# Patient Record
Sex: FEMALE | Race: WHITE | NOT HISPANIC OR LATINO | Employment: OTHER | URBAN - METROPOLITAN AREA
[De-identification: names, ages, dates, MRNs, and addresses within clinical notes are randomized per-mention and may not be internally consistent; named-entity substitution may affect disease eponyms.]

---

## 2017-01-05 ENCOUNTER — COMMUNICATION - HEALTHEAST (OUTPATIENT)
Dept: FAMILY MEDICINE | Facility: CLINIC | Age: 72
End: 2017-01-05

## 2017-01-05 ENCOUNTER — OFFICE VISIT - HEALTHEAST (OUTPATIENT)
Dept: FAMILY MEDICINE | Facility: CLINIC | Age: 72
End: 2017-01-05

## 2017-01-05 ENCOUNTER — RECORDS - HEALTHEAST (OUTPATIENT)
Dept: GENERAL RADIOLOGY | Facility: CLINIC | Age: 72
End: 2017-01-05

## 2017-01-05 DIAGNOSIS — R06.02 SHORTNESS OF BREATH: ICD-10-CM

## 2017-01-05 DIAGNOSIS — R05.9 COUGH: ICD-10-CM

## 2017-01-05 DIAGNOSIS — J02.0 STREP PHARYNGITIS: ICD-10-CM

## 2017-01-05 DIAGNOSIS — R53.1 WEAKNESS: ICD-10-CM

## 2017-01-05 DIAGNOSIS — E87.6 HYPOKALEMIA: ICD-10-CM

## 2017-01-05 DIAGNOSIS — K13.79 MOUTH SORES: ICD-10-CM

## 2017-01-10 ENCOUNTER — OFFICE VISIT - HEALTHEAST (OUTPATIENT)
Dept: FAMILY MEDICINE | Facility: CLINIC | Age: 72
End: 2017-01-10

## 2017-01-10 DIAGNOSIS — R05.9 COUGH: ICD-10-CM

## 2017-01-10 DIAGNOSIS — D64.9 ANEMIA: ICD-10-CM

## 2017-01-10 DIAGNOSIS — E87.6 HYPOKALEMIA: ICD-10-CM

## 2017-01-10 DIAGNOSIS — J18.9 POSTOBSTRUCTIVE PNEUMONIA: ICD-10-CM

## 2017-01-10 DIAGNOSIS — K12.0 ORAL APHTHOUS ULCER: ICD-10-CM

## 2017-01-10 DIAGNOSIS — R21 RASH: ICD-10-CM

## 2017-01-11 LAB
ERYTHROCYTE [DISTWIDTH] IN BLOOD BY AUTOMATED COUNT: 16.7 % (ref 11–14.5)
HCT VFR BLD AUTO: 35 % (ref 35–47)
HGB BLD-MCNC: 10 G/DL (ref 12–16)
LAB AP CHARGES (HE HISTORICAL CONVERSION): NORMAL
MCH RBC QN AUTO: 23.5 PG (ref 27–34)
MCHC RBC AUTO-ENTMCNC: 28.6 G/DL (ref 32–36)
MCV RBC AUTO: 82 FL (ref 80–100)
PATH REPORT.COMMENTS IMP SPEC: NORMAL
PATH REPORT.COMMENTS IMP SPEC: NORMAL
PATH REPORT.FINAL DX SPEC: NORMAL
PATH REPORT.MICROSCOPIC SPEC OTHER STN: ABNORMAL
PATH REPORT.MICROSCOPIC SPEC OTHER STN: NORMAL
PATH REPORT.RELEVANT HX SPEC: NORMAL
PLATELET # BLD AUTO: 684 THOU/UL (ref 140–440)
PMV BLD AUTO: 9.5 FL (ref 8.5–12.5)
RBC # BLD AUTO: 4.25 MILL/UL (ref 3.8–5.4)
WBC: 9.2 THOU/UL (ref 4–11)

## 2017-01-18 ENCOUNTER — COMMUNICATION - HEALTHEAST (OUTPATIENT)
Dept: PULMONOLOGY | Facility: OTHER | Age: 72
End: 2017-01-18

## 2017-01-24 ENCOUNTER — COMMUNICATION - HEALTHEAST (OUTPATIENT)
Dept: NURSING | Facility: CLINIC | Age: 72
End: 2017-01-24

## 2017-01-24 ENCOUNTER — COMMUNICATION - HEALTHEAST (OUTPATIENT)
Dept: FAMILY MEDICINE | Facility: CLINIC | Age: 72
End: 2017-01-24

## 2017-01-24 DIAGNOSIS — G50.9 TRIGEMINAL NERVE DISORDER: ICD-10-CM

## 2017-01-25 ENCOUNTER — HOSPITAL ENCOUNTER (OUTPATIENT)
Dept: CT IMAGING | Facility: HOSPITAL | Age: 72
Discharge: HOME | End: 2017-01-25
Attending: FAMILY MEDICINE

## 2017-01-25 ENCOUNTER — COMMUNICATION - HEALTHEAST (OUTPATIENT)
Dept: FAMILY MEDICINE | Facility: CLINIC | Age: 72
End: 2017-01-25

## 2017-01-25 DIAGNOSIS — F41.9 ANXIETY: ICD-10-CM

## 2017-01-25 DIAGNOSIS — R05.9 COUGH: ICD-10-CM

## 2017-02-01 ENCOUNTER — OFFICE VISIT - HEALTHEAST (OUTPATIENT)
Dept: PULMONOLOGY | Facility: OTHER | Age: 72
End: 2017-02-01

## 2017-02-01 DIAGNOSIS — J18.9 PNEUMONIA: ICD-10-CM

## 2017-02-22 ENCOUNTER — COMMUNICATION - HEALTHEAST (OUTPATIENT)
Dept: FAMILY MEDICINE | Facility: CLINIC | Age: 72
End: 2017-02-22

## 2017-02-22 DIAGNOSIS — G50.9 TRIGEMINAL NERVE DISORDER: ICD-10-CM

## 2017-03-01 ENCOUNTER — COMMUNICATION - HEALTHEAST (OUTPATIENT)
Dept: SCHEDULING | Facility: CLINIC | Age: 72
End: 2017-03-01

## 2017-03-02 ENCOUNTER — OFFICE VISIT - HEALTHEAST (OUTPATIENT)
Dept: FAMILY MEDICINE | Facility: CLINIC | Age: 72
End: 2017-03-02

## 2017-03-02 DIAGNOSIS — G50.0 TRIGEMINAL NEURALGIA: ICD-10-CM

## 2017-03-02 DIAGNOSIS — R51.9 FACIAL PAIN: ICD-10-CM

## 2017-03-06 ENCOUNTER — COMMUNICATION - HEALTHEAST (OUTPATIENT)
Dept: FAMILY MEDICINE | Facility: CLINIC | Age: 72
End: 2017-03-06

## 2017-03-06 ENCOUNTER — OFFICE VISIT - HEALTHEAST (OUTPATIENT)
Dept: FAMILY MEDICINE | Facility: CLINIC | Age: 72
End: 2017-03-06

## 2017-03-06 DIAGNOSIS — G50.0 TRIGEMINAL NEURALGIA: ICD-10-CM

## 2017-03-06 DIAGNOSIS — G43.909 MIGRAINE: ICD-10-CM

## 2017-03-06 DIAGNOSIS — R51.9 FACIAL PAIN: ICD-10-CM

## 2017-03-08 ENCOUNTER — COMMUNICATION - HEALTHEAST (OUTPATIENT)
Dept: FAMILY MEDICINE | Facility: CLINIC | Age: 72
End: 2017-03-08

## 2017-03-08 DIAGNOSIS — F41.9 ANXIETY: ICD-10-CM

## 2017-03-09 ENCOUNTER — AMBULATORY - HEALTHEAST (OUTPATIENT)
Dept: FAMILY MEDICINE | Facility: CLINIC | Age: 72
End: 2017-03-09

## 2017-03-09 DIAGNOSIS — G50.0 TRIGEMINAL NEURALGIA: ICD-10-CM

## 2017-03-09 DIAGNOSIS — R51.9 FACIAL PAIN: ICD-10-CM

## 2017-03-13 ENCOUNTER — AMBULATORY - HEALTHEAST (OUTPATIENT)
Dept: CARE COORDINATION | Facility: CLINIC | Age: 72
End: 2017-03-13

## 2017-03-14 ENCOUNTER — AMBULATORY - HEALTHEAST (OUTPATIENT)
Dept: CARE COORDINATION | Facility: CLINIC | Age: 72
End: 2017-03-14

## 2017-03-15 ENCOUNTER — AMBULATORY - HEALTHEAST (OUTPATIENT)
Dept: CARE COORDINATION | Facility: CLINIC | Age: 72
End: 2017-03-15

## 2017-03-16 ENCOUNTER — COMMUNICATION - HEALTHEAST (OUTPATIENT)
Dept: NURSING | Facility: CLINIC | Age: 72
End: 2017-03-16

## 2017-03-21 ENCOUNTER — COMMUNICATION - HEALTHEAST (OUTPATIENT)
Dept: FAMILY MEDICINE | Facility: CLINIC | Age: 72
End: 2017-03-21

## 2017-03-21 DIAGNOSIS — R51.9 FACIAL PAIN: ICD-10-CM

## 2017-03-28 ENCOUNTER — RECORDS - HEALTHEAST (OUTPATIENT)
Dept: ADMINISTRATIVE | Facility: OTHER | Age: 72
End: 2017-03-28

## 2017-03-31 ENCOUNTER — COMMUNICATION - HEALTHEAST (OUTPATIENT)
Dept: FAMILY MEDICINE | Facility: CLINIC | Age: 72
End: 2017-03-31

## 2017-03-31 DIAGNOSIS — G50.9 TRIGEMINAL NERVE DISORDER: ICD-10-CM

## 2017-03-31 DIAGNOSIS — F41.9 ANXIETY: ICD-10-CM

## 2017-04-18 ENCOUNTER — COMMUNICATION - HEALTHEAST (OUTPATIENT)
Dept: FAMILY MEDICINE | Facility: CLINIC | Age: 72
End: 2017-04-18

## 2017-04-18 DIAGNOSIS — F41.9 ANXIETY: ICD-10-CM

## 2017-04-18 DIAGNOSIS — G50.9 TRIGEMINAL NERVE DISORDER: ICD-10-CM

## 2017-05-04 ENCOUNTER — COMMUNICATION - HEALTHEAST (OUTPATIENT)
Dept: NURSING | Facility: CLINIC | Age: 72
End: 2017-05-04

## 2017-05-09 ENCOUNTER — COMMUNICATION - HEALTHEAST (OUTPATIENT)
Dept: FAMILY MEDICINE | Facility: CLINIC | Age: 72
End: 2017-05-09

## 2017-05-09 DIAGNOSIS — G43.909 MIGRAINE: ICD-10-CM

## 2017-05-10 ENCOUNTER — COMMUNICATION - HEALTHEAST (OUTPATIENT)
Dept: FAMILY MEDICINE | Facility: CLINIC | Age: 72
End: 2017-05-10

## 2017-05-10 DIAGNOSIS — G51.8 FACIAL NEURALGIA: ICD-10-CM

## 2017-05-15 ENCOUNTER — COMMUNICATION - HEALTHEAST (OUTPATIENT)
Dept: FAMILY MEDICINE | Facility: CLINIC | Age: 72
End: 2017-05-15

## 2017-05-15 DIAGNOSIS — G50.9 TRIGEMINAL NERVE DISORDER: ICD-10-CM

## 2017-05-23 ENCOUNTER — COMMUNICATION - HEALTHEAST (OUTPATIENT)
Dept: FAMILY MEDICINE | Facility: CLINIC | Age: 72
End: 2017-05-23

## 2017-05-23 DIAGNOSIS — G50.9 TRIGEMINAL NERVE DISORDER: ICD-10-CM

## 2017-05-25 ENCOUNTER — COMMUNICATION - HEALTHEAST (OUTPATIENT)
Dept: FAMILY MEDICINE | Facility: CLINIC | Age: 72
End: 2017-05-25

## 2017-05-25 DIAGNOSIS — F41.9 ANXIETY: ICD-10-CM

## 2017-06-01 ENCOUNTER — COMMUNICATION - HEALTHEAST (OUTPATIENT)
Dept: NURSING | Facility: CLINIC | Age: 72
End: 2017-06-01

## 2017-06-13 ENCOUNTER — COMMUNICATION - HEALTHEAST (OUTPATIENT)
Dept: NURSING | Facility: CLINIC | Age: 72
End: 2017-06-13

## 2017-06-20 ENCOUNTER — COMMUNICATION - HEALTHEAST (OUTPATIENT)
Dept: FAMILY MEDICINE | Facility: CLINIC | Age: 72
End: 2017-06-20

## 2017-06-20 DIAGNOSIS — G50.9 TRIGEMINAL NERVE DISORDER: ICD-10-CM

## 2017-06-23 ENCOUNTER — COMMUNICATION - HEALTHEAST (OUTPATIENT)
Dept: NURSING | Facility: CLINIC | Age: 72
End: 2017-06-23

## 2017-06-28 ENCOUNTER — COMMUNICATION - HEALTHEAST (OUTPATIENT)
Dept: FAMILY MEDICINE | Facility: CLINIC | Age: 72
End: 2017-06-28

## 2017-06-28 DIAGNOSIS — F41.9 ANXIETY: ICD-10-CM

## 2017-07-03 ENCOUNTER — COMMUNICATION - HEALTHEAST (OUTPATIENT)
Dept: FAMILY MEDICINE | Facility: CLINIC | Age: 72
End: 2017-07-03

## 2017-07-10 ENCOUNTER — AMBULATORY - HEALTHEAST (OUTPATIENT)
Dept: FAMILY MEDICINE | Facility: CLINIC | Age: 72
End: 2017-07-10

## 2017-07-10 ENCOUNTER — OFFICE VISIT - HEALTHEAST (OUTPATIENT)
Dept: FAMILY MEDICINE | Facility: CLINIC | Age: 72
End: 2017-07-10

## 2017-07-10 DIAGNOSIS — R60.9 EDEMA: ICD-10-CM

## 2017-07-10 DIAGNOSIS — B35.1 ONYCHOMYCOSIS: ICD-10-CM

## 2017-07-10 DIAGNOSIS — F41.9 ANXIETY: ICD-10-CM

## 2017-07-10 DIAGNOSIS — G50.0 TRIGEMINAL NEURALGIA: ICD-10-CM

## 2017-07-10 DIAGNOSIS — G50.9 TRIGEMINAL NERVE DISORDER: ICD-10-CM

## 2017-07-11 ENCOUNTER — COMMUNICATION - HEALTHEAST (OUTPATIENT)
Dept: FAMILY MEDICINE | Facility: CLINIC | Age: 72
End: 2017-07-11

## 2017-08-02 ENCOUNTER — COMMUNICATION - HEALTHEAST (OUTPATIENT)
Dept: FAMILY MEDICINE | Facility: CLINIC | Age: 72
End: 2017-08-02

## 2017-08-02 DIAGNOSIS — G43.909 MIGRAINE: ICD-10-CM

## 2017-08-10 ENCOUNTER — COMMUNICATION - HEALTHEAST (OUTPATIENT)
Dept: NURSING | Facility: CLINIC | Age: 72
End: 2017-08-10

## 2017-08-14 ENCOUNTER — COMMUNICATION - HEALTHEAST (OUTPATIENT)
Dept: FAMILY MEDICINE | Facility: CLINIC | Age: 72
End: 2017-08-14

## 2017-08-14 DIAGNOSIS — G50.9 TRIGEMINAL NERVE DISORDER: ICD-10-CM

## 2017-09-11 ENCOUNTER — COMMUNICATION - HEALTHEAST (OUTPATIENT)
Dept: FAMILY MEDICINE | Facility: CLINIC | Age: 72
End: 2017-09-11

## 2017-09-11 DIAGNOSIS — G50.9 TRIGEMINAL NERVE DISORDER: ICD-10-CM

## 2017-09-26 ENCOUNTER — COMMUNICATION - HEALTHEAST (OUTPATIENT)
Dept: NURSING | Facility: CLINIC | Age: 72
End: 2017-09-26

## 2017-10-03 ENCOUNTER — COMMUNICATION - HEALTHEAST (OUTPATIENT)
Dept: FAMILY MEDICINE | Facility: CLINIC | Age: 72
End: 2017-10-03

## 2017-10-03 DIAGNOSIS — G50.0 TRIGEMINAL NEURALGIA: ICD-10-CM

## 2017-10-09 ENCOUNTER — COMMUNICATION - HEALTHEAST (OUTPATIENT)
Dept: FAMILY MEDICINE | Facility: CLINIC | Age: 72
End: 2017-10-09

## 2017-10-09 DIAGNOSIS — G50.9 TRIGEMINAL NERVE DISORDER: ICD-10-CM

## 2017-11-03 ENCOUNTER — COMMUNICATION - HEALTHEAST (OUTPATIENT)
Dept: FAMILY MEDICINE | Facility: CLINIC | Age: 72
End: 2017-11-03

## 2017-11-03 DIAGNOSIS — G50.9 TRIGEMINAL NERVE DISORDER: ICD-10-CM

## 2017-12-01 ENCOUNTER — COMMUNICATION - HEALTHEAST (OUTPATIENT)
Dept: FAMILY MEDICINE | Facility: CLINIC | Age: 72
End: 2017-12-01

## 2017-12-01 DIAGNOSIS — G50.9 TRIGEMINAL NERVE DISORDER: ICD-10-CM

## 2017-12-26 ENCOUNTER — COMMUNICATION - HEALTHEAST (OUTPATIENT)
Dept: NURSING | Facility: CLINIC | Age: 72
End: 2017-12-26

## 2017-12-29 ENCOUNTER — COMMUNICATION - HEALTHEAST (OUTPATIENT)
Dept: NURSING | Facility: CLINIC | Age: 72
End: 2017-12-29

## 2018-01-02 ENCOUNTER — COMMUNICATION - HEALTHEAST (OUTPATIENT)
Dept: FAMILY MEDICINE | Facility: CLINIC | Age: 73
End: 2018-01-02

## 2018-01-02 DIAGNOSIS — G50.9 TRIGEMINAL NERVE DISORDER: ICD-10-CM

## 2018-01-03 ENCOUNTER — COMMUNICATION - HEALTHEAST (OUTPATIENT)
Dept: NURSING | Facility: CLINIC | Age: 73
End: 2018-01-03

## 2018-01-30 ENCOUNTER — COMMUNICATION - HEALTHEAST (OUTPATIENT)
Dept: FAMILY MEDICINE | Facility: CLINIC | Age: 73
End: 2018-01-30

## 2018-01-30 DIAGNOSIS — G50.9 TRIGEMINAL NERVE DISORDER: ICD-10-CM

## 2018-02-09 ENCOUNTER — COMMUNICATION - HEALTHEAST (OUTPATIENT)
Dept: NURSING | Facility: CLINIC | Age: 73
End: 2018-02-09

## 2018-02-15 ENCOUNTER — COMMUNICATION - HEALTHEAST (OUTPATIENT)
Dept: NURSING | Facility: CLINIC | Age: 73
End: 2018-02-15

## 2018-02-23 ENCOUNTER — COMMUNICATION - HEALTHEAST (OUTPATIENT)
Dept: NURSING | Facility: CLINIC | Age: 73
End: 2018-02-23

## 2018-02-26 ENCOUNTER — COMMUNICATION - HEALTHEAST (OUTPATIENT)
Dept: FAMILY MEDICINE | Facility: CLINIC | Age: 73
End: 2018-02-26

## 2018-02-26 DIAGNOSIS — G50.9 TRIGEMINAL NERVE DISORDER: ICD-10-CM

## 2018-03-02 ENCOUNTER — AMBULATORY - HEALTHEAST (OUTPATIENT)
Dept: NURSING | Facility: CLINIC | Age: 73
End: 2018-03-02

## 2018-03-19 ENCOUNTER — COMMUNICATION - HEALTHEAST (OUTPATIENT)
Dept: FAMILY MEDICINE | Facility: CLINIC | Age: 73
End: 2018-03-19

## 2018-03-19 DIAGNOSIS — G51.8 FACIAL NEURALGIA: ICD-10-CM

## 2018-03-26 ENCOUNTER — COMMUNICATION - HEALTHEAST (OUTPATIENT)
Dept: FAMILY MEDICINE | Facility: CLINIC | Age: 73
End: 2018-03-26

## 2018-03-26 DIAGNOSIS — G50.9 TRIGEMINAL NERVE DISORDER: ICD-10-CM

## 2018-04-23 ENCOUNTER — COMMUNICATION - HEALTHEAST (OUTPATIENT)
Dept: FAMILY MEDICINE | Facility: CLINIC | Age: 73
End: 2018-04-23

## 2018-04-23 DIAGNOSIS — G50.9 TRIGEMINAL NERVE DISORDER: ICD-10-CM

## 2018-04-25 ENCOUNTER — COMMUNICATION - HEALTHEAST (OUTPATIENT)
Dept: NURSING | Facility: CLINIC | Age: 73
End: 2018-04-25

## 2018-04-30 ENCOUNTER — COMMUNICATION - HEALTHEAST (OUTPATIENT)
Dept: NURSING | Facility: CLINIC | Age: 73
End: 2018-04-30

## 2018-05-08 ENCOUNTER — COMMUNICATION - HEALTHEAST (OUTPATIENT)
Dept: NURSING | Facility: CLINIC | Age: 73
End: 2018-05-08

## 2018-05-10 ENCOUNTER — COMMUNICATION - HEALTHEAST (OUTPATIENT)
Dept: NURSING | Facility: CLINIC | Age: 73
End: 2018-05-10

## 2018-05-10 ENCOUNTER — OFFICE VISIT - HEALTHEAST (OUTPATIENT)
Dept: FAMILY MEDICINE | Facility: CLINIC | Age: 73
End: 2018-05-10

## 2018-05-10 DIAGNOSIS — H61.23 HEARING LOSS DUE TO CERUMEN IMPACTION, BILATERAL: ICD-10-CM

## 2018-05-10 DIAGNOSIS — R60.9 EDEMA: ICD-10-CM

## 2018-05-10 DIAGNOSIS — Z12.11 SCREEN FOR COLON CANCER: ICD-10-CM

## 2018-05-10 DIAGNOSIS — Z12.31 VISIT FOR SCREENING MAMMOGRAM: ICD-10-CM

## 2018-05-10 DIAGNOSIS — Z00.00 ROUTINE GENERAL MEDICAL EXAMINATION AT A HEALTH CARE FACILITY: ICD-10-CM

## 2018-05-10 DIAGNOSIS — G50.0 TRIGEMINAL NEURALGIA: ICD-10-CM

## 2018-05-10 DIAGNOSIS — Z71.89 COUNSELING AND COORDINATION OF CARE: ICD-10-CM

## 2018-05-10 LAB
AMPHETAMINES UR QL SCN: ABNORMAL
ANION GAP SERPL CALCULATED.3IONS-SCNC: 7 MMOL/L (ref 5–18)
BARBITURATES UR QL: ABNORMAL
BENZODIAZ UR QL: ABNORMAL
BUN SERPL-MCNC: 12 MG/DL (ref 8–28)
CALCIUM SERPL-MCNC: 8.9 MG/DL (ref 8.5–10.5)
CANNABINOIDS UR QL SCN: ABNORMAL
CHLORIDE BLD-SCNC: 109 MMOL/L (ref 98–107)
CHOLEST SERPL-MCNC: 221 MG/DL
CO2 SERPL-SCNC: 27 MMOL/L (ref 22–31)
COCAINE UR QL: ABNORMAL
CREAT SERPL-MCNC: 0.72 MG/DL (ref 0.6–1.1)
CREAT UR-MCNC: 41.6 MG/DL
ERYTHROCYTE [DISTWIDTH] IN BLOOD BY AUTOMATED COUNT: 15.1 % (ref 11–14.5)
FASTING STATUS PATIENT QL REPORTED: YES
GFR SERPL CREATININE-BSD FRML MDRD: >60 ML/MIN/1.73M2
GLUCOSE BLD-MCNC: 91 MG/DL (ref 70–125)
HCT VFR BLD AUTO: 30.9 % (ref 35–47)
HDLC SERPL-MCNC: 75 MG/DL
HGB BLD-MCNC: 9.3 G/DL (ref 12–16)
LDLC SERPL CALC-MCNC: 125 MG/DL
MCH RBC QN AUTO: 21.6 PG (ref 27–34)
MCHC RBC AUTO-ENTMCNC: 30.2 G/DL (ref 32–36)
MCV RBC AUTO: 72 FL (ref 80–100)
METHADONE UR QL SCN: ABNORMAL
OPIATES UR QL SCN: ABNORMAL
OXYCODONE UR QL: ABNORMAL
PCP UR QL SCN: ABNORMAL
PLATELET # BLD AUTO: 434 THOU/UL (ref 140–440)
PMV BLD AUTO: 7.4 FL (ref 7–10)
POTASSIUM BLD-SCNC: 4 MMOL/L (ref 3.5–5)
RBC # BLD AUTO: 4.31 MILL/UL (ref 3.8–5.4)
SODIUM SERPL-SCNC: 143 MMOL/L (ref 136–145)
TRIGL SERPL-MCNC: 104 MG/DL
WBC: 7.8 THOU/UL (ref 4–11)

## 2018-05-10 ASSESSMENT — MIFFLIN-ST. JEOR: SCORE: 989.6

## 2018-05-11 ENCOUNTER — COMMUNICATION - HEALTHEAST (OUTPATIENT)
Dept: FAMILY MEDICINE | Facility: CLINIC | Age: 73
End: 2018-05-11

## 2018-05-22 ENCOUNTER — COMMUNICATION - HEALTHEAST (OUTPATIENT)
Dept: FAMILY MEDICINE | Facility: CLINIC | Age: 73
End: 2018-05-22

## 2018-05-22 DIAGNOSIS — G50.9 TRIGEMINAL NERVE DISORDER: ICD-10-CM

## 2018-06-11 ENCOUNTER — HOSPITAL ENCOUNTER (OUTPATIENT)
Dept: CARDIOLOGY | Facility: HOSPITAL | Age: 73
Discharge: HOME | End: 2018-06-11
Attending: FAMILY MEDICINE

## 2018-06-11 DIAGNOSIS — R60.9 EDEMA: ICD-10-CM

## 2018-06-11 LAB
AORTIC ROOT: 3 CM
AR DECEL SLOPE: 1990 MM/S2
AR PEAK VELOCITY: 428 CM/S
AV REGURGITANT PEAK GRADIENT: 73.3 MMHG
AV REGURGITATION PRESSURE HALF TIME: 598 MS
BSA FOR ECHO PROCEDURE: 1.5 M2
CV BLOOD PRESSURE: NORMAL MMHG
CV ECHO HEIGHT: 65.5 IN
CV ECHO WEIGHT: 108 LBS
DOP CALC LVOT AREA: 2.54 CM2
DOP CALC LVOT DIAMETER: 1.8 CM
DOP CALC LVOT PEAK VEL: 124 CM/S
DOP CALC LVOT STROKE VOLUME: 72.7 CM3
DOP CALCLVOT PEAK VEL VTI: 28.6 CM
ECHO EJECTION FRACTION ESTIMATED: 70 %
EJECTION FRACTION: 80 % (ref 55–75)
FRACTIONAL SHORTENING: 37.9 % (ref 28–44)
INTERVENTRICULAR SEPTUM IN END DIASTOLE: 0.87 CM (ref 0.6–0.9)
IVS/PW RATIO: 1
LEFT ATRIUM SIZE: 2.7 CM
LEFT ATRIUM TO AORTIC ROOT RATIO: 0.9 NO UNITS
LEFT VENTRICLE CARDIAC INDEX: 2.9 L/MIN/M2
LEFT VENTRICLE CARDIAC OUTPUT: 4.4 L/MIN
LEFT VENTRICLE DIASTOLIC VOLUME INDEX: 40.6 CM3/M2 (ref 34–74)
LEFT VENTRICLE DIASTOLIC VOLUME: 60.9 CM3 (ref 46–106)
LEFT VENTRICLE HEART RATE: 60 BPM
LEFT VENTRICLE MASS INDEX: 65.9 G/M2
LEFT VENTRICLE SYSTOLIC VOLUME INDEX: 7.9 CM3/M2 (ref 11–31)
LEFT VENTRICLE SYSTOLIC VOLUME: 11.9 CM3 (ref 14–42)
LEFT VENTRICULAR INTERNAL DIMENSION IN DIASTOLE: 3.83 CM (ref 3.8–5.2)
LEFT VENTRICULAR INTERNAL DIMENSION IN SYSTOLE: 2.38 CM (ref 2.2–3.5)
LEFT VENTRICULAR MASS: 98.8 G
LEFT VENTRICULAR OUTFLOW TRACT MEAN GRADIENT: 3 MMHG
LEFT VENTRICULAR OUTFLOW TRACT MEAN VELOCITY: 77.7 CM/S
LEFT VENTRICULAR OUTFLOW TRACT PEAK GRADIENT: 6 MMHG
LEFT VENTRICULAR POSTERIOR WALL IN END DIASTOLE: 0.88 CM (ref 0.6–0.9)
LV STROKE VOLUME INDEX: 48.5 ML/M2
MITRAL VALVE E/A RATIO: 0.9
MV AVERAGE E/E' RATIO: 15.1 CM/S
MV DECELERATION TIME: 197 MS
MV E'TISSUE VEL-LAT: 7.54 CM/S
MV E'TISSUE VEL-MED: 8.99 CM/S
MV LATERAL E/E' RATIO: 16.6
MV MEDIAL E/E' RATIO: 13.9
MV PEAK A VELOCITY: 144 CM/S
MV PEAK E VELOCITY: 125 CM/S
NUC REST DIASTOLIC VOLUME INDEX: 1728 LBS
NUC REST SYSTOLIC VOLUME INDEX: 65.5 IN
TRICUSPID REGURGITATION PEAK PRESSURE GRADIENT: 33.6 MMHG
TRICUSPID VALVE ANULAR PLANE SYSTOLIC EXCURSION: 2.3 CM
TRICUSPID VALVE PEAK REGURGITANT VELOCITY: 290 CM/S

## 2018-06-11 ASSESSMENT — MIFFLIN-ST. JEOR: SCORE: 988.69

## 2018-06-12 ENCOUNTER — HOSPITAL ENCOUNTER (OUTPATIENT)
Dept: MAMMOGRAPHY | Facility: CLINIC | Age: 73
Discharge: HOME | End: 2018-06-12
Attending: FAMILY MEDICINE

## 2018-06-12 ENCOUNTER — COMMUNICATION - HEALTHEAST (OUTPATIENT)
Dept: NURSING | Facility: CLINIC | Age: 73
End: 2018-06-12

## 2018-06-12 DIAGNOSIS — Z12.31 VISIT FOR SCREENING MAMMOGRAM: ICD-10-CM

## 2018-06-18 ENCOUNTER — COMMUNICATION - HEALTHEAST (OUTPATIENT)
Dept: FAMILY MEDICINE | Facility: CLINIC | Age: 73
End: 2018-06-18

## 2018-06-18 DIAGNOSIS — G50.9 TRIGEMINAL NERVE DISORDER: ICD-10-CM

## 2018-07-09 ENCOUNTER — OFFICE VISIT - HEALTHEAST (OUTPATIENT)
Dept: FAMILY MEDICINE | Facility: CLINIC | Age: 73
End: 2018-07-09

## 2018-07-09 DIAGNOSIS — M94.9 DISORDER OF BONE AND CARTILAGE: ICD-10-CM

## 2018-07-09 DIAGNOSIS — Z79.899 CONTROLLED SUBSTANCE AGREEMENT SIGNED: ICD-10-CM

## 2018-07-09 DIAGNOSIS — G50.9 TRIGEMINAL NERVE DISORDER: ICD-10-CM

## 2018-07-09 DIAGNOSIS — Z76.89 ENCOUNTER TO ESTABLISH CARE: ICD-10-CM

## 2018-07-09 DIAGNOSIS — G43.909 MIGRAINE: ICD-10-CM

## 2018-07-09 DIAGNOSIS — R63.6 UNDERWEIGHT: ICD-10-CM

## 2018-07-09 DIAGNOSIS — Z12.11 SCREEN FOR COLON CANCER: ICD-10-CM

## 2018-07-09 DIAGNOSIS — G50.0 TRIGEMINAL NEURALGIA: ICD-10-CM

## 2018-07-09 DIAGNOSIS — M89.9 DISORDER OF BONE AND CARTILAGE: ICD-10-CM

## 2018-07-09 DIAGNOSIS — G51.8 FACIAL NEURALGIA: ICD-10-CM

## 2018-07-09 DIAGNOSIS — G43.719 INTRACTABLE CHRONIC MIGRAINE WITHOUT AURA AND WITHOUT STATUS MIGRAINOSUS: ICD-10-CM

## 2018-07-09 ASSESSMENT — MIFFLIN-ST. JEOR: SCORE: 989.84

## 2018-07-17 ENCOUNTER — COMMUNICATION - HEALTHEAST (OUTPATIENT)
Dept: FAMILY MEDICINE | Facility: CLINIC | Age: 73
End: 2018-07-17

## 2018-07-17 DIAGNOSIS — G50.9 TRIGEMINAL NERVE DISORDER: ICD-10-CM

## 2018-07-23 ENCOUNTER — COMMUNICATION - HEALTHEAST (OUTPATIENT)
Dept: NURSING | Facility: CLINIC | Age: 73
End: 2018-07-23

## 2018-07-31 ENCOUNTER — COMMUNICATION - HEALTHEAST (OUTPATIENT)
Dept: NURSING | Facility: CLINIC | Age: 73
End: 2018-07-31

## 2018-08-08 ENCOUNTER — COMMUNICATION - HEALTHEAST (OUTPATIENT)
Dept: NURSING | Facility: CLINIC | Age: 73
End: 2018-08-08

## 2018-09-06 ENCOUNTER — COMMUNICATION - HEALTHEAST (OUTPATIENT)
Dept: FAMILY MEDICINE | Facility: CLINIC | Age: 73
End: 2018-09-06

## 2018-09-06 DIAGNOSIS — G43.909 MIGRAINE: ICD-10-CM

## 2018-09-06 DIAGNOSIS — G50.9 TRIGEMINAL NERVE DISORDER: ICD-10-CM

## 2018-09-10 ENCOUNTER — COMMUNICATION - HEALTHEAST (OUTPATIENT)
Dept: NURSING | Facility: CLINIC | Age: 73
End: 2018-09-10

## 2018-10-02 ENCOUNTER — COMMUNICATION - HEALTHEAST (OUTPATIENT)
Dept: FAMILY MEDICINE | Facility: CLINIC | Age: 73
End: 2018-10-02

## 2018-10-02 DIAGNOSIS — G50.9 TRIGEMINAL NERVE DISORDER: ICD-10-CM

## 2018-10-12 ENCOUNTER — COMMUNICATION - HEALTHEAST (OUTPATIENT)
Dept: FAMILY MEDICINE | Facility: CLINIC | Age: 73
End: 2018-10-12

## 2018-10-15 ENCOUNTER — COMMUNICATION - HEALTHEAST (OUTPATIENT)
Dept: NURSING | Facility: CLINIC | Age: 73
End: 2018-10-15

## 2018-10-15 ENCOUNTER — COMMUNICATION - HEALTHEAST (OUTPATIENT)
Dept: FAMILY MEDICINE | Facility: CLINIC | Age: 73
End: 2018-10-15

## 2018-10-15 ENCOUNTER — OFFICE VISIT - HEALTHEAST (OUTPATIENT)
Dept: FAMILY MEDICINE | Facility: CLINIC | Age: 73
End: 2018-10-15

## 2018-10-15 DIAGNOSIS — J44.9 CHRONIC OBSTRUCTIVE PULMONARY DISEASE, UNSPECIFIED COPD TYPE (H): ICD-10-CM

## 2018-10-15 DIAGNOSIS — F32.1 MODERATE MAJOR DEPRESSION (H): ICD-10-CM

## 2018-10-15 DIAGNOSIS — G43.909 MIGRAINE: ICD-10-CM

## 2018-10-15 DIAGNOSIS — M94.9 DISORDER OF BONE AND CARTILAGE: ICD-10-CM

## 2018-10-15 DIAGNOSIS — G50.0 TRIGEMINAL NEURALGIA: ICD-10-CM

## 2018-10-15 DIAGNOSIS — Z12.11 SCREEN FOR COLON CANCER: ICD-10-CM

## 2018-10-15 DIAGNOSIS — G50.9 TRIGEMINAL NERVE DISORDER: ICD-10-CM

## 2018-10-15 DIAGNOSIS — G43.719 INTRACTABLE CHRONIC MIGRAINE WITHOUT AURA AND WITHOUT STATUS MIGRAINOSUS: ICD-10-CM

## 2018-10-15 DIAGNOSIS — M89.9 DISORDER OF BONE AND CARTILAGE: ICD-10-CM

## 2018-10-15 DIAGNOSIS — D64.9 ANEMIA: ICD-10-CM

## 2018-10-15 DIAGNOSIS — Z79.899 CONTROLLED SUBSTANCE AGREEMENT SIGNED: ICD-10-CM

## 2018-10-15 DIAGNOSIS — E43 PROTEIN-CALORIE MALNUTRITION, SEVERE (H): ICD-10-CM

## 2018-10-18 ENCOUNTER — AMBULATORY - HEALTHEAST (OUTPATIENT)
Dept: CARE COORDINATION | Facility: CLINIC | Age: 73
End: 2018-10-18

## 2018-10-19 ENCOUNTER — COMMUNICATION - HEALTHEAST (OUTPATIENT)
Dept: FAMILY MEDICINE | Facility: CLINIC | Age: 73
End: 2018-10-19

## 2018-10-19 DIAGNOSIS — G51.8 FACIAL NEURALGIA: ICD-10-CM

## 2018-10-26 ENCOUNTER — COMMUNICATION - HEALTHEAST (OUTPATIENT)
Dept: NURSING | Facility: CLINIC | Age: 73
End: 2018-10-26

## 2018-10-29 ENCOUNTER — COMMUNICATION - HEALTHEAST (OUTPATIENT)
Dept: FAMILY MEDICINE | Facility: CLINIC | Age: 73
End: 2018-10-29

## 2018-10-29 DIAGNOSIS — G50.9 TRIGEMINAL NERVE DISORDER: ICD-10-CM

## 2018-11-01 ENCOUNTER — COMMUNICATION - HEALTHEAST (OUTPATIENT)
Dept: NURSING | Facility: CLINIC | Age: 73
End: 2018-11-01

## 2018-11-08 ENCOUNTER — COMMUNICATION - HEALTHEAST (OUTPATIENT)
Dept: CARE COORDINATION | Facility: CLINIC | Age: 73
End: 2018-11-08

## 2018-11-08 ENCOUNTER — AMBULATORY - HEALTHEAST (OUTPATIENT)
Dept: NURSING | Facility: CLINIC | Age: 73
End: 2018-11-08

## 2018-11-09 ENCOUNTER — COMMUNICATION - HEALTHEAST (OUTPATIENT)
Dept: FAMILY MEDICINE | Facility: CLINIC | Age: 73
End: 2018-11-09

## 2018-11-09 DIAGNOSIS — F32.A DEPRESSION: ICD-10-CM

## 2018-11-26 ENCOUNTER — COMMUNICATION - HEALTHEAST (OUTPATIENT)
Dept: FAMILY MEDICINE | Facility: CLINIC | Age: 73
End: 2018-11-26

## 2018-11-26 ENCOUNTER — AMBULATORY - HEALTHEAST (OUTPATIENT)
Dept: NURSING | Facility: CLINIC | Age: 73
End: 2018-11-26

## 2018-11-26 ENCOUNTER — COMMUNICATION - HEALTHEAST (OUTPATIENT)
Dept: NURSING | Facility: CLINIC | Age: 73
End: 2018-11-26

## 2018-11-26 DIAGNOSIS — G50.9 TRIGEMINAL NERVE DISORDER: ICD-10-CM

## 2018-11-28 ENCOUNTER — COMMUNICATION - HEALTHEAST (OUTPATIENT)
Dept: FAMILY MEDICINE | Facility: CLINIC | Age: 73
End: 2018-11-28

## 2018-11-28 DIAGNOSIS — G50.9 TRIGEMINAL NERVE DISORDER: ICD-10-CM

## 2018-12-03 ENCOUNTER — COMMUNICATION - HEALTHEAST (OUTPATIENT)
Dept: NURSING | Facility: CLINIC | Age: 73
End: 2018-12-03

## 2018-12-04 ENCOUNTER — COMMUNICATION - HEALTHEAST (OUTPATIENT)
Dept: NURSING | Facility: CLINIC | Age: 73
End: 2018-12-04

## 2018-12-04 ENCOUNTER — COMMUNICATION - HEALTHEAST (OUTPATIENT)
Dept: FAMILY MEDICINE | Facility: CLINIC | Age: 73
End: 2018-12-04

## 2018-12-04 ENCOUNTER — OFFICE VISIT - HEALTHEAST (OUTPATIENT)
Dept: FAMILY MEDICINE | Facility: CLINIC | Age: 73
End: 2018-12-04

## 2018-12-04 DIAGNOSIS — Z71.89 COUNSELING AND COORDINATION OF CARE: ICD-10-CM

## 2018-12-04 DIAGNOSIS — F33.0 MILD EPISODE OF RECURRENT MAJOR DEPRESSIVE DISORDER (H): ICD-10-CM

## 2018-12-04 DIAGNOSIS — Z79.899 CONTROLLED SUBSTANCE AGREEMENT SIGNED: ICD-10-CM

## 2018-12-04 DIAGNOSIS — G50.0 TRIGEMINAL NEURALGIA: ICD-10-CM

## 2018-12-04 DIAGNOSIS — G43.019 INTRACTABLE MIGRAINE WITHOUT AURA AND WITHOUT STATUS MIGRAINOSUS: ICD-10-CM

## 2018-12-21 ENCOUNTER — COMMUNICATION - HEALTHEAST (OUTPATIENT)
Dept: FAMILY MEDICINE | Facility: CLINIC | Age: 73
End: 2018-12-21

## 2018-12-21 DIAGNOSIS — G43.719 INTRACTABLE CHRONIC MIGRAINE WITHOUT AURA AND WITHOUT STATUS MIGRAINOSUS: ICD-10-CM

## 2018-12-21 DIAGNOSIS — G50.9 TRIGEMINAL NERVE DISORDER: ICD-10-CM

## 2018-12-21 DIAGNOSIS — G43.909 MIGRAINE: ICD-10-CM

## 2018-12-26 ENCOUNTER — COMMUNICATION - HEALTHEAST (OUTPATIENT)
Dept: FAMILY MEDICINE | Facility: CLINIC | Age: 73
End: 2018-12-26

## 2019-01-09 ENCOUNTER — COMMUNICATION - HEALTHEAST (OUTPATIENT)
Dept: NURSING | Facility: CLINIC | Age: 74
End: 2019-01-09

## 2019-01-29 ENCOUNTER — COMMUNICATION - HEALTHEAST (OUTPATIENT)
Dept: FAMILY MEDICINE | Facility: CLINIC | Age: 74
End: 2019-01-29

## 2019-01-29 DIAGNOSIS — G50.9 TRIGEMINAL NERVE DISORDER: ICD-10-CM

## 2019-02-06 ENCOUNTER — COMMUNICATION - HEALTHEAST (OUTPATIENT)
Dept: SCHEDULING | Facility: CLINIC | Age: 74
End: 2019-02-06

## 2019-02-11 ENCOUNTER — RECORDS - HEALTHEAST (OUTPATIENT)
Dept: LAB | Facility: CLINIC | Age: 74
End: 2019-02-11

## 2019-02-11 ENCOUNTER — COMMUNICATION - HEALTHEAST (OUTPATIENT)
Dept: NURSING | Facility: CLINIC | Age: 74
End: 2019-02-11

## 2019-02-11 ENCOUNTER — COMMUNICATION - HEALTHEAST (OUTPATIENT)
Dept: GERIATRICS | Facility: CLINIC | Age: 74
End: 2019-02-11

## 2019-02-11 LAB
ANION GAP SERPL CALCULATED.3IONS-SCNC: 7 MMOL/L (ref 5–18)
BUN SERPL-MCNC: 9 MG/DL (ref 8–28)
CALCIUM SERPL-MCNC: 9.9 MG/DL (ref 8.5–10.5)
CHLORIDE BLD-SCNC: 105 MMOL/L (ref 98–107)
CO2 SERPL-SCNC: 28 MMOL/L (ref 22–31)
CREAT SERPL-MCNC: 0.67 MG/DL (ref 0.6–1.1)
GFR SERPL CREATININE-BSD FRML MDRD: >60 ML/MIN/1.73M2
GLUCOSE BLD-MCNC: 98 MG/DL (ref 70–125)
MAGNESIUM SERPL-MCNC: 2.2 MG/DL (ref 1.8–2.6)
POTASSIUM BLD-SCNC: 3.8 MMOL/L (ref 3.5–5)
SODIUM SERPL-SCNC: 140 MMOL/L (ref 136–145)

## 2019-02-12 ENCOUNTER — OFFICE VISIT - HEALTHEAST (OUTPATIENT)
Dept: GERIATRICS | Facility: CLINIC | Age: 74
End: 2019-02-12

## 2019-02-12 DIAGNOSIS — G43.019 INTRACTABLE MIGRAINE WITHOUT AURA AND WITHOUT STATUS MIGRAINOSUS: ICD-10-CM

## 2019-02-12 DIAGNOSIS — D64.9 ANEMIA, UNSPECIFIED TYPE: ICD-10-CM

## 2019-02-12 DIAGNOSIS — E83.52 HYPERCALCEMIA: ICD-10-CM

## 2019-02-12 DIAGNOSIS — R63.4 WEIGHT LOSS: ICD-10-CM

## 2019-02-12 DIAGNOSIS — G93.40 ENCEPHALOPATHY: ICD-10-CM

## 2019-02-12 DIAGNOSIS — K59.09 OTHER CONSTIPATION: ICD-10-CM

## 2019-02-12 DIAGNOSIS — G50.0 TRIGEMINAL NEURALGIA: ICD-10-CM

## 2019-02-13 ENCOUNTER — RECORDS - HEALTHEAST (OUTPATIENT)
Dept: LAB | Facility: CLINIC | Age: 74
End: 2019-02-13

## 2019-02-13 LAB
BASOPHILS # BLD AUTO: 0.1 THOU/UL (ref 0–0.2)
BASOPHILS NFR BLD AUTO: 1 % (ref 0–2)
CALCIUM SERPL-MCNC: 9.4 MG/DL (ref 8.5–10.5)
EOSINOPHIL # BLD AUTO: 0.2 THOU/UL (ref 0–0.4)
EOSINOPHIL NFR BLD AUTO: 3 % (ref 0–6)
ERYTHROCYTE [DISTWIDTH] IN BLOOD BY AUTOMATED COUNT: 19 % (ref 11–14.5)
HCT VFR BLD AUTO: 36 % (ref 35–47)
HGB BLD-MCNC: 9.9 G/DL (ref 12–16)
LYMPHOCYTES # BLD AUTO: 2.8 THOU/UL (ref 0.8–4.4)
LYMPHOCYTES NFR BLD AUTO: 42 % (ref 20–40)
MCH RBC QN AUTO: 21.5 PG (ref 27–34)
MCHC RBC AUTO-ENTMCNC: 27.5 G/DL (ref 32–36)
MCV RBC AUTO: 78 FL (ref 80–100)
MONOCYTES # BLD AUTO: 0.5 THOU/UL (ref 0–0.9)
MONOCYTES NFR BLD AUTO: 7 % (ref 2–10)
NEUTROPHILS # BLD AUTO: 3.1 THOU/UL (ref 2–7.7)
NEUTROPHILS NFR BLD AUTO: 47 % (ref 50–70)
PLATELET # BLD AUTO: 595 THOU/UL (ref 140–440)
PMV BLD AUTO: 11 FL (ref 8.5–12.5)
RBC # BLD AUTO: 4.61 MILL/UL (ref 3.8–5.4)
WBC: 6.6 THOU/UL (ref 4–11)

## 2019-02-15 ENCOUNTER — OFFICE VISIT - HEALTHEAST (OUTPATIENT)
Dept: GERIATRICS | Facility: CLINIC | Age: 74
End: 2019-02-15

## 2019-02-15 DIAGNOSIS — G50.0 TRIGEMINAL NEURALGIA: ICD-10-CM

## 2019-02-15 DIAGNOSIS — E83.52 HYPERCALCEMIA: ICD-10-CM

## 2019-02-15 DIAGNOSIS — K29.60 REFLUX GASTRITIS: ICD-10-CM

## 2019-02-15 DIAGNOSIS — R10.13 EPIGASTRIC PAIN: ICD-10-CM

## 2019-02-17 ENCOUNTER — OFFICE VISIT - HEALTHEAST (OUTPATIENT)
Dept: GERIATRICS | Facility: CLINIC | Age: 74
End: 2019-02-17

## 2019-02-17 DIAGNOSIS — R63.4 WEIGHT LOSS: ICD-10-CM

## 2019-02-17 DIAGNOSIS — R10.13 EPIGASTRIC PAIN: ICD-10-CM

## 2019-02-17 DIAGNOSIS — E44.0 MODERATE PROTEIN-CALORIE MALNUTRITION (H): ICD-10-CM

## 2019-02-17 DIAGNOSIS — K29.60 REFLUX GASTRITIS: ICD-10-CM

## 2019-02-17 DIAGNOSIS — K59.09 OTHER CONSTIPATION: ICD-10-CM

## 2019-02-17 DIAGNOSIS — E83.52 HYPERCALCEMIA: ICD-10-CM

## 2019-02-17 DIAGNOSIS — G50.0 TRIGEMINAL NEURALGIA: ICD-10-CM

## 2019-02-17 DIAGNOSIS — D64.9 ANEMIA, UNSPECIFIED TYPE: ICD-10-CM

## 2019-02-17 DIAGNOSIS — G93.40 ENCEPHALOPATHY: ICD-10-CM

## 2019-02-17 ASSESSMENT — MIFFLIN-ST. JEOR: SCORE: 924.06

## 2019-02-18 ENCOUNTER — RECORDS - HEALTHEAST (OUTPATIENT)
Dept: LAB | Facility: CLINIC | Age: 74
End: 2019-02-18

## 2019-02-18 ENCOUNTER — COMMUNICATION - HEALTHEAST (OUTPATIENT)
Dept: GERIATRICS | Facility: CLINIC | Age: 74
End: 2019-02-18

## 2019-02-18 LAB
ALBUMIN SERPL-MCNC: 2.7 G/DL (ref 3.5–5)
FERRITIN SERPL-MCNC: 6 NG/ML (ref 10–130)
PREALB SERPL-MCNC: 17.7 MG/DL (ref 19–38)

## 2019-02-19 ENCOUNTER — RECORDS - HEALTHEAST (OUTPATIENT)
Dept: LAB | Facility: CLINIC | Age: 74
End: 2019-02-19

## 2019-02-19 ENCOUNTER — OFFICE VISIT - HEALTHEAST (OUTPATIENT)
Dept: GERIATRICS | Facility: CLINIC | Age: 74
End: 2019-02-19

## 2019-02-19 DIAGNOSIS — K59.09 OTHER CONSTIPATION: ICD-10-CM

## 2019-02-19 DIAGNOSIS — D64.9 ANEMIA, UNSPECIFIED TYPE: ICD-10-CM

## 2019-02-19 DIAGNOSIS — E83.52 HYPERCALCEMIA: ICD-10-CM

## 2019-02-19 DIAGNOSIS — E44.0 MODERATE PROTEIN-CALORIE MALNUTRITION (H): ICD-10-CM

## 2019-02-19 DIAGNOSIS — H10.33 ACUTE CONJUNCTIVITIS OF BOTH EYES, UNSPECIFIED ACUTE CONJUNCTIVITIS TYPE: ICD-10-CM

## 2019-02-19 DIAGNOSIS — K29.60 REFLUX GASTRITIS: ICD-10-CM

## 2019-02-19 DIAGNOSIS — G50.0 TRIGEMINAL NEURALGIA: ICD-10-CM

## 2019-02-19 LAB
BASOPHILS # BLD AUTO: 0.1 THOU/UL (ref 0–0.2)
BASOPHILS NFR BLD AUTO: 1 % (ref 0–2)
EOSINOPHIL # BLD AUTO: 0.2 THOU/UL (ref 0–0.4)
EOSINOPHIL NFR BLD AUTO: 3 % (ref 0–6)
ERYTHROCYTE [DISTWIDTH] IN BLOOD BY AUTOMATED COUNT: 19.2 % (ref 11–14.5)
HCT VFR BLD AUTO: 28.3 % (ref 35–47)
HGB BLD-MCNC: 7.9 G/DL (ref 12–16)
LYMPHOCYTES # BLD AUTO: 3.2 THOU/UL (ref 0.8–4.4)
LYMPHOCYTES NFR BLD AUTO: 50 % (ref 20–40)
MCH RBC QN AUTO: 21.5 PG (ref 27–34)
MCHC RBC AUTO-ENTMCNC: 27.9 G/DL (ref 32–36)
MCV RBC AUTO: 77 FL (ref 80–100)
MONOCYTES # BLD AUTO: 0.5 THOU/UL (ref 0–0.9)
MONOCYTES NFR BLD AUTO: 8 % (ref 2–10)
NEUTROPHILS # BLD AUTO: 2.5 THOU/UL (ref 2–7.7)
NEUTROPHILS NFR BLD AUTO: 39 % (ref 50–70)
PLATELET # BLD AUTO: 585 THOU/UL (ref 140–440)
PMV BLD AUTO: 9.4 FL (ref 8.5–12.5)
RBC # BLD AUTO: 3.67 MILL/UL (ref 3.8–5.4)
WBC: 6.4 THOU/UL (ref 4–11)

## 2019-02-20 ENCOUNTER — RECORDS - HEALTHEAST (OUTPATIENT)
Dept: LAB | Facility: CLINIC | Age: 74
End: 2019-02-20

## 2019-02-20 LAB — HGB BLD-MCNC: 7.9 G/DL (ref 12–16)

## 2019-02-21 ENCOUNTER — RECORDS - HEALTHEAST (OUTPATIENT)
Dept: LAB | Facility: CLINIC | Age: 74
End: 2019-02-21

## 2019-02-22 LAB
H PYLORI AG STL QL IA: NORMAL
REPORT STATUS: NORMAL
SPECIMEN DESCRIPTION: NORMAL

## 2019-02-25 ENCOUNTER — OFFICE VISIT - HEALTHEAST (OUTPATIENT)
Dept: GERIATRICS | Facility: CLINIC | Age: 74
End: 2019-02-25

## 2019-02-25 DIAGNOSIS — R10.13 EPIGASTRIC PAIN: ICD-10-CM

## 2019-02-25 DIAGNOSIS — D64.9 ANEMIA, UNSPECIFIED TYPE: ICD-10-CM

## 2019-02-25 DIAGNOSIS — K29.60 REFLUX GASTRITIS: ICD-10-CM

## 2019-02-25 DIAGNOSIS — R63.4 WEIGHT LOSS: ICD-10-CM

## 2019-02-25 DIAGNOSIS — G50.0 TRIGEMINAL NEURALGIA: ICD-10-CM

## 2019-02-25 DIAGNOSIS — H10.33 ACUTE CONJUNCTIVITIS OF BOTH EYES, UNSPECIFIED ACUTE CONJUNCTIVITIS TYPE: ICD-10-CM

## 2019-02-25 DIAGNOSIS — E44.0 MODERATE PROTEIN-CALORIE MALNUTRITION (H): ICD-10-CM

## 2019-02-27 ENCOUNTER — RECORDS - HEALTHEAST (OUTPATIENT)
Dept: LAB | Facility: CLINIC | Age: 74
End: 2019-02-27

## 2019-02-27 ENCOUNTER — OFFICE VISIT - HEALTHEAST (OUTPATIENT)
Dept: GERIATRICS | Facility: CLINIC | Age: 74
End: 2019-02-27

## 2019-02-27 DIAGNOSIS — R10.13 EPIGASTRIC PAIN: ICD-10-CM

## 2019-02-27 DIAGNOSIS — G50.0 TRIGEMINAL NEURALGIA: ICD-10-CM

## 2019-02-27 DIAGNOSIS — G43.019 INTRACTABLE MIGRAINE WITHOUT AURA AND WITHOUT STATUS MIGRAINOSUS: ICD-10-CM

## 2019-02-27 DIAGNOSIS — K29.60 REFLUX GASTRITIS: ICD-10-CM

## 2019-02-27 DIAGNOSIS — E83.52 HYPERCALCEMIA: ICD-10-CM

## 2019-02-27 DIAGNOSIS — H04.123 DRY EYES: ICD-10-CM

## 2019-02-27 DIAGNOSIS — E44.0 MODERATE PROTEIN-CALORIE MALNUTRITION (H): ICD-10-CM

## 2019-02-27 LAB
BASOPHILS # BLD AUTO: 0.1 THOU/UL (ref 0–0.2)
BASOPHILS NFR BLD AUTO: 1 % (ref 0–2)
EOSINOPHIL # BLD AUTO: 0.2 THOU/UL (ref 0–0.4)
EOSINOPHIL NFR BLD AUTO: 4 % (ref 0–6)
ERYTHROCYTE [DISTWIDTH] IN BLOOD BY AUTOMATED COUNT: 22.8 % (ref 11–14.5)
HCT VFR BLD AUTO: 30.6 % (ref 35–47)
HGB BLD-MCNC: 8.7 G/DL (ref 12–16)
LYMPHOCYTES # BLD AUTO: 3 THOU/UL (ref 0.8–4.4)
LYMPHOCYTES NFR BLD AUTO: 50 % (ref 20–40)
MCH RBC QN AUTO: 22.8 PG (ref 27–34)
MCHC RBC AUTO-ENTMCNC: 28.4 G/DL (ref 32–36)
MCV RBC AUTO: 80 FL (ref 80–100)
MONOCYTES # BLD AUTO: 0.5 THOU/UL (ref 0–0.9)
MONOCYTES NFR BLD AUTO: 8 % (ref 2–10)
NEUTROPHILS # BLD AUTO: 2.3 THOU/UL (ref 2–7.7)
NEUTROPHILS NFR BLD AUTO: 38 % (ref 50–70)
OVALOCYTES: ABNORMAL
PLAT MORPH BLD: NORMAL
PLATELET # BLD AUTO: 412 THOU/UL (ref 140–440)
PMV BLD AUTO: 9.4 FL (ref 8.5–12.5)
POLYCHROMASIA BLD QL SMEAR: ABNORMAL
RBC # BLD AUTO: 3.81 MILL/UL (ref 3.8–5.4)
WBC: 6 THOU/UL (ref 4–11)

## 2019-03-01 ENCOUNTER — COMMUNICATION - HEALTHEAST (OUTPATIENT)
Dept: GERIATRICS | Facility: CLINIC | Age: 74
End: 2019-03-01

## 2019-03-01 ENCOUNTER — AMBULATORY - HEALTHEAST (OUTPATIENT)
Dept: GERIATRICS | Facility: CLINIC | Age: 74
End: 2019-03-01

## 2019-03-05 ENCOUNTER — COMMUNICATION - HEALTHEAST (OUTPATIENT)
Dept: NURSING | Facility: CLINIC | Age: 74
End: 2019-03-05

## 2019-03-05 ENCOUNTER — OFFICE VISIT - HEALTHEAST (OUTPATIENT)
Dept: FAMILY MEDICINE | Facility: CLINIC | Age: 74
End: 2019-03-05

## 2019-03-05 ENCOUNTER — COMMUNICATION - HEALTHEAST (OUTPATIENT)
Dept: FAMILY MEDICINE | Facility: CLINIC | Age: 74
End: 2019-03-05

## 2019-03-05 DIAGNOSIS — E43 PROTEIN-CALORIE MALNUTRITION, SEVERE (H): ICD-10-CM

## 2019-03-05 DIAGNOSIS — Z09 HOSPITAL DISCHARGE FOLLOW-UP: ICD-10-CM

## 2019-03-05 DIAGNOSIS — Z79.899 CONTROLLED SUBSTANCE AGREEMENT SIGNED: ICD-10-CM

## 2019-03-05 DIAGNOSIS — K29.50 OTHER CHRONIC GASTRITIS WITHOUT HEMORRHAGE: ICD-10-CM

## 2019-03-05 DIAGNOSIS — E83.52 HYPERCALCEMIA: ICD-10-CM

## 2019-03-05 DIAGNOSIS — F33.1 MODERATE EPISODE OF RECURRENT MAJOR DEPRESSIVE DISORDER (H): ICD-10-CM

## 2019-03-05 DIAGNOSIS — F33.0 MILD EPISODE OF RECURRENT MAJOR DEPRESSIVE DISORDER (H): ICD-10-CM

## 2019-03-05 DIAGNOSIS — K08.89 LOOSE, TEETH: ICD-10-CM

## 2019-03-05 DIAGNOSIS — G43.019 INTRACTABLE MIGRAINE WITHOUT AURA AND WITHOUT STATUS MIGRAINOSUS: ICD-10-CM

## 2019-03-05 DIAGNOSIS — G50.0 TRIGEMINAL NEURALGIA: ICD-10-CM

## 2019-03-05 DIAGNOSIS — D50.8 IRON DEFICIENCY ANEMIA SECONDARY TO INADEQUATE DIETARY IRON INTAKE: ICD-10-CM

## 2019-03-05 LAB
CALCIUM SERPL-MCNC: 9.8 MG/DL (ref 8.5–10.5)
HGB BLD-MCNC: 9.5 G/DL (ref 12–16)
PREALB SERPL-MCNC: 29.5 MG/DL (ref 19–38)

## 2019-03-14 ENCOUNTER — COMMUNICATION - HEALTHEAST (OUTPATIENT)
Dept: NURSING | Facility: CLINIC | Age: 74
End: 2019-03-14

## 2019-03-21 ENCOUNTER — COMMUNICATION - HEALTHEAST (OUTPATIENT)
Dept: FAMILY MEDICINE | Facility: CLINIC | Age: 74
End: 2019-03-21

## 2019-03-21 DIAGNOSIS — G50.9 TRIGEMINAL NERVE DISORDER: ICD-10-CM

## 2019-04-22 ENCOUNTER — COMMUNICATION - HEALTHEAST (OUTPATIENT)
Dept: FAMILY MEDICINE | Facility: CLINIC | Age: 74
End: 2019-04-22

## 2019-04-22 DIAGNOSIS — G50.9 TRIGEMINAL NERVE DISORDER: ICD-10-CM

## 2019-04-25 ENCOUNTER — COMMUNICATION - HEALTHEAST (OUTPATIENT)
Dept: FAMILY MEDICINE | Facility: CLINIC | Age: 74
End: 2019-04-25

## 2019-05-03 ENCOUNTER — COMMUNICATION - HEALTHEAST (OUTPATIENT)
Dept: NURSING | Facility: CLINIC | Age: 74
End: 2019-05-03

## 2019-05-09 ENCOUNTER — OFFICE VISIT - HEALTHEAST (OUTPATIENT)
Dept: FAMILY MEDICINE | Facility: CLINIC | Age: 74
End: 2019-05-09

## 2019-05-09 DIAGNOSIS — F33.1 MODERATE EPISODE OF RECURRENT MAJOR DEPRESSIVE DISORDER (H): ICD-10-CM

## 2019-05-09 DIAGNOSIS — E43 PROTEIN-CALORIE MALNUTRITION, SEVERE (H): ICD-10-CM

## 2019-05-09 DIAGNOSIS — Q84.5 ENLARGED AND HYPERTROPHIC NAILS: ICD-10-CM

## 2019-05-09 DIAGNOSIS — G50.9 TRIGEMINAL NERVE DISORDER: ICD-10-CM

## 2019-06-13 ENCOUNTER — COMMUNICATION - HEALTHEAST (OUTPATIENT)
Dept: FAMILY MEDICINE | Facility: CLINIC | Age: 74
End: 2019-06-13

## 2019-06-13 DIAGNOSIS — G50.9 TRIGEMINAL NERVE DISORDER: ICD-10-CM

## 2019-07-01 ENCOUNTER — COMMUNICATION - HEALTHEAST (OUTPATIENT)
Dept: NURSING | Facility: CLINIC | Age: 74
End: 2019-07-01

## 2019-07-10 ENCOUNTER — COMMUNICATION - HEALTHEAST (OUTPATIENT)
Dept: NURSING | Facility: CLINIC | Age: 74
End: 2019-07-10

## 2019-07-11 ENCOUNTER — COMMUNICATION - HEALTHEAST (OUTPATIENT)
Dept: NURSING | Facility: CLINIC | Age: 74
End: 2019-07-11

## 2019-07-15 ENCOUNTER — COMMUNICATION - HEALTHEAST (OUTPATIENT)
Dept: FAMILY MEDICINE | Facility: CLINIC | Age: 74
End: 2019-07-15

## 2019-07-15 DIAGNOSIS — G50.9 TRIGEMINAL NERVE DISORDER: ICD-10-CM

## 2019-07-18 ENCOUNTER — COMMUNICATION - HEALTHEAST (OUTPATIENT)
Dept: NURSING | Facility: CLINIC | Age: 74
End: 2019-07-18

## 2019-08-12 ENCOUNTER — COMMUNICATION - HEALTHEAST (OUTPATIENT)
Dept: FAMILY MEDICINE | Facility: CLINIC | Age: 74
End: 2019-08-12

## 2019-08-12 DIAGNOSIS — G50.9 TRIGEMINAL NERVE DISORDER: ICD-10-CM

## 2019-08-20 ENCOUNTER — COMMUNICATION - HEALTHEAST (OUTPATIENT)
Dept: NURSING | Facility: CLINIC | Age: 74
End: 2019-08-20

## 2019-08-21 ENCOUNTER — OFFICE VISIT - HEALTHEAST (OUTPATIENT)
Dept: FAMILY MEDICINE | Facility: CLINIC | Age: 74
End: 2019-08-21

## 2019-08-21 DIAGNOSIS — H04.123 DRY EYES: ICD-10-CM

## 2019-08-21 DIAGNOSIS — R05.9 COUGH: ICD-10-CM

## 2019-09-09 ENCOUNTER — COMMUNICATION - HEALTHEAST (OUTPATIENT)
Dept: SCHEDULING | Facility: CLINIC | Age: 74
End: 2019-09-09

## 2019-09-11 ENCOUNTER — COMMUNICATION - HEALTHEAST (OUTPATIENT)
Dept: FAMILY MEDICINE | Facility: CLINIC | Age: 74
End: 2019-09-11

## 2019-09-11 DIAGNOSIS — G50.9 TRIGEMINAL NERVE DISORDER: ICD-10-CM

## 2019-09-13 ENCOUNTER — COMMUNICATION - HEALTHEAST (OUTPATIENT)
Dept: FAMILY MEDICINE | Facility: CLINIC | Age: 74
End: 2019-09-13

## 2019-09-13 DIAGNOSIS — E55.9 VITAMIN D DEFICIENCY: ICD-10-CM

## 2019-09-24 ENCOUNTER — OFFICE VISIT - HEALTHEAST (OUTPATIENT)
Dept: FAMILY MEDICINE | Facility: CLINIC | Age: 74
End: 2019-09-24

## 2019-09-24 ENCOUNTER — COMMUNICATION - HEALTHEAST (OUTPATIENT)
Dept: NURSING | Facility: CLINIC | Age: 74
End: 2019-09-24

## 2019-09-24 DIAGNOSIS — Z23 NEED FOR INFLUENZA VACCINATION: ICD-10-CM

## 2019-09-24 DIAGNOSIS — J20.9 ACUTE BRONCHITIS, UNSPECIFIED ORGANISM: ICD-10-CM

## 2019-09-25 ENCOUNTER — COMMUNICATION - HEALTHEAST (OUTPATIENT)
Dept: CARE COORDINATION | Facility: CLINIC | Age: 74
End: 2019-09-25

## 2019-09-26 ENCOUNTER — COMMUNICATION - HEALTHEAST (OUTPATIENT)
Dept: NURSING | Facility: CLINIC | Age: 74
End: 2019-09-26

## 2019-10-07 ENCOUNTER — COMMUNICATION - HEALTHEAST (OUTPATIENT)
Dept: FAMILY MEDICINE | Facility: CLINIC | Age: 74
End: 2019-10-07

## 2019-10-07 DIAGNOSIS — G50.9 TRIGEMINAL NERVE DISORDER: ICD-10-CM

## 2019-11-04 ENCOUNTER — COMMUNICATION - HEALTHEAST (OUTPATIENT)
Dept: FAMILY MEDICINE | Facility: CLINIC | Age: 74
End: 2019-11-04

## 2019-11-04 DIAGNOSIS — G50.9 TRIGEMINAL NERVE DISORDER: ICD-10-CM

## 2019-11-26 ENCOUNTER — COMMUNICATION - HEALTHEAST (OUTPATIENT)
Dept: NURSING | Facility: CLINIC | Age: 74
End: 2019-11-26

## 2019-11-27 ENCOUNTER — COMMUNICATION - HEALTHEAST (OUTPATIENT)
Dept: CARE COORDINATION | Facility: CLINIC | Age: 74
End: 2019-11-27

## 2019-11-29 ENCOUNTER — COMMUNICATION - HEALTHEAST (OUTPATIENT)
Dept: FAMILY MEDICINE | Facility: CLINIC | Age: 74
End: 2019-11-29

## 2019-11-29 DIAGNOSIS — G50.9 TRIGEMINAL NERVE DISORDER: ICD-10-CM

## 2019-12-04 ENCOUNTER — COMMUNICATION - HEALTHEAST (OUTPATIENT)
Dept: NURSING | Facility: CLINIC | Age: 74
End: 2019-12-04

## 2019-12-30 ENCOUNTER — COMMUNICATION - HEALTHEAST (OUTPATIENT)
Dept: FAMILY MEDICINE | Facility: CLINIC | Age: 74
End: 2019-12-30

## 2019-12-30 DIAGNOSIS — G50.9 TRIGEMINAL NERVE DISORDER: ICD-10-CM

## 2019-12-30 DIAGNOSIS — G51.8 FACIAL NEURALGIA: ICD-10-CM

## 2020-01-06 ENCOUNTER — OFFICE VISIT - HEALTHEAST (OUTPATIENT)
Dept: FAMILY MEDICINE | Facility: CLINIC | Age: 75
End: 2020-01-06

## 2020-01-06 DIAGNOSIS — G43.019 INTRACTABLE MIGRAINE WITHOUT AURA AND WITHOUT STATUS MIGRAINOSUS: ICD-10-CM

## 2020-01-06 DIAGNOSIS — Z12.11 SCREEN FOR COLON CANCER: ICD-10-CM

## 2020-01-06 DIAGNOSIS — M81.0 SENILE OSTEOPOROSIS: ICD-10-CM

## 2020-01-06 DIAGNOSIS — G50.0 TRIGEMINAL NEURALGIA: ICD-10-CM

## 2020-01-06 DIAGNOSIS — F41.9 ANXIETY: ICD-10-CM

## 2020-01-06 ASSESSMENT — ANXIETY QUESTIONNAIRES
7. FEELING AFRAID AS IF SOMETHING AWFUL MIGHT HAPPEN: NOT AT ALL
3. WORRYING TOO MUCH ABOUT DIFFERENT THINGS: SEVERAL DAYS
6. BECOMING EASILY ANNOYED OR IRRITABLE: NOT AT ALL
1. FEELING NERVOUS, ANXIOUS, OR ON EDGE: SEVERAL DAYS
4. TROUBLE RELAXING: MORE THAN HALF THE DAYS
5. BEING SO RESTLESS THAT IT IS HARD TO SIT STILL: MORE THAN HALF THE DAYS
GAD7 TOTAL SCORE: 7
2. NOT BEING ABLE TO STOP OR CONTROL WORRYING: SEVERAL DAYS
IF YOU CHECKED OFF ANY PROBLEMS ON THIS QUESTIONNAIRE, HOW DIFFICULT HAVE THESE PROBLEMS MADE IT FOR YOU TO DO YOUR WORK, TAKE CARE OF THINGS AT HOME, OR GET ALONG WITH OTHER PEOPLE: SOMEWHAT DIFFICULT

## 2020-01-06 ASSESSMENT — PATIENT HEALTH QUESTIONNAIRE - PHQ9: SUM OF ALL RESPONSES TO PHQ QUESTIONS 1-9: 12

## 2020-01-06 ASSESSMENT — MIFFLIN-ST. JEOR: SCORE: 1105.5

## 2020-01-27 ENCOUNTER — COMMUNICATION - HEALTHEAST (OUTPATIENT)
Dept: FAMILY MEDICINE | Facility: CLINIC | Age: 75
End: 2020-01-27

## 2020-01-27 DIAGNOSIS — G50.9 TRIGEMINAL NERVE DISORDER: ICD-10-CM

## 2020-01-27 DIAGNOSIS — G43.719 INTRACTABLE CHRONIC MIGRAINE WITHOUT AURA AND WITHOUT STATUS MIGRAINOSUS: ICD-10-CM

## 2020-02-21 ENCOUNTER — COMMUNICATION - HEALTHEAST (OUTPATIENT)
Dept: FAMILY MEDICINE | Facility: CLINIC | Age: 75
End: 2020-02-21

## 2020-02-21 DIAGNOSIS — G50.9 TRIGEMINAL NERVE DISORDER: ICD-10-CM

## 2020-03-20 ENCOUNTER — COMMUNICATION - HEALTHEAST (OUTPATIENT)
Dept: FAMILY MEDICINE | Facility: CLINIC | Age: 75
End: 2020-03-20

## 2020-03-20 DIAGNOSIS — G50.9 TRIGEMINAL NERVE DISORDER: ICD-10-CM

## 2020-04-17 ENCOUNTER — COMMUNICATION - HEALTHEAST (OUTPATIENT)
Dept: FAMILY MEDICINE | Facility: CLINIC | Age: 75
End: 2020-04-17

## 2020-04-17 DIAGNOSIS — G50.9 TRIGEMINAL NERVE DISORDER: ICD-10-CM

## 2020-05-07 ENCOUNTER — COMMUNICATION - HEALTHEAST (OUTPATIENT)
Dept: FAMILY MEDICINE | Facility: CLINIC | Age: 75
End: 2020-05-07

## 2020-05-07 DIAGNOSIS — G43.719 INTRACTABLE CHRONIC MIGRAINE WITHOUT AURA AND WITHOUT STATUS MIGRAINOSUS: ICD-10-CM

## 2020-05-14 ENCOUNTER — COMMUNICATION - HEALTHEAST (OUTPATIENT)
Dept: FAMILY MEDICINE | Facility: CLINIC | Age: 75
End: 2020-05-14

## 2020-05-14 DIAGNOSIS — G50.9 TRIGEMINAL NERVE DISORDER: ICD-10-CM

## 2020-06-10 ENCOUNTER — COMMUNICATION - HEALTHEAST (OUTPATIENT)
Dept: FAMILY MEDICINE | Facility: CLINIC | Age: 75
End: 2020-06-10

## 2020-06-10 DIAGNOSIS — G50.9 TRIGEMINAL NERVE DISORDER: ICD-10-CM

## 2020-06-15 ENCOUNTER — OFFICE VISIT - HEALTHEAST (OUTPATIENT)
Dept: FAMILY MEDICINE | Facility: CLINIC | Age: 75
End: 2020-06-15

## 2020-06-15 DIAGNOSIS — G50.9 TRIGEMINAL NERVE DISORDER: ICD-10-CM

## 2020-06-15 DIAGNOSIS — H04.123 DRY EYES: ICD-10-CM

## 2020-06-15 DIAGNOSIS — F41.9 ANXIETY: ICD-10-CM

## 2020-06-15 ASSESSMENT — ANXIETY QUESTIONNAIRES
6. BECOMING EASILY ANNOYED OR IRRITABLE: NOT AT ALL
4. TROUBLE RELAXING: SEVERAL DAYS
1. FEELING NERVOUS, ANXIOUS, OR ON EDGE: SEVERAL DAYS
7. FEELING AFRAID AS IF SOMETHING AWFUL MIGHT HAPPEN: NOT AT ALL
3. WORRYING TOO MUCH ABOUT DIFFERENT THINGS: SEVERAL DAYS
5. BEING SO RESTLESS THAT IT IS HARD TO SIT STILL: MORE THAN HALF THE DAYS
2. NOT BEING ABLE TO STOP OR CONTROL WORRYING: MORE THAN HALF THE DAYS
IF YOU CHECKED OFF ANY PROBLEMS ON THIS QUESTIONNAIRE, HOW DIFFICULT HAVE THESE PROBLEMS MADE IT FOR YOU TO DO YOUR WORK, TAKE CARE OF THINGS AT HOME, OR GET ALONG WITH OTHER PEOPLE: EXTREMELY DIFFICULT
GAD7 TOTAL SCORE: 7

## 2020-06-15 ASSESSMENT — PATIENT HEALTH QUESTIONNAIRE - PHQ9: SUM OF ALL RESPONSES TO PHQ QUESTIONS 1-9: 12

## 2020-06-18 ENCOUNTER — COMMUNICATION - HEALTHEAST (OUTPATIENT)
Dept: FAMILY MEDICINE | Facility: CLINIC | Age: 75
End: 2020-06-18

## 2020-06-18 DIAGNOSIS — G50.9 TRIGEMINAL NERVE DISORDER: ICD-10-CM

## 2020-06-30 ENCOUNTER — COMMUNICATION - HEALTHEAST (OUTPATIENT)
Dept: ADMINISTRATIVE | Facility: CLINIC | Age: 75
End: 2020-06-30

## 2020-07-14 ENCOUNTER — COMMUNICATION - HEALTHEAST (OUTPATIENT)
Dept: FAMILY MEDICINE | Facility: CLINIC | Age: 75
End: 2020-07-14

## 2020-07-14 DIAGNOSIS — G50.9 TRIGEMINAL NERVE DISORDER: ICD-10-CM

## 2020-07-15 ENCOUNTER — COMMUNICATION - HEALTHEAST (OUTPATIENT)
Dept: SCHEDULING | Facility: CLINIC | Age: 75
End: 2020-07-15

## 2020-07-28 ENCOUNTER — COMMUNICATION - HEALTHEAST (OUTPATIENT)
Dept: FAMILY MEDICINE | Facility: CLINIC | Age: 75
End: 2020-07-28

## 2020-07-28 DIAGNOSIS — G50.9 TRIGEMINAL NERVE DISORDER: ICD-10-CM

## 2020-08-05 ENCOUNTER — COMMUNICATION - HEALTHEAST (OUTPATIENT)
Dept: SCHEDULING | Facility: CLINIC | Age: 75
End: 2020-08-05

## 2020-08-06 ENCOUNTER — COMMUNICATION - HEALTHEAST (OUTPATIENT)
Dept: FAMILY MEDICINE | Facility: CLINIC | Age: 75
End: 2020-08-06

## 2020-08-08 ENCOUNTER — TRANSFERRED RECORDS (OUTPATIENT)
Dept: HEALTH INFORMATION MANAGEMENT | Facility: CLINIC | Age: 75
End: 2020-08-08

## 2020-08-08 ASSESSMENT — MIFFLIN-ST. JEOR
SCORE: 1051.07
SCORE: 1051.07
SCORE: 1010.24
SCORE: 1010.24
SCORE: 1051.07
SCORE: 1010.24

## 2020-08-09 ENCOUNTER — COMMUNICATION - HEALTHEAST (OUTPATIENT)
Dept: SCHEDULING | Facility: CLINIC | Age: 75
End: 2020-08-09

## 2020-08-12 ASSESSMENT — MIFFLIN-ST. JEOR
SCORE: 1062.41

## 2020-08-13 ENCOUNTER — SURGERY - HEALTHEAST (OUTPATIENT)
Dept: SURGERY | Facility: HOSPITAL | Age: 75
End: 2020-08-13

## 2020-08-13 ENCOUNTER — SURGERY - HEALTHEAST (OUTPATIENT)
Dept: GASTROENTEROLOGY | Facility: HOSPITAL | Age: 75
End: 2020-08-13

## 2020-08-13 ENCOUNTER — ANESTHESIA - HEALTHEAST (OUTPATIENT)
Dept: SURGERY | Facility: HOSPITAL | Age: 75
End: 2020-08-13

## 2020-08-13 ENCOUNTER — TRANSFERRED RECORDS (OUTPATIENT)
Dept: HEALTH INFORMATION MANAGEMENT | Facility: CLINIC | Age: 75
End: 2020-08-13
Payer: COMMERCIAL

## 2020-08-14 ENCOUNTER — ANESTHESIA - HEALTHEAST (OUTPATIENT)
Dept: SURGERY | Facility: HOSPITAL | Age: 75
End: 2020-08-14

## 2020-08-14 ENCOUNTER — TRANSFERRED RECORDS (OUTPATIENT)
Dept: HEALTH INFORMATION MANAGEMENT | Facility: CLINIC | Age: 75
End: 2020-08-14

## 2020-08-14 ENCOUNTER — SURGERY - HEALTHEAST (OUTPATIENT)
Dept: SURGERY | Facility: HOSPITAL | Age: 75
End: 2020-08-14

## 2020-08-14 ASSESSMENT — MIFFLIN-ST. JEOR
SCORE: 1022.94

## 2020-08-15 ASSESSMENT — MIFFLIN-ST. JEOR
SCORE: 1024.76

## 2020-08-17 ENCOUNTER — COMMUNICATION - HEALTHEAST (OUTPATIENT)
Dept: FAMILY MEDICINE | Facility: CLINIC | Age: 75
End: 2020-08-17

## 2020-08-17 ENCOUNTER — COMMUNICATION - HEALTHEAST (OUTPATIENT)
Dept: NURSING | Facility: CLINIC | Age: 75
End: 2020-08-17

## 2020-08-17 DIAGNOSIS — G43.719 INTRACTABLE CHRONIC MIGRAINE WITHOUT AURA AND WITHOUT STATUS MIGRAINOSUS: ICD-10-CM

## 2020-08-21 ENCOUNTER — OFFICE VISIT - HEALTHEAST (OUTPATIENT)
Dept: FAMILY MEDICINE | Facility: CLINIC | Age: 75
End: 2020-08-21

## 2020-08-21 DIAGNOSIS — E87.8 ELECTROLYTE ABNORMALITY: ICD-10-CM

## 2020-08-21 DIAGNOSIS — K25.9 PYLORUS ULCER, UNSPECIFIED CHRONICITY: ICD-10-CM

## 2020-08-21 DIAGNOSIS — D50.0 IRON DEFICIENCY ANEMIA DUE TO CHRONIC BLOOD LOSS: ICD-10-CM

## 2020-08-21 DIAGNOSIS — G50.0 TRIGEMINAL NEURALGIA: ICD-10-CM

## 2020-08-21 LAB
ALBUMIN SERPL-MCNC: 3.5 G/DL (ref 3.5–5)
ALP SERPL-CCNC: 146 U/L (ref 45–120)
ALT SERPL W P-5'-P-CCNC: 12 U/L (ref 0–45)
ANION GAP SERPL CALCULATED.3IONS-SCNC: 13 MMOL/L (ref 5–18)
AST SERPL W P-5'-P-CCNC: 15 U/L (ref 0–40)
BASOPHILS # BLD AUTO: 0.1 THOU/UL (ref 0–0.2)
BASOPHILS NFR BLD AUTO: 1 % (ref 0–2)
BILIRUB SERPL-MCNC: 0.2 MG/DL (ref 0–1)
BUN SERPL-MCNC: 8 MG/DL (ref 8–28)
CALCIUM SERPL-MCNC: 9 MG/DL (ref 8.5–10.5)
CHLORIDE BLD-SCNC: 104 MMOL/L (ref 98–107)
CO2 SERPL-SCNC: 22 MMOL/L (ref 22–31)
CREAT SERPL-MCNC: 0.75 MG/DL (ref 0.6–1.1)
EOSINOPHIL # BLD AUTO: 0.2 THOU/UL (ref 0–0.4)
EOSINOPHIL NFR BLD AUTO: 2 % (ref 0–6)
ERYTHROCYTE [DISTWIDTH] IN BLOOD BY AUTOMATED COUNT: 17.6 % (ref 11–14.5)
GFR SERPL CREATININE-BSD FRML MDRD: >60 ML/MIN/1.73M2
GLUCOSE BLD-MCNC: 103 MG/DL (ref 70–125)
HCT VFR BLD AUTO: 38.4 % (ref 35–47)
HGB BLD-MCNC: 11.9 G/DL (ref 12–16)
LYMPHOCYTES # BLD AUTO: 2.5 THOU/UL (ref 0.8–4.4)
LYMPHOCYTES NFR BLD AUTO: 27 % (ref 20–40)
MAGNESIUM SERPL-MCNC: 1.9 MG/DL (ref 1.8–2.6)
MCH RBC QN AUTO: 23.7 PG (ref 27–34)
MCHC RBC AUTO-ENTMCNC: 30.9 G/DL (ref 32–36)
MCV RBC AUTO: 77 FL (ref 80–100)
MONOCYTES # BLD AUTO: 0.5 THOU/UL (ref 0–0.9)
MONOCYTES NFR BLD AUTO: 5 % (ref 2–10)
NEUTROPHILS # BLD AUTO: 6.2 THOU/UL (ref 2–7.7)
NEUTROPHILS NFR BLD AUTO: 66 % (ref 50–70)
PLATELET # BLD AUTO: 680 THOU/UL (ref 140–440)
PMV BLD AUTO: 7.7 FL (ref 7–10)
POTASSIUM BLD-SCNC: 4.9 MMOL/L (ref 3.5–5)
PROT SERPL-MCNC: 6.4 G/DL (ref 6–8)
RBC # BLD AUTO: 5 MILL/UL (ref 3.8–5.4)
SODIUM SERPL-SCNC: 139 MMOL/L (ref 136–145)
WBC: 9.4 THOU/UL (ref 4–11)

## 2020-09-14 ENCOUNTER — COMMUNICATION - HEALTHEAST (OUTPATIENT)
Dept: FAMILY MEDICINE | Facility: CLINIC | Age: 75
End: 2020-09-14

## 2020-09-14 DIAGNOSIS — G50.9 TRIGEMINAL NERVE DISORDER: ICD-10-CM

## 2020-09-18 ENCOUNTER — RECORDS - HEALTHEAST (OUTPATIENT)
Dept: ADMINISTRATIVE | Facility: OTHER | Age: 75
End: 2020-09-18

## 2020-09-18 ENCOUNTER — TRANSFERRED RECORDS (OUTPATIENT)
Dept: HEALTH INFORMATION MANAGEMENT | Facility: CLINIC | Age: 75
End: 2020-09-18

## 2020-09-21 ENCOUNTER — TRANSFERRED RECORDS (OUTPATIENT)
Dept: HEALTH INFORMATION MANAGEMENT | Facility: CLINIC | Age: 75
End: 2020-09-21

## 2020-09-29 ENCOUNTER — TRANSFERRED RECORDS (OUTPATIENT)
Dept: HEALTH INFORMATION MANAGEMENT | Facility: CLINIC | Age: 75
End: 2020-09-29

## 2020-09-29 ENCOUNTER — HOSPITAL ENCOUNTER (OUTPATIENT)
Dept: RADIOLOGY | Facility: HOSPITAL | Age: 75
Discharge: HOME | End: 2020-09-29
Attending: INTERNAL MEDICINE

## 2020-09-29 DIAGNOSIS — K29.90 GASTRODUODENITIS: ICD-10-CM

## 2020-10-06 ENCOUNTER — TRANSFERRED RECORDS (OUTPATIENT)
Dept: HEALTH INFORMATION MANAGEMENT | Facility: CLINIC | Age: 75
End: 2020-10-06

## 2020-10-09 ENCOUNTER — RECORDS - HEALTHEAST (OUTPATIENT)
Dept: ADMINISTRATIVE | Facility: OTHER | Age: 75
End: 2020-10-09

## 2020-10-12 ENCOUNTER — COMMUNICATION - HEALTHEAST (OUTPATIENT)
Dept: FAMILY MEDICINE | Facility: CLINIC | Age: 75
End: 2020-10-12

## 2020-10-12 DIAGNOSIS — G50.9 TRIGEMINAL NERVE DISORDER: ICD-10-CM

## 2020-10-16 ENCOUNTER — COMMUNICATION - HEALTHEAST (OUTPATIENT)
Dept: FAMILY MEDICINE | Facility: CLINIC | Age: 75
End: 2020-10-16

## 2020-10-16 DIAGNOSIS — K21.9 GASTROESOPHAGEAL REFLUX DISEASE WITHOUT ESOPHAGITIS: ICD-10-CM

## 2020-10-17 ENCOUNTER — COMMUNICATION - HEALTHEAST (OUTPATIENT)
Dept: FAMILY MEDICINE | Facility: CLINIC | Age: 75
End: 2020-10-17

## 2020-10-17 DIAGNOSIS — K21.9 GASTROESOPHAGEAL REFLUX DISEASE WITHOUT ESOPHAGITIS: ICD-10-CM

## 2020-10-23 ENCOUNTER — COMMUNICATION - HEALTHEAST (OUTPATIENT)
Dept: FAMILY MEDICINE | Facility: CLINIC | Age: 75
End: 2020-10-23

## 2020-10-23 DIAGNOSIS — F33.1 MODERATE EPISODE OF RECURRENT MAJOR DEPRESSIVE DISORDER (H): ICD-10-CM

## 2020-10-23 RX ORDER — NORTRIPTYLINE HCL 25 MG
CAPSULE ORAL
Qty: 360 CAPSULE | Refills: 3 | Status: SHIPPED | OUTPATIENT
Start: 2020-10-23 | End: 2021-12-05

## 2020-11-10 ENCOUNTER — COMMUNICATION - HEALTHEAST (OUTPATIENT)
Dept: FAMILY MEDICINE | Facility: CLINIC | Age: 75
End: 2020-11-10

## 2020-11-10 DIAGNOSIS — G50.9 TRIGEMINAL NERVE DISORDER: ICD-10-CM

## 2020-11-15 ENCOUNTER — COMMUNICATION - HEALTHEAST (OUTPATIENT)
Dept: FAMILY MEDICINE | Facility: CLINIC | Age: 75
End: 2020-11-15

## 2020-11-15 DIAGNOSIS — G43.719 INTRACTABLE CHRONIC MIGRAINE WITHOUT AURA AND WITHOUT STATUS MIGRAINOSUS: ICD-10-CM

## 2020-11-17 RX ORDER — TOPIRAMATE 50 MG/1
TABLET, FILM COATED ORAL
Qty: 90 TABLET | Refills: 2 | Status: SHIPPED | OUTPATIENT
Start: 2020-11-17 | End: 2021-08-04

## 2020-12-04 ENCOUNTER — COMMUNICATION - HEALTHEAST (OUTPATIENT)
Dept: FAMILY MEDICINE | Facility: CLINIC | Age: 75
End: 2020-12-04

## 2020-12-04 DIAGNOSIS — G50.9 TRIGEMINAL NERVE DISORDER: ICD-10-CM

## 2021-01-01 ENCOUNTER — COMMUNICATION - HEALTHEAST (OUTPATIENT)
Dept: FAMILY MEDICINE | Facility: CLINIC | Age: 76
End: 2021-01-01

## 2021-01-01 DIAGNOSIS — G50.9 TRIGEMINAL NERVE DISORDER: ICD-10-CM

## 2021-01-01 DIAGNOSIS — G51.8 FACIAL NEURALGIA: ICD-10-CM

## 2021-01-01 DIAGNOSIS — K21.9 GASTROESOPHAGEAL REFLUX DISEASE WITHOUT ESOPHAGITIS: ICD-10-CM

## 2021-01-04 RX ORDER — OMEPRAZOLE 40 MG/1
CAPSULE, DELAYED RELEASE ORAL
Qty: 90 CAPSULE | Refills: 1 | Status: SHIPPED | OUTPATIENT
Start: 2021-01-04 | End: 2022-02-07

## 2021-01-04 RX ORDER — OXCARBAZEPINE 150 MG/1
TABLET, FILM COATED ORAL
Qty: 270 TABLET | Refills: 2 | Status: SHIPPED | OUTPATIENT
Start: 2021-01-04 | End: 2021-10-06

## 2021-02-01 ENCOUNTER — COMMUNICATION - HEALTHEAST (OUTPATIENT)
Dept: FAMILY MEDICINE | Facility: CLINIC | Age: 76
End: 2021-02-01

## 2021-02-01 DIAGNOSIS — G50.9 TRIGEMINAL NERVE DISORDER: ICD-10-CM

## 2021-03-01 ENCOUNTER — COMMUNICATION - HEALTHEAST (OUTPATIENT)
Dept: FAMILY MEDICINE | Facility: CLINIC | Age: 76
End: 2021-03-01

## 2021-03-01 DIAGNOSIS — G50.9 TRIGEMINAL NERVE DISORDER: ICD-10-CM

## 2021-03-23 ENCOUNTER — OFFICE VISIT - HEALTHEAST (OUTPATIENT)
Dept: FAMILY MEDICINE | Facility: CLINIC | Age: 76
End: 2021-03-23

## 2021-03-23 DIAGNOSIS — R05.9 COUGH: ICD-10-CM

## 2021-03-29 ENCOUNTER — COMMUNICATION - HEALTHEAST (OUTPATIENT)
Dept: FAMILY MEDICINE | Facility: CLINIC | Age: 76
End: 2021-03-29

## 2021-03-29 DIAGNOSIS — G50.9 TRIGEMINAL NERVE DISORDER: ICD-10-CM

## 2021-04-26 ENCOUNTER — COMMUNICATION - HEALTHEAST (OUTPATIENT)
Dept: FAMILY MEDICINE | Facility: CLINIC | Age: 76
End: 2021-04-26

## 2021-04-26 DIAGNOSIS — G50.9 TRIGEMINAL NERVE DISORDER: ICD-10-CM

## 2021-05-04 ENCOUNTER — OFFICE VISIT - HEALTHEAST (OUTPATIENT)
Dept: FAMILY MEDICINE | Facility: CLINIC | Age: 76
End: 2021-05-04

## 2021-05-04 DIAGNOSIS — D50.0 IRON DEFICIENCY ANEMIA DUE TO CHRONIC BLOOD LOSS: ICD-10-CM

## 2021-05-04 DIAGNOSIS — F41.9 INSOMNIA SECONDARY TO ANXIETY: ICD-10-CM

## 2021-05-04 DIAGNOSIS — Z78.0 POSTMENOPAUSAL: ICD-10-CM

## 2021-05-04 DIAGNOSIS — M94.9 DISORDER OF BONE AND CARTILAGE: ICD-10-CM

## 2021-05-04 DIAGNOSIS — Z00.01 ENCOUNTER FOR GENERAL ADULT MEDICAL EXAMINATION WITH ABNORMAL FINDINGS: ICD-10-CM

## 2021-05-04 DIAGNOSIS — H04.123 DRY EYES: ICD-10-CM

## 2021-05-04 DIAGNOSIS — Z13.1 ENCOUNTER FOR SCREENING FOR DIABETES MELLITUS: ICD-10-CM

## 2021-05-04 DIAGNOSIS — R63.4 WEIGHT LOSS, ABNORMAL: ICD-10-CM

## 2021-05-04 DIAGNOSIS — L20.9 ATOPIC DERMATITIS, MILD: ICD-10-CM

## 2021-05-04 DIAGNOSIS — K08.89 TOOTH PAIN: ICD-10-CM

## 2021-05-04 DIAGNOSIS — Z13.220 ENCOUNTER FOR SCREENING FOR LIPOID DISORDERS: ICD-10-CM

## 2021-05-04 DIAGNOSIS — F32.0 MILD MAJOR DEPRESSION (H): ICD-10-CM

## 2021-05-04 DIAGNOSIS — K25.7 PYLORIC ULCER, CHRONIC: ICD-10-CM

## 2021-05-04 DIAGNOSIS — R93.89 ABNORMAL CHEST CT: ICD-10-CM

## 2021-05-04 DIAGNOSIS — F51.05 INSOMNIA SECONDARY TO ANXIETY: ICD-10-CM

## 2021-05-04 DIAGNOSIS — M89.9 DISORDER OF BONE AND CARTILAGE: ICD-10-CM

## 2021-05-04 DIAGNOSIS — E55.9 VITAMIN D DEFICIENCY: ICD-10-CM

## 2021-05-04 DIAGNOSIS — G50.9 TRIGEMINAL NERVE DISORDER: ICD-10-CM

## 2021-05-04 DIAGNOSIS — G50.0 TRIGEMINAL NEURALGIA: ICD-10-CM

## 2021-05-04 LAB
ALBUMIN SERPL-MCNC: 3.4 G/DL (ref 3.5–5)
ALP SERPL-CCNC: 147 U/L (ref 45–120)
ALT SERPL W P-5'-P-CCNC: 18 U/L (ref 0–45)
ANION GAP SERPL CALCULATED.3IONS-SCNC: 11 MMOL/L (ref 5–18)
AST SERPL W P-5'-P-CCNC: 16 U/L (ref 0–40)
BILIRUB SERPL-MCNC: 0.1 MG/DL (ref 0–1)
BUN SERPL-MCNC: 21 MG/DL (ref 8–28)
CALCIUM SERPL-MCNC: 8.8 MG/DL (ref 8.5–10.5)
CHLORIDE BLD-SCNC: 106 MMOL/L (ref 98–107)
CHOLEST SERPL-MCNC: 207 MG/DL
CO2 SERPL-SCNC: 26 MMOL/L (ref 22–31)
CREAT SERPL-MCNC: 0.68 MG/DL (ref 0.6–1.1)
ERYTHROCYTE [DISTWIDTH] IN BLOOD BY AUTOMATED COUNT: 16.6 % (ref 11–14.5)
FASTING STATUS PATIENT QL REPORTED: YES
GFR SERPL CREATININE-BSD FRML MDRD: >60 ML/MIN/1.73M2
GLUCOSE BLD-MCNC: 89 MG/DL (ref 70–125)
HCT VFR BLD AUTO: 37.7 % (ref 35–47)
HDLC SERPL-MCNC: 74 MG/DL
HGB BLD-MCNC: 11.1 G/DL (ref 12–16)
LDLC SERPL CALC-MCNC: 114 MG/DL
MCH RBC QN AUTO: 23.8 PG (ref 27–34)
MCHC RBC AUTO-ENTMCNC: 29.4 G/DL (ref 32–36)
MCV RBC AUTO: 81 FL (ref 80–100)
PLATELET # BLD AUTO: 437 THOU/UL (ref 140–440)
PMV BLD AUTO: 9.5 FL (ref 7–10)
POTASSIUM BLD-SCNC: 5.2 MMOL/L (ref 3.5–5)
PROT SERPL-MCNC: 6.4 G/DL (ref 6–8)
RBC # BLD AUTO: 4.67 MILL/UL (ref 3.8–5.4)
SODIUM SERPL-SCNC: 143 MMOL/L (ref 136–145)
TRIGL SERPL-MCNC: 93 MG/DL
WBC: 6.8 THOU/UL (ref 4–11)

## 2021-05-04 RX ORDER — FERROUS SULFATE 325(65) MG
1 TABLET ORAL
Qty: 30 TABLET | Refills: 0 | Status: SHIPPED | OUTPATIENT
Start: 2021-05-04 | End: 2022-11-29

## 2021-05-04 RX ORDER — QUETIAPINE FUMARATE 25 MG/1
25 TABLET, FILM COATED ORAL AT BEDTIME
Qty: 30 TABLET | Refills: 0 | Status: SHIPPED | OUTPATIENT
Start: 2021-05-04 | End: 2022-02-07

## 2021-05-04 RX ORDER — DESONIDE 0.5 MG/G
CREAM TOPICAL
Qty: 60 G | Refills: 1 | Status: SHIPPED | OUTPATIENT
Start: 2021-05-04 | End: 2023-07-21

## 2021-05-04 RX ORDER — POLYVINYL ALCOHOL 14 MG/ML
2 SOLUTION/ DROPS OPHTHALMIC PRN
Qty: 15 ML | Refills: 0 | Status: SHIPPED | OUTPATIENT
Start: 2021-05-04 | End: 2021-08-17

## 2021-05-04 ASSESSMENT — MIFFLIN-ST. JEOR: SCORE: 991.68

## 2021-05-04 ASSESSMENT — PATIENT HEALTH QUESTIONNAIRE - PHQ9: SUM OF ALL RESPONSES TO PHQ QUESTIONS 1-9: 10

## 2021-05-10 ENCOUNTER — COMMUNICATION - HEALTHEAST (OUTPATIENT)
Dept: FAMILY MEDICINE | Facility: CLINIC | Age: 76
End: 2021-05-10

## 2021-05-20 ENCOUNTER — COMMUNICATION - HEALTHEAST (OUTPATIENT)
Dept: FAMILY MEDICINE | Facility: CLINIC | Age: 76
End: 2021-05-20

## 2021-05-20 DIAGNOSIS — G50.9 TRIGEMINAL NERVE DISORDER: ICD-10-CM

## 2021-05-24 RX ORDER — ACETAMINOPHEN AND CODEINE PHOSPHATE 300; 30 MG/1; MG/1
1 TABLET ORAL EVERY 6 HOURS PRN
Qty: 120 TABLET | Refills: 0 | Status: SHIPPED | OUTPATIENT
Start: 2021-05-26 | End: 2022-03-03

## 2021-05-25 ENCOUNTER — RECORDS - HEALTHEAST (OUTPATIENT)
Dept: ADMINISTRATIVE | Facility: CLINIC | Age: 76
End: 2021-05-25

## 2021-05-26 ENCOUNTER — RECORDS - HEALTHEAST (OUTPATIENT)
Dept: ADMINISTRATIVE | Facility: CLINIC | Age: 76
End: 2021-05-26

## 2021-05-26 ASSESSMENT — PATIENT HEALTH QUESTIONNAIRE - PHQ9: SUM OF ALL RESPONSES TO PHQ QUESTIONS 1-9: 12

## 2021-05-26 NOTE — TELEPHONE ENCOUNTER
Controlled Substance Refill Request  Medication:   Requested Prescriptions     Pending Prescriptions Disp Refills     acetaminophen-codeine (TYLENOL #3) 300-30 mg per tablet [Pharmacy Med Name: ACETAMINOPHEN/COD #3 (300/30MG) TAB] 120 tablet 0     Sig: TAKE 1 TABLET BY MOUTH THREE TIMES DAILY AS NEEDED FOR PAIN     Date Last Fill: 1/30/19  Pharmacy: [id number]   Submit electronically to pharmacy  Controlled Substance Agreement on File:   Encounter-Level CSA Scan Date - 09/08/2016:    Scan on 9/9/2016 11:19 AM (below)         Last office visit: Last office visit pertaining to requested medication was 3/5/19.

## 2021-05-27 ENCOUNTER — RECORDS - HEALTHEAST (OUTPATIENT)
Dept: ADMINISTRATIVE | Facility: CLINIC | Age: 76
End: 2021-05-27

## 2021-05-27 VITALS
HEART RATE: 80 BPM | BODY MASS INDEX: 19.34 KG/M2 | SYSTOLIC BLOOD PRESSURE: 116 MMHG | HEIGHT: 64 IN | DIASTOLIC BLOOD PRESSURE: 62 MMHG | WEIGHT: 113.3 LBS

## 2021-05-27 ASSESSMENT — PATIENT HEALTH QUESTIONNAIRE - PHQ9
SUM OF ALL RESPONSES TO PHQ QUESTIONS 1-9: 10
SUM OF ALL RESPONSES TO PHQ QUESTIONS 1-9: 12

## 2021-05-28 ASSESSMENT — ANXIETY QUESTIONNAIRES
GAD7 TOTAL SCORE: 7
GAD7 TOTAL SCORE: 7

## 2021-05-28 NOTE — TELEPHONE ENCOUNTER
Who is calling:  Patient  Reason for Call:  Patient is calling back to check status of below note, writer advised it is waiting for Dr. Murillo to address.  Date of last appointment with primary care: none  Okay to leave a detailed message: Yes

## 2021-05-28 NOTE — TELEPHONE ENCOUNTER
Medication Question or Clarification  Who is calling: Patient  What medication are you calling about? (include dose and sig) Pharmacy is not refilling the Tylenol #3 as it is refill too soon.  Patient is asking if Dr Murillo could help. Patient stated she was taking medication more then what was prescribed.  Who prescribed the medication?: Dr Murillo  What is your question/concern?: see above  Pharmacy: [facility]  Okay to leave a detailed message?: Yes  Site CMT - Please call the pharmacy to obtain any additional needed information.

## 2021-05-28 NOTE — PROGRESS NOTES
Assessment   Julisa Chaney is a 74 y.o. female who is establish patient to my practice here with   Chief Complaint   Patient presents with     Follow-up     mental health and meds; Pt states she had a change of insurance and her OVs are no longer covered and she has been getting billed 600$-900$ with each visit        Julisa was seen today for follow-up.    Diagnoses and all orders for this visit:    Moderate episode of recurrent major depressive disorder (H)  Comments:  will incease the dose to 50 mg as feeling quite sad all the time   Orders:  -     nortriptyline (PAMELOR) 25 MG capsule; 2 cap twice daily  -     sertraline (ZOLOFT) 100 MG tablet; Take 1 tablet (100 mg total) by mouth daily.    Protein-calorie malnutrition, severe (H)  Comments:  gained 23 IB since last visit 3 month ago     Trigeminal nerve disorder  Comments:  chronic pain left side of the face , which  affecting her appetite , continue topamax one tab daily   Orders:  -     acetaminophen-codeine (TYLENOL #3) 300-30 mg per tablet; Take 1 tablet by mouth every 6 (six) hours as needed for pain.    Enlarged and hypertrophic nails  Comments:  some of them are digging in the skin   Orders:  -     Ambulatory referral to Podiatry          Plan:     Patient Instructions   Dear Julisa,    I am glad you are feeling better and gained 23 pound since last visit , most likely depression is getting better with the help of Zoloft I will increase the dose to 100 mg today just to better control the depression symptoms  Goal is to get enough nutrition,   We will refill your Tylenol No. 3 as needed  Try to avoid Tums or any calcium supplement for now  Restart your vitamin D and Prilosec which you already have at home  Follow-up in 6 months    Mara Murillo MD 5/9/2019 12:00 PM            Subjective:      HPI: Julisa Chaney is a 74 y.o. female    with known history of depression anxiety, trigeminal neuralgia for which she takes chronic pain medication and  antiepileptic, and also nortriptyline, and protein calorie malnutrition, new concern discussed today is the hypertrophic nails for which she was seen last time almost 5 to 6 years ago but 1 of the nails on the big toe is kind of digging in and going inside the skin and and little painful     Recent past history is significant for  hospitalization at [facility] 2/6 to 2/9/19.   She had been having ongoing increased fatigue, low motivation, depression and low appetite for the past couple of months.  Prior to her  hospitalization she was more sedated and encephalopathic , only thing came back + was elevated calcium level with normal PTH and thyroid hormone , she was also malnourished with very low hemoglobin       Social History     Social History Narrative    Lives alone in the house, relay in TV dinner    [employer] help with house maintenance    Daughter lives close by.    Mara Murillo MD 7/9/2018 1:49 PM         Past Medical History:   Diagnosis Date     High cholesterol      Migraines      Past Surgical History:   Procedure Laterality Date     MD TOTAL ABDOM HYSTERECTOMY      Description: Total Abdominal Hysterectomy;  Recorded: 08/17/2010;     Chocolate; Mirtazapine; and Penicillins  Current Outpatient Medications   Medication Sig Dispense Refill     acetaminophen-codeine (TYLENOL #3) 300-30 mg per tablet Take 1 tablet by mouth every 6 (six) hours as needed for pain. 120 tablet 0     aspirin-acetaminophen-caffeine (EXCEDRIN MIGRAINE) 250-250-65 mg per tablet Take 1 tablet by mouth every 6 (six) hours as needed for pain.       cholecalciferol, vitamin D3, (VITAMIN D3) 2,000 unit Tab One tab daily (Patient taking differently: Take 2,000 Units by mouth daily. One tab daily      ) 90 tablet 3     ferrous sulfate 325 (65 FE) MG tablet Take 1 tablet by mouth 2 (two) times a day.       multivitamin (ONE A DAY) per tablet Take 1 tablet by mouth daily. 120 tablet 2     nortriptyline (PAMELOR) 25 MG capsule 2 cap  twice daily 360 capsule 4     omeprazole (PRILOSEC) 20 MG capsule Take 20 mg by mouth daily before breakfast.       OXcarbazepine (TRILEPTAL) 150 MG tablet Take 3 tablets (450 mg total) by mouth at bedtime. 270 tablet 3     polyvinyl alcohol (LIQUIFILM TEARS) 1.4 % ophthalmic solution Administer 2 drops to both eyes as needed for dry eyes.       sertraline (ZOLOFT) 100 MG tablet Take 1 tablet (100 mg total) by mouth daily. 90 tablet 3     topiramate (TOPAMAX) 50 MG tablet Take 1 tablet (50 mg total) by mouth daily. 90 tablet 3     No current facility-administered medications for this visit.      Family History   Problem Relation Age of Onset     Cancer Father      Testicular cancer Grandchild 17     Breast cancer Mother      Breast cancer Sister      Breast cancer Maternal Aunt      Breast cancer Sister        Patient Active Problem List   Diagnosis     Osteopenia     Hyperlipidemia     Migraine Headache     Trigeminal neuralgia     Underweight     Protein-calorie malnutrition, severe (H)     Counseling regarding advanced directives and goals of care     Controlled substance agreement signed     Hypercalcemia       Review of Systems    Pertinent ROS noted in HPI        Objective:     Vitals:    05/09/19 1131   BP: 114/58   Pulse: 92   Weight: 124 lb (56.2 kg)       Physical Exam:   Physical Exam:  General Appearance:  Appears comfortable, Alert, cooperative, no distress,   Head: Normocephalic, without obvious abnormality, atraumatic  Eyes: PERRL, conjunctiva/corneas clear, EOM's intact, both eyes             Nose: Nares normal, no drainage   Throat: Lips, mucosa, and tongue normal; teeth and gums normal  Neck: Supple, symmetrical, trachea midline, no adenopathy;                      Lungs: Clear to auscultation bilaterally, respirations unlabored  Heart: Regular rate and rhythm, S1 and S2 normal, no murmur, rubs or gallop  Abdomen: Soft, non-tender, bowel sounds active all four quadrants,   no masses, no  organomegaly  Extremities: Extremities normal, atraumatic, no cyanosis or edema  Pulses: DP pulses are 1-2+ bilat.    Skin: no rashes or lesions  Neurologic: normal and equal strength bilat in upper and lower extremities     Mara Murillo MD

## 2021-05-28 NOTE — TELEPHONE ENCOUNTER
Who is calling:  Patient  Reason for Call: Pharmacy says patient cannot have refill until 5/2/19 because of directions on RX.  Patient currently taking 4 tablets daily, RX says take 1 tablet three times daily) for many years, Please call patient ASAP an d advise, Patient is out of medication.   Date of last appointment with primary care: 3/5/19   Has the patient been recently seen:  Yes  Okay to leave a detailed message: Yes

## 2021-05-28 NOTE — TELEPHONE ENCOUNTER
As per discussion with Haven jamison CMT  we will call the pharmacy to refill the prescription right now but patient was advised to take time #3 only 2 times a day and no other early prescriptions were reviewed failed from now onward    Mara Murillo MD 4/25/2019 3:34 PM

## 2021-05-28 NOTE — TELEPHONE ENCOUNTER
Patient notified per  to not take this medication more than she has prescribed it for. Will contact the pharmacy and authorize 1 time early refill per .  Haven Ho, [facility] CMT

## 2021-05-28 NOTE — PATIENT INSTRUCTIONS - HE
Dear Julisa,    I am glad you are feeling better and gained 23 pound since last visit , most likely depression is getting better with the help of Zoloft I will increase the dose to 100 mg today just to better control the depression symptoms  Goal is to get enough nutrition,   We will refill your Tylenol No. 3 as needed  Try to avoid Tums or any calcium supplement for now  Restart your vitamin D and Prilosec which you already have at home  Follow-up in 6 months    Mara Murillo MD 5/9/2019 12:00 PM

## 2021-05-28 NOTE — TELEPHONE ENCOUNTER
Patient Returning Call  Reason for call:  Caller returning call to check on the status of this request.  Information relayed to patient:  Pending providers review and approval.  Patient has additional questions: yes If YES, what are your questions/concerns:  Patient stated this was filled on 3/18/19 and overdue for a refill.    Okay to leave a detailed message?: Yes

## 2021-05-28 NOTE — TELEPHONE ENCOUNTER
Controlled Substance Refill Request  Medication:   Requested Prescriptions     Pending Prescriptions Disp Refills     acetaminophen-codeine (TYLENOL #3) 300-30 mg per tablet [Pharmacy Med Name: ACETAMINOPHEN/COD #3 (300/30MG) TAB] 120 tablet 0     Sig: TAKE 1 TABLET BY MOUTH THREE TIMES DAILY AS NEEDED FOR PAIN     Date Last Fill: 3/25/19  Pharmacy: [facility]   Submit electronically to pharmacy    Controlled Substance Agreement on File:   Encounter-Level CSA Scan Date - 09/08/2016:    Scan on 9/9/2016 11:19 AM (below)         Last office visit with primary: 3/5/2019

## 2021-05-28 NOTE — PROGRESS NOTES
Care Guide called the patient for Clinic Care Coordination outreach follow up on goals and action steps.    Discussed the following goals...  Experience Less Depression and Increased Motivation    Patient states that she's been feeling better and less depressed, especially since the snow has melted.    MD has increased the dosage on her medications, which has helped as well.  Follow Up with the Dentist    Patient hasn't been thinking about this much, because she has been experiencing some pain in her face.    She does state that she does still want to keep this goal, as she still plans to do this.    Patient reports...  She has a follow up appointment on 5/9/19 with Dr Murillo.  I had suggested to maybe discuss that facial pain with her at that time.  Patient states that is what she plans to do.  She stated that everything else is going well, and there's nothing else to report at this time.    ________________________  Next Outreach Date: 6/14/19  Planned Outreach Frequency: every 6 weeks  Preferred Phone Number: [phone]    RN Assessment Date: 11/8/18  Goal Setting Date: 1/23/15  Updated Care Plan Date: 11/26/18    Chronic Medical Diagnosis/Physical Health Needs:  Osteopenia  Hyperlipidemia  Migraine Headache  Trigeminal Neuralgia  Underweight  Protein-Calorie Malnutrition, Severe    Medications:  Medication Management: Alissa (Daughter)  Pharmacy: [facility] off [email or web address] in [place]    Preventative Measures:  Medical Insurance: Blue Cross Blue Shield Medicare High Value  ID: [id number]  Medicare Part A and B  ID: [id number]    Annual Physical Exam: 5/10/18  Mammogram: 6/12/18  Dexa Scan: DUE  Colonoscopy: 5/3/06 DUE  (cologuard ordered 10/15/18)  Depression Follow Up: 12/4/18  Fall Risk Assessment: 5/10/18  Advance Directives: On File  Td Shot: 5/10/18  Zostavax Shot: 11/7/07  PCV-13 Shot: 10/29/15  Pneumovax Shot: 10/10/14    Mental Health Diagnosis/Needs:   Anxiety  Moderate, Major  Depression    Mental Health Provider:   None    Hygiene:  Independent    Rest/Sleep:  Unknown    Nutrition:  Meals on Wheels once a day  Boost once a day  Has had at least 6 teeth extracted, may have had more  No desire to eat or cook    Substance/CD:  Use of Cigarettes: Quit in 2014  Second Hand Smoke Exposure: None  Alcohol Use: None  Street Drugs: None    Family Planning:  NA    Employment/Education:  Retired    Housing:  Single Family Home  Bedroom and Bathroom on main level    Interpersonal:      Household Members:  Self    Safety:  Hoarding tendencies  Laundry in basement    Daily Activities/Exercise:  Scrapbooking    Social Network:  Daughter lives close by (recently started a full time job; off work Tue/Thur)  2 sisters live in the area  Grandchildren    Episcopal/Spiritual:  Unknown    Transportation:  Does not drive  Daughter and Grandson drive her to appointments, shopping, Rx pickup     Financial/Expenses:  Has a 401K    Understanding of Health and Wellbeing/Barriers:  Health Literacy: Reasonable to good understanding but do not feel able to engage with advice at this time    Current Services/Support:  Meals on Wheels once a day

## 2021-05-29 NOTE — TELEPHONE ENCOUNTER
Controlled Substance Refill Request  Medication:   Requested Prescriptions     Pending Prescriptions Disp Refills     acetaminophen-codeine (TYLENOL #3) 300-30 mg per tablet [Pharmacy Med Name: ACETAMINOPHEN/COD #3 (300/30MG) TAB] 120 tablet 0     Sig: TAKE 1 TABLET BY MOUTH EVERY 6 HOURS AS NEEDED FOR PAIN     Date Last Fill: 5/9/19  Pharmacy: [facility]   Submit electronically to pharmacy    Controlled Substance Agreement on File:   Encounter-Level CSA Scan Date - 09/08/2016:    Scan on 9/9/2016 11:19 AM (below)         Last office visit with primary: 5/9/2019

## 2021-05-29 NOTE — TELEPHONE ENCOUNTER
Who is calling:  Patient  Reason for Call:  Patient stated she is out and would like her refill sent in today.  Date of last appointment with primary care: 5/9/19  Okay to leave a detailed message: No

## 2021-05-29 NOTE — TELEPHONE ENCOUNTER
FYI - Status Update  Who is Calling: Patient  Update: Patient is calling requesting the medication below to be filled today.  Patient reports she is out of the medication.  Please contact patient when this has been sent to her pharmacy.  Okay to leave a detailed message?:  Yes  [phone]

## 2021-05-30 VITALS — BODY MASS INDEX: 16.41 KG/M2 | WEIGHT: 98.6 LBS

## 2021-05-30 VITALS — WEIGHT: 95.8 LBS | BODY MASS INDEX: 15.94 KG/M2

## 2021-05-30 VITALS — BODY MASS INDEX: 16.87 KG/M2 | WEIGHT: 101.38 LBS

## 2021-05-30 VITALS — BODY MASS INDEX: 16.19 KG/M2 | WEIGHT: 97.3 LBS

## 2021-05-30 NOTE — PROGRESS NOTES
Scheduled Follow Up Call: Attempt 1  Community Health Worker called and left a message for the patient.    If the patient is returning my call, please transfer her to me, Elmer, at [phone].    Plan for outreach  1.  Complete goals regarding depression and motivation per patients response.    ________________________  Next Outreach Date: 7/9/19  Planned Outreach Frequency: every 6 weeks  Preferred Phone Number: [phone]    RN Assessment Date: 11/8/18  Goal Setting Date: 1/23/15  Updated Care Plan Date: 11/26/18    Chronic Medical Diagnosis/Physical Health Needs:  Osteopenia  Hyperlipidemia  Migraine Headache  Trigeminal Neuralgia  Underweight  Protein-Calorie Malnutrition, Severe    Medications:  Medication Management: Alissa (Daughter)  Pharmacy: [facility] off of [address] in [place]    Preventative Measures:  Medical Insurance: Blue Cross Blue Shield Medicare High Value  ID: [id number]  Medicare Part A and B  ID: [id number]    Annual Physical Exam: 5/10/18  Mammogram: 6/12/18  Dexa Scan: DUE  Colonoscopy: 5/3/06 DUE  (cologuard ordered 10/15/18)  Depression Follow Up: 12/4/18  Fall Risk Assessment: 5/10/18  Advance Directives: On File  Td Shot: 5/10/18  Zostavax Shot: 11/7/07  PCV-13 Shot: 10/29/15  Pneumovax Shot: 10/10/14    Mental Health Diagnosis/Needs:   Anxiety  Moderate, Major Depression    Mental Health Provider:   None    Hygiene:  Independent    Rest/Sleep:  Unknown    Nutrition:  Meals on Wheels once a day  Boost once a day  Has had at least 6 teeth extracted, may have had more  No desire to eat or cook    Substance/CD:  Use of Cigarettes: Quit in 2014  Second Hand Smoke Exposure: None  Alcohol Use: None  Street Drugs: None    Family Planning:  NA    Employment/Education:  Retired    Housing:  Single Family Home  Bedroom and Bathroom on main level    Interpersonal:      Household Members:  Self    Safety:  Hoarding tendencies  Laundry in basement    Daily  Activities/Exercise:  Scrapbooking    Social Network:  Daughter lives close by (recently started a full time job; off work Tue/Thur)  2 sisters live in the area  Grandchildren    Pentecostalism/Spiritual:  Unknown    Transportation:  Does not drive  Daughter and Grandson drive her to appointments, shopping, Rx pickup     Financial/Expenses:  Has a 401K    Understanding of Health and Wellbeing/Barriers:  Health Literacy: Reasonable to good understanding but do not feel able to engage with advice at this time    Current Services/Support:  Meals on Wheels once a day

## 2021-05-30 NOTE — PROGRESS NOTES
Scheduled Follow Up Call: Attempt 2   Community Health Worker called and left a message for the patient. If the patient is returning my call, please transfer the patient to [place] at ext. [phone].     I have called the patient 2 times over the past two weeks and have been unsuccessful in reaching him/her.  The patient has not returned any of my messages.             I have mailed a letter to the patient requesting a return call and will continue attempting to reach out to the patient in one month. I will also check the patient's chart for upcoming appointments, ER reports that may contain a new phone number, or any other recent activity.    Due to new standard work as of 7/11/19. This will be my first attempt for outreach.    Plan for outreach  1.  Complete goals regarding depression and motivation per patients response.    ________________________  Next Outreach Date: 8/19/19  Planned Outreach Frequency: every 6 weeks  Preferred Phone Number: [phone]    RN Assessment Date: 11/8/18  Goal Setting Date: 1/23/15  Updated Care Plan Date: 11/26/18

## 2021-05-30 NOTE — PROGRESS NOTES
Due to new standard work as of 7/11/19. This will be my first attempt for outreach.    Scheduled Follow Up Call: Attempt 1  Community Health Worker called and left a message for the patient.    If the patient is returning my call, please transfer her to me, Elmer, at [phone].    Plan for outreach  1.  Complete goals regarding depression and motivation per patients response.    ________________________  Next Outreach Date: 7/17/19  Planned Outreach Frequency: every 6 weeks  Preferred Phone Number: [phone]    RN Assessment Date: 11/8/18  Goal Setting Date: 1/23/15  Updated Care Plan Date: 11/26/18

## 2021-05-30 NOTE — TELEPHONE ENCOUNTER
Who is calling:  Patient   Reason for Call:  Patient would like a call back with the status of her refill request for the Tylenol #3.  Patient reported she is out of her medication.  Date of last appointment with primary care: 5/9/2019  Okay to leave a detailed message: No

## 2021-05-30 NOTE — TELEPHONE ENCOUNTER
FYI - Status Update  Who is Calling: Patient  Update: Patient called requesting this to be done as soon as possible.  Patient reports she is out of her medication.  Okay to leave a detailed message?:  No

## 2021-05-30 NOTE — TELEPHONE ENCOUNTER
Refill request for medication: Tylenol #3  Last visit addressing this medication: 5/9/19  Follow up plan 6  months  Last refill on 6/18/19, quantity #120   CSA completed 7/9/18   checked  07/17/19, last dispensed refill 6/18/19    Appointment: Not due     Miracle Samayoa LPN    Patient updated on medication status.

## 2021-05-30 NOTE — TELEPHONE ENCOUNTER
Controlled Substance Refill Request  Medication:   Requested Prescriptions     Pending Prescriptions Disp Refills     acetaminophen-codeine (TYLENOL #3) 300-30 mg per tablet [Pharmacy Med Name: ACETAMINOPHEN/COD #3 (300/30MG) TAB] 120 tablet 0     Sig: TAKE 1 TABLET BY MOUTH EVERY 6 HOURS AS NEEDED FOR PAIN     Date Last Fill: 6/18/19  Pharmacy: [facility]   Submit electronically to pharmacy    Controlled Substance Agreement on File:   Encounter-Level CSA Scan Date - 09/08/2016:    Scan on 9/9/2016 11:19 AM (below)         Last office visit with primary: 5/9/2019

## 2021-05-31 VITALS — WEIGHT: 100.4 LBS | BODY MASS INDEX: 16.2 KG/M2

## 2021-05-31 NOTE — TELEPHONE ENCOUNTER
FYI - Status Update  Who is Calling: Patient  Update: Questioning the status of her refill request for Tylenol #3.  Okay to leave a detailed message?:  No return call needed

## 2021-05-31 NOTE — TELEPHONE ENCOUNTER
Controlled Substance Refill Request  Medication:   Requested Prescriptions     Pending Prescriptions Disp Refills     acetaminophen-codeine (TYLENOL #3) 300-30 mg per tablet [Pharmacy Med Name: ACETAMINOPHEN/COD #3 (300/30MG) TAB] 120 tablet 0     Sig: TAKE 1 TABLET BY MOUTH EVERY 6 HOURS AS NEEDED FOR PAIN     Date Last Fill: 7.17.19  [facility]   Submit electronically to pharmacy  Controlled Substance Agreement on File:   Encounter-Level CSA Scan Date - 09/08/2016:    Scan on 9/9/2016 11:19 AM (below)         Last office visit: Last office visit pertaining to requested medication was 5.9.19.

## 2021-05-31 NOTE — TELEPHONE ENCOUNTER
Who is calling:  Patient  Reason for Call:  Patient is out of medication.  The request is 72 business hours old.  Please advise.   Date of last appointment with primary care: NA  Okay to leave a detailed message: Yes

## 2021-05-31 NOTE — PROGRESS NOTES
Next Outreach Date: 9/20/19  Planned Outreach Frequency: Monthly  Preferred Phone Number: [phone]     RN Assessment Date: 11/8/18  Goal Setting Date: 1/23/15  Updated Care Plan Date: 11/26/18

## 2021-05-31 NOTE — TELEPHONE ENCOUNTER
Refill request for medication: 8/12/19  Last visit addressing this medication: 5/9/19  Follow up plan 6  months  Last refill on 7/17/19, quantity 120   CSA completed needs updated- 7/8/18   checked  08/16/19, last dispensed refill 7/17/9    Appointment: Not due     Jossy Ellison, ROVERTOA

## 2021-05-31 NOTE — PROGRESS NOTES
Assessment/Plan:        Diagnoses and all orders for this visit:    Cough  -     ipratropium-albuterol 0.5-2.5 mg/3 mL nebulizer solution 3 mL (DUO-NEB)  -     azithromycin (ZITHROMAX) 250 MG tablet; Take 500 mg (2 x 250 mg tablets) on day 1 followed by 250 mg (1 tablet) on days 2-5.  Dispense: 6 tablet; Refill: 0  -     XR Chest 2 Views  -     benzonatate (TESSALON PERLES) 100 MG capsule; Take 1 capsule (100 mg total) by mouth 3 (three) times a day as needed for cough.  Dispense: 30 capsule; Refill: 0    Dry eyes  -     polyvinyl alcohol (LIQUIFILM TEARS) 1.4 % ophthalmic solution; Administer 2 drops to both eyes as needed for dry eyes.  Dispense: 15 mL; Refill: 0  There is mild improvement with her respiration following the DuoNeb.  Examination at that point did show that she had wheezing that is diffuse and some coarse breath sounds.  I did do an x-ray that showed minimal new and subtle peripheral opacities that may be early infectious process or inflammatory process.  She was given some antibiotics as noted above and she can follow-up if not improved.        Subjective:    Patient ID: Julisa Chaney is a 74 y.o. female.    Cough   This is a new (started suddenly about 1 week ago.,has no sinus congestion or runny nose. Had a history of smoking for long quit 2014.) problem. The current episode started in the past 7 days. The problem occurs constantly. The problem has been gradually worsening. Associated symptoms include chest pain, congestion and coughing. Pertinent negatives include no abdominal pain, anorexia, chills, fatigue, fever, headaches, neck pain, sore throat or visual change. The symptoms are aggravated by coughing and exertion. She has tried nothing for the symptoms.       The following portions of the patient's history were reviewed and updated as appropriate: allergies, current medications, past family history, past medical history, past social history, past surgical history and problem  list.    Review of Systems   Constitutional: Negative for chills, fatigue and fever.   HENT: Positive for congestion. Negative for sinus pressure, sinus pain and sore throat.    Respiratory: Positive for cough, chest tightness and shortness of breath. Negative for wheezing.    Cardiovascular: Positive for chest pain.   Gastrointestinal: Negative for abdominal pain and anorexia.   Musculoskeletal: Negative for neck pain.   Neurological: Negative for headaches.     Vitals:    08/21/19 1144   BP: 110/72   Pulse: 72   Resp: 20   Temp: 98.1  F (36.7  C)   TempSrc: Oral   SpO2: 95%   Weight: 135 lb 6 oz (61.4 kg)             Objective:    Physical Exam   Constitutional: She appears well-developed and well-nourished. No distress.   Afebrile and not pale,in no respiratory distress.   HENT:   Right Ear: Tympanic membrane normal.   Left Ear: Tympanic membrane normal.   Nose: Right sinus exhibits no maxillary sinus tenderness and no frontal sinus tenderness. Left sinus exhibits no maxillary sinus tenderness and no frontal sinus tenderness.   Mouth/Throat: No oropharyngeal exudate, posterior oropharyngeal edema or posterior oropharyngeal erythema.   Neck: Normal range of motion. No thyromegaly present.   Cardiovascular: Normal rate and regular rhythm.   Pulmonary/Chest: Effort normal.   Reduced breath sounds but improved with some Duoneb and she has minimal wheezing diffusely with coarse breath sounds.   Abdominal: Soft.   Lymphadenopathy:     She has no cervical adenopathy.

## 2021-06-01 VITALS — HEIGHT: 66 IN | BODY MASS INDEX: 17.36 KG/M2 | WEIGHT: 108 LBS

## 2021-06-01 VITALS — BODY MASS INDEX: 18.33 KG/M2 | WEIGHT: 110 LBS | HEIGHT: 65 IN

## 2021-06-01 VITALS — WEIGHT: 108.2 LBS | HEIGHT: 66 IN | BODY MASS INDEX: 17.39 KG/M2

## 2021-06-01 NOTE — PROGRESS NOTES
Clinic Care Coordination Contact    Situation-Received a request from [facility] for patient to transition to [facility] Maintenance.    Background- Patient enrolled 11/8/2018.  Seeking assistance with Medicare part D, dental work, sleep and appetite assistance.  Patient able to meet with SW & RN and obtain assistance.     Action-Chart review. All goals completed.  No new goals identified.  Will notify PCP.    Response: Patient to transition to [facility] Maintenance.        Self-Care for Headaches   Most headaches aren't serious and can be relieved with self-care. But some headaches may be a sign of another health problem like eye trouble or high blood pressure. To find the best treatment, learn what kind of headaches you get. For tension headaches, self-care will usually help. To treat migraines, ask your healthcare provider for advice. It is also possible to get both tension and migraine headaches. Self-care involves relieving the pain and avoiding headache  triggers  if you can.    Ways to reduce pain and tension  Try these steps:    Apply a cold compress or ice pack to the pain site.    Drink fluids. If nausea makes it hard to drink, try sucking on ice.    Rest. Protect yourself from bright light and loud noises.    Calm your emotions by imagining a peaceful scene.    Massage tight neck, shoulder, and head muscles.    To relax muscles, soak in a hot bath or use a hot shower.  Use medicines  Aspirin or aspirin substitutes, such as ibuprofen and acetaminophen, can relieve headache. Remember: Never give aspirin to anyone 18 years old or younger because of the risk of developing Reye syndrome. Use pain medicines only when necessary.  Track your headaches  Keeping a headache diary can help you and your healthcare provider identify what's causing your headaches:    Note when each headache happens.    Identify your activities and the foods you've eaten 6 to 8 hours before the headache began.    Look for any trends or  "\"triggers.\"  Signs of tension headache  Any of the following can be signs:    Dull pain or feeling of pressure in a tight band around your head    Pain in your neck or shoulders    Headache without a definite beginning or end    Headache after an activity such as driving or working on a computer  Signs of migraine  Any of the following can be signs:    Throbbing pain on one or both sides of your head    Nausea or vomiting    Extreme sensitivity to light, sound, and smells    Bright spots, flashes, or other visual changes    Pain or nausea so severe that you can't continue your daily activities  Call your healthcare provider    If you have any of the following symptoms, contact your healthcare provider:    A headache that lingers after a recent injury or bump to the head.    A fever with a stiff neck or pain when you bend your head toward your chest.    A headache along with slurred speech, changes in your vision, or numbness or weakness in your arms or legs.    A headache for longer than 3 days.    Frequent headaches, especially in the morning.    Headaches with seizures     Seek immediate medical attention if you have a headache that you would call \"the worst headache you have ever had.\"       High Cholesterol: Assessing Your Risk    Have you been told that your cholesterol is too high? If so, you could be heading for a heart attack, also known as acute myocardial infarction (AMI), or stroke. This is especially true if you have other risk factors for heart disease. Get smart about cholesterol and your heart disease risk. This sheet can help you understand your heart disease risk and how your cholesterol level affects it. Talk to your healthcare provider about how to get started controlling your cholesterol.  Why is high cholesterol a problem?  Blood cholesterol is a fatty substance. The body uses it to make membranes in cells and for hormone production. It travels through the bloodstream and is used by the tissues " for normal function. When blood cholesterol is high, it forms plaque and causes inflammation. The plaque builds up in the walls of arteries (blood vessels that carry blood from the heart to the body). This narrows the opening for blood flow. Over time, the heart may not get enough oxygen. This can lead to coronary artery disease, heart attack, or stroke.  3 steps to assessing your risk  Step 1. Find your risk factors for heart disease and stroke  How your cholesterol numbers affect your heart health depends on other risk factors for heart attack and stroke. Check off each risk factor below that applies to you:  Are you a man 45 years old or older or a woman 55 years old or older?  Does your family have a history of heart problems before the age of 55 in male relatives or age 65 in female relatives? This includes heart attack, coronary heart disease, or atherosclerosis.  Do you have high blood pressure? Do you take medicine to treat high blood pressure?  Do you smoke?  Do you have diabetes?  Do you exercise very little or not very often? Recommendations are for 30 minutes of exercise at least 5 days a week. If you are not doing cardiovascular exercise as often as these recommendations, it may not be enough and you may be at higher risk for elevated cholesterol and heart disease.  Do you eat a diet that is high in saturated or trans fats, cholesterol, sugar, or alcohol? You may be at increased risk for heart disease if you do not eat enough fruits, vegetables, lean meats and eat sugars or drink alcohol sparingly.  Have you been told you have high cholesterol? Do you take medicine to control your cholesterol?   Step 2. Test your cholesterol  Have your cholesterol tested every 5 years after the age of 20 and more often if you have risk factors. Cholesterol testing most often needs no preparation. Sometimes you may be asked to fast (not eat) before your test. A blood sample is taken and sent to a lab. There, the amount  of cholesterol and triglyceride in your blood is measured. There are 2 types of cholesterol in the sample. The first is HDL ( good cholesterol ). The second is LDL ( bad cholesterol ). Cholesterol test results are most often shown as the total of HDL and LDL cholesterol numbers. You may also be told the separate HDL and LDL cholesterol results.     Step 3. Discuss the results with your healthcare provider   If your cholesterol levels are higher than normal, your healthcare provider will help you with steps to take to lower your levels. Steps may include lifestyle changes like diet, physical activity, and quitting smoking, and medicine to lower bad cholesterol levels.  If you have high cholesterol, you may need your cholesterol level tested more often to make sure your medicine and lifestyle changes are working to reduce your risks of having a heart attack or stroke.  Lifestyle Changes to Control Cholesterol  You can control your cholesterol through diet, exercise, weight management, quitting smoking, stress management, and taking your medicines right. These things can also lower your risk for cardiovascular disease.  Eating healthy  Your healthcare provider will give you information on diet changes you may need to make. Your provider may recommend that you see a registered dietitian for help with diet changes. Changes may include:  Cutting back on the amount of fat and cholesterol in your meals  Eating less salt (sodium). This is especially important if you have high blood pressure.  Eating more fresh vegetables and fruits  Eating lean proteins such as fish, poultry, beans, and peas  Eating less red meat and processed meats  Using low-fat dairy products  Using vegetable and nut oils in limited amounts  Limiting how many sweets and processed foods like chips, cookies, and baked goods that you eat   Limiting how many sugar-sweetened beverages you drink  Limiting how often you eat out  Getting exercise  Regular  exercise is a good way to help your body control cholesterol. Regular exercise can help in many ways. It can:  Raise your good cholesterol  Help lower your bad cholesterol  Let blood flow better through your body  Give more oxygen to your muscles and tissues  Help you manage your weight  Help your heart pump better  Lower your blood pressure  Your healthcare provider may recommend that you get more physical activity if you haven't been active. Your provider may recommend that you get moderate to vigorous physical activity for at least 40 minutes each day. You should do this for at least 3 to 4 days each week. A few examples of moderate to vigorous activity are:  Walking at a brisk pace. This is about 3 to 4 miles per hour.  Jogging or running  Swimming or water aerobics  Hiking  Dancing  Martial arts  Tennis  Riding a bicycle or stationary bike  Dancing  Managing your weight  If you are overweight or obese, your healthcare provider will work with you to help you lose weight and lower your BMI (body mass index). Making diet changes and getting more physical activity can help. Changing your diet will help you lose weight more easily than adding exercise.  Quitting smoking  Smoking and other tobacco use can raise cholesterol and make it harder to control. Quitting is tough. But millions of people have given up tobacco for good. You can quit, too! Think about some of the reasons below to quit smoking. Do any of them make you think twice about your smoking habit?  Stop smoking because it:  Keeps your cholesterol high, even if you make all the other changes you re supposed to  Damages your body. It especially harms your heart, lungs, skin, and blood vessels.  Makes you more likely to have a heart attack (acute myocardial infarction), stroke, or cancer  Stains your teeth  Makes your skin, clothes, and breath smell bad  Costs a lot of money  Controlling stress   Learn ways to control stress. This will help you deal with  stress in your home and work life. Controlling stress can greatly lower your risk of getting cardiovascular disease.  Making the most of medicines  Healthy eating and exercise are a good start to keeping your cholesterol down. But you may need some extra help from medicine. If your doctor prescribes medicine, be sure to take it exactly as directed. Remember:  Tell your healthcare provider about all other medicines you take. This includes vitamins and herbs.  Tell your healthcare provider if you have any side effects after starting to take a medicine. Examples of side effects to watch for include muscle aches, weakness, blurred vision, rust-colored urine, yellowing of eyes or skin (jaundice), and headache.  Don t skip a dose or stop taking your medicine because you feel better or because your cholesterol numbers go down. Never stop taking your medicine unless your healthcare provider has told you it s OK.  Ask your healthcare provider if you have any questions about your medicines.  High risk groups  Some people may need to take medicines called statins to control their cholesterol. This is in addition to eating a healthy diet and getting regular exercise.  Statins can help you stay healthy. They can also help prevent a heart attack or stroke. You may need to take a statin if you are in one of these groups:  Adults who have had a heart attack or stroke. Or adults who have had peripheral vascular disease, a ministroke (transient ischemic attack), or stable or unstable angina. This group also includes people who have had a procedure to restore blood flow through a blocked artery. These procedures include percutaneous coronary intervention, angioplasty, stent, and open-heart bypass surgery.  Adults who have diabetes. Or adults who are at higher risk of having a heart attack or stroke and have an LDL cholesterol level of 70 to 189 mg/dL  Adults who are 21 years old or older and have an LDL cholesterol level of 190 mg/dL  or higher.  If you are in a high-risk group, talk with your healthcare provider about your treatment goals. Make sure you understand why these goals are important, based on your own health history and your family history of heart disease or high cholesterol.  Make a plan to have regular cholesterol checks. You may need to fast before getting this test. Also ask your provider about any side effects your medicines may cause. Let your provider know about any side effects you have. You may need to take more than one medicine to reach the cholesterol goals that you and your provider decide on.    1044-2233 [employer]. [address] [place] [address]. All rights reserved. This information is not intended as a substitute for professional medical care. Always follow your healthcare professional's instructions.     Depression  Everyone feels down at times. The blues are a natural part of life. But an unhappy period that s intense or lasts for more than a couple of weeks can be a sign of depression. Depression is a serious illness. It can disrupt the lives of family and friends. If you know someone you think may be depressed, find out what you can do to help.    Know the serious signals  Warning signals for suicide include:    Threats or talk of suicide    Statements such as  I won t be a problem much longer  or  Nothing matters     Giving away possessions or making a will or  arrangements    Buying a gun or other weapon    Sudden, unexplained cheerfulness or calm after a period of depression  If you notice any of these signs, get help right away. Call a health care professional, mental health clinic, or suicide hotline and ask what action to take. In an emergency, don t hesitate to call the police.    [facility] Mental Health Crisis Lines:  [facility] [phone]  [facility] [phone]  [facility] [phone]  [facility] [phone]  [facility], [facility]  [phone]  [facility], Children [phone]  [facility], Adults [phone]  [facility], [facility] [phone]

## 2021-06-01 NOTE — TELEPHONE ENCOUNTER
"  Appt for tomorrow       CC:  Status update from most recent visit     \"No better since initial visit\"     No wheezing / no shortness of breath      > Yes \"rattling\" cough sometimes in chest    > No fevers - some occ chills though     A/P:   > Fluids - ajay warm liquids between now and the appt               "

## 2021-06-01 NOTE — PROGRESS NOTES
ASSESSMENT:  1. Acute bronchitis, unspecified organism    As this is been going on now for more than a month and she really did not get any better from the azithromycin that she was given, I am going to go ahead and treat her with Levaquin 500 mg a day for 10 days.  Symptomatic treatment encouraged along with this.  Good hydration encouraged.  Follow-up if not improving after that.    - levoFLOXacin (LEVAQUIN) 500 MG tablet; Take 1 tablet (500 mg total) by mouth daily for 10 days.  Dispense: 10 tablet; Refill: 0    2. Need for influenza vaccination            PLAN:  There are no Patient Instructions on file for this visit.    No orders of the defined types were placed in this encounter.    Medications Discontinued During This Encounter   Medication Reason     azithromycin (ZITHROMAX) 250 MG tablet Therapy completed     benzonatate (TESSALON PERLES) 100 MG capsule Therapy completed       No follow-ups on file.    CHIEF COMPLAINT:  Chief Complaint   Patient presents with     Cough     Saw Anabelle on 8/21 with cough and dry eyes, still has cough, SOB, feels is deep into chest now - tessalon perles did not help much       HISTORY OF PRESENT ILLNESS:  Julisa is a 74 y.o. female presenting to the clinic today for persisting symptoms of cold.  She states that she has been sick now for about 6 weeks.  She was sick 2 weeks when she saw Dr. Ahn on August 21.  She had a cough at that time which was productive she was also feeling some dryness in her eyes and nasal drainage.  She was given azithromycin and she thought that she got better for a couple of days but then quickly after that started getting worse again.  Now she is worse than she has been during his whole course of illness.  She has a quite very productive cough and sometimes feels that she is short of breath from it.  She feels that it is deep down into her chest now.  She is producing a yellow to green sputum on most occasions.  She was given some Tessalon Perles  which did not seem to be helping her at this point.  She does not have current fever but she has had some chills in the past.  She is not sleeping all that well because of the cough.  Her appetite is been pretty good and she has had no nausea or vomiting    REVIEW OF SYSTEMS:     All other systems are negative.    PFSH:    Reviewed      TOBACCO USE:  Social History     Tobacco Use   Smoking Status Former Smoker     Packs/day: 1.00     Years: 50.00     Pack years: 50.00     Last attempt to quit: 2014     Years since quittin.8   Smokeless Tobacco Never Used   Tobacco Comment    stop smoking packet given       VITALS:  Vitals:    19 1312   BP: 104/62   Patient Site: Left Arm   Patient Position: Sitting   Cuff Size: Adult Regular   Pulse: 86   Temp: 98  F (36.7  C)   SpO2: 95%   Weight: 138 lb 9.6 oz (62.9 kg)     Wt Readings from Last 3 Encounters:   19 138 lb 9.6 oz (62.9 kg)   19 135 lb 6 oz (61.4 kg)   19 124 lb (56.2 kg)     Body mass index is 22.37 kg/m .    PHYSICAL EXAM:  Constitutional:  Reveals mildly ill-appearing female but in no acute distress.  Vitals:  Per nursing notes.    Ears are clear bilaterally.  Eyes and nose are normal.  Oropharynx is a bit red in the back of the throat.  Neck is without lymphadenopathy.    Cardiac: Regular rate and rhythm without murmurs, rubs, or gallops.     Legs normal without edema  Palpation of the radial pulse normal.  Lungs: Clear.  Respiratory effort normal.    Neurologic:  Cranial nerves II-XII intact.     Psychiatric:  Mood appropriate, memory intact.       MEDICATIONS:  Current Outpatient Medications   Medication Sig Dispense Refill     acetaminophen-codeine (TYLENOL #3) 300-30 mg per tablet TAKE 1 TABLET BY MOUTH EVERY 6 HOURS AS NEEDED FOR PAIN 120 tablet 0     aspirin-acetaminophen-caffeine (EXCEDRIN MIGRAINE) 250-250-65 mg per tablet Take 1 tablet by mouth every 6 (six) hours as needed for pain.       cholecalciferol, vitamin D3,  2,000 unit Tab Take 1 tablet (2,000 Units total) by mouth daily. One tab daily 90 tablet 4     DAILY-DAVID tablet TAKE 1 TABLET BY MOUTH DAILY 100 tablet 3     ferrous sulfate 325 (65 FE) MG tablet Take 1 tablet by mouth 2 (two) times a day.       nortriptyline (PAMELOR) 25 MG capsule 2 cap twice daily 360 capsule 4     omeprazole (PRILOSEC) 20 MG capsule Take 20 mg by mouth daily before breakfast.       OXcarbazepine (TRILEPTAL) 150 MG tablet Take 3 tablets (450 mg total) by mouth at bedtime. 270 tablet 3     polyvinyl alcohol (LIQUIFILM TEARS) 1.4 % ophthalmic solution Administer 2 drops to both eyes as needed for dry eyes. 15 mL 0     sertraline (ZOLOFT) 100 MG tablet Take 1 tablet (100 mg total) by mouth daily. 90 tablet 3     topiramate (TOPAMAX) 50 MG tablet Take 1 tablet (50 mg total) by mouth daily. 90 tablet 3     levoFLOXacin (LEVAQUIN) 500 MG tablet Take 1 tablet (500 mg total) by mouth daily for 10 days. 10 tablet 0     No current facility-administered medications for this visit.

## 2021-06-01 NOTE — TELEPHONE ENCOUNTER
Controlled Substance Refill Request  Medication:   Requested Prescriptions     Pending Prescriptions Disp Refills     DAILY-DAVID tablet [Pharmacy Med Name: DAILY-DAVID TABLETS] 120 tablet 0     Sig: TAKE 1 TABLET BY MOUTH DAILY     acetaminophen-codeine (TYLENOL #3) 300-30 mg per tablet [Pharmacy Med Name: ACETAMINOPHEN/COD #3 (300/30MG) TAB] 120 tablet 0     Sig: TAKE 1 TABLET BY MOUTH EVERY 6 HOURS AS NEEDED FOR PAIN     Date Last Fill: 8/16/19  Pharmacy: [id number]   Submit electronically to pharmacy  Controlled Substance Agreement on File:   Encounter-Level CSA Scan Date - 09/08/2016:    Scan on 9/9/2016 11:19 AM (below)         Last office visit: Last office visit pertaining to requested medication was 8/21/19.    Provider Refill Request  Medication:  MTV  RN can NOT refill this medication per RN refill protocol because med is not covered by policy/route to provider     Alyce Baker RN [facility] Triage/Medication Refill

## 2021-06-01 NOTE — PROGRESS NOTES
My Clinic Care Coordination Wellness Plan  This Maintenance Wellness Plan provides private information in regard to the work I have done with my Care Team at my Primary Care Clinic.  This document provides insight on the goals I have worked hard to achieve.  My Care Team congratulates me on my journey to become well.  With the assistance of the Clinic Care Coordination Team and my Primary Care Provider, I have succeeded in improving areas of my health that I identified as barriers to becoming well.  I will continue to seek wellness and use the skills I have obtained to further my journey.  My Community Health Worker will follow up with me in 2 months.  In the meantime, if I should have any questions or concerns I will contact my Community Health Worker.    [address]  [address]  [phone]    My Preferred Method of Contact:  Phone: [phone]    My Primary/Preferred Language:  English    Preferred Learning Style:  Reading information, Face to face discussion, Pictures/Diagrams and Hands on teaching    Emergency Contact: Extended Emergency Contact Information  Primary Emergency Contact: Alissa Del Real   [facility]  Home Phone: [phone]  Mobile Phone: [phone]  Relation: Child  Secondary Emergency Contact: Cesia Lu   [facility]  Home Phone: [phone]  Mobile Phone: [phone]  Relation: Sibling     PCP:  Mara Murillo MD  Specialists:    Care Team            Mara Murillo MD   [phone] PCP - General, Family Medicine    Raj King MD   [phone] Physician, Neurology    [facility]; Follow Up PRN    [employer]   [phone]     Alissa Del Real, Daughter   [phone]     Emergency Contact; Consent to Communicate signed 5/10/18    Deirdre Reyes, CNP   [phone] Assigned PCP    Meghan Mcfarlane CHW   [phone] Community Health Worker, Primary Care - Aleshia Askew, RN  Lead Care Coordinator, Primary Care -           Accomplishments:  Goals        Patient Stated      COMPLETED: I have accomplished experiencing less depression and increased motivation. (pt-stated)      Personal Plan  I have less depression, more motivation and I am feeling a lot better. If things start to get worse I will talk with Dr. Murillo.           COMPLETED: I have accomplished getting needed money to get my dental work completed. (pt-stated)      Personal Plan  I have taken the money out of my 401K and will make an appointment to see my surgeon soon. I am not in need of assistance any longer with this goal. If I have more questions about completing dental work I will call my dentists office.        COMPLETED: I was able to learn what Medicare Part D options were available to me and chose one. (pt-stated)      Personal Plan:  I will make sure I know the coverage details for my new Medicare Part D plan (such as deductibles, co-pays and services covered).  I will provide my new insurance card to my providers so my chart is updated with my most current insurance information.  I will make sure to renew or choose a new plan during the annual renewal time in 2019.          COMPLETED: My care has been well coordinated. (pt-stated)      Personal Plan:  I will continue to stay in contact with my Care Team and follow up in the recommended time frames.              COMPLETED: My energy has increased and is stable. (pt-stated)      Personal Plan:  I will continue to drink 1 Boost every day.  I will continue to eat something from the kitchen each time I go in there.  I will continue to receive meals from [employer].  I will continue to make sure I get sleep each night.              Advanced Directive/Living Will: On file with the clinic    Clinical Emergency Plan    Self-Care for Headaches     Most headaches aren't serious and can be relieved with self-care. But some headaches may be a sign of another health problem  "like eye trouble or high blood pressure. To find the best treatment, learn what kind of headaches you get. For tension headaches, self-care will usually help. To treat migraines, ask your healthcare provider for advice. It is also possible to get both tension and migraine headaches. Self-care involves relieving the pain and avoiding headache  triggers  if you can.     Ways to reduce pain and tension  Try these steps:    Apply a cold compress or ice pack to the pain site.    Drink fluids. If nausea makes it hard to drink, try sucking on ice.    Rest. Protect yourself from bright light and loud noises.    Calm your emotions by imagining a peaceful scene.    Massage tight neck, shoulder, and head muscles.    To relax muscles, soak in a hot bath or use a hot shower.  Use medicines  Aspirin or aspirin substitutes, such as ibuprofen and acetaminophen, can relieve headache. Remember: Never give aspirin to anyone 18 years old or younger because of the risk of developing Reye syndrome. Use pain medicines only when necessary.  Track your headaches  Keeping a headache diary can help you and your healthcare provider identify what's causing your headaches:    Note when each headache happens.    Identify your activities and the foods you've eaten 6 to 8 hours before the headache began.    Look for any trends or \"triggers.\"  Signs of tension headache  Any of the following can be signs:    Dull pain or feeling of pressure in a tight band around your head    Pain in your neck or shoulders    Headache without a definite beginning or end    Headache after an activity such as driving or working on a computer  Signs of migraine  Any of the following can be signs:    Throbbing pain on one or both sides of your head    Nausea or vomiting    Extreme sensitivity to light, sound, and smells    Bright spots, flashes, or other visual changes    Pain or nausea so severe that you can't continue your daily activities  Call your healthcare " "provider    If you have any of the following symptoms, contact your healthcare provider:    A headache that lingers after a recent injury or bump to the head.    A fever with a stiff neck or pain when you bend your head toward your chest.    A headache along with slurred speech, changes in your vision, or numbness or weakness in your arms or legs.    A headache for longer than 3 days.    Frequent headaches, especially in the morning.    Headaches with seizures     Seek immediate medical attention if you have a headache that you would call \"the worst headache you have ever had.\"         High Cholesterol: Assessing Your Risk     Have you been told that your cholesterol is too high? If so, you could be heading for a heart attack, also known as acute myocardial infarction (AMI), or stroke. This is especially true if you have other risk factors for heart disease. Get smart about cholesterol and your heart disease risk. This sheet can help you understand your heart disease risk and how your cholesterol level affects it. Talk to your healthcare provider about how to get started controlling your cholesterol.  Why is high cholesterol a problem?  Blood cholesterol is a fatty substance. The body uses it to make membranes in cells and for hormone production. It travels through the bloodstream and is used by the tissues for normal function. When blood cholesterol is high, it forms plaque and causes inflammation. The plaque builds up in the walls of arteries (blood vessels that carry blood from the heart to the body). This narrows the opening for blood flow. Over time, the heart may not get enough oxygen. This can lead to coronary artery disease, heart attack, or stroke.  3 steps to assessing your risk  Step 1. Find your risk factors for heart disease and stroke  How your cholesterol numbers affect your heart health depends on other risk factors for heart attack and stroke. Check off each risk factor below that applies to " you:    Are you a man 45 years old or older or a woman 55 years old or older?    Does your family have a history of heart problems before the age of 55 in male relatives or age 65 in female relatives? This includes heart attack, coronary heart disease, or atherosclerosis.    Do you have high blood pressure? Do you take medicine to treat high blood pressure?    Do you smoke?    Do you have diabetes?    Do you exercise very little or not very often? Recommendations are for 30 minutes of exercise at least 5 days a week. If you are not doing cardiovascular exercise as often as these recommendations, it may not be enough and you may be at higher risk for elevated cholesterol and heart disease.    Do you eat a diet that is high in saturated or trans fats, cholesterol, sugar, or alcohol? You may be at increased risk for heart disease if you do not eat enough fruits, vegetables, lean meats and eat sugars or drink alcohol sparingly.    Have you been told you have high cholesterol? Do you take medicine to control your cholesterol?   Step 2. Test your cholesterol  Have your cholesterol tested every 5 years after the age of 20 and more often if you have risk factors. Cholesterol testing most often needs no preparation. Sometimes you may be asked to fast (not eat) before your test. A blood sample is taken and sent to a lab. There, the amount of cholesterol and triglyceride in your blood is measured. There are 2 types of cholesterol in the sample. The first is HDL ( good cholesterol ). The second is LDL ( bad cholesterol ). Cholesterol test results are most often shown as the total of HDL and LDL cholesterol numbers. You may also be told the separate HDL and LDL cholesterol results.      Step 3. Discuss the results with your healthcare provider   If your cholesterol levels are higher than normal, your healthcare provider will help you with steps to take to lower your levels. Steps may include lifestyle changes like diet, physical  activity, and quitting smoking, and medicine to lower bad cholesterol levels.  If you have high cholesterol, you may need your cholesterol level tested more often to make sure your medicine and lifestyle changes are working to reduce your risks of having a heart attack or stroke.  Lifestyle Changes to Control Cholesterol  You can control your cholesterol through diet, exercise, weight management, quitting smoking, stress management, and taking your medicines right. These things can also lower your risk for cardiovascular disease.  Eating healthy  Your healthcare provider will give you information on diet changes you may need to make. Your provider may recommend that you see a registered dietitian for help with diet changes. Changes may include:    Cutting back on the amount of fat and cholesterol in your meals    Eating less salt (sodium). This is especially important if you have high blood pressure.    Eating more fresh vegetables and fruits    Eating lean proteins such as fish, poultry, beans, and peas    Eating less red meat and processed meats    Using low-fat dairy products    Using vegetable and nut oils in limited amounts    Limiting how many sweets and processed foods like chips, cookies, and baked goods that you eat     Limiting how many sugar-sweetened beverages you drink    Limiting how often you eat out  Getting exercise  Regular exercise is a good way to help your body control cholesterol. Regular exercise can help in many ways. It can:    Raise your good cholesterol    Help lower your bad cholesterol    Let blood flow better through your body    Give more oxygen to your muscles and tissues    Help you manage your weight    Help your heart pump better    Lower your blood pressure  Your healthcare provider may recommend that you get more physical activity if you haven't been active. Your provider may recommend that you get moderate to vigorous physical activity for at least 40 minutes each day. You  should do this for at least 3 to 4 days each week. A few examples of moderate to vigorous activity are:    Walking at a brisk pace. This is about 3 to 4 miles per hour.    Jogging or running    Swimming or water aerobics    Hiking    Dancing    Martial arts    Tennis    Riding a bicycle or stationary bike    Dancing  Managing your weight  If you are overweight or obese, your healthcare provider will work with you to help you lose weight and lower your BMI (body mass index). Making diet changes and getting more physical activity can help. Changing your diet will help you lose weight more easily than adding exercise.  Quitting smoking  Smoking and other tobacco use can raise cholesterol and make it harder to control. Quitting is tough. But millions of people have given up tobacco for good. You can quit, too! Think about some of the reasons below to quit smoking. Do any of them make you think twice about your smoking habit?  Stop smoking because it:    Keeps your cholesterol high, even if you make all the other changes you re supposed to    Damages your body. It especially harms your heart, lungs, skin, and blood vessels.    Makes you more likely to have a heart attack (acute myocardial infarction), stroke, or cancer    Stains your teeth    Makes your skin, clothes, and breath smell bad    Costs a lot of money  Controlling stress   Learn ways to control stress. This will help you deal with stress in your home and work life. Controlling stress can greatly lower your risk of getting cardiovascular disease.  Making the most of medicines  Healthy eating and exercise are a good start to keeping your cholesterol down. But you may need some extra help from medicine. If your doctor prescribes medicine, be sure to take it exactly as directed. Remember:    Tell your healthcare provider about all other medicines you take. This includes vitamins and herbs.    Tell your healthcare provider if you have any side effects after  starting to take a medicine. Examples of side effects to watch for include muscle aches, weakness, blurred vision, rust-colored urine, yellowing of eyes or skin (jaundice), and headache.    Don t skip a dose or stop taking your medicine because you feel better or because your cholesterol numbers go down. Never stop taking your medicine unless your healthcare provider has told you it s OK.    Ask your healthcare provider if you have any questions about your medicines.  High risk groups  Some people may need to take medicines called statins to control their cholesterol. This is in addition to eating a healthy diet and getting regular exercise.  Statins can help you stay healthy. They can also help prevent a heart attack or stroke. You may need to take a statin if you are in one of these groups:    Adults who have had a heart attack or stroke. Or adults who have had peripheral vascular disease, a ministroke (transient ischemic attack), or stable or unstable angina. This group also includes people who have had a procedure to restore blood flow through a blocked artery. These procedures include percutaneous coronary intervention, angioplasty, stent, and open-heart bypass surgery.    Adults who have diabetes. Or adults who are at higher risk of having a heart attack or stroke and have an LDL cholesterol level of 70 to 189 mg/dL    Adults who are 21 years old or older and have an LDL cholesterol level of 190 mg/dL or higher.  If you are in a high-risk group, talk with your healthcare provider about your treatment goals. Make sure you understand why these goals are important, based on your own health history and your family history of heart disease or high cholesterol.  Make a plan to have regular cholesterol checks. You may need to fast before getting this test. Also ask your provider about any side effects your medicines may cause. Let your provider know about any side effects you have. You may need to take more than one  medicine to reach the cholesterol goals that you and your provider decide on.    2214-8210 [employer]. [address] [place] [address], [place] [address]. All rights reserved. This information is not intended as a substitute for professional medical care. Always follow your healthcare professional's instructions.      Depression  Everyone feels down at times. The blues are a natural part of life. But an unhappy period that s intense or lasts for more than a couple of weeks can be a sign of depression. Depression is a serious illness. It can disrupt the lives of family and friends. If you know someone you think may be depressed, find out what you can do to help.     Know the serious signals  Warning signals for suicide include:    Threats or talk of suicide    Statements such as  I won t be a problem much longer  or  Nothing matters     Giving away possessions or making a will or  arrangements    Buying a gun or other weapon    Sudden, unexplained cheerfulness or calm after a period of depression  If you notice any of these signs, get help right away. Call a health care professional, mental health clinic, or suicide hotline and ask what action to take. In an emergency, don t hesitate to call the police.     [facility] Mental Health Crisis Lines:  [facility] [phone]  [facility] [phone]  [facility] [phone]  [facility] [phone]  [facility], Adults [phone]  [facility], Children [phone]  [facility], Adults [phone]  [facility], Children [phone]      All [facility] clinic patients have access to a Nurse 24 hours a day, 7 days a week.  If you have questions or want advice from a Nurse, please know [facility] is here for you.  You can call your clinic and they will connect you or you can call Care Connection at [phone].  [facility] also has Walk In Care clinics in multiple locations.  Call the number listed above for more  information about our Walk In Care clinics or visit the [employer] website at [email or web address].

## 2021-06-01 NOTE — PROGRESS NOTES
Patient has accomplished goals and has no other goals that this patient would like to work with Clinic Care Coordination/Health Care Home. Care Guide sent request to [facility] RN pool to review for Maintenance and update emergency plan.

## 2021-06-01 NOTE — TELEPHONE ENCOUNTER
RN cannot approve Refill Request    RN can NOT refill this medication med is not covered by policy/route to provider. Last office visit: 5/9/2019 Mara Murillo MD Last Physical: Visit date not found Last MTM visit: Visit date not found Last visit same specialty: 8/21/2019 Phuc Ahn MD.  Next visit within 3 mo: Visit date not found  Next physical within 3 mo: Visit date not found      Snow Hernández, [facility] Connection Triage/Med Refill 9/13/2019    Requested Prescriptions   Pending Prescriptions Disp Refills     cholecalciferol, vitamin D3, 2,000 unit Tab [Pharmacy Med Name: VITAMIN D3 2,000IU TABLETS] 90 tablet 0     Sig: TAKE 1 TABLET BY MOUTH DAILY       There is no refill protocol information for this order

## 2021-06-02 VITALS — WEIGHT: 101.3 LBS | BODY MASS INDEX: 16.35 KG/M2

## 2021-06-02 VITALS — WEIGHT: 101 LBS | BODY MASS INDEX: 16.81 KG/M2

## 2021-06-02 VITALS — WEIGHT: 100.7 LBS | BODY MASS INDEX: 16.76 KG/M2

## 2021-06-02 VITALS — HEIGHT: 66 IN | BODY MASS INDEX: 14.79 KG/M2 | WEIGHT: 92 LBS

## 2021-06-02 NOTE — TELEPHONE ENCOUNTER
Controlled Substance Refill Request  Medication:   Requested Prescriptions     Pending Prescriptions Disp Refills     acetaminophen-codeine (TYLENOL #3) 300-30 mg per tablet [Pharmacy Med Name: ACETAMINOPHEN/COD #3 (300/30MG) TAB] 120 tablet 0     Sig: TAKE 1 TABLET BY MOUTH EVERY 6 HOURS AS NEEDED FOR PAIN     Date Last Fill: 9.12.19  Pharmacy: [facility]   Submit electronically to pharmacy  Controlled Substance Agreement on File:   Encounter-Level CSA Scan Date - 09/08/2016:    Scan on 9/9/2016 11:19 AM       Last office visit: Last office visit pertaining to requested medication was 5.9.19.

## 2021-06-02 NOTE — TELEPHONE ENCOUNTER
Refill request for medication: 9/12/2019  Last visit addressing this medication: 5/9/2019  Follow up plan 6  months  Last refill on 9/12/2019, quantity #90   CSA completed 7/9/2018   checked  10/08/19, last dispensed refill 9/12/2019    Appointment: Not due     Radha Topete LPN

## 2021-06-03 VITALS
WEIGHT: 138.6 LBS | OXYGEN SATURATION: 95 % | BODY MASS INDEX: 22.37 KG/M2 | HEART RATE: 86 BPM | SYSTOLIC BLOOD PRESSURE: 104 MMHG | DIASTOLIC BLOOD PRESSURE: 62 MMHG | TEMPERATURE: 98 F

## 2021-06-03 VITALS — BODY MASS INDEX: 21.85 KG/M2 | WEIGHT: 135.38 LBS

## 2021-06-03 VITALS — WEIGHT: 124 LBS | BODY MASS INDEX: 20.01 KG/M2

## 2021-06-03 NOTE — TELEPHONE ENCOUNTER
Who is calling:  Patient   Reason for Call:  She is calling again to check the status of this refill request. She has made an appointment for 1/06/2019 and asks if this refill could be sent in to get her to that appointment.  Date of last appointment with primary care: 5/09/2019  Okay to leave a detailed message: No

## 2021-06-03 NOTE — TELEPHONE ENCOUNTER
Controlled Substance Refill Request  Medication:   Requested Prescriptions     Pending Prescriptions Disp Refills     acetaminophen-codeine (TYLENOL #3) 300-30 mg per tablet [Pharmacy Med Name: ACETAMINOPHEN/COD #3 (300/30MG) TAB] 120 tablet 0     Sig: TAKE 1 TABLET BY MOUTH EVERY 6 HOURS AS NEEDED FOR PAIN     Date Last Fill: 10/8/19  Pharmacy: [id number]   Submit electronically to pharmacy  Controlled Substance Agreement on File:   Encounter-Level CSA Scan Date - 09/08/2016:    Scan on 9/9/2016 11:19 AM       Last office visit: Last office visit pertaining to requested medication was 9/24/19.

## 2021-06-03 NOTE — TELEPHONE ENCOUNTER
Refill request for medication: acetaminophen-codeine (TYLENOL #3) 300-30 mg per tablet  Last visit addressing this medication: 05/09/2019  Follow up plan 6  months  Last refill on 10/8/2019, quantity 120   CSA completed 07/09/2018   checked  11/04/19, last dispensed refill 10/09/2019    Appointment: No appointments scheduled at this time.     Nisa Rangel, CMA

## 2021-06-03 NOTE — TELEPHONE ENCOUNTER
Refill request for medication: tylenol # 3  Last visit addressing this medication: 05/19/19  Follow up plan 6  months  Last refill on 11/04/19, quantity 120   CSA completed none on file   checked  12/02/19, last dispensed refill unable to check    Appointment: Left message for patient     Onelia Neri, CMA

## 2021-06-03 NOTE — PROGRESS NOTES
My Clinic Care Coordination Wellness Plan  This Graduation Wellness Plan provides private information in regard to the work I have done with my Care Team at my Primary Care Clinic.  This document provides insight on the goals I have accomplished.  My Care Team congratulates me on my journey to maintain wellness.  This document will help guide me on my journey to maintain a healthy lifestyle.  I will use this to help me overcome any barriers I may encounter.  If I should have any questions or concerns, I will contact the members of my Care Team or contact my Primary Care Clinic for assistance.     [address]  [address]  [phone]    My Preferred Method of Contact:  Phone: [phone]    My Primary/Preferred Language:  English    Preferred Learning Style:  Reading information, Face to face discussion, Pictures/Diagrams and Hands on teaching    Emergency Contact: Extended Emergency Contact Information  Primary Emergency Contact: Alissa Del Real   [facility]  Home Phone: [phone]  Mobile Phone: [phone]  Relation: Child  Secondary Emergency Contact: Cesia Lu   [facility]  Home Phone: [phone]  Mobile Phone: [phone]  Relation: Sibling     PCP:  Mara Murillo MD  Specialists:    Care Team            Mara Murillo MD   [phone] PCP - General, Family Medicine    Raj King MD   [phone] Physician, Neurology    [facility]; Follow Up PRN    [employer]   [phone]     Alissa Del Real, Daughter   [phone]     Emergency Contact; Consent to Communicate signed 5/10/18    Deirdre Reyes, CNP   [phone] Assigned PCP        Accomplishments:  Goals        Patient Stated      COMPLETED: I have accomplished experiencing less depression and increased motivation. (pt-stated)      Personal Plan  I have less depression, more motivation and I am feeling a lot better. If things start to get  worse I will talk with Dr. Murillo.           COMPLETED: I have accomplished getting needed money to get my dental work completed. (pt-stated)      Personal Plan  I have taken the money out of my 401K and will make an appointment to see my surgeon soon. I am not in need of assistance any longer with this goal. If I have more questions about completing dental work I will call my dentists office.        COMPLETED: I was able to learn what Medicare Part D options were available to me and chose one. (pt-stated)      Personal Plan:  I will make sure I know the coverage details for my new Medicare Part D plan (such as deductibles, co-pays and services covered).  I will provide my new insurance card to my providers so my chart is updated with my most current insurance information.  I will make sure to renew or choose a new plan during the annual renewal time in 2019.          COMPLETED: My care has been well coordinated. (pt-stated)      Personal Plan:  I will continue to stay in contact with my Care Team and follow up in the recommended time frames.              COMPLETED: My energy has increased and is stable. (pt-stated)      Personal Plan:  I will continue to drink 1 Boost every day.  I will continue to eat something from the kitchen each time I go in there.  I will continue to receive meals from [employer].  I will continue to make sure I get sleep each night.              Advanced Directive/Living Will: On file with the clinic    Clinical Emergency Plan    Self-Care for Headaches (.HCHEPheadaches)    Most headaches aren't serious and can be relieved with self-care. But some headaches may be a sign of another health problem like eye trouble or high blood pressure. To find the best treatment, learn what kind of headaches you get. For tension headaches, self-care will usually help. To treat migraines, ask your healthcare provider for advice. It is also possible to get both tension and migraine headaches. Self-care  "involves relieving the pain and avoiding headache  triggers  if you can.     Ways to reduce pain and tension  Try these steps:    Apply a cold compress or ice pack to the pain site.    Drink fluids. If nausea makes it hard to drink, try sucking on ice.    Rest. Protect yourself from bright light and loud noises.    Calm your emotions by imagining a peaceful scene.    Massage tight neck, shoulder, and head muscles.    To relax muscles, soak in a hot bath or use a hot shower.  Use medicines  Aspirin or aspirin substitutes, such as ibuprofen and acetaminophen, can relieve headache. Remember: Never give aspirin to anyone 18 years old or younger because of the risk of developing Reye syndrome. Use pain medicines only when necessary.  Track your headaches  Keeping a headache diary can help you and your healthcare provider identify what's causing your headaches:    Note when each headache happens.    Identify your activities and the foods you've eaten 6 to 8 hours before the headache began.    Look for any trends or \"triggers.\"  Signs of tension headache  Any of the following can be signs:    Dull pain or feeling of pressure in a tight band around your head    Pain in your neck or shoulders    Headache without a definite beginning or end    Headache after an activity such as driving or working on a computer  Signs of migraine  Any of the following can be signs:    Throbbing pain on one or both sides of your head    Nausea or vomiting    Extreme sensitivity to light, sound, and smells    Bright spots, flashes, or other visual changes    Pain or nausea so severe that you can't continue your daily activities  Call your healthcare provider    If you have any of the following symptoms, contact your healthcare provider:    A headache that lingers after a recent injury or bump to the head.    A fever with a stiff neck or pain when you bend your head toward your chest.    A headache along with slurred speech, changes in your " "vision, or numbness or weakness in your arms or legs.    A headache for longer than 3 days.    Frequent headaches, especially in the morning.    Headaches with seizures     Seek immediate medical attention if you have a headache that you would call \"the worst headache you have ever had.\"              Depression  Everyone feels down at times. The blues are a natural part of life. But an unhappy period that s intense or lasts for more than a couple of weeks can be a sign of depression. Depression is a serious illness. It can disrupt the lives of family and friends. If you know someone you think may be depressed, find out what you can do to help.     Know the serious signals  Warning signals for suicide include:    Threats or talk of suicide    Statements such as  I won t be a problem much longer  or  Nothing matters     Giving away possessions or making a will or  arrangements    Buying a gun or other weapon    Sudden, unexplained cheerfulness or calm after a period of depression  If you notice any of these signs, get help right away. Call a health care professional, mental health clinic, or suicide hotline and ask what action to take. In an emergency, don t hesitate to call the police.     [facility] Mental Health Crisis Lines:  [facility] [phone]  [facility] [phone]  [facility] [phone]  [facility] [phone]  [facility], Adults [phone]  [facility], Children [phone]  [facility], Adults [phone]  [facility], Children [phone]     Handwashing: Tips for Patients, Family, and Friends  Germs are everywhere around us. Normally, we live with germs without getting sick. In certain cases, harmful germs cause us to get sick with an infection. Or we can spread harmful germs to others and cause them to get sick. Keeping your hands clean is the best way to prevent getting or spreading germs that cause infection. Wash your hands with soap and water or " use an alcohol-based hand .  When to clean your hands: For patients  In the hospital or in your home, you can come in contact with many harmful germs. To help prevent infection, wash your hands often, especially:    After using the bathroom    Before and after eating    After coughing or sneezing    After using a tissue    After touching or changing a dressing or bandage    After touching any object or surface that may be contaminated    After touching an animal during a pet therapy session (hospital)    After touching an animal, cleaning up after a pet, or preparing food for pets (home)  If you don t have access to soap and water, use an alcohol-based hand gel containing at least 60% alcohol. These products kill most germs and are easy to use. But use soap and water (not alcohol-based hand gel) if your hands are visibly dirty.  When to clean your hands: For family and friends  When visiting or caring for a loved one, washing your hands or using an alcohol-based hand  can help stop germs from spreading. Wash your hands:    Before entering and after leaving the patient s room    As soon as you remove gloves or other protective clothing    After changing a dressing or bandage    After any contact with blood or other body fluids    After touching or changing the patient s bed linen or towels    After touching an animal during a pet therapy session (hospital)    After touching an animal, cleaning up after a pet, or preparing food for pets (home)  Many hospitals have sinks or gel dispensers right outside patient rooms. If not, carry a bottle of alcohol-based hand gel with you, and use it every time you visit. Use soap and water (not alcohol-based hand gel) if your hands are visibly dirty.  Tips for good handwashing  Here are some suggestions to follow:    Use warm water and plenty of soap. Work up a good lather.    Clean the whole hand, including under your nails, between your fingers, and up the  wrists.    Wash for at least 15 to 20 seconds. Don t just wipe. Scrub well.    Rinse, letting the water run down your fingers, not up your wrists.    Dry your hands well. Use a paper towel to turn off the faucet and open the door.  Time matters  The longer you wash your hands, the more germs you ll remove. Most people wash their hands for 6 to 7 seconds. But at least 15 seconds are needed to remove germs. Singing Happy Birthday or the Alphabet Song are examples of how long 15 seconds would be. To protect yourself and others from infection, washing for 30 seconds is best.  How to use an alcohol-based hand   Alcohol-based hand  may kill more germs than soap and water. Use them when your hands aren t visibly dirty. For best results, follow these steps:    Choose a gel or spray that contains at least 60 percent alcohol. Products with less alcohol may not kill germs.    Spread about a tablespoon of  in the palm of one hand.    Rub your hands together briskly, cleaning the backs of your hands, the palms, between your fingers, and up the wrists.    Rub until the  is gone, and your hands are completely dry.      All [facility] clinic patients have access to a Nurse 24 hours a day, 7 days a week.  If you have questions or want advice from a Nurse, please know [facility] is here for you.  You can call your clinic and they will connect you or you can call Care Connection at [phone].  [facility] also has Walk In Care clinics in multiple locations.  Call the number listed above for more information about our Walk In Care clinics or visit the [facility] website at [email or web address].

## 2021-06-03 NOTE — TELEPHONE ENCOUNTER
Reached out to patient to patient and informed her the requested refill has been processed. Thank you, Nisa

## 2021-06-03 NOTE — TELEPHONE ENCOUNTER
Controlled Substance Refill Request  Medication:   Requested Prescriptions     Pending Prescriptions Disp Refills     acetaminophen-codeine (TYLENOL #3) 300-30 mg per tablet [Pharmacy Med Name: ACETAMINOPHEN/COD #3 (300/30MG) TAB] 120 tablet 0     Sig: TAKE 1 TABLET BY MOUTH EVERY 6 HOURS AS NEEDED FOR PAIN     Date Last Fill: 11/4/19 #120 tablets  Pharmacy: [place] [id number]   Submit electronically to pharmacy  Controlled Substance Agreement on File:   Encounter-Level CSA Scan Date - 09/08/2016:    Scan on 9/9/2016 11:19 AM       Last office visit: 5/9/2019 Mara Murillo MD Jill Thielen, RN  Triage Nurse Advisor

## 2021-06-03 NOTE — PROGRESS NOTES
Patient has been on [facility] Maintenance since 9/26/19 and has no other goals that this patient would like to work with Clinic Care Coordination/Health Care Home. Care Guide sent request to [facility] RN pool to review for Graduation and update emergency plan.

## 2021-06-03 NOTE — PROGRESS NOTES
"Clinic Care Coordination Contact    Assessment: Care Coordinator CHW contacted patient for 2 month follow up.  Patient has continued to follow the plan of care and assessment is negative for any new needs or concerns.    Enrollment status: Graduated.      Plan: No further outreaches at this time.  Patient will continue to follow the plan of care.  If new needs arise a new Care Coordination referral may be placed.  FYI to PCP      Self-Care for Headaches (.HCHEPheadaches)  Most headaches aren't serious and can be relieved with self-care. But some headaches may be a sign of another health problem like eye trouble or high blood pressure. To find the best treatment, learn what kind of headaches you get. For tension headaches, self-care will usually help. To treat migraines, ask your healthcare provider for advice. It is also possible to get both tension and migraine headaches. Self-care involves relieving the pain and avoiding headache  triggers  if you can.    Ways to reduce pain and tension  Try these steps:    Apply a cold compress or ice pack to the pain site.    Drink fluids. If nausea makes it hard to drink, try sucking on ice.    Rest. Protect yourself from bright light and loud noises.    Calm your emotions by imagining a peaceful scene.    Massage tight neck, shoulder, and head muscles.    To relax muscles, soak in a hot bath or use a hot shower.  Use medicines  Aspirin or aspirin substitutes, such as ibuprofen and acetaminophen, can relieve headache. Remember: Never give aspirin to anyone 18 years old or younger because of the risk of developing Reye syndrome. Use pain medicines only when necessary.  Track your headaches  Keeping a headache diary can help you and your healthcare provider identify what's causing your headaches:    Note when each headache happens.    Identify your activities and the foods you've eaten 6 to 8 hours before the headache began.    Look for any trends or \"triggers.\"  Signs of tension " "headache  Any of the following can be signs:    Dull pain or feeling of pressure in a tight band around your head    Pain in your neck or shoulders    Headache without a definite beginning or end    Headache after an activity such as driving or working on a computer  Signs of migraine  Any of the following can be signs:    Throbbing pain on one or both sides of your head    Nausea or vomiting    Extreme sensitivity to light, sound, and smells    Bright spots, flashes, or other visual changes    Pain or nausea so severe that you can't continue your daily activities  Call your healthcare provider    If you have any of the following symptoms, contact your healthcare provider:    A headache that lingers after a recent injury or bump to the head.    A fever with a stiff neck or pain when you bend your head toward your chest.    A headache along with slurred speech, changes in your vision, or numbness or weakness in your arms or legs.    A headache for longer than 3 days.    Frequent headaches, especially in the morning.    Headaches with seizures     Seek immediate medical attention if you have a headache that you would call \"the worst headache you have ever had.\"           Depression  Everyone feels down at times. The blues are a natural part of life. But an unhappy period that s intense or lasts for more than a couple of weeks can be a sign of depression. Depression is a serious illness. It can disrupt the lives of family and friends. If you know someone you think may be depressed, find out what you can do to help.    Know the serious signals  Warning signals for suicide include:    Threats or talk of suicide    Statements such as  I won t be a problem much longer  or  Nothing matters     Giving away possessions or making a will or  arrangements    Buying a gun or other weapon    Sudden, unexplained cheerfulness or calm after a period of depression  If you notice any of these signs, get help right away. Call a " health care professional, mental health clinic, or suicide hotline and ask what action to take. In an emergency, don t hesitate to call the police.    [facility] Mental Health Crisis Lines:  [facility] [phone]  [facility] [phone]  [facility] [phone]  [facility] [phone]  [facility], Adults [phone]  [facility], Children [phone]  [facility], Adults [phone]  [facility], Children [phone]    Handwashing: Tips for Patients, Family, and Friends  Germs are everywhere around us. Normally, we live with germs without getting sick. In certain cases, harmful germs cause us to get sick with an infection. Or we can spread harmful germs to others and cause them to get sick. Keeping your hands clean is the best way to prevent getting or spreading germs that cause infection. Wash your hands with soap and water or use an alcohol-based hand .  When to clean your hands: For patients  In the hospital or in your home, you can come in contact with many harmful germs. To help prevent infection, wash your hands often, especially:  After using the bathroom  Before and after eating  After coughing or sneezing  After using a tissue  After touching or changing a dressing or bandage  After touching any object or surface that may be contaminated  After touching an animal during a pet therapy session (hospital)  After touching an animal, cleaning up after a pet, or preparing food for pets (home)  If you don t have access to soap and water, use an alcohol-based hand gel containing at least 60% alcohol. These products kill most germs and are easy to use. But use soap and water (not alcohol-based hand gel) if your hands are visibly dirty.  When to clean your hands: For family and friends  When visiting or caring for a loved one, washing your hands or using an alcohol-based hand  can help stop germs from spreading. Wash your hands:  Before entering and after  leaving the patient s room  As soon as you remove gloves or other protective clothing  After changing a dressing or bandage  After any contact with blood or other body fluids  After touching or changing the patient s bed linen or towels  After touching an animal during a pet therapy session (hospital)  After touching an animal, cleaning up after a pet, or preparing food for pets (home)  Many hospitals have sinks or gel dispensers right outside patient rooms. If not, carry a bottle of alcohol-based hand gel with you, and use it every time you visit. Use soap and water (not alcohol-based hand gel) if your hands are visibly dirty.  Tips for good handwashing  Here are some suggestions to follow:  Use warm water and plenty of soap. Work up a good lather.  Clean the whole hand, including under your nails, between your fingers, and up the wrists.  Wash for at least 15 to 20 seconds. Don t just wipe. Scrub well.  Rinse, letting the water run down your fingers, not up your wrists.  Dry your hands well. Use a paper towel to turn off the faucet and open the door.  Time matters  The longer you wash your hands, the more germs you ll remove. Most people wash their hands for 6 to 7 seconds. But at least 15 seconds are needed to remove germs. Singing Happy Birthday or the Alphabet Song are examples of how long 15 seconds would be. To protect yourself and others from infection, washing for 30 seconds is best.  How to use an alcohol-based hand   Alcohol-based hand  may kill more germs than soap and water. Use them when your hands aren t visibly dirty. For best results, follow these steps:  Choose a gel or spray that contains at least 60 percent alcohol. Products with less alcohol may not kill germs.  Spread about a tablespoon of  in the palm of one hand.  Rub your hands together briskly, cleaning the backs of your hands, the palms, between your fingers, and up the wrists.  Rub until the  is gone, and your  hands are completely dry.

## 2021-06-03 NOTE — TELEPHONE ENCOUNTER
Patient Returning Call  Reason for call:  Return call.  Information relayed to patient:  The message below from [place] is not clear what is needed. Patient was informed per last office visit on 5/9/19 with Mara Murillo MD, to make a 6 month follow up.  Patient has additional questions:  No  If YES, what are your questions/concerns:  n/a  Okay to leave a detailed message?: No     Patient was transferred to scheduling to make an appointment.

## 2021-06-04 VITALS
WEIGHT: 114.2 LBS | BODY MASS INDEX: 18.35 KG/M2 | HEIGHT: 66 IN | HEIGHT: 66 IN | WEIGHT: 114.2 LBS | BODY MASS INDEX: 18.35 KG/M2 | BODY MASS INDEX: 18.35 KG/M2 | HEIGHT: 66 IN | WEIGHT: 114.2 LBS

## 2021-06-04 VITALS — WEIGHT: 130 LBS | BODY MASS INDEX: 20.98 KG/M2

## 2021-06-04 VITALS
WEIGHT: 132 LBS | HEART RATE: 80 BPM | DIASTOLIC BLOOD PRESSURE: 72 MMHG | BODY MASS INDEX: 21.21 KG/M2 | HEIGHT: 66 IN | SYSTOLIC BLOOD PRESSURE: 110 MMHG

## 2021-06-04 VITALS
HEART RATE: 117 BPM | DIASTOLIC BLOOD PRESSURE: 54 MMHG | BODY MASS INDEX: 17.75 KG/M2 | OXYGEN SATURATION: 96 % | SYSTOLIC BLOOD PRESSURE: 84 MMHG | WEIGHT: 110 LBS

## 2021-06-04 NOTE — TELEPHONE ENCOUNTER
1. Refill request for medication: acetaminophen-codeine (TYLENOL #3) 300-30 mg per tablet  Last visit addressing this medication: 05/09/2019  Follow up plan 6  months  Last refill on 12/2/2019, quantity 120   CSA completed 07/09/2018   checked  12/31/19, last dispensed refill 12/02/2019    Appointment: Appointment scheduled for 01/06/2020     Nisa Rangel CMA       Refill request for medication: OXcarbazepine (TRILEPTAL) 150 MG tablet  Last visit addressing this medication: Unknown  Follow up plan Unknown  months  Last refill on 10/19/2018, quantity 270       Appointment: Appointment scheduled for 01/06/2020     Nisa Rangel CMA

## 2021-06-04 NOTE — TELEPHONE ENCOUNTER
Patient states she is out medication and her pharmacy closes today at 5 pm requesting to send the refill prescription before 5 pm as soon as possible.  Refill Request  Did you contact pharmacy: No  Medication name:   Requested Prescriptions     Pending Prescriptions Disp Refills     acetaminophen-codeine (TYLENOL #3) 300-30 mg per tablet [Pharmacy Med Name: ACETAMINOPHEN/COD #3 (300/30MG) TAB] 120 tablet 0     Sig: TAKE 1 TABLET BY MOUTH EVERY 6 HOURS AS NEEDED FOR PAIN     OXcarbazepine (TRILEPTAL) 150 MG tablet [Pharmacy Med Name: OXCARBAZEPINE 150MG TABLETS] 270 tablet 3     Sig: TAKE 3 TABLETS(450 MG) BY MOUTH AT BEDTIME   Who prescribed the medication: Mara Murillo MD  Pharmacy Name and Location: [facility] # [id number]  Is patient out of medication: Yes  Patient notified refills processed in 72 hours:  yes  Okay to leave a detailed message: no

## 2021-06-05 ENCOUNTER — RECORDS - HEALTHEAST (OUTPATIENT)
Dept: PULMONOLOGY | Facility: OTHER | Age: 76
End: 2021-06-05

## 2021-06-05 DIAGNOSIS — R22.2 SWELLING, MASS, OR LUMP IN CHEST: ICD-10-CM

## 2021-06-05 NOTE — PROGRESS NOTES
Assessment   Julisa Chaney is a 74 y.o. female who is establish patient to my practice here with   Chief Complaint   Patient presents with     Follow-up     facial pain, flu shot and meds - stopped Zoloft as it was making her feel bloated and was not helping; has been off for 1 week and feels better.        Julisa was seen today for follow-up.    Diagnoses and all orders for this visit:    Trigeminal neuralgia  Comments:  last flare during thanksgiving time , patient aware that as soon as the pain starts she should take her pain medication.  If needed recommend follow-up with the neurologist if flareups are more frequent    Intractable migraine without aura and without status migrainosus    Currently stable no change in medication needed  Screen for colon cancer  -     Cologuard    Anxiety  Discontinue Zoloft for now add Wellbutrin on daily basis to help overall anxiety  -     buPROPion (WELLBUTRIN XL) 150 MG 24 hr tablet; Take 1 tablet (150 mg total) by mouth every morning.    Senile osteoporosis  -     DXA Bone Density Scan; Future    Other orders  -     Influenza High Dose, Seasonal 65+ yrs      Vies to make appointment for Medicare physical in next 1 to 2 months.  Then we can go over her generalized anxiety and how the new medication working  Her weight is stable trying to eat more healthy and more frequent meals on daily basis  Subjective:      HPI: Julisa Chaney is a 74 y.o. female    with known history of depression anxiety, trigeminal neuralgia for which she takes chronic pain medication and antiepileptic,nortriptyline, new concern today that she doesn't like the side effects of zoloft which she is taking it for last 4-5 month , mostly complaining of bloating gas abdominal pain and also lot of body aches and pains and mental fog she stopped her medication for last 1 week and feel much better continue to have anxiety, but trying to reflect little bit on more positive than negative and trying to reach out  to people to connect with them on daily basis mostly friends and family and [employer]    Patient with known history of trigeminal neuralgia last flareup was in November but she is aware of how to take care of things so not to worry it we did already refill her Tylenol 3 which she need almost on quite frequent basis    Social History     Social History Narrative    Lives alone in the house, relay in TV dinner    grandkids help with house maintenance    Daughter lives close by.    Mara Murillo MD 7/9/2018 1:49 PM         Past Medical History:   Diagnosis Date     High cholesterol      Migraines      Past Surgical History:   Procedure Laterality Date     AR TOTAL ABDOM HYSTERECTOMY      Description: Total Abdominal Hysterectomy;  Recorded: 08/17/2010;     Chocolate; Mirtazapine; and Penicillins  Current Outpatient Medications   Medication Sig Dispense Refill     acetaminophen-codeine (TYLENOL #3) 300-30 mg per tablet TAKE 1 TABLET BY MOUTH EVERY 6 HOURS AS NEEDED FOR PAIN 120 tablet 0     aspirin-acetaminophen-caffeine (EXCEDRIN MIGRAINE) 250-250-65 mg per tablet Take 1 tablet by mouth every 6 (six) hours as needed for pain.       cholecalciferol, vitamin D3, 2,000 unit Tab Take 1 tablet (2,000 Units total) by mouth daily. One tab daily 90 tablet 4     DAILY-DAVID tablet TAKE 1 TABLET BY MOUTH DAILY 100 tablet 3     ferrous sulfate 325 (65 FE) MG tablet Take 1 tablet by mouth 2 (two) times a day.       nortriptyline (PAMELOR) 25 MG capsule 2 cap twice daily 360 capsule 4     omeprazole (PRILOSEC) 20 MG capsule Take 20 mg by mouth daily before breakfast.       OXcarbazepine (TRILEPTAL) 150 MG tablet TAKE 3 TABLETS(450 MG) BY MOUTH AT BEDTIME 270 tablet 3     polyvinyl alcohol (LIQUIFILM TEARS) 1.4 % ophthalmic solution Administer 2 drops to both eyes as needed for dry eyes. 15 mL 0     topiramate (TOPAMAX) 50 MG tablet Take 1 tablet (50 mg total) by mouth daily. 90 tablet 3     buPROPion (WELLBUTRIN XL) 150 MG 24 hr tablet  "Take 1 tablet (150 mg total) by mouth every morning. 30 tablet 2     No current facility-administered medications for this visit.      Family History   Problem Relation Age of Onset     Cancer Father      Testicular cancer Grandchild 17     Breast cancer Mother      Breast cancer Sister      Breast cancer Maternal Aunt      Breast cancer Sister        Patient Active Problem List   Diagnosis     Osteopenia     Hyperlipidemia     Migraine Headache     Trigeminal neuralgia     Counseling regarding advanced directives and goals of care     Controlled substance agreement signed     Hypercalcemia     Anxiety       Review of Systems    Pertinent ROS noted in HPI        Objective:     Vitals:    01/06/20 1143   BP: 110/72   Pulse: 80   Weight: 132 lb (59.9 kg)   Height: 5' 6\" (1.676 m)       Physical Exam:   Physical Exam:  General Appearance:  Appears comfortable, Alert, cooperative, no distress,   Head: Normocephalic, without obvious abnormality, atraumatic  Eyes: PERRL, conjunctiva/corneas clear, EOM's intact, both eyes             Nose: Nares normal, no drainage   Throat: Lips, mucosa, and tongue normal; teeth and gums normal  Neck: Supple, symmetrical, trachea midline, no adenopathy;                      Lungs: Clear to auscultation bilaterally, respirations unlabored  Heart: Regular rate and rhythm, S1 and S2 normal, no murmur, rubs or gallop  Abdomen: Soft, non-tender, bowel sounds active all four quadrants,   no masses, no organomegaly  Extremities: Extremities normal, atraumatic, no cyanosis or edema  Pulses: DP pulses are 1-2+ bilat.    Skin: no rashes or lesions  Neurologic: normal and equal strength bilat in upper and lower extremities     Mara Murillo MD    "

## 2021-06-05 NOTE — TELEPHONE ENCOUNTER
Last Refill: 12/31/19 for #120, 0 refills  Last OV: 1/6/20 val/Chidi for Trigeminal Neuralgia, Intractable migraine without aura and without status migrainosus, Anxiety, Senile osteoporosis.

## 2021-06-05 NOTE — TELEPHONE ENCOUNTER
Refill Approved    Rx renewed per Medication Renewal Policy. Medication was last renewed on 12/23/18.    Alyce Baker, [facility] Triage/Med Refill 1/27/2020     Requested Prescriptions   Pending Prescriptions Disp Refills     topiramate (TOPAMAX) 50 MG tablet [Pharmacy Med Name: TOPIRAMATE 50MG TABLETS] 90 tablet 3     Sig: TAKE 1 TABLET(50 MG) BY MOUTH DAILY       Topiramate Refill Protocol  Passed - 1/27/2020  9:41 AM        Passed - Bicarbonate/Electrolyte panel in last 12 months     Sodium   Date Value Ref Range Status   02/11/2019 140 136 - 145 mmol/L Final     Potassium   Date Value Ref Range Status   02/11/2019 3.8 3.5 - 5.0 mmol/L Final     Chloride   Date Value Ref Range Status   02/11/2019 105 98 - 107 mmol/L Final     CO2   Date Value Ref Range Status   02/11/2019 28 22 - 31 mmol/L Final                Passed - PCP or prescribing provider visit in last 12 months or next 3 months     Last office visit with prescriber/PCP: 1/6/2020 Mara Murillo MD OR same dept: 1/6/2020 Mara Murillo MD OR same specialty: 1/6/2020 Mara Murillo MD  Last physical: Visit date not found Last MTM visit: Visit date not found   Next visit within 3 mo: Visit date not found  Next physical within 3 mo: Visit date not found  Prescriber OR PCP: Mara Murillo MD  Last diagnosis associated with med order: 1. Intractable chronic migraine without aura and without status migrainosus  - topiramate (TOPAMAX) 50 MG tablet [Pharmacy Med Name: TOPIRAMATE 50MG TABLETS]; TAKE 1 TABLET(50 MG) BY MOUTH DAILY  Dispense: 90 tablet; Refill: 3    If protocol passes may refill for 12 months if within 3 months of last provider visit (or a total of 15 months).             Passed - Serum creatinine in last 12 months     Creatinine   Date Value Ref Range Status   02/11/2019 0.67 0.60 - 1.10 mg/dL Final

## 2021-06-06 NOTE — TELEPHONE ENCOUNTER
Patient Returning Call  Reason for call:  Patient called back.  Information relayed to patient:  n/a  Patient has additional questions:  Yes  If YES, what are your questions/concerns:  Calling on the status of this medication.  Please address today and call patient when med is sent to the pharmacy.  Okay to leave a detailed message?: Yes

## 2021-06-06 NOTE — TELEPHONE ENCOUNTER
Refill request for medication: acetaminophen-codeine (TYLENOL #3) 300-30 mg per tablet  Last visit addressing this medication: 01/06/2020  Follow up plan 1  months  Last refill on 01/27/2020, quantity 120   CSA completed 07/09/18   checked  02/24/20, last dispensed refill 01/27/2020    Appointment: No appointments scheduled at this time.     Nisa Rangel, CMA

## 2021-06-07 NOTE — TELEPHONE ENCOUNTER
Reached out to patient and informed her prescription has been submitted to the pharmacy. Nisa Rangel

## 2021-06-07 NOTE — TELEPHONE ENCOUNTER
Refill request for medication: Tylenol #3  Last visit addressing this medication: 01/06/20  Follow up plan 1  months  Last refill on 02/24/20, quantity #30   CSA completed last CSA 7-9-18   checked  03/20/20, last dispensed refill 02/24/20    Appointment: do you want to scheduled for a telephone visit for medicaiton check?     Onelia Neri, CMA

## 2021-06-07 NOTE — TELEPHONE ENCOUNTER
Patient Returning Call  Reason for call:  Patient called back.  Information relayed to patient:  n/a  Patient has additional questions:  Yes  If YES, what are your questions/concerns:  Calling on the status of this medication.  States needs it sent to the pharmacy today.  Please address.  Okay to leave a detailed message?: Yes

## 2021-06-07 NOTE — TELEPHONE ENCOUNTER
Refill request for medication: Tylenol #3  Last visit addressing this medication: 1/27/20  Follow up plan 1-2  Months for AWV- to discuss anxiety and new medication  Last refill on 3/20/20, quantity #120   CSA completed Not on file   checked  04/17/20, last dispensed refill 3/22/20    Appointment: Would you like to have patient follow up for this medication? Patient was advised to schedule in 1-2 months from January appointment  for a physical.      Miracle Samayoa LPN

## 2021-06-08 NOTE — PROGRESS NOTES
71-year-old female is a very poor historian comes in today for not feeling well.  She was found to have strep pharyngitis for which azithromycin will be sent to her pharmacy.  She also was found to have hypokalemia and I sent potassium replacement to her pharmacy.  More importantly, her anemia, weakness, hypoxia is concerning for malignancy.  Chest x-ray confirmed this suspicion.  I will have her see pulmonology.  Patient will follow up with her primary care provider in one week.  She verbalized understanding and agreed with the plan    ASSESSMENT/PLAN:  1. Weakness  - Comprehensive Metabolic Panel  - HM1(CBC and Differential)  - HM1 (CBC with Diff)    2. Mouth sores  - Rapid Strep A Screen-Throat    3. Cough  - XR Chest PA and Lateral; Future    4. Strep pharyngitis  - azithromycin (ZITHROMAX Z-ENEDELIA) 250 MG tablet; Take 2 tablets (500 mg) on  Day 1,  followed by 1 tablet (250 mg) once daily on Days 2 through 5.  Dispense: 6 tablet; Refill: 0    5. Hypokalemia  - potassium chloride (KLOR-CON) 10 MEQ CR tablet; Take 1 tablet (10 mEq total) by mouth daily.  Dispense: 30 tablet; Refill: 0    6.  SOB, weakness, abnormal CXR  CXR  Referral to pulmonology    Orders Placed This Encounter   Procedures     Rapid Strep A Screen-Throat     XR Chest PA and Lateral     Standing Status:   Future     Number of Occurrences:   1     Standing Expiration Date:   1/5/2018     Order Specific Question:   Can the procedure be changed per Radiologist protocol?     Answer:   Yes     Comprehensive Metabolic Panel     HM1 (CBC with Diff)           CHIEF COMPLAINT:  Chief Complaint   Patient presents with     Cough     1.5 week     Fatigue     bodyache     1.5 week       HISTORY OF PRESENT ILLNESS:  Julisa is a 71 y.o. female presenting to the clinic today.  She is a poor historian and multiple attempts were made on questioning her symptoms and her reason for being here.  She has nonspecific symptoms such as cough, weakness, headache, ear  pain, sore throat. The cough started about 1.5 weeks ago for her. The cough has been getting a little better. The cough has lessened in the past two days. She says that she has had some shortness of breath, but not currently. She has not had a fevers or chills. She has had some body aches, and she had a headache. She has had some intermittent ear pain in the past week. She describes this as a little pressure building up. She does have a sore throat today. She lives a lone, and lives close to her daughter. The symptoms that are bothering her most are feeling weak and generalized malaise. Her sore throat and mouth pain have been going on for a while. She saw Dr. Roth for this.    Looking through the chart patient has irregular follow-ups.  The patient has had 3 PET scan done from the time span of 2014 - 2015 with mention of hypermetabolic state throughout right lung base disease including pleural disease compatible with malignant etiology.  Patient has had referrals to pulmonology but it is unclear whether she followed through on these appointments.  Patient was not very specific in direct when questions were asked regarding this.  Her primary care provider has retired and she has had one established care visit with Dr. Roth.  States she has had a chronic cough which is slightly better.  States she does not have any shortness of breath.    Patient lives alone in a house in [place].  She states her daughter is close by.  Patient is independent with her activities of daily living except for driving.  States she takes all her medications as indicated however she is unable to identify the medications or recognize the name of her medications.    REVIEW OF SYSTEMS:   No nausea or vomiting, chest pain, stomach pain, diarrhea. She does have some mild constipation.   All other systems are negative.    PFSH:  No new history.     TOBACCO USE:  History   Smoking Status     Former Smoker     Packs/day: 1.00     Years: 50.00      Quit date: 11/16/2014   Smokeless Tobacco     Never Used     Comment: stop smoking packet given       VITALS:  Vitals:    01/05/17 1103 01/05/17 1138   BP: 116/56    Patient Site: Left Arm    Patient Position: Sitting    Cuff Size: Adult Regular    Pulse: (!) 107 (!) 102   Temp: 97.8  F (36.6  C)    SpO2: (!) 89% 91%   Weight: 101 lb 6 oz (46 kg)      Wt Readings from Last 3 Encounters:   01/05/17 101 lb 6 oz (46 kg)   09/08/16 (!) 97 lb 11.2 oz (44.3 kg)   04/12/16 (!) 96 lb 11.2 oz (43.9 kg)       PHYSICAL EXAM:  Constitutional: Patient is awake and alert.  No distress.   Head: Normocephalic and atraumatic.   Right Ear: External ear normal.   Left Ear: External ear normal.   Nose: Nose normal.   Mouth/Throat: Aphthous ulcer at distal tip of her tongue. Peritonsillar erythema.   Eyes: Conjunctivae and EOM are normal. Right eye exhibits clear discharge. Left eye exhibits clear discharge. No scleral icterus.   Cardiovascular: Normal rate, regular rhythm   Pulmonary/Chest: Effort normal and breath sounds normal. No stridor. No respiratory distress. Patient has no wheezes, no rales, exhibits no tenderness.   Skin: Pallor skin noted.     COGNITIVE EXAM    Cognitive Function: Oriented to person, place, time, and situation.    Recent/Remote Memory: She is able to identify what she had for breakfast this morning and what she had for supper last night.     Intelligence: not assessed    Attention Span: not  assessed    Concentration: not assessed    Computation: not assessed         ADDITIONAL HISTORY SUMMARIZED (2): Reviewed note from 9/8/2016 regarding last office visit with Dr. Roth.; reviewed PET scans  DECISION TO OBTAIN EXTRA INFORMATION (1): None.   RADIOLOGY TESTS (1): Ordered chest XR.  LABS (1): Reviewed and ordered labs today.  MEDICINE TESTS (1): referral to pulm.  INDEPENDENT REVIEW (2 each): None.     Mei RAMOS, am scribing for and in the presence of, Dr. Leigh.    I, Dr. Leigh, personally  performed the services described in this documentation, as scribed by Mei Stevenson in my presence, and it is both accurate and complete.    MEDICATIONS:  Current Outpatient Prescriptions   Medication Sig Dispense Refill     acetaminophen-codeine (TYLENOL #3) 300-30 mg per tablet TAKE 1 TABLET BY MOUTH EVERY 6 HOURS AS NEEDED FOR PAIN 90 tablet 0     alum/mag hydrox-simethicone-diphenhydramine-lidocaine (MAGIC MOUTHWASH) suspension Swish and spit 15 mL 4 (four) times a day. 240 mL 0     fluticasone-salmeterol (ADVAIR DISKUS) 250-50 mcg/dose DISKUS Inhale 1 puff 2 (two) times a day. 1 each 0     LORazepam (ATIVAN) 0.5 MG tablet TAKE 1 TABLET BY MOUTH TWICE DAILY 60 tablet 0     nortriptyline (PAMELOR) 25 MG capsule TAKE 4 CAPSULES (100 MG    TOTAL) AT BEDTIME 360 capsule 3     OXcarbazepine (TRILEPTAL) 150 MG tablet 2 in am,2 in pm, 2 hs total 6 tab each 24 hr . This replaces 300mg tabs 540 tablet 1     tiotropium (SPIRIVA) 18 mcg inhalation capsule Place 2 puffs into inhaler and inhale daily. 30 capsule 11     topiramate (TOPAMAX) 50 MG tablet TAKE 1 TABLET BY MOUTH TWICE DAILY 180 tablet 1     azithromycin (ZITHROMAX Z-ENEDELIA) 250 MG tablet Take 2 tablets (500 mg) on  Day 1,  followed by 1 tablet (250 mg) once daily on Days 2 through 5. 6 tablet 0     potassium chloride (KLOR-CON) 10 MEQ CR tablet Take 1 tablet (10 mEq total) by mouth daily. 30 tablet 0     No current facility-administered medications for this visit.        Total data points: 5

## 2021-06-08 NOTE — TELEPHONE ENCOUNTER
Reached out to patient and left a message to return phone call. When patient calls back, please relay the below message. Thank you,   Nisa Rangel

## 2021-06-08 NOTE — PROGRESS NOTES
CCx:Follow-up of COPD    HPI:Ms. Chaney is a 69-year-old lady with a past medical history significant for trigeminal neuralgia, hyperlipidemia, migraine headaches, significant history of smoking who presents to clinic today for the aforementioned chief complaint.  She was treated for PET positive right upper lobe mass which was initially thought to be lung cancer but resolved with administration of antibiotics.  She subsequently underwent lung function test which demonstrated very severe COPD, severe diffusion limitation and air trapping.  She states that she developed a Strep throat a few weeks ago and was very uncomfortable with this. Due to ongoing symptoms, her PCP ordered a CXR which was concerning for a RML mass, a subsequent Chest CT demonstrated improvement in her infiltrates. She also describes that she has had multiple teeth extracted in December and currently cannot afford to have new ones put in. She has been prescribed Advair which she is only using on an as needed basis. She denies chest pain, SOB, chest tightness or wheezing. She continues to engage in meals on wheels.     PMH:  Reviewed in epic.    PSH:  Reviewed in epic.    Allergies:  Reviewed in epic.    Family HX:  Reviewed in epic.     Social Hx:  Unchanged from previous visit.    Current Meds:  Current Outpatient Prescriptions   Medication Sig Dispense Refill     acetaminophen-codeine (TYLENOL #3) 300-30 mg per tablet TAKE 1 TABLET BY MOUTH EVERY 6 HOURS AS NEEDED FOR PAIN 90 tablet 0     fluticasone-salmeterol (ADVAIR DISKUS) 250-50 mcg/dose DISKUS Inhale 1 puff 2 (two) times a day. 1 each 0     LORazepam (ATIVAN) 0.5 MG tablet TAKE 1 TABLET BY MOUTH TWICE DAILY 60 tablet 0     mupirocin (BACTROBAN) 2 % ointment Apply to affected area right nasal region 3 times daily for 7 days 22 g 0     nortriptyline (PAMELOR) 25 MG capsule TAKE 4 CAPSULES (100 MG    TOTAL) AT BEDTIME 360 capsule 3     OXcarbazepine (TRILEPTAL) 150 MG tablet 2 in am,2 in pm,  2 hs total 6 tab each 24 hr . This replaces 300mg tabs 540 tablet 1     topiramate (TOPAMAX) 50 MG tablet TAKE 1 TABLET BY MOUTH TWICE DAILY 180 tablet 1     No current facility-administered medications for this visit.      Imaging:  CT Chest 1/25/17:  1. Improved prior middle lobe masslike opacity. Central airways are clear.  2. Current findings of infectious/inflammatory airways disease with regions of airway thickening, bronchiectasis and scattered endobronchial contents (differentials of mucous plugging or aspirated material).  3. Patchy bilateral opacities, nodular consolidation, subpleural nodular thickening and tree-in-bud nodules. Findings are likely infectious/inflammatory. Differentials include atypical infection (such as nontuberculous mycobacterium) or organizing pneumonia given some peripheral and bronchovascular predominance (differential for organizing pneumonia includes chronic eosinophilic pneumonia). Recommend 3 month follow-up chest CT.  4. Mild emphysema.    Physical Exam:  Visit Vitals     /64     Pulse 64     Resp 18     Wt (!) 97 lb 4.8 oz (44.1 kg)     SpO2 98%  Comment: RA     BMI 16.19 kg/m2     GEN: AO*3  HEENT: PERRL, EOMI  CHEST: CTA BL, ?  CVS: S1 & S2 audible  ABDOMEN: No HSM, Bowel sounds audible  EXT: No edema, no rashes  NEURO: CN2-12 intact, no gross motor or sensory deficits  PSYCH: Grossly intact    Assessment and Plan:Julisa Chaney is a 71 y.o. with a past medical history significant for trigeminal neuralgia, hyperlipidemia, migraine headaches, significant history of smoking who presents to clinic today for a follow-up visit for COPD and recent mass like infiltrate in the RML which appears to be resolving (on repeat imaging), leading me to believe that this is most likely infectious.  For the present we would recommend;    1. RML infiltrate: Etiology as above. Symptoms have improved on empiric antibiotics.    Repeat CXR in 6 weeks to confirm resolution. If normal, I have  explained to her that I will send her a [employer] message.  2. COPD: Severe obstruction noted on lung function tests and severe reduction of diffusion capacity.  Has normal oxygen saturations on room air.    I explained to her again that she needs to be compliant with Advair and not use this on an as needed basis. I explained once again that she needs to gargle after using this.    When necessary albuterol.  3. HCM: Up-to-date with influenza and pneumococcal vaccination.  4. Follow-up: 9 months.      Marycarmen Hansen  Pulmonary and Critical Care  [id number]
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
## 2021-06-08 NOTE — TELEPHONE ENCOUNTER
Please help her make medicare video visit physical today and we will discuss med then. If not able to help schedule for July. If no video visit capability Will do telephone for med refill today    aMra Murillo MD 6/11/2020 7:01 AM

## 2021-06-08 NOTE — TELEPHONE ENCOUNTER
Patient Returning Call  Reason for call:  Patient returning call to the clinic.  Information relayed to patient:  Yes as instructed and patient agrees with plan to schedule a phone visit.  Patient has additional questions:  Yes  If YES, what are your questions/concerns:  Can I get this medication filled today? I am in a lot of pain.   Okay to leave a detailed message?: Yes

## 2021-06-08 NOTE — PROGRESS NOTES
ASSESSMENT & PLAN:  1. Cough  - CT Chest With Contrast; Future    2. Anemia  - Morphology,Smear Review (MORP)    3. Hypokalemia  - Basic Metabolic Panel    4. Postobstructive pneumonia  - alum/mag hydrox-simethicone-diphenhydramine-lidocaine (MAGIC MOUTHWASH) suspension; Swish and spit 15 mL 4 (four) times a day.  Dispense: 240 mL; Refill: 0    5. Oral aphthous ulcer    6. Rash    We will do a chest CT.  We'll also assess her with scheduling with pulmonologist.  Discussed her results from last visit.  We will repeat her labs.  Await results prior to further recommendations.  Immunocompromised.  Increase her supplements to twice a day.  Likely related to hypoalbuminemia.  For aphthous ulcer, use Magic mouthwash. She has a rash in her nasal region, component of impetigo.  We'll start with mupirocin ointment.  Recheck in a week if persistent or worse.  Limit Tylenol with codeine use.  She still has prescriptions remaining.  We'll hold off on refill.  She was agreeable with the plans.     Orders Placed This Encounter   Procedures     CT Chest With Contrast     ? H/o lung mass     Standing Status:   Future     Standing Expiration Date:   1/10/2018     Order Specific Question:   Can the procedure be changed per Radiologist protocol?     Answer:   Yes     Order Specific Question:   If this is a diagnostic procedure, have the patient's age and recent imaging history been considered?     Answer:   Yes     Basic Metabolic Panel     Morphology,Smear Review (MORP)     Medications Discontinued During This Encounter   Medication Reason     alum/mag hydrox-simethicone-diphenhydramine-lidocaine (MAGIC MOUTHWASH) suspension Reorder     azithromycin (ZITHROMAX Z-ENEDELIA) 250 MG tablet Therapy completed          CHIEF COMPLAINT:  Chief Complaint   Patient presents with     Follow-up     Strep throat- now cold sores/ulcers- pt took last antibiotic today.         HISTORY OF PRESENT ILLNESS:  Julisa is a 71 y.o. female presenting to the  clinic today to follow up on her recent Strep, mouth lesions, and lab work results. She lives on her own.     Recent Strep Infection: She felt like she was coming down with a bad cold; she was seen in clinic on 1/5/2017 by Dr. Leigh and she learned she had Strep throat. She took her last tablet of the Z-thea she was prescribed today; she does not think she is getting much better. Her cough started December 26, 2016 and it has been a dry cough; she thinks it is getting better. She notes everyone at the house party she was at seemed healthy and well. The party was in [place], [place] at her sister's house. Her WBC was 16.6 on 1/5/2017 and 7.4 on 4/12/2016. Her hemoglobin was 9.2 on 1/5/2017 and 9.9 on 4/12/2016.     Trigeminal Neuralgia: She is wondering if she could have more Tylenol #3 for her pain. She wakes up at night and she takes 2 Tylenol #3 at that time. She has been taking approximately 5 tablets Tylenol #3 per day. She still has some tablets left.    Ulcerative Lesions in the Mouth: The sores in her mouth started maybe a couple days after she found out she had Strep throat. She has Magic Mouthwash but she has not started using it yet; she is wondering if it would help with the sores in her mouth. Her mouth is in pain. She does not think she has been biting her tongue accidentally. The spot on her tongue is new.    REVIEW OF SYSTEMS:   Hypokalemia: She has not picked up potassium supplements for her hypokalemia treatment yet; she notes she will pick it up today.     SOB: She uses her Advair inhaler for occasionally dyspnea; she has been using it about once per week. She rinses her mouth after each use. She did not schedule an appointment with the pulmonologist yet.     Malnutrition: Eating is difficult for her because she has to cook for herself; she mostly makes herself frozen dinners. She has been trying to keep her weight up; she tries to eat three meals each day. She tries to hydrate herself well. She takes  Bounce liquid supplements, one per day. She notes it does not taste that bad but it is difficult to drink the whole thing.     She denies any fever, chills, nausea, vomiting, hemoptysis, hematuria, epistaxis, diarrhea, hematochezia, or sudden weight fluctuations. All other systems are negative.     PFSH:     TOBACCO USE:  History   Smoking Status     Former Smoker     Packs/day: 1.00     Years: 50.00     Quit date: 11/16/2014   Smokeless Tobacco     Never Used     Comment: stop smoking packet given        VITALS:  Vitals:    01/10/17 1046   BP: 122/62   Patient Site: Left Arm   Patient Position: Sitting   Cuff Size: Adult Regular   Temp: 97.3  F (36.3  C)   TempSrc: Oral   SpO2: 95%   Weight: (!) 95 lb 12.8 oz (43.5 kg)     Wt Readings from Last 3 Encounters:   01/10/17 (!) 95 lb 12.8 oz (43.5 kg)   01/05/17 101 lb 6 oz (46 kg)   09/08/16 (!) 97 lb 11.2 oz (44.3 kg)     Body mass index is 15.94 kg/(m^2).     PHYSICAL EXAM:  Visit Vitals     /62 (Patient Site: Left Arm, Patient Position: Sitting, Cuff Size: Adult Regular)     Temp 97.3  F (36.3  C) (Oral)     Wt (!) 95 lb 12.8 oz (43.5 kg)     SpO2 95%     Breastfeeding No     BMI 15.94 kg/m2       Vital signs noted above. AAO ×3.  HEENT no nasal discharge, slightly dry oral mucosa.  Aphthous ulcer noted in her left upper and lower lip, tip of her tongue.  No blister lesions.  Neck: Supple neck, nonpalpable cervical lymph nodes. Lungs: Clear to auscultation bilateral.  Heart: S1-S2 regular rate and rhythm, no murmurs were noted.  Abdomen: Lab, soft with bowel sounds and nontender.  Extremities: Minimal bipedal edema, pulses were full and equal.  Skin: Erythematous patch on her right inferior nasal region with dry yellowish scab.  No bleeding, blister.    QUALITY MEASURES:  The following low BMI interventions were performed this visit: weight gain advised, dietary education for weight gain and lifestyle education regarding diet    DATA REVIEWED:  ADDITIONAL  HISTORY SUMMARIZED (2): Reviewed Dr. Leigh's note 1/5/2017.  DECISION TO OBTAIN EXTRA INFORMATION (1): None.   RADIOLOGY TESTS (1): Reviewed x-ray report 1/5/2017. Ordered chest CT scan.   LABS (1): Reviewed and ordered labs.  MEDICINE TESTS (1): None.  INDEPENDENT REVIEW (2 each): None.     XR CHEST PA AND LATERAL 1/5/2017 11:58 AM INDICATION: Cough. COMPARISON: PET/CT 02/09/2015.FINDINGS: Patchy right mid to lower lung opacities suggestive appear mildly increased compared to PET/CT 02/09/2015, possibly related to pneumonia, though should be followed to resolution to exclude other etiologies (history of malignancy). Recommend 6-8 week follow-up. No pleural effusion. Normal heart size. NOTE: ABNORMAL REPORT THE DICTATION ABOVE DESCRIBES AN ABNORMALITY FOR WHICH FOLLOW-UP IS NEEDED. This report was electronically interpreted by: Dr. Elton Mcelroy DO ON 01/05/2017 at 13:17.    The visit lasted a total of 20 minutes face to face with the patient. Over 50% of the time was spent counseling and educating the patient about the results of her lab work done on 1/5/2017, her ulcerative mouth lesions, need for pulmonology follow up, and pain.     IMargaret, am scribing for and in the presence of Dr. Roth.  I, Dr. Roth, personally performed the services described in this documentation, as scribed by Margaret Multani in my presence, and it is both accurate and complete.     MEDICATIONS:  Current Outpatient Prescriptions   Medication Sig Dispense Refill     acetaminophen-codeine (TYLENOL #3) 300-30 mg per tablet TAKE 1 TABLET BY MOUTH EVERY 6 HOURS AS NEEDED FOR PAIN 90 tablet 0     alum/mag hydrox-simethicone-diphenhydramine-lidocaine (MAGIC MOUTHWASH) suspension Swish and spit 15 mL 4 (four) times a day. 240 mL 0     fluticasone-salmeterol (ADVAIR DISKUS) 250-50 mcg/dose DISKUS Inhale 1 puff 2 (two) times a day. 1 each 0     LORazepam (ATIVAN) 0.5 MG tablet TAKE 1 TABLET BY MOUTH TWICE DAILY 60 tablet 0     nortriptyline  (PAMELOR) 25 MG capsule TAKE 4 CAPSULES (100 MG    TOTAL) AT BEDTIME 360 capsule 3     OXcarbazepine (TRILEPTAL) 150 MG tablet 2 in am,2 in pm, 2 hs total 6 tab each 24 hr . This replaces 300mg tabs 540 tablet 1     potassium chloride (KLOR-CON) 10 MEQ CR tablet TAKE 1 TABLET(10 MEQ) BY MOUTH DAILY 90 tablet 0     tiotropium (SPIRIVA) 18 mcg inhalation capsule Place 2 puffs into inhaler and inhale daily. 30 capsule 11     topiramate (TOPAMAX) 50 MG tablet TAKE 1 TABLET BY MOUTH TWICE DAILY 180 tablet 1     mupirocin (BACTROBAN) 2 % ointment Apply to affected area right nasal region 3 times daily for 7 days 22 g 0     No current facility-administered medications for this visit.         Total data points: 4

## 2021-06-09 NOTE — PROGRESS NOTES
Assessment/Plan:        Diagnoses and all orders for this visit:    Facial pain  -     ketorolac injection 15 mg (TORADOL); Inject 1 mL (15 mg total) into the shoulder, thigh, or buttocks once as needed for moderate pain (4-6).    Trigeminal neuralgia    Other orders  -     topiramate (TOPAMAX) 50 MG tablet; TAKE 1.5 TABLET BY MOUTH TWICE DAILY  Dispense: 180 tablet; Refill: 1  -     HYDROcodone-acetaminophen 5-325 mg per tablet; Take 1 tablet by mouth every 6 (six) hours as needed for pain. Avoid driving or operating heavy machineries with medication  Dispense: 20 tablet; Refill: 0        Continue with the prednisone.  We'll provide her with Toradol 15 mg IM ×1 today.  She is to increase her Topamax to 75 mg twice a day and if still noted but better after a week, then increase to 75 mg in the morning and 100 mg in the evening.  We'll provide her with Vicodin.  To hold off on the Tylenol 3 while on the Vicodin.  If persistent and worse after a week, consider referral to neurologist.  She was agreeable with the plans.  Subjective:    Patient ID: Julisa Chaney is a 71 y.o. female.    BESS Egan is here for follow-up from her visit last week for facial pain.  History of trigeminal neuralgia, migraine headache.  She does not feel that this is her typical migraine.  She is currently on Topamax 50 mg twice a day, Trileptal 150 mg 2 tablets 3 times a day.  She takes Nortriptyline in 25 mg 4 capsules at bedtime.  She also has Tylenol 3.  She has been taking it 1 tablet every 6 hours.  We gave her Toradol shot which helped for 2 days.  We also started her on prednisone and currently on day 3 of treatment.  She feels that it is back and she has a hard time with eating.  Worse with eating or talking.  Denies any syncopal episodes, nausea, vomiting, fever, chills.  No vision changes.  Feels deep, nerve pain.  She has several teeth that need to be removed.  Hasn't been able to visit with her dentist yet.  She had 6 teeth  removed in December 2016 from her lower region.    Review of Systems  As above otherwise negative.         Objective:    Physical Exam  Visit Vitals     /80 (Patient Site: Left Arm, Patient Position: Sitting, Cuff Size: Adult Regular)     Temp 97.7  F (36.5  C) (Oral)     Resp 14     Breastfeeding No       Vital signs noted above. AAO ×3.  HEENT no nasal discharge, moist oral mucosa.  No swelling in her left temporal region.  Feels that the pain is deep, nerve pain.  Pupils 2-3 mm equally reactive to light.  Neck: Supple neck, nonpalpable cervical lymph nodes.  Lungs: Clear to auscultation bilateral.  Heart: S1-S2 regular rate and rhythm, no murmurs were noted.  Abdomen: Flat, soft with bowel sounds.  Extremities: pulses were full and equal.

## 2021-06-09 NOTE — PROGRESS NOTES
I have called Julisa 3 times over the past two weeks and have been unsuccessfull in reaching this patient for 1 month now.  This patient has not returned any of my messages.  I will continue attempting to reach out to this patient in one month.  I will also check this patient's chart for upcoming appointments, ER reports that may contain a new phone number, or any other recent activity.  I have informed the primary care provider by tasking regarding the lack of successful follow up.

## 2021-06-09 NOTE — PROGRESS NOTES
Documentation only encounter to record results of depression screening.  Depression screening completed via [employer] (formerly, [employer]).  See flow sheet for screening.

## 2021-06-09 NOTE — TELEPHONE ENCOUNTER
Controlled Substance Refill Request  Medication Name:   Requested Prescriptions     Pending Prescriptions Disp Refills     acetaminophen-codeine (TYLENOL #3) 300-30 mg per tablet [Pharmacy Med Name: ACETAMINOPHEN/COD #3 (300/30MG) TAB] 90 tablet 0     Sig: TAKE 1 TABLET BY MOUTH EVERY 6 HOURS AS NEEDED FOR PAIN     Date Last Fill: 6/18/20  Requested Pharmacy: [facility]  Print RX for patient to  at   Controlled Substance Agreement on file:   Encounter-Level CSA Scan Date - 07/09/2018:    Scan on 7/11/2018 12:40 PM           Encounter-Level CSA Scan Date - 09/08/2016:    Scan on 9/9/2016 11:19 AM        Last office visit:  6/15/20

## 2021-06-09 NOTE — TELEPHONE ENCOUNTER
Medication Question or Clarification  Who is calling: Julisa  What medication are you calling about (include dose and sig)?: Tylenol #3, one tablet three times a day   Who prescribed the medication?:Dr. Murillo  What is your question/concern?:  She normally gets a quantity of 120 tablets.  Why did she only get 30 tablets this last fill?  Requested Pharmacy: [facility]  Okay to leave a detailed message?: Yes

## 2021-06-09 NOTE — TELEPHONE ENCOUNTER
RN Triage:     Patient asking for her prescription to be refilled.   Face pain an not able to eat.     Tylenol 3     Please see refill request from 7/14/20    Reshma Velez RN, BSN [facility] Triage Nurse        Reason for Disposition    Request for URGENT new prescription or refill of 'essential' medication (i.e., likelihood of harm to patient if not taken) and triager unable to fill per department policy    Additional Information    Negative: MORE THAN A DOUBLE DOSE of a prescription or over-the-counter (OTC) drug    Negative: DOUBLE DOSE (an extra dose or lesser amount) of over-the-counter (OTC) drug and any symptoms (e.g., dizziness, nausea, pain, sleepiness)    Negative: DOUBLE DOSE (an extra dose or lesser amount) of prescription drug and any symptoms (e.g., dizziness, nausea, pain, sleepiness)    Negative: Took another person's prescription drug    Negative: Drug overdose and triager unable to answer question    Negative: Caller requesting information unrelated to medicine    Negative: Caller requesting a prescription for Strep throat and has a positive culture result    Negative: Rash while taking a medication or within 3 days of stopping it    Negative: Immunization reaction suspected    Negative: Asthma and having symptoms of asthma (cough, wheezing, etc.)    Negative: Breastfeeding questions about mother's medicines and diet    Negative: DOUBLE DOSE (an extra dose or lesser amount) of prescription drug and NO symptoms (Exception: a double dose of antibiotics)    Negative: Diabetes drug error or overdose (e.g., took wrong type of insulin or took extra dose)    Negative: Caller has medication question about med not prescribed by PCP and triager unable to answer question (e.g., compatibility with other med, storage)    Protocols used: MEDICATION QUESTION CALL-A-OH

## 2021-06-09 NOTE — PROGRESS NOTES
ASSESSMENT & PLAN:  1. Facial pain  - ketorolac injection 15 mg (TORADOL); Inject 1 mL (15 mg total) into the shoulder, thigh, or buttocks once.    2. Trigeminal neuralgia      No other concerning findings noted at this time.  We'll provide her with Toradol 15 mg ×1.  Continue with Tylenol 3.  Add prednisone.  Discussed symptoms to note for that would require ER visit.  She was agreeable with the plans.    Administrations This Visit     ketorolac injection 15 mg (TORADOL)     Admin Date Action Dose Route Administered By             03/02/2017 Given 15 mg Intramuscular Keri Negron CMA                         CHIEF COMPLAINT:  Chief Complaint   Patient presents with     Facial Pain     Started this weekend.         HISTORY OF PRESENT ILLNESS:  Julisa is a 71 y.o. female presenting to the clinic today for evaluation of a trigeminal neuralgia exacerbation. Her face pain has been intermittent since last weekend, so for about 6 days now. The most painful area is on her left frontal forehead; she notes she did not fall or injure herself. She has seen a specialist for her facial pain before and she notes her condition is called trigeminal neuralgia; she is confident this pain is her trigeminal neuralgia pain. It has been about 4 years since she has had a flare of her trigeminal neuralgia; she has not been exceeding 4 Tylenol #3 per day until about 6 days ago when this pain started. She has already taken 2 Tylenol 3 today but it was not helpful for her pain. Her pain is exacerbated by eating, so she has not been eating much; she states she is eating 1-2 times per day, but only soft foods and water because chewing is painful. She has been feeling weak because she has not been eating much. Her pain is 8/10 when she is having a flare, and the flares have been intermittent but daily for the past 6 days or so; she describes the pain as burning, throbbing, and like lightning. She notes this is not like her typical migraine  pain. Laying down is helpful for the pain, so she has been laying down a lot at home; her pain is not too bad right now because she was just laying down. Talking also exacerbates her pain, so the more she talks right now the worse her pain is getting. The pain has not woken her up from sleep yet. Her daughter is in the area and drove her here today; she has never driven. She last took 2 Tylenol #3 at 8 am along with her Topamax, and her pain is coming back now. She has been hydrating herself well. She has been trying to drink the liquid nutritional supplements but they are painful to consume. She notes she is also taking Trileptal, and she is wondering if that is a steroid. She is wondering if she could have a few stronger pain pills so that she could start eating again. She would be interested in an injection for her pain today.     REVIEW OF SYSTEMS:   She saw the pulmonologist on 2/1/2017 and she was told to follow up in 3-4 months. She denies any fever, chills, shaking, sudden loss of vision, emesis, dizziness, lightheadedness, fainting, LOC, localized numbness or weakness, or recent cough or colds. She has recovered from her Strep infection which was diagnosed by Dr. Leigh on 1/5/2017. All other systems are negative.     PFSH:     TOBACCO USE:  History   Smoking Status     Former Smoker     Packs/day: 1.00     Years: 50.00     Quit date: 11/16/2014   Smokeless Tobacco     Never Used     Comment: stop smoking packet given        VITALS:  Vitals:    03/02/17 1101 03/02/17 1123   BP: 130/80    Patient Site: Left Arm    Patient Position: Sitting    Cuff Size: Adult Small    Temp: 97.4  F (36.3  C)    TempSrc: Oral    SpO2:  97%   Weight: (!) 98 lb 9.6 oz (44.7 kg)      Wt Readings from Last 3 Encounters:   03/02/17 (!) 98 lb 9.6 oz (44.7 kg)   02/01/17 (!) 97 lb 4.8 oz (44.1 kg)   01/10/17 (!) 95 lb 12.8 oz (43.5 kg)     Body mass index is 16.41 kg/(m^2).     PHYSICAL EXAM:  Visit Vitals     /80 (Patient Site:  Left Arm, Patient Position: Sitting, Cuff Size: Adult Small)     Temp 97.4  F (36.3  C) (Oral)     Wt (!) 98 lb 9.6 oz (44.7 kg)     SpO2 97%     Breastfeeding No     BMI 16.41 kg/m2       Vital signs noted above. AAO ×3.  HEENT no nasal discharge, moist oral mucosa.  Pupils 2-3 mm equally reactive to light.  No nystagmus.  Ears: TMs intact bilateral, no fluid collection.  Neck: Supple neck, nonpalpable cervical lymph nodes. Lungs: Clear to auscultation bilateral.  Heart: S1-S2 regular rate and rhythm, no murmurs were noted.  Abdomen: with bowel sounds.  Extremities: pulses were full and equal.    QUALITY MEASURES:  The following low BMI interventions were performed this visit: weight gain advised and lifestyle education regarding diet    DATA REVIEWED:  ADDITIONAL HISTORY SUMMARIZED (2): Reviewed nurse triage note 3/1/2017.   DECISION TO OBTAIN EXTRA INFORMATION (1): None.   RADIOLOGY TESTS (1): None.  LABS (1): None.  MEDICINE TESTS (1): None.  INDEPENDENT REVIEW (2 each): None.     The visit lasted a total of 16 minutes face to face with the patient. Over 50% of the time was spent counseling and educating the patient about pain, nutrition, and trigeminal neuralgia.     Margaret RAMOS, am scribing for and in the presence of Dr. Roth.  IDr. Roth, personally performed the services described in this documentation, as scribed by Margaret Multani in my presence, and it is both accurate and complete.     MEDICATIONS:  Current Outpatient Prescriptions   Medication Sig Dispense Refill     acetaminophen-codeine (TYLENOL #3) 300-30 mg per tablet TAKE 1 TABLET BY MOUTH EVERY 6 HOURS AS NEEDED FOR PAIN 90 tablet 0     acetaminophen-codeine (TYLENOL #3) 300-30 mg per tablet TAKE 1 TABLET BY MOUTH EVERY 6 HOURS AS NEEDED FOR PAIN 120 tablet 0     fluticasone-salmeterol (ADVAIR DISKUS) 250-50 mcg/dose DISKUS Inhale 1 puff 2 (two) times a day. 1 each 0     LORazepam (ATIVAN) 0.5 MG tablet TAKE 1 TABLET BY MOUTH TWICE DAILY 60  tablet 0     mupirocin (BACTROBAN) 2 % ointment Apply to affected area right nasal region 3 times daily for 7 days 22 g 0     nortriptyline (PAMELOR) 25 MG capsule TAKE 4 CAPSULES (100 MG    TOTAL) AT BEDTIME 360 capsule 3     OXcarbazepine (TRILEPTAL) 150 MG tablet 2 in am,2 in pm, 2 hs total 6 tab each 24 hr . This replaces 300mg tabs 540 tablet 1     topiramate (TOPAMAX) 50 MG tablet TAKE 1 TABLET BY MOUTH TWICE DAILY 180 tablet 1     predniSONE (DELTASONE) 20 MG tablet Take 2 tablets orally daily for 5 days then 1 tablet orally daily for another 5 days 15 tablet 0     No current facility-administered medications for this visit.         Total data points: 2

## 2021-06-10 NOTE — TELEPHONE ENCOUNTER
RN Triage  Julisa is calling today reporting possible tooth infection. Has not been able to eat anything for past few days due to pain. Has had tooth infections in the past requiring antibiotics. The pain is so severe and she feels weak and dizzy- she is able to stand up but has to move slowly. Unsure if she has had any fevers.    Julisa sounds unwell to me, she requests antibiotics but I recommended due to weakness and inability to eat she should be seen in ER today. She will have her daughter drive her.    Sharron Weathers RN  [facility] Nurse Advisor      Additional Information    Negative: Pale cold skin and very weak (can't stand)    Negative: Similar pain previously and it was from 'heart attack'    Negative: Similar pain previously and it was from 'angina' and not relieved by nitroglycerin    Negative: Sounds like a life-threatening emergency to the triager    Negative: Face is swollen    Negative: Fever    Patient sounds very sick or weak to the triager    Protocols used: TOOTHACHE-A-[place]    COVID 19 Nurse Triage Plan/Patient Instructions    Please be aware that novel coronavirus (COVID-19) may be circulating in the community. If you develop symptoms such as fever, cough, or SOB or if you have concerns about the presence of another infection including coronavirus (COVID-19), please contact your health care provider or visit [email or web address].     Disposition/Instructions    ED Visit recommended. Follow protocol based instructions.      Bring Your Own Device:  Please also bring your smart device(s) (smart phones, tablets, laptops) and their charging cables for your personal use and to communicate with your care team during your visit.      Thank you for taking steps to prevent the spread of this virus.  o Limit your contact with others.  o Wear a simple mask to cover your cough.  o Wash your hands well and often.    Resources    [facility]: About COVID-19: [email or web address]    CDC: What  to Do If You're Sick: www.cdc.gov/coronavirus/2019-ncov/about/steps-when-sick.html    CDC: Ending Home Isolation: www.cdc.gov/coronavirus/2019-ncov/hcp/disposition-in-home-patients.html     CDC: Caring for Someone: www.cdc.gov/coronavirus/2019-ncov/if-you-are-sick/care-for-someone.html     [facility]: Interim Guidance for Hospital Discharge to Home: www.[facility].[place]./diseases/coronavirus/hcp/hospdischarge.pdf    [facility] clinical trials (COVID-19 research studies): clinicalaffairs.[facility]-clinical-trials     Below are the COVID-19 hotlines at the [place] [facility] ([facility]). Interpreters are available.   o For health questions: Call 799-628-8271 or 1-784.363.9693 (7 a.m. to 7 p.m.)  o For questions about schools and childcare: Call 273-977-8320 or 1-161.893.9912 (7 a.m. to 7 p.m.)

## 2021-06-10 NOTE — TELEPHONE ENCOUNTER
Patient Returning Call  Reason for call:  Patient called back.  Information relayed to patient:  Informed of Dr Murillo's message listed below.  Patient states she would like to talk to providers nurse.  Please call.  Patient has additional questions:  Yes  If YES, what are your questions/concerns:  As above.  Okay to leave a detailed message?: Yes

## 2021-06-10 NOTE — ANESTHESIA PREPROCEDURE EVALUATION
Anesthesia Evaluation      Patient summary reviewed   No history of anesthetic complications     Airway   Mallampati: III  Neck ROM: full   Pulmonary - negative ROS and normal exam                          Cardiovascular - negative ROS and normal exam  Exercise tolerance: > or = 4 METS   Neuro/Psych    (+) anxiety/panic attacks, chronic pain  (-) no neuromuscular disease    Endo/Other - negative ROS      GI/Hepatic/Renal - negative ROS      Other findings: Long-standing facial pain, possible trigeminal neuralgia.  Migraines.  Diffuse rash, improving.  Fe def. Anemia.    No evidence of overt GI bleeding. Here today for further evaluation with EGD and colonoscopy.  Covid negative 8/8.  Hg 8.9, K 3.5, GFR>60.    Echo 8/10/20: Normal left ventricular size and systolic performance with a visually estimated ejection fraction of 60-65%.  There is mild aortic insufficiency.  Normal right ventricular size and systolic performance.  No obvious valvular vegetations identified on this study.  Right ventricular systolic pressure relative to right atrial pressure is moderately increased.  The pulmonary artery pressure is estimated to be 50 mmHg plus right atrial pressure (the IVC is of fairly normal caliber).  When compared to the prior real-time echocardiogram dated 11 June 2018, there has been a mild increase in the pulmonary artery pressure estimate.  Otherwise, the findings are fairly similar in both examinations.  Chest CT: Tree-in-bud opacities involving the right upper and right middle lobes of the lung are suspicious for an infectious/inflammatory process. There is a more nodular appearing opacity in the right upper lobe on axial image 138 of series 4 for which a follow-up CT examination in three months is recommended.   Upper incisors capped, marked upper overbite.  Many lower teeth missing, and some are decayed or eroded to gum line.      Dental                         Anesthesia Plan  Planned anesthetic: MAC    ASA 2      Anesthetic plan and risks discussed with: patient  Anesthesia plan special considerations: antiemetics,   Post-op plan: routine recovery

## 2021-06-10 NOTE — TELEPHONE ENCOUNTER
Please advised the patient to go to the dentist because they will be the one who either pull her teeth out and start the antibiotic if needed    Mara Murillo MD 8/6/2020 3:29 PM

## 2021-06-10 NOTE — ANESTHESIA POSTPROCEDURE EVALUATION
Patient: Julisa Chaney  Procedure(s):  COLONOSCOPY WITH POLYPECTOMY  ESOPHAGOGASTRODUODENOSCOPY (EGD)  Anesthesia type: MAC    Patient location: floor  Last vitals:   Vitals Value Taken Time   /58 8/13/2020 12:48 PM   Temp 36.8  C (98.2  F) 8/13/2020 12:48 PM   Pulse 85 8/13/2020 12:48 PM   Resp 16 8/13/2020 12:48 PM   SpO2 95 % 8/13/2020 12:48 PM   Pt transferred from OR to floor, case done MAC.  Post vital signs: stable  Level of consciousness: awake  Post-anesthesia pain: pain controlled  Post-anesthesia nausea and vomiting: no  Pulmonary: supplemental oxygen for transport  Cardiovascular: stable and blood pressure at baseline  Hydration: adequate  Anesthetic events: no    QCDR Measures:  ASA# 11 - Gwendolyn-op Cardiac Arrest: ASA11B - Patient did NOT experience unanticipated cardiac arrest  ASA# 12 - Gwendolyn-op Mortality Rate: ASA12B - Patient did NOT die  ASA# 13 - PACU Re-Intubation Rate: ASA13B - Patient did NOT require a new airway mgmt  ASA# 10 - Composite Anes Safety: ASA10A - No serious adverse event    Additional Notes:

## 2021-06-10 NOTE — ANESTHESIA CARE TRANSFER NOTE
Last vitals:   Vitals:    08/14/20 1433   BP: 138/65   Pulse: 87   Resp: 18   Temp: (!) 35  C (95  F)   SpO2: 100%     Patient's level of consciousness is drowsy  Spontaneous respirations: yes  Maintains airway independently: yes  Dentition unchanged: yes  Oropharynx: oropharynx clear of all foreign objects    QCDR Measures:  ASA# 20 - Surgical Safety Checklist: WHO surgical safety checklist completed prior to induction    PQRS# 430 - Adult PONV Prevention: 4558F - Pt received => 2 anti-emetic agents (different classes) preop & intraop  ASA# 8 - Peds PONV Prevention: NA - Not pediatric patient, not GA or 2 or more risk factors NOT present  PQRS# 424 - Gwendolyn-op Temp Management: 4559F - At least one body temp DOCUMENTED => 35.5C or 95.9F within required timeframe  PQRS# 426 - PACU Transfer Protocol: - Transfer of care checklist used  ASA# 14 - Acute Post-op Pain: ASA14B - Patient did NOT experience pain >= 7 out of 10

## 2021-06-10 NOTE — PROGRESS NOTES
"  TCM DISCHARGE FOLLOW UP CALL      \"Hi, my name is Karishma, and I am calling from  [facility].  I am calling to follow up and see how things are going for you after your recent hospitalization.      Tell me how you are doing now that you are home?\"   Getting better. I've been doing more exercise. I'm not relying on my walker as much.  My grandson is helping me with food. I feel stronger. I'm taking things slow and resting.  I'm continuing on my clear diet.     Discharge Instructions     Do you understand your discharge instructions?  Pt. Response: Yes      \"Has an appointment with your primary care provider been scheduled?\" Yes  \"If yes, when is that appointment?   8/21/2020 @ 10:20 am      Medications    \"Did you have any medication changes?  yes    Do you have any concerns with obtaining the medications or questions about your medications that you would like a RN to review with you?  No  **If yes, escalate to CC RN responsible for patient's clinic or [facility] RN  Send an inbasket Epic message if RN is unavailable after call.     \"When you see the provider, I would recommend that you bring your medications with you.\"    Call Summary    \"What questions or concerns do you have about your recent visit and your follow-up care? No, they did everything they could. Answered all questions I had.            Can be addressed if scheduled with RN or SW either Care Coordination or if declining to triage for further questions.         \"If you have questions or things don't continue to improve, we encourage you contact us through the main clinic number.  Even if the clinic is not open, triage nurses are available 24/7 to help you.        Declined CCC today  "

## 2021-06-10 NOTE — TELEPHONE ENCOUNTER
Medication Question or Clarification  Who is calling: Patient   What medication are you calling about (include dose and sig)?:  acetaminophen-codeine (TYLENOL #3) 300-30 mg per tablet  90 tablet  0  7/15/2020      Sig: TAKE 1 TABLET BY MOUTH EVERY 6 HOURS AS NEEDED FOR PAIN     Sent to pharmacy as: acetaminophen 300 mg-codeine 30 mg tablet (TYLENOL #3)         Who prescribed the medication?: Dr Murillo  What is your question/concern?: Patient states she usually gets a quantity of 120 tablets.   Would like 30 tablets sent to her pharmacy.  [facility] to leave a detailed message?: Yes

## 2021-06-10 NOTE — PROGRESS NOTES
Hospital Follow-up Visit:    Assessment/Plan:     1. Pylorus ulcer, unspecified chronicity    She is on twice a day PPI from the gastroenterologist.  She also has a follow-up with them in a week or 2 which is already scheduled.  She is on a puréed diet until she sees them.  She is also very thin and at risk of getting worse, but she thinks that her hydration status has been good and she is hopeful to get this problem resolved and so that she can start eating and getting her strength back.  As mentioned then, she will follow-up with GI as planned.    2. Electrolyte abnormality    We did recheck her electrolytes today including a comprehensive panel and a magnesium as well as a blood count for her anemia.  We can follow-up with her in the results of become available.  - Comprehensive Metabolic Panel  - Magnesium  - HM1(CBC and Differential)  - HM1 (CBC with Diff)    3. Iron deficiency anemia due to chronic blood loss      4. Trigeminal neuralgia    She had some questions about her chronic pain management and she takes Tylenol 3 and gets those from Dr. Murillo I would defer questions in regards to her pain management to her.  I recommended to follow-up with Dr. Murillo in a couple of weeks just to keep her in the loop.            Subjective:     Julisa Chaney is a 75 y.o. female who presents for a hospital discharge follow up.      Hospital/Nursing Home/IP Rehab Facility: [facility]  Date of Admission: 8/8/2020  Date of Discharge:8/16/2020  Reason(s) for Admission:abdominal pain            Do you have any problems taking your medication regularly?  None       Have you had any changes in your medication since discharge? None       Have you had any difficulty following your discharge or treatment plan?  No    Summary of hospitalization:  Hospital discharge summary reviewed  Diagnostic Tests/Treatments reviewed.  Follow up needed: None  Other Healthcare Providers Involved in Patient's Care: Patient Care Team:  Chidi  MD Mara as PCP - General (Family Medicine)  Raj King MD as Physician (Neurology)  [facility]  Alissa Del Real, Daughter  Mara Murillo MD as Assigned PCP      Update since discharge: {improved   Information from family, SNF, care coordination:      Post Discharge Medication Reconciliation: Post Discharge Medication Reconciliation Status: discharge medications reconciled, continue medications without change  Plan of care communicated with: patient     Objective:     Vitals:    08/21/20 1031   BP: (!) 84/54   Pulse: (!) 117   SpO2: 96%   Weight: 110 lb (49.9 kg)         Physical Exam:    General: Awake, Alert and Cooperative   Head: Normocephalic and Atraumatic   Eyes: PERRL, EOMI.   ENT: Normal pearly TMs bilaterally and Oropharynx clear   Neck: Supple and Thyroid without enlargement or nodules   Chest: Chest wall normal   Lungs: Clear to auscultation bilaterally   Heart:: Regular rate and rhythm and no murmurs.  No significant LE edema.   Abdomen: Soft, nontender, nondistended and no hepatosplenomegaly   Musculoskeletal: Moving all extremities and No pain in the extremities   Neuro: Alert and oriented times 3 and Grossly normal   Skin: No rashes or lesions noted          Coding guidelines for this visit:  Type of Medical   Decision Making Face-to-Face Visit       within 7 Days of discharge Face-to-Face Visit        within 14 days of discharge   Moderate Complexity [id number]   High Complexity [id number]       Electronically signed by Ivan Driver MD 08/21/20 4:38 PM

## 2021-06-10 NOTE — ANESTHESIA POSTPROCEDURE EVALUATION
Patient: Julisa Chaney  Procedure(s):  ESOPHAGOGASTRODUODENOSCOPY (EGD), PYLORIC DILATION  Anesthesia type: MAC    Patient location: [facility] Surgical Floor  Last vitals:   Vitals Value Taken Time   /52 8/15/2020  3:36 PM   Temp 36.6  C (97.9  F) 8/15/2020  3:36 PM   Pulse 96 8/15/2020  3:36 PM   Resp 18 8/15/2020  3:36 PM   SpO2 94 % 8/15/2020  3:36 PM     Post vital signs: stable  Level of consciousness: awake and responds to simple questions  Post-anesthesia pain: pain controlled  Post-anesthesia nausea and vomiting: no  Pulmonary: unassisted, return to baseline  Cardiovascular: stable and blood pressure at baseline  Hydration: adequate  Anesthetic events: no    QCDR Measures:  ASA# 11 - Gwendolyn-op Cardiac Arrest: ASA11B - Patient did NOT experience unanticipated cardiac arrest  ASA# 12 - Gwendolyn-op Mortality Rate: ASA12B - Patient did NOT die  ASA# 13 - PACU Re-Intubation Rate: ASA13B - Patient did NOT require a new airway mgmt  ASA# 10 - Composite Anes Safety: ASA10A - No serious adverse event    Additional Notes:

## 2021-06-10 NOTE — TELEPHONE ENCOUNTER
Who is calling:  Patient   Reason for Call:  Caller stated that she was seen for dental pain and was told to follow up with Mara Murillo MD but caller is needing to know if that will be soon or should she just go to the actual dentist instead of waiting around. Caller also was wondering if she would get started on antibiotics as well.   Date of last appointment with primary care:   Okay to leave a detailed message: Yes

## 2021-06-10 NOTE — PROGRESS NOTES
TCM DISCHARGE FOLLOW UP CALL  [facility]/Voicemail    Clinical Data: Care Coordinator Outreach  Outreach attempted x 1.  Left message on patient's voicemail with call back information and requested return call.

## 2021-06-10 NOTE — TELEPHONE ENCOUNTER
Ok we will refill it for 30 more tab and also she has 120 pills refill starting 8/15    Mara Murillo MD 7/29/2020 11:41 AM

## 2021-06-10 NOTE — TELEPHONE ENCOUNTER
Patient stated she is out of medication due to only receiving 90 tablets, and is questioning why the additional 30 tablets can't be submitted to the pharmacy. Please advise.  Thank you, Nisa Rangel

## 2021-06-11 NOTE — TELEPHONE ENCOUNTER
Controlled Substance Refill Request  Medication Name:   Requested Prescriptions     Pending Prescriptions Disp Refills     acetaminophen-codeine (TYLENOL #3) 300-30 mg per tablet [Pharmacy Med Name: ACETAMINOPHEN/COD #3 (300/30MG) TAB] 120 tablet 0     Sig: TAKE 1 TABLET BY MOUTH EVERY 6 HOURS AS NEEDED FOR PAIN     Date Last Fill: 8/15/20  [facility]  Submit electronically to pharmacy  Controlled Substance Agreement on file:   Encounter-Level CSA Scan Date - 07/09/2018:    Scan on 7/11/2018 12:40 PM           Encounter-Level CSA Scan Date - 09/08/2016:    Scan on 9/9/2016 11:19 AM        Last office visit:  8/21/20

## 2021-06-11 NOTE — TELEPHONE ENCOUNTER
Last seen by Chidi 6/15/20.  Last filled 8/15/20 #120.   checked last filled 8/16/20 for #120.  Gena Lake LPN

## 2021-06-11 NOTE — PROGRESS NOTES
Care Guide called patient.  If this patient is returning our call please transfer to Alexa at ext. [phone].   I have called (patient) 3 times over the past two weeks and have been unsuccessful in reaching them. This patient has also not returned any of my messages.     I will continue attempting to reach out to this patient in one month. I will also check this patient s chart for upcoming appointments, ER reports that may contain a new phone number, or any other recent activity.

## 2021-06-11 NOTE — PROGRESS NOTES
Care Guide called patient.  If this patient is returning our call please transfer to Alexa at ext. [phone].

## 2021-06-11 NOTE — PROGRESS NOTES
Attempt 2-Care Guide called patient.  If this patient is returning our call please transfer to Alexa at ext. [phone].

## 2021-06-11 NOTE — PROGRESS NOTES
Care Guide called patient.  If this patient is returning our call please transfer to Alexa at ext. [phone].

## 2021-06-11 NOTE — PROGRESS NOTES
Assessment/Plan:        Diagnoses and all orders for this visit:    Trigeminal neuralgia  -     Drugs of Abuse 1,Urine    Anxiety    Onychomycosis     Edema  -     Comprehensive Metabolic Panel    Trigeminal nerve disorder  -     acetaminophen-codeine (TYLENOL #3) 300-30 mg per tablet; TAKE 1 TABLET BY MOUTH EVERY 6 HOURS AS NEEDED FOR PAIN  Dispense: 120 tablet; Refill: 0        Discussed concerns with long-term use of her medication considering the amount of medication that she is on and her age.  She was advised to be off the Lorazepam completely.  Discussed options that she can try to improve organizing her day or routines.  She can start by making a list and following through with it.  We also gave her a paper prescription for Tylenol 3.  We will do a urine drug screen.  We will also call her pharmacy to cancel the Tylenol 3 that was sent in advance.  For onychomycosis, discussed limitations with treatment.  Concerns with using oral for her especially with her age.  She was agreeable with just monitoring at this time.  For her edema, limit processed foods, low-salt diet.  Consider preparing her own meal.  Frequent leg elevation.  Recheck if worse.  She was agreeable with the plans.  Subjective:    Patient ID: Julisa Chaney is a 72 y.o. female.    BESS Egan is here for follow-up regarding her medication.  She wonders about refill for Lorazepam.  She had her last prescription sent on May 26.  She mentions initially that she has been off it for maybe 2 months and tries to use around 1-2 per day.  For the most part 1 a day.  Towards the end of the visit, she mentions that she may have been off the medication for a month.  Has been on it since around 2014.  Helps with her anxiety, organizing her thoughts.  She has a hard time finishing throughout task, gets distracted easily.  She likes to do scrapbooking.  She has her daughter and 2 sisters here in [place].  They visited her once in a while to help  organize.  She did some amount hoarding before.  She said that this was mild and better at this time.  She also finds herself having difficulty with sleep on some nights and this helps.  She also has nortriptyline on board, Tylenol 3 4 tablets per day.  Last intake of Tylenol 3 was this morning.  Her trigeminal neuralgia has been better with increasing the Topamax 50 mg 1.5 tablets twice a day.  She also takes Trileptal.  Denies any dizziness, facial pain, tremors, shaking.  No shortness of breath at rest, chest pains.  She mentions that without the medication, she would mostly sit down, watch TV.    Noted to have leg swelling during examination.  Has been consuming more salty foods, processed foods, TV dinners.  No cardiac history that she is aware of.  No PND, two-pillow orthopnea.  She has days where she tries to eat more but overall has decreased appetite.  She is now up to 100 pounds.  Has difficulty gaining weight.  She notes that having electrolyte drinks would improve her energy.    Hopes to have her teeth done and plans to visit with her dentist soon.  Wonders if she needs a clearance for procedures.  She was advised to visit with them and confirm if she needs any procedure.  If she needs a clearance, advised to have another evaluation.    Review of Systems  As above otherwise negative.          Objective:    Physical Exam  /70 (Patient Site: Left Arm, Patient Position: Sitting, Cuff Size: Adult Small)  Pulse 86  Wt 100 lb 6.4 oz (45.5 kg)  SpO2 99%  Breastfeeding? No  BMI 16.2 kg/m2    Vital signs noted above. AAO ×3.  HEENT no nasal discharge, moist oral mucosa. Neck: Supple neck, nonpalpable cervical lymph nodes.  Lungs: Clear to auscultation bilateral.  Heart: S1-S2 regular rate and rhythm, no murmurs were noted.  Abdomen: Flat, soft with bowel sounds and nontender.  Extremities: Bipedal edema with the right more than the left, pulses were full and equal.  Dystrophic nails covering all her  toenails except for the left third and fourth.  Yellowish discoloration.

## 2021-06-12 NOTE — TELEPHONE ENCOUNTER
Refill Request  Did you contact pharmacy: Yes  Medication name:   Requested Prescriptions     Pending Prescriptions Disp Refills     nortriptyline (PAMELOR) 25 MG capsule 360 capsule 4     Si cap twice daily     Who prescribed the medication: Mara Murillo MD  Requested Pharmacy: [facility] [id number]  Is patient out of medication: Yes  Patient notified refills processed in 3 business days:  no  Okay to leave a detailed message: yes

## 2021-06-12 NOTE — TELEPHONE ENCOUNTER
Controlled Substance Refill Request  Medication Name:   Requested Prescriptions     Pending Prescriptions Disp Refills     acetaminophen-codeine (TYLENOL #3) 300-30 mg per tablet [Pharmacy Med Name: ACETAMINOPHEN/COD #3 (300/30MG) TAB] 120 tablet 0     Sig: TAKE 1 TABLET BY MOUTH EVERY 6 HOURS AS NEEDED FOR PAIN     Date Last Fill: 9/16/20  Requested [facility]: [facility]  Submit electronically to pharmacy  Controlled Substance Agreement on file:   Encounter-Level CSA Scan Date - 07/09/2018:    Scan on 7/11/2018 12:40 PM           Encounter-Level CSA Scan Date - 09/08/2016:    Scan on 9/9/2016 11:19 AM        Last office visit:  8/21/20

## 2021-06-12 NOTE — PROGRESS NOTES
Care Guide called patient.  If this patient is returning our call please transfer to Alexa at ext. [phone].  I have called this patient and have been unsuccessful in reaching this patient for 2 months now.  This patient has also not returned any of my messages.    I will continue attempting to reach out to this patient in one month. I will also check this patient s chart for upcoming appointments, ER reports that may contain a new phone number, or any other recent activity

## 2021-06-12 NOTE — TELEPHONE ENCOUNTER
Refill Approved    Rx renewed per Medication Renewal Policy. Medication was last renewed on 19.    Alyce Baker, [facility] Triage/Med Refill 10/23/2020     Requested Prescriptions   Pending Prescriptions Disp Refills     nortriptyline (PAMELOR) 25 MG capsule 360 capsule 4     Si cap twice daily       Tricyclics/Misc Antidepressant/Antianxiety Meds Refill Protocol Passed - 10/23/2020 10:58 AM        Passed - PCP or prescribing provider visit in last year     Last office visit with prescriber/PCP: 2020 Mara Murillo MD OR same dept: 2020 Ivan Driver MD OR same specialty: 2020 Ivan Driver MD  Last physical: Visit date not found Last MTM visit: Visit date not found   Next visit within 3 mo: Visit date not found  Next physical within 3 mo: Visit date not found  Prescriber OR PCP: Mara Murillo MD  Last diagnosis associated with med order: 1. Moderate episode of recurrent major depressive disorder (H)  - nortriptyline (PAMELOR) 25 MG capsule; 2 cap twice daily  Dispense: 360 capsule; Refill: 4    If protocol passes may refill for 12 months if within 3 months of last provider visit (or a total of 15 months).

## 2021-06-13 NOTE — TELEPHONE ENCOUNTER
Refill Approved    Rx renewed per Medication Renewal Policy. Medication was last renewed on 8/18/20.    Alyce Baker, [facility] Triage/Med Refill 11/17/2020     Requested Prescriptions   Pending Prescriptions Disp Refills     topiramate (TOPAMAX) 50 MG tablet [Pharmacy Med Name: TOPIRAMATE 50MG TABLETS] 90 tablet 0     Sig: TAKE 1 TABLET BY MOUTH EVERY DAY       Topiramate Refill Protocol  Passed - 11/15/2020  6:03 AM        Passed - Bicarbonate/Electrolyte panel in last 12 months     Sodium   Date Value Ref Range Status   08/21/2020 139 136 - 145 mmol/L Final     Potassium   Date Value Ref Range Status   08/21/2020 4.9 3.5 - 5.0 mmol/L Final     Chloride   Date Value Ref Range Status   08/21/2020 104 98 - 107 mmol/L Final     CO2   Date Value Ref Range Status   08/21/2020 22 22 - 31 mmol/L Final                Passed - PCP or prescribing provider visit in last 12 months or next 3 months     Last office visit with prescriber/PCP: 1/6/2020 Mara Murillo MD OR same dept: 8/21/2020 Ivan Driver MD OR same specialty: 8/21/2020 Ivan Driver MD  Last physical: Visit date not found Last MTM visit: Visit date not found   Next visit within 3 mo: Visit date not found  Next physical within 3 mo: Visit date not found  Prescriber OR PCP: Mara Murillo MD  Last diagnosis associated with med order: 1. Intractable chronic migraine without aura and without status migrainosus  - topiramate (TOPAMAX) 50 MG tablet [Pharmacy Med Name: TOPIRAMATE 50MG TABLETS]; TAKE 1 TABLET BY MOUTH EVERY DAY  Dispense: 90 tablet; Refill: 0    If protocol passes may refill for 12 months if within 3 months of last provider visit (or a total of 15 months).             Passed - Serum creatinine in last 12 months     Creatinine   Date Value Ref Range Status   08/21/2020 0.75 0.60 - 1.10 mg/dL Final

## 2021-06-13 NOTE — PROGRESS NOTES
I have called Julisa several times over the past 3 months now and sent them a letter.  This patient has not returned any of my messages.  I will continue attempting to reach out to this patient in 3 months.  I will also check this patient's chart for upcoming appointments, ER reports that may contain a new phone number, or any other recent activity.  I have informed the primary care provider by tasking regarding the lack of successful follow up.

## 2021-06-14 NOTE — TELEPHONE ENCOUNTER
Controlled Substance Refill Request  Medication Name:   Requested Prescriptions     Pending Prescriptions Disp Refills     omeprazole (PRILOSEC) 40 MG capsule [Pharmacy Med Name: OMEPRAZOLE 40MG CAPSULES] 90 capsule 0     Sig: TAKE 1 CAPSULE(40 MG) BY MOUTH DAILY BEFORE BREAKFAST     acetaminophen-codeine (TYLENOL #3) 300-30 mg per tablet [Pharmacy Med Name: ACETAMINOPHEN/COD #3 (300/30MG) TAB] 120 tablet 0     Sig: TAKE 1 TABLET BY MOUTH EVERY 6 HOURS AS NEEDED FOR PAIN     Date Last Fill: 12/7/20  Requested [facility]acy[facility]  Submit electronically to pharmacy  Controlled Substance Agreement on file:   Encounter-Level CSA Scan Date - 07/09/2018:    Scan on 7/11/2018 12:40 PM           Encounter-Level CSA Scan Date - 09/08/2016:    Scan on 9/9/2016 11:19 AM        Last office visit:  8/21/20      Rx renewed per Medication Renewal policy  omeprazole

## 2021-06-14 NOTE — TELEPHONE ENCOUNTER
Controlled Substance Refill Request  Medication Name:   Requested Prescriptions     Pending Prescriptions Disp Refills     acetaminophen-codeine (TYLENOL #3) 300-30 mg per tablet [Pharmacy Med Name: ACETAMINOPHEN/COD #3 (300/30MG) TAB] 120 tablet 0     Sig: TAKE 1 TABLET BY MOUTH EVERY 6 HOURS AS NEEDED FOR PAIN     Date Last Fill: 1/5/21  Requested [facility]  Submit electronically to pharmacy  Controlled Substance Agreement on file:   Encounter-Level CSA Scan Date - 07/09/2018:    Scan on 7/11/2018 12:40 PM           Encounter-Level CSA Scan Date - 09/08/2016:    Scan on 9/9/2016 11:19 AM        Last office visit:  8/21/20

## 2021-06-14 NOTE — TELEPHONE ENCOUNTER
Refill Approved    Rx renewed per Medication Renewal Policy. Medication was last renewed on 12/31/19.    Alyce Baker, [facility] Triage/Med Refill 1/4/2021     Requested Prescriptions   Pending Prescriptions Disp Refills     OXcarbazepine (TRILEPTAL) 150 MG tablet [Pharmacy Med Name: OXCARBAZEPINE 150MG TABLETS] 270 tablet 3     Sig: TAKE 3 TABLETS(450 MG) BY MOUTH AT BEDTIME       Oxcarbazepine Refill Protocol  Passed - 1/1/2021  1:13 PM        Passed - PCP or prescribing provider visit in last 12 months or next 3 months     Last office visit with prescriber/PCP: 8/21/2020 Ivan Driver MD OR same dept: 8/21/2020 Ivan Driver MD OR same specialty: 8/21/2020 Ivan Driver MD  Last physical: Visit date not found Last MTM visit: Visit date not found   Next visit within 3 mo: Visit date not found  Next physical within 3 mo: Visit date not found  Prescriber OR PCP: Ivan Driver MD  Last diagnosis associated with med order: 1. Facial neuralgia  - OXcarbazepine (TRILEPTAL) 150 MG tablet [Pharmacy Med Name: OXCARBAZEPINE 150MG TABLETS]; TAKE 3 TABLETS(450 MG) BY MOUTH AT BEDTIME  Dispense: 270 tablet; Refill: 3    If protocol passes may refill for 12 months if within 3 months of last provider visit (or a total of 15 months).             Passed - Sodium in last 12 months     Lab Results   Component Value Date    Sodium 139 08/21/2020             Passed - Serum Creatinine in last 12 months     Creatinine   Date Value Ref Range Status   08/21/2020 0.75 0.60 - 1.10 mg/dL Final

## 2021-06-14 NOTE — TELEPHONE ENCOUNTER
Refill request for medication: Tylenol #3  Last visit addressing this medication: 8/21/20  Follow up plan none listed     Last refill on 1/5/21, quantity #120   CSA completed none on file    checked  02/01/21, last dispensed refill 1/5/21    Appointment: none scheduled      ALVARO Aranda

## 2021-06-15 NOTE — TELEPHONE ENCOUNTER
"Refill request for medication: Tylenol #3   Last visit addressing this medication: 08/21/2020  Follow up plan was recommended to follow up with PCP at last visit in \"a few weeks\".    Last refill on 02/02/21, quantity 120   CSA completed current one not on file   checked  Do not have access     Appointment: Please specify if you would wish for pt to follow up     Kyleigh Smallwood LPN      "

## 2021-06-15 NOTE — TELEPHONE ENCOUNTER
RN cannot approve Refill Request    RN can NOT refill this medication med is not covered by policy/route to provider. Last office visit: 1/6/2020 Mara Murillo MD Last Physical: Visit date not found Last MTM visit: Visit date not found Last visit same specialty: 8/21/2020 Ivan Driver MD.  Next visit within 3 mo: Visit date not found  Next physical within 3 mo: Visit date not found      Rea Yeh, [facility] Triage/Med Refill 3/1/2021    Requested Prescriptions   Pending Prescriptions Disp Refills     acetaminophen-codeine (TYLENOL #3) 300-30 mg per tablet [Pharmacy Med Name: ACETAMINOPHEN/COD #3 (300/30MG) TAB] 120 tablet 0     Sig: TAKE 1 TABLET BY MOUTH EVERY 6 HOURS AS NEEDED FOR PAIN       Controlled Substances Refill Protocol Failed - 3/1/2021  1:16 PM        Failed - Route all Controlled Substance Requests to Provider        Failed - Patient has controlled substance agreement in past 12 months     Encounter-Level CSA Scan Date - 07/09/2018:    Scan on 7/11/2018 12:40 PM           Encounter-Level CSA Scan Date - 09/08/2016:    Scan on 9/9/2016 11:19 AM               Passed - Visit with PCP or prescribing provider visit in past 12 months      Last office visit with prescriber/PCP: 1/6/2020 Mara Murillo MD OR same dept: 8/21/2020 Ivan Driver MD OR same specialty: 8/21/2020 Ivan Driver MD Last physical: Visit date not found Last MTM visit: Visit date not found    Next visit within 3 mo: Visit date not found  Next physical within 3 mo: Visit date not found  Prescriber OR PCP: Mara Murillo MD  Last diagnosis associated with med order: 1. Trigeminal nerve disorder  - acetaminophen-codeine (TYLENOL #3) 300-30 mg per tablet [Pharmacy Med Name: ACETAMINOPHEN/COD #3 (300/30MG) TAB]; TAKE 1 TABLET BY MOUTH EVERY 6 HOURS AS NEEDED FOR PAIN  Dispense: 120 tablet; Refill: 0

## 2021-06-15 NOTE — PROGRESS NOTES
Scheduled Follow Up Call: Attempt 1  Care Guide called and left a message for the patient.  If the patient is returning my call, please transfer her to me, Dora Soto, at [phone].    Reviewed the patient's chart and noticed the patient has not scheduled a follow up with Dr. Hansen, her Pulmonologist yet    Plan for Next Outreach:  1. Patient was enrolled with [facility] on 1/23/15---discuss scheduling an RN Assessment if she is not going to be moving to maintenance  2. Follow up on her dental needs (Pt discussed dental needs with Dr. Roth on 7/10/17)--Has she followed up with the dentist?    Pending Appointments:  None  ___________________  Planned Outreach Frequency: monthly  Preferred Phone Number: [phone]    RN Assessment Date: DUE (May be moving to Maintenance in February)  Goal Setting Date: 1/23/15    Chronic Medical Diagnosis/Physical Health Needs:  Osteopenia  Hyperlipidemia  Migraine Headache  Trigeminal Neuralgia  Acute Respiratory Failure  Underweight  Protein-Calorie Malnutrition, Severe  Shortness of Breath  COPD    Medications:  Medication Management: Unknown  Pharmacy: [facility]    Preventative Measures:  Medical Insurance: Blue Cross Blue Shield Platinum Blue  ID: [id number]    Medicare Part A and B  ID: [id number]    Annual Physical Exam: DUE  Mammogram: 1/18/06 DUE  Dexa Scan: DUE  Colonoscopy: 5/3/06 DUE  Fall Risk Assessment: 9/8/16 DUE  Advance Directives: On File  Flu Shot: DUE  Td Shot: 11/7/07 DUE  Zostavax Shot: 11/7/07  PCV-13 Shot: 10/29/15  Pneumovax Shot: 10/10/14    Mental Health Diagnosis/Needs:   Anxiety    Mental Health Provider:   Unknown    Hygiene:  Independent    Rest/Sleep:  Unknown    Nutrition:  Meals on Wheels  Boost once a day  Has had at least 6 teeth extracted, may have had more    Substance/CD:  Use of Cigarettes: Quit in 2014  Second Hand Smoke Exposure: None  Alcohol Use: None  Street Drugs: None    Family  Planning:  NA    Employment/Education:  Unknown    Housing:  House    Interpersonal:      Household Members:  Self    Safety:  Hoarding tendencies    Daily Activities/Exercise:  Scrapbooking    Social Network:  Daughter lives close by  2 sisters live in the area    Restoration/Spiritual:  Unknown    Transportation:  Does not drive    Financial/Expenses:  Unknown    Understanding of Health and Wellbeing/Barriers:  Unknown    Current Services/Support:  Meals on Wheels

## 2021-06-15 NOTE — PROGRESS NOTES
Introduced myself to the patient and explained I am her new Care Guide    Energy:  Patient states her energy is good  No concerns at this time  Eating anytime she enters the kitchen  Receives Meals on Wheels  Works on getting more sleep each night  Drinks Boost once a day  Patient agreed this goal is complete    Lung Condition:  Last office visit with Pulmonologist, Dr. Hansen, was 2/1/17  At that appointment she was told to follow up in 9 months  Follow up is overdue  Discussed getting the follow up scheduled and seeing if there are any new recommendations Dr. Hansen provides  Patient states she has the phone number for Dr. Hansen's office and will call to schedule the appointment    New Goals:  Patient states she doesn't have any other goals she would like to work on at this time    Maintenance:  Began to discuss possibly moving her to maintenance once she completes the follow up with Dr. Hansen, if there are no recommendations made beyond following up again  Meals on Wheels arrived at the door so the patient had to end the call    Plan for Next Outreach:  1. Patient was enrolled with [facility] on 1/23/15---discuss scheduling an RN Assessment if she is not going to be moving to maintenance  2. Follow up on her dental needs--has she had more than 6 teeth extracted?  Has she followed up with the dentist?    I reviewed the patient's chart and updated the Care Team    Pending Appointments:  None  ___________________  Planned Outreach Frequency: monthly  Preferred Phone Number: [phone]    RN Assessment Date: DUE (May be moving to Maintenance in February)  Goal Setting Date: 1/23/15    Chronic Medical Diagnosis/Physical Health Needs:  Osteopenia  Hyperlipidemia  Migraine Headache  Trigeminal Neuralgia  Acute Respiratory Failure  Underweight  Protein-Calorie Malnutrition, Severe  Shortness of Breath  COPD    Medications:  Medication Management: Unknown  Pharmacy: [facility]    Preventative Measures:  Medical  Insurance: Blue Cross Blue Shield Platinum Blue  ID: [id number]    Medicare Part A and B  ID: [id number]    Annual Physical Exam: DUE  Mammogram: 1/18/06 DUE  Dexa Scan: DUE  Colonoscopy: 5/3/06 DUE  Fall Risk Assessment: 9/8/16 DUE  Advance Directives: On File  Flu Shot: DUE  Td Shot: 11/7/07 DUE  Zostavax Shot: 11/7/07  PCV-13 Shot: 10/29/15  Pneumovax Shot: 10/10/14    Mental Health Diagnosis/Needs:   Anxiety    Mental Health Provider:   Unknown    Hygiene:  Independent    Rest/Sleep:  Unknown    Nutrition:  Meals on Wheels  Boost once a day  Has had at least 6 teeth extracted, may have had more    Substance/CD:  Use of Cigarettes: Quit in 2014  Second Hand Smoke Exposure: None  Alcohol Use: None  Street Drugs: None    Family Planning:  NA    Employment/Education:  Unknown    Housing:  House    Interpersonal:      Household Members:  Self    Safety:  Hoarding tendencies    Daily Activities/Exercise:  Scrapbooking    Social Network:  Daughter lives close by  2 sisters live in the area    Hinduism/Spiritual:  Unknown    Transportation:  Does not drive    Financial/Expenses:  Unknown    Understanding of Health and Wellbeing/Barriers:  Unknown    Current Services/Support:  Meals on Wheels

## 2021-06-15 NOTE — PROGRESS NOTES
Scheduled Follow Up Call: Attempt 2  Care Guide called and left a message for the patient.  If the patient is returning my call, please transfer her to me, Dora Soto, at [phone].    Plan for Next Outreach:  Patient was enrolled with [facility] on 1/23/15---discuss scheduling an RN Assessment    Pending Appointments:  None  ___________________  Planned Outreach Frequency: TBD  Preferred Phone Number: [phone]

## 2021-06-15 NOTE — PROGRESS NOTES
Scheduled Follow Up Call: Attempt 1  Care Guide called and left a message for the patient.  If the patient is returning my call, please transfer her to me, Dora Soto, at [phone].    Pending Appointments:  None  ___________________  Planned Outreach Frequency: TBD

## 2021-06-16 PROBLEM — E83.52 HYPERCALCEMIA: Status: ACTIVE | Noted: 2019-02-06

## 2021-06-16 PROBLEM — F32.0 MILD MAJOR DEPRESSION (H): Status: ACTIVE | Noted: 2021-05-04

## 2021-06-16 PROBLEM — F41.9 ANXIETY: Status: ACTIVE | Noted: 2020-01-06

## 2021-06-16 NOTE — PROGRESS NOTES
Scheduled Follow Up Call: Attempt 2  Care Guide called and left a message for the patient.  If the patient is returning my call, please transfer her to me, Dora Soto, at [phone].    Reviewed the patient's chart and noticed the patient has not scheduled a follow up with Dr. Hansen, her Pulmonologist yet    Plan for Next Outreach:  1. Patient was enrolled with [facility] on 1/23/15---discuss scheduling an RN Assessment if she is not going to be moving to maintenance  2. Follow up on her dental needs (Pt discussed dental needs with Dr. Roth on 7/10/17)--Has she followed up with the dentist?    Pending Appointments:  None  ___________________  Planned Outreach Frequency: monthly  Preferred Phone Number: [phone]    RN Assessment Date: DUE   Goal Setting Date: 1/23/15    Chronic Medical Diagnosis/Physical Health Needs:  Osteopenia  Hyperlipidemia  Migraine Headache  Trigeminal Neuralgia  Acute Respiratory Failure  Underweight  Protein-Calorie Malnutrition, Severe  Shortness of Breath  COPD    Medications:  Medication Management: Unknown  Pharmacy: [facility]    Preventative Measures:  Medical Insurance: Blue Cross Blue Shield Platinum Blue  ID: [id number]    Medicare Part A and B  ID: [id number]    Annual Physical Exam: DUE  Mammogram: 1/18/06 DUE  Dexa Scan: DUE  Colonoscopy: 5/3/06 DUE  Fall Risk Assessment: 9/8/16 DUE  Advance Directives: On File  Flu Shot: DUE  Td Shot: 11/7/07 DUE  Zostavax Shot: 11/7/07  PCV-13 Shot: 10/29/15  Pneumovax Shot: 10/10/14    Mental Health Diagnosis/Needs:   Anxiety    Mental Health Provider:   Unknown    Hygiene:  Independent    Rest/Sleep:  Unknown    Nutrition:  Meals on Wheels  Boost once a day  Has had at least 6 teeth extracted, may have had more    Substance/CD:  Use of Cigarettes: Quit in 2014  Second Hand Smoke Exposure: None  Alcohol Use: None  Street Drugs: None    Family  Planning:  NA    Employment/Education:  Unknown    Housing:  House    Interpersonal:      Household Members:  Self    Safety:  Hoarding tendencies    Daily Activities/Exercise:  Scrapbooking    Social Network:  Daughter lives close by  2 sisters live in the area    Church/Spiritual:  Unknown    Transportation:  Does not drive    Financial/Expenses:  Unknown    Understanding of Health and Wellbeing/Barriers:  Unknown    Current Services/Support:  Meals on Wheels

## 2021-06-16 NOTE — PROGRESS NOTES
Julisa Chaney is a 75 y.o. female who is being evaluated via a billable telephone visit.      What phone number would you like to be contacted at? [phone]  How would you like to obtain your AVS? AVS Preference: Mail a copy.    Assessment & Plan     Julisa was seen today for cough and chest congestion.    Diagnoses and all orders for this visit:    Cough  -     azithromycin (ZITHROMAX) 250 MG tablet; Take 500 mg (2 x 250 mg tablets) on day 1 followed by 250 mg (1 tablet) on days 2-5.  She has been having cough for about 2 weeks now.  And has had postnasal drainage.  I think that it is appropriate at this point to treat her with antibiotics.  I did encourage her to make sure to drink a lot of fluid and keep hydrated.  Also to take some cough medicine which she can get over-the-counter as that will be helpful as well.  If there is no improvement or symptoms get worse I encouraged her to come in for follow-up.   :[id number]}     No follow-ups on file.    Phuc Ahn MD  [facility]   Julisa Chaney is 75 y.o. and presents today for the following health issues   HPI   Chest cold for 2 weeks with coughing, fatigued and runny nose.  As noted waking up and being very congested.  She has no fevers and no chills.  There have been ear pain as well as sore throat at the onset of her symptoms but not anymore.  She is also complaining of having intermittent but questionable wheezing. Cough is said to be non-productive.  But difficult to cough out, has no chest pain or shortness of breath.  Has some mild headache.  Changes in appetite also noted.    Past Medical History:   Diagnosis Date     High cholesterol      Migraines      Sepsis (H) 8/10/2020     Past Surgical History:   Procedure Laterality Date     HYSTERECTOMY       NV COLONOSCOPY FLX DX W/COLLJ SPEC WHEN PFRMD N/A 8/13/2020    Procedure: COLONOSCOPY WITH POLYPECTOMY;  Surgeon: Nathan Smalls MD;   Location: [facility] OR;  Service: Gastroenterology     SD ESOPHAGOGASTRODUODENOSCOPY TRANSORAL DIAGNOSTIC N/A 2020    Procedure: ESOPHAGOGASTRODUODENOSCOPY (EGD);  Surgeon: Nathan Smalls MD;  Location: [facility] OR;  Service: Gastroenterology     SD ESOPHAGOGASTRODUODENOSCOPY TRANSORAL DIAGNOSTIC N/A 2020    Procedure: ESOPHAGOGASTRODUODENOSCOPY (EGD), PYLORIC DILATION;  Surgeon: Nathan Smalls MD;  Location: [facility] OR;  Service: Gastroenterology     Social History     Socioeconomic History     Marital status:      Spouse name: Not on file     Number of children: Not on file     Years of education: Not on file     Highest education level: Not on file   Occupational History     Not on file   Social Needs     Financial resource strain: Not on file     Food insecurity     Worry: Not on file     Inability: Not on file     Transportation needs     Medical: Not on file     Non-medical: Not on file   Tobacco Use     Smoking status: Former Smoker     Packs/day: 1.00     Years: 50.00     Pack years: 50.00     Quit date: 2014     Years since quittin.3     Smokeless tobacco: Never Used   Substance and Sexual Activity     Alcohol use: No     Drug use: No     Sexual activity: Not on file   Lifestyle     Physical activity     Days per week: Not on file     Minutes per session: Not on file     Stress: Not on file   Relationships     Social connections     Talks on phone: Not on file     Gets together: Not on file     Attends Rastafari service: Not on file     Active member of club or organization: Not on file     Attends meetings of clubs or organizations: Not on file     Relationship status: Not on file     Intimate partner violence     Fear of current or ex partner: Not on file     Emotionally abused: Not on file     Physically abused: Not on file     Forced sexual activity: Not on file   Other Topics Concern     Not on file   Social History Narrative    Lives alone in the  "house, relay in TV dinner    grandkids help with house maintenance    Daughter lives close by.    Mara Murillo MD 7/9/2018 1:49 PM       Family History   Problem Relation Age of Onset     Cancer Father      Testicular cancer Grandchild 17     Breast cancer Mother      Breast cancer Sister      Breast cancer Maternal Aunt      Breast cancer Sister      Allergies   Allergen Reactions     Contrast [Iohexol] Rash     Noticed during 08/2020 admission. Received IV contrast on 8/5     Chocolate Headache     Mirtazapine Other (See Comments)     \"Needles to the face\"     Penicillins Rash     Current Outpatient Medications   Medication Sig Dispense Refill     acetaminophen-codeine (TYLENOL #3) 300-30 mg per tablet TAKE 1 TABLET BY MOUTH EVERY 6 HOURS AS NEEDED FOR PAIN 120 tablet 0     cholecalciferol, vitamin D3, 2,000 unit Tab Take 1 tablet (2,000 Units total) by mouth daily. One tab daily 90 tablet 4     DAILY-DAVID tablet TAKE 1 TABLET BY MOUTH DAILY 100 tablet 3     ferrous sulfate 325 (65 FE) MG tablet Take 1 tablet (325 mg total) by mouth daily with breakfast. 30 tablet 0     nortriptyline (PAMELOR) 25 MG capsule 2 cap twice daily 360 capsule 3     omeprazole (PRILOSEC) 40 MG capsule TAKE 1 CAPSULE(40 MG) BY MOUTH DAILY BEFORE BREAKFAST 90 capsule 1     OXcarbazepine (TRILEPTAL) 150 MG tablet TAKE 3 TABLETS(450 MG) BY MOUTH AT BEDTIME 270 tablet 2     polyvinyl alcohol (LIQUIFILM TEARS) 1.4 % ophthalmic solution Administer 2 drops to both eyes as needed for dry eyes. 15 mL 0     topiramate (TOPAMAX) 50 MG tablet TAKE 1 TABLET BY MOUTH EVERY DAY 90 tablet 2     azithromycin (ZITHROMAX) 250 MG tablet Take 500 mg (2 x 250 mg tablets) on day 1 followed by 250 mg (1 tablet) on days 2-5. 6 tablet 0     No current facility-administered medications for this visit.        Review of Systems  Review of system is as above, she has no skin rash.  No pain currently and no lightheadedness or vertigo.  She still has a fair appetite.  " Otherwise negative.      Objective    Vitals - Patient Reported  Weight (Patient Reported): 110 lb (49.9 kg)    Physical Exam  Visit was with a phone as such there was no examination.  About I could hear intermittent coughing and she sounded congested.          Phone call duration: 10 minutes

## 2021-06-16 NOTE — TELEPHONE ENCOUNTER
Last filled 03/02/21, no refills.     No up to date CSA and  not working for me at this time.    Ambulatory

## 2021-06-16 NOTE — TELEPHONE ENCOUNTER
Controlled Substance Refill Request  Medication Name:   Requested Prescriptions     Pending Prescriptions Disp Refills     acetaminophen-codeine (TYLENOL #3) 300-30 mg per tablet [Pharmacy Med Name: ACETAMINOPHEN/COD #3 (300/30MG) TAB] 120 tablet 0     Sig: TAKE 1 TABLET BY MOUTH EVERY 6 HOURS AS NEEDED FOR PAIN     Date Last Fill: 3/2/21  Requested Pharmacy: [facility]  Submit electronically to pharmacy  Controlled Substance Agreement on file:   Encounter-Level CSA Scan Date - 07/09/2018:    Scan on 7/11/2018 12:40 PM           Encounter-Level CSA Scan Date - 09/08/2016:    Scan on 9/9/2016 11:19 AM        Last office visit:  3/23/21

## 2021-06-16 NOTE — PROGRESS NOTES
Pulmonology Follow Up:  Patient has not scheduled a follow up with Dr. Hansen yet  Questioned if there was anything preventing her from getting it scheduled and she said no  She stated she has the phone number to the office  Offered to call over there together to get the appointment scheduled; however, she declined    Dental Work:  Patient stated she urgently needs to get dental work done  She already has a dentist and has the phone number to their office  Began a new goal around this  She will call to schedule    RN Assessment:  Explained she enrolled with [facility] in 2015 and she is due for an intake with our nurse, Lakeshia  Coordinated an appointment for 3/2/18 at 11:00  Informed her she will receive an automated reminder call to assist in reminding her of the appointment    Transportation:  Patient mentioned her daughter has to drop children off at school and pick them up  Appointments need to be scheduled in between this time period  I am guessing the patient's daughter is the one who drives the patient to and from appointments  It may be beneficial to discuss additional transportation options if transportation is a barrier to appointments  I will need to discuss this further with the patient in the future    ROIs:  Informed her she is due for some updated releases of information  I will fill them out and give them to ABRIL Asher, to have the patient sign on 3/2/18  I have filled out the [place] Dept of Human Services [place] and the Patient/Family Contact Form for the patient's emergency contacts listed in her demographic page    Plan for Next Outreach:  1.  Verify she has scheduled an appointment to see Dr. Hansen.  2.  Discuss if transportation to appointments is a barrier for her    Pending Appointments:  3/2/18 at 11:00 with Lakeshia Robison RN  ___________________  Planned Outreach Frequency: monthly  Preferred Phone Number: [phone]    RN Assessment Date: DUE Scheduled for 3/2/18  Goal Setting Date: 1/23/15    Chronic Medical  Diagnosis/Physical Health Needs:  Osteopenia  Hyperlipidemia  Migraine Headache  Trigeminal Neuralgia  Acute Respiratory Failure  Underweight  Protein-Calorie Malnutrition, Severe  Shortness of Breath  COPD    Medications:  Medication Management: Unknown  Pharmacy: [facility]    Preventative Measures:  Medical Insurance: Blue Cross Blue Shield Platinum Blue  ID: [id number]    Medicare Part A and B  ID: [id number]    Annual Physical Exam: DUE  Mammogram: 1/18/06 DUE  Dexa Scan: DUE  Colonoscopy: 5/3/06 DUE  Fall Risk Assessment: 9/8/16 DUE  Advance Directives: On File  Flu Shot: DUE  Td Shot: 11/7/07 DUE  Zostavax Shot: 11/7/07  PCV-13 Shot: 10/29/15  Pneumovax Shot: 10/10/14    Mental Health Diagnosis/Needs:   Anxiety    Mental Health Provider:   Unknown    Hygiene:  Independent    Rest/Sleep:  Unknown    Nutrition:  Meals on Wheels  Boost once a day  Has had at least 6 teeth extracted, may have had more    Substance/CD:  Use of Cigarettes: Quit in 2014  Second Hand Smoke Exposure: None  Alcohol Use: None  Street Drugs: None    Family Planning:  NA    Employment/Education:  Unknown    Housing:  House    Interpersonal:      Household Members:  Self    Safety:  Hoarding tendencies    Daily Activities/Exercise:  Scrapbooking    Social Network:  Daughter lives close by  2 sisters live in the area    Hindu/Spiritual:  Unknown    Transportation:  Does not drive  May depend on her daughter to provide transportation to appointments--this may be a barrier     Financial/Expenses:  Unknown    Understanding of Health and Wellbeing/Barriers:  Unknown    Current Services/Support:  Meals on Wheels

## 2021-06-16 NOTE — PROGRESS NOTES
Pt scheduled for RN assessment on 3/2/18 at 11:00 am. Pt on my schedule this morning. At 11:00 am, pt no longer on my schedule. Checked with Herson John. Pt had called [facility] and cancelled the appointment.   I had reviewed pt's chart, and preped for appointment. Discussed pt's possible needs with Herson John. Requested that Dora reschedule appointment.

## 2021-06-17 ENCOUNTER — COMMUNICATION - HEALTHEAST (OUTPATIENT)
Dept: FAMILY MEDICINE | Facility: CLINIC | Age: 76
End: 2021-06-17

## 2021-06-17 DIAGNOSIS — F51.05 INSOMNIA SECONDARY TO ANXIETY: ICD-10-CM

## 2021-06-17 DIAGNOSIS — F32.0 MILD MAJOR DEPRESSION (H): ICD-10-CM

## 2021-06-17 DIAGNOSIS — F41.9 INSOMNIA SECONDARY TO ANXIETY: ICD-10-CM

## 2021-06-17 RX ORDER — QUETIAPINE FUMARATE 25 MG/1
TABLET, FILM COATED ORAL
Qty: 30 TABLET | Refills: 0 | Status: SHIPPED | OUTPATIENT
Start: 2021-06-17 | End: 2022-02-07

## 2021-06-17 NOTE — TELEPHONE ENCOUNTER
Please start  prior authorization  As she has allergy to remeron    Mara Murillo MD 5/11/2021 7:29 AM

## 2021-06-17 NOTE — PROGRESS NOTES
Care Guide called the patient for Clinic Care Coordination outreach follow up on goals and action steps.    Dentist:   Has not scheduled yet  States she is scared about getting her teeth pulled    Follow Up with Dr. Hansen, Pulmonologist:  Has not scheduled yet    Phone call was brief due to patient stating her lunch was just delivered from meals on wheels and she wanted to eat it while it was still warm  Discussed I see she has an appointment with Dr. Roth on 5/10/18 at 11:20  I informed her I can place her on my schedule as well and we can meet in the clinic  Patient agreed with this plan    RN Assessment History:  3/2/18--cancelled by patient    Plan for Next Outreach:  1.  Discuss scheduling an appointment to see Dr. Hansen and her dentist  2.  Discuss if transportation to appointments is a barrier for her  3.  Discuss rescheduling the RN Assessment    Pending Appointments:  5/10/18 at 11:20 with Dr. Roth for Physical and Dora, Clinic Care Guide  ___________________  Next Outreach Date: 5/10/18  Planned Outreach Frequency: monthly  Preferred Phone Number: [phone]    RN Assessment Date: DUE   Goal Setting Date: 1/23/15    Chronic Medical Diagnosis/Physical Health Needs:  Osteopenia  Hyperlipidemia  Migraine Headache  Trigeminal Neuralgia  Acute Respiratory Failure  Underweight  Protein-Calorie Malnutrition, Severe  Shortness of Breath  COPD    Medications:  Medication Management: Unknown  Pharmacy: [facility]    Preventative Measures:  Medical Insurance: Blue Cross Blue Shield Platinum Blue  ID: [id number]    Medicare Part A and B  ID: [id number]    Annual Physical Exam: DUE  Mammogram: 1/18/06 DUE  Dexa Scan: DUE  Colonoscopy: 5/3/06 DUE  Fall Risk Assessment: 9/8/16 DUE  Advance Directives: On File  Flu Shot: DUE  Td Shot: 11/7/07 DUE  Zostavax Shot: 11/7/07  PCV-13 Shot: 10/29/15  Pneumovax Shot: 10/10/14    Mental Health Diagnosis/Needs:   Anxiety    Mental Health Provider:    Unknown    Hygiene:  Independent    Rest/Sleep:  Unknown    Nutrition:  Meals on Wheels  Boost once a day  Has had at least 6 teeth extracted, may have had more    Substance/CD:  Use of Cigarettes: Quit in 2014  Second Hand Smoke Exposure: None  Alcohol Use: None  Street Drugs: None    Family Planning:  NA    Employment/Education:  Unknown    Housing:  House    Interpersonal:      Household Members:  Self    Safety:  Hoarding tendencies    Daily Activities/Exercise:  Scrapbooking    Social Network:  Daughter lives close by  2 sisters live in the area    Episcopalian/Spiritual:  Unknown    Transportation:  Does not drive  May depend on her daughter to provide transportation to appointments--this may be a barrier     Financial/Expenses:  Unknown    Understanding of Health and Wellbeing/Barriers:  Unknown    Current Services/Support:  Meals on Wheels

## 2021-06-17 NOTE — TELEPHONE ENCOUNTER
Patient calling and is wondering if prescription can be refilled now since it is closer to 30 days.      Clementine Lang

## 2021-06-17 NOTE — TELEPHONE ENCOUNTER
Central PA team  [phone]  Pool: HE PA MED ([id number])          PA has been initiated.       PA form completed and faxed insurance via Cover My Meds     Key:  [id number]     Medication:  QUETIAPINE 25MG     Insurance:  [employer]        Response will be received via fax and may take up to 5-10 business days depending on plan

## 2021-06-17 NOTE — PROGRESS NOTES
Assessment/Plan      Annual Wellness Visit      Julisa was seen today for annual wellness visit.    Diagnoses and all orders for this visit:    Encounter for general adult medical examination with abnormal findings    Encounter for screening for diabetes mellitus  -     Glucose    Encounter for screening for lipoid disorders  -     Lipid [place], FASTING; Future    Postmenopausal  -     DXA Bone Density Scan; Future    Osteopenia    Mild major depression (H)  -     QUEtiapine (SEROQUEL) 25 MG tablet; Take 1 tablet (25 mg total) by mouth at bedtime.    Trigeminal neuralgia    Iron deficiency anemia due to chronic blood loss  -     HM2(CBC w/o Differential)  -     ferrous sulfate 325 (65 FE) MG tablet; Take 1 tablet (325 mg total) by mouth daily with breakfast.    Abnormal chest CT  -     CT Chest Without Contrast; Future    Vitamin D deficiency  -     cholecalciferol, vitamin D3, 50 mcg (2,000 unit) Tab; Take 1 tablet (2,000 Units total) by mouth daily. One tab daily    Trigeminal nerve disorder  Comments:  chronic pain left side of the face , which  affecting her appetite , continue topamax one tab daily   Orders:  -     multivitamin (DAILY-DAVID) per tablet; Take 1 tablet by mouth daily.    Dry eyes  -     polyvinyl alcohol (LIQUIFILM TEARS) 1.4 % ophthalmic solution; Administer 2 drops to both eyes as needed for dry eyes.    Pyloric ulcer, chronic  -     Comprehensive Metabolic Panel    Insomnia secondary to anxiety  -     QUEtiapine (SEROQUEL) 25 MG tablet; Take 1 tablet (25 mg total) by mouth at bedtime.    Tooth pain  -     Ambulatory referral to Dentistry    Weight loss, abnormal  -     Comprehensive Metabolic Panel    Atopic dermatitis, mild  -     desonide (DESOWEN) 0.05 % cream; Apply to affected area 2 times daily        I have had an Advance Directives discussion with the patient.  The following are part of a depression follow up plan for the patient:  implementation of measures to provide psychological  support, mental health promotion assessment and management of mental health treatment  The following low BMI interventions were performed this visit: dietary management education, guidance, and counseling  The following health maintenance schedule was reviewed with the patient and provided in printed form in the after visit summary:   Health Maintenance   Topic Date Due     DEXA SCAN  Never done     COVID-19 Vaccine (1) Never done     ZOSTER VACCINES (2 of 3) 01/02/2008     INFLUENZA VACCINE RULE BASED (Season Ended) 08/01/2021     MEDICARE ANNUAL WELLNESS VISIT  05/04/2022     FALL RISK ASSESSMENT  05/04/2022     LIPID  05/10/2023     ADVANCE CARE PLANNING  05/04/2026     TD 18+ HE  05/10/2028     DEPRESSION ACTION PLAN  Completed     Pneumococcal Vaccine: 65+ Years  Completed     Pneumococcal Vaccine: Pediatrics (0 to 5 Years) and At-Risk Patients (6 to 64 Years)  Aged Out     HEPATITIS B VACCINES  Aged Out     HEPATITIS C SCREENING  Discontinued      Subjective:      Julisa Chaney is a 76 y.o. female who presents for a Subsequent Annual Wellness Visit.  Patient here to follow-up on her chronic medical problems and also new concern of very poor sleep continue to lose weight.  Patient started losing weight last year after she had a peptic ulcer disease with chronic blood loss did follow other gastroenterologist  Currently on PPI which seems to be helping the symptoms able to eat most of her time cereal and milk which seems to be most tolerated food item try to eat eggs and some chicken protein.  Not much appetite most of the time.  Also struggling with lot of tooth pain.  Patient with known history of trigeminal neuralgia for which she takes multiple medication including current #3 feels whenever she tried to brush her teeth on the left side the nerve get triggers and causes shooting pain.  Her main goal is to get all the teeth out so she can just remove the dentures and brush them.  As her original teeth are not  "doing much for chewing.  Patient already had teeth removed from the lower half but most of the upper half is her original.  Patient feel her current dentist is not listening to her or doing anything regarding her problem.  Also like to breast exam done as she is not a candidate for mammogram anymore complaining of some crusting of the left nipple but no other changes in the breast or any abnormal discharge patient does have some atopic dermatitis on the hand and chest area which she using Vaseline right now  Like to get refill done on all her medications  Currently lives with her grandson who is helping with cooking and cleaning.  Also willing to do schedule for Covid vaccine today  YUE 7 Total Score: 7 (6/15/2020 12:00 PM)  PHQ-9 Total Score: 10 (5/4/2021 11:26 AM)      Activities of Daily Living    In your present state of health, do you have any difficulty performing the following activities?:   Preparing food and eating?: No  Bathing yourself: No  Getting dressed: No  Using the toilet:No  Moving around from place to place: No  In the past year have you fallen or had a near fall?:No    Current exercise habits: some walking      Dietary issues discussed: yes    Current Diet:  trying to eat healthy , recommend increase protein by more protein smoothie    Depression Screen  (Note: if answer to either of the following is \"Yes\", then a more complete depression screening is indicated)   Q1: Over the past two weeks, have you felt down, depressed or hopeless?yes  Q2: Over the past two weeks, have you felt little interest or pleasure in doing things? no     Review of Systems  A 12 point comprehensive review of systems was negative except as noted.     Advanced Directive:  I have had an Advanced Directive discussion with the patient.    Co-Managers and Medical Equipment/Suppliers:  no    The following portions of the patient's history were reviewed and updated as appropriate: allergies, current medications, past family " history, past medical history, past social history, past surgical history, and problem list.      Immunization History   Administered Date(s) Administered     DT (pediatric) 08/30/2000     Hep A, historic 04/07/2010     INFLUENZA,SEASONAL QUAD, PF, =/> 6months 10/10/2014     Influenza [id number], 01/15/2010     Influenza high dose,seasonal,PF, 65+ yrs 10/29/2015, 09/08/2016, 10/15/2018, 01/06/2020     Influenza, Seasonal, Inj PF IIV3 10/09/2010, 09/30/2011, 11/13/2012, 11/21/2013     Influenza, inj, historic,unspecified 11/07/2007, 10/22/2008, 10/02/2009     Pneumo Conj 13-V (2010&after) 10/29/2015     Pneumo Polysac 23-V 10/10/2014     Td, adult adsorbed, PF 05/10/2018     Td,adult,historic,unspecified 11/07/2007     Tdap 11/07/2007     ZOSTER, LIVE 11/07/2007       Immunization status: up to date and documented.    Current Outpatient Medications   Medication Sig Dispense Refill     acetaminophen-codeine (TYLENOL #3) 300-30 mg per tablet TAKE 1 TABLET BY MOUTH EVERY 6 HOURS AS NEEDED FOR PAIN 120 tablet 0     cholecalciferol, vitamin D3, 50 mcg (2,000 unit) Tab Take 1 tablet (2,000 Units total) by mouth daily. One tab daily 90 tablet 4     ferrous sulfate 325 (65 FE) MG tablet Take 1 tablet (325 mg total) by mouth daily with breakfast. 30 tablet 0     multivitamin (DAILY-DVAID) per tablet Take 1 tablet by mouth daily. 100 tablet 3     nortriptyline (PAMELOR) 25 MG capsule 2 cap twice daily 360 capsule 3     omeprazole (PRILOSEC) 40 MG capsule TAKE 1 CAPSULE(40 MG) BY MOUTH DAILY BEFORE BREAKFAST 90 capsule 1     OXcarbazepine (TRILEPTAL) 150 MG tablet TAKE 3 TABLETS(450 MG) BY MOUTH AT BEDTIME 270 tablet 2     topiramate (TOPAMAX) 50 MG tablet TAKE 1 TABLET BY MOUTH EVERY DAY 90 tablet 2     desonide (DESOWEN) 0.05 % cream Apply to affected area 2 times daily 60 g 1     polyvinyl alcohol (LIQUIFILM TEARS) 1.4 % ophthalmic solution Administer 2 drops to both eyes as needed for dry eyes. 15 mL 0     QUEtiapine (SEROQUEL)  25 MG tablet Take 1 tablet (25 mg total) by mouth at bedtime. 30 tablet 0     No current facility-administered medications for this visit.      Past Medical History:   Diagnosis Date     High cholesterol      Migraines      Sepsis (H) 8/10/2020     Past Surgical History:   Procedure Laterality Date     HYSTERECTOMY       [place] COLONOSCOPY FLX DX W/COLLJ SPEC WHEN PFRMD N/A 2020    Procedure: COLONOSCOPY WITH POLYPECTOMY;  Surgeon: Nathan Smalls MD;  [place] [facility] [place]     [place] ESOPHAGOGASTRODUODENOSCOPY TRANSORAL DIAGNOSTIC N/A 2020    Procedure: ESOPHAGOGASTRODUODENOSCOPY (EGD);  Surgeon: Nathan Smalls MD;  Location: [facility] OR;  Service: Gastroenterology     NJ ESOPHAGOGASTRODUODENOSCOPY TRANSORAL DIAGNOSTIC N/A 2020    Procedure: ESOPHAGOGASTRODUODENOSCOPY (EGD), PYLORIC DILATION;  Surgeon: Nathan Smalls MD;  [place] [facility] [place]  Service: [place]       Contrast [iohexol], Chocolate, Mirtazapine, and Penicillins    Family History   Problem Relation Age of Onset     Cancer Father      Testicular cancer Grandchild 17     Breast cancer Mother      Breast cancer Sister      Breast cancer Maternal Aunt      Breast cancer Sister        Social History     Socioeconomic History     Marital status:      Spouse name: Not on file     Number of children: Not on file     Years of education: Not on file     Highest education level: Not on file   Occupational History     Not on file   Social Needs     Financial resource strain: Not on file     Food insecurity     Worry: Not on file     Inability: Not on file     Transportation needs     Medical: Not on file     Non-medical: Not on file   Tobacco Use     Smoking status: Former Smoker     Packs/day: 1.00     Years: 50.00     Pack years: 50.00     Quit date: 2014     Years since quittin.4     Smokeless tobacco: Never Used   Substance and Sexual Activity     Alcohol use: No     Drug  "use: No     Sexual activity: Not on file   Lifestyle     Physical activity     Days per week: Not on file     Minutes per session: Not on file     Stress: Not on file   Relationships     Social connections     Talks on phone: Not on file     Gets together: Not on file     Attends Zoroastrian service: Not on file     Active member of club or organization: Not on file     Attends meetings of clubs or organizations: Not on file     Relationship status: Not on file     Intimate partner violence     Fear of current or ex partner: Not on file     Emotionally abused: Not on file     Physically abused: Not on file     Forced sexual activity: Not on file   Other Topics Concern     Not on file   Social History Narrative    Lives alone in the house, relay in TV dinner    grandkids help with house maintenance    Daughter lives close by.    Mara Murillo MD 7/9/2018 1:49 PM         Social History     Social History Narrative    Lives alone in the house, relay in TV dinner    grandkids help with house maintenance    Daughter lives close by.    Mara Murillo MD 7/9/2018 1:49 PM         Review of Systems  A 12 point comprehensive review of systems was negative except as noted.    Vitals:    05/04/21 1128   BP: 116/62   Patient Site: Left Arm   Patient Position: Sitting   Cuff Size: Adult Small   Pulse: 80   Weight: 113 lb 4.8 oz (51.4 kg)   Height: 5' 4.17\" (1.63 m)         General: Alert, no acute distress.   HEENT: normocephalic conjunctivae are clear, Normal pearly TMs bilaterally without erythema, pus or fluid.     Nose is clear.  Oropharynx is moist and clear,   Neck: supple without adenopathy or thyromegaly.  Lungs: Good aeration bilaterally. Clear to auscultation without wheezes, rales or rhonci.   Breasts: breasts appear normal, no suspicious masses, no skin or nipple changes or axillary nodes.   Heart: regular rate and rhythm, normal S1 and S2, no murmurs  Abdomen: soft and nontender, bowel sounds are present, no " hepatosplenomegaly or mass palpable.  Skin: clear without rash or lesions  Neuro: alert, interactive moving all extremities equally, normal muscle tone in all 4 extremities, deep tendon reflexes 2+ symmetrically at the patella      EKG : not done     Assessment Results 5/4/2021   Activities of Daily Living No help needed   Instrumental Activities of Daily Living 1 - Full function   Mini Cog Total Score 5   Some recent data might be hidden     A Mini Cog score of 0-2 suggests the possibility of dementia, score of 3-5 suggests no dementia    Identified Health Risks:           Mara Murillo MD 5/4/2021 11:31 AM

## 2021-06-17 NOTE — TELEPHONE ENCOUNTER
5-10-21  Reason for Call: prescription     Detailed comments: pt went to fill her:   QUEtiapine (SEROQUEL) 25 MG tablet  And her insurance doesn't cover this.  Pt wants to know if there is a alternate   [facility] [address]    Phone Number Patient can be reached at: Cell number on file:    Telephone Information:   Mobile [phone]       Best Time: anytime    Can we leave a detailed message on this number?: Yes    Call taken on 5/10/2021 at 3:12 PM by Bety Goldman

## 2021-06-17 NOTE — PROGRESS NOTES
Care Guide met with the patient in clinic for Clinic Care Coordination outreach follow up on goals and action steps.    Dentist:   Patient stated she needs to take money out of her 401K in order to pay for the appointment  She knows what she needs to do in order to complete this    Follow Up with Dr. Hansen, Pulmonologist:  Has not scheduled yet  Patient states she is willing to get the appointment scheduled  Discussed meeting with Claudia, Specialty , prior to leaving the clinic today  Patient agreed with this plan    Transportation:  Questioned if transportation is a barrier for her  Patient states her daughter is currently driving her to appointments  Daughter does not currently work  Daughter does have children to  and drop off to school etc  If her daughter gets a job transportation would be a struggle    Discussed applying for [employer]  Patient's  used to utilize [employer]  She would accompany him for the rides as his primary care giver  Patient is familiar with [employer] and was happy with the services provided  I provided her the application and informed her to let me know if she decides she would like to apply    RN Assessment History:  3/2/18--cancelled by patient    Plan for Next Outreach:  1.  Discuss rescheduling the RN Assessment    Pending Appointments:  5/24/18 at 12:45 for Echo @ [facility]  6/12/18 at 1:45 for Mammogram  ___________________  Next Outreach Date: 6/12/18  Planned Outreach Frequency: monthly  Preferred Phone Number: [phone]    RN Assessment Date: DUE   Goal Setting Date: 1/23/15    Chronic Medical Diagnosis/Physical Health Needs:  Osteopenia  Hyperlipidemia  Migraine Headache  Trigeminal Neuralgia  Acute Respiratory Failure  Underweight  Protein-Calorie Malnutrition, Severe  Shortness of Breath  COPD    Medications:  Medication Management: Unknown  Pharmacy: [facility]    Preventative Measures:  Medical Insurance: Blue Cross Blue  Janeen Brunson  ID: [id number]    Medicare Part A and B  ID: [id number]    Annual Physical Exam: 5/10/18  Mammogram: 1/18/06 (Scheduled for 6/12/18)  Dexa Scan: DUE  Colonoscopy: 5/3/06 DUE  Fall Risk Assessment: 9/8/16 DUE  Advance Directives: On File  Flu Shot: DUE  Td Shot: 11/7/07 DUE  Zostavax Shot: 11/7/07  PCV-13 Shot: 10/29/15  Pneumovax Shot: 10/10/14    Mental Health Diagnosis/Needs:   Anxiety    Mental Health Provider:   Unknown    Hygiene:  Independent    Rest/Sleep:  Unknown    Nutrition:  Meals on Wheels  Boost once a day  Has had at least 6 teeth extracted, may have had more    Substance/CD:  Use of Cigarettes: Quit in 2014  Second Hand Smoke Exposure: None  Alcohol Use: None  Street Drugs: None    Family Planning:  NA    Employment/Education:  Unknown    Housing:  House    Interpersonal:      Household Members:  Self    Safety:  Hoarding tendencies    Daily Activities/Exercise:  Scrapbooking    Social Network:  Daughter lives close by  2 sisters live in the area    Church/Spiritual:  Unknown    Transportation:  Does not drive  Daughter drives her to appointments     Financial/Expenses:  Has a 401K    Understanding of Health and Wellbeing/Barriers:  Unknown    Current Services/Support:  Meals on Wheels

## 2021-06-17 NOTE — TELEPHONE ENCOUNTER
Controlled Substance Refill Request  Medication Name:   Requested Prescriptions     Pending Prescriptions Disp Refills     acetaminophen-codeine (TYLENOL #3) 300-30 mg per tablet [Pharmacy Med Name: ACETAMINOPHEN/COD #3 (300/30MG) TAB] 120 tablet 0     Sig: TAKE 1 TABLET BY MOUTH EVERY 6 HOURS AS NEEDED FOR PAIN     Date Last Fill: 4/26/21  Requested [facility]acy: [facility]  Submit electronically to pharmacy  Controlled Substance Agreement on file:   Encounter-Level CSA Scan Date - 07/09/2018:    Scan on 7/11/2018 12:40 PM           Encounter-Level CSA Scan Date - 09/08/2016:    Scan on 9/9/2016 11:19 AM        Last office visit:  5/4/21         Claudia Mcdermott RN, BSN Nurse Triage Advisor 3:54 PM 5/20/2021

## 2021-06-17 NOTE — TELEPHONE ENCOUNTER
Controlled Substance Refill Request  Medication Name:   Requested Prescriptions     Pending Prescriptions Disp Refills     acetaminophen-codeine (TYLENOL #3) 300-30 mg per tablet [Pharmacy Med Name: ACETAMINOPHEN/COD #3 (300/30MG) TAB] 120 tablet 0     Sig: TAKE 1 TABLET BY MOUTH EVERY 6 HOURS AS NEEDED FOR PAIN     Date Last Fill: 3/29/21  Requested Pharmacy: [facility]  Submit electronically to pharmacy  Controlled Substance Agreement on file:   Encounter-Level CSA Scan Date - 07/09/2018:    Scan on 7/11/2018 12:40 PM           Encounter-Level CSA Scan Date - 09/08/2016:    Scan on 9/9/2016 11:19 AM        Last office visit:  3/23/21

## 2021-06-17 NOTE — PROGRESS NOTES
Scheduled Follow Up Call: Attempt 1  Care Guide called and left a message for the patient.  If the patient is returning my call, please transfer her to me, Dora Brittany, at [phone].    RN Assessment History:  3/2/18--cancelled by patient    Plan for Next Outreach:  1.  Verify she has scheduled an appointment to see Dr. Hansen.  2.  Discuss if transportation to appointments is a barrier for her  3.  Discuss rescheduling the RN Assessment    Pending Appointments:  None  ___________________  Next Outreach Date: 4/30/18  Planned Outreach Frequency: monthly  Preferred Phone Number: [phone]    RN Assessment Date: DUE   Goal Setting Date: 1/23/15    Chronic Medical Diagnosis/Physical Health Needs:  Osteopenia  Hyperlipidemia  Migraine Headache  Trigeminal Neuralgia  Acute Respiratory Failure  Underweight  Protein-Calorie Malnutrition, Severe  Shortness of Breath  COPD    Medications:  Medication Management: Unknown  Pharmacy: [facility]    Preventative Measures:  Medical Insurance: Blue Cross Blue Shield Platinum Blue  ID: [id number]    Medicare Part A and B  ID: [id number]    Annual Physical Exam: DUE  Mammogram: 1/18/06 DUE  Dexa Scan: DUE  Colonoscopy: 5/3/06 DUE  Fall Risk Assessment: 9/8/16 DUE  Advance Directives: On File  Flu Shot: DUE  Td Shot: 11/7/07 DUE  Zostavax Shot: 11/7/07  PCV-13 Shot: 10/29/15  Pneumovax Shot: 10/10/14    Mental Health Diagnosis/Needs:   Anxiety    Mental Health Provider:   Unknown    Hygiene:  Independent    Rest/Sleep:  Unknown    Nutrition:  Meals on Wheels  Boost once a day  Has had at least 6 teeth extracted, may have had more    Substance/CD:  Use of Cigarettes: Quit in 2014  Second Hand Smoke Exposure: None  Alcohol Use: None  Street Drugs: None    Family Planning:  NA    Employment/Education:  Unknown    Housing:  House    Interpersonal:      Household Members:  Self    Safety:  Hoarding tendencies    Daily Activities/Exercise:  Scrapbooking    Social  Network:  Daughter lives close by  2 sisters live in the area    Voodoo/Spiritual:  Unknown    Transportation:  Does not drive  May depend on her daughter to provide transportation to appointments--this may be a barrier     Financial/Expenses:  Unknown    Understanding of Health and Wellbeing/Barriers:  Unknown    Current Services/Support:  Meals on Wheels

## 2021-06-17 NOTE — TELEPHONE ENCOUNTER
Telephone Encounter by Vesta Nieto at 5/14/2021 11:12 AM     Author: Vesta Nieto Service: -- Author Type: --    Filed: 5/14/2021 11:12 AM Encounter Date: 5/10/2021 Status: Signed    : Vesta Nieto APPROVED:    Approval start date: 2/12/2021  Approval end date:  5/13/2022    Pharmacy has been notified of approval and will contact patient when medication is ready for pickup.

## 2021-06-17 NOTE — PROGRESS NOTES
Scheduled Follow Up Call: Attempt 2  Care Guide called and left a message for the patient.  If the patient is returning my call, please transfer her to me, Dora Soto, at [phone].    RN Assessment History:  3/2/18--cancelled by patient    Plan for Next Outreach:  1.  Verify she has scheduled an appointment to see Dr. Hansen.  2.  Discuss if transportation to appointments is a barrier for her  3.  Discuss rescheduling the RN Assessment    Pending Appointments:  5/10/18 at 11:20 with Dr. Roth for Physical  ___________________  Next Outreach Date: 5/8/18  Planned Outreach Frequency: monthly  Preferred Phone Number: [phone]    RN Assessment Date: DUE   Goal Setting Date: 1/23/15    Chronic Medical Diagnosis/Physical Health Needs:  Osteopenia  Hyperlipidemia  Migraine Headache  Trigeminal Neuralgia  Acute Respiratory Failure  Underweight  Protein-Calorie Malnutrition, Severe  Shortness of Breath  COPD    Medications:  Medication Management: Unknown  Pharmacy: [facility]    Preventative Measures:  Medical Insurance: Blue Cross Blue Shield Platinum Blue  ID: [id number]    Medicare Part A and B  ID: [id number]    Annual Physical Exam: DUE  Mammogram: 1/18/06 DUE  Dexa Scan: DUE  Colonoscopy: 5/3/06 DUE  Fall Risk Assessment: 9/8/16 DUE  Advance Directives: On File  Flu Shot: DUE  Td Shot: 11/7/07 DUE  Zostavax Shot: 11/7/07  PCV-13 Shot: 10/29/15  Pneumovax Shot: 10/10/14    Mental Health Diagnosis/Needs:   Anxiety    Mental Health Provider:   Unknown    Hygiene:  Independent    Rest/Sleep:  Unknown    Nutrition:  Meals on Wheels  Boost once a day  Has had at least 6 teeth extracted, may have had more    Substance/CD:  Use of Cigarettes: Quit in 2014  Second Hand Smoke Exposure: None  Alcohol Use: None  Street Drugs: None    Family Planning:  NA    Employment/Education:  Unknown    Housing:  House    Interpersonal:      Household Members:  Self    Safety:  Hoarding tendencies    Daily  Activities/Exercise:  Scrapbooking    Social Network:  Daughter lives close by  2 sisters live in the area    Sabianism/Spiritual:  Unknown    Transportation:  Does not drive  May depend on her daughter to provide transportation to appointments--this may be a barrier     Financial/Expenses:  Unknown    Understanding of Health and Wellbeing/Barriers:  Unknown    Current Services/Support:  Meals on Wheels

## 2021-06-17 NOTE — TELEPHONE ENCOUNTER
Refill request for medication: Tylenol #3   Last visit addressing this medication: 03/23/2021  Follow up plan 3  months  Last refill on 03/29/2021, quantity 120   CSA completed Does not have an updated one on file   Date PDMP was last reviewed Never Reviewed     Appointment: Appointment scheduled for 05/02/2021   Appointment recommended at least every 3 months for opioid prescriptions. Appointment recommended at least every 6 months for ADHD medications.    Kyleigh Smallwood LPN

## 2021-06-17 NOTE — PROGRESS NOTES
Assessment and Plan:       1. Routine general medical examination at a health care facility  - [facility](CBC w/o Differential)  - Basic Metabolic Panel  - Lipid Cascade  - Occult Blood(ICT); Future    2. Screen for colon cancer    3. Visit for screening mammogram  - Mammo Screening Bilateral; Future    4. Trigeminal neuralgia  - Drug Abuse 1+, Urine    5. Edema  - Basic Metabolic Panel  - Echo Complete; Future    6. Hearing loss due to cerumen impaction, bilateral    The patient's current medical problems were reviewed.      She is fasting.  We will do labs.  To improve food choices, less of salty foods, processed foods and frozen meals.  Increase physical activity as tolerated.  Slowly pace herself as she can tolerate.  We also discussed about physical therapy but she would like to hold off at this time.  Recheck if her balance issues or edema are worse in the next 4-6 weeks.  Will also add a urine drug screen.  If results are good, she will call us for refill of her Tylenol 3 by the end of this month.  We also removed the cerumen using an ear curette and was able to successfully do so in both ears.  Consider establishing with a new provider in the next month or 2.  Encourage annual physical.  Consider mammogram, DEXA scan.  She would like to do the stool occult blood for now.  We will also provide her with Td.  She was agreeable with the plans.    The following health maintenance schedule was reviewed with the patient and provided in printed form in the after visit summary:   Health Maintenance   Topic Date Due     MAMMOGRAM  01/18/2008     DXA SCAN  04/22/2010     COLONOSCOPY  05/03/2016     INFLUENZA VACCINE RULE BASED (Season Ended) 08/01/2018     FALL RISK ASSESSMENT  05/10/2019     ADVANCE DIRECTIVES DISCUSSED WITH PATIENT  05/10/2023     TD 18+ HE  05/10/2028     PNEUMOCOCCAL POLYSACCHARIDE VACCINE AGE 65 AND OVER  Completed     PNEUMOCOCCAL CONJUGATE VACCINE FOR ADULTS (PCV13 OR PREVNAR)  Completed     ZOSTER  VACCINE  Completed        Subjective:   Chief Complaint: Julisa Chaney is an 73 y.o. female here for an Annual Wellness visit.   HPI: Julisa is here for her physical.  She was last seen by pulmonology in February 2017 and due for recheck in November. Follows them for her COPD.  Denies any recent hemoptysis, cough, fever, shortness of breath.  No recent unexplained weight loss.  Has been having more appetite and weight has been steady.  She has not used her inhaler since December 2017 as she has been feeling good.  She plans on considering a follow-up with them.    Was supposed to see her dentist for dental procedure but she canceled it as she was afraid and she had financial issues.  She will try to reconsider.    Has trigeminal neuralgia and takes her Tylenol 3 2 tablets twice a day.  Denies any ill effects from the medication.  She has stable symptoms and uncontrolled pain in her cheek and jaw.    Has not been able to exercise much during the winter.  She purchase and exercise bike recently but has not started using it.  Mostly for her arms or extremities.  She feels that her balance is off.  She would sometimes moves side to side or leaning forward.  Has not had any falls or fractures.  She would like to increase her exercise before having her balance issues addressed.  Has been trying to do some spring cleaning, going up and down stairs and organizing herself.     She has been having leg edema since the start of this year.  Seems to be associated with processed foods, frozen meals.  Is trying to work on this and doing more home cooking.  She has cut back on chips.  She is trying to decrease her frozen dinners.  Denies any chest pains, shortness of breath.  No urinary symptoms.  She likes her sweets and not much of carbs.    Last mammogram and colonoscopy in 2006.  Recheck colonoscopy in 2016.  DEXA scan in 2004.    She lives on her own.  Her daughter lives around 7 miles and visits her frequently.  They like  to do scrapbooking.  3 grandkids.  One was recently diagnosed with testicular cancer at 17 years of age.    Review of Systems:  Please see above.  The rest of the review of systems are negative for all systems.    Patient Care Team:  Zahra Roth MD as PCP - General (Family Medicine)  Keri Soto CMA as Clinic Care Coordination Care Guide (Clinic Care Coordination)  Marycarmen Hansen MD as Physician (Pulmonary Disease)  Raj King MD as Physician (Neurology)  Meals on Wheels     Patient Active Problem List   Diagnosis     Osteopenia     Hyperlipidemia     Migraine Headache     Trigeminal neuralgia     Acute respiratory failure     Underweight     Protein-calorie malnutrition, severe     Counseling regarding advanced directives and goals of care     Shortness of breath     Past Medical History:   Diagnosis Date     High cholesterol      Migraines      Trigeminal neuralgia       Past Surgical History:   Procedure Laterality Date     OR TOTAL ABDOM HYSTERECTOMY      Description: Total Abdominal Hysterectomy;  Recorded: 08/17/2010;      Family History   Problem Relation Age of Onset     Cancer Father       Social History     Social History     Marital status:      Spouse name: N/A     Number of children: N/A     Years of education: N/A     Occupational History     Not on file.     Social History Main Topics     Smoking status: Former Smoker     Packs/day: 1.00     Years: 50.00     Quit date: 11/16/2014     Smokeless tobacco: Never Used      Comment: stop smoking packet given     Alcohol use No     Drug use: No     Sexual activity: Not on file     Other Topics Concern     Not on file     Social History Narrative    Lives alone      Current Outpatient Prescriptions   Medication Sig Dispense Refill     acetaminophen-codeine (TYLENOL #3) 300-30 mg per tablet Take 1 tablet by mouth every 6 (six) hours as needed for pain. 120 tablet 0     nortriptyline (PAMELOR) 25 MG capsule TAKE 4 CAPSULES (100  "MG    TOTAL) AT BEDTIME 360 capsule 2     OXcarbazepine (TRILEPTAL) 150 MG tablet FOR DIRECTIONS ON HOW TO   TAKE THIS MEDICINE, READ   THE ENCLOSED MEDICATION    INFORMATION FORM 540 tablet 0     topiramate (TOPAMAX) 50 MG tablet TAKE 1 AND 1/2 TABLETS BY MOUTH TWICE DAILY 180 tablet 1     fluticasone-salmeterol (ADVAIR DISKUS) 250-50 mcg/dose DISKUS Inhale 1 puff 2 (two) times a day. 1 each 0     No current facility-administered medications for this visit.       Objective:   Vital Signs:   Visit Vitals     /60 (Patient Site: Left Arm, Patient Position: Sitting, Cuff Size: Adult Small)     Pulse 81     Ht 5' 5.5\" (1.664 m)     Wt 108 lb 3.2 oz (49.1 kg)     SpO2 97%     Breastfeeding No     BMI 17.73 kg/m2        VisionScreening:  No exam data present     PHYSICAL EXAM  /60 (Patient Site: Left Arm, Patient Position: Sitting, Cuff Size: Adult Small)  Pulse 81  Ht 5' 5.5\" (1.664 m)  Wt 108 lb 3.2 oz (49.1 kg)  SpO2 97%  Breastfeeding? No  BMI 17.73 kg/m2    Vital signs noted above. AAO ×3.  HEENT negative except for impacted cerumen bilateral.  TM was clear once cerumen removed.  Neck: Supple neck, nonpalpable cervical lymph nodes. No thyromegaly. Lungs: Clear to auscultation bilateral.  Heart: S1-S2 regular rate and rhythm, systolic murmur noted.  Abdomen: Flat, soft with bowel sounds and nontender.  Extremities: No edema, pulses were full and equal. Breast exam: No nipple bleeding or discharge, no mass or tenderness, no axillary lymphadenopathy.     Assessment Results 5/10/2018   Activities of Daily Living No help needed   Instrumental Activities of Daily Living No help needed   Get Up and Go Score Less than 12 seconds   Mini Cog Total Score 5   Some recent data might be hidden     A Mini-Cog score of 0-2 suggests the possibility of dementia, score of 3-5 suggests no dementia    Identified Health Risks:     The patient was provided with suggestions to help her develop a healthy lifestyle.   She is " at risk for lack of exercise and discuss options to increase physical activity for the benefit of her well-being.  The patient was counseled and encouraged to consider modifying their diet and eating habits.  She has advanced directives.

## 2021-06-18 NOTE — LETTER
Letter by Deirdre Reyes CNP at      Author: Deirdre Reyes CNP Service: -- Author Type: --    Filed:  Encounter Date: 2/15/2019 Status: (Other)         Patient: Julisa Chaney   MR Number: [id number]   YOB: 1945   Date of Visit: 2/15/2019     Code Status:  FULL CODE  Visit Type: Problem Visit     Facility:  [facility] [[id number]]        Facility Type: SNF (Skilled Nursing Facility, TCU)    History of Present Illness: Julisa Chaney is a 73 y.o. female seen for problem visit per nursing request.  She had a recent hospitalization at [facility] 2/6 to 2/9/19.  She has a past medical history for trigeminal neuralgia and depression.  She had been having ongoing increased fatigue, low motivation, depression and low appetite for the past couple of months.  Prior to his hospitalization she was more sedated and encephalopathic and so was taken to the emergency room and found to be hypercalcemic.  PTH was normal, TSH was normal, vitamin D was low, B12 was normal, and a head CT showed no acute abnormalities.  It was presumed that her hypercalcemia was directly related to excessive intake of calcium Via Tums.  She did report that she does take a lot for indigestion.  She does have issues with chronic anemia and at discharge her hemoglobin was 9 and there was no evidence of bleeding.  During hospitalization she did present with bacterial conjunctivitis and was started on Polytrim drops to her left eye.    Nursing reports patient has not been sleeping well the last couple of days due to per patient ongoing pain.  Upon exam patient states that her pain is low upper back to epigastric region.  She states this is why I took Tums.  She is requesting Flexeril in order to have her relax at night.  She was taken off nortriptyline which was used for her trigeminal neuralgia, in the hospital due to encephalopathy.  She denies any other pain other than epigastric and trigeminal neuralgia.   Calcium drawn this week was 9.4 within normal limits, hemoglobin also drawn this week was 9.9 and stable.    Review of Systems   Patient denies fever, chills, headache, lightheadedness, dizziness, rhinorrhea, cough, congestion, shortness of breath, chest pain, palpitations, abdominal pain, n/v, diarrhea,  change in appetite, dysuria, frequency, burning or pain with urination.  Other than stated in HPI all other review of systems is negative.         Physical Exam   Vital signs: /67, heart rate 108, respiratory 18, temp 97.0.  GENERAL APPEARANCE: Thin, cachectic frail elderly female in no acute distress.  HEENT: normocephalic, atraumatic  PERRL, sclerae anicteric, conjunctivae clear, inner eyelids is pale EOM intact  Wears dentures.  NECK: Supple and symmetric. Trachea is midline, no thyromegaly, no adenopathy, and no tenderness  LUNGS: Lung sounds CTA, no adventitious sounds, respiratory effort normal.  CARD: RRR, S1, S2, without murmurs, gallops, rubs, no JVD,  ABD: Soft and nontender with normal bowel sounds.   MSK: Muscle strength and tone were normal.  EXTREMITIES: No cyanosis, clubbing or edema.  NEURO: Alert and oriented x 3. Normal affect.Face is symmetric.  SKIN: Inspection of the skin reveals no rashes, ulcerations or petechiae.  PSYCH: euthymic          Labs:    Recent Results (from the past 240 hour(s))   Basic Metabolic Panel   Result Value Ref Range    Sodium 144 136 - 145 mmol/L    Potassium 3.4 (L) 3.5 - 5.0 mmol/L    Chloride 99 98 - 107 mmol/L    CO2 32 (H) 22 - 31 mmol/L    Anion Gap, Calculation 13 5 - 18 mmol/L    Glucose 119 70 - 125 mg/dL    Calcium 14.1 (HH) 8.5 - 10.5 mg/dL    BUN 40 (H) 8 - 28 mg/dL    Creatinine 0.95 0.60 - 1.10 mg/dL    GFR MDRD Af Amer >60 >60 mL/min/1.73m2    GFR MDRD Non Af Amer 58 (L) >60 mL/min/1.73m2   Troponin I   Result Value Ref Range    Troponin I 0.05 0.00 - 0.29 ng/mL   HEM 2 Without Diff   Result Value Ref Range    WBC 11.6 (H) 4.0 - 11.0 thou/uL    RBC  5.41 (H) 3.80 - 5.40 mill/uL    Hemoglobin 11.7 (L) 12.0 - 16.0 g/dL    Hematocrit 41.8 35.0 - 47.0 %    MCV 77 (L) 80 - 100 fL    MCH 21.6 (L) 27.0 - 34.0 pg    MCHC 28.0 (L) 32.0 - 36.0 g/dL    RDW 17.8 (H) 11.0 - 14.5 %    Platelets 463 (H) 140 - 440 thou/uL    MPV 10.9 8.5 - 12.5 fL   Protime-INR   Result Value Ref Range    INR 0.97 0.90 - 1.10   APTT   Result Value Ref Range    PTT 24 24 - 37 seconds   Ammonia   Result Value Ref Range    Ammonia 22 11 - 35 umol/L   Hepatic Profile   Result Value Ref Range    Bilirubin, Total 0.2 0.0 - 1.0 mg/dL    Bilirubin, Direct 0.1 <=0.5 mg/dL    Protein, Total 7.3 6.0 - 8.0 g/dL    Albumin 4.0 3.5 - 5.0 g/dL    Alkaline Phosphatase 83 45 - 120 U/L    AST 16 0 - 40 U/L    ALT 11 0 - 45 U/L   Magnesium   Result Value Ref Range    Magnesium 2.0 1.8 - 2.6 mg/dL   Thyroid Stimulating Hormone (TSH)   Result Value Ref Range    TSH 0.80 0.30 - 5.00 uIU/mL   POCT Glucose   Result Value Ref Range    Glucose 112 70 - 139 mg/dL   ECG 12 lead   Result Value Ref Range    SYSTOLIC BLOOD PRESSURE  mmHg    DIASTOLIC BLOOD PRESSURE  mmHg    VENTRICULAR RATE 106 BPM    ATRIAL RATE 106 BPM    P-R INTERVAL 150 ms    QRS DURATION 84 ms    Q-T INTERVAL 316 ms    QTC CALCULATION (BEZET) 419 ms    P Axis 72 degrees    R AXIS 50 degrees    T AXIS -21 degrees    MUSE DIAGNOSIS       Sinus tachycardia  Minimal voltage criteria for LVH, may be normal variant  Septal infarct , age undetermined  Abnormal ECG  When compared with ECG of 03-OCT-2014 11:51,  Nonspecific T wave abnormality now evident in Inferior leads    Confirmed by CHAIM SCOTT MD LOC: ([id number]) on 2/7/2019 3:56:45 PM     Urinalysis-UC if Indicated   Result Value Ref Range    Color, UA Yellow Colorless, Yellow, Straw, Light Yellow    Clarity, UA Clear Clear    Glucose, UA Negative Negative    Bilirubin, UA Negative Negative    Ketones, UA Trace (!) Negative, 60 mg/dL    Specific Gravity, UA 1.014 1.001 - 1.030    Blood, UA Negative  Negative    pH, UA 5.5 4.5 - 8.0    Protein, UA Negative Negative mg/dL    Urobilinogen, UA <2.0 E.U./dL <2.0 E.U./dL, 2.0 E.U./dL    Nitrite, UA Negative Negative    Leukocytes, UA Large (!) Negative    Bacteria, UA Few (!) None Seen hpf    RBC, UA 0-2 None Seen, 0-2 hpf    WBC, UA 10-25 (!) None Seen, 0-5 hpf    Squam Epithel, UA 10-25 (!) None Seen, 0-5 lpf    Mucus, UA Few (!) None Seen lpf    Hyaline Casts, UA 10-25 (!) 0-5, None Seen lpf   Culture, Urine   Result Value Ref Range    Culture Mixture of urogenital organisms    Calcium, Ionized, Measured   Result Value Ref Range    Calcium, Ionized Measured 1.54 (H) 1.11 - 1.30 mmol/L    Calcium, Ionized pH 7.4 1.55 (H) 1.11 - 1.30 mmol/L    pH 7.41 7.35 - 7.45   BMP   Result Value Ref Range    Sodium 146 (H) 136 - 145 mmol/L    Potassium 3.1 (L) 3.5 - 5.0 mmol/L    Chloride 110 (H) 98 - 107 mmol/L    CO2 27 22 - 31 mmol/L    Anion Gap, Calculation 9 5 - 18 mmol/L    Glucose 71 70 - 125 mg/dL    Calcium 10.7 (H) 8.5 - 10.5 mg/dL    BUN 29 (H) 8 - 28 mg/dL    Creatinine 0.72 0.60 - 1.10 mg/dL    GFR MDRD Af Amer >60 >60 mL/min/1.73m2    GFR MDRD Non Af Amer >60 >60 mL/min/1.73m2   PTH   Result Value Ref Range    PTH 18 10 - 86 pg/mL   Albumin - Moderate Hypercalcemia (Corrected Calcium 12.1-14 mg/dL)   Result Value Ref Range    Albumin 2.8 (L) 3.5 - 5.0 g/dL   Magnesium   Result Value Ref Range    Magnesium 1.6 (L) 1.8 - 2.6 mg/dL   Vitamin D, Total (25-Hydroxy)   Result Value Ref Range    Vitamin D, Total (25-Hydroxy) 14.1 (L) 30.0 - 80.0 ng/mL   Calcium - Moderate Hypercalcemia (Corrected Calcium 12.1-14 mg/dL)   Result Value Ref Range    Calcium 9.8 8.5 - 10.5 mg/dL   Albumin - Moderate Hypercalcemia (Corrected Calcium 12.1-14 mg/dL)   Result Value Ref Range    Albumin 2.7 (L) 3.5 - 5.0 g/dL   Potassium   Result Value Ref Range    Potassium 3.1 (L) 3.5 - 5.0 mmol/L   Calcium, Ionized, Measured   Result Value Ref Range    Calcium, Ionized Measured 1.28 1.11 -  1.30 mmol/L    Calcium, Ionized pH 7.4 1.26 1.11 - 1.30 mmol/L    pH 7.37 7.35 - 7.45   BMP   Result Value Ref Range    Sodium 144 136 - 145 mmol/L    Potassium 2.9 (L) 3.5 - 5.0 mmol/L    Chloride 110 (H) 98 - 107 mmol/L    CO2 28 22 - 31 mmol/L    Anion Gap, Calculation 6 5 - 18 mmol/L    Glucose 75 70 - 125 mg/dL    Calcium 9.1 8.5 - 10.5 mg/dL    BUN 11 8 - 28 mg/dL    Creatinine 0.65 0.60 - 1.10 mg/dL    GFR MDRD Af Amer >60 >60 mL/min/1.73m2    GFR MDRD Non Af Amer >60 >60 mL/min/1.73m2   Magnesium   Result Value Ref Range    Magnesium 1.7 (L) 1.8 - 2.6 mg/dL   Calcium - Moderate Hypercalcemia (Corrected Calcium 12.1-14 mg/dL)   Result Value Ref Range    Calcium 9.1 8.5 - 10.5 mg/dL   Albumin - Moderate Hypercalcemia (Corrected Calcium 12.1-14 mg/dL)   Result Value Ref Range    Albumin 2.7 (L) 3.5 - 5.0 g/dL   HM1 (CBC with Diff)   Result Value Ref Range    WBC 10.4 4.0 - 11.0 thou/uL    RBC 3.93 3.80 - 5.40 mill/uL    Hemoglobin 8.6 (L) 12.0 - 16.0 g/dL    Hematocrit 30.6 (L) 35.0 - 47.0 %    MCV 78 (L) 80 - 100 fL    MCH 21.9 (L) 27.0 - 34.0 pg    MCHC 28.1 (L) 32.0 - 36.0 g/dL    RDW 17.9 (H) 11.0 - 14.5 %    Platelets 334 140 - 440 thou/uL    MPV 11.0 8.5 - 12.5 fL    Neutrophils % 70 50 - 70 %    Lymphocytes % 23 20 - 40 %    Monocytes % 6 2 - 10 %    Eosinophils % 1 0 - 6 %    Basophils % 0 0 - 2 %    Neutrophils Absolute 7.2 2.0 - 7.7 thou/uL    Lymphocytes Absolute 2.4 0.8 - 4.4 thou/uL    Monocytes Absolute 0.6 0.0 - 0.9 thou/uL    Eosinophils Absolute 0.1 0.0 - 0.4 thou/uL    Basophils Absolute 0.0 0.0 - 0.2 thou/uL   Vitamin B12   Result Value Ref Range    Vitamin B-12 450 213 - 816 pg/mL   Calcium - Moderate Hypercalcemia (Corrected Calcium 12.1-14 mg/dL)   Result Value Ref Range    Calcium 9.4 8.5 - 10.5 mg/dL   Albumin - Moderate Hypercalcemia (Corrected Calcium 12.1-14 mg/dL)   Result Value Ref Range    Albumin 2.7 (L) 3.5 - 5.0 g/dL   Potassium   Result Value Ref Range    Potassium 5.2 (H) 3.5  - 5.0 mmol/L   Calcium, Ionized, Measured   Result Value Ref Range    Calcium, Ionized Measured 1.34 (H) 1.11 - 1.30 mmol/L    Calcium, Ionized pH 7.4 1.33 (H) 1.11 - 1.30 mmol/L    pH 7.39 7.35 - 7.45   BMP   Result Value Ref Range    Sodium 140 136 - 145 mmol/L    Potassium 4.6 3.5 - 5.0 mmol/L    Chloride 109 (H) 98 - 107 mmol/L    CO2 27 22 - 31 mmol/L    Anion Gap, Calculation 4 (L) 5 - 18 mmol/L    Glucose 86 70 - 125 mg/dL    Calcium 9.4 8.5 - 10.5 mg/dL    BUN 8 8 - 28 mg/dL    Creatinine 0.65 0.60 - 1.10 mg/dL    GFR MDRD Af Amer >60 >60 mL/min/1.73m2    GFR MDRD Non Af Amer >60 >60 mL/min/1.73m2   Albumin - Moderate Hypercalcemia (Corrected Calcium 12.1-14 mg/dL)   Result Value Ref Range    Albumin 2.7 (L) 3.5 - 5.0 g/dL   HM1 (CBC with Diff)   Result Value Ref Range    WBC 10.8 4.0 - 11.0 thou/uL    RBC 4.05 3.80 - 5.40 mill/uL    Hemoglobin 9.0 (L) 12.0 - 16.0 g/dL    Hematocrit 31.5 (L) 35.0 - 47.0 %    MCV 78 (L) 80 - 100 fL    MCH 22.2 (L) 27.0 - 34.0 pg    MCHC 28.6 (L) 32.0 - 36.0 g/dL    RDW 18.4 (H) 11.0 - 14.5 %    Platelets 360 140 - 440 thou/uL    MPV 10.5 8.5 - 12.5 fL    Neutrophils % 73 (H) 50 - 70 %    Lymphocytes % 19 (L) 20 - 40 %    Monocytes % 6 2 - 10 %    Eosinophils % 1 0 - 6 %    Basophils % 1 0 - 2 %    Neutrophils Absolute 7.9 (H) 2.0 - 7.7 thou/uL    Lymphocytes Absolute 2.1 0.8 - 4.4 thou/uL    Monocytes Absolute 0.6 0.0 - 0.9 thou/uL    Eosinophils Absolute 0.1 0.0 - 0.4 thou/uL    Basophils Absolute 0.1 0.0 - 0.2 thou/uL   Basic Metabolic Panel   Result Value Ref Range    Sodium 140 136 - 145 mmol/L    Potassium 3.8 3.5 - 5.0 mmol/L    Chloride 105 98 - 107 mmol/L    CO2 28 22 - 31 mmol/L    Anion Gap, Calculation 7 5 - 18 mmol/L    Glucose 98 70 - 125 mg/dL    Calcium 9.9 8.5 - 10.5 mg/dL    BUN 9 8 - 28 mg/dL    Creatinine 0.67 0.60 - 1.10 mg/dL    GFR MDRD Af Amer >60 >60 mL/min/1.73m2    GFR MDRD Non Af Amer >60 >60 mL/min/1.73m2   Magnesium   Result Value Ref Range     Magnesium 2.2 1.8 - 2.6 mg/dL   Calcium   Result Value Ref Range    Calcium 9.4 8.5 - 10.5 mg/dL   HM1 (CBC with Diff)   Result Value Ref Range    WBC 6.6 4.0 - 11.0 thou/uL    RBC 4.61 3.80 - 5.40 mill/uL    Hemoglobin 9.9 (L) 12.0 - 16.0 g/dL    Hematocrit 36.0 35.0 - 47.0 %    MCV 78 (L) 80 - 100 fL    MCH 21.5 (L) 27.0 - 34.0 pg    MCHC 27.5 (L) 32.0 - 36.0 g/dL    RDW 19.0 (H) 11.0 - 14.5 %    Platelets 595 (H) 140 - 440 thou/uL    MPV 11.0 8.5 - 12.5 fL    Neutrophils % 47 (L) 50 - 70 %    Lymphocytes % 42 (H) 20 - 40 %    Monocytes % 7 2 - 10 %    Eosinophils % 3 0 - 6 %    Basophils % 1 0 - 2 %    Neutrophils Absolute 3.1 2.0 - 7.7 thou/uL    Lymphocytes Absolute 2.8 0.8 - 4.4 thou/uL    Monocytes Absolute 0.5 0.0 - 0.9 thou/uL    Eosinophils Absolute 0.2 0.0 - 0.4 thou/uL    Basophils Absolute 0.1 0.0 - 0.2 thou/uL         Assessment:  1. Reflux gastritis     2. Epigastric pain     3. Hypercalcemia     4. Trigeminal neuralgia         Plan:   Reflux: Start on omeprazole 20 mg could increase to 20 twice daily if she does not have relief in the next few days.  Counseled on because of epigastric pain and that Flexeril is not an appropriate medication for this type of discomfort.  Hypercalcemia: Is resolved continue to monitor.  Trigeminal neuralgia: Restart nortriptyline 50 mg at at bedtime, will change her Tylenol 3 to as needed every 6 hours, will also add hydroxyzine in order to help restlessness and itching at night.  We will need to monitor for somnolence.  Discussion in regards to home medications and what has worked for her in the past.      35 minutes was spent face-to-face with patient discussing current clinical status, medication review with side effects and response, lab review and discussion of plan of care.    Electronically signed by: Deirdre Reyes, SADE

## 2021-06-18 NOTE — LETTER
Letter by Ed Jones MD at      Author: Ed Jones MD Service: -- Author Type: --    Filed:  Encounter Date: 2/17/2019 Status: (Other)         Patient: Julisa Chaney   MR Number: [id number]   YOB: 1945   Date of Visit: 2/17/2019     Code Status:  FULL CODE  Visit Type: H & P     Facility:  [facility] [[id number]]      Facility Type: SNF (Skilled Nursing Facility, TCU)    History of Present Illness:   Hospital Admission Date: February 6, 2019 [facility] Discharge Date: February 9, 2019  Facility Admission Date: February 9, 2019 [facility] transitional care unit    Julisa Chaney is a 73 y.o. female with chronic left-sided facial pain secondary to trigeminal neuralgia who is brought in by her daughter for worsening encephalopathy.  Her mental status had been declining for about a week.  She does take multiple over-the-counter Tums.    Hospital course the patient was found to have a severe hyperkalemia initially to 14.1 and workup revealed a normal PTH TSH normal and a vitamin D level low.  The Tums were discontinued and vitamin D was begun.  Her sedation improved every day but she was still weak.  Her nortriptyline was held.  The CT of the head showed no acute abnormalities.  Her vitamin B12 was normal.  They did replace the low K and the low magnesium.  They continued her on Zoloft but at a reduced dose.  She did have a moderate protein calorie malnutrition and anemia of chronic disease at the level of 9.    Our facility course to date dietary has added supplements ice cream and med Pass.  Deirdre started the nortriptyline back and she is felt much better with her trigeminal pain.  She is taking Tylenol 3 now every 6 hours as needed.  This is been very helpful.  In addition she was started on omeprazole and her gastric pain has significantly improved    Past Medical History:   Diagnosis Date   ? High cholesterol    ? Migraines    ? Trigeminal  neuralgia      Past Surgical History:   Procedure Laterality Date   ? NC TOTAL ABDOM HYSTERECTOMY      Description: Total Abdominal Hysterectomy;  Recorded: 2010;     Family History   Problem Relation Age of Onset   ? Cancer Father    ? Testicular cancer Grandchild 17   ? Breast cancer Mother    ? Breast cancer Sister    ? Breast cancer Maternal Aunt    ? Breast cancer Sister      Social History     Socioeconomic History   ? Marital status:      Spouse name: Not on file   ? Number of children: Not on file   ? Years of education: Not on file   ? Highest education level: Not on file   Social Needs   ? Financial resource strain: Not on file   ? Food insecurity - worry: Not on file   ? Food insecurity - inability: Not on file   ? Transportation needs - medical: Not on file   ? Transportation needs - non-medical: Not on file   Occupational History   ? Not on file   Tobacco Use   ? Smoking status: Former Smoker     Packs/day: 1.00     Years: 50.00     Pack years: 50.00     Last attempt to quit: 2014     Years since quittin.2   ? Smokeless tobacco: Never Used   ? Tobacco comment: stop smoking packet given   Substance and Sexual Activity   ? Alcohol use: No   ? Drug use: No   ? Sexual activity: Not on file   Other Topics Concern   ? Not on file   Social History Narrative    Lives alone in the house, relay in [place]    [employer] help with house maintenance    Daughter lives close by.    Mara Murillo MD 2018 1:49 PM       Additional Geriatric Review patient lives alone has a daughter that lives nearby her  passed away in .  Current Outpatient Medications   Medication Sig Dispense Refill   ? acetaminophen-codeine (TYLENOL #3) 300-30 mg per tablet TAKE 1 TABLET BY MOUTH THREE TIMES DAILY AS NEEDED FOR PAIN (Patient taking differently: TAKE 1 TABLET BY MOUTH EVERY 6 HOURS AS NEEDED FOR PAIN) 120 tablet 0   ? aspirin-acetaminophen-caffeine (EXCEDRIN MIGRAINE) 250-250-65 mg per tablet Take  "1 tablet by mouth every 6 (six) hours as needed for pain.     ? cholecalciferol, vitamin D3, (VITAMIN D3) 2,000 unit Tab One tab daily (Patient taking differently: Take 2,000 Units by mouth daily. One tab daily      ) 90 tablet 3   ? hydrOXYzine HCl (ATARAX) 25 MG tablet Take 25 mg by mouth at bedtime.     ? multivitamin (ONE A DAY) per tablet Take 1 tablet by mouth daily. 120 tablet 2   ? nortriptyline (PAMELOR) 10 MG capsule Take 50 mg by mouth at bedtime.     ? omeprazole (PRILOSEC) 20 MG capsule Take 20 mg by mouth daily before breakfast.     ? OXcarbazepine (TRILEPTAL) 150 MG tablet Take 3 tablets (450 mg total) by mouth at bedtime. 270 tablet 3   ? polymyxin B-trimethoprim (FOR POLYTRIM) 10,000 unit- 1 mg/mL Drop ophthalmic drops 1 drop to left eye 3 times daily for 5 days (Patient taking differently: 1 drop. 1 drop to both 3 times daily for 5 days      ) 1 Bottle 0   ? sertraline (ZOLOFT) 25 MG tablet Take 1 tablet (25 mg total) by mouth daily. 90 tablet 0   ? topiramate (TOPAMAX) 50 MG tablet Take 1 tablet (50 mg total) by mouth daily. 90 tablet 3     No current facility-administered medications for this visit.      Allergies   Allergen Reactions   ? Mirtazapine Other (See Comments)     \"Needles to the face\"   ? Penicillins Rash     Immunization History   Administered Date(s) Administered   ? DT (pediatric) 08/30/2000   ? Hep A, historic 04/07/2010   ? Influenza B3m7-72, 01/15/2010   ? Influenza high dose, seasonal 10/29/2015, 09/08/2016, 10/15/2018   ? Influenza, Seasonal, Inj PF IIV3 10/09/2010, 09/30/2011, 11/13/2012, 11/21/2013   ? Influenza, inj, historic,unspecified 11/07/2007, 10/22/2008, 10/02/2009   ? Influenza,seasonal quad, PF, 36+MOS 10/10/2014   ? Pneumo Conj 13-V (2010&after) 10/29/2015   ? Pneumo Polysac 23-V 10/10/2014   ? Td, adult adsorbed, PF 05/10/2018   ? Td,adult,historic,unspecified 11/07/2007   ? Tdap 11/07/2007   ? ZOSTER, LIVE 11/07/2007         Review of Systems   Constitutional: " Negative.  Negative for appetite change, chills, diaphoresis, fatigue, fever and unexpected weight change.   HENT: Negative for congestion, dental problem, ear pain, hearing loss, nosebleeds, sinus pressure, sore throat, tinnitus and trouble swallowing.    Eyes: Negative for photophobia, pain, discharge, itching and visual disturbance.   Respiratory: Negative for apnea, cough, chest tightness, shortness of breath and wheezing.    Cardiovascular: Negative for chest pain and palpitations.   Gastrointestinal: Negative for abdominal distention, abdominal pain, constipation and diarrhea.   Endocrine: Negative for cold intolerance, heat intolerance and polydipsia.   Genitourinary: Negative.  Negative for decreased urine volume, difficulty urinating, dysuria, enuresis, frequency, hematuria, menstrual problem, urgency and vaginal discharge.   Musculoskeletal: Negative for arthralgias, back pain and gait problem.   Allergic/Immunologic: Negative.    Neurological: Negative for dizziness, tremors, syncope, facial asymmetry, speech difficulty, weakness, light-headedness, numbness and headaches.   Hematological: Negative.    Psychiatric/Behavioral: Negative for agitation, behavioral problems, confusion, decreased concentration, dysphoric mood and hallucinations. The patient is not hyperactive.         Physical Exam   Constitutional: She is oriented to person, place, and time. She appears well-developed and well-nourished.   Cachectic in appearance with a BMI of only 14.85   HENT:   Head: Normocephalic and atraumatic.   Right Ear: External ear normal.   Left Ear: External ear normal.   Nose: Nose normal.   Mouth/Throat: Oropharynx is clear and moist.   Eyes: Conjunctivae and EOM are normal. Pupils are equal, round, and reactive to light. Left eye exhibits no discharge. No scleral icterus.   Bilateral yellowish discharge on the lids.  More prominent on the left than on the right.   Neck: Neck supple. No JVD present. No tracheal  deviation present. No thyromegaly present.   Cardiovascular: Normal rate, regular rhythm and normal heart sounds. Exam reveals no friction rub.   No murmur heard.  Pulmonary/Chest: Effort normal and breath sounds normal. No respiratory distress. She has no wheezes. She has no rales. She exhibits no tenderness.   Abdominal: She exhibits no distension and no mass. There is no tenderness. There is no guarding.   Musculoskeletal: Normal range of motion. She exhibits no edema or tenderness.   Lymphadenopathy:     She has no cervical adenopathy.   Neurological: She is alert and oriented to person, place, and time. She has normal reflexes. No cranial nerve deficit. She exhibits normal muscle tone. Coordination normal.   Skin: Skin is warm and dry. No rash noted. No erythema.   Psychiatric: She has a normal mood and affect. Her behavior is normal. Thought content normal.   Nursing note and vitals reviewed.        Labs:  All labs reviewed in the nursing home record.  Recent Results (from the past 168 hour(s))   Basic Metabolic Panel   Result Value Ref Range    Sodium 140 136 - 145 mmol/L    Potassium 3.8 3.5 - 5.0 mmol/L    Chloride 105 98 - 107 mmol/L    CO2 28 22 - 31 mmol/L    Anion Gap, Calculation 7 5 - 18 mmol/L    Glucose 98 70 - 125 mg/dL    Calcium 9.9 8.5 - 10.5 mg/dL    BUN 9 8 - 28 mg/dL    Creatinine 0.67 0.60 - 1.10 mg/dL    GFR MDRD Af Amer >60 >60 mL/min/1.73m2    GFR MDRD Non Af Amer >60 >60 mL/min/1.73m2   Magnesium   Result Value Ref Range    Magnesium 2.2 1.8 - 2.6 mg/dL   Calcium   Result Value Ref Range    Calcium 9.4 8.5 - 10.5 mg/dL   HM1 (CBC with Diff)   Result Value Ref Range    WBC 6.6 4.0 - 11.0 thou/uL    RBC 4.61 3.80 - 5.40 mill/uL    Hemoglobin 9.9 (L) 12.0 - 16.0 g/dL    Hematocrit 36.0 35.0 - 47.0 %    MCV 78 (L) 80 - 100 fL    MCH 21.5 (L) 27.0 - 34.0 pg    MCHC 27.5 (L) 32.0 - 36.0 g/dL    RDW 19.0 (H) 11.0 - 14.5 %    Platelets 595 (H) 140 - 440 thou/uL    MPV 11.0 8.5 - 12.5 fL     Neutrophils % 47 (L) 50 - 70 %    Lymphocytes % 42 (H) 20 - 40 %    Monocytes % 7 2 - 10 %    Eosinophils % 3 0 - 6 %    Basophils % 1 0 - 2 %    Neutrophils Absolute 3.1 2.0 - 7.7 thou/uL    Lymphocytes Absolute 2.8 0.8 - 4.4 thou/uL    Monocytes Absolute 0.5 0.0 - 0.9 thou/uL    Eosinophils Absolute 0.2 0.0 - 0.4 thou/uL    Basophils Absolute 0.1 0.0 - 0.2 thou/uL     Assessment:  1. Hypercalcemia     2. Trigeminal neuralgia     3. Encephalopathy     4. Weight loss     5. Reflux gastritis     6. Epigastric pain     7. Other constipation     8. Anemia, unspecified type     9. Moderate protein-calorie malnutrition (H)         Plan: At this juncture she is begun now to have some crusty discharge in her eyes after completion of her antibiotic therapy for her eyes.  She apparently has a chronic problems with tear ducts.  Because of this we will renew the polymyxin B trimethoprim.  1 drop OU 3 times daily till 1 Day Pass clear.  After that would give consideration of placing just a refresher LiquiTears to keep those ducts clean.  In regards to her low weight we will do 2 times a week weights.  On Monday we will get a serum iron ferritin pre-albumin and albumin for the anemia which is microcytic at this juncture.    Total 45 minutes of which 65% was spent in counseling and coordination of care of the above plan.    Electronically signed by: Ed Jones MD

## 2021-06-18 NOTE — LETTER
Letter by Deirdre Reyes CNP at      Author: Deirdre Reyes CNP Service: -- Author Type: --    Filed:  Encounter Date: 2/25/2019 Status: (Other)         Patient: Julisa Chaney   MR Number: [id number]   YOB: 1945   Date of Visit: 2/25/2019     Code Status:  FULL CODE  Visit Type: Follow Up     Facility:  [facility] [[id number]]        Facility Type: SNF (Skilled Nursing Facility, TCU)    History of Present Illness: Julisa Chaney is a 73 y.o. female seen for problem visit per nursing request.  She had a recent hospitalization at [facility] 2/6 to 2/9/19.  She has a past medical history for trigeminal neuralgia and depression.  She had been having ongoing increased fatigue, low motivation, depression and low appetite for the past couple of months.  Prior to his hospitalization she was more sedated and encephalopathic and so was taken to the emergency room and found to be hypercalcemic.  PTH was normal, TSH was normal, vitamin D was low, B12 was normal, and a head CT showed no acute abnormalities.  It was presumed that her hypercalcemia was directly related to excessive intake of calcium Via Tums.  She did report that she does take a lot for indigestion.  She does have issues with chronic anemia and at discharge her hemoglobin was 9 and there was no evidence of bleeding.  During hospitalization she did present with bacterial conjunctivitis and was started on Polytrim drops to her left eye.    Today, she reports improvement in her gastric discomfort omeprazole.  Guaiacs of stools were negative and H. pylori was negative.  Her conjunctivitis has also resolved and she has stopped the Polytrim drops.  Her appetite is improving and she is drinking all the supplement drinks.  She has gained 5 pounds since her admission.  And is advancing well in therapy.     Review of Systems   Patient denies fever, chills, headache, lightheadedness, dizziness, rhinorrhea, cough, congestion,  shortness of breath, chest pain, palpitations, abdominal pain, n/v, diarrhea,  change in appetite, dysuria, frequency, burning or pain with urination.  Other than stated in HPI all other review of systems is negative.         Physical Exam    Vital signs: 117/51, heart rate 71, respiratory 18, temp 96.8, weight 95 pounds.  GENERAL APPEARANCE: Thin, cachectic frail elderly female in no acute distress.  HEENT: normocephalic, atraumatic  PERRL, sclerae anicteric, conjunctivae clear, EOM intact  Wears dentures.  LUNGS: Lung sounds CTA, no adventitious sounds, respiratory effort normal.  CARD: RRR, S1, S2, without murmurs, gallops, rubs, no JVD,  ABD: Soft and nontender with normal bowel sounds.  She does have audible heart sounds and her abdomen however she does not have aortic pulsation noted.  MSK: Muscle strength and tone were normal.  EXTREMITIES: No cyanosis, clubbing or edema.  NEURO: Alert and oriented x 3. Normal affect.Face is symmetric.  SKIN: Inspection of the skin reveals no rashes, ulcerations or petechiae.  PSYCH: euthymic          Labs:    Recent Results (from the past 240 hour(s))   Albumin   Result Value Ref Range    Albumin 2.7 (L) 3.5 - 5.0 g/dL   Ferritin   Result Value Ref Range    Ferritin 6 (L) 10 - 130 ng/mL   Prealbumin   Result Value Ref Range    Prealbumin 17.7 (L) 19.0 - 38.0 mg/dL   HM1 (CBC with Diff)   Result Value Ref Range    WBC 6.4 4.0 - 11.0 thou/uL    RBC 3.67 (L) 3.80 - 5.40 mill/uL    Hemoglobin 7.9 (L) 12.0 - 16.0 g/dL    Hematocrit 28.3 (L) 35.0 - 47.0 %    MCV 77 (L) 80 - 100 fL    MCH 21.5 (L) 27.0 - 34.0 pg    MCHC 27.9 (L) 32.0 - 36.0 g/dL    RDW 19.2 (H) 11.0 - 14.5 %    Platelets 585 (H) 140 - 440 thou/uL    MPV 9.4 8.5 - 12.5 fL    Neutrophils % 39 (L) 50 - 70 %    Lymphocytes % 50 (H) 20 - 40 %    Monocytes % 8 2 - 10 %    Eosinophils % 3 0 - 6 %    Basophils % 1 0 - 2 %    Neutrophils Absolute 2.5 2.0 - 7.7 thou/uL    Lymphocytes Absolute 3.2 0.8 - 4.4 thou/uL     Monocytes Absolute 0.5 0.0 - 0.9 thou/uL    Eosinophils Absolute 0.2 0.0 - 0.4 thou/uL    Basophils Absolute 0.1 0.0 - 0.2 thou/uL   Hemoglobin   Result Value Ref Range    Hemoglobin 7.9 (L) 12.0 - 16.0 g/dL   H. pylori Antigen, Stool   Result Value Ref Range    Specimen Description Feces     H pylori Antigen SEE NOTES     Report Status FINAL 02/22/2019          Assessment:  1. Reflux gastritis     2. Epigastric pain     3. Weight loss     4. Moderate protein-calorie malnutrition (H)     5. Trigeminal neuralgia     6. Acute conjunctivitis of both eyes, unspecified acute conjunctivitis type     7. Anemia, unspecified type         Plan:   Reflux gastritis and epigastric pain: Continue omeprazole.  Weight loss: Improving continue with supplements and encouraging oral intake.  Trigeminal neuralgia: Continue nortriptyline, Trileptal, Topamax, and Tylenol 3.  Conjunctivitis: Resolved will start on liquid tears for dry eyes.  Anemia: Will draw CBC this week.      Electronically signed by: Deirdre Reyes CNP

## 2021-06-18 NOTE — PROGRESS NOTES
Care Guide met with the patient in clinic for Clinic Care Coordination outreach follow up on goals and action steps.    Dentist:   Saw the dentist in May  Was able to get her questions answered  Was worried about the teeth that have broken off and have roots left  Patient stated her dentist informed her the surgeon will know exactly how to handle them  Patient feels much better since the appointment (less worried)  Will need molds completed prior to being able to have surgery    Follow Up with Dr. Hansen, Pulmonologist:  Has not scheduled yet  Questioned what her plans are  Patient stated she has the number and will call to schedule    Establishing Care with New PCP:  Questioned what her plans are now that Dr. Roth is gone  Patient stated she was planning on calling today to schedule with one of the other providers  Patient stated she didn't have a specific provider in mind  Reviewed her options: Dr. Murillo, Dr. Ahn, Lori Strauss, CNP  Patient chose Dr. Murillo    I assisted in coordinating an appointment to establish care on 6/21/18 at 1:30  Patient stated she may need refills prior to the appointment  I informed her to contact her pharmacy  The pharmacy will contact the clinic  There is a doctor here that is scheduled to cover Dr. Roth's patients each day so that things are followed through on  Patient acknowledged understanding    Mammogram:  Reviewed pending Mammogram for today  Arrival time is 1:30    RN Assessment History:  3/2/18--cancelled by patient    Plan for Next Outreach:  1.  Discuss rescheduling the RN Assessment    Pending Appointments:  6/12/18 at 1:45 for Mammogram  6/21/18 at 1:30 with Dr. Murillo (Establish Care, transfer from Dr. Roth)  ___________________  Next Outreach Date: 7/12/18  Planned Outreach Frequency: monthly  Preferred Phone Number: [phone]    RN Assessment Date: DUE   Goal Setting Date: 1/23/15    Chronic Medical Diagnosis/Physical Health Needs:  Osteopenia  Hyperlipidemia  Migraine  Headache  Trigeminal Neuralgia  Acute Respiratory Failure  Underweight  Protein-Calorie Malnutrition, Severe  Shortness of Breath  COPD    Medications:  Medication Management: Independent  Pharmacy: [facility]    Preventative Measures:  Medical Insurance: Blue Cross Blue Shield Platinum Blue  ID: [id number]    Medicare Part A and B  ID: [id number]    Annual Physical Exam: 5/10/18  Mammogram: 1/18/06 (Scheduled for 6/12/18)  Dexa Scan: DUE  Colonoscopy: 5/3/06 DUE  Fall Risk Assessment: 5/10/18  Advance Directives: On File  Flu Shot: 9/8/16  Td Shot: 5/10/18  Zostavax Shot: 11/7/07  PCV-13 Shot: 10/29/15  Pneumovax Shot: 10/10/14    Mental Health Diagnosis/Needs:   Anxiety    Mental Health Provider:   Unknown    Hygiene:  Independent    Rest/Sleep:  Unknown    Nutrition:  Meals on Wheels  Boost once a day  Has had at least 6 teeth extracted, may have had more    Substance/CD:  Use of Cigarettes: Quit in 2014  Second Hand Smoke Exposure: None  Alcohol Use: None  Street Drugs: None    Family Planning:  NA    Employment/Education:  Unknown    Housing:  House    Interpersonal:      Household Members:  Self    Safety:  Hoarding tendencies    Daily Activities/Exercise:  Scrapbooking    Social Network:  Daughter lives close by  2 sisters live in the area    Latter day/Spiritual:  Unknown    Transportation:  Does not drive  Daughter drives her to appointments     Financial/Expenses:  Has a 401K    Understanding of Health and Wellbeing/Barriers:  Unknown    Current Services/Support:  Meals on Wheels

## 2021-06-18 NOTE — LETTER
Letter by Deirdre Reyes CNP at      Author: Deirdre Reyes CNP Service: -- Author Type: --    Filed:  Encounter Date: 2/12/2019 Status: (Other)         Patient: Julisa Chaney   MR Number: [id number]   YOB: 1945   Date of Visit: 2/12/2019     Code Status:  FULL CODE  Visit Type: Follow Up     Facility:  [facility] [[id number]]        Facility Type: SNF (Skilled Nursing Facility, TCU)    History of Present Illness: Julisa Chaney is a 73 y.o. female seen today for initial follow-up visit from hospitalization at [facility] 2/6 to 2/9/19.  She has a past medical history for trigeminal neuralgia and depression.  She had been having ongoing increased fatigue, low motivation, depression and low appetite for the past couple of months.  Prior to his hospitalization she was more sedated and encephalopathic and so was taken to the emergency room and found to be hypercalcemic.  PTH was normal, TSH was normal, vitamin D was low, B12 was normal, and a head CT showed no acute abnormalities.  It was presumed that her hypercalcemia was directly related to excessive intake of calcium Via Tums.  She did report that she does take a lot for indigestion.  She does have issues with chronic anemia and at discharge her hemoglobin was 9 and there was no evidence of bleeding.  During hospitalization she did present with bacterial conjunctivitis and was started on Polytrim drops to her left eye.    Today, she reports ongoing trigeminal neuralgia pain in which she typically takes Tylenol 3-4 times a day.  She also did not, with an order for her migraines and she typically takes Excedrin migraine.  She reports that she has lost a little over 10 pounds in the past couple of months related to her clinical status.  Her most recent labs drawn on 2/11 showed a sodium of 140, potassium 3.8, chloride of 105.  However the calcium was not checked.  She also started to have itching, thick matting and redness  to her right eye and so yesterday she started on Polytrim drops in both eyes.  Her eyes look as if they are improving as the sclera is white with clear drainage.  She does complain of constipation and states prunes do work at home.    Review of Systems   Patient denies fever, chills, headache, lightheadedness, dizziness, rhinorrhea, cough, congestion, shortness of breath, chest pain, palpitations, abdominal pain, n/v, diarrhea,  change in appetite, dysuria, frequency, burning or pain with urination.  Other than stated in HPI all other review of systems is negative.         Physical Exam   Vital signs: /60, heart rate 106, respiratory 18, temp 97.0, 94% on room air, weight at 92 pounds.  GENERAL APPEARANCE: Thin, cachectic frail elderly female in no acute distress.  HEENT: normocephalic, atraumatic  PERRL, sclerae anicteric, conjunctivae clear drainage with matting at the inner canthal folds, inner eyelids is pale EOM intact  Wears dentures.  NECK: Supple and symmetric. Trachea is midline, no thyromegaly, no adenopathy, and no tenderness  LUNGS: Lung sounds CTA, no adventitious sounds, respiratory effort normal.  CARD: RRR, S1, S2, without murmurs, gallops, rubs, no JVD,  ABD: Soft and nontender with normal bowel sounds.   MSK: Muscle strength and tone were normal.  EXTREMITIES: No cyanosis, clubbing or edema.  NEURO: Alert and oriented x 3. Normal affect.Face is symmetric.  SKIN: Inspection of the skin reveals no rashes, ulcerations or petechiae.  PSYCH: euthymic          Labs:    Recent Results (from the past 240 hour(s))   Basic Metabolic Panel   Result Value Ref Range    Sodium 144 136 - 145 mmol/L    Potassium 3.4 (L) 3.5 - 5.0 mmol/L    Chloride 99 98 - 107 mmol/L    CO2 32 (H) 22 - 31 mmol/L    Anion Gap, Calculation 13 5 - 18 mmol/L    Glucose 119 70 - 125 mg/dL    Calcium 14.1 (HH) 8.5 - 10.5 mg/dL    BUN 40 (H) 8 - 28 mg/dL    Creatinine 0.95 0.60 - 1.10 mg/dL    GFR MDRD Af Amer >60 >60 mL/min/1.73m2     GFR MDRD Non Af Amer 58 (L) >60 mL/min/1.73m2   Troponin I   Result Value Ref Range    Troponin I 0.05 0.00 - 0.29 ng/mL   HEM 2 Without Diff   Result Value Ref Range    WBC 11.6 (H) 4.0 - 11.0 thou/uL    RBC 5.41 (H) 3.80 - 5.40 mill/uL    Hemoglobin 11.7 (L) 12.0 - 16.0 g/dL    Hematocrit 41.8 35.0 - 47.0 %    MCV 77 (L) 80 - 100 fL    MCH 21.6 (L) 27.0 - 34.0 pg    MCHC 28.0 (L) 32.0 - 36.0 g/dL    RDW 17.8 (H) 11.0 - 14.5 %    Platelets 463 (H) 140 - 440 thou/uL    MPV 10.9 8.5 - 12.5 fL   Protime-INR   Result Value Ref Range    INR 0.97 0.90 - 1.10   APTT   Result Value Ref Range    PTT 24 24 - 37 seconds   Ammonia   Result Value Ref Range    Ammonia 22 11 - 35 umol/L   Hepatic Profile   Result Value Ref Range    Bilirubin, Total 0.2 0.0 - 1.0 mg/dL    Bilirubin, Direct 0.1 <=0.5 mg/dL    Protein, Total 7.3 6.0 - 8.0 g/dL    Albumin 4.0 3.5 - 5.0 g/dL    Alkaline Phosphatase 83 45 - 120 U/L    AST 16 0 - 40 U/L    ALT 11 0 - 45 U/L   Magnesium   Result Value Ref Range    Magnesium 2.0 1.8 - 2.6 mg/dL   Thyroid Stimulating Hormone (TSH)   Result Value Ref Range    TSH 0.80 0.30 - 5.00 uIU/mL   POCT Glucose   Result Value Ref Range    Glucose 112 70 - 139 mg/dL   ECG 12 lead   Result Value Ref Range    SYSTOLIC BLOOD PRESSURE  mmHg    DIASTOLIC BLOOD PRESSURE  mmHg    VENTRICULAR RATE 106 BPM    ATRIAL RATE 106 BPM    P-R INTERVAL 150 ms    QRS DURATION 84 ms    Q-T INTERVAL 316 ms    QTC CALCULATION (BEZET) 419 ms    P Axis 72 degrees    R AXIS 50 degrees    T AXIS -21 degrees    MUSE DIAGNOSIS       Sinus tachycardia  Minimal voltage criteria for LVH, may be normal variant  Septal infarct , age undetermined  Abnormal ECG  When compared with ECG of 03-OCT-2014 11:51,  Nonspecific T wave abnormality now evident in Inferior leads    Confirmed by CHAIM SCOTT MD LOC:[facility] ([id number]) on 2/7/2019 3:56:45 PM     Urinalysis-UC if Indicated   Result Value Ref Range    Color, UA Yellow Colorless, Yellow, Straw, Light  Yellow    Clarity, UA Clear Clear    Glucose, UA Negative Negative    Bilirubin, UA Negative Negative    Ketones, UA Trace (!) Negative, 60 mg/dL    Specific Gravity, UA 1.014 1.001 - 1.030    Blood, UA Negative Negative    pH, UA 5.5 4.5 - 8.0    Protein, UA Negative Negative mg/dL    Urobilinogen, UA <2.0 E.U./dL <2.0 E.U./dL, 2.0 E.U./dL    Nitrite, UA Negative Negative    Leukocytes, UA Large (!) Negative    Bacteria, UA Few (!) None Seen hpf    RBC, UA 0-2 None Seen, 0-2 hpf    WBC, UA 10-25 (!) None Seen, 0-5 hpf    Squam Epithel, UA 10-25 (!) None Seen, 0-5 lpf    Mucus, UA Few (!) None Seen lpf    Hyaline Casts, UA 10-25 (!) 0-5, None Seen lpf   Culture, Urine   Result Value Ref Range    Culture Mixture of urogenital organisms    Calcium, Ionized, Measured   Result Value Ref Range    Calcium, Ionized Measured 1.54 (H) 1.11 - 1.30 mmol/L    Calcium, Ionized pH 7.4 1.55 (H) 1.11 - 1.30 mmol/L    pH 7.41 7.35 - 7.45   BMP   Result Value Ref Range    Sodium 146 (H) 136 - 145 mmol/L    Potassium 3.1 (L) 3.5 - 5.0 mmol/L    Chloride 110 (H) 98 - 107 mmol/L    CO2 27 22 - 31 mmol/L    Anion Gap, Calculation 9 5 - 18 mmol/L    Glucose 71 70 - 125 mg/dL    Calcium 10.7 (H) 8.5 - 10.5 mg/dL    BUN 29 (H) 8 - 28 mg/dL    Creatinine 0.72 0.60 - 1.10 mg/dL    GFR MDRD Af Amer >60 >60 mL/min/1.73m2    GFR MDRD Non Af Amer >60 >60 mL/min/1.73m2   PTH   Result Value Ref Range    PTH 18 10 - 86 pg/mL   Albumin - Moderate Hypercalcemia (Corrected Calcium 12.1-14 mg/dL)   Result Value Ref Range    Albumin 2.8 (L) 3.5 - 5.0 g/dL   Magnesium   Result Value Ref Range    Magnesium 1.6 (L) 1.8 - 2.6 mg/dL   Vitamin D, Total (25-Hydroxy)   Result Value Ref Range    Vitamin D, Total (25-Hydroxy) 14.1 (L) 30.0 - 80.0 ng/mL   Calcium - Moderate Hypercalcemia (Corrected Calcium 12.1-14 mg/dL)   Result Value Ref Range    Calcium 9.8 8.5 - 10.5 mg/dL   Albumin - Moderate Hypercalcemia (Corrected Calcium 12.1-14 mg/dL)   Result Value Ref  Range    Albumin 2.7 (L) 3.5 - 5.0 g/dL   Potassium   Result Value Ref Range    Potassium 3.1 (L) 3.5 - 5.0 mmol/L   Calcium, Ionized, Measured   Result Value Ref Range    Calcium, Ionized Measured 1.28 1.11 - 1.30 mmol/L    Calcium, Ionized pH 7.4 1.26 1.11 - 1.30 mmol/L    pH 7.37 7.35 - 7.45   BMP   Result Value Ref Range    Sodium 144 136 - 145 mmol/L    Potassium 2.9 (L) 3.5 - 5.0 mmol/L    Chloride 110 (H) 98 - 107 mmol/L    CO2 28 22 - 31 mmol/L    Anion Gap, Calculation 6 5 - 18 mmol/L    Glucose 75 70 - 125 mg/dL    Calcium 9.1 8.5 - 10.5 mg/dL    BUN 11 8 - 28 mg/dL    Creatinine 0.65 0.60 - 1.10 mg/dL    GFR MDRD Af Amer >60 >60 mL/min/1.73m2    GFR MDRD Non Af Amer >60 >60 mL/min/1.73m2   Magnesium   Result Value Ref Range    Magnesium 1.7 (L) 1.8 - 2.6 mg/dL   Calcium - Moderate Hypercalcemia (Corrected Calcium 12.1-14 mg/dL)   Result Value Ref Range    Calcium 9.1 8.5 - 10.5 mg/dL   Albumin - Moderate Hypercalcemia (Corrected Calcium 12.1-14 mg/dL)   Result Value Ref Range    Albumin 2.7 (L) 3.5 - 5.0 g/dL   HM1 (CBC with Diff)   Result Value Ref Range    WBC 10.4 4.0 - 11.0 thou/uL    RBC 3.93 3.80 - 5.40 mill/uL    Hemoglobin 8.6 (L) 12.0 - 16.0 g/dL    Hematocrit 30.6 (L) 35.0 - 47.0 %    MCV 78 (L) 80 - 100 fL    MCH 21.9 (L) 27.0 - 34.0 pg    MCHC 28.1 (L) 32.0 - 36.0 g/dL    RDW 17.9 (H) 11.0 - 14.5 %    Platelets 334 140 - 440 thou/uL    MPV 11.0 8.5 - 12.5 fL    Neutrophils % 70 50 - 70 %    Lymphocytes % 23 20 - 40 %    Monocytes % 6 2 - 10 %    Eosinophils % 1 0 - 6 %    Basophils % 0 0 - 2 %    Neutrophils Absolute 7.2 2.0 - 7.7 thou/uL    Lymphocytes Absolute 2.4 0.8 - 4.4 thou/uL    Monocytes Absolute 0.6 0.0 - 0.9 thou/uL    Eosinophils Absolute 0.1 0.0 - 0.4 thou/uL    Basophils Absolute 0.0 0.0 - 0.2 thou/uL   Vitamin B12   Result Value Ref Range    Vitamin B-12 450 213 - 816 pg/mL   Calcium - Moderate Hypercalcemia (Corrected Calcium 12.1-14 mg/dL)   Result Value Ref Range    Calcium  9.4 8.5 - 10.5 mg/dL   Albumin - Moderate Hypercalcemia (Corrected Calcium 12.1-14 mg/dL)   Result Value Ref Range    Albumin 2.7 (L) 3.5 - 5.0 g/dL   Potassium   Result Value Ref Range    Potassium 5.2 (H) 3.5 - 5.0 mmol/L   Calcium, Ionized, Measured   Result Value Ref Range    Calcium, Ionized Measured 1.34 (H) 1.11 - 1.30 mmol/L    Calcium, Ionized pH 7.4 1.33 (H) 1.11 - 1.30 mmol/L    pH 7.39 7.35 - 7.45   BMP   Result Value Ref Range    Sodium 140 136 - 145 mmol/L    Potassium 4.6 3.5 - 5.0 mmol/L    Chloride 109 (H) 98 - 107 mmol/L    CO2 27 22 - 31 mmol/L    Anion Gap, Calculation 4 (L) 5 - 18 mmol/L    Glucose 86 70 - 125 mg/dL    Calcium 9.4 8.5 - 10.5 mg/dL    BUN 8 8 - 28 mg/dL    Creatinine 0.65 0.60 - 1.10 mg/dL    GFR MDRD Af Amer >60 >60 mL/min/1.73m2    GFR MDRD Non Af Amer >60 >60 mL/min/1.73m2   Albumin - Moderate Hypercalcemia (Corrected Calcium 12.1-14 mg/dL)   Result Value Ref Range    Albumin 2.7 (L) 3.5 - 5.0 g/dL   HM1 (CBC with Diff)   Result Value Ref Range    WBC 10.8 4.0 - 11.0 thou/uL    RBC 4.05 3.80 - 5.40 mill/uL    Hemoglobin 9.0 (L) 12.0 - 16.0 g/dL    Hematocrit 31.5 (L) 35.0 - 47.0 %    MCV 78 (L) 80 - 100 fL    MCH 22.2 (L) 27.0 - 34.0 pg    MCHC 28.6 (L) 32.0 - 36.0 g/dL    RDW 18.4 (H) 11.0 - 14.5 %    Platelets 360 140 - 440 thou/uL    MPV 10.5 8.5 - 12.5 fL    Neutrophils % 73 (H) 50 - 70 %    Lymphocytes % 19 (L) 20 - 40 %    Monocytes % 6 2 - 10 %    Eosinophils % 1 0 - 6 %    Basophils % 1 0 - 2 %    Neutrophils Absolute 7.9 (H) 2.0 - 7.7 thou/uL    Lymphocytes Absolute 2.1 0.8 - 4.4 thou/uL    Monocytes Absolute 0.6 0.0 - 0.9 thou/uL    Eosinophils Absolute 0.1 0.0 - 0.4 thou/uL    Basophils Absolute 0.1 0.0 - 0.2 thou/uL   Basic Metabolic Panel   Result Value Ref Range    Sodium 140 136 - 145 mmol/L    Potassium 3.8 3.5 - 5.0 mmol/L    Chloride 105 98 - 107 mmol/L    CO2 28 22 - 31 mmol/L    Anion Gap, Calculation 7 5 - 18 mmol/L    Glucose 98 70 - 125 mg/dL    Calcium  9.9 8.5 - 10.5 mg/dL    BUN 9 8 - 28 mg/dL    Creatinine 0.67 0.60 - 1.10 mg/dL    GFR MDRD Af Amer >60 >60 mL/min/1.73m2    GFR MDRD Non Af Amer >60 >60 mL/min/1.73m2   Magnesium   Result Value Ref Range    Magnesium 2.2 1.8 - 2.6 mg/dL         Assessment:  1. Hypercalcemia     2. Encephalopathy     3. Intractable migraine without aura and without status migrainosus     4. Weight loss     5. Other constipation     6. Trigeminal neuralgia     7. Anemia, unspecified type         Plan:   Hypercalcemia: We will draw a calcium level and monitor.  Encephalopathy: Resolved  Migraine: We will add Excedrin Migraine  Weight loss: Add house supplement daily and monitor appetite  Constipation: Offer prunes and add MiraLAX as needed  Trigeminal neuralgia: Increase Tylenol 3 to 4 times a day  Anemia: Draw CBC          Electronically signed by: Deirdre Reyes, CNP

## 2021-06-18 NOTE — PATIENT INSTRUCTIONS - HE
Patient Instructions by Mara Murillo MD at 6/15/2020 12:30 PM     Author: Mara Murillo MD Service: -- Author Type: Physician    Filed: 6/15/2020  1:03 PM Encounter Date: 6/15/2020 Status: Signed    : Mara Murillo MD (Physician)         Patient Education     Duloxetine delayed-release capsules  Brand Names: [place], Olena  What is this medicine?  DULOXETINE (doo LOX e teen) is used to treat depression, anxiety, and different types of chronic pain.  How should I use this medicine?  Take this medicine by mouth with a glass of water. Follow the directions on the prescription label. Do not cut, crush or chew this medicine. You can take this medicine with or without food. Take your medicine at regular intervals. Do not take your medicine more often than directed. Do not stop taking this medicine suddenly except upon the advice of your doctor. Stopping this medicine too quickly may cause serious side effects or your condition may worsen.  A special MedGuide will be given to you by the pharmacist with each prescription and refill. Be sure to read this information carefully each time.  Talk to your pediatrician regarding the use of this medicine in children. While this drug may be prescribed for children as young as 7 years of age for selected conditions, precautions do apply.  What side effects may I notice from receiving this medicine?  Side effects that you should report to your doctor or health care professional as soon as possible:    allergic reactions like skin rash, itching or hives, swelling of the face, lips, or tongue    anxious    breathing problems    confusion    changes in vision    chest pain    confusion    elevated mood, decreased need for sleep, racing thoughts, impulsive behavior    eye pain    fast, irregular heartbeat    feeling faint or lightheaded, falls    feeling agitated, angry, or irritable    hallucination, loss of contact with reality    high blood pressure    loss of balance or  coordination    palpitations    redness, blistering, peeling or loosening of the skin, including inside the mouth    restlessness, pacing, inability to keep still    seizures    stiff muscles    suicidal thoughts or other mood changes    trouble passing urine or change in the amount of urine    trouble sleeping    unusual bleeding or bruising    unusually weak or tired    vomiting    yellowing of the eyes or skin  Side effects that usually do not require medical attention (report to your doctor or health care professional if they continue or are bothersome):    change in sex drive or performance    change in appetite or weight    constipation    dizziness    dry mouth    headache    increased sweating    nausea    tired  What may interact with this medicine?  Do not take this medicine with any of the following medications:    desvenlafaxine    levomilnacipran    linezolid    MAOIs like Carbex, Eldepryl, Marplan, Nardil, and Parnate    methylene blue (injected into a vein)    milnacipran    thioridazine    venlafaxine  This medicine may also interact with the following medications:    alcohol    amphetamines    aspirin and aspirin-like medicines    certain antibiotics like ciprofloxacin and enoxacin    certain medicines for blood pressure, heart disease, irregular heart beat    certain medicines for depression, anxiety, or psychotic disturbances    certain medicines for migraine headache like almotriptan, eletriptan, frovatriptan, naratriptan, rizatriptan, sumatriptan, zolmitriptan    certain medicines that treat or prevent blood clots like warfarin, enoxaparin, and dalteparin    cimetidine    fentanyl    lithium    NSAIDS, medicines for pain and inflammation, like ibuprofen or naproxen    phentermine    procarbazine    rasagiline    sibutramine    [place]'s wort    theophylline    tramadol    tryptophan  What if I miss a dose?  If you miss a dose, take it as soon as you can. If it is almost time for your next dose,  take only that dose. Do not take double or extra doses.  Where should I keep my medicine?  Keep out of the reach of children.  Store at room temperature between 20 and 25 degrees C (68 to 77 degrees F). Throw away any unused medicine after the expiration date.  What should I tell my health care provider before I take this medicine?  They need to know if you have any of these conditions:    bipolar disorder or a family history of bipolar disorder    glaucoma    kidney disease    liver disease    suicidal thoughts or a previous suicide attempt    taken medicines called MAOIs like Carbex, Eldepryl, Marplan, Nardil, and Parnate within 14 days    an unusual reaction to duloxetine, other medicines, foods, dyes, or preservatives    pregnant or trying to get pregnant    breast-feeding  What should I watch for while using this medicine?  Tell your doctor if your symptoms do not get better or if they get worse. Visit your doctor or health care professional for regular checks on your progress. Because it may take several weeks to see the full effects of this medicine, it is important to continue your treatment as prescribed by your doctor.  Patients and their families should watch out for new or worsening thoughts of suicide or depression. Also watch out for sudden changes in feelings such as feeling anxious, agitated, panicky, irritable, hostile, aggressive, impulsive, severely restless, overly excited and hyperactive, or not being able to sleep. If this happens, especially at the beginning of treatment or after a change in dose, call your health care professional.  You may get drowsy or dizzy. Do not drive, use machinery, or do anything that needs mental alertness until you know how this medicine affects you. Do not stand or sit up quickly, especially if you are an older patient. This reduces the risk of dizzy or fainting spells. Alcohol may interfere with the effect of this medicine. Avoid alcoholic drinks.  This medicine  can cause an increase in blood pressure. This medicine can also cause a sudden drop in your blood pressure, which may make you feel faint and increase the chance of a fall. These effects are most common when you first start the medicine or when the dose is increased, or during use of other medicines that can cause a sudden drop in blood pressure. Check with your doctor for instructions on monitoring your blood pressure while taking this medicine.  Your mouth may get dry. Chewing sugarless gum or sucking hard candy, and drinking plenty of water may help. Contact your doctor if the problem does not go away or is severe.  NOTE:This sheet is a summary. It may not cover all possible information. If you have questions about this medicine, talk to your doctor, pharmacist, or health care provider. Copyright  2018 [employer]

## 2021-06-18 NOTE — LETTER
Letter by Deirdre Reyes CNP at      Author: Deirdre Reyes CNP Service: -- Author Type: --    Filed:  Encounter Date: 2/27/2019 Status: (Other)         Patient: Julisa Chaney   MR Number: [id number]   YOB: 1945   Date of Visit: 2/27/2019     Code Status:  FULL CODE  Visit Type: Discharge Summary     Facility:  [facility] [[id number]]          PCP:  Mara Murillo MD  [phone]       Admission Date to our Facility: 2/9/2019 Discharge Date from our Facility: 2/28/2019    Discharge Diagnosis:    1. Hypercalcemia     2. Trigeminal neuralgia     3. Reflux gastritis     4. Epigastric pain     5. Moderate protein-calorie malnutrition (H)     6. Intractable migraine without aura and without status migrainosus     7. Dry eyes          History of Present Illness: Julisa Chaney is a 73 y.o. female who had a recent hospitalization at [facility] 2/6 to 2/9/19.  She has a past medical history for trigeminal neuralgia and depression.  She had been having ongoing increased fatigue, low motivation, depression and low appetite for the past couple of months.  Prior to his hospitalization she was more sedated and encephalopathic and so was taken to the emergency room and found to be hypercalcemic.  PTH was normal, TSH was normal, vitamin D was low, B12 was normal, and a head CT showed no acute abnormalities.  It was presumed that her hypercalcemia was directly related to excessive intake of calcium Via Tums.  She did report that she does take a lot for indigestion.  She does have issues with chronic anemia and at discharge her hemoglobin was 9 and there was no evidence of bleeding.  During hospitalization she did present with bacterial conjunctivitis and was started on Polytrim drops to her left eye.        Skilled Nursing Facility Course: At the TCU she was able to progress her therapies to be independent with ambulation and all ADLs.  Her hypercalcemia has remained resolved and stable  with her last calcium being 9.4.  Her malnutrition she was treated with oral supplements twice daily and high calorie foods.  She has had some weight gain of approximately 4 pounds while at the TCU.  For her trigeminal neuralgia we have added back her nortriptyline at a lower dose of 50 mg at bedtime.  She reports today her trigeminal neuralgia is back to baseline.  She has also had some issues with sleeping which she states has improved with adding back in the nortriptyline and taking Vistaril x2 nights.  During her stay she had ongoing epigastric pain.  Her hemoglobin on 2/19 went down to 7.9 down from 9 in the hospital.  I started her on omeprazole at 20 mg daily she reports her epigastric pain has resolved.  Guaiacs of stools were negative for blood and she was negative for H. pylori.  Her recheck of hemoglobin today was better at 8.7.  She will need follow-up with her primary provider in regards to her anemia and gastritis.    Discharge Medications:    Current Outpatient Medications   Medication Sig Dispense Refill   ? acetaminophen-codeine (TYLENOL #3) 300-30 mg per tablet TAKE 1 TABLET BY MOUTH THREE TIMES DAILY AS NEEDED FOR PAIN (Patient taking differently: TAKE 1 TABLET BY MOUTH EVERY 6 HOURS AS NEEDED FOR PAIN) 120 tablet 0   ? aspirin-acetaminophen-caffeine (EXCEDRIN MIGRAINE) 250-250-65 mg per tablet Take 1 tablet by mouth every 6 (six) hours as needed for pain.     ? cholecalciferol, vitamin D3, (VITAMIN D3) 2,000 unit Tab One tab daily (Patient taking differently: Take 2,000 Units by mouth daily. One tab daily      ) 90 tablet 3   ? ferrous sulfate 325 (65 FE) MG tablet Take 1 tablet by mouth 2 (two) times a day.     ? multivitamin (ONE A DAY) per tablet Take 1 tablet by mouth daily. 120 tablet 2   ? nortriptyline (PAMELOR) 10 MG capsule Take 50 mg by mouth at bedtime.     ? omeprazole (PRILOSEC) 20 MG capsule Take 20 mg by mouth daily before breakfast.     ? OXcarbazepine (TRILEPTAL) 150 MG tablet  Take 3 tablets (450 mg total) by mouth at bedtime. 270 tablet 3   ? polyvinyl alcohol (LIQUIFILM TEARS) 1.4 % ophthalmic solution Administer 2 drops to both eyes as needed for dry eyes.     ? sertraline (ZOLOFT) 25 MG tablet Take 1 tablet (25 mg total) by mouth daily. 90 tablet 0   ? topiramate (TOPAMAX) 50 MG tablet Take 1 tablet (50 mg total) by mouth daily. 90 tablet 3     No current facility-administered medications for this visit.        For most current and accurate medication list, please contact the skilled nursing facility that this patient visit took place at.      Discharge Plan: Discharge to home with family checks.  She will need to follow-up with PCP within 7-10 days of being discharged from TCU.  She will need follow-up hemoglobin and gastritis check after starting omeprazole.    Review of Systems   Patient denies fever, chills, headache, lightheadedness, dizziness, rhinorrhea, cough, congestion, shortness of breath, chest pain, palpitations, abdominal pain, n/v, diarrhea, constipation, change in appetite, dysuria, frequency, burning or pain with urination.  Other than stated in HPI all other review of systems is negative.         Physical Exam   Vital signs: /50, heart rate 66, respiratory 18, temp 97.5, weight 95.1 pounds  GENERAL APPEARANCE: Thin, cachectic frail elderly female in no acute distress.  HEENT: normocephalic, atraumatic  PERRL, sclerae anicteric, conjunctivae clear, EOM intact  Wears dentures.  LUNGS: Lung sounds CTA, no adventitious sounds, respiratory effort normal.  CARD: RRR, S1, S2, without murmurs, gallops, rubs, no JVD,  ABD: Soft and nontender with normal bowel sounds.  She does have audible heart sounds and her abdomen however she does not have aortic pulsation noted.  MSK: Muscle strength and tone were normal.  EXTREMITIES: No cyanosis, clubbing or edema.  NEURO: Alert and oriented x 3. Normal affect.Face is symmetric.  SKIN: Inspection of the skin reveals no rashes,  ulcerations or petechiae.  PSYCH: euthymic              Labs:    Recent Results (from the past 240 hour(s))   Albumin   Result Value Ref Range    Albumin 2.7 (L) 3.5 - 5.0 g/dL   Ferritin   Result Value Ref Range    Ferritin 6 (L) 10 - 130 ng/mL   Prealbumin   Result Value Ref Range    Prealbumin 17.7 (L) 19.0 - 38.0 mg/dL   HM1 (CBC with Diff)   Result Value Ref Range    WBC 6.4 4.0 - 11.0 thou/uL    RBC 3.67 (L) 3.80 - 5.40 mill/uL    Hemoglobin 7.9 (L) 12.0 - 16.0 g/dL    Hematocrit 28.3 (L) 35.0 - 47.0 %    MCV 77 (L) 80 - 100 fL    MCH 21.5 (L) 27.0 - 34.0 pg    MCHC 27.9 (L) 32.0 - 36.0 g/dL    RDW 19.2 (H) 11.0 - 14.5 %    Platelets 585 (H) 140 - 440 thou/uL    MPV 9.4 8.5 - 12.5 fL    Neutrophils % 39 (L) 50 - 70 %    Lymphocytes % 50 (H) 20 - 40 %    Monocytes % 8 2 - 10 %    Eosinophils % 3 0 - 6 %    Basophils % 1 0 - 2 %    Neutrophils Absolute 2.5 2.0 - 7.7 thou/uL    Lymphocytes Absolute 3.2 0.8 - 4.4 thou/uL    Monocytes Absolute 0.5 0.0 - 0.9 thou/uL    Eosinophils Absolute 0.2 0.0 - 0.4 thou/uL    Basophils Absolute 0.1 0.0 - 0.2 thou/uL   Hemoglobin   Result Value Ref Range    Hemoglobin 7.9 (L) 12.0 - 16.0 g/dL   H. pylori Antigen, Stool   Result Value Ref Range    Specimen Description Feces     H pylori Antigen SEE NOTES     Report Status FINAL 02/22/2019    HM1 (CBC with Diff)   Result Value Ref Range    WBC 6.0 4.0 - 11.0 thou/uL    RBC 3.81 3.80 - 5.40 mill/uL    Hemoglobin 8.7 (L) 12.0 - 16.0 g/dL    Hematocrit 30.6 (L) 35.0 - 47.0 %    MCV 80 80 - 100 fL    MCH 22.8 (L) 27.0 - 34.0 pg    MCHC 28.4 (L) 32.0 - 36.0 g/dL    RDW 22.8 (H) 11.0 - 14.5 %    Platelets 412 140 - 440 thou/uL    MPV 9.4 8.5 - 12.5 fL    Neutrophils % 38 (L) 50 - 70 %    Lymphocytes % 50 (H) 20 - 40 %    Monocytes % 8 2 - 10 %    Eosinophils % 4 0 - 6 %    Basophils % 1 0 - 2 %    Neutrophils Absolute 2.3 2.0 - 7.7 thou/uL    Lymphocytes Absolute 3.0 0.8 - 4.4 thou/uL    Monocytes Absolute 0.5 0.0 - 0.9 thou/uL     Eosinophils Absolute 0.2 0.0 - 0.4 thou/uL    Basophils Absolute 0.1 0.0 - 0.2 thou/uL   Manual Differential   Result Value Ref Range    Platelet Estimate Normal Normal    Ovalocytes 1+ (!) Negative    Polychromasia 1+ (!) Negative         Assessment:  1. Hypercalcemia     2. Trigeminal neuralgia     3. Reflux gastritis     4. Epigastric pain     5. Moderate protein-calorie malnutrition (H)     6. Intractable migraine without aura and without status migrainosus     7. Dry eyes         MEDICAL EQUIPMENT NEEDS:  none      DISCHARGE PLAN/FACE TO FACE:  N/A    Electronically signed by: Deirdre Reyes, CNP

## 2021-06-18 NOTE — LETTER
Letter by Deirdre Reyes CNP at      Author: Deirdre Reyes CNP Service: -- Author Type: --    Filed:  Encounter Date: 2/19/2019 Status: (Other)         Patient: Julisa Chaney   MR Number: [id number]   YOB: 1945   Date of Visit: 2/19/2019     Code Status:  FULL CODE  Visit Type: Follow Up     Facility:  [facility] [[id number]]        Facility Type: SNF (Skilled Nursing Facility, TCU)    History of Present Illness: Julisa Chaney is a 73 y.o. female seen for problem visit per nursing request.  She had a recent hospitalization at [facility] 2/6 to 2/9/19.  She has a past medical history for trigeminal neuralgia and depression.  She had been having ongoing increased fatigue, low motivation, depression and low appetite for the past couple of months.  Prior to his hospitalization she was more sedated and encephalopathic and so was taken to the emergency room and found to be hypercalcemic.  PTH was normal, TSH was normal, vitamin D was low, B12 was normal, and a head CT showed no acute abnormalities.  It was presumed that her hypercalcemia was directly related to excessive intake of calcium Via Tums.  She did report that she does take a lot for indigestion.  She does have issues with chronic anemia and at discharge her hemoglobin was 9 and there was no evidence of bleeding.  During hospitalization she did present with bacterial conjunctivitis and was started on Polytrim drops to her left eye.    Today, she reports that her sleeping at night has improved along with her GERD.  She continues to have mattery in her eyes with itching.  The sclera is white.  Her labs showed severe malnutrition with a prealbumin 17.7 and albumin 2.7.  She reports she is taking the supplements.  She does show a 2 pound weight loss from her admission here.  However she states her hospital weight was 88 pounds and her admission weight here was 92 pounds.  So likely she has improved her weight from her  hospitalization.  Her ferritin level was low and her iron supplement was increased to twice daily.  She reports tolerating this well and does not complain of an upset stomach.  However, she does have ongoing issues with constipation and reports she finally had a bowel movement today using the as needed MiraLAX.  She reports when she is constipated at home she gets good relief with enemas.  Hemoglobin today was 7.9 which is down from 9.9 last week.  She denies any symptoms of dizziness, headaches or lightheadedness.    Review of Systems   Patient denies fever, chills, headache, lightheadedness, dizziness, rhinorrhea, cough, congestion, shortness of breath, chest pain, palpitations, abdominal pain, n/v, diarrhea,  change in appetite, dysuria, frequency, burning or pain with urination.  Other than stated in HPI all other review of systems is negative.         Physical Exam    Vital signs: /46, heart rate 66, respiratory 18, temp 97.9.  GENERAL APPEARANCE: Thin, cachectic frail elderly female in no acute distress.  HEENT: normocephalic, atraumatic  PERRL, sclerae anicteric, conjunctivae clear, some mattery on the eyelashes of bilateral eyes inner eyelids is pale EOM intact  Wears dentures.  NECK: Supple and symmetric. Trachea is midline, no thyromegaly, no adenopathy, and no tenderness  LUNGS: Lung sounds CTA, no adventitious sounds, respiratory effort normal.  CARD: RRR, S1, S2, without murmurs, gallops, rubs, no JVD,  ABD: Soft and nontender with normal bowel sounds.  She does have audible heart sounds and her abdomen however she does not have aortic pulsation noted.  MSK: Muscle strength and tone were normal.  EXTREMITIES: No cyanosis, clubbing or edema.  NEURO: Alert and oriented x 3. Normal affect.Face is symmetric.  SKIN: Inspection of the skin reveals no rashes, ulcerations or petechiae.  PSYCH: euthymic          Labs:    Recent Results (from the past 240 hour(s))   Basic Metabolic Panel   Result Value Ref  Range    Sodium 140 136 - 145 mmol/L    Potassium 3.8 3.5 - 5.0 mmol/L    Chloride 105 98 - 107 mmol/L    CO2 28 22 - 31 mmol/L    Anion Gap, Calculation 7 5 - 18 mmol/L    Glucose 98 70 - 125 mg/dL    Calcium 9.9 8.5 - 10.5 mg/dL    BUN 9 8 - 28 mg/dL    Creatinine 0.67 0.60 - 1.10 mg/dL    GFR MDRD Af Amer >60 >60 mL/min/1.73m2    GFR MDRD Non Af Amer >60 >60 mL/min/1.73m2   Magnesium   Result Value Ref Range    Magnesium 2.2 1.8 - 2.6 mg/dL   Calcium   Result Value Ref Range    Calcium 9.4 8.5 - 10.5 mg/dL   HM1 (CBC with Diff)   Result Value Ref Range    WBC 6.6 4.0 - 11.0 thou/uL    RBC 4.61 3.80 - 5.40 mill/uL    Hemoglobin 9.9 (L) 12.0 - 16.0 g/dL    Hematocrit 36.0 35.0 - 47.0 %    MCV 78 (L) 80 - 100 fL    MCH 21.5 (L) 27.0 - 34.0 pg    MCHC 27.5 (L) 32.0 - 36.0 g/dL    RDW 19.0 (H) 11.0 - 14.5 %    Platelets 595 (H) 140 - 440 thou/uL    MPV 11.0 8.5 - 12.5 fL    Neutrophils % 47 (L) 50 - 70 %    Lymphocytes % 42 (H) 20 - 40 %    Monocytes % 7 2 - 10 %    Eosinophils % 3 0 - 6 %    Basophils % 1 0 - 2 %    Neutrophils Absolute 3.1 2.0 - 7.7 thou/uL    Lymphocytes Absolute 2.8 0.8 - 4.4 thou/uL    Monocytes Absolute 0.5 0.0 - 0.9 thou/uL    Eosinophils Absolute 0.2 0.0 - 0.4 thou/uL    Basophils Absolute 0.1 0.0 - 0.2 thou/uL   Albumin   Result Value Ref Range    Albumin 2.7 (L) 3.5 - 5.0 g/dL   Ferritin   Result Value Ref Range    Ferritin 6 (L) 10 - 130 ng/mL   Prealbumin   Result Value Ref Range    Prealbumin 17.7 (L) 19.0 - 38.0 mg/dL   HM1 (CBC with Diff)   Result Value Ref Range    WBC 6.4 4.0 - 11.0 thou/uL    RBC 3.67 (L) 3.80 - 5.40 mill/uL    Hemoglobin 7.9 (L) 12.0 - 16.0 g/dL    Hematocrit 28.3 (L) 35.0 - 47.0 %    MCV 77 (L) 80 - 100 fL    MCH 21.5 (L) 27.0 - 34.0 pg    MCHC 27.9 (L) 32.0 - 36.0 g/dL    RDW 19.2 (H) 11.0 - 14.5 %    Platelets 585 (H) 140 - 440 thou/uL    MPV 9.4 8.5 - 12.5 fL    Neutrophils % 39 (L) 50 - 70 %    Lymphocytes % 50 (H) 20 - 40 %    Monocytes % 8 2 - 10 %     Eosinophils % 3 0 - 6 %    Basophils % 1 0 - 2 %    Neutrophils Absolute 2.5 2.0 - 7.7 thou/uL    Lymphocytes Absolute 3.2 0.8 - 4.4 thou/uL    Monocytes Absolute 0.5 0.0 - 0.9 thou/uL    Eosinophils Absolute 0.2 0.0 - 0.4 thou/uL    Basophils Absolute 0.1 0.0 - 0.2 thou/uL         Assessment:  1. Moderate protein-calorie malnutrition (H)     2. Trigeminal neuralgia     3. Reflux gastritis     4. Anemia, unspecified type     5. Hypercalcemia     6. Other constipation     7. Acute conjunctivitis of both eyes, unspecified acute conjunctivitis type         Plan:   Malnutrition: Counseled patient on importance of adequate nutrition and ingesting supplements daily.  Trigeminal neuralgia: Patient reports this is improved with the restarting of nortriptyline.  Gastritis: Patient reports this is improved with omeprazole.  However her hemoglobin has dropped 2 g in the past week so we will test her her for H. pylori stool antigen for possible ulcer.  Anemia: Recheck hemoglobin on Thursday continue ferrous sulfate twice daily.  Also guaiac stools x3  Hypercalcemia: Resolved  Constipation: Add bisacodyl enema as needed daily for constipation.  Conjunctivitis: This is improved with the polymyxin drops however I am guessing this is allergic conjunctivitis and will eventually need an antihistamine drop in order to have long-term improvement.  Will continue to monitor at this time.        35 minutes was spent face-to-face with patient discussing current clinical status, medication review with side effects and response, lab review and discussion of plan of care regarding malnutrition, anemia and conjunctivitis    Electronically signed by: Deirdre Reyes CNP

## 2021-06-19 NOTE — PROGRESS NOTES
Scheduled Follow Up Call: Attempt 3  Care Guide called and left a message for the patient.  If the patient is returning my call, please transfer her to me, Dora Brittany, at [phone].  I have called the patient 3 times over the past few weeks and have been unsuccessful in reaching her.  The patient has not returned any of my messages.  I will continue attempting to reach out to the patient in one month.  I will also check the patient's chart for upcoming appointments, ER reports, or any other recent activity that may contain a new phone number.    RN Assessment History:  3/2/18--cancelled by patient    Plan for Next Outreach:  1.  Discuss rescheduling the RN Assessment  2.  Dr. Murillo would like her to follow up around 1/9/19 for a recheck  3.  On 7/9/18, Dr. Murillo ordered Cologuard, verify this is completed    Pending Appointments:  8/15/18 at 1:00 for Dexa Scan @ [facility]  ___________________  Next Outreach Date: 9/10/18  Planned Outreach Frequency: monthly  Preferred Phone Number: [phone]    RN Assessment Date: DUE   Goal Setting Date: 1/23/15    Chronic Medical Diagnosis/Physical Health Needs:  Osteopenia  Hyperlipidemia  Migraine Headache  Trigeminal Neuralgia  Acute Respiratory Failure  Underweight  Protein-Calorie Malnutrition, Severe  Shortness of Breath  COPD    Medications:  Medication Management: Independent  Pharmacy: [facility] off [address] in [place]    Preventative Measures:  Medical Insurance: Blue Cross Blue Shield Platinum Blue  ID: [id number]    Medicare Part A and B  ID: [id number]    Annual Physical Exam: 5/10/18  Mammogram: 6/12/18  Dexa Scan: DUE (pending 8/15/18)  Colonoscopy: 5/3/06 DUE  (cologuard ordered 7/9/18)  Fall Risk Assessment: 5/10/18  Advance Directives: On File  Flu Shot: 9/8/16  Td Shot: 5/10/18  Zostavax Shot: 11/7/07  PCV-13 Shot: 10/29/15  Pneumovax Shot: 10/10/14    Mental Health Diagnosis/Needs:   Anxiety    Mental Health Provider:    Unknown    Hygiene:  Independent    Rest/Sleep:  Unknown    Nutrition:  Meals on Wheels  Boost once a day  Has had at least 6 teeth extracted, may have had more    Substance/CD:  Use of Cigarettes: Quit in 2014  Second Hand Smoke Exposure: None  Alcohol Use: None  Street Drugs: None    Family Planning:  NA    Employment/Education:  Unknown    Housing:  House    Interpersonal:      Household Members:  Self    Safety:  Hoarding tendencies    Daily Activities/Exercise:  Scrapbooking    Social Network:  Daughter lives close by  2 sisters live in the area    [employer]ist/Spiritual:  Unknown    Transportation:  Does not drive  Daughter drives her to appointments     Financial/Expenses:  Has a 401K    Understanding of Health and Wellbeing/Barriers:  Unknown    Current Services/Support:  Meals on Wheels

## 2021-06-19 NOTE — PROGRESS NOTES
Assessment/plan   Julisa Chaney is a 73 y.o. female who is New  patient to my practice here with   Chief Complaint   Patient presents with     Establish Care     Med Check     refills        Julisa was seen today for establish care and med check.    Diagnoses and all orders for this visit:    Encounter to establish care    Osteopenia  -     DXA Bone Density Scan; Future    Migraine    Trigeminal neuralgia  -     nortriptyline (PAMELOR) 25 MG capsule; TAKE 4 CAPSULES (100 MG    TOTAL) AT BEDTIME    Screen for colon cancer  -     Ambulatory referral for Colonoscopy    Underweight  Comments:  eating mostly TV dinners , also on topamax which also causing wt loss    Intractable chronic migraine without aura and without status migrainosus  -     topiramate (TOPAMAX) 50 MG tablet; Take 1 tablet (50 mg total) by mouth 2 (two) times a day.    Facial neuralgia  -     OXcarbazepine (TRILEPTAL) 150 MG tablet; 3 tab at bedtime    Controlled substance agreement signed    Trigeminal nerve disorder  -     acetaminophen-codeine (TYLENOL #3) 300-30 mg per tablet; Take 1 tablet by mouth every 6 (six) hours as needed for pain.    Other orders  -     cholecalciferol, vitamin D3, (VITAMIN D3) 2,000 unit Tab; One tab daily  -     multivitamin (ONE A DAY) per tablet; Take 1 tablet by mouth daily.    Does not need inhaler any more   Agree to do dexa and cologuard   Refill done   meds updated   Follow up 6 month       Subjective:      HPI: Julisa Chaney is a 73 y.o. female is here to establish care and refill med    Trigeminal Neuralgia : stable on current treatment plan , taking topamax on once 50 mg daily . Also adv on cut down the dose of tylenol #3    Underweight : added vit D and MVI she had recent ECHO which was normal and chest CT which showed mild emphysema and lung mass which resolve on Follow up most likely lobar pneumonia   Does not need inhaler any more   Agree to do dexa and cologuard             Past Medical History:  "  Diagnosis Date     High cholesterol      Migraines      Trigeminal neuralgia      Past Surgical History:   Procedure Laterality Date     TN TOTAL ABDOM HYSTERECTOMY      Description: Total Abdominal Hysterectomy;  Recorded: 08/17/2010;     Penicillins  Current Outpatient Prescriptions   Medication Sig Dispense Refill     acetaminophen-codeine (TYLENOL #3) 300-30 mg per tablet Take 1 tablet by mouth every 6 (six) hours as needed for pain. 120 tablet 0     nortriptyline (PAMELOR) 25 MG capsule TAKE 4 CAPSULES (100 MG    TOTAL) AT BEDTIME 360 capsule 2     OXcarbazepine (TRILEPTAL) 150 MG tablet 3 tab at bedtime 270 tablet 0     topiramate (TOPAMAX) 50 MG tablet Take 1 tablet (50 mg total) by mouth 2 (two) times a day. 180 tablet 4     cholecalciferol, vitamin D3, (VITAMIN D3) 2,000 unit Tab One tab daily 90 tablet 3     multivitamin (ONE A DAY) per tablet Take 1 tablet by mouth daily. 120 tablet 2     No current facility-administered medications for this visit.      Family History   Problem Relation Age of Onset     Cancer Father      Testicular cancer Grandchild 17     Breast cancer Mother      Breast cancer Sister      Breast cancer Maternal Aunt      Breast cancer Sister        Patient Active Problem List   Diagnosis     Osteopenia     Hyperlipidemia     Migraine Headache     Trigeminal neuralgia     Underweight     Protein-calorie malnutrition, severe (H)     Counseling regarding advanced directives and goals of care     Controlled substance agreement signed       Review of Systems   12 point comprehensive review of systems was negative except as noted and HPI     Social History     Social History Narrative    Lives alone in the house, relay in TV dinner    grandkids help with house maintenance    Daughter lives close by.    Mara Murillo MD 7/9/2018 1:49 PM         Objective:     Vitals:    07/09/18 1316   BP: 98/54   Pulse: 80   Weight: 110 lb (49.9 kg)   Height: 5' 5\" (1.651 m)       Physical Exam: "     General: Alert, no acute distress.   HEENT: normocephalic conjunctivae are clear, Normal pearly TMs bilaterally without erythema, pus or fluid. Position and landmarks are normal.  Nose is clear.  Oropharynx is moist and clear, without tonsillar hypertrophy, asymmetry, exudate or lesions.  Neck: supple without adenopathy or thyromegaly.  Lungs: Good aeration bilaterally. No prolongation of expiratory phase.   No tachypnea, retractions, or increased work of breathing. Clear to auscultation without wheezes, rales or rhonci.    Heart: regular rate and rhythm, normal S1 and S2, no murmurs  Abdomen: soft and nontender, bowel sounds are present, no hepatosplenomegaly or mass palpable.  Skin: clear without rash or lesions  Neuro: alert, interactive moving all extremities equally, normal muscle tone in all 4 extremities, deep tendon reflexes 2+ symmetrically at the patella      Mara Murillo MD

## 2021-06-19 NOTE — LETTER
Letter by Elmer Reece CHW at      Author: Elmer Reece CHW Service: -- Author Type: --    Filed:  Encounter Date: 7/18/2019 Status: (Other)       Dear Julisa,                                                                        On 11/08/18 you enrolled with the [facility]'s Clinic Care Coordinatination Services.  In order for the Clinic Care Coordination team to provide guidance in completing the goals and actions steps you have established, you and I agreed we would be in contact every 6 weeks.                                  We last spoke on 05/03/19 in order to follow up on goals and action steps.  Since then, I have tried calling you 2 times in the last two weeks and have been unsuccesful in reaching you.              Please call me at [phone] at your earliest convenience.  If you reach my voicemail, please leave a message with your daytime telephone number and a date and time that I can return your call.                                                                            Sincerely,                                                                         ANALISA Hartmann                                                                     Clinic Care Coordination                                          [facility]                                                Phone: [phone]

## 2021-06-19 NOTE — PROGRESS NOTES
Scheduled Follow Up Call: Attempt 2  Care Guide called the patient.  She answered; however, stated she wasn't available to talk.    If the patient is returning my call, please transfer her to me, Dora Soto, at [phone].    RN Assessment History:  3/2/18--cancelled by patient    Plan for Next Outreach:  1.  Discuss rescheduling the RN Assessment  2.  Dr. Murillo would like her to follow up around 1/9/19 for a recheck  3.  On 7/9/18, Dr. Murillo ordered Cologuard, verify this is completed    Pending Appointments:  8/15/18 at 1:00 for Dexa Scan @ [facility]  ___________________  Next Outreach Date: 8/8/18  Planned Outreach Frequency: monthly  Preferred Phone Number: [phone]    RN Assessment Date: DUE   Goal Setting Date: 1/23/15    Chronic Medical Diagnosis/Physical Health Needs:  Osteopenia  Hyperlipidemia  Migraine Headache  Trigeminal Neuralgia  Acute Respiratory Failure  Underweight  Protein-Calorie Malnutrition, Severe  Shortness of Breath  COPD    Medications:  Medication Management: Independent  Pharmacy: [facility]    Preventative Measures:  Medical Insurance: Blue Cross Blue Shield Platinum Blue  ID: [id number]    Medicare Part A and B  ID: [id number]    Annual Physical Exam: 5/10/18  Mammogram: 6/12/18  Dexa Scan: DUE (pending 7/24/18)  Colonoscopy: 5/3/06 DUE  (cologuard ordered 7/9/18)  Fall Risk Assessment: 5/10/18  Advance Directives: On File  Flu Shot: 9/8/16  Td Shot: 5/10/18  Zostavax Shot: 11/7/07  PCV-13 Shot: 10/29/15  Pneumovax Shot: 10/10/14    Mental Health Diagnosis/Needs:   Anxiety    Mental Health Provider:   Unknown    Hygiene:  Independent    Rest/Sleep:  Unknown    Nutrition:  Meals on Wheels  Boost once a day  Has had at least 6 teeth extracted, may have had more    Substance/CD:  Use of Cigarettes: Quit in 2014  Second Hand Smoke Exposure: None  Alcohol Use: None  Street Drugs: None    Family  Planning:  NA    Employment/Education:  Unknown    Housing:  House    Interpersonal:      Household Members:  Self    Safety:  Hoarding tendencies    Daily Activities/Exercise:  Scrapbooking    Social Network:  Daughter lives close by  2 sisters live in the area    Holiness/Spiritual:  Unknown    Transportation:  Does not drive  Daughter drives her to appointments     Financial/Expenses:  Has a 401K    Understanding of Health and Wellbeing/Barriers:  Unknown    Current Services/Support:  Meals on Wheels

## 2021-06-19 NOTE — PROGRESS NOTES
Scheduled Follow Up Call: Attempt 1  Care Guide called and left a message for the patient.  If the patient is returning my call, please transfer her to me, Dora Soto, at [phone].    RN Assessment History:  3/2/18--cancelled by patient    Plan for Next Outreach:  1.  Discuss rescheduling the RN Assessment  2.  Dr. Murillo would like her to follow up around 1/9/19 for a recheck  3.  On 7/9/18, Dr. Murillo ordered Cologuard, verify this is completed    Pending Appointments:  7/24/18 at 1:00 for Dexa Scan @ [facility]  ___________________  Next Outreach Date: 7/31/18  Planned Outreach Frequency: monthly  Preferred Phone Number: [phone]    RN Assessment Date: DUE   Goal Setting Date: 1/23/15    Chronic Medical Diagnosis/Physical Health Needs:  Osteopenia  Hyperlipidemia  Migraine Headache  Trigeminal Neuralgia  Acute Respiratory Failure  Underweight  Protein-Calorie Malnutrition, Severe  Shortness of Breath  COPD    Medications:  Medication Management: Independent  Pharmacy: [facility]    Preventative Measures:  Medical Insurance: Blue Cross Blue Shield Platinum Blue  ID: [id number]    Medicare Part A and B  ID: [id number]    Annual Physical Exam: 5/10/18  Mammogram: 6/12/18  Dexa Scan: DUE (pending 7/24/18)  Colonoscopy: 5/3/06 DUE  (cologuard ordered 7/9/18)  Fall Risk Assessment: 5/10/18  Advance Directives: On File  Flu Shot: 9/8/16  Td Shot: 5/10/18  Zostavax Shot: 11/7/07  PCV-13 Shot: 10/29/15  Pneumovax Shot: 10/10/14    Mental Health Diagnosis/Needs:   Anxiety    Mental Health Provider:   Unknown    Hygiene:  Independent    Rest/Sleep:  Unknown    Nutrition:  Meals on Wheels  Boost once a day  Has had at least 6 teeth extracted, may have had more    Substance/CD:  Use of Cigarettes: Quit in 2014  Second Hand Smoke Exposure: None  Alcohol Use: None  Street Drugs: None    Family Planning:  NA    Employment/Education:  Unknown    Housing:  House    Interpersonal:      Household  Members:  Self    Safety:  Hoarding tendencies    Daily Activities/Exercise:  Scrapbooking    Social Network:  Daughter lives close by  2 sisters live in the area    Druze/Spiritual:  Unknown    Transportation:  Does not drive  Daughter drives her to appointments     Financial/Expenses:  Has a 401K    Understanding of Health and Wellbeing/Barriers:  Unknown    Current Services/Support:  Meals on Wheels

## 2021-06-20 NOTE — PROGRESS NOTES
"Second Monthly Follow Up Call:  Care Guide attempted to call the patient.  The voicemail beeped several times and then stated \"memory full.\"  I was unable to leave a message.  If the patient is returning my call, please transfer her to me, Dora Damonnikita, at [phone].    I have called the patient and have been unsuccessful in reaching her for 2 months now.  The patient has not returned any of my messages.    I will continue attempting to reach out to the patient in one month.  I will also check the patient's chart for upcoming appointments, ER reports, or any other recent activity that may contain a new phone number.    RN Assessment History:  3/2/18--cancelled by patient    Plan for Next Outreach:  1.  Discuss rescheduling the RN Assessment  2.  Dr. Murillo would like her to follow up around 1/9/19 for a recheck  3.  On 7/9/18, Dr. Murillo ordered Cologuard, verify this has been completed    Pending Appointments:  9/13/18 at 1:30 for Dexa Scan @ [facility]  ___________________  Next Outreach Date: 10/12/18  Planned Outreach Frequency: monthly  Preferred Phone Number: [phone]    RN Assessment Date: DUE   Goal Setting Date: 1/23/15    Chronic Medical Diagnosis/Physical Health Needs:  Osteopenia  Hyperlipidemia  Migraine Headache  Trigeminal Neuralgia  Acute Respiratory Failure  Underweight  Protein-Calorie Malnutrition, Severe  Shortness of Breath  COPD    Medications:  Medication Management: Independent  Pharmacy: [facility] off of [address] in [place]    Preventative Measures:  Medical Insurance: Blue Cross Blue Shield Platinum Blue  ID: [id number]    Medicare Part A and B  ID: [id number]    Annual Physical Exam: 5/10/18  Mammogram: 6/12/18  Dexa Scan: DUE (pending 8/15/18)  Colonoscopy: 5/3/06 DUE  (cologuard ordered 7/9/18)  Fall Risk Assessment: 5/10/18  Advance Directives: On File  Flu Shot: 9/8/16  Td Shot: 5/10/18  Zostavax Shot: 11/7/07  PCV-13 Shot: 10/29/15  Pneumovax Shot: " 10/10/14    Mental Health Diagnosis/Needs:   Anxiety    Mental Health Provider:   Unknown    Hygiene:  Independent    Rest/Sleep:  Unknown    Nutrition:  Meals on Wheels  Boost once a day  Has had at least 6 teeth extracted, may have had more    Substance/CD:  Use of Cigarettes: Quit in 2014  Second Hand Smoke Exposure: None  Alcohol Use: None  Street Drugs: None    Family Planning:  NA    Employment/Education:  Unknown    Housing:  House    Interpersonal:      Household Members:  Self    Safety:  Hoarding tendencies    Daily Activities/Exercise:  Scrapbooking    Social Network:  Daughter lives close by  2 sisters live in the area    Tenriism/Spiritual:  Unknown    Transportation:  Does not drive  Daughter drives her to appointments     Financial/Expenses:  Has a 401K    Understanding of Health and Wellbeing/Barriers:  Unknown    Current Services/Support:  Meals on Wheels

## 2021-06-20 NOTE — LETTER
Letter by Claudia Valerio at      Author: Claudia Valerio Service: -- Author Type: --    Filed:  Encounter Date: 6/30/2020 Status: (Other)         Julisa Chaney  [address]  [place] [address]           06/30/20       Dear Julisa:      At [facility], we care about your health and well-being.  Your primary care provider is committed to ensuring you receive high quality care and has chosen a network of specialists to assist in providing that care.  Recently Dr. Murillo referred you to [facility] for a bone density scan.    It is important to your overall health to follow through with the referral from your care provider.  Please call [facility] scheduling [phone] at your earliest convenience for assistance in scheduling an appointment with the recommended specialist.  If you have already scheduled an appointment, please disregard this notice.  Thank you for choosing the [facility] for your healthcare needs.        Sincerely,        Electronically signed by Claudia Valerio      Referral Coordinator   [facility]

## 2021-06-21 ENCOUNTER — COMMUNICATION - HEALTHEAST (OUTPATIENT)
Dept: FAMILY MEDICINE | Facility: CLINIC | Age: 76
End: 2021-06-21

## 2021-06-21 DIAGNOSIS — K21.9 GASTROESOPHAGEAL REFLUX DISEASE WITHOUT ESOPHAGITIS: ICD-10-CM

## 2021-06-21 DIAGNOSIS — G50.9 TRIGEMINAL NERVE DISORDER: ICD-10-CM

## 2021-06-21 NOTE — PROGRESS NOTES
Patient completed the RN Assessment on 11/8/18 with Aleshia Moraes CCC RN  No Care Guide delegations provided    I have completed a preliminary updated Care Plan  At next Patient Outreach, I will review it with the patient and finalize it    Plan for Next Outreach:  1.  Discuss getting rescheduled for the Dexa Scan.  No showed x2 and cancelled once.  2.  Review and complete the updated Care Plan, mail a copy to the patient.    Pending Appointments:  11/26/18 at 2:00 with Mell Mejia CCC RN, via Telephone  11/29/18 at 12:00 with Dr. Murillo  ___________________  Next Outreach Date: 12/3/18  Planned Outreach Frequency: monthly  Preferred Phone Number: [phone]    RN Assessment Date: 11/8/18  Goal Setting Date: 1/23/15  Updated Care Plan Date: TBD    Chronic Medical Diagnosis/Physical Health Needs:  Osteopenia  Hyperlipidemia  Migraine Headache  Trigeminal Neuralgia  Underweight  Protein-Calorie Malnutrition, Severe    Medications:  Medication Management: Independent  Pharmacy: [facility] off of [address] in [place]    Preventative Measures:  Medical Insurance: Blue Cross Blue Shield Platinum Blue  ID: [id number]    Medicare Part A and B  ID: [id number]    Annual Physical Exam: 5/10/18  Mammogram: 6/12/18  Dexa Scan: DUE  Colonoscopy: 5/3/06 DUE  (cologuard ordered 10/15/18)  Depression Follow Up: DUE  Fall Risk Assessment: 5/10/18  Advance Directives: On File  Td Shot: 5/10/18  Zostavax Shot: 11/7/07  PCV-13 Shot: 10/29/15  Pneumovax Shot: 10/10/14    Mental Health Diagnosis/Needs:   Anxiety  Moderate, Major Depression    Mental Health Provider:   None    Hygiene:  Independent    Rest/Sleep:  Unknown    Nutrition:  Meals on Wheels once a day  Boost once a day  Has had at least 6 teeth extracted, may have had more  No desire to eat or cook    Substance/CD:  Use of Cigarettes: Quit in 2014  Second Hand Smoke Exposure: None  Alcohol Use: None  Street Drugs: None    Family  Planning:  NA    Employment/Education:  Retired    Housing:  Single Family Home  Bedroom and Bathroom on main level    Interpersonal:      Household Members:  Self    Safety:  Hoarding tendencies  Laundry in basement    Daily Activities/Exercise:  Scrapbooking    Social Network:  Daughter lives close by (recently started a full time job)  2 sisters live in the area  Grandchildren    Mormonism/Spiritual:  Unknown    Transportation:  Does not drive  Daughter and Grandson drive her to appointments, shopping, Rx pickup     Financial/Expenses:  Has a 401K    Understanding of Health and Wellbeing/Barriers:  Health Literacy: Reasonable to good understanding but do not feel able to engage with advice at this time    Current Services/Support:  Meals on Wheels once a day

## 2021-06-21 NOTE — PROGRESS NOTES
Patient was scheduled for a Telephone RN Assessment on 10/18/18 at 2:00 with Aleshia Moraes CCC RN  Alehsia made 2 attempts to reach the patient without success    Scheduled Follow Up Call: Attempt 1  Care Guide attempted to meet with the patient in clinic today; however, she no showed the appointments for Dr. Murillo and me.    If the patient calls, please transfer her to me, Dora Soto, at [phone].    RN Assessment History:  3/2/18--cancelled by patient  10/18/18--NSH1    Plan for Next Outreach:  1.  Dr. Murillo would like the patient to follow up around 11/18/18.  Assist in scheduling an appointment if one isn't already scheduled/completed.  2.  Discuss getting rescheduled for the Dexa Scan.  No showed x2 and cancelled once.    Pending Appointments:  None  ___________________    Care Guide Delegation from Aleshia Moraes CCC RN, on 10/18/18:  Due Date: Within 2 weeks from today's date 10/18/18  Delegation: No Show for RN Appointment. Please Follow unsuccessful outreach, no show standard work.  10/26/18: Patient had a pending appointment to see Dr. Murillo today at 11:40.  I had placed the patient on my schedule as well in order to discuss rescheduling the RN Assessment.  The patient no showed both appointments.  Attempt #1  ___________________  Next Outreach Date: 11/1/18  Planned Outreach Frequency: monthly  Preferred Phone Number: [phone]    RN Assessment Date: DUE  (scheduled for 10/18/18-NS)  Goal Setting Date: 1/23/15    Chronic Medical Diagnosis/Physical Health Needs:  Osteopenia  Hyperlipidemia  Migraine Headache  Trigeminal Neuralgia  Acute Respiratory Failure  Underweight  Protein-Calorie Malnutrition, Severe  Shortness of Breath  COPD    Medications:  Medication Management: Independent  Pharmacy: [facility] off of [address] in [place]    Preventative Measures:  Medical Insurance: Blue Cross Blue Shield Platinum Blue  ID: [id number]    Medicare Part A and B  ID:  [id number]    Annual Physical Exam: 5/10/18  Mammogram: 6/12/18  Dexa Scan: DUE  Colonoscopy: 5/3/06 DUE  (cologuard ordered 10/15/18)  Fall Risk Assessment: 5/10/18  Advance Directives: On File  Flu Shot: 10/15/18  Td Shot: 5/10/18  Zostavax Shot: 11/7/07  PCV-13 Shot: 10/29/15  Pneumovax Shot: 10/10/14    Mental Health Diagnosis/Needs:   Anxiety  Moderate, Major Depression    Mental Health Provider:   None    Hygiene:  Independent    Rest/Sleep:  Unknown    Nutrition:  Meals on Wheels once a day  Boost once a day  Has had at least 6 teeth extracted, may have had more  No desire to eat or cook    Substance/CD:  Use of Cigarettes: Quit in 2014  Second Hand Smoke Exposure: None  Alcohol Use: None  Street Drugs: None    Family Planning:  NA    Employment/Education:  Unknown    Housing:  House    Interpersonal:      Household Members:  Self    Safety:  Hoarding tendencies    Daily Activities/Exercise:  Scrapbooking    Social Network:  Daughter lives close by (recently started a full time job)  2 sisters live in the area    Methodist/Spiritual:  Unknown    Transportation:  Does not drive  Daughter drives her to appointments     Financial/Expenses:  Has a 401K    Understanding of Health and Wellbeing/Barriers:  Unknown    Current Services/Support:  Meals on Wheels once a day

## 2021-06-21 NOTE — PROGRESS NOTES
Care Guide met with the patient for Clinic Care Coordination outreach follow up on goals and action steps while she was in the clinic seeing Dr. Murillo.    Following Up with the Dentist and Completing the Needed Dental Work:  Patient states she has not followed up since her appointment in May  Still needs to get the molds done and then see the dental surgeon    Patient reports having no energy or motivation to do things  She could not remember the name of her dentist  He has his own practice somewhere near the [facility]    Encouraged her to get the appointment for the molds scheduled and to contact me directly if she needs assistance    Follow Up with Dr. Hansen, Pulmonologist, and Follow His Recommendations:  Per Dr. Murillo, since the patient is no longer needing the inhalers and oxygen, she doesn't need to see the Pulmonologist anymore  Deleted this goal    Being More Optimistic so She is Motivated to Do Things:  Patient states she has been feeling down for several months  Hasn't had the motivation to do anything  Hasn't gone to appointments, not providing self care etc.    Dr. Murillo states the patient eats primarily TV dinners and gets meals on wheels  Appetite has been decreased    Dr. Murillo prescribed 2 new medications:  Remeron 15 mg 1 tablet daily  Iron Supplement 1 tablet twice daily    Patient was informed by Dr. Murillo to call if she doesn't notice a difference in her sleep an appetite with the Remeron  Dr. Murillo can then increase the dose    Dr. Murillo would like the patient to follow up with her in about 1 month    Completing the Cologuard Kit:  Patient has the kit at home  Encouraged her to complete it and send it in    RN Assessment:  Discussed that we had her scheduled for the RN Assessment back in March; however, she had called and cancelled it  Explained we need to get the appointment rescheduled  Patient would prefer a telephone appointment, getting to the clinic is difficult because she had to find rides  from others    Assisted in coordinating an RN Assessment with Aleshia Moraes CCC RN, this Thursday, 10/18/18, at 2:00  I informed her Aleshia will call her at 2:00 and to be ready for the call  Patient acknowledged understanding    Patient stated she doesn't usually answer her phone due to telemarketers  I encouraged her to answer it at 2:00 this Thursday since she has the scheduled telephone appointment time    RN Assessment History:  3/2/18--cancelled by patient    Plan for Next Outreach:  1.  Dr. Murillo would like the patient to follow up around 11/18/18.  Assist in scheduling an appointment if one isn't already scheduled/completed.  2.  Discuss getting rescheduled for the Dexa Scan.  No showed x2 and cancelled once.    Pending Appointments:  10/18/18 at 2:00 with Aleshia Moraes CCC RN via Telephone  ___________________  Next Outreach Date: 11/15/18  Planned Outreach Frequency: monthly  Preferred Phone Number: [phone]    RN Assessment Date: DUE  (scheduled for 10/18/18)  Goal Setting Date: 1/23/15    Chronic Medical Diagnosis/Physical Health Needs:  Osteopenia  Hyperlipidemia  Migraine Headache  Trigeminal Neuralgia  Acute Respiratory Failure  Underweight  Protein-Calorie Malnutrition, Severe  Shortness of Breath  COPD    Medications:  Medication Management: Independent  Pharmacy: [facility] off of [address] in [place]    Preventative Measures:  Medical Insurance: Blue Cross Blue Shield Platinum Blue  ID: [id number]    Medicare Part A and B  ID: [id number]    Annual Physical Exam: 5/10/18  Mammogram: 6/12/18  Dexa Scan: DUE  Colonoscopy: 5/3/06 DUE  (cologuard ordered 10/15/18)  Fall Risk Assessment: 5/10/18  Advance Directives: On File  Flu Shot: 10/15/18  Td Shot: 5/10/18  Zostavax Shot: 11/7/07  PCV-13 Shot: 10/29/15  Pneumovax Shot: 10/10/14    Mental Health Diagnosis/Needs:   Anxiety  Moderate, Major Depression    Mental Health Provider:    None    Hygiene:  Independent    Rest/Sleep:  Unknown    Nutrition:  Meals on Wheels once a day  Boost once a day  Has had at least 6 teeth extracted, may have had more  No desire to eat or cook    Substance/CD:  Use of Cigarettes: Quit in 2014  Second Hand Smoke Exposure: None  Alcohol Use: None  Street Drugs: None    Family Planning:  NA    Employment/Education:  Unknown    Housing:  House    Interpersonal:      Household Members:  Self    Safety:  Hoarding tendencies    Daily Activities/Exercise:  Scrapbooking    Social Network:  Daughter lives close by (recently started a full time job)  2 sisters live in the area    Presybeterian/Spiritual:  Unknown    Transportation:  Does not drive  Daughter drives her to appointments     Financial/Expenses:  Has a 401K    Understanding of Health and Wellbeing/Barriers:  Unknown    Current Services/Support:  Meals on Wheels once a day

## 2021-06-21 NOTE — PROGRESS NOTES
"[facility] CHETNA called the pt at scheduled phone visit time of 2:00 PM to follow up from previous phone visit and was able to connect.    The pt had reported she did not look at the plans that the [employer]  had mailed her and was not sure where they were. The pt asked SW if SW had \"picked one\" for her. CHETNA explained to the pt that SW cannot pick a plan for her and was calling to review with the pt today to possibly assist with any questions she had about her options.     The pt reported she has not been feeling well lately, has not been looking at the mail but that she has gotten a lot of mail about Medicare. CHETNA offered to mail SW the list of options from the Medicare.gov website in pt's area--total of 29 plans in pt's area. Pt agreeable to this but did not want the info for all of the plans, just the first few on the list--list organized by lowest estimated cost to highest.    CHETNA printed off the list and the specific information for the first 6 plans on the list---info detailing the cost of meds, cost of inpatient, etc. For the pt to compare these first 6 plans. CHETNA noted on the larger list that only the first 6 plans have additional information included in the packet CHETNA mailed.     CHETNA informed pt to call the CG if she has any questions regarding how to sign up for a plan, CHETNA can assist with this need. The pt reported she understood.  "

## 2021-06-21 NOTE — PROGRESS NOTES
Care Guide Delegation:  Due Date: Within 2 weeks from today's date 10/18/19  Delegation: No Show for RN Appointment. Please Follow unsuccessful outreach, no show standard work.    Please call to reschedule.  Called patient twice and left 2 voice mails.  Once at 2:00 and a 2nd time @ 2:20.    Thank you,

## 2021-06-21 NOTE — PROGRESS NOTES
Appointment with Mell Mejia [facility] SW, scheduled for 11/8/18 at 2:00    1) Visit type: Medicare Part D Plan  a. Patient had a Medicare Cost Plan for 2018 which is no longer available for 2019.  b. Patient would like assistance with choosing a new plan for 2019.  She isn't sure what her options are.   c. Patient would like to make sure her providers are in network and her medications are on the formulary.    d.   FYI-patient struggles with follow through on things due to poor motivation/depression symptoms.        Are you working with any other Social workers or providers on this issue? No

## 2021-06-21 NOTE — PROGRESS NOTES
Care Guide called the patient for Clinic Care Coordination outreach follow up on goals and action steps.    Completing the Cologuard Test:  Patient states she has not done this yet  She states she has the kit  Keeps forgetting to do it  Encouraged her to place the kit in her bathroom once we get off the phone  This way she is reminded to do it  Patient agreed with this plan    Feeling More Optimistic so that She is Motivated to Do Things During the Day:  Patient states she really appreciated the phone call Dr. Murillo personally made to her the other day  The call made her feel like she was important and mattered    Began the Remeron on Tuesday, 10/30/18  She has been making more food than in the past which she feels good about  Food seems to be tasting better  Reminded her to call the clinic if she notices the Remeron is not helping improve her sleep and appetite  Dr. Murillo may be able to increase the dose    Did not receive the iron supplement, Ferrous Gluconate, from the pharmacy  Reviewed her medication list  Ferrous Gluconate 324 mg 1 tablet twice daily was prescribed; however, is listed as an OTC medication  Discussed that the pharmacy may not have filled it because she can purchase it over the counter  Patient stated she will talk with her pharmacy in order to get the right thing  I spelt the name for her and she wrote it down  Encouraged her to contact me if she has any difficulties getting the medication  She acknowledged understanding    Informed her Dr. Murillo told me she would like her to follow up in 1 month  Discussed that since she just started the Remeron on 10/30/18, we could schedule an appointment for the end of November  Patient stated her daughter drives her to appointments and is off work on Thursdays  Assisted in coordinating an appointment for 11/29/18 at 12:00    Following Up with the Dentist to Complete Needed Dental Work:  She has not scheduled the appointment to complete the molds  Encouraged  her to get this on the calendar prior to our next outreach phone call  Patient acknowledged she really needs to get her teeth taken care of  They are beginning to hurt more  Discussed that eating should improve once her dental work have been completed    If she doesn't have it scheduled by our next outreach call in a month, I will plan on encouraging her to do a conference call with me in order to get it scheduled    Learning What Medicare Part D Plan Options are Available to Her:  Patient states she has a Medicare Cost Plan  She needs to pick a new Medicare Part D Plan for 2019  She isn't sure what to choose in order to ensure her providers are in network  Discussed talking with JENNY Ledezma, in regard to this  Patient agreed to schedule an appointment  Scheduled a telephone appointment for 11/8/18 at 2:00 with JENNY Ldeezma  Reminded the patient to be ready for the phone call with her phone on and beside her    RN Assessment:  Patient states she forgot about the RN Assessment telephone appointment that was scheduled for 10/18/18  Assisted in re-scheduling the appointment for 11/8/18 at 10:00  I stressed the importance of being ready for the call with her phone on and by her side at 10:00  Discussed the policy of 3 no shows in a row she will no longer be able to participate with [facility]  She acknowledged understanding    RN Assessment History:  3/2/18--cancelled by patient  10/18/18--NSH1    Plan for Next Outreach:  1.  Discuss getting rescheduled for the Dexa Scan.  No showed x2 and cancelled once.    Pending Appointments:  11/8/18 at 10:00 with Aleshia Moraes CCC RN, via Telephone                    2:00 with JENNY Ledezma, via Telephone  11/29/18 at 12:00 with Dr. Murillo  ___________________    Completed Care Guide Delegation from Aleshia Moraes CCC RN, on 10/18/18:  Due Date: Within 2 weeks from today's date 10/18/18  Delegation: No Show for RN Appointment. Please Follow unsuccessful  outreach, no show standard work.  10/26/18: Patient had a pending appointment to see Dr. Murillo today at 11:40.  I had placed the patient on my schedule as well in order to discuss rescheduling the RN Assessment.  The patient no showed both appointments.  Attempt #1  11/1/18: Rescheduled RN Assessment for 11/8/18 at 10:00 via telephone.  Delegation Completed  ___________________  Next Outreach Date: 12/3/18  Planned Outreach Frequency: monthly  Preferred Phone Number: [phone]    RN Assessment Date: DUE  (scheduled for 11/8/18)  Goal Setting Date: 1/23/15    Chronic Medical Diagnosis/Physical Health Needs:  Osteopenia  Hyperlipidemia  Migraine Headache  Trigeminal Neuralgia  Underweight  Protein-Calorie Malnutrition, Severe    Medications:  Medication Management: Independent  Pharmacy: [facility] [address] in [place]    Preventative Measures:  Medical Insurance: Blue Cross Blue Shield Platinum Blue  ID: [id number]    Medicare Part A and B  ID: [id number]    Annual Physical Exam: 5/10/18  Mammogram: 6/12/18  Dexa Scan: DUE  Colonoscopy: 5/3/06 DUE  (cologuard ordered 10/15/18)  Depression Follow Up: DUE  Fall Risk Assessment: 5/10/18  Advance Directives: On File  Td Shot: 5/10/18  Zostavax Shot: 11/7/07  PCV-13 Shot: 10/29/15  Pneumovax Shot: 10/10/14    Mental Health Diagnosis/Needs:   Anxiety  Moderate, Major Depression    Mental Health Provider:   None    Hygiene:  Independent    Rest/Sleep:  Unknown    Nutrition:  Meals on Wheels once a day  Boost once a day  Has had at least 6 teeth extracted, may have had more  No desire to eat or cook    Substance/CD:  Use of Cigarettes: Quit in 2014  Second Hand Smoke Exposure: None  Alcohol Use: None  Street Drugs: None    Family Planning:  NA    Employment/Education:  Unknown    Housing:  House    Interpersonal:      Household Members:  Self    Safety:  Hoarding tendencies    Daily Activities/Exercise:  Scrapbooking    Social  Network:  Daughter lives close by (recently started a full time job)  2 sisters live in the area    Anglican/Spiritual:  Unknown    Transportation:  Does not drive  Daughter drives her to appointments (only available Thursdays)     Financial/Expenses:  Has a 401K    Understanding of Health and Wellbeing/Barriers:  Unknown    Current Services/Support:  Meals on Wheels once a day

## 2021-06-21 NOTE — PROGRESS NOTES
My Clinic Care Coordination Care Plan    [facility]  [address] [place]  [address]  [phone]    My Preferred Method of Contact:  Phone: [phone]    My Primary/Preferred Language:  English    Preferred Learning Style:  Reading information, Face to face discussion, Pictures/Diagrams and Hands on teaching    Emergency Contact: Extended Emergency Contact Information  Primary Emergency Contact: Alissa Del Real   [facility]  Home Phone: [phone]  Mobile Phone: [phone]  Relation: Child  Secondary Emergency Contact: Cesia Lu   [facility]  Home Phone: [phone]  Mobile Phone: [phone]  Relation: Sibling     PCP:  Mara Murillo MD  Specialists:    Care Team            Mara Murillo MD   [phone] PCP - General, Family Medicine    Keri Soto, [facility] Care Coordination Care Guide, Clinic Care Coordination    [facility]; Phone: [phone]; Fax: [phone]    Raj King MD   [phone] Physician, Neurology    [facility]; Follow Up PRN    [employer] Whe[employer]   [phone]     Alissa Del Real, Daughter   [phone]     Emergency Contact; Consent to Communicate signed 5/10/18          Hospitalizations and/or ED Visits  Most Recent: 3/8/17 at [facility] ED     Reason:  Facial Pain    Concerns regarding my health: Managing the facial pain    Advanced Directive/Living Will: On file with the clinic      Julisa elected to enroll in the [facility] Care Coordination.  Julisa was given a copy of the Clinic Care Coordination brochure and full description of how to access their care team both during clinic hours and after hours.   My Care Guide's Name and Phone Number:  Dora Soto   Phone: [phone]  The Care Guide and myself agreed to be in contact monthly.      Medication Update  The patient's medication list is up to date per Dr. Murillo during the office visit on 10/15/18.    Health Maintenance  "and Immunization Update  The patient's preventive health screenings and immunizations are not up to date and the following actions are recommended and will be followed up by my Care Guide : Dexa Scan, Shingles Vaccine and Colon Cancer Screening.    My Emergency Plan  Problem: Self-Care for Headaches  Patient Emergency Plan:    Most headaches aren't serious and can be relieved with self-care. But some headaches may be a sign of another health problem like eye trouble or high blood pressure. To find the best treatment, learn what kind of headaches you get. For tension headaches, self-care will usually help. To treat migraines, ask your healthcare provider for advice. It is also possible to get both tension and migraine headaches. Self-care involves relieving the pain and avoiding headache  triggers  if you can.     Ways to reduce pain and tension  Try these steps:    Apply a cold compress or ice pack to the pain site.    Drink fluids. If nausea makes it hard to drink, try sucking on ice.    Rest. Protect yourself from bright light and loud noises.    Calm your emotions by imagining a peaceful scene.    Massage tight neck, shoulder, and head muscles.    To relax muscles, soak in a hot bath or use a hot shower.  Use medicines  Aspirin or aspirin substitutes, such as ibuprofen and acetaminophen, can relieve headache. Remember: Never give aspirin to anyone 18 years old or younger because of the risk of developing Reye syndrome. Use pain medicines only when necessary.  Track your headaches  Keeping a headache diary can help you and your healthcare provider identify what's causing your headaches:    Note when each headache happens.    Identify your activities and the foods you've eaten 6 to 8 hours before the headache began.    Look for any trends or \"triggers.\"  Signs of tension headache  Any of the following can be signs:    Dull pain or feeling of pressure in a tight band around your head    Pain in your neck or " "shoulders    Headache without a definite beginning or end    Headache after an activity such as driving or working on a computer  Signs of migraine  Any of the following can be signs:    Throbbing pain on one or both sides of your head    Nausea or vomiting    Extreme sensitivity to light, sound, and smells    Bright spots, flashes, or other visual changes    Pain or nausea so severe that you can't continue your daily activities  Call your healthcare provider    If you have any of the following symptoms, contact your healthcare provider:    A headache that lingers after a recent injury or bump to the head.    A fever with a stiff neck or pain when you bend your head toward your chest.    A headache along with slurred speech, changes in your vision, or numbness or weakness in your arms or legs.    A headache for longer than 3 days.    Frequent headaches, especially in the morning.    Headaches with seizures     Seek immediate medical attention if you have a headache that you would call \"the worst headache you have ever had.\"     Problem: When to Use the Emergency Department (ED)  Patient Emergency Plan:   An emergency means you could die if you don t get care quickly. Or you could be hurt permanently (disabled). Read below to know when to use -- and when not to use -- an emergency department (also called ED).     Dangers to your life  Here are examples of emergencies. These need immediate care:  A hard time breathing  Severe chest pain  Choking  Severe bleeding  Suddenly not able to move or speak  Blacking out (fainting)`  Poisoning     Dangers of permanent injuries  Here are other emergencies. These also need immediate care:  Deep cuts or severe burns  Broken bones, or sudden severe pain and swelling in a joint     When it s an emergency  If you have an emergency, follow these steps:     1. Go to the nearest emergency department  If you can, go to the hospital ED closest to you right away.  If you cannot get there " right away, or if it is not safe to take yourself, call 911 or your police emergency number.  2. Call your primary care doctor  Tell your doctor about the emergency. Call within 24 hours of going to the ED.  If you cannot call, have someone call for you.  Go to your doctor (not the ED) for any follow-up care.     When it s not an emergency  If a problem is not an emergency, follow these steps:     1. Call your primary care doctor  If you don t know the name of your doctor, call your health plan.  If you cannot call, have someone call for you.  2. Follow instructions  Your doctor will tell you what you should do.  You may be told to see your doctor right away. You may be told to go to the ED. Or you may be told to go to an urgent care center.  Follow your doctor s advice.    All [facility] clinic patients have access to a Nurse 24 hours a day, 7 days a week.  If you have questions or want advice from a Nurse, please know [facility] is here for you.  You can call your clinic, [phone], and they will connect you or you can call Care Connection at [phone].  [facility] also has Walk In Care clinics in multiple locations.  Call the number listed above for more information about our Walk In Care clinics or visit the [facility] website at [email or web address].    Patient Support  I will ask my emergency contact for help in supporting me in these goals.  Relationship to patient: Daughter  Cell # : [phone] , best time to call is anytime.

## 2021-06-21 NOTE — PROGRESS NOTES
[facility] SW called pt at scheduled phone visit of 2:00 PM and was able to connect.    SW assisted with calling the Senior Linkage Line (SLL) to connect with a Medicare specialist to discuss plans. The worker will be mailing the SW a copy of the plans as well as the pt for SW to call the pt again to check each plan to see if provider's are in network. The pt will make a decision based on this for which plan she will choose. SW scheduled f/u visit for 11/26.    SW will also discuss depression, pt may benefit from socialization resources including [facility] [employer] [facility] and/or [facility] block nurse program.

## 2021-06-21 NOTE — PROGRESS NOTES
"73F PMH: Trigeminal Neuralgia, migraines, high cholesterol, Lives in a single family home.  Bedroom and bathroom on the main level.  Laundry is in the basement.  Patient does not use a DME item and is able to go down the stairs to complete her laundry.  Patient does not drive.  Her Dtr & grandson assist with taking her grocery shopping, Rx pick-up and to Dr's appointments.  C/O pain everyday from Trigeminal Neuralgia.  States she takes Tylenol #3 for pain.  Still does not have the Ferrous Sulfate in the home.  Had forgotten about conversation from [facility] CG on 11/1 that patient needed to pick this medication up OTC.  Pt stated an understanding. Patient stated she is having depression.  Stated she did not tell her PCP at appt on 10/15 because she was \"embarrassed\".  States 'she does not ever want to do anything, lacks motivation,& loss of appetite.  Denies any weight loss.  Reviewed goals with patient.  Updated and completed goal patient no longer wanted.  Will send note to PCP regarding patients depression per her permission.  Will notify patient of PCP's response.      RN Recommendations and Referrals  Follow up with pcp:  antidepressant medication    Action Plan    RN Will  Will add the patient to [facility] RN tracking list  Be available to the patient as nursing needs arise   Will notify PCP of patients depression.      Care Guide Will  Goals reviewed and updated.  No delegations at this time.    Goals  Goals        Patient Stated      I would like to experience less depression and increased motivation in the next 30-60 days. (pt-stated)            Action steps, integrated from Dr. Murillo on 10/15/18, to achieve this goal:  2.  I will begin the iron supplement, Ferrous Gluconate 324 mg, 1 tablet twice daily.  I did not receive this from the pharmacy.  I will contact my pharmacy and find out if I need to purchase it over the counter.  3.  I will contact Dr. Murillo's office at [phone] and notify her if the 15 mg Remeron is " not helping with the sleep and appetite.  I was reminded of this today.  3.  I will follow up with Dr. Murillo on 11/29/18 at 12:00.    Date goal set:  11/8/18        I would like to follow up with my dentist to complete the needed dental work. (pt-stated)            Action steps to achieve this goal  1.  I saw my dentist in May.  I will schedule a follow up in order to complete the molds needed prior to surgery.  I have not done this yet (11/1/18).  2.  I will get scheduled to see the dental surgeon once my molds have been completed.  This isn't due yet (11/1/18).  3.  I will let my Care Guide know if I need assistance getting the dental appointment for the molds scheduled.  If I don't have the appointment scheduled by the next time my Care Guide calls, we will complete a conference call to get the appointment scheduled.      Date goal set:  2/23/18        I would like to learn what Medicare Part D plan options are available to me. (pt-stated)            Action steps to achieve this goal  1.  I will complete my telephone appointment with Mell Mejia, [facility] SW, on 11/8/18 at 2:00.  2.  I will make sure to inform her I would like to choose a plan that keeps all my providers in network.      Date goal set:  11/1/18            Clinic Care Coordination RN Assessed Needs  Patient Centered Assessment Method-PCAM TOTAL SCORE: 31 (11/8/2018  2:16 PM)  Level 2:  A score of 25-36 indicates that the patient has a moderate initial need for RN or SW intervention at the discretion of the .  The RN will add this patient to her panel and follow closely in partnership with the care guide until stable.  She will reach out to the care guide for support in care coordination needs and graduate the patient to standard care guide outreach when appropriate.      PCAM (Patient Centered Assessment Method)   HEALTH AND WELL-BEING  Other Physical Health Concerns:: trigeminal neuralgia, chronic migraines, ostopenia  RN Assessment:  Physical Health Needs: Mod to severe symptoms or problems that impact on daily life  RN Assessment: Physical Health Problems: Severe imact upon mental well-being and preventing engagment with usual activities  Mental Health Concerns: Depression  RN Assessment:Other Mental Well-Being Concern: Mod to severe problems that interfere with function  RN Assessment: Lifestyle Behaviors: Mod to severe impact on client's well-being, preventing enjoyment of usual activities  SOCIAL ENVIRONMENT  RN Assessment: Home Environment: Safe, stable, but with some inconsistency  RN Assessment: Daily Activites: Adequate participation with social networks  RN Assessment: Social Network: Adequate participation with social networks  RN Assessment: Financial Resources: Financially insecure, some resource challenges  HEALTH LITERACY AND COMMUNICATION  RN Assessment: Health Literacy: Reasonable to good understanding but do not feel able to engage with advice at this time  RN Assessment: Engagement: Adequate communication, with or without minor barriers  SERVICE COORDINATION  Other Services: Other care/services in place with some coordination barriers  Coordination of Services: Required care/services in place and adequately coordinated  PCAM TOTAL SCORE: 31      Emergency Plan  Self-Care for Headaches   Most headaches aren't serious and can be relieved with self-care. But some headaches may be a sign of another health problem like eye trouble or high blood pressure. To find the best treatment, learn what kind of headaches you get. For tension headaches, self-care will usually help. To treat migraines, ask your healthcare provider for advice. It is also possible to get both tension and migraine headaches. Self-care involves relieving the pain and avoiding headache  triggers  if you can.    Ways to reduce pain and tension  Try these steps:    Apply a cold compress or ice pack to the pain site.    Drink fluids. If nausea makes it hard to drink,  "try sucking on ice.    Rest. Protect yourself from bright light and loud noises.    Calm your emotions by imagining a peaceful scene.    Massage tight neck, shoulder, and head muscles.    To relax muscles, soak in a hot bath or use a hot shower.  Use medicines  Aspirin or aspirin substitutes, such as ibuprofen and acetaminophen, can relieve headache. Remember: Never give aspirin to anyone 18 years old or younger because of the risk of developing Reye syndrome. Use pain medicines only when necessary.  Track your headaches  Keeping a headache diary can help you and your healthcare provider identify what's causing your headaches:    Note when each headache happens.    Identify your activities and the foods you've eaten 6 to 8 hours before the headache began.    Look for any trends or \"triggers.\"  Signs of tension headache  Any of the following can be signs:    Dull pain or feeling of pressure in a tight band around your head    Pain in your neck or shoulders    Headache without a definite beginning or end    Headache after an activity such as driving or working on a computer  Signs of migraine  Any of the following can be signs:    Throbbing pain on one or both sides of your head    Nausea or vomiting    Extreme sensitivity to light, sound, and smells    Bright spots, flashes, or other visual changes    Pain or nausea so severe that you can't continue your daily activities  Call your healthcare provider    If you have any of the following symptoms, contact your healthcare provider:    A headache that lingers after a recent injury or bump to the head.    A fever with a stiff neck or pain when you bend your head toward your chest.    A headache along with slurred speech, changes in your vision, or numbness or weakness in your arms or legs.    A headache for longer than 3 days.    Frequent headaches, especially in the morning.    Headaches with seizures     Seek immediate medical attention if you have a headache that you " "would call \"the worst headache you have ever had.\"         Emergency Plan Recommendation:    When to Use the Emergency Department (ED)  An emergency means you could die if you don t get care quickly. Or you could be hurt permanently (disabled). Read below to know when to use -- and when not to use -- an emergency department (also called ED).    Dangers to your life  Here are examples of emergencies. These need immediate care:  A hard time breathing  Severe chest pain  Choking  Severe bleeding  Suddenly not able to move or speak  Blacking out (fainting)`  Poisoning    Dangers of permanent injuries  Here are other emergencies. These also need immediate care:  Deep cuts or severe burns  Broken bones, or sudden severe pain and swelling in a joint    When it s an emergency  If you have an emergency, follow these steps:    1. Go to the nearest emergency department  If you can, go to the hospital ED closest to you right away.  If you cannot get there right away, or if it is not safe to take yourself, call 911 or your police emergency number.  2. Call your primary care doctor  Tell your doctor about the emergency. Call within 24 hours of going to the ED.  If you cannot call, have someone call for you.  Go to your doctor (not the ED) for any follow-up care.    When it s not an emergency  If a problem is not an emergency, follow these steps:    1. Call your primary care doctor  If you don t know the name of your doctor, call your health plan.  If you cannot call, have someone call for you.  2. Follow instructions  Your doctor will tell you what you should do.  You may be told to see your doctor right away. You may be told to go to the ED. Or you may be told to go to an urgent care center.  Follow your doctor s advice.        "

## 2021-06-21 NOTE — PROGRESS NOTES
Assessment/plan   Julisa Chaney is a 73 y.o. female who is  establish patient to my practice here with   Chief Complaint   Patient presents with     Med Management     refills         Julisa was seen today for med management.    Diagnoses and all orders for this visit:    Trigeminal neuralgia    Protein-calorie malnutrition, severe (H)  Comments:  patient does have meal on wheal once per day , but no desire to eat or cook.    Chronic obstructive pulmonary disease, unspecified COPD type (H)    Osteopenia  -     DXA Bone Density Scan; Future    Migraine    Controlled substance agreement signed    Trigeminal nerve disorder  Comments:  chronic pain left side of the face , which  affecting her appetite , continue topamax one tab daily   Orders:  -     acetaminophen-codeine (TYLENOL #3) 300-30 mg per tablet; Take 1 tablet by mouth every 6 (six) hours as needed for pain.    Moderate major depression (H)  Comments:  lives alone and recently her daughter started full time Job so no seeing her anymore , does have 3 grandkids , no desire to do any things     Screen for colon cancer  -     Ambulatory referral for Colonoscopy    Anemia  -     ferrous gluconate (FERGON) 324 MG tablet; Take 1 tablet (324 mg total) by mouth 2 (two) times a day.    Other orders  -     mirtazapine (REMERON) 15 MG tablet; Take 1 tablet (15 mg total) by mouth at bedtime.  -     Influenza High Dose, Seasonal 65+ yrs      Talked with our care coordinator also to help reach some  goals please see seperated notes from them  Plan also  have her see long care planning nurse also   Subjective:      HPI: Julisa Chaney is a 73 y.o. female is here for.    Trigeminal Neuralgia : stable on current treatment plan , taking topamax on once 50 mg daily . Also adv on cut down the dose of tylenol #3    Underweight : added vit D and MVI she had recent ECHO which was normal and chest CT which showed mild emphysema and lung mass which resolve on Follow up most likely  lobar pneumonia   Does not need inhaler any more     Agree to do dexa and cologuard   Depression: Patient complains of depression. She complains as in PHQ. Onset was approximately several months ago, gradually worsening since that time.  She denies current suicidal and homicidal plan or intent.   Family history significant for no psychiatric illness.Possible organic causes contributing are: endocrine/metabolic, nutritional.  Risk factors: previous episode of depression Previous treatment includes none      Little interest or pleasure in doing things: Several days  Feeling down, depressed, or hopeless: Several days  Trouble falling or staying asleep, or sleeping too much: Several days  Feeling tired or having little energy: Several days  Poor appetite or overeating: More than half the days  Feeling bad about yourself - or that you are a failure or have let yourself or your family down: Several days  Trouble concentrating on things, such as reading the newspaper or watching television: Several days  Moving or speaking so slowly that other people could have noticed. Or the opposite - being so fidgety or restless that you have been moving around a lot more than usual: Several days  Thoughts that you would be better off dead, or of hurting yourself in some way: Not at all  PHQ-9 Total Score: 9  If you checked off any problems, how difficult have these problems made it for you to do your work, take care of things at home, or get along with other people?: Somewhat difficult     YUE-7 Screening Results:  How difficult did these problems make it for you to do your work, take care of things at home or get along with other people? : Somewhat difficult (10/15/2018 12:00 PM)  Feeling nervous, anxious, or on edge: 1 (10/15/2018 12:00 PM)  Not being able to stop or control worryin (10/15/2018 12:00 PM)  Worrying too much about different things: 1 (10/15/2018 12:00 PM)  Trouble relaxin (10/15/2018 12:00 PM)  Being so  restless that it's hard to sit still: 0 (10/15/2018 12:00 PM)  Becoming easily annoyed or irritable: 0 (10/15/2018 12:00 PM)  Feeling afraid as if something awful might happen: 1 (10/15/2018 12:00 PM)  YUE 7 Total Score: 4 (10/15/2018 12:00 PM)  How difficult did these problems make it for you to do your work, take care of things at home or get along with other people? : Somewhat difficult (10/15/2018 12:00 PM)    Past Medical History:   Diagnosis Date     High cholesterol      Migraines      Trigeminal neuralgia      Past Surgical History:   Procedure Laterality Date     AL TOTAL ABDOM HYSTERECTOMY      Description: Total Abdominal Hysterectomy;  Recorded: 08/17/2010;     Penicillins  Current Outpatient Prescriptions   Medication Sig Dispense Refill     acetaminophen-codeine (TYLENOL #3) 300-30 mg per tablet Take 1 tablet by mouth every 6 (six) hours as needed for pain. 60 tablet 0     cholecalciferol, vitamin D3, (VITAMIN D3) 2,000 unit Tab One tab daily 90 tablet 3     multivitamin (ONE A DAY) per tablet Take 1 tablet by mouth daily. 120 tablet 2     nortriptyline (PAMELOR) 25 MG capsule TAKE 4 CAPSULES (100 MG    TOTAL) AT BEDTIME 360 capsule 2     OXcarbazepine (TRILEPTAL) 150 MG tablet 3 tab at bedtime 270 tablet 0     topiramate (TOPAMAX) 50 MG tablet TAKE 1 AND 1/2 TABLETS BY MOUTH TWICE DAILY 270 tablet 0     ferrous gluconate (FERGON) 324 MG tablet Take 1 tablet (324 mg total) by mouth 2 (two) times a day. 180 tablet 3     mirtazapine (REMERON) 15 MG tablet Take 1 tablet (15 mg total) by mouth at bedtime. 30 tablet 2     topiramate (TOPAMAX) 50 MG tablet Take 1 tablet (50 mg total) by mouth 2 (two) times a day. 180 tablet 4     No current facility-administered medications for this visit.      Family History   Problem Relation Age of Onset     Cancer Father      Testicular cancer Grandchild 17     Breast cancer Mother      Breast cancer Sister      Breast cancer Maternal Aunt      Breast cancer Sister         Patient Active Problem List   Diagnosis     Osteopenia     Hyperlipidemia     Migraine Headache     Trigeminal neuralgia     Underweight     Protein-calorie malnutrition, severe (H)     Counseling regarding advanced directives and goals of care     Controlled substance agreement signed       Review of Systems   12 point comprehensive review of systems was negative except as noted and HPI     Social History     Social History Narrative    Lives alone in the house, relay in TV dinner    grandkids help with house maintenance    Daughter lives close by.    Mara Murillo MD 7/9/2018 1:49 PM         Objective:     Vitals:    10/15/18 1123 10/15/18 1155   BP: 143/74 118/60   Pulse: 72    Weight: 101 lb (45.8 kg)        Physical Exam:     General: Alert, no acute distress.   HEENT: normocephalic conjunctivae are clear, Normal pearly TMs bilaterally without erythema, pus or fluid. Position and landmarks are normal.  Nose is clear.  Oropharynx is moist and clear, without tonsillar hypertrophy, asymmetry, exudate or lesions.  Neck: supple without adenopathy or thyromegaly.  Lungs: Good aeration bilaterally. No prolongation of expiratory phase.   No tachypnea, retractions, or increased work of breathing. Clear to auscultation without wheezes, rales or rhonci.    Heart: regular rate and rhythm, normal S1 and S2, no murmurs  Abdomen: soft and nontender, bowel sounds are present, no hepatosplenomegaly or mass palpable.  Skin: clear without rash or lesions  Neuro: alert, interactive moving all extremities equally, normal muscle tone in all 4 extremities, deep tendon reflexes 2+ symmetrically at the patella    I spent 40 minutes with the patient, >50% of which was in counseling regarding the patient's medical issues as noted above.    Mara Murillo MD    Patient Instructions   Dear Julisa    It was a pleasure to see you in clinic today. Should you have any questions or concerns, my assistant is Eloise / and care coordinator  [place] and they  can be reached directly at [phone]    Plan discussed at this visit : as you feeling no desire to eat or do anything I will start you on remeron 15 mg at bedtime help you sleep and appetite and might cut down the need for pain med , if feel better let me know as I might have to increase the dose   Refill done on pain med   Please start taking iron supplement twice daily    Flu shot done today   Please send back the stool kit ( cologuard) which ment to screen for colon cancer  Make luzma for dental visit   As no need for inhaler or oxygen then no need to see lung doctor     For lab work, our office will contact you with those results in your preferred method of communication either Mychart , letter or call.    Feel free to call for any concerns or questions or send us My chart message     Mara Murillo MD

## 2021-06-22 RX ORDER — ACETAMINOPHEN AND CODEINE PHOSPHATE 300; 30 MG/1; MG/1
TABLET ORAL
Qty: 120 TABLET | Refills: 0 | Status: SHIPPED | OUTPATIENT
Start: 2021-06-22 | End: 2022-02-07

## 2021-06-22 RX ORDER — OMEPRAZOLE 40 MG/1
CAPSULE, DELAYED RELEASE ORAL
Qty: 90 CAPSULE | Refills: 1 | Status: SHIPPED | OUTPATIENT
Start: 2021-06-22 | End: 2022-02-07

## 2021-06-22 NOTE — PROGRESS NOTES
Scheduled Follow Up Call: Attempt 1  Care Guide called and left a message for the patient.  If the patient is returning my call, please transfer her to me, Dora Damonnikita, at [phone].  The patient has a pending appointment with Dr. Murillo tomorrow, 12/4/18, at 11:00.  I informed her in my message that I will plan on trying to meet with her while she is here in clinic.    Plan for Next Outreach:  1.  Discuss getting rescheduled for the Dexa Scan.  No showed x2 and cancelled once.  2.  Review and complete the updated Care Plan, mail a copy to the patient.    Pending Appointments:  12/4/18 at 11:00 with Dr. Murillo  ___________________  Next Outreach Date: 12/4/18  Planned Outreach Frequency: monthly  Preferred Phone Number: [phone]    RN Assessment Date: 11/8/18  Goal Setting Date: 1/23/15  Updated Care Plan Date: TBD    Chronic Medical Diagnosis/Physical Health Needs:  Osteopenia  Hyperlipidemia  Migraine Headache  Trigeminal Neuralgia  Underweight  Protein-Calorie Malnutrition, Severe    Medications:  Medication Management: Independent  Pharmacy: [facility] [address] [facility] [address] in [place]    Preventative Measures:  Medical Insurance: Blue Cross Blue Shield Platinum Blue  ID: [id number]    Medicare Part A and B  ID: [id number]    Annual Physical Exam: 5/10/18  Mammogram: 6/12/18  Dexa Scan: DUE  Colonoscopy: 5/3/06 DUE  (cologuard ordered 10/15/18)  Depression Follow Up: DUE  Fall Risk Assessment: 5/10/18  Advance Directives: On File  Td Shot: 5/10/18  Zostavax Shot: 11/7/07  PCV-13 Shot: 10/29/15  Pneumovax Shot: 10/10/14    Mental Health Diagnosis/Needs:   Anxiety  Moderate, Major Depression    Mental Health Provider:   None    Hygiene:  Independent    Rest/Sleep:  Unknown    Nutrition:  Meals on Wheels once a day  Boost once a day  Has had at least 6 teeth extracted, may have had more  No desire to eat or cook    Substance/CD:  Use of Cigarettes: Quit in 2014  Second Hand Smoke Exposure:  None  Alcohol Use: None  Street Drugs: None    Family Planning:  NA    Employment/Education:  Retired    Housing:  Single Family Home  Bedroom and Bathroom on main level    Interpersonal:      Household Members:  Self    Safety:  Hoarding tendencies  Laundry in basement    Daily Activities/Exercise:  Scrapbooking    Social Network:  Daughter lives close by (recently started a full time job)  2 sisters live in the area  Grandchildren    Mormon/Spiritual:  Unknown    Transportation:  Does not drive  Daughter and Grandson drive her to appointments, shopping, Rx pickup     Financial/Expenses:  Has a 401K    Understanding of Health and Wellbeing/Barriers:  Health Literacy: Reasonable to good understanding but do not feel able to engage with advice at this time    Current Services/Support:  Meals on Wheels once a day

## 2021-06-23 NOTE — TELEPHONE ENCOUNTER
"  Daughter calling re safety of her mom at home  She is \"super weak and dehydrated\"      She was reportedly contacted by [employer] Whe[employer] today who went to see her and found her on the floor.  The patient did not know how she found herself on the floor (fall, faint or other).       Caller went to see mother earlier as well aftehr this and found her \"incoherant\" - oriented to person and time but is worried about her safety.   Does not believe that Rx drugs involved.  She did not see any obvious injuries.       Daughter believes that she is potentially malnourshed or dehydrated which may be contributing to this        Triaged to ED for eval - Should be seen in ED for eval  - they will take her           Reason for Disposition    Very strange or paranoid behavior    Protocols used: CONFUSION - DELIRIUM-A-AH      "

## 2021-06-23 NOTE — TELEPHONE ENCOUNTER
[facility] Nurse Triage Telephone Note      Provider: DOMINIC Manriquez  Facility: [facility]    Facility Type: TCU    Caller: Nivia  Call Back Number:  [phone]    Allergies: Mirtazapine and Penicillins    Reason for call: Has conjunctivitis in L eye & Polymyxin-B drops three times a day, but think its spreading to R eye as is red & scratchy feeling & crusty drainage.     Verbal Order/Direction given by Provider: Use the Polymyxin-B both eyes & I will check them over tomorrow.    Provider giving order: DOMINIC Manriquez    Verbal order given to: Nivia Michaels RN

## 2021-06-23 NOTE — TELEPHONE ENCOUNTER
Controlled Substance Refill Request  Medication:   Requested Prescriptions     Pending Prescriptions Disp Refills     acetaminophen-codeine (TYLENOL #3) 300-30 mg per tablet [Pharmacy Med Name: ACETAMINOPHEN/COD #3 (300/30MG) TAB] 120 tablet 0     Sig: TAKE 1 TABLET BY MOUTH THREE TIMES DAILY AS NEEDED FOR PAIN     Date Last Fill: 12/24/18  Pharmacy: [id number]   Submit electronically to pharmacy  Controlled Substance Agreement on File:   Encounter-Level CSA Scan Date - 09/08/2016:    Scan on 9/9/2016 11:19 AM (below)         Last office visit: Last office visit pertaining to requested medication was 12/4/18.

## 2021-06-23 NOTE — PROGRESS NOTES
Care Guide called the patient in clinic for Clinic Care Coordination outreach follow up on goals and action steps.    Experiencing Less Depression and Increased Motivation in the Next 30-60 Days:  Reviewed Dr. Murillo's recommendations from 12/4/18:  1.  Stop Remeron.    Patient reports she did stop the Remeron.  2.  Start Sertraline (start with 1/2 tab for the first week, then 1 tab daily).  Patient reports she began the Sertraline and is taking 25 mg, 1 tablet daily.  3.  Follow up in 3 months, around 3/4/19.  Informed the patient she has not scheduled this yet.  The patient stated her daughter is only available to bring her to appointments on Tuesdays and Thursdays.  I assisted in coordinating a depression follow up appointment with Dr. Murillo on 3/5/19 at 11:00.    The patient reports:    She is still sleeping too much.    The depression is a little bit better.    Follow Up with the Dentist to Complete the Needed Dental Work:  Has not followed up on this yet.    New Medicare Part D Plan (Supplemental Insurance):  Has her new plan  It is through [employer]   $10.00 co-pays for OV  Has a $350.00 deductible for prescriptions    Outreach Follow Up:  Informed the patient I will follow up with her in 6 weeks  The patient was in agreement with this.    Plan for Next Outreach:  1.  Discuss getting rescheduled for the Dexa Scan.  No showed x2 and cancelled once.    Pending Appointments:  3/5/19 at 11:40 with Dr. Murillo  ___________________  Next Outreach Date: 2/25/19  Planned Outreach Frequency: every 6 weeks  Preferred Phone Number: [phone]    RN Assessment Date: 11/8/18  Goal Setting Date: 1/23/15  Updated Care Plan Date: 11/26/18    Chronic Medical Diagnosis/Physical Health Needs:  Osteopenia  Hyperlipidemia  Migraine Headache  Trigeminal Neuralgia  Underweight  Protein-Calorie Malnutrition, Severe    Medications:  Medication Management: Independent  Pharmacy: [facility] off of [address] in [facility]  [place]    Preventative Measures:  Medical Insurance: Blue Cross Blue Shield Platinum Blue  ID: [id number]  (will be ending 12/31/18)  Medicare Part A and B  ID: [id number]    Annual Physical Exam: 5/10/18  Mammogram: 6/12/18  Dexa Scan: DUE  Colonoscopy: 5/3/06 DUE  (cologuard ordered 10/15/18)  Depression Follow Up: 12/4/18  Fall Risk Assessment: 5/10/18  Advance Directives: On File  Td Shot: 5/10/18  Zostavax Shot: 11/7/07  PCV-13 Shot: 10/29/15  Pneumovax Shot: 10/10/14    Mental Health Diagnosis/Needs:   Anxiety  Moderate, Major Depression    Mental Health Provider:   None    Hygiene:  Independent    Rest/Sleep:  Unknown    Nutrition:  Meals on Wheels once a day  Boost once a day  Has had at least 6 teeth extracted, may have had more  No desire to eat or cook    Substance/CD:  Use of Cigarettes: Quit in 2014  Second Hand Smoke Exposure: None  Alcohol Use: None  Street Drugs: None    Family Planning:  NA    Employment/Education:  Retired    Housing:  Single Family Home  Bedroom and Bathroom on main level    Interpersonal:      Household Members:  Self    Safety:  Hoarding tendencies  Laundry in basement    Daily Activities/Exercise:  Scrapbooking    Social Network:  Daughter lives close by (recently started a full time job)  2 sisters live in the area  Grandchildren    Restorationism/Spiritual:  Unknown    Transportation:  Does not drive  Daughter and Grandson drive her to appointments, shopping, Rx pickup     Financial/Expenses:  Has a 401K    Understanding of Health and Wellbeing/Barriers:  Health Literacy: Reasonable to good understanding but do not feel able to engage with advice at this time    Current Services/Support:  Meals on Wheels once a day

## 2021-06-23 NOTE — PROGRESS NOTES
Care Guide received notification that the patient was discharged from [facility].  I have reviewed the patient's chart    Hospital Admit Date: 2/6/19  Hospital Discharge Date: 2/9/19    Reason for Hospitalization: Not eating or drinking  Discharge Diagnoses: Hypercalcemia    Patient was transferred to [facility] TCU    Care Guide will check the patient's chart in a few weeks to see if she has been discharged from TCU.    Plan for Next Outreach:  1.  Discuss getting rescheduled for the Dexa Scan.  No showed x2 and cancelled once.    Pending Appointments:  3/5/19 at 11:40 with Dr. Murillo  ___________________  Next Outreach Date: 2/25/19  Planned Outreach Frequency: every 6 weeks  Preferred Phone Number: [phone]    RN Assessment Date: 11/8/18  Goal Setting Date: 1/23/15  Updated Care Plan Date: 11/26/18    Chronic Medical Diagnosis/Physical Health Needs:  Osteopenia  Hyperlipidemia  Migraine Headache  Trigeminal Neuralgia  Underweight  Protein-Calorie Malnutrition, Severe    Medications:  Medication Management: Independent  Pharmacy: [facility] off of [address] in [place]    Preventative Measures:  Medical Insurance: Blue Cross Blue Shield Platinum Blue  ID: [id number]  (will be ending 12/31/18)  Medicare Part A and B  ID: [id number]    Annual Physical Exam: 5/10/18  Mammogram: 6/12/18  Dexa Scan: DUE  Colonoscopy: 5/3/06 DUE  (cologuard ordered 10/15/18)  Depression Follow Up: 12/4/18  Fall Risk Assessment: 5/10/18  Advance Directives: On File  Td Shot: 5/10/18  Zostavax Shot: 11/7/07  PCV-13 Shot: 10/29/15  Pneumovax Shot: 10/10/14    Mental Health Diagnosis/Needs:   Anxiety  Moderate, Major Depression    Mental Health Provider:   None    Hygiene:  Independent    Rest/Sleep:  Unknown    Nutrition:  Meals on Wheels once a day  Boost once a day  Has had at least 6 teeth extracted, may have had more  No desire to eat or cook    Substance/CD:  Use of Cigarettes: Quit in  2014  Second Hand Smoke Exposure: None  Alcohol Use: None  Street Drugs: None    Family Planning:  NA    Employment/Education:  Retired    Housing:  Single Family Home  Bedroom and Bathroom on main level    Interpersonal:      Household Members:  Self    Safety:  Hoarding tendencies  Laundry in basement    Daily Activities/Exercise:  Scrapbooking    Social Network:  Daughter lives close by (recently started a full time job)  2 sisters live in the area  Grandchildren    Islam/Spiritual:  Unknown    Transportation:  Does not drive  Daughter and Grandson drive her to appointments, shopping, Rx pickup     Financial/Expenses:  Has a 401K    Understanding of Health and Wellbeing/Barriers:  Health Literacy: Reasonable to good understanding but do not feel able to engage with advice at this time    Current Services/Support:  Meals on Wheels once a day

## 2021-06-24 NOTE — PROGRESS NOTES
Scheduled Follow Up Call: Attempt 1  Care Guide attempted to meet with the patient while she was in-clinic today.  I had placed her on my schedule and she was arrived.  The patient left after seeing Dr. Murillo and I did not get to meet with her.      If the patient is returning my call, please transfer her to me, Dora Brittany, at [phone].    Patient was in TCU at [facility] from 2/9/19-2/28/19.    Plan for Next Outreach:  1.  Discuss getting rescheduled for the Dexa Scan.  No showed x2 and cancelled once.    Pending Appointments:  None  ________________________  Next Outreach Date: 3/14/19  Planned Outreach Frequency: every 6 weeks  Preferred Phone Number: [phone]    RN Assessment Date: 11/8/18  Goal Setting Date: 1/23/15  Updated Care Plan Date: 11/26/18    Chronic Medical Diagnosis/Physical Health Needs:  Osteopenia  Hyperlipidemia  Migraine Headache  Trigeminal Neuralgia  Underweight  Protein-Calorie Malnutrition, Severe    Medications:  Medication Management: Independent  Pharmacy: [facility] off of [address] in [place]    Preventative Measures:  Medical Insurance: Blue Cross Blue Shield Platinum Blue  ID: [id number]  (will be ending 12/31/18)  Medicare Part A and B  ID: [id number]    Annual Physical Exam: 5/10/18  Mammogram: 6/12/18  Dexa Scan: DUE  Colonoscopy: 5/3/06 DUE  (cologuard ordered 10/15/18)  Depression Follow Up: 12/4/18  Fall Risk Assessment: 5/10/18  Advance Directives: On File  Td Shot: 5/10/18  Zostavax Shot: 11/7/07  PCV-13 Shot: 10/29/15  Pneumovax Shot: 10/10/14    Mental Health Diagnosis/Needs:   Anxiety  Moderate, Major Depression    Mental Health Provider:   None    Hygiene:  Independent    Rest/Sleep:  Unknown    Nutrition:  Meals on Wheels once a day  Boost once a day  Has had at least 6 teeth extracted, may have had more  No desire to eat or cook    Substance/CD:  Use of Cigarettes: Quit in 2014  Second Hand Smoke Exposure: None  Alcohol Use:  None  Street Drugs: None    Family Planning:  NA    Employment/Education:  Retired    Housing:  Single Family Home  Bedroom and Bathroom on main level    Interpersonal:      Household Members:  Self    Safety:  Hoarding tendencies  Laundry in basement    Daily Activities/Exercise:  Scrapbooking    Social Network:  Daughter lives close by (recently started a full time job)  2 sisters live in the area  Grandchildren    Zoroastrian/Spiritual:  Unknown    Transportation:  Does not drive  Daughter and Grandson drive her to appointments, shopping, Rx pickup     Financial/Expenses:  Has a 401K    Understanding of Health and Wellbeing/Barriers:  Health Literacy: Reasonable to good understanding but do not feel able to engage with advice at this time    Current Services/Support:  Meals on Wheels once a day

## 2021-06-24 NOTE — TELEPHONE ENCOUNTER
Pt seen in clinic today and forgot her records. Attempted to call pt but telephone # would not go through on several attempts. Placed her records in the mail in case pt calls looking for them.

## 2021-06-24 NOTE — TELEPHONE ENCOUNTER
[facility] Nurse Triage Telephone Note      Provider: Ed Jones MD  Facility: [facility]    Facility Type: TCU    Caller: Yue  Call Back Number:  [phone]    Allergies: Mirtazapine and Penicillins    Reason for call: Nurse calling to report Albumin, Pre-Albumin, and ferritin levels.  Patient is currently on ferrous sulfate 325mg daily and a med pass supplement.       Verbal Order/Direction given by Provider: Increase ferrous sulfate to 325mg two times a day.      Provider giving order: Ed Jones MD    Verbal order given to: Yue Pablo RN

## 2021-06-24 NOTE — PROGRESS NOTES
Code Status:  FULL CODE  Visit Type: Problem Visit     Facility:  [facility] [[id number]]        Facility Type: SNF (Skilled Nursing Facility, TCU)    History of Present Illness: Julisa Chaney is a 73 y.o. female seen for problem visit per nursing request.  She had a recent hospitalization at [facility] 2/6 to 2/9/19.  She has a past medical history for trigeminal neuralgia and depression.  She had been having ongoing increased fatigue, low motivation, depression and low appetite for the past couple of months.  Prior to his hospitalization she was more sedated and encephalopathic and so was taken to the emergency room and found to be hypercalcemic.  PTH was normal, TSH was normal, vitamin D was low, B12 was normal, and a head CT showed no acute abnormalities.  It was presumed that her hypercalcemia was directly related to excessive intake of calcium Via Tums.  She did report that she does take a lot for indigestion.  She does have issues with chronic anemia and at discharge her hemoglobin was 9 and there was no evidence of bleeding.  During hospitalization she did present with bacterial conjunctivitis and was started on Polytrim drops to her left eye.    Nursing reports patient has not been sleeping well the last couple of days due to per patient ongoing pain.  Upon exam patient states that her pain is low upper back to epigastric region.  She states this is why I took Tums.  She is requesting Flexeril in order to have her relax at night.  She was taken off nortriptyline which was used for her trigeminal neuralgia, in the hospital due to encephalopathy.  She denies any other pain other than epigastric and trigeminal neuralgia.  Calcium drawn this week was 9.4 within normal limits, hemoglobin also drawn this week was 9.9 and stable.    Review of Systems   Patient denies fever, chills, headache, lightheadedness, dizziness, rhinorrhea, cough, congestion, shortness of breath, chest pain,  palpitations, abdominal pain, n/v, diarrhea,  change in appetite, dysuria, frequency, burning or pain with urination.  Other than stated in HPI all other review of systems is negative.         Physical Exam   Vital signs: /67, heart rate 108, respiratory 18, temp 97.0.  GENERAL APPEARANCE: Thin, cachectic frail elderly female in no acute distress.  HEENT: normocephalic, atraumatic  PERRL, sclerae anicteric, conjunctivae clear, inner eyelids is pale EOM intact  Wears dentures.  NECK: Supple and symmetric. Trachea is midline, no thyromegaly, no adenopathy, and no tenderness  LUNGS: Lung sounds CTA, no adventitious sounds, respiratory effort normal.  CARD: RRR, S1, S2, without murmurs, gallops, rubs, no JVD,  ABD: Soft and nontender with normal bowel sounds.   MSK: Muscle strength and tone were normal.  EXTREMITIES: No cyanosis, clubbing or edema.  NEURO: Alert and oriented x 3. Normal affect.Face is symmetric.  SKIN: Inspection of the skin reveals no rashes, ulcerations or petechiae.  PSYCH: euthymic          Labs:    Recent Results (from the past 240 hour(s))   Basic Metabolic Panel   Result Value Ref Range    Sodium 144 136 - 145 mmol/L    Potassium 3.4 (L) 3.5 - 5.0 mmol/L    Chloride 99 98 - 107 mmol/L    CO2 32 (H) 22 - 31 mmol/L    Anion Gap, Calculation 13 5 - 18 mmol/L    Glucose 119 70 - 125 mg/dL    Calcium 14.1 (HH) 8.5 - 10.5 mg/dL    BUN 40 (H) 8 - 28 mg/dL    Creatinine 0.95 0.60 - 1.10 mg/dL    GFR MDRD Af Amer >60 >60 mL/min/1.73m2    GFR MDRD Non Af Amer 58 (L) >60 mL/min/1.73m2   Troponin I   Result Value Ref Range    Troponin I 0.05 0.00 - 0.29 ng/mL   HEM 2 Without Diff   Result Value Ref Range    WBC 11.6 (H) 4.0 - 11.0 thou/uL    RBC 5.41 (H) 3.80 - 5.40 mill/uL    Hemoglobin 11.7 (L) 12.0 - 16.0 g/dL    Hematocrit 41.8 35.0 - 47.0 %    MCV 77 (L) 80 - 100 fL    MCH 21.6 (L) 27.0 - 34.0 pg    MCHC 28.0 (L) 32.0 - 36.0 g/dL    RDW 17.8 (H) 11.0 - 14.5 %    Platelets 463 (H) 140 - 440 thou/uL     MPV 10.9 8.5 - 12.5 fL   Protime-INR   Result Value Ref Range    INR 0.97 0.90 - 1.10   APTT   Result Value Ref Range    PTT 24 24 - 37 seconds   Ammonia   Result Value Ref Range    Ammonia 22 11 - 35 umol/L   Hepatic Profile   Result Value Ref Range    Bilirubin, Total 0.2 0.0 - 1.0 mg/dL    Bilirubin, Direct 0.1 <=0.5 mg/dL    Protein, Total 7.3 6.0 - 8.0 g/dL    Albumin 4.0 3.5 - 5.0 g/dL    Alkaline Phosphatase 83 45 - 120 U/L    AST 16 0 - 40 U/L    ALT 11 0 - 45 U/L   Magnesium   Result Value Ref Range    Magnesium 2.0 1.8 - 2.6 mg/dL   Thyroid Stimulating Hormone (TSH)   Result Value Ref Range    TSH 0.80 0.30 - 5.00 uIU/mL   POCT Glucose   Result Value Ref Range    Glucose 112 70 - 139 mg/dL   ECG 12 lead   Result Value Ref Range    SYSTOLIC BLOOD PRESSURE  mmHg    DIASTOLIC BLOOD PRESSURE  mmHg    VENTRICULAR RATE 106 BPM    ATRIAL RATE 106 BPM    P-R INTERVAL 150 ms    QRS DURATION 84 ms    Q-T INTERVAL 316 ms    QTC CALCULATION (BEZET) 419 ms    P Axis 72 degrees    R AXIS 50 degrees    T AXIS -21 degrees    MUSE DIAGNOSIS       Sinus tachycardia  Minimal voltage criteria for LVH, may be normal variant  Septal infarct , age undetermined  Abnormal ECG  When compared with ECG of 03-OCT-2014 11:51,  Nonspecific T wave abnormality now evident in Inferior leads    Confirmed by CHAIM SCOTT MD LOC:[facility] ([id number]) on 2/7/2019 3:56:45 PM     Urinalysis-UC if Indicated   Result Value Ref Range    Color, UA Yellow Colorless, Yellow, Straw, Light Yellow    Clarity, UA Clear Clear    Glucose, UA Negative Negative    Bilirubin, UA Negative Negative    Ketones, UA Trace (!) Negative, 60 mg/dL    Specific Gravity, UA 1.014 1.001 - 1.030    Blood, UA Negative Negative    pH, UA 5.5 4.5 - 8.0    Protein, UA Negative Negative mg/dL    Urobilinogen, UA <2.0 E.U./dL <2.0 E.U./dL, 2.0 E.U./dL    Nitrite, UA Negative Negative    Leukocytes, UA Large (!) Negative    Bacteria, UA Few (!) None Seen hpf    RBC, UA 0-2 None  Seen, 0-2 hpf    WBC, UA 10-25 (!) None Seen, 0-5 hpf    Squam Epithel, UA 10-25 (!) None Seen, 0-5 lpf    Mucus, UA Few (!) None Seen lpf    Hyaline Casts, UA 10-25 (!) 0-5, None Seen lpf   Culture, Urine   Result Value Ref Range    Culture Mixture of urogenital organisms    Calcium, Ionized, Measured   Result Value Ref Range    Calcium, Ionized Measured 1.54 (H) 1.11 - 1.30 mmol/L    Calcium, Ionized pH 7.4 1.55 (H) 1.11 - 1.30 mmol/L    pH 7.41 7.35 - 7.45   BMP   Result Value Ref Range    Sodium 146 (H) 136 - 145 mmol/L    Potassium 3.1 (L) 3.5 - 5.0 mmol/L    Chloride 110 (H) 98 - 107 mmol/L    CO2 27 22 - 31 mmol/L    Anion Gap, Calculation 9 5 - 18 mmol/L    Glucose 71 70 - 125 mg/dL    Calcium 10.7 (H) 8.5 - 10.5 mg/dL    BUN 29 (H) 8 - 28 mg/dL    Creatinine 0.72 0.60 - 1.10 mg/dL    GFR MDRD Af Amer >60 >60 mL/min/1.73m2    GFR MDRD Non Af Amer >60 >60 mL/min/1.73m2   PTH   Result Value Ref Range    PTH 18 10 - 86 pg/mL   Albumin - Moderate Hypercalcemia (Corrected Calcium 12.1-14 mg/dL)   Result Value Ref Range    Albumin 2.8 (L) 3.5 - 5.0 g/dL   Magnesium   Result Value Ref Range    Magnesium 1.6 (L) 1.8 - 2.6 mg/dL   Vitamin D, Total (25-Hydroxy)   Result Value Ref Range    Vitamin D, Total (25-Hydroxy) 14.1 (L) 30.0 - 80.0 ng/mL   Calcium - Moderate Hypercalcemia (Corrected Calcium 12.1-14 mg/dL)   Result Value Ref Range    Calcium 9.8 8.5 - 10.5 mg/dL   Albumin - Moderate Hypercalcemia (Corrected Calcium 12.1-14 mg/dL)   Result Value Ref Range    Albumin 2.7 (L) 3.5 - 5.0 g/dL   Potassium   Result Value Ref Range    Potassium 3.1 (L) 3.5 - 5.0 mmol/L   Calcium, Ionized, Measured   Result Value Ref Range    Calcium, Ionized Measured 1.28 1.11 - 1.30 mmol/L    Calcium, Ionized pH 7.4 1.26 1.11 - 1.30 mmol/L    pH 7.37 7.35 - 7.45   BMP   Result Value Ref Range    Sodium 144 136 - 145 mmol/L    Potassium 2.9 (L) 3.5 - 5.0 mmol/L    Chloride 110 (H) 98 - 107 mmol/L    CO2 28 22 - 31 mmol/L    Anion Gap,  Calculation 6 5 - 18 mmol/L    Glucose 75 70 - 125 mg/dL    Calcium 9.1 8.5 - 10.5 mg/dL    BUN 11 8 - 28 mg/dL    Creatinine 0.65 0.60 - 1.10 mg/dL    GFR MDRD Af Amer >60 >60 mL/min/1.73m2    GFR MDRD Non Af Amer >60 >60 mL/min/1.73m2   Magnesium   Result Value Ref Range    Magnesium 1.7 (L) 1.8 - 2.6 mg/dL   Calcium - Moderate Hypercalcemia (Corrected Calcium 12.1-14 mg/dL)   Result Value Ref Range    Calcium 9.1 8.5 - 10.5 mg/dL   Albumin - Moderate Hypercalcemia (Corrected Calcium 12.1-14 mg/dL)   Result Value Ref Range    Albumin 2.7 (L) 3.5 - 5.0 g/dL   HM1 (CBC with Diff)   Result Value Ref Range    WBC 10.4 4.0 - 11.0 thou/uL    RBC 3.93 3.80 - 5.40 mill/uL    Hemoglobin 8.6 (L) 12.0 - 16.0 g/dL    Hematocrit 30.6 (L) 35.0 - 47.0 %    MCV 78 (L) 80 - 100 fL    MCH 21.9 (L) 27.0 - 34.0 pg    MCHC 28.1 (L) 32.0 - 36.0 g/dL    RDW 17.9 (H) 11.0 - 14.5 %    Platelets 334 140 - 440 thou/uL    MPV 11.0 8.5 - 12.5 fL    Neutrophils % 70 50 - 70 %    Lymphocytes % 23 20 - 40 %    Monocytes % 6 2 - 10 %    Eosinophils % 1 0 - 6 %    Basophils % 0 0 - 2 %    Neutrophils Absolute 7.2 2.0 - 7.7 thou/uL    Lymphocytes Absolute 2.4 0.8 - 4.4 thou/uL    Monocytes Absolute 0.6 0.0 - 0.9 thou/uL    Eosinophils Absolute 0.1 0.0 - 0.4 thou/uL    Basophils Absolute 0.0 0.0 - 0.2 thou/uL   Vitamin B12   Result Value Ref Range    Vitamin B-12 450 213 - 816 pg/mL   Calcium - Moderate Hypercalcemia (Corrected Calcium 12.1-14 mg/dL)   Result Value Ref Range    Calcium 9.4 8.5 - 10.5 mg/dL   Albumin - Moderate Hypercalcemia (Corrected Calcium 12.1-14 mg/dL)   Result Value Ref Range    Albumin 2.7 (L) 3.5 - 5.0 g/dL   Potassium   Result Value Ref Range    Potassium 5.2 (H) 3.5 - 5.0 mmol/L   Calcium, Ionized, Measured   Result Value Ref Range    Calcium, Ionized Measured 1.34 (H) 1.11 - 1.30 mmol/L    Calcium, Ionized pH 7.4 1.33 (H) 1.11 - 1.30 mmol/L    pH 7.39 7.35 - 7.45   BMP   Result Value Ref Range    Sodium 140 136 - 145  mmol/L    Potassium 4.6 3.5 - 5.0 mmol/L    Chloride 109 (H) 98 - 107 mmol/L    CO2 27 22 - 31 mmol/L    Anion Gap, Calculation 4 (L) 5 - 18 mmol/L    Glucose 86 70 - 125 mg/dL    Calcium 9.4 8.5 - 10.5 mg/dL    BUN 8 8 - 28 mg/dL    Creatinine 0.65 0.60 - 1.10 mg/dL    GFR MDRD Af Amer >60 >60 mL/min/1.73m2    GFR MDRD Non Af Amer >60 >60 mL/min/1.73m2   Albumin - Moderate Hypercalcemia (Corrected Calcium 12.1-14 mg/dL)   Result Value Ref Range    Albumin 2.7 (L) 3.5 - 5.0 g/dL   HM1 (CBC with Diff)   Result Value Ref Range    WBC 10.8 4.0 - 11.0 thou/uL    RBC 4.05 3.80 - 5.40 mill/uL    Hemoglobin 9.0 (L) 12.0 - 16.0 g/dL    Hematocrit 31.5 (L) 35.0 - 47.0 %    MCV 78 (L) 80 - 100 fL    MCH 22.2 (L) 27.0 - 34.0 pg    MCHC 28.6 (L) 32.0 - 36.0 g/dL    RDW 18.4 (H) 11.0 - 14.5 %    Platelets 360 140 - 440 thou/uL    MPV 10.5 8.5 - 12.5 fL    Neutrophils % 73 (H) 50 - 70 %    Lymphocytes % 19 (L) 20 - 40 %    Monocytes % 6 2 - 10 %    Eosinophils % 1 0 - 6 %    Basophils % 1 0 - 2 %    Neutrophils Absolute 7.9 (H) 2.0 - 7.7 thou/uL    Lymphocytes Absolute 2.1 0.8 - 4.4 thou/uL    Monocytes Absolute 0.6 0.0 - 0.9 thou/uL    Eosinophils Absolute 0.1 0.0 - 0.4 thou/uL    Basophils Absolute 0.1 0.0 - 0.2 thou/uL   Basic Metabolic Panel   Result Value Ref Range    Sodium 140 136 - 145 mmol/L    Potassium 3.8 3.5 - 5.0 mmol/L    Chloride 105 98 - 107 mmol/L    CO2 28 22 - 31 mmol/L    Anion Gap, Calculation 7 5 - 18 mmol/L    Glucose 98 70 - 125 mg/dL    Calcium 9.9 8.5 - 10.5 mg/dL    BUN 9 8 - 28 mg/dL    Creatinine 0.67 0.60 - 1.10 mg/dL    GFR MDRD Af Amer >60 >60 mL/min/1.73m2    GFR MDRD Non Af Amer >60 >60 mL/min/1.73m2   Magnesium   Result Value Ref Range    Magnesium 2.2 1.8 - 2.6 mg/dL   Calcium   Result Value Ref Range    Calcium 9.4 8.5 - 10.5 mg/dL   HM1 (CBC with Diff)   Result Value Ref Range    WBC 6.6 4.0 - 11.0 thou/uL    RBC 4.61 3.80 - 5.40 mill/uL    Hemoglobin 9.9 (L) 12.0 - 16.0 g/dL    Hematocrit  36.0 35.0 - 47.0 %    MCV 78 (L) 80 - 100 fL    MCH 21.5 (L) 27.0 - 34.0 pg    MCHC 27.5 (L) 32.0 - 36.0 g/dL    RDW 19.0 (H) 11.0 - 14.5 %    Platelets 595 (H) 140 - 440 thou/uL    MPV 11.0 8.5 - 12.5 fL    Neutrophils % 47 (L) 50 - 70 %    Lymphocytes % 42 (H) 20 - 40 %    Monocytes % 7 2 - 10 %    Eosinophils % 3 0 - 6 %    Basophils % 1 0 - 2 %    Neutrophils Absolute 3.1 2.0 - 7.7 thou/uL    Lymphocytes Absolute 2.8 0.8 - 4.4 thou/uL    Monocytes Absolute 0.5 0.0 - 0.9 thou/uL    Eosinophils Absolute 0.2 0.0 - 0.4 thou/uL    Basophils Absolute 0.1 0.0 - 0.2 thou/uL         Assessment:  1. Reflux gastritis     2. Epigastric pain     3. Hypercalcemia     4. Trigeminal neuralgia         Plan:   Reflux: Start on omeprazole 20 mg could increase to 20 twice daily if she does not have relief in the next few days.  Counseled on because of epigastric pain and that Flexeril is not an appropriate medication for this type of discomfort.  Hypercalcemia: Is resolved continue to monitor.  Trigeminal neuralgia: Restart nortriptyline 50 mg at at bedtime, will change her Tylenol 3 to as needed every 6 hours, will also add hydroxyzine in order to help restlessness and itching at night.  We will need to monitor for somnolence.  Discussion in regards to home medications and what has worked for her in the past.      35 minutes was spent face-to-face with patient discussing current clinical status, medication review with side effects and response, lab review and discussion of plan of care.    Electronically signed by: Deirdre Reyes CNP      No

## 2021-06-24 NOTE — PROGRESS NOTES
Code Status:  FULL CODE  Visit Type: Follow Up     Facility:  [facility] [[id number]]        Facility Type: SNF (Skilled Nursing Facility, TCU)    History of Present Illness: Julisa Chaney is a 73 y.o. female seen today for initial follow-up visit from hospitalization at [facility] 2/6 to 2/9/19.  She has a past medical history for trigeminal neuralgia and depression.  She had been having ongoing increased fatigue, low motivation, depression and low appetite for the past couple of months.  Prior to his hospitalization she was more sedated and encephalopathic and so was taken to the emergency room and found to be hypercalcemic.  PTH was normal, TSH was normal, vitamin D was low, B12 was normal, and a head CT showed no acute abnormalities.  It was presumed that her hypercalcemia was directly related to excessive intake of calcium Via Tums.  She did report that she does take a lot for indigestion.  She does have issues with chronic anemia and at discharge her hemoglobin was 9 and there was no evidence of bleeding.  During hospitalization she did present with bacterial conjunctivitis and was started on Polytrim drops to her left eye.    Today, she reports ongoing trigeminal neuralgia pain in which she typically takes Tylenol 3-4 times a day.  She also did not, with an order for her migraines and she typically takes Excedrin migraine.  She reports that she has lost a little over 10 pounds in the past couple of months related to her clinical status.  Her most recent labs drawn on 2/11 showed a sodium of 140, potassium 3.8, chloride of 105.  However the calcium was not checked.  She also started to have itching, thick matting and redness to her right eye and so yesterday she started on Polytrim drops in both eyes.  Her eyes look as if they are improving as the sclera is white with clear drainage.  She does complain of constipation and states prunes do work at home.    Review of Systems   Patient  denies fever, chills, headache, lightheadedness, dizziness, rhinorrhea, cough, congestion, shortness of breath, chest pain, palpitations, abdominal pain, n/v, diarrhea,  change in appetite, dysuria, frequency, burning or pain with urination.  Other than stated in HPI all other review of systems is negative.         Physical Exam   Vital signs: /60, heart rate 106, respiratory 18, temp 97.0, 94% on room air, weight at 92 pounds.  GENERAL APPEARANCE: Thin, cachectic frail elderly female in no acute distress.  HEENT: normocephalic, atraumatic  PERRL, sclerae anicteric, conjunctivae clear drainage with matting at the inner canthal folds, inner eyelids is pale EOM intact  Wears dentures.  NECK: Supple and symmetric. Trachea is midline, no thyromegaly, no adenopathy, and no tenderness  LUNGS: Lung sounds CTA, no adventitious sounds, respiratory effort normal.  CARD: RRR, S1, S2, without murmurs, gallops, rubs, no JVD,  ABD: Soft and nontender with normal bowel sounds.   MSK: Muscle strength and tone were normal.  EXTREMITIES: No cyanosis, clubbing or edema.  NEURO: Alert and oriented x 3. Normal affect.Face is symmetric.  SKIN: Inspection of the skin reveals no rashes, ulcerations or petechiae.  PSYCH: euthymic          Labs:    Recent Results (from the past 240 hour(s))   Basic Metabolic Panel   Result Value Ref Range    Sodium 144 136 - 145 mmol/L    Potassium 3.4 (L) 3.5 - 5.0 mmol/L    Chloride 99 98 - 107 mmol/L    CO2 32 (H) 22 - 31 mmol/L    Anion Gap, Calculation 13 5 - 18 mmol/L    Glucose 119 70 - 125 mg/dL    Calcium 14.1 (HH) 8.5 - 10.5 mg/dL    BUN 40 (H) 8 - 28 mg/dL    Creatinine 0.95 0.60 - 1.10 mg/dL    GFR MDRD Af Amer >60 >60 mL/min/1.73m2    GFR MDRD Non Af Amer 58 (L) >60 mL/min/1.73m2   Troponin I   Result Value Ref Range    Troponin I 0.05 0.00 - 0.29 ng/mL   HEM 2 Without Diff   Result Value Ref Range    WBC 11.6 (H) 4.0 - 11.0 thou/uL    RBC 5.41 (H) 3.80 - 5.40 mill/uL    Hemoglobin 11.7  (L) 12.0 - 16.0 g/dL    Hematocrit 41.8 35.0 - 47.0 %    MCV 77 (L) 80 - 100 fL    MCH 21.6 (L) 27.0 - 34.0 pg    MCHC 28.0 (L) 32.0 - 36.0 g/dL    RDW 17.8 (H) 11.0 - 14.5 %    Platelets 463 (H) 140 - 440 thou/uL    MPV 10.9 8.5 - 12.5 fL   Protime-INR   Result Value Ref Range    INR 0.97 0.90 - 1.10   APTT   Result Value Ref Range    PTT 24 24 - 37 seconds   Ammonia   Result Value Ref Range    Ammonia 22 11 - 35 umol/L   Hepatic Profile   Result Value Ref Range    Bilirubin, Total 0.2 0.0 - 1.0 mg/dL    Bilirubin, Direct 0.1 <=0.5 mg/dL    Protein, Total 7.3 6.0 - 8.0 g/dL    Albumin 4.0 3.5 - 5.0 g/dL    Alkaline Phosphatase 83 45 - 120 U/L    AST 16 0 - 40 U/L    ALT 11 0 - 45 U/L   Magnesium   Result Value Ref Range    Magnesium 2.0 1.8 - 2.6 mg/dL   Thyroid Stimulating Hormone (TSH)   Result Value Ref Range    TSH 0.80 0.30 - 5.00 uIU/mL   POCT Glucose   Result Value Ref Range    Glucose 112 70 - 139 mg/dL   ECG 12 lead   Result Value Ref Range    SYSTOLIC BLOOD PRESSURE  mmHg    DIASTOLIC BLOOD PRESSURE  mmHg    VENTRICULAR RATE 106 BPM    ATRIAL RATE 106 BPM    P-R INTERVAL 150 ms    QRS DURATION 84 ms    Q-T INTERVAL 316 ms    QTC CALCULATION (BEZET) 419 ms    P Axis 72 degrees    R AXIS 50 degrees    T AXIS -21 degrees    MUSE DIAGNOSIS       Sinus tachycardia  Minimal voltage criteria for LVH, may be normal variant  Septal infarct , age undetermined  Abnormal ECG  When compared with ECG of 03-OCT-2014 11:51,  Nonspecific T wave abnormality now evident in Inferior leads    Confirmed by CHAIM SCOTT MD LOC: ([id number]) on 2/7/2019 3:56:45 PM     Urinalysis-UC if Indicated   Result Value Ref Range    Color, UA Yellow Colorless, Yellow, Straw, Light Yellow    Clarity, UA Clear Clear    Glucose, UA Negative Negative    Bilirubin, UA Negative Negative    Ketones, UA Trace (!) Negative, 60 mg/dL    Specific Gravity, UA 1.014 1.001 - 1.030    Blood, UA Negative Negative    pH, UA 5.5 4.5 - 8.0    Protein, UA  Negative Negative mg/dL    Urobilinogen, UA <2.0 E.U./dL <2.0 E.U./dL, 2.0 E.U./dL    Nitrite, UA Negative Negative    Leukocytes, UA Large (!) Negative    Bacteria, UA Few (!) None Seen hpf    RBC, UA 0-2 None Seen, 0-2 hpf    WBC, UA 10-25 (!) None Seen, 0-5 hpf    Squam Epithel, UA 10-25 (!) None Seen, 0-5 lpf    Mucus, UA Few (!) None Seen lpf    Hyaline Casts, UA 10-25 (!) 0-5, None Seen lpf   Culture, Urine   Result Value Ref Range    Culture Mixture of urogenital organisms    Calcium, Ionized, Measured   Result Value Ref Range    Calcium, Ionized Measured 1.54 (H) 1.11 - 1.30 mmol/L    Calcium, Ionized pH 7.4 1.55 (H) 1.11 - 1.30 mmol/L    pH 7.41 7.35 - 7.45   BMP   Result Value Ref Range    Sodium 146 (H) 136 - 145 mmol/L    Potassium 3.1 (L) 3.5 - 5.0 mmol/L    Chloride 110 (H) 98 - 107 mmol/L    CO2 27 22 - 31 mmol/L    Anion Gap, Calculation 9 5 - 18 mmol/L    Glucose 71 70 - 125 mg/dL    Calcium 10.7 (H) 8.5 - 10.5 mg/dL    BUN 29 (H) 8 - 28 mg/dL    Creatinine 0.72 0.60 - 1.10 mg/dL    GFR MDRD Af Amer >60 >60 mL/min/1.73m2    GFR MDRD Non Af Amer >60 >60 mL/min/1.73m2   PTH   Result Value Ref Range    PTH 18 10 - 86 pg/mL   Albumin - Moderate Hypercalcemia (Corrected Calcium 12.1-14 mg/dL)   Result Value Ref Range    Albumin 2.8 (L) 3.5 - 5.0 g/dL   Magnesium   Result Value Ref Range    Magnesium 1.6 (L) 1.8 - 2.6 mg/dL   Vitamin D, Total (25-Hydroxy)   Result Value Ref Range    Vitamin D, Total (25-Hydroxy) 14.1 (L) 30.0 - 80.0 ng/mL   Calcium - Moderate Hypercalcemia (Corrected Calcium 12.1-14 mg/dL)   Result Value Ref Range    Calcium 9.8 8.5 - 10.5 mg/dL   Albumin - Moderate Hypercalcemia (Corrected Calcium 12.1-14 mg/dL)   Result Value Ref Range    Albumin 2.7 (L) 3.5 - 5.0 g/dL   Potassium   Result Value Ref Range    Potassium 3.1 (L) 3.5 - 5.0 mmol/L   Calcium, Ionized, Measured   Result Value Ref Range    Calcium, Ionized Measured 1.28 1.11 - 1.30 mmol/L    Calcium, Ionized pH 7.4 1.26 1.11 -  1.30 mmol/L    pH 7.37 7.35 - 7.45   BMP   Result Value Ref Range    Sodium 144 136 - 145 mmol/L    Potassium 2.9 (L) 3.5 - 5.0 mmol/L    Chloride 110 (H) 98 - 107 mmol/L    CO2 28 22 - 31 mmol/L    Anion Gap, Calculation 6 5 - 18 mmol/L    Glucose 75 70 - 125 mg/dL    Calcium 9.1 8.5 - 10.5 mg/dL    BUN 11 8 - 28 mg/dL    Creatinine 0.65 0.60 - 1.10 mg/dL    GFR MDRD Af Amer >60 >60 mL/min/1.73m2    GFR MDRD Non Af Amer >60 >60 mL/min/1.73m2   Magnesium   Result Value Ref Range    Magnesium 1.7 (L) 1.8 - 2.6 mg/dL   Calcium - Moderate Hypercalcemia (Corrected Calcium 12.1-14 mg/dL)   Result Value Ref Range    Calcium 9.1 8.5 - 10.5 mg/dL   Albumin - Moderate Hypercalcemia (Corrected Calcium 12.1-14 mg/dL)   Result Value Ref Range    Albumin 2.7 (L) 3.5 - 5.0 g/dL   HM1 (CBC with Diff)   Result Value Ref Range    WBC 10.4 4.0 - 11.0 thou/uL    RBC 3.93 3.80 - 5.40 mill/uL    Hemoglobin 8.6 (L) 12.0 - 16.0 g/dL    Hematocrit 30.6 (L) 35.0 - 47.0 %    MCV 78 (L) 80 - 100 fL    MCH 21.9 (L) 27.0 - 34.0 pg    MCHC 28.1 (L) 32.0 - 36.0 g/dL    RDW 17.9 (H) 11.0 - 14.5 %    Platelets 334 140 - 440 thou/uL    MPV 11.0 8.5 - 12.5 fL    Neutrophils % 70 50 - 70 %    Lymphocytes % 23 20 - 40 %    Monocytes % 6 2 - 10 %    Eosinophils % 1 0 - 6 %    Basophils % 0 0 - 2 %    Neutrophils Absolute 7.2 2.0 - 7.7 thou/uL    Lymphocytes Absolute 2.4 0.8 - 4.4 thou/uL    Monocytes Absolute 0.6 0.0 - 0.9 thou/uL    Eosinophils Absolute 0.1 0.0 - 0.4 thou/uL    Basophils Absolute 0.0 0.0 - 0.2 thou/uL   Vitamin B12   Result Value Ref Range    Vitamin B-12 450 213 - 816 pg/mL   Calcium - Moderate Hypercalcemia (Corrected Calcium 12.1-14 mg/dL)   Result Value Ref Range    Calcium 9.4 8.5 - 10.5 mg/dL   Albumin - Moderate Hypercalcemia (Corrected Calcium 12.1-14 mg/dL)   Result Value Ref Range    Albumin 2.7 (L) 3.5 - 5.0 g/dL   Potassium   Result Value Ref Range    Potassium 5.2 (H) 3.5 - 5.0 mmol/L   Calcium, Ionized, Measured   Result  Value Ref Range    Calcium, Ionized Measured 1.34 (H) 1.11 - 1.30 mmol/L    Calcium, Ionized pH 7.4 1.33 (H) 1.11 - 1.30 mmol/L    pH 7.39 7.35 - 7.45   BMP   Result Value Ref Range    Sodium 140 136 - 145 mmol/L    Potassium 4.6 3.5 - 5.0 mmol/L    Chloride 109 (H) 98 - 107 mmol/L    CO2 27 22 - 31 mmol/L    Anion Gap, Calculation 4 (L) 5 - 18 mmol/L    Glucose 86 70 - 125 mg/dL    Calcium 9.4 8.5 - 10.5 mg/dL    BUN 8 8 - 28 mg/dL    Creatinine 0.65 0.60 - 1.10 mg/dL    GFR MDRD Af Amer >60 >60 mL/min/1.73m2    GFR MDRD Non Af Amer >60 >60 mL/min/1.73m2   Albumin - Moderate Hypercalcemia (Corrected Calcium 12.1-14 mg/dL)   Result Value Ref Range    Albumin 2.7 (L) 3.5 - 5.0 g/dL   HM1 (CBC with Diff)   Result Value Ref Range    WBC 10.8 4.0 - 11.0 thou/uL    RBC 4.05 3.80 - 5.40 mill/uL    Hemoglobin 9.0 (L) 12.0 - 16.0 g/dL    Hematocrit 31.5 (L) 35.0 - 47.0 %    MCV 78 (L) 80 - 100 fL    MCH 22.2 (L) 27.0 - 34.0 pg    MCHC 28.6 (L) 32.0 - 36.0 g/dL    RDW 18.4 (H) 11.0 - 14.5 %    Platelets 360 140 - 440 thou/uL    MPV 10.5 8.5 - 12.5 fL    Neutrophils % 73 (H) 50 - 70 %    Lymphocytes % 19 (L) 20 - 40 %    Monocytes % 6 2 - 10 %    Eosinophils % 1 0 - 6 %    Basophils % 1 0 - 2 %    Neutrophils Absolute 7.9 (H) 2.0 - 7.7 thou/uL    Lymphocytes Absolute 2.1 0.8 - 4.4 thou/uL    Monocytes Absolute 0.6 0.0 - 0.9 thou/uL    Eosinophils Absolute 0.1 0.0 - 0.4 thou/uL    Basophils Absolute 0.1 0.0 - 0.2 thou/uL   Basic Metabolic Panel   Result Value Ref Range    Sodium 140 136 - 145 mmol/L    Potassium 3.8 3.5 - 5.0 mmol/L    Chloride 105 98 - 107 mmol/L    CO2 28 22 - 31 mmol/L    Anion Gap, Calculation 7 5 - 18 mmol/L    Glucose 98 70 - 125 mg/dL    Calcium 9.9 8.5 - 10.5 mg/dL    BUN 9 8 - 28 mg/dL    Creatinine 0.67 0.60 - 1.10 mg/dL    GFR MDRD Af Amer >60 >60 mL/min/1.73m2    GFR MDRD Non Af Amer >60 >60 mL/min/1.73m2   Magnesium   Result Value Ref Range    Magnesium 2.2 1.8 - 2.6 mg/dL         Assessment:  1.  Hypercalcemia     2. Encephalopathy     3. Intractable migraine without aura and without status migrainosus     4. Weight loss     5. Other constipation     6. Trigeminal neuralgia     7. Anemia, unspecified type         Plan:   Hypercalcemia: We will draw a calcium level and monitor.  Encephalopathy: Resolved  Migraine: We will add Excedrin Migraine  Weight loss: Add house supplement daily and monitor appetite  Constipation: Offer prunes and add MiraLAX as needed  Trigeminal neuralgia: Increase Tylenol 3 to 4 times a day  Anemia: Draw CBC          Electronically signed by: Deirdre Reyes CNP

## 2021-06-24 NOTE — PROGRESS NOTES
[facility] Patient Outreach:     Discharge Date::  2/28/19      Reason for TCU stay (discharge diagnosis)::  Hypercalcemia, encephalopathy, trigeminal neuralgia, depression      Are you feeling better, the same or worse since your discharge?:  Patient is feeling better          As part of your discharge plan, did they discuss home care with you?: No            Did you receive any new medications, or was there a change to your medications?: Yes        Are you taking those medications, or do you have any established regiment?:  Some new medications----patient can't recall off hand.  She will bring her TCU discharge paperwork with her to PCP appt on 3/5/19.        Do you have any follow up visits scheduled with your PCP or Specialist?:  Yes, with PCP      (RN) Is it scheduled soon enough (3-5 days)?: Yes        (RN) Is the patient okay with moving appointment up (if RN feels appropriate)?: No

## 2021-06-24 NOTE — PROGRESS NOTES
Assessment   Julisa Chaney is a 73 y.o. female who is establish patient to my practice here with   Chief Complaint   Patient presents with     Hospital Visit Follow Up     [place]'s and pink eye - believes she is still having sx     Medication Management     acid reflux meds and depession        Julisa was seen today for hospital visit follow up and medication management.    Diagnoses and all orders for this visit:    Hospital discharge follow-up    Moderate episode of recurrent major depressive disorder (H)  Comments:  will incease the dose to 50 mg as feeling quite sad all the time   Orders:  -     sertraline (ZOLOFT) 50 MG tablet; Take 1 tablet (50 mg total) by mouth daily.    Other chronic gastritis without hemorrhage  Comments:  patient adv to start pilosec first thing in the morning     Iron deficiency anemia secondary to inadequate dietary iron intake  -     Hemoglobin    Hypercalcemia  -     Calcium    Protein-calorie malnutrition, severe (H)  -     Prealbumin    Trigeminal neuralgia    Intractable migraine without aura and without status migrainosus    Controlled substance agreement signed    Mild episode of recurrent major depressive disorder (H)  Comments:  feels nothing make her happy any more and no motivation to do anything for renea   Orders:  -     sertraline (ZOLOFT) 50 MG tablet; Take 1 tablet (50 mg total) by mouth daily.    Loose, teeth  Comments:  not able to eat much b/c of it     Other orders  -     nortriptyline (PAMELOR) 10 MG capsule; 2 cap twice daily          Plan:     Patient Instructions   Dear Julisa,    I am glad you are feeling better    Goal is to get enough nutrition, please talk to the pharmacist if they can suggest a better brand for you which you would like I am unable to find the name of the protein drink they were giving you at the hospital  We are checking your calcium, hemoglobin, also protein level just to see how you recovering, will let you know about your result  as soon as I can  Increase the dose of his Zoloft to  50 mg so you can finish taking current prescription of 25 mg by  taking 2 tablets every day for you fill the new prescription for 50 mg  He came who is her care coordinator also will talk to you before you leave from the clinic today  The medication no change  We will refill your Tylenol No. 3 as needed  To new walking at home rebuild the stamina.  Eat more frequently small meals throughout the day drink lots of liquids  Try to avoid Tums or any calcium supplement for now  Restart your vitamin D and Prilosec which you already have at home  Follow-up in 3 months    Mara Murillo MD 3/5/2019 12:00 PM            Subjective:      HPI: Julisa Chaney is a 73 y.o. female   who had a recent hospitalization at [facility] 2/6 to 2/9/19.   She had been having ongoing increased fatigue, low motivation, depression and low appetite for the past couple of months.  Prior to her  hospitalization she was more sedated and encephalopathic , only thing came back + was elevated calcium level with normal PTH and thyroid hormone , she was also malnourished with very low hemoglobin     Hospital notes, lab results, Procedures performed  And  imagings were reviewed with patient and also Follow up recommendations for primary care providers     Nursing home  discharge michaela:.  At the TCU she was able to progress her therapies to be independent with ambulation and all ADLs.  Her hypercalcemia has remained resolved and stable with her last calcium being 9.4.  Her malnutrition she was treated with oral supplements twice daily and high calorie foods.  She has had some weight gain of approximately 4 pounds while at the TCU.  For her trigeminal neuralgia we have added back her nortriptyline at a lower dose of 50 mg at bedtime.  She reports today her trigeminal neuralgia is back to baseline.  She has also had some issues with sleeping which she states has improved with adding back in  the nortriptyline and taking Vistaril x2 nights.  During her stay she had ongoing epigastric pain.  Her hemoglobin on 2/19 went down to 7.9 down from 9 in the hospital.  I started her on omeprazole at 20 mg daily she reports her epigastric pain has resolved.  Guaiacs of stools were negative for blood and she was negative for H. pylori.  Her recheck of hemoglobin today was better at 8.7.  She will need follow-up with her primary provider in regards to her anemia and gastritis.     Since discharge from the hospital, the patient has been feeling tired  Able to eat Ok , not much weight gain since discharge from the nursing home as she lost 2 pounds, patient feel her biggest problem is her teeth as she unable to chew anything and wondering if she can help her teeth pulled out so she can have her denture and able to eat better I believe that will be the best option.  And then able to find a good protein shake which she can drink easily every day at least 2 times a day to help regain some muscle strength and help with the weight gain.  Denies any new symptoms of chest pain shortness of breath nausea vomiting diarrhea or constipation, no new symptoms of joint pain chronic headaches sleep is not the best but able to sleep okay, she feels her depression is getting worse     PHQ=10  YUE=5     Medication reconciliation was completed    Based on what occurred in the visit today:    Previous medication(s) were discontinued or altered? dosage change zoloft to 50 mg    Previous medication(s) were suspended pending consultation? no change    New medication(s) started? not currently prescribed medications      Social History     Social History Narrative    Lives alone in the house, relay in TV dinner    [employer] help with house maintenance    Daughter lives close by.    Mara Murillo MD 7/9/2018 1:49 PM         Past Medical History:   Diagnosis Date     High cholesterol      Migraines      Past Surgical History:   Procedure  Laterality Date     [place] TOTAL ABDOM HYSTERECTOMY      Description: Total Abdominal Hysterectomy;  Recorded: 08/17/2010;     Mirtazapine and Penicillins  Current Outpatient Medications   Medication Sig Dispense Refill     acetaminophen-codeine (TYLENOL #3) 300-30 mg per tablet TAKE 1 TABLET BY MOUTH THREE TIMES DAILY AS NEEDED FOR PAIN (Patient taking differently: TAKE 1 TABLET BY MOUTH EVERY 6 HOURS AS NEEDED FOR PAIN) 120 tablet 0     ferrous sulfate 325 (65 FE) MG tablet Take 1 tablet by mouth 2 (two) times a day.       multivitamin (ONE A DAY) per tablet Take 1 tablet by mouth daily. 120 tablet 2     nortriptyline (PAMELOR) 10 MG capsule 2 cap twice daily 360 capsule 3     omeprazole (PRILOSEC) 20 MG capsule Take 20 mg by mouth daily before breakfast.       OXcarbazepine (TRILEPTAL) 150 MG tablet Take 3 tablets (450 mg total) by mouth at bedtime. 270 tablet 3     polyvinyl alcohol (LIQUIFILM TEARS) 1.4 % ophthalmic solution Administer 2 drops to both eyes as needed for dry eyes.       sertraline (ZOLOFT) 50 MG tablet Take 1 tablet (50 mg total) by mouth daily. 90 tablet 3     topiramate (TOPAMAX) 50 MG tablet Take 1 tablet (50 mg total) by mouth daily. 90 tablet 3     aspirin-acetaminophen-caffeine (EXCEDRIN MIGRAINE) 250-250-65 mg per tablet Take 1 tablet by mouth every 6 (six) hours as needed for pain.       cholecalciferol, vitamin D3, (VITAMIN D3) 2,000 unit Tab One tab daily (Patient taking differently: Take 2,000 Units by mouth daily. One tab daily      ) 90 tablet 3     No current facility-administered medications for this visit.      Family History   Problem Relation Age of Onset     Cancer Father      Testicular cancer Grandchild 17     Breast cancer Mother      Breast cancer Sister      Breast cancer Maternal Aunt      Breast cancer Sister        Patient Active Problem List   Diagnosis     Osteopenia     Hyperlipidemia     Migraine Headache     Trigeminal neuralgia     Underweight     Protein-calorie  malnutrition, severe (H)     Counseling regarding advanced directives and goals of care     Controlled substance agreement signed     Hypercalcemia       Review of Systems    Pertinent ROS noted in HPI        Objective:     Vitals:    03/05/19 1135   BP: 104/56   Pulse: 72   Weight: 101 lb 4.8 oz (45.9 kg)       Physical Exam:   Physical Exam:  General Appearance:  Appears comfortable, Alert, cooperative, no distress,   Head: Normocephalic, without obvious abnormality, atraumatic  Eyes: PERRL, conjunctiva/corneas clear, EOM's intact, both eyes             Nose: Nares normal, no drainage   Throat: Lips, mucosa, and tongue normal; teeth and gums normal  Neck: Supple, symmetrical, trachea midline, no adenopathy;                      Lungs: Clear to auscultation bilaterally, respirations unlabored  Chest Wall: No tenderness or deformity  Heart: Regular rate and rhythm, S1 and S2 normal, no murmur, rubs or gallop  Abdomen: Soft, non-tender, bowel sounds active all four quadrants,   no masses, no organomegaly  Extremities: Extremities normal, atraumatic, no cyanosis or edema  Pulses: DP pulses are 1-2+ bilat.    Skin: no rashes or lesions  Neurologic: normal and equal strength bilat in upper and lower extremities     Mara Murillo MD    Patient Instructions   Dear Julisa,    I am glad you are feeling better    Goal is to get enough nutrition, please talk to the pharmacist if they can suggest a better brand for you which you would like I am unable to find the name of the protein drink they were giving you at the hospital  We are checking your calcium, hemoglobin, also protein level just to see how you recovering, will let you know about your result as soon as I can  Increase the dose of his Zoloft to  50 mg so you can finish taking current prescription of 25 mg by  taking 2 tablets every day for you fill the new prescription for 50 mg  He came who is her care coordinator also will talk to you before you leave from the  clinic today  The medication no change  We will refill your Tylenol No. 3 as needed  To new walking at home rebuild the stamina.  Eat more frequently small meals throughout the day drink lots of liquids  Try to avoid Tums or any calcium supplement for now  Restart your vitamin D and Prilosec which you already have at home  Follow-up in 3 months    Mara Murillo MD 3/5/2019 12:00 PM

## 2021-06-24 NOTE — PROGRESS NOTES
Code Status:  FULL CODE  Visit Type: Follow Up     Facility:  [facility] [[id number]]        Facility Type: SNF (Skilled Nursing Facility, TCU)    History of Present Illness: Julisa Chaney is a 73 y.o. female seen for problem visit per nursing request.  She had a recent hospitalization at [facility] 2/6 to 2/9/19.  She has a past medical history for trigeminal neuralgia and depression.  She had been having ongoing increased fatigue, low motivation, depression and low appetite for the past couple of months.  Prior to his hospitalization she was more sedated and encephalopathic and so was taken to the emergency room and found to be hypercalcemic.  PTH was normal, TSH was normal, vitamin D was low, B12 was normal, and a head CT showed no acute abnormalities.  It was presumed that her hypercalcemia was directly related to excessive intake of calcium Via Tums.  She did report that she does take a lot for indigestion.  She does have issues with chronic anemia and at discharge her hemoglobin was 9 and there was no evidence of bleeding.  During hospitalization she did present with bacterial conjunctivitis and was started on Polytrim drops to her left eye.    Today, she reports that her sleeping at night has improved along with her GERD.  She continues to have mattery in her eyes with itching.  The sclera is white.  Her labs showed severe malnutrition with a prealbumin 17.7 and albumin 2.7.  She reports she is taking the supplements.  She does show a 2 pound weight loss from her admission here.  However she states her hospital weight was 88 pounds and her admission weight here was 92 pounds.  So likely she has improved her weight from her hospitalization.  Her ferritin level was low and her iron supplement was increased to twice daily.  She reports tolerating this well and does not complain of an upset stomach.  However, she does have ongoing issues with constipation and reports she finally had a  bowel movement today using the as needed MiraLAX.  She reports when she is constipated at home she gets good relief with enemas.  Hemoglobin today was 7.9 which is down from 9.9 last week.  She denies any symptoms of dizziness, headaches or lightheadedness.    Review of Systems   Patient denies fever, chills, headache, lightheadedness, dizziness, rhinorrhea, cough, congestion, shortness of breath, chest pain, palpitations, abdominal pain, n/v, diarrhea,  change in appetite, dysuria, frequency, burning or pain with urination.  Other than stated in HPI all other review of systems is negative.         Physical Exam    Vital signs: /46, heart rate 66, respiratory 18, temp 97.9.  GENERAL APPEARANCE: Thin, cachectic frail elderly female in no acute distress.  HEENT: normocephalic, atraumatic  PERRL, sclerae anicteric, conjunctivae clear, some mattery on the eyelashes of bilateral eyes inner eyelids is pale EOM intact  Wears dentures.  NECK: Supple and symmetric. Trachea is midline, no thyromegaly, no adenopathy, and no tenderness  LUNGS: Lung sounds CTA, no adventitious sounds, respiratory effort normal.  CARD: RRR, S1, S2, without murmurs, gallops, rubs, no JVD,  ABD: Soft and nontender with normal bowel sounds.  She does have audible heart sounds and her abdomen however she does not have aortic pulsation noted.  MSK: Muscle strength and tone were normal.  EXTREMITIES: No cyanosis, clubbing or edema.  NEURO: Alert and oriented x 3. Normal affect.Face is symmetric.  SKIN: Inspection of the skin reveals no rashes, ulcerations or petechiae.  PSYCH: euthymic          Labs:    Recent Results (from the past 240 hour(s))   Basic Metabolic Panel   Result Value Ref Range    Sodium 140 136 - 145 mmol/L    Potassium 3.8 3.5 - 5.0 mmol/L    Chloride 105 98 - 107 mmol/L    CO2 28 22 - 31 mmol/L    Anion Gap, Calculation 7 5 - 18 mmol/L    Glucose 98 70 - 125 mg/dL    Calcium 9.9 8.5 - 10.5 mg/dL    BUN 9 8 - 28 mg/dL     Creatinine 0.67 0.60 - 1.10 mg/dL    GFR MDRD Af Amer >60 >60 mL/min/1.73m2    GFR MDRD Non Af Amer >60 >60 mL/min/1.73m2   Magnesium   Result Value Ref Range    Magnesium 2.2 1.8 - 2.6 mg/dL   Calcium   Result Value Ref Range    Calcium 9.4 8.5 - 10.5 mg/dL   HM1 (CBC with Diff)   Result Value Ref Range    WBC 6.6 4.0 - 11.0 thou/uL    RBC 4.61 3.80 - 5.40 mill/uL    Hemoglobin 9.9 (L) 12.0 - 16.0 g/dL    Hematocrit 36.0 35.0 - 47.0 %    MCV 78 (L) 80 - 100 fL    MCH 21.5 (L) 27.0 - 34.0 pg    MCHC 27.5 (L) 32.0 - 36.0 g/dL    RDW 19.0 (H) 11.0 - 14.5 %    Platelets 595 (H) 140 - 440 thou/uL    MPV 11.0 8.5 - 12.5 fL    Neutrophils % 47 (L) 50 - 70 %    Lymphocytes % 42 (H) 20 - 40 %    Monocytes % 7 2 - 10 %    Eosinophils % 3 0 - 6 %    Basophils % 1 0 - 2 %    Neutrophils Absolute 3.1 2.0 - 7.7 thou/uL    Lymphocytes Absolute 2.8 0.8 - 4.4 thou/uL    Monocytes Absolute 0.5 0.0 - 0.9 thou/uL    Eosinophils Absolute 0.2 0.0 - 0.4 thou/uL    Basophils Absolute 0.1 0.0 - 0.2 thou/uL   Albumin   Result Value Ref Range    Albumin 2.7 (L) 3.5 - 5.0 g/dL   Ferritin   Result Value Ref Range    Ferritin 6 (L) 10 - 130 ng/mL   Prealbumin   Result Value Ref Range    Prealbumin 17.7 (L) 19.0 - 38.0 mg/dL   HM1 (CBC with Diff)   Result Value Ref Range    WBC 6.4 4.0 - 11.0 thou/uL    RBC 3.67 (L) 3.80 - 5.40 mill/uL    Hemoglobin 7.9 (L) 12.0 - 16.0 g/dL    Hematocrit 28.3 (L) 35.0 - 47.0 %    MCV 77 (L) 80 - 100 fL    MCH 21.5 (L) 27.0 - 34.0 pg    MCHC 27.9 (L) 32.0 - 36.0 g/dL    RDW 19.2 (H) 11.0 - 14.5 %    Platelets 585 (H) 140 - 440 thou/uL    MPV 9.4 8.5 - 12.5 fL    Neutrophils % 39 (L) 50 - 70 %    Lymphocytes % 50 (H) 20 - 40 %    Monocytes % 8 2 - 10 %    Eosinophils % 3 0 - 6 %    Basophils % 1 0 - 2 %    Neutrophils Absolute 2.5 2.0 - 7.7 thou/uL    Lymphocytes Absolute 3.2 0.8 - 4.4 thou/uL    Monocytes Absolute 0.5 0.0 - 0.9 thou/uL    Eosinophils Absolute 0.2 0.0 - 0.4 thou/uL    Basophils Absolute 0.1 0.0 -  0.2 thou/uL         Assessment:  1. Moderate protein-calorie malnutrition (H)     2. Trigeminal neuralgia     3. Reflux gastritis     4. Anemia, unspecified type     5. Hypercalcemia     6. Other constipation     7. Acute conjunctivitis of both eyes, unspecified acute conjunctivitis type         Plan:   Malnutrition: Counseled patient on importance of adequate nutrition and ingesting supplements daily.  Trigeminal neuralgia: Patient reports this is improved with the restarting of nortriptyline.  Gastritis: Patient reports this is improved with omeprazole.  However her hemoglobin has dropped 2 g in the past week so we will test her her for H. pylori stool antigen for possible ulcer.  Anemia: Recheck hemoglobin on Thursday continue ferrous sulfate twice daily.  Also guaiac stools x3  Hypercalcemia: Resolved  Constipation: Add bisacodyl enema as needed daily for constipation.  Conjunctivitis: This is improved with the polymyxin drops however I am guessing this is allergic conjunctivitis and will eventually need an antihistamine drop in order to have long-term improvement.  Will continue to monitor at this time.        35 minutes was spent face-to-face with patient discussing current clinical status, medication review with side effects and response, lab review and discussion of plan of care regarding malnutrition, anemia and conjunctivitis    Electronically signed by: Deirdre Reyes, CNP

## 2021-06-24 NOTE — PROGRESS NOTES
Code Status:  FULL CODE  Visit Type: Follow Up     Facility:  [facility] [[id number]]        Facility Type: SNF (Skilled Nursing Facility, TCU)    History of Present Illness: Julisa Chaney is a 73 y.o. female seen for problem visit per nursing request.  She had a recent hospitalization at [facility] 2/6 to 2/9/19.  She has a past medical history for trigeminal neuralgia and depression.  She had been having ongoing increased fatigue, low motivation, depression and low appetite for the past couple of months.  Prior to his hospitalization she was more sedated and encephalopathic and so was taken to the emergency room and found to be hypercalcemic.  PTH was normal, TSH was normal, vitamin D was low, B12 was normal, and a head CT showed no acute abnormalities.  It was presumed that her hypercalcemia was directly related to excessive intake of calcium Via Tums.  She did report that she does take a lot for indigestion.  She does have issues with chronic anemia and at discharge her hemoglobin was 9 and there was no evidence of bleeding.  During hospitalization she did present with bacterial conjunctivitis and was started on Polytrim drops to her left eye.    Today, she reports improvement in her gastric discomfort omeprazole.  Guaiacs of stools were negative and H. pylori was negative.  Her conjunctivitis has also resolved and she has stopped the Polytrim drops.  Her appetite is improving and she is drinking all the supplement drinks.  She has gained 5 pounds since her admission.  And is advancing well in therapy.     Review of Systems   Patient denies fever, chills, headache, lightheadedness, dizziness, rhinorrhea, cough, congestion, shortness of breath, chest pain, palpitations, abdominal pain, n/v, diarrhea,  change in appetite, dysuria, frequency, burning or pain with urination.  Other than stated in HPI all other review of systems is negative.         Physical Exam    Vital signs: 117/51, heart  rate 71, respiratory 18, temp 96.8, weight 95 pounds.  GENERAL APPEARANCE: Thin, cachectic frail elderly female in no acute distress.  HEENT: normocephalic, atraumatic  PERRL, sclerae anicteric, conjunctivae clear, EOM intact  Wears dentures.  LUNGS: Lung sounds CTA, no adventitious sounds, respiratory effort normal.  CARD: RRR, S1, S2, without murmurs, gallops, rubs, no JVD,  ABD: Soft and nontender with normal bowel sounds.  She does have audible heart sounds and her abdomen however she does not have aortic pulsation noted.  MSK: Muscle strength and tone were normal.  EXTREMITIES: No cyanosis, clubbing or edema.  NEURO: Alert and oriented x 3. Normal affect.Face is symmetric.  SKIN: Inspection of the skin reveals no rashes, ulcerations or petechiae.  PSYCH: euthymic          Labs:    Recent Results (from the past 240 hour(s))   Albumin   Result Value Ref Range    Albumin 2.7 (L) 3.5 - 5.0 g/dL   Ferritin   Result Value Ref Range    Ferritin 6 (L) 10 - 130 ng/mL   Prealbumin   Result Value Ref Range    Prealbumin 17.7 (L) 19.0 - 38.0 mg/dL   HM1 (CBC with Diff)   Result Value Ref Range    WBC 6.4 4.0 - 11.0 thou/uL    RBC 3.67 (L) 3.80 - 5.40 mill/uL    Hemoglobin 7.9 (L) 12.0 - 16.0 g/dL    Hematocrit 28.3 (L) 35.0 - 47.0 %    MCV 77 (L) 80 - 100 fL    MCH 21.5 (L) 27.0 - 34.0 pg    MCHC 27.9 (L) 32.0 - 36.0 g/dL    RDW 19.2 (H) 11.0 - 14.5 %    Platelets 585 (H) 140 - 440 thou/uL    MPV 9.4 8.5 - 12.5 fL    Neutrophils % 39 (L) 50 - 70 %    Lymphocytes % 50 (H) 20 - 40 %    Monocytes % 8 2 - 10 %    Eosinophils % 3 0 - 6 %    Basophils % 1 0 - 2 %    Neutrophils Absolute 2.5 2.0 - 7.7 thou/uL    Lymphocytes Absolute 3.2 0.8 - 4.4 thou/uL    Monocytes Absolute 0.5 0.0 - 0.9 thou/uL    Eosinophils Absolute 0.2 0.0 - 0.4 thou/uL    Basophils Absolute 0.1 0.0 - 0.2 thou/uL   Hemoglobin   Result Value Ref Range    Hemoglobin 7.9 (L) 12.0 - 16.0 g/dL   H. pylori Antigen, Stool   Result Value Ref Range    Specimen  Description Feces     H pylori Antigen SEE NOTES     Report Status FINAL 02/22/2019          Assessment:  1. Reflux gastritis     2. Epigastric pain     3. Weight loss     4. Moderate protein-calorie malnutrition (H)     5. Trigeminal neuralgia     6. Acute conjunctivitis of both eyes, unspecified acute conjunctivitis type     7. Anemia, unspecified type         Plan:   Reflux gastritis and epigastric pain: Continue omeprazole.  Weight loss: Improving continue with supplements and encouraging oral intake.  Trigeminal neuralgia: Continue nortriptyline, Trileptal, Topamax, and Tylenol 3.  Conjunctivitis: Resolved will start on liquid tears for dry eyes.  Anemia: Will draw CBC this week.      Electronically signed by: Deirdre Reyes CNP

## 2021-06-24 NOTE — PROGRESS NOTES
Code Status:  FULL CODE  Visit Type: Discharge Summary     Facility:  [facility] [[id number]]          PCP:  Mara Murillo MD  [phone]       Admission Date to our Facility: 2/9/2019 Discharge Date from our Facility: 2/28/2019    Discharge Diagnosis:    1. Hypercalcemia     2. Trigeminal neuralgia     3. Reflux gastritis     4. Epigastric pain     5. Moderate protein-calorie malnutrition (H)     6. Intractable migraine without aura and without status migrainosus     7. Dry eyes          History of Present Illness: Julisa Chaney is a 73 y.o. female who had a recent hospitalization at [facility] 2/6 to 2/9/19.  She has a past medical history for trigeminal neuralgia and depression.  She had been having ongoing increased fatigue, low motivation, depression and low appetite for the past couple of months.  Prior to his hospitalization she was more sedated and encephalopathic and so was taken to the emergency room and found to be hypercalcemic.  PTH was normal, TSH was normal, vitamin D was low, B12 was normal, and a head CT showed no acute abnormalities.  It was presumed that her hypercalcemia was directly related to excessive intake of calcium Via Tums.  She did report that she does take a lot for indigestion.  She does have issues with chronic anemia and at discharge her hemoglobin was 9 and there was no evidence of bleeding.  During hospitalization she did present with bacterial conjunctivitis and was started on Polytrim drops to her left eye.        Skilled Nursing Facility Course: At the TCU she was able to progress her therapies to be independent with ambulation and all ADLs.  Her hypercalcemia has remained resolved and stable with her last calcium being 9.4.  Her malnutrition she was treated with oral supplements twice daily and high calorie foods.  She has had some weight gain of approximately 4 pounds while at the [facility].  For her trigeminal neuralgia we have added back her  nortriptyline at a lower dose of 50 mg at bedtime.  She reports today her trigeminal neuralgia is back to baseline.  She has also had some issues with sleeping which she states has improved with adding back in the nortriptyline and taking Vistaril x2 nights.  During her stay she had ongoing epigastric pain.  Her hemoglobin on 2/19 went down to 7.9 down from 9 in the hospital.  I started her on omeprazole at 20 mg daily she reports her epigastric pain has resolved.  Guaiacs of stools were negative for blood and she was negative for H. pylori.  Her recheck of hemoglobin today was better at 8.7.  She will need follow-up with her primary provider in regards to her anemia and gastritis.    Discharge Medications:    Current Outpatient Medications   Medication Sig Dispense Refill     acetaminophen-codeine (TYLENOL #3) 300-30 mg per tablet TAKE 1 TABLET BY MOUTH THREE TIMES DAILY AS NEEDED FOR PAIN (Patient taking differently: TAKE 1 TABLET BY MOUTH EVERY 6 HOURS AS NEEDED FOR PAIN) 120 tablet 0     aspirin-acetaminophen-caffeine (EXCEDRIN MIGRAINE) 250-250-65 mg per tablet Take 1 tablet by mouth every 6 (six) hours as needed for pain.       cholecalciferol, vitamin D3, (VITAMIN D3) 2,000 unit Tab One tab daily (Patient taking differently: Take 2,000 Units by mouth daily. One tab daily      ) 90 tablet 3     ferrous sulfate 325 (65 FE) MG tablet Take 1 tablet by mouth 2 (two) times a day.       multivitamin (ONE A DAY) per tablet Take 1 tablet by mouth daily. 120 tablet 2     nortriptyline (PAMELOR) 10 MG capsule Take 50 mg by mouth at bedtime.       omeprazole (PRILOSEC) 20 MG capsule Take 20 mg by mouth daily before breakfast.       OXcarbazepine (TRILEPTAL) 150 MG tablet Take 3 tablets (450 mg total) by mouth at bedtime. 270 tablet 3     polyvinyl alcohol (LIQUIFILM TEARS) 1.4 % ophthalmic solution Administer 2 drops to both eyes as needed for dry eyes.       sertraline (ZOLOFT) 25 MG tablet Take 1 tablet (25 mg total)  by mouth daily. 90 tablet 0     topiramate (TOPAMAX) 50 MG tablet Take 1 tablet (50 mg total) by mouth daily. 90 tablet 3     No current facility-administered medications for this visit.        For most current and accurate medication list, please contact the skilled nursing facility that this patient visit took place at.      Discharge Plan: Discharge to home with family checks.  She will need to follow-up with PCP within 7-10 days of being discharged from TCU.  She will need follow-up hemoglobin and gastritis check after starting omeprazole.    Review of Systems   Patient denies fever, chills, headache, lightheadedness, dizziness, rhinorrhea, cough, congestion, shortness of breath, chest pain, palpitations, abdominal pain, n/v, diarrhea, constipation, change in appetite, dysuria, frequency, burning or pain with urination.  Other than stated in HPI all other review of systems is negative.         Physical Exam   Vital signs: /50, heart rate 66, respiratory 18, temp 97.5, weight 95.1 pounds  GENERAL APPEARANCE: Thin, cachectic frail elderly female in no acute distress.  HEENT: normocephalic, atraumatic  PERRL, sclerae anicteric, conjunctivae clear, EOM intact  Wears dentures.  LUNGS: Lung sounds CTA, no adventitious sounds, respiratory effort normal.  CARD: RRR, S1, S2, without murmurs, gallops, rubs, no JVD,  ABD: Soft and nontender with normal bowel sounds.  She does have audible heart sounds and her abdomen however she does not have aortic pulsation noted.  MSK: Muscle strength and tone were normal.  EXTREMITIES: No cyanosis, clubbing or edema.  NEURO: Alert and oriented x 3. Normal affect.Face is symmetric.  SKIN: Inspection of the skin reveals no rashes, ulcerations or petechiae.  PSYCH: euthymic              Labs:    Recent Results (from the past 240 hour(s))   Albumin   Result Value Ref Range    Albumin 2.7 (L) 3.5 - 5.0 g/dL   Ferritin   Result Value Ref Range    Ferritin 6 (L) 10 - 130 ng/mL    Prealbumin   Result Value Ref Range    Prealbumin 17.7 (L) 19.0 - 38.0 mg/dL   HM1 (CBC with Diff)   Result Value Ref Range    WBC 6.4 4.0 - 11.0 thou/uL    RBC 3.67 (L) 3.80 - 5.40 mill/uL    Hemoglobin 7.9 (L) 12.0 - 16.0 g/dL    Hematocrit 28.3 (L) 35.0 - 47.0 %    MCV 77 (L) 80 - 100 fL    MCH 21.5 (L) 27.0 - 34.0 pg    MCHC 27.9 (L) 32.0 - 36.0 g/dL    RDW 19.2 (H) 11.0 - 14.5 %    Platelets 585 (H) 140 - 440 thou/uL    MPV 9.4 8.5 - 12.5 fL    Neutrophils % 39 (L) 50 - 70 %    Lymphocytes % 50 (H) 20 - 40 %    Monocytes % 8 2 - 10 %    Eosinophils % 3 0 - 6 %    Basophils % 1 0 - 2 %    Neutrophils Absolute 2.5 2.0 - 7.7 thou/uL    Lymphocytes Absolute 3.2 0.8 - 4.4 thou/uL    Monocytes Absolute 0.5 0.0 - 0.9 thou/uL    Eosinophils Absolute 0.2 0.0 - 0.4 thou/uL    Basophils Absolute 0.1 0.0 - 0.2 thou/uL   Hemoglobin   Result Value Ref Range    Hemoglobin 7.9 (L) 12.0 - 16.0 g/dL   H. pylori Antigen, Stool   Result Value Ref Range    Specimen Description Feces     H pylori Antigen SEE NOTES     Report Status FINAL 02/22/2019    HM1 (CBC with Diff)   Result Value Ref Range    WBC 6.0 4.0 - 11.0 thou/uL    RBC 3.81 3.80 - 5.40 mill/uL    Hemoglobin 8.7 (L) 12.0 - 16.0 g/dL    Hematocrit 30.6 (L) 35.0 - 47.0 %    MCV 80 80 - 100 fL    MCH 22.8 (L) 27.0 - 34.0 pg    MCHC 28.4 (L) 32.0 - 36.0 g/dL    RDW 22.8 (H) 11.0 - 14.5 %    Platelets 412 140 - 440 thou/uL    MPV 9.4 8.5 - 12.5 fL    Neutrophils % 38 (L) 50 - 70 %    Lymphocytes % 50 (H) 20 - 40 %    Monocytes % 8 2 - 10 %    Eosinophils % 4 0 - 6 %    Basophils % 1 0 - 2 %    Neutrophils Absolute 2.3 2.0 - 7.7 thou/uL    Lymphocytes Absolute 3.0 0.8 - 4.4 thou/uL    Monocytes Absolute 0.5 0.0 - 0.9 thou/uL    Eosinophils Absolute 0.2 0.0 - 0.4 thou/uL    Basophils Absolute 0.1 0.0 - 0.2 thou/uL   Manual Differential   Result Value Ref Range    Platelet Estimate Normal Normal    Ovalocytes 1+ (!) Negative    Polychromasia 1+ (!) Negative          Assessment:  1. Hypercalcemia     2. Trigeminal neuralgia     3. Reflux gastritis     4. Epigastric pain     5. Moderate protein-calorie malnutrition (H)     6. Intractable migraine without aura and without status migrainosus     7. Dry eyes         MEDICAL EQUIPMENT NEEDS:  none      DISCHARGE PLAN/FACE TO FACE:  N/A    Electronically signed by: Deirdre Reyes CNP

## 2021-06-24 NOTE — PROGRESS NOTES
Code Status:  FULL CODE  Visit Type: H & P     Facility:  [facility] [[id number]]      Facility Type: SNF (Skilled Nursing Facility, TCU)    History of Present Illness:   Hospital Admission Date: February 6, 2019 [facility] Discharge Date: February 9, 2019  Facility Admission Date: February 9, 2019 [facility] transitional care unit    Julisa Chaney is a 73 y.o. female with chronic left-sided facial pain secondary to trigeminal neuralgia who is brought in by her daughter for worsening encephalopathy.  Her mental status had been declining for about a week.  She does take multiple over-the-counter Tums.    Hospital course the patient was found to have a severe hyperkalemia initially to 14.1 and workup revealed a normal PTH TSH normal and a vitamin D level low.  The Tums were discontinued and vitamin D was begun.  Her sedation improved every day but she was still weak.  Her nortriptyline was held.  The CT of the head showed no acute abnormalities.  Her vitamin B12 was normal.  They did replace the low K and the low magnesium.  They continued her on Zoloft but at a reduced dose.  She did have a moderate protein calorie malnutrition and anemia of chronic disease at the level of 9.    Our facility course to date dietary has added supplements ice cream and med Pass.  Deirdre started the nortriptyline back and she is felt much better with her trigeminal pain.  She is taking Tylenol 3 now every 6 hours as needed.  This is been very helpful.  In addition she was started on omeprazole and her gastric pain has significantly improved    Past Medical History:   Diagnosis Date     High cholesterol      Migraines      Trigeminal neuralgia      Past Surgical History:   Procedure Laterality Date     MO TOTAL ABDOM HYSTERECTOMY      Description: Total Abdominal Hysterectomy;  Recorded: 08/17/2010;     Family History   Problem Relation Age of Onset     Cancer Father      Testicular cancer  Grandchild 17     Breast cancer Mother      Breast cancer Sister      Breast cancer Maternal Aunt      Breast cancer Sister      Social History     Socioeconomic History     Marital status:      Spouse name: Not on file     Number of children: Not on file     Years of education: Not on file     Highest education level: Not on file   Social Needs     Financial resource strain: Not on file     Food insecurity - worry: Not on file     Food insecurity - inability: Not on file     Transportation needs - medical: Not on file     Transportation needs - non-medical: Not on file   Occupational History     Not on file   Tobacco Use     Smoking status: Former Smoker     Packs/day: 1.00     Years: 50.00     Pack years: 50.00     Last attempt to quit: 2014     Years since quittin.2     Smokeless tobacco: Never Used     Tobacco comment: stop smoking packet given   Substance and Sexual Activity     Alcohol use: No     Drug use: No     Sexual activity: Not on file   Other Topics Concern     Not on file   Social History Narrative    Lives alone in the house, 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    Mara Murillo MD 2018 1:49 PM       Additional Geriatric Review patient lives alone has a daughter that lives nearby her  passed away in .  Current Outpatient Medications   Medication Sig Dispense Refill     acetaminophen-codeine (TYLENOL #3) 300-30 mg per tablet TAKE 1 TABLET BY MOUTH THREE TIMES DAILY AS NEEDED FOR PAIN (Patient taking differently: TAKE 1 TABLET BY MOUTH EVERY 6 HOURS AS NEEDED FOR PAIN) 120 tablet 0     aspirin-acetaminophen-caffeine (EXCEDRIN MIGRAINE) 250-250-65 mg per tablet Take 1 tablet by mouth every 6 (six) hours as needed for pain.       cholecalciferol, vitamin D3, (VITAMIN D3) 2,000 unit Tab One tab daily (Patient taking differently: Take 2,000 Units by mouth daily. One tab daily      ) 90 tablet 3     hydrOXYzine HCl (ATARAX) 25  "MG tablet Take 25 mg by mouth at bedtime.       multivitamin (ONE A DAY) per tablet Take 1 tablet by mouth daily. 120 tablet 2     nortriptyline (PAMELOR) 10 MG capsule Take 50 mg by mouth at bedtime.       omeprazole (PRILOSEC) 20 MG capsule Take 20 mg by mouth daily before breakfast.       OXcarbazepine (TRILEPTAL) 150 MG tablet Take 3 tablets (450 mg total) by mouth at bedtime. 270 tablet 3     polymyxin B-trimethoprim (FOR POLYTRIM) 10,000 unit- 1 mg/mL Drop ophthalmic drops 1 drop to left eye 3 times daily for 5 days (Patient taking differently: 1 drop. 1 drop to both 3 times daily for 5 days      ) 1 Bottle 0     sertraline (ZOLOFT) 25 MG tablet Take 1 tablet (25 mg total) by mouth daily. 90 tablet 0     topiramate (TOPAMAX) 50 MG tablet Take 1 tablet (50 mg total) by mouth daily. 90 tablet 3     No current facility-administered medications for this visit.      Allergies   Allergen Reactions     Mirtazapine Other (See Comments)     \"Needles to the face\"     Penicillins Rash     Immunization History   Administered Date(s) Administered     DT (pediatric) 08/30/2000     Hep A, historic 04/07/2010     Influenza [id number], 01/15/2010     Influenza high dose, seasonal 10/29/2015, 09/08/2016, 10/15/2018     Influenza, Seasonal, Inj PF IIV3 10/09/2010, 09/30/2011, 11/13/2012, 11/21/2013     Influenza, inj, historic,unspecified 11/07/2007, 10/22/2008, 10/02/2009     Influenza,seasonal quad, PF, 36+MOS 10/10/2014     Pneumo Conj 13-V (2010&after) 10/29/2015     Pneumo Polysac 23-V 10/10/2014     Td, adult adsorbed, PF 05/10/2018     Td,adult,historic,unspecified 11/07/2007     Tdap 11/07/2007     ZOSTER, LIVE 11/07/2007         Review of Systems   Constitutional: Negative.  Negative for appetite change, chills, diaphoresis, fatigue, fever and unexpected weight change.   HENT: Negative for congestion, dental problem, ear pain, hearing loss, nosebleeds, sinus pressure, sore throat, tinnitus and trouble swallowing.    Eyes: " Negative for photophobia, pain, discharge, itching and visual disturbance.   Respiratory: Negative for apnea, cough, chest tightness, shortness of breath and wheezing.    Cardiovascular: Negative for chest pain and palpitations.   Gastrointestinal: Negative for abdominal distention, abdominal pain, constipation and diarrhea.   Endocrine: Negative for cold intolerance, heat intolerance and polydipsia.   Genitourinary: Negative.  Negative for decreased urine volume, difficulty urinating, dysuria, enuresis, frequency, hematuria, menstrual problem, urgency and vaginal discharge.   Musculoskeletal: Negative for arthralgias, back pain and gait problem.   Allergic/Immunologic: Negative.    Neurological: Negative for dizziness, tremors, syncope, facial asymmetry, speech difficulty, weakness, light-headedness, numbness and headaches.   Hematological: Negative.    Psychiatric/Behavioral: Negative for agitation, behavioral problems, confusion, decreased concentration, dysphoric mood and hallucinations. The patient is not hyperactive.         Physical Exam   Constitutional: She is oriented to person, place, and time. She appears well-developed and well-nourished.   Cachectic in appearance with a BMI of only 14.85   HENT:   Head: Normocephalic and atraumatic.   Right Ear: External ear normal.   Left Ear: External ear normal.   Nose: Nose normal.   Mouth/Throat: Oropharynx is clear and moist.   Eyes: Conjunctivae and EOM are normal. Pupils are equal, round, and reactive to light. Left eye exhibits no discharge. No scleral icterus.   Bilateral yellowish discharge on the lids.  More prominent on the left than on the right.   Neck: Neck supple. No JVD present. No tracheal deviation present. No thyromegaly present.   Cardiovascular: Normal rate, regular rhythm and normal heart sounds. Exam reveals no friction rub.   No murmur heard.  Pulmonary/Chest: Effort normal and breath sounds normal. No respiratory distress. She has no wheezes.  She has no rales. She exhibits no tenderness.   Abdominal: She exhibits no distension and no mass. There is no tenderness. There is no guarding.   Musculoskeletal: Normal range of motion. She exhibits no edema or tenderness.   Lymphadenopathy:     She has no cervical adenopathy.   Neurological: She is alert and oriented to person, place, and time. She has normal reflexes. No cranial nerve deficit. She exhibits normal muscle tone. Coordination normal.   Skin: Skin is warm and dry. No rash noted. No erythema.   Psychiatric: She has a normal mood and affect. Her behavior is normal. Thought content normal.   Nursing note and vitals reviewed.        Labs:  All labs reviewed in the nursing home record.  Recent Results (from the past 168 hour(s))   Basic Metabolic Panel   Result Value Ref Range    Sodium 140 136 - 145 mmol/L    Potassium 3.8 3.5 - 5.0 mmol/L    Chloride 105 98 - 107 mmol/L    CO2 28 22 - 31 mmol/L    Anion Gap, Calculation 7 5 - 18 mmol/L    Glucose 98 70 - 125 mg/dL    Calcium 9.9 8.5 - 10.5 mg/dL    BUN 9 8 - 28 mg/dL    Creatinine 0.67 0.60 - 1.10 mg/dL    GFR MDRD Af Amer >60 >60 mL/min/1.73m2    GFR MDRD Non Af Amer >60 >60 mL/min/1.73m2   Magnesium   Result Value Ref Range    Magnesium 2.2 1.8 - 2.6 mg/dL   Calcium   Result Value Ref Range    Calcium 9.4 8.5 - 10.5 mg/dL   HM1 (CBC with Diff)   Result Value Ref Range    WBC 6.6 4.0 - 11.0 thou/uL    RBC 4.61 3.80 - 5.40 mill/uL    Hemoglobin 9.9 (L) 12.0 - 16.0 g/dL    Hematocrit 36.0 35.0 - 47.0 %    MCV 78 (L) 80 - 100 fL    MCH 21.5 (L) 27.0 - 34.0 pg    MCHC 27.5 (L) 32.0 - 36.0 g/dL    RDW 19.0 (H) 11.0 - 14.5 %    Platelets 595 (H) 140 - 440 thou/uL    MPV 11.0 8.5 - 12.5 fL    Neutrophils % 47 (L) 50 - 70 %    Lymphocytes % 42 (H) 20 - 40 %    Monocytes % 7 2 - 10 %    Eosinophils % 3 0 - 6 %    Basophils % 1 0 - 2 %    Neutrophils Absolute 3.1 2.0 - 7.7 thou/uL    Lymphocytes Absolute 2.8 0.8 - 4.4 thou/uL    Monocytes Absolute 0.5 0.0 - 0.9  thou/uL    Eosinophils Absolute 0.2 0.0 - 0.4 thou/uL    Basophils Absolute 0.1 0.0 - 0.2 thou/uL     Assessment:  1. Hypercalcemia     2. Trigeminal neuralgia     3. Encephalopathy     4. Weight loss     5. Reflux gastritis     6. Epigastric pain     7. Other constipation     8. Anemia, unspecified type     9. Moderate protein-calorie malnutrition (H)         Plan: At this juncture she is begun now to have some crusty discharge in her eyes after completion of her antibiotic therapy for her eyes.  She apparently has a chronic problems with tear ducts.  Because of this we will renew the polymyxin B trimethoprim.  1 drop OU 3 times daily till 1 Day Pass clear.  After that would give consideration of placing just a refresher LiquiTears to keep those ducts clean.  In regards to her low weight we will do 2 times a week weights.  On Monday we will get a serum iron ferritin pre-albumin and albumin for the anemia which is microcytic at this juncture.    Total 45 minutes of which 65% was spent in counseling and coordination of care of the above plan.    Electronically signed by: Ed Jones MD

## 2021-06-24 NOTE — PATIENT INSTRUCTIONS - HE
Dear Julisa,    I am glad you are feeling better    Goal is to get enough nutrition, please talk to the pharmacist if they can suggest a better brand for you which you would like I am unable to find the name of the protein drink they were giving you at the hospital  We are checking your calcium, hemoglobin, also protein level just to see how you recovering, will let you know about your result as soon as I can  Increase the dose of his Zoloft to  50 mg so you can finish taking current prescription of 25 mg by  taking 2 tablets every day for you fill the new prescription for 50 mg  He came who is her care coordinator also will talk to you before you leave from the clinic today  The medication no change  We will refill your Tylenol No. 3 as needed  To new walking at home rebuild the stamina.  Eat more frequently small meals throughout the day drink lots of liquids  Try to avoid Tums or any calcium supplement for now  Restart your vitamin D and Prilosec which you already have at home  Follow-up in 3 months    Mara Murillo MD 3/5/2019 12:00 PM

## 2021-06-25 NOTE — TELEPHONE ENCOUNTER
Care Guide was speaking with the patient for routine outreach on goals and action steps with Clinic Care Coordination.    On 3/5/19, Dr. Murillo informed the patient to increase her Sertraline from 25 mg daily to 50 mg daily.  The patient states that since she has made the increase, she has been noticing muscle jerking.  Today, she only took 25 mg and states she has not noticed the symptom.    Dr. Butler, please advise    Thank You,  Dora

## 2021-06-25 NOTE — TELEPHONE ENCOUNTER
Care Guide called the patient back to inform her of what Dr. Butler wrote.    The patient stated she had made a mistake.  Her daughter visited and reviewed her medications.  They realized she was taking the new 50 mg Sertraline along with a 25 mg Sertraline.    Now she is just taking the 50 mg Sertraline and is not having any of the muscle jerking symptoms.  She has no further questions or concerns at this time.    ELIZABETH

## 2021-06-25 NOTE — TELEPHONE ENCOUNTER
During Care Guide outreach today, the patient informed me her daughter, Alissa, is managing her medications.  The patient gave permission for me to contact her daughter to verify she doesn't have any questions in regard to the medications and inform her the CCC RN is available if she ever has a question.    Called: Alissa  Phone: [phone]  TCB #1

## 2021-06-25 NOTE — TELEPHONE ENCOUNTER
Alissa returned my call.    I introduced myself and explained the patient's participation with Clinic Care Coordination.  Informed her the patient told me she is now managing the medications.    Alissa stated:    They have purchased pill boxes and marked them AM, Afternoon and PM.    She is filling the pill boxes and providing them to the patient.    The patient has been happy with this and it has been going well.    Explained I was reviewing Dr. Murillo's recommendations from 3/5/19 and she had stated to restart Vitamin D and Prilosec.  The patient couldn't tell me if she was taking the Prilosec.    Alissa stated the patient is taking the Vitamin D and Prilosec.  The name, Prilosec, is not what is listed on the bottle.    I informed her that she can contact me with any questions or concerns in regard to medications and I can assist in coordinating a time to meet/talk with our RN.  She acknowledged understanding and stated she does not have any questions at this time.    She mentioned the patient has been getting $900.00 bills from her medical appointments lately.  She isn't sure why this is.  We discussed that her Medicare Supplement changed for 2019; however, I cannot see the specific details of the new supplement.    Alissa confirmed there is a deductible; however, she doesn't know what the amount is.  She will do some further research, contact the billing departments and insure all the medical insurance information is on file, talk with the patient etc.  She or the patient will contact me if they would like to coordinate an appointment with the  to discuss further.

## 2021-06-26 ENCOUNTER — HEALTH MAINTENANCE LETTER (OUTPATIENT)
Age: 76
End: 2021-06-26

## 2021-06-26 NOTE — TELEPHONE ENCOUNTER
RN cannot approve Refill Request    RN can NOT refill this medication med is not covered by policy/route to provider. Last office visit: 1/6/2020 Mara Murillo MD Last Physical: 5/4/2021 Last MTM visit: Visit date not found Last visit same specialty: 8/21/2020 Ivan Driver MD.  Next visit within 3 mo: Visit date not found  Next physical within 3 mo: Visit date not found      Marcelina Anthony, [facility] Triage/Med Refill 6/17/2021    Requested Prescriptions   Pending Prescriptions Disp Refills     QUEtiapine (SEROQUEL) 25 MG tablet [Pharmacy Med Name: QUETIAPINE 25MG TABLETS] 30 tablet 0     Sig: TAKE 1 TABLET(25 MG) BY MOUTH AT BEDTIME       There is no refill protocol information for this order

## 2021-06-26 NOTE — PROGRESS NOTES
Progress Notes by Mara Murillo MD at 12/4/2018 11:00 AM     Author: Mara Murillo MD Service: -- Author Type: Physician    Filed: 12/4/2018  1:18 PM Encounter Date: 12/4/2018 Status: Signed    : Mara Murillo MD (Physician)       Assessment/plan   Julisa Chaney is a 73 y.o. female who is  establish patient to my practice here with   Chief Complaint   Patient presents with   ? Med Check     discuss Tylenol #3 with other meds         Julisa was seen today for med check.    Diagnoses and all orders for this visit:    Mild episode of recurrent major depressive disorder (H)  Comments:  feels nothing make her happy any more and no motivation to do anything for [place]   Orders:  -     sertraline (ZOLOFT) 25 MG tablet; Take 1 tablet (25 mg total) by mouth daily.    Controlled substance agreement signed    Trigeminal neuralgia    Intractable migraine without aura and without status migrainosus    Patient unable to tolerate Remeron stopped taking couple weeks ago willing to start Zoloft today started 12.5 mg dose for 1 week then increase to 25 mg after that if no side effect  Agreed to refill her Tylenol # 3 as needed    Patient talked with our care coordinator Dora  also today reach some  goals please see seperated notes from them  Plan also  have her see llong-term care planning nurse   Follow-up 3 months  Subjective:      HPI: Julisa Chaney is a 73 y.o. female is here for.    Trigeminal Neuralgia : stable on current treatment plan , taking topamax on once 50 mg daily . Also adv on cut down the dose of tylenol #3    Underweight : added vit D and MVI she had recent ECHO which was normal and chest CT which showed mild emphysema and lung mass which resolve on Follow up most likely lobar pneumonia   Does not need inhaler any more     Depression: Patient complains of depression. She complains as in PHQ. Onset was approximately several months ago, gradually worsening since that time.  She denies current suicidal and  homicidal plan or intent.   Family history significant for no psychiatric illness.Possible organic causes contributing are: endocrine/metabolic, nutritional.  Risk factors: previous episode of depression Previous treatment includes none      PHQ score = 9    YUE-7 Screening Results:  No Data Recorded    Past Medical History:   Diagnosis Date   ? High cholesterol    ? Migraines    ? Trigeminal neuralgia      Past Surgical History:   Procedure Laterality Date   ? AL TOTAL ABDOM HYSTERECTOMY      Description: Total Abdominal Hysterectomy;  Recorded: 08/17/2010;     Mirtazapine and Penicillins  Current Outpatient Medications   Medication Sig Dispense Refill   ? acetaminophen-codeine (TYLENOL #3) 300-30 mg per tablet Take 1 tablet by mouth 3 (three) times a day as needed for pain. 120 tablet 0   ? aspirin-acetaminophen-caffeine (EXCEDRIN MIGRAINE) 250-250-65 mg per tablet Take 1 tablet by mouth every 6 (six) hours as needed for pain.     ? cholecalciferol, vitamin D3, (VITAMIN D3) 2,000 unit Tab One tab daily 90 tablet 3   ? multivitamin (ONE A DAY) per tablet Take 1 tablet by mouth daily. 120 tablet 2   ? nortriptyline (PAMELOR) 25 MG capsule TAKE 4 CAPSULES (100 MG    TOTAL) AT BEDTIME 360 capsule 2   ? OXcarbazepine (TRILEPTAL) 150 MG tablet Take 3 tablets (450 mg total) by mouth at bedtime. 270 tablet 3   ? topiramate (TOPAMAX) 50 MG tablet Take 1 tablet (50 mg total) by mouth daily. 180 tablet 4   ? ferrous gluconate (FERGON) 324 MG tablet Take 1 tablet (324 mg total) by mouth 2 (two) times a day. 180 tablet 3   ? sertraline (ZOLOFT) 25 MG tablet Take 1 tablet (25 mg total) by mouth daily. 30 tablet 2     No current facility-administered medications for this visit.      Family History   Problem Relation Age of Onset   ? Cancer Father    ? Testicular cancer Grandchild 17   ? Breast cancer Mother    ? Breast cancer Sister    ? Breast cancer Maternal Aunt    ? Breast cancer Sister        Patient Active Problem List    Diagnosis   ? Osteopenia   ? Hyperlipidemia   ? Migraine Headache   ? Trigeminal neuralgia   ? Underweight   ? Protein-calorie malnutrition, severe (H)   ? Counseling regarding advanced directives and goals of care   ? Controlled substance agreement signed       Review of Systems   12 point comprehensive review of systems was negative except as noted and HPI     Social History     Social History Narrative    Lives alone in the house, relay in TV dinner    grandkids help with house maintenance    Daughter lives close by.    Mara Murillo MD 7/9/2018 1:49 PM         Objective:     Vitals:    12/04/18 1109   BP: 110/58   Pulse: 88   Weight: 100 lb 11.2 oz (45.7 kg)       Physical Exam:     General: Alert, no acute distress.   HEENT: normocephalic conjunctivae are clear, Normal pearly TMs bilaterally without erythema, pus or fluid. Position and landmarks are normal.  Nose is clear.  Oropharynx is moist and clear, without tonsillar hypertrophy, asymmetry, exudate or lesions.  Neck: supple without adenopathy or thyromegaly.  Lungs: Good aeration bilaterally. No prolongation of expiratory phase.   No tachypnea, retractions, or increased work of breathing. Clear to auscultation without wheezes, rales or rhonci.    Heart: regular rate and rhythm, normal S1 and S2, no murmurs  Abdomen: soft and nontender, bowel sounds are present, no hepatosplenomegaly or mass palpable.  Skin: clear without rash or lesions  Neuro: alert, interactive moving all extremities equally, normal muscle tone in all 4 extremities, deep tendon reflexes 2+ symmetrically at the patella    Mara Murillo MD    Patient Instructions     As depression is getting worse we agree today to try zoloft 25 mg daily ( start 1/2 pill for one wk)   And think about counseling   Follow up 3 month.    Mara Murillo MD 12/4/2018 11:24 AM    Patient Education     Depression Affects Your Mind and Body  Everyone feels sad or blue from time to time for a few days or  weeks. Depression is when these feelings don't go away and they interfere with daily life.  Depression is a real illness that can develop at any age. It is one of the most common mental health problems in the [place] Depression makes you feel sad, helpless, and hopeless. It gets in the way of your life and relationships. It inhibits your ability to think and act. But, with help, you can feel better again.     When I was depressed, I felt awful. I was so tired all the time I could hardly think, but at night I couldnt fall asleep. My head hurt. My stomach hurt. I didnt know what was wrong with me.   Depression affects your whole body  Brain chemicals affect your body as well as your mood. So depression may do more than just make you feel low. You may also feel bad physically. Depression can:    Cause trouble with mental tasks such as remembering, concentrating, or making decisions    Make you feel nervous and jumpy    Cause trouble sleeping. Or you may sleep too much    Change your appetite    Cause headaches, stomachaches, or other aches and pains    Drain your body of energy  Depression and other illness  It is common for people who have chronic health problems to also have depression. It can often be hard to tell which one caused the other. A person might become depressed after finding out they have a health problem. But some studies suggest being depressed may make certain health problems more likely. And some depressed people stop taking care of themselves. This may make them more likely to get sick.  Date Last Reviewed: 1/1/2017 2000-2017 [employer]. [address] [place] [address], [place] [address]. All rights reserved. This information is not intended as a substitute for professional medical care. Always follow your healthcare professional's instructions.           Patient Education     Counseling for Depression  For some people, counseling, also called talk therapy, has been found to be as effective as  medicine for mild to moderate depression. When done by a trained professional, this treatment is a powerful way to better understand your thoughts and feelings. Like medicine, it may take time before you notice how much counseling is helping.    Kinds of talk therapy  Different counselors use different methods for talk therapy. But all therapy aims to help change how you think about your problem. Most therapy for depression is often done one-on-one. But it may also be done in a group setting. You and your healthcare provider can discuss the type of therapy you think would work best for you. You can also discuss who the best person is to provide the therapy.  How therapy helps  Talking about your problems can help them seem less overwhelming. It can help work through problems you have with your life and your relationships. It can also help you understand how depression is clouding your thinking, not letting you see the world the way it really is. Therapy can give you:    Insight about your emotions    New tools for dealing with your problems    Emotional support for making progress  Getting better takes time  Talk therapy can help you feel better. But change doesnt happen right away. Depression takes away your energy and motivation. So it can be hard to feel like going to therapy and sticking with it. But therapy has been proven to be very valuable in the treatment of depression. Therapy for depression is often done for a set number of sessions. In other cases, you and your therapist decide together at what point you no longer need therapy.  Additional sources of help  In addition to a professional counselor, it may help to talk to other people in your life. You may find support and insight from:    A close friend or family member    A  trained in counseling    A local support group or community group    A 12-step program (such as Alcoholics Anonymous) for dealing with problems that can contribute to  depression, such as alcohol or drug addiction  Date Last Reviewed: 1/1/2017 2000-2017 [employer]. [address] [place] [address], [place] [address]. All rights reserved. This information is not intended as a substitute for professional medical care. Always follow your healthcare professional's instructions.

## 2021-06-26 NOTE — TELEPHONE ENCOUNTER
Refill request for medication: omeprazole (PRILOSEC) 40 MG capsule  Last refill on 1/4/21, quantity #90     Refill request for medication: acetaminophen-codeine (TYLENOL #3) 300-30 mg per tablet  Last visit addressing this medication: 5/4/21  Follow up plan 12  months  Last refill on 5/26/21, quantity #120   CSA completed not on file  Date PDMP was last reviewed never reviewed    Appointment: Not due   Appointment recommended at least every 3 months for opioid prescriptions. Appointment recommended at least every 6 months for ADHD medications.    Yue Shin MA

## 2021-07-03 NOTE — ANESTHESIA PREPROCEDURE EVALUATION
Anesthesia Preprocedure Evaluation by Josh Elizabeth MD at 8/14/2020  1:35 PM     Author: Josh Elizabeth MD Service: -- Author Type: Physician    Filed: 8/14/2020  1:47 PM Date of Service: 8/14/2020  1:35 PM Status: Addendum    : Josh Elizabeth MD (Physician)    Related Notes: Original Note by Josh Elizabeth MD (Physician) filed at 8/14/2020  1:36 PM       Anesthesia Evaluation      Patient summary reviewed   No history of anesthetic complications     Airway   Mallampati: II  Neck ROM: full   Pulmonary - normal exam   (+) a smoker (Former)                         Cardiovascular - normal exam  Exercise tolerance: > or = 4 METS  (+) , hypercholesterolemia,     ECG reviewed        Neuro/Psych    (+) neuromuscular disease,  anxiety/panic attacks, chronic pain    Endo/Other       Comments: Iron deficiency anemia due to chronic blood loss     GI/Hepatic/Renal    (+) GERD well controlled and intermittent,        Other findings: Echo 8/10/20   Summary     1. Normal left ventricular size and systolic performance with a visually estimated ejection fraction of 60-65%.   2. There is mild aortic insufficiency.   3. Normal right ventricular size and systolic performance.  4. No obvious valvular vegetations identified on this study.  5. Right ventricular systolic pressure relative to right atrial pressure is moderately increased. The pulmonary artery pressure is estimated to be 50 mmHg plus right atrial pressure (the IVC is of fairly normal caliber).           Dental    (+) caps                           Anesthesia Plan  Planned anesthetic: MAC    ASA 2     Anesthetic plan and risks discussed with: patient  Anesthesia plan special considerations: antiemetics,   Post-op plan: routine recovery

## 2021-07-03 NOTE — ADDENDUM NOTE
Addendum Note by Zahra Roth MD at 1/10/2017  3:34 PM     Author: Zahra Roth MD Service: -- Author Type: Physician    Filed: 1/10/2017  3:34 PM Encounter Date: 1/10/2017 Status: Signed    : Zahra Roth MD (Physician)    Addended by: ZAHRA ROTH on: 1/10/2017 03:34 PM        Modules accepted: Orders

## 2021-07-03 NOTE — ADDENDUM NOTE
Addendum Note by Cade Ibarra CMA at 1/10/2017  4:08 PM     Author: Cade Ibarra CMA Service: -- Author Type: Certified Medical Assistant    Filed: 1/10/2017  4:08 PM Encounter Date: 1/10/2017 Status: Signed    : Cade Ibarra CMA (Certified Medical Assistant)    Addended by: CADE IBARRA on: 1/10/2017 04:08 PM        Modules accepted: Orders

## 2021-07-03 NOTE — ADDENDUM NOTE
Addendum Note by Cade Ibarra CMA at 1/10/2017  4:06 PM     Author: Cade Ibarra CMA Service: -- Author Type: Certified Medical Assistant    Filed: 1/10/2017  4:06 PM Encounter Date: 1/10/2017 Status: Signed    : Cade Ibarra CMA (Certified Medical Assistant)    Addended by: CADE IBARRA on: 1/10/2017 04:06 PM        Modules accepted: Orders

## 2021-07-03 NOTE — ADDENDUM NOTE
Addendum Note by Mara Murillo MD at 7/29/2020 11:41 AM     Author: Mara Murillo MD Service: -- Author Type: Physician    Filed: 7/29/2020 11:41 AM Encounter Date: 7/28/2020 Status: Signed    : Mara Murillo MD (Physician)    Addended by: MARA MURILLO on: 7/29/2020 11:41 AM        Modules accepted: Orders

## 2021-07-13 ENCOUNTER — RECORDS - HEALTHEAST (OUTPATIENT)
Dept: ADMINISTRATIVE | Facility: CLINIC | Age: 76
End: 2021-07-13

## 2021-07-19 DIAGNOSIS — G50.0 TRIGEMINAL NEURALGIA: Primary | ICD-10-CM

## 2021-07-20 NOTE — TELEPHONE ENCOUNTER
Refill request for medication: acetaminophen-codeine (TYLENOL #3) 300-30 mg per tablet  Last visit addressing this medication: 5/04/2021  Follow up plan 12  months  Last refill on 6/22/2021, quantity 120   CSA completed not on file  Date PDMP was last reviewed never reviewed    Appointment: Not due   Appointment recommended at least every 3 months for opioid prescriptions. Appointment recommended at least every 6 months for ADHD medications.    @[facility]@

## 2021-07-31 DIAGNOSIS — G43.719 INTRACTABLE CHRONIC MIGRAINE WITHOUT AURA AND WITHOUT STATUS MIGRAINOSUS: ICD-10-CM

## 2021-08-03 PROBLEM — A41.9 SEPSIS (H): Status: RESOLVED | Noted: 2020-08-10 | Resolved: 2020-08-21

## 2021-08-04 RX ORDER — TOPIRAMATE 50 MG/1
TABLET, FILM COATED ORAL
Qty: 90 TABLET | Refills: 2 | Status: SHIPPED | OUTPATIENT
Start: 2021-08-04 | End: 2022-04-28

## 2021-08-04 NOTE — TELEPHONE ENCOUNTER
"Routing refill request to provider for review/approval because:  Labs out of range:  HGB    Last Written Prescription Date:  11/17/2020  Last Fill Quantity: 90,  # refills: 2   Last office visit provider:  5/4/21    Requested Prescriptions   Pending Prescriptions Disp Refills     topiramate (TOPAMAX) 50 MG tablet [Pharmacy Med Name: TOPIRAMATE 50MG TABLETS] 90 tablet 2     Sig: TAKE 1 TABLET BY MOUTH EVERY DAY       Anti-Seizure Meds Protocol  Failed - 7/31/2021  6:00 AM        Failed - Review Authorizing provider's last note.      Refer to last progress notes: confirm request is for original authorizing provider (cannot be through other providers).          Failed - Normal CBC on file in past 26 months     Recent Labs   Lab Test 05/04/21  1205   WBC 6.8   RBC 4.67   HGB 11.1*   HCT 37.7                    Passed - Recent (12 mo) or future (30 days) visit within the authorizing provider's specialty     Patient has had an office visit with the authorizing provider or a provider within the authorizing providers department within the previous 12 mos or has a future within next 30 days. See \"Patient Info\" tab in inbasket, or \"Choose Columns\" in Meds & Orders section of the refill encounter.              Passed - Normal serum creatinine on file in past 26 months     Recent Labs   Lab Test 05/04/21  1205   CR 0.68       Ok to refill medication if creatinine is low          Passed - Normal ALT or AST on file in past 26 months     Recent Labs   Lab Test 05/04/21  1205   ALT 18     Recent Labs   Lab Test 05/04/21  1205   AST 16             Passed - Normal platelet count on file in past 26 months     Recent Labs   Lab Test 05/04/21  1205                  Passed - Medication is active on med list        Passed - No active pregnancy on record        Passed - No positive pregnancy test in last 12 months             Claudia Mcdermott RN 08/04/21 10:54 AM  "

## 2021-08-16 DIAGNOSIS — H04.123 DRY EYES: ICD-10-CM

## 2021-08-16 DIAGNOSIS — G50.0 TRIGEMINAL NEURALGIA: ICD-10-CM

## 2021-08-17 RX ORDER — QUETIAPINE FUMARATE 25 MG/1
TABLET, FILM COATED ORAL
Qty: 90 TABLET | Refills: 4 | Status: SHIPPED | OUTPATIENT
Start: 2021-08-17 | End: 2022-02-07

## 2021-08-17 RX ORDER — POLYVINYL ALCOHOL 14 MG/ML
SOLUTION/ DROPS OPHTHALMIC
Qty: 15 ML | Refills: 0 | Status: SHIPPED | OUTPATIENT
Start: 2021-08-17 | End: 2022-05-26

## 2021-08-17 NOTE — TELEPHONE ENCOUNTER
Refill request for medication: tylenol#3, liquifilm ophthalmic solution   Last visit addressing this medication: 5/4/21  Follow up plan 3  months  Last refill on 7/20/21, quantity 120   CSA completed 7/9/2018  Date PDMP was last reviewed never reviewed     Appointment: Patient will call back to schedule appointment   Appointment recommended at least every 3 months for opioid prescriptions. Appointment recommended at least every 6 months for ADHD medications.    @[facility]@

## 2021-08-17 NOTE — TELEPHONE ENCOUNTER
Patient called to check on the status of her refills. I advised patient that we received the refill requests yesterday 08/16/2021. I advised patient that the turn around time is 24-72 hours. Patient stated she really needs the medications. I advised patient that I would send a message to her PCP.     Yani Agarwal

## 2021-09-13 DIAGNOSIS — G50.0 TRIGEMINAL NEURALGIA: ICD-10-CM

## 2021-09-13 NOTE — TELEPHONE ENCOUNTER
Routing refill request to provider for review/approval because:  Controlled substance request    Last Written Prescription Date:  8/17/21  Last Fill Quantity: 120,  # refills: 0   Last office visit provider:  5/4/21     Requested Prescriptions   Pending Prescriptions Disp Refills     acetaminophen-codeine (TYLENOL #3) 300-30 MG tablet [Pharmacy Med Name: ACETAMINOPHEN/COD #3 (300/30MG) TAB] 120 tablet      Sig: TAKE 1 TABLET BY MOUTH EVERY 6 HOURS AS NEEDED FOR PAIN       There is no refill protocol information for this order          Leonardo Colbert RN 09/13/21 2:18 PM

## 2021-09-14 NOTE — TELEPHONE ENCOUNTER
.Reason for Call:  Medication or medication refill:    Do you use a [facility]?  Name of the pharmacy and phone number for the current request:  [id number] - [place], [place] - [address] [facility] [address]  [phone]    Name of the medication requested: acetaminophen-codeine (TYLENOL #3) 300-30 MG tablet    Other request: NONE    Can we leave a detailed message on this number? YES    Phone number patient can be reached at: Home number on file [phone] (home)    Best Time: ANY    Call taken on 9/14/2021 at 12:20 PM by MARLY Polanco

## 2021-10-05 DIAGNOSIS — G51.8 FACIAL NEURALGIA: ICD-10-CM

## 2021-10-05 NOTE — TELEPHONE ENCOUNTER
Routing refill request to provider for review/approval because:  Drug not on the [facility] refill protocol     Last Written Prescription Date:  1/4/21  Last Fill Quantity: 270,  # refills: 2   Last office visit provider:  5/4/21     Requested Prescriptions   Pending Prescriptions Disp Refills     OXcarbazepine (TRILEPTAL) 150 MG tablet [Pharmacy Med Name: OXCARBAZEPINE 150MG TABLETS] 270 tablet 2     Sig: TAKE 3 TABLETS(450 MG) BY MOUTH AT BEDTIME       There is no refill protocol information for this order          Adolfo Duffy RN 10/05/21 4:03 PM

## 2021-10-06 RX ORDER — OXCARBAZEPINE 150 MG/1
TABLET, FILM COATED ORAL
Qty: 270 TABLET | Refills: 2 | Status: SHIPPED | OUTPATIENT
Start: 2021-10-06 | End: 2022-07-19

## 2021-10-11 DIAGNOSIS — G50.0 TRIGEMINAL NEURALGIA: ICD-10-CM

## 2021-10-12 NOTE — TELEPHONE ENCOUNTER
Routing refill request to provider for review/approval because:  Drug not on the [facility] refill protocol - controlled medication    Last Written Prescription Date:  9/14/2021  Last Fill Quantity: 120,  # refills: 0   Last office visit provider:  5/4/2021     Requested Prescriptions   Pending Prescriptions Disp Refills     acetaminophen-codeine (TYLENOL #3) 300-30 MG tablet [Pharmacy Med Name: ACETAMINOPHEN/COD #3 (300/30MG) TAB] 120 tablet      Sig: TAKE 1 TABLET BY MOUTH EVERY 6 HOURS AS NEEDED FOR PAIN       There is no refill protocol information for this order          Cassie Biggs RN 10/11/21 11:20 PM

## 2021-10-16 ENCOUNTER — HEALTH MAINTENANCE LETTER (OUTPATIENT)
Age: 76
End: 2021-10-16

## 2021-11-08 DIAGNOSIS — G50.0 TRIGEMINAL NEURALGIA: ICD-10-CM

## 2021-11-09 NOTE — TELEPHONE ENCOUNTER
Refill request for medication: acetaminophen-codeine (TYLENOL #3) 300-30 MG tablet  Last visit addressing this medication: 5/04/2021  Follow up plan not noted  Last refill on 10/13/2021, quantity 120   CSA completed not on file  Date PDMP was last reviewed never reviewed    Appointment: Not due   Appointment recommended at least every 3 months for opioid prescriptions. Appointment recommended at least every 6 months for ADHD medications.    Clementine Lang

## 2021-12-02 DIAGNOSIS — K21.9 GASTROESOPHAGEAL REFLUX DISEASE WITHOUT ESOPHAGITIS: Primary | ICD-10-CM

## 2021-12-02 DIAGNOSIS — F33.1 MODERATE EPISODE OF RECURRENT MAJOR DEPRESSIVE DISORDER (H): ICD-10-CM

## 2021-12-05 RX ORDER — NORTRIPTYLINE HCL 25 MG
CAPSULE ORAL
Qty: 360 CAPSULE | Refills: 1 | Status: SHIPPED | OUTPATIENT
Start: 2021-12-05 | End: 2022-02-17

## 2021-12-05 RX ORDER — OMEPRAZOLE 40 MG/1
CAPSULE, DELAYED RELEASE ORAL
Qty: 90 CAPSULE | Refills: 1 | Status: SHIPPED | OUTPATIENT
Start: 2021-12-05 | End: 2022-05-25

## 2021-12-05 NOTE — TELEPHONE ENCOUNTER
"Last Written Prescription Date:  10/23/20  Last Fill Quantity: 360,  # refills: 3   Last office visit provider:  5/4/21    Last Written Prescription Date:  6/22/21  Last Fill Quantity: 90,  # refills: 1   Last office visit provider:  5/4/21     Requested Prescriptions   Pending Prescriptions Disp Refills     nortriptyline (PAMELOR) 25 MG capsule [Pharmacy Med Name: NORTRIPTYLINE 25MG CAPSULES] 360 capsule 3     Sig: TAKE 2 CAPSULES BY MOUTH TWICE DAILY       Tricyclic Agents ( Annual appt and no PHQ9) Passed - 12/2/2021 10:36 AM        Passed - Blood Pressure under 140/90 in past 12 mos     BP Readings from Last 3 Encounters:   05/04/21 116/62   08/21/20 (!) 84/54   01/06/20 110/72                 Passed - Recent (12 mo) or future (30 days) visit within authorizing provider's specialty     Patient has had an office visit with the authorizing provider or a provider within the authorizing providers department within the previous 12 mos or has a future within next 30 days. See \"Patient Info\" tab in inbasket, or \"Choose Columns\" in Meds & Orders section of the refill encounter.              Passed - Medication is active on med list        Passed - Patient is age 18 or older        Passed - Patient is not pregnant        Passed - No positive pregnancy test on record in past 12 mos           omeprazole (PRILOSEC) 40 MG DR capsule [Pharmacy Med Name: OMEPRAZOLE 40MG CAPSULES] 90 capsule      Sig: TAKE 1 CAPSULE(40 MG) BY MOUTH DAILY BEFORE BREAKFAST       PPI Protocol Passed - 12/2/2021 10:36 AM        Passed - Not on Clopidogrel (unless Pantoprazole ordered)        Passed - No diagnosis of osteoporosis on record        Passed - Recent (12 mo) or future (30 days) visit within the authorizing provider's specialty     Patient has had an office visit with the authorizing provider or a provider within the authorizing providers department within the previous 12 mos or has a future within next 30 days. See \"Patient Info\" tab in " "inbasket, or \"Choose Columns\" in Meds & Orders section of the refill encounter.              Passed - Medication is active on med list        Passed - Patient is age 18 or older        Passed - No active pregnacy on record        Passed - No positive pregnancy test in past 12 months             Leonardo Colbert RN 12/05/21 8:12 AM  "

## 2021-12-06 DIAGNOSIS — G50.0 TRIGEMINAL NEURALGIA: ICD-10-CM

## 2021-12-07 NOTE — TELEPHONE ENCOUNTER
Refill request for medication: acetaminophen-codeine (TYLENOL #3) 300-30 MG tablet  Last visit addressing this medication: 5/04/2021  Follow up plan not noted    Last refill on 11/09/2021, quantity 120   CSA completed not on file  Date PDMP was last reviewed never reviewed  Appointment: Not due       Clementine Lang       
show

## 2022-01-02 DIAGNOSIS — G50.0 TRIGEMINAL NEURALGIA: ICD-10-CM

## 2022-01-31 DIAGNOSIS — G50.0 TRIGEMINAL NEURALGIA: ICD-10-CM

## 2022-02-01 DIAGNOSIS — G50.0 TRIGEMINAL NEURALGIA: ICD-10-CM

## 2022-02-01 DIAGNOSIS — G50.9 TRIGEMINAL NERVE DISORDER: ICD-10-CM

## 2022-02-01 RX ORDER — ACETAMINOPHEN AND CODEINE PHOSPHATE 300; 30 MG/1; MG/1
1 TABLET ORAL EVERY 6 HOURS PRN
Qty: 120 TABLET | Refills: 0 | Status: CANCELLED | OUTPATIENT
Start: 2022-02-01

## 2022-02-01 NOTE — TELEPHONE ENCOUNTER
Refill request for medication: acetaminophen-codeine (TYLENOL #3) 300-30 MG tablet  Last visit addressing this medication: 5/04/2021  Follow up plan not noted    Last refill on 1/04/2022, quantity 120   CSA completed not on file  Date PDMP was last reviewed never reviewed    Appointment: Not due   Appointment recommended at least every 3 months for opioid prescriptions. Appointment recommended at least every 6 months for ADHD medications.    Clementine Lang

## 2022-02-02 NOTE — TELEPHONE ENCOUNTER
Routing refill request to provider for review/approval because:  Controlled substance request    Last Written Prescription Date:  1/4/22  Last Fill Quantity: 120,  # refills: 0   Last office visit provider:  5/4/21     Requested Prescriptions   Pending Prescriptions Disp Refills     acetaminophen-codeine (TYLENOL #3) 300-30 MG tablet [Pharmacy Med Name: ACETAMINOPHEN/COD #3 (300/30MG) TAB] 120 tablet      Sig: TAKE 1 TABLET BY MOUTH EVERY 6 HOURS AS NEEDED FOR PAIN       There is no refill protocol information for this order          Leonardo Colbert RN 02/02/22 7:44 AM

## 2022-02-06 ENCOUNTER — HOSPITAL ENCOUNTER (INPATIENT)
Facility: HOSPITAL | Age: 77
LOS: 5 days | Discharge: HOME | DRG: 178 | End: 2022-02-12
Attending: EMERGENCY MEDICINE | Admitting: STUDENT IN AN ORGANIZED HEALTH CARE EDUCATION/TRAINING PROGRAM
Payer: COMMERCIAL

## 2022-02-06 ENCOUNTER — NURSE TRIAGE (OUTPATIENT)
Dept: NURSING | Facility: CLINIC | Age: 77
End: 2022-02-06
Payer: COMMERCIAL

## 2022-02-06 DIAGNOSIS — J18.9 PNEUMONIA OF RIGHT UPPER LOBE DUE TO INFECTIOUS ORGANISM: ICD-10-CM

## 2022-02-06 DIAGNOSIS — R53.1 LEFT-SIDED WEAKNESS: ICD-10-CM

## 2022-02-06 DIAGNOSIS — K59.00 CONSTIPATION, UNSPECIFIED CONSTIPATION TYPE: ICD-10-CM

## 2022-02-06 DIAGNOSIS — R27.8 ASTERIXIS: Primary | ICD-10-CM

## 2022-02-06 LAB — GLUCOSE BLDC GLUCOMTR-MCNC: 134 MG/DL (ref 70–99)

## 2022-02-06 PROCEDURE — C9803 HOPD COVID-19 SPEC COLLECT: HCPCS

## 2022-02-06 PROCEDURE — 96366 THER/PROPH/DIAG IV INF ADDON: CPT

## 2022-02-06 PROCEDURE — 96367 TX/PROPH/DG ADDL SEQ IV INF: CPT

## 2022-02-06 PROCEDURE — 96375 TX/PRO/DX INJ NEW DRUG ADDON: CPT

## 2022-02-06 PROCEDURE — 96365 THER/PROPH/DIAG IV INF INIT: CPT

## 2022-02-06 PROCEDURE — 99285 EMERGENCY DEPT VISIT HI MDM: CPT | Mod: 25

## 2022-02-06 RX ORDER — DIPHENHYDRAMINE HYDROCHLORIDE 50 MG/ML
25 INJECTION INTRAMUSCULAR; INTRAVENOUS ONCE
Status: COMPLETED | OUTPATIENT
Start: 2022-02-07 | End: 2022-02-07

## 2022-02-06 RX ORDER — METHYLPREDNISOLONE SODIUM SUCCINATE 125 MG/2ML
125 INJECTION, POWDER, LYOPHILIZED, FOR SOLUTION INTRAMUSCULAR; INTRAVENOUS ONCE
Status: COMPLETED | OUTPATIENT
Start: 2022-02-07 | End: 2022-02-07

## 2022-02-06 ASSESSMENT — MIFFLIN-ST. JEOR: SCORE: 1035.19

## 2022-02-07 ENCOUNTER — APPOINTMENT (OUTPATIENT)
Dept: SPEECH THERAPY | Facility: HOSPITAL | Age: 77
DRG: 178 | End: 2022-02-07
Payer: COMMERCIAL

## 2022-02-07 ENCOUNTER — APPOINTMENT (OUTPATIENT)
Dept: CT IMAGING | Facility: HOSPITAL | Age: 77
DRG: 178 | End: 2022-02-07
Attending: EMERGENCY MEDICINE
Payer: COMMERCIAL

## 2022-02-07 ENCOUNTER — APPOINTMENT (OUTPATIENT)
Dept: OCCUPATIONAL THERAPY | Facility: HOSPITAL | Age: 77
DRG: 178 | End: 2022-02-07
Attending: STUDENT IN AN ORGANIZED HEALTH CARE EDUCATION/TRAINING PROGRAM
Payer: COMMERCIAL

## 2022-02-07 ENCOUNTER — APPOINTMENT (OUTPATIENT)
Dept: RADIOLOGY | Facility: HOSPITAL | Age: 77
DRG: 178 | End: 2022-02-07
Attending: EMERGENCY MEDICINE
Payer: COMMERCIAL

## 2022-02-07 ENCOUNTER — APPOINTMENT (OUTPATIENT)
Dept: SPEECH THERAPY | Facility: HOSPITAL | Age: 77
DRG: 178 | End: 2022-02-07
Attending: STUDENT IN AN ORGANIZED HEALTH CARE EDUCATION/TRAINING PROGRAM
Payer: COMMERCIAL

## 2022-02-07 ENCOUNTER — APPOINTMENT (OUTPATIENT)
Dept: RADIOLOGY | Facility: HOSPITAL | Age: 77
DRG: 178 | End: 2022-02-07
Attending: INTERNAL MEDICINE
Payer: COMMERCIAL

## 2022-02-07 PROBLEM — J18.9 PNEUMONIA OF RIGHT UPPER LOBE DUE TO INFECTIOUS ORGANISM: Status: ACTIVE | Noted: 2022-02-07

## 2022-02-07 PROBLEM — R53.1 LEFT-SIDED WEAKNESS: Status: ACTIVE | Noted: 2022-02-07

## 2022-02-07 LAB
ALBUMIN SERPL-MCNC: 3.3 G/DL (ref 3.5–5)
ALBUMIN UR-MCNC: NEGATIVE MG/DL
ALP SERPL-CCNC: 138 U/L (ref 45–120)
ALT SERPL W P-5'-P-CCNC: 17 U/L (ref 0–45)
ANION GAP SERPL CALCULATED.3IONS-SCNC: 8 MMOL/L (ref 5–18)
APPEARANCE UR: CLEAR
APTT PPP: 26 SECONDS (ref 22–38)
AST SERPL W P-5'-P-CCNC: 14 U/L (ref 0–40)
BILIRUB DIRECT SERPL-MCNC: <0.1 MG/DL
BILIRUB SERPL-MCNC: 0.2 MG/DL (ref 0–1)
BILIRUB UR QL STRIP: NEGATIVE
BUN SERPL-MCNC: 12 MG/DL (ref 8–28)
CALCIUM SERPL-MCNC: 8.7 MG/DL (ref 8.5–10.5)
CHLORIDE BLD-SCNC: 106 MMOL/L (ref 98–107)
CO2 SERPL-SCNC: 25 MMOL/L (ref 22–31)
COLOR UR AUTO: ABNORMAL
CREAT BLD-MCNC: 0.6 MG/DL (ref 0.6–1.1)
CREAT SERPL-MCNC: 0.73 MG/DL (ref 0.6–1.1)
ERYTHROCYTE [DISTWIDTH] IN BLOOD BY AUTOMATED COUNT: 18.1 % (ref 10–15)
FLUAV RNA SPEC QL NAA+PROBE: NEGATIVE
FLUBV RNA RESP QL NAA+PROBE: NEGATIVE
GFR SERPL CREATININE-BSD FRML MDRD: 85 ML/MIN/1.73M2
GFR SERPL CREATININE-BSD FRML MDRD: >60 ML/MIN/1.73M2
GLUCOSE BLD-MCNC: 138 MG/DL (ref 70–125)
GLUCOSE UR STRIP-MCNC: NEGATIVE MG/DL
HCT VFR BLD AUTO: 35.6 % (ref 35–47)
HGB BLD-MCNC: 10.3 G/DL (ref 11.7–15.7)
HGB UR QL STRIP: NEGATIVE
HOLD SPECIMEN: NORMAL
INR PPP: 1.05 (ref 0.85–1.15)
KETONES UR STRIP-MCNC: NEGATIVE MG/DL
LACTATE SERPL-SCNC: 0.9 MMOL/L (ref 0.7–2)
LEUKOCYTE ESTERASE UR QL STRIP: NEGATIVE
MCH RBC QN AUTO: 23.4 PG (ref 26.5–33)
MCHC RBC AUTO-ENTMCNC: 28.9 G/DL (ref 31.5–36.5)
MCV RBC AUTO: 81 FL (ref 78–100)
NITRATE UR QL: NEGATIVE
PH UR STRIP: 6.5 [PH] (ref 5–7)
PLATELET # BLD AUTO: 382 10E3/UL (ref 150–450)
POTASSIUM BLD-SCNC: 3.7 MMOL/L (ref 3.5–5)
PROT SERPL-MCNC: 6.8 G/DL (ref 6–8)
RBC # BLD AUTO: 4.41 10E6/UL (ref 3.8–5.2)
RBC URINE: 0 /HPF
SARS-COV-2 RNA RESP QL NAA+PROBE: NEGATIVE
SODIUM SERPL-SCNC: 139 MMOL/L (ref 136–145)
SP GR UR STRIP: 1.03 (ref 1–1.03)
TROPONIN I SERPL-MCNC: <0.01 NG/ML (ref 0–0.29)
TSH SERPL DL<=0.005 MIU/L-ACNC: 0.25 UIU/ML (ref 0.3–5)
UROBILINOGEN UR STRIP-MCNC: <2 MG/DL
WBC # BLD AUTO: 16.3 10E3/UL (ref 4–11)
WBC URINE: <1 /HPF

## 2022-02-07 PROCEDURE — 258N000003 HC RX IP 258 OP 636: Performed by: STUDENT IN AN ORGANIZED HEALTH CARE EDUCATION/TRAINING PROGRAM

## 2022-02-07 PROCEDURE — 84443 ASSAY THYROID STIM HORMONE: CPT | Performed by: PSYCHIATRY & NEUROLOGY

## 2022-02-07 PROCEDURE — 36415 COLL VENOUS BLD VENIPUNCTURE: CPT | Performed by: EMERGENCY MEDICINE

## 2022-02-07 PROCEDURE — 92526 ORAL FUNCTION THERAPY: CPT | Mod: GN

## 2022-02-07 PROCEDURE — 250N000013 HC RX MED GY IP 250 OP 250 PS 637: Performed by: EMERGENCY MEDICINE

## 2022-02-07 PROCEDURE — 82248 BILIRUBIN DIRECT: CPT | Performed by: STUDENT IN AN ORGANIZED HEALTH CARE EDUCATION/TRAINING PROGRAM

## 2022-02-07 PROCEDURE — 258N000003 HC RX IP 258 OP 636: Performed by: EMERGENCY MEDICINE

## 2022-02-07 PROCEDURE — 82565 ASSAY OF CREATININE: CPT

## 2022-02-07 PROCEDURE — 85027 COMPLETE CBC AUTOMATED: CPT | Performed by: EMERGENCY MEDICINE

## 2022-02-07 PROCEDURE — 87149 DNA/RNA DIRECT PROBE: CPT | Performed by: EMERGENCY MEDICINE

## 2022-02-07 PROCEDURE — 87636 SARSCOV2 & INF A&B AMP PRB: CPT | Performed by: EMERGENCY MEDICINE

## 2022-02-07 PROCEDURE — 92526 ORAL FUNCTION THERAPY: CPT | Mod: GN | Performed by: SPEECH-LANGUAGE PATHOLOGIST

## 2022-02-07 PROCEDURE — 93005 ELECTROCARDIOGRAM TRACING: CPT | Performed by: EMERGENCY MEDICINE

## 2022-02-07 PROCEDURE — 92611 MOTION FLUOROSCOPY/SWALLOW: CPT | Mod: GN

## 2022-02-07 PROCEDURE — 92610 EVALUATE SWALLOWING FUNCTION: CPT | Mod: GN | Performed by: SPEECH-LANGUAGE PATHOLOGIST

## 2022-02-07 PROCEDURE — 36415 COLL VENOUS BLD VENIPUNCTURE: CPT | Performed by: PSYCHIATRY & NEUROLOGY

## 2022-02-07 PROCEDURE — 99223 1ST HOSP IP/OBS HIGH 75: CPT | Performed by: PSYCHIATRY & NEUROLOGY

## 2022-02-07 PROCEDURE — 97535 SELF CARE MNGMENT TRAINING: CPT | Mod: GO

## 2022-02-07 PROCEDURE — 250N000013 HC RX MED GY IP 250 OP 250 PS 637: Performed by: STUDENT IN AN ORGANIZED HEALTH CARE EDUCATION/TRAINING PROGRAM

## 2022-02-07 PROCEDURE — 84439 ASSAY OF FREE THYROXINE: CPT | Performed by: PSYCHIATRY & NEUROLOGY

## 2022-02-07 PROCEDURE — 250N000011 HC RX IP 250 OP 636: Performed by: STUDENT IN AN ORGANIZED HEALTH CARE EDUCATION/TRAINING PROGRAM

## 2022-02-07 PROCEDURE — 250N000011 HC RX IP 250 OP 636: Performed by: EMERGENCY MEDICINE

## 2022-02-07 PROCEDURE — 71045 X-RAY EXAM CHEST 1 VIEW: CPT

## 2022-02-07 PROCEDURE — 99222 1ST HOSP IP/OBS MODERATE 55: CPT | Performed by: STUDENT IN AN ORGANIZED HEALTH CARE EDUCATION/TRAINING PROGRAM

## 2022-02-07 PROCEDURE — 74230 X-RAY XM SWLNG FUNCJ C+: CPT

## 2022-02-07 PROCEDURE — 84484 ASSAY OF TROPONIN QUANT: CPT | Performed by: EMERGENCY MEDICINE

## 2022-02-07 PROCEDURE — 81001 URINALYSIS AUTO W/SCOPE: CPT | Performed by: EMERGENCY MEDICINE

## 2022-02-07 PROCEDURE — 250N000013 HC RX MED GY IP 250 OP 250 PS 637: Performed by: INTERNAL MEDICINE

## 2022-02-07 PROCEDURE — 83605 ASSAY OF LACTIC ACID: CPT | Performed by: EMERGENCY MEDICINE

## 2022-02-07 PROCEDURE — 85730 THROMBOPLASTIN TIME PARTIAL: CPT | Performed by: EMERGENCY MEDICINE

## 2022-02-07 PROCEDURE — 120N000001 HC R&B MED SURG/OB

## 2022-02-07 PROCEDURE — 87077 CULTURE AEROBIC IDENTIFY: CPT | Performed by: EMERGENCY MEDICINE

## 2022-02-07 PROCEDURE — 99448 NTRPROF PH1/NTRNET/EHR 21-30: CPT | Performed by: PSYCHIATRY & NEUROLOGY

## 2022-02-07 PROCEDURE — 70496 CT ANGIOGRAPHY HEAD: CPT

## 2022-02-07 PROCEDURE — 85610 PROTHROMBIN TIME: CPT | Performed by: EMERGENCY MEDICINE

## 2022-02-07 PROCEDURE — 80053 COMPREHEN METABOLIC PANEL: CPT | Performed by: EMERGENCY MEDICINE

## 2022-02-07 PROCEDURE — 97166 OT EVAL MOD COMPLEX 45 MIN: CPT | Mod: GO

## 2022-02-07 RX ORDER — POLYVINYL ALCOHOL 14 MG/ML
2 SOLUTION/ DROPS OPHTHALMIC
Status: DISCONTINUED | OUTPATIENT
Start: 2022-02-07 | End: 2022-02-12 | Stop reason: HOSPADM

## 2022-02-07 RX ORDER — BARIUM SULFATE 400 MG/ML
SUSPENSION ORAL ONCE
Status: COMPLETED | OUTPATIENT
Start: 2022-02-07 | End: 2022-02-07

## 2022-02-07 RX ORDER — IOPAMIDOL 755 MG/ML
75 INJECTION, SOLUTION INTRAVASCULAR ONCE
Status: COMPLETED | OUTPATIENT
Start: 2022-02-07 | End: 2022-02-07

## 2022-02-07 RX ORDER — NORTRIPTYLINE HYDROCHLORIDE 50 MG/1
50 CAPSULE ORAL 2 TIMES DAILY
Status: DISCONTINUED | OUTPATIENT
Start: 2022-02-07 | End: 2022-02-12 | Stop reason: HOSPADM

## 2022-02-07 RX ORDER — CEFTRIAXONE 1 G/1
1 INJECTION, POWDER, FOR SOLUTION INTRAMUSCULAR; INTRAVENOUS ONCE
Status: COMPLETED | OUTPATIENT
Start: 2022-02-07 | End: 2022-02-07

## 2022-02-07 RX ORDER — ONDANSETRON 2 MG/ML
4 INJECTION INTRAMUSCULAR; INTRAVENOUS EVERY 6 HOURS PRN
Status: DISCONTINUED | OUTPATIENT
Start: 2022-02-07 | End: 2022-02-12 | Stop reason: HOSPADM

## 2022-02-07 RX ORDER — ACETAMINOPHEN 325 MG/1
650 TABLET ORAL EVERY 6 HOURS PRN
Status: DISCONTINUED | OUTPATIENT
Start: 2022-02-07 | End: 2022-02-12 | Stop reason: HOSPADM

## 2022-02-07 RX ORDER — ACETAMINOPHEN 650 MG/1
650 SUPPOSITORY RECTAL EVERY 6 HOURS PRN
Status: DISCONTINUED | OUTPATIENT
Start: 2022-02-07 | End: 2022-02-12 | Stop reason: HOSPADM

## 2022-02-07 RX ORDER — LIDOCAINE 40 MG/G
CREAM TOPICAL
Status: DISCONTINUED | OUTPATIENT
Start: 2022-02-07 | End: 2022-02-12 | Stop reason: HOSPADM

## 2022-02-07 RX ORDER — VANCOMYCIN HYDROCHLORIDE 1 G/200ML
20 INJECTION, SOLUTION INTRAVENOUS
Status: DISCONTINUED | OUTPATIENT
Start: 2022-02-08 | End: 2022-02-10

## 2022-02-07 RX ORDER — TOPIRAMATE 25 MG/1
50 TABLET, FILM COATED ORAL AT BEDTIME
Status: DISCONTINUED | OUTPATIENT
Start: 2022-02-07 | End: 2022-02-12 | Stop reason: HOSPADM

## 2022-02-07 RX ORDER — SODIUM CHLORIDE 9 MG/ML
INJECTION, SOLUTION INTRAVENOUS CONTINUOUS
Status: DISCONTINUED | OUTPATIENT
Start: 2022-02-07 | End: 2022-02-08

## 2022-02-07 RX ORDER — LORAZEPAM 2 MG/ML
1 INJECTION INTRAMUSCULAR ONCE
Status: DISCONTINUED | OUTPATIENT
Start: 2022-02-07 | End: 2022-02-12 | Stop reason: HOSPADM

## 2022-02-07 RX ORDER — ASPIRIN 81 MG/1
324 TABLET, CHEWABLE ORAL ONCE
Status: COMPLETED | OUTPATIENT
Start: 2022-02-07 | End: 2022-02-07

## 2022-02-07 RX ORDER — PANTOPRAZOLE SODIUM 40 MG/1
40 TABLET, DELAYED RELEASE ORAL
Status: DISCONTINUED | OUTPATIENT
Start: 2022-02-07 | End: 2022-02-12 | Stop reason: HOSPADM

## 2022-02-07 RX ORDER — ONDANSETRON 4 MG/1
4 TABLET, ORALLY DISINTEGRATING ORAL EVERY 6 HOURS PRN
Status: DISCONTINUED | OUTPATIENT
Start: 2022-02-07 | End: 2022-02-12 | Stop reason: HOSPADM

## 2022-02-07 RX ORDER — CEFTRIAXONE 1 G/1
1 INJECTION, POWDER, FOR SOLUTION INTRAMUSCULAR; INTRAVENOUS EVERY 24 HOURS
Status: DISCONTINUED | OUTPATIENT
Start: 2022-02-07 | End: 2022-02-11

## 2022-02-07 RX ORDER — FERROUS SULFATE 325(65) MG
325 TABLET ORAL
Status: DISCONTINUED | OUTPATIENT
Start: 2022-02-07 | End: 2022-02-12 | Stop reason: HOSPADM

## 2022-02-07 RX ADMIN — BARIUM SULFATE 60 ML: 400 SUSPENSION ORAL at 12:32

## 2022-02-07 RX ADMIN — DIPHENHYDRAMINE HYDROCHLORIDE 25 MG: 50 INJECTION, SOLUTION INTRAMUSCULAR; INTRAVENOUS at 00:13

## 2022-02-07 RX ADMIN — ACETAMINOPHEN AND CODEINE PHOSPHATE 1 TABLET: 300; 30 TABLET ORAL at 21:42

## 2022-02-07 RX ADMIN — SODIUM CHLORIDE, PRESERVATIVE FREE: 5 INJECTION INTRAVENOUS at 18:02

## 2022-02-07 RX ADMIN — IOPAMIDOL 75 ML: 755 INJECTION, SOLUTION INTRAVENOUS at 00:40

## 2022-02-07 RX ADMIN — ACETAMINOPHEN AND CODEINE PHOSPHATE 1 TABLET: 300; 30 TABLET ORAL at 02:39

## 2022-02-07 RX ADMIN — CEFTRIAXONE SODIUM 1 G: 1 INJECTION, POWDER, FOR SOLUTION INTRAMUSCULAR; INTRAVENOUS at 01:40

## 2022-02-07 RX ADMIN — OXCARBAZEPINE 450 MG: 300 TABLET, FILM COATED ORAL at 21:42

## 2022-02-07 RX ADMIN — FERROUS SULFATE TAB 325 MG (65 MG ELEMENTAL FE) 325 MG: 325 (65 FE) TAB at 10:07

## 2022-02-07 RX ADMIN — SODIUM CHLORIDE, PRESERVATIVE FREE: 5 INJECTION INTRAVENOUS at 04:42

## 2022-02-07 RX ADMIN — NORTRIPTYLINE HYDROCHLORIDE 50 MG: 50 CAPSULE ORAL at 21:35

## 2022-02-07 RX ADMIN — CEFTRIAXONE SODIUM 1 G: 1 INJECTION, POWDER, FOR SOLUTION INTRAMUSCULAR; INTRAVENOUS at 21:26

## 2022-02-07 RX ADMIN — PANTOPRAZOLE SODIUM 40 MG: 20 TABLET, DELAYED RELEASE ORAL at 10:07

## 2022-02-07 RX ADMIN — METHYLPREDNISOLONE SODIUM SUCCINATE 125 MG: 125 INJECTION, POWDER, FOR SOLUTION INTRAMUSCULAR; INTRAVENOUS at 00:12

## 2022-02-07 RX ADMIN — BARIUM SULFATE 20 ML: 400 SUSPENSION ORAL at 12:32

## 2022-02-07 RX ADMIN — ACETAMINOPHEN AND CODEINE PHOSPHATE 1 TABLET: 300; 30 TABLET ORAL at 13:34

## 2022-02-07 RX ADMIN — TOPIRAMATE 50 MG: 25 TABLET, FILM COATED ORAL at 21:41

## 2022-02-07 RX ADMIN — AZITHROMYCIN MONOHYDRATE 500 MG: 500 INJECTION, POWDER, LYOPHILIZED, FOR SOLUTION INTRAVENOUS at 23:53

## 2022-02-07 RX ADMIN — AZITHROMYCIN MONOHYDRATE 500 MG: 500 INJECTION, POWDER, LYOPHILIZED, FOR SOLUTION INTRAVENOUS at 02:25

## 2022-02-07 RX ADMIN — ASPIRIN 324 MG: 81 TABLET, CHEWABLE ORAL at 01:22

## 2022-02-07 ASSESSMENT — ACTIVITIES OF DAILY LIVING (ADL)
ADLS_ACUITY_SCORE: 12
PREVIOUS_RESPONSIBILITIES: MEAL PREP;HOUSEKEEPING;LAUNDRY;MEDICATION MANAGEMENT;FINANCES
ADLS_ACUITY_SCORE: 9
ADLS_ACUITY_SCORE: 12
ADLS_ACUITY_SCORE: 12
DEPENDENT_IADLS:: TRANSPORTATION
ADLS_ACUITY_SCORE: 12
ADLS_ACUITY_SCORE: 9
ADLS_ACUITY_SCORE: 9
ADLS_ACUITY_SCORE: 12
ADLS_ACUITY_SCORE: 9
ADLS_ACUITY_SCORE: 12
ADLS_ACUITY_SCORE: 12
ADLS_ACUITY_SCORE: 9
ADLS_ACUITY_SCORE: 12
ADLS_ACUITY_SCORE: 15
ADLS_ACUITY_SCORE: 12
ADLS_ACUITY_SCORE: 12
ADLS_ACUITY_SCORE: 9

## 2022-02-07 ASSESSMENT — MIFFLIN-ST. JEOR: SCORE: 1028.39

## 2022-02-07 NOTE — PROGRESS NOTES
"CLINICAL SWALLOW EVALUATION     02/07/22 1000   General Information   Onset of Illness/Injury or Date of Surgery 02/06/22   Referring Physician Scottie Ledesma MD   Patient/Family Therapy Goal Statement (SLP) Patient would like to eat/drink.   Pertinent History of Current Problem Per MD Note: \"Julisa Chaney is a 76-year-old female with past medical history of hyperlipidemia, migraine who presents with generalized weakness.  Reports she has been having progressive generalized weakness since about 3 days ago with associated poor appetite and diarrhea which has now resolved.  Since the morning of 2/6 noticed that she still feels weak but more so on the left upper extremity and presented to the ED.  Also reported to have intermittent episodes of slurred speech and forgetfulness over the last few days.  Noted to be febrile at the ED but patient did not notice that.  She denies any recent vision changes, chest pain, cough, shortness of breath or palpitations.  Initially stroke code was called at ED with work-up negative for any acute findings, and based on clinical presentation MRI was planned and stroke call was escalated.\" Neurology consult pending. CXR\" mild hazy right perihilar infiltrate.   General Observations VFSS completed 2/18/2015 with recommendation for regular diet and thin liquids with use of chin tuck. At that time patient preferred \"nectar thick\" liquids but cleared for thin liquids. Patient lives in house with 20 year old grandson as caregiver; she reported each fixing their own meals. Patient reported no swallowing concerns and possibly has not been eating due to UE weakness and \"jerking\" movements. Patient reported no new word-finding difficulty, but difficulty forming words at times due to facial pain and tongue weakness. Patient reported intermittent facial pain (associated with certain movements; e.g. puckering lips). Patient reported her father had Ansley Gehrig's disease.   Past History of Dysphagia " "See above   Type of Evaluation   Type of Evaluation Swallow Evaluation   Oral Motor   Oral Musculature anomalies present   Structural Abnormalities none present   Mucosal Quality dry   Dentition (Oral Motor)   Dentition (Oral Motor) significant number of missing teeth  (no dentures/partials)   Facial Symmetry (Oral Motor)   Facial Symmetry (Oral Motor) right side impairment   Right Side Facial Asymmetry moderate impairment   Comment, Facial Symmetry (Oral Motor) R cheek appeared \"sunken\" in   Lip Function (Oral Motor)   Lip Range of Motion (Oral Motor) protrusion impairment;retraction impairment   Protrusion, Lip Range of Motion moderate impairment   Retraction, Lip Range of Motion right side;minimal impairment   Lip Strength (Oral Motor) WFL   Tongue Function (Oral Motor)   Tongue ROM (Oral Motor) protrusion is impaired;lateralization is impaired   Protrusion, Tongue ROM Impairment (Oral Motor) minimal impairment   Lateralization, Tongue ROM Impairment (Oral Motor) minimal impairment   Tongue Strength (Oral Motor) strength decreased;left side;right side  (right weaker than left side)   Tongue Coordination/Speed (Oral Motor) reduced rate   Jaw Function (Oral Motor)   Jaw Function (Oral Motor) WNL   Cough/Swallow/Gag Reflex (Oral Motor)   Soft Palate/Velum (Oral Motor) elevation decreased on right   Volitional Throat Clear/Cough (Oral Motor) reduced strength   Vocal Quality/Secretion Management (Oral Motor)   Vocal Quality (Oral Motor) breathy;hypophonic;impaired duration of phonation   Comment, Vocal Quality/Secretion Management (Oral Motor) Note mild hypernasality; suspect velopharyngeal insufficiency   General Swallowing Observations   Current Diet/Method of Nutritional Intake (General Swallowing Observations, NIS) clear liquid diet  (ADAT pending swallow evaluation per RN)   Respiratory Support (General Swallowing Observations) none   Swallowing Evaluation Clinical swallow evaluation   Clinical Swallow Evaluation "   Feeding Assistance dependent   Clinical Swallow Evaluation Textures Trialed thin liquids;pureed;minced & moist;soft & bite-sized   Clinical Swallow Eval: Thin Liquid Texture Trial   Mode of Presentation, Thin Liquids spoon;cup;fed by clinician   Volume of Liquid or Food Presented 2-3 oz thin water   Oral Phase of Swallow WFL  (mild difficulty with oral acceptance from spoon)   Pharyngeal Phase of Swallow   (no overt aspiration signs)   Clinical Swallow Evaluation: Puree Solid Texture Trial   Mode of Presentation, Puree spoon;fed by clinician   Volume of Puree Presented 5 bites applesauce   Oral Phase, Puree WFL   Pharyngeal Phase, Puree   (no overt aspiration signs)   Clinical Swallow Eval: Minced & Moist   Mode of Presentation spoon;fed by clinician   Volume Presented 3 bites   Oral Phase WFL   Pharyngeal Phase   (no overt aspiration signs)   Clinical Swallow Eval: Soft & Bite Sized   Mode of Presentation spoon;fed by clinician   Volume Presented 3 bites canned peaches (diced)   Oral Phase impaired mastication  (mild)   Pharyngeal Phase   (no overt aspiration signs)   Esophageal Phase of Swallow   Patient reports or presents with symptoms of esophageal dysphagia No   Swallowing Recommendations   Diet Consistency Recommendations minced & moist (level 5);thin liquids (level 0)   Supervision Level for Intake 1:1 supervision needed   Mode of Delivery Recommendations bolus size, small;no straws;slow rate of intake   Postural Recommendations chin tuck   Monitoring/Assistance Required (Eating/Swallowing) stop eating activities when fatigue is present;monitor for cough or change in vocal quality with intake   Recommended Feeding/Eating Techniques (Swallow Eval) maintain upright sitting position for eating;provide assist with feeding   Medication Administration Recommendations, Swallowing (SLP) One at a time   Instrumental Assessment Recommendations VFSS (videofluroscopic swallowing study)   General Therapy Interventions    Planned Therapy Interventions Dysphagia Treatment   Dysphagia treatment Oropharyngeal exercise training;Modified diet education;Instruction of safe swallow strategies;Compensatory strategies for swallowing   SLP Therapy Assessment/Plan   Criteria for Skilled Therapeutic Interventions Met (SLP Eval) yes;treatment indicated   SLP Diagnosis Mild oropharyngeal dysphagia based on clinical exam   Rehab Potential (SLP Eval) good, to achieve stated therapy goals   Therapy Frequency (SLP Eval) 5 times/wk   Predicted Duration of Therapy Intervention (SLP Eval) LOS   Comment, Therapy Assessment/Plan (SLP) Mild oropharyngeal dysphagia based on clinical exam characterized by mild-moderate oral motor impairment, mild-moderate oral bolus incoordination and mildly extended oral phase. No overt aspiration signs occurred across consistencies today (no coughing, no throat clearing, vocal quality and breath sounds unchanged). Patient does have remote hx of dysphagia (2015) with laryngeal penetration of thin and mildly thick liquids when not using chin tuck posture. CXR indicated mild right sided pneumonia. Recommend further instrumental swallow assessment given CXR finding, remote hx of dysphagia and concern for silent aspiration. Recommend advance diet to minced/moist textures and continue thin liquids (IDDSI 5, 0) for now given 1:1 assist and swallow strategies (fully upright position, small bites/sips, slow rate, no straws). Please serve pills one at a time. Monitor for signs/symptoms of aspiration and hold PO if occur. SLP to follow for dysphagia and dysarthria assessment.   Therapy Plan Review/Discharge Plan (SLP)   Therapy Plan Review (SLP) evaluation/treatment results reviewed;care plan/treatment goals reviewed;risks/benefits reviewed;current/potential barriers reviewed;participants voiced agreement with care plan;participants included;patient   Demonstrates Need for Referral to Another Service (SLP) occupational  therapist;physical therapist   SLP Discharge Planning    SLP Discharge Recommendation (DC Rec) Transitional Care Facility   SLP Rationale for DC Rec Below baseline for swallowing.   SLP Brief overview of current status  Recommend advance diet to minced/moist textures and continue thin liquids (IDDSI 5, 0) for now given 1:1 assist and swallow strategies (fully upright position, small bites/sips, slow rate, no straws). Please serve pills one at a time. Recommend further instrumental swallow assessment to further assess oropharyngeal swallow function.    Total Evaluation Time   Total Evaluation Time (Minutes) 15

## 2022-02-07 NOTE — H&P
"[facility]    History and Physical - Hospitalist Service       Date of Admission:  2/6/2022    Assessment & Plan      Julisa Chaney is a 76 year old female admitted on 2/6/2022. She     76-year-old female with past medical history of hyperlipidemia, migraine who presents with fever, generalized weakness, and subjective left-sided weakness.  Stroke work-up so far negative at the ED based on CT and CTA, MRI still pending.  On work-up at ED she was found to to have pneumonia.      Generalized weakness  Tremors  -Generalized weakness intermittent episodes of slurred speech, reports left side more weak than the right.  -Initially stroke code was called at ED, then the deescalated  -CT and CT angiogram no acute changes or major vessel occlusions, also with no objective focal deficits on exam  -Patient declined MRI, she wants to be \" totally out\" just like when she had her colonoscopy, declined to try even with Ativan.  Can be tried in the morning if this is strongly needed after neurology eval.    -Neurochecks Q4 hrs, PT/OT, Monitor on tele, neurology consult    Community acquired pneumonia  -WBC 16.3, chest x-ray with right perihilar infiltrate  -Ceftriaxone and azithromycin, incentive spirometry      Iron deficiency anemia  -Resume oral iron, monitor Hgb serially           Diet: NPO for Medical/Clinical Reasons Except for: No Exceptions    DVT Prophylaxis: Pneumatic Compression Devices  Abrams Catheter: Not present  Central Lines: None  Cardiac Monitoring: None  Code Status:  Full     Clinically Significant Risk Factors Present on Admission        Disposition Plan   Expected Discharge: 1-2 days     The patient's care was discussed with the Patient.    Scottie Ledesma MD  Hospitalist Service  [facility]  Securely message with the Vocera Web Console (learn more here)  Text page via [employer] Paging/Directory "         ______________________________________________________________________    Chief Complaint   Generalized weakness    History is obtained from the patient    History of Present Illness   Julisa Chaney is a 76-year-old female with past medical history of hyperlipidemia, migraine who presents with generalized weakness.  Reports she has been having progressive generalized weakness since about 3 days ago with associated poor appetite and diarrhea which has now resolved.  Since the morning of 2/6 noticed that she still feels weak but more so on the left upper extremity and presented to the ED.  Also reported to have intermittent episodes of slurred speech and forgetfulness over the last few days.  Noted to be febrile at the ED but patient did not notice that.  She denies any recent vision changes, chest pain, cough, shortness of breath or palpitations.  Initially stroke code was called at ED with work-up negative for any acute findings, and based on clinical presentation MRI was planned and stroke call was escalated.      Review of Systems    The 10 point Review of Systems is negative other than noted in the HPI or here.     Past Medical History    I have reviewed this patient's medical history and updated it with pertinent information if needed.   Past Medical History:   Diagnosis Date     High cholesterol      Migraines      Sepsis (H) 8/10/2020       Past Surgical History   I have reviewed this patient's surgical history and updated it with pertinent information if needed.  Past Surgical History:   Procedure Laterality Date     HYSTERECTOMY       MD ESOPHAGOGASTRODUODENOSCOPY TRANSORAL DIAGNOSTIC N/A 8/13/2020    Procedure: ESOPHAGOGASTRODUODENOSCOPY (EGD);  Surgeon: Nathan Smalls MD;  [place] [facility] [place]     MD ESOPHAGOGASTRODUODENOSCOPY TRANSORAL DIAGNOSTIC N/A 8/14/2020    Procedure: ESOPHAGOGASTRODUODENOSCOPY (EGD), PYLORIC DILATION;  Surgeon: Nathan Smalls MD;   [place] [facility] [place]enterology     RUST COLONOSCOPY W/WO BRUSH/WASH N/A 2020    Procedure: COLONOSCOPY WITH POLYPECTOMY;  Surgeon: Nathan Smalls MD;  [place] [facility] [place]  Service: Gastroenterology       Social History   I have reviewed this patient's social history and updated it with pertinent information if needed.  Social History     Tobacco Use     Smoking status: Former Smoker     Packs/day: 1.00     Years: 50.00     Pack years: 50.00     Quit date: 2014     Years since quittin.2     Smokeless tobacco: Never Used   Substance Use Topics     Alcohol use: No     Drug use: No       Family History   I have reviewed this patient's family history and updated it with pertinent information if needed.  Family History   Problem Relation Age of Onset     Cancer Father      Testicular cancer Grandchild 17.00     Breast Cancer Mother      Breast Cancer Sister      Breast Cancer Maternal Aunt      Breast Cancer Sister        Prior to Admission Medications   Prior to Admission Medications   Prescriptions Last Dose Informant Patient Reported? Taking?   ARTIFICIAL TEARS 1.4 % ophthalmic solution   No No   Sig: INSTILL 2 DROPS IN BOTH EYES AS NEEDED   OXcarbazepine (TRILEPTAL) 150 MG tablet   No No   Sig: TAKE 3 TABLETS(450 MG) BY MOUTH AT BEDTIME   QUEtiapine (SEROQUEL) 25 MG tablet   No No   Sig: [QUETIAPINE (SEROQUEL) 25 MG TABLET] Take 1 tablet (25 mg total) by mouth at bedtime.   QUEtiapine (SEROQUEL) 25 MG tablet   No No   Sig: [QUETIAPINE (SEROQUEL) 25 MG TABLET] TAKE 1 TABLET(25 MG) BY MOUTH AT BEDTIME   QUEtiapine (SEROQUEL) 25 MG tablet   No No   Sig: TAKE 1 TABLET(25 MG) BY MOUTH AT BEDTIME   acetaminophen-codeine (TYLENOL #3) 300-30 MG tablet   No No   Sig: TAKE 1 TABLET BY MOUTH EVERY 6 HOURS AS NEEDED FOR PAIN   acetaminophen-codeine (TYLENOL #3) 300-30 mg per tablet   No No   Sig: [ACETAMINOPHEN-CODEINE (TYLENOL #3) 300-30 MG PER TABLET] Take 1 tablet by mouth  "every 6 (six) hours as needed for pain.   acetaminophen-codeine (TYLENOL #3) 300-30 mg per tablet   No No   Sig: [ACETAMINOPHEN-CODEINE (TYLENOL #3) 300-30 MG PER TABLET] TAKE 1 TABLET BY MOUTH EVERY 6 HOURS AS NEEDED FOR PAIN   cholecalciferol, vitamin D3, 50 mcg (2,000 unit) Tab   No No   Sig: [CHOLECALCIFEROL, VITAMIN D3, 50 MCG (2,000 UNIT) TAB] Take 1 tablet (2,000 Units total) by mouth daily. One tab daily   desonide (DESOWEN) 0.05 % cream   No No   Sig: [DESONIDE (DESOWEN) 0.05 % CREAM] Apply to affected area 2 times daily   ferrous sulfate 325 (65 FE) MG tablet   No No   Sig: [FERROUS SULFATE 325 (65 FE) MG TABLET] Take 1 tablet (325 mg total) by mouth daily with breakfast.   multivitamin (DAILY-DAVID) per tablet   No No   Sig: [MULTIVITAMIN (DAILY-DAVID) PER TABLET] Take 1 tablet by mouth daily.   nortriptyline (PAMELOR) 25 MG capsule   No No   Sig: TAKE 2 CAPSULES BY MOUTH TWICE DAILY   omeprazole (PRILOSEC) 40 MG DR capsule   No No   Sig: TAKE 1 CAPSULE(40 MG) BY MOUTH DAILY BEFORE BREAKFAST   omeprazole (PRILOSEC) 40 MG capsule   No No   Sig: [OMEPRAZOLE (PRILOSEC) 40 MG CAPSULE] TAKE 1 CAPSULE(40 MG) BY MOUTH DAILY BEFORE BREAKFAST   omeprazole (PRILOSEC) 40 MG capsule   No No   Sig: [OMEPRAZOLE (PRILOSEC) 40 MG CAPSULE] TAKE 1 CAPSULE(40 MG) BY MOUTH DAILY BEFORE BREAKFAST   topiramate (TOPAMAX) 50 MG tablet   No No   Sig: TAKE 1 TABLET BY MOUTH EVERY DAY      Facility-Administered Medications: None     Allergies   Allergies   Allergen Reactions     Contrast [Iohexol] Rash     Noticed during 08/2020 admission. Received IV contrast on 8/5     Chocolate Headache     Mirtazapine Other (See Comments)     \"Needles to the face\"     Penicillins Rash       Physical Exam   Vital Signs: Temp: 100.4  F (38  C) Temp src: Oral BP: 116/59 Pulse: 85   Resp: 24 SpO2: 97 % O2 Device: None (Room air)    Weight: 113 lbs 0 oz    General Appearance: Alert oriented  Eyes: Pink conjunctiva, denies  HEENT: PERRLA, " EOMI  Respiratory: Bilaterally clear chest  Cardiovascular: S1, S2 regular, no murmur or gallop  GI: Soft nontender abdomen  Genitourinary: No SP tenderness  Skin: No rashes  Musculoskeletal: No peripheral edema  Neurologic: AOx3, no neck stiffness, upper and lower extremity bilaterally symmetric 5/5, comparable tone bilaterally.      Data   Data reviewed today: I reviewed all medications, new labs and imaging results over the last 24 hours. I personally reviewed   Recent Labs   Lab 02/07/22  0019 02/07/22  0012 02/07/22  0011 02/06/22  2353   WBC  --   --  16.3*  --    HGB  --   --  10.3*  --    MCV  --   --  81  --    PLT  --   --  382  --    INR  --  1.05  --   --    NA  --   --  139  --    POTASSIUM  --   --  3.7  --    CHLORIDE  --   --  106  --    CO2  --   --  25  --    BUN  --   --  12  --    CR 0.6  --  0.73  --    ANIONGAP  --   --  8  --    ADY  --   --  8.7  --    GLC  --   --  138* 134*   ALBUMIN  --   --  3.3*  --    PROTTOTAL  --   --  6.8  --    BILITOTAL  --   --  0.2  --    ALKPHOS  --   --  138*  --    ALT  --   --  17  --    AST  --   --  14  --      Recent Results (from the past 24 hour(s))   CTA Head Neck with Contrast    Narrative    EXAM: CTA  HEAD NECK WITH CONTRAST  LOCATION: [facility]  DATE/TIME: 2/7/2022 12:02 AM    INDICATION: Code Stroke to evaluate for potential thrombolysis and thrombectomy.  PLEASE READ IMMEDIATELY. Left-sided weakness and slurred speech.  COMPARISON: Head CT 02/06/2019.  CONTRAST: 75 ml Isovue 370.  TECHNIQUE: Head and neck CT angiogram with IV contrast. Noncontrast head CT followed by axial helical CT images of the head and neck vessels obtained during the arterial phase of intravenous contrast administration. Axial 2D reconstructed images and   multiplanar 3D MIP reconstructed images of the head and neck vessels were performed by the technologist. Dose reduction techniques were used. All stenosis measurements made according to NASCET  criteria unless otherwise specified.    FINDINGS:   NONCONTRAST HEAD CT:   INTRACRANIAL CONTENTS: No intracranial hemorrhage, extraaxial collection, or mass effect. No CT evidence of acute infarct. Mild presumed chronic small vessel ischemic changes. Mild generalized volume loss. No hydrocephalus. There are small chronic   infarcts in the bilateral cerebellar hemispheres.     VISUALIZED ORBITS/SINUSES/MASTOIDS: No intraorbital abnormality. No paranasal sinus mucosal disease. No middle ear or mastoid effusion.    BONES/SOFT TISSUES: No acute abnormality.    HEAD CTA:  ANTERIOR CIRCULATION: No major vessel intraluminal filling defect, flow-limiting stenosis or occlusion. Mild to moderate atheromatous changes in the carotid siphons. Within the limits of CTA, no convincing aneurysm or high flow vascular lesion. Standard   [place] anatomy.    POSTERIOR CIRCULATION: No major vessel intraluminal filling defect, flow-limiting stenosis or occlusion. There are mild scattered atheromatous changes. Dominant right and smaller left vertebral artery contribute to a normal basilar artery.     DURAL VENOUS SINUSES: Expected enhancement of the major dural venous sinuses.    NECK CTA:  RIGHT CAROTID: No measurable stenosis or dissection.    LEFT CAROTID: No measurable stenosis or dissection.    VERTEBRAL ARTERIES: No focal stenosis or dissection. Dominant right and smaller left vertebral arteries.    AORTIC ARCH: Vascular variant aortic arch origin left vertebral artery.  No significant stenosis at the origin of the great vessels.    NONVASCULAR STRUCTURES: Right upper lobe airspace infiltrate. Underlying mild centrilobular emphysema.      Impression    IMPRESSION:   HEAD CT:  1.  No CT evidence of acute intracranial hemorrhage, mass or recent infarct.  2.  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  3.  Mild volume loss and presumed chronic small vessel ischemic changes.    HEAD CTA:   1.  No major vessel acute  thromboembolism, flow-limiting stenosis or occlusion.  2.  Evidence of mild intracranial atherosclerosis.    NECK CTA:  1.  Normal neck CTA.  2.  Evidence of a right upper lobe infiltrate. Correlate for pneumonia.  3.  Underlying mild centrilobular emphysema.    Results were called to Dr. Park at 12:39 AM on 02/07/2022.   XR Chest Port 1 View    Narrative    EXAM: XR CHEST PORT 1 VIEW  LOCATION: [facility]  DATE/TIME: 2/7/2022 12:51 AM    INDICATION: Fever.  COMPARISON: 8/8/2020.      Impression    IMPRESSION: Question some mild hazy right perihilar infiltrate with a mild or low-grade pneumonitis not excluded. Left lung is clear. Normal heart size and pulmonary vascularity. Osteopenia. Remainder unremarkable.

## 2022-02-07 NOTE — ED TRIAGE NOTES
Pt brought in by her primary caregiver--her grandson. Pt complains of generalized weakness with tremors in legs, decreased appetite, slurred speech on/off. Pt usually walks and is too weak to walk.

## 2022-02-07 NOTE — PROGRESS NOTES
Occupational Therapy      02/07/22 1300   Quick Adds   Type of Visit Initial Occupational Therapy Evaluation   Living Environment   People in home grandchild(geetha)   Current Living Arrangements house   Home Accessibility stairs to enter home;stairs within home   Number of Stairs, Main Entrance 1   Stair Railings, Main Entrance railings safe and in good condition   Number of Stairs, Within Home, Primary 9  (9 Stairs upstairs/9 stairs downstairs- laundry downstairs )   Stair Railings, Within Home, Primary railings safe and in good condition   Transportation Anticipated family or friend will provide   Living Environment Comments Tub/shower has SC /GB, standard toilet no GB    Self-Care   Usual Activity Tolerance moderate   Current Activity Tolerance moderate   Regular Exercise No   Equipment Currently Used at Home none   Activity/Exercise/Self-Care Comment Ind. w/ dressing/toileting, pt stated she does not get in tub but sponge  bathes.    Instrumental Activities of Daily Living (IADL)   Previous Responsibilities meal prep;housekeeping;laundry;medication management;finances   Disability/Function   Hearing Difficulty or Deaf no   Wear Glasses or Blind yes   Vision Management glasses    Difficulty Communicating no   Walking or Climbing Stairs Difficulty yes   Walking or Climbing Stairs ambulation difficulty, assistance 1 person   Dressing/Bathing Difficulty yes   Dressing/Bathing bathing difficulty, requires equipment   Toileting issues no   Doing Errands Independently Difficulty (such as shopping) yes   Errands Management Portia assists w/ all grocery shopping    Fall history within last six months no   General Information   Onset of Illness/Injury or Date of Surgery 02/06/22   Referring Physician Elana Warren MD    Patient/Family Therapy Goal Statement (OT) to get back home    Additional Occupational Profile Info/Pertinent History of Current Problem PMHx hyperlipidemia, migraine w/ fever, presenting w/ generalized  weakness and L. sided weakness Stroke workup so far negative @ ED based on CT- no acute changes.    Existing Precautions/Restrictions fall   Cognitive Status Examination   Orientation Status person;place   Range of Motion Comprehensive   General Range of Motion no range of motion deficits identified   Strength Comprehensive (MMT)   General Manual Muscle Testing (MMT) Assessment upper extremity strength deficits identified   Upper Extremity (Manual Muscle Testing)   Upper Extremity: Manual Muscle Testing (MMT) left shoulder strength deficit;right shoulder strength deficit   Bed Mobility   Comment (Bed Mobility) Pt declined OOB act. or sitting EOB w/ OT d/t reporting she didnt feel well after her swallow study earlier.    Activities of Daily Living   BADL Assessment upper body dressing;lower body dressing;grooming   Upper Body Dressing Assessment   [place] Level (Upper Body Dressing) minimum assist (75% patient effort);verbal cues   Position (Upper Body Dressing) sitting up in bed   Lower Body Dressing Assessment   [place] Level (Lower Body Dressing) moderate assist (50% patient effort);verbal cues   Position (Lower Body Dressing) sitting up in bed   Grooming Assessment   [place] Level (Grooming) set up   Position (Grooming) sitting up in bed   Clinical Impression   Criteria for Skilled Therapeutic Interventions Met (OT) yes;skilled treatment is necessary   OT Diagnosis Pt presenting w/ increased weakness/decreased strength   OT Problem List-Impairments impacting ADL problems related to;activity tolerance impaired;balance;cognition;mobility;strength   Assessment of Occupational Performance 3-5 Performance Deficits   Identified Performance Deficits LB dressing, trnf/mobility, safety awareness, toileting    Planned Therapy Interventions (OT) ADL retraining;IADL retraining;balance training;cognition;strengthening   Clinical Decision Making Complexity (OT) moderate complexity   Therapy Frequency (OT) Daily    Predicted Duration of Therapy 5   Risk & Benefits of therapy have been explained evaluation/treatment results reviewed;patient   OT Discharge Planning    OT Discharge Recommendation (DC Rec) Transitional Care Facility;Home with assist   OT Rationale for DC Rec Unable to assess mobility/trnf assist level at this time pt adamently declining OOB act./EOB activity reporting she didnt feel well. Pt reported having increased weakness at home she lives w/ grandson in house.    Total Evaluation Time (Minutes)   Total Evaluation Time (Minutes) 5

## 2022-02-07 NOTE — PROGRESS NOTES
Occupational Therapy      02/07/22 1305   Quick Adds   Type of Visit Initial Occupational Therapy Evaluation   Living Environment   People in home grandchild(geetha)   Current Living Arrangements house   Home Accessibility stairs to enter home;stairs within home   Number of Stairs, Main Entrance 1   Stair Railings, Main Entrance railings safe and in good condition   Number of Stairs, Within Home, Primary 9  (9 Stairs upstairs/9 stairs downstairs- laundry downstairs )   Stair Railings, Within Home, Primary railings safe and in good condition   Transportation Anticipated family or friend will provide   Living Environment Comments Tub/shower has SC /GB, standard toilet no GB    Self-Care   Usual Activity Tolerance moderate   Current Activity Tolerance moderate   Regular Exercise No   Equipment Currently Used at Home none   Activity/Exercise/Self-Care Comment Ind. w/ dressing/toileting, pt stated she does not get in tub but sponge  bathes.    Instrumental Activities of Daily Living (IADL)   Previous Responsibilities meal prep;housekeeping;laundry;medication management;finances   Disability/Function   Hearing Difficulty or Deaf no   Wear Glasses or Blind yes   Vision Management glasses    Concentrating, Remembering or Making Decisions Difficulty no   Difficulty Communicating no   Walking or Climbing Stairs Difficulty yes   Walking or Climbing Stairs ambulation difficulty, assistance 1 person   Dressing/Bathing Difficulty yes   Dressing/Bathing bathing difficulty, requires equipment   Toileting issues no   Doing Errands Independently Difficulty (such as shopping) yes   Errands Management Portia assists w/ all grocery shopping    Fall history within last six months no   General Information   Onset of Illness/Injury or Date of Surgery 02/06/22   Referring Physician Elana Warren MD    Patient/Family Therapy Goal Statement (OT) to get back home    Additional Occupational Profile Info/Pertinent History of Current Problem  PMHx hyperlipidemia, migraine w/ fever, presenting w/ generalized weakness and L. sided weakness Stroke workup so far negative @ ED based on CT- no acute changes.    Existing Precautions/Restrictions fall   Cognitive Status Examination   Orientation Status person;place   Range of Motion Comprehensive   General Range of Motion no range of motion deficits identified   Strength Comprehensive (MMT)   General Manual Muscle Testing (MMT) Assessment upper extremity strength deficits identified   Upper Extremity (Manual Muscle Testing)   Upper Extremity: Manual Muscle Testing (MMT) left shoulder strength deficit;right shoulder strength deficit   Bed Mobility   Comment (Bed Mobility) Pt declined OOB act. or sitting EOB w/ OT d/t reporting she didnt feel well after her swallow study earlier.    Activities of Daily Living   BADL Assessment upper body dressing;lower body dressing;grooming   Upper Body Dressing Assessment   [place] Level (Upper Body Dressing) minimum assist (75% patient effort);verbal cues   Position (Upper Body Dressing) sitting up in bed   Lower Body Dressing Assessment   [place] Level (Lower Body Dressing) moderate assist (50% patient effort);verbal cues   Position (Lower Body Dressing) sitting up in bed   Grooming Assessment   [place] Level (Grooming) set up   Position (Grooming) sitting up in bed   Clinical Impression   Criteria for Skilled Therapeutic Interventions Met (OT) yes;skilled treatment is necessary   OT Diagnosis Pt presenting w/ increased weakness/decreased strength   OT Problem List-Impairments impacting ADL problems related to;activity tolerance impaired;balance;cognition;mobility;strength   Assessment of Occupational Performance 3-5 Performance Deficits   Identified Performance Deficits LB dressing, trnf/mobility, safety awareness, toileting    Planned Therapy Interventions (OT) ADL retraining;IADL retraining;balance training;cognition;strengthening   Clinical Decision Making  Complexity (OT) moderate complexity   Therapy Frequency (OT) Daily   Predicted Duration of Therapy 5   Risk & Benefits of therapy have been explained evaluation/treatment results reviewed;patient   OT Discharge Planning    OT Discharge Recommendation (DC Rec) Transitional Care Facility;Home with assist   OT Rationale for DC Rec Unable to assess mobility/trnf assist level at this time pt adamently declining OOB act./EOB activity reporting she didnt feel well. Pt reported having increased weakness at home she lives w/ grandson in house.    Total Evaluation Time (Minutes)   Total Evaluation Time (Minutes) 5

## 2022-02-07 NOTE — PHARMACY-ADMISSION MEDICATION HISTORY
Pharmacy Note - Admission Medication History    Pertinent Provider Information:      ______________________________________________________________________    Prior To Admission (PTA) med list completed and updated in EMR.       PTA Med List   Medication Sig Last Dose     acetaminophen-codeine (TYLENOL #3) 300-30 mg per tablet [ACETAMINOPHEN-CODEINE (TYLENOL #3) 300-30 MG PER TABLET] Take 1 tablet by mouth every 6 (six) hours as needed for pain. Past Week at Unknown time     ARTIFICIAL TEARS 1.4 % ophthalmic solution INSTILL 2 DROPS IN BOTH EYES AS NEEDED (Patient taking differently: Place 2 drops into both eyes every hour as needed ) Past Week at Unknown time     Aspirin-Caffeine 400-32 MG TABS Take 1 tablet by mouth daily as needed Past Week at Unknown time     cholecalciferol, vitamin D3, 50 mcg (2,000 unit) Tab [CHOLECALCIFEROL, VITAMIN D3, 50 MCG (2,000 UNIT) TAB] Take 1 tablet (2,000 Units total) by mouth daily. One tab daily 2/6/2022 at Unknown time     desonide (DESOWEN) 0.05 % cream [DESONIDE (DESOWEN) 0.05 % CREAM] Apply to affected area 2 times daily (Patient taking differently: Apply topically 2 times daily as needed ) Past Month at Unknown time     ferrous sulfate 325 (65 FE) MG tablet [FERROUS SULFATE 325 (65 FE) MG TABLET] Take 1 tablet (325 mg total) by mouth daily with breakfast. Past Month at Unknown time     multivitamin (DAILY-DAVID) per tablet [MULTIVITAMIN (DAILY-DAVID) PER TABLET] Take 1 tablet by mouth daily. Past Week at Unknown time     nortriptyline (PAMELOR) 25 MG capsule TAKE 2 CAPSULES BY MOUTH TWICE DAILY (Patient taking differently: Take 50 mg by mouth 2 times daily ) 2/6/2022 at AM     omeprazole (PRILOSEC) 40 MG DR capsule TAKE 1 CAPSULE(40 MG) BY MOUTH DAILY BEFORE BREAKFAST (Patient taking differently: Take 40 mg by mouth daily ) 2/6/2022 at AM     OXcarbazepine (TRILEPTAL) 150 MG tablet TAKE 3 TABLETS(450 MG) BY MOUTH AT BEDTIME (Patient taking differently: Take 450 mg by mouth At  Bedtime ) 2/5/2022 at PM     topiramate (TOPAMAX) 50 MG tablet TAKE 1 TABLET BY MOUTH EVERY DAY (Patient taking differently: Take 50 mg by mouth At Bedtime ) 2/5/2022 at PM       Information source(s): Patient and CareEverywhere/SureScripts  Method of interview communication: in-person    Summary of Changes to PTA Med List  New: aspirin-caffeine  Discontinued: quetiapine  Changed: none    Patient was asked about OTC/herbal products specifically.  PTA med list reflects this.    In the past week, patient estimated taking medication this percent of the time:  greater than 90%.    Allergies were reviewed, assessed, and updated with the patient.      Patient does not use any multi-dose medications prior to admission.    The information provided in this note is only as accurate as the sources available at the time of the update(s).    Thank you for the opportunity to participate in the care of this patient.    Rosalind Marino [facility]  2/7/2022 10:04 AM

## 2022-02-07 NOTE — PROGRESS NOTES
"Patient was admitted by my colleague earlier this morning.  Admission H&P reviewed. Agree with the assessments and plan.    76-year-old female with past medical history of hyperlipidemia, migraine who presents with fever, generalized weakness, and subjective left-sided weakness.  Stroke work-up so far negative at the ED based on CT and CTA, MRI still pending.  On work-up at ED she was found to to have pneumonia.        Generalized weakness  Tremors  -Generalized weakness intermittent episodes of slurred speech, reports left side more weak than the right.  -Initially stroke code was called at ED, then the deescalated  -CT and CT angiogram no acute changes or major vessel occlusions, also with no objective focal deficits on exam  -Patient declined MRI, she wants to be \" totally out\" just like when she had her colonoscopy, declined to try even with Ativan.  Can be tried in the morning if this is strongly needed after neurology eval.    -Neurochecks Q4 hrs, PT/OT, Monitor on tele, neurology consult     Community acquired pneumonia  -WBC 16.3, chest x-ray with right perihilar infiltrate  -Ceftriaxone and azithromycin, incentive spirometry        Iron deficiency anemia  -Resume oral iron, monitor Hgb serially         Diet: liquid; swallow eval    DVT Prophylaxis: Pneumatic Compression Devices  Abrams Catheter: Not present  Central Lines: None  Cardiac Monitoring: None  Code Status:  Full          Disposition Plan     Expected Discharge: 1-2 days  "

## 2022-02-07 NOTE — ED NOTES
Dr. Ledesma notified patient refused MRI and Ativan.    QUICK REFERENCE INFORMATION:  The ABCs of Providing the Annual Wellness Visit   CMS.gov Learning Network    Medicare Annual Wellness Visit  Chief Complaint   Patient presents with   • Medicare Wellness-subsequent   • Hypertension   • Hyperlipidemia   • Osteoporosis   • Heartburn       Subjective     Yancy Mcdonnell is a 75 y.o. female who presents for an Annual Wellness Visit. In addition, we addressed the following health issues:    Hypertension   This is a chronic problem. The current episode started more than 1 year ago. The problem is unchanged. The problem is controlled. Pertinent negatives include no blurred vision, chest pain, neck pain, palpitations or shortness of breath. Agents associated with hypertension include NSAIDs. Current antihypertension treatment includes angiotensin blockers. The current treatment provides moderate improvement. There are no compliance problems.    Hyperlipidemia   This is a chronic problem. The current episode started more than 1 year ago. Recent lipid tests were reviewed and are variable. Associated symptoms include myalgias. Pertinent negatives include no chest pain or shortness of breath. Current antihyperlipidemic treatment includes diet change. The current treatment provides moderate improvement of lipids. There are no compliance problems.    Osteoporosis   This is a chronic problem. The current episode started more than 1 year ago. Associated symptoms include arthralgias and myalgias. Pertinent negatives include no abdominal pain, chest pain, chills, congestion, coughing, diaphoresis, fatigue, fever, joint swelling, nausea, neck pain, numbness, rash, sore throat, swollen glands, vomiting or weakness.   Heartburn   She reports no abdominal pain, no chest pain, no choking, no coughing, no nausea, no sore throat or no wheezing. This is a chronic problem. The current episode started more than 1 year ago. The problem occurs rarely. The problem has been gradually improving. The  symptoms are aggravated by certain foods. Pertinent negatives include no fatigue or weight loss. She has tried a PPI for the symptoms. The treatment provided significant relief.   Leg Pain & Swelling  Tripped a month ago. Landed on left knee. Left leg is swollen. Pain radiates down into lower leg. She has a history of DVT in the right leg when she had fractured that leg several years ago. No fever or chills. No warmth in the leg. She has a venous ultrasound on 7/22/19 that showed acute left lower extremity superficial thrombophlebitis in the greater saphenous (below knee), all other left sided veins appeared normal.      PMH, PSH, SocHx, FamHx, Allergies, and Medications: Reviewed and updated in the Visit Navigator.     Outpatient Medications Prior to Visit   Medication Sig Dispense Refill   • albuterol sulfate HFA (VENTOLIN HFA) 108 (90 Base) MCG/ACT inhaler Two inhalations every 4-6 hours as needed for SOA.     • alendronate (FOSAMAX) 70 MG tablet TAKE 1 TABLET BY MOUTH ONCE A WEEK. TAKE IN THE MORNING ON AN EMPTY STOMACH WITH 8 OUNCES OF WATER. STAY UPRIGHT 12 tablet 0   • Calcium Carbonate-Vitamin D (CALCIUM 500 + D PO) Take 2 tablets by mouth Daily.     • CHELATED MAGNESIUM PO Take 1 tablet by mouth Daily.     • famciclovir (FAMVIR) 500 MG tablet TAKE 3 TABLETS BY MOUTH AS A SINGLE DOSE AT FIRST SIGN OF COLD SORE 3 tablet 0   • fluticasone (FLONASE) 50 MCG/ACT nasal spray 2 sprays into the nostril(s) as directed by provider Daily. INTO EACH NOSTRIL     • Glucosamine-Chondroitin (CIDAFLEX PO) Take 2 tablets by mouth Daily.     • LORATADINE PO Take 1 tablet by mouth Daily.     • losartan (COZAAR) 50 MG tablet Take 50 mg by mouth Daily.     • lysine ([facility] LYSINE) 500 MG tablet Take 1 tablet by mouth Daily As Needed.     • Multiple Vitamin (MULTI-VITAMIN DAILY) tablet Take 1 tablet by mouth Daily.     • Naproxen Sodium (ALEVE) 220 MG capsule Take 2 capsules by mouth Daily.     • omeprazole (priLOSEC) 20 MG capsule  Take 20 mg by mouth Daily. TAKE ONE CAPSULE BY MOUTH EVERY AM ON EMPTY STOMACH, 30 MINUTES BEFORE EATING OR  TAKING OTHER MEDICAITON     • Vitamin D, Cholecalciferol, 1000 units capsule Take 1 capsule by mouth Daily.     • cefdinir (OMNICEF) 300 MG capsule Take 300 mg by mouth 2 (Two) Times a Day. one capsule twice for 10 days     • Cranberry (AZO CRANBERRY GUMMIES) 500 MG chewable tablet Chew 1 tablet Daily.       No facility-administered medications prior to visit.        Patient Active Problem List   Diagnosis   • Allergic rhinitis   • Lung nodule   • Dyslipidemia   • History of thrombosis   • Hypertension   • Migraine headache   • Mitral valve insufficiency   • Osteoarthritis   • Osteopenia of multiple sites   • Rheumatoid arthritis (CMS/HCC)   • Spinal stenosis of cervical region   • Venous insufficiency   • Gastroesophageal reflux disease without esophagitis         Social:  See review in SnapShot activity and in SocHx section of Visit Navigator.    Health Risk Assessment:  The patient has completed a Health Risk Assessment and it has been reviewed.    Current Medical Providers:  Patient Care Team:  Munira So APRN as PCP - General (Nurse Practitioner)  Dr. AMITA Ross) (Ophthalmology)  Banet, Duane Edward, MD as Consulting Physician (Dermatology)  Edwin Banks MD as Consulting Physician (Otolaryngology)    Age-appropriate Screening Schedule:  Refer to the list below for future screening recommendations based on patient's age. Orders for these recommended tests are listed in the plan section. The patient has been provided with a written plan.    Health Maintenance   Topic Date Due   • ZOSTER VACCINE (1 of 2) 06/10/1994   • PNEUMOCOCCAL VACCINES (65+ LOW/MEDIUM RISK) (2 of 2 - PCV13) 06/02/2016   • MEDICARE ANNUAL WELLNESS  04/22/2019   • LIPID PANEL  04/23/2019   • INFLUENZA VACCINE  08/01/2019   • DXA SCAN  06/15/2020   • TDAP/TD VACCINES (2 - Td) 01/05/2027   • COLONOSCOPY  07/20/2027        Depression Screen:   PHQ-2/PHQ-9 Depression Screening 8/15/2019   Little interest or pleasure in doing things 0   Feeling down, depressed, or hopeless 0   Total Score 0       Functional and Cognitive Screening:  Functional & Cognitive Status 8/15/2019   Do you have difficulty preparing food and eating? No   Do you have difficulty bathing yourself, getting dressed or grooming yourself? No   Do you have difficulty using the toilet? No   Do you have difficulty moving around from place to place? No   Do you have trouble with steps or getting out of a bed or a chair? No   Current Diet Well Balanced Diet   Dental Exam Up to date   Eye Exam Up to date   Exercise (times per week) 0 times per week   Current Exercise Activities Include None   Do you need help using the phone?  No   Are you deaf or do you have serious difficulty hearing?  No   Do you need help with transportation? No   Do you need help shopping? No   Do you need help preparing meals?  No   Do you need help with housework?  No   Do you need help with laundry? No   Do you need help taking your medications? No   Do you need help managing money? No   Do you ever drive or ride in a car without wearing a seat belt? No   Have you felt unusual stress, anger or loneliness in the last month? No   Who do you live with? Spouse   If you need help, do you have trouble finding someone available to you? No   Have you been bothered in the last four weeks by sexual problems? No   Do you have difficulty concentrating, remembering or making decisions? No       Does the patient have evidence of cognitive impairment? No    ATTENTION  What is the year: correct (08/15/19 0900)  What is the month of the year: correct (08/15/19 0900)  What is the day of the week?: correct (08/15/19 0900)  What is the date?: correct (08/15/19 0900)  MEMORY  Repeat address three times, only score third attempt: Bebeto Vincent [address] [place] [address]: 7 (08/15/19 0900)  HOW MANY ANIMALS  DID THE PATIENT NAME  Verbal Fluency -- Animal Names (0-25): 22+ (08/15/19 0900)  CLOCK DRAWING  Clock Drawing: All Correct (08/15/19 0900)  MEMORY RECALL  Tell me what you remember about that name and address we were repeating at the beginnin (08/15/19 0900)  ACE TOTAL SCORE  Total ACE Score - <25/30 strongly suggests cognitive impairment; <21/30 almost certainly shows dementia: 30 (08/15/19 0900)    Advanced Care Planning:  Patient has an advance directive - a copy has been provided and is visible in patient header    Identification of Risk Factors:  Risk factors include: Breast Cancer/Mammogram Screening  Fall Risk  Immunizations Discussed/Encouraged (specific immunizations; Hepatitis A Vaccine/Series ).    Review of Systems   Constitutional: Negative for appetite change, chills, diaphoresis, fatigue, fever, unexpected weight gain and unexpected weight loss.   HENT: Negative for congestion, ear discharge, ear pain, hearing loss, mouth sores, nosebleeds, postnasal drip, rhinorrhea, sinus pressure, sneezing, sore throat, swollen glands and trouble swallowing.    Eyes: Negative for blurred vision, double vision, pain, discharge, redness and itching.   Respiratory: Negative for apnea, cough, choking, shortness of breath, wheezing and stridor.    Cardiovascular: Positive for leg swelling. Negative for chest pain and palpitations.   Gastrointestinal: Negative for abdominal distention, abdominal pain, anal bleeding, blood in stool, constipation, diarrhea, nausea, rectal pain, vomiting, GERD and indigestion.   Endocrine: Negative for cold intolerance, heat intolerance, polydipsia, polyphagia and polyuria.   Genitourinary: Negative for breast discharge, breast lump, breast pain, difficulty urinating, dysuria, flank pain, frequency, genital sores, hematuria, pelvic pain, urgency, urinary incontinence, vaginal bleeding, vaginal discharge and vaginal pain.   Musculoskeletal: Positive for arthralgias, back pain and  "myalgias. Negative for gait problem, joint swelling, neck pain and neck stiffness.   Skin: Negative for color change, rash and skin lesions.   Neurological: Negative for dizziness, tremors, seizures, syncope, speech difficulty, weakness, light-headedness, numbness, headache, memory problem and confusion.   Hematological: Negative for adenopathy. Does not bruise/bleed easily.   Psychiatric/Behavioral: Negative for self-injury, sleep disturbance, suicidal ideas, depressed mood and stress. The patient is not nervous/anxious.      Objective     Vitals:    08/15/19 0857   BP: 155/77   BP Location: Left arm   Patient Position: Sitting   Cuff Size: Adult   Pulse: 69   Resp: 16   Temp: 98 °F (36.7 °C)   TempSrc: Oral   SpO2: 99%   Weight: 75.3 kg (166 lb)   Height: 160 cm (63\")         08/15/19  0857   Weight: 75.3 kg (166 lb)     Body mass index is 29.41 kg/m².    Physical Exam   Constitutional: She is oriented to person, place, and time. She appears well-developed and well-nourished. No distress.   HENT:   Head: Normocephalic and atraumatic.   Right Ear: Tympanic membrane, external ear and ear canal normal. Tympanic membrane is not injected, not erythematous and not retracted.   Left Ear: Tympanic membrane, external ear and ear canal normal. Tympanic membrane is not injected, not erythematous and not retracted.   Nose: Nose normal. No mucosal edema or rhinorrhea. Right sinus exhibits no maxillary sinus tenderness and no frontal sinus tenderness. Left sinus exhibits no maxillary sinus tenderness and no frontal sinus tenderness.   Mouth/Throat: Uvula is midline, oropharynx is clear and moist and mucous membranes are normal. No oral lesions. No oropharyngeal exudate.   Eyes: Conjunctivae, EOM and lids are normal. Pupils are equal, round, and reactive to light. Right eye exhibits no discharge. Left eye exhibits no discharge.   Neck: Trachea normal and normal range of motion. Neck supple. No JVD present. Carotid bruit is not " present. No tracheal deviation present. No thyroid mass and no thyromegaly present.   Cardiovascular: Normal rate, regular rhythm, normal heart sounds and intact distal pulses. Exam reveals no gallop and no friction rub.   No murmur heard.  Pulses:       Dorsalis pedis pulses are 2+ on the right side, and 2+ on the left side.        Posterior tibial pulses are 2+ on the right side, and 2+ on the left side.   Pulmonary/Chest: Effort normal and breath sounds normal. No respiratory distress. She has no wheezes. She has no rales. Right breast exhibits no inverted nipple, no mass, no nipple discharge, no skin change and no tenderness. Left breast exhibits no inverted nipple, no mass, no nipple discharge, no skin change and no tenderness. Breasts are symmetrical.   Abdominal: Soft. Bowel sounds are normal. She exhibits no distension and no mass. There is no hepatosplenomegaly. There is no tenderness. No hernia.   Genitourinary: Rectum normal and vagina normal. Pelvic exam was performed with patient supine. There is no rash, tenderness or lesion on the right labia. There is no rash, tenderness or lesion on the left labia. Right adnexum displays no mass, no tenderness and no fullness. Left adnexum displays no mass, no tenderness and no fullness. No vaginal discharge found.   Genitourinary Comments: Cervix & uterus are surgically absent.   Musculoskeletal: Normal range of motion. She exhibits no edema or deformity.        Left knee: She exhibits swelling. She exhibits no ecchymosis. Tenderness found.        Legs:  Lymphadenopathy:     She has no cervical adenopathy.     She has no axillary adenopathy. No inguinal adenopathy noted on the right or left side.   Neurological: She is alert and oriented to person, place, and time. She has normal strength and normal reflexes. No cranial nerve deficit or sensory deficit. Gait normal.   Skin: Skin is warm and dry. No lesion and no rash noted. She is not diaphoretic.   Psychiatric:  She has a normal mood and affect. Her speech is normal and behavior is normal. Judgment and thought content normal. Cognition and memory are normal.   Vitals reviewed.      Office Visit on 08/15/2019   Component Date Value Ref Range Status   • Color 08/15/2019 Yellow  Yellow, Straw, Dark Yellow, Pallavi Final   • Clarity, UA 08/15/2019 Clear  Clear Final   • Specific Gravity  08/15/2019 1.015  1.005 - 1.030 Final   • pH, Urine 08/15/2019 7.0  5.0 - 8.0 Final   • Leukocytes 08/15/2019 Trace* Negative Final   • Nitrite, UA 08/15/2019 Negative  Negative Final   • Protein, POC 08/15/2019 Negative  Negative mg/dL Final   • Glucose, UA 08/15/2019 Negative  Negative, 1000 mg/dL (3+) mg/dL Final   • Ketones, UA 08/15/2019 Negative  Negative Final   • Urobilinogen, UA 08/15/2019 Normal  Normal Final   • Bilirubin 08/15/2019 Negative  Negative Final   • Blood, UA 08/15/2019 Negative  Negative Final   • WBC 08/15/2019 5.0  3.4 - 10.8 x10E3/uL Final   • RBC 08/15/2019 3.99  3.77 - 5.28 x10E6/uL Final   • Hemoglobin 08/15/2019 11.0* 11.1 - 15.9 g/dL Final   • Hematocrit 08/15/2019 35.5  34.0 - 46.6 % Final   • MCV 08/15/2019 89  79 - 97 fL Final   • MCH 08/15/2019 27.6  26.6 - 33.0 pg Final   • MCHC 08/15/2019 31.0* 31.5 - 35.7 g/dL Final   • RDW 08/15/2019 15.1  12.3 - 15.4 % Final   • Platelets 08/15/2019 402  150 - 450 x10E3/uL Final   • Neutrophil Rel % 08/15/2019 59  Not Estab. % Final   • Lymphocyte Rel % 08/15/2019 29  Not Estab. % Final   • Monocyte Rel % 08/15/2019 10  Not Estab. % Final   • Eosinophil Rel % 08/15/2019 2  Not Estab. % Final   • Basophil Rel % 08/15/2019 0  Not Estab. % Final   • Neutrophils Absolute 08/15/2019 2.9  1.4 - 7.0 x10E3/uL Final   • Lymphocytes Absolute 08/15/2019 1.5  0.7 - 3.1 x10E3/uL Final   • Monocytes Absolute 08/15/2019 0.5  0.1 - 0.9 x10E3/uL Final   • Eosinophils Absolute 08/15/2019 0.1  0.0 - 0.4 x10E3/uL Final   • Basophils Absolute 08/15/2019 0.0  0.0 - 0.2 x10E3/uL Final   •  Immature Granulocyte Rel % 08/15/2019 0  Not Estab. % Final   • Immature Grans Absolute 08/15/2019 0.0  0.0 - 0.1 x10E3/uL Final   • Glucose 08/15/2019 86  65 - 99 mg/dL Final   • BUN 08/15/2019 15  8 - 27 mg/dL Final   • Creatinine 08/15/2019 0.79  0.57 - 1.00 mg/dL Final   • eGFR Non  Am 08/15/2019 73  >59 mL/min/1.73 Final   • eGFR African Am 08/15/2019 85  >59 mL/min/1.73 Final   • BUN/Creatinine Ratio 08/15/2019 19  12 - 28 Final   • Sodium 08/15/2019 143  134 - 144 mmol/L Final   • Potassium 08/15/2019 5.0  3.5 - 5.2 mmol/L Final   • Chloride 08/15/2019 107* 96 - 106 mmol/L Final   • Total CO2 08/15/2019 23  20 - 29 mmol/L Final   • Calcium 08/15/2019 9.2  8.7 - 10.3 mg/dL Final   • Total Protein 08/15/2019 6.6  6.0 - 8.5 g/dL Final   • Albumin 08/15/2019 4.0  3.5 - 4.8 g/dL Final   • Globulin 08/15/2019 2.6  1.5 - 4.5 g/dL Final   • A/G Ratio 08/15/2019 1.5  1.2 - 2.2 Final   • Total Bilirubin 08/15/2019 0.6  0.0 - 1.2 mg/dL Final   • Alkaline Phosphatase 08/15/2019 110  39 - 117 IU/L Final   • AST (SGOT) 08/15/2019 17  0 - 40 IU/L Final   • ALT (SGPT) 08/15/2019 16  0 - 32 IU/L Final         Assessment/Plan   Patient Self-Management and Personalized Health Advice  The patient has been provided with information about: the relationship between weight and GERD and fall prevention and preventive services including:   · Annual Wellness Visit (AWV)  · Influenza Vaccine and Administration  · Screening Mammography .    Discussed the patient's BMI with her. The BMI is in the acceptable range.    Diagnoses and all orders for this visit:    1. Encounter for general adult medical examination with abnormal findings (Primary)    2. Hypertension, unspecified type  Comments:  Elevated. Return in 2 weeks for nurse visit/recheck.   Orders:  -     POC Urinalysis Dipstick, Automated  -     CBC & Differential  -     Comprehensive Metabolic Panel    3. Thrombophlebitis leg  Comments:  Continue edema - will have her see  Venous/Vascular Specialist.   Encouraged use of compression stockings.  Orders:  -     Ambulatory Referral to Vascular Surgery    4. Venous insufficiency  -     Ambulatory Referral to Vascular Surgery    5. History of thrombosis  -     Ambulatory Referral to Vascular Surgery    6. Acute pain of left knee  -     XR Knee 1 or 2 View Left; Future    7. Seasonal allergic rhinitis due to pollen  Comments:  Stable with current treatment plan.     8. Osteopenia of multiple sites  Comments:  Repeat DEXA in 4/2021.   Encouraged weight bearnig exercise and increased calcium intake.     9. Dyslipidemia  Comments:  Continue healthy eating and regular physica activity.     10. Gastroesophageal reflux disease without esophagitis  Comments:  Discussed risks/benefits of PPI use. She is only using it PRN (rarely).    11. Lung nodule  Comments:  Stable since Dec 2017. Will repeat Chest CT again in Jan 2020 - this will likely be the last CT she will need.    12. Non-rheumatic mitral regurgitation  Comments:  Mild on 2017 echo. Asymptomatic.     13. Rheumatoid arthritis, involving unspecified site, unspecified rheumatoid factor presence (CMS/HCC)  Comments:  She reports past evaluation by Rheumatologist - no specific treatment recommendations. She declines further evaluation or referral.     14. Screening for breast cancer  -     Mammo Screening Digital Tomosynthesis Bilateral With CAD; Future    15. Screening for vaginal cancer        Orders:  Orders Placed This Encounter   Procedures   • Mammo Screening Digital Tomosynthesis Bilateral With CAD   • XR Knee 1 or 2 View Left   • Comprehensive Metabolic Panel   • Ambulatory Referral to Vascular Surgery   • POC Urinalysis Dipstick, Automated   • CBC & Differential       Follow Up:  No Follow-up on file.     An After Visit Summary and PPPS with all of these plans were given to the patient.

## 2022-02-07 NOTE — ED NOTES
Pt has not voided, purewick placed on night shift. Pt states she has the urge to go, but is not able to void. Bladder distended, scan >850cc. Dr. Warren paged.

## 2022-02-07 NOTE — PROGRESS NOTES
"Speech-Language Pathology: Video Swallow Study     02/07/22 1100   General Information   Onset of Illness/Injury or Date of Surgery 02/06/22   Referring Physician Scottie Ledesma MD   Patient/Family Therapy Goal Statement (SLP) Patient would like to eat/drink.   Pertinent History of Current Problem Per MD Note: \"Julisa Chaney is a 76-year-old female with past medical history of hyperlipidemia, migraine who presents with generalized weakness.  Reports she has been having progressive generalized weakness since about 3 days ago with associated poor appetite and diarrhea which has now resolved.  Since the morning of 2/6 noticed that she still feels weak but more so on the left upper extremity and presented to the ED.  Also reported to have intermittent episodes of slurred speech and forgetfulness over the last few days.  Noted to be febrile at the ED but patient did not notice that.  She denies any recent vision changes, chest pain, cough, shortness of breath or palpitations.  Initially stroke code was called at ED with work-up negative for any acute findings, and based on clinical presentation MRI was planned and stroke call was escalated.\" Neurology consult pending. CXR\" mild hazy right perihilar infiltrate.   General Observations VFSS completed 2/18/2015 with recommendation for regular diet and thin liquids with use of chin tuck. At that time patient preferred \"nectar thick\" liquids but cleared for thin liquids. Patient lives in house with 20 year old grandson as caregiver; she reported each fixing their own meals. Patient reported no swallowing concerns and possibly has not been eating due to UE weakness and \"jerking\" movements. Patient reported no new word-finding difficulty, but difficulty forming words at times due to facial pain and tongue weakness. Patient reported intermittent facial pain (associated with certain movements; e.g. puckering lips). Patient reported her father had Ansley Gehrig's disease. "   General Swallowing Observations   Swallowing Evaluation Videofluoroscopic swallow study (VFSS)   VFSS Evaluation   VFSS Textures Trialed thin liquids;mildly thick liquids;solid foods   VFSS Eval: Thin Liquid Texture Trial   Mode of Presentation, Thin Liquid cup   Order of Presentation 1,2,4   Preparatory Phase WFL   Oral Phase, Thin Liquid Premature pharyngeal entry   Pharyngeal Phase, Thin Liquid Delayed swallow reflex   Rosenbek's Penetration Aspiration Scale: Thin Liquid Trial Results 8 - contrast passes glottis, visible subglottic residue remains, absent patient response (aspiration)   Response to Aspiration absent response, silent aspiration   Successful Strategies Trialed During Procedure, Thin Liquid chin tuck   VFSS Eval: Mildly Thick Liquids   Mode of Presentation cup;self-fed   Order of Presentation 5   Preparatory Phase WFL   Oral Phase Premature pharyngeal entry   Pharyngeal Phase Delayed swallow reflex   Rosenbek's Penetration Aspiration Scale 1 - no aspiration, contrast does not enter airway   VFSS Evaluation: Solid Food Texture Trial   Mode of Presentation, Solid self-fed   Order of Presentation barium coated cracker   Preparatory Phase   (slow but functional d/t lack of dentitoin)   Oral Phase, Solid WFL   Pharyngeal Phase, Solid WFL   Rosenbek's Penetration Aspiration Scale: Solid Food Trial Results 1 - no aspiration, contrast does not enter airway   Swallowing Recommendations   Diet Consistency Recommendations minced & moist (level 5);thin liquids (level 0)   Postural Recommendations chin tuck   Comment, Swallowing Recommendations Videofluoroscopic Swallow Study completed. Patient had silent aspiration with trace amount of thin and deep penetration with thin. Oral motor function functional and oral phase functional. Swallow response was delayed and epiglottic inversion was complete.  Mild stasis occurred with all textures that cleared to trace amount with subsequent swallows . Hyolaryngeal  elevation was reduced and hyolaryngeal excursion was reduced. Recommend diet of minced and moist(lack of dentition) and thin liquids with small sip/chin tuck strategy.   General Therapy Interventions   Planned Therapy Interventions Dysphagia Treatment   Dysphagia treatment Instruction of safe swallow strategies   SLP Therapy Assessment/Plan   Criteria for Skilled Therapeutic Interventions Met (SLP Eval) yes;treatment indicated   SLP Diagnosis dysphagia   Rehab Potential (SLP Eval) good, to achieve stated therapy goals   Therapy Frequency (SLP Eval) daily   Predicted Duration of Therapy Intervention (SLP Eval) LOS   Comment, Therapy Assessment/Plan (SLP) Teach/reinforce small sip with chin tuck when drinking thin.    Total Evaluation Time   Total Evaluation Time (Minutes) 10

## 2022-02-07 NOTE — ED NOTES
I assisted pt. To eat her food. She ate very little eggs and about half her mashed potatoes with butter and a few sips of water.

## 2022-02-07 NOTE — ED NOTES
Patient refused MRI, refused Ativan as well to assist with MRI.  Writer paged Phalen, charge nurse notified.

## 2022-02-07 NOTE — TELEPHONE ENCOUNTER
"Grandson Johnathon called. Asked to get pt's verbal consent to talk w/ him. Johnathon gave phone to his father. Explained we need pt to give a verbal consent for us to talk w/ them. He was very hesitant to do this. Said \"she doesn't want to come in\". Told him we do not know if she needs to come in yet we just need pt's \"ok\" to talk to a family member about her care. Rather than get consent he handed phone to pt then left. Pt confused, unable to complete triage. Said her only concern was low appetite. Advised pt to make appt w/ her PCP.   "

## 2022-02-07 NOTE — ED NOTES
Pt ate 1/2 portion mashed potatoes and gravy, full portion of cream of wheat, full portion of vanilla pudding, and good fluid intake for lunch.

## 2022-02-07 NOTE — ED PROVIDER NOTES
EMERGENCY DEPARTMENT ENCOUNTER      NAME: Julisa Chaney  AGE: 76 year old female  YOB: 1945  MRN: [id number]  EVALUATION DATE & TIME: 2022 11:53 PM    PCP: Mara Murillo    ED PROVIDER: Perez Park D.O.      Chief Complaint   Patient presents with     Generalized Weakness     Decreased Appetite     Slurred Speech       FINAL IMPRESSION:  1. Left-sided weakness        ED COURSE & MEDICAL DECISION MAKIN:56 PM I met with the patient to gather history and to perform my initial exam. I discussed the plan for care while in the Emergency Department.  12:13 AM I spoke with the neurologist, Dr. Lees, regarding the patient. Will plan on admission at this time.  12:39 AM spoke with Radiology Dr. Vazquez about CT results  12:46 AM spoke with Stroke Neurology.  Recommends de-escalation of stroke code, and MR.  1:00 AM patient care turned over to Dr. Ledesma for admission      Pertinent Labs & Imaging studies reviewed. (See chart for details)  76 year old female presents to the Emergency Department for evaluation of a stroke-leg symptoms.  She did have some weakness on her left side, left facial droop, and left sided difficulty with finger-nose.  CT and CTA are both negative and stroke neurology was reconsulted.  We decided to de-escalate the stroke code at this time and MRI was ordered.  Additional concern for this patient due to fever, and with this current medical status, I am concerned that is potential there could be infectious process actually causing her symptoms.  The CT imaging the head and neck did reveal an area that is concerning for possible pneumonia, therefore blood cultures were ordered, and Rocephin and azithromycin were both given.  At MRI is still pending, but will admit patient due to suspected PNA with concern the infection could be causing her symptoms.      At the conclusion of the encounter I discussed the results of all of the tests and the disposition. The questions were  "answered. The patient or family acknowledged understanding and was agreeable with the care plan.      HPI    Patient information was obtained from: Patient and Grandson    Use of : N/A       Julisa Chaney is a 76 year old female who presents via private car with Grandson with a history of hyperlipidemia, iron deficiency anemia, migraines, and trigeminal neuralgia for evaluation of stroke symptoms.     Per Grandson, the patient currently lives at home with grandson as her caretaker. She has been experiencing generalized weakness, intermittent speech difficulty, left sided facial droop, tremors , decreased appetite, pale skin since this morning. He states her symptoms have \"been going on all this past days\" but it has gotten worse today. Patient's tremors are worse when she tries to lift her extremities that is worse on the left side. She has not eaten for the past day and half despite her grandsons efforts. Patient can walk normally at baseline but now she is too weak to get up. She seems to understand commands and statement for the most part but not as well as baseline. Patient resulted to have a low grade fever when checked here in the ED upon arrival. She is noted to have a \"mild\" contrast allergy.     Per Patient, she denies chest pain, nausea, vomiting, diarrhea, recent falls, or any other symptoms at this time      REVIEW OF SYSTEMS  Constitutional:  Denies chills, weight loss. Pos for generalized weakness, fever(low grade), decreased appetite  Eyes:  No pain, discharge, redness  HENT:  Denies sore throat, ear pain, congestion  Respiratory: No SOB, wheeze or cough  Cardiovascular:  No CP, palpitations  GI:  Denies abdominal pain, nausea, vomiting, diarrhea  : Denies dysuria, hematuria  Musculoskeletal:  Denies any new muscle/joint pain, swelling or loss of function.  Skin:  Denies rash. Pos for pallor  Neurologic:  Denies headache, focal weakness or sensory changes. Pos for left sided facial " droop, tremors to extremities, intermittent speech difficulty  Lymph: Denies swollen nodes    All other systems negative unless noted in HPI.    PAST MEDICAL HISTORY:  Past Medical History:   Diagnosis Date     High cholesterol      Migraines      Sepsis (H) 8/10/2020       PAST SURGICAL HISTORY:  Past Surgical History:   Procedure Laterality Date     HYSTERECTOMY       [place] ESOPHAGOGASTRODUODENOSCOPY TRANSORAL DIAGNOSTIC N/A 8/13/2020    Procedure: ESOPHAGOGASTRODUODENOSCOPY (EGD);  Surgeon: Nathan Smalls MD;  Location: [facility];  Service: Gastroenterology     [place] ESOPHAGOGASTRODUODENOSCOPY TRANSORAL DIAGNOSTIC N/A 8/14/2020    Procedure: ESOPHAGOGASTRODUODENOSCOPY (EGD), PYLORIC DILATION;  Surgeon: Nathan Smalls MD;  Location: [facility];  Service: Gastroenterology     [facility] COLONOSCOPY W/WO BRUSH/WASH N/A 8/13/2020    Procedure: COLONOSCOPY WITH POLYPECTOMY;  Surgeon: Nathan Smalls MD;  Location: [facility];  Service: Gastroenterology         CURRENT MEDICATIONS:    Current Facility-Administered Medications   Medication     aspirin (ASA) chewable tablet 324 mg     azithromycin (ZITHROMAX) 500 mg in sodium chloride 0.9 % 250 mL intermittent infusion     cefTRIAXone (ROCEPHIN) 1 g vial to attach to  mL bag for ADULTS or NS 50 mL bag for PEDS     Current Outpatient Medications   Medication     acetaminophen-codeine (TYLENOL #3) 300-30 mg per tablet     acetaminophen-codeine (TYLENOL #3) 300-30 mg per tablet     acetaminophen-codeine (TYLENOL #3) 300-30 MG tablet     ARTIFICIAL TEARS 1.4 % ophthalmic solution     cholecalciferol, vitamin D3, 50 mcg (2,000 unit) Tab     desonide (DESOWEN) 0.05 % cream     ferrous sulfate 325 (65 FE) MG tablet     multivitamin (DAILY-DAVID) per tablet     nortriptyline (PAMELOR) 25 MG capsule     omeprazole (PRILOSEC) 40 MG capsule     omeprazole (PRILOSEC) 40 MG capsule     omeprazole (PRILOSEC) 40 MG DR capsule     OXcarbazepine (TRILEPTAL) 150 MG  "tablet     QUEtiapine (SEROQUEL) 25 MG tablet     QUEtiapine (SEROQUEL) 25 MG tablet     QUEtiapine (SEROQUEL) 25 MG tablet     topiramate (TOPAMAX) 50 MG tablet         ALLERGIES:  Allergies   Allergen Reactions     Contrast [Iohexol] Rash     Noticed during 2020 admission. Received IV contrast on      Chocolate Headache     Mirtazapine Other (See Comments)     \"Needles to the face\"     Penicillins Rash       FAMILY HISTORY:  Family History   Problem Relation Age of Onset     Cancer Father      Testicular cancer Grandchild 17.00     Breast Cancer Mother      Breast Cancer Sister      Breast Cancer Maternal Aunt      Breast Cancer Sister        SOCIAL HISTORY:  Social History     Socioeconomic History     Marital status:      Spouse name: Not on file     Number of children: Not on file     Years of education: Not on file     Highest education level: Not on file   Occupational History     Not on file   Tobacco Use     Smoking status: Former Smoker     Packs/day: 1.00     Years: 50.00     Pack years: 50.00     Quit date: 2014     Years since quittin.2     Smokeless tobacco: Never Used   Substance and Sexual Activity     Alcohol use: No     Drug use: No     Sexual activity: Not on file   Other Topics Concern     Not on file   Social History Narrative    Lives alone in the house, relay in TV dinner  grandkids help with house maintenance  Daughter lives close by.  Mara Murillo MD 2018 1:49 PM       Social Determinants of Health     Financial Resource Strain: Not on file   Food Insecurity: Not on file   Transportation Needs: Not on file   Physical Activity: Not on file   Stress: Not on file   Social Connections: Not on file   Intimate Partner Violence: Not on file   Housing Stability: Not on file       VITALS:  Patient Vitals for the past 24 hrs:   BP Temp Temp src Pulse Resp SpO2 Height Weight   22 0115 129/56 -- -- 81 -- 97 % -- --   22 0114 -- -- -- -- 21 97 % -- --   22 " "0102 -- -- -- -- 21 -- -- --   02/07/22 0100 125/62 -- -- 83 -- -- -- --   02/07/22 0050 -- -- -- -- 22 -- -- --   02/07/22 0045 117/57 -- -- 82 -- 97 % -- --   02/06/22 2339 119/54 100.4  F (38  C) Oral 84 16 96 % 1.702 m (5' 7\") 51.3 kg (113 lb)       PHYSICAL EXAM    VITAL SIGNS: /56   Pulse 81   Temp 100.4  F (38  C) (Oral)   Resp 21   Ht 1.702 m (5' 7\")   Wt 51.3 kg (113 lb)   SpO2 97%   BMI 17.70 kg/m      General Appearance: Well-appearing, well-nourished, no acute distress   Head:  Normocephalic, without obvious abnormality, atraumatic  Eyes:  PERRL, conjunctiva/corneas clear, EOM's intact,  ENT:  Lips, mucosa, and tongue normal, membranes are moist without pallor  Neck:  Normal ROM, symmetrical, trachea midline    Cardio:  Regular rate and rhythm, no murmur, rub or gallop, 2+ pulses symmetric in all extremities  Pulm:  Clear to auscultation bilaterally, respirations unlabored,  Abdomen:  Soft, non-tender, no rebound or guarding.  Musculoskeletal: Full ROM, no edema, no cyanosis, good ROM of major joints  Integument:  Warm, Dry, No erythema, No rash.    Neurologic: Patient is alert and oriented ×3.  Facial droop on left.  Speech is normal.  Visual fields are full.  Cranial nerves II through XII are grossly intact. Motor tone is 5/5 and equal in both upper and lower extremities.  Bilateral symmetric sensation grossly intact upper and lower.  Neg finger-nose positive on left. Neg heel-shin.    Psychiatric:  Affect normal, Judgment normal, Mood normal.        National Institutes of Health Stroke Scale  Exam Interval: Baseline   Score    Level of consciousness: (0)   Alert, keenly responsive    LOC questions: (0)   Answers both questions correctly    LOC commands: (0)   Performs both tasks correctly    Best gaze: (0)   Normal    Visual: (0)   No visual loss    Facial palsy: (1)   Minor paralysis (flat nasolabial fold, smile asymmetry)    Motor arm (left): (1)   Drift    Motor arm (right): (0)   No " drift    Motor leg (left): (1)   Drift    Motor leg (right): (1)   Drift    Limb ataxia: (1)   Present in one limb    Sensory: (0)   Normal- no sensory loss    Best language: (0)   Normal- no aphasia    Dysarthria: (0)   Normal    Extinction and inattention: (0)   No abnormality        Total Score:  5         LABS  Results for orders placed or performed during the hospital encounter of 02/06/22 (from the past 24 hour(s))   Glucose by meter   Result Value Ref Range    GLUCOSE BY METER POCT 134 (H) 70 - 99 mg/dL   CBC with platelets   Result Value Ref Range    WBC Count 16.3 (H) 4.0 - 11.0 10e3/uL    RBC Count 4.41 3.80 - 5.20 10e6/uL    Hemoglobin 10.3 (L) 11.7 - 15.7 g/dL    Hematocrit 35.6 35.0 - 47.0 %    MCV 81 78 - 100 fL    MCH 23.4 (L) 26.5 - 33.0 pg    MCHC 28.9 (L) 31.5 - 36.5 g/dL    RDW 18.1 (H) 10.0 - 15.0 %    Platelet Count 382 150 - 450 10e3/uL   Basic metabolic panel   Result Value Ref Range    Sodium 139 136 - 145 mmol/L    Potassium 3.7 3.5 - 5.0 mmol/L    Chloride 106 98 - 107 mmol/L    Carbon Dioxide (CO2) 25 22 - 31 mmol/L    Anion Gap 8 5 - 18 mmol/L    Urea Nitrogen 12 8 - 28 mg/dL    Creatinine 0.73 0.60 - 1.10 mg/dL    Calcium 8.7 8.5 - 10.5 mg/dL    Glucose 138 (H) 70 - 125 mg/dL    GFR Estimate 85 >60 mL/min/1.73m2   Troponin I   Result Value Ref Range    Troponin I <0.01 0.00 - 0.29 ng/mL   [place] Draw    Narrative    The following orders were created for panel order [place] Draw.  Procedure                               Abnormality         Status                     ---------                               -----------         ------                     Extra Red Top Tube[[id number]]                               In process                   Please view results for these tests on the individual orders.   Partial thromboplastin time   Result Value Ref Range    aPTT 26 22 - 38 Seconds   INR   Result Value Ref Range    INR 1.05 0.85 - 1.15   Lactic acid whole blood   Result Value Ref Range     Lactic Acid 0.9 0.7 - 2.0 mmol/L   Creatinine POCT   Result Value Ref Range    Creatinine POCT 0.6 0.6 - 1.1 mg/dL    GFR, ESTIMATED POCT >60 >60 mL/min/1.73m2   CTA Head Neck with Contrast    Narrative    EXAM: CTA  HEAD NECK WITH CONTRAST  LOCATION: [facility]  DATE/TIME: 2/7/2022 12:02 AM    INDICATION: Code Stroke to evaluate for potential thrombolysis and thrombectomy.  PLEASE READ IMMEDIATELY. Left-sided weakness and slurred speech.  COMPARISON: Head CT 02/06/2019.  CONTRAST: 75 ml Isovue 370.  TECHNIQUE: Head and neck CT angiogram with IV contrast. Noncontrast head CT followed by axial helical CT images of the head and neck vessels obtained during the arterial phase of intravenous contrast administration. Axial 2D reconstructed images and   multiplanar 3D MIP reconstructed images of the head and neck vessels were performed by the technologist. Dose reduction techniques were used. All stenosis measurements made according to NASCET criteria unless otherwise specified.    FINDINGS:   NONCONTRAST HEAD CT:   INTRACRANIAL CONTENTS: No intracranial hemorrhage, extraaxial collection, or mass effect. No CT evidence of acute infarct. Mild presumed chronic small vessel ischemic changes. Mild generalized volume loss. No hydrocephalus. There are small chronic   infarcts in the bilateral cerebellar hemispheres.     VISUALIZED ORBITS/SINUSES/MASTOIDS: No intraorbital abnormality. No paranasal sinus mucosal disease. No middle ear or mastoid effusion.    BONES/SOFT TISSUES: No acute abnormality.    HEAD CTA:  ANTERIOR CIRCULATION: No major vessel intraluminal filling defect, flow-limiting stenosis or occlusion. Mild to moderate atheromatous changes in the carotid siphons. Within the limits of CTA, no convincing aneurysm or high flow vascular lesion. Standard   [place] anatomy.    POSTERIOR CIRCULATION: No major vessel intraluminal filling defect, flow-limiting stenosis or occlusion. There are  mild scattered atheromatous changes. Dominant right and smaller left vertebral artery contribute to a normal basilar artery.     DURAL VENOUS SINUSES: Expected enhancement of the major dural venous sinuses.    NECK CTA:  RIGHT CAROTID: No measurable stenosis or dissection.    LEFT CAROTID: No measurable stenosis or dissection.    VERTEBRAL ARTERIES: No focal stenosis or dissection. Dominant right and smaller left vertebral arteries.    AORTIC ARCH: Vascular variant aortic arch origin left vertebral artery.  No significant stenosis at the origin of the great vessels.    NONVASCULAR STRUCTURES: Right upper lobe airspace infiltrate. Underlying mild centrilobular emphysema.      Impression    IMPRESSION:   HEAD CT:  1.  No CT evidence of acute intracranial hemorrhage, mass or recent infarct.  2.  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  3.  Mild volume loss and presumed chronic small vessel ischemic changes.    HEAD CTA:   1.  No major vessel acute thromboembolism, flow-limiting stenosis or occlusion.  2.  Evidence of mild intracranial atherosclerosis.    NECK CTA:  1.  Normal neck CTA.  2.  Evidence of a right upper lobe infiltrate. Correlate for pneumonia.  3.  Underlying mild centrilobular emphysema.    Results were called to Dr. Park at 12:39 AM on 02/07/2022.   XR Chest Port 1 View    Narrative    EXAM: XR CHEST PORT 1 VIEW  LOCATION: [facility]  DATE/TIME: 2/7/2022 12:51 AM    INDICATION: Fever.  COMPARISON: 8/8/2020.      Impression    IMPRESSION: Question some mild hazy right perihilar infiltrate with a mild or low-grade pneumonitis not excluded. Left lung is clear. Normal heart size and pulmonary vascularity. Osteopenia. Remainder unremarkable.         RADIOLOGY  XR Chest Port 1 View   Preliminary Result   IMPRESSION: Question some mild hazy right perihilar infiltrate with a mild or low-grade pneumonitis not excluded. Left lung is clear. Normal heart size and pulmonary  vascularity. Osteopenia. Remainder unremarkable.      CTA Head Neck with Contrast   Final Result   IMPRESSION:    HEAD CT:   1.  No CT evidence of acute intracranial hemorrhage, mass or recent infarct.   2.  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   3.  Mild volume loss and presumed chronic small vessel ischemic changes.      HEAD CTA:    1.  No major vessel acute thromboembolism, flow-limiting stenosis or occlusion.   2.  Evidence of mild intracranial atherosclerosis.      NECK CTA:   1.  Normal neck CTA.   2.  Evidence of a right upper lobe infiltrate. Correlate for pneumonia.   3.  Underlying mild centrilobular emphysema.      Results were called to Dr. Park at 12:39 AM on 02/07/2022.      MR Brain w/o Contrast    (Results Pending)          EKG:    Rate: 82 bpm  Rhythm: Normal Sinus Rhythm  Axis: Normal  Interval: Normal  Conduction: Normal  QRS: Normal  ST: Normal  T-wave: Normal  QT: Not prolonged  Comparison EKG: No significant change compared to 5 August 2020  Impression:  No acute ischemic change   I have independently reviewed and interpreted today's EKG, pending Cardiologist read        MEDICATIONS GIVEN IN THE EMERGENCY:  Medications   aspirin (ASA) chewable tablet 324 mg (has no administration in time range)   cefTRIAXone (ROCEPHIN) 1 g vial to attach to  mL bag for ADULTS or NS 50 mL bag for PEDS (has no administration in time range)   azithromycin (ZITHROMAX) 500 mg in sodium chloride 0.9 % 250 mL intermittent infusion (has no administration in time range)   diphenhydrAMINE (BENADRYL) injection 25 mg (25 mg Intravenous Given 2/7/22 0013)   methylPREDNISolone sodium succinate (solu-MEDROL) injection 125 mg (125 mg Intravenous Given 2/7/22 0012)   iopamidol (ISOVUE-370) solution 75 mL (75 mLs Intravenous Given 2/7/22 0040)       NEW PRESCRIPTIONS STARTED AT TODAY'S ER VISIT  New Prescriptions    No medications on file        Perez Park D.O.  Emergency Medicine  [facility]  [facility] EMERGENCY DEPARTMENT  [address] [place] [id number]  [phone]  Dept: [phone]       Perez Park DO  02/07/22 0122

## 2022-02-07 NOTE — CONSULTS
"    [facility]    Stroke Telephone Note    I was called by Perez Park on 02/07/22 regarding patient Julisa Chaney. The patient is a 76 year old female with HL, migraines, who is presenting with mild left hemiparesis.  She has had a general decline over the past few days    CT/CTA unrevealing and stroke code de-escalated    Stroke Code Data (for stroke code without tele)  Stroke code activated 02/06/22   2350   Stroke provider first response  02/06/22   2350            Last known normal 02/06/22   0900        Time of discovery   (or onset of symptoms) 02/06/22   0900   Head CT read by Stroke Neuro Dr/Provider 02/07/22   0029   Was stroke code de-escalated? Yes 02/07/22 0030  patient is outside emergent treatment time parameters       Imaging Findings   As above.  Scattered athro, microvascular disease    Thrombolytic Treatment   Not given due to unclear or unfavorable risk-benefit profile for extended window thrombolysis beyond the conventional 4.5 hour time window.    Endovascular Treatment  Not initiated due to absence of proximal vessel occlusion    Impression:.   Probable lacunar stroke, however, patient is normotensive with Temp 100.4.  Would assess for alternative etiologies    Recommendations   1. MRI Brain  2. Toxic/metabolic/infectious work-up    My recommendations are based on the information provided over the phone by Julisa Chaney's in-person providers. They are not intended to replace the clinical judgment of her in-person providers. I was not requested to personally see or examine the patient at this time.    Perry Ambrosio MD, MS  Vascular Neurology  To page me or covering stroke neurology team member, click here: [facility]COM   Choose \"On Call\" tab at top, then search dropdown box for \"Neurology Adult\", select location, press Enter, then look for stroke/neuro ICU/telestroke.      I personally spent a total of 25 minutes on February 7, 2022 consulting with her  " medical providers and coordinating care.  This includes personally reviewing the patient's medical record including diagnostic testing, neuroimaging, and laboratory studies.

## 2022-02-07 NOTE — ED PROVIDER NOTES
"EMERGENCY DEPARTMENT SIGN OUT NOTE        ED COURSE AND MEDICAL DECISION MAKING  In brief, Julisa Chaney is a 76 year old female who initially presented generalized weakness, speech difficulty and left facial droop concerning for CVA.  She was incidentally noted to have a community acquired pneumonia.      12:59 AM Patient was signed out to me by Dr Christiansen.  At time of sign out, disposition was pending MR imaging, COVID-19 testing.  0230-tylenol with codeine 1 tablet administered for pain.  Declines MRI at this time.    FINAL IMPRESSION    1. Left-sided weakness    2. Pneumonia of right upper lobe due to infectious organism      BRIEF HPI  Julisa Chaney is a 76 year old female who presents to this ED for the evaluation of stroke symptoms. Per grandson, patient has been experiencing generalized weakness, intermittent speech difficulty, left sided facial droop, tremors decreased appetite, pale skin since this morning. He states her symptoms have \"been going on all this past days\" but have gotten worse today. Patient can normally walk at baseline, but is now to weak to get up. She seems to understand commands and statements for the most part, but not as well at baseline.    ED MEDS  Medications   azithromycin (ZITHROMAX) 500 mg in sodium chloride 0.9 % 250 mL intermittent infusion (500 mg Intravenous New Bag 2/7/22 0225)   LORazepam (ATIVAN) injection 1 mg (1 mg Intravenous Not Given 2/7/22 0238)   diphenhydrAMINE (BENADRYL) injection 25 mg (25 mg Intravenous Given 2/7/22 0013)   methylPREDNISolone sodium succinate (solu-MEDROL) injection 125 mg (125 mg Intravenous Given 2/7/22 0012)   iopamidol (ISOVUE-370) solution 75 mL (75 mLs Intravenous Given 2/7/22 0040)   aspirin (ASA) chewable tablet 324 mg (324 mg Oral Given 2/7/22 0122)   cefTRIAXone (ROCEPHIN) 1 g vial to attach to  mL bag for ADULTS or NS 50 mL bag for PEDS (0 g Intravenous Stopped 2/7/22 0223)   acetaminophen-codeine (TYLENOL #3) 300-30 MG per " tablet 1 tablet (1 tablet Oral Given 2/7/22 0239)       LAB  Labs Ordered and Resulted from Time of ED Arrival to Time of ED Departure   CBC WITH PLATELETS - Abnormal       Result Value    WBC Count 16.3 (*)     RBC Count 4.41      Hemoglobin 10.3 (*)     Hematocrit 35.6      MCV 81      MCH 23.4 (*)     MCHC 28.9 (*)     RDW 18.1 (*)     Platelet Count 382     BASIC METABOLIC PANEL - Abnormal    Sodium 139      Potassium 3.7      Chloride 106      Carbon Dioxide (CO2) 25      Anion Gap 8      Urea Nitrogen 12      Creatinine 0.73      Calcium 8.7      Glucose 138 (*)     GFR Estimate 85     GLUCOSE BY METER - Abnormal    GLUCOSE BY METER POCT 134 (*)    HEPATIC FUNCTION PANEL - Abnormal    Bilirubin Total 0.2      Bilirubin Direct <0.1      Protein Total 6.8      Albumin 3.3 (*)     Alkaline Phosphatase 138 (*)     AST 14      ALT 17     PARTIAL THROMBOPLASTIN TIME - Normal    aPTT 26     INR - Normal    INR 1.05     TROPONIN I - Normal    Troponin I <0.01     LACTIC ACID WHOLE BLOOD - Normal    Lactic Acid 0.9     ISTAT CREATININE POCT - Normal    Creatinine POCT 0.6      GFR, ESTIMATED POCT >60     INFLUENZA A/B & SARS-COV2 PCR MULTIPLEX - Normal    Influenza A PCR Negative      Influenza B PCR Negative      SARS CoV2 PCR Negative     GLUCOSE MONITOR NURSING POCT   ROUTINE UA WITH MICROSCOPIC REFLEX TO CULTURE   BLOOD CULTURE   BLOOD CULTURE         RADIOLOGY    XR Chest Port 1 View   Final Result   IMPRESSION: Question some mild hazy right perihilar infiltrate with a mild or low-grade pneumonitis not excluded. Left lung is clear. Normal heart size and pulmonary vascularity. Osteopenia. Remainder unremarkable.      CTA Head Neck with Contrast   Final Result   IMPRESSION:    HEAD CT:   1.  No CT evidence of acute intracranial hemorrhage, mass or recent infarct.   2.  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   3.  Mild volume loss and presumed chronic small vessel ischemic changes.      HEAD CTA:    1.   No major vessel acute thromboembolism, flow-limiting stenosis or occlusion.   2.  Evidence of mild intracranial atherosclerosis.      NECK CTA:   1.  Normal neck CTA.   2.  Evidence of a right upper lobe infiltrate. Correlate for pneumonia.   3.  Underlying mild centrilobular emphysema.      Results were called to Dr. Park at 12:39 AM on 02/07/2022.      MR Brain w/o Contrast    (Results Pending)       I, Monty Taveras, am serving as a scribe to document services personally performed by Sherley Wei MD, based on my observations and the provider's statements to me.  I, Sherley Wei MD, attest that Monty Taveras is acting in a scribe capacity, has observed my performance of the services and has documented them in accordance with my direction.      Sherley Wei MD  Emergency Medicine  [facility] EMERGENCY DEPARTMENT  [address] [place] [id number] [phone]       Sherley Wei MD  02/07/22 0317

## 2022-02-08 ENCOUNTER — APPOINTMENT (OUTPATIENT)
Dept: RADIOLOGY | Facility: HOSPITAL | Age: 77
DRG: 178 | End: 2022-02-08
Payer: COMMERCIAL

## 2022-02-08 ENCOUNTER — APPOINTMENT (OUTPATIENT)
Dept: OCCUPATIONAL THERAPY | Facility: HOSPITAL | Age: 77
DRG: 178 | End: 2022-02-08
Payer: COMMERCIAL

## 2022-02-08 ENCOUNTER — APPOINTMENT (OUTPATIENT)
Dept: PHYSICAL THERAPY | Facility: HOSPITAL | Age: 77
DRG: 178 | End: 2022-02-08
Attending: STUDENT IN AN ORGANIZED HEALTH CARE EDUCATION/TRAINING PROGRAM
Payer: COMMERCIAL

## 2022-02-08 ENCOUNTER — APPOINTMENT (OUTPATIENT)
Dept: NEUROLOGY | Facility: HOSPITAL | Age: 77
DRG: 178 | End: 2022-02-08
Attending: PSYCHIATRY & NEUROLOGY
Payer: COMMERCIAL

## 2022-02-08 ENCOUNTER — APPOINTMENT (OUTPATIENT)
Dept: CT IMAGING | Facility: HOSPITAL | Age: 77
DRG: 178 | End: 2022-02-08
Attending: PSYCHIATRY & NEUROLOGY
Payer: COMMERCIAL

## 2022-02-08 LAB
AMMONIA PLAS-SCNC: 21 UMOL/L (ref 11–35)
ANION GAP SERPL CALCULATED.3IONS-SCNC: 9 MMOL/L (ref 5–18)
ATRIAL RATE - MUSE: 82 BPM
BASOPHILS # BLD AUTO: 0 10E3/UL (ref 0–0.2)
BASOPHILS NFR BLD AUTO: 0 %
BUN SERPL-MCNC: 8 MG/DL (ref 8–28)
C REACTIVE PROTEIN LHE: 11 MG/DL (ref 0–0.8)
CALCIUM SERPL-MCNC: 7.8 MG/DL (ref 8.5–10.5)
CHLORIDE BLD-SCNC: 112 MMOL/L (ref 98–107)
CO2 SERPL-SCNC: 22 MMOL/L (ref 22–31)
CREAT SERPL-MCNC: 0.58 MG/DL (ref 0.6–1.1)
DIASTOLIC BLOOD PRESSURE - MUSE: 57 MMHG
ENTEROCOCCUS FAECALIS: NOT DETECTED
ENTEROCOCCUS FAECIUM: NOT DETECTED
EOSINOPHIL # BLD AUTO: 0.3 10E3/UL (ref 0–0.7)
EOSINOPHIL NFR BLD AUTO: 4 %
ERYTHROCYTE [DISTWIDTH] IN BLOOD BY AUTOMATED COUNT: 18 % (ref 10–15)
GFR SERPL CREATININE-BSD FRML MDRD: >90 ML/MIN/1.73M2
GLUCOSE BLD-MCNC: 94 MG/DL (ref 70–125)
HCT VFR BLD AUTO: 30.7 % (ref 35–47)
HGB BLD-MCNC: 9.1 G/DL (ref 11.7–15.7)
IMM GRANULOCYTES # BLD: 0 10E3/UL
IMM GRANULOCYTES NFR BLD: 0 %
INTERPRETATION ECG - MUSE: NORMAL
LISTERIA SPECIES (DETECTED/NOT DETECTED): NOT DETECTED
LYMPHOCYTES # BLD AUTO: 1.5 10E3/UL (ref 0.8–5.3)
LYMPHOCYTES NFR BLD AUTO: 17 %
MAGNESIUM SERPL-MCNC: 1.8 MG/DL (ref 1.8–2.6)
MCH RBC QN AUTO: 23.3 PG (ref 26.5–33)
MCHC RBC AUTO-ENTMCNC: 29.6 G/DL (ref 31.5–36.5)
MCV RBC AUTO: 79 FL (ref 78–100)
MONOCYTES # BLD AUTO: 0.4 10E3/UL (ref 0–1.3)
MONOCYTES NFR BLD AUTO: 5 %
NEUTROPHILS # BLD AUTO: 6.9 10E3/UL (ref 1.6–8.3)
NEUTROPHILS NFR BLD AUTO: 74 %
NRBC # BLD AUTO: 0 10E3/UL
NRBC BLD AUTO-RTO: 0 /100
P AXIS - MUSE: 63 DEGREES
PLATELET # BLD AUTO: 391 10E3/UL (ref 150–450)
POTASSIUM BLD-SCNC: 3.1 MMOL/L (ref 3.5–5)
POTASSIUM BLD-SCNC: 3.4 MMOL/L (ref 3.5–5)
POTASSIUM BLD-SCNC: 3.6 MMOL/L (ref 3.5–5)
PR INTERVAL - MUSE: 166 MS
PROCALCITONIN SERPL-MCNC: 0.03 NG/ML (ref 0–0.49)
QRS DURATION - MUSE: 72 MS
QT - MUSE: 358 MS
QTC - MUSE: 418 MS
R AXIS - MUSE: 59 DEGREES
RBC # BLD AUTO: 3.9 10E6/UL (ref 3.8–5.2)
SODIUM SERPL-SCNC: 143 MMOL/L (ref 136–145)
STAPHYLOCOCCUS AUREUS: NOT DETECTED
STAPHYLOCOCCUS EPIDERMIDIS: NOT DETECTED
STAPHYLOCOCCUS LUGDUNENSIS: NOT DETECTED
STAPHYLOCOCCUS SPECIES: NOT DETECTED
STREPTOCOCCUS AGALACTIAE: NOT DETECTED
STREPTOCOCCUS ANGINOSUS GROUP: NOT DETECTED
STREPTOCOCCUS PNEUMONIAE: NOT DETECTED
STREPTOCOCCUS PYOGENES: NOT DETECTED
STREPTOCOCCUS SPECIES: NOT DETECTED
SYSTOLIC BLOOD PRESSURE - MUSE: 117 MMHG
T AXIS - MUSE: 58 DEGREES
T4 FREE SERPL-MCNC: 0.77 NG/DL (ref 0.7–1.8)
VENTRICULAR RATE- MUSE: 82 BPM
VIT B12 SERPL-MCNC: 162 PG/ML (ref 213–816)
WBC # BLD AUTO: 9.2 10E3/UL (ref 4–11)

## 2022-02-08 PROCEDURE — 99233 SBSQ HOSP IP/OBS HIGH 50: CPT | Performed by: PSYCHIATRY & NEUROLOGY

## 2022-02-08 PROCEDURE — 95816 EEG AWAKE AND DROWSY: CPT | Mod: 26 | Performed by: PSYCHIATRY & NEUROLOGY

## 2022-02-08 PROCEDURE — 84132 ASSAY OF SERUM POTASSIUM: CPT | Performed by: INTERNAL MEDICINE

## 2022-02-08 PROCEDURE — 82607 VITAMIN B-12: CPT | Performed by: PSYCHIATRY & NEUROLOGY

## 2022-02-08 PROCEDURE — 120N000001 HC R&B MED SURG/OB

## 2022-02-08 PROCEDURE — 86140 C-REACTIVE PROTEIN: CPT

## 2022-02-08 PROCEDURE — 82140 ASSAY OF AMMONIA: CPT | Performed by: PSYCHIATRY & NEUROLOGY

## 2022-02-08 PROCEDURE — 250N000011 HC RX IP 250 OP 636: Performed by: INTERNAL MEDICINE

## 2022-02-08 PROCEDURE — 250N000011 HC RX IP 250 OP 636: Performed by: STUDENT IN AN ORGANIZED HEALTH CARE EDUCATION/TRAINING PROGRAM

## 2022-02-08 PROCEDURE — 84145 PROCALCITONIN (PCT): CPT | Performed by: INTERNAL MEDICINE

## 2022-02-08 PROCEDURE — 70450 CT HEAD/BRAIN W/O DYE: CPT

## 2022-02-08 PROCEDURE — 97110 THERAPEUTIC EXERCISES: CPT | Mod: GO

## 2022-02-08 PROCEDURE — 258N000003 HC RX IP 258 OP 636: Performed by: STUDENT IN AN ORGANIZED HEALTH CARE EDUCATION/TRAINING PROGRAM

## 2022-02-08 PROCEDURE — 99222 1ST HOSP IP/OBS MODERATE 55: CPT

## 2022-02-08 PROCEDURE — 36415 COLL VENOUS BLD VENIPUNCTURE: CPT | Performed by: STUDENT IN AN ORGANIZED HEALTH CARE EDUCATION/TRAINING PROGRAM

## 2022-02-08 PROCEDURE — 85004 AUTOMATED DIFF WBC COUNT: CPT | Performed by: STUDENT IN AN ORGANIZED HEALTH CARE EDUCATION/TRAINING PROGRAM

## 2022-02-08 PROCEDURE — 97162 PT EVAL MOD COMPLEX 30 MIN: CPT | Mod: GP

## 2022-02-08 PROCEDURE — 36415 COLL VENOUS BLD VENIPUNCTURE: CPT | Performed by: INTERNAL MEDICINE

## 2022-02-08 PROCEDURE — 97530 THERAPEUTIC ACTIVITIES: CPT | Mod: GP

## 2022-02-08 PROCEDURE — 82310 ASSAY OF CALCIUM: CPT | Performed by: STUDENT IN AN ORGANIZED HEALTH CARE EDUCATION/TRAINING PROGRAM

## 2022-02-08 PROCEDURE — 250N000013 HC RX MED GY IP 250 OP 250 PS 637: Performed by: PSYCHIATRY & NEUROLOGY

## 2022-02-08 PROCEDURE — 95816 EEG AWAKE AND DROWSY: CPT

## 2022-02-08 PROCEDURE — 97535 SELF CARE MNGMENT TRAINING: CPT | Mod: GO

## 2022-02-08 PROCEDURE — 83735 ASSAY OF MAGNESIUM: CPT | Performed by: INTERNAL MEDICINE

## 2022-02-08 PROCEDURE — 250N000013 HC RX MED GY IP 250 OP 250 PS 637: Performed by: INTERNAL MEDICINE

## 2022-02-08 PROCEDURE — 99233 SBSQ HOSP IP/OBS HIGH 50: CPT | Performed by: INTERNAL MEDICINE

## 2022-02-08 PROCEDURE — 71045 X-RAY EXAM CHEST 1 VIEW: CPT

## 2022-02-08 PROCEDURE — 250N000013 HC RX MED GY IP 250 OP 250 PS 637: Performed by: STUDENT IN AN ORGANIZED HEALTH CARE EDUCATION/TRAINING PROGRAM

## 2022-02-08 RX ORDER — DIPHENHYDRAMINE HCL 25 MG
25 TABLET ORAL EVERY 6 HOURS PRN
Status: DISCONTINUED | OUTPATIENT
Start: 2022-02-08 | End: 2022-02-12 | Stop reason: HOSPADM

## 2022-02-08 RX ORDER — ASPIRIN 81 MG/1
81 TABLET, CHEWABLE ORAL DAILY
Status: DISCONTINUED | OUTPATIENT
Start: 2022-02-09 | End: 2022-02-12 | Stop reason: HOSPADM

## 2022-02-08 RX ORDER — POTASSIUM CHLORIDE 1500 MG/1
20 TABLET, EXTENDED RELEASE ORAL ONCE
Status: COMPLETED | OUTPATIENT
Start: 2022-02-08 | End: 2022-02-08

## 2022-02-08 RX ORDER — MAGNESIUM 200 MG
1000 TABLET ORAL DAILY
Status: DISCONTINUED | OUTPATIENT
Start: 2022-02-08 | End: 2022-02-12 | Stop reason: HOSPADM

## 2022-02-08 RX ADMIN — AZITHROMYCIN MONOHYDRATE 500 MG: 500 INJECTION, POWDER, LYOPHILIZED, FOR SOLUTION INTRAVENOUS at 22:12

## 2022-02-08 RX ADMIN — POTASSIUM CHLORIDE 20 MEQ: 1500 TABLET, EXTENDED RELEASE ORAL at 17:00

## 2022-02-08 RX ADMIN — PANTOPRAZOLE SODIUM 40 MG: 20 TABLET, DELAYED RELEASE ORAL at 06:31

## 2022-02-08 RX ADMIN — ACETAMINOPHEN AND CODEINE PHOSPHATE 1 TABLET: 300; 30 TABLET ORAL at 05:32

## 2022-02-08 RX ADMIN — FERROUS SULFATE TAB 325 MG (65 MG ELEMENTAL FE) 325 MG: 325 (65 FE) TAB at 08:41

## 2022-02-08 RX ADMIN — ACETAMINOPHEN AND CODEINE PHOSPHATE 1 TABLET: 300; 30 TABLET ORAL at 11:34

## 2022-02-08 RX ADMIN — TOPIRAMATE 50 MG: 25 TABLET, FILM COATED ORAL at 22:11

## 2022-02-08 RX ADMIN — Medication 1000 MCG: at 22:11

## 2022-02-08 RX ADMIN — POTASSIUM CHLORIDE 20 MEQ: 1500 TABLET, EXTENDED RELEASE ORAL at 11:30

## 2022-02-08 RX ADMIN — NORTRIPTYLINE HYDROCHLORIDE 50 MG: 50 CAPSULE ORAL at 08:41

## 2022-02-08 RX ADMIN — VANCOMYCIN HYDROCHLORIDE 1000 MG: 1 INJECTION, SOLUTION INTRAVENOUS at 18:50

## 2022-02-08 RX ADMIN — OXCARBAZEPINE 450 MG: 300 TABLET, FILM COATED ORAL at 22:11

## 2022-02-08 RX ADMIN — SODIUM CHLORIDE, PRESERVATIVE FREE: 5 INJECTION INTRAVENOUS at 07:40

## 2022-02-08 RX ADMIN — NORTRIPTYLINE HYDROCHLORIDE 50 MG: 50 CAPSULE ORAL at 20:17

## 2022-02-08 RX ADMIN — ACETAMINOPHEN AND CODEINE PHOSPHATE 1 TABLET: 300; 30 TABLET ORAL at 20:30

## 2022-02-08 RX ADMIN — VANCOMYCIN HYDROCHLORIDE 1000 MG: 1 INJECTION, SOLUTION INTRAVENOUS at 00:40

## 2022-02-08 ASSESSMENT — ACTIVITIES OF DAILY LIVING (ADL)
ADLS_ACUITY_SCORE: 15

## 2022-02-08 NOTE — ED NOTES
"[facility] ED Handoff Report    ED Chief Complaint: intermittent slurred speech and weakness  ED Diagnosis:  (R53.1) Left-sided weakness      (J18.9) Pneumonia of right upper lobe due to infectious organism      PMH:    Past Medical History:   Diagnosis Date     High cholesterol      Migraines      Sepsis (H) 8/10/2020        Code Status:  Full Code     Falls Risk: Yes Band: Applied    Current Living Situation/Residence: lives with their son or daughter      Elimination Status: Catheter at this time     Activity Level: AT home patient does not use a cane or walker she ambulates without assistive devices.  Per pt she has not been up during ER stay, unable how she would do ambulating.     Patients Preferred Language:  English     Needed: No    Vital Signs:  /57   Pulse 75   Temp 98.2  F (36.8  C) (Oral)   Resp 28   Ht 1.702 m (5' 7\")   Wt 51.3 kg (113 lb)   SpO2 100%   BMI 17.70 kg/m       Cardiac Rhythm: NSR     Pain Score: 0/10    Is the Patient Confused:  No    Last Food or Drink: 02/07/22 at 1830    Focused Assessment:  Pt currently is not having any issues with her speech.   Last stroke exam at 1900 pt not having previous speech or tremor.  Pt alert and oriented.    Tests Performed: Done: Labs and Imaging    Treatments Provided: Antibiotics     Family Dynamics/Concerns: No    Family Updated On Visitor Policy: Yes    Plan of Care Communicated to Family: Yes    Who Was Updated about Plan of Care: Zack son-in-law: she will update him when to room           Covid: symptomatic, negative    Additional Information: Minced soft diet. Does okay with thin liquids for drinking.     RN: Ashwini Tran   2/7/2022 7:40 PM       "

## 2022-02-08 NOTE — PROGRESS NOTES
02/08/22 0958   Quick Adds   Type of Visit Initial PT Evaluation   Living Environment   People in home grandchild(geetha)   Self-Care   Equipment Currently Used at Home none   General Information   Onset of Illness/Injury or Date of Surgery 02/06/22   Referring Physician Dr. Elana Warren   Patient/Family Therapy Goals Statement (PT) none stated    Pertinent History of Current Problem (include personal factors and/or comorbidities that impact the POC) admit L side weakness/ migraine/ tremors/ slurred speech    General Observations pt in bed sleeping awoke easily    Cognition   Orientation Status (Cognition) oriented x 4   Pain Assessment   Patient Currently in Pain Yes, see Vital Sign flowsheet  (L face pain , and low back pain )   Range of Motion (ROM)   ROM Comment BLEs WFL    Strength   Strength Comments LLE 4-/5, RLE 4/5   Bed Mobility   Impairments Contributing to Impaired Bed Mobility pain;decreased strength   Assistive Device (Bed Mobility) bed rails;other (see comments)  (HOB elevated )   Comment (Bed Mobility) CGA, pt reports increased facial pain and dizziness after sitting up, pt wanted to return to supine, rolling side to side SBA, maxAx2 for scooting up in bed    Transfers   Transfer Safety Comments unable due to dizziness while sitting EOB    Balance   Balance Comments sitting balance SBA    Clinical Impression   Criteria for Skilled Therapeutic Intervention yes, treatment indicated   PT Diagnosis (PT) decreased mobilty    Influenced by the following impairments pain, dizziness, weakness    Functional limitations due to impairments decreased endurence for activity    Clinical Presentation Evolving/Changing   Clinical Presentation Rationale per medival diagnosis    Clinical Decision Making (Complexity) moderate complexity   Therapy Frequency (PT) Daily   Predicted Duration of Therapy Intervention (days/wks) 7   Planned Therapy Interventions (PT) bed mobility training;gait training;transfer  training;strengthening;progressive activity/exercise   Anticipated Equipment Needs at Discharge (PT) other (see comments)  (will continue to assess)   Risk & Benefits of therapy have been explained evaluation/treatment results reviewed   PT Discharge Planning    PT Discharge Recommendation (DC Rec) Transitional Care Facility;home with assist;home with home care physical therapy   PT Rationale for DC Rec pt limited mobility    Total Evaluation Time   Total Evaluation Time (Minutes) 10

## 2022-02-08 NOTE — PLAN OF CARE
Problem: Adult Inpatient Plan of Care  Goal: Plan of Care Review  Outcome: Improving     Problem: Adult Inpatient Plan of Care  Goal: Absence of Hospital-Acquired Illness or Injury  Outcome: Improving  Intervention: Identify and Manage Fall Risk  Recent Flowsheet Documentation  Taken 2/7/2022 2142 by Satish Anne RN  Safety Promotion/Fall Prevention:   bed alarm on   clutter free environment maintained   activity supervised   room organization consistent   patient and family education   nonskid shoes/slippers when out of bed  Intervention: Prevent Skin Injury  Recent Flowsheet Documentation  Taken 2/7/2022 2142 by Satish Anne RN  Body Position: position changed independently     Problem: Risk for Delirium  Goal: Improved Sleep  Outcome: Improving   Pt came up to the unit around 2000 from ER, noted body to be pinkish and now with obvious rash, House medicine updated, and discussed with writer . MD will be coming to see pt. Pt Is a/o x 4. Cooperative with care and medications. PRN Tylenol 3 given for left side of the eye pain with effcet. Will continued to monitor.

## 2022-02-08 NOTE — CONSULTS
Consultation - [facility]  Julisa Chaney,  1945, MRN [id number]    [facility]  Left-sided weakness [R53.1]  Pneumonia of right upper lobe due to infectious organism [J18.9]    PCP: Mara Murillo, [phone]   Code status:  Full Code       Extended Emergency Contact Information  Primary Emergency Contact: Alissa Del Real   [facility]  Mobile Phone: [phone]  Relation: Daughter  Secondary Emergency Contact: Cesia Lu   [facility]  Home Phone: [phone]  Mobile Phone: [phone]  Relation: Sister       Assessment and Plan   Active Problems:    Left-sided weakness    Pneumonia of right upper lobe due to infectious organism    Impression: Weakness and balance problems, likely secondary to new stroke.    Fever leukocytosis and possible pulmonary infiltrate noted on admission, concerning for pneumonia.  However procalcitonin normal and white count normal today.  Additionally, patient not requiring supplemental oxygen so unclear if this really is pneumonitis.  CT scan of head and neck also showed right upper lobe infiltrate    Patient also found to aspirate, so aspiration pneumonia is a possibility.    Single positive blood culture with gram-positive cocci in clusters suspicious for contaminant is 3 other bottles are negative    Recommendations: I ordered a repeat chest x-ray.  If there is no infiltrate, would discontinue antimicrobial chemotherapy for pneumonitis.    Reasonably keep vancomycin on board until we give a little bit more time for the other blood cultures to incubate.    Further recommendations to follow clinical course.    Thank you for consulting [facility]    Roddy Morales MD  [phone]     Chief Complaint <principal problem not specified>       HPI   We have been requested by Dr. Warren to evaluate Julisa Chaney for concern for infection who is a 76 year old year old  female.    Patient is a 76-year-old woman with past medical history as below.    Briefly, she presented to the emergency department with weakness and balance problems.    In the ED, she was noted to have low-grade fever and white count of 16,000.  Chest x-ray was performed which shows possible right perihilar infiltrate although patient was quite rotated.    Patient was initially started on ceftriaxone and azithromycin developed diffuse erythroderma without itching or breathing problems.  Those antibiotics have been discontinued.    2 blood cultures were obtained on admission, and 1 bottle from 1 set is growing gram-positive cocci in clusters.    Today, patient states she feels better.  She is aware of the rash because other people tell her she has one but she is not pruritic and is not having any breathing difficulties.    She denies other symptoms.    She lives with her 21-year-old grandson.  She says they take care of each other.  She does have a pet kitten.     Medical History  Patient Active Problem List   Diagnosis     Osteopenia     Hyperlipidemia     Migraine Headache     Trigeminal neuralgia     Counseling regarding advanced directives and goals of care     Controlled substance agreement signed     Hypercalcemia     Anxiety     Chronic pain syndrome     Iron deficiency anemia due to chronic blood loss     Abnormal chest CT     Mild major depression (H)     Pyloric ulcer, chronic     Left-sided weakness     Pneumonia of right upper lobe due to infectious organism     Past Medical History:   Diagnosis Date     High cholesterol      Migraines      Sepsis (H) 8/10/2020    Surgical History  She  has a past surgical history that includes Colonoscopy w/wo Brush **Performed** (N/A, 8/13/2020); Pr Esophagogastroduodenoscopy Transoral Diagnostic (N/A, 8/13/2020); Hysterectomy; and Pr Esophagogastroduodenoscopy Transoral Diagnostic (N/A, 8/14/2020).   Social History  Reviewed, and she  reports that she quit smoking  "about 7 years ago. She has a 50.00 pack-year smoking history. She has never used smokeless tobacco. She reports that she does not drink alcohol and does not use drugs.   Allergies  Allergies   Allergen Reactions     Contrast [Iohexol] Rash     Noticed during 08/2020 admission. Received IV contrast on 8/5     Chocolate Headache     Mirtazapine Other (See Comments)     \"Needles to the face\"     Penicillins Rash    Family History  Noncontributory to current problem except as mentioned above    Psychosocial Needs  Social History     Social History Narrative    Lives alone in the house, relay in TV dinner  [employer] help with house maintenance  Daughter lives close by.  Mara Murillo MD 7/9/2018 1:49 PM       Additional psychosocial needs reviewed per nursing assessment.     Prior to Admission Medications   Medications Prior to Admission   Medication Sig Dispense Refill Last Dose     acetaminophen-codeine (TYLENOL #3) 300-30 mg per tablet [ACETAMINOPHEN-CODEINE (TYLENOL #3) 300-30 MG PER TABLET] Take 1 tablet by mouth every 6 (six) hours as needed for pain. 120 tablet 0 Past Week at Unknown time     ARTIFICIAL TEARS 1.4 % ophthalmic solution INSTILL 2 DROPS IN BOTH EYES AS NEEDED (Patient taking differently: Place 2 drops into both eyes every hour as needed ) 15 mL 0 Past Week at Unknown time     Aspirin-Caffeine 400-32 MG TABS Take 1 tablet by mouth daily as needed   Past Week at Unknown time     cholecalciferol, vitamin D3, 50 mcg (2,000 unit) Tab [CHOLECALCIFEROL, VITAMIN D3, 50 MCG (2,000 UNIT) TAB] Take 1 tablet (2,000 Units total) by mouth daily. One tab daily 90 tablet 4 2/6/2022 at Unknown time     desonide (DESOWEN) 0.05 % cream [DESONIDE (DESOWEN) 0.05 % CREAM] Apply to affected area 2 times daily (Patient taking differently: Apply topically 2 times daily as needed ) 60 g 1 Past Month at Unknown time     ferrous sulfate 325 (65 FE) MG tablet [FERROUS SULFATE 325 (65 FE) MG TABLET] Take 1 tablet (325 mg total) by " "mouth daily with breakfast. 30 tablet 0 Past Month at Unknown time     multivitamin (DAILY-DAVID) per tablet [MULTIVITAMIN (DAILY-DAVID) PER TABLET] Take 1 tablet by mouth daily. 100 tablet 3 Past Week at Unknown time     nortriptyline (PAMELOR) 25 MG capsule TAKE 2 CAPSULES BY MOUTH TWICE DAILY (Patient taking differently: Take 50 mg by mouth 2 times daily ) 360 capsule 1 2/6/2022 at AM     omeprazole (PRILOSEC) 40 MG DR capsule TAKE 1 CAPSULE(40 MG) BY MOUTH DAILY BEFORE BREAKFAST (Patient taking differently: Take 40 mg by mouth daily ) 90 capsule 1 2/6/2022 at AM     OXcarbazepine (TRILEPTAL) 150 MG tablet TAKE 3 TABLETS(450 MG) BY MOUTH AT BEDTIME (Patient taking differently: Take 450 mg by mouth At Bedtime ) 270 tablet 2 2/5/2022 at PM     topiramate (TOPAMAX) 50 MG tablet TAKE 1 TABLET BY MOUTH EVERY DAY (Patient taking differently: Take 50 mg by mouth At Bedtime ) 90 tablet 2 2/5/2022 at PM          Review of Systems:  Pertinent items are noted in HPI., otherwise all others negative. Physical Exam:  Temp:  [97.8  F (36.6  C)-98.4  F (36.9  C)] 97.8  F (36.6  C)  Pulse:  [61-78] 78  Resp:  [16-37] 18  BP: (102-132)/(52-70) 132/61  SpO2:  [95 %-100 %] 100 %    /61 (BP Location: Right arm)   Pulse 78   Temp 97.8  F (36.6  C) (Oral)   Resp 18   Ht 1.676 m (5' 6\")   Wt 52.2 kg (115 lb)   SpO2 100%   BMI 18.56 kg/m    General appearance: alert, appears stated age, cooperative and thin  Head: Normocephalic, without obvious abnormality, atraumatic  Eyes: EOMI, no icterus  Neck: no adenopathy and supple, symmetrical, trachea midline  Lungs: clear to auscultation bilaterally  Heart: RRR, no murmur  Abdomen: soft, non-tender; bowel sounds normal; no masses,  no organomegaly  Extremities: warm and dry  Skin: She has some patches of psoriasis which she says are longstanding.  She has diffuse erythroderma on torso and proximal extremities.  No actual welts or hives  Neurologic: Grossly normal  Psychiatric: " Normal       Pertinent Labs  Lab Results: personally reviewed.   WBC   Date/Time Value Ref Range Status   05/04/2021 12:05 PM 6.8 4.0 - 11.0 thou/uL Final   08/21/2020 10:53 AM 9.4 4.0 - 11.0 thou/uL Final   08/12/2020 07:16 AM 9.0 4.0 - 11.0 thou/uL Final     WBC Count   Date/Time Value Ref Range Status   02/08/2022 07:43 AM 9.2 4.0 - 11.0 10e3/uL Final   02/07/2022 12:11 AM 16.3 (H) 4.0 - 11.0 10e3/uL Final     Hemoglobin   Date/Time Value Ref Range Status   02/08/2022 07:43 AM 9.1 (L) 11.7 - 15.7 g/dL Final   02/07/2022 12:11 AM 10.3 (L) 11.7 - 15.7 g/dL Final   05/04/2021 12:05 PM 11.1 (L) 12.0 - 16.0 g/dL Final     Hematocrit   Date/Time Value Ref Range Status   02/08/2022 07:43 AM 30.7 (L) 35.0 - 47.0 % Final   02/07/2022 12:11 AM 35.6 35.0 - 47.0 % Final   05/04/2021 12:05 PM 37.7 35.0 - 47.0 % Final     Platelet Count   Date/Time Value Ref Range Status   02/08/2022 07:43  150 - 450 10e3/uL Final   02/07/2022 12:11  150 - 450 10e3/uL Final   05/04/2021 12:05  140 - 440 thou/uL Final        Sodium   Date/Time Value Ref Range Status   02/08/2022 07:43  136 - 145 mmol/L Final   02/07/2022 12:11  136 - 145 mmol/L Final   05/04/2021 12:05  136 - 145 mmol/L Final     Carbon Dioxide (CO2)   Date/Time Value Ref Range Status   02/08/2022 07:43 AM 22 22 - 31 mmol/L Final   02/07/2022 12:11 AM 25 22 - 31 mmol/L Final   05/04/2021 12:05 PM 26 22 - 31 mmol/L Final     Urea Nitrogen   Date/Time Value Ref Range Status   02/08/2022 07:43 AM 8 8 - 28 mg/dL Final   02/07/2022 12:11 AM 12 8 - 28 mg/dL Final   05/04/2021 12:05 PM 21 8 - 28 mg/dL Final     Results for SAVANACHELITA (MRN [id number]) as of 2/8/2022 10:25   Ref. Range 2/8/2022 07:43   Procalcitonin Latest Ref Range: 0.00 - 0.49 ng/mL 0.03        Pertinent Radiology  Radiology Results:     XR Chest Port 1 View    Result Date: 2/7/2022  EXAM: XR CHEST PORT 1 VIEW LOCATION: [facility] DATE/TIME: 2/7/2022  12:51 AM INDICATION: Fever. COMPARISON: 8/8/2020.     IMPRESSION: Question some mild hazy right perihilar infiltrate with a mild or low-grade pneumonitis not excluded. Left lung is clear. Normal heart size and pulmonary vascularity. Osteopenia. Remainder unremarkable.    CTA Head Neck with Contrast    Result Date: 2/7/2022  EXAM: CTA  HEAD NECK WITH CONTRAST LOCATION: [facility] DATE/TIME: 2/7/2022 12:02 AM INDICATION: Code Stroke to evaluate for potential thrombolysis and thrombectomy.  PLEASE READ IMMEDIATELY. Left-sided weakness and slurred speech. COMPARISON: Head CT 02/06/2019. CONTRAST: 75 ml Isovue 370. TECHNIQUE: Head and neck CT angiogram with IV contrast. Noncontrast head CT followed by axial helical CT images of the head and neck vessels obtained during the arterial phase of intravenous contrast administration. Axial 2D reconstructed images and multiplanar 3D MIP reconstructed images of the head and neck vessels were performed by the technologist. Dose reduction techniques were used. All stenosis measurements made according to NASCET criteria unless otherwise specified. FINDINGS: NONCONTRAST HEAD CT: INTRACRANIAL CONTENTS: No intracranial hemorrhage, extraaxial collection, or mass effect. No CT evidence of acute infarct. Mild presumed chronic small vessel ischemic changes. Mild generalized volume loss. No hydrocephalus. There are small chronic infarcts in the bilateral cerebellar hemispheres. VISUALIZED ORBITS/SINUSES/MASTOIDS: No intraorbital abnormality. No paranasal sinus mucosal disease. No middle ear or mastoid effusion. BONES/SOFT TISSUES: No acute abnormality. HEAD CTA: ANTERIOR CIRCULATION: No major vessel intraluminal filling defect, flow-limiting stenosis or occlusion. Mild to moderate atheromatous changes in the carotid siphons. Within the limits of CTA, no convincing aneurysm or high flow vascular lesion. Standard [place] anatomy. POSTERIOR CIRCULATION: No  major vessel intraluminal filling defect, flow-limiting stenosis or occlusion. There are mild scattered atheromatous changes. Dominant right and smaller left vertebral artery contribute to a normal basilar artery. DURAL VENOUS SINUSES: Expected enhancement of the major dural venous sinuses. NECK CTA: RIGHT CAROTID: No measurable stenosis or dissection. LEFT CAROTID: No measurable stenosis or dissection. VERTEBRAL ARTERIES: No focal stenosis or dissection. Dominant right and smaller left vertebral arteries. AORTIC ARCH: Vascular variant aortic arch origin left vertebral artery.  No significant stenosis at the origin of the great vessels. NONVASCULAR STRUCTURES: Right upper lobe airspace infiltrate. Underlying mild centrilobular emphysema.     IMPRESSION: HEAD CT: 1.  No CT evidence of acute intracranial hemorrhage, mass or recent infarct. 2.  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 3.  Mild volume loss and presumed chronic small vessel ischemic changes. HEAD CTA: 1.  No major vessel acute thromboembolism, flow-limiting stenosis or occlusion. 2.  Evidence of mild intracranial atherosclerosis. NECK CTA: 1.  Normal neck CTA. 2.  Evidence of a right upper lobe infiltrate. Correlate for pneumonia. 3.  Underlying mild centrilobular emphysema. Results were called to Dr. Park at 12:39 AM on 02/07/2022.    XR Video Swallow with SLP or OT    Result Date: 2/7/2022  EXAM: XR VIDEO SWALLOW WITH SLP OR OT LOCATION: [facility] DATE/TIME: 2/7/2022 11:35 AM INDICATION: Difficulty swallowing. COMPARISON: None. TECHNIQUE: Routine swallow study with speech pathology using multiple barium thicknesses. FINDINGS: FLUOROSCOPIC TIME: 1 minutes. NUMBER OF IMAGES: 0 Swallow study with Speech Pathology using multiple barium thicknesses. Trace amount of aspiration is seen with thin barium consistency. No other evidence of laryngeal penetration or aspiration is seen with other barium consistencies. Please  see dedicated Speech Pathology report for further details of examination.

## 2022-02-08 NOTE — PLAN OF CARE
Problem: Adult Inpatient Plan of Care  Goal: Plan of Care Review  2/7/2022 2221 by Satish Anne RN  Outcome: Improving     Problem: Adult Inpatient Plan of Care  Goal: Absence of Hospital-Acquired Illness or Injury  Intervention: Identify and Manage Fall Risk  Recent Flowsheet Documentation  Taken 2/8/2022 0437 by Satish Anne RN  Safety Promotion/Fall Prevention:   bed alarm on   clutter free environment maintained   activity supervised   room organization consistent   patient and family education   nonskid shoes/slippers when out of bed  Taken 2/7/2022 2347 by Satish Anne RN  Safety Promotion/Fall Prevention:   bed alarm on   clutter free environment maintained   activity supervised   room organization consistent   patient and family education   nonskid shoes/slippers when out of bed  Taken 2/7/2022 2142 by Satish Anne RN  Safety Promotion/Fall Prevention:   bed alarm on   clutter free environment maintained   activity supervised   room organization consistent   patient and family education   nonskid shoes/slippers when out of bed     Problem: Adult Inpatient Plan of Care  Goal: Optimal Comfort and Wellbeing  2/8/2022 0454 by Satish Anne RN  Outcome: Improving  2/7/2022 2221 by Satish Anne RN  Outcome: Improving     Problem: Risk for Delirium  Goal: Optimal Coping  2/8/2022 0454 by Satish Anne RN  Outcome: Improving  2/7/2022 2221 by Satish Anne RN  Outcome: Improving   VSS. Pt pain managed with PRN Tylenol 3. Continued on antibiotics. BC result was called in for Gram positive cocci and cluster, Family resident updated with new order. Will continued to monitor.

## 2022-02-08 NOTE — CONSULTS
Care Management Initial Consult    General Information  Assessment completed with: Julisa Patel  Type of CM/SW Visit: Initial Assessment    Primary Care Provider verified and updated as needed: Yes   Readmission within the last 30 days:        Reason for Consult: discharge planning  Advance Care Planning: Advance Care Planning Reviewed: other (comment) (has paperwork to be filled out)          Communication Assessment  Patient's communication style: spoken language (English or Bilingual)    Hearing Difficulty or Deaf: no   Wear Glasses or Blind: yes    Cognitive  Cognitive/Neuro/Behavioral: WDL  Level of Consciousness: alert  Arousal Level: opens eyes spontaneously  Orientation: oriented x 4  Mood/Behavior: calm,cooperative  Best Language: 0 - No aphasia  Speech: clear,spontaneous,logical    Living Environment:   People in home: grandchild(geetha)  Johnathon Egan  Current living Arrangements: house      Able to return to prior arrangements: yes       Family/Social Support:  Care provided by: self  Provides care for: no one  Marital Status:   Children,Other (specify) (grandson Johnathon)          Description of Support System: Supportive,Involved    Support Assessment: Adequate family and caregiver support,Adequate social supports,Patient communicates needs well met    Current Resources:   Patient receiving home care services: No     Community Resources: None  Equipment currently used at home: grab bar, tub/shower,shower chair  Supplies currently used at home: None    Employment/Financial:  Employment Status: retired        Financial Concerns:             Lifestyle & Psychosocial Needs:  Social Determinants of Health     Tobacco Use: Medium Risk     Smoking Tobacco Use: Former Smoker     Smokeless Tobacco Use: Never Used   Alcohol Use: Not on file   Financial Resource Strain: Not on file   Food Insecurity: Not on file   Transportation Needs: Not on file   Physical Activity: Not on file   Stress: Not on file    Social Connections: Not on file   Intimate Partner Violence: Not on file   Depression: Not at risk     PHQ-2 Score: 2   Housing Stability: Not on file       Functional Status:  Prior to admission patient needed assistance:   Dependent ADLs:: Ambulation-no assistive device,Independent  Dependent IADLs:: Transportation  Assesssment of Functional Status: At functional baseline    Mental Health Status:  Mental Health Status: No Current Concerns       Chemical Dependency Status:                Values/Beliefs:  Spiritual, Cultural Beliefs, Confucianist Practices, Values that affect care:                 Additional Information:    JOSEF assessed. Pt lives in home with adult grandson.  Pt is independent with ADLs, no resources.  Pt has HCD paperwork at home to fill out.  Pt will have transport home at time of discharge.      Shey Toledo RN

## 2022-02-08 NOTE — CONSULTS
NEUROLOGY CONSULTATION NOTE     Julisa Chaney,  1945, MRN [id number] Date: 2022     [facility]   Code status:  Full Code   PCP: Mara Murillo, [phone]      ASSESSMENT & PLAN   Diagnosis code: Left arm weakness/asterixis    Left arm weakness/asterixis  Spells of slurred speech  Complains of generalized weakness with fever      Patient has unilateral asterixis in the left upper extremity which could be secondary to a structural lesion or could be more metabolic.  Epileptic lesions are also a possibility    Head CT negative for acute stroke    CT angiogram negative for any significant large vessel occlusion stenosis    MRI brain    EEG    Electrolytes noncontributory.  We will check ammonia    Hepatic panel UA noncontributory    Also check B12/TSH    PT/OT    Consider Keppra depending on EEG results/blood work results    Seizure precautions       Chief Complaint   Patient presents with     Generalized Weakness     Decreased Appetite     Slurred Speech        HISTORY OF PRESENT ILLNESS     We have been requested by Dr. Ledesma to evaluate Julisa Chaney who is a 76 year old  female for left-sided weakness.  Patient is unable to provide limited history.  She reports she woke up and her arms were shaking she could not grab of anything.  She has not had similar symptoms in the past.  Reports no chest pains palpitations or headaches.  Does not take aspirin at baseline.  No other blood thinners.  Per patient she was having some difficulty finding words as well.  Currently she reports symptoms have improved though continues to have issues with her left arm.  Presentation to the ER and stroke code was called the patient was done in the window for IV thrombolytics.  CT angiogram not show any large vessel occlusion/stenosis.  MRI brain is currently pending even though the patient reports that she had this done this morning.    Per the admission H&P:-Patient does have a history of migraines who presented  with generalized weakness.  Has been having progressive weakness for the last 3 days associated with poor appetite and diarrhea which is now resolved.  Patient reports more left upper extremity weakness on yesterday.  Also had episodes of slurred speech and forgetfulness over the last few days.  Noted to be febrile in the ED the patient did not notice that.  Denies any vision changes chest pain shortness of breath palpitations.     PAST MEDICAL & SURGICAL HISTORY     Medical History  Past Medical History:   Diagnosis Date     High cholesterol      Migraines      Sepsis (H) 8/10/2020     Surgical History  Past Surgical History:   Procedure Laterality Date     HYSTERECTOMY       AK ESOPHAGOGASTRODUODENOSCOPY TRANSORAL DIAGNOSTIC N/A 2020    Procedure: ESOPHAGOGASTRODUODENOSCOPY (EGD);  Surgeon: Nathan Smalls MD;  Location: [facility];  Service: Gastroenterology     AK ESOPHAGOGASTRODUODENOSCOPY TRANSORAL DIAGNOSTIC N/A 2020    Procedure: ESOPHAGOGASTRODUODENOSCOPY (EGD), PYLORIC DILATION;  Surgeon: Nathan Smalls MD;  Location: [facility] [place];  Service: Gastroenterology     [facility] COLONOSCOPY W/WO BRUSH/WASH N/A 2020    Procedure: COLONOSCOPY WITH POLYPECTOMY;  Surgeon: Nathan Smalls MD;  Location: [facility];  Service: Gastroenterology        SOCIAL HISTORY     Social History     Tobacco Use     Smoking status: Former Smoker     Packs/day: 1.00     Years: 50.00     Pack years: 50.00     Quit date: 2014     Years since quittin.2     Smokeless tobacco: Never Used   Substance Use Topics     Alcohol use: No     Drug use: No        FAMILY HISTORY     Reviewed, and family history includes Breast Cancer in her maternal aunt, mother, sister, and sister; Cancer in her father; Testicular cancer (age of onset: 17.00) in her grandchild.     ALLERGIES     Allergies   Allergen Reactions     Contrast [Iohexol] Rash     Noticed during 2020 admission. Received IV contrast on   "    Chocolate Headache     Mirtazapine Other (See Comments)     \"Needles to the face\"     Penicillins Rash        REVIEW OF SYSTEMS     12 system ROS was noted in the HPI this was negative.  Pertinent positives noted in the HPI.     HOME & HOSPITAL MEDICATIONS     Prior to Admission Medications  (Not in a hospital admission)      Hospital Medications    azithromycin  500 mg Intravenous Q24H     cefTRIAXone  1 g Intravenous Q24H     ferrous sulfate  325 mg Oral Daily with breakfast     LORazepam  1 mg Intravenous Once     nortriptyline  50 mg Oral BID     OXcarbazepine  450 mg Oral At Bedtime     pantoprazole  40 mg Oral QAM AC     sodium chloride (PF)  3 mL Intracatheter Q8H     topiramate  50 mg Oral At Bedtime        PHYSICAL EXAM     Vital signs  Temp:  [98.2  F (36.8  C)-100.4  F (38  C)] 98.2  F (36.8  C)  Pulse:  [61-89] 72  Resp:  [16-37] 37  BP: ()/(51-70) 113/57  SpO2:  [95 %-100 %] 100 %    PHYSICAL EXAMINATION  VITALS: /57   Pulse 72   Temp 98.2  F (36.8  C) (Oral)   Resp (!) 37   Ht 1.702 m (5' 7\")   Wt 51.3 kg (113 lb)   SpO2 100%   BMI 17.70 kg/m    GENERAL -Health appearing, No apparent distress  EYES- No scleral icterus, no eyelid droop, Pupils - see Neuro section  HEENT - Normocephalic, atraumatic, Hearing grossly intact; Oral mucosa moist and pink in color. External Ears and nose intact.   Neck - supple with no obvious lymphadenopathy or thyromegaly  PULM - Good spontaneous respiratory effort; Normal palpation of chest.   CV- Pedal pulses present with no peripheral edema/ No significant varicosities.  MSK- Gait - see Neuro section; Strength and tone- see Neuro section; Range of motion grossly intact.  PSYCH -cooperative    Neurological  Mental status - Patient is awake and oriented to self, place and time. Attention span is normal. Language is fluent and follows commands appropriately.   Cranial nerves - CN II-XII intact. Pupils are reactive and symmetric; EOMI, VFIT, NLF " symmetric  Motor - There is no pronator drift. Motor exam shows 5/5 strength in all extremities.  Tone - Tone is symmetric bilaterally in upper and lower extremities.  Asterixis in the left upper extremity.  Reflexes - Reflexes are symmetric symmetric.  Sensation - Sensory exam is grossly intact to light touch, pain.  Coordination - Finger to nose and heel to shin is without dysmetria except in the left arm due to the asterixis  Gait and station --unable to safely ambulate.  Formal gait testing cannot be done due to safety concerns from ongoing medical issues.       DIAGNOSTIC STUDIES     Pertinent Radiology   CTA  HEAD CT:  1.  No CT evidence of acute intracranial hemorrhage, mass or recent infarct.  2.  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  3.  Mild volume loss and presumed chronic small vessel ischemic changes.     HEAD CTA:   1.  No major vessel acute thromboembolism, flow-limiting stenosis or occlusion.  2.  Evidence of mild intracranial atherosclerosis.     NECK CTA:  1.  Normal neck CTA.  2.  Evidence of a right upper lobe infiltrate. Correlate for pneumonia.  3.  Underlying mild centrilobular emphysema.    ECHO 2020  . Normal left ventricular size and systolic performance with a visually estimated ejection fraction of 60-65%.   2. There is mild aortic insufficiency.   3. Normal right ventricular size and systolic performance.  4. No obvious valvular vegetations identified on this study.  5. Right ventricular systolic pressure relative to right atrial pressure is moderately increased.  The pulmonary artery pressure is estimated to be 50 mmHg plus right atrial pressure (the IVC is of fairly normal caliber).     Recent Results (from the past 24 hour(s))   Glucose by meter    Collection Time: 02/06/22 11:53 PM   Result Value Ref Range    GLUCOSE BY METER POCT 134 (H) 70 - 99 mg/dL   CBC with platelets    Collection Time: 02/07/22 12:11 AM   Result Value Ref Range    WBC Count 16.3 (H) 4.0 - 11.0  10e3/uL    RBC Count 4.41 3.80 - 5.20 10e6/uL    Hemoglobin 10.3 (L) 11.7 - 15.7 g/dL    Hematocrit 35.6 35.0 - 47.0 %    MCV 81 78 - 100 fL    MCH 23.4 (L) 26.5 - 33.0 pg    MCHC 28.9 (L) 31.5 - 36.5 g/dL    RDW 18.1 (H) 10.0 - 15.0 %    Platelet Count 382 150 - 450 10e3/uL   Basic metabolic panel    Collection Time: 02/07/22 12:11 AM   Result Value Ref Range    Sodium 139 136 - 145 mmol/L    Potassium 3.7 3.5 - 5.0 mmol/L    Chloride 106 98 - 107 mmol/L    Carbon Dioxide (CO2) 25 22 - 31 mmol/L    Anion Gap 8 5 - 18 mmol/L    Urea Nitrogen 12 8 - 28 mg/dL    Creatinine 0.73 0.60 - 1.10 mg/dL    Calcium 8.7 8.5 - 10.5 mg/dL    Glucose 138 (H) 70 - 125 mg/dL    GFR Estimate 85 >60 mL/min/1.73m2   Troponin I    Collection Time: 02/07/22 12:11 AM   Result Value Ref Range    Troponin I <0.01 0.00 - 0.29 ng/mL   Extra Red Top Tube    Collection Time: 02/07/22 12:11 AM   Result Value Ref Range    Hold Specimen [facility]    Hepatic panel    Collection Time: 02/07/22 12:11 AM   Result Value Ref Range    Bilirubin Total 0.2 0.0 - 1.0 mg/dL    Bilirubin Direct <0.1 <=0.5 mg/dL    Protein Total 6.8 6.0 - 8.0 g/dL    Albumin 3.3 (L) 3.5 - 5.0 g/dL    Alkaline Phosphatase 138 (H) 45 - 120 U/L    AST 14 0 - 40 U/L    ALT 17 0 - 45 U/L   Partial thromboplastin time    Collection Time: 02/07/22 12:12 AM   Result Value Ref Range    aPTT 26 22 - 38 Seconds   INR    Collection Time: 02/07/22 12:12 AM   Result Value Ref Range    INR 1.05 0.85 - 1.15   Lactic acid whole blood    Collection Time: 02/07/22 12:17 AM   Result Value Ref Range    Lactic Acid 0.9 0.7 - 2.0 mmol/L   Creatinine POCT    Collection Time: 02/07/22 12:19 AM   Result Value Ref Range    Creatinine POCT 0.6 0.6 - 1.1 mg/dL    GFR, ESTIMATED POCT >60 >60 mL/min/1.73m2   Symptomatic; Unknown Influenza A/B & SARS-CoV2 (COVID-19) Virus PCR Multiplex Nose    Collection Time: 02/07/22  1:39 AM    Specimen: Nose; Swab   Result Value Ref Range    Influenza A PCR Negative Negative     Influenza B PCR Negative Negative    SARS CoV2 PCR Negative Negative   UA with Microscopic reflex to Culture    Collection Time: 02/07/22 11:02 AM    Specimen: Urine, Abrams Catheter   Result Value Ref Range    Color Urine Light Yellow Colorless, Straw, Light Yellow, Yellow    Appearance Urine Clear Clear    Glucose Urine Negative Negative mg/dL    Bilirubin Urine Negative Negative    Ketones Urine Negative Negative mg/dL    Specific Gravity Urine 1.034 (H) 1.001 - 1.030    Blood Urine Negative Negative    pH Urine 6.5 5.0 - 7.0    Protein Albumin Urine Negative Negative mg/dL    Urobilinogen Urine <2.0 <2.0 mg/dL    Nitrite Urine Negative Negative    Leukocyte Esterase Urine Negative Negative    RBC Urine 0 <=2 /HPF    WBC Urine <1 <=5 /HPF       Total time spent for face to face visit, reviewing labs/imaging studies, counseling and coordination of care was: Over 70 min More than 50% of this time was spent on counseling and coordination of care.    Counseling patient coordination of care with the ER physician.  Coordination of care with the primary team.  Reviewing imaging studies.    Jamie Clarke MD  Neurologist  [facility]  Tel:- [phone]

## 2022-02-08 NOTE — SIGNIFICANT EVENT
Significant Event Note    Time of event: 10:32 PM February 7, 2022    Description of event:  RN notified house officer of rash over chest, back, and abdomen.   Pt is a 76 year old female admitted for generalized weakness and CAP.     She has been getting ceftriazone and azithromycin for CAP. She has a rash allergy to penicillins. Last dose of rocephin was about 9:30pm. She denies itching, dizziness, cough, sore throat, throat swelling, or shortness of breath.     EXAM:  Gen: alert, no acute distress  CV: RRR nomumrur  RESP: CTAB  SKIN: macular confluent erythematous rash over chest, abdomen, and back, sparing arms and legs, and face.   EXT: No peripheral edema.    Plan:  Rash  Possibly related to ceftriaxone. She is allergic to penicillins, gets rash. At this time pt is asymptomatic, no itching, no shortness of breath, no throat swelling, no pain.   -benadryl  -Monitor for progression of symptoms.     ADDENDUM:  Notified of gram positive clusters growing on blood cultures.   -Pt currently on ceftriaxone and azithromycin.   - At this time will add vancomycin to cover for MRSA. Will await further characterization of blood cultures.       Discussed with: bedside nurse    Sean Green,

## 2022-02-08 NOTE — PHARMACY-VANCOMYCIN DOSING SERVICE
Pharmacy Vancomycin Initial Note  Date of Service 2022  Patient's  1945  76 year old, female    Indication: Bacteremia  Current estimated CrCl = Estimated Creatinine Clearance: 65.7 mL/min (based on SCr of 0.6 mg/dL).  Creatinine for last 3 days  2022: 12:11 AM Creatinine 0.73 mg/dL; 12:19 AM Creatinine POCT 0.6 mg/dL  Recent Vancomycin Level(s) for last 3 days  No results found for requested labs within last 72 hours.      Vancomycin IV Administrations (past 72 hours)      No vancomycin orders with administrations in past 72 hours.                Nephrotoxins and other renal medications (From now, onward)    Start     Dose/Rate Route Frequency Ordered Stop    22 0100  vancomycin (VANCOCIN) 1000 mg in dextrose 5% 200 mL PREMIX         20 mg/kg × 52.2 kg  200 mL/hr over 1 Hours Intravenous EVERY 18 HOURS 22 2343            Contrast Orders - past 72 hours (72h ago, onward)            Start     Dose/Rate Route Frequency Ordered Stop    22 1300  barium sulfate (VARIBAR THIN Liquid) 40 % oral suspension 24 g         60 mL Oral ONCE 22 1230 22 1232    22 1300  barium sulfate 40% (VARIBAR NECTAR) oral suspension          Oral ONCE 22 1230 22 1232    22 1300  barium sulfate (VARIBAR) 40 % pudding/paste 20 mL         20 mL Oral ONCE 22 1230 22 1232    22 1300  barium sulfate 40% (VARIBAR THIN HONEY) oral suspension          Oral ONCE 22 1230 22 1232    22 0100  iopamidol (ISOVUE-370) solution 75 mL         75 mL Intravenous ONCE 22 0039 22 0040          [employer] Prediction of Planned Initial Vancomycin Regimen     Loading dose: 1000 mg at 00:00 2022.  Regimen: 1000 mg IV every 18 hours.  Start time: 23:47 on 2022  Exposure target: AUC24 (range)400-600 mg/L.hr   AUC24,ss: 548 mg/L.hr  Probability of AUC24 > 400: 82 %  Ctrough,ss: 16.5 mg/L  Probability of Ctrough,ss > 20: 33 %  Probability  of nephrotoxicity (Lodise ESTIVEN 2009): 12 %  nd paste [employer] summary here (delete text if not performing AUC-guided monitoring)}        Plan:  1. Start vancomycin  1000 mg IV q18h.   2. Vancomycin monitoring method: AUC  3. Vancomycin therapeutic monitoring goal: 400-600 mg*h/L  4. Pharmacy will check vancomycin levels as appropriate in 1-3 Days.    5. Serum creatinine levels will be ordered daily for the first week of therapy and at least twice weekly for subsequent weeks.      Park Cope, RPH

## 2022-02-08 NOTE — PROGRESS NOTES
"Hospitalist Progress Note  ADMIT DATE: 2/6/2022     FACILITY: [facility]    PCP: Mara Murillo, [phone]    Assessment/Plan    Julisa Chaney is a 76 year old female with past medical history of hyperlipidemia, migraine who presents with fever, generalized weakness, and subjective left-sided weakness.  Stroke work-up so far negative at the ED based on CT and CTA, MRI still pending.  On work-up at ED she was found to to have pneumonia.      Generalized weakness  Tremors  -Generalized weakness intermittent episodes of slurred speech, reports left side more weak than the right.  -Initially stroke code was called at ED, then the deescalated  -CT and CT angiogram no acute changes or major vessel occlusions, also with no objective focal deficits on exam  -Patient declined MRI, she wants to be \" totally out\" just like when she had her colonoscopy, declined to try even with Ativan.  Can be tried in the morning if this is strongly needed after neurology eval.    -Neurochecks Q4 hrs, PT/OT, Monitor on tele, neurology consult     Abnormal CXR  ? aspiration pneumonia  -WBC 16.3, chest x-ray with right perihilar infiltrate- no productive cough, no fever; procalcitonin wnl  -Ceftriaxone and azithromycin, incentive spirometry started on admission -> developed rash allover; hold rocephin (h/o PCN allergy)  -blood cx positive for GPC in cluster; bacteriemia or contamination-> oncall doc started on Vanco-> ID consultation for need of antibiotics     Mild aspiration  -Speech therapy fo swallow eval and order diet accordingly    Hypokalemia  -replace as per protocol   -check mg, replace if low    Rash  -allergic to iv contrast for ct vs to Rocephin (h/o pcn allergy with rash)  -Benadryl prn     Iron deficiency anemia  -Resume oral iron, monitor Hgb serially         Diet: advance diet as per speech therapy  DVT Prophylaxis: Pneumatic Compression Devices  Abrams Catheter: Not present  Central " Lines: None  Cardiac Monitoring: None  Code Status:  Full          Disposition Plan     Expected Discharge: 1-2 days      Barriers to Discharge:  ID and neurology consultation; disposition plan    Subjective  Feels btter, no productive cough, no f/c, no cp/sob; ok with MRI only if under general anesthesia; rash allover but no itchiness; no new focal neurological deficits    Objective    Vital signs in last 24 hours  Temp:  [97.9  F (36.6  C)-98.4  F (36.9  C)] 98.3  F (36.8  C)  Pulse:  [61-75] 73  Resp:  [16-37] 18  BP: (102-129)/(52-70) 103/52  SpO2:  [95 %-100 %] 95 % [unfilled] O2 Device: None (Room air)    Weight:   [unfilled] Weight change: 0.907 kg (2 lb)    Intake/Output last 3 shifts  I/O last 3 completed shifts:  In: -   Out: 500 [Urine:500]  Body mass index is 18.56 kg/m .    Physical Exam    Physical Exam  General appearance: not in acute distress  HEENT: PERRL, EOMI  Lungs: Clear breath sounds in bilateral lung fields  Cardiovascular: Regular rate and rhythm, normal S1-S2  Abdomen: Soft, non tender, no distension, normal bowel sound  Musculoskeletal: No joint swelling  Skin: No rash and no edema  Neurology: AAO ×3.  Cranial nerves II - XII normal.  General weakness      Pertinent Labs   Lab Results: personally reviewed.   Results for CHELITA SAWANT (MRN [id number]) as of 2/8/2022 09:50   Ref. Range 2/8/2022 07:43   Sodium Latest Ref Range: 136 - 145 mmol/L 143   Potassium Latest Ref Range: 3.5 - 5.0 mmol/L 3.1 (L)   Chloride Latest Ref Range: 98 - 107 mmol/L 112 (H)   Carbon Dioxide Latest Ref Range: 22 - 31 mmol/L 22   Urea Nitrogen Latest Ref Range: 8 - 28 mg/dL 8   Creatinine Latest Ref Range: 0.60 - 1.10 mg/dL 0.58 (L)   GFR Estimate Latest Ref Range: >60 mL/min/1.73m2 >90   Calcium Latest Ref Range: 8.5 - 10.5 mg/dL 7.8 (L)   Anion Gap Latest Ref Range: 5 - 18 mmol/L 9   Ammonia Latest Ref Range: 11 - 35 umol/L 21   Procalcitonin Latest Ref Range: 0.00 - 0.49 ng/mL 0.03   Glucose Latest Ref  Range: 70 - 125 mg/dL 94   WBC Latest Ref Range: 4.0 - 11.0 10e3/uL 9.2   Hemoglobin Latest Ref Range: 11.7 - 15.7 g/dL 9.1 (L)   Hematocrit Latest Ref Range: 35.0 - 47.0 % 30.7 (L)   Platelet Count Latest Ref Range: 150 - 450 10e3/uL 391   RBC Count Latest Ref Range: 3.80 - 5.20 10e6/uL 3.90   MCV Latest Ref Range: 78 - 100 fL 79   MCH Latest Ref Range: 26.5 - 33.0 pg 23.3 (L)   MCHC Latest Ref Range: 31.5 - 36.5 g/dL 29.6 (L)   RDW Latest Ref Range: 10.0 - 15.0 % 18.0 (H)   % Neutrophils Latest Units: % 74   % Lymphocytes Latest Units: % 17       Medications  Scheduled Meds:    azithromycin  500 mg Intravenous Q24H     [Held by provider] cefTRIAXone  1 g Intravenous Q24H     ferrous sulfate  325 mg Oral Daily with breakfast     LORazepam  1 mg Intravenous Once     nortriptyline  50 mg Oral BID     OXcarbazepine  450 mg Oral At Bedtime     pantoprazole  40 mg Oral QAM AC     sodium chloride (PF)  3 mL Intracatheter Q8H     topiramate  50 mg Oral At Bedtime     vancomycin  20 mg/kg Intravenous Q18H     Continuous Infusions:    sodium chloride 100 mL/hr at 02/07/22 1802     PRN Meds:.acetaminophen **OR** acetaminophen, acetaminophen-codeine, lidocaine 4%, lidocaine (buffered or not buffered), melatonin, ondansetron **OR** ondansetron, polyvinyl alcohol, sodium chloride (PF)    Pertinent Radiology   Radiology Results personally reviewed  EXAM: XR VIDEO SWALLOW WITH SLP OR OT  LOCATION: [facility]  DATE/TIME: 2/7/2022 11:35 AM     INDICATION: Difficulty swallowing.  COMPARISON: None.     TECHNIQUE: Routine swallow study with speech pathology using multiple barium thicknesses.     FINDINGS:   FLUOROSCOPIC TIME: 1 minutes.  NUMBER OF IMAGES: 0     Swallow study with Speech Pathology using multiple barium thicknesses.      Trace amount of aspiration is seen with thin barium consistency. No other evidence of laryngeal penetration or aspiration is seen with other barium consistencies. Please see  dedicated Speech Pathology report for further details of examination.       Advanced Care Planning:  Discharge planning discussed with patient         [facility] Medicine Service  Elana Warren

## 2022-02-08 NOTE — PROGRESS NOTES
Care Management Follow Up    Length of Stay (days): 1    Expected Discharge Date: 02/09/2022     Concerns to be Addressed:     Medical progression. Neurology following-EEG pending, MRI pending. ID following-positive cultures, IV antibiotics.   Patient plan of care discussed at interdisciplinary rounds: Yes    Anticipated Discharge Disposition:  To be determined     Anticipated Discharge Services:  Therapy recommending TCU  Anticipated Discharge DME:  To be determined    Patient/family educated on Medicare website which has current facility and service quality ratings:  Yes  Education Provided on the Discharge Plan:  Yes  Patient/Family in Agreement with the Plan:  Yes    Referrals Placed by CM/SW:  None at this time   Private pay costs discussed: Not applicable    Additional Information:  Therapy worked with patient, recommending TCU at discharge. Met with patient to discuss therapy recommendation. She is familiar [facility] TCU facilities, but does not want to go at this time. Discussed potential home care services and patient declines home care as well. Asked patient if there was anyone to help her in the home, patient stated no. Writer discuss that therapy recommending TCU as she will likely need additional assistance at discharge. Patient would like to see how she progresses tomorrow. Care Manager to follow up again on 2/9.   Per previous note from home with adult grandson and is independent at baseline. Has Healthcare Directive paperwork at home to complete. Family to likely transport.       Ashwini Worley RN

## 2022-02-09 ENCOUNTER — APPOINTMENT (OUTPATIENT)
Dept: SPEECH THERAPY | Facility: HOSPITAL | Age: 77
DRG: 178 | End: 2022-02-09
Payer: COMMERCIAL

## 2022-02-09 ENCOUNTER — APPOINTMENT (OUTPATIENT)
Dept: CARDIOLOGY | Facility: HOSPITAL | Age: 77
DRG: 178 | End: 2022-02-09
Attending: PSYCHIATRY & NEUROLOGY
Payer: COMMERCIAL

## 2022-02-09 ENCOUNTER — APPOINTMENT (OUTPATIENT)
Dept: PHYSICAL THERAPY | Facility: HOSPITAL | Age: 77
DRG: 178 | End: 2022-02-09
Payer: COMMERCIAL

## 2022-02-09 LAB
MAGNESIUM SERPL-MCNC: 1.7 MG/DL (ref 1.8–2.6)
POTASSIUM BLD-SCNC: 3.7 MMOL/L (ref 3.5–5)

## 2022-02-09 PROCEDURE — 250N000011 HC RX IP 250 OP 636: Performed by: INTERNAL MEDICINE

## 2022-02-09 PROCEDURE — 99232 SBSQ HOSP IP/OBS MODERATE 35: CPT | Performed by: PSYCHIATRY & NEUROLOGY

## 2022-02-09 PROCEDURE — 36415 COLL VENOUS BLD VENIPUNCTURE: CPT | Performed by: INTERNAL MEDICINE

## 2022-02-09 PROCEDURE — 93306 TTE W/DOPPLER COMPLETE: CPT

## 2022-02-09 PROCEDURE — 84132 ASSAY OF SERUM POTASSIUM: CPT | Performed by: INTERNAL MEDICINE

## 2022-02-09 PROCEDURE — 97110 THERAPEUTIC EXERCISES: CPT | Mod: GP

## 2022-02-09 PROCEDURE — 97116 GAIT TRAINING THERAPY: CPT | Mod: GP

## 2022-02-09 PROCEDURE — 250N000011 HC RX IP 250 OP 636: Performed by: STUDENT IN AN ORGANIZED HEALTH CARE EDUCATION/TRAINING PROGRAM

## 2022-02-09 PROCEDURE — 250N000013 HC RX MED GY IP 250 OP 250 PS 637: Performed by: STUDENT IN AN ORGANIZED HEALTH CARE EDUCATION/TRAINING PROGRAM

## 2022-02-09 PROCEDURE — 258N000003 HC RX IP 258 OP 636: Performed by: STUDENT IN AN ORGANIZED HEALTH CARE EDUCATION/TRAINING PROGRAM

## 2022-02-09 PROCEDURE — 93306 TTE W/DOPPLER COMPLETE: CPT | Mod: 26 | Performed by: INTERNAL MEDICINE

## 2022-02-09 PROCEDURE — 83735 ASSAY OF MAGNESIUM: CPT | Performed by: INTERNAL MEDICINE

## 2022-02-09 PROCEDURE — 120N000001 HC R&B MED SURG/OB

## 2022-02-09 PROCEDURE — 250N000013 HC RX MED GY IP 250 OP 250 PS 637: Performed by: INTERNAL MEDICINE

## 2022-02-09 PROCEDURE — 99232 SBSQ HOSP IP/OBS MODERATE 35: CPT

## 2022-02-09 PROCEDURE — 92526 ORAL FUNCTION THERAPY: CPT | Mod: GN | Performed by: SPEECH-LANGUAGE PATHOLOGIST

## 2022-02-09 PROCEDURE — 250N000013 HC RX MED GY IP 250 OP 250 PS 637: Performed by: PSYCHIATRY & NEUROLOGY

## 2022-02-09 PROCEDURE — 99233 SBSQ HOSP IP/OBS HIGH 50: CPT | Performed by: INTERNAL MEDICINE

## 2022-02-09 RX ADMIN — ACETAMINOPHEN AND CODEINE PHOSPHATE 1 TABLET: 300; 30 TABLET ORAL at 23:02

## 2022-02-09 RX ADMIN — ACETAMINOPHEN AND CODEINE PHOSPHATE 1 TABLET: 300; 30 TABLET ORAL at 10:34

## 2022-02-09 RX ADMIN — PANTOPRAZOLE SODIUM 40 MG: 20 TABLET, DELAYED RELEASE ORAL at 07:26

## 2022-02-09 RX ADMIN — OXCARBAZEPINE 450 MG: 300 TABLET, FILM COATED ORAL at 21:01

## 2022-02-09 RX ADMIN — NORTRIPTYLINE HYDROCHLORIDE 50 MG: 50 CAPSULE ORAL at 20:45

## 2022-02-09 RX ADMIN — VANCOMYCIN HYDROCHLORIDE 1000 MG: 1 INJECTION, SOLUTION INTRAVENOUS at 13:27

## 2022-02-09 RX ADMIN — ACETAMINOPHEN AND CODEINE PHOSPHATE 1 TABLET: 300; 30 TABLET ORAL at 04:23

## 2022-02-09 RX ADMIN — Medication 1000 MCG: at 08:22

## 2022-02-09 RX ADMIN — FERROUS SULFATE TAB 325 MG (65 MG ELEMENTAL FE) 325 MG: 325 (65 FE) TAB at 08:23

## 2022-02-09 RX ADMIN — TOPIRAMATE 50 MG: 25 TABLET, FILM COATED ORAL at 21:00

## 2022-02-09 RX ADMIN — AZITHROMYCIN MONOHYDRATE 500 MG: 500 INJECTION, POWDER, LYOPHILIZED, FOR SOLUTION INTRAVENOUS at 22:57

## 2022-02-09 RX ADMIN — ENOXAPARIN SODIUM 40 MG: 40 INJECTION SUBCUTANEOUS at 17:49

## 2022-02-09 RX ADMIN — ASPIRIN 81 MG: 81 TABLET, CHEWABLE ORAL at 08:21

## 2022-02-09 RX ADMIN — NORTRIPTYLINE HYDROCHLORIDE 50 MG: 50 CAPSULE ORAL at 08:21

## 2022-02-09 RX ADMIN — ACETAMINOPHEN AND CODEINE PHOSPHATE 1 TABLET: 300; 30 TABLET ORAL at 17:49

## 2022-02-09 ASSESSMENT — ACTIVITIES OF DAILY LIVING (ADL)
ADLS_ACUITY_SCORE: 15

## 2022-02-09 ASSESSMENT — MIFFLIN-ST. JEOR: SCORE: 1032.47

## 2022-02-09 NOTE — PLAN OF CARE
Problem: Adult Inpatient Plan of Care  Goal: Absence of Hospital-Acquired Illness or Injury  Intervention: Identify and Manage Fall Risk  Recent Flowsheet Documentation  Taken 2/9/2022 0825 by Nolan Cordon, ABRIL  Safety Promotion/Fall Prevention:   bed alarm on   assistive device/personal items within reach   activity supervised   fall prevention program maintained     Problem: Adult Inpatient Plan of Care  Goal: Absence of Hospital-Acquired Illness or Injury  Intervention: Prevent Skin Injury  Recent Flowsheet Documentation  Taken 2/9/2022 0825 by Nolan Cordon RN  Body Position: position changed independently     Problem: Pain Acute  Goal: Acceptable Pain Control and Functional Ability  Outcome: No Change   C/o left head pain like electric shock. Receive Tylenol # 3 x 1 with some relieves.

## 2022-02-09 NOTE — PROGRESS NOTES
Infectious Disease Progress Note    Assessment/Plan  Impression: Weakness and balance problems, likely secondary to new stroke.     RUL pnuemonia on CXR and CT     Patient also found to aspirate, so aspiration pneumonia is a possibility.     Single positive blood culture with gram-positive cocci in clusters suspicious for contaminant is 3 other bottles are negative--still waiting on identificantion.     Recommendations: I ordered a repeat chest x-ray.  If there is no infiltrate, would discontinue antimicrobial chemotherapy for pneumonitis.     Reasonably keep vancomycin on board until we give a little bit more time for the other blood cultures to incubate.     Further recommendations to follow clinical course.  Active Problems:    Left-sided weakness    Pneumonia of right upper lobe due to infectious organism      WOO DAVIS MD  [phone]      Subjective  Feel better.  Denies pruritis     Objective    Vital signs in last 24 hours  Temp:  [97.8  F (36.6  C)-98.3  F (36.8  C)] 98.3  F (36.8  C)  Pulse:  [79-82] 79  Resp:  [18] 18  BP: (101-117)/(54-55) 101/54  SpO2:  [93 %-95 %] 93 %  Wt Readings from Last 3 Encounters:   02/07/22 52.2 kg (115 lb)   05/04/21 51.4 kg (113 lb 4.8 oz)   08/21/20 49.9 kg (110 lb)       Intake/Output last 3 shifts  I/O last 3 completed shifts:  In: 243 [P.O.:240; I.V.:3]  Out: 2025 [Urine:2025]  Intake/Output this shift:  No intake/output data recorded.    Review of Systems   Pertinent items are noted in HPI., otherwise negative.    Physical Exam  General appearance: alert, appears stated age, cooperative and thin  Head: Normocephalic, without obvious abnormality, atraumatic  Eyes: EOMI, no icterus  Neck: no adenopathy and supple, symmetrical, trachea midline  Lungs: clear to auscultation bilaterally  Heart: RRR, no murmur  Abdomen: soft, non-tender; bowel sounds normal; no masses,  no organomegaly  Extremities: warm and dry  Skin: erythroderma is unchanged  Neurologic: Grossly  normal  Psychiatric: Normal    Pertinent Labs   Lab Results: personally reviewed.     Recent Labs   Lab 02/08/22  0743 02/07/22  0011   WBC 9.2 16.3*   HGB 9.1* 10.3*   HCT 30.7* 35.6    382        Recent Labs   Lab 02/08/22  0743 02/07/22  0011    139   CO2 22 25   BUN 8 12     No results found for: CULT    Collected Updated Procedure Result Status    02/07/2022 0139 02/07/2022 0310 Symptomatic; Unknown Influenza A/B & SARS-CoV2 (COVID-19) Virus PCR Multiplex Nose [[id number]]    Swab from Nose    Final result Component Value   Influenza A PCR Negative   Influenza B PCR Negative   SARS CoV2 PCR Negative   NEGATIVE: SARS-CoV-2 (COVID-19) RNA not detected, presumed negative.           02/07/2022 0136 02/07/2022 2251 Blood Culture Peripheral Blood [[id number]]   (Abnormal)   Peripheral Blood    Preliminary result Component Value   Culture Positive on the 1st day of incubation Abnormal  P    Gram positive cocci in clusters Panic  P    1 of 2 bottles           02/07/2022 0136 02/09/2022 0931 Blood Culture Peripheral Blood [[id number]]   Peripheral Blood    Preliminary result Component Value   Culture No growth after 2 days P           02/07/2022 0136 02/08/2022 0129 Verigene GP Panel [[id number]]    Peripheral Blood    Final result Component Value   Staphylococcus species Not Detected   Staphylococcus aureus Not Detected   Staphylococcus epidermidis Not Detected   Staphylococcus lugdunensis Not Detected   Enterococcus faecalis Not Detected   Enterococcus faecium Not Detected   Streptococcus species Not Detected   Streptococcus agalactiae Not Detected   Streptococcus anginosus group Not Detected   Streptococcus pneumoniae Not Detected   Streptococcus pyogenes Not Detected   Listeria species Not Detected            Pertinent Radiology   XR Chest Port 1 View    Result Date: 2/8/2022  EXAM: XR CHEST PORT 1 VIEW LOCATION: [facility] DATE/TIME: 2/8/2022 10:07 AM INDICATION: follow  up possible infiltrates COMPARISON: 02/07/2022.     IMPRESSION: There is increased airspace opacity in the right mid to upper lung consistent with right-sided pneumonia. Left lung is clear. No pneumothorax or pleural effusion. The heart size and pulmonary vascularity are normal. NOTE: ABNORMAL REPORT THE DICTATION ABOVE DESCRIBES AN ABNORMALITY FOR WHICH FOLLOW-UP IS NEEDED.     XR Chest Port 1 View    Result Date: 2/7/2022  EXAM: XR CHEST PORT 1 VIEW LOCATION: [facility] DATE/TIME: 2/7/2022 12:51 AM INDICATION: Fever. COMPARISON: 8/8/2020.     IMPRESSION: Question some mild hazy right perihilar infiltrate with a mild or low-grade pneumonitis not excluded. Left lung is clear. Normal heart size and pulmonary vascularity. Osteopenia. Remainder unremarkable.    CTA Head Neck with Contrast    Result Date: 2/7/2022  EXAM: CTA  HEAD NECK WITH CONTRAST LOCATION: [facility] DATE/TIME: 2/7/2022 12:02 AM INDICATION: Code Stroke to evaluate for potential thrombolysis and thrombectomy.  PLEASE READ IMMEDIATELY. Left-sided weakness and slurred speech. COMPARISON: Head CT 02/06/2019. CONTRAST: 75 ml Isovue 370. TECHNIQUE: Head and neck CT angiogram with IV contrast. Noncontrast head CT followed by axial helical CT images of the head and neck vessels obtained during the arterial phase of intravenous contrast administration. Axial 2D reconstructed images and multiplanar 3D MIP reconstructed images of the head and neck vessels were performed by the technologist. Dose reduction techniques were used. All stenosis measurements made according to NASCET criteria unless otherwise specified. FINDINGS: NONCONTRAST HEAD CT: INTRACRANIAL CONTENTS: No intracranial hemorrhage, extraaxial collection, or mass effect. No CT evidence of acute infarct. Mild presumed chronic small vessel ischemic changes. Mild generalized volume loss. No hydrocephalus. There are small chronic infarcts in the bilateral  cerebellar hemispheres. VISUALIZED ORBITS/SINUSES/MASTOIDS: No intraorbital abnormality. No paranasal sinus mucosal disease. No middle ear or mastoid effusion. BONES/SOFT TISSUES: No acute abnormality. HEAD CTA: ANTERIOR CIRCULATION: No major vessel intraluminal filling defect, flow-limiting stenosis or occlusion. Mild to moderate atheromatous changes in the carotid siphons. Within the limits of CTA, no convincing aneurysm or high flow vascular lesion. Standard [place] anatomy. POSTERIOR CIRCULATION: No major vessel intraluminal filling defect, flow-limiting stenosis or occlusion. There are mild scattered atheromatous changes. Dominant right and smaller left vertebral artery contribute to a normal basilar artery. DURAL VENOUS SINUSES: Expected enhancement of the major dural venous sinuses. NECK CTA: RIGHT CAROTID: No measurable stenosis or dissection. LEFT CAROTID: No measurable stenosis or dissection. VERTEBRAL ARTERIES: No focal stenosis or dissection. Dominant right and smaller left vertebral arteries. AORTIC ARCH: Vascular variant aortic arch origin left vertebral artery.  No significant stenosis at the origin of the great vessels. NONVASCULAR STRUCTURES: Right upper lobe airspace infiltrate. Underlying mild centrilobular emphysema.     IMPRESSION: HEAD CT: 1.  No CT evidence of acute intracranial hemorrhage, mass or recent infarct. 2.  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 3.  Mild volume loss and presumed chronic small vessel ischemic changes. HEAD CTA: 1.  No major vessel acute thromboembolism, flow-limiting stenosis or occlusion. 2.  Evidence of mild intracranial atherosclerosis. NECK CTA: 1.  Normal neck CTA. 2.  Evidence of a right upper lobe infiltrate. Correlate for pneumonia. 3.  Underlying mild centrilobular emphysema. Results were called to Dr. Park at 12:39 AM on 02/07/2022.    CT Head w/o Contrast    Result Date: 2/8/2022  EXAM: CT HEAD W/O CONTRAST LOCATION: [facility]  [facility] DATE/TIME: 2/8/2022 1:22 PM INDICATION: Left-sided weakness. COMPARISON: Head and neck CTA 02/07/2022. TECHNIQUE: Routine CT Head without IV contrast. Multiplanar reformats. Dose reduction techniques were used. FINDINGS: INTRACRANIAL CONTENTS: There is no evidence of acute large territorial infarction, acute intracranial hemorrhage, or mass lesion. Chronic infarcts in the bilateral cerebellar hemispheres appear unchanged from the prior study. Mild scattered nonspecific hypodensity in the cerebral white matter appears stable from the prior study. Mild prominence of the ventricles, sulci, and cisterns consistent with mild diffuse brain parenchymal volume loss appears stable from the prior study. There is no evidence of hydrocephalus. VISUALIZED ORBITS/SINUSES/MASTOIDS: No intraorbital abnormality. There is a tiny mucous retention cyst in the right sphenoid sinus. The remainder of the visualized paranasal sinuses are clear. There is trace opacification of the inferior left mastoid air  cells. The right mastoid air cells are clear. BONES/SOFT TISSUES: No acute abnormality.     IMPRESSION: 1.  No evidence of acute large territorial infarction, acute intracranial hemorrhage, or mass lesion. 2.  Stable chronic bilateral cerebellar infarcts. 3.  Stable mild chronic small vessel ischemic change and mild diffuse brain parenchymal volume loss.    XR Video Swallow with SLP or OT    Result Date: 2/7/2022  EXAM: XR VIDEO SWALLOW WITH SLP OR OT LOCATION: [facility] DATE/TIME: 2/7/2022 11:35 AM INDICATION: Difficulty swallowing. COMPARISON: None. TECHNIQUE: Routine swallow study with speech pathology using multiple barium thicknesses. FINDINGS: FLUOROSCOPIC TIME: 1 minutes. NUMBER OF IMAGES: 0 Swallow study with Speech Pathology using multiple barium thicknesses. Trace amount of aspiration is seen with thin barium consistency. No other evidence of laryngeal penetration or  aspiration is seen with other barium consistencies. Please see dedicated Speech Pathology report for further details of examination.     EEG Awake or Drowsy Routine    Result Date: 2/8/2022  EEG report DATE Feb 8, 2022 CLINICAL HISTORY This is a 76 year old female with asterixis. The EEG is done to look for underlying epilepsy. INTRODUCTION This is a routine EEG performed utilizing standard 10- 20 system of electrode placement with 20 channels of recording including one channel of EKG monitor. The patient was studied in awake and drowsy state. Photic stimulation was done. EEG TIME: 29 min DESCRIPTION OF THE RECORD The EEG background consists of a 9 to 10 Hz posterior dominant rhythm that is symmetric. No convincing epileptiform abnormalities were noted. Normal responses noted to photic stimulation. Mild generalized background dysrhythmia is present. IMPRESSION/REPORT/PLAN This is a normal EEG during wakefulness and drowsiness except for mild generalized background dysrhythmia. EEG is not suggestive of ongoing seizures or encephalopathy. Further clinical correlation is needed. Please note that the absence of epileptiform abnormalities does not exclude the possibility of epilepsy in any patient.

## 2022-02-09 NOTE — PROGRESS NOTES
NEUROLOGY PROGRESS NOTE     Julisa Chaney,  1945, MRN [id number] Date: 2022     [facility]   Code status:  Full Code   PCP: Mara Murillo, [phone]      ASSESSMENT & PLAN   Diagnosis code: Left arm weakness/asterixis    Left arm weakness/asterixis  Spells of slurred speech  Complains of generalized weakness with fever  Low B12      Patient has unilateral asterixis in the left upper extremity which could be secondary to a structural lesion or could be more metabolic.  Epileptic lesions are also a possibility.  This is now resolved.    Head CT negative for acute stroke    Repeat Head CT negative for acute stroke-do not suspect cerebrovascular disease    Could start on aspirin 81 mg if tolerated    Will hold off on MRI for right now.    CT angiogram negative for any significant large vessel occlusion stenosis    EEG negative for epilepsy    Electrolytes noncontributory.  Ammonia normal.    Hepatic panel, UA noncontributory    Also check B12/TSH--B12 significantly low; recommend replacement    PT/OT    Hold off on antiepileptic medication since asterixis not resolved    Seizure precautions    Possibly asterixis related to B12 deficiency versus ongoing infection    Discharge:-Possibly tomorrow.  Per primary team.       Chief Complaint   Patient presents with     Generalized Weakness     Decreased Appetite     Slurred Speech        HISTORY OF PRESENT ILLNESS     We have been requested by Dr. Ledesma to evaluate Julisa Chaney who is a 76 year old  female for left-sided weakness.  Patient is unable to provide limited history.  She reports she woke up and her arms were shaking she could not grab of anything.  She has not had similar symptoms in the past.  Reports no chest pains palpitations or headaches.  Does not take aspirin at baseline.  No other blood thinners.  Per patient she was having some difficulty finding words as well.  Currently she reports symptoms have improved though continues to have  issues with her left arm.  Presentation to the ER and stroke code was called the patient was done in the window for IV thrombolytics.  CT angiogram not show any large vessel occlusion/stenosis.  MRI brain is currently pending even though the patient reports that she had this done this morning.    Per the admission H&P:-Patient does have a history of migraines who presented with generalized weakness.  Has been having progressive weakness for the last 3 days associated with poor appetite and diarrhea which is now resolved.  Patient reports more left upper extremity weakness on yesterday.  Also had episodes of slurred speech and forgetfulness over the last few days.  Noted to be febrile in the ED the patient did not notice that.  Denies any vision changes chest pain shortness of breath palpitations.    2/8  Patient adamant that she will not do the MRI without sedation.  She needs to be completely knocked out.  She is willing to do a head CT today.  EEG also done this morning.  Reports that she is feeling much more alert.  Reports no other new issues.     PAST MEDICAL & SURGICAL HISTORY     Medical History  Past Medical History:   Diagnosis Date     High cholesterol      Migraines      Sepsis (H) 8/10/2020     Surgical History  Past Surgical History:   Procedure Laterality Date     HYSTERECTOMY       [facility] ESOPHAGOGASTRODUODENOSCOPY TRANSORAL DIAGNOSTIC N/A 8/13/2020    Procedure: ESOPHAGOGASTRODUODENOSCOPY (EGD);  Surgeon: Nathan Smalls MD;  Location: [facility] [place];  Service: Gastroenterology     VA ESOPHAGOGASTRODUODENOSCOPY TRANSORAL DIAGNOSTIC N/A 8/14/2020    Procedure: ESOPHAGOGASTRODUODENOSCOPY (EGD), PYLORIC DILATION;  Surgeon: Nathan Smalls MD;  Location: [facility] OR;  Service: Gastroenterology     [facility] COLONOSCOPY W/WO BRUSH/WASH N/A 8/13/2020    Procedure: COLONOSCOPY WITH POLYPECTOMY;  Surgeon: Nathan Smalls MD;  Location: [facility] OR;  Service: Gastroenterology        SOCIAL HISTORY  "    Social History     Tobacco Use     Smoking status: Former Smoker     Packs/day: 1.00     Years: 50.00     Pack years: 50.00     Quit date: 2014     Years since quittin.2     Smokeless tobacco: Never Used   Substance Use Topics     Alcohol use: No     Drug use: No        FAMILY HISTORY     Reviewed, and family history includes Breast Cancer in her maternal aunt, mother, sister, and sister; Cancer in her father; Testicular cancer (age of onset: 17.00) in her grandchild.     ALLERGIES     Allergies   Allergen Reactions     Contrast [Iohexol] Rash     Noticed during 2020 admission. Received IV contrast on      Chocolate Headache     Mirtazapine Other (See Comments)     \"Needles to the face\"     Penicillins Rash        REVIEW OF SYSTEMS     12 system ROS was noted in the HPI this was negative.  Pertinent positives noted in the HPI.     HOME & HOSPITAL MEDICATIONS     Prior to Admission Medications  Medications Prior to Admission   Medication Sig Dispense Refill Last Dose     acetaminophen-codeine (TYLENOL #3) 300-30 mg per tablet [ACETAMINOPHEN-CODEINE (TYLENOL #3) 300-30 MG PER TABLET] Take 1 tablet by mouth every 6 (six) hours as needed for pain. 120 tablet 0 Past Week at Unknown time     ARTIFICIAL TEARS 1.4 % ophthalmic solution INSTILL 2 DROPS IN BOTH EYES AS NEEDED (Patient taking differently: Place 2 drops into both eyes every hour as needed ) 15 mL 0 Past Week at Unknown time     Aspirin-Caffeine 400-32 MG TABS Take 1 tablet by mouth daily as needed   Past Week at Unknown time     cholecalciferol, vitamin D3, 50 mcg (2,000 unit) Tab [CHOLECALCIFEROL, VITAMIN D3, 50 MCG (2,000 UNIT) TAB] Take 1 tablet (2,000 Units total) by mouth daily. One tab daily 90 tablet 4 2022 at Unknown time     desonide (DESOWEN) 0.05 % cream [DESONIDE (DESOWEN) 0.05 % CREAM] Apply to affected area 2 times daily (Patient taking differently: Apply topically 2 times daily as needed ) 60 g 1 Past Month at Unknown time " "    ferrous sulfate 325 (65 FE) MG tablet [FERROUS SULFATE 325 (65 FE) MG TABLET] Take 1 tablet (325 mg total) by mouth daily with breakfast. 30 tablet 0 Past Month at Unknown time     multivitamin (DAILY-DAVID) per tablet [MULTIVITAMIN (DAILY-DAVID) PER TABLET] Take 1 tablet by mouth daily. 100 tablet 3 Past Week at Unknown time     nortriptyline (PAMELOR) 25 MG capsule TAKE 2 CAPSULES BY MOUTH TWICE DAILY (Patient taking differently: Take 50 mg by mouth 2 times daily ) 360 capsule 1 2/6/2022 at AM     omeprazole (PRILOSEC) 40 MG DR capsule TAKE 1 CAPSULE(40 MG) BY MOUTH DAILY BEFORE BREAKFAST (Patient taking differently: Take 40 mg by mouth daily ) 90 capsule 1 2/6/2022 at AM     OXcarbazepine (TRILEPTAL) 150 MG tablet TAKE 3 TABLETS(450 MG) BY MOUTH AT BEDTIME (Patient taking differently: Take 450 mg by mouth At Bedtime ) 270 tablet 2 2/5/2022 at PM     topiramate (TOPAMAX) 50 MG tablet TAKE 1 TABLET BY MOUTH EVERY DAY (Patient taking differently: Take 50 mg by mouth At Bedtime ) 90 tablet 2 2/5/2022 at PM       Hospital Medications    azithromycin  500 mg Intravenous Q24H     [Held by provider] cefTRIAXone  1 g Intravenous Q24H     ferrous sulfate  325 mg Oral Daily with breakfast     LORazepam  1 mg Intravenous Once     nortriptyline  50 mg Oral BID     OXcarbazepine  450 mg Oral At Bedtime     pantoprazole  40 mg Oral QAM AC     sodium chloride (PF)  3 mL Intracatheter Q8H     topiramate  50 mg Oral At Bedtime     vancomycin  20 mg/kg Intravenous Q18H        PHYSICAL EXAM     Vital signs  Temp:  [97.8  F (36.6  C)-98.4  F (36.9  C)] 97.8  F (36.6  C)  Pulse:  [66-80] 80  Resp:  [16-28] 18  BP: (103-132)/(52-61) 117/54  SpO2:  [95 %-100 %] 95 %    PHYSICAL EXAMINATION  VITALS: /54 (BP Location: Right arm)   Pulse 80   Temp 97.8  F (36.6  C) (Oral)   Resp 18   Ht 1.676 m (5' 6\")   Wt 52.2 kg (115 lb)   SpO2 95%   BMI 18.56 kg/m    GENERAL -Health appearing, No apparent distress  EYES- No scleral " icterus, no eyelid droop, Pupils - see Neuro section  HEENT - Normocephalic, atraumatic, Hearing grossly intact; Oral mucosa moist and pink in color. External Ears and nose intact.   Neck - supple with no obvious lymphadenopathy or thyromegaly  PULM - Good spontaneous respiratory effort; Normal palpation of chest.   CV- Pedal pulses present with no peripheral edema/ No significant varicosities.  MSK- Gait - see Neuro section; Strength and tone- see Neuro section; Range of motion grossly intact.  PSYCH -cooperative    Neurological  Mental status - Patient is awake and oriented to self, place and time. Attention span is normal. Language is fluent and follows commands appropriately.   Cranial nerves - CN II-XII intact. Pupils are reactive and symmetric; EOMI, VFIT, NLF symmetric  Motor - There is no pronator drift. Motor exam shows 5/5 strength in all extremities.  Tone - Tone is symmetric bilaterally in upper and lower extremities.  Asterixis resolved this morning.  Coordination - Finger to nose and heel to shin is without dysmetria except in the left arm due to the asterixis  Gait and station --unable to safely ambulate.  Formal gait testing cannot be done due to safety concerns from ongoing medical issues.  Asterixis is now resolved.  No other significant change in exam today.       DIAGNOSTIC STUDIES     Pertinent Radiology   CTA  HEAD CT:  1.  No CT evidence of acute intracranial hemorrhage, mass or recent infarct.  2.  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  3.  Mild volume loss and presumed chronic small vessel ischemic changes.     HEAD CTA:   1.  No major vessel acute thromboembolism, flow-limiting stenosis or occlusion.  2.  Evidence of mild intracranial atherosclerosis.     NECK CTA:  1.  Normal neck CTA.  2.  Evidence of a right upper lobe infiltrate. Correlate for pneumonia.  3.  Underlying mild centrilobular emphysema.    ECHO 2020  . Normal left ventricular size and systolic performance  with a visually estimated ejection fraction of 60-65%.   2. There is mild aortic insufficiency.   3. Normal right ventricular size and systolic performance.  4. No obvious valvular vegetations identified on this study.  5. Right ventricular systolic pressure relative to right atrial pressure is moderately increased.  The pulmonary artery pressure is estimated to be 50 mmHg plus right atrial pressure (the IVC is of fairly normal caliber).     Head CT  IMPRESSION:  1.  No evidence of acute large territorial infarction, acute intracranial hemorrhage, or mass lesion.  2.  Stable chronic bilateral cerebellar infarcts.  3.  Stable mild chronic small vessel ischemic change and mild diffuse brain parenchymal volume loss.    EEG  This is a normal EEG during wakefulness and drowsiness except for mild generalized background dysrhythmia. EEG is not suggestive of ongoing seizures or encephalopathy. Further clinical correlation is needed.     Please note that the absence of epileptiform abnormalities does not exclude the possibility of epilepsy in any patient.     Recent Results (from the past 24 hour(s))   Vitamin B12    Collection Time: 02/08/22  7:43 AM   Result Value Ref Range    Vitamin B12 162 (L) 213 - 816 pg/mL   Basic metabolic panel    Collection Time: 02/08/22  7:43 AM   Result Value Ref Range    Sodium 143 136 - 145 mmol/L    Potassium 3.1 (L) 3.5 - 5.0 mmol/L    Chloride 112 (H) 98 - 107 mmol/L    Carbon Dioxide (CO2) 22 22 - 31 mmol/L    Anion Gap 9 5 - 18 mmol/L    Urea Nitrogen 8 8 - 28 mg/dL    Creatinine 0.58 (L) 0.60 - 1.10 mg/dL    Calcium 7.8 (L) 8.5 - 10.5 mg/dL    Glucose 94 70 - 125 mg/dL    GFR Estimate >90 >60 mL/min/1.73m2   Ammonia    Collection Time: 02/08/22  7:43 AM   Result Value Ref Range    Ammonia 21 11 - 35 umol/L   Procalcitonin    Collection Time: 02/08/22  7:43 AM   Result Value Ref Range    Procalcitonin 0.03 0.00 - 0.49 ng/mL   CBC with platelets and differential    Collection Time:  02/08/22  7:43 AM   Result Value Ref Range    WBC Count 9.2 4.0 - 11.0 10e3/uL    RBC Count 3.90 3.80 - 5.20 10e6/uL    Hemoglobin 9.1 (L) 11.7 - 15.7 g/dL    Hematocrit 30.7 (L) 35.0 - 47.0 %    MCV 79 78 - 100 fL    MCH 23.3 (L) 26.5 - 33.0 pg    MCHC 29.6 (L) 31.5 - 36.5 g/dL    RDW 18.0 (H) 10.0 - 15.0 %    Platelet Count 391 150 - 450 10e3/uL    % Neutrophils 74 %    % Lymphocytes 17 %    % Monocytes 5 %    % Eosinophils 4 %    % Basophils 0 %    % Immature Granulocytes 0 %    NRBCs per 100 WBC 0 <1 /100    Absolute Neutrophils 6.9 1.6 - 8.3 10e3/uL    Absolute Lymphocytes 1.5 0.8 - 5.3 10e3/uL    Absolute Monocytes 0.4 0.0 - 1.3 10e3/uL    Absolute Eosinophils 0.3 0.0 - 0.7 10e3/uL    Absolute Basophils 0.0 0.0 - 0.2 10e3/uL    Absolute Immature Granulocytes 0.0 <=0.4 10e3/uL    Absolute NRBCs 0.0 10e3/uL   CRP inflammation    Collection Time: 02/08/22  7:43 AM   Result Value Ref Range    CRP 11.0 (H) 0.0-<0.8 mg/dL   Magnesium    Collection Time: 02/08/22  7:43 AM   Result Value Ref Range    Magnesium 1.8 1.8 - 2.6 mg/dL   Potassium    Collection Time: 02/08/22  3:32 PM   Result Value Ref Range    Potassium 3.4 (L) 3.5 - 5.0 mmol/L       Total time spent for face to face visit, reviewing labs/imaging studies, counseling and coordination of care was: Over 40 min More than 50% of this time was spent on counseling and coordination of care.    Counseling patient.  Discussing doing MRI with sedation versus doing a CT scan with the patient.  Reviewing CT scan and EEG results.  Getting EEG done today.    Jamie Clarke MD  Neurologist  [facility]  Tel:- [phone]

## 2022-02-09 NOTE — PROGRESS NOTES
NEUROLOGY PROGRESS NOTE     Julisa Chaney,  1945, MRN [id number] Date: 2022     [facility] [place]   Code status:  Full Code   PCP: Mara Murillo, [phone]      ASSESSMENT & PLAN   Diagnosis code: Left arm weakness/asterixis    Left arm weakness  Spells of slurred speech  Complains of generalized weakness with fever  Low B12  Bilateral asterixis      Patient has unilateral asterixis in the left upper extremity which could be secondary to a structural lesion or could be more metabolic.  Epileptic lesions are also a possibility.     Patient today on exam had bilateral asterixis though still worse on the left side compared to the right    Head CT negative for acute stroke    Repeat Head CT negative for acute stroke-do not suspect cerebrovascular disease    Could start on aspirin 81 mg if tolerated    Will hold off on MRI for right now.    CT angiogram negative for any significant large vessel occlusion stenosis    EEG negative for epilepsy    Electrolytes noncontributory.  Ammonia normal.    Hepatic panel, UA noncontributory    Check echocardiogram    Lipid panel and statin    Also check B12/TSH--B12 significantly low; recommend replacement    PT/OT    Hold off on antiepileptic medication since asterixis not resolved    Seizure precautions    Possibly asterixis related to B12 deficiency versus ongoing infection    Getting IV antibiotics.  Diagnosed with right upper lobe pneumonia    Discharge:-Per primary team.  Discharge set up for tomorrow.       Chief Complaint   Patient presents with     Generalized Weakness     Decreased Appetite     Slurred Speech        HISTORY OF PRESENT ILLNESS     We have been requested by Dr. Ledesma to evaluate Julisa Chaney who is a 76 year old  female for left-sided weakness.  Patient is unable to provide limited history.  She reports she woke up and her arms were shaking she could not grab of anything.  She has not had similar symptoms in the past.  Reports no chest  pains palpitations or headaches.  Does not take aspirin at baseline.  No other blood thinners.  Per patient she was having some difficulty finding words as well.  Currently she reports symptoms have improved though continues to have issues with her left arm.  Presentation to the ER and stroke code was called the patient was done in the window for IV thrombolytics.  CT angiogram not show any large vessel occlusion/stenosis.  MRI brain is currently pending even though the patient reports that she had this done this morning.    Per the admission H&P:-Patient does have a history of migraines who presented with generalized weakness.  Has been having progressive weakness for the last 3 days associated with poor appetite and diarrhea which is now resolved.  Patient reports more left upper extremity weakness on yesterday.  Also had episodes of slurred speech and forgetfulness over the last few days.  Noted to be febrile in the ED the patient did not notice that.  Denies any vision changes chest pain shortness of breath palpitations.    2/8  Patient adamant that she will not do the MRI without sedation.  She needs to be completely knocked out.  She is willing to do a head CT today.  EEG also done this morning.  Reports that she is feeling much more alert.  Reports no other new issues.    2/9  Patient reports that she feels better.  Is to have asterixis in both upper extremities.  Denies any other weakness numbness.  Head CT was done yesterday.  Is willing to do an echocardiogram today.     PAST MEDICAL & SURGICAL HISTORY     Medical History  Past Medical History:   Diagnosis Date     High cholesterol      Migraines      Sepsis (H) 8/10/2020     Surgical History  Past Surgical History:   Procedure Laterality Date     HYSTERECTOMY       SD ESOPHAGOGASTRODUODENOSCOPY TRANSORAL DIAGNOSTIC N/A 8/13/2020    Procedure: ESOPHAGOGASTRODUODENOSCOPY (EGD);  Surgeon: Nathan Smalls MD;  Location: [facility] OR;  Service:  "Gastroenterology     [place] ESOPHAGOGASTRODUODENOSCOPY TRANSORAL DIAGNOSTIC N/A 2020    Procedure: ESOPHAGOGASTRODUODENOSCOPY (EGD), PYLORIC DILATION;  Surgeon: Nathan Smalls MD;  Location: [facility];  Service: Gastroenterology     [facility] COLONOSCOPY W/WO BRUSH/WASH N/A 2020    Procedure: COLONOSCOPY WITH POLYPECTOMY;  Surgeon: Nathan Smalls MD;  Location: [facility];  Service: Gastroenterology        SOCIAL HISTORY     Social History     Tobacco Use     Smoking status: Former Smoker     Packs/day: 1.00     Years: 50.00     Pack years: 50.00     Quit date: 2014     Years since quittin.2     Smokeless tobacco: Never Used   Substance Use Topics     Alcohol use: No     Drug use: No        FAMILY HISTORY     Reviewed, and family history includes Breast Cancer in her maternal aunt, mother, sister, and sister; Cancer in her father; Testicular cancer (age of onset: 17.00) in her grandchild.     ALLERGIES     Allergies   Allergen Reactions     Contrast [Iohexol] Rash     Noticed during 2020 admission. Received IV contrast on      Chocolate Headache     Mirtazapine Other (See Comments)     \"Needles to the face\"     Penicillins Rash        REVIEW OF SYSTEMS     12 system ROS was noted in the HPI this was negative.  Pertinent positives noted in the HPI.     HOME & HOSPITAL MEDICATIONS     Prior to Admission Medications  Medications Prior to Admission   Medication Sig Dispense Refill Last Dose     acetaminophen-codeine (TYLENOL #3) 300-30 mg per tablet [ACETAMINOPHEN-CODEINE (TYLENOL #3) 300-30 MG PER TABLET] Take 1 tablet by mouth every 6 (six) hours as needed for pain. 120 tablet 0 Past Week at Unknown time     ARTIFICIAL TEARS 1.4 % ophthalmic solution INSTILL 2 DROPS IN BOTH EYES AS NEEDED (Patient taking differently: Place 2 drops into both eyes every hour as needed ) 15 mL 0 Past Week at Unknown time     Aspirin-Caffeine 400-32 MG TABS Take 1 tablet by mouth daily as needed   " Past Week at Unknown time     cholecalciferol, vitamin D3, 50 mcg (2,000 unit) Tab [CHOLECALCIFEROL, VITAMIN D3, 50 MCG (2,000 UNIT) TAB] Take 1 tablet (2,000 Units total) by mouth daily. One tab daily 90 tablet 4 2/6/2022 at Unknown time     desonide (DESOWEN) 0.05 % cream [DESONIDE (DESOWEN) 0.05 % CREAM] Apply to affected area 2 times daily (Patient taking differently: Apply topically 2 times daily as needed ) 60 g 1 Past Month at Unknown time     ferrous sulfate 325 (65 FE) MG tablet [FERROUS SULFATE 325 (65 FE) MG TABLET] Take 1 tablet (325 mg total) by mouth daily with breakfast. 30 tablet 0 Past Month at Unknown time     multivitamin (DAILY-DAVID) per tablet [MULTIVITAMIN (DAILY-DAVID) PER TABLET] Take 1 tablet by mouth daily. 100 tablet 3 Past Week at Unknown time     nortriptyline (PAMELOR) 25 MG capsule TAKE 2 CAPSULES BY MOUTH TWICE DAILY (Patient taking differently: Take 50 mg by mouth 2 times daily ) 360 capsule 1 2/6/2022 at AM     omeprazole (PRILOSEC) 40 MG DR capsule TAKE 1 CAPSULE(40 MG) BY MOUTH DAILY BEFORE BREAKFAST (Patient taking differently: Take 40 mg by mouth daily ) 90 capsule 1 2/6/2022 at AM     OXcarbazepine (TRILEPTAL) 150 MG tablet TAKE 3 TABLETS(450 MG) BY MOUTH AT BEDTIME (Patient taking differently: Take 450 mg by mouth At Bedtime ) 270 tablet 2 2/5/2022 at PM     topiramate (TOPAMAX) 50 MG tablet TAKE 1 TABLET BY MOUTH EVERY DAY (Patient taking differently: Take 50 mg by mouth At Bedtime ) 90 tablet 2 2/5/2022 at PM       Hospital Medications    aspirin  81 mg Oral Daily     azithromycin  500 mg Intravenous Q24H     [Held by provider] cefTRIAXone  1 g Intravenous Q24H     cyanocobalamin  1,000 mcg Sublingual Daily     ferrous sulfate  325 mg Oral Daily with breakfast     [START ON 2/10/2022] influenza vac high-dose quad  0.7 mL Intramuscular Prior to discharge     LORazepam  1 mg Intravenous Once     nortriptyline  50 mg Oral BID     OXcarbazepine  450 mg Oral At Bedtime      "pantoprazole  40 mg Oral QAM AC     sodium chloride (PF)  3 mL Intracatheter Q8H     topiramate  50 mg Oral At Bedtime     vancomycin  20 mg/kg Intravenous Q18H        PHYSICAL EXAM     Vital signs  Temp:  [97.8  F (36.6  C)-98.3  F (36.8  C)] 97.8  F (36.6  C)  Pulse:  [70-82] 70  Resp:  [18] 18  BP: (101-117)/(54-59) 104/59  SpO2:  [93 %-97 %] 97 %    PHYSICAL EXAMINATION  VITALS: /59 (BP Location: Right arm)   Pulse 70   Temp 97.8  F (36.6  C) (Oral)   Resp 18   Ht 1.676 m (5' 6\")   Wt 52.2 kg (115 lb)   SpO2 97%   BMI 18.56 kg/m    GENERAL -Health appearing, No apparent distress  EYES- No scleral icterus, no eyelid droop, Pupils - see Neuro section  HEENT - Normocephalic, atraumatic, Hearing grossly intact; Oral mucosa moist and pink in color. External Ears and nose intact.   Neck - supple with no obvious lymphadenopathy or thyromegaly  PULM - Good spontaneous respiratory effort; Normal palpation of chest.   CV- Pedal pulses present with no peripheral edema/ No significant varicosities.  MSK- Gait - see Neuro section; Strength and tone- see Neuro section; Range of motion grossly intact.  PSYCH -cooperative    Neurological  Mental status - Patient is awake and oriented to self, place and time. Attention span is normal. Language is fluent and follows commands appropriately.   Cranial nerves - CN II-XII intact. Pupils are reactive and symmetric; EOMI, VFIT, NLF symmetric  Motor - There is no pronator drift. Motor exam shows 5/5 strength in all extremities.  Tone - Tone is symmetric bilaterally in upper and lower extremities.  Mild asterixis in both extremities.  Left worse than right  Coordination - Finger to nose and heel to shin is without dysmetria except in the left arm due to the asterixis  Gait and station --unable to safely ambulate.  Formal gait testing cannot be done due to safety concerns from ongoing medical issues.  Asterixis is present today.  No change in exam otherwise.       DIAGNOSTIC " STUDIES     Pertinent Radiology   CTA  HEAD CT:  1.  No CT evidence of acute intracranial hemorrhage, mass or recent infarct.  2.  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  3.  Mild volume loss and presumed chronic small vessel ischemic changes.     HEAD CTA:   1.  No major vessel acute thromboembolism, flow-limiting stenosis or occlusion.  2.  Evidence of mild intracranial atherosclerosis.     NECK CTA:  1.  Normal neck CTA.  2.  Evidence of a right upper lobe infiltrate. Correlate for pneumonia.  3.  Underlying mild centrilobular emphysema.    ECHO 2020  . Normal left ventricular size and systolic performance with a visually estimated ejection fraction of 60-65%.   2. There is mild aortic insufficiency.   3. Normal right ventricular size and systolic performance.  4. No obvious valvular vegetations identified on this study.  5. Right ventricular systolic pressure relative to right atrial pressure is moderately increased.  The pulmonary artery pressure is estimated to be 50 mmHg plus right atrial pressure (the IVC is of fairly normal caliber).     Head CT  IMPRESSION:  1.  No evidence of acute large territorial infarction, acute intracranial hemorrhage, or mass lesion.  2.  Stable chronic bilateral cerebellar infarcts.  3.  Stable mild chronic small vessel ischemic change and mild diffuse brain parenchymal volume loss.    EEG  This is a normal EEG during wakefulness and drowsiness except for mild generalized background dysrhythmia. EEG is not suggestive of ongoing seizures or encephalopathy. Further clinical correlation is needed.     Please note that the absence of epileptiform abnormalities does not exclude the possibility of epilepsy in any patient.     Recent Results (from the past 24 hour(s))   Potassium    Collection Time: 02/08/22  3:32 PM   Result Value Ref Range    Potassium 3.4 (L) 3.5 - 5.0 mmol/L   Potassium    Collection Time: 02/08/22  9:04 PM   Result Value Ref Range    Potassium 3.6 3.5  - 5.0 mmol/L   Magnesium    Collection Time: 02/09/22  7:47 AM   Result Value Ref Range    Magnesium 1.7 (L) 1.8 - 2.6 mg/dL   Potassium    Collection Time: 02/09/22  7:47 AM   Result Value Ref Range    Potassium 3.7 3.5 - 5.0 mmol/L       Total time spent for face to face visit, reviewing labs/imaging studies, counseling and coordination of care was: Over 30 min More than 50% of this time was spent on counseling and coordination of care.    Counseling patient.  Discussing head CT results and discussing of getting an echocardiogram.    Jamie Clarke MD  Neurologist  [facility] Neurology [facility]  Tel:- [phone]

## 2022-02-09 NOTE — PROGRESS NOTES
"[facility]    PROGRESS NOTE - Hospitalist Service    Assessment and Plan    Patient is new to me, Julisa Chaney is a 76 year old female with past medical history of hyperlipidemia, migraine who presents with fever, generalized weakness, she was found to to have pneumonia.      Generalized weakness/tremors and asterixis  - Generalized weakness intermittent episodes of slurred speech, reports left side more weak than the right.  -Initially stroke code was called at ED, then the deescalated  -CT and CT angiogram no acute changes or major vessel occlusions, also with no objective focal deficits on exam  - Patient declined MRI, she wants to be \" totally out\" just like when she had her colonoscopy, declined to try even with Ativan.   -Repeated CT head is negative for significant acute changes   - Neurology consult, appreciate input  -EEG is negative for epilepsy  - Pending echo     Right upper lobe aspiration pneumonia  -Present on chest x-ray and CT chest  - Patient also has aspiration on speech evaluation  -WBC 16.3, chest x-ray with right perihilar infiltrate- no productive cough, no fever; procalcitonin wnl  -Started initially on ceftriaxone and azithromycin, incentive spirometry started on admission -> developed rash allover; hold rocephin (h/o PCN allergy) and continue Zithromax  - blood cx positive for GPC in cluster; bacteriemia or contamination-> oncall doc started on Vanco-  -repeat chest x-ray shows increased opacity in the right mid/upper lung consistent with right-sided pneumonia  - ID consultation, appreciate input  -Continue vancomycin pending culture results     Mild aspiration  - Speech therapy fo swallow eval and order diet accordingly     Hypokalemia and hypomagnesemia  -replace as per protocol   -Continue to monitor level     Acute skin rash  -Patient claims that she is allergic to iv contrast for ct vs to Rocephin (h/o pcn allergy with rash)  - Improving, continue Benadryl " prn     Iron deficiency anemia  -Resume oral iron, monitor Hgb serially    Weakness and deconditioning  - PT/OT evaluation  - Patient would need TCU on discharge    Active Problems:    Left-sided weakness    Pneumonia of right upper lobe due to infectious organism      VTE prophylaxis:  Enoxaparin (Lovenox) SQ  DIET: Orders Placed This Encounter      Combination Diet Easy to Chew (level 7); Thin Liquids (level 0); No Straws      Disposition/Barriers to discharge: IV antibiotic, pending culture  Code Status: Full Code    Subjective:  Julisa is feeling better today, denies any chest pain, still has mild shortness of breath.  No fever or chills.    PHYSICAL EXAM  Vitals:    02/06/22 2339 02/07/22 2049   Weight: 51.3 kg (113 lb) 52.2 kg (115 lb)     B/P:104/53 T:97.9 P:70 R:16     Intake/Output Summary (Last 24 hours) at 2/9/2022 1639  Last data filed at 2/9/2022 1300  Gross per 24 hour   Intake 483 ml   Output 1875 ml   Net -1392 ml      Body mass index is 18.56 kg/m .    Constitutional: awake, alert, cooperative, no apparent distress, and appears stated age  Eyes: Lids and lashes normal, pupils equal, round and reactive to light, extra ocular muscles intact, sclera clear, conjunctiva normal  ENT: Normocephalic, without obvious abnormality, atraumatic, sinuses nontender on palpation, external ears without lesions, oral pharynx with moist mucous membranes, tonsils without erythema or exudates, gums normal and good dentition.  Respiratory: No increased work of breathing, good air exchange, clear to auscultation bilaterally, no crackles or wheezing  Cardiovascular: Normal apical impulse, regular rate and rhythm, normal S1 and S2, no S3 or S4, and no murmur noted  GI: No scars, normal bowel sounds, soft, non-distended, non-tender, no masses palpated, no hepatosplenomegally  Skin: Diffuse erythema with allergic rash all over her body.  Musculoskeletal: There is no redness, warmth, or swelling of the joints.  Full range of  motion noted.  no lower extremity pitting edema present  Neurologic: Awake, alert, oriented to name, place and time.  Cranial nerves II-XII are grossly intact.  Motor is 5 out of 5 bilaterally.   Sensory is intact.    Neuropsychiatric: Appropriate with examiner      PERTINENT LABS/IMAGING:  Recent Labs   Lab 22  0747 22  2104 22  1532 22  0743 22  0019 22  0012 22  0011 22  0011 22  6983   WBC  --   --   --  9.2  --   --   --  16.3*  --    HGB  --   --   --  9.1*  --   --   --  10.3*  --    MCV  --   --   --  79  --   --   --  81  --    PLT  --   --   --  391  --   --   --  382  --    INR  --   --   --   --   --  1.05  --   --   --    NA  --   --   --  143  --   --   --  139  --    POTASSIUM 3.7 3.6 3.4* 3.1*  --   --    < > 3.7  --    CHLORIDE  --   --   --  112*  --   --   --  106  --    CO2  --   --   --  22  --   --   --  25  --    BUN  --   --   --  8  --   --   --  12  --    CR  --   --   --  0.58* 0.6  --   --  0.73  --    ANIONGAP  --   --   --  9  --   --   --  8  --    ADY  --   --   --  7.8*  --   --   --  8.7  --    GLC  --   --   --  94  --   --   --  138* 134*   ALBUMIN  --   --   --   --   --   --   --  3.3*  --    PROTTOTAL  --   --   --   --   --   --   --  6.8  --    BILITOTAL  --   --   --   --   --   --   --  0.2  --    ALKPHOS  --   --   --   --   --   --   --  138*  --    ALT  --   --   --   --   --   --   --  17  --    AST  --   --   --   --   --   --   --  14  --     < > = values in this interval not displayed.     Recent Results (from the past 24 hour(s))   Echocardiogram Complete    Narrative    [id number]  [id number]  [id number]  [id number]^LETICIA^[place] [address]     Name: CHELITA SAWANT  MRN: [id number]  : 1945  Study Date: 2022 11:22 AM  Age: 76 yrs  Gender: Female  Patient Location: [facility]  Reason For Study: Arterial Embolism and Thrombosis  Ordering Physician: LETICIA  HARSH  Performed By: DANNY     BSA: 1.6 m2  Height: 66 in  Weight: 115 lb  HR: 77  ______________________________________________________________________________  Procedure  Complete Portable Echo Adult. Complete Portable Bubble Echo Adult.  ______________________________________________________________________________  Interpretation Summary     1. Normal left ventricular size and systolic performance with a visually  estimated ejection fraction of 70-75%.  2. There is mild to moderate aortic insufficiency.  3. There is mild, to perhaps mild-moderate, tricuspid insufficiency.  4. Normal right ventricular size and systolic performance.  5. There is mild left atrial enlargement.  6. Right ventricular systolic pressure relative to right atrial pressure is  mildly increased. The pulmonary artery pressure is estimated to be 40 mmHg  plus right atrial pressure (the IVC is of normal caliber).  7. Echo contrast examination was performed using agitated NS as contrast  agent. The right heart opacity was good and the left heart was well  visualized. There was no evidence of right to left shunting during spontaneous  respiration or following release of Valsalva.     When compared to the prior real-time echocardiogram dated 10 August 2020,  there has been a slight increase in the degree of aortic insufficiency. There  has been a mild decrease in the pulmonary artery pressure estimate. Otherwise,  the findings are felt to be fairly similar on both examinations.  ______________________________________________________________________________  Left ventricle:  Normal left ventricular size and systolic performance with a visually  estimated ejection fraction of 70-75%. There is normal regional wall motion.  Left ventricular wall thickness is normal.     Assessment of LV Diastolic Function: The cumulative findings suggest impaired  diastolic filling [The septal e' velocity is > 7 cm/s & lateral e' velocity  is  < 10 cm/s. The average  E/e' is < 14. TR velocity is > 2.8 m/s. Left atrial  volume index is greater than 34 mL/mÂ ].     Assessment of left atrial pressure (LAP): The cumulative findings suggest  moderately elevated left atrial pressure (the E/A is > 0.8 and <2.0 plus 2 or  3 of 3 of the following present: Average E/e' > 14, TRvel > 2.8 m/s, and/or LA  vol. index > 34 ml/mÂ  ).     Right ventricle:  Normal right ventricular size and systolic performance.     Left atrium:  There is mild left atrial enlargement.     Right atrium:  The right atrium is of normal size.     IVC:  The IVC is of normal caliber.     Aortic valve:  The aortic valve is comprised of three cusps. There is no significant aortic  stenosis. There is mild to moderate aortic insufficiency.     Mitral valve:  The mitral valve appears morphologically normal. There is trace mitral  insufficiency.     Tricuspid valve:  The tricuspid valve is grossly morphologically normal. There is mild, to  perhaps mild-moderate, tricuspid insufficiency.     Pulmonic valve:  The pulmonic valve is grossly morphologically normal.     Thoracic aorta:  The aortic root and proximal ascending aorta are of normal dimension.     Pericardium:  There is no significant pericardial effusion.  ______________________________________________________________________________  ______________________________________________________________________________  MMode/2D Measurements & Calculations  IVSd: 1.2 cm  LVIDd: 3.8 cm  LVIDs: 2.4 cm  LVPWd: 0.77 cm  FS: 36.5 %  LV mass(C)d: 115.8 grams  LV mass(C)dI: 73.3 grams/m2  Ao root diam: 2.9 cm  LA dimension: 3.1 cm  asc Aorta Diam: 2.8 cm  LA/Ao: 1.1  LVOT diam: 1.8 cm  LVOT area: 2.6 cm2     LA Volume Indexed (AL/bp): 42.0 ml/m2  RWT: 0.41     Doppler Measurements & Calculations  MV E max mikal: 118.0 cm/sec  MV A max mikal: 136.5 cm/sec  MV E/A: 0.86  MV dec slope: 516.2 cm/sec2  MV dec time: 0.23 sec  Ao V2 max: 188.9 cm/sec  Ao max P.0 mmHg  Ao V2 mean: 119.7  cm/sec  Ao mean P.9 mmHg  Ao V2 VTI: 39.2 cm  MARIE(I,D): 1.9 cm2  MARIE(V,D): 2.0 cm2  AI P1/2t: 671.3 msec  LV V1 max P.8 mmHg  LV V1 max: 148.1 cm/sec  LV V1 VTI: 28.7 cm  SV(LVOT): 74.6 ml  SI(LVOT): 47.2 ml/m2  PA V2 max: 0.48 cm/sec  PA max P.00 mmHg  PA acc time: 0.10 sec  TR max dave: 315.8 cm/sec  TR max P.9 mmHg  AV Dave Ratio (DI): 0.78  MARIE Index (cm2/m2): 1.2  E/E' av.5  Lateral E/e': 13.9     Medial E/e': 11.2     ______________________________________________________________________________  Report approved by: Denny Howe 2022 12:57 PM             Discussed with patient, family, ID, nursing staff and discharge planner    Michael Hernandez MD  [facility] Medicine Service  [phone]

## 2022-02-09 NOTE — PROGRESS NOTES
CLINICAL NUTRITION SERVICES - ASSESSMENT NOTE     Nutrition Prescription    RECOMMENDATIONS FOR MDs/PROVIDERS TO ORDER:    Malnutrition :  % Intake: < 75% > 7 d  % Weight Loss: None noted  Subcutaneous Fat Loss: Facial region:  Moderate occipital  Muscle Loss: Temporal:  severe and Thoracic region (clavicle):  moderate  Fluid Accumulation/Edema: None noted  Malnutrition Diagnosis: Moderate malnutrition in the context of acute illnes      Malnutrition Diagnosis: Moderate malnutrition  In Context of:  Acute illness or injury      Recommendations already ordered by Registered Dietitian (RD):  Pt declines supplementation at this time    Future/Additional Recommendations:       REASON FOR ASSESSMENT  Julisa Chaney is a/an 76 year old female assessed by the dietitian for Admission Nutrition Risk Screen for positive for weight loss and poor intake, low BMI  History of migraine HA, HLD  Admitted for CVA s/s, L side weakness, RUL pneumonia per CXT    NUTRITION HISTORY    Food allergies/intolerances: chocolate 'allergy'    Cultural needs or preferences none noted    Typical food/fluid intake:decreased PTA per risk screen- poor appetite  Time frame unspecified, D,and weakness PTA per H&P- ' past few days- now resolved '      CURRENT NUTRITION ORDERS  Diet: level 7 easy to chew, thin liquids    Intake/Tolerance:     Per flow sheet review, 75% intake for documented meals x 1    Pt feels her appetite is improving and also w/ more food choices w/ consistency level    Pt reports she does not like liquid supplements and declined trial of magic cup or other alternatives    LABS: reviewed including:  Potassium (mmol/L)   Date Value   02/09/2022 3.7   02/08/2022 3.6   02/08/2022 3.4 (L)    Blood Glucose  Glucose (mg/dL)   Date Value   02/08/2022 94   02/07/2022 138 (H)   05/04/2021 89   08/21/2020 103   08/16/2020 77     GLUCOSE BY METER POCT (mg/dL)   Date Value   02/06/2022 134 (H)    Inflammatory Markers  CRP (mg/dL)   Date Value  "  02/08/2022 11.0 (H)   08/08/2020 9.0 (H)   08/05/2020 0.4     WBC (thou/uL)   Date Value   05/04/2021 6.8   08/21/2020 9.4   08/12/2020 9.0     WBC Count (10e3/uL)   Date Value   02/08/2022 9.2   02/07/2022 16.3 (H)     Albumin (g/dL)   Date Value   02/07/2022 3.3 (L)   05/04/2021 3.4 (L)   08/21/2020 3.5     Magnesium (mg/dL)   Date Value   02/09/2022 1.7 (L)   02/08/2022 1.8   08/21/2020 1.9     Sodium (mmol/L)   Date Value   02/08/2022 143   02/07/2022 139   05/04/2021 143    Renal  Urea Nitrogen (mg/dL)   Date Value   02/08/2022 8   02/07/2022 12   05/04/2021 21     Creatinine (mg/dL)   Date Value   02/08/2022 0.58 (L)   02/07/2022 0.73   05/04/2021 0.68     Creatinine POCT (mg/dL)   Date Value   02/07/2022 0.6     Additional  Triglycerides (mg/dL)   Date Value   05/04/2021 93   05/10/2018 104   02/02/2012 63     Ketones Urine (mg/dL)   Date Value   02/07/2022 Negative       MEDICATIONS    Medications reviewed    aspirin  81 mg Oral Daily     azithromycin  500 mg Intravenous Q24H     [Held by provider] cefTRIAXone  1 g Intravenous Q24H     cyanocobalamin  1,000 mcg Sublingual Daily     ferrous sulfate  325 mg Oral Daily with breakfast     [START ON 2/10/2022] influenza vac high-dose quad  0.7 mL Intramuscular Prior to discharge     LORazepam  1 mg Intravenous Once     nortriptyline  50 mg Oral BID     OXcarbazepine  450 mg Oral At Bedtime     pantoprazole  40 mg Oral QAM AC     sodium chloride (PF)  3 mL Intracatheter Q8H     topiramate  50 mg Oral At Bedtime     vancomycin  20 mg/kg Intravenous Q18H             ANTHROPOMETRICS  Height: 5' 6\"  Weight: 52 kg   Body mass index is 18.56 kg/m .  Weight Status:  Underweight BMI <18.5    Weight History:   Wt Readings from Last 10 Encounters:   02/07/22 52.2 kg (115 lb)   05/04/21 51.4 kg (113 lb 4.8 oz)   08/21/20 49.9 kg (110 lb)   08/15/20 51.8 kg (114 lb 3.2 oz)   08/15/20 51.8 kg (114 lb 3.2 oz)   08/15/20 51.8 kg (114 lb 3.2 oz)   06/15/20 59 kg (130 lb) "   01/06/20 59.9 kg (132 lb)   09/24/19 62.9 kg (138 lb 9.6 oz)   08/21/19 61.4 kg (135 lb 6 oz)     Weight has increased in last 9 months, as noted above  Unable to assess weight changes in the last year d/t limited data    Dosing Weight: 52 kg current     ASSESSED NUTRITION NEEDS  Estimated Energy Needs: 2162-6373 kcals/day (30 - 35 kcals/kg )  Justification: Repletion  Estimated Protein Needs: 65 + grams protein/day (1.2 - 1.5 grams of pro/kg)  Justification: Repletion  Estimated Fluid Needs: 1 mL/kcal)   Justification: Per provider pending fluid status    PHYSICAL FINDINGS  See malnutrition section above.     Alli score :Nutrition 3 Total Score 20    GI Status/Output:  Last BM:WDL  per nursing No issues noted  I/O last 3 completed shifts:  In: 243 [P.O.:240; I.V.:3]  Out: 2025 [Urine:2025]    NUTRITION DIAGNOSIS  Malnutrition related to illness  In the setting of inflammationas evidenced by poor intake PTA, labs,s/s      INTERVENTIONS  Implementation   Continue Current Nutrition Rx    Goals  Maintain Weight  Maintain Intake > 75% of meals  Tolerate consistency w/o choking or aspiration    Monitoring/Evaluation  Progress toward goals will be monitored and evaluated per protocol.

## 2022-02-09 NOTE — PLAN OF CARE
Problem: Adult Inpatient Plan of Care  Goal: Optimal Comfort and Wellbeing  Outcome: Improving     Problem: Pain Acute  Goal: Acceptable Pain Control and Functional Ability  Outcome: Improving     C/O chronic back and L-sided facial pain. Tylenol #3 given for pain; effective. NSR on tele. VSS. Slept intermittently overnight. Abrams catheter intact and patent.

## 2022-02-09 NOTE — PROGRESS NOTES
Care Management Follow Up    Length of Stay (days): 2    Expected Discharge Date: 02/10/2022     Concerns to be Addressed:     Medical progression. ID- Cultures pending, Antibiotic plan, currently getting IV antibiotics. ECHO pending.   Patient plan of care discussed at interdisciplinary rounds: Yes    Anticipated Discharge Disposition:  Home with home care     Anticipated Discharge Services:  Therapy recommending TCU  Anticipated Discharge DME:  To be determined    Patient/family educated on Medicare website which has current facility and service quality ratings:  Yes  Education Provided on the Discharge Plan:  Yes  Patient/Family in Agreement with the Plan:  Yes    Referrals Placed by CM/SW:  Home care referrals   Private pay costs discussed: Not applicable    Additional Information:  Therapy recommending TCU at discharge. Met with patient in room again today to discuss therapy recommendations. Declines TCU at this time,wants to return home at discharge. Discussed Home care services at discharge and patient is agreeable at this time. Has not previously worked with home care and does not have an agency preference. Will work on arranging home care with availability in patients area. Referrals faxed. At baseline from home with adult grandson and is independent. Family to likely transport. Care manager to follow.       Ashwini Worley RN

## 2022-02-09 NOTE — PLAN OF CARE
"Problem: Pain Acute  Goal: Acceptable Pain Control and Functional Ability  Outcome: Improving: Pt continues to have facial pain on left side from eye down to chin. Pt describes pain as \"little shocks\". Pt has PRN Tylenol #3 which has been effective to control pain.     Pt was put on Mag and K+ protocol today. K+ was 3.1. 20 mEQ given and was rechecked at 1530. K+ was 3.4. Another 20 mEq given and will be re checked at 2100. Mag WNL. Pt had X-ray and CT today, see results. Awaiting more blood cultures. Currently on Vancomycin.   Pt has a rash all over back, abdomen and arms. Daughter called today and stated it is most likely for the contrast dye from yesterday.     "

## 2022-02-10 ENCOUNTER — APPOINTMENT (OUTPATIENT)
Dept: OCCUPATIONAL THERAPY | Facility: HOSPITAL | Age: 77
DRG: 178 | End: 2022-02-10
Payer: COMMERCIAL

## 2022-02-10 ENCOUNTER — TELEPHONE (OUTPATIENT)
Dept: FAMILY MEDICINE | Facility: CLINIC | Age: 77
End: 2022-02-10
Payer: COMMERCIAL

## 2022-02-10 ENCOUNTER — APPOINTMENT (OUTPATIENT)
Dept: SPEECH THERAPY | Facility: HOSPITAL | Age: 77
DRG: 178 | End: 2022-02-10
Payer: COMMERCIAL

## 2022-02-10 LAB
ALBUMIN SERPL-MCNC: 2.3 G/DL (ref 3.5–5)
ALP SERPL-CCNC: 96 U/L (ref 45–120)
ALT SERPL W P-5'-P-CCNC: 11 U/L (ref 0–45)
ANION GAP SERPL CALCULATED.3IONS-SCNC: 8 MMOL/L (ref 5–18)
AST SERPL W P-5'-P-CCNC: 14 U/L (ref 0–40)
BILIRUB SERPL-MCNC: 0.1 MG/DL (ref 0–1)
BUN SERPL-MCNC: 7 MG/DL (ref 8–28)
CALCIUM SERPL-MCNC: 8.1 MG/DL (ref 8.5–10.5)
CHLORIDE BLD-SCNC: 111 MMOL/L (ref 98–107)
CO2 SERPL-SCNC: 25 MMOL/L (ref 22–31)
CREAT SERPL-MCNC: 0.63 MG/DL (ref 0.6–1.1)
ERYTHROCYTE [DISTWIDTH] IN BLOOD BY AUTOMATED COUNT: 18 % (ref 10–15)
GFR SERPL CREATININE-BSD FRML MDRD: >90 ML/MIN/1.73M2
GLUCOSE BLD-MCNC: 84 MG/DL (ref 70–125)
HCT VFR BLD AUTO: 30.6 % (ref 35–47)
HGB BLD-MCNC: 8.9 G/DL (ref 11.7–15.7)
MAGNESIUM SERPL-MCNC: 1.7 MG/DL (ref 1.8–2.6)
MCH RBC QN AUTO: 23.2 PG (ref 26.5–33)
MCHC RBC AUTO-ENTMCNC: 29.1 G/DL (ref 31.5–36.5)
MCV RBC AUTO: 80 FL (ref 78–100)
PLATELET # BLD AUTO: 387 10E3/UL (ref 150–450)
POTASSIUM BLD-SCNC: 3.5 MMOL/L (ref 3.5–5)
PROCALCITONIN SERPL-MCNC: 0.03 NG/ML (ref 0–0.49)
PROT SERPL-MCNC: 5.2 G/DL (ref 6–8)
RBC # BLD AUTO: 3.83 10E6/UL (ref 3.8–5.2)
SODIUM SERPL-SCNC: 144 MMOL/L (ref 136–145)
VANCOMYCIN SERPL-MCNC: 42.9 MG/L
WBC # BLD AUTO: 4.5 10E3/UL (ref 4–11)

## 2022-02-10 PROCEDURE — 99232 SBSQ HOSP IP/OBS MODERATE 35: CPT | Performed by: PSYCHIATRY & NEUROLOGY

## 2022-02-10 PROCEDURE — 250N000011 HC RX IP 250 OP 636: Performed by: INTERNAL MEDICINE

## 2022-02-10 PROCEDURE — 250N000013 HC RX MED GY IP 250 OP 250 PS 637

## 2022-02-10 PROCEDURE — G0008 ADMIN INFLUENZA VIRUS VAC: HCPCS | Performed by: INTERNAL MEDICINE

## 2022-02-10 PROCEDURE — 97530 THERAPEUTIC ACTIVITIES: CPT | Mod: GO

## 2022-02-10 PROCEDURE — 84145 PROCALCITONIN (PCT): CPT | Performed by: INTERNAL MEDICINE

## 2022-02-10 PROCEDURE — 80053 COMPREHEN METABOLIC PANEL: CPT | Performed by: INTERNAL MEDICINE

## 2022-02-10 PROCEDURE — 99232 SBSQ HOSP IP/OBS MODERATE 35: CPT | Performed by: INTERNAL MEDICINE

## 2022-02-10 PROCEDURE — 85041 AUTOMATED RBC COUNT: CPT | Performed by: INTERNAL MEDICINE

## 2022-02-10 PROCEDURE — 250N000013 HC RX MED GY IP 250 OP 250 PS 637: Performed by: PSYCHIATRY & NEUROLOGY

## 2022-02-10 PROCEDURE — 999N000127 HC STATISTIC PERIPHERAL IV START W US GUIDANCE

## 2022-02-10 PROCEDURE — 99232 SBSQ HOSP IP/OBS MODERATE 35: CPT

## 2022-02-10 PROCEDURE — 85014 HEMATOCRIT: CPT | Performed by: INTERNAL MEDICINE

## 2022-02-10 PROCEDURE — 83735 ASSAY OF MAGNESIUM: CPT | Performed by: INTERNAL MEDICINE

## 2022-02-10 PROCEDURE — 36415 COLL VENOUS BLD VENIPUNCTURE: CPT | Performed by: INTERNAL MEDICINE

## 2022-02-10 PROCEDURE — 90662 IIV NO PRSV INCREASED AG IM: CPT | Performed by: INTERNAL MEDICINE

## 2022-02-10 PROCEDURE — 250N000013 HC RX MED GY IP 250 OP 250 PS 637: Performed by: STUDENT IN AN ORGANIZED HEALTH CARE EDUCATION/TRAINING PROGRAM

## 2022-02-10 PROCEDURE — 120N000001 HC R&B MED SURG/OB

## 2022-02-10 PROCEDURE — 999N000202 HC STATISTICAL VASC ACCESS NURSE TIME, 1-15 MINUTES

## 2022-02-10 PROCEDURE — 97535 SELF CARE MNGMENT TRAINING: CPT | Mod: GO

## 2022-02-10 PROCEDURE — 250N000013 HC RX MED GY IP 250 OP 250 PS 637: Performed by: INTERNAL MEDICINE

## 2022-02-10 PROCEDURE — 80202 ASSAY OF VANCOMYCIN: CPT | Performed by: INTERNAL MEDICINE

## 2022-02-10 PROCEDURE — 92526 ORAL FUNCTION THERAPY: CPT | Mod: GN | Performed by: SPEECH-LANGUAGE PATHOLOGIST

## 2022-02-10 PROCEDURE — 82040 ASSAY OF SERUM ALBUMIN: CPT | Performed by: INTERNAL MEDICINE

## 2022-02-10 RX ORDER — LEVETIRACETAM 250 MG/1
250 TABLET ORAL 2 TIMES DAILY
Status: DISCONTINUED | OUTPATIENT
Start: 2022-02-10 | End: 2022-02-11

## 2022-02-10 RX ORDER — CLINDAMYCIN HCL 150 MG
300 CAPSULE ORAL EVERY 6 HOURS SCHEDULED
Status: DISCONTINUED | OUTPATIENT
Start: 2022-02-10 | End: 2022-02-12 | Stop reason: HOSPADM

## 2022-02-10 RX ADMIN — NORTRIPTYLINE HYDROCHLORIDE 50 MG: 50 CAPSULE ORAL at 20:24

## 2022-02-10 RX ADMIN — ACETAMINOPHEN AND CODEINE PHOSPHATE 1 TABLET: 300; 30 TABLET ORAL at 06:30

## 2022-02-10 RX ADMIN — VANCOMYCIN HYDROCHLORIDE 1000 MG: 1 INJECTION, SOLUTION INTRAVENOUS at 06:17

## 2022-02-10 RX ADMIN — INFLUENZA A VIRUS A/VICTORIA/2570/2019 IVR-215 (H1N1) ANTIGEN (FORMALDEHYDE INACTIVATED), INFLUENZA A VIRUS A/TASMANIA/503/2020 IVR-221 (H3N2) ANTIGEN (FORMALDEHYDE INACTIVATED), INFLUENZA B VIRUS B/PHUKET/3073/2013 ANTIGEN (FORMALDEHYDE INACTIVATED), AND INFLUENZA B VIRUS B/WASHINGTON/02/2019 ANTIGEN (FORMALDEHYDE INACTIVATED) 0.7 ML: 60; 60; 60; 60 INJECTION, SUSPENSION INTRAMUSCULAR at 11:17

## 2022-02-10 RX ADMIN — Medication 1000 MCG: at 08:21

## 2022-02-10 RX ADMIN — ACETAMINOPHEN AND CODEINE PHOSPHATE 1 TABLET: 300; 30 TABLET ORAL at 22:16

## 2022-02-10 RX ADMIN — Medication 1 MG: at 00:19

## 2022-02-10 RX ADMIN — NORTRIPTYLINE HYDROCHLORIDE 50 MG: 50 CAPSULE ORAL at 08:21

## 2022-02-10 RX ADMIN — ENOXAPARIN SODIUM 40 MG: 40 INJECTION SUBCUTANEOUS at 18:11

## 2022-02-10 RX ADMIN — OXCARBAZEPINE 450 MG: 300 TABLET, FILM COATED ORAL at 20:24

## 2022-02-10 RX ADMIN — PANTOPRAZOLE SODIUM 40 MG: 20 TABLET, DELAYED RELEASE ORAL at 06:30

## 2022-02-10 RX ADMIN — LEVETIRACETAM 250 MG: 250 TABLET, FILM COATED ORAL at 20:24

## 2022-02-10 RX ADMIN — CLINDAMYCIN HYDROCHLORIDE 300 MG: 150 CAPSULE ORAL at 18:11

## 2022-02-10 RX ADMIN — LEVETIRACETAM 250 MG: 250 TABLET, FILM COATED ORAL at 15:39

## 2022-02-10 RX ADMIN — FERROUS SULFATE TAB 325 MG (65 MG ELEMENTAL FE) 325 MG: 325 (65 FE) TAB at 08:21

## 2022-02-10 RX ADMIN — DIPHENHYDRAMINE HCL 25 MG: 25 TABLET ORAL at 20:52

## 2022-02-10 RX ADMIN — ASPIRIN 81 MG: 81 TABLET, CHEWABLE ORAL at 08:21

## 2022-02-10 RX ADMIN — TOPIRAMATE 50 MG: 25 TABLET, FILM COATED ORAL at 20:24

## 2022-02-10 RX ADMIN — ACETAMINOPHEN AND CODEINE PHOSPHATE 1 TABLET: 300; 30 TABLET ORAL at 13:13

## 2022-02-10 ASSESSMENT — ACTIVITIES OF DAILY LIVING (ADL)
ADLS_ACUITY_SCORE: 15
ADLS_ACUITY_SCORE: 13
ADLS_ACUITY_SCORE: 15

## 2022-02-10 ASSESSMENT — MIFFLIN-ST. JEOR: SCORE: 1042.45

## 2022-02-10 NOTE — PROGRESS NOTES
Infectious Disease Progress Note    Assessment/Plan  Impression: Weakness and balance problems, likely secondary to new stroke.     RUL pnuemonia on CXR and CT     Patient also found to aspirate, so aspiration pneumonia is a possibility.     Single positive blood culture with Gemella and S mitis.  Likely contaminant, but those organisms can be associated with pneumonia.      Recommendations: change antibiotics to clindamycin orally for 10 days.   Active Problems:    Left-sided weakness    Pneumonia of right upper lobe due to infectious organism      WOO DAVIS MD  [phone]      Subjective  Feel better. Eating some.     Objective    Vital signs in last 24 hours  Temp:  [97.6  F (36.4  C)-98  F (36.7  C)] 97.6  F (36.4  C)  Pulse:  [65-75] 65  Resp:  [14-18] 18  BP: (104-112)/(48-57) 112/54  SpO2:  [94 %-98 %] 97 %  Wt Readings from Last 3 Encounters:   02/09/22 52.6 kg (115 lb 14.4 oz)   05/04/21 51.4 kg (113 lb 4.8 oz)   08/21/20 49.9 kg (110 lb)       Intake/Output last 3 shifts  I/O last 3 completed shifts:  In: 480 [P.O.:480]  Out: 1300 [Urine:1300]  Intake/Output this shift:  I/O this shift:  In: 240 [P.O.:240]  Out: -     Review of Systems   Pertinent items are noted in HPI., otherwise negative.    Physical Exam  General appearance: alert, appears stated age, cooperative and thin  Head: Normocephalic, without obvious abnormality, atraumatic  Eyes: EOMI, no icterus  Neck: no adenopathy and supple, symmetrical, trachea midline  Lungs: clear to auscultation bilaterally  Heart: RRR, no murmur  Abdomen: soft, non-tender; bowel sounds normal; no masses,  no organomegaly  Extremities: warm and dry  Skin: erythroderma is unchanged  Neurologic: Grossly normal  Psychiatric: Normal    Pertinent Labs   Lab Results: personally reviewed.     Recent Labs   Lab 02/10/22  0752 02/08/22  0743 02/07/22  0011   WBC 4.5 9.2 16.3*   HGB 8.9* 9.1* 10.3*   HCT 30.6* 30.7* 35.6    391 382        Recent Labs   Lab  02/10/22  0752 22  0743 22  0011    143 139   CO2 25 22 25   BUN 7* 8 12     No results found for: CULT    0136 02/10/2022 1009 Blood Culture Peripheral Blood [[id number]]    (Abnormal)   Peripheral Blood    Preliminary result Component Value   Culture Positive on the 1st day of incubation Abnormal  P    Gemella species Panic  P    1 of 2 bottles   Referred to research section for identification.    Streptococcus mitis group Panic  P    1 of 2 bottles                 Collected Updated Procedure Result Status    2022 0139 2022 0310 Symptomatic; Unknown Influenza A/B & SARS-CoV2 (COVID-19) Virus PCR Multiplex Nose [[id number]]    Swab from Nose    Final result Component Value   Influenza A PCR Negative   Influenza B PCR Negative   SARS CoV2 PCR Negative   NEGATIVE: SARS-CoV-2 (COVID-19) RNA not detected, presumed negative.                       2022 0136 2022 0931 Blood Culture Peripheral Blood [[id number]]   Peripheral Blood    Preliminary result Component Value   Culture No growth after 2 days P           2022 0136 2022 0129 Verigene GP Panel [[id number]]    Peripheral Blood    Final result Component Value   Staphylococcus species Not Detected   Staphylococcus aureus Not Detected   Staphylococcus epidermidis Not Detected   Staphylococcus lugdunensis Not Detected   Enterococcus faecalis Not Detected   Enterococcus faecium Not Detected   Streptococcus species Not Detected   Streptococcus agalactiae Not Detected   Streptococcus anginosus group Not Detected   Streptococcus pneumoniae Not Detected   Streptococcus pyogenes Not Detected   Listeria species Not Detected            Pertinent Radiology   Echocardiogram Complete    Result Date: 2022  [id number]^[address]IA^[place] [address]  Name: CHELITA SAWANT MRN: [id number] : 1945 Study Date: 2022 11:22 AM Age: 76 yrs Gender:  Female Patient Location: [facility] Reason For Study: Arterial Embolism and Thrombosis Ordering Physician: AFRICA KELLY Performed By: DANNY  BSA: 1.6 m2 Height: 66 in Weight: 115 lb HR: 77 ______________________________________________________________________________ Procedure Complete Portable Echo Adult. Complete Portable Bubble Echo Adult. ______________________________________________________________________________ Interpretation Summary  1. Normal left ventricular size and systolic performance with a visually estimated ejection fraction of 70-75%. 2. There is mild to moderate aortic insufficiency. 3. There is mild, to perhaps mild-moderate, tricuspid insufficiency. 4. Normal right ventricular size and systolic performance. 5. There is mild left atrial enlargement. 6. Right ventricular systolic pressure relative to right atrial pressure is mildly increased. The pulmonary artery pressure is estimated to be 40 mmHg plus right atrial pressure (the IVC is of normal caliber). 7. Echo contrast examination was performed using agitated NS as contrast agent. The right heart opacity was good and the left heart was well visualized. There was no evidence of right to left shunting during spontaneous respiration or following release of Valsalva.  When compared to the prior real-time echocardiogram dated 10 August 2020, there has been a slight increase in the degree of aortic insufficiency. There has been a mild decrease in the pulmonary artery pressure estimate. Otherwise, the findings are felt to be fairly similar on both examinations. ______________________________________________________________________________ Left ventricle: Normal left ventricular size and systolic performance with a visually estimated ejection fraction of 70-75%. There is normal regional wall motion. Left ventricular wall thickness is normal.  Assessment of LV Diastolic Function: The cumulative findings suggest impaired diastolic filling [The septal e'  velocity is > 7 cm/s & lateral e' velocity is < 10 cm/s. The average E/e' is < 14. TR velocity is > 2.8 m/s. Left atrial volume index is greater than 34 mL/mÂ ].  Assessment of left atrial pressure (LAP): The cumulative findings suggest moderately elevated left atrial pressure (the E/A is > 0.8 and <2.0 plus 2 or 3 of 3 of the following present: Average E/e' > 14, TRvel > 2.8 m/s, and/or LA vol. index > 34 ml/mÂ  ).  Right ventricle: Normal right ventricular size and systolic performance.  Left atrium: There is mild left atrial enlargement.  Right atrium: The right atrium is of normal size.  IVC: The IVC is of normal caliber.  Aortic valve: The aortic valve is comprised of three cusps. There is no significant aortic stenosis. There is mild to moderate aortic insufficiency.  Mitral valve: The mitral valve appears morphologically normal. There is trace mitral insufficiency.  Tricuspid valve: The tricuspid valve is grossly morphologically normal. There is mild, to perhaps mild-moderate, tricuspid insufficiency.  Pulmonic valve: The pulmonic valve is grossly morphologically normal.  Thoracic aorta: The aortic root and proximal ascending aorta are of normal dimension.  Pericardium: There is no significant pericardial effusion. ______________________________________________________________________________ ______________________________________________________________________________ MMode/2D Measurements & Calculations IVSd: 1.2 cm LVIDd: 3.8 cm LVIDs: 2.4 cm LVPWd: 0.77 cm FS: 36.5 % LV mass(C)d: 115.8 grams LV mass(C)dI: 73.3 grams/m2 Ao root diam: 2.9 cm LA dimension: 3.1 cm asc Aorta Diam: 2.8 cm LA/Ao: 1.1 LVOT diam: 1.8 cm LVOT area: 2.6 cm2  LA Volume Indexed (AL/bp): 42.0 ml/m2 RWT: 0.41  Doppler Measurements & Calculations MV E max mikal: 118.0 cm/sec MV A max mikal: 136.5 cm/sec MV E/A: 0.86 MV dec slope: 516.2 cm/sec2 MV dec time: 0.23 sec Ao V2 max: 188.9 cm/sec Ao max P.0 mmHg Ao V2 mean: 119.7 cm/sec Ao  mean P.9 mmHg Ao V2 VTI: 39.2 cm MARIE(I,D): 1.9 cm2 MARIE(V,D): 2.0 cm2 AI P1/2t: 671.3 msec LV V1 max P.8 mmHg LV V1 max: 148.1 cm/sec LV V1 VTI: 28.7 cm SV(LVOT): 74.6 ml SI(LVOT): 47.2 ml/m2 PA V2 max: 0.48 cm/sec PA max P.00 mmHg PA acc time: 0.10 sec TR max dave: 315.8 cm/sec TR max P.9 mmHg AV Dave Ratio (DI): 0.78 MARIE Index (cm2/m2): 1.2 E/E' av.5 Lateral E/e': 13.9  Medial E/e': 11.2  ______________________________________________________________________________ Report approved by: Denny Howe 2022 12:57 PM       XR Chest Port 1 View    Result Date: 2022  EXAM: XR CHEST PORT 1 VIEW LOCATION: [facility] DATE/TIME: 2022 10:07 AM INDICATION: follow up possible infiltrates COMPARISON: 2022.     IMPRESSION: There is increased airspace opacity in the right mid to upper lung consistent with right-sided pneumonia. Left lung is clear. No pneumothorax or pleural effusion. The heart size and pulmonary vascularity are normal. NOTE: ABNORMAL REPORT THE DICTATION ABOVE DESCRIBES AN ABNORMALITY FOR WHICH FOLLOW-UP IS NEEDED.     XR Chest Port 1 View    Result Date: 2022  EXAM: XR CHEST PORT 1 VIEW LOCATION: [facility] DATE/TIME: 2022 12:51 AM INDICATION: Fever. COMPARISON: 2020.     IMPRESSION: Question some mild hazy right perihilar infiltrate with a mild or low-grade pneumonitis not excluded. Left lung is clear. Normal heart size and pulmonary vascularity. Osteopenia. Remainder unremarkable.    CTA Head Neck with Contrast    Result Date: 2022  EXAM: CTA  HEAD NECK WITH CONTRAST LOCATION: [facility] DATE/TIME: 2022 12:02 AM INDICATION: Code Stroke to evaluate for potential thrombolysis and thrombectomy.  PLEASE READ IMMEDIATELY. Left-sided weakness and slurred speech. COMPARISON: Head CT 2019. CONTRAST: 75 ml Isovue 370. TECHNIQUE: Head and neck CT angiogram with IV contrast.  Noncontrast head CT followed by axial helical CT images of the head and neck vessels obtained during the arterial phase of intravenous contrast administration. Axial 2D reconstructed images and multiplanar 3D MIP reconstructed images of the head and neck vessels were performed by the technologist. Dose reduction techniques were used. All stenosis measurements made according to NASCET criteria unless otherwise specified. FINDINGS: NONCONTRAST HEAD CT: INTRACRANIAL CONTENTS: No intracranial hemorrhage, extraaxial collection, or mass effect. No CT evidence of acute infarct. Mild presumed chronic small vessel ischemic changes. Mild generalized volume loss. No hydrocephalus. There are small chronic infarcts in the bilateral cerebellar hemispheres. VISUALIZED ORBITS/SINUSES/MASTOIDS: No intraorbital abnormality. No paranasal sinus mucosal disease. No middle ear or mastoid effusion. BONES/SOFT TISSUES: No acute abnormality. HEAD CTA: ANTERIOR CIRCULATION: No major vessel intraluminal filling defect, flow-limiting stenosis or occlusion. Mild to moderate atheromatous changes in the carotid siphons. Within the limits of CTA, no convincing aneurysm or high flow vascular lesion. Standard [place] anatomy. POSTERIOR CIRCULATION: No major vessel intraluminal filling defect, flow-limiting stenosis or occlusion. There are mild scattered atheromatous changes. Dominant right and smaller left vertebral artery contribute to a normal basilar artery. DURAL VENOUS SINUSES: Expected enhancement of the major dural venous sinuses. NECK CTA: RIGHT CAROTID: No measurable stenosis or dissection. LEFT CAROTID: No measurable stenosis or dissection. VERTEBRAL ARTERIES: No focal stenosis or dissection. Dominant right and smaller left vertebral arteries. AORTIC ARCH: Vascular variant aortic arch origin left vertebral artery.  No significant stenosis at the origin of the great vessels. NONVASCULAR STRUCTURES: Right upper lobe airspace  infiltrate. Underlying mild centrilobular emphysema.     IMPRESSION: HEAD CT: 1.  No CT evidence of acute intracranial hemorrhage, mass or recent infarct. 2.  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 3.  Mild volume loss and presumed chronic small vessel ischemic changes. HEAD CTA: 1.  No major vessel acute thromboembolism, flow-limiting stenosis or occlusion. 2.  Evidence of mild intracranial atherosclerosis. NECK CTA: 1.  Normal neck CTA. 2.  Evidence of a right upper lobe infiltrate. Correlate for pneumonia. 3.  Underlying mild centrilobular emphysema. Results were called to Dr. Park at 12:39 AM on 02/07/2022.    CT Head w/o Contrast    Result Date: 2/8/2022  EXAM: CT HEAD W/O CONTRAST LOCATION: [facility] DATE/TIME: 2/8/2022 1:22 PM INDICATION: Left-sided weakness. COMPARISON: Head and neck CTA 02/07/2022. TECHNIQUE: Routine CT Head without IV contrast. Multiplanar reformats. Dose reduction techniques were used. FINDINGS: INTRACRANIAL CONTENTS: There is no evidence of acute large territorial infarction, acute intracranial hemorrhage, or mass lesion. Chronic infarcts in the bilateral cerebellar hemispheres appear unchanged from the prior study. Mild scattered nonspecific hypodensity in the cerebral white matter appears stable from the prior study. Mild prominence of the ventricles, sulci, and cisterns consistent with mild diffuse brain parenchymal volume loss appears stable from the prior study. There is no evidence of hydrocephalus. VISUALIZED ORBITS/SINUSES/MASTOIDS: No intraorbital abnormality. There is a tiny mucous retention cyst in the right sphenoid sinus. The remainder of the visualized paranasal sinuses are clear. There is trace opacification of the inferior left mastoid air  cells. The right mastoid air cells are clear. BONES/SOFT TISSUES: No acute abnormality.     IMPRESSION: 1.  No evidence of acute large territorial infarction, acute intracranial hemorrhage, or  mass lesion. 2.  Stable chronic bilateral cerebellar infarcts. 3.  Stable mild chronic small vessel ischemic change and mild diffuse brain parenchymal volume loss.    XR Video Swallow with SLP or OT    Result Date: 2/7/2022  EXAM: XR VIDEO SWALLOW WITH SLP OR OT LOCATION: [facility] DATE/TIME: 2/7/2022 11:35 AM INDICATION: Difficulty swallowing. COMPARISON: None. TECHNIQUE: Routine swallow study with speech pathology using multiple barium thicknesses. FINDINGS: FLUOROSCOPIC TIME: 1 minutes. NUMBER OF IMAGES: 0 Swallow study with Speech Pathology using multiple barium thicknesses. Trace amount of aspiration is seen with thin barium consistency. No other evidence of laryngeal penetration or aspiration is seen with other barium consistencies. Please see dedicated Speech Pathology report for further details of examination.     EEG Awake or Drowsy Routine    Result Date: 2/8/2022  EEG report DATE Feb 8, 2022 CLINICAL HISTORY This is a 76 year old female with asterixis. The EEG is done to look for underlying epilepsy. INTRODUCTION This is a routine EEG performed utilizing standard 10- 20 system of electrode placement with 20 channels of recording including one channel of EKG monitor. The patient was studied in awake and drowsy state. Photic stimulation was done. EEG TIME: 29 min DESCRIPTION OF THE RECORD The EEG background consists of a 9 to 10 Hz posterior dominant rhythm that is symmetric. No convincing epileptiform abnormalities were noted. Normal responses noted to photic stimulation. Mild generalized background dysrhythmia is present. IMPRESSION/REPORT/PLAN This is a normal EEG during wakefulness and drowsiness except for mild generalized background dysrhythmia. EEG is not suggestive of ongoing seizures or encephalopathy. Further clinical correlation is needed. Please note that the absence of epileptiform abnormalities does not exclude the possibility of epilepsy in any patient.

## 2022-02-10 NOTE — PROGRESS NOTES
"[facility]    PROGRESS NOTE - Hospitalist Service    Assessment and Plan    76 year old female with past medical history of hyperlipidemia, migraine who presents with fever, generalized weakness, she was found to to have pneumonia.      Generalized weakness/tremors and asterixis  - Generalized weakness intermittent episodes of slurred speech, reports left side more weak than the right.  -Initially stroke code was called at ED, then the deescalated  -CT and CT angiogram no acute changes or major vessel occlusions, also with no objective focal deficits on exam  - Patient declined MRI, she wants to be \" totally out\" just like when she had her colonoscopy, declined to try even with Ativan.   -Repeated CT head is negative for significant acute changes   - Neurology consult, appreciate input  -EEG is negative for epilepsy  - Echo shows EF of 70 to 75% with worsening aortic insufficiency     Right upper lobe aspiration pneumonia  -Present on chest x-ray and CT chest  - Patient also has aspiration on speech evaluation  -WBC 16.3, chest x-ray with right perihilar infiltrate- no productive cough, no fever; procalcitonin wnl  -Started initially on ceftriaxone and azithromycin, incentive spirometry started on admission -> developed rash allover; hold rocephin (h/o PCN allergy) and continue Zithromax    Gram-positive bacteremia  - blood cx positive for GPC in cluster; bacteriemia or contamination-> oncall doc started on Vanco-  - repeat chest x-ray shows increased opacity in the right mid/upper lung consistent with right-sided pneumonia  - ID consultation, appreciate input  -Continue vancomycin pending culture results     Mild aspiration  - Speech therapy fo swallow eval and order diet accordingly     Hypokalemia and hypomagnesemia  -replace as per protocol   -Continue to monitor level     Acute skin rash  -Patient claims that she is allergic to iv contrast for ct vs to Rocephin (h/o pcn allergy with " rash)  - Improving,   - continue Benadryl prn     Iron deficiency anemia  -Resume oral iron, monitor Hgb serially     Weakness and deconditioning  - PT/OT evaluation  - Patient would need TCU on discharge       Active Problems:    Left-sided weakness    Pneumonia of right upper lobe due to infectious organism      VTE prophylaxis:  Enoxaparin (Lovenox) SQ  DIET: Orders Placed This Encounter      Combination Diet Easy to Chew (level 7); Thin Liquids (level 0); No Straws      Disposition/Barriers to discharge: IV antibiotics  Code Status: Full Code    Subjective:  Julisa is feeling better today, denies any chest pain or shortness of breath. No fever chills overnight.    PHYSICAL EXAM  Vitals:    02/06/22 2339 02/07/22 2049 02/09/22 1928   Weight: 51.3 kg (113 lb) 52.2 kg (115 lb) 52.6 kg (115 lb 14.4 oz)     B/P:112/54 T:97.6 P:65 R:18     Intake/Output Summary (Last 24 hours) at 2/10/2022 1456  Last data filed at 2/10/2022 1314  Gross per 24 hour   Intake 240 ml   Output 800 ml   Net -560 ml      Body mass index is 18.71 kg/m .    Constitutional: awake, alert, cooperative, no apparent distress, and appears stated age  Eyes: Lids and lashes normal, pupils equal, round and reactive to light, extra ocular muscles intact, sclera clear, conjunctiva normal  ENT: Normocephalic, without obvious abnormality, atraumatic, sinuses nontender on palpation, external ears without lesions, oral pharynx with moist mucous membranes, tonsils without erythema or exudates, gums normal and good dentition.  Respiratory: No increased work of breathing, good air exchange, clear to auscultation bilaterally, no crackles or wheezing  Cardiovascular: Normal apical impulse, regular rate and rhythm, normal S1 and S2, no S3 or S4, and no murmur noted  GI: No scars, normal bowel sounds, soft, non-distended, non-tender, no masses palpated, no hepatosplenomegally  Skin: no bruising or bleeding and normal skin color, texture, turgor  Musculoskeletal:  There is no redness, warmth, or swelling of the joints.  Full range of motion noted.  no lower extremity pitting edema present  Neurologic: Awake, alert, oriented to name, place and time.  Cranial nerves II-XII are grossly intact.  Motor is 5 out of 5 bilaterally.   Sensory is intact.    Neuropsychiatric: Appropriate with examiner      PERTINENT LABS/IMAGING:  Recent Labs   Lab 02/10/22  0752 02/09/22  0747 02/08/22  2104 02/08/22  1532 02/08/22  0743 02/07/22  0019 02/07/22  0012 02/07/22  0011   WBC 4.5  --   --   --  9.2  --   --  16.3*   HGB 8.9*  --   --   --  9.1*  --   --  10.3*   MCV 80  --   --   --  79  --   --  81     --   --   --  391  --   --  382   INR  --   --   --   --   --   --  1.05  --      --   --   --  143  --   --  139   POTASSIUM 3.5 3.7 3.6   < > 3.1*  --   --  3.7   CHLORIDE 111*  --   --   --  112*  --   --  106   CO2 25  --   --   --  22  --   --  25   BUN 7*  --   --   --  8  --   --  12   CR 0.63  --   --   --  0.58* 0.6  --  0.73   ANIONGAP 8  --   --   --  9  --   --  8   ADY 8.1*  --   --   --  7.8*  --   --  8.7   GLC 84  --   --   --  94  --   --  138*   ALBUMIN 2.3*  --   --   --   --   --   --  3.3*   PROTTOTAL 5.2*  --   --   --   --   --   --  6.8   BILITOTAL 0.1  --   --   --   --   --   --  0.2   ALKPHOS 96  --   --   --   --   --   --  138*   ALT 11  --   --   --   --   --   --  17   AST 14  --   --   --   --   --   --  14    < > = values in this interval not displayed.     No results found for this or any previous visit (from the past 24 hour(s)).    Discussed with patient, family, ID, nursing staff and discharge planner    Michael Hernandez MD  [facility] Medicine Service  [phone]

## 2022-02-10 NOTE — PHARMACY-VANCOMYCIN DOSING SERVICE
Pharmacy Vancomycin Note  Date of Service February 10, 2022  Patient's  1945   76 year old, female    Indication: Bacteremia  Day of Therapy: 4  Current vancomycin regimen:  1000 mg IV q18h  Current vancomycin monitoring method: AUC  Current vancomycin therapeutic monitoring goal: 400-600 mg*h/L    [employer] Prediction of Current Vancomycin Regimen  Regimen: 1000 mg IV every 18 hours.  Start time: 00:17 on 2022  Exposure target: AUC24 (range)400-600 mg/L.hr   AUC24,ss: 557 mg/L.hr  Probability of AUC24 > 400: 91 %  Ctrough,ss: 15.2 mg/L  Probability of Ctrough,ss > 20: 22 %  Probability of nephrotoxicity (Lodise ESTIVEN ): 10 %    Current estimated CrCl = Estimated Creatinine Clearance: 63.1 mL/min (based on SCr of 0.63 mg/dL).    Creatinine for last 3 days  2022:  7:43 AM Creatinine 0.58 mg/dL  2/10/2022:  7:52 AM Creatinine 0.63 mg/dL    Recent Vancomycin Levels (past 3 days)  2/10/2022:  7:52 AM Vancomycin 42.9 mg/L    Vancomycin IV Administrations (past 72 hours)                   vancomycin (VANCOCIN) 1000 mg in dextrose 5% 200 mL PREMIX (mg) 1,000 mg New Bag 02/10/22 0617     1,000 mg New Bag 22 1327     1,000 mg New Bag 22 1850     1,000 mg New Bag  0040                Nephrotoxins and other renal medications (From now, onward)    Start     Dose/Rate Route Frequency Ordered Stop    22 0100  vancomycin (VANCOCIN) 1000 mg in dextrose 5% 200 mL PREMIX         20 mg/kg × 52.2 kg  200 mL/hr over 1 Hours Intravenous EVERY 18 HOURS 22 2343               Contrast Orders - past 72 hours (72h ago, onward)            Start     Dose/Rate Route Frequency Ordered Stop    22 1300  barium sulfate (VARIBAR THIN Liquid) 40 % oral suspension 24 g         60 mL Oral ONCE 22 1230 22 1232    22 1300  barium sulfate 40% (VARIBAR NECTAR) oral suspension          Oral ONCE 22 1230 22 1232    22 1300  barium sulfate (VARIBAR) 40 % pudding/paste 20 mL          20 mL Oral ONCE 02/07/22 1230 02/07/22 1232    02/07/22 1300  barium sulfate 40% (VARIBAR THIN HONEY) oral suspension          Oral ONCE 02/07/22 1230 02/07/22 1232          Interpretation of levels and current regimen:  Vancomycin level is reflective of -600, level drawn was ~30 after infusion was complete, however AUC remains 400-600  Has serum creatinine changed greater than 50% in last 72 hours: No  Urine output:  good urine output  Renal Function: Stable    Plan:  1. Continue Current Dose  2. Vancomycin monitoring method: AUC  3. Vancomycin therapeutic monitoring goal: 400-600 mg*h/L  4. Pharmacy will recheck vancomycin level tomorrow 2/11 @1800  5. Serum creatinine levels will be ordered daily for the first week of therapy and at least twice weekly for subsequent weeks.    Park Schrader, [facility]     [id number]

## 2022-02-10 NOTE — PROGRESS NOTES
Care Management Follow Up    Length of Stay (days): 3    Expected Discharge Date: 02/10/2022     Concerns to be Addressed:     Medical Progression. ID following-cultures pending, IV antibiotics   Patient plan of care discussed at interdisciplinary rounds: Yes    Anticipated Discharge Disposition:  Home with home care services     Anticipated Discharge Services:  Therapy recommending TCU  Anticipated Discharge DME:  To be determined     Patient/family educated on Medicare website which has current facility and service quality ratings:  Yes  Education Provided on the Discharge Plan:  Yes  Patient/Family in Agreement with the Plan:  Yes    Referrals Placed by CM/SW:  Home care referrasl   Private pay costs discussed: Not applicable    Additional Information:  Chart reviewed. Awaiting final antibiotic plan. Therapy recommending TCU at discharge; discussed with patient who declines. Would be open to home care services coming out. Home care referrals faxed; patient did not have preference. Received message from intake from [facility] inquiring if patient needing IV antibiotics at discharge; if so would not be able to accept, return number [phone] provided. Called and left message updating that final antibiotic plan still pending; will update once final plan is known. At baseline from home with adult grandson and is independent. Family to likely transport. Care manager to follow.   2:49 PM Antibiotics changed to oral.       Ashwini Worley RN

## 2022-02-10 NOTE — PLAN OF CARE
Speech Language Therapy Discharge Summary    Reason for therapy discharge:    All goals and outcomes met, no further needs identified.    Progress towards therapy goal(s). See goals on Care Plan in [facility] electronic health record for goal details.  Goals met    Therapy recommendation(s):    No further therapy is recommended. Patient is discharged from speech therapy on diet of regular textures/easy to chew d/t missing dentition, and thin liquids with use of chin tuck to decrease instances of silent aspiration as noted on video swallow study on 2/7/22 d/t delayed swallow reflex.

## 2022-02-10 NOTE — TELEPHONE ENCOUNTER
Margie from [facility] wanting to verify that Dr. Murillo will be following pt through home care.     Please call with verbal. OK to leave a detailed message.

## 2022-02-10 NOTE — PLAN OF CARE
Problem: Adult Inpatient Plan of Care  Goal: Optimal Comfort and Wellbeing  Outcome: Improving     Problem: Risk for Delirium  Goal: Improved Sleep  Outcome: Improving     Problem: Pain Acute  Goal: Acceptable Pain Control and Functional Ability  Outcome: Improving   Patient alert and oriented, VSS, Tylenol #3 given one time for migraine.   Skin rash intact denies discomfort.

## 2022-02-11 ENCOUNTER — APPOINTMENT (OUTPATIENT)
Dept: OCCUPATIONAL THERAPY | Facility: HOSPITAL | Age: 77
DRG: 178 | End: 2022-02-11
Payer: COMMERCIAL

## 2022-02-11 ENCOUNTER — APPOINTMENT (OUTPATIENT)
Dept: PHYSICAL THERAPY | Facility: HOSPITAL | Age: 77
DRG: 178 | End: 2022-02-11
Payer: COMMERCIAL

## 2022-02-11 LAB
HOLD SPECIMEN: NORMAL
MAGNESIUM SERPL-MCNC: 1.7 MG/DL (ref 1.8–2.6)
POTASSIUM BLD-SCNC: 3.5 MMOL/L (ref 3.5–5)

## 2022-02-11 PROCEDURE — 250N000013 HC RX MED GY IP 250 OP 250 PS 637

## 2022-02-11 PROCEDURE — 97116 GAIT TRAINING THERAPY: CPT | Mod: GP

## 2022-02-11 PROCEDURE — 250N000013 HC RX MED GY IP 250 OP 250 PS 637: Performed by: PSYCHIATRY & NEUROLOGY

## 2022-02-11 PROCEDURE — 84132 ASSAY OF SERUM POTASSIUM: CPT | Performed by: INTERNAL MEDICINE

## 2022-02-11 PROCEDURE — 83735 ASSAY OF MAGNESIUM: CPT | Performed by: INTERNAL MEDICINE

## 2022-02-11 PROCEDURE — 36415 COLL VENOUS BLD VENIPUNCTURE: CPT | Performed by: INTERNAL MEDICINE

## 2022-02-11 PROCEDURE — 250N000013 HC RX MED GY IP 250 OP 250 PS 637: Performed by: INTERNAL MEDICINE

## 2022-02-11 PROCEDURE — 250N000013 HC RX MED GY IP 250 OP 250 PS 637: Performed by: STUDENT IN AN ORGANIZED HEALTH CARE EDUCATION/TRAINING PROGRAM

## 2022-02-11 PROCEDURE — 120N000001 HC R&B MED SURG/OB

## 2022-02-11 PROCEDURE — 99232 SBSQ HOSP IP/OBS MODERATE 35: CPT | Performed by: PSYCHIATRY & NEUROLOGY

## 2022-02-11 PROCEDURE — 99232 SBSQ HOSP IP/OBS MODERATE 35: CPT | Performed by: INTERNAL MEDICINE

## 2022-02-11 PROCEDURE — 97535 SELF CARE MNGMENT TRAINING: CPT | Mod: GO

## 2022-02-11 PROCEDURE — 250N000011 HC RX IP 250 OP 636: Performed by: INTERNAL MEDICINE

## 2022-02-11 PROCEDURE — 99232 SBSQ HOSP IP/OBS MODERATE 35: CPT

## 2022-02-11 PROCEDURE — 97110 THERAPEUTIC EXERCISES: CPT | Mod: GP

## 2022-02-11 RX ORDER — POLYETHYLENE GLYCOL 3350 17 G/17G
17 POWDER, FOR SOLUTION ORAL DAILY
Status: DISCONTINUED | OUTPATIENT
Start: 2022-02-11 | End: 2022-02-12 | Stop reason: HOSPADM

## 2022-02-11 RX ORDER — LEVETIRACETAM 500 MG/1
500 TABLET ORAL 2 TIMES DAILY
Status: DISCONTINUED | OUTPATIENT
Start: 2022-02-11 | End: 2022-02-12 | Stop reason: HOSPADM

## 2022-02-11 RX ORDER — AMOXICILLIN 250 MG
1 CAPSULE ORAL AT BEDTIME
Status: DISCONTINUED | OUTPATIENT
Start: 2022-02-11 | End: 2022-02-12 | Stop reason: HOSPADM

## 2022-02-11 RX ADMIN — NORTRIPTYLINE HYDROCHLORIDE 50 MG: 50 CAPSULE ORAL at 08:37

## 2022-02-11 RX ADMIN — DOCUSATE SODIUM 50 MG AND SENNOSIDES 8.6 MG 1 TABLET: 8.6; 5 TABLET, FILM COATED ORAL at 20:51

## 2022-02-11 RX ADMIN — ASPIRIN 81 MG: 81 TABLET, CHEWABLE ORAL at 08:37

## 2022-02-11 RX ADMIN — Medication 1 MG: at 23:17

## 2022-02-11 RX ADMIN — Medication 1000 MCG: at 08:37

## 2022-02-11 RX ADMIN — FERROUS SULFATE TAB 325 MG (65 MG ELEMENTAL FE) 325 MG: 325 (65 FE) TAB at 08:37

## 2022-02-11 RX ADMIN — CLINDAMYCIN HYDROCHLORIDE 300 MG: 150 CAPSULE ORAL at 00:26

## 2022-02-11 RX ADMIN — CLINDAMYCIN HYDROCHLORIDE 300 MG: 150 CAPSULE ORAL at 06:35

## 2022-02-11 RX ADMIN — DIPHENHYDRAMINE HCL 25 MG: 25 TABLET ORAL at 16:06

## 2022-02-11 RX ADMIN — LEVETIRACETAM 250 MG: 250 TABLET, FILM COATED ORAL at 08:37

## 2022-02-11 RX ADMIN — PANTOPRAZOLE SODIUM 40 MG: 20 TABLET, DELAYED RELEASE ORAL at 06:35

## 2022-02-11 RX ADMIN — OXCARBAZEPINE 450 MG: 300 TABLET, FILM COATED ORAL at 20:52

## 2022-02-11 RX ADMIN — TOPIRAMATE 50 MG: 25 TABLET, FILM COATED ORAL at 20:51

## 2022-02-11 RX ADMIN — NORTRIPTYLINE HYDROCHLORIDE 50 MG: 50 CAPSULE ORAL at 20:51

## 2022-02-11 RX ADMIN — ACETAMINOPHEN AND CODEINE PHOSPHATE 1 TABLET: 300; 30 TABLET ORAL at 20:50

## 2022-02-11 RX ADMIN — Medication 1 MG: at 00:35

## 2022-02-11 RX ADMIN — CLINDAMYCIN HYDROCHLORIDE 300 MG: 150 CAPSULE ORAL at 11:59

## 2022-02-11 RX ADMIN — LEVETIRACETAM 500 MG: 500 TABLET, FILM COATED ORAL at 20:51

## 2022-02-11 RX ADMIN — ENOXAPARIN SODIUM 40 MG: 40 INJECTION SUBCUTANEOUS at 18:23

## 2022-02-11 RX ADMIN — POLYETHYLENE GLYCOL 3350 17 G: 17 POWDER, FOR SOLUTION ORAL at 08:37

## 2022-02-11 RX ADMIN — CLINDAMYCIN HYDROCHLORIDE 300 MG: 150 CAPSULE ORAL at 18:23

## 2022-02-11 RX ADMIN — ACETAMINOPHEN AND CODEINE PHOSPHATE 1 TABLET: 300; 30 TABLET ORAL at 08:37

## 2022-02-11 ASSESSMENT — ACTIVITIES OF DAILY LIVING (ADL)
ADLS_ACUITY_SCORE: 15
ADLS_ACUITY_SCORE: 16
ADLS_ACUITY_SCORE: 16
ADLS_ACUITY_SCORE: 13
ADLS_ACUITY_SCORE: 16
ADLS_ACUITY_SCORE: 13
ADLS_ACUITY_SCORE: 16
ADLS_ACUITY_SCORE: 13
ADLS_ACUITY_SCORE: 15
ADLS_ACUITY_SCORE: 16
ADLS_ACUITY_SCORE: 16

## 2022-02-11 NOTE — PLAN OF CARE
Problem: Adult Inpatient Plan of Care  Goal: Absence of Hospital-Acquired Illness or Injury  Intervention: Identify and Manage Fall Risk  Recent Flowsheet Documentation  Taken 2/11/2022 1130 by Nolan Cordon RN  Safety Promotion/Fall Prevention:   activity supervised   assistive device/personal items within reach   bed alarm on   chair alarm on   safety round/check completed   mobility aid in reach   nonskid shoes/slippers when out of bed   fall prevention program maintained     Problem: Risk for Delirium  Goal: Optimal Coping  Outcome: No Change     Problem: Risk for Delirium  Goal: Improved Behavioral Control  Outcome: No Change     Problem: Pain Acute  Goal: Acceptable Pain Control and Functional Ability  Outcome: No Change     Problem: Fall Injury Risk  Goal: Absence of Fall and Fall-Related Injury  Intervention: Promote Injury-Free Environment  Recent Flowsheet Documentation  Taken 2/11/2022 1130 by Nolan Cordon RN  Safety Promotion/Fall Prevention:   activity supervised   assistive device/personal items within reach   bed alarm on   chair alarm on   safety round/check completed   mobility aid in reach   nonskid shoes/slippers when out of bed   fall prevention program maintained

## 2022-02-11 NOTE — PROVIDER NOTIFICATION
Increased redness in arms, pt reports being more itchy. Gave benadryl. Paged resident.    @2966 - spoke to resident, will discuss with pharmacy.

## 2022-02-11 NOTE — PLAN OF CARE
Problem: Adult Inpatient Plan of Care  Goal: Plan of Care Review  Outcome: Improving   Continue with red rash R arm, minimal on L arm,  neck, back, legs.  Minimal Itchy, improving. No other symptoms. VSS.

## 2022-02-11 NOTE — PROGRESS NOTES
Infectious Disease Progress Note    Assessment/Plan  Impression: Weakness and balance problems, likely secondary to new stroke.     RUL pnuemonia on CXR and CT     Patient also found to aspirate, so aspiration pneumonia is a possibility.     Single positive blood culture with Gemella and S mitis.  Likely contaminant, but those organisms can be associated with pneumonia.     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 Not clear this is from clindamycin.    Other than antibiotics, only other new drug is pantoprazole (was on omeprazole at home).     Recommendations: change antibiotics to clindamycin orally for 10 days.     If not tolerated, could try metronidazole and ceftin (but just had ceftriaxone dc'd, which may also have been trigger)      Active Problems:    Left-sided weakness    Pneumonia of right upper lobe due to infectious organism      WOO DAVIS MD  [phone]      Subjective  Feel better. Eating some. Rash better on back, more hivelike on RUE.    Objective    Vital signs in last 24 hours  Temp:  [97.6  F (36.4  C)-98.5  F (36.9  C)] 97.6  F (36.4  C)  Pulse:  [69-82] 69  Resp:  [16-18] 16  BP: (111-136)/(54-63) 111/54  SpO2:  [92 %-97 %] 93 %  Wt Readings from Last 3 Encounters:   02/10/22 53.6 kg (118 lb 1.6 oz)   05/04/21 51.4 kg (113 lb 4.8 oz)   08/21/20 49.9 kg (110 lb)       Intake/Output last 3 shifts  I/O last 3 completed shifts:  In: 360 [P.O.:360]  Out: 1625 [Urine:1625]  Intake/Output this shift:  I/O this shift:  In: 600 [P.O.:600]  Out: 650 [Urine:650]    Review of Systems   Pertinent items are noted in HPI., otherwise negative.    Physical Exam  General appearance: alert, appears stated age, cooperative and thin  Head: Normocephalic, without obvious abnormality, atraumatic  Eyes: EOMI, no icterus  Neck: no adenopathy and supple, symmetrical, trachea midline  Lungs: few crackles heard.  Heart: RRR, no murmur  Abdomen: soft, non-tender; bowel sounds  normal; no masses,  no organomegaly  Extremities: warm and dry  Skin: erythroderma is improved on back, more noticeable on RUE  Neurologic: Grossly normal  Psychiatric: Normal    Pertinent Labs   Lab Results: personally reviewed.     Recent Labs   Lab 02/10/22  0752 02/08/22  0743 02/07/22  0011   WBC 4.5 9.2 16.3*   HGB 8.9* 9.1* 10.3*   HCT 30.6* 30.7* 35.6    391 382        Recent Labs   Lab 02/10/22  0752 02/08/22  0743 02/07/22  0011    143 139   CO2 25 22 25   BUN 7* 8 12     No results found for: CULT    0136 02/10/2022 1009 Blood Culture Peripheral Blood [[id number]]    (Abnormal)   Peripheral Blood    Preliminary result Component Value   Culture Positive on the 1st day of incubation Abnormal  P    Gemella species Panic  P    1 of 2 bottles   Referred to research section for identification.    Streptococcus mitis group Panic  P    1 of 2 bottles                 Collected Updated Procedure Result Status    02/07/2022 0139 02/07/2022 0310 Symptomatic; Unknown Influenza A/B & SARS-CoV2 (COVID-19) Virus PCR Multiplex Nose [[id number]]    Swab from Nose    Final result Component Value   Influenza A PCR Negative   Influenza B PCR Negative   SARS CoV2 PCR Negative   NEGATIVE: SARS-CoV-2 (COVID-19) RNA not detected, presumed negative.                       02/07/2022 0136 02/09/2022 0931 Blood Culture Peripheral Blood [[id number]]   Peripheral Blood    Preliminary result Component Value   Culture No growth after 2 days P           02/07/2022 0136 02/08/2022 0129 Verigene GP Panel [[id number]]    Peripheral Blood    Final result Component Value   Staphylococcus species Not Detected   Staphylococcus aureus Not Detected   Staphylococcus epidermidis Not Detected   Staphylococcus lugdunensis Not Detected   Enterococcus faecalis Not Detected   Enterococcus faecium Not Detected   Streptococcus species Not Detected   Streptococcus agalactiae Not Detected   Streptococcus anginosus group Not Detected    Streptococcus pneumoniae Not Detected   Streptococcus pyogenes Not Detected   Listeria species Not Detected            Pertinent Radiology   Echocardiogram Complete    Result Date: 2022  [id number]^[place] [address]  Name: CHELITA SAWANT MRN: [id number] : 1945 Study Date: 2022 11:22 AM Age: 76 yrs Gender: Female Patient Location: [facility] [address] Reason For Study: Arterial Embolism and Thrombosis Ordering Physician: AFRICA KELLY Performed By:   BSA: 1.6 m2 Height: 66 in Weight: 115 lb HR: 77 ______________________________________________________________________________ Procedure Complete Portable Echo Adult. Complete Portable Bubble Echo Adult. ______________________________________________________________________________ Interpretation Summary  1. Normal left ventricular size and systolic performance with a visually estimated ejection fraction of 70-75%. 2. There is mild to moderate aortic insufficiency. 3. There is mild, to perhaps mild-moderate, tricuspid insufficiency. 4. Normal right ventricular size and systolic performance. 5. There is mild left atrial enlargement. 6. Right ventricular systolic pressure relative to right atrial pressure is mildly increased. The pulmonary artery pressure is estimated to be 40 mmHg plus right atrial pressure (the IVC is of normal caliber). 7. Echo contrast examination was performed using agitated NS as contrast agent. The right heart opacity was good and the left heart was well visualized. There was no evidence of right to left shunting during spontaneous respiration or following release of Valsalva.  When compared to the prior real-time echocardiogram dated 10 August 2020, there has been a slight increase in the degree of aortic insufficiency. There has been a mild decrease in the pulmonary artery pressure estimate. Otherwise, the findings are felt to be fairly similar on both  examinations. ______________________________________________________________________________ Left ventricle: Normal left ventricular size and systolic performance with a visually estimated ejection fraction of 70-75%. There is normal regional wall motion. Left ventricular wall thickness is normal.  Assessment of LV Diastolic Function: The cumulative findings suggest impaired diastolic filling [The septal e' velocity is > 7 cm/s & lateral e' velocity is < 10 cm/s. The average E/e' is < 14. TR velocity is > 2.8 m/s. Left atrial volume index is greater than 34 mL/mÂ ].  Assessment of left atrial pressure (LAP): The cumulative findings suggest moderately elevated left atrial pressure (the E/A is > 0.8 and <2.0 plus 2 or 3 of 3 of the following present: Average E/e' > 14, TRvel > 2.8 m/s, and/or LA vol. index > 34 ml/mÂ  ).  Right ventricle: Normal right ventricular size and systolic performance.  Left atrium: There is mild left atrial enlargement.  Right atrium: The right atrium is of normal size.  IVC: The IVC is of normal caliber.  Aortic valve: The aortic valve is comprised of three cusps. There is no significant aortic stenosis. There is mild to moderate aortic insufficiency.  Mitral valve: The mitral valve appears morphologically normal. There is trace mitral insufficiency.  Tricuspid valve: The tricuspid valve is grossly morphologically normal. There is mild, to perhaps mild-moderate, tricuspid insufficiency.  Pulmonic valve: The pulmonic valve is grossly morphologically normal.  Thoracic aorta: The aortic root and proximal ascending aorta are of normal dimension.  Pericardium: There is no significant pericardial effusion. ______________________________________________________________________________ ______________________________________________________________________________ MMode/2D Measurements & Calculations IVSd: 1.2 cm LVIDd: 3.8 cm LVIDs: 2.4 cm LVPWd: 0.77 cm FS: 36.5 % LV mass(C)d: 115.8 grams LV  mass(C)dI: 73.3 grams/m2 Ao root diam: 2.9 cm LA dimension: 3.1 cm asc Aorta Diam: 2.8 cm LA/Ao: 1.1 LVOT diam: 1.8 cm LVOT area: 2.6 cm2  LA Volume Indexed (AL/bp): 42.0 ml/m2 RWT: 0.41  Doppler Measurements & Calculations MV E max dave: 118.0 cm/sec MV A max dave: 136.5 cm/sec MV E/A: 0.86 MV dec slope: 516.2 cm/sec2 MV dec time: 0.23 sec Ao V2 max: 188.9 cm/sec Ao max P.0 mmHg Ao V2 mean: 119.7 cm/sec Ao mean P.9 mmHg Ao V2 VTI: 39.2 cm MARIE(I,D): 1.9 cm2 MARIE(V,D): 2.0 cm2 AI P1/2t: 671.3 msec LV V1 max P.8 mmHg LV V1 max: 148.1 cm/sec LV V1 VTI: 28.7 cm SV(LVOT): 74.6 ml SI(LVOT): 47.2 ml/m2 PA V2 max: 0.48 cm/sec PA max P.00 mmHg PA acc time: 0.10 sec TR max dave: 315.8 cm/sec TR max P.9 mmHg AV Dave Ratio (DI): 0.78 MARIE Index (cm2/m2): 1.2 E/E' av.5 Lateral E/e': 13.9  Medial E/e': 11.2  ______________________________________________________________________________ Report approved by: Denny Howe 2022 12:57 PM       XR Chest Port 1 View    Result Date: 2022  EXAM: XR CHEST PORT 1 VIEW LOCATION: [facility] DATE/TIME: 2022 10:07 AM INDICATION: follow up possible infiltrates COMPARISON: 2022.     IMPRESSION: There is increased airspace opacity in the right mid to upper lung consistent with right-sided pneumonia. Left lung is clear. No pneumothorax or pleural effusion. The heart size and pulmonary vascularity are normal. NOTE: ABNORMAL REPORT THE DICTATION ABOVE DESCRIBES AN ABNORMALITY FOR WHICH FOLLOW-UP IS NEEDED.     XR Chest Port 1 View    Result Date: 2022  EXAM: XR CHEST PORT 1 VIEW LOCATION: [facility] DATE/TIME: 2022 12:51 AM INDICATION: Fever. COMPARISON: 2020.     IMPRESSION: Question some mild hazy right perihilar infiltrate with a mild or low-grade pneumonitis not excluded. Left lung is clear. Normal heart size and pulmonary vascularity. Osteopenia. Remainder unremarkable.    CTA Head  Neck with Contrast    Result Date: 2/7/2022  EXAM: CTA  HEAD NECK WITH CONTRAST LOCATION: [facility] DATE/TIME: 2/7/2022 12:02 AM INDICATION: Code Stroke to evaluate for potential thrombolysis and thrombectomy.  PLEASE READ IMMEDIATELY. Left-sided weakness and slurred speech. COMPARISON: Head CT 02/06/2019. CONTRAST: 75 ml Isovue 370. TECHNIQUE: Head and neck CT angiogram with IV contrast. Noncontrast head CT followed by axial helical CT images of the head and neck vessels obtained during the arterial phase of intravenous contrast administration. Axial 2D reconstructed images and multiplanar 3D MIP reconstructed images of the head and neck vessels were performed by the technologist. Dose reduction techniques were used. All stenosis measurements made according to NASCET criteria unless otherwise specified. FINDINGS: NONCONTRAST HEAD CT: INTRACRANIAL CONTENTS: No intracranial hemorrhage, extraaxial collection, or mass effect. No CT evidence of acute infarct. Mild presumed chronic small vessel ischemic changes. Mild generalized volume loss. No hydrocephalus. There are small chronic infarcts in the bilateral cerebellar hemispheres. VISUALIZED ORBITS/SINUSES/MASTOIDS: No intraorbital abnormality. No paranasal sinus mucosal disease. No middle ear or mastoid effusion. BONES/SOFT TISSUES: No acute abnormality. HEAD CTA: ANTERIOR CIRCULATION: No major vessel intraluminal filling defect, flow-limiting stenosis or occlusion. Mild to moderate atheromatous changes in the carotid siphons. Within the limits of CTA, no convincing aneurysm or high flow vascular lesion. Standard [place] anatomy. POSTERIOR CIRCULATION: No major vessel intraluminal filling defect, flow-limiting stenosis or occlusion. There are mild scattered atheromatous changes. Dominant right and smaller left vertebral artery contribute to a normal basilar artery. DURAL VENOUS SINUSES: Expected enhancement of the major dural venous  sinuses. NECK CTA: RIGHT CAROTID: No measurable stenosis or dissection. LEFT CAROTID: No measurable stenosis or dissection. VERTEBRAL ARTERIES: No focal stenosis or dissection. Dominant right and smaller left vertebral arteries. AORTIC ARCH: Vascular variant aortic arch origin left vertebral artery.  No significant stenosis at the origin of the great vessels. NONVASCULAR STRUCTURES: Right upper lobe airspace infiltrate. Underlying mild centrilobular emphysema.     IMPRESSION: HEAD CT: 1.  No CT evidence of acute intracranial hemorrhage, mass or recent infarct. 2.  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 3.  Mild volume loss and presumed chronic small vessel ischemic changes. HEAD CTA: 1.  No major vessel acute thromboembolism, flow-limiting stenosis or occlusion. 2.  Evidence of mild intracranial atherosclerosis. NECK CTA: 1.  Normal neck CTA. 2.  Evidence of a right upper lobe infiltrate. Correlate for pneumonia. 3.  Underlying mild centrilobular emphysema. Results were called to Dr. Park at 12:39 AM on 02/07/2022.    CT Head w/o Contrast    Result Date: 2/8/2022  EXAM: CT HEAD W/O CONTRAST LOCATION: [facility] DATE/TIME: 2/8/2022 1:22 PM INDICATION: Left-sided weakness. COMPARISON: Head and neck CTA 02/07/2022. TECHNIQUE: Routine CT Head without IV contrast. Multiplanar reformats. Dose reduction techniques were used. FINDINGS: INTRACRANIAL CONTENTS: There is no evidence of acute large territorial infarction, acute intracranial hemorrhage, or mass lesion. Chronic infarcts in the bilateral cerebellar hemispheres appear unchanged from the prior study. Mild scattered nonspecific hypodensity in the cerebral white matter appears stable from the prior study. Mild prominence of the ventricles, sulci, and cisterns consistent with mild diffuse brain parenchymal volume loss appears stable from the prior study. There is no evidence of hydrocephalus. VISUALIZED ORBITS/SINUSES/MASTOIDS: No  intraorbital abnormality. There is a tiny mucous retention cyst in the right sphenoid sinus. The remainder of the visualized paranasal sinuses are clear. There is trace opacification of the inferior left mastoid air  cells. The right mastoid air cells are clear. BONES/SOFT TISSUES: No acute abnormality.     IMPRESSION: 1.  No evidence of acute large territorial infarction, acute intracranial hemorrhage, or mass lesion. 2.  Stable chronic bilateral cerebellar infarcts. 3.  Stable mild chronic small vessel ischemic change and mild diffuse brain parenchymal volume loss.    XR Video Swallow with SLP or OT    Result Date: 2/7/2022  EXAM: XR VIDEO SWALLOW WITH SLP OR OT LOCATION: [facility] DATE/TIME: 2/7/2022 11:35 AM INDICATION: Difficulty swallowing. COMPARISON: None. TECHNIQUE: Routine swallow study with speech pathology using multiple barium thicknesses. FINDINGS: FLUOROSCOPIC TIME: 1 minutes. NUMBER OF IMAGES: 0 Swallow study with Speech Pathology using multiple barium thicknesses. Trace amount of aspiration is seen with thin barium consistency. No other evidence of laryngeal penetration or aspiration is seen with other barium consistencies. Please see dedicated Speech Pathology report for further details of examination.     EEG Awake or Drowsy Routine    Result Date: 2/8/2022  EEG report DATE Feb 8, 2022 CLINICAL HISTORY This is a 76 year old female with asterixis. The EEG is done to look for underlying epilepsy. INTRODUCTION This is a routine EEG performed utilizing standard 10- 20 system of electrode placement with 20 channels of recording including one channel of EKG monitor. The patient was studied in awake and drowsy state. Photic stimulation was done. EEG TIME: 29 min DESCRIPTION OF THE RECORD The EEG background consists of a 9 to 10 Hz posterior dominant rhythm that is symmetric. No convincing epileptiform abnormalities were noted. Normal responses noted to photic stimulation. Mild  generalized background dysrhythmia is present. IMPRESSION/REPORT/PLAN This is a normal EEG during wakefulness and drowsiness except for mild generalized background dysrhythmia. EEG is not suggestive of ongoing seizures or encephalopathy. Further clinical correlation is needed. Please note that the absence of epileptiform abnormalities does not exclude the possibility of epilepsy in any patient.

## 2022-02-11 NOTE — PLAN OF CARE
Problem: Adult Inpatient Plan of Care  Goal: Absence of Hospital-Acquired Illness or Injury  Intervention: Identify and Manage Fall Risk  Recent Flowsheet Documentation  Taken 2/10/2022 1600 by Misha Henry RN  Safety Promotion/Fall Prevention: activity supervised    Bed alarm, call light in reach, calls appropriately. Walker and gaitbelt to bathroom, 1 assist.     NSR, afebrile. Started on cleocin this evening.     Benadryl for itchy rash, reports more red today.

## 2022-02-11 NOTE — PLAN OF CARE
Problem: Adult Inpatient Plan of Care  Goal: Plan of Care Review  2/11/2022 0708 by Maxine Hernandez, RN  Outcome: Improving  2/11/2022 0030 by Maxine Hernandez, RN  Outcome: Improving     Problem: Adult Inpatient Plan of Care  Goal: Optimal Comfort and Wellbeing  Outcome: Improving   Patient VSS, denies pain or discomfort, denies itchy, rash with no changes.

## 2022-02-11 NOTE — SIGNIFICANT EVENT
Significant Event Note    Time of event: 10:38 PM February 10, 2022    Description of event:  Possible medication reaction to clindamycin    Plan:  Patient complaining of increased rash on her arms.  Has been present since IV contrast.  Had received oral clindamycin shortly before this was noted.  Patient does have a PCN allergy, but likely would have reacted to the ceftriaxone more than the clindamycin.  Received benadryl to no relief of symptoms.  At this time will monitor for signs of anaphylaxis.  Spoke with pharmacy who agreed it did not necessarily sound like an allergic reaction.  Will continue to monitor vital signs overnight.      Tim Connor MD

## 2022-02-11 NOTE — PROGRESS NOTES
Care Management Follow Up    Length of Stay (days): 4        Patient plan of care discussed at interdisciplinary rounds: Yes     Expected Discharge Date:   2/12/22       Concerns to be Addressed / Barriers to Discharge: medical management     Anticipated Discharge Disposition: home    Anticipated Discharge Services:  Home PT    Anticipated Discharge DME:      Patient/family educated on Medicare website which has current facility and service quality ratings:  Previously done  Education Provided on the Discharge Plan:     Patient/Family in Agreement with the Plan:       Referrals Placed by CM/SW: previously done  Private pay costs discussed: Not applicable     Additional Information:  2/11/2022  10:18 AM Called and spoke with [facility] intake @ [facility].  Re: CM note from 2/10 about possible home care vs antibiotic decision (see CM note 2/10 for further details). Per [facility], if patient does not need IV antibiotics then they are able to accept patient. While on phone, received update from Hospitalist - no discharge today, plan for discharge tomorrow 2/12/22. Called and updated [facility] on plan for discharge tomorrow.

## 2022-02-11 NOTE — PROGRESS NOTES
"[facility]    PROGRESS NOTE - Hospitalist Service    Assessment and Plan    76 year old female with past medical history of hyperlipidemia, migraine who presents with fever, generalized weakness, she was found to to have pneumonia.      Generalized weakness/tremors and asterixis  - Generalized weakness intermittent episodes of slurred speech, reports left side more weak than the right.  -Initially stroke code was called at ED, then the deescalated  -CT and CT angiogram no acute changes or major vessel occlusions, also with no objective focal deficits on exam  - Patient declined MRI, she wants to be \" totally out\" just like when she had her colonoscopy, declined to try even with Ativan.   -Repeated CT head is negative for significant acute changes   - Neurology consult, appreciate input  - EEG is negative for epilepsy  - Echo shows EF of 70 to 75% with worsening aortic insufficiency  - Continue Keppra for asterixis as recommended by neurology     Right upper lobe aspiration pneumonia  -Present on chest x-ray and CT chest  - Patient also has aspiration on speech evaluation  -WBC 16.3, chest x-ray with right perihilar infiltrate- no productive cough, no fever; procalcitonin wnl  -Started initially on ceftriaxone and azithromycin, incentive spirometry started on admission -> developed rash allover; hold rocephin (h/o PCN allergy) and continue Zithromax  -Started on oral clindamycin yesterday as per ID     Gram-positive bacteremia  - blood cx positive for GPC in cluster; bacteriemia or contamination-> oncall doc started on Vanco-  - repeat chest x-ray shows increased opacity in the right mid/upper lung consistent with right-sided pneumonia  - ID consultation, appreciate input  -Change vancomycin to clindamycin as recommended by ID     Mild aspiration  - Speech therapy fo swallow eval and order diet accordingly     Hypokalemia and hypomagnesemia  -replace as per protocol   -Continue to monitor " level     Acute skin rash  -Patient claims that she is allergic to iv contrast for ct vs to Rocephin (h/o pcn allergy with rash)  - Improving initially but got worse on 2/10/2022 evening after clindamycin,   - I do not see any significant rash changes today  - We will continue clindamycin and continue to monitor  - continue Benadryl prn     Iron deficiency anemia  -Resume oral iron, monitor Hgb serially     Weakness and deconditioning  - PT/OT evaluation  - Patient would need TCU on discharge but she declined  -Plan for home care on discharge    Acute on retention  - Abrams cath was placed on admission  - Discontinue Abrams and voiding trial today    Constipation  - Secondary to poor ambulation  - Start bowel regimen    Active Problems:    Left-sided weakness    Pneumonia of right upper lobe due to infectious organism      VTE prophylaxis:  Enoxaparin (Lovenox) SQ  DIET: Orders Placed This Encounter      Combination Diet Easy to Chew (level 7); Thin Liquids (level 0); No Straws      Disposition/Barriers to discharge: Monitor skin rash  Code Status: Full Code    Subjective:  Julisa is feeling about the same today, denies any chest pain or shortness of breath.  Has worsening rash overnight.    PHYSICAL EXAM  Vitals:    02/07/22 2049 02/09/22 1928 02/10/22 2000   Weight: 52.2 kg (115 lb) 52.6 kg (115 lb 14.4 oz) 53.6 kg (118 lb 1.6 oz)     B/P:111/54 T:97.6 P:69 R:16     Intake/Output Summary (Last 24 hours) at 2/11/2022 1325  Last data filed at 2/11/2022 1227  Gross per 24 hour   Intake 720 ml   Output 2275 ml   Net -1555 ml      Body mass index is 19.06 kg/m .    Constitutional: awake, alert, cooperative, no apparent distress, and appears stated age  Eyes: Lids and lashes normal, pupils equal, round and reactive to light, extra ocular muscles intact, sclera clear, conjunctiva normal  ENT: Normocephalic, without obvious abnormality, atraumatic, sinuses nontender on palpation, external ears without lesions, oral pharynx  with moist mucous membranes, tonsils without erythema or exudates, gums normal and good dentition.  Respiratory: No increased work of breathing, good air exchange, clear to auscultation bilaterally, no crackles or wheezing  Cardiovascular: Normal apical impulse, regular rate and rhythm, normal S1 and S2, no S3 or S4, and no murmur noted  GI: No scars, normal bowel sounds, soft, non-distended, non-tender, no masses palpated, no hepatosplenomegally  Skin: Improving erythematous hives on lower extremities  Musculoskeletal: There is no redness, warmth, or swelling of the joints.  Full range of motion noted.  no lower extremity pitting edema present  Neurologic: Awake, alert, oriented to name, place and time.  Cranial nerves II-XII are grossly intact.  Motor is 5 out of 5 bilaterally.   Sensory is intact.    Neuropsychiatric: Appropriate with examiner      PERTINENT LABS/IMAGING:  Recent Labs   Lab 02/11/22  0745 02/10/22  0752 02/09/22  0747 02/08/22  1532 02/08/22  0743 02/07/22  0019 02/07/22  0012 02/07/22  0011   WBC  --  4.5  --   --  9.2  --   --  16.3*   HGB  --  8.9*  --   --  9.1*  --   --  10.3*   MCV  --  80  --   --  79  --   --  81   PLT  --  387  --   --  391  --   --  382   INR  --   --   --   --   --   --  1.05  --    NA  --  144  --   --  143  --   --  139   POTASSIUM 3.5 3.5 3.7   < > 3.1*  --   --  3.7   CHLORIDE  --  111*  --   --  112*  --   --  106   CO2  --  25  --   --  22  --   --  25   BUN  --  7*  --   --  8  --   --  12   CR  --  0.63  --   --  0.58* 0.6  --  0.73   ANIONGAP  --  8  --   --  9  --   --  8   ADY  --  8.1*  --   --  7.8*  --   --  8.7   GLC  --  84  --   --  94  --   --  138*   ALBUMIN  --  2.3*  --   --   --   --   --  3.3*   PROTTOTAL  --  5.2*  --   --   --   --   --  6.8   BILITOTAL  --  0.1  --   --   --   --   --  0.2   ALKPHOS  --  96  --   --   --   --   --  138*   ALT  --  11  --   --   --   --   --  17   AST  --  14  --   --   --   --   --  14    < > = values in this  interval not displayed.     No results found for this or any previous visit (from the past 24 hour(s)).    Discussed with patient, family, ID, nursing staff and discharge planner    Michael Hernandez MD  [facility] Medicine [facility]  [phone]

## 2022-02-11 NOTE — PROGRESS NOTES
NEUROLOGY PROGRESS NOTE     Julisa Chaney,  1945, MRN [id number] Date: 2/10/2022     [facility]   Code status:  Full Code   PCP: Mara Murillo, [phone]      ASSESSMENT & PLAN   Diagnosis code: Left arm weakness/asterixis    Left arm weakness  Spells of slurred speech  Complains of generalized weakness with fever  Low B12  Bilateral asterixis      Patient has unilateral asterixis in the left upper extremity which could be secondary to a structural lesion or could be more metabolic.  Epileptic lesions are also a possibility.     Patient today on exam had bilateral asterixis though still worse on the left side compared to the right    Head CT negative for acute stroke    Repeat Head CT negative for acute stroke-do not suspect cerebrovascular disease    Started on aspirin today.    Will hold off on MRI for right now.    CT angiogram negative for any significant large vessel occlusion stenosis    EEG negative for epilepsy    Electrolytes noncontributory.  Ammonia normal.    Hepatic panel, UA noncontributory    Echocardiogram shows 70-75% ejection fraction.  No PFO.    Lipid panel and statin    Also check B12/TSH--B12 significantly low; recommend replacement    PT/OT    Hold off on antiepileptic medication since asterixis not resolved    Seizure precautions    Possibly asterixis related to B12 deficiency versus ongoing infection    Started on Keppra as a trial.  Can be stopped down the road.    Getting IV antibiotics.  Diagnosed with right upper lobe pneumonia    Discharge:-Per primary team.  Possible discharge tomorrow.  Could follow-up as an outpatient for further management of her Keppra.       Chief Complaint   Patient presents with     Generalized Weakness     Decreased Appetite     Slurred Speech        HISTORY OF PRESENT ILLNESS     We have been requested by Dr. Ledesma to evaluate Julisa Chaney who is a 76 year old  female for left-sided weakness.  Patient is unable to provide limited  history.  She reports she woke up and her arms were shaking she could not grab of anything.  She has not had similar symptoms in the past.  Reports no chest pains palpitations or headaches.  Does not take aspirin at baseline.  No other blood thinners.  Per patient she was having some difficulty finding words as well.  Currently she reports symptoms have improved though continues to have issues with her left arm.  Presentation to the ER and stroke code was called the patient was done in the window for IV thrombolytics.  CT angiogram not show any large vessel occlusion/stenosis.  MRI brain is currently pending even though the patient reports that she had this done this morning.    Per the admission H&P:-Patient does have a history of migraines who presented with generalized weakness.  Has been having progressive weakness for the last 3 days associated with poor appetite and diarrhea which is now resolved.  Patient reports more left upper extremity weakness on yesterday.  Also had episodes of slurred speech and forgetfulness over the last few days.  Noted to be febrile in the ED the patient did not notice that.  Denies any vision changes chest pain shortness of breath palpitations.    2/8  Patient adamant that she will not do the MRI without sedation.  She needs to be completely knocked out.  She is willing to do a head CT today.  EEG also done this morning.  Reports that she is feeling much more alert.  Reports no other new issues.    2/9  Patient reports that she feels better.  Is to have asterixis in both upper extremities.  Denies any other weakness numbness.  Head CT was done yesterday.  Is willing to do an echocardiogram today.    2/10  Continues to have asterixis.  Would like to try other medications.  In the B12 supplementation.  Is willing to try aspirin as well.     PAST MEDICAL & SURGICAL HISTORY     Medical History  Past Medical History:   Diagnosis Date     High cholesterol      Migraines      Sepsis (H)  "8/10/2020     Surgical History  Past Surgical History:   Procedure Laterality Date     HYSTERECTOMY       DE ESOPHAGOGASTRODUODENOSCOPY TRANSORAL DIAGNOSTIC N/A 2020    Procedure: ESOPHAGOGASTRODUODENOSCOPY (EGD);  Surgeon: Nathan Smalls MD;  Location: [facility];  Service: Gastroenterology     DE ESOPHAGOGASTRODUODENOSCOPY TRANSORAL DIAGNOSTIC N/A 2020    Procedure: ESOPHAGOGASTRODUODENOSCOPY (EGD), PYLORIC DILATION;  Surgeon: Nathan Smalls MD;  Location: [facility];  Service: Gastroenterology     [facility] COLONOSCOPY W/WO BRUSH/WASH N/A 2020    Procedure: COLONOSCOPY WITH POLYPECTOMY;  Surgeon: Nathan Smalls MD;  Location: [facility];  Service: Gastroenterology        SOCIAL HISTORY     Social History     Tobacco Use     Smoking status: Former Smoker     Packs/day: 1.00     Years: 50.00     Pack years: 50.00     Quit date: 2014     Years since quittin.2     Smokeless tobacco: Never Used   Substance Use Topics     Alcohol use: No     Drug use: No        FAMILY HISTORY     Reviewed, and family history includes Breast Cancer in her maternal aunt, mother, sister, and sister; Cancer in her father; Testicular cancer (age of onset: 17.00) in her grandchild.     ALLERGIES     Allergies   Allergen Reactions     Contrast [Iohexol] Rash     Noticed during 2020 admission. Received IV contrast on      Chocolate Headache     Mirtazapine Other (See Comments)     \"Needles to the face\"     Penicillins Rash        REVIEW OF SYSTEMS     12 system ROS was noted in the HPI this was negative.  Pertinent positives noted in the HPI.     HOME & HOSPITAL MEDICATIONS     Prior to Admission Medications  Medications Prior to Admission   Medication Sig Dispense Refill Last Dose     acetaminophen-codeine (TYLENOL #3) 300-30 mg per tablet [ACETAMINOPHEN-CODEINE (TYLENOL #3) 300-30 MG PER TABLET] Take 1 tablet by mouth every 6 (six) hours as needed for pain. 120 tablet 0 Past Week at " Unknown time     ARTIFICIAL TEARS 1.4 % ophthalmic solution INSTILL 2 DROPS IN BOTH EYES AS NEEDED (Patient taking differently: Place 2 drops into both eyes every hour as needed ) 15 mL 0 Past Week at Unknown time     Aspirin-Caffeine 400-32 MG TABS Take 1 tablet by mouth daily as needed   Past Week at Unknown time     cholecalciferol, vitamin D3, 50 mcg (2,000 unit) Tab [CHOLECALCIFEROL, VITAMIN D3, 50 MCG (2,000 UNIT) TAB] Take 1 tablet (2,000 Units total) by mouth daily. One tab daily 90 tablet 4 2/6/2022 at Unknown time     desonide (DESOWEN) 0.05 % cream [DESONIDE (DESOWEN) 0.05 % CREAM] Apply to affected area 2 times daily (Patient taking differently: Apply topically 2 times daily as needed ) 60 g 1 Past Month at Unknown time     ferrous sulfate 325 (65 FE) MG tablet [FERROUS SULFATE 325 (65 FE) MG TABLET] Take 1 tablet (325 mg total) by mouth daily with breakfast. 30 tablet 0 Past Month at Unknown time     multivitamin (DAILY-DAVID) per tablet [MULTIVITAMIN (DAILY-DAVID) PER TABLET] Take 1 tablet by mouth daily. 100 tablet 3 Past Week at Unknown time     nortriptyline (PAMELOR) 25 MG capsule TAKE 2 CAPSULES BY MOUTH TWICE DAILY (Patient taking differently: Take 50 mg by mouth 2 times daily ) 360 capsule 1 2/6/2022 at AM     omeprazole (PRILOSEC) 40 MG DR capsule TAKE 1 CAPSULE(40 MG) BY MOUTH DAILY BEFORE BREAKFAST (Patient taking differently: Take 40 mg by mouth daily ) 90 capsule 1 2/6/2022 at AM     OXcarbazepine (TRILEPTAL) 150 MG tablet TAKE 3 TABLETS(450 MG) BY MOUTH AT BEDTIME (Patient taking differently: Take 450 mg by mouth At Bedtime ) 270 tablet 2 2/5/2022 at PM     topiramate (TOPAMAX) 50 MG tablet TAKE 1 TABLET BY MOUTH EVERY DAY (Patient taking differently: Take 50 mg by mouth At Bedtime ) 90 tablet 2 2/5/2022 at PM       Hospital Medications    aspirin  81 mg Oral Daily     [Held by provider] cefTRIAXone  1 g Intravenous Q24H     clindamycin  300 mg Oral Q6H MARCIA     cyanocobalamin  1,000 mcg  "Sublingual Daily     enoxaparin ANTICOAGULANT  40 mg Subcutaneous Q24H     ferrous sulfate  325 mg Oral Daily with breakfast     levETIRAcetam  250 mg Oral BID     LORazepam  1 mg Intravenous Once     nortriptyline  50 mg Oral BID     OXcarbazepine  450 mg Oral At Bedtime     pantoprazole  40 mg Oral QAM AC     sodium chloride (PF)  3 mL Intracatheter Q8H     topiramate  50 mg Oral At Bedtime        PHYSICAL EXAM     Vital signs  Temp:  [97.6  F (36.4  C)-98.5  F (36.9  C)] 98.5  F (36.9  C)  Pulse:  [65-82] 75  Resp:  [16-18] 16  BP: (108-128)/(53-58) 123/58  SpO2:  [92 %-97 %] 92 %    PHYSICAL EXAMINATION  VITALS: /58 (BP Location: Right arm)   Pulse 75   Temp 98.5  F (36.9  C) (Oral)   Resp 16   Ht 1.676 m (5' 6\")   Wt 53.6 kg (118 lb 1.6 oz)   SpO2 92%   BMI 19.06 kg/m    GENERAL -Health appearing, No apparent distress  EYES- No scleral icterus, no eyelid droop, Pupils - see Neuro section  HEENT - Normocephalic, atraumatic, Hearing grossly intact; Oral mucosa moist and pink in color. External Ears and nose intact.   Neck - supple with no obvious lymphadenopathy or thyromegaly  PULM - Good spontaneous respiratory effort; Normal palpation of chest.   CV- Pedal pulses present with no peripheral edema/ No significant varicosities.  MSK- Gait - see Neuro section; Strength and tone- see Neuro section; Range of motion grossly intact.  PSYCH -cooperative    Neurological  Mental status - Patient is awake and oriented to self, place and time. Attention span is normal. Language is fluent and follows commands appropriately.   Cranial nerves - CN II-XII intact. Pupils are reactive and symmetric; EOMI, VFIT, NLF symmetric  Motor - There is no pronator drift. Motor exam shows 5/5 strength in all extremities.  Tone - Tone is symmetric bilaterally in upper and lower extremities.  Mild asterixis in both extremities.  Left worse than right  Coordination - Finger to nose and heel to shin is without dysmetria except in " the left arm due to the asterixis  Gait and station --unable to safely ambulate.  Formal gait testing cannot be done due to safety concerns from ongoing medical issues.  Asterixis is present today.  No change today.       DIAGNOSTIC STUDIES     Pertinent Radiology   CTA  HEAD CT:  1.  No CT evidence of acute intracranial hemorrhage, mass or recent infarct.  2.  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  3.  Mild volume loss and presumed chronic small vessel ischemic changes.     HEAD CTA:   1.  No major vessel acute thromboembolism, flow-limiting stenosis or occlusion.  2.  Evidence of mild intracranial atherosclerosis.     NECK CTA:  1.  Normal neck CTA.  2.  Evidence of a right upper lobe infiltrate. Correlate for pneumonia.  3.  Underlying mild centrilobular emphysema.    ECHO 2020  . Normal left ventricular size and systolic performance with a visually estimated ejection fraction of 60-65%.   2. There is mild aortic insufficiency.   3. Normal right ventricular size and systolic performance.  4. No obvious valvular vegetations identified on this study.  5. Right ventricular systolic pressure relative to right atrial pressure is moderately increased.  The pulmonary artery pressure is estimated to be 50 mmHg plus right atrial pressure (the IVC is of fairly normal caliber).     Head CT  IMPRESSION:  1.  No evidence of acute large territorial infarction, acute intracranial hemorrhage, or mass lesion.  2.  Stable chronic bilateral cerebellar infarcts.  3.  Stable mild chronic small vessel ischemic change and mild diffuse brain parenchymal volume loss.    EEG  This is a normal EEG during wakefulness and drowsiness except for mild generalized background dysrhythmia. EEG is not suggestive of ongoing seizures or encephalopathy. Further clinical correlation is needed.     Please note that the absence of epileptiform abnormalities does not exclude the possibility of epilepsy in any patient.     ECHO  1. Normal  left ventricular size and systolic performance with a visually  estimated ejection fraction of 70-75%.  2. There is mild to moderate aortic insufficiency.  3. There is mild, to perhaps mild-moderate, tricuspid insufficiency.  4. Normal right ventricular size and systolic performance.  5. There is mild left atrial enlargement.  6. Right ventricular systolic pressure relative to right atrial pressure is  mildly increased. The pulmonary artery pressure is estimated to be 40 mmHg  plus right atrial pressure (the IVC is of normal caliber).  7. Echo contrast examination was performed using agitated NS as contrast  agent. The right heart opacity was good and the left heart was well  visualized. There was no evidence of right to left shunting during spontaneous  respiration or following release of Valsalva.     When compared to the prior real-time echocardiogram dated 10 August 2020,  there has been a slight increase in the degree of aortic insufficiency. There  has been a mild decrease in the pulmonary artery pressure estimate. Otherwise,  the findings are felt to be fairly similar on both examinations.    Recent Results (from the past 24 hour(s))   Magnesium    Collection Time: 02/10/22  7:52 AM   Result Value Ref Range    Magnesium 1.7 (L) 1.8 - 2.6 mg/dL   CBC with platelets    Collection Time: 02/10/22  7:52 AM   Result Value Ref Range    WBC Count 4.5 4.0 - 11.0 10e3/uL    RBC Count 3.83 3.80 - 5.20 10e6/uL    Hemoglobin 8.9 (L) 11.7 - 15.7 g/dL    Hematocrit 30.6 (L) 35.0 - 47.0 %    MCV 80 78 - 100 fL    MCH 23.2 (L) 26.5 - 33.0 pg    MCHC 29.1 (L) 31.5 - 36.5 g/dL    RDW 18.0 (H) 10.0 - 15.0 %    Platelet Count 387 150 - 450 10e3/uL   Comprehensive metabolic panel    Collection Time: 02/10/22  7:52 AM   Result Value Ref Range    Sodium 144 136 - 145 mmol/L    Potassium 3.5 3.5 - 5.0 mmol/L    Chloride 111 (H) 98 - 107 mmol/L    Carbon Dioxide (CO2) 25 22 - 31 mmol/L    Anion Gap 8 5 - 18 mmol/L    Urea Nitrogen 7  (L) 8 - 28 mg/dL    Creatinine 0.63 0.60 - 1.10 mg/dL    Calcium 8.1 (L) 8.5 - 10.5 mg/dL    Glucose 84 70 - 125 mg/dL    Alkaline Phosphatase 96 45 - 120 U/L    AST 14 0 - 40 U/L    ALT 11 0 - 45 U/L    Protein Total 5.2 (L) 6.0 - 8.0 g/dL    Albumin 2.3 (L) 3.5 - 5.0 g/dL    Bilirubin Total 0.1 0.0 - 1.0 mg/dL    GFR Estimate >90 >60 mL/min/1.73m2   Procalcitonin    Collection Time: 02/10/22  7:52 AM   Result Value Ref Range    Procalcitonin 0.03 0.00 - 0.49 ng/mL   Vancomycin level    Collection Time: 02/10/22  7:52 AM   Result Value Ref Range    Vancomycin 42.9 (HH)   mg/L       Total time spent for face to face visit, reviewing labs/imaging studies, counseling and coordination of care was: Over 30 min More than 50% of this time was spent on counseling and coordination of care.    Counseling patient.  Discussion about aspirin use and starting other medications for her asterixis.    Jamie Clarke MD  Neurologist  [facility]  Tel:- [phone]

## 2022-02-12 VITALS
BODY MASS INDEX: 18.98 KG/M2 | HEIGHT: 66 IN | DIASTOLIC BLOOD PRESSURE: 57 MMHG | HEART RATE: 60 BPM | WEIGHT: 118.1 LBS | TEMPERATURE: 98 F | SYSTOLIC BLOOD PRESSURE: 119 MMHG | RESPIRATION RATE: 18 BRPM | OXYGEN SATURATION: 94 %

## 2022-02-12 LAB
BACTERIA BLD CULT: ABNORMAL
BACTERIA BLD CULT: NO GROWTH
HOLD SPECIMEN: NORMAL
HOLD SPECIMEN: NORMAL
MAGNESIUM SERPL-MCNC: 1.8 MG/DL (ref 1.8–2.6)
POTASSIUM BLD-SCNC: 3.8 MMOL/L (ref 3.5–5)

## 2022-02-12 PROCEDURE — 99239 HOSP IP/OBS DSCHRG MGMT >30: CPT | Performed by: INTERNAL MEDICINE

## 2022-02-12 PROCEDURE — 99233 SBSQ HOSP IP/OBS HIGH 50: CPT | Performed by: INTERNAL MEDICINE

## 2022-02-12 PROCEDURE — 36415 COLL VENOUS BLD VENIPUNCTURE: CPT | Performed by: INTERNAL MEDICINE

## 2022-02-12 PROCEDURE — 84132 ASSAY OF SERUM POTASSIUM: CPT | Performed by: INTERNAL MEDICINE

## 2022-02-12 PROCEDURE — 250N000013 HC RX MED GY IP 250 OP 250 PS 637: Performed by: STUDENT IN AN ORGANIZED HEALTH CARE EDUCATION/TRAINING PROGRAM

## 2022-02-12 PROCEDURE — 87040 BLOOD CULTURE FOR BACTERIA: CPT | Performed by: INTERNAL MEDICINE

## 2022-02-12 PROCEDURE — 250N000013 HC RX MED GY IP 250 OP 250 PS 637: Performed by: INTERNAL MEDICINE

## 2022-02-12 PROCEDURE — 250N000013 HC RX MED GY IP 250 OP 250 PS 637

## 2022-02-12 PROCEDURE — 250N000013 HC RX MED GY IP 250 OP 250 PS 637: Performed by: PSYCHIATRY & NEUROLOGY

## 2022-02-12 PROCEDURE — 83735 ASSAY OF MAGNESIUM: CPT | Performed by: INTERNAL MEDICINE

## 2022-02-12 RX ORDER — CLINDAMYCIN HCL 300 MG
300 CAPSULE ORAL EVERY 6 HOURS
Qty: 36 CAPSULE | Refills: 0 | Status: SHIPPED | OUTPATIENT
Start: 2022-02-12 | End: 2022-02-21

## 2022-02-12 RX ORDER — LANOLIN ALCOHOL/MO/W.PET/CERES
1000 CREAM (GRAM) TOPICAL DAILY
Qty: 90 TABLET | Refills: 0 | Status: SHIPPED | OUTPATIENT
Start: 2022-02-12 | End: 2022-05-13

## 2022-02-12 RX ORDER — AMOXICILLIN 250 MG
1 CAPSULE ORAL AT BEDTIME
COMMUNITY
Start: 2022-02-12 | End: 2022-12-01

## 2022-02-12 RX ORDER — ASPIRIN 81 MG/1
81 TABLET, CHEWABLE ORAL DAILY
Qty: 90 TABLET | Refills: 0 | Status: SHIPPED | OUTPATIENT
Start: 2022-02-13 | End: 2022-05-14

## 2022-02-12 RX ORDER — LEVETIRACETAM 500 MG/1
500 TABLET ORAL 2 TIMES DAILY
Qty: 180 TABLET | Refills: 0 | Status: SHIPPED | OUTPATIENT
Start: 2022-02-12 | End: 2022-07-01

## 2022-02-12 RX ADMIN — POLYETHYLENE GLYCOL 3350 17 G: 17 POWDER, FOR SOLUTION ORAL at 08:47

## 2022-02-12 RX ADMIN — FERROUS SULFATE TAB 325 MG (65 MG ELEMENTAL FE) 325 MG: 325 (65 FE) TAB at 08:46

## 2022-02-12 RX ADMIN — CLINDAMYCIN HYDROCHLORIDE 300 MG: 150 CAPSULE ORAL at 05:14

## 2022-02-12 RX ADMIN — ASPIRIN 81 MG: 81 TABLET, CHEWABLE ORAL at 08:46

## 2022-02-12 RX ADMIN — LEVETIRACETAM 500 MG: 500 TABLET, FILM COATED ORAL at 08:46

## 2022-02-12 RX ADMIN — ACETAMINOPHEN AND CODEINE PHOSPHATE 1 TABLET: 300; 30 TABLET ORAL at 08:46

## 2022-02-12 RX ADMIN — CLINDAMYCIN HYDROCHLORIDE 300 MG: 150 CAPSULE ORAL at 00:37

## 2022-02-12 RX ADMIN — Medication 1000 MCG: at 08:46

## 2022-02-12 RX ADMIN — NORTRIPTYLINE HYDROCHLORIDE 50 MG: 50 CAPSULE ORAL at 08:45

## 2022-02-12 RX ADMIN — PANTOPRAZOLE SODIUM 40 MG: 20 TABLET, DELAYED RELEASE ORAL at 08:46

## 2022-02-12 RX ADMIN — CLINDAMYCIN HYDROCHLORIDE 300 MG: 150 CAPSULE ORAL at 11:53

## 2022-02-12 ASSESSMENT — ACTIVITIES OF DAILY LIVING (ADL)
ADLS_ACUITY_SCORE: 16

## 2022-02-12 NOTE — DISCHARGE SUMMARY
"[facility]  Hospitalist Discharge Summary      Date of Admission:  2/6/2022  Date of Discharge:  2/12/2022  Discharging Provider: Michael Hernandez MD  Discharge Service: Hospitalist Service    Discharge Diagnoses   Weakness with tremors/asterixis, improving  Right upper lobe aspiration pneumonia with gram-positive bacteremia, improving  Mild aspiration.  Hypokalemia hypomagnesemia, replaced  Acute allergic reaction and skin rash.  Acute urine retention, resolved  Constipation, improving    Follow-ups Needed After Discharge   Follow-up Appointments     Follow-up and recommended labs and tests       Follow up with primary care provider, Mara Murillo, within 7 days for   hospital follow- up.  No follow up labs or test are needed.    Follow up with neurology as scheduled             Unresulted Labs Ordered in the Past 30 Days of this Admission     Date and Time Order Name Status Description    2/12/2022  8:44 AM Blood Culture Hand, Right In process     2/12/2022  8:44 AM Blood Culture Arm, Right In process     2/7/2022 12:50 AM Blood Culture Peripheral Blood Preliminary       These results will be followed up by PCP    Discharge Disposition   Discharged to home with home care  Condition at discharge: Stable    Hospital Course   76 year old female with past medical history of hyperlipidemia, migraine who presents with fever, generalized weakness, she was found to to have pneumonia.  Please refer to H&P for details      Generalized weakness/tremors and asterixis  - Generalized weakness intermittent episodes of slurred speech, reports left side more weak than the right.  -Initially stroke code was called at ED, then the deescalated  -CT and CT angiogram no acute changes or major vessel occlusions, also with no objective focal deficits on exam  - Patient declined MRI, she wants to be \" totally out\" just like when she had her colonoscopy, declined to try even with Ativan.   -Repeated CT head is negative " for significant acute changes   - Neurology consult, appreciate input  - EEG is negative for epilepsy  - Echo shows EF of 70 to 75% with worsening aortic insufficiency  - Continue Keppra for asterixis as recommended by neurology     Right upper lobe aspiration pneumonia  -Present on chest x-ray and CT chest  - Patient also has aspiration on speech evaluation  -WBC 16.3, chest x-ray with right perihilar infiltrate- no productive cough, no fever; procalcitonin wnl  -Started initially on ceftriaxone and azithromycin, incentive spirometry started on admission -> developed rash allover; hold rocephin (h/o PCN allergy) and continue Zithromax  -Started on oral clindamycin on 2/10/2022 as per ID and patient has been tolerating it     Gram-positive bacteremia  - blood cx positive for GPC in cluster; bacteriemia or contamination-> oncall doc started on Vanco-  - repeat chest x-ray shows increased opacity in the right mid/upper lung consistent with right-sided pneumonia  - ID consultation, appreciate input  -Change vancomycin to clindamycin as recommended by ID  - Continue clindamycin on discharge for 10 days course as recommended by ID     Concern for mild aspiration  - Speech therapy fo swallow eval  - Restart regular diet      Hypokalemia and hypomagnesemia  -replace as per protocol   -Continue to monitor level     Acute skin rash  -Patient claims that she is allergic to iv contrast for ct vs to Rocephin (h/o pcn allergy with rash)  - Improving initially but got worse on 2/10/2022 evening after clindamycin,   - I do not see any significant rash changes today  - Patient continued on clindamycin and rash is improving  - continue Benadryl prn     Iron deficiency anemia  -Resume oral iron, monitor Hgb serially     Weakness and deconditioning  - PT/OT evaluation  - Patient would need TCU on discharge but she declined  - Plan for home care on discharge     Acute urine retention  - Abrams cath was placed on admission  - Discontinue  Abrams and patient had successful voiding trial before discharge     Constipation  - Secondary to poor ambulation  - Resolved with laxatives  - Continue bowel regimen       Consultations This Hospital Stay   NEUROLOGY IP CONSULT  PHYSICAL THERAPY ADULT IP CONSULT  OCCUPATIONAL THERAPY ADULT IP CONSULT  SPEECH LANGUAGE PATH ADULT IP CONSULT  SOCIAL WORK IP CONSULT  PHARMACY TO DOSE VANCO  INFECTIOUS DISEASES IP CONSULT  SOCIAL WORK IP CONSULT    Code Status   Full Code    Time Spent on this Encounter   IMichael MD, personally saw the patient today and spent greater than 30 minutes discharging this patient.       Michael Hernandez MD  [address] [address]  Phone: [phone]  Fax: [phone]  ______________________________________________________________________    Physical Exam   Vital Signs: Temp: 98  F (36.7  C) Temp src: Oral BP: 119/57 Pulse: 60   Resp: 18 SpO2: 94 % O2 Device: None (Room air)    Weight: 118 lbs 1.6 oz  Constitutional: awake, alert, cooperative, no apparent distress, and appears stated age  Eyes: Lids and lashes normal, pupils equal, round and reactive to light, extra ocular muscles intact, sclera clear, conjunctiva normal  ENT: Normocephalic, without obvious abnormality, atraumatic, sinuses nontender on palpation, external ears without lesions, oral pharynx with moist mucous membranes, tonsils without erythema or exudates, gums normal and good dentition.  Respiratory: No increased work of breathing, good air exchange, clear to auscultation bilaterally, no crackles or wheezing  Cardiovascular: Normal apical impulse, regular rate and rhythm, normal S1 and S2, no S3 or S4, and no murmur noted  GI: No scars, normal bowel sounds, soft, non-distended, non-tender, no masses palpated, no hepatosplenomegally  Skin: Improving erythematous hives on lower extremities  Musculoskeletal: There is no redness, warmth, or swelling of the  joints.  Full range of motion noted.  no lower extremity pitting edema present  Neurologic: Awake, alert, oriented to name, place and time.  Cranial nerves II-XII are grossly intact.  Motor is 5 out of 5 bilaterally.   Sensory is intact.    Neuropsychiatric: Appropriate with examiner       Primary Care Physician   Mara Murillo    Discharge Orders      Home Care PT Referral for Hospital Discharge      Home Care OT Referral for Hospital Discharge      Reason for your hospital stay    Bacteremia, pneumonia weakness     Follow-up and recommended labs and tests     Follow up with primary care provider, Mara Murillo, within 7 days for hospital follow- up.  No follow up labs or test are needed.    Follow up with neurology as scheduled     Activity    Your activity upon discharge: activity as tolerated     MD face to face encounter    Documentation of Face to Face and Certification for Home Health Services    I certify that patient: Julisa Chaney is under my care and that I, or a nurse practitioner or physician's assistant working with me, had a face-to-face encounter that meets the physician face-to-face encounter requirements with this patient on: 2/12/22.    This encounter with the patient was in whole, or in part, for the following medical condition, which is the primary reason for home health care: 76 year old female with past medical history of hyperlipidemia, migraine who presents with fever, generalized weakness, she was found to to have pneumonia, Generalized weakness/tremors and asterixis.    I certify that, based on my findings, the following services are medically necessary home health services: Occupational Therapy, Physical Therapy, Social Work, and nurse aid .    My clinical findings support the need for the above services because: Occupational Therapy Services are needed to assess and treat cognitive ability and address ADL safety due to impairment in ambulation ., Physical Therapy Services are needed to  assess and treat the following functional impairments: ambulation .    Further, I certify that my clinical findings support that this patient is homebound (i.e. absences from home require considerable and taxing effort and are for medical reasons or [employer] services or infrequently or of short duration when for other reasons) because: Requires assistance of another person or specialized equipment to access medical services because patient: Is unable to operate assistive equipment on their own...    Based on the above findings. I certify that this patient is confined to the home and needs intermittent skilled nursing care, physical therapy and/or speech therapy.  The patient is under my care, and I have initiated the establishment of the plan of care.  This patient will be followed by a physician who will periodically review the plan of care.  Physician/Provider to provide follow up care: Mara Murillo    Attending hospital physician (the Medicare certified [place] provider): Michael Hernandez MD  Physician Signature: See electronic signature associated with these discharge orders.  Date: 2/12/2022     Diet    Follow this diet upon discharge: Orders Placed This Encounter      Combination Diet Easy to Chew (level 7); Thin Liquids (level 0); No Straws       Significant Results and Procedures   Most Recent 3 CBC's:Recent Labs   Lab Test 02/10/22  0752 02/08/22  0743 02/07/22  0011   WBC 4.5 9.2 16.3*   HGB 8.9* 9.1* 10.3*   MCV 80 79 81    391 382     Most Recent 3 BMP's:Recent Labs   Lab Test 02/12/22  0825 02/11/22  0745 02/10/22  0752 02/08/22  1532 02/08/22  0743 02/07/22  0019 02/07/22  0011   NA  --   --  144  --  143  --  139   POTASSIUM 3.8 3.5 3.5   < > 3.1*  --  3.7   CHLORIDE  --   --  111*  --  112*  --  106   CO2  --   --  25  --  22  --  25   BUN  --   --  7*  --  8  --  12   CR  --   --  0.63  --  0.58* 0.6 0.73   ANIONGAP  --   --  8  --  9  --  8   ADY  --   --  8.1*  --  7.8*  --  8.7   GLC  --   --   84  --  94  --  138*    < > = values in this interval not displayed.   ,   Results for orders placed or performed during the hospital encounter of 02/06/22   CTA Head Neck with Contrast    Narrative    EXAM: CTA  HEAD NECK WITH CONTRAST  LOCATION: [facility]  DATE/TIME: 2/7/2022 12:02 AM    INDICATION: Code Stroke to evaluate for potential thrombolysis and thrombectomy.  PLEASE READ IMMEDIATELY. Left-sided weakness and slurred speech.  COMPARISON: Head CT 02/06/2019.  CONTRAST: 75 ml Isovue 370.  TECHNIQUE: Head and neck CT angiogram with IV contrast. Noncontrast head CT followed by axial helical CT images of the head and neck vessels obtained during the arterial phase of intravenous contrast administration. Axial 2D reconstructed images and   multiplanar 3D MIP reconstructed images of the head and neck vessels were performed by the technologist. Dose reduction techniques were used. All stenosis measurements made according to NASCET criteria unless otherwise specified.    FINDINGS:   NONCONTRAST HEAD CT:   INTRACRANIAL CONTENTS: No intracranial hemorrhage, extraaxial collection, or mass effect. No CT evidence of acute infarct. Mild presumed chronic small vessel ischemic changes. Mild generalized volume loss. No hydrocephalus. There are small chronic   infarcts in the bilateral cerebellar hemispheres.     VISUALIZED ORBITS/SINUSES/MASTOIDS: No intraorbital abnormality. No paranasal sinus mucosal disease. No middle ear or mastoid effusion.    BONES/SOFT TISSUES: No acute abnormality.    HEAD CTA:  ANTERIOR CIRCULATION: No major vessel intraluminal filling defect, flow-limiting stenosis or occlusion. Mild to moderate atheromatous changes in the carotid siphons. Within the limits of CTA, no convincing aneurysm or high flow vascular lesion. Standard   [place] anatomy.    POSTERIOR CIRCULATION: No major vessel intraluminal filling defect, flow-limiting stenosis or occlusion. There are  mild scattered atheromatous changes. Dominant right and smaller left vertebral artery contribute to a normal basilar artery.     DURAL VENOUS SINUSES: Expected enhancement of the major dural venous sinuses.    NECK CTA:  RIGHT CAROTID: No measurable stenosis or dissection.    LEFT CAROTID: No measurable stenosis or dissection.    VERTEBRAL ARTERIES: No focal stenosis or dissection. Dominant right and smaller left vertebral arteries.    AORTIC ARCH: Vascular variant aortic arch origin left vertebral artery.  No significant stenosis at the origin of the great vessels.    NONVASCULAR STRUCTURES: Right upper lobe airspace infiltrate. Underlying mild centrilobular emphysema.      Impression    IMPRESSION:   HEAD CT:  1.  No CT evidence of acute intracranial hemorrhage, mass or recent infarct.  2.  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  3.  Mild volume loss and presumed chronic small vessel ischemic changes.    HEAD CTA:   1.  No major vessel acute thromboembolism, flow-limiting stenosis or occlusion.  2.  Evidence of mild intracranial atherosclerosis.    NECK CTA:  1.  Normal neck CTA.  2.  Evidence of a right upper lobe infiltrate. Correlate for pneumonia.  3.  Underlying mild centrilobular emphysema.    Results were called to Dr. Park at 12:39 AM on 02/07/2022.   XR Chest Port 1 View    Narrative    EXAM: XR CHEST PORT 1 VIEW  LOCATION: [facility]  DATE/TIME: 2/7/2022 12:51 AM    INDICATION: Fever.  COMPARISON: 8/8/2020.      Impression    IMPRESSION: Question some mild hazy right perihilar infiltrate with a mild or low-grade pneumonitis not excluded. Left lung is clear. Normal heart size and pulmonary vascularity. Osteopenia. Remainder unremarkable.   XR Video Swallow with SLP or OT    Narrative    EXAM: XR VIDEO SWALLOW WITH SLP OR OT  LOCATION: [facility]  DATE/TIME: 2/7/2022 11:35 AM    INDICATION: Difficulty swallowing.  COMPARISON:  None.    TECHNIQUE: Routine swallow study with speech pathology using multiple barium thicknesses.    FINDINGS:   FLUOROSCOPIC TIME: 1 minutes.  NUMBER OF IMAGES: 0    Swallow study with Speech Pathology using multiple barium thicknesses.     Trace amount of aspiration is seen with thin barium consistency. No other evidence of laryngeal penetration or aspiration is seen with other barium consistencies. Please see dedicated Speech Pathology report for further details of examination.    CT Head w/o Contrast    Narrative    EXAM: CT HEAD W/O CONTRAST  LOCATION: [facility]  DATE/TIME: 2/8/2022 1:22 PM    INDICATION: Left-sided weakness.  COMPARISON: Head and neck CTA 02/07/2022.  TECHNIQUE: Routine CT Head without IV contrast. Multiplanar reformats. Dose reduction techniques were used.    FINDINGS:  INTRACRANIAL CONTENTS: There is no evidence of acute large territorial infarction, acute intracranial hemorrhage, or mass lesion. Chronic infarcts in the bilateral cerebellar hemispheres appear unchanged from the prior study. Mild scattered nonspecific   hypodensity in the cerebral white matter appears stable from the prior study.    Mild prominence of the ventricles, sulci, and cisterns consistent with mild diffuse brain parenchymal volume loss appears stable from the prior study. There is no evidence of hydrocephalus.    VISUALIZED ORBITS/SINUSES/MASTOIDS: No intraorbital abnormality. There is a tiny mucous retention cyst in the right sphenoid sinus. The remainder of the visualized paranasal sinuses are clear. There is trace opacification of the inferior left mastoid air   cells. The right mastoid air cells are clear.     BONES/SOFT TISSUES: No acute abnormality.      Impression    IMPRESSION:  1.  No evidence of acute large territorial infarction, acute intracranial hemorrhage, or mass lesion.  2.  Stable chronic bilateral cerebellar infarcts.  3.  Stable mild chronic small vessel ischemic change  and mild diffuse brain parenchymal volume loss.   XR Chest Port 1 View    Narrative    EXAM: XR CHEST PORT 1 VIEW  LOCATION: [facility]  DATE/TIME: 2022 10:07 AM    INDICATION: follow up possible infiltrates  COMPARISON: 2022.      Impression    IMPRESSION: There is increased airspace opacity in the right mid to upper lung consistent with right-sided pneumonia. Left lung is clear. No pneumothorax or pleural effusion. The heart size and pulmonary vascularity are normal.    NOTE: ABNORMAL REPORT    THE DICTATION ABOVE DESCRIBES AN ABNORMALITY FOR WHICH FOLLOW-UP IS NEEDED.    Echocardiogram Complete    Narrative    [id number]  [id number]  [id number]  [id number]^[address] [address]     Name: CHELITA SAWANT  MRN: [id number]  : 1945  Study Date: 2022 11:22 AM  Age: 76 yrs  Gender: Female  Patient Location: [facility]  Reason For Study: Arterial Embolism and Thrombosis  Ordering Physician: AFRICA KELLY  Performed By:      BSA: 1.6 m2  Height: 66 in  Weight: 115 lb  HR: 77  ______________________________________________________________________________  Procedure  Complete Portable Echo Adult. Complete Portable Bubble Echo Adult.  ______________________________________________________________________________  Interpretation Summary     1. Normal left ventricular size and systolic performance with a visually  estimated ejection fraction of 70-75%.  2. There is mild to moderate aortic insufficiency.  3. There is mild, to perhaps mild-moderate, tricuspid insufficiency.  4. Normal right ventricular size and systolic performance.  5. There is mild left atrial enlargement.  6. Right ventricular systolic pressure relative to right atrial pressure is  mildly increased. The pulmonary artery pressure is estimated to be 40 mmHg  plus right atrial pressure (the IVC is of normal caliber).  7. Echo contrast examination was performed using  agitated NS as contrast  agent. The right heart opacity was good and the left heart was well  visualized. There was no evidence of right to left shunting during spontaneous  respiration or following release of Valsalva.     When compared to the prior real-time echocardiogram dated 10 August 2020,  there has been a slight increase in the degree of aortic insufficiency. There  has been a mild decrease in the pulmonary artery pressure estimate. Otherwise,  the findings are felt to be fairly similar on both examinations.  ______________________________________________________________________________  Left ventricle:  Normal left ventricular size and systolic performance with a visually  estimated ejection fraction of 70-75%. There is normal regional wall motion.  Left ventricular wall thickness is normal.     Assessment of LV Diastolic Function: The cumulative findings suggest impaired  diastolic filling [The septal e' velocity is > 7 cm/s & lateral e' velocity  is  < 10 cm/s. The average E/e' is < 14. TR velocity is > 2.8 m/s. Left atrial  volume index is greater than 34 mL/mÂ ].     Assessment of left atrial pressure (LAP): The cumulative findings suggest  moderately elevated left atrial pressure (the E/A is > 0.8 and <2.0 plus 2 or  3 of 3 of the following present: Average E/e' > 14, TRvel > 2.8 m/s, and/or LA  vol. index > 34 ml/mÂ  ).     Right ventricle:  Normal right ventricular size and systolic performance.     Left atrium:  There is mild left atrial enlargement.     Right atrium:  The right atrium is of normal size.     IVC:  The IVC is of normal caliber.     Aortic valve:  The aortic valve is comprised of three cusps. There is no significant aortic  stenosis. There is mild to moderate aortic insufficiency.     Mitral valve:  The mitral valve appears morphologically normal. There is trace mitral  insufficiency.     Tricuspid valve:  The tricuspid valve is grossly morphologically normal. There is mild,  to  perhaps mild-moderate, tricuspid insufficiency.     Pulmonic valve:  The pulmonic valve is grossly morphologically normal.     Thoracic aorta:  The aortic root and proximal ascending aorta are of normal dimension.     Pericardium:  There is no significant pericardial effusion.  ______________________________________________________________________________  ______________________________________________________________________________  MMode/2D Measurements & Calculations  IVSd: 1.2 cm  LVIDd: 3.8 cm  LVIDs: 2.4 cm  LVPWd: 0.77 cm  FS: 36.5 %  LV mass(C)d: 115.8 grams  LV mass(C)dI: 73.3 grams/m2  Ao root diam: 2.9 cm  LA dimension: 3.1 cm  asc Aorta Diam: 2.8 cm  LA/Ao: 1.1  LVOT diam: 1.8 cm  LVOT area: 2.6 cm2     LA Volume Indexed (AL/bp): 42.0 ml/m2  RWT: 0.41     Doppler Measurements & Calculations  MV E max dave: 118.0 cm/sec  MV A max dave: 136.5 cm/sec  MV E/A: 0.86  MV dec slope: 516.2 cm/sec2  MV dec time: 0.23 sec  Ao V2 max: 188.9 cm/sec  Ao max P.0 mmHg  Ao V2 mean: 119.7 cm/sec  Ao mean P.9 mmHg  Ao V2 VTI: 39.2 cm  MARIE(I,D): 1.9 cm2  MARIE(V,D): 2.0 cm2  AI P1/2t: 671.3 msec  LV V1 max P.8 mmHg  LV V1 max: 148.1 cm/sec  LV V1 VTI: 28.7 cm  SV(LVOT): 74.6 ml  SI(LVOT): 47.2 ml/m2  PA V2 max: 0.48 cm/sec  PA max P.00 mmHg  PA acc time: 0.10 sec  TR max dave: 315.8 cm/sec  TR max P.9 mmHg  AV Dave Ratio (DI): 0.78  MARIE Index (cm2/m2): 1.2  E/E' av.5  Lateral E/e': 13.9     Medial E/e': 11.2     ______________________________________________________________________________  Report approved by: Denny Howe 2022 12:57 PM               Discharge Medications   Discharge Medication List as of 2022 10:41 AM      START taking these medications    Details   aspirin (ASA) 81 MG chewable tablet Take 1 tablet (81 mg) by mouth daily, Disp-90 tablet, R-0, E-Prescribe      clindamycin (CLEOCIN) 300 MG capsule Take 1 capsule (300 mg) by mouth every 6 hours for 9 days,  Disp-36 capsule, R-0, E-Prescribe      cyanocobalamin (VITAMIN B-12) 1000 MCG tablet Take 1 tablet (1,000 mcg) by mouth daily, Disp-90 tablet, R-0, E-Prescribe      levETIRAcetam (KEPPRA) 500 MG tablet Take 1 tablet (500 mg) by mouth 2 times daily, Disp-180 tablet, R-0, E-Prescribe      senna-docusate (SENOKOT-S/PERICOLACE) 8.6-50 MG tablet Take 1 tablet by mouth At Bedtime, OTC         CONTINUE these medications which have NOT CHANGED    Details   acetaminophen-codeine (TYLENOL #3) 300-30 mg per tablet [ACETAMINOPHEN-CODEINE (TYLENOL #3) 300-30 MG PER TABLET] Take 1 tablet by mouth every 6 (six) hours as needed for pain., Disp-120 tablet, R-0, Normal      ARTIFICIAL TEARS 1.4 % ophthalmic solution INSTILL 2 DROPS IN BOTH EYES AS NEEDED, Disp-15 mL, R-0, E-Prescribe      cholecalciferol, vitamin D3, 50 mcg (2,000 unit) Tab [CHOLECALCIFEROL, VITAMIN D3, 50 MCG (2,000 UNIT) TAB] Take 1 tablet (2,000 Units total) by mouth daily. One tab daily, Disp-90 tablet, R-4, Normal      desonide (DESOWEN) 0.05 % cream [DESONIDE (DESOWEN) 0.05 % CREAM] Apply to affected area 2 times dailyDisp-60 g, R-1Normal      ferrous sulfate 325 (65 FE) MG tablet [FERROUS SULFATE 325 (65 FE) MG TABLET] Take 1 tablet (325 mg total) by mouth daily with breakfast., Disp-30 tablet, R-0, Normal      multivitamin (DAILY-DAVID) per tablet [MULTIVITAMIN (DAILY-DAVID) PER TABLET] Take 1 tablet by mouth daily., Disp-100 tablet, R-3, Normal      nortriptyline (PAMELOR) 25 MG capsule TAKE 2 CAPSULES BY MOUTH TWICE DAILY, Disp-360 capsule, R-1, E-Prescribe      omeprazole (PRILOSEC) 40 MG DR capsule TAKE 1 CAPSULE(40 MG) BY MOUTH DAILY BEFORE BREAKFAST, Disp-90 capsule, R-1, E-Prescribe      OXcarbazepine (TRILEPTAL) 150 MG tablet TAKE 3 TABLETS(450 MG) BY MOUTH AT BEDTIME, Disp-270 tablet, R-2, E-Prescribe      topiramate (TOPAMAX) 50 MG tablet TAKE 1 TABLET BY MOUTH EVERY DAY, Disp-90 tablet, R-2, E-Prescribe         STOP taking these medications        "Aspirin-Caffeine 400-32 MG TABS Comments:   Reason for Stopping:             Allergies   Allergies   Allergen Reactions     Contrast [Iohexol] Rash     Noticed during 08/2020 admission. Received IV contrast on 8/5     Chocolate Headache     Mirtazapine Other (See Comments)     \"Needles to the face\"     Penicillins Rash     "

## 2022-02-12 NOTE — PLAN OF CARE
"Problem: Infection (Pneumonia)  Goal: Resolution of Infection Signs and Symptoms  Outcome: Improving   Patient afebrile this shift. LS clear but diminished, O2 sat 94 % on RA.  Continues to be on antibiotics.     -Patient c/o HA rated 9/10. PRN Tylenol PM given and somewhat effective. Patient currently rates pain 7/10 and stated, \"my headache is always at a 6 which is okay\".   -Mag and K WNL.   -Blood culture pending       "

## 2022-02-12 NOTE — PROGRESS NOTES
NEUROLOGY PROGRESS NOTE     Julisa SILVEIRA Ritu,  1945, MRN [id number] Date: 2022     [facility]   Code status:  Full Code   PCP: Mara Murillo, [phone]      ASSESSMENT & PLAN   Diagnosis code: Left arm weakness/asterixis    Left arm weakness  Spells of slurred speech  Complains of generalized weakness with fever  Low B12  Bilateral asterixis      Patient has unilateral asterixis in the left upper extremity which could be secondary to a structural lesion or could be more metabolic.  Epileptic lesions are also a possibility.     Patient today on exam had bilateral asterixis though still worse on the left side compared to the right    Head CT negative for acute stroke    Repeat Head CT negative for acute stroke-do not suspect cerebrovascular disease    On aspirin 81 mg tolerating well.    Will hold off on MRI for right now.    CT angiogram negative for any significant large vessel occlusion stenosis    EEG negative for epilepsy    Electrolytes noncontributory.  Ammonia normal.    Hepatic panel, UA noncontributory    Echocardiogram shows 70-75% ejection fraction.  No PFO.    Lipid panel and statin    Also check B12/TSH--B12 significantly low; recommend replacement    PT/OT    Hold off on antiepileptic medication since asterixis not resolved    Seizure precautions    Possibly asterixis related to B12 deficiency versus ongoing infection    Started on Keppra as a trial.  Will increase dose as tolerated.  Can be stopped down the road.    Getting IV antibiotics-switch to oral now.  Diagnosed with right upper lobe pneumonia    Discharge:-Per primary team.  We will sign off.  Could follow-up as an outpatient with me for further management of her Keppra.  Recommendations given to primary team.       Chief Complaint   Patient presents with     Generalized Weakness     Decreased Appetite     Slurred Speech        HISTORY OF PRESENT ILLNESS     We have been requested by Dr. Ledesma to evaluate Julisa RAOUL Chaney  who is a 76 year old  female for left-sided weakness.  Patient is unable to provide limited history.  She reports she woke up and her arms were shaking she could not grab of anything.  She has not had similar symptoms in the past.  Reports no chest pains palpitations or headaches.  Does not take aspirin at baseline.  No other blood thinners.  Per patient she was having some difficulty finding words as well.  Currently she reports symptoms have improved though continues to have issues with her left arm.  Presentation to the ER and stroke code was called the patient was done in the window for IV thrombolytics.  CT angiogram not show any large vessel occlusion/stenosis.  MRI brain is currently pending even though the patient reports that she had this done this morning.    Per the admission H&P:-Patient does have a history of migraines who presented with generalized weakness.  Has been having progressive weakness for the last 3 days associated with poor appetite and diarrhea which is now resolved.  Patient reports more left upper extremity weakness on yesterday.  Also had episodes of slurred speech and forgetfulness over the last few days.  Noted to be febrile in the ED the patient did not notice that.  Denies any vision changes chest pain shortness of breath palpitations.    2/8  Patient adamant that she will not do the MRI without sedation.  She needs to be completely knocked out.  She is willing to do a head CT today.  EEG also done this morning.  Reports that she is feeling much more alert.  Reports no other new issues.    2/9  Patient reports that she feels better.  Is to have asterixis in both upper extremities.  Denies any other weakness numbness.  Head CT was done yesterday.  Is willing to do an echocardiogram today.    2/10  Continues to have asterixis.  Would like to try other medications.  In the B12 supplementation.  Is willing to try aspirin as well.    2/11  Patient continues to have mild asterixis.  No  "side effects with the Keppra.  Is tolerating the aspirin well.  Remains on B12 supplementation.  Infectious disease following patient.     PAST MEDICAL & SURGICAL HISTORY     Medical History  Past Medical History:   Diagnosis Date     High cholesterol      Migraines      Sepsis (H) 8/10/2020     Surgical History  Past Surgical History:   Procedure Laterality Date     HYSTERECTOMY       [place] ESOPHAGOGASTRODUODENOSCOPY TRANSORAL DIAGNOSTIC N/A 2020    Procedure: ESOPHAGOGASTRODUODENOSCOPY (EGD);  Surgeon: Nathan Smalls MD;  Location: [facility];  Service: Gastroenterology     [place] ESOPHAGOGASTRODUODENOSCOPY TRANSORAL DIAGNOSTIC N/A 2020    Procedure: ESOPHAGOGASTRODUODENOSCOPY (EGD), PYLORIC DILATION;  Surgeon: Nathan Smalls MD;  Location: [facility];  Service: Gastroenterology     [facility] COLONOSCOPY W/WO BRUSH/WASH N/A 2020    Procedure: COLONOSCOPY WITH POLYPECTOMY;  Surgeon: Nathan Smalls MD;  Location: [facility];  Service: Gastroenterology        SOCIAL HISTORY     Social History     Tobacco Use     Smoking status: Former Smoker     Packs/day: 1.00     Years: 50.00     Pack years: 50.00     Quit date: 2014     Years since quittin.2     Smokeless tobacco: Never Used   Substance Use Topics     Alcohol use: No     Drug use: No        FAMILY HISTORY     Reviewed, and family history includes Breast Cancer in her maternal aunt, mother, sister, and sister; Cancer in her father; Testicular cancer (age of onset: 17.00) in her grandchild.     ALLERGIES     Allergies   Allergen Reactions     Contrast [Iohexol] Rash     Noticed during 2020 admission. Received IV contrast on      Chocolate Headache     Mirtazapine Other (See Comments)     \"Needles to the face\"     Penicillins Rash        REVIEW OF SYSTEMS     12 system ROS was noted in the HPI this was negative.  Pertinent positives noted in the HPI.     HOME & HOSPITAL MEDICATIONS     Prior to Admission " Medications  Medications Prior to Admission   Medication Sig Dispense Refill Last Dose     acetaminophen-codeine (TYLENOL #3) 300-30 mg per tablet [ACETAMINOPHEN-CODEINE (TYLENOL #3) 300-30 MG PER TABLET] Take 1 tablet by mouth every 6 (six) hours as needed for pain. 120 tablet 0 Past Week at Unknown time     ARTIFICIAL TEARS 1.4 % ophthalmic solution INSTILL 2 DROPS IN BOTH EYES AS NEEDED (Patient taking differently: Place 2 drops into both eyes every hour as needed ) 15 mL 0 Past Week at Unknown time     Aspirin-Caffeine 400-32 MG TABS Take 1 tablet by mouth daily as needed   Past Week at Unknown time     cholecalciferol, vitamin D3, 50 mcg (2,000 unit) Tab [CHOLECALCIFEROL, VITAMIN D3, 50 MCG (2,000 UNIT) TAB] Take 1 tablet (2,000 Units total) by mouth daily. One tab daily 90 tablet 4 2/6/2022 at Unknown time     desonide (DESOWEN) 0.05 % cream [DESONIDE (DESOWEN) 0.05 % CREAM] Apply to affected area 2 times daily (Patient taking differently: Apply topically 2 times daily as needed ) 60 g 1 Past Month at Unknown time     ferrous sulfate 325 (65 FE) MG tablet [FERROUS SULFATE 325 (65 FE) MG TABLET] Take 1 tablet (325 mg total) by mouth daily with breakfast. 30 tablet 0 Past Month at Unknown time     multivitamin (DAILY-DAVID) per tablet [MULTIVITAMIN (DAILY-DAVID) PER TABLET] Take 1 tablet by mouth daily. 100 tablet 3 Past Week at Unknown time     nortriptyline (PAMELOR) 25 MG capsule TAKE 2 CAPSULES BY MOUTH TWICE DAILY (Patient taking differently: Take 50 mg by mouth 2 times daily ) 360 capsule 1 2/6/2022 at AM     omeprazole (PRILOSEC) 40 MG DR capsule TAKE 1 CAPSULE(40 MG) BY MOUTH DAILY BEFORE BREAKFAST (Patient taking differently: Take 40 mg by mouth daily ) 90 capsule 1 2/6/2022 at AM     OXcarbazepine (TRILEPTAL) 150 MG tablet TAKE 3 TABLETS(450 MG) BY MOUTH AT BEDTIME (Patient taking differently: Take 450 mg by mouth At Bedtime ) 270 tablet 2 2/5/2022 at PM     topiramate (TOPAMAX) 50 MG tablet TAKE 1  "TABLET BY MOUTH EVERY DAY (Patient taking differently: Take 50 mg by mouth At Bedtime ) 90 tablet 2 2/5/2022 at PM       Hospital Medications    aspirin  81 mg Oral Daily     clindamycin  300 mg Oral Q6H MARCIA     cyanocobalamin  1,000 mcg Sublingual Daily     enoxaparin ANTICOAGULANT  40 mg Subcutaneous Q24H     ferrous sulfate  325 mg Oral Daily with breakfast     levETIRAcetam  500 mg Oral BID     LORazepam  1 mg Intravenous Once     nortriptyline  50 mg Oral BID     OXcarbazepine  450 mg Oral At Bedtime     pantoprazole  40 mg Oral QAM AC     polyethylene glycol  17 g Oral Daily     senna-docusate  1 tablet Oral At Bedtime     sodium chloride (PF)  3 mL Intracatheter Q8H     topiramate  50 mg Oral At Bedtime        PHYSICAL EXAM     Vital signs  Temp:  [97.6  F (36.4  C)-98.2  F (36.8  C)] 97.8  F (36.6  C)  Pulse:  [65-76] 65  Resp:  [16-18] 16  BP: (111-136)/(54-63) 118/56  SpO2:  [93 %-97 %] 95 %    PHYSICAL EXAMINATION  VITALS: /56 (BP Location: Right arm)   Pulse 65   Temp 97.8  F (36.6  C) (Oral)   Resp 16   Ht 1.676 m (5' 6\")   Wt 53.6 kg (118 lb 1.6 oz)   SpO2 95%   BMI 19.06 kg/m    GENERAL -Health appearing, No apparent distress  EYES- No scleral icterus, no eyelid droop, Pupils - see Neuro section  HEENT - Normocephalic, atraumatic, Hearing grossly intact; Oral mucosa moist and pink in color. External Ears and nose intact.   Neck - supple with no obvious lymphadenopathy or thyromegaly  PULM - Good spontaneous respiratory effort; Normal palpation of chest.   CV- Pedal pulses present with no peripheral edema/ No significant varicosities.  MSK- Gait - see Neuro section; Strength and tone- see Neuro section; Range of motion grossly intact.  PSYCH -cooperative    Neurological  Mental status - Patient is awake and oriented to self, place and time. Attention span is normal. Language is fluent and follows commands appropriately.   Cranial nerves - CN II-XII intact. Pupils are reactive and " symmetric; EOMI, VFIT, NLF symmetric  Motor - There is no pronator drift. Motor exam shows 5/5 strength in all extremities.  Tone - Tone is symmetric bilaterally in upper and lower extremities.  Mild asterixis in both extremities.  Left worse than right  Coordination - Finger to nose and heel to shin is without dysmetria except in the left arm due to the asterixis  Gait and station --unable to safely ambulate.  Formal gait testing cannot be done due to safety concerns from ongoing medical issues.  Asterixis is present today.  No change today.       DIAGNOSTIC STUDIES     Pertinent Radiology   CTA  HEAD CT:  1.  No CT evidence of acute intracranial hemorrhage, mass or recent infarct.  2.  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  3.  Mild volume loss and presumed chronic small vessel ischemic changes.     HEAD CTA:   1.  No major vessel acute thromboembolism, flow-limiting stenosis or occlusion.  2.  Evidence of mild intracranial atherosclerosis.     NECK CTA:  1.  Normal neck CTA.  2.  Evidence of a right upper lobe infiltrate. Correlate for pneumonia.  3.  Underlying mild centrilobular emphysema.    ECHO 2020  . Normal left ventricular size and systolic performance with a visually estimated ejection fraction of 60-65%.   2. There is mild aortic insufficiency.   3. Normal right ventricular size and systolic performance.  4. No obvious valvular vegetations identified on this study.  5. Right ventricular systolic pressure relative to right atrial pressure is moderately increased.  The pulmonary artery pressure is estimated to be 50 mmHg plus right atrial pressure (the IVC is of fairly normal caliber).     Head CT  IMPRESSION:  1.  No evidence of acute large territorial infarction, acute intracranial hemorrhage, or mass lesion.  2.  Stable chronic bilateral cerebellar infarcts.  3.  Stable mild chronic small vessel ischemic change and mild diffuse brain parenchymal volume loss.    EEG  This is a normal EEG  during wakefulness and drowsiness except for mild generalized background dysrhythmia. EEG is not suggestive of ongoing seizures or encephalopathy. Further clinical correlation is needed.     Please note that the absence of epileptiform abnormalities does not exclude the possibility of epilepsy in any patient.     ECHO  1. Normal left ventricular size and systolic performance with a visually  estimated ejection fraction of 70-75%.  2. There is mild to moderate aortic insufficiency.  3. There is mild, to perhaps mild-moderate, tricuspid insufficiency.  4. Normal right ventricular size and systolic performance.  5. There is mild left atrial enlargement.  6. Right ventricular systolic pressure relative to right atrial pressure is  mildly increased. The pulmonary artery pressure is estimated to be 40 mmHg  plus right atrial pressure (the IVC is of normal caliber).  7. Echo contrast examination was performed using agitated NS as contrast  agent. The right heart opacity was good and the left heart was well  visualized. There was no evidence of right to left shunting during spontaneous  respiration or following release of Valsalva.     When compared to the prior real-time echocardiogram dated 10 August 2020,  there has been a slight increase in the degree of aortic insufficiency. There  has been a mild decrease in the pulmonary artery pressure estimate. Otherwise,  the findings are felt to be fairly similar on both examinations.    Recent Results (from the past 24 hour(s))   Potassium    Collection Time: 02/11/22  7:45 AM   Result Value Ref Range    Potassium 3.5 3.5 - 5.0 mmol/L   Magnesium    Collection Time: 02/11/22  7:45 AM   Result Value Ref Range    Magnesium 1.7 (L) 1.8 - 2.6 mg/dL   Extra Purple Top Tube    Collection Time: 02/11/22  7:45 AM   Result Value Ref Range    Hold Specimen JIC        Total time spent for face to face visit, reviewing labs/imaging studies, counseling and coordination of care was: Over 30  min More than 50% of this time was spent on counseling and coordination of care.    Counseling patient.  Discussion about medications and increasing the dose of Keppra.  Coordination with the nursing staff.  Discussion of plan with the hospitalist.    Jamie Clarke MD  Neurologist  [facility]  Tel:- [phone]

## 2022-02-12 NOTE — PROGRESS NOTES
Infectious Disease Progress Note    Assessment/Plan  Impression: Weakness and balance problems, likely secondary to new stroke.     RUL pnuemonia on CXR and CT     Patient also found to aspirate, so aspiration pneumonia is a possibility.     Single positive blood culture with Gemella and S mitis.  Likely contaminant, but those organisms can be associated with pneumonia.   Rash resolving     Recommendations: changed   antibiotics to clindamycin orally for 10 days.  followup BC    ID recheck Monday    Faiza Chandler M.D.          Subjective  Feel better. Eating some.   Rash better  Less itching    Objective    Vital signs in last 24 hours  Temp:  [97.6  F (36.4  C)-98.7  F (37.1  C)] 98  F (36.7  C)  Pulse:  [60-73] 60  Resp:  [16-18] 18  BP: (111-136)/(54-63) 119/57  SpO2:  [91 %-97 %] 94 %  Wt Readings from Last 3 Encounters:   02/10/22 53.6 kg (118 lb 1.6 oz)   05/04/21 51.4 kg (113 lb 4.8 oz)   08/21/20 49.9 kg (110 lb)       Intake/Output last 3 shifts  I/O last 3 completed shifts:  In: 960 [P.O.:960]  Out: 950 [Urine:950]  Intake/Output this shift:  No intake/output data recorded.    Review of Systems   Pertinent items are noted in HPI., otherwise negative.    Physical Exam  General appearance: alert, appears stated age, cooperative and thin  Head: Normocephalic, without obvious abnormality, atraumatic  Eyes: EOMI, no icterus  Neck: no adenopathy and supple, symmetrical, trachea midline  Heart  No edema  Lungs breathing comfortably  Abdomen: soft, non-tender  Extremities: warm and dry  Skin: minor rash on ext  Neurologic: Grossly normal  Psychiatric: Normal    Pertinent Labs   Lab Results: personally reviewed.     Recent Labs   Lab 02/10/22  0752 02/08/22  0743 02/07/22  0011   WBC 4.5 9.2 16.3*   HGB 8.9* 9.1* 10.3*   HCT 30.6* 30.7* 35.6    391 382        Recent Labs   Lab 02/10/22  0752 02/08/22  0743 02/07/22  0011    143 139   CO2 25 22 25   BUN 7* 8 12     No results found for: CULT    0136  02/10/2022 1009 Blood Culture Peripheral Blood [[id number]]    (Abnormal)   Peripheral Blood    Preliminary result Component Value   Culture Positive on the 1st day of incubation Abnormal  P    Gemella species Panic  P    1 of 2 bottles   Referred to research section for identification.    Streptococcus mitis group Panic  P    1 of 2 bottles                 Collected Updated Procedure Result Status    2022 0139 2022 0310 Symptomatic; Unknown Influenza A/B & SARS-CoV2 (COVID-19) Virus PCR Multiplex Nose [[id number]]    Swab from Nose    Final result Component Value   Influenza A PCR Negative   Influenza B PCR Negative   SARS CoV2 PCR Negative   NEGATIVE: SARS-CoV-2 (COVID-19) RNA not detected, presumed negative.                       2022 0136 2022 0931 Blood Culture Peripheral Blood [[id number]]   Peripheral Blood    Preliminary result Component Value   Culture No growth after 2 days P           2022 0136 2022 0129 Verigene GP Panel [[id number]]    Peripheral Blood    Final result Component Value   Staphylococcus species Not Detected   Staphylococcus aureus Not Detected   Staphylococcus epidermidis Not Detected   Staphylococcus lugdunensis Not Detected   Enterococcus faecalis Not Detected   Enterococcus faecium Not Detected   Streptococcus species Not Detected   Streptococcus agalactiae Not Detected   Streptococcus anginosus group Not Detected   Streptococcus pneumoniae Not Detected   Streptococcus pyogenes Not Detected   Listeria species Not Detected            Pertinent Radiology   Echocardiogram Complete    Result Date: 2022  [id number]^LETICIA^[place] [address]  Name: CHELITA SAWANT MRN: [id number] : 1945 Study Date: 2022 11:22 AM Age: 76 yrs Gender: Female Patient Location: [facility] Reason For Study: Arterial Embolism and Thrombosis Ordering Physician: [place] KELLY Performed By:  AH  BSA: 1.6 m2 Height: 66 in Weight: 115 lb HR: 77 ______________________________________________________________________________ Procedure Complete Portable Echo Adult. Complete Portable Bubble Echo Adult. ______________________________________________________________________________ Interpretation Summary  1. Normal left ventricular size and systolic performance with a visually estimated ejection fraction of 70-75%. 2. There is mild to moderate aortic insufficiency. 3. There is mild, to perhaps mild-moderate, tricuspid insufficiency. 4. Normal right ventricular size and systolic performance. 5. There is mild left atrial enlargement. 6. Right ventricular systolic pressure relative to right atrial pressure is mildly increased. The pulmonary artery pressure is estimated to be 40 mmHg plus right atrial pressure (the IVC is of normal caliber). 7. Echo contrast examination was performed using agitated NS as contrast agent. The right heart opacity was good and the left heart was well visualized. There was no evidence of right to left shunting during spontaneous respiration or following release of Valsalva.  When compared to the prior real-time echocardiogram dated 10 August 2020, there has been a slight increase in the degree of aortic insufficiency. There has been a mild decrease in the pulmonary artery pressure estimate. Otherwise, the findings are felt to be fairly similar on both examinations. ______________________________________________________________________________ Left ventricle: Normal left ventricular size and systolic performance with a visually estimated ejection fraction of 70-75%. There is normal regional wall motion. Left ventricular wall thickness is normal.  Assessment of LV Diastolic Function: The cumulative findings suggest impaired diastolic filling [The septal e' velocity is > 7 cm/s & lateral e' velocity is < 10 cm/s. The average E/e' is < 14. TR velocity is > 2.8 m/s. Left atrial volume index  is greater than 34 mL/mÂ ].  Assessment of left atrial pressure (LAP): The cumulative findings suggest moderately elevated left atrial pressure (the E/A is > 0.8 and <2.0 plus 2 or 3 of 3 of the following present: Average E/e' > 14, TRvel > 2.8 m/s, and/or LA vol. index > 34 ml/mÂ  ).  Right ventricle: Normal right ventricular size and systolic performance.  Left atrium: There is mild left atrial enlargement.  Right atrium: The right atrium is of normal size.  IVC: The IVC is of normal caliber.  Aortic valve: The aortic valve is comprised of three cusps. There is no significant aortic stenosis. There is mild to moderate aortic insufficiency.  Mitral valve: The mitral valve appears morphologically normal. There is trace mitral insufficiency.  Tricuspid valve: The tricuspid valve is grossly morphologically normal. There is mild, to perhaps mild-moderate, tricuspid insufficiency.  Pulmonic valve: The pulmonic valve is grossly morphologically normal.  Thoracic aorta: The aortic root and proximal ascending aorta are of normal dimension.  Pericardium: There is no significant pericardial effusion. ______________________________________________________________________________ ______________________________________________________________________________ MMode/2D Measurements & Calculations IVSd: 1.2 cm LVIDd: 3.8 cm LVIDs: 2.4 cm LVPWd: 0.77 cm FS: 36.5 % LV mass(C)d: 115.8 grams LV mass(C)dI: 73.3 grams/m2 Ao root diam: 2.9 cm LA dimension: 3.1 cm asc Aorta Diam: 2.8 cm LA/Ao: 1.1 LVOT diam: 1.8 cm LVOT area: 2.6 cm2  LA Volume Indexed (AL/bp): 42.0 ml/m2 RWT: 0.41  Doppler Measurements & Calculations MV E max mikal: 118.0 cm/sec MV A max mikal: 136.5 cm/sec MV E/A: 0.86 MV dec slope: 516.2 cm/sec2 MV dec time: 0.23 sec Ao V2 max: 188.9 cm/sec Ao max P.0 mmHg Ao V2 mean: 119.7 cm/sec Ao mean P.9 mmHg Ao V2 VTI: 39.2 cm MARIE(I,D): 1.9 cm2 MARIE(V,D): 2.0 cm2 AI P1/2t: 671.3 msec LV V1 max P.8 mmHg LV V1 max: 148.1  cm/sec LV V1 VTI: 28.7 cm SV(LVOT): 74.6 ml SI(LVOT): 47.2 ml/m2 PA V2 max: 0.48 cm/sec PA max P.00 mmHg PA acc time: 0.10 sec TR max dave: 315.8 cm/sec TR max P.9 mmHg AV Dave Ratio (DI): 0.78 MARIE Index (cm2/m2): 1.2 E/E' av.5 Lateral E/e': 13.9  Medial E/e': 11.2  ______________________________________________________________________________ Report approved by: Denny Howe 2022 12:57 PM       XR Chest Port 1 View    Result Date: 2022  EXAM: XR CHEST PORT 1 VIEW LOCATION: [facility] DATE/TIME: 2022 10:07 AM INDICATION: follow up possible infiltrates COMPARISON: 2022.     IMPRESSION: There is increased airspace opacity in the right mid to upper lung consistent with right-sided pneumonia. Left lung is clear. No pneumothorax or pleural effusion. The heart size and pulmonary vascularity are normal. NOTE: ABNORMAL REPORT THE DICTATION ABOVE DESCRIBES AN ABNORMALITY FOR WHICH FOLLOW-UP IS NEEDED.     XR Chest Port 1 View    Result Date: 2022  EXAM: XR CHEST PORT 1 VIEW LOCATION: [facility] DATE/TIME: 2022 12:51 AM INDICATION: Fever. COMPARISON: 2020.     IMPRESSION: Question some mild hazy right perihilar infiltrate with a mild or low-grade pneumonitis not excluded. Left lung is clear. Normal heart size and pulmonary vascularity. Osteopenia. Remainder unremarkable.    CTA Head Neck with Contrast    Result Date: 2022  EXAM: CTA  HEAD NECK WITH CONTRAST LOCATION: [facility] DATE/TIME: 2022 12:02 AM INDICATION: Code Stroke to evaluate for potential thrombolysis and thrombectomy.  PLEASE READ IMMEDIATELY. Left-sided weakness and slurred speech. COMPARISON: Head CT 2019. CONTRAST: 75 ml Isovue 370. TECHNIQUE: Head and neck CT angiogram with IV contrast. Noncontrast head CT followed by axial helical CT images of the head and neck vessels obtained during the arterial phase of  intravenous contrast administration. Axial 2D reconstructed images and multiplanar 3D MIP reconstructed images of the head and neck vessels were performed by the technologist. Dose reduction techniques were used. All stenosis measurements made according to NASCET criteria unless otherwise specified. FINDINGS: NONCONTRAST HEAD CT: INTRACRANIAL CONTENTS: No intracranial hemorrhage, extraaxial collection, or mass effect. No CT evidence of acute infarct. Mild presumed chronic small vessel ischemic changes. Mild generalized volume loss. No hydrocephalus. There are small chronic infarcts in the bilateral cerebellar hemispheres. VISUALIZED ORBITS/SINUSES/MASTOIDS: No intraorbital abnormality. No paranasal sinus mucosal disease. No middle ear or mastoid effusion. BONES/SOFT TISSUES: No acute abnormality. HEAD CTA: ANTERIOR CIRCULATION: No major vessel intraluminal filling defect, flow-limiting stenosis or occlusion. Mild to moderate atheromatous changes in the carotid siphons. Within the limits of CTA, no convincing aneurysm or high flow vascular lesion. Standard [place] anatomy. POSTERIOR CIRCULATION: No major vessel intraluminal filling defect, flow-limiting stenosis or occlusion. There are mild scattered atheromatous changes. Dominant right and smaller left vertebral artery contribute to a normal basilar artery. DURAL VENOUS SINUSES: Expected enhancement of the major dural venous sinuses. NECK CTA: RIGHT CAROTID: No measurable stenosis or dissection. LEFT CAROTID: No measurable stenosis or dissection. VERTEBRAL ARTERIES: No focal stenosis or dissection. Dominant right and smaller left vertebral arteries. AORTIC ARCH: Vascular variant aortic arch origin left vertebral artery.  No significant stenosis at the origin of the great vessels. NONVASCULAR STRUCTURES: Right upper lobe airspace infiltrate. Underlying mild centrilobular emphysema.     IMPRESSION: HEAD CT: 1.  No CT evidence of acute intracranial hemorrhage,  mass or recent infarct. 2.  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 3.  Mild volume loss and presumed chronic small vessel ischemic changes. HEAD CTA: 1.  No major vessel acute thromboembolism, flow-limiting stenosis or occlusion. 2.  Evidence of mild intracranial atherosclerosis. NECK CTA: 1.  Normal neck CTA. 2.  Evidence of a right upper lobe infiltrate. Correlate for pneumonia. 3.  Underlying mild centrilobular emphysema. Results were called to Dr. Park at 12:39 AM on 02/07/2022.    CT Head w/o Contrast    Result Date: 2/8/2022  EXAM: CT HEAD W/O CONTRAST LOCATION: [facility] DATE/TIME: 2/8/2022 1:22 PM INDICATION: Left-sided weakness. COMPARISON: Head and neck CTA 02/07/2022. TECHNIQUE: Routine CT Head without IV contrast. Multiplanar reformats. Dose reduction techniques were used. FINDINGS: INTRACRANIAL CONTENTS: There is no evidence of acute large territorial infarction, acute intracranial hemorrhage, or mass lesion. Chronic infarcts in the bilateral cerebellar hemispheres appear unchanged from the prior study. Mild scattered nonspecific hypodensity in the cerebral white matter appears stable from the prior study. Mild prominence of the ventricles, sulci, and cisterns consistent with mild diffuse brain parenchymal volume loss appears stable from the prior study. There is no evidence of hydrocephalus. VISUALIZED ORBITS/SINUSES/MASTOIDS: No intraorbital abnormality. There is a tiny mucous retention cyst in the right sphenoid sinus. The remainder of the visualized paranasal sinuses are clear. There is trace opacification of the inferior left mastoid air  cells. The right mastoid air cells are clear. BONES/SOFT TISSUES: No acute abnormality.     IMPRESSION: 1.  No evidence of acute large territorial infarction, acute intracranial hemorrhage, or mass lesion. 2.  Stable chronic bilateral cerebellar infarcts. 3.  Stable mild chronic small vessel ischemic change and mild diffuse  brain parenchymal volume loss.    XR Video Swallow with SLP or OT    Result Date: 2/7/2022  EXAM: XR VIDEO SWALLOW WITH SLP OR OT LOCATION: [facility] DATE/TIME: 2/7/2022 11:35 AM INDICATION: Difficulty swallowing. COMPARISON: None. TECHNIQUE: Routine swallow study with speech pathology using multiple barium thicknesses. FINDINGS: FLUOROSCOPIC TIME: 1 minutes. NUMBER OF IMAGES: 0 Swallow study with Speech Pathology using multiple barium thicknesses. Trace amount of aspiration is seen with thin barium consistency. No other evidence of laryngeal penetration or aspiration is seen with other barium consistencies. Please see dedicated Speech Pathology report for further details of examination.     EEG Awake or Drowsy Routine    Result Date: 2/8/2022  EEG report DATE Feb 8, 2022 CLINICAL HISTORY This is a 76 year old female with asterixis. The EEG is done to look for underlying epilepsy. INTRODUCTION This is a routine EEG performed utilizing standard 10- 20 system of electrode placement with 20 channels of recording including one channel of EKG monitor. The patient was studied in awake and drowsy state. Photic stimulation was done. EEG TIME: 29 min DESCRIPTION OF THE RECORD The EEG background consists of a 9 to 10 Hz posterior dominant rhythm that is symmetric. No convincing epileptiform abnormalities were noted. Normal responses noted to photic stimulation. Mild generalized background dysrhythmia is present. IMPRESSION/REPORT/PLAN This is a normal EEG during wakefulness and drowsiness except for mild generalized background dysrhythmia. EEG is not suggestive of ongoing seizures or encephalopathy. Further clinical correlation is needed. Please note that the absence of epileptiform abnormalities does not exclude the possibility of epilepsy in any patient.

## 2022-02-12 NOTE — PLAN OF CARE
Problem: OT General Care Plan  Goal: Toilet Transfer/Toileting (OT)  Description: Toilet Transfer/Toileting (OT)  Outcome: Improving    Voiding.    Problem: Pain Acute  Goal: Acceptable Pain Control and Functional Ability  Outcome: Improving   Gave tylenol #3 at HS for Headache. Sleeping on reassessment.

## 2022-02-12 NOTE — PLAN OF CARE
Problem: Risk for Delirium  Goal: Improved Sleep  Outcome: Improving   Pt is calm and sleeping comfortably, easy to arouse by voice. Denies pain.     Problem: Infection (Pneumonia)  Goal: Resolution of Infection Signs and Symptoms  Outcome: Improving   Remains afebrile. Given scheduled Cleocin.     Problem: Respiratory Compromise (Pneumonia)  Goal: Effective Oxygenation and Ventilation  Outcome: Improving   Lung sounds clear, diminished. O2 sats 91% room air. Provided with incentive spirometer but refused.

## 2022-02-12 NOTE — PROGRESS NOTES
Care Management Discharge Note    Discharge Date: 02/12/2022  Expected Time of Departure: 1200    Discharge Disposition: Home Care    Discharge Services: None    Discharge DME: None    Discharge Transportation: family or friend will provide    Private pay costs discussed: Not applicable    Education Provided on the Discharge Plan:  Yes  Persons Notified of Discharge Plans: Patient  Patient/Family in Agreement with the Plan: yes    Handoff Referral Completed: Yes    Additional Information:  SW reviewed chart and spoke with interdisciplinary team. Per team, pt is medically cleared for discharge to home with [email or web address] for home PT/OT/HHA/SW.     Pt and SW met to discuss discharge plan. Pt agreeable with discharge to home where she lives with her grandson and new [email or web address] for home care services. SW faxed discharge orders to [email or web address] via [employer]ok. Pt declines any other questions and will contact family to provide transportation home.     Discharge questions and needs addressed. Please notify SW if any needs arise.     Kenisha MCKINLEY LGSW

## 2022-02-13 NOTE — PLAN OF CARE
Physical Therapy Discharge Summary    Reason for therapy discharge:    Discharged to home with home therapy.    Progress towards therapy goal(s). See goals on Care Plan in [facility] electronic health record for goal details.  Goals partially met.  Barriers to achieving goals:   discharge from facility.    Therapy recommendation(s):    Continued therapy is recommended.  Rationale/Recommendations:  to improve mobility and strength. PT does recommend the pt use the FWW and have assist with mobility and steps. .

## 2022-02-13 NOTE — PLAN OF CARE
Occupational Therapy Discharge Summary    Reason for therapy discharge:    Discharged to home with home therapy.    Progress towards therapy goal(s). See goals on Care Plan in [facility] electronic health record for goal details.  Goals partially met.  Barriers to achieving goals:   discharge from facility.    Therapy recommendation(s):    Continued therapy is recommended.  Rationale/Recommendations:  OT recommending further skilled therapy to improve overall strength/endurance.

## 2022-02-14 ENCOUNTER — PATIENT OUTREACH (OUTPATIENT)
Dept: CARE COORDINATION | Facility: CLINIC | Age: 77
End: 2022-02-14
Payer: COMMERCIAL

## 2022-02-14 DIAGNOSIS — Z71.89 OTHER SPECIFIED COUNSELING: ICD-10-CM

## 2022-02-14 NOTE — PROGRESS NOTES
Clinic Care Coordination Contact  [facility]/Voicemail       Clinical Data: Care Coordinator Outreach  Reason for referral: TCM outreach  Outreach attempted x 1.  Left message on patient's voicemail with call back information and requested return call.  Plan:  Care Coordinator will try to reach patient again in 1-2 business days.       Jackie Nunn  Community Health Worker  [facility]  Ph: [phone]

## 2022-02-15 NOTE — PROGRESS NOTES
Clinic Care Coordination Contact  [facility]/Voicemail       Clinical Data: Care Coordinator Outreach  Reason for referral: TCM outreach  Outreach attempted x 2.  Left message on patient's voicemail with call back information and requested return call.  Plan:  Care Coordinator will do no further outreaches at this time.     Jackie Nunn  Community Health Worker  [facility] [place] [facility]  Ph: [phone]

## 2022-02-17 ENCOUNTER — OFFICE VISIT (OUTPATIENT)
Dept: FAMILY MEDICINE | Facility: CLINIC | Age: 77
End: 2022-02-17
Payer: COMMERCIAL

## 2022-02-17 VITALS
SYSTOLIC BLOOD PRESSURE: 112 MMHG | WEIGHT: 119.8 LBS | HEART RATE: 72 BPM | DIASTOLIC BLOOD PRESSURE: 64 MMHG | OXYGEN SATURATION: 97 % | BODY MASS INDEX: 19.34 KG/M2

## 2022-02-17 DIAGNOSIS — F51.04 INSOMNIA, PSYCHOPHYSIOLOGICAL: ICD-10-CM

## 2022-02-17 DIAGNOSIS — Z79.899 ENCOUNTER FOR LONG-TERM (CURRENT) USE OF MEDICATIONS: ICD-10-CM

## 2022-02-17 DIAGNOSIS — G50.0 TRIGEMINAL NEURALGIA: ICD-10-CM

## 2022-02-17 DIAGNOSIS — Z09 HOSPITAL DISCHARGE FOLLOW-UP: Primary | ICD-10-CM

## 2022-02-17 DIAGNOSIS — Z79.899 CONTROLLED SUBSTANCE AGREEMENT SIGNED: ICD-10-CM

## 2022-02-17 DIAGNOSIS — Z12.11 SCREEN FOR COLON CANCER: ICD-10-CM

## 2022-02-17 DIAGNOSIS — F33.1 MODERATE EPISODE OF RECURRENT MAJOR DEPRESSIVE DISORDER (H): ICD-10-CM

## 2022-02-17 DIAGNOSIS — D50.8 IRON DEFICIENCY ANEMIA SECONDARY TO INADEQUATE DIETARY IRON INTAKE: ICD-10-CM

## 2022-02-17 DIAGNOSIS — Z12.31 VISIT FOR SCREENING MAMMOGRAM: ICD-10-CM

## 2022-02-17 DIAGNOSIS — J18.9 PNEUMONIA OF RIGHT UPPER LOBE DUE TO INFECTIOUS ORGANISM: ICD-10-CM

## 2022-02-17 PROBLEM — D50.0 IRON DEFICIENCY ANEMIA DUE TO CHRONIC BLOOD LOSS: Status: ACTIVE | Noted: 2020-08-08

## 2022-02-17 PROBLEM — K25.7: Status: ACTIVE | Noted: 2021-05-04

## 2022-02-17 PROBLEM — R93.89 ABNORMAL CHEST CT: Status: ACTIVE | Noted: 2020-08-16

## 2022-02-17 LAB
BACTERIA BLD CULT: NO GROWTH
BACTERIA BLD CULT: NO GROWTH
CREAT UR-MCNC: 114 MG/DL
ERYTHROCYTE [DISTWIDTH] IN BLOOD BY AUTOMATED COUNT: 17.3 % (ref 10–15)
FOLATE SERPL-MCNC: 10.2 NG/ML
HCT VFR BLD AUTO: 33.3 % (ref 35–47)
HGB BLD-MCNC: 9.3 G/DL (ref 11.7–15.7)
MCH RBC QN AUTO: 23 PG (ref 26.5–33)
MCHC RBC AUTO-ENTMCNC: 27.9 G/DL (ref 31.5–36.5)
MCV RBC AUTO: 82 FL (ref 78–100)
PLATELET # BLD AUTO: 443 10E3/UL (ref 150–450)
RBC # BLD AUTO: 4.05 10E6/UL (ref 3.8–5.2)
WBC # BLD AUTO: 9.6 10E3/UL (ref 4–11)

## 2022-02-17 PROCEDURE — 91301 COVID-19,PF,MODERNA (18+ YRS PRIMARY SERIES .5ML): CPT | Performed by: FAMILY MEDICINE

## 2022-02-17 PROCEDURE — 91305 COVID-19,PF,PFIZER (12+ YRS): CPT | Performed by: FAMILY MEDICINE

## 2022-02-17 PROCEDURE — 99495 TRANSJ CARE MGMT MOD F2F 14D: CPT | Performed by: FAMILY MEDICINE

## 2022-02-17 PROCEDURE — 85027 COMPLETE CBC AUTOMATED: CPT | Performed by: FAMILY MEDICINE

## 2022-02-17 PROCEDURE — 0051A COVID-19,PF,PFIZER (12+ YRS): CPT | Performed by: FAMILY MEDICINE

## 2022-02-17 PROCEDURE — 36415 COLL VENOUS BLD VENIPUNCTURE: CPT | Performed by: FAMILY MEDICINE

## 2022-02-17 PROCEDURE — 80307 DRUG TEST PRSMV CHEM ANLYZR: CPT | Performed by: FAMILY MEDICINE

## 2022-02-17 PROCEDURE — 82746 ASSAY OF FOLIC ACID SERUM: CPT | Performed by: FAMILY MEDICINE

## 2022-02-17 RX ORDER — QUETIAPINE FUMARATE 25 MG/1
25 TABLET, FILM COATED ORAL 2 TIMES DAILY
Qty: 1 TABLET | Refills: 3
Start: 2022-02-17 | End: 2022-02-17 | Stop reason: SINTOL

## 2022-02-17 RX ORDER — MIRTAZAPINE 7.5 MG/1
7.5 TABLET, FILM COATED ORAL AT BEDTIME
Qty: 30 TABLET | Refills: 1 | Status: CANCELLED | OUTPATIENT
Start: 2022-02-17

## 2022-02-17 RX ORDER — QUETIAPINE FUMARATE 25 MG/1
TABLET, FILM COATED ORAL
COMMUNITY
Start: 2021-06-17 | End: 2022-02-17

## 2022-02-17 RX ORDER — NORTRIPTYLINE HYDROCHLORIDE 50 MG/1
50 CAPSULE ORAL 2 TIMES DAILY
Qty: 180 CAPSULE | Refills: 1 | Status: ON HOLD | OUTPATIENT
Start: 2022-02-17 | End: 2022-10-04

## 2022-02-17 RX ORDER — QUETIAPINE FUMARATE 25 MG/1
25 TABLET, FILM COATED ORAL 2 TIMES DAILY
Qty: 90 TABLET | Refills: 3 | Status: SHIPPED | OUTPATIENT
Start: 2022-02-17 | End: 2022-02-17

## 2022-02-17 RX ORDER — VENLAFAXINE HYDROCHLORIDE 37.5 MG/1
37.5 TABLET, EXTENDED RELEASE ORAL DAILY
Qty: 30 TABLET | Refills: 1 | Status: SHIPPED | OUTPATIENT
Start: 2022-02-17 | End: 2022-05-19 | Stop reason: SINTOL

## 2022-02-17 ASSESSMENT — ANXIETY QUESTIONNAIRES
3. WORRYING TOO MUCH ABOUT DIFFERENT THINGS: SEVERAL DAYS
5. BEING SO RESTLESS THAT IT IS HARD TO SIT STILL: SEVERAL DAYS
GAD7 TOTAL SCORE: 5
1. FEELING NERVOUS, ANXIOUS, OR ON EDGE: SEVERAL DAYS
6. BECOMING EASILY ANNOYED OR IRRITABLE: NOT AT ALL
IF YOU CHECKED OFF ANY PROBLEMS ON THIS QUESTIONNAIRE, HOW DIFFICULT HAVE THESE PROBLEMS MADE IT FOR YOU TO DO YOUR WORK, TAKE CARE OF THINGS AT HOME, OR GET ALONG WITH OTHER PEOPLE: SOMEWHAT DIFFICULT
4. TROUBLE RELAXING: SEVERAL DAYS
2. NOT BEING ABLE TO STOP OR CONTROL WORRYING: SEVERAL DAYS
7. FEELING AFRAID AS IF SOMETHING AWFUL MIGHT HAPPEN: NOT AT ALL

## 2022-02-17 ASSESSMENT — PATIENT HEALTH QUESTIONNAIRE - PHQ9: SUM OF ALL RESPONSES TO PHQ QUESTIONS 1-9: 15

## 2022-02-17 NOTE — LETTER
Opioid / Opioid Plus Controlled Substance Agreement    This is an agreement between you and your provider about the safe and appropriate use of controlled substance/opioids prescribed by your care team. Controlled substances are medicines that can cause physical and mental dependence (abuse).    There are strict laws about having and using these medicines. We here at [facility] are committing to working with you in your efforts to get better. To support you in this work, we ll help you schedule regular office appointments for medicine refills. If we must cancel or change your appointment for any reason, we ll make sure you have enough medicine to last until your next appointment.     As a Provider, I will:    Listen carefully to your concerns and treat you with respect.     Recommend a treatment plan that I believe is in your best interest. This plan may involve therapies other than opioid pain medication.     Talk with you often about the possible benefits, and the risk of harm of any medicine that we prescribe for you.     Provide a plan on how to taper (discontinue or go off) using this medicine if the decision is made to stop its use.    As a Patient, I understand that opioid(s):     Are a controlled substance prescribed by my care team to help me function or work and manage my condition(s).     Are strong medicines and can cause serious side effects such as:    Drowsiness, which can seriously affect my driving ability    A lower breathing rate, enough to cause death    Harm to my thinking ability     Depression     Abuse of and addiction to this medicine    Need to be taken exactly as prescribed. Combining opioids with certain medicines or chemicals (such as illegal drugs, sedatives, sleeping pills, and benzodiazepines) can be dangerous or even fatal. If I stop opioids suddenly, I may have severe withdrawal symptoms.    Do not work for all types of pain nor for all patients. If they re not helpful, I may  be asked to stop them.        The risks, benefits and side effects of these medicine(s) were explained to me. I agree that:  1. I will take part in other treatments as advised by my care team. This may be psychiatry or counseling, physical therapy, behavioral therapy, group treatment or a referral to a specialist.     2. I will keep all my appointments. I understand that this is part of the monitoring of opioids. My care team may require an office visit for EVERY opioid/controlled substance refill. If I miss appointments or don t follow instructions, my care team may stop my medicine.    3. I will take my medicines as prescribed. I will not change the dose or schedule unless my care team tells me to. There will be no refills if I run out early.     4. I may be asked to come to the clinic and complete a urine drug test or complete a pill count at any time. If I don t give a urine sample or participate in a pill count, the care team may stop my medicine.    5. I will only receive prescriptions from this clinic for chronic pain. If I am treated by another provider for acute pain issues, I will tell them that I am taking opioid pain medication for chronic pain and that I have a treatment agreement with this provider. I will inform my [facility] care team within one business day if I am given a prescription for any pain medication by another healthcare provider. My [facility] care team can contact other providers and pharmacists about my use of any medicines.    6. It is up to me to make sure that I don t run out of my medicines on weekends or holidays. If my care team is willing to refill my opioid prescription without a visit, I must request refills only during office hours. Refills may take up to 3 business days to process. I will use one pharmacy to fill all my opioid and other controlled substance prescriptions. I will notify the clinic about any changes to my insurance or medication  availability.    7. I am responsible for my prescriptions. If the medicine/prescription is lost, stolen or destroyed, it will not be replaced. I also agree not to share controlled substance medicines with anyone.    8. I am aware I should not use any illegal or recreational drugs. I agree not to drink alcohol unless my care team says I can.       9. If I enroll in the [place] [facility] program, I will tell my care team prior to my next refill.     10. I will tell my care team right away if I become pregnant, have a new medical problem treated outside of my regular clinic, or have a change in my medications.    11. I understand that this medicine can affect my thinking, judgment and reaction time. Alcohol and drugs affect the brain and body, which can affect the safety of my driving. Being under the influence of alcohol or drugs can affect my decision-making, behaviors, personal safety, and the safety of others. Driving while impaired (DWI) can occur if a person is driving, operating, or in physical control of a car, motorcycle, boat, snowmobile, ATV, motorbike, off-road vehicle, or any other motor vehicle ([place] Statute 169A.20). I understand the risk if I choose to drive or operate any vehicle or machinery.    I understand that if I do not follow any of the conditions above, my prescriptions or treatment may be stopped or changed.          Opioids  What You Need to Know    What are opioids?   Opioids are pain medicines that must be prescribed by a doctor. They are also known as narcotics.     Examples are:   1. morphine (MS Contin, Betina)  2. oxycodone (Oxycontin)  3. oxycodone and acetaminophen (Percocet)  4. hydrocodone and acetaminophen (Vicodin, Norco)   5. fentanyl patch (Duragesic)   6. hydromorphone (Dilaudid)   7. methadone  8. codeine (Tylenol #3)     What do opioids do well?   Opioids are best for severe short-term pain such as after a surgery or injury. They may work well for cancer pain. They may  help some people with long-lasting (chronic) pain.     What do opioids NOT do well?   Opioids never get rid of pain entirely, and they don t work well for most patients with chronic pain. Opioids don t reduce swelling, one of the causes of pain.                                    Other ways to manage chronic pain and improve function include:       Treat the health problem that may be causing pain    Anti-inflammation medicines, which reduce swelling and tenderness, such as ibuprofen (Advil, Motrin) or naproxen (Aleve)    Acetaminophen (Tylenol)    Antidepressants and anti-seizure medicines, especially for nerve pain    Topical treatments such as patches or creams    Injections or nerve blocks    Chiropractic or osteopathic treatment    Acupuncture, massage, deep breathing, meditation, visual imagery, aromatherapy    Use heat or ice at the pain site    Physical therapy     Exercise    Stop smoking    Take part in therapy       Risks and side effects     Talk to your doctor before you start or decide to keep taking opioids. Possible side effects include:      Lowering your breathing rate enough to cause death    Overdose, including death, especially if taking higher than prescribed doses    Worse depression symptoms; less pleasure in things you usually enjoy    Feeling tired or sluggish    Slower thoughts or cloudy thinking    Being more sensitive to pain over time; pain is harder to control    Trouble sleeping or restless sleep    Changes in hormone levels (for example, less testosterone)    Changes in sex drive or ability to have sex    Constipation    Unsafe driving    Itching and sweating    Dizziness    Nausea, throwing up and dry mouth    What else should I know about opioids?    Opioids may lead to dependence, tolerance, or addiction.      Dependence means that if you stop or reduce the medicine too quickly, you will have withdrawal symptoms. These include loose poop (diarrhea), jitters, flu-like symptoms,  nervousness and tremors. Dependence is not the same as addiction.                       Tolerance means needing higher doses over time to get the same effect. This may increase the chance of serious side effects.      Addiction is when people improperly use a substance that harms their body, their mind or their relations with others. Use of opiates can cause a relapse of addiction if you have a history of drug or alcohol abuse.      People who have used opioids for a long time may have a lower quality of life, worse depression, higher levels of pain and more visits to doctors.    You can overdose on opioids. Take these steps to lower your risk of overdose:    1. Recognize the signs:  Signs of overdose include decrease or loss of consciousness (blackout), slowed breathing, trouble waking up and blue lips. If someone is worried about overdose, they should call 911.    2. Talk to your doctor about Narcan (naloxone).   If you are at risk for overdose, you may be given a prescription for Narcan. This medicine very quickly reverses the effects of opioids.   If you overdose, a friend or family member can give you Narcan while waiting for the ambulance. They need to know the signs of overdose and how to give Narcan.     3. Don't use alcohol or street drugs.   Taking them with opioids can cause death.    4. Do not take any of these medicines unless your doctor says it s OK. Taking these with opioids can cause death:    Benzodiazepines, such as lorazepam (Ativan), alprazolam (Xanax) or diazepam (Valium)    Muscle relaxers, such as cyclobenzaprine (Flexeril)    Sleeping pills like zolpidem (Ambien)     Other opioids      How to keep you and other people safe while taking opioids:    1. Never share your opioids with others.  Opioid medicines are regulated by the Drug Enforcement Agency ([employer]). Selling or sharing medications is a criminal act.    2. Be sure to store opioids in a secure place, locked up if possible. Young children  can easily swallow them and overdose.    3. When you are traveling with your medicines, keep them in the original bottles. If you use a pill box, be sure you also carry a copy of your medicine list from your clinic or pharmacy.    4. Safe disposal of opioids    Most pharmacies have places to get rid of medicine, called disposal kiosks. Medicine disposal options are also available in every [facility]. Search your county and  medication disposal  to find more options. You can find more details at:  [email or web address]     I agree that my provider, clinic care team, and pharmacy may work with any city, state or federal law enforcement agency that investigates the misuse, sale, or other diversion of my controlled medicine. I will allow my provider to discuss my care with, or share a copy of, this agreement with any other treating provider, pharmacy or emergency room where I receive care.    I have read this agreement and have asked questions about anything I did not understand.    _______________________________________________________  Patient Signature - Julisa Chaney _____________________                   Date     _______________________________________________________  Provider Signature - Mara Murillo MD   _____________________                   Date     _______________________________________________________  Witness Signature (required if provider not present while patient signing)   _____________________                   Date

## 2022-02-17 NOTE — PROGRESS NOTES
Assessment/plan   Julisa Chaney is a 76 year old female who is  establish patient to my practice here with chief complaint of hospital follow up.     Patient presents with:  Hospital F/U: 2/6 - 2/12, [facility], Left sided weakness       Julisa was seen today for hospital f/u.    Diagnoses and all orders for this visit:    Hospital discharge follow-up  Comments:  Complete physical exam and review of hospital course and diagnostics reviewed. Medications were also reviewed and changes made as indicated.  rule out for stroke as at the time of her hospital presentations were arm leg weakness     Encounter for long-term (current) use of medications  Comments:  Long term medications reviewed. Discontinued seraquil due to pt's report that it has not been effective and associated with unpleasent side efects (excessive fatigue).    Screen for colon cancer  Comments:  Discussed and ordered cologuard screening.    Trigeminal neuralgia  No change to current plan. Currently taking Keppra.     Controlled substance agreement signed 2/17/22  Comments:  Discussed and signed at this visit.    Pneumonia of right upper lobe due to infectious organism  Comments:  Improving overall. Bilateral diffuse crackles noted, but in the absence of any other symptoms including and overall improvement clinically, no immediate need for further imaging or antibiotic treatment. Likely residual from her recent pneumonia.    Moderate episode of recurrent major depressive disorder (H)  Comments:  Discussed treatment options. Like to change seroquil to Remeron but patient had pins and needle on the face last time . Will try  effexor  37.5  mg daily, increase risk for serotinin syndrome discuss with patient as patient take Pamlor   Depression action plan discussed and copy was given to patient.    Visit for screening mammogram  Comment: Ordered at this visit due to strong family history of breast CA.    Plan: MA Screening Digital Bilateral             Wt  Readings from Last 4 Encounters:   02/17/22 54.3 kg (119 lb 12.8 oz)   02/10/22 53.6 kg (118 lb 1.6 oz)   05/04/21 51.4 kg (113 lb 4.8 oz)   08/21/20 49.9 kg (110 lb)       Subjective:      HPI: Tino Chaney is a 76 year old female is here for an acute hospitalization follow up. She initally presented to [place] emergency department on 2/7/2022 with complaints of generalized weakness, especially on the left side. Stroke, TIA, seizure and other acute neurological pathologies were ruled out at that time. Tino was found to have aspiration pneumonia for which she was treated with IV antibiotics. She was subsequently discharged on a course of oral clindamycin. While she was hospitalized, Tino was seen by speech therapy, at which time she had a video swallow study. Speech therapy subsequently worked with tino to accomplish techniques to prevent aspiration in the future. Today, Tino reports that besides being tired, she is feeling much better.           Hospital Follow-up Visit:    Hospital/Nursing Home/IP Rehab Facility: [facility]  Date of Admission: 2/7/2022  Date of Discharge: 2/12/2022  Reason(s) for Admission: Left sided weakness and aspiration pneumonia.       Was your hospitalization related to COVID-19? No   Problems taking medications regularly:  None  Medication changes since discharge: None  Problems adhering to non-medication therapy:  None    Summary of hospitalization:  [facility] discharge summary reviewed  Diagnostic Tests/Treatments reviewed.  Follow up needed: Multiple diagnostics reviewed.  Other Healthcare Providers Involved in Patient s Care:         None  Update since discharge: improved. Post Discharge Medication Reconciliation: discharge medications reconciled and changed, per note/orders.  Plan of care communicated with patient              I have personally reviewed the patient's allergies, medications, past medical history, family history,  social history, rooming notes and problem list in detail and updated the patient record as necessary.      Past Medical History:   Diagnosis Date     High cholesterol      Migraines      Sepsis (H) 8/10/2020     Past Surgical History:   Procedure Laterality Date     HYSTERECTOMY       ID ESOPHAGOGASTRODUODENOSCOPY TRANSORAL DIAGNOSTIC N/A 8/13/2020    Procedure: ESOPHAGOGASTRODUODENOSCOPY (EGD);  Surgeon: Nathan Smalls MD;  Location: [facility];  Service: Gastroenterology     ID ESOPHAGOGASTRODUODENOSCOPY TRANSORAL DIAGNOSTIC N/A 8/14/2020    Procedure: ESOPHAGOGASTRODUODENOSCOPY (EGD), PYLORIC DILATION;  Surgeon: Nathan Smalls MD;  Location: [facility];  Service: Gastroenterology     [facility] COLONOSCOPY W/WO BRUSH/WASH N/A 8/13/2020    Procedure: COLONOSCOPY WITH POLYPECTOMY;  Surgeon: Nathan Smalls MD;  Location: [facility];  Service: Gastroenterology     Contrast [iohexol], Chocolate, Mirtazapine, and Penicillins  Current Outpatient Medications   Medication Sig Dispense Refill     acetaminophen-codeine (TYLENOL #3) 300-30 mg per tablet [ACETAMINOPHEN-CODEINE (TYLENOL #3) 300-30 MG PER TABLET] Take 1 tablet by mouth every 6 (six) hours as needed for pain. 120 tablet 0     ARTIFICIAL TEARS 1.4 % ophthalmic solution INSTILL 2 DROPS IN BOTH EYES AS NEEDED (Patient taking differently: Place 2 drops into both eyes every hour as needed ) 15 mL 0     aspirin (ASA) 81 MG chewable tablet Take 1 tablet (81 mg) by mouth daily 90 tablet 0     cholecalciferol, vitamin D3, 50 mcg (2,000 unit) Tab [CHOLECALCIFEROL, VITAMIN D3, 50 MCG (2,000 UNIT) TAB] Take 1 tablet (2,000 Units total) by mouth daily. One tab daily 90 tablet 4     clindamycin (CLEOCIN) 300 MG capsule Take 1 capsule (300 mg) by mouth every 6 hours for 9 days 36 capsule 0     cyanocobalamin (VITAMIN B-12) 1000 MCG tablet Take 1 tablet (1,000 mcg) by mouth daily 90 tablet 0     desonide (DESOWEN) 0.05 % cream [DESONIDE (DESOWEN) 0.05 %  CREAM] Apply to affected area 2 times daily (Patient taking differently: Apply topically 2 times daily as needed ) 60 g 1     ferrous sulfate 325 (65 FE) MG tablet [FERROUS SULFATE 325 (65 FE) MG TABLET] Take 1 tablet (325 mg total) by mouth daily with breakfast. 30 tablet 0     levETIRAcetam (KEPPRA) 500 MG tablet Take 1 tablet (500 mg) by mouth 2 times daily 180 tablet 0     multivitamin (DAILY-DAVID) per tablet [MULTIVITAMIN (DAILY-DAVID) PER TABLET] Take 1 tablet by mouth daily. 100 tablet 3     nortriptyline (PAMELOR) 25 MG capsule TAKE 2 CAPSULES BY MOUTH TWICE DAILY (Patient taking differently: Take 50 mg by mouth 2 times daily ) 360 capsule 1     omeprazole (PRILOSEC) 40 MG DR capsule TAKE 1 CAPSULE(40 MG) BY MOUTH DAILY BEFORE BREAKFAST (Patient taking differently: Take 40 mg by mouth daily ) 90 capsule 1     OXcarbazepine (TRILEPTAL) 150 MG tablet TAKE 3 TABLETS(450 MG) BY MOUTH AT BEDTIME (Patient taking differently: Take 450 mg by mouth At Bedtime ) 270 tablet 2     senna-docusate (SENOKOT-S/PERICOLACE) 8.6-50 MG tablet Take 1 tablet by mouth At Bedtime       topiramate (TOPAMAX) 50 MG tablet TAKE 1 TABLET BY MOUTH EVERY DAY (Patient taking differently: Take 50 mg by mouth At Bedtime ) 90 tablet 2     QUEtiapine (SEROQUEL) 25 MG tablet TAKE 1 TABLET(25 MG) BY MOUTH AT BEDTIME (Patient not taking: Reported on 2/17/2022)       Family History   Problem Relation Age of Onset     Cancer Father      Testicular cancer Grandchild 17.00     Breast Cancer Mother      Breast Cancer Sister      Breast Cancer Maternal Aunt      Breast Cancer Sister        Patient Active Problem List   Diagnosis     Osteopenia     Hyperlipidemia     Migraine headache     Trigeminal neuralgia     Counseling regarding advanced directives and goals of care     Controlled substance agreement signed     Hypercalcemia     Anxiety     Chronic pain syndrome     Iron deficiency anemia due to chronic blood loss     Abnormal chest CT     Mild  major depression (H)     Pyloric ulcer, chronic     Left-sided weakness     Pneumonia of right upper lobe due to infectious organism       Review of Systems   12 point comprehensive review of systems was negative except as noted and HPI     Social History     Social History Narrative    Lives alone in the house, relay in TV dinner  grandkids help with house maintenance  Daughter lives close by.  Mara Murillo MD 7/9/2018 1:49 PM         Objective:     Vitals:    02/17/22 1342   BP: 112/64   Pulse: 72   SpO2: 97%   Weight: 54.3 kg (119 lb 12.8 oz)       Physical Exam:   Physical Exam:  General Appearance:  Appears comfortable, Alert, cooperative, no distress,   Head: Normocephalic, without obvious abnormality, atraumatic  Eyes: PERRL, conjunctiva/corneas clear, EOM's intact, both eyes             Nose: Nares normal, no drainage   Throat: Lips, mucosa, and tongue normal; teeth and gums normal  Neck: Supple, symmetrical, trachea midline, no adenopathy;                      Lungs: Diffuse crackles throughout, respirations unlabored, chest expansion is symmetric, No retractions or accessory muscle use. No tachypnea.  Heart: Regular rate and rhythm, S1 and S2 normal, murmur present, no rubs or gallop  Extremities: Extremities normal, atraumatic, no cyanosis or edema  Pulses: DP pulses are 1-2+ bilat.    Skin: no rashes or lesions       35 minutes spent on the day of encounter doing chart review, history and exam, documentation, and further activities as noted.     This note has been dictated using voice recognition software. Any grammatical or context distortions are unintentional and inherent to the software    Mara Murillo MD

## 2022-02-17 NOTE — LETTER
My Depression Action Plan  Name: Julisa Chaney   Date of Birth 1945  Date: 2/17/2022    My doctor: Mara Murillo   My clinic: [address] [address] [phone]          GREEN    ZONE   Good Control    What it looks like:     Things are going generally well. You have normal ups and downs. You may even feel depressed from time to time, but bad moods usually last less than a day.   What you need to do:  1. Continue to care for yourself (see self care plan)  2. Check your depression survival kit and update it as needed  3. Follow your physician s recommendations including any medication.  4. Do not stop taking medication unless you consult with your physician first.           YELLOW         ZONE Getting Worse    What it looks like:     Depression is starting to interfere with your life.     It may be hard to get out of bed; you may be starting to isolate yourself from others.    Symptoms of depression are starting to last most all day and this has happened for several days.     You may have suicidal thoughts but they are not constant.   What you need to do:     1. Call your care team. Your response to treatment will improve if you keep your care team informed of your progress. Yellow periods are signs an adjustment may need to be made.     2. Continue your self-care.  Just get dressed and ready for the day.  Don't give yourself time to talk yourself out of it.    3. Talk to someone in your support network.    4. Open up your Depression Self-Care Plan/Wellness Kit.           RED    ZONE Medical Alert - Get Help    What it looks like:     Depression is seriously interfering with your life.     You may experience these or other symptoms: You can t get out of bed most days, can t work or engage in other necessary activities, you have trouble taking care of basic hygiene, or basic responsibilities, thoughts of suicide or death that will not  go away, self-injurious behavior.     What you need to do:  1. Call your care team and request a same-day appointment. If they are not available (weekends or after hours) call your local crisis line, emergency room or 911.          Depression Self-Care Plan / Wellness Kit    Many people find that medication and therapy are helpful treatments for managing depression. In addition, making small changes to your everyday life can help to boost your mood and improve your wellbeing. Below are some tips for you to consider. Be sure to talk with your medical provider and/or behavioral health consultant if your symptoms are worsening or not improving.     Sleep   Sleep hygiene  means all of the habits that support good, restful sleep. It includes maintaining a consistent bedtime and wake time, using your bedroom only for sleeping or sex, and keeping the bedroom dark and free of distractions like a computer, smartphone, or television.     Develop a Healthy Routine  Maintain good hygiene. Get out of bed in the morning, make your bed, brush your teeth, take a shower, and get dressed. Don t spend too much time viewing media that makes you feel stressed. Find time to relax each day.    Exercise  Get some form of exercise every day. This will help reduce pain and release endorphins, the  feel good  chemicals in your brain. It can be as simple as just going for a walk or doing some gardening, anything that will get you moving.      Diet  Strive to eat healthy foods, including fruits and vegetables. Drink plenty of water. Avoid excessive sugar, caffeine, alcohol, and other mood-altering substances.     Stay Connected with Others  Stay in touch with friends and family members.    Manage Your Mood  Try deep breathing, massage therapy, biofeedback, or meditation. Take part in fun activities when you can. Try to find something to smile about each day.     Psychotherapy  Be open to working with a therapist if your provider recommends it.      Medication  Be sure to take your medication as prescribed. Most anti-depressants need to be taken every day. It usually takes several weeks for medications to work. Not all medicines work for all people. It is important to follow-up with your provider to make sure you have a treatment plan that is working for you. Do not stop your medication abruptly without first discussing it with your provider.    Crisis Resources   These hotlines are for both adults and children. They and are open 24 hours a day, 7 days a week unless noted otherwise.      National Suicide Prevention Lifeline   4-869-693-TALK (9340)      Crisis Text Line    www.crisistextline.org  Text HOME to 734378 from anywhere in the [place], anytime, about any type of crisis. A live, trained crisis counselor will receive the text and respond quickly.      [place] Lifeline for LGBTQ Youth  A national crisis intervention and suicide lifeline for LGBTQ youth under 25. Provides a safe place to talk without judgement. Call 1-883.790.8576; text START to 248182 or visit www.thetrevorproject.org to talk to a trained counselor.      For [facility] crisis numbers, visit the [facility] website at:  https://[place].gov/dhs/people-we-serve/adults/health-care/mental-health/resources/crisis-contacts.jsp

## 2022-02-17 NOTE — PATIENT INSTRUCTIONS
Stop taking the Seroquel and begin taking the Remeron at bed time. This can help with sleep and depression. It can also help you gain weight. Continue taking the other prescribed medications as ordered.

## 2022-02-17 NOTE — LETTER
Opioid / Opioid Plus Controlled Substance Agreement    This is an agreement between you and your provider about the safe and appropriate use of controlled substance/opioids prescribed by your care team. Controlled substances are medicines that can cause physical and mental dependence (abuse).    There are strict laws about having and using these medicines. We here at [facility] are committing to working with you in your efforts to get better. To support you in this work, we ll help you schedule regular office appointments for medicine refills. If we must cancel or change your appointment for any reason, we ll make sure you have enough medicine to last until your next appointment.     As a Provider, I will:    Listen carefully to your concerns and treat you with respect.     Recommend a treatment plan that I believe is in your best interest. This plan may involve therapies other than opioid pain medication.     Talk with you often about the possible benefits, and the risk of harm of any medicine that we prescribe for you.     Provide a plan on how to taper (discontinue or go off) using this medicine if the decision is made to stop its use.    As a Patient, I understand that opioid(s):     Are a controlled substance prescribed by my care team to help me function or work and manage my condition(s).     Are strong medicines and can cause serious side effects such as:    Drowsiness, which can seriously affect my driving ability    A lower breathing rate, enough to cause death    Harm to my thinking ability     Depression     Abuse of and addiction to this medicine    Need to be taken exactly as prescribed. Combining opioids with certain medicines or chemicals (such as illegal drugs, sedatives, sleeping pills, and benzodiazepines) can be dangerous or even fatal. If I stop opioids suddenly, I may have severe withdrawal symptoms.    Do not work for all types of pain nor for all patients. If they re not helpful, I may  be asked to stop them.        The risks, benefits and side effects of these medicine(s) were explained to me. I agree that:  1. I will take part in other treatments as advised by my care team. This may be psychiatry or counseling, physical therapy, behavioral therapy, group treatment or a referral to a specialist.     2. I will keep all my appointments. I understand that this is part of the monitoring of opioids. My care team may require an office visit for EVERY opioid/controlled substance refill. If I miss appointments or don t follow instructions, my care team may stop my medicine.    3. I will take my medicines as prescribed. I will not change the dose or schedule unless my care team tells me to. There will be no refills if I run out early.     4. I may be asked to come to the clinic and complete a urine drug test or complete a pill count at any time. If I don t give a urine sample or participate in a pill count, the care team may stop my medicine.    5. I will only receive prescriptions from this clinic for chronic pain. If I am treated by another provider for acute pain issues, I will tell them that I am taking opioid pain medication for chronic pain and that I have a treatment agreement with this provider. I will inform my [facility] care team within one business day if I am given a prescription for any pain medication by another healthcare provider. My [facility] care team can contact other providers and pharmacists about my use of any medicines.    6. It is up to me to make sure that I don t run out of my medicines on weekends or holidays. If my care team is willing to refill my opioid prescription without a visit, I must request refills only during office hours. Refills may take up to 3 business days to process. I will use one pharmacy to fill all my opioid and other controlled substance prescriptions. I will notify the clinic about any changes to my insurance or medication  availability.    7. I am responsible for my prescriptions. If the medicine/prescription is lost, stolen or destroyed, it will not be replaced. I also agree not to share controlled substance medicines with anyone.    8. I am aware I should not use any illegal or recreational drugs. I agree not to drink alcohol unless my care team says I can.       9. If I enroll in the [place] [facility] Cannabis program, I will tell my care team prior to my next refill.     10. I will tell my care team right away if I become pregnant, have a new medical problem treated outside of my regular clinic, or have a change in my medications.    11. I understand that this medicine can affect my thinking, judgment and reaction time. Alcohol and drugs affect the brain and body, which can affect the safety of my driving. Being under the influence of alcohol or drugs can affect my decision-making, behaviors, personal safety, and the safety of others. Driving while impaired (DWI) can occur if a person is driving, operating, or in physical control of a car, motorcycle, boat, snowmobile, ATV, motorbike, off-road vehicle, or any other motor vehicle ([place] Statute 169A.20). I understand the risk if I choose to drive or operate any vehicle or machinery.    I understand that if I do not follow any of the conditions above, my prescriptions or treatment may be stopped or changed.          Opioids  What You Need to Know    What are opioids?   Opioids are pain medicines that must be prescribed by a doctor. They are also known as narcotics.     Examples are:   1. morphine (MS Contin, Betnia)  2. oxycodone (Oxycontin)  3. oxycodone and acetaminophen (Percocet)  4. hydrocodone and acetaminophen (Vicodin, Norco)   5. fentanyl patch (Duragesic)   6. hydromorphone (Dilaudid)   7. methadone  8. codeine (Tylenol #3)     What do opioids do well?   Opioids are best for severe short-term pain such as after a surgery or injury. They may work well for cancer pain. They may  help some people with long-lasting (chronic) pain.     What do opioids NOT do well?   Opioids never get rid of pain entirely, and they don t work well for most patients with chronic pain. Opioids don t reduce swelling, one of the causes of pain.                                    Other ways to manage chronic pain and improve function include:       Treat the health problem that may be causing pain    Anti-inflammation medicines, which reduce swelling and tenderness, such as ibuprofen (Advil, Motrin) or naproxen (Aleve)    Acetaminophen (Tylenol)    Antidepressants and anti-seizure medicines, especially for nerve pain    Topical treatments such as patches or creams    Injections or nerve blocks    Chiropractic or osteopathic treatment    Acupuncture, massage, deep breathing, meditation, visual imagery, aromatherapy    Use heat or ice at the pain site    Physical therapy     Exercise    Stop smoking    Take part in therapy       Risks and side effects     Talk to your doctor before you start or decide to keep taking opioids. Possible side effects include:      Lowering your breathing rate enough to cause death    Overdose, including death, especially if taking higher than prescribed doses    Worse depression symptoms; less pleasure in things you usually enjoy    Feeling tired or sluggish    Slower thoughts or cloudy thinking    Being more sensitive to pain over time; pain is harder to control    Trouble sleeping or restless sleep    Changes in hormone levels (for example, less testosterone)    Changes in sex drive or ability to have sex    Constipation    Unsafe driving    Itching and sweating    Dizziness    Nausea, throwing up and dry mouth    What else should I know about opioids?    Opioids may lead to dependence, tolerance, or addiction.      Dependence means that if you stop or reduce the medicine too quickly, you will have withdrawal symptoms. These include loose poop (diarrhea), jitters, flu-like symptoms,  nervousness and tremors. Dependence is not the same as addiction.                       Tolerance means needing higher doses over time to get the same effect. This may increase the chance of serious side effects.      Addiction is when people improperly use a substance that harms their body, their mind or their relations with others. Use of opiates can cause a relapse of addiction if you have a history of drug or alcohol abuse.      People who have used opioids for a long time may have a lower quality of life, worse depression, higher levels of pain and more visits to doctors.    You can overdose on opioids. Take these steps to lower your risk of overdose:    1. Recognize the signs:  Signs of overdose include decrease or loss of consciousness (blackout), slowed breathing, trouble waking up and blue lips. If someone is worried about overdose, they should call 911.    2. Talk to your doctor about Narcan (naloxone).   If you are at risk for overdose, you may be given a prescription for Narcan. This medicine very quickly reverses the effects of opioids.   If you overdose, a friend or family member can give you Narcan while waiting for the ambulance. They need to know the signs of overdose and how to give Narcan.     3. Don't use alcohol or street drugs.   Taking them with opioids can cause death.    4. Do not take any of these medicines unless your doctor says it s OK. Taking these with opioids can cause death:    Benzodiazepines, such as lorazepam (Ativan), alprazolam (Xanax) or diazepam (Valium)    Muscle relaxers, such as cyclobenzaprine (Flexeril)    Sleeping pills like zolpidem (Ambien)     Other opioids      How to keep you and other people safe while taking opioids:    1. Never share your opioids with others.  Opioid medicines are regulated by the Drug Enforcement Agency ([employer]). Selling or sharing medications is a criminal act.    2. Be sure to store opioids in a secure place, locked up if possible. Young children  can easily swallow them and overdose.    3. When you are traveling with your medicines, keep them in the original bottles. If you use a pill box, be sure you also carry a copy of your medicine list from your clinic or pharmacy.    4. Safe disposal of opioids    Most pharmacies have places to get rid of medicine, called disposal kiosks. Medicine disposal options are also available in every [facility]. Search your county and  medication disposal  to find more options. You can find more details at:  [email or web address]-[email or web address]-medications     I agree that my provider, clinic care team, and pharmacy may work with any city, state or federal law enforcement agency that investigates the misuse, sale, or other diversion of my controlled medicine. I will allow my provider to discuss my care with, or share a copy of, this agreement with any other treating provider, pharmacy or emergency room where I receive care.    I have read this agreement and have asked questions about anything I did not understand.    _______________________________________________________  Patient Signature - Julisa Chaney _____________________                   Date     _______________________________________________________  Provider Signature - Mara Murillo MD   _____________________                   Date     _______________________________________________________  Witness Signature (required if provider not present while patient signing)   _____________________                   Date

## 2022-02-21 ENCOUNTER — MYC MEDICAL ADVICE (OUTPATIENT)
Dept: FAMILY MEDICINE | Facility: CLINIC | Age: 77
End: 2022-02-21
Payer: COMMERCIAL

## 2022-02-21 LAB
CODEINE UR CFM-MCNC: >9000 NG/ML
CODEINE/CREAT UR: ABNORMAL
MORPHINE UR CFM-MCNC: >7000 NG/ML
MORPHINE/CREAT UR: ABNORMAL
NORCODEINE UR-MCNC: >7500 NG/ML
NORCODEINE/CREAT UR CFM: ABNORMAL NG/MG{CREAT}

## 2022-03-01 DIAGNOSIS — G50.9 TRIGEMINAL NERVE DISORDER: ICD-10-CM

## 2022-03-03 NOTE — TELEPHONE ENCOUNTER
Routing refill request to provider for review/approval because:  Controlled substance request    Last Written Prescription Date:  5/24/21  Last Fill Quantity: 120,  # refills: 0   Last office visit provider:  2/17/22     Requested Prescriptions   Pending Prescriptions Disp Refills     acetaminophen-codeine (TYLENOL #3) 300-30 MG tablet [Pharmacy Med Name: ACETAMINOPHEN/COD #3 (300/30MG) TAB] 120 tablet      Sig: TAKE 1 TABLET BY MOUTH EVERY 6 HOURS AS NEEDED FOR PAIN       There is no refill protocol information for this order          Leonardo Colbert RN 03/03/22 11:57 AM

## 2022-03-28 DIAGNOSIS — G50.9 TRIGEMINAL NERVE DISORDER: ICD-10-CM

## 2022-03-30 NOTE — TELEPHONE ENCOUNTER
Routing refill request to provider for review/approval because:  Controlled substance    Last Written Prescription Date:  3/3/2022  Last Fill Quantity: 120,  # refills: 0   Last office visit provider:  2/17/2022     Requested Prescriptions   Pending Prescriptions Disp Refills     acetaminophen-codeine (TYLENOL #3) 300-30 MG tablet [Pharmacy Med Name: ACETAMINOPHEN/COD #3 (300/30MG) TAB] 120 tablet      Sig: TAKE 1 TABLET BY MOUTH EVERY 6 HOURS AS NEEDED FOR PAIN       There is no refill protocol information for this order          Justine Sy LPN 03/30/22 3:04 PM

## 2022-04-25 DIAGNOSIS — G50.9 TRIGEMINAL NERVE DISORDER: ICD-10-CM

## 2022-04-25 DIAGNOSIS — G43.719 INTRACTABLE CHRONIC MIGRAINE WITHOUT AURA AND WITHOUT STATUS MIGRAINOSUS: ICD-10-CM

## 2022-04-28 RX ORDER — TOPIRAMATE 50 MG/1
50 TABLET, FILM COATED ORAL AT BEDTIME
Qty: 90 TABLET | Refills: 4 | Status: SHIPPED | OUTPATIENT
Start: 2022-04-28 | End: 2023-04-28

## 2022-04-28 NOTE — TELEPHONE ENCOUNTER
"Routing refill request to provider for review/approval because:  Drug not on the [facility] refill protocol - controlled substance  Labs out of range:  CBC    Acetaminophen-codeine Last Written Prescription Date:  3/31/2022  Last Fill Quantity: 120,  # refills: 0   Last office visit provider:  2/17/2022 Dr. Murillo     Topiramate Last Written Prescription Date:  8/4/2021  Last Fill Quantity: 90,  # refills: 2   Last office visit provider:  2/17/2022 Dr. Murillo       Requested Prescriptions   Pending Prescriptions Disp Refills     topiramate (TOPAMAX) 50 MG tablet [Pharmacy Med Name: TOPIRAMATE 50MG TABLETS] 90 tablet 2     Sig: TAKE 1 TABLET BY MOUTH EVERY DAY       Anti-Seizure Meds Protocol  Failed - 4/27/2022  1:13 PM        Failed - Review Authorizing provider's last note.      Refer to last progress notes: confirm request is for original authorizing provider (cannot be through other providers).          Failed - Normal CBC on file in past 26 months     Recent Labs   Lab Test 02/17/22  1500   WBC 9.6   RBC 4.05   HGB 9.3*   HCT 33.3*                    Passed - Recent (12 mo) or future (30 days) visit within the authorizing provider's specialty     Patient has had an office visit with the authorizing provider or a provider within the authorizing providers department within the previous 12 mos or has a future within next 30 days. See \"Patient Info\" tab in inbasket, or \"Choose Columns\" in Meds & Orders section of the refill encounter.              Passed - Normal serum creatinine on file in past 26 months     Recent Labs   Lab Test 02/10/22  0752   CR 0.63       Ok to refill medication if creatinine is low          Passed - Normal ALT or AST on file in past 26 months     Recent Labs   Lab Test 02/10/22  0752   ALT 11     Recent Labs   Lab Test 02/10/22  0752   AST 14             Passed - Normal platelet count on file in past 26 months     Recent Labs   Lab Test 02/17/22  1500                  Passed - Medication " is active on med list        Passed - No active pregnancy on record        Passed - No positive pregnancy test in last 12 months           acetaminophen-codeine (TYLENOL #3) 300-30 MG tablet [Pharmacy Med Name: ACETAMINOPHEN/COD #3 (300/30MG) TAB] 120 tablet      Sig: TAKE 1 TABLET BY MOUTH EVERY 6 HOURS AS NEEDED FOR PAIN       There is no refill protocol information for this order          Lori Tierney RN 04/28/22 8:52 AM

## 2022-05-19 ENCOUNTER — OFFICE VISIT (OUTPATIENT)
Dept: FAMILY MEDICINE | Facility: CLINIC | Age: 77
End: 2022-05-19
Payer: COMMERCIAL

## 2022-05-19 VITALS
OXYGEN SATURATION: 95 % | HEIGHT: 64 IN | DIASTOLIC BLOOD PRESSURE: 58 MMHG | HEART RATE: 94 BPM | BODY MASS INDEX: 20.66 KG/M2 | SYSTOLIC BLOOD PRESSURE: 104 MMHG | WEIGHT: 121 LBS

## 2022-05-19 DIAGNOSIS — G50.0 TRIGEMINAL NEURALGIA: ICD-10-CM

## 2022-05-19 DIAGNOSIS — D50.8 IRON DEFICIENCY ANEMIA SECONDARY TO INADEQUATE DIETARY IRON INTAKE: ICD-10-CM

## 2022-05-19 DIAGNOSIS — G43.009 MIGRAINE WITHOUT AURA AND WITHOUT STATUS MIGRAINOSUS, NOT INTRACTABLE: ICD-10-CM

## 2022-05-19 DIAGNOSIS — M94.9 DISORDER OF BONE AND CARTILAGE: ICD-10-CM

## 2022-05-19 DIAGNOSIS — Z00.00 ENCOUNTER FOR MEDICARE ANNUAL WELLNESS EXAM: Primary | ICD-10-CM

## 2022-05-19 DIAGNOSIS — E78.00 PURE HYPERCHOLESTEROLEMIA: ICD-10-CM

## 2022-05-19 DIAGNOSIS — K25.7 PYLORIC ULCER, CHRONIC: ICD-10-CM

## 2022-05-19 DIAGNOSIS — R93.89 ABNORMAL CHEST CT: ICD-10-CM

## 2022-05-19 DIAGNOSIS — F32.0 CURRENT MILD EPISODE OF MAJOR DEPRESSIVE DISORDER WITHOUT PRIOR EPISODE (H): ICD-10-CM

## 2022-05-19 DIAGNOSIS — G47.00 PERSISTENT INSOMNIA: ICD-10-CM

## 2022-05-19 DIAGNOSIS — Z78.0 MENOPAUSE PRESENT: ICD-10-CM

## 2022-05-19 DIAGNOSIS — Z13.1 SCREENING FOR DIABETES MELLITUS: ICD-10-CM

## 2022-05-19 DIAGNOSIS — R63.4 WEIGHT LOSS, ABNORMAL: ICD-10-CM

## 2022-05-19 DIAGNOSIS — M89.9 DISORDER OF BONE AND CARTILAGE: ICD-10-CM

## 2022-05-19 DIAGNOSIS — K62.3 RECTAL PROLAPSE: ICD-10-CM

## 2022-05-19 PROBLEM — F41.9 ANXIETY: Status: RESOLVED | Noted: 2020-01-06 | Resolved: 2022-05-19

## 2022-05-19 PROBLEM — R53.1 LEFT-SIDED WEAKNESS: Status: RESOLVED | Noted: 2022-02-07 | Resolved: 2022-05-19

## 2022-05-19 PROBLEM — J18.9 PNEUMONIA OF RIGHT UPPER LOBE DUE TO INFECTIOUS ORGANISM: Status: RESOLVED | Noted: 2022-02-07 | Resolved: 2022-05-19

## 2022-05-19 LAB
ALBUMIN SERPL-MCNC: 3.3 G/DL (ref 3.5–5)
ALP SERPL-CCNC: 133 U/L (ref 45–120)
ALT SERPL W P-5'-P-CCNC: 19 U/L (ref 0–45)
ANION GAP SERPL CALCULATED.3IONS-SCNC: 11 MMOL/L (ref 5–18)
AST SERPL W P-5'-P-CCNC: 21 U/L (ref 0–40)
BILIRUB SERPL-MCNC: 0.2 MG/DL (ref 0–1)
BUN SERPL-MCNC: 21 MG/DL (ref 8–28)
CALCIUM SERPL-MCNC: 9.4 MG/DL (ref 8.5–10.5)
CHLORIDE BLD-SCNC: 100 MMOL/L (ref 98–107)
CHOLEST SERPL-MCNC: 247 MG/DL
CO2 SERPL-SCNC: 26 MMOL/L (ref 22–31)
CREAT SERPL-MCNC: 0.96 MG/DL (ref 0.6–1.1)
FASTING STATUS PATIENT QL REPORTED: ABNORMAL
FASTING STATUS PATIENT QL REPORTED: ABNORMAL
GFR SERPL CREATININE-BSD FRML MDRD: 61 ML/MIN/1.73M2
GLUCOSE BLD-MCNC: 141 MG/DL (ref 70–125)
GLUCOSE BLD-MCNC: 141 MG/DL (ref 70–125)
HDLC SERPL-MCNC: 77 MG/DL
LDLC SERPL CALC-MCNC: 149 MG/DL
POTASSIUM BLD-SCNC: 4.1 MMOL/L (ref 3.5–5)
PROT SERPL-MCNC: 7.1 G/DL (ref 6–8)
SODIUM SERPL-SCNC: 137 MMOL/L (ref 136–145)
TRIGL SERPL-MCNC: 105 MG/DL

## 2022-05-19 PROCEDURE — 99397 PER PM REEVAL EST PAT 65+ YR: CPT | Mod: 25 | Performed by: FAMILY MEDICINE

## 2022-05-19 PROCEDURE — 99214 OFFICE O/P EST MOD 30 MIN: CPT | Mod: 25 | Performed by: FAMILY MEDICINE

## 2022-05-19 PROCEDURE — 36415 COLL VENOUS BLD VENIPUNCTURE: CPT | Performed by: FAMILY MEDICINE

## 2022-05-19 PROCEDURE — 0054A COVID-19,PF,PFIZER (12+ YRS): CPT | Performed by: FAMILY MEDICINE

## 2022-05-19 PROCEDURE — 91305 COVID-19,PF,PFIZER (12+ YRS): CPT | Performed by: FAMILY MEDICINE

## 2022-05-19 PROCEDURE — 80053 COMPREHEN METABOLIC PANEL: CPT | Performed by: FAMILY MEDICINE

## 2022-05-19 PROCEDURE — 80061 LIPID PANEL: CPT | Performed by: FAMILY MEDICINE

## 2022-05-19 RX ORDER — MIRTAZAPINE 15 MG/1
15 TABLET, FILM COATED ORAL AT BEDTIME
Qty: 30 TABLET | Refills: 1 | Status: SHIPPED | OUTPATIENT
Start: 2022-05-19 | End: 2022-12-01

## 2022-05-19 ASSESSMENT — PATIENT HEALTH QUESTIONNAIRE - PHQ9
SUM OF ALL RESPONSES TO PHQ QUESTIONS 1-9: 15
10. IF YOU CHECKED OFF ANY PROBLEMS, HOW DIFFICULT HAVE THESE PROBLEMS MADE IT FOR YOU TO DO YOUR WORK, TAKE CARE OF THINGS AT HOME, OR GET ALONG WITH OTHER PEOPLE: SOMEWHAT DIFFICULT
SUM OF ALL RESPONSES TO PHQ QUESTIONS 1-9: 15

## 2022-05-19 ASSESSMENT — ACTIVITIES OF DAILY LIVING (ADL): CURRENT_FUNCTION: SHOPPING REQUIRES ASSISTANCE

## 2022-05-19 NOTE — PATIENT INSTRUCTIONS
Patient Education   Personalized Prevention Plan  You are due for the preventive services outlined below.  Your care team is available to assist you in scheduling these services.  If you have already completed any of these items, please share that information with your care team to update in your medical record.  Health Maintenance Due   Topic Date Due     Osteoporosis Screening  Never done     ANNUAL REVIEW OF HM ORDERS  Never done     Colorectal Cancer Screening  05/03/2016     LUNG CANCER SCREENING  01/25/2018     COVID-19 Vaccine (2 - Pfizer series) 03/10/2022     FALL RISK ASSESSMENT  05/04/2022     Annual Wellness Visit  05/04/2022        Patient Education   Personalized Prevention Plan  You are due for the preventive services outlined below.  Your care team is available to assist you in scheduling these services.  If you have already completed any of these items, please share that information with your care team to update in your medical record.  Health Maintenance Due   Topic Date Due     Osteoporosis Screening  Never done     Colorectal Cancer Screening  05/03/2016     LUNG CANCER SCREENING  01/25/2018     Your Health Risk Assessment indicates you feel you are not in good health    A healthy lifestyle helps keep the body fit and the mind alert. It helps protect you from disease, helps you fight disease, and helps prevent chronic disease (disease that doesn't go away) from getting worse. This is important as you get older and begin to notice twinges in muscles and joints and a decline in the strength and stamina you once took for granted. A healthy lifestyle includes good healthcare, good nutrition, weight control, recreation, and regular exercise. Avoid harmful substances and do what you can to keep safe. Another part of a healthy lifestyle is stay mentally active and socially involved.    Good healthcare     Have a wellness visit every year.     If you have new symptoms, let us know right away. Don't  wait until the next checkup.     Take medicines exactly as prescribed and keep your medicines in a safe place. Tell us if your medicine causes problems.   Healthy diet and weight control     Eat 3 or 4 small, nutritious, low-fat, high-fiber meals a day. Include a variety of fruits, vegetables, and whole-grain foods.     Make sure you get enough calcium in your diet. Calcium, vitamin D, and exercise help prevent osteoporosis (bone thinning).     If you live alone, try eating with others when you can. That way you get a good meal and have company while you eat it.     Try to keep a healthy weight. If you eat more calories than your body uses for energy, it will be stored as fat and you will gain weight.     Recreation   Recreation is not limited to sports and team events. It includes any activity that provides relaxation, interest, enjoyment, and exercise. Recreation provides an outlet for physical, mental, and social energy. It can give a sense of worth and achievement. It can help you stay healthy.    Mental Exercise and Social Involvement  Mental and emotional health is as important as physical health. Keep in touch with friends and family. Stay as active as possible. Continue to learn and challenge yourself.   Things you can do to stay mentally active are:    Learn something new, like a foreign language or musical instrument.     Play SCRABBLE or do crossword puzzles. If you cannot find people to play these games with you at home, you can play them with others on your computer through the Internet.     Join a games club--anything from card games to chess or checkers or lawn bowling.     Start a new hobby.     Go back to school.     Volunteer.     Read.   Keep up with world events.    Exercise for a Healthier Heart  You may wonder how you can improve the health of your heart. If you re thinking about exercise, you re on the right track. You don t need to become an athlete. But you do need a certain amount of brisk  exercise to help strengthen your heart. If you have been diagnosed with a heart condition, your healthcare provider may advise exercise to help stabilize your condition. To help make exercise a habit, choose safe, fun activities.      Exercise with a friend. When activity is fun, you're more likely to stick with it.   Before you start  Check with your healthcare provider before starting an exercise program. This is especially important if you have not been active for a while. It's also important if you have a long-term (chronic) health problem such as heart disease, diabetes, or obesity. Or if you are at high risk for having these problems.   Why exercise?  Exercising regularly offers many healthy rewards. It can help you do all of the following:     Improve your blood cholesterol level to help prevent further heart trouble    Lower your blood pressure to help prevent a stroke or heart attack    Control diabetes, or reduce your risk of getting this disease    Improve your heart and lung function    Reach and stay at a healthy weight    Make your muscles stronger so you can stay active    Prevent falls and fractures by slowing the loss of bone mass (osteoporosis)    Manage stress better    Reduce your blood pressure    Improve your sense of self and your body image  Exercise tips      Ease into your routine. Set small goals. Then build on them. If you are not sure what your activity level should be, talk with your healthcare provider first before starting an exercise routine.    Exercise on most days. Aim for a total of 150 minutes (2 hours and 30 minutes) or more of moderate-intensity aerobic activity each week. Or 75 minutes (1 hour and 15 minutes) or more of vigorous-intensity aerobic activity each week. Or try for a combination of both. Moderate activity means that you breathe heavier and your heart rate increases but you can still talk. Think about doing 40 minutes of moderate exercise, 3 to 4 times a week. For  best results, activity should last for about 40 minutes to lower blood pressure and cholesterol. It's OK to work up to the 40-minute period over time. Examples of moderate-intensity activity are walking 1 mile in 15 minutes. Or doing 30 to 45 minutes of yard work.    Step up your daily activity level.  Along with your exercise program, try being more active the whole day. Walk instead of drive. Or park further away so that you take more steps each day. Do more household tasks or yard work. You may not be able to meet the advised mount of physical activity. But doing some moderate- or vigorous-intensity aerobic activity can help reduce your risk for heart disease. Your healthcare provider can help you figure out what is best for you.    Choose 1 or more activities you enjoy.  Walking is one of the easiest things you can do. You can also try swimming, riding a bike, dancing, or taking an exercise class.    When to call your healthcare provider  Call your healthcare provider if you have any of these:     Chest pain or feel dizzy or lightheaded    Burning, tightness, pressure, or heaviness in your chest, neck, shoulders, back, or arms    Abnormal shortness of breath    More joint or muscle pain    A very fast or irregular heartbeat (palpitations)  [employer] last reviewed this educational content on 7/1/2019 2000-2021 [employer]. All rights reserved. This information is not intended as a substitute for professional medical care. Always follow your healthcare professional's instructions.          Understanding USDA MyPlate  The USDA has guidelines to help you make healthy food choices. These are called MyPlate. MyPlate shows the food groups that make up healthy meals using the image of a place setting. Before you eat, think about the healthiest choices for what to put on your plate or in your cup or bowl. To learn more about building a healthy plate, visit www.choosemyplate.gov.    The food  groups    Fruits. Any fruit or 100% fruit juice counts as part of the Fruit Group. Fruits may be fresh, canned, frozen, or dried, and may be whole, cut-up, or pureed. Make 1/2 of your plate fruits and vegetables.    Vegetables. Any vegetable or 100% vegetable juice counts as a member of the Vegetable Group. Vegetables may be fresh, frozen, canned, or dried. They can be served raw or cooked and may be whole, cut-up, or mashed. Make 1/2 of your plate fruits and vegetables.    Grains. All foods made from grains are part of the Grains Group. These include wheat, rice, oats, cornmeal, and barley. Grains are often used to make foods such as bread, pasta, oatmeal, cereal, tortillas, and grits. Grains should be no more than 1/4 of your plate. At least half of your grains should be whole grains.    Protein. This group includes meat, poultry, seafood, beans and peas, eggs, processed soy products (such as tofu), nuts (including nut butters), and seeds. Make protein choices no more than 1/4 of your plate. Meat and poultry choices should be lean or low fat.    Dairy. The Dairy Group includes all fluid milk products and foods made from milk that contain calcium, such as yogurt and cheese. (Foods that have little calcium, such as cream, butter, and cream cheese, are not part of this group.) Most dairy choices should be low-fat or fat-free.    Oils. Oils aren't a food group, but they do contain essential nutrients. However it's important to watch your intake of oils. These are fats that are liquid at room temperature. They include canola, corn, olive, soybean, vegetable, and sunflower oil. Foods that are mainly oil include mayonnaise, certain salad dressings, and soft margarines. You likely already get your daily oil allowance from the foods you eat.  Things to limit  Eating healthy also means limiting these things in your diet:       Salt (sodium). Many processed foods have a lot of sodium. To keep sodium intake down, eat fresh  vegetables, meats, poultry, and seafood when possible. Purchase low-sodium, reduced-sodium, or no-salt-added food products at the store. And don't add salt to your meals at home. Instead, season them with herbs and spices such as dill, oregano, cumin, and paprika. Or try adding flavor with lemon or lime zest and juice.    Saturated fat. Saturated fats are most often found in animal products such as beef, pork, and chicken. They are often solid at room temperature, such as butter. To reduce your saturated fat intake, choose leaner cuts of meat and poultry. And try healthier cooking methods such as grilling, broiling, roasting, or baking. For a simple lower-fat swap, use plain nonfat yogurt instead of mayonnaise when making potato salad or macaroni salad.    Added sugars. These are sugars added to foods. They are in foods such as ice cream, candy, soda, fruit drinks, sports drinks, energy drinks, cookies, pastries, jams, and syrups. Cut down on added sugars by sharing sweet treats with a family member or friend. You can also choose fruit for dessert, and drink water or other unsweetened beverages.     [employer] last reviewed this educational content on 6/1/2020 2000-2021 [employer]. All rights reserved. This information is not intended as a substitute for professional medical care. Always follow your healthcare professional's instructions.        Activities of Daily Living    Your Health Risk Assessment indicates you have difficulties with activities of daily living such as housework, bathing, preparing meals, taking medication, etc. Please make a follow up appointment for us to address this issue in more detail.  Your Health Risk Assessment indicates you feel you are not in good emotional health.    Recreation   Recreation is not limited to sports and team events. It includes any activity that provides relaxation, interest, enjoyment, and exercise. Recreation provides an outlet for physical, mental,  "and social energy. It can give a sense of worth and achievement. It can help you stay healthy.    Mental Exercise and Social Involvement  Mental and emotional health is as important as physical health. Keep in touch with friends and family. Stay as active as possible. Continue to learn and challenge yourself.   Things you can do to stay mentally active are:    Learn something new, like a foreign language or musical instrument.     Play SCRABBLE or do crossword puzzles. If you cannot find people to play these games with you at home, you can play them with others on your computer through the Internet.     Join a games club--anything from card games to chess or checkers or lawn bowling.     Start a new hobby.     Go back to school.     Volunteer.     Read.   Keep up with world events.    Depression and Suicide in Older Adults    Nearly 2 million older Americans have some type of depression. Some of them even take their own lives. Yet depression among older adults is often ignored. Learn the warning signs. You may help spare a loved one needless pain. You may also save a life.   What is depression?  Depression is a common and serious illness that affects the way you think and feel. It is not a normal part of aging, nor is it a sign of weakness, a character flaw, or something you can snap out of. Most people with depression need treatment to get better. The most common symptom is a feeling of deep sadness. People who are depressed also may seem tired and listless. And nothing seems to give them pleasure. It s normal to grieve or be sad sometimes. But sadness lessens or passes with time. Depression rarely goes away or improves on its own. A person with clinical depression can't \"snap out of it.\" Other symptoms of depression are:     Sleeping more or less than normal    Eating more or less than normal    Having headaches, stomachaches, or other pains that don t go away    Feeling nervous,  empty,  or worthless    Crying a " great deal    Thinking or talking about suicide or death    Loss of interest in activities previously enjoyed    Social isolation    Feeling confused or forgetful  What causes it?  The causes of depression aren t fully known. But it is thought to result from a complex blend of these factors:     Biochemistry. Certain chemicals in the brain play a role.    Genes. Depression does run in families.    Life stress. Life stresses can also trigger depression in some people. Older adults often face many stressors, such as death of friends or a spouse, health problems, and financial concerns.    Chronic conditions. This includes conditions such as diabetes, heart disease, or cancer. These can cause symptoms of depression. Medicine side effects can cause changes in thoughts and behaviors.  How you can help  Often, depressed people may not want to ask for help. When they do, they may be ignored. Or, they may receive the wrong treatment. You can help by showing parents and older friends love and support. If they seem depressed, don t lecture the person, ignore the symptoms, or discount the symptoms as a  normal  part of aging -which they are not. Get involved, listen, and show interest and support.   Help them understand that depression is a treatable illness. Tell them you can help them find the right treatment. Offer to go to their healthcare provider's appointment with them for support when the symptoms are discussed. With their approval, contact a local mental health center, social service agency, or hospital about services.   You can be an advocate for him or her at healthcare appointments. Many older adults have chronic illnesses that can cause symptoms of depression. Medicine side effects can change thoughts and behaviors. You can help make sure that the healthcare provider looks at all of these factors. He or she should refer your family member or friend to a mental healthcare provider when needed. in some cases,  untreated depression can lead to a misdiagnosis. A person may be diagnosed with a brain disorder such as dementia. If the healthcare provider does not take the issue of depression seriously, help your family member or friend to find another provider.   Don't be afraid to ask  If you think an older person you care about could be suicidal, ask,  Have you thought about suicide?  Most people will tell you the truth. If they say  yes,  they may already have a plan for how and when they will attempt it. Find out as much as you can. The more detailed the plan, and the easier it is to carry out, the more danger the person is in right now. Tell the person you are there for them and do not want them to harm him or herself. Don't wait to get help for the person. Call the person's healthcare provider, local hospital, or emergency services.   To learn more    National Suicide Prevention Lifeline (crisis hotline) 245-031-EQIS (711-034-8686)    National [place] of Mental Vgqwfw070-313-8602tqk.[facility].nih.gov    National [place] on Mental Vgjtztc089-334-4969kum.zeferino.org    Mental Health Baksjeb877-245-0288wct.[facility].org    National Suicide Axhozls141-PARNVZT (806-082-3718)    Call 911  Never leave the person alone. A person who is actively suicidal needs psychiatric care right away. They will need constant supervision. Never leave the person out of sight. Call 911 or the national 24-hour suicide crisis hotline at 563-332-IDHR (752-945-5945). You can also take the person to the closest emergency room.   [employer] last reviewed this educational content on 5/1/2020 2000-2021 [employer]. All rights reserved. This information is not intended as a substitute for professional medical care. Always follow your healthcare professional's instructions.

## 2022-05-19 NOTE — ASSESSMENT & PLAN NOTE
Had MRI of head done feb 2022, for new left side weakness  Following neurologist   IMPRESSION:  1.  No evidence of acute large territorial infarction, acute intracranial hemorrhage, or mass lesion.  2.  Stable chronic bilateral cerebellar infarcts.  3.  Stable mild chronic small vessel ischemic change and mild diffuse brain parenchymal volume loss.

## 2022-05-19 NOTE — PROGRESS NOTES
"SUBJECTIVE:   Julisa Chaney 77 year old  who presents for Preventive Visit.      Patient has been advised of split billing requirements and indicates understanding: Yes   Are you in the first 12 months of your Medicare coverage?  No    Healthy Habits:     In general, how would you rate your overall health?  Fair    Frequency of exercise:  1 day/week    Duration of exercise:  N/A    Do you usually eat at least 4 servings of fruit and vegetables a day, include whole grains    & fiber and avoid regularly eating high fat or \"junk\" foods?  No    Taking medications regularly:  Yes    Medication side effects:  None    Ability to successfully perform activities of daily living:  Shopping requires assistance    Home Safety:  No safety concerns identified    Hearing Impairment:  No hearing concerns    In the past 6 months, have you been bothered by leaking of urine?  No    In general, how would you rate your overall mental or emotional health?  Fair      PHQ-2 Total Score: 5    Additional concerns today:  Yes        PROBLEMS TO ADD ON... See assessment plan but new concern of feeling of rectal pain and pressure all the time also clear discharge feels like something coming out off the rectal area all the time sometimes she has to push it back denies any diarrhea or constipation currently    Mental health: Patient did not like the side effect of Effexor so she stopped it and she feel it was not working well for her patient is on multiple medication from her neurologist for her trigeminal neuralgia/tremors which seems to be working very well    Do you feel safe in your environment? Yes    Have you ever done Advance Care Planning? (For example, a Health Directive, POLST, or a discussion with a medical provider or your loved ones about your wishes): No, advance care planning information given to patient to review.  Advanced care planning was discussed at today's visit.       Fall risk  Fallen 2 or more times in the past year?: " No  Any fall with injury in the past year?: No    Cognitive Screening   1) Repeat 3 items (Leader, Season, Table)    2) Clock draw: NORMAL  3) 3 item recall: Answers for HPI/ROS submitted by the patient on 2022  If you checked off any problems, how difficult have these problems made it for you to do your work, take care of things at home, or get along with other people?: Somewhat difficult  PHQ9 TOTAL SCORE: 15     Recalls 3 objects  Results: NORMAL clock, 1-2 items recalled: COGNITIVE IMPAIRMENT LESS LIKELY    Mini-CogTM Copyright MARIO Hill. Licensed by the author for use in [facility]; reprinted with permission (marla@[facility]). All rights reserved.      Do you have sleep apnea, excessive snoring or daytime drowsiness?: no    Reviewed and updated as needed this visit by clinical staff   Tobacco  Allergies  Meds  Problems  Med Hx  Surg Hx  Fam Hx            Reviewed and updated as needed this visit by Provider   Tobacco  Allergies  Meds  Problems  Med Hx  Surg Hx  Fam Hx           Social History     Tobacco Use     Smoking status: Former Smoker     Packs/day: 1.00     Years: 50.00     Pack years: 50.00     Quit date: 2014     Years since quittin.5     Smokeless tobacco: Never Used   Substance Use Topics     Alcohol use: No         Alcohol Use 2022   Prescreen: >3 drinks/day or >7 drinks/week? No           Current providers sharing in care for this patient include:   Patient Care Team:  Mara Murillo MD as PCP - General  Mara Murillo MD as Assigned PCP    The following health maintenance items are reviewed in Epic and correct as of today:  Health Maintenance Due   Topic Date Due     DEXA  Never done     COLORECTAL CANCER SCREENING  2016     LUNG CANCER SCREENING  2018     MEDICARE ANNUAL WELLNESS VISIT  2022     Lab work is in process  Labs reviewed in EPIC  BP Readings from Last 3 Encounters:   22 104/58   22 112/64   22 119/57    Wt  Readings from Last 3 Encounters:   22 54.9 kg (121 lb)   22 54.3 kg (119 lb 12.8 oz)   02/10/22 53.6 kg (118 lb 1.6 oz)                    Patient Active Problem List   Diagnosis     Osteopenia     Hyperlipidemia     Migraine headache     Trigeminal neuralgia     Counseling regarding advanced directives and goals of care     Controlled substance agreement signed     Hypercalcemia     Chronic pain syndrome     Iron deficiency anemia due to chronic blood loss     Abnormal chest CT     Mild major depression (H)     Pyloric ulcer, chronic     Past Surgical History:   Procedure Laterality Date     HYSTERECTOMY       [place] ESOPHAGOGASTRODUODENOSCOPY TRANSORAL DIAGNOSTIC N/A 2020    Procedure: ESOPHAGOGASTRODUODENOSCOPY (EGD);  Surgeon: Nathan Smalls MD;  Location: [facility];  Service: Gastroenterology     [place] ESOPHAGOGASTRODUODENOSCOPY TRANSORAL DIAGNOSTIC N/A 2020    Procedure: ESOPHAGOGASTRODUODENOSCOPY (EGD), PYLORIC DILATION;  Surgeon: Nathan Smalls MD;  Location: [facility];  Service: Gastroenterology     [facility] COLONOSCOPY W/WO BRUSH/WASH N/A 2020    Procedure: COLONOSCOPY WITH POLYPECTOMY;  Surgeon: Nathan Smalls MD;  Location: [facility];  Service: Gastroenterology       Social History     Tobacco Use     Smoking status: Former Smoker     Packs/day: 1.00     Years: 50.00     Pack years: 50.00     Quit date: 2014     Years since quittin.5     Smokeless tobacco: Never Used   Substance Use Topics     Alcohol use: No     Family History   Problem Relation Age of Onset     Cancer Father      Testicular cancer Grandchild 17.00     Breast Cancer Mother      Breast Cancer Sister      Breast Cancer Maternal Aunt      Breast Cancer Sister            Current Outpatient Medications   Medication Sig Dispense Refill     acetaminophen-codeine (TYLENOL #3) 300-30 MG tablet TAKE 1 TABLET BY MOUTH EVERY 6 HOURS AS NEEDED FOR PAIN 120 tablet 0     ARTIFICIAL TEARS 1.4 %  ophthalmic solution INSTILL 2 DROPS IN BOTH EYES AS NEEDED (Patient taking differently: Place 2 drops into both eyes every hour as needed) 15 mL 0     cholecalciferol, vitamin D3, 50 mcg (2,000 unit) Tab [CHOLECALCIFEROL, VITAMIN D3, 50 MCG (2,000 UNIT) TAB] Take 1 tablet (2,000 Units total) by mouth daily. One tab daily 90 tablet 4     desonide (DESOWEN) 0.05 % cream [DESONIDE (DESOWEN) 0.05 % CREAM] Apply to affected area 2 times daily (Patient taking differently: Apply topically 2 times daily as needed) 60 g 1     ferrous sulfate 325 (65 FE) MG tablet [FERROUS SULFATE 325 (65 FE) MG TABLET] Take 1 tablet (325 mg total) by mouth daily with breakfast. 30 tablet 0     mirtazapine (REMERON) 15 MG tablet Take 1 tablet (15 mg) by mouth At Bedtime 30 tablet 1     multivitamin (DAILY-DAVID) per tablet [MULTIVITAMIN (DAILY-DAVID) PER TABLET] Take 1 tablet by mouth daily. 100 tablet 3     omeprazole (PRILOSEC) 40 MG DR capsule TAKE 1 CAPSULE(40 MG) BY MOUTH DAILY BEFORE BREAKFAST (Patient taking differently: Take 40 mg by mouth daily) 90 capsule 1     OXcarbazepine (TRILEPTAL) 150 MG tablet TAKE 3 TABLETS(450 MG) BY MOUTH AT BEDTIME (Patient taking differently: Take 450 mg by mouth At Bedtime) 270 tablet 2     senna-docusate (SENOKOT-S/PERICOLACE) 8.6-50 MG tablet Take 1 tablet by mouth At Bedtime       topiramate (TOPAMAX) 50 MG tablet Take 1 tablet (50 mg) by mouth At Bedtime 90 tablet 4     levETIRAcetam (KEPPRA) 500 MG tablet Take 1 tablet (500 mg) by mouth 2 times daily 180 tablet 0     nortriptyline (PAMELOR) 50 MG capsule Take 1 capsule (50 mg) by mouth 2 times daily 180 capsule 1     Allergies   Allergen Reactions     Contrast [Iohexol] Rash     Noticed during 08/2020 admission. Received IV contrast on 8/5     Chocolate Headache     Penicillins Rash     Pneumonia Vaccine:For adults 65 years or older who do not have an immunocompromising condition, cerebrospinal fluid leak, or cochlear implant and want to receive  "PPSV23 ONLY: Administer 1 dose of PPSV23. Anyone who received any doses of PPSV23 before age 65 should receive 1 final dose of the vaccine at age 65 or older. Administer this last dose at least 5 years after the prior PPSV23 dose.  Mammogram Screening: Mammogram Screening - Patient over age 75, has elected to discontinue screenings.        Review of Systems  Constitutional, HEENT, cardiovascular, pulmonary, gi and gu systems are negative, except as otherwise noted.    OBJECTIVE:   /58   Pulse 94   Ht 1.626 m (5' 4\")   Wt 54.9 kg (121 lb)   SpO2 95%   BMI 20.77 kg/m     Estimated body mass index is 20.77 kg/m  as calculated from the following:    Height as of this encounter: 1.626 m (5' 4\").    Weight as of this encounter: 54.9 kg (121 lb).  Physical Exam  GENERAL: healthy, alert and no distress  EYES: Eyes grossly normal to inspection, PERRL and conjunctivae and sclerae normal  HENT: ear canals and TM's normal, nose and mouth without ulcers or lesions  NECK: no adenopathy, no asymmetry, masses, or scars and thyroid normal to palpation  RESP: lungs clear to auscultation - no rales, rhonchi or wheezes  CV: regular rate and rhythm, normal S1 S2, no S3 or S4, no murmur, click or rub, no peripheral edema and peripheral pulses strong  MS: no gross musculoskeletal defects noted, no edema  PSYCH: mentation appears normal, affect normal    Diagnostic Test Results:  Labs reviewed in Epic    ASSESSMENT / PLAN:   Julisa was seen today for wellness visit, fatigue, rectal problem and anxiety.    Diagnoses and all orders for this visit:    Encounter for Medicare annual wellness exam    Persistent insomnia  Comments:  trial of remeron for both insomnia and depression, as patient didn't like the side effect of effexor   Orders:  -     mirtazapine (REMERON) 15 MG tablet; Take 1 tablet (15 mg) by mouth At Bedtime    Rectal prolapse  Comments:  hemorroid with rectal prolapse referral to colorectal surgeon for eval and " treatment , patient feel she need more pain med b/c of this problem  Orders:  -     Colorectal Surgery Referral; Future    Trigeminal neuralgia  Comments:  following neurologist stable , currently also on keppra which is per patient report  helping with tremors   Orders:  -     COVID-19,PF,PFIZER (12+ YRS)    Pyloric ulcer, chronic    Osteopenia    Current mild episode of major depressive disorder without prior episode (H)  Comments:  tried effexor , did like the side effect as she feel it made deperession worse   Orders:  -     mirtazapine (REMERON) 15 MG tablet; Take 1 tablet (15 mg) by mouth At Bedtime    Migraine without aura and without status migrainosus, not intractable  Comments:  stable on topamax    Pure hypercholesterolemia  -     Lipid Profile; Future  -     Lipid Profile    Iron deficiency anemia secondary to inadequate dietary iron intake  Comments:  recheck today . continue iron supplement     Abnormal chest CT  Comments:  will order follow up CT  Orders:  -     CT Chest w/o Contrast; Future    Weight loss, abnormal  -     Comprehensive metabolic panel (BMP + Alb, Alk Phos, ALT, AST, Total. Bili, TP); Future  -     Comprehensive metabolic panel (BMP + Alb, Alk Phos, ALT, AST, Total. Bili, TP)    Menopause present  -     DEXA HIP/PELVIS/SPINE - Future; Future    Screening for diabetes mellitus  -     Glucose; Future  -     Glucose    Other orders  -     REVIEW OF HEALTH MAINTENANCE PROTOCOL ORDERS  -     Cancel: CARINE([employer])        Patient has been advised of split billing requirements and indicates understanding: Yes  COUNSELING:  Reviewed preventive health counseling, as reflected in patient instructions       Regular exercise       Healthy diet/nutrition       Vision screening       Hearing screening       Dental care       Bladder control       Fall risk prevention       Osteoporosis prevention/bone health       Colon cancer screening       Advanced Planning     Estimated body mass  "index is 20.77 kg/m  as calculated from the following:    Height as of this encounter: 1.626 m (5' 4\").    Weight as of this encounter: 54.9 kg (121 lb).        She reports that she quit smoking about 7 years ago. She has a 50.00 pack-year smoking history. She has never used smokeless tobacco.      Appropriate preventive services were discussed with this patient, including applicable screening as appropriate for cardiovascular disease, diabetes, osteopenia/osteoporosis, and glaucoma.  As appropriate for age/gender, discussed screening for colorectal cancer, prostate cancer, breast cancer, and cervical cancer. Checklist reviewing preventive services available has been given to the patient.    Reviewed patients plan of care and provided an AVS. The Basic Care Plan (routine screening as documented in Health Maintenance) for Elisabeth meets the Care Plan requirement. This Care Plan has been established and reviewed with the Patient.    Counseling Resources:  ATP IV Guidelines  Pooled Cohorts Equation Calculator  Breast Cancer Risk Calculator  Breast Cancer: Medication to Reduce Risk  FRAX Risk Assessment  ICSI Preventive Guidelines  Dietary Guidelines for Americans, 2010  [employer]'s MyPlate  ASA Prophylaxis  Lung CA Screening    Mara Murillo MD  [facility]    Identified Health Risks:    The patient was provided with suggestions to help her develop a healthy physical lifestyle.  She is at risk for lack of exercise and has been provided with information to increase physical activity for the benefit of her well-being.  The patient was counseled and encouraged to consider modifying their diet and eating habits. She was provided with information on recommended healthy diet options.  The patient reports that she has difficulty with activities of daily living. I have asked that the patient make a follow up appointment in 8 weeks where this issue will be further evaluated and addressed.  The patient " was provided with suggestions to help her develop a healthy emotional lifestyle.  The patient s PHQ-9 score is consistent with moderate depression. She was provided with information regarding depression and was advised to schedule a follow up appointment in 8 weeks to further address this issue.

## 2022-05-24 DIAGNOSIS — H04.123 DRY EYES: ICD-10-CM

## 2022-05-24 DIAGNOSIS — K21.9 GASTROESOPHAGEAL REFLUX DISEASE WITHOUT ESOPHAGITIS: ICD-10-CM

## 2022-05-24 DIAGNOSIS — G50.9 TRIGEMINAL NERVE DISORDER: ICD-10-CM

## 2022-05-25 RX ORDER — OMEPRAZOLE 40 MG/1
40 CAPSULE, DELAYED RELEASE ORAL DAILY
Qty: 90 CAPSULE | Refills: 3 | Status: SHIPPED | OUTPATIENT
Start: 2022-05-25 | End: 2023-09-06

## 2022-05-25 NOTE — TELEPHONE ENCOUNTER
"Routing refill request to provider for review/approval because:  Drug not on the [facility] refill protocol : Acetaminophen with Codeine, and artificial tears.   Tylenol #3:  Last Written Prescription Date: 4/28/2022   Last Fill Quantity: 120,  # refills: 0   Last office visit provider: 5/19/2022 with PCP Dr SARA Murillo   Artificial tears: 8/17/2021 15ml,  0 RF  Requested Prescriptions   Pending Prescriptions Disp Refills     acetaminophen-codeine (TYLENOL #3) 300-30 MG tablet [Pharmacy Med Name: ACETAMINOPHEN/COD #3 (300/30MG) TAB] 120 tablet      Sig: TAKE 1 TABLET BY MOUTH EVERY 6 HOURS AS NEEDED FOR PAIN       There is no refill protocol information for this order        ARTIFICIAL TEARS 1.4 % ophthalmic solution [Pharmacy Med Name: AKWA TEARS OPHTH SOL 15ML] 15 mL 0     Sig: INSTILL 2 DROPS IN BOTH EYES AS NEEDED       There is no refill protocol information for this order      Signed Prescriptions Disp Refills    omeprazole (PRILOSEC) 40 MG DR capsule 90 capsule 3     Sig: Take 1 capsule (40 mg) by mouth daily       PPI Protocol Passed - 5/24/2022  9:10 AM        Passed - Not on Clopidogrel (unless Pantoprazole ordered)        Passed - No diagnosis of osteoporosis on record        Passed - Recent (12 mo) or future (30 days) visit within the authorizing provider's specialty     Patient has had an office visit with the authorizing provider or a provider within the authorizing providers department within the previous 12 mos or has a future within next 30 days. See \"Patient Info\" tab in inbasket, or \"Choose Columns\" in Meds & Orders section of the refill encounter.              Passed - Medication is active on med list        Passed - Patient is age 18 or older        Passed - No active pregnacy on record        Passed - No positive pregnancy test in past 12 months             Livier Brandon RN 05/25/22 6:11 PM    Omeprazole: Last Written Prescription Date:  12/5/2021  Last Fill Quantity: 90,  # refills: 1   Last office visit " provider:  5/19/2022 with PCP Dr SARA Murillo

## 2022-05-26 RX ORDER — POLYVINYL ALCOHOL 14 MG/ML
SOLUTION/ DROPS OPHTHALMIC
Qty: 15 ML | Refills: 0 | Status: ON HOLD | OUTPATIENT
Start: 2022-05-26 | End: 2022-10-04

## 2022-05-26 NOTE — TELEPHONE ENCOUNTER
Last seen for a annual wellness by Chidi on 5/24/22.      Pending Prescriptions:                       Disp   Refills    acetaminophen-codeine (TYLENOL #3) 300-30*120 ta*             Sig: TAKE 1 TABLET BY MOUTH EVERY 6 HOURS AS NEEDED           FOR PAIN    ARTIFICIAL TEARS 1.4 % ophthalmic solutio*15 mL  0            Sig: INSTILL 2 DROPS IN BOTH EYES AS NEEDED    Signed Prescriptions:                        Disp   Refills    omeprazole (PRILOSEC) 40 MG DR capsule     90 cap*3        Sig: Take 1 capsule (40 mg) by mouth daily  Authorizing Provider: BRENDA DHILLON  Ordering User: RAYMOND BROWN    Eye gtts last filled 8/17/21  Tylenol #3 last filled 4/28/22 #120    MIGUEL KING on 5/26/2022 at 1:16 PM

## 2022-05-26 NOTE — TELEPHONE ENCOUNTER
Pt calling regarding this. Would like to get the following medication fill.    ARTIFICIAL TEARS 1.4 % ophthalmic solution    acetaminophen-codeine (TYLENOL #3) 300-30 MG tablet    Please send prescription to [facility] on [address]

## 2022-06-20 DIAGNOSIS — G50.9 TRIGEMINAL NERVE DISORDER: ICD-10-CM

## 2022-06-20 NOTE — TELEPHONE ENCOUNTER
Routing refill request to provider for review/approval because:  Controlled Substance     Last Written Prescription Date:  05/26/2022  Last Fill Quantity: 120,  # refills: 0   Last office visit provider:  5/19/2022     Requested Prescriptions   Pending Prescriptions Disp Refills     acetaminophen-codeine (TYLENOL #3) 300-30 MG tablet [Pharmacy Med Name: ACETAMINOPHEN/COD #3 (300/30MG) TAB] 120 tablet      Sig: TAKE 1 TABLET BY MOUTH EVERY 6 HOURS AS NEEDED FOR PAIN       There is no refill protocol information for this order          Helen Ferro RN 06/20/22 4:01 PM

## 2022-07-01 DIAGNOSIS — R27.8 ASTERIXIS: ICD-10-CM

## 2022-07-01 RX ORDER — LEVETIRACETAM 500 MG/1
500 TABLET ORAL 2 TIMES DAILY
Qty: 180 TABLET | Refills: 0 | Status: ON HOLD | OUTPATIENT
Start: 2022-07-01 | End: 2022-10-04

## 2022-07-01 NOTE — TELEPHONE ENCOUNTER
Pending Prescriptions:                       Disp   Refills    levETIRAcetam (KEPPRA) 500 MG tablet      180 ta*0            Sig: Take 1 tablet (500 mg) by mouth 2 times daily    Last fill 5/13/22  Last visit 5/19/22    Patient only has one dose left of medication

## 2022-07-18 DIAGNOSIS — G50.9 TRIGEMINAL NERVE DISORDER: ICD-10-CM

## 2022-07-18 DIAGNOSIS — G51.8 FACIAL NEURALGIA: ICD-10-CM

## 2022-07-18 NOTE — TELEPHONE ENCOUNTER
Routing refill request to provider for review/approval because:  Drug not on the [facility] refill protocol   Patient reports taking medication differently.    Last Written Prescription Date:  10/6/21  Last Fill Quantity: 270,  # refills: 2   Last office visit provider:  5/19/22     Requested Prescriptions   Pending Prescriptions Disp Refills     OXcarbazepine (TRILEPTAL) 150 MG tablet [Pharmacy Med Name: OXCARBAZEPINE 150MG TABLETS] 270 tablet 2     Sig: TAKE 3 TABLETS(450 MG) BY MOUTH AT BEDTIME       There is no refill protocol information for this order        acetaminophen-codeine (TYLENOL #3) 300-30 MG tablet [Pharmacy Med Name: ACETAMINOPHEN/COD #3 (300/30MG) TAB] 120 tablet      Sig: TAKE 1 TABLET BY MOUTH EVERY 6 HOURS AS NEEDED FOR PAIN       There is no refill protocol information for this order          Leonardo Colbert RN 07/18/22 2:47 PM

## 2022-07-19 ENCOUNTER — HOSPITAL ENCOUNTER (INPATIENT)
Facility: HOSPITAL | Age: 77
LOS: 8 days | Discharge: OTHER INSTITUTION | DRG: 871 | End: 2022-07-27
Attending: EMERGENCY MEDICINE | Admitting: HOSPITALIST
Payer: COMMERCIAL

## 2022-07-19 ENCOUNTER — APPOINTMENT (OUTPATIENT)
Dept: RADIOLOGY | Facility: HOSPITAL | Age: 77
DRG: 871 | End: 2022-07-19
Attending: EMERGENCY MEDICINE
Payer: COMMERCIAL

## 2022-07-19 ENCOUNTER — APPOINTMENT (OUTPATIENT)
Dept: CT IMAGING | Facility: HOSPITAL | Age: 77
DRG: 871 | End: 2022-07-19
Attending: EMERGENCY MEDICINE
Payer: COMMERCIAL

## 2022-07-19 DIAGNOSIS — Z79.899 CONTROLLED SUBSTANCE AGREEMENT SIGNED: ICD-10-CM

## 2022-07-19 DIAGNOSIS — G06.0 PYOGENIC BRAIN ABSCESS: Primary | ICD-10-CM

## 2022-07-19 DIAGNOSIS — G89.4 CHRONIC PAIN SYNDROME: ICD-10-CM

## 2022-07-19 DIAGNOSIS — U07.1 INFECTION DUE TO 2019 NOVEL CORONAVIRUS: ICD-10-CM

## 2022-07-19 DIAGNOSIS — G43.809 OTHER MIGRAINE WITHOUT STATUS MIGRAINOSUS, NOT INTRACTABLE: ICD-10-CM

## 2022-07-19 DIAGNOSIS — K25.7 PYLORIC ULCER, CHRONIC: ICD-10-CM

## 2022-07-19 DIAGNOSIS — E78.00 PURE HYPERCHOLESTEROLEMIA: ICD-10-CM

## 2022-07-19 DIAGNOSIS — E83.52 HYPERCALCEMIA: ICD-10-CM

## 2022-07-19 DIAGNOSIS — G50.0 TRIGEMINAL NEURALGIA: ICD-10-CM

## 2022-07-19 DIAGNOSIS — M89.9 DISORDER OF BONE AND CARTILAGE: ICD-10-CM

## 2022-07-19 DIAGNOSIS — F32.0 CURRENT MILD EPISODE OF MAJOR DEPRESSIVE DISORDER WITHOUT PRIOR EPISODE (H): ICD-10-CM

## 2022-07-19 DIAGNOSIS — R91.8 LUNG INFILTRATE: ICD-10-CM

## 2022-07-19 DIAGNOSIS — A41.9 SEPSIS, DUE TO UNSPECIFIED ORGANISM, UNSPECIFIED WHETHER ACUTE ORGAN DYSFUNCTION PRESENT (H): ICD-10-CM

## 2022-07-19 DIAGNOSIS — N17.9 AKI (ACUTE KIDNEY INJURY) (H): ICD-10-CM

## 2022-07-19 DIAGNOSIS — R93.89 ABNORMAL CHEST CT: ICD-10-CM

## 2022-07-19 DIAGNOSIS — G04.90 ENCEPHALITIS: ICD-10-CM

## 2022-07-19 DIAGNOSIS — Z71.89 COUNSELING REGARDING ADVANCED DIRECTIVES AND GOALS OF CARE: ICD-10-CM

## 2022-07-19 DIAGNOSIS — M94.9 DISORDER OF BONE AND CARTILAGE: ICD-10-CM

## 2022-07-19 LAB
ALBUMIN SERPL-MCNC: 3.6 G/DL (ref 3.5–5)
ALBUMIN UR-MCNC: 20 MG/DL
ALP SERPL-CCNC: 134 U/L (ref 45–120)
ALT SERPL W P-5'-P-CCNC: 31 U/L (ref 0–45)
ANION GAP SERPL CALCULATED.3IONS-SCNC: 14 MMOL/L (ref 5–18)
APPEARANCE UR: CLEAR
AST SERPL W P-5'-P-CCNC: 25 U/L (ref 0–40)
BASE EXCESS BLDV CALC-SCNC: -2 MMOL/L
BILIRUB DIRECT SERPL-MCNC: 0.2 MG/DL
BILIRUB SERPL-MCNC: 0.5 MG/DL (ref 0–1)
BILIRUB UR QL STRIP: NEGATIVE
BUN SERPL-MCNC: 28 MG/DL (ref 8–28)
C REACTIVE PROTEIN LHE: 22.9 MG/DL (ref 0–?)
CALCIUM SERPL-MCNC: 10.1 MG/DL (ref 8.5–10.5)
CHLORIDE BLD-SCNC: 99 MMOL/L (ref 98–107)
CO2 SERPL-SCNC: 23 MMOL/L (ref 22–31)
COLOR UR AUTO: YELLOW
CREAT SERPL-MCNC: 1.11 MG/DL (ref 0.6–1.1)
D DIMER PPP FEU-MCNC: 1.05 UG/ML FEU (ref 0–0.5)
ERYTHROCYTE [DISTWIDTH] IN BLOOD BY AUTOMATED COUNT: 18.9 % (ref 10–15)
ETHANOL SERPL-MCNC: <10 MG/DL
FLUAV RNA SPEC QL NAA+PROBE: NEGATIVE
FLUBV RNA RESP QL NAA+PROBE: NEGATIVE
GFR SERPL CREATININE-BSD FRML MDRD: 51 ML/MIN/1.73M2
GLUCOSE BLD-MCNC: 252 MG/DL (ref 70–125)
GLUCOSE UR STRIP-MCNC: NEGATIVE MG/DL
HBA1C MFR BLD: 5.6 %
HCO3 BLDV-SCNC: 22 MMOL/L (ref 24–30)
HCT VFR BLD AUTO: 40.5 % (ref 35–47)
HGB BLD-MCNC: 11.7 G/DL (ref 11.7–15.7)
HGB UR QL STRIP: ABNORMAL
INR PPP: 1.36 (ref 0.85–1.15)
KETONES UR STRIP-MCNC: NEGATIVE MG/DL
LACTATE SERPL-SCNC: 4 MMOL/L (ref 0.7–2)
LEUKOCYTE ESTERASE UR QL STRIP: NEGATIVE
LIPASE SERPL-CCNC: <9 U/L (ref 0–52)
MAGNESIUM SERPL-MCNC: 2 MG/DL (ref 1.8–2.6)
MCH RBC QN AUTO: 20.7 PG (ref 26.5–33)
MCHC RBC AUTO-ENTMCNC: 28.9 G/DL (ref 31.5–36.5)
MCV RBC AUTO: 72 FL (ref 78–100)
MUCOUS THREADS #/AREA URNS LPF: PRESENT /LPF
NITRATE UR QL: NEGATIVE
OXYHGB MFR BLDV: 49.1 % (ref 70–75)
PCO2 BLDV: 34 MM HG (ref 35–50)
PH BLDV: 7.43 [PH] (ref 7.35–7.45)
PH UR STRIP: 5.5 [PH] (ref 5–7)
PHOSPHATE SERPL-MCNC: 2.8 MG/DL (ref 2.5–4.5)
PLATELET # BLD AUTO: 647 10E3/UL (ref 150–450)
PO2 BLDV: 28 MM HG (ref 25–47)
POTASSIUM BLD-SCNC: 3.4 MMOL/L (ref 3.5–5)
PROT SERPL-MCNC: 8.9 G/DL (ref 6–8)
RBC # BLD AUTO: 5.65 10E6/UL (ref 3.8–5.2)
RBC URINE: 2 /HPF
SAO2 % BLDV: 49.8 % (ref 70–75)
SARS-COV-2 RNA RESP QL NAA+PROBE: POSITIVE
SODIUM SERPL-SCNC: 136 MMOL/L (ref 136–145)
SP GR UR STRIP: 1.02 (ref 1–1.03)
TROPONIN I SERPL-MCNC: 0.03 NG/ML (ref 0–0.29)
TSH SERPL DL<=0.005 MIU/L-ACNC: 2.44 UIU/ML (ref 0.3–5)
UROBILINOGEN UR STRIP-MCNC: <2 MG/DL
WBC # BLD AUTO: 32.5 10E3/UL (ref 4–11)
WBC URINE: <1 /HPF

## 2022-07-19 PROCEDURE — 96361 HYDRATE IV INFUSION ADD-ON: CPT

## 2022-07-19 PROCEDURE — 83036 HEMOGLOBIN GLYCOSYLATED A1C: CPT | Performed by: HOSPITALIST

## 2022-07-19 PROCEDURE — 36415 COLL VENOUS BLD VENIPUNCTURE: CPT | Performed by: HOSPITALIST

## 2022-07-19 PROCEDURE — 83735 ASSAY OF MAGNESIUM: CPT | Performed by: EMERGENCY MEDICINE

## 2022-07-19 PROCEDURE — 99223 1ST HOSP IP/OBS HIGH 75: CPT | Performed by: HOSPITALIST

## 2022-07-19 PROCEDURE — 86140 C-REACTIVE PROTEIN: CPT | Performed by: EMERGENCY MEDICINE

## 2022-07-19 PROCEDURE — 250N000011 HC RX IP 250 OP 636: Performed by: EMERGENCY MEDICINE

## 2022-07-19 PROCEDURE — 120N000001 HC R&B MED SURG/OB

## 2022-07-19 PROCEDURE — 36415 COLL VENOUS BLD VENIPUNCTURE: CPT | Performed by: EMERGENCY MEDICINE

## 2022-07-19 PROCEDURE — 81001 URINALYSIS AUTO W/SCOPE: CPT | Performed by: EMERGENCY MEDICINE

## 2022-07-19 PROCEDURE — 83690 ASSAY OF LIPASE: CPT | Performed by: EMERGENCY MEDICINE

## 2022-07-19 PROCEDURE — 71046 X-RAY EXAM CHEST 2 VIEWS: CPT

## 2022-07-19 PROCEDURE — 84443 ASSAY THYROID STIM HORMONE: CPT | Performed by: EMERGENCY MEDICINE

## 2022-07-19 PROCEDURE — 83605 ASSAY OF LACTIC ACID: CPT | Performed by: EMERGENCY MEDICINE

## 2022-07-19 PROCEDURE — 84100 ASSAY OF PHOSPHORUS: CPT | Performed by: HOSPITALIST

## 2022-07-19 PROCEDURE — 85379 FIBRIN DEGRADATION QUANT: CPT | Performed by: HOSPITALIST

## 2022-07-19 PROCEDURE — 84484 ASSAY OF TROPONIN QUANT: CPT | Performed by: EMERGENCY MEDICINE

## 2022-07-19 PROCEDURE — 96375 TX/PRO/DX INJ NEW DRUG ADDON: CPT

## 2022-07-19 PROCEDURE — 82805 BLOOD GASES W/O2 SATURATION: CPT | Performed by: EMERGENCY MEDICINE

## 2022-07-19 PROCEDURE — C9803 HOPD COVID-19 SPEC COLLECT: HCPCS

## 2022-07-19 PROCEDURE — 87636 SARSCOV2 & INF A&B AMP PRB: CPT | Performed by: EMERGENCY MEDICINE

## 2022-07-19 PROCEDURE — 85610 PROTHROMBIN TIME: CPT | Performed by: HOSPITALIST

## 2022-07-19 PROCEDURE — 99285 EMERGENCY DEPT VISIT HI MDM: CPT | Mod: CS,25

## 2022-07-19 PROCEDURE — 74176 CT ABD & PELVIS W/O CONTRAST: CPT

## 2022-07-19 PROCEDURE — 85027 COMPLETE CBC AUTOMATED: CPT | Performed by: EMERGENCY MEDICINE

## 2022-07-19 PROCEDURE — 84145 PROCALCITONIN (PCT): CPT | Performed by: EMERGENCY MEDICINE

## 2022-07-19 PROCEDURE — 250N000013 HC RX MED GY IP 250 OP 250 PS 637: Performed by: EMERGENCY MEDICINE

## 2022-07-19 PROCEDURE — 82248 BILIRUBIN DIRECT: CPT | Performed by: EMERGENCY MEDICINE

## 2022-07-19 PROCEDURE — 258N000003 HC RX IP 258 OP 636: Performed by: EMERGENCY MEDICINE

## 2022-07-19 PROCEDURE — 87040 BLOOD CULTURE FOR BACTERIA: CPT | Performed by: HOSPITALIST

## 2022-07-19 PROCEDURE — 82077 ASSAY SPEC XCP UR&BREATH IA: CPT | Performed by: EMERGENCY MEDICINE

## 2022-07-19 PROCEDURE — 96365 THER/PROPH/DIAG IV INF INIT: CPT

## 2022-07-19 PROCEDURE — 80053 COMPREHEN METABOLIC PANEL: CPT | Performed by: EMERGENCY MEDICINE

## 2022-07-19 RX ORDER — FAMOTIDINE 20 MG/1
20 TABLET, FILM COATED ORAL DAILY
Status: DISCONTINUED | OUTPATIENT
Start: 2022-07-20 | End: 2022-07-20

## 2022-07-19 RX ORDER — MEROPENEM 1 G/1
1 INJECTION, POWDER, FOR SOLUTION INTRAVENOUS EVERY 12 HOURS
Status: DISCONTINUED | OUTPATIENT
Start: 2022-07-20 | End: 2022-07-22

## 2022-07-19 RX ORDER — ONDANSETRON 2 MG/ML
4 INJECTION INTRAMUSCULAR; INTRAVENOUS ONCE
Status: COMPLETED | OUTPATIENT
Start: 2022-07-19 | End: 2022-07-19

## 2022-07-19 RX ORDER — ACETAMINOPHEN 650 MG/1
650 SUPPOSITORY RECTAL EVERY 6 HOURS PRN
Status: DISCONTINUED | OUTPATIENT
Start: 2022-07-19 | End: 2022-07-27

## 2022-07-19 RX ORDER — ENOXAPARIN SODIUM 100 MG/ML
40 INJECTION SUBCUTANEOUS EVERY 24 HOURS
Status: DISCONTINUED | OUTPATIENT
Start: 2022-07-20 | End: 2022-07-27

## 2022-07-19 RX ORDER — ACETAMINOPHEN 325 MG/1
650 TABLET ORAL EVERY 6 HOURS PRN
Status: DISCONTINUED | OUTPATIENT
Start: 2022-07-19 | End: 2022-07-27

## 2022-07-19 RX ORDER — VANCOMYCIN HYDROCHLORIDE 1 G/200ML
1000 INJECTION, SOLUTION INTRAVENOUS EVERY 24 HOURS
Status: DISCONTINUED | OUTPATIENT
Start: 2022-07-20 | End: 2022-07-20

## 2022-07-19 RX ORDER — SODIUM CHLORIDE, SODIUM LACTATE, POTASSIUM CHLORIDE, CALCIUM CHLORIDE 600; 310; 30; 20 MG/100ML; MG/100ML; MG/100ML; MG/100ML
INJECTION, SOLUTION INTRAVENOUS CONTINUOUS
Status: ACTIVE | OUTPATIENT
Start: 2022-07-19 | End: 2022-07-20

## 2022-07-19 RX ORDER — ONDANSETRON 2 MG/ML
4 INJECTION INTRAMUSCULAR; INTRAVENOUS EVERY 6 HOURS PRN
Status: DISCONTINUED | OUTPATIENT
Start: 2022-07-19 | End: 2022-07-27 | Stop reason: HOSPADM

## 2022-07-19 RX ORDER — ONDANSETRON 4 MG/1
4 TABLET, ORALLY DISINTEGRATING ORAL EVERY 6 HOURS PRN
Status: DISCONTINUED | OUTPATIENT
Start: 2022-07-19 | End: 2022-07-27 | Stop reason: HOSPADM

## 2022-07-19 RX ORDER — LIDOCAINE 40 MG/G
CREAM TOPICAL
Status: DISCONTINUED | OUTPATIENT
Start: 2022-07-19 | End: 2022-07-27 | Stop reason: HOSPADM

## 2022-07-19 RX ORDER — ACETAMINOPHEN 325 MG/1
650 TABLET ORAL ONCE
Status: COMPLETED | OUTPATIENT
Start: 2022-07-19 | End: 2022-07-19

## 2022-07-19 RX ORDER — CEFTRIAXONE 1 G/1
1 INJECTION, POWDER, FOR SOLUTION INTRAMUSCULAR; INTRAVENOUS ONCE
Status: COMPLETED | OUTPATIENT
Start: 2022-07-19 | End: 2022-07-19

## 2022-07-19 RX ORDER — OXCARBAZEPINE 150 MG/1
TABLET, FILM COATED ORAL
Qty: 270 TABLET | Refills: 2 | Status: SHIPPED | OUTPATIENT
Start: 2022-07-19 | End: 2023-07-21

## 2022-07-19 RX ORDER — MEROPENEM 1 G/1
1 INJECTION, POWDER, FOR SOLUTION INTRAVENOUS EVERY 8 HOURS
Status: DISCONTINUED | OUTPATIENT
Start: 2022-07-19 | End: 2022-07-19

## 2022-07-19 RX ADMIN — ONDANSETRON 4 MG: 2 INJECTION INTRAMUSCULAR; INTRAVENOUS at 19:16

## 2022-07-19 RX ADMIN — CEFTRIAXONE SODIUM 1 G: 1 INJECTION, POWDER, FOR SOLUTION INTRAMUSCULAR; INTRAVENOUS at 19:32

## 2022-07-19 RX ADMIN — SODIUM CHLORIDE 1000 ML: 9 INJECTION, SOLUTION INTRAVENOUS at 19:32

## 2022-07-19 RX ADMIN — ACETAMINOPHEN 650 MG: 325 TABLET ORAL at 19:20

## 2022-07-19 ASSESSMENT — ACTIVITIES OF DAILY LIVING (ADL): ADLS_ACUITY_SCORE: 37

## 2022-07-19 ASSESSMENT — ENCOUNTER SYMPTOMS
SORE THROAT: 0
ABDOMINAL PAIN: 1
VOMITING: 1
RHINORRHEA: 1
DIARRHEA: 1
DIZZINESS: 1
NAUSEA: 1
FATIGUE: 1

## 2022-07-19 NOTE — ED TRIAGE NOTES
Pt presents with nausea, diarrhea for 10+ days.  Pt states she fell landing on pillow  And she states this happened as she becomes very dizzy when standing up     Triage Assessment     Row Name 07/19/22 1826       Triage Assessment (Adult)    Airway WDL WDL       Respiratory WDL    Respiratory WDL WDL       Skin Circulation/Temperature WDL    Skin Circulation/Temperature WDL WDL       Cardiac WDL    Cardiac WDL WDL       Peripheral/Neurovascular WDL    Peripheral Neurovascular WDL WDL       Cognitive/Neuro/Behavioral WDL    Cognitive/Neuro/Behavioral WDL WDL       Mauro Coma Scale    Best Eye Response 4-->(E4) spontaneous    Best Motor Response 6-->(M6) obeys commands    Best Verbal Response 5-->(V5) oriented    Mauro Coma Scale Score 15

## 2022-07-19 NOTE — ED PROVIDER NOTES
EMERGENCY DEPARTMENT ENCOUNTER       ED Course & Medical Decision Making     6:44 PM I met with the patient, obtained their history, performed my exam, and discussed ED plan.    9:35 PM I rechecked on the patient.    I saw and examined the patient.  She does have SIRS criteria.  I suspect that her weakness and fall were secondary to some infectious etiology.  IV is established she is given IV fluids, she is allergic to penicillin and cannot get Zosyn.  She is given Rocephin as my leading suspicion would be urinary or pulmonary source.    Work-up returns showing an infiltrate in her lungs and she returns COVID-positive, she does have significant leukocytosis and her lactic acid is over 4, otherwise her work-up is fairly unremarkable    She will be treated for sepsis with pulmonary source and treated for COVID -19.  She will require admission.  I spoke to the hospitalist who accepted the patient.  He requested that I add on remdesivir and this has been ordered.  She is stable, updated and in agreement          Prior to making a final disposition on this patient the results of patient's tests and other diagnostic studies were discussed with the patient. All questions were answered. Patient expressed understanding of the plan and was amenable to it.    Medications   azithromycin (ZITHROMAX) 500 mg in sodium chloride 0.9 % 250 mL intermittent infusion (has no administration in time range)   remdesivir 200 mg in sodium chloride 0.9 % 250 mL intermittent infusion (has no administration in time range)     Followed by   0.9% sodium chloride BOLUS (has no administration in time range)   remdesivir 100 mg in sodium chloride 0.9 % 250 mL intermittent infusion (has no administration in time range)     And   0.9% sodium chloride BOLUS (has no administration in time range)   ondansetron (ZOFRAN) injection 4 mg (4 mg Intravenous Given 7/19/22 1916)   0.9% sodium chloride BOLUS (0 mLs Intravenous Stopped 7/19/22 2100)    acetaminophen (TYLENOL) tablet 650 mg (650 mg Oral Given 7/19/22 1920)   cefTRIAXone (ROCEPHIN) 1 g vial to attach to  mL bag for ADULTS or NS 50 mL bag for PEDS (0 g Intravenous Stopped 7/19/22 2002)       Final Impression     1. Lung infiltrate    2. Infection due to 2019 novel coronavirus    3. Sepsis, due to unspecified organism, unspecified whether acute organ dysfunction present (H)            Chief Complaint     Chief Complaint   Patient presents with     Nausea, Vomiting, & Diarrhea       Pt presents with nausea, diarrhea for 10+ days.  Pt states she fell landing on pillow  And she states this happened as she becomes very dizzy when standing up     Triage Assessment     Row Name 07/19/22 1826       Triage Assessment (Adult)    Airway WDL WDL       Respiratory WDL    Respiratory WDL WDL       Skin Circulation/Temperature WDL    Skin Circulation/Temperature WDL WDL       Cardiac WDL    Cardiac WDL WDL       Peripheral/Neurovascular WDL    Peripheral Neurovascular WDL WDL       Cognitive/Neuro/Behavioral WDL    Cognitive/Neuro/Behavioral WDL WDL       [place] Coma Scale    Best Eye Response 4-->(E4) spontaneous    Best Motor Response 6-->(M6) obeys commands    Best Verbal Response 5-->(V5) oriented    [place] Coma Scale Score 15                  HPI       Julisa Chaney is a 77 year old female with a pertinent medical history of anemia and chronic pyloric ulcer who presents accompanied by her grandson by wheelchair for evaluation of dizziness and diarrhea.    About 1 week ago, the patient woke up feeling dizzy. She reports being so dizzy that she is having difficulty ambulating and needs to sit down when she tries to walk. The has also had associated nausea, vomiting, diarrhea, and fatigue. She notes occasional LLQ abdominal pain associated with her episodes of diarrhea.  Her symptoms have worsened over the last week, prompting her presentation to the ED today. Upon presentation at the ED, she is not  experiencing nausea or vomiting but was earlier today. At baseline, the patient has no difficulty amulating and does not use a walker or cane. The patient denies any sore throat or chest pain. The patient has rhinorrhea at baseline. Patient denies additional medical concerns or complaints at this time.      Per the patient's grandson who lives with the patient, he found the patient on the ground when he got home from work today. She says she fell and was unable to get up. She denies hitting her head, obtaining any injury, or losing consciousness.      I, Erica Jesusita am serving as a scribe to document services personally performed by Zain Weaver M.D. based on my observation and the provider's statements to me. I, Zain Weaver M.D attest that Erica Mc is acting in a scribe capacity, has observed my performance of the services and has documented them in accordance with my direction.    Past Medical History     Past Medical History:   Diagnosis Date     High cholesterol      Migraines      Sepsis (H) 8/10/2020     Past Surgical History:   Procedure Laterality Date     HYSTERECTOMY       ND ESOPHAGOGASTRODUODENOSCOPY TRANSORAL DIAGNOSTIC N/A 8/13/2020    Procedure: ESOPHAGOGASTRODUODENOSCOPY (EGD);  Surgeon: Nathan Smalls MD;  Location: [facility];  Service: Gastroenterology     ND ESOPHAGOGASTRODUODENOSCOPY TRANSORAL DIAGNOSTIC N/A 8/14/2020    Procedure: ESOPHAGOGASTRODUODENOSCOPY (EGD), PYLORIC DILATION;  Surgeon: Nathan Smalls MD;  Location: [facility] OR;  Service: Gastroenterology     [facility] COLONOSCOPY W/WO BRUSH/WASH N/A 8/13/2020    Procedure: COLONOSCOPY WITH POLYPECTOMY;  Surgeon: Nathan Smalls MD;  Location: [facility] OR;  Service: Gastroenterology     Family History   Problem Relation Age of Onset     Cancer Father      Testicular cancer Grandchild 17.00     Breast Cancer Mother      Breast Cancer Sister      Breast Cancer Maternal Aunt      Breast Cancer Sister   "     Social History     Tobacco Use     Smoking status: Former Smoker     Packs/day: 1.00     Years: 50.00     Pack years: 50.00     Quit date: 2014     Years since quittin.6     Smokeless tobacco: Never Used   Substance Use Topics     Alcohol use: No     Drug use: No       Relevant past medical, surgical, family and social history as documented above, has been reviewed and discussed with patient. No changes or additions, unless otherwise noted in the HPI.    Current Medications     acetaminophen-codeine (TYLENOL #3) 300-30 MG tablet  ARTIFICIAL TEARS 1.4 % ophthalmic solution  cholecalciferol, vitamin D3, 50 mcg (2,000 unit) Tab  desonide (DESOWEN) 0.05 % cream  ferrous sulfate 325 (65 FE) MG tablet  levETIRAcetam (KEPPRA) 500 MG tablet  mirtazapine (REMERON) 15 MG tablet  multivitamin (DAILY-DAVID) per tablet  nortriptyline (PAMELOR) 50 MG capsule  omeprazole (PRILOSEC) 40 MG DR capsule  OXcarbazepine (TRILEPTAL) 150 MG tablet  senna-docusate (SENOKOT-S/PERICOLACE) 8.6-50 MG tablet  topiramate (TOPAMAX) 50 MG tablet        Allergies     Allergies   Allergen Reactions     Contrast [Iohexol] Rash     Noticed during 2020 admission. Received IV contrast on      Chocolate Headache     Penicillins Rash       Review of Systems     Review of Systems   Constitutional: Positive for fatigue.   HENT: Positive for rhinorrhea (at baseline). Negative for sore throat.    Gastrointestinal: Positive for abdominal pain (LLQ), diarrhea, nausea and vomiting.   Neurological: Positive for dizziness. Negative for syncope.   All other systems reviewed and are negative.       Remainder of systems reviewed, unless noted in HPI all others negative.    Physical Exam     /63   Pulse 98   Temp 98.3  F (36.8  C) (Oral)   Resp 24   Ht 1.651 m (5' 5\")   Wt 54.4 kg (120 lb)   SpO2 96%   BMI 19.97 kg/m      Physical Exam  Vitals and nursing note reviewed.   Constitutional:       Appearance: She is ill-appearing.   HENT: "      Head: Normocephalic.      Nose: Nose normal.      Comments: Dry mucous membranes  Cardiovascular:      Rate and Rhythm: Tachycardia present.   Pulmonary:      Effort: Pulmonary effort is normal. No respiratory distress.      Breath sounds: No wheezing.   Neurological:      Mental Status: She is alert. Mental status is at baseline.   Psychiatric:         Mood and Affect: Mood normal.             Labs & Imaging         Labs Ordered and Resulted from Time of ED Arrival to Time of ED Departure   CBC WITH PLATELETS - Abnormal       Result Value    WBC Count 32.5 (*)     RBC Count 5.65 (*)     Hemoglobin 11.7      Hematocrit 40.5      MCV 72 (*)     MCH 20.7 (*)     MCHC 28.9 (*)     RDW 18.9 (*)     Platelet Count 647 (*)    BASIC METABOLIC PANEL - Abnormal    Sodium 136      Potassium 3.4 (*)     Chloride 99      Carbon Dioxide (CO2) 23      Anion Gap 14      Urea Nitrogen 28      Creatinine 1.11 (*)     Calcium 10.1      Glucose 252 (*)     GFR Estimate 51 (*)    HEPATIC FUNCTION PANEL - Abnormal    Bilirubin Total 0.5      Bilirubin Direct 0.2      Protein Total 8.9 (*)     Albumin 3.6      Alkaline Phosphatase 134 (*)     AST 25      ALT 31     LACTIC ACID WHOLE BLOOD - Abnormal    Lactic Acid 4.0 (*)    BLOOD GAS VENOUS - Abnormal    pH Venous 7.43      pCO2 Venous 34 (*)     pO2 Venous 28      Bicarbonate Venous 22 (*)     Base Excess/Deficit (+/-) -2.0      Oxyhemoglobin Venous 49.1 (*)     O2 Sat, Venous 49.8 (*)    ROUTINE UA WITH MICROSCOPIC REFLEX TO CULTURE - Abnormal    Color Urine Yellow      Appearance Urine Clear      Glucose Urine Negative      Bilirubin Urine Negative      Ketones Urine Negative      Specific Gravity Urine 1.021      Blood Urine 0.06 mg/dL (*)     pH Urine 5.5      Protein Albumin Urine 20  (*)     Urobilinogen Urine <2.0      Nitrite Urine Negative      Leukocyte Esterase Urine Negative      Mucus Urine Present (*)     RBC Urine 2      WBC Urine <1     INFLUENZA A/B & SARS-COV2  PCR MULTIPLEX - Abnormal    Influenza A PCR Negative      Influenza B PCR Negative      SARS CoV2 PCR Positive (*)    LIPASE - Normal    Lipase <9     TROPONIN I - Normal    Troponin I 0.03     MAGNESIUM - Normal    Magnesium 2.0     TSH WITH FREE T4 REFLEX - Normal    TSH 2.44     ETHYL ALCOHOL LEVEL - Normal    Alcohol, Blood <10     PROCALCITONIN   CRP INFLAMMATION   D DIMER QUANTITATIVE   BLOOD CULTURE   BLOOD CULTURE         Results for orders placed or performed during the hospital encounter of 07/19/22   CT Abdomen Pelvis w/o Contrast    Impression    IMPRESSION:   1.  Patchy consolidation and infiltrate at both lung bases are suspicious for pneumonia.  2.  No acute abnormality in the abdomen or pelvis.     Chest XR,  PA & LAT    Impression    IMPRESSION: Lungs are clear without consolidation or effusion. Cardiac and mediastinal silhouettes and osseous structures appear unchanged.   CBC with platelets   Result Value Ref Range    WBC Count 32.5 (H) 4.0 - 11.0 10e3/uL    RBC Count 5.65 (H) 3.80 - 5.20 10e6/uL    Hemoglobin 11.7 11.7 - 15.7 g/dL    Hematocrit 40.5 35.0 - 47.0 %    MCV 72 (L) 78 - 100 fL    MCH 20.7 (L) 26.5 - 33.0 pg    MCHC 28.9 (L) 31.5 - 36.5 g/dL    RDW 18.9 (H) 10.0 - 15.0 %    Platelet Count 647 (H) 150 - 450 10e3/uL   Basic metabolic panel   Result Value Ref Range    Sodium 136 136 - 145 mmol/L    Potassium 3.4 (L) 3.5 - 5.0 mmol/L    Chloride 99 98 - 107 mmol/L    Carbon Dioxide (CO2) 23 22 - 31 mmol/L    Anion Gap 14 5 - 18 mmol/L    Urea Nitrogen 28 8 - 28 mg/dL    Creatinine 1.11 (H) 0.60 - 1.10 mg/dL    Calcium 10.1 8.5 - 10.5 mg/dL    Glucose 252 (H) 70 - 125 mg/dL    GFR Estimate 51 (L) >60 mL/min/1.73m2   Hepatic function panel   Result Value Ref Range    Bilirubin Total 0.5 0.0 - 1.0 mg/dL    Bilirubin Direct 0.2 <=0.5 mg/dL    Protein Total 8.9 (H) 6.0 - 8.0 g/dL    Albumin 3.6 3.5 - 5.0 g/dL    Alkaline Phosphatase 134 (H) 45 - 120 U/L    AST 25 0 - 40 U/L    ALT 31 0 - 45 U/L    Result Value Ref Range    Lipase <9 0 - 52 U/L   Result Value Ref Range    Troponin I 0.03 0.00 - 0.29 ng/mL   Lactic acid whole blood   Result Value Ref Range    Lactic Acid 4.0 (HH) 0.7 - 2.0 mmol/L   Blood gas venous   Result Value Ref Range    pH Venous 7.43 7.35 - 7.45    pCO2 Venous 34 (L) 35 - 50 mm Hg    pO2 Venous 28 25 - 47 mm Hg    Bicarbonate Venous 22 (L) 24 - 30 mmol/L    Base Excess/Deficit (+/-) -2.0   mmol/L    Oxyhemoglobin Venous 49.1 (L) 70.0 - 75.0 %    O2 Sat, Venous 49.8 (L) 70.0 - 75.0 %   UA with Microscopic reflex to Culture    Specimen: Urine, Clean Catch   Result Value Ref Range    Color Urine Yellow Colorless, Straw, Light Yellow, Yellow    Appearance Urine Clear Clear    Glucose Urine Negative Negative mg/dL    Bilirubin Urine Negative Negative    Ketones Urine Negative Negative mg/dL    Specific Gravity Urine 1.021 1.001 - 1.030    Blood Urine 0.06 mg/dL (A) Negative    pH Urine 5.5 5.0 - 7.0    Protein Albumin Urine 20  (A) Negative mg/dL    Urobilinogen Urine <2.0 <2.0 mg/dL    Nitrite Urine Negative Negative    Leukocyte Esterase Urine Negative Negative    Mucus Urine Present (A) None Seen /LPF    RBC Urine 2 <=2 /HPF    WBC Urine <1 <=5 /HPF   Result Value Ref Range    Magnesium 2.0 1.8 - 2.6 mg/dL   TSH with free T4 reflex   Result Value Ref Range    TSH 2.44 0.30 - 5.00 uIU/mL   Ethyl Alcohol Level   Result Value Ref Range    Alcohol, Blood <10 None detected mg/dL   Symptomatic; Unknown Influenza A/B & SARS-CoV2 (COVID-19) Virus PCR Multiplex Nasopharyngeal    Specimen: Nasopharyngeal; Swab   Result Value Ref Range    Influenza A PCR Negative Negative    Influenza B PCR Negative Negative    SARS CoV2 PCR Positive (A) Negative       Zain Weaver MD  Emergency Medicine  [facility] EMERGENCY DEPARTMENT  [facility] [address]  [phone]  7/19/2022       Zain Weaver MD  07/19/22 3813

## 2022-07-19 NOTE — ED NOTES
"[facility] EMERGENCY DEPARTMENT  PHYSICIAN-IN-TRIAGE NOTE    MRN: [id number]    Julisa Chaney was seen in triage at 6:22 PM to expedite care while awaiting a room in the emergency department.    BRIEF HPI  The patient is here with nausea and vomiting. She has not been able to keep any food down since yesterday but. She has also had diarrhea and a fever. She's felt dizzy for several days and today it caused her to fall. No head injury or LOC, but she had difficulty getting up because of feeling generally weak.      BRIEF EXAM  /61   Pulse (!) 125   Temp (!) 100.8  F (38.2  C) (Temporal)   Resp 16   Ht 1.651 m (5' 5\")   Wt 54.4 kg (120 lb)   SpO2 98%   BMI 19.97 kg/m        Constitutional: Non-toxic appearing.    HENT: Atraumatic.    Pulm: No respiratory distress.    Abdomen: Non-peritonitic.    Neuro: Alert, oriented, answers questions appropriately.    Psych: Behavior appropriate.      PLAN  Orders placed to expedite care while awaiting full evaluation by an ED provider.      ORDERS  Orders Placed This Encounter   Procedures     CBC with platelets     Basic metabolic panel     Hepatic function panel     Lipase     Troponin I     Lactic acid whole blood     Blood gas venous     UA with Microscopic reflex to Culture     Magnesium     TSH with free T4 reflex     Ethyl Alcohol Level     Peripheral IV catheter     Medications   ondansetron (ZOFRAN) injection 4 mg (has no administration in time range)   0.9% sodium chloride BOLUS (has no administration in time range)   acetaminophen (TYLENOL) tablet 650 mg (has no administration in time range)         TRIAGE DISPO  Place patient in the next available ED bed      Patient was evaluated by the Physician in Triage due to a limitation of available rooms in the Emergency Department. A plan of care was discussed based on the information obtained on the initial evaluation and patient was consuled to return back to the Emergency Department " lobby after this initial evalutaiton until results were obtained or a room became available in the Emergency Department. Patient was counseled not to leave prior to receiving the results of their workup.        Maykel Sorto MD  07/19/22 7701

## 2022-07-20 ENCOUNTER — APPOINTMENT (OUTPATIENT)
Dept: SPEECH THERAPY | Facility: HOSPITAL | Age: 77
DRG: 871 | End: 2022-07-20
Attending: HOSPITALIST
Payer: COMMERCIAL

## 2022-07-20 ENCOUNTER — APPOINTMENT (OUTPATIENT)
Dept: RADIOLOGY | Facility: HOSPITAL | Age: 77
DRG: 871 | End: 2022-07-20
Attending: INTERNAL MEDICINE
Payer: COMMERCIAL

## 2022-07-20 ENCOUNTER — APPOINTMENT (OUTPATIENT)
Dept: SPEECH THERAPY | Facility: HOSPITAL | Age: 77
DRG: 871 | End: 2022-07-20
Payer: COMMERCIAL

## 2022-07-20 LAB
ANION GAP SERPL CALCULATED.3IONS-SCNC: 10 MMOL/L (ref 5–18)
BUN SERPL-MCNC: 20 MG/DL (ref 8–28)
C REACTIVE PROTEIN LHE: 26.1 MG/DL (ref 0–?)
CALCIUM SERPL-MCNC: 8.8 MG/DL (ref 8.5–10.5)
CHLORIDE BLD-SCNC: 108 MMOL/L (ref 98–107)
CO2 SERPL-SCNC: 21 MMOL/L (ref 22–31)
CREAT SERPL-MCNC: 0.66 MG/DL (ref 0.6–1.1)
D DIMER PPP FEU-MCNC: 1.36 UG/ML FEU (ref 0–0.5)
ERYTHROCYTE [DISTWIDTH] IN BLOOD BY AUTOMATED COUNT: 18 % (ref 10–15)
GFR SERPL CREATININE-BSD FRML MDRD: 90 ML/MIN/1.73M2
GLUCOSE BLD-MCNC: 116 MG/DL (ref 70–125)
HCT VFR BLD AUTO: 32.4 % (ref 35–47)
HGB BLD-MCNC: 9.3 G/DL (ref 11.7–15.7)
HOLD SPECIMEN: NORMAL
LACTATE SERPL-SCNC: 1.4 MMOL/L (ref 0.7–2)
MCH RBC QN AUTO: 20.6 PG (ref 26.5–33)
MCHC RBC AUTO-ENTMCNC: 28.7 G/DL (ref 31.5–36.5)
MCV RBC AUTO: 72 FL (ref 78–100)
MRSA DNA SPEC QL NAA+PROBE: POSITIVE
PLATELET # BLD AUTO: 472 10E3/UL (ref 150–450)
POTASSIUM BLD-SCNC: 3.4 MMOL/L (ref 3.5–5)
POTASSIUM BLD-SCNC: 4 MMOL/L (ref 3.5–5)
PROCALCITONIN SERPL-MCNC: 2.11 NG/ML (ref 0–0.49)
RBC # BLD AUTO: 4.52 10E6/UL (ref 3.8–5.2)
SA TARGET DNA: POSITIVE
SODIUM SERPL-SCNC: 139 MMOL/L (ref 136–145)
WBC # BLD AUTO: 26.2 10E3/UL (ref 4–11)

## 2022-07-20 PROCEDURE — 250N000013 HC RX MED GY IP 250 OP 250 PS 637: Performed by: INTERNAL MEDICINE

## 2022-07-20 PROCEDURE — 258N000003 HC RX IP 258 OP 636: Performed by: EMERGENCY MEDICINE

## 2022-07-20 PROCEDURE — 250N000011 HC RX IP 250 OP 636: Performed by: HOSPITALIST

## 2022-07-20 PROCEDURE — 258N000003 HC RX IP 258 OP 636: Performed by: HOSPITALIST

## 2022-07-20 PROCEDURE — 85027 COMPLETE CBC AUTOMATED: CPT | Performed by: HOSPITALIST

## 2022-07-20 PROCEDURE — 80048 BASIC METABOLIC PNL TOTAL CA: CPT | Performed by: HOSPITALIST

## 2022-07-20 PROCEDURE — 99233 SBSQ HOSP IP/OBS HIGH 50: CPT | Performed by: INTERNAL MEDICINE

## 2022-07-20 PROCEDURE — 36415 COLL VENOUS BLD VENIPUNCTURE: CPT | Performed by: HOSPITALIST

## 2022-07-20 PROCEDURE — 250N000013 HC RX MED GY IP 250 OP 250 PS 637: Performed by: HOSPITALIST

## 2022-07-20 PROCEDURE — 87040 BLOOD CULTURE FOR BACTERIA: CPT | Performed by: HOSPITALIST

## 2022-07-20 PROCEDURE — 36415 COLL VENOUS BLD VENIPUNCTURE: CPT | Performed by: INTERNAL MEDICINE

## 2022-07-20 PROCEDURE — 96375 TX/PRO/DX INJ NEW DRUG ADDON: CPT

## 2022-07-20 PROCEDURE — 86140 C-REACTIVE PROTEIN: CPT | Performed by: HOSPITALIST

## 2022-07-20 PROCEDURE — 250N000009 HC RX 250: Performed by: HOSPITALIST

## 2022-07-20 PROCEDURE — 87077 CULTURE AEROBIC IDENTIFY: CPT | Performed by: INTERNAL MEDICINE

## 2022-07-20 PROCEDURE — 85379 FIBRIN DEGRADATION QUANT: CPT | Performed by: HOSPITALIST

## 2022-07-20 PROCEDURE — 92610 EVALUATE SWALLOWING FUNCTION: CPT | Mod: GN

## 2022-07-20 PROCEDURE — 83605 ASSAY OF LACTIC ACID: CPT | Performed by: HOSPITALIST

## 2022-07-20 PROCEDURE — 84132 ASSAY OF SERUM POTASSIUM: CPT | Performed by: INTERNAL MEDICINE

## 2022-07-20 PROCEDURE — 87641 MR-STAPH DNA AMP PROBE: CPT | Performed by: INTERNAL MEDICINE

## 2022-07-20 PROCEDURE — 250N000011 HC RX IP 250 OP 636: Performed by: EMERGENCY MEDICINE

## 2022-07-20 PROCEDURE — 120N000001 HC R&B MED SURG/OB

## 2022-07-20 PROCEDURE — XW043E5 INTRODUCTION OF REMDESIVIR ANTI-INFECTIVE INTO CENTRAL VEIN, PERCUTANEOUS APPROACH, NEW TECHNOLOGY GROUP 5: ICD-10-PCS | Performed by: HOSPITALIST

## 2022-07-20 PROCEDURE — 92611 MOTION FLUOROSCOPY/SWALLOW: CPT | Mod: GN

## 2022-07-20 PROCEDURE — 96376 TX/PRO/DX INJ SAME DRUG ADON: CPT

## 2022-07-20 PROCEDURE — 74230 X-RAY XM SWLNG FUNCJ C+: CPT

## 2022-07-20 RX ORDER — OXCARBAZEPINE 150 MG/1
450 TABLET, FILM COATED ORAL AT BEDTIME
Status: DISCONTINUED | OUTPATIENT
Start: 2022-07-20 | End: 2022-07-27 | Stop reason: HOSPADM

## 2022-07-20 RX ORDER — POLYVINYL ALCOHOL 14 MG/ML
1 SOLUTION/ DROPS OPHTHALMIC
Status: DISCONTINUED | OUTPATIENT
Start: 2022-07-20 | End: 2022-07-27 | Stop reason: HOSPADM

## 2022-07-20 RX ORDER — DESONIDE 0.5 MG/G
CREAM TOPICAL 2 TIMES DAILY PRN
Status: DISCONTINUED | OUTPATIENT
Start: 2022-07-20 | End: 2022-07-27

## 2022-07-20 RX ORDER — PANTOPRAZOLE SODIUM 20 MG/1
40 TABLET, DELAYED RELEASE ORAL
Status: DISCONTINUED | OUTPATIENT
Start: 2022-07-20 | End: 2022-07-27 | Stop reason: HOSPADM

## 2022-07-20 RX ORDER — POTASSIUM CHLORIDE 20MEQ/15ML
40 LIQUID (ML) ORAL ONCE
Status: COMPLETED | OUTPATIENT
Start: 2022-07-20 | End: 2022-07-20

## 2022-07-20 RX ORDER — TOPIRAMATE 25 MG/1
50 TABLET, FILM COATED ORAL AT BEDTIME
Status: DISCONTINUED | OUTPATIENT
Start: 2022-07-20 | End: 2022-07-27 | Stop reason: HOSPADM

## 2022-07-20 RX ORDER — NORTRIPTYLINE HYDROCHLORIDE 50 MG/1
50 CAPSULE ORAL 2 TIMES DAILY
Status: DISCONTINUED | OUTPATIENT
Start: 2022-07-20 | End: 2022-07-27 | Stop reason: HOSPADM

## 2022-07-20 RX ORDER — HYDRALAZINE HYDROCHLORIDE 20 MG/ML
10 INJECTION INTRAMUSCULAR; INTRAVENOUS EVERY 6 HOURS PRN
Status: DISCONTINUED | OUTPATIENT
Start: 2022-07-20 | End: 2022-07-27

## 2022-07-20 RX ORDER — BENZTROPINE MESYLATE 0.5 MG/1
1 TABLET ORAL 3 TIMES DAILY PRN
Status: DISCONTINUED | OUTPATIENT
Start: 2022-07-20 | End: 2022-07-27

## 2022-07-20 RX ORDER — BARIUM SULFATE 400 MG/ML
SUSPENSION ORAL ONCE
Status: COMPLETED | OUTPATIENT
Start: 2022-07-20 | End: 2022-07-20

## 2022-07-20 RX ORDER — LEVETIRACETAM 500 MG/1
500 TABLET ORAL 2 TIMES DAILY
Status: DISCONTINUED | OUTPATIENT
Start: 2022-07-20 | End: 2022-07-26

## 2022-07-20 RX ORDER — NORTRIPTYLINE HCL 25 MG
50 CAPSULE ORAL 2 TIMES DAILY
Status: ON HOLD | COMMUNITY
Start: 2022-04-25 | End: 2022-10-04

## 2022-07-20 RX ADMIN — SODIUM CHLORIDE 50 ML: 9 INJECTION, SOLUTION INTRAVENOUS at 01:43

## 2022-07-20 RX ADMIN — LEVETIRACETAM 500 MG: 500 TABLET ORAL at 12:11

## 2022-07-20 RX ADMIN — SODIUM CHLORIDE, POTASSIUM CHLORIDE, SODIUM LACTATE AND CALCIUM CHLORIDE: 600; 310; 30; 20 INJECTION, SOLUTION INTRAVENOUS at 01:43

## 2022-07-20 RX ADMIN — POTASSIUM CHLORIDE 40 MEQ: 20 SOLUTION ORAL at 13:17

## 2022-07-20 RX ADMIN — REMDESIVIR 100 MG: 100 INJECTION, POWDER, LYOPHILIZED, FOR SOLUTION INTRAVENOUS at 21:40

## 2022-07-20 RX ADMIN — FAMOTIDINE 20 MG: 20 TABLET ORAL at 08:15

## 2022-07-20 RX ADMIN — POTASSIUM CHLORIDE 40 MEQ: 20 SOLUTION ORAL at 01:44

## 2022-07-20 RX ADMIN — ACETAMINOPHEN AND CODEINE PHOSPHATE 1 TABLET: 300; 30 TABLET ORAL at 18:42

## 2022-07-20 RX ADMIN — VANCOMYCIN HYDROCHLORIDE 1000 MG: 1 INJECTION, SOLUTION INTRAVENOUS at 01:48

## 2022-07-20 RX ADMIN — ACETAMINOPHEN 650 MG: 325 TABLET ORAL at 08:23

## 2022-07-20 RX ADMIN — OXCARBAZEPINE 450 MG: 150 TABLET, FILM COATED ORAL at 21:39

## 2022-07-20 RX ADMIN — ENOXAPARIN SODIUM 40 MG: 40 INJECTION SUBCUTANEOUS at 00:34

## 2022-07-20 RX ADMIN — BARIUM SULFATE 60 ML: 400 SUSPENSION ORAL at 11:13

## 2022-07-20 RX ADMIN — ACETAMINOPHEN 650 MG: 325 TABLET ORAL at 00:34

## 2022-07-20 RX ADMIN — LEVETIRACETAM 500 MG: 500 TABLET ORAL at 21:38

## 2022-07-20 RX ADMIN — REMDESIVIR 200 MG: 100 INJECTION, POWDER, LYOPHILIZED, FOR SOLUTION INTRAVENOUS at 00:28

## 2022-07-20 RX ADMIN — MEROPENEM 1 G: 1 INJECTION, POWDER, FOR SOLUTION INTRAVENOUS at 13:16

## 2022-07-20 RX ADMIN — NORTRIPTYLINE HYDROCHLORIDE 50 MG: 50 CAPSULE ORAL at 12:11

## 2022-07-20 RX ADMIN — MEROPENEM 1 G: 1 INJECTION, POWDER, FOR SOLUTION INTRAVENOUS at 02:46

## 2022-07-20 RX ADMIN — PANTOPRAZOLE SODIUM 40 MG: 20 TABLET, DELAYED RELEASE ORAL at 12:11

## 2022-07-20 RX ADMIN — PANTOPRAZOLE SODIUM 40 MG: 20 TABLET, DELAYED RELEASE ORAL at 18:41

## 2022-07-20 RX ADMIN — IPRATROPIUM BROMIDE AND ALBUTEROL 2 PUFF: 20; 100 SPRAY, METERED RESPIRATORY (INHALATION) at 13:14

## 2022-07-20 RX ADMIN — ONDANSETRON 4 MG: 2 INJECTION INTRAMUSCULAR; INTRAVENOUS at 00:33

## 2022-07-20 RX ADMIN — IPRATROPIUM BROMIDE AND ALBUTEROL 2 PUFF: 20; 100 SPRAY, METERED RESPIRATORY (INHALATION) at 16:11

## 2022-07-20 RX ADMIN — ACETAMINOPHEN AND CODEINE PHOSPHATE 1 TABLET: 300; 30 TABLET ORAL at 12:10

## 2022-07-20 RX ADMIN — NORTRIPTYLINE HYDROCHLORIDE 50 MG: 50 CAPSULE ORAL at 21:40

## 2022-07-20 RX ADMIN — BARIUM SULFATE 20 ML: 400 SUSPENSION ORAL at 11:13

## 2022-07-20 RX ADMIN — VANCOMYCIN HYDROCHLORIDE 750 MG: 1 INJECTION, POWDER, LYOPHILIZED, FOR SOLUTION INTRAVENOUS at 18:47

## 2022-07-20 RX ADMIN — SODIUM CHLORIDE 50 ML: 9 INJECTION, SOLUTION INTRAVENOUS at 21:43

## 2022-07-20 RX ADMIN — TOPIRAMATE 50 MG: 25 TABLET, FILM COATED ORAL at 21:38

## 2022-07-20 ASSESSMENT — ACTIVITIES OF DAILY LIVING (ADL)
ADLS_ACUITY_SCORE: 37
NUMBER_OF_TIMES_PATIENT_HAS_FALLEN_WITHIN_LAST_SIX_MONTHS: 1
EQUIPMENT_CURRENTLY_USED_AT_HOME: GRAB BAR, TUB/SHOWER
CONCENTRATING,_REMEMBERING_OR_MAKING_DECISIONS_DIFFICULTY: YES
WALKING_OR_CLIMBING_STAIRS_DIFFICULTY: NO
ADLS_ACUITY_SCORE: 37
DRESSING/BATHING_DIFFICULTY: NO
ADLS_ACUITY_SCORE: 37
TOILETING_ISSUES: NO
CHANGE_IN_FUNCTIONAL_STATUS_SINCE_ONSET_OF_CURRENT_ILLNESS/INJURY: YES
ADLS_ACUITY_SCORE: 37
ADLS_ACUITY_SCORE: 37
ADLS_ACUITY_SCORE: 23
ADLS_ACUITY_SCORE: 37
WEAR_GLASSES_OR_BLIND: NO
FALL_HISTORY_WITHIN_LAST_SIX_MONTHS: YES
ADLS_ACUITY_SCORE: 23
ADLS_ACUITY_SCORE: 37
DIFFICULTY_EATING/SWALLOWING: NO
ADLS_ACUITY_SCORE: 33
DEPENDENT_IADLS:: CLEANING;TRANSPORTATION
DOING_ERRANDS_INDEPENDENTLY_DIFFICULTY: YES

## 2022-07-20 NOTE — PHARMACY-VANCOMYCIN DOSING SERVICE
Pharmacy Vancomycin Initial Note  Date of Service 2022  Patient's  1945  77 year old, female    Indication: [facility] Pneumonia    Current estimated CrCl = Estimated Creatinine Clearance: 36.5 mL/min (A) (based on SCr of 1.11 mg/dL (H)).    Creatinine for last 3 days  2022:  7:01 PM Creatinine 1.11 mg/dL    Recent Vancomycin Level(s) for last 3 days  No results found for requested labs within last 72 hours.      Vancomycin IV Administrations (past 72 hours)      No vancomycin orders with administrations in past 72 hours.                Nephrotoxins and other renal medications (From now, onward)    Start     Dose/Rate Route Frequency Ordered Stop    22 0000  vancomycin (VANCOCIN) 1000 mg in dextrose 5% 200 mL PREMIX         1,000 mg  200 mL/hr over 1 Hours Intravenous EVERY 24 HOURS 22 2354            Contrast Orders - past 72 hours (72h ago, onward)    None          [employer] Prediction of Planned Initial Vancomycin Regimen  Loading dose: N/A  Regimen: 1000 mg IV every 24 hours.  Start time: 23:55 on 2022  Exposure target: AUC24 (range)400-600 mg/L.hr   AUC24,ss: 526 mg/L.hr  Probability of AUC24 > 400: 80 %  Ctrough,ss: 16.2 mg/L  Probability of Ctrough,ss > 20: 28 %  Probability of nephrotoxicity (Lodise ESTIVEN ): 12 %          Plan:  1. Start vancomycin  1000 mg IV q24h.   2. Vancomycin monitoring method: AUC  3. Vancomycin therapeutic monitoring goal: 400-600 mg*h/L  4. Pharmacy will check vancomycin levels as appropriate in 1-3 Days.    5. Serum creatinine levels will be ordered daily for the first week of therapy and at least twice weekly for subsequent weeks.      Rosalind Marino, [facility]

## 2022-07-20 NOTE — PLAN OF CARE
Problem: Fluid Imbalance (Pneumonia)  Goal: Fluid Balance  Outcome: Ongoing, Progressing     Problem: Infection (Pneumonia)  Goal: Resolution of Infection Signs and Symptoms  Outcome: Ongoing, Progressing  Intervention: Prevent Infection Progression     Problem: Respiratory Compromise (Pneumonia)  Goal: Effective Oxygenation and Ventilation  Outcome: Ongoing, Progressing  Intervention: Promote Airway Secretion Clearance      Pt alert and oriented, calls appropriately for assistance.  Has infrequent dry cough, O2 sat 92-95% room air.  Received tylenol for c/o headache otherwise denies pain. Generalized weakness.  IV abx given.  Pt did not void overnight - bladder scanned around 6am for 1000 - pt reported no urge to void.  Straight cathed for 1300ccs.  Pt NPO until speech evals.

## 2022-07-20 NOTE — PROGRESS NOTES
[facility]    PROGRESS NOTE - Hospitalist Service    ASSESSMENT AND PLAN     Principal Problem:    Infection due to 2019 novel coronavirus  Active Problems:    Sepsis (H)    Sepsis, due to unspecified organism, unspecified whether acute organ dysfunction present (H)    ORTIZ (acute kidney injury) (H)    Julisa Chaney is a 77 year old female admitted on 7/19/2022. She came to the ED for evaluation of diarrhea, fevers, generalized weakness, lightheadedness, fall.     Confirmed COVID-19 infection     Viral Pneumonia secondary to COVID-19 infection   Bacterial Superinfection   Severe sepsis due to Pneumonia- concern for aspiration PNA    Fully vaccinated with booster   Inhalers as needed   Gentle fluid hydration 50 mL/h-monitor for fluid overload   Meropenem and vancomycin initiated 7/19   Remdesivir initiated 7/19   No dexamethasone-patient is not hypoxic   Lovenox 40mg daily    New onset delirium and encephalopathy secondary to above   Encephalopathy order set   Should hopefully improve with resolution of sepsis     Diarrhea   CT scan showed no acute abnormality in the abdomen or pelvis   Likely related to COVID infection   Check stool work-up and C. difficile     Dysphagia   Reports occasional coughing with meals   Suspected aspiration in February 2022, video swallow at that time showed trace amount of aspiration with thin barium consistency   NPO   Video swallow today with some aspiration with thin liquids.  Awaiting final speech recommendations regarding diet     Acute urinary retention requiring straight cath   Possibly related to acute illness   Frequently check bladder scans.  May need temporary Abrams catheter this admission     Acute kidney injury   Secondary to volume depletion   Status post 1 L IV bolus in the ED   Continue gentle IV hydration    Monitor for volume overload    Avoid nephrotoxins including NSAIDs     Hypokalemia   Secondary to diarrhea   Replace per  protocol     Hyperglycemia- improved without insulin    Continue to monitor   A1c of 5.6      Trigeminal neuralgia, chronic pain   Home meds resumed      Depression   Did not resume mirtazapine as patient reportedly stopped taking this medication due to somnolence    Resume home nortriptyline      #Iron deficiency anemia  Stable hemoglobin, likely hemoconcentrated  No signs of acute blood loss    Discussed CODE STATUS with patient-full code    Barriers to discharge: Improvement in respiratory status, IV medications, speech recommendations    Anticipated length of stay: Several days    Called and updated daughter Ana.  Spent greater than 10 minutes on the phone    Present on Admission:    Sepsis (H)    Sepsis, due to unspecified organism, unspecified whether acute organ dysfunction present (H)    Infection due to 2019 novel coronavirus    ORTIZ (acute kidney injury) (H)      Subjective:  Patient resting comfortably in bed.  Notes that her breathing is doing okay.  Denies any chest pain.  States she has a terrible headache and overall body aches.  Also complained of urinary retention which was resolved with straight cath overnight.  No other complaints.    PHYSICAL EXAM  Temp:  [98.3  F (36.8  C)-100.8  F (38.2  C)] 99.1  F (37.3  C)  Pulse:  [] 110  Resp:  [16-41] 18  BP: (137-180)/() 169/78  SpO2:  [94 %-98 %] 94 %  Wt Readings from Last 1 Encounters:   07/19/22 54.4 kg (120 lb)       Intake/Output Summary (Last 24 hours) at 7/20/2022 1203  Last data filed at 7/20/2022 0622  Gross per 24 hour   Intake 550 ml   Output 1300 ml   Net -750 ml      Body mass index is 19.97 kg/m .    GENRL: Alert and answering questions appropriately. Not in acute distress. Lying in bed   HEENT: no JVP elevation, no lymphadenopathy or thyromegaly  CHEST: Diminished throughout.  No wheezes noted.    HEART: Tachycardic.  No murmur auscultated.    ABDMN: Soft. Non-tender, non-distended. No organomegaly. No guarding or rigidity.  Bowel sounds present   EXTRM: No pedal edema, DP pulses 2+.   NEURO: Cranial nerves II-XII grossly intact. No focal neurological deficit. No involuntary movements.   PSYCH: flat affect and mood.   INTGM: No skin rash, no cyanosis or clubbing    PERTINENT LABS/IMAGING:  Results for orders placed or performed during the hospital encounter of 07/19/22   CT Abdomen Pelvis w/o Contrast    Impression    IMPRESSION:   1.  Patchy consolidation and infiltrate at both lung bases are suspicious for pneumonia.  2.  No acute abnormality in the abdomen or pelvis.     Chest XR,  PA & LAT    Impression    IMPRESSION: Lungs are clear without consolidation or effusion. Cardiac and mediastinal silhouettes and osseous structures appear unchanged.     Most Recent 3 CBC's:Recent Labs   Lab Test 07/20/22  0923 07/19/22  1901 02/17/22  1500   WBC 26.2* 32.5* 9.6   HGB 9.3* 11.7 9.3*   MCV 72* 72* 82   * 647* 443       Recent Labs   Lab Test 05/19/22  1430   CHOL 247*   HDL 77   *   TRIG 105     Recent Labs   Lab Test 05/19/22  1430 05/04/21  1205 05/10/18  1225   * 114 125     Recent Labs   Lab Test 07/20/22  0923      POTASSIUM 3.4*   CHLORIDE 108*   CO2 21*      BUN 20   CR 0.66   GFRESTIMATED 90   ADY 8.8     Recent Labs   Lab Test 07/19/22 1901   A1C 5.6     Recent Labs   Lab Test 07/20/22  0923 07/19/22  1901 02/17/22  1500   HGB 9.3* 11.7 9.3*     Recent Labs   Lab Test 07/19/22  1901 02/07/22  0011 02/06/19  2125   TROPONINI 0.03 <0.01 0.05     No results for input(s): BNP, NTBNPI, NTBNP in the last 39188 hours.  Recent Labs   Lab Test 07/19/22  1901   TSH 2.44     Recent Labs   Lab Test 07/19/22  2255 02/07/22  0012 02/06/19  2125   INR 1.36* 1.05 0.97       Ashwini Dukes DO  Hospitalist Service  [facility]  Text page via [facility] Paging/Directory

## 2022-07-20 NOTE — PHARMACY-ADMISSION MEDICATION HISTORY
Pharmacy Note - Admission Medication History    Pertinent Provider Information:     -Per patient's daughter, pt's son sets up and helps with/administers meds. Son unreachable at this time    -Pt alerted RN that she does not know her medications and her family helps her with them. Attempted to speak with pt but she was unable to answer phone.    -Pt's daughter confirmed meds and stated pt is likely not taking otc vit D, iron, mvi, or stool softener, but could not confirm. Also stated pt likely did not take any meds yesterday.    -Mirtazapine has not been filled since 5/19/22 for 30 day supply. Daughter believes pt stopped this d/t side effects (sleeping all day, no energy, felt worse). Please assess if it should be removed from med list.     -Daughter is unsure if pt ran out of keppra as she does not know if anyone picked it up for her. Does look like a fill was placed 7/4/22. Pt has stopped taking this in the past when she runs out.  ______________________________________________________________________    Prior To Admission (PTA) med list completed and updated in EMR.       PTA Med List   Medication Sig Last Dose     acetaminophen-codeine (TYLENOL #3) 300-30 MG tablet TAKE 1 TABLET BY MOUTH EVERY 6 HOURS AS NEEDED FOR PAIN Past Week     ARTIFICIAL TEARS 1.4 % ophthalmic solution INSTILL 2 DROPS IN BOTH EYES AS NEEDED      cholecalciferol, vitamin D3, 50 mcg (2,000 unit) Tab [CHOLECALCIFEROL, VITAMIN D3, 50 MCG (2,000 UNIT) TAB] Take 1 tablet (2,000 Units total) by mouth daily. One tab daily Unknown     desonide (DESOWEN) 0.05 % cream [DESONIDE (DESOWEN) 0.05 % CREAM] Apply to affected area 2 times daily (Patient taking differently: Apply topically 2 times daily as needed)      ferrous sulfate 325 (65 FE) MG tablet [FERROUS SULFATE 325 (65 FE) MG TABLET] Take 1 tablet (325 mg total) by mouth daily with breakfast. Unknown at Unknown time     levETIRAcetam (KEPPRA) 500 MG tablet Take 1 tablet (500 mg) by mouth 2 times  daily Unknown     multivitamin (DAILY-DAVID) per tablet [MULTIVITAMIN (DAILY-DAVID) PER TABLET] Take 1 tablet by mouth daily. Unknown     nortriptyline (PAMELOR) 25 MG capsule Take 50 mg by mouth 2 times daily Past Week     omeprazole (PRILOSEC) 40 MG DR capsule Take 1 capsule (40 mg) by mouth daily Past Week     OXcarbazepine (TRILEPTAL) 150 MG tablet TAKE 3 TABLETS(450 MG) BY MOUTH AT BEDTIME Past Week at Unknown time     senna-docusate (SENOKOT-S/PERICOLACE) 8.6-50 MG tablet Take 1 tablet by mouth At Bedtime Unknown     topiramate (TOPAMAX) 50 MG tablet Take 1 tablet (50 mg) by mouth At Bedtime Past Week       Information source(s): Family member, Clinic records, Hospital records and [facility]  Method of interview communication: phone    Summary of Changes to PTA Med List  New: -  Stopped by Patient: remeron (unable to remove from list, please assess if it should be d/c'd), likely not taking vitamins.   Changed: -    Patient was asked about OTC/herbal products specifically.  PTA med list reflects this.    In the past week, patient estimated taking medication this percent of the time:  50-90% due to other.    Allergies were reviewed, assessed, and updated with the patient.      Patient did not bring any medications to the hospital and can't retrieve from home. No multi-dose medications are available for use during hospital stay.     The information provided in this note is only as accurate as the sources available at the time of the update(s).    Thank you for the opportunity to participate in the care of this patient.    Rosalind Martínez, PharmD, BCPS 07/20/22 9:02 AM

## 2022-07-20 NOTE — PROGRESS NOTES
Speech-Language Pathology: Clinical Swallow Evaluation     07/20/22 1200   General Information   Onset of Illness/Injury or Date of Surgery 07/19/22   Pertinent History of Current Problem Julisa Chaney is a 77 year old female who was brought to the ED for evaluation of above chief complaint.  Past medical history of trigeminal neuralgia, chronic pain, migraines, hyperlipidemia, hypercalcemia, iron deficiency anemia, depression, pyloric ulcer.  She has felt unwell over the last 5 days with watery nonbloody diarrhea.  She has felt lightheaded and today fell on a pillow but denied head trauma or loss of consciousness.  She also complained of generalized body aches but denied chest pain or palpitations.  She has had an occasional cough especially when she eats.  She has not taken her medications consistently over the last 5 days.  She also reported some fevers and upon ED arrival temperature was 100.8  F and heart rate 125.  Work-up was consistent with COVID-pneumonia and severe sepsis.  Patient has been vaccinated and recently boosted about a month ago.  She lives at home with her son, denies tobacco, alcohol or drugs.   General Observations Alert and talkative   Past History of Dysphagia VFSS 2/7/22 showed silent aspiration wtih thin liquids and recommendation for small sips of thin with chin tuck and minced and moist food textures to compensate for lack of dentition/overbite. I asked her today if she remembered the VFSS and she independently stated that she needs to bring her chin down to swallow when drinking. She stated that she has not been doing that lately because she hurt her neck - she thinks she slept on it wrong.   Type of Evaluation   Type of Evaluation Swallow Evaluation   Oral Motor   Oral Musculature generally intact   Structural Abnormalities other (see comments)  (large overbite)   Mucosal Quality adequate   Dentition (Oral Motor)   Dentition (Oral Motor) significant number of missing teeth;dentition  impacts chewing ability;poor condition;other (see comments)  (Pt has upper dental implants that are quite large as pt has a hard time closing lips around her teeth. She has limited teeth on the bottom. She states that she does not brush her teeth because it is very sensitive on upper (L).)   Facial Symmetry (Oral Motor)   Facial Symmetry (Oral Motor) WNL   Lip Function (Oral Motor)   Lip Range of Motion (Oral Motor) other (see comments)  (difficulty pursing lips because of dentition)   Tongue Function (Oral Motor)   Tongue ROM (Oral Motor) WNL   Jaw Function (Oral Motor)   Jaw Function (Oral Motor) WNL   Vocal Quality/Secretion Management (Oral Motor)   Vocal Quality (Oral Motor) WNL   General Swallowing Observations   Current Diet/Method of Nutritional Intake (General Swallowing Observations, NIS) NPO   Swallowing Evaluation Clinical swallow evaluation   Clinical Swallow Evaluation   Feeding Assistance no assistance needed   Additional evaluation(s) completed today Yes   Rationale for completing additional evaluation VFSS recommended given hx silent aspiration and she is in with probable aspiration pneumonia.   Clinical Swallow Evaluation Textures Trialed thin liquids;soft & bite-sized   Clinical Swallow Eval: Thin Liquid Texture Trial   Mode of Presentation, Thin Liquids straw;self-fed   Volume of Liquid or Food Presented about 1 oz   Oral Phase of Swallow WFL   Diagnostic Statement No coughing or throat clearing noted with straw drinking thin. She did have multiple swallow noted at times that could indicate pharyngeal stasis.   Clinical Swallow Eval: Soft & Bite Sized   Mode of Presentation fed by clinician   Volume Presented softened brea cracker   Oral Phase WFL   Diagnostic Statement No oral dysphagia or overt sx's aspiration noted with soft textures. (baseline diet is soft foods)   Swallowing Recommendations   Diet Consistency Recommendations other (see comments)  (will complete VFSS and make diet  recommendations based on results.)   Instrumental Assessment Recommendations VFSS (videofluoroscopic swallowing study)   Comment, Swallowing Recommendations No overt sx's aspiration noted with thin liquids and soft foods, however pt has hx silent aspiration wtih thin liquids. VFSS is warranted to assess aspiration risk and make safe nutrition recommenation.   General Therapy Interventions   Planned Therapy Interventions Dysphagia Treatment   Clinical Impression   Criteria for Skilled Therapeutic Interventions Met (SLP Eval) Yes, treatment indicated   SLP Diagnosis dysphagia   Risks & Benefits of therapy have been explained risks/benefits reviewed;evaluation/treatment results reviewed   Clinical Impression Comments No overt sx's aspiration noted with thin liquids and soft foods, however pt has hx silent aspiration wtih thin liquids. VFSS is warranted to assess aspiration risk and make safe nutrition recommenation.   SLP Discharge Planning   SLP Discharge Recommendation home with assist   SLP Rationale for DC Rec below baseline for swallow    Total Evaluation Time   Total Evaluation Time (Minutes) 20

## 2022-07-20 NOTE — CARE PLAN
"Pt hallucinating twice this shift (that I know of). Her television was not on all morning.    She said that the tv show, pointing to the ceiling directly over her head, was showing skeletons and that she couldn't turn it off so it replayed.    Just now she asked me: \"is that your baby?\" and indicated the corner of the ceiling behind the door.  "

## 2022-07-20 NOTE — PHARMACY-VANCOMYCIN DOSING SERVICE
Pharmacy Vancomycin Note  Date of Service 2022  Patient's  1945   77 year old, female    Indication: Healthcare-Associated Pneumonia  Day of Therapy: 1  Current vancomycin regimen:  1000 mg IV q24h  Current vancomycin monitoring method: AUC  Current vancomycin therapeutic monitoring goal: 400-600 mg*h/L    [employer] Prediction of Current Vancomycin Regimen  Regimen: 1000 mg IV every 24 hours.  Start time: 15:41 on 2022  Exposure target: AUC24 (range)400-600 mg/L.hr   AUC24,ss: 375 mg/L.hr  Probability of AUC24 > 400: 43 %  Ctrough,ss: 10 mg/L  Probability of Ctrough,ss > 20: 6 %  Probability of nephrotoxicity (Lodise ESTIVEN ): 6 %    Current estimated CrCl = Estimated Creatinine Clearance: 61.3 mL/min (based on SCr of 0.66 mg/dL).    Creatinine for last 3 days  2022:  7:01 PM Creatinine 1.11 mg/dL  2022:  9:23 AM Creatinine 0.66 mg/dL    Recent Vancomycin Levels (past 3 days)  No results found for requested labs within last 72 hours.    Vancomycin IV Administrations (past 72 hours)                   vancomycin (VANCOCIN) 1000 mg in dextrose 5% 200 mL PREMIX (mg) 1,000 mg New Bag 22 0148                Nephrotoxins and other renal medications (From now, onward)    Start     Dose/Rate Route Frequency Ordered Stop    22 0000  vancomycin (VANCOCIN) 1000 mg in dextrose 5% 200 mL PREMIX         1,000 mg  200 mL/hr over 1 Hours Intravenous EVERY 24 HOURS 22 2354               Contrast Orders - past 72 hours (72h ago, onward)    Start     Dose/Rate Route Frequency Stop    22 1130  barium sulfate (VARIBAR THIN Liquid) 40 % oral suspension 24 g         60 mL Oral ONCE 22 1114    22 1130  barium sulfate 40% (VARIBAR NECTAR) oral suspension          Oral ONCE 22 1113    22 1130  barium sulfate (VARIBAR) 40 % pudding/paste 20 mL         20 mL Oral ONCE 22 1113    22 1130  barium sulfate 40% (VARIBAR THIN HONEY) oral suspension           Oral ONCE 07/20/22 1113          [employer] Prediction of Planned New Vancomycin Regimen  Regimen: 750 mg IV every 12 hours.  Start time: 15:41 on 07/20/2022  Exposure target: AUC24 (range)400-600 mg/L.hr   AUC24,ss: 552 mg/L.hr  Probability of AUC24 > 400: 82 %  Ctrough,ss: 17.9 mg/L  Probability of Ctrough,ss > 20: 40 %  Probability of nephrotoxicity (Lodise ESTIVEN 2009): 14 %    Plan:  1. Empirically change dosing regimen to 750 mg q12h due to change in renal function.  2. Vancomycin monitoring method: AUC  3. Vancomycin therapeutic monitoring goal: 400-600 mg*h/L  4. Pharmacy will check vancomycin levels as appropriate in 1-3 Days.  5. Serum creatinine levels will be ordered daily for the first week of therapy and at least twice weekly for subsequent weeks.    Ashwini Braga, [facility]

## 2022-07-20 NOTE — H&P
[facility]    History and Physical - Hospitalist Service       Date of Admission:  7/19/2022    Assessment & Plan      Julisa Chaney is a 77 year old female admitted on 7/19/2022. She came to the ED for evaluation of diarrhea, fevers, generalized weakness, lightheadedness, fall.    # Confirmed COVID-19 infection    # Viral Pneumonia secondary to COVID-19 infection  # Bacterial Superinfection  #Severe sepsis due to Pneumonia  # Healthcare associated pneumonia versus aspiration pneumonitis     Symptom Onset 7/15/2022   Date of 1st Positive Test 7/19/2022   Vaccination Status Fully Vaccinated       - COVID-19 special precautions, continuous pulse-ox  - Oxygen: continue current support with O2 Device: None (Room air) at  ; titrate to keep SpO2 between 90-96%  - Labs: Standard COVID admission labs ordered (CBC with diff, CMP, INR, D-dimer, CRP).  Repeat lactic acid and trend.  Follow-up blood cultures.  - Imaging: no additional imaging needed at this time  - Breathing treatments: Combivent inhaler four times a day and PRN; avoid nebulizers in favor of MDIs   - IV fluids: continuous at a rate of 50 mL/hr; hydrate cautiously to avoid worsening respiratory status with volume overload.  - Antibiotics: indicated due to concern of bacterial superinfection; Meropenem and vancomycin ordered   - COVID-Focused Medications: Remdesivir x 5 days or until hospital discharge, started on 7/19/2022  - DVT Prophylaxis:         - At high risk of thrombotic complications due to COVID-19 (DDimer = N/A )         - PROPHYLACTIC dosing: lovenox 40mg daily    #Diarrhea  CT scan showed no acute abnormality in the abdomen or pelvis  Likely related to COVID infection  Given recent antibiotics we will check stool work-up and C. difficile    #Dysphagia  Reports occasional coughing with meals  Suspected aspiration in February 2022, video swallow at that time showed trace amount of aspiration with thin barium  consistency  Nothing by mouth  SLP evaluation    #Acute kidney injury  Secondary to volume depletion  Status post 1 L IV bolus in the ED  Continue gentle IV hydration while nothing by mouth pending SLP evaluation  Monitor volume status to prevent volume overload secondary to COVID  Avoid nephrotoxins including NSAIDs    #Hypokalemia  Secondary to diarrhea  Replace per protocol    #Hyperglycemia  Patient states that she has been eating poorly especially sugary meals  Check hemoglobin A1c    #Trigeminal neuralgia, chronic pain  Reconcile PTA medications    #Depression  Reconcile PTA medication    #Iron deficiency anemia  Stable hemoglobin, likely hemoconcentrated  No signs of acute blood loss     Diet: NPO for Medical/Clinical Reasons Except for: Meds    DVT Prophylaxis: Enoxaparin (Lovenox) SQ  Abrams Catheter: Not present  Central Lines: None  Cardiac Monitoring: ACTIVE order. Indication: sepsis  Code Status: Full Code      Clinically Significant Risk Factors Present on Admission                          Disposition Plan         The patient's care was discussed with the Patient.    Nirmal Hagen MD  Hospitalist Service  [facility]  Securely message with the Vocera Web Console (learn more here)  Text page via [employer] Paging/Directory         ______________________________________________________________________    Chief Complaint   Diarrhea, weakness, fever, fall    History is obtained from the patient, electronic health record and emergency department physician    History of Present Illness   Julisa Chaney is a 77 year old female who was brought to the ED for evaluation of above chief complaint.  Past medical history of trigeminal neuralgia, chronic pain, migraines, hyperlipidemia, hypercalcemia, iron deficiency anemia, depression, pyloric ulcer.  She has felt unwell over the last 5 days with watery nonbloody diarrhea.  She has felt lightheaded and today fell on a pillow but denied head  trauma or loss of consciousness.  She also complained of generalized body aches but denied chest pain or palpitations.  She has had an occasional cough especially when she eats.  She has not taken her medications consistently over the last 5 days.  She also reported some fevers and upon ED arrival temperature was 100.8  F and heart rate 125.  Work-up was consistent with COVID-pneumonia and severe sepsis.  Patient has been vaccinated and recently boosted about a month ago.  She lives at home with her son, denies tobacco, alcohol or drugs.    Review of Systems    The 10 point Review of Systems is negative other than noted in the HPI or here.     Past Medical History    I have reviewed this patient's medical history and updated it with pertinent information if needed.   Past Medical History:   Diagnosis Date     High cholesterol      Migraines      Sepsis (H) 8/10/2020       Past Surgical History   I have reviewed this patient's surgical history and updated it with pertinent information if needed.  Past Surgical History:   Procedure Laterality Date     HYSTERECTOMY       CO ESOPHAGOGASTRODUODENOSCOPY TRANSORAL DIAGNOSTIC N/A 8/13/2020    Procedure: ESOPHAGOGASTRODUODENOSCOPY (EGD);  Surgeon: Nathan Smalls MD;  Location: [facility];  Service: Gastroenterology     CO ESOPHAGOGASTRODUODENOSCOPY TRANSORAL DIAGNOSTIC N/A 8/14/2020    Procedure: ESOPHAGOGASTRODUODENOSCOPY (EGD), PYLORIC DILATION;  Surgeon: Nathan Smalls MD;  Location: [facility];  Service: Gastroenterology     [facility] COLONOSCOPY W/WO BRUSH/WASH N/A 8/13/2020    Procedure: COLONOSCOPY WITH POLYPECTOMY;  Surgeon: Nathan Smalls MD;  Location: [facility];  Service: Gastroenterology       Social History   I have reviewed this patient's social history and updated it with pertinent information if needed.  Social History     Tobacco Use     Smoking status: Former Smoker     Packs/day: 1.00     Years: 50.00     Pack years: 50.00      Quit date: 2014     Years since quittin.6     Smokeless tobacco: Never Used   Substance Use Topics     Alcohol use: No     Drug use: No       Family History   I have reviewed this patient's family history and updated it with pertinent information if needed.  Family History   Problem Relation Age of Onset     Cancer Father      Testicular cancer Grandchild 17.00     Breast Cancer Mother      Breast Cancer Sister      Breast Cancer Maternal Aunt      Breast Cancer Sister        Prior to Admission Medications   Prior to Admission Medications   Prescriptions Last Dose Informant Patient Reported? Taking?   ARTIFICIAL TEARS 1.4 % ophthalmic solution   No No   Sig: INSTILL 2 DROPS IN BOTH EYES AS NEEDED   OXcarbazepine (TRILEPTAL) 150 MG tablet   No No   Sig: TAKE 3 TABLETS(450 MG) BY MOUTH AT BEDTIME   acetaminophen-codeine (TYLENOL #3) 300-30 MG tablet   No No   Sig: TAKE 1 TABLET BY MOUTH EVERY 6 HOURS AS NEEDED FOR PAIN   cholecalciferol, vitamin D3, 50 mcg (2,000 unit) Tab   No No   Sig: [CHOLECALCIFEROL, VITAMIN D3, 50 MCG (2,000 UNIT) TAB] Take 1 tablet (2,000 Units total) by mouth daily. One tab daily   desonide (DESOWEN) 0.05 % cream   No No   Sig: [DESONIDE (DESOWEN) 0.05 % CREAM] Apply to affected area 2 times daily   Patient taking differently: Apply topically 2 times daily as needed   ferrous sulfate 325 (65 FE) MG tablet   No No   Sig: [FERROUS SULFATE 325 (65 FE) MG TABLET] Take 1 tablet (325 mg total) by mouth daily with breakfast.   levETIRAcetam (KEPPRA) 500 MG tablet   No No   Sig: Take 1 tablet (500 mg) by mouth 2 times daily   mirtazapine (REMERON) 15 MG tablet   No No   Sig: Take 1 tablet (15 mg) by mouth At Bedtime   multivitamin (DAILY-DAVID) per tablet   No No   Sig: [MULTIVITAMIN (DAILY-DAVID) PER TABLET] Take 1 tablet by mouth daily.   nortriptyline (PAMELOR) 50 MG capsule   No No   Sig: Take 1 capsule (50 mg) by mouth 2 times daily   omeprazole (PRILOSEC) 40 MG DR capsule   No No   Sig:  Take 1 capsule (40 mg) by mouth daily   senna-docusate (SENOKOT-S/PERICOLACE) 8.6-50 MG tablet   No No   Sig: Take 1 tablet by mouth At Bedtime   topiramate (TOPAMAX) 50 MG tablet   No No   Sig: Take 1 tablet (50 mg) by mouth At Bedtime      Facility-Administered Medications: None     Allergies   Allergies   Allergen Reactions     Contrast [Iohexol] Rash     Noticed during 08/2020 admission. Received IV contrast on 8/5     Chocolate Headache     Penicillins Rash       Physical Exam   Vital Signs: Temp: 98.3  F (36.8  C) Temp src: Oral BP: 138/66 Pulse: 98   Resp: 24 SpO2: 97 % O2 Device: None (Room air)    Weight: 120 lbs 0 oz    Constitutional: fatigued, alert and appears chronically ill  Respiratory: no increased work of breathing, good air exchange and rhonchi diffuse  Cardiovascular: regular rate and rhythm and normal S1 and S2  GI: normal bowel sounds, soft and non-tender  Skin: no bruising or bleeding and no redness, warmth, or swelling  Musculoskeletal: no lower extremity pitting edema present  full range of motion noted  Neurologic: Mental Status Exam:  Level of Alertness:   awake  Motor Exam:  moves all extremities well and symmetrically  Neuropsychiatric: General: normal and calm    Data   Data reviewed today: I reviewed all medications, new labs and imaging results over the last 24 hours. I personally reviewed no images or EKG's today.    Recent Labs   Lab 07/19/22  1901   WBC 32.5*   HGB 11.7   MCV 72*   *      POTASSIUM 3.4*   CHLORIDE 99   CO2 23   BUN 28   CR 1.11*   ANIONGAP 14   ADY 10.1   *   ALBUMIN 3.6   PROTTOTAL 8.9*   BILITOTAL 0.5   ALKPHOS 134*   ALT 31   AST 25   LIPASE <9     Recent Results (from the past 24 hour(s))   CT Abdomen Pelvis w/o Contrast    Narrative    EXAM: CT ABDOMEN PELVIS W/O CONTRAST  LOCATION: [facility]  DATE/TIME: 7/19/2022 8:24 PM    INDICATION: Left lower quadrant pain. Fever. Diarrhea.  COMPARISON: CT of the abdomen and  pelvis performed 08/08/2020.  TECHNIQUE: CT scan of the abdomen and pelvis was performed without IV contrast. Multiplanar reformats were obtained. Dose reduction techniques were used.  CONTRAST: None.    FINDINGS:   LOWER CHEST: Patchy consolidation and infiltrate at both lung bases posteriorly, greater on the left, is new since the previous exam. Coronary artery calcification.    HEPATOBILIARY: Normal.    PANCREAS: Normal.    SPLEEN: Normal.    ADRENAL GLANDS: Normal.    KIDNEYS/BLADDER: No significant mass, stone, or hydronephrosis.    BOWEL: No obstruction or inflammatory change.    LYMPH NODES: No lymphadenopathy.    VASCULATURE: Moderate atherosclerotic aortoiliac calcification.    PELVIC ORGANS: Hysterectomy.    MUSCULOSKELETAL: Degenerative changes in the lower lumbar spine.      Impression    IMPRESSION:   1.  Patchy consolidation and infiltrate at both lung bases are suspicious for pneumonia.  2.  No acute abnormality in the abdomen or pelvis.     Chest XR,  PA & LAT    Narrative    EXAM: XR CHEST 2 VW  LOCATION: [facility]  DATE/TIME: 7/19/2022 8:30 PM    INDICATION: fever  COMPARISON: 02/08/2022      Impression    IMPRESSION: Lungs are clear without consolidation or effusion. Cardiac and mediastinal silhouettes and osseous structures appear unchanged.

## 2022-07-20 NOTE — ED NOTES
"[facility] ED Handoff Report    ED Chief Complaint: Nausea, Vomiting and Diarrhea.    ED Diagnosis:  (R91.8) Lung infiltrate  Comment:   Plan:     (U07.1) Infection due to 2019 novel coronavirus  Comment:   Plan:     (A41.9) Sepsis, due to unspecified organism, unspecified whether acute organ dysfunction present (H)  Comment:   Plan:        PMH:    Past Medical History:   Diagnosis Date     High cholesterol      Migraines      Sepsis (H) 8/10/2020        Code Status:  Full Code     Falls Risk: Yes Band: Applied    Current Living Situation/Residence: lives in a house with grandson.    Elimination Status: Continent: Yes; patient reports she is continent, but patient is pleasantly confused so writer unaware of her baseline.     Activity Level: SBA; patient denies using adaptive equipment at home, but could benefit from PT evaluation.     Patients Preferred Language:  English     Needed: No    Vital Signs:  BP (!) 148/74   Pulse 97   Temp 98  F (36.7  C) (Oral)   Resp 24   Ht 1.651 m (5' 5\")   Wt 54.4 kg (120 lb)   SpO2 98%   BMI 19.97 kg/m       Cardiac Rhythm: Sinus Tachycardia.    Pain Score: 5/10    Is the Patient Confused:  Yes    Last Food or Drink: 07/20/22 at Swallow study.     Focused Assessment:  Patient arrives to ED with chief complaint of nausea, vomiting and diarrhea for ten plus days.  Patient reported having a fall as well.  Patient having difficulty urinating and was placed on a bladder scan protocol.  Last bladder scan was at 1526.  Stool and sputum sample still needed.  Was evaluated by speech today due to aspiration pneumonia; see note and diet order.  ABRIL Owens informed writer patient has been having hallucinations and has chronic generalized pain.     Tests Performed: Done: Labs and Imaging    Treatments Provided:  See MAR.    Family Dynamics/Concerns: No    Family Updated On Visitor Policy: Yes    Plan of Care Communicated to Family: Yes    Who Was Updated about Plan of " Care: Patient's daughter, Alissa.    Belongings Checklist Done and Signed by Patient: Yes    Medications sent with patient:     Covid: symptomatic, positive    Additional Information:     RN: Silvia Pandya RN 7/20/2022 4:19 PM

## 2022-07-20 NOTE — PROGRESS NOTES
Speech-Language Pathology: Video Swallow Study     07/20/22 1226   General Information   Onset of Illness/Injury or Date of Surgery 07/19/22   Pertinent History of Current Problem Julisa Chaney is a 77 year old female who was brought to the ED for evaluation of above chief complaint.  Past medical history of trigeminal neuralgia, chronic pain, migraines, hyperlipidemia, hypercalcemia, iron deficiency anemia, depression, pyloric ulcer.  She has felt unwell over the last 5 days with watery nonbloody diarrhea.  She has felt lightheaded and today fell on a pillow but denied head trauma or loss of consciousness.  She also complained of generalized body aches but denied chest pain or palpitations.  She has had an occasional cough especially when she eats.  She has not taken her medications consistently over the last 5 days.  She also reported some fevers and upon ED arrival temperature was 100.8  F and heart rate 125.  Work-up was consistent with COVID-pneumonia and severe sepsis.  Patient has been vaccinated and recently boosted about a month ago.  She lives at home with her son, denies tobacco, alcohol or drugs.   General Observations Alert and talkative   General Swallowing Observations   Swallowing Evaluation Videofluoroscopic swallow study (VFSS)   VFSS Evaluation   Radiologist Dr. Baltazar   Views Taken left lateral   Physical Location of Procedure [facility]   VFSS Textures Trialed thin liquids;mildly thick liquids   VFSS Eval: Thin Liquid Texture Trial   Mode of Presentation, Thin Liquid self-fed;cup   Order of Presentation 1,2   Preparatory Phase Poor bolus control   Oral Phase, Thin Liquid Premature pharyngeal entry   Pharyngeal Phase, Thin Liquid Delayed swallow reflex   Rosenbek's Penetration Aspiration Scale: Thin Liquid Trial Results 8 - contrast passes glottis, visible subglottic residue remains, absent patient response (aspiration)   Response to Aspiration absent response, silent aspiration   VFSS  Eval: Mildly Thick Liquids   Mode of Presentation cup;self-fed   Order of Presentation 3   Preparatory Phase WFL   Oral Phase WFL   Pharyngeal Phase WFL   Rosenbek's Penetration Aspiration Scale 1 - no aspiration, contrast does not enter airway   Swallowing Recommendations   Diet Consistency Recommendations mildly thick liquids (level 2);minced & moist (level 5)   Supervision Level for Intake patient independent   Medication Administration Recommendations, Swallowing (SLP) whole in applesauce   Comment, Swallowing Recommendations Videofluoroscopic Swallow Study completed. Patient had silent aspiration with thin liquids with continuous drinking. It was caused by pre-spill into pharyngeal space and mildly delayed swallow. Only trace amount was penetrated and then with continuous swallows, there was silent aspiration. In the past pt was able to use chin tuck strategy to prevent aspiration, currently pt has neck pain and is not able to complete this strategy. No penetration or aspiration noted with mildly thick liquids.  Oral motor function/oral phase was mildly impaired(baseline). Tongue base retraction was intact. Swallow response was mildly delayed with thin and epiglottic inversion was complete.  Trace stasis occurred with thin and mildly thick that did not accumulate as the assessment progressed . Hyolaryngeal elevation was functional and hyolaryngeal excursion was functional. Recommend diet of minced and moist with mildly thick liquids.   General Therapy Interventions   Planned Therapy Interventions Dysphagia Treatment   Dysphagia treatment Compensatory strategies for swallowing   Intervention Comments chin tuck   Clinical Impression   Criteria for Skilled Therapeutic Interventions Met (SLP Eval) Yes, treatment indicated   SLP Diagnosis dysphagia   Clinical Impression Comments silent aspiration wtih thin, recommend minced and moist diet wtih mildly thick liquids   SLP Discharge Planning   SLP Discharge  Recommendation home with assist   SLP Rationale for DC Rec below baseline for swallow   SLP Brief overview of current status  VFSS 7/20/22 showed continued silent asp with thin    Total Evaluation Time   Total Evaluation Time (Minutes) 20

## 2022-07-20 NOTE — CONSULTS
"Care Management Initial Consult    General Information  Assessment completed with: Alissa Thorpe daughter via phone  Type of CM/SW Visit: Initial Assessment    Primary Care Provider verified and updated as needed: Yes   Readmission within the last 30 days: no previous admission in last 30 days      Reason for Consult: discharge planning  Advance Care Planning: Advance Care Planning Reviewed: no concerns identified          Communication Assessment  Patient's communication style: spoken language (English or Bilingual)                       Living Environment:   People in home: grandchild(geetha)  Julisa, adult grandson Johnathon  Current living Arrangements: house      Able to return to prior arrangements: yes       Family/Social Support:  Care provided by: self  Provides care for: no one, unable/limited ability to care for self     Children, Other (specify) (\"grandson\")          Description of Support System:      Support Assessment: Adequate family and caregiver support, Adequate social supports, Patient communicates needs well met    Current Resources:   Patient receiving home care services: No     Community Resources: None  Equipment currently used at home: grab bar, tub/shower, shower chair  Supplies currently used at home: None    Employment/Financial:  Employment Status: retired        Financial Concerns:     Referral to Financial Worker: No       Lifestyle & Psychosocial Needs:  Social Determinants of Health     Tobacco Use: Medium Risk     Smoking Tobacco Use: Former Smoker     Smokeless Tobacco Use: Never Used   Alcohol Use: Not on file   Financial Resource Strain: Not on file   Food Insecurity: Not on file   Transportation Needs: Not on file   Physical Activity: Not on file   Stress: Not on file   Social Connections: Not on file   Intimate Partner Violence: Not on file   Depression: At risk     PHQ-2 Score: 5   Housing Stability: Not on file       Functional Status:  Prior to admission patient needed " assistance:   Dependent ADLs:: Ambulation-no assistive device, Independent  Dependent IADLs:: Cleaning, Transportation  Assesssment of Functional Status: Not at baseline with ADL Functioning, Not at baseline with mobility, Not at  functional baseline    Mental Health Status:  Mental Health Status: Past Concern  Mental Health Management: Medication    Chemical Dependency Status:                Values/Beliefs:  Spiritual, Cultural Beliefs, Sabianist Practices, Values that affect care:                 Additional Information:  Julisa was found to be COVID +. She lives in a house with her adult grandson.     She is independent with ADLs at baseline. Family helps with housekeeping and transportation.    Unknown discharge needs at this time. In the past she has had [facility] services.    Family to transport at discharge.    Alissa daughter [phone].    Ning Dougherty RN

## 2022-07-20 NOTE — PROVIDER NOTIFICATION
Texted Dr Dukes:     Would you want her to have a BP med ordered as scheduled or as needed? Pretty consistant HTN

## 2022-07-21 ENCOUNTER — APPOINTMENT (OUTPATIENT)
Dept: CARDIOLOGY | Facility: HOSPITAL | Age: 77
DRG: 871 | End: 2022-07-21
Attending: INTERNAL MEDICINE
Payer: COMMERCIAL

## 2022-07-21 ENCOUNTER — APPOINTMENT (OUTPATIENT)
Dept: SPEECH THERAPY | Facility: HOSPITAL | Age: 77
DRG: 871 | End: 2022-07-21
Payer: COMMERCIAL

## 2022-07-21 LAB
ALBUMIN SERPL-MCNC: 2.7 G/DL (ref 3.5–5)
ALP SERPL-CCNC: 90 U/L (ref 45–120)
ALT SERPL W P-5'-P-CCNC: 22 U/L (ref 0–45)
ANION GAP SERPL CALCULATED.3IONS-SCNC: 10 MMOL/L (ref 5–18)
AST SERPL W P-5'-P-CCNC: 19 U/L (ref 0–40)
BASE EXCESS BLDA CALC-SCNC: 0.9 MMOL/L
BILIRUB DIRECT SERPL-MCNC: 0.1 MG/DL
BILIRUB SERPL-MCNC: 0.3 MG/DL (ref 0–1)
BUN SERPL-MCNC: 17 MG/DL (ref 8–28)
C REACTIVE PROTEIN LHE: 28 MG/DL (ref 0–?)
CALCIUM SERPL-MCNC: 9 MG/DL (ref 8.5–10.5)
CHLORIDE BLD-SCNC: 108 MMOL/L (ref 98–107)
CO2 SERPL-SCNC: 24 MMOL/L (ref 22–31)
COHGB MFR BLD: 96.1 % (ref 95–96)
CREAT SERPL-MCNC: 0.69 MG/DL (ref 0.6–1.1)
D DIMER PPP FEU-MCNC: 1.91 UG/ML FEU (ref 0–0.5)
ERYTHROCYTE [DISTWIDTH] IN BLOOD BY AUTOMATED COUNT: 18.6 % (ref 10–15)
GFR SERPL CREATININE-BSD FRML MDRD: 89 ML/MIN/1.73M2
GLUCOSE BLD-MCNC: 100 MG/DL (ref 70–125)
HCO3 BLD-SCNC: 25 MMOL/L (ref 23–29)
HCT VFR BLD AUTO: 35.4 % (ref 35–47)
HGB BLD-MCNC: 10.2 G/DL (ref 11.7–15.7)
LVEF ECHO: NORMAL
MCH RBC QN AUTO: 20.8 PG (ref 26.5–33)
MCHC RBC AUTO-ENTMCNC: 28.8 G/DL (ref 31.5–36.5)
MCV RBC AUTO: 72 FL (ref 78–100)
O2/TOTAL GAS SETTING VFR VENT: 21 %
OXYHGB MFR BLD: 94.8 % (ref 95–96)
PCO2 BLD: 33 MM HG (ref 35–45)
PH BLD: 7.47 [PH] (ref 7.37–7.44)
PLATELET # BLD AUTO: 475 10E3/UL (ref 150–450)
PO2 BLD: 73 MM HG (ref 75–85)
POTASSIUM BLD-SCNC: 3.3 MMOL/L (ref 3.5–5)
POTASSIUM BLD-SCNC: 3.4 MMOL/L (ref 3.5–5)
POTASSIUM BLD-SCNC: 4.3 MMOL/L (ref 3.5–5)
PROT SERPL-MCNC: 7 G/DL (ref 6–8)
RBC # BLD AUTO: 4.9 10E6/UL (ref 3.8–5.2)
SODIUM SERPL-SCNC: 142 MMOL/L (ref 136–145)
TEMPERATURE: 37 DEGREES C
WBC # BLD AUTO: 19.7 10E3/UL (ref 4–11)

## 2022-07-21 PROCEDURE — 86140 C-REACTIVE PROTEIN: CPT | Performed by: HOSPITALIST

## 2022-07-21 PROCEDURE — 258N000003 HC RX IP 258 OP 636: Performed by: HOSPITALIST

## 2022-07-21 PROCEDURE — 258N000003 HC RX IP 258 OP 636: Performed by: INTERNAL MEDICINE

## 2022-07-21 PROCEDURE — 250N000011 HC RX IP 250 OP 636: Performed by: EMERGENCY MEDICINE

## 2022-07-21 PROCEDURE — 93306 TTE W/DOPPLER COMPLETE: CPT | Mod: 26 | Performed by: INTERNAL MEDICINE

## 2022-07-21 PROCEDURE — 250N000013 HC RX MED GY IP 250 OP 250 PS 637: Performed by: HOSPITALIST

## 2022-07-21 PROCEDURE — 258N000003 HC RX IP 258 OP 636: Performed by: EMERGENCY MEDICINE

## 2022-07-21 PROCEDURE — 99233 SBSQ HOSP IP/OBS HIGH 50: CPT | Performed by: INTERNAL MEDICINE

## 2022-07-21 PROCEDURE — 255N000002 HC RX 255 OP 636: Performed by: INTERNAL MEDICINE

## 2022-07-21 PROCEDURE — 36415 COLL VENOUS BLD VENIPUNCTURE: CPT | Performed by: HOSPITALIST

## 2022-07-21 PROCEDURE — 999N000157 HC STATISTIC RCP TIME EA 10 MIN

## 2022-07-21 PROCEDURE — 82248 BILIRUBIN DIRECT: CPT | Performed by: INTERNAL MEDICINE

## 2022-07-21 PROCEDURE — 250N000011 HC RX IP 250 OP 636: Performed by: HOSPITALIST

## 2022-07-21 PROCEDURE — 999N000208 ECHOCARDIOGRAM COMPLETE

## 2022-07-21 PROCEDURE — 250N000013 HC RX MED GY IP 250 OP 250 PS 637: Performed by: INTERNAL MEDICINE

## 2022-07-21 PROCEDURE — 82805 BLOOD GASES W/O2 SATURATION: CPT | Performed by: INTERNAL MEDICINE

## 2022-07-21 PROCEDURE — 85379 FIBRIN DEGRADATION QUANT: CPT | Performed by: HOSPITALIST

## 2022-07-21 PROCEDURE — 120N000001 HC R&B MED SURG/OB

## 2022-07-21 PROCEDURE — 84132 ASSAY OF SERUM POTASSIUM: CPT | Performed by: INTERNAL MEDICINE

## 2022-07-21 PROCEDURE — 36600 WITHDRAWAL OF ARTERIAL BLOOD: CPT

## 2022-07-21 PROCEDURE — 36415 COLL VENOUS BLD VENIPUNCTURE: CPT | Performed by: INTERNAL MEDICINE

## 2022-07-21 PROCEDURE — 92526 ORAL FUNCTION THERAPY: CPT | Mod: GN | Performed by: REHABILITATION PRACTITIONER

## 2022-07-21 PROCEDURE — 85018 HEMOGLOBIN: CPT | Performed by: HOSPITALIST

## 2022-07-21 PROCEDURE — 80053 COMPREHEN METABOLIC PANEL: CPT | Performed by: HOSPITALIST

## 2022-07-21 RX ORDER — NALOXONE HYDROCHLORIDE 0.4 MG/ML
0.4 INJECTION, SOLUTION INTRAMUSCULAR; INTRAVENOUS; SUBCUTANEOUS
Status: DISCONTINUED | OUTPATIENT
Start: 2022-07-21 | End: 2022-07-27 | Stop reason: HOSPADM

## 2022-07-21 RX ORDER — POTASSIUM CHLORIDE 1.5 G/1.58G
40 POWDER, FOR SOLUTION ORAL ONCE
Status: COMPLETED | OUTPATIENT
Start: 2022-07-21 | End: 2022-07-21

## 2022-07-21 RX ORDER — POTASSIUM CHLORIDE 1500 MG/1
40 TABLET, EXTENDED RELEASE ORAL ONCE
Status: COMPLETED | OUTPATIENT
Start: 2022-07-21 | End: 2022-07-21

## 2022-07-21 RX ORDER — SODIUM CHLORIDE, SODIUM LACTATE, POTASSIUM CHLORIDE, CALCIUM CHLORIDE 600; 310; 30; 20 MG/100ML; MG/100ML; MG/100ML; MG/100ML
INJECTION, SOLUTION INTRAVENOUS CONTINUOUS
Status: ACTIVE | OUTPATIENT
Start: 2022-07-21 | End: 2022-07-23

## 2022-07-21 RX ORDER — NALOXONE HYDROCHLORIDE 0.4 MG/ML
0.2 INJECTION, SOLUTION INTRAMUSCULAR; INTRAVENOUS; SUBCUTANEOUS
Status: DISCONTINUED | OUTPATIENT
Start: 2022-07-21 | End: 2022-07-27 | Stop reason: HOSPADM

## 2022-07-21 RX ADMIN — NORTRIPTYLINE HYDROCHLORIDE 50 MG: 50 CAPSULE ORAL at 20:34

## 2022-07-21 RX ADMIN — POTASSIUM CHLORIDE 40 MEQ: 1.5 POWDER, FOR SOLUTION ORAL at 21:12

## 2022-07-21 RX ADMIN — VANCOMYCIN HYDROCHLORIDE 750 MG: 1 INJECTION, POWDER, LYOPHILIZED, FOR SOLUTION INTRAVENOUS at 06:28

## 2022-07-21 RX ADMIN — IPRATROPIUM BROMIDE AND ALBUTEROL 2 PUFF: 20; 100 SPRAY, METERED RESPIRATORY (INHALATION) at 11:00

## 2022-07-21 RX ADMIN — VANCOMYCIN HYDROCHLORIDE 750 MG: 1 INJECTION, POWDER, LYOPHILIZED, FOR SOLUTION INTRAVENOUS at 17:19

## 2022-07-21 RX ADMIN — REMDESIVIR 100 MG: 100 INJECTION, POWDER, LYOPHILIZED, FOR SOLUTION INTRAVENOUS at 20:34

## 2022-07-21 RX ADMIN — TOPIRAMATE 50 MG: 25 TABLET, FILM COATED ORAL at 21:01

## 2022-07-21 RX ADMIN — MEROPENEM 1 G: 1 INJECTION, POWDER, FOR SOLUTION INTRAVENOUS at 02:22

## 2022-07-21 RX ADMIN — ACETAMINOPHEN 650 MG: 325 TABLET ORAL at 13:24

## 2022-07-21 RX ADMIN — IPRATROPIUM BROMIDE AND ALBUTEROL 2 PUFF: 20; 100 SPRAY, METERED RESPIRATORY (INHALATION) at 20:33

## 2022-07-21 RX ADMIN — ENOXAPARIN SODIUM 40 MG: 40 INJECTION SUBCUTANEOUS at 06:28

## 2022-07-21 RX ADMIN — MEROPENEM 1 G: 1 INJECTION, POWDER, FOR SOLUTION INTRAVENOUS at 13:08

## 2022-07-21 RX ADMIN — POTASSIUM CHLORIDE 40 MEQ: 1500 TABLET, EXTENDED RELEASE ORAL at 10:58

## 2022-07-21 RX ADMIN — NORTRIPTYLINE HYDROCHLORIDE 50 MG: 50 CAPSULE ORAL at 08:59

## 2022-07-21 RX ADMIN — IPRATROPIUM BROMIDE AND ALBUTEROL 2 PUFF: 20; 100 SPRAY, METERED RESPIRATORY (INHALATION) at 17:15

## 2022-07-21 RX ADMIN — IPRATROPIUM BROMIDE AND ALBUTEROL 2 PUFF: 20; 100 SPRAY, METERED RESPIRATORY (INHALATION) at 04:50

## 2022-07-21 RX ADMIN — PANTOPRAZOLE SODIUM 40 MG: 20 TABLET, DELAYED RELEASE ORAL at 17:17

## 2022-07-21 RX ADMIN — PANTOPRAZOLE SODIUM 40 MG: 20 TABLET, DELAYED RELEASE ORAL at 06:35

## 2022-07-21 RX ADMIN — OXCARBAZEPINE 450 MG: 150 TABLET, FILM COATED ORAL at 21:02

## 2022-07-21 RX ADMIN — IPRATROPIUM BROMIDE AND ALBUTEROL 2 PUFF: 20; 100 SPRAY, METERED RESPIRATORY (INHALATION) at 08:59

## 2022-07-21 RX ADMIN — LEVETIRACETAM 500 MG: 500 TABLET ORAL at 20:34

## 2022-07-21 RX ADMIN — LEVETIRACETAM 500 MG: 500 TABLET ORAL at 08:59

## 2022-07-21 RX ADMIN — SODIUM CHLORIDE, POTASSIUM CHLORIDE, SODIUM LACTATE AND CALCIUM CHLORIDE: 600; 310; 30; 20 INJECTION, SOLUTION INTRAVENOUS at 08:59

## 2022-07-21 RX ADMIN — PERFLUTREN 3 ML: 6.52 INJECTION, SUSPENSION INTRAVENOUS at 15:52

## 2022-07-21 ASSESSMENT — ACTIVITIES OF DAILY LIVING (ADL)
ADLS_ACUITY_SCORE: 29
ADLS_ACUITY_SCORE: 23
ADLS_ACUITY_SCORE: 29
ADLS_ACUITY_SCORE: 23
ADLS_ACUITY_SCORE: 29

## 2022-07-21 NOTE — PLAN OF CARE
Problem: Plan of Care - These are the overarching goals to be used throughout the patient stay.    Goal: Plan of Care Review/Shift Note  Description: The Plan of Care Review/Shift note should be completed every shift.  The Outcome Evaluation is a brief statement about your assessment that the patient is improving, declining, or no change.  This information will be displayed automatically on your shift note.  7/21/2022 0817 by Maxine Hernandez RN  Outcome: Ongoing, Progressing     Problem: Risk for Delirium  Goal: Optimal Coping  Outcome: Ongoing, Progressing     Problem: Risk for Delirium  Goal: Improved Behavioral Control  Outcome: Ongoing, Progressing     Problem: Respiratory Compromise (Pneumonia)  Goal: Effective Oxygenation and Ventilation  Outcome: Ongoing, Progressing  Intervention: Promote Airway Secretion Clearance  Recent Flowsheet Documentation  Taken 7/21/2022 0005 by Maxine Hernandez RN  Cough And Deep Breathing: done with encouragement  Taken 7/20/2022 1945 by Maxine Hernandez RN  Cough And Deep Breathing: done with encouragement  Intervention: Optimize Oxygenation and Ventilation  Recent Flowsheet Documentation  Taken 7/21/2022 0005 by Maxine Hernandez, RN  Head of Bed (HOB) Positioning: HOB at 30-45 degrees  Taken 7/20/2022 1945 by Maxine Hernandez RN  Head of Bed (HOB) Positioning: HOB at 30-45 degrees   Goal Outcome Evaluation:      Patient alert, orientation fluctuates, room air, able to take her pills PO.   Bladder scan 483 ml, 2 previous intermittent straight caths,  narayan in place per orders. Tele monitor Sinus Tachy.

## 2022-07-21 NOTE — SIGNIFICANT EVENT
Patient arrived on P1 at 1800. Patient correctly answered orientation questions correctly, but was somewhat confused on situation. Patient noted to be fidgeting a lot, trying to get her bracelet off, pulling at her oximeter probe. Sats % when still, but with movement the probe did not seem to be working properly and would register desats to 80's. Next RN aware and will continue to monitor.

## 2022-07-21 NOTE — PLAN OF CARE
Patient is weak and lethargic, sleepy but arousable. Had fever, received tylenol. She is on antibiotics ate IVF and potasium monitor. Patient. Lungs sound diminished. Patient is on room air. Continuous pulse oximetry. Patient is on telemetry. Sinus tachy  to NSR. Monitor. Patient has very poor appetite. Encourage oral intake.   Problem: Fluid Imbalance (Pneumonia)  Goal: Fluid Balance  Outcome: Ongoing, Progressing   Problem: Respiratory Compromise (Pneumonia)  Goal: Effective Oxygenation and Ventilation  Outcome: Ongoing, Progressing  Intervention: Promote Airway Secretion Clearance    Problem: Pain Chronic (Persistent) (Comorbidity Management)  Goal: Acceptable Pain Control and Functional Ability  Outcome: Ongoing, Progressing

## 2022-07-21 NOTE — PROGRESS NOTES
[facility]    PROGRESS NOTE - Hospitalist Service    ASSESSMENT AND PLAN     Principal Problem:    Infection due to 2019 novel coronavirus  Active Problems:    Sepsis (H)    Sepsis, due to unspecified organism, unspecified whether acute organ dysfunction present (H)    ORTIZ (acute kidney injury) (H)    Julisa Chaney is a 77 year old female admitted on 7/19/2022. She came to the ED for evaluation of diarrhea, fevers, generalized weakness, lightheadedness, fall.     Confirmed COVID-19 infection                Viral Pneumonia secondary to COVID-19 infection              Bacterial Superinfection              Severe sepsis due to Pneumonia- concern for aspiration PNA               Fully vaccinated with booster              Inhalers as needed              Fluid hydration 125 mL/h-monitor for fluid overload              Meropenem and vancomycin initiated 7/19.  MRSA nasal screen positive              Remdesivir initiated 7/19              No dexamethasone-patient is not hypoxic              Lovenox 40mg daily   Check ECHO. Previous EF of 70-75%     New onset delirium and encephalopathy secondary to above              Encephalopathy order set              Should hopefully improve with resolution of sepsis     Diarrhea              CT scan showed no acute abnormality in the abdomen or pelvis              Likely related to COVID infection                 Dysphagia              Reports occasional coughing with meals              Suspected aspiration in February 2022, video swallow at that time showed trace amount of aspiration with thin barium consistency              NPO              Video swallow 7/20 with some aspiration with thin liquids   Speech recommending minced and moist diet level 5 and mildly thick liquid     Acute urinary retention    Likely related to acute illness and delirium               Abrams catheter placed 7/21      Acute kidney injury- resolved with IVF               Secondary to  volume depletion              Continue IV hydration               Monitor for volume overload               Avoid nephrotoxins including NSAIDs     Hypokalemia              Secondary to diarrhea              Replace per protocol     Hyperglycemia- improved without insulin               Continue to monitor              A1c of 5.6      Trigeminal neuralgia, chronic pain              Home meds resumed      Depression              Did not resume mirtazapine as patient reportedly stopped taking this medication due to somnolence               Resumed home nortriptyline      Iron deficiency anemia   Stable hemoglobin      Barriers to discharge: Clinical improvement, IV medications    Anticipated length of stay: Several days    Patient does appear quite unwell on my physical exam.  Checked ABGs which revealed pH of 7.47, CO2 of 33, O2 of 73 and bicarb of 25.  She does not require higher acuity of care at this time and can remain on P1.  Blood pressure has remained stable.  She is spiking fevers but is on vancomycin and meropenem.  Hopefully patient's mentation and clinical appearance will improve as she is treated with antibiotics.     Concern for clinical decompensation despite stability of vitals and close to normal appearing ABG.    Had previously discussed case with patient's daughter Alissa on 7/20.  Daughter had mentioned that patient had pneumonia several years ago and had delirium and was very sick at that time as well.    Present on Admission:    Sepsis (H)    Sepsis, due to unspecified organism, unspecified whether acute organ dysfunction present (H)    Infection due to 2019 novel coronavirus    ORTIZ (acute kidney injury) (H)      Subjective:  Patient appears sick on exam.  Responding to verbal stimuli but is not as alert as she was on 7/19.  Denies any current pain or shortness of breath.  Appears weak and tired.    PHYSICAL EXAM  Temp:  [98.5  F (36.9  C)-100.5  F (38.1  C)] 100.5  F (38.1  C)  Pulse:  []  110  Resp:  [18-24] 24  BP: (117-157)/(63-88) 142/68  SpO2:  [96 %-99 %] 96 %  Wt Readings from Last 1 Encounters:   07/19/22 54.4 kg (120 lb)       Intake/Output Summary (Last 24 hours) at 7/21/2022 1500  Last data filed at 7/21/2022 1321  Gross per 24 hour   Intake 1346.75 ml   Output 1650 ml   Net -303.25 ml      Body mass index is 19.97 kg/m .    GENRL: Alert and answering questions appropriately. Not in acute distress. Lying in bed   HEENT: no JVP elevation, no lymphadenopathy or thyromegaly  CHEST: Diminished throughout.  No wheezes noted.    HEART: Tachycardic.  No murmur auscultated.    ABDMN: Soft. Non-tender, non-distended. No organomegaly. No guarding or rigidity. Bowel sounds present   EXTRM: No pedal edema, DP pulses 2+.   NEURO: Cranial nerves II-XII grossly intact. No focal neurological deficit. No involuntary movements.   PSYCH: flat affect and mood.   INTGM: No skin rash, no cyanosis or clubbing    PERTINENT LABS/IMAGING:  Results for orders placed or performed during the hospital encounter of 07/19/22   CT Abdomen Pelvis w/o Contrast    Impression    IMPRESSION:   1.  Patchy consolidation and infiltrate at both lung bases are suspicious for pneumonia.  2.  No acute abnormality in the abdomen or pelvis.     Chest XR,  PA & LAT    Impression    IMPRESSION: Lungs are clear without consolidation or effusion. Cardiac and mediastinal silhouettes and osseous structures appear unchanged.     Most Recent 3 CBC's:Recent Labs   Lab Test 07/21/22  0931 07/20/22  0923 07/19/22  1901   WBC 19.7* 26.2* 32.5*   HGB 10.2* 9.3* 11.7   MCV 72* 72* 72*   * 472* 647*     Most Recent ABG:Recent Labs   Lab Test 07/21/22  1409   PH 7.47*   PO2 73*   PCO2 33*   HCO3 25   ALEX 0.9       Recent Labs   Lab Test 05/19/22  1430   CHOL 247*   HDL 77   *   TRIG 105     Recent Labs   Lab Test 05/19/22  1430 05/04/21  1205 05/10/18  1225   * 114 125     Recent Labs   Lab Test 07/21/22  0931      POTASSIUM  3.3*   CHLORIDE 108*   CO2 24      BUN 17   CR 0.69   GFRESTIMATED 89   ADY 9.0     Recent Labs   Lab Test 07/19/22  1901   A1C 5.6     Recent Labs   Lab Test 07/21/22  0931 07/20/22  0923 07/19/22 1901   HGB 10.2* 9.3* 11.7     Recent Labs   Lab Test 07/19/22  1901 02/07/22  0011 02/06/19 2125   TROPONINI 0.03 <0.01 0.05     No results for input(s): BNP, NTBNPI, NTBNP in the last 92607 hours.  Recent Labs   Lab Test 07/19/22  1901   TSH 2.44     Recent Labs   Lab Test 07/19/22  2255 02/07/22  0012 02/06/19 2125   INR 1.36* 1.05 0.97       Ashwini Dukes DO  Hospitalist Service  [facility]  Text page via [employer] Paging/Directory

## 2022-07-21 NOTE — PROGRESS NOTES
Patient is alert and oriented to person. Denies pain. Lungs are diminished. Patient is on room air, O2Sat 95%  continuous pulse oximetry. Patient is very weak. Her oral intake is poor and patient had just a few bites of her breakfast and decline to order lunch. Advised to encourage oral intake. Patient is on antibiotics and IVF-tolerating well. Has narayan catheter. Adequate out put. Discussed observation with MD. ABGs ordered and increase IVF to 125 mi/hr .  Monitor.

## 2022-07-21 NOTE — PROVIDER NOTIFICATION
ID lab called that patient nasal swap is growing staphylococci aureus-MRSA. Patient is on contact,enteric and special precaution.  Dr. Dukes notify.

## 2022-07-21 NOTE — PROGRESS NOTES
Care Management Follow Up    Length of Stay (days): 2    Expected Discharge Date: 07/24/2022     Concerns to be Addressed: covid care        Patient plan of care discussed at interdisciplinary rounds: Yes    Anticipated Discharge Disposition:  TBD     Anticipated Discharge Services:  TBD  Anticipated Discharge DME:  TBD       Additional Information:  Covid positive 7/19. Covid care, narayan catheter placed. IV antibiotics x2.       Social history per initial consult:  Patient lives in a house with her adult grandson.   She is independent with ADLs at baseline. Family helps with housekeeping and transportation.  Unknown discharge needs at this time. In the past she has had [facility] services.  Family to transport at discharge.    Final discharge plan pending progression and recommendations.     Sara Navarrete RN

## 2022-07-22 ENCOUNTER — APPOINTMENT (OUTPATIENT)
Dept: SPEECH THERAPY | Facility: HOSPITAL | Age: 77
DRG: 871 | End: 2022-07-22
Payer: COMMERCIAL

## 2022-07-22 LAB
ALBUMIN SERPL-MCNC: 2.4 G/DL (ref 3.5–5)
ALP SERPL-CCNC: 86 U/L (ref 45–120)
ALT SERPL W P-5'-P-CCNC: 31 U/L (ref 0–45)
ANION GAP SERPL CALCULATED.3IONS-SCNC: 9 MMOL/L (ref 5–18)
AST SERPL W P-5'-P-CCNC: 30 U/L (ref 0–40)
BACTERIA SPEC CULT: ABNORMAL
BACTERIA SPEC CULT: ABNORMAL
BILIRUB DIRECT SERPL-MCNC: 0.2 MG/DL
BILIRUB SERPL-MCNC: 0.3 MG/DL (ref 0–1)
BUN SERPL-MCNC: 19 MG/DL (ref 8–28)
C REACTIVE PROTEIN LHE: 20.9 MG/DL (ref 0–?)
CALCIUM SERPL-MCNC: 8.8 MG/DL (ref 8.5–10.5)
CHLORIDE BLD-SCNC: 110 MMOL/L (ref 98–107)
CO2 SERPL-SCNC: 23 MMOL/L (ref 22–31)
CREAT SERPL-MCNC: 0.64 MG/DL (ref 0.6–1.1)
D DIMER PPP FEU-MCNC: 1.22 UG/ML FEU (ref 0–0.5)
ERYTHROCYTE [DISTWIDTH] IN BLOOD BY AUTOMATED COUNT: 18.6 % (ref 10–15)
GFR SERPL CREATININE-BSD FRML MDRD: >90 ML/MIN/1.73M2
GLUCOSE BLD-MCNC: 117 MG/DL (ref 70–125)
HCT VFR BLD AUTO: 32.8 % (ref 35–47)
HGB BLD-MCNC: 9.3 G/DL (ref 11.7–15.7)
LEVETIRACETAM SERPL-MCNC: 8.7 ΜG/ML (ref 10–40)
MCH RBC QN AUTO: 20.7 PG (ref 26.5–33)
MCHC RBC AUTO-ENTMCNC: 28.4 G/DL (ref 31.5–36.5)
MCV RBC AUTO: 73 FL (ref 78–100)
PLATELET # BLD AUTO: 387 10E3/UL (ref 150–450)
POTASSIUM BLD-SCNC: 3.4 MMOL/L (ref 3.5–5)
POTASSIUM BLD-SCNC: 3.5 MMOL/L (ref 3.5–5)
PROT SERPL-MCNC: 6.5 G/DL (ref 6–8)
RBC # BLD AUTO: 4.5 10E6/UL (ref 3.8–5.2)
SODIUM SERPL-SCNC: 142 MMOL/L (ref 136–145)
VANCOMYCIN SERPL-MCNC: 7.8 MG/L
WBC # BLD AUTO: 17.3 10E3/UL (ref 4–11)

## 2022-07-22 PROCEDURE — 84132 ASSAY OF SERUM POTASSIUM: CPT | Performed by: INTERNAL MEDICINE

## 2022-07-22 PROCEDURE — 86140 C-REACTIVE PROTEIN: CPT | Performed by: HOSPITALIST

## 2022-07-22 PROCEDURE — 250N000009 HC RX 250: Performed by: HOSPITALIST

## 2022-07-22 PROCEDURE — 92526 ORAL FUNCTION THERAPY: CPT | Mod: GN

## 2022-07-22 PROCEDURE — 80201 ASSAY OF TOPIRAMATE: CPT | Performed by: INTERNAL MEDICINE

## 2022-07-22 PROCEDURE — 250N000013 HC RX MED GY IP 250 OP 250 PS 637: Performed by: INTERNAL MEDICINE

## 2022-07-22 PROCEDURE — 36415 COLL VENOUS BLD VENIPUNCTURE: CPT | Performed by: HOSPITALIST

## 2022-07-22 PROCEDURE — 80335 ANTIDEPRESSANT TRICYCLIC 1/2: CPT | Performed by: INTERNAL MEDICINE

## 2022-07-22 PROCEDURE — 82248 BILIRUBIN DIRECT: CPT | Performed by: INTERNAL MEDICINE

## 2022-07-22 PROCEDURE — 36569 INSJ PICC 5 YR+ W/O IMAGING: CPT

## 2022-07-22 PROCEDURE — 99223 1ST HOSP IP/OBS HIGH 75: CPT | Performed by: INTERNAL MEDICINE

## 2022-07-22 PROCEDURE — 99233 SBSQ HOSP IP/OBS HIGH 50: CPT | Performed by: INTERNAL MEDICINE

## 2022-07-22 PROCEDURE — 80183 DRUG SCRN QUANT OXCARBAZEPIN: CPT | Performed by: INTERNAL MEDICINE

## 2022-07-22 PROCEDURE — 85379 FIBRIN DEGRADATION QUANT: CPT | Performed by: HOSPITALIST

## 2022-07-22 PROCEDURE — 258N000003 HC RX IP 258 OP 636: Performed by: INTERNAL MEDICINE

## 2022-07-22 PROCEDURE — 80202 ASSAY OF VANCOMYCIN: CPT | Performed by: INTERNAL MEDICINE

## 2022-07-22 PROCEDURE — 250N000011 HC RX IP 250 OP 636: Performed by: INTERNAL MEDICINE

## 2022-07-22 PROCEDURE — 250N000013 HC RX MED GY IP 250 OP 250 PS 637: Performed by: HOSPITALIST

## 2022-07-22 PROCEDURE — 250N000011 HC RX IP 250 OP 636: Performed by: HOSPITALIST

## 2022-07-22 PROCEDURE — 80177 DRUG SCRN QUAN LEVETIRACETAM: CPT | Performed by: INTERNAL MEDICINE

## 2022-07-22 PROCEDURE — 272N000452 HC KIT SHRLOCK 5FR POWER PICC TRIPLE LUMEN

## 2022-07-22 PROCEDURE — 258N000003 HC RX IP 258 OP 636: Performed by: HOSPITALIST

## 2022-07-22 PROCEDURE — 85014 HEMATOCRIT: CPT | Performed by: HOSPITALIST

## 2022-07-22 PROCEDURE — 120N000001 HC R&B MED SURG/OB

## 2022-07-22 RX ORDER — MEROPENEM 1 G/1
1 INJECTION, POWDER, FOR SOLUTION INTRAVENOUS EVERY 12 HOURS
Status: DISCONTINUED | OUTPATIENT
Start: 2022-07-22 | End: 2022-07-22

## 2022-07-22 RX ORDER — MEROPENEM 1 G/1
1 INJECTION, POWDER, FOR SOLUTION INTRAVENOUS EVERY 8 HOURS
Status: DISCONTINUED | OUTPATIENT
Start: 2022-07-22 | End: 2022-07-26

## 2022-07-22 RX ORDER — LIDOCAINE 40 MG/G
CREAM TOPICAL
Status: ACTIVE | OUTPATIENT
Start: 2022-07-22 | End: 2022-07-25

## 2022-07-22 RX ORDER — POTASSIUM CHLORIDE 1.5 G/1.58G
20 POWDER, FOR SOLUTION ORAL ONCE
Status: COMPLETED | OUTPATIENT
Start: 2022-07-22 | End: 2022-07-22

## 2022-07-22 RX ORDER — POTASSIUM CHLORIDE 1.5 G/1.58G
40 POWDER, FOR SOLUTION ORAL ONCE
Status: COMPLETED | OUTPATIENT
Start: 2022-07-22 | End: 2022-07-22

## 2022-07-22 RX ADMIN — IPRATROPIUM BROMIDE AND ALBUTEROL 2 PUFF: 20; 100 SPRAY, METERED RESPIRATORY (INHALATION) at 19:44

## 2022-07-22 RX ADMIN — ACETAMINOPHEN 650 MG: 325 TABLET ORAL at 15:38

## 2022-07-22 RX ADMIN — POTASSIUM CHLORIDE 40 MEQ: 1.5 POWDER, FOR SOLUTION ORAL at 15:47

## 2022-07-22 RX ADMIN — PANTOPRAZOLE SODIUM 40 MG: 20 TABLET, DELAYED RELEASE ORAL at 06:32

## 2022-07-22 RX ADMIN — ACETAMINOPHEN 650 MG: 325 TABLET ORAL at 06:19

## 2022-07-22 RX ADMIN — MEROPENEM 1 G: 1 INJECTION, POWDER, FOR SOLUTION INTRAVENOUS at 16:00

## 2022-07-22 RX ADMIN — VANCOMYCIN HYDROCHLORIDE 750 MG: 1 INJECTION, POWDER, LYOPHILIZED, FOR SOLUTION INTRAVENOUS at 10:22

## 2022-07-22 RX ADMIN — VANCOMYCIN HYDROCHLORIDE 750 MG: 1 INJECTION, POWDER, LYOPHILIZED, FOR SOLUTION INTRAVENOUS at 17:11

## 2022-07-22 RX ADMIN — PANTOPRAZOLE SODIUM 40 MG: 20 TABLET, DELAYED RELEASE ORAL at 15:38

## 2022-07-22 RX ADMIN — IPRATROPIUM BROMIDE AND ALBUTEROL 2 PUFF: 20; 100 SPRAY, METERED RESPIRATORY (INHALATION) at 12:10

## 2022-07-22 RX ADMIN — POTASSIUM CHLORIDE 20 MEQ: 1.5 POWDER, FOR SOLUTION ORAL at 22:06

## 2022-07-22 RX ADMIN — IPRATROPIUM BROMIDE AND ALBUTEROL 2 PUFF: 20; 100 SPRAY, METERED RESPIRATORY (INHALATION) at 08:50

## 2022-07-22 RX ADMIN — SODIUM CHLORIDE, POTASSIUM CHLORIDE, SODIUM LACTATE AND CALCIUM CHLORIDE: 600; 310; 30; 20 INJECTION, SOLUTION INTRAVENOUS at 10:11

## 2022-07-22 RX ADMIN — LIDOCAINE HYDROCHLORIDE 1 ML: 10 INJECTION, SOLUTION EPIDURAL; INFILTRATION; INTRACAUDAL; PERINEURAL at 10:10

## 2022-07-22 RX ADMIN — ENOXAPARIN SODIUM 40 MG: 40 INJECTION SUBCUTANEOUS at 05:30

## 2022-07-22 RX ADMIN — IPRATROPIUM BROMIDE AND ALBUTEROL 2 PUFF: 20; 100 SPRAY, METERED RESPIRATORY (INHALATION) at 15:39

## 2022-07-22 ASSESSMENT — ACTIVITIES OF DAILY LIVING (ADL)
ADLS_ACUITY_SCORE: 29
ADLS_ACUITY_SCORE: 33
ADLS_ACUITY_SCORE: 29
ADLS_ACUITY_SCORE: 33
ADLS_ACUITY_SCORE: 29
ADLS_ACUITY_SCORE: 29
ADLS_ACUITY_SCORE: 33
ADLS_ACUITY_SCORE: 29
ADLS_ACUITY_SCORE: 33
ADLS_ACUITY_SCORE: 29

## 2022-07-22 NOTE — PROGRESS NOTES
[facility]    PROGRESS NOTE - Hospitalist Service    ASSESSMENT AND PLAN     Principal Problem:    Infection due to 2019 novel coronavirus  Active Problems:    Sepsis (H)    Sepsis, due to unspecified organism, unspecified whether acute organ dysfunction present (H)    ORTIZ (acute kidney injury) (H)    Julisa Chaney is a 77 year old female admitted on 7/19/2022. She came to the ED for evaluation of diarrhea, fevers, generalized weakness, lightheadedness, fall.     Confirmed COVID-19 infection                Viral Pneumonia secondary to COVID-19 infection              Bacterial Superinfection              Severe sepsis due to Pneumonia- concern for aspiration PNA               Fully vaccinated with booster              Inhalers as needed              Continue fluid hydration 100 mL/h-monitor for fluid overload              Meropenem and vancomycin initiated 7/19.  MRSA nasal screen positive              Remdesivir initiated 7/19              No dexamethasone-patient is not hypoxic              Lovenox 40mg daily              ECHO 7/21 with intact EF. Previous EF of 70-75%     New onset delirium and encephalopathy secondary to above              Encephalopathy order set              Should hopefully improve with resolution of sepsis   Currently holding home topiramate, nortriptyline, Keppra and Trileptal secondary to somnolence. Checking levels    Appreciate ID recommendations regarding possible work-up for meningitis?     Diarrhea- improved               CT scan showed no acute abnormality in the abdomen or pelvis              Likely related to COVID infection                 Dysphagia              Reports occasional coughing with meals              Suspected aspiration in February 2022, video swallow at that time showed trace amount of aspiration with thin barium consistency              Video swallow 7/20 with some aspiration with thin liquids              Speech recommending minced and  moist diet level 5 and mildly thick liquid     Acute urinary retention               Likely related to acute illness and delirium               Abrams catheter placed 7/21- PEDRO prior to discharge      Acute kidney injury- resolved with IVF               Secondary to volume depletion              Continue IV hydration               Monitor for volume overload               Avoid nephrotoxins including NSAIDs     Hypokalemia              Secondary to diarrhea              Replace per protocol     Hyperglycemia- improved without insulin               Continue to monitor              A1c of 5.6      Trigeminal neuralgia, chronic pain     Depression              Did not resume mirtazapine as patient reportedly stopped taking this medication due to somnolence               Resumed home nortriptyline- now on hold 2/2 somnolence       Iron deficiency anemia              Stable hemoglobin    Barriers to discharge: Improvement in mentation, ID recs, IV medication    Anticipated length of stay: Several days      Present on Admission:    Sepsis (H)    Sepsis, due to unspecified organism, unspecified whether acute organ dysfunction present (H)    Infection due to 2019 novel coronavirus    ORTIZ (acute kidney injury) (H)      Subjective:  Patient is very sleepy on exam today.  She does awaken with aggressive verbal stimuli and with physical touch.  She notes that she has a headache and neck discomfort.  She does not participate much with physical exam so it is difficult to tell if she has neck stiffness.  No complaints of shortness of breath.  No other issues today.    PHYSICAL EXAM  Temp:  [97  F (36.1  C)-101.9  F (38.8  C)] 97  F (36.1  C)  Pulse:  [] 95  Resp:  [20-24] 20  BP: (114-149)/(57-76) 129/76  SpO2:  [94 %-98 %] 98 %  Wt Readings from Last 1 Encounters:   07/19/22 54.4 kg (120 lb)       Intake/Output Summary (Last 24 hours) at 7/22/2022 1220  Last data filed at 7/22/2022 1216  Gross per 24 hour   Intake 898.75 ml    Output 1300 ml   Net -401.25 ml      Body mass index is 19.97 kg/m .    GENRL: Sleepy on exam.  Answering some questions with a soft whisper.  Not in acute distress. Lying in bed   HEENT: no JVP elevation, no lymphadenopathy or thyromegaly  CHEST: Diminished throughout.  No wheezes noted.    HEART: Tachycardic.  No murmur auscultated.    ABDMN: Soft. Non-tender, non-distended. No organomegaly. No guarding or rigidity. Bowel sounds present   EXTRM: No pedal edema, DP pulses 2+.   NEURO: Not participating in exam.  Unable to assess neurologic status.    PSYCH: flat affect and mood.   INTGM: No skin rash, no cyanosis or clubbing    PERTINENT LABS/IMAGING:  Results for orders placed or performed during the hospital encounter of 07/19/22   CT Abdomen Pelvis w/o Contrast    Impression    IMPRESSION:   1.  Patchy consolidation and infiltrate at both lung bases are suspicious for pneumonia.  2.  No acute abnormality in the abdomen or pelvis.     Chest XR,  PA & LAT    Impression    IMPRESSION: Lungs are clear without consolidation or effusion. Cardiac and mediastinal silhouettes and osseous structures appear unchanged.   Echocardiogram Complete   Result Value Ref Range    LVEF  65-70%      Most Recent 3 CBC's:Recent Labs   Lab Test 07/22/22  0849 07/21/22  0931 07/20/22  0923   WBC 17.3* 19.7* 26.2*   HGB 9.3* 10.2* 9.3*   MCV 73* 72* 72*    475* 472*       Recent Labs   Lab Test 05/19/22  1430   CHOL 247*   HDL 77   *   TRIG 105     Recent Labs   Lab Test 05/19/22  1430 05/04/21  1205 05/10/18  1225   * 114 125     Recent Labs   Lab Test 07/22/22  0849      POTASSIUM 3.4*   CHLORIDE 110*   CO2 23      BUN 19   CR 0.64   GFRESTIMATED >90   ADY 8.8     Recent Labs   Lab Test 07/19/22  1901   A1C 5.6     Recent Labs   Lab Test 07/22/22  0849 07/21/22  0931 07/20/22  0923   HGB 9.3* 10.2* 9.3*     Recent Labs   Lab Test 07/19/22  1901 02/07/22  0011 02/06/19  2125   TROPONINI 0.03 <0.01 0.05      No results for input(s): BNP, NTBNPI, NTBNP in the last 06537 hours.  Recent Labs   Lab Test 07/19/22  1901   TSH 2.44     Recent Labs   Lab Test 07/19/22  2255 02/07/22  0012 02/06/19  2125   INR 1.36* 1.05 0.97       Ashwini Dukes DO  Hospitalist Service  [facility]  Text page via [facility] Paging/Directory

## 2022-07-22 NOTE — CONSULTS
[facility]  General ID Service Consult      Patient: Julisa Chaney  YOB: 1945, MRN: [id number]  Date of Admission:  7/19/2022  Date of Consult: 07/22/2022  Consult Requested by: Ashwini Dukes*  Admission Diagnosis: Lung infiltrate [R91.8]  Sepsis, due to unspecified organism, unspecified whether acute organ dysfunction present (H) [A41.9]  Infection due to 2019 novel coronavirus [U07.1]  Consult Question: encephalopathy    ID Assessment & Plan   COVID19 pneumonia, vaccinated, syx 7/15  Bacterial pneumonia as PCT is high  Trace amount of aspiration with thin barium consistency  MRSA screen positive  PCN allergy, rash  encephalopathy seems improved  Leukocytosis down    PLAN  Agree with management  On vanco meropenem  On RDV x5 days  Not on dexa since not hypoxic  Trend fevers and inflammatory markers    Faiza Chandler M.D.          Faiza Chandler MD  [facility]  ______________________________________________________________________      History of Present Illness   Per dr Hagen  77 year old female who was brought to the ED for evaluation of above chief complaint.  Past medical history of trigeminal neuralgia, chronic pain, migraines, hyperlipidemia, hypercalcemia, iron deficiency anemia, depression, pyloric ulcer.  She has felt unwell over the last 5 days with watery nonbloody diarrhea.  She has felt lightheaded and today fell on a pillow but denied head trauma or loss of consciousness.  She also complained of generalized body aches but denied chest pain or palpitations.  She has had an occasional cough especially when she eats.  She has not taken her medications consistently over the last 5 days.  She also reported some fevers and upon ED arrival temperature was 100.8  F and heart rate 125.  Work-up was consistent with COVID-pneumonia and severe sepsis.  Patient has been vaccinated and recently boosted about a month ago.  She lives  at home with her son, denies tobacco, alcohol or drugs.      Radiology personally reviewed  Ct                                                                   IMPRESSION:   1.  Patchy consolidation and infiltrate at both lung bases are suspicious for pneumonia.  2.  No acute abnormality in the abdomen or pelvis.    cxr  Lungs are clear without consolidation or effusion. Cardiac and mediastinal silhouettes and osseous structures appear unchanged      Review of Systems   The 10 point Review of Systems is negative other than noted in the HPI or here.     Past Medical History    Past Medical History:   Diagnosis Date     High cholesterol      Migraines      Sepsis (H) 8/10/2020       Past Surgical History   Past Surgical History:   Procedure Laterality Date     HYSTERECTOMY       PICC TRIPLE LUMEN PLACEMENT  2022          DE ESOPHAGOGASTRODUODENOSCOPY TRANSORAL DIAGNOSTIC N/A 2020    Procedure: ESOPHAGOGASTRODUODENOSCOPY (EGD);  Surgeon: Nathan Smalls MD;  Location: [facility];  Service: Gastroenterology     DE ESOPHAGOGASTRODUODENOSCOPY TRANSORAL DIAGNOSTIC N/A 2020    Procedure: ESOPHAGOGASTRODUODENOSCOPY (EGD), PYLORIC DILATION;  Surgeon: Nathan Smalls MD;  Location: [facility];  Service: Gastroenterology     [facility] COLONOSCOPY W/WO BRUSH/WASH N/A 2020    Procedure: COLONOSCOPY WITH POLYPECTOMY;  Surgeon: Nathan Smalls MD;  Location: [facility];  Service: Gastroenterology       Social History   Social History     Tobacco Use     Smoking status: Former Smoker     Packs/day: 1.00     Years: 50.00     Pack years: 50.00     Quit date: 2014     Years since quittin.6     Smokeless tobacco: Never Used   Substance Use Topics     Alcohol use: No     Drug use: No       Family History   I have reviewed this patient's family history and updated it with pertinent information if needed.  Family History   Problem Relation Age of Onset     Cancer Father      Testicular  cancer Grandchild 17.00     Breast Cancer Mother      Breast Cancer Sister      Breast Cancer Maternal Aunt      Breast Cancer Sister        Medications   I have reviewed this patient's current medications    Allergies   Allergies   Allergen Reactions     Contrast [Iohexol] Rash     Noticed during 08/2020 admission. Received IV contrast on 8/5     Chocolate Headache     Penicillins Rash       Physical Exam   Vital Signs: Temp: 97  F (36.1  C) Temp src: Axillary BP: 129/76 Pulse: 95   Resp: 20 SpO2: 98 % O2 Device: None (Room air)    Weight: 120 lbs 0 oz  Gen. appearance nontoxic  Eyes no conjunctivitis or icterus  Neck no stiffness or neck vein distention, no LN  Heart  No edema  Lungs breathing comfortably  Abdomen soft not tender  Extremities no synovitis, trace edema  Skin  no rash or emboli  Neurologic alert oriented no focal deficits oriented to two no tremors        Data   Inflammatory Markers   Recent Labs   Lab Test 07/22/22  0849 07/21/22  0931 07/20/22  0923 07/19/22  1901 02/08/22  0743 08/08/20  0212 08/08/20  0212 08/05/20  1820   SED  --   --   --   --   --  13  --  15   CRP 20.9* 28.0* 26.1* 22.9* 11.0*  --  9.0* 0.4        Hematology Studies   Recent Labs   Lab Test 07/22/22  0849 07/21/22  0931 07/20/22  0923 07/19/22  1901 02/17/22  1500 02/10/22  0752   WBC 17.3* 19.7* 26.2* 32.5* 9.6 4.5   HGB 9.3* 10.2* 9.3* 11.7 9.3* 8.9*   MCV 73* 72* 72* 72* 82 80    475* 472* 647* 443 387       Metabolic Studies   Recent Labs   Lab Test 07/22/22  0849 07/21/22  2332 07/21/22  1608 07/21/22  0931 07/20/22  1800 07/20/22  0923 07/19/22  1901 05/19/22  1430     --   --  142  --  139 136 137   POTASSIUM 3.4* 4.3 3.4* 3.3* 4.0 3.4* 3.4* 4.1   CHLORIDE 110*  --   --  108*  --  108* 99 100   CO2 23  --   --  24  --  21* 23 26   BUN 19  --   --  17  --  20 28 21   CR 0.64  --   --  0.69  --  0.66 1.11* 0.96   GFRESTIMATED >90  --   --  89  --  90 51* 61       Hepatic Studies    Recent Labs   Lab Test  07/22/22  0849 07/21/22  0931 07/19/22  1901 05/19/22  1430 02/10/22  0752 02/07/22  0011   BILITOTAL 0.3 0.3 0.5 0.2 0.1 0.2   ALKPHOS 86 90 134* 133* 96 138*   ALBUMIN 2.4* 2.7* 3.6 3.3* 2.3* 3.3*   AST 30 19 25 21 14 14   ALT 31 22 31 19 11 17       Most Recent 6 Bacteria Isolates From Any Culture (See EPIC Reports for Culture Details):No lab results found.    Urine Studies    Recent Labs   Lab Test 07/19/22 2017 02/07/22  1102 08/08/20  0253 08/05/20 2024 02/07/19  0545   LEUKEST Negative Negative Trace* Negative Large*   WBCU <1 <1 0-5  --  10-25*       Vancomycin Levels    Recent Labs   Lab Test 07/22/22  0849 02/10/22  0752   VANCOMYCIN 7.8 42.9*       Hepatitis B Testing No lab results found.  Hepatitis C Testing   No results found for: HCVAB, HQTG, HCGENO, HCPCR, HQTRNA, HEPRNA  HIVTesting No lab results found.    Respiratory Virus Testing    No results found for: RS, FLUAG  COVID-19 Antibody Results, Testing for Immunity    COVID-19 Antibody Results, Testing for Immunity   No data to display.         COVID-19 PCR Results    COVID-19 PCR Results 8/8/20 2/7/22 7/19/22   SARS-CoV-2 Virus Specimen Source Nasopharyngeal     SARS-CoV-2 PCR Result NEGATIVE     SARS CoV2 PCR  Negative Positive (A)   (A) Abnormal value       Comments are available for some flowsheets but are not being displayed.

## 2022-07-22 NOTE — PROCEDURES
"PICC Line Insertion Procedure Note  Pt. Name: Julisa Chaney  MRN:        [id number]    Procedure: Insertion of a  Triple Lumen  5 fr  Bard SOLO (valved) Power PICC, Lot number [id number]    Indications: Vascular Access    Contraindications : none    Procedure Details   Patient identified with 2 identifiers and \"Time Out\" conducted.  .     Central line insertion bundle followed: hand hygeine performed prior to procedure, site cleansed with cholraprep, hat, mask, sterile gloves,sterile gown worn, patient draped with maximum barrier head to toe drape, sterile field maintained.    The vein was assessed and found to be compressible and of adequate size. 1 ml 1% Lidocaine administered sq to the insertion site. A 5 Fr PICC was inserted into the bas vein of the right arm with ultrasound guidance. 1 attempt(s) required to access vein.   Catheter threaded without difficulty. Good blood return noted.    Modified Seldinger Technique used for insertion.    The 8 sharps that are included in the PICC insertion kit were accounted for and disposed of in the sharps container prior to breakdown of the sterile field.    Catheter secured with Statlock, biopatch and Tegaderm dressing applied.    Findings:  Total catheter length  40 cm, with 2 cm exposed. Mid upper arm circumference is 28 cm. Catheter was flushed with 30 cc NS. Patient  tolerated procedure well.    Tip placement verified by 3CG technology. Tip placement in the SVC.    CLABSI prevention brochure left at bedside.    Patient's primary RN notified PICC is ready for use.    Comments:          Don ALFARON,RN,[facility] Vascular Access        "

## 2022-07-22 NOTE — PLAN OF CARE
Patient is alert and oriented to person. Reported pain on shoulders-tylenol given. Patient lungs sound clear/diminished. She is on room air; tolerating well.  Patient is on telemetry cardiac monitoring. Patient has narayan catheter-adequate urine output. Her skin is fragile but intact-repositioning in bed. Patient oral intake poor she is weak thus need total assist with cares. PICC line place today-IVF, antibiotics. Patient is on IVF. Encourage oral intake. Monitor.   Problem: Infection (Pneumonia)  Goal: Resolution of Infection Signs and Symptoms  Outcome: Ongoing, Progressing  Intervention: Prevent Infection Progression    Problem: Fluid Imbalance (Pneumonia)  Goal: Fluid Balance  Outcome: Ongoing, Progressing     Problem: Pain Chronic (Persistent) (Comorbidity Management)  Goal: Acceptable Pain Control and Functional Ability  Outcome: Ongoing, Progressing

## 2022-07-22 NOTE — PLAN OF CARE
Problem: Respiratory Compromise (Pneumonia)  Goal: Effective Oxygenation and Ventilation  Outcome: Ongoing, Progressing  Intervention: Promote Airway Secretion Clearance  Recent Flowsheet Documentation  Taken 7/21/2022 2015 by Maxine Hernandez RN  Cough And Deep Breathing: done with encouragement  Intervention: Optimize Oxygenation and Ventilation  Recent Flowsheet Documentation  Taken 7/21/2022 2215 by Maxine Hernandez, RN  Head of Bed (HOB) Positioning: HOB at 30-45 degrees  Taken 7/21/2022 2015 by Maxine Hernandez RN  Head of Bed (HOB) Positioning: HOB at 20-30 degrees     Problem: Plan of Care - These are the overarching goals to be used throughout the patient stay.    Goal: Plan of Care Review/Shift Note  Description: The Plan of Care Review/Shift note should be completed every shift.  The Outcome Evaluation is a brief statement about your assessment that the patient is improving, declining, or no change.  This information will be displayed automatically on your shift note.  Outcome: Ongoing, Progressing   Goal Outcome Evaluation:      Patient somnolent and weak  this shift, arousable to voice, orientation fluctuates, she have 3 infiltrated IV lines this shift. Paged  Hospitalist for new order for PICC line.   Patient tempeture elevated this am, PRN tylenol was given, report given to the day shift nurse.   Aliza in place. Room air. Tele Sinus Tachy.                  Doxepin Pregnancy And Lactation Text: This medication is Pregnancy Category C and it isn't known if it is safe during pregnancy. It is also excreted in breast milk and breast feeding isn't recommended.

## 2022-07-22 NOTE — PHARMACY-VANCOMYCIN DOSING SERVICE
Pharmacy Vancomycin Note  Date of Service 2022  Patient's  1945   77 year old, female    Indication: Healthcare-Associated Pneumonia  Day of Therapy: 3  Current vancomycin regimen:  750 mg IV q12h  Current vancomycin monitoring method: AUC  Current vancomycin therapeutic monitoring goal: 400-600 mg*h/L    [employer] Prediction of Current Vancomycin Regimen  Loading dose: N/A  Regimen: 750 mg IV every 12 hours.  Start time: 10:43 on 2022  Exposure target: AUC24 (range)400-600 mg/L.hr   AUC24,ss: 422 mg/L.hr  Probability of AUC24 > 400: 61 %  Ctrough,ss: 12.1 mg/L  Probability of Ctrough,ss > 20: 3 %  Probability of nephrotoxicity (Lodise ESTIVEN ): 7 %    Current estimated CrCl = Estimated Creatinine Clearance: 63.2 mL/min (based on SCr of 0.64 mg/dL).    Creatinine for last 3 days  2022:  7:01 PM Creatinine 1.11 mg/dL  2022:  9:23 AM Creatinine 0.66 mg/dL  2022:  9:31 AM Creatinine 0.69 mg/dL  2022:  8:49 AM Creatinine 0.64 mg/dL    Recent Vancomycin Levels (past 3 days)  2022:  8:49 AM Vancomycin 7.8 mg/L    Vancomycin IV Administrations (past 72 hours)                   vancomycin (VANCOCIN) 750 mg in sodium chloride 0.9 % 250 mL intermittent infusion (mg) 750 mg New Bag 22 1719     750 mg New Bag  0628     750 mg New Bag 22 1847    vancomycin (VANCOCIN) 1000 mg in dextrose 5% 200 mL PREMIX (mg) 1,000 mg New Bag 22 0148                Nephrotoxins and other renal medications (From now, onward)    Start     Dose/Rate Route Frequency Ordered Stop    22 1800  vancomycin (VANCOCIN) 750 mg in sodium chloride 0.9 % 250 mL intermittent infusion         750 mg  over 60-90 Minutes Intravenous EVERY 12 HOURS 22 1448               Contrast Orders - past 72 hours (72h ago, onward)    Start     Dose/Rate Route Frequency Stop    22 1600  perflutren lipid microsphere (DEFINITY) injection SUSP 3 mL         3 mL Intravenous ONCE 22 8142     07/20/22 1130  barium sulfate (VARIBAR THIN Liquid) 40 % oral suspension 24 g         60 mL Oral ONCE 07/20/22 1114    07/20/22 1130  barium sulfate 40% (VARIBAR NECTAR) oral suspension          Oral ONCE 07/20/22 1113    07/20/22 1130  barium sulfate (VARIBAR) 40 % pudding/paste 20 mL         20 mL Oral ONCE 07/20/22 1113    07/20/22 1130  barium sulfate 40% (VARIBAR THIN HONEY) oral suspension          Oral ONCE 07/20/22 1113          Interpretation of levels and current regimen:  Vancomycin level is reflective of -600    Has serum creatinine changed greater than 50% in last 72 hours: No    Plan:  1. Continue Current Dose  2. Vancomycin monitoring method: AUC  3. Vancomycin therapeutic monitoring goal: 400-600 mg*h/L  4. Pharmacy will check vancomycin levels as appropriate in 3-5 Days.  5. Serum creatinine levels will be ordered a minimum of twice weekly.    Lisset Dougherty RPH

## 2022-07-23 LAB
ALBUMIN SERPL-MCNC: 2.3 G/DL (ref 3.5–5)
ALP SERPL-CCNC: 71 U/L (ref 45–120)
ALT SERPL W P-5'-P-CCNC: 34 U/L (ref 0–45)
ANION GAP SERPL CALCULATED.3IONS-SCNC: 4 MMOL/L (ref 5–18)
AST SERPL W P-5'-P-CCNC: 30 U/L (ref 0–40)
BILIRUB DIRECT SERPL-MCNC: 0.2 MG/DL
BILIRUB SERPL-MCNC: 0.4 MG/DL (ref 0–1)
BUN SERPL-MCNC: 13 MG/DL (ref 8–28)
C REACTIVE PROTEIN LHE: 14.2 MG/DL (ref 0–?)
CALCIUM SERPL-MCNC: 8.4 MG/DL (ref 8.5–10.5)
CHLORIDE BLD-SCNC: 104 MMOL/L (ref 98–107)
CO2 SERPL-SCNC: 27 MMOL/L (ref 22–31)
CREAT SERPL-MCNC: 0.57 MG/DL (ref 0.6–1.1)
D DIMER PPP FEU-MCNC: 1.03 UG/ML FEU (ref 0–0.5)
ERYTHROCYTE [DISTWIDTH] IN BLOOD BY AUTOMATED COUNT: 18.2 % (ref 10–15)
GFR SERPL CREATININE-BSD FRML MDRD: >90 ML/MIN/1.73M2
GLUCOSE BLD-MCNC: 88 MG/DL (ref 70–125)
HCT VFR BLD AUTO: 30.6 % (ref 35–47)
HGB BLD-MCNC: 8.7 G/DL (ref 11.7–15.7)
MCH RBC QN AUTO: 20.5 PG (ref 26.5–33)
MCHC RBC AUTO-ENTMCNC: 28.4 G/DL (ref 31.5–36.5)
MCV RBC AUTO: 72 FL (ref 78–100)
PLATELET # BLD AUTO: 391 10E3/UL (ref 150–450)
POTASSIUM BLD-SCNC: 3.7 MMOL/L (ref 3.5–5)
PROT SERPL-MCNC: 6 G/DL (ref 6–8)
RBC # BLD AUTO: 4.25 10E6/UL (ref 3.8–5.2)
SODIUM SERPL-SCNC: 135 MMOL/L (ref 136–145)
WBC # BLD AUTO: 12.2 10E3/UL (ref 4–11)

## 2022-07-23 PROCEDURE — 85027 COMPLETE CBC AUTOMATED: CPT | Performed by: HOSPITALIST

## 2022-07-23 PROCEDURE — 250N000013 HC RX MED GY IP 250 OP 250 PS 637: Performed by: HOSPITALIST

## 2022-07-23 PROCEDURE — 250N000013 HC RX MED GY IP 250 OP 250 PS 637: Performed by: INTERNAL MEDICINE

## 2022-07-23 PROCEDURE — 120N000001 HC R&B MED SURG/OB

## 2022-07-23 PROCEDURE — 80053 COMPREHEN METABOLIC PANEL: CPT | Performed by: HOSPITALIST

## 2022-07-23 PROCEDURE — 86140 C-REACTIVE PROTEIN: CPT | Performed by: HOSPITALIST

## 2022-07-23 PROCEDURE — 82248 BILIRUBIN DIRECT: CPT | Performed by: INTERNAL MEDICINE

## 2022-07-23 PROCEDURE — 258N000003 HC RX IP 258 OP 636: Performed by: INTERNAL MEDICINE

## 2022-07-23 PROCEDURE — 250N000011 HC RX IP 250 OP 636: Performed by: HOSPITALIST

## 2022-07-23 PROCEDURE — 250N000011 HC RX IP 250 OP 636: Performed by: INTERNAL MEDICINE

## 2022-07-23 PROCEDURE — 99233 SBSQ HOSP IP/OBS HIGH 50: CPT | Performed by: INTERNAL MEDICINE

## 2022-07-23 PROCEDURE — 85379 FIBRIN DEGRADATION QUANT: CPT | Performed by: HOSPITALIST

## 2022-07-23 PROCEDURE — 258N000003 HC RX IP 258 OP 636: Performed by: HOSPITALIST

## 2022-07-23 PROCEDURE — 99207 PR NO CHARGE LOS: CPT

## 2022-07-23 PROCEDURE — 99207 PR CDG-CUT & PASTE-POTENTIAL IMPACT ON LEVEL: CPT | Performed by: INTERNAL MEDICINE

## 2022-07-23 RX ORDER — POTASSIUM CHLORIDE 1500 MG/1
20 TABLET, EXTENDED RELEASE ORAL ONCE
Status: COMPLETED | OUTPATIENT
Start: 2022-07-23 | End: 2022-07-23

## 2022-07-23 RX ADMIN — ACETAMINOPHEN AND CODEINE PHOSPHATE 1 TABLET: 300; 30 TABLET ORAL at 08:05

## 2022-07-23 RX ADMIN — ACETAMINOPHEN AND CODEINE PHOSPHATE 1 TABLET: 300; 30 TABLET ORAL at 16:28

## 2022-07-23 RX ADMIN — POTASSIUM CHLORIDE 20 MEQ: 1500 TABLET, EXTENDED RELEASE ORAL at 12:02

## 2022-07-23 RX ADMIN — ACETAMINOPHEN AND CODEINE PHOSPHATE 1 TABLET: 300; 30 TABLET ORAL at 22:41

## 2022-07-23 RX ADMIN — IPRATROPIUM BROMIDE AND ALBUTEROL 2 PUFF: 20; 100 SPRAY, METERED RESPIRATORY (INHALATION) at 08:06

## 2022-07-23 RX ADMIN — PANTOPRAZOLE SODIUM 40 MG: 20 TABLET, DELAYED RELEASE ORAL at 07:24

## 2022-07-23 RX ADMIN — SODIUM CHLORIDE, POTASSIUM CHLORIDE, SODIUM LACTATE AND CALCIUM CHLORIDE: 600; 310; 30; 20 INJECTION, SOLUTION INTRAVENOUS at 01:14

## 2022-07-23 RX ADMIN — VANCOMYCIN HYDROCHLORIDE 750 MG: 1 INJECTION, POWDER, LYOPHILIZED, FOR SOLUTION INTRAVENOUS at 17:27

## 2022-07-23 RX ADMIN — MEROPENEM 1 G: 1 INJECTION, POWDER, FOR SOLUTION INTRAVENOUS at 08:05

## 2022-07-23 RX ADMIN — IPRATROPIUM BROMIDE AND ALBUTEROL 2 PUFF: 20; 100 SPRAY, METERED RESPIRATORY (INHALATION) at 19:18

## 2022-07-23 RX ADMIN — VANCOMYCIN HYDROCHLORIDE 750 MG: 1 INJECTION, POWDER, LYOPHILIZED, FOR SOLUTION INTRAVENOUS at 07:18

## 2022-07-23 RX ADMIN — MEROPENEM 1 G: 1 INJECTION, POWDER, FOR SOLUTION INTRAVENOUS at 01:13

## 2022-07-23 RX ADMIN — IPRATROPIUM BROMIDE AND ALBUTEROL 2 PUFF: 20; 100 SPRAY, METERED RESPIRATORY (INHALATION) at 12:03

## 2022-07-23 RX ADMIN — IPRATROPIUM BROMIDE AND ALBUTEROL 2 PUFF: 20; 100 SPRAY, METERED RESPIRATORY (INHALATION) at 16:31

## 2022-07-23 RX ADMIN — PANTOPRAZOLE SODIUM 40 MG: 20 TABLET, DELAYED RELEASE ORAL at 16:29

## 2022-07-23 RX ADMIN — ACETAMINOPHEN 650 MG: 325 TABLET ORAL at 16:28

## 2022-07-23 RX ADMIN — ENOXAPARIN SODIUM 40 MG: 40 INJECTION SUBCUTANEOUS at 07:22

## 2022-07-23 RX ADMIN — ACETAMINOPHEN 650 MG: 325 TABLET ORAL at 08:04

## 2022-07-23 RX ADMIN — ACETAMINOPHEN 650 MG: 325 TABLET ORAL at 01:15

## 2022-07-23 RX ADMIN — MEROPENEM 1 G: 1 INJECTION, POWDER, FOR SOLUTION INTRAVENOUS at 16:31

## 2022-07-23 ASSESSMENT — ACTIVITIES OF DAILY LIVING (ADL)
ADLS_ACUITY_SCORE: 33

## 2022-07-23 NOTE — PROGRESS NOTES
Infectious Disease Chart Check    Still with fever, but WBC and CRP trending down, still on room air, and BC remain  negative to date    Will see again on 7/25.    If further questions or concerns,  On call Infectious Disease can be reached at [phone]    Or page on [facility]/ [email or web address]    WOO DAVIS MD

## 2022-07-23 NOTE — PLAN OF CARE
Patient on IVF, tolerating. No edema. Lungs Clear/diminished. Patient afebrile, weak, on IV antibiotics. Patient reposition for comfort. Monitor.   Problem: Fluid Imbalance (Pneumonia)  Goal: Fluid Balance  Outcome: Ongoing, Progressing   Problem: Infection (Pneumonia)  Goal: Resolution of Infection Signs and Symptoms  7/22/2022 2257 by Jl West RN  Outcome: Ongoing, Progressing

## 2022-07-23 NOTE — PROGRESS NOTES
[facility]    Medicine Progress Note - Hospitalist Service    Date of Admission:  7/19/2022    Assessment & Plan          Julisa Chaney is a 77 year old female admitted on 7/19/2022. She came to the ED for evaluation of diarrhea, fevers, generalized weakness, lightheadedness, fall.     Confirmed COVID-19 infection                Viral Pneumonia secondary to COVID-19 infection              Bacterial Superinfection              Severe sepsis due to Pneumonia- concern for aspiration PNA               Fully vaccinated with booster              Inhalers as needed              Continue fluid hydration 100 mL/h-monitor for fluid overload              Meropenem and vancomycin initiated 7/19.  MRSA nasal screen positive              Remdesivir initiated 7/19              No dexamethasone-patient is not hypoxic              Lovenox 40mg daily              ECHO 7/21 with intact EF. Previous EF of 70-75%     New onset delirium and encephalopathy secondary to above              Encephalopathy order set              Should hopefully improve with resolution of sepsis              Currently holding home topiramate, nortriptyline, Keppra and Trileptal secondary to somnolence. Checking levels               Appreciate ID recommendations regarding possible work-up for meningitis?     Diarrhea- improved               CT scan showed no acute abnormality in the abdomen or pelvis              Likely related to COVID infection                 Dysphagia              Reports occasional coughing with meals              Suspected aspiration in February 2022, video swallow at that time showed trace amount of aspiration with thin barium consistency              Video swallow 7/20 with some aspiration with thin liquids              Speech recommending minced and moist diet level 5 and mildly thick liquid     Acute urinary retention               Likely related to acute illness and delirium               Abrams catheter  placed 7/21- PEDRO prior to discharge      Acute kidney injury- resolved with IVF               Secondary to volume depletion              Continue IV hydration               Monitor for volume overload               Avoid nephrotoxins including NSAIDs     Hypokalemia              Secondary to diarrhea              Replace per protocol     Hyperglycemia- improved without insulin               Continue to monitor              A1c of 5.6      Trigeminal neuralgia, chronic pain     Depression              Did not resume mirtazapine as patient reportedly stopped taking this medication due to somnolence               Resumed home nortriptyline- now on hold 2/2 somnolence       Iron deficiency anemia              Stable hemoglobin     Barriers to discharge: Improvement in mentation, ID recs, IV medication     Anticipated length of stay: Several days             Diet: Minced & Moist Diet (level 5) Mildly Thick (level 2)    DVT Prophylaxis: Enoxaparin (Lovenox) SQ  Abrams Catheter: PRESENT, indication: Retention  Central Lines: PRESENT  PICC Triple Lumen 07/22/22 Right Basilic Vascular access-Site Assessment: WDL  Cardiac Monitoring: ACTIVE order. Indication: Tachyarrhythmias, acute (48 hours)  Code Status: Full Code      Disposition Plan      Expected Discharge Date: 07/25/2022    Discharge Delays: Other (Add Comment)  IV Medication - consider oral or Home Infusion  Isolation - find facility that will accept    Discharge Comments: PICC line placed 7/22- IV abx        The patient's care was discussed with the Patient.    Elana Warren MD  Hospitalist Service  [facility]  Securely message with the Vocera Web Console (learn more here)  Text page via [employer] Paging/Directory         Clinically Significant Risk Factors Present on Admission                     ______________________________________________________________________    Interval History   Same pain as before, chest and back; no fever or  productive cough, no n/v, no f/c    Data reviewed today: I reviewed all medications, new labs and imaging results over the last 24 hours.    Physical Exam   Vital Signs: Temp: 99.3  F (37.4  C) Temp src: Oral BP: (!) 148/67 Pulse: 95   Resp: 18 SpO2: 97 % O2 Device: None (Room air)    Weight: 120 lbs 0 oz  General.  Awake alert not in acute distress. Frail elderly  HEENT.  Pupils equal round react to light, anicteric, EOM intact.  Neck supple no JVD.  CVS regular rhythm no murmur gallops. Tachy  Lungs.  coarse to auscultation bilateral no wheezing or rales.  Abdomen.  Soft nontender bowel sounds present.  Extremities.  No edema no calf tenderness.  Neurological. General weakness No focal deficit.  Skin no rash. No pallor.  Psych. flat      Data   Recent Labs   Lab 07/23/22  0734 07/22/22  1941 07/22/22  0849 07/21/22  1608 07/21/22  0931 07/20/22  0923 07/19/22  2255 07/19/22  1901   WBC 12.2*  --  17.3*  --  19.7*   < >  --  32.5*   HGB 8.7*  --  9.3*  --  10.2*   < >  --  11.7   MCV 72*  --  73*  --  72*   < >  --  72*     --  387  --  475*   < >  --  647*   INR  --   --   --   --   --   --  1.36*  --    *  --  142  --  142   < >  --  136   POTASSIUM 3.7 3.5 3.4*   < > 3.3*   < >  --  3.4*   CHLORIDE 104  --  110*  --  108*   < >  --  99   CO2 27  --  23  --  24   < >  --  23   BUN 13  --  19  --  17   < >  --  28   CR 0.57*  --  0.64  --  0.69   < >  --  1.11*   ANIONGAP 4*  --  9  --  10   < >  --  14   ADY 8.4*  --  8.8  --  9.0   < >  --  10.1   GLC 88  --  117  --  100   < >  --  252*   ALBUMIN 2.3*  --  2.4*  --  2.7*  --   --  3.6   PROTTOTAL 6.0  --  6.5  --  7.0  --   --  8.9*   BILITOTAL 0.4  --  0.3  --  0.3  --   --  0.5   ALKPHOS 71  --  86  --  90  --   --  134*   ALT 34  --  31  --  22  --   --  31   AST 30  --  30  --  19  --   --  25   LIPASE  --   --   --   --   --   --   --  <9    < > = values in this interval not displayed.     No results found for this or any previous visit (from  the past 24 hour(s)).  Medications     - MEDICATION INSTRUCTIONS -         enoxaparin ANTICOAGULANT  40 mg Subcutaneous Q24H     ipratropium-albuterol  2 puff Inhalation 4x Daily     [Held by provider] levETIRAcetam  500 mg Oral BID     meropenem  1 g Intravenous Q8H     [Held by provider] nortriptyline  50 mg Oral BID     [Held by provider] OXcarbazepine  450 mg Oral At Bedtime     pantoprazole  40 mg Oral BID AC     sodium chloride (PF)  3 mL Intracatheter Q8H     [Held by provider] topiramate  50 mg Oral At Bedtime     vancomycin  750 mg Intravenous Q12H

## 2022-07-23 NOTE — PLAN OF CARE
Problem: Plan of Care - These are the overarching goals to be used throughout the patient stay.    Goal: Optimal Comfort and Wellbeing  Outcome: Ongoing, Progressing  Intervention: Monitor Pain and Promote Comfort  Recent Flowsheet Documentation  Taken 7/23/2022 0115 by Adriana oHu RN  Pain Management Interventions:   medication (see MAR)   emotional support   repositioned   rest     Problem: Risk for Delirium  Goal: Improved Sleep  Outcome: Ongoing, Progressing     Problem: Respiratory Compromise (Pneumonia)  Goal: Effective Oxygenation and Ventilation  Outcome: Ongoing, Progressing  Intervention: Promote Airway Secretion Clearance  Recent Flowsheet Documentation  Taken 7/23/2022 0430 by Adriana Hou RN  Cough And Deep Breathing: done with encouragement  Taken 7/23/2022 0115 by Adriana Hou RN  Cough And Deep Breathing: done with encouragement  Intervention: Optimize Oxygenation and Ventilation  Recent Flowsheet Documentation  Taken 7/23/2022 0430 by Adriana Hou RN  Head of Bed (HOB) Positioning: HOB at 30 degrees  Taken 7/23/2022 0115 by Adriana Hou RN  Head of Bed (HOB) Positioning: HOB at 30 degrees   Goal Outcome Evaluation:      Uneventful shift. Pt slept between cares. Had shoulder pain she rated 2/10 and tylenol was given, mainly for temp 102.5, which came down to 99.2 after 1 hour.Turned and repositioned every 2 hours, narayan catheter in place.

## 2022-07-23 NOTE — PLAN OF CARE
Problem: Plan of Care - These are the overarching goals to be used throughout the patient stay.    Goal: Readiness for Transition of Care  Outcome: Ongoing, Progressing   Goal Outcome Evaluation:    O2 sat 98% on room air.  Heart rate 103, temp 100.3 this morning, Came down with Tylenol.

## 2022-07-24 ENCOUNTER — APPOINTMENT (OUTPATIENT)
Dept: SPEECH THERAPY | Facility: HOSPITAL | Age: 77
DRG: 871 | End: 2022-07-24
Payer: COMMERCIAL

## 2022-07-24 LAB
CREAT SERPL-MCNC: 0.56 MG/DL (ref 0.6–1.1)
ERYTHROCYTE [DISTWIDTH] IN BLOOD BY AUTOMATED COUNT: 17.9 % (ref 10–15)
GFR SERPL CREATININE-BSD FRML MDRD: >90 ML/MIN/1.73M2
HCT VFR BLD AUTO: 33.5 % (ref 35–47)
HGB BLD-MCNC: 9.7 G/DL (ref 11.7–15.7)
MCH RBC QN AUTO: 20.4 PG (ref 26.5–33)
MCHC RBC AUTO-ENTMCNC: 29 G/DL (ref 31.5–36.5)
MCV RBC AUTO: 71 FL (ref 78–100)
PLATELET # BLD AUTO: 416 10E3/UL (ref 150–450)
POTASSIUM BLD-SCNC: 3.9 MMOL/L (ref 3.5–5)
RBC # BLD AUTO: 4.75 10E6/UL (ref 3.8–5.2)
TOPIRAMATE SERPL-MCNC: 2 UG/ML
WBC # BLD AUTO: 10.5 10E3/UL (ref 4–11)

## 2022-07-24 PROCEDURE — 250N000013 HC RX MED GY IP 250 OP 250 PS 637: Performed by: INTERNAL MEDICINE

## 2022-07-24 PROCEDURE — 258N000003 HC RX IP 258 OP 636: Performed by: HOSPITALIST

## 2022-07-24 PROCEDURE — 82565 ASSAY OF CREATININE: CPT | Performed by: HOSPITALIST

## 2022-07-24 PROCEDURE — 92526 ORAL FUNCTION THERAPY: CPT | Mod: GN

## 2022-07-24 PROCEDURE — 120N000001 HC R&B MED SURG/OB

## 2022-07-24 PROCEDURE — 84132 ASSAY OF SERUM POTASSIUM: CPT | Performed by: INTERNAL MEDICINE

## 2022-07-24 PROCEDURE — 250N000011 HC RX IP 250 OP 636: Performed by: INTERNAL MEDICINE

## 2022-07-24 PROCEDURE — 85014 HEMATOCRIT: CPT | Performed by: HOSPITALIST

## 2022-07-24 PROCEDURE — 250N000013 HC RX MED GY IP 250 OP 250 PS 637: Performed by: HOSPITALIST

## 2022-07-24 PROCEDURE — 250N000011 HC RX IP 250 OP 636: Performed by: HOSPITALIST

## 2022-07-24 PROCEDURE — 99232 SBSQ HOSP IP/OBS MODERATE 35: CPT | Performed by: INTERNAL MEDICINE

## 2022-07-24 RX ORDER — LIDOCAINE 4 G/G
1 PATCH TOPICAL
Status: DISCONTINUED | OUTPATIENT
Start: 2022-07-24 | End: 2022-07-24

## 2022-07-24 RX ORDER — LIDOCAINE 4 G/G
1 PATCH TOPICAL EVERY 24 HOURS
Status: DISCONTINUED | OUTPATIENT
Start: 2022-07-24 | End: 2022-07-27 | Stop reason: HOSPADM

## 2022-07-24 RX ADMIN — ACETAMINOPHEN AND CODEINE PHOSPHATE 1 TABLET: 300; 30 TABLET ORAL at 14:45

## 2022-07-24 RX ADMIN — IPRATROPIUM BROMIDE AND ALBUTEROL 2 PUFF: 20; 100 SPRAY, METERED RESPIRATORY (INHALATION) at 16:25

## 2022-07-24 RX ADMIN — ACETAMINOPHEN 650 MG: 325 TABLET ORAL at 09:31

## 2022-07-24 RX ADMIN — ACETAMINOPHEN AND CODEINE PHOSPHATE 1 TABLET: 300; 30 TABLET ORAL at 09:32

## 2022-07-24 RX ADMIN — IPRATROPIUM BROMIDE AND ALBUTEROL 2 PUFF: 20; 100 SPRAY, METERED RESPIRATORY (INHALATION) at 21:45

## 2022-07-24 RX ADMIN — VANCOMYCIN HYDROCHLORIDE 750 MG: 1 INJECTION, POWDER, LYOPHILIZED, FOR SOLUTION INTRAVENOUS at 17:37

## 2022-07-24 RX ADMIN — MEROPENEM 1 G: 1 INJECTION, POWDER, FOR SOLUTION INTRAVENOUS at 09:44

## 2022-07-24 RX ADMIN — ENOXAPARIN SODIUM 40 MG: 40 INJECTION SUBCUTANEOUS at 06:33

## 2022-07-24 RX ADMIN — OXCARBAZEPINE 450 MG: 150 TABLET, FILM COATED ORAL at 21:46

## 2022-07-24 RX ADMIN — ACETAMINOPHEN 650 MG: 325 TABLET ORAL at 21:46

## 2022-07-24 RX ADMIN — IPRATROPIUM BROMIDE AND ALBUTEROL 2 PUFF: 20; 100 SPRAY, METERED RESPIRATORY (INHALATION) at 13:48

## 2022-07-24 RX ADMIN — LEVETIRACETAM 500 MG: 500 TABLET ORAL at 21:45

## 2022-07-24 RX ADMIN — LIDOCAINE 1 PATCH: 246 PATCH TOPICAL at 16:23

## 2022-07-24 RX ADMIN — PANTOPRAZOLE SODIUM 40 MG: 20 TABLET, DELAYED RELEASE ORAL at 16:23

## 2022-07-24 RX ADMIN — NORTRIPTYLINE HYDROCHLORIDE 50 MG: 50 CAPSULE ORAL at 21:46

## 2022-07-24 RX ADMIN — TOPIRAMATE 50 MG: 25 TABLET, FILM COATED ORAL at 21:46

## 2022-07-24 RX ADMIN — ACETAMINOPHEN AND CODEINE PHOSPHATE 1 TABLET: 300; 30 TABLET ORAL at 04:49

## 2022-07-24 RX ADMIN — MEROPENEM 1 G: 1 INJECTION, POWDER, FOR SOLUTION INTRAVENOUS at 00:58

## 2022-07-24 RX ADMIN — IPRATROPIUM BROMIDE AND ALBUTEROL 2 PUFF: 20; 100 SPRAY, METERED RESPIRATORY (INHALATION) at 09:35

## 2022-07-24 RX ADMIN — MEROPENEM 1 G: 1 INJECTION, POWDER, FOR SOLUTION INTRAVENOUS at 16:25

## 2022-07-24 RX ADMIN — PANTOPRAZOLE SODIUM 40 MG: 20 TABLET, DELAYED RELEASE ORAL at 06:46

## 2022-07-24 RX ADMIN — VANCOMYCIN HYDROCHLORIDE 750 MG: 1 INJECTION, POWDER, LYOPHILIZED, FOR SOLUTION INTRAVENOUS at 06:43

## 2022-07-24 ASSESSMENT — ACTIVITIES OF DAILY LIVING (ADL)
ADLS_ACUITY_SCORE: 33
ADLS_ACUITY_SCORE: 31
ADLS_ACUITY_SCORE: 33

## 2022-07-24 NOTE — PLAN OF CARE
Problem: Plan of Care - These are the overarching goals to be used throughout the patient stay.    Goal: Optimal Comfort and Wellbeing  Outcome: Ongoing, Progressing  Intervention: Monitor Pain and Promote Comfort  Recent Flowsheet Documentation  Taken 7/24/2022 0449 by Adriana Hou RN  Pain Management Interventions:    medication (see MAR)    emotional support    repositioned    rest     Problem: Risk for Delirium  Goal: Improved Sleep  Outcome: Ongoing, Progressing     Problem: Infection (Pneumonia)  Goal: Resolution of Infection Signs and Symptoms  Outcome: Ongoing, Progressing  Intervention: Prevent Infection Progression  Recent Flowsheet Documentation  Taken 7/24/2022 0430 by Adriana Hou RN  Isolation Precautions:    airborne precautions maintained    contact precautions maintained    droplet precautions maintained  Taken 7/24/2022 0100 by Adriana Hou RN  Isolation Precautions:    airborne precautions maintained    contact precautions maintained    droplet precautions maintained     Problem: Respiratory Compromise (Pneumonia)  Goal: Effective Oxygenation and Ventilation  Outcome: Ongoing, Progressing  Intervention: Optimize Oxygenation and Ventilation  Recent Flowsheet Documentation  Taken 7/24/2022 0449 by Adriana Hou RN  Head of Bed (HOB) Positioning: HOB at 30 degrees  Taken 7/24/2022 0300 by Adriana Hou RN  Head of Bed (HOB) Positioning: HOB at 30-45 degrees  Taken 7/24/2022 0100 by Adriana Hou RN  Head of Bed (HOB) Positioning: HOB at 30-45 degrees   Goal Outcome Evaluation:      Pt has been receiving PRN Tylenol #3 every 6 hours for generalized and chronic lower back pain she rates 8-10/10. She has been sleeping between cares, sometimes refusing repositioning but did take in some PO fluids with encouragement each time staff was in the room. Abrams output was 700ml, triple lumen PICC line intact/patent. Lung sounds were clear, somewhat diminished, infrequent non-productive cough, and pt remains  on room air, with sats in the mid-90s.

## 2022-07-24 NOTE — PROGRESS NOTES
Care Management Follow Up    Length of Stay (days): 5    Expected Discharge Date: 07/25/2022     Concerns to be Addressed:    Fever, IV ABX   Patient plan of care discussed at interdisciplinary rounds: Yes    Anticipated Discharge Disposition:  TBD     Anticipated Discharge Services:  TBD  Anticipated Discharge DME:  TBD    Patient/family educated on Medicare website which has current facility and service quality ratings:    Education Provided on the Discharge Plan:    Patient/Family in Agreement with the Plan:      Referrals Placed by CM/SW:    Private pay costs discussed: Not applicable    Additional Information:  Pt lives in a house with her adult grandson. She is independent with ADLs at baseline. Family supportive helps with transport and housekeeping. CM to follow for medical progression, recommendations and final discharge plan.      Leigh Yin RN

## 2022-07-24 NOTE — PROGRESS NOTES
[facility]    Medicine Progress Note - Hospitalist Service    Date of Admission:  7/19/2022    Assessment & Plan            Julisa Chaney is a 77 year old female admitted on 7/19/2022. She came to the ED for evaluation of diarrhea, fevers, generalized weakness, lightheadedness, fall.     Confirmed COVID-19 infection                Viral Pneumonia secondary to COVID-19 infection              Bacterial Superinfection              Severe sepsis due to Pneumonia- concern for aspiration PNA               Fully vaccinated with booster              Inhalers as needed              Continue fluid hydration 100 mL/h-monitor for fluid overload              Meropenem and vancomycin initiated 7/19.  MRSA nasal screen positive              Remdesivir initiated 7/19              No dexamethasone-patient is not hypoxic              Lovenox 40mg daily              ECHO 7/21 with intact EF. Previous EF of 70-75%     New onset delirium and encephalopathy secondary to above              Encephalopathy order set              Initially holding home topiramate, nortriptyline, Keppra and Trileptal secondary to somnolence. Checking levels - now mental status improve, restart these home medications on 7/24              Appreciate ID recommendations     Diarrhea- improved               CT scan showed no acute abnormality in the abdomen or pelvis              Likely related to COVID infection                 Dysphagia              Reports occasional coughing with meals              Suspected aspiration in February 2022, video swallow at that time showed trace amount of aspiration with thin barium consistency              Video swallow 7/20 with some aspiration with thin liquids              Speech recommending minced and moist diet level 5 and mildly thick liquid     Acute urinary retention               Likely related to acute illness and delirium               Abrams catheter placed 7/21- PEDRO prior to discharge       Acute kidney injury- resolved with IVF               Secondary to volume depletion              Continue IV hydration               Monitor for volume overload               Avoid nephrotoxins including NSAIDs     Hypokalemia              Secondary to diarrhea              Replace per protocol-resolved, discontinue protocol , recheck bmp in am     Hyperglycemia- improved without insulin               Continue to monitor              A1c of 5.6      Trigeminal neuralgia, chronic pain   -resume home medications    Depression              Did not resume mirtazapine as patient reportedly stopped taking this medication due to somnolence               Resumed home nortriptyline     Iron deficiency anemia              Stable hemoglobin    General weakness  -poor appetite and oral intake  -encourage oral nutrition  -add supplement  -pt/ot eval  -as per daughter, patient can walk without assistance at baseline     Barriers to discharge: Improvement in mentation, ID recs, IV medication     Anticipated length of stay: Several days           Diet: Minced & Moist Diet (level 5) Mildly Thick (level 2)    DVT Prophylaxis: Enoxaparin (Lovenox) SQ  Abrams Catheter: PRESENT, indication: Retention  Central Lines: PRESENT  PICC Triple Lumen 07/22/22 Right Basilic Vascular access-Site Assessment: WDL  Cardiac Monitoring: ACTIVE order. Indication: Tachyarrhythmias, acute (48 hours)  Code Status: Full Code      Disposition Plan      Expected Discharge Date: 07/26/2022    Discharge Delays: IV Medication - consider oral or Home Infusion    Discharge Comments: PICC line placed 7/22- IV abx        The patient's care was discussed with the Patient and Patient's Family.    Elana Warren MD  Hospitalist Service  [facility]  Securely message with the Vocera Web Console (learn more here)  Text page via [employer] Paging/Directory         Clinically Significant Risk Factors Present on Admission                       ______________________________________________________________________    Interval History   Feeling a little better, drinks more; c/o neck and upper back soreness/pain; no cp/sob, no fever/chills, fatigue and no energy. Weakness.     Data reviewed today: I reviewed all medications, new labs and imaging results over the last 24 hours.     Physical Exam   Vital Signs: Temp: (!) 101.9  F (38.8  C) Temp src: Oral BP: (!) 149/69 Pulse: 110   Resp: 20 SpO2: 94 % O2 Device: None (Room air)    Weight: 120 lbs 5.94 oz  General.  Awake alert not in acute distress. Frail elderly, fatigue  HEENT.  Pupils equal round react to light, anicteric, EOM intact.  Neck supple no JVD.  CVS regular rhythm no murmur gallops.  Lungs.  Clear to auscultation bilateral no wheezing or rales.  Abdomen.  Soft nontender bowel sounds present.  Extremities.  No edema no calf tenderness.  Neurological.  Awake and alert. No focal deficit. General weakness  Skin no rash. No pallor.  Psych. Impaired memory      Data   Recent Labs   Lab 07/24/22  0643 07/23/22  0734 07/22/22  1941 07/22/22  0849 07/21/22  1608 07/21/22  0931 07/20/22  0923 07/19/22  2255 07/19/22  1901   WBC 10.5 12.2*  --  17.3*  --  19.7*   < >  --  32.5*   HGB 9.7* 8.7*  --  9.3*  --  10.2*   < >  --  11.7   MCV 71* 72*  --  73*  --  72*   < >  --  72*    391  --  387  --  475*   < >  --  647*   INR  --   --   --   --   --   --   --  1.36*  --    NA  --  135*  --  142  --  142   < >  --  136   POTASSIUM 3.9 3.7 3.5 3.4*   < > 3.3*   < >  --  3.4*   CHLORIDE  --  104  --  110*  --  108*   < >  --  99   CO2  --  27  --  23  --  24   < >  --  23   BUN  --  13  --  19  --  17   < >  --  28   CR 0.56* 0.57*  --  0.64  --  0.69   < >  --  1.11*   ANIONGAP  --  4*  --  9  --  10   < >  --  14   ADY  --  8.4*  --  8.8  --  9.0   < >  --  10.1   GLC  --  88  --  117  --  100   < >  --  252*   ALBUMIN  --  2.3*  --  2.4*  --  2.7*  --   --  3.6   PROTTOTAL  --  6.0  --  6.5  --  7.0   --   --  8.9*   BILITOTAL  --  0.4  --  0.3  --  0.3  --   --  0.5   ALKPHOS  --  71  --  86  --  90  --   --  134*   ALT  --  34  --  31  --  22  --   --  31   AST  --  30  --  30  --  19  --   --  25   LIPASE  --   --   --   --   --   --   --   --  <9    < > = values in this interval not displayed.     No results found for this or any previous visit (from the past 24 hour(s)).  Medications     - MEDICATION INSTRUCTIONS -         enoxaparin ANTICOAGULANT  40 mg Subcutaneous Q24H     ipratropium-albuterol  2 puff Inhalation 4x Daily     levETIRAcetam  500 mg Oral BID     lidocaine  1 patch Transdermal Q24h    And     lidocaine   Transdermal Q8H MARCIA     meropenem  1 g Intravenous Q8H     nortriptyline  50 mg Oral BID     OXcarbazepine  450 mg Oral At Bedtime     pantoprazole  40 mg Oral BID AC     sodium chloride (PF)  3 mL Intracatheter Q8H     topiramate  50 mg Oral At Bedtime     vancomycin  750 mg Intravenous Q12H

## 2022-07-25 ENCOUNTER — APPOINTMENT (OUTPATIENT)
Dept: CT IMAGING | Facility: HOSPITAL | Age: 77
DRG: 871 | End: 2022-07-25
Attending: FAMILY MEDICINE
Payer: COMMERCIAL

## 2022-07-25 ENCOUNTER — APPOINTMENT (OUTPATIENT)
Dept: RADIOLOGY | Facility: HOSPITAL | Age: 77
DRG: 871 | End: 2022-07-25
Attending: INTERNAL MEDICINE
Payer: COMMERCIAL

## 2022-07-25 ENCOUNTER — APPOINTMENT (OUTPATIENT)
Dept: PHYSICAL THERAPY | Facility: HOSPITAL | Age: 77
DRG: 871 | End: 2022-07-25
Attending: INTERNAL MEDICINE
Payer: COMMERCIAL

## 2022-07-25 ENCOUNTER — APPOINTMENT (OUTPATIENT)
Dept: SPEECH THERAPY | Facility: HOSPITAL | Age: 77
DRG: 871 | End: 2022-07-25
Payer: COMMERCIAL

## 2022-07-25 LAB
10OH-CARBAZEPINE SERPL-MCNC: 13 UG/ML
AMMONIA PLAS-SCNC: 30 UMOL/L (ref 11–35)
ANION GAP SERPL CALCULATED.3IONS-SCNC: 5 MMOL/L (ref 5–18)
BACTERIA BLD CULT: NO GROWTH
BACTERIA BLD CULT: NO GROWTH
BUN SERPL-MCNC: 9 MG/DL (ref 8–28)
C REACTIVE PROTEIN LHE: 3.7 MG/DL (ref 0–?)
CALCIUM SERPL-MCNC: 6.7 MG/DL (ref 8.5–10.5)
CHLORIDE BLD-SCNC: 105 MMOL/L (ref 98–107)
CK SERPL-CCNC: 14 U/L (ref 30–190)
CO2 SERPL-SCNC: 22 MMOL/L (ref 22–31)
CREAT SERPL-MCNC: 0.44 MG/DL (ref 0.6–1.1)
ERYTHROCYTE [DISTWIDTH] IN BLOOD BY AUTOMATED COUNT: 18 % (ref 10–15)
FOLATE SERPL-MCNC: 19.5 NG/ML (ref 4.6–34.8)
GFR SERPL CREATININE-BSD FRML MDRD: >90 ML/MIN/1.73M2
GLUCOSE BLD-MCNC: 92 MG/DL (ref 70–125)
HCT VFR BLD AUTO: 28.6 % (ref 35–47)
HGB BLD-MCNC: 8.1 G/DL (ref 11.7–15.7)
MAGNESIUM SERPL-MCNC: 1.6 MG/DL (ref 1.8–2.6)
MCH RBC QN AUTO: 20.6 PG (ref 26.5–33)
MCHC RBC AUTO-ENTMCNC: 28.3 G/DL (ref 31.5–36.5)
MCV RBC AUTO: 73 FL (ref 78–100)
PLATELET # BLD AUTO: 386 10E3/UL (ref 150–450)
POTASSIUM BLD-SCNC: 2.8 MMOL/L (ref 3.5–5)
POTASSIUM BLD-SCNC: 4.6 MMOL/L (ref 3.5–5)
RBC # BLD AUTO: 3.93 10E6/UL (ref 3.8–5.2)
SODIUM SERPL-SCNC: 132 MMOL/L (ref 136–145)
TSH SERPL DL<=0.005 MIU/L-ACNC: 0.56 UIU/ML (ref 0.3–5)
VANCOMYCIN SERPL-MCNC: 14.8 MG/L
VIT B12 SERPL-MCNC: 1536 PG/ML (ref 232–1245)
WBC # BLD AUTO: 7.2 10E3/UL (ref 4–11)

## 2022-07-25 PROCEDURE — 250N000011 HC RX IP 250 OP 636: Performed by: FAMILY MEDICINE

## 2022-07-25 PROCEDURE — 250N000013 HC RX MED GY IP 250 OP 250 PS 637: Performed by: FAMILY MEDICINE

## 2022-07-25 PROCEDURE — 250N000013 HC RX MED GY IP 250 OP 250 PS 637: Performed by: INTERNAL MEDICINE

## 2022-07-25 PROCEDURE — 258N000003 HC RX IP 258 OP 636: Performed by: HOSPITALIST

## 2022-07-25 PROCEDURE — 84145 PROCALCITONIN (PCT): CPT | Performed by: INTERNAL MEDICINE

## 2022-07-25 PROCEDURE — 999N000157 HC STATISTIC RCP TIME EA 10 MIN

## 2022-07-25 PROCEDURE — 97530 THERAPEUTIC ACTIVITIES: CPT | Mod: GP

## 2022-07-25 PROCEDURE — 250N000011 HC RX IP 250 OP 636: Performed by: INTERNAL MEDICINE

## 2022-07-25 PROCEDURE — 70450 CT HEAD/BRAIN W/O DYE: CPT

## 2022-07-25 PROCEDURE — 71045 X-RAY EXAM CHEST 1 VIEW: CPT

## 2022-07-25 PROCEDURE — 99233 SBSQ HOSP IP/OBS HIGH 50: CPT | Performed by: INTERNAL MEDICINE

## 2022-07-25 PROCEDURE — 80048 BASIC METABOLIC PNL TOTAL CA: CPT | Performed by: INTERNAL MEDICINE

## 2022-07-25 PROCEDURE — 120N000001 HC R&B MED SURG/OB

## 2022-07-25 PROCEDURE — 97162 PT EVAL MOD COMPLEX 30 MIN: CPT | Mod: GP

## 2022-07-25 PROCEDURE — 86140 C-REACTIVE PROTEIN: CPT | Performed by: FAMILY MEDICINE

## 2022-07-25 PROCEDURE — 250N000013 HC RX MED GY IP 250 OP 250 PS 637: Performed by: HOSPITALIST

## 2022-07-25 PROCEDURE — 258N000003 HC RX IP 258 OP 636: Performed by: INTERNAL MEDICINE

## 2022-07-25 PROCEDURE — 85027 COMPLETE CBC AUTOMATED: CPT | Performed by: HOSPITALIST

## 2022-07-25 PROCEDURE — 99233 SBSQ HOSP IP/OBS HIGH 50: CPT | Performed by: FAMILY MEDICINE

## 2022-07-25 PROCEDURE — 83735 ASSAY OF MAGNESIUM: CPT | Performed by: INTERNAL MEDICINE

## 2022-07-25 PROCEDURE — 84132 ASSAY OF SERUM POTASSIUM: CPT | Performed by: FAMILY MEDICINE

## 2022-07-25 PROCEDURE — 84443 ASSAY THYROID STIM HORMONE: CPT | Performed by: FAMILY MEDICINE

## 2022-07-25 PROCEDURE — 250N000011 HC RX IP 250 OP 636: Performed by: HOSPITALIST

## 2022-07-25 PROCEDURE — 82550 ASSAY OF CK (CPK): CPT | Performed by: FAMILY MEDICINE

## 2022-07-25 PROCEDURE — 80202 ASSAY OF VANCOMYCIN: CPT | Performed by: FAMILY MEDICINE

## 2022-07-25 PROCEDURE — 82746 ASSAY OF FOLIC ACID SERUM: CPT | Performed by: FAMILY MEDICINE

## 2022-07-25 PROCEDURE — 82607 VITAMIN B-12: CPT | Performed by: FAMILY MEDICINE

## 2022-07-25 PROCEDURE — 82140 ASSAY OF AMMONIA: CPT | Performed by: FAMILY MEDICINE

## 2022-07-25 PROCEDURE — 94799 UNLISTED PULMONARY SVC/PX: CPT

## 2022-07-25 PROCEDURE — 99223 1ST HOSP IP/OBS HIGH 75: CPT | Performed by: PSYCHIATRY & NEUROLOGY

## 2022-07-25 RX ORDER — POTASSIUM CHLORIDE 7.45 MG/ML
10 INJECTION INTRAVENOUS
Status: COMPLETED | OUTPATIENT
Start: 2022-07-25 | End: 2022-07-25

## 2022-07-25 RX ORDER — LORAZEPAM 2 MG/ML
1 CONCENTRATE ORAL DAILY PRN
Status: DISCONTINUED | OUTPATIENT
Start: 2022-07-25 | End: 2022-07-26

## 2022-07-25 RX ORDER — VANCOMYCIN HYDROCHLORIDE 1 G/200ML
1000 INJECTION, SOLUTION INTRAVENOUS EVERY 12 HOURS
Status: DISCONTINUED | OUTPATIENT
Start: 2022-07-25 | End: 2022-07-27 | Stop reason: HOSPADM

## 2022-07-25 RX ORDER — MAGNESIUM SULFATE 4 G/50ML
4 INJECTION INTRAVENOUS ONCE
Status: COMPLETED | OUTPATIENT
Start: 2022-07-25 | End: 2022-07-25

## 2022-07-25 RX ORDER — SENNOSIDES 8.6 MG
8.6 TABLET ORAL 2 TIMES DAILY
Status: DISCONTINUED | OUTPATIENT
Start: 2022-07-25 | End: 2022-07-27 | Stop reason: HOSPADM

## 2022-07-25 RX ADMIN — VANCOMYCIN HYDROCHLORIDE 750 MG: 1 INJECTION, POWDER, LYOPHILIZED, FOR SOLUTION INTRAVENOUS at 05:28

## 2022-07-25 RX ADMIN — TOPIRAMATE 50 MG: 25 TABLET, FILM COATED ORAL at 21:48

## 2022-07-25 RX ADMIN — POTASSIUM CHLORIDE 10 MEQ: 7.46 INJECTION, SOLUTION INTRAVENOUS at 10:23

## 2022-07-25 RX ADMIN — VANCOMYCIN HYDROCHLORIDE 1000 MG: 1 INJECTION, SOLUTION INTRAVENOUS at 18:11

## 2022-07-25 RX ADMIN — PANTOPRAZOLE SODIUM 40 MG: 20 TABLET, DELAYED RELEASE ORAL at 16:42

## 2022-07-25 RX ADMIN — MEROPENEM 1 G: 1 INJECTION, POWDER, FOR SOLUTION INTRAVENOUS at 08:30

## 2022-07-25 RX ADMIN — REMDESIVIR 100 MG: 100 INJECTION, POWDER, LYOPHILIZED, FOR SOLUTION INTRAVENOUS at 21:49

## 2022-07-25 RX ADMIN — LIDOCAINE 1 PATCH: 246 PATCH TOPICAL at 16:41

## 2022-07-25 RX ADMIN — POTASSIUM CHLORIDE 10 MEQ: 7.46 INJECTION, SOLUTION INTRAVENOUS at 13:36

## 2022-07-25 RX ADMIN — IPRATROPIUM BROMIDE AND ALBUTEROL 2 PUFF: 20; 100 SPRAY, METERED RESPIRATORY (INHALATION) at 22:08

## 2022-07-25 RX ADMIN — SENNOSIDES 8.6 MG: 8.6 TABLET, FILM COATED ORAL at 21:47

## 2022-07-25 RX ADMIN — IPRATROPIUM BROMIDE AND ALBUTEROL 2 PUFF: 20; 100 SPRAY, METERED RESPIRATORY (INHALATION) at 15:05

## 2022-07-25 RX ADMIN — POTASSIUM CHLORIDE 10 MEQ: 7.46 INJECTION, SOLUTION INTRAVENOUS at 09:13

## 2022-07-25 RX ADMIN — MEROPENEM 1 G: 1 INJECTION, POWDER, FOR SOLUTION INTRAVENOUS at 17:58

## 2022-07-25 RX ADMIN — POTASSIUM CHLORIDE 10 MEQ: 7.46 INJECTION, SOLUTION INTRAVENOUS at 15:04

## 2022-07-25 RX ADMIN — MAGNESIUM SULFATE HEPTAHYDRATE 4 G: 80 INJECTION, SOLUTION INTRAVENOUS at 08:20

## 2022-07-25 RX ADMIN — ENOXAPARIN SODIUM 40 MG: 40 INJECTION SUBCUTANEOUS at 05:32

## 2022-07-25 RX ADMIN — LEVETIRACETAM 500 MG: 500 TABLET ORAL at 21:48

## 2022-07-25 RX ADMIN — SENNOSIDES 8.6 MG: 8.6 TABLET, FILM COATED ORAL at 17:20

## 2022-07-25 RX ADMIN — IPRATROPIUM BROMIDE AND ALBUTEROL 2 PUFF: 20; 100 SPRAY, METERED RESPIRATORY (INHALATION) at 08:18

## 2022-07-25 RX ADMIN — PANTOPRAZOLE SODIUM 40 MG: 20 TABLET, DELAYED RELEASE ORAL at 06:43

## 2022-07-25 RX ADMIN — MEROPENEM 1 G: 1 INJECTION, POWDER, FOR SOLUTION INTRAVENOUS at 00:37

## 2022-07-25 RX ADMIN — ACETAMINOPHEN 650 MG: 325 TABLET ORAL at 13:51

## 2022-07-25 RX ADMIN — POTASSIUM CHLORIDE 10 MEQ: 7.46 INJECTION, SOLUTION INTRAVENOUS at 12:35

## 2022-07-25 RX ADMIN — NORTRIPTYLINE HYDROCHLORIDE 50 MG: 50 CAPSULE ORAL at 08:20

## 2022-07-25 RX ADMIN — POTASSIUM CHLORIDE 10 MEQ: 7.46 INJECTION, SOLUTION INTRAVENOUS at 11:33

## 2022-07-25 RX ADMIN — LEVETIRACETAM 500 MG: 500 TABLET ORAL at 08:19

## 2022-07-25 RX ADMIN — IPRATROPIUM BROMIDE AND ALBUTEROL 2 PUFF: 20; 100 SPRAY, METERED RESPIRATORY (INHALATION) at 13:36

## 2022-07-25 RX ADMIN — OXCARBAZEPINE 450 MG: 150 TABLET, FILM COATED ORAL at 21:48

## 2022-07-25 ASSESSMENT — ACTIVITIES OF DAILY LIVING (ADL)
ADLS_ACUITY_SCORE: 31
ADLS_ACUITY_SCORE: 29
ADLS_ACUITY_SCORE: 31
ADLS_ACUITY_SCORE: 29
ADLS_ACUITY_SCORE: 31
ADLS_ACUITY_SCORE: 31
ADLS_ACUITY_SCORE: 29
ADLS_ACUITY_SCORE: 31
ADLS_ACUITY_SCORE: 31

## 2022-07-25 NOTE — PROGRESS NOTES
[facility]    Medicine Progress Note - Hospitalist Service       Date of Admission:  7/19/2022  Principal Problem:    Infection due to 2019 novel coronavirus  Active Problems:    Sepsis (H)    Sepsis, due to unspecified organism, unspecified whether acute organ dysfunction present (H)    ORTIZ (acute kidney injury) (H)     Assessment & Plan          77-year-old female with a Past medical history of trigeminal neuralgia, chronic pain, migraines, hyperlipidemia, hypercalcemia, iron deficiency anemia, depression, pyloric ulcer, intermittent dysphagia admitted for weakness, diarrhea, body aches, cough, fever found to have COVID-19, sepsis, dysphagia, bacterial pneumonia suspected to be aspiration, encephalopathy/weakness.     Confirmed COVID-19 infection                Viral Pneumonia secondary to COVID-19 infection              Bacterial Superinfection              Severe sepsis due to Pneumonia- concern for aspiration PNA/bacterial pneumonia              Fully vaccinated with booster              Bilateral lower lobe infiltrates seen but no hypoxia     Remdesivir initiated 7/19, not a candidate for dexamethasone due to lack of hypoxia              IV fluids discontinued              Meropenem and vancomycin initiated 7/19.  MRSA nasal screen positive, procalcitonin elevated              ECHO 7/21 with intact EF. Previous EF of 70-75%   Appreciate ID consultation, continuing meropenem and vancomycin.   Has mild cough but otherwise no pulmonary symptoms, encourage I-S, start flutter valve.  Unable to self prone  CRP improving, follow CRP and D-dimer.  Had fever temperature 100.9 last night.     New onset delirium and encephalopathy/weakness secondary to above              mental status has improved since admission but still very weak    initially holding home topiramate, nortriptyline, Keppra and Trileptal secondary to somnolence.,  Now restarted since mental status improved but still very weak.   Also on  Tylenol 3   Suspect polypharmacy contributing to fatigue/mental status.  Nonfocal neurologic exam.    Check TSH, B12, folate, CK, ammonia, head CT   Patient on nortriptyline, topiramate and Trileptal and Tylenol 3 likely for trigeminal neuralgia, difficult to tell however in previous notes    Trial of holding nortriptyline, minimize Tylenol 3    On Keppra for history of asterixis on 2/22 started by neurology but no follow-up, question if can discontinue or taper.    Consult neurology and pain team, regarding polypharmacy and discontinuation of Keppra and decreasing T3 likely due to- now mental status improve, restart these home  medications on 7/24     Diarrhea- improved, now constipated              CT scan showed no acute abnormality in the abdomen or pelvis              Likely related to COVID infection  Start senna                 Dysphagia              Reports occasional coughing with meals              Suspected aspiration in February 2022, video swallow at that time showed trace amount of aspiration with thin barium consistency              Video swallow 7/20 with some aspiration with thin liquids              Speech recommending minced and moist diet level 5 and mildly thick liquid.  Tolerating.     Acute urinary retention               Likely related to acute illness and delirium               Abrams catheter placed 7/21- PEDRO prior to discharge   UA shows no signs of infection, CT shows no abnormalities     Acute kidney injury- resolved with IVF               Secondary to volume depletion              Discontinue IV fluid              Monitor for volume overload               Avoid nephrotoxins including NSAIDs     Hypokalemia              Secondary to diarrhea              Replace per protocol-     Hyperglycemia- improved without insulin               Continue to monitor              A1c of 5.6      Trigeminal neuralgia, chronic pain  Patient on Tylenol 3, Keppra, topiramate, Trileptal,  nortriptyline.  Suspect all of these may be contributing to her encephalopathy.  We will ask pain team and neurology to see, hold Keppra and nortriptyline.       Depression              Did not resume mirtazapine as patient reportedly stopped taking this medication due to somnolence               Resumed home nortriptyline-holding for now given her encephalopathy     Iron deficiency anemia              Decreased a little today.  No signs of GI loss.  Recheck.     General weakness  -poor appetite and oral intake  -encourage oral nutrition  -add supplement  -pt/ot eval  -as per daughter, patient can walk without assistance at baseline     Barriers to discharge: Improvement in mentation, ID recs, IV medication     Anticipated length of stay: Several days        Diet: Minced & Moist Diet (level 5) Mildly Thick (level 2)  Snacks/Supplements Adult: Ensure Enlive; Between Meals    DVT Prophylaxis: Enoxaparin (Lovenox) SQ  Abrams Catheter: PRESENT, indication: Retention  Central Lines: PRESENT  PICC Triple Lumen 07/22/22 Right Basilic Vascular access-Site Assessment: WDL  Code Status: Full Code      Disposition Plan      Expected Discharge Date: 07/27/2022    Discharge Delays: IV Medication - consider oral or Home Infusion    Discharge Comments: PICC line placed 7/22- IV abx        The patient's care was discussed with the Bedside Nurse, Patient and Tried to contact patient's daughter. for total time 40 minutes with greater than 50% of total time spent in counseling and coordination of care.    MIRIAN MENDEZ MD  Hospitalist Service  [facility]  Securely message with the Vocera Web Console (learn more here)  Text page via [employer] Paging/Directory        Clinically Significant Risk Factors Present on Admission                      ______________________________________________________________________    Interval History   Remainder of 12 point review of systems negative except as noted  "below    Subjective:  Patient according to staff much less confused but still quite fatigued.  Will open eyes and answer questions appropriately.  Feels tired all over, denies headache or specific body aches or pains.  No hallucinations.  Eating a little.  Voiding.  No bowel movement for several days.  No chest pain or shortness of breath.  No abdominal pain.    Data reviewed today: I reviewed all medications, new labs and imaging results over the last 24 hours.     Physical Exam   Vital Signs: Temp: 98.9  F (37.2  C) Temp src: Oral BP: 123/76 Pulse: 102   Resp: 20 SpO2: 97 % O2 Device: None (Room air)    Weight: 120 lbs 5.94 oz  Physical Exam:  Temp:  [98.9  F (37.2  C)-100.9  F (38.3  C)] 98.9  F (37.2  C)  Pulse:  [102-107] 102  Resp:  [20-22] 20  BP: (109-151)/(59-76) 123/76  SpO2:  [95 %-97 %] 97 %    /76 (BP Location: Left arm)   Pulse 102   Temp 98.9  F (37.2  C) (Oral)   Resp 20   Ht 1.651 m (5' 5\")   Wt 54.6 kg (120 lb 5.9 oz)   SpO2 97%   BMI 20.03 kg/m    General appearance: alert, appears stated age, cachectic, cooperative and slowed mentation  Head: Normocephalic, without obvious abnormality, atraumatic  Eyes: Clear conjuctiva  Neck: no JVD and supple, symmetrical, trachea midline  Lungs: clear to auscultation bilaterally  Heart: regular rate and rhythm, S1, S2 normal, no murmur, click, rub or gallop  Abdomen: soft, non-tender; bowel sounds normal; no masses,  no organomegaly  Extremities: Negative homans,   Skin: Skin color, texture, turgor normal. No rashes or lesions  Neurologic: Mental status: alertness: Sleepy but arousable  Cranial nerves: normal  Sensory: normal  Motor:grossly normal          Data   Recent Labs   Lab 07/25/22  0619 07/24/22  0643 07/23/22  0734 07/22/22  1941 07/22/22  0849 07/20/22  0923 07/19/22  2255 07/19/22  1901   WBC 7.2 10.5 12.2*  --  17.3*   < >  --  32.5*   HGB 8.1* 9.7* 8.7*  --  9.3*   < >  --  11.7   MCV 73* 71* 72*  --  73*   < >  --  72*    416 " 391  --  387   < >  --  647*   INR  --   --   --   --   --   --  1.36*  --    *  --  135*  --  142   < >  --  136   POTASSIUM 2.8* 3.9 3.7   < > 3.4*   < >  --  3.4*   CHLORIDE 105  --  104  --  110*   < >  --  99   CO2 22  --  27  --  23   < >  --  23   BUN 9  --  13  --  19   < >  --  28   CR 0.44* 0.56* 0.57*  --  0.64   < >  --  1.11*   ANIONGAP 5  --  4*  --  9   < >  --  14   ADY 6.7*  --  8.4*  --  8.8   < >  --  10.1   GLC 92  --  88  --  117   < >  --  252*   ALBUMIN  --   --  2.3*  --  2.4*   < >  --  3.6   PROTTOTAL  --   --  6.0  --  6.5   < >  --  8.9*   BILITOTAL  --   --  0.4  --  0.3   < >  --  0.5   ALKPHOS  --   --  71  --  86   < >  --  134*   ALT  --   --  34  --  31   < >  --  31   AST  --   --  30  --  30   < >  --  25   LIPASE  --   --   --   --   --   --   --  <9    < > = values in this interval not displayed.     Recent Results (from the past 24 hour(s))   XR Chest Port 1 View    Narrative    EXAM: XR CHEST PORT 1 VIEW  LOCATION: [facility]  DATE/TIME: 7/25/2022 2:19 PM    INDICATION: followup pneumonia  COMPARISON: 07/19/2022      Impression    IMPRESSION: A right-sided PICC line is identified with the tip at the SVC/right atrial junction. The heart is normal in size. No pleural effusion or pneumothorax is identified. No pneumothorax is identified.

## 2022-07-25 NOTE — SIGNIFICANT EVENT
Received call from radiologist about frontal lesion with edema.  Discussed with neurology who will see patient, MRI of head with and without contrast ordered.  Continue Keppra.  EEG pending.

## 2022-07-25 NOTE — PROGRESS NOTES
Will not see today:  PAIN MANAGEMENT SERVICE CHART CHECK    Pain consult noted this afternoon. Discussed with Hospital Medicine Service and will see in AM.       ANGELA Taylor-C  Acute Care Pain Management Program  [facility] ([facility], [place], [place])  Monday-Friday 8a-4p   Page via online paging system  or call [phone]

## 2022-07-25 NOTE — PHARMACY-VANCOMYCIN DOSING SERVICE
Pharmacy Vancomycin Note  Date of Service 2022  Patient's  1945   77 year old, female    Indication: Healthcare-Associated Pneumonia  Day of Therapy: 6  Current vancomycin regimen:  750 mg IV q12h  Current vancomycin monitoring method: AUC  Current vancomycin therapeutic monitoring goal: 400-600 mg*h/L    InsightRX Prediction of Current Vancomycin Regimen  Loading dose: N/A  Regimen: 750 mg IV every 12 hours.  Start time: 16:10 on 2022  Exposure target: AUC24 (range)400-600 mg/L.hr   AUC24,ss: 348 mg/L.hr  Probability of AUC24 > 400: 18 %  Ctrough,ss: 9.5 mg/L  Probability of Ctrough,ss > 20: 0 %  Probability of nephrotoxicity (Lodise ESTIVEN ): 5 %    Current estimated CrCl = Estimated Creatinine Clearance: 92.3 mL/min (A) (based on SCr of 0.44 mg/dL (L)).    Creatinine for last 3 days  2022:  7:34 AM Creatinine 0.57 mg/dL  2022:  6:43 AM Creatinine 0.56 mg/dL  2022:  6:19 AM Creatinine 0.44 mg/dL    Recent Vancomycin Levels (past 3 days)  2022:  1:58 PM Vancomycin 14.8 mg/L    Vancomycin IV Administrations (past 72 hours)                   vancomycin (VANCOCIN) 750 mg in sodium chloride 0.9 % 250 mL intermittent infusion (mg) 750 mg New Bag 22 0528     750 mg New Bag 22 1737     750 mg New Bag  0643     750 mg New Bag 22 1727     750 mg New Bag  0718     750 mg New Bag 22 1711                Nephrotoxins and other renal medications (From now, onward)    Start     Dose/Rate Route Frequency Ordered Stop    22 1800  vancomycin (VANCOCIN) 750 mg in sodium chloride 0.9 % 250 mL intermittent infusion         750 mg  over 60-90 Minutes Intravenous EVERY 12 HOURS 22 1448               Contrast Orders - past 72 hours (72h ago, onward)    None          Interpretation of levels and current regimen:  Vancomycin level is reflective of AUC less than 400    Has serum creatinine changed greater than 50% in last 72 hours: No    Urine output:  good urine  output    Renal Function: Stable    InsightRX Prediction of Planned New Vancomycin Regimen  Loading dose: N/A  Regimen: 1000 mg IV every 12 hours.  Start time: 16:10 on 07/25/2022  Exposure target: AUC24 (range)400-600 mg/L.hr   AUC24,ss: 462 mg/L.hr  Probability of AUC24 > 400: 83 %  Ctrough,ss: 12.8 mg/L  Probability of Ctrough,ss > 20: 1 %  Probability of nephrotoxicity (Lodise ESTIVEN 2009): 8 %      Plan:  1. Increase Dose to 1000 mg IV every 12 hours  2. Vancomycin monitoring method: AUC  3. Vancomycin therapeutic monitoring goal: 400-600 mg*h/L  4. Pharmacy will check vancomycin levels as appropriate in 1-3 Days.    DAJA DAVILA [facility]

## 2022-07-25 NOTE — PROGRESS NOTES
[facility]  Infectious Disease  Progress Note     Date of Admission:  7/19/2022    Assessment & Plan   COVID19 pneumonia, vaccinated, syx 7/15  Bacterial pneumonia as PCT is high at 2  Trace amount of aspiration with thin barium consistency  MRSA screen positive  PCN allergy, rash  encephalopathy seems improved, but still looks weak, little appetite  Leukocytosis down, CRP down, not on oxygen sat 98%! This is sec to control of bacterial infection. So fever likely from covid.       PLAN  CXR  On vanco meropenem  Treat x 8 days-till 7/27  RDV got 3, complete 5 days  Not on dexa since not hypoxic  Trend fevers and inflammatory markers, PCT    Faiza Chandler M.D.    ______________________________________________________________________    Interval History   Headache  Alert  Looks weak    All 12 systems reviewed, negative except for the above.  Reliability?    Physical Exam   Vital Signs: Temp: 98.9  F (37.2  C) Temp src: Oral BP: 123/76 Pulse: 102   Resp: 20 SpO2: 97 % O2 Device: None (Room air)    Weight: 120 lbs 5.94 oz  Gen. appearance nontoxic  Eyes no conjunctivitis or icterus  Neck no stiffness or neck vein distention, no LN  Heart  No edema  Lungs breathing comfortably  Abdomen soft not tender  Extremities no synovitis, trace edema  Skin  no rash or emboli  Neurologic alert oriented no focal deficits oriented to two no tremors    Data   Results for orders placed or performed during the hospital encounter of 07/19/22 (from the past 24 hour(s))   CBC with platelets   Result Value Ref Range    WBC Count 7.2 4.0 - 11.0 10e3/uL    RBC Count 3.93 3.80 - 5.20 10e6/uL    Hemoglobin 8.1 (L) 11.7 - 15.7 g/dL    Hematocrit 28.6 (L) 35.0 - 47.0 %    MCV 73 (L) 78 - 100 fL    MCH 20.6 (L) 26.5 - 33.0 pg    MCHC 28.3 (L) 31.5 - 36.5 g/dL    RDW 18.0 (H) 10.0 - 15.0 %    Platelet Count 386 150 - 450 10e3/uL   Basic metabolic panel   Result Value Ref Range    Sodium 132 (L) 136 - 145 mmol/L     Potassium 2.8 (LL) 3.5 - 5.0 mmol/L    Chloride 105 98 - 107 mmol/L    Carbon Dioxide (CO2) 22 22 - 31 mmol/L    Anion Gap 5 5 - 18 mmol/L    Urea Nitrogen 9 8 - 28 mg/dL    Creatinine 0.44 (L) 0.60 - 1.10 mg/dL    Calcium 6.7 (L) 8.5 - 10.5 mg/dL    Glucose 92 70 - 125 mg/dL    GFR Estimate >90 >60 mL/min/1.73m2   Magnesium   Result Value Ref Range    Magnesium 1.6 (L) 1.8 - 2.6 mg/dL   Vitamin B12   Result Value Ref Range    Vitamin B12 1,536 (H) 232 - 1,245 pg/mL   Folate   Result Value Ref Range    Folic Acid 19.5 4.6 - 34.8 ng/mL   TSH with free T4 reflex   Result Value Ref Range    TSH 0.56 0.30 - 5.00 uIU/mL   CRP inflammation   Result Value Ref Range    CRP 3.7 (H) 0.0 - <0.8 mg/dL

## 2022-07-25 NOTE — PLAN OF CARE
Problem: Infection (Pneumonia)  Goal: Resolution of Infection Signs and Symptoms  Outcome: Ongoing, Progressing  Intervention: Prevent Infection Progression  Recent Flowsheet Documentation  Taken 7/25/2022 0031 by Bobby Guerrero RN  Isolation Precautions: airborne precautions maintained     Problem: Pain Chronic (Persistent) (Comorbidity Management)  Goal: Acceptable Pain Control and Functional Ability  Outcome: Ongoing, Progressing   Goal Outcome Evaluation:      Pt is alert and oriented, lidocaine patch in place for chronic back pain. Abrams cath draining properly. She has been sleeping between cares. PICC line intact. Call light within reach.

## 2022-07-25 NOTE — PROGRESS NOTES
Occupational Therapy    Discussed w/PT pt's current condition and they felt pt is not alert enough to participate in OT at this time.  Please re-order OT when pt is able to participate.

## 2022-07-25 NOTE — PROGRESS NOTES
07/25/22 1100   Quick Adds   Type of Visit Initial PT Evaluation   Living Environment   People in Home grandchild(geetha)  (Grandson)   Current Living Arrangements house   Living Environment Comments Per chart, pt unable to answer hx questions during eval.   General Information   Onset of Illness/Injury or Date of Surgery 07/19/22   Referring Physician Elana Warren MD   Patient/Family Therapy Goals Statement (PT) none   Pertinent History of Current Problem (include personal factors and/or comorbidities that impact the POC) 77 year old female admitted on 7/19/2022. She came to the ED for evaluation of diarrhea, fevers, generalized weakness, lightheadedness, fall. Covid +, pneumonia.   Existing Precautions/Restrictions fall   Cognition   Affect/Mental Status (Cognition) confused;low arousal/lethargic   Follows Commands (Cognition) does not follow one-step commands   Strength (Manual Muscle Testing)   Strength (Manual Muscle Testing) Deficits observed during functional mobility   Bed Mobility   Bed Mobility scooting/bridging;supine-sit;sit-supine   Scooting/Bridging [place] (Bed Mobility) dependent (less than 25% patient effort)   Supine-Sit [place] (Bed Mobility) maximum assist (25% patient effort)   Sit-Supine [place] (Bed Mobility) maximum assist (25% patient effort)   Bed Mobility Limitations cognitive deficits;decreased ability to use arms for pushing/pulling;decreased ability to use legs for bridging/pushing;impaired ability to control trunk for mobility   Impairments Contributing to Impaired Bed Mobility impaired balance;decreased strength;impaired postural control   Assistive Device (Bed Mobility) bed rails;draw sheet   Transfers   Comment, (Transfers) Unable to attempt transfers, poor sitting balance/tolerance.   Gait/Stairs (Locomotion)   Comment, (Gait/Stairs) n/a   Clinical Impression   Criteria for Skilled Therapeutic Intervention Yes, treatment indicated   PT Diagnosis (PT) Impaired  functional mobility   Influenced by the following impairments Weakness, cognition   Functional limitations due to impairments Impaired strength, impaired transfers, impaired gait   Clinical Presentation (PT Evaluation Complexity) Evolving/Changing   Clinical Presentation Rationale Presents as diagnosed   Clinical Decision Making (Complexity) moderate complexity   Planned Therapy Interventions (PT) balance training;bed mobility training;gait training;strengthening;transfer training   Anticipated Equipment Needs at Discharge (PT) lift device   Risk & Benefits of therapy have been explained patient   PT Discharge Planning   PT Discharge Recommendation (DC Rec) Transitional Care Facility   PT Rationale for DC Rec Low activity tolerance. Unable to transfer yet.   Total Evaluation Time   Total Evaluation Time (Minutes) 8   Physical Therapy Goals   PT Frequency 3x/week   PT Predicted Duration/Target Date for Goal Attainment 08/05/22   PT Goals Bed Mobility;Transfers   PT: Bed Mobility Minimal assist;Supine to/from sit   PT: Transfers Minimal assist;Sit to/from stand;Bed to/from chair;Assistive device       Jackie Lucero, PT, DPT  7/25/2022

## 2022-07-25 NOTE — PLAN OF CARE
C/o generalized pain, mainly in neck. Lidocaine patch and tylenol given. Encouraged activity, pt participated in repositioning but refused to get OOB. Refused HS cares. LS clear, RLL crackles. Inhaler in use. Encouraged appetite, pt ate bites of dinner. Drinking some fluids.     Family dropped off pt's glasses.    Bed alarm on, call light within reach.

## 2022-07-26 ENCOUNTER — APPOINTMENT (OUTPATIENT)
Dept: NEUROLOGY | Facility: HOSPITAL | Age: 77
DRG: 871 | End: 2022-07-26
Attending: PSYCHIATRY & NEUROLOGY
Payer: COMMERCIAL

## 2022-07-26 LAB
ANION GAP SERPL CALCULATED.3IONS-SCNC: 9 MMOL/L (ref 5–18)
BUN SERPL-MCNC: 10 MG/DL (ref 8–28)
C REACTIVE PROTEIN LHE: 4.6 MG/DL (ref 0–?)
CALCIUM SERPL-MCNC: 8.1 MG/DL (ref 8.5–10.5)
CALCIUM, IONIZED MEASURED: 1.04 MMOL/L (ref 1.11–1.3)
CHLORIDE BLD-SCNC: 96 MMOL/L (ref 98–107)
CO2 SERPL-SCNC: 24 MMOL/L (ref 22–31)
CREAT SERPL-MCNC: 0.51 MG/DL (ref 0.6–1.1)
D DIMER PPP FEU-MCNC: 0.82 UG/ML FEU (ref 0–0.5)
ERYTHROCYTE [DISTWIDTH] IN BLOOD BY AUTOMATED COUNT: 18 % (ref 10–15)
GFR SERPL CREATININE-BSD FRML MDRD: >90 ML/MIN/1.73M2
GLUCOSE BLD-MCNC: 103 MG/DL (ref 70–125)
HCT VFR BLD AUTO: 32.6 % (ref 35–47)
HGB BLD-MCNC: 9.5 G/DL (ref 11.7–15.7)
ION CA PH 7.4: 1.07 MMOL/L (ref 1.11–1.3)
LEVETIRACETAM SERPL-MCNC: 6 ΜG/ML (ref 10–40)
MAGNESIUM SERPL-MCNC: 2.3 MG/DL (ref 1.8–2.6)
MCH RBC QN AUTO: 20.5 PG (ref 26.5–33)
MCHC RBC AUTO-ENTMCNC: 29.1 G/DL (ref 31.5–36.5)
MCV RBC AUTO: 70 FL (ref 78–100)
NORTRIP SERPL-MCNC: 144 NG/ML
PH: 7.44 (ref 7.35–7.45)
PLATELET # BLD AUTO: 433 10E3/UL (ref 150–450)
POTASSIUM BLD-SCNC: 3.7 MMOL/L (ref 3.5–5)
PROCALCITONIN SERPL IA-MCNC: 0.21 NG/ML
RBC # BLD AUTO: 4.63 10E6/UL (ref 3.8–5.2)
SODIUM SERPL-SCNC: 129 MMOL/L (ref 136–145)
SODIUM UR-SCNC: 124 MMOL/L
WBC # BLD AUTO: 13.6 10E3/UL (ref 4–11)

## 2022-07-26 PROCEDURE — 250N000011 HC RX IP 250 OP 636: Performed by: FAMILY MEDICINE

## 2022-07-26 PROCEDURE — 258N000003 HC RX IP 258 OP 636: Performed by: FAMILY MEDICINE

## 2022-07-26 PROCEDURE — 99233 SBSQ HOSP IP/OBS HIGH 50: CPT | Mod: FS | Performed by: FAMILY MEDICINE

## 2022-07-26 PROCEDURE — 250N000013 HC RX MED GY IP 250 OP 250 PS 637: Performed by: INTERNAL MEDICINE

## 2022-07-26 PROCEDURE — 83935 ASSAY OF URINE OSMOLALITY: CPT | Performed by: INTERNAL MEDICINE

## 2022-07-26 PROCEDURE — 250N000011 HC RX IP 250 OP 636: Performed by: INTERNAL MEDICINE

## 2022-07-26 PROCEDURE — 82330 ASSAY OF CALCIUM: CPT | Performed by: FAMILY MEDICINE

## 2022-07-26 PROCEDURE — 80048 BASIC METABOLIC PNL TOTAL CA: CPT | Performed by: FAMILY MEDICINE

## 2022-07-26 PROCEDURE — 83735 ASSAY OF MAGNESIUM: CPT | Performed by: FAMILY MEDICINE

## 2022-07-26 PROCEDURE — 95816 EEG AWAKE AND DROWSY: CPT | Mod: 26 | Performed by: PSYCHIATRY & NEUROLOGY

## 2022-07-26 PROCEDURE — 84300 ASSAY OF URINE SODIUM: CPT | Performed by: FAMILY MEDICINE

## 2022-07-26 PROCEDURE — 250N000013 HC RX MED GY IP 250 OP 250 PS 637: Performed by: PSYCHIATRY & NEUROLOGY

## 2022-07-26 PROCEDURE — 120N000001 HC R&B MED SURG/OB

## 2022-07-26 PROCEDURE — 85027 COMPLETE CBC AUTOMATED: CPT | Performed by: HOSPITALIST

## 2022-07-26 PROCEDURE — 85379 FIBRIN DEGRADATION QUANT: CPT | Performed by: FAMILY MEDICINE

## 2022-07-26 PROCEDURE — 250N000011 HC RX IP 250 OP 636: Performed by: HOSPITALIST

## 2022-07-26 PROCEDURE — 99222 1ST HOSP IP/OBS MODERATE 55: CPT | Performed by: NURSE PRACTITIONER

## 2022-07-26 PROCEDURE — 99233 SBSQ HOSP IP/OBS HIGH 50: CPT | Performed by: PSYCHIATRY & NEUROLOGY

## 2022-07-26 PROCEDURE — 99207 PR APP CREDIT; MD BILLING SHARED VISIT: CPT | Mod: FS | Performed by: INTERNAL MEDICINE

## 2022-07-26 PROCEDURE — 80177 DRUG SCRN QUAN LEVETIRACETAM: CPT | Performed by: PSYCHIATRY & NEUROLOGY

## 2022-07-26 PROCEDURE — 258N000003 HC RX IP 258 OP 636: Performed by: INTERNAL MEDICINE

## 2022-07-26 PROCEDURE — 99207 PR CDG-CUT & PASTE-POTENTIAL IMPACT ON LEVEL: CPT | Performed by: INTERNAL MEDICINE

## 2022-07-26 PROCEDURE — 99233 SBSQ HOSP IP/OBS HIGH 50: CPT | Performed by: INTERNAL MEDICINE

## 2022-07-26 PROCEDURE — 95816 EEG AWAKE AND DROWSY: CPT

## 2022-07-26 PROCEDURE — 250N000013 HC RX MED GY IP 250 OP 250 PS 637: Performed by: HOSPITALIST

## 2022-07-26 PROCEDURE — 86140 C-REACTIVE PROTEIN: CPT | Performed by: FAMILY MEDICINE

## 2022-07-26 PROCEDURE — 250N000013 HC RX MED GY IP 250 OP 250 PS 637: Performed by: FAMILY MEDICINE

## 2022-07-26 RX ORDER — BISACODYL 10 MG
10 SUPPOSITORY, RECTAL RECTAL ONCE
Status: COMPLETED | OUTPATIENT
Start: 2022-07-26 | End: 2022-07-26

## 2022-07-26 RX ORDER — LEVETIRACETAM 500 MG/1
1000 TABLET ORAL ONCE
Status: COMPLETED | OUTPATIENT
Start: 2022-07-26 | End: 2022-07-26

## 2022-07-26 RX ORDER — CALCIUM GLUCONATE 20 MG/ML
1 INJECTION, SOLUTION INTRAVENOUS ONCE
Status: COMPLETED | OUTPATIENT
Start: 2022-07-26 | End: 2022-07-26

## 2022-07-26 RX ORDER — LEVETIRACETAM 500 MG/1
1000 TABLET ORAL 2 TIMES DAILY
Status: DISCONTINUED | OUTPATIENT
Start: 2022-07-26 | End: 2022-07-27 | Stop reason: HOSPADM

## 2022-07-26 RX ORDER — SODIUM CHLORIDE 9 MG/ML
INJECTION, SOLUTION INTRAVENOUS CONTINUOUS
Status: DISCONTINUED | OUTPATIENT
Start: 2022-07-26 | End: 2022-07-26

## 2022-07-26 RX ORDER — METRONIDAZOLE 500 MG/1
500 TABLET ORAL 2 TIMES DAILY
Status: DISCONTINUED | OUTPATIENT
Start: 2022-07-26 | End: 2022-07-27 | Stop reason: HOSPADM

## 2022-07-26 RX ORDER — POTASSIUM CHLORIDE 1.5 G/1.58G
20 POWDER, FOR SOLUTION ORAL ONCE
Status: COMPLETED | OUTPATIENT
Start: 2022-07-26 | End: 2022-07-26

## 2022-07-26 RX ADMIN — METRONIDAZOLE 500 MG: 500 TABLET ORAL at 20:42

## 2022-07-26 RX ADMIN — IPRATROPIUM BROMIDE AND ALBUTEROL 2 PUFF: 20; 100 SPRAY, METERED RESPIRATORY (INHALATION) at 09:07

## 2022-07-26 RX ADMIN — LEVETIRACETAM 500 MG: 500 TABLET ORAL at 11:41

## 2022-07-26 RX ADMIN — ACETAMINOPHEN 650 MG: 325 TABLET ORAL at 17:16

## 2022-07-26 RX ADMIN — IPRATROPIUM BROMIDE AND ALBUTEROL 2 PUFF: 20; 100 SPRAY, METERED RESPIRATORY (INHALATION) at 15:14

## 2022-07-26 RX ADMIN — MEROPENEM 1 G: 1 INJECTION, POWDER, FOR SOLUTION INTRAVENOUS at 01:22

## 2022-07-26 RX ADMIN — SODIUM CHLORIDE: 9 INJECTION, SOLUTION INTRAVENOUS at 11:42

## 2022-07-26 RX ADMIN — SENNOSIDES 8.6 MG: 8.6 TABLET, FILM COATED ORAL at 20:39

## 2022-07-26 RX ADMIN — LEVETIRACETAM 1000 MG: 500 TABLET, FILM COATED ORAL at 20:39

## 2022-07-26 RX ADMIN — PANTOPRAZOLE SODIUM 40 MG: 20 TABLET, DELAYED RELEASE ORAL at 16:20

## 2022-07-26 RX ADMIN — SENNOSIDES 8.6 MG: 8.6 TABLET, FILM COATED ORAL at 09:08

## 2022-07-26 RX ADMIN — ENOXAPARIN SODIUM 40 MG: 40 INJECTION SUBCUTANEOUS at 09:07

## 2022-07-26 RX ADMIN — VANCOMYCIN HYDROCHLORIDE 1000 MG: 1 INJECTION, SOLUTION INTRAVENOUS at 03:41

## 2022-07-26 RX ADMIN — ACETAMINOPHEN 650 MG: 325 TABLET ORAL at 09:07

## 2022-07-26 RX ADMIN — MEROPENEM 1 G: 1 INJECTION, POWDER, FOR SOLUTION INTRAVENOUS at 09:08

## 2022-07-26 RX ADMIN — CEFEPIME HYDROCHLORIDE 2 G: 2 INJECTION, POWDER, FOR SOLUTION INTRAVENOUS at 15:12

## 2022-07-26 RX ADMIN — POTASSIUM CHLORIDE 20 MEQ: 1.5 POWDER, FOR SOLUTION ORAL at 11:41

## 2022-07-26 RX ADMIN — SODIUM CHLORIDE, PRESERVATIVE FREE 50 ML: 5 INJECTION INTRAVENOUS at 15:15

## 2022-07-26 RX ADMIN — IPRATROPIUM BROMIDE AND ALBUTEROL 2 PUFF: 20; 100 SPRAY, METERED RESPIRATORY (INHALATION) at 11:43

## 2022-07-26 RX ADMIN — TOPIRAMATE 50 MG: 25 TABLET, FILM COATED ORAL at 21:39

## 2022-07-26 RX ADMIN — METRONIDAZOLE 500 MG: 500 TABLET ORAL at 15:12

## 2022-07-26 RX ADMIN — OXCARBAZEPINE 450 MG: 150 TABLET, FILM COATED ORAL at 21:34

## 2022-07-26 RX ADMIN — BISACODYL 10 MG: 10 SUPPOSITORY RECTAL at 17:16

## 2022-07-26 RX ADMIN — REMDESIVIR 100 MG: 100 INJECTION, POWDER, LYOPHILIZED, FOR SOLUTION INTRAVENOUS at 20:36

## 2022-07-26 RX ADMIN — PANTOPRAZOLE SODIUM 40 MG: 20 TABLET, DELAYED RELEASE ORAL at 09:06

## 2022-07-26 RX ADMIN — VANCOMYCIN HYDROCHLORIDE 1000 MG: 1 INJECTION, SOLUTION INTRAVENOUS at 16:25

## 2022-07-26 RX ADMIN — LIDOCAINE 1 PATCH: 246 PATCH TOPICAL at 15:13

## 2022-07-26 RX ADMIN — CALCIUM GLUCONATE 1 G: 20 INJECTION, SOLUTION INTRAVENOUS at 11:44

## 2022-07-26 RX ADMIN — LEVETIRACETAM 1000 MG: 500 TABLET, FILM COATED ORAL at 16:20

## 2022-07-26 ASSESSMENT — ACTIVITIES OF DAILY LIVING (ADL)
ADLS_ACUITY_SCORE: 29

## 2022-07-26 NOTE — PLAN OF CARE
Patient arouses with voice but she is sleepy and weak. Patient is total cares. Encourage oral intake. Vital signs are wnl. Patient reports headache-received tylenol; MD aware.  Potassium replaced per protocol. Antibiotics treatment is ongoing. Monitor.   Problem: Plan of Care - These are the overarching goals to be used throughout the patient stay.    Goal: Optimal Comfort and Wellbeing  Problem: Fluid Imbalance (Pneumonia)  Goal: Fluid Balance  Outcome: Ongoing, Progressing   Problem: Infection (Pneumonia)  Goal: Resolution of Infection Signs and Symptoms  Intervention: Prevent Infection Progression

## 2022-07-26 NOTE — CONSULTS
[facility] ACUTE PAIN SERVICE CONSULTATION     Date of Admission:  7/19/2022  Date of Consult (When I saw the patient): 07/26/22  Physician requesting consult: Dr Ed Gagnon  Reason for consult: Encephalopathy, polypharmacy for trigeminal neuralgia, question taper T3, nortriptyline     Assessment/Plan:     Julisa Chaney is a 77 year old female who was admitted on 7/19/2022.  Pain team was asked to see the patient for Encephalopathy, polypharmacy for trigeminal neuralgia, question taper T3, nortriptyline. Admitted for diarrhea, fevers, generalized weakness, lightheadedness, fall, confirmed COVID-19, viral pneumonia s/t to COVID-19, severe sepsis due to pneumonia.   Is being treated with antibiotics.  She continues to have altered mental status/encephalopathy throughout her hospital stay. History of trigeminal neuralgia, chronic pain, migraines, hyperlipidemia, hypercalcemia, iron deficiency anemia, depression, pyloric ulcer. Describes pain as 0/10. The patient does not smoke and denies chemical dependency history.     Patient on Topamax, Trileptal, Nortriptyline, Keppra.  The first 3 medications are for trigeminal neuralgia.  Keppra was added in February 2022 for asterixis per Neurology (in February 2022 she was admitted with fever, generalized weakness, she was found to to have pneumonia. She was noted to have weakness, tremors, asterixis). Also prescribed APAP #3 per PCP for  trigeminal neuralgia.     At time of visit past keeps her eyes closed. She nods yes or no to some questions. She does not answer orientation questions. History obtained through chart review. Discussed with RN as well.      Opioid Induced Respiratory Depression Risk Assessment:?high  (Low 0-1; Moderate 2-4; or High >4 or >/= 3 if two of the risk factors are age > 60 and opioid naive) due to the following risk factors: JENARO, COPD/Asthma/pulmonary disease, CHF, renal dysfunction, hepatic dysfunction, Obesity, Smoker, Age>60, >2 opioid  therapies, concomitant CNS depressants, opioid naive status, or post surgical ?     PLAN:   1) Pain is consistent with history of trigeminal neuralgia. Labs and imaging indicated: I have personally reviewed pertinent labs, tests, and radiologic imaging in patient's chart. Head CT shows new vasogenic edema in the left frontal lobe with possible small mass lesion. MRI brain with and without contrast to better clarify this lesion recommended per Neurology. EEG to evaluate for epilepsy per Neurology. Treatment plan includes multimodal pain approach, Hospital Medicine Service for medical management, Neurology consult to eval altered mental status. Will stop APAP# 3 and Nortriptyline on hold per primary service. Encephalopathy likely in the setting of COVID, sepsis, pneumonia, prolonged hospitalization. Medications could contribute but there are some other factors here. Also small lesion noted on CT. Appreciate Neurology consult.   2)Multimodal Medication Therapy  Topical: lidocaine patch daily   NSAID'S: none, COVID, sepsis, ORTIZ  Muscle Relaxants: none  Adjuvants: APAP 650 mg q6h prn, APAP #3 300-30 mg 1 tab q6h prn  Antidepressants/anxiolytics: Seroquel 12.5 mg q6h prn, lorazepam prior to MRI, Cogentin 1 mg tid prn, topamax 50 mg at bedtime, nortriptyline on hold  Opioids: APAP #3 - stop for now, has not used in two days.   IV Pain medication: none   3)Non-medication interventions: rest   4)Constipation Prophylaxis: Scheduled senna  5) Care Teams: [facility] [place], neurology     -Opioid prescriber has been Mara Murillo - PCP  -[place]  pulled from system on 7/26/22. This indicates ongoing fills for APAP#3  6/23/22 APAP#3 #120 for 30 days   Discharge Recommendations - We recommend prescribing the following at the time of discharge: TBD     History of Present Illness (HPI):       Julisa Chaney is a 77 year old old female who presented for diarrhea, fevers, generalized weakness, lightheadedness, fall.  Past medical history as above. The  pain is reported to be chronic, located in the face, neck. Current pain is rated at 0/10.    Per MN  review, the patient does have a slight opioid tolerance. Opioid induced side effects noted and include: sedation.       Reviewed medical record, labs, imaging, ED note, and care everywhere.     Medical History   PAST MEDICAL HISTORY:   Past Medical History:   Diagnosis Date     High cholesterol      Migraines      Sepsis (H) 8/10/2020       PAST SURGICAL HISTORY:   Past Surgical History:   Procedure Laterality Date     HYSTERECTOMY       PICC TRIPLE LUMEN PLACEMENT  2022          [facility] ESOPHAGOGASTRODUODENOSCOPY TRANSORAL DIAGNOSTIC N/A 2020    Procedure: ESOPHAGOGASTRODUODENOSCOPY (EGD);  Surgeon: Nathan Smalls MD;  Location: [facility] [place];  Service: Gastroenterology     [facility] ESOPHAGOGASTRODUODENOSCOPY TRANSORAL DIAGNOSTIC N/A 2020    Procedure: ESOPHAGOGASTRODUODENOSCOPY (EGD), PYLORIC DILATION;  Surgeon: Nathan Smalls MD;  Location: [facility] [place];  Service: Gastroenterology     [facility] COLONOSCOPY W/WO BRUSH/WASH N/A 2020    Procedure: COLONOSCOPY WITH POLYPECTOMY;  Surgeon: Nathan Smalls MD;  Location: [facility] [place];  Service: Gastroenterology       FAMILY HISTORY:   Family History   Problem Relation Age of Onset     Cancer Father      Testicular cancer Grandchild 17.00     Breast Cancer Mother      Breast Cancer Sister      Breast Cancer Maternal Aunt      Breast Cancer Sister        SOCIAL HISTORY:   Social History     Tobacco Use     Smoking status: Former Smoker     Packs/day: 1.00     Years: 50.00     Pack years: 50.00     Quit date: 2014     Years since quittin.6     Smokeless tobacco: Never Used   Substance Use Topics     Alcohol use: No        HEALTH & LIFESTYLE PRACTICES  Tobacco:  reports that she quit smoking about 7 years ago. She has a 50.00 pack-year smoking history. She has never used smokeless tobacco.  Alcohol:  reports no history of alcohol  use.  Illicit drugs:  reports no history of drug use.    Allergies  Allergies   Allergen Reactions     Contrast [Iohexol] Rash     Noticed during 08/2020 admission. Received IV contrast on 8/5     Chocolate Headache     Penicillins Rash       Problem List  Patient Active Problem List    Diagnosis Date Noted     Sepsis (H) 07/19/2022     Priority: Medium     Sepsis, due to unspecified organism, unspecified whether acute organ dysfunction present (H) 07/19/2022     Priority: Medium     Infection due to 2019 novel coronavirus 07/19/2022     Priority: Medium     ORTIZ (acute kidney injury) (H) 07/19/2022     Priority: Medium     Mild major depression (H) 05/04/2021     Priority: Medium     Pyloric ulcer, chronic 05/04/2021     Priority: Medium     Loosing wt, as appetite is not much       Formatting of this note might be different from the original.  Loosing wt, as appetite is not much       Abnormal chest CT 08/16/2020     Priority: Medium     CT 8/8/20-Tree-in-bud opacities involving the right upper and right middle   lobes of the lung are suspicious for an infectious/inflammatory process.   There is a more nodular appearing opacity in the right upper lobe on axial   image 138 of series 4 for which a   follow-up CT examination in three months is recommended.             Chronic pain syndrome      Priority: Medium     Iron deficiency anemia due to chronic blood loss 08/08/2020     Priority: Medium     Added automatically from request for surgery [id number]      Formatting of this note might be different from the original.  Added automatically from request for surgery [id number]       Hypercalcemia 02/06/2019     Priority: Medium     Controlled substance agreement signed 07/09/2018     Priority: Medium     Osteopenia      Priority: Medium     Created by Conversion  Replacement Utility updated for latest IMO load      Formatting of this note might be different from the original.  Created by Conversion    Replacement Utility  updated for latest IMO load       Migraine headache      Priority: Medium     Had MRI of head done feb 2022, for new left side weakness  Following neurologist   IMPRESSION:  1.  No evidence of acute large territorial infarction, acute intracranial hemorrhage, or mass lesion.  2.  Stable chronic bilateral cerebellar infarcts.  3.  Stable mild chronic small vessel ischemic change and mild diffuse brain parenchymal volume loss.       Counseling regarding advanced directives and goals of care 10/14/2014     Priority: Medium     Trigeminal neuralgia      Priority: Medium     Currently following neurologist table on trileptal , nortriptyline,kepra, topamax, and does take tylenol#3 daily       Hyperlipidemia      Priority: Medium     Created by Conversion      Formatting of this note might be different from the original.  Created by Conversion         Prior to Admission Medications   Medications Prior to Admission   Medication Sig Dispense Refill Last Dose     acetaminophen-codeine (TYLENOL #3) 300-30 MG tablet TAKE 1 TABLET BY MOUTH EVERY 6 HOURS AS NEEDED FOR PAIN 120 tablet 0 Past Week     ARTIFICIAL TEARS 1.4 % ophthalmic solution INSTILL 2 DROPS IN BOTH EYES AS NEEDED 15 mL 0      cholecalciferol, vitamin D3, 50 mcg (2,000 unit) Tab [CHOLECALCIFEROL, VITAMIN D3, 50 MCG (2,000 UNIT) TAB] Take 1 tablet (2,000 Units total) by mouth daily. One tab daily 90 tablet 4 Unknown     desonide (DESOWEN) 0.05 % cream [DESONIDE (DESOWEN) 0.05 % CREAM] Apply to affected area 2 times daily (Patient taking differently: Apply topically 2 times daily as needed) 60 g 1      ferrous sulfate 325 (65 FE) MG tablet [FERROUS SULFATE 325 (65 FE) MG TABLET] Take 1 tablet (325 mg total) by mouth daily with breakfast. 30 tablet 0 Unknown at Unknown time     levETIRAcetam (KEPPRA) 500 MG tablet Take 1 tablet (500 mg) by mouth 2 times daily 180 tablet 0 Unknown     multivitamin (DAILY-DAVID) per tablet [MULTIVITAMIN (DAILY-DAVID) PER TABLET] Take 1  "tablet by mouth daily. 100 tablet 3 Unknown     nortriptyline (PAMELOR) 25 MG capsule Take 50 mg by mouth 2 times daily   Past Week     omeprazole (PRILOSEC) 40 MG DR capsule Take 1 capsule (40 mg) by mouth daily 90 capsule 3 Past Week     OXcarbazepine (TRILEPTAL) 150 MG tablet TAKE 3 TABLETS(450 MG) BY MOUTH AT BEDTIME 270 tablet 2 Past Week at Unknown time     senna-docusate (SENOKOT-S/PERICOLACE) 8.6-50 MG tablet Take 1 tablet by mouth At Bedtime   Unknown     topiramate (TOPAMAX) 50 MG tablet Take 1 tablet (50 mg) by mouth At Bedtime 90 tablet 4 Past Week     mirtazapine (REMERON) 15 MG tablet Take 1 tablet (15 mg) by mouth At Bedtime 30 tablet 1      nortriptyline (PAMELOR) 50 MG capsule Take 1 capsule (50 mg) by mouth 2 times daily (Patient not taking: Reported on 7/20/2022) 180 capsule 1 Not Taking at Unknown time       Review of Systems  Complete ROS reviewed, unless noted in HPI, all other systems reviewed (with patient) and all others found to be negative.      Objective:     Physical Exam:  BP (!) 164/72 (BP Location: Left arm)   Pulse 96   Temp 97.6  F (36.4  C) (Axillary)   Resp 20   Ht 1.651 m (5' 5\")   Wt 54.6 kg (120 lb 5.9 oz)   SpO2 96%   BMI 20.03 kg/m    Weight:   Vitals:    07/19/22 1823 07/23/22 1807   Weight: 54.4 kg (120 lb) 54.6 kg (120 lb 5.9 oz)      Body mass index is 20.03 kg/m .    General Appearance:  RASS -3   Head:  Normocephalic, without obvious abnormality   Eyes:  Eyes closed    ENT/Throat: Lips, mucosa, and tongue normal; teeth and gums normal   Lymph/Neck: Supple, symmetrical, trachea midline   Lungs:   Diminished to auscultation bilaterally, respirations unlabored   Cardiovascular/Heart:  Regular rate and rhythm, S1, S2 normal\   Abdomen:   Soft, non-tender, bowel sounds active all four quadrants, narayan    Musculoskeletal: Extremities normal, atraumatic   Skin: Skin pale    Neurologic: RASS -3       Imaging: Reviewed I have personally reviewed pertinent labs, tests, and " radiologic imaging in patient's chart.  Chest XR,  PA & LAT    Result Date: 2022  EXAM: XR CHEST 2 VW LOCATION: [facility] DATE/TIME: 2022 8:30 PM INDICATION: fever COMPARISON: 2022     IMPRESSION: Lungs are clear without consolidation or effusion. Cardiac and mediastinal silhouettes and osseous structures appear unchanged.    Echocardiogram Complete    Result Date: 2022  [id number]^ERIC^ROSALIND^[place] [address]  Name: CHELITA SAWANT MRN: [id number] : 1945 Study Date: 2022 03:23 PM Age: 77 yrs Gender: Female Patient Location: [facility] Reason For Study: Dyspnea Ordering Physician: ROSALIND REYES Performed By: MARYANN  BSA: 1.6 m2 Height: 65 in Weight: 129 lb HR: 99 BP: 142/68 mmHg ______________________________________________________________________________ Procedure Complete Portable Echo Adult. Definity (NDC #70470-405) given intravenously. ______________________________________________________________________________ Interpretation Summary  There is mild concentric left ventricular hypertrophy. The visual ejection fraction is 65-70%. No regional wall motion abnormalities noted. The aortic valve is trileaflet with aortic valve sclerosis. There is mild (1+) aortic regurgitation.  Compared to the prior study dated 2022, there are changes as noted. Pulmonary HTN is not identified on current study. ______________________________________________________________________________ Left Ventricle The left ventricle is normal in size. There is mild concentric left ventricular hypertrophy. The visual ejection fraction is 65-70%. No regional wall motion abnormalities noted.  Right Ventricle The right ventricle is normal size. Right ventricular function cannot be assessed due to poor image quality.  Atria The left atrium is not well visualized. Right atrium not well visualized.   Mitral Valve Mitral valve leaflets appear normal. There is no evidence of mitral stenosis or clinically significant mitral regurgitation.  Tricuspid Valve The tricuspid valve is not well visualized, but is grossly normal. There is trace tricuspid regurgitation. Right ventricle systolic pressure estimate normal.  Aortic Valve The aortic valve is trileaflet with aortic valve sclerosis. There is mild (1+) aortic regurgitation. No aortic stenosis is present.  Pulmonic Valve The pulmonic valve is not well visualized. There is no pulmonic valvular regurgitation.  Vessels The aorta root is normal. Normal size ascending aorta.  Pericardium There is no pericardial effusion.  ______________________________________________________________________________ MMode/2D Measurements & Calculations RVDd: 3.0 cm IVSd: 1.1 cm LVIDd: 3.0 cm LVIDs: 1.6 cm LVPWd: 1.1 cm FS: 45.4 %  LV mass(C)d: 96.3 grams LV mass(C)dI: 58.7 grams/m2 Ao root diam: 3.3 cm LA dimension: 2.9 cm asc Aorta Diam: 3.0 cm LA/Ao: 0.89 LVOT diam: 2.3 cm LVOT area: 4.2 cm2 LA Volume Indexed (AL/bp): 23.9 ml/m2 RWT: 0.73  Time Measurements MM HR: 98.0 BPM  Doppler Measurements & Calculations MV E max mikal: 74.7 cm/sec MV A max mikal: 112.0 cm/sec MV E/A: 0.67 MV dec time: 0.18 sec AI P1/2t: 483.6 msec LV V1 max P.9 mmHg LV V1 max: 157.3 cm/sec LV V1 VTI: 23.6 cm SV(LVOT): 99.4 ml SI(LVOT): 60.6 ml/m2 PA acc time: 0.14 sec TR max mikal: 259.4 cm/sec TR max P.9 mmHg E/E' av.7 Lateral E/e': 9.1 Medial E/e': 10.4  ______________________________________________________________________________ Report approved by: Denny Carolina 2022 04:43 PM       XR Chest Port 1 View    Result Date: 2022  EXAM: XR CHEST PORT 1 VIEW LOCATION: [facility] DATE/TIME: 2022 2:19 PM INDICATION: followup pneumonia COMPARISON: 2022     IMPRESSION: A right-sided PICC line is identified with the tip at the SVC/right atrial junction. The heart  is normal in size. No pleural effusion or pneumothorax is identified. No pneumothorax is identified.    CT Abdomen Pelvis w/o Contrast    Result Date: 7/19/2022  EXAM: CT ABDOMEN PELVIS W/O CONTRAST LOCATION: [facility] DATE/TIME: 7/19/2022 8:24 PM INDICATION: Left lower quadrant pain. Fever. Diarrhea. COMPARISON: CT of the abdomen and pelvis performed 08/08/2020. TECHNIQUE: CT scan of the abdomen and pelvis was performed without IV contrast. Multiplanar reformats were obtained. Dose reduction techniques were used. CONTRAST: None. FINDINGS: LOWER CHEST: Patchy consolidation and infiltrate at both lung bases posteriorly, greater on the left, is new since the previous exam. Coronary artery calcification. HEPATOBILIARY: Normal. PANCREAS: Normal. SPLEEN: Normal. ADRENAL GLANDS: Normal. KIDNEYS/BLADDER: No significant mass, stone, or hydronephrosis. BOWEL: No obstruction or inflammatory change. LYMPH NODES: No lymphadenopathy. VASCULATURE: Moderate atherosclerotic aortoiliac calcification. PELVIC ORGANS: Hysterectomy. MUSCULOSKELETAL: Degenerative changes in the lower lumbar spine.     IMPRESSION: 1.  Patchy consolidation and infiltrate at both lung bases are suspicious for pneumonia. 2.  No acute abnormality in the abdomen or pelvis.     CT Head w/o Contrast    Result Date: 7/25/2022  EXAM: CT HEAD W/O CONTRAST LOCATION: [facility] DATE/TIME: 7/25/2022 5:33 PM INDICATION: enceophalopathy COMPARISON: 02/08/2022. TECHNIQUE: Routine CT Head without IV contrast. Multiplanar reformats. Dose reduction techniques were used. FINDINGS: INTRACRANIAL CONTENTS: No intracranial hemorrhage, extraaxial collection, or mass effect.  No CT evidence of acute infarct. There is scattered low-attenuation within the periventricular and subcortical white matter consistent with diffuse small vessel ischemic disease. There are focal areas of encephalomalacia again noted involving both cerebellar  hemispheres. Of note there is new edema  involving the white matter of the anterior left frontal lobe especially inferiorly. This does not appear to be involving the cortex. There is a possible slightly high density mass in the anterior left frontal lobe that measures approximately 1.0 cm anteriorly posteriorly by 1.9 cm transversely by 0.8 cm craniocaudally as seen on axial image #23 and sagittal image  #40. The ventricular system, basal cisterns and the cortical sulci are consistent with mild diffuse volume loss. VISUALIZED ORBITS/SINUSES/MASTOIDS: No intraorbital abnormality. No paranasal sinus mucosal disease. No middle ear or mastoid effusion. BONES/SOFT TISSUES: No acute abnormality.     IMPRESSION: 1.  New vasogenic edema anterior left frontal lobe with possible small mass lesion. 2.  Recommend MRI brain without and with contrast. 3.  Diffuse age related changes along with encephalomalacia cerebellar hemispheres bilaterally. 4.  No evidence of a midline shift, hemorrhage or focal area suggestive of infarct by CT. These findings were communicated by phone to Dr. Gagnon at 5:55 PM on 07/25/2025.    XR Video Swallow with SLP or OT    Result Date: 7/20/2022  EXAM: XR VIDEO SWALLOW WITH SLP OR OT LOCATION: [facility] DATE/TIME: 7/20/2022 11:02 AM INDICATION: Difficulty swallowing. COMPARISON: 2/7/2022. TECHNIQUE: Routine swallow study with speech pathology using multiple barium thicknesses. FINDINGS: FLUOROSCOPIC TIME: 1.3 minutes. NUMBER OF IMAGES: 0 Swallow study with Speech Pathology using multiple barium thicknesses. Again seen is a trace amount of aspiration with thin barium consistency. No other evidence of laryngeal penetration or aspiration with mildly thickened liquid barium. Please see dedicated Speech Pathology report for further details of examination.       Labs: Reviewed I have personally reviewed pertinent labs, tests, and radiologic imaging in patient's chart.  Recent  Results (from the past 24 hour(s))   Vancomycin level    Collection Time: 07/25/22  1:58 PM   Result Value Ref Range    Vancomycin 14.8   mg/L   Potassium    Collection Time: 07/25/22  5:16 PM   Result Value Ref Range    Potassium 4.6 3.5 - 5.0 mmol/L   Ammonia    Collection Time: 07/25/22  5:54 PM   Result Value Ref Range    Ammonia 30 11 - 35 umol/L       Total time spent 65 minutes with greater than 50% in consultation, education and coordination of care.     Thank you for this consultation.    JERONIMO TaylorP-C  Acute Care Pain Management Program  [facility] ([place], [place], [place])  Monday-Friday 8a-4p   Page via online paging system or call [phone]

## 2022-07-26 NOTE — PLAN OF CARE
Problem: Risk for Delirium  Goal: Optimal Coping  Outcome: Ongoing, Progressing   Goal Outcome Evaluation:    Alert to self. Infrequent non productive cough. Abrams catheter in place. Turned and repositioned. No complain of pain. On potassium protocol, recheck this morning. Patient has been sleepy.Very minimal oral intake.

## 2022-07-26 NOTE — PROGRESS NOTES
[facility]  Infectious Disease  Progress Note     Date of Admission:  7/19/2022    Assessment & Plan   COVID19 pneumonia, vaccinated, syx 7/15  Bacterial pneumonia as PCT is high at 2  Trace amount of aspiration with thin barium consistency  MRSA screen positive  PCN allergy, rash  encephalopathy seems improved, but still looks weak, little appetite  Leukocytosis down, CRP down, not on oxygen sat 98%! This is sec to control of bacterial infection. So fever likely from covid.  CT with brain mass  BC neg       PLAN    On vanco meropenem>>switch to cefepime metro with MS issues  Treat x 8 days-till 7/27, this is supported by normalization of the PCT  RDV got 3, complete 5 days  Not on dexa since not hypoxic  Trend fevers and inflammatory markers    Faiza Chandler M.D.    ______________________________________________________________________    Interval History   Headache, see CT  Alert  Looks weak    All 12 systems reviewed, negative except for the above.  Reliability?    Physical Exam   Vital Signs: Temp: 98.6  F (37  C) Temp src: Oral BP: 128/59 Pulse: 102   Resp: 22 SpO2: 97 % O2 Device: None (Room air)    Weight: 120 lbs 5.94 oz  Gen. appearance nontoxic  Eyes no conjunctivitis or icterus  Neck no stiffness or neck vein distention, no LN  Heart  No edema  Lungs breathing comfortably  Abdomen soft not tender  Extremities no synovitis, trace edema  Skin  no rash or emboli  Neurologic alert oriented no focal deficits oriented to two no tremors    Data   Results for orders placed or performed during the hospital encounter of 07/19/22 (from the past 24 hour(s))   Vancomycin level   Result Value Ref Range    Vancomycin 14.8   mg/L   Procalcitonin   Result Value Ref Range    Procalcitonin 0.21 (H) <0.05 ng/mL   XR Chest Port 1 View    Narrative    EXAM: XR CHEST PORT 1 VIEW  LOCATION: [facility]  DATE/TIME: 7/25/2022 2:19 PM    INDICATION: followup  pneumonia  COMPARISON: 07/19/2022      Impression    IMPRESSION: A right-sided PICC line is identified with the tip at the SVC/right atrial junction. The heart is normal in size. No pleural effusion or pneumothorax is identified. No pneumothorax is identified.   Potassium   Result Value Ref Range    Potassium 4.6 3.5 - 5.0 mmol/L   CT Head w/o Contrast    Narrative    EXAM: CT HEAD W/O CONTRAST  LOCATION: [facility]  DATE/TIME: 7/25/2022 5:33 PM    INDICATION: enceophalopathy  COMPARISON: 02/08/2022.  TECHNIQUE: Routine CT Head without IV contrast. Multiplanar reformats. Dose reduction techniques were used.    FINDINGS:  INTRACRANIAL CONTENTS: No intracranial hemorrhage, extraaxial collection, or mass effect.  No CT evidence of acute infarct. There is scattered low-attenuation within the periventricular and subcortical white matter consistent with diffuse small vessel   ischemic disease. There are focal areas of encephalomalacia again noted involving both cerebellar hemispheres. Of note there is new edema  involving the white matter of the anterior left frontal lobe especially inferiorly. This does not appear to be   involving the cortex. There is a possible slightly high density mass in the anterior left frontal lobe that measures approximately 1.0 cm anteriorly posteriorly by 1.9 cm transversely by 0.8 cm craniocaudally as seen on axial image #23 and sagittal image   #40. The ventricular system, basal cisterns and the cortical sulci are consistent with mild diffuse volume loss.     VISUALIZED ORBITS/SINUSES/MASTOIDS: No intraorbital abnormality. No paranasal sinus mucosal disease. No middle ear or mastoid effusion.    BONES/SOFT TISSUES: No acute abnormality.      Impression    IMPRESSION:  1.  New vasogenic edema anterior left frontal lobe with possible small mass lesion.     2.  Recommend MRI brain without and with contrast.    3.  Diffuse age related changes along with encephalomalacia  cerebellar hemispheres bilaterally.    4.  No evidence of a midline shift, hemorrhage or focal area suggestive of infarct by CT.     These findings were communicated by phone to Dr. Gagnon at 5:55 PM on 07/25/2025.   Ammonia   Result Value Ref Range    Ammonia 30 11 - 35 umol/L   CBC with platelets   Result Value Ref Range    WBC Count 13.6 (H) 4.0 - 11.0 10e3/uL    RBC Count 4.63 3.80 - 5.20 10e6/uL    Hemoglobin 9.5 (L) 11.7 - 15.7 g/dL    Hematocrit 32.6 (L) 35.0 - 47.0 %    MCV 70 (L) 78 - 100 fL    MCH 20.5 (L) 26.5 - 33.0 pg    MCHC 29.1 (L) 31.5 - 36.5 g/dL    RDW 18.0 (H) 10.0 - 15.0 %    Platelet Count 433 150 - 450 10e3/uL   CRP inflammation   Result Value Ref Range    CRP 4.6 (H) 0.0 - <0.8 mg/dL   D dimer quantitative   Result Value Ref Range    D-Dimer Quantitative 0.82 (H) 0.00 - 0.50 ug/mL FEU    Narrative    This D-dimer assay is intended for use in conjunction with a clinical pretest probability assessment model to exclude pulmonary embolism (PE) and deep venous thrombosis (DVT) in outpatients suspected of PE or DVT. The cut-off value is 0.50 ug/mL FEU.   Ionized Calcium   Result Value Ref Range    Calcium, Ionized pH 7.4 1.07 (L) 1.11 - 1.30 mmol/L    pH 7.44 7.35 - 7.45    Calcium, Ionized Measured 1.04 (L) 1.11 - 1.30 mmol/L   Basic metabolic panel   Result Value Ref Range    Sodium 129 (L) 136 - 145 mmol/L    Potassium 3.7 3.5 - 5.0 mmol/L    Chloride 96 (L) 98 - 107 mmol/L    Carbon Dioxide (CO2) 24 22 - 31 mmol/L    Anion Gap 9 5 - 18 mmol/L    Urea Nitrogen 10 8 - 28 mg/dL    Creatinine 0.51 (L) 0.60 - 1.10 mg/dL    Calcium 8.1 (L) 8.5 - 10.5 mg/dL    Glucose 103 70 - 125 mg/dL    GFR Estimate >90 >60 mL/min/1.73m2   Magnesium   Result Value Ref Range    Magnesium 2.3 1.8 - 2.6 mg/dL   Keppra (Levetiracetam) Level   Result Value Ref Range    Keppra (Levetiracetam) Level 6.0 (L) 10.0 - 40.0  g/mL   Sodium random urine   Result Value Ref Range    Sodium Urine mmol/L 124 mmol/L

## 2022-07-26 NOTE — PROGRESS NOTES
Brief Renal Note:  Consult received and chart reviewed. Multiple medical conditions at this time including COVID and bacterial superinfection as well as cerebral edema from left frontal brain lesion and onset of delirium.    Urine sodium is quite high. Intake noted to be low. BP soft, and mildly tachycardic. Adding on urine osmolarity from this morning. 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 Possibility of cerebal salt wasting also present. Serum sodium only 129, not dramatically low. No interventions until all labs back. BMP in AM.     Full consult in AM.     Please call with questions or changes in status.    Wallace Azul MD  [facility]  [phone]

## 2022-07-26 NOTE — PROGRESS NOTES
NEUROLOGY PROGRESS NOTE     Julisa Chaney,  1945, MRN [id number] Date: 2022     [facility]   Code status:  Full Code   PCP: Mara Murillo, [phone]      ASSESSMENT & PLAN   Diagnosis code: Encephalopathy    Encephalopathy  Recent COVID-19 viral pneumonia  Recent severe sepsis and acute kidney injury  History of trigeminal neuralgia  Brain mass/abnormal head CT      Head CT shows new vasogenic edema in the left frontal lobe with possible small mass lesion    MRI brain with and without contrast to better clarify this lesion.  Pending.  Patient refusing MRI.    EEG negative for ongoing seizures though does suggest decreased seizure threshold    Keppra level subtherapeutic.  Increase Keppra dose 1000 mg twice daily with load.    Trileptal, Topamax, nortriptyline could be held or continued depending on patient's pain control-would defer to primary team    UA negative on admission.  B12/TSH noncontributory    Ammonia normal.    PT/OT if needed    Patient's ongoing encephalopathy is most likely related to metabolic toxic disturbance/infectious disturbance.  This is less likely related to the brain mass.    Would consider lumbar puncture depending on the results of the MRI.    Sundowning precautions/supportive care    Will follow back once the MRI is done.  Nothing else to add for right now.  Please call if there are any changes on her exam.       Chief Complaint   Patient presents with     Nausea, Vomiting, & Diarrhea        HISTORY OF PRESENT ILLNESS     We have been requested by Dr. Gagnon to evaluate Julisa Chaney who is a 77 year old  female for altered mental status.  Is a 77-year-old female admitted about a week ago for evaluation of diarrhea fevers generalized weakness lightheadedness and fall.  She was diagnosed to have community-acquired pneumonia secondary to COVID-19 infection.  She was also diagnosed with severe sepsis.  Is being treated with antibiotics.  She continues to have  altered mental status/encephalopathy throughout her hospital stay.  She has not significantly improved until today for which neurology was consulted.  She also has acute kidney injury which resolved with intraventricular fluid.  She did have poor oral intake prior to admission.  In February 2022 when she was admitted she did have some asterixis but no clear cause could be identified.    Patient underwent a head CT today which showed a left frontal lesion with vasogenic edema. MRI was recommended though the patient is refusing the MRI possibly because of claustrophobia.  Would consider steroids for the vasogenic edema depending on MRI results.    Patient is also on Topamax, Trileptal, nortriptyline, Keppra.  The first 3 medications are for her trigeminal neuralgia.  Keppra was added in February for asterixis.  Levels have been subtherapeutic.  No seizure-like activity noted.    7/26  Patient unable to do MRI.  Potentially will need anesthesia involved.  EEG completed today.  No seizure-like activity noted.  Patient's encephalopathy slightly better.     PAST MEDICAL & SURGICAL HISTORY     Medical History  Past Medical History:   Diagnosis Date     High cholesterol      Migraines      Sepsis (H) 8/10/2020     Surgical History  Past Surgical History:   Procedure Laterality Date     HYSTERECTOMY       PICC TRIPLE LUMEN PLACEMENT  7/22/2022          [facility] ESOPHAGOGASTRODUODENOSCOPY TRANSORAL DIAGNOSTIC N/A 8/13/2020    Procedure: ESOPHAGOGASTRODUODENOSCOPY (EGD);  Surgeon: Nathan Smalls MD;  Location: [facility];  Service: Gastroenterology     [facility] ESOPHAGOGASTRODUODENOSCOPY TRANSORAL DIAGNOSTIC N/A 8/14/2020    Procedure: ESOPHAGOGASTRODUODENOSCOPY (EGD), PYLORIC DILATION;  Surgeon: Nathan Smalls MD;  Location: [facility] [place];  Service: Gastroenterology     [facility] COLONOSCOPY W/WO BRUSH/WASH N/A 8/13/2020    Procedure: COLONOSCOPY WITH POLYPECTOMY;  Surgeon: Nathan Smalls MD;  Location: [facility];   Service: Gastroenterology        SOCIAL HISTORY     Social History     Tobacco Use     Smoking status: Former Smoker     Packs/day: 1.00     Years: 50.00     Pack years: 50.00     Quit date: 2014     Years since quittin.6     Smokeless tobacco: Never Used   Substance Use Topics     Alcohol use: No     Drug use: No        FAMILY HISTORY     Reviewed, and family history includes Breast Cancer in her maternal aunt, mother, sister, and sister; Cancer in her father; Testicular cancer (age of onset: 17.00) in her grandchild.     ALLERGIES     Allergies   Allergen Reactions     Contrast [Iohexol] Rash     Noticed during 2020 admission. Received IV contrast on      Chocolate Headache     Penicillins Rash        REVIEW OF SYSTEMS     12 system ROS was done other than the HPI this was negative.  Pertinent positives noted in the HPI.     HOME & HOSPITAL MEDICATIONS     Prior to Admission Medications  Medications Prior to Admission   Medication Sig Dispense Refill Last Dose     acetaminophen-codeine (TYLENOL #3) 300-30 MG tablet TAKE 1 TABLET BY MOUTH EVERY 6 HOURS AS NEEDED FOR PAIN 120 tablet 0 Past Week     ARTIFICIAL TEARS 1.4 % ophthalmic solution INSTILL 2 DROPS IN BOTH EYES AS NEEDED 15 mL 0      cholecalciferol, vitamin D3, 50 mcg (2,000 unit) Tab [CHOLECALCIFEROL, VITAMIN D3, 50 MCG (2,000 UNIT) TAB] Take 1 tablet (2,000 Units total) by mouth daily. One tab daily 90 tablet 4 Unknown     desonide (DESOWEN) 0.05 % cream [DESONIDE (DESOWEN) 0.05 % CREAM] Apply to affected area 2 times daily (Patient taking differently: Apply topically 2 times daily as needed) 60 g 1      ferrous sulfate 325 (65 FE) MG tablet [FERROUS SULFATE 325 (65 FE) MG TABLET] Take 1 tablet (325 mg total) by mouth daily with breakfast. 30 tablet 0 Unknown at Unknown time     levETIRAcetam (KEPPRA) 500 MG tablet Take 1 tablet (500 mg) by mouth 2 times daily 180 tablet 0 Unknown     multivitamin (DAILY-DAVID) per tablet [MULTIVITAMIN  "(DAILY-DAVID) PER TABLET] Take 1 tablet by mouth daily. 100 tablet 3 Unknown     nortriptyline (PAMELOR) 25 MG capsule Take 50 mg by mouth 2 times daily   Past Week     omeprazole (PRILOSEC) 40 MG DR capsule Take 1 capsule (40 mg) by mouth daily 90 capsule 3 Past Week     OXcarbazepine (TRILEPTAL) 150 MG tablet TAKE 3 TABLETS(450 MG) BY MOUTH AT BEDTIME 270 tablet 2 Past Week at Unknown time     senna-docusate (SENOKOT-S/PERICOLACE) 8.6-50 MG tablet Take 1 tablet by mouth At Bedtime   Unknown     topiramate (TOPAMAX) 50 MG tablet Take 1 tablet (50 mg) by mouth At Bedtime 90 tablet 4 Past Week     mirtazapine (REMERON) 15 MG tablet Take 1 tablet (15 mg) by mouth At Bedtime 30 tablet 1      nortriptyline (PAMELOR) 50 MG capsule Take 1 capsule (50 mg) by mouth 2 times daily (Patient not taking: Reported on 7/20/2022) 180 capsule 1 Not Taking at Unknown time       Hospital Medications    remdesivir  100 mg Intravenous Q24H    And     sodium chloride 0.9%  50 mL Intravenous Q24H     ceFEPIme (MAXIPIME) IV  2 g Intravenous Q12H     enoxaparin ANTICOAGULANT  40 mg Subcutaneous Q24H     ipratropium-albuterol  2 puff Inhalation 4x Daily     levETIRAcetam  1,000 mg Oral BID     lidocaine  1 patch Transdermal Q24H    And     lidocaine   Transdermal Q8H MARCIA     metroNIDAZOLE  500 mg Oral BID     [Held by provider] nortriptyline  50 mg Oral BID     OXcarbazepine  450 mg Oral At Bedtime     pantoprazole  40 mg Oral BID AC     sennosides  8.6 mg Oral BID     sodium chloride (PF)  3 mL Intracatheter Q8H     topiramate  50 mg Oral At Bedtime     vancomycin  1,000 mg Intravenous Q12H        PHYSICAL EXAM     Vital signs  Temp:  [97.5  F (36.4  C)-98.9  F (37.2  C)] 97.5  F (36.4  C)  Pulse:  [] 101  Resp:  [20-24] 20  BP: (117-164)/(59-74) 117/74  SpO2:  [92 %-98 %] 98 %    PHYSICAL EXAMINATION  VITALS: /74 (BP Location: Left arm)   Pulse 101   Temp 97.5  F (36.4  C) (Oral)   Resp 20   Ht 1.651 m (5' 5\")   Wt 52.3 kg " (115 lb 3.2 oz)   SpO2 98%   BMI 19.17 kg/m    GENERAL -Health appearing, No apparent distress  EYES- No scleral icterus, no eyelid droop, Pupils - see Neuro section  HEENT - Normocephalic, atraumatic, Hearing grossly intact; Oral mucosa moist and pink in color. External Ears and nose intact.   Neck - supple with no obvious lymphadenopathy or thyromegaly  PULM - Good spontaneous respiratory effort; Normal palpation of chest.   CV- Pedal pulses present with no peripheral edema/ No significant varicosities.  MSK- Gait - see Neuro section; Strength and tone- see Neuro section; Range of motion grossly intact.  PSYCH -cooperative    Neurological  Mental status - Patient is awake and partially oriented to self, place and time. Attention span is slightly decreased. Language is fluent though limited to few phrases.  Follows some very simple commands.  Cranial nerves - CN II-XII intact. Pupils are reactive and symmetric; EOMI, VFIT, NLF symmetric  Motor - There is no pronator drift. Motor exam shows 5/5 strength grossly in all extremities.  Tone - Tone is symmetric bilaterally in upper and lower extremities.  Sensation - Sensory exam is grossly intact to light touch, pain.  Coordination - Finger to nose is without dysmetria.  Unable to follow commands for heel-to-shin.  Gait and station --unable to safely ambulate due to altered mental status.  Formal gait testing cannot be done due to safety concerns from ongoing medical issues.  Exam slightly better compared to yesterday though no significant difference.     DIAGNOSTIC STUDIES     Pertinent Radiology   CT head  IMPRESSION:  1.  New vasogenic edema anterior left frontal lobe with possible small mass lesion.      2.  Recommend MRI brain without and with contrast.     3.  Diffuse age related changes along with encephalomalacia cerebellar hemispheres bilaterally.     4.  No evidence of a midline shift, hemorrhage or focal area suggestive of infarct by CT.      These findings  were communicated by phone to Dr. Gagnon at 5:55 PM on 07/25/2025.    EEG  IMPRESSION/REPORT/PLAN  This is a normal EEG during wakefulness and drowsiness except for mild generalized background dysrhythmia. EEG is not suggestive of ongoing seizures or encephalopathy. Further clinical correlation is needed.     Please note that the absence of epileptiform abnormalities does not exclude the possibility of epilepsy in any patient.     2/8/22  IMPRESSION:  1.  No evidence of acute large territorial infarction, acute intracranial hemorrhage, or mass lesion.  2.  Stable chronic bilateral cerebellar infarcts.  3.  Stable mild chronic small vessel ischemic change and mild diffuse brain parenchymal volume loss.    Component      Latest Ref Rng & Units 7/22/2022 7/24/2022 7/25/2022   Bilirubin Total      0.0 - 1.0 mg/dL 0.3     Bilirubin Direct      <=0.5 mg/dL 0.2     Protein Total      6.0 - 8.0 g/dL 6.5     Albumin      3.5 - 5.0 g/dL 2.4 (L)     Alkaline Phosphatase      45 - 120 U/L 86     AST      0 - 40 U/L 30     ALT      0 - 45 U/L 31     Creatinine      0.60 - 1.10 mg/dL  0.56 (L)    GFR Estimate      >60 mL/min/1.73m2  >90    Keppra (Levetiracetam) Level      10.0 - 40.0  g/mL 8.7 (L)     Topiramate Level      5.0 - 20.0 ug/mL 2.0 (L)     Oxcarb or Eslicarb Metabolite (MHD)      3 - 35 ug/mL 13     Magnesium      1.8 - 2.6 mg/dL   1.6 (L)   Vitamin B12      232 - 1,245 pg/mL   1,536 (H)   TSH      0.30 - 5.00 uIU/mL   0.56   CK Total      30 - 190 U/L   14 (L)   Ammonia      11 - 35 umol/L   30     Component      Latest Ref Rng & Units 7/26/2022   Keppra (Levetiracetam) Level      10.0 - 40.0  g/mL 6.0 (L)     EEG    IMPRESSION/REPORT/PLAN  This is an abnormal EEG during wakefulness and drowsiness due to generalized slowing and more severe left frontotemporal region slowing with intermittent sharp wave activity.  EEG is suggestive of left frontotemporal cerebral dysfunction and epilepsy.  EEG is also suggestive of  an ongoing encephalopathy.  Further clinical correlation is needed.     Please note that the absence of epileptiform abnormalities does not exclude the possibility of epilepsy in any patient.      Recent Results (from the past 24 hour(s))   CBC with platelets    Collection Time: 07/26/22  8:56 AM   Result Value Ref Range    WBC Count 13.6 (H) 4.0 - 11.0 10e3/uL    RBC Count 4.63 3.80 - 5.20 10e6/uL    Hemoglobin 9.5 (L) 11.7 - 15.7 g/dL    Hematocrit 32.6 (L) 35.0 - 47.0 %    MCV 70 (L) 78 - 100 fL    MCH 20.5 (L) 26.5 - 33.0 pg    MCHC 29.1 (L) 31.5 - 36.5 g/dL    RDW 18.0 (H) 10.0 - 15.0 %    Platelet Count 433 150 - 450 10e3/uL   CRP inflammation    Collection Time: 07/26/22  8:56 AM   Result Value Ref Range    CRP 4.6 (H) 0.0 - <0.8 mg/dL   D dimer quantitative    Collection Time: 07/26/22  8:56 AM   Result Value Ref Range    D-Dimer Quantitative 0.82 (H) 0.00 - 0.50 ug/mL FEU   Ionized Calcium    Collection Time: 07/26/22  8:56 AM   Result Value Ref Range    Calcium, Ionized pH 7.4 1.07 (L) 1.11 - 1.30 mmol/L    pH 7.44 7.35 - 7.45    Calcium, Ionized Measured 1.04 (L) 1.11 - 1.30 mmol/L   Basic metabolic panel    Collection Time: 07/26/22  8:56 AM   Result Value Ref Range    Sodium 129 (L) 136 - 145 mmol/L    Potassium 3.7 3.5 - 5.0 mmol/L    Chloride 96 (L) 98 - 107 mmol/L    Carbon Dioxide (CO2) 24 22 - 31 mmol/L    Anion Gap 9 5 - 18 mmol/L    Urea Nitrogen 10 8 - 28 mg/dL    Creatinine 0.51 (L) 0.60 - 1.10 mg/dL    Calcium 8.1 (L) 8.5 - 10.5 mg/dL    Glucose 103 70 - 125 mg/dL    GFR Estimate >90 >60 mL/min/1.73m2   Magnesium    Collection Time: 07/26/22  8:56 AM   Result Value Ref Range    Magnesium 2.3 1.8 - 2.6 mg/dL   Keppra (Levetiracetam) Level    Collection Time: 07/26/22  8:56 AM   Result Value Ref Range    Keppra (Levetiracetam) Level 6.0 (L) 10.0 - 40.0  g/mL   Sodium random urine    Collection Time: 07/26/22 11:49 AM   Result Value Ref Range    Sodium Urine mmol/L 124 mmol/L       Total time  spent for face to face visit, reviewing labs/imaging studies, counseling and coordination of care was: Over 35 min More than 50% of this time was spent on counseling and coordination of care.    Patient is COVID-positive which requires extra time.  Reviewing imaging studies.  Discussion of MRI with the hospitalist.  Getting EEG and reviewing results.  Adjustment of Keppra dosing.    Jamie Clarke MD  Neurologist  [facility]  Tel:- [phone]

## 2022-07-26 NOTE — PROGRESS NOTES
Bedside EEG was performed that included photic stimulation. The patient was both awake and drowsey during the recording.  EEG #   EEG system was used.[id number]  Skins-Intact

## 2022-07-26 NOTE — CONSULTS
NEUROLOGY CONSULTATION NOTE     Julisa Chaney,  1945, MRN [id number] Date: 2022     [facility]   Code status:  Full Code   PCP: Mara Murillo, [phone]      ASSESSMENT & PLAN   Diagnosis code: Encephalopathy    Encephalopathy  Recent COVID-19 viral pneumonia  Recent severe sepsis and acute kidney injury  History of trigeminal neuralgia  Brain mass      Head CT shows new vasogenic edema in the left frontal lobe with possible small mass lesion    MRI brain with and without contrast to better clarify this lesion    EEG to evaluate for epilepsy    Continue Keppra    Previous Keppra level was subtherapeutic; recheck Keppra level in the morning    Trileptal, Topamax, nortriptyline could be held or continued depending on patient's pain control-would defer to primary team    UA negative on admission.  B12/TSH noncontributory    Ammonia normal.    PT/OT if needed       Chief Complaint   Patient presents with     Nausea, Vomiting, & Diarrhea        HISTORY OF PRESENT ILLNESS     We have been requested by Dr. Gagnon to evaluate Julisa Chaney who is a 77 year old  female for altered mental status.  Is a 77-year-old female admitted about a week ago for evaluation of diarrhea fevers generalized weakness lightheadedness and fall.  She was diagnosed to have community-acquired pneumonia secondary to COVID-19 infection.  She was also diagnosed with severe sepsis.  Is being treated with antibiotics.  She continues to have altered mental status/encephalopathy throughout her hospital stay.  She has not significantly improved until today for which neurology was consulted.  She also has acute kidney injury which resolved with intraventricular fluid.  She did have poor oral intake prior to admission.  In 2022 when she was admitted she did have some asterixis but no clear cause could be identified.    Patient underwent a head CT today which showed a left frontal lesion with vasogenic edema. MRI was  recommended though the patient is refusing the MRI possibly because of claustrophobia.  Would consider steroids for the vasogenic edema depending on MRI results.    Patient is also on Topamax, Trileptal, nortriptyline, Keppra.  The first 3 medications are for her trigeminal neuralgia.  Keppra was added in February for asterixis.  Levels have been subtherapeutic.  No seizure-like activity noted.       PAST MEDICAL & SURGICAL HISTORY     Medical History  Past Medical History:   Diagnosis Date     High cholesterol      Migraines      Sepsis (H) 8/10/2020     Surgical History  Past Surgical History:   Procedure Laterality Date     HYSTERECTOMY       PICC TRIPLE LUMEN PLACEMENT  2022          [place] ESOPHAGOGASTRODUODENOSCOPY TRANSORAL DIAGNOSTIC N/A 2020    Procedure: ESOPHAGOGASTRODUODENOSCOPY (EGD);  Surgeon: Nathan Smalls MD;  Location: [facility];  Service: Gastroenterology     [place] ESOPHAGOGASTRODUODENOSCOPY TRANSORAL DIAGNOSTIC N/A 2020    Procedure: ESOPHAGOGASTRODUODENOSCOPY (EGD), PYLORIC DILATION;  Surgeon: Nathan Smalls MD;  Location: [facility];  Service: Gastroenterology     [facility] COLONOSCOPY W/WO BRUSH/WASH N/A 2020    Procedure: COLONOSCOPY WITH POLYPECTOMY;  Surgeon: Nathan Smalls MD;  Location: [facility];  Service: Gastroenterology        SOCIAL HISTORY     Social History     Tobacco Use     Smoking status: Former Smoker     Packs/day: 1.00     Years: 50.00     Pack years: 50.00     Quit date: 2014     Years since quittin.6     Smokeless tobacco: Never Used   Substance Use Topics     Alcohol use: No     Drug use: No        FAMILY HISTORY     Reviewed, and family history includes Breast Cancer in her maternal aunt, mother, sister, and sister; Cancer in her father; Testicular cancer (age of onset: 17.00) in her grandchild.     ALLERGIES     Allergies   Allergen Reactions     Contrast [Iohexol] Rash     Noticed during 2020 admission. Received IV  contrast on 8/5     Chocolate Headache     Penicillins Rash        REVIEW OF SYSTEMS     12 system ROS was done other than the HPI this was negative.  Pertinent positives noted in the HPI.     HOME & HOSPITAL MEDICATIONS     Prior to Admission Medications  Medications Prior to Admission   Medication Sig Dispense Refill Last Dose     acetaminophen-codeine (TYLENOL #3) 300-30 MG tablet TAKE 1 TABLET BY MOUTH EVERY 6 HOURS AS NEEDED FOR PAIN 120 tablet 0 Past Week     ARTIFICIAL TEARS 1.4 % ophthalmic solution INSTILL 2 DROPS IN BOTH EYES AS NEEDED 15 mL 0      cholecalciferol, vitamin D3, 50 mcg (2,000 unit) Tab [CHOLECALCIFEROL, VITAMIN D3, 50 MCG (2,000 UNIT) TAB] Take 1 tablet (2,000 Units total) by mouth daily. One tab daily 90 tablet 4 Unknown     desonide (DESOWEN) 0.05 % cream [DESONIDE (DESOWEN) 0.05 % CREAM] Apply to affected area 2 times daily (Patient taking differently: Apply topically 2 times daily as needed) 60 g 1      ferrous sulfate 325 (65 FE) MG tablet [FERROUS SULFATE 325 (65 FE) MG TABLET] Take 1 tablet (325 mg total) by mouth daily with breakfast. 30 tablet 0 Unknown at Unknown time     levETIRAcetam (KEPPRA) 500 MG tablet Take 1 tablet (500 mg) by mouth 2 times daily 180 tablet 0 Unknown     multivitamin (DAILY-DAVID) per tablet [MULTIVITAMIN (DAILY-DAVID) PER TABLET] Take 1 tablet by mouth daily. 100 tablet 3 Unknown     nortriptyline (PAMELOR) 25 MG capsule Take 50 mg by mouth 2 times daily   Past Week     omeprazole (PRILOSEC) 40 MG DR capsule Take 1 capsule (40 mg) by mouth daily 90 capsule 3 Past Week     OXcarbazepine (TRILEPTAL) 150 MG tablet TAKE 3 TABLETS(450 MG) BY MOUTH AT BEDTIME 270 tablet 2 Past Week at Unknown time     senna-docusate (SENOKOT-S/PERICOLACE) 8.6-50 MG tablet Take 1 tablet by mouth At Bedtime   Unknown     topiramate (TOPAMAX) 50 MG tablet Take 1 tablet (50 mg) by mouth At Bedtime 90 tablet 4 Past Week     mirtazapine (REMERON) 15 MG tablet Take 1 tablet (15 mg) by  "mouth At Bedtime 30 tablet 1      nortriptyline (PAMELOR) 50 MG capsule Take 1 capsule (50 mg) by mouth 2 times daily (Patient not taking: Reported on 7/20/2022) 180 capsule 1 Not Taking at Unknown time       Hospital Medications    remdesivir  100 mg Intravenous Q24H    And     [START ON 7/26/2022] sodium chloride 0.9%  50 mL Intravenous Q24H     enoxaparin ANTICOAGULANT  40 mg Subcutaneous Q24H     ipratropium-albuterol  2 puff Inhalation 4x Daily     levETIRAcetam  500 mg Oral BID     lidocaine  1 patch Transdermal Q24H    And     lidocaine   Transdermal Q8H MARCIA     meropenem  1 g Intravenous Q8H     [Held by provider] nortriptyline  50 mg Oral BID     OXcarbazepine  450 mg Oral At Bedtime     pantoprazole  40 mg Oral BID AC     sennosides  8.6 mg Oral BID     sodium chloride (PF)  3 mL Intracatheter Q8H     topiramate  50 mg Oral At Bedtime     vancomycin  1,000 mg Intravenous Q12H        PHYSICAL EXAM     Vital signs  Temp:  [97.6  F (36.4  C)-98.9  F (37.2  C)] 98.4  F (36.9  C)  Pulse:  [] 91  Resp:  [20-28] 24  BP: (109-142)/(59-76) 142/64  SpO2:  [94 %-97 %] 94 %    PHYSICAL EXAMINATION  VITALS: BP (!) 142/64 (BP Location: Left arm)   Pulse 91   Temp 98.4  F (36.9  C) (Oral)   Resp 24   Ht 1.651 m (5' 5\")   Wt 54.6 kg (120 lb 5.9 oz)   SpO2 94%   BMI 20.03 kg/m    GENERAL -Health appearing, No apparent distress  EYES- No scleral icterus, no eyelid droop, Pupils - see Neuro section  HEENT - Normocephalic, atraumatic, Hearing grossly intact; Oral mucosa moist and pink in color. External Ears and nose intact.   Neck - supple with no obvious lymphadenopathy or thyromegaly  PULM - Good spontaneous respiratory effort; Normal palpation of chest.   CV- Pedal pulses present with no peripheral edema/ No significant varicosities.  MSK- Gait - see Neuro section; Strength and tone- see Neuro section; Range of motion grossly intact.  PSYCH -cooperative    Neurological  Mental status - Patient is awake and " partially oriented to self, place and time. Attention span is slightly decreased. Language is fluent though limited to few phrases.  Follows some very simple commands.  Cranial nerves - CN II-XII intact. Pupils are reactive and symmetric; EOMI, VFIT, NLF symmetric  Motor - There is no pronator drift. Motor exam shows 5/5 strength grossly in all extremities.  Tone - Tone is symmetric bilaterally in upper and lower extremities.  Reflexes - Reflexes are diminished/absent.  Sensation - Sensory exam is grossly intact to light touch, pain.  Coordination - Finger to nose is without dysmetria.  Unable to follow commands for heel-to-shin.  Gait and station --unable to safely ambulate due to altered mental status.  Formal gait testing cannot be done due to safety concerns from ongoing medical issues.       DIAGNOSTIC STUDIES     Pertinent Radiology   CT head  IMPRESSION:  1.  New vasogenic edema anterior left frontal lobe with possible small mass lesion.      2.  Recommend MRI brain without and with contrast.     3.  Diffuse age related changes along with encephalomalacia cerebellar hemispheres bilaterally.     4.  No evidence of a midline shift, hemorrhage or focal area suggestive of infarct by CT.      These findings were communicated by phone to Dr. Gagnon at 5:55 PM on 07/25/2025.    EEG  IMPRESSION/REPORT/PLAN  This is a normal EEG during wakefulness and drowsiness except for mild generalized background dysrhythmia. EEG is not suggestive of ongoing seizures or encephalopathy. Further clinical correlation is needed.     Please note that the absence of epileptiform abnormalities does not exclude the possibility of epilepsy in any patient.     2/8/22  IMPRESSION:  1.  No evidence of acute large territorial infarction, acute intracranial hemorrhage, or mass lesion.  2.  Stable chronic bilateral cerebellar infarcts.  3.  Stable mild chronic small vessel ischemic change and mild diffuse brain parenchymal volume  loss.    Component      Latest Ref Rng & Units 7/22/2022 7/24/2022 7/25/2022   Bilirubin Total      0.0 - 1.0 mg/dL 0.3     Bilirubin Direct      <=0.5 mg/dL 0.2     Protein Total      6.0 - 8.0 g/dL 6.5     Albumin      3.5 - 5.0 g/dL 2.4 (L)     Alkaline Phosphatase      45 - 120 U/L 86     AST      0 - 40 U/L 30     ALT      0 - 45 U/L 31     Creatinine      0.60 - 1.10 mg/dL  0.56 (L)    GFR Estimate      >60 mL/min/1.73m2  >90    Keppra (Levetiracetam) Level      10.0 - 40.0  g/mL 8.7 (L)     Topiramate Level      5.0 - 20.0 ug/mL 2.0 (L)     Oxcarb or Eslicarb Metabolite (MHD)      3 - 35 ug/mL 13     Magnesium      1.8 - 2.6 mg/dL   1.6 (L)   Vitamin B12      232 - 1,245 pg/mL   1,536 (H)   TSH      0.30 - 5.00 uIU/mL   0.56   CK Total      30 - 190 U/L   14 (L)   Ammonia      11 - 35 umol/L   30       Recent Results (from the past 24 hour(s))   CBC with platelets    Collection Time: 07/25/22  6:19 AM   Result Value Ref Range    WBC Count 7.2 4.0 - 11.0 10e3/uL    RBC Count 3.93 3.80 - 5.20 10e6/uL    Hemoglobin 8.1 (L) 11.7 - 15.7 g/dL    Hematocrit 28.6 (L) 35.0 - 47.0 %    MCV 73 (L) 78 - 100 fL    MCH 20.6 (L) 26.5 - 33.0 pg    MCHC 28.3 (L) 31.5 - 36.5 g/dL    RDW 18.0 (H) 10.0 - 15.0 %    Platelet Count 386 150 - 450 10e3/uL   Basic metabolic panel    Collection Time: 07/25/22  6:19 AM   Result Value Ref Range    Sodium 132 (L) 136 - 145 mmol/L    Potassium 2.8 (LL) 3.5 - 5.0 mmol/L    Chloride 105 98 - 107 mmol/L    Carbon Dioxide (CO2) 22 22 - 31 mmol/L    Anion Gap 5 5 - 18 mmol/L    Urea Nitrogen 9 8 - 28 mg/dL    Creatinine 0.44 (L) 0.60 - 1.10 mg/dL    Calcium 6.7 (L) 8.5 - 10.5 mg/dL    Glucose 92 70 - 125 mg/dL    GFR Estimate >90 >60 mL/min/1.73m2   Magnesium    Collection Time: 07/25/22  6:19 AM   Result Value Ref Range    Magnesium 1.6 (L) 1.8 - 2.6 mg/dL   Vitamin B12    Collection Time: 07/25/22  6:19 AM   Result Value Ref Range    Vitamin B12 1,536 (H) 232 - 1,245 pg/mL   Folate     Collection Time: 07/25/22  6:19 AM   Result Value Ref Range    Folic Acid 19.5 4.6 - 34.8 ng/mL   TSH with free T4 reflex    Collection Time: 07/25/22  6:19 AM   Result Value Ref Range    TSH 0.56 0.30 - 5.00 uIU/mL   CRP inflammation    Collection Time: 07/25/22  6:19 AM   Result Value Ref Range    CRP 3.7 (H) 0.0 - <0.8 mg/dL   CK total    Collection Time: 07/25/22  6:19 AM   Result Value Ref Range    CK 14 (L) 30 - 190 U/L   Vancomycin level    Collection Time: 07/25/22  1:58 PM   Result Value Ref Range    Vancomycin 14.8   mg/L   Potassium    Collection Time: 07/25/22  5:16 PM   Result Value Ref Range    Potassium 4.6 3.5 - 5.0 mmol/L   Ammonia    Collection Time: 07/25/22  5:54 PM   Result Value Ref Range    Ammonia 30 11 - 35 umol/L       Total time spent for face to face visit, reviewing labs/imaging studies, counseling and coordination of care was: Over 70 min More than 50% of this time was spent on counseling and coordination of care.    Patient is COVID-positive and requires extra time.  Reviewing imaging studies.  Multiple conversations with the hospitalist.  Patient will continue.  Reviewing previous testing done in chart.    Jamie Clarke MD  Neurologist  [facility] Neurology [facility]  Tel:- [phone]

## 2022-07-26 NOTE — PROGRESS NOTES
[facility]    Medicine Progress Note - Hospitalist Service       Date of Admission:  7/19/2022  Principal Problem:    Infection due to 2019 novel coronavirus  Active Problems:    Sepsis (H)    Sepsis, due to unspecified organism, unspecified whether acute organ dysfunction present (H)    ORTIZ (acute kidney injury) (H)     Assessment & Plan          77-year-old female with a Past medical history of trigeminal neuralgia, chronic pain, migraines, hyperlipidemia, hypercalcemia, iron deficiency anemia, depression, pyloric ulcer, intermittent dysphagia admitted for weakness, diarrhea, body aches, cough, fever found to have COVID-19, with sepsis, dysphagia, bacterial pneumonia suspected to be aspiration, ongoing encephalopathy/weakness.  Patient on 7/25 found on imaging to have left frontal brain lesion with vasogenic edema, neurology consulted, need MRI with conscious sedation to rule out infection versus tumor.  Pneumonia relatively asymptomatic, has ongoing encephalopathy likely from brain tumor, polypharmacy from trigeminal neuralgia, COVID/pneumonia, mild worsening hyponatremia.  Neurology, infectious disease following.  Nephrology to see tomorrow for worsening hyponatremia.  MRI planned for tonight.     Confirmed COVID-19 infection                Viral Pneumonia secondary to COVID-19 infection              Bacterial Superinfection              Severe sepsis due to Pneumonia- concern for aspiration PNA/bacterial pneumonia              Fully vaccinated with booster              Bilateral lower lobe infiltrates seen but no hypoxia     Remdesivir initiated 7/19 for 3 days, ID restarted on 7/25 for 2 more days, total 5-day treatment, not a candidate for dexamethasone due to lack of hypoxia              IV fluids discontinued              Meropenem and vancomycin initiated 7/19.  MRSA nasal screen positive, procalcitonin elevated              ECHO 7/21 with intact EF. Previous EF of  70-75%   Appreciate ID consultation, continuing meropenem and vancomycin.   Has mild cough but otherwise no pulmonary symptoms, encourage I-S, start flutter valve.  Unable to self prone   CRP, D-dimer improving, follow CRP and D-dimer.  No fever for 2 days, WBC still elevated.     New onset delirium and encephalopathy/weakness secondary to above              mental status has improved since admission but still very weak    initially holding home topiramate, nortriptyline, Keppra and Trileptal secondary to somnolence.,  Then restarted since mental status improved but still very  weak.  Also on Tylenol 3   Likely secondary to new brain lesion with vasogenic edema, also suspect polypharmacy contributing to fatigue/mental status and COVID-19/pneumonia and  worsening hyponatremia.   Nonfocal neurologic exam.    Unremarkable TSH, B12, folate, CK, ammonia   Patient on nortriptyline, topiramate and Trileptal and Tylenol 3 likely for trigeminal neuralgia, difficult to tell however in clinic notes    Trial of holding nortriptyline, Tylenol 3.    Appreciate pain team and neurology consultation.  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  Question infectious versus cancer   Likely contributing to lethargy and possibly hyponatremia   MRI of the head with and without contrast ordered, patient very claustrophobic, discussed with radiology and anesthesiology, radiology will try  conscious  sedation with Versed and fentanyl, if unsuccessful then anesthesiology will try propofol   Likely will need neurosurgery consultation once confirmed by MRI   EEG shows severe left frontotemporal region slowing with intermittent sharp wave activity, suggestive of left frontotemporal cerebral dysfunction and  epilepsy., Keppra dose increased   Appreciate neurology consultation    Hyponatremia   Gradually worsening,  normal TSH, elevated urine sodium, not taking much oral intake.  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  We will asked  nephrology for assistance since I do not think fluid restriction will work since she is not taking much fluid into begin with.    Diarrhea- improved, now constipated              CT scan showed no acute abnormality in the abdomen or pelvis              Likely related to COVID infection  Start senna, give suppository                 Dysphagia              Reports occasional coughing with meals              Suspected aspiration in February 2022, video swallow at that time showed trace amount of aspiration with thin barium consistency              Video swallow 7/20 with some aspiration with thin liquids              Speech recommending minced and moist diet level 5 and mildly thick liquid.  Tolerating.  Likely secondary to brain lesion     Acute urinary retention               Likely related to acute illness and delirium               Abrams catheter placed 7/21- TOCR prior to discharge   UA shows no signs of infection, CT shows no abnormalities     Acute kidney injury- resolved with IVF               Secondary to volume depletion              Discontinue IV fluid              Monitor for volume overload               Avoid nephrotoxins including NSAIDs     Hypokalemia              Secondary to diarrhea              Replace per protocol-     Hyperglycemia- improved without insulin               Continue to monitor              A1c of 5.6      Trigeminal neuralgia, chronic pain  Patient on Tylenol 3,, topiramate, Trileptal, nortriptyline for her chronic pain/trigeminal neuralgia..  Suspect all of these may be contributing to her encephalopathy.   pain team and neurology following, hold nortriptyline and T3 (has not been using T3 in the past few days)   Currently asymptomatic     Depression              Did not resume mirtazapine as patient reportedly stopped taking this medication due to  somnolence               Resumed home nortriptyline-holding for now given her encephalopathy     Iron deficiency anemia              Decreased a little today.  No signs of GI loss.  Recheck.     General weakness  -poor appetite and oral intake  -encourage oral nutrition  -add supplement  -pt/ot eval  -as per daughter, patient can walk without assistance at baseline     Barriers to discharge: Improvement in mentation, ID recs, IV medication     Anticipated length of stay: Several days        Diet: Minced & Moist Diet (level 5) Mildly Thick (level 2)  Snacks/Supplements Adult: Ensure Enlive; Between Meals  Snacks/Supplements Adult: Magic Cup; With Meals    DVT Prophylaxis: Enoxaparin (Lovenox) SQ  Abrams Catheter: PRESENT, indication: Retention  Central Lines: PRESENT  PICC Triple Lumen 07/22/22 Right Basilic Vascular access-Site Assessment: WDL  Code Status: Full Code      Disposition Plan      Expected Discharge Date: 07/27/2022    Discharge Delays: IV Medication - consider oral or Home Infusion  Placement - TCU  Imaging Result Pending (enter specific test & time in comments)    Discharge Comments: PICC line placed 7/22- IV abx  needs MRI        The patient's care was discussed with the Bedside Nurse, Patient and Patient's daughter by telephone. for total time 40 minutes with greater than 50% of total time spent in counseling and coordination of care.    MIRIAN MENDEZ MD  Hospitalist Service  [facility]  Securely message with the Vocera Web Console (learn more here)  Text page via [email or web address] Paging/Directory        Clinically Significant Risk Factors Present on Admission                      ______________________________________________________________________    Interval History   Remainder of 12 point review of systems negative except as noted below    Subjective:  Patient a little more awake and alert but still very weak.  Tolerating diet but not taking much.  Denies any pain including chest  "pain, facial pain, shortness of breath.  Very claustrophobic, says that oral medication do not work for MRIs.  No bowel movement for several days.  Voiding..  No chest pain or shortness of breath.  No abdominal pain.    Data reviewed today: I reviewed all medications, new labs and imaging results over the last 24 hours.     Physical Exam   Vital Signs: Temp: 97.5  F (36.4  C) Temp src: Oral BP: 117/74 Pulse: 101   Resp: 20 SpO2: 98 % O2 Device: Nasal cannula    Weight: 115 lbs 3.2 oz  Physical Exam:  Temp:  [97.5  F (36.4  C)-98.9  F (37.2  C)] 97.5  F (36.4  C)  Pulse:  [] 101  Resp:  [20-28] 20  BP: (117-164)/(59-74) 117/74  SpO2:  [92 %-98 %] 98 %    /74 (BP Location: Left arm)   Pulse 101   Temp 97.5  F (36.4  C) (Oral)   Resp 20   Ht 1.651 m (5' 5\")   Wt 52.3 kg (115 lb 3.2 oz)   SpO2 98%   BMI 19.17 kg/m    General appearance: alert, appears stated age, cachectic, cooperative and slowed mentation  Head: Normocephalic, without obvious abnormality, atraumatic  Eyes: Clear conjuctiva  Neck: no JVD and supple, symmetrical, trachea midline  Lungs: clear to auscultation bilaterally  Heart: regular rate and rhythm, S1, S2 normal, no murmur, click, rub or gallop  Abdomen: soft, non-tender; bowel sounds normal; no masses,  no organomegaly  Extremities: Negative homans,   Skin: Skin color, texture, turgor normal. No rashes or lesions  Neurologic: Mental status: alertness: Sleepy but arousable  Cranial nerves: normal  Sensory: normal  Motor:grossly normal          Data   Recent Labs   Lab 07/26/22  0856 07/25/22  1716 07/25/22  0619 07/24/22  0643 07/23/22  0734 07/22/22  1941 07/22/22  0849 07/20/22  0923 07/19/22  2255 07/19/22  1901   WBC 13.6*  --  7.2 10.5 12.2*  --  17.3*   < >  --  32.5*   HGB 9.5*  --  8.1* 9.7* 8.7*  --  9.3*   < >  --  11.7   MCV 70*  --  73* 71* 72*  --  73*   < >  --  72*     --  386 416 391  --  387   < >  --  647*   INR  --   --   --   --   --   --   --   --  1.36* "  --    *  --  132*  --  135*  --  142   < >  --  136   POTASSIUM 3.7 4.6 2.8* 3.9 3.7   < > 3.4*   < >  --  3.4*   CHLORIDE 96*  --  105  --  104  --  110*   < >  --  99   CO2 24  --  22  --  27  --  23   < >  --  23   BUN 10  --  9  --  13  --  19   < >  --  28   CR 0.51*  --  0.44* 0.56* 0.57*  --  0.64   < >  --  1.11*   ANIONGAP 9  --  5  --  4*  --  9   < >  --  14   ADY 8.1*  --  6.7*  --  8.4*  --  8.8   < >  --  10.1     --  92  --  88  --  117   < >  --  252*   ALBUMIN  --   --   --   --  2.3*  --  2.4*   < >  --  3.6   PROTTOTAL  --   --   --   --  6.0  --  6.5   < >  --  8.9*   BILITOTAL  --   --   --   --  0.4  --  0.3   < >  --  0.5   ALKPHOS  --   --   --   --  71  --  86   < >  --  134*   ALT  --   --   --   --  34  --  31   < >  --  31   AST  --   --   --   --  30  --  30   < >  --  25   LIPASE  --   --   --   --   --   --   --   --   --  <9    < > = values in this interval not displayed.     Recent Results (from the past 24 hour(s))   CT Head w/o Contrast    Narrative    EXAM: CT HEAD W/O CONTRAST  LOCATION: [facility]  DATE/TIME: 7/25/2022 5:33 PM    INDICATION: enceophalopathy  COMPARISON: 02/08/2022.  TECHNIQUE: Routine CT Head without IV contrast. Multiplanar reformats. Dose reduction techniques were used.    FINDINGS:  INTRACRANIAL CONTENTS: No intracranial hemorrhage, extraaxial collection, or mass effect.  No CT evidence of acute infarct. There is scattered low-attenuation within the periventricular and subcortical white matter consistent with diffuse small vessel   ischemic disease. There are focal areas of encephalomalacia again noted involving both cerebellar hemispheres. Of note there is new edema  involving the white matter of the anterior left frontal lobe especially inferiorly. This does not appear to be   involving the cortex. There is a possible slightly high density mass in the anterior left frontal lobe that measures approximately 1.0 cm  anteriorly posteriorly by 1.9 cm transversely by 0.8 cm craniocaudally as seen on axial image #23 and sagittal image   #40. The ventricular system, basal cisterns and the cortical sulci are consistent with mild diffuse volume loss.     VISUALIZED ORBITS/SINUSES/MASTOIDS: No intraorbital abnormality. No paranasal sinus mucosal disease. No middle ear or mastoid effusion.    BONES/SOFT TISSUES: No acute abnormality.      Impression    IMPRESSION:  1.  New vasogenic edema anterior left frontal lobe with possible small mass lesion.     2.  Recommend MRI brain without and with contrast.    3.  Diffuse age related changes along with encephalomalacia cerebellar hemispheres bilaterally.    4.  No evidence of a midline shift, hemorrhage or focal area suggestive of infarct by CT.     These findings were communicated by phone to Dr. Gagnon at 5:55 PM on 07/25/2025.

## 2022-07-26 NOTE — PROGRESS NOTES
"CLINICAL NUTRITION SERVICES - ASSESSMENT NOTE     Nutrition Prescription    RECOMMENDATIONS FOR MDs/PROVIDERS TO ORDER:  Recommend supplemental enteral nutrition support d/t intake <25% x 8 days   Recommend mvi with minerals  Consider appetite stimulant/antidepressant pending results of MRI    Malnutrition Status:    Unable to assess d/t covid isolation    Recommendations already ordered by Registered Dietitian (RD):  Magic cup tid with meals    Future/Additional Recommendations:  Adjust supplements pending intake, weight, acceptance     REASON FOR ASSESSMENT  Julisa Chaney is a/an 77 year old female assessed by the dietitian for LOS    Pt presents with + covid, encephalopathy, severe sepsis, dysphagia. ORTIZ, diarrhea on admit resolved  Hx depression, trigeminal neuralgia, chronic pain, HLD, iron deficiency anemia, pyloric ulcer    NUTRITION HISTORY  Pt lives with her son, grandson  Ill x 5 days PTA with nausea, diarrhea. Not able to keep anything down x 1 day PTA    CURRENT NUTRITION ORDERS  Diet: Level 5: Minced & Moist Dysphagia Diet, mildly thick -per VFSS 7/20  Supplement:  Ensure enlive tid with meals-added 7/24   Intake/Tolerance: Poor since admit, mostly 0-25%.   No meals were ordered yesterday. 3 meals ordered day prior    Took sips of ensure 7/24  Took 1/2 of a cup of ensure yesterday  Minimal intake of ensure today so far per nsg- pt very sleepy    Received 1 L NS IVF yesterday    LABS  Labs reviewed  On K+ replacement protocol  K+ 3.7, improved from 2.8 yesterday  Na 129 (L), decreased  Ca 8.1 (L), improved from 6.7  Mag 2.3 improved from 1.6  WBC 13.6 (H), increased    MEDICATIONS  Medications reviewed  Iv abx, protonix  K+ and mag replacement yesterday    ANTHROPOMETRICS  Height: 165.1 cm (5' 5\")  Most Recent Weight: 54.6 kg (120 lb 5.9 oz)  7/23, stable with admit  IBW: 56.8 kg  BMI: Normal BMI  Weight History:   Wt Readings from Last 10 Encounters:   07/23/22 54.6 kg (120 lb 5.9 oz)   05/19/22 54.9 " kg (121 lb)   02/17/22 54.3 kg (119 lb 12.8 oz)   02/10/22 53.6 kg (118 lb 1.6 oz)   05/04/21 51.4 kg (113 lb 4.8 oz)   08/21/20 49.9 kg (110 lb)   08/15/20 51.8 kg (114 lb 3.2 oz)   08/15/20 51.8 kg (114 lb 3.2 oz)   08/15/20 51.8 kg (114 lb 3.2 oz)   06/15/20 59 kg (130 lb)   No weight loss noted    Dosing Weight: 54.6 kg    ASSESSED NUTRITION NEEDS  Estimated Energy Needs: 4377-1765 kcals/day (25 - 30 kcals/kg)  Justification: Maintenance  Estimated Protein Needs: 65-82 grams protein/day (1.2 - 1.5 grams of pro/kg)  Justification: Hypercatabolism with acute illness and Repletion  Estimated Fluid Needs: 3631-1307 mL/day (25 - 30 mL/kg)   Justification: Maintenance    PHYSICAL FINDINGS  See malnutrition section below.  Unable to assess d/t covid isolation    Gi  Last BM 7/21 x 1 in setting of minimal intake    MALNUTRITION:  % Weight Loss: None noted  % Intake:  </= 50% for >/= 5 days (severe malnutrition)  Subcutaneous Fat Loss:  Unable to assess  Muscle Loss:  Unable to assess  Fluid Retention: None noted per nsg    Malnutrition Diagnosis: Unable to determine due to unable to perform physical assessement  In Context of:  Acute illness or injury    NUTRITION DIAGNOSIS  Inadequate oral intake related to covid, severe sepsis as evidenced by intake <25% x 8 days, fatigue, lethargy and weakness      INTERVENTIONS  Implementation  -Recommend supplemental enteral nutrition support d/t intake <25% x 8 days   -Recommend mvi with minerals  -Consider appetite stimulant/antidepressant pending findings of MRI  - Add magic cup tid.     Goals  Intake > 75% of estimated needs  Maintain weight       Monitoring/Evaluation  Progress toward goals will be monitored and evaluated per protocol.    Mell Holloway, GONZÁLEZ, LD  #[phone]

## 2022-07-26 NOTE — PROGRESS NOTES
Patient has an order for MRI with and without contrast. Patient daughter called and stated that patient is allergic to contrast.

## 2022-07-27 ENCOUNTER — HOSPITAL ENCOUNTER (INPATIENT)
Facility: CLINIC | Age: 77
LOS: 69 days | Discharge: NURSING, REHAB OR LONG-TERM CARE | DRG: 023 | End: 2022-10-04
Attending: INTERNAL MEDICINE | Admitting: HOSPITALIST
Payer: COMMERCIAL

## 2022-07-27 ENCOUNTER — APPOINTMENT (OUTPATIENT)
Dept: PHYSICAL THERAPY | Facility: HOSPITAL | Age: 77
DRG: 871 | End: 2022-07-27
Payer: COMMERCIAL

## 2022-07-27 ENCOUNTER — APPOINTMENT (OUTPATIENT)
Dept: MRI IMAGING | Facility: HOSPITAL | Age: 77
DRG: 871 | End: 2022-07-27
Attending: FAMILY MEDICINE
Payer: COMMERCIAL

## 2022-07-27 VITALS
WEIGHT: 115.2 LBS | HEART RATE: 100 BPM | SYSTOLIC BLOOD PRESSURE: 132 MMHG | RESPIRATION RATE: 16 BRPM | BODY MASS INDEX: 19.19 KG/M2 | DIASTOLIC BLOOD PRESSURE: 63 MMHG | HEIGHT: 65 IN | TEMPERATURE: 97.8 F | OXYGEN SATURATION: 100 %

## 2022-07-27 DIAGNOSIS — G04.90 ENCEPHALITIS: ICD-10-CM

## 2022-07-27 DIAGNOSIS — G50.0 TRIGEMINAL NEURALGIA: ICD-10-CM

## 2022-07-27 DIAGNOSIS — G04.90 CEREBRAL VENTRICULITIS: Primary | ICD-10-CM

## 2022-07-27 DIAGNOSIS — G06.0 CEREBRAL ABSCESS: ICD-10-CM

## 2022-07-27 DIAGNOSIS — R27.8 ASTERIXIS: ICD-10-CM

## 2022-07-27 DIAGNOSIS — E78.5 HYPERLIPIDEMIA: ICD-10-CM

## 2022-07-27 DIAGNOSIS — G89.4 CHRONIC PAIN SYNDROME: ICD-10-CM

## 2022-07-27 DIAGNOSIS — A41.9 SEPSIS, DUE TO UNSPECIFIED ORGANISM, UNSPECIFIED WHETHER ACUTE ORGAN DYSFUNCTION PRESENT (H): ICD-10-CM

## 2022-07-27 DIAGNOSIS — F41.9 ANXIETY: ICD-10-CM

## 2022-07-27 DIAGNOSIS — E83.52 HYPERCALCEMIA: ICD-10-CM

## 2022-07-27 DIAGNOSIS — U07.1 INFECTION DUE TO 2019 NOVEL CORONAVIRUS: ICD-10-CM

## 2022-07-27 DIAGNOSIS — G43.009 MIGRAINE WITHOUT AURA AND WITHOUT STATUS MIGRAINOSUS, NOT INTRACTABLE: ICD-10-CM

## 2022-07-27 DIAGNOSIS — F32.9 MAJOR DEPRESSIVE DISORDER WITH CURRENT ACTIVE EPISODE, UNSPECIFIED DEPRESSION EPISODE SEVERITY, UNSPECIFIED WHETHER RECURRENT: ICD-10-CM

## 2022-07-27 DIAGNOSIS — N17.9 AKI (ACUTE KIDNEY INJURY) (H): ICD-10-CM

## 2022-07-27 DIAGNOSIS — G06.0 PYOGENIC BRAIN ABSCESS: ICD-10-CM

## 2022-07-27 LAB
ALBUMIN SERPL-MCNC: 2.6 G/DL (ref 3.4–5)
ALBUMIN SERPL-MCNC: 2.6 G/DL (ref 3.5–5)
ALP SERPL-CCNC: 85 U/L (ref 40–150)
ALP SERPL-CCNC: 89 U/L (ref 45–120)
ALT SERPL W P-5'-P-CCNC: 16 U/L (ref 0–50)
ALT SERPL W P-5'-P-CCNC: 18 U/L (ref 0–45)
ANION GAP SERPL CALCULATED.3IONS-SCNC: 7 MMOL/L (ref 3–14)
ANION GAP SERPL CALCULATED.3IONS-SCNC: 9 MMOL/L (ref 5–18)
AST SERPL W P-5'-P-CCNC: 11 U/L (ref 0–45)
AST SERPL W P-5'-P-CCNC: 12 U/L (ref 0–40)
BASOPHILS # BLD AUTO: 0 10E3/UL (ref 0–0.2)
BASOPHILS NFR BLD AUTO: 0 %
BILIRUB DIRECT SERPL-MCNC: 0.1 MG/DL
BILIRUB SERPL-MCNC: 0.3 MG/DL (ref 0–1)
BILIRUB SERPL-MCNC: 0.4 MG/DL (ref 0.2–1.3)
BUN SERPL-MCNC: 11 MG/DL (ref 8–28)
BUN SERPL-MCNC: 14 MG/DL (ref 7–30)
C REACTIVE PROTEIN LHE: 5.7 MG/DL (ref 0–?)
CALCIUM SERPL-MCNC: 8.3 MG/DL (ref 8.5–10.1)
CALCIUM SERPL-MCNC: 8.3 MG/DL (ref 8.5–10.5)
CALCIUM, IONIZED MEASURED: 1.05 MMOL/L (ref 1.11–1.3)
CHLORIDE BLD-SCNC: 101 MMOL/L (ref 94–109)
CHLORIDE BLD-SCNC: 99 MMOL/L (ref 98–107)
CO2 SERPL-SCNC: 23 MMOL/L (ref 22–31)
CO2 SERPL-SCNC: 26 MMOL/L (ref 20–32)
CREAT SERPL-MCNC: 0.36 MG/DL (ref 0.52–1.04)
CREAT SERPL-MCNC: 0.54 MG/DL (ref 0.6–1.1)
EOSINOPHIL # BLD AUTO: 0.1 10E3/UL (ref 0–0.7)
EOSINOPHIL NFR BLD AUTO: 1 %
ERYTHROCYTE [DISTWIDTH] IN BLOOD BY AUTOMATED COUNT: 18.6 % (ref 10–15)
ERYTHROCYTE [DISTWIDTH] IN BLOOD BY AUTOMATED COUNT: 18.6 % (ref 10–15)
GFR SERPL CREATININE-BSD FRML MDRD: >90 ML/MIN/1.73M2
GFR SERPL CREATININE-BSD FRML MDRD: >90 ML/MIN/1.73M2
GLUCOSE BLD-MCNC: 105 MG/DL (ref 70–99)
GLUCOSE BLD-MCNC: 110 MG/DL (ref 70–125)
GLUCOSE BLDC GLUCOMTR-MCNC: 89 MG/DL (ref 70–99)
GLUCOSE BLDC GLUCOMTR-MCNC: 94 MG/DL (ref 70–99)
HCT VFR BLD AUTO: 33.7 % (ref 35–47)
HCT VFR BLD AUTO: 33.9 % (ref 35–47)
HGB BLD-MCNC: 9.6 G/DL (ref 11.7–15.7)
HGB BLD-MCNC: 9.8 G/DL (ref 11.7–15.7)
IMM GRANULOCYTES # BLD: 0 10E3/UL
IMM GRANULOCYTES NFR BLD: 1 %
INR PPP: 1.13 (ref 0.85–1.15)
ION CA PH 7.4: 1.08 MMOL/L (ref 1.11–1.3)
LACTATE SERPL-SCNC: 0.6 MMOL/L (ref 0.7–2)
LYMPHOCYTES # BLD AUTO: 1.3 10E3/UL (ref 0.8–5.3)
LYMPHOCYTES NFR BLD AUTO: 19 %
MAGNESIUM SERPL-MCNC: 2 MG/DL (ref 1.8–2.6)
MCH RBC QN AUTO: 20.6 PG (ref 26.5–33)
MCH RBC QN AUTO: 20.8 PG (ref 26.5–33)
MCHC RBC AUTO-ENTMCNC: 28.5 G/DL (ref 31.5–36.5)
MCHC RBC AUTO-ENTMCNC: 28.9 G/DL (ref 31.5–36.5)
MCV RBC AUTO: 72 FL (ref 78–100)
MCV RBC AUTO: 73 FL (ref 78–100)
MONOCYTES # BLD AUTO: 0.8 10E3/UL (ref 0–1.3)
MONOCYTES NFR BLD AUTO: 11 %
NEUTROPHILS # BLD AUTO: 4.6 10E3/UL (ref 1.6–8.3)
NEUTROPHILS NFR BLD AUTO: 68 %
NRBC # BLD AUTO: 0 10E3/UL
NRBC BLD AUTO-RTO: 0 /100
OSMOLALITY UR: 468 MMOL/KG (ref 100–1200)
PH: 7.45 (ref 7.35–7.45)
PLATELET # BLD AUTO: 547 10E3/UL (ref 150–450)
PLATELET # BLD AUTO: 549 10E3/UL (ref 150–450)
POTASSIUM BLD-SCNC: 3.5 MMOL/L (ref 3.5–5)
POTASSIUM BLD-SCNC: 3.6 MMOL/L (ref 3.4–5.3)
PROT SERPL-MCNC: 6.4 G/DL (ref 6.8–8.8)
PROT SERPL-MCNC: 6.5 G/DL (ref 6–8)
RBC # BLD AUTO: 4.65 10E6/UL (ref 3.8–5.2)
RBC # BLD AUTO: 4.71 10E6/UL (ref 3.8–5.2)
SODIUM SERPL-SCNC: 131 MMOL/L (ref 136–145)
SODIUM SERPL-SCNC: 134 MMOL/L (ref 133–144)
WBC # BLD AUTO: 6.8 10E3/UL (ref 4–11)
WBC # BLD AUTO: 9.9 10E3/UL (ref 4–11)

## 2022-07-27 PROCEDURE — 36415 COLL VENOUS BLD VENIPUNCTURE: CPT | Performed by: HOSPITALIST

## 2022-07-27 PROCEDURE — 85027 COMPLETE CBC AUTOMATED: CPT | Performed by: HOSPITALIST

## 2022-07-27 PROCEDURE — 99233 SBSQ HOSP IP/OBS HIGH 50: CPT | Performed by: PSYCHIATRY & NEUROLOGY

## 2022-07-27 PROCEDURE — 250N000013 HC RX MED GY IP 250 OP 250 PS 637: Performed by: PSYCHIATRY & NEUROLOGY

## 2022-07-27 PROCEDURE — 250N000013 HC RX MED GY IP 250 OP 250 PS 637: Performed by: INTERNAL MEDICINE

## 2022-07-27 PROCEDURE — 99223 1ST HOSP IP/OBS HIGH 75: CPT | Performed by: PHYSICIAN ASSISTANT

## 2022-07-27 PROCEDURE — 200N000001 HC R&B ICU

## 2022-07-27 PROCEDURE — 86140 C-REACTIVE PROTEIN: CPT | Performed by: FAMILY MEDICINE

## 2022-07-27 PROCEDURE — 250N000011 HC RX IP 250 OP 636: Performed by: HOSPITALIST

## 2022-07-27 PROCEDURE — 258N000003 HC RX IP 258 OP 636: Performed by: INTERNAL MEDICINE

## 2022-07-27 PROCEDURE — 99207 PR NO CHARGE LOS: CPT | Performed by: INTERNAL MEDICINE

## 2022-07-27 PROCEDURE — 84450 TRANSFERASE (AST) (SGOT): CPT | Performed by: HOSPITALIST

## 2022-07-27 PROCEDURE — 250N000013 HC RX MED GY IP 250 OP 250 PS 637: Performed by: FAMILY MEDICINE

## 2022-07-27 PROCEDURE — 99239 HOSP IP/OBS DSCHRG MGMT >30: CPT | Performed by: INTERNAL MEDICINE

## 2022-07-27 PROCEDURE — 250N000013 HC RX MED GY IP 250 OP 250 PS 637: Performed by: HOSPITALIST

## 2022-07-27 PROCEDURE — 83735 ASSAY OF MAGNESIUM: CPT | Performed by: FAMILY MEDICINE

## 2022-07-27 PROCEDURE — 85610 PROTHROMBIN TIME: CPT | Performed by: HOSPITALIST

## 2022-07-27 PROCEDURE — 83605 ASSAY OF LACTIC ACID: CPT | Performed by: HOSPITALIST

## 2022-07-27 PROCEDURE — 82248 BILIRUBIN DIRECT: CPT | Performed by: FAMILY MEDICINE

## 2022-07-27 PROCEDURE — 99223 1ST HOSP IP/OBS HIGH 75: CPT | Mod: AI | Performed by: HOSPITALIST

## 2022-07-27 PROCEDURE — 250N000011 HC RX IP 250 OP 636: Performed by: FAMILY MEDICINE

## 2022-07-27 PROCEDURE — 250N000011 HC RX IP 250 OP 636: Performed by: INTERNAL MEDICINE

## 2022-07-27 PROCEDURE — 99207 PR NO BILLABLE SERVICE THIS VISIT: CPT | Performed by: INTERNAL MEDICINE

## 2022-07-27 PROCEDURE — 70551 MRI BRAIN STEM W/O DYE: CPT

## 2022-07-27 PROCEDURE — 87040 BLOOD CULTURE FOR BACTERIA: CPT | Performed by: HOSPITALIST

## 2022-07-27 PROCEDURE — 82330 ASSAY OF CALCIUM: CPT | Performed by: FAMILY MEDICINE

## 2022-07-27 RX ORDER — ONDANSETRON 2 MG/ML
4 INJECTION INTRAMUSCULAR; INTRAVENOUS EVERY 6 HOURS PRN
Status: DISCONTINUED | OUTPATIENT
Start: 2022-07-27 | End: 2022-10-04 | Stop reason: HOSPADM

## 2022-07-27 RX ORDER — PROCHLORPERAZINE 25 MG
12.5 SUPPOSITORY, RECTAL RECTAL EVERY 12 HOURS PRN
Status: DISCONTINUED | OUTPATIENT
Start: 2022-07-27 | End: 2022-10-04 | Stop reason: HOSPADM

## 2022-07-27 RX ORDER — LEVETIRACETAM 5 MG/ML
500 INJECTION INTRAVASCULAR EVERY 12 HOURS
Status: DISCONTINUED | OUTPATIENT
Start: 2022-07-27 | End: 2022-07-28

## 2022-07-27 RX ORDER — ACETAMINOPHEN 650 MG/1
650 SUPPOSITORY RECTAL EVERY 4 HOURS PRN
Status: DISCONTINUED | OUTPATIENT
Start: 2022-07-27 | End: 2022-07-30

## 2022-07-27 RX ORDER — VANCOMYCIN HYDROCHLORIDE 1 G/200ML
1000 INJECTION, SOLUTION INTRAVENOUS EVERY 12 HOURS
Qty: 30 ML | Refills: 0 | Status: ON HOLD | OUTPATIENT
Start: 2022-07-28 | End: 2022-07-27

## 2022-07-27 RX ORDER — OXCARBAZEPINE 150 MG/1
450 TABLET, FILM COATED ORAL AT BEDTIME
Qty: 60 TABLET | Refills: 0 | Status: ON HOLD | OUTPATIENT
Start: 2022-07-27 | End: 2022-10-04

## 2022-07-27 RX ORDER — METRONIDAZOLE 500 MG/1
500 TABLET ORAL 2 TIMES DAILY
Qty: 20 TABLET | Refills: 0 | Status: ON HOLD | OUTPATIENT
Start: 2022-07-27 | End: 2022-07-27

## 2022-07-27 RX ORDER — NICOTINE POLACRILEX 4 MG
15-30 LOZENGE BUCCAL
Status: DISCONTINUED | OUTPATIENT
Start: 2022-07-27 | End: 2022-10-04 | Stop reason: HOSPADM

## 2022-07-27 RX ORDER — TOPIRAMATE 50 MG/1
50 TABLET, FILM COATED ORAL AT BEDTIME
Status: DISCONTINUED | OUTPATIENT
Start: 2022-07-27 | End: 2022-07-30

## 2022-07-27 RX ORDER — ONDANSETRON 2 MG/ML
4 INJECTION INTRAMUSCULAR; INTRAVENOUS EVERY 6 HOURS PRN
Qty: 16 ML | Refills: 0 | Status: ON HOLD | OUTPATIENT
Start: 2022-07-27 | End: 2022-07-27

## 2022-07-27 RX ORDER — IPRATROPIUM BROMIDE AND ALBUTEROL SULFATE 2.5; .5 MG/3ML; MG/3ML
3 SOLUTION RESPIRATORY (INHALATION) EVERY 4 HOURS PRN
Status: DISCONTINUED | OUTPATIENT
Start: 2022-07-27 | End: 2022-10-04 | Stop reason: HOSPADM

## 2022-07-27 RX ORDER — VANCOMYCIN HYDROCHLORIDE 1 G/200ML
1000 INJECTION, SOLUTION INTRAVENOUS EVERY 12 HOURS
Status: DISCONTINUED | OUTPATIENT
Start: 2022-07-28 | End: 2022-08-15

## 2022-07-27 RX ORDER — LEVETIRACETAM 1000 MG/1
1000 TABLET ORAL 2 TIMES DAILY
Qty: 60 TABLET | Refills: 0 | Status: ON HOLD | OUTPATIENT
Start: 2022-07-27 | End: 2022-07-27

## 2022-07-27 RX ORDER — POTASSIUM CHLORIDE 1.5 G/1.58G
20 POWDER, FOR SOLUTION ORAL ONCE
Status: COMPLETED | OUTPATIENT
Start: 2022-07-27 | End: 2022-07-27

## 2022-07-27 RX ORDER — POLYVINYL ALCOHOL 14 MG/ML
1 SOLUTION/ DROPS OPHTHALMIC
Qty: 60 ML | Refills: 0 | Status: SHIPPED | OUTPATIENT
Start: 2022-07-27 | End: 2024-04-15

## 2022-07-27 RX ORDER — QUETIAPINE FUMARATE 25 MG/1
12.5 TABLET, FILM COATED ORAL EVERY 6 HOURS PRN
Qty: 30 TABLET | Refills: 0 | Status: ON HOLD | OUTPATIENT
Start: 2022-07-27 | End: 2022-07-27

## 2022-07-27 RX ORDER — ACETAMINOPHEN 325 MG/1
650 TABLET ORAL EVERY 4 HOURS PRN
Status: DISCONTINUED | OUTPATIENT
Start: 2022-07-27 | End: 2022-07-30

## 2022-07-27 RX ORDER — PROCHLORPERAZINE MALEATE 5 MG
5 TABLET ORAL EVERY 6 HOURS PRN
Status: DISCONTINUED | OUTPATIENT
Start: 2022-07-27 | End: 2022-10-04 | Stop reason: HOSPADM

## 2022-07-27 RX ORDER — ONDANSETRON 4 MG/1
4 TABLET, ORALLY DISINTEGRATING ORAL EVERY 6 HOURS PRN
Status: DISCONTINUED | OUTPATIENT
Start: 2022-07-27 | End: 2022-10-04 | Stop reason: HOSPADM

## 2022-07-27 RX ORDER — METRONIDAZOLE 500 MG/100ML
500 INJECTION, SOLUTION INTRAVENOUS EVERY 8 HOURS
Status: DISCONTINUED | OUTPATIENT
Start: 2022-07-28 | End: 2022-08-12

## 2022-07-27 RX ORDER — DEXTROSE MONOHYDRATE 25 G/50ML
25-50 INJECTION, SOLUTION INTRAVENOUS
Status: DISCONTINUED | OUTPATIENT
Start: 2022-07-27 | End: 2022-10-04 | Stop reason: HOSPADM

## 2022-07-27 RX ADMIN — LEVETIRACETAM 500 MG: 5 INJECTION INTRAVENOUS at 22:57

## 2022-07-27 RX ADMIN — VANCOMYCIN HYDROCHLORIDE 1000 MG: 1 INJECTION, SOLUTION INTRAVENOUS at 16:23

## 2022-07-27 RX ADMIN — PANTOPRAZOLE SODIUM 40 MG: 20 TABLET, DELAYED RELEASE ORAL at 16:26

## 2022-07-27 RX ADMIN — ACETAMINOPHEN 650 MG: 325 TABLET ORAL at 16:26

## 2022-07-27 RX ADMIN — VANCOMYCIN HYDROCHLORIDE 1000 MG: 1 INJECTION, SOLUTION INTRAVENOUS at 03:19

## 2022-07-27 RX ADMIN — PANTOPRAZOLE SODIUM 40 MG: 20 TABLET, DELAYED RELEASE ORAL at 06:45

## 2022-07-27 RX ADMIN — POTASSIUM CHLORIDE 20 MEQ: 1.5 POWDER, FOR SOLUTION ORAL at 16:26

## 2022-07-27 RX ADMIN — ACETAMINOPHEN 650 MG: 325 TABLET ORAL at 06:45

## 2022-07-27 RX ADMIN — ACETAMINOPHEN 650 MG: 325 TABLET ORAL at 23:37

## 2022-07-27 RX ADMIN — LEVETIRACETAM 1000 MG: 500 TABLET, FILM COATED ORAL at 08:41

## 2022-07-27 RX ADMIN — CEFEPIME HYDROCHLORIDE 2 G: 2 INJECTION, POWDER, FOR SOLUTION INTRAVENOUS at 14:39

## 2022-07-27 RX ADMIN — IPRATROPIUM BROMIDE AND ALBUTEROL 2 PUFF: 20; 100 SPRAY, METERED RESPIRATORY (INHALATION) at 13:08

## 2022-07-27 RX ADMIN — TOPIRAMATE 50 MG: 50 TABLET, FILM COATED ORAL at 23:37

## 2022-07-27 RX ADMIN — LIDOCAINE 1 PATCH: 246 PATCH TOPICAL at 14:40

## 2022-07-27 RX ADMIN — CEFEPIME HYDROCHLORIDE 2 G: 2 INJECTION, POWDER, FOR SOLUTION INTRAVENOUS at 22:21

## 2022-07-27 RX ADMIN — CEFEPIME HYDROCHLORIDE 2 G: 2 INJECTION, POWDER, FOR SOLUTION INTRAVENOUS at 01:21

## 2022-07-27 RX ADMIN — IPRATROPIUM BROMIDE AND ALBUTEROL 2 PUFF: 20; 100 SPRAY, METERED RESPIRATORY (INHALATION) at 08:38

## 2022-07-27 RX ADMIN — OXCARBAZEPINE 450 MG: 300 TABLET, FILM COATED ORAL at 23:38

## 2022-07-27 RX ADMIN — ENOXAPARIN SODIUM 40 MG: 40 INJECTION SUBCUTANEOUS at 07:00

## 2022-07-27 RX ADMIN — SODIUM CHLORIDE, PRESERVATIVE FREE 50 ML: 5 INJECTION INTRAVENOUS at 14:42

## 2022-07-27 RX ADMIN — ACETAMINOPHEN 650 MG: 325 TABLET ORAL at 08:46

## 2022-07-27 RX ADMIN — METRONIDAZOLE 500 MG: 500 TABLET ORAL at 08:42

## 2022-07-27 RX ADMIN — SENNOSIDES 8.6 MG: 8.6 TABLET, FILM COATED ORAL at 08:42

## 2022-07-27 RX ADMIN — METRONIDAZOLE 500 MG: 500 INJECTION, SOLUTION INTRAVENOUS at 23:41

## 2022-07-27 RX ADMIN — IPRATROPIUM BROMIDE AND ALBUTEROL 2 PUFF: 20; 100 SPRAY, METERED RESPIRATORY (INHALATION) at 16:26

## 2022-07-27 ASSESSMENT — ACTIVITIES OF DAILY LIVING (ADL)
ADLS_ACUITY_SCORE: 41
ADLS_ACUITY_SCORE: 37
ADLS_ACUITY_SCORE: 33
ADLS_ACUITY_SCORE: 33
ADLS_ACUITY_SCORE: 37
ADLS_ACUITY_SCORE: 33
ADLS_ACUITY_SCORE: 37
ADLS_ACUITY_SCORE: 29
ADLS_ACUITY_SCORE: 33

## 2022-07-27 NOTE — PROGRESS NOTES
[facility]    Medicine Progress Note - Hospitalist Service    Date of Admission:  7/19/2022    Assessment & Plan                    Diet: Minced & Moist Diet (level 5) Mildly Thick (level 2)  Snacks/Supplements Adult: Ensure Enlive; Between Meals  Snacks/Supplements Adult: Magic Cup; With Meals    DVT Prophylaxis: Heparin SQ  Abrams Catheter: PRESENT, indication: Retention  Central Lines: PRESENT  PICC Triple Lumen 07/22/22 Right Basilic Vascular access-Site Assessment: WDL  Cardiac Monitoring: None  Code Status: Full Code      Disposition Plan      Expected Discharge Date: 07/28/2022    Discharge Delays: IV Medication - consider oral or Home Infusion  Placement - TCU  Imaging Result Pending (enter specific test & time in comments)    Discharge Comments: PICC line placed 7/22- IV abx  needs MRI        The patient's care was discussed with the Attending Physician, Dr. Montenegro.    Gemini Montenegro MD  Hospitalist Service  [facility]  Securely message with the Vocera Web Console (learn more here)  Text page via [employer] Paging/Directory         Clinically Significant Risk Factors Present on Admission                      ______________________________________________________________________    Interval History   {Include any events overnight / new information. 4 point ROS is needed for billing a level III (233):    Data reviewed today: I reviewed all medications, new labs and imaging results over the last 24 hours. I personally reviewed no images or EKG's today.    Physical Exam   Vital Signs: Temp: 97.2  F (36.2  C) Temp src: Oral BP: 138/63 Pulse: 102   Resp: 20 SpO2: 97 % O2 Device: None (Room air)    Weight: 115 lbs 3.2 oz  General Appearance: Alert ,confused   Respiratory: poor effort ,rales at bases  Cardiovascular: RRR  GI: soft abdomen   Skin: no rash ,dry     Data     Confirmed COVID-19 infection                Viral Pneumonia secondary to COVID-19  infection              Bacterial Superinfection              Severe sepsis due to Pneumonia- concern for aspiration PNA/bacterial pneumonia              Fully vaccinated with booster              Bilateral lower lobe infiltrates seen but no hypoxia              Remdesivir initiated 7/19 for 3 days, ID restarted on 7/25 for 2 more days, total 5-day treatment, not a candidate for dexamethasone due to lack of hypoxia              IV fluids discontinued              Meropenem and vancomycin initiated 7/19.  MRSA nasal screen positive, procalcitonin elevated              ECHO 7/21 with intact EF. Previous EF of 70-75%              Appreciate ID consultation, continuing meropenem and vancomycin.              Has mild cough but otherwise no pulmonary symptoms, encourage I-S, start flutter valve.  Unable to self prone              CRP, D-dimer improving, follow CRP and D-dimer.  No fever for 2 days, WBC still elevated.     New onset delirium and encephalopathy/weakness secondary to above              mental status has improved since admission but still very weak               initially holding home topiramate, nortriptyline, Keppra and Trileptal secondary to somnolence.,  Then restarted since mental status improved but still very   weak.  Also on Tylenol 3              Likely secondary to new brain lesion with vasogenic edema, also suspect polypharmacy contributing to fatigue/mental status and COVID-19/pneumonia and          worsening hyponatremia.            Nonfocal neurologic exam.               Unremarkable TSH, B12, folate, CK, ammonia              Patient on nortriptyline, topiramate and Trileptal and Tylenol 3 likely for trigeminal neuralgia, difficult to tell however in clinic notes               Trial of holding nortriptyline, Tylenol 3.               Appreciate pain team and neurology consultation.  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  Question infectious versus cancer              Likely contributing to lethargy and possibly hyponatremia              MRI of the head with and without contrast ordered, patient very claustrophobic, discussed with radiology and anesthesiology, radiology will try       conscious        sedation with Versed and fentanyl, if unsuccessful then anesthesiology will try propofol              Likely will need neurosurgery consultation once confirmed by MRI              EEG shows severe left frontotemporal region slowing with intermittent sharp wave activity, suggestive of left frontotemporal cerebral dysfunction and   epilepsy., Keppra dose increased              Appreciate neurology consultation     Hyponatremia              Gradually worsening, normal TSH, elevated urine sodium, not taking much oral intake.  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  We will asked  nephrology for assistance since I do not think fluid restriction will work since she is not taking much fluid into begin with.     Diarrhea- improved, now constipated              CT scan showed no acute abnormality in the abdomen or pelvis              Likely related to COVID infection  Start senna, give suppository                 Dysphagia              Reports occasional coughing with meals              Suspected aspiration in February 2022, video swallow at that time showed trace amount of aspiration with thin barium consistency              Video swallow 7/20 with some aspiration with thin liquids              Speech recommending minced and moist diet level 5 and mildly thick liquid.  Tolerating.  Likely secondary to brain lesion     Acute urinary retention               Likely related to acute illness and delirium               Abrams catheter placed 7/21- TOCR prior to discharge   UA shows no signs of infection, CT shows no abnormalities     Acute kidney injury- resolved  with IVF               Secondary to volume depletion              Discontinue IV fluid              Monitor for volume overload               Avoid nephrotoxins including NSAIDs     Hypokalemia              Secondary to diarrhea              Replace per protocol-     Hyperglycemia- improved without insulin               Continue to monitor              A1c of 5.6      Trigeminal neuralgia, chronic pain  Patient on Tylenol 3,, topiramate, Trileptal, nortriptyline for her chronic pain/trigeminal neuralgia..  Suspect all of these may be contributing to her encephalopathy.   pain team and neurology following, hold nortriptyline and T3 (has not been using T3 in the past few days)   Currently asymptomatic      Depression              Did not resume mirtazapine as patient reportedly stopped taking this medication due to somnolence               Resumed home nortriptyline-holding for now given her encephalopathy     Iron deficiency anemia              Decreased a little today.  No signs of GI loss.  Recheck.     General weakness  -poor appetite and oral intake  -encourage oral nutrition  -add supplement  -pt/ot eval  -as per daughter, patient can walk without assistance at baseline    [facility]  Infectious Disease  Progress Note     Date of Admission:  7/19/2022    Assessment & Plan   COVID19 pneumonia, vaccinated, syx 7/15  Bacterial pneumonia as PCT is high at 2  Trace amount of aspiration with thin barium consistency  MRSA screen positive  PCN allergy, rash  encephalopathy seems improved, but still looks weak, little appetite  Leukocytosis down, CRP down, not on oxygen sat 98%! This is sec to control of bacterial infection. So fever likely from covid.       PLAN  CXR  On vanco meropenem  Treat x 8 days-till 7/27  RDV got 3, complete 5 days  Not on dexa since not hypoxic  Trend fevers and inflammatory markers, PCT    Faiza Chandler,  CRISTIANO.D.    ______________________________________________________________________    Interval History   Headache  Alert  Looks weak    All 12 systems reviewed, negative except for the above.  Reliability?    Physical Exam   Vital Signs: Temp: 97.2  F (36.2  C) Temp src: Oral BP: 138/63 Pulse: 102   Resp: 20 SpO2: 97 % O2 Device: None (Room air)    Weight: 115 lbs 3.2 oz  Gen. appearance nontoxic  Eyes no conjunctivitis or icterus  Neck no stiffness or neck vein distention, no LN  Heart  No edema  Lungs breathing comfortably  Abdomen soft not tender  Extremities no synovitis, trace edema  Skin  no rash or emboli  Neurologic alert oriented no focal deficits oriented to two no tremors    Data   Results for orders placed or performed during the hospital encounter of 07/19/22 (from the past 24 hour(s))   CBC with platelets   Result Value Ref Range    WBC Count 13.6 (H) 4.0 - 11.0 10e3/uL    RBC Count 4.63 3.80 - 5.20 10e6/uL    Hemoglobin 9.5 (L) 11.7 - 15.7 g/dL    Hematocrit 32.6 (L) 35.0 - 47.0 %    MCV 70 (L) 78 - 100 fL    MCH 20.5 (L) 26.5 - 33.0 pg    MCHC 29.1 (L) 31.5 - 36.5 g/dL    RDW 18.0 (H) 10.0 - 15.0 %    Platelet Count 433 150 - 450 10e3/uL   CRP inflammation   Result Value Ref Range    CRP 4.6 (H) 0.0 - <0.8 mg/dL   D dimer quantitative   Result Value Ref Range    D-Dimer Quantitative 0.82 (H) 0.00 - 0.50 ug/mL FEU    Narrative    This D-dimer assay is intended for use in conjunction with a clinical pretest probability assessment model to exclude pulmonary embolism (PE) and deep venous thrombosis (DVT) in outpatients suspected of PE or DVT. The cut-off value is 0.50 ug/mL FEU.   Ionized Calcium   Result Value Ref Range    Calcium, Ionized pH 7.4 1.07 (L) 1.11 - 1.30 mmol/L    pH 7.44 7.35 - 7.45    Calcium, Ionized Measured 1.04 (L) 1.11 - 1.30 mmol/L   Basic metabolic panel   Result Value Ref Range    Sodium 129 (L) 136 - 145 mmol/L    Potassium 3.7 3.5 - 5.0 mmol/L    Chloride 96 (L) 98 - 107 mmol/L     Carbon Dioxide (CO2) 24 22 - 31 mmol/L    Anion Gap 9 5 - 18 mmol/L    Urea Nitrogen 10 8 - 28 mg/dL    Creatinine 0.51 (L) 0.60 - 1.10 mg/dL    Calcium 8.1 (L) 8.5 - 10.5 mg/dL    Glucose 103 70 - 125 mg/dL    GFR Estimate >90 >60 mL/min/1.73m2   Magnesium   Result Value Ref Range    Magnesium 2.3 1.8 - 2.6 mg/dL   Keppra (Levetiracetam) Level   Result Value Ref Range    Keppra (Levetiracetam) Level 6.0 (L) 10.0 - 40.0  g/mL   Sodium random urine   Result Value Ref Range    Sodium Urine mmol/L 124 mmol/L   Osmolality urine   Result Value Ref Range    Osmolality Urine 468 100 - 1,200 mmol/kg    Narrative    Reference Ranges depend on patient's hydration status and renal function.   Neonates:  mmol/kg   2 years and older, random specimens: 100-1200 mmol/kg; Greater than 850 mmol/kg after 12 hour fluid restriction  Urine/serum osmolality ratio: 2 years and older: 1.0-3.0; 3.0-4.7 after 12 hour fluid restriction   CBC with platelets   Result Value Ref Range    WBC Count 9.9 4.0 - 11.0 10e3/uL    RBC Count 4.71 3.80 - 5.20 10e6/uL    Hemoglobin 9.8 (L) 11.7 - 15.7 g/dL    Hematocrit 33.9 (L) 35.0 - 47.0 %    MCV 72 (L) 78 - 100 fL    MCH 20.8 (L) 26.5 - 33.0 pg    MCHC 28.9 (L) 31.5 - 36.5 g/dL    RDW 18.6 (H) 10.0 - 15.0 %    Platelet Count 549 (H) 150 - 450 10e3/uL   CRP inflammation   Result Value Ref Range    CRP 5.7 (H) 0.0 - <0.8 mg/dL   Basic metabolic panel   Result Value Ref Range    Sodium 131 (L) 136 - 145 mmol/L    Potassium 3.5 3.5 - 5.0 mmol/L    Chloride 99 98 - 107 mmol/L    Carbon Dioxide (CO2) 23 22 - 31 mmol/L    Anion Gap 9 5 - 18 mmol/L    Urea Nitrogen 11 8 - 28 mg/dL    Creatinine 0.54 (L) 0.60 - 1.10 mg/dL    Calcium 8.3 (L) 8.5 - 10.5 mg/dL    Glucose 110 70 - 125 mg/dL    GFR Estimate >90 >60 mL/min/1.73m2   Ionized Calcium   Result Value Ref Range    Calcium, Ionized pH 7.4 1.08 (L) 1.11 - 1.30 mmol/L    pH 7.45 7.35 - 7.45    Calcium, Ionized Measured 1.05 (L) 1.11 - 1.30 mmol/L  "  Magnesium   Result Value Ref Range    Magnesium 2.0 1.8 - 2.6 mg/dL   Hepatic function panel   Result Value Ref Range    Bilirubin Total 0.3 0.0 - 1.0 mg/dL    Bilirubin Direct 0.1 <=0.5 mg/dL    Protein Total 6.5 6.0 - 8.0 g/dL    Albumin 2.6 (L) 3.5 - 5.0 g/dL    Alkaline Phosphatase 89 45 - 120 U/L    AST 12 0 - 40 U/L    ALT 18 0 - 45 U/L        Hospital Medications    remdesivir  100 mg Intravenous Q24H     And     sodium chloride 0.9%  50 mL Intravenous Q24H     ceFEPIme (MAXIPIME) IV  2 g Intravenous Q12H     enoxaparin ANTICOAGULANT  40 mg Subcutaneous Q24H     ipratropium-albuterol  2 puff Inhalation 4x Daily     levETIRAcetam  1,000 mg Oral BID     lidocaine  1 patch Transdermal Q24H     And     lidocaine   Transdermal Q8H MARCIA     metroNIDAZOLE  500 mg Oral BID     [Held by provider] nortriptyline  50 mg Oral BID     OXcarbazepine  450 mg Oral At Bedtime     pantoprazole  40 mg Oral BID AC     sennosides  8.6 mg Oral BID     sodium chloride (PF)  3 mL Intracatheter Q8H     topiramate  50 mg Oral At Bedtime     vancomycin  1,000 mg Intravenous Q12H          PHYSICAL EXAM      Vital signs  Temp:  [97.5  F (36.4  C)-98.9  F (37.2  C)] 97.5  F (36.4  C)  Pulse:  [] 101  Resp:  [20-24] 20  BP: (117-164)/(59-74) 117/74  SpO2:  [92 %-98 %] 98 %     PHYSICAL EXAMINATION  VITALS: /74 (BP Location: Left arm)   Pulse 101   Temp 97.5  F (36.4  C) (Oral)   Resp 20   Ht 1.651 m (5' 5\")   Wt 52.3 kg (115 lb 3.2 oz)   SpO2 98%   BMI 19.17 kg/m    GENERAL -Health appearing, No apparent distress  EYES- No scleral icterus, no eyelid droop, Pupils - see Neuro section  HEENT - Normocephalic, atraumatic, Hearing grossly intact; Oral mucosa moist and pink in color. External Ears and nose intact.   Neck - supple with no obvious lymphadenopathy or thyromegaly  PULM - Good spontaneous respiratory effort; Normal palpation of chest.   CV- Pedal pulses present with no peripheral edema/ No significant " varicosities.  MSK- Gait - see Neuro section; Strength and tone- see Neuro section; Range of motion grossly intact.  PSYCH -cooperative    Neurological  Mental status - Patient is awake and partially oriented to self, place and time. Attention span is slightly decreased. Language is fluent though limited to few phrases.  Follows some very simple commands.  Cranial nerves - CN II-XII intact. Pupils are reactive and symmetric; EOMI, VFIT, NLF symmetric  Motor - There is no pronator drift. Motor exam shows 5/5 strength grossly in all extremities.  Tone - Tone is symmetric bilaterally in upper and lower extremities.  Sensation - Sensory exam is grossly intact to light touch, pain.  Coordination - Finger to nose is without dysmetria.  Unable to follow commands for heel-to-shin.  Gait and station --unable to safely ambulate due to altered mental status.  Formal gait testing cannot be done due to safety concerns from ongoing medical issues.  Exam slightly better compared to yesterday though no significant difference.      DIAGNOSTIC STUDIES      Pertinent Radiology   CT head  IMPRESSION:  1.  New vasogenic edema anterior left frontal lobe with possible small mass lesion.      2.  Recommend MRI brain without and with contrast.     3.  Diffuse age related changes along with encephalomalacia cerebellar hemispheres bilaterally.     4.  No evidence of a midline shift, hemorrhage or focal area suggestive of infarct by CT.      These findings were communicated by phone to Dr. Gagnon at 5:55 PM on 07/25/2025.     EEG  IMPRESSION/REPORT/PLAN  This is a normal EEG during wakefulness and drowsiness except for mild generalized background dysrhythmia. EEG is not suggestive of ongoing seizures or encephalopathy. Further clinical correlation is needed.     Please note that the absence of epileptiform abnormalities does not exclude the possibility of epilepsy in any patient.      2/8/22  IMPRESSION:  1.  No evidence of acute large  territorial infarction, acute intracranial hemorrhage, or mass lesion.  2.  Stable chronic bilateral cerebellar infarcts.  3.  Stable mild chronic small vessel ischemic change and mild diffuse brain parenchymal volume loss.     Component      Latest Ref Rng & Units 7/22/2022 7/24/2022 7/25/2022   Bilirubin Total      0.0 - 1.0 mg/dL 0.3       Bilirubin Direct      <=0.5 mg/dL 0.2       Protein Total      6.0 - 8.0 g/dL 6.5       Albumin      3.5 - 5.0 g/dL 2.4 (L)       Alkaline Phosphatase      45 - 120 U/L 86       AST      0 - 40 U/L 30       ALT      0 - 45 U/L 31       Creatinine      0.60 - 1.10 mg/dL   0.56 (L)     GFR Estimate      >60 mL/min/1.73m2   >90     Keppra (Levetiracetam) Level      10.0 - 40.0  g/mL 8.7 (L)       Topiramate Level      5.0 - 20.0 ug/mL 2.0 (L)       Oxcarb or Eslicarb Metabolite (MHD)      3 - 35 ug/mL 13       Magnesium      1.8 - 2.6 mg/dL     1.6 (L)   Vitamin B12      232 - 1,245 pg/mL     1,536 (H)   TSH      0.30 - 5.00 uIU/mL     0.56   CK Total      30 - 190 U/L     14 (L)   Ammonia      11 - 35 umol/L     30      Component      Latest Ref Rng & Units 7/26/2022   Keppra (Levetiracetam) Level      10.0 - 40.0  g/mL 6.0 (L)      EEG     IMPRESSION/REPORT/PLAN  This is an abnormal EEG during wakefulness and drowsiness due to generalized slowing and more severe left frontotemporal region slowing with intermittent sharp wave activity.  EEG is suggestive of left frontotemporal cerebral dysfunction and epilepsy.  EEG is also suggestive of an ongoing encephalopathy.  Further clinical correlation is needed.     Please note that the absence of epileptiform abnormalities does not exclude the possibility of epilepsy in any patient.      Recent Results         Recent Results (from the past 24 hour(s))   CBC with platelets     Collection Time: 07/26/22  8:56 AM   Result Value Ref Range     WBC Count 13.6 (H) 4.0 - 11.0 10e3/uL     RBC Count 4.63 3.80 - 5.20 10e6/uL     Hemoglobin 9.5 (L)  11.7 - 15.7 g/dL     Hematocrit 32.6 (L) 35.0 - 47.0 %     MCV 70 (L) 78 - 100 fL     MCH 20.5 (L) 26.5 - 33.0 pg     MCHC 29.1 (L) 31.5 - 36.5 g/dL     RDW 18.0 (H) 10.0 - 15.0 %     Platelet Count 433 150 - 450 10e3/uL   CRP inflammation     Collection Time: 07/26/22  8:56 AM   Result Value Ref Range     CRP 4.6 (H) 0.0 - <0.8 mg/dL   D dimer quantitative     Collection Time: 07/26/22  8:56 AM   Result Value Ref Range     D-Dimer Quantitative 0.82 (H) 0.00 - 0.50 ug/mL FEU   Ionized Calcium     Collection Time: 07/26/22  8:56 AM   Result Value Ref Range     Calcium, Ionized pH 7.4 1.07 (L) 1.11 - 1.30 mmol/L     pH 7.44 7.35 - 7.45     Calcium, Ionized Measured 1.04 (L) 1.11 - 1.30 mmol/L   Basic metabolic panel     Collection Time: 07/26/22  8:56 AM   Result Value Ref Range     Sodium 129 (L) 136 - 145 mmol/L     Potassium 3.7 3.5 - 5.0 mmol/L     Chloride 96 (L) 98 - 107 mmol/L     Carbon Dioxide (CO2) 24 22 - 31 mmol/L     Anion Gap 9 5 - 18 mmol/L     Urea Nitrogen 10 8 - 28 mg/dL     Creatinine 0.51 (L) 0.60 - 1.10 mg/dL     Calcium 8.1 (L) 8.5 - 10.5 mg/dL     Glucose 103 70 - 125 mg/dL     GFR Estimate >90 >60 mL/min/1.73m2   Magnesium     Collection Time: 07/26/22  8:56 AM   Result Value Ref Range     Magnesium 2.3 1.8 - 2.6 mg/dL   Keppra (Levetiracetam) Level     Collection Time: 07/26/22  8:56 AM   Result Value Ref Range     Keppra (Levetiracetam) Level 6.0 (L) 10.0 - 40.0  g/mL   Sodium random urine     Collection Time: 07/26/22 11:49 AM   Result Value Ref Range     Sodium Urine mmol/L 124 mmol/L            Total time spent for face to face visit, reviewing labs/imaging studies, counseling and coordination of care was: Over 35 min More than 50% of this time was spent on counseling and coordination of care.     Patient is COVID-positive which requires extra time.  Reviewing imaging studies.  Discussion of MRI with the hospitalist.  Getting EEG and reviewing results.  Adjustment of Keppra  dosing.     Jamie Clarke MD  Neurologist  [facility]  Tel:- [phone]

## 2022-07-27 NOTE — PLAN OF CARE
Patient arouses with voice. Very weak and sleepy. Patient appetite is poor thus very low oral intake-encourage food and supplements. She is afebrile. Lungs sound diminished- she is on room air. Patient is tolerating IV antibiotics. Monitor.   Problem: Fluid Imbalance (Pneumonia)  Goal: Fluid Balance  Outcome: Ongoing, Progressing     Problem: Pain Chronic (Persistent) (Comorbidity Management)  Goal: Acceptable Pain Control and Functional Ability  Outcome: Ongoing, Progressing

## 2022-07-27 NOTE — PROGRESS NOTES
Care Management Follow Up    Length of Stay (days): 8    Expected Discharge Date: 08/01/2022     Concerns to be Addressed:     Medical progression  Patient plan of care discussed at interdisciplinary rounds: No    Anticipated Discharge Disposition: TCU      Anticipated Discharge Services: TCU   Anticipated Discharge DME:  TBD    Patient/family educated on Medicare website which has current facility and service quality ratings:    Education Provided on the Discharge Plan:    Patient/Family in Agreement with the Plan:      Referrals Placed by CM/SW: TCU   Private pay costs discussed: Not applicable    Additional Information:  CM reached out to pt and daughter about the recommendation for TCU at discharge. Daughter said she would be interested in [facility] but are accepting to close to the [facility]. CM sent referrals.      Leigh Yin RN

## 2022-07-27 NOTE — PROGRESS NOTES
Neurosurgery Note:    Consult received for 77 year old female with COVID 19 pneumonia and severe sepsis associated with ongoing encephalopathy, ID Infolved - on cefepime. CT obtained 7/25 showed possible mass density in the left frontal lobe with surrounding vasogenic edema. MRI delayed as patient initially refused but partially completed on 7/27 where there was restricted diffusion in the left frontal horn of the lateral ventricle, some dependent material in the bilateral occipital horns. In the setting of infection this raises concern for intracranial abscess with ventriculitis - there is early hydrocephalus as well on imaging. Patient needs transfer to facility with cranial services, [facility] would be appropriate. If she were to clinically deteriorate, we do not have cranial surgical capabilities at [facility]. I discussed this with hospitalist, neurology, and ID. Imaging and plan was reviewed with Dr. Sears, Dr. Sears would recommend holding off on LP until transfer. Please call if we can be of further assistance.     Antonia Chaney CNP  [facility]  O: [phone]  P: [phone]

## 2022-07-27 NOTE — PLAN OF CARE
Patient is very weak and lethargic. She arouses with voice.  Vital signs are WNL. Patient reports headache.  Lungs sounds diminished. Patient is on room air with oxygen saturations in the 95%. Appetite is poor. Bowel sounds present-had stools today.  Patient has narayan cathter-patent and draining. Skin is intact. Patient reposition. Patient treated with IV Vancomycin and rocephin.  MRI of brain done today and MD updated with results. Monitor.   Problem: Plan of Care - These are the overarching goals to be used throughout the patient stay.    Goal: Optimal Comfort and Wellbeing  Outcome: Ongoing, Not Progressing  Problem: Fluid Imbalance (Pneumonia)  Goal: Fluid Balance  Outcome: Ongoing, Progressing   Problem: Behavioral Health Comorbidity  Goal: Maintenance of Behavioral Health Symptom Control  Outcome: Ongoing, Progressing   Problem: Behavioral Health Comorbidity  Goal: Maintenance of Behavioral Health Symptom Control  Intervention: Maintain Behavioral Health Symptom Control

## 2022-07-27 NOTE — PROGRESS NOTES
[facility] ACUTE PAIN SERVICE    ([facility], [facility], [facility])  Daily PAIN Progress Note    Assessment/Plan:  Julisa Chaney is a 77 year old female who was admitted on 7/19/2022.  Pain team was asked to see the patient for Encephalopathy, polypharmacy for trigeminal neuralgia, question taper T3, nortriptyline. Admitted for diarrhea, fevers, generalized weakness, lightheadedness, fall, confirmed COVID-19, viral pneumonia s/t to COVID-19, severe sepsis due to pneumonia.   Is being treated with antibiotics.  She continues to have altered mental status/encephalopathy throughout her hospital stay. History of trigeminal neuralgia, chronic pain, migraines, hyperlipidemia, hypercalcemia, iron deficiency anemia, depression, pyloric ulcer. Describes pain as 0/10. The patient does not smoke and denies chemical dependency history.      Patient on Topamax, Trileptal, Nortriptyline, Keppra.  The first 3 medications are for trigeminal neuralgia.  Keppra was added in February 2022 for asterixis per Neurology (in February 2022 she was admitted with fever, generalized weakness, she was found to to have pneumonia. She was noted to have weakness, tremors, asterixis). Also prescribed APAP #3 per PCP for  trigeminal neuralgia. Velfaxine also started in 2/2022 - otherwise no new changes to home meds per rx history.      Pt non responsive to questions, sitting up, food tray in front of her, aide in room, asked squeeze hand, nodd, or blink if has pain, unable to do any command. No changes for now. Awaiting MRI. OF note home oxcarbamazepine (trileptal) is an [id number] inducer, therefore metabolism of drugs via this pathway will get metabolized faster.     Not receiving sedating mediations to cause this presentation.        PLAN:   1) Pain is consistent with history of trigeminal neuralgia.  Head CT shows new vasogenic edema in the left frontal lobe with possible small mass lesion. MRI brain with and without contrast  to better clarify this lesion recommended per Neurology. EEG to evaluate for epilepsy per Neurology. Treatment plan includes multimodal pain approach, Hospital Medicine Service for medical management, Neurology consult to eval altered mental status. APAP# 3 stopped and Nortriptyline on hold per primary service. Encephalopathy likely in the setting of COVID, sepsis, pneumonia, prolonged hospitalization. Medications could contribute but there are some other factors here. Also small lesion noted on CT. Appreciate Neurology consult.   2)Multimodal Medication Therapy  Topical: lidocaine patch daily   NSAID'S: none, COVID, sepsis, ORTIZ  Muscle Relaxants: none  Adjuvants: APAP 650 mg q6h prn - not scheduled to not mask worsening infection, keppra 1g po BID - increased from home keppra 500 mg po BID, oxcarbamazepine (trileptal) 450 mg po qhs  Antidepressants/anxiolytics: Seroquel 12.5 mg q6h prn, lorazepam prior to MRI, Cogentin 1 mg tid prn, topamax 50 mg at bedtime, nortriptyline on hold  Opioids: none  IV Pain medication: none   3)Non-medication interventions: rest   4)Constipation Prophylaxis: Scheduled senna  5) Care Teams: [facility], neurology      -Opioid prescriber has been Mara Murillo - PCP  -[place]  pulled from system on 7/26/22. This indicates ongoing fills for APAP#3  6/23/22 APAP#3 #120 for 30 days   Discharge Recommendations - We recommend prescribing the following at the time of discharge: RUDY Segovia, PharmD, BCPS, CPE  Acute Care Pain Management Program   [facility] ([facility], Joes, JNs)   Page via [facility]- Click HERE to page Gena or call [phone]

## 2022-07-27 NOTE — PROGRESS NOTES
Patient's Daughter Alissa Del Real updated and informed that patient will be transferred to [facility].

## 2022-07-27 NOTE — PROGRESS NOTES
NEUROLOGY PROGRESS NOTE     Julisa Chaney,  1945, MRN [id number] Date: 2022     [facility]   Code status:  Full Code   PCP: Mara Murillo, [phone]      ASSESSMENT & PLAN   Diagnosis code: Encephalopathy    Encephalopathy  Recent COVID-19 viral pneumonia  Recent severe sepsis and acute kidney injury  History of trigeminal neuralgia  Brain mass/abnormal head CT  Question of cerebral abscess versus ventriculitis      Head CT shows new vasogenic edema in the left frontal lobe with possible small mass lesion    MRI brain suggestive of ventriculitis/cerebral abscess.  MRI brain done without contrast.    EEG negative for ongoing seizures though does suggest decreased seizure threshold    Keppra level subtherapeutic.  Continue Keppra.     Trileptal, Topamax, nortriptyline could be held or continued depending on patient's pain control-would defer to primary team    UA negative on admission.  B12/TSH noncontributory    Ammonia normal.    PT/OT if needed    Neurosurgery consulted for ventriculitis/cerebral abscess.  They are recommending transfer to a tertiary hospital with more neurosurgical coverage.    Sundowning precautions/supportive care    Patient to be transferred urgently to a tertiary hospital.  We will sign off.        Chief Complaint   Patient presents with     Nausea, Vomiting, & Diarrhea        HISTORY OF PRESENT ILLNESS     We have been requested by Dr. Gagnon to evaluate Julisa Chaney who is a 77 year old  female for altered mental status.  Is a 77-year-old female admitted about a week ago for evaluation of diarrhea fevers generalized weakness lightheadedness and fall.  She was diagnosed to have community-acquired pneumonia secondary to COVID-19 infection.  She was also diagnosed with severe sepsis.  Is being treated with antibiotics.  She continues to have altered mental status/encephalopathy throughout her hospital stay.  She has not significantly improved until today for which  neurology was consulted.  She also has acute kidney injury which resolved with intraventricular fluid.  She did have poor oral intake prior to admission.  In February 2022 when she was admitted she did have some asterixis but no clear cause could be identified.    Patient underwent a head CT today which showed a left frontal lesion with vasogenic edema. MRI was recommended though the patient is refusing the MRI possibly because of claustrophobia.  Would consider steroids for the vasogenic edema depending on MRI results.    Patient is also on Topamax, Trileptal, nortriptyline, Keppra.  The first 3 medications are for her trigeminal neuralgia.  Keppra was added in February for asterixis.  Levels have been subtherapeutic.  No seizure-like activity noted.    7/26  Patient unable to do MRI.  Potentially will need anesthesia involved.  EEG completed today.  No seizure-like activity noted.  Patient's encephalopathy slightly better.    7/27  MRI planned for later today.  MRI done.  Discussed with radiologist.  She did not do the MRI with contrast and only did the noncontrast portion.  There is question for ventriculitis/abscess.  Discussed with neurosurgery and they recommend transferring the patient to a tertiary level hospital.  An LP was also recommended by radiology and discussed with ID about doing the LP.  Discussed with nursing staff and why the MRI was not done completely.     PAST MEDICAL & SURGICAL HISTORY     Medical History  Past Medical History:   Diagnosis Date     High cholesterol      Migraines      Sepsis (H) 8/10/2020     Surgical History  Past Surgical History:   Procedure Laterality Date     HYSTERECTOMY       PICC TRIPLE LUMEN PLACEMENT  7/22/2022          NM ESOPHAGOGASTRODUODENOSCOPY TRANSORAL DIAGNOSTIC N/A 8/13/2020    Procedure: ESOPHAGOGASTRODUODENOSCOPY (EGD);  Surgeon: Nathan Smalls MD;  [place] [facility]  Service: [place]astroenterology     NM ESOPHAGOGASTRODUODENOSCOPY TRANSORAL  DIAGNOSTIC N/A 2020    Procedure: ESOPHAGOGASTRODUODENOSCOPY (EGD), PYLORIC DILATION;  Surgeon: Nathan Smalls MD;  Location: [facility];  Service: Gastroenterology     [facility] COLONOSCOPY W/WO BRUSH/WASH N/A 2020    Procedure: COLONOSCOPY WITH POLYPECTOMY;  Surgeon: Nathan Smalls MD;  Location: [facility];  Service: Gastroenterology        SOCIAL HISTORY     Social History     Tobacco Use     Smoking status: Former Smoker     Packs/day: 1.00     Years: 50.00     Pack years: 50.00     Quit date: 2014     Years since quittin.6     Smokeless tobacco: Never Used   Substance Use Topics     Alcohol use: No     Drug use: No        FAMILY HISTORY     Reviewed, and family history includes Breast Cancer in her maternal aunt, mother, sister, and sister; Cancer in her father; Testicular cancer (age of onset: 17.00) in her grandchild.     ALLERGIES     Allergies   Allergen Reactions     Contrast [Iohexol] Rash     Noticed during 2020 admission. Received IV contrast on      Chocolate Headache     Penicillins Rash        REVIEW OF SYSTEMS     12 system ROS was done other than the HPI this was negative.  Pertinent positives noted in the HPI.     HOME & HOSPITAL MEDICATIONS     Prior to Admission Medications  Medications Prior to Admission   Medication Sig Dispense Refill Last Dose     acetaminophen-codeine (TYLENOL #3) 300-30 MG tablet TAKE 1 TABLET BY MOUTH EVERY 6 HOURS AS NEEDED FOR PAIN 120 tablet 0 Past Week     ARTIFICIAL TEARS 1.4 % ophthalmic solution INSTILL 2 DROPS IN BOTH EYES AS NEEDED 15 mL 0      cholecalciferol, vitamin D3, 50 mcg (2,000 unit) Tab [CHOLECALCIFEROL, VITAMIN D3, 50 MCG (2,000 UNIT) TAB] Take 1 tablet (2,000 Units total) by mouth daily. One tab daily 90 tablet 4 Unknown     desonide (DESOWEN) 0.05 % cream [DESONIDE (DESOWEN) 0.05 % CREAM] Apply to affected area 2 times daily (Patient taking differently: Apply topically 2 times daily as needed) 60 g 1       ferrous sulfate 325 (65 FE) MG tablet [FERROUS SULFATE 325 (65 FE) MG TABLET] Take 1 tablet (325 mg total) by mouth daily with breakfast. 30 tablet 0 Unknown at Unknown time     levETIRAcetam (KEPPRA) 500 MG tablet Take 1 tablet (500 mg) by mouth 2 times daily 180 tablet 0 Unknown     multivitamin (DAILY-DAVID) per tablet [MULTIVITAMIN (DAILY-DAVID) PER TABLET] Take 1 tablet by mouth daily. 100 tablet 3 Unknown     nortriptyline (PAMELOR) 25 MG capsule Take 50 mg by mouth 2 times daily   Past Week     omeprazole (PRILOSEC) 40 MG DR capsule Take 1 capsule (40 mg) by mouth daily 90 capsule 3 Past Week     OXcarbazepine (TRILEPTAL) 150 MG tablet TAKE 3 TABLETS(450 MG) BY MOUTH AT BEDTIME 270 tablet 2 Past Week at Unknown time     senna-docusate (SENOKOT-S/PERICOLACE) 8.6-50 MG tablet Take 1 tablet by mouth At Bedtime   Unknown     topiramate (TOPAMAX) 50 MG tablet Take 1 tablet (50 mg) by mouth At Bedtime 90 tablet 4 Past Week     mirtazapine (REMERON) 15 MG tablet Take 1 tablet (15 mg) by mouth At Bedtime 30 tablet 1      nortriptyline (PAMELOR) 50 MG capsule Take 1 capsule (50 mg) by mouth 2 times daily (Patient not taking: Reported on 7/20/2022) 180 capsule 1 Not Taking at Unknown time       Hospital Medications    ceFEPIme (MAXIPIME) IV  2 g Intravenous Q8H     enoxaparin ANTICOAGULANT  40 mg Subcutaneous Q24H     ipratropium-albuterol  2 puff Inhalation 4x Daily     levETIRAcetam  1,000 mg Oral BID     lidocaine  1 patch Transdermal Q24H    And     lidocaine   Transdermal Q8H MARCIA     metroNIDAZOLE  500 mg Oral BID     [Held by provider] nortriptyline  50 mg Oral BID     OXcarbazepine  450 mg Oral At Bedtime     pantoprazole  40 mg Oral BID AC     sennosides  8.6 mg Oral BID     sodium chloride (PF)  3 mL Intracatheter Q8H     topiramate  50 mg Oral At Bedtime     vancomycin  1,000 mg Intravenous Q12H        PHYSICAL EXAM     Vital signs  Temp:  [97.2  F (36.2  C)-100.3  F (37.9  C)] 97.8  F (36.6  C)  Pulse:   "[] 100  Resp:  [16-20] 16  BP: (106-138)/(53-63) 132/63  SpO2:  [97 %-100 %] 100 %    PHYSICAL EXAMINATION  VITALS: /63 (BP Location: Left arm)   Pulse 100   Temp 97.8  F (36.6  C) (Axillary)   Resp 16   Ht 1.651 m (5' 5\")   Wt 52.3 kg (115 lb 3.2 oz)   SpO2 100%   BMI 19.17 kg/m    GENERAL -Health appearing, No apparent distress  EYES- No scleral icterus, no eyelid droop, Pupils - see Neuro section  HEENT - Normocephalic, atraumatic, Hearing grossly intact; Oral mucosa moist and pink in color. External Ears and nose intact.   Neck - supple with no obvious lymphadenopathy or thyromegaly  PULM - Good spontaneous respiratory effort; Normal palpation of chest.   CV- Pedal pulses present with no peripheral edema/ No significant varicosities.  MSK- Gait - see Neuro section; Strength and tone- see Neuro section; Range of motion grossly intact.  PSYCH -cooperative    Neurological  Mental status - Patient is somnolent and does not answer questions. Attention span is slightly decreased. Language is fluent though limited to few phrases.  Follows some very simple commands.  Cranial nerves - CN II-XII intact. Pupils are reactive and symmetric; EOMI, VFIT, NLF symmetric  Motor - There is no pronator drift. Motor exam shows 5/5 strength grossly in all extremities.  Tone - Tone is symmetric bilaterally in upper and lower extremities.  Sensation - Sensory exam is grossly intact to light touch, pain.  Coordination - Finger to nose is without dysmetria.  Unable to follow commands for heel-to-shin.  Gait and station --unable to safely ambulate due to altered mental status.  Formal gait testing cannot be done due to safety concerns from ongoing medical issues.  Patient slightly more somnolent today.  Was sleeping when I examined her.  Less interactive.     DIAGNOSTIC STUDIES     Pertinent Radiology   CT head  IMPRESSION:  1.  New vasogenic edema anterior left frontal lobe with possible small mass lesion.      2.  " Recommend MRI brain without and with contrast.     3.  Diffuse age related changes along with encephalomalacia cerebellar hemispheres bilaterally.     4.  No evidence of a midline shift, hemorrhage or focal area suggestive of infarct by CT.      These findings were communicated by phone to Dr. Gagnon at 5:55 PM on 07/25/2025.    EEG  IMPRESSION/REPORT/PLAN  This is a normal EEG during wakefulness and drowsiness except for mild generalized background dysrhythmia. EEG is not suggestive of ongoing seizures or encephalopathy. Further clinical correlation is needed.     Please note that the absence of epileptiform abnormalities does not exclude the possibility of epilepsy in any patient.     2/8/22  IMPRESSION:  1.  No evidence of acute large territorial infarction, acute intracranial hemorrhage, or mass lesion.  2.  Stable chronic bilateral cerebellar infarcts.  3.  Stable mild chronic small vessel ischemic change and mild diffuse brain parenchymal volume loss.    Component      Latest Ref Rng & Units 7/22/2022 7/24/2022 7/25/2022   Bilirubin Total      0.0 - 1.0 mg/dL 0.3     Bilirubin Direct      <=0.5 mg/dL 0.2     Protein Total      6.0 - 8.0 g/dL 6.5     Albumin      3.5 - 5.0 g/dL 2.4 (L)     Alkaline Phosphatase      45 - 120 U/L 86     AST      0 - 40 U/L 30     ALT      0 - 45 U/L 31     Creatinine      0.60 - 1.10 mg/dL  0.56 (L)    GFR Estimate      >60 mL/min/1.73m2  >90    Keppra (Levetiracetam) Level      10.0 - 40.0  g/mL 8.7 (L)     Topiramate Level      5.0 - 20.0 ug/mL 2.0 (L)     Oxcarb or Eslicarb Metabolite (MHD)      3 - 35 ug/mL 13     Magnesium      1.8 - 2.6 mg/dL   1.6 (L)   Vitamin B12      232 - 1,245 pg/mL   1,536 (H)   TSH      0.30 - 5.00 uIU/mL   0.56   CK Total      30 - 190 U/L   14 (L)   Ammonia      11 - 35 umol/L   30     Component      Latest Ref Rng & Units 7/26/2022   Keppra (Levetiracetam) Level      10.0 - 40.0  g/mL 6.0 (L)     EEG    IMPRESSION/REPORT/PLAN  This is an  abnormal EEG during wakefulness and drowsiness due to generalized slowing and more severe left frontotemporal region slowing with intermittent sharp wave activity.  EEG is suggestive of left frontotemporal cerebral dysfunction and epilepsy.  EEG is also suggestive of an ongoing encephalopathy.  Further clinical correlation is needed.     Please note that the absence of epileptiform abnormalities does not exclude the possibility of epilepsy in any patient.     MRI brain  IMPRESSION:  1.  Focal diffusion restriction centered at the left frontal horn with surrounding vasogenic edema. In the setting of sepsis, this raises concern for cerebral abscess. Alternatively, neoplastic process would be a consideration.  2.  Focal areas of diffusion restriction within the occipital horns, concerning for pus/ventriculitis. Alternatively this could represent hemorrhage.  3.  Mild asymmetric enlargement of the left lateral ventricle with material at the foramen Monro, consistent with mild hydrocephalus.  4.  Consider spinal fluid sampling. Postcontrast imaging including susceptibility weighted imaging would be helpful for further evaluation.     Recent Results (from the past 24 hour(s))   CBC with platelets    Collection Time: 07/27/22  6:44 AM   Result Value Ref Range    WBC Count 9.9 4.0 - 11.0 10e3/uL    RBC Count 4.71 3.80 - 5.20 10e6/uL    Hemoglobin 9.8 (L) 11.7 - 15.7 g/dL    Hematocrit 33.9 (L) 35.0 - 47.0 %    MCV 72 (L) 78 - 100 fL    MCH 20.8 (L) 26.5 - 33.0 pg    MCHC 28.9 (L) 31.5 - 36.5 g/dL    RDW 18.6 (H) 10.0 - 15.0 %    Platelet Count 549 (H) 150 - 450 10e3/uL   CRP inflammation    Collection Time: 07/27/22  6:44 AM   Result Value Ref Range    CRP 5.7 (H) 0.0 - <0.8 mg/dL   Basic metabolic panel    Collection Time: 07/27/22  6:44 AM   Result Value Ref Range    Sodium 131 (L) 136 - 145 mmol/L    Potassium 3.5 3.5 - 5.0 mmol/L    Chloride 99 98 - 107 mmol/L    Carbon Dioxide (CO2) 23 22 - 31 mmol/L    Anion Gap 9 5 -  18 mmol/L    Urea Nitrogen 11 8 - 28 mg/dL    Creatinine 0.54 (L) 0.60 - 1.10 mg/dL    Calcium 8.3 (L) 8.5 - 10.5 mg/dL    Glucose 110 70 - 125 mg/dL    GFR Estimate >90 >60 mL/min/1.73m2   Ionized Calcium    Collection Time: 07/27/22  6:44 AM   Result Value Ref Range    Calcium, Ionized pH 7.4 1.08 (L) 1.11 - 1.30 mmol/L    pH 7.45 7.35 - 7.45    Calcium, Ionized Measured 1.05 (L) 1.11 - 1.30 mmol/L   Magnesium    Collection Time: 07/27/22  6:44 AM   Result Value Ref Range    Magnesium 2.0 1.8 - 2.6 mg/dL   Hepatic function panel    Collection Time: 07/27/22  6:44 AM   Result Value Ref Range    Bilirubin Total 0.3 0.0 - 1.0 mg/dL    Bilirubin Direct 0.1 <=0.5 mg/dL    Protein Total 6.5 6.0 - 8.0 g/dL    Albumin 2.6 (L) 3.5 - 5.0 g/dL    Alkaline Phosphatase 89 45 - 120 U/L    AST 12 0 - 40 U/L    ALT 18 0 - 45 U/L       Total time spent for face to face visit, reviewing labs/imaging studies, counseling and coordination of care was: Over 35 min More than 50% of this time was spent on counseling and coordination of care.    Discussing MRI results with the radiologist.  Discussing results with the neurosurgical.  Coordination of care with the nursing staff.  Discussing plan with the nursing staff.  Discussing lumbar puncture with ID.    Jamie Clarke MD  Neurologist  [facility] Neurology [facility]  Tel:- [phone]

## 2022-07-27 NOTE — DISCHARGE SUMMARY
"[facility]  Hospitalist Discharge Summary      Date of Admission:  7/19/2022  Date of Discharge:  7/27/2022  Discharging Provider: Gemini Montenegro MD  Discharge Service: Hospitalist Service    Discharge Diagnoses    Meningitis  Encephalitis  ? Brain abscss      Follow-ups Needed After Discharge   Follow-up Appointments     Follow Up and recommended labs and tests      With neurosurgery team at [facility]         {Additional follow-up instructions/to-do's for PCP    :with neurosurgery at [facility]     Unresulted Labs Ordered in the Past 30 Days of this Admission     No orders found from 6/19/2022 to 7/20/2022.      These results will be followed up by [facility] team     Discharge Disposition   { :[id number]::\"Discharged to [facility] for higher care   Condition at discharge: {CONDITION:[id number]::guarded       Hospital Course          mri without contrast ,showed gin vasogenic edema and increased enhancement ,will d/w neurology once report is available                       Julisa Chaney is a 77 year old female admitted on 7/19/2022. She came to the ED for evaluation of diarrhea, fevers, generalized weakness, lightheadedness, fall.     # Confirmed COVID-19 infection    # Viral Pneumonia secondary to COVID-19 infection  # Bacterial Superinfection  #Severe sepsis due to Pneumonia  # Healthcare associated pneumonia versus aspiration pneumonitis     Symptom Onset 7/15/2022   Date of 1st Positive Test 7/19/2022   Vaccination Status Fully Vaccinated         - COVID-19 special precautions, continuous pulse-ox  - Oxygen: continue current support with O2 Device: None (Room air) at  ; titrate to keep SpO2 between 90-96%  - Labs: Standard COVID admission labs ordered (CBC with diff, CMP, INR, D-dimer, CRP).  Repeat lactic acid and trend.  Follow-up blood cultures.  - Imaging: no additional imaging needed at this time  - Breathing treatments: Combivent inhaler four times " a day and PRN; avoid nebulizers in favor of MDIs   - IV fluids: continuous at a rate of 50 mL/hr; hydrate cautiously to avoid worsening respiratory status with volume overload.  - Antibiotics: indicated due to concern of bacterial superinfection; Meropenem and vancomycin ordered   - COVID-Focused Medications: Remdesivir x 5 days or until hospital discharge, started on 7/19/2022  - DVT Prophylaxis:         - At high risk of thrombotic complications due to COVID-19 (DDimer = N/A )         - PROPHYLACTIC dosing: lovenox 40mg daily     #Diarrhea  CT scan showed no acute abnormality in the abdomen or pelvis  Likely related to COVID infection  Given recent antibiotics we will check stool work-up and C. difficile    Julisa Chaney is a 77 year old female admitted on 7/19/2022. She came to the ED for evaluation of diarrhea, fevers, generalized weakness, lightheadedness, fall.     # Confirmed COVID-19 infection    # Viral Pneumonia secondary to COVID-19 infection  # Bacterial Superinfection  #Severe sepsis due to Pneumonia  # Healthcare associated pneumonia versus aspiration pneumonitis     Symptom Onset 7/15/2022   Date of 1st Positive Test 7/19/2022   Vaccination Status Fully Vaccinated         - COVID-19 special precautions, continuous pulse-ox  - Oxygen: continue current support with O2 Device: None (Room air) at  ; titrate to keep SpO2 between 90-96%  - Labs: Standard COVID admission labs ordered (CBC with diff, CMP, INR, D-dimer, CRP).  Repeat lactic acid and trend.  Follow-up blood cultures.  - Imaging: no additional imaging needed at this time  - Breathing treatments: Combivent inhaler four times a day and PRN; avoid nebulizers in favor of MDIs   - IV fluids: continuous at a rate of 50 mL/hr; hydrate cautiously to avoid worsening respiratory status with volume overload.  - Antibiotics: indicated due to concern of bacterial superinfection; Meropenem and vancomycin ordered   - COVID-Focused Medications: Remdesivir x 5  days or until hospital discharge, started on 7/19/2022  - DVT Prophylaxis:         - At high risk of thrombotic complications due to COVID-19 (DDimer = N/A )         - PROPHYLACTIC dosing: lovenox 40mg daily     #Diarrhea  CT scan showed no acute abnormality in the abdomen or pelvis  Likely related to COVID infection  Given recent antibiotics we will check stool work-up and C. difficile    Pt was admitted with encephalitis ,pneyuomina due to aspiration ,covid ,her underling disease is trigeminal neuralgia     She deteriorated despite seizure meds and antibiotics    Mri today suggested brain abscess ,increase enhancement     neurosurgery consulted and they recommended transfer to [facility] neurosurgery center at [facility] and was acepted        Consultations This Hospital Stay   PHARMACY TO DOSE VANCO  SPEECH LANGUAGE PATH ADULT IP CONSULT  PHARMACY IP CONSULT  CARE MANAGEMENT / SOCIAL WORK IP CONSULT  VASCULAR ACCESS ADULT IP CONSULT  INFECTIOUS DISEASES IP CONSULT  PHYSICAL THERAPY ADULT IP CONSULT  OCCUPATIONAL THERAPY ADULT IP CONSULT  CARE MANAGEMENT / SOCIAL WORK IP CONSULT  PHYSICAL THERAPY ADULT IP CONSULT  OCCUPATIONAL THERAPY ADULT IP CONSULT  PHYSICAL THERAPY ADULT IP CONSULT  OCCUPATIONAL THERAPY ADULT IP CONSULT  NEUROLOGY IP CONSULT  PAIN MANAGEMENT ADULT IP CONSULT  NEPHROLOGY IP CONSULT  NEUROSURGERY IP CONSULT  INFECTIOUS DISEASES IP CONSULT    Code Status   Full Code    Time Spent on this Encounter   I, Gemini Montenegro MD, personally saw the patient today and spent greater than 30 minutes discharging this patient.       Gemini Montenegro MD  [address] [address]  Phone: [phone]  Fax: [phone]  ______________________________________________________________________    Physical Exam   Vital Signs: Temp: 97.8  F (36.6  C) Temp src: Axillary BP: 132/63 Pulse: 100   Resp: 16 SpO2: 100 % O2 Device: None (Room air)    Weight: 115 lbs 3.2  oz  General Appearance:alert, confused , disoriented   Respiratory: crackles at bases   Cardiovascular: RRR  GI: soft, no masses  ,   Skin: worm ,dry no rashes   Other: *n cv angle or back tenderness       Primary Care Physician   Mara Murillo    Discharge Orders      General info for SNF    Length of Stay Estimate: Short Term Care: Estimated # of Days <30  Condition at Discharge: Declining  Level of care:board and care  Rehabilitation Potential: Poor  Admission H&P remains valid and up-to-date: Yes  Recent Chemotherapy: N/A  Use Nursing Home Standing Orders: Yes     Follow Up and recommended labs and tests    With neurosurgery team at [facility]     Reason for your hospital stay    Admitted with encephalitis ,mri suggest abscess/?worsening infecion need lp and ? Biopsy and drain depending o results     Activity - Up with nursing assistance     Droplet Isolation     Fall precautions     Seizure precautions     Diet    Follow this diet upon discharge: Orders Placed This Encounter      Snacks/Supplements Adult: Ensure Enlive; Between Meals      Snacks/Supplements Adult: Magic Cup; With Meals      Minced & Moist Diet (level 5) Mildly Thick (level 2)       Significant Results and Procedures     Discharge Medications   Current Discharge Medication List      START taking these medications    Details   ceFEPIme (MAXIPIME) 2 G vial Inject 2 g into the vein every 8 hours  Qty: 20 mL, Refills: 0    Associated Diagnoses: Pyogenic brain abscess      metroNIDAZOLE (FLAGYL) 500 MG tablet Take 1 tablet (500 mg) by mouth 2 times daily  Qty: 20 tablet, Refills: 0    Associated Diagnoses: Pyogenic brain abscess      ondansetron (ZOFRAN) 2 MG/ML SOLN injection Inject 2 mLs (4 mg) into the vein every 6 hours as needed for nausea or vomiting  Qty: 16 mL, Refills: 0    Associated Diagnoses: Pyogenic brain abscess      QUEtiapine (SEROQUEL) 25 MG tablet Take 0.5 tablets (12.5 mg) by mouth every 6 hours as needed (Agitation)  Qty:  30 tablet, Refills: 0    Associated Diagnoses: Pyogenic brain abscess      vancomycin (VANCOCIN) 1000 mg in dextrose 5% 200 mL PREMIX Inject 200 mLs (1,000 mg) into the vein every 12 hours  Qty: 30 mL, Refills: 0    Associated Diagnoses: Pyogenic brain abscess         CONTINUE these medications which have CHANGED    Details   !! levETIRAcetam (KEPPRA) 1000 MG tablet Take 1 tablet (1,000 mg) by mouth 2 times daily  Qty: 60 tablet, Refills: 0    Associated Diagnoses: Pyogenic brain abscess; Encephalitis      !! OXcarbazepine (TRILEPTAL) 150 MG tablet Take 3 tablets (450 mg) by mouth At Bedtime  Qty: 60 tablet, Refills: 0    Associated Diagnoses: Pyogenic brain abscess      !! polyvinyl alcohol (ARTIFICIAL TEARS) 1.4 % ophthalmic solution Place 1 drop into both eyes every hour as needed for dry eyes  Qty: 60 mL, Refills: 0    Associated Diagnoses: Lung infiltrate; Infection due to 2019 novel coronavirus; Sepsis, due to unspecified organism, unspecified whether acute organ dysfunction present (H); Pyogenic brain abscess; Encephalitis; ORTIZ (acute kidney injury) (H); Pyloric ulcer, chronic; Current mild episode of major depressive disorder without prior episode (H); Abnormal chest CT; Chronic pain syndrome; Hypercalcemia; Disorder of bone and cartilage; Pure hypercholesterolemia; Other migraine without status migrainosus, not intractable; Trigeminal neuralgia; Counseling regarding advanced directives and goals of care; Controlled substance agreement signed       !! - Potential duplicate medications found. Please discuss with provider.      CONTINUE these medications which have NOT CHANGED    Details   acetaminophen-codeine (TYLENOL #3) 300-30 MG tablet TAKE 1 TABLET BY MOUTH EVERY 6 HOURS AS NEEDED FOR PAIN  Qty: 120 tablet, Refills: 0    Associated Diagnoses: Trigeminal nerve disorder      !! ARTIFICIAL TEARS 1.4 % ophthalmic solution INSTILL 2 DROPS IN BOTH EYES AS NEEDED  Qty: 15 mL, Refills: 0    Associated Diagnoses:  Dry eyes      cholecalciferol, vitamin D3, 50 mcg (2,000 unit) Tab [CHOLECALCIFEROL, VITAMIN D3, 50 MCG (2,000 UNIT) TAB] Take 1 tablet (2,000 Units total) by mouth daily. One tab daily  Qty: 90 tablet, Refills: 4    Associated Diagnoses: Vitamin D deficiency      desonide (DESOWEN) 0.05 % cream [DESONIDE (DESOWEN) 0.05 % CREAM] Apply to affected area 2 times daily  Qty: 60 g, Refills: 1    Associated Diagnoses: Atopic dermatitis, mild      ferrous sulfate 325 (65 FE) MG tablet [FERROUS SULFATE 325 (65 FE) MG TABLET] Take 1 tablet (325 mg total) by mouth daily with breakfast.  Qty: 30 tablet, Refills: 0    Associated Diagnoses: Iron deficiency anemia due to chronic blood loss      !! levETIRAcetam (KEPPRA) 500 MG tablet Take 1 tablet (500 mg) by mouth 2 times daily  Qty: 180 tablet, Refills: 0    Associated Diagnoses: Asterixis      multivitamin (DAILY-DAVID) per tablet [MULTIVITAMIN (DAILY-DAVID) PER TABLET] Take 1 tablet by mouth daily.  Qty: 100 tablet, Refills: 3    Associated Diagnoses: Trigeminal nerve disorder      nortriptyline (PAMELOR) 25 MG capsule Take 50 mg by mouth 2 times daily      omeprazole (PRILOSEC) 40 MG DR capsule Take 1 capsule (40 mg) by mouth daily  Qty: 90 capsule, Refills: 3    Associated Diagnoses: Gastroesophageal reflux disease without esophagitis      !! OXcarbazepine (TRILEPTAL) 150 MG tablet TAKE 3 TABLETS(450 MG) BY MOUTH AT BEDTIME  Qty: 270 tablet, Refills: 2    Associated Diagnoses: Facial neuralgia      senna-docusate (SENOKOT-S/PERICOLACE) 8.6-50 MG tablet Take 1 tablet by mouth At Bedtime    Associated Diagnoses: Constipation, unspecified constipation type      topiramate (TOPAMAX) 50 MG tablet Take 1 tablet (50 mg) by mouth At Bedtime  Qty: 90 tablet, Refills: 4    Associated Diagnoses: Intractable chronic migraine without aura and without status migrainosus      mirtazapine (REMERON) 15 MG tablet Take 1 tablet (15 mg) by mouth At Bedtime  Qty: 30 tablet, Refills: 1     Associated Diagnoses: Current mild episode of major depressive disorder without prior episode (H); Persistent insomnia       !! - Potential duplicate medications found. Please discuss with provider.        Allergies   Allergies   Allergen Reactions     Contrast [Iohexol] Rash     Noticed during 08/2020 admission. Received IV contrast on 8/5     Chocolate Headache     Penicillins Rash

## 2022-07-27 NOTE — PROGRESS NOTES
[facility] Infectious Disease  MRI noted  ? Abscess  Neurosurgery advises against LP  Recommend transfer they discussed with hospitalist    Faiza Chandler M.D.

## 2022-07-27 NOTE — CONSULTS
RENAL CONSULTATION:     Date of Consultation:  7/27/2022    Requesting Physician: Dr. Gagnon  Reason for Consult:  Hyponatremia    Assessment/ Recommendations:  Hyponatremia: (IMPROVING)  Presented 7/19 with encephalopathy but normal Na 136 mmol/L. She was given LR IV 7/21-7/23 (Na 130 mEq/L for LR) and subsequently had urine studies 7/26 given Na+ 129 showing high [place] and UOsm 468. Concern for left frontal lobe mass so certainly SAIDH possible but difficult to interpret urine studies following previous IVF   - Limit free water intake to 1.5 L/kim/   - If Na+ drops again, consider adding NaCl 1 g po BID + lasix 10 mg daily.    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    -If neurosurgery consulted,   they may pursue hypertonic saline treatment to minimize cerebral edema around this mass which certainly will affect her sodium levels.    Microcytic anemia: Hgb stable. On ferrous sulfate PTA - consider assessing adequacy of iron stores with this replacement.      This document is created with the help of Dragon dictation system. All grammatical errors are unintentional.     Prince Masterson MD  [facility]  [phone] (off)       History of present illness: Julisa is a 77-year-old woman with history of trigeminal neuralgia with chronic pain, migraine headaches, hyperlipidemia who was admitted to the hospital 7/19/2022 for a 4-day history of diarrhea, fevers, generalized weakness, lightheadedness and a fall at home.  Upon initial presentation she tested positive for COVID-19 and was subsequently admitted.  Started on remdesivir x5 days however did not require dexamethasone with normal oxygen levels.  Sodium 136 at time of admission on 7/19/2022.  Her sodium level subsequently increased and she was subsequently started on lactated Ringer's 100 mL/h 7/21/2022-7/23/2022.  Her  serum sodium levels eventually dropped to 129 on 7/26/2022 at which time urine osmolality was found to be 468 with urine sodium 124 mmol/L.  We were asked to assist with evaluation of this.  Overnight the patient remains encephalopathic and is unable to answer any questions for me this afternoon.  Her sodium has improved to 131 mmol/L today without further IV fluids.  CT scan during this admission showed possible mass in the left frontal lobe which is new he has MRI pending today.  Neurology evaluating.  No review of systems obtainable at this time focal history obtained from the written record.    Past Medical History:   Diagnosis Date     High cholesterol      Migraines      Sepsis (H) 8/10/2020         Current Facility-Administered Medications:      [COMPLETED] remdesivir 100 mg in sodium chloride 0.9 % 250 mL intermittent infusion, 100 mg, Intravenous, Q24H, Last Rate: 500 mL/hr at 07/26/22 2036, 100 mg at 07/26/22 2036 **AND** 0.9% sodium chloride BOLUS, 50 mL, Intravenous, Q24H, Faiza Chandler MD, Last Rate: 100 mL/hr at 07/26/22 1515, 50 mL at 07/26/22 1515     acetaminophen (TYLENOL) tablet 650 mg, 650 mg, Oral, Q6H PRN, 650 mg at 07/27/22 0846 **OR** acetaminophen (TYLENOL) Suppository 650 mg, 650 mg, Rectal, Q6H PRN, Nirmal Hagen MD     benztropine (COGENTIN) tablet 1 mg, 1 mg, Oral, TID PRN, Ashwini Dukes DO     ceFEPIme (MAXIPIME) 2 g vial to attach to  ml bag for ADULTS or 50 ml bag for PEDS, 2 g, Intravenous, Q12H, Faiza Chandler MD, 2 g at 07/27/22 0121     desonide (DESOWEN) 0.05 % cream, , Topical, BID PRN, Ashwini Dukes DO     enoxaparin ANTICOAGULANT (LOVENOX) injection 40 mg, 40 mg, Subcutaneous, Q24H, Nirmal Hagen MD, 40 mg at 07/27/22 0700     guaiFENesin (ROBITUSSIN) 20 mg/mL solution 10 mL, 10 mL, Oral, Q4H PRN, Nirmal Hagen MD     hydrALAZINE (APRESOLINE) injection 10 mg, 10 mg, Intravenous, Q6H PRN, Ashwini Dukse DO      ipratropium-albuterol (COMBIVENT RESPIMAT) inhaler 2 puff, 2 puff, Inhalation, 4x Daily, Nirmal Hagen MD, 2 puff at 07/27/22 1308     levETIRAcetam (KEPPRA) tablet 1,000 mg, 1,000 mg, Oral, BID, Jamie Clarke MD, 1,000 mg at 07/27/22 0841     Lidocaine (LIDOCARE) 4 % Patch 1 patch, 1 patch, Transdermal, Q24H, 1 patch at 07/26/22 1513 **AND** lidocaine patch in PLACE, , Transdermal, Q8H MARCIA, Elana Warren MD     lidocaine (LMX4) cream, , Topical, Q1H PRN, Nirmal Hagen MD     lidocaine 1 % 0.1-1 mL, 0.1-1 mL, Other, Q1H PRN, Nirmal Hagen MD     Medication instructions: Do NOT use nebulized medications, , Does not apply, Continuous PRN, Nirmal Hagen MD     metroNIDAZOLE (FLAGYL) tablet 500 mg, 500 mg, Oral, BID, Faiza Chandler MD, 500 mg at 07/27/22 0842     naloxone (NARCAN) injection 0.2 mg, 0.2 mg, Intravenous, Q2 Min PRN **OR** naloxone (NARCAN) injection 0.4 mg, 0.4 mg, Intravenous, Q2 Min PRN **OR** naloxone (NARCAN) injection 0.2 mg, 0.2 mg, Intramuscular, Q2 Min PRN **OR** naloxone (NARCAN) injection 0.4 mg, 0.4 mg, Intramuscular, Q2 Min PRN, Ashwini Dukes DO     [Held by provider] nortriptyline (PAMELOR) capsule 50 mg, 50 mg, Oral, BID, Ashwini Dukes DO, 50 mg at 07/25/22 0820     ondansetron (ZOFRAN ODT) ODT tab 4 mg, 4 mg, Oral, Q6H PRN **OR** ondansetron (ZOFRAN) injection 4 mg, 4 mg, Intravenous, Q6H PRN, Nirmal Hagen MD, 4 mg at 07/20/22 0033     OXcarbazepine (TRILEPTAL) tablet 450 mg, 450 mg, Oral, At Bedtime, Ashwini Dukes DO, 450 mg at 07/26/22 2134     pantoprazole (PROTONIX) EC tablet 40 mg, 40 mg, Oral, BID AC, Ashwini Dukes DO, 40 mg at 07/27/22 0645     polyvinyl alcohol (LIQUIFILM TEARS) 1.4 % ophthalmic solution 1 drop, 1 drop, Both Eyes, Q1H PRN, Ashwini Dukes DO     QUEtiapine (SEROquel) half-tab 12.5 mg, 12.5 mg, Oral, Q6H PRN, Ashwini Dukes DO     sennosides (SENOKOT)  "tablet 8.6 mg, 8.6 mg, Oral, BID, Ed Gagnon MD, 8.6 mg at 22 0842     sodium chloride (PF) 0.9% PF flush 3 mL, 3 mL, Intracatheter, Q8H, Nirmal Hagen MD, 3 mL at 22 0843     sodium chloride (PF) 0.9% PF flush 3 mL, 3 mL, Intracatheter, q1 min prn, Nirmal Hagen MD     topiramate (TOPAMAX) tablet 50 mg, 50 mg, Oral, At Bedtime, Ashwini Dukes DO, 50 mg at 22     vancomycin (VANCOCIN) 1000 mg in dextrose 5% 200 mL PREMIX, 1,000 mg, Intravenous, Q12H, Ed Gagnon MD, Last Rate: 200 mL/hr at 22 0319, 1,000 mg at 22 0319    Allergies   Allergen Reactions     Contrast [Iohexol] Rash     Noticed during 2020 admission. Received IV contrast on      Chocolate Headache     Penicillins Rash       Social History     Socioeconomic History     Marital status:      Spouse name: None     Number of children: None     Years of education: None     Highest education level: None   Tobacco Use     Smoking status: Former Smoker     Packs/day: 1.00     Years: 50.00     Pack years: 50.00     Quit date: 2014     Years since quittin.6     Smokeless tobacco: Never Used   Substance and Sexual Activity     Alcohol use: No     Drug use: No   Social History Narrative    Lives alone in the house, relay in TV dinner  grandkids help with house maintenance  Daughter lives close by.  Mara Murillo MD 2018 1:49 PM         Family History   Problem Relation Age of Onset     Cancer Father      Testicular cancer Grandchild 17.00     Breast Cancer Mother      Breast Cancer Sister      Breast Cancer Maternal Aunt      Breast Cancer Sister        Review of Systems: Unobtainable given encephalopathy.     /53 (BP Location: Left arm)   Pulse 94   Temp 97.9  F (36.6  C) (Oral)   Resp 20   Ht 1.651 m (5' 5\")   Wt 52.3 kg (115 lb 3.2 oz)   SpO2 98%   BMI 19.17 kg/m      Intake/Output Summary (Last 24 hours) at 2022 1310  Last data filed at 2022 " 0900  Gross per 24 hour   Intake 7 ml   Output 2550 ml   Net -2543 ml     Physical Exam:   GENERAL: Opens eyes briefly to voice but does not follow commands or verbalize.    EYES: No scleral icterus, conjunctiva clear  ENT: Hearing seems intact , Oral mucosa moist  RESP: Clear to auscultation bilaterally with no respiratory distress, normal effort.  CV: RRR, no murmurs.  No leg edema.    GI: Active BS, Soft, NT/ND, no masses or HSM  Musculoskeletal: Normal muscle bulk/ tone; No gross joint abnormalities  SKIN: No rash, warm/ dry  PSYCH: Does not respond to questions.  Lymph: No cervical adenopathy    LABS:  Recent Labs   Lab 07/27/22  0644 07/26/22  0856 07/25/22  1716 07/25/22  0619 07/24/22  0643 07/23/22  0734 07/22/22  1941 07/22/22  0849 07/21/22  1608 07/21/22  0931   * 129*  --  132*  --  135*  --  142  --  142   POTASSIUM 3.5 3.7 4.6 2.8* 3.9 3.7 3.5 3.4*   < > 3.3*   CHLORIDE 99 96*  --  105  --  104  --  110*  --  108*   CO2 23 24  --  22  --  27  --  23  --  24   BUN 11 10  --  9  --  13  --  19  --  17   CR 0.54* 0.51*  --  0.44* 0.56* 0.57*  --  0.64  --  0.69   GFRESTIMATED >90 >90  --  >90 >90 >90  --  >90  --  89   ADY 8.3* 8.1*  --  6.7*  --  8.4*  --  8.8  --  9.0   MAG 2.0 2.3  --  1.6*  --   --   --   --   --   --    ALBUMIN 2.6*  --   --   --   --  2.3*  --  2.4*  --  2.7*    < > = values in this interval not displayed.         Recent Labs   Lab 07/27/22  0644 07/26/22  0856 07/25/22  0619 07/24/22  0643 07/23/22  0734 07/22/22  0849 07/21/22  0931   WBC 9.9 13.6* 7.2 10.5 12.2* 17.3* 19.7*   HGB 9.8* 9.5* 8.1* 9.7* 8.7* 9.3* 10.2*   HCT 33.9* 32.6* 28.6* 33.5* 30.6* 32.8* 35.4   MCV 72* 70* 73* 71* 72* 73* 72*   * [phone]*           All lab data was reviewed at 1:10 PM

## 2022-07-27 NOTE — PROGRESS NOTES
[facility]    Medicine Progress Note - Hospitalist Service    Date of Admission:  7/19/2022    Assessment & Plan        mri without contrast ,showed gin vasogenic edema and increased enhancement ,will d/w neurology once report is available                          Diet: Snacks/Supplements Adult: Ensure Enlive; Between Meals  Snacks/Supplements Adult: Magic Cup; With Meals  Minced & Moist Diet (level 5) Mildly Thick (level 2)    DVT Prophylaxis: Pneumatic Compression Devices  Abrams Catheter: PRESENT, indication: Retention  Central Lines: PRESENT  PICC Triple Lumen 07/22/22 Right Basilic Vascular access-Site Assessment: WDL  Cardiac Monitoring: None  Code Status: Full Code      Disposition Plan      Expected Discharge Date: 08/01/2022    Discharge Delays: IV Medication - consider oral or Home Infusion  Isolation - find facility that will accept  Placement - TCU  Specialist Consult (enter specialist & decision needed in comments)    Discharge Comments: PICC line placed 7/22- IV abx        The patient's care was discussed with the Attending Physician, Dr. montenegro.    Gemini Montenegro MD  Hospitalist Service  [facility]  Securely message with the Vocera Web Console (learn more here)  Text page via [employer] Paging/Directory         Clinically Significant Risk Factors Present on Admission                      ______________________________________________________________________    Interval History   {Include any events overnight / new information. 4 point ROS is needed for billing a level III (233):    Data reviewed today: I reviewed all medications, new labs and imaging results over the last 24 hours. I personally reviewed no images or EKG's today.    Physical Exam   Vital Signs: Temp: 97.9  F (36.6  C) Temp src: Oral BP: 106/53 Pulse: 94   Resp: 20 SpO2: 98 % O2 Device: None (Room air)    Weight: 115 lbs 3.2 oz  General Appearance: Awake ,  Respiratory: clear to  auscultation  Cardiovascular: RRR  GI: soft  Skin: dry ,no rashes   Other: No cv tenderness    Data    Recent Labs   Lab 07/27/22  0644 07/26/22  0856 07/25/22  1716 07/25/22  0619 07/24/22  0643 07/23/22  0734   WBC 9.9 13.6*  --  7.2   < > 12.2*   HGB 9.8* 9.5*  --  8.1*   < > 8.7*   MCV 72* 70*  --  73*   < > 72*   * 433  --  386   < > 391   * 129*  --  132*  --  135*   POTASSIUM 3.5 3.7 4.6 2.8*   < > 3.7   CHLORIDE 99 96*  --  105  --  104   CO2 23 24  --  22  --  27   BUN 11 10  --  9  --  13   CR 0.54* 0.51*  --  0.44*   < > 0.57*   ANIONGAP 9 9  --  5  --  4*   ADY 8.3* 8.1*  --  6.7*  --  8.4*    103  --  92  --  88   ALBUMIN 2.6*  --   --   --   --  2.3*   PROTTOTAL 6.5  --   --   --   --  6.0   BILITOTAL 0.3  --   --   --   --  0.4   ALKPHOS 89  --   --   --   --  71   ALT 18  --   --   --   --  34   AST 12  --   --   --   --  30    < > = values in this interval not displayed.

## 2022-07-28 LAB
ANION GAP SERPL CALCULATED.3IONS-SCNC: 7 MMOL/L (ref 3–14)
BUN SERPL-MCNC: 13 MG/DL (ref 7–30)
CALCIUM SERPL-MCNC: 8.4 MG/DL (ref 8.5–10.1)
CHLORIDE BLD-SCNC: 102 MMOL/L (ref 94–109)
CO2 SERPL-SCNC: 24 MMOL/L (ref 20–32)
CREAT SERPL-MCNC: 0.44 MG/DL (ref 0.52–1.04)
CRP SERPL-MCNC: 49.1 MG/L (ref 0–8)
D DIMER PPP FEU-MCNC: 0.43 UG/ML FEU (ref 0–0.5)
ERYTHROCYTE [DISTWIDTH] IN BLOOD BY AUTOMATED COUNT: 18.3 % (ref 10–15)
GFR SERPL CREATININE-BSD FRML MDRD: >90 ML/MIN/1.73M2
GLUCOSE BLD-MCNC: 95 MG/DL (ref 70–99)
GLUCOSE BLDC GLUCOMTR-MCNC: 86 MG/DL (ref 70–99)
HCT VFR BLD AUTO: 31.7 % (ref 35–47)
HGB BLD-MCNC: 9.1 G/DL (ref 11.7–15.7)
MCH RBC QN AUTO: 20.6 PG (ref 26.5–33)
MCHC RBC AUTO-ENTMCNC: 28.7 G/DL (ref 31.5–36.5)
MCV RBC AUTO: 72 FL (ref 78–100)
PLATELET # BLD AUTO: 461 10E3/UL (ref 150–450)
POTASSIUM BLD-SCNC: 3.4 MMOL/L (ref 3.4–5.3)
RBC # BLD AUTO: 4.41 10E6/UL (ref 3.8–5.2)
SODIUM SERPL-SCNC: 133 MMOL/L (ref 133–144)
VANCOMYCIN SERPL-MCNC: 18.8 MG/L
WBC # BLD AUTO: 6.4 10E3/UL (ref 4–11)

## 2022-07-28 PROCEDURE — 99291 CRITICAL CARE FIRST HOUR: CPT | Mod: 25 | Performed by: PSYCHIATRY & NEUROLOGY

## 2022-07-28 PROCEDURE — 87040 BLOOD CULTURE FOR BACTERIA: CPT | Performed by: HOSPITALIST

## 2022-07-28 PROCEDURE — 85379 FIBRIN DEGRADATION QUANT: CPT | Performed by: HOSPITALIST

## 2022-07-28 PROCEDURE — 80202 ASSAY OF VANCOMYCIN: CPT

## 2022-07-28 PROCEDURE — 85027 COMPLETE CBC AUTOMATED: CPT | Performed by: HOSPITALIST

## 2022-07-28 PROCEDURE — 80048 BASIC METABOLIC PNL TOTAL CA: CPT | Performed by: HOSPITALIST

## 2022-07-28 PROCEDURE — 250N000013 HC RX MED GY IP 250 OP 250 PS 637: Performed by: HOSPITALIST

## 2022-07-28 PROCEDURE — 36415 COLL VENOUS BLD VENIPUNCTURE: CPT | Performed by: HOSPITALIST

## 2022-07-28 PROCEDURE — 250N000011 HC RX IP 250 OP 636: Performed by: INTERNAL MEDICINE

## 2022-07-28 PROCEDURE — 250N000011 HC RX IP 250 OP 636

## 2022-07-28 PROCEDURE — 258N000003 HC RX IP 258 OP 636: Performed by: INTERNAL MEDICINE

## 2022-07-28 PROCEDURE — C9113 INJ PANTOPRAZOLE SODIUM, VIA: HCPCS | Performed by: HOSPITALIST

## 2022-07-28 PROCEDURE — 99223 1ST HOSP IP/OBS HIGH 75: CPT | Performed by: INTERNAL MEDICINE

## 2022-07-28 PROCEDURE — 250N000011 HC RX IP 250 OP 636: Performed by: HOSPITALIST

## 2022-07-28 PROCEDURE — 99233 SBSQ HOSP IP/OBS HIGH 50: CPT | Performed by: INTERNAL MEDICINE

## 2022-07-28 PROCEDURE — 120N000001 HC R&B MED SURG/OB

## 2022-07-28 PROCEDURE — 86140 C-REACTIVE PROTEIN: CPT | Performed by: HOSPITALIST

## 2022-07-28 RX ORDER — LEVETIRACETAM 10 MG/ML
1000 INJECTION INTRAVASCULAR EVERY 12 HOURS
Status: DISCONTINUED | OUTPATIENT
Start: 2022-07-28 | End: 2022-08-22

## 2022-07-28 RX ORDER — LEVETIRACETAM 5 MG/ML
500 INJECTION INTRAVASCULAR ONCE
Status: COMPLETED | OUTPATIENT
Start: 2022-07-28 | End: 2022-07-28

## 2022-07-28 RX ORDER — SODIUM CHLORIDE 9 MG/ML
INJECTION, SOLUTION INTRAVENOUS CONTINUOUS
Status: DISCONTINUED | OUTPATIENT
Start: 2022-07-28 | End: 2022-07-29

## 2022-07-28 RX ORDER — CEFTRIAXONE 2 G/1
2 INJECTION, POWDER, FOR SOLUTION INTRAMUSCULAR; INTRAVENOUS EVERY 12 HOURS
Status: DISCONTINUED | OUTPATIENT
Start: 2022-07-28 | End: 2022-08-06

## 2022-07-28 RX ADMIN — LEVETIRACETAM 500 MG: 5 INJECTION INTRAVENOUS at 00:48

## 2022-07-28 RX ADMIN — SODIUM CHLORIDE: 9 INJECTION, SOLUTION INTRAVENOUS at 08:17

## 2022-07-28 RX ADMIN — CEFTRIAXONE SODIUM 2 G: 2 INJECTION, POWDER, FOR SOLUTION INTRAMUSCULAR; INTRAVENOUS at 16:34

## 2022-07-28 RX ADMIN — VANCOMYCIN HYDROCHLORIDE 1000 MG: 1 INJECTION, SOLUTION INTRAVENOUS at 05:44

## 2022-07-28 RX ADMIN — METRONIDAZOLE 500 MG: 500 INJECTION, SOLUTION INTRAVENOUS at 16:40

## 2022-07-28 RX ADMIN — PANTOPRAZOLE SODIUM 40 MG: 40 INJECTION, POWDER, FOR SOLUTION INTRAVENOUS at 07:36

## 2022-07-28 RX ADMIN — LEVETIRACETAM 1000 MG: 10 INJECTION INTRAVENOUS at 10:24

## 2022-07-28 RX ADMIN — LEVETIRACETAM 1000 MG: 10 INJECTION INTRAVENOUS at 22:14

## 2022-07-28 RX ADMIN — CEFEPIME HYDROCHLORIDE 2 G: 2 INJECTION, POWDER, FOR SOLUTION INTRAVENOUS at 13:41

## 2022-07-28 RX ADMIN — VANCOMYCIN HYDROCHLORIDE 1000 MG: 1 INJECTION, SOLUTION INTRAVENOUS at 16:41

## 2022-07-28 RX ADMIN — ACETAMINOPHEN 650 MG: 650 SUPPOSITORY RECTAL at 13:41

## 2022-07-28 RX ADMIN — CEFEPIME HYDROCHLORIDE 2 G: 2 INJECTION, POWDER, FOR SOLUTION INTRAVENOUS at 05:44

## 2022-07-28 RX ADMIN — METRONIDAZOLE 500 MG: 500 INJECTION, SOLUTION INTRAVENOUS at 07:36

## 2022-07-28 ASSESSMENT — ACTIVITIES OF DAILY LIVING (ADL)
ADLS_ACUITY_SCORE: 35
CONCENTRATING,_REMEMBERING_OR_MAKING_DECISIONS_DIFFICULTY: YES
ADLS_ACUITY_SCORE: 35
EATING: 0-->INDEPENDENT
ADLS_ACUITY_SCORE: 34
SWALLOWING: 2-->DIFFICULTY SWALLOWING LIQUIDS
TOILETING_ISSUES: NO
ADLS_ACUITY_SCORE: 30
ADLS_ACUITY_SCORE: 35
WEAR_GLASSES_OR_BLIND: NO
DRESSING/BATHING_DIFFICULTY: NO
EATING/SWALLOWING: OTHER (SEE COMMENTS)
SWALLOWING: 2-->DIFFICULTY SWALLOWING LIQUIDS
EQUIPMENT_CURRENTLY_USED_AT_HOME: GRAB BAR, TUB/SHOWER
ADLS_ACUITY_SCORE: 35
DIFFICULTY_EATING/SWALLOWING: YES
DOING_ERRANDS_INDEPENDENTLY_DIFFICULTY: YES
WALKING_OR_CLIMBING_STAIRS_DIFFICULTY: NO
FALL_HISTORY_WITHIN_LAST_SIX_MONTHS: YES
ADLS_ACUITY_SCORE: 34
ADLS_ACUITY_SCORE: 35
ADLS_ACUITY_SCORE: 41
ADLS_ACUITY_SCORE: 35
NUMBER_OF_TIMES_PATIENT_HAS_FALLEN_WITHIN_LAST_SIX_MONTHS: 1
CHANGE_IN_FUNCTIONAL_STATUS_SINCE_ONSET_OF_CURRENT_ILLNESS/INJURY: YES
ADLS_ACUITY_SCORE: 35
EATING: 0-->INDEPENDENT
ADLS_ACUITY_SCORE: 35

## 2022-07-28 NOTE — PROGRESS NOTES
Preliminary EEG report:    EEG was reviewed till 3 PM.  No posterior dominant rhythm was appreciated.  Diffuse polymorphic delta slowing present.  These at times were sharply contoured, especially over the posterior head regions.  Periodic  sharp waves were seen over the posterior head regions, maximal 01/02 1.5 to 2 Hz.  Findings are consistent with severe diffuse nonspecific encephalopathy with an additional cortical irritability over the posterior head regions.  No electrographic seizures were recorded.    Full report to come.    Luisa Nowak MD  Epilepsy Staff  [phone]

## 2022-07-28 NOTE — PROGRESS NOTES
Called RN  To RN report to nurse Hafsa. Was informed that patient is going to room 360. Called patient's daughter Alissa to update regarding transfer but no one answer. Left message to call back.

## 2022-07-28 NOTE — CONSULTS
[facility]    Infectious Disease Consultation     Date of Admission:  7/27/2022  Date of Consult (When I saw the patient): 07/28/22    Assessment & Plan   Julisa Chaney is a 77 year old female who was admitted on 7/27/2022.     Impression:  1. 77 y.o female transferred from OSH  2. Admitted first with on 7/ 19   3. Diagnosed with COVID( vaccinated)  though not much hypoxia, initial CXR clear   4. On treatment with remdesivir also on Vanco and meropenem which was switched to Vanco, cefepime and fagyl on 7/26  5. Hospitalization complicated with continued encephalopathy   6. MRI: .  Focal diffusion restriction centered at the left frontal horn with surrounding vasogenic edema. In the setting of sepsis, this raises concern for cerebral abscess. Alternatively, neoplastic process would be a consideration.   Focal areas of diffusion restriction within the occipital horns, concerning for pus/ventriculitis. Alternatively this could represent hemorrhage.  7. Transferred to [facility] for NS consultation.     Recommendations:  Needs tissue diagnosis LP vs abscess cultures discussed with NCC  Cell count with diff, glucose, protein, cultures, meningitis/ encephalitis panel.   Also consult with oncology for if CSF samplings needs to be sent for oncological analysis   Continue on vanco + ceftriaxone BID + flagyl   Will follow     Cayetano Schmitt MD    Reason for Consult   Reason for consult: I was asked to evaluate this patient for possible brain abscesses/hemmhage/neoplasm.    Primary Care Physician   Mara Murillo    Chief Complaint   Shortness of breath     History is obtained from the patient and medical records    History of Present Illness   Julisa Chaney is a 77 year old female who presents with Severe sepsis and acute metabolic encephalopathy suspected due to intracerebral abscess causing acute ventriculitis: Ms. Chaney initially presented for evaluation of diarrhea and cough. She had leukocytosis,  elevated lactate and pro-calcitonin at admission. Blood cultures from 7/20 were negative and CXR was negative for acute pathology. She was treated as below for COVID and also for suspected bacterial infection, such as pneumonia, with vancomycin and Meropenem initially. We note however that she has never been hypoxic. Her course has been complicated by severe encephalopathy. Head CT on 7/25 showed a possible left frontal mass causing vasogenic edema, and MRI done on day of transfer here showed a possible left sided intracerebral abscess (vs neoplasm), causing suspected purulent ventriculitis. Thus, this could in fact be the source of her presenting symptoms. Her antibiotics were switched prior to transfer to Vancomycin, Cefepime, and (oral) Flagyl. MRSA nasal swab and nasal culture from 7/20 were positive and she could be at risk of MRSA. She is transferred here for neurosurgical assessment.    Past Medical History   I have reviewed this patient's medical history and updated it with pertinent information if needed.   Past Medical History:   Diagnosis Date     Aspiration pneumonia (H) 02/2022     Depression      High cholesterol      Migraines      Pyloric ulcer, chronic      Sepsis (H) 08/10/2020     Trigeminal neuralgia        Past Surgical History   I have reviewed this patient's surgical history and updated it with pertinent information if needed.  Past Surgical History:   Procedure Laterality Date     HYSTERECTOMY       PICC TRIPLE LUMEN PLACEMENT  7/22/2022          IA ESOPHAGOGASTRODUODENOSCOPY TRANSORAL DIAGNOSTIC N/A 8/13/2020    Procedure: ESOPHAGOGASTRODUODENOSCOPY (EGD);  Surgeon: Nathan Smalls MD;  Location: [facility] [place];  Service: Gastroenterology     IA ESOPHAGOGASTRODUODENOSCOPY TRANSORAL DIAGNOSTIC N/A 8/14/2020    Procedure: ESOPHAGOGASTRODUODENOSCOPY (EGD), PYLORIC DILATION;  Surgeon: Nathan Smalls MD;  Location: [facility] [place];  Service: Gastroenterology     [facility] COLONOSCOPY W/WO  BRUSH/WASH N/A 8/13/2020    Procedure: COLONOSCOPY WITH POLYPECTOMY;  Surgeon: Nathan Smalls MD;  Location: [facility] [place];  Service: Gastroenterology       Prior to Admission Medications   Prior to Admission Medications   Prescriptions Last Dose Informant Patient Reported? Taking?   ARTIFICIAL TEARS 1.4 % ophthalmic solution prn  No Yes   Sig: INSTILL 2 DROPS IN BOTH EYES AS NEEDED   OXcarbazepine (TRILEPTAL) 150 MG tablet 7/26/2022 at 2130  No Yes   Sig: TAKE 3 TABLETS(450 MG) BY MOUTH AT BEDTIME   OXcarbazepine (TRILEPTAL) 150 MG tablet   No No   Sig: Take 3 tablets (450 mg) by mouth At Bedtime   acetaminophen-codeine (TYLENOL #3) 300-30 MG tablet 7/24/2022  No Yes   Sig: TAKE 1 TABLET BY MOUTH EVERY 6 HOURS AS NEEDED FOR PAIN   cholecalciferol, vitamin D3, 50 mcg (2,000 unit) Tab   No Yes   Sig: [CHOLECALCIFEROL, VITAMIN D3, 50 MCG (2,000 UNIT) TAB] Take 1 tablet (2,000 Units total) by mouth daily. One tab daily   desonide (DESOWEN) 0.05 % cream   No Yes   Sig: [DESONIDE (DESOWEN) 0.05 % CREAM] Apply to affected area 2 times daily   Patient taking differently: Apply topically 2 times daily as needed   ferrous sulfate 325 (65 FE) MG tablet   No Yes   Sig: [FERROUS SULFATE 325 (65 FE) MG TABLET] Take 1 tablet (325 mg total) by mouth daily with breakfast.   levETIRAcetam (KEPPRA) 500 MG tablet 7/27/2022 at 0900-1g  No Yes   Sig: Take 1 tablet (500 mg) by mouth 2 times daily   mirtazapine (REMERON) 15 MG tablet Unknown at pt may have stopped PTA  No Yes   Sig: Take 1 tablet (15 mg) by mouth At Bedtime   multivitamin (DAILY-DAVID) per tablet   No Yes   Sig: [MULTIVITAMIN (DAILY-DAVID) PER TABLET] Take 1 tablet by mouth daily.   nortriptyline (PAMELOR) 25 MG capsule 7/25/2022 at 0900  Yes Yes   Sig: Take 50 mg by mouth 2 times daily   nortriptyline (PAMELOR) 50 MG capsule   No No   Sig: Take 1 capsule (50 mg) by mouth 2 times daily   Patient not taking: No sig reported   omeprazole (PRILOSEC) 40 MG DR capsule  7/27/2022 at 1600-Protonix at outside hospital  No Yes   Sig: Take 1 capsule (40 mg) by mouth daily   polyvinyl alcohol (ARTIFICIAL TEARS) 1.4 % ophthalmic solution   No No   Sig: Place 1 drop into both eyes every hour as needed for dry eyes   senna-docusate (SENOKOT-S/PERICOLACE) 8.6-50 MG tablet   No Yes   Sig: Take 1 tablet by mouth At Bedtime   topiramate (TOPAMAX) 50 MG tablet 7/26/2022 at 2130  No Yes   Sig: Take 1 tablet (50 mg) by mouth At Bedtime      Facility-Administered Medications: None     Allergies   Allergies   Allergen Reactions     Contrast [Iohexol] Rash     Noticed during 08/2020 admission. Received IV contrast on 8/5     Chocolate Headache     Penicillins Rash       Immunization History   Immunization History   Administered Date(s) Administered     COVID-19,PF,Pfizer 12+ Yrs (2022 and After) 02/17/2022, 05/19/2022     DT (PEDS <7y) 08/30/2000     FLU 6-35 months 10/09/2010, 09/30/2011, 11/13/2012, 11/21/2013     Flu, Unspecified 11/07/2007, 10/22/2008, 10/02/2009     HepA, Unspecified 04/07/2010     HepA-Adult 04/07/2010     Influenza (H1N1) 01/15/2010     Influenza (High Dose) 3 valent vaccine 10/29/2015, 09/08/2016, 10/15/2018, 01/06/2020     Influenza (IIV3) PF 11/02/2005, 11/15/2006, 11/07/2007, 10/22/2008, 10/02/2009     Influenza Vaccine IM > 6 months Valent IIV4 (Alfuria,Fluzone) 10/10/2014     Influenza, Quad, High Dose, Pf, 65yr+ (Fluzone HD) 02/10/2022     Pneumo Conj 13-V (2010&after) 10/29/2015     Pneumococcal 23 valent 10/10/2014     TD (ADULT, 7+) 05/10/2018     Td,adult,historic,unspecified 11/07/2007     Tdap (Adacel,Boostrix) 11/07/2007     Zoster vaccine, live 11/07/2007       Social History   I have reviewed this patient's social history and updated it with pertinent information if needed. Julisa RAOUL Chaney  reports that she quit smoking about 7 years ago. She has a 50.00 pack-year smoking history. She has never used smokeless tobacco. She reports that she does not drink  alcohol and does not use drugs.    Family History   I have reviewed this patient's family history and updated it with pertinent information if needed.   Family History   Problem Relation Age of Onset     Cancer Father      Testicular cancer Grandchild 17.00     Breast Cancer Mother      Breast Cancer Sister      Breast Cancer Maternal Aunt      Breast Cancer Sister        Review of Systems   The 10 point Review of Systems is negative other than noted in the HPI or here.     Physical Exam   Temp: (!) 96.7  F (35.9  C) Temp src: Axillary BP: (!) 155/70 Pulse: 107   Resp: 22 SpO2: 95 % O2 Device: None (Room air)    Vital Signs with Ranges  Temp:  [96.7  F (35.9  C)-98.4  F (36.9  C)] 96.7  F (35.9  C)  Pulse:  [] 107  Resp:  [8-25] 22  BP: (106-155)/(53-78) 155/70  SpO2:  [82 %-100 %] 95 %  109 lbs 2.04 oz  Body mass index is 18.16 kg/m .    GENERAL APPEARANCE:  awake  EYES: Eyes grossly normal to inspection  NECK: no adenopathy  RESP: lungs clear   CV: regular rates and rhythm  LYMPHATICS: normal ant/post cervical and supraclavicular nodes  ABDOMEN: soft, nontender  MS: extremities normal  SKIN: no suspicious lesions or rashes        Data   Lab Results   Component Value Date    WBC 6.4 07/28/2022    HGB 9.1 (L) 07/28/2022    HCT 31.7 (L) 07/28/2022     (H) 07/28/2022     07/28/2022    POTASSIUM 3.4 07/28/2022    CHLORIDE 102 07/28/2022    CO2 24 07/28/2022    BUN 13 07/28/2022    CR 0.44 (L) 07/28/2022    GLC 95 07/28/2022    SED 13 08/08/2020    DD 0.43 07/28/2022    AST 11 07/27/2022    ALT 16 07/27/2022    ALKPHOS 85 07/27/2022    BILITOTAL 0.4 07/27/2022    DANELLE 30 07/25/2022    INR 1.13 07/27/2022     No results for input(s): CULT in the last 168 hours.  No lab results found.    Invalid input(s): UC

## 2022-07-28 NOTE — PROGRESS NOTES
Patient drowsy, but arouses to voice ,and oriented to self and at times place overnight, neuro intact. At times complains of some neck pain, given tylenol PRN with relief, given meds with apple sauce, was on dysphagia diet at [facility], tolerated well. On RA, has no breathing difficulties, clear, diminished lung sounds. Getting antiobiotics and keppra overnight. Seen by neuro, no changes to care made overnight. Cool extremities, warm blankets placed. At 0600, patient refusing to follow some commands and refusing to answer orientation questions, denies any pain or complaints.

## 2022-07-28 NOTE — CONSULTS
"CLINICAL NUTRITION SERVICES  -  ASSESSMENT NOTE      Future/Additional Recommendations: If unable to advance diet in the next 24-48 hours, consider FT in patient with severe malnutrition    Malnutrition: % Weight Loss:  > 10% in 6 months (severe malnutrition)  % Intake:  </= 50% for >/= 5 days (severe malnutrition)  Subcutaneous Fat Loss:  Unable to determine d/t COVID isolation   Muscle Loss:  Unable to determine d/t COVID isolation   Fluid Retention:  None noted    Malnutrition Diagnosis: Severe malnutrition  In Context of:  Acute illness or injury        REASON FOR ASSESSMENT  Julisa Chaney is a 77 year old female seen by Registered Dietitian for Admission Nutrition Risk Screen for Have you recently lost weight without trying? - Unsure   Have you eaten poorly because of a decreased appetite? - Yes       NUTRITION HISTORY  - Information obtained from [facility] as patient in COVID isolation -- unable to visit.  - Patient was admitted to [facility] earlier this month (7/19) and transferred to [facility] yesterday.  My colleague saw patient two days ago (7/26) and the following history was obtained at that time:  Pt lives with her son, grandson  Ill x 5 days PTA with nausea, diarrhea. Not able to keep anything down x 1 day PTA    - Intake was poor during her stay at [facility] (0-25% of meals).  She was receiving a level 5 minced and moist dysphagia diet with mildly thick liquids at that time.  Patient was also receiving Ensure during that say and taking sips.         CURRENT NUTRITION ORDERS  Diet Order:     NPO     Current Intake/Tolerance:  N/A      NUTRITION FOCUSED PHYSICAL ASSESSMENT FOR DIAGNOSING MALNUTRITION)  No:  COVID isolation                 Observed:    Unable     Obtained from Chart/Interdisciplinary Team:  None     ANTHROPOMETRICS  Height: 5'5\"  Weight: 48.3 kg (107#)(7/27)  Body mass index is 17.8 kg/m   Weight Status:  Underweight BMI <18.5  IBW: 56.8 kg  % IBW: 85%  Weight History:   Wt Readings from " Last 10 Encounters:   07/28/22 49.5 kg (109 lb 2 oz)   07/26/22 52.3 kg (115 lb 3.2 oz)   05/19/22 54.9 kg (121 lb)   02/17/22 54.3 kg (119 lb 12.8 oz)   02/10/22 53.6 kg (118 lb 1.6 oz)   05/04/21 51.4 kg (113 lb 4.8 oz)   08/21/20 49.9 kg (110 lb)   08/15/20 51.8 kg (114 lb 3.2 oz)   08/15/20 51.8 kg (114 lb 3.2 oz)   08/15/20 51.8 kg (114 lb 3.2 oz)   Patient is down 14# or 12% over the last 5 months --> appears that the majority of this weight loss has been more recently as she weighed in at 120# on 7/23 at [facility] (recent weight loss likely d/t poor intake over the last 7-10 days)      LABS  Labs reviewed    MEDICATIONS  Medications reviewed      ASSESSED NUTRITION NEEDS PER APPROVED PRACTICE GUIDELINES:    Dosing Weight 48.3 kg   Estimated Energy Needs: 4392-9039 kcals (35-40 Kcal/Kg)  Justification: repletion and underweight  Estimated Protein Needs: 70-95 grams protein (1.5-2 g pro/Kg)  Justification: repletion and hypercatabolism with critical illness  Estimated Fluid Needs: 4728-0445 mL (1 mL/Kcal)  Justification: maintenance    MALNUTRITION:  % Weight Loss:  > 10% in 6 months (severe malnutrition)  % Intake:  </= 50% for >/= 5 days (severe malnutrition)  Subcutaneous Fat Loss:  Unable to determine d/t COVID isolation   Muscle Loss:  Unable to determine d/t COVID isolation   Fluid Retention:  None noted    Malnutrition Diagnosis: Severe malnutrition  In Context of:  Acute illness or injury    NUTRITION DIAGNOSIS:  Inadequate protein-energy intake related to NPO as evidenced by meeting 0% needs, limited intake over the past 7-10 days, significant weight loss       NUTRITION INTERVENTIONS  Recommendations / Nutrition Prescription  ADAT per MD discretion  If unable to advance diet in the next 24-48 hours, consider FT in patient with severe malnutrition     Implementation  Nutrition education: Not appropriate at this time due to patient condition    Nutrition Goals  Diet to advance within the next 24-48  hours and intake will be at least 50-75% meals     MONITORING AND EVALUATION:  Progress towards goals will be monitored and evaluated per protocol and Practice Guidelines    Nubia Ruiz RD, LD, CNSC   Clinical Dietitian - [facility]

## 2022-07-28 NOTE — PHARMACY-VANCOMYCIN DOSING SERVICE
Pharmacy Vancomycin Note  Date of Service 2022  Patient's  1945   77 year old, female    Indication: Meningitis  Day of Therapy: 9   Current vancomycin regimen:  1000 mg IV q12h  Current vancomycin monitoring method: Trough (Method 1 = dosing nomogram)  Current vancomycin therapeutic monitoring goal: 15-20 mg/L    [employer] Prediction of Current Vancomycin Regimen      Current estimated CrCl = Estimated Creatinine Clearance: 83.7 mL/min (A) (based on SCr of 0.44 mg/dL (L)).    Creatinine for last 3 days  2022:  8:56 AM Creatinine 0.51 mg/dL  2022:  6:44 AM Creatinine 0.54 mg/dL; 10:31 PM Creatinine 0.36 mg/dL  2022:  4:08 AM Creatinine 0.44 mg/dL    Recent Vancomycin Levels (past 3 days)  2022:  1:58 PM Vancomycin 14.8 mg/L  2022:  4:08 AM Vancomycin 18.8 mg/L    Vancomycin IV Administrations (past 72 hours)                   vancomycin (VANCOCIN) 1000 mg in dextrose 5% 200 mL PREMIX (mg) 1,000 mg New Bag 22 0544    vancomycin (VANCOCIN) 1000 mg in dextrose 5% 200 mL PREMIX (mg) 1,000 mg New Bag 22 1623     1,000 mg New Bag  0319     1,000 mg New Bag 22 1625     1,000 mg New Bag  0341     1,000 mg New Bag 22 1811                Nephrotoxins and other renal medications (From now, onward)    Start     Dose/Rate Route Frequency Ordered Stop    22 0500  vancomycin (VANCOCIN) 1000 mg in dextrose 5% 200 mL PREMIX         1,000 mg  200 mL/hr over 1 Hours Intravenous EVERY 12 HOURS 22 2242               Contrast Orders - past 72 hours (72h ago, onward)    None          Interpretation of levels and current regimen:  Vancomycin level is reflective of therapeutic level    Has serum creatinine changed greater than 50% in last 72 hours:     Urine output:      Renal Function: Stable    [employer] Prediction of Planned New Vancomycin Regimen      Plan:  1. Continue Current Dose  2. Vancomycin monitoring method: Trough (Method 1 = dosing  nomogram)  3. Vancomycin therapeutic monitoring goal: 15-20 mg/L  4. Pharmacy will check vancomycin levels as appropriate in 1-3 Days.  5. Serum creatinine levels will be ordered daily for the first week of therapy and at least twice weekly for subsequent weekss.    Ed Singh [facility]

## 2022-07-28 NOTE — H&P
[facility]    History and Physical  Hospitalist       Date of Admission:  7/27/2022  Date of Service (when I saw the patient): 07/27/22    ASSESSMENT  Julisa Chaney is a pleasant 77 year old woman with past medical history that is most notable for trigeminal neuralgia, among others; who was [facility]2022 for severe sepsis suspected due to COVID infection and suspected bacterial pneumonia. 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 She is transferred here for Neurosurgical evaluation.    PLAN    Severe sepsis and acute metabolic encephalopathy suspected due to intracerebral abscess causing acute ventriculitis: Ms. Chaney initially presented for evaluation of diarrhea and cough. She had leukocytosis, elevated lactate and pro-calcitonin at admission. Blood cultures from 7/20 were negative and CXR was negative for acute pathology. She was treated as below for COVID and also for suspected bacterial infection, such as pneumonia, with vancomycin and Meropenem initially. We note however that she has never been hypoxic. Her course has been complicated by severe encephalopathy. Head CT on 7/25 showed a possible left frontal mass causing vasogenic edema, and MRI done on day of transfer here showed a possible left sided intracerebral abscess (vs neoplasm), causing suspected purulent ventriculitis. Thus, this could in fact be the source of her presenting symptoms. Her antibiotics were switched prior to transfer to Vancomycin, Cefepime, and (oral) Flagyl. MRSA nasal swab and nasal culture from 7/20 were positive and she could be at risk of MRSA. She is transferred here for neurosurgical assessment.    -- ICU. NPO. Q 1 hour neuro checks for now. Neurosurgery consulted. I have discussed her case with Shazia Caban of Neurosurgery and guidance  appreciated.    -- STAT CBC, CMP, Lactate, INR, blood cultures ordered.    -- Empiric antibiotics ordered: Vancomycin, Cefepime and Flagyl, at meningitis dosing    -- ID consulted     -- I have called her daughter Alissa Sutton and updated her on her mother's care.    Acute COVID-19 infection: Diagnosed 7/19/22. She received 5 days of Remdesivir. Dexamethasone was not administered as she was never hypoxic there it seems. CRP lanie as high as 28 on 7/21/2022 and has now trended down to 5.7. D-Dimer peaked at 1.91 on 7/21/2022 and decreased to 0.82 on 7/26. She has no acute respiratory symptoms at this time.    -- Maintain precautions for now    -- On Lovenox at [facility] for VTE chemoprophylaxis; holding tonight pending Neurosurgery assessment    Possible seizures:  Ms. Chaney takes Keppra as an outpatient. It seems that this medication is not for prior seizures, rather it was prescribed for ataxia and tremors noted 2/2022 when she was hospitalized for aspiration pneumonia (though the overall documentation from OSH is unclear on this point). On this admission, an EEG was obtained and per the read, possible seizure activity could be sen in the left frontal lobe.    -- Seizure precautions. Keppra continued at 1000 mg IV BID (Keppra level on 7/26 was 6 so her home dose was doubled to this amount earlier today it seems)    -- Neurocritical care consulted for further evaluation    -- Tylenol as needed for headache    Hyponatremia, acute: Na as low as 129 during hospitalization, for which Nephrology was consulted; SIADH was suspected and she reportedly has been on fluid restriction there. Na was improved today to 131.     -- Repeat labs ordered as above    Chronic anemia of iron deficiency: HGB 9.2 in 2/2022 and has run in aimilar range since admission. Monitor while hospitalized. Resume home iron when verified.  Recent Labs   Lab Test 07/27/22  2231 07/27/22  0644 07/26/22  0856   HGB 9.6* 9.8* 9.5*     History of trigeminal  neuralgia: Resume home Oxcarbazipine, Topamax when verified.    History of chronic pyloric ulcer: Reportedly. An EGD done in 2020 reportedly shoed gastroduodenitis with a duodenal stricture.    -- IV PPI ordered while she is here    Chief Complaint   Diarrhea    History is obtained from the patient, her daughter by phone, and the [facility] electronic medical record    History of Present Illness   Julisa Chaney is a pleasant 77 year old woman who initially presented to the [facility] ED on 7/19/2022 with acute onset of 5 days of watery, non-bloody diarrhea, associated with fatigue, light headedness and at least one fall at home, as well as myalgias and cough. It was noted on admission that she had been reportedly vaccinated for COVID and had received a booster shot approximately one month prior. In the ED, she had a fever with tachycardia. She was not noted to be hypoxic at admission. Initial WBC showed 32.5, HGB 11.7, . Lactate was 4.0. CT of the abdomen and pelvis showed bi-basilar pneumonia, without evidence of acute intra-abdominal process, and COVID PCR was positive. She was therefore admitted and treated for COVID with 5 days of Remdesivir. Pro-calcitonin was 2.11. ID was consulted and super-imposed bacterial pneumonia suspected, and she was started on vancomycin and meropenem. It seems that over the course of her hospitalization she was noted to have ongoing severe associated confusion. Neurology was consulted and an EEG was done which showed slowing and possible epileptiform activity in the left frontal lobe. Head CT was done on 7/25, and this showed a left frontal lesion with some associated vasogenic edema. Thus, today she underwent an MRI of the brain, and this showed a left frontal mass consistent with possible abscess for neoplasm, as well as possible purulent ventriculitis. Her antibiotics were changed to include vancomycin and cefepime as well as oral flagyl. Her case has been discussed  "with Neurosurgery, and she is now transferred here. On my assessment in our ICU this evening, she is sleeping, easily arousable, and conversant when awake but needs substantial re-orientation to keep her focus on the conversation. She denies pain though grimaces and says \"ouch\" when her head is moved. She denies dyspnea, cough, or chest pain, or other acute complaints. She falls back asleep several times during the interview.    EEG from 7/26/2022: IMPRESSION/REPORT/PLAN  This is an abnormal EEG during wakefulness and drowsiness due to generalized slowing and more severe left frontotemporal region slowing with intermittent sharp wave activity.  EEG is suggestive of left frontotemporal cerebral dysfunction and epilepsy.  EEG is also suggestive of an ongoing encephalopathy.  Further clinical correlation is needed.    Recent Results (from the past 24 hour(s))   MR Brain w/o Contrast    Narrative    EXAM: MR BRAIN W/O CONTRAST  LOCATION: [facility]  DATE/TIME: 7/27/2022 12:08 PM    INDICATION: Brain mass  COMPARISON: Head CT 7/25/2022 and 2/8/2022.  TECHNIQUE: Routine multiplanar multisequence head MRI without intravenous contrast.    FINDINGS:  INTRACRANIAL CONTENTS: On the left frontal horn of the lateral ventricle, there is focal area of diffusion restriction measuring 1.8 x 1.0 x 1.0 cm, with larger area of surrounding vasogenic edema throughout the left frontal white matter. There are   additional areas of diffusion restriction within the occipital horns bilaterally. Intermediate signal material at the left foramen of Monro measuring 5 mm, with asymmetric mild enlargement of the left lateral ventricle compared to the right.    No extra-axial collection. No significant mass effect or shift in midline structures. Mild scattered nonspecific T2 hyperintensity within the cerebral white matter and danyel, most likely representing mild microvascular ischemic change.    SELLA: No abnormality " accounting for technique.    OSSEOUS STRUCTURES/SOFT TISSUES: Normal marrow signal. The major intracranial vascular flow voids are maintained.     ORBITS: No abnormality accounting for technique.     SINUSES/MASTOIDS: No paranasal sinus mucosal disease. No middle ear or mastoid effusion.       Impression    IMPRESSION:  1.  Focal diffusion restriction centered at the left frontal horn with surrounding vasogenic edema. In the setting of sepsis, this raises concern for cerebral abscess. Alternatively, neoplastic process would be a consideration.  2.  Focal areas of diffusion restriction within the occipital horns, concerning for pus/ventriculitis. Alternatively this could represent hemorrhage.  3.  Mild asymmetric enlargement of the left lateral ventricle with material at the foramen Monro, consistent with mild hydrocephalus.  4.  Consider spinal fluid sampling. Postcontrast imaging including susceptibility weighted imaging would be helpful for further evaluation.    The results were communicated to Dr. Montenegro at 2:05 PM on 07/27/2022.       PHYSICAL EXAM  Blood pressure 134/65, pulse 104, temperature 98.4  F (36.9  C), temperature source Axillary, resp. rate 25, weight 48.3 kg (106 lb 7.7 oz), SpO2 96 %, not currently breastfeeding.  Constitutional: Somnolent but arousable to person only; no apparent distress  HEENT: prominent upper teeth are possibly dentures; dry mucus membranes  Respiratory: lungs clear to auscultation bilaterally  Cardiovascular: regular S1 S2  GI: abdomen soft non tender non distended bowel sounds positive  Lymph/Hematologic: pale, no cervical lymphadenopathy  Skin: no rash, good turgor  Musculoskeletal: no clubbing, cyanosis or edema  Neurologic: extra-ocular muscles intact; PERRL; moves all four extremities  Psychiatric: GCS 12, limited insight and judgment     DVT Prophylaxis: Pneumatic Compression Devices  Code Status: Full Code    Disposition: Expected discharge in several days    Zain  Perez Moon MD, MD    Past Medical History    I have reviewed this patient's medical history and updated it with pertinent information if needed.   Past Medical History:   Diagnosis Date     Aspiration pneumonia (H) 02/2022     Depression      High cholesterol      Migraines      Pyloric ulcer, chronic      Sepsis (H) 08/10/2020     Trigeminal neuralgia        Past Surgical History   I have reviewed this patient's surgical history and updated it with pertinent information if needed.  Past Surgical History:   Procedure Laterality Date     HYSTERECTOMY       PICC TRIPLE LUMEN PLACEMENT  7/22/2022          CO ESOPHAGOGASTRODUODENOSCOPY TRANSORAL DIAGNOSTIC N/A 8/13/2020    Procedure: ESOPHAGOGASTRODUODENOSCOPY (EGD);  Surgeon: Nathan Smalls MD;  [place] [facility] [place]     [place] ESOPHAGOGASTRODUODENOSCOPY TRANSORAL DIAGNOSTIC N/A 8/14/2020    Procedure: ESOPHAGOGASTRODUODENOSCOPY (EGD), PYLORIC DILATION;  Surgeon: Nathan Smalls MD;  [place] [facility] [place]  Service: [place]ology     [facility] COLONOSCOPY W/WO BRUSH/WASH N/A 8/13/2020    Procedure: COLONOSCOPY WITH POLYPECTOMY;  Surgeon: Nathan Smalls MD;  [place] [facility] [place]  Service: G[place]enterology       Prior to Admission Medications   Prior to Admission Medications   Prescriptions Last Dose Informant Patient Reported? Taking?   ARTIFICIAL TEARS 1.4 % ophthalmic solution prn  No Yes   Sig: INSTILL 2 DROPS IN BOTH EYES AS NEEDED   OXcarbazepine (TRILEPTAL) 150 MG tablet 7/26/2022 at 2130  No Yes   Sig: TAKE 3 TABLETS(450 MG) BY MOUTH AT BEDTIME   OXcarbazepine (TRILEPTAL) 150 MG tablet   No No   Sig: Take 3 tablets (450 mg) by mouth At Bedtime   acetaminophen-codeine (TYLENOL #3) 300-30 MG tablet 7/24/2022  No Yes   Sig: TAKE 1 TABLET BY MOUTH EVERY 6 HOURS AS NEEDED FOR PAIN   cholecalciferol, vitamin D3, 50 mcg (2,000 unit) Tab   No Yes   Sig: [CHOLECALCIFEROL, VITAMIN D3, 50 MCG (2,000 UNIT) TAB] Take 1  tablet (2,000 Units total) by mouth daily. One tab daily   desonide (DESOWEN) 0.05 % cream   No Yes   Sig: [DESONIDE (DESOWEN) 0.05 % CREAM] Apply to affected area 2 times daily   Patient taking differently: Apply topically 2 times daily as needed   ferrous sulfate 325 (65 FE) MG tablet   No Yes   Sig: [FERROUS SULFATE 325 (65 FE) MG TABLET] Take 1 tablet (325 mg total) by mouth daily with breakfast.   levETIRAcetam (KEPPRA) 500 MG tablet 7/27/2022 at 0900-1g  No Yes   Sig: Take 1 tablet (500 mg) by mouth 2 times daily   mirtazapine (REMERON) 15 MG tablet Unknown at pt may have stopped PTA  No Yes   Sig: Take 1 tablet (15 mg) by mouth At Bedtime   multivitamin (DAILY-DAVID) per tablet   No Yes   Sig: [MULTIVITAMIN (DAILY-DAVID) PER TABLET] Take 1 tablet by mouth daily.   nortriptyline (PAMELOR) 25 MG capsule 7/25/2022 at 0900  Yes Yes   Sig: Take 50 mg by mouth 2 times daily   nortriptyline (PAMELOR) 50 MG capsule   No No   Sig: Take 1 capsule (50 mg) by mouth 2 times daily   Patient not taking: No sig reported   omeprazole (PRILOSEC) 40 MG DR capsule 7/27/2022 at 1600-Protonix at outside hospital  No Yes   Sig: Take 1 capsule (40 mg) by mouth daily   polyvinyl alcohol (ARTIFICIAL TEARS) 1.4 % ophthalmic solution   No No   Sig: Place 1 drop into both eyes every hour as needed for dry eyes   senna-docusate (SENOKOT-S/PERICOLACE) 8.6-50 MG tablet   No Yes   Sig: Take 1 tablet by mouth At Bedtime   topiramate (TOPAMAX) 50 MG tablet 7/26/2022 at 2130  No Yes   Sig: Take 1 tablet (50 mg) by mouth At Bedtime      Facility-Administered Medications: None     Allergies   Allergies   Allergen Reactions     Contrast [Iohexol] Rash     Noticed during 08/2020 admission. Received IV contrast on 8/5     Chocolate Headache     Penicillins Rash       Social History   I have reviewed this patient's social history and updated it with pertinent information if needed. Julisa Chaney  reports that she quit smoking about 7 years ago. She  has a 50.00 pack-year smoking history. She has never used smokeless tobacco. She reports that she does not drink alcohol and does not use drugs.    Family History   Family history assessed and, except as above, is non-contributory.    Family History   Problem Relation Age of Onset     Cancer Father      Testicular cancer Grandchild 17.00     Breast Cancer Mother      Breast Cancer Sister      Breast Cancer Maternal Aunt      Breast Cancer Sister        Review of Systems   The 10 point Review of Systems is negative other than noted in the HPI or here.     Primary Care Physician   Mara Murillo    Data   Labs Ordered and Resulted from Time of ED Arrival to Time of ED Departure - No data to display    Data reviewed today:  I personally reviewed the brain MRI image(s) showing possible intracerebral abscess.

## 2022-07-28 NOTE — CONSULTS
"      [facility]    Neurocritical Care/Stroke Consult Note    Reason for Consult:  \"Cerebral abscess and possible seizures\"    Chief Complaint: No chief complaint on file.       BESS Chaney is a 77 year old female with history of trigeminal neuralgia, chronic pain, migraines, hyperlipidemia, hypercalcemia, iron deficiency anemia, depression, pyloric ulcer. Patient presented to [facility] on 7/19/22 with complainants of generalized body aches, chest pain, fevers, and five days of watery diarrhea. She was admitted for with COVID, severe sepsis and encephalopathy. After further work up for her encephalopathy/delerium patient was found to have cerebral abscess/small mass lesion on CT and MRI brain. Also had routine EEG placed, which was suggestive of left frontotemporal cerebral dysfunction and suggestive of an ongoing encephalopathy. Patient then transferred to [facility] for possible neurosurgical intervention.      On exam today, patient is not following commands or tracking provider. Increased tone in neck and all four extremities.      Evaluation Summarized    MRI/Head CT MRI brain:   1.  Focal diffusion restriction centered at the left frontal horn with surrounding vasogenic edema. In the setting of sepsis, this raises concern for cerebral abscess. Alternatively, neoplastic process would be a consideration.  2.  Focal areas of diffusion restriction within the occipital horns, concerning for pus/ventriculitis. Alternatively this could represent hemorrhage.  3.  Mild asymmetric enlargement of the left lateral ventricle with material at the foramen Monro, consistent with mild hydrocephalus.  4.  Consider spinal fluid sampling. Postcontrast imaging including susceptibility weighted imaging would be helpful for further evaluation.     Echocardiogram TTE: EF 65-70%, mild concentric LVH, RV normal, LA is not well visualized, RA is not well visualized    EKG/Telemetry Pending    Other " "Testing Routine EEG 7/26/22:   This is an abnormal EEG during wakefulness and drowsiness due to generalized slowing and more severe left frontotemporal region slowing with intermittent sharp wave activity.  EEG is suggestive of left frontotemporal cerebral dysfunction and epilepsy.  EEG is also suggestive of an ongoing encephalopathy.     LDL  No lab value available in past 30 days   A1C  7/19/2022: 5.6 %   Troponin No lab value available in past 48 hrs       Impression  Cerebral abscess     Recommendations  - Neurochecks and Vital Signs every 4 hours   - MRI Brain with and without contrast  - Continuous vEEG  - Telemetry, EKG  - A1c, Lipid Panel, Troponin x 3  - Euthermia, Euglycemia    Patient Follow-up    - final recommendation pending work-up    Thank you for this consult. We will continue to follow.     DANIELLE Serna, CNP  Vascular Neurology  To page me or covering stroke neurology team member, click here: AMCOM   Choose \"On Call\" tab at top, then search dropdown box for \"Neurology Adult\", select location, press Enter, then look for stroke/neuro ICU/telestroke.    _____________________________________________________    Clinically Significant Risk Factors Present on Admission             # Hypoalbuminemia: Albumin = 2.6 g/dL (Ref range: 3.4 - 5.0 g/dL) on admission, will monitor as appropriate         Past Medical History   Past Medical History:   Diagnosis Date     Aspiration pneumonia (H) 02/2022     Depression      High cholesterol      Migraines      Pyloric ulcer, chronic      Sepsis (H) 08/10/2020     Trigeminal neuralgia      Past Surgical History   Past Surgical History:   Procedure Laterality Date     HYSTERECTOMY       PICC TRIPLE LUMEN PLACEMENT  7/22/2022          UT ESOPHAGOGASTRODUODENOSCOPY TRANSORAL DIAGNOSTIC N/A 8/13/2020    Procedure: ESOPHAGOGASTRODUODENOSCOPY (EGD);  Surgeon: Nathan Smalls MD;  Location: [facility];  Service: Gastroenterology     UT " ESOPHAGOGASTRODUODENOSCOPY TRANSORAL DIAGNOSTIC N/A 8/14/2020    Procedure: ESOPHAGOGASTRODUODENOSCOPY (EGD), PYLORIC DILATION;  Surgeon: Nathan Smalls MD;  Location: [facility];  Service: Gastroenterology     [facility] COLONOSCOPY W/WO BRUSH/WASH N/A 8/13/2020    Procedure: COLONOSCOPY WITH POLYPECTOMY;  Surgeon: Nathan Smalls MD;  Location: [facility];  Service: Gastroenterology     Medications   Home Meds  Prior to Admission medications    Medication Sig Start Date End Date Taking? Authorizing Provider   acetaminophen-codeine (TYLENOL #3) 300-30 MG tablet TAKE 1 TABLET BY MOUTH EVERY 6 HOURS AS NEEDED FOR PAIN 7/19/22  Yes Mara Murillo MD   ARTIFICIAL TEARS 1.4 % ophthalmic solution INSTILL 2 DROPS IN BOTH EYES AS NEEDED 5/26/22  Yes Ashwini Butler MD   cholecalciferol, vitamin D3, 50 mcg (2,000 unit) Tab [CHOLECALCIFEROL, VITAMIN D3, 50 MCG (2,000 UNIT) TAB] Take 1 tablet (2,000 Units total) by mouth daily. One tab daily 5/4/21  Yes Mara Murillo MD   desonide (DESOWEN) 0.05 % cream [DESONIDE (DESOWEN) 0.05 % CREAM] Apply to affected area 2 times daily  Patient taking differently: Apply topically 2 times daily as needed 5/4/21  Yes Mara Murillo MD   ferrous sulfate 325 (65 FE) MG tablet [FERROUS SULFATE 325 (65 FE) MG TABLET] Take 1 tablet (325 mg total) by mouth daily with breakfast. 5/4/21  Yes Mara Murillo MD   levETIRAcetam (KEPPRA) 500 MG tablet Take 1 tablet (500 mg) by mouth 2 times daily 7/1/22  Yes Mara Murillo MD   mirtazapine (REMERON) 15 MG tablet Take 1 tablet (15 mg) by mouth At Bedtime 5/19/22  Yes Mara Murillo MD   multivitamin (DAILY-DAVID) per tablet [MULTIVITAMIN (DAILY-DAVID) PER TABLET] Take 1 tablet by mouth daily. 5/4/21  Yes Mara Murillo MD   nortriptyline (PAMELOR) 25 MG capsule Take 50 mg by mouth 2 times daily 4/25/22  Yes Unknown, Entered By History   omeprazole (PRILOSEC) 40 MG DR capsule Take 1 capsule (40 mg) by mouth daily 5/25/22  Yes Mara Murillo,  MD   OXcarbazepine (TRILEPTAL) 150 MG tablet TAKE 3 TABLETS(450 MG) BY MOUTH AT BEDTIME 7/19/22  Yes Mara Murillo MD   senna-docusate (SENOKOT-S/PERICOLACE) 8.6-50 MG tablet Take 1 tablet by mouth At Bedtime 2/12/22  Yes Michael Hernandez MD   topiramate (TOPAMAX) 50 MG tablet Take 1 tablet (50 mg) by mouth At Bedtime 4/28/22  Yes Mara Murillo MD   nortriptyline (PAMELOR) 50 MG capsule Take 1 capsule (50 mg) by mouth 2 times daily  Patient not taking: No sig reported 2/17/22 5/18/22  Mara Murillo MD   OXcarbazepine (TRILEPTAL) 150 MG tablet Take 3 tablets (450 mg) by mouth At Bedtime 7/27/22   Gemini Montenegro MD   polyvinyl alcohol (ARTIFICIAL TEARS) 1.4 % ophthalmic solution Place 1 drop into both eyes every hour as needed for dry eyes 7/27/22   Gemini Montenegro MD       Scheduled Meds    ceFEPIme (MAXIPIME) IV  2 g Intravenous Q8H     levETIRAcetam  1,000 mg Intravenous Q12H     metroNIDAZOLE  500 mg Intravenous Q8H     OXcarbazepine  450 mg Oral At Bedtime     pantoprazole (PROTONIX) IV  40 mg Intravenous Daily with breakfast     topiramate  50 mg Oral At Bedtime     vancomycin  1,000 mg Intravenous Q12H       Infusion Meds    - MEDICATION INSTRUCTIONS -       sodium chloride 10 mL/hr at 07/28/22 0817       PRN Meds  acetaminophen **OR** acetaminophen, glucose **OR** dextrose **OR** glucagon, guaiFENesin, ipratropium - albuterol 0.5 mg/2.5 mg/3 mL, - MEDICATION INSTRUCTIONS -, ondansetron **OR** ondansetron, prochlorperazine **OR** prochlorperazine **OR** prochlorperazine    Allergies   Allergies   Allergen Reactions     Contrast [Iohexol] Rash     Noticed during 08/2020 admission. Received IV contrast on 8/5     Chocolate Headache     Penicillins Rash     Family History   Family History   Problem Relation Age of Onset     Cancer Father      Testicular cancer Grandchild 17.00     Breast Cancer Mother      Breast Cancer Sister      Breast Cancer Maternal Aunt      Breast Cancer Sister      Social History  "  Social History     Tobacco Use     Smoking status: Former Smoker     Packs/day: 1.00     Years: 50.00     Pack years: 50.00     Quit date: 2014     Years since quittin.7     Smokeless tobacco: Never Used   Substance Use Topics     Alcohol use: No     Drug use: No       Review of Systems   The 10 point Review of Systems is negative other than noted in the HPI or here.        PHYSICAL EXAMINATION   Temp:  [96.7  F (35.9  C)-100.3  F (37.9  C)] 96.7  F (35.9  C)  Pulse:  [] 101  Resp:  [8-25] 14  BP: (106-145)/(53-75) 142/65  SpO2:  [82 %-100 %] 82 %    General Exam  General:  patient lying in bed without any acute distress    HEENT:  normocephalic/atraumatic  Pulmonary:  no respiratory distress    Neuro Exam  Mental Status: patient alert, eyes open, did not follow commands, only said \"ouch\" to noxious stimuli, did not track provider   Cranial Nerves:  PERRL, face grossly, symmetric  Motor:  no abnormal movements, neck stiffness present, grimaces and says \"ouch\" to noxious stimuli in all 4 extremities   Reflexes:  toes down-going,  increased tone in neck and all four extremities.    Sensory: see motor  Coordination: unable to assess, patient does not follow commands  Station/Gait:  deferred    Dysphagia Screen  Per Nursing        Imaging  I personally reviewed all imaging; relevant findings per HPI.    Labs Data   CBC  Recent Labs   Lab 22  04022  0644   WBC 6.4 6.8 9.9   RBC 4.41 4.65 4.71   HGB 9.1* 9.6* 9.8*   HCT 31.7* 33.7* 33.9*   * 547* 549*     Basic Metabolic Panel   Recent Labs   Lab 22  0408 22  0351 22  2349 22  2103 22  0644     --   --  134  --  131*   POTASSIUM 3.4  --   --  3.6  --  3.5   CHLORIDE 102  --   --  101  --  99   CO2 24  --   --  26  --  23   BUN 13  --   --  14  --  11   CR 0.44*  --   --  0.36*  --  0.54*   GLC 95 86 89 105*   < > 110   ADY 8.4*  --   --  8.3*  --  8.3*    < > = values " in this interval not displayed.     Liver Panel  Recent Labs   Lab 07/27/22  2231 07/27/22  0644 07/23/22  0734   PROTTOTAL 6.4* 6.5 6.0   ALBUMIN 2.6* 2.6* 2.3*   BILITOTAL 0.4 0.3 0.4   ALKPHOS 85 89 71   AST 11 12 30   ALT 16 18 34     INR    Recent Labs   Lab Test 07/27/22  2231 07/19/22  2255 02/07/22  0012   INR 1.13 1.36* 1.05      Lipid Profile    Recent Labs   Lab Test 05/19/22  1430 05/04/21  1205 05/10/18  1225   CHOL 247* 207* 221*   HDL 77 74 75   * 114 125   TRIG 105 93 104     A1C    Recent Labs   Lab Test 07/19/22  1901   A1C 5.6     Troponin I  No results for input(s): TROPONINIS, TROPONINI, GHTROP in the last 168 hours.       Stroke Consult Data Data   This was a non-emergent, non-telestroke consult.  Billing: I personally examined and evaluated the patient today. At the time of my evaluation and management the patient was critical condition today due to cerebral abscess/crittically ill. I spent a total of 65 minutes providing critical care services, evaluating the patient, directing care and reviewing laboratory values and radiologic reports.

## 2022-07-28 NOTE — PLAN OF CARE
Goal Outcome Evaluation:          Overall Patient Progress: declining    Outcome Evaluation: NPO, inadequate intake x 7-10 days prior to admit.  May benefit from FT if unable to advance diet and consume at least 50-75% meals in the next 24-48 hours.    Nubia Ruiz RD, LD, [facility]   Clinical Dietitian - [facility]

## 2022-07-28 NOTE — PHARMACY-ADMISSION MEDICATION HISTORY
Admission medication history completed at [facility]. Please see Pharmacy - Admission Medication History note from 07/20/2022.

## 2022-07-28 NOTE — PROGRESS NOTES
Patient Name: Julisa Chaney   MRN: [id number]   Admit Date: 7/27/2022    Current Infection Status: COVID-19, MRSA   Current Isolation Status: Special Precautions, Contact     Review of COVID-19 status and required isolation precautions    Patient tested positive on 7/19/22.  The patient will have a 10 day infectious window, due to it not being a severe case, and not being immunocompromised. Day 10 is 7/30/22.      Refer to Special Precautions Duration and Period of Transmissibility Guideline, in Policy Tech.        Involved parties: Seema Vázquez, Infection Prevention and None.    Criteria used to make decision: Lab Dates: 7/19/22.    The involved parties have reviewed this patient's chart per the Special Precautions Duration and Period of Transmissibility guideline and have taken the following action:     DECISION - OPTION 2: The patient does not qualify for Special Precautions isolation discontinuation due to: Immunocompetent Asymptomatic: 10 days from positive test AND no COVID-19 symptom development. The patient's infection and isolation status can be reviewed by date of: 7/30/22.       Seema Vázquez, Infection Prevention

## 2022-07-28 NOTE — PLAN OF CARE
Physical Therapy Discharge Summary    Reason for therapy discharge:    Discharged to [facility].     Progress towards therapy goal(s). See goals on Care Plan in [facility] electronic health record for goal details.  Goals not met.  Barriers to achieving goals:   discharge from facility.    Therapy recommendation(s):    Continued therapy is recommended.  Rationale/Recommendations:  to improve mobility and strength .

## 2022-07-28 NOTE — PROGRESS NOTES
[facility]    Medicine Progress Note - Hospitalist Service    Date of Admission:  7/27/2022    Assessment & Plan          Julisa Chaney is a pleasant 77-year-old female with trigeminal neuralgia who presented to [facility] on 7/19 due to severe sepsis (leukocytosis, lactic acidosis, elevated procalcitonin).  She was found to have COVID-19 with superimposed bacterial pneumonia.  She was treated with IV vancomycin and IV meropenem.  Her hospitalization was complicated by severe metabolic encephalopathy.  Head CT on 7/25 showed possible left frontal mass causing vasogenic edema.  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  She was switched to IV vancomycin, cefepime and metronidazole and transferred to [facility] for neurosurgery assessment.  Her surveillance MRSA nasal swabs were positive.  She was assessed by neurosurgery on admission, no surgical intervention was advised.    #.  Left frontal lobe mass with vasogenic edema-due to abscess versus neoplasm  #.  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  No surgical interventions advised at this time  -Neuro critical care consulted.  MRI with and without contrast video EEG    #.  Acute metabolic encephalopathy  EEG 7/28 showed findings consistent with severe diffuse nonspecific encephalopathy with additional cortical irritability over posterior head region.  No seizures.  -Continue supportive cares    #.  Severe sepsis-due to acute infection-intracerebral abscess with purulent ventriculitis and bacterial pneumonia  -Had presented with lactate of 4, procalcitonin of 2.11, CRP 26, leukocytosis 26.2  -Chest x-ray was  clear but CT abdomen/pelvis showed patchy infiltrate in bibasilar area.  -Treated with IV vancomycin and IV meropenem  -Subsequently CT head and MRI brain showed probable brain abscess with purulent ventriculitis    #.  Acute COVID-19 illness  -Tested positive on 7/19.  Chest x-ray clear  -Is vaccinated  -Treated with 5 days of remdesivir  -Did not receive dexamethasone as she was not hypoxic  -CRP was 28 on 7/21 and subsequently trended down to 5.7  -D-dimer peaked at 1.91 on 7/21 and then trended down to 0.82 on 7/26  -No respiratory symptoms  -Continue COVID-19 precautions  -Was on Lovenox for DVT prophylaxis at [facility].  This is currently held as patient may need LP or biopsy for brain abscess/mass    #.  Probable seizures  -On Keppra PTA-seems this was prescribed for ataxia and tremors in 2/2022 when she was hospitalized for aspiration pneumonia.  -EEG obtained at [facility] indicated possible seizures and left frontal lobe  -Now on Keppra 1000 mg IV twice daily (Keppra level on 7/26 was 6 so her home dose was doubled)  -Video EEG  -Neuro critical care consultation    #.  Hyponatremia, sodium 129  -Nephrology was consulted at [facility].  SIADH was suspected and she was placed on fluid restrictions.  Sodium improved to 133    #.  Chronic iron deficiency anemia  -Hemoglobin stable and at baseline, 9.5  -Monitor    #.  Trigeminal neuralgia  PTA oxcarbazepine, Topamax, continue     #.  Peptic ulcer disease  EGD 2020 showed gastroduodenitis with duodenal stricture  -IV PPI    #.  Hypoalbuminemia, albumin 2.6       Diet: NPO for Medical/Clinical Reasons Except for: Meds, Ice Chips    DVT Prophylaxis: Pneumatic Compression Devices  Abrams Catheter: PRESENT, indication: Strict 1-2 Hour I&O;Retention  Central Lines: PRESENT  PICC Triple Lumen 07/22/22 Right Basilic Vascular access-Site Assessment: WDL  Cardiac Monitoring: ACTIVE order. Indication: ICU  Code Status: Full Code      Disposition Plan         The  patient's care was discussed with the Bedside Nurse.    Samanta Rhodes MD  Hospitalist Service  [facility]  Securely message with the [employer] Web Console (learn more here)  Text page via [employer] Paging/Directory         Clinically Significant Risk Factors Present on Admission             # Hypoalbuminemia: Albumin = 2.6 g/dL (Ref range: 3.4 - 5.0 g/dL) on admission, will monitor as appropriate          # Severe Malnutrition: based on nutrition assessment     ______________________________________________________________________    Interval History   Patient remains unresponsive.  Discussed with nursing, did take some applesauce this morning  Low-grade temp of 100 degrees, mild tachycardia.  Blood pressure stable  Labs overall improved from before      Data reviewed today: I reviewed all medications, new labs and imaging results over the last 24 hours. I personally reviewed no images or EKG's today.    Physical Exam   Vital Signs: Temp: 100  F (37.8  C) Temp src: Axillary BP: 123/59 Pulse: 115   Resp: 8 SpO2: 96 % O2 Device: None (Room air)    Weight: 109 lbs 2.04 oz    Constitutional-somnolent, arouses briefly, appears comfortable   Cardiovascular-regular rhythm, mild tachycardia, no edema  Pulmonary-lungs are clear to auscultation bilaterally, no wheezing or rhonchi  GI-abdomen is soft, nontender, nondistended, no hepatosplenomegaly or masses  Integumentary-skin is warm and dry, no rashes or ulcers  Neurological-not awake or alert, moves all 4 extremities       Data   Recent Labs   Lab 07/28/22  0408 07/28/22  0351 07/27/22  2349 07/27/22  2231 07/27/22  2103 07/27/22  0644   WBC 6.4  --   --  6.8  --  9.9   HGB 9.1*  --   --  9.6*  --  9.8*   MCV 72*  --   --  73*  --  72*   *  --   --  547*  --  549*   INR  --   --   --  1.13  --   --      --   --  134  --  131*   POTASSIUM 3.4  --   --  3.6  --  3.5   CHLORIDE 102  --   --  101  --  99   CO2 24  --   --  26  --  23   BUN 13   --   --  14  --  11   CR 0.44*  --   --  0.36*  --  0.54*   ANIONGAP 7  --   --  7  --  9   ADY 8.4*  --   --  8.3*  --  8.3*   GLC 95 86 89 105*   < > 110   ALBUMIN  --   --   --  2.6*  --  2.6*   PROTTOTAL  --   --   --  6.4*  --  6.5   BILITOTAL  --   --   --  0.4  --  0.3   ALKPHOS  --   --   --  85  --  89   ALT  --   --   --  16  --  18   AST  --   --   --  11  --  12    < > = values in this interval not displayed.     No results found for this or any previous visit (from the past 24 hour(s)).  Medications     - MEDICATION INSTRUCTIONS -       sodium chloride 10 mL/hr at 07/28/22 0817       cefTRIAXone  2 g Intravenous Q12H     levETIRAcetam  1,000 mg Intravenous Q12H     metroNIDAZOLE  500 mg Intravenous Q8H     OXcarbazepine  450 mg Oral At Bedtime     pantoprazole (PROTONIX) IV  40 mg Intravenous Daily with breakfast     topiramate  50 mg Oral At Bedtime     vancomycin  1,000 mg Intravenous Q12H

## 2022-07-28 NOTE — PLAN OF CARE
Room Air  No critical continuous drips  Q4H Neuro assessment    Events from past 12H:  Seen by Neuro Critical Care and Hospitalist  Continuous EEG established  MRI ordered, awaiting MRI scanner availability  Transfer orders placed, awaiting bed            Problem: Risk for Delirium  Goal: Optimal Coping  Outcome: Ongoing, Not Progressing  Goal: Improved Behavioral Control  Outcome: Ongoing, Not Progressing  Goal: Improved Attention and Thought Clarity  Outcome: Ongoing, Not Progressing     Problem: Plan of Care - These are the overarching goals to be used throughout the patient stay.    Goal: Optimal Comfort and Wellbeing  Outcome: Ongoing, Progressing  Intervention: Provide Person-Centered Care  Recent Flowsheet Documentation  Taken 7/28/2022 1600 by Jackie Souza RN  Trust Relationship/Rapport:   care explained   reassurance provided   thoughts/feelings acknowledged  Taken 7/28/2022 1200 by Jackie Souza RN  Trust Relationship/Rapport:   care explained   reassurance provided   thoughts/feelings acknowledged  Taken 7/28/2022 0705 by Jackie Souza RN  Trust Relationship/Rapport:   care explained   reassurance provided   thoughts/feelings acknowledged  Goal: Readiness for Transition of Care  Outcome: Ongoing, Progressing     Problem: Risk for Delirium  Goal: Improved Sleep  Outcome: Ongoing, Progressing     Problem: Plan of Care - These are the overarching goals to be used throughout the patient stay.    Goal: Absence of Hospital-Acquired Illness or Injury  Intervention: Identify and Manage Fall Risk  Recent Flowsheet Documentation  Taken 7/28/2022 1600 by Jackie Souza RN  Safety Promotion/Fall Prevention:   activity supervised   assistive device/personal items within reach   bed alarm on   fall prevention program maintained   clutter free environment maintained   increased rounding and observation   increase visualization of patient   lighting adjusted   room near nurse's station   room organization  consistent   safety round/check completed   supervised activity  Taken 7/28/2022 1200 by Jackie Souza RN  Safety Promotion/Fall Prevention:   activity supervised   assistive device/personal items within reach   bed alarm on   fall prevention program maintained   clutter free environment maintained   increased rounding and observation   increase visualization of patient   lighting adjusted   room near nurse's station   room organization consistent   safety round/check completed   supervised activity  Taken 7/28/2022 0705 by Jackie Souza RN  Safety Promotion/Fall Prevention:   activity supervised   assistive device/personal items within reach   bed alarm on   fall prevention program maintained   clutter free environment maintained   increased rounding and observation   increase visualization of patient   lighting adjusted   room near nurse's station   room organization consistent   safety round/check completed   supervised activity  Intervention: Prevent Skin Injury  Recent Flowsheet Documentation  Taken 7/28/2022 1800 by Jackie Souza RN  Body Position:   turned   left  Taken 7/28/2022 1600 by Jackie Souza, RN  Body Position:   turned   right  Taken 7/28/2022 1400 by Jackie Souza, RN  Body Position:   turned   left  Taken 7/28/2022 1200 by Jackie Souza, RN  Body Position:   turned   right  Taken 7/28/2022 1000 by Jackie Souza RN  Body Position: position changed independently  Taken 7/28/2022 0705 by Jackie Souza RN  Body Position: position changed independently  Intervention: Prevent and Manage VTE (Venous Thromboembolism) Risk  Recent Flowsheet Documentation  Taken 7/28/2022 1600 by Jackie Souza RN  VTE Prevention/Management: SCDs (sequential compression devices) on  Activity Management:   bedrest   activity adjusted per tolerance  Taken 7/28/2022 1200 by Jackie Souza RN  VTE Prevention/Management: SCDs (sequential compression devices) on  Activity Management:    bedrest   activity adjusted per tolerance  Taken 7/28/2022 0705 by Jackie Souza RN  VTE Prevention/Management: SCDs (sequential compression devices) on  Activity Management:   bedrest   activity adjusted per tolerance     Problem: Plan of Care - These are the overarching goals to be used throughout the patient stay.    Goal: Absence of Hospital-Acquired Illness or Injury  Intervention: Prevent Skin Injury  Recent Flowsheet Documentation  Taken 7/28/2022 1800 by Jackie Souza RN  Body Position:   turned   left  Taken 7/28/2022 1600 by Jackie Souza RN  Body Position:   turned   right  Taken 7/28/2022 1400 by Jackie Souza RN  Body Position:   turned   left  Taken 7/28/2022 1200 by Jackie Souza RN  Body Position:   turned   right  Taken 7/28/2022 1000 by Jackie Souza RN  Body Position: position changed independently  Taken 7/28/2022 0705 by Jackie Souza RN  Body Position: position changed independently   Goal Outcome Evaluation:

## 2022-07-29 ENCOUNTER — HOSPITAL ENCOUNTER (INPATIENT)
Dept: NEUROLOGY | Facility: CLINIC | Age: 77
Discharge: HOME | DRG: 023 | End: 2022-07-29
Attending: INTERNAL MEDICINE | Admitting: HOSPITALIST
Payer: COMMERCIAL

## 2022-07-29 LAB
ANION GAP SERPL CALCULATED.3IONS-SCNC: 9 MMOL/L (ref 3–14)
APTT PPP: 33 SECONDS (ref 22–38)
BUN SERPL-MCNC: 11 MG/DL (ref 7–30)
CALCIUM SERPL-MCNC: 8.4 MG/DL (ref 8.5–10.1)
CHLORIDE BLD-SCNC: 98 MMOL/L (ref 94–109)
CO2 SERPL-SCNC: 25 MMOL/L (ref 20–32)
CREAT SERPL-MCNC: 0.42 MG/DL (ref 0.52–1.04)
CRP SERPL-MCNC: 48.7 MG/L (ref 0–8)
D DIMER PPP FEU-MCNC: 0.47 UG/ML FEU (ref 0–0.5)
ERYTHROCYTE [DISTWIDTH] IN BLOOD BY AUTOMATED COUNT: 18.4 % (ref 10–15)
GFR SERPL CREATININE-BSD FRML MDRD: >90 ML/MIN/1.73M2
GLUCOSE BLD-MCNC: 100 MG/DL (ref 70–99)
HCT VFR BLD AUTO: 32.8 % (ref 35–47)
HGB BLD-MCNC: 9.3 G/DL (ref 11.7–15.7)
INR PPP: 1.34 (ref 0.85–1.15)
MCH RBC QN AUTO: 20.6 PG (ref 26.5–33)
MCHC RBC AUTO-ENTMCNC: 28.4 G/DL (ref 31.5–36.5)
MCV RBC AUTO: 73 FL (ref 78–100)
PLATELET # BLD AUTO: 578 10E3/UL (ref 150–450)
POTASSIUM BLD-SCNC: 2.8 MMOL/L (ref 3.4–5.3)
POTASSIUM BLD-SCNC: 3.5 MMOL/L (ref 3.4–5.3)
RBC # BLD AUTO: 4.52 10E6/UL (ref 3.8–5.2)
SODIUM SERPL-SCNC: 132 MMOL/L (ref 133–144)
WBC # BLD AUTO: 8.3 10E3/UL (ref 4–11)

## 2022-07-29 PROCEDURE — 999N000190 HC STATISTIC VAT ROUNDS

## 2022-07-29 PROCEDURE — 95714 VEEG EA 12-26 HR UNMNTR: CPT

## 2022-07-29 PROCEDURE — 85730 THROMBOPLASTIN TIME PARTIAL: CPT | Performed by: INTERNAL MEDICINE

## 2022-07-29 PROCEDURE — 99232 SBSQ HOSP IP/OBS MODERATE 35: CPT | Performed by: INTERNAL MEDICINE

## 2022-07-29 PROCEDURE — 36415 COLL VENOUS BLD VENIPUNCTURE: CPT | Performed by: INTERNAL MEDICINE

## 2022-07-29 PROCEDURE — 84132 ASSAY OF SERUM POTASSIUM: CPT | Performed by: HOSPITALIST

## 2022-07-29 PROCEDURE — 80048 BASIC METABOLIC PNL TOTAL CA: CPT | Performed by: INTERNAL MEDICINE

## 2022-07-29 PROCEDURE — 250N000011 HC RX IP 250 OP 636: Performed by: INTERNAL MEDICINE

## 2022-07-29 PROCEDURE — 258N000003 HC RX IP 258 OP 636: Performed by: INTERNAL MEDICINE

## 2022-07-29 PROCEDURE — 250N000011 HC RX IP 250 OP 636: Performed by: HOSPITALIST

## 2022-07-29 PROCEDURE — 99233 SBSQ HOSP IP/OBS HIGH 50: CPT | Performed by: INTERNAL MEDICINE

## 2022-07-29 PROCEDURE — 85610 PROTHROMBIN TIME: CPT | Performed by: INTERNAL MEDICINE

## 2022-07-29 PROCEDURE — 86140 C-REACTIVE PROTEIN: CPT | Performed by: INTERNAL MEDICINE

## 2022-07-29 PROCEDURE — 95720 EEG PHY/QHP EA INCR W/VEEG: CPT | Performed by: PSYCHIATRY & NEUROLOGY

## 2022-07-29 PROCEDURE — 999N000040 HC STATISTIC CONSULT NO CHARGE VASC ACCESS

## 2022-07-29 PROCEDURE — 120N000001 HC R&B MED SURG/OB

## 2022-07-29 PROCEDURE — 250N000013 HC RX MED GY IP 250 OP 250 PS 637: Performed by: INTERNAL MEDICINE

## 2022-07-29 PROCEDURE — 85027 COMPLETE CBC AUTOMATED: CPT | Performed by: INTERNAL MEDICINE

## 2022-07-29 PROCEDURE — C9113 INJ PANTOPRAZOLE SODIUM, VIA: HCPCS | Performed by: INTERNAL MEDICINE

## 2022-07-29 PROCEDURE — 85379 FIBRIN DEGRADATION QUANT: CPT | Performed by: INTERNAL MEDICINE

## 2022-07-29 PROCEDURE — 99232 SBSQ HOSP IP/OBS MODERATE 35: CPT | Mod: 25 | Performed by: PSYCHIATRY & NEUROLOGY

## 2022-07-29 RX ORDER — NALOXONE HYDROCHLORIDE 0.4 MG/ML
0.2 INJECTION, SOLUTION INTRAMUSCULAR; INTRAVENOUS; SUBCUTANEOUS
Status: DISCONTINUED | OUTPATIENT
Start: 2022-07-29 | End: 2022-10-04 | Stop reason: HOSPADM

## 2022-07-29 RX ORDER — NALOXONE HYDROCHLORIDE 0.4 MG/ML
0.4 INJECTION, SOLUTION INTRAMUSCULAR; INTRAVENOUS; SUBCUTANEOUS
Status: DISCONTINUED | OUTPATIENT
Start: 2022-07-29 | End: 2022-10-04 | Stop reason: HOSPADM

## 2022-07-29 RX ORDER — SODIUM CHLORIDE AND POTASSIUM CHLORIDE 150; 900 MG/100ML; MG/100ML
INJECTION, SOLUTION INTRAVENOUS CONTINUOUS
Status: DISCONTINUED | OUTPATIENT
Start: 2022-07-29 | End: 2022-08-01

## 2022-07-29 RX ORDER — POTASSIUM CHLORIDE 7.45 MG/ML
10 INJECTION INTRAVENOUS
Status: COMPLETED | OUTPATIENT
Start: 2022-07-29 | End: 2022-07-29

## 2022-07-29 RX ORDER — MORPHINE SULFATE 4 MG/ML
2 INJECTION, SOLUTION INTRAMUSCULAR; INTRAVENOUS
Status: DISCONTINUED | OUTPATIENT
Start: 2022-07-29 | End: 2022-08-23

## 2022-07-29 RX ADMIN — MORPHINE SULFATE 2 MG: 4 INJECTION, SOLUTION INTRAMUSCULAR; INTRAVENOUS at 21:52

## 2022-07-29 RX ADMIN — SODIUM CHLORIDE: 9 INJECTION, SOLUTION INTRAVENOUS at 12:23

## 2022-07-29 RX ADMIN — LEVETIRACETAM 1000 MG: 10 INJECTION INTRAVENOUS at 10:54

## 2022-07-29 RX ADMIN — CEFTRIAXONE SODIUM 2 G: 2 INJECTION, POWDER, FOR SOLUTION INTRAMUSCULAR; INTRAVENOUS at 05:41

## 2022-07-29 RX ADMIN — POTASSIUM CHLORIDE 10 MEQ: 7.46 INJECTION, SOLUTION INTRAVENOUS at 06:55

## 2022-07-29 RX ADMIN — POTASSIUM CHLORIDE 10 MEQ: 7.46 INJECTION, SOLUTION INTRAVENOUS at 10:53

## 2022-07-29 RX ADMIN — ACETAMINOPHEN 650 MG: 650 SUPPOSITORY RECTAL at 00:00

## 2022-07-29 RX ADMIN — METRONIDAZOLE 500 MG: 500 INJECTION, SOLUTION INTRAVENOUS at 08:42

## 2022-07-29 RX ADMIN — POTASSIUM CHLORIDE 10 MEQ: 7.46 INJECTION, SOLUTION INTRAVENOUS at 08:40

## 2022-07-29 RX ADMIN — CEFTRIAXONE SODIUM 2 G: 2 INJECTION, POWDER, FOR SOLUTION INTRAMUSCULAR; INTRAVENOUS at 16:26

## 2022-07-29 RX ADMIN — METRONIDAZOLE 500 MG: 500 INJECTION, SOLUTION INTRAVENOUS at 17:03

## 2022-07-29 RX ADMIN — POTASSIUM CHLORIDE 10 MEQ: 7.46 INJECTION, SOLUTION INTRAVENOUS at 09:50

## 2022-07-29 RX ADMIN — METRONIDAZOLE 500 MG: 500 INJECTION, SOLUTION INTRAVENOUS at 00:00

## 2022-07-29 RX ADMIN — VANCOMYCIN HYDROCHLORIDE 1000 MG: 1 INJECTION, SOLUTION INTRAVENOUS at 05:41

## 2022-07-29 RX ADMIN — PANTOPRAZOLE SODIUM 40 MG: 40 INJECTION, POWDER, FOR SOLUTION INTRAVENOUS at 08:39

## 2022-07-29 RX ADMIN — POTASSIUM CHLORIDE AND SODIUM CHLORIDE: 900; 150 INJECTION, SOLUTION INTRAVENOUS at 18:10

## 2022-07-29 RX ADMIN — ACETAMINOPHEN 650 MG: 650 SUPPOSITORY RECTAL at 19:09

## 2022-07-29 RX ADMIN — VANCOMYCIN HYDROCHLORIDE 1000 MG: 1 INJECTION, SOLUTION INTRAVENOUS at 18:10

## 2022-07-29 RX ADMIN — LEVETIRACETAM 1000 MG: 10 INJECTION INTRAVENOUS at 21:41

## 2022-07-29 ASSESSMENT — VISUAL ACUITY
OD: NOT TESTED
OS: NOT TESTED
OS: NOT TESTED
OD: NOT TESTED

## 2022-07-29 ASSESSMENT — ACTIVITIES OF DAILY LIVING (ADL)
ADLS_ACUITY_SCORE: 35
ADLS_ACUITY_SCORE: 39
ADLS_ACUITY_SCORE: 35
ADLS_ACUITY_SCORE: 35
ADLS_ACUITY_SCORE: 39
ADLS_ACUITY_SCORE: 35
ADLS_ACUITY_SCORE: 39
ADLS_ACUITY_SCORE: 35
ADLS_ACUITY_SCORE: 39

## 2022-07-29 NOTE — PROGRESS NOTES
[facility]    Medicine Progress Note - Hospitalist Service    Date of Admission:  7/27/2022    Assessment & Plan          Julisa Chaney is a pleasant 77-year-old female with trigeminal neuralgia who presented to [facility] on 7/19 due to severe sepsis (leukocytosis, lactic acidosis, elevated procalcitonin).  She was found to have COVID-19 with superimposed bacterial pneumonia.  She was treated with IV vancomycin and IV meropenem.  Her hospitalization was complicated by severe metabolic encephalopathy.  Head CT on 7/25 showed possible left frontal mass causing vasogenic edema.  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  She was switched to IV vancomycin, cefepime and metronidazole and transferred to [facility] for neurosurgery assessment.  Her surveillance MRSA nasal swabs were positive.  She was assessed by neurosurgery on admission, no surgical intervention was advised.    #.  Left frontal lobe mass with vasogenic edema-due to abscess versus neoplasm  #.  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  Awaiting neuro critical care/neurology's recommendations about proceeding with LP or to see if this is contraindicated.  - Continue antibiotics-IV vancomycin + ceftriaxone BID + IV metronidazole  - Every 4 hours neurochecks  - Neurosurgery consulted.  No surgical interventions advised at this time  - Neuro critical care and neurology consulted.  MRI with and without contrast, video EEG    #.  Acute metabolic encephalopathy  EEG 7/28 showed findings consistent with severe diffuse nonspecific encephalopathy with additional cortical irritability over posterior head region.  No seizures.  - Continue supportive cares.  Patient remains obtunded    #.  Severe sepsis - due to acute infection - intracerebral  abscess with purulent ventriculitis and bacterial pneumonia  - Had presented with lactate of 4, procalcitonin of 2.11, CRP 26, leukocytosis 26.2  - Chest x-ray was clear but CT abdomen/pelvis showed patchy infiltrate in bibasilar area.  - Treated with IV vancomycin and IV meropenem at [facility]  - Subsequently had CT head and MRI brain that showed probable brain abscess with purulent ventriculitis  -Currently on -IV vancomycin + ceftriaxone BID + IV metronidazole  -Leukocytosis has resolved.  Elevated CRP of 49    #.  Acute COVID-19 illness  -Tested positive on 7/19.  Chest x-ray clear  -Is vaccinated  -Treated with 5 days of remdesivir  -Did not receive dexamethasone as she was not hypoxic  -CRP was 28 on 7/21 and subsequently trended down to 5.7  -D-dimer peaked at 1.91 on 7/21 and then trended down to 0.82 on 7/26  -No respiratory symptoms  -Continue COVID-19 precautions  -Was on Lovenox for DVT prophylaxis at [facility].  This is currently held as patient may need LP or biopsy for brain abscess/mass    #.  Probable seizures  -On Keppra PTA-seems this was prescribed for ataxia and tremors in 2/2022 when she was hospitalized for aspiration pneumonia.  -EEG obtained at [facility] indicated possible seizures and left frontal lobe  -Now on Keppra 1000 mg IV twice daily (Keppra level on 7/26 was 6 so her home dose was doubled)  -Video EEG 7/28 showed findings consistent with severe diffuse nonspecific encephalopathy with additional cortical irritability over posterior head region.  No seizures.  -Consult neurology     #.  Hyponatremia, sodium 129  -Nephrology was consulted at [facility].  SIADH was suspected and she was placed on fluid restrictions.  Sodium improved to 132    #.  Chronic iron deficiency anemia  -Hemoglobin stable and at baseline, 9.5  -Monitor    #.  Trigeminal neuralgia  PTA oxcarbazepine, Topamax currently on hold as patient is obtunded    #.  Peptic ulcer disease  EGD 2020 showed  gastroduodenitis with duodenal stricture  -IV PPI    #.  Hypoalbuminemia, albumin 2.6    #.  Hypokalemia, potassium 2.8  -On replacement    #. FEN -   -Obtunded and unable to take p.o.  -Receiving IV fluids with antibiotics and medicines  -We will start on NS with 20 mEq KCl at 50 mL/h  -May need to put in feeding tube and start tube feeds    # Severe Malnutrition: based on nutrition assessment       Diet:      DVT Prophylaxis: Pneumatic Compression Devices  Abrams Catheter: PRESENT, indication: Retention  Central Lines: PRESENT  PICC Triple Lumen 07/22/22 Right Basilic Vascular access-Site Assessment: WDL  Cardiac Monitoring: ACTIVE order. Indication: ICU  Code Status: Full Code      Disposition Plan      Expected Discharge Date: 08/02/2022              The patient's care was discussed with the Bedside Nurse.    Samanta Rhodes MD  Hospitalist Service  [facility]  Securely message with the Vocera Web Console (learn more here)  Text page via [employer] Paging/Directory         Clinically Significant Risk Factors Present on Admission                      ______________________________________________________________________    Interval History   Patient remains unresponsive.    Afebrile, vital signs stable   No improvement in mental status       Data reviewed today: I reviewed all medications, new labs and imaging results over the last 24 hours. I personally reviewed no images or EKG's today.    Physical Exam   Vital Signs: Temp: 98.5  F (36.9  C) Temp src: Oral BP: 118/69 Pulse: 99   Resp: 16 SpO2: 96 % O2 Device: None (Room air)    Weight: 109 lbs 2.04 oz    Constitutional-obtunded, does not respond, appears comfortable   Cardiovascular-regular rate and rhythm, no edema  Pulmonary-lungs are clear to auscultation bilaterally, no wheezing or rhonchi  GI-abdomen is soft, nontender, nondistended, no hepatosplenomegaly or masses  Integumentary-skin is warm and dry, no rashes or  ulcers  Neurological-not awake or alert       Data   Recent Labs   Lab 07/29/22  1414 07/29/22  0541 07/28/22  0408 07/28/22  0351 07/27/22  2349 07/27/22  2231 07/27/22  2103 07/27/22  0644   WBC  --  8.3 6.4  --   --  6.8  --  9.9   HGB  --  9.3* 9.1*  --   --  9.6*  --  9.8*   MCV  --  73* 72*  --   --  73*  --  72*   PLT  --  578* 461*  --   --  547*  --  549*   INR 1.34*  --   --   --   --  1.13  --   --    NA  --  132* 133  --   --  134  --  131*   POTASSIUM 3.5 2.8* 3.4  --   --  3.6  --  3.5   CHLORIDE  --  98 102  --   --  101  --  99   CO2  --  25 24  --   --  26  --  23   BUN  --  11 13  --   --  14  --  11   CR  --  0.42* 0.44*  --   --  0.36*  --  0.54*   ANIONGAP  --  9 7  --   --  7  --  9   ADY  --  8.4* 8.4*  --   --  8.3*  --  8.3*   GLC  --  100* 95 86   < > 105*   < > 110   ALBUMIN  --   --   --   --   --  2.6*  --  2.6*   PROTTOTAL  --   --   --   --   --  6.4*  --  6.5   BILITOTAL  --   --   --   --   --  0.4  --  0.3   ALKPHOS  --   --   --   --   --  85  --  89   ALT  --   --   --   --   --  16  --  18   AST  --   --   --   --   --  11  --  12    < > = values in this interval not displayed.     No results found for this or any previous visit (from the past 24 hour(s)).  Medications     - MEDICATION INSTRUCTIONS -       sodium chloride 10 mL/hr at 07/29/22 1241       cefTRIAXone  2 g Intravenous Q12H     levETIRAcetam  1,000 mg Intravenous Q12H     metroNIDAZOLE  500 mg Intravenous Q8H     OXcarbazepine  450 mg Oral At Bedtime     pantoprazole (PROTONIX) IV  40 mg Intravenous Daily with breakfast     topiramate  50 mg Oral At Bedtime     vancomycin  1,000 mg Intravenous Q12H

## 2022-07-29 NOTE — CONSULTS
[facility]    Neurocritical care/Stroke Progress Note    Interval EventsTransferred to the floor from ICU. General neurology was consulted. Neurocritical care will sign off. MRI brain w/wo contrast and LP pending.     HPI Summary  Julisa Chaney is a 77 year old female with pertinent past medical history of trigeminal neuralgia, chronic pain, migraines, HLD, hypercalcemia, iron deficiency anemia, depression, pyloric ulcer.    She presented to [facility] ED 7/19/22 with general myalgias, chest pain, fever, diarrhea, encephalopathy. Found to have sepsis, COVID-19, and cerebral abscess/small mass lesion. EEG with L frontotemporal dysfunction. Neurosurgery recommended transfer to [facility] for formal consult and plan for LP.    Evaluation Summarized     MRI/Head CT MRI brain w/wo contrast: pending  MRI brain:   1.  Focal diffusion restriction centered at the left frontal horn with surrounding vasogenic edema. In the setting of sepsis, this raises concern for cerebral abscess. Alternatively, neoplastic process would be a consideration.  2.  Focal areas of diffusion restriction within the occipital horns, concerning for pus/ventriculitis. Alternatively this could represent hemorrhage.  3.  Mild asymmetric enlargement of the left lateral ventricle with material at the foramen Monro, consistent with mild hydrocephalus.  4.  Consider spinal fluid sampling. Postcontrast imaging including susceptibility weighted imaging would be helpful for further evaluation.      Echocardiogram TTE: EF 65-70%, mild concentric LVH, RV normal, LA is not well visualized, RA is not well visualized    EKG/Telemetry Pending    Other Testing Routine EEG 7/26/22:   This is an abnormal EEG during wakefulness and drowsiness due to generalized slowing and more severe left frontotemporal region slowing with intermittent sharp wave activity.  EEG is suggestive of left frontotemporal cerebral dysfunction and epilepsy.  EEG is  "also suggestive of an ongoing encephalopathy.      LDL  No lab value available in past 30 days   A1C  7/19/2022: 5.6 %   Troponin No lab value available in past 48 hrs         Impression  Ongoing severe encephalopathy in setting of sepsis, COVID-19, Cerebral abscess/small mass lesion    Plan  -transferred to the floor from ICU  -continues on vEEG, general neurology was contacted  -repeat MRI brain w/wo contrast pending, LP pending    Patient Follow-up   Neurocritical care will sign off, defer further work-up to general neurology.    Radhika Moreno PA-C  Vascular Neurology  To page me or covering stroke neurology team member, click here: [email or web address]   Choose \"On Call\" tab at top, then search dropdown box for \"Neurology Adult\", select location, press Enter, then look for stroke/neuro ICU/telestroke.    ______________________________________________________    Clinically Significant Risk Factors Present on Admission   # Severe Malnutrition: based on nutrition assessment      Medications   Scheduled Meds    cefTRIAXone  2 g Intravenous Q12H     levETIRAcetam  1,000 mg Intravenous Q12H     metroNIDAZOLE  500 mg Intravenous Q8H     OXcarbazepine  450 mg Oral At Bedtime     pantoprazole (PROTONIX) IV  40 mg Intravenous Daily with breakfast     potassium chloride  10 mEq Intravenous Q1H     topiramate  50 mg Oral At Bedtime     vancomycin  1,000 mg Intravenous Q12H       Infusion Meds    - MEDICATION INSTRUCTIONS -       sodium chloride 10 mL/hr at 07/28/22 0817       PRN Meds  acetaminophen **OR** acetaminophen, glucose **OR** dextrose **OR** glucagon, guaiFENesin, ipratropium - albuterol 0.5 mg/2.5 mg/3 mL, - MEDICATION INSTRUCTIONS -, ondansetron **OR** ondansetron, prochlorperazine **OR** prochlorperazine **OR** prochlorperazine       PHYSICAL EXAMINATION  Temp:  [100  F (37.8  C)] 100  F (37.8  C)  Pulse:  [101-115] 102  Resp:  [8-28] 20  BP: (111-155)/(54-89) 135/65  SpO2:  [82 %-97 %] 96 %      Peripheral, sign off note " (general neurology was consulted)    Imaging  I personally reviewed all imaging; relevant findings per HPI.     Lab Results Data   CBC  Recent Labs   Lab 07/29/22  0541 07/28/22  0408 07/27/22 2231   WBC 8.3 6.4 6.8   RBC 4.52 4.41 4.65   HGB 9.3* 9.1* 9.6*   HCT 32.8* 31.7* 33.7*   * 461* 547*     Basic Metabolic Panel    Recent Labs   Lab 07/29/22  0541 07/28/22  0408 07/28/22  0351 07/27/22  2349 07/27/22 2231   * 133  --   --  134   POTASSIUM 2.8* 3.4  --   --  3.6   CHLORIDE 98 102  --   --  101   CO2 25 24  --   --  26   BUN 11 13  --   --  14   CR 0.42* 0.44*  --   --  0.36*   * 95 86   < > 105*   ADY 8.4* 8.4*  --   --  8.3*    < > = values in this interval not displayed.     Liver Panel  Recent Labs   Lab 07/27/22  2231 07/27/22  0644 07/23/22  0734   PROTTOTAL 6.4* 6.5 6.0   ALBUMIN 2.6* 2.6* 2.3*   BILITOTAL 0.4 0.3 0.4   ALKPHOS 85 89 71   AST 11 12 30   ALT 16 18 34     INR    Recent Labs   Lab Test 07/27/22  2231 07/19/22  2255 02/07/22  0012   INR 1.13 1.36* 1.05      Lipid Profile    Recent Labs   Lab Test 05/19/22  1430 05/04/21  1205 05/10/18  1225   CHOL 247* 207* 221*   HDL 77 74 75   * 114 125   TRIG 105 93 104     A1C    Recent Labs   Lab Test 07/19/22  1901   A1C 5.6     Troponin I  No results for input(s): TROPONINIS, TROPONINI, GHTROP in the last 168 hours.    No charge

## 2022-07-29 NOTE — CONSULTS
DATE OF SERVICE : 7/29/2022    DATE OF ADMISSION: 7/27/2022    NEUROLOGICAL CONSULTATION    REQUESTED BY Samanta Hayes MD    SOURCE OF INFORMATION:Patient and  EHR    REASON FOR CONSULTATION:     Seizures   HISTORY OF PRESENT ILLNESS:     She is a 77 years old female she presented to [facility] emergency department on 7/19/2022 with acute onset of 5 days of diarrhea associated with fatigue lightheadedness with 1 episode of fall at home generalized myalgias and cough in the ED she was febrile initial WBC was elevated.  CT chest abdomen pelvis with bibasilar pneumonia she tested positive for COVID PCR.  She was initiated on remdesivir upon admission.  ID was consulted with superimposed bacterial pneumonia started on Vanco and meropenem, thereafter switched to cefepime Vanco and Flagyl 7/26 over the course of hospitalization she continues to have ongoing severe confusion neurology was consulted EEG was performed showed possible epileptic activity in the left frontal lobe head CT 7/25 showed left frontal lesion with some associated vasogenic edema MR brain with left frontal mass consistent with possible abscess versus neoplasm, purulent ventriculitis she was further transferred here for neurosurgical eval.  Currently followed by ID 7/28, off cefepime currently on Vanco ceftriaxone and Flagyl.       Past Medical History:   Diagnosis Date     Aspiration pneumonia (H) 02/2022     Depression      High cholesterol      Migraines      Pyloric ulcer, chronic      Sepsis (H) 08/10/2020     Trigeminal neuralgia          PSHx:   Past Surgical History:   Procedure Laterality Date     HYSTERECTOMY       PICC TRIPLE LUMEN PLACEMENT  7/22/2022          MO ESOPHAGOGASTRODUODENOSCOPY TRANSORAL DIAGNOSTIC N/A 8/13/2020    Procedure: ESOPHAGOGASTRODUODENOSCOPY (EGD);  Surgeon: Nathan Smalls MD;  Location: [facility] OR;  Service: Gastroenterology     MO ESOPHAGOGASTRODUODENOSCOPY TRANSORAL DIAGNOSTIC N/A 8/14/2020     Procedure: ESOPHAGOGASTRODUODENOSCOPY (EGD), PYLORIC DILATION;  Surgeon: Nathan Smalls MD;  Location: [facility];  Service: Gastroenterology     [facility] COLONOSCOPY W/WO BRUSH/WASH N/A 8/13/2020    Procedure: COLONOSCOPY WITH POLYPECTOMY;  Surgeon: Nathan Smalls MD;  Location: [facility];  Service: Gastroenterology     Medications Prior to Admission   Medication Sig Dispense Refill Last Dose     acetaminophen-codeine (TYLENOL #3) 300-30 MG tablet TAKE 1 TABLET BY MOUTH EVERY 6 HOURS AS NEEDED FOR PAIN 120 tablet 0 7/24/2022     ARTIFICIAL TEARS 1.4 % ophthalmic solution INSTILL 2 DROPS IN BOTH EYES AS NEEDED 15 mL 0 prn     cholecalciferol, vitamin D3, 50 mcg (2,000 unit) Tab [CHOLECALCIFEROL, VITAMIN D3, 50 MCG (2,000 UNIT) TAB] Take 1 tablet (2,000 Units total) by mouth daily. One tab daily 90 tablet 4      desonide (DESOWEN) 0.05 % cream [DESONIDE (DESOWEN) 0.05 % CREAM] Apply to affected area 2 times daily (Patient taking differently: Apply topically 2 times daily as needed) 60 g 1      ferrous sulfate 325 (65 FE) MG tablet [FERROUS SULFATE 325 (65 FE) MG TABLET] Take 1 tablet (325 mg total) by mouth daily with breakfast. 30 tablet 0      levETIRAcetam (KEPPRA) 500 MG tablet Take 1 tablet (500 mg) by mouth 2 times daily 180 tablet 0 7/27/2022 at 0900-1g     mirtazapine (REMERON) 15 MG tablet Take 1 tablet (15 mg) by mouth At Bedtime 30 tablet 1 Unknown at pt may have stopped PTA     multivitamin (DAILY-DAVID) per tablet [MULTIVITAMIN (DAILY-DAVID) PER TABLET] Take 1 tablet by mouth daily. 100 tablet 3      nortriptyline (PAMELOR) 25 MG capsule Take 50 mg by mouth 2 times daily   7/25/2022 at 0900     omeprazole (PRILOSEC) 40 MG DR capsule Take 1 capsule (40 mg) by mouth daily 90 capsule 3 7/27/2022 at 1600-Protonix at outside hospital     OXcarbazepine (TRILEPTAL) 150 MG tablet TAKE 3 TABLETS(450 MG) BY MOUTH AT BEDTIME 270 tablet 2 7/26/2022 at 2130     senna-docusate (SENOKOT-S/PERICOLACE)  8.6-50 MG tablet Take 1 tablet by mouth At Bedtime        topiramate (TOPAMAX) 50 MG tablet Take 1 tablet (50 mg) by mouth At Bedtime 90 tablet 4 7/26/2022 at 2130     nortriptyline (PAMELOR) 50 MG capsule Take 1 capsule (50 mg) by mouth 2 times daily (Patient not taking: No sig reported) 180 capsule 1      OXcarbazepine (TRILEPTAL) 150 MG tablet Take 3 tablets (450 mg) by mouth At Bedtime 60 tablet 0      polyvinyl alcohol (ARTIFICIAL TEARS) 1.4 % ophthalmic solution Place 1 drop into both eyes every hour as needed for dry eyes 60 mL 0      Current Facility-Administered Medications   Medication Dose Route Frequency     cefTRIAXone  2 g Intravenous Q12H     levETIRAcetam  1,000 mg Intravenous Q12H     metroNIDAZOLE  500 mg Intravenous Q8H     OXcarbazepine  450 mg Oral At Bedtime     pantoprazole (PROTONIX) IV  40 mg Intravenous Daily with breakfast     topiramate  50 mg Oral At Bedtime     vancomycin  1,000 mg Intravenous Q12H     Current Facility-Administered Medications   Medication Dose Route Frequency     acetaminophen  650 mg Oral Q4H PRN    Or     acetaminophen  650 mg Rectal Q4H PRN     glucose  15-30 g Oral Q15 Min PRN    Or     dextrose  25-50 mL Intravenous Q15 Min PRN    Or     glucagon  1 mg Subcutaneous Q15 Min PRN     guaiFENesin  10 mL Oral Q4H PRN     ipratropium - albuterol 0.5 mg/2.5 mg/3 mL  3 mL Nebulization Q4H PRN     - MEDICATION INSTRUCTIONS -   Does not apply Continuous PRN     ondansetron  4 mg Oral Q6H PRN    Or     ondansetron  4 mg Intravenous Q6H PRN     prochlorperazine  5 mg Intravenous Q6H PRN    Or     prochlorperazine  5 mg Oral Q6H PRN    Or     prochlorperazine  12.5 mg Rectal Q12H PRN          Allergies   Allergen Reactions     Contrast [Iohexol] Rash     Noticed during 08/2020 admission. Received IV contrast on 8/5     Chocolate Headache     Penicillins Rash         SocHx:  reports that she quit smoking about 7 years ago. She has a 50.00 pack-year smoking history. She has  never used smokeless tobacco. She reports that she does not drink alcohol and does not use drugs.      Family History   Problem Relation Age of Onset     Cancer Father      Testicular cancer Grandchild 17.00     Breast Cancer Mother      Breast Cancer Sister      Breast Cancer Maternal Aunt      Breast Cancer Sister        ROS: refer to H&P    PHYSICAL EXAM    Patient is in mild distress spontaneous eye opening does not follow commands incomprehensible noises  Pupils equal and round, no fixed gaze preference face symmetric  Spontaneous movement of bilateral lower extremities and left upper extremity noted  No rhythmic jerking or involuntary movements.  Reflexes 2 upper and lower extremities plantars are equivocal no clonus    /69 (BP Location: Right arm)   Pulse 99   Temp 98.5  F (36.9  C) (Oral)   Resp 16   Wt 49.5 kg (109 lb 2 oz)   SpO2 96%   BMI 18.16 kg/m          Lab and X-ray:   Recent Labs   Lab Test 07/29/22  1414 07/29/22  0541 07/19/22  1901 05/19/22  1430   WBC  --  8.3   < >  --    HGB  --  9.3*   < >  --    PLT  --  578*   < >  --    INR 1.34*  --    < >  --    POTASSIUM 3.5 2.8*   < > 4.1   LDL  --   --   --  149*    < > = values in this interval not displayed.     Recent Labs   Lab Test 07/29/22  1414 07/29/22  0541 07/28/22  0408   POTASSIUM 3.5 2.8* 3.4   CHLORIDE  --  98 102   BUN  --  11 13     Recent Labs   Lab Test 07/29/22  1414 07/29/22  0541 07/28/22  0408 07/27/22  2231   WBC  --  8.3 6.4 6.8   HGB  --  9.3* 9.1* 9.6*   MCV  --  73* 72* 73*   PLT  --  578* 461* 547*   INR 1.34*  --   --  1.13     Recent Labs   Lab Test 07/27/22  2231 07/27/22  0644   AST 11 12   ALT 16 18   ALKPHOS 85 89     Recent Labs   Lab Test 05/19/22  1430 05/04/21  1205   HDL 77 74   * 114     No lab results found.    Laboratory results were personally interpreted and reviewed in detail.  Imaging studies reviewed and interpreted in detail      Summary: List Problems:   Patient Active Problem List    Diagnosis     Osteopenia     Hyperlipidemia     Migraine headache     Trigeminal neuralgia     Counseling regarding advanced directives and goals of care     Controlled substance agreement signed     Hypercalcemia     Chronic pain syndrome     Iron deficiency anemia due to chronic blood loss     Abnormal chest CT     Mild major depression (H)     Pyloric ulcer, chronic     Sepsis (H)     Sepsis, due to unspecified organism, unspecified whether acute organ dysfunction present (H)     Infection due to 2019 novel coronavirus     ORTIZ (acute kidney injury) (H)     Encephalitis     Pyogenic brain abscess     Cerebral abscess       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    Dx -Meningitis vs malignancy -continues to be encephalopathic    EEG  7/26 left frontotemporal region slowing with intermittent sharp wave activity       PLAN    Repeat MR brain w/wo contrast  CSF analysis pending repeat MR brain  Keppra 1000 mg BID  Oncology eval  Neuro checks per protocol    Thank you for the opportunity to provide consultation on Julisa Chaney

## 2022-07-29 NOTE — PROGRESS NOTES
"[facility]  Infectious Disease Progress Note          Assessment and Plan:   Impression:  1. 77 y.o female transferred from OSH  2. Admitted first with on 7/ 19   3. Diagnosed with COVID( vaccinated)  though not much hypoxia, initial CXR clear   4. On treatment with remdesivir also on Vanco and meropenem which was switched to Vanco, cefepime and fagyl on 7/26  5. Hospitalization complicated with continued encephalopathy   6. MRI: .  Focal diffusion restriction centered at the left frontal horn with surrounding vasogenic edema. In the setting of sepsis, this raises concern for cerebral abscess. Alternatively, neoplastic process would be a consideration.   Focal areas of diffusion restriction within the occipital horns, concerning for pus/ventriculitis. Alternatively this could represent hemorrhage.  7. Transferred to [facility] for NS consultation.      Recommendations:  Needs tissue diagnosis LP vs abscess cultures discussed with NCC  Cell count with diff, glucose, protein, cultures, meningitis/ encephalitis panel.   Also consult with oncology for if CSF samplings needs to be sent for oncological analysis   Continue on vanco + ceftriaxone BID + flagyl, treating as brain abscess, easy to do and small enough that likely able to treat wo drainage , BUT I HIGHLY doubt that is the actual dx,looks like radiology interp influenced by \"sepsis\" but of course unrelated COVID likely is cause of that ? Biopsy, at least CSF        Interval History:   no new complaints looks chronically ill  Lethargic, T down no + micro except COVID              Medications:       cefTRIAXone  2 g Intravenous Q12H     levETIRAcetam  1,000 mg Intravenous Q12H     metroNIDAZOLE  500 mg Intravenous Q8H     OXcarbazepine  450 mg Oral At Bedtime     pantoprazole (PROTONIX) IV  40 mg Intravenous Daily with breakfast     potassium chloride  10 mEq Intravenous Q1H     topiramate  50 mg Oral At Bedtime     vancomycin  1,000 mg Intravenous " Q12H                  Physical Exam:   Blood pressure 118/69, pulse 99, temperature 98.5  F (36.9  C), temperature source Oral, resp. rate 16, weight 49.5 kg (109 lb 2 oz), SpO2 96 %, not currently breastfeeding.  Wt Readings from Last 2 Encounters:   07/28/22 49.5 kg (109 lb 2 oz)   07/26/22 52.3 kg (115 lb 3.2 oz)     Vital Signs with Ranges  Temp:  [98.5  F (36.9  C)-100  F (37.8  C)] 98.5  F (36.9  C)  Pulse:  [] 99  Resp:  [8-28] 16  BP: (111-155)/(54-89) 118/69  SpO2:  [92 %-97 %] 96 %    Constitutional: Awake, alert, cooperative, no apparent distress lethargic   Lungs: Clear to auscultation bilaterally, no crackles or wheezing   Cardiovascular: Regular rate and rhythm, normal S1 and S2, and no murmur noted   Abdomen: Normal bowel sounds, soft, non-distended, non-tender   Skin: No rashes, no cyanosis, no edema   Other: Chronic ill and cachexia appearance          Data:   All microbiology laboratory data reviewed.  Recent Labs   Lab Test 07/29/22  0541 07/28/22  0408 07/27/22  2231   WBC 8.3 6.4 6.8   HGB 9.3* 9.1* 9.6*   HCT 32.8* 31.7* 33.7*   MCV 73* 72* 73*   * 461* 547*     Recent Labs   Lab Test 07/29/22  0541 07/28/22  0408 07/27/22  2231   CR 0.42* 0.44* 0.36*     Recent Labs   Lab Test 08/08/20  0212   SED 13     No lab results found.    Invalid input(s): NAINA

## 2022-07-29 NOTE — PLAN OF CARE
/69 (BP Location: Right arm)   Pulse 99   Temp 98.5  F (36.9  C) (Oral)   Resp 16   Wt 49.5 kg (109 lb 2 oz)   SpO2 96%   BMI 18.16 kg/m      Patient is currently here with a suspected cerebral abscess. The patient's level of orientation as well as her neurological status are both extremely difficult to assess as the patient is primarily aphasic and noncompliant. VSS on RA. Telemetry readings reveal a normal sinus rhythm. Patient is currently NPO. Patient transfers and ambulates with an assist of two plus the utilization of a lift device. Patient is incontinent of both bowel and bladder, currently has an indwelling narayan catheter that is displaying adequate urinary output. Patient's skin is intact and in good condition aside from scattered bruising as well as a rash on the patient's sacrum/coccyx. Patient currently has Normal Saline infusing at a rate of 10 mL/hr infusing through the patient's peripherally inserted central catheter on her right upper arm. Denies pain. Patient is currently scoring green on the Aggression Stop Light Tool. Plan to continue to assess for any potential neurological changes. Discharge plans pending care coordination huddle.

## 2022-07-30 ENCOUNTER — APPOINTMENT (OUTPATIENT)
Dept: MRI IMAGING | Facility: CLINIC | Age: 77
DRG: 023 | End: 2022-07-30
Attending: NURSE PRACTITIONER
Payer: COMMERCIAL

## 2022-07-30 ENCOUNTER — APPOINTMENT (OUTPATIENT)
Dept: MRI IMAGING | Facility: CLINIC | Age: 77
DRG: 023 | End: 2022-07-30
Attending: PHYSICIAN ASSISTANT
Payer: COMMERCIAL

## 2022-07-30 ENCOUNTER — APPOINTMENT (OUTPATIENT)
Dept: GENERAL RADIOLOGY | Facility: CLINIC | Age: 77
DRG: 023 | End: 2022-07-30
Attending: RADIOLOGY
Payer: COMMERCIAL

## 2022-07-30 LAB
ANION GAP SERPL CALCULATED.3IONS-SCNC: 8 MMOL/L (ref 3–14)
BUN SERPL-MCNC: 6 MG/DL (ref 7–30)
CALCIUM SERPL-MCNC: 8.1 MG/DL (ref 8.5–10.1)
CHLORIDE BLD-SCNC: 102 MMOL/L (ref 94–109)
CO2 SERPL-SCNC: 23 MMOL/L (ref 20–32)
CREAT SERPL-MCNC: 0.37 MG/DL (ref 0.52–1.04)
CRP SERPL-MCNC: 43.7 MG/L (ref 0–8)
D DIMER PPP FEU-MCNC: 0.38 UG/ML FEU (ref 0–0.5)
ERYTHROCYTE [DISTWIDTH] IN BLOOD BY AUTOMATED COUNT: 18.6 % (ref 10–15)
FERRITIN SERPL-MCNC: 35 NG/ML (ref 8–252)
GFR SERPL CREATININE-BSD FRML MDRD: >90 ML/MIN/1.73M2
GLUCOSE BLD-MCNC: 78 MG/DL (ref 70–99)
GLUCOSE BLDC GLUCOMTR-MCNC: 136 MG/DL (ref 70–99)
GLUCOSE BLDC GLUCOMTR-MCNC: 99 MG/DL (ref 70–99)
HCT VFR BLD AUTO: 29.4 % (ref 35–47)
HGB BLD-MCNC: 8.5 G/DL (ref 11.7–15.7)
IRON SATN MFR SERPL: 6 % (ref 15–46)
IRON SERPL-MCNC: 14 UG/DL (ref 35–180)
MAGNESIUM SERPL-MCNC: 2.1 MG/DL (ref 1.6–2.3)
MCH RBC QN AUTO: 20.5 PG (ref 26.5–33)
MCHC RBC AUTO-ENTMCNC: 28.9 G/DL (ref 31.5–36.5)
MCV RBC AUTO: 71 FL (ref 78–100)
PHOSPHATE SERPL-MCNC: 1.9 MG/DL (ref 2.5–4.5)
PHOSPHATE SERPL-MCNC: 16 MG/DL (ref 2.5–4.5)
PLATELET # BLD AUTO: 602 10E3/UL (ref 150–450)
POTASSIUM BLD-SCNC: 2.9 MMOL/L (ref 3.4–5.3)
POTASSIUM BLD-SCNC: 3.2 MMOL/L (ref 3.4–5.3)
RBC # BLD AUTO: 4.15 10E6/UL (ref 3.8–5.2)
SODIUM SERPL-SCNC: 133 MMOL/L (ref 133–144)
TIBC SERPL-MCNC: 218 UG/DL (ref 240–430)
VIT B12 SERPL-MCNC: 2228 PG/ML (ref 232–1245)
WBC # BLD AUTO: 8.2 10E3/UL (ref 4–11)

## 2022-07-30 PROCEDURE — 85379 FIBRIN DEGRADATION QUANT: CPT | Performed by: INTERNAL MEDICINE

## 2022-07-30 PROCEDURE — A9585 GADOBUTROL INJECTION: HCPCS | Performed by: INTERNAL MEDICINE

## 2022-07-30 PROCEDURE — 250N000013 HC RX MED GY IP 250 OP 250 PS 637: Performed by: INTERNAL MEDICINE

## 2022-07-30 PROCEDURE — 70552 MRI BRAIN STEM W/DYE: CPT

## 2022-07-30 PROCEDURE — 82728 ASSAY OF FERRITIN: CPT | Performed by: INTERNAL MEDICINE

## 2022-07-30 PROCEDURE — 120N000001 HC R&B MED SURG/OB

## 2022-07-30 PROCEDURE — 255N000002 HC RX 255 OP 636: Performed by: INTERNAL MEDICINE

## 2022-07-30 PROCEDURE — 99232 SBSQ HOSP IP/OBS MODERATE 35: CPT | Performed by: INTERNAL MEDICINE

## 2022-07-30 PROCEDURE — 250N000011 HC RX IP 250 OP 636: Performed by: INTERNAL MEDICINE

## 2022-07-30 PROCEDURE — 83550 IRON BINDING TEST: CPT | Performed by: INTERNAL MEDICINE

## 2022-07-30 PROCEDURE — 86140 C-REACTIVE PROTEIN: CPT | Performed by: INTERNAL MEDICINE

## 2022-07-30 PROCEDURE — 83735 ASSAY OF MAGNESIUM: CPT | Performed by: INTERNAL MEDICINE

## 2022-07-30 PROCEDURE — 84132 ASSAY OF SERUM POTASSIUM: CPT | Performed by: INTERNAL MEDICINE

## 2022-07-30 PROCEDURE — C9113 INJ PANTOPRAZOLE SODIUM, VIA: HCPCS | Performed by: INTERNAL MEDICINE

## 2022-07-30 PROCEDURE — 82607 VITAMIN B-12: CPT | Performed by: INTERNAL MEDICINE

## 2022-07-30 PROCEDURE — 85027 COMPLETE CBC AUTOMATED: CPT | Performed by: INTERNAL MEDICINE

## 2022-07-30 PROCEDURE — 250N000009 HC RX 250: Performed by: INTERNAL MEDICINE

## 2022-07-30 PROCEDURE — 250N000009 HC RX 250: Performed by: RADIOLOGY

## 2022-07-30 PROCEDURE — 70553 MRI BRAIN STEM W/O & W/DYE: CPT

## 2022-07-30 PROCEDURE — 258N000003 HC RX IP 258 OP 636: Performed by: INTERNAL MEDICINE

## 2022-07-30 PROCEDURE — 82310 ASSAY OF CALCIUM: CPT | Performed by: INTERNAL MEDICINE

## 2022-07-30 PROCEDURE — 84100 ASSAY OF PHOSPHORUS: CPT | Performed by: INTERNAL MEDICINE

## 2022-07-30 PROCEDURE — 74340 X-RAY GUIDE FOR GI TUBE: CPT

## 2022-07-30 PROCEDURE — 999N000190 HC STATISTIC VAT ROUNDS

## 2022-07-30 RX ORDER — LORAZEPAM 2 MG/ML
2 INJECTION INTRAMUSCULAR
Status: ACTIVE | OUTPATIENT
Start: 2022-07-30 | End: 2022-07-30

## 2022-07-30 RX ORDER — GADOBUTROL 604.72 MG/ML
15 INJECTION INTRAVENOUS ONCE
Status: COMPLETED | OUTPATIENT
Start: 2022-07-30 | End: 2022-07-30

## 2022-07-30 RX ORDER — POTASSIUM CHLORIDE 20MEQ/15ML
20 LIQUID (ML) ORAL ONCE
Status: COMPLETED | OUTPATIENT
Start: 2022-07-30 | End: 2022-07-30

## 2022-07-30 RX ORDER — TOPIRAMATE 50 MG/1
50 TABLET, FILM COATED ORAL AT BEDTIME
Status: DISCONTINUED | OUTPATIENT
Start: 2022-07-30 | End: 2022-10-04 | Stop reason: HOSPADM

## 2022-07-30 RX ORDER — GUAIFENESIN 600 MG/1
15 TABLET, EXTENDED RELEASE ORAL DAILY
Status: DISCONTINUED | OUTPATIENT
Start: 2022-07-30 | End: 2022-10-04 | Stop reason: HOSPADM

## 2022-07-30 RX ORDER — POTASSIUM CHLORIDE 20MEQ/15ML
40 LIQUID (ML) ORAL ONCE
Status: COMPLETED | OUTPATIENT
Start: 2022-07-30 | End: 2022-07-30

## 2022-07-30 RX ORDER — ACETAMINOPHEN 650 MG/1
650 SUPPOSITORY RECTAL EVERY 4 HOURS PRN
Status: DISCONTINUED | OUTPATIENT
Start: 2022-07-30 | End: 2022-10-04 | Stop reason: HOSPADM

## 2022-07-30 RX ORDER — LIDOCAINE HYDROCHLORIDE 20 MG/ML
10 JELLY TOPICAL ONCE
Status: COMPLETED | OUTPATIENT
Start: 2022-07-30 | End: 2022-07-30

## 2022-07-30 RX ORDER — ACETAMINOPHEN 325 MG/10.15ML
650 LIQUID ORAL EVERY 4 HOURS PRN
Status: DISCONTINUED | OUTPATIENT
Start: 2022-07-30 | End: 2022-09-18 | Stop reason: ALTCHOICE

## 2022-07-30 RX ORDER — POTASSIUM CHLORIDE 29.8 MG/ML
20 INJECTION INTRAVENOUS
Status: COMPLETED | OUTPATIENT
Start: 2022-07-30 | End: 2022-07-30

## 2022-07-30 RX ADMIN — LEVETIRACETAM 1000 MG: 10 INJECTION INTRAVENOUS at 10:48

## 2022-07-30 RX ADMIN — POTASSIUM CHLORIDE 20 MEQ: 20 SOLUTION ORAL at 20:25

## 2022-07-30 RX ADMIN — METRONIDAZOLE 500 MG: 500 INJECTION, SOLUTION INTRAVENOUS at 09:11

## 2022-07-30 RX ADMIN — LEVETIRACETAM 1000 MG: 10 INJECTION INTRAVENOUS at 22:08

## 2022-07-30 RX ADMIN — VANCOMYCIN HYDROCHLORIDE 1000 MG: 1 INJECTION, SOLUTION INTRAVENOUS at 17:26

## 2022-07-30 RX ADMIN — GADOBUTROL 15 ML: 604.72 INJECTION INTRAVENOUS at 21:46

## 2022-07-30 RX ADMIN — VANCOMYCIN HYDROCHLORIDE 1000 MG: 1 INJECTION, SOLUTION INTRAVENOUS at 08:47

## 2022-07-30 RX ADMIN — METRONIDAZOLE 500 MG: 500 INJECTION, SOLUTION INTRAVENOUS at 01:43

## 2022-07-30 RX ADMIN — POTASSIUM CHLORIDE 40 MEQ: 20 SOLUTION ORAL at 18:54

## 2022-07-30 RX ADMIN — CEFTRIAXONE SODIUM 2 G: 2 INJECTION, POWDER, FOR SOLUTION INTRAMUSCULAR; INTRAVENOUS at 05:46

## 2022-07-30 RX ADMIN — METRONIDAZOLE 500 MG: 500 INJECTION, SOLUTION INTRAVENOUS at 17:25

## 2022-07-30 RX ADMIN — POTASSIUM CHLORIDE AND SODIUM CHLORIDE: 900; 150 INJECTION, SOLUTION INTRAVENOUS at 08:39

## 2022-07-30 RX ADMIN — Medication 15 ML: at 17:21

## 2022-07-30 RX ADMIN — OXCARBAZEPINE 450 MG: 300 TABLET, FILM COATED ORAL at 22:08

## 2022-07-30 RX ADMIN — POTASSIUM CHLORIDE 20 MEQ: 29.8 INJECTION, SOLUTION INTRAVENOUS at 10:53

## 2022-07-30 RX ADMIN — LIDOCAINE HYDROCHLORIDE 6 ML: 20 JELLY TOPICAL at 14:56

## 2022-07-30 RX ADMIN — POTASSIUM CHLORIDE 20 MEQ: 29.8 INJECTION, SOLUTION INTRAVENOUS at 09:16

## 2022-07-30 RX ADMIN — SODIUM PHOSPHATE, MONOBASIC, MONOHYDRATE 15 MMOL: 276; 142 INJECTION, SOLUTION INTRAVENOUS at 13:54

## 2022-07-30 RX ADMIN — TOPIRAMATE 50 MG: 50 TABLET ORAL at 22:08

## 2022-07-30 RX ADMIN — PANTOPRAZOLE SODIUM 40 MG: 40 INJECTION, POWDER, FOR SOLUTION INTRAVENOUS at 08:31

## 2022-07-30 RX ADMIN — CEFTRIAXONE SODIUM 2 G: 2 INJECTION, POWDER, FOR SOLUTION INTRAMUSCULAR; INTRAVENOUS at 16:52

## 2022-07-30 ASSESSMENT — ACTIVITIES OF DAILY LIVING (ADL)
ADLS_ACUITY_SCORE: 39
ADLS_ACUITY_SCORE: 41
ADLS_ACUITY_SCORE: 39
ADLS_ACUITY_SCORE: 41
ADLS_ACUITY_SCORE: 43
ADLS_ACUITY_SCORE: 39
ADLS_ACUITY_SCORE: 41
ADLS_ACUITY_SCORE: 39

## 2022-07-30 ASSESSMENT — VISUAL ACUITY
OD: NOT TESTED
OD: NOT TESTED
OS: NOT TESTED
OS: NOT TESTED

## 2022-07-30 NOTE — CONSULTS
Consult Date: 07/30/2022    HEMATOLOGY/ONCOLOGY CONSULTATION    This consult has been requested by Dr. Rhodes for brain lesion.    Ms. Chaney is a 77-year-old female who was recently admitted to [facility].  The patient was found to be COVID positive on 07/19/2022.  The patient had CT head done on 07/25/2022.  There was new vasogenic edema of the left frontal lobe with possible small mass.  Brain MRI done on 07/27/2022 reveals a focal diffusion restriction centered at the left frontal horn with surrounding vasogenic edema.  This could be abscess.  Alternatively, it could be a neoplastic process.  There is also concern for pus/ventriculitis within the occipital horn.    The patient was transferred to [facility] for neurosurgical care.    The patient is being followed by multiple different consultants including Neurology, Neurosurgery and Infectious Disease.  The patient is on IV antibiotics.  The patient will be having a lumbar puncture done at some point.    I reviewed her chart. There is no known history of any malignancy.  Recent CT abdomen and pelvis on 07/19/2022 did not reveal any malignancy.  There is patchy consolidation and infiltrate in both the lung bases from recent COVID-19 pneumonia.  Chest x-ray on 07/19/2022 did not reveal any mass.    The patient is in isolation because of COVID-19.  I saw her. Patient is weak.  She has NG tube.  She is not in pain.  She has limited verbal communication.  I spoke to the nurse.  As per her, the patient's overall condition is stable.  She does not seem to be in pain.  No bleeding.  No fever.    ALLERGIES:  REVIEWED.    MEDICATIONS:  Reviewed.    PAST MEDICAL HISTORY:     1.  Depression.  2.  Hypercholesterolemia.  3.  Migraine.  4.  Sepsis.  5.  Trigeminal neuralgia.  6.  Aspiration pneumonia.  7.  Hysterectomy.    SOCIAL HISTORY:    -She is a former smoker.  -No alcohol use.    PHYSICAL EXAMINATION:    GENERAL:  The patient looked very weak.   Not in any respiratory distress. Not in pain. She is emaciated.  VITALS:  Reviewed.   Rest of the systems not examined.    LABORATORY DATA:  Reviewed.    IMPRESSION:     1.  A 77-year-old female with abnormal brain MRI suspicious for either abscess or neoplastic process in the left frontal horn.  2.  Microcytic anemia.  3.  Thrombocytosis, reactive.  4.  Recent COVID-19 infection.    RECOMMENDATIONS:     1.  Brain MRI is abnormal.  There is likely abscess/infection.  It could also be malignancy.    The patient will be undergoing a lumbar puncture.  Samples will be sent for flow cytometry.    Neurosurgery is following.  They will decide regarding drainage/biopsy.    2.  Patient recently had a CT abdomen and pelvis. No evidence of malignancy.  No CT chest was done recently. Chest x-ray does not reveal a mass. No other imaging needed from oncology at this time. If malignancy diagnosed, will get CT chest later.    3.  The patient has microcytic anemia.  We will check some labs including iron studies.    4.  Oncology will continue to follow.    Thanks for the consult.    TOTAL TIME SPENT:  60 minutes.  Time spent in today's visit, review of chart/investigations today and documentation today.    Lexi Fuller MD        D: 2022   T: 2022   MT: HILLARY    Name:     CHELITA SAWANTLucy  MRN:      [id number]        Account:      [id number]   :      1945           Consult Date: 2022     Document: [id number]

## 2022-07-30 NOTE — PLAN OF CARE
Goal Outcome Evaluation:    Pt here with sepsis and metabolic encephalopathy r/t to Covid + / Pneumonia Bacteria. Alert, non verbal. Neuros difficult to assess due to pt not following commands. Moving all extremities, withdrawing to stimulus, opens eyes spontaneously or to voice. Abrams in place for retention, with adequate ouput. Tele sinus tach. NPO.  Up with A2 lift. Pt scoring green on the Aggression Stop Light Tool. Plan MRI with medication to maintain stillness during MRI, and NJ tube placement d/t poor appetite. Discharge plan pending.

## 2022-07-30 NOTE — PROGRESS NOTES
Neurosurgery following for probable brain abscess.  Awaiting brain MRI with and without contrast and stealth protocol MRI.    Discussed with nursing to please obtain MRI's today.  Per nursing stable on exam.    Shzaia Caban, MPAS  [facility] Neurosurgery  [address] [address]    Tel [phone]  Pager [phone]

## 2022-07-30 NOTE — PROVIDER NOTIFICATION
MD Notification    Notified Person: MD    Notified Person Name: Dr. ENZO Rhodes    Notification Date/Time:  Today; 0758    Notification Interaction: Text page    Purpose of Notification:    Pt needs an NJ placed. No trained house Resource RN today.  Will need this line placed in radiology so please order as required.  Thank you.     Orders Received:    Comments:

## 2022-07-30 NOTE — PLAN OF CARE
"Goal Outcome Evaluation:    Plan of Care Reviewed With: patient and daughter via phone    Overall Patient Progress: declining    Pt here with sepsis and metabolic encephalopathy. Alert, non verbal. Neuros difficult to assess due to pt not following commands. Moving all extremities, withdrawing to stimulus, opens eyes spontaneously or to voice. Abrams in place for retention. Tele sinus tach. NPO.  Up with A2 lift. Pt scoring green on the Aggression Stop Light Tool. Plan MRI, LP, feeding tube placement. Discharge plan pending.    MD placed orders for feeding tube. RN called float resource RN who was not trained for feeding tube placement. CN recommended feeding tube could be placed tomorrow or in radiology if MD ordered. Checked with MD who said ok to wait until tomorrow.    Patient showing signs of pain- moaning and intermittently saying \"ow\" at rest, grimacing with repositioning. MD updated who added PRNs. Patient repositioned, oral cares provided, heat pack provided. No change. PRN tylenol given. No change. PRN morphine given.   "

## 2022-07-30 NOTE — PROGRESS NOTES
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    Dx -Meningitis vs malignancy     EEG  7/26 left frontotemporal region slowing with intermittent sharp wave activity     48 hrs EEG recording -severe diffuse nonspecific encephalopathy with irritability over the posterior head regions     No definite electrographic/clinical seizures    PLAN    MR brain w/wo contrast- pending   CSF analysis pending repeat MR brain  Keppra 1000 mg BID  Neuro checks per protocol    Thank you for the opportunity to provide consultation on Julisa Chaney    HPI    She is a 77 years old female she presented to [facility] emergency department on 7/19/2022 with acute onset of 5 days of diarrhea associated with fatigue lightheadedness with 1 episode of fall at home generalized myalgias and cough in the ED she was febrile initial WBC was elevated.  CT chest abdomen pelvis with bibasilar pneumonia she tested positive for COVID PCR.  She was initiated on remdesivir upon admission.  ID was consulted with superimposed bacterial pneumonia started on Vanco and meropenem, thereafter switched to cefepime Vanco and Flagyl 7/26 over the course of hospitalization she continues to have ongoing severe confusion neurology was consulted EEG was performed showed possible epileptic activity in the left frontal lobe head CT 7/25 showed left frontal lesion with some associated vasogenic edema MR brain with left frontal mass consistent with possible abscess versus neoplasm, purulent ventriculitis she was further transferred here for  neurosurgical eval.  Currently followed by ID 7/28, off cefepime currently on Vanco ceftriaxone and Flagyl.     Clinical events -   No spells of seizure like activity. EEG - no electrographic seizures    EXAMINATION:   Past medical history, family history, social history and review of systems are unchanged except as noted below.    VITAL SIGNS:   BP (!) 147/87 (BP Location: Left arm)   Pulse 105   Temp 97.7  F (36.5  C) (Axillary)   Resp 20   Wt 53.2 kg (117 lb 4.6 oz)   SpO2 95%   BMI 19.52 kg/m        PHYSICAL EXAM  Pt is resting , no acute distress   Able to open eyes and do head nod to verbal commands   Pupils equal and round, no fixed gaze preference face symmetric  Able to squeeze right hand to commands , withdrawal response in both lower limbs   No rhythmic jerking or involuntary movements.  Reflexes 2 upper and lower extremities plantars are equivocal no clonus    PERTINENT DATA:  Lab and X-ray: Recent Labs   Lab Test 07/30/22  0559 07/29/22  1414 07/19/22  1901 05/19/22  1430   WBC 8.2  --    < >  --    HGB 8.5*  --    < >  --    *  --    < >  --    INR  --  1.34*   < >  --    POTASSIUM 3.2* 3.5   < > 4.1   LDL  --   --   --  149*    < > = values in this interval not displayed.     [unfilled]  Recent Labs   Lab Test 07/30/22  0559 07/29/22  1414 07/29/22  0541   POTASSIUM 3.2* 3.5 2.8*   CHLORIDE 102  --  98   BUN 6*  --  11     Recent Labs   Lab Test 07/30/22  0559 07/29/22  1414 07/29/22  0541 07/28/22  0408 07/27/22 2231   WBC 8.2  --  8.3   < > 6.8   HGB 8.5*  --  9.3*   < > 9.6*   MCV 71*  --  73*   < > 73*   *  --  578*   < > 547*   INR  --  1.34*  --   --  1.13    < > = values in this interval not displayed.     Recent Labs   Lab Test 07/27/22 2231 07/27/22  0644   AST 11 12   ALT 16 18   ALKPHOS 85 89     Recent Labs   Lab Test 05/19/22  1430 05/04/21  1205   HDL 77 74   * 114     No lab results found.    Laboratory results were personally interpreted and reviewed  in detail.    Imaging studies reviewed and interpreted in detail    Summary: List Problems:   Patient Active Problem List   Diagnosis     Osteopenia     Hyperlipidemia     Migraine headache     Trigeminal neuralgia     Counseling regarding advanced directives and goals of care     Controlled substance agreement signed     Hypercalcemia     Chronic pain syndrome     Iron deficiency anemia due to chronic blood loss     Abnormal chest CT     Mild major depression (H)     Pyloric ulcer, chronic     Sepsis (H)     Sepsis, due to unspecified organism, unspecified whether acute organ dysfunction present (H)     Infection due to 2019 novel coronavirus     ORTIZ (acute kidney injury) (H)     Encephalitis     Pyogenic brain abscess     Cerebral abscess

## 2022-07-30 NOTE — CONSULTS
BRIEF NUTRITION NOTE:    Planning FT placement in IR today.  A full Nutrition Assessment was completed 7/28. See note for details.    NEW FINDINGS:  Today is potentially 9-12 days without adequate nutrition and patient is at risk for refeeding syndrome (recent weight loss and low BMI).  Patient had BM x1 today.  Na 133 (NL) - previous concern for SIADH noted.  K 3.2 (L) --> IV replacement underway  Mg 2.1 (NL)  Phos 1.9 (L) - No replacement yet  BUN 6 (L) - indicates poor protein intake.  CRP 43.7 (H).  IVF NaCl + 20 KCl at 75 mL/hr.    INTERVENTIONS:  Enteral Nutrition (May begin and advance TF rate if K+ >3, Mg >1.5, and Phos >1.9):  Step 1 - Initiate TF Osmolite 1.5 at 10 mL/hr; after 12 hrs increase to 20 mL/hr = 720 kcals (15 kcal/kg), 30 gm pro (0.6 gm/kg), 366 mL H20, 98 gm CHO, no fiber  Free H20 30 mL every 4 hrs (180 mL) - for FT patency.    Step 2:  After 24 hrs, if labs acceptable, increase TF Osmolite 1.5 to 35 mL/hr = 1260 kcals (26 kcal/kg), 53 gm pro (1.1 gm/kg), 640 mL H20, 171 gm CHO, no fiber.    Step 3:  After 24 hrs, if labs acceptable, increase TF Osmolite 1.5 to goal 50 mL/hr = 1800 kcals (37 kcal/kg), 76 gm pro (1.6 gm/kg), 914 mL H20, 245 gm CHO, no fiber.    RECOMMENDATIONS:  Recommend provider address modification of free H20 flushes when IVF is discontinued (pending fluid status, Na, etc).    Lakeshia Osman, RD, LD, CNSC

## 2022-07-30 NOTE — PROGRESS NOTES
[facility]    Medicine Progress Note - Hospitalist Service    Date of Admission:  7/27/2022    Assessment & Plan          Julisa Chaney is a pleasant 77-year-old female with trigeminal neuralgia who presented to [facility] on 7/19 due to severe sepsis (leukocytosis, lactic acidosis, elevated procalcitonin).  She was found to have COVID-19 with superimposed bacterial pneumonia.  She was treated with IV vancomycin and IV meropenem.  Her hospitalization was complicated by severe metabolic encephalopathy.  Head CT on 7/25 showed possible left frontal mass causing vasogenic edema.  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  She was switched to IV vancomycin, cefepime and metronidazole and transferred to [facility] for neurosurgery assessment.  Her surveillance MRSA nasal swabs were positive.  She was assessed by neurosurgery on admission, no surgical intervention was advised.    #.  Left frontal lobe mass with vasogenic edema-due to abscess versus neoplasm  #.  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  Awaiting neuro critical care/neurology's recommendations about proceeding with LP or to see if this is contraindicated.  - Continue antibiotics-IV vancomycin + ceftriaxone BID + IV metronidazole  - Every 4 hours neurochecks  - Neurosurgery consulted.  No surgical interventions advised at this time  - Neurology consulted.  MRI with and without contrast, video EEG  - decision to do LP will be make after MRI results   - consult oncology     #.  Acute metabolic encephalopathy  EEG 7/28 showed findings consistent with severe diffuse nonspecific encephalopathy with additional cortical irritability over posterior head region.  No seizures.  - Continue supportive cares.  Patient remains obtunded  - NG placed,  start tube feeds     #.  Severe sepsis - due to acute infection - intracerebral abscess with purulent ventriculitis and bacterial pneumonia  - Had presented with lactate of 4, procalcitonin of 2.11, CRP 26, leukocytosis 26.2  - Chest x-ray was clear but CT abdomen/pelvis showed patchy infiltrate in bibasilar area.  - Treated with IV vancomycin and IV meropenem at [facility]  - Subsequently had CT head and MRI brain that showed probable brain abscess with purulent ventriculitis  -Currently on -IV vancomycin + ceftriaxone BID + IV metronidazole  -Leukocytosis has resolved.  Elevated CRP of 49    #.  Acute COVID-19 illness  -Tested positive on 7/19.  Chest x-ray clear  -Is vaccinated  -Treated with 5 days of remdesivir  -Did not receive dexamethasone as she was not hypoxic  -CRP was 28 on 7/21 and subsequently trended down to 5.7  -D-dimer peaked at 1.91 on 7/21 and then trended down to 0.82 on 7/26  -No respiratory symptoms  -Continue COVID-19 precautions  -Was on Lovenox for DVT prophylaxis at [facility].  This is currently held as patient may need LP or biopsy for brain abscess/mass    #.  Probable seizures  -On Keppra PTA-seems this was prescribed for ataxia and tremors in 2/2022 when she was hospitalized for aspiration pneumonia.  -EEG obtained at [facility] indicated possible seizures and left frontal lobe  -Now on Keppra 1000 mg IV twice daily (Keppra level on 7/26 was 6 so her home dose was doubled)  -Video EEG 7/28 showed findings consistent with severe diffuse nonspecific encephalopathy with additional cortical irritability over posterior head region.  No seizures.  -neurology following      #.  Hyponatremia, sodium 129  -Nephrology was consulted at [facility].  SIADH was suspected and she was placed on fluid restrictions.  Sodium improved to 132    #.  Chronic iron deficiency anemia  -Hemoglobin stable and at baseline, 9.5  -Monitor    #.  Trigeminal neuralgia  PTA oxcarbazepine, Topamax, resume      #.  Peptic ulcer disease  EGD 2020 showed gastroduodenitis with duodenal stricture  -IV PPI    #.  Hypoalbuminemia, albumin 2.6    #.  Hypokalemia, potassium 2.8  -On replacement    #. FEN -   -Obtunded and unable to take p.o.  -Receiving IV fluids, NS with 20 mEq KCl at 50 mL/h  - start tube feeds     # Severe Malnutrition: based on nutrition assessment  - nutrition consulted   - start tube feeds         Diet: Adult Formula Drip Feeding: Continuous Osmolite 1.5; Other - Specify in Comment; Goal Rate: 50; mL/hr; Medication - Feeding Tube Flush Frequency: At least 15-30 mL water before and after medication administration and with tube clogging; Amount to ...    DVT Prophylaxis: Pneumatic Compression Devices  Abrams Catheter: PRESENT, indication: Retention  Central Lines: PRESENT  PICC Triple Lumen 07/22/22 Right Basilic Vascular access-Site Assessment: WDL  Cardiac Monitoring: ACTIVE order. Indication: ICU  Code Status: Full Code      Disposition Plan      Expected Discharge Date: 08/02/2022        Discharge Comments: Needs NG feeding tube placed      The patient's care was discussed with the Bedside Nurse.    Samanta Rhodes MD  Hospitalist Service  [facility]  Securely message with the Vocera Web Console (learn more here)  Text page via Snap[employer]ense Paging/Directory         Clinically Significant Risk Factors Present on Admission                      ______________________________________________________________________    Interval History   Patient remains unresponsive.    Afebrile, vital signs stable   No improvement in mental status   NG placed. Discussed with daughter on 7/29  MRI today       Data reviewed today: I reviewed all medications, new labs and imaging results over the last 24 hours. I personally reviewed no images or EKG's today.    Physical Exam   Vital Signs: Temp: 97.7  F (36.5  C) Temp src: Axillary BP: (!) 147/87 Pulse: 105   Resp: 20 SpO2: 95 % O2 Device: None (Room air)     Weight: 117 lbs 4.56 oz    Constitutional-obtunded, does not respond, appears comfortable   Cardiovascular-regular rate and rhythm, no edema  Pulmonary-lungs are clear to auscultation bilaterally, no wheezing or rhonchi  GI-abdomen is soft, nontender, nondistended, no hepatosplenomegaly or masses  Integumentary-skin is warm and dry, no rashes or ulcers  Neurological-not awake or alert       Data   Recent Labs   Lab 07/30/22  1212 07/30/22  0559 07/29/22  1414 07/29/22  0541 07/28/22  0408 07/27/22  2349 07/27/22  2231 07/27/22  2103 07/27/22  0644   WBC  --  8.2  --  8.3 6.4  --  6.8  --  9.9   HGB  --  8.5*  --  9.3* 9.1*  --  9.6*  --  9.8*   MCV  --  71*  --  73* 72*  --  73*  --  72*   PLT  --  602*  --  578* 461*  --  547*  --  549*   INR  --   --  1.34*  --   --   --  1.13  --   --    NA  --  133  --  132* 133  --  134  --  131*   POTASSIUM  --  3.2* 3.5 2.8* 3.4  --  3.6  --  3.5   CHLORIDE  --  102  --  98 102  --  101  --  99   CO2  --  23  --  25 24  --  26  --  23   BUN  --  6*  --  11 13  --  14  --  11   CR  --  0.37*  --  0.42* 0.44*  --  0.36*  --  0.54*   ANIONGAP  --  8  --  9 7  --  7  --  9   ADY  --  8.1*  --  8.4* 8.4*  --  8.3*  --  8.3*   GLC 99 78  --  100* 95   < > 105*   < > 110   ALBUMIN  --   --   --   --   --   --  2.6*  --  2.6*   PROTTOTAL  --   --   --   --   --   --  6.4*  --  6.5   BILITOTAL  --   --   --   --   --   --  0.4  --  0.3   ALKPHOS  --   --   --   --   --   --  85  --  89   ALT  --   --   --   --   --   --  16  --  18   AST  --   --   --   --   --   --  11  --  12    < > = values in this interval not displayed.     Recent Results (from the past 24 hour(s))   XR Feeding Tube Placement    Narrative    [facility]  NASOJEJUNAL TUBE PLACEMENT WITH FLUOROSCOPIC GUIDANCE  7/30/2022 3:00 PM    INDICATION: Enteral nutrition.  FLUOROSCOPIC TIME: 5.2 minutes.  CONTRAST: 5  mL.    PROCEDURE: Lidocaine jelly was introduced through the right naris.  Using fluoroscopic  guidance, a Danish feeding tube was introduced  through the naris and advanced into the stomach. Attempts were made at  manipulating the feeding tube through the gastric antrum without  success.    Patient tolerated the procedure well without immediate complications.       CONCLUSION:  1.  Fluoroscopic placement of nasogastric feeding tube. Unsuccessful  in advancing of the catheter beyond the pylorus into the duodenum,  despite lengthy attempts.    RADHA VILLA MD         SYSTEM ID:  [id number]     Medications     0.9% sodium chloride + KCl 20 mEq/L 75 mL/hr at 07/30/22 0839     - MEDICATION INSTRUCTIONS -         cefTRIAXone  2 g Intravenous Q12H     levETIRAcetam  1,000 mg Intravenous Q12H     metroNIDAZOLE  500 mg Intravenous Q8H     multivitamins w/minerals  15 mL Per Feeding Tube Daily     OXcarbazepine  450 mg Oral or Feeding Tube At Bedtime     pantoprazole (PROTONIX) IV  40 mg Intravenous Daily with breakfast     sodium phosphate  15 mmol Intravenous Once     topiramate  50 mg Oral or Feeding Tube At Bedtime     vancomycin  1,000 mg Intravenous Q12H

## 2022-07-31 ENCOUNTER — APPOINTMENT (OUTPATIENT)
Dept: CT IMAGING | Facility: CLINIC | Age: 77
DRG: 023 | End: 2022-07-31
Attending: PHYSICIAN ASSISTANT
Payer: COMMERCIAL

## 2022-07-31 ENCOUNTER — ANESTHESIA (OUTPATIENT)
Dept: SURGERY | Facility: CLINIC | Age: 77
DRG: 023 | End: 2022-07-31
Payer: COMMERCIAL

## 2022-07-31 ENCOUNTER — ANESTHESIA EVENT (OUTPATIENT)
Dept: SURGERY | Facility: CLINIC | Age: 77
DRG: 023 | End: 2022-07-31
Payer: COMMERCIAL

## 2022-07-31 LAB
ANION GAP SERPL CALCULATED.3IONS-SCNC: 4 MMOL/L (ref 3–14)
BUN SERPL-MCNC: 4 MG/DL (ref 7–30)
CALCIUM SERPL-MCNC: 8.8 MG/DL (ref 8.5–10.1)
CHLORIDE BLD-SCNC: 100 MMOL/L (ref 94–109)
CO2 SERPL-SCNC: 27 MMOL/L (ref 20–32)
CREAT SERPL-MCNC: 0.37 MG/DL (ref 0.52–1.04)
CRP SERPL-MCNC: 44.4 MG/L (ref 0–8)
D DIMER PPP FEU-MCNC: 0.42 UG/ML FEU (ref 0–0.5)
ERYTHROCYTE [DISTWIDTH] IN BLOOD BY AUTOMATED COUNT: 18.8 % (ref 10–15)
FOLATE SERPL-MCNC: 10.1 NG/ML (ref 4.6–34.8)
GFR SERPL CREATININE-BSD FRML MDRD: >90 ML/MIN/1.73M2
GLUCOSE BLD-MCNC: 119 MG/DL (ref 70–99)
GLUCOSE BLDC GLUCOMTR-MCNC: 132 MG/DL (ref 70–99)
HCT VFR BLD AUTO: 25.7 % (ref 35–47)
HGB BLD-MCNC: 7.4 G/DL (ref 11.7–15.7)
MAGNESIUM SERPL-MCNC: 2.1 MG/DL (ref 1.6–2.3)
MCH RBC QN AUTO: 20.7 PG (ref 26.5–33)
MCHC RBC AUTO-ENTMCNC: 28.8 G/DL (ref 31.5–36.5)
MCV RBC AUTO: 72 FL (ref 78–100)
PHOSPHATE SERPL-MCNC: 1.9 MG/DL (ref 2.5–4.5)
PHOSPHATE SERPL-MCNC: 2.5 MG/DL (ref 2.5–4.5)
PLATELET # BLD AUTO: 523 10E3/UL (ref 150–450)
POTASSIUM BLD-SCNC: 4.2 MMOL/L (ref 3.4–5.3)
POTASSIUM BLD-SCNC: 4.2 MMOL/L (ref 3.4–5.3)
RBC # BLD AUTO: 3.57 10E6/UL (ref 3.8–5.2)
RETICS # AUTO: 0.07 10E6/UL (ref 0.03–0.1)
RETICS/RBC NFR AUTO: 1.8 % (ref 0.5–2)
SODIUM SERPL-SCNC: 131 MMOL/L (ref 133–144)
VANCOMYCIN SERPL-MCNC: 18.7 MG/L
WBC # BLD AUTO: 8.2 10E3/UL (ref 4–11)

## 2022-07-31 PROCEDURE — 710N000009 HC RECOVERY PHASE 1, LEVEL 1, PER MIN: Performed by: STUDENT IN AN ORGANIZED HEALTH CARE EDUCATION/TRAINING PROGRAM

## 2022-07-31 PROCEDURE — 85027 COMPLETE CBC AUTOMATED: CPT | Performed by: INTERNAL MEDICINE

## 2022-07-31 PROCEDURE — C9113 INJ PANTOPRAZOLE SODIUM, VIA: HCPCS | Performed by: INTERNAL MEDICINE

## 2022-07-31 PROCEDURE — 250N000025 HC SEVOFLURANE, PER MIN: Performed by: STUDENT IN AN ORGANIZED HEALTH CARE EDUCATION/TRAINING PROGRAM

## 2022-07-31 PROCEDURE — 250N000011 HC RX IP 250 OP 636: Performed by: INTERNAL MEDICINE

## 2022-07-31 PROCEDURE — 250N000009 HC RX 250: Performed by: INTERNAL MEDICINE

## 2022-07-31 PROCEDURE — 61210 BURR HOLE IMPLT VENTR CATH: CPT | Performed by: STUDENT IN AN ORGANIZED HEALTH CARE EDUCATION/TRAINING PROGRAM

## 2022-07-31 PROCEDURE — 84132 ASSAY OF SERUM POTASSIUM: CPT | Performed by: INTERNAL MEDICINE

## 2022-07-31 PROCEDURE — 258N000003 HC RX IP 258 OP 636: Performed by: NURSE ANESTHETIST, CERTIFIED REGISTERED

## 2022-07-31 PROCEDURE — P9045 ALBUMIN (HUMAN), 5%, 250 ML: HCPCS | Performed by: NURSE ANESTHETIST, CERTIFIED REGISTERED

## 2022-07-31 PROCEDURE — 272N000002 HC OR SUPPLY OTHER OPNP: Performed by: STUDENT IN AN ORGANIZED HEALTH CARE EDUCATION/TRAINING PROGRAM

## 2022-07-31 PROCEDURE — 999N000141 HC STATISTIC PRE-PROCEDURE NURSING ASSESSMENT: Performed by: STUDENT IN AN ORGANIZED HEALTH CARE EDUCATION/TRAINING PROGRAM

## 2022-07-31 PROCEDURE — 360N000078 HC SURGERY LEVEL 5, PER MIN: Performed by: STUDENT IN AN ORGANIZED HEALTH CARE EDUCATION/TRAINING PROGRAM

## 2022-07-31 PROCEDURE — 84100 ASSAY OF PHOSPHORUS: CPT | Performed by: INTERNAL MEDICINE

## 2022-07-31 PROCEDURE — 86140 C-REACTIVE PROTEIN: CPT | Performed by: INTERNAL MEDICINE

## 2022-07-31 PROCEDURE — 80048 BASIC METABOLIC PNL TOTAL CA: CPT | Performed by: INTERNAL MEDICINE

## 2022-07-31 PROCEDURE — 200N000001 HC R&B ICU

## 2022-07-31 PROCEDURE — 85045 AUTOMATED RETICULOCYTE COUNT: CPT | Performed by: INTERNAL MEDICINE

## 2022-07-31 PROCEDURE — 80202 ASSAY OF VANCOMYCIN: CPT | Performed by: INTERNAL MEDICINE

## 2022-07-31 PROCEDURE — 82746 ASSAY OF FOLIC ACID SERUM: CPT | Performed by: INTERNAL MEDICINE

## 2022-07-31 PROCEDURE — 250N000009 HC RX 250: Performed by: STUDENT IN AN ORGANIZED HEALTH CARE EDUCATION/TRAINING PROGRAM

## 2022-07-31 PROCEDURE — 83735 ASSAY OF MAGNESIUM: CPT | Performed by: INTERNAL MEDICINE

## 2022-07-31 PROCEDURE — 250N000009 HC RX 250: Performed by: NURSE ANESTHETIST, CERTIFIED REGISTERED

## 2022-07-31 PROCEDURE — 99233 SBSQ HOSP IP/OBS HIGH 50: CPT | Performed by: INTERNAL MEDICINE

## 2022-07-31 PROCEDURE — 272N000001 HC OR GENERAL SUPPLY STERILE: Performed by: STUDENT IN AN ORGANIZED HEALTH CARE EDUCATION/TRAINING PROGRAM

## 2022-07-31 PROCEDURE — 250N000011 HC RX IP 250 OP 636: Performed by: NURSE ANESTHETIST, CERTIFIED REGISTERED

## 2022-07-31 PROCEDURE — 009630Z DRAINAGE OF CEREBRAL VENTRICLE WITH DRAINAGE DEVICE, PERCUTANEOUS APPROACH: ICD-10-PCS | Performed by: STUDENT IN AN ORGANIZED HEALTH CARE EDUCATION/TRAINING PROGRAM

## 2022-07-31 PROCEDURE — 370N000017 HC ANESTHESIA TECHNICAL FEE, PER MIN: Performed by: STUDENT IN AN ORGANIZED HEALTH CARE EDUCATION/TRAINING PROGRAM

## 2022-07-31 PROCEDURE — 258N000003 HC RX IP 258 OP 636: Performed by: INTERNAL MEDICINE

## 2022-07-31 PROCEDURE — 250N000013 HC RX MED GY IP 250 OP 250 PS 637: Performed by: INTERNAL MEDICINE

## 2022-07-31 PROCEDURE — 70450 CT HEAD/BRAIN W/O DYE: CPT

## 2022-07-31 PROCEDURE — 85379 FIBRIN DEGRADATION QUANT: CPT | Performed by: INTERNAL MEDICINE

## 2022-07-31 PROCEDURE — 999N000190 HC STATISTIC VAT ROUNDS

## 2022-07-31 DEVICE — VENTRICLEAR II VENTRICULAR DRAINAGE CATHETER
Type: IMPLANTABLE DEVICE | Site: CRANIAL | Status: FUNCTIONAL
Brand: VENTRICLEAR

## 2022-07-31 RX ORDER — NALOXONE HYDROCHLORIDE 0.4 MG/ML
0.2 INJECTION, SOLUTION INTRAMUSCULAR; INTRAVENOUS; SUBCUTANEOUS
Status: ACTIVE | OUTPATIENT
Start: 2022-07-31 | End: 2022-08-02

## 2022-07-31 RX ORDER — ONDANSETRON 2 MG/ML
INJECTION INTRAMUSCULAR; INTRAVENOUS PRN
Status: DISCONTINUED | OUTPATIENT
Start: 2022-07-31 | End: 2022-07-31

## 2022-07-31 RX ORDER — CLINDAMYCIN PHOSPHATE 900 MG/50ML
900 INJECTION, SOLUTION INTRAVENOUS SEE ADMIN INSTRUCTIONS
Status: CANCELLED | OUTPATIENT
Start: 2022-07-31

## 2022-07-31 RX ORDER — ONDANSETRON 2 MG/ML
4 INJECTION INTRAMUSCULAR; INTRAVENOUS EVERY 30 MIN PRN
Status: CANCELLED | OUTPATIENT
Start: 2022-07-31

## 2022-07-31 RX ORDER — FENTANYL CITRATE 50 UG/ML
INJECTION, SOLUTION INTRAMUSCULAR; INTRAVENOUS PRN
Status: DISCONTINUED | OUTPATIENT
Start: 2022-07-31 | End: 2022-07-31

## 2022-07-31 RX ORDER — HYDROMORPHONE HCL IN WATER/PF 6 MG/30 ML
0.2 PATIENT CONTROLLED ANALGESIA SYRINGE INTRAVENOUS EVERY 5 MIN PRN
Status: CANCELLED | OUTPATIENT
Start: 2022-07-31

## 2022-07-31 RX ORDER — SODIUM CHLORIDE, SODIUM LACTATE, POTASSIUM CHLORIDE, CALCIUM CHLORIDE 600; 310; 30; 20 MG/100ML; MG/100ML; MG/100ML; MG/100ML
INJECTION, SOLUTION INTRAVENOUS CONTINUOUS
Status: CANCELLED | OUTPATIENT
Start: 2022-07-31

## 2022-07-31 RX ORDER — LIDOCAINE HYDROCHLORIDE 20 MG/ML
INJECTION, SOLUTION INFILTRATION; PERINEURAL PRN
Status: DISCONTINUED | OUTPATIENT
Start: 2022-07-31 | End: 2022-07-31

## 2022-07-31 RX ORDER — MAGNESIUM HYDROXIDE 1200 MG/15ML
LIQUID ORAL PRN
Status: DISCONTINUED | OUTPATIENT
Start: 2022-07-31 | End: 2022-07-31 | Stop reason: HOSPADM

## 2022-07-31 RX ORDER — FENTANYL CITRATE 0.05 MG/ML
25 INJECTION, SOLUTION INTRAMUSCULAR; INTRAVENOUS EVERY 5 MIN PRN
Status: CANCELLED | OUTPATIENT
Start: 2022-07-31

## 2022-07-31 RX ORDER — SODIUM CHLORIDE, SODIUM LACTATE, POTASSIUM CHLORIDE, CALCIUM CHLORIDE 600; 310; 30; 20 MG/100ML; MG/100ML; MG/100ML; MG/100ML
INJECTION, SOLUTION INTRAVENOUS CONTINUOUS PRN
Status: DISCONTINUED | OUTPATIENT
Start: 2022-07-31 | End: 2022-07-31

## 2022-07-31 RX ORDER — CLINDAMYCIN PHOSPHATE 900 MG/50ML
900 INJECTION, SOLUTION INTRAVENOUS
Status: CANCELLED | OUTPATIENT
Start: 2022-07-31

## 2022-07-31 RX ORDER — ALBUMIN, HUMAN INJ 5% 5 %
SOLUTION INTRAVENOUS CONTINUOUS PRN
Status: DISCONTINUED | OUTPATIENT
Start: 2022-07-31 | End: 2022-07-31

## 2022-07-31 RX ORDER — OXYCODONE HYDROCHLORIDE 5 MG/1
5 TABLET ORAL EVERY 4 HOURS PRN
Status: CANCELLED | OUTPATIENT
Start: 2022-07-31

## 2022-07-31 RX ORDER — PROPOFOL 10 MG/ML
INJECTION, EMULSION INTRAVENOUS PRN
Status: DISCONTINUED | OUTPATIENT
Start: 2022-07-31 | End: 2022-07-31

## 2022-07-31 RX ORDER — CEFAZOLIN SODIUM/WATER 2 G/20 ML
2 SYRINGE (ML) INTRAVENOUS
Status: DISCONTINUED | OUTPATIENT
Start: 2022-07-31 | End: 2022-08-01 | Stop reason: CLARIF

## 2022-07-31 RX ORDER — FLUMAZENIL 0.1 MG/ML
0.2 INJECTION, SOLUTION INTRAVENOUS
Status: ACTIVE | OUTPATIENT
Start: 2022-07-31 | End: 2022-08-02

## 2022-07-31 RX ORDER — NALOXONE HYDROCHLORIDE 0.4 MG/ML
0.4 INJECTION, SOLUTION INTRAMUSCULAR; INTRAVENOUS; SUBCUTANEOUS
Status: ACTIVE | OUTPATIENT
Start: 2022-07-31 | End: 2022-08-02

## 2022-07-31 RX ORDER — DEXAMETHASONE SODIUM PHOSPHATE 4 MG/ML
INJECTION, SOLUTION INTRA-ARTICULAR; INTRALESIONAL; INTRAMUSCULAR; INTRAVENOUS; SOFT TISSUE PRN
Status: DISCONTINUED | OUTPATIENT
Start: 2022-07-31 | End: 2022-07-31

## 2022-07-31 RX ORDER — ONDANSETRON 4 MG/1
4 TABLET, ORALLY DISINTEGRATING ORAL EVERY 30 MIN PRN
Status: CANCELLED | OUTPATIENT
Start: 2022-07-31

## 2022-07-31 RX ORDER — BUPIVACAINE HYDROCHLORIDE AND EPINEPHRINE 5; 5 MG/ML; UG/ML
INJECTION, SOLUTION PERINEURAL PRN
Status: DISCONTINUED | OUTPATIENT
Start: 2022-07-31 | End: 2022-07-31 | Stop reason: HOSPADM

## 2022-07-31 RX ADMIN — SODIUM PHOSPHATE, MONOBASIC, MONOHYDRATE 15 MMOL: 276; 142 INJECTION, SOLUTION INTRAVENOUS at 04:41

## 2022-07-31 RX ADMIN — FENTANYL CITRATE 25 MCG: 50 INJECTION, SOLUTION INTRAMUSCULAR; INTRAVENOUS at 18:56

## 2022-07-31 RX ADMIN — VANCOMYCIN HYDROCHLORIDE 1000 MG: 1 INJECTION, SOLUTION INTRAVENOUS at 18:05

## 2022-07-31 RX ADMIN — LEVETIRACETAM 1000 MG: 10 INJECTION INTRAVENOUS at 11:04

## 2022-07-31 RX ADMIN — METRONIDAZOLE 500 MG: 500 INJECTION, SOLUTION INTRAVENOUS at 10:01

## 2022-07-31 RX ADMIN — CEFTRIAXONE SODIUM 2 G: 2 INJECTION, POWDER, FOR SOLUTION INTRAMUSCULAR; INTRAVENOUS at 16:24

## 2022-07-31 RX ADMIN — ACETAMINOPHEN 650 MG: 325 SUSPENSION ORAL at 21:39

## 2022-07-31 RX ADMIN — DEXAMETHASONE SODIUM PHOSPHATE 8 MG: 4 INJECTION, SOLUTION INTRA-ARTICULAR; INTRALESIONAL; INTRAMUSCULAR; INTRAVENOUS; SOFT TISSUE at 19:25

## 2022-07-31 RX ADMIN — METRONIDAZOLE 500 MG: 500 INJECTION, SOLUTION INTRAVENOUS at 16:03

## 2022-07-31 RX ADMIN — SODIUM CHLORIDE, POTASSIUM CHLORIDE, SODIUM LACTATE AND CALCIUM CHLORIDE: 600; 310; 30; 20 INJECTION, SOLUTION INTRAVENOUS at 18:48

## 2022-07-31 RX ADMIN — VANCOMYCIN HYDROCHLORIDE 1000 MG: 1 INJECTION, SOLUTION INTRAVENOUS at 04:24

## 2022-07-31 RX ADMIN — ROCURONIUM BROMIDE 30 MG: 50 INJECTION, SOLUTION INTRAVENOUS at 18:57

## 2022-07-31 RX ADMIN — METRONIDAZOLE 500 MG: 500 INJECTION, SOLUTION INTRAVENOUS at 23:08

## 2022-07-31 RX ADMIN — POTASSIUM CHLORIDE AND SODIUM CHLORIDE: 900; 150 INJECTION, SOLUTION INTRAVENOUS at 01:25

## 2022-07-31 RX ADMIN — LEVETIRACETAM 1000 MG: 10 INJECTION INTRAVENOUS at 21:49

## 2022-07-31 RX ADMIN — ALBUMIN HUMAN: 0.05 INJECTION, SOLUTION INTRAVENOUS at 19:38

## 2022-07-31 RX ADMIN — LIDOCAINE HYDROCHLORIDE 60 MG: 20 INJECTION, SOLUTION INFILTRATION; PERINEURAL at 18:57

## 2022-07-31 RX ADMIN — OXCARBAZEPINE 450 MG: 300 TABLET, FILM COATED ORAL at 23:07

## 2022-07-31 RX ADMIN — PHENYLEPHRINE HYDROCHLORIDE 200 MCG: 10 INJECTION INTRAVENOUS at 19:01

## 2022-07-31 RX ADMIN — PROPOFOL 120 MG: 10 INJECTION, EMULSION INTRAVENOUS at 18:56

## 2022-07-31 RX ADMIN — ONDANSETRON 4 MG: 2 INJECTION INTRAMUSCULAR; INTRAVENOUS at 19:26

## 2022-07-31 RX ADMIN — PANTOPRAZOLE SODIUM 40 MG: 40 INJECTION, POWDER, FOR SOLUTION INTRAVENOUS at 09:59

## 2022-07-31 RX ADMIN — TOPIRAMATE 50 MG: 50 TABLET ORAL at 21:49

## 2022-07-31 RX ADMIN — MORPHINE SULFATE 2 MG: 4 INJECTION, SOLUTION INTRAMUSCULAR; INTRAVENOUS at 22:07

## 2022-07-31 RX ADMIN — PHENYLEPHRINE HYDROCHLORIDE 200 MCG: 10 INJECTION INTRAVENOUS at 19:15

## 2022-07-31 RX ADMIN — METRONIDAZOLE 500 MG: 500 INJECTION, SOLUTION INTRAVENOUS at 00:40

## 2022-07-31 RX ADMIN — SUGAMMADEX 200 MG: 100 INJECTION, SOLUTION INTRAVENOUS at 19:54

## 2022-07-31 RX ADMIN — PHENYLEPHRINE HYDROCHLORIDE 200 MCG: 10 INJECTION INTRAVENOUS at 19:27

## 2022-07-31 RX ADMIN — ACETAMINOPHEN 650 MG: 325 SUSPENSION ORAL at 16:23

## 2022-07-31 RX ADMIN — CEFTRIAXONE SODIUM 2 G: 2 INJECTION, POWDER, FOR SOLUTION INTRAMUSCULAR; INTRAVENOUS at 04:23

## 2022-07-31 RX ADMIN — POTASSIUM CHLORIDE AND SODIUM CHLORIDE: 900; 150 INJECTION, SOLUTION INTRAVENOUS at 21:39

## 2022-07-31 RX ADMIN — PHENYLEPHRINE HYDROCHLORIDE 200 MCG: 10 INJECTION INTRAVENOUS at 19:31

## 2022-07-31 ASSESSMENT — VISUAL ACUITY
OD: NOT TESTED
OS: NOT TESTED
OD: NOT TESTED
OD: NOT TESTED
OS: NOT TESTED
OD: NOT TESTED
OS: NOT TESTED
OD: NOT TESTED
OS: NOT TESTED
OD: NOT TESTED

## 2022-07-31 ASSESSMENT — ACTIVITIES OF DAILY LIVING (ADL)
ADLS_ACUITY_SCORE: 40
ADLS_ACUITY_SCORE: 39
ADLS_ACUITY_SCORE: 40
ADLS_ACUITY_SCORE: 39
ADLS_ACUITY_SCORE: 40

## 2022-07-31 ASSESSMENT — ENCOUNTER SYMPTOMS: SEIZURES: 1

## 2022-07-31 ASSESSMENT — LIFESTYLE VARIABLES: TOBACCO_USE: 1

## 2022-07-31 NOTE — PROGRESS NOTES
Patient tested positive for COVID on 7/19/22.  Dr. Rhodes is in agreement that patient meets criteria for removal of special precautions today.    .MILD TO MODERATE ILLNESS WHO ARE NOT SEVERELY IMMUNOCOMPROMISED    Following criteria for patients who are not  immunocompromised who have mild to moderate     COVID-19 illness, patient meets criteria for discontinuation of Special Precautions:    FOR SYMPTOMATIC - 10 days following symptom onset (day 0 is date of symptom onset),     >24 hr fever free without fever-reducing meds, AND substantial improvement in COVID-19 symptoms.    FOR ASYMPTOMATIC - 10 days from positive test (day 0 is the positive test date) AND no COVID-19 symptom development in 10-day timeframe.    Special Precautions discontinued July 31, 2022. Patient is considered recovered for 90 days from symptom onset.  .7/31/2022  .Toshia Herron, Infection Prevention

## 2022-07-31 NOTE — PLAN OF CARE
Goal Outcome Evaluation:    Plan of Care Reviewed With: patient     Pt here with sepsis and metabolic encephalopathy. Alert, non verbal. Neuros difficult to assess due to pt not following commands. Moving all extremities, withdrawing to stimulus, opens eyes spontaneously. Abrams in place for retention. Tele sinus tach. NGT in place, initiated tube feed Osmolite @10mL/hr, due for rate increase at 0500 increase rate to 20 mL/hr. Free water flushes 30mL q4hrs. Up with A2 lift. Patient denies pain, shakes head no when asked. Pt scoring green on the Aggression Stop Light Tool. Plan pending MRI results. Discharge plan pending.    Patient to MRI this evening.   Potassium replaced, lab recheck 0115.

## 2022-07-31 NOTE — PROGRESS NOTES
[facility]    Medicine Progress Note - Hospitalist Service    Date of Admission:  7/27/2022    Assessment & Plan          Julisa Chaney is a pleasant 77-year-old female with trigeminal neuralgia who presented to [facility] on 7/19 due to severe sepsis (leukocytosis, lactic acidosis, elevated procalcitonin).  She was found to have COVID-19 with superimposed bacterial pneumonia.  She was treated with IV vancomycin and IV meropenem.  Her hospitalization was complicated by severe metabolic encephalopathy.  Head CT on 7/25 showed possible left frontal mass causing vasogenic edema.  MRI obtained 7/27 showed possible left intracerebral abscess with ventriculitis versus neoplasm.  She was switched to IV vancomycin, cefepime and metronidazole and transferred to [facility] for neurosurgery assessment.  Her surveillance MRSA nasal swabs were positive.  MRI brain w/wo contrast 7/30 showed ventriculitis with probable abscess.  Neurosurgery/neurology/infectious disease/oncology consulted.  Patient is being taken to the OR on 7/31 for left frontal ventriculostomy.    #.  Ventriculitis of lateral ventricles L>R, fourth ventricle with abscess of frontal horn of left lateral ventricle  #.  Early leptomeningeal enhancement  - MRI wo CT on 7/27 showed mass (1.8 x 1 x 1 cm)  left frontal horn with surrounding vasogenic edema, probable pus/ventriculitis of occipital horns  - MRI w/wo CT on 7/30 showed Ventriculitis of lateral ventricles (L>R), fourth ventricle along with probable abscess in frontal horn of left lateral ventricle, early leptomeningeal enhancement.  -Initial concern was brain abscess with ventriculitis versus neoplasm no favoring ventriculitis with abscess based on MRI w/wo CT findings.  - Infectious disease consulted- recommended tissue diagnosis with LP vs biopsy.    - Neurosurgery consulted.  Will undergo left frontal ventriculostomy placement today, on 7/31    - if Fluid obtained,  send for Cell count with diff, glucose, protein, aerobic/anaerobic and fungal cultures, meningitis/ encephalitis panel and flow cytometry  - Continue antibiotics-IV vancomycin + ceftriaxone BID + IV metronidazole  - Every 4 hours neurochecks  - Neurology consulted.  video EEG  -Oncology consulted.  Recommended sending CSF for flow cytometry.  Recent CT A/P and chest x-ray did not show any evidence of malignancy.  If malignancy is diagnosed, CT chest will be obtained later.      #.  Acute metabolic encephalopathy  EEG  7/26 -  left frontotemporal region slowing with intermittent sharp wave activity    48 hrs EEG recording -severe diffuse nonspecific encephalopathy with irritability over the posterior head regions. No definite electrographic/clinical seizures  - Continue supportive cares.  Patient now able to open up her eyes and respond somewhat.  Mental status overall improved from before      #.  Severe sepsis due to ventriculitis with abscess  - Had presented with lactate of 4, procalcitonin of 2.11, CRP 26, leukocytosis 26.2  - Chest x-ray was clear but CT abdomen/pelvis showed patchy infiltrate in bibasilar area.  - Treated with IV vancomycin and IV meropenem at [facility]  - Subsequently had CT head and MRI brain that showed probable brain abscess with purulent ventriculitis  -Currently on -IV vancomycin + ceftriaxone BID + IV metronidazole  -Leukocytosis has resolved.  Elevated CRP of 49    #.  Acute COVID-19 illness  -Tested positive on 7/19.  Chest x-ray clear  -Is vaccinated  -Treated with 5 days of remdesivir  -Did not receive dexamethasone as she was not hypoxic  -CRP was 28 on 7/21 and subsequently trended down to 5.7  -D-dimer peaked at 1.91 on 7/21 and then trended down to 0.82 on 7/26  -No respiratory symptoms  -Has completed 10 days of isolation.  Discontinue precautions     #.  Probable seizures  -On Keppra PTA-seems this was prescribed for ataxia and tremors in 2/2022 when she was hospitalized for  aspiration pneumonia.  -EEG obtained at [facility] indicated possible seizures and left frontal lobe  -Now on Keppra 1000 mg IV twice daily (Keppra level on 7/26 was 6 so her home dose was doubled)  -Video EEG so far has not shown any epileptiform spike   -neurology following.  Continue Keppra     #.  Hyponatremia, sodium 129  -Nephrology was consulted at [facility].  SIADH was suspected and she was placed on fluid restrictions.  Sodium improved to 131    #.  Acute worsening of chronic anemia  #.  Chronic iron deficiency and anemia of chronic disease   -Baseline hemoglobin 9.5-10.  Has slowly been decreasing and now down to 7.4.  No obvious bleeding noted.  Decline in hemoglobin could be secondary to hemodilution.   -Folate normal, B12 elevated, ferritin normal at 35  -IBC low and iron saturation low at 6%  -Consider IV iron during hospital stay.  If hemoglobin declines further, transfusion  -Monitor    #.  Trigeminal neuralgia  PTA oxcarbazepine, Topamax     #.  Peptic ulcer disease  EGD 2020 showed gastroduodenitis with duodenal stricture  -IV PPI    #.  Hypoalbuminemia, albumin 2.6    #.  Hypokalemia, potassium 2.8  #.  Hypophosphatemia, phosphorus 1.9  -Continue replacement protocol     # Severe Malnutrition: based on nutrition assessment  - nutrition consulted   -NG placed on 7/30-tube feeds were started start tube feeds.  Tube feeds on hold for neurosurgery procedure  -on NS +20 mg KCl at 75 mill per hour    #.  Lacunar CVA  -MRI showed Diffuse age related changes along with old lacunar infarcts in right thalamus, right side of danyel and focal areas of encephalomalacia in cerebellar hemispheres bilaterally         Diet: Adult Formula Drip Feeding: Continuous Osmolite 1.5; Other - Specify in Comment; Goal Rate: 50; mL/hr; Medication - Feeding Tube Flush Frequency: At least 15-30 mL water before and after medication administration and with tube clogging; Amount to ...  NPO per Anesthesia Guidelines for  Procedure/Surgery Except for: Meds    DVT Prophylaxis: Pneumatic Compression Devices.  Lovenox on hold due to neurosurgery procedure  Abrams Catheter: PRESENT, indication: Retention  Central Lines: PRESENT  PICC Triple Lumen 07/22/22 Right Basilic Vascular access-Site Assessment: WDL  Cardiac Monitoring: ACTIVE order. Indication: ICU  Code Status: Full Code      Disposition Plan      Expected Discharge Date: 08/05/2022        Discharge Comments: Needs NG feeding tube placed      The patient's care was discussed with the Bedside Nurse.    Samanta Rhodes MD  Hospitalist Service  [facility]  Securely message with the Vocera Web Console (learn more here)  Text page via [employer] Paging/Directory         Clinically Significant Risk Factors Present on Admission                      ______________________________________________________________________    Interval History   Patient more responsive today.  Is able to open her eyes when name is called, is able to nod yes/no to some questions.    Tolerating tube feeds.  Tube feeds now placed on hold for neurosurgery procedure   Lovenox continues to be on hold for neurosurgery procedure   Will be transferring to ICU after ventriculostomy placement         Data reviewed today: I reviewed all medications, new labs and imaging results over the last 24 hours. I personally reviewed no images or EKG's today.    Physical Exam   Vital Signs: Temp: 97.2  F (36.2  C) Temp src: Oral BP: 121/64 Pulse: 97   Resp: 16 SpO2: 97 % O2 Device: None (Room air)    Weight: 121 lbs 11.1 oz    Constitutional-opens eyes when name is called, more responsive today.,  Appears  comfortable   Cardiovascular-regular rate and rhythm, no edema  Pulmonary-lungs are clear to auscultation bilaterally, no wheezing or rhonchi  GI-abdomen is soft, nontender, nondistended, no hepatosplenomegaly or masses  Integumentary-skin is warm and dry, no rashes or ulcers  Neurological-no focal deficit         Data   Recent Labs   Lab 07/31/22  0622 07/31/22  0039 07/30/22  2153 07/30/22  1741 07/30/22  1212 07/30/22  0559 07/29/22  1414 07/29/22  0541 07/27/22  2349 07/27/22  2231 07/27/22  2103 07/27/22  0644   WBC 8.2  --   --   --   --  8.2  --  8.3   < > 6.8  --  9.9   HGB 7.4*  --   --   --   --  8.5*  --  9.3*   < > 9.6*  --  9.8*   MCV 72*  --   --   --   --  71*  --  73*   < > 73*  --  72*   *  --   --   --   --  602*  --  578*   < > 547*  --  549*   INR  --   --   --   --   --   --  1.34*  --   --  1.13  --   --    NA  --  131*  --   --   --  133  --  132*   < > 134  --  131*   POTASSIUM  --  4.2  4.2  --  2.9*  --  3.2* 3.5 2.8*   < > 3.6  --  3.5   CHLORIDE  --  100  --   --   --  102  --  98   < > 101  --  99   CO2  --  27  --   --   --  23  --  25   < > 26  --  23   BUN  --  4*  --   --   --  6*  --  11   < > 14  --  11   CR  --  0.37*  --   --   --  0.37*  --  0.42*   < > 0.36*  --  0.54*   ANIONGAP  --  4  --   --   --  8  --  9   < > 7  --  9   ADY  --  8.8  --   --   --  8.1*  --  8.4*   < > 8.3*  --  8.3*   GLC  --  119* 136*  --  99 78  --  100*   < > 105*   < > 110   ALBUMIN  --   --   --   --   --   --   --   --   --  2.6*  --  2.6*   PROTTOTAL  --   --   --   --   --   --   --   --   --  6.4*  --  6.5   BILITOTAL  --   --   --   --   --   --   --   --   --  0.4  --  0.3   ALKPHOS  --   --   --   --   --   --   --   --   --  85  --  89   ALT  --   --   --   --   --   --   --   --   --  16  --  18   AST  --   --   --   --   --   --   --   --   --  11  --  12    < > = values in this interval not displayed.     Recent Results (from the past 24 hour(s))   XR Feeding Tube Placement    Narrative    [facility]  NASOJEJUNAL TUBE PLACEMENT WITH FLUOROSCOPIC GUIDANCE  7/30/2022 3:00 PM    INDICATION: Enteral nutrition.  FLUOROSCOPIC TIME: 5.2 minutes.  CONTRAST: 5  mL.    PROCEDURE: Lidocaine jelly was introduced through the right naris.  Using fluoroscopic guidance, a Kinyarwanda feeding tube  was introduced  through the naris and advanced into the stomach. Attempts were made at  manipulating the feeding tube through the gastric antrum without  success.    Patient tolerated the procedure well without immediate complications.       CONCLUSION:  1.  Fluoroscopic placement of nasogastric feeding tube. Unsuccessful  in advancing of the catheter beyond the pylorus into the duodenum,  despite lengthy attempts.    RADHA VILLA MD         SYSTEM ID:  [id number]   MR Brain w/o & w Contrast    Narrative    EXAM: MR BRAIN W/O and W CONTRAST  LOCATION: [facility]  DATE/TIME: 7/30/2022 8:51 PM    INDICATION: Follow up cerebral abscesses.  COMPARISON: 07/27/2022.  CONTRAST: 15mL Gadavist  TECHNIQUE: MRI brain without and with contrast.    FINDINGS: On the diffusion-weighted images there is restricted diffusion seen within the dependent portions of the lateral ventricles and a focal area involving the frontal horn of the left lateral ventricle.  This is stable in the interval. There is now   restricted diffusion within the dependent portion of the fourth ventricle. There is no other focus of acute ischemia or restricted diffusion. There is no discrete midline shift. There is no acute extra-axial fluid collection or acute intraparenchymal   hemorrhage. There are appropriate flow voids within the cavernous portions of the internal carotid arteries and the basilar artery. The areas of restricted diffusion to show enhancement and there is ependymal enhancement of the lateral ventricles left   greater than right along with the fourth ventricle and possibly within the interpeduncular cistern. This is consistent with ventriculitis. There is motion on axial images leading to suboptimal visualization of fine detail. There may be a mild increase in   cerebral vascularity in the sulci suggestive of possible leptomeningeal enhancement. Recommend follow-up imaging. There is diffuse vasogenic edema  surrounding the frontal horn of the left lateral ventricle where there is enhancement and there is also   mild vasogenic edema along the ventricular system. There are scattered areas of high signal within the periventricular and subcortical white matter consistent with mild diffuse small vessel ischemic disease. There are old lacunar infarcts involving the   right thalamus and the right side of the danyel. There are focal areas of encephalomalacia involving the cerebellar hemispheres bilaterally. The ventricular system, basal cisterns and the cortical sulci are consistent with diffuse volume loss.    There is no evidence of cerebellar tonsillar ectopia. The corpus callosum and the sella region have appropriate configuration and signal intensity for the patient's age. The orbit regions are unremarkable. There is no significant paranasal sinus disease.   The mastoid air cells and the middle ear regions are clear.      Impression    IMPRESSION:   1.  Ventriculitis lateral ventricles left greater than right, fourth fourth ventricle along with probable abscess in the region of the frontal horn of left lateral ventricle.  2. Possible enhancement interpeduncular cistern and increase cerebral vascularity suggestive of early leptomeningeal enhancement.  3.  Diffuse age related changes along with old lacunar infarcts right thalamus, right side of danyel and focal areas of encephalomalacia cerebellar hemispheres bilaterally.   MR Brain w Contrast Stereotactic    Narrative    EXAM: MR BRAIN W CONTRAST STEREOTACTIC  LOCATION: [facility]  DATE/TIME: 7/30/2022 8:54 PM    INDICATION: Ventricular size and cerebral abscess.  COMPARISON: 07/27/2022.  CONTRAST: 15 mL Gadavist  TECHNIQUE: MRI brain was performed with contrast via stereotactic protocol.    FINDINGS: On the enhanced images there is enhancement lining the ependymal surface of the ventricular system primarily the frontal horn of the left lateral  ventricle and the fourth ventricle. There is also enhancement involving the dependent portions of   the lateral ventricles. There is motion on these images the patient had a hard time holding still. There is vasogenic edema surrounding the ventricular system and primarily surrounding the frontal horn of the left lateral ventricle. There is no evidence   of a midline shift. There is stable diffuse age related changes along with old lacunar infarcts right thalamus and right danyel. Multiple areas of encephalomalacia are noted within the cerebellar hemispheres.      Impression    IMPRESSION:  1. Ventriculitis along with focal abscess formation frontal horn left lateral ventricle.  2.  Stable diffuse age related changes.  3.  MRI performed without contrast via stereotactic protocol.     Medications     0.9% sodium chloride + KCl 20 mEq/L 75 mL/hr at 07/31/22 0524     - MEDICATION INSTRUCTIONS -         cefTRIAXone  2 g Intravenous Q12H     levETIRAcetam  1,000 mg Intravenous Q12H     metroNIDAZOLE  500 mg Intravenous Q8H     multivitamins w/minerals  15 mL Per Feeding Tube Daily     OXcarbazepine  450 mg Oral or Feeding Tube At Bedtime     pantoprazole (PROTONIX) IV  40 mg Intravenous Daily with breakfast     sodium chloride (PF)  10-40 mL Intracatheter Q7 Days     topiramate  50 mg Oral or Feeding Tube At Bedtime     vancomycin  1,000 mg Intravenous Q12H

## 2022-07-31 NOTE — PROGRESS NOTES
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    Dx -Meningitis vs malignancy     EEG  7/26 left frontotemporal region slowing with intermittent sharp wave activity     48 hrs EEG recording -severe diffuse nonspecific encephalopathy with irritability over the posterior head regions     No definite electrographic/clinical seizures    PLAN     Patient is plan for ventriculostomy per neurosurgery  CSF analysis pending   Keppra 1000 mg BID    Thank you for the opportunity to provide consultation on Julisa Chaney    HPI    She is a 77 years old female she presented to [facility] emergency department on 7/19/2022 with acute onset of 5 days of diarrhea associated with fatigue lightheadedness with 1 episode of fall at home generalized myalgias and cough in the ED she was febrile initial WBC was elevated.  CT chest abdomen pelvis with bibasilar pneumonia she tested positive for COVID PCR.  She was initiated on remdesivir upon admission.  ID was consulted with superimposed bacterial pneumonia started on Vanco and meropenem, thereafter switched to cefepime Vanco and Flagyl 7/26 over the course of hospitalization she continues to have ongoing severe confusion neurology was consulted EEG was performed showed possible epileptic activity in the left frontal lobe head CT 7/25 showed left frontal lesion with some associated vasogenic edema MR brain with left frontal mass consistent with possible abscess versus neoplasm, purulent ventriculitis she was further transferred here for neurosurgical eval.   Currently followed by ID 7/28, off cefepime currently on Vanco ceftriaxone and Flagyl.     Clinical events - 730  No spells of seizure like activity. EEG - no electrographic seizures    7/31  More alert today and able to follow commands   No recurrent spells   MR brain findings concerning for ventriculitis along with probable abscess in the frontal horn of the left lateral ventricle,Possible enhancement interpeduncular cistern and increase cerebral vascularity suggestive of early leptomeningeal enhancement.    EXAMINATION:   Past medical history, family history, social history and review of systems are unchanged except as noted below.    VITAL SIGNS:   /64 (BP Location: Left arm)   Pulse 97   Temp 97.2  F (36.2  C) (Oral)   Resp 16   Wt 55.2 kg (121 lb 11.1 oz)   SpO2 97%   BMI 20.25 kg/m        PHYSICAL EXAM  No acute distress  Alert and oriented to self, able to follow simple commands  Pupils equal and round, no fixed gaze preference face symmetric  Spontaneous movements noted in the right upper extremity, withdrawal response in both lower limbs   No rhythmic jerking or involuntary movements.  Reflexes 2 upper and lower extremities plantars are equivocal no clonus    PERTINENT DATA:  Lab and X-ray: Recent Labs   Lab Test 07/30/22  0559 07/29/22  1414 07/19/22  1901 05/19/22  1430   WBC 8.2  --    < >  --    HGB 8.5*  --    < >  --    *  --    < >  --    INR  --  1.34*   < >  --    POTASSIUM 3.2* 3.5   < > 4.1   LDL  --   --   --  149*    < > = values in this interval not displayed.     [unfilled]  Recent Labs   Lab Test 07/31/22  0039 07/30/22  1741 07/30/22  0559   POTASSIUM 4.2  4.2 2.9* 3.2*   CHLORIDE 100  --  102   BUN 4*  --  6*     Recent Labs   Lab Test 07/31/22  0622 07/30/22  0559 07/29/22  1414 07/28/22  0408 07/27/22  2231   WBC 8.2 8.2  --    < > 6.8   HGB 7.4* 8.5*  --    < > 9.6*   MCV 72* 71*  --    < > 73*   * 602*  --    < > 547*   INR  --   --  1.34*  --  1.13     < > = values in this interval not displayed.     Recent Labs   Lab Test 07/27/22  2231 07/27/22  0644   AST 11 12   ALT 16 18   ALKPHOS 85 89     Recent Labs   Lab Test 05/19/22  1430 05/04/21  1205   HDL 77 74   * 114     No lab results found.    Laboratory results were personally interpreted and reviewed in detail.    Imaging studies reviewed and interpreted in detail    Summary: List Problems:   Patient Active Problem List   Diagnosis     Osteopenia     Hyperlipidemia     Migraine headache     Trigeminal neuralgia     Counseling regarding advanced directives and goals of care     Controlled substance agreement signed     Hypercalcemia     Chronic pain syndrome     Iron deficiency anemia due to chronic blood loss     Abnormal chest CT     Mild major depression (H)     Pyloric ulcer, chronic     Sepsis (H)     Sepsis, due to unspecified organism, unspecified whether acute organ dysfunction present (H)     Infection due to 2019 novel coronavirus     ORTIZ (acute kidney injury) (H)     Encephalitis     Pyogenic brain abscess     Cerebral abscess

## 2022-07-31 NOTE — PLAN OF CARE
Goal Outcome Evaluation:    Plan of Care Reviewed With: patient     Overall Patient Progress: no change    Outcome Evaluation: Nonverbal, vitals stable, received antibiotics.  Tube feeding rate increased.    DATE & TIME: 7/30/22 2300 - 7/31/22 0700    COGNITION/BEHAVIOR: Alert.  Nonverbal and does not participate in assessment.  Vital signs:  Temp: 97.6  F (36.4  C) Temp src: Axillary BP: 134/74 Pulse: 100   Resp: 16 SpO2: 92 % via RA  TELEMETRY RHYTHM: SR  MOBILITY: Total, turn/repo  PAIN: No indicators of pain present  DIET: Tube feeding; rate increased to 20mL/hr at 0620, due for increase of 15mL at 0620 Monday morning.  RESP: Diminished  CV: Regular, tachycardic  GI/: Flat abdomen, incontinent of stool.  Abrams patent with good output.  PV/NV: Grossly intact, though unable to participate in neuro assessment.  Tolerates PCD's.  On seizure precautions.  SKIN: Red sacrum and heels; heels offloaded with pillows  LINES: Enteral NG tube, RUE PICC, Abrams  LAB/BG: Phos low at 1.9, replacement in progress, due for recheck around noon  OTHER: Continues on Special Precautions

## 2022-07-31 NOTE — PROGRESS NOTES
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 On metronidaole/ceftriaxone/vancomycin, plan for ventriculostomy today - please send aerobic/anaerobic bacterial and fungal cultures.     ID will continue to follow this patient.   Erica Burgos MD  [phone]    ------------------------------------------------------------------------------------------------------------------------------------------------------------------  Subjective/Interval Events:  -Afeb  -MRI suggestive of ventriculitis with focal abscess formation frontal horn left lateral ventricle  -BC ngtd  -Going for ventriculostomy today  Chart checked    Laboratory Data:  Creatinine   Date Value Ref Range Status   07/31/2022 0.37 (L) 0.52 - 1.04 mg/dL Final   07/30/2022 0.37 (L) 0.52 - 1.04 mg/dL Final   07/29/2022 0.42 (L) 0.52 - 1.04 mg/dL Final   07/28/2022 0.44 (L) 0.52 - 1.04 mg/dL Final   07/27/2022 0.36 (L) 0.52 - 1.04 mg/dL Final     WBC Count   Date Value Ref Range Status   07/31/2022 8.2 4.0 - 11.0 10e3/uL Final   07/30/2022 8.2 4.0 - 11.0 10e3/uL Final   07/29/2022 8.3 4.0 - 11.0 10e3/uL Final   07/28/2022 6.4 4.0 - 11.0 10e3/uL Final   07/27/2022 6.8 4.0 - 11.0 10e3/uL Final     Hemoglobin   Date Value Ref Range Status   07/31/2022 7.4 (L) 11.7 - 15.7 g/dL Final     Platelet Count   Date Value Ref Range Status   07/31/2022 523 (H) 150 - 450 10e3/uL Final     Lab Results   Component Value Date     (L) 07/31/2022    BUN 4 (L) 07/31/2022    CO2 27 07/31/2022     CRP Inflammation   Date Value Ref Range Status   07/31/2022 44.4 (H) 0.0 - 8.0 mg/L Final   07/30/2022 43.7 (H) 0.0 - 8.0 mg/L Final   07/29/2022 48.7 (H) 0.0 - 8.0 mg/L Final   07/28/2022 49.1 (H) 0.0 - 8.0 mg/L Final     CRP   Date Value Ref Range Status   07/27/2022 5.7 (H)  0.0 - <0.8 mg/dL Final   07/26/2022 4.6 (H) 0.0 - <0.8 mg/dL Final   07/25/2022 3.7 (H) 0.0 - <0.8 mg/dL Final   07/23/2022 14.2 (H) 0.0 - <0.8 mg/dL Final   07/22/2022 20.9 (H) 0.0 - <0.8 mg/dL Final        Micro:  No results for input(s): CULT, SDES in the last 168 hours.    Imaging:  Recent Results (from the past 48 hour(s))   XR Feeding Tube Placement    [facility]  NASOJEJUNAL TUBE PLACEMENT WITH FLUOROSCOPIC GUIDANCE  7/30/2022 3:00 PM    INDICATION: Enteral nutrition.  FLUOROSCOPIC TIME: 5.2 minutes.  CONTRAST: 5  mL.    PROCEDURE: Lidocaine jelly was introduced through the right naris.  Using fluoroscopic guidance, a Welsh feeding tube was introduced  through the naris and advanced into the stomach. Attempts were made at  manipulating the feeding tube through the gastric antrum without  success.    Patient tolerated the procedure well without immediate complications.       CONCLUSION:  1.  Fluoroscopic placement of nasogastric feeding tube. Unsuccessful  in advancing of the catheter beyond the pylorus into the duodenum,  despite lengthy attempts.    RADHA VILLA MD         SYSTEM ID:  [id number]   MR Brain w/o & w Contrast    Narrative    EXAM: MR BRAIN W/O and W CONTRAST  LOCATION: [facility]  DATE/TIME: 7/30/2022 8:51 PM    INDICATION: Follow up cerebral abscesses.  COMPARISON: 07/27/2022.  CONTRAST: 15mL Gadavist  TECHNIQUE: MRI brain without and with contrast.    FINDINGS: On the diffusion-weighted images there is restricted diffusion seen within the dependent portions of the lateral ventricles and a focal area involving the frontal horn of the left lateral ventricle.  This is stable in the interval. There is now   restricted diffusion within the dependent portion of the fourth ventricle. There is no other focus of acute ischemia or restricted diffusion. There is no discrete midline shift. There is no acute extra-axial fluid collection or acute  intraparenchymal   hemorrhage. There are appropriate flow voids within the cavernous portions of the internal carotid arteries and the basilar artery. The areas of restricted diffusion to show enhancement and there is ependymal enhancement of the lateral ventricles left   greater than right along with the fourth ventricle and possibly within the interpeduncular cistern. This is consistent with ventriculitis. There is motion on axial images leading to suboptimal visualization of fine detail. There may be a mild increase in   cerebral vascularity in the sulci suggestive of possible leptomeningeal enhancement. Recommend follow-up imaging. There is diffuse vasogenic edema surrounding the frontal horn of the left lateral ventricle where there is enhancement and there is also   mild vasogenic edema along the ventricular system. There are scattered areas of high signal within the periventricular and subcortical white matter consistent with mild diffuse small vessel ischemic disease. There are old lacunar infarcts involving the   right thalamus and the right side of the danyel. There are focal areas of encephalomalacia involving the cerebellar hemispheres bilaterally. The ventricular system, basal cisterns and the cortical sulci are consistent with diffuse volume loss.    There is no evidence of cerebellar tonsillar ectopia. The corpus callosum and the sella region have appropriate configuration and signal intensity for the patient's age. The orbit regions are unremarkable. There is no significant paranasal sinus disease.   The mastoid air cells and the middle ear regions are clear.      Impression    IMPRESSION:   1.  Ventriculitis lateral ventricles left greater than right, fourth fourth ventricle along with probable abscess in the region of the frontal horn of left lateral ventricle.  2. Possible enhancement interpeduncular cistern and increase cerebral vascularity suggestive of early leptomeningeal enhancement.  3.   Diffuse age related changes along with old lacunar infarcts right thalamus, right side of danyel and focal areas of encephalomalacia cerebellar hemispheres bilaterally.   MR Brain w Contrast Stereotactic    Narrative    EXAM: MR BRAIN W CONTRAST STEREOTACTIC  LOCATION: [facility]  DATE/TIME: 7/30/2022 8:54 PM    INDICATION: Ventricular size and cerebral abscess.  COMPARISON: 07/27/2022.  CONTRAST: 15 mL Gadavist  TECHNIQUE: MRI brain was performed with contrast via stereotactic protocol.    FINDINGS: On the enhanced images there is enhancement lining the ependymal surface of the ventricular system primarily the frontal horn of the left lateral ventricle and the fourth ventricle. There is also enhancement involving the dependent portions of   the lateral ventricles. There is motion on these images the patient had a hard time holding still. There is vasogenic edema surrounding the ventricular system and primarily surrounding the frontal horn of the left lateral ventricle. There is no evidence   of a midline shift. There is stable diffuse age related changes along with old lacunar infarcts right thalamus and right danyel. Multiple areas of encephalomalacia are noted within the cerebellar hemispheres.      Impression    IMPRESSION:  1. Ventriculitis along with focal abscess formation frontal horn left lateral ventricle.  2.  Stable diffuse age related changes.  3.  MRI performed without contrast via stereotactic protocol.

## 2022-07-31 NOTE — ANESTHESIA PREPROCEDURE EVALUATION
Anesthesia Pre-Procedure Evaluation    Patient: Julisa Chaney   MRN: [id number] : 1945        Procedure : Procedure(s):  VENTRICULOSTOMY, OPEN          Past Medical History:   Diagnosis Date     Aspiration pneumonia (H) 2022     Depression      High cholesterol      Migraines      Pyloric ulcer, chronic      Sepsis (H) 08/10/2020     Trigeminal neuralgia       Past Surgical History:   Procedure Laterality Date     HYSTERECTOMY       PICC TRIPLE LUMEN PLACEMENT  2022          CT ESOPHAGOGASTRODUODENOSCOPY TRANSORAL DIAGNOSTIC N/A 2020    Procedure: ESOPHAGOGASTRODUODENOSCOPY (EGD);  Surgeon: Nathan Smalls MD;  Location: [facility];  Service: Gastroenterology     CT ESOPHAGOGASTRODUODENOSCOPY TRANSORAL DIAGNOSTIC N/A 2020    Procedure: ESOPHAGOGASTRODUODENOSCOPY (EGD), PYLORIC DILATION;  Surgeon: Nathan Smalls MD;  Location: [facility];  Service: Gastroenterology     [facility] COLONOSCOPY W/WO BRUSH/WASH N/A 2020    Procedure: COLONOSCOPY WITH POLYPECTOMY;  Surgeon: Nathan Smalls MD;  Location: [facility];  Service: Gastroenterology      Allergies   Allergen Reactions     Contrast [Iohexol] Rash     Noticed during 2020 admission. Received IV contrast on      Chocolate Headache     Penicillins Rash      Social History     Tobacco Use     Smoking status: Former Smoker     Packs/day: 1.00     Years: 50.00     Pack years: 50.00     Quit date: 2014     Years since quittin.7     Smokeless tobacco: Never Used   Substance Use Topics     Alcohol use: No      Wt Readings from Last 1 Encounters:   22 55.2 kg (121 lb 11.1 oz)        Anesthesia Evaluation            ROS/MED HX  ENT/Pulmonary:     (+) tobacco use, Past use, recent URI, Recent COVID w/ bacterial infection:     Neurologic: Comment: Acute encephalopathy  Cerebral mass vs abscess  Ventriulitis  Trigeminal neuralgia    (+) migraines, seizures, last seizure: recent, CVA,      Cardiovascular:     (+) Dyslipidemia -----    METS/Exercise Tolerance:     Hematologic:     (+) anemia,     Musculoskeletal:       GI/Hepatic:       Renal/Genitourinary:     (+) renal disease, type: ARF,     Endo:       Psychiatric/Substance Use:     (+) psychiatric history depression     Infectious Disease: Comment: sepsis      Malignancy:       Other:            Physical Exam    Airway        Mallampati: III   TM distance: > 3 FB   Neck ROM: limited   Mouth opening: > 3 cm    Respiratory Devices and Support         Dental       (+) upper dentures      Cardiovascular          Rhythm and rate: regular     Pulmonary           breath sounds clear to auscultation       Other findings: Awake but somewhat obtunded.     OUTSIDE LABS:  CBC:   Lab Results   Component Value Date    WBC 8.2 07/31/2022    WBC 8.2 07/30/2022    HGB 7.4 (L) 07/31/2022    HGB 8.5 (L) 07/30/2022    HCT 25.7 (L) 07/31/2022    HCT 29.4 (L) 07/30/2022     (H) 07/31/2022     (H) 07/30/2022     BMP:   Lab Results   Component Value Date     (L) 07/31/2022     07/30/2022    POTASSIUM 4.2 07/31/2022    POTASSIUM 4.2 07/31/2022    CHLORIDE 100 07/31/2022    CHLORIDE 102 07/30/2022    CO2 27 07/31/2022    CO2 23 07/30/2022    BUN 4 (L) 07/31/2022    BUN 6 (L) 07/30/2022    CR 0.37 (L) 07/31/2022    CR 0.37 (L) 07/30/2022     (H) 07/31/2022     (H) 07/30/2022     COAGS:   Lab Results   Component Value Date    PTT 33 07/29/2022    INR 1.34 (H) 07/29/2022     POC: No results found for: BGM, HCG, HCGS  HEPATIC:   Lab Results   Component Value Date    ALBUMIN 2.6 (L) 07/27/2022    PROTTOTAL 6.4 (L) 07/27/2022    ALT 16 07/27/2022    AST 11 07/27/2022    ALKPHOS 85 07/27/2022    BILITOTAL 0.4 07/27/2022    DANELLE 30 07/25/2022     OTHER:   Lab Results   Component Value Date    PH 7.45 07/27/2022    LACT 0.6 (L) 07/27/2022    A1C 5.6 07/19/2022    ADY 8.8 07/31/2022    PHOS 1.9 (L) 07/31/2022    MAG 2.1 07/31/2022    LIPASE  <9 07/19/2022    TSH 0.56 07/25/2022    T4 0.77 02/07/2022    CRP 44.4 (H) 07/31/2022    SED 13 08/08/2020       Anesthesia Plan    ASA Status:  4   NPO Status:  Will be NPO Appropriate at ... (6 pm. Gastric tube feeds stopped at 10am)    Anesthesia Type: General.     - Airway: ETT   Induction: Propofol.   Maintenance: Balanced.        Consents    Anesthesia Plan(s) and associated risks, benefits, and realistic alternatives discussed. Questions answered and patient/representative(s) expressed understanding.    - Discussed:     - Discussed with:  Patient         Postoperative Care    Pain management: Multi-modal analgesia.   PONV prophylaxis: Ondansetron (or other 5HT-3), Dexamethasone or Solumedrol, Background Propofol Infusion     Comments:                Nazia Canela MD

## 2022-07-31 NOTE — PROGRESS NOTES
Neurosurgery progress note:    Brain MRI completed last night and reveals ventriculitis.  On exam, patient awake, aphasic, able to lift both arms and legs up but does not consistently follow commands.  Is able to give thumbs up.  CRP 44.1    Brain MRI 7/30/22  INDICATION: Follow up cerebral abscesses.  COMPARISON: 07/27/2022.  CONTRAST: 15mL Gadavist  TECHNIQUE: MRI brain without and with contrast.     FINDINGS: On the diffusion-weighted images there is restricted diffusion seen within the dependent portions of the lateral ventricles and a focal area involving the frontal horn of the left lateral ventricle.  This is stable in the interval. There is now   restricted diffusion within the dependent portion of the fourth ventricle. There is no other focus of acute ischemia or restricted diffusion. There is no discrete midline shift. There is no acute extra-axial fluid collection or acute intraparenchymal   hemorrhage. There are appropriate flow voids within the cavernous portions of the internal carotid arteries and the basilar artery. The areas of restricted diffusion to show enhancement and there is ependymal enhancement of the lateral ventricles left   greater than right along with the fourth ventricle and possibly within the interpeduncular cistern. This is consistent with ventriculitis. There is motion on axial images leading to suboptimal visualization of fine detail. There may be a mild increase in   cerebral vascularity in the sulci suggestive of possible leptomeningeal enhancement. Recommend follow-up imaging. There is diffuse vasogenic edema surrounding the frontal horn of the left lateral ventricle where there is enhancement and there is also   mild vasogenic edema along the ventricular system. There are scattered areas of high signal within the periventricular and subcortical white matter consistent with mild diffuse small vessel ischemic disease. There are old lacunar infarcts involving the   right thalamus  and the right side of the danyel. There are focal areas of encephalomalacia involving the cerebellar hemispheres bilaterally. The ventricular system, basal cisterns and the cortical sulci are consistent with diffuse volume loss.     There is no evidence of cerebellar tonsillar ectopia. The corpus callosum and the sella region have appropriate configuration and signal intensity for the patient's age. The orbit regions are unremarkable. There is no significant paranasal sinus disease.   The mastoid air cells and the middle ear regions are clear.                                                                      IMPRESSION:   1.  Ventriculitis lateral ventricles left greater than right, fourth fourth ventricle along with probable abscess in the region of the frontal horn of left lateral ventricle.  2. Possible enhancement interpeduncular cistern and increase cerebral vascularity suggestive of early leptomeningeal enhancement.  3.  Diffuse age related changes along with old lacunar infarcts right thalamus, right side of danyel and focal areas of encephalomalacia cerebellar hemispheres bilaterally    PLAN:  Recommend left frontal ventriculostomy to be placed today with Dr. Heredia.  Surgery including risks, goals and possible outcomes discussed and consent obtained with daughter Alissa.  NPO-tube feedings stopped.    Discussed with Dr. Heredia.    Shazia Caban, ANNIE  [facility]  [address]  [address] [place] [id number]    Tel [phone]  Pager [phone]

## 2022-07-31 NOTE — PHARMACY-VANCOMYCIN DOSING SERVICE
Pharmacy Vancomycin Note  Date of Service 2022  Patient's  1945   77 year old, female    Indication: Meningitis  Day of Therapy: 11  Current vancomycin regimen:  1000 mg IV q12h  Current vancomycin monitoring method: Trough (Method 1 = dosing nomogram)  Current vancomycin therapeutic monitoring goal: 15-20 mg/L    Current estimated CrCl = Estimated Creatinine Clearance: 111 mL/min (A) (based on SCr of 0.37 mg/dL (L)).    Creatinine for last 3 days  2022:  5:41 AM Creatinine 0.42 mg/dL  2022:  5:59 AM Creatinine 0.37 mg/dL  2022: 12:39 AM Creatinine 0.37 mg/dL    Recent Vancomycin Levels (past 3 days)  2022:  5:21 PM Vancomycin 18.7 mg/L    Vancomycin IV Administrations (past 72 hours)                   vancomycin (VANCOCIN) 1000 mg in dextrose 5% 200 mL PREMIX (mg) 1,000 mg New Bag 22 0424     1,000 mg New Bag 22 1726     1,000 mg New Bag  0847     1,000 mg New Bag 22 1810     1,000 mg New Bag  0541                Nephrotoxins and other renal medications (From now, onward)    Start     Dose/Rate Route Frequency Ordered Stop    22 0500  vancomycin (VANCOCIN) 1000 mg in dextrose 5% 200 mL PREMIX         1,000 mg  200 mL/hr over 1 Hours Intravenous EVERY 12 HOURS 22 2242               Contrast Orders - past 72 hours (72h ago, onward)    Start     Dose/Rate Route Frequency Stop    22 2100  gadobutrol (GADAVIST) injection 15 mL         15 mL Intravenous ONCE 22 2146    22 1430  iohexol (OMNIPAQUE) 240 mg/mL solution 50 mL         50 mL Oral or GJ tube ONCE 22 1456          Interpretation of levels and current regimen:  Vancomycin level is reflective of therapeutic level  Has serum creatinine changed greater than 50% in last 72 hours: No  Urine output:  Appropriate urine output per rn, not documented yet  Renal Function: Stable    Plan:  1. Continue Current Dose  2. Vancomycin monitoring method: Trough (Method 1 = dosing  nomogram)  3. Vancomycin therapeutic monitoring goal: 15-20 mg/L  4. Pharmacy will check vancomycin levels as appropriate in 3-5 Days.  5. Serum creatinine levels will be ordered daily for the first week of therapy and at least twice weekly for subsequent weeks.    Flakita Barillas RPH

## 2022-08-01 LAB
ANION GAP SERPL CALCULATED.3IONS-SCNC: 5 MMOL/L (ref 3–14)
APPEARANCE CSF: CLEAR
BUN SERPL-MCNC: 3 MG/DL (ref 7–30)
C GATTII+NEOFOR DNA CSF QL NAA+NON-PROBE: NEGATIVE
CALCIUM SERPL-MCNC: 8.8 MG/DL (ref 8.5–10.1)
CHLORIDE BLD-SCNC: 103 MMOL/L (ref 94–109)
CMV DNA CSF QL NAA+NON-PROBE: NEGATIVE
CO2 SERPL-SCNC: 26 MMOL/L (ref 20–32)
COLOR CSF: COLORLESS
CREAT SERPL-MCNC: 0.35 MG/DL (ref 0.52–1.04)
E COLI K1 AG CSF QL: NEGATIVE
ERYTHROCYTE [DISTWIDTH] IN BLOOD BY AUTOMATED COUNT: 19 % (ref 10–15)
EV RNA SPEC QL NAA+PROBE: NEGATIVE
GFR SERPL CREATININE-BSD FRML MDRD: >90 ML/MIN/1.73M2
GLUCOSE BLD-MCNC: 159 MG/DL (ref 70–99)
GLUCOSE BLDC GLUCOMTR-MCNC: 155 MG/DL (ref 70–99)
GLUCOSE CSF-MCNC: 105 MG/DL (ref 40–70)
GP B STREP DNA CSF QL NAA+NON-PROBE: NEGATIVE
HAEM INFLU DNA CSF QL NAA+NON-PROBE: NEGATIVE
HCT VFR BLD AUTO: 29 % (ref 35–47)
HGB BLD-MCNC: 8.3 G/DL (ref 11.7–15.7)
HHV6 DNA CSF QL NAA+NON-PROBE: NEGATIVE
HSV1 DNA CSF QL NAA+NON-PROBE: NEGATIVE
HSV2 DNA CSF QL NAA+NON-PROBE: NEGATIVE
L MONOCYTOG DNA CSF QL NAA+NON-PROBE: NEGATIVE
LYMPH ABN NFR CSF MANUAL: 33 %
MAGNESIUM SERPL-MCNC: 2.1 MG/DL (ref 1.6–2.3)
MCH RBC QN AUTO: 20.9 PG (ref 26.5–33)
MCHC RBC AUTO-ENTMCNC: 28.6 G/DL (ref 31.5–36.5)
MCV RBC AUTO: 73 FL (ref 78–100)
MONOS+MACROS NFR CSF MANUAL: 6 %
N MEN DNA CSF QL NAA+NON-PROBE: NEGATIVE
NEUTROPHILS NFR CSF MANUAL: 61 %
PARECHOVIRUS A RNA CSF QL NAA+NON-PROBE: NEGATIVE
PERFORMING LABORATORY: NORMAL
PHOSPHATE SERPL-MCNC: 2.7 MG/DL (ref 2.5–4.5)
PLATELET # BLD AUTO: 581 10E3/UL (ref 150–450)
POTASSIUM BLD-SCNC: 3.8 MMOL/L (ref 3.4–5.3)
PROT CSF-MCNC: 47 MG/DL (ref 15–60)
RBC # BLD AUTO: 3.97 10E6/UL (ref 3.8–5.2)
RBC # CSF MANUAL: 108 /UL (ref 0–2)
S PNEUM DNA CSF QL NAA+NON-PROBE: NEGATIVE
SODIUM SERPL-SCNC: 134 MMOL/L (ref 133–144)
TEST NAME: NORMAL
TUBE # CSF: 1
VZV DNA CSF QL NAA+NON-PROBE: NEGATIVE
WBC # BLD AUTO: 5.3 10E3/UL (ref 4–11)
WBC # CSF MANUAL: 13 /UL (ref 0–5)

## 2022-08-01 PROCEDURE — 88185 FLOWCYTOMETRY/TC ADD-ON: CPT | Performed by: INTERNAL MEDICINE

## 2022-08-01 PROCEDURE — 87483 CNS DNA AMP PROBE TYPE 12-25: CPT | Performed by: PSYCHIATRY & NEUROLOGY

## 2022-08-01 PROCEDURE — 87075 CULTR BACTERIA EXCEPT BLOOD: CPT | Performed by: PSYCHIATRY & NEUROLOGY

## 2022-08-01 PROCEDURE — 250N000011 HC RX IP 250 OP 636: Performed by: INTERNAL MEDICINE

## 2022-08-01 PROCEDURE — 87070 CULTURE OTHR SPECIMN AEROBIC: CPT | Performed by: PSYCHIATRY & NEUROLOGY

## 2022-08-01 PROCEDURE — 84157 ASSAY OF PROTEIN OTHER: CPT | Performed by: PSYCHIATRY & NEUROLOGY

## 2022-08-01 PROCEDURE — 89051 BODY FLUID CELL COUNT: CPT | Performed by: PSYCHIATRY & NEUROLOGY

## 2022-08-01 PROCEDURE — 83735 ASSAY OF MAGNESIUM: CPT | Performed by: INTERNAL MEDICINE

## 2022-08-01 PROCEDURE — 87493 C DIFF AMPLIFIED PROBE: CPT | Performed by: INTERNAL MEDICINE

## 2022-08-01 PROCEDURE — C9113 INJ PANTOPRAZOLE SODIUM, VIA: HCPCS | Performed by: INTERNAL MEDICINE

## 2022-08-01 PROCEDURE — 85027 COMPLETE CBC AUTOMATED: CPT | Performed by: INTERNAL MEDICINE

## 2022-08-01 PROCEDURE — 999N000190 HC STATISTIC VAT ROUNDS

## 2022-08-01 PROCEDURE — 80048 BASIC METABOLIC PNL TOTAL CA: CPT | Performed by: INTERNAL MEDICINE

## 2022-08-01 PROCEDURE — 88108 CYTOPATH CONCENTRATE TECH: CPT | Mod: TC | Performed by: PSYCHIATRY & NEUROLOGY

## 2022-08-01 PROCEDURE — 88188 FLOWCYTOMETRY/READ 9-15: CPT | Performed by: PATHOLOGY

## 2022-08-01 PROCEDURE — 200N000001 HC R&B ICU

## 2022-08-01 PROCEDURE — 87205 SMEAR GRAM STAIN: CPT | Performed by: PSYCHIATRY & NEUROLOGY

## 2022-08-01 PROCEDURE — 250N000013 HC RX MED GY IP 250 OP 250 PS 637: Performed by: INTERNAL MEDICINE

## 2022-08-01 PROCEDURE — 99232 SBSQ HOSP IP/OBS MODERATE 35: CPT | Performed by: INTERNAL MEDICINE

## 2022-08-01 PROCEDURE — 84100 ASSAY OF PHOSPHORUS: CPT | Performed by: INTERNAL MEDICINE

## 2022-08-01 PROCEDURE — 99231 SBSQ HOSP IP/OBS SF/LOW 25: CPT | Performed by: PHYSICIAN ASSISTANT

## 2022-08-01 PROCEDURE — 82945 GLUCOSE OTHER FLUID: CPT | Performed by: PSYCHIATRY & NEUROLOGY

## 2022-08-01 RX ADMIN — VANCOMYCIN HYDROCHLORIDE 1000 MG: 1 INJECTION, SOLUTION INTRAVENOUS at 07:44

## 2022-08-01 RX ADMIN — MORPHINE SULFATE 2 MG: 4 INJECTION, SOLUTION INTRAMUSCULAR; INTRAVENOUS at 02:05

## 2022-08-01 RX ADMIN — MORPHINE SULFATE 2 MG: 4 INJECTION, SOLUTION INTRAMUSCULAR; INTRAVENOUS at 14:49

## 2022-08-01 RX ADMIN — ACETAMINOPHEN 650 MG: 325 SUSPENSION ORAL at 10:43

## 2022-08-01 RX ADMIN — OXCARBAZEPINE 450 MG: 300 TABLET, FILM COATED ORAL at 21:24

## 2022-08-01 RX ADMIN — CEFTRIAXONE SODIUM 2 G: 2 INJECTION, POWDER, FOR SOLUTION INTRAMUSCULAR; INTRAVENOUS at 16:49

## 2022-08-01 RX ADMIN — LEVETIRACETAM 1000 MG: 10 INJECTION INTRAVENOUS at 09:26

## 2022-08-01 RX ADMIN — POTASSIUM CHLORIDE AND SODIUM CHLORIDE: 900; 150 INJECTION, SOLUTION INTRAVENOUS at 12:10

## 2022-08-01 RX ADMIN — PANTOPRAZOLE SODIUM 40 MG: 40 INJECTION, POWDER, FOR SOLUTION INTRAVENOUS at 08:05

## 2022-08-01 RX ADMIN — ACETAMINOPHEN 650 MG: 325 SUSPENSION ORAL at 06:33

## 2022-08-01 RX ADMIN — METRONIDAZOLE 500 MG: 500 INJECTION, SOLUTION INTRAVENOUS at 23:37

## 2022-08-01 RX ADMIN — METRONIDAZOLE 500 MG: 500 INJECTION, SOLUTION INTRAVENOUS at 15:29

## 2022-08-01 RX ADMIN — MORPHINE SULFATE 2 MG: 4 INJECTION, SOLUTION INTRAMUSCULAR; INTRAVENOUS at 00:18

## 2022-08-01 RX ADMIN — ACETAMINOPHEN 650 MG: 325 SUSPENSION ORAL at 02:05

## 2022-08-01 RX ADMIN — LEVETIRACETAM 1000 MG: 10 INJECTION INTRAVENOUS at 21:24

## 2022-08-01 RX ADMIN — TOPIRAMATE 50 MG: 50 TABLET ORAL at 21:24

## 2022-08-01 RX ADMIN — MORPHINE SULFATE 2 MG: 4 INJECTION, SOLUTION INTRAMUSCULAR; INTRAVENOUS at 04:07

## 2022-08-01 RX ADMIN — CEFTRIAXONE SODIUM 2 G: 2 INJECTION, POWDER, FOR SOLUTION INTRAMUSCULAR; INTRAVENOUS at 04:07

## 2022-08-01 RX ADMIN — VANCOMYCIN HYDROCHLORIDE 1000 MG: 1 INJECTION, SOLUTION INTRAVENOUS at 17:08

## 2022-08-01 RX ADMIN — MORPHINE SULFATE 2 MG: 4 INJECTION, SOLUTION INTRAMUSCULAR; INTRAVENOUS at 09:26

## 2022-08-01 RX ADMIN — METRONIDAZOLE 500 MG: 500 INJECTION, SOLUTION INTRAVENOUS at 08:05

## 2022-08-01 RX ADMIN — Medication 15 ML: at 08:05

## 2022-08-01 ASSESSMENT — ACTIVITIES OF DAILY LIVING (ADL)
ADLS_ACUITY_SCORE: 44
ADLS_ACUITY_SCORE: 44
ADLS_ACUITY_SCORE: 40
ADLS_ACUITY_SCORE: 44
ADLS_ACUITY_SCORE: 40
ADLS_ACUITY_SCORE: 44
ADLS_ACUITY_SCORE: 40
ADLS_ACUITY_SCORE: 40

## 2022-08-01 NOTE — PROVIDER NOTIFICATION
Dr. Moon notified of low urine output - 30 ml urine over last 2 hours. No new orders at this time.

## 2022-08-01 NOTE — OP NOTE
Location: Main or  Attending Surgeon: Brady Heredia MD  Preprocedure diagnosis: Ventriculitis  Postprocedure diagnosis: Same  Procedure:  1.  Left frontal external ventricular drain placement  Indications: The patient is a 77-year-old female with altered mental status.  On imaging she has signs of ventriculitis with a cerebral abscess continuous with the left lateral ventricle.  Given the enlargement of the left lateral ventricle and the continuous nature with the abscess she is indicated for ventricular catheter placement.  Procedure details:  The patient was brought to the main operating, intubated and placed under general anesthesia.  She is placed in the operating table in the supine position with her head on a gel donut.  Kocher's point was identified marked out on the left side.  The hair was clipped around this area.  She is cleaned and prepped in sterile fashion.  Local anesthetic was injected following a timeout.  Using a 10 blade incision was made through the skin.  We then used a high-speed drill to create a bur hole at the site.  The dura was then coagulated using bipolar cautery and a cruciate incision was made using an 11 blade.  The dural leaflets were then coagulated using bipolar cautery and a small corticectomy was made using bipolar cautery once again.  We then passed the catheter down to the foramen of Ramirez and at 6 cm at the brain surface did not identify any CSF coming out of the drain.  This was then removed and repassed once again we once again without any CSF coming.  I then made my trajectory more lateral and on this past was able to get spontaneous flow of CSF which was clear in nature.  No sign of bleeding was identified.  We then passed the catheter through the scalp and secured using a 3-0 Vicryl suture.  The galea was then closed using 3-0 Vicryl suture and the skin was closing staples.  All counts were correct at the end the procedure.  The patient tolerated the procedure well.   She was extubated and transferred the PACU in stable condition.  I was present for the entire procedure.    Brady Heredia MD

## 2022-08-01 NOTE — PLAN OF CARE
Neuro: oriented to self, PERRL, opens eyes to voice, inconsistently follows commands - slow to respond, moves all extremities, ICP ranged 5-11, output ~ 5-10 ml/hr     CV: tele SR/ST with frequent PACs     Resp: LS clear on room air     GI: BS active, no BM this shift, TF restarted at 0200 - able to increase rate to 20 ml/hr at 1400 if tolerating     : narayan with adequate urine output overall throughout shift, however low output between 4-6 AM - see prior note     Skin: EVD dressing with dried drainage, bilateral heels with blanchable erythema, bruising to bilateral arms, red spot on upper mid back - blanchable     Additional: PRN tylenol and morphine for pain, afebrile - bearhugger on, electrolytes WDL - next recheck tmrw AM, no calls from family this shift

## 2022-08-01 NOTE — PLAN OF CARE
Room Air to 2LNC  No critical drips  EVD Output 1-2 mL/Hour  ICP 1-4    Events from this shift:  Seen by and CSF labs drawn by Neurosurgery  C. difficile rule out      Problem: Plan of Care - These are the overarching goals to be used throughout the patient stay.    Goal: Optimal Comfort and Wellbeing  Outcome: Ongoing, Not Progressing  Intervention: Provide Person-Centered Care  Recent Flowsheet Documentation  Taken 8/1/2022 1600 by Jackie Souza RN  Trust Relationship/Rapport:   care explained   reassurance provided  Taken 8/1/2022 1200 by Jackie Souza RN  Trust Relationship/Rapport:   care explained   reassurance provided  Taken 8/1/2022 0800 by Jackie Souza RN  Trust Relationship/Rapport:   care explained   reassurance provided     Problem: Risk for Delirium  Goal: Optimal Coping  Outcome: Ongoing, Not Progressing  Goal: Improved Attention and Thought Clarity  Outcome: Ongoing, Not Progressing  Goal: Improved Sleep  Outcome: Ongoing, Not Progressing     Problem: Infection  Goal: Absence of Infection Signs and Symptoms  Outcome: Ongoing, Not Progressing  Intervention: Prevent or Manage Infection  Recent Flowsheet Documentation  Taken 8/1/2022 1600 by Jackie Souza RN  Isolation Precautions: contact precautions maintained  Taken 8/1/2022 1200 by Jackie Souza RN  Isolation Precautions: contact precautions maintained  Taken 8/1/2022 0800 by Jackie Souza RN  Isolation Precautions: contact precautions maintained     Problem: Plan of Care - These are the overarching goals to be used throughout the patient stay.    Goal: Absence of Hospital-Acquired Illness or Injury  Intervention: Identify and Manage Fall Risk  Recent Flowsheet Documentation  Taken 8/1/2022 1600 by Jackie Souza RN  Safety Promotion/Fall Prevention:   bed alarm on   activity supervised   clutter free environment maintained   fall prevention program maintained   increased rounding and observation   increase visualization  of patient   lighting adjusted   room door open   room organization consistent   safety round/check completed   supervised activity  Taken 8/1/2022 1200 by Jackie Souza RN  Safety Promotion/Fall Prevention:   bed alarm on   activity supervised   clutter free environment maintained   fall prevention program maintained   increased rounding and observation   increase visualization of patient   lighting adjusted   room door open   room organization consistent   safety round/check completed   supervised activity  Taken 8/1/2022 0800 by Jackie Souza RN  Safety Promotion/Fall Prevention:   bed alarm on   activity supervised   clutter free environment maintained   fall prevention program maintained   increased rounding and observation   increase visualization of patient   lighting adjusted   room door open   room organization consistent   safety round/check completed   supervised activity  Intervention: Prevent Skin Injury  Recent Flowsheet Documentation  Taken 8/1/2022 1800 by Jackie Souza RN  Body Position:   turned   left  Taken 8/1/2022 1600 by Jackie Souza, RN  Body Position:   turned   right  Taken 8/1/2022 1400 by Jackie Souza, RN  Body Position:   turned   left  Taken 8/1/2022 1200 by Jackie Souza, RN  Body Position:   turned   right  Taken 8/1/2022 1000 by Jackie Souza, RN  Body Position:   left   turned  Taken 8/1/2022 0800 by Jackie Souza, RN  Body Position:   turned   right  Intervention: Prevent and Manage VTE (Venous Thromboembolism) Risk  Recent Flowsheet Documentation  Taken 8/1/2022 1600 by Jackie Souza RN  VTE Prevention/Management: SCDs (sequential compression devices) on  Activity Management: bedrest  Taken 8/1/2022 1200 by Jackie Souza RN  VTE Prevention/Management: SCDs (sequential compression devices) on  Activity Management: bedrest  Taken 8/1/2022 0800 by Jackie Souza RN  VTE Prevention/Management: SCDs (sequential compression devices) on  Activity  Management: bedrest   Goal Outcome Evaluation:

## 2022-08-01 NOTE — ANESTHESIA PROCEDURE NOTES
Airway       Patient location during procedure: OR       Procedure Start/Stop Times: 7/31/2022 7:00 PM  Staff -        CRNA: Karen Suresh APRN CRNA       Performed By: CRNA  Consent for Airway        Urgency: elective  Indications and Patient Condition       Indications for airway management: khushbu-procedural         Mask difficulty assessment: 1 - vent by mask    Final Airway Details       Final airway type: endotracheal airway       Successful airway: ETT - single, Single subglottic suction and Oral  Endotracheal Airway Details        ETT size (mm): 7.0       Cuffed: yes       Successful intubation technique: direct laryngoscopy       DL Blade Type: Frances 2       Grade View of Cords: 1       Adjucts: stylet       Position: Right       Measured from: lips       Secured at (cm): 22       Bite block used: None    Post intubation assessment        Placement verified by: capnometry, equal breath sounds and chest rise        Number of attempts at approach: 1       Number of other approaches attempted: 0       Secured with: silk tape       Ease of procedure: easy       Dentition: Intact and Unchanged    Medication(s) Administered   Medication Administration Time: 7/31/2022 7:00 PM

## 2022-08-01 NOTE — PROGRESS NOTES
[facility]    Medicine Progress Note - Hospitalist Service       Date of Admission:  7/27/2022  Assessment & Plan   Julisa Chaney is a 77 year-old female with trigeminal neuralgia who presented to [facility] on 7/19 due to severe sepsis (leukocytosis, lactic acidosis, elevated procalcitonin).  She was found to have COVID-19 with superimposed bacterial pneumonia, treated with IV vancomycin and IV meropenem.  Her hospitalization was complicated by severe metabolic encephalopathy.  Head CT on 7/25 showed possible left frontal mass causing vasogenic edema.  MRI obtained 7/27 showed possible left intracerebral abscess with ventriculitis versus neoplasm.  She was switched to IV vancomycin, cefepime and metronidazole and transferred to [facility] for neurosurgery assessment.  MRI brain w/wo contrast 7/30 showed ventriculitis with probable abscess.  Neurosurgery/neurology/infectious disease/oncology consulted.  Patient taken to the OR on 7/31 for left frontal ventriculostomy.    Severe sepsis secondary to ventriculitis with abscess  S/p left frontal ventriculostomy 7/31/22   *Presented with lactate of 4, procalcitonin of 2.11, CRP 26, leukocytosis 26.2.  *CT abdomen/pelvis showed patchy infiltrate in bibasilar area. 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  *Transferred for neurosurgery evaluation, underwent above procedure  - Routine post-operative and drain cares per neurosurgery  - ID following, continues on vancomycin, metronidazole, ceftriaxone IV  - CSF cultures in process  - Oncology consulted, recommended sending CSF for flow cytometry (ordered)     Acute metabolic encephalopathy  Infection, brain mass, acute/critical illness likely contributing  - Re-orient as needed  - Maintain normal day/night, sleep wake cycles  - Minimize sedating/altering medications as able  - Treat separate  conditions as detailed above/below     COVID-19 infection  *Tested positive on 7/19.  Chest x-ray clear.  *Vaccinated  *Completed 5 days of remdesivir  *Did not receive dexamethasone as she was not hypoxic  *CRP was 28 on 7/21 and subsequently trended down to 5.7  *No respiratory symptoms  *Has completed 10 days of isolation. Off precautions.     Probable seizures  *On levetiracetam PTA-seems this was prescribed for ataxia and tremors in 2/2022 when she was hospitalized for aspiration pneumonia.  *EEG obtained at [facility] indicated possible seizures in left frontal lobe  *Levetiracetam level on 7/26 was 6 so her home dose was doubled)  - Continues on levetiracetam     Hyponatremia   Nephrology was consulted at [facility].  SIADH was suspected and she was placed on fluid restrictions.  - Sodium normal 134     Acute on chronic anemia  Chronic iron deficiency and anemia of chronic disease   *Baseline hemoglobin 9.5-10 g/dl.  Has slowly been decreasing and now down to 7.4 g/dl. No obvious bleeding noted. Decline in hemoglobin could be secondary to hemodilution.   *Folate normal, B12 elevated, ferritin normal at 35. TIBC low and iron saturation low at 6%  - Monitor hemoglobin      Trigeminal neuralgia  - Continue PTA oxcarbazepine, topiramate      Peptic ulcer disease  EGD 2020 showed gastroduodenitis with duodenal stricture  - IV PPI     Hypoalbuminemia  Albumin 2.6     Hypokalemia   Hypophosphatemia   - Monitor and replace per protocol      Severe malnutrition  Based on nutrition assessment  - Nutrition consulted   - Continue tube feeds   - Discontinue IVF with tube feeds resumed post-operatively      Chronic lacunar CVA  MRI showed age related changes along with old lacunar infarcts in right thalamus, right side of danyel and focal areas of encephalomalacia in cerebellar hemispheres bilaterally       Clinically Significant Risk Factors Present on Admission                         Diet: Adult Formula Drip Feeding:  Continuous Osmolite 1.5; Other - Specify in Comment; Goal Rate: 50; mL/hr; Medication - Feeding Tube Flush Frequency: At least 15-30 mL water before and after medication administration and with tube clogging; Amount to ...    DVT Prophylaxis: Pneumatic Compression Devices  Abrams Catheter: PRESENT, indication: Strict 1-2 Hour I&O;Retention  Code Status: Full Code      Disposition Plan     Expected Discharge Date: 08/05/2022        Discharge Comments: Needs NG feeding tube placed     Entered: Lorne Hurst MD 08/01/2022, 2:15 PM       The patient's care was discussed with the patient    Lorne Hurst MD  Hospitalist Service  [facility]    ______________________________________________________________________    Interval History   No acute events overnight. Unable to obtain meaningful history from patient due to her encephalopathy.     Data reviewed today: I reviewed all medications, new labs and imaging results over the last 24 hours. I personally reviewed no images or EKG's today.    Physical Exam   Vital Signs: Temp: 98.1  F (36.7  C) Temp src: Axillary BP: 106/56 Pulse: 87   Resp: 18 SpO2: 98 % O2 Device: None (Room air)    Weight: 113 lbs 8.59 oz    Constitutional: NAD  Respiratory: Normal respiratory effort at rest, non-labored breathing   Cardiovascular: RRR   GI: Soft, non-tender, non-distended.    Skin/Integumen: Warm, dry  Neuro: Opens eyes to touch and voice.     Data   Recent Labs   Lab 08/01/22  0011 08/01/22  0006 07/31/22  2123 07/31/22  0622 07/31/22  0039 07/30/22  2153 07/30/22  1741 07/30/22  1212 07/30/22  0559 07/29/22  1414 07/27/22  2349 07/27/22  2231 07/27/22  2103 07/27/22  0644   WBC  --  5.3  --  8.2  --   --   --   --  8.2  --    < > 6.8  --  9.9   HGB  --  8.3*  --  7.4*  --   --   --   --  8.5*  --    < > 9.6*  --  9.8*   MCV  --  73*  --  72*  --   --   --   --  71*  --    < > 73*  --  72*   PLT  --  581*  --  523*  --   --   --   --  602*  --    < > 547*   --  549*   INR  --   --   --   --   --   --   --   --   --  1.34*  --  1.13  --   --    NA  --  134  --   --  131*  --   --   --  133  --    < > 134  --  131*   POTASSIUM  --  3.8  --   --  4.2  4.2  --  2.9*  --  3.2* 3.5   < > 3.6  --  3.5   CHLORIDE  --  103  --   --  100  --   --   --  102  --    < > 101  --  99   CO2  --  26  --   --  27  --   --   --  23  --    < > 26  --  23   BUN  --  3*  --   --  4*  --   --   --  6*  --    < > 14  --  11   CR  --  0.35*  --   --  0.37*  --   --   --  0.37*  --    < > 0.36*  --  0.54*   ANIONGAP  --  5  --   --  4  --   --   --  8  --    < > 7  --  9   ADY  --  8.8  --   --  8.8  --   --   --  8.1*  --    < > 8.3*  --  8.3*   * 159* 132*  --  119*   < >  --    < > 78  --    < > 105*   < > 110   ALBUMIN  --   --   --   --   --   --   --   --   --   --   --  2.6*  --  2.6*   PROTTOTAL  --   --   --   --   --   --   --   --   --   --   --  6.4*  --  6.5   BILITOTAL  --   --   --   --   --   --   --   --   --   --   --  0.4  --  0.3   ALKPHOS  --   --   --   --   --   --   --   --   --   --   --  85  --  89   ALT  --   --   --   --   --   --   --   --   --   --   --  16  --  18   AST  --   --   --   --   --   --   --   --   --   --   --  11  --  12    < > = values in this interval not displayed.       Recent Results (from the past 24 hour(s))   CT Head w/o Contrast    Narrative    EXAM: CT HEAD W/O CONTRAST  LOCATION: [facility]  DATE/TIME: 7/31/2022 10:45 PM    INDICATION: EVD  COMPARISON: MRI 07/30/2022, CT 7/25/2022.  TECHNIQUE: Routine CT Head without IV contrast. Multiplanar reformats. Dose reduction techniques were used.    FINDINGS:  INTRACRANIAL CONTENTS: Left posterior frontal ventriculostomy catheter with tip in the body of left lateral ventricle. Ventricular size unchanged from prior. No intracranial hemorrhage, extraaxial collection, or mass effect.  No CT evidence of acute   infarct. Moderate edema around the left ventricle frontal  horn is unchanged. Mild presumed chronic small vessel ischemic changes. Mild generalized volume loss. No hydrocephalus.     VISUALIZED ORBITS/SINUSES/MASTOIDS: Prior bilateral cataract surgery. Visualized portions of the orbits are otherwise unremarkable. No paranasal sinus mucosal disease. No middle ear or mastoid effusion.    BONES/SOFT TISSUES: No acute abnormality.      Impression    IMPRESSION:  1.  Left frontal ventriculostomy catheter with tip in the body of the left lateral ventricle. Ventricular size is unchanged from prior.  2.  Otherwise unchanged when compared to CT of 7/25/2022.     Medications     0.9% sodium chloride + KCl 20 mEq/L 75 mL/hr at 08/01/22 1210     - MEDICATION INSTRUCTIONS -         cefTRIAXone  2 g Intravenous Q12H     levETIRAcetam  1,000 mg Intravenous Q12H     metroNIDAZOLE  500 mg Intravenous Q8H     multivitamins w/minerals  15 mL Per Feeding Tube Daily     OXcarbazepine  450 mg Oral or Feeding Tube At Bedtime     pantoprazole (PROTONIX) IV  40 mg Intravenous Daily with breakfast     sodium chloride (PF)  10-40 mL Intracatheter Q7 Days     topiramate  50 mg Oral or Feeding Tube At Bedtime     vancomycin  1,000 mg Intravenous Q12H

## 2022-08-01 NOTE — ANESTHESIA CARE TRANSFER NOTE
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    Anesthesia Type:   General     Note:    Oropharynx: oropharynx clear of all foreign objects and spontaneously breathing  Level of Consciousness: awake  Oxygen Supplementation: face mask  Level of Supplemental Oxygen (L/min / FiO2): 8  Independent Airway: airway patency satisfactory and stable  Dentition: dentition unchanged  Vital Signs Stable: post-procedure vital signs reviewed and stable  Report to RN Given: handoff report given  Patient transferred to: PACU  Comments: Transferred to PACU, spontaneous respirations, 10L oxygen via facemask.  All monitors and alarms on and functioning, VSS.  Patient awake, comfortable.  Report to PACU RN.  Handoff Report: Identifed the Patient, Identified the Reponsible Provider, Reviewed the pertinent medical history, Discussed the surgical course, Reviewed Intra-OP anesthesia mangement and issues during anesthesia, Set expectations for post-procedure period and Allowed opportunity for questions and acknowledgement of understanding      Vitals:  Vitals Value Taken Time   BP     Temp     Pulse 86 07/31/22 2011   Resp 16 07/31/22 2011   SpO2 100 % 07/31/22 2011   Vitals shown include unvalidated device data.    Electronically Signed By: DANIELLE Bass CRNA  July 31, 2022  8:12 PM

## 2022-08-01 NOTE — PROGRESS NOTES
CLINICAL NUTRITION SERVICES - REASSESSMENT NOTE      Malnutrition: (7/28)  % Weight Loss:  > 10% in 6 months (severe malnutrition)  % Intake:  </= 50% for >/= 5 days (severe malnutrition)  Subcutaneous Fat Loss:  Unable to determine d/t COVID isolation   Muscle Loss:  Unable to determine d/t COVID isolation   Fluid Retention:  None noted     Malnutrition Diagnosis: Severe malnutrition  In Context of:  Acute illness or injury       EVALUATION OF PROGRESS TOWARD GOALS   Diet:  NPO     Nutrition Support- Enteral:    Type of Feeding Tube: NGT (7/30)  Enteral Frequency:  Continuous  Enteral Regimen: Osmolite 1.5 @ goal 50 mL/hr   Total Enteral Provisions: 1800 kcals (37 kcal/kg), 76 gm pro (1.6 gm/kg), 914 mL H20, 245 gm CHO, no fiber   Free Water Flush: 30 ml q 4 hours (180 mL) - for FT patency     Intake/Tolerance:    Patient started trophic TF on 7/30 & advanced to highest rate of 20 mL/hr thus far. TF held yesterday d/t neurosurgery. Was restarted at 0200 overnight at 10 mL/hr - plan to increase to goal per orders.       ASSESSED NUTRITION NEEDS:  Dosing Weight 48.3 kg   Estimated Energy Needs: 4739-8679 kcals (35-40 Kcal/Kg)  Justification: repletion and underweight  Estimated Protein Needs: 70-95 grams protein (1.5-2 g pro/Kg)  Justification: repletion and hypercatabolism with critical illness  Estimated Fluid Needs: 8574-2972 mL (1 mL/Kcal)  Justification: maintenance      NEW FINDINGS:   Chart reviewed -   S/p L frontal ventriculostomy on 7/31  Room air     Labs reviewed- Lytes WNL, BUN 3 (L)  Recent Labs   Lab 08/01/22  0011 08/01/22  0006 07/31/22  2123 07/31/22  0039 07/30/22  2153 07/30/22  1212   * 159* 132* 119* 136* 99     Meds- certavite for micronutrients, vancomycin, NaCl + KCl 20 mEq/L IVF @ 75 mL/hr   Stool patterns- Last BM x 1 yesterday, x 3 on 7/30, x 1 on 7/29    Weight - 51.5 kg, overall up from admission weight likely 2/2 fluid status vs bed scale fluctuations     Date/Time Weight Weight  Method   08/01/22 0000 51.5 kg (113 lb 8.6 oz) Bed scale   07/31/22 0648 55.2 kg (121 lb 11.1 oz) Bed scale   07/30/22 0134 53.2 kg (117 lb 4.6 oz) Bed scale   07/28/22 0600 49.5 kg (109 lb 2 oz) Bed scale   07/28/22 0000 -- Bed scale   07/27/22 2100 48.3 kg (106 lb 7.7 oz) Bed scale       Previous Goals (7/28):   Diet to advance within the next 24-48 hours and intake will be at least 50-75% meals   Evaluation: Not met - nutriton POC revised & TF started     Previous Nutrition Diagnosis (7/28):   Inadequate protein-energy intake related to NPO as evidenced by meeting 0% needs, limited intake over the past 7-10 days, significant weight loss   Evaluation: Improving, but ongoing - updated below       CURRENT NUTRITION DIAGNOSIS  Inadequate protein-energy intake related to NPO with slow TF advancement d/t refeeding risk as evidenced by TF running at 0-20 mL/hr over the past 48 hours meeting < 50% nutrition needs, limited nutritional intake x 2 weeks, significant weight loss     INTERVENTIONS  Recommendations / Nutrition Prescription  Continue to advance TF towards goal per orders     Implementation  Collaboration and Referral of Nutrition care - patient discussed this morning during interdisciplinary rounds     Goals  TF will reach goal and continue to meet % nutrition needs in the next 72 hours       MONITORING AND EVALUATION:  Progress towards goals will be monitored and evaluated per protocol and Practice Guidelines      Aleena Stoddard RD, LD  ICU RD Pager: [phone]

## 2022-08-01 NOTE — PLAN OF CARE
Goal Outcome Evaluation:    Plan of Care Reviewed With: other (see comments) (chart reviewed, patient discussed in interdisciplinary rounds)     Overall Patient Progress: improving    Outcome Evaluation: TF started on 7/30 and off yesterday d/t surgery. Was restarted at 10 mL/hr overnight with plans to continue advancing as tolerated.    Aleena Stoddard RD, LD

## 2022-08-01 NOTE — PROGRESS NOTES
Neurosurgery progress note    postoperative day 1 status postplacement of left frontal dural external ventricular drain.    RN reports drain is functioning properly, clear CSF seen in the collecting bag, said to 10 cm H2O at the EAM.  Draining about 5-10 mL an hour over the last day.    Exam    Opens eyes to voice, inconsistently follows commands,  Moves all extremities  EVD drain intact, with no drainage at the drain exit site  Pupils equal and reactive to light  Dressing with dried blood, no fresh bleeding  Wiggles toes to command    Assessment    Ventriculitis  Left lateral ventricle abscess  Acute metabolic encephalopathy  Sepsis  Recovered COVID-19  Probable seizure    Plan    Continue EVD at 10 cm H2O at the EAM  CSF collected from proximal port for Laboratory analysis

## 2022-08-01 NOTE — ANESTHESIA POSTPROCEDURE EVALUATION
Patient: Julisa Chaney    Procedure: Procedure(s):  LEFT FRONTAL VENTRICULOSTOMY       Anesthesia Type:  General    Note:  Disposition: Inpatient   Postop Pain Control: Uneventful            Sign Out: Well controlled pain   PONV:    Neuro/Psych: Uneventful            Sign Out: Acceptable/Baseline neuro status   Airway/Respiratory: Uneventful            Sign Out: Acceptable/Baseline resp. status   CV/Hemodynamics: Uneventful            Sign Out: Acceptable CV status   Other NRE: NONE   DID A NON-ROUTINE EVENT OCCUR? No           Last vitals:  Vitals Value Taken Time   /60 07/31/22 2040   Temp     Pulse 89 07/31/22 2047   Resp 25 07/31/22 2047   SpO2 95 % 07/31/22 2047   Vitals shown include unvalidated device data.    Electronically Signed By: Sundar Arreola MD  July 31, 2022  8:58 PM

## 2022-08-01 NOTE — PROGRESS NOTES
Pt admitted post ventric form OR. VSS, BP goal <170. Q2 neuro checks. CT head done, pending results can be q4 neuro check. Able to verbalize some. Can answer yes or no, but inconsistent. AOx1. FC intermittently, slow to respond.  Ventric @ 10,  No drainage. ICP 5-6. Tylenol and morphine given for pain, pt crying and moaning.     Neurosurg updated since arrival to ICU.

## 2022-08-01 NOTE — BRIEF OP NOTE
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  Complications: None.  Implants:   Implant Name Type Inv. Item Serial No.  Lot No. LRB No. Used Action   VentriClear II Ventricular Drainage Catheter    [employer] [id number] 1 Implanted     Brady Heredia MD

## 2022-08-02 LAB
BACTERIA BLD CULT: NO GROWTH
BACTERIA BLD CULT: NO GROWTH
C DIFF TOX B STL QL: POSITIVE
PATH REPORT.COMMENTS IMP SPEC: NORMAL
PATH REPORT.FINAL DX SPEC: NORMAL
PATH REPORT.MICROSCOPIC SPEC OTHER STN: NORMAL
PATH REPORT.RELEVANT HX SPEC: NORMAL
PHOSPHATE SERPL-MCNC: 1.9 MG/DL (ref 2.5–4.5)
PHOSPHATE SERPL-MCNC: 2.4 MG/DL (ref 2.5–4.5)
POTASSIUM BLD-SCNC: 3.6 MMOL/L (ref 3.4–5.3)

## 2022-08-02 PROCEDURE — 250N000009 HC RX 250: Performed by: INTERNAL MEDICINE

## 2022-08-02 PROCEDURE — 250N000011 HC RX IP 250 OP 636: Performed by: INTERNAL MEDICINE

## 2022-08-02 PROCEDURE — 200N000001 HC R&B ICU

## 2022-08-02 PROCEDURE — C9113 INJ PANTOPRAZOLE SODIUM, VIA: HCPCS | Performed by: INTERNAL MEDICINE

## 2022-08-02 PROCEDURE — 99233 SBSQ HOSP IP/OBS HIGH 50: CPT | Performed by: SPECIALIST

## 2022-08-02 PROCEDURE — 999N000190 HC STATISTIC VAT ROUNDS

## 2022-08-02 PROCEDURE — 250N000013 HC RX MED GY IP 250 OP 250 PS 637: Performed by: INTERNAL MEDICINE

## 2022-08-02 PROCEDURE — 84100 ASSAY OF PHOSPHORUS: CPT | Performed by: INTERNAL MEDICINE

## 2022-08-02 PROCEDURE — 258N000003 HC RX IP 258 OP 636: Performed by: INTERNAL MEDICINE

## 2022-08-02 PROCEDURE — 84132 ASSAY OF SERUM POTASSIUM: CPT | Performed by: INTERNAL MEDICINE

## 2022-08-02 PROCEDURE — 36415 COLL VENOUS BLD VENIPUNCTURE: CPT | Performed by: INTERNAL MEDICINE

## 2022-08-02 PROCEDURE — 99232 SBSQ HOSP IP/OBS MODERATE 35: CPT | Performed by: INTERNAL MEDICINE

## 2022-08-02 RX ORDER — OXCARBAZEPINE 60 MG/ML
450 SUSPENSION ORAL AT BEDTIME
Status: DISCONTINUED | OUTPATIENT
Start: 2022-08-02 | End: 2022-10-03

## 2022-08-02 RX ORDER — VANCOMYCIN HYDROCHLORIDE 125 MG/1
125 CAPSULE ORAL 4 TIMES DAILY
Status: DISCONTINUED | OUTPATIENT
Start: 2022-08-02 | End: 2022-08-02

## 2022-08-02 RX ORDER — VANCOMYCIN HYDROCHLORIDE 50 MG/ML
125 KIT ORAL 4 TIMES DAILY
Status: DISPENSED | OUTPATIENT
Start: 2022-08-02 | End: 2022-09-22

## 2022-08-02 RX ADMIN — VANCOMYCIN HYDROCHLORIDE 125 MG: KIT at 13:09

## 2022-08-02 RX ADMIN — VANCOMYCIN HYDROCHLORIDE 1000 MG: 1 INJECTION, SOLUTION INTRAVENOUS at 06:23

## 2022-08-02 RX ADMIN — Medication 15 ML: at 08:07

## 2022-08-02 RX ADMIN — VANCOMYCIN HYDROCHLORIDE 125 MG: KIT at 21:18

## 2022-08-02 RX ADMIN — OXCARBAZEPINE 450 MG: 300 SUSPENSION ORAL at 21:18

## 2022-08-02 RX ADMIN — VANCOMYCIN HYDROCHLORIDE 125 MG: KIT at 17:18

## 2022-08-02 RX ADMIN — LEVETIRACETAM 1000 MG: 10 INJECTION INTRAVENOUS at 09:42

## 2022-08-02 RX ADMIN — MORPHINE SULFATE 2 MG: 4 INJECTION, SOLUTION INTRAMUSCULAR; INTRAVENOUS at 02:38

## 2022-08-02 RX ADMIN — ACETAMINOPHEN 650 MG: 325 SUSPENSION ORAL at 23:40

## 2022-08-02 RX ADMIN — LEVETIRACETAM 1000 MG: 10 INJECTION INTRAVENOUS at 21:18

## 2022-08-02 RX ADMIN — METRONIDAZOLE 500 MG: 500 INJECTION, SOLUTION INTRAVENOUS at 08:07

## 2022-08-02 RX ADMIN — ACETAMINOPHEN 650 MG: 325 SUSPENSION ORAL at 16:19

## 2022-08-02 RX ADMIN — MORPHINE SULFATE 2 MG: 4 INJECTION, SOLUTION INTRAMUSCULAR; INTRAVENOUS at 08:46

## 2022-08-02 RX ADMIN — CEFTRIAXONE SODIUM 2 G: 2 INJECTION, POWDER, FOR SOLUTION INTRAMUSCULAR; INTRAVENOUS at 17:16

## 2022-08-02 RX ADMIN — MORPHINE SULFATE 2 MG: 4 INJECTION, SOLUTION INTRAMUSCULAR; INTRAVENOUS at 21:16

## 2022-08-02 RX ADMIN — MORPHINE SULFATE 2 MG: 4 INJECTION, SOLUTION INTRAMUSCULAR; INTRAVENOUS at 14:27

## 2022-08-02 RX ADMIN — METRONIDAZOLE 500 MG: 500 INJECTION, SOLUTION INTRAVENOUS at 16:03

## 2022-08-02 RX ADMIN — ACETAMINOPHEN 650 MG: 325 SUSPENSION ORAL at 12:14

## 2022-08-02 RX ADMIN — METRONIDAZOLE 500 MG: 500 INJECTION, SOLUTION INTRAVENOUS at 23:42

## 2022-08-02 RX ADMIN — ACETAMINOPHEN 650 MG: 325 SUSPENSION ORAL at 07:00

## 2022-08-02 RX ADMIN — VANCOMYCIN HYDROCHLORIDE 1000 MG: 1 INJECTION, SOLUTION INTRAVENOUS at 18:10

## 2022-08-02 RX ADMIN — SODIUM PHOSPHATE, MONOBASIC, MONOHYDRATE 9 MMOL: 276; 142 INJECTION, SOLUTION INTRAVENOUS at 20:10

## 2022-08-02 RX ADMIN — CEFTRIAXONE SODIUM 2 G: 2 INJECTION, POWDER, FOR SOLUTION INTRAMUSCULAR; INTRAVENOUS at 05:14

## 2022-08-02 RX ADMIN — ACETAMINOPHEN 650 MG: 325 SUSPENSION ORAL at 01:19

## 2022-08-02 RX ADMIN — MORPHINE SULFATE 2 MG: 4 INJECTION, SOLUTION INTRAMUSCULAR; INTRAVENOUS at 18:39

## 2022-08-02 RX ADMIN — VANCOMYCIN HYDROCHLORIDE 125 MG: KIT at 09:41

## 2022-08-02 RX ADMIN — PANTOPRAZOLE SODIUM 40 MG: 40 INJECTION, POWDER, FOR SOLUTION INTRAVENOUS at 08:08

## 2022-08-02 RX ADMIN — SODIUM PHOSPHATE, MONOBASIC, MONOHYDRATE 15 MMOL: 276; 142 INJECTION, SOLUTION INTRAVENOUS at 08:07

## 2022-08-02 RX ADMIN — TOPIRAMATE 50 MG: 50 TABLET ORAL at 21:18

## 2022-08-02 ASSESSMENT — ACTIVITIES OF DAILY LIVING (ADL)
ADLS_ACUITY_SCORE: 44
ADLS_ACUITY_SCORE: 40
ADLS_ACUITY_SCORE: 44
ADLS_ACUITY_SCORE: 40
ADLS_ACUITY_SCORE: 44

## 2022-08-02 NOTE — PLAN OF CARE
Neuro: elda orientation Not answering question, inconsisen with following commands. Purposeful in all extremities. PERRL  CV: SR/ST. BP stable  Resp: 2L NC  GI/: narayan removed due to void. Rectal tube in place. TF changed to 35ml/hr @ 1400.  Skin: blanchable redness, mitts on for grabbing at tubes  Pain/Gtts: morphine and tylenol given for pain  POC: EVD in place 10 at EAM. CVPs 3-5

## 2022-08-02 NOTE — PROGRESS NOTES
[facility]    Neurosurgery  Daily Post-Op Note    Assessment & Plan   Procedure(s):  LEFT FRONTAL VENTRICULOSTOMY   2 Days Post-Op  Alerts to voice and light stim. Not following commands but does move all ext spontaneously.   EVD at 10 cm, continues to drain 5-10 per hour.     Plan:  -Continue supportive and symptomatic treatment  -continue EVD.     Chucho Balderas PA-C    Interval History   Stable. .     Physical Exam   Temp: 97.9  F (36.6  C) Temp src: Axillary BP: 104/49 Pulse: 83   Resp: 20 SpO2: 94 % O2 Device: Nasal cannula Oxygen Delivery: 2 LPM  Vitals:    07/31/22 0648 08/01/22 0000 08/02/22 0000   Weight: 55.2 kg (121 lb 11.1 oz) 51.5 kg (113 lb 8.6 oz) 53.5 kg (117 lb 15.1 oz)     Vital Signs with Ranges  Temp:  [97.5  F (36.4  C)-100  F (37.8  C)] 97.9  F (36.6  C)  Pulse:  [] 83  Resp:  [13-30] 20  BP: ()/(45-98) 104/49  SpO2:  [91 %-98 %] 94 %  I/O last 3 completed shifts:  In: 1544 [I.V.:900; NG/GT:195]  Out: 2160 [Urine:2075; Drains:23; Other:12; Stool:50]    Alerts to voice. Not following commands.   LAINEZ    Drain site: CDI      Medications     - MEDICATION INSTRUCTIONS -          cefTRIAXone  2 g Intravenous Q12H     levETIRAcetam  1,000 mg Intravenous Q12H     metroNIDAZOLE  500 mg Intravenous Q8H     multivitamins w/minerals  15 mL Per Feeding Tube Daily     OXcarbazepine  450 mg Oral or Feeding Tube At Bedtime     pantoprazole (PROTONIX) IV  40 mg Intravenous Daily with breakfast     sodium chloride (PF)  10-40 mL Intracatheter Q7 Days     topiramate  50 mg Oral or Feeding Tube At Bedtime     vancomycin  125 mg Oral or Feeding Tube 4x Daily     vancomycin  1,000 mg Intravenous Q12H           Chucho Balderas PA-C  [facility] Neurosurgery  [address]  [address]  [place] [address]    Tel [phone]  Pager [phone]

## 2022-08-02 NOTE — PROGRESS NOTES
[facility]    Medicine Progress Note - Hospitalist Service       Date of Admission:  7/27/2022  Assessment & Plan   Julisa Chaney is a 77 year-old female with trigeminal neuralgia who presented to [facility] on 7/19 due to severe sepsis (leukocytosis, lactic acidosis, elevated procalcitonin).  She was found to have COVID-19 with superimposed bacterial pneumonia, treated with IV vancomycin and IV meropenem.  Her hospitalization was complicated by severe metabolic encephalopathy.  Head CT on 7/25 showed possible left frontal mass causing vasogenic edema.  MRI obtained 7/27 showed possible left intracerebral abscess with ventriculitis versus neoplasm.  She was switched to IV vancomycin, cefepime and metronidazole and transferred to [facility] for neurosurgery assessment.  MRI brain w/wo contrast 7/30 showed ventriculitis with probable abscess. Neurosurgery/neurology/infectious disease/oncology consulted. Patient taken to the OR on 7/31 for left frontal ventriculostomy.     Severe sepsis secondary to ventriculitis with abscess  S/p left frontal ventriculostomy 7/31/22  *Presented with lactate of 4, procalcitonin of 2.11, CRP 26, leukocytosis 26.2.  *CT abdomen/pelvis showed patchy infiltrate in bibasilar area. 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  *Transferred for neurosurgery evaluation, underwent above procedure  - Routine post-operative and drain cares per neurosurgery  - ID following, continues on vancomycin, metronidazole, ceftriaxone IV  - CSF cultures in process  - Flow cytometry without evidence for malignancy     - Oncology consulted, flow cytometry sent as above     C difficile colitis  *Noted to have copious diarrhea 8/1/22  *C difficile PCR returned positive  - Continue vancomycin PO/FT  - ID following as above   - Continue rectal tube as needed    Acute metabolic  encephalopathy  Infection, brain mass, acute/critical illness likely contributing  - Re-orient as needed  - Maintain normal day/night, sleep wake cycles  - Minimize sedating/altering medications as able  - Treat separate conditions as detailed above/below     COVID-19 infection  *Tested positive on 7/19.  Chest x-ray clear.  *Vaccinated  *Completed 5 days of remdesivir  *Did not receive dexamethasone as she was not hypoxic  *CRP was 28 on 7/21 and subsequently trended down to 5.7  *No respiratory symptoms  *Has completed 10 days of isolation. Off precautions.     Probable seizures  *On levetiracetam PTA-seems this was prescribed for ataxia and tremors in 2/2022 when she was hospitalized for aspiration pneumonia.  *EEG obtained at [facility] indicated possible seizures in left frontal lobe  *Levetiracetam level on 7/26 was 6 so her home dose was doubled)  - Continues on levetiracetam     Hyponatremia   Nephrology was consulted at [facility].  SIADH was suspected and she was placed on fluid restrictions. Sodium subsequently normalized      Acute on chronic anemia  Chronic iron deficiency and anemia of chronic disease   *Baseline hemoglobin 9.5-10 g/dl.  Has slowly been decreasing and now down to 7.4 g/dl. No obvious bleeding noted. Decline in hemoglobin could be secondary to hemodilution.   *Folate normal, B12 elevated, ferritin normal at 35. TIBC low and iron saturation low at 6%  - Monitor hemoglobin      Trigeminal neuralgia  - Continue PTA oxcarbazepine, topiramate      Peptic ulcer disease  EGD 2020 showed gastroduodenitis with duodenal stricture  - IV PPI     Hypoalbuminemia  Albumin 2.6     Hypokalemia   Hypophosphatemia   - Monitor and replace per protocol      Severe malnutrition  Based on nutrition assessment  - Nutrition consulted   - Continue tube feeds      Chronic lacunar CVA  MRI showed age related changes along with old lacunar infarcts in right thalamus, right side of danyel and focal areas of  encephalomalacia in cerebellar hemispheres bilaterally       Clinically Significant Risk Factors Present on Admission                         Diet: Adult Formula Drip Feeding: Continuous Osmolite 1.5; Other - Specify in Comment; Goal Rate: 50; mL/hr; Medication - Feeding Tube Flush Frequency: At least 15-30 mL water before and after medication administration and with tube clogging; Amount to ...    DVT Prophylaxis: Pneumatic Compression Devices  Abrams Catheter: PRESENT, indication: Strict 1-2 Hour I&O  Code Status: Full Code      Disposition Plan     Expected Discharge Date: 08/05/2022        Discharge Comments: Needs NG feeding tube placed     Entered: Lorne Hurst MD 08/02/2022, 3:07 PM       The patient's care was discussed with the patient, daughter, bedside RN    Lorne Hurst MD  Hospitalist Service  [facility]    ______________________________________________________________________    Interval History   No acute events overnight. Unable to obtain meaningful history from patient due to her encephalopathy. Discussed with bedside RN, has had purposeful movements and able to nod yes/no for pain, just received some morphine for pain.     Data reviewed today: I reviewed all medications, new labs and imaging results over the last 24 hours. I personally reviewed no images or EKG's today.    Physical Exam   Vital Signs: Temp: 97.9  F (36.6  C) Temp src: Axillary BP: 104/49 Pulse: 83   Resp: 20 SpO2: 94 % O2 Device: Nasal cannula Oxygen Delivery: 2 LPM  Weight: 117 lbs 15.14 oz    Constitutional: NAD  Respiratory: Normal respiratory effort at rest, non-labored breathing   Cardiovascular: RRR   GI: Soft, non-tender, non-distended.    Skin/Integumen: Warm, dry  Neuro: Opens eyes to touch and voice.     Data   Recent Labs   Lab 08/02/22  0622 08/01/22  0011 08/01/22  0006 07/31/22  2123 07/31/22  0622 07/31/22  0039 07/30/22  1212 07/30/22  0559 07/29/22  1414 07/27/22  6540  07/27/22  2231 07/27/22  2103 07/27/22  0644   WBC  --   --  5.3  --  8.2  --   --  8.2  --    < > 6.8  --  9.9   HGB  --   --  8.3*  --  7.4*  --   --  8.5*  --    < > 9.6*  --  9.8*   MCV  --   --  73*  --  72*  --   --  71*  --    < > 73*  --  72*   PLT  --   --  581*  --  523*  --   --  602*  --    < > 547*  --  549*   INR  --   --   --   --   --   --   --   --  1.34*  --  1.13  --   --    NA  --   --  134  --   --  131*  --  133  --    < > 134  --  131*   POTASSIUM 3.6  --  3.8  --   --  4.2  4.2   < > 3.2* 3.5   < > 3.6  --  3.5   CHLORIDE  --   --  103  --   --  100  --  102  --    < > 101  --  99   CO2  --   --  26  --   --  27  --  23  --    < > 26  --  23   BUN  --   --  3*  --   --  4*  --  6*  --    < > 14  --  11   CR  --   --  0.35*  --   --  0.37*  --  0.37*  --    < > 0.36*  --  0.54*   ANIONGAP  --   --  5  --   --  4  --  8  --    < > 7  --  9   ADY  --   --  8.8  --   --  8.8  --  8.1*  --    < > 8.3*  --  8.3*   GLC  --  155* 159* 132*  --  119*   < > 78  --    < > 105*   < > 110   ALBUMIN  --   --   --   --   --   --   --   --   --   --  2.6*  --  2.6*   PROTTOTAL  --   --   --   --   --   --   --   --   --   --  6.4*  --  6.5   BILITOTAL  --   --   --   --   --   --   --   --   --   --  0.4  --  0.3   ALKPHOS  --   --   --   --   --   --   --   --   --   --  85  --  89   ALT  --   --   --   --   --   --   --   --   --   --  16  --  18   AST  --   --   --   --   --   --   --   --   --   --  11  --  12    < > = values in this interval not displayed.       No results found for this or any previous visit (from the past 24 hour(s)).  Medications     - MEDICATION INSTRUCTIONS -         cefTRIAXone  2 g Intravenous Q12H     levETIRAcetam  1,000 mg Intravenous Q12H     metroNIDAZOLE  500 mg Intravenous Q8H     multivitamins w/minerals  15 mL Per Feeding Tube Daily     OXcarbazepine  450 mg Oral or Feeding Tube At Bedtime     pantoprazole (PROTONIX) IV  40 mg Intravenous Daily with breakfast     sodium  chloride (PF)  10-40 mL Intracatheter Q7 Days     topiramate  50 mg Oral or Feeding Tube At Bedtime     vancomycin  125 mg Oral or Feeding Tube 4x Daily     vancomycin  1,000 mg Intravenous Q12H

## 2022-08-02 NOTE — PROGRESS NOTES
"inconsistently follows commands, LAINEZ spontaneously and localizes to pain. Grabs at ng and ventric tubing and requiring use of mitts to keep from pulling out tubing. Only says \"yes\" to name and pain. No other answers verbally. Patient stares when asked where are you. Pupils PERRL.  ICP 1-4 with minimal output from ventric. Bandage has old, dried blood on it and has no new blood. BP WDL, tachycardic, RR regular and on 2 lpm nc. Tolerating tube feed. C. Diff positive results-notified Dr. Gabriel, rectal tube in place with minimal liquid drainage. Skin integrity maintained.   "

## 2022-08-02 NOTE — PROGRESS NOTES
[facility]    Infectious Disease Progress Note    Date of Service : 08/02/2022      Assessment:  Patient transferred 7/27 from [facility] for concern for brain abscess (also has COVID, is not hypoxic) - MRI suggestive of ventriculitis with left lateral ventricle abscess. On metronidaole/ceftriaxone/vancomycin, s/p left frontal ventriculostomy  cxs with no growth so far. Now has developed C.diff colitis.    Recommendations  1. Follow cx data  2. Continue Vancomycin, Ceftriaxone and Metronidazole  3. Oral vancomycin for C.diff    Nuzhat Hudson MD    Interval History   Patient was seen, chart reviewed  Remains obtunded, has rectal tube now and has developed c.diff    sPhysical Exam   Temp: 97.5  F (36.4  C) Temp src: Axillary BP: 104/50 Pulse: 85   Resp: 19 SpO2: 95 % O2 Device: Nasal cannula Oxygen Delivery: 2 LPM  Vitals:    07/31/22 0648 08/01/22 0000 08/02/22 0000   Weight: 55.2 kg (121 lb 11.1 oz) 51.5 kg (113 lb 8.6 oz) 53.5 kg (117 lb 15.1 oz)     Vital Signs with Ranges  Temp:  [97.5  F (36.4  C)-100  F (37.8  C)] 97.5  F (36.4  C)  Pulse:  [] 85  Resp:  [13-30] 19  BP: ()/(45-98) 104/50  SpO2:  [91 %-98 %] 95 %    Constitutional: obtunded  Lungs: non labored  Cardiovascular: S1S2  Skin: No rash    Other:    Medications     - MEDICATION INSTRUCTIONS -         cefTRIAXone  2 g Intravenous Q12H     levETIRAcetam  1,000 mg Intravenous Q12H     metroNIDAZOLE  500 mg Intravenous Q8H     multivitamins w/minerals  15 mL Per Feeding Tube Daily     OXcarbazepine  450 mg Oral or Feeding Tube At Bedtime     pantoprazole (PROTONIX) IV  40 mg Intravenous Daily with breakfast     sodium chloride (PF)  10-40 mL Intracatheter Q7 Days     sodium phosphate  15 mmol Intravenous Once     topiramate  50 mg Oral or Feeding Tube At Bedtime     vancomycin  125 mg Oral or Feeding Tube 4x Daily     vancomycin  1,000 mg Intravenous Q12H       Data   All microbiology laboratory data reviewed.  Recent Labs    Lab Test 08/01/22  0006 07/31/22  0622 07/30/22  0559   WBC 5.3 8.2 8.2   HGB 8.3* 7.4* 8.5*   HCT 29.0* 25.7* 29.4*   MCV 73* 72* 71*   * 523* 602*     Recent Labs   Lab Test 08/01/22  0006 07/31/22  0039 07/30/22  0559   CR 0.35* 0.37* 0.37*     Recent Labs   Lab Test 08/08/20  0212   SED 13     Component      Latest Ref Rng & Units 8/1/2022   C Difficile Toxin B by PCR      Negative Positive (A)

## 2022-08-03 ENCOUNTER — APPOINTMENT (OUTPATIENT)
Dept: CT IMAGING | Facility: CLINIC | Age: 77
DRG: 023 | End: 2022-08-03
Attending: NURSE PRACTITIONER
Payer: COMMERCIAL

## 2022-08-03 LAB
GLUCOSE BLDC GLUCOMTR-MCNC: 149 MG/DL (ref 70–99)
PATH REPORT.COMMENTS IMP SPEC: NORMAL
PATH REPORT.FINAL DX SPEC: NORMAL
PATH REPORT.GROSS SPEC: NORMAL
PATH REPORT.MICROSCOPIC SPEC OTHER STN: NORMAL
PATH REPORT.RELEVANT HX SPEC: NORMAL
PHOSPHATE SERPL-MCNC: 2.6 MG/DL (ref 2.5–4.5)
POTASSIUM BLD-SCNC: 3.4 MMOL/L (ref 3.4–5.3)
POTASSIUM BLD-SCNC: 3.8 MMOL/L (ref 3.4–5.3)
VANCOMYCIN SERPL-MCNC: 16 MG/L

## 2022-08-03 PROCEDURE — 250N000013 HC RX MED GY IP 250 OP 250 PS 637: Performed by: INTERNAL MEDICINE

## 2022-08-03 PROCEDURE — 250N000011 HC RX IP 250 OP 636: Performed by: INTERNAL MEDICINE

## 2022-08-03 PROCEDURE — 84132 ASSAY OF SERUM POTASSIUM: CPT | Performed by: INTERNAL MEDICINE

## 2022-08-03 PROCEDURE — 80202 ASSAY OF VANCOMYCIN: CPT | Performed by: INTERNAL MEDICINE

## 2022-08-03 PROCEDURE — 36415 COLL VENOUS BLD VENIPUNCTURE: CPT | Performed by: INTERNAL MEDICINE

## 2022-08-03 PROCEDURE — 99232 SBSQ HOSP IP/OBS MODERATE 35: CPT | Performed by: INTERNAL MEDICINE

## 2022-08-03 PROCEDURE — 70450 CT HEAD/BRAIN W/O DYE: CPT

## 2022-08-03 PROCEDURE — 200N000001 HC R&B ICU

## 2022-08-03 PROCEDURE — 84100 ASSAY OF PHOSPHORUS: CPT | Performed by: INTERNAL MEDICINE

## 2022-08-03 PROCEDURE — 999N000190 HC STATISTIC VAT ROUNDS

## 2022-08-03 PROCEDURE — 88108 CYTOPATH CONCENTRATE TECH: CPT | Mod: 26 | Performed by: PATHOLOGY

## 2022-08-03 PROCEDURE — C9113 INJ PANTOPRAZOLE SODIUM, VIA: HCPCS | Performed by: INTERNAL MEDICINE

## 2022-08-03 RX ORDER — POTASSIUM CHLORIDE 20MEQ/15ML
40 LIQUID (ML) ORAL ONCE
Status: COMPLETED | OUTPATIENT
Start: 2022-08-03 | End: 2022-08-03

## 2022-08-03 RX ORDER — OXYCODONE HYDROCHLORIDE 5 MG/1
5 TABLET ORAL EVERY 4 HOURS PRN
Status: DISCONTINUED | OUTPATIENT
Start: 2022-08-03 | End: 2022-08-04

## 2022-08-03 RX ADMIN — Medication 15 ML: at 09:18

## 2022-08-03 RX ADMIN — VANCOMYCIN HYDROCHLORIDE 125 MG: KIT at 13:25

## 2022-08-03 RX ADMIN — ACETAMINOPHEN 650 MG: 325 SUSPENSION ORAL at 21:47

## 2022-08-03 RX ADMIN — METRONIDAZOLE 500 MG: 500 INJECTION, SOLUTION INTRAVENOUS at 23:52

## 2022-08-03 RX ADMIN — VANCOMYCIN HYDROCHLORIDE 125 MG: KIT at 18:51

## 2022-08-03 RX ADMIN — TOPIRAMATE 50 MG: 50 TABLET ORAL at 21:47

## 2022-08-03 RX ADMIN — MORPHINE SULFATE 2 MG: 4 INJECTION, SOLUTION INTRAMUSCULAR; INTRAVENOUS at 11:01

## 2022-08-03 RX ADMIN — CEFTRIAXONE SODIUM 2 G: 2 INJECTION, POWDER, FOR SOLUTION INTRAMUSCULAR; INTRAVENOUS at 16:26

## 2022-08-03 RX ADMIN — METRONIDAZOLE 500 MG: 500 INJECTION, SOLUTION INTRAVENOUS at 15:59

## 2022-08-03 RX ADMIN — ACETAMINOPHEN 650 MG: 325 SUSPENSION ORAL at 09:18

## 2022-08-03 RX ADMIN — MORPHINE SULFATE 2 MG: 4 INJECTION, SOLUTION INTRAMUSCULAR; INTRAVENOUS at 00:47

## 2022-08-03 RX ADMIN — CEFTRIAXONE SODIUM 2 G: 2 INJECTION, POWDER, FOR SOLUTION INTRAMUSCULAR; INTRAVENOUS at 05:25

## 2022-08-03 RX ADMIN — OXCARBAZEPINE 450 MG: 300 SUSPENSION ORAL at 21:47

## 2022-08-03 RX ADMIN — VANCOMYCIN HYDROCHLORIDE 1000 MG: 1 INJECTION, SOLUTION INTRAVENOUS at 18:51

## 2022-08-03 RX ADMIN — PANTOPRAZOLE SODIUM 40 MG: 40 INJECTION, POWDER, FOR SOLUTION INTRAVENOUS at 07:57

## 2022-08-03 RX ADMIN — VANCOMYCIN HYDROCHLORIDE 1000 MG: 1 INJECTION, SOLUTION INTRAVENOUS at 06:12

## 2022-08-03 RX ADMIN — METRONIDAZOLE 500 MG: 500 INJECTION, SOLUTION INTRAVENOUS at 07:57

## 2022-08-03 RX ADMIN — VANCOMYCIN HYDROCHLORIDE 125 MG: KIT at 21:47

## 2022-08-03 RX ADMIN — LEVETIRACETAM 1000 MG: 10 INJECTION INTRAVENOUS at 21:47

## 2022-08-03 RX ADMIN — ACETAMINOPHEN 650 MG: 325 SUSPENSION ORAL at 13:57

## 2022-08-03 RX ADMIN — VANCOMYCIN HYDROCHLORIDE 125 MG: KIT at 09:18

## 2022-08-03 RX ADMIN — OXYCODONE HYDROCHLORIDE 5 MG: 5 TABLET ORAL at 16:26

## 2022-08-03 RX ADMIN — LEVETIRACETAM 1000 MG: 10 INJECTION INTRAVENOUS at 09:18

## 2022-08-03 RX ADMIN — POTASSIUM CHLORIDE 40 MEQ: 20 SOLUTION ORAL at 06:30

## 2022-08-03 ASSESSMENT — VISUAL ACUITY
OD: NOT TESTED
OD: NOT TESTED
OU: NOT TESTED
OS: NOT TESTED
OS: NOT TESTED
OD: NOT TESTED
OS: NOT TESTED
OS: NOT TESTED
OU: NOT TESTED
OS: NOT TESTED
OD: NOT TESTED
OU: NOT TESTED
OD: NOT TESTED
OS: NOT TESTED
OU: NOT TESTED
OD: NOT TESTED

## 2022-08-03 ASSESSMENT — ACTIVITIES OF DAILY LIVING (ADL)
ADLS_ACUITY_SCORE: 42
ADLS_ACUITY_SCORE: 42
ADLS_ACUITY_SCORE: 44
ADLS_ACUITY_SCORE: 42
ADLS_ACUITY_SCORE: 44
ADLS_ACUITY_SCORE: 42
ADLS_ACUITY_SCORE: 44

## 2022-08-03 NOTE — PROGRESS NOTES
"Neuro: More alert and restless today. Many reminders to not pick at ventric site. Patient endorses itching at site. Intermittently in pain and states \"all over.\" sometimes head. Morphine 2 mg IV given twice and tylenol once. Patient follows commands but inconsistent and does not perform more complex commands. Often needs many prompts for simple commands. At times will answer only yes and no questions and intermittently will say more.   CV: SR/ST. One short and self limiting run of SVT to 140 and back to SR.   GI/: rectal tube in place for large amounts of liquid stool-patient has c.diff infections. Enteric precautions maintained. Incontinent of urine with purewick in place.   Skin: blanchable redness to feet, toes. Heels floated off bed on pillows with mepilex in place.   EVD in place with ICP 4-8. Clear drainage from ventric with 0-10 ml drained per hour.   "

## 2022-08-03 NOTE — PHARMACY-VANCOMYCIN DOSING SERVICE
Pharmacy Vancomycin Note  Date of Service August 3, 2022  Patient's  1945   77 year old, female    Indication: Meningitis  Day of Therapy: 14  Current vancomycin regimen:  1000 mg IV q12h  Current vancomycin monitoring method: Trough (Method 1 = dosing nomogram)  Current vancomycin therapeutic monitoring goal: 15-20 mg/L      Current estimated CrCl = Estimated Creatinine Clearance: 113.5 mL/min (A) (based on SCr of 0.35 mg/dL (L)).    Creatinine for last 3 days  2022: 12:06 AM Creatinine 0.35 mg/dL    Recent Vancomycin Levels (past 3 days)  8/3/2022:  5:35 PM Vancomycin 16.0 mg/L    Vancomycin IV Administrations (past 72 hours)                   vancomycin (VANCOCIN) 1000 mg in dextrose 5% 200 mL PREMIX (mg) 1,000 mg New Bag 22 0612     1,000 mg New Bag 22 1810     1,000 mg New Bag  0623     1,000 mg New Bag 22 1708     1,000 mg New Bag  0744                Nephrotoxins and other renal medications (From now, onward)    Start     Dose/Rate Route Frequency Ordered Stop    22 0900  vancomycin (FIRVANQ) oral solution 125 mg         125 mg Oral or Feeding Tube 4 TIMES DAILY 22 0819      22 0500  vancomycin (VANCOCIN) 1000 mg in dextrose 5% 200 mL PREMIX         1,000 mg  200 mL/hr over 1 Hours Intravenous EVERY 12 HOURS 22 2242           Interpretation of levels and current regimen:  Vancomycin level is reflective of therapeutic level  Has serum creatinine changed greater than 50% in last 72 hours: No  Urine output:  good urine output  Renal Function: Stable      Plan:  1. Continue Current Dose  2. Vancomycin monitoring method: Trough (Method 1 = dosing nomogram)  3. Vancomycin therapeutic monitoring goal: 15-20 mg/L  4. Pharmacy will check vancomycin levels as appropriate in 5-7 Days.  5. Serum creatinine levels will be ordered daily for the first week of therapy and at least twice weekly for subsequent weeks.    Flakita Barillas [facility]     DISPLAY PLAN FREE TEXT DISPLAY PLAN FREE TEXT

## 2022-08-03 NOTE — PROGRESS NOTES
[facility]    Medicine Progress Note - Hospitalist Service       Date of Admission:  7/27/2022  Assessment & Plan   Julisa Chaney is a 77 year-old female with trigeminal neuralgia who presented to [facility] on 7/19 due to severe sepsis (leukocytosis, lactic acidosis, elevated procalcitonin).  She was found to have COVID-19 with superimposed bacterial pneumonia, treated with IV vancomycin and IV meropenem.  Her hospitalization was complicated by severe metabolic encephalopathy.  Head CT on 7/25 showed possible left frontal mass causing vasogenic edema.  MRI obtained 7/27 showed possible left intracerebral abscess with ventriculitis versus neoplasm.  She was switched to IV vancomycin, cefepime and metronidazole and transferred to [facility] for neurosurgery assessment.  MRI brain w/wo contrast 7/30 showed ventriculitis with probable abscess. Neurosurgery/neurology/infectious disease/oncology consulted. Patient taken to the OR on 7/31 for left frontal ventriculostomy.     Severe sepsis secondary to ventriculitis with abscess  S/p left frontal ventriculostomy 7/31/22  *Presented with lactate of 4, procalcitonin of 2.11, CRP 26, leukocytosis 26.2.  *CT abdomen/pelvis showed patchy infiltrate in bibasilar area. 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  *Transferred for neurosurgery evaluation, underwent above procedure  *Oncology consulted, flow cytometry without evidence for malignancy     - Routine post-operative and drain cares per neurosurgery  - ID following, continues on vancomycin, metronidazole, ceftriaxone IV  - Repeat head CT ordered by neurosurgery   - CSF cultures in process     C difficile colitis  *Noted to have copious diarrhea 8/1/22  *C difficile PCR returned positive  - Continue vancomycin PO/FT  - ID following as above   - Continue rectal tube as needed    Acute metabolic  encephalopathy  Infection, brain mass, acute/critical illness likely contributing  - Re-orient as needed  - Maintain normal day/night, sleep wake cycles  - Minimize sedating/altering medications as able  - Treat separate conditions as detailed above/below     COVID-19 infection  *Tested positive on 7/19.  Chest x-ray clear.  *Vaccinated  *Completed 5 days of remdesivir  *Did not receive dexamethasone as she was not hypoxic  *CRP was 28 on 7/21 and subsequently trended down to 5.7  *No respiratory symptoms  *Has completed 10 days of isolation. Off precautions.     Probable seizures  *On levetiracetam PTA-seems this was prescribed for ataxia and tremors in 2/2022 when she was hospitalized for aspiration pneumonia.  *EEG obtained at [facility] indicated possible seizures in left frontal lobe  *Levetiracetam level on 7/26 was 6 so her home dose was doubled  - Continues on levetiracetam     Hyponatremia   Nephrology was consulted at [facility].  SIADH was suspected and she was placed on fluid restrictions. Sodium subsequently normalized      Acute on chronic anemia  Chronic iron deficiency and anemia of chronic disease   *Baseline hemoglobin 9.5-10 g/dl.  Has slowly been decreasing and now down to 7.4 g/dl. No obvious bleeding noted. Decline in hemoglobin could be secondary to hemodilution.   *Folate normal, B12 elevated, ferritin normal at 35. TIBC low and iron saturation low at 6%  - Monitor hemoglobin      Trigeminal neuralgia  - Continue PTA oxcarbazepine, topiramate      Peptic ulcer disease  EGD 2020 showed gastroduodenitis with duodenal stricture  - IV PPI     Hypoalbuminemia  Albumin 2.6     Hypokalemia   Hypophosphatemia   - Monitor and replace per protocol      Severe malnutrition  Based on nutrition assessment  - Nutrition consulted   - Continue tube feeds      Chronic lacunar CVA  MRI showed age related changes along with old lacunar infarcts in right thalamus, right side of danyel and focal areas of  encephalomalacia in cerebellar hemispheres bilaterally       Clinically Significant Risk Factors Present on Admission                         Diet: Adult Formula Drip Feeding: Continuous Osmolite 1.5; Other - Specify in Comment; Goal Rate: 50; mL/hr; Medication - Feeding Tube Flush Frequency: At least 15-30 mL water before and after medication administration and with tube clogging; Amount to ...    DVT Prophylaxis: Pneumatic Compression Devices  Abrams Catheter: Not present  Code Status: Full Code      Disposition Plan     Expected Discharge Date: 08/05/2022        Discharge Comments: Needs NG feeding tube placed     Entered: Lorne Hurst MD 08/03/2022, 1:32 PM       The patient's care was discussed with the patient, bedside RN    Lorne Hurst MD  Hospitalist [facility]    ______________________________________________________________________    Interval History   No acute events overnight. Unable to obtain meaningful history from patient due to her encephalopathy, though her level of alertness is improving per RN and she has been able to communicate better especially with yes/no or indicated where she has pain (typically head). Diarrhea seems to be improving per RN.     Data reviewed today: I reviewed all medications, new labs and imaging results over the last 24 hours. I personally reviewed no images or EKG's today.    Physical Exam   Vital Signs: Temp: 98  F (36.7  C) Temp src: Axillary BP: 107/52 Pulse: 87   Resp: 19 SpO2: 97 % O2 Device: Nasal cannula Oxygen Delivery: 1 LPM  Weight: 117 lbs 11.61 oz    Constitutional: NAD  Respiratory: Normal respiratory effort at rest, non-labored breathing   Cardiovascular: RRR   GI: Soft, non-tender, non-distended.    Skin/Integumen: Warm, dry  Neuro: Alert.     Data   Recent Labs   Lab 08/03/22  1228 08/03/22  0552 08/02/22  0622 08/01/22  0011 08/01/22  0006 07/31/22  2123 07/31/22  0622 07/31/22  0039 07/30/22  1212 07/30/22  0559  07/29/22  1414 07/27/22  2349 07/27/22  2231   WBC  --   --   --   --  5.3  --  8.2  --   --  8.2  --    < > 6.8   HGB  --   --   --   --  8.3*  --  7.4*  --   --  8.5*  --    < > 9.6*   MCV  --   --   --   --  73*  --  72*  --   --  71*  --    < > 73*   PLT  --   --   --   --  581*  --  523*  --   --  602*  --    < > 547*   INR  --   --   --   --   --   --   --   --   --   --  1.34*  --  1.13   NA  --   --   --   --  134  --   --  131*  --  133  --    < > 134   POTASSIUM 3.8 3.4 3.6  --  3.8  --   --  4.2  4.2   < > 3.2* 3.5   < > 3.6   CHLORIDE  --   --   --   --  103  --   --  100  --  102  --    < > 101   CO2  --   --   --   --  26  --   --  27  --  23  --    < > 26   BUN  --   --   --   --  3*  --   --  4*  --  6*  --    < > 14   CR  --   --   --   --  0.35*  --   --  0.37*  --  0.37*  --    < > 0.36*   ANIONGAP  --   --   --   --  5  --   --  4  --  8  --    < > 7   ADY  --   --   --   --  8.8  --   --  8.8  --  8.1*  --    < > 8.3*   GLC  --   --   --  155* 159* 132*  --  119*   < > 78  --    < > 105*   ALBUMIN  --   --   --   --   --   --   --   --   --   --   --   --  2.6*   PROTTOTAL  --   --   --   --   --   --   --   --   --   --   --   --  6.4*   BILITOTAL  --   --   --   --   --   --   --   --   --   --   --   --  0.4   ALKPHOS  --   --   --   --   --   --   --   --   --   --   --   --  85   ALT  --   --   --   --   --   --   --   --   --   --   --   --  16   AST  --   --   --   --   --   --   --   --   --   --   --   --  11    < > = values in this interval not displayed.       No results found for this or any previous visit (from the past 24 hour(s)).  Medications     - MEDICATION INSTRUCTIONS -         cefTRIAXone  2 g Intravenous Q12H     levETIRAcetam  1,000 mg Intravenous Q12H     metroNIDAZOLE  500 mg Intravenous Q8H     multivitamins w/minerals  15 mL Per Feeding Tube Daily     OXcarbazepine  450 mg Oral or Feeding Tube At Bedtime     pantoprazole (PROTONIX) IV  40 mg Intravenous Daily with  breakfast     sodium chloride (PF)  10-40 mL Intracatheter Q7 Days     topiramate  50 mg Oral or Feeding Tube At Bedtime     vancomycin  125 mg Oral or Feeding Tube 4x Daily     vancomycin  1,000 mg Intravenous Q12H

## 2022-08-03 NOTE — PROGRESS NOTES
[facility]  Neurosurgery Daily Progress Note    Assessment & Plan   Procedure(s):  LEFT FRONTAL VENTRICULOSTOMY   -3 Days Post-Op  Patient seen in ICU. She opens eyes to voice, PERRL, moves all extremities, following simple commands. EVD intact at 10cm, 0-10 output her hour, ICP 4-8.     Plan:  - Head CT today, then will change EVD to 15cm. Likely clamp EVD on Friday.   - Continue supportive and symptomatic treatment  - Routine wound care   - Abx per ID   - Appreciate assistance from specialties   - NSG will continue to follow     Discussed with Dr. Giovanna Chowdhury, CNP  [facility]  [facility]  [address]  [place] [address]  Tel [phone]  Pager [phone]    Interval History   Stable     Physical Exam   Temp: 98  F (36.7  C) Temp src: Axillary BP: 107/52 Pulse: 87   Resp: 19 SpO2: 97 % O2 Device: Nasal cannula Oxygen Delivery: 1 LPM  Vitals:    08/01/22 0000 08/02/22 0000 08/03/22 0500   Weight: 51.5 kg (113 lb 8.6 oz) 53.5 kg (117 lb 15.1 oz) 53.4 kg (117 lb 11.6 oz)     Vital Signs with Ranges  Temp:  [96.9  F (36.1  C)-98.6  F (37  C)] 98  F (36.7  C)  Pulse:  [] 87  Resp:  [13-29] 19  BP: (103-134)/(48-68) 107/52  SpO2:  [92 %-98 %] 97 %  I/O last 3 completed shifts:  In: 2445 [I.V.:1250; NG/GT:435]  Out: 2355 [Urine:1850; Drains:16; Other:39; Stool:450]    Opens eyes to voice  PERRL  Moves all extremities  Follows simple commands   EVD intact     Medications     - MEDICATION INSTRUCTIONS -          cefTRIAXone  2 g Intravenous Q12H     levETIRAcetam  1,000 mg Intravenous Q12H     metroNIDAZOLE  500 mg Intravenous Q8H     multivitamins w/minerals  15 mL Per Feeding Tube Daily     OXcarbazepine  450 mg Oral or Feeding Tube At Bedtime     pantoprazole (PROTONIX) IV  40 mg Intravenous Daily with breakfast     sodium chloride (PF)  10-40 mL Intracatheter Q7 Days     topiramate  50 mg Oral or Feeding Tube At  Bedtime     vancomycin  125 mg Oral or Feeding Tube 4x Daily     vancomycin  1,000 mg Intravenous Q12H       Claudia Chowdhury CNP  [facility]   [address]  [place] [address]  Tel [phone]  Pager [phone]

## 2022-08-04 LAB
ANION GAP SERPL CALCULATED.3IONS-SCNC: 9 MMOL/L (ref 3–14)
BUN SERPL-MCNC: 7 MG/DL (ref 7–30)
CALCIUM SERPL-MCNC: 8.4 MG/DL (ref 8.5–10.1)
CHLORIDE BLD-SCNC: 100 MMOL/L (ref 94–109)
CO2 SERPL-SCNC: 25 MMOL/L (ref 20–32)
CREAT SERPL-MCNC: 0.38 MG/DL (ref 0.52–1.04)
GFR SERPL CREATININE-BSD FRML MDRD: >90 ML/MIN/1.73M2
GLUCOSE BLD-MCNC: 225 MG/DL (ref 70–99)
GLUCOSE BLDC GLUCOMTR-MCNC: 221 MG/DL (ref 70–99)
PHOSPHATE SERPL-MCNC: 1.8 MG/DL (ref 2.5–4.5)
PHOSPHATE SERPL-MCNC: 2 MG/DL (ref 2.5–4.5)
POTASSIUM BLD-SCNC: 3.9 MMOL/L (ref 3.4–5.3)
POTASSIUM BLD-SCNC: 4 MMOL/L (ref 3.4–5.3)
SODIUM SERPL-SCNC: 134 MMOL/L (ref 133–144)

## 2022-08-04 PROCEDURE — 250N000009 HC RX 250: Performed by: INTERNAL MEDICINE

## 2022-08-04 PROCEDURE — 250N000013 HC RX MED GY IP 250 OP 250 PS 637: Performed by: INTERNAL MEDICINE

## 2022-08-04 PROCEDURE — 99233 SBSQ HOSP IP/OBS HIGH 50: CPT | Performed by: INTERNAL MEDICINE

## 2022-08-04 PROCEDURE — 84132 ASSAY OF SERUM POTASSIUM: CPT | Performed by: INTERNAL MEDICINE

## 2022-08-04 PROCEDURE — 250N000011 HC RX IP 250 OP 636: Performed by: INTERNAL MEDICINE

## 2022-08-04 PROCEDURE — 200N000001 HC R&B ICU

## 2022-08-04 PROCEDURE — 258N000003 HC RX IP 258 OP 636: Performed by: INTERNAL MEDICINE

## 2022-08-04 PROCEDURE — 84100 ASSAY OF PHOSPHORUS: CPT | Performed by: INTERNAL MEDICINE

## 2022-08-04 PROCEDURE — C9113 INJ PANTOPRAZOLE SODIUM, VIA: HCPCS | Performed by: INTERNAL MEDICINE

## 2022-08-04 RX ORDER — PANTOPRAZOLE SODIUM 40 MG/1
40 TABLET, DELAYED RELEASE ORAL
Status: DISCONTINUED | OUTPATIENT
Start: 2022-08-05 | End: 2022-08-04

## 2022-08-04 RX ADMIN — CEFTRIAXONE SODIUM 2 G: 2 INJECTION, POWDER, FOR SOLUTION INTRAMUSCULAR; INTRAVENOUS at 17:09

## 2022-08-04 RX ADMIN — VANCOMYCIN HYDROCHLORIDE 1000 MG: 1 INJECTION, SOLUTION INTRAVENOUS at 05:51

## 2022-08-04 RX ADMIN — VANCOMYCIN HYDROCHLORIDE 125 MG: KIT at 08:35

## 2022-08-04 RX ADMIN — VANCOMYCIN HYDROCHLORIDE 125 MG: KIT at 22:21

## 2022-08-04 RX ADMIN — MORPHINE SULFATE 2 MG: 4 INJECTION, SOLUTION INTRAMUSCULAR; INTRAVENOUS at 22:21

## 2022-08-04 RX ADMIN — OXYCODONE HYDROCHLORIDE 2.5 MG: 5 TABLET ORAL at 23:06

## 2022-08-04 RX ADMIN — TOPIRAMATE 50 MG: 50 TABLET ORAL at 21:09

## 2022-08-04 RX ADMIN — ACETAMINOPHEN 650 MG: 325 SUSPENSION ORAL at 12:04

## 2022-08-04 RX ADMIN — ACETAMINOPHEN 650 MG: 325 SUSPENSION ORAL at 20:47

## 2022-08-04 RX ADMIN — SODIUM PHOSPHATE, MONOBASIC, MONOHYDRATE 15 MMOL: 276; 142 INJECTION, SOLUTION INTRAVENOUS at 06:29

## 2022-08-04 RX ADMIN — METRONIDAZOLE 500 MG: 500 INJECTION, SOLUTION INTRAVENOUS at 23:57

## 2022-08-04 RX ADMIN — LEVETIRACETAM 1000 MG: 10 INJECTION INTRAVENOUS at 21:09

## 2022-08-04 RX ADMIN — LEVETIRACETAM 1000 MG: 10 INJECTION INTRAVENOUS at 09:39

## 2022-08-04 RX ADMIN — VANCOMYCIN HYDROCHLORIDE 125 MG: KIT at 18:17

## 2022-08-04 RX ADMIN — POTASSIUM & SODIUM PHOSPHATES POWDER PACK 280-160-250 MG 1 PACKET: 280-160-250 PACK at 17:09

## 2022-08-04 RX ADMIN — Medication 15 ML: at 08:05

## 2022-08-04 RX ADMIN — OXYCODONE HYDROCHLORIDE 5 MG: 5 TABLET ORAL at 00:07

## 2022-08-04 RX ADMIN — VANCOMYCIN HYDROCHLORIDE 1000 MG: 1 INJECTION, SOLUTION INTRAVENOUS at 18:17

## 2022-08-04 RX ADMIN — OXYCODONE HYDROCHLORIDE 5 MG: 5 TABLET ORAL at 15:54

## 2022-08-04 RX ADMIN — CEFTRIAXONE SODIUM 2 G: 2 INJECTION, POWDER, FOR SOLUTION INTRAMUSCULAR; INTRAVENOUS at 04:11

## 2022-08-04 RX ADMIN — POTASSIUM & SODIUM PHOSPHATES POWDER PACK 280-160-250 MG 1 PACKET: 280-160-250 PACK at 20:48

## 2022-08-04 RX ADMIN — METRONIDAZOLE 500 MG: 500 INJECTION, SOLUTION INTRAVENOUS at 08:30

## 2022-08-04 RX ADMIN — PANTOPRAZOLE SODIUM 40 MG: 40 INJECTION, POWDER, FOR SOLUTION INTRAVENOUS at 08:05

## 2022-08-04 RX ADMIN — OXCARBAZEPINE 450 MG: 300 SUSPENSION ORAL at 21:10

## 2022-08-04 RX ADMIN — VANCOMYCIN HYDROCHLORIDE 125 MG: KIT at 14:45

## 2022-08-04 RX ADMIN — METRONIDAZOLE 500 MG: 500 INJECTION, SOLUTION INTRAVENOUS at 15:54

## 2022-08-04 ASSESSMENT — ACTIVITIES OF DAILY LIVING (ADL)
ADLS_ACUITY_SCORE: 44
ADLS_ACUITY_SCORE: 44
ADLS_ACUITY_SCORE: 42
ADLS_ACUITY_SCORE: 44
ADLS_ACUITY_SCORE: 42
ADLS_ACUITY_SCORE: 44
ADLS_ACUITY_SCORE: 44
ADLS_ACUITY_SCORE: 46
ADLS_ACUITY_SCORE: 44
ADLS_ACUITY_SCORE: 42

## 2022-08-04 NOTE — PROGRESS NOTES
[facility]    Medicine Progress Note - Hospitalist Service       Date of Admission:  7/27/2022  Assessment & Plan   Julisa Chaney is a 77 year-old female with trigeminal neuralgia who presented to [facility] on 7/19 due to severe sepsis (leukocytosis, lactic acidosis, elevated procalcitonin).  She was found to have COVID-19 with superimposed bacterial pneumonia, treated with IV vancomycin and IV meropenem.  Her hospitalization was complicated by severe metabolic encephalopathy.  Head CT on 7/25 showed possible left frontal mass causing vasogenic edema.  MRI obtained 7/27 showed possible left intracerebral abscess with ventriculitis versus neoplasm.  She was switched to IV vancomycin, cefepime and metronidazole and transferred to [facility] for neurosurgery assessment.  MRI brain w/wo contrast 7/30 showed ventriculitis with probable abscess. Neurosurgery/neurology/infectious disease/oncology consulted. Patient taken to the OR on 7/31 for left frontal ventriculostomy.     Severe sepsis secondary to ventriculitis with abscess  Subdural fluid collection   S/p left frontal ventriculostomy 7/31/22  *Presented with lactate of 4, procalcitonin of 2.11, CRP 26, leukocytosis 26.2.  *CT abdomen/pelvis showed patchy infiltrate in bibasilar area. Initially treated with broad spectrum antibiotics.  *Subsequent head imaging concerning for brain abscess with purulent ventriculitis.  *Transferred for neurosurgery evaluation, underwent above procedure  *Oncology consulted, flow cytometry without evidence for malignancy     - Routine post-operative and drain cares per neurosurgery. Plan to clamp EVD 8/5/22 per neurosurgery.   - ID following, continues on vancomycin, metronidazole, ceftriaxone IV  - Repeat head CT 8/3/22 with markedly decompressed left lateral ventricle, new hypodense subdural fluid collections representing subdural hygromas or hematomas. Follow-up per neurosurgery.   - CSF cultures  in process, NGTD      C difficile colitis  *Noted to have copious diarrhea 8/1/22  *C difficile PCR returned positive  - Continue vancomycin PO/FT  - ID following as above   - Rectal tube as needed    Acute metabolic encephalopathy  Infection, brain mass, acute/critical illness likely contributing  - Re-orient as needed  - Maintain normal day/night, sleep wake cycles  - Minimize sedating/altering medications as able  - Treat separate conditions as detailed above/below     COVID-19 infection  *Tested positive on 7/19.  Chest x-ray clear.  *Vaccinated  *Completed 5 days of remdesivir  *Did not receive dexamethasone as she was not hypoxic  *CRP was 28 on 7/21 and subsequently trended down to 5.7  *No respiratory symptoms  *Has completed 10 days of isolation. Off precautions.     Probable seizures  *On levetiracetam PTA-seems this was prescribed for ataxia and tremors in 2/2022 when she was hospitalized for aspiration pneumonia.  *EEG obtained at [facility] indicated possible seizures in left frontal lobe  *Levetiracetam level on 7/26 was 6 so her home dose was doubled  - Continues on levetiracetam     Hyponatremia   Nephrology was consulted at [facility].  SIADH was suspected and she was placed on fluid restrictions. Sodium subsequently normalized      Acute on chronic anemia  Chronic iron deficiency and anemia of chronic disease   *Baseline hemoglobin 9.5-10 g/dl.  Has slowly been decreasing and now down to 7.4 g/dl. No obvious bleeding noted. Decline in hemoglobin could be secondary to hemodilution.   *Folate normal, B12 elevated, ferritin normal at 35. TIBC low and iron saturation low at 6%  - Monitor hemoglobin      Trigeminal neuralgia  - Continue PTA oxcarbazepine, topiramate      Peptic ulcer disease  EGD 2020 showed gastroduodenitis with duodenal stricture  - Change PPI to PO/FT     Hypoalbuminemia  Albumin 2.6.     Hypokalemia   Hypophosphatemia   - Monitor and replace per protocol      Severe  malnutrition  Based on nutrition assessment  - Nutrition consulted   - Continue tube feeds      Chronic lacunar CVA  MRI showed age related changes along with old lacunar infarcts in right thalamus, right side of danyel and focal areas of encephalomalacia in cerebellar hemispheres bilaterally       Clinically Significant Risk Factors Present on Admission                         Diet: Adult Formula Drip Feeding: Continuous Osmolite 1.5; Other - Specify in Comment; Goal Rate: 50; mL/hr; Medication - Feeding Tube Flush Frequency: At least 15-30 mL water before and after medication administration and with tube clogging; Amount to ...    DVT Prophylaxis: Pneumatic Compression Devices  Abrams Catheter: Not present  Code Status: Full Code      Disposition Plan      Expected Discharge Date: 08/08/2022             Entered: Lorne Hurst MD 08/04/2022, 3:18 PM       The patient's care was discussed with the patient, bedside RN    Lorne Hurst MD  Hospitalist Service  [facility]    ______________________________________________________________________    Interval History   No acute events overnight.  Level of alertness continues to wax and wane.  Patient nods her head yes to having headache.  Amount of diarrhea improving per RN.    Data reviewed today: I reviewed all medications, new labs and imaging results over the last 24 hours. I personally reviewed no images or EKG's today.    Physical Exam   Vital Signs: Temp: 98.9  F (37.2  C) Temp src: Axillary BP: 138/77 Pulse: 112   Resp: 25 SpO2: 100 %      Weight: 117 lbs 1.03 oz    Constitutional: NAD  Respiratory: Normal respiratory effort at rest, non-labored breathing   Cardiovascular: RRR   GI: Soft, non-tender, non-distended.    Skin/Integumen: Warm, dry  Neuro: Alert. Nods heads yes/no to questions, moving extremities on command.     Data   Recent Labs   Lab 08/04/22  0544 08/03/22  1228 08/03/22  1227 08/03/22  0552 08/02/22  0622  08/01/22  0011 08/01/22  0006 07/31/22  2123 07/31/22  0622 07/31/22  0039 07/30/22  1212 07/30/22  0559 07/29/22  1414   WBC  --   --   --   --   --   --  5.3  --  8.2  --   --  8.2  --    HGB  --   --   --   --   --   --  8.3*  --  7.4*  --   --  8.5*  --    MCV  --   --   --   --   --   --  73*  --  72*  --   --  71*  --    PLT  --   --   --   --   --   --  581*  --  523*  --   --  602*  --    INR  --   --   --   --   --   --   --   --   --   --   --   --  1.34*   NA  --   --   --   --   --   --  134  --   --  131*  --  133  --    POTASSIUM 4.0 3.8  --  3.4   < >  --  3.8  --   --  4.2  4.2   < > 3.2* 3.5   CHLORIDE  --   --   --   --   --   --  103  --   --  100  --  102  --    CO2  --   --   --   --   --   --  26  --   --  27  --  23  --    BUN  --   --   --   --   --   --  3*  --   --  4*  --  6*  --    CR  --   --   --   --   --   --  0.35*  --   --  0.37*  --  0.37*  --    ANIONGAP  --   --   --   --   --   --  5  --   --  4  --  8  --    ADY  --   --   --   --   --   --  8.8  --   --  8.8  --  8.1*  --    GLC  --   --  149*  --   --  155* 159*   < >  --  119*   < > 78  --     < > = values in this interval not displayed.       No results found for this or any previous visit (from the past 24 hour(s)).  Medications     - MEDICATION INSTRUCTIONS -         alteplase  2 mg Intravenous Once     cefTRIAXone  2 g Intravenous Q12H     levETIRAcetam  1,000 mg Intravenous Q12H     metroNIDAZOLE  500 mg Intravenous Q8H     multivitamins w/minerals  15 mL Per Feeding Tube Daily     OXcarbazepine  450 mg Oral or Feeding Tube At Bedtime     pantoprazole (PROTONIX) IV  40 mg Intravenous Daily with breakfast     sodium chloride (PF)  10-40 mL Intracatheter Q7 Days     topiramate  50 mg Oral or Feeding Tube At Bedtime     vancomycin  125 mg Oral or Feeding Tube 4x Daily     vancomycin  1,000 mg Intravenous Q12H

## 2022-08-04 NOTE — PROGRESS NOTES
MD Notification    Notified Person: Neurosurgery provider called writer.      Notification Date/Time:  8/4/2022  4:20 PM    Notification Interaction:   Telephone    Purpose of Notification:  Informed writer to raise the EVD to 20cm. PA entered order.

## 2022-08-04 NOTE — PROGRESS NOTES
[facility]    Neurosurgery  Daily Post-Op Note    Assessment & Plan   Procedure(s):  LEFT FRONTAL VENTRICULOSTOMY   4 Days Post-Op  She does open eyes to voice.  She did squeeze to command on the right hand.  She does move all extremities spontaneously.  EVD was raised to 15 cm yesterday.  ICPs have been running less than 5.  EVD with minimal output.    Plan:  -Plan to clamp EVD tomorrow.  Antibiotic management per HELENA Balderas PA-C    Interval History   Stable.      Physical Exam   Temp: 98.6  F (37  C) Temp src: Axillary BP: (!) 142/69 Pulse: 113   Resp: 26 SpO2: 100 % O2 Device: Nasal cannula Oxygen Delivery: 1 LPM  Vitals:    08/02/22 0000 08/03/22 0500 08/04/22 0700   Weight: 53.5 kg (117 lb 15.1 oz) 53.4 kg (117 lb 11.6 oz) 53.1 kg (117 lb 1 oz)     Vital Signs with Ranges  Temp:  [97.1  F (36.2  C)-99.2  F (37.3  C)] 98.6  F (37  C)  Pulse:  [] 113  Resp:  [15-29] 26  BP: ()/(47-87) 142/69  SpO2:  [92 %-100 %] 100 %  I/O last 3 completed shifts:  In: 2708 [I.V.:1000; NG/GT:858]  Out: 2951.5 [Urine:2525; Other:76.5; Stool:350]    Alerts to voice and does squeeze on the right.  She moves her left side and right leg spontaneously.   Moves all extremities equally.    EVD site clean and dry      Medications     - MEDICATION INSTRUCTIONS -          alteplase  2 mg Intravenous Once     cefTRIAXone  2 g Intravenous Q12H     levETIRAcetam  1,000 mg Intravenous Q12H     metroNIDAZOLE  500 mg Intravenous Q8H     multivitamins w/minerals  15 mL Per Feeding Tube Daily     OXcarbazepine  450 mg Oral or Feeding Tube At Bedtime     pantoprazole (PROTONIX) IV  40 mg Intravenous Daily with breakfast     sodium chloride (PF)  10-40 mL Intracatheter Q7 Days     topiramate  50 mg Oral or Feeding Tube At Bedtime     vancomycin  125 mg Oral or Feeding Tube 4x Daily     vancomycin  1,000 mg Intravenous Q12H           Chucho Balderas PA-C  [facility]  [place]  [address]  [address]  [place] [address]    Tel [phone]  Pager [phone]

## 2022-08-04 NOTE — PLAN OF CARE
Goal Outcome Evaluation:    Plan of Care Reviewed With: other (see comments) (chart review, interdisciplinary rounds)     Overall Patient Progress: improving    Outcome Evaluation: Patient started trophic TF on 7/30. Slow TF advancement d/t risk of refeeding syndrome. Reached goal rate yesterday afternoon (8/3). Tolerating well thus far.

## 2022-08-04 NOTE — PLAN OF CARE
Goal Outcome Evaluation:  ICP reading have been between 5-15, Drain has drained 1-10 ml/hr throughout the day. Pt is less lethargic this afternoon, Oriented to person, Does follow nurse with eyes and does not turn her head fully to the right. Pure wick draining and it is hard to keep in place. Stool draining into rectal bag.         Overall Patient Progress: improving from this AM

## 2022-08-04 NOTE — PLAN OF CARE
Neuro: A&Ox1. Slight R weakness. LAINEZ. Follows most commands. Speech clear, slow. EVD at 15 cm H2O. ICPs 0-5 mmHg.  CV: Tachy at times. SR+PAC's, PVC's. Occasional Bigeminal PAC's.  Respiratory: LS clear, dim in bases.  GI/: Rectal Tube, purewick. Both working well. UOP good.  Skin: Intact ex ventriculostomy, scattered bruising.  Activity: BR.  Diet: NPO, TF. TF at goal. 50 ml/hr.   Drips: NS TKO  Other: Phos 1.8 this AM. Replacing per protocol recheck at 1400.  Plan: Monitor. Possible clamping of EVD Friday.     None

## 2022-08-04 NOTE — PROGRESS NOTES
CLINICAL NUTRITION SERVICES - REASSESSMENT NOTE      Malnutrition: (8/4)  % Weight Loss:  > 10% in 6 months (severe malnutrition)  % Intake:  Decreased intake does not meet criteria - EN meeting needs   Subcutaneous Fat Loss:  Orbital region moderate depletion  Muscle Loss:  Temporal region moderate depletion, Clavicle bone region moderate depletion, Acromion bone region moderate-severe depletion and Dorsal hand region severe depletion  Fluid Retention:  None noted     Malnutrition Diagnosis: Severe malnutrition  In Context of:  Acute illness or injury        EVALUATION OF PROGRESS TOWARD GOALS   Diet:  NPO     Nutrition Support- Enteral:    Type of Feeding Tube: NGT (7/30)  Enteral Frequency:  Continuous  Enteral Regimen: Osmolite 1.5 @ goal 50 mL/hr   Total Enteral Provisions: 1800 kcals (37 kcal/kg), 76 gm pro (1.6 gm/kg), 914 mL H20, 245 gm CHO, no fiber   Free Water Flush: 30 ml q 4 hours (180 mL) - for FT patency     Intake/Tolerance:    Patient started trophic TF on 7/30. Slow TF advancement d/t risk of refeeding syndrome. Reached goal rate yesterday afternoon (8/3). Tolerating well thus far.       ASSESSED NUTRITION NEEDS:  Dosing Weight 48.3 kg   Estimated Energy Needs: 4257-5643 kcals (35-40 Kcal/Kg)  Justification: repletion and underweight  Estimated Protein Needs: 70-95 grams protein (1.5-2 g pro/Kg)  Justification: repletion and hypercatabolism with critical illness  Estimated Fluid Needs: 4330-2284 mL (1 mL/Kcal)  Justification: maintenance      NEW FINDINGS:   Chart reviewed -   S/p L frontal ventriculostomy on 7/31  Room air     Labs reviewed- K+ 4 (WNL), Phos 1.8 (L) - replacing  Recent Labs   Lab 08/03/22  1227 08/01/22  0011 08/01/22  0006 07/31/22  2123 07/31/22  0039 07/30/22  2153   * 155* 159* 132* 119* 136*     Meds- certavite for micronutrients   Stool patterns- C diff+ on 8/1. 350 mL stool yesterday. Receiving vancomycin for treatment.     Weight - 53.1 kg, overall up from admission  weight likely 2/2 fluid status vs bed scale fluctuations     Date/Time Weight Weight Method   08/04/22 0700 53.1 kg (117 lb 1 oz) Bed scale   08/03/22 0500 53.4 kg (117 lb 11.6 oz) Bed scale   08/02/22 0000 53.5 kg (117 lb 15.1 oz) Bed scale   08/01/22 0000 51.5 kg (113 lb 8.6 oz) Bed scale   07/31/22 0648 55.2 kg (121 lb 11.1 oz) Bed scale   07/30/22 0134 53.2 kg (117 lb 4.6 oz) Bed scale   07/28/22 0600 49.5 kg (109 lb 2 oz) Bed scale   07/28/22 0000 -- Bed scale   07/27/22 2100 48.3 kg (106 lb 7.7 oz) Bed scale       Previous Goals (8/1):   TF will reach goal and continue to meet % nutrition needs in the next 72 hours   Evaluation: Met    Previous Nutrition Diagnosis (7/28):   Inadequate protein-energy intake related to NPO with slow TF advancement d/t refeeding risk as evidenced by TF running at 0-20 mL/hr over the past 48 hours meeting < 50% nutrition needs, limited nutritional intake x 2 weeks, significant weight loss   Evaluation: Improved       CURRENT NUTRITION DIAGNOSIS  No nutrition diagnosis at this time     INTERVENTIONS  Recommendations / Nutrition Prescription  Continue TF @ goal as ordered       Implementation  Collaboration and Referral of Nutrition care - patient discussed this morning during interdisciplinary rounds     Goals  TF will continue to meet % assessed nutrition needs       MONITORING AND EVALUATION:  Progress towards goals will be monitored and evaluated per protocol and Practice Guidelines      Aleena Stoddard RD, LD  ICU RD Pager: [phone]

## 2022-08-05 LAB
APPEARANCE CSF: ABNORMAL
COLOR CSF: ABNORMAL
CRP SERPL-MCNC: 31.2 MG/L (ref 0–8)
ERYTHROCYTE [DISTWIDTH] IN BLOOD BY AUTOMATED COUNT: 19.1 % (ref 10–15)
GLUCOSE BLDC GLUCOMTR-MCNC: 200 MG/DL (ref 70–99)
GLUCOSE BLDC GLUCOMTR-MCNC: 217 MG/DL (ref 70–99)
GLUCOSE BLDC GLUCOMTR-MCNC: 219 MG/DL (ref 70–99)
GLUCOSE BLDC GLUCOMTR-MCNC: 224 MG/DL (ref 70–99)
GLUCOSE BLDC GLUCOMTR-MCNC: 261 MG/DL (ref 70–99)
GLUCOSE CSF-MCNC: 98 MG/DL (ref 40–70)
HCT VFR BLD AUTO: 29.8 % (ref 35–47)
HGB BLD-MCNC: 8.6 G/DL (ref 11.7–15.7)
LYMPH ABN NFR CSF MANUAL: 16 %
MCH RBC QN AUTO: 20.7 PG (ref 26.5–33)
MCHC RBC AUTO-ENTMCNC: 28.9 G/DL (ref 31.5–36.5)
MCV RBC AUTO: 72 FL (ref 78–100)
MONOS+MACROS NFR CSF MANUAL: 30 %
NEUTROPHILS NFR CSF MANUAL: 54 %
PHOSPHATE SERPL-MCNC: 2.7 MG/DL (ref 2.5–4.5)
PLATELET # BLD AUTO: 352 10E3/UL (ref 150–450)
POTASSIUM BLD-SCNC: 3.6 MMOL/L (ref 3.4–5.3)
PROT CSF-MCNC: 84 MG/DL (ref 15–60)
RBC # BLD AUTO: 4.16 10E6/UL (ref 3.8–5.2)
RBC # CSF MANUAL: 109 /UL (ref 0–2)
TUBE # CSF: ABNORMAL
WBC # BLD AUTO: 12.1 10E3/UL (ref 4–11)
WBC # CSF MANUAL: 469 /UL (ref 0–5)

## 2022-08-05 PROCEDURE — 36415 COLL VENOUS BLD VENIPUNCTURE: CPT | Performed by: INTERNAL MEDICINE

## 2022-08-05 PROCEDURE — 87205 SMEAR GRAM STAIN: CPT | Performed by: PHYSICIAN ASSISTANT

## 2022-08-05 PROCEDURE — 250N000013 HC RX MED GY IP 250 OP 250 PS 637

## 2022-08-05 PROCEDURE — 85027 COMPLETE CBC AUTOMATED: CPT | Performed by: INTERNAL MEDICINE

## 2022-08-05 PROCEDURE — 84157 ASSAY OF PROTEIN OTHER: CPT | Performed by: PHYSICIAN ASSISTANT

## 2022-08-05 PROCEDURE — G0463 HOSPITAL OUTPT CLINIC VISIT: HCPCS

## 2022-08-05 PROCEDURE — 250N000013 HC RX MED GY IP 250 OP 250 PS 637: Performed by: INTERNAL MEDICINE

## 2022-08-05 PROCEDURE — 250N000012 HC RX MED GY IP 250 OP 636 PS 637: Performed by: INTERNAL MEDICINE

## 2022-08-05 PROCEDURE — 82945 GLUCOSE OTHER FLUID: CPT | Performed by: PHYSICIAN ASSISTANT

## 2022-08-05 PROCEDURE — 200N000001 HC R&B ICU

## 2022-08-05 PROCEDURE — 89051 BODY FLUID CELL COUNT: CPT | Performed by: PHYSICIAN ASSISTANT

## 2022-08-05 PROCEDURE — 84100 ASSAY OF PHOSPHORUS: CPT | Performed by: INTERNAL MEDICINE

## 2022-08-05 PROCEDURE — 250N000011 HC RX IP 250 OP 636: Performed by: INTERNAL MEDICINE

## 2022-08-05 PROCEDURE — 99233 SBSQ HOSP IP/OBS HIGH 50: CPT | Performed by: SPECIALIST

## 2022-08-05 PROCEDURE — 84132 ASSAY OF SERUM POTASSIUM: CPT | Performed by: INTERNAL MEDICINE

## 2022-08-05 PROCEDURE — 999N000190 HC STATISTIC VAT ROUNDS

## 2022-08-05 PROCEDURE — 87040 BLOOD CULTURE FOR BACTERIA: CPT | Performed by: INTERNAL MEDICINE

## 2022-08-05 PROCEDURE — 87070 CULTURE OTHR SPECIMN AEROBIC: CPT | Performed by: PHYSICIAN ASSISTANT

## 2022-08-05 PROCEDURE — 86140 C-REACTIVE PROTEIN: CPT | Performed by: PHYSICIAN ASSISTANT

## 2022-08-05 PROCEDURE — 99233 SBSQ HOSP IP/OBS HIGH 50: CPT | Performed by: INTERNAL MEDICINE

## 2022-08-05 RX ORDER — METOPROLOL TARTRATE 1 MG/ML
5 INJECTION, SOLUTION INTRAVENOUS EVERY 5 MIN PRN
Status: DISCONTINUED | OUTPATIENT
Start: 2022-08-05 | End: 2022-10-04 | Stop reason: HOSPADM

## 2022-08-05 RX ADMIN — VANCOMYCIN HYDROCHLORIDE 125 MG: KIT at 09:42

## 2022-08-05 RX ADMIN — INSULIN ASPART 2 UNITS: 100 INJECTION, SOLUTION INTRAVENOUS; SUBCUTANEOUS at 09:41

## 2022-08-05 RX ADMIN — ACETAMINOPHEN 650 MG: 325 SUSPENSION ORAL at 02:28

## 2022-08-05 RX ADMIN — CEFTRIAXONE SODIUM 2 G: 2 INJECTION, POWDER, FOR SOLUTION INTRAMUSCULAR; INTRAVENOUS at 05:17

## 2022-08-05 RX ADMIN — VANCOMYCIN HYDROCHLORIDE 125 MG: KIT at 22:04

## 2022-08-05 RX ADMIN — CEFTRIAXONE SODIUM 2 G: 2 INJECTION, POWDER, FOR SOLUTION INTRAMUSCULAR; INTRAVENOUS at 16:47

## 2022-08-05 RX ADMIN — POTASSIUM & SODIUM PHOSPHATES POWDER PACK 280-160-250 MG 1 PACKET: 280-160-250 PACK at 00:06

## 2022-08-05 RX ADMIN — VANCOMYCIN HYDROCHLORIDE 125 MG: KIT at 12:58

## 2022-08-05 RX ADMIN — METRONIDAZOLE 500 MG: 500 INJECTION, SOLUTION INTRAVENOUS at 15:41

## 2022-08-05 RX ADMIN — LEVETIRACETAM 1000 MG: 10 INJECTION INTRAVENOUS at 10:43

## 2022-08-05 RX ADMIN — OXYCODONE HYDROCHLORIDE 2.5 MG: 5 TABLET ORAL at 05:17

## 2022-08-05 RX ADMIN — VANCOMYCIN HYDROCHLORIDE 1000 MG: 1 INJECTION, SOLUTION INTRAVENOUS at 07:04

## 2022-08-05 RX ADMIN — LEVETIRACETAM 1000 MG: 10 INJECTION INTRAVENOUS at 22:05

## 2022-08-05 RX ADMIN — ACETAMINOPHEN 650 MG: 325 SUSPENSION ORAL at 12:58

## 2022-08-05 RX ADMIN — INSULIN ASPART 2 UNITS: 100 INJECTION, SOLUTION INTRAVENOUS; SUBCUTANEOUS at 13:02

## 2022-08-05 RX ADMIN — INSULIN ASPART 2 UNITS: 100 INJECTION, SOLUTION INTRAVENOUS; SUBCUTANEOUS at 20:12

## 2022-08-05 RX ADMIN — OXCARBAZEPINE 450 MG: 300 SUSPENSION ORAL at 22:04

## 2022-08-05 RX ADMIN — INSULIN ASPART 3 UNITS: 100 INJECTION, SOLUTION INTRAVENOUS; SUBCUTANEOUS at 16:37

## 2022-08-05 RX ADMIN — METRONIDAZOLE 500 MG: 500 INJECTION, SOLUTION INTRAVENOUS at 08:32

## 2022-08-05 RX ADMIN — VANCOMYCIN HYDROCHLORIDE 125 MG: KIT at 18:07

## 2022-08-05 RX ADMIN — Medication 15 ML: at 09:43

## 2022-08-05 RX ADMIN — VANCOMYCIN HYDROCHLORIDE 1000 MG: 1 INJECTION, SOLUTION INTRAVENOUS at 18:07

## 2022-08-05 RX ADMIN — ACETAMINOPHEN 650 MG: 325 SUSPENSION ORAL at 22:04

## 2022-08-05 RX ADMIN — TOPIRAMATE 50 MG: 50 TABLET ORAL at 22:04

## 2022-08-05 RX ADMIN — Medication 40 MG: at 08:42

## 2022-08-05 ASSESSMENT — ACTIVITIES OF DAILY LIVING (ADL)
ADLS_ACUITY_SCORE: 40
ADLS_ACUITY_SCORE: 40
ADLS_ACUITY_SCORE: 46
ADLS_ACUITY_SCORE: 46
ADLS_ACUITY_SCORE: 44
ADLS_ACUITY_SCORE: 40
ADLS_ACUITY_SCORE: 40
ADLS_ACUITY_SCORE: 46
ADLS_ACUITY_SCORE: 46
ADLS_ACUITY_SCORE: 40
ADLS_ACUITY_SCORE: 44
ADLS_ACUITY_SCORE: 46

## 2022-08-05 NOTE — PROGRESS NOTES
[facility]    Infectious Disease Progress Note    Date of Service : 08/05/2022      Assessment:  Patient transferred 7/27 from [facility] for concern for brain abscess (recovered COVID) - MRI suggestive of ventriculitis with left lateral ventricle abscess. On metronidaole/ceftriaxone/vancomycin, s/p left frontal ventriculostomy  cxs with no growth so far. Now has developed C.diff colitis with persistent leukocytosis.     Recommendations  1. Continue Vancomycin, Ceftriaxone, Metronidazole  2. Oral vancomycin for C.diff  3. Continue to follow WBC and clinical status  4. Suspect leukocytosis may be related to C.diff, CSF bloody sample . Follow CSF cx  5. If further rise in WBC, can consider broadening gram negative coverage to Cefepime    Nuzhat Hudson MD    Interval History   Does not respond to verbal commands, remained afebrile, EVD in place    Physical Exam   Temp: 98.1  F (36.7  C) Temp src: Axillary BP: 127/84 Pulse: (!) 129   Resp: 29 SpO2: 96 %      Vitals:    08/03/22 0500 08/04/22 0700 08/05/22 0500   Weight: 53.4 kg (117 lb 11.6 oz) 53.1 kg (117 lb 1 oz) 53.4 kg (117 lb 11.6 oz)     Vital Signs with Ranges  Temp:  [98  F (36.7  C)-100.4  F (38  C)] 98.1  F (36.7  C)  Pulse:  [] 129  Resp:  [20-30] 29  BP: (109-160)/(59-92) 127/84  SpO2:  [91 %-98 %] 96 %    Constitutional: does not respond  Lungs: Clear to auscultation bilaterally, no crackles or wheezing  Cardiovascular:tachycardic  Abdomen: soft  Skin: No rash  Neuro : L frontal ventriculostomy in place    Other:    Medications     - MEDICATION INSTRUCTIONS -         alteplase  2 mg Intravenous Once     cefTRIAXone  2 g Intravenous Q12H     insulin aspart  1-6 Units Subcutaneous Q4H     levETIRAcetam  1,000 mg Intravenous Q12H     metroNIDAZOLE  500 mg Intravenous Q8H     multivitamins w/minerals  15 mL Per Feeding Tube Daily     OXcarbazepine  450 mg Oral or Feeding Tube At Bedtime     pantoprazole  40 mg Oral or Feeding Tube  QAM AC     sodium chloride (PF)  10-40 mL Intracatheter Q7 Days     topiramate  50 mg Oral or Feeding Tube At Bedtime     vancomycin  125 mg Oral or Feeding Tube 4x Daily     vancomycin  1,000 mg Intravenous Q12H       Data   All microbiology laboratory data reviewed.  Recent Labs   Lab Test 08/05/22  0558 08/01/22  0006 07/31/22  0622   WBC 12.1* 5.3 8.2   HGB 8.6* 8.3* 7.4*   HCT 29.8* 29.0* 25.7*   MCV 72* 73* 72*    581* 523*     Recent Labs   Lab Test 08/04/22  1427 08/01/22  0006 07/31/22  0039   CR 0.38* 0.35* 0.37*     Recent Labs   Lab Test 08/08/20  0212   SED 13     Component      Latest Ref Rng & Units 8/1/2022   C Difficile Toxin B by PCR      Negative Positive (A)     Component      Latest Ref Rng & Units 8/5/2022   Tube Number       syringe, only 1 collected   Color CSF      Colorless Slightly Xanthochromic (A)   Appearance CSF      Clear Hazy (A)   Total Nucleated Cells      0 - 5 /uL 469 (H)   RBC CSF      0 - 2 /uL 109 (H)   % Neutrophils CSF      % 54   % Lymphocytes CSF      % 16   % Mono/Macros CSF      % 30   Glucose CSF      40 - 70 mg/dL 98 (H)   Protein Total CSF      15 - 60 mg/dL 84 (H)     Component      Latest Ref Rng & Units 8/5/2022   CRP Inflammation      0.0 - 8.0 mg/L 31.2 (H)       Imaging  8/3 CT head  CT SCAN OF THE HEAD WITHOUT CONTRAST August 3, 2022 2:29 PM      HISTORY: Extraventricular drain.     TECHNIQUE: Axial images of the head and coronal reformations without  IV contrast material. Radiation dose for this scan was reduced using  automated exposure control, adjustment of the mA and/or kV according  to patient size, or iterative reconstruction technique.     COMPARISON: Several prior comparisons, most recent head CT 7/31/2022.     FINDINGS: Left frontal approach ventriculostomy catheter tip  terminates in the region of the body of the left lateral ventricle.  Left lateral ventricle is now completely decompressed and small in  size. New thin hypodense bilateral  subdural fluid collections  measuring up to 0.4 cm on the left and 0.3 cm on the right.  Hypodensity around the frontal horn of the left lateral ventricle  involving the periventricular white matter may be slightly improved  since prior although this could be due to differences in technique. No  definite new intraparenchymal hemorrhage. Basal cisterns are patent.  Chronic appearing area of encephalomalacia in the right cerebellum.     The visualized portions of the sinuses and mastoids appear normal. The  bony calvarium and bones of the skull base appear intact.                                                                       IMPRESSION:     1. Left lateral ventricle has markedly decompressed since prior head  CT 7/31/2022 and now is smaller in size than the right.  2. New hypodense subdural fluid collections over the cerebral  convexity superiorly. These could represent subdural hygromas or  hematomas.  3. Hypodense edema around the frontal horn of the left lateral  ventricle may be improved since 7/31/2022 although this could be due  to differences in technique. This may be better assessed with brain  MRI if clinically necessary.

## 2022-08-05 NOTE — PROGRESS NOTES
[facility]    Neurosurgery  Daily Note    Assessment & Plan   Procedure(s):  LEFT FRONTAL VENTRICULOSTOMY   5 Days Post-Op  History of severe sepsis 2/2 ventriculitis with abscess who is now POD5 s/p left frontal EVD. EVD 20cm since yesterday, no output since. CSF clear in tubing, pulsatile and drains appropriately when lowered. +C. Diff. WBC increased from 5.3 to 12.1. CRP trending down 31.2 from 44.4 5 days ago. Opens eyes on exam and per nursing LAINEZ spontaneously, does not follow commands. Incision c/d/i.     Plan:  -Continue supportive and symptomatic treatment  -Pain control measures  -Routine wound care  -Will draw CSF labs and send today   -Plan for EVD to be clamped  -Head CT tomorrow  -If stable, will likely remove EVD. Dr. Heredia to determine   -Continue to monitor neurologic status   -Dr. Heredia in agreement with plans    Chelsy Esposito PA-C  [facility]  [facility]  [address] [place] [address]  [address] [place] [id number]    Tel [phone]  Pager [phone]    Active Problems:    Cerebral abscess    Interval History   Stable     Physical Exam   Temp: 99.9  F (37.7  C) Temp src: Axillary BP: (!) 146/72 Pulse: (!) 126   Resp: 27 SpO2: 98 %      Vitals:    08/03/22 0500 08/04/22 0700 08/05/22 0500   Weight: 117 lb 11.6 oz (53.4 kg) 117 lb 1 oz (53.1 kg) 117 lb 11.6 oz (53.4 kg)     Vital Signs with Ranges  Temp:  [98  F (36.7  C)-100.4  F (38  C)] 99.9  F (37.7  C)  Pulse:  [] 126  Resp:  [20-30] 27  BP: (119-160)/(59-92) 146/72  SpO2:  [91 %-100 %] 98 %  I/O last 3 completed shifts:  In: 2725 [I.V.:800; NG/GT:725]  Out: 2550 [Urine:2400; Stool:150]    Opens eyes to stimuli   Does not follow commands  Per nursing, has been moving extremities spontaneously but not following commands   Left frontal site secure, staples in place   EVD open at 20. CSF in tubing is clear and pulsatile, drains appropriately when lowered       Medications     -  MEDICATION INSTRUCTIONS -          alteplase  2 mg Intravenous Once     cefTRIAXone  2 g Intravenous Q12H     insulin aspart  1-6 Units Subcutaneous Q4H     levETIRAcetam  1,000 mg Intravenous Q12H     metroNIDAZOLE  500 mg Intravenous Q8H     multivitamins w/minerals  15 mL Per Feeding Tube Daily     OXcarbazepine  450 mg Oral or Feeding Tube At Bedtime     pantoprazole  40 mg Oral or Feeding Tube QAM AC     sodium chloride (PF)  10-40 mL Intracatheter Q7 Days     topiramate  50 mg Oral or Feeding Tube At Bedtime     vancomycin  125 mg Oral or Feeding Tube 4x Daily     vancomycin  1,000 mg Intravenous Q12H       Plans discussed with Dr. Heredia who was in agreement with plans    Chelsy QUINONEZ [facility]  [address] [address]    Tel [phone]  Pager [phone]

## 2022-08-05 NOTE — PLAN OF CARE
Shift Summary  [id number]    Neuro: PERRL, opens eyes to voice. inconsistently following commands. Lethargic. Tmax 100.1  CV: Tele - SR/ST with PVCs and PACs. Htn.  Resp: LS clear. RA.   GI/Diet: enteral feed via ng at goal. Rectal tube in place for loose watery stool incontinece. Enteric precations for  CDIFF.  : . Adequate urinary output. Voiding to purwick - incontinent  Skin: grossly intact ex for surgical incisions and drains. Scattered bruising.  Pain: pain controlled with tylenol and oxycodone. Morphine givenx1 for breakthrough  Mobility: assist of 2 with bed mobility. Bed rest, HOB 30  Lines/Drains: EVD @ 20cm EAM. R PICC   IVF: abx, keppra, tko  Other: contact and enteric precations. mits in place for pulling at lines/tubes      Plan of Care: full code. Continue supportive cares and neuro monitoring. Likely clamping EVD today per neurosurg note

## 2022-08-05 NOTE — PLAN OF CARE
Goal Outcome Evaluation:  Pt remains lethargic but arouses easily to voice, can follow simple commands. Pt able to whisper a few words but otherwise nonverbal. EVD clamped at 1500. Prior to clamping no EVD output.  fluid sent for culture. ICP's 6-11. Temp max 99.9. Sliding scale insulin started. Good urine output. Family updated at bedside. Cont to monitor.

## 2022-08-05 NOTE — CONSULTS
[facility]  WOC Nurse Inpatient Assessment     Consulted for: left ear         Areas Assessed:      Areas visualized during today's visit: left ear    Wound location: left ear           Last photo: 8/5  Wound due to: good catch, primary RN identiftied nonblanchable erythema, upon woc assessment erythema blanchable   Wound history/plan of care: found open to air without devices in use, primary RN reports pulse ox probe was previously in use over area   Wound base: 100 % blanchable , erythema and epidermis      Palpation of the wound bed: normal      Drainage: none     Description of drainage: none     Measurements (length x width x depth, in cm): without open area      Tunneling: N/A     Undermining: N/A  Periwound skin: Intact      Color: normal and consistent with surrounding tissue      Temperature: normal   Odor: none  Pain: no grimacing or signs of discomfort, none  Pain interventions prior to dressing change: patient tolerated well  Treatment goal: Maintain (prevention of deterioration)  STATUS: initial assessment  Supplies ordered: discussed with RN       Treatment Plan:     No treatment needed     Orders: none needed    RECOMMEND PRIMARY TEAM ORDER: None, at this time  Education provided: plan of care  Discussed plan of care with: Patient and Nurse  WOC nurse follow-up plan: signing off  Notify WOC if wound(s) deteriorate.  Nursing to notify the Provider(s) and re-consult the WOC Nurse if new skin concern.    DATA:     Current support surface: Standard  Low air loss mattress  Containment of urine/stool: Incontinence Protocol  BMI: Body mass index is 19.59 kg/m .   Active diet order: None     Output: I/O last 3 completed shifts:  In: 2580 [I.V.:900; NG/GT:680]  Out: 2300 [Urine:2250; Stool:50]     Labs: Recent Labs   Lab 08/05/22  1045 08/05/22  0558   HGB  --  8.6*   WBC  --  12.1*   CRP 31.2*  --      Pressure injury risk assessment:   Sensory Perception: 2-->very limited  Moisture:  3-->occasionally moist  Activity: 1-->bedfast  Mobility: 2-->very limited  Nutrition: 3-->adequate  Friction and Shear: 2-->potential problem  Alli Score: 13    Helena COON   Dept. Pager: [phone]  Dept. Office Number: [phone]

## 2022-08-05 NOTE — PROGRESS NOTES
[facility]    Medicine Progress Note - Hospitalist Service    Date of Admission:  7/27/2022    Assessment & Plan        Julisa Chaney is a 77 year-old female with trigeminal neuralgia who presented to [facility] on 7/19 due to severe sepsis (leukocytosis, lactic acidosis, elevated procalcitonin).  She was found to have COVID-19 with superimposed bacterial pneumonia, treated with IV vancomycin and IV meropenem.  Her hospitalization was complicated by severe metabolic encephalopathy.  Head CT on 7/25 showed possible left frontal mass causing vasogenic edema.  MRI obtained 7/27 showed possible left intracerebral abscess with ventriculitis versus neoplasm.  She was switched to IV vancomycin, cefepime and metronidazole and transferred to [facility] for neurosurgery assessment.  MRI brain w/wo contrast 7/30 showed ventriculitis with probable abscess. Neurosurgery/neurology/infectious disease/oncology consulted. Patient taken to the OR on 7/31 for left frontal ventriculostomy.      Severe sepsis secondary to ventriculitis with abscess  Subdural fluid collection   S/p left frontal ventriculostomy 7/31/22  *Presented with lactate of 4, procalcitonin of 2.11, CRP 26, leukocytosis 26.2.  *CT abdomen/pelvis showed patchy infiltrate in bibasilar area. Initially treated with broad spectrum antibiotics.  *Subsequent head imaging concerning for brain abscess with purulent ventriculitis.  *Transferred for neurosurgery evaluation, underwent above procedure  *Oncology consulted, flow cytometry without evidence for malignancy     - Routine post-operative and drain cares per neurosurgery. Plan to clamp EVD 8/5/22 per neurosurgery.   - Repeat head CT 8/3/22 with markedly decompressed left lateral ventricle, new hypodense subdural fluid collections representing subdural hygromas or hematomas. Follow-up per neurosurgery.   - CSF cultures in process, NGTD      08/05: Worsening leukocytosis.  Her last  steroid dose was on 07/31.  Neurosurgery is sending for repeat labs today.- ID following, continues on vancomycin, metronidazole, ceftriaxone IV  If any further clinical change may consider broadening antibiotic.    Sinus tachycardia: We will increase free water flushes and add as needed metoprolol.  -Monitor and treat infection as above    C difficile colitis  *Noted to have copious diarrhea 8/1/22  *C difficile PCR returned positive  - Continue vancomycin PO/FT  - ID following as above   - Rectal tube as needed     Acute metabolic encephalopathy  Infection, brain mass, acute/critical illness likely contributing  - Re-orient as needed  - Maintain normal day/night, sleep wake cycles  - Minimize sedating/altering medications as able  - Treat separate conditions as detailed above/below      COVID-19 infection  *Tested positive on 7/19.  Chest x-ray clear.  *Vaccinated  *Completed 5 days of remdesivir  *Did not receive dexamethasone as she was not hypoxic  *CRP was 28 on 7/21 and subsequently trended down to 5.7  *No respiratory symptoms  *Has completed 10 days of isolation. Off precautions.     Probable seizures  *On levetiracetam PTA-seems this was prescribed for ataxia and tremors in 2/2022 when she was hospitalized for aspiration pneumonia.  *EEG obtained at [facility] indicated possible seizures in left frontal lobe  *Levetiracetam level on 7/26 was 6 so her home dose was doubled  - Continues on levetiracetam     Hyponatremia   Nephrology was consulted at [facility].  SIADH was suspected and she was placed on fluid restrictions. Sodium subsequently normalized      Acute on chronic anemia  Chronic iron deficiency and anemia of chronic disease   *Baseline hemoglobin 9.5-10 g/dl.  Has slowly been decreasing and now down to 7.4 g/dl. No obvious bleeding noted. Decline in hemoglobin could be secondary to hemodilution.   *Folate normal, B12 elevated, ferritin normal at 35. TIBC low and iron saturation low at 6%  -  "Monitor hemoglobin      Trigeminal neuralgia  - Continue PTA oxcarbazepine, topiramate      Peptic ulcer disease  EGD 2020 showed gastroduodenitis with duodenal stricture  - Change PPI to PO/FT     Hypoalbuminemia  Albumin 2.6.     Hypokalemia   Hypophosphatemia   - Monitor and replace per protocol      Severe malnutrition  Based on nutrition assessment  - Nutrition consulted   - Continue tube feeds      Hyperglycemia (HbA1c of 5.6 on 7/19/2022)    Recent Labs   Lab 08/05/22  1301 08/05/22  0817 08/05/22  0557 08/04/22  1901 08/04/22  1427 08/03/22  1227   * 217* 200* 221* 225* 149*     - started sliding scale    Chronic lacunar CVA  MRI showed age related changes along with old lacunar infarcts in right thalamus, right side of danyel and focal areas of encephalomalacia in cerebellar hemispheres bilaterally     Diet: Adult Formula Drip Feeding: Continuous Osmolite 1.5; Other - Specify in Comment; Goal Rate: 50; mL/hr; Medication - Feeding Tube Flush Frequency: At least 15-30 mL water before and after medication administration and with tube clogging; Amount to ...    DVT Prophylaxis: Pneumatic Compression Devices  Abrams Catheter: Not present  Central Lines: PRESENT  PICC Triple Lumen 07/22/22 Right Basilic Vascular access-Site Assessment: WDL  Cardiac Monitoring: ACTIVE order. Indication: ICU  Code Status: Full Code      Disposition Plan     Expected Discharge Date: 08/08/2022        Discharge Comments: Needs NG feeding tube placed        The patient's care was discussed with the Bedside Nurse and Patient.    Timmy Grover MD, MD  Hospitalist Service  [facility]  Securely message with the Vocera Web Console (learn more here)  Text page via [employer] Paging/Directory     \"This dictation was performed with voice recognition software and may contain errors,  omissions and inadvertent word substitution.\"       Clinically Significant Risk Factors Present on Admission                  "     ______________________________________________________________________    Interval History   Patient remains nonvocal.  Discussed with bedside RN and no specific new concerns  4 point ROS done and negative unless mentioned     Data reviewed today: I reviewed all medications, new labs and imaging results over the last 24 hours. I personally reviewed no images or EKG's today.    Physical Exam   /89   Pulse (!) 133   Temp 99.9  F (37.7  C) (Axillary)   Resp 25   Wt 53.4 kg (117 lb 11.6 oz)   SpO2 98%   BMI 19.59 kg/m    HEENT-surgical scar and drain in place.  Further as per neurosurgery  Neck- supple  CVS- I+II+ no m/r/g  RS- CTAB  Abdo- soft, no tenderness . No g/r/r   Ext- no edema   CNS-nods yes/no.  Does not follow instructions.      Data    BMPRecent Labs   Lab 08/05/22  1301 08/05/22  0817 08/05/22  0558 08/05/22  0557 08/04/22  1901 08/04/22  1427 08/04/22  0544 08/03/22  1228 08/01/22  0011 08/01/22  0006 07/31/22  2123 07/31/22  0039 07/30/22  1212 07/30/22  0559   NA  --   --   --   --   --  134  --   --   --  134  --  131*  --  133   POTASSIUM  --   --  3.6  --   --  3.9 4.0 3.8   < > 3.8  --  4.2  4.2   < > 3.2*   CHLORIDE  --   --   --   --   --  100  --   --   --  103  --  100  --  102   ADY  --   --   --   --   --  8.4*  --   --   --  8.8  --  8.8  --  8.1*   CO2  --   --   --   --   --  25  --   --   --  26  --  27  --  23   BUN  --   --   --   --   --  7  --   --   --  3*  --  4*  --  6*   CR  --   --   --   --   --  0.38*  --   --   --  0.35*  --  0.37*  --  0.37*   * 217*  --  200* 221* 225*  --   --    < > 159*   < > 119*   < > 78    < > = values in this interval not displayed.     CBC  Recent Labs   Lab 08/05/22  0558 08/01/22  0006 07/31/22  0622 07/30/22  0559   WBC 12.1* 5.3 8.2 8.2   RBC 4.16 3.97 3.57* 4.15   HGB 8.6* 8.3* 7.4* 8.5*   HCT 29.8* 29.0* 25.7* 29.4*   MCV 72* 73* 72* 71*   MCH 20.7* 20.9* 20.7* 20.5*   MCHC 28.9* 28.6* 28.8* 28.9*   RDW 19.1* 19.0* 18.8*  18.6*    581* 523* 602*       No results found for this or any previous visit (from the past 24 hour(s)).

## 2022-08-06 ENCOUNTER — APPOINTMENT (OUTPATIENT)
Dept: CT IMAGING | Facility: CLINIC | Age: 77
DRG: 023 | End: 2022-08-06
Attending: PHYSICIAN ASSISTANT
Payer: COMMERCIAL

## 2022-08-06 LAB
ACANTHOCYTES BLD QL SMEAR: SLIGHT
ANION GAP SERPL CALCULATED.3IONS-SCNC: 6 MMOL/L (ref 3–14)
APPEARANCE CSF: ABNORMAL
BACTERIA CSF CULT: NO GROWTH
BASOPHILS # BLD MANUAL: 0 10E3/UL (ref 0–0.2)
BASOPHILS NFR BLD MANUAL: 0 %
BUN SERPL-MCNC: 14 MG/DL (ref 7–30)
CALCIUM SERPL-MCNC: 9.3 MG/DL (ref 8.5–10.1)
CHLORIDE BLD-SCNC: 101 MMOL/L (ref 94–109)
CO2 SERPL-SCNC: 27 MMOL/L (ref 20–32)
COLOR CSF: ABNORMAL
CREAT SERPL-MCNC: 0.42 MG/DL (ref 0.52–1.04)
CRP SERPL-MCNC: 187 MG/L (ref 0–8)
EOSINOPHIL # BLD MANUAL: 0 10E3/UL (ref 0–0.7)
EOSINOPHIL NFR BLD MANUAL: 0 %
ERYTHROCYTE [DISTWIDTH] IN BLOOD BY AUTOMATED COUNT: 18.7 % (ref 10–15)
GFR SERPL CREATININE-BSD FRML MDRD: >90 ML/MIN/1.73M2
GLUCOSE BLD-MCNC: 167 MG/DL (ref 70–99)
GLUCOSE BLDC GLUCOMTR-MCNC: 176 MG/DL (ref 70–99)
GLUCOSE BLDC GLUCOMTR-MCNC: 206 MG/DL (ref 70–99)
GLUCOSE BLDC GLUCOMTR-MCNC: 212 MG/DL (ref 70–99)
GLUCOSE BLDC GLUCOMTR-MCNC: 236 MG/DL (ref 70–99)
GLUCOSE BLDC GLUCOMTR-MCNC: 268 MG/DL (ref 70–99)
GLUCOSE BLDC GLUCOMTR-MCNC: 316 MG/DL (ref 70–99)
GLUCOSE CSF-MCNC: 107 MG/DL (ref 40–70)
GRAM STAIN RESULT: NORMAL
GRAM STAIN RESULT: NORMAL
HCT VFR BLD AUTO: 31.5 % (ref 35–47)
HGB BLD-MCNC: 9.2 G/DL (ref 11.7–15.7)
LYMPH ABN NFR CSF MANUAL: 22 %
LYMPHOCYTES # BLD MANUAL: 1.3 10E3/UL (ref 0.8–5.3)
LYMPHOCYTES NFR BLD MANUAL: 5 %
MCH RBC QN AUTO: 20.7 PG (ref 26.5–33)
MCHC RBC AUTO-ENTMCNC: 29.2 G/DL (ref 31.5–36.5)
MCV RBC AUTO: 71 FL (ref 78–100)
MONOCYTES # BLD MANUAL: 2 10E3/UL (ref 0–1.3)
MONOCYTES NFR BLD MANUAL: 8 %
MONOS+MACROS NFR CSF MANUAL: 31 %
NEUTROPHILS # BLD MANUAL: 21.8 10E3/UL (ref 1.6–8.3)
NEUTROPHILS NFR BLD MANUAL: 87 %
NEUTROPHILS NFR CSF MANUAL: 47 %
PHOSPHATE SERPL-MCNC: 2.4 MG/DL (ref 2.5–4.5)
PLAT MORPH BLD: ABNORMAL
PLATELET # BLD AUTO: ABNORMAL 10*3/UL
POTASSIUM BLD-SCNC: 3.8 MMOL/L (ref 3.4–5.3)
PROT CSF-MCNC: 67 MG/DL (ref 15–60)
RBC # BLD AUTO: 4.44 10E6/UL (ref 3.8–5.2)
RBC # CSF MANUAL: 24 /UL (ref 0–2)
RBC MORPH BLD: ABNORMAL
SODIUM SERPL-SCNC: 134 MMOL/L (ref 133–144)
TARGETS BLD QL SMEAR: SLIGHT
TUBE # CSF: 1
WBC # BLD AUTO: 25 10E3/UL (ref 4–11)
WBC # CSF MANUAL: 233 /UL (ref 0–5)

## 2022-08-06 PROCEDURE — 84100 ASSAY OF PHOSPHORUS: CPT | Performed by: INTERNAL MEDICINE

## 2022-08-06 PROCEDURE — 85014 HEMATOCRIT: CPT | Performed by: INTERNAL MEDICINE

## 2022-08-06 PROCEDURE — 85007 BL SMEAR W/DIFF WBC COUNT: CPT | Performed by: INTERNAL MEDICINE

## 2022-08-06 PROCEDURE — 99232 SBSQ HOSP IP/OBS MODERATE 35: CPT | Performed by: HOSPITALIST

## 2022-08-06 PROCEDURE — 250N000011 HC RX IP 250 OP 636: Performed by: INTERNAL MEDICINE

## 2022-08-06 PROCEDURE — 36415 COLL VENOUS BLD VENIPUNCTURE: CPT | Performed by: INTERNAL MEDICINE

## 2022-08-06 PROCEDURE — 85048 AUTOMATED LEUKOCYTE COUNT: CPT | Performed by: INTERNAL MEDICINE

## 2022-08-06 PROCEDURE — 250N000013 HC RX MED GY IP 250 OP 250 PS 637

## 2022-08-06 PROCEDURE — 250N000013 HC RX MED GY IP 250 OP 250 PS 637: Performed by: INTERNAL MEDICINE

## 2022-08-06 PROCEDURE — 70450 CT HEAD/BRAIN W/O DYE: CPT

## 2022-08-06 PROCEDURE — 200N000001 HC R&B ICU

## 2022-08-06 PROCEDURE — 84157 ASSAY OF PROTEIN OTHER: CPT | Performed by: NURSE PRACTITIONER

## 2022-08-06 PROCEDURE — 80048 BASIC METABOLIC PNL TOTAL CA: CPT | Performed by: INTERNAL MEDICINE

## 2022-08-06 PROCEDURE — 87205 SMEAR GRAM STAIN: CPT | Performed by: NURSE PRACTITIONER

## 2022-08-06 PROCEDURE — 999N000190 HC STATISTIC VAT ROUNDS

## 2022-08-06 PROCEDURE — 250N000011 HC RX IP 250 OP 636: Performed by: SPECIALIST

## 2022-08-06 PROCEDURE — 89050 BODY FLUID CELL COUNT: CPT | Performed by: NURSE PRACTITIONER

## 2022-08-06 PROCEDURE — 86140 C-REACTIVE PROTEIN: CPT | Performed by: INTERNAL MEDICINE

## 2022-08-06 PROCEDURE — 87070 CULTURE OTHR SPECIMN AEROBIC: CPT | Performed by: NURSE PRACTITIONER

## 2022-08-06 PROCEDURE — 250N000009 HC RX 250

## 2022-08-06 PROCEDURE — 99233 SBSQ HOSP IP/OBS HIGH 50: CPT | Performed by: SPECIALIST

## 2022-08-06 PROCEDURE — 250N000011 HC RX IP 250 OP 636: Performed by: HOSPITALIST

## 2022-08-06 PROCEDURE — 82945 GLUCOSE OTHER FLUID: CPT | Performed by: NURSE PRACTITIONER

## 2022-08-06 RX ORDER — WATER 10 ML/10ML
INJECTION INTRAMUSCULAR; INTRAVENOUS; SUBCUTANEOUS
Status: COMPLETED
Start: 2022-08-06 | End: 2022-08-06

## 2022-08-06 RX ADMIN — Medication 15 ML: at 08:18

## 2022-08-06 RX ADMIN — ALTEPLASE 2 MG: 2.2 INJECTION, POWDER, LYOPHILIZED, FOR SOLUTION INTRAVENOUS at 08:18

## 2022-08-06 RX ADMIN — ALTEPLASE 2 MG: 2.2 INJECTION, POWDER, LYOPHILIZED, FOR SOLUTION INTRAVENOUS at 14:46

## 2022-08-06 RX ADMIN — OXYCODONE HYDROCHLORIDE 2.5 MG: 5 TABLET ORAL at 15:59

## 2022-08-06 RX ADMIN — WATER 10 ML: 1 INJECTION INTRAMUSCULAR; INTRAVENOUS; SUBCUTANEOUS at 08:19

## 2022-08-06 RX ADMIN — METRONIDAZOLE 500 MG: 500 INJECTION, SOLUTION INTRAVENOUS at 09:03

## 2022-08-06 RX ADMIN — ACETAMINOPHEN 650 MG: 325 SUSPENSION ORAL at 14:26

## 2022-08-06 RX ADMIN — POTASSIUM & SODIUM PHOSPHATES POWDER PACK 280-160-250 MG 1 PACKET: 280-160-250 PACK at 15:59

## 2022-08-06 RX ADMIN — VANCOMYCIN HYDROCHLORIDE 1000 MG: 1 INJECTION, SOLUTION INTRAVENOUS at 19:57

## 2022-08-06 RX ADMIN — Medication 40 MG: at 07:45

## 2022-08-06 RX ADMIN — METRONIDAZOLE 500 MG: 500 INJECTION, SOLUTION INTRAVENOUS at 00:20

## 2022-08-06 RX ADMIN — VANCOMYCIN HYDROCHLORIDE 1000 MG: 1 INJECTION, SOLUTION INTRAVENOUS at 07:40

## 2022-08-06 RX ADMIN — INSULIN ASPART 3 UNITS: 100 INJECTION, SOLUTION INTRAVENOUS; SUBCUTANEOUS at 16:08

## 2022-08-06 RX ADMIN — INSULIN ASPART 2 UNITS: 100 INJECTION, SOLUTION INTRAVENOUS; SUBCUTANEOUS at 04:14

## 2022-08-06 RX ADMIN — CEFEPIME HYDROCHLORIDE 2 G: 2 INJECTION, POWDER, FOR SOLUTION INTRAVENOUS at 21:33

## 2022-08-06 RX ADMIN — LEVETIRACETAM 1000 MG: 10 INJECTION INTRAVENOUS at 21:33

## 2022-08-06 RX ADMIN — VANCOMYCIN HYDROCHLORIDE 125 MG: KIT at 08:29

## 2022-08-06 RX ADMIN — OXCARBAZEPINE 450 MG: 300 SUSPENSION ORAL at 21:33

## 2022-08-06 RX ADMIN — CEFTRIAXONE SODIUM 2 G: 2 INJECTION, POWDER, FOR SOLUTION INTRAMUSCULAR; INTRAVENOUS at 04:07

## 2022-08-06 RX ADMIN — VANCOMYCIN HYDROCHLORIDE 125 MG: KIT at 12:23

## 2022-08-06 RX ADMIN — CEFEPIME HYDROCHLORIDE 2 G: 2 INJECTION, POWDER, FOR SOLUTION INTRAVENOUS at 14:14

## 2022-08-06 RX ADMIN — INSULIN ASPART 2 UNITS: 100 INJECTION, SOLUTION INTRAVENOUS; SUBCUTANEOUS at 12:21

## 2022-08-06 RX ADMIN — METRONIDAZOLE 500 MG: 500 INJECTION, SOLUTION INTRAVENOUS at 15:59

## 2022-08-06 RX ADMIN — TOPIRAMATE 50 MG: 50 TABLET ORAL at 21:33

## 2022-08-06 RX ADMIN — INSULIN ASPART 1 UNITS: 100 INJECTION, SOLUTION INTRAVENOUS; SUBCUTANEOUS at 07:52

## 2022-08-06 RX ADMIN — VANCOMYCIN HYDROCHLORIDE 125 MG: KIT at 17:50

## 2022-08-06 RX ADMIN — INSULIN ASPART 4 UNITS: 100 INJECTION, SOLUTION INTRAVENOUS; SUBCUTANEOUS at 00:14

## 2022-08-06 RX ADMIN — VANCOMYCIN HYDROCHLORIDE 125 MG: KIT at 21:33

## 2022-08-06 RX ADMIN — POTASSIUM & SODIUM PHOSPHATES POWDER PACK 280-160-250 MG 1 PACKET: 280-160-250 PACK at 12:23

## 2022-08-06 RX ADMIN — WATER 10 ML: 1 INJECTION INTRAMUSCULAR; INTRAVENOUS; SUBCUTANEOUS at 14:52

## 2022-08-06 RX ADMIN — LEVETIRACETAM 1000 MG: 10 INJECTION INTRAVENOUS at 10:31

## 2022-08-06 RX ADMIN — INSULIN ASPART 2 UNITS: 100 INJECTION, SOLUTION INTRAVENOUS; SUBCUTANEOUS at 20:02

## 2022-08-06 RX ADMIN — ACETAMINOPHEN 650 MG: 325 SUSPENSION ORAL at 21:32

## 2022-08-06 RX ADMIN — POTASSIUM & SODIUM PHOSPHATES POWDER PACK 280-160-250 MG 1 PACKET: 280-160-250 PACK at 08:29

## 2022-08-06 ASSESSMENT — ACTIVITIES OF DAILY LIVING (ADL)
ADLS_ACUITY_SCORE: 40
ADLS_ACUITY_SCORE: 40
ADLS_ACUITY_SCORE: 44
ADLS_ACUITY_SCORE: 40
ADLS_ACUITY_SCORE: 44
ADLS_ACUITY_SCORE: 40
ADLS_ACUITY_SCORE: 44
ADLS_ACUITY_SCORE: 40
ADLS_ACUITY_SCORE: 44
ADLS_ACUITY_SCORE: 40

## 2022-08-06 NOTE — PROGRESS NOTES
[facility]    Infectious Disease Progress Note    Date of Service : 08/06/2022     Assessment:  Patient transferred 7/27 from [facility] for concern for brain abscess (recovered COVID) - MRI suggestive of ventriculitis with left lateral ventricle abscess. Has been on metronidaole/ceftriaxone/vancomycin, s/p left frontal ventriculostomy  cxs with no growth so far. Now has developed C.diff colitis and has worsening leukocytosis. CSF studies improving and CSF cx negative, but CT head shows left sided hydronephrosis.     Recommendations  1. Blood cxs x 2 sets  2. Follow CSF cxs, add fungal cxs to CSF as well  3. Ceftriaxone to Cefepime empirically  4. Neurosurgery to address hydeocephalus  5. Continue IV Vancomycin and Metronidazole as well as oral vancomycin for C.diff    Goals of care discussion    Nuzhat Hudson MD    Interval History   Obtunded, does not respond to verbal stimuli, does not open eyes. Left sided EVD in place, clear CSF     Physical Exam   Temp: 97.9  F (36.6  C) Temp src: Axillary BP: 112/55 Pulse: 115   Resp: 30 SpO2: 97 % O2 Device: None (Room air)    Vitals:    08/04/22 0700 08/05/22 0500 08/06/22 0600   Weight: 53.1 kg (117 lb 1 oz) 53.4 kg (117 lb 11.6 oz) 53 kg (116 lb 13.5 oz)     Vital Signs with Ranges  Temp:  [97.9  F (36.6  C)-100  F (37.8  C)] 97.9  F (36.6  C)  Pulse:  [104-134] 115  Resp:  [14-34] 30  BP: ()/(47-89) 112/55  SpO2:  [96 %-100 %] 97 %    Constitutional: does not respond  Lungs: Clear to auscultation bilaterally, no crackles or wheezing  Cardiovascular:tachycardic  Abdomen: soft  Skin: No rash  Neuro : L frontal ventriculostomy in place    Other:    Medications     - MEDICATION INSTRUCTIONS -         cefTRIAXone  2 g Intravenous Q12H     insulin aspart  1-6 Units Subcutaneous Q4H     levETIRAcetam  1,000 mg Intravenous Q12H     metroNIDAZOLE  500 mg Intravenous Q8H     multivitamins w/minerals  15 mL Per Feeding Tube Daily     OXcarbazepine  450  mg Oral or Feeding Tube At Bedtime     pantoprazole  40 mg Oral or Feeding Tube QAM AC     potassium & sodium phosphates  1 packet Oral or Feeding Tube Q4H     sodium chloride (PF)  10-40 mL Intracatheter Q7 Days     topiramate  50 mg Oral or Feeding Tube At Bedtime     vancomycin  125 mg Oral or Feeding Tube 4x Daily     vancomycin  1,000 mg Intravenous Q12H       Data   All microbiology laboratory data reviewed.  Recent Labs   Lab Test 08/06/22  0705 08/05/22  0558 08/01/22  0006 07/31/22  0622   WBC 25.0* 12.1* 5.3 8.2   HGB 9.2* 8.6* 8.3* 7.4*   HCT 31.5* 29.8* 29.0* 25.7*   MCV 71* 72* 73* 72*   PLT  --  352 581* 523*     Recent Labs   Lab Test 08/06/22  0705 08/04/22  1427 08/01/22  0006   CR 0.42* 0.38* 0.35*     Recent Labs   Lab Test 08/08/20  0212   SED 13     Component      Latest Ref Rng & Units 8/6/2022   Tube Number       1   Color CSF      Colorless Slightly Xanthochromic (A)   Appearance CSF      Clear Hazy (A)   Total Nucleated Cells      0 - 5 /uL 233 (H)   RBC CSF      0 - 2 /uL 24 (H)   % Neutrophils CSF      % 47   % Lymphocytes CSF      % 22   % Mono/Macros CSF      % 31   Glucose CSF      40 - 70 mg/dL 107 (H)   Protein Total CSF      15 - 60 mg/dL 67 (H)   GLUCOSE BY METER POCT      70 - 99 mg/dL 212 (H)       Imaging  8/6 CT head  EXAM: CT HEAD W/O CONTRAST  LOCATION: [facility]  DATE/TIME: 8/6/2022 5:00 AM     INDICATION: assess ventricular system  COMPARISON: 8/3/2022  TECHNIQUE: Routine CT Head without IV contrast. Multiplanar reformats. Dose reduction techniques were used.     FINDINGS:  INTRACRANIAL CONTENTS: Left frontal approach ventriculostomy catheter crossing the body of the left lateral ventricle. Left lateral ventricle is increased in size compared to prior consistent with interval mild hydrocephalus. Stable third ventricle,   right lateral ventricle and fourth ventricle. Edema in the left orbital frontal region is similar. Mild small vessel ischemic  disease. There is mild to moderate atrophy.      VISUALIZED ORBITS/SINUSES/MASTOIDS: No intraorbital abnormality. No paranasal sinus mucosal disease. No middle ear or mastoid effusion.     BONES/SOFT TISSUES: No acute abnormality.                                                                      IMPRESSION:  1.  Interval development of hydrocephalus involving the left lateral ventricle and temporal horn when compared to prior. The ventricular is otherwise stable in size.  2.  Stable positioning of the left frontal approach ventriculostomy catheter.

## 2022-08-06 NOTE — PROGRESS NOTES
[facility]    Medicine Progress Note - Hospitalist Service    Date of Admission:  7/27/2022    Assessment & Plan          This is a 77-year-old woman with medical history which includes trigeminal neuralgia who was recently admitted to [facility] on 7/19 for severe sepsis(had leukocytosis, lactic acidosis and elevated procalcitonin)   due to suspected COVID-19 infection and suspected bacterial pneumonia. recent COVID-19 infection which was diagnosed on 7/19/2022 and was treated with 5 days of remdesivir and did not require any steroids and she was treated with IV vancomycin and IV meropenem.    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  She was switched to IV vancomycin, cefepime and metronidazole and transferred to [facility] for neurosurgery assessment. MRI brain w/wo contrast 7/30 showed ventriculitis with probable abscess. Neurosurgery/neurology/infectious disease/oncology consulted. Patient taken to the OR on 7/31 for left frontal ventriculostomy.         1) severe sepsis secondary due to ventriculitis with abscess  Subdural fluid collection  Status left frontal ventriculostomy 7/31/22  -He had imaging done as above which showed ventriculitis with probable abscess  -Neurosurgery and oncology were consulted and flow cytometry was done which did not show any evidence of malignancy  -Patient underwent left frontal ventriculostomy on 7/31/2022 and has been followed by the neurosurgery team when she had repeat CT scan of the head done on 8/3 which showed markedly decompressed left lateral ventricle with new hypodense subdural fluid collection representing subdural hygromas or hematomas  -ID has been consulted during this  hospital stay and her cultures are being monitored and her blood cultures are negative  - CSF sent yesterday 8/4  which revealed , , glucose 98 and protein 84. WBC this AM 25, . CSF 2cc for repeat labs today. CSF with sediment.   -EVD was clamped yesterday and repeat labs were reviewed by neurosurgery team and continue with clamped evd and continue with antibiotics per ID and repeat CT scan of the head in the morning  -Her white count did increase today and repeat blood cultures were ordered and fungal cultures were added to CSF also and they have switched ceftriaxone to cefepime and continue with other antibiotics including vancomycin, Flagyl  -Appreciate input from ID and neurosurgery team      2) acute infectious encephalopathy due to above   - Management with antibiotics     3)  sinus tachycardia  -We will continue to monitor and she is on as needed metoprolol and will continue to treat her infection    4) C. difficile colitis  -Patient was noted to have copious diarrhea on 8/1 and sample came back positive for C. difficile and she was started on oral vancomycin and Bactrim tube is in place and ID is following the patient    5) history of trigeminal neuralgia  -We will continue the patient with Trileptal and Topamax    6) history of chronic pyloric ulcer  -She had EGD done in 2020 which showed gastroduodenitis with a duodenal stricture and continue the patient with IV PPI    7) hyperglycemia  -Her recent HbA1c was 5.6 and she is on multiple medications and will continue with sliding scale    8) hypokalemia and hyponatremia-resolved  Hypophosphatemia  -She does have normal sodium at 134, potassium of 3.8 and has phosphorus of 2.4 and we will continue to monitor and continue with replacement protocol    9) severe malnutrition  -Patient had nutrition assessment done and continue with tube feeds    10) acute on chronic anemia  -I did review the prior labs and her hemoglobin is between 9-10 at  baseline  -Hemoglobin on admission was 11.7 and lowest has been 7.4 and is 9.2 today  -She had iron panel done which showed that her total iron was low at 14, binding capacity was low at 28, iron saturation was 6 and B12 was high and folate was normal  -Her hemoglobin today stable at 9.2 and we will continue to monitor           Diet: Adult Formula Drip Feeding: Continuous Osmolite 1.5; Other - Specify in Comment; Goal Rate: 50; mL/hr; Medication - Feeding Tube Flush Frequency: At least 15-30 mL water before and after medication administration and with tube clogging; Amount to ...    DVT Prophylaxis: Pneumatic Compression Devices  Abrams Catheter: Not present  Central Lines: PRESENT  PICC Triple Lumen 07/22/22 Right Basilic Vascular access-Site Assessment: WDL  Cardiac Monitoring: ACTIVE order. Indication: ICU  Code Status: Full Code      Disposition Plan     Expected Discharge Date: 08/08/2022        Discharge Comments: Needs NG feeding tube placed        The patient's care was discussed with the Bedside Nurse, Patient and Patient's Family. Spoke with daughter venu at 509 pm she was updated and continues to be full code     Stephon Madsen MD  Hospitalist Service  [facility]  Securely message with the Vocera Web Console (learn more here)  Text page via [employer] Paging/Directory         Clinically Significant Risk Factors Present on Admission                    She is critically ill and her prognosis is guarded  ______________________________________________________________________    Interval History     I saw the patient today and spoke with patient's nurse and she is minimally responsive and open her eyes.  I was not able to do full review of system    I did look at right knee at 445 pm and it not red or swollen but was warm and we will monitor     Data reviewed today: I reviewed all medications, new labs and imaging results over the last 24 hours. I personally reviewed the head CT image(s)  showing 1.  Interval development of hydrocephalus involving the left lateral ventricle and temporal horn when compared to prior. The ventricular is otherwise stable in size..    Physical Exam   Vital Signs: Temp: 98.2  F (36.8  C) Temp src: Oral BP: 110/50 Pulse: (!) 124   Resp: (!) 31 SpO2: 98 % O2 Device: None (Room air)    Weight: 116 lbs 13.5 oz        General: She is laying in the bed and is minimally responsive and opens her eyes to voice  HEENT: JANN, surgical scar and drain is in place  Neck: Neck is supple   Respiratory: Clear to auscultation with no wheeze  Cardiovascular: Regular rate , S1 and S2 normal with no murmer or rubs or gallops  Abdomen:   soft , non tender , non distended and bowel sound present   Skin: No skin rashes or lesions to inspection or palpation.  Neurologic: Limited because of her clinical condition  Musculoskeletal: Cannot assess because of her clinical condition  Psychiatric: Cannot assess because of her clinical condition    Data   Recent Labs   Lab 08/06/22  1217 08/06/22  0750 08/06/22  0705 08/05/22  0817 08/05/22  0558 08/04/22  1901 08/04/22  1427 08/01/22  0011 08/01/22  0006 07/31/22  2123 07/31/22  0622   WBC  --   --  25.0*  --  12.1*  --   --   --  5.3  --  8.2   HGB  --   --  9.2*  --  8.6*  --   --   --  8.3*  --  7.4*   MCV  --   --  71*  --  72*  --   --   --  73*  --  72*   PLT  --   --   --   --  352  --   --   --  581*  --  523*   NA  --   --  134  --   --   --  134  --  134  --   --    POTASSIUM  --   --  3.8  --  3.6  --  3.9   < > 3.8  --   --    CHLORIDE  --   --  101  --   --   --  100  --  103  --   --    CO2  --   --  27  --   --   --  25  --  26  --   --    BUN  --   --  14  --   --   --  7  --  3*  --   --    CR  --   --  0.42*  --   --   --  0.38*  --  0.35*  --   --    ANIONGAP  --   --  6  --   --   --  9  --  5  --   --    ADY  --   --  9.3  --   --   --  8.4*  --  8.8  --   --    * 176* 167*   < >  --    < > 225*   < > 159*   < >  --     < > =  values in this interval not displayed.     Recent Results (from the past 24 hour(s))   CT Head w/o Contrast    Narrative    EXAM: CT HEAD W/O CONTRAST  LOCATION: [facility]  DATE/TIME: 8/6/2022 5:00 AM    INDICATION: assess ventricular system  COMPARISON: 8/3/2022  TECHNIQUE: Routine CT Head without IV contrast. Multiplanar reformats. Dose reduction techniques were used.    FINDINGS:  INTRACRANIAL CONTENTS: Left frontal approach ventriculostomy catheter crossing the body of the left lateral ventricle. Left lateral ventricle is increased in size compared to prior consistent with interval mild hydrocephalus. Stable third ventricle,   right lateral ventricle and fourth ventricle. Edema in the left orbital frontal region is similar. Mild small vessel ischemic disease. There is mild to moderate atrophy.     VISUALIZED ORBITS/SINUSES/MASTOIDS: No intraorbital abnormality. No paranasal sinus mucosal disease. No middle ear or mastoid effusion.    BONES/SOFT TISSUES: No acute abnormality.      Impression    IMPRESSION:  1.  Interval development of hydrocephalus involving the left lateral ventricle and temporal horn when compared to prior. The ventricular is otherwise stable in size.  2.  Stable positioning of the left frontal approach ventriculostomy catheter.    Exam discussed with Dr. Layton at 6:17 AM     Medications     - MEDICATION INSTRUCTIONS -         ceFEPIme (MAXIPIME) IV  2 g Intravenous Q8H     insulin aspart  1-6 Units Subcutaneous Q4H     levETIRAcetam  1,000 mg Intravenous Q12H     metroNIDAZOLE  500 mg Intravenous Q8H     multivitamins w/minerals  15 mL Per Feeding Tube Daily     OXcarbazepine  450 mg Oral or Feeding Tube At Bedtime     pantoprazole  40 mg Oral or Feeding Tube QAM AC     potassium & sodium phosphates  1 packet Oral or Feeding Tube Q4H     sodium chloride (PF)  10-40 mL Intracatheter Q7 Days     topiramate  50 mg Oral or Feeding Tube At Bedtime     vancomycin  125  mg Oral or Feeding Tube 4x Daily     vancomycin  1,000 mg Intravenous Q12H

## 2022-08-06 NOTE — PLAN OF CARE
Goal Outcome Evaluation:  Pt more verbal this afternoon. R arm strength/ROM improved.  Pt given tylenol and oxycodone for back and head pain prn. EVD remains clamped. ICP's 2-6. Plan for CT in am. CSF fluid sent for repeat cultures today. Cont to monitor.

## 2022-08-06 NOTE — PLAN OF CARE
Goal Outcome Evaluation:      Pt remains lethargic but arouses easily to voice, can follow simple commands. Pt unable to speak any words for this nurse. EVD clamped during shift per orders. ICPS 6-12. Temp max 100.0. Blood sugars low 200s to high 300s. Continue to monitor.

## 2022-08-06 NOTE — PROGRESS NOTES
[facility]    Neurosurgery Progress Note    Date of Service (when I saw the patient): 08/06/2022     Assessment & Plan     Procedure(s):  LEFT FRONTAL VENTRICULOSTOMY   -6 Days Post-Op  History of severe sepsis 2/2 ventriculitis with abscess. EVD clamped yesterday. Head CT with development of left lateral ventricle hydrocephalus. On exam today she opens eyes and moves extremities spontaneously. Was able to open eyes to command this AM. Incision CDI. CSF sent yesterday which revealed , , glucose 98 and protein 84. WBC this AM 25, . CSF 2cc for repeat labs today. CSF with sediment.     Plan:  -Continue clamped EVD for now, continue to monitor neurologic status and ICP   -Repeat CSF labs sent  -Pain control as needed  -Routine wound care    ADDENDUM: Repeat CSF cultures reviewed. Continue EVD clamped. Abx per ID. Repeat head CT in AM.     I have discussed the following assessment and plan Dr. Heredia and Dr. Santiago who is in agreement with initial plan and will follow up with further consultation recommendations.    Hafsa Layton CNP  [facility] Neurosurgery  [facility]  [address]  [address] [place] [address]    Tel [phone]  Pager [phone]      Interval History   Stable.    Physical Exam   Temp: 97.9  F (36.6  C) Temp src: Axillary BP: 112/55 Pulse: 115   Resp: 30 SpO2: 97 % O2 Device: None (Room air)    Vitals:    08/04/22 0700 08/05/22 0500 08/06/22 0600   Weight: 53.1 kg (117 lb 1 oz) 53.4 kg (117 lb 11.6 oz) 53 kg (116 lb 13.5 oz)     Vital Signs with Ranges  Temp:  [97.9  F (36.6  C)-100  F (37.8  C)] 97.9  F (36.6  C)  Pulse:  [104-134] 115  Resp:  [14-34] 30  BP: ()/(47-84) 112/55  SpO2:  [96 %-100 %] 97 %  I/O last 3 completed shifts:  In: 2650 [I.V.:800; NG/GT:750]  Out: 2003.6 [Urine:1500; Other:3.6; Stool:500]     , Blood pressure 112/55, pulse 115, temperature 97.9  F (36.6  C), temperature source  Axillary, resp. rate 30, weight 53 kg (116 lb 13.5 oz), SpO2 97 %, not currently breastfeeding.  116 lbs 13.5 oz  HEENT:  Normocephalic.  PERRLA.    Heart:  No peripheral edema  Lungs:  No SOB  Skin:  Warm and dry, good capillary refill. Incision CDI.  Extremities:  Good radial and dorsalis pedis pulses bilaterally, no edema, cyanosis or clubbing.    NEUROLOGICAL EXAMINATION:   Mental status:  Opens eyes to stimuli, intermittently to command  Motor:  Moving extremities spontaneously    EVD clamped    Medications     - MEDICATION INSTRUCTIONS -         ceFEPIme (MAXIPIME) IV  2 g Intravenous Q8H     insulin aspart  1-6 Units Subcutaneous Q4H     levETIRAcetam  1,000 mg Intravenous Q12H     metroNIDAZOLE  500 mg Intravenous Q8H     multivitamins w/minerals  15 mL Per Feeding Tube Daily     OXcarbazepine  450 mg Oral or Feeding Tube At Bedtime     pantoprazole  40 mg Oral or Feeding Tube QAM AC     potassium & sodium phosphates  1 packet Oral or Feeding Tube Q4H     sodium chloride (PF)  10-40 mL Intracatheter Q7 Days     topiramate  50 mg Oral or Feeding Tube At Bedtime     vancomycin  125 mg Oral or Feeding Tube 4x Daily     vancomycin  1,000 mg Intravenous Q12H       Data     CBC RESULTS:   Recent Labs   Lab Test 08/06/22  0705 08/05/22  0558   WBC 25.0* 12.1*   RBC 4.44 4.16   HGB 9.2* 8.6*   HCT 31.5* 29.8*   MCV 71* 72*   MCH 20.7* 20.7*   MCHC 29.2* 28.9*   RDW 18.7* 19.1*   PLT  --  352     Basic Metabolic Panel:  Lab Results   Component Value Date     08/06/2022      Lab Results   Component Value Date    POTASSIUM 3.8 08/06/2022     Lab Results   Component Value Date    CHLORIDE 101 08/06/2022     Lab Results   Component Value Date    ADY 9.3 08/06/2022     Lab Results   Component Value Date    CO2 27 08/06/2022     Lab Results   Component Value Date    BUN 14 08/06/2022     Lab Results   Component Value Date    CR 0.42 08/06/2022     Lab Results   Component Value Date     08/06/2022      08/06/2022     INR:  Lab Results   Component Value Date    INR 1.34 07/29/2022    INR 1.13 07/27/2022    INR 1.36 07/19/2022    INR 1.05 02/07/2022    INR 0.97 02/06/2019

## 2022-08-07 ENCOUNTER — APPOINTMENT (OUTPATIENT)
Dept: CT IMAGING | Facility: CLINIC | Age: 77
DRG: 023 | End: 2022-08-07
Attending: NURSE PRACTITIONER
Payer: COMMERCIAL

## 2022-08-07 LAB
ANION GAP SERPL CALCULATED.3IONS-SCNC: 4 MMOL/L (ref 3–14)
BASOPHILS # BLD AUTO: 0.1 10E3/UL (ref 0–0.2)
BASOPHILS NFR BLD AUTO: 0 %
BUN SERPL-MCNC: 16 MG/DL (ref 7–30)
CALCIUM SERPL-MCNC: 8 MG/DL (ref 8.5–10.1)
CHLORIDE BLD-SCNC: 104 MMOL/L (ref 94–109)
CO2 SERPL-SCNC: 28 MMOL/L (ref 20–32)
CREAT SERPL-MCNC: 0.37 MG/DL (ref 0.52–1.04)
CRP SERPL-MCNC: 235 MG/L (ref 0–8)
EOSINOPHIL # BLD AUTO: 0 10E3/UL (ref 0–0.7)
EOSINOPHIL NFR BLD AUTO: 0 %
ERYTHROCYTE [DISTWIDTH] IN BLOOD BY AUTOMATED COUNT: 18.8 % (ref 10–15)
ERYTHROCYTE [DISTWIDTH] IN BLOOD BY AUTOMATED COUNT: 18.8 % (ref 10–15)
GFR SERPL CREATININE-BSD FRML MDRD: >90 ML/MIN/1.73M2
GLUCOSE BLD-MCNC: 154 MG/DL (ref 70–99)
GLUCOSE BLDC GLUCOMTR-MCNC: 157 MG/DL (ref 70–99)
GLUCOSE BLDC GLUCOMTR-MCNC: 164 MG/DL (ref 70–99)
GLUCOSE BLDC GLUCOMTR-MCNC: 195 MG/DL (ref 70–99)
GLUCOSE BLDC GLUCOMTR-MCNC: 203 MG/DL (ref 70–99)
GLUCOSE BLDC GLUCOMTR-MCNC: 225 MG/DL (ref 70–99)
GLUCOSE BLDC GLUCOMTR-MCNC: 237 MG/DL (ref 70–99)
HCT VFR BLD AUTO: 22.2 % (ref 35–47)
HCT VFR BLD AUTO: 25.5 % (ref 35–47)
HGB BLD-MCNC: 6.6 G/DL (ref 11.7–15.7)
HGB BLD-MCNC: 7.3 G/DL (ref 11.7–15.7)
HOLD SPECIMEN: NORMAL
IMM GRANULOCYTES # BLD: 0.1 10E3/UL
IMM GRANULOCYTES NFR BLD: 1 %
LYMPHOCYTES # BLD AUTO: 1.2 10E3/UL (ref 0.8–5.3)
LYMPHOCYTES NFR BLD AUTO: 9 %
MAGNESIUM SERPL-MCNC: 2.3 MG/DL (ref 1.6–2.3)
MCH RBC QN AUTO: 20.6 PG (ref 26.5–33)
MCH RBC QN AUTO: 21 PG (ref 26.5–33)
MCHC RBC AUTO-ENTMCNC: 28.6 G/DL (ref 31.5–36.5)
MCHC RBC AUTO-ENTMCNC: 29.7 G/DL (ref 31.5–36.5)
MCV RBC AUTO: 71 FL (ref 78–100)
MCV RBC AUTO: 72 FL (ref 78–100)
MONOCYTES # BLD AUTO: 1.4 10E3/UL (ref 0–1.3)
MONOCYTES NFR BLD AUTO: 10 %
NEUTROPHILS # BLD AUTO: 11.1 10E3/UL (ref 1.6–8.3)
NEUTROPHILS NFR BLD AUTO: 80 %
NRBC # BLD AUTO: 0 10E3/UL
NRBC BLD AUTO-RTO: 0 /100
PHOSPHATE SERPL-MCNC: 2.3 MG/DL (ref 2.5–4.5)
PLATELET # BLD AUTO: 380 10E3/UL (ref 150–450)
PLATELET # BLD AUTO: 416 10E3/UL (ref 150–450)
POTASSIUM BLD-SCNC: 3.8 MMOL/L (ref 3.4–5.3)
RBC # BLD AUTO: 3.15 10E6/UL (ref 3.8–5.2)
RBC # BLD AUTO: 3.54 10E6/UL (ref 3.8–5.2)
SODIUM SERPL-SCNC: 136 MMOL/L (ref 133–144)
VANCOMYCIN SERPL-MCNC: 15.4 MG/L
WBC # BLD AUTO: 13.9 10E3/UL (ref 4–11)
WBC # BLD AUTO: 15 10E3/UL (ref 4–11)

## 2022-08-07 PROCEDURE — 250N000009 HC RX 250: Performed by: HOSPITALIST

## 2022-08-07 PROCEDURE — 85027 COMPLETE CBC AUTOMATED: CPT | Performed by: HOSPITALIST

## 2022-08-07 PROCEDURE — 258N000003 HC RX IP 258 OP 636: Performed by: HOSPITALIST

## 2022-08-07 PROCEDURE — 250N000011 HC RX IP 250 OP 636: Performed by: INTERNAL MEDICINE

## 2022-08-07 PROCEDURE — 84100 ASSAY OF PHOSPHORUS: CPT | Performed by: INTERNAL MEDICINE

## 2022-08-07 PROCEDURE — 250N000013 HC RX MED GY IP 250 OP 250 PS 637: Performed by: INTERNAL MEDICINE

## 2022-08-07 PROCEDURE — 200N000001 HC R&B ICU

## 2022-08-07 PROCEDURE — 99232 SBSQ HOSP IP/OBS MODERATE 35: CPT | Performed by: SPECIALIST

## 2022-08-07 PROCEDURE — 83735 ASSAY OF MAGNESIUM: CPT | Performed by: HOSPITALIST

## 2022-08-07 PROCEDURE — 999N000190 HC STATISTIC VAT ROUNDS

## 2022-08-07 PROCEDURE — 99232 SBSQ HOSP IP/OBS MODERATE 35: CPT | Performed by: HOSPITALIST

## 2022-08-07 PROCEDURE — 36415 COLL VENOUS BLD VENIPUNCTURE: CPT | Performed by: HOSPITALIST

## 2022-08-07 PROCEDURE — 86140 C-REACTIVE PROTEIN: CPT | Performed by: INTERNAL MEDICINE

## 2022-08-07 PROCEDURE — 80202 ASSAY OF VANCOMYCIN: CPT | Performed by: INTERNAL MEDICINE

## 2022-08-07 PROCEDURE — 70450 CT HEAD/BRAIN W/O DYE: CPT

## 2022-08-07 PROCEDURE — 85004 AUTOMATED DIFF WBC COUNT: CPT | Performed by: INTERNAL MEDICINE

## 2022-08-07 PROCEDURE — 250N000013 HC RX MED GY IP 250 OP 250 PS 637

## 2022-08-07 PROCEDURE — 250N000011 HC RX IP 250 OP 636: Performed by: SPECIALIST

## 2022-08-07 PROCEDURE — 80048 BASIC METABOLIC PNL TOTAL CA: CPT | Performed by: INTERNAL MEDICINE

## 2022-08-07 RX ADMIN — Medication 15 ML: at 08:15

## 2022-08-07 RX ADMIN — OXYCODONE HYDROCHLORIDE 5 MG: 5 TABLET ORAL at 18:01

## 2022-08-07 RX ADMIN — ACETAMINOPHEN 650 MG: 325 SUSPENSION ORAL at 20:22

## 2022-08-07 RX ADMIN — ACETAMINOPHEN 650 MG: 325 SUSPENSION ORAL at 04:12

## 2022-08-07 RX ADMIN — ACETAMINOPHEN 650 MG: 325 SUSPENSION ORAL at 11:46

## 2022-08-07 RX ADMIN — OXYCODONE HYDROCHLORIDE 5 MG: 5 TABLET ORAL at 13:15

## 2022-08-07 RX ADMIN — LEVETIRACETAM 1000 MG: 10 INJECTION INTRAVENOUS at 22:16

## 2022-08-07 RX ADMIN — VANCOMYCIN HYDROCHLORIDE 1000 MG: 1 INJECTION, SOLUTION INTRAVENOUS at 20:15

## 2022-08-07 RX ADMIN — CEFEPIME HYDROCHLORIDE 2 G: 2 INJECTION, POWDER, FOR SOLUTION INTRAVENOUS at 13:19

## 2022-08-07 RX ADMIN — INSULIN ASPART 2 UNITS: 100 INJECTION, SOLUTION INTRAVENOUS; SUBCUTANEOUS at 20:39

## 2022-08-07 RX ADMIN — CEFEPIME HYDROCHLORIDE 2 G: 2 INJECTION, POWDER, FOR SOLUTION INTRAVENOUS at 05:15

## 2022-08-07 RX ADMIN — METRONIDAZOLE 500 MG: 500 INJECTION, SOLUTION INTRAVENOUS at 07:51

## 2022-08-07 RX ADMIN — VANCOMYCIN HYDROCHLORIDE 125 MG: KIT at 13:19

## 2022-08-07 RX ADMIN — LEVETIRACETAM 1000 MG: 10 INJECTION INTRAVENOUS at 09:58

## 2022-08-07 RX ADMIN — OXCARBAZEPINE 450 MG: 300 SUSPENSION ORAL at 22:16

## 2022-08-07 RX ADMIN — METRONIDAZOLE 500 MG: 500 INJECTION, SOLUTION INTRAVENOUS at 00:05

## 2022-08-07 RX ADMIN — TOPIRAMATE 50 MG: 50 TABLET ORAL at 22:16

## 2022-08-07 RX ADMIN — INSULIN ASPART 2 UNITS: 100 INJECTION, SOLUTION INTRAVENOUS; SUBCUTANEOUS at 00:09

## 2022-08-07 RX ADMIN — ACETAMINOPHEN 650 MG: 325 SUSPENSION ORAL at 16:10

## 2022-08-07 RX ADMIN — INSULIN ASPART 2 UNITS: 100 INJECTION, SOLUTION INTRAVENOUS; SUBCUTANEOUS at 07:39

## 2022-08-07 RX ADMIN — VANCOMYCIN HYDROCHLORIDE 125 MG: KIT at 08:15

## 2022-08-07 RX ADMIN — VANCOMYCIN HYDROCHLORIDE 1000 MG: 1 INJECTION, SOLUTION INTRAVENOUS at 07:51

## 2022-08-07 RX ADMIN — METRONIDAZOLE 500 MG: 500 INJECTION, SOLUTION INTRAVENOUS at 16:11

## 2022-08-07 RX ADMIN — INSULIN ASPART 2 UNITS: 100 INJECTION, SOLUTION INTRAVENOUS; SUBCUTANEOUS at 11:46

## 2022-08-07 RX ADMIN — OXYCODONE HYDROCHLORIDE 5 MG: 5 TABLET ORAL at 01:15

## 2022-08-07 RX ADMIN — MORPHINE SULFATE 2 MG: 4 INJECTION, SOLUTION INTRAMUSCULAR; INTRAVENOUS at 20:36

## 2022-08-07 RX ADMIN — CEFEPIME HYDROCHLORIDE 2 G: 2 INJECTION, POWDER, FOR SOLUTION INTRAVENOUS at 22:16

## 2022-08-07 RX ADMIN — ACETAMINOPHEN 650 MG: 325 SUSPENSION ORAL at 08:15

## 2022-08-07 RX ADMIN — Medication 40 MG: at 08:00

## 2022-08-07 RX ADMIN — INSULIN ASPART 1 UNITS: 100 INJECTION, SOLUTION INTRAVENOUS; SUBCUTANEOUS at 16:09

## 2022-08-07 RX ADMIN — SODIUM PHOSPHATE, MONOBASIC, MONOHYDRATE 9 MMOL: 276; 142 INJECTION, SOLUTION INTRAVENOUS at 08:00

## 2022-08-07 RX ADMIN — INSULIN ASPART 1 UNITS: 100 INJECTION, SOLUTION INTRAVENOUS; SUBCUTANEOUS at 04:20

## 2022-08-07 RX ADMIN — OXYCODONE HYDROCHLORIDE 5 MG: 5 TABLET ORAL at 22:16

## 2022-08-07 RX ADMIN — VANCOMYCIN HYDROCHLORIDE 125 MG: KIT at 18:01

## 2022-08-07 RX ADMIN — VANCOMYCIN HYDROCHLORIDE 125 MG: KIT at 22:16

## 2022-08-07 ASSESSMENT — ACTIVITIES OF DAILY LIVING (ADL)
ADLS_ACUITY_SCORE: 44

## 2022-08-07 NOTE — PLAN OF CARE
Goal Outcome Evaluation:    Pt more awake today, following command, deconditioned, disoriented X3. C/o headache. Prn tylenol and oxy given with some relief, turned every 2 hours, rectal tube and purewick in place, TF at goal, free water flush to 100ml/4hr.EVD clamped since 8/5, ICP 4-6. Continue to monitor

## 2022-08-07 NOTE — PROGRESS NOTES
[facility]    Neurosurgery Progress Note    Date of Service (when I saw the patient): 08/07/2022     Assessment & Plan     Procedure(s):  LEFT FRONTAL VENTRICULOSTOMY   -7 Days Post-Op  History of severe sepsis 2/2 ventriculitis with abscess. EVD clamped yesterday. Head CT with development of left lateral ventricle hydrocephalus. On exam today she opens eyes to voice, tells me her name and moves extremities to commands equally. She denies pain. Incision CDI.     Plan:  -Continue clamped EVD for now, continue to monitor neurologic status and ICP for now   -Continue supportive and symptomatic cares  -Pain control as needed  -Routine wound care    I have discussed the following assessment and plan Dr. Santiago who is in agreement with initial plan and will follow up with further consultation recommendations.    Hafsa Layton CNP  [facility] Neurosurgery  [facility]  [address]  [address]  [place] [address]    Tel [phone]  Pager [phone]      Interval History   Stable. Improving slowly.    Physical Exam   Temp: 97.8  F (36.6  C) Temp src: Oral BP: 93/48 Pulse: 93   Resp: 21 SpO2: 97 % O2 Device: None (Room air)    Vitals:    08/04/22 0700 08/05/22 0500 08/06/22 0600   Weight: 53.1 kg (117 lb 1 oz) 53.4 kg (117 lb 11.6 oz) 53 kg (116 lb 13.5 oz)     Vital Signs with Ranges  Temp:  [97.8  F (36.6  C)-98.3  F (36.8  C)] 97.8  F (36.6  C)  Pulse:  [] 93  Resp:  [14-31] 21  BP: ()/(46-61) 93/48  SpO2:  [96 %-100 %] 97 %  I/O last 3 completed shifts:  In: 3670 [I.V.:1490; NG/GT:980]  Out: 1600 [Urine:800; Stool:800]     , Blood pressure 93/48, pulse 93, temperature 97.8  F (36.6  C), temperature source Oral, resp. rate 21, weight 53 kg (116 lb 13.5 oz), SpO2 97 %, not currently breastfeeding.  116 lbs 13.5 oz  HEENT:  Normocephalic.  PERRLA.    Heart:  No peripheral edema  Lungs:  No SOB  Skin:  Warm and dry, good capillary refill.  Incision CDI.  Extremities:  Good radial and dorsalis pedis pulses bilaterally, no edema, cyanosis or clubbing.    NEUROLOGICAL EXAMINATION:   Mental status:  Opens eyes to voice, oriented to person, follows some commands  Motor:  Moving all extremities equally to command    EVD clamped    Medications     - MEDICATION INSTRUCTIONS -         ceFEPIme (MAXIPIME) IV  2 g Intravenous Q8H     insulin aspart  1-6 Units Subcutaneous Q4H     levETIRAcetam  1,000 mg Intravenous Q12H     metroNIDAZOLE  500 mg Intravenous Q8H     multivitamins w/minerals  15 mL Per Feeding Tube Daily     OXcarbazepine  450 mg Oral or Feeding Tube At Bedtime     pantoprazole  40 mg Oral or Feeding Tube QAM AC     sodium chloride (PF)  10-40 mL Intracatheter Q7 Days     topiramate  50 mg Oral or Feeding Tube At Bedtime     vancomycin  125 mg Oral or Feeding Tube 4x Daily     vancomycin  1,000 mg Intravenous Q12H       Data     CBC RESULTS:   Recent Labs   Lab Test 08/06/22  0705 08/05/22  0558   WBC 25.0* 12.1*   RBC 4.44 4.16   HGB 9.2* 8.6*   HCT 31.5* 29.8*   MCV 71* 72*   MCH 20.7* 20.7*   MCHC 29.2* 28.9*   RDW 18.7* 19.1*   PLT  --  352     Basic Metabolic Panel:  Lab Results   Component Value Date     08/06/2022      Lab Results   Component Value Date    POTASSIUM 3.8 08/06/2022     Lab Results   Component Value Date    CHLORIDE 101 08/06/2022     Lab Results   Component Value Date    ADY 9.3 08/06/2022     Lab Results   Component Value Date    CO2 27 08/06/2022     Lab Results   Component Value Date    BUN 14 08/06/2022     Lab Results   Component Value Date    CR 0.42 08/06/2022     Lab Results   Component Value Date     08/06/2022     08/06/2022     INR:  Lab Results   Component Value Date    INR 1.34 07/29/2022    INR 1.13 07/27/2022    INR 1.36 07/19/2022    INR 1.05 02/07/2022    INR 0.97 02/06/2019

## 2022-08-07 NOTE — PHARMACY-VANCOMYCIN DOSING SERVICE
Pharmacy Vancomycin Note  Date of Service 2022  Patient's  1945   77 year old, female    Indication: Meningitis  Day of Therapy: 18  Current vancomycin regimen:  1000 mg IV q12h  Current vancomycin monitoring method: Trough (Method 1 = dosing nomogram)  Current vancomycin therapeutic monitoring goal: 15-20 mg/L       Current estimated CrCl = Estimated Creatinine Clearance: 106.5 mL/min (A) (based on SCr of 0.37 mg/dL (L)).    Creatinine for last 3 days  2022:  2:27 PM Creatinine 0.38 mg/dL  2022:  7:05 AM Creatinine 0.42 mg/dL  2022:  6:12 AM Creatinine 0.37 mg/dL    Recent Vancomycin Levels (past 3 days)  2022:  6:12 AM Vancomycin 15.4 mg/L    Vancomycin IV Administrations (past 72 hours)                   vancomycin (VANCOCIN) 1000 mg in dextrose 5% 200 mL PREMIX (mg) 1,000 mg New Bag 22 1957     1,000 mg New Bag  0740     1,000 mg New Bag 22 1807     1,000 mg New Bag  0704     1,000 mg New Bag 22 1817                Nephrotoxins and other renal medications (From now, onward)    Start     Dose/Rate Route Frequency Ordered Stop    22 0900  vancomycin (FIRVANQ) oral solution 125 mg         125 mg Oral or Feeding Tube 4 TIMES DAILY 22 0819      22 0500  vancomycin (VANCOCIN) 1000 mg in dextrose 5% 200 mL PREMIX         1,000 mg  200 mL/hr over 1 Hours Intravenous EVERY 12 HOURS 22 2242               Contrast Orders - past 72 hours (72h ago, onward)    None          Interpretation of levels and current regimen:  Vancomycin level is reflective of therapeutic level    Has serum creatinine changed greater than 50% in last 72 hours: No    Urine output:  unable to determine    Renal Function: Stable      Plan:  1. Continue Current Dose  2. Vancomycin monitoring method: Trough (Method 1 = dosing nomogram)  3. Vancomycin therapeutic monitoring goal: 15-20 mg/L  4. Pharmacy will check vancomycin levels as appropriate in 3-5 Days.  5. Serum creatinine  levels will be ordered daily for the first week of therapy and at least twice weekly for subsequent weeks.    Toñito Trevizo [facility]

## 2022-08-07 NOTE — PROGRESS NOTES
[facility]    Medicine Progress Note - Hospitalist Service    Date of Admission:  7/27/2022    Assessment & Plan            This is a 77-year-old woman with medical history which includes trigeminal neuralgia who was recently admitted to [facility] on 7/19 for severe sepsis(had leukocytosis, lactic acidosis and elevated procalcitonin)   due to suspected COVID-19 infection and suspected bacterial pneumonia. recent COVID-19 infection which was diagnosed on 7/19/2022 and was treated with 5 days of remdesivir and did not require any steroids and she was treated with IV vancomycin and IV meropenem.    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  She was switched to IV vancomycin, cefepime and metronidazole and transferred to [facility] for neurosurgery assessment. MRI brain w/wo contrast 7/30 showed ventriculitis with probable abscess. Neurosurgery/neurology/infectious disease/oncology consulted. Patient taken to the OR on 7/31 for left frontal ventriculostomy.         1) severe sepsis secondary due to ventriculitis with abscess-improving  Subdural fluid collection  Status left frontal ventriculostomy 7/31/22  -He had imaging done as above which showed ventriculitis with probable abscess  -Neurosurgery and oncology were consulted and flow cytometry was done which did not show any evidence of malignancy  -Patient underwent left frontal ventriculostomy on 7/31/2022 and has been followed by the neurosurgery team when she had repeat CT scan of the head done on 8/3 which showed markedly decompressed left lateral ventricle with new hypodense subdural fluid collection representing subdural hygromas or hematomas  -ID has been consulted  during this hospital stay and her cultures are being monitored and her blood cultures are negative  - CSF sent on 8/4  which revealed , , glucose 98 and protein 84. WBC this AM 25, . CSF 2cc for repeat labs today. CSF with sediment.   -EVD was clamped on 8.5    - Patient has been on vancomycin , ceftriaxone and flagyl and oral vancomycin and as wbc count did go up the ceftriaxone was switched to cefepime and patient was seen by ID and NSG  - 8/7-WBC count today has trended down to 15 but her CRP continues to increase and on exam patient is more alert and is following commands and is speaking few words.  Repeat CT scan of the head shows stable hydrocephalus in the left lateral ventricle  -We will continue the patient with IV vancomycin, cefepime, Flagyl and oral vancomycin for C. difficile and appreciate input from ID and neurosurgery team and definitive management of the tube per the neurosurgery team and we will continue to monitor labs every day      2) acute infectious encephalopathy due to above -improving  -On exam patient is more alert and oriented and was able to wiggle her toes and speak few sentences  - Management with antibiotics     3)  sinus tachycardia  -We will continue to monitor and she is on as needed metoprolol and will continue to treat her infection    4) C. difficile colitis  -Patient was noted to have copious diarrhea on 8/1 and sample came back positive for C. difficile and she was started on oral vancomycin and Bactrim tube is in place and ID is following the patient    5) history of trigeminal neuralgia  -We will continue the patient with Trileptal and Topamax    6) history of chronic pyloric ulcer  -She had EGD done in 2020 which showed gastroduodenitis with a duodenal stricture and continue the patient with IV PPI    7) hyperglycemia  -Her recent HbA1c was 5.6 and she is on multiple medications and will continue with sliding scale    8) hypokalemia and  hyponatremia-resolved  Hypophosphatemia  -She does have normal sodium at 136, potassium of 3.8 and has phosphorus of 2.3 and we will continue to monitor and continue with replacement protocol    9) severe malnutrition  -Patient had nutrition assessment done and continue with tube feeds and we did decrease the rate of free water flushes from 150 every 4 hours 200 every 4 hours    10) acute on chronic anemia  -I did review the prior labs and her hemoglobin is between 9-10 at baseline  -Hemoglobin on admission was 11.7 and lowest has been 7.4 and is 9.2 today  -She had iron panel done which showed that her total iron was low at 14, binding capacity was low at 28, iron saturation was 6 and B12 was high and folate was normal  -Her hemoglobin today today is 7.3 and she is hemodynamically stable and we will continue to monitor her hemoglobin every day and continue with Protonix as she is critically ill    11) physical deconditioning  -Patient seems severely deconditioned and wants to his output from the brain then we can have her work with physical therapy and Occupational Therapy           Diet: Adult Formula Drip Feeding: Continuous Osmolite 1.5; Other - Specify in Comment; Goal Rate: 50; mL/hr; Medication - Feeding Tube Flush Frequency: At least 15-30 mL water before and after medication administration and with tube clogging; Amount to ...    DVT Prophylaxis: Pneumatic Compression Devices  Abrams Catheter: Not present  Central Lines: PRESENT  PICC Triple Lumen 07/22/22 Right Basilic Vascular access-Site Assessment: WDL  Cardiac Monitoring: ACTIVE order. Indication: ICU  Code Status: Full Code      Disposition Plan     Expected Discharge Date: 08/08/2022        Discharge Comments: Needs NG feeding tube placed        The patient's care was discussed with the Bedside Nurse, Patient and Patient's Family.  I did update the patient daughter Ana today at 106 pm and her questions were answered     Stephon Madsen  MD  Hospitalist Service  [facility]  Securely message with the [employer] Web Console (learn more here)  Text page via [employer] Paging/Directory         Clinically Significant Risk Factors Present on Admission                    She is critically ill and her prognosis is guarded    I am off service from tomorrow morning and her care will be taken over by hospital medicine team  ______________________________________________________________________    Interval History     I saw the patient today and nurse was present in the room and she is more alert and oriented and was speaking full sentences and follows commands and wiggle her toes.  No fever or chills.  She continues to have output from the rectal tube.  She did underwent CT scan of the head this morning.    I did discuss with the patient's nurse about plan to decrease free water flushes from 1  every 4 hours    Data reviewed today: I reviewed all medications, new labs and imaging results over the last 24 hours. I personally reviewed the head CT image(s) showing Stable hydrocephalus.    Physical Exam   Vital Signs: Temp: 98.3  F (36.8  C) Temp src: Oral BP: 118/53 Pulse: 101   Resp: 20 SpO2: 99 % O2 Device: None (Room air)    Weight: 116 lbs 13.5 oz        General: She is more alert and oriented today and did say some words and has been following commands   HEENT: JANN, surgical scar and drain is in place  Neck: Neck is supple   Respiratory: Clear to auscultation with no wheeze  Cardiovascular: Regular rate , S1 and S2 normal with no murmer or rubs or gallops  Abdomen:   soft , non tender , non distended and bowel sound present   Skin: No skin rashes or lesions to inspection or palpation.  Neurologic: She is more alert and was able to wiggle her toes  Musculoskeletal: Moving lower extremities and she seems to be weak to move upper extremity  Psychiatric: Cooperative  Lines: IV, rectal tube, feeding tube  And she has a drain in the  brain    Data   Recent Labs   Lab 08/07/22  0740 08/07/22  0738 08/07/22  0612 08/07/22  0417 08/06/22  0750 08/06/22  0705 08/05/22  0817 08/05/22  0558 08/04/22  1901 08/04/22  1427   WBC 15.0*  --  13.9*  --   --  25.0*  --  12.1*  --   --    HGB 7.3*  --  6.6*  --   --  9.2*  --  8.6*  --   --    MCV 72*  --  71*  --   --  71*  --  72*  --   --      --  380  --   --   --   --  352  --   --    NA  --   --  136  --   --  134  --   --   --  134   POTASSIUM  --   --  3.8  --   --  3.8  --  3.6  --  3.9   CHLORIDE  --   --  104  --   --  101  --   --   --  100   CO2  --   --  28  --   --  27  --   --   --  25   BUN  --   --  16  --   --  14  --   --   --  7   CR  --   --  0.37*  --   --  0.42*  --   --   --  0.38*   ANIONGAP  --   --  4  --   --  6  --   --   --  9   ADY  --   --  8.0*  --   --  9.3  --   --   --  8.4*   GLC  --  195* 154* 164*   < > 167*   < >  --    < > 225*    < > = values in this interval not displayed.     Recent Results (from the past 24 hour(s))   CT Head w/o Contrast    Narrative    EXAM: CT HEAD W/O CONTRAST  LOCATION: [facility]  DATE/TIME: 8/7/2022 4:58 AM    INDICATION: EVD clamped, evaluate hydrocephalus  COMPARISON: 8/6/2022  TECHNIQUE: Routine CT Head without IV contrast. Multiplanar reformats. Dose reduction techniques were used.    FINDINGS:  INTRACRANIAL CONTENTS: Left frontal approach ventriculostomy catheter terminates along the body of the left lateral ventricle. Essentially stable hydrocephalus of the left lateral ventricle. Decompressed right lateral ventricle. No CT evidence of acute   infarct. Stable low-density changes in the left frontal lobe. Chronic lacunar infarcts bilateral cerebellar hemispheres    VISUALIZED ORBITS/SINUSES/MASTOIDS: No intraorbital abnormality. No paranasal sinus mucosal disease. No middle ear or mastoid effusion.    BONES/SOFT TISSUES: No acute abnormality.      Impression    IMPRESSION:  1.  Stable hydrocephalus in  the left lateral ventricle.  2.  No significant change from prior.     Medications     - MEDICATION INSTRUCTIONS -         ceFEPIme (MAXIPIME) IV  2 g Intravenous Q8H     insulin aspart  1-6 Units Subcutaneous Q4H     levETIRAcetam  1,000 mg Intravenous Q12H     metroNIDAZOLE  500 mg Intravenous Q8H     multivitamins w/minerals  15 mL Per Feeding Tube Daily     OXcarbazepine  450 mg Oral or Feeding Tube At Bedtime     pantoprazole  40 mg Oral or Feeding Tube QAM AC     sodium chloride (PF)  10-40 mL Intracatheter Q7 Days     sodium phosphate  9 mmol Intravenous Once     topiramate  50 mg Oral or Feeding Tube At Bedtime     vancomycin  125 mg Oral or Feeding Tube 4x Daily     vancomycin  1,000 mg Intravenous Q12H

## 2022-08-07 NOTE — PROGRESS NOTES
[facility]    Infectious Disease Progress Note    Date of Service : 08/07/2022     Assessment:  Patient transferred 7/27 from [facility] for concern for brain abscess (recovered COVID) - MRI suggestive of ventriculitis with left lateral ventricle abscess. Has been on metronidaole/ceftriaxone/vancomycin, s/p left frontal ventriculostomy  CSF cxs with no growth. Now has developed C.diff colitis..     Recommendations  1.Continue IV Vancomycin, Cefepimen and Metronidazole as well as oral vancomycin for C.diff  2. Neurosurgical team managing ventriculostomy  Follow clinical status and labs    Nuzhat Hudson MD    Interval History   Alert and awake today, answers questions appropriately. Remained afebrile, leukocytosis is improving    Physical Exam   Temp: 97.8  F (36.6  C) Temp src: Oral BP: 93/48 Pulse: 93   Resp: 21 SpO2: 97 % O2 Device: None (Room air)    Vitals:    08/04/22 0700 08/05/22 0500 08/06/22 0600   Weight: 53.1 kg (117 lb 1 oz) 53.4 kg (117 lb 11.6 oz) 53 kg (116 lb 13.5 oz)     Vital Signs with Ranges  Temp:  [97.8  F (36.6  C)-98.3  F (36.8  C)] 97.8  F (36.6  C)  Pulse:  [] 93  Resp:  [14-31] 21  BP: ()/(46-61) 93/48  SpO2:  [96 %-100 %] 97 %    Constitutional: Awake, alert, cooperative,   Neuro : L frontal ventriculostomy in place  Lungs: Clear to auscultation bilaterally  Cardiovascular: Regular rate and rhythm, normal S1 and S2, and no murmur noted  Abdomen: Normal bowel sounds, soft, non-distended, non-tender  Skin: No rashes, no cyanosis, no edema    Other:    Medications     - MEDICATION INSTRUCTIONS -         ceFEPIme (MAXIPIME) IV  2 g Intravenous Q8H     insulin aspart  1-6 Units Subcutaneous Q4H     levETIRAcetam  1,000 mg Intravenous Q12H     metroNIDAZOLE  500 mg Intravenous Q8H     multivitamins w/minerals  15 mL Per Feeding Tube Daily     OXcarbazepine  450 mg Oral or Feeding Tube At Bedtime     pantoprazole  40 mg Oral or Feeding Tube QAM AC     sodium  chloride (PF)  10-40 mL Intracatheter Q7 Days     topiramate  50 mg Oral or Feeding Tube At Bedtime     vancomycin  125 mg Oral or Feeding Tube 4x Daily     vancomycin  1,000 mg Intravenous Q12H       Data   All microbiology laboratory data reviewed.  Recent Labs   Lab Test 08/07/22  0740 08/07/22 0612 08/06/22 0705 08/05/22  0558   WBC 15.0* 13.9* 25.0* 12.1*   HGB 7.3* 6.6* 9.2* 8.6*   HCT 25.5* 22.2* 31.5* 29.8*   MCV 72* 71* 71* 72*    380  --  352     Recent Labs   Lab Test 08/07/22  0612 08/06/22  0705 08/04/22  1427   CR 0.37* 0.42* 0.38*     Recent Labs   Lab Test 08/08/20  0212   SED 13

## 2022-08-07 NOTE — PLAN OF CARE
Goal Outcome Evaluation:  Patient Neuro remains the same. Given PRN pain medications. EVD remains clapmed. ICPS 4-8. CT done this AM.

## 2022-08-08 ENCOUNTER — APPOINTMENT (OUTPATIENT)
Dept: CT IMAGING | Facility: CLINIC | Age: 77
DRG: 023 | End: 2022-08-08
Attending: PHYSICIAN ASSISTANT
Payer: COMMERCIAL

## 2022-08-08 LAB
ABO/RH(D): NORMAL
ANION GAP SERPL CALCULATED.3IONS-SCNC: 5 MMOL/L (ref 3–14)
ANTIBODY SCREEN: NEGATIVE
BLD PROD TYP BPU: NORMAL
BLOOD COMPONENT TYPE: NORMAL
BUN SERPL-MCNC: 12 MG/DL (ref 7–30)
CALCIUM SERPL-MCNC: 8.2 MG/DL (ref 8.5–10.1)
CHLORIDE BLD-SCNC: 102 MMOL/L (ref 94–109)
CO2 SERPL-SCNC: 27 MMOL/L (ref 20–32)
CODING SYSTEM: NORMAL
CREAT SERPL-MCNC: 0.36 MG/DL (ref 0.52–1.04)
CROSSMATCH: NORMAL
ERYTHROCYTE [DISTWIDTH] IN BLOOD BY AUTOMATED COUNT: 18.8 % (ref 10–15)
GFR SERPL CREATININE-BSD FRML MDRD: >90 ML/MIN/1.73M2
GLUCOSE BLD-MCNC: 139 MG/DL (ref 70–99)
GLUCOSE BLDC GLUCOMTR-MCNC: 131 MG/DL (ref 70–99)
GLUCOSE BLDC GLUCOMTR-MCNC: 160 MG/DL (ref 70–99)
GLUCOSE BLDC GLUCOMTR-MCNC: 179 MG/DL (ref 70–99)
GLUCOSE BLDC GLUCOMTR-MCNC: 180 MG/DL (ref 70–99)
GLUCOSE BLDC GLUCOMTR-MCNC: 199 MG/DL (ref 70–99)
GLUCOSE BLDC GLUCOMTR-MCNC: 201 MG/DL (ref 70–99)
GLUCOSE BLDC GLUCOMTR-MCNC: 218 MG/DL (ref 70–99)
HCT VFR BLD AUTO: 22.8 % (ref 35–47)
HGB BLD-MCNC: 6.4 G/DL (ref 11.7–15.7)
HGB BLD-MCNC: 6.9 G/DL (ref 11.7–15.7)
ISSUE DATE AND TIME: NORMAL
MAGNESIUM SERPL-MCNC: 2.2 MG/DL (ref 1.6–2.3)
MCH RBC QN AUTO: 20.4 PG (ref 26.5–33)
MCHC RBC AUTO-ENTMCNC: 28.1 G/DL (ref 31.5–36.5)
MCV RBC AUTO: 73 FL (ref 78–100)
PHOSPHATE SERPL-MCNC: 2.3 MG/DL (ref 2.5–4.5)
PLATELET # BLD AUTO: 378 10E3/UL (ref 150–450)
POTASSIUM BLD-SCNC: 4 MMOL/L (ref 3.4–5.3)
RBC # BLD AUTO: 3.14 10E6/UL (ref 3.8–5.2)
SODIUM SERPL-SCNC: 134 MMOL/L (ref 133–144)
SPECIMEN EXPIRATION DATE: NORMAL
UNIT ABO/RH: NORMAL
UNIT NUMBER: NORMAL
UNIT STATUS: NORMAL
UNIT TYPE ISBT: 6200
WBC # BLD AUTO: 9.9 10E3/UL (ref 4–11)

## 2022-08-08 PROCEDURE — 250N000013 HC RX MED GY IP 250 OP 250 PS 637: Performed by: INTERNAL MEDICINE

## 2022-08-08 PROCEDURE — 258N000003 HC RX IP 258 OP 636: Performed by: INTERNAL MEDICINE

## 2022-08-08 PROCEDURE — 999N000190 HC STATISTIC VAT ROUNDS

## 2022-08-08 PROCEDURE — 83735 ASSAY OF MAGNESIUM: CPT | Performed by: INTERNAL MEDICINE

## 2022-08-08 PROCEDURE — P9016 RBC LEUKOCYTES REDUCED: HCPCS | Performed by: NURSE PRACTITIONER

## 2022-08-08 PROCEDURE — 85018 HEMOGLOBIN: CPT | Performed by: NURSE PRACTITIONER

## 2022-08-08 PROCEDURE — 99233 SBSQ HOSP IP/OBS HIGH 50: CPT | Performed by: SPECIALIST

## 2022-08-08 PROCEDURE — 250N000011 HC RX IP 250 OP 636: Performed by: INTERNAL MEDICINE

## 2022-08-08 PROCEDURE — 250N000009 HC RX 250: Performed by: INTERNAL MEDICINE

## 2022-08-08 PROCEDURE — 85027 COMPLETE CBC AUTOMATED: CPT | Performed by: HOSPITALIST

## 2022-08-08 PROCEDURE — 200N000001 HC R&B ICU

## 2022-08-08 PROCEDURE — 80048 BASIC METABOLIC PNL TOTAL CA: CPT | Performed by: INTERNAL MEDICINE

## 2022-08-08 PROCEDURE — 250N000011 HC RX IP 250 OP 636: Performed by: HOSPITALIST

## 2022-08-08 PROCEDURE — 250N000013 HC RX MED GY IP 250 OP 250 PS 637

## 2022-08-08 PROCEDURE — 70450 CT HEAD/BRAIN W/O DYE: CPT

## 2022-08-08 PROCEDURE — 86850 RBC ANTIBODY SCREEN: CPT | Performed by: NURSE PRACTITIONER

## 2022-08-08 PROCEDURE — 99232 SBSQ HOSP IP/OBS MODERATE 35: CPT | Performed by: HOSPITALIST

## 2022-08-08 PROCEDURE — 86923 COMPATIBILITY TEST ELECTRIC: CPT | Performed by: NURSE PRACTITIONER

## 2022-08-08 PROCEDURE — 84100 ASSAY OF PHOSPHORUS: CPT | Performed by: INTERNAL MEDICINE

## 2022-08-08 PROCEDURE — 250N000011 HC RX IP 250 OP 636: Performed by: SPECIALIST

## 2022-08-08 RX ADMIN — VANCOMYCIN HYDROCHLORIDE 1000 MG: 1 INJECTION, SOLUTION INTRAVENOUS at 20:04

## 2022-08-08 RX ADMIN — ALTEPLASE 2 MG: 2.2 INJECTION, POWDER, LYOPHILIZED, FOR SOLUTION INTRAVENOUS at 16:20

## 2022-08-08 RX ADMIN — MORPHINE SULFATE 2 MG: 4 INJECTION, SOLUTION INTRAMUSCULAR; INTRAVENOUS at 02:11

## 2022-08-08 RX ADMIN — INSULIN ASPART 1 UNITS: 100 INJECTION, SOLUTION INTRAVENOUS; SUBCUTANEOUS at 16:30

## 2022-08-08 RX ADMIN — CEFEPIME HYDROCHLORIDE 2 G: 2 INJECTION, POWDER, FOR SOLUTION INTRAVENOUS at 21:05

## 2022-08-08 RX ADMIN — CEFEPIME HYDROCHLORIDE 2 G: 2 INJECTION, POWDER, FOR SOLUTION INTRAVENOUS at 12:18

## 2022-08-08 RX ADMIN — ALTEPLASE 2 MG: 2.2 INJECTION, POWDER, LYOPHILIZED, FOR SOLUTION INTRAVENOUS at 13:48

## 2022-08-08 RX ADMIN — ACETAMINOPHEN 650 MG: 325 SUSPENSION ORAL at 21:15

## 2022-08-08 RX ADMIN — METRONIDAZOLE 500 MG: 500 INJECTION, SOLUTION INTRAVENOUS at 07:31

## 2022-08-08 RX ADMIN — ONDANSETRON 4 MG: 4 TABLET, ORALLY DISINTEGRATING ORAL at 13:51

## 2022-08-08 RX ADMIN — ALTEPLASE 2 MG: 2.2 INJECTION, POWDER, LYOPHILIZED, FOR SOLUTION INTRAVENOUS at 13:47

## 2022-08-08 RX ADMIN — LEVETIRACETAM 1000 MG: 10 INJECTION INTRAVENOUS at 21:15

## 2022-08-08 RX ADMIN — VANCOMYCIN HYDROCHLORIDE 1000 MG: 1 INJECTION, SOLUTION INTRAVENOUS at 07:31

## 2022-08-08 RX ADMIN — CEFEPIME HYDROCHLORIDE 2 G: 2 INJECTION, POWDER, FOR SOLUTION INTRAVENOUS at 04:22

## 2022-08-08 RX ADMIN — LEVETIRACETAM 1000 MG: 10 INJECTION INTRAVENOUS at 09:29

## 2022-08-08 RX ADMIN — Medication 40 MG: at 07:31

## 2022-08-08 RX ADMIN — INSULIN ASPART 2 UNITS: 100 INJECTION, SOLUTION INTRAVENOUS; SUBCUTANEOUS at 08:28

## 2022-08-08 RX ADMIN — METRONIDAZOLE 500 MG: 500 INJECTION, SOLUTION INTRAVENOUS at 23:34

## 2022-08-08 RX ADMIN — INSULIN ASPART 2 UNITS: 100 INJECTION, SOLUTION INTRAVENOUS; SUBCUTANEOUS at 23:44

## 2022-08-08 RX ADMIN — OXYCODONE HYDROCHLORIDE 5 MG: 5 TABLET ORAL at 02:10

## 2022-08-08 RX ADMIN — Medication 15 ML: at 08:29

## 2022-08-08 RX ADMIN — INSULIN ASPART 1 UNITS: 100 INJECTION, SOLUTION INTRAVENOUS; SUBCUTANEOUS at 04:30

## 2022-08-08 RX ADMIN — INSULIN ASPART 1 UNITS: 100 INJECTION, SOLUTION INTRAVENOUS; SUBCUTANEOUS at 00:38

## 2022-08-08 RX ADMIN — OXYCODONE HYDROCHLORIDE 2.5 MG: 5 TABLET ORAL at 18:23

## 2022-08-08 RX ADMIN — METRONIDAZOLE 500 MG: 500 INJECTION, SOLUTION INTRAVENOUS at 16:20

## 2022-08-08 RX ADMIN — MORPHINE SULFATE 2 MG: 4 INJECTION, SOLUTION INTRAMUSCULAR; INTRAVENOUS at 21:15

## 2022-08-08 RX ADMIN — TOPIRAMATE 50 MG: 50 TABLET ORAL at 21:15

## 2022-08-08 RX ADMIN — INSULIN ASPART 2 UNITS: 100 INJECTION, SOLUTION INTRAVENOUS; SUBCUTANEOUS at 20:03

## 2022-08-08 RX ADMIN — ACETAMINOPHEN 650 MG: 325 SUSPENSION ORAL at 16:31

## 2022-08-08 RX ADMIN — VANCOMYCIN HYDROCHLORIDE 125 MG: KIT at 12:18

## 2022-08-08 RX ADMIN — SODIUM PHOSPHATE, MONOBASIC, MONOHYDRATE 9 MMOL: 276; 142 INJECTION, SOLUTION INTRAVENOUS at 11:01

## 2022-08-08 RX ADMIN — ACETAMINOPHEN 650 MG: 325 SUSPENSION ORAL at 06:11

## 2022-08-08 RX ADMIN — METRONIDAZOLE 500 MG: 500 INJECTION, SOLUTION INTRAVENOUS at 00:30

## 2022-08-08 RX ADMIN — OXCARBAZEPINE 450 MG: 300 SUSPENSION ORAL at 21:15

## 2022-08-08 RX ADMIN — VANCOMYCIN HYDROCHLORIDE 125 MG: KIT at 08:29

## 2022-08-08 RX ADMIN — ACETAMINOPHEN 650 MG: 325 SUSPENSION ORAL at 00:30

## 2022-08-08 RX ADMIN — VANCOMYCIN HYDROCHLORIDE 125 MG: KIT at 17:58

## 2022-08-08 RX ADMIN — VANCOMYCIN HYDROCHLORIDE 125 MG: KIT at 21:15

## 2022-08-08 RX ADMIN — ACETAMINOPHEN 650 MG: 325 SUSPENSION ORAL at 11:01

## 2022-08-08 ASSESSMENT — ACTIVITIES OF DAILY LIVING (ADL)
ADLS_ACUITY_SCORE: 44
ADLS_ACUITY_SCORE: 40
ADLS_ACUITY_SCORE: 44
ADLS_ACUITY_SCORE: 44
ADLS_ACUITY_SCORE: 40
ADLS_ACUITY_SCORE: 44

## 2022-08-08 NOTE — PLAN OF CARE
Goal Outcome Evaluation:    Plan of Care Reviewed With:  (Interdisciplinary bedside rounds)     Overall Patient Progress: no change    Outcome Evaluation: TF meeting needs while NPO    Nubia Ruiz, RD, LD, CNSC   Clinical Dietitian - [facility]

## 2022-08-08 NOTE — PROGRESS NOTES
Pt more awake tonight, mildly restless, following command, deconditioned, disoriented X3. C/o headache. Prn tylenol and oxy given with some relief, turned every 2 hours, rectal tube and purewick in place, TF at goal, free water flush to 100ml/4hr.EVD clamped since 8/5, ICP 4-6. Continue to monitor

## 2022-08-08 NOTE — PROGRESS NOTES
[facility]    Medicine Progress Note - Hospitalist Service    Date of Admission:  7/27/2022    Assessment & Plan        Julisa Chaney is a 77-year-old woman with medical history which includes trigeminal neuralgia who was recently admitted to [facility] on 7/19 for severe sepsis(had leukocytosis, lactic acidosis and elevated procalcitonin)   due to suspected COVID-19 infection and suspected bacterial pneumonia. recent COVID-19 infection which was diagnosed on 7/19/2022 and was treated with 5 days of remdesivir and did not require any steroids and she was treated with IV vancomycin and IV meropenem.     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  She was switched to IV vancomycin, cefepime and metronidazole and transferred to [facility] for neurosurgery assessment. MRI brain w/wo contrast 7/30 showed ventriculitis with probable abscess. Neurosurgery/neurology/infectious disease/oncology consulted. Patient taken to the OR on 7/31 for left frontal ventriculostomy.      Severe sepsis secondary to ventriculitis with abscess-improving  Status left frontal ventriculostomy 7/31/22  -Imaging as noted above.  -Neurosurgery and oncology were consulted and flow cytometry was done which did not show any evidence of malignancy.  -Patient underwent left frontal ventriculostomy on 7/31/2022. Post-op management per neurosurgery team.  -ID has been consulted during this hospital stay and her cultures are being monitored and her blood cultures are negative  -CSF sent on 8/5 which revealed , , glucose 98 and protein 84. WBC this AM 25, . CSF 2cc for repeat labs today. CSF with sediment.   -EVD was clamped on 8/6 and  removed on 8/8.  -ID following.  -Currently on IV vancomycin, cefepime, and metronidazole. ID planning 6 weeks of treatment with current regimen - unable to de-escalate as cultures have been negative.  -Repeat labs in AM.      Acute infectious encephalopathy due to above  Appears to be improving over the past few days.  -Continue to treat underlying issues as noted above.  -Reorient as needed.  -Maintain normal sleep/wake cycle as able.  -Avoid opioid pain medications as able.     Sinus tachycardia  -Treat underlying issues as noted.  -Continue to monitor.     C. difficile colitis  Patient was noted to have copious diarrhea on 8/1 and sample came back positive for C. Difficile.  -Continue oral vancomycin.  -Rectal tube still in place, continues to have significantly liquid stool output.  -ID following.    Acute on chronic anemia  Prior labs show baseline hemoglobin of about 9-10. Hemoglobin on admission was 11.7. Has slowly trended down, now low enough to recommend PRBC transfusion on 8/8/22. No obvious ongoing bleeding noted. WBC and platelets - normal-elevated. Iron panel on 7/30/22 showed that her total iron was low at 14, binding capacity was low at 28, iron saturation was 6 and B12 was high and folate was normal.  -Repeat hemoglobin today lower at 6.4, then 6.9 on recheck.  -Transfusing one unit of PRBCs today.  -She is iron deficient, consider IV iron transfusion.    History of trigeminal neuralgia  -Continue PTA Trileptal and Topamax.     History of chronic pyloric ulcer  She had EGD done in 2020 which showed gastroduodenitis with a duodenal stricture.  -Continue IV PPI.    Hyperglycemia  Recent HbA1c was 5.6 on 7/19/22. Was not on any medication prior to admission.  -Blood sugars fairly well controlled.  -Continue medium dose sliding scale insulin every 4 hours.     Hypokalemia  Hyponatremia  Hypophosphatemia  -Recheck and replace per protocol.     Severe protein calorie malnutrition  -Dietitian  consulted.  -Currently getting tube feedings and free water flushes via NJ feeding tube.  -Will need SLP consult when more alert and able to participate.     Physical deconditioning  -Patient seems severely deconditioned.  -PT/OT consulted.       Diet: Adult Formula Drip Feeding: Continuous Osmolite 1.5; Other - Specify in Comment; Goal Rate: 50; mL/hr; Medication - Feeding Tube Flush Frequency: At least 15-30 mL water before and after medication administration and with tube clogging; Amount to ...    DVT Prophylaxis: Pneumatic Compression Devices  Abrams Catheter: Not present  Central Lines: PRESENT  PICC Triple Lumen 07/22/22 Right Basilic Vascular access-Site Assessment: WDL  Cardiac Monitoring: ACTIVE order. Indication: ICU  Code Status: Full Code      Disposition Plan      Expected Discharge Date: 08/12/2022        Discharge Comments: Needs NG feeding tube placed        The patient's care was discussed with the Bedside Nurse and Patient.    Elton Nguyen MD  Hospitalist Service  [facility]  Securely message with the [employer] Web Console (learn more here)  Text page via [employer] Paging/Directory         Clinically Significant Risk Factors Present on Admission                      ______________________________________________________________________    Interval History   Julisa Chaney was seen today. She followed a few simple commands, but did not respond to my questions. Unable to get any history from her. Discussed with nursing.    Data reviewed today: I reviewed all medications, new labs and imaging results over the last 24 hours. I personally reviewed no images or EKG's today.    Physical Exam   Vital Signs: Temp: 98.8  F (37.1  C) Temp src: Oral BP: 121/49 Pulse: 108   Resp: 26 SpO2: 98 % O2 Device: None (Room air)    Weight: 120 lbs 5.94 oz  Constitutional: awake, alert, cooperative, no apparent distress, followed a few simple commands (squeeze my fingers), she did not speak  while I was in the room  HEENT: ventriculostomy drain in place  Respiratory: clear to auscultation bilaterally, no crackles or wheezing  Cardiovascular: regular rate and rhythm, normal S1 and S2, no murmur noted  GI: normal bowel sounds, soft, non-distended, non-tender, NJ tube in place, rectal tube in place  Skin: warm, dry  Musculoskeletal: no lower extremity pitting edema present  Neurologic: awake, alert, moves all extremities, follows some commands, did not speak while I was in the room    Data   Recent Labs   Lab 08/08/22 2002 08/08/22 2000 08/08/22  1629 08/08/22  1615 08/08/22  1124 08/08/22  0825 08/08/22  0610 08/07/22  1145 08/07/22  0740 08/07/22  0738 08/07/22  0612 08/06/22  0750 08/06/22  0705   WBC  --   --   --  9.9  --   --   --   --  15.0*  --  13.9*  --  25.0*   HGB  --  6.9*  --  6.4*  --   --   --   --  7.3*  --  6.6*  --  9.2*   MCV  --   --   --  73*  --   --   --   --  72*  --  71*  --  71*   PLT  --   --   --  378  --   --   --   --  416  --  380  --   --    NA  --   --   --   --   --   --  134  --   --   --  136  --  134   POTASSIUM  --   --   --   --   --   --  4.0  --   --   --  3.8  --  3.8   CHLORIDE  --   --   --   --   --   --  102  --   --   --  104  --  101   CO2  --   --   --   --   --   --  27  --   --   --  28  --  27   BUN  --   --   --   --   --   --  12  --   --   --  16  --  14   CR  --   --   --   --   --   --  0.36*  --   --   --  0.37*  --  0.42*   ANIONGAP  --   --   --   --   --   --  5  --   --   --  4  --  6   ADY  --   --   --   --   --   --  8.2*  --   --   --  8.0*  --  9.3   *  --  180*  --  131*   < > 139*   < >  --    < > 154*   < > 167*    < > = values in this interval not displayed.     Medications     - MEDICATION INSTRUCTIONS -         ceFEPIme (MAXIPIME) IV  2 g Intravenous Q8H     insulin aspart  1-6 Units Subcutaneous Q4H     levETIRAcetam  1,000 mg Intravenous Q12H     metroNIDAZOLE  500 mg Intravenous Q8H     multivitamins w/minerals  15 mL Per  Feeding Tube Daily     OXcarbazepine  450 mg Oral or Feeding Tube At Bedtime     pantoprazole  40 mg Oral or Feeding Tube QAM AC     sodium chloride (PF)  10-40 mL Intracatheter Q7 Days     topiramate  50 mg Oral or Feeding Tube At Bedtime     vancomycin  125 mg Oral or Feeding Tube 4x Daily     vancomycin  1,000 mg Intravenous Q12H

## 2022-08-08 NOTE — PROGRESS NOTES
CLINICAL NUTRITION SERVICES - REASSESSMENT NOTE      Malnutrition: (8/4)  % Weight Loss:  > 10% in 6 months (severe malnutrition)  % Intake:  Decreased intake does not meet criteria - EN meeting needs   Subcutaneous Fat Loss:  Orbital region moderate depletion  Muscle Loss:  Temporal region moderate depletion, Clavicle bone region moderate depletion, Acromion bone region moderate-severe depletion and Dorsal hand region severe depletion  Fluid Retention:  None noted     Malnutrition Diagnosis: Severe malnutrition  In Context of:  Acute illness or injury        EVALUATION OF PROGRESS TOWARD GOALS   Diet:  NPO     Nutrition Support:  Patient continues on goal TF regimen as follows ~    Nutrition Support Enteral:  Type of Feeding Tube: NG  Enteral Frequency:  Continuous  Enteral Regimen: Osmolite 1.5 at 50 mL/hr  Total Enteral Provisions: 1800 kcal (37 kcal/kg), 76 g protein (1.6 g/kg), 914 mL H2O, 245 g CHO, no fiber   Free Water Flush: 100 mL every 4 hours     Intake/Tolerance:    K and Mg normal   Phos 2.3 (L)   -203 with Medium sliding scale insulin   I/O 3790/2400, wt 54.6 kg   Stool 1000 mL yest, 300 mL on 8/6, and 500 mL on 8/5      ASSESSED NUTRITION NEEDS:  Dosing Weight 48.3 kg   Estimated Energy Needs: 5411-0200 kcals (35-40 Kcal/Kg)  Justification: repletion and underweight  Estimated Protein Needs: 70-95 grams protein (1.5-2 g pro/Kg)  Justification: repletion and hypercatabolism with critical illness  Estimated Fluid Needs: 9800-7630 mL (1 mL/Kcal)  Justification: maintenance      NEW FINDINGS:   8/5:  EVD clamped   8/7:  Head CT= 1.  Stable hydrocephalus in the left lateral ventricle.   2.  No significant change from prior.     Previous Goals (8/4):   TF will continue to meet % assessed nutrition needs  Evaluation: Met    Previous Nutrition Diagnosis (8/4):   No nutrition diagnosis at this time   Evaluation: No change      CURRENT NUTRITION DIAGNOSIS  No nutrition diagnosis identified at this  time     INTERVENTIONS  Recommendations / Nutrition Prescription  Continue goal TF regimen as above     Implementation  Collaboration and Referral of Nutrition care:  Patient discussed today during interdisciplinary bedside rounds     Goals  TF regimen will continue to meet % needs     MONITORING AND EVALUATION:  Progress towards goals will be monitored and evaluated per protocol and Practice Guidelines    Nubia Ruiz RD, LD, CNSC   Clinical Dietitian - [facility]

## 2022-08-08 NOTE — PLAN OF CARE
Goal Outcome Evaluation:     Patient disoriented x3. Follows simple commands. Head CT done and EVD removed this afternoon. Acetaminophen and oxycodone given for c/o head pain. Rectal tube and triple lumen PICC remains in place. Tube feeding is at goal of 65ml/hr.

## 2022-08-08 NOTE — PROGRESS NOTES
[facility]     Neurosurgery Progress Note     Date of Service (when I saw the patient): 08/07/2022        Assessment & Plan        Procedure(s):  LEFT FRONTAL VENTRICULOSTOMY   -8 Days Post-Op  History of severe sepsis 2/2 ventriculitis with abscess. EVD clamped 8/6/22.  On exam today she's awake with her eyes open.  Does not follow commands but able to hold both arms up in air when assisted.  Ventriculostomy site C/D/I.   Hemoglobin 6.6 8/7/22, 9.2 8/6/22, 8.6 8/5/22     Plan:  -Head CT this morning, if stable will plan to pull ventriculostomy.  -Continue supportive and symptomatic cares  -Pain control as needed  -Routine wound care  -Okay to get out of bed.  -PT/OT  -Discussed hgb with Hospitalist     ADDENDUM 3:30 PM  Head CT stable, ventriculostomy removed in usual sterile fashion with drain site stitched.    Discussed with Dr. Heredia.    Shazia Caban MPAS  [facility] Neurosurgery  [address]  [address] [place] [id number]    Tel [phone]  Pager [phone]

## 2022-08-08 NOTE — PROGRESS NOTES
[facility]    Infectious Disease Progress Note    Date of Service: 08/08/2022     Assessment:  Patient transferred 7/27 from [facility] for concern for brain abscess (recovered COVID) - MRI suggestive of ventriculitis with left lateral ventricle abscess. Has been on metronidaole/ceftriaxone/vancomycin, s/p left frontal ventriculostomy  CSF cxs have remained negative. Course complicated by development of C.diff colitis..     Recommendations  1.Continue IV Vancomycin, Cefepimen and Metronidazole as well as oral vancomycin for C.diff  2. Neurosurgical team managing ventriculostomy  3. Given negative CSF cxs, unable to de-escalate antibiotic therapy. Will need to treat for presumed brain abscess for 6 weeks with cuttent regimen      Nuzhat Hudson MD    Interval History   Opens eye, answers some questions but tearful and complains of pain, unable to elaborate further. Has remained afebrile, leukocytosis is improving. All cxs remain negative, CSF cell counts improving    Physical Exam   Temp: 98.8  F (37.1  C) Temp src: Oral BP: 121/49 Pulse: 110   Resp: 25 SpO2: 96 % O2 Device: None (Room air)    Vitals:    08/05/22 0500 08/06/22 0600 08/08/22 0500   Weight: 53.4 kg (117 lb 11.6 oz) 53 kg (116 lb 13.5 oz) 54.6 kg (120 lb 5.9 oz)     Vital Signs with Ranges  Temp:  [97.6  F (36.4  C)-98.8  F (37.1  C)] 98.8  F (37.1  C)  Pulse:  [] 110  Resp:  [16-32] 25  BP: ()/(48-97) 121/49  SpO2:  [94 %-100 %] 96 %    Constitutional: opens eyes  Neuro : L frontal ventriculostomy in place  Lungs: Clear to auscultation bilaterally  Cardiovascular: Regular rate and rhythm, normal S1 and S2, and no murmur noted  Abdomen: Normal bowel sounds, soft, non-distended, non-tender  Skin: No rashes, no cyanosis, no edema    Other:    Medications     - MEDICATION INSTRUCTIONS -         alteplase  2 mg Intravenous Q2H     alteplase  2 mg Intravenous Q2H     ceFEPIme (MAXIPIME) IV  2 g Intravenous Q8H     insulin  aspart  1-6 Units Subcutaneous Q4H     levETIRAcetam  1,000 mg Intravenous Q12H     metroNIDAZOLE  500 mg Intravenous Q8H     multivitamins w/minerals  15 mL Per Feeding Tube Daily     OXcarbazepine  450 mg Oral or Feeding Tube At Bedtime     pantoprazole  40 mg Oral or Feeding Tube QAM AC     sodium chloride (PF)  10-40 mL Intracatheter Q7 Days     sodium phosphate  9 mmol Intravenous Once     topiramate  50 mg Oral or Feeding Tube At Bedtime     vancomycin  125 mg Oral or Feeding Tube 4x Daily     vancomycin  1,000 mg Intravenous Q12H       Data   All microbiology laboratory data reviewed.  Recent Labs   Lab Test 08/07/22  0740 08/07/22  0612 08/06/22  0705 08/05/22  0558   WBC 15.0* 13.9* 25.0* 12.1*   HGB 7.3* 6.6* 9.2* 8.6*   HCT 25.5* 22.2* 31.5* 29.8*   MCV 72* 71* 71* 72*    380  --  352     Recent Labs   Lab Test 08/08/22  0610 08/07/22  0612 08/06/22  0705   CR 0.36* 0.37* 0.42*     Recent Labs   Lab Test 08/08/20  0212   SED 13       Imaging  8/8 Ct head  CT OF THE HEAD WITHOUT CONTRAST   8/8/2022 10:58 AM      COMPARISON: 8/7/2022.     HISTORY:  External ventricular drain.      TECHNIQUE: Axial CT images of the head from the skull base to the  vertex were acquired without IV contrast. Dose reduction techniques  were used.      FINDINGS: Image quality is degraded by motion.     INTRACRANIAL CONTENTS: Redemonstrated left frontal approach external  ventricular drain terminating within the body of the left lateral  ventricle. Asymmetric dilation of the left lateral ventricle similar  to the previous study. The ventricular system is otherwise  unremarkable. No acute hemorrhage or extra-axial collection. Vasogenic  edema within the left frontal white matter again noted. No mass  effect. Chronic infarctions within cerebellar hemispheres.     VISUALIZED ORBITS/SINUSES/MASTOIDS: No significant orbital  abnormality.  No significant paranasal sinus mucosal disease. No  significant middle ear or mastoid  effusion.     OSSEOUS STRUCTURES/SOFT TISSUES: No significant abnormality.                                                                      IMPRESSION:  1.  Unchanged left frontal approach ventriculostomy with stable  dilation of the left lateral ventricle.  2.  Unchanged vasogenic edema left frontal white matter.

## 2022-08-09 ENCOUNTER — APPOINTMENT (OUTPATIENT)
Dept: OCCUPATIONAL THERAPY | Facility: CLINIC | Age: 77
DRG: 023 | End: 2022-08-09
Attending: PHYSICIAN ASSISTANT
Payer: COMMERCIAL

## 2022-08-09 LAB
ALBUMIN SERPL-MCNC: 1.9 G/DL (ref 3.4–5)
ALP SERPL-CCNC: 72 U/L (ref 40–150)
ALT SERPL W P-5'-P-CCNC: 9 U/L (ref 0–50)
ANION GAP SERPL CALCULATED.3IONS-SCNC: 4 MMOL/L (ref 3–14)
AST SERPL W P-5'-P-CCNC: 6 U/L (ref 0–45)
BILIRUB SERPL-MCNC: 0.4 MG/DL (ref 0.2–1.3)
BUN SERPL-MCNC: 10 MG/DL (ref 7–30)
CALCIUM SERPL-MCNC: 8.6 MG/DL (ref 8.5–10.1)
CHLORIDE BLD-SCNC: 103 MMOL/L (ref 94–109)
CO2 SERPL-SCNC: 28 MMOL/L (ref 20–32)
CREAT SERPL-MCNC: 0.32 MG/DL (ref 0.52–1.04)
ERYTHROCYTE [DISTWIDTH] IN BLOOD BY AUTOMATED COUNT: 19.2 % (ref 10–15)
GFR SERPL CREATININE-BSD FRML MDRD: >90 ML/MIN/1.73M2
GLUCOSE BLD-MCNC: 181 MG/DL (ref 70–99)
GLUCOSE BLDC GLUCOMTR-MCNC: 147 MG/DL (ref 70–99)
GLUCOSE BLDC GLUCOMTR-MCNC: 156 MG/DL (ref 70–99)
GLUCOSE BLDC GLUCOMTR-MCNC: 160 MG/DL (ref 70–99)
GLUCOSE BLDC GLUCOMTR-MCNC: 183 MG/DL (ref 70–99)
GLUCOSE BLDC GLUCOMTR-MCNC: 227 MG/DL (ref 70–99)
GLUCOSE BLDC GLUCOMTR-MCNC: 228 MG/DL (ref 70–99)
HCT VFR BLD AUTO: 28.8 % (ref 35–47)
HGB BLD-MCNC: 8.4 G/DL (ref 11.7–15.7)
HGB BLD-MCNC: 8.5 G/DL (ref 11.7–15.7)
MCH RBC QN AUTO: 21.9 PG (ref 26.5–33)
MCHC RBC AUTO-ENTMCNC: 29.5 G/DL (ref 31.5–36.5)
MCV RBC AUTO: 74 FL (ref 78–100)
PHOSPHATE SERPL-MCNC: 2.2 MG/DL (ref 2.5–4.5)
PLATELET # BLD AUTO: 497 10E3/UL (ref 150–450)
POTASSIUM BLD-SCNC: 3.8 MMOL/L (ref 3.4–5.3)
PROT SERPL-MCNC: 5.8 G/DL (ref 6.8–8.8)
RBC # BLD AUTO: 3.88 10E6/UL (ref 3.8–5.2)
SODIUM SERPL-SCNC: 135 MMOL/L (ref 133–144)
WBC # BLD AUTO: 10.1 10E3/UL (ref 4–11)

## 2022-08-09 PROCEDURE — 99233 SBSQ HOSP IP/OBS HIGH 50: CPT | Performed by: SPECIALIST

## 2022-08-09 PROCEDURE — 85018 HEMOGLOBIN: CPT | Performed by: NURSE PRACTITIONER

## 2022-08-09 PROCEDURE — 999N000190 HC STATISTIC VAT ROUNDS

## 2022-08-09 PROCEDURE — 250N000011 HC RX IP 250 OP 636: Performed by: INTERNAL MEDICINE

## 2022-08-09 PROCEDURE — 250N000011 HC RX IP 250 OP 636: Performed by: SPECIALIST

## 2022-08-09 PROCEDURE — 250N000013 HC RX MED GY IP 250 OP 250 PS 637: Performed by: INTERNAL MEDICINE

## 2022-08-09 PROCEDURE — 85018 HEMOGLOBIN: CPT | Performed by: INTERNAL MEDICINE

## 2022-08-09 PROCEDURE — 97530 THERAPEUTIC ACTIVITIES: CPT | Mod: GO

## 2022-08-09 PROCEDURE — 250N000013 HC RX MED GY IP 250 OP 250 PS 637

## 2022-08-09 PROCEDURE — 84100 ASSAY OF PHOSPHORUS: CPT | Performed by: INTERNAL MEDICINE

## 2022-08-09 PROCEDURE — 36415 COLL VENOUS BLD VENIPUNCTURE: CPT | Performed by: SPECIALIST

## 2022-08-09 PROCEDURE — 99232 SBSQ HOSP IP/OBS MODERATE 35: CPT | Performed by: HOSPITALIST

## 2022-08-09 PROCEDURE — 87040 BLOOD CULTURE FOR BACTERIA: CPT | Performed by: SPECIALIST

## 2022-08-09 PROCEDURE — 200N000001 HC R&B ICU

## 2022-08-09 PROCEDURE — 97166 OT EVAL MOD COMPLEX 45 MIN: CPT | Mod: GO

## 2022-08-09 PROCEDURE — 80053 COMPREHEN METABOLIC PANEL: CPT | Performed by: HOSPITALIST

## 2022-08-09 RX ADMIN — POTASSIUM & SODIUM PHOSPHATES POWDER PACK 280-160-250 MG 1 PACKET: 280-160-250 PACK at 13:37

## 2022-08-09 RX ADMIN — MORPHINE SULFATE 2 MG: 4 INJECTION, SOLUTION INTRAMUSCULAR; INTRAVENOUS at 23:26

## 2022-08-09 RX ADMIN — VANCOMYCIN HYDROCHLORIDE 125 MG: KIT at 08:00

## 2022-08-09 RX ADMIN — INSULIN ASPART 1 UNITS: 100 INJECTION, SOLUTION INTRAVENOUS; SUBCUTANEOUS at 16:02

## 2022-08-09 RX ADMIN — ACETAMINOPHEN 650 MG: 325 SUSPENSION ORAL at 15:45

## 2022-08-09 RX ADMIN — POTASSIUM & SODIUM PHOSPHATES POWDER PACK 280-160-250 MG 1 PACKET: 280-160-250 PACK at 10:13

## 2022-08-09 RX ADMIN — VANCOMYCIN HYDROCHLORIDE 125 MG: KIT at 17:57

## 2022-08-09 RX ADMIN — LEVETIRACETAM 1000 MG: 10 INJECTION INTRAVENOUS at 22:53

## 2022-08-09 RX ADMIN — VANCOMYCIN HYDROCHLORIDE 1000 MG: 1 INJECTION, SOLUTION INTRAVENOUS at 08:10

## 2022-08-09 RX ADMIN — INSULIN ASPART 2 UNITS: 100 INJECTION, SOLUTION INTRAVENOUS; SUBCUTANEOUS at 21:37

## 2022-08-09 RX ADMIN — Medication 15 ML: at 08:00

## 2022-08-09 RX ADMIN — CEFEPIME HYDROCHLORIDE 2 G: 2 INJECTION, POWDER, FOR SOLUTION INTRAVENOUS at 23:15

## 2022-08-09 RX ADMIN — Medication 40 MG: at 08:00

## 2022-08-09 RX ADMIN — VANCOMYCIN HYDROCHLORIDE 125 MG: KIT at 12:33

## 2022-08-09 RX ADMIN — OXYCODONE HYDROCHLORIDE 5 MG: 5 TABLET ORAL at 12:33

## 2022-08-09 RX ADMIN — POTASSIUM & SODIUM PHOSPHATES POWDER PACK 280-160-250 MG 1 PACKET: 280-160-250 PACK at 05:39

## 2022-08-09 RX ADMIN — INSULIN ASPART 1 UNITS: 100 INJECTION, SOLUTION INTRAVENOUS; SUBCUTANEOUS at 04:20

## 2022-08-09 RX ADMIN — CEFEPIME HYDROCHLORIDE 2 G: 2 INJECTION, POWDER, FOR SOLUTION INTRAVENOUS at 12:31

## 2022-08-09 RX ADMIN — INSULIN ASPART 1 UNITS: 100 INJECTION, SOLUTION INTRAVENOUS; SUBCUTANEOUS at 08:00

## 2022-08-09 RX ADMIN — MORPHINE SULFATE 2 MG: 4 INJECTION, SOLUTION INTRAMUSCULAR; INTRAVENOUS at 04:41

## 2022-08-09 RX ADMIN — METRONIDAZOLE 500 MG: 500 INJECTION, SOLUTION INTRAVENOUS at 08:00

## 2022-08-09 RX ADMIN — VANCOMYCIN HYDROCHLORIDE 125 MG: KIT at 23:16

## 2022-08-09 RX ADMIN — VANCOMYCIN HYDROCHLORIDE 1000 MG: 1 INJECTION, SOLUTION INTRAVENOUS at 21:39

## 2022-08-09 RX ADMIN — LEVETIRACETAM 1000 MG: 10 INJECTION INTRAVENOUS at 10:13

## 2022-08-09 RX ADMIN — INSULIN ASPART 2 UNITS: 100 INJECTION, SOLUTION INTRAVENOUS; SUBCUTANEOUS at 12:15

## 2022-08-09 RX ADMIN — CEFEPIME HYDROCHLORIDE 2 G: 2 INJECTION, POWDER, FOR SOLUTION INTRAVENOUS at 04:30

## 2022-08-09 RX ADMIN — ACETAMINOPHEN 650 MG: 325 SUSPENSION ORAL at 22:56

## 2022-08-09 RX ADMIN — OXCARBAZEPINE 450 MG: 300 SUSPENSION ORAL at 22:53

## 2022-08-09 RX ADMIN — METRONIDAZOLE 500 MG: 500 INJECTION, SOLUTION INTRAVENOUS at 15:46

## 2022-08-09 RX ADMIN — TOPIRAMATE 50 MG: 50 TABLET ORAL at 22:56

## 2022-08-09 ASSESSMENT — ACTIVITIES OF DAILY LIVING (ADL)
ADLS_ACUITY_SCORE: 40
ADLS_ACUITY_SCORE: 44
ADLS_ACUITY_SCORE: 40
ADLS_ACUITY_SCORE: 44
ADLS_ACUITY_SCORE: 40
ADLS_ACUITY_SCORE: 40
ADLS_ACUITY_SCORE: 44
ADLS_ACUITY_SCORE: 44

## 2022-08-09 NOTE — PROGRESS NOTES
[facility]    Medicine Progress Note - Hospitalist Service    Date of Admission:  7/27/2022    Assessment & Plan        Julisa Chaney is a 77-year-old woman with medical history which includes trigeminal neuralgia who was recently admitted to [facility] on 7/19 for severe sepsis(had leukocytosis, lactic acidosis and elevated procalcitonin)   due to suspected COVID-19 infection and suspected bacterial pneumonia. recent COVID-19 infection which was diagnosed on 7/19/2022 and was treated with 5 days of remdesivir and did not require any steroids and she was treated with IV vancomycin and IV meropenem.     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  She was switched to IV vancomycin, cefepime and metronidazole and transferred to [facility] for neurosurgery assessment. MRI brain w/wo contrast 7/30 showed ventriculitis with probable abscess. Neurosurgery/neurology/infectious disease/oncology consulted. Patient taken to the OR on 7/31 for left frontal ventriculostomy.      Severe sepsis secondary to ventriculitis with abscess-improving  Status left frontal ventriculostomy 7/31/22  -Imaging as noted above.  -Neurosurgery and oncology were consulted and flow cytometry was done which did not show any evidence of malignancy.  -Patient underwent left frontal ventriculostomy on 7/31/2022. Post-op management per neurosurgery team.  -CSF sent on 8/5 which revealed , , glucose 98 and protein 84. WBC this AM 25, . CSF 2cc for repeat labs. CSF with sediment.   -EVD was clamped on 8/6 and removed on 8/8.  -ID following.  -Currently on IV vancomycin, cefepime, and metronidazole. ID planning 6 weeks of treatment with  current regimen - unable to de-escalate as cultures have been negative.  -Transfer out of ICU to neuro unit now the EVD has been removed.  -Had a fever on 8/9/22. Received PRBC transfusion the previous evening, but this was completed about 10 hours prior to the fever. Blood cultures ordered by ID. Consider repeat imaging if recurrent fevers.  -Repeat labs in AM.      Acute infectious encephalopathy due to above  Appeared to be improving for a few days, was starting to talk a little, but has not been verbal while I have been in the room the past two days.  -Continue to treat underlying issues as noted above.  -Reorient as needed.  -Maintain normal sleep/wake cycle as able.  -Avoid opioid pain medications as able.     Sinus tachycardia  -Treat underlying issues as noted.  -Continue to monitor.     C. difficile colitis  Patient was noted to have copious diarrhea on 8/1 and sample came back positive for C. Difficile.  -Continue oral vancomycin.  -Rectal tube still in place, liquid stool output appears to be improving.  -ID following.    Acute on chronic anemia  Prior labs show baseline hemoglobin of about 9-10. Hemoglobin on admission was 11.7. Has slowly trended down, now low enough to recommend PRBC transfusion on 8/8/22. No obvious ongoing bleeding noted. WBC and platelets - normal-elevated. Iron panel on 7/30/22 showed that her total iron was low at 14, binding capacity was low at 28, iron saturation was 6 and B12 was high and folate was normal.  -Repeat hemoglobin 8/8 lower at 6.4, then 6.9 on recheck. Transfused one unit PRBCs on 8/8/22.  -Hemoglobin improved to 8.5 on 8/9.  -She is iron deficient, consider IV iron transfusion, held off today due to fever and plan for blood cultures.  -If hemoglobin trends down again, will ask hematology to evaluate.    History of trigeminal neuralgia  -Continue PTA Trileptal and Topamax.     History of chronic pyloric ulcer  She had EGD done in 2020 which showed gastroduodenitis  with a duodenal stricture.  -Continue IV PPI.    Hyperglycemia  Recent HbA1c was 5.6 on 7/19/22. Was not on any medication prior to admission.  -Blood sugars fairly well controlled.  -Continue medium dose sliding scale insulin every 4 hours.     Hypokalemia  Hyponatremia  Hypophosphatemia  -Recheck and replace per protocol.     Severe protein calorie malnutrition  -Dietitian consulted.  -Currently getting tube feedings and free water flushes via NG feeding tube.  -Will need SLP consult when more alert and able to participate.     Physical deconditioning  -Patient seems severely deconditioned.  -PT/OT consulted, although not really able to participate at this point due to her mental status.       Diet: Adult Formula Drip Feeding: Continuous Osmolite 1.5; Other - Specify in Comment; Goal Rate: 50; mL/hr; Medication - Feeding Tube Flush Frequency: At least 15-30 mL water before and after medication administration and with tube clogging; Amount to ...    DVT Prophylaxis: Pneumatic Compression Devices  Abrams Catheter: Not present  Central Lines: PRESENT  PICC Triple Lumen 07/22/22 Right Basilic Vascular access-Site Assessment: WDL  Cardiac Monitoring: ACTIVE order. Indication: ICU  Code Status: Full Code      Disposition Plan     Expected Discharge Date: 08/12/2022        Discharge Comments: Needs NG feeding tube placed        The patient's care was discussed with the Bedside Nurse, Patient, Patient's Family and ID Consultant.    Elton Nguyen MD  Hospitalist Service  [facility]  Securely message with the Vocera Web Console (learn more here)  Text page via [employer] Paging/Directory         Clinically Significant Risk Factors Present on Admission                      ______________________________________________________________________    Interval History   Julisa SILVEIRA Ritu was seen today. She was laying in the ICU bed. Awake, but does not respond to questions or reliably follow commands.  Discussed with nursing. Discussed with ID. Updated her daughter by phone this afternoon.    Data reviewed today: I reviewed all medications, new labs and imaging results over the last 24 hours. I personally reviewed no images or EKG's today.    Physical Exam   Vital Signs: Temp: (!) 101.4  F (38.6  C) Temp src: Axillary BP: 122/73 Pulse: 109   Resp: 13 SpO2: 96 % O2 Device: None (Room air)    Weight: 112 lbs 10.48 oz  Constitutional: awake, alert, no apparent distress, does not reliably follow commands, no verbal response to my questions  Respiratory: clear to auscultation bilaterally, no crackles or wheezing  Cardiovascular: regular rate and rhythm, normal S1 and S2, no murmur noted  GI: normal bowel sounds, soft, non-distended, non-tender, NG feeding tube in place, rectal tube in place  Skin: warm, dry  Musculoskeletal: no lower extremity pitting edema present  Neurologic: awake, alert, she did not speak while I was in the room today, moves her extremities but does not reliably follow commands    Data   Recent Labs   Lab 08/09/22  1214 08/09/22  0757 08/09/22  0420 08/09/22  0418 08/09/22  0251 08/09/22  0247 08/08/22  2002 08/08/22  2000 08/08/22  1629 08/08/22  1615 08/08/22  0825 08/08/22  0610 08/07/22  1145 08/07/22  0740 08/07/22  0738 08/07/22  0612   WBC  --   --   --  10.1  --   --   --   --   --  9.9  --   --   --  15.0*  --  13.9*   HGB  --   --   --  8.5*  --  8.4*  --  6.9*  --  6.4*  --   --   --  7.3*  --  6.6*   MCV  --   --   --  74*  --   --   --   --   --  73*  --   --   --  72*  --  71*   PLT  --   --   --  497*  --   --   --   --   --  378  --   --   --  416  --  380   NA  --   --   --  135  --   --   --   --   --   --   --  134  --   --   --  136   POTASSIUM  --   --   --  3.8  --   --   --   --   --   --   --  4.0  --   --   --  3.8   CHLORIDE  --   --   --  103  --   --   --   --   --   --   --  102  --   --   --  104   CO2  --   --   --  28  --   --   --   --   --   --   --  27  --   --    --  28   BUN  --   --   --  10  --   --   --   --   --   --   --  12  --   --   --  16   CR  --   --   --  0.32*  --   --   --   --   --   --   --  0.36*  --   --   --  0.37*   ANIONGAP  --   --   --  4  --   --   --   --   --   --   --  5  --   --   --  4   ADY  --   --   --  8.6  --   --   --   --   --   --   --  8.2*  --   --   --  8.0*   * 156* 160* 181*   < >  --    < >  --    < >  --    < > 139*   < >  --    < > 154*   ALBUMIN  --   --   --  1.9*  --   --   --   --   --   --   --   --   --   --   --   --    PROTTOTAL  --   --   --  5.8*  --   --   --   --   --   --   --   --   --   --   --   --    BILITOTAL  --   --   --  0.4  --   --   --   --   --   --   --   --   --   --   --   --    ALKPHOS  --   --   --  72  --   --   --   --   --   --   --   --   --   --   --   --    ALT  --   --   --  9  --   --   --   --   --   --   --   --   --   --   --   --    AST  --   --   --  6  --   --   --   --   --   --   --   --   --   --   --   --     < > = values in this interval not displayed.     Medications     - MEDICATION INSTRUCTIONS -         ceFEPIme (MAXIPIME) IV  2 g Intravenous Q8H     insulin aspart  1-6 Units Subcutaneous Q4H     levETIRAcetam  1,000 mg Intravenous Q12H     metroNIDAZOLE  500 mg Intravenous Q8H     multivitamins w/minerals  15 mL Per Feeding Tube Daily     OXcarbazepine  450 mg Oral or Feeding Tube At Bedtime     pantoprazole  40 mg Oral or Feeding Tube QAM AC     sodium chloride (PF)  10-40 mL Intracatheter Q7 Days     topiramate  50 mg Oral or Feeding Tube At Bedtime     vancomycin  125 mg Oral or Feeding Tube 4x Daily     vancomycin  1,000 mg Intravenous Q12H

## 2022-08-09 NOTE — PROGRESS NOTES
[facility]    Medicine Progress Note - Hospitalist Service       Date of Admission:  7/27/2022    Summary:   Assessment & Plan:   #Acute on chronic anemia, likely multifactorial  #Iron deficiency anemia and anemia of chronic disease  # Acute infectious encephalopathy   # Severe sepsis secondary to ventriculitis with abscess s/p Left frontal ventriculostomy 7/31    Has baseline Hgb of 9's but was new low of 6.6 on 8/7 and today 6.4 this afternoon.  Repeat Hgb 6.9.  A few days ago workup did show REBEL.  Per RN, no overt s/s of bleeding.  I did speak with Neurology this evening, they have pulled her EVD this morning, they report no excessive blood in drain; repeat CT unchanged noting No acute hemorrhage or extra-axial collection.  Neuro reports no concern for brain bleed.  Thus etiology though to be REBEL and chronic disease and likely just not rebuilding recent losses but unclear.     Per Neuro, no contraindications for transfusion.   -I called POA/daughter Alissa Del Real and discussed lab's, current status, and recommendation for transfusion, she concurred.   -We also reviewed transfusion consent form and discussed risks/benefits and filled out consent form with RN assisting.    -Will transfuse 1 unit of RBC's this evening.   -Repeat CBC in AM.   --Will leave further iron supplementation and further workup to the day teams discretion.     Signed:     DANIELLE Gasca [facility]  Hospitalist Service  [facility]  Securely message with the Vocera Web Console (learn more here)  Text page via "[email or web address] Paging/Directory       Interval History   RN called to report ongoing declining Hgb, VSS, only AOx1 to self, but unchanged.      Review of Systems:  Unobtainable secondary to cognitive impairment.     Objective Data:   Vital Signs: Temp: 98.2  F (36.8  C) Temp src: Oral BP: 112/56 Pulse: 107   Resp: 26 SpO2: 96 % O2 Device: None (Room air)    Weight: 120 lbs 5.94 oz    Physical  Exam:  Vital signs reviewed:    Constitutional:     General: NAD, moderately somnolent      Appearance: Fatigued and fragile/elderly  Non-toxic.   Pulmonary:      Effort: Regular relaxed effort.  No cough.   Extremities:     Findings: Warm and well perfused, no calf tenderness.      Bilateral leg's: No lower leg edema.   Skin:      General: Skin is warm/dry.      Findings: No rash or wounds seen on exposed skin.   Neurological:     General: Mostly somnolent, would wake up with gentle voice/touch but would go back to sleep.  Would not answer questions.     Data reviewed today: I reviewed all medications, new labs and imaging results over the last 24 hours.    End:

## 2022-08-09 NOTE — PLAN OF CARE
Goal Outcome Evaluation:  Pt only verbalizing a word or two otherwise remains nonverbal. Pt follows commands intermittently. Up to the chair for an hour. Good urine output. Transfer orders to 73 - awaiting a bed. Cont to monitor.

## 2022-08-09 NOTE — PROGRESS NOTES
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 Has been on metronidaole/ceftriaxone/vancomycin, s/p left frontal ventriculostomy  CSF cxs have remained negative. Course complicated by development of C.diff colitis..     Recommendations  1. Repeat blood cxs X 2 sets  2. Consider repeating MRI of the brain W/WO contrast to reassess  3. Maintain on Vancomycin, Cefepime, Metronidazole IV and oral vancomycin for C.diff  Goals of care discussion with family  Discussed with the Hospitalist service    Nuzhat Hudson MD    Interval History   Less responsive today, ventriculostomy was removed, had a fever of 101.4 this morning despite broad antibiotic coverage    Physical Exam   Temp: (!) 101.4  F (38.6  C) Temp src: Axillary BP: 136/64 Pulse: 115   Resp: (!) 31 SpO2: 98 % O2 Device: None (Room air)    Vitals:    08/06/22 0600 08/08/22 0500 08/09/22 0400   Weight: 53 kg (116 lb 13.5 oz) 54.6 kg (120 lb 5.9 oz) 51.1 kg (112 lb 10.5 oz)     Vital Signs with Ranges  Temp:  [98.2  F (36.8  C)-101.4  F (38.6  C)] 101.4  F (38.6  C)  Pulse:  [] 115  Resp:  [12-33] 31  BP: (100-136)/(53-79) 136/64  SpO2:  [95 %-100 %] 98 %    Constitutional: does open eyes but micheline not follow other verbal commands  Lungs: Clear to auscultation bilaterally, no crackles or wheezing  Cardiovascular: S1S2  Abdomen: soft  Skin: No rash    Other:    Medications     - MEDICATION INSTRUCTIONS -         ceFEPIme (MAXIPIME) IV  2 g Intravenous Q8H     insulin aspart  1-6 Units Subcutaneous Q4H     levETIRAcetam  1,000 mg Intravenous Q12H     metroNIDAZOLE  500 mg Intravenous Q8H     multivitamins w/minerals  15 mL Per Feeding Tube Daily     OXcarbazepine  450 mg Oral or Feeding Tube At Bedtime     pantoprazole  40 mg Oral or  Feeding Tube QAM AC     potassium & sodium phosphates  1 packet Oral or Feeding Tube Q4H     sodium chloride (PF)  10-40 mL Intracatheter Q7 Days     topiramate  50 mg Oral or Feeding Tube At Bedtime     vancomycin  125 mg Oral or Feeding Tube 4x Daily     vancomycin  1,000 mg Intravenous Q12H       Data   All microbiology laboratory data reviewed.  Recent Labs   Lab Test 08/09/22 0418 08/09/22  0247 08/08/22  2000 08/08/22  1615 08/07/22  0740   WBC 10.1  --   --  9.9 15.0*   HGB 8.5* 8.4* 6.9* 6.4* 7.3*   HCT 28.8*  --   --  22.8* 25.5*   MCV 74*  --   --  73* 72*   *  --   --  378 416     Recent Labs   Lab Test 08/09/22 0418 08/08/22  0610 08/07/22  0612   CR 0.32* 0.36* 0.37*     Recent Labs   Lab Test 08/08/20  0212   SED 13     Imaging  8/8 Ct head  CT OF THE HEAD WITHOUT CONTRAST   8/8/2022 10:58 AM      COMPARISON: 8/7/2022.     HISTORY:  External ventricular drain.      TECHNIQUE: Axial CT images of the head from the skull base to the  vertex were acquired without IV contrast. Dose reduction techniques  were used.      FINDINGS: Image quality is degraded by motion.     INTRACRANIAL CONTENTS: Redemonstrated left frontal approach external  ventricular drain terminating within the body of the left lateral  ventricle. Asymmetric dilation of the left lateral ventricle similar  to the previous study. The ventricular system is otherwise  unremarkable. No acute hemorrhage or extra-axial collection. Vasogenic  edema within the left frontal white matter again noted. No mass  effect. Chronic infarctions within cerebellar hemispheres.     VISUALIZED ORBITS/SINUSES/MASTOIDS: No significant orbital  abnormality.  No significant paranasal sinus mucosal disease. No  significant middle ear or mastoid effusion.     OSSEOUS STRUCTURES/SOFT TISSUES: No significant abnormality.                                                                      IMPRESSION:  1.  Unchanged left frontal approach ventriculostomy with  stable  dilation of the left lateral ventricle.  2.  Unchanged vasogenic edema left frontal white matter

## 2022-08-09 NOTE — PROGRESS NOTES
[facility]    Neurosurgery  Daily Post-Op Note    Assessment & Plan   Procedure(s):  LEFT FRONTAL VENTRICULOSTOMY   9 Days Post-Op  EVD out yesterday.   Sitting up automatic chair  Weak grasp to command    Plan:  -Advance activity as tolerated  -Continue supportive and symptomatic treatment  -defer to ID and Medicine for now    Chucho Balderas PA-C    Interval History   Stable.  Doing well.  Improving slowly.  Pain is reasonably controlled.  No fevers.     Physical Exam   Temp: (!) 101.4  F (38.6  C) Temp src: Axillary BP: 122/73 Pulse: 109   Resp: 13 SpO2: 96 % O2 Device: None (Room air)    Vitals:    08/06/22 0600 08/08/22 0500 08/09/22 0400   Weight: 53 kg (116 lb 13.5 oz) 54.6 kg (120 lb 5.9 oz) 51.1 kg (112 lb 10.5 oz)     Vital Signs with Ranges  Temp:  [98.2  F (36.8  C)-101.4  F (38.6  C)] 101.4  F (38.6  C)  Pulse:  [] 109  Resp:  [12-33] 13  BP: (100-136)/(53-79) 122/73  SpO2:  [95 %-100 %] 96 %  I/O last 3 completed shifts:  In: 3517.5 [I.V.:1250; NG/GT:690]  Out: 4500 [Urine:3800; Stool:700]    Alerts to voice, intermittently follows simple commands    Incision: CDI      Medications     - MEDICATION INSTRUCTIONS -          ceFEPIme (MAXIPIME) IV  2 g Intravenous Q8H     insulin aspart  1-6 Units Subcutaneous Q4H     levETIRAcetam  1,000 mg Intravenous Q12H     metroNIDAZOLE  500 mg Intravenous Q8H     multivitamins w/minerals  15 mL Per Feeding Tube Daily     OXcarbazepine  450 mg Oral or Feeding Tube At Bedtime     pantoprazole  40 mg Oral or Feeding Tube QAM AC     potassium & sodium phosphates  1 packet Oral or Feeding Tube Q4H     sodium chloride (PF)  10-40 mL Intracatheter Q7 Days     topiramate  50 mg Oral or Feeding Tube At Bedtime     vancomycin  125 mg Oral or Feeding Tube 4x Daily     vancomycin  1,000 mg Intravenous Q12H           Chucho Balderas PA-C  [facility] Neurosurgery  [facility]  [address]  [address] [place] [address]  [id number]    Tel [phone]  Pager [phone]

## 2022-08-09 NOTE — PLAN OF CARE
Pt alert to herself, follows simple commands.JANN, Moves all extremities.Remains on room air )2 sats %.Rectal Tube in place, Barrier cream applied around rectal tube for skin protection.TF at 50 ml/hr at the goal.Adequate UOP via Purewick.Morphine given x2 for generalized pain, patient was moaning with any touch, with some relief.Phosphorus replaced per protocol, 1 URBC given for Hgb 6.9 with the recheck 8.5.Blood transfusion consent obtained via phone with Daughter.

## 2022-08-09 NOTE — PROGRESS NOTES
"   08/09/22 0900   Quick Adds   Type of Visit Initial Occupational Therapy Evaluation   Living Environment   People in Home grandchild(geetha)  (grandson)   Current Living Arrangements house   Living Environment Comments Per PT eval 7/25, pt unable to answer hx questions during eval.   Self-Care   Activity/Exercise/Self-Care Comment Unable to provide history or PLOF information. Patient non-verbal   General Information   Onset of Illness/Injury or Date of Surgery 07/31/22   Referring Physician Shazia Caban PA-C   Additional Occupational Profile Info/Pertinent History of Current Problem 77-year-old female with trigeminal neuralgia who presented to [facility] on 7/19 due to severe sepsis.  She was found to have COVID-19 with superimposed bacterial pneumonia. Her hospitalization was complicated by severe metabolic encephalopathy.  Head CT on 7/25 showed possible left frontal mass causing vasogenic edema.  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  She was transferred to [facility] for neurosurgery assessment. s/p Left frontal external ventricular drain placement 7/31. Ventriculostomy removed 8/8.   Existing Precautions/Restrictions fall  (fexiseal)   Limitations/Impairments safety/cognitive   Cognitive Status Examination   Orientation Status not oriented to person, place or time   Follows Commands follows one-step commands;0-24% accuracy   Cognitive Status Comments Only verbalization made was \"ouch.\" Patient unable to respond to yes/no questions, unable to shake head. Able to squeeze hands when prompted.   Visual Perception   Visual Impairment/Limitations unable/difficult to assess   Range of Motion Comprehensive   General Range of Motion no range of motion deficits identified   Strength Comprehensive (MMT)   General Manual Muscle Testing (MMT) Assessment upper extremity strength deficits identified   Comment, General Manual " Muscle Testing (MMT) Assessment B UE weakness 3/5   Bed Mobility   Bed Mobility rolling left;rolling right   Rolling Left Marshall (Bed Mobility) dependent (less than 25% patient effort)   Rolling Right Marshall (Bed Mobility) dependent (less than 25% patient effort)   Transfers   Transfers bed-chair transfer   Transfer Skill: Bed to Chair/Chair to Bed   Bed-Chair Marshall (Transfers) dependent (less than 25% patient effort)   Transfer Comments total body lift from bed to combolizer   Grooming Assessment/Training   Marshall Level (Grooming) dependent (less than 25% patient effort)   Clinical Impression   Criteria for Skilled Therapeutic Interventions Met (OT) Yes, treatment indicated   OT Diagnosis below baseline with ADL and mobility   OT Problem List-Impairments impacting ADL activity tolerance impaired;cognition;communication;mobility;strength   Assessment of Occupational Performance 5 or more Performance Deficits   Identified Performance Deficits bathing. dressing, toileting, feeding, grooming, mobility   Planned Therapy Interventions (OT) ADL retraining;IADL retraining;cognition;strengthening;transfer training   Clinical Decision Making Complexity (OT) moderate complexity   Risk & Benefits of therapy have been explained evaluation/treatment results reviewed;care plan/treatment goals reviewed;risks/benefits reviewed;current/potential barriers reviewed;participants voiced agreement with care plan;participants included;patient   OT Discharge Planning   OT Discharge Recommendation (DC Rec) Transitional Care Facility   OT Rationale for DC Rec Patient requiring assist x 2 for all mobility and self cares due to impaired cognition, stength, and activity tolerance. She will continue to benefit from skilled OT to improve indpendence with ADL tasks.   Total Evaluation Time (Minutes)   Total Evaluation Time (Minutes) 8   OT Goals   Therapy Frequency (OT) 3 times/wk   OT Goals Hygiene/Grooming;Upper Body  Dressing;Transfers   OT: Hygiene/Grooming supervision/stand-by assist   OT: Upper Body Dressing Supervision/stand-by assist   OT: Transfer Minimal assist;with assistive device

## 2022-08-10 LAB
ANION GAP SERPL CALCULATED.3IONS-SCNC: 6 MMOL/L (ref 3–14)
BACTERIA BLD CULT: NO GROWTH
BACTERIA BLD CULT: NO GROWTH
BACTERIA CSF CULT: NO GROWTH
BUN SERPL-MCNC: 12 MG/DL (ref 7–30)
CALCIUM SERPL-MCNC: 8.7 MG/DL (ref 8.5–10.1)
CHLORIDE BLD-SCNC: 101 MMOL/L (ref 94–109)
CO2 SERPL-SCNC: 28 MMOL/L (ref 20–32)
CREAT SERPL-MCNC: 0.32 MG/DL (ref 0.52–1.04)
ERYTHROCYTE [DISTWIDTH] IN BLOOD BY AUTOMATED COUNT: 19.5 % (ref 10–15)
GFR SERPL CREATININE-BSD FRML MDRD: >90 ML/MIN/1.73M2
GLUCOSE BLD-MCNC: 243 MG/DL (ref 70–99)
GLUCOSE BLDC GLUCOMTR-MCNC: 181 MG/DL (ref 70–99)
GLUCOSE BLDC GLUCOMTR-MCNC: 188 MG/DL (ref 70–99)
GLUCOSE BLDC GLUCOMTR-MCNC: 188 MG/DL (ref 70–99)
GLUCOSE BLDC GLUCOMTR-MCNC: 205 MG/DL (ref 70–99)
GLUCOSE BLDC GLUCOMTR-MCNC: 229 MG/DL (ref 70–99)
GLUCOSE BLDC GLUCOMTR-MCNC: 233 MG/DL (ref 70–99)
GLUCOSE BLDC GLUCOMTR-MCNC: 257 MG/DL (ref 70–99)
GRAM STAIN RESULT: NORMAL
GRAM STAIN RESULT: NORMAL
HCT VFR BLD AUTO: 29.1 % (ref 35–47)
HGB BLD-MCNC: 8.6 G/DL (ref 11.7–15.7)
MCH RBC QN AUTO: 22.1 PG (ref 26.5–33)
MCHC RBC AUTO-ENTMCNC: 29.6 G/DL (ref 31.5–36.5)
MCV RBC AUTO: 75 FL (ref 78–100)
PHOSPHATE SERPL-MCNC: 2.4 MG/DL (ref 2.5–4.5)
PLATELET # BLD AUTO: 491 10E3/UL (ref 150–450)
POTASSIUM BLD-SCNC: 3.8 MMOL/L (ref 3.4–5.3)
RBC # BLD AUTO: 3.9 10E6/UL (ref 3.8–5.2)
SODIUM SERPL-SCNC: 135 MMOL/L (ref 133–144)
WBC # BLD AUTO: 9.8 10E3/UL (ref 4–11)

## 2022-08-10 PROCEDURE — 250N000013 HC RX MED GY IP 250 OP 250 PS 637: Performed by: INTERNAL MEDICINE

## 2022-08-10 PROCEDURE — 99233 SBSQ HOSP IP/OBS HIGH 50: CPT | Performed by: INTERNAL MEDICINE

## 2022-08-10 PROCEDURE — 93010 ELECTROCARDIOGRAM REPORT: CPT | Performed by: INTERNAL MEDICINE

## 2022-08-10 PROCEDURE — 250N000011 HC RX IP 250 OP 636: Performed by: HOSPITALIST

## 2022-08-10 PROCEDURE — 93005 ELECTROCARDIOGRAM TRACING: CPT

## 2022-08-10 PROCEDURE — 250N000011 HC RX IP 250 OP 636: Performed by: INTERNAL MEDICINE

## 2022-08-10 PROCEDURE — 250N000011 HC RX IP 250 OP 636: Performed by: SPECIALIST

## 2022-08-10 PROCEDURE — 999N000190 HC STATISTIC VAT ROUNDS

## 2022-08-10 PROCEDURE — 99233 SBSQ HOSP IP/OBS HIGH 50: CPT | Performed by: SPECIALIST

## 2022-08-10 PROCEDURE — 84100 ASSAY OF PHOSPHORUS: CPT | Performed by: INTERNAL MEDICINE

## 2022-08-10 PROCEDURE — 200N000001 HC R&B ICU

## 2022-08-10 PROCEDURE — 250N000013 HC RX MED GY IP 250 OP 250 PS 637: Performed by: HOSPITALIST

## 2022-08-10 PROCEDURE — 99232 SBSQ HOSP IP/OBS MODERATE 35: CPT | Performed by: HOSPITALIST

## 2022-08-10 PROCEDURE — 250N000012 HC RX MED GY IP 250 OP 636 PS 637: Performed by: HOSPITALIST

## 2022-08-10 PROCEDURE — 250N000013 HC RX MED GY IP 250 OP 250 PS 637

## 2022-08-10 PROCEDURE — 85027 COMPLETE CBC AUTOMATED: CPT | Performed by: HOSPITALIST

## 2022-08-10 PROCEDURE — 82374 ASSAY BLOOD CARBON DIOXIDE: CPT | Performed by: HOSPITALIST

## 2022-08-10 RX ADMIN — METRONIDAZOLE 500 MG: 500 INJECTION, SOLUTION INTRAVENOUS at 00:21

## 2022-08-10 RX ADMIN — INSULIN GLARGINE 5 UNITS: 100 INJECTION, SOLUTION SUBCUTANEOUS at 22:26

## 2022-08-10 RX ADMIN — VANCOMYCIN HYDROCHLORIDE 125 MG: KIT at 22:26

## 2022-08-10 RX ADMIN — VANCOMYCIN HYDROCHLORIDE 125 MG: KIT at 18:19

## 2022-08-10 RX ADMIN — MORPHINE SULFATE 2 MG: 4 INJECTION, SOLUTION INTRAMUSCULAR; INTRAVENOUS at 22:38

## 2022-08-10 RX ADMIN — CEFEPIME HYDROCHLORIDE 2 G: 2 INJECTION, POWDER, FOR SOLUTION INTRAVENOUS at 15:59

## 2022-08-10 RX ADMIN — POTASSIUM & SODIUM PHOSPHATES POWDER PACK 280-160-250 MG 1 PACKET: 280-160-250 PACK at 12:46

## 2022-08-10 RX ADMIN — METRONIDAZOLE 500 MG: 500 INJECTION, SOLUTION INTRAVENOUS at 23:33

## 2022-08-10 RX ADMIN — OXYCODONE HYDROCHLORIDE 5 MG: 5 TABLET ORAL at 16:25

## 2022-08-10 RX ADMIN — INSULIN ASPART 3 UNITS: 100 INJECTION, SOLUTION INTRAVENOUS; SUBCUTANEOUS at 15:40

## 2022-08-10 RX ADMIN — TOPIRAMATE 50 MG: 50 TABLET ORAL at 22:38

## 2022-08-10 RX ADMIN — MORPHINE SULFATE 2 MG: 4 INJECTION, SOLUTION INTRAMUSCULAR; INTRAVENOUS at 04:08

## 2022-08-10 RX ADMIN — Medication 40 MG: at 07:09

## 2022-08-10 RX ADMIN — MORPHINE SULFATE 2 MG: 4 INJECTION, SOLUTION INTRAMUSCULAR; INTRAVENOUS at 18:19

## 2022-08-10 RX ADMIN — INSULIN ASPART 2 UNITS: 100 INJECTION, SOLUTION INTRAVENOUS; SUBCUTANEOUS at 23:37

## 2022-08-10 RX ADMIN — Medication 15 ML: at 08:38

## 2022-08-10 RX ADMIN — POTASSIUM & SODIUM PHOSPHATES POWDER PACK 280-160-250 MG 1 PACKET: 280-160-250 PACK at 09:08

## 2022-08-10 RX ADMIN — OXYCODONE HYDROCHLORIDE 5 MG: 5 TABLET ORAL at 07:08

## 2022-08-10 RX ADMIN — POTASSIUM & SODIUM PHOSPHATES POWDER PACK 280-160-250 MG 1 PACKET: 280-160-250 PACK at 07:08

## 2022-08-10 RX ADMIN — MORPHINE SULFATE 2 MG: 4 INJECTION, SOLUTION INTRAMUSCULAR; INTRAVENOUS at 08:38

## 2022-08-10 RX ADMIN — CEFEPIME HYDROCHLORIDE 2 G: 2 INJECTION, POWDER, FOR SOLUTION INTRAVENOUS at 23:33

## 2022-08-10 RX ADMIN — OXCARBAZEPINE 450 MG: 300 SUSPENSION ORAL at 22:25

## 2022-08-10 RX ADMIN — METRONIDAZOLE 500 MG: 500 INJECTION, SOLUTION INTRAVENOUS at 15:56

## 2022-08-10 RX ADMIN — VANCOMYCIN HYDROCHLORIDE 1000 MG: 1 INJECTION, SOLUTION INTRAVENOUS at 08:38

## 2022-08-10 RX ADMIN — CEFEPIME HYDROCHLORIDE 2 G: 2 INJECTION, POWDER, FOR SOLUTION INTRAVENOUS at 07:08

## 2022-08-10 RX ADMIN — OXYCODONE HYDROCHLORIDE 5 MG: 5 TABLET ORAL at 01:36

## 2022-08-10 RX ADMIN — INSULIN ASPART 1 UNITS: 100 INJECTION, SOLUTION INTRAVENOUS; SUBCUTANEOUS at 00:30

## 2022-08-10 RX ADMIN — INSULIN ASPART 1 UNITS: 100 INJECTION, SOLUTION INTRAVENOUS; SUBCUTANEOUS at 09:07

## 2022-08-10 RX ADMIN — INSULIN ASPART 2 UNITS: 100 INJECTION, SOLUTION INTRAVENOUS; SUBCUTANEOUS at 20:06

## 2022-08-10 RX ADMIN — INSULIN ASPART 1 UNITS: 100 INJECTION, SOLUTION INTRAVENOUS; SUBCUTANEOUS at 11:12

## 2022-08-10 RX ADMIN — VANCOMYCIN HYDROCHLORIDE 125 MG: KIT at 08:38

## 2022-08-10 RX ADMIN — LEVETIRACETAM 1000 MG: 10 INJECTION INTRAVENOUS at 09:33

## 2022-08-10 RX ADMIN — METRONIDAZOLE 500 MG: 500 INJECTION, SOLUTION INTRAVENOUS at 08:38

## 2022-08-10 RX ADMIN — OXYCODONE HYDROCHLORIDE 5 MG: 5 TABLET ORAL at 10:57

## 2022-08-10 RX ADMIN — LEVETIRACETAM 1000 MG: 10 INJECTION INTRAVENOUS at 22:38

## 2022-08-10 RX ADMIN — VANCOMYCIN HYDROCHLORIDE 1000 MG: 1 INJECTION, SOLUTION INTRAVENOUS at 19:59

## 2022-08-10 RX ADMIN — VANCOMYCIN HYDROCHLORIDE 125 MG: KIT at 12:46

## 2022-08-10 RX ADMIN — INSULIN ASPART 2 UNITS: 100 INJECTION, SOLUTION INTRAVENOUS; SUBCUTANEOUS at 04:18

## 2022-08-10 RX ADMIN — MORPHINE SULFATE 2 MG: 4 INJECTION, SOLUTION INTRAMUSCULAR; INTRAVENOUS at 12:46

## 2022-08-10 ASSESSMENT — ACTIVITIES OF DAILY LIVING (ADL)
ADLS_ACUITY_SCORE: 46
ADLS_ACUITY_SCORE: 40
ADLS_ACUITY_SCORE: 46
ADLS_ACUITY_SCORE: 46
ADLS_ACUITY_SCORE: 42
ADLS_ACUITY_SCORE: 46
ADLS_ACUITY_SCORE: 42
ADLS_ACUITY_SCORE: 46
ADLS_ACUITY_SCORE: 46
ADLS_ACUITY_SCORE: 42

## 2022-08-10 NOTE — PLAN OF CARE
Shift Summary   [id number]    No significant changes this shift. Neuro's unchanged. Intermittently following. Hemodynamically stable.     Plan: Full code. q4h neuro checks. Transfer to neuro floor when bed available.

## 2022-08-10 NOTE — PROGRESS NOTES
[facility]    Medicine Progress Note - Hospitalist Service    Date of Admission:  7/27/2022    Assessment & Plan        Julisa Chaney is a 77-year-old woman with medical history which includes trigeminal neuralgia who was recently admitted to [facility] on 7/19 for severe sepsis(had leukocytosis, lactic acidosis and elevated procalcitonin)   due to suspected COVID-19 infection and suspected bacterial pneumonia. recent COVID-19 infection which was diagnosed on 7/19/2022 and was treated with 5 days of remdesivir and did not require any steroids and she was treated with IV vancomycin and IV meropenem.     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  She was switched to IV vancomycin, cefepime and metronidazole and transferred to [facility] for neurosurgery assessment. MRI brain w/wo contrast 7/30 showed ventriculitis with probable abscess. Neurosurgery/neurology/infectious disease/oncology consulted. Patient taken to the OR on 7/31 for left frontal ventriculostomy.      Severe sepsis secondary to ventriculitis with abscess-improving  Status left frontal ventriculostomy 7/31/22  -Imaging as noted above.  -Neurosurgery and oncology were consulted and flow cytometry was done which did not show any evidence of malignancy.  -Patient underwent left frontal ventriculostomy on 7/31/2022. Post-op management per neurosurgery team.  -CSF sent on 8/5 which revealed , , glucose 98 and protein 84. WBC this AM 25, . CSF 2cc for repeat labs. CSF with sediment.   -EVD was clamped on 8/6 and removed on 8/8.  -ID following.  -Currently on IV vancomycin, cefepime, and metronidazole. ID planning 6 weeks of treatment with  current regimen - unable to de-escalate as cultures have been negative.  -Transfer orders out of ICU entered on 8/9, awaiting open neuro bed.  -Had a fever on 8/9/22. Received PRBC transfusion the previous evening, but this was completed about 10 hours prior to the fever. Blood cultures ordered by ID, negative to date. Consider repeat imaging if recurrent fevers.  -Repeat labs in AM.      Acute infectious encephalopathy due to above  Appeared to be improving for a few days, was starting to talk a little, but has not been verbal while I have been in the room the past two days.  -Continue to treat underlying issues as noted above.  -Reorient as needed.  -Maintain normal sleep/wake cycle as able.  -Avoid opioid pain medications as able.     Sinus tachycardia  -Treat underlying issues as noted.  -Continue to monitor.     C. difficile colitis  Patient was noted to have copious diarrhea on 8/1 and sample came back positive for C. Difficile.  -Continue oral vancomycin.  -Rectal tube still in place, liquid stool output appears to be improving.  -ID following.    Acute on chronic anemia  Prior labs show baseline hemoglobin of about 9-10. Hemoglobin on admission was 11.7. Has slowly trended down, now low enough to recommend PRBC transfusion on 8/8/22. No obvious ongoing bleeding noted. WBC and platelets - normal-elevated. Iron panel on 7/30/22 showed that her total iron was low at 14, binding capacity was low at 28, iron saturation was 6 and B12 was high and folate was normal.  -Repeat hemoglobin 8/8 lower at 6.4, then 6.9 on recheck. Transfused one unit PRBCs on 8/8/22.  -Hemoglobin improved to 8.5 on 8/9, stable at 8.6 on 8/10.  -She is iron deficient, consider IV iron transfusion.  -If hemoglobin trends down again, will ask hematology to evaluate.    History of trigeminal neuralgia  -Continue PTA Trileptal and Topamax.     History of chronic pyloric ulcer  She had EGD done in 2020 which showed gastroduodenitis with a  duodenal stricture.  -Continue IV PPI.    Hyperglycemia  Recent HbA1c was 5.6 on 7/19/22. Was not on any medication prior to admission.  -Blood sugars mildly elevated.  -Continue medium dose sliding scale insulin every 4 hours.  -Add lantus 5 units at bedtime, titrate as indicated.     Hypokalemia  Hyponatremia  Hypophosphatemia  -Recheck and replace per protocol.     Severe protein calorie malnutrition  -Dietitian consulted.  -Currently getting tube feedings and free water flushes via NG feeding tube.  -Will need SLP consult when more alert and able to participate.     Physical deconditioning  -Patient seems severely deconditioned.  -PT/OT consulted, although not really able to participate at this point due to her mental status.         Diet: Adult Formula Drip Feeding: Continuous Osmolite 1.5; Other - Specify in Comment; Goal Rate: 50; mL/hr; Medication - Feeding Tube Flush Frequency: At least 15-30 mL water before and after medication administration and with tube clogging; Amount to ...    DVT Prophylaxis: Pneumatic Compression Devices  Abrams Catheter: Not present  Central Lines: PRESENT  PICC Triple Lumen 07/22/22 Right Basilic Vascular access-Site Assessment: WDL  Cardiac Monitoring: ACTIVE order. Indication: ICU  Code Status: Full Code      Disposition Plan     Expected Discharge Date: 08/12/2022        Discharge Comments: Needs NG feeding tube placed        The patient's care was discussed with the Bedside Nurse and Patient.    Elton Nguyen MD  Hospitalist Service  [facility]  Securely message with the Vocera Web Console (learn more here)  Text page via [employer] Paging/Directory         Clinically Significant Risk Factors Present on Admission                      ______________________________________________________________________    Interval History   Julisa Chaney was seen today. She was sleeping when I entered the room. She woke up to my voice. Did not speak or reliably  follow commands. Discussed with nursing.    Data reviewed today: I reviewed all medications, new labs and imaging results over the last 24 hours. I personally reviewed no images or EKG's today.    Physical Exam   Vital Signs: Temp: 98.7  F (37.1  C) Temp src: Axillary BP: 119/61 Pulse: 99   Resp: 20 SpO2: 94 % O2 Device: None (Room air)    Weight: 121 lbs 11.1 oz  Constitutional: awake, drowsy, no apparent distress, laying in the ICU bed  Respiratory: clear to auscultation bilaterally, no crackles or wheezing  Cardiovascular: regular rate and rhythm, normal S1 and S2, no murmur noted  GI: normal bowel sounds, soft, non-distended, non-tender, NG feeding tube in place, rectal tube in place  Skin: warm, dry  Musculoskeletal: no lower extremity pitting edema present  Neurologic: awake, drowsy, did not speak or follow commands for me    Data   Recent Labs   Lab 08/10/22  1108 08/10/22  0906 08/10/22  0417 08/09/22  0420 08/09/22  0418 08/09/22  0251 08/09/22  0247 08/08/22  1629 08/08/22  1615 08/08/22  0825 08/08/22  0610   WBC  --   --  9.8  --  10.1  --   --   --  9.9  --   --    HGB  --   --  8.6*  --  8.5*  --  8.4*   < > 6.4*  --   --    MCV  --   --  75*  --  74*  --   --   --  73*  --   --    PLT  --   --  491*  --  497*  --   --   --  378  --   --    NA  --   --  135  --  135  --   --   --   --   --  134   POTASSIUM  --   --  3.8  --  3.8  --   --   --   --   --  4.0   CHLORIDE  --   --  101  --  103  --   --   --   --   --  102   CO2  --   --  28  --  28  --   --   --   --   --  27   BUN  --   --  12  --  10  --   --   --   --   --  12   CR  --   --  0.32*  --  0.32*  --   --   --   --   --  0.36*   ANIONGAP  --   --  6  --  4  --   --   --   --   --  5   ADY  --   --  8.7  --  8.6  --   --   --   --   --  8.2*   * 181* 243*   < > 181*   < >  --    < >  --    < > 139*   ALBUMIN  --   --   --   --  1.9*  --   --   --   --   --   --    PROTTOTAL  --   --   --   --  5.8*  --   --   --   --   --   --     BILITOTAL  --   --   --   --  0.4  --   --   --   --   --   --    ALKPHOS  --   --   --   --  72  --   --   --   --   --   --    ALT  --   --   --   --  9  --   --   --   --   --   --    AST  --   --   --   --  6  --   --   --   --   --   --     < > = values in this interval not displayed.     Medications     - MEDICATION INSTRUCTIONS -         ceFEPIme (MAXIPIME) IV  2 g Intravenous Q8H     insulin aspart  1-6 Units Subcutaneous Q4H     levETIRAcetam  1,000 mg Intravenous Q12H     metroNIDAZOLE  500 mg Intravenous Q8H     multivitamins w/minerals  15 mL Per Feeding Tube Daily     OXcarbazepine  450 mg Oral or Feeding Tube At Bedtime     pantoprazole  40 mg Oral or Feeding Tube QAM AC     sodium chloride (PF)  10-40 mL Intracatheter Q7 Days     topiramate  50 mg Oral or Feeding Tube At Bedtime     vancomycin  125 mg Oral or Feeding Tube 4x Daily     vancomycin  1,000 mg Intravenous Q12H

## 2022-08-10 NOTE — PLAN OF CARE
"Mostly alert, non-verbal. Says \"ouch\" rarely, otherwise does not respond. Does not follow commands. Purposeful movement with LUE/LLE, withdraws in RUE/RLE. Morphine, oxy given for pain control; pt gives non-verbal indicators of pain frequently. T&R maintained. Incontinent B/B, rectal tube in place. Enteric/contact precautions. Up in chair X 2 hours. ID, neurosurgery following. IV abx continued. Continue to monitor.   "

## 2022-08-10 NOTE — PROGRESS NOTES
[facility]  Neurosurgery Daily Progress Note    Assessment & Plan   Procedure(s):  LEFT FRONTAL VENTRICULOSTOMY   -10 Days Post-Op  Patient remains in ICU. She opens eyes to voice, PERRL, able to squeeze hands to command and hold arms in the air. EVD removed on 8//8. Incision CDI with suture/staples.     Plan:  - Continue supportive and symptomatic treatment  - Routine wound care   - Utilize pain control measures as needed   - PT/OT   - Abx per ID   - Transfer to neuro floor when bed available   - Appreciate assistance from specialties     Claudia Chowdhury CNP  [facility] Neurosurgery  [facility]  [address]  [place] [address]  Tel [phone]  Pager [phone]    Interval History   Stable     Physical Exam   Temp: 97  F (36.1  C) Temp src: Axillary BP: 136/75 Pulse: 109   Resp: 27 SpO2: 94 % O2 Device: None (Room air)    Vitals:    08/08/22 0500 08/09/22 0400 08/10/22 0408   Weight: 54.6 kg (120 lb 5.9 oz) 51.1 kg (112 lb 10.5 oz) 55.2 kg (121 lb 11.1 oz)     Vital Signs with Ranges  Temp:  [97  F (36.1  C)-98.5  F (36.9  C)] 97  F (36.1  C)  Pulse:  [] 109  Resp:  [13-38] 27  BP: (103-144)/(48-75) 136/75  SpO2:  [94 %-98 %] 94 %  I/O last 3 completed shifts:  In: 3205 [I.V.:1050; NG/GT:955]  Out: 1150 [Urine:800; Stool:350]    Opens eyes to voice  PERRL  Able to squeeze hands to command and hold arms in the air   Withdraws to pain in all extremities   Incision CDI with suture/staples     Medications     - MEDICATION INSTRUCTIONS -          ceFEPIme (MAXIPIME) IV  2 g Intravenous Q8H     insulin aspart  1-6 Units Subcutaneous Q4H     levETIRAcetam  1,000 mg Intravenous Q12H     metroNIDAZOLE  500 mg Intravenous Q8H     multivitamins w/minerals  15 mL Per Feeding Tube Daily     OXcarbazepine  450 mg Oral or Feeding Tube At Bedtime     pantoprazole  40 mg Oral or Feeding Tube QAM AC     potassium & sodium phosphates  1 packet Oral or  Feeding Tube Q4H     sodium chloride (PF)  10-40 mL Intracatheter Q7 Days     topiramate  50 mg Oral or Feeding Tube At Bedtime     vancomycin  125 mg Oral or Feeding Tube 4x Daily     vancomycin  1,000 mg Intravenous Q12H       Claudia Chowdhury [facility] Neurosurgery   [address] [address]  Tel [phone]  Pager [phone]

## 2022-08-10 NOTE — PROGRESS NOTES
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 Has been on metronidaole/ceftriaxone/vancomycin, s/p left frontal ventriculostomy  CSF cxs have remained negative. Course complicated by development of C.diff colitis..     Recommendations  1. Will need repeat MRI of the brain W/WO contrast to reassess abscess at  A later date  2. Maintain on Vancomycin, Cefepime, Metronidazole IV and oral vancomycin for C.diff. will need to complete long term IV antibiotic therapy for brain abscess for 4-6 weeks    Follow clinical status and labs    Nuzhat Hudson MD    Interval History   Remains confused, afebrile now, no leukocytosis, ventriculostomy was removed, diarrhea has improved,     Physical Exam   Temp: 98.5  F (36.9  C) Temp src: Axillary BP: (!) 142/60 Pulse: (!) 121   Resp: 26 SpO2: 95 % O2 Device: None (Room air)    Vitals:    08/08/22 0500 08/09/22 0400 08/10/22 0408   Weight: 54.6 kg (120 lb 5.9 oz) 51.1 kg (112 lb 10.5 oz) 55.2 kg (121 lb 11.1 oz)     Vital Signs with Ranges  Temp:  [97  F (36.1  C)-98.5  F (36.9  C)] 98.5  F (36.9  C)  Pulse:  [] 121  Resp:  [13-38] 26  BP: (103-144)/(48-75) 142/60  SpO2:  [94 %-98 %] 95 %    Constitutional: opens eyes but does not follow commands, confused. Feeding tube in place  Lungs: Clear to auscultation bilaterally, no crackles or wheezing  Cardiovascular: S1S2  Abdomen: Normal bowel sounds, soft, non-distended, non-tender  Skin: No rashes, no cyanosis, no edema    Other:    Medications     - MEDICATION INSTRUCTIONS -         ceFEPIme (MAXIPIME) IV  2 g Intravenous Q8H     insulin aspart  1-6 Units Subcutaneous Q4H     levETIRAcetam  1,000 mg Intravenous Q12H     metroNIDAZOLE  500 mg Intravenous Q8H     multivitamins w/minerals  15 mL Per Feeding  Tube Daily     OXcarbazepine  450 mg Oral or Feeding Tube At Bedtime     pantoprazole  40 mg Oral or Feeding Tube QAM AC     potassium & sodium phosphates  1 packet Oral or Feeding Tube Q4H     sodium chloride (PF)  10-40 mL Intracatheter Q7 Days     topiramate  50 mg Oral or Feeding Tube At Bedtime     vancomycin  125 mg Oral or Feeding Tube 4x Daily     vancomycin  1,000 mg Intravenous Q12H       Data   All microbiology laboratory data reviewed.  Recent Labs   Lab Test 08/10/22  0417 08/09/22 0418 08/09/22  0247 08/08/22  2000 08/08/22  1615   WBC 9.8 10.1  --   --  9.9   HGB 8.6* 8.5* 8.4*   < > 6.4*   HCT 29.1* 28.8*  --   --  22.8*   MCV 75* 74*  --   --  73*   * 497*  --   --  378    < > = values in this interval not displayed.     Recent Labs   Lab Test 08/10/22  0417 08/09/22  0418 08/08/22  0610   CR 0.32* 0.32* 0.36*     Recent Labs   Lab Test 08/08/20  0212   SED 13     Imaging  8/8 Ct head  CT OF THE HEAD WITHOUT CONTRAST   8/8/2022 10:58 AM      COMPARISON: 8/7/2022.     HISTORY:  External ventricular drain.      TECHNIQUE: Axial CT images of the head from the skull base to the  vertex were acquired without IV contrast. Dose reduction techniques  were used.      FINDINGS: Image quality is degraded by motion.     INTRACRANIAL CONTENTS: Redemonstrated left frontal approach external  ventricular drain terminating within the body of the left lateral  ventricle. Asymmetric dilation of the left lateral ventricle similar  to the previous study. The ventricular system is otherwise  unremarkable. No acute hemorrhage or extra-axial collection. Vasogenic  edema within the left frontal white matter again noted. No mass  effect. Chronic infarctions within cerebellar hemispheres.     VISUALIZED ORBITS/SINUSES/MASTOIDS: No significant orbital  abnormality.  No significant paranasal sinus mucosal disease. No  significant middle ear or mastoid effusion.     OSSEOUS STRUCTURES/SOFT TISSUES: No significant  abnormality.                                                                      IMPRESSION:  1.  Unchanged left frontal approach ventriculostomy with stable  dilation of the left lateral ventricle.  2.  Unchanged vasogenic edema left frontal white matter

## 2022-08-10 NOTE — PROGRESS NOTES
Service Date: 08/10/2022    SUBJECTIVE:  Ms. Chaney is a 77-year-old female with brain ventriculitis with abscess.  The patient had a left frontal ventriculostomy and drain placement on 22.  No specimen was collected for pathology.  The patient did have a lumbar puncture, which was negative for malignancy.    The patient has microcytic anemia.  Labs on 22 revealed iron deficiency.  The patient's hemoglobin has been monitored. Hemoglobin decraesed to 6.4 on 22.  She received blood transfusion.  Hemoglobin is better at 8.6 today.    The patient was seen in the ICU.  She is very weak.  She opened her eyes and looked at me when asked.  She is not in any respiratory distress.  She is remaining afebrile.    LABORATORY:  Reviewed.    ASSESSMENT:    1.  A 77-year-old female with brain ventriculitis and abscess.  The patient is status post left frontal ventriculostomy.  2.  Encephalopathy.  3.  Microcytic anemia.  4.  Iron deficiency.  5.  Thrombocytosis, reactive.    PLAN:    1.  The patient had brain abscess. She had venting ventriculostomy done.  She is on antibiotics.  Management as per Neurosurgery and ID.  2.  The patient has microcytic anemia.  She has iron deficiency.  We will give her IV Venofer.  That should help with anemia and iron deficiency.  Her thrombocytosis should resolve as her iron deficiency resolves.  3.  Hematology/Oncology will sign off.  Please call us with any questions.    Lexi Fuller MD        D: 08/10/2022   T: 08/10/2022   MT: CHRISTELLE    Name:     CHELITA CHANEY  MRN:      [id number]        Account:      [id number]   :      1945           Service Date: 08/10/2022       Document: [id number]

## 2022-08-11 LAB
ANION GAP SERPL CALCULATED.3IONS-SCNC: 6 MMOL/L (ref 3–14)
BACTERIA CSF CULT: NO GROWTH
BUN SERPL-MCNC: 11 MG/DL (ref 7–30)
CALCIUM SERPL-MCNC: 8.5 MG/DL (ref 8.5–10.1)
CHLORIDE BLD-SCNC: 98 MMOL/L (ref 94–109)
CO2 SERPL-SCNC: 28 MMOL/L (ref 20–32)
CREAT SERPL-MCNC: 0.34 MG/DL (ref 0.52–1.04)
ERYTHROCYTE [DISTWIDTH] IN BLOOD BY AUTOMATED COUNT: 20.2 % (ref 10–15)
GFR SERPL CREATININE-BSD FRML MDRD: >90 ML/MIN/1.73M2
GLUCOSE BLD-MCNC: 160 MG/DL (ref 70–99)
GLUCOSE BLDC GLUCOMTR-MCNC: 156 MG/DL (ref 70–99)
GLUCOSE BLDC GLUCOMTR-MCNC: 172 MG/DL (ref 70–99)
GLUCOSE BLDC GLUCOMTR-MCNC: 218 MG/DL (ref 70–99)
GLUCOSE BLDC GLUCOMTR-MCNC: 236 MG/DL (ref 70–99)
GLUCOSE BLDC GLUCOMTR-MCNC: 240 MG/DL (ref 70–99)
GRAM STAIN RESULT: NORMAL
GRAM STAIN RESULT: NORMAL
HCT VFR BLD AUTO: 30.2 % (ref 35–47)
HGB BLD-MCNC: 9.1 G/DL (ref 11.7–15.7)
HOLD SPECIMEN: NORMAL
MCH RBC QN AUTO: 21.8 PG (ref 26.5–33)
MCHC RBC AUTO-ENTMCNC: 30.1 G/DL (ref 31.5–36.5)
MCV RBC AUTO: 72 FL (ref 78–100)
PHOSPHATE SERPL-MCNC: 3.1 MG/DL (ref 2.5–4.5)
PLATELET # BLD AUTO: 540 10E3/UL (ref 150–450)
POTASSIUM BLD-SCNC: 4.4 MMOL/L (ref 3.4–5.3)
RBC # BLD AUTO: 4.17 10E6/UL (ref 3.8–5.2)
SODIUM SERPL-SCNC: 132 MMOL/L (ref 133–144)
VANCOMYCIN SERPL-MCNC: 17.2 MG/L
WBC # BLD AUTO: 10 10E3/UL (ref 4–11)

## 2022-08-11 PROCEDURE — 250N000011 HC RX IP 250 OP 636: Performed by: HOSPITALIST

## 2022-08-11 PROCEDURE — 36415 COLL VENOUS BLD VENIPUNCTURE: CPT | Performed by: HOSPITALIST

## 2022-08-11 PROCEDURE — 85027 COMPLETE CBC AUTOMATED: CPT | Performed by: HOSPITALIST

## 2022-08-11 PROCEDURE — 250N000013 HC RX MED GY IP 250 OP 250 PS 637: Performed by: HOSPITALIST

## 2022-08-11 PROCEDURE — 250N000011 HC RX IP 250 OP 636: Performed by: INTERNAL MEDICINE

## 2022-08-11 PROCEDURE — 80048 BASIC METABOLIC PNL TOTAL CA: CPT | Performed by: HOSPITALIST

## 2022-08-11 PROCEDURE — 99232 SBSQ HOSP IP/OBS MODERATE 35: CPT | Performed by: HOSPITALIST

## 2022-08-11 PROCEDURE — 80202 ASSAY OF VANCOMYCIN: CPT | Performed by: INTERNAL MEDICINE

## 2022-08-11 PROCEDURE — 200N000001 HC R&B ICU

## 2022-08-11 PROCEDURE — 84100 ASSAY OF PHOSPHORUS: CPT | Performed by: INTERNAL MEDICINE

## 2022-08-11 PROCEDURE — 258N000003 HC RX IP 258 OP 636: Performed by: INTERNAL MEDICINE

## 2022-08-11 PROCEDURE — 999N000190 HC STATISTIC VAT ROUNDS

## 2022-08-11 RX ADMIN — OXYCODONE HYDROCHLORIDE 5 MG: 5 TABLET ORAL at 20:46

## 2022-08-11 RX ADMIN — IRON SUCROSE 300 MG: 20 INJECTION, SOLUTION INTRAVENOUS at 09:47

## 2022-08-11 RX ADMIN — METRONIDAZOLE 500 MG: 500 INJECTION, SOLUTION INTRAVENOUS at 15:44

## 2022-08-11 RX ADMIN — VANCOMYCIN HYDROCHLORIDE 125 MG: KIT at 22:36

## 2022-08-11 RX ADMIN — ACETAMINOPHEN 650 MG: 325 SUSPENSION ORAL at 20:46

## 2022-08-11 RX ADMIN — Medication 40 MG: at 08:55

## 2022-08-11 RX ADMIN — VANCOMYCIN HYDROCHLORIDE 125 MG: KIT at 18:00

## 2022-08-11 RX ADMIN — INSULIN ASPART 2 UNITS: 100 INJECTION, SOLUTION INTRAVENOUS; SUBCUTANEOUS at 09:19

## 2022-08-11 RX ADMIN — METRONIDAZOLE 500 MG: 500 INJECTION, SOLUTION INTRAVENOUS at 08:55

## 2022-08-11 RX ADMIN — CEFEPIME HYDROCHLORIDE 2 G: 2 INJECTION, POWDER, FOR SOLUTION INTRAVENOUS at 15:44

## 2022-08-11 RX ADMIN — LEVETIRACETAM 1000 MG: 10 INJECTION INTRAVENOUS at 22:36

## 2022-08-11 RX ADMIN — Medication 15 ML: at 08:55

## 2022-08-11 RX ADMIN — TOPIRAMATE 50 MG: 50 TABLET ORAL at 22:36

## 2022-08-11 RX ADMIN — VANCOMYCIN HYDROCHLORIDE 1000 MG: 1 INJECTION, SOLUTION INTRAVENOUS at 10:47

## 2022-08-11 RX ADMIN — INSULIN ASPART 3 UNITS: 100 INJECTION, SOLUTION INTRAVENOUS; SUBCUTANEOUS at 13:27

## 2022-08-11 RX ADMIN — INSULIN ASPART 1 UNITS: 100 INJECTION, SOLUTION INTRAVENOUS; SUBCUTANEOUS at 15:53

## 2022-08-11 RX ADMIN — VANCOMYCIN HYDROCHLORIDE 1000 MG: 1 INJECTION, SOLUTION INTRAVENOUS at 22:36

## 2022-08-11 RX ADMIN — CEFEPIME HYDROCHLORIDE 2 G: 2 INJECTION, POWDER, FOR SOLUTION INTRAVENOUS at 09:07

## 2022-08-11 RX ADMIN — INSULIN ASPART 2 UNITS: 100 INJECTION, SOLUTION INTRAVENOUS; SUBCUTANEOUS at 20:31

## 2022-08-11 RX ADMIN — VANCOMYCIN HYDROCHLORIDE 125 MG: KIT at 13:29

## 2022-08-11 RX ADMIN — OXCARBAZEPINE 450 MG: 300 SUSPENSION ORAL at 22:36

## 2022-08-11 RX ADMIN — LEVETIRACETAM 1000 MG: 10 INJECTION INTRAVENOUS at 10:47

## 2022-08-11 RX ADMIN — ACETAMINOPHEN 650 MG: 325 SUSPENSION ORAL at 15:44

## 2022-08-11 RX ADMIN — VANCOMYCIN HYDROCHLORIDE 125 MG: KIT at 10:47

## 2022-08-11 RX ADMIN — INSULIN ASPART 1 UNITS: 100 INJECTION, SOLUTION INTRAVENOUS; SUBCUTANEOUS at 04:13

## 2022-08-11 RX ADMIN — ACETAMINOPHEN 650 MG: 325 SUSPENSION ORAL at 08:54

## 2022-08-11 ASSESSMENT — ACTIVITIES OF DAILY LIVING (ADL)
ADLS_ACUITY_SCORE: 46
ADLS_ACUITY_SCORE: 44
ADLS_ACUITY_SCORE: 46
ADLS_ACUITY_SCORE: 44
ADLS_ACUITY_SCORE: 46
ADLS_ACUITY_SCORE: 44
ADLS_ACUITY_SCORE: 46

## 2022-08-11 NOTE — PHARMACY-VANCOMYCIN DOSING SERVICE
Pharmacy Vancomycin Note  Date of Service 2022  Patient's  1945   77 year old, female    Indication: Abscess  Day of Therapy:   Current vancomycin regimen:  1000 mg IV q12h  Current vancomycin monitoring method: Trough (Method 1 = dosing nomogram)  Current vancomycin therapeutic monitoring goal: 15-20 mg/L    Current estimated CrCl = Estimated Creatinine Clearance: 120.3 mL/min (A) (based on SCr of 0.34 mg/dL (L)).    Creatinine for last 3 days  2022:  4:18 AM Creatinine 0.32 mg/dL  8/10/2022:  4:17 AM Creatinine 0.32 mg/dL  2022:  7:14 AM Creatinine 0.34 mg/dL    Recent Vancomycin Levels (past 3 days)  2022:  7:14 AM Vancomycin 17.2 mg/L    Vancomycin IV Administrations (past 72 hours)                   vancomycin (VANCOCIN) 1000 mg in dextrose 5% 200 mL PREMIX (mg) 1,000 mg New Bag 08/10/22 195     1,000 mg New Bag  0838     1,000 mg New Bag 22 213     1,000 mg New Bag  0810     1,000 mg New Bag 22                Nephrotoxins and other renal medications (From now, onward)    Start     Dose/Rate Route Frequency Ordered Stop    22 0900  vancomycin (FIRVANQ) oral solution 125 mg         125 mg Oral or Feeding Tube 4 TIMES DAILY 22 0819      22 0500  vancomycin (VANCOCIN) 1000 mg in dextrose 5% 200 mL PREMIX         1,000 mg  200 mL/hr over 1 Hours Intravenous EVERY 12 HOURS 22 2242               Contrast Orders - past 72 hours (72h ago, onward)    None          Interpretation of levels and current regimen:  Vancomycin level is reflective of therapeutic level    Has serum creatinine changed greater than 50% in last 72 hours: No    Urine output:  good urine output    Renal Function: Stable    Plan:  1. Continue Current Dose  2. Vancomycin monitoring method: Trough (Method 1 = dosing nomogram)  3. Vancomycin therapeutic monitoring goal: 15-20 mg/L  4. Pharmacy will check vancomycin levels as appropriate in 1-3 Days.  5. Serum creatinine levels  will be ordered daily for the first week of therapy and at least twice weekly for subsequent weeks.    Haley Munson [facility], PharmD

## 2022-08-11 NOTE — PLAN OF CARE
Goal Outcome Evaluation:    Overall Patient Progress: no change    Outcome Evaluation: TF at goal. Given prior high RT output and plans to txr out of ICU, change TF to Jevity 1.5 to add back fiber.

## 2022-08-11 NOTE — PROGRESS NOTES
"The patient remains alert to self. She is nonverbal beyond saying \"ouch\" when turned or when blood sugar check is done. PERRLA. Moves arms purposefully. Left more so than right. Legs weaker than arms. Right side weaker than left. Less spontaneous movement in legs.   Sinus rhythm/Sinus tachy on monitor. BP stable.   Lung sounds clear/diminished. SPO2>92% on room air.   Tube feeding infusing at goal. New tube feeding formula was ordered by dietary.   External catheter in use. Good output.   Rectal tube device in use for loose stools. Moderate output this shift.   Ebenezer Gallardo RN 4:11 PM 08/11/22      "

## 2022-08-11 NOTE — PROGRESS NOTES
[facility]    Medicine Progress Note - Hospitalist Service    Date of Admission:  7/27/2022    Assessment & Plan        Julisa Chaney is a 77-year-old woman with medical history which includes trigeminal neuralgia who was recently admitted to [facility] on 7/19 for severe sepsis(had leukocytosis, lactic acidosis and elevated procalcitonin)   due to suspected COVID-19 infection and suspected bacterial pneumonia. recent COVID-19 infection which was diagnosed on 7/19/2022 and was treated with 5 days of remdesivir and did not require any steroids and she was treated with IV vancomycin and IV meropenem.     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  She was switched to IV vancomycin, cefepime and metronidazole and transferred to [facility] for neurosurgery assessment. MRI brain w/wo contrast 7/30 showed ventriculitis with probable abscess. Neurosurgery/neurology/infectious disease/oncology consulted. Patient taken to the OR on 7/31 for left frontal ventriculostomy.      Severe sepsis secondary to ventriculitis with abscess-improving  Status left frontal ventriculostomy 7/31/22  -Imaging as noted above.  -Neurosurgery and oncology were consulted and flow cytometry was done which did not show any evidence of malignancy.  -Patient underwent left frontal ventriculostomy on 7/31/2022. Post-op management per neurosurgery team.  -CSF sent on 8/5 which revealed , , glucose 98 and protein 84. WBC this AM 25, . CSF 2cc for repeat labs. CSF with sediment.   -EVD was clamped on 8/6 and removed on 8/8.  -ID following.  -Currently on IV vancomycin, cefepime, and metronidazole. ID planning 6 weeks of treatment with  current regimen - unable to de-escalate as cultures have been negative.  -Transfer orders out of ICU entered on 8/9, awaiting open neuro bed.  -Had a fever on 8/9/22. Received PRBC transfusion the previous evening, but this was completed about 10 hours prior to the fever. Blood cultures ordered by ID, negative to date. Consider repeat imaging if recurrent fevers.  -Repeat labs in AM.      Acute infectious encephalopathy due to above  Appeared to be improving for a few days, was starting to talk a little, but has not been verbal while I have been in the room the past two days.  -Continue to treat underlying issues as noted above.  -Reorient as needed.  -Maintain normal sleep/wake cycle as able.  -Avoid opioid pain medications as able.     Sinus tachycardia  -Treat underlying issues as noted.  -Continue to monitor.     C. difficile colitis  Patient was noted to have copious diarrhea on 8/1 and sample came back positive for C. Difficile.  -Continue oral vancomycin.  -Rectal tube still in place, liquid stool output appears to be slowly improving.  -ID following.    Acute on chronic anemia  Prior labs show baseline hemoglobin of about 9-10. Hemoglobin on admission was 11.7. Has slowly trended down, now low enough to recommend PRBC transfusion on 8/8/22. No obvious ongoing bleeding noted. WBC and platelets - normal-elevated. Iron panel on 7/30/22 showed that her total iron was low at 14, binding capacity was low at 28, iron saturation was 6 and B12 was high and folate was normal.  -Repeat hemoglobin 8/8 lower at 6.4, then 6.9 on recheck. Transfused one unit PRBCs on 8/8/22.  -Hemoglobin stable/improved at 9.1 on 8/11/22.  -She is iron deficient, received IV iron infusion on 8/11/22.  -Recheck labs in AM.    History of trigeminal neuralgia  -Continue PTA Trileptal and Topamax.     History of chronic pyloric ulcer  She had EGD done in 2020 which showed gastroduodenitis with a duodenal stricture.  -Continue IV  PPI.    Hyperglycemia  Recent HbA1c was 5.6 on 7/19/22. Was not on any medication prior to admission.  -Blood sugars mildly elevated.  -Continue medium dose sliding scale insulin every 4 hours.  -Increase lantus to 10 units at bedtime, titrate as indicated.     Hypokalemia  Hyponatremia  Hypophosphatemia  -Recheck and replace per protocol.  -Sodium down to 132, will decrease free water flushes today.  -Recheck labs in AM.     Severe protein calorie malnutrition  -Dietitian consulted.  -Currently getting tube feedings and free water flushes via NG feeding tube.  -Will need SLP consult when more alert and able to participate.     Physical deconditioning  -Patient seems severely deconditioned.  -PT/OT consulted, although not really able to participate at this point due to her mental status.       Diet: Adult Formula Drip Feeding: Continuous Jevity 1.5; Nasogastric tube; Goal Rate: 50; mL/hr; Medication - Feeding Tube Flush Frequency: At least 15-30 mL water before and after medication administration and with tube clogging; Amount to Send (Nutrit...    DVT Prophylaxis: Pneumatic Compression Devices  Abrams Catheter: Not present  Central Lines: PRESENT  PICC Triple Lumen 07/22/22 Right Basilic Vascular access-Site Assessment: WDL except (unable to draw blood from it.)  Cardiac Monitoring: ACTIVE order. Indication: ICU  Code Status: Full Code      Disposition Plan     Expected Discharge Date: 08/12/2022        Discharge Comments: Needs NG feeding tube placed        The patient's care was discussed with the Bedside Nurse and Patient.    Elton Nguyen MD  Hospitalist Service  [facility]  Securely message with the Vocera Web Console (learn more here)  Text page via [employer] Paging/Directory         Clinically Significant Risk Factors Present on Admission                      ______________________________________________________________________    Interval History   Julisa Chaney was seen this  morning. She was sleeping in the ICU bed when I saw her. Opened her eyes to my voice, but did not speak or follow commands. Unable to get any history from her.    Data reviewed today: I reviewed all medications, new labs and imaging results over the last 24 hours. I personally reviewed no images or EKG's today.    Physical Exam   Vital Signs: Temp: 98.4  F (36.9  C) Temp src: Axillary BP: 123/76 Pulse: 98   Resp: 26 SpO2: 97 %      Weight: 121 lbs 4.05 oz  Constitutional: awake, drowsy, no apparent distress, laying in the ICU bed  Respiratory: clear to auscultation bilaterally, no crackles or wheezing  Cardiovascular: Normal apical impulse, regular rate and rhythm, normal S1 and S2, no S3 or S4, and no murmur noted  GI: No scars, normal bowel sounds, soft, non-distended, non-tender, no masses palpated, no hepatosplenomegally  Skin: warm, dry  Musculoskeletal: no lower extremity pitting edema present  Neurologic: awake, drowsy, did not speak, opened her eyes to my voice, did not follow commands    Data   Recent Labs   Lab 08/11/22  0918 08/11/22  0714 08/11/22  0411 08/10/22  0906 08/10/22  0417 08/09/22  0420 08/09/22  0418   WBC  --  10.0  --   --  9.8  --  10.1   HGB  --  9.1*  --   --  8.6*  --  8.5*   MCV  --  72*  --   --  75*  --  74*   PLT  --  540*  --   --  491*  --  497*   NA  --  132*  --   --  135  --  135   POTASSIUM  --  4.4  --   --  3.8  --  3.8   CHLORIDE  --  98  --   --  101  --  103   CO2  --  28  --   --  28  --  28   BUN  --  11  --   --  12  --  10   CR  --  0.34*  --   --  0.32*  --  0.32*   ANIONGAP  --  6  --   --  6  --  4   ADY  --  8.5  --   --  8.7  --  8.6   * 160* 156*   < > 243*   < > 181*   ALBUMIN  --   --   --   --   --   --  1.9*   PROTTOTAL  --   --   --   --   --   --  5.8*   BILITOTAL  --   --   --   --   --   --  0.4   ALKPHOS  --   --   --   --   --   --  72   ALT  --   --   --   --   --   --  9   AST  --   --   --   --   --   --  6    < > = values in this interval  not displayed.     Medications     - MEDICATION INSTRUCTIONS -         ceFEPIme (MAXIPIME) IV  2 g Intravenous Q8H     insulin aspart  1-6 Units Subcutaneous Q4H     insulin glargine  10 Units Subcutaneous At Bedtime     iron sucrose  300 mg Intravenous Q48H     levETIRAcetam  1,000 mg Intravenous Q12H     metroNIDAZOLE  500 mg Intravenous Q8H     multivitamins w/minerals  15 mL Per Feeding Tube Daily     OXcarbazepine  450 mg Oral or Feeding Tube At Bedtime     pantoprazole  40 mg Oral or Feeding Tube QAM AC     sodium chloride (PF)  10-40 mL Intracatheter Q7 Days     topiramate  50 mg Oral or Feeding Tube At Bedtime     vancomycin  125 mg Oral or Feeding Tube 4x Daily     vancomycin  1,000 mg Intravenous Q12H

## 2022-08-11 NOTE — PROGRESS NOTES
[facility]    Neurosurgery  Daily Post-Op Note    Assessment & Plan   Procedure(s):  LEFT FRONTAL VENTRICULOSTOMY   11 Days Post-Op  Continues to be alert to voice, moves all ext spontaneously, does not follow commands. She does withdraw to pain and cries out.   EVD was removed 8/8    Plan:  -Advance activity as tolerated  -Continue supportive and symptomatic treatment      Chucho Balderas PA-C    Interval History   Stable.      Physical Exam   Temp: 98.4  F (36.9  C) Temp src: Axillary BP: 123/76 Pulse: 98   Resp: 26 SpO2: 97 %      Vitals:    08/09/22 0400 08/10/22 0408 08/11/22 0600   Weight: 51.1 kg (112 lb 10.5 oz) 55.2 kg (121 lb 11.1 oz) 55 kg (121 lb 4.1 oz)     Vital Signs with Ranges  Temp:  [98.4  F (36.9  C)-98.9  F (37.2  C)] 98.4  F (36.9  C)  Pulse:  [] 98  Resp:  [13-26] 26  BP: (108-136)/(46-76) 123/76  SpO2:  [94 %-100 %] 97 %  I/O last 3 completed shifts:  In: 2305 [I.V.:1000; NG/GT:805]  Out: 1800 [Urine:1550; Stool:250]    Alert, not following any commands.  Moves all extremities equally.      Incision: CDI      Medications     - MEDICATION INSTRUCTIONS -          ceFEPIme (MAXIPIME) IV  2 g Intravenous Q8H     insulin aspart  1-6 Units Subcutaneous Q4H     insulin glargine  5 Units Subcutaneous At Bedtime     iron sucrose  300 mg Intravenous Q48H     levETIRAcetam  1,000 mg Intravenous Q12H     metroNIDAZOLE  500 mg Intravenous Q8H     multivitamins w/minerals  15 mL Per Feeding Tube Daily     OXcarbazepine  450 mg Oral or Feeding Tube At Bedtime     pantoprazole  40 mg Oral or Feeding Tube QAM AC     sodium chloride (PF)  10-40 mL Intracatheter Q7 Days     topiramate  50 mg Oral or Feeding Tube At Bedtime     vancomycin  125 mg Oral or Feeding Tube 4x Daily     vancomycin  1,000 mg Intravenous Q12H           Chucho Balderas PA-C  [facility] Neurosurgery  [facility]  [address] [place] [address]    Tel  [phone]  Pager [phone]

## 2022-08-11 NOTE — PROGRESS NOTES
CLINICAL NUTRITION SERVICES - REASSESSMENT NOTE      Recommendations Ordered by Registered Dietitian (RD):   Change to Jevity 1.5 to add back fiber with plans to transfer out of ICU  Jevity 1.5 Raymond @ goal of 50ml/hr (1200ml/day) will provide: 1800 kcals (37 kcal/kg), 76 g PRO (1.6 g/kg), 912 ml free H20, 258 g CHO, and 25 g fiber daily.    Start at a lower rate given reintroduction of fiber. Begin Jevity 1.5 at 20 mL/hr, advancing by 15 mL q 4 hrs to goal rate of 50 mL/hr      Malnutrition: (8/4)  % Weight Loss:  > 10% in 6 months (severe malnutrition)  % Intake:  Decreased intake does not meet criteria - EN meeting needs   Subcutaneous Fat Loss:  Orbital region moderate depletion  Muscle Loss:  Temporal region moderate depletion, Clavicle bone region moderate depletion, Acromion bone region moderate-severe depletion and Dorsal hand region severe depletion  Fluid Retention:  None noted     Malnutrition Diagnosis: Severe malnutrition  In Context of:  Acute illness or injury        EVALUATION OF PROGRESS TOWARD GOALS   Diet:  NPO    Nutrition Support:  Patient continues on goal TF regimen as follows ~     Nutrition Support Enteral:  Type of Feeding Tube: NG  Enteral Frequency:  Continuous  Enteral Regimen: Osmolite 1.5 at 50 mL/hr  Total Enteral Provisions: 1800 kcal (37 kcal/kg), 76 g protein (1.6 g/kg), 914 mL H2O, 245 g CHO, no fiber   Free Water Flush: 100 mL every 4 hours (8/7)  Liquid MVI/M    Intake/Tolerance:    Labs reviewed: Na 132 (L), K 4.4, Phos 3.1   Recent Labs   Lab 08/11/22  0714 08/11/22  0411 08/10/22  2336 08/10/22  2006 08/10/22  1537 08/10/22  1108   * 156* 205* 233* 257* 188*     Medications reviewed: MSSI + 5 units lantus at bedtime, NaPhos replaced yesterday  Stooling:  - 8/7: 900 mL  - 8/8: 800 mL  - 8/9: 550 mL  - 8/10: 150 mL  Wt: 121 lbs 4.05 oz  Vitals:    08/06/22 0600 08/08/22 0500 08/09/22 0400 08/10/22 0408   Weight: 53 kg (116 lb 13.5 oz) 54.6 kg (120 lb 5.9 oz) 51.1 kg (112  lb 10.5 oz) 55.2 kg (121 lb 11.1 oz)    08/11/22 0600   Weight: 55 kg (121 lb 4.1 oz)         ASSESSED NUTRITION NEEDS:  Dosing Weight 48.3 kg   Estimated Energy Needs: 0838-6217 kcals (35-40 Kcal/Kg)  Justification: repletion and underweight  Estimated Protein Needs: 70-95 grams protein (1.5-2 g pro/Kg)  Justification: repletion and hypercatabolism with critical illness  Estimated Fluid Needs: 4171-8558 mL (1 mL/Kcal)  Justification: maintenance      NEW FINDINGS:   Chart reviewed  Awaiting bed at [facility] for transfer     Previous Goals:   TF regimen will continue to meet % needs   Evaluation: Met    Previous Nutrition Diagnosis:   No nutrition diagnosis identified at this time   Evaluation: Completed      CURRENT NUTRITION DIAGNOSIS  Altered GI function related to diarrhea as evidenced by liquid RT output up to 900 mL/day, now improved down to 150 mL yesterday     INTERVENTIONS  Recommendations / Nutrition Prescription  Change to Jevity 1.5 to add back fiber with plans to transfer out of ICU  Jevity 1.5 Raymond @ goal of 50ml/hr (1200ml/day) will provide: 1800 kcals, 76 g PRO, 912 ml free H20, 258 g CHO, and 25 g fiber daily.    Implementation  EN Composition: as above  Collaboration and Referral of Nutrition care: patient discussed in ICU rounds     Goals  EN to meet % estimated needs   Stool <500 mL/day       MONITORING AND EVALUATION:  Progress towards goals will be monitored and evaluated per protocol and Practice Guidelines      Christiane Horner RD, LD  Clinical Dietitian   ICU pager [phone]

## 2022-08-11 NOTE — PLAN OF CARE
"Pt only verbalizing a \"ouch\" intermittently with turns, otherwise remains nonverbal.   Pt follows commands intermittently. Good urine output. Transfer orders to 73 - awaiting a bed. Cont to monitor.         "

## 2022-08-12 ENCOUNTER — APPOINTMENT (OUTPATIENT)
Dept: OCCUPATIONAL THERAPY | Facility: CLINIC | Age: 77
DRG: 023 | End: 2022-08-12
Attending: INTERNAL MEDICINE
Payer: COMMERCIAL

## 2022-08-12 LAB
ANION GAP SERPL CALCULATED.3IONS-SCNC: 5 MMOL/L (ref 3–14)
BUN SERPL-MCNC: 12 MG/DL (ref 7–30)
CALCIUM SERPL-MCNC: 8.6 MG/DL (ref 8.5–10.1)
CHLORIDE BLD-SCNC: 104 MMOL/L (ref 94–109)
CO2 SERPL-SCNC: 28 MMOL/L (ref 20–32)
CREAT SERPL-MCNC: 0.37 MG/DL (ref 0.52–1.04)
CRP SERPL-MCNC: 51.8 MG/L (ref 0–8)
ERYTHROCYTE [DISTWIDTH] IN BLOOD BY AUTOMATED COUNT: 20.2 % (ref 10–15)
GFR SERPL CREATININE-BSD FRML MDRD: >90 ML/MIN/1.73M2
GLUCOSE BLD-MCNC: 195 MG/DL (ref 70–99)
GLUCOSE BLDC GLUCOMTR-MCNC: 156 MG/DL (ref 70–99)
GLUCOSE BLDC GLUCOMTR-MCNC: 164 MG/DL (ref 70–99)
GLUCOSE BLDC GLUCOMTR-MCNC: 172 MG/DL (ref 70–99)
GLUCOSE BLDC GLUCOMTR-MCNC: 184 MG/DL (ref 70–99)
GLUCOSE BLDC GLUCOMTR-MCNC: 209 MG/DL (ref 70–99)
GLUCOSE BLDC GLUCOMTR-MCNC: 225 MG/DL (ref 70–99)
GLUCOSE BLDC GLUCOMTR-MCNC: 231 MG/DL (ref 70–99)
HCT VFR BLD AUTO: 31 % (ref 35–47)
HGB BLD-MCNC: 9.1 G/DL (ref 11.7–15.7)
MCH RBC QN AUTO: 22 PG (ref 26.5–33)
MCHC RBC AUTO-ENTMCNC: 29.4 G/DL (ref 31.5–36.5)
MCV RBC AUTO: 75 FL (ref 78–100)
PLATELET # BLD AUTO: 588 10E3/UL (ref 150–450)
POTASSIUM BLD-SCNC: 3.6 MMOL/L (ref 3.4–5.3)
RBC # BLD AUTO: 4.14 10E6/UL (ref 3.8–5.2)
SODIUM SERPL-SCNC: 137 MMOL/L (ref 133–144)
WBC # BLD AUTO: 9.5 10E3/UL (ref 4–11)

## 2022-08-12 PROCEDURE — 250N000011 HC RX IP 250 OP 636: Performed by: HOSPITALIST

## 2022-08-12 PROCEDURE — 97110 THERAPEUTIC EXERCISES: CPT | Mod: GO | Performed by: OCCUPATIONAL THERAPIST

## 2022-08-12 PROCEDURE — 250N000013 HC RX MED GY IP 250 OP 250 PS 637: Performed by: HOSPITALIST

## 2022-08-12 PROCEDURE — 86140 C-REACTIVE PROTEIN: CPT | Performed by: SPECIALIST

## 2022-08-12 PROCEDURE — 99233 SBSQ HOSP IP/OBS HIGH 50: CPT | Performed by: HOSPITALIST

## 2022-08-12 PROCEDURE — 999N000190 HC STATISTIC VAT ROUNDS

## 2022-08-12 PROCEDURE — 258N000003 HC RX IP 258 OP 636: Performed by: HOSPITALIST

## 2022-08-12 PROCEDURE — 99233 SBSQ HOSP IP/OBS HIGH 50: CPT | Performed by: SPECIALIST

## 2022-08-12 PROCEDURE — 99024 POSTOP FOLLOW-UP VISIT: CPT | Performed by: PHYSICIAN ASSISTANT

## 2022-08-12 PROCEDURE — 80048 BASIC METABOLIC PNL TOTAL CA: CPT | Performed by: HOSPITALIST

## 2022-08-12 PROCEDURE — 85027 COMPLETE CBC AUTOMATED: CPT | Performed by: HOSPITALIST

## 2022-08-12 PROCEDURE — 200N000001 HC R&B ICU

## 2022-08-12 RX ADMIN — OXYCODONE HYDROCHLORIDE 5 MG: 5 TABLET ORAL at 21:46

## 2022-08-12 RX ADMIN — INSULIN ASPART 1 UNITS: 100 INJECTION, SOLUTION INTRAVENOUS; SUBCUTANEOUS at 04:50

## 2022-08-12 RX ADMIN — Medication 15 ML: at 08:13

## 2022-08-12 RX ADMIN — INSULIN ASPART 2 UNITS: 100 INJECTION, SOLUTION INTRAVENOUS; SUBCUTANEOUS at 19:45

## 2022-08-12 RX ADMIN — INSULIN ASPART 1 UNITS: 100 INJECTION, SOLUTION INTRAVENOUS; SUBCUTANEOUS at 23:49

## 2022-08-12 RX ADMIN — INSULIN ASPART 2 UNITS: 100 INJECTION, SOLUTION INTRAVENOUS; SUBCUTANEOUS at 00:16

## 2022-08-12 RX ADMIN — VANCOMYCIN HYDROCHLORIDE 125 MG: KIT at 12:51

## 2022-08-12 RX ADMIN — OXYCODONE HYDROCHLORIDE 5 MG: 5 TABLET ORAL at 06:31

## 2022-08-12 RX ADMIN — INSULIN ASPART 2 UNITS: 100 INJECTION, SOLUTION INTRAVENOUS; SUBCUTANEOUS at 08:19

## 2022-08-12 RX ADMIN — Medication 40 MG: at 08:13

## 2022-08-12 RX ADMIN — VANCOMYCIN HYDROCHLORIDE 125 MG: KIT at 21:46

## 2022-08-12 RX ADMIN — OXCARBAZEPINE 450 MG: 300 SUSPENSION ORAL at 21:46

## 2022-08-12 RX ADMIN — METRONIDAZOLE 500 MG: 500 INJECTION, SOLUTION INTRAVENOUS at 08:12

## 2022-08-12 RX ADMIN — ACETAMINOPHEN 650 MG: 325 SUSPENSION ORAL at 15:42

## 2022-08-12 RX ADMIN — CEFEPIME HYDROCHLORIDE 2 G: 2 INJECTION, POWDER, FOR SOLUTION INTRAVENOUS at 15:42

## 2022-08-12 RX ADMIN — CEFEPIME HYDROCHLORIDE 2 G: 2 INJECTION, POWDER, FOR SOLUTION INTRAVENOUS at 00:16

## 2022-08-12 RX ADMIN — VANCOMYCIN HYDROCHLORIDE 125 MG: KIT at 18:41

## 2022-08-12 RX ADMIN — OXYCODONE HYDROCHLORIDE 5 MG: 5 TABLET ORAL at 15:42

## 2022-08-12 RX ADMIN — LEVETIRACETAM 1000 MG: 10 INJECTION INTRAVENOUS at 10:45

## 2022-08-12 RX ADMIN — INSULIN ASPART 1 UNITS: 100 INJECTION, SOLUTION INTRAVENOUS; SUBCUTANEOUS at 12:51

## 2022-08-12 RX ADMIN — MEROPENEM 2 G: 1 INJECTION, POWDER, FOR SOLUTION INTRAVENOUS at 19:34

## 2022-08-12 RX ADMIN — INSULIN ASPART 1 UNITS: 100 INJECTION, SOLUTION INTRAVENOUS; SUBCUTANEOUS at 16:07

## 2022-08-12 RX ADMIN — LEVETIRACETAM 1000 MG: 10 INJECTION INTRAVENOUS at 21:47

## 2022-08-12 RX ADMIN — CEFEPIME HYDROCHLORIDE 2 G: 2 INJECTION, POWDER, FOR SOLUTION INTRAVENOUS at 08:12

## 2022-08-12 RX ADMIN — VANCOMYCIN HYDROCHLORIDE 125 MG: KIT at 08:13

## 2022-08-12 RX ADMIN — ACETAMINOPHEN 650 MG: 325 SUSPENSION ORAL at 06:31

## 2022-08-12 RX ADMIN — VANCOMYCIN HYDROCHLORIDE 1000 MG: 1 INJECTION, SOLUTION INTRAVENOUS at 21:46

## 2022-08-12 RX ADMIN — TOPIRAMATE 50 MG: 50 TABLET ORAL at 21:46

## 2022-08-12 RX ADMIN — METRONIDAZOLE 500 MG: 500 INJECTION, SOLUTION INTRAVENOUS at 00:16

## 2022-08-12 RX ADMIN — VANCOMYCIN HYDROCHLORIDE 1000 MG: 1 INJECTION, SOLUTION INTRAVENOUS at 10:45

## 2022-08-12 RX ADMIN — METRONIDAZOLE 500 MG: 500 INJECTION, SOLUTION INTRAVENOUS at 15:43

## 2022-08-12 ASSESSMENT — ACTIVITIES OF DAILY LIVING (ADL)
ADLS_ACUITY_SCORE: 46
ADLS_ACUITY_SCORE: 46
ADLS_ACUITY_SCORE: 44
ADLS_ACUITY_SCORE: 46
ADLS_ACUITY_SCORE: 44
ADLS_ACUITY_SCORE: 44
ADLS_ACUITY_SCORE: 46
ADLS_ACUITY_SCORE: 44
ADLS_ACUITY_SCORE: 46
ADLS_ACUITY_SCORE: 44
ADLS_ACUITY_SCORE: 46
ADLS_ACUITY_SCORE: 46

## 2022-08-12 NOTE — PLAN OF CARE
"Neuro: Patient remains alert to self, nonverbal, saying \"ouch\" when turned or when blood sugar check is done. PERRLA. Moves arms purposefully. Left more so than right. Legs weaker than arms. Right side weaker than left. Less spontaneous movement in legs.   CV: Sinus rhythm/Sinus tachy on monitor. BP stable.   Pulmonary: Lung sounds clear/diminished. SPO2>92% on room air.   GI/: Tube feeding infusing at goal. New tube feeding formula was ordered by dietary.   External catheter in use. Good output. Rectal tube device in use for loose stools. Moderate output this shift.  Lines/Gtt: R PICC x 3, very positional to be able to draw patient needs to be on hers left side, all three ports drawn. TKO x 2.  Family: Daughter updated over the phone.          "

## 2022-08-12 NOTE — PROGRESS NOTES
[facility] [place]    Medicine Progress Note - Hospitalist Service    Date of Admission:  7/27/2022    Assessment & Plan        Julisa Chaney is a 77-year-old woman with medical history which includes trigeminal neuralgia who was recently admitted to [facility] on 7/19 for severe sepsis(had leukocytosis, lactic acidosis and elevated procalcitonin)   due to suspected COVID-19 infection and suspected bacterial pneumonia. recent COVID-19 infection which was diagnosed on 7/19/2022 and was treated with 5 days of remdesivir and did not require any steroids and she was treated with IV vancomycin and IV meropenem.     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  She was switched to IV vancomycin, cefepime and metronidazole and transferred to [facility] for neurosurgery assessment. MRI brain w/wo contrast 7/30 showed ventriculitis with probable abscess. Neurosurgery/neurology/infectious disease/oncology consulted. Patient taken to the OR on 7/31 for left frontal ventriculostomy.      Severe sepsis secondary to ventriculitis with abscess-improving  Status left frontal ventriculostomy 7/31/22  -Imaging as noted above.  -Neurosurgery and oncology were consulted and flow cytometry was done which did not show any evidence of malignancy.  -Patient underwent left frontal ventriculostomy on 7/31/2022. Post-op management per neurosurgery team.  -CSF sent on 8/5 which revealed , , glucose 98 and protein 84. WBC this AM 25, . CSF 2cc for repeat labs. CSF with sediment.   -EVD was clamped on 8/6 and removed on 8/8.  -ID following.  -Currently on IV vancomycin, cefepime, and metronidazole. ID planning 6 weeks of treatment with  current regimen - unable to de-escalate as cultures have been negative.   -Daughter concerned about the cefepime. Patient apparently had similar symptoms of lethargy and confusion when on rocephin in February 2022 which improved after rocephin was discontinued. Unclear if rocephin was the etiology at that time, symptoms may have been due to her illness. Daughter would like to try a different antibiotic if possible. Discussed with ID, will stop cefepime and metronidazole and start meropenem.  -Likely plan for repeat imaging early next week.  -Transfer orders out of ICU entered on 8/9, awaiting open neuro bed.  -Had a fever on 8/9/22. Received PRBC transfusion the previous evening, but this was completed about 10 hours prior to the fever. Blood cultures ordered by ID, negative to date. Consider repeat imaging if recurrent fevers.  -Repeat labs in AM.  -Spoke with patient's daughter on 8/12/22 regarding concerns about overall prognosis and lack of progress with mental status. Plan to continue current cares as noted above, but will need to address goals of care again if no significant improvement in the near future.     Acute infectious encephalopathy due to above  Appeared to be improving for a few days, was starting to talk a little, but has not been verbal while I have been in the room the past several days.  -Continue to treat underlying issues as noted above.  -Reorient as needed.  -Maintain normal sleep/wake cycle as able.  -Avoid opioid pain medications as able.  -Concern about lack of improvement with mental status as noted above.     Sinus tachycardia  -Treat underlying issues as noted.  -Continue to monitor.     C. difficile colitis  Patient was noted to have copious diarrhea on 8/1 and sample came back positive for C. Difficile.  -Continue oral vancomycin.  -Rectal tube still in place, liquid stool output appears to be slowly improving.  -ID following.    Acute on chronic anemia  Prior labs show baseline  hemoglobin of about 9-10. Hemoglobin on admission was 11.7. Has slowly trended down, now low enough to recommend PRBC transfusion on 8/8/22. No obvious ongoing bleeding noted. WBC and platelets - normal-elevated. Iron panel on 7/30/22 showed that her total iron was low at 14, binding capacity was low at 28, iron saturation was 6 and B12 was high and folate was normal.  -Repeat hemoglobin 8/8 lower at 6.4, then 6.9 on recheck. Transfused one unit PRBCs on 8/8/22.  -Hemoglobin stable/improved at 9.1 on 8/12/22.  -She is iron deficient, received IV iron infusion on 8/11/22.  -Recheck labs in AM.    History of trigeminal neuralgia  -Continue PTA Trileptal and Topamax.     History of chronic pyloric ulcer  She had EGD done in 2020 which showed gastroduodenitis with a duodenal stricture.  -Continue IV PPI.    Hyperglycemia  Recent HbA1c was 5.6 on 7/19/22. Was not on any medication prior to admission.  -Blood sugars mildly elevated but overall fairly well controlled.  -Continue medium dose sliding scale insulin every 4 hours.  -Increased lantus to 10 units at bedtime starting 8/11/22, titrate as indicated.     Hypokalemia  Hyponatremia  Hypophosphatemia  -Recheck and replace per protocol.  -Sodium down to 132 on 8/11/22, decreased free water flushes to 75 ml every 4 hours.  -Sodium improved to 137 on 8/12/22.  -Recheck labs in AM.     Severe protein calorie malnutrition  -Dietitian consulted.  -Currently getting tube feedings and free water flushes via NG feeding tube.  -Will need SLP consult when more alert and able to participate.     Physical deconditioning  -Patient seems severely deconditioned.  -PT/OT consulted, although not really able to participate at this point due to her mental status.       Diet: Adult Formula Drip Feeding: Continuous Jevity 1.5; Nasogastric tube; Goal Rate: 50; mL/hr; Medication - Feeding Tube Flush Frequency: At least 15-30 mL water before and after medication administration and with tube  clogging; Amount to Send (Nutrit...    DVT Prophylaxis: Pneumatic Compression Devices  Abrams Catheter: Not present  Central Lines: PRESENT  PICC Triple Lumen 07/22/22 Right Basilic Vascular access-Site Assessment: WDL  Cardiac Monitoring: ACTIVE order. Indication: ICU  Code Status: Full Code      Disposition Plan      Expected Discharge Date: 08/15/2022        Discharge Comments: Needs NG feeding tube placed        The patient's care was discussed with the Bedside Nurse and Patient.    Elton Nguyen MD  Hospitalist Service  [facility]  Securely message with the Vocera Web Console (learn more here)  Text page via [employer] Paging/Directory         Clinically Significant Risk Factors Present on Admission                      ______________________________________________________________________    Interval History   Julisa Chaney was seen this afternoon. She opened her eyes to my voice, did not speak or follow commands. Unable to get any history from her. Discussed with nursing. Updated family as noted above.    Data reviewed today: I reviewed all medications, new labs and imaging results over the last 24 hours. I personally reviewed no images or EKG's today.    Physical Exam   Vital Signs: Temp: 98.9  F (37.2  C) Temp src: Axillary BP: 120/62 Pulse: 109   Resp: 30 SpO2: 99 % O2 Device: None (Room air)    Weight: 118 lbs 9.72 oz  Constitutional: lethargic, opened eyes to my voice, laying in the hospital bed  Respiratory: clear to auscultation bilaterally, no crackles or wheezing  Cardiovascular: regular rhythm, mildly tachycardic, normal S1 and S2, no murmur noted  GI: normal bowel sounds, soft, non-distended, non-tender, NG tube in place, rectal tube in place  Skin: warm, dry  Musculoskeletal: no lower extremity pitting edema present  Neurologic: lethargic, opened her eyes to my voice, did not speak or follow commands    Data   Recent Labs   Lab 08/12/22  1548 08/12/22  1251  08/12/22  0818 08/12/22  0516 08/11/22 0918 08/11/22  0714 08/10/22  0906 08/10/22  0417 08/09/22  0420 08/09/22  0418   WBC  --   --   --  9.5  --  10.0  --  9.8  --  10.1   HGB  --   --   --  9.1*  --  9.1*  --  8.6*  --  8.5*   MCV  --   --   --  75*  --  72*  --  75*  --  74*   PLT  --   --   --  588*  --  540*  --  491*  --  497*   NA  --   --   --  137  --  132*  --  135  --  135   POTASSIUM  --   --   --  3.6  --  4.4  --  3.8  --  3.8   CHLORIDE  --   --   --  104  --  98  --  101  --  103   CO2  --   --   --  28  --  28  --  28  --  28   BUN  --   --   --  12  --  11  --  12  --  10   CR  --   --   --  0.37*  --  0.34*  --  0.32*  --  0.32*   ANIONGAP  --   --   --  5  --  6  --  6  --  4   ADY  --   --   --  8.6  --  8.5  --  8.7  --  8.6   * 172* 225* 195*   < > 160*   < > 243*   < > 181*   ALBUMIN  --   --   --   --   --   --   --   --   --  1.9*   PROTTOTAL  --   --   --   --   --   --   --   --   --  5.8*   BILITOTAL  --   --   --   --   --   --   --   --   --  0.4   ALKPHOS  --   --   --   --   --   --   --   --   --  72   ALT  --   --   --   --   --   --   --   --   --  9   AST  --   --   --   --   --   --   --   --   --  6    < > = values in this interval not displayed.     Medications     - MEDICATION INSTRUCTIONS -         ceFEPIme (MAXIPIME) IV  2 g Intravenous Q8H     insulin aspart  1-6 Units Subcutaneous Q4H     insulin glargine  10 Units Subcutaneous At Bedtime     iron sucrose  300 mg Intravenous Q48H     levETIRAcetam  1,000 mg Intravenous Q12H     metroNIDAZOLE  500 mg Intravenous Q8H     multivitamins w/minerals  15 mL Per Feeding Tube Daily     OXcarbazepine  450 mg Oral or Feeding Tube At Bedtime     pantoprazole  40 mg Oral or Feeding Tube QAM AC     sodium chloride (PF)  10-40 mL Intracatheter Q7 Days     topiramate  50 mg Oral or Feeding Tube At Bedtime     vancomycin  125 mg Oral or Feeding Tube 4x Daily     vancomycin  1,000 mg Intravenous Q12H

## 2022-08-12 NOTE — PROGRESS NOTES
Neurosurgery progress note    Postoperative day 12 status post left frontal ventriculostomy placement  4 days status post ventriculostomy removal    Patient is lying in bed, opens eyes to voice.    Exam    Incision clean dry and intact with staples in place  EVD exit site dry with sutures in place    Plan    Remove staples postoperative day 14  Care as per hospitalist and infectious disease

## 2022-08-12 NOTE — PLAN OF CARE
Physical Therapy: Orders received. Chart reviewed.? Physical Therapy not indicated at this time due to pt with limited participation at this time. Discussed with OT who will continue to see pt and involve PT when pt able to participate and progress with both disciplines involved.  Defer discharge recommendations to OT.? Will complete PTorder.

## 2022-08-13 LAB
ANION GAP SERPL CALCULATED.3IONS-SCNC: 4 MMOL/L (ref 3–14)
BUN SERPL-MCNC: 13 MG/DL (ref 7–30)
CALCIUM SERPL-MCNC: 8.8 MG/DL (ref 8.5–10.1)
CHLORIDE BLD-SCNC: 104 MMOL/L (ref 94–109)
CO2 SERPL-SCNC: 28 MMOL/L (ref 20–32)
CREAT SERPL-MCNC: 0.44 MG/DL (ref 0.52–1.04)
ERYTHROCYTE [DISTWIDTH] IN BLOOD BY AUTOMATED COUNT: 21.1 % (ref 10–15)
GFR SERPL CREATININE-BSD FRML MDRD: >90 ML/MIN/1.73M2
GLUCOSE BLD-MCNC: 166 MG/DL (ref 70–99)
GLUCOSE BLDC GLUCOMTR-MCNC: 129 MG/DL (ref 70–99)
GLUCOSE BLDC GLUCOMTR-MCNC: 129 MG/DL (ref 70–99)
GLUCOSE BLDC GLUCOMTR-MCNC: 155 MG/DL (ref 70–99)
GLUCOSE BLDC GLUCOMTR-MCNC: 180 MG/DL (ref 70–99)
GLUCOSE BLDC GLUCOMTR-MCNC: 196 MG/DL (ref 70–99)
HCT VFR BLD AUTO: 30 % (ref 35–47)
HGB BLD-MCNC: 8.5 G/DL (ref 11.7–15.7)
MCH RBC QN AUTO: 21.7 PG (ref 26.5–33)
MCHC RBC AUTO-ENTMCNC: 28.3 G/DL (ref 31.5–36.5)
MCV RBC AUTO: 77 FL (ref 78–100)
PHOSPHATE SERPL-MCNC: 2.6 MG/DL (ref 2.5–4.5)
PLATELET # BLD AUTO: 560 10E3/UL (ref 150–450)
POTASSIUM BLD-SCNC: 3.5 MMOL/L (ref 3.4–5.3)
RBC # BLD AUTO: 3.92 10E6/UL (ref 3.8–5.2)
SODIUM SERPL-SCNC: 136 MMOL/L (ref 133–144)
WBC # BLD AUTO: 8.4 10E3/UL (ref 4–11)

## 2022-08-13 PROCEDURE — 200N000001 HC R&B ICU

## 2022-08-13 PROCEDURE — 258N000003 HC RX IP 258 OP 636: Performed by: HOSPITALIST

## 2022-08-13 PROCEDURE — 250N000011 HC RX IP 250 OP 636: Performed by: HOSPITALIST

## 2022-08-13 PROCEDURE — 250N000013 HC RX MED GY IP 250 OP 250 PS 637: Performed by: HOSPITALIST

## 2022-08-13 PROCEDURE — 999N000190 HC STATISTIC VAT ROUNDS

## 2022-08-13 PROCEDURE — 84100 ASSAY OF PHOSPHORUS: CPT | Performed by: INTERNAL MEDICINE

## 2022-08-13 PROCEDURE — 36415 COLL VENOUS BLD VENIPUNCTURE: CPT | Performed by: INTERNAL MEDICINE

## 2022-08-13 PROCEDURE — 99233 SBSQ HOSP IP/OBS HIGH 50: CPT | Performed by: INTERNAL MEDICINE

## 2022-08-13 PROCEDURE — 250N000011 HC RX IP 250 OP 636: Performed by: INTERNAL MEDICINE

## 2022-08-13 PROCEDURE — 258N000003 HC RX IP 258 OP 636: Performed by: INTERNAL MEDICINE

## 2022-08-13 PROCEDURE — 85027 COMPLETE CBC AUTOMATED: CPT | Performed by: INTERNAL MEDICINE

## 2022-08-13 PROCEDURE — 80048 BASIC METABOLIC PNL TOTAL CA: CPT | Performed by: INTERNAL MEDICINE

## 2022-08-13 RX ADMIN — MEROPENEM 2 G: 1 INJECTION, POWDER, FOR SOLUTION INTRAVENOUS at 20:05

## 2022-08-13 RX ADMIN — TOPIRAMATE 50 MG: 50 TABLET ORAL at 21:24

## 2022-08-13 RX ADMIN — INSULIN ASPART 2 UNITS: 100 INJECTION, SOLUTION INTRAVENOUS; SUBCUTANEOUS at 20:05

## 2022-08-13 RX ADMIN — Medication 40 MG: at 07:57

## 2022-08-13 RX ADMIN — MEROPENEM 2 G: 1 INJECTION, POWDER, FOR SOLUTION INTRAVENOUS at 04:19

## 2022-08-13 RX ADMIN — IRON SUCROSE 300 MG: 20 INJECTION, SOLUTION INTRAVENOUS at 07:50

## 2022-08-13 RX ADMIN — ACETAMINOPHEN 650 MG: 325 SUSPENSION ORAL at 22:40

## 2022-08-13 RX ADMIN — LEVETIRACETAM 1000 MG: 10 INJECTION INTRAVENOUS at 21:13

## 2022-08-13 RX ADMIN — VANCOMYCIN HYDROCHLORIDE 1000 MG: 1 INJECTION, SOLUTION INTRAVENOUS at 10:57

## 2022-08-13 RX ADMIN — VANCOMYCIN HYDROCHLORIDE 125 MG: KIT at 21:24

## 2022-08-13 RX ADMIN — Medication 15 ML: at 08:00

## 2022-08-13 RX ADMIN — VANCOMYCIN HYDROCHLORIDE 1000 MG: 1 INJECTION, SOLUTION INTRAVENOUS at 21:24

## 2022-08-13 RX ADMIN — LEVETIRACETAM 1000 MG: 10 INJECTION INTRAVENOUS at 10:47

## 2022-08-13 RX ADMIN — VANCOMYCIN HYDROCHLORIDE 125 MG: KIT at 08:00

## 2022-08-13 RX ADMIN — ACETAMINOPHEN 650 MG: 325 SUSPENSION ORAL at 07:49

## 2022-08-13 RX ADMIN — MEROPENEM 2 G: 1 INJECTION, POWDER, FOR SOLUTION INTRAVENOUS at 12:47

## 2022-08-13 RX ADMIN — VANCOMYCIN HYDROCHLORIDE 125 MG: KIT at 12:48

## 2022-08-13 RX ADMIN — OXYCODONE HYDROCHLORIDE 5 MG: 5 TABLET ORAL at 07:49

## 2022-08-13 RX ADMIN — INSULIN ASPART 1 UNITS: 100 INJECTION, SOLUTION INTRAVENOUS; SUBCUTANEOUS at 11:07

## 2022-08-13 RX ADMIN — OXCARBAZEPINE 450 MG: 300 SUSPENSION ORAL at 21:24

## 2022-08-13 RX ADMIN — VANCOMYCIN HYDROCHLORIDE 125 MG: KIT at 16:52

## 2022-08-13 RX ADMIN — INSULIN ASPART 1 UNITS: 100 INJECTION, SOLUTION INTRAVENOUS; SUBCUTANEOUS at 16:51

## 2022-08-13 ASSESSMENT — ACTIVITIES OF DAILY LIVING (ADL)
ADLS_ACUITY_SCORE: 44
ADLS_ACUITY_SCORE: 46
ADLS_ACUITY_SCORE: 44
ADLS_ACUITY_SCORE: 46
ADLS_ACUITY_SCORE: 44
ADLS_ACUITY_SCORE: 44

## 2022-08-13 NOTE — PROGRESS NOTES
Patient alert and tracking RN when in room, difficult to tell if following commands, as most she does not follow, but will leave arm in position if placed, and will squeeze with left hand. Able to move all extremities, left side stronger. Purewick in place, having some leakage, rectal tube in place, still with loose stools, no leaking. Tolerating tube feeds, vital signs WNL, slightly tachycardic at times, given oxy once for pain.

## 2022-08-13 NOTE — PROGRESS NOTES
"The patient is more alert this evening. She is responding verbally (saying \"yes\" or \"what\" when I say her name) and she is following commands (wiggles toes, raises left arm off bed to command). PERRLA. Eyes track me around the room appropriately. Left arm and leg are stronger than the right arm/leg.   Vitals stable on room air.  Lung sounds diminished. No coughing. Got patient up to chair for 2 hours this afternoon.   Tube feeding infusing at goal. Blood sugar checked every 4 hours. Sliding scale insulin protocol.   Pure wick external catheter in use. Device becomes displaced frequently. Multiple episodes of urinary incontinence.  Rectal tube remains in place for loose stools. Some rash in khushbu-area. Barrier cream applied with each khushbu-cares.   Ebenezer Gallardo RN 6:38 PM 08/13/22       "

## 2022-08-13 NOTE — PROGRESS NOTES
[facility]    Neurosurgery  Daily Post-Op Note    Assessment & Plan   Procedure(s):  LEFT FRONTAL VENTRICULOSTOMY   13 Days Post-Op  Arouses to voice, not following commands  LAINEZ spontaneously    Plan:  -Advance activity as tolerated  -Continue supportive and symptomatic treatment  -defer to primary services    Chucho Balderas PA-C    Interval History   Stable.      Physical Exam   Temp: 98.5  F (36.9  C) Temp src: Oral BP: 107/61 Pulse: 85   Resp: 27 SpO2: 96 % O2 Device: None (Room air)    Vitals:    08/11/22 0600 08/12/22 0600 08/13/22 0600   Weight: 55 kg (121 lb 4.1 oz) 53.8 kg (118 lb 9.7 oz) 59.6 kg (131 lb 6.3 oz)     Vital Signs with Ranges  Temp:  [98.2  F (36.8  C)-98.8  F (37.1  C)] 98.5  F (36.9  C)  Pulse:  [] 85  Resp:  [13-45] 27  BP: ()/(46-83) 107/61  SpO2:  [95 %-100 %] 96 %  I/O last 3 completed shifts:  In: 2850 [I.V.:1200; NG/GT:450]  Out: 1050 [Urine:850; Stool:200]    Alerts to voice, withdraws to pain, not following commands  Moves all extremities equally.    Incision: CDI      Medications     - MEDICATION INSTRUCTIONS -          insulin aspart  1-6 Units Subcutaneous Q4H     insulin glargine  10 Units Subcutaneous At Bedtime     iron sucrose  300 mg Intravenous Q48H     levETIRAcetam  1,000 mg Intravenous Q12H     meropenem  2 g Intravenous Q8H     multivitamins w/minerals  15 mL Per Feeding Tube Daily     OXcarbazepine  450 mg Oral or Feeding Tube At Bedtime     pantoprazole  40 mg Oral or Feeding Tube QAM AC     sodium chloride (PF)  10-40 mL Intracatheter Q7 Days     topiramate  50 mg Oral or Feeding Tube At Bedtime     vancomycin  125 mg Oral or Feeding Tube 4x Daily     vancomycin  1,000 mg Intravenous Q12H           Chucho Balderas PA-C  [facility]  [facility]  [address] [place] [address]  [address]  [place] [address]    Tel [phone]  Pager [phone]

## 2022-08-13 NOTE — PROGRESS NOTES
[facility]  Hospitalist Progress Note  Name: Julisa Chaney    MRN: [id number]  Physician:  Bismark Temple DO, FHM (Text Page)  Securely message with the Vocera Web Console (learn more here)    Summary of Stay:  Julisa Chaney is a 77-year-old woman with medical history which includes trigeminal neuralgia who was recently admitted to [facility] on 7/19 for severe sepsis(had leukocytosis, lactic acidosis and elevated procalcitonin) due to COVID-19 infection and suspected bacterial pneumonia. Recent COVID-19 infection which was diagnosed on 7/19/2022 and was treated with 5 days of remdesivir and did not require any steroids and she was treated with IV vancomycin and IV meropenem.     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  She was switched to IV vancomycin, cefepime and metronidazole and transferred to [facility] for neurosurgery assessment. MRI brain w/wo contrast 7/30 showed ventriculitis with probable abscess. Neurosurgery/neurology/infectious disease/oncology consulted. Patient taken to the OR on 7/31 for left frontal ventriculostomy.   Since then has remained on broad spectrum antibiotics.  In addition she has developed C. Difficile colitis and is on treatment.  She has continued to have a significant encephalopathy.    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  -Neurosurgery and oncology were consulted and flow cytometry was done which did not show any evidence of malignancy.  -Patient underwent left frontal ventriculostomy on 7/31/2022. Post-op management per  neurosurgery team.  -CSF sent on 8/5 which revealed , , glucose 98 and protein 84. WBC this AM 25, . CSF 2cc for repeat labs. CSF with sediment. EVD was clamped on 8/6 and removed on 8/8.  -Was on IV vancomycin, cefepime, and metronidazole. ID planning 6 weeks of treatment with current regimen - unable to de-escalate as cultures have been negative.  Daughter concerned about the cefepime. Patient apparently had similar symptoms of lethargy and confusion when on rocephin in February 2022 which improved after rocephin was discontinued. Unclear if rocephin was the etiology at that time, symptoms may have been due to her illness. Daughter would like to try a different antibiotic if possible. Following this ID stopped cefepime and metronidazole and started meropenem.  Also remained on vancomycin.    8/13 Update:  -  Continue meropenem, vancomycin IV.  Plan for repeat brain imaging Monday.  -  Attempted to update patients daughter today but have not yet been able to reach.  My colleague spoke with her on 8/12/22 regarding concerns about overall prognosis and lack of progress with mental status. Plan to continue current treatments as noted above with repeat imaging/reassessment Monday.  If not making more improvement and depending on imaging reassessment will likely need further goals of care discussion.      Acute infectious encephalopathy due to above:  Appeared to become more alert but has not really made progress beyond that now for a few days.  She continues to rarely speak and doesn't consistently follow commands.    -Continue to treat underlying issues as noted above.    -Maintain normal sleep/wake cycle as able.  -Avoid opioid pain medications as able.  -Concern about lack of improvement with mental status as noted above, re-image on Monday.     C. difficile colitis:  Patient was noted to have copious diarrhea on 8/1 and sample came back positive for C. Difficile.  -Continue oral vancomycin.   Will need a prolonged treatment given other abx use.  -Rectal tube still in place, liquid stool output appeared to improve but then had more overnight.  If stabilizes today will try to get rectal tube out by tomorrow.  -ID following as noted above.     Acute on chronic anemia:  Prior labs show baseline hemoglobin of about 9-10. Hemoglobin on admission was 11.7. Has slowly trended down, now low enough to recommend PRBC transfusion on 8/8/22. No obvious ongoing bleeding noted. WBC and platelets - normal-elevated. Iron panel on 7/30/22 showed that her total iron was low at 14, binding capacity was low at 28, iron saturation was 6 and B12 was high and folate was normal.  -Repeat hemoglobin 8/8 lower at 6.4, then 6.9 on recheck. Transfused one unit PRBCs on 8/8/22.  -Hemoglobin stable/improved at 9.1 on 8/12/22.  She is iron deficient, received IV iron infusion on 8/11/22.  -Continue to monitor, hgb stable above 8.  Recheck tomorrow.     Sinus tachycardia:  -  Overall improved, still mildly around 100 at times.  Treat underlying issues as noted.  Continue to monitor.    History of trigeminal neuralgia  -Continue PTA Trileptal and Topamax.     History of chronic pyloric ulcer  She had EGD done in 2020 which showed gastroduodenitis with a duodenal stricture.  -Continue IV PPI.     Hyperglycemia  Recent HbA1c was 5.6 on 7/19/22. Was not on any medication prior to admission.  -Blood sugars mildly elevated but overall fairly well controlled.  -Continue medium dose sliding scale insulin every 4 hours.  -Lantus to 10 units at bedtime starting 8/11/22, will continue at that dose for now.     Intermittent Hypokalemia, Hyponatremia, Hypophosphatemia:  -Recheck and replace per protocol.  -Sodium down to 132 on 8/11/22, decreased free water flushes to 75 ml every 4 hours and now improved to 136 on 8/13/22.  -Recheck labs in AM.     Severe protein calorie malnutrition:  -Dietitian consulted.  Currently getting tube feedings and free  water flushes via NG feeding tube.  -Will need SLP consult when more able to participate.  If not making more neuro progress by Monday/Tuesday consider as part of goals of care discussions longer term FT placement.     Physical deconditioning:  -Patient seems severely deconditioned.  -PT/OT as able, not really able to substantially participate at this point due to her mental status.           COVID Status:  COVID-19 PCR Results    COVID-19 PCR Results 8/8/20 2/7/22 7/19/22   SARS-CoV-2 Virus Specimen Source Nasopharyngeal     SARS-CoV-2 PCR Result NEGATIVE     SARS CoV2 PCR  Negative Positive (A)   (A) Abnormal value       Comments are available for some flowsheets but are not being displayed.         COVID-19 Antibody Results, Testing for Immunity    COVID-19 Antibody Results, Testing for Immunity   No data to display.            Diet: Adult Formula Drip Feeding: Continuous Jevity 1.5; Nasogastric tube; Goal Rate: 50; mL/hr; Medication - Feeding Tube Flush Frequency: At least 15-30 mL water before and after medication administration and with tube clogging; Amount to Send (Nutrit...    DVT Prophylaxis: Pneumatic Compression Devices  Abrams Catheter: Not present    Cardiac Monitoring: ACTIVE order. Indication: ICU    Code Status: Full Code        Disposition Plan   Needs ongoing ICU care.  If stable into Monday and repeat imaging stable consider moving out of ICU.     Entered: Bismark Temple DO 08/13/2022, 3:46 PM       Interval History   Attempted questions but she did not answer them.  RN reports stooling increased overnight but seems better during day today.      -Data reviewed today: I reviewed all new labs and imaging reports over the last 24 hours. I personally reviewed no images or EKG's today.    Physical Exam   Temp: 98.5  F (36.9  C) Temp src: Oral BP: 124/72 Pulse: 105   Resp: 27 O2 sats ranges reviewed. O2 Device: None (Room air)    Vitals:    08/11/22 0600 08/12/22 0600 08/13/22 0600   Weight: 55 kg  (121 lb 4.1 oz) 53.8 kg (118 lb 9.7 oz) 59.6 kg (131 lb 6.3 oz)     Vital Signs with Ranges  Temp:  [98.2  F (36.8  C)-98.8  F (37.1  C)] 98.5  F (36.9  C)  Pulse:  [] 105  Resp:  [13-45] 27  BP: ()/(46-83) 124/72  SpO2:  [84 %-100 %] 84 %  I/O last 3 completed shifts:  In: 2475 [I.V.:1100; NG/GT:375]  Out: 1050 [Urine:850; Stool:200]    GEN:  Alert, seems to look at me with talking to her but does not follow commands or answer my questions.  HEENT:  No scleral icterus, no nasal discharge, mouth moist.  CV:  Regular rate and rhythm, distant.  No rub.  LUNGS:  Clear to auscultation upper, faint coarse sounds more toward bases.  No wheezes/retractions.  Symmetric chest rise on inhalation noted.  ABD:  Active bowel sounds, soft, non-tender/non-distended.  No rebound/guarding/rigidity.  EXT:  Trace edema.  No cyanosis.  No acute joint synovitis noted.    SKIN:  Dry to touch, no exanthems noted in the visualized areas.  NEURO:  Doesn't consistently follow commands or answer questions consistently.  Does spontaneously move extremities.  Sensation to touch appears grossly intact.    Medications     - MEDICATION INSTRUCTIONS -         insulin aspart  1-6 Units Subcutaneous Q4H     insulin glargine  10 Units Subcutaneous At Bedtime     iron sucrose  300 mg Intravenous Q48H     levETIRAcetam  1,000 mg Intravenous Q12H     meropenem  2 g Intravenous Q8H     multivitamins w/minerals  15 mL Per Feeding Tube Daily     OXcarbazepine  450 mg Oral or Feeding Tube At Bedtime     pantoprazole  40 mg Oral or Feeding Tube QAM AC     sodium chloride (PF)  10-40 mL Intracatheter Q7 Days     topiramate  50 mg Oral or Feeding Tube At Bedtime     vancomycin  125 mg Oral or Feeding Tube 4x Daily     vancomycin  1,000 mg Intravenous Q12H     Data     Recent Labs   Lab 08/13/22  0558 08/12/22  0516 08/11/22  0714   WBC 8.4 9.5 10.0   HGB 8.5* 9.1* 9.1*   HCT 30.0* 31.0* 30.2*   MCV 77* 75* 72*   * 588* 540*     Recent Labs    Lab 08/13/22  1106 08/13/22  0759 08/13/22  0558 08/12/22  0818 08/12/22  0516 08/11/22  0918 08/11/22  0714 08/10/22  0906 08/10/22  0417 08/09/22  0420 08/09/22  0418 08/08/22  0825 08/08/22  0610 08/07/22  1145 08/07/22  0740   NA  --   --  136  --  137  --  132*  --  135  --  135  --  134  --   --    POTASSIUM  --   --  3.5  --  3.6  --  4.4  --  3.8  --  3.8  --  4.0  --   --    CHLORIDE  --   --  104  --  104  --  98  --  101  --  103  --  102  --   --    CO2  --   --  28  --  28  --  28  --  28  --  28  --  27  --   --    ANIONGAP  --   --  4  --  5  --  6  --  6  --  4  --  5  --   --    * 129* 166*   < > 195*   < > 160*   < > 243*   < > 181*   < > 139*   < >  --    BUN  --   --  13  --  12  --  11  --  12  --  10  --  12  --   --    CR  --   --  0.44*  --  0.37*  --  0.34*  --  0.32*  --  0.32*  --  0.36*  --   --    GFRESTIMATED  --   --  >90  --  >90  --  >90  --  >90  --  >90  --  >90  --   --    ADY  --   --  8.8  --  8.6  --  8.5  --  8.7  --  8.6  --  8.2*  --   --    MAG  --   --   --   --   --   --   --   --   --   --   --   --  2.2  --  2.3   PHOS  --   --  2.6  --   --   --  3.1  --  2.4*  --  2.2*  --  2.3*  --   --    PROTTOTAL  --   --   --   --   --   --   --   --   --   --  5.8*  --   --   --   --    ALBUMIN  --   --   --   --   --   --   --   --   --   --  1.9*  --   --   --   --    BILITOTAL  --   --   --   --   --   --   --   --   --   --  0.4  --   --   --   --    ALKPHOS  --   --   --   --   --   --   --   --   --   --  72  --   --   --   --    AST  --   --   --   --   --   --   --   --   --   --  6  --   --   --   --    ALT  --   --   --   --   --   --   --   --   --   --  9  --   --   --   --     < > = values in this interval not displayed.       No results found for this or any previous visit (from the past 24 hour(s)).

## 2022-08-13 NOTE — PROGRESS NOTES
Patient alert and tracking with her eyes. She squeezes her left hand, but I feel this may be reflexive as she will not release her grasp to command. Left arm and leg are strong and move spontaneously. Right extremities are weaker. Withdraw to pain. Right arm occasionally moves purposefully. PERRLA.   Lung sounds clear/diminished.   BP stable. Sinus rhythm/Sinus tachycardia on monitor.   Tube feeding infusing at goal. Blood sugar monitored.   Purewick external catheter in use. Device doesn't catch all the urine, so frequent pad changes were done with turning/repositioning. Rectal tube in use for loose stools. Less output today than yesterday.   Ebenezer Gallardo RN 7:28 PM 08/12/22

## 2022-08-14 LAB
ANION GAP SERPL CALCULATED.3IONS-SCNC: 6 MMOL/L (ref 3–14)
BACTERIA BLD CULT: NO GROWTH
BACTERIA BLD CULT: NO GROWTH
BUN SERPL-MCNC: 12 MG/DL (ref 7–30)
CALCIUM SERPL-MCNC: 8.9 MG/DL (ref 8.5–10.1)
CHLORIDE BLD-SCNC: 106 MMOL/L (ref 94–109)
CO2 SERPL-SCNC: 28 MMOL/L (ref 20–32)
CREAT SERPL-MCNC: 0.44 MG/DL (ref 0.52–1.04)
GFR SERPL CREATININE-BSD FRML MDRD: >90 ML/MIN/1.73M2
GLUCOSE BLD-MCNC: 145 MG/DL (ref 70–99)
GLUCOSE BLDC GLUCOMTR-MCNC: 158 MG/DL (ref 70–99)
GLUCOSE BLDC GLUCOMTR-MCNC: 163 MG/DL (ref 70–99)
GLUCOSE BLDC GLUCOMTR-MCNC: 170 MG/DL (ref 70–99)
GLUCOSE BLDC GLUCOMTR-MCNC: 173 MG/DL (ref 70–99)
GLUCOSE BLDC GLUCOMTR-MCNC: 186 MG/DL (ref 70–99)
GLUCOSE BLDC GLUCOMTR-MCNC: 201 MG/DL (ref 70–99)
PHOSPHATE SERPL-MCNC: 2.2 MG/DL (ref 2.5–4.5)
POTASSIUM BLD-SCNC: 3.5 MMOL/L (ref 3.4–5.3)
POTASSIUM BLD-SCNC: 3.5 MMOL/L (ref 3.4–5.3)
SODIUM SERPL-SCNC: 140 MMOL/L (ref 133–144)
VANCOMYCIN SERPL-MCNC: 20.7 MG/L

## 2022-08-14 PROCEDURE — 250N000011 HC RX IP 250 OP 636: Performed by: HOSPITALIST

## 2022-08-14 PROCEDURE — 250N000013 HC RX MED GY IP 250 OP 250 PS 637: Performed by: HOSPITALIST

## 2022-08-14 PROCEDURE — 80048 BASIC METABOLIC PNL TOTAL CA: CPT | Performed by: INTERNAL MEDICINE

## 2022-08-14 PROCEDURE — 258N000003 HC RX IP 258 OP 636: Performed by: HOSPITALIST

## 2022-08-14 PROCEDURE — 36415 COLL VENOUS BLD VENIPUNCTURE: CPT | Performed by: INTERNAL MEDICINE

## 2022-08-14 PROCEDURE — 80202 ASSAY OF VANCOMYCIN: CPT | Performed by: INTERNAL MEDICINE

## 2022-08-14 PROCEDURE — 99233 SBSQ HOSP IP/OBS HIGH 50: CPT | Performed by: INTERNAL MEDICINE

## 2022-08-14 PROCEDURE — 200N000001 HC R&B ICU

## 2022-08-14 PROCEDURE — 250N000013 HC RX MED GY IP 250 OP 250 PS 637: Performed by: INTERNAL MEDICINE

## 2022-08-14 PROCEDURE — 999N000190 HC STATISTIC VAT ROUNDS

## 2022-08-14 PROCEDURE — 84132 ASSAY OF SERUM POTASSIUM: CPT | Performed by: INTERNAL MEDICINE

## 2022-08-14 PROCEDURE — 84100 ASSAY OF PHOSPHORUS: CPT | Performed by: INTERNAL MEDICINE

## 2022-08-14 RX ADMIN — Medication 40 MG: at 08:06

## 2022-08-14 RX ADMIN — OXYCODONE HYDROCHLORIDE 5 MG: 5 TABLET ORAL at 04:50

## 2022-08-14 RX ADMIN — INSULIN ASPART 1 UNITS: 100 INJECTION, SOLUTION INTRAVENOUS; SUBCUTANEOUS at 00:32

## 2022-08-14 RX ADMIN — INSULIN ASPART 1 UNITS: 100 INJECTION, SOLUTION INTRAVENOUS; SUBCUTANEOUS at 04:27

## 2022-08-14 RX ADMIN — MEROPENEM 2 G: 1 INJECTION, POWDER, FOR SOLUTION INTRAVENOUS at 04:11

## 2022-08-14 RX ADMIN — VANCOMYCIN HYDROCHLORIDE 125 MG: KIT at 22:10

## 2022-08-14 RX ADMIN — OXCARBAZEPINE 450 MG: 300 SUSPENSION ORAL at 22:10

## 2022-08-14 RX ADMIN — POTASSIUM & SODIUM PHOSPHATES POWDER PACK 280-160-250 MG 1 PACKET: 280-160-250 PACK at 17:06

## 2022-08-14 RX ADMIN — INSULIN ASPART 1 UNITS: 100 INJECTION, SOLUTION INTRAVENOUS; SUBCUTANEOUS at 19:54

## 2022-08-14 RX ADMIN — VANCOMYCIN HYDROCHLORIDE 1000 MG: 1 INJECTION, SOLUTION INTRAVENOUS at 22:08

## 2022-08-14 RX ADMIN — INSULIN ASPART 1 UNITS: 100 INJECTION, SOLUTION INTRAVENOUS; SUBCUTANEOUS at 17:09

## 2022-08-14 RX ADMIN — INSULIN ASPART 1 UNITS: 100 INJECTION, SOLUTION INTRAVENOUS; SUBCUTANEOUS at 08:09

## 2022-08-14 RX ADMIN — LEVETIRACETAM 1000 MG: 10 INJECTION INTRAVENOUS at 11:06

## 2022-08-14 RX ADMIN — MEROPENEM 2 G: 1 INJECTION, POWDER, FOR SOLUTION INTRAVENOUS at 19:48

## 2022-08-14 RX ADMIN — POTASSIUM & SODIUM PHOSPHATES POWDER PACK 280-160-250 MG 1 PACKET: 280-160-250 PACK at 08:05

## 2022-08-14 RX ADMIN — Medication 15 ML: at 08:06

## 2022-08-14 RX ADMIN — TOPIRAMATE 50 MG: 50 TABLET ORAL at 22:18

## 2022-08-14 RX ADMIN — MEROPENEM 2 G: 1 INJECTION, POWDER, FOR SOLUTION INTRAVENOUS at 12:47

## 2022-08-14 RX ADMIN — ACETAMINOPHEN 650 MG: 325 SUSPENSION ORAL at 08:06

## 2022-08-14 RX ADMIN — VANCOMYCIN HYDROCHLORIDE 1000 MG: 1 INJECTION, SOLUTION INTRAVENOUS at 11:06

## 2022-08-14 RX ADMIN — POTASSIUM & SODIUM PHOSPHATES POWDER PACK 280-160-250 MG 1 PACKET: 280-160-250 PACK at 11:06

## 2022-08-14 RX ADMIN — VANCOMYCIN HYDROCHLORIDE 125 MG: KIT at 17:30

## 2022-08-14 RX ADMIN — LEVETIRACETAM 1000 MG: 10 INJECTION INTRAVENOUS at 22:10

## 2022-08-14 RX ADMIN — VANCOMYCIN HYDROCHLORIDE 125 MG: KIT at 08:06

## 2022-08-14 RX ADMIN — ACETAMINOPHEN 650 MG: 325 SUSPENSION ORAL at 20:14

## 2022-08-14 RX ADMIN — VANCOMYCIN HYDROCHLORIDE 125 MG: KIT at 12:48

## 2022-08-14 RX ADMIN — INSULIN ASPART 2 UNITS: 100 INJECTION, SOLUTION INTRAVENOUS; SUBCUTANEOUS at 12:52

## 2022-08-14 ASSESSMENT — ACTIVITIES OF DAILY LIVING (ADL)
ADLS_ACUITY_SCORE: 44

## 2022-08-14 NOTE — PROGRESS NOTES
No major events overnight. Patient lethargic at times but able to follow commands inconsistently. Moves all extremities R side weaker than L side. Pt able verbalize and answer very basic questions inconsistently with a yes or no. C/o pain noted toward end of shift. 5mg oxy given. Pt continues to have a moderate amount of urine and liquid stool out requiring an external cath and rectal tube. Rash to khushbu area protected by frequent application of barrier cream with each incontinence care.

## 2022-08-14 NOTE — PHARMACY-VANCOMYCIN DOSING SERVICE
Pharmacy Vancomycin Note  Date of Service 2022  Patient's  1945   77 year old, female    Indication: Abscess  Day of Therapy: 25  Current vancomycin regimen:  1000 mg IV q12h  Current vancomycin monitoring method: Trough (Method 1 = dosing nomogram)  Current vancomycin therapeutic monitoring goal: 15-20 mg/L      Current estimated CrCl = Estimated Creatinine Clearance: 94.2 mL/min (A) (based on SCr of 0.44 mg/dL (L)).    Creatinine for last 3 days  2022:  5:16 AM Creatinine 0.37 mg/dL  2022:  5:58 AM Creatinine 0.44 mg/dL  2022:  5:02 AM Creatinine 0.44 mg/dL    Recent Vancomycin Levels (past 3 days)  2022:  9:55 AM Vancomycin 20.7 mg/L    Vancomycin IV Administrations (past 72 hours)                   vancomycin (VANCOCIN) 1000 mg in dextrose 5% 200 mL PREMIX (mg) 1,000 mg New Bag 22 1106     1,000 mg New Bag 22 2124     1,000 mg New Bag  1057     1,000 mg New Bag 22 2146     1,000 mg New Bag  1045     1,000 mg New Bag 22 2236                Nephrotoxins and other renal medications (From now, onward)    Start     Dose/Rate Route Frequency Ordered Stop    22 0900  vancomycin (FIRVANQ) oral solution 125 mg         125 mg Oral or Feeding Tube 4 TIMES DAILY 22 0819      22 0500  vancomycin (VANCOCIN) 1000 mg in dextrose 5% 200 mL PREMIX         1,000 mg  200 mL/hr over 1 Hours Intravenous EVERY 12 HOURS 22 2242               Contrast Orders - past 72 hours (72h ago, onward)    None          Interpretation of levels and current regimen:  Vancomycin level is reflective of therapeutic level    Has serum creatinine changed greater than 50% in last 72 hours: No    Urine output:  good urine output    Renal Function: Stable    Plan:  1. Continue Current Dose  2. Vancomycin monitoring method: Trough (Method 1 = dosing nomogram)  3. Vancomycin therapeutic monitoring goal: 15-20 mg/L  4. Pharmacy will check vancomycin levels as appropriate in  5-7 Days.  5. Serum creatinine levels will be ordered daily for the first week of therapy and at least twice weekly for subsequent weeks.    Sofie De La RosaD

## 2022-08-14 NOTE — PROGRESS NOTES
[facility]  Hospitalist Progress Note  Name: Julisa Chaney    MRN: [id number]  Physician:  Bismark Temple DO, FHM (Text Page)  Securely message with the Vocera Web Console (learn more here)    Summary of Stay:  Julisa Chaney is a 77-year-old woman with medical history which includes trigeminal neuralgia who was recently admitted to [facility] on 7/19 for severe sepsis(had leukocytosis, lactic acidosis and elevated procalcitonin) due to COVID-19 infection and suspected bacterial pneumonia. Recent COVID-19 infection which was diagnosed on 7/19/2022 and was treated with 5 days of remdesivir and did not require any steroids and she was treated with IV vancomycin and IV meropenem.     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  She was switched to IV vancomycin, cefepime and metronidazole and transferred to [facility] for neurosurgery assessment. MRI brain w/wo contrast 7/30 showed ventriculitis with probable abscess. Neurosurgery/neurology/infectious disease/oncology consulted. Patient taken to the OR on 7/31 for left frontal ventriculostomy.   Since then has remained on broad spectrum antibiotics.  In addition she has developed C. Difficile colitis and is on treatment.  She has continued to have a significant encephalopathy.    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  -Neurosurgery and oncology were consulted and flow cytometry was done which did not show any evidence of malignancy.  -Patient underwent left frontal ventriculostomy on 7/31/2022. Post-op management per  neurosurgery team.  -CSF sent on 8/5 which revealed , , glucose 98 and protein 84. WBC this AM 25, . CSF 2cc for repeat labs. CSF with sediment. EVD was clamped on 8/6 and removed on 8/8.  -Was on IV vancomycin, cefepime, and metronidazole. ID planning 6 weeks of treatment with current regimen - unable to de-escalate as cultures have been negative.  Daughter concerned about the cefepime. Patient apparently had similar symptoms of lethargy and confusion when on rocephin in February 2022 which improved after rocephin was discontinued. Unclear if rocephin was the etiology at that time, symptoms may have been due to her illness. Daughter would like to try a different antibiotic if possible. Following this ID stopped cefepime and metronidazole and started meropenem.  Also remained on vancomycin.    8/14 Update:  -  Continue meropenem, vancomycin IV.  Plan for repeat brain imaging Monday going into the weekend.  The patient has made modest improvement the past 24 hours following more commands.  If she makes some dramatic improvement overnight could hold on imaging.  However, if she hasn't continued to improve tomorrow would repeat imaging as ID recommended.  The MRI brain will need to be ordered in the AM if she hasn't continued making substantial improvement.  -  Patient still very encephalopathic however somewhat more awake/slightly more alert past 12 hours.  She has followed commands on occasion to move toes of fingers/hands.    -  Attempted to update patients daughter yesterday but was not able to reach.  I will try again later today.  My colleague spoke with her on 8/12/22 regarding concerns about overall prognosis and lack of progress with mental status. Plan to continue current treatments as noted above with repeat imaging/reassessment Monday.  If not making more improvement and depending on imaging reassessment will likely need further goals of care discussion.   -  PT to try to work more with  her given some command following/improvement      Acute infectious encephalopathy due to above:  Appeared to become more alert but has not really made progress beyond that now for a few days.  She continues to rarely speak and doesn't consistently follow commands.    -Continue to treat underlying issues as noted above.    -Maintain normal sleep/wake cycle as able.  -Avoid opioid pain medications as able.  -Concern about lack of improvement with mental status as noted above, re-image on Monday.     C. difficile colitis:  Patient was noted to have copious diarrhea on 8/1 and sample came back positive for C. Difficile.  -Continue oral vancomycin.  Will need a prolonged treatment given other abx use.  -Rectal tube still in place, liquid stool output still variable and at times fairly significant.  Will leave in place today.  -ID following as noted above.     Acute on chronic anemia:  Prior labs show baseline hemoglobin of about 9-10. Hemoglobin on admission was 11.7. Has slowly trended down, now low enough to recommend PRBC transfusion on 8/8/22. No obvious ongoing bleeding noted. WBC and platelets - normal-elevated. Iron panel on 7/30/22 showed that her total iron was low at 14, binding capacity was low at 28, iron saturation was 6 and B12 was high and folate was normal.  -Repeat hemoglobin 8/8 lower at 6.4, then 6.9 on recheck. Transfused one unit PRBCs on 8/8/22.  -Hemoglobin stable/improved at 9.1 on 8/12/22.  She is iron deficient, received IV iron infusion x3 with first on 8/11/22.  -Continue to monitor, hgb stable above 8.  Recheck tomorrow.     Sinus tachycardia:  -  Overall improved, still around 100 at times.  Treat underlying issues as noted.  Continue to monitor.    History of trigeminal neuralgia  -Continue PTA Trileptal and Topamax.     History of chronic pyloric ulcer  She had EGD done in 2020 which showed gastroduodenitis with a duodenal stricture.  -Continue PPI.     Hyperglycemia  Recent HbA1c was 5.6  on 7/19/22. Was not on any medication prior to admission.  -Blood sugars mildly elevated but overall fairly well controlled.  -Continue medium dose sliding scale insulin every 4 hours.  -Lantus 10 units at bedtime currently, given glu values will increase to 12 units tonight (8/14).     Intermittent Hypokalemia, Hyponatremia, Hypophosphatemia:  -Recheck and replace per protocol.  -Sodium down to 132 on 8/11/22, decreased free water flushes to 75 ml every 4 hours and now improved to 136 on 8/13/22.  -Continue to follow trend.     Severe protein calorie malnutrition:  -Dietitian consulted.  Currently getting tube feedings and free water flushes via NG feeding tube.  -Will need SLP consult when more able to participate.  If not making more neuro progress by Monday/Tuesday consider as part of goals of care discussions longer term FT placement.     Physical deconditioning:  -Patient seems severely deconditioned.  -PT/OT as able, not really able to substantially participate at this point due to her mental status.            FAMILY Update:  I spoke with her daughter Alissa this afternoon.  Explained the patients current status including her slight improvement with following some commands.   All her questions were answered.  She understands plan for possible MRI tomorrow and ongoing concern for her lack of substantial improvement.  She is open to discussing a move to a longer term feeding tube this week and other goals of care depending on how her condition trends.    COVID Status:  COVID-19 PCR Results    COVID-19 PCR Results 8/8/20 2/7/22 7/19/22   SARS-CoV-2 Virus Specimen Source Nasopharyngeal     SARS-CoV-2 PCR Result NEGATIVE     SARS CoV2 PCR  Negative Positive (A)   (A) Abnormal value       Comments are available for some flowsheets but are not being displayed.         COVID-19 Antibody Results, Testing for Immunity    COVID-19 Antibody Results, Testing for Immunity   No data to display.            Diet: Adult  Formula Drip Feeding: Continuous Jevity 1.5; Nasogastric tube; Goal Rate: 50; mL/hr; Medication - Feeding Tube Flush Frequency: At least 15-30 mL water before and after medication administration and with tube clogging; Amount to Send (Nutrit...    DVT Prophylaxis: Pneumatic Compression Devices, if acceptable with neuro consider some heparin subcut once imaging repeated/reassessed tomorrow.  Abrams Catheter: Not present    Cardiac Monitoring: ACTIVE order. Indication: ICU    Code Status: Full Code        Disposition Plan   Likely can move out of ICU soon.  Plan to repeat imaging tomorrow.     Entered: Bismark Temple, DO 08/14/2022, 11:07 AM       Interval History   Attempted questions but she did not answer them.   RN reports patient more alert at times past 12 hours with some following of commands/couple words spoken.    -Data reviewed today: I reviewed all new labs and imaging reports over the last 24 hours. I personally reviewed no images or EKG's today.    Physical Exam   Temp: 98.4  F (36.9  C) Temp src: Oral BP: 103/58 Pulse: 106   Resp: 29 O2 sats ranges reviewed.      Vitals:    08/12/22 0600 08/13/22 0600 08/14/22 0500   Weight: 53.8 kg (118 lb 9.7 oz) 59.6 kg (131 lb 6.3 oz) 55.7 kg (122 lb 12.7 oz)     Vital Signs with Ranges  Temp:  [98.1  F (36.7  C)-99.9  F (37.7  C)] 98.4  F (36.9  C)  Pulse:  [] 106  Resp:  [15-45] 29  BP: ()/(55-86) 103/58  SpO2:  [84 %-99 %] 95 %  I/O last 3 completed shifts:  In: 2460 [I.V.:500; NG/GT:760]  Out: 1800 [Urine:1300; Stool:500]    GEN:  Alert, seems to look at me with talking to her but does not follow commands or answer my questions.  HEENT:  No scleral icterus, no nasal discharge, mouth moist.  CV:  Regular rate and rhythm, distant.  No rub.  LUNGS:  Clear to auscultation upper, faint coarse sounds more toward bases.  No wheezes/retractions.  Symmetric chest rise on inhalation noted.  ABD:  Active bowel sounds, soft, non-tender/non-distended.  No  rebound/guarding/rigidity.  EXT:  Trace edema.  No cyanosis.  No acute joint synovitis noted.    SKIN:  Dry to touch, no exanthems noted in the visualized areas.  NEURO:  Doesn't consistently follow commands or answer questions consistently.  Does spontaneously move extremities.  Sensation to touch appears grossly intact.    Medications     - MEDICATION INSTRUCTIONS -         insulin aspart  1-6 Units Subcutaneous Q4H     insulin glargine  10 Units Subcutaneous At Bedtime     iron sucrose  300 mg Intravenous Q48H     levETIRAcetam  1,000 mg Intravenous Q12H     meropenem  2 g Intravenous Q8H     multivitamins w/minerals  15 mL Per Feeding Tube Daily     OXcarbazepine  450 mg Oral or Feeding Tube At Bedtime     pantoprazole  40 mg Oral or Feeding Tube QAM AC     potassium & sodium phosphates  1 packet Oral or Feeding Tube Q4H     sodium chloride (PF)  10-40 mL Intracatheter Q7 Days     topiramate  50 mg Oral or Feeding Tube At Bedtime     vancomycin  125 mg Oral or Feeding Tube 4x Daily     vancomycin  1,000 mg Intravenous Q12H     Data     Recent Labs   Lab 08/13/22  0558 08/12/22  0516 08/11/22  0714   WBC 8.4 9.5 10.0   HGB 8.5* 9.1* 9.1*   HCT 30.0* 31.0* 30.2*   MCV 77* 75* 72*   * 588* 540*     Recent Labs   Lab 08/14/22  0808 08/14/22  0502 08/14/22  0425 08/13/22  0759 08/13/22  0558 08/12/22  0818 08/12/22  0516 08/11/22  0918 08/11/22  0714 08/09/22  0420 08/09/22  0418 08/08/22  0825 08/08/22  0610   NA  --  140  --   --  136  --  137  --  132*   < > 135  --  134   POTASSIUM  --  3.5  3.5  --   --  3.5  --  3.6  --  4.4   < > 3.8  --  4.0   CHLORIDE  --  106  --   --  104  --  104  --  98   < > 103  --  102   CO2  --  28  --   --  28  --  28  --  28   < > 28  --  27   ANIONGAP  --  6  --   --  4  --  5  --  6   < > 4  --  5   * 145* 158*   < > 166*   < > 195*   < > 160*   < > 181*   < > 139*   BUN  --  12  --   --  13  --  12  --  11   < > 10  --  12   CR  --  0.44*  --   --  0.44*  --   0.37*  --  0.34*   < > 0.32*  --  0.36*   GFRESTIMATED  --  >90  --   --  >90  --  >90  --  >90   < > >90  --  >90   ADY  --  8.9  --   --  8.8  --  8.6  --  8.5   < > 8.6  --  8.2*   MAG  --   --   --   --   --   --   --   --   --   --   --   --  2.2   PHOS  --  2.2*  --   --  2.6  --   --   --  3.1   < > 2.2*  --  2.3*   PROTTOTAL  --   --   --   --   --   --   --   --   --   --  5.8*  --   --    ALBUMIN  --   --   --   --   --   --   --   --   --   --  1.9*  --   --    BILITOTAL  --   --   --   --   --   --   --   --   --   --  0.4  --   --    ALKPHOS  --   --   --   --   --   --   --   --   --   --  72  --   --    AST  --   --   --   --   --   --   --   --   --   --  6  --   --    ALT  --   --   --   --   --   --   --   --   --   --  9  --   --     < > = values in this interval not displayed.       No results found for this or any previous visit (from the past 24 hour(s)).

## 2022-08-14 NOTE — PROGRESS NOTES
[facility]    Neurosurgery  Daily Post-Op Note    Assessment & Plan   Procedure(s):  LEFT FRONTAL VENTRICULOSTOMY   14 Days Post-Op   EVD was removed 6 days ago.  She now alerts to voice and is able to verbalize that she is doing okay.  Today she can squeeze left hand to command and wiggle left foot to command.    Plan:  -Advance activity as tolerated  -Continue supportive and symptomatic treatment  -Start or continue physical therapy      Chucho Balderas PA-C    Interval History   Stable.  Doing well.  Improving slowly.      Physical Exam   Temp: 98.4  F (36.9  C) Temp src: Oral BP: 103/58 Pulse: 106   Resp: 29 SpO2: 95 %      Vitals:    08/12/22 0600 08/13/22 0600 08/14/22 0500   Weight: 53.8 kg (118 lb 9.7 oz) 59.6 kg (131 lb 6.3 oz) 55.7 kg (122 lb 12.7 oz)     Vital Signs with Ranges  Temp:  [98.1  F (36.7  C)-99.9  F (37.7  C)] 98.4  F (36.9  C)  Pulse:  [] 106  Resp:  [15-45] 29  BP: ()/(55-86) 103/58  SpO2:  [84 %-99 %] 95 %  I/O last 3 completed shifts:  In: 2460 [I.V.:500; NG/GT:760]  Out: 1800 [Urine:1300; Stool:500]    Alert and oriented.  Moves all extremities equally.      Incision: CDI      Medications     - MEDICATION INSTRUCTIONS -          insulin aspart  1-6 Units Subcutaneous Q4H     insulin glargine  10 Units Subcutaneous At Bedtime     iron sucrose  300 mg Intravenous Q48H     levETIRAcetam  1,000 mg Intravenous Q12H     meropenem  2 g Intravenous Q8H     multivitamins w/minerals  15 mL Per Feeding Tube Daily     OXcarbazepine  450 mg Oral or Feeding Tube At Bedtime     pantoprazole  40 mg Oral or Feeding Tube QAM AC     potassium & sodium phosphates  1 packet Oral or Feeding Tube Q4H     sodium chloride (PF)  10-40 mL Intracatheter Q7 Days     topiramate  50 mg Oral or Feeding Tube At Bedtime     vancomycin  125 mg Oral or Feeding Tube 4x Daily     vancomycin  1,000 mg Intravenous Q12H           Chucho C. Andrey PA-C  [facility]  [address]  [address]  [place] [address]    Tel [phone]  Pager [phone]

## 2022-08-15 ENCOUNTER — APPOINTMENT (OUTPATIENT)
Dept: OCCUPATIONAL THERAPY | Facility: CLINIC | Age: 77
DRG: 023 | End: 2022-08-15
Attending: INTERNAL MEDICINE
Payer: COMMERCIAL

## 2022-08-15 LAB
ANION GAP SERPL CALCULATED.3IONS-SCNC: 3 MMOL/L (ref 3–14)
BACTERIA CSF CULT: NORMAL
BUN SERPL-MCNC: 15 MG/DL (ref 7–30)
CALCIUM SERPL-MCNC: 9.1 MG/DL (ref 8.5–10.1)
CHLORIDE BLD-SCNC: 107 MMOL/L (ref 94–109)
CO2 SERPL-SCNC: 27 MMOL/L (ref 20–32)
CREAT SERPL-MCNC: 0.38 MG/DL (ref 0.52–1.04)
GFR SERPL CREATININE-BSD FRML MDRD: >90 ML/MIN/1.73M2
GLUCOSE BLD-MCNC: 159 MG/DL (ref 70–99)
GLUCOSE BLDC GLUCOMTR-MCNC: 127 MG/DL (ref 70–99)
GLUCOSE BLDC GLUCOMTR-MCNC: 135 MG/DL (ref 70–99)
GLUCOSE BLDC GLUCOMTR-MCNC: 155 MG/DL (ref 70–99)
GLUCOSE BLDC GLUCOMTR-MCNC: 171 MG/DL (ref 70–99)
GLUCOSE BLDC GLUCOMTR-MCNC: 189 MG/DL (ref 70–99)
GLUCOSE BLDC GLUCOMTR-MCNC: 89 MG/DL (ref 70–99)
MAGNESIUM SERPL-MCNC: 2.4 MG/DL (ref 1.6–2.3)
PHOSPHATE SERPL-MCNC: 2.5 MG/DL (ref 2.5–4.5)
POTASSIUM BLD-SCNC: 3.7 MMOL/L (ref 3.4–5.3)
SODIUM SERPL-SCNC: 137 MMOL/L (ref 133–144)
VANCOMYCIN SERPL-MCNC: 24.7 MG/L

## 2022-08-15 PROCEDURE — 250N000013 HC RX MED GY IP 250 OP 250 PS 637: Performed by: HOSPITALIST

## 2022-08-15 PROCEDURE — 250N000013 HC RX MED GY IP 250 OP 250 PS 637: Performed by: INTERNAL MEDICINE

## 2022-08-15 PROCEDURE — 999N000190 HC STATISTIC VAT ROUNDS

## 2022-08-15 PROCEDURE — 99233 SBSQ HOSP IP/OBS HIGH 50: CPT | Performed by: INTERNAL MEDICINE

## 2022-08-15 PROCEDURE — 36415 COLL VENOUS BLD VENIPUNCTURE: CPT | Performed by: HOSPITALIST

## 2022-08-15 PROCEDURE — 82374 ASSAY BLOOD CARBON DIOXIDE: CPT | Performed by: HOSPITALIST

## 2022-08-15 PROCEDURE — 250N000011 HC RX IP 250 OP 636: Performed by: INTERNAL MEDICINE

## 2022-08-15 PROCEDURE — 99233 SBSQ HOSP IP/OBS HIGH 50: CPT | Performed by: SPECIALIST

## 2022-08-15 PROCEDURE — 258N000003 HC RX IP 258 OP 636: Performed by: INTERNAL MEDICINE

## 2022-08-15 PROCEDURE — 83735 ASSAY OF MAGNESIUM: CPT | Performed by: HOSPITALIST

## 2022-08-15 PROCEDURE — 80202 ASSAY OF VANCOMYCIN: CPT | Performed by: INTERNAL MEDICINE

## 2022-08-15 PROCEDURE — 97530 THERAPEUTIC ACTIVITIES: CPT | Mod: GO

## 2022-08-15 PROCEDURE — 250N000011 HC RX IP 250 OP 636: Performed by: HOSPITALIST

## 2022-08-15 PROCEDURE — 120N000001 HC R&B MED SURG/OB

## 2022-08-15 PROCEDURE — 84100 ASSAY OF PHOSPHORUS: CPT | Performed by: HOSPITALIST

## 2022-08-15 PROCEDURE — 36415 COLL VENOUS BLD VENIPUNCTURE: CPT | Performed by: INTERNAL MEDICINE

## 2022-08-15 PROCEDURE — 258N000003 HC RX IP 258 OP 636: Performed by: HOSPITALIST

## 2022-08-15 RX ADMIN — LEVETIRACETAM 1000 MG: 10 INJECTION INTRAVENOUS at 23:10

## 2022-08-15 RX ADMIN — VANCOMYCIN HYDROCHLORIDE 1000 MG: 1 INJECTION, SOLUTION INTRAVENOUS at 11:10

## 2022-08-15 RX ADMIN — MEROPENEM 2 G: 1 INJECTION, POWDER, FOR SOLUTION INTRAVENOUS at 13:33

## 2022-08-15 RX ADMIN — LEVETIRACETAM 1000 MG: 10 INJECTION INTRAVENOUS at 09:59

## 2022-08-15 RX ADMIN — INSULIN ASPART 1 UNITS: 100 INJECTION, SOLUTION INTRAVENOUS; SUBCUTANEOUS at 00:31

## 2022-08-15 RX ADMIN — Medication 15 ML: at 09:59

## 2022-08-15 RX ADMIN — VANCOMYCIN HYDROCHLORIDE 125 MG: KIT at 13:35

## 2022-08-15 RX ADMIN — VANCOMYCIN HYDROCHLORIDE 750 MG: 1 INJECTION, POWDER, LYOPHILIZED, FOR SOLUTION INTRAVENOUS at 23:10

## 2022-08-15 RX ADMIN — IRON SUCROSE 300 MG: 20 INJECTION, SOLUTION INTRAVENOUS at 07:42

## 2022-08-15 RX ADMIN — ACETAMINOPHEN 650 MG: 325 SUSPENSION ORAL at 09:59

## 2022-08-15 RX ADMIN — VANCOMYCIN HYDROCHLORIDE 125 MG: KIT at 09:59

## 2022-08-15 RX ADMIN — Medication 40 MG: at 07:45

## 2022-08-15 RX ADMIN — INSULIN ASPART 1 UNITS: 100 INJECTION, SOLUTION INTRAVENOUS; SUBCUTANEOUS at 11:20

## 2022-08-15 RX ADMIN — INSULIN ASPART 1 UNITS: 100 INJECTION, SOLUTION INTRAVENOUS; SUBCUTANEOUS at 07:49

## 2022-08-15 RX ADMIN — MEROPENEM 2 G: 1 INJECTION, POWDER, FOR SOLUTION INTRAVENOUS at 04:05

## 2022-08-15 RX ADMIN — Medication 1 PACKET: at 13:43

## 2022-08-15 RX ADMIN — MEROPENEM 2 G: 1 INJECTION, POWDER, FOR SOLUTION INTRAVENOUS at 20:24

## 2022-08-15 RX ADMIN — VANCOMYCIN HYDROCHLORIDE 125 MG: KIT at 18:43

## 2022-08-15 ASSESSMENT — ACTIVITIES OF DAILY LIVING (ADL)
ADLS_ACUITY_SCORE: 46
ADLS_ACUITY_SCORE: 44
ADLS_ACUITY_SCORE: 44
ADLS_ACUITY_SCORE: 48
ADLS_ACUITY_SCORE: 44
ADLS_ACUITY_SCORE: 48
ADLS_ACUITY_SCORE: 48
ADLS_ACUITY_SCORE: 46
ADLS_ACUITY_SCORE: 44
ADLS_ACUITY_SCORE: 44

## 2022-08-15 NOTE — PROGRESS NOTES
[facility]    Infectious Disease Progress Note    Date of Service: 08/15/2022     Assessment:  Patient transferred 7/27 from [facility] for concern for brain abscess (recovered COVID) - MRI suggestive of ventriculitis with left lateral ventricle abscess. Has been on metronidaole/ceftriaxone/vancomycin, s/p left frontal ventriculostomy  CSF cxs have remained negative. Course complicated by development of C.diff colitis.     Recommendations  1. Continue Meropenem / Vancomycin. Plan 6 week treatment course for brain abscess/ ventriculitis until 9/8  2. Consider repeating MRI brain W/WO contrast  3. Treat c.diff for a week following antibiotic discontinuation    OPIV antibiotic orders placed in [facility] for discharge planning    Nuzhat Hudson MD    Interval History   Moved to a floor bed, answers some questions but remains confused, afebrile     Physical Exam   Temp: 97.7  F (36.5  C) Temp src: Axillary BP: 113/55 Pulse: 96   Resp: 24 SpO2: 95 % O2 Device: None (Room air)    Vitals:    08/13/22 0600 08/14/22 0500 08/15/22 0400   Weight: 59.6 kg (131 lb 6.3 oz) 55.7 kg (122 lb 12.7 oz) 51.8 kg (114 lb 4.8 oz)     Vital Signs with Ranges  Temp:  [97.7  F (36.5  C)-99.5  F (37.5  C)] 97.7  F (36.5  C)  Pulse:  [] 96  Resp:  [14-28] 24  BP: (110-135)/(55-76) 113/55  SpO2:  [94 %-99 %] 95 %    Constitutional: opens eyes to verbal commands  Lungs: Clear   Cardiovascular: Regular rate and rhythm, normal S1 and S2, and no murmur noted  Abdomen: soft  Skin: No rash    Other:    Medications     - MEDICATION INSTRUCTIONS -         banatrol plus  1 packet Per Feeding Tube TID w/meals     insulin aspart  1-6 Units Subcutaneous Q4H     insulin glargine  12 Units Subcutaneous At Bedtime     levETIRAcetam  1,000 mg Intravenous Q12H     meropenem  2 g Intravenous Q8H     multivitamins w/minerals  15 mL Per Feeding Tube Daily     OXcarbazepine  450 mg Oral or Feeding Tube At Bedtime     pantoprazole  40 mg Oral or  Feeding Tube QAM AC     sodium chloride (PF)  10-40 mL Intracatheter Q7 Days     topiramate  50 mg Oral or Feeding Tube At Bedtime     vancomycin  125 mg Oral or Feeding Tube 4x Daily     vancomycin  1,000 mg Intravenous Q12H       Data   All microbiology laboratory data reviewed.  Recent Labs   Lab Test 08/13/22  0558 08/12/22  0516 08/11/22  0714   WBC 8.4 9.5 10.0   HGB 8.5* 9.1* 9.1*   HCT 30.0* 31.0* 30.2*   MCV 77* 75* 72*   * 588* 540*     Recent Labs   Lab Test 08/15/22  0548 08/14/22  0502 08/13/22  0558   CR 0.38* 0.44* 0.44*     Recent Labs   Lab Test 08/08/20  0212   SED 13

## 2022-08-15 NOTE — PLAN OF CARE
Physical Therapy: Orders received. Chart reviewed and discussed with OT.? Physical Therapy not indicated due to pt unable to tolerate both disciplines of OT and PT at this time. OT to continue to work with pt and request PT orders once pt able to participate more consistently and demonstrate progress toward functional mobility goals.? Defer discharge recommendations to  OT.? Will complete PT orders.

## 2022-08-15 NOTE — PROGRESS NOTES
CLINICAL NUTRITION SERVICES - REASSESSMENT NOTE      Recommendations Ordered by Registered Dietitian (RD):   Still with stool >/=500 mL/day after changing to fiber containing TF formula 4 days ago  Trial Banatrol banana flakes TID for stool bulk   Continue liquid MVI/M while higher stool output persists    Malnutrition: (8/4)  % Weight Loss:  > 10% in 6 months (severe malnutrition)  % Intake:  Decreased intake does not meet criteria - EN meeting needs   Subcutaneous Fat Loss:  Orbital region moderate depletion  Muscle Loss:  Temporal region moderate depletion, Clavicle bone region moderate depletion, Acromion bone region moderate-severe depletion and Dorsal hand region severe depletion  Fluid Retention:  None noted     Malnutrition Diagnosis: Severe malnutrition  In Context of:  Acute illness or injury        EVALUATION OF PROGRESS TOWARD GOALS   Diet:  NPO    Nutrition Support:  TF changed to standard fiber containing formula on 8/11 as outlined below ~    Nutrition Support Enteral:  Type of Feeding Tube: NG  Enteral Frequency:  Continuous  Enteral Regimen: Jevity 1.5 at 50 mL/hr  Total Enteral Provisions: 1800 kcal (37 kcal/kg), 76 g protein (1.6 g/kg), 912 mL H2O, 258 g CHO, 25 g fiber   Free Water Flush: 75 mL every 4 hours (8/11 per MD)  Liquid MVI/M daily     Intake/Tolerance:    Labs reviewed: Na 137, K 3.7, Mg 2.4 (H), Phos 2.5  Recent Labs   Lab 08/15/22  0749 08/15/22  0548 08/15/22  0403 08/15/22  0027 08/14/22  1952 08/14/22  1708   * 159* 135* 189* 173* 163*     Medications reviewed: MSSI + 12 units lantus, no IVF   Stooling:  - 8/12: 600 mL  - 8/13: 500 mL  - 8/14: 500 mL  Wt: 114 lbs 4.8 oz - trending down   Vitals:    08/11/22 0600 08/12/22 0600 08/13/22 0600 08/14/22 0500   Weight: 55 kg (121 lb 4.1 oz) 53.8 kg (118 lb 9.7 oz) 59.6 kg (131 lb 6.3 oz) 55.7 kg (122 lb 12.7 oz)    08/15/22 0400   Weight: 51.8 kg (114 lb 4.8 oz)         ASSESSED NUTRITION NEEDS:  Dosing Weight 48.3 kg   Estimated  Energy Needs: 2275-4249 kcals (35-40 Kcal/Kg)  Justification: repletion and underweight  Estimated Protein Needs: 70-95 grams protein (1.5-2 g pro/Kg)  Justification: repletion and hypercatabolism with critical illness  Estimated Fluid Needs: 8159-8557 mL (1 mL/Kcal)  Justification: maintenance      NEW FINDINGS:   Chart reviewed  Has been more awake/alert but remains encephalopathic   Family considering Gtube this week per MD note     Previous Goals:   EN to meet % estimated needs   Evaluation: Met  Stool <500 mL/day   Evaluation: Not met    Previous Nutrition Diagnosis:   Altered GI function related to diarrhea as evidenced by liquid RT output up to 900 mL/day, now improved down to 150 mL yesterday   Evaluation: No change      CURRENT NUTRITION DIAGNOSIS  Altered GI function related to diarrhea as evidenced by liquid RT output >/=500  mL/day in the past 3+ days    INTERVENTIONS  Recommendations / Nutrition Prescription  Trial Banantrol TID for stool bulk    Implementation  Medical Food Supplement: as above    Collaboration and Referral of Nutrition care: patient was discussed during ICU rounds     Goals  Stool <500 mL/hr   EN to meet % estimated needs       MONITORING AND EVALUATION:  Progress towards goals will be monitored and evaluated per protocol and Practice Guidelines      Christiane Horner RD, LD  Clinical Dietitian   ICU pager [phone]

## 2022-08-15 NOTE — PROGRESS NOTES
[facility]    Medicine Progress Note - Hospitalist Service    Date of Admission:  7/27/2022    Assessment & Plan        Summary of Stay:  Julisa Chaney is a 77-year-old woman with medical history which includes trigeminal neuralgia who was recently admitted to [facility] on 7/19 for severe sepsis(had leukocytosis, lactic acidosis and elevated procalcitonin) due to COVID-19 infection and suspected bacterial pneumonia. Recent COVID-19 infection which was diagnosed on 7/19/2022 and was treated with 5 days of remdesivir and did not require any steroids and she was treated with IV vancomycin and IV meropenem.     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  She was switched to IV vancomycin, cefepime and metronidazole and transferred to [facility] for neurosurgery assessment. MRI brain w/wo contrast 7/30 showed ventriculitis with probable abscess. Neurosurgery/neurology/infectious disease/oncology consulted. Patient taken to the OR on 7/31 for left frontal ventriculostomy.   Since then has remained on broad spectrum antibiotics.  In addition she has developed C. Difficile colitis and is on treatment.  She has continued to have a significant encephalopathy.    Severe sepsis secondary to ventriculitis with abscess  Status left frontal ventriculostomy 7/31/22:  -Appreciate neurosurgery, oncology, infectious disease service assistance.  -Neurosurgery and oncology were consulted and flow cytometry was done which did not show any evidence of malignancy.  -Patient underwent left frontal ventriculostomy on 7/31/2022. Post-op management per neurosurgery team.  -CSF sent on 8/5 which revealed , , glucose 98 and protein 84.  WBC this AM 25, . CSF 2cc for repeat labs. CSF with sediment. EVD was clamped on 8/6 and removed on 8/8.  -Was on IV vancomycin, cefepime, and metronidazole. ID planning 6 weeks of treatment with current regimen - unable to de-escalate as cultures have been negative.  Daughter concerned about the cefepime. Patient apparently had similar symptoms of lethargy and confusion when on rocephin in February 2022 which improved after rocephin was discontinued. Unclear if rocephin was the etiology at that time, symptoms may have been due to her illness. Daughter would like to try a different antibiotic if possible. Following this ID stopped cefepime and metronidazole and started meropenem.  Also remained on vancomycin.     8/15 Update:  -  Continue meropenem, vancomycin IV.  Plan for repeat brain imaging today.  Staples being removed by neurosurgery.  -  Clinically improving. She has followed commands on occasion to move toes of fingers/hands.    -Continue ID follow-up      Acute infectious encephalopathy due to above:  Appeared to become more alert but has not really made progress beyond that now for a few days.  She continues to rarely speak and doesn't consistently follow commands.    -Continue to treat underlying issues as noted above.    -Maintain normal sleep/wake cycle as able.  -Avoid opioid pain medications as able.  -Concern about lack of improvement with mental status as noted above, re-image on Monday.     C. difficile colitis:  Patient was noted to have copious diarrhea on 8/1 and sample came back positive for C. Difficile.  -Continue oral vancomycin.  Will need a prolonged treatment given other abx use.  -Rectal tube still in place, liquid stool output still variable and at times fairly significant.  Will leave in place today.  -ID following as noted above.     Acute on chronic anemia:  Prior labs show baseline hemoglobin of about 9-10. Hemoglobin on admission was 11.7. Has slowly trended down, now low  enough to recommend PRBC transfusion on 8/8/22. No obvious ongoing bleeding noted. WBC and platelets - normal-elevated. Iron panel on 7/30/22 showed that her total iron was low at 14, binding capacity was low at 28, iron saturation was 6 and B12 was high and folate was normal.  -Repeat hemoglobin 8/8 lower at 6.4, then 6.9 on recheck. Transfused one unit PRBCs on 8/8/22.  -Hemoglobin stable/improved at 9.1 on 8/12/22.  She is iron deficient, received IV iron infusion x3 with first on 8/11/22.  -Continue to monitor, hgb stable above 8.  Recheck tomorrow.     Sinus tachycardia:  -  Overall improved, still around 100 at times.  Treat underlying issues as noted.  Continue to monitor.     History of trigeminal neuralgia  -Continue PTA Trileptal and Topamax.     History of chronic pyloric ulcer  She had EGD done in 2020 which showed gastroduodenitis with a duodenal stricture.  -Continue PPI.     Hyperglycemia  Recent HbA1c was 5.6 on 7/19/22. Was not on any medication prior to admission.  -Blood sugars mildly elevated but overall fairly well controlled.  -Continue medium dose sliding scale insulin every 4 hours.  -Lantus 10 units at bedtime currently, given glu values will increase to 12 units tonight (8/14).     Recent Labs   Lab 08/15/22  1119 08/15/22  0749 08/15/22  0548 08/15/22  0403 08/15/22  0027 08/14/22  1952   * 155* 159* 135* 189* 173*       Intermittent Hypokalemia, Hyponatremia, Hypophosphatemia:  -Recheck and replace per protocol.  -Sodium down to 132 on 8/11/22, decreased free water flushes to 75 ml every 4 hours and now improved to 136 on 8/13/22.  -Continue to follow trend.     Severe protein calorie malnutrition:  -Dietitian consulted.  Currently getting tube feedings and free water flushes via NG feeding tube.  -Will need SLP consult when more able to participate.  If not making more neuro progress by Monday/Tuesday consider as part of goals of care discussions longer term FT  "placement.     Physical deconditioning:  -Patient seems severely deconditioned.  -PT/OT as able, not really able to substantially participate at this point due to her mental status.        Diet: Adult Formula Drip Feeding: Continuous Jevity 1.5; Nasogastric tube; Goal Rate: 50; mL/hr; Medication - Feeding Tube Flush Frequency: At least 15-30 mL water before and after medication administration and with tube clogging; Amount to Send (Nutrit...    DVT Prophylaxis: Pneumatic Compression Devices  Abrasm Catheter: Not present  Central Lines: PRESENT  PICC Triple Lumen 07/22/22 Right Basilic Vascular access-Site Assessment: WDL  Cardiac Monitoring: ACTIVE order. Indication: ICU  Code Status: Full Code      Disposition Plan     Expected Discharge Date: 08/15/2022        Discharge Comments: Needs NG feeding tube placed        The patient's care was discussed with the Bedside Nurse and Patient.    Timmy Grover MD, MD  Hospitalist Service  [facility]  Securely message with the Vocera Web Console (learn more here)  Text page via [email or web address] Paging/Directory     \"This dictation was performed with voice recognition software and may contain errors,  omissions and inadvertent word substitution.\"     Clinically Significant Risk Factors Present on Admission                    ______________________________________________________________________    Interval History   My first interaction with the patient today.   Discussed with RN and patient had remained stable with intermittent following instructions    Patient complains of pain in the head but denies any nausea/vomiting or blurred vision  Able to follow instructions  4 point ROS done and negative unless mentioned    Data reviewed today: I reviewed all medications, new labs and imaging results over the last 24 hours. I personally reviewed no images or EKG's today.    Physical Exam   /55 (BP Location: Left arm)   Pulse 96   Temp 97.7  F (36.5  C) " (Axillary)   Resp 24   Wt 51.8 kg (114 lb 4.8 oz)   SpO2 95%   BMI 19.02 kg/m    Gen- pleasant  lying in bed  HEENT-  JANN, head incision C/d/i further as per neurosurgery  Neck- supple   CVS- I+II+ no m/r/g  RS- CTAB  Abdo- soft, no tenderness. No g/r/r, BS+ no hepatosplenomegaly   Ext- no edema     Data    BMPRecent Labs   Lab 08/15/22  1119 08/15/22  0749 08/15/22  0548 08/15/22  0403 08/14/22  0808 08/14/22  0502 08/13/22  0759 08/13/22  0558 08/12/22  0818 08/12/22  0516   NA  --   --  137  --   --  140  --  136  --  137   POTASSIUM  --   --  3.7  --   --  3.5  3.5  --  3.5  --  3.6   CHLORIDE  --   --  107  --   --  106  --  104  --  104   ADY  --   --  9.1  --   --  8.9  --  8.8  --  8.6   CO2  --   --  27  --   --  28  --  28  --  28   BUN  --   --  15  --   --  12  --  13  --  12   CR  --   --  0.38*  --   --  0.44*  --  0.44*  --  0.37*   * 155* 159* 135*   < > 145*   < > 166*   < > 195*    < > = values in this interval not displayed.     CBC  Recent Labs   Lab 08/13/22  0558 08/12/22  0516 08/11/22  0714 08/10/22  0417   WBC 8.4 9.5 10.0 9.8   RBC 3.92 4.14 4.17 3.90   HGB 8.5* 9.1* 9.1* 8.6*   HCT 30.0* 31.0* 30.2* 29.1*   MCV 77* 75* 72* 75*   MCH 21.7* 22.0* 21.8* 22.1*   MCHC 28.3* 29.4* 30.1* 29.6*   RDW 21.1* 20.2* 20.2* 19.5*   * 588* 540* 491*     INRNo lab results found in last 7 days.  LFTs  Recent Labs   Lab 08/09/22  0418   ALKPHOS 72   AST 6   ALT 9   BILITOTAL 0.4   PROTTOTAL 5.8*   ALBUMIN 1.9*      PANCNo lab results found in last 7 days.    No results found for this or any previous visit (from the past 24 hour(s)).

## 2022-08-15 NOTE — PLAN OF CARE
Goal Outcome Evaluation:          Overall Patient Progress: no change    Outcome Evaluation: Tolerating fiber containing TF at goal rate since 8/11. Still w/ high RT output. Trial Banatrol TID for stool bulk

## 2022-08-15 NOTE — PLAN OF CARE
Shift Summary  Assumed cares from [id number] when patient transferred out of ICU.     Neuro: Arouses to voice, intermittently follows commands, pt continues to be very lethargic. Right sided extremities slightly weaker than left. PERRL. MRI checklist done with patient's daughter via phone this morning.   Cardiac: ST w/occassional PACs. Pressures normotensive without the use of antihypertensives.   Pulmonary: LS diminished throughout, on room air.   GI: Dignashield in place and draining watery stool.   : Purewick in place but does not collect urine very well due to rectal tube in place. Pt with large incontinence every two hours.   Integumentary: Surgical staples and drain suture removed per orders. Sites are approximated without drainage.   Restraints: Not needed  Activity: Chairfast    Plan of care has been explained to patient: Yes, as well as to pt's daughter via phone.     Rosa Anderson RN

## 2022-08-15 NOTE — PROGRESS NOTES
Neurosurgery progress note    Postoperative day 15 status post left frontal ventriculostomy placement  7 days status post ventriculostomy removal    Patient is lying in bed, states she is feeling fine.    Exam    Incision clean dry and intact with staples in place  EVD exit site dry with sutures in place  Squeezes both hands  Wiggles both of her feet    Plan    Remove staples and drain stitch today  Care as per hospitalist and infectious disease  Neurosurgery will sign off

## 2022-08-15 NOTE — PLAN OF CARE
Writer called and spoke to pt's daughter Alissa, informing her pt transferred out of the ICU to 7th floor. Alissa had no further questions.

## 2022-08-15 NOTE — PROGRESS NOTES
No major events overnight. Patient lethargic at times but able to follow commands inconsistently. Moves all extremities R side weaker than L side. Pt able verbalize and answer very basic questions inconsistently with a yes or no. C/o headache treated with PRN tylenol with good effect toward beginning of shift. Pt continues to put out moderate amounts of urine and liquid stool requiring an external cath and rectal tube. External cath doesn't work very well due to rectal tube. Rash to khushbu area protected by frequent application of barrier cream with each incontinence care.

## 2022-08-15 NOTE — PROGRESS NOTES
The patient is alert, but lethargic. Answers yes and no questions at times and follows commands with extremities. Left arm and leg are stronger than the right extremities and move more spontaneously. Patient wiggles toes and squeezes hands to command most of the time she is asked to do so.   Lung sounds clear/diminished.   Vitals stable on room air. No fevers.   Sinus rhythm/Sinus tachycardia on monitor.   Pure wick in place. Some leaking around device. Incontinence care done every 2 hours with patient turning/repositioning.   Rectal tube remains in place. Decent output overnight last night. Slowing down today.   Some redness to khushbu-area. Barrier ointment applied.   Ebenezer Gallardo RN 7:40 PM 08/14/22

## 2022-08-15 NOTE — PLAN OF CARE
Goal Outcome Evaluation:    Plan of Care Reviewed With: patient     Overall Patient Progress: improving    Reason for Admission: POD 15 L ventriculostomy placement, POD 7 ventriculostomy removal s/p ventriculitis with abscess    Cognitive/Mentation: Unable to assess, pt is lethargic and follows commands inconsistently  Neuros/CMS: Intact ex R side weaker than L, weak pulses 1+, dry crusty bilateral eye drainage.  VS: VSS. SBP goal<150.   GI: BS audible. last BM 8/15/22. Fecal incontinence containment device. (+) for C-diff.   : Purwick. Incontinent.  Pulmonary: LS diminished.  Pain: none.     Drains/Lines: R PICC  Skin: Scattered bruises, rash on khushbu area, barrier cream used, mepilex in place. Removed sutures/staples today.  Activity: Assist x 2 with lift. T/R Q2h.  Diet: NPO. TF 50ml/hr, Free water flush 75ml Q4h.    Therapies recs: Pending  Discharge: Pending    Aggression Stoplight Tool: GREEN    End of shift summary: Transferred from ICU around 1200. MRSA- contact isolation maintained, C-diff- enteric isolation maintained.    3-7pm- No change

## 2022-08-16 ENCOUNTER — APPOINTMENT (OUTPATIENT)
Dept: GENERAL RADIOLOGY | Facility: CLINIC | Age: 77
DRG: 023 | End: 2022-08-16
Attending: INTERNAL MEDICINE
Payer: COMMERCIAL

## 2022-08-16 ENCOUNTER — APPOINTMENT (OUTPATIENT)
Dept: INTERVENTIONAL RADIOLOGY/VASCULAR | Facility: CLINIC | Age: 77
DRG: 023 | End: 2022-08-16
Attending: NURSE PRACTITIONER
Payer: COMMERCIAL

## 2022-08-16 LAB
GLUCOSE BLDC GLUCOMTR-MCNC: 103 MG/DL (ref 70–99)
GLUCOSE BLDC GLUCOMTR-MCNC: 112 MG/DL (ref 70–99)
GLUCOSE BLDC GLUCOMTR-MCNC: 116 MG/DL (ref 70–99)
GLUCOSE BLDC GLUCOMTR-MCNC: 117 MG/DL (ref 70–99)
GLUCOSE BLDC GLUCOMTR-MCNC: 82 MG/DL (ref 70–99)
GLUCOSE BLDC GLUCOMTR-MCNC: 89 MG/DL (ref 70–99)
GLUCOSE BLDC GLUCOMTR-MCNC: 92 MG/DL (ref 70–99)
PHOSPHATE SERPL-MCNC: 2.4 MG/DL (ref 2.5–4.5)
POTASSIUM BLD-SCNC: 3.2 MMOL/L (ref 3.4–5.3)

## 2022-08-16 PROCEDURE — 84100 ASSAY OF PHOSPHORUS: CPT | Performed by: INTERNAL MEDICINE

## 2022-08-16 PROCEDURE — 120N000001 HC R&B MED SURG/OB

## 2022-08-16 PROCEDURE — 84132 ASSAY OF SERUM POTASSIUM: CPT | Performed by: INTERNAL MEDICINE

## 2022-08-16 PROCEDURE — 999N000190 HC STATISTIC VAT ROUNDS

## 2022-08-16 PROCEDURE — 250N000013 HC RX MED GY IP 250 OP 250 PS 637: Performed by: HOSPITALIST

## 2022-08-16 PROCEDURE — 272N000187 HC ACCESSORY CR11

## 2022-08-16 PROCEDURE — 36415 COLL VENOUS BLD VENIPUNCTURE: CPT | Performed by: INTERNAL MEDICINE

## 2022-08-16 PROCEDURE — 99152 MOD SED SAME PHYS/QHP 5/>YRS: CPT

## 2022-08-16 PROCEDURE — 250N000011 HC RX IP 250 OP 636: Performed by: HOSPITALIST

## 2022-08-16 PROCEDURE — 0DH63UZ INSERTION OF FEEDING DEVICE INTO STOMACH, PERCUTANEOUS APPROACH: ICD-10-PCS | Performed by: RADIOLOGY

## 2022-08-16 PROCEDURE — 76000 FLUOROSCOPY <1 HR PHYS/QHP: CPT

## 2022-08-16 PROCEDURE — 250N000009 HC RX 250: Performed by: INTERNAL MEDICINE

## 2022-08-16 PROCEDURE — 250N000009 HC RX 250

## 2022-08-16 PROCEDURE — 258N000003 HC RX IP 258 OP 636: Performed by: INTERNAL MEDICINE

## 2022-08-16 PROCEDURE — 250N000011 HC RX IP 250 OP 636: Performed by: INTERNAL MEDICINE

## 2022-08-16 PROCEDURE — 99233 SBSQ HOSP IP/OBS HIGH 50: CPT | Performed by: INTERNAL MEDICINE

## 2022-08-16 PROCEDURE — 250N000011 HC RX IP 250 OP 636: Performed by: NURSE PRACTITIONER

## 2022-08-16 PROCEDURE — 258N000003 HC RX IP 258 OP 636: Performed by: HOSPITALIST

## 2022-08-16 PROCEDURE — 250N000009 HC RX 250: Performed by: NURSE PRACTITIONER

## 2022-08-16 RX ORDER — FENTANYL CITRATE 50 UG/ML
25-50 INJECTION, SOLUTION INTRAMUSCULAR; INTRAVENOUS EVERY 5 MIN PRN
Status: DISCONTINUED | OUTPATIENT
Start: 2022-08-16 | End: 2022-08-16 | Stop reason: HOSPADM

## 2022-08-16 RX ORDER — NALOXONE HYDROCHLORIDE 0.4 MG/ML
0.2 INJECTION, SOLUTION INTRAMUSCULAR; INTRAVENOUS; SUBCUTANEOUS
Status: DISCONTINUED | OUTPATIENT
Start: 2022-08-16 | End: 2022-08-16 | Stop reason: HOSPADM

## 2022-08-16 RX ORDER — FLUMAZENIL 0.1 MG/ML
0.2 INJECTION, SOLUTION INTRAVENOUS
Status: DISCONTINUED | OUTPATIENT
Start: 2022-08-16 | End: 2022-08-16 | Stop reason: HOSPADM

## 2022-08-16 RX ORDER — WATER 10 ML/10ML
INJECTION INTRAMUSCULAR; INTRAVENOUS; SUBCUTANEOUS
Status: COMPLETED
Start: 2022-08-16 | End: 2022-08-16

## 2022-08-16 RX ORDER — POTASSIUM CHLORIDE 7.45 MG/ML
10 INJECTION INTRAVENOUS
Status: COMPLETED | OUTPATIENT
Start: 2022-08-16 | End: 2022-08-16

## 2022-08-16 RX ORDER — NALOXONE HYDROCHLORIDE 0.4 MG/ML
0.4 INJECTION, SOLUTION INTRAMUSCULAR; INTRAVENOUS; SUBCUTANEOUS
Status: DISCONTINUED | OUTPATIENT
Start: 2022-08-16 | End: 2022-08-16 | Stop reason: HOSPADM

## 2022-08-16 RX ORDER — LIDOCAINE HYDROCHLORIDE 20 MG/ML
JELLY TOPICAL ONCE
Status: COMPLETED | OUTPATIENT
Start: 2022-08-16 | End: 2022-08-16

## 2022-08-16 RX ORDER — CARBOXYMETHYLCELLULOSE SODIUM 5 MG/ML
1 SOLUTION/ DROPS OPHTHALMIC 4 TIMES DAILY PRN
Status: DISCONTINUED | OUTPATIENT
Start: 2022-08-16 | End: 2022-10-04 | Stop reason: HOSPADM

## 2022-08-16 RX ADMIN — LIDOCAINE HYDROCHLORIDE 19 ML: 10 INJECTION, SOLUTION INFILTRATION; PERINEURAL at 15:30

## 2022-08-16 RX ADMIN — MEROPENEM 2 G: 1 INJECTION, POWDER, FOR SOLUTION INTRAVENOUS at 12:19

## 2022-08-16 RX ADMIN — MEROPENEM 2 G: 1 INJECTION, POWDER, FOR SOLUTION INTRAVENOUS at 19:52

## 2022-08-16 RX ADMIN — LEVETIRACETAM 1000 MG: 10 INJECTION INTRAVENOUS at 09:16

## 2022-08-16 RX ADMIN — POTASSIUM CHLORIDE 10 MEQ: 7.46 INJECTION, SOLUTION INTRAVENOUS at 19:29

## 2022-08-16 RX ADMIN — VANCOMYCIN HYDROCHLORIDE 125 MG: KIT at 23:38

## 2022-08-16 RX ADMIN — POTASSIUM CHLORIDE 10 MEQ: 7.46 INJECTION, SOLUTION INTRAVENOUS at 22:46

## 2022-08-16 RX ADMIN — POTASSIUM CHLORIDE 10 MEQ: 7.46 INJECTION, SOLUTION INTRAVENOUS at 20:35

## 2022-08-16 RX ADMIN — ACETAMINOPHEN 650 MG: 325 SUSPENSION ORAL at 23:38

## 2022-08-16 RX ADMIN — TOPIRAMATE 50 MG: 50 TABLET ORAL at 23:38

## 2022-08-16 RX ADMIN — POTASSIUM CHLORIDE 10 MEQ: 7.46 INJECTION, SOLUTION INTRAVENOUS at 21:30

## 2022-08-16 RX ADMIN — ALTEPLASE 2 MG: 2.2 INJECTION, POWDER, LYOPHILIZED, FOR SOLUTION INTRAVENOUS at 18:01

## 2022-08-16 RX ADMIN — FENTANYL CITRATE 25 MCG: 50 INJECTION, SOLUTION INTRAMUSCULAR; INTRAVENOUS at 15:20

## 2022-08-16 RX ADMIN — WATER 10 ML: 1 INJECTION INTRAMUSCULAR; INTRAVENOUS; SUBCUTANEOUS at 17:45

## 2022-08-16 RX ADMIN — MIDAZOLAM HYDROCHLORIDE 0.5 MG: 1 INJECTION, SOLUTION INTRAMUSCULAR; INTRAVENOUS at 15:20

## 2022-08-16 RX ADMIN — ALTEPLASE 2 MG: 2.2 INJECTION, POWDER, LYOPHILIZED, FOR SOLUTION INTRAVENOUS at 16:37

## 2022-08-16 RX ADMIN — ALTEPLASE 2 MG: 2.2 INJECTION, POWDER, LYOPHILIZED, FOR SOLUTION INTRAVENOUS at 16:38

## 2022-08-16 RX ADMIN — LIDOCAINE HYDROCHLORIDE 6 ML: 20 JELLY TOPICAL at 14:20

## 2022-08-16 RX ADMIN — MEROPENEM 2 G: 1 INJECTION, POWDER, FOR SOLUTION INTRAVENOUS at 04:15

## 2022-08-16 RX ADMIN — SODIUM PHOSPHATE, MONOBASIC, MONOHYDRATE 9 MMOL: 276; 142 INJECTION, SOLUTION INTRAVENOUS at 19:52

## 2022-08-16 RX ADMIN — Medication 1 DROP: at 21:57

## 2022-08-16 RX ADMIN — LEVETIRACETAM 1000 MG: 10 INJECTION INTRAVENOUS at 21:46

## 2022-08-16 RX ADMIN — VANCOMYCIN HYDROCHLORIDE 750 MG: 1 INJECTION, POWDER, LYOPHILIZED, FOR SOLUTION INTRAVENOUS at 09:52

## 2022-08-16 RX ADMIN — OXCARBAZEPINE 450 MG: 300 SUSPENSION ORAL at 23:38

## 2022-08-16 RX ADMIN — VANCOMYCIN HYDROCHLORIDE 750 MG: 1 INJECTION, POWDER, LYOPHILIZED, FOR SOLUTION INTRAVENOUS at 22:03

## 2022-08-16 ASSESSMENT — ACTIVITIES OF DAILY LIVING (ADL)
ADLS_ACUITY_SCORE: 48
ADLS_ACUITY_SCORE: 44
ADLS_ACUITY_SCORE: 44
ADLS_ACUITY_SCORE: 48
ADLS_ACUITY_SCORE: 44
ADLS_ACUITY_SCORE: 48
ADLS_ACUITY_SCORE: 44
ADLS_ACUITY_SCORE: 48
ADLS_ACUITY_SCORE: 44

## 2022-08-16 NOTE — PROVIDER NOTIFICATION
"Paged Dr. Grover at [phone]: \"Can you please order eye gtts for pt\"      Paged Dr. Lexx Rosales at [phone] , \"Can you order eye gtts for patient.. \"  "

## 2022-08-16 NOTE — CONSULTS
IR    Request reviewed.  Will place gastrostomy tube.  Plan/hope is for today pending logistical considerations.    Joshua Coulter

## 2022-08-16 NOTE — PROVIDER NOTIFICATION
"Paged Dietician, \"Pt had PEG tube placed. After NPO for 6 hours should I start at goal or a lower rate?\"  "

## 2022-08-16 NOTE — PROVIDER NOTIFICATION
"MD Notification    Notified Person: MD    Notified Person Name: Dr. Grover    Notification Date/Time: 8/16/22 1010    Notification Interaction: VocVeotag Messaging    Purpose of Notification:  \"FYI pts tube feed became clogged, ordered clog zapper to try and unclog it. If unclog okay to resume tube feeds? Thanks\"    Addendum 1229:  \"We tried to flush the tube feed twice with clog zapper and it was unsuccessful. Would you like to order an xray?  Pts picc line is also unable to be drawn from. Can you also order 3 (2mg) doses of Cath Jayden? Thanks\"    Orders Received: Alteplase for one line. Xray for tube feeding placement.    Addendum 1310:   \"Can I get two more orders for the other two lines. She also is not able to get her oral Vanco, can you please also switch Protonix to IV, please advice.\"    Comments: No new orders recieved    "

## 2022-08-16 NOTE — PROVIDER NOTIFICATION
MD Notification    Notified Person: MD    Notified Person Name: Marcos Leigh    Notification Date/Time: 0025    Notification Interaction: Amcom    Purpose of Notification:   Pt has an NG tube that is clogged, not flushing at all. Multiple attempts from different nurses, still not working.  Please Advise  *[phone]    Orders Received:     Comments: HOLD NG feeding this shift

## 2022-08-16 NOTE — PHARMACY-VANCOMYCIN DOSING SERVICE
Pharmacy Vancomycin Note  Date of Service August 15, 2022  Patient's  1945   77 year old, female    Indication: Abscess and Meningitis  Day of Therapy: 26  Current vancomycin regimen:  1000 mg IV q12h  Current vancomycin monitoring method: Trough (Method 1 = dosing nomogram)  Current vancomycin therapeutic monitoring goal: 15-20 mg/L    Current estimated CrCl = Estimated Creatinine Clearance: 101.4 mL/min (A) (based on SCr of 0.38 mg/dL (L)).    Creatinine for last 3 days  2022:  5:58 AM Creatinine 0.44 mg/dL  2022:  5:02 AM Creatinine 0.44 mg/dL  8/15/2022:  5:48 AM Creatinine 0.38 mg/dL    Recent Vancomycin Levels (past 3 days)  2022:  9:55 AM Vancomycin 20.7 mg/L  8/15/2022:  8:36 PM Vancomycin 24.7 mg/L    Vancomycin IV Administrations (past 72 hours)                   vancomycin (VANCOCIN) 1000 mg in dextrose 5% 200 mL PREMIX (mg) 1,000 mg New Bag 08/15/22 1110     1,000 mg New Bag 22 2208     1,000 mg New Bag  1106     1,000 mg New Bag 22 2124     1,000 mg New Bag  1057     1,000 mg New Bag 22 2146                Nephrotoxins and other renal medications (From now, onward)    Start     Dose/Rate Route Frequency Ordered Stop    08/15/22 0000  vancomycin        Note to Pharmacy: Check twice weekly creatinine and vancomycin levels. Dosing per Pharm D based on AUC       08/15/22 1505 22 2359    22 0900  vancomycin (FIRVANQ) oral solution 125 mg         125 mg Oral or Feeding Tube 4 TIMES DAILY 22 0819      22 0500  vancomycin (VANCOCIN) 1000 mg in dextrose 5% 200 mL PREMIX         1,000 mg  200 mL/hr over 1 Hours Intravenous EVERY 12 HOURS 22 2242               Contrast Orders - past 72 hours (72h ago, onward)    None          Interpretation of levels and current regimen:  Vancomycin level is reflective of supratherapeutic level    Has serum creatinine changed greater than 50% in last 72 hours: No    Urine output:  diminished    Renal Function:  Stable    Plan:  1. Decrease Dose to 750 mg IV q12h  2. Vancomycin monitoring method: Trough (Method 1 = dosing nomogram)  3. Vancomycin therapeutic monitoring goal: 15-20 mg/L  4. Pharmacy will check vancomycin levels as appropriate in 1-3 Days.  5. Serum creatinine levels will be ordered a minimum of twice weekly.    Kena Calvillo, SofieD

## 2022-08-16 NOTE — PROGRESS NOTES
[facility]    Medicine Progress Note - Hospitalist Service    Date of Admission:  7/27/2022    Assessment & Plan          Summary of Stay:  Julisa Chaney is a 77-year-old woman with medical history which includes trigeminal neuralgia who was recently admitted to [facility] on 7/19 for severe sepsis(had leukocytosis, lactic acidosis and elevated procalcitonin) due to COVID-19 infection and suspected bacterial pneumonia. Recent COVID-19 infection which was diagnosed on 7/19/2022 and was treated with 5 days of remdesivir and did not require any steroids and she was treated with IV vancomycin and IV meropenem.     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  She was switched to IV vancomycin, cefepime and metronidazole and transferred to [facility] for neurosurgery assessment. MRI brain w/wo contrast 7/30 showed ventriculitis with probable abscess. Neurosurgery/neurology/infectious disease/oncology consulted. Patient taken to the OR on 7/31 for left frontal ventriculostomy.   Since then has remained on broad spectrum antibiotics.  In addition she has developed C. Difficile colitis and is on treatment.  She has continued to have a significant encephalopathy.    Severe sepsis secondary to ventriculitis with abscess  Status left frontal ventriculostomy 7/31/22:  -Appreciate neurosurgery, oncology, infectious disease service assistance.  -Neurosurgery and oncology were consulted and flow cytometry was done which did not show any evidence of malignancy.  -Patient underwent left frontal ventriculostomy on 7/31/2022. Post-op management per neurosurgery team.  -CSF sent on 8/5 which revealed , , glucose 98 and protein 84.  WBC this AM 25, . CSF 2cc for repeat labs. CSF with sediment. EVD was clamped on 8/6 and removed on 8/8.  -Was on IV vancomycin, cefepime, and metronidazole. ID planning 6 weeks of treatment with current regimen - unable to de-escalate as cultures have been negative.  Daughter concerned about the cefepime. Patient apparently had similar symptoms of lethargy and confusion when on rocephin in February 2022 which improved after rocephin was discontinued. Unclear if rocephin was the etiology at that time, symptoms may have been due to her illness. Daughter would like to try a different antibiotic if possible. Following this ID stopped cefepime and metronidazole and started meropenem.  Also remained on vancomycin.     8/16 Update:  -Discussed with the patient, family and IR: Will have feeding tube placed  -  Continue meropenem, vancomycin IV.  Plan for repeat brain imaging (If patient agrees).   -  Clinically improving. She has followed commands on occasion to move toes of fingers/hands.    -Continue ID follow-up      Acute infectious encephalopathy due to above:  Appeared to become more alert but has not really made progress beyond that now for a few days.  She continues to rarely speak and doesn't consistently follow commands.    -Continue to treat underlying issues as noted above.    -Maintain normal sleep/wake cycle as able.  -Avoid opioid pain medications as able.  -Concern about lack of improvement with mental status as noted above, re-image on Monday.     C. difficile colitis:  Patient was noted to have copious diarrhea on 8/1 and sample came back positive for C. Difficile.  -Continue oral vancomycin.  Will need a prolonged treatment given other abx use.  -Rectal tube still in place, liquid stool output still variable and at times fairly significant.  Will leave in place today.  -ID following as noted above.     Acute on chronic anemia:  Prior labs show baseline hemoglobin of about 9-10. Hemoglobin on  admission was 11.7. Has slowly trended down, now low enough to recommend PRBC transfusion on 8/8/22. No obvious ongoing bleeding noted. WBC and platelets - normal-elevated. Iron panel on 7/30/22 showed that her total iron was low at 14, binding capacity was low at 28, iron saturation was 6 and B12 was high and folate was normal.  -Repeat hemoglobin 8/8 lower at 6.4, then 6.9 on recheck. Transfused one unit PRBCs on 8/8/22.  -Hemoglobin stable/improved at 9.1 on 8/12/22.  She is iron deficient, received IV iron infusion x3 with first on 8/11/22.  -Continue to monitor, hgb stable above 8.  Recheck tomorrow.     Sinus tachycardia:  -  Overall improved, still around 100 at times.  Treat underlying issues as noted.  Continue to monitor.     History of trigeminal neuralgia  -Continue PTA Trileptal and Topamax.     History of chronic pyloric ulcer  She had EGD done in 2020 which showed gastroduodenitis with a duodenal stricture.  -Continue PPI.     Hyperglycemia  Recent HbA1c was 5.6 on 7/19/22. Was not on any medication prior to admission.  -Blood sugars mildly elevated but overall fairly well controlled.  -Continue medium dose sliding scale insulin every 4 hours.    Recent Labs   Lab 08/16/22  1226 08/16/22  0805 08/16/22  0505 08/16/22  0043 08/15/22  2118 08/15/22  1611   GLC 82 92 103* 116* 127* 89     -Decrease Lantus to 10 units at bedtime 8/16     Intermittent Hypokalemia, Hyponatremia, Hypophosphatemia:  -Recheck and replace per protocol.  -Sodium down to 132 on 8/11/22, decreased free water flushes to 75 ml every 4 hours and now improved to 136 on 8/13/22.  -Continue to follow trend.     Severe protein calorie malnutrition:  -Dietitian consulted.  Currently getting tube feedings and free water flushes via NG feeding tube.  -Will need SLP consult when more able to participate.  If not making more neuro progress by Monday/Tuesday consider as part of goals of care discussions longer term FT placement.     Physical  "deconditioning:  -Patient seems severely deconditioned.  -PT/OT as able, not really able to substantially participate at this point due to her mental status.        Diet: Adult Formula Drip Feeding: Continuous Jevity 1.5; Nasogastric tube; Goal Rate: 50; mL/hr; Medication - Feeding Tube Flush Frequency: At least 15-30 mL water before and after medication administration and with tube clogging; Amount to Send (Nutrit...    DVT Prophylaxis: Pneumatic Compression Devices  Abrams Catheter: Not present  Central Lines: PRESENT  PICC Triple Lumen 07/22/22 Right Basilic Vascular access-Site Assessment: WDL  Cardiac Monitoring: ACTIVE order. Indication: ICU  Code Status: Full Code      Disposition Plan      Expected Discharge Date: 08/19/2022        Discharge Comments: Needs NG feeding tube placed        The patient's care was discussed with the Bedside Nurse and Patient.    Timmy Grover MD, MD  Hospitalist Service  [facility]  Securely message with the Vocera Web Console (learn more here)  Text page via [employer] Paging/Directory     \"This dictation was performed with voice recognition software and may contain errors,  omissions and inadvertent word substitution.\"     Clinically Significant Risk Factors Present on Admission                    ______________________________________________________________________    Interval History   Doing okay.  Feeding tube is clogged today.  Patient agrees to have G/GJ placed  4 point ROS done and negative unless mentioned    Data reviewed today: I reviewed all medications, new labs and imaging results over the last 24 hours. I personally reviewed no images or EKG's today.    Physical Exam   /72 (BP Location: Left arm)   Pulse 104   Temp 98  F (36.7  C) (Oral)   Resp 16   Wt 51.8 kg (114 lb 4.8 oz)   SpO2 95%   BMI 19.02 kg/m    Gen- pleasant  lying in bed  HEENT-  head incision C/d/i further as per neurosurgery  CVS- I+II+ no m/r/g  RS- CTAB  Abdo- " soft, no tenderness. No g/r/r, BS+ no hepatosplenomegaly   Ext- no edema   CNS: She knows she is in the hospital.  Obeying instructions    Data    BMP  Recent Labs   Lab 08/16/22  1226 08/16/22  1100 08/16/22  0805 08/16/22  0505 08/16/22  0043 08/15/22  0749 08/15/22  0548 08/14/22  0808 08/14/22  0502 08/13/22  0759 08/13/22  0558 08/12/22  0818 08/12/22  0516   NA  --   --   --   --   --   --  137  --  140  --  136  --  137   POTASSIUM  --  3.2*  --   --   --   --  3.7  --  3.5  3.5  --  3.5  --  3.6   CHLORIDE  --   --   --   --   --   --  107  --  106  --  104  --  104   ADY  --   --   --   --   --   --  9.1  --  8.9  --  8.8  --  8.6   CO2  --   --   --   --   --   --  27  --  28  --  28  --  28   BUN  --   --   --   --   --   --  15  --  12  --  13  --  12   CR  --   --   --   --   --   --  0.38*  --  0.44*  --  0.44*  --  0.37*   GLC 82  --  92 103* 116*   < > 159*   < > 145*   < > 166*   < > 195*    < > = values in this interval not displayed.     CBC  Recent Labs   Lab 08/13/22  0558 08/12/22  0516 08/11/22  0714 08/10/22  0417   WBC 8.4 9.5 10.0 9.8   RBC 3.92 4.14 4.17 3.90   HGB 8.5* 9.1* 9.1* 8.6*   HCT 30.0* 31.0* 30.2* 29.1*   MCV 77* 75* 72* 75*   MCH 21.7* 22.0* 21.8* 22.1*   MCHC 28.3* 29.4* 30.1* 29.6*   RDW 21.1* 20.2* 20.2* 19.5*   * 588* 540* 491*     INRNo lab results found in last 7 days.  LFTs  No lab results found in last 7 days.   PANCNo lab results found in last 7 days.    No results found for this or any previous visit (from the past 24 hour(s)).

## 2022-08-16 NOTE — PLAN OF CARE
Goal Outcome Evaluation:    Plan of Care Reviewed With: patient     Overall Patient Progress: improving    Reason for Admission: POD 16 L ventriculostomy placement, POD 8 ventriculostomy removal s/p ventriculitis with abscess     Cognitive/Mentation: Unable to assess, pt follows commands inconsistently  Neuros/CMS: Intact ex R side weaker than L, weak pulses 1+, dry crusty bilateral eye drainage.  VS: VSS. SBP goal<150.   Tele: Sinus tachy with PAC and PVCs  GI: BS audible. Fecal incontinence containment device. (+) for C-diff.   : Purwick. Incontinent.  Pulmonary: LS diminished in lower lobes.  Pain: none.      Drains/Lines: R PICC with no blood return. Cath Jayden ordered.  Skin: Scattered bruises, rash on khushbu area, barrier cream used, mepilex in place.   Activity: Assist x 2 with lift. T/R Q2h.  Diet: NPO. TF order 50ml/hr, Free water flush 75ml Q4h. TF clogged, attempted to use clog zapper 2x, unsuccessful.      Therapies recs: Pending  Discharge: Pending     Aggression Stoplight Tool: GREEN     End of shift summary: MRSA- contact isolation maintained, C-diff- enteric isolation maintained. TF clogged, attempted to use clog zapper 2x, unsuccessful. TF placement xray pending, IR placement of PEG tube pending. Pt also refusing follow-up MRI.

## 2022-08-16 NOTE — PLAN OF CARE
Goal Outcome Evaluation:    Plan of Care Reviewed With: patient     Overall Patient Progress: no change       Pt doesn't follow commands. Difficult to assess neuro. Lethargic post PEG placement. A/Ox2. NPO. PEG placed. NPO for 6 hours. Alteplase given--PICC now has blood return. Potassium & phosp replacement ordered for IV.   Pt refused MRI.

## 2022-08-16 NOTE — PLAN OF CARE
[id number]  Pt here with Brain abscess. A&O x2, disoriented to time and situation. Neuros slow and whispers speech. VSS.  R PICC patent and intact. Tele Sinu tachy. NPO, NG in place but clogged, feeding on HOLD this shift.  Ex cath in place, rectal tube in place and patent. Up with A2/lift. Denies pain. Pt scoring yellow on the Aggression Stop Light Tool. Plan is to decide on possible permanent TF placement. Discharge pending.

## 2022-08-16 NOTE — IR NOTE
RADIOLOGY POST PROCEDURE NOTE    Patient name: Julisa Chaney  MRN: [id number]  : 1945    Pre-procedure diagnosis: 18 Fr YOLI Gastrostomy Tube  Post-procedure diagnosis: Same    Procedure Date/Time: 2022  3:47 PM  Procedure: 18 Fr YOLI Gastrostomy tube and X 2 T Tacs.  Per routine, tube may be used after 6 hours if vital signs are normal and no evidence of complication (peritoneal signs).  Estimated blood loss: 8 ml  Specimen(s) collected with description: none    The patient tolerated the procedure well with no immediate complications.  Significant findings:  Please see above    See imaging dictation for procedural details.    Provider name: Joshua Tavares MD  Assistant(s):None

## 2022-08-16 NOTE — PROGRESS NOTES
RADIOLOGY PROCEDURE NOTE  Patient name: Julisa Chaney  MRN: [id number]  : 1945    Pre-procedure diagnosis: NG tube plugged  Post-procedure diagnosis: Same    Procedure Date/Time: 2022  3:12 PM  Procedure: NG tube unplugged with wire and water  Estimated blood loss: None  Specimen(s) collected with description: none  The patient tolerated the procedure well with no immediate complications.  Significant findings:none    See imaging dictation for procedural details.    Provider name: Miguel Wolfe PA-C  Assistant(s):None

## 2022-08-16 NOTE — PROGRESS NOTES
Patient is on IR schedule today Tuesday 8/16/22 for a Gastrostomy tube placement .     -Labs WNL for procedure.    -Orders for NPO have been entered.   -Phone consent was obtained from daughter Alissa Del Real after explaining the procedure, risks and benefits and consent is in IR.     Please contact the IR department [place] [phone] for procedural related questions.     Thanks, Rylie [facility] Interventional Radiology CNP ([phone]) (phone [phone])

## 2022-08-16 NOTE — IR NOTE
Interventional Radiology Intra-procedural Nursing Note    Patient Name: Julisa Chaney  Medical Record Number: [id number]  Today's Date: August 16, 2022    Procedure: Gastrostomy tube placement .   Start time: 15:15  End time: 15:26  Report provided to: Sabina SAMUELS  Patient depart time and location: 15:39 to 719    Note: Patient entered Interventional Radiology Suite number 1 via cart. Patient awake, alert and orientated. Assisted onto procedural table in supine position. Prepped and draped.  Dr. Tavares in room. Time out and procedure started. Vital signs stable. Telemetry reading sinus tachycardia.    Procedure well tolerated by patient without complications. Procedure end with debrief by Dr. Tavares.  Manual pressure applied until hemostasis achieved. Island dressing applied to LUQ interventional procedure access site.    Administered medication totals:  Lidocaine 1% 19 mL Intradermal  Versed 0.5 mg IVP  Fentanyl 25 mcg IVP

## 2022-08-16 NOTE — PROGRESS NOTES
Consult placed for Vascular access regarding no blood return.    ADDENDUM: Per IV team get an order for alteplase for all three lines    ADDENDUM2: Retimed alteplase since pt to go to IR for procedure.

## 2022-08-17 ENCOUNTER — APPOINTMENT (OUTPATIENT)
Dept: CT IMAGING | Facility: CLINIC | Age: 77
DRG: 023 | End: 2022-08-17
Attending: INTERNAL MEDICINE
Payer: COMMERCIAL

## 2022-08-17 LAB
CREAT SERPL-MCNC: 0.46 MG/DL (ref 0.52–1.04)
GFR SERPL CREATININE-BSD FRML MDRD: >90 ML/MIN/1.73M2
GLUCOSE BLDC GLUCOMTR-MCNC: 122 MG/DL (ref 70–99)
GLUCOSE BLDC GLUCOMTR-MCNC: 139 MG/DL (ref 70–99)
GLUCOSE BLDC GLUCOMTR-MCNC: 150 MG/DL (ref 70–99)
GLUCOSE BLDC GLUCOMTR-MCNC: 173 MG/DL (ref 70–99)
GLUCOSE BLDC GLUCOMTR-MCNC: 174 MG/DL (ref 70–99)
PHOSPHATE SERPL-MCNC: 2.8 MG/DL (ref 2.5–4.5)
POTASSIUM BLD-SCNC: 3.5 MMOL/L (ref 3.4–5.3)
VANCOMYCIN SERPL-MCNC: 24.9 MG/L

## 2022-08-17 PROCEDURE — 84132 ASSAY OF SERUM POTASSIUM: CPT | Performed by: INTERNAL MEDICINE

## 2022-08-17 PROCEDURE — 250N000011 HC RX IP 250 OP 636: Performed by: INTERNAL MEDICINE

## 2022-08-17 PROCEDURE — 250N000013 HC RX MED GY IP 250 OP 250 PS 637: Performed by: HOSPITALIST

## 2022-08-17 PROCEDURE — 99232 SBSQ HOSP IP/OBS MODERATE 35: CPT | Performed by: SPECIALIST

## 2022-08-17 PROCEDURE — 84100 ASSAY OF PHOSPHORUS: CPT | Performed by: INTERNAL MEDICINE

## 2022-08-17 PROCEDURE — 80202 ASSAY OF VANCOMYCIN: CPT | Performed by: INTERNAL MEDICINE

## 2022-08-17 PROCEDURE — 82565 ASSAY OF CREATININE: CPT | Performed by: INTERNAL MEDICINE

## 2022-08-17 PROCEDURE — 250N000009 HC RX 250: Performed by: INTERNAL MEDICINE

## 2022-08-17 PROCEDURE — 99233 SBSQ HOSP IP/OBS HIGH 50: CPT | Performed by: INTERNAL MEDICINE

## 2022-08-17 PROCEDURE — 258N000003 HC RX IP 258 OP 636: Performed by: INTERNAL MEDICINE

## 2022-08-17 PROCEDURE — 70470 CT HEAD/BRAIN W/O & W/DYE: CPT

## 2022-08-17 PROCEDURE — G0463 HOSPITAL OUTPT CLINIC VISIT: HCPCS

## 2022-08-17 PROCEDURE — 250N000011 HC RX IP 250 OP 636: Performed by: HOSPITALIST

## 2022-08-17 PROCEDURE — 120N000001 HC R&B MED SURG/OB

## 2022-08-17 PROCEDURE — 250N000013 HC RX MED GY IP 250 OP 250 PS 637: Performed by: INTERNAL MEDICINE

## 2022-08-17 PROCEDURE — 258N000003 HC RX IP 258 OP 636: Performed by: HOSPITALIST

## 2022-08-17 PROCEDURE — 999N000190 HC STATISTIC VAT ROUNDS

## 2022-08-17 RX ORDER — MICONAZOLE NITRATE 20 MG/G
CREAM TOPICAL 2 TIMES DAILY
Status: DISCONTINUED | OUTPATIENT
Start: 2022-08-17 | End: 2022-10-04 | Stop reason: HOSPADM

## 2022-08-17 RX ORDER — MICONAZOLE NITRATE 20 MG/G
CREAM TOPICAL 2 TIMES DAILY PRN
Status: DISCONTINUED | OUTPATIENT
Start: 2022-08-17 | End: 2022-10-04 | Stop reason: HOSPADM

## 2022-08-17 RX ORDER — DIPHENHYDRAMINE HYDROCHLORIDE 50 MG/ML
25 INJECTION INTRAMUSCULAR; INTRAVENOUS EVERY 6 HOURS PRN
Status: DISCONTINUED | OUTPATIENT
Start: 2022-08-17 | End: 2022-08-23

## 2022-08-17 RX ORDER — DIPHENHYDRAMINE HCL 12.5MG/5ML
25 LIQUID (ML) ORAL EVERY 6 HOURS PRN
Status: DISCONTINUED | OUTPATIENT
Start: 2022-08-17 | End: 2022-08-23

## 2022-08-17 RX ORDER — IOPAMIDOL 755 MG/ML
75 INJECTION, SOLUTION INTRAVASCULAR ONCE
Status: COMPLETED | OUTPATIENT
Start: 2022-08-17 | End: 2022-08-17

## 2022-08-17 RX ORDER — DIPHENHYDRAMINE HCL 25 MG
25 CAPSULE ORAL EVERY 6 HOURS PRN
Status: DISCONTINUED | OUTPATIENT
Start: 2022-08-17 | End: 2022-08-17

## 2022-08-17 RX ADMIN — VANCOMYCIN HYDROCHLORIDE 750 MG: 1 INJECTION, POWDER, LYOPHILIZED, FOR SOLUTION INTRAVENOUS at 10:49

## 2022-08-17 RX ADMIN — ACETAMINOPHEN 650 MG: 325 SUSPENSION ORAL at 22:28

## 2022-08-17 RX ADMIN — MEROPENEM 2 G: 1 INJECTION, POWDER, FOR SOLUTION INTRAVENOUS at 20:17

## 2022-08-17 RX ADMIN — MEROPENEM 2 G: 1 INJECTION, POWDER, FOR SOLUTION INTRAVENOUS at 04:08

## 2022-08-17 RX ADMIN — DIPHENHYDRAMINE HYDROCHLORIDE 25 MG: 25 SOLUTION ORAL at 16:45

## 2022-08-17 RX ADMIN — ACETAMINOPHEN 650 MG: 325 SUSPENSION ORAL at 16:47

## 2022-08-17 RX ADMIN — Medication 40 MG: at 08:40

## 2022-08-17 RX ADMIN — Medication 15 ML: at 08:40

## 2022-08-17 RX ADMIN — SODIUM CHLORIDE 60 ML: 900 INJECTION INTRAVENOUS at 17:35

## 2022-08-17 RX ADMIN — VANCOMYCIN HYDROCHLORIDE 125 MG: KIT at 12:13

## 2022-08-17 RX ADMIN — LEVETIRACETAM 1000 MG: 10 INJECTION INTRAVENOUS at 10:23

## 2022-08-17 RX ADMIN — MICONAZOLE NITRATE: 20 CREAM TOPICAL at 20:17

## 2022-08-17 RX ADMIN — Medication 1 PACKET: at 12:02

## 2022-08-17 RX ADMIN — VANCOMYCIN HYDROCHLORIDE 125 MG: KIT at 22:28

## 2022-08-17 RX ADMIN — MEROPENEM 2 G: 1 INJECTION, POWDER, FOR SOLUTION INTRAVENOUS at 12:13

## 2022-08-17 RX ADMIN — VANCOMYCIN HYDROCHLORIDE 125 MG: KIT at 08:41

## 2022-08-17 RX ADMIN — INSULIN ASPART 1 UNITS: 100 INJECTION, SOLUTION INTRAVENOUS; SUBCUTANEOUS at 20:17

## 2022-08-17 RX ADMIN — Medication 1 PACKET: at 18:37

## 2022-08-17 RX ADMIN — ACETAMINOPHEN 650 MG: 325 SUSPENSION ORAL at 12:05

## 2022-08-17 RX ADMIN — IOPAMIDOL 75 ML: 755 INJECTION, SOLUTION INTRAVENOUS at 17:34

## 2022-08-17 RX ADMIN — Medication 1 DROP: at 22:27

## 2022-08-17 RX ADMIN — Medication 1 DROP: at 12:05

## 2022-08-17 RX ADMIN — INSULIN ASPART 1 UNITS: 100 INJECTION, SOLUTION INTRAVENOUS; SUBCUTANEOUS at 17:02

## 2022-08-17 RX ADMIN — ACETAMINOPHEN 650 MG: 325 SUSPENSION ORAL at 04:08

## 2022-08-17 RX ADMIN — VANCOMYCIN HYDROCHLORIDE 125 MG: KIT at 18:37

## 2022-08-17 RX ADMIN — Medication 1 PACKET: at 08:40

## 2022-08-17 RX ADMIN — INSULIN ASPART 1 UNITS: 100 INJECTION, SOLUTION INTRAVENOUS; SUBCUTANEOUS at 12:09

## 2022-08-17 RX ADMIN — LEVETIRACETAM 1000 MG: 10 INJECTION INTRAVENOUS at 21:54

## 2022-08-17 RX ADMIN — TOPIRAMATE 50 MG: 50 TABLET ORAL at 22:28

## 2022-08-17 RX ADMIN — OXCARBAZEPINE 450 MG: 300 SUSPENSION ORAL at 22:28

## 2022-08-17 ASSESSMENT — ACTIVITIES OF DAILY LIVING (ADL)
ADLS_ACUITY_SCORE: 44

## 2022-08-17 NOTE — CONSULTS
"CLINICAL NUTRITION SERVICES - BRIEF NOTE     Nutrition Prescription      Recommendations already ordered by Registered Dietitian (RD):  - advance TF to goal rate of 50 mL/hr today, as tolerated  - continue FWF as ordered or per MD     Received MD consult for \"tube feeding\" and page regarding TF start/advancement post-PEG placement    RD already following, will continue to follow     NEW FINDINGS   Nutrition Support Enteral:  Type of Feeding Tube: G-tube  Enteral Frequency:  Continuous  Enteral Regimen: Jevity 1.5 at 50 mL/hr  Total Enteral Provisions: 1800 kcal (37 kcal/kg), 76 g protein (1.6 g/kg), 912 mL H2O, 258 g CHO, 25 g fiber   Free Water Flush: 75 mL every 4 hours (8/11 per MD)    - 8/16: PEG placed --> TF re-started at 20 mL/hr --> advanced to 35 mL/hr      INTERVENTIONS  Implementation  Enteral Nutrition - continue advancement to goal  Feeding tube flush - continue as ordered or per MD      Monitoring/Evaluation  Will continue to monitor and evaluate per protocol.    Jaylin Matos, RD, LD  "

## 2022-08-17 NOTE — PROVIDER NOTIFICATION
Paged on call hospitalist: Pt had PEG placed today, ok to use at 2330.  Should we restart TF at goal rate or at slower rate?    MD placed orders for Nutrition consult.

## 2022-08-17 NOTE — PLAN OF CARE
"Goal Outcome Evaluation:  Reason for Admission: Brain abscess & severe sepsis to ventriculitis     Cognitive/Mentation: A/Ox 2, disoriented to time and situation  Neuros/CMS: Intact ex lethargic, slow soft speech, L sided weakness. Intermittently follows commands.   VS: stable on RA.   Tele: NSR.  GI: BS active, rectal tube in place with watery/loose stool.  : Voiding adequately. Incontinent, purewick in place.  Pulmonary: LS dim  Pain: c/o \"pain all over\" PRN tylenol admin.     Drains/Lines: RUE PICC intact & SL. PEG tube intact.   Skin: Red/excoriated buttocks-barrier cream applied. Surgical incision WDL & SHERRI.   Activity: Assist x 2 with lift.  Diet: NPO. TF restarted. Meds via PEG.     Therapies recs: pending  Discharge: pending medical stability    Aggression Stoplight Tool: green    End of shift summary: Pt continues to be lethargic and unable to complete full assessment. TF restarted initially at 20mL/hr, advanced to 35mL/hr @ 0400 per order on 8/11. Repo Q2, frequent skin care to buttocks. Replaced K+ and Phos, levels rechecked-WDL.                      "

## 2022-08-17 NOTE — PROGRESS NOTES
[facility]    Infectious Disease Progress Note    Date of Service : 08/17/2022     Assessment:  Patient transferred 7/27 from [facility] for concern for brain abscess (recovered COVID) - MRI suggestive of ventriculitis with left lateral ventricle abscess. Has been on metronidaole/ceftriaxone/vancomycin, s/p left frontal ventriculostomy  CSF cxs have remained negative. Course complicated by development of C.diff colitis.     Recommendations  1. Continue Meropenem / Vancomycin. Plan 6 week treatment course for brain abscess/ ventriculitis until 9/8  2. Will plan repeating MRI brain W/WO contrast at the end of therapy  3. Treat c.diff for a week following antibiotic discontinuation  OPIV antibiotic orders are in Epic for discharge    Nuzhat Hudson MD    Interval History   Does not open eyes, but answers questions appropriately. Complains of weakness, headache and diarrhea. Had G tube placed.    Physical Exam   Temp: 97.5  F (36.4  C) Temp src: Oral BP: 122/54 Pulse: 93   Resp: 18 SpO2: 95 % O2 Device: None (Room air)    Vitals:    08/13/22 0600 08/14/22 0500 08/15/22 0400   Weight: 59.6 kg (131 lb 6.3 oz) 55.7 kg (122 lb 12.7 oz) 51.8 kg (114 lb 4.8 oz)     Vital Signs with Ranges  Temp:  [97.2  F (36.2  C)-97.8  F (36.6  C)] 97.5  F (36.4  C)  Pulse:  [] 93  Resp:  [16-18] 18  BP: (114-144)/(54-88) 122/54  SpO2:  [91 %-98 %] 95 %    Constitutional: Awake, cooperative, no apparent distress, does not open eyes but answers questions  Lungs: Clear to auscultation   Cardiovascular: S1S2  Abdomen: soft, g tube in place  Skin: No rash  MS : PICC in place    Other:    Medications     - MEDICATION INSTRUCTIONS -         banatrol plus  1 packet Per Feeding Tube TID w/meals     insulin aspart  1-6 Units Subcutaneous Q4H     insulin glargine  10 Units Subcutaneous At Bedtime     levETIRAcetam  1,000 mg Intravenous Q12H     meropenem  2 g Intravenous Q8H     multivitamins w/minerals  15 mL Per Feeding  Tube Daily     OXcarbazepine  450 mg Oral or Feeding Tube At Bedtime     pantoprazole  40 mg Oral or Feeding Tube QAM AC     sodium chloride (PF)  10-40 mL Intracatheter Q7 Days     topiramate  50 mg Oral or Feeding Tube At Bedtime     vancomycin  125 mg Oral or Feeding Tube 4x Daily     vancomycin  750 mg Intravenous Q12H       Data   All microbiology laboratory data reviewed.  Recent Labs   Lab Test 08/13/22  0558 08/12/22  0516 08/11/22  0714   WBC 8.4 9.5 10.0   HGB 8.5* 9.1* 9.1*   HCT 30.0* 31.0* 30.2*   MCV 77* 75* 72*   * 588* 540*     Recent Labs   Lab Test 08/15/22  0548 08/14/22  0502 08/13/22  0558   CR 0.38* 0.44* 0.44*     Recent Labs   Lab Test 08/08/20  0212   SED 13

## 2022-08-17 NOTE — PROVIDER NOTIFICATION
"MD Notification    Notified Person: MD    Notified Person Name: Dr. Grover    Notification Date/Time: 8/17/22 1100    Notification Interaction: [employer] Messaging    Purpose of Notification:  \"Pt is still refusing MRI, would you like to order a CT scan? Pt has variant neuro exam, difficult to assess due to patient not following commands. Presents with L sided weakness sometimes and sometimes R sided weakness, has varied. Thanks\"    Orders Received: CT contrast ordered    Addendum #1 1115: \"Pt has an allergy (rash) to contrast. Can we order Benadryl to give before the scan?\"    Addendum #2 [id number]: \"Can you order if you want to have done? and can you change the ordered PRN Tylenol to be scheduled as well for head pain, pt does not always know how to ask for it. Thanks.\"    Adendu #3 [id number] Web-based Paging: \"CT is waiting to know if we are sending pt down. Please call me back. Thanks\"    Orders Received 1920: PRN oral benadryl or PRN IV benadryl to use only if reaction occurs during CT scan with contrast    "

## 2022-08-17 NOTE — PLAN OF CARE
Goal Outcome Evaluation:    Plan of Care Reviewed With: patient     Overall Patient Progress: no change    Reason for Admission: POD 17 L ventriculostomy placement, POD 9 ventriculostomy removal s/p ventriculitis with abscess     Cognitive/Mentation: Unable to assess, pt follows commands inconsistently  Neuros/CMS: Intact ex R side weaker than L, weak pulses 1+, dry crusty bilateral eye drainage.  VS: VSS. SBP goal<180.   Tele: NSR.  GI: BS audible. Fecal incontinence containment device. (+) for C-diff.   : Purwick. Incontinent.  Pulmonary: LS diminished in lower lobes.  Pain: none.      Drains/Lines: R PICC S.L.  Skin: Scattered bruises, rash on khushbu area, barrier cream used, mepilex in place.   Activity: Assist x 2 with lift. T/R Q2h.  Diet: NPO. PEG-TF order 50ml/hr, Free water flush 30ml Q4h.    Therapies recs: TCU  Discharge: Pending     Aggression Stoplight Tool: GREEN     End of shift summary: MRSA- contact isolation maintained, C-diff- enteric isolation maintained. Admin PRN eye drops for dry eyes. Pt also refusing follow-up MRI, CT with contrast ordered, pt allergic to contrast- used benadryl previously,  notified. Wok consult ordered.

## 2022-08-17 NOTE — PROVIDER NOTIFICATION
"Paged Dr. Grover, \"I talked to the neuro rad and he said pt has to have some type of pretreatment before receiving contrast. OK to give the benedryl prior? \"    ADDENDUM: OK to give benadryl prior to CT.   "

## 2022-08-17 NOTE — CONSULTS
[facility]  WOC Nurse Inpatient Assessment     Consulted for: buttocks       Areas Assessed:      Areas visualized during today's visit: Focused: and Sacrum/coccyx    Wound location: perianal / buttock           Last photo: 8/17  Wound due to: Incontinence Associated Dermatitis (IAD), Moisture Associated Skin Damage (MASD) and Fungal  Wound history/plan of care: found with sacral mepilex over sacrum and rectal tube in place   Wound base:  epidermis and dermis,rash     Palpation of the wound bed: normal      Drainage: small     Description of drainage: serous     Measurements (length x width x depth, in cm): general perianal without focal open area      Tunneling: N/A     Undermining: N/A  Periwound skin: Intact      Color: pink      Temperature: normal   Odor: none  Pain: mild and moderate  Pain interventions prior to dressing change: slow and gentle cares   Treatment goal: Heal   STATUS: initial assessment  Supplies ordered: ordered from pharmacy        Treatment Plan:      Wound care  Start:  08/17/22 1815,   EVERY SHIFT,   Routine        Comments: Location: buttock / perianal   Care: provided qshift by primary RN   1. Cleanse perianal every 2 hours with routine turns, wipe feces leakage from around rectal tube (a small amount is expected)   2. Apply Otf antifungal to perianal and buttock and around rectal tube per MAR BID and PRN, apply after incontinence care            Orders: Written    RECOMMEND PRIMARY TEAM ORDER: None, at this time  Education provided: plan of care, Moisture management and Hygiene  Discussed plan of care with: Patient and Nurse  WOC nurse follow-up plan: weekly  Notify WOC if wound(s) deteriorate.  Nursing to notify the Provider(s) and re-consult the WOC Nurse if new skin concern.    DATA:     Current support surface: Standard  Atmos Air mattress  Containment of urine/stool: Incontinence Protocol, Purewick external catheter  and Internal fecal management  BMI:  Body mass index is 19.02 kg/m .   Active diet order: Orders Placed This Encounter      NPO for Medical/Clinical Reasons Except for: Other; Specify: NPO For oral intake and gastric feedings for 6 hours post insertion Gastrostomy G/GJ tube. May feed through Jejunal or Distal PORT immediately for GJ tubes ONLY.     Output: I/O last 3 completed shifts:  In: 636 [NG/GT:636]  Out: 1400 [Urine:1400]     Labs: Recent Labs   Lab 08/13/22  0558 08/12/22  0516   HGB 8.5* 9.1*   WBC 8.4 9.5   CRP  --  51.8*     Pressure injury risk assessment:   Sensory Perception: 2-->very limited  Moisture: 2-->very moist  Activity: 1-->bedfast  Mobility: 2-->very limited  Nutrition: 3-->adequate  Friction and Shear: 2-->potential problem  Alli Score: 12    Helena COON   Dept. Pager: [phone]  Dept. Office Number: [phone]

## 2022-08-17 NOTE — PROGRESS NOTES
[facility]    Medicine Progress Note - Hospitalist Service    Date of Admission:  7/27/2022    Assessment & Plan          Summary of Stay:  Julisa Chaney is a 77-year-old woman with medical history which includes trigeminal neuralgia who was recently admitted to [facility] on 7/19 for severe sepsis(had leukocytosis, lactic acidosis and elevated procalcitonin) due to COVID-19 infection and suspected bacterial pneumonia. Recent COVID-19 infection which was diagnosed on 7/19/2022 and was treated with 5 days of remdesivir and did not require any steroids and she was treated with IV vancomycin and IV meropenem.     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  She was switched to IV vancomycin, cefepime and metronidazole and transferred to [facility] for neurosurgery assessment. MRI brain w/wo contrast 7/30 showed ventriculitis with probable abscess. Neurosurgery/neurology/infectious disease/oncology consulted. Patient taken to the OR on 7/31 for left frontal ventriculostomy.   Since then has remained on broad spectrum antibiotics.  In addition she has developed C. Difficile colitis and is on treatment.  She has continued to have a significant encephalopathy.    Severe sepsis secondary to ventriculitis with abscess  Status left frontal ventriculostomy 7/31/22:  -Appreciate neurosurgery, oncology, infectious disease service assistance.  -Neurosurgery and oncology were consulted and flow cytometry was done which did not show any evidence of malignancy.  -Patient underwent left frontal ventriculostomy on 7/31/2022. Post-op management per neurosurgery team.  -CSF sent on 8/5 which revealed , , glucose 98 and protein 84.  WBC this AM 25, . CSF 2cc for repeat labs. CSF with sediment. EVD was clamped on 8/6 and removed on 8/8.  -Was on IV vancomycin, cefepime, and metronidazole. ID planning 6 weeks of treatment with current regimen - unable to de-escalate as cultures have been negative.  Daughter concerned about the cefepime. Patient apparently had similar symptoms of lethargy and confusion when on rocephin in February 2022 which improved after rocephin was discontinued. Unclear if rocephin was the etiology at that time, symptoms may have been due to her illness. Daughter would like to try a different antibiotic if possible. Following this ID stopped cefepime and metronidazole and started meropenem.  Also remained on vancomycin.    8/17: D.W ID will get CT as refusing MRI.    8/16: s/p IR G tube placement  -  Continue meropenem, vancomycin IV.  Continue ID follow-up      Acute infectious encephalopathy due to above:  Appeared to become more alert but has not really made progress beyond that now for a few days.  She continues to rarely speak and doesn't consistently follow commands.    -Continue to treat underlying issues as noted above.    -Maintain normal sleep/wake cycle as able.  -Avoid opioid pain medications as able.  -Concern about lack of improvement with mental status as noted above, re-image on Monday.     C. difficile colitis:  Patient was noted to have copious diarrhea on 8/1 and sample came back positive for C. Difficile.  -Continue oral vancomycin.  Will need a prolonged treatment given other abx use.  -Rectal tube still in place, liquid stool output still variable and at times fairly significant.  Will leave in place today.  -ID following as noted above.     Acute on chronic anemia:  Prior labs show baseline hemoglobin of about 9-10. Hemoglobin on admission was 11.7. Has slowly trended down, now low enough to recommend PRBC transfusion on 8/8/22. No obvious ongoing bleeding noted. WBC and platelets -  normal-elevated. Iron panel on 7/30/22 showed that her total iron was low at 14, binding capacity was low at 28, iron saturation was 6 and B12 was high and folate was normal.  -Repeat hemoglobin 8/8 lower at 6.4, then 6.9 on recheck. Transfused one unit PRBCs on 8/8/22.  -Hemoglobin stable/improved at 9.1 on 8/12/22.  She is iron deficient, received IV iron infusion x3 with first on 8/11/22.  -Continue to monitor, hgb stable above 8.      Sinus tachycardia:  -  Overall improved, still around 100 at times.  Treat underlying issues as noted.  Continue to monitor.     History of trigeminal neuralgia  -Continue PTA Trileptal and Topamax.     History of chronic pyloric ulcer  She had EGD done in 2020 which showed gastroduodenitis with a duodenal stricture.  -Continue PPI.     Hyperglycemia  Recent HbA1c was 5.6 on 7/19/22. Was not on any medication prior to admission.  -Blood sugars mildly elevated but overall fairly well controlled.  -Continue medium dose sliding scale insulin every 4 hours.    Recent Labs   Lab 08/17/22  0755 08/17/22  0451 08/16/22  2338 08/16/22  1950 08/16/22  1627 08/16/22  1226   * 122* 117* 112* 89 82     -Decrease Lantus to 10 units at bedtime 8/16     Intermittent Hypokalemia, Hyponatremia, Hypophosphatemia:  -Recheck and replace per protocol.  -Sodium down to 132 on 8/11/22, decreased free water flushes to 75 ml every 4 hours and now improved to 136 on 8/13/22.  -Continue to follow trend.     Severe protein calorie malnutrition:  -Dietitian consulted.  Currently getting tube feedings and free water flushes via NG feeding tube.  -Will need SLP consult when more able to participate.  If not making more neuro progress by Monday/Tuesday consider as part of goals of care discussions longer term FT placement.     Physical deconditioning:  -Patient seems severely deconditioned.  -PT/OT as able, not really able to substantially participate at this point due to her mental status.        Diet:  "Adult Formula Drip Feeding: Continuous Jevity 1.5; Nasogastric tube; Goal Rate: 50; mL/hr; Medication - Feeding Tube Flush Frequency: At least 15-30 mL water before and after medication administration and with tube clogging; Amount to Send (Nutrit...  NPO for Medical/Clinical Reasons Except for: Other; Specify: NPO For oral intake and gastric feedings for 6 hours post insertion Gastrostomy G/GJ tube. May feed through Jejunal or Distal PORT immediately for GJ tubes ONLY.    DVT Prophylaxis: Pneumatic Compression Devices  Abrams Catheter: Not present  Central Lines: PRESENT  PICC Triple Lumen 07/22/22 Right Basilic Vascular access-Site Assessment: WDL  Cardiac Monitoring: ACTIVE order. Indication: ICU  Code Status: Full Code      Disposition Plan     Expected Discharge Date: 08/19/2022        Discharge Comments: Needs NG feeding tube placed        The patient's care was discussed with the Bedside Nurse and Patient.    Timmy Grover MD, MD  Hospitalist Service  [facility]  Securely message with the Vocera Web Console (learn more here)  Text page via [employer] Paging/Directory     \"This dictation was performed with voice recognition software and may contain errors,  omissions and inadvertent word substitution.\"     Clinically Significant Risk Factors Present on Admission                    ______________________________________________________________________    Interval History   Tolerated G-tube placement yesterday.  Complains of persistent headache.  As per RN \"variant neuro exam, difficult to assess due to patient not following commands. Presents with L sided weakness sometimes and sometimes R sided weakness, has varied\"   Still refuses MRI  4 point ROS done and negative unless mentioned    Data reviewed today: I reviewed all medications, new labs and imaging results over the last 24 hours. I personally reviewed no images or EKG's today.    Physical Exam   /54 (BP Location: Left arm)   " Pulse 93   Temp 97.5  F (36.4  C) (Oral)   Resp 18   Wt 51.8 kg (114 lb 4.8 oz)   SpO2 95%   BMI 19.02 kg/m    Gen- pleasant  lying in bed  HEENT-  head incision C/d/i further as per neurosurgery  CVS- I+II+ no m/r/g  RS- CTAB  Abdo- soft, no tenderness. No g/r/r  Ext- no edema   CNS: She knows she is in the hospital.  Obeying instructions    Data    BMP  Recent Labs   Lab 08/17/22  0755 08/17/22  0451 08/17/22  0200 08/16/22  2338 08/16/22  1950 08/16/22  1226 08/16/22  1100 08/15/22  0749 08/15/22  0548 08/14/22  0808 08/14/22  0502 08/13/22  0759 08/13/22  0558 08/12/22  0818 08/12/22  0516   NA  --   --   --   --   --   --   --   --  137  --  140  --  136  --  137   POTASSIUM  --   --  3.5  --   --   --  3.2*  --  3.7  --  3.5  3.5  --  3.5  --  3.6   CHLORIDE  --   --   --   --   --   --   --   --  107  --  106  --  104  --  104   ADY  --   --   --   --   --   --   --   --  9.1  --  8.9  --  8.8  --  8.6   CO2  --   --   --   --   --   --   --   --  27  --  28  --  28  --  28   BUN  --   --   --   --   --   --   --   --  15  --  12  --  13  --  12   CR  --   --   --   --   --   --   --   --  0.38*  --  0.44*  --  0.44*  --  0.37*   * 122*  --  117* 112*   < >  --    < > 159*   < > 145*   < > 166*   < > 195*    < > = values in this interval not displayed.     CBC  Recent Labs   Lab 08/13/22  0558 08/12/22  0516 08/11/22  0714   WBC 8.4 9.5 10.0   RBC 3.92 4.14 4.17   HGB 8.5* 9.1* 9.1*   HCT 30.0* 31.0* 30.2*   MCV 77* 75* 72*   MCH 21.7* 22.0* 21.8*   MCHC 28.3* 29.4* 30.1*   RDW 21.1* 20.2* 20.2*   * 588* 540*     INRNo lab results found in last 7 days.  LFTs  No lab results found in last 7 days.   PANCNo lab results found in last 7 days.    Recent Results (from the past 24 hour(s))   IR Gastrostomy Tube Percutaneous Plcmnt    Narrative    GASTROSTOMY TUBE PLACEMENT 8/16/2022 3:27 PM    An NG tube was placed in the stomach.  The stomach was distended with  air through the tube.  Ultrasound  was used to aishwarya the inferior edge  of the lobe of the liver.  From a subxiphoid subhepatic approach,  lidocaine was administered on the skin of the anterior abdomen.  A  short incision was made at this point.  Two T fasteners were placed in  the body of the stomach in standard fashion.  A third puncture was  made into the body of the stomach and a wire directed into the fundus.   A  peel-away sheath was placed in the stomach and a gastrostomy  catheter was advanced over the wire into the stomach.  The balloon was  inflated  and pulled back against the anterior wall of the stomach.   The sheath was removed.  Contrast was injected to confirm the catheter  position in the stomach.  The catheter was then secured in place.  The  T fasteners were secured on the skin with interrupted stitches.  There  were no initial complications.  The tube can be used in 6 hours if the  patient's exam is stable.    Sedation: A moderate level of sedation was achieved with 0.5 mg of  midazolam                  25 mcg fentanyl    Sedation given by our nursing staff under my supervision.    Sedation time: 11 minutes    Fluoro time:  1.2 minutes  Air Kerma: 4.9 mGy  Local anesthetic: 19 mL of 1% lidocaine.  Contrast: 25 mL of Omnipaque 240 administered into the enteric tract  without complication.      Impression    IMPRESSION: Successful placement of 18 [place] YOLI gastrostomy tube and  two T-tacks. Per routine, tube may be used after 6 hours if vital  signs remain normal and no evidence of complication (peritoneal  signs).    TONG RUBALCAVA MD         SYSTEM ID:  [id number]

## 2022-08-18 ENCOUNTER — APPOINTMENT (OUTPATIENT)
Dept: GENERAL RADIOLOGY | Facility: CLINIC | Age: 77
DRG: 023 | End: 2022-08-18
Attending: NURSE PRACTITIONER
Payer: COMMERCIAL

## 2022-08-18 LAB
ANION GAP SERPL CALCULATED.3IONS-SCNC: 6 MMOL/L (ref 3–14)
APPEARANCE CSF: CLEAR
BUN SERPL-MCNC: 13 MG/DL (ref 7–30)
CALCIUM SERPL-MCNC: 7.9 MG/DL (ref 8.5–10.1)
CHLORIDE BLD-SCNC: 114 MMOL/L (ref 94–109)
CO2 SERPL-SCNC: 29 MMOL/L (ref 20–32)
COLOR CSF: COLORLESS
CREAT SERPL-MCNC: 0.43 MG/DL (ref 0.52–1.04)
CRP SERPL-MCNC: 17.3 MG/L (ref 0–8)
ERYTHROCYTE [DISTWIDTH] IN BLOOD BY AUTOMATED COUNT: 27.6 % (ref 10–15)
GFR SERPL CREATININE-BSD FRML MDRD: >90 ML/MIN/1.73M2
GLUCOSE BLD-MCNC: 134 MG/DL (ref 70–99)
GLUCOSE BLDC GLUCOMTR-MCNC: 109 MG/DL (ref 70–99)
GLUCOSE BLDC GLUCOMTR-MCNC: 118 MG/DL (ref 70–99)
GLUCOSE BLDC GLUCOMTR-MCNC: 131 MG/DL (ref 70–99)
GLUCOSE BLDC GLUCOMTR-MCNC: 139 MG/DL (ref 70–99)
GLUCOSE BLDC GLUCOMTR-MCNC: 145 MG/DL (ref 70–99)
GLUCOSE BLDC GLUCOMTR-MCNC: 159 MG/DL (ref 70–99)
GLUCOSE BLDC GLUCOMTR-MCNC: 185 MG/DL (ref 70–99)
GLUCOSE BLDC GLUCOMTR-MCNC: 200 MG/DL (ref 70–99)
GLUCOSE CSF-MCNC: 67 MG/DL (ref 40–70)
HCT VFR BLD AUTO: 34.6 % (ref 35–47)
HGB BLD-MCNC: 9.7 G/DL (ref 11.7–15.7)
LYMPH ABN NFR CSF MANUAL: 55 %
MCH RBC QN AUTO: 23.5 PG (ref 26.5–33)
MCHC RBC AUTO-ENTMCNC: 28 G/DL (ref 31.5–36.5)
MCV RBC AUTO: 84 FL (ref 78–100)
MONOS+MACROS NFR CSF MANUAL: 27 %
NEUTROPHILS NFR CSF MANUAL: 18 %
PHOSPHATE SERPL-MCNC: 3.5 MG/DL (ref 2.5–4.5)
PLATELET # BLD AUTO: 746 10E3/UL (ref 150–450)
POTASSIUM BLD-SCNC: 2.7 MMOL/L (ref 3.4–5.3)
POTASSIUM BLD-SCNC: 3.7 MMOL/L (ref 3.4–5.3)
PROT CSF-MCNC: 159 MG/DL (ref 15–60)
RBC # BLD AUTO: 4.13 10E6/UL (ref 3.8–5.2)
RBC # CSF MANUAL: 0 /UL (ref 0–2)
SODIUM SERPL-SCNC: 149 MMOL/L (ref 133–144)
TUBE # CSF: 4
WBC # BLD AUTO: 21.1 10E3/UL (ref 4–11)
WBC # CSF MANUAL: 56 /UL (ref 0–5)

## 2022-08-18 PROCEDURE — 87205 SMEAR GRAM STAIN: CPT | Performed by: NURSE PRACTITIONER

## 2022-08-18 PROCEDURE — 84100 ASSAY OF PHOSPHORUS: CPT | Performed by: INTERNAL MEDICINE

## 2022-08-18 PROCEDURE — 99233 SBSQ HOSP IP/OBS HIGH 50: CPT | Performed by: INTERNAL MEDICINE

## 2022-08-18 PROCEDURE — 84157 ASSAY OF PROTEIN OTHER: CPT | Performed by: NURSE PRACTITIONER

## 2022-08-18 PROCEDURE — 250N000013 HC RX MED GY IP 250 OP 250 PS 637: Performed by: HOSPITALIST

## 2022-08-18 PROCEDURE — 87075 CULTR BACTERIA EXCEPT BLOOD: CPT | Performed by: NURSE PRACTITIONER

## 2022-08-18 PROCEDURE — 87070 CULTURE OTHR SPECIMN AEROBIC: CPT | Performed by: NURSE PRACTITIONER

## 2022-08-18 PROCEDURE — 258N000003 HC RX IP 258 OP 636: Performed by: INTERNAL MEDICINE

## 2022-08-18 PROCEDURE — 250N000011 HC RX IP 250 OP 636: Performed by: HOSPITALIST

## 2022-08-18 PROCEDURE — 84132 ASSAY OF SERUM POTASSIUM: CPT | Performed by: INTERNAL MEDICINE

## 2022-08-18 PROCEDURE — 250N000013 HC RX MED GY IP 250 OP 250 PS 637: Performed by: INTERNAL MEDICINE

## 2022-08-18 PROCEDURE — 80048 BASIC METABOLIC PNL TOTAL CA: CPT | Performed by: INTERNAL MEDICINE

## 2022-08-18 PROCEDURE — 87483 CNS DNA AMP PROBE TYPE 12-25: CPT | Performed by: SPECIALIST

## 2022-08-18 PROCEDURE — 250N000011 HC RX IP 250 OP 636: Performed by: INTERNAL MEDICINE

## 2022-08-18 PROCEDURE — 258N000003 HC RX IP 258 OP 636: Performed by: HOSPITALIST

## 2022-08-18 PROCEDURE — 89051 BODY FLUID CELL COUNT: CPT | Performed by: NURSE PRACTITIONER

## 2022-08-18 PROCEDURE — 999N000190 HC STATISTIC VAT ROUNDS

## 2022-08-18 PROCEDURE — 85014 HEMATOCRIT: CPT | Performed by: INTERNAL MEDICINE

## 2022-08-18 PROCEDURE — 82945 GLUCOSE OTHER FLUID: CPT | Performed by: NURSE PRACTITIONER

## 2022-08-18 PROCEDURE — 86140 C-REACTIVE PROTEIN: CPT | Performed by: INTERNAL MEDICINE

## 2022-08-18 PROCEDURE — 009U3ZX DRAINAGE OF SPINAL CANAL, PERCUTANEOUS APPROACH, DIAGNOSTIC: ICD-10-PCS | Performed by: PHYSICIAN ASSISTANT

## 2022-08-18 PROCEDURE — 250N000009 HC RX 250: Performed by: INTERNAL MEDICINE

## 2022-08-18 PROCEDURE — 120N000001 HC R&B MED SURG/OB

## 2022-08-18 PROCEDURE — 62328 DX LMBR SPI PNXR W/FLUOR/CT: CPT

## 2022-08-18 RX ORDER — POTASSIUM CHLORIDE 20MEQ/15ML
20 LIQUID (ML) ORAL ONCE
Status: DISCONTINUED | OUTPATIENT
Start: 2022-08-18 | End: 2022-08-18

## 2022-08-18 RX ORDER — POTASSIUM CHLORIDE 1.5 G/1.58G
20 POWDER, FOR SOLUTION ORAL ONCE
Status: COMPLETED | OUTPATIENT
Start: 2022-08-18 | End: 2022-08-18

## 2022-08-18 RX ORDER — POTASSIUM CHLORIDE 1.5 G/1.58G
40 POWDER, FOR SOLUTION ORAL ONCE
Status: COMPLETED | OUTPATIENT
Start: 2022-08-18 | End: 2022-08-18

## 2022-08-18 RX ORDER — POTASSIUM CHLORIDE 20MEQ/15ML
40 LIQUID (ML) ORAL ONCE
Status: DISCONTINUED | OUTPATIENT
Start: 2022-08-18 | End: 2022-08-18

## 2022-08-18 RX ADMIN — INSULIN ASPART 1 UNITS: 100 INJECTION, SOLUTION INTRAVENOUS; SUBCUTANEOUS at 11:28

## 2022-08-18 RX ADMIN — Medication 15 ML: at 08:58

## 2022-08-18 RX ADMIN — VANCOMYCIN HYDROCHLORIDE 125 MG: KIT at 13:33

## 2022-08-18 RX ADMIN — INSULIN ASPART 1 UNITS: 100 INJECTION, SOLUTION INTRAVENOUS; SUBCUTANEOUS at 04:25

## 2022-08-18 RX ADMIN — LEVETIRACETAM 1000 MG: 10 INJECTION INTRAVENOUS at 22:17

## 2022-08-18 RX ADMIN — ACETAMINOPHEN 650 MG: 325 SUSPENSION ORAL at 19:59

## 2022-08-18 RX ADMIN — MICONAZOLE NITRATE: 20 CREAM TOPICAL at 10:18

## 2022-08-18 RX ADMIN — Medication 1 PACKET: at 08:55

## 2022-08-18 RX ADMIN — MEROPENEM 2 G: 1 INJECTION, POWDER, FOR SOLUTION INTRAVENOUS at 04:24

## 2022-08-18 RX ADMIN — Medication 40 MG: at 09:01

## 2022-08-18 RX ADMIN — OXYCODONE HYDROCHLORIDE 2.5 MG: 5 TABLET ORAL at 19:59

## 2022-08-18 RX ADMIN — LIDOCAINE HYDROCHLORIDE 3 ML: 10 INJECTION, SOLUTION EPIDURAL; INFILTRATION; INTRACAUDAL; PERINEURAL at 12:05

## 2022-08-18 RX ADMIN — OXCARBAZEPINE 450 MG: 300 SUSPENSION ORAL at 22:18

## 2022-08-18 RX ADMIN — Medication 1 PACKET: at 18:18

## 2022-08-18 RX ADMIN — POTASSIUM CHLORIDE 40 MEQ: 1.5 POWDER, FOR SOLUTION ORAL at 10:12

## 2022-08-18 RX ADMIN — VANCOMYCIN HYDROCHLORIDE 125 MG: KIT at 22:18

## 2022-08-18 RX ADMIN — Medication 1 PACKET: at 13:34

## 2022-08-18 RX ADMIN — MICONAZOLE NITRATE: 20 CREAM TOPICAL at 13:40

## 2022-08-18 RX ADMIN — POTASSIUM CHLORIDE 20 MEQ: 1.5 POWDER, FOR SOLUTION ORAL at 13:30

## 2022-08-18 RX ADMIN — INSULIN ASPART 2 UNITS: 100 INJECTION, SOLUTION INTRAVENOUS; SUBCUTANEOUS at 00:34

## 2022-08-18 RX ADMIN — VANCOMYCIN HYDROCHLORIDE 125 MG: KIT at 08:59

## 2022-08-18 RX ADMIN — INSULIN ASPART 1 UNITS: 100 INJECTION, SOLUTION INTRAVENOUS; SUBCUTANEOUS at 20:26

## 2022-08-18 RX ADMIN — MEROPENEM 2 G: 1 INJECTION, POWDER, FOR SOLUTION INTRAVENOUS at 13:25

## 2022-08-18 RX ADMIN — MICONAZOLE NITRATE: 20 CREAM TOPICAL at 20:18

## 2022-08-18 RX ADMIN — Medication 1 DROP: at 10:26

## 2022-08-18 RX ADMIN — TOPIRAMATE 50 MG: 50 TABLET ORAL at 22:17

## 2022-08-18 RX ADMIN — VANCOMYCIN HYDROCHLORIDE 1250 MG: 5 INJECTION, POWDER, LYOPHILIZED, FOR SOLUTION INTRAVENOUS at 10:32

## 2022-08-18 RX ADMIN — ACETAMINOPHEN 650 MG: 325 SUSPENSION ORAL at 15:35

## 2022-08-18 RX ADMIN — ACETAMINOPHEN 650 MG: 325 SUSPENSION ORAL at 02:33

## 2022-08-18 RX ADMIN — MEROPENEM 2 G: 1 INJECTION, POWDER, FOR SOLUTION INTRAVENOUS at 20:00

## 2022-08-18 RX ADMIN — LEVETIRACETAM 1000 MG: 10 INJECTION INTRAVENOUS at 10:08

## 2022-08-18 RX ADMIN — VANCOMYCIN HYDROCHLORIDE 125 MG: KIT at 18:12

## 2022-08-18 ASSESSMENT — ACTIVITIES OF DAILY LIVING (ADL)
ADLS_ACUITY_SCORE: 44
ADLS_ACUITY_SCORE: 48
ADLS_ACUITY_SCORE: 44
ADLS_ACUITY_SCORE: 42
ADLS_ACUITY_SCORE: 48
ADLS_ACUITY_SCORE: 44
ADLS_ACUITY_SCORE: 42
ADLS_ACUITY_SCORE: 48

## 2022-08-18 NOTE — PROGRESS NOTES
RN spoke with Dr. Grover regarding mild redness to skin from the neck to feet. No new orders but asked for RN to speak to pharmacy regarding possible decrease in rate. RN spoke with floor pharmacist who gave order to decrease remaining Vancomycin infusion to 120mL/hr for a total time of infusion to 2 hours instead of 1.5hr.

## 2022-08-18 NOTE — PROVIDER NOTIFICATION
"[facility] page to on-call Neurosurgery: \"CT results available from this evening. Please review and call back with any concerns. Thanks, Kirk SAMUELS\"  "

## 2022-08-18 NOTE — PLAN OF CARE
Goal Outcome Evaluation:    Plan of Care Reviewed With: patient     Overall Patient Progress: no change       Ox2. Lethargic. Inconsistent with following commands. Generalized weakness, LLE weaker than RLE. CT with contrast done--benadryl given prior. No rash noticed at this time. Known rash on perianal--WOC consult done-see recs. No brief in bed-encouraged turning pt on side. Rectal tube in place with minimal leaking. Purewick changed. R PICC. PEG. NPO. Ax2 Lift. Generalized pain--tylenol given.

## 2022-08-18 NOTE — PROVIDER NOTIFICATION
Spoke with on call neurosurg regarding CT- discussed pt's neuro status and they will review chart. No new orders at this time.  Orders placed for LP and MRI.

## 2022-08-18 NOTE — PROGRESS NOTES
[facility]    Medicine Progress Note - Hospitalist Service    Date of Admission:  7/27/2022    Assessment & Plan          Summary of Stay:  Julisa Chaney is a 77-year-old woman with medical history which includes trigeminal neuralgia who was recently admitted to [facility] on 7/19 for severe sepsis(had leukocytosis, lactic acidosis and elevated procalcitonin) due to COVID-19 infection and suspected bacterial pneumonia. Recent COVID-19 infection which was diagnosed on 7/19/2022 and was treated with 5 days of remdesivir and did not require any steroids and she was treated with IV vancomycin and IV meropenem.     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  She was switched to IV vancomycin, cefepime and metronidazole and transferred to [facility] for neurosurgery assessment. MRI brain w/wo contrast 7/30 showed ventriculitis with probable abscess. Neurosurgery/neurology/infectious disease/oncology consulted. Patient taken to the OR on 7/31 for left frontal ventriculostomy.   Since then has remained on broad spectrum antibiotics.  In addition she has developed C. Difficile colitis and is on treatment.  She has continued to have a significant encephalopathy.    Severe sepsis secondary to ventriculitis with abscess  Status left frontal ventriculostomy 7/31/22:  -Appreciate neurosurgery, oncology, infectious disease service assistance.  -Neurosurgery and oncology were consulted and flow cytometry was done which did not show any evidence of malignancy.  -Patient underwent left frontal ventriculostomy on 7/31/2022. Post-op management per neurosurgery team.  -CSF sent on 8/5 which revealed , , glucose 98 and protein 84.  WBC this AM 25, . CSF 2cc for repeat labs. CSF with sediment. EVD was clamped on 8/6 and removed on 8/8.  -Was on IV vancomycin, cefepime, and metronidazole. ID planning 6 weeks of treatment with current regimen - unable to de-escalate as cultures have been negative.  Daughter concerned about the cefepime. Patient apparently had similar symptoms of lethargy and confusion when on rocephin in February 2022 which improved after rocephin was discontinued. Unclear if rocephin was the etiology at that time, symptoms may have been due to her illness. Daughter would like to try a different antibiotic if possible. Following this ID stopped cefepime and metronidazole and started meropenem.  Also remained on Vancomycin.    8/16: s/p IR G tube placement  8/17:CT reviewed  8/18: Detailed discussion with the patient and the daughter.  Will get lumbar puncture today especially with the CT findings and worsening leukocytosis.  -  Continue meropenem, vancomycin IV.  Continue ID follow-up  -Mild skin redness.  May have to slow rate of vancomycin and monitor for any urticaria    Acute infectious encephalopathy due to above:  Appeared to become more alert but has not really made progress beyond that now for a few days.  She continues to rarely speak and doesn't consistently follow commands.    -Continue to treat underlying issues as noted above.    -Maintain normal sleep/wake cycle as able.  -Avoid opioid pain medications as able.    C. difficile colitis:  Patient was noted to have copious diarrhea on 8/1 and sample came back positive for C. Difficile.  -Continue oral vancomycin.  Will need a prolonged treatment given other abx use.  -Rectal tube still in place, liquid stool output still variable and at times fairly significant.  Will leave in place today.  -ID following as noted above.     Acute on chronic anemia:  Prior labs show baseline hemoglobin of about 9-10. Hemoglobin on admission was 11.7. Has slowly trended down, now  low enough to recommend PRBC transfusion on 8/8/22. No obvious ongoing bleeding noted. WBC and platelets - normal-elevated. Iron panel on 7/30/22 showed that her total iron was low at 14, binding capacity was low at 28, iron saturation was 6 and B12 was high and folate was normal.  -Repeat hemoglobin 8/8 lower at 6.4, then 6.9 on recheck. Transfused one unit PRBCs on 8/8/22.  -Hemoglobin stable/improved at 9.1 on 8/12/22.  She is iron deficient, received IV iron infusion x3 with first on 8/11/22.  -Continue to monitor, hgb stable above 8.      Sinus tachycardia:  -  Overall improved, still around 100 at times.  Treat underlying issues as noted.  Continue to monitor.     History of trigeminal neuralgia  -Continue PTA Trileptal and Topamax.     History of chronic pyloric ulcer  She had EGD done in 2020 which showed gastroduodenitis with a duodenal stricture.  -Continue PPI.     Hyperglycemia  Recent HbA1c was 5.6 on 7/19/22. Was not on any medication prior to admission.  -Blood sugars mildly elevated but overall fairly well controlled.  -Continue medium dose sliding scale insulin every 4 hours.    Recent Labs   Lab 08/18/22  1123 08/18/22  0819 08/18/22  0601 08/18/22  0423 08/18/22  0415 08/18/22  0012   * 109* 134* 145* 131* 200*     -Decreased Lantus to 10 units at bedtime 8/16     Intermittent Hypokalemia, Hyponatremia, Hypophosphatemia:  -Recheck and replace per protocol.  -Sodium down to 132 on 8/11/22, decreased free water flushes to 75 ml every 4 hours and now improved to 136 on 8/13/22.  -Continue to follow trend.     Hypernatremia 08/18: Increase free water flushes    Severe protein calorie malnutrition:  -Dietitian consulted.  Currently getting tube feedings and free water flushes via NG feeding tube.  -Will need SLP consult when more able to participate.  If not making more neuro progress by Monday/Tuesday consider as part of goals of care discussions longer term FT placement.     Physical  "deconditioning:  -Patient seems severely deconditioned.  -PT/OT as able, not really able to substantially participate at this point due to her mental status.        Diet: Adult Formula Drip Feeding: Continuous Jevity 1.5; Nasogastric tube; Goal Rate: 50; mL/hr; Medication - Feeding Tube Flush Frequency: At least 15-30 mL water before and after medication administration and with tube clogging; Amount to Send (Nutrit...  NPO for Medical/Clinical Reasons Except for: Other; Specify: NPO For oral intake and gastric feedings for 6 hours post insertion Gastrostomy G/GJ tube. May feed through Jejunal or Distal PORT immediately for GJ tubes ONLY.    DVT Prophylaxis: Pneumatic Compression Devices  Abrams Catheter: Not present  Central Lines: PRESENT  PICC Triple Lumen 07/22/22 Right Basilic Vascular access-Site Assessment: WDL  Cardiac Monitoring: None  Code Status: Full Code      Disposition Plan     Expected Discharge Date: 08/19/2022        Discharge Comments: Needs NG feeding tube placed        The patient's care was discussed with the Bedside Nurse and Patient.    Timmy Grover MD, MD  Hospitalist Service  [facility]  Securely message with the Vocera Web Console (learn more here)  Text page via [employer] Paging/Directory     \"This dictation was performed with voice recognition software and may contain errors,  omissions and inadvertent word substitution.\"     Clinically Significant Risk Factors Present on Admission                    ______________________________________________________________________    Interval History   Last 24-hour events noted.  Continues with elevated mental status and refusing MRI and LP.  Detailed discussion with patient and her daughter explaining the risk and benefit and she agreed.    4 point ROS done and negative unless mentioned    Data reviewed today: I reviewed all medications, new labs and imaging results over the last 24 hours. I personally reviewed no images or " EKG's today.    Physical Exam   /56 (BP Location: Left arm)   Pulse 106   Temp 98.5  F (36.9  C) (Oral)   Resp 18   Wt 51.8 kg (114 lb 4.8 oz)   SpO2 99%   BMI 19.02 kg/m    HEENT-  head incision C/d/i further as per neurosurgery  CVS- I+II+ no m/r/g  RS- CTAB  Abdo- soft, no tenderness. No g/r/r  Ext- no edema   CNS: She knows she is in the hospital.  Obeying instructions  Does not follow any of the president or year  Data    BMP  Recent Labs   Lab 08/18/22  1123 08/18/22  0819 08/18/22  0601 08/18/22  0423 08/18/22  0012 08/17/22  2021 08/17/22  0409 08/17/22  0200 08/16/22  1226 08/16/22  1100 08/15/22  0749 08/15/22  0548 08/14/22  0808 08/14/22  0502 08/13/22  0759 08/13/22  0558   NA  --   --  149*  --   --   --   --   --   --   --   --  137  --  140  --  136   POTASSIUM  --   --  2.7*  --   --   --   --  3.5  --  3.2*  --  3.7  --  3.5  3.5  --  3.5   CHLORIDE  --   --  114*  --   --   --   --   --   --   --   --  107  --  106  --  104   ADY  --   --  7.9*  --   --   --   --   --   --   --   --  9.1  --  8.9  --  8.8   CO2  --   --  29  --   --   --   --   --   --   --   --  27  --  28  --  28   BUN  --   --  13  --   --   --   --   --   --   --   --  15  --  12  --  13   CR  --   --  0.43*  --   --  0.46*  --   --   --   --   --  0.38*  --  0.44*  --  0.44*   * 109* 134* 145*   < >  --    < >  --    < >  --    < > 159*   < > 145*   < > 166*    < > = values in this interval not displayed.     CBC  Recent Labs   Lab 08/18/22  0601 08/13/22  0558 08/12/22  0516   WBC 21.1* 8.4 9.5   RBC 4.13 3.92 4.14   HGB 9.7* 8.5* 9.1*   HCT 34.6* 30.0* 31.0*   MCV 84 77* 75*   MCH 23.5* 21.7* 22.0*   MCHC 28.0* 28.3* 29.4*   RDW 27.6* 21.1* 20.2*   * 560* 588*     INRNo lab results found in last 7 days.  LFTs  No lab results found in last 7 days.   PANCNo lab results found in last 7 days.    Recent Results (from the past 24 hour(s))   CT Head w/o & w Contrast    Narrative    EXAM: CT HEAD W/O and  W CONTRAST  LOCATION: [facility]  DATE/TIME: 8/17/2022 5:33 PM    INDICATION: Follow up on ventriculitis and brain abscess. Patient refused MRI  COMPARISON: MRI brain 07/27/2022, CT head 8/7/2022 and 8/8/2022.  CONTRAST: 75mL Isovue 370  TECHNIQUE: Routine CT Head without and with IV contrast. Dose reduction techniques were used.    FINDINGS:  INTRACRANIAL CONTENTS: Similar appearance of left frontal edema and enhancing subependymal abscess, similar in appearance compared to the 07/27/2022 MRI. Interval removal of the left frontal approach EVD with interval development of mild hydrocephalus,   particularly involving the left lateral ventricle which has mild to moderately increased in size. Mild subependymal enhancement of the ventricles, compatible with known ventriculitis. Trace pneumocephalus. Minimal rightward midline shift, unchanged. No   new hemorrhage or large territorial infarct. Redemonstration of remote bilateral cerebellar infarcts. Cerebellar tonsils are above the foramen magnum.     VISUALIZED ORBITS/SINUSES/MASTOIDS: No intraorbital abnormality. No significant paranasal sinus mucosal disease. No middle ear or mastoid effusion.    BONES/SOFT TISSUES: No acute abnormality. Left frontal kelsey hole.      Impression    IMPRESSION:  1.  Interval removal of the left frontal approach EVD with new hydrocephalus since 08/08/2022.  2.  Allowing for differences in technique, the left frontal parenchymal abscess with ventriculitis and cerebritis are otherwise not significantly changed in appearance.    Dr. Moralez was contacted by me on 8/17/2022 7:27 PM and verbalized understanding of the critical result.

## 2022-08-18 NOTE — PROGRESS NOTES
Contacted by nursing regarding head CT results. Per RN, patient remains lethargic and following commands, moves all extremities. Neuros remain unchanged.    Head CT  IMPRESSION:  1.  Interval removal of the left frontal approach EVD with new hydrocephalus since 08/08/2022.  2.  Allowing for differences in technique, the left frontal parenchymal abscess with ventriculitis and cerebritis are otherwise not significantly changed in appearance.    Discussed with Dr. Biggs    Recommendations:  -Lumbar puncture with opening pressure as well as repeat CSF cultures  -Repeat brain MRI with and without contrast  -Final recommendations pending further workup    Hafsa Layton, [facility]  [address] [place] [address]  [address] [place] [id number]  Tel [phone]  Fax [phone]

## 2022-08-18 NOTE — PROGRESS NOTES
RADIOLOGY PROCEDURE NOTE  Patient name: Julisa Chaney  MRN: [id number]  : 1945    Pre-procedure diagnosis: Altered mental status and lethargic  Post-procedure diagnosis: Same    Procedure Date/Time: 2022  12:30 PM  Procedure: LP at L2-L3  Estimated blood loss: None  Specimen(s) collected with description: 8.5 ml of clear CSF  The patient tolerated the procedure well with no immediate complications.  Significant findings:Opening pressure about 12 cm of H20.    See imaging dictation for procedural details.    Provider name: Miguel Wolfe PA-C  Assistant(s):None

## 2022-08-18 NOTE — PHARMACY-VANCOMYCIN DOSING SERVICE
Pharmacy Vancomycin Note  Date of Service 2022  Patient's  1945   77 year old, female    Indication: Abscess and Meningitis  Day of Therapy: 28  Current vancomycin regimen:  750 mg IV q12h  Current vancomycin monitoring method: Trough (Method 1 = dosing nomogram)  Current vancomycin therapeutic monitoring goal: 15-20 mg/L      Current estimated CrCl = Estimated Creatinine Clearance: 83.8 mL/min (A) (based on SCr of 0.46 mg/dL (L)).    Creatinine for last 3 days  8/15/2022:  5:48 AM Creatinine 0.38 mg/dL  2022:  8:21 PM Creatinine 0.46 mg/dL    Recent Vancomycin Levels (past 3 days)  8/15/2022:  8:36 PM Vancomycin 24.7 mg/L  2022:  8:21 PM Vancomycin 24.9 mg/L    Vancomycin IV Administrations (past 72 hours)                   vancomycin (VANCOCIN) 750 mg in sodium chloride 0.9 % 250 mL intermittent infusion (mg) 750 mg New Bag 22 1049     750 mg New Bag 22 2203     750 mg New Bag  0952     750 mg New Bag 08/15/22 2310    vancomycin (VANCOCIN) 1000 mg in dextrose 5% 200 mL PREMIX (mg) 1,000 mg New Bag 08/15/22 1110     1,000 mg New Bag 22 2208                Nephrotoxins and other renal medications (From now, onward)    Start     Dose/Rate Route Frequency Ordered Stop    22 1000  vancomycin 1250 mg in 0.9% NaCl 250 mL intermittent infusion 1,250 mg         1,250 mg  over 90 Minutes Intravenous EVERY 24 HOURS 22 2148      08/15/22 0000  vancomycin        Note to Pharmacy: Check twice weekly creatinine and vancomycin levels. Dosing per Pharm D based on AUC       08/15/22 1505 22 2359    22 0900  vancomycin (FIRVANQ) oral solution 125 mg         125 mg Oral or Feeding Tube 4 TIMES DAILY 22 0819               Contrast Orders - past 72 hours (72h ago, onward)    Start     Dose/Rate Route Frequency Stop    22 1200  iopamidol (ISOVUE-370) solution 75 mL         75 mL Intravenous ONCE 22 1734    22 1530  iohexol (OMNIPAQUE) 240  mg/mL solution 50 mL         50 mL Per G Tube ONCE 08/16/22 1528          Interpretation of levels and current regimen:  Vancomycin level is reflective of supratherapeutic level    Has serum creatinine changed greater than 50% in last 72 hours: No      Renal Function: Stable          Plan:  1. Decrease Dose to 1250mg IV q 24 hours   2. Vancomycin monitoring method: Trough (Method 1 = dosing nomogram)  3. Vancomycin therapeutic monitoring goal: 15-20 mg/L  4. Pharmacy will check vancomycin levels as appropriate in 1-3 Days.  5. Serum creatinine levels will be ordered a minimum of twice weekly.    Elsie Short, [facility]

## 2022-08-18 NOTE — PLAN OF CARE
"Goal Outcome Evaluation:  Reason for Admission: Brain abscess & severe sepsis d/t ventriculitis     Cognitive/Mentation: A/Ox 2, disoriented to time and situation  Neuros/CMS: lethargic, slow soft speech, L sided weakness. Inconsistently follows commands.   VS: stable on RA.   Tele: NSR.  GI: BS active, rectal tube in place with watery/loose stool. Bag changed this shift.   : Voiding adequately. Incontinent, purewick in place.  Pulmonary: LS dim. Congested, nonproductive cough  Pain: c/o \"pain all over\" PRN tylenol admin Q4h    Drains/Lines: RUE PICC intact & SL. PEG tube intact-   Skin: Red/excoriated buttocks-MARLYS cream applied with incontinent care. Pt developed reddened rash after CT w contrast. Surgical incision CDI & SHERRI.   Activity: Assist x 2 with lift.  Diet: NPO. Meds per PEG. TF infusing @ goal rate of 50/hr.     Therapies recs: pending  Discharge: pending medical stability    Aggression Stoplight Tool: green    End of shift summary: See previous notes regarding CT scan changes. Patient adamantly refusing MRI at this time.                     "

## 2022-08-18 NOTE — PROVIDER NOTIFICATION
MD Notification    Notified Person: MD    Notified Person Name: Dr. Grover    Notification Date/Time: 8/18/22 at 1627    Notification Interaction: [employer] messaging    Purpose of Notification: request for psych consult to assess if patient is decisional    Orders Received: Tabatha hernandez note for tomorrow    Comments: Patient continues to refuse MRI despite education about condition and need

## 2022-08-18 NOTE — PROVIDER NOTIFICATION
"MD Notification    Notified Person: MD    Notified Person Name: Dr. Grover    Notification Date/Time: 8/18/22 at 0919    Notification Interaction: [employer] message    Purpose of Notification: \"Patient is still refusing MRI and LP this AM. XR was hoping to do LP at 1130. Just paged neurosurg to attempt to talk with patient, but wondering if she continues to refuse if we should think alt route such as palliative?\"    Repaged at 1001: \"Please call daughter Alissa at [phone] to confirm that we should go by patients refusal/discuss POC. LP planned for 1130 today and pt refusing. Thanks!\"    "

## 2022-08-18 NOTE — PROGRESS NOTES
[facility]    Neurosurgery  Daily Post-Op Note    Assessment & Plan   Procedure(s):  LEFT FRONTAL VENTRICULOSTOMY   18 Days Post-Op  Patient is lethargic but does alert to voice and is answering questions.  She states she does not want to have any additional tests done.  I discussed with her that these could potentially provide us with information that would allow us to improve her condition.  She states that she understands that but does not want to proceed with any additional testing.    Plan:  -We do continue to recommend the lumbar puncture with opening pressure and CSF cultures, as well as repeat brain MRI with and without contrast.      Chucho Balderas PA-C    Interval History   Stable.      Physical Exam   Temp: 98.5  F (36.9  C) Temp src: Oral BP: 113/56 Pulse: 106   Resp: 18 SpO2: 99 % O2 Device: None (Room air)    Vitals:    08/13/22 0600 08/14/22 0500 08/15/22 0400   Weight: 59.6 kg (131 lb 6.3 oz) 55.7 kg (122 lb 12.7 oz) 51.8 kg (114 lb 4.8 oz)     Vital Signs with Ranges  Temp:  [97.5  F (36.4  C)-98.7  F (37.1  C)] 98.5  F (36.9  C)  Pulse:  [] 106  Resp:  [16-22] 18  BP: (113-141)/(56-83) 113/56  SpO2:  [94 %-99 %] 99 %  I/O last 3 completed shifts:  In: 756 [NG/GT:756]  Out: 1950 [Urine:950; Stool:1000]    Alert and following simple commands.  Moves all extremities equally.    Incision:       Medications     - MEDICATION INSTRUCTIONS -          banatrol plus  1 packet Per Feeding Tube TID w/meals     insulin aspart  1-6 Units Subcutaneous Q4H     insulin glargine  10 Units Subcutaneous At Bedtime     levETIRAcetam  1,000 mg Intravenous Q12H     meropenem  2 g Intravenous Q8H     miconazole with skin protectant   Topical BID     multivitamins w/minerals  15 mL Per Feeding Tube Daily     OXcarbazepine  450 mg Oral or Feeding Tube At Bedtime     pantoprazole  40 mg Oral or Feeding Tube QAM AC     potassium chloride  20 mEq Oral or Feeding Tube Once     sodium chloride  (PF)  10-40 mL Intracatheter Q7 Days     topiramate  50 mg Oral or Feeding Tube At Bedtime     vancomycin  125 mg Oral or Feeding Tube 4x Daily     vancomycin  1,250 mg Intravenous Q24H           Chucho Balderas PA-C  [facility]  [address]  [address]  [place] [address]    Tel [phone]  Pager [phone]

## 2022-08-18 NOTE — PROVIDER NOTIFICATION
"MD Notification    Notified Person: PA    Notified Person Name: Bill Balderas    Notification Date/Time: 8/18/22 at 0825    Notification Interaction: amcom    Purpose of Notification: \"[facility] - 719 - GC - Do you feel patient is appropriate for neuros/vitals q8h vs. current q4h orders? Still refusing MRI and LP. Thanks! Isabelle SAMUELS *[phone]\"    Orders Received: Neuros changed to q8h    8/18/22 at 0916 page sent: \"[id number] - GC - still refusing LP. XR was hoping for 1130. Any chance you have time to try and talk with patient to see if she is willing to change mind? I wasnt able to get through to her Isabelle RN *[phone]\"    "

## 2022-08-19 ENCOUNTER — APPOINTMENT (OUTPATIENT)
Dept: OCCUPATIONAL THERAPY | Facility: CLINIC | Age: 77
DRG: 023 | End: 2022-08-19
Attending: INTERNAL MEDICINE
Payer: COMMERCIAL

## 2022-08-19 ENCOUNTER — APPOINTMENT (OUTPATIENT)
Dept: CT IMAGING | Facility: CLINIC | Age: 77
DRG: 023 | End: 2022-08-19
Attending: PHYSICIAN ASSISTANT
Payer: COMMERCIAL

## 2022-08-19 LAB
ANION GAP SERPL CALCULATED.3IONS-SCNC: 3 MMOL/L (ref 3–14)
ATRIAL RATE - MUSE: 114 BPM
BUN SERPL-MCNC: 16 MG/DL (ref 7–30)
C GATTII+NEOFOR DNA CSF QL NAA+NON-PROBE: NEGATIVE
CALCIUM SERPL-MCNC: 8.5 MG/DL (ref 8.5–10.1)
CHLORIDE BLD-SCNC: 119 MMOL/L (ref 94–109)
CMV DNA CSF QL NAA+NON-PROBE: NEGATIVE
CO2 SERPL-SCNC: 27 MMOL/L (ref 20–32)
CREAT SERPL-MCNC: 0.42 MG/DL (ref 0.52–1.04)
DIASTOLIC BLOOD PRESSURE - MUSE: NORMAL MMHG
E COLI K1 AG CSF QL: NEGATIVE
ERYTHROCYTE [DISTWIDTH] IN BLOOD BY AUTOMATED COUNT: 28.3 % (ref 10–15)
EV RNA SPEC QL NAA+PROBE: NEGATIVE
GFR SERPL CREATININE-BSD FRML MDRD: >90 ML/MIN/1.73M2
GLUCOSE BLD-MCNC: 173 MG/DL (ref 70–99)
GLUCOSE BLDC GLUCOMTR-MCNC: 103 MG/DL (ref 70–99)
GLUCOSE BLDC GLUCOMTR-MCNC: 105 MG/DL (ref 70–99)
GLUCOSE BLDC GLUCOMTR-MCNC: 119 MG/DL (ref 70–99)
GLUCOSE BLDC GLUCOMTR-MCNC: 140 MG/DL (ref 70–99)
GLUCOSE BLDC GLUCOMTR-MCNC: 146 MG/DL (ref 70–99)
GLUCOSE BLDC GLUCOMTR-MCNC: 94 MG/DL (ref 70–99)
GP B STREP DNA CSF QL NAA+NON-PROBE: NEGATIVE
HAEM INFLU DNA CSF QL NAA+NON-PROBE: NEGATIVE
HCT VFR BLD AUTO: 33.3 % (ref 35–47)
HGB BLD-MCNC: 9.2 G/DL (ref 11.7–15.7)
HHV6 DNA CSF QL NAA+NON-PROBE: NEGATIVE
HSV1 DNA CSF QL NAA+NON-PROBE: NEGATIVE
HSV2 DNA CSF QL NAA+NON-PROBE: NEGATIVE
INTERPRETATION ECG - MUSE: NORMAL
L MONOCYTOG DNA CSF QL NAA+NON-PROBE: NEGATIVE
MCH RBC QN AUTO: 23.7 PG (ref 26.5–33)
MCHC RBC AUTO-ENTMCNC: 27.6 G/DL (ref 31.5–36.5)
MCV RBC AUTO: 86 FL (ref 78–100)
N MEN DNA CSF QL NAA+NON-PROBE: NEGATIVE
P AXIS - MUSE: 74 DEGREES
PARECHOVIRUS A RNA CSF QL NAA+NON-PROBE: NEGATIVE
PLATELET # BLD AUTO: 663 10E3/UL (ref 150–450)
POTASSIUM BLD-SCNC: 3.7 MMOL/L (ref 3.4–5.3)
PR INTERVAL - MUSE: 138 MS
QRS DURATION - MUSE: 84 MS
QT - MUSE: 324 MS
QTC - MUSE: 446 MS
R AXIS - MUSE: 75 DEGREES
RBC # BLD AUTO: 3.89 10E6/UL (ref 3.8–5.2)
S PNEUM DNA CSF QL NAA+NON-PROBE: NEGATIVE
SODIUM SERPL-SCNC: 149 MMOL/L (ref 133–144)
SYSTOLIC BLOOD PRESSURE - MUSE: NORMAL MMHG
T AXIS - MUSE: 71 DEGREES
VANCOMYCIN SERPL-MCNC: 15.3 MG/L
VENTRICULAR RATE- MUSE: 114 BPM
VZV DNA CSF QL NAA+NON-PROBE: NEGATIVE
WBC # BLD AUTO: 11.6 10E3/UL (ref 4–11)

## 2022-08-19 PROCEDURE — 99233 SBSQ HOSP IP/OBS HIGH 50: CPT | Performed by: SPECIALIST

## 2022-08-19 PROCEDURE — 85027 COMPLETE CBC AUTOMATED: CPT | Performed by: INTERNAL MEDICINE

## 2022-08-19 PROCEDURE — 258N000003 HC RX IP 258 OP 636: Performed by: INTERNAL MEDICINE

## 2022-08-19 PROCEDURE — 99233 SBSQ HOSP IP/OBS HIGH 50: CPT | Performed by: INTERNAL MEDICINE

## 2022-08-19 PROCEDURE — 250N000011 HC RX IP 250 OP 636: Performed by: HOSPITALIST

## 2022-08-19 PROCEDURE — 999N000040 HC STATISTIC CONSULT NO CHARGE VASC ACCESS

## 2022-08-19 PROCEDURE — 120N000001 HC R&B MED SURG/OB

## 2022-08-19 PROCEDURE — 250N000013 HC RX MED GY IP 250 OP 250 PS 637: Performed by: HOSPITALIST

## 2022-08-19 PROCEDURE — 999N000190 HC STATISTIC VAT ROUNDS

## 2022-08-19 PROCEDURE — 80048 BASIC METABOLIC PNL TOTAL CA: CPT | Performed by: INTERNAL MEDICINE

## 2022-08-19 PROCEDURE — 250N000013 HC RX MED GY IP 250 OP 250 PS 637: Performed by: INTERNAL MEDICINE

## 2022-08-19 PROCEDURE — 80202 ASSAY OF VANCOMYCIN: CPT | Performed by: INTERNAL MEDICINE

## 2022-08-19 PROCEDURE — 87389 HIV-1 AG W/HIV-1&-2 AB AG IA: CPT | Performed by: SPECIALIST

## 2022-08-19 PROCEDURE — 258N000003 HC RX IP 258 OP 636: Performed by: HOSPITALIST

## 2022-08-19 PROCEDURE — 97530 THERAPEUTIC ACTIVITIES: CPT | Mod: GO | Performed by: OCCUPATIONAL THERAPIST

## 2022-08-19 PROCEDURE — 99024 POSTOP FOLLOW-UP VISIT: CPT | Performed by: PHYSICIAN ASSISTANT

## 2022-08-19 PROCEDURE — 250N000011 HC RX IP 250 OP 636: Performed by: INTERNAL MEDICINE

## 2022-08-19 PROCEDURE — 70450 CT HEAD/BRAIN W/O DYE: CPT

## 2022-08-19 RX ADMIN — Medication 40 MG: at 09:42

## 2022-08-19 RX ADMIN — LEVETIRACETAM 1000 MG: 10 INJECTION INTRAVENOUS at 22:18

## 2022-08-19 RX ADMIN — ACETAMINOPHEN 650 MG: 325 SUSPENSION ORAL at 18:26

## 2022-08-19 RX ADMIN — OXCARBAZEPINE 450 MG: 300 SUSPENSION ORAL at 22:20

## 2022-08-19 RX ADMIN — VANCOMYCIN HYDROCHLORIDE 1250 MG: 5 INJECTION, POWDER, LYOPHILIZED, FOR SOLUTION INTRAVENOUS at 09:42

## 2022-08-19 RX ADMIN — OXYCODONE HYDROCHLORIDE 2.5 MG: 5 TABLET ORAL at 03:39

## 2022-08-19 RX ADMIN — MICONAZOLE NITRATE: 20 CREAM TOPICAL at 09:42

## 2022-08-19 RX ADMIN — VANCOMYCIN HYDROCHLORIDE 125 MG: KIT at 22:20

## 2022-08-19 RX ADMIN — LEVETIRACETAM 1000 MG: 10 INJECTION INTRAVENOUS at 09:43

## 2022-08-19 RX ADMIN — ACETAMINOPHEN 650 MG: 325 SUSPENSION ORAL at 09:41

## 2022-08-19 RX ADMIN — VANCOMYCIN HYDROCHLORIDE 125 MG: KIT at 13:27

## 2022-08-19 RX ADMIN — TOPIRAMATE 50 MG: 50 TABLET ORAL at 22:20

## 2022-08-19 RX ADMIN — VANCOMYCIN HYDROCHLORIDE 125 MG: KIT at 09:42

## 2022-08-19 RX ADMIN — OXYCODONE HYDROCHLORIDE 5 MG: 5 TABLET ORAL at 22:19

## 2022-08-19 RX ADMIN — INSULIN ASPART 1 UNITS: 100 INJECTION, SOLUTION INTRAVENOUS; SUBCUTANEOUS at 20:58

## 2022-08-19 RX ADMIN — Medication 1 PACKET: at 18:22

## 2022-08-19 RX ADMIN — ACETAMINOPHEN 650 MG: 325 SUSPENSION ORAL at 22:19

## 2022-08-19 RX ADMIN — ACETAMINOPHEN 650 MG: 325 SUSPENSION ORAL at 03:39

## 2022-08-19 RX ADMIN — MICONAZOLE NITRATE: 20 CREAM TOPICAL at 20:52

## 2022-08-19 RX ADMIN — OXYCODONE HYDROCHLORIDE 5 MG: 5 TABLET ORAL at 18:26

## 2022-08-19 RX ADMIN — OXYCODONE HYDROCHLORIDE 2.5 MG: 5 TABLET ORAL at 13:27

## 2022-08-19 RX ADMIN — MEROPENEM 2 G: 1 INJECTION, POWDER, FOR SOLUTION INTRAVENOUS at 20:52

## 2022-08-19 RX ADMIN — Medication 1 PACKET: at 13:26

## 2022-08-19 RX ADMIN — Medication 15 ML: at 09:42

## 2022-08-19 RX ADMIN — VANCOMYCIN HYDROCHLORIDE 125 MG: KIT at 18:22

## 2022-08-19 RX ADMIN — MEROPENEM 2 G: 1 INJECTION, POWDER, FOR SOLUTION INTRAVENOUS at 03:39

## 2022-08-19 RX ADMIN — MEROPENEM 2 G: 1 INJECTION, POWDER, FOR SOLUTION INTRAVENOUS at 13:27

## 2022-08-19 RX ADMIN — INSULIN ASPART 1 UNITS: 100 INJECTION, SOLUTION INTRAVENOUS; SUBCUTANEOUS at 03:46

## 2022-08-19 ASSESSMENT — ACTIVITIES OF DAILY LIVING (ADL)
ADLS_ACUITY_SCORE: 42
ADLS_ACUITY_SCORE: 39
ADLS_ACUITY_SCORE: 42
ADLS_ACUITY_SCORE: 38
ADLS_ACUITY_SCORE: 42
ADLS_ACUITY_SCORE: 39

## 2022-08-19 NOTE — PROGRESS NOTES
Neurosurgery progress note    Postoperative day 19 status post left frontal ventriculostomy placement  11 days status post ventriculostomy removal    Patient reports 10 out of 10 headache today.  She underwent lumbar puncture yesterday with opening pressure of 12.  Headache does not appear to be positional.  She has declined brain MRI, but acquiesced to having a head CT scan today to evaluate her increased headaches.  Head CT scan was stable today, and CT scan on the August 17th had mentioned increase in hydrocephalus..    Exam    Alert, answers questions, follows commands  Moves bilateral upper extremities, equal strength in bilateral upper extremities  Wiggles both of her feet, does not/unable to lift up her legs when asked to do so  Pupils equal and reactive to light, extraocular movements intact    Imaging    Head CT scan today 8/19/2022 and compared to scan performed 8/17/2022  IMPRESSION:   1. Stable presumed vasogenic edema in the white matter of the anterior  inferior left frontal lobe related to ventriculitis of the left  lateral ventricle.  2. Stable ventriculomegaly of the left lateral ventricle. Interval  decrease in volume of gas in the left lateral ventricle since the  recent comparison study.  3. Diffuse cerebral volume loss and cerebral white matter changes  consistent with chronic small vessel ischemic disease.    Labs     CSF collected yesterday with no growth yet, no organisms on gram stain    Plan    Head CT scan stable, continue to monitor headache and neurologic exam, no intervention needed at this time.    Brain MRI at this time likely will not add a great deal to the treatment and management, continue to monitor CSF for organisms.    Discussed with Dr. Alatorre

## 2022-08-19 NOTE — PROVIDER NOTIFICATION
"Text page sent to hospitalist: \"FYI Pt complaining of 10/10 headache. Had LP yesterday. headache did not improve when HOB flat for cares. Neurosurgery NP on the floor - notified and will see pt. Will also administer PRN Tylenol.\"  "

## 2022-08-19 NOTE — PLAN OF CARE
"Reason for Admission: Hydrocephalus, sepsis, ventriculitis    Cognitive/Mentation: A/Ox 1-2. Dis to time and situation  Neuros/CMS: withdrawn, soft/hesitant speech, intermittent command following, gen weakness  VS: stable on RA.   Tele: NSR.  GI: BS normoactive, + flatus. Rectal tube.  : purewick.   Pulmonary: LS diminished.  Pain: Pain around PEG site and \"everywhere\" per pt. Oxycodone and tylenol q4h.     Lines: R triple lumen PICC  Skin: Pink in color from neck down - MD aware.  Activity: Assist x 2 with lift.  Diet: NPO - TF running at goal 50 ml/hr, 75 ml FWF q4h.     Therapies recs: TCU  Discharge: pending medical clearance    End of shift summary: Contact and enteric precautions maintained. Pt continues to moan in pain d/t PEG tube site -split gauze applied to skin, oxy/tylenol controlled pain. BG checks Q4H.         "

## 2022-08-19 NOTE — PLAN OF CARE
"Pt here with hydrocephalus, ventriculitis with C.diff and MRSA. Contact and enteric precautions maintained throughout the shift. Patient intermittently lethargic throughout, only oriented to self, and sometimes place. Intermittently following commands with soft, slow speech. 3/5 strength in BUE and 1-2/5 strength in BLE. Unable to assess numbness/tingling/complex commands due to not answering appropriately or following these commands. Prefers to keep eyes shut during assessments and difficult to assess full neuro. Crusty, dried drainage around eyes, primarily the right. Both eyes cleaned with water and PRN eye drops applied. Oral cares performed with each repositioning approx. Q2h. Intermittently tachy, otherwise VSS. No witnessed seizures noted. Seizure precautions maintained. Rectal tube patent with 100mL out this shift. Slight leaking around rectum. Cleaned with each reposition per order. PEG tube patent with TF running at GR of 50mL/hr. Water flushes completed q4h. Pt reporting pain from PEG site. Decreased at times with repositioning and PRN Tylenol. Difficult to communicate needs. Describes location of pain as \"all over\" and answers \"yes\" when asked about different parts of the body being in pain; moaning occasionally. Incontinent of urine; purewick in place. PICC to RUE not drawing blood. New cap applied to red port with success. K+ low this AM, replaced with recheck 3.7. Diminished LS throughout. Frequent, nonproductive cough noted.     Although resistive, patient spoke with daughter, Alissa and then agreed to complete LP today. Results finalized. Band-aid to backside C/D/I. Continues to refuse MRI. Discharge plan pending status. Plan for lab draws in the morning and to continue to attempt MRI throughout shifts.  "

## 2022-08-19 NOTE — PLAN OF CARE
Goal Outcome Evaluation:          Overall Patient Progress: no change    Outcome Evaluation: Pt tolerating TF at goal: Jevity 1.5 @ 50 mL/hr continuous.  Banatrol - 1 packet TID for cdiff diarrhea.  Plan to continue with current EN regimen.

## 2022-08-19 NOTE — PROGRESS NOTES
[facility]    Medicine Progress Note - Hospitalist Service    Date of Admission:  7/27/2022    Assessment & Plan          Summary of Stay:  Julisa Chaney is a 77-year-old woman with medical history which includes trigeminal neuralgia who was recently admitted to [facility] [place] on 7/19 for severe sepsis(had leukocytosis, lactic acidosis and elevated procalcitonin) due to COVID-19 infection and suspected bacterial pneumonia. Recent COVID-19 infection which was diagnosed on 7/19/2022 and was treated with 5 days of remdesivir and did not require any steroids and she was treated with IV vancomycin and IV meropenem.     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  She was switched to IV vancomycin, cefepime and metronidazole and transferred to [facility] for neurosurgery assessment. MRI brain w/wo contrast 7/30 showed ventriculitis with probable abscess. Neurosurgery/neurology/infectious disease/oncology consulted. Patient taken to the OR on 7/31 for left frontal ventriculostomy.   Since then has remained on broad spectrum antibiotics.  In addition she has developed C. Difficile colitis and is on treatment.  She has continued to have a significant encephalopathy.    Severe sepsis secondary to ventriculitis with abscess  Status left frontal ventriculostomy 7/31/22:  -Appreciate neurosurgery, oncology, infectious disease service assistance.  -Neurosurgery and oncology were consulted and flow cytometry was done which did not show any evidence of malignancy.  -Patient underwent left frontal ventriculostomy on 7/31/2022. Post-op management per neurosurgery team.  -CSF sent on 8/5 which revealed , , glucose 98 and protein 84.  WBC this AM 25, . CSF 2cc for repeat labs. CSF with sediment. EVD was clamped on 8/6 and removed on 8/8.  -Was on IV vancomycin, cefepime, and metronidazole. ID planning 6 weeks of treatment with current regimen - unable to de-escalate as cultures have been negative.  Daughter concerned about the cefepime. Patient apparently had similar symptoms of lethargy and confusion when on rocephin in February 2022 which improved after rocephin was discontinued. Unclear if rocephin was the etiology at that time, symptoms may have been due to her illness. Daughter would like to try a different antibiotic if possible. Following this ID stopped cefepime and metronidazole and started meropenem.  Also remained on Vancomycin.    8/16: s/p IR G tube placement  8/17:CT reviewed  8/18: Detailed discussion with the patient and the daughter.  S/p lumbar puncture with opening pressure of 12 cmH2O  -  Continue meropenem, vancomycin IV.  Continue ID follow-up  -Mild skin redness.  May have to slow rate of vancomycin and monitor for any urticaria    08/19: Monitor headaches.  Await further neurosurgery review and ?  Blood patch versus further intervention.  We will also have ID and neurosurgery clarify if getting an MRI will  ?  I do not feel she is decisional.  I did discuss with daughter yesterday who feels it is related to claustrophobia.    Acute infectious encephalopathy due to above:  Appeared to become more alert but has not really made progress beyond that now for a few days.  She continues to rarely speak and doesn't consistently follow commands.    -Continue to treat underlying issues as noted above.    -Maintain normal sleep/wake cycle as able.  -Avoid opioid pain medications as able.    C. difficile colitis:  Patient was noted to have copious diarrhea on 8/1 and sample came back positive for C. Difficile.  -Continue oral vancomycin.  Will need a prolonged treatment given other abx use.  -Rectal tube still  in place, liquid stool output still variable and at times fairly significant.  Will leave in place today.  -ID following as noted above.     Acute on chronic anemia:  Prior labs show baseline hemoglobin of about 9-10. Hemoglobin on admission was 11.7. Has slowly trended down, now low enough to recommend PRBC transfusion on 8/8/22. No obvious ongoing bleeding noted. WBC and platelets - normal-elevated. Iron panel on 7/30/22 showed that her total iron was low at 14, binding capacity was low at 28, iron saturation was 6 and B12 was high and folate was normal.  -Repeat hemoglobin 8/8 lower at 6.4, then 6.9 on recheck. Transfused one unit PRBCs on 8/8/22.  -Hemoglobin stable/improved at 9.1 on 8/12/22.  She is iron deficient, received IV iron infusion x3 with first on 8/11/22.  -Continue to monitor, hgb stable above 8.      Sinus tachycardia:  -  Overall improved, still around 100 at times.  Treat underlying issues as noted.  Continue to monitor.     History of trigeminal neuralgia  -Continue PTA Trileptal and Topamax.     History of chronic pyloric ulcer  She had EGD done in 2020 which showed gastroduodenitis with a duodenal stricture.  -Continue PPI.     Hyperglycemia  Recent HbA1c was 5.6 on 7/19/22. Was not on any medication prior to admission.  -Blood sugars mildly elevated but overall fairly well controlled.  -Continue medium dose sliding scale insulin every 4 hours.    Recent Labs   Lab 08/19/22  0754 08/19/22  0559 08/19/22  0338 08/19/22  0029 08/18/22  2024 08/18/22  1626   * 173* 140* 105* 185* 139*     -Decreased Lantus to 10 units at bedtime 8/16     Intermittent Hypokalemia, Hyponatremia, Hypophosphatemia:  -Recheck and replace per protocol.  -Sodium down to 132 on 8/11/22, decreased free water flushes to 75 ml every 4 hours and now improved to 136 on 8/13/22.  -Continue to follow trend.     Hypernatremia 08/18: Increase free water flushes  08/19; increase free water flushes to 150 ml  every 4  "hrs    Severe protein calorie malnutrition:  -Dietitian consulted.  Currently getting tube feedings and free water flushes via NG feeding tube.  -Will need SLP consult when more able to participate.  If not making more neuro progress by Monday/Tuesday consider as part of goals of care discussions longer term FT placement.     Physical deconditioning:  -Patient seems severely deconditioned.  -PT/OT as able, not really able to substantially participate at this point due to her mental status.        Diet: Adult Formula Drip Feeding: Continuous Jevity 1.5; Nasogastric tube; Goal Rate: 50; mL/hr; Medication - Feeding Tube Flush Frequency: At least 15-30 mL water before and after medication administration and with tube clogging; Amount to Send (Nutrit...  NPO for Medical/Clinical Reasons Except for: Other; Specify: NPO For oral intake and gastric feedings for 6 hours post insertion Gastrostomy G/GJ tube. May feed through Jejunal or Distal PORT immediately for GJ tubes ONLY.    DVT Prophylaxis: Pneumatic Compression Devices  Abrams Catheter: Not present  Central Lines: PRESENT  PICC Triple Lumen 07/22/22 Right Basilic Vascular access-Site Assessment: WDL except;Other (Comment) (no blood return)  Cardiac Monitoring: None  Code Status: Full Code      Disposition Plan      Expected Discharge Date: 08/20/2022        Discharge Comments: Needs NG feeding tube placed        The patient's care was discussed with the Bedside Nurse and Patient.    Timmy Grover MD, MD  Hospitalist Service  [facility]  Securely message with the Vocera Web Console (learn more here)  Text page via [employer] Paging/Directory     \"This dictation was performed with voice recognition software and may contain errors,  omissions and inadvertent word substitution.\"     Clinically Significant Risk Factors Present on Admission                    ______________________________________________________________________    Interval History   As " per RN patient complaining of 10/10 headache    To me patient says headache has been stable.  Denies any new nausea/vomiting  Responds intermittently to questions  4 point ROS done and negative unless mentioned    Data reviewed today: I reviewed all medications, new labs and imaging results over the last 24 hours. I personally reviewed no images or EKG's today.    Physical Exam   /64 (BP Location: Left arm)   Pulse 95   Temp 97.3  F (36.3  C) (Axillary)   Resp 18   Wt 51.8 kg (114 lb 4.8 oz)   SpO2 99%   BMI 19.02 kg/m    HEENT-  head incision C/d/i further as per neurosurgery  CVS- I+II+ no m/r/g  RS- CTAB  Abdo- soft, no tenderness. No g/r/r  Ext- no edema   CNS: She knows she is in the hospital.  Obeying instructions    Data    BMP  Recent Labs   Lab 08/19/22  0754 08/19/22  0559 08/19/22  0338 08/19/22  0029 08/18/22  2024 08/18/22  1826 08/18/22  0819 08/18/22  0601 08/18/22  0012 08/17/22  2021 08/17/22  0409 08/17/22  0200 08/15/22  0749 08/15/22  0548 08/14/22  0808 08/14/22  0502   NA  --  149*  --   --   --   --   --  149*  --   --   --   --   --  137  --  140   POTASSIUM  --  3.7  --   --   --  3.7  --  2.7*  --   --   --  3.5   < > 3.7  --  3.5  3.5   CHLORIDE  --  119*  --   --   --   --   --  114*  --   --   --   --   --  107  --  106   ADY  --  8.5  --   --   --   --   --  7.9*  --   --   --   --   --  9.1  --  8.9   CO2  --  27  --   --   --   --   --  29  --   --   --   --   --  27  --  28   BUN  --  16  --   --   --   --   --  13  --   --   --   --   --  15  --  12   CR  --  0.42*  --   --   --   --   --  0.43*  --  0.46*  --   --   --  0.38*  --  0.44*   * 173* 140* 105*   < >  --    < > 134*   < >  --    < >  --    < > 159*   < > 145*    < > = values in this interval not displayed.     CBC  Recent Labs   Lab 08/19/22  0559 08/18/22  0601 08/13/22  0558   WBC 11.6* 21.1* 8.4   RBC 3.89 4.13 3.92   HGB 9.2* 9.7* 8.5*   HCT 33.3* 34.6* 30.0*   MCV 86 84 77*   MCH 23.7* 23.5*  21.7*   MCHC 27.6* 28.0* 28.3*   RDW 28.3* 27.6* 21.1*   * 746* 560*     INRNo lab results found in last 7 days.  LFTs  No lab results found in last 7 days.   PANCNo lab results found in last 7 days.    Recent Results (from the past 24 hour(s))   XR Lumbar Puncture Spinal Tap Diag    Narrative    EXAM: XR LUMBAR PUNCTURE SPINAL TAP DIAGNOSTIC  8/18/2022 12:25 PM       History:  LP for opening pressure and repeat CSF cultures, admit for  severe sepsis and ventriculitis with abscess now with mild to moderate  increased hydro.     PROCEDURE: The patient consented for a lumbar puncture. The benefits  and risks of the procedure were explained to the patient, including  but not limited to worsening headache, hemorrhage, infection, lower  extremity pain, or nerve root injury. The patient was sterilely  prepped and draped with the patient in the supine position, over the  lower back. Under fluoroscopic guidance, the interlaminar spaces were  noted. 1% lidocaine was administered for local anesthetic over the  L2-3 interlaminar space, and a 22 gauge needle was advanced into the  thecal sac under fluoroscopic guidance.  There was initial aspiration  of clear CSF. Approximately 8.5 mL of clear CSF was then aspirated.   The needle was removed. There were no immediate complications  associated with the procedure.   Estimated blood loss during the  procedure was less than 5 mL.    Opening Pressure: 12 cm of water. Aspiration required in order to  obtain CSF.  Fluoro time: 0.1 minutes   Images Obtained: 2      Impression    IMPRESSION: Successful lumbar puncture.    NATI DEAN PA-C         SYSTEM ID:  [id number]

## 2022-08-19 NOTE — PROGRESS NOTES
CLINICAL NUTRITION SERVICES - REASSESSMENT NOTE      Malnutrition: (8/4)  % Weight Loss:  > 10% in 6 months (severe malnutrition)  % Intake:  Decreased intake does not meet criteria - EN now meeting needs   Subcutaneous Fat Loss:  Orbital region moderate depletion  Muscle Loss:  Temporal region moderate depletion, Clavicle bone region moderate depletion, Acromion bone region moderate-severe depletion and Dorsal hand region severe depletion  Fluid Retention:  None noted     Malnutrition Diagnosis: Severe malnutrition  In Context of:  Acute illness or injury        EVALUATION OF PROGRESS TOWARD GOALS   Diet:    NPO    Nutrition Support:    Type of Feeding Tube: 18 Fr PEG (placed 8/16)  Enteral Frequency:  Continuous  Enteral Regimen: Jevity 1.5 at 50 mL/hr (goal rate)  Total Enteral Provisions: 1800 kcal (37 kcal/kg), 76 g protein (1.6 g/kg), 912 mL H2O, 258 g CHO, 25 g fiber   Free Water Flush: 125 mL every 4 hours (8/18 per MD)    8/15: Banatrol TID = 120 cals, 6 gm pro   T: 1920 (40 cals/kg), 31 gm fiber     Liquid MVI/M daily     Intake/Tolerance:    Chart reviewed    8/16: K 3.2 (L)  8/17: K 3.5  8/18: K 2.7 (L) --> 3.7  8/19: Na 149 (H)           K 3.7    Pt receiving K replacement past few days (which has diarrhea as a side effect)  Pt also on vanco for cdiff    Rectal tube with 100 mL output past 24 hrs  8/17: 1000 mL stool  8/16: BM x1 noted          ASSESSED NUTRITION NEEDS:  Dosing Weight 48.3 kg (7/27 - admit wt)  Estimated Energy Needs: 7651-9600 kcals (35-40 Kcal/Kg)  Justification: repletion and underweight  Estimated Protein Needs: 70-95 grams protein (1.5-2 g pro/Kg)  Justification: repletion       NEW FINDINGS:     Net I/O (since 8/5): +12,528 mL    08/15/22 0400 51.8 kg (114 lb 4.8 oz) Bed scale   08/14/22 0500 55.7 kg (122 lb 12.7 oz) Bed scale   08/13/22 0600 59.6 kg (131 lb 6.3 oz) Bed scale   08/12/22 0600 53.8 kg (118 lb 9.7 oz) Bed scale   08/11/22 0600 55 kg (121 lb 4.1 oz) Bed scale    08/10/22 0408 55.2 kg (121 lb 11.1 oz) Bed scale   08/09/22 0400 51.1 kg (112 lb 10.5 oz) Bed scale   08/08/22 0500 54.6 kg (120 lb 5.9 oz) Bed scale   08/06/22 0600 53 kg (116 lb 13.5 oz) --   08/05/22 0500 53.4 kg (117 lb 11.6 oz) Bed scale   08/04/22 0700 53.1 kg (117 lb 1 oz) Bed scale   08/03/22 0500 53.4 kg (117 lb 11.6 oz) Bed scale   08/02/22 0000 53.5 kg (117 lb 15.1 oz) Bed scale   08/01/22 0000 51.5 kg (113 lb 8.6 oz) Bed scale   07/31/22 0648 55.2 kg (121 lb 11.1 oz) Bed scale   07/30/22 0134 53.2 kg (117 lb 4.6 oz) Bed scale   07/28/22 0600 49.5 kg (109 lb 2 oz) Bed scale   07/28/22 0000 -- Bed scale   07/27/22 2100 48.3 kg (106 lb 7.7 oz) Bed scale         Previous Goals (8/15):   Stool <500 mL/hr   Evaluation: Met - in the past 24 hrs    EN to meet % estimated needs   Evaluation: Met    Previous Nutrition Diagnosis (8/15):   Altered GI function related to diarrhea as evidenced by liquid RT output >/=500  mL/day in the past 3+ days  Evaluation: Improving        CURRENT NUTRITION DIAGNOSIS  No nutrition diagnosis identified at this time       INTERVENTIONS  Recommendations / Nutrition Prescription  NPO    Recommend continue with current EN regimen      Goals  EN to meet % estimated needs  Stool output to be <500 mL/day      MONITORING AND EVALUATION:  Progress towards goals will be monitored and evaluated per protocol and Practice Guidelines    Nadeen Candelario RD, LD  Clinical Dietitian - [facility]   Pager - [phone]

## 2022-08-19 NOTE — PLAN OF CARE
Goal Outcome Evaluation:      Pt here with Hydrocephalus, Sepsis, Ventriculitis, C-diff, MRSA. A&O to self and place only. Neuros Generalized weakness, unable to follow commands, word finding difficult. VSS. Tele NS. NPO diet cont. Tube feeding 50 mL/hr. Takes pills crush in TF. Up with A2 lift. C/O headache 10/10 with no relief from PRNs. Pt scoring green on the Aggression Stop Light Tool. Plan CT done today. Discharge pending.

## 2022-08-19 NOTE — PROGRESS NOTES
[facility]    Infectious Disease Progress Note    Date of Service : 08/19/2022     Assessment:  Patient transferred 7/27 from [facility] for concern for brain abscess (recovered COVID) - MRI suggestive of ventriculitis with left lateral ventricle abscess. Has been on broad spectrum antibiotics, now on Meropenem/Vancomycin, mental status improved, s/p left frontal ventriculostomy  CSF cxs have remained negative. Course complicated by development of C.diff colitis. Now has worsening headache but CSF cell count Is improved and appears more lymphocytic      Recommendations  1. Continue current antimicrobial therapy  2. Meningitis/encephalitis panel added to cSF  3. Cause for headache ? CSF leak -NS to assess    Discussed with Dr Lenore Hudson MD    Interval History   Resting, arouzable and able to converse, complais of bad headache and declines MRI    Physical Exam   Temp: 97.3  F (36.3  C) Temp src: Axillary BP: 110/64 Pulse: 95   Resp: 18 SpO2: 99 % O2 Device: None (Room air)    Vitals:    08/13/22 0600 08/14/22 0500 08/15/22 0400   Weight: 59.6 kg (131 lb 6.3 oz) 55.7 kg (122 lb 12.7 oz) 51.8 kg (114 lb 4.8 oz)     Vital Signs with Ranges  Temp:  [97.3  F (36.3  C)-98.7  F (37.1  C)] 97.3  F (36.3  C)  Pulse:  [92-99] 95  Resp:  [18-20] 18  BP: (104-112)/(53-65) 110/64  SpO2:  [96 %-99 %] 99 %    Constitutional: keeps eyes closed cooperative, answers questions,  Lungs: Clear to auscultation   Cardiovascular: S1S2  Abdomen: soft, g tube in place  Skin: No rash  MS : PICC in place      Other:    Medications     - MEDICATION INSTRUCTIONS -         banatrol plus  1 packet Per Feeding Tube TID w/meals     insulin aspart  1-6 Units Subcutaneous Q4H     insulin glargine  10 Units Subcutaneous At Bedtime     levETIRAcetam  1,000 mg Intravenous Q12H     meropenem  2 g Intravenous Q8H     miconazole with skin protectant   Topical BID     multivitamins w/minerals  15 mL Per Feeding Tube Daily      OXcarbazepine  450 mg Oral or Feeding Tube At Bedtime     pantoprazole  40 mg Oral or Feeding Tube QAM AC     sodium chloride (PF)  10-40 mL Intracatheter Q7 Days     topiramate  50 mg Oral or Feeding Tube At Bedtime     vancomycin  125 mg Oral or Feeding Tube 4x Daily     vancomycin  1,250 mg Intravenous Q24H       Data   All microbiology laboratory data reviewed.  Recent Labs   Lab Test 08/19/22  0559 08/18/22  0601 08/13/22  0558   WBC 11.6* 21.1* 8.4   HGB 9.2* 9.7* 8.5*   HCT 33.3* 34.6* 30.0*   MCV 86 84 77*   * 746* 560*     Recent Labs   Lab Test 08/19/22  0559 08/18/22  0601 08/17/22 2021   CR 0.42* 0.43* 0.46*     Recent Labs   Lab Test 08/08/20  0212   SED 13     Component      Latest Ref Rng & Units 8/18/2022   Tube Number       4   Color CSF      Colorless Colorless   Appearance CSF      Clear Clear   Total Nucleated Cells      0 - 5 /uL 56 (H)   RBC CSF      0 - 2 /uL 0   % Neutrophils CSF      % 18   % Lymphocytes CSF      % 55   % Mono/Macros CSF      % 27   Glucose CSF      40 - 70 mg/dL 67     Component      Latest Ref Rng & Units 8/18/2022   Protein Total CSF      15 - 60 mg/dL 159 (H)     Imaging   8/19 CT head  CT OF THE HEAD WITHOUT CONTRAST 8/19/2022 12:40 PM      COMPARISON: Head CT 8/17/2022, brain MRI 7/30/2022.     HISTORY: Headache. No applicable indication.     TECHNIQUE: 5 mm thick axial CT images of the head were acquired  without IV contrast material.     FINDINGS: Hypodensity in the white matter of the anterior inferior  frontal lobe on the left adjacent to the frontal horn of the left  lateral ventricle again noted likely representing vasogenic edema  related to ventriculitis of the left lateral ventricle. Lateral  ventricle remains enlarged compared to the third and right lateral  ventricles but no evidence for worsening hydrocephalus. There is  rightward bowing of the septum pellucidum but no significant rightward  midline shift. There is moderate diffuse cerebral volume  loss. There  are subtle patchy areas of decreased density in the cerebral white  matter bilaterally that are consistent with sequela of chronic small  vessel ischemic disease. There are no extra-axial fluid collections.      No intracranial hemorrhage, mass or recent infarct.     The visualized paranasal sinuses are well-aerated. There is no  mastoiditis. There are no fractures of the visualized bones.                                                                      IMPRESSION:   1. Stable presumed vasogenic edema in the white matter of the anterior  inferior left frontal lobe related to ventriculitis of the left  lateral ventricle.  2. Stable ventriculomegaly of the left lateral ventricle. Interval  decrease in volume of gas in the left lateral ventricle since the  recent comparison study.  3. Diffuse cerebral volume loss and cerebral white matter changes  consistent with chronic small vessel ischemic disease.

## 2022-08-20 LAB
ANION GAP SERPL CALCULATED.3IONS-SCNC: 2 MMOL/L (ref 3–14)
BUN SERPL-MCNC: 17 MG/DL (ref 7–30)
CALCIUM SERPL-MCNC: 9 MG/DL (ref 8.5–10.1)
CHLORIDE BLD-SCNC: 117 MMOL/L (ref 94–109)
CO2 SERPL-SCNC: 30 MMOL/L (ref 20–32)
CREAT SERPL-MCNC: 0.48 MG/DL (ref 0.52–1.04)
ERYTHROCYTE [DISTWIDTH] IN BLOOD BY AUTOMATED COUNT: 28 % (ref 10–15)
GFR SERPL CREATININE-BSD FRML MDRD: >90 ML/MIN/1.73M2
GLUCOSE BLD-MCNC: 96 MG/DL (ref 70–99)
GLUCOSE BLDC GLUCOMTR-MCNC: 100 MG/DL (ref 70–99)
GLUCOSE BLDC GLUCOMTR-MCNC: 106 MG/DL (ref 70–99)
GLUCOSE BLDC GLUCOMTR-MCNC: 107 MG/DL (ref 70–99)
GLUCOSE BLDC GLUCOMTR-MCNC: 113 MG/DL (ref 70–99)
GLUCOSE BLDC GLUCOMTR-MCNC: 142 MG/DL (ref 70–99)
GLUCOSE BLDC GLUCOMTR-MCNC: 93 MG/DL (ref 70–99)
HCT VFR BLD AUTO: 32.9 % (ref 35–47)
HGB BLD-MCNC: 9.3 G/DL (ref 11.7–15.7)
HIV 1+2 AB+HIV1 P24 AG SERPL QL IA: NONREACTIVE
MCH RBC QN AUTO: 23.8 PG (ref 26.5–33)
MCHC RBC AUTO-ENTMCNC: 28.3 G/DL (ref 31.5–36.5)
MCV RBC AUTO: 84 FL (ref 78–100)
PHOSPHATE SERPL-MCNC: 1.8 MG/DL (ref 2.5–4.5)
PLATELET # BLD AUTO: 660 10E3/UL (ref 150–450)
POTASSIUM BLD-SCNC: 3.4 MMOL/L (ref 3.4–5.3)
POTASSIUM BLD-SCNC: 4.4 MMOL/L (ref 3.4–5.3)
RBC # BLD AUTO: 3.9 10E6/UL (ref 3.8–5.2)
SODIUM SERPL-SCNC: 149 MMOL/L (ref 133–144)
WBC # BLD AUTO: 7.7 10E3/UL (ref 4–11)

## 2022-08-20 PROCEDURE — 258N000003 HC RX IP 258 OP 636: Performed by: HOSPITALIST

## 2022-08-20 PROCEDURE — 80048 BASIC METABOLIC PNL TOTAL CA: CPT | Performed by: INTERNAL MEDICINE

## 2022-08-20 PROCEDURE — 250N000013 HC RX MED GY IP 250 OP 250 PS 637: Performed by: HOSPITALIST

## 2022-08-20 PROCEDURE — 85027 COMPLETE CBC AUTOMATED: CPT | Performed by: INTERNAL MEDICINE

## 2022-08-20 PROCEDURE — 99233 SBSQ HOSP IP/OBS HIGH 50: CPT | Performed by: SPECIALIST

## 2022-08-20 PROCEDURE — 99233 SBSQ HOSP IP/OBS HIGH 50: CPT | Performed by: HOSPITALIST

## 2022-08-20 PROCEDURE — 250N000011 HC RX IP 250 OP 636: Performed by: HOSPITALIST

## 2022-08-20 PROCEDURE — 250N000011 HC RX IP 250 OP 636: Performed by: INTERNAL MEDICINE

## 2022-08-20 PROCEDURE — 999N000190 HC STATISTIC VAT ROUNDS

## 2022-08-20 PROCEDURE — 84132 ASSAY OF SERUM POTASSIUM: CPT | Performed by: HOSPITALIST

## 2022-08-20 PROCEDURE — 250N000013 HC RX MED GY IP 250 OP 250 PS 637: Performed by: INTERNAL MEDICINE

## 2022-08-20 PROCEDURE — 84100 ASSAY OF PHOSPHORUS: CPT | Performed by: INTERNAL MEDICINE

## 2022-08-20 PROCEDURE — 258N000003 HC RX IP 258 OP 636: Performed by: INTERNAL MEDICINE

## 2022-08-20 PROCEDURE — 120N000001 HC R&B MED SURG/OB

## 2022-08-20 RX ORDER — POTASSIUM CHLORIDE 20MEQ/15ML
40 LIQUID (ML) ORAL ONCE
Status: COMPLETED | OUTPATIENT
Start: 2022-08-20 | End: 2022-08-20

## 2022-08-20 RX ADMIN — POTASSIUM & SODIUM PHOSPHATES POWDER PACK 280-160-250 MG 2 PACKET: 280-160-250 PACK at 20:50

## 2022-08-20 RX ADMIN — Medication 1 PACKET: at 09:29

## 2022-08-20 RX ADMIN — MICONAZOLE NITRATE: 20 CREAM TOPICAL at 22:25

## 2022-08-20 RX ADMIN — INSULIN ASPART 1 UNITS: 100 INJECTION, SOLUTION INTRAVENOUS; SUBCUTANEOUS at 20:47

## 2022-08-20 RX ADMIN — LEVETIRACETAM 1000 MG: 10 INJECTION INTRAVENOUS at 22:26

## 2022-08-20 RX ADMIN — ACETAMINOPHEN 650 MG: 325 SUSPENSION ORAL at 18:37

## 2022-08-20 RX ADMIN — LEVETIRACETAM 1000 MG: 10 INJECTION INTRAVENOUS at 09:16

## 2022-08-20 RX ADMIN — OXYCODONE HYDROCHLORIDE 5 MG: 5 TABLET ORAL at 02:18

## 2022-08-20 RX ADMIN — POTASSIUM & SODIUM PHOSPHATES POWDER PACK 280-160-250 MG 2 PACKET: 280-160-250 PACK at 12:57

## 2022-08-20 RX ADMIN — VANCOMYCIN HYDROCHLORIDE 1250 MG: 5 INJECTION, POWDER, LYOPHILIZED, FOR SOLUTION INTRAVENOUS at 09:29

## 2022-08-20 RX ADMIN — ACETAMINOPHEN 650 MG: 325 SUSPENSION ORAL at 22:25

## 2022-08-20 RX ADMIN — ACETAMINOPHEN 650 MG: 325 SUSPENSION ORAL at 06:44

## 2022-08-20 RX ADMIN — OXYCODONE HYDROCHLORIDE 2.5 MG: 5 TABLET ORAL at 06:44

## 2022-08-20 RX ADMIN — MEROPENEM 2 G: 1 INJECTION, POWDER, FOR SOLUTION INTRAVENOUS at 12:35

## 2022-08-20 RX ADMIN — MICONAZOLE NITRATE: 20 CREAM TOPICAL at 10:06

## 2022-08-20 RX ADMIN — TOPIRAMATE 50 MG: 50 TABLET ORAL at 22:25

## 2022-08-20 RX ADMIN — VANCOMYCIN HYDROCHLORIDE 125 MG: KIT at 12:35

## 2022-08-20 RX ADMIN — Medication 15 ML: at 09:28

## 2022-08-20 RX ADMIN — POTASSIUM CHLORIDE 40 MEQ: 20 SOLUTION ORAL at 12:34

## 2022-08-20 RX ADMIN — MEROPENEM 2 G: 1 INJECTION, POWDER, FOR SOLUTION INTRAVENOUS at 20:50

## 2022-08-20 RX ADMIN — VANCOMYCIN HYDROCHLORIDE 125 MG: KIT at 22:26

## 2022-08-20 RX ADMIN — Medication 40 MG: at 09:28

## 2022-08-20 RX ADMIN — ACETAMINOPHEN 650 MG: 325 SUSPENSION ORAL at 12:35

## 2022-08-20 RX ADMIN — Medication 1 PACKET: at 12:33

## 2022-08-20 RX ADMIN — MEROPENEM 2 G: 1 INJECTION, POWDER, FOR SOLUTION INTRAVENOUS at 05:02

## 2022-08-20 RX ADMIN — OXCARBAZEPINE 450 MG: 300 SUSPENSION ORAL at 22:26

## 2022-08-20 RX ADMIN — OXYCODONE HYDROCHLORIDE 2.5 MG: 5 TABLET ORAL at 12:34

## 2022-08-20 RX ADMIN — VANCOMYCIN HYDROCHLORIDE 125 MG: KIT at 09:28

## 2022-08-20 RX ADMIN — ACETAMINOPHEN 650 MG: 325 SUSPENSION ORAL at 02:19

## 2022-08-20 RX ADMIN — POTASSIUM & SODIUM PHOSPHATES POWDER PACK 280-160-250 MG 2 PACKET: 280-160-250 PACK at 16:37

## 2022-08-20 RX ADMIN — OXYCODONE HYDROCHLORIDE 2.5 MG: 5 TABLET ORAL at 22:25

## 2022-08-20 RX ADMIN — Medication 1 PACKET: at 18:35

## 2022-08-20 RX ADMIN — VANCOMYCIN HYDROCHLORIDE 125 MG: KIT at 18:33

## 2022-08-20 ASSESSMENT — ACTIVITIES OF DAILY LIVING (ADL)
ADLS_ACUITY_SCORE: 46
ADLS_ACUITY_SCORE: 39
ADLS_ACUITY_SCORE: 42
ADLS_ACUITY_SCORE: 39
ADLS_ACUITY_SCORE: 42
ADLS_ACUITY_SCORE: 42
ADLS_ACUITY_SCORE: 46
ADLS_ACUITY_SCORE: 39

## 2022-08-20 NOTE — PROGRESS NOTES
[facility]    Neurosurgery Progress Note    Date of Service (when I saw the patient): 08/20/2022     Assessment & Plan     Procedure(s):  LEFT FRONTAL VENTRICULOSTOMY   -20 Days Post-Op  12 Days Post-Op removal of EVD  Today she was seen lying in bed. She appears comfortable. She is alert and follows some commands. She is able to move all extremities with equal strength. No growth on recent CSF cultures.    Plan:  -Continue to follow CSF cultures  -Appreciate assistance from other services     I have discussed the following assessment and plan Dr. Alatorre who is in agreement with initial plan and will follow up with further consultation recommendations.    Hafsa Layton CNP  [facility]  [facility]  [address]  [address]  [place] [address]    Tel [phone]  Pager [phone]      Interval History   Stable.    Physical Exam   Temp: 97.5  F (36.4  C) Temp src: Axillary BP: 129/67 Pulse: 89   Resp: 16 SpO2: 94 % O2 Device: None (Room air)    Vitals:    08/13/22 0600 08/14/22 0500 08/15/22 0400   Weight: 131 lb 6.3 oz (59.6 kg) 122 lb 12.7 oz (55.7 kg) 114 lb 4.8 oz (51.8 kg)     Vital Signs with Ranges  Temp:  [97.1  F (36.2  C)-98.4  F (36.9  C)] 97.5  F (36.4  C)  Pulse:  [] 89  Resp:  [16-20] 16  BP: (104-129)/(53-70) 129/67  SpO2:  [94 %-99 %] 94 %  I/O last 3 completed shifts:  In: 1437 [NG/GT:1156]  Out: 2400 [Urine:1700; Stool:700]     , Blood pressure 129/67, pulse 89, temperature 97.5  F (36.4  C), temperature source Axillary, resp. rate 16, weight 114 lb 4.8 oz (51.8 kg), SpO2 94 %, not currently breastfeeding.  114 lbs 4.8 oz  HEENT:  Normocephalic.  PERRLA.    Heart:  No peripheral edema  Lungs:  No SOB  Skin:  Warm and dry, good capillary refill. Incision CDI.  Extremities:  Good radial and dorsalis pedis pulses bilaterally, no edema, cyanosis or clubbing.    NEUROLOGICAL EXAMINATION:   Mental status:  Alert and  follows commands  Motor:  LAINEZ    Medications     - MEDICATION INSTRUCTIONS -         banatrol plus  1 packet Per Feeding Tube TID w/meals     insulin aspart  1-6 Units Subcutaneous Q4H     insulin glargine  10 Units Subcutaneous At Bedtime     levETIRAcetam  1,000 mg Intravenous Q12H     meropenem  2 g Intravenous Q8H     miconazole with skin protectant   Topical BID     multivitamins w/minerals  15 mL Per Feeding Tube Daily     OXcarbazepine  450 mg Oral or Feeding Tube At Bedtime     pantoprazole  40 mg Oral or Feeding Tube QAM AC     sodium chloride (PF)  10-40 mL Intracatheter Q7 Days     topiramate  50 mg Oral or Feeding Tube At Bedtime     vancomycin  125 mg Oral or Feeding Tube 4x Daily     vancomycin  1,250 mg Intravenous Q24H       Data     CBC RESULTS:   Recent Labs   Lab Test 08/20/22  0542   WBC 7.7   RBC 3.90   HGB 9.3*   HCT 32.9*   MCV 84   MCH 23.8*   MCHC 28.3*   RDW 28.0*   *     Basic Metabolic Panel:  Lab Results   Component Value Date     08/20/2022      Lab Results   Component Value Date    POTASSIUM 3.4 08/20/2022     Lab Results   Component Value Date    CHLORIDE 117 08/20/2022     Lab Results   Component Value Date    ADY 9.0 08/20/2022     Lab Results   Component Value Date    CO2 30 08/20/2022     Lab Results   Component Value Date    BUN 17 08/20/2022     Lab Results   Component Value Date    CR 0.48 08/20/2022     Lab Results   Component Value Date     08/20/2022    GLC 96 08/20/2022     INR:  Lab Results   Component Value Date    INR 1.34 07/29/2022    INR 1.13 07/27/2022    INR 1.36 07/19/2022    INR 1.05 02/07/2022    INR 0.97 02/06/2019

## 2022-08-20 NOTE — PROGRESS NOTES
[facility]    Medicine Progress Note - Hospitalist Service    Date of Admission:  7/27/2022    Assessment & Plan          Summary of Stay:  Julisa Chaney is a 77-year-old woman with medical history which includes trigeminal neuralgia who was recently admitted to [facility] on 7/19 for severe sepsis(had leukocytosis, lactic acidosis and elevated procalcitonin) due to COVID-19 infection and suspected bacterial pneumonia. Recent COVID-19 infection which was diagnosed on 7/19/2022 and was treated with 5 days of remdesivir and did not require any steroids and she was treated with IV vancomycin and IV meropenem.     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  She was switched to IV vancomycin, cefepime and metronidazole and transferred to [facility] for neurosurgery assessment. MRI brain w/wo contrast 7/30 showed ventriculitis with probable abscess. Neurosurgery/neurology/infectious disease/oncology consulted. Patient taken to the OR on 7/31 for left frontal ventriculostomy.   Since then has remained on broad spectrum antibiotics.  In addition she has developed C. Difficile colitis and is on treatment.  She has continued to have a significant encephalopathy.    Severe sepsis secondary to ventriculitis with abscess  Status left frontal ventriculostomy 7/31/22:  -Appreciate neurosurgery, oncology, infectious disease service assistance.  -Neurosurgery and oncology were consulted and flow cytometry was done which did not show any evidence of malignancy.  -Patient underwent left frontal ventriculostomy on 7/31/2022. Post-op management per neurosurgery team.  -CSF sent on 8/5 which revealed , , glucose 98 and protein 84.  WBC this AM 25, . CSF 2cc for repeat labs. CSF with sediment. EVD was clamped on 8/6 and removed on 8/8.  -Was on IV vancomycin, cefepime, and metronidazole. ID planning 6 weeks of treatment with current regimen - unable to de-escalate as cultures have been negative.  Daughter concerned about the cefepime. Patient apparently had similar symptoms of lethargy and confusion when on rocephin in February 2022 which improved after rocephin was discontinued. Unclear if rocephin was the etiology at that time, symptoms may have been due to her illness. Daughter would like to try a different antibiotic if possible. Following this ID stopped cefepime and metronidazole and started meropenem.  Also remained on Vancomycin.  8/16: s/p IR G tube placement  8/17:CT reviewed  8/18: S/p lumbar puncture with opening pressure of 12 cmH2O  08/20: headaches reported by staff.    - Continue meropenem, vancomycin IV.   - Follow cultures.  - Pain meds prn.  - Further plan per ID/neurosurgery.      Acute infectious encephalopathy due to above:  Appeared to become more alert but has not really made progress beyond that now for a few days.  She continues to rarely speak and doesn't consistently follow commands.    -Continue to treat underlying issues as noted above.    -Maintain normal sleep/wake cycle as able.  -Avoid opioid pain medications as able.    C. difficile colitis:  Patient was noted to have copious diarrhea on 8/1 and sample came back positive for C. Difficile.  -Continue oral vancomycin.  Will need a prolonged treatment given other abx use.  -Rectal tube still in place, liquid stool output still variable and at times fairly significant.  Will leave in place today.  -ID following as noted above.     Acute on chronic anemia:  Prior labs show baseline hemoglobin of about 9-10. Hemoglobin on admission was 11.7. Has slowly trended down, now low enough to recommend PRBC transfusion on 8/8/22. No obvious ongoing bleeding noted. WBC  and platelets - normal-elevated. Iron panel on 7/30/22 showed that her total iron was low at 14, binding capacity was low at 28, iron saturation was 6 and B12 was high and folate was normal.  -Repeat hemoglobin 8/8 lower at 6.4, then 6.9 on recheck. Transfused one unit PRBCs on 8/8/22.  -Hemoglobin stable/improved at 9.1 on 8/12/22.  She is iron deficient, received IV iron infusion x3 with first on 8/11/22.  -Continue to monitor, hgb stable above 8.      Sinus tachycardia:  -  Overall improved, still around 100 at times.  Treat underlying issues as noted.  Continue to monitor.     History of trigeminal neuralgia  -Continue PTA Trileptal and Topamax.     History of chronic pyloric ulcer  She had EGD done in 2020 which showed gastroduodenitis with a duodenal stricture.  -Continue PPI.     Hyperglycemia  Recent HbA1c was 5.6 on 7/19/22. Was not on any medication prior to admission.  -Blood sugars mildly elevated but overall fairly well controlled.  -Continue medium dose sliding scale insulin every 4 hours.    Recent Labs   Lab 08/20/22  0542 08/20/22  0500 08/20/22  0017 08/19/22  2053 08/19/22  1635 08/19/22  1330   GLC 96 107* 93 146* 119* 94     -Decreased Lantus to 10 units at bedtime 8/16     Intermittent Hypokalemia, Hyponatremia (now hyper), Hypophosphatemia:  -Recheck and replace per protocol.  -Continue to follow trend.     Hypernatremia: 149--149.  08/20; increase free water flushes to 200 ml  every 4 hrs    Severe protein calorie malnutrition:  -Dietitian consulted.  Currently getting tube feedings and free water flushes via NG feeding tube.  -Will need SLP consult when more able to participate.  If not making more neuro progress by Monday/Tuesday consider as part of goals of care discussions longer term FT placement.     Physical deconditioning:  -Patient seems severely deconditioned.  -PT/OT as able, not really able to substantially participate at this point due to her mental status.        Diet: TF  DVT  Prophylaxis: Pneumatic Compression Devices  Abrams Catheter: Not present  Central Lines: PRESENT  PICC Triple Lumen 07/22/22 Right Basilic Vascular access-Site Assessment: WDL  Cardiac Monitoring: None  Code Status: Full Code      Disposition Plan }  Expected Discharge Date: TBD      Amparo Cristobal MD  Hospitalist Service  [facility]    ______________________________________________________________________    Interval History   Reported headache this am.        Physical Exam   /70   Pulse 91   Temp 97.1  F (36.2  C)   Resp 16   Wt 51.8 kg (114 lb 4.8 oz)   SpO2 97%   BMI 19.02 kg/m    HEENT-  head incision C/d/i further as per neurosurgery  CVS- I+II+ no m/r/g  RS- CTAB  Abdo- soft, no tenderness. No g/r/r  Ext- no edema   CNS: She knows she is in the hospital.  Obeying instructions    Data    BMP  Recent Labs   Lab 08/20/22  0542 08/20/22  0500 08/20/22  0017 08/19/22  2053 08/19/22  0754 08/19/22  0559 08/18/22 2024 08/18/22  1826 08/18/22  0819 08/18/22  0601 08/18/22  0012 08/17/22  2021 08/15/22  0749 08/15/22  0548   *  --   --   --   --  149*  --   --   --  149*  --   --   --  137   POTASSIUM 3.4  --   --   --   --  3.7  --  3.7  --  2.7*  --   --    < > 3.7   CHLORIDE 117*  --   --   --   --  119*  --   --   --  114*  --   --   --  107   ADY 9.0  --   --   --   --  8.5  --   --   --  7.9*  --   --   --  9.1   CO2 30  --   --   --   --  27  --   --   --  29  --   --   --  27   BUN 17  --   --   --   --  16  --   --   --  13  --   --   --  15   CR 0.48*  --   --   --   --  0.42*  --   --   --  0.43*  --  0.46*  --  0.38*   GLC 96 107* 93 146*   < > 173*   < >  --    < > 134*   < >  --    < > 159*    < > = values in this interval not displayed.     CBC  Recent Labs   Lab 08/20/22  0542 08/19/22  0559 08/18/22  0601   WBC 7.7 11.6* 21.1*   RBC 3.90 3.89 4.13   HGB 9.3* 9.2* 9.7*   HCT 32.9* 33.3* 34.6*   MCV 84 86 84   MCH 23.8* 23.7* 23.5*   MCHC 28.3* 27.6* 28.0*    RDW 28.0* 28.3* 27.6*   * 663* 746*     INRNo lab results found in last 7 days.  LFTs  No lab results found in last 7 days.   PANCNo lab results found in last 7 days.    Recent Results (from the past 24 hour(s))   CT Head w/o Contrast    Narrative    CT OF THE HEAD WITHOUT CONTRAST 8/19/2022 12:40 PM     COMPARISON: Head CT 8/17/2022, brain MRI 7/30/2022.    HISTORY: Headache. No applicable indication.    TECHNIQUE: 5 mm thick axial CT images of the head were acquired  without IV contrast material.    FINDINGS: Hypodensity in the white matter of the anterior inferior  frontal lobe on the left adjacent to the frontal horn of the left  lateral ventricle again noted likely representing vasogenic edema  related to ventriculitis of the left lateral ventricle. Lateral  ventricle remains enlarged compared to the third and right lateral  ventricles but no evidence for worsening hydrocephalus. There is  rightward bowing of the septum pellucidum but no significant rightward  midline shift. There is moderate diffuse cerebral volume loss. There  are subtle patchy areas of decreased density in the cerebral white  matter bilaterally that are consistent with sequela of chronic small  vessel ischemic disease. There are no extra-axial fluid collections.     No intracranial hemorrhage, mass or recent infarct.    The visualized paranasal sinuses are well-aerated. There is no  mastoiditis. There are no fractures of the visualized bones.      Impression    IMPRESSION:   1. Stable presumed vasogenic edema in the white matter of the anterior  inferior left frontal lobe related to ventriculitis of the left  lateral ventricle.  2. Stable ventriculomegaly of the left lateral ventricle. Interval  decrease in volume of gas in the left lateral ventricle since the  recent comparison study.  3. Diffuse cerebral volume loss and cerebral white matter changes  consistent with chronic small vessel ischemic disease.      Radiation dose for  this scan was reduced using automated exposure  control, adjustment of the mA and/or kV according to patient size, or  iterative reconstruction technique.    CHEO POLK MD         SYSTEM ID:  [id number]

## 2022-08-20 NOTE — PROGRESS NOTES
[facility]    Infectious Disease Progress Note    Date of Service : 08/20/2022     Assessment:  Patient transferred 7/27 from [facility] for concern for brain abscess (recovered COVID) - MRI suggestive of ventriculitis with left lateral ventricle abscess. Has been on broad spectrum antibiotics, now on Meropenem/Vancomycin, mental status improved, s/p left frontal ventriculostomy  CSF cxs have remained negative. Course complicated by development of C.diff colitis. Now has ongoing headache but CSF cell count has improved and appears more lymphocytic . ? CSF leak     Recommendations  1. Continue current antimicrobial therapy  2. Follow cxs, clinical status and labs    Nuzhat Hudson MD    Interval History   Resting, alert and fully conversant, continues to complain of ongoing headache, has remained afebrile, CSF analysis and CT head improved.    Physical Exam   Temp: 97.1  F (36.2  C) Temp src: Oral BP: 120/70 Pulse: 91   Resp: 16 SpO2: 97 % O2 Device: None (Room air)    Vitals:    08/13/22 0600 08/14/22 0500 08/15/22 0400   Weight: 59.6 kg (131 lb 6.3 oz) 55.7 kg (122 lb 12.7 oz) 51.8 kg (114 lb 4.8 oz)     Vital Signs with Ranges  Temp:  [97.1  F (36.2  C)-98.4  F (36.9  C)] 97.1  F (36.2  C)  Pulse:  [] 91  Resp:  [16-20] 16  BP: (104-128)/(53-70) 120/70  SpO2:  [94 %-99 %] 97 %    Constitutional: Awake, alert, cooperative, complaining of pain, frail appearing female  Lungs: Clear to auscultation bilaterally, no crackles or wheezing  Cardiovascular: Regular rate and rhythm, normal S1 and S2, and no murmur noted  Abdomen: Normal bowel sounds, soft, non-distended, non-tender  Skin: No rashes, no cyanosis, no edema    Other:    Medications     - MEDICATION INSTRUCTIONS -         banatrol plus  1 packet Per Feeding Tube TID w/meals     insulin aspart  1-6 Units Subcutaneous Q4H     insulin glargine  10 Units Subcutaneous At Bedtime     levETIRAcetam  1,000 mg Intravenous Q12H     meropenem  2  g Intravenous Q8H     miconazole with skin protectant   Topical BID     multivitamins w/minerals  15 mL Per Feeding Tube Daily     OXcarbazepine  450 mg Oral or Feeding Tube At Bedtime     pantoprazole  40 mg Oral or Feeding Tube QAM AC     sodium chloride (PF)  10-40 mL Intracatheter Q7 Days     topiramate  50 mg Oral or Feeding Tube At Bedtime     vancomycin  125 mg Oral or Feeding Tube 4x Daily     vancomycin  1,250 mg Intravenous Q24H       Data   All microbiology laboratory data reviewed.  Recent Labs   Lab Test 08/20/22  0542 08/19/22  0559 08/18/22  0601   WBC 7.7 11.6* 21.1*   HGB 9.3* 9.2* 9.7*   HCT 32.9* 33.3* 34.6*   MCV 84 86 84   * 663* 746*     Recent Labs   Lab Test 08/20/22  0542 08/19/22  0559 08/18/22  0601   CR 0.48* 0.42* 0.43*     Recent Labs   Lab Test 08/08/20  0212   SED 13     Component      Latest Ref Rng & Units 8/18/2022   Tube Number       4   Color CSF      Colorless Colorless   Appearance CSF      Clear Clear   Total Nucleated Cells      0 - 5 /uL 56 (H)   RBC CSF      0 - 2 /uL 0   % Neutrophils CSF      % 18   % Lymphocytes CSF      % 55   % Mono/Macros CSF      % 27   Glucose CSF      40 - 70 mg/dL 67      Component      Latest Ref Rng & Units 8/18/2022   Protein Total CSF      15 - 60 mg/dL 159 (H)      Component      Latest Ref Rng & Units 8/18/2022   Escherichia coli K1      Negative Negative   Haemophilus influenzae      Negative Negative   Listeria monocytogenes      Negative Negative   Neisseria meningitidis      Negative Negative   Streptococcus agalactiae (GBS)      Negative Negative   Streptococcus pneumoniae      Negative Negative   Cytomegalovirus      Negative Negative   Enterovirus by PCR      Negative Negative   Herpes Simplex Virus 1      Negative Negative   Herpes Simplex Virus 2      Negative Negative   Human Herpes Virus 6      Negative Negative   Human Parechovirus      Negative Negative   Varicella Zoster Virus      Negative Negative   Cryptococcus  neoformans/gattii      Negative Negative     Imaging   8/19 CT head  CT OF THE HEAD WITHOUT CONTRAST 8/19/2022 12:40 PM      COMPARISON: Head CT 8/17/2022, brain MRI 7/30/2022.     HISTORY: Headache. No applicable indication.     TECHNIQUE: 5 mm thick axial CT images of the head were acquired  without IV contrast material.     FINDINGS: Hypodensity in the white matter of the anterior inferior  frontal lobe on the left adjacent to the frontal horn of the left  lateral ventricle again noted likely representing vasogenic edema  related to ventriculitis of the left lateral ventricle. Lateral  ventricle remains enlarged compared to the third and right lateral  ventricles but no evidence for worsening hydrocephalus. There is  rightward bowing of the septum pellucidum but no significant rightward  midline shift. There is moderate diffuse cerebral volume loss. There  are subtle patchy areas of decreased density in the cerebral white  matter bilaterally that are consistent with sequela of chronic small  vessel ischemic disease. There are no extra-axial fluid collections.      No intracranial hemorrhage, mass or recent infarct.     The visualized paranasal sinuses are well-aerated. There is no  mastoiditis. There are no fractures of the visualized bones.                                                                      IMPRESSION:   1. Stable presumed vasogenic edema in the white matter of the anterior  inferior left frontal lobe related to ventriculitis of the left  lateral ventricle.  2. Stable ventriculomegaly of the left lateral ventricle. Interval  decrease in volume of gas in the left lateral ventricle since the  recent comparison study.  3. Diffuse cerebral volume loss and cerebral white matter changes  consistent with chronic small vessel ischemic disease.

## 2022-08-20 NOTE — PLAN OF CARE
[id number] note. Pt admitted with hydrocephalus, vetriculitis. On enteric and contact precautions. Lethargic, arouses to voice, Oriented x1-2. VSS on RA. C/o headache 10/10 - no change with prn tylenol and oxycodone. MD aware - had repeat head CT - no change. Pt refusing MRI - attempted to explore reason for refusal, but pt just repeated that she did not want it even if she could receive antianxiety medication. Inconsistent, slow following commands. Generalized weakness - UEs 3-4/5, LEs 2/5. AJNN. Lung sounds diminished. PEG site WDL. Tube feed infusing at goal rate. Incontinent of bowel and bladder. Rectal tube intact (small amt of stool leaking around tube). Reposition q 2 hrs. Transfers with lift.

## 2022-08-20 NOTE — PROGRESS NOTES
Pt here with hydrocephalus, verticulitis. Arouses to voice, withdrawn, oriented to self. Neuros with generalized weakness, slow to respond, garbled speech. VSS on RA. Tele NSR. NPO, PEG running TF at goal with 150 mL Q4h. Up with Ax2 and lift. Incontinent, purewick and rectal tube in place. Pt continues to report 10/10 headache, sleeping between cares, tylenol and oxycodone 5mg given PRN. Pt scoring green on the Aggression Stop Light Tool. Pt continues to refuse MRI. Discharge pending.

## 2022-08-20 NOTE — PLAN OF CARE
Goal Outcome Evaluation:      Pt here with hydrocephalus, ventriculitis. A&O to self only. Neuros weakness, lethargi\c sometimes, slow speech, confused. VSS. Tele S Tach, low 100s. NPO diet, PEG 50 mL/hr. Takes pills via TF, 200 mL free water Q 4 hrs. Up with lift, repositioning Q2 hrs.,. K+ and Phosphorous replaced around noon, next Phosphorous repl. due at 4pm,  C/O headache, improved with PRN Oxy and Tylenol. Pt scoring green on the Aggression Stop Light Tool. Plan to monitor CSF cultures. Discharge pending.

## 2022-08-21 LAB
ANION GAP SERPL CALCULATED.3IONS-SCNC: 3 MMOL/L (ref 3–14)
BUN SERPL-MCNC: 15 MG/DL (ref 7–30)
CALCIUM SERPL-MCNC: 9 MG/DL (ref 8.5–10.1)
CHLORIDE BLD-SCNC: 115 MMOL/L (ref 94–109)
CO2 SERPL-SCNC: 28 MMOL/L (ref 20–32)
CREAT SERPL-MCNC: 0.44 MG/DL (ref 0.52–1.04)
ERYTHROCYTE [DISTWIDTH] IN BLOOD BY AUTOMATED COUNT: 27.4 % (ref 10–15)
GFR SERPL CREATININE-BSD FRML MDRD: >90 ML/MIN/1.73M2
GLUCOSE BLD-MCNC: 128 MG/DL (ref 70–99)
GLUCOSE BLDC GLUCOMTR-MCNC: 103 MG/DL (ref 70–99)
GLUCOSE BLDC GLUCOMTR-MCNC: 106 MG/DL (ref 70–99)
GLUCOSE BLDC GLUCOMTR-MCNC: 122 MG/DL (ref 70–99)
GLUCOSE BLDC GLUCOMTR-MCNC: 129 MG/DL (ref 70–99)
GLUCOSE BLDC GLUCOMTR-MCNC: 131 MG/DL (ref 70–99)
GLUCOSE BLDC GLUCOMTR-MCNC: 149 MG/DL (ref 70–99)
HCT VFR BLD AUTO: 33.6 % (ref 35–47)
HGB BLD-MCNC: 9.4 G/DL (ref 11.7–15.7)
MCH RBC QN AUTO: 24 PG (ref 26.5–33)
MCHC RBC AUTO-ENTMCNC: 28 G/DL (ref 31.5–36.5)
MCV RBC AUTO: 86 FL (ref 78–100)
PHOSPHATE SERPL-MCNC: 2.1 MG/DL (ref 2.5–4.5)
PHOSPHATE SERPL-MCNC: 2.1 MG/DL (ref 2.5–4.5)
PLATELET # BLD AUTO: 638 10E3/UL (ref 150–450)
POTASSIUM BLD-SCNC: 3.6 MMOL/L (ref 3.4–5.3)
RBC # BLD AUTO: 3.92 10E6/UL (ref 3.8–5.2)
SODIUM SERPL-SCNC: 146 MMOL/L (ref 133–144)
VANCOMYCIN SERPL-MCNC: 13.6 MG/L
WBC # BLD AUTO: 6.6 10E3/UL (ref 4–11)

## 2022-08-21 PROCEDURE — 250N000011 HC RX IP 250 OP 636: Performed by: HOSPITALIST

## 2022-08-21 PROCEDURE — 250N000013 HC RX MED GY IP 250 OP 250 PS 637: Performed by: HOSPITALIST

## 2022-08-21 PROCEDURE — 84100 ASSAY OF PHOSPHORUS: CPT | Performed by: HOSPITALIST

## 2022-08-21 PROCEDURE — 250N000011 HC RX IP 250 OP 636: Performed by: INTERNAL MEDICINE

## 2022-08-21 PROCEDURE — 250N000013 HC RX MED GY IP 250 OP 250 PS 637: Performed by: INTERNAL MEDICINE

## 2022-08-21 PROCEDURE — 80048 BASIC METABOLIC PNL TOTAL CA: CPT | Performed by: INTERNAL MEDICINE

## 2022-08-21 PROCEDURE — 80202 ASSAY OF VANCOMYCIN: CPT

## 2022-08-21 PROCEDURE — 84100 ASSAY OF PHOSPHORUS: CPT | Performed by: INTERNAL MEDICINE

## 2022-08-21 PROCEDURE — 99233 SBSQ HOSP IP/OBS HIGH 50: CPT | Performed by: HOSPITALIST

## 2022-08-21 PROCEDURE — 85014 HEMATOCRIT: CPT | Performed by: INTERNAL MEDICINE

## 2022-08-21 PROCEDURE — 999N000190 HC STATISTIC VAT ROUNDS

## 2022-08-21 PROCEDURE — 120N000001 HC R&B MED SURG/OB

## 2022-08-21 PROCEDURE — 258N000003 HC RX IP 258 OP 636: Performed by: INTERNAL MEDICINE

## 2022-08-21 PROCEDURE — 258N000003 HC RX IP 258 OP 636: Performed by: HOSPITALIST

## 2022-08-21 RX ADMIN — VANCOMYCIN HYDROCHLORIDE 1500 MG: 5 INJECTION, POWDER, LYOPHILIZED, FOR SOLUTION INTRAVENOUS at 10:51

## 2022-08-21 RX ADMIN — ACETAMINOPHEN 650 MG: 325 SUSPENSION ORAL at 16:06

## 2022-08-21 RX ADMIN — MEROPENEM 2 G: 1 INJECTION, POWDER, FOR SOLUTION INTRAVENOUS at 04:56

## 2022-08-21 RX ADMIN — Medication 15 ML: at 10:19

## 2022-08-21 RX ADMIN — LEVETIRACETAM 1000 MG: 10 INJECTION INTRAVENOUS at 22:07

## 2022-08-21 RX ADMIN — MICONAZOLE NITRATE: 20 CREAM TOPICAL at 10:57

## 2022-08-21 RX ADMIN — TOPIRAMATE 50 MG: 50 TABLET ORAL at 22:11

## 2022-08-21 RX ADMIN — Medication 1 PACKET: at 10:20

## 2022-08-21 RX ADMIN — OXCARBAZEPINE 450 MG: 300 SUSPENSION ORAL at 22:11

## 2022-08-21 RX ADMIN — Medication 1 PACKET: at 12:58

## 2022-08-21 RX ADMIN — INSULIN ASPART 1 UNITS: 100 INJECTION, SOLUTION INTRAVENOUS; SUBCUTANEOUS at 04:54

## 2022-08-21 RX ADMIN — VANCOMYCIN HYDROCHLORIDE 125 MG: KIT at 22:11

## 2022-08-21 RX ADMIN — VANCOMYCIN HYDROCHLORIDE 125 MG: KIT at 17:43

## 2022-08-21 RX ADMIN — MEROPENEM 2 G: 1 INJECTION, POWDER, FOR SOLUTION INTRAVENOUS at 20:20

## 2022-08-21 RX ADMIN — MICONAZOLE NITRATE: 20 CREAM TOPICAL at 20:20

## 2022-08-21 RX ADMIN — VANCOMYCIN HYDROCHLORIDE 125 MG: KIT at 12:58

## 2022-08-21 RX ADMIN — Medication 1 PACKET: at 17:41

## 2022-08-21 RX ADMIN — LEVETIRACETAM 1000 MG: 10 INJECTION INTRAVENOUS at 10:12

## 2022-08-21 RX ADMIN — VANCOMYCIN HYDROCHLORIDE 125 MG: KIT at 10:19

## 2022-08-21 RX ADMIN — POTASSIUM & SODIUM PHOSPHATES POWDER PACK 280-160-250 MG 1 PACKET: 280-160-250 PACK at 05:14

## 2022-08-21 RX ADMIN — Medication 40 MG: at 10:19

## 2022-08-21 RX ADMIN — POTASSIUM & SODIUM PHOSPHATES POWDER PACK 280-160-250 MG 1 PACKET: 280-160-250 PACK at 02:26

## 2022-08-21 RX ADMIN — MEROPENEM 2 G: 1 INJECTION, POWDER, FOR SOLUTION INTRAVENOUS at 12:58

## 2022-08-21 RX ADMIN — POTASSIUM & SODIUM PHOSPHATES POWDER PACK 280-160-250 MG 1 PACKET: 280-160-250 PACK at 10:20

## 2022-08-21 RX ADMIN — ACETAMINOPHEN 650 MG: 325 SUSPENSION ORAL at 02:26

## 2022-08-21 ASSESSMENT — ACTIVITIES OF DAILY LIVING (ADL)
ADLS_ACUITY_SCORE: 38
ADLS_ACUITY_SCORE: 40
ADLS_ACUITY_SCORE: 40
ADLS_ACUITY_SCORE: 38
ADLS_ACUITY_SCORE: 40
ADLS_ACUITY_SCORE: 42
ADLS_ACUITY_SCORE: 38
ADLS_ACUITY_SCORE: 38

## 2022-08-21 NOTE — PROGRESS NOTES
[facility]    Medicine Progress Note - Hospitalist Service    Date of Admission:  7/27/2022    Assessment & Plan          Summary of Stay:  Julisa Chaney is a 77-year-old woman with medical history which includes trigeminal neuralgia who was recently admitted to [facility] on 7/19 for severe sepsis(had leukocytosis, lactic acidosis and elevated procalcitonin) due to COVID-19 infection and suspected bacterial pneumonia. Recent COVID-19 infection which was diagnosed on 7/19/2022 and was treated with 5 days of remdesivir and did not require any steroids and she was treated with IV vancomycin and IV meropenem.     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  She was switched to IV vancomycin, cefepime and metronidazole and transferred to [facility] for neurosurgery assessment. MRI brain w/wo contrast 7/30 showed ventriculitis with probable abscess. Neurosurgery/neurology/infectious disease/oncology consulted. Patient taken to the OR on 7/31 for left frontal ventriculostomy.   Since then has remained on broad spectrum antibiotics.  In addition she has developed C. Difficile colitis and is on treatment.  She has continued to have a significant encephalopathy.    Severe sepsis secondary to ventriculitis with abscess  Status left frontal ventriculostomy 7/31/22:  -Appreciate neurosurgery, oncology, infectious disease service assistance.  -Neurosurgery and oncology were consulted and flow cytometry was done which did not show any evidence of malignancy.  -Patient underwent left frontal ventriculostomy on 7/31/2022. Post-op management per neurosurgery team.  -CSF sent on 8/5 which revealed , , glucose 98 and protein 84.  WBC this AM 25, . CSF 2cc for repeat labs. CSF with sediment. EVD was clamped on 8/6 and removed on 8/8.  -Was on IV vancomycin, cefepime, and metronidazole. ID planning 6 weeks of treatment with current regimen - unable to de-escalate as cultures have been negative.  Daughter concerned about the cefepime. Patient apparently had similar symptoms of lethargy and confusion when on rocephin in February 2022 which improved after rocephin was discontinued. Unclear if rocephin was the etiology at that time, symptoms may have been due to her illness. Daughter would like to try a different antibiotic if possible. Following this ID stopped cefepime and metronidazole and started meropenem.  Also remained on Vancomycin.  8/16: s/p IR G tube placement  8/17:CT reviewed  8/18: S/p lumbar puncture with opening pressure of 12 cmH2O   - Continue meropenem, vancomycin IV.   - Follow cultures.  - Pain meds prn.  - Further plan per ID/neurosurgery.      Acute infectious encephalopathy due to above:  Appeared to become more alert but has not really made progress beyond that now for a few days.  She continues to rarely speak and doesn't consistently follow commands.    -Continue to treat underlying issues as noted above.    -Maintain normal sleep/wake cycle as able.  -Avoid opioid pain medications as able.    C. difficile colitis:  Patient was noted to have copious diarrhea on 8/1 and sample came back positive for C. Difficile.  -Continue oral vancomycin.  Will need a prolonged treatment given other abx use.  -Rectal tube as needed.  -ID following as noted above.     Acute on chronic anemia:  Prior labs show baseline hemoglobin of about 9-10. Hemoglobin on admission was 11.7. Has slowly trended down, now low enough to recommend PRBC transfusion on 8/8/22. No obvious ongoing bleeding noted. WBC and platelets - normal-elevated. Iron panel on 7/30/22 showed that her total iron was low at 14, binding capacity was low at 28, iron  saturation was 6 and B12 was high and folate was normal.  Repeat hemoglobin 8/8 lower at 6.4, then 6.9 on recheck. Transfused one unit PRBCs on 8/8/22.  She is iron deficient, received IV iron infusion x3 with first on 8/11/22.  -Continue to monitor, hgb stable above 8.      Sinus tachycardia:  -  improved. 90 this am.Continue to monitor.     History of trigeminal neuralgia  -Continue PTA Trileptal and Topamax.     History of chronic pyloric ulcer  She had EGD done in 2020 which showed gastroduodenitis with a duodenal stricture.  -Continue PPI.     Hyperglycemia  Recent HbA1c was 5.6 on 7/19/22. Was not on any medication prior to admission.  -Continue medium dose sliding scale insulin every 4 hours.    Recent Labs   Lab 08/21/22  0514 08/21/22  0452 08/21/22  0016 08/20/22  1954 08/20/22  1613 08/20/22  1201   * 149* 131* 142* 113* 100*     -Lantus to 10 units  - Adjust regimen as needed.    Intermittent Hypokalemia, Hyponatremia (now hyper), Hypophosphatemia:  -Recheck and replace per protocol.  -Continue to follow trend.     Hypernatremia: 149--149--146 this am.  08/20; increased free water flushes to 200 ml  every 4 hrs    Severe protein calorie malnutrition:  -Dietitian consulted.  Currently getting tube feedings and free water flushes via NG feeding tube.  -Will need SLP consult when more able to participate.  If not making more neuro progress by Monday/Tuesday consider as part of goals of care discussions longer term FT placement.     Physical deconditioning:  -Patient seems severely deconditioned.  -PT/OT as able, not really able to substantially participate at this point due to her mental status.        Diet: TF  DVT Prophylaxis: Pneumatic Compression Devices  Abrams Catheter: Not present  Central Lines: PRESENT  PICC Triple Lumen 07/22/22 Right Basilic Vascular access-Site Assessment: WDL  Cardiac Monitoring: None  Code Status: Full Code      Disposition Plan }  Expected Discharge Date: TBD.  stacey Cristobal MD  Hospitalist Service  [facility]    ______________________________________________________________________    Interval History   No saying much this am. does not seen in pain.      Physical Exam   /70   Pulse 90   Temp 98.5  F (36.9  C) (Axillary)   Resp 16   Wt 51.8 kg (114 lb 4.8 oz)   SpO2 97%   BMI 19.02 kg/m    HEENT-  head incision C/d/i further as per neurosurgery  CVS- I+II+ no m/r/g  RS- CTAB  Abdo- soft, no tenderness. No g/r/r  Ext- no edema   CNS: She knows she is in the hospital.  Obeying instructions    Data    BMP  Recent Labs   Lab 08/21/22  0514 08/21/22  0452 08/21/22  0016 08/20/22  1954 08/20/22  1706 08/20/22  0859 08/20/22  0542 08/19/22  0754 08/19/22  0559 08/18/22  0819 08/18/22  0601   *  --   --   --   --   --  149*  --  149*  --  149*   POTASSIUM 3.6  --   --   --  4.4  --  3.4  --  3.7   < > 2.7*   CHLORIDE 115*  --   --   --   --   --  117*  --  119*  --  114*   ADY 9.0  --   --   --   --   --  9.0  --  8.5  --  7.9*   CO2 28  --   --   --   --   --  30  --  27  --  29   BUN 15  --   --   --   --   --  17  --  16  --  13   CR 0.44*  --   --   --   --   --  0.48*  --  0.42*  --  0.43*   * 149* 131* 142*  --    < > 96   < > 173*   < > 134*    < > = values in this interval not displayed.     CBC  Recent Labs   Lab 08/21/22  0514 08/20/22  0542 08/19/22  0559 08/18/22  0601   WBC 6.6 7.7 11.6* 21.1*   RBC 3.92 3.90 3.89 4.13   HGB 9.4* 9.3* 9.2* 9.7*   HCT 33.6* 32.9* 33.3* 34.6*   MCV 86 84 86 84   MCH 24.0* 23.8* 23.7* 23.5*   MCHC 28.0* 28.3* 27.6* 28.0*   RDW 27.4* 28.0* 28.3* 27.6*   * 660* 663* 746*     INRNo lab results found in last 7 days.  LFTs  No lab results found in last 7 days.   PANCNo lab results found in last 7 days.    No results found for this or any previous visit (from the past 24 hour(s)).

## 2022-08-21 NOTE — PROGRESS NOTES
[facility]     Neurosurgery Progress Note     Date of Service (when I saw the patient): 08/21/2022        Assessment & Plan        Procedure(s):  LEFT FRONTAL VENTRICULOSTOMY   -20 Days Post-Op  13 Days Post-Op removal of EVD  Today she was seen lying in bed. She appears comfortable. She does not answer questions today.  Eyes open, but does not follow commands, does localize to pain.  No growth on recent CSF cultures.     Plan:  -Continue to follow CSF cultures  -Appreciate assistance from other services  -Ventriculostomy stitch should be removed tomorrow (14 days)    Discussed with Dr. Alatorre.    Shazia Caban MPAS  [facility] Neurosurgery  [facility]  [address] [place] [address]  [address] [place] [id number]    Tel [phone]  Pager [phone]

## 2022-08-21 NOTE — PLAN OF CARE
Goal Outcome Evaluation:      Pt here with Cerebral abscess, ventriculitis, hydrocephalus. Lethargic, arouses to voice, not much verbal, weak. VSS. Tele NS with PVCs. NOP diet, PEG running at 50 mL, . Takes pills crushed through PEG. Up with lift, repo Q2hrs. Denies pain. Pt scoring green on the Aggression Stop Light Tool. Discharge pending.

## 2022-08-21 NOTE — PHARMACY-VANCOMYCIN DOSING SERVICE
Pharmacy Vancomycin Note  Date of Service 2022  Patient's  1945   77 year old, female    Indication: Meningitis  Day of Therapy: 32  Current vancomycin regimen:  1250 mg IV q24h  Current vancomycin monitoring method: Trough (Method 2 = manual dose calculation)  Current vancomycin therapeutic monitoring goal: 15-20 mg/L    InsightRX Prediction of Current Vancomycin Regimen  Loading dose: N/A  Regimen: 1250 mg IV every 24 hours.  Start time: 09:30 on 2022  Exposure target: [id number] mg/L   AUC24,ss: 476 mg/L.hr  Probability of AUC24 > 400: 93 %  Ctrough,ss: 13.3 mg/L  Probability of Ctrough,ss > 20: 0 %  Probability of nephrotoxicity (Lodise ESTIVEN ): 8 %    Current estimated CrCl = Estimated Creatinine Clearance: 87.6 mL/min (A) (based on SCr of 0.44 mg/dL (L)).    Creatinine for last 3 days  2022:  5:59 AM Creatinine 0.42 mg/dL  2022:  5:42 AM Creatinine 0.48 mg/dL  2022:  5:14 AM Creatinine 0.44 mg/dL    Recent Vancomycin Levels (past 3 days)  2022:  5:59 AM Vancomycin 15.3 mg/L  2022:  8:49 AM Vancomycin 13.6 mg/L    Vancomycin IV Administrations (past 72 hours)                   vancomycin 1250 mg in 0.9% NaCl 250 mL intermittent infusion 1,250 mg (mg) 1,250 mg New Bag 22 0929     1,250 mg New Bag 22 0942     1,250 mg New Bag 22 1032                Nephrotoxins and other renal medications (From now, onward)    Start     Dose/Rate Route Frequency Ordered Stop    22 1000  vancomycin 1500 mg in 0.9% NaCl 250 ml intermittent infusion 1,500 mg         1,500 mg  over 120 Minutes Intravenous EVERY 24 HOURS 22 0959      08/15/22 0000  vancomycin        Note to Pharmacy: Check twice weekly creatinine and vancomycin levels. Dosing per Pharm D based on AUC       08/15/22 1505 22 2359    22 0900  vancomycin (FIRVANQ) oral solution 125 mg         125 mg Oral or Feeding Tube 4 TIMES DAILY 22 0819               Contrast Orders  - past 72 hours (72h ago, onward)    None          Interpretation of levels and current regimen:  Vancomycin level is reflective of subtherapeutic level    Has serum creatinine changed greater than 50% in last 72 hours: No    Urine output:  unable to determine    Renal Function: Stable    InsightRX Prediction of Planned New Vancomycin Regimen  Loading dose: N/A  Regimen: 1500 mg IV every 24 hours.  Start time: 09:30 on 08/21/2022  Exposure target: [id number] mg/L   AUC24,ss: 564 mg/L.hr  Probability of AUC24 > 400: 100 %  Ctrough,ss: 15.8 mg/L  Probability of Ctrough,ss > 20: 1 %  Probability of nephrotoxicity (Lodise ESTIVEN 2009): 11 %      Plan:  1. Increase Dose to 1500 mg IV q24hr  2. Vancomycin monitoring method: Trough (Method 2 = manual dose calculation)  3. Vancomycin therapeutic monitoring goal: 15-20 mg/L  4. Pharmacy will check vancomycin levels as appropriate in 1-3 Days.  5. Serum creatinine levels will be ordered a minimum of twice weekly.    Angelita Wasserman [facility]

## 2022-08-21 NOTE — PLAN OF CARE
"[id number] note. Pt admitted with hydrocephalus, vetriculitis. On enteric and contact precautions. Continues to be lethargic, arouses to voice. Following fewer directions during assessment. Oriented to self only. VSS on RA. C/o headache 10/10 earlier today, reported \"a little better\" after prn tylenol and oxycodone. Sleeping between cares. Inconsistent, slow following commands. Generalized weakness - UEs 3-4/5, LEs 3/5. JANN. Lung sounds diminished. PEG site WDL. Tube feed infusing at goal rate. Flushes increased to 200 ml q 4 hrs today. Incontinent of bowel and bladder. Rectal tube intact (small amt of stool leaking around tube). Reposition q 2 hrs. Transfers with lift.                "

## 2022-08-21 NOTE — PLAN OF CARE
VSS. Oriented to self only. Lethargic, arouses to voice. Inconsistent with following directions. PRN Tylenol and Oxycodone for pain/discomfort. Turned and repositioned every 2 hours. TF at goal rate.   Incontinent, rectal tube in place. Continues with IV antibiotics. Will continue to monitor.    /70   Pulse 90   Temp 98.5  F (36.9  C) (Axillary)   Resp 16   Wt 51.8 kg (114 lb 4.8 oz)   SpO2 97%   BMI 19.02 kg/m

## 2022-08-22 ENCOUNTER — APPOINTMENT (OUTPATIENT)
Dept: OCCUPATIONAL THERAPY | Facility: CLINIC | Age: 77
DRG: 023 | End: 2022-08-22
Attending: INTERNAL MEDICINE
Payer: COMMERCIAL

## 2022-08-22 LAB
ANION GAP SERPL CALCULATED.3IONS-SCNC: 5 MMOL/L (ref 3–14)
BUN SERPL-MCNC: 13 MG/DL (ref 7–30)
CALCIUM SERPL-MCNC: 8.5 MG/DL (ref 8.5–10.1)
CHLORIDE BLD-SCNC: 106 MMOL/L (ref 94–109)
CO2 SERPL-SCNC: 26 MMOL/L (ref 20–32)
CREAT SERPL-MCNC: 0.43 MG/DL (ref 0.52–1.04)
GFR SERPL CREATININE-BSD FRML MDRD: >90 ML/MIN/1.73M2
GLUCOSE BLD-MCNC: 118 MG/DL (ref 70–99)
GLUCOSE BLDC GLUCOMTR-MCNC: 113 MG/DL (ref 70–99)
GLUCOSE BLDC GLUCOMTR-MCNC: 115 MG/DL (ref 70–99)
GLUCOSE BLDC GLUCOMTR-MCNC: 116 MG/DL (ref 70–99)
GLUCOSE BLDC GLUCOMTR-MCNC: 121 MG/DL (ref 70–99)
GLUCOSE BLDC GLUCOMTR-MCNC: 133 MG/DL (ref 70–99)
GLUCOSE BLDC GLUCOMTR-MCNC: 134 MG/DL (ref 70–99)
GLUCOSE BLDC GLUCOMTR-MCNC: 151 MG/DL (ref 70–99)
MAGNESIUM SERPL-MCNC: 2.1 MG/DL (ref 1.6–2.3)
PHOSPHATE SERPL-MCNC: 2 MG/DL (ref 2.5–4.5)
POTASSIUM BLD-SCNC: 3.4 MMOL/L (ref 3.4–5.3)
POTASSIUM BLD-SCNC: 3.9 MMOL/L (ref 3.4–5.3)
SODIUM SERPL-SCNC: 137 MMOL/L (ref 133–144)

## 2022-08-22 PROCEDURE — 250N000009 HC RX 250

## 2022-08-22 PROCEDURE — 250N000011 HC RX IP 250 OP 636: Performed by: HOSPITALIST

## 2022-08-22 PROCEDURE — 250N000011 HC RX IP 250 OP 636: Performed by: NURSE PRACTITIONER

## 2022-08-22 PROCEDURE — 80048 BASIC METABOLIC PNL TOTAL CA: CPT | Performed by: HOSPITALIST

## 2022-08-22 PROCEDURE — 250N000011 HC RX IP 250 OP 636: Performed by: INTERNAL MEDICINE

## 2022-08-22 PROCEDURE — 999N000190 HC STATISTIC VAT ROUNDS

## 2022-08-22 PROCEDURE — 250N000013 HC RX MED GY IP 250 OP 250 PS 637: Performed by: INTERNAL MEDICINE

## 2022-08-22 PROCEDURE — 97535 SELF CARE MNGMENT TRAINING: CPT | Mod: GO

## 2022-08-22 PROCEDURE — 250N000013 HC RX MED GY IP 250 OP 250 PS 637: Performed by: HOSPITALIST

## 2022-08-22 PROCEDURE — 99233 SBSQ HOSP IP/OBS HIGH 50: CPT | Performed by: HOSPITALIST

## 2022-08-22 PROCEDURE — 258N000003 HC RX IP 258 OP 636: Performed by: HOSPITALIST

## 2022-08-22 PROCEDURE — 120N000001 HC R&B MED SURG/OB

## 2022-08-22 PROCEDURE — 84132 ASSAY OF SERUM POTASSIUM: CPT | Performed by: HOSPITALIST

## 2022-08-22 PROCEDURE — 83735 ASSAY OF MAGNESIUM: CPT | Performed by: HOSPITALIST

## 2022-08-22 PROCEDURE — 97110 THERAPEUTIC EXERCISES: CPT | Mod: GO

## 2022-08-22 PROCEDURE — 84100 ASSAY OF PHOSPHORUS: CPT | Performed by: HOSPITALIST

## 2022-08-22 PROCEDURE — 99232 SBSQ HOSP IP/OBS MODERATE 35: CPT | Performed by: SPECIALIST

## 2022-08-22 PROCEDURE — 258N000003 HC RX IP 258 OP 636: Performed by: INTERNAL MEDICINE

## 2022-08-22 RX ORDER — POTASSIUM CHLORIDE 1.5 G/1.58G
40 POWDER, FOR SOLUTION ORAL ONCE
Status: COMPLETED | OUTPATIENT
Start: 2022-08-22 | End: 2022-08-22

## 2022-08-22 RX ORDER — WATER 10 ML/10ML
INJECTION INTRAMUSCULAR; INTRAVENOUS; SUBCUTANEOUS
Status: COMPLETED
Start: 2022-08-22 | End: 2022-08-22

## 2022-08-22 RX ORDER — LEVETIRACETAM 100 MG/ML
1000 SOLUTION ORAL EVERY 12 HOURS
Status: DISCONTINUED | OUTPATIENT
Start: 2022-08-22 | End: 2022-09-30

## 2022-08-22 RX ADMIN — VANCOMYCIN HYDROCHLORIDE 125 MG: KIT at 21:39

## 2022-08-22 RX ADMIN — OXCARBAZEPINE 450 MG: 300 SUSPENSION ORAL at 21:40

## 2022-08-22 RX ADMIN — MICONAZOLE NITRATE: 20 CREAM TOPICAL at 10:24

## 2022-08-22 RX ADMIN — MORPHINE SULFATE 2 MG: 4 INJECTION, SOLUTION INTRAMUSCULAR; INTRAVENOUS at 18:53

## 2022-08-22 RX ADMIN — VANCOMYCIN HYDROCHLORIDE 125 MG: KIT at 09:41

## 2022-08-22 RX ADMIN — MEROPENEM 2 G: 1 INJECTION, POWDER, FOR SOLUTION INTRAVENOUS at 04:36

## 2022-08-22 RX ADMIN — Medication 1 PACKET: at 09:51

## 2022-08-22 RX ADMIN — POTASSIUM & SODIUM PHOSPHATES POWDER PACK 280-160-250 MG 1 PACKET: 280-160-250 PACK at 09:51

## 2022-08-22 RX ADMIN — POTASSIUM & SODIUM PHOSPHATES POWDER PACK 280-160-250 MG 1 PACKET: 280-160-250 PACK at 13:17

## 2022-08-22 RX ADMIN — OXYCODONE HYDROCHLORIDE 2.5 MG: 5 TABLET ORAL at 17:32

## 2022-08-22 RX ADMIN — POTASSIUM & SODIUM PHOSPHATES POWDER PACK 280-160-250 MG 1 PACKET: 280-160-250 PACK at 18:45

## 2022-08-22 RX ADMIN — ACETAMINOPHEN 650 MG: 325 SUSPENSION ORAL at 21:40

## 2022-08-22 RX ADMIN — VANCOMYCIN HYDROCHLORIDE 125 MG: KIT at 18:45

## 2022-08-22 RX ADMIN — OXYCODONE HYDROCHLORIDE 2.5 MG: 5 TABLET ORAL at 21:40

## 2022-08-22 RX ADMIN — TOPIRAMATE 50 MG: 50 TABLET ORAL at 21:40

## 2022-08-22 RX ADMIN — WATER 10 ML: 1 INJECTION INTRAMUSCULAR; INTRAVENOUS; SUBCUTANEOUS at 23:49

## 2022-08-22 RX ADMIN — Medication 15 ML: at 09:41

## 2022-08-22 RX ADMIN — VANCOMYCIN HYDROCHLORIDE 1500 MG: 5 INJECTION, POWDER, LYOPHILIZED, FOR SOLUTION INTRAVENOUS at 10:05

## 2022-08-22 RX ADMIN — VANCOMYCIN HYDROCHLORIDE 125 MG: KIT at 13:17

## 2022-08-22 RX ADMIN — LEVETIRACETAM 1000 MG: 10 INJECTION INTRAVENOUS at 09:14

## 2022-08-22 RX ADMIN — MEROPENEM 2 G: 1 INJECTION, POWDER, FOR SOLUTION INTRAVENOUS at 20:31

## 2022-08-22 RX ADMIN — Medication 1 PACKET: at 13:18

## 2022-08-22 RX ADMIN — ACETAMINOPHEN 650 MG: 325 SUSPENSION ORAL at 17:32

## 2022-08-22 RX ADMIN — MICONAZOLE NITRATE: 20 CREAM TOPICAL at 21:40

## 2022-08-22 RX ADMIN — POTASSIUM CHLORIDE 40 MEQ: 1.5 POWDER, FOR SOLUTION ORAL at 09:51

## 2022-08-22 RX ADMIN — MEROPENEM 2 G: 1 INJECTION, POWDER, FOR SOLUTION INTRAVENOUS at 13:18

## 2022-08-22 RX ADMIN — ALTEPLASE 2 MG: 2.2 INJECTION, POWDER, LYOPHILIZED, FOR SOLUTION INTRAVENOUS at 23:48

## 2022-08-22 RX ADMIN — LEVETIRACETAM 1000 MG: 100 SOLUTION ORAL at 20:33

## 2022-08-22 RX ADMIN — Medication 40 MG: at 09:41

## 2022-08-22 RX ADMIN — Medication 1 PACKET: at 18:45

## 2022-08-22 ASSESSMENT — ACTIVITIES OF DAILY LIVING (ADL)
ADLS_ACUITY_SCORE: 42
ADLS_ACUITY_SCORE: 38
ADLS_ACUITY_SCORE: 42
ADLS_ACUITY_SCORE: 42
ADLS_ACUITY_SCORE: 38
ADLS_ACUITY_SCORE: 42
ADLS_ACUITY_SCORE: 38
ADLS_ACUITY_SCORE: 42
ADLS_ACUITY_SCORE: 42

## 2022-08-22 NOTE — PROGRESS NOTES
Appears to be sleeping with condition unchanged. Sitter remains in direct observation of pt.    [facility]    Neurosurgery Progress Note    Date of Service (when I saw the patient): 08/22/2022     Assessment & Plan     Procedure(s):  LEFT FRONTAL VENTRICULOSTOMY   -22 Days Post-Op  14 Days Post-Op removal of EVD  Today she was seen lying in bed. She appears comfortable. She is alert and follows some commands. She is able to move upper extremities to command and BLE independently. No growth on recent CSF cultures.    Plan:  -Continue to follow CSF cultures  -Ok for drain site suture removal today if not already done  -Appreciate assistance from other services     Hafsa Layton CNP  [facility] Neurosurgery  [facility]  [address]  [address]  [place] [address]    Tel [phone]  Pager [phone]      Interval History   Stable.    Physical Exam   Temp: 99.1  F (37.3  C) Temp src: Axillary BP: 130/75 Pulse: 91   Resp: 14 SpO2: 98 % O2 Device: None (Room air)    Vitals:    08/14/22 0500 08/15/22 0400 08/22/22 0933   Weight: 122 lb 12.7 oz (55.7 kg) 114 lb 4.8 oz (51.8 kg) 123 lb 7.3 oz (56 kg)     Vital Signs with Ranges  Temp:  [96.9  F (36.1  C)-99.4  F (37.4  C)] 99.1  F (37.3  C)  Pulse:  [] 91  Resp:  [14-18] 14  BP: (111-130)/(59-79) 130/75  SpO2:  [96 %-98 %] 98 %  I/O last 3 completed shifts:  In: 2688 [I.V.:300; NG/GT:1360]  Out: 1300 [Urine:1300]     , Blood pressure 130/75, pulse 91, temperature 99.1  F (37.3  C), temperature source Axillary, resp. rate 14, weight 123 lb 7.3 oz (56 kg), SpO2 98 %, not currently breastfeeding.  123 lbs 7.32 oz  HEENT:  Normocephalic.  PERRLA.    Heart:  No peripheral edema  Lungs:  No SOB  Skin:  Warm and dry, good capillary refill. Incision CDI.  Extremities:  Good radial and dorsalis pedis pulses bilaterally, no edema, cyanosis or clubbing.    NEUROLOGICAL EXAMINATION:   Mental status:  Alert and follows commands  Motor:  LAINEZ    Medications     - MEDICATION INSTRUCTIONS -         banatrol  plus  1 packet Per Feeding Tube TID w/meals     insulin aspart  1-6 Units Subcutaneous Q4H     insulin glargine  10 Units Subcutaneous At Bedtime     levETIRAcetam  1,000 mg Oral or Feeding Tube Q12H     meropenem  2 g Intravenous Q8H     miconazole with skin protectant   Topical BID     multivitamins w/minerals  15 mL Per Feeding Tube Daily     OXcarbazepine  450 mg Oral or Feeding Tube At Bedtime     pantoprazole  40 mg Oral or Feeding Tube QAM AC     potassium & sodium phosphates  1 packet Oral or Feeding Tube Q4H     sodium chloride (PF)  10-40 mL Intracatheter Q7 Days     topiramate  50 mg Oral or Feeding Tube At Bedtime     vancomycin  125 mg Oral or Feeding Tube 4x Daily     vancomycin  1,500 mg Intravenous Q24H       Data     CBC RESULTS:   Recent Labs   Lab Test 08/20/22  0542   WBC 7.7   RBC 3.90   HGB 9.3*   HCT 32.9*   MCV 84   MCH 23.8*   MCHC 28.3*   RDW 28.0*   *     Basic Metabolic Panel:  Lab Results   Component Value Date     08/20/2022      Lab Results   Component Value Date    POTASSIUM 3.4 08/20/2022     Lab Results   Component Value Date    CHLORIDE 117 08/20/2022     Lab Results   Component Value Date    ADY 9.0 08/20/2022     Lab Results   Component Value Date    CO2 30 08/20/2022     Lab Results   Component Value Date    BUN 17 08/20/2022     Lab Results   Component Value Date    CR 0.48 08/20/2022     Lab Results   Component Value Date     08/20/2022    GLC 96 08/20/2022     INR:  Lab Results   Component Value Date    INR 1.34 07/29/2022    INR 1.13 07/27/2022    INR 1.36 07/19/2022    INR 1.05 02/07/2022    INR 0.97 02/06/2019

## 2022-08-22 NOTE — PLAN OF CARE
Goal Outcome Evaluation:      Pt here with hydrocephalus, brain abscess. A&O to self sometimes. Neuros lethargic, slurred, generalized weakness. VSS. Tele NS. NPO diet, Takes pills crushed through PEG. Up with lift, repo Q2hrs. Denies pain. Pt scoring green on the Aggression Stop Light Tool. Plan continue monitoring/treatments. Discharge pending.

## 2022-08-22 NOTE — PROGRESS NOTES
"CLINICAL NUTRITION SERVICES - REASSESSMENT NOTE      Recommendations Ordered by Registered Dietitian (RD):     With continued loose stooling via rectal tube (pt with vanco for cdiff and K replacement), can trial a semi-elemental formula to see if stool volume improves:    Type of Feeding Tube: 18 Fr PEG  Enteral Frequency:  Continuous  Enteral Regimen: Vital 1.5 @ 50 mL/hr  Total Enteral Provisions: 1800 cals, 82 gm pro (1.7 gm/kg), 7 gm fiber, 917 mL free water, 224 gm CHO  1 packet Banatrol TID = 120 cals, 6 gm fiber  TOTAL: 1920 cals (40 matias/kg), 13 gm fiber    Free Water Flush: (8/20 - MD order) 200 mL every 4 hrs    Will start new formula at 25 mL/hr.  Increase after 6 hrs to goal rate.       Malnutrition: (8/4)  % Weight Loss:  > 10% in 6 months (severe malnutrition)  % Intake:  Decreased intake does not meet criteria - EN now meeting needs   Subcutaneous Fat Loss:  Orbital region moderate depletion  Muscle Loss:  Temporal region moderate depletion, Clavicle bone region moderate depletion, Acromion bone region moderate-severe depletion and Dorsal hand region severe depletion  Fluid Retention:  None noted     Malnutrition Diagnosis: Severe malnutrition  In Context of:  Acute illness or injury        EVALUATION OF PROGRESS TOWARD GOALS   Diet:    NPO    Nutrition Support:    Type of Feeding Tube: 18 Fr PEG (placed 8/16)  Enteral Frequency:  Continuous  Enteral Regimen: Jevity 1.5 at 50 mL/hr (goal rate)  Total Enteral Provisions: 1800 kcal (37 kcal/kg), 76 g protein (1.6 g/kg), 912 mL H2O, 258 g CHO, 25 g fiber   Free Water Flush: 200 mL every 4 hours (8/20 per MD)     8/15: Banatrol TID = 120 cals, 6 gm pro   T: 1920 (40 cals/kg), 31 gm fiber      Liquid MVI/M daily     Intake/Tolerance:    Chart reviewed    cdiff colitis --> per MD, \"Continue oral vancomycin.  Will need a prolonged treatment given other abx use\"    Pt continues with rectal tube:  8/19: 700 mL  8/20: 1000 mL  8/21: no amt recorded, \"consistent " "output\"    Note that pt continues to receive K replacement in some form - side effect can be diarrhea  8/16: K dose x4  8/18: K dose x2  8/20: K dose x4  8/21: K dose x3    Labs:  8/21: K 3.6  8/22: K 3.4        ASSESSED NUTRITION NEEDS:  Dosing Weight 48.3 kg (7/27 - admit wt)  Estimated Energy Needs: 8758-9761 kcals (35-40 Kcal/Kg)  Justification: repletion and underweight  Estimated Protein Needs: 70-95 grams protein (1.5-2 g pro/Kg)  Justification: repletion       NEW FINDINGS:     08/22/22 0933 56 kg (123 lb 7.3 oz) --   08/15/22 0400 51.8 kg (114 lb 4.8 oz) Bed scale   08/14/22 0500 55.7 kg (122 lb 12.7 oz) Bed scale   08/13/22 0600 59.6 kg (131 lb 6.3 oz) Bed scale   08/12/22 0600 53.8 kg (118 lb 9.7 oz) Bed scale   08/11/22 0600 55 kg (121 lb 4.1 oz) Bed scale   08/10/22 0408 55.2 kg (121 lb 11.1 oz) Bed scale   08/09/22 0400 51.1 kg (112 lb 10.5 oz) Bed scale   08/08/22 0500 54.6 kg (120 lb 5.9 oz) Bed scale   08/06/22 0600 53 kg (116 lb 13.5 oz) --   08/05/22 0500 53.4 kg (117 lb 11.6 oz) Bed scale   08/04/22 0700 53.1 kg (117 lb 1 oz) Bed scale   08/03/22 0500 53.4 kg (117 lb 11.6 oz) Bed scale   08/02/22 0000 53.5 kg (117 lb 15.1 oz) Bed scale   08/01/22 0000 51.5 kg (113 lb 8.6 oz) Bed scale   07/31/22 0648 55.2 kg (121 lb 11.1 oz) Bed scale   07/30/22 0134 53.2 kg (117 lb 4.6 oz) Bed scale   07/28/22 0600 49.5 kg (109 lb 2 oz) Bed scale   07/28/22 0000 -- Bed scale   07/27/22 2100 48.3 kg (106 lb 7.7 oz) Bed scale         Previous Goals (8/19):   EN to meet % estimated needs  Evaluation: Met    Stool output to be <500 mL/day  Evaluation: Not met    Previous Nutrition Diagnosis (8/19):   No nutrition diagnosis identified at this time  Evaluation: No change          CURRENT NUTRITION DIAGNOSIS  No nutrition diagnosis identified at this time    INTERVENTIONS  Recommendations / Nutrition Prescription  NPO    With continued loose stooling via rectal tube (pt with vanco for cdiff and K replacement), " can trial a semi-elemental formula to see if stool volume improves:    Type of Feeding Tube: 18 Fr PEG  Enteral Frequency:  Continuous  Enteral Regimen: Vital 1.5 @ 50 mL/hr  Total Enteral Provisions: 1800 cals, 82 gm pro (1.7 gm/kg), 7 gm fiber, 917 mL free water, 224 gm CHO  1 packet Banatrol TID = 120 cals, 6 gm fiber  TOTAL: 1920 cals (40 matias/kg), 13 gm fiber    Free Water Flush: (8/20 - MD order) 200 mL every 4 hrs    Will start new formula at 25 mL/hr.  Increase after 6 hrs to goal rate.      Goals  EN to meet % estimated needs  Stooling <500 mL/day      MONITORING AND EVALUATION:  Progress towards goals will be monitored and evaluated per protocol and Practice Guidelines

## 2022-08-22 NOTE — PLAN OF CARE
Shift Summary [id number]    No changes occurring overnight. Vital signs within parameters. Tele showing sinus rhythm. Tube feedings continue to run, and patient appearing to tolerate. Rectal tube output remains consistent. Purewick migrating out of place, and patient have large incontinent urine. No signs of pain noted. Resting between cares.

## 2022-08-22 NOTE — PLAN OF CARE
[id number] note. Pt admitted with hydrocephalus, vetriculitis. On enteric and contact precautions. Continues to be lethargic, arouses briefly to voice, but following very few commands. Oriented to self only. VSS on RA. Moaning during cares - prn tylenol administered. Generalized weakness - UEs 3-4/5, LEs 3/5. JANN. Lung sounds diminished. PEG site WDL. Tube feed infusing at goal rate. Incontinent of bowel and bladder. Rectal tube intact (small amt of stool leaking around tube). Reposition q 2 hrs. Transfers with lift. Attempted to contact family for update  - no answer with listed number.

## 2022-08-22 NOTE — PROGRESS NOTES
[facility]    Infectious Disease Progress Note    Date of Service : 08/22/2022        Assessment:  Patient transferred 7/27 from [facility] for concern for brain abscess (recovered COVID) - MRI suggestive of ventriculitis with left lateral ventricle abscess. Has been on broad spectrum antibiotics, now on Meropenem/Vancomycin, mental status improved, s/p left frontal ventriculostomy  CSF cxs have remained negative. Course complicated by development of C.diff colitis. Now has ongoing headache but CSF cell count has improved and appears more lymphocytic . ? CSF leak     Recommendations  1. Continue current antimicrobial therapy Vancomycin, Meropenem.  6 week treatment planned until 9/8  2. Fall cxs have remained negative. Mental status has improved, remains afebrile , Leukocytosis has resolved, CRP improved and CSF cell count is improving all suggestive of improvement.   3. Unclear cause of headache ? CSF leak  4. MRI can be pursued for further evaluation If patient is agreeable.    Nuzhat Hudson MD    Interval History   Continues to complain of headache, though CSF appears improved, patient has consistently declined MRI scans, Neurosurgery is following    Physical Exam   Temp: 99.1  F (37.3  C) Temp src: Axillary BP: 130/75 Pulse: 91   Resp: 14 SpO2: 98 % O2 Device: None (Room air)    Vitals:    08/13/22 0600 08/14/22 0500 08/15/22 0400   Weight: 59.6 kg (131 lb 6.3 oz) 55.7 kg (122 lb 12.7 oz) 51.8 kg (114 lb 4.8 oz)     Vital Signs with Ranges  Temp:  [96.9  F (36.1  C)-99.4  F (37.4  C)] 99.1  F (37.3  C)  Pulse:  [] 91  Resp:  [14-18] 14  BP: (111-130)/(59-79) 130/75  SpO2:  [96 %-98 %] 98 %    Constitutional:awake, ongoing complaints of headache, responds appropriately, moves all extremities  Lungs: Clear to auscultation bilaterally, no crackles or wheezing  Cardiovascular: Regular rate and rhythm, normal S1 and S2, and no murmur noted  Abdomen: soft, G tube in place  Skin: No  rash    Other:    Medications     - MEDICATION INSTRUCTIONS -         banatrol plus  1 packet Per Feeding Tube TID w/meals     insulin aspart  1-6 Units Subcutaneous Q4H     insulin glargine  10 Units Subcutaneous At Bedtime     levETIRAcetam  1,000 mg Intravenous Q12H     meropenem  2 g Intravenous Q8H     miconazole with skin protectant   Topical BID     multivitamins w/minerals  15 mL Per Feeding Tube Daily     OXcarbazepine  450 mg Oral or Feeding Tube At Bedtime     pantoprazole  40 mg Oral or Feeding Tube QAM AC     sodium chloride (PF)  10-40 mL Intracatheter Q7 Days     topiramate  50 mg Oral or Feeding Tube At Bedtime     vancomycin  125 mg Oral or Feeding Tube 4x Daily     vancomycin  1,500 mg Intravenous Q24H       Data   All microbiology laboratory data reviewed.  Recent Labs   Lab Test 08/21/22  0514 08/20/22  0542 08/19/22  0559   WBC 6.6 7.7 11.6*   HGB 9.4* 9.3* 9.2*   HCT 33.6* 32.9* 33.3*   MCV 86 84 86   * 660* 663*     Recent Labs   Lab Test 08/22/22  0603 08/21/22  0514 08/20/22  0542   CR 0.43* 0.44* 0.48*     Recent Labs   Lab Test 08/08/20  0212   SED 13

## 2022-08-22 NOTE — PROGRESS NOTES
[facility]  Hospitalist Progress Note        Brian Mcneill MD   08/22/2022        Interval History:        - patient very lethargic, did not communicate with me  - rectal tube in place with liquidy stool  - getting tube feeds  - last head CT from 8/19 with stable presumed vasogenic edema in the white matter of the anterior inferior left frontal lobe related to ventriculitis of the left lateral ventricle; stable ventriculomegaly of the left lateral ventricle  - neurosurgery following  - remains afebrile; wbc 21--->6.6; on Meropenem and Vancomycin; ID following, plan for 6 weeks treatment until 9/8/22         Assessment and Plan:        Julisa Chaney is a 77-year-old woman with PMH of trigeminal neuralgia who was recently admitted to [facility] on 7/19 for severe sepsis (had leukocytosis, lactic acidosis and elevated procalcitonin) due to COVID-19 infection and suspected bacterial pneumonia. She was treated with 5 days of remdesivir and did not require any steroids , also treated with IV vancomycin and IV meropenem.     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  She was switched to IV vancomycin, cefepime and metronidazole and transferred to [facility] for neurosurgery assessment. MRI brain w/wo contrast 7/30 showed ventriculitis with probable abscess. Neurosurgery/neurology/infectious disease/oncology consulted. Patient taken to the OR on 7/31 for left frontal ventriculostomy.   Since then has remained on broad spectrum antibiotics.  In addition she has developed C. Difficile colitis and is on treatment.  She has continued to have a significant encephalopathy.     Severe sepsis secondary to ventriculitis with left lateral  ventricle abscess  S/p left frontal ventriculostomy 7/31/22:  - Appreciate neurosurgery, oncology, infectious disease service assistance.  - Neurosurgery and oncology were consulted and flow cytometry was done which did not show any evidence of malignancy.  - s/p Left frontal ventriculostomy on 7/31/2022, EVD removed 8/8; CSF cultures have remained negative; CSF counts decreasing 469---233 ---56 (last CSF from 8/18)  - last head CT from 8/19 with stable presumed vasogenic edema in the white matter of the anterior inferior left frontal lobe related to ventriculitis of the left lateral ventricle; stable ventriculomegaly of the left lateral ventricle  - neurosurgery following  - remains afebrile; wbc 21--->6.6; was initially on vancomycin, cefepime, and metronidazole--then Meropenem and Vancomycin; ID following, plan for 6 weeks treatment until 9/8/22     Acute infectious encephalopathy due to above:  - mentation apparently improving but remains pretty lethargic/obtunded; per charts she continues to rarely speak and doesn't consistently follow commands.    -Continue to treat underlying issues as noted above.    -Maintain normal sleep/wake cycle as able.  -Avoid opioid pain medications as able.     C. difficile colitis:  Patient was noted to have copious diarrhea on 8/1 and sample came back positive for C. Difficile.  -Continue oral vancomycin.  Will need a prolonged treatment given other abx use.  - continue Rectal tube   -ID following as noted above.     Acute on chronic anemia:  - Prior labs show baseline hemoglobin of about 9-10.   - Hb on admission was 11.7, slowly trended down, got 1 unit PRBC on 8/8/22 for Hb 6.9  - No obvious ongoing bleeding noted; Hb hs now remained stable around 9 to 9.5  - iron panel on 7/30/22 showed that her total iron was low at 14, binding capacity was low at 28, iron saturation was 6 and B12 was high and folate was normal  - received IV iron infusion x3 with first on 8/11/22.  -Continue  to monitor hgb      History of trigeminal neuralgia  - Continue PTA Trileptal , Keppra and Topamax.     History of chronic pyloric ulcer  She had EGD done in 2020 which showed gastroduodenitis with a duodenal stricture.  - Continue PPI.     Hyperglycemia  Recent HbA1c was 5.6 on 7/19/22. Was not on any medication prior to admission.  - Continue medium dose sliding scale insulin every 4 hours while on tube feeds  - started on Lantus 10 units bedtime, continue .      Intermittent Hypokalemia, Hypo/hypernatremia, Hypophosphatemia:  - Recheck and replace per protocol.  - Continue to follow trend  - hypernatremia improved; continue free water flushes 200 ml  every 4 hrs     Severe protein calorie malnutrition:  - Nutrition following for tube feeds via G tube (placed 8/16) ; getting free water flushes  - Will need SLP consult when more able to participate     Physical deconditioning:  - will need TCU/ LTC placement; currently unable to participate with therapy     Diet: NPO for Medical/Clinical Reasons Except for: Other; Specify: NPO For oral intake and gastric feedings for 6 hours post insertion Gastrostomy G/GJ tube. May feed through Jejunal or Distal PORT immediately for GJ tubes ONLY.  Adult Formula Drip Feeding: Continuous Vital 1.5; Gastrostomy; Goal Rate: 50; mL/hr; Medication - Feeding Tube Flush Frequency: At least 15-30 mL water before and after medication administration and with tube clogging; Amount to Send (Nutrition us...      DVT Prophylaxis: Pneumatic Compression Devices    Central Lines: PRESENT  PICC Triple Lumen 07/22/22 Right Basilic Vascular access-Site Assessment: WDL    Code Status: Full Code          Disposition Plan    Expected Discharge Date: unclear at this time; pending clinical improvement      Clinically Significant Risk Factors Present on Admission                         Page Me (7 am to 6 pm)    Care plan discussed with nursing and patient's daughter Alissa over the phone.    >35 minutes  spent today on my first encounter with this patient with a very prolonged and complicated hospital stay              Physical Exam:      Blood pressure 128/79, pulse 82, temperature 96.9  F (36.1  C), temperature source Axillary, resp. rate 14, weight 51.8 kg (114 lb 4.8 oz), SpO2 96 %, not currently breastfeeding.  Vitals:    08/13/22 0600 08/14/22 0500 08/15/22 0400   Weight: 59.6 kg (131 lb 6.3 oz) 55.7 kg (122 lb 12.7 oz) 51.8 kg (114 lb 4.8 oz)     Vital Signs with Ranges  Temp:  [96.9  F (36.1  C)-99.5  F (37.5  C)] 96.9  F (36.1  C)  Pulse:  [] 82  Resp:  [14-18] 14  BP: (111-133)/(59-79) 128/79  SpO2:  [96 %-100 %] 96 %  I/O's Last 24 hours  I/O last 3 completed shifts:  In: 2688 [I.V.:300; NG/GT:1360]  Out: 1300 [Urine:1300]    Constitutional:  lethargic; moans occasionally ; does not participate in conversation    HEENT: Pupils equal and reactive to light and accomodation, neck supple    Oral cavity: Dry oral mucosa   Cardiovascular: Normal s1 s2, regular rate and rhythm, no murmur   Lungs: B/l clear to auscultation, no wheezes or crepitations   Abdomen: Soft, nt, nd, no guarding, rigidity or rebound; BS +   LE : No edema   Musculoskeletal: Power 5/5 in all extremities   Neuro: No focal neurological deficits noted   Psychiatry: lethargic  Rectal tube in place                Medications:          banatrol plus  1 packet Per Feeding Tube TID w/meals     insulin aspart  1-6 Units Subcutaneous Q4H     insulin glargine  10 Units Subcutaneous At Bedtime     levETIRAcetam  1,000 mg Intravenous Q12H     meropenem  2 g Intravenous Q8H     miconazole with skin protectant   Topical BID     multivitamins w/minerals  15 mL Per Feeding Tube Daily     OXcarbazepine  450 mg Oral or Feeding Tube At Bedtime     pantoprazole  40 mg Oral or Feeding Tube QAM AC     sodium chloride (PF)  10-40 mL Intracatheter Q7 Days     topiramate  50 mg Oral or Feeding Tube At Bedtime     vancomycin  125 mg Oral or Feeding Tube 4x  Daily     vancomycin  1,500 mg Intravenous Q24H     PRN Meds: acetaminophen **OR** acetaminophen, artificial tears ophthalmic solution, glucose **OR** dextrose **OR** glucagon, [DISCONTINUED] diphenhydrAMINE **OR** diphenhydrAMINE, diphenhydrAMINE, guaiFENesin, ipratropium - albuterol 0.5 mg/2.5 mg/3 mL, - MEDICATION INSTRUCTIONS -, metoprolol, miconazole with skin protectant, morphine, naloxone **OR** naloxone **OR** naloxone **OR** naloxone, ondansetron **OR** ondansetron, oxyCODONE, prochlorperazine **OR** prochlorperazine **OR** prochlorperazine, sodium chloride (PF), sodium chloride (PF)         Data:      All new lab and imaging data was reviewed.   Recent Labs   Lab Test 08/21/22  0514 08/20/22  0542 08/19/22  0559 07/30/22  0559 07/29/22  1414 07/28/22  0408 07/27/22  2231 07/20/22  0923 07/19/22  2255   WBC 6.6 7.7 11.6*   < >  --    < > 6.8   < >  --    HGB 9.4* 9.3* 9.2*   < >  --    < > 9.6*   < >  --    MCV 86 84 86   < >  --    < > 73*   < >  --    * 660* 663*   < >  --    < > 547*   < >  --    INR  --   --   --   --  1.34*  --  1.13  --  1.36*    < > = values in this interval not displayed.      Recent Labs   Lab Test 08/22/22  0603 08/22/22  0449 08/22/22  0105 08/21/22  0741 08/21/22  0514 08/20/22  1954 08/20/22  1706 08/20/22  0859 08/20/22  0542     --   --   --  146*  --   --   --  149*   POTASSIUM 3.4  --   --   --  3.6  --  4.4  --  3.4   CHLORIDE 106  --   --   --  115*  --   --   --  117*   CO2 26  --   --   --  28  --   --   --  30   BUN 13  --   --   --  15  --   --   --  17   CR 0.43*  --   --   --  0.44*  --   --   --  0.48*   ANIONGAP 5  --   --   --  3  --   --   --  2*   ADY 8.5  --   --   --  9.0  --   --   --  9.0   * 134* 115*   < > 128*   < >  --    < > 96    < > = values in this interval not displayed.     No lab results found.    Invalid input(s): TROP, TROPONINIES

## 2022-08-22 NOTE — PLAN OF CARE
Patient name: Julisa Chaney    Nursing shift note  Summary: Patient in with brain absess  Alertness/orientation: Alert but wont answer questions  Neuro: No focal deficits  Cardiac: Normal sinus  Resp: Having coughing spells and trouble dealing with secretions, suctioned once  GI: PEG tube running at 50  : External cath running well  IV: PICC line in right arm WDL  Mobility: Lift  CMS: Intact pulses but very cold right foot, could be due to being left uncovered will continue to monitor  Pain: reports head pain, tylenol, oxycodone and morphine given  Diet: NPO  Takes meds: via peg tube  Skin: Redness in khushbu area  Plan:   Other Important Info:       Ed Salvador, RN  [facility] [address] [facility]  [phone]

## 2022-08-22 NOTE — PLAN OF CARE
Goal Outcome Evaluation:          Overall Patient Progress: no change    Outcome Evaluation: TF via PEG: Jevity 1.5 @ 50 mL/hr.  Pt continues with stooling via rectal tube (is on vanco for cdiff and also getting K replacement - which can cause diarrhea).  Will trial a semi-elemental formula ---> Vital 1.5 @ 50 mL/hr.  Start new formula at 25 mL/hr, increase to goal rate after 6 hrs.

## 2022-08-23 LAB
BACTERIA CSF CULT: NO GROWTH
BASOPHILS # BLD AUTO: 0.1 10E3/UL (ref 0–0.2)
BASOPHILS NFR BLD AUTO: 1 %
EOSINOPHIL # BLD AUTO: 0.7 10E3/UL (ref 0–0.7)
EOSINOPHIL NFR BLD AUTO: 9 %
ERYTHROCYTE [DISTWIDTH] IN BLOOD BY AUTOMATED COUNT: 26.6 % (ref 10–15)
GLUCOSE BLDC GLUCOMTR-MCNC: 106 MG/DL (ref 70–99)
GLUCOSE BLDC GLUCOMTR-MCNC: 107 MG/DL (ref 70–99)
GLUCOSE BLDC GLUCOMTR-MCNC: 112 MG/DL (ref 70–99)
GLUCOSE BLDC GLUCOMTR-MCNC: 112 MG/DL (ref 70–99)
GLUCOSE BLDC GLUCOMTR-MCNC: 123 MG/DL (ref 70–99)
GLUCOSE BLDC GLUCOMTR-MCNC: 99 MG/DL (ref 70–99)
GRAM STAIN RESULT: NORMAL
GRAM STAIN RESULT: NORMAL
HCT VFR BLD AUTO: 35.5 % (ref 35–47)
HGB BLD-MCNC: 10.2 G/DL (ref 11.7–15.7)
IMM GRANULOCYTES # BLD: 0.1 10E3/UL
IMM GRANULOCYTES NFR BLD: 2 %
LYMPHOCYTES # BLD AUTO: 1.8 10E3/UL (ref 0.8–5.3)
LYMPHOCYTES NFR BLD AUTO: 24 %
MCH RBC QN AUTO: 24.1 PG (ref 26.5–33)
MCHC RBC AUTO-ENTMCNC: 28.7 G/DL (ref 31.5–36.5)
MCV RBC AUTO: 84 FL (ref 78–100)
MONOCYTES # BLD AUTO: 0.9 10E3/UL (ref 0–1.3)
MONOCYTES NFR BLD AUTO: 12 %
NEUTROPHILS # BLD AUTO: 3.8 10E3/UL (ref 1.6–8.3)
NEUTROPHILS NFR BLD AUTO: 52 %
NRBC # BLD AUTO: 0 10E3/UL
NRBC BLD AUTO-RTO: 0 /100
PHOSPHATE SERPL-MCNC: 2.5 MG/DL (ref 2.5–4.5)
PLATELET # BLD AUTO: 553 10E3/UL (ref 150–450)
POTASSIUM BLD-SCNC: 3.2 MMOL/L (ref 3.4–5.3)
POTASSIUM BLD-SCNC: 3.4 MMOL/L (ref 3.4–5.3)
RBC # BLD AUTO: 4.24 10E6/UL (ref 3.8–5.2)
WBC # BLD AUTO: 7.4 10E3/UL (ref 4–11)

## 2022-08-23 PROCEDURE — 99232 SBSQ HOSP IP/OBS MODERATE 35: CPT | Performed by: HOSPITALIST

## 2022-08-23 PROCEDURE — 250N000013 HC RX MED GY IP 250 OP 250 PS 637: Performed by: HOSPITALIST

## 2022-08-23 PROCEDURE — 84132 ASSAY OF SERUM POTASSIUM: CPT | Performed by: HOSPITALIST

## 2022-08-23 PROCEDURE — 250N000011 HC RX IP 250 OP 636: Performed by: NURSE PRACTITIONER

## 2022-08-23 PROCEDURE — 999N000190 HC STATISTIC VAT ROUNDS

## 2022-08-23 PROCEDURE — 85025 COMPLETE CBC W/AUTO DIFF WBC: CPT | Performed by: HOSPITALIST

## 2022-08-23 PROCEDURE — 250N000011 HC RX IP 250 OP 636: Performed by: HOSPITALIST

## 2022-08-23 PROCEDURE — 250N000011 HC RX IP 250 OP 636: Performed by: INTERNAL MEDICINE

## 2022-08-23 PROCEDURE — 84100 ASSAY OF PHOSPHORUS: CPT | Performed by: HOSPITALIST

## 2022-08-23 PROCEDURE — 258N000003 HC RX IP 258 OP 636: Performed by: INTERNAL MEDICINE

## 2022-08-23 PROCEDURE — 258N000003 HC RX IP 258 OP 636: Performed by: HOSPITALIST

## 2022-08-23 PROCEDURE — 99232 SBSQ HOSP IP/OBS MODERATE 35: CPT | Performed by: INTERNAL MEDICINE

## 2022-08-23 PROCEDURE — 120N000001 HC R&B MED SURG/OB

## 2022-08-23 PROCEDURE — 250N000013 HC RX MED GY IP 250 OP 250 PS 637: Performed by: INTERNAL MEDICINE

## 2022-08-23 RX ORDER — POTASSIUM CHLORIDE 20MEQ/15ML
40 LIQUID (ML) ORAL ONCE
Status: COMPLETED | OUTPATIENT
Start: 2022-08-23 | End: 2022-08-23

## 2022-08-23 RX ADMIN — LEVETIRACETAM 1000 MG: 100 SOLUTION ORAL at 20:06

## 2022-08-23 RX ADMIN — ACETAMINOPHEN 650 MG: 325 SUSPENSION ORAL at 04:26

## 2022-08-23 RX ADMIN — Medication 1 PACKET: at 10:27

## 2022-08-23 RX ADMIN — ACETAMINOPHEN 650 MG: 325 SUSPENSION ORAL at 22:04

## 2022-08-23 RX ADMIN — MICONAZOLE NITRATE: 20 CREAM TOPICAL at 20:15

## 2022-08-23 RX ADMIN — ALTEPLASE 2 MG: 2.2 INJECTION, POWDER, LYOPHILIZED, FOR SOLUTION INTRAVENOUS at 00:33

## 2022-08-23 RX ADMIN — LEVETIRACETAM 1000 MG: 100 SOLUTION ORAL at 10:22

## 2022-08-23 RX ADMIN — VANCOMYCIN HYDROCHLORIDE 125 MG: KIT at 14:06

## 2022-08-23 RX ADMIN — TOPIRAMATE 50 MG: 50 TABLET ORAL at 22:04

## 2022-08-23 RX ADMIN — Medication 15 ML: at 10:23

## 2022-08-23 RX ADMIN — VANCOMYCIN HYDROCHLORIDE 125 MG: KIT at 18:33

## 2022-08-23 RX ADMIN — POTASSIUM CHLORIDE 40 MEQ: 20 SOLUTION ORAL at 10:22

## 2022-08-23 RX ADMIN — MEROPENEM 2 G: 1 INJECTION, POWDER, FOR SOLUTION INTRAVENOUS at 20:06

## 2022-08-23 RX ADMIN — VANCOMYCIN HYDROCHLORIDE 125 MG: KIT at 10:22

## 2022-08-23 RX ADMIN — MEROPENEM 2 G: 1 INJECTION, POWDER, FOR SOLUTION INTRAVENOUS at 04:26

## 2022-08-23 RX ADMIN — Medication 1 PACKET: at 14:06

## 2022-08-23 RX ADMIN — OXYCODONE HYDROCHLORIDE 5 MG: 5 TABLET ORAL at 04:26

## 2022-08-23 RX ADMIN — OXCARBAZEPINE 450 MG: 300 SUSPENSION ORAL at 22:04

## 2022-08-23 RX ADMIN — VANCOMYCIN HYDROCHLORIDE 125 MG: KIT at 22:04

## 2022-08-23 RX ADMIN — VANCOMYCIN HYDROCHLORIDE 1500 MG: 5 INJECTION, POWDER, LYOPHILIZED, FOR SOLUTION INTRAVENOUS at 10:23

## 2022-08-23 RX ADMIN — MICONAZOLE NITRATE: 20 CREAM TOPICAL at 10:24

## 2022-08-23 RX ADMIN — Medication 40 MG: at 10:22

## 2022-08-23 RX ADMIN — MORPHINE SULFATE 2 MG: 4 INJECTION, SOLUTION INTRAMUSCULAR; INTRAVENOUS at 00:33

## 2022-08-23 RX ADMIN — MEROPENEM 2 G: 1 INJECTION, POWDER, FOR SOLUTION INTRAVENOUS at 14:06

## 2022-08-23 RX ADMIN — Medication 1 PACKET: at 18:34

## 2022-08-23 ASSESSMENT — ACTIVITIES OF DAILY LIVING (ADL)
ADLS_ACUITY_SCORE: 42
ADLS_ACUITY_SCORE: 46
ADLS_ACUITY_SCORE: 42
ADLS_ACUITY_SCORE: 40
ADLS_ACUITY_SCORE: 42
ADLS_ACUITY_SCORE: 40

## 2022-08-23 NOTE — PROVIDER NOTIFICATION
On call hospitalist diana and AGUEDA Carpenter requested for patient's PICC line as not returning blood.   Kirk Cyr RN on 8/22/2022 at 10:25 PM

## 2022-08-23 NOTE — PROGRESS NOTES
[facility]    Infectious Disease Progress Note    Date of Service : 08/23/2022        Assessment:  Patient transferred 7/27 from [facility] for concern for brain abscess (recovered COVID) - MRI suggestive of ventriculitis with left lateral ventricle abscess. Has been on broad spectrum antibiotics, now on Meropenem/Vancomycin, mental status improved, s/p left frontal ventriculostomy  CSF cxs have remained negative. Course complicated by development of C.diff colitis. Now has ongoing headache but CSF cell count has improved and appears more lymphocytic . ? CSF leak     Recommendations  1. Continue current antimicrobial therapy Vancomycin, Meropenem.  6 week treatment planned until 9/8  2. Fall cxs have remained negative. Mental status has improved, remains afebrile , Leukocytosis has resolved, CRP improved and CSF cell count is improving all suggestive of improvement.   3. Unclear cause of headache ? CSF leak  4. MRI can be pursued for further evaluation will defer to MIGDALIA Schmitt MD    Interval History   Does not complain of headache,  CSF appears improved, patient has consistently declined MRI scans, Neurosurgery is following  Afebrile     Physical Exam   Temp: 98.2  F (36.8  C) Temp src: Oral BP: 133/74 Pulse: 92   Resp: 16 SpO2: 99 % O2 Device: None (Room air)    Vitals:    08/14/22 0500 08/15/22 0400 08/22/22 0933   Weight: 55.7 kg (122 lb 12.7 oz) 51.8 kg (114 lb 4.8 oz) 56 kg (123 lb 7.3 oz)     Vital Signs with Ranges  Temp:  [96.9  F (36.1  C)-98.3  F (36.8  C)] 98.2  F (36.8  C)  Pulse:  [68-92] 92  Resp:  [14-16] 16  BP: (108-133)/(54-74) 133/74  SpO2:  [96 %-99 %] 99 %    Constitutional:awake, does not complaint of headache today , responds appropriately, moves all extremities  Lungs: Clear to auscultation bilaterally, no crackles or wheezing  Cardiovascular: Regular rate and rhythm, normal S1 and S2, and no murmur noted  Abdomen: soft, G tube in place  Skin: No  rash    Other:    Medications     - MEDICATION INSTRUCTIONS -         banatrol plus  1 packet Per Feeding Tube TID w/meals     insulin aspart  1-6 Units Subcutaneous Q4H     insulin glargine  10 Units Subcutaneous At Bedtime     levETIRAcetam  1,000 mg Oral or Feeding Tube Q12H     meropenem  2 g Intravenous Q8H     miconazole with skin protectant   Topical BID     multivitamins w/minerals  15 mL Per Feeding Tube Daily     OXcarbazepine  450 mg Oral or Feeding Tube At Bedtime     pantoprazole  40 mg Oral or Feeding Tube QAM AC     potassium chloride  40 mEq Oral or Feeding Tube Once     sodium chloride (PF)  10-40 mL Intracatheter Q7 Days     topiramate  50 mg Oral or Feeding Tube At Bedtime     vancomycin  125 mg Oral or Feeding Tube 4x Daily     vancomycin  1,500 mg Intravenous Q24H       Data   All microbiology laboratory data reviewed.  Recent Labs   Lab Test 08/21/22  0514 08/20/22  0542 08/19/22  0559   WBC 6.6 7.7 11.6*   HGB 9.4* 9.3* 9.2*   HCT 33.6* 32.9* 33.3*   MCV 86 84 86   * 660* 663*     Recent Labs   Lab Test 08/22/22  0603 08/21/22  0514 08/20/22  0542   CR 0.43* 0.44* 0.48*     Recent Labs   Lab Test 08/08/20  0212   SED 13

## 2022-08-23 NOTE — PROGRESS NOTES
Patient has been lethargic,SHREYAS orientation,  vss, a-febrile, appears to be in no distress, patient does not follow commands, patient is NPO, on tube feeding at 50 ML goal with 200 ml water flush Q 4 hrs, incontinent , rectal tube/ pure wick in place, turn and reposition Q 2 hrs, s/p Left frontal Ventriculostomy pod # 23, pt on abx for MRSA, skin is intact, discharge pending.

## 2022-08-23 NOTE — PLAN OF CARE
Goal Outcome Evaluation:    Reason for Admission: Cerebral abscess, ventriculitis, hyrdrocephalus, POD EVD removal    Cognitive/Mentation: A/O to self.   Neuros/CMS: Drowsy @ times. L side slightly weaker. Does not consistently follow commands. Soft speech. CMS intact.   VS: stable on RA.   Tele: NSR.  GI: BS active, passing flatus, rectal tube in place draining loose/watery brown stool.   : Purewick with adequate output. Incontinent.  Pulmonary: LS diminished.  Pain: reaches at head and moans at times, PRN tylenol, oxy, & morphine admin with mild relief.     Drains/Lines: PICC intact, CathFLo admin d/t no blood return. Now returning blood and saline locked all 3 lumens.  Skin: Scattered bruising. Buttocks reddened and improving- Darryl cream applied with changes.  Activity: Assist x 2 with lift.  Diet: NPO. Vital 1.5 infusing in PEG tube. Meds per PEG.     Aggression Stoplight Tool: green     End of shift summary: neuros stable overnight. Turned Q2.

## 2022-08-23 NOTE — PROGRESS NOTES
[facility]  Hospitalist Progress Note        Brian Mcneill MD   08/23/2022        Interval History:        - Seems slightly more alert but does not engage in conversation, answers simple questions with head nodding  - K 3.2-- supplement per protocol         Assessment and Plan:        Julisa Chaney is a 77-year-old woman with PMH of trigeminal neuralgia who was recently admitted to [facility] on 7/19 for severe sepsis (had leukocytosis, lactic acidosis and elevated procalcitonin) due to COVID-19 infection and suspected bacterial pneumonia. She was treated with 5 days of remdesivir and did not require any steroids , also treated with IV vancomycin and IV meropenem.     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  She was switched to IV vancomycin, cefepime and metronidazole and transferred to [facility] for neurosurgery assessment. MRI brain w/wo contrast 7/30 showed ventriculitis with probable abscess. Neurosurgery/neurology/infectious disease/oncology consulted. Patient taken to the OR on 7/31 for left frontal ventriculostomy.   Since then has remained on broad spectrum antibiotics.  In addition she has developed C. Difficile colitis and is on treatment.  She has continued to have a significant encephalopathy.     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  - flow cytometry was done which did not show any evidence of malignancy.  - s/p Left frontal ventriculostomy on 7/31/2022, EVD removed 8/8; CSF cultures have remained negative; CSF counts decreasing 469---233 ---56 (last  CSF from 8/18)  - last head CT from 8/19 with stable presumed vasogenic edema in the white matter of the anterior inferior left frontal lobe related to ventriculitis of the left lateral ventricle; stable ventriculomegaly of the left lateral ventricle  - neurosurgery/ID following  - remains afebrile; wbc 21--->6.6; was initially on vancomycin, cefepime, and metronidazole-- then Meropenem and Vancomycin; ID following, plan for 6 weeks treatment until 9/8/22     Acute infectious encephalopathy due to above:  - mentation apparently improved since admission but no significant progress; minimally communicative ; per charts she continues to rarely speak and doesn't consistently follow commands.    -Continue to treat underlying issues as noted above.    -Maintain normal sleep/wake cycle as able.  - will discontinue prn benadryl, narcotics     C. difficile colitis:  Patient was noted to have copious diarrhea on 8/1 and sample came back positive for C. Difficile.  -Continue oral vancomycin.  Will need a prolonged treatment given other abx use.  - continue Rectal tube   -ID following as noted above.     Acute on chronic anemia:  - Prior labs show baseline hemoglobin of about 9-10.   - Hb on admission was 11.7, slowly trended down, got 1 unit PRBC on 8/8/22 for Hb 6.9  - No obvious ongoing bleeding noted; Hb hs now remained stable around 9 to 9.5  - iron panel on 7/30/22 showed that her total iron was low at 14, binding capacity was low at 28, iron saturation was 6 and B12 was high and folate was normal  - received IV iron infusion x3 with first on 8/11/22.  -Continue to monitor hgb      History of trigeminal neuralgia  - Continue PTA Trileptal , Keppra and Topamax.     History of chronic pyloric ulcer  She had EGD done in 2020 which showed gastroduodenitis with a duodenal stricture.  - Continue PPI.     Hyperglycemia  Recent HbA1c was 5.6 on 7/19/22. Was not on any medication prior to admission.  - Continue medium dose sliding  scale insulin every 4 hours while on tube feeds  - started on Lantus 10 units bedtime, continue .      Intermittent Hypokalemia, Hypo/hypernatremia, Hypophosphatemia:  - Recheck and replace per protocol.  - Continue to follow trend  - hypernatremia improved; continue free water flushes 200 ml  every 4 hrs     Severe protein calorie malnutrition:  - Nutrition following for tube feeds via G tube (placed 8/16) ; getting free water flushes  - Will need SLP consult when more able to participate     Physical deconditioning:  - will need TCU/ LTC placement; currently unable to participate with therapy     Diet: NPO for Medical/Clinical Reasons Except for: Other; Specify: NPO For oral intake and gastric feedings for 6 hours post insertion Gastrostomy G/GJ tube. May feed through Jejunal or Distal PORT immediately for GJ tubes ONLY.  Adult Formula Drip Feeding: Continuous Vital 1.5; Gastrostomy; Goal Rate: 50; mL/hr; Medication - Feeding Tube Flush Frequency: At least 15-30 mL water before and after medication administration and with tube clogging; Amount to Send (Nutrition us...      DVT Prophylaxis: Pneumatic Compression Devices    Central Lines: PRESENT  PICC Triple Lumen 07/22/22 Right Basilic Vascular access-Site Assessment: WDL    Code Status: Full Code          Disposition Plan    Expected Discharge Date: unclear at this time; pending clinical improvement      Clinically Significant Risk Factors Present on Admission                         Page Me (7 am to 6 pm)              Physical Exam:      Blood pressure 108/54, pulse 84, temperature 97.4  F (36.3  C), temperature source Oral, resp. rate 16, weight 56 kg (123 lb 7.3 oz), SpO2 96 %, not currently breastfeeding.  Vitals:    08/14/22 0500 08/15/22 0400 08/22/22 0933   Weight: 55.7 kg (122 lb 12.7 oz) 51.8 kg (114 lb 4.8 oz) 56 kg (123 lb 7.3 oz)     Vital Signs with Ranges  Temp:  [96.9  F (36.1  C)-98.3  F (36.8  C)] 97.4  F (36.3  C)  Pulse:  [68-85] 84  Resp:   [14-16] 16  BP: (108-118)/(54-69) 108/54  SpO2:  [96 %-98 %] 96 %  I/O's Last 24 hours  I/O last 3 completed shifts:  In: 1830 [NG/GT:1830]  Out: 2125 [Urine:2125]    Constitutional:  slightly more alert but does not engage in conversation, answers simple questions with head nodding; appears comfortable   HEENT: Pupils equal and reactive to light and accomodation, neck supple    Oral cavity: Dry oral mucosa   Cardiovascular: Normal s1 s2, regular rate and rhythm, no murmur   Lungs: B/l clear to auscultation, no wheezes or crepitations   Abdomen: Soft, nt, nd, no guarding, rigidity or rebound; BS +   LE : No edema   Musculoskeletal: Seems to be moving all extremities equally   Neuro: No focal neurological deficits noted   Rectal tube in place with brownish liquid stool                Medications:          banatrol plus  1 packet Per Feeding Tube TID w/meals     insulin aspart  1-6 Units Subcutaneous Q4H     insulin glargine  10 Units Subcutaneous At Bedtime     levETIRAcetam  1,000 mg Oral or Feeding Tube Q12H     meropenem  2 g Intravenous Q8H     miconazole with skin protectant   Topical BID     multivitamins w/minerals  15 mL Per Feeding Tube Daily     OXcarbazepine  450 mg Oral or Feeding Tube At Bedtime     pantoprazole  40 mg Oral or Feeding Tube QAM AC     potassium chloride  40 mEq Oral or Feeding Tube Once     sodium chloride (PF)  10-40 mL Intracatheter Q7 Days     topiramate  50 mg Oral or Feeding Tube At Bedtime     vancomycin  125 mg Oral or Feeding Tube 4x Daily     vancomycin  1,500 mg Intravenous Q24H     PRN Meds: acetaminophen **OR** acetaminophen, artificial tears ophthalmic solution, glucose **OR** dextrose **OR** glucagon, [DISCONTINUED] diphenhydrAMINE **OR** diphenhydrAMINE, diphenhydrAMINE, guaiFENesin, ipratropium - albuterol 0.5 mg/2.5 mg/3 mL, - MEDICATION INSTRUCTIONS -, metoprolol, miconazole with skin protectant, morphine, naloxone **OR** naloxone **OR** naloxone **OR** naloxone,  ondansetron **OR** ondansetron, oxyCODONE, prochlorperazine **OR** prochlorperazine **OR** prochlorperazine, sodium chloride (PF), sodium chloride (PF)         Data:      All new lab and imaging data was reviewed.   Recent Labs   Lab Test 08/21/22  0514 08/20/22  0542 08/19/22  0559 07/30/22  0559 07/29/22  1414 07/28/22  0408 07/27/22  2231 07/20/22  0923 07/19/22  2255   WBC 6.6 7.7 11.6*   < >  --    < > 6.8   < >  --    HGB 9.4* 9.3* 9.2*   < >  --    < > 9.6*   < >  --    MCV 86 84 86   < >  --    < > 73*   < >  --    * 660* 663*   < >  --    < > 547*   < >  --    INR  --   --   --   --  1.34*  --  1.13  --  1.36*    < > = values in this interval not displayed.      Recent Labs   Lab Test 08/23/22  0730 08/23/22  0624 08/23/22  0402 08/22/22  2344 08/22/22  1726 08/22/22  1530 08/22/22  0809 08/22/22  0603 08/21/22  0741 08/21/22  0514 08/20/22  0859 08/20/22  0542   NA  --   --   --   --   --   --   --  137  --  146*  --  149*   POTASSIUM  --  3.2*  --   --   --  3.9  --  3.4  --  3.6   < > 3.4   CHLORIDE  --   --   --   --   --   --   --  106  --  115*  --  117*   CO2  --   --   --   --   --   --   --  26  --  28  --  30   BUN  --   --   --   --   --   --   --  13  --  15  --  17   CR  --   --   --   --   --   --   --  0.43*  --  0.44*  --  0.48*   ANIONGAP  --   --   --   --   --   --   --  5  --  3  --  2*   ADY  --   --   --   --   --   --   --  8.5  --  9.0  --  9.0   *  --  99 133*   < >  --    < > 118*   < > 128*   < > 96    < > = values in this interval not displayed.     No lab results found.    Invalid input(s): TROP, TROPONINIES

## 2022-08-23 NOTE — PLAN OF CARE
Problem: Plan of Care - These are the overarching goals to be used throughout the patient stay.    Goal: Plan of Care Review/Shift Note  Description: The Plan of Care Review/Shift note should be completed every shift.  The Outcome Evaluation is a brief statement about your assessment that the patient is improving, declining, or no change.  This information will be displayed automatically on your shift note.  Outcome: Ongoing, Progressing     Problem: Infection  Goal: Absence of Infection Signs and Symptoms  Outcome: Ongoing, Progressing   Goal Outcome Evaluation:      Pt receiving IV antibiotics for cerebral abcess, patient in bed, turn and reposition.  Rectal tube in place and draining.  Purewick being used for urine collection as well.  TF running, alert to self only.

## 2022-08-24 ENCOUNTER — APPOINTMENT (OUTPATIENT)
Dept: OCCUPATIONAL THERAPY | Facility: CLINIC | Age: 77
DRG: 023 | End: 2022-08-24
Attending: INTERNAL MEDICINE
Payer: COMMERCIAL

## 2022-08-24 LAB
ANION GAP SERPL CALCULATED.3IONS-SCNC: 4 MMOL/L (ref 3–14)
BUN SERPL-MCNC: 12 MG/DL (ref 7–30)
CALCIUM SERPL-MCNC: 8.7 MG/DL (ref 8.5–10.1)
CHLORIDE BLD-SCNC: 108 MMOL/L (ref 94–109)
CO2 SERPL-SCNC: 26 MMOL/L (ref 20–32)
CREAT SERPL-MCNC: 0.33 MG/DL (ref 0.52–1.04)
GFR SERPL CREATININE-BSD FRML MDRD: >90 ML/MIN/1.73M2
GLUCOSE BLD-MCNC: 124 MG/DL (ref 70–99)
GLUCOSE BLDC GLUCOMTR-MCNC: 116 MG/DL (ref 70–99)
GLUCOSE BLDC GLUCOMTR-MCNC: 120 MG/DL (ref 70–99)
GLUCOSE BLDC GLUCOMTR-MCNC: 124 MG/DL (ref 70–99)
GLUCOSE BLDC GLUCOMTR-MCNC: 139 MG/DL (ref 70–99)
GLUCOSE BLDC GLUCOMTR-MCNC: 94 MG/DL (ref 70–99)
GLUCOSE BLDC GLUCOMTR-MCNC: 99 MG/DL (ref 70–99)
PHOSPHATE SERPL-MCNC: 2.4 MG/DL (ref 2.5–4.5)
POTASSIUM BLD-SCNC: 3.8 MMOL/L (ref 3.4–5.3)
SODIUM SERPL-SCNC: 138 MMOL/L (ref 133–144)
VANCOMYCIN SERPL-MCNC: 14.3 MG/L

## 2022-08-24 PROCEDURE — 120N000001 HC R&B MED SURG/OB

## 2022-08-24 PROCEDURE — 250N000013 HC RX MED GY IP 250 OP 250 PS 637: Performed by: HOSPITALIST

## 2022-08-24 PROCEDURE — 258N000003 HC RX IP 258 OP 636: Performed by: HOSPITALIST

## 2022-08-24 PROCEDURE — 97530 THERAPEUTIC ACTIVITIES: CPT | Mod: GO | Performed by: OCCUPATIONAL THERAPIST

## 2022-08-24 PROCEDURE — 250N000011 HC RX IP 250 OP 636: Performed by: HOSPITALIST

## 2022-08-24 PROCEDURE — 80202 ASSAY OF VANCOMYCIN: CPT | Performed by: INTERNAL MEDICINE

## 2022-08-24 PROCEDURE — 99232 SBSQ HOSP IP/OBS MODERATE 35: CPT | Performed by: HOSPITALIST

## 2022-08-24 PROCEDURE — 258N000003 HC RX IP 258 OP 636: Performed by: INTERNAL MEDICINE

## 2022-08-24 PROCEDURE — 84100 ASSAY OF PHOSPHORUS: CPT | Performed by: HOSPITALIST

## 2022-08-24 PROCEDURE — 250N000011 HC RX IP 250 OP 636: Performed by: INTERNAL MEDICINE

## 2022-08-24 PROCEDURE — 250N000013 HC RX MED GY IP 250 OP 250 PS 637: Performed by: INTERNAL MEDICINE

## 2022-08-24 PROCEDURE — 80048 BASIC METABOLIC PNL TOTAL CA: CPT | Performed by: HOSPITALIST

## 2022-08-24 PROCEDURE — G0463 HOSPITAL OUTPT CLINIC VISIT: HCPCS

## 2022-08-24 RX ADMIN — Medication 15 ML: at 09:25

## 2022-08-24 RX ADMIN — VANCOMYCIN HYDROCHLORIDE 125 MG: KIT at 21:55

## 2022-08-24 RX ADMIN — MEROPENEM 2 G: 1 INJECTION, POWDER, FOR SOLUTION INTRAVENOUS at 04:20

## 2022-08-24 RX ADMIN — Medication 1 PACKET: at 18:05

## 2022-08-24 RX ADMIN — ACETAMINOPHEN 650 MG: 325 SUSPENSION ORAL at 10:11

## 2022-08-24 RX ADMIN — Medication 40 MG: at 09:25

## 2022-08-24 RX ADMIN — VANCOMYCIN HYDROCHLORIDE 125 MG: KIT at 18:06

## 2022-08-24 RX ADMIN — MEROPENEM 2 G: 1 INJECTION, POWDER, FOR SOLUTION INTRAVENOUS at 11:57

## 2022-08-24 RX ADMIN — VANCOMYCIN HYDROCHLORIDE 125 MG: KIT at 13:16

## 2022-08-24 RX ADMIN — OXCARBAZEPINE 450 MG: 300 SUSPENSION ORAL at 21:55

## 2022-08-24 RX ADMIN — VANCOMYCIN HYDROCHLORIDE 1750 MG: 5 INJECTION, POWDER, LYOPHILIZED, FOR SOLUTION INTRAVENOUS at 13:12

## 2022-08-24 RX ADMIN — Medication 1 PACKET: at 09:26

## 2022-08-24 RX ADMIN — TOPIRAMATE 50 MG: 50 TABLET ORAL at 21:55

## 2022-08-24 RX ADMIN — ACETAMINOPHEN 650 MG: 325 SUSPENSION ORAL at 22:46

## 2022-08-24 RX ADMIN — VANCOMYCIN HYDROCHLORIDE 125 MG: KIT at 09:25

## 2022-08-24 RX ADMIN — Medication 1 PACKET: at 13:12

## 2022-08-24 RX ADMIN — MICONAZOLE NITRATE: 20 CREAM TOPICAL at 22:12

## 2022-08-24 RX ADMIN — POTASSIUM & SODIUM PHOSPHATES POWDER PACK 280-160-250 MG 1 PACKET: 280-160-250 PACK at 15:44

## 2022-08-24 RX ADMIN — MEROPENEM 2 G: 1 INJECTION, POWDER, FOR SOLUTION INTRAVENOUS at 21:54

## 2022-08-24 RX ADMIN — ACETAMINOPHEN 650 MG: 325 SUSPENSION ORAL at 05:56

## 2022-08-24 RX ADMIN — MICONAZOLE NITRATE: 20 CREAM TOPICAL at 09:31

## 2022-08-24 RX ADMIN — POTASSIUM & SODIUM PHOSPHATES POWDER PACK 280-160-250 MG 1 PACKET: 280-160-250 PACK at 11:57

## 2022-08-24 RX ADMIN — LEVETIRACETAM 1000 MG: 100 SOLUTION ORAL at 21:55

## 2022-08-24 RX ADMIN — POTASSIUM & SODIUM PHOSPHATES POWDER PACK 280-160-250 MG 1 PACKET: 280-160-250 PACK at 06:52

## 2022-08-24 RX ADMIN — LEVETIRACETAM 1000 MG: 100 SOLUTION ORAL at 09:25

## 2022-08-24 ASSESSMENT — ACTIVITIES OF DAILY LIVING (ADL)
ADLS_ACUITY_SCORE: 42

## 2022-08-24 NOTE — PROGRESS NOTES
Patient is alert to self, vss, a-febrile, tylenol given x 1 for discomfort, patient was able to follow some commands today, seemed alert than yesterday, she was able to move upper extremities, unable to move lower extremities, turn and reposition Q 2 hrs, incontinent of B&B, pure wick in place with good urine output, rectal tube in place and patent, TF running at 50 ML goal with 200 ML water flush Q 4 hrs, neurosurgery following.

## 2022-08-24 NOTE — PROGRESS NOTES
[facility]  Neurosurgery Daily Progress Note    Assessment & Plan   Procedure(s):  LEFT FRONTAL VENTRICULOSTOMY   -24 Days Post-Op  -16 Days Post-Op removal of EVD  Today she was seen lying in bed. She appears comfortable. She is alert and follows some commands. She is able to move upper extremities to command and BLE independently. She denies headaches.     Plan:  - No surgical intervention planned   - Continue supportive and symptomatic treatment  - ID planning for 6 weeks of abx   - Appreciate assistance from specialties   - Follow up with NSG clinic as scheduled  - NSG will sign off. Please call with questions or concerns.       Discussed with Dr. Giovanna Chowdhury, CNP  [facility] Neurosurgery  [facility]  [address]  [place] [address]  Tel [phone]  Pager [phone]    Physical Exam   Temp: 97.5  F (36.4  C) Temp src: Axillary BP: 117/59 Pulse: 78   Resp: 16 SpO2: 98 % O2 Device: None (Room air)    Vitals:    08/15/22 0400 08/22/22 0933 08/24/22 0629   Weight: 51.8 kg (114 lb 4.8 oz) 56 kg (123 lb 7.3 oz) 48.3 kg (106 lb 7.7 oz)     Vital Signs with Ranges  Temp:  [97  F (36.1  C)-99.1  F (37.3  C)] 97.5  F (36.4  C)  Pulse:  [74-88] 78  Resp:  [16-20] 16  BP: (111-127)/(59-69) 117/59  SpO2:  [96 %-99 %] 98 %  I/O last 3 completed shifts:  In: 920 [NG/GT:920]  Out: 3000 [Urine:3000]    Alert to voice  Following simple commands  Moves all extremities     Medications     - MEDICATION INSTRUCTIONS -          banatrol plus  1 packet Per Feeding Tube TID w/meals     insulin aspart  1-6 Units Subcutaneous Q4H     insulin glargine  10 Units Subcutaneous At Bedtime     levETIRAcetam  1,000 mg Oral or Feeding Tube Q12H     meropenem  2 g Intravenous Q8H     miconazole with skin protectant   Topical BID     multivitamins w/minerals  15 mL Per Feeding Tube Daily     OXcarbazepine  450 mg Oral or Feeding Tube At Bedtime     pantoprazole   40 mg Oral or Feeding Tube QAM AC     potassium & sodium phosphates  1 packet Oral or Feeding Tube Q4H     sodium chloride (PF)  10-40 mL Intracatheter Q7 Days     topiramate  50 mg Oral or Feeding Tube At Bedtime     vancomycin  125 mg Oral or Feeding Tube 4x Daily     vancomycin  1,500 mg Intravenous Q24H       Claudia Chowdhury [facility] Neurosurgery   [facility]  [address] [place] [address] [address] [place] [id number]  Tel [phone]  Pager [phone]

## 2022-08-24 NOTE — PROGRESS NOTES
[facility]  WOC Nurse Inpatient Assessment     Consulted for: Buttocks, perineal    Summary: IAD (incontinence-associated dermatitis) to perineal area, recent hx Cdiff; skin issues resolving with rectal tube and Otf antifungal moisture barrier cream. No pressure injury noted but pt remains at risk for PI.  PIP (pressure injury prevention) recs in place, pt on Pulsate low air loss mattress.  WOC will sign off.     Areas Assessed:      Areas visualized during today's visit: Focused: and Sacrum/coccyx    Wound location: perianal / buttock     Last photo: 8-24-22 8/17      Wound due to: Incontinence Associated Dermatitis (IAD), Moisture Associated Skin Damage (MASD) and Fungal  Wound history/plan of care: hx Cdiff; pt incontinent of bowel/bladder, Purewick in use, pt needs assist x 2 for cares/repositioning.  Wound base: newly resurfaced epidermis, pink, dry     Palpation of the wound bed: normal      Drainage: none     Description of drainage: none and serous     Measurements (length x width x depth, in cm): general perianal without focal open area      Tunneling: N/A     Undermining: N/A  Periwound skin: Intact      Color: pink      Temperature: normal   Odor: none  Pain: mild  Pain interventions prior to dressing change: slow and gentle cares   Treatment goal: Maintain (prevention of deterioration) and Protection  STATUS: improving  Supplies ordered: at bedside       Treatment Plan:      Wound care  Start:  08/17/22 1815,   EVERY SHIFT,   Routine        Comments: Location: buttock / perianal   Care: provided qshift by primary RN   1. Cleanse perianal every 2 hours with routine turns, wipe feces leakage from around rectal tube (a small amount is expected)   2. Apply Otf antifungal to perianal and buttock and around rectal tube per MAR BID and PRN, apply after incontinence care          Pressure Injury Prevention (PIP) Plan:  -If patient is declining pressure injury prevention  interventions: Explore reason why and address patient's concerns  -Mattress: low air loss  -HOB: Maintain at or below 30 degrees, unless contraindicated  -Repositioning in bed: Every 1-2 hours , Left/right positioning; avoid supine and Raise foot of bed prior to raising head of bed, to reduce patient sliding down (shear)  -Heels: Keep elevated off mattress and Pillows under calves  -Protective Dressing: Sacral Mepilex for prevention (#[id number]),  especially for the agitated patient - keep dressing well above perianal area to minimize soilage and moisture-trapping  -Moisture Management: see WOC orders for perineal/buttock cares  -Under Devices: Inspect skin under all medical devices during skin inspection , Ensure tubes are stabilized without tension and Ensure patient is not lying on medical devices or equipment when repositioned      Orders: Reviewed and Updated    RECOMMEND PRIMARY TEAM ORDER: None, at this time  Education provided: plan of care, Moisture management and Hygiene  Discussed plan of care with: Patient and Nurse  WOC nurse follow-up plan: signing off  Notify WOC if wound(s) deteriorate.  Nursing to notify the Provider(s) and re-consult the WOC Nurse if new skin concern.    DATA:     Current support surface: Standard  Low air loss mattress with pulsation   Containment of urine/stool: Incontinence Protocol, Purewick external catheter  and Internal fecal management  BMI: Body mass index is 17.72 kg/m .   Active diet order: Orders Placed This Encounter      NPO for Medical/Clinical Reasons Except for: Other; Specify: NPO For oral intake and gastric feedings for 6 hours post insertion Gastrostomy G/GJ tube. May feed through Jejunal or Distal PORT immediately for GJ tubes ONLY.     Output: I/O last 3 completed shifts:  In: 920 [NG/GT:920]  Out: 2400 [Urine:2400]     Labs:   Recent Labs   Lab 08/23/22  1920 08/19/22  0559 08/18/22  0601   HGB 10.2*   < > 9.7*   WBC 7.4   < > 21.1*   CRP  --   --  17.3*    < >  = values in this interval not displayed.     Pressure injury risk assessment:   Sensory Perception: 2-->very limited  Moisture: 3-->occasionally moist  Activity: 1-->bedfast  Mobility: 2-->very limited  Nutrition: 3-->adequate  Friction and Shear: 1-->problem  Alli Score: 12    Mariama Montoya [facility]   Dept. Pager: [phone]  Dept. Office Number: [phone]

## 2022-08-24 NOTE — PROGRESS NOTES
[facility]  Hospitalist Progress Note        Brian Mcneill MD   08/24/2022        Interval History:        - patient alert, awake, but does not engage in conversation and follows commands inconsistently         Assessment and Plan:        Julisa Chaney is a 77-year-old woman with PMH of trigeminal neuralgia who was recently admitted to [facility] on 7/19 for severe sepsis (had leukocytosis, lactic acidosis and elevated procalcitonin) due to COVID-19 infection and suspected bacterial pneumonia. She was treated with 5 days of remdesivir and did not require any steroids , also treated with IV vancomycin and IV meropenem.     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  She was switched to IV vancomycin, cefepime and metronidazole and transferred to [facility] for neurosurgery assessment. MRI brain w/wo contrast 7/30 showed ventriculitis with probable abscess. Neurosurgery/neurology/infectious disease/oncology consulted. Patient taken to the OR on 7/31 for left frontal ventriculostomy.   Since then has remained on broad spectrum antibiotics.  In addition she has developed C. Difficile colitis and is on treatment.  She has continued to have a significant encephalopathy.     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  - flow cytometry was done which did not show any evidence of malignancy.  - s/p Left frontal ventriculostomy on 7/31/2022, EVD removed 8/8; CSF cultures have remained negative; CSF counts decreasing 469---233 ---56 (last CSF from 8/18)  - last head CT from 8/19 with  stable presumed vasogenic edema in the white matter of the anterior inferior left frontal lobe related to ventriculitis of the left lateral ventricle; stable ventriculomegaly of the left lateral ventricle  - neurosurgery/ID following  - remains afebrile; wbc 21--->6.6; was initially on vancomycin, cefepime, and metronidazole-- then Meropenem and Vancomycin; ID following, plan for 6 weeks treatment until 9/8/22     Acute infectious encephalopathy due to above:  - mentation apparently improved since admission but no significant progress; minimally communicative ; per charts she continues to rarely speak and doesn't consistently follow commands.    -Continue to treat underlying issues as noted above.    -Maintain normal sleep/wake cycle as able.  - 8/23 discontinued prn benadryl, narcotics     C. difficile colitis:  Patient was noted to have copious diarrhea on 8/1 and was noted positive for C. Difficile.  -Continue oral vancomycin.  Will need a prolonged treatment given other abx use.  - continue Rectal tube   -ID following as noted above.     Acute on chronic anemia:  - Prior labs show baseline hemoglobin of about 9-10.   - Hb on admission was 11.7, slowly trended down, got 1 unit PRBC on 8/8/22 for Hb 6.9  - No obvious ongoing bleeding noted; Hb hs now remained stable around 9 to 9.5  - iron panel on 7/30/22 showed that her total iron was low at 14, binding capacity was low at 28, iron saturation was 6 and B12 was high and folate was normal  - received IV iron infusion x3 with first on 8/11/22.  -Continue to monitor hgb      History of trigeminal neuralgia  - Continue PTA Trileptal , Keppra and Topamax.     History of chronic pyloric ulcer  She had EGD done in 2020 which showed gastroduodenitis with a duodenal stricture.  - Continue PPI.     Hyperglycemia  Recent HbA1c was 5.6 on 7/19/22. Was not on any medication prior to admission.  - Continue medium dose sliding scale insulin every 4 hours while on tube feeds  -  started on Lantus 10 units bedtime, continue .      Intermittent Hypokalemia, Hypo/hypernatremia, Hypophosphatemia:  - Recheck and replace per protocol.  - Continue to follow trend  - hypernatremia improved; continue free water flushes 200 ml  every 4 hrs     Severe protein calorie malnutrition:  - Nutrition following for tube feeds via G tube (placed 8/16) ; getting free water flushes  - Will need SLP consult when more able to participate     Physical deconditioning:  - will need TCU/ LTC placement; currently unable to participate with therapy  - might consider LTACH placement; will discuss with SW     Diet: NPO for Medical/Clinical Reasons Except for: Other; Specify: NPO For oral intake and gastric feedings for 6 hours post insertion Gastrostomy G/GJ tube. May feed through Jejunal or Distal PORT immediately for GJ tubes ONLY.  Adult Formula Drip Feeding: Continuous Vital 1.5; Gastrostomy; Goal Rate: 50; mL/hr; Medication - Feeding Tube Flush Frequency: At least 15-30 mL water before and after medication administration and with tube clogging; Amount to Send (Nutrition us...      DVT Prophylaxis: Pneumatic Compression Devices    Central Lines: PRESENT  PICC Triple Lumen 07/22/22 Right Basilic Vascular access-Site Assessment: WDL    Code Status: Full Code          Disposition Plan    Expected Discharge Date: unclear at this time; pending clinical improvement      Clinically Significant Risk Factors Present on Admission                         Page Me (7 am to 6 pm)              Physical Exam:      Blood pressure 119/59, pulse 77, temperature 97  F (36.1  C), temperature source Axillary, resp. rate 18, weight 48.3 kg (106 lb 7.7 oz), SpO2 97 %, not currently breastfeeding.  Vitals:    08/15/22 0400 08/22/22 0933 08/24/22 0629   Weight: 51.8 kg (114 lb 4.8 oz) 56 kg (123 lb 7.3 oz) 48.3 kg (106 lb 7.7 oz)     Vital Signs with Ranges  Temp:  [97  F (36.1  C)-99.1  F (37.3  C)] 97  F (36.1  C)  Pulse:  [74-92] 77  Resp:   [16-20] 18  BP: (111-133)/(59-74) 119/59  SpO2:  [96 %-99 %] 97 %  I/O's Last 24 hours  I/O last 3 completed shifts:  In: 920 [NG/GT:920]  Out: 3000 [Urine:3000]    Constitutional:  alert, awake, but does not engage in conversation and follows commands inconsistently; appears comfortable   HEENT: Pupils equal and reactive to light and accomodation, neck supple    Oral cavity: Dry oral mucosa   Cardiovascular: Normal s1 s2, regular rate and rhythm, no murmur   Lungs: B/l clear to auscultation, no wheezes or crepitations   Abdomen: Soft, nt, nd, no guarding, rigidity or rebound; BS +   LE : No edema   Musculoskeletal: Seems to be moving all extremities equally   Neuro: No focal neurological deficits noted   Rectal tube in place with brownish liquid stool                Medications:          banatrol plus  1 packet Per Feeding Tube TID w/meals     insulin aspart  1-6 Units Subcutaneous Q4H     insulin glargine  10 Units Subcutaneous At Bedtime     levETIRAcetam  1,000 mg Oral or Feeding Tube Q12H     meropenem  2 g Intravenous Q8H     miconazole with skin protectant   Topical BID     multivitamins w/minerals  15 mL Per Feeding Tube Daily     OXcarbazepine  450 mg Oral or Feeding Tube At Bedtime     pantoprazole  40 mg Oral or Feeding Tube QAM AC     potassium & sodium phosphates  1 packet Oral or Feeding Tube Q4H     sodium chloride (PF)  10-40 mL Intracatheter Q7 Days     topiramate  50 mg Oral or Feeding Tube At Bedtime     vancomycin  125 mg Oral or Feeding Tube 4x Daily     vancomycin  1,500 mg Intravenous Q24H     PRN Meds: acetaminophen **OR** acetaminophen, artificial tears ophthalmic solution, glucose **OR** dextrose **OR** glucagon, guaiFENesin, ipratropium - albuterol 0.5 mg/2.5 mg/3 mL, - MEDICATION INSTRUCTIONS -, metoprolol, miconazole with skin protectant, naloxone **OR** naloxone **OR** naloxone **OR** naloxone, ondansetron **OR** ondansetron, prochlorperazine **OR** prochlorperazine **OR**  prochlorperazine, sodium chloride (PF), sodium chloride (PF)         Data:      All new lab and imaging data was reviewed.   Recent Labs   Lab Test 08/23/22  1920 08/21/22  0514 08/20/22  0542 07/30/22  0559 07/29/22  1414 07/28/22  0408 07/27/22  2231 07/20/22  0923 07/19/22  2255   WBC 7.4 6.6 7.7   < >  --    < > 6.8   < >  --    HGB 10.2* 9.4* 9.3*   < >  --    < > 9.6*   < >  --    MCV 84 86 84   < >  --    < > 73*   < >  --    * 638* 660*   < >  --    < > 547*   < >  --    INR  --   --   --   --  1.34*  --  1.13  --  1.36*    < > = values in this interval not displayed.      Recent Labs   Lab Test 08/24/22  0555 08/24/22  0357 08/24/22  0035 08/23/22  1627 08/23/22  1446 08/23/22  0730 08/23/22  0624 08/22/22  0809 08/22/22  0603 08/21/22  0741 08/21/22  0514     --   --   --   --   --   --   --  137  --  146*   POTASSIUM 3.8  --   --   --  3.4  --  3.2*   < > 3.4  --  3.6   CHLORIDE 108  --   --   --   --   --   --   --  106  --  115*   CO2 26  --   --   --   --   --   --   --  26  --  28   BUN 12  --   --   --   --   --   --   --  13  --  15   CR 0.33*  --   --   --   --   --   --   --  0.43*  --  0.44*   ANIONGAP 4  --   --   --   --   --   --   --  5  --  3   ADY 8.7  --   --   --   --   --   --   --  8.5  --  9.0   * 124* 94   < >  --    < >  --    < > 118*   < > 128*    < > = values in this interval not displayed.     No lab results found.    Invalid input(s): TROP, TROPONINIES

## 2022-08-24 NOTE — PLAN OF CARE
Reason for Admission: Brain abscess, ventriculitis, hydrocephalus- recovered COVID  Cognitive/Mentation: Lethargic, oriented to self.   Neuros/CMS: Stable with generalized weakness. Difficult to assess, pt doesn't follow all commands.   VS: VSS on RA   Tele: NSR  GI: Rectal tube in place, good output with small leakage around tube.   : Purewick in place, good output.   Pulmonary: LS diminished  Pain: Consistent pain, prn Tylenol given.   Drains/Lines: R PICC SL. PEG tube infusing TF. Rectal tube.   Skin: Intact with scattered bruising. Redness on buttocks/sacrum - barrier applied. Mepilex in place. T/R.   Activity: Assist x 2 with lift device.  Diet: NPO diet. TF running at goal of 50 mL/hr with q4hr 200 mL flushes. Take pills crushed in PEG tube.    Therapies recs: TCU  Discharge: Pending  Aggression Stoplight Tool: Green  End of shift summary: No changes this shift. Contact/Enteric precautions maintained for MRSA and Cdiff.

## 2022-08-24 NOTE — PHARMACY-VANCOMYCIN DOSING SERVICE
Pharmacy Vancomycin Note  Date of Service 2022  Patient's  1945   77 year old, female    Indication: Meningitis  Day of Therapy: 35  Current vancomycin regimen:  1500 mg IV q24h  Current vancomycin monitoring method: Trough (Method 2 = manual dose calculation)  Current vancomycin therapeutic monitoring goal: 15-20 mg/L    Current estimated CrCl = Estimated Creatinine Clearance: 108.9 mL/min (A) (based on SCr of 0.33 mg/dL (L)).    Creatinine for last 3 days  2022:  6:03 AM Creatinine 0.43 mg/dL  2022:  5:55 AM Creatinine 0.33 mg/dL    Recent Vancomycin Levels (past 3 days)  2022: 10:43 AM Vancomycin 14.3 mg/L    Vancomycin IV Administrations (past 72 hours)                   vancomycin 1500 mg in 0.9% NaCl 250 ml intermittent infusion 1,500 mg (mg) 1,500 mg New Bag 22 1023     1,500 mg New Bag 22 1005                Nephrotoxins and other renal medications (From now, onward)    Start     Dose/Rate Route Frequency Ordered Stop    22 1200  vancomycin 1750 mg in 0.9% NaCl 500 ml intermittent infusion 1,750 mg         1,750 mg  over 2 Hours Intravenous EVERY 24 HOURS 22 1145      08/15/22 0000  vancomycin        Note to Pharmacy: Check twice weekly creatinine and vancomycin levels. Dosing per Pharm D based on AUC       08/15/22 1505 22 2359    22 0900  vancomycin (FIRVANQ) oral solution 125 mg         125 mg Oral or Feeding Tube 4 TIMES DAILY 22 0819               Contrast Orders - past 72 hours (72h ago, onward)    None          Interpretation of levels and current regimen:  Vancomycin level is reflective of subtherapeutic level    Has serum creatinine changed greater than 50% in last 72 hours: No    Urine output:  unable to determine    Renal Function: Stable    Plan:  1. Increase Dose to 1750 mg every 24 hours    2. Vancomycin monitoring method: Trough (Method 2 = manual dose calculation)  3. Vancomycin therapeutic monitoring goal: 15-20  mg/L  4. Pharmacy will check vancomycin levels as appropriate in 1-3 Days.  5. Serum creatinine levels will be ordered daily for the first week of therapy and at least twice weekly for subsequent weeks.    Azra Durant RPH

## 2022-08-25 LAB
GLUCOSE BLDC GLUCOMTR-MCNC: 103 MG/DL (ref 70–99)
GLUCOSE BLDC GLUCOMTR-MCNC: 113 MG/DL (ref 70–99)
GLUCOSE BLDC GLUCOMTR-MCNC: 113 MG/DL (ref 70–99)
GLUCOSE BLDC GLUCOMTR-MCNC: 119 MG/DL (ref 70–99)
GLUCOSE BLDC GLUCOMTR-MCNC: 124 MG/DL (ref 70–99)
GLUCOSE BLDC GLUCOMTR-MCNC: 80 MG/DL (ref 70–99)
PHOSPHATE SERPL-MCNC: 2.8 MG/DL (ref 2.5–4.5)
POTASSIUM BLD-SCNC: 3.9 MMOL/L (ref 3.4–5.3)

## 2022-08-25 PROCEDURE — 99232 SBSQ HOSP IP/OBS MODERATE 35: CPT | Performed by: HOSPITALIST

## 2022-08-25 PROCEDURE — 258N000003 HC RX IP 258 OP 636: Performed by: INTERNAL MEDICINE

## 2022-08-25 PROCEDURE — 250N000013 HC RX MED GY IP 250 OP 250 PS 637: Performed by: HOSPITALIST

## 2022-08-25 PROCEDURE — 99232 SBSQ HOSP IP/OBS MODERATE 35: CPT | Performed by: INTERNAL MEDICINE

## 2022-08-25 PROCEDURE — 250N000011 HC RX IP 250 OP 636: Performed by: INTERNAL MEDICINE

## 2022-08-25 PROCEDURE — 250N000011 HC RX IP 250 OP 636: Performed by: HOSPITALIST

## 2022-08-25 PROCEDURE — 120N000001 HC R&B MED SURG/OB

## 2022-08-25 PROCEDURE — 84132 ASSAY OF SERUM POTASSIUM: CPT | Performed by: INTERNAL MEDICINE

## 2022-08-25 PROCEDURE — 250N000013 HC RX MED GY IP 250 OP 250 PS 637: Performed by: INTERNAL MEDICINE

## 2022-08-25 PROCEDURE — 258N000003 HC RX IP 258 OP 636: Performed by: HOSPITALIST

## 2022-08-25 PROCEDURE — 84100 ASSAY OF PHOSPHORUS: CPT | Performed by: HOSPITALIST

## 2022-08-25 PROCEDURE — 999N000190 HC STATISTIC VAT ROUNDS

## 2022-08-25 RX ADMIN — VANCOMYCIN HYDROCHLORIDE 125 MG: KIT at 17:56

## 2022-08-25 RX ADMIN — LEVETIRACETAM 1000 MG: 100 SOLUTION ORAL at 09:54

## 2022-08-25 RX ADMIN — ACETAMINOPHEN 650 MG: 325 SUSPENSION ORAL at 21:50

## 2022-08-25 RX ADMIN — Medication 15 ML: at 09:54

## 2022-08-25 RX ADMIN — MEROPENEM 2 G: 1 INJECTION, POWDER, FOR SOLUTION INTRAVENOUS at 21:34

## 2022-08-25 RX ADMIN — LEVETIRACETAM 1000 MG: 100 SOLUTION ORAL at 21:43

## 2022-08-25 RX ADMIN — VANCOMYCIN HYDROCHLORIDE 125 MG: KIT at 09:54

## 2022-08-25 RX ADMIN — MICONAZOLE NITRATE: 20 CREAM TOPICAL at 09:55

## 2022-08-25 RX ADMIN — MICONAZOLE NITRATE: 20 CREAM TOPICAL at 21:43

## 2022-08-25 RX ADMIN — ACETAMINOPHEN 650 MG: 325 SUSPENSION ORAL at 12:46

## 2022-08-25 RX ADMIN — ACETAMINOPHEN 650 MG: 325 SUSPENSION ORAL at 16:45

## 2022-08-25 RX ADMIN — VANCOMYCIN HYDROCHLORIDE 125 MG: KIT at 21:43

## 2022-08-25 RX ADMIN — Medication 1 PACKET: at 17:56

## 2022-08-25 RX ADMIN — MEROPENEM 2 G: 1 INJECTION, POWDER, FOR SOLUTION INTRAVENOUS at 13:00

## 2022-08-25 RX ADMIN — OXCARBAZEPINE 450 MG: 300 SUSPENSION ORAL at 21:43

## 2022-08-25 RX ADMIN — TOPIRAMATE 50 MG: 50 TABLET ORAL at 21:45

## 2022-08-25 RX ADMIN — Medication 1 PACKET: at 12:46

## 2022-08-25 RX ADMIN — Medication 40 MG: at 09:54

## 2022-08-25 RX ADMIN — VANCOMYCIN HYDROCHLORIDE 1750 MG: 5 INJECTION, POWDER, LYOPHILIZED, FOR SOLUTION INTRAVENOUS at 13:03

## 2022-08-25 RX ADMIN — MEROPENEM 2 G: 1 INJECTION, POWDER, FOR SOLUTION INTRAVENOUS at 04:33

## 2022-08-25 RX ADMIN — Medication 1 PACKET: at 09:54

## 2022-08-25 RX ADMIN — VANCOMYCIN HYDROCHLORIDE 125 MG: KIT at 12:46

## 2022-08-25 ASSESSMENT — ACTIVITIES OF DAILY LIVING (ADL)
ADLS_ACUITY_SCORE: 42

## 2022-08-25 NOTE — PROGRESS NOTES
CLINICAL NUTRITION SERVICES - REASSESSMENT NOTE      Malnutrition:   % Weight Loss: > 10% in 6 months (severe malnutrition) - per 8/4 RD note  % Intake:  No decreased intake noted - pt meeting needs from EN  Subcutaneous Fat Loss:  Orbital region moderate depletion - per 8/4 RD note  Muscle Loss:  Temporal region moderate depletion, Clavicle bone region moderate depletion, Acromion bone region moderate-severe depletion and Dorsal hand region severe depletion - per 8/4 RD note  Fluid Retention:  None noted    Malnutrition Diagnosis: Severe malnutrition  In Context of:  Acute illness or injury  Chronic illness or disease       EVALUATION OF PROGRESS TOWARD GOALS   Diet: NPO    Nutrition Support:   Type of Feeding Tube: 18 Fr PEG (placed 8/16)  Enteral Frequency:  Continuous  Enteral Regimen: Vital 1.5 @ 50 mL/hr  Total Enteral Provisions: 1800 cals, 82 gm pro (1.7 gm/kg), 7 gm fiber, 917 mL free water, 224 gm CHO  1 packet Banatrol TID = 120 cals, 6 gm fiber  Free Water Flush: 200 mL every 4 hrs (8/20 - MD order)    8/15: Banatrol TID = 120 cals, 6 gm pro   T: 1920 (40 cals/kg), 31 gm fiber     Liquid MVI/M daily     Tolerance:   - per chart review, no interruptions to TF documented      ASSESSED NUTRITION NEEDS:  Dosing Weight 48.3 kg (7/27 - admit wt)  Estimated Energy Needs: 0542-6371 kcals (35-40 Kcal/Kg)  Justification: repletion and underweight  Estimated Protein Needs: 70-95 grams protein (1.5-2 g pro/Kg)  Justification: repletion       NEW FINDINGS:   General: per chart review, pt does not engage in conversation    Weight: variable wts over admit but suspect d/t fluid shifts/scale variability    Labs: BGM     Medications: insulin aspart (med sliding scale insulin), insulin lantus 10 units, mulitivitamin w minerals liquid, protonix    GI: rectal tube in place - no documented output, 1x BM on 8/23      Previous Goals:   EN to meet % estimated needs  Evaluation: Met  Stooling <500 mL/day  Evaluation:  Unable to evaluate - incomplete intake/output documentation    Previous Nutrition Diagnosis:   No nutrition diagnosis identified at this time  Evaluation: No change      MALNUTRITION  % Weight Loss: > 10% in 6 months (severe malnutrition) - per 8/4 RD note  % Intake:  No decreased intake noted - pt meeting needs from EN  Subcutaneous Fat Loss:  Orbital region moderate depletion - per 8/4 RD note  Muscle Loss:  Temporal region moderate depletion, Clavicle bone region moderate depletion, Acromion bone region moderate-severe depletion and Dorsal hand region severe depletion - per 8/4 RD note  Fluid Retention:  None noted    Malnutrition Diagnosis: Severe malnutrition  In Context of:  Acute illness or injury  Chronic illness or disease    CURRENT NUTRITION DIAGNOSIS  No nutrition diagnosis identified at this time    INTERVENTIONS  Recommendations / Nutrition Prescription  - none new at this time, continue with TF and FWF as ordered    Implementation  - none new at this time    Goals  1. EN to meet % estimated needs  2. Stooling <500 mL/day      MONITORING AND EVALUATION:  Progress towards goals will be monitored and evaluated per protocol and Practice Guidelines      Jaylin Matos RD, LD

## 2022-08-25 NOTE — PROGRESS NOTES
CM Note:    Voice message left for daughter to call back to discuss discharge planning.  LTACH following for possible admission.  She will need [employer] authorization.    Nicolle Talbot RN, BSN, PHN  Inpatient Care Coordination  [facility]  Phone: [phone]

## 2022-08-25 NOTE — PROGRESS NOTES
[facility]  Hospitalist Progress Note        Brian Mcneill MD   08/25/2022        Interval History:        - seems more alert and awake, oriented to self only; follows simple commands; moving all extremities equally         Assessment and Plan:        Julisa Chaney is a 77-year-old woman with PMH of trigeminal neuralgia who was recently admitted to [facility] on 7/19 for severe sepsis (had leukocytosis, lactic acidosis and elevated procalcitonin) due to COVID-19 infection and suspected bacterial pneumonia. She was treated with 5 days of remdesivir and did not require any steroids , also treated with IV vancomycin and IV meropenem.     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  She was switched to IV vancomycin, cefepime and metronidazole and transferred to [facility] for neurosurgery assessment. MRI brain w/wo contrast 7/30 showed ventriculitis with probable abscess. Neurosurgery/neurology/infectious disease/oncology consulted. Patient taken to the OR on 7/31 for left frontal ventriculostomy.   Since then has remained on broad spectrum antibiotics.  In addition she has developed C. Difficile colitis and is on treatment.  She has continued to have a significant encephalopathy.     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  - flow cytometry was done which did not show any evidence of malignancy.  - s/p Left frontal ventriculostomy on 7/31/2022, EVD removed 8/8; CSF cultures have remained negative; CSF counts decreasing 469---233 ---56 (last CSF from 8/18)  - last head CT  from 8/19 with stable presumed vasogenic edema in the white matter of the anterior inferior left frontal lobe related to ventriculitis of the left lateral ventricle; stable ventriculomegaly of the left lateral ventricle  - neurosurgery signed off 8/24  - ID following  - remains afebrile; wbc 21--->leukocytosis resolved ; was initially on vancomycin, cefepime, and metronidazole-- then Meropenem and Vancomycin; ID following, plan for 6 weeks treatment until 9/8/22     Acute infectious encephalopathy due to above:  - mentation improved since admission but no significant progress; does not engane in full conversation; oriented to self only; follows simple commands; moving all extremities equally    -Continue to treat underlying issues as noted above.    -Maintain normal sleep/wake cycle as able.  - 8/23 discontinued prn benadryl, narcotics     C. difficile colitis:  Patient was noted to have copious diarrhea on 8/1 and was noted positive for C. Difficile.  -Continue oral vancomycin.  Will need a prolonged treatment given other abx use.  - continue Rectal tube   -ID following as noted above.     Acute on chronic anemia:  - Prior labs show baseline hemoglobin of about 9-10.   - Hb on admission was 11.7, slowly trended down, got 1 unit PRBC on 8/8/22 for Hb 6.9  - No obvious ongoing bleeding noted; Hb hs now remained stable around 9 to 9.5  - iron panel on 7/30/22 showed that her total iron was low at 14, binding capacity was low at 28, iron saturation was 6 and B12 was high and folate was normal  - received IV iron infusion x3 with first on 8/11/22.  -Continue to monitor hgb      History of trigeminal neuralgia  - Continue PTA Trileptal , Keppra and Topamax.     History of chronic pyloric ulcer  She had EGD done in 2020 which showed gastroduodenitis with a duodenal stricture.  - Continue PPI.     Hyperglycemia  Recent HbA1c was 5.6 on 7/19/22. Was not on any medication prior to admission.  - Continue medium dose sliding  scale insulin every 4 hours while on tube feeds  - started on Lantus 10 units bedtime, continue .      Intermittent Hypokalemia, Hypo/hypernatremia, Hypophosphatemia:  - Recheck and replace per protocol.  - Continue to follow trend  - hypernatremia improved; continue free water flushes 200 ml  every 4 hrs     Severe protein calorie malnutrition:  - Nutrition following for tube feeds via G tube (placed 8/16) ; getting free water flushes  - Will need SLP consult when more able to participate     Physical deconditioning:  - will need TCU/ LTC placement; currently unable to participate with therapy  - discussed with SW to consider LTACH placement  - SW following for disposition    Diet: NPO for Medical/Clinical Reasons Except for: Other; Specify: NPO For oral intake and gastric feedings for 6 hours post insertion Gastrostomy G/GJ tube. May feed through Jejunal or Distal PORT immediately for GJ tubes ONLY.  Adult Formula Drip Feeding: Continuous Vital 1.5; Gastrostomy; Goal Rate: 50; mL/hr; Medication - Feeding Tube Flush Frequency: At least 15-30 mL water before and after medication administration and with tube clogging; Amount to Send (Nutrition us...      DVT Prophylaxis: Pneumatic Compression Devices    Central Lines: PRESENT  PICC Triple Lumen 07/22/22 Right Basilic Vascular access-Site Assessment: L    Code Status: Full Code          Disposition Plan    Expected Discharge Date: unclear at this time; pending clinical improvement  - planned for TCU; might need LTACH      Clinically Significant Risk Factors Present on Admission                         Page Me (7 am to 6 pm)              Physical Exam:      Blood pressure (!) 142/63, pulse 99, temperature 98  F (36.7  C), temperature source Oral, resp. rate 15, weight 48.3 kg (106 lb 7.7 oz), SpO2 98 %, not currently breastfeeding.  Vitals:    08/22/22 0933 08/24/22 0629 08/25/22 0610   Weight: 56 kg (123 lb 7.3 oz) 48.3 kg (106 lb 7.7 oz) 48.3 kg (106 lb 7.7 oz)      Vital Signs with Ranges  Temp:  [97  F (36.1  C)-98.6  F (37  C)] 98  F (36.7  C)  Pulse:  [76-99] 99  Resp:  [15-18] 15  BP: (102-142)/(54-85) 142/63  SpO2:  [96 %-98 %] 98 %  I/O's Last 24 hours  I/O last 3 completed shifts:  In: 1400 [NG/GT:200]  Out: 2600 [Urine:2600]    Constitutional:  oriented to self only; follows simple commands; moving all extremities equally; in no apparent distress   HEENT: Pupils equal and reactive to light and accomodation, neck supple    Oral cavity: Dry oral mucosa   Cardiovascular: Normal s1 s2, regular rate and rhythm, no murmur   Lungs: B/l clear to auscultation, no wheezes or crepitations   Abdomen: Soft, nt, nd, no guarding, rigidity or rebound; BS +   LE : No edema   Musculoskeletal: moving all extremities equally   Neuro: No focal neurological deficits noted   Rectal tube in place with brownish liquid stool                Medications:          banatrol plus  1 packet Per Feeding Tube TID w/meals     insulin aspart  1-6 Units Subcutaneous Q4H     insulin glargine  10 Units Subcutaneous At Bedtime     levETIRAcetam  1,000 mg Oral or Feeding Tube Q12H     meropenem  2 g Intravenous Q8H     miconazole with skin protectant   Topical BID     multivitamins w/minerals  15 mL Per Feeding Tube Daily     OXcarbazepine  450 mg Oral or Feeding Tube At Bedtime     pantoprazole  40 mg Oral or Feeding Tube QAM AC     sodium chloride (PF)  10-40 mL Intracatheter Q7 Days     topiramate  50 mg Oral or Feeding Tube At Bedtime     vancomycin  125 mg Oral or Feeding Tube 4x Daily     vancomycin  1,750 mg Intravenous Q24H     PRN Meds: acetaminophen **OR** acetaminophen, artificial tears ophthalmic solution, glucose **OR** dextrose **OR** glucagon, guaiFENesin, ipratropium - albuterol 0.5 mg/2.5 mg/3 mL, - MEDICATION INSTRUCTIONS -, metoprolol, miconazole with skin protectant, naloxone **OR** naloxone **OR** naloxone **OR** naloxone, ondansetron **OR** ondansetron, prochlorperazine **OR**  prochlorperazine **OR** prochlorperazine, sodium chloride (PF), sodium chloride (PF)         Data:      All new lab and imaging data was reviewed.   Recent Labs   Lab Test 08/23/22  1920 08/21/22  0514 08/20/22  0542 07/30/22  0559 07/29/22  1414 07/28/22  0408 07/27/22  2231 07/20/22  0923 07/19/22  2255   WBC 7.4 6.6 7.7   < >  --    < > 6.8   < >  --    HGB 10.2* 9.4* 9.3*   < >  --    < > 9.6*   < >  --    MCV 84 86 84   < >  --    < > 73*   < >  --    * 638* 660*   < >  --    < > 547*   < >  --    INR  --   --   --   --  1.34*  --  1.13  --  1.36*    < > = values in this interval not displayed.      Recent Labs   Lab Test 08/25/22  0546 08/25/22  0412 08/25/22  0007 08/24/22  2026 08/24/22  0809 08/24/22  0555 08/23/22  1627 08/23/22  1446 08/22/22  0809 08/22/22  0603 08/21/22  0741 08/21/22  0514   NA  --   --   --   --   --  138  --   --   --  137  --  146*   POTASSIUM 3.9  --   --   --   --  3.8  --  3.4   < > 3.4  --  3.6   CHLORIDE  --   --   --   --   --  108  --   --   --  106  --  115*   CO2  --   --   --   --   --  26  --   --   --  26  --  28   BUN  --   --   --   --   --  12  --   --   --  13  --  15   CR  --   --   --   --   --  0.33*  --   --   --  0.43*  --  0.44*   ANIONGAP  --   --   --   --   --  4  --   --   --  5  --  3   ADY  --   --   --   --   --  8.7  --   --   --  8.5  --  9.0   GLC  --  113* 119* 120*   < > 124*   < >  --    < > 118*   < > 128*    < > = values in this interval not displayed.     No lab results found.    Invalid input(s): TROP, TROPONINIES

## 2022-08-25 NOTE — PROGRESS NOTES
Patient is alert to self, vss, a-febrile, tylenol given x 1 for discomfort, patient was able to follow some commands t,  she is able to move upper extremities, unable to move lower extremities, tylenol given x 2 for pain, turn and reposition Q 2 hrs, incontinent of B&B, pure wick in place with good urine output, rectal tube in place and patent, TF running at 50 ML goal with 200 ML water flush Q 4 hrs, neurosurgery following.

## 2022-08-25 NOTE — PLAN OF CARE
Reason for Admission: Cerebral abscess      Cognitive/Mentation: A/Ox 1 self  Neuros/CMS: Intact ex inconsistent with commands, moves upper extremities but not lower during assessments  VS: stable. BP under 180   GI: BS active, Incontinent. Rectal tube for C-diff  : Incontinent. Purewick  Pulmonary: LS clear.  Pain: states having back pain. Resolved with tylenol.   Drains/Lines: PICC  Skin: scattered bruising and rash on khushbu area. Cream applied. Foam dressing on coccyx.   Activity: Assist x 2 with lift.  Diet: NPO. PEG running at goal of 50mL/hr with 200mL Q4 flush. Takes pills crushed in PEG.     Therapies recs: TCU  Discharge: pending    Aggression Stoplight Tool: green

## 2022-08-25 NOTE — PROGRESS NOTES
[facility]    Infectious Disease Progress Note    Date of Service : 08/25/2022        Assessment:  Patient transferred 7/27 from [facility] for concern for brain abscess (recovered COVID) - MRI suggestive of ventriculitis with left lateral ventricle abscess. Has been on broad spectrum antibiotics, now on Meropenem/Vancomycin, mental status improved, s/p left frontal ventriculostomy  CSF cxs have remained negative. Course complicated by development of C.diff colitis. Now has ongoing headache but CSF cell count has improved and appears more lymphocytic . ? CSF leak     Recommendations  1. Continue current antimicrobial therapy Vancomycin, Meropenem.  6 week treatment planned until 9/8  2. Fall cxs have remained negative. Mental status has improved, remains afebrile , Leukocytosis has resolved, CRP improved and CSF cell count is improving all suggestive of improvement.       Cayetano Schmitt MD    Interval History   Does not complain of headache,  CSF appears improved, patient has consistently declined MRI scans, Neurosurgery is following  Afebrile     Physical Exam   Temp: 98  F (36.7  C) Temp src: Oral BP: (!) 142/63 Pulse: 99   Resp: 15 SpO2: 98 % O2 Device: None (Room air)    Vitals:    08/22/22 0933 08/24/22 0629 08/25/22 0610   Weight: 56 kg (123 lb 7.3 oz) 48.3 kg (106 lb 7.7 oz) 48.3 kg (106 lb 7.7 oz)     Vital Signs with Ranges  Temp:  [97  F (36.1  C)-98.6  F (37  C)] 98  F (36.7  C)  Pulse:  [76-99] 99  Resp:  [15-18] 15  BP: (102-142)/(54-85) 142/63  SpO2:  [96 %-98 %] 98 %    Constitutional:awake, does not complaint of headache today , responds appropriately, moves all extremities  Lungs: Clear to auscultation bilaterally, no crackles or wheezing  Cardiovascular: Regular rate and rhythm, normal S1 and S2, and no murmur noted  Abdomen: soft, G tube in place  Skin: No rash    Other:    Medications     - MEDICATION INSTRUCTIONS -         banatrol plus  1 packet Per Feeding Tube TID  w/meals     insulin aspart  1-6 Units Subcutaneous Q4H     insulin glargine  10 Units Subcutaneous At Bedtime     levETIRAcetam  1,000 mg Oral or Feeding Tube Q12H     meropenem  2 g Intravenous Q8H     miconazole with skin protectant   Topical BID     multivitamins w/minerals  15 mL Per Feeding Tube Daily     OXcarbazepine  450 mg Oral or Feeding Tube At Bedtime     pantoprazole  40 mg Oral or Feeding Tube QAM AC     sodium chloride (PF)  10-40 mL Intracatheter Q7 Days     topiramate  50 mg Oral or Feeding Tube At Bedtime     vancomycin  125 mg Oral or Feeding Tube 4x Daily     vancomycin  1,750 mg Intravenous Q24H       Data   All microbiology laboratory data reviewed.  Recent Labs   Lab Test 08/23/22  1920 08/21/22  0514 08/20/22  0542   WBC 7.4 6.6 7.7   HGB 10.2* 9.4* 9.3*   HCT 35.5 33.6* 32.9*   MCV 84 86 84   * 638* 660*     Recent Labs   Lab Test 08/24/22  0555 08/22/22  0603 08/21/22  0514   CR 0.33* 0.43* 0.44*     Recent Labs   Lab Test 08/08/20  0212   SED 13

## 2022-08-25 NOTE — PLAN OF CARE
Goal Outcome Evaluation:          Overall Patient Progress: no change    Outcome Evaluation: TF running, no documented interupptions. unable to evaluate stooling with incomplete outputs documented. no new changes.    Jaylin Matos RD, LD

## 2022-08-26 LAB
ANION GAP SERPL CALCULATED.3IONS-SCNC: 2 MMOL/L (ref 3–14)
BASOPHILS # BLD AUTO: 0.1 10E3/UL (ref 0–0.2)
BASOPHILS NFR BLD AUTO: 2 %
BUN SERPL-MCNC: 12 MG/DL (ref 7–30)
CALCIUM SERPL-MCNC: 8.6 MG/DL (ref 8.5–10.1)
CHLORIDE BLD-SCNC: 107 MMOL/L (ref 94–109)
CO2 SERPL-SCNC: 29 MMOL/L (ref 20–32)
CREAT SERPL-MCNC: 0.4 MG/DL (ref 0.52–1.04)
EOSINOPHIL # BLD AUTO: 0.8 10E3/UL (ref 0–0.7)
EOSINOPHIL NFR BLD AUTO: 12 %
ERYTHROCYTE [DISTWIDTH] IN BLOOD BY AUTOMATED COUNT: 26.2 % (ref 10–15)
GFR SERPL CREATININE-BSD FRML MDRD: >90 ML/MIN/1.73M2
GLUCOSE BLD-MCNC: 95 MG/DL (ref 70–99)
GLUCOSE BLDC GLUCOMTR-MCNC: 103 MG/DL (ref 70–99)
GLUCOSE BLDC GLUCOMTR-MCNC: 106 MG/DL (ref 70–99)
GLUCOSE BLDC GLUCOMTR-MCNC: 111 MG/DL (ref 70–99)
GLUCOSE BLDC GLUCOMTR-MCNC: 114 MG/DL (ref 70–99)
GLUCOSE BLDC GLUCOMTR-MCNC: 126 MG/DL (ref 70–99)
GLUCOSE BLDC GLUCOMTR-MCNC: 139 MG/DL (ref 70–99)
HCT VFR BLD AUTO: 36.1 % (ref 35–47)
HGB BLD-MCNC: 10.5 G/DL (ref 11.7–15.7)
IMM GRANULOCYTES # BLD: 0.1 10E3/UL
IMM GRANULOCYTES NFR BLD: 1 %
LYMPHOCYTES # BLD AUTO: 1.5 10E3/UL (ref 0.8–5.3)
LYMPHOCYTES NFR BLD AUTO: 23 %
MCH RBC QN AUTO: 24.5 PG (ref 26.5–33)
MCHC RBC AUTO-ENTMCNC: 29.1 G/DL (ref 31.5–36.5)
MCV RBC AUTO: 84 FL (ref 78–100)
MONOCYTES # BLD AUTO: 0.8 10E3/UL (ref 0–1.3)
MONOCYTES NFR BLD AUTO: 13 %
NEUTROPHILS # BLD AUTO: 3.1 10E3/UL (ref 1.6–8.3)
NEUTROPHILS NFR BLD AUTO: 49 %
NRBC # BLD AUTO: 0 10E3/UL
NRBC BLD AUTO-RTO: 0 /100
PHOSPHATE SERPL-MCNC: 2.4 MG/DL (ref 2.5–4.5)
PLATELET # BLD AUTO: 533 10E3/UL (ref 150–450)
POTASSIUM BLD-SCNC: 3.8 MMOL/L (ref 3.4–5.3)
RBC # BLD AUTO: 4.29 10E6/UL (ref 3.8–5.2)
SODIUM SERPL-SCNC: 138 MMOL/L (ref 133–144)
WBC # BLD AUTO: 6.4 10E3/UL (ref 4–11)

## 2022-08-26 PROCEDURE — 250N000013 HC RX MED GY IP 250 OP 250 PS 637: Performed by: HOSPITALIST

## 2022-08-26 PROCEDURE — 250N000011 HC RX IP 250 OP 636: Performed by: HOSPITALIST

## 2022-08-26 PROCEDURE — 99232 SBSQ HOSP IP/OBS MODERATE 35: CPT | Performed by: HOSPITALIST

## 2022-08-26 PROCEDURE — 258N000003 HC RX IP 258 OP 636: Performed by: INTERNAL MEDICINE

## 2022-08-26 PROCEDURE — 250N000013 HC RX MED GY IP 250 OP 250 PS 637: Performed by: INTERNAL MEDICINE

## 2022-08-26 PROCEDURE — 999N000040 HC STATISTIC CONSULT NO CHARGE VASC ACCESS

## 2022-08-26 PROCEDURE — 84100 ASSAY OF PHOSPHORUS: CPT | Performed by: INTERNAL MEDICINE

## 2022-08-26 PROCEDURE — 258N000003 HC RX IP 258 OP 636: Performed by: HOSPITALIST

## 2022-08-26 PROCEDURE — 99232 SBSQ HOSP IP/OBS MODERATE 35: CPT | Performed by: INTERNAL MEDICINE

## 2022-08-26 PROCEDURE — 80048 BASIC METABOLIC PNL TOTAL CA: CPT | Performed by: HOSPITALIST

## 2022-08-26 PROCEDURE — 85004 AUTOMATED DIFF WBC COUNT: CPT | Performed by: HOSPITALIST

## 2022-08-26 PROCEDURE — 250N000011 HC RX IP 250 OP 636: Performed by: INTERNAL MEDICINE

## 2022-08-26 PROCEDURE — 120N000001 HC R&B MED SURG/OB

## 2022-08-26 RX ORDER — QUETIAPINE FUMARATE 25 MG/1
25 TABLET, FILM COATED ORAL ONCE
Status: COMPLETED | OUTPATIENT
Start: 2022-08-26 | End: 2022-08-26

## 2022-08-26 RX ORDER — QUETIAPINE FUMARATE 25 MG/1
25 TABLET, FILM COATED ORAL
Status: COMPLETED | OUTPATIENT
Start: 2022-08-26 | End: 2022-08-28

## 2022-08-26 RX ADMIN — OXCARBAZEPINE 450 MG: 300 SUSPENSION ORAL at 22:22

## 2022-08-26 RX ADMIN — POTASSIUM & SODIUM PHOSPHATES POWDER PACK 280-160-250 MG 1 PACKET: 280-160-250 PACK at 16:41

## 2022-08-26 RX ADMIN — POTASSIUM & SODIUM PHOSPHATES POWDER PACK 280-160-250 MG 1 PACKET: 280-160-250 PACK at 12:42

## 2022-08-26 RX ADMIN — MEROPENEM 2 G: 1 INJECTION, POWDER, FOR SOLUTION INTRAVENOUS at 20:17

## 2022-08-26 RX ADMIN — VANCOMYCIN HYDROCHLORIDE 125 MG: KIT at 13:32

## 2022-08-26 RX ADMIN — MICONAZOLE NITRATE: 20 CREAM TOPICAL at 20:30

## 2022-08-26 RX ADMIN — VANCOMYCIN HYDROCHLORIDE 125 MG: KIT at 18:28

## 2022-08-26 RX ADMIN — VANCOMYCIN HYDROCHLORIDE 125 MG: KIT at 10:37

## 2022-08-26 RX ADMIN — LEVETIRACETAM 1000 MG: 100 SOLUTION ORAL at 10:37

## 2022-08-26 RX ADMIN — Medication 1 PACKET: at 18:28

## 2022-08-26 RX ADMIN — MEROPENEM 2 G: 1 INJECTION, POWDER, FOR SOLUTION INTRAVENOUS at 05:10

## 2022-08-26 RX ADMIN — TOPIRAMATE 50 MG: 50 TABLET ORAL at 22:22

## 2022-08-26 RX ADMIN — VANCOMYCIN HYDROCHLORIDE 125 MG: KIT at 23:21

## 2022-08-26 RX ADMIN — LEVETIRACETAM 1000 MG: 100 SOLUTION ORAL at 20:18

## 2022-08-26 RX ADMIN — QUETIAPINE FUMARATE 25 MG: 25 TABLET ORAL at 02:01

## 2022-08-26 RX ADMIN — ACETAMINOPHEN 650 MG: 325 SUSPENSION ORAL at 16:41

## 2022-08-26 RX ADMIN — Medication 15 ML: at 10:37

## 2022-08-26 RX ADMIN — MEROPENEM 2 G: 1 INJECTION, POWDER, FOR SOLUTION INTRAVENOUS at 12:18

## 2022-08-26 RX ADMIN — Medication 1 PACKET: at 10:37

## 2022-08-26 RX ADMIN — Medication 40 MG: at 10:37

## 2022-08-26 RX ADMIN — POTASSIUM & SODIUM PHOSPHATES POWDER PACK 280-160-250 MG 1 PACKET: 280-160-250 PACK at 20:18

## 2022-08-26 RX ADMIN — Medication 1 PACKET: at 13:40

## 2022-08-26 RX ADMIN — VANCOMYCIN HYDROCHLORIDE 1750 MG: 5 INJECTION, POWDER, LYOPHILIZED, FOR SOLUTION INTRAVENOUS at 13:32

## 2022-08-26 RX ADMIN — MICONAZOLE NITRATE: 20 CREAM TOPICAL at 10:47

## 2022-08-26 ASSESSMENT — ACTIVITIES OF DAILY LIVING (ADL)
ADLS_ACUITY_SCORE: 42
ADLS_ACUITY_SCORE: 38
ADLS_ACUITY_SCORE: 42
ADLS_ACUITY_SCORE: 42
ADLS_ACUITY_SCORE: 38
ADLS_ACUITY_SCORE: 42
ADLS_ACUITY_SCORE: 38
ADLS_ACUITY_SCORE: 42

## 2022-08-26 NOTE — PROGRESS NOTES
[facility]  Hospitalist Progress Note        Brian Mcneill MD   08/26/2022        Interval History:        - was given a dose of seroquel last night for anxiety, difficulty sleeping  - mentation slightly better, oriented to place and person         Assessment and Plan:        Julisa Chaney is a 77-year-old woman with PMH of trigeminal neuralgia who was recently admitted to [facility] on 7/19 for severe sepsis (had leukocytosis, lactic acidosis and elevated procalcitonin) due to COVID-19 infection and suspected bacterial pneumonia. She was treated with 5 days of remdesivir and did not require any steroids , also treated with IV vancomycin and IV meropenem.     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  She was switched to IV vancomycin, cefepime and metronidazole and transferred to [facility] for neurosurgery assessment. MRI brain w/wo contrast 7/30 showed ventriculitis with probable abscess. Neurosurgery/neurology/infectious disease/oncology consulted. Patient taken to the OR on 7/31 for left frontal ventriculostomy.   Since then has remained on broad spectrum antibiotics.  In addition she has developed C. Difficile colitis and is on treatment.  She has continued to have a significant encephalopathy.     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  - flow cytometry was done which did not show any evidence of malignancy.  - s/p Left frontal ventriculostomy on 7/31/2022, EVD removed 8/8; CSF cultures have remained negative; CSF counts decreasing 469---233 ---56 (last CSF from  8/18)  - last head CT from 8/19 with stable presumed vasogenic edema in the white matter of the anterior inferior left frontal lobe related to ventriculitis of the left lateral ventricle; stable ventriculomegaly of the left lateral ventricle  - neurosurgery signed off 8/24  - ID following  - remains afebrile; wbc 21--->leukocytosis resolved ; was initially on vancomycin, cefepime, and metronidazole-- then Meropenem and Vancomycin; ID following, plan for 6 weeks treatment until 9/8/22     Acute infectious encephalopathy due to above:  - mentation improved since admission but no significant progress; does not engane in full conversation; oriented to self and place at times; follows simple commands; moving all extremities equally    -Continue to treat underlying issues as noted above.    -Maintain normal sleep/wake cycle as able.  - 8/23 discontinued prn benadryl, narcotics     C. difficile colitis:  Patient was noted to have copious diarrhea on 8/1 and was noted positive for C. Difficile.  -Continue oral vancomycin.  Will need a prolonged treatment given other abx use.  - continue Rectal tube   -ID following as noted above.     Acute on chronic anemia:  - Prior labs show baseline hemoglobin of about 9-10.   - Hb on admission was 11.7, slowly trended down, got 1 unit PRBC on 8/8/22 for Hb 6.9  - No obvious ongoing bleeding noted; Hb hs now remained stable around 9 to 10  - iron panel on 7/30/22 showed that her total iron was low at 14, binding capacity was low at 28, iron saturation was 6 and B12 was high and folate was normal  - received IV iron infusion x3 with first on 8/11/22.  -Continue to monitor hgb      History of trigeminal neuralgia  - Continue PTA Trileptal , Keppra and Topamax.     History of chronic pyloric ulcer  She had EGD done in 2020 which showed gastroduodenitis with a duodenal stricture.  - Continue PPI.     Hyperglycemia  Recent HbA1c was 5.6 on 7/19/22. Was not on any medication prior to  admission.  - Continue medium dose sliding scale insulin every 4 hours while on tube feeds  - started on Lantus 10 units bedtime, continue .      Intermittent Hypokalemia, Hypo/hypernatremia, Hypophosphatemia:  - Recheck and replace per protocol.  - Continue to follow trend  - hypernatremia improved; continue free water flushes 200 ml  every 4 hrs     Severe protein calorie malnutrition:  - Nutrition following for tube feeds via G tube (placed 8/16) ; getting free water flushes  - Will need SLP consult when more able to participate     Physical deconditioning:  - will need TCU/ LTC placement; currently unable to participate with therapy  - discussed with SW to consider LTACH placement  - SW following for disposition    Diet: NPO for Medical/Clinical Reasons Except for: Other; Specify: NPO For oral intake and gastric feedings for 6 hours post insertion Gastrostomy G/GJ tube. May feed through Jejunal or Distal PORT immediately for GJ tubes ONLY.  Adult Formula Drip Feeding: Continuous Vital 1.5; Gastrostomy; Goal Rate: 50; mL/hr; Medication - Feeding Tube Flush Frequency: At least 15-30 mL water before and after medication administration and with tube clogging; Amount to Send (Nutrition us...      DVT Prophylaxis: Pneumatic Compression Devices    Central Lines: PRESENT  PICC Triple Lumen 07/22/22 Right Basilic Vascular access-Site Assessment: WDL    Code Status: Full Code          Disposition Plan    Expected Discharge Date: unclear at this time; pending clinical improvement  - planned for TCU; might need LTACH; SW following for disposition    Clinically Significant Risk Factors Present on Admission                         Page Me (7 am to 6 pm)              Physical Exam:      Blood pressure 121/59, pulse 86, temperature 97  F (36.1  C), temperature source Oral, resp. rate 18, weight 48.3 kg (106 lb 7.7 oz), SpO2 97 %, not currently breastfeeding.  Vitals:    08/22/22 0933 08/24/22 0629 08/25/22 0610   Weight: 56 kg  (123 lb 7.3 oz) 48.3 kg (106 lb 7.7 oz) 48.3 kg (106 lb 7.7 oz)     Vital Signs with Ranges  Temp:  [97  F (36.1  C)-98.2  F (36.8  C)] 97  F (36.1  C)  Pulse:  [74-99] 86  Resp:  [15-18] 18  BP: (118-142)/(56-63) 121/59  SpO2:  [94 %-100 %] 97 %  I/O's Last 24 hours  I/O last 3 completed shifts:  In: -   Out: 1400 [Urine:1200; Stool:200]    Constitutional:  oriented to self and place; follows simple commands; moving all extremities equally; in no apparent distress   HEENT: Pupils equal and reactive to light and accomodation, neck supple    Oral cavity: Dry oral mucosa   Cardiovascular: Normal s1 s2, regular rate and rhythm, no murmur   Lungs: B/l clear to auscultation, no wheezes or crepitations   Abdomen: Soft, nt, nd, no guarding, rigidity or rebound; BS +   LE : No edema   Musculoskeletal: moving all extremities equally   Neuro: No focal neurological deficits noted   Rectal tube in place with brownish liquid stool                Medications:          banatrol plus  1 packet Per Feeding Tube TID w/meals     insulin aspart  1-6 Units Subcutaneous Q4H     insulin glargine  10 Units Subcutaneous At Bedtime     levETIRAcetam  1,000 mg Oral or Feeding Tube Q12H     meropenem  2 g Intravenous Q8H     miconazole with skin protectant   Topical BID     multivitamins w/minerals  15 mL Per Feeding Tube Daily     OXcarbazepine  450 mg Oral or Feeding Tube At Bedtime     pantoprazole  40 mg Oral or Feeding Tube QAM AC     sodium chloride (PF)  10-40 mL Intracatheter Q7 Days     topiramate  50 mg Oral or Feeding Tube At Bedtime     vancomycin  125 mg Oral or Feeding Tube 4x Daily     vancomycin  1,750 mg Intravenous Q24H     PRN Meds: acetaminophen **OR** acetaminophen, artificial tears ophthalmic solution, glucose **OR** dextrose **OR** glucagon, guaiFENesin, ipratropium - albuterol 0.5 mg/2.5 mg/3 mL, - MEDICATION INSTRUCTIONS -, metoprolol, miconazole with skin protectant, naloxone **OR** naloxone **OR** naloxone **OR**  naloxone, ondansetron **OR** ondansetron, prochlorperazine **OR** prochlorperazine **OR** prochlorperazine, sodium chloride (PF), sodium chloride (PF)         Data:      All new lab and imaging data was reviewed.   Recent Labs   Lab Test 08/26/22  0557 08/23/22  1920 08/21/22  0514 07/30/22  0559 07/29/22  1414 07/28/22  0408 07/27/22  2231 07/20/22  0923 07/19/22  2255   WBC 6.4 7.4 6.6   < >  --    < > 6.8   < >  --    HGB 10.5* 10.2* 9.4*   < >  --    < > 9.6*   < >  --    MCV 84 84 86   < >  --    < > 73*   < >  --    * 553* 638*   < >  --    < > 547*   < >  --    INR  --   --   --   --  1.34*  --  1.13  --  1.36*    < > = values in this interval not displayed.      Recent Labs   Lab Test 08/26/22  0557 08/26/22  0400 08/26/22  0047 08/25/22  0829 08/25/22  0546 08/24/22  0809 08/24/22  0555 08/22/22  0809 08/22/22  0603     --   --   --   --   --  138  --  137   POTASSIUM 3.8  --   --   --  3.9  --  3.8   < > 3.4   CHLORIDE 107  --   --   --   --   --  108  --  106   CO2 29  --   --   --   --   --  26  --  26   BUN 12  --   --   --   --   --  12  --  13   CR 0.40*  --   --   --   --   --  0.33*  --  0.43*   ANIONGAP 2*  --   --   --   --   --  4  --  5   ADY 8.6  --   --   --   --   --  8.7  --  8.5   GLC 95 126* 139*   < >  --    < > 124*   < > 118*    < > = values in this interval not displayed.     No lab results found.    Invalid input(s): TROP, TROPONINIES

## 2022-08-26 NOTE — PROVIDER NOTIFICATION
"MD Notification    Notified Person: Baron    Notification Date/Time: 8/26/2022 12:57 AM     Notification Interaction: paged    Purpose of Notification: \"Pt here with brain mass, confused, total care, VSS, c/o anxiety/ difficulty falling asleep, wondering if there a PRN for anxiety/ sleep could be ordered for her?\"    Orders Received: one time dose for Seroquel     Comments: medication given and patient was able to fall asleep       "

## 2022-08-26 NOTE — PROGRESS NOTES
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 Has been on broad spectrum antibiotics, now on Meropenem/Vancomycin, mental status improved, s/p left frontal ventriculostomy  CSF cxs have remained negative. Course complicated by development of C.diff colitis.      Recommendations  1. Continue current antimicrobial therapy Vancomycin, Meropenem.  6 week treatment planned until 9/8  2. Fall cxs have remained negative. Mental status has improved, remains afebrile , Leukocytosis has resolved, CRP improved and CSF cell count is improving all suggestive of improvement.       Cayetano Schmitt MD    Interval History   Does not complain of headache,  CSF appears improved, patient has consistently declined MRI scans, Neurosurgery is following  Afebrile     Physical Exam   Temp: 97  F (36.1  C) Temp src: Oral BP: 121/59 Pulse: 86   Resp: 18 SpO2: 97 % O2 Device: None (Room air)    Vitals:    08/22/22 0933 08/24/22 0629 08/25/22 0610   Weight: 56 kg (123 lb 7.3 oz) 48.3 kg (106 lb 7.7 oz) 48.3 kg (106 lb 7.7 oz)     Vital Signs with Ranges  Temp:  [97  F (36.1  C)-98.2  F (36.8  C)] 97  F (36.1  C)  Pulse:  [74-86] 86  Resp:  [16-18] 18  BP: (118-130)/(56-63) 121/59  SpO2:  [94 %-100 %] 97 %    Constitutional:awake, does not complaint of headache today , responds appropriately, moves all extremities  Lungs: Clear to auscultation bilaterally, no crackles or wheezing  Cardiovascular: Regular rate and rhythm, normal S1 and S2, and no murmur noted  Abdomen: soft, G tube in place  Skin: No rash    Other:    Medications     - MEDICATION INSTRUCTIONS -         banatrol plus  1 packet Per Feeding Tube TID w/meals     insulin aspart  1-6 Units Subcutaneous Q4H     insulin glargine  10 Units Subcutaneous At  Bedtime     levETIRAcetam  1,000 mg Oral or Feeding Tube Q12H     meropenem  2 g Intravenous Q8H     miconazole with skin protectant   Topical BID     multivitamins w/minerals  15 mL Per Feeding Tube Daily     OXcarbazepine  450 mg Oral or Feeding Tube At Bedtime     pantoprazole  40 mg Oral or Feeding Tube QAM AC     sodium chloride (PF)  10-40 mL Intracatheter Q7 Days     topiramate  50 mg Oral or Feeding Tube At Bedtime     vancomycin  125 mg Oral or Feeding Tube 4x Daily     vancomycin  1,750 mg Intravenous Q24H       Data   All microbiology laboratory data reviewed.  Recent Labs   Lab Test 08/26/22  0557 08/23/22  1920 08/21/22  0514   WBC 6.4 7.4 6.6   HGB 10.5* 10.2* 9.4*   HCT 36.1 35.5 33.6*   MCV 84 84 86   * 553* 638*     Recent Labs   Lab Test 08/26/22  0557 08/24/22  0555 08/22/22  0603   CR 0.40* 0.33* 0.43*     Recent Labs   Lab Test 08/08/20  0212   SED 13

## 2022-08-26 NOTE — PLAN OF CARE
Goal Outcome Evaluation:    Plan of Care Reviewed With: patient     Overall Patient Progress: no change         Reason for Admission: Cerebral Abscess     Cognitive/Mentation: A/Ox to self  Neuros/CMS: Intact ex difficult to assess d/t inconsistent command following and weakness, lethargic, face symmetrical at rest, SHREYAS shoulder shrug, tongue deviation, drift, BUE 3/5 strength, BLE 2/5 strength, SHREYAS Ataxia  VS: VSS.  GI: BS +, passing flatus, last BM 8/26, diarrhea, rectal tube in place. Incontinent.  : Pure wick in place, voiding adequate amounts. Incontinent.  Pulmonary: LS clear/ equal.  Pain: c/o headache, given tylenol for relief.     Drains/Lines: PICC on LUE, SL, unit draw  Skin: Intact ex breakdown on coccyx and around rectum, care provided per orders   Activity: Assist x 2 with lift.  Diet: NPO with TF infusing Takes pills crushed through the tube .     Therapies recs: TCU  Discharge: pending    Aggression Stoplight Tool: green    End of shift summary: Pt stable throughout this shift, no changes noted in neuro status   C/O anxiety around 0200, pageainsley NAVA (see note) Seroquel helped

## 2022-08-26 NOTE — PROVIDER NOTIFICATION
Brief update:    Paged regarding confusion, difficulty sleeping, patient requesting medication    Added 25 mg Seroquel x1  Assess for response    Brody Baron MD  1:50 AM

## 2022-08-26 NOTE — CONSULTS
Care Management Initial Consult    General Information  Assessment completed with: Patient, Children, Alissa, daughter  Type of CM/SW Visit: Initial Assessment    Primary Care Provider verified and updated as needed: No   Readmission within the last 30 days: no previous admission in last 30 days      Reason for Consult: discharge planning  Advance Care Planning:            Communication Assessment  Patient's communication style: spoken language (English or Bilingual)    Hearing Difficulty or Deaf: no   Wear Glasses or Blind: no    Cognitive  Cognitive/Neuro/Behavioral: .WDL except  Level of Consciousness: confused, lethargic  Arousal Level: opens eyes spontaneously  Orientation: disoriented to, time, situation  Mood/Behavior: withdrawn  Best Language: 1 - Mild to moderate  Speech: whispers    Living Environment:   People in home: grandchild(geetha)  Raad  Current living Arrangements:        Able to return to prior arrangements: no       Family/Social Support:  Care provided by: self, child(geetha)  Provides care for: no one  Marital Status:              Description of Support System:           Current Resources:   Patient receiving home care services: No     Community Resources: None  Equipment currently used at home: grab bar, tub/shower  Supplies currently used at home:      Employment/Financial:  Employment Status:          Financial Concerns:             Lifestyle & Psychosocial Needs:  Social Determinants of Health     Tobacco Use: Medium Risk     Smoking Tobacco Use: Former Smoker     Smokeless Tobacco Use: Never Used   Alcohol Use: Not on file   Financial Resource Strain: Not on file   Food Insecurity: Not on file   Transportation Needs: Not on file   Physical Activity: Not on file   Stress: Not on file   Social Connections: Not on file   Intimate Partner Violence: Not on file   Depression: At risk     PHQ-2 Score: 5   Housing Stability: Not on file       Functional Status:  Prior to admission patient  needed assistance:     Mental Health Status:          Chemical Dependency Status:                Values/Beliefs:  Spiritual, Cultural Beliefs, Religion Practices, Values that affect care:                 Additional Information:  CM consult for discharge planning.  Physician requests LTACH referral.     Spoke briefly to patient.  She was able to state at baseline she lives with her grandson.  She was unable to provide any other information.  Spoke to her daughter Alissa.  She stated it is best to reach her around 2 pm due to her work schedule.  She was surprised her mother was alert and able to answer some questions.  Explained LTACH and the recommendation for this facility at discharge.  She was in agreement for referral to [facility].    Patient has BCBS Medicare Advantage.  [facility] (Case # [id number]).  Clinical information provided by phone.  They will fax their determination sometime today.    Nicolle Talbot RN, BSN, PHN  Inpatient Care Coordination  [facility]  Phone: [phone]

## 2022-08-26 NOTE — PROGRESS NOTES
CM Note:    Voice message left for daughter Alissa to return call to discuss discharge planning.    Addendum @ 1454:  Spoke briefly with patient.  She stated she live with her Grandson and he could be called if unable to contact Alissa.    Did receive a call back from Alissa.  Reviewed LTACH recommendation and she is in agreement.  Explained we will need to get insurance authorization.  She stated due to her work hours, best to call her around 1400.    Nicolle Talbot RN, BSN, PHN  Inpatient Care Coordination  [facility]  Phone: [phone]

## 2022-08-26 NOTE — PLAN OF CARE
August 26, 2022  Shift:0700-1930  Julisa Chaney  77 year old  YOB: 1945    Reason for admission:Cerebral abscess [G06.0]    Cognition/Mentation:A&Ox 2, disoriented to time and situation  Neuros/CMS:SHREYAS, pt would not follow direction  VS:VSS on RA  Cardiac:WNL  GI:rectal tube  :incontinent, external cath  Pulmonary:WNL  Pain:Head pain, tylenol given  Drains/Lines:PICC, G-tube  Skin:scattered bruising   Activity:bedrest, turn and reposition every 2 hours  Diet:NPO, tube feed at goal 50 ml/hr with 200 ml every 4 hours  Discharge:TCU placement pending    Shift summary:Pt would not follow direction or with assessment or with oral cares. Phos 2.4, replaced and recheck in AM.

## 2022-08-27 LAB
GLUCOSE BLDC GLUCOMTR-MCNC: 102 MG/DL (ref 70–99)
GLUCOSE BLDC GLUCOMTR-MCNC: 108 MG/DL (ref 70–99)
GLUCOSE BLDC GLUCOMTR-MCNC: 108 MG/DL (ref 70–99)
GLUCOSE BLDC GLUCOMTR-MCNC: 109 MG/DL (ref 70–99)
GLUCOSE BLDC GLUCOMTR-MCNC: 155 MG/DL (ref 70–99)
GLUCOSE BLDC GLUCOMTR-MCNC: 99 MG/DL (ref 70–99)
PHOSPHATE SERPL-MCNC: 2.6 MG/DL (ref 2.5–4.5)
POTASSIUM BLD-SCNC: 3.8 MMOL/L (ref 3.4–5.3)
VANCOMYCIN SERPL-MCNC: 20.8 MG/L

## 2022-08-27 PROCEDURE — 84132 ASSAY OF SERUM POTASSIUM: CPT | Performed by: INTERNAL MEDICINE

## 2022-08-27 PROCEDURE — 250N000013 HC RX MED GY IP 250 OP 250 PS 637: Performed by: HOSPITALIST

## 2022-08-27 PROCEDURE — 80202 ASSAY OF VANCOMYCIN: CPT | Performed by: INTERNAL MEDICINE

## 2022-08-27 PROCEDURE — 258N000003 HC RX IP 258 OP 636: Performed by: INTERNAL MEDICINE

## 2022-08-27 PROCEDURE — 84100 ASSAY OF PHOSPHORUS: CPT | Performed by: INTERNAL MEDICINE

## 2022-08-27 PROCEDURE — 250N000013 HC RX MED GY IP 250 OP 250 PS 637: Performed by: PHYSICIAN ASSISTANT

## 2022-08-27 PROCEDURE — 99232 SBSQ HOSP IP/OBS MODERATE 35: CPT | Performed by: HOSPITALIST

## 2022-08-27 PROCEDURE — 999N000190 HC STATISTIC VAT ROUNDS

## 2022-08-27 PROCEDURE — 250N000011 HC RX IP 250 OP 636: Performed by: INTERNAL MEDICINE

## 2022-08-27 PROCEDURE — G0463 HOSPITAL OUTPT CLINIC VISIT: HCPCS | Performed by: PHYSICIAN ASSISTANT

## 2022-08-27 PROCEDURE — 250N000011 HC RX IP 250 OP 636: Performed by: HOSPITALIST

## 2022-08-27 PROCEDURE — 120N000001 HC R&B MED SURG/OB

## 2022-08-27 PROCEDURE — 250N000009 HC RX 250

## 2022-08-27 PROCEDURE — 250N000013 HC RX MED GY IP 250 OP 250 PS 637: Performed by: INTERNAL MEDICINE

## 2022-08-27 PROCEDURE — 258N000003 HC RX IP 258 OP 636: Performed by: HOSPITALIST

## 2022-08-27 RX ORDER — ACETAMINOPHEN 325 MG/1
650 TABLET ORAL 3 TIMES DAILY
Status: COMPLETED | OUTPATIENT
Start: 2022-08-27 | End: 2022-08-30

## 2022-08-27 RX ORDER — WATER 10 ML/10ML
INJECTION INTRAMUSCULAR; INTRAVENOUS; SUBCUTANEOUS
Status: COMPLETED
Start: 2022-08-27 | End: 2022-08-27

## 2022-08-27 RX ADMIN — WATER 4.4 ML: 1 INJECTION INTRAMUSCULAR; INTRAVENOUS; SUBCUTANEOUS at 18:02

## 2022-08-27 RX ADMIN — VANCOMYCIN HYDROCHLORIDE 125 MG: KIT at 18:02

## 2022-08-27 RX ADMIN — Medication 3 MG: at 20:22

## 2022-08-27 RX ADMIN — ACETAMINOPHEN 650 MG: 325 TABLET ORAL at 15:53

## 2022-08-27 RX ADMIN — ACETAMINOPHEN 650 MG: 325 TABLET ORAL at 21:50

## 2022-08-27 RX ADMIN — ACETAMINOPHEN 650 MG: 325 SUSPENSION ORAL at 00:58

## 2022-08-27 RX ADMIN — ALTEPLASE 2 MG: 2.2 INJECTION, POWDER, LYOPHILIZED, FOR SOLUTION INTRAVENOUS at 17:49

## 2022-08-27 RX ADMIN — LEVETIRACETAM 1000 MG: 100 SOLUTION ORAL at 10:18

## 2022-08-27 RX ADMIN — VANCOMYCIN HYDROCHLORIDE 125 MG: KIT at 13:53

## 2022-08-27 RX ADMIN — LEVETIRACETAM 1000 MG: 100 SOLUTION ORAL at 20:21

## 2022-08-27 RX ADMIN — MEROPENEM 2 G: 1 INJECTION, POWDER, FOR SOLUTION INTRAVENOUS at 05:11

## 2022-08-27 RX ADMIN — MICONAZOLE NITRATE: 20 CREAM TOPICAL at 10:18

## 2022-08-27 RX ADMIN — MEROPENEM 2 G: 1 INJECTION, POWDER, FOR SOLUTION INTRAVENOUS at 21:21

## 2022-08-27 RX ADMIN — MICONAZOLE NITRATE: 20 CREAM TOPICAL at 20:25

## 2022-08-27 RX ADMIN — Medication 1 PACKET: at 18:02

## 2022-08-27 RX ADMIN — ALTEPLASE 2 MG: 2.2 INJECTION, POWDER, LYOPHILIZED, FOR SOLUTION INTRAVENOUS at 17:53

## 2022-08-27 RX ADMIN — Medication 1 PACKET: at 10:20

## 2022-08-27 RX ADMIN — ALTEPLASE 2 MG: 2.2 INJECTION, POWDER, LYOPHILIZED, FOR SOLUTION INTRAVENOUS at 14:39

## 2022-08-27 RX ADMIN — INSULIN ASPART 1 UNITS: 100 INJECTION, SOLUTION INTRAVENOUS; SUBCUTANEOUS at 12:48

## 2022-08-27 RX ADMIN — Medication 15 ML: at 10:19

## 2022-08-27 RX ADMIN — VANCOMYCIN HYDROCHLORIDE 125 MG: KIT at 10:20

## 2022-08-27 RX ADMIN — Medication 1 PACKET: at 13:53

## 2022-08-27 RX ADMIN — Medication 40 MG: at 10:20

## 2022-08-27 RX ADMIN — VANCOMYCIN HYDROCHLORIDE 125 MG: KIT at 21:50

## 2022-08-27 RX ADMIN — VANCOMYCIN HYDROCHLORIDE 1500 MG: 10 INJECTION, POWDER, LYOPHILIZED, FOR SOLUTION INTRAVENOUS at 13:12

## 2022-08-27 RX ADMIN — ALTEPLASE 2 MG: 2.2 INJECTION, POWDER, LYOPHILIZED, FOR SOLUTION INTRAVENOUS at 14:40

## 2022-08-27 RX ADMIN — MEROPENEM 2 G: 1 INJECTION, POWDER, FOR SOLUTION INTRAVENOUS at 13:01

## 2022-08-27 RX ADMIN — OXYCODONE HYDROCHLORIDE 2.5 MG: 5 TABLET ORAL at 15:54

## 2022-08-27 RX ADMIN — TOPIRAMATE 50 MG: 50 TABLET ORAL at 21:50

## 2022-08-27 RX ADMIN — ACETAMINOPHEN 650 MG: 325 SUSPENSION ORAL at 10:34

## 2022-08-27 RX ADMIN — OXCARBAZEPINE 450 MG: 300 SUSPENSION ORAL at 21:50

## 2022-08-27 ASSESSMENT — ACTIVITIES OF DAILY LIVING (ADL)
ADLS_ACUITY_SCORE: 40
ADLS_ACUITY_SCORE: 38
ADLS_ACUITY_SCORE: 40
ADLS_ACUITY_SCORE: 38
ADLS_ACUITY_SCORE: 40

## 2022-08-27 NOTE — PLAN OF CARE
Pt here with cerebral abscess. Disoriented to time, place, and situation. Neuros intact ex generalized weakness. Inconsistently follows commands. VSS on RA. Tele NSR. NPO; tube feed at 50 ml/hr with 200 ml flushes Q4hr. Takes pills crushed in feeding tube. Up with lift. Incontinent of bowel and bladder. 200 ml stool output overnight. Marked line where recorded output as of 6:30 am. Complaints of HA. Given Tylenol for relief. Pt scoring green on the Aggression Stop Light Tool. Was rather pleasant overnight. Had trouble sleeping second half of night but rested peacefully in bed. Bandar bath completed but refused oral and eye cares. Discharge to pending placement to TCU.

## 2022-08-27 NOTE — PROVIDER NOTIFICATION
"Paged on call MD Dr. Neville, \"Pt stated \"I want to die\" &  told the NA she wanted to kill herself. She's having pain & told me she is having a hard time coping. Doesn't answer my question if she is suicidal. Please advise\"  "

## 2022-08-27 NOTE — PROVIDER NOTIFICATION
"Paged Dr. Mcneill, \"Neurosurgery recs are in. Updated head CT is what they are recommending. \"    ADDENDUM: Dr. Mcneill ordered Head CT.     Repaged Dr. Jarrell, \". Also gave cathflow, I get blood return now but still sluggish. Do you want to do another dose?\"  "

## 2022-08-27 NOTE — PHARMACY-VANCOMYCIN DOSING SERVICE
Pharmacy Vancomycin Note  Date of Service 2022  Patient's  1945   77 year old, female    Indication: Meningitis  Day of Therapy: 38  Current vancomycin regimen:  1750 mg IV q24h  Current vancomycin monitoring method: Trough (Method 2 = manual dose calculation)  Current vancomycin therapeutic monitoring goal: 15-20 mg/L        Current estimated CrCl = Estimated Creatinine Clearance: 89.8 mL/min (A) (based on SCr of 0.4 mg/dL (L)).    Creatinine for last 3 days  2022:  5:57 AM Creatinine 0.40 mg/dL    Recent Vancomycin Levels (past 3 days)  2022: 11:43 AM Vancomycin 20.8 mg/L    Vancomycin IV Administrations (past 72 hours)                   vancomycin 1750 mg in 0.9% NaCl 500 ml intermittent infusion 1,750 mg (mg) 1,750 mg Given 22 1332     1,750 mg Given 22 1303     1,750 mg Given 22 1312                Nephrotoxins and other renal medications (From now, onward)    Start     Dose/Rate Route Frequency Ordered Stop    22 1250  vancomycin 1500 mg in 0.9% NaCl 250 ml intermittent infusion 1,500 mg         1,500 mg  over 90 Minutes Intravenous EVERY 24 HOURS 22 1246      08/15/22 0000  vancomycin        Note to Pharmacy: Check twice weekly creatinine and vancomycin levels. Dosing per Pharm D based on AUC       08/15/22 1505 22 2359    22 0900  vancomycin (FIRVANQ) oral solution 125 mg         125 mg Oral or Feeding Tube 4 TIMES DAILY 22 0819               Contrast Orders - past 72 hours (72h ago, onward)    None          Interpretation of levels and current regimen:  Vancomycin level is reflective of supratherapeutic level    Has serum creatinine changed greater than 50% in last 72 hours: No    Urine output:  unable to determine    Renal Function: Stable      Plan:  1. Decrease Dose to 1500 mg every 24 hours   2. Vancomycin monitoring method: Trough (Method 2 = manual dose calculation)  3. Vancomycin therapeutic monitoring goal: 15-20  mg/L  4. Pharmacy will check vancomycin levels as appropriate in 1-3 Days.  5. Serum creatinine levels will be ordered daily for the first week of therapy and at least twice weekly for subsequent weeks.    Azra Durant RPH

## 2022-08-27 NOTE — PROVIDER NOTIFICATION
"Paged Osito \"  : [facility] neurosurgery was made aware of HA, he said he would review the chart earlier, no orders or notes. She is still rating her pain severe.\"     Addendum: Oxycodone ordered x1  "

## 2022-08-27 NOTE — PROGRESS NOTES
Cross Cover Note    Called requesting something for sleep. Would like to trial melatonin first then can give small dose of Seroquel if no improvement. Defer to rounding Hospitalist long term management for difficulty sleeping/falling asleep.    Yessica Moon PA-C

## 2022-08-27 NOTE — PROGRESS NOTES
"[facility]  Hospitalist Progress Note        Brian Mcneill MD   08/27/2022        Interval History:        - patient much more alert, awake and conversant  - reports mild persistent headaches, states \"it has been present since I came in\"; will schedule Tylenol 650 mg TID; defer to NSG for further workup although patient declines any imaging   - started on melatonin prn for sleep         Assessment and Plan:        Julisa Chaney is a 77-year-old woman with PMH of trigeminal neuralgia who was recently admitted to [facility] on 7/19 for severe sepsis (had leukocytosis, lactic acidosis and elevated procalcitonin) due to COVID-19 infection and suspected bacterial pneumonia. She was treated with 5 days of remdesivir and did not require any steroids , also treated with IV vancomycin and IV meropenem.     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  She was switched to IV vancomycin, cefepime and metronidazole and transferred to [facility] for neurosurgery assessment. MRI brain w/wo contrast 7/30 showed ventriculitis with probable abscess. Neurosurgery/neurology/infectious disease/oncology consulted. Patient taken to the OR on 7/31 for left frontal ventriculostomy.   Since then has remained on broad spectrum antibiotics.  In addition she has developed C. Difficile colitis and is on treatment.  She has continued to have a significant encephalopathy.     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  - flow cytometry was done which did not show any evidence of malignancy.  - s/p Left " "frontal ventriculostomy on 7/31/2022, EVD removed 8/8; CSF cultures have remained negative; CSF counts decreasing 469---233 ---56 (last CSF from 8/18)  - last head CT from 8/19 with stable presumed vasogenic edema in the white matter of the anterior inferior left frontal lobe related to ventriculitis of the left lateral ventricle; stable ventriculomegaly of the left lateral ventricle  - neurosurgery signed off 8/24  - ID following  - remains afebrile; wbc 21--->leukocytosis resolved ; was initially on vancomycin, cefepime, and metronidazole-- then Meropenem and Vancomycin; ID following, plan for 6 weeks treatment until 9/8/22    - 8/27 reports mild persistent headaches, states \"it has been present since I came in\"; will schedule Tylenol 650 mg TID; defer to NS for further workup although patient declines any imaging      Acute infectious encephalopathy due to above--improving:  - mentation much improved since admission ; fluctuating at times but now more conversant   - Continue to treat underlying issues as noted above.    - Maintain normal sleep/wake cycle as able.  - 8/23 discontinued prn benadryl, narcotics; minimize narcotic; if needed for headache, will plan to order as one time dose rather than prn available     C. difficile colitis:  Patient was noted to have copious diarrhea on 8/1 and was noted positive for C. Difficile.  -Continue oral vancomycin.  Will need a prolonged treatment given other abx use.  - continue Rectal tube   -ID following as noted above.     Acute on chronic anemia:  - Prior labs show baseline hemoglobin of about 9-10.   - Hb on admission was 11.7, slowly trended down, got 1 unit PRBC on 8/8/22 for Hb 6.9  - No obvious ongoing bleeding noted; Hb hs now remained stable around 9 to 10  - iron panel on 7/30/22 showed that her total iron was low at 14, binding capacity was low at 28, iron saturation was 6 and B12 was high and folate was normal  - received IV iron infusion x3 with first on " 8/11/22.  -Continue to monitor hgb      History of trigeminal neuralgia  - Continue PTA Trileptal , Keppra and Topamax.     History of chronic pyloric ulcer  She had EGD done in 2020 which showed gastroduodenitis with a duodenal stricture.  - Continue PPI.     Hyperglycemia  Recent HbA1c was 5.6 on 7/19/22. Was not on any medication prior to admission.  - Continue medium dose sliding scale insulin every 4 hours while on tube feeds  - started on Lantus 10 units bedtime, continue .      Intermittent Hypokalemia, Hypo/hypernatremia, Hypophosphatemia:  - Recheck and replace per protocol.  - Continue to follow trend  - hypernatremia improved; continue free water flushes 200 ml  every 4 hrs     Severe protein calorie malnutrition:  - Nutrition following for tube feeds via G tube (placed 8/16) ; getting free water flushes  - Will need SLP consult when more able to participate     Physical deconditioning:  - will need TCU/ LTC placement; continue therapy as able to participate  - discussed with SW to consider LTACH placement  - SW following for disposition    Diet: NPO for Medical/Clinical Reasons Except for: Other; Specify: NPO For oral intake and gastric feedings for 6 hours post insertion Gastrostomy G/GJ tube. May feed through Jejunal or Distal PORT immediately for GJ tubes ONLY.  Adult Formula Drip Feeding: Continuous Vital 1.5; Gastrostomy; Goal Rate: 50; mL/hr; Medication - Feeding Tube Flush Frequency: At least 15-30 mL water before and after medication administration and with tube clogging; Amount to Send (Nutrition us...      DVT Prophylaxis: Pneumatic Compression Devices    Central Lines: PRESENT  PICC Triple Lumen 07/22/22 Right Basilic Vascular access-Site Assessment: WDL    Code Status: Full Code          Disposition Plan    Expected Discharge Date: unclear at this time; pending clinical improvement  - planned for TCU; might need LTACH; SW following for disposition    Clinically Significant Risk Factors  Present on Admission                         Page Me (7 am to 6 pm)    Care plan discussed with patient and nursing.              Physical Exam:      Blood pressure 118/60, pulse 85, temperature 96.8  F (36  C), temperature source Axillary, resp. rate 16, weight 48.3 kg (106 lb 7.7 oz), SpO2 98 %, not currently breastfeeding.  Vitals:    08/22/22 0933 08/24/22 0629 08/25/22 0610   Weight: 56 kg (123 lb 7.3 oz) 48.3 kg (106 lb 7.7 oz) 48.3 kg (106 lb 7.7 oz)     Vital Signs with Ranges  Temp:  [96.8  F (36  C)-97.6  F (36.4  C)] 96.8  F (36  C)  Pulse:  [78-86] 85  Resp:  [16-18] 16  BP: (114-123)/(59-62) 118/60  SpO2:  [97 %-100 %] 98 %  I/O's Last 24 hours  I/O last 3 completed shifts:  In: 960 [NG/GT:510]  Out: 1700 [Urine:1300; Stool:400]    Constitutional: much more alert, awake and conversant; oriented to self and place; follows commands; moving all extremities equally; in no apparent distress   HEENT: Pupils equal and reactive to light and accomodation, neck supple    Oral cavity: Dry oral mucosa   Cardiovascular: Normal s1 s2, regular rate and rhythm, no murmur   Lungs: B/l clear to auscultation, no wheezes or crepitations   Abdomen: Soft, nt, nd, no guarding, rigidity or rebound; BS +   LE : No edema   Musculoskeletal: moving all extremities equally   Neuro: No focal neurological deficits noted   Rectal tube in place with brownish liquid stool                Medications:          banatrol plus  1 packet Per Feeding Tube TID w/meals     insulin aspart  1-6 Units Subcutaneous Q4H     insulin glargine  10 Units Subcutaneous At Bedtime     levETIRAcetam  1,000 mg Oral or Feeding Tube Q12H     meropenem  2 g Intravenous Q8H     miconazole with skin protectant   Topical BID     multivitamins w/minerals  15 mL Per Feeding Tube Daily     OXcarbazepine  450 mg Oral or Feeding Tube At Bedtime     pantoprazole  40 mg Oral or Feeding Tube QAM AC     sodium chloride (PF)  10-40 mL Intracatheter Q7 Days     topiramate  50  mg Oral or Feeding Tube At Bedtime     vancomycin  125 mg Oral or Feeding Tube 4x Daily     vancomycin  1,750 mg Intravenous Q24H     PRN Meds: acetaminophen **OR** acetaminophen, artificial tears ophthalmic solution, glucose **OR** dextrose **OR** glucagon, guaiFENesin, ipratropium - albuterol 0.5 mg/2.5 mg/3 mL, - MEDICATION INSTRUCTIONS -, melatonin, metoprolol, miconazole with skin protectant, naloxone **OR** naloxone **OR** naloxone **OR** naloxone, ondansetron **OR** ondansetron, prochlorperazine **OR** prochlorperazine **OR** prochlorperazine, QUEtiapine, sodium chloride (PF), sodium chloride (PF)         Data:      All new lab and imaging data was reviewed.   Recent Labs   Lab Test 08/26/22  0557 08/23/22  1920 08/21/22  0514 07/30/22  0559 07/29/22  1414 07/28/22  0408 07/27/22  2231 07/20/22  0923 07/19/22  2255   WBC 6.4 7.4 6.6   < >  --    < > 6.8   < >  --    HGB 10.5* 10.2* 9.4*   < >  --    < > 9.6*   < >  --    MCV 84 84 86   < >  --    < > 73*   < >  --    * 553* 638*   < >  --    < > 547*   < >  --    INR  --   --   --   --  1.34*  --  1.13  --  1.36*    < > = values in this interval not displayed.      Recent Labs   Lab Test 08/27/22  0619 08/27/22  0349 08/27/22  0045 08/26/22  2041 08/26/22  1237 08/26/22  0557 08/25/22  0829 08/25/22  0546 08/24/22  0809 08/24/22  0555 08/22/22  0809 08/22/22  0603   NA  --   --   --   --   --  138  --   --   --  138  --  137   POTASSIUM 3.8  --   --   --   --  3.8  --  3.9  --  3.8   < > 3.4   CHLORIDE  --   --   --   --   --  107  --   --   --  108  --  106   CO2  --   --   --   --   --  29  --   --   --  26  --  26   BUN  --   --   --   --   --  12  --   --   --  12  --  13   CR  --   --   --   --   --  0.40*  --   --   --  0.33*  --  0.43*   ANIONGAP  --   --   --   --   --  2*  --   --   --  4  --  5   ADY  --   --   --   --   --  8.6  --   --   --  8.7  --  8.5   GLC  --  99 108* 114*   < > 95   < >  --    < > 124*   < > 118*    < > = values in  this interval not displayed.     No lab results found.    Invalid input(s): TROP, TROPONINIES

## 2022-08-27 NOTE — PLAN OF CARE
Goal Outcome Evaluation:    Plan of Care Reviewed With: patient     Overall Patient Progress: no change         Reason for Admission: cerebral abcess    Cognitive/Mentation: A/Ox 2, forgetful, confused  Neuros/CMS: Intact ex generalized weakness RLE weaker--(3/5). Refuses parts of the neuro exam.   VS: stable on RA.   GI: BS active, continues to have loose stools. Rectal tube which needs to be removed 8/296/22 per order.  : Purewick. Incontinent.  Pain: C/o severe HA pain-- declines ice/heat interventions. Scheduled tylenol ordered. Received 1x of oxycodone. CT ordered for head but she is currently refusing to go. Education provided and attempted x2.      Drains/Lines: R PICC--another dose of alteplase given in white & grey lumen  Skin: redness on perianal & perineum  Activity: Assist x 2 with lift.  Diet: NPO, TF running.     Discharge: LTACH vs TCU pending placement    Aggression Stoplight Tool: yellow    End of shift summary: refusing CT at this time.

## 2022-08-27 NOTE — CONSULTS
NEUROSURGERY CONSULT      PLAN:    Ms. Chaney is refusing all imaging studies although we do feel that it would be in her best interest to obtain an updated head CT for rule out any concerning pathology. We will sign off.     It has been a pleasure meeting Julisa Chaney. Thank you for having us be involved in her care.    ______________________________________________________________________    HPI:    Julisa Chaney is a pleasant 77 year old female who is well known to our practice as Dr. Heredia performed a left frontal ventriculostomy 27 days ago and removed the EVD 19 days ago. We were asked to evaluate her again as she has a headache that she rates 10/10.     There are no bowel or bladder changes. No other concerns are voiced.    Past Medical History:   Diagnosis Date     Aspiration pneumonia (H) 02/2022     Depression      High cholesterol      Migraines      Pyloric ulcer, chronic      Sepsis (H) 08/10/2020     Trigeminal neuralgia        Past Surgical History:   Procedure Laterality Date     HYSTERECTOMY       IR GASTROSTOMY TUBE PERCUTANEOUS PLCMNT  8/16/2022     PICC TRIPLE LUMEN PLACEMENT  7/22/2022          MO ESOPHAGOGASTRODUODENOSCOPY TRANSORAL DIAGNOSTIC N/A 8/13/2020    Procedure: ESOPHAGOGASTRODUODENOSCOPY (EGD);  Surgeon: Nathan Smalls MD;  Location: [facility];  Service: Gastroenterology     MO ESOPHAGOGASTRODUODENOSCOPY TRANSORAL DIAGNOSTIC N/A 8/14/2020    Procedure: ESOPHAGOGASTRODUODENOSCOPY (EGD), PYLORIC DILATION;  Surgeon: Nathan Smalls MD;  Location: [facility];  Service: Gastroenterology     VENTRICULOSTOMY Left 7/31/2022    Procedure: LEFT FRONTAL VENTRICULOSTOMY;  Surgeon: Brady Heredia MD;  Location: [facility] COLONOSCOPY W/WO BRUSH/WASH N/A 8/13/2020    Procedure: COLONOSCOPY WITH POLYPECTOMY;  Surgeon: Nathan Smalls MD;  Location: [facility];  Service: Gastroenterology       Allergies   Allergen Reactions     Contrast [Iohexol] Rash      Noticed during 2020 admission. Received IV contrast on      Chocolate Headache     Penicillins Rash       Social History     Tobacco Use     Smoking status: Former Smoker     Packs/day: 1.00     Years: 50.00     Pack years: 50.00     Quit date: 2014     Years since quittin.7     Smokeless tobacco: Never Used   Substance Use Topics     Alcohol use: No       Family History   Problem Relation Age of Onset     Cancer Father      Testicular cancer Grandchild 17.00     Breast Cancer Mother      Breast Cancer Sister      Breast Cancer Maternal Aunt      Breast Cancer Sister        Scheduled Medications      acetaminophen  650 mg Oral TID     banatrol plus  1 packet Per Feeding Tube TID w/meals     insulin aspart  1-6 Units Subcutaneous Q4H     insulin glargine  10 Units Subcutaneous At Bedtime     levETIRAcetam  1,000 mg Oral or Feeding Tube Q12H     meropenem  2 g Intravenous Q8H     miconazole with skin protectant   Topical BID     multivitamins w/minerals  15 mL Per Feeding Tube Daily     OXcarbazepine  450 mg Oral or Feeding Tube At Bedtime     oxyCODONE  2.5 mg Oral Once     pantoprazole  40 mg Oral or Feeding Tube QAM AC     sodium chloride (PF)  10-40 mL Intracatheter Q7 Days     topiramate  50 mg Oral or Feeding Tube At Bedtime     vancomycin  125 mg Oral or Feeding Tube 4x Daily     vancomycin  1,500 mg Intravenous Q24H       Home Medications      PRN Medications    acetaminophen **OR** acetaminophen, artificial tears ophthalmic solution, glucose **OR** dextrose **OR** glucagon, guaiFENesin, ipratropium - albuterol 0.5 mg/2.5 mg/3 mL, - MEDICATION INSTRUCTIONS -, melatonin, metoprolol, miconazole with skin protectant, naloxone **OR** naloxone **OR** naloxone **OR** naloxone, ondansetron **OR** ondansetron, prochlorperazine **OR** prochlorperazine **OR** prochlorperazine, QUEtiapine, sodium chloride (PF), sodium chloride (PF)    ROS: 10 point ROS neg other than the symptoms noted above in the  HPI.    Vitals:    /66 (BP Location: Left arm)   Pulse 92   Temp 98.1  F (36.7  C) (Axillary)   Resp 16   Wt 106 lb 7.7 oz (48.3 kg)   SpO2 95%   BMI 17.72 kg/m    Body mass index is 17.72 kg/m .  I/O last 3 completed shifts:  In: 960 [NG/GT:510]  Out: 1700 [Urine:1300; Stool:400]    Exam:    Patient appears uncomfortable, conversational, but in no apparent distress.   Head: Normocephalic, without obvious abnormality, atraumatic, no facial asymmetry.   Eyes: conjunctivae/corneas clear. PERRL, EOM's intact.   Throat: lips, mucosa, and tongue normal; teeth and gums normal.   Neck: supple, symmetrical, trachea midline, no adenopathy and thyroid: not enlarged, symmetric, no tenderness/mass/nodules.   Lungs: clear to auscultation bilaterally.   Heart: regular rate and rhythm.   Abdomen: soft, non-tender; bowel sounds normal; no masses, no organomegaly.   Pulses: 2+ and symmetric.   Skin: Skin color, texture, turgor normal. No rashes or lesions.     CN II-XII grossly intact, alert and appropriate with conversation and following commands.   Cervical spine is non tender to palpation. Appropriate range of motion of neck, not concerning for lhermitte's phenomenon.   Bilateral bicep 2/4 and tricep reflexes 1/4. Sensation intact throughout upper extremities.     UE muscle strength  Right  Left    Deltoid  5/5  5/5    Biceps  5/5  5/5    Triceps  5/5  5/5    Hand intrinsics  5/5  5/5    Hand grasp  5/5  5/5    Pavon signs  neg  neg      Lumbar spine is non tender to palpation   Intact sensation throughout lower extremities.   Bilateral patellar 2/4 and achilles reflex 1/4.    LE muscle strength  Right  Left    Iliopsoas (hip flexion)  5/5  5/5    Quad (knee extension)  5/5  5/5    Hamstring (knee flexion)  5/5  5/5    Gastrocnemius (PF)  5/5  5/5    Tibialis Ant. (DF)  5/5  5/5    EHL  5/5  5/5      Negative Babinski bilaterally. Negative for clonus.   Calves are soft and non-tender bilaterally.     Available labs  at time of consult:       Recent Labs   Lab 08/26/22  0557 08/23/22  1920 08/21/22  0514   WBC 6.4 7.4 6.6   HGB 10.5* 10.2* 9.4*   HCT 36.1 35.5 33.6*   MCV 84 84 86   * 553* 638*     Recent Labs   Lab 08/26/22  0557 08/23/22  1920 08/21/22  0514   WBC 6.4 7.4 6.6   HGB 10.5* 10.2* 9.4*   HCT 36.1 35.5 33.6*   MCV 84 84 86   * 553* 638*     No results for input(s): SED, CRP in the last 168 hours.  Recent Labs   Lab 08/26/22  0557 08/23/22 1920 08/21/22  0514   HGB 10.5* 10.2* 9.4*     No results for input(s): INR in the last 168 hours.  Recent Labs   Lab 08/26/22  0557 08/23/22  1920 08/21/22  0514   * 553* 638*     Recent Labs   Lab 08/26/22  0557 08/23/22  1920 08/21/22  0514   WBC 6.4 7.4 6.6         Respectfully,    ANNIE Merino, PAHEVER  [facility]     Tel: [phone]      All imaging, physical findings, and the above plan have been reviewed with Dr. Marks.

## 2022-08-27 NOTE — PLAN OF CARE
Goal Outcome Evaluation:  Reason for Admission: Cerebral abscess    Cognitive/Mentation: A/Ox 3, disoriented to time  Neuros/CMS: Intact ex general weakness, RLE weakness 2/5, and confusion  VS: stable.   Tele: NSR.  GI: BS active, + flatus, rectal tube in place due to be removed on the 8/29/22  : voiding. Incontinent; purewick in place  Pulmonary: LS clear.  Pain: 10/10 headache, Tylenol and ice given, MD aware.     Drains/Lines: PICC  Skin: redness in khushbu/anal area  Activity: Assist x 2 with lift.  Diet: tube feedings    Discharge: LTACH vs TCU pending placement    Aggression Stoplight Tool: green    End of shift summary: Plan to give cathflow in white and grey lumen for PICC

## 2022-08-27 NOTE — PROVIDER NOTIFICATION
"Paged Dony CORRIGAN, \"Pt c/o HA 10/10. Hospitalist wanted us to reach out if additional imaging is needed. Has been refusing MRI\"  Dony called back and he will review chart.  "

## 2022-08-27 NOTE — PROVIDER NOTIFICATION
"Paged Doctor Osito, \"On the grey and white lines of the picc it is sluggish with no blood return. Do you want to order alteplase?\"   "

## 2022-08-28 LAB
CREAT SERPL-MCNC: 0.39 MG/DL (ref 0.52–1.04)
GFR SERPL CREATININE-BSD FRML MDRD: >90 ML/MIN/1.73M2
GLUCOSE BLDC GLUCOMTR-MCNC: 104 MG/DL (ref 70–99)
GLUCOSE BLDC GLUCOMTR-MCNC: 109 MG/DL (ref 70–99)
GLUCOSE BLDC GLUCOMTR-MCNC: 112 MG/DL (ref 70–99)
GLUCOSE BLDC GLUCOMTR-MCNC: 112 MG/DL (ref 70–99)
GLUCOSE BLDC GLUCOMTR-MCNC: 143 MG/DL (ref 70–99)
GLUCOSE BLDC GLUCOMTR-MCNC: 94 MG/DL (ref 70–99)
PHOSPHATE SERPL-MCNC: 2.5 MG/DL (ref 2.5–4.5)
POTASSIUM BLD-SCNC: 3.9 MMOL/L (ref 3.4–5.3)

## 2022-08-28 PROCEDURE — 250N000013 HC RX MED GY IP 250 OP 250 PS 637: Performed by: PHYSICIAN ASSISTANT

## 2022-08-28 PROCEDURE — 84132 ASSAY OF SERUM POTASSIUM: CPT | Performed by: HOSPITALIST

## 2022-08-28 PROCEDURE — 250N000013 HC RX MED GY IP 250 OP 250 PS 637: Performed by: INTERNAL MEDICINE

## 2022-08-28 PROCEDURE — 258N000003 HC RX IP 258 OP 636: Performed by: INTERNAL MEDICINE

## 2022-08-28 PROCEDURE — 250N000011 HC RX IP 250 OP 636: Performed by: INTERNAL MEDICINE

## 2022-08-28 PROCEDURE — 250N000011 HC RX IP 250 OP 636: Performed by: HOSPITALIST

## 2022-08-28 PROCEDURE — 250N000013 HC RX MED GY IP 250 OP 250 PS 637: Performed by: HOSPITALIST

## 2022-08-28 PROCEDURE — 82565 ASSAY OF CREATININE: CPT | Performed by: INTERNAL MEDICINE

## 2022-08-28 PROCEDURE — 999N000190 HC STATISTIC VAT ROUNDS

## 2022-08-28 PROCEDURE — 99233 SBSQ HOSP IP/OBS HIGH 50: CPT | Performed by: HOSPITALIST

## 2022-08-28 PROCEDURE — 84100 ASSAY OF PHOSPHORUS: CPT | Performed by: HOSPITALIST

## 2022-08-28 PROCEDURE — 258N000003 HC RX IP 258 OP 636: Performed by: HOSPITALIST

## 2022-08-28 PROCEDURE — 120N000001 HC R&B MED SURG/OB

## 2022-08-28 RX ADMIN — ACETAMINOPHEN 650 MG: 325 TABLET ORAL at 10:47

## 2022-08-28 RX ADMIN — ACETAMINOPHEN 650 MG: 325 TABLET ORAL at 17:29

## 2022-08-28 RX ADMIN — Medication 3 MG: at 22:16

## 2022-08-28 RX ADMIN — LEVETIRACETAM 1000 MG: 100 SOLUTION ORAL at 08:46

## 2022-08-28 RX ADMIN — INSULIN ASPART 1 UNITS: 100 INJECTION, SOLUTION INTRAVENOUS; SUBCUTANEOUS at 00:59

## 2022-08-28 RX ADMIN — Medication 1 PACKET: at 17:29

## 2022-08-28 RX ADMIN — Medication 1 PACKET: at 12:46

## 2022-08-28 RX ADMIN — VANCOMYCIN HYDROCHLORIDE 125 MG: KIT at 12:46

## 2022-08-28 RX ADMIN — QUETIAPINE FUMARATE 25 MG: 25 TABLET ORAL at 00:42

## 2022-08-28 RX ADMIN — VANCOMYCIN HYDROCHLORIDE 125 MG: KIT at 08:45

## 2022-08-28 RX ADMIN — ACETAMINOPHEN 650 MG: 325 SUSPENSION ORAL at 06:06

## 2022-08-28 RX ADMIN — VANCOMYCIN HYDROCHLORIDE 1500 MG: 10 INJECTION, POWDER, LYOPHILIZED, FOR SOLUTION INTRAVENOUS at 12:46

## 2022-08-28 RX ADMIN — OXCARBAZEPINE 450 MG: 300 SUSPENSION ORAL at 22:17

## 2022-08-28 RX ADMIN — TOPIRAMATE 50 MG: 50 TABLET ORAL at 22:17

## 2022-08-28 RX ADMIN — MICONAZOLE NITRATE: 20 CREAM TOPICAL at 19:52

## 2022-08-28 RX ADMIN — MICONAZOLE NITRATE: 20 CREAM TOPICAL at 09:07

## 2022-08-28 RX ADMIN — Medication 40 MG: at 08:46

## 2022-08-28 RX ADMIN — VANCOMYCIN HYDROCHLORIDE 125 MG: KIT at 17:29

## 2022-08-28 RX ADMIN — Medication 15 ML: at 08:46

## 2022-08-28 RX ADMIN — Medication 1 PACKET: at 08:45

## 2022-08-28 RX ADMIN — LEVETIRACETAM 1000 MG: 100 SOLUTION ORAL at 19:53

## 2022-08-28 RX ADMIN — ACETAMINOPHEN 650 MG: 325 TABLET ORAL at 22:16

## 2022-08-28 RX ADMIN — VANCOMYCIN HYDROCHLORIDE 125 MG: KIT at 22:17

## 2022-08-28 RX ADMIN — MEROPENEM 2 G: 1 INJECTION, POWDER, FOR SOLUTION INTRAVENOUS at 04:29

## 2022-08-28 RX ADMIN — MEROPENEM 2 G: 1 INJECTION, POWDER, FOR SOLUTION INTRAVENOUS at 19:52

## 2022-08-28 RX ADMIN — MEROPENEM 2 G: 1 INJECTION, POWDER, FOR SOLUTION INTRAVENOUS at 12:46

## 2022-08-28 RX ADMIN — MICONAZOLE NITRATE: 20 CREAM TOPICAL at 13:21

## 2022-08-28 ASSESSMENT — ACTIVITIES OF DAILY LIVING (ADL)
ADLS_ACUITY_SCORE: 40
ADLS_ACUITY_SCORE: 33
ADLS_ACUITY_SCORE: 40
ADLS_ACUITY_SCORE: 40

## 2022-08-28 NOTE — PROGRESS NOTES
"[facility]  Hospitalist Progress Note        Brian Mcneill MD   08/28/2022        Interval History:        - Patient very angry, agitated at times, reports being suicidal, when asked about plan, she states \"I am not going to tell you\"  - continues to refuse any head imagings  - d/w her daughter Alissa; discussed patient's decision of not having the scan; at this point not sure if patient is decisional; psych to eval; if no significant improvement in mood/agitation with psych eval, then will need to discuss with neurosurgery about head imagings and if strongly recommended, will probably need CT/MRI under sedation/anesthesia with family's consent         Assessment and Plan:        Julisa Chaney is a 77-year-old woman with PMH of trigeminal neuralgia who was recently admitted to [facility] on 7/19 for severe sepsis (had leukocytosis, lactic acidosis and elevated procalcitonin) due to COVID-19 infection and suspected bacterial pneumonia. She was treated with 5 days of remdesivir and did not require any steroids, also treated with IV vancomycin and IV meropenem.     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  She was switched to IV vancomycin, cefepime and metronidazole and transferred to [facility] for neurosurgery assessment. MRI brain w/wo contrast 7/30 showed ventriculitis with probable abscess. Neurosurgery/neurology/infectious disease/oncology consulted. Patient taken to the OR on 7/31 for left frontal ventriculostomy.   Since then has remained on broad spectrum antibiotics.  In addition she has developed C. Difficile colitis and is on treatment.  She has continued to have a significant encephalopathy.     Severe sepsis " secondary to ventriculitis with left lateral ventricle abscess  S/p left frontal ventriculostomy 7/31/22:  - Appreciate neurosurgery, oncology, infectious disease service assistance.  - flow cytometry was done which did not show any evidence of malignancy.  - s/p Left frontal ventriculostomy on 7/31/2022, EVD removed 8/8; CSF cultures have remained negative; CSF counts decreasing 469---233 ---56 (last CSF from 8/18)  - last head CT from 8/19 with stable presumed vasogenic edema in the white matter of the anterior inferior left frontal lobe related to ventriculitis of the left lateral ventricle; stable ventriculomegaly of the left lateral ventricle  - neurosurgery signed off 8/27  - ID following  - remains afebrile; wbc 21--->leukocytosis resolved ; was initially on vancomycin, cefepime, and metronidazole-- then Meropenem and Vancomycin; ID following, plan for 6 weeks treatment until 9/8/22    - 8/27 reporting mild persistent headaches; reevaluated by neurosurgery and recommended head CT but patient refusing any scans  - continue scheduled Tylenol 650 mg TID  - trying to avoid any narcotics with her confusion/ encephalopathy     - 8/28, d/w her daughter Alissa; discussed patient's decision of not having the scan; at this point not sure if patient is decisional; psych to eval; if no significant improvement in mood/agitation with psych eval, then will need to discuss with neurosurgery about head imagings and if strongly recommended, will probably need CT/MRI under sedation/anesthesia with family's consent     Acute infectious encephalopathy due to above:  - mentation much improved since admission ; fluctuating at times   - Continue to treat underlying issues as noted above.    - Maintain normal sleep/wake cycle as able.  - 8/23 discontinued prn benadryl, narcotics; minimize narcotic; if needed for headache, will plan to order as one time dose rather than prn available    Suicidal ideations/ agitation  -with improvement  "in her mentation, she has started becoming at times restless and agitated  - reports being suicidal, when asked about plan, she states \"I am not going to tell you\"  - will get Psychiatry eval; suicide precautions; sitter in room     C. difficile colitis:  Patient was noted to have copious diarrhea on 8/1 and was noted positive for C. Difficile.  -Continue oral vancomycin.  Will need a prolonged treatment given other abx use.  - continue Rectal tube   -ID following as noted above.     Acute on chronic anemia:  - Prior labs show baseline hemoglobin of about 9-10.   - Hb on admission was 11.7, slowly trended down, got 1 unit PRBC on 8/8/22 for Hb 6.9  - No obvious ongoing bleeding noted; Hb has now remained stable around 9 to 10  - iron panel on 7/30/22 showed that her total iron was low at 14, binding capacity was low at 28, iron saturation was 6 and B12 was high and folate was normal  - received IV iron infusion x3 with first on 8/11/22.  -Continue to monitor hgb      History of trigeminal neuralgia  - Continue PTA Trileptal , Keppra and Topamax.     History of chronic pyloric ulcer  She had EGD done in 2020 which showed gastroduodenitis with a duodenal stricture.  - Continue PPI.     Hyperglycemia  Recent HbA1c was 5.6 on 7/19/22. Was not on any medication prior to admission.  - Continue medium dose sliding scale insulin every 4 hours while on tube feeds  - started on Lantus 10 units bedtime, continue .      Intermittent Hypokalemia, Hypo/hypernatremia, Hypophosphatemia:  - Recheck and replace per protocol.  - Continue to follow trend  - hypernatremia improved; continue free water flushes 200 ml  every 4 hrs     Severe protein calorie malnutrition:  - Nutrition following for tube feeds via G tube (placed 8/16) ; getting free water flushes  - Will need SLP consult when more able to participate     Physical deconditioning:  - will need TCU placement; declined by LTACH; continue therapy as able to participate  - SW " following for disposition    Diet: NPO for Medical/Clinical Reasons Except for: Other; Specify: NPO For oral intake and gastric feedings for 6 hours post insertion Gastrostomy G/GJ tube. May feed through Jejunal or Distal PORT immediately for GJ tubes ONLY.  Adult Formula Drip Feeding: Continuous Vital 1.5; Gastrostomy; Goal Rate: 50; mL/hr; Medication - Feeding Tube Flush Frequency: At least 15-30 mL water before and after medication administration and with tube clogging; Amount to Send (Nutrition us...      DVT Prophylaxis: Pneumatic Compression Devices    Central Lines: PRESENT  PICC Triple Lumen 07/22/22 Right Basilic Vascular access-Site Assessment: WDL    Code Status: Full Code          Disposition Plan    Expected Discharge Date: unclear at this time; pending clinical improvement  - planned for TCU; declined by LTACH; [facility] following for disposition    Clinically Significant Risk Factors Present on Admission                         Page Me (7 am to 6 pm)    Care plan discussed with patient and her daughter Alissa over the phone; also discussed with nursing.              Physical Exam:      Blood pressure 120/54, pulse 84, temperature 97.6  F (36.4  C), temperature source Axillary, resp. rate 16, weight 48.3 kg (106 lb 7.7 oz), SpO2 100 %, not currently breastfeeding.  Vitals:    08/22/22 0933 08/24/22 0629 08/25/22 0610   Weight: 56 kg (123 lb 7.3 oz) 48.3 kg (106 lb 7.7 oz) 48.3 kg (106 lb 7.7 oz)     Vital Signs with Ranges  Temp:  [96.2  F (35.7  C)-98.1  F (36.7  C)] 97.6  F (36.4  C)  Pulse:  [81-92] 84  Resp:  [16] 16  BP: (108-120)/(51-66) 120/54  SpO2:  [95 %-100 %] 100 %  I/O's Last 24 hours  I/O last 3 completed shifts:  In: 980 [NG/GT:980]  Out: 2000 [Urine:1800; Stool:200]    Constitutional: alert, awake and conversant; oriented to self and place; follows commands; moving all extremities equally; in no apparent distress but angry   HEENT: Pupils equal and reactive to light and accomodation, neck  supple    Oral cavity: Dry oral mucosa   Cardiovascular: Normal s1 s2, regular rate and rhythm, no murmur   Lungs: B/l clear to auscultation, no wheezes or crepitations   Abdomen: Soft, nt, nd, no guarding, rigidity or rebound; BS +   LE : No edema   Musculoskeletal: moving all extremities equally   Neuro: No focal neurological deficits noted   Rectal tube in place with brownish liquid stool  Psych: angry mood, agitated at times                Medications:          acetaminophen  650 mg Oral TID     banatrol plus  1 packet Per Feeding Tube TID w/meals     insulin aspart  1-6 Units Subcutaneous Q4H     insulin glargine  10 Units Subcutaneous At Bedtime     levETIRAcetam  1,000 mg Oral or Feeding Tube Q12H     meropenem  2 g Intravenous Q8H     miconazole with skin protectant   Topical BID     multivitamins w/minerals  15 mL Per Feeding Tube Daily     OXcarbazepine  450 mg Oral or Feeding Tube At Bedtime     pantoprazole  40 mg Oral or Feeding Tube QAM AC     sodium chloride (PF)  10-40 mL Intracatheter Q7 Days     topiramate  50 mg Oral or Feeding Tube At Bedtime     vancomycin  125 mg Oral or Feeding Tube 4x Daily     vancomycin  1,500 mg Intravenous Q24H     PRN Meds: acetaminophen **OR** acetaminophen, artificial tears ophthalmic solution, glucose **OR** dextrose **OR** glucagon, guaiFENesin, ipratropium - albuterol 0.5 mg/2.5 mg/3 mL, - MEDICATION INSTRUCTIONS -, melatonin, metoprolol, miconazole with skin protectant, naloxone **OR** naloxone **OR** naloxone **OR** naloxone, ondansetron **OR** ondansetron, prochlorperazine **OR** prochlorperazine **OR** prochlorperazine, sodium chloride (PF), sodium chloride (PF)         Data:      All new lab and imaging data was reviewed.   Recent Labs   Lab Test 08/26/22  0557 08/23/22  1920 08/21/22  0514 07/30/22  0559 07/29/22  1414 07/28/22  0408 07/27/22  2231 07/20/22  0923 07/19/22  2255   WBC 6.4 7.4 6.6   < >  --    < > 6.8   < >  --    HGB 10.5* 10.2* 9.4*   < >  --     < > 9.6*   < >  --    MCV 84 84 86   < >  --    < > 73*   < >  --    * 553* 638*   < >  --    < > 547*   < >  --    INR  --   --   --   --  1.34*  --  1.13  --  1.36*    < > = values in this interval not displayed.      Recent Labs   Lab Test 08/28/22  0723 08/28/22  0545 08/28/22  0411 08/28/22  0054 08/27/22  0816 08/27/22  0619 08/26/22  1237 08/26/22  0557 08/24/22  0809 08/24/22  0555 08/22/22  0809 08/22/22  0603   NA  --   --   --   --   --   --   --  138  --  138  --  137   POTASSIUM  --  3.9  --   --   --  3.8  --  3.8   < > 3.8   < > 3.4   CHLORIDE  --   --   --   --   --   --   --  107  --  108  --  106   CO2  --   --   --   --   --   --   --  29  --  26  --  26   BUN  --   --   --   --   --   --   --  12  --  12  --  13   CR  --   --   --   --   --   --   --  0.40*  --  0.33*  --  0.43*   ANIONGAP  --   --   --   --   --   --   --  2*  --  4  --  5   ADY  --   --   --   --   --   --   --  8.6  --  8.7  --  8.5   *  --  109* 143*   < >  --    < > 95   < > 124*   < > 118*    < > = values in this interval not displayed.     No lab results found.    Invalid input(s): TROP, TROPONINIES

## 2022-08-28 NOTE — PLAN OF CARE
Goal Outcome Evaluation:    Plan of Care Reviewed With: patient, grandchild(geetha)     Overall Patient Progress: no change       Reason for Admission: Cerebral abscess     Cognitive/Mentation: A/Ox 2, disoriented to time and situation. Confused at times. Very agitated and angry this morning. Verbal threats to staff, such as threatening to hit staff. Mood improved in the afternoon.Suicide precautions.  Neuros/CMS: Pt refused most of the assessment, no focal deficits noted.  VS: stable.   Tele: NSR.  GI: BS active, + flatus, rectal tube in place due to be removed on the 8/29/22  : voiding. Incontinent; purewick in place  Pulmonary: LS clear.  Pain: 10/10 headache and generalized pain.Tylenol given. Refused ice and heat. Repositioned. MD aware.     Drains/Lines: PICC  Skin: redness in khushbu/anal area  Activity: Assist x 2 with lift.  Diet: tube feedings     Discharge: LTACH vs TCU pending placement     Aggression Stoplight Tool: red this morning, yellow now     End of shift summary: Plan for psych consult, still awaiting the order.

## 2022-08-28 NOTE — PROGRESS NOTES
Care Management Follow Up    Length of Stay (days): 32    Expected Discharge Date: 08/29/2022     Concerns to be Addressed:       Patient plan of care discussed at interdisciplinary rounds: Yes    Anticipated Discharge Disposition: JOSI Diaz called with denial and insturctions to do a peer to peer review was  Suggested.  Call should be made to [phone] ext [phone].  SW in process o paging this out to hospitalist and pt reports suicidal thoughts and refusing cares.  She is now placed with a sitter and has a psych consult.  Discharge planning on hold at this time.    WILEY King  [facility]  Care Transitions  [phone]

## 2022-08-28 NOTE — PLAN OF CARE
Goal Outcome Evaluation:    Plan of Care Reviewed With: patient     Overall Patient Progress: improving     Patient is here with cerebral abscess/Sepsis ventriculostomy.A&Ox2.VSS ,Neuro's difficult to assess not following all commands. C/o HA managed with tylenol, melatonin and Seroquel given for sleep.sitter in the room for suicide risk. Sleeping in between care.Tele NSR . G tube site has some drainage cleaned x1.   Up with lift turn and repo x 2 hours patient refused T&R at times refused oral care, crusty and drainage from eyes used warm wash cloths to wipe secretions.Perineal  area is red barrier cream applied.Rectal tube with 100ml  output. Pure wick changed and had  600 ml out put this shift. RN tried to explain about the need for CT to evaluate HA  but patient was angry and adamantly refused it, plan to have family involvement in the decision making or psych consult .PICC line intact blood return noted in all lumens, Pending discharge to TCU vs LTACH

## 2022-08-28 NOTE — PLAN OF CARE
Goal Outcome Evaluation:    Plan of Care Reviewed With: patient     Overall Patient Progress: no change    Reason for Admission: cerebral abcess     Cognitive/Mentation: A/Ox 3, forgetful, confused at times. More pleasant this evening. No agitation.   Neuros/CMS: Intact ex generalized weakness RLE weaker--(2/5). Refuses parts of the neuro exam.   VS: stable on RA.   GI: BS active, continues to have loose stools. Rectal tube which needs to be removed tomorrow 8/29/22 per order.  : Purewick. Incontinent.  Pain: C/o severe HA pain & generalized pain-- declines ice/heat interventions. Scheduled tylenol provided. Able to rest after repositioning     Drains/Lines: R PICC  Skin: redness on perianal & perineum  Activity: Assist x 2 with lift.  Diet: NPO, TF running.      Discharge: LTACH vs TCU     Aggression Stoplight Tool: yellow     End of shift summary: psych consult tomorrow. Suicide precautions.

## 2022-08-29 ENCOUNTER — APPOINTMENT (OUTPATIENT)
Dept: OCCUPATIONAL THERAPY | Facility: CLINIC | Age: 77
DRG: 023 | End: 2022-08-29
Attending: INTERNAL MEDICINE
Payer: COMMERCIAL

## 2022-08-29 ENCOUNTER — APPOINTMENT (OUTPATIENT)
Dept: CT IMAGING | Facility: CLINIC | Age: 77
DRG: 023 | End: 2022-08-29
Attending: HOSPITALIST
Payer: COMMERCIAL

## 2022-08-29 LAB
ANION GAP SERPL CALCULATED.3IONS-SCNC: 4 MMOL/L (ref 3–14)
BUN SERPL-MCNC: 14 MG/DL (ref 7–30)
CALCIUM SERPL-MCNC: 8.9 MG/DL (ref 8.5–10.1)
CHLORIDE BLD-SCNC: 107 MMOL/L (ref 94–109)
CO2 SERPL-SCNC: 27 MMOL/L (ref 20–32)
CREAT SERPL-MCNC: 0.41 MG/DL (ref 0.52–1.04)
GFR SERPL CREATININE-BSD FRML MDRD: >90 ML/MIN/1.73M2
GLUCOSE BLD-MCNC: 104 MG/DL (ref 70–99)
GLUCOSE BLDC GLUCOMTR-MCNC: 115 MG/DL (ref 70–99)
GLUCOSE BLDC GLUCOMTR-MCNC: 118 MG/DL (ref 70–99)
GLUCOSE BLDC GLUCOMTR-MCNC: 130 MG/DL (ref 70–99)
GLUCOSE BLDC GLUCOMTR-MCNC: 140 MG/DL (ref 70–99)
GLUCOSE BLDC GLUCOMTR-MCNC: 99 MG/DL (ref 70–99)
GLUCOSE BLDC GLUCOMTR-MCNC: 99 MG/DL (ref 70–99)
MAGNESIUM SERPL-MCNC: 2.5 MG/DL (ref 1.6–2.3)
PHOSPHATE SERPL-MCNC: 2.4 MG/DL (ref 2.5–4.5)
POTASSIUM BLD-SCNC: 4 MMOL/L (ref 3.4–5.3)
SODIUM SERPL-SCNC: 138 MMOL/L (ref 133–144)

## 2022-08-29 PROCEDURE — 250N000011 HC RX IP 250 OP 636: Performed by: HOSPITALIST

## 2022-08-29 PROCEDURE — 250N000013 HC RX MED GY IP 250 OP 250 PS 637: Performed by: INTERNAL MEDICINE

## 2022-08-29 PROCEDURE — 258N000003 HC RX IP 258 OP 636: Performed by: INTERNAL MEDICINE

## 2022-08-29 PROCEDURE — 70450 CT HEAD/BRAIN W/O DYE: CPT

## 2022-08-29 PROCEDURE — 99233 SBSQ HOSP IP/OBS HIGH 50: CPT | Performed by: INTERNAL MEDICINE

## 2022-08-29 PROCEDURE — 250N000013 HC RX MED GY IP 250 OP 250 PS 637: Performed by: HOSPITALIST

## 2022-08-29 PROCEDURE — 97535 SELF CARE MNGMENT TRAINING: CPT | Mod: GO

## 2022-08-29 PROCEDURE — 80048 BASIC METABOLIC PNL TOTAL CA: CPT | Performed by: HOSPITALIST

## 2022-08-29 PROCEDURE — 99232 SBSQ HOSP IP/OBS MODERATE 35: CPT | Performed by: HOSPITALIST

## 2022-08-29 PROCEDURE — 999N000190 HC STATISTIC VAT ROUNDS

## 2022-08-29 PROCEDURE — 84100 ASSAY OF PHOSPHORUS: CPT | Performed by: HOSPITALIST

## 2022-08-29 PROCEDURE — 258N000003 HC RX IP 258 OP 636: Performed by: HOSPITALIST

## 2022-08-29 PROCEDURE — 120N000001 HC R&B MED SURG/OB

## 2022-08-29 PROCEDURE — 250N000011 HC RX IP 250 OP 636: Performed by: INTERNAL MEDICINE

## 2022-08-29 PROCEDURE — 83735 ASSAY OF MAGNESIUM: CPT | Performed by: HOSPITALIST

## 2022-08-29 RX ORDER — LORAZEPAM 2 MG/ML
0.5 INJECTION INTRAMUSCULAR ONCE
Status: COMPLETED | OUTPATIENT
Start: 2022-08-29 | End: 2022-08-29

## 2022-08-29 RX ORDER — QUETIAPINE FUMARATE 25 MG/1
25 TABLET, FILM COATED ORAL AT BEDTIME
Status: DISCONTINUED | OUTPATIENT
Start: 2022-08-29 | End: 2022-08-31

## 2022-08-29 RX ADMIN — Medication 40 MG: at 09:47

## 2022-08-29 RX ADMIN — QUETIAPINE FUMARATE 25 MG: 25 TABLET ORAL at 21:04

## 2022-08-29 RX ADMIN — LEVETIRACETAM 1000 MG: 100 SOLUTION ORAL at 09:48

## 2022-08-29 RX ADMIN — INSULIN ASPART 1 UNITS: 100 INJECTION, SOLUTION INTRAVENOUS; SUBCUTANEOUS at 12:20

## 2022-08-29 RX ADMIN — VANCOMYCIN HYDROCHLORIDE 125 MG: KIT at 17:34

## 2022-08-29 RX ADMIN — POTASSIUM & SODIUM PHOSPHATES POWDER PACK 280-160-250 MG 1 PACKET: 280-160-250 PACK at 17:34

## 2022-08-29 RX ADMIN — LEVETIRACETAM 1000 MG: 100 SOLUTION ORAL at 21:04

## 2022-08-29 RX ADMIN — POTASSIUM & SODIUM PHOSPHATES POWDER PACK 280-160-250 MG 1 PACKET: 280-160-250 PACK at 12:24

## 2022-08-29 RX ADMIN — MICONAZOLE NITRATE: 20 CREAM TOPICAL at 21:05

## 2022-08-29 RX ADMIN — MEROPENEM 2 G: 1 INJECTION, POWDER, FOR SOLUTION INTRAVENOUS at 12:21

## 2022-08-29 RX ADMIN — VANCOMYCIN HYDROCHLORIDE 125 MG: KIT at 21:04

## 2022-08-29 RX ADMIN — Medication 1 PACKET: at 17:39

## 2022-08-29 RX ADMIN — VANCOMYCIN HYDROCHLORIDE 125 MG: KIT at 09:46

## 2022-08-29 RX ADMIN — VANCOMYCIN HYDROCHLORIDE 125 MG: KIT at 12:24

## 2022-08-29 RX ADMIN — Medication 1 PACKET: at 12:24

## 2022-08-29 RX ADMIN — Medication 15 ML: at 09:47

## 2022-08-29 RX ADMIN — MEROPENEM 2 G: 1 INJECTION, POWDER, FOR SOLUTION INTRAVENOUS at 22:19

## 2022-08-29 RX ADMIN — TOPIRAMATE 50 MG: 50 TABLET ORAL at 21:04

## 2022-08-29 RX ADMIN — ACETAMINOPHEN 650 MG: 325 TABLET ORAL at 09:55

## 2022-08-29 RX ADMIN — MEROPENEM 2 G: 1 INJECTION, POWDER, FOR SOLUTION INTRAVENOUS at 03:05

## 2022-08-29 RX ADMIN — MICONAZOLE NITRATE: 20 CREAM TOPICAL at 09:55

## 2022-08-29 RX ADMIN — OXCARBAZEPINE 450 MG: 300 SUSPENSION ORAL at 21:06

## 2022-08-29 RX ADMIN — LORAZEPAM 0.5 MG: 2 INJECTION INTRAMUSCULAR at 18:54

## 2022-08-29 RX ADMIN — ACETAMINOPHEN 650 MG: 325 TABLET ORAL at 17:34

## 2022-08-29 RX ADMIN — Medication 1 PACKET: at 09:46

## 2022-08-29 RX ADMIN — ACETAMINOPHEN 650 MG: 325 SUSPENSION ORAL at 13:08

## 2022-08-29 RX ADMIN — VANCOMYCIN HYDROCHLORIDE 1500 MG: 10 INJECTION, POWDER, LYOPHILIZED, FOR SOLUTION INTRAVENOUS at 13:08

## 2022-08-29 RX ADMIN — ACETAMINOPHEN 650 MG: 325 TABLET ORAL at 21:04

## 2022-08-29 RX ADMIN — QUETIAPINE FUMARATE 25 MG: 25 TABLET ORAL at 03:04

## 2022-08-29 RX ADMIN — POTASSIUM & SODIUM PHOSPHATES POWDER PACK 280-160-250 MG 1 PACKET: 280-160-250 PACK at 09:55

## 2022-08-29 ASSESSMENT — ACTIVITIES OF DAILY LIVING (ADL)
ADLS_ACUITY_SCORE: 44
ADLS_ACUITY_SCORE: 44
ADLS_ACUITY_SCORE: 40
ADLS_ACUITY_SCORE: 44
ADLS_ACUITY_SCORE: 44
ADLS_ACUITY_SCORE: 48
ADLS_ACUITY_SCORE: 44
ADLS_ACUITY_SCORE: 40
ADLS_ACUITY_SCORE: 44
ADLS_ACUITY_SCORE: 40

## 2022-08-29 NOTE — PLAN OF CARE
Goal Outcome Evaluation:          Overall Patient Progress: no change    Outcome Evaluation: Pt tolerating TF at goal rate: Vital 1.5 @ 50 mL/hr.  No changes recommended at this time.

## 2022-08-29 NOTE — PROVIDER NOTIFICATION
Brief update    Paged re: insomnia    Added 25 seroquel HS, as patient apparently responded well to this yesterday night    Note order for melatonin includes text for a stepwise treatment plan with escalation to Benadryl as step two, temazepam, etc. these other medications are not ordered, and I wonder if this is a templated order as generally would avoid some of these other medications if there is concern for delirium in the elderly.  As such, I have not followed to this described order.  Will defer to rounding team to determine if this order is intentional and if they would prefer Seroquel be stopped.    Brody Baron MD  4:20 AM

## 2022-08-29 NOTE — PROGRESS NOTES
CLINICAL NUTRITION SERVICES - REASSESSMENT NOTE      Future/Additional Recommendations:     Recommend continue with current EN regimen     Malnutrition: (8/25)  % Weight Loss: > 10% in 6 months (severe malnutrition) - per 8/4 RD note  % Intake:  No decreased intake noted - pt meeting needs from EN  Subcutaneous Fat Loss:  Orbital region moderate depletion - per 8/4 RD note  Muscle Loss:  Temporal region moderate depletion, Clavicle bone region moderate depletion, Acromion bone region moderate-severe depletion and Dorsal hand region severe depletion - per 8/4 RD note  Fluid Retention:  None noted     Malnutrition Diagnosis: Severe malnutrition  In Context of:  Acute illness or injury  Chronic illness or disease       EVALUATION OF PROGRESS TOWARD GOALS   Diet:    NPO    Nutrition Support:    Type of Feeding Tube: 18 Fr PEG (placed 8/16)  Enteral Frequency:  Continuous  Enteral Regimen: Vital 1.5 @ 50 mL/hr  Total Enteral Provisions: 1800 cals, 82 gm pro (1.7 gm/kg), 7 gm fiber, 917 mL free water, 224 gm CHO  1 packet Banatrol TID = 120 cals, 6 gm fiber  Free Water Flush: 200 mL every 4 hrs (8/20 - MD order)     8/15: Banatrol TID = 120 cals, 6 gm pro   T: 1920 (40 cals/kg), 31 gm fiber, 2117 mL free water     Liquid MVI/M daily     Intake/Tolerance:    Chart reviewed    Stool Output:  8/28: 300 mL  8/27: 200 mL  8/26: 400 mL    Possible removal of rectal tube today    8/29: Na 138           K 4.0           Mg 2.4 (H)           Phos 2.4 (L)      ASSESSED NUTRITION NEEDS:  Dosing Weight 48.3 kg (7/27 - admit wt)  Estimated Energy Needs: 3637-0127 kcals (35-40 Kcal/Kg)  Justification: repletion and underweight  Estimated Protein Needs: 70-95 grams protein (1.5-2 g pro/Kg)  Justification: repletion       NEW FINDINGS:     Wt has fluctuated    08/25/22 0610 48.3 kg (106 lb 7.7 oz) Bed scale   08/24/22 0629 48.3 kg (106 lb 7.7 oz) Bed scale   08/22/22 0933 56 kg (123 lb 7.3 oz) --   08/15/22 0400 51.8 kg (114 lb 4.8 oz)  Bed scale   08/14/22 0500 55.7 kg (122 lb 12.7 oz) Bed scale   08/13/22 0600 59.6 kg (131 lb 6.3 oz) Bed scale   08/12/22 0600 53.8 kg (118 lb 9.7 oz) Bed scale   08/11/22 0600 55 kg (121 lb 4.1 oz) Bed scale   08/10/22 0408 55.2 kg (121 lb 11.1 oz) Bed scale   08/09/22 0400 51.1 kg (112 lb 10.5 oz) Bed scale   08/08/22 0500 54.6 kg (120 lb 5.9 oz) Bed scale   08/06/22 0600 53 kg (116 lb 13.5 oz) --   08/05/22 0500 53.4 kg (117 lb 11.6 oz) Bed scale   08/04/22 0700 53.1 kg (117 lb 1 oz) Bed scale   08/03/22 0500 53.4 kg (117 lb 11.6 oz) Bed scale   08/02/22 0000 53.5 kg (117 lb 15.1 oz) Bed scale   08/01/22 0000 51.5 kg (113 lb 8.6 oz) Bed scale   07/31/22 0648 55.2 kg (121 lb 11.1 oz) Bed scale   07/30/22 0134 53.2 kg (117 lb 4.6 oz) Bed scale   07/28/22 0600 49.5 kg (109 lb 2 oz) Bed scale   07/28/22 0000 -- Bed scale   07/27/22 2100 48.3 kg (106 lb 7.7 oz) Bed scale         Previous Goals (8/25):   1. EN to meet % estimated needs  Evaluation: Met    2. Stooling <500 mL/day  Evaluation: Met    Previous Nutrition Diagnosis (8/25):   No nutrition diagnosis identified at this time  Evaluation: No change        CURRENT NUTRITION DIAGNOSIS  No nutrition diagnosis identified at this time    INTERVENTIONS  Recommendations / Nutrition Prescription  NPO    Recommend continue with current EN regimen      Goals  EN to meet % estimated needs      MONITORING AND EVALUATION:  Progress towards goals will be monitored and evaluated per protocol and Practice Guidelines

## 2022-08-29 NOTE — PROGRESS NOTES
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 Has been on broad spectrum antibiotics, now on Meropenem/Vancomycin, mental status improved, s/p left frontal ventriculostomy  CSF cxs have remained negative. Course complicated by development of C.diff colitis.   Headache is back, no too bad and improves with tylenol. Discussed need for imaging.      Recommendations  1. Continue current antimicrobial therapy Vancomycin, Meropenem.  6 week treatment planned until 9/8  2. Recommend repeat imaging, discussed with IM< plan is for repeat CT will follow       Cayetano Schmitt MD    Interval History   Does not complain of headache to me but per report back   Does not complaint much   Labs and vancomycin levels reviewed     Afebrile   Physical Exam   Temp: 97.5  F (36.4  C) Temp src: Oral BP: 106/54 Pulse: 91   Resp: 18 SpO2: 98 % O2 Device: None (Room air)    Vitals:    08/22/22 0933 08/24/22 0629 08/25/22 0610   Weight: 56 kg (123 lb 7.3 oz) 48.3 kg (106 lb 7.7 oz) 48.3 kg (106 lb 7.7 oz)     Vital Signs with Ranges  Temp:  [97.3  F (36.3  C)-98.2  F (36.8  C)] 97.5  F (36.4  C)  Pulse:  [82-91] 91  Resp:  [16-20] 18  BP: ()/(54-60) 106/54  SpO2:  [94 %-98 %] 98 %    Constitutional:awake, does not complaint of headache today , responds appropriately, moves all extremities  Lungs: Clear to auscultation bilaterally, no crackles or wheezing  Cardiovascular: Regular rate and rhythm, normal S1 and S2, and no murmur noted  Abdomen: soft  Skin: No rash    Other:    Medications     - MEDICATION INSTRUCTIONS -         acetaminophen  650 mg Oral TID     banatrol plus  1 packet Per Feeding Tube TID w/meals     insulin aspart  1-6 Units Subcutaneous Q4H     insulin glargine  10 Units Subcutaneous At  Bedtime     levETIRAcetam  1,000 mg Oral or Feeding Tube Q12H     meropenem  2 g Intravenous Q8H     miconazole with skin protectant   Topical BID     multivitamins w/minerals  15 mL Per Feeding Tube Daily     OXcarbazepine  450 mg Oral or Feeding Tube At Bedtime     pantoprazole  40 mg Oral or Feeding Tube QAM AC     potassium & sodium phosphates  1 packet Oral or Feeding Tube Q4H     QUEtiapine  25 mg Oral At Bedtime     sodium chloride (PF)  10-40 mL Intracatheter Q7 Days     topiramate  50 mg Oral or Feeding Tube At Bedtime     vancomycin  125 mg Oral or Feeding Tube 4x Daily     vancomycin  1,500 mg Intravenous Q24H       Data   All microbiology laboratory data reviewed.  Recent Labs   Lab Test 08/26/22  0557 08/23/22  1920 08/21/22  0514   WBC 6.4 7.4 6.6   HGB 10.5* 10.2* 9.4*   HCT 36.1 35.5 33.6*   MCV 84 84 86   * 553* 638*     Recent Labs   Lab Test 08/29/22  0610 08/28/22  0545 08/26/22  0557   CR 0.41* 0.39* 0.40*     Recent Labs   Lab Test 08/08/20  0212   SED 13

## 2022-08-29 NOTE — PLAN OF CARE
Pt alert. Refused to answer orientation questions. C/o headache, which improved slightly with tylenol.  Pt uncooperative with full neuro assessment, but appears to move all extremities equally.  Vitals stable, room air.  Tele NSR. Assist x 2 to repo. Had BM x 1, loose incontinent. Ok to leave out rectal tube per hospitalist. Purewick in place. TF at goal rate of 50 ml/hr. Pt tolerating fine, no gastric residuals  or n/v.  Pt agreed to CT scan this afternoon. She asked for anxiety meds prior to scan, and received 1x order for IV ativan to be given before going down. 1:1 for suicidal ideation.    Goal Outcome Evaluation:    Plan of Care Reviewed With: patient

## 2022-08-29 NOTE — PROGRESS NOTES
[facility]  Hospitalist Progress Note        Brian Mcneill MD   08/29/2022        Interval History:        - rectal tube accidentally dislodged, had loose stool X 2  - added Seroquel 25 mg bedtime for sleep/insomnia  - nutrition following for tube feeds  - patient calm, awake but does not engage in much conversation  - awaiting psychiatry evaluation; sitter in room         Assessment and Plan:        Julisa Chaney is a 77-year-old woman with PMH of trigeminal neuralgia who was recently admitted to [facility] on 7/19 for severe sepsis due to COVID-19 infection and suspected bacterial pneumonia. She was treated with 5 days of remdesivir and did not require any steroids, also treated with IV vancomycin and IV meropenem.     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  She was switched to IV vancomycin, cefepime and metronidazole and transferred to [facility] for neurosurgery assessment. MRI brain w/wo contrast 7/30 showed ventriculitis with probable abscess. Neurosurgery/neurology/infectious disease/oncology consulted. Patient taken to the OR on 7/31 for left frontal ventriculostomy.   Since then has remained on broad spectrum antibiotics.  In addition she has developed C. Difficile colitis and is on treatment.  She has continued to have a significant encephalopathy.     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  - flow cytometry was done which did not show any evidence of malignancy.  - s/p Left frontal ventriculostomy on 7/31/2022, EVD removed 8/8; CSF cultures have remained  "negative; CSF counts decreasing 469---233 ---56 (last CSF from 8/18)  - last head CT from 8/19 with stable presumed vasogenic edema in the white matter of the anterior inferior left frontal lobe related to ventriculitis of the left lateral ventricle; stable ventriculomegaly of the left lateral ventricle  - neurosurgery signed off 8/27  - ID following  - remains afebrile; wbc 21--->leukocytosis resolved ; was initially on vancomycin, cefepime, and metronidazole-- then Meropenem and Vancomycin; ID following, plan for 6 weeks treatment until 9/8/22    - has been having mild persistent headaches; on 8/27 reevaluated by neurosurgery and recommended head CT but patient refusing any scans  - continue scheduled Tylenol 650 mg TID  - trying to avoid any narcotics with her confusion/ encephalopathy     - 8/28, d/w her daughter Alissa regarding patient's decision of not having the scan; at this point not sure if patient is decisional; psych to eval; if no significant improvement in mood/agitation with psych eval, then will need to discuss with neurosurgery about head imagings and if strongly recommended, will probably need CT/MRI under sedation/anesthesia with family's consent    Addendum:  - per nursing patient now agrees for CT head but would like something for anxiety; will order Ativan prior to CT     Acute infectious encephalopathy due to above:  - mentation much improved since admission ; fluctuating at times ; will not engage in conversation most of the times  - Continue to treat underlying issues as noted above.    - Maintain normal sleep/wake cycle as able.  - 8/23 discontinued prn benadryl, narcotics; minimize narcotic; if needed for headache, will plan to order as one time dose rather than prn available    Suicidal ideations/ agitation  - with improvement in her mentation, she has started becoming at times restless and agitated  - on 8/27, reported being suicidal, when asked about plan, she states \"I am not going to " "tell you\"  - consulted Psychiatry --pending; suicide precautions; sitter in room     C. difficile colitis:  Patient was noted to have copious diarrhea on 8/1 and was noted positive for C. Difficile.  -Continue oral vancomycin.  Will need a prolonged treatment given other abx use.  - Rectal tube came off 8/28; monitor clinically  - ID following as noted above.     Acute on chronic anemia:  - Prior labs show baseline hemoglobin of about 9-10.   - Hb on admission was 11.7, slowly trended down, got 1 unit PRBC on 8/8/22 for Hb 6.9  - No obvious ongoing bleeding noted; Hb has now remained stable around 9 to 10  - iron panel on 7/30/22 showed that her total iron was low at 14, binding capacity was low at 28, iron saturation was 6 and B12 was high and folate was normal  - received IV iron infusion x3 with first on 8/11/22.  -Continue to monitor hgb      History of trigeminal neuralgia  - Continue PTA Trileptal , Keppra and Topamax.     History of chronic pyloric ulcer  She had EGD done in 2020 which showed gastroduodenitis with a duodenal stricture.  - Continue PPI.     Hyperglycemia  Recent HbA1c was 5.6 on 7/19/22. Was not on any medication prior to admission.  - Continue medium dose sliding scale insulin every 4 hours while on tube feeds  - started on Lantus 10 units bedtime, continue .      Intermittent Hypokalemia, Hypo/hypernatremia, Hypophosphatemia:  - Recheck and replace per protocol.  - Continue to follow trend  - hypernatremia improved; continue free water flushes 200 ml  every 4 hrs     Severe protein calorie malnutrition:  - Nutrition following for tube feeds via G tube (placed 8/16) ; getting free water flushes  - Will need SLP consult when more able to participate     Physical deconditioning:  - will need TCU placement; declined by LTACH; continue therapy as able to participate  - SW following for disposition    Diet: NPO for Medical/Clinical Reasons Except for: Other; Specify: NPO For oral intake and " gastric feedings for 6 hours post insertion Gastrostomy G/GJ tube. May feed through Jejunal or Distal PORT immediately for GJ tubes ONLY.  Adult Formula Drip Feeding: Continuous Vital 1.5; Gastrostomy; Goal Rate: 50; mL/hr; Medication - Feeding Tube Flush Frequency: At least 15-30 mL water before and after medication administration and with tube clogging; Amount to Send (Nutrition us...      DVT Prophylaxis: Pneumatic Compression Devices    Central Lines: PRESENT  PICC Triple Lumen 07/22/22 Right Basilic Vascular access-Site Assessment: WDL    Code Status: Full Code          Disposition Plan    Expected Discharge Date: unclear at this time; pending clinical improvement  - planned for TCU; declined by LTACH; SW following for disposition    Clinically Significant Risk Factors Present on Admission                         Page Me (7 am to 6 pm)    Care plan discussed with patient and nursing; on 8/28 had also discussed extensively with her daughter Alissa over the phone              Physical Exam:      Blood pressure 130/60, pulse 85, temperature 97.4  F (36.3  C), temperature source Oral, resp. rate 18, weight 48.3 kg (106 lb 7.7 oz), SpO2 96 %, not currently breastfeeding.  Vitals:    08/22/22 0933 08/24/22 0629 08/25/22 0610   Weight: 56 kg (123 lb 7.3 oz) 48.3 kg (106 lb 7.7 oz) 48.3 kg (106 lb 7.7 oz)     Vital Signs with Ranges  Temp:  [97.3  F (36.3  C)-98.2  F (36.8  C)] 97.4  F (36.3  C)  Pulse:  [82-86] 85  Resp:  [16-20] 18  BP: ()/(55-62) 130/60  SpO2:  [94 %-97 %] 96 %  I/O's Last 24 hours  I/O last 3 completed shifts:  In: 1420 [NG/GT:1420]  Out: 600 [Urine:300; Stool:300]    Constitutional: alert, awake and conversant; oriented to self and place; follows commands; moving all extremities equally; in no apparent distress; does not engage in conversation   HEENT: Pupils equal and reactive to light and accomodation, neck supple    Oral cavity: Dry oral mucosa   Cardiovascular: Normal s1 s2, regular rate  and rhythm, no murmur   Lungs: B/l clear to auscultation, no wheezes or crepitations   Abdomen: Soft, nt, nd, no guarding, rigidity or rebound; BS +   LE : No edema   Musculoskeletal: moving all extremities equally   Neuro: No focal neurological deficits noted   Psych: flat affect, agitated at times                Medications:          acetaminophen  650 mg Oral TID     banatrol plus  1 packet Per Feeding Tube TID w/meals     insulin aspart  1-6 Units Subcutaneous Q4H     insulin glargine  10 Units Subcutaneous At Bedtime     levETIRAcetam  1,000 mg Oral or Feeding Tube Q12H     meropenem  2 g Intravenous Q8H     miconazole with skin protectant   Topical BID     multivitamins w/minerals  15 mL Per Feeding Tube Daily     OXcarbazepine  450 mg Oral or Feeding Tube At Bedtime     pantoprazole  40 mg Oral or Feeding Tube QAM AC     QUEtiapine  25 mg Oral At Bedtime     sodium chloride (PF)  10-40 mL Intracatheter Q7 Days     topiramate  50 mg Oral or Feeding Tube At Bedtime     vancomycin  125 mg Oral or Feeding Tube 4x Daily     vancomycin  1,500 mg Intravenous Q24H     PRN Meds: acetaminophen **OR** acetaminophen, artificial tears ophthalmic solution, glucose **OR** dextrose **OR** glucagon, guaiFENesin, ipratropium - albuterol 0.5 mg/2.5 mg/3 mL, - MEDICATION INSTRUCTIONS -, melatonin, metoprolol, miconazole with skin protectant, naloxone **OR** naloxone **OR** naloxone **OR** naloxone, ondansetron **OR** ondansetron, prochlorperazine **OR** prochlorperazine **OR** prochlorperazine, sodium chloride (PF), sodium chloride (PF)         Data:      All new lab and imaging data was reviewed.   Recent Labs   Lab Test 08/26/22  0557 08/23/22  1920 08/21/22  0514 07/30/22  0559 07/29/22  1414 07/28/22  0408 07/27/22  2231 07/20/22  0923 07/19/22  2255   WBC 6.4 7.4 6.6   < >  --    < > 6.8   < >  --    HGB 10.5* 10.2* 9.4*   < >  --    < > 9.6*   < >  --    MCV 84 84 86   < >  --    < > 73*   < >  --    * 553* 638*   <  >  --    < > 547*   < >  --    INR  --   --   --   --  1.34*  --  1.13  --  1.36*    < > = values in this interval not displayed.      Recent Labs   Lab Test 08/29/22  0610 08/29/22  0605 08/29/22  0003 08/28/22  0723 08/28/22  0545 08/27/22  0816 08/27/22  0619 08/26/22  1237 08/26/22  0557 08/24/22  0809 08/24/22  0555     --   --   --   --   --   --   --  138  --  138   POTASSIUM 4.0  --   --   --  3.9  --  3.8  --  3.8   < > 3.8   CHLORIDE 107  --   --   --   --   --   --   --  107  --  108   CO2 27  --   --   --   --   --   --   --  29  --  26   BUN 14  --   --   --   --   --   --   --  12  --  12   CR 0.41*  --   --   --  0.39*  --   --   --  0.40*  --  0.33*   ANIONGAP 4  --   --   --   --   --   --   --  2*  --  4   ADY 8.9  --   --   --   --   --   --   --  8.6  --  8.7   * 99 130*   < >  --    < >  --    < > 95   < > 124*    < > = values in this interval not displayed.     No lab results found.    Invalid input(s): TROP, TROPONINIES

## 2022-08-30 LAB
GLUCOSE BLDC GLUCOMTR-MCNC: 106 MG/DL (ref 70–99)
GLUCOSE BLDC GLUCOMTR-MCNC: 107 MG/DL (ref 70–99)
GLUCOSE BLDC GLUCOMTR-MCNC: 109 MG/DL (ref 70–99)
GLUCOSE BLDC GLUCOMTR-MCNC: 112 MG/DL (ref 70–99)
GLUCOSE BLDC GLUCOMTR-MCNC: 119 MG/DL (ref 70–99)
GLUCOSE BLDC GLUCOMTR-MCNC: 135 MG/DL (ref 70–99)
PHOSPHATE SERPL-MCNC: 3 MG/DL (ref 2.5–4.5)

## 2022-08-30 PROCEDURE — 250N000013 HC RX MED GY IP 250 OP 250 PS 637: Performed by: HOSPITALIST

## 2022-08-30 PROCEDURE — 258N000003 HC RX IP 258 OP 636: Performed by: INTERNAL MEDICINE

## 2022-08-30 PROCEDURE — 999N000190 HC STATISTIC VAT ROUNDS

## 2022-08-30 PROCEDURE — 250N000011 HC RX IP 250 OP 636: Performed by: INTERNAL MEDICINE

## 2022-08-30 PROCEDURE — 250N000013 HC RX MED GY IP 250 OP 250 PS 637: Performed by: INTERNAL MEDICINE

## 2022-08-30 PROCEDURE — 99233 SBSQ HOSP IP/OBS HIGH 50: CPT | Performed by: INTERNAL MEDICINE

## 2022-08-30 PROCEDURE — 258N000003 HC RX IP 258 OP 636: Performed by: HOSPITALIST

## 2022-08-30 PROCEDURE — 84100 ASSAY OF PHOSPHORUS: CPT | Performed by: INTERNAL MEDICINE

## 2022-08-30 PROCEDURE — 99233 SBSQ HOSP IP/OBS HIGH 50: CPT | Performed by: HOSPITALIST

## 2022-08-30 PROCEDURE — 250N000011 HC RX IP 250 OP 636: Performed by: HOSPITALIST

## 2022-08-30 PROCEDURE — 120N000001 HC R&B MED SURG/OB

## 2022-08-30 RX ORDER — WATER 10 ML/10ML
INJECTION INTRAMUSCULAR; INTRAVENOUS; SUBCUTANEOUS
Status: DISPENSED
Start: 2022-08-30 | End: 2022-08-31

## 2022-08-30 RX ADMIN — Medication 40 MG: at 09:53

## 2022-08-30 RX ADMIN — Medication 1 PACKET: at 17:34

## 2022-08-30 RX ADMIN — TOPIRAMATE 50 MG: 50 TABLET ORAL at 22:13

## 2022-08-30 RX ADMIN — QUETIAPINE FUMARATE 25 MG: 25 TABLET ORAL at 22:13

## 2022-08-30 RX ADMIN — Medication 15 ML: at 09:53

## 2022-08-30 RX ADMIN — LEVETIRACETAM 1000 MG: 100 SOLUTION ORAL at 20:04

## 2022-08-30 RX ADMIN — VANCOMYCIN HYDROCHLORIDE 125 MG: KIT at 17:34

## 2022-08-30 RX ADMIN — Medication 1 PACKET: at 09:53

## 2022-08-30 RX ADMIN — VANCOMYCIN HYDROCHLORIDE 125 MG: KIT at 13:08

## 2022-08-30 RX ADMIN — ACETAMINOPHEN 650 MG: 325 SUSPENSION ORAL at 04:04

## 2022-08-30 RX ADMIN — MICONAZOLE NITRATE: 20 CREAM TOPICAL at 09:59

## 2022-08-30 RX ADMIN — MICONAZOLE NITRATE: 20 CREAM TOPICAL at 20:04

## 2022-08-30 RX ADMIN — Medication 1 PACKET: at 13:08

## 2022-08-30 RX ADMIN — Medication 1 DROP: at 19:58

## 2022-08-30 RX ADMIN — LEVETIRACETAM 1000 MG: 100 SOLUTION ORAL at 09:52

## 2022-08-30 RX ADMIN — ACETAMINOPHEN 650 MG: 325 TABLET ORAL at 09:53

## 2022-08-30 RX ADMIN — VANCOMYCIN HYDROCHLORIDE 125 MG: KIT at 22:11

## 2022-08-30 RX ADMIN — MEROPENEM 2 G: 1 INJECTION, POWDER, FOR SOLUTION INTRAVENOUS at 13:08

## 2022-08-30 RX ADMIN — OXCARBAZEPINE 450 MG: 300 SUSPENSION ORAL at 22:12

## 2022-08-30 RX ADMIN — VANCOMYCIN HYDROCHLORIDE 125 MG: KIT at 09:52

## 2022-08-30 RX ADMIN — Medication 1 DROP: at 10:09

## 2022-08-30 RX ADMIN — MEROPENEM 2 G: 1 INJECTION, POWDER, FOR SOLUTION INTRAVENOUS at 20:04

## 2022-08-30 RX ADMIN — VANCOMYCIN HYDROCHLORIDE 1500 MG: 10 INJECTION, POWDER, LYOPHILIZED, FOR SOLUTION INTRAVENOUS at 13:08

## 2022-08-30 RX ADMIN — MEROPENEM 2 G: 1 INJECTION, POWDER, FOR SOLUTION INTRAVENOUS at 03:52

## 2022-08-30 ASSESSMENT — ACTIVITIES OF DAILY LIVING (ADL)
ADLS_ACUITY_SCORE: 48
ADLS_ACUITY_SCORE: 44
ADLS_ACUITY_SCORE: 42
ADLS_ACUITY_SCORE: 48
ADLS_ACUITY_SCORE: 48
ADLS_ACUITY_SCORE: 42
ADLS_ACUITY_SCORE: 44
ADLS_ACUITY_SCORE: 44
ADLS_ACUITY_SCORE: 42
ADLS_ACUITY_SCORE: 44

## 2022-08-30 NOTE — PROGRESS NOTES
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 Has been on broad spectrum antibiotics, now on Meropenem/Vancomycin, mental status improved, s/p left frontal ventriculostomy CSF cxs have remained negative. Course complicated by development of C.diff colitis.   Headache is back, not too bad and improves with tylenol. Discussed need for imaging. S/P CT scan: 8/ 29 IMPRESSION:  1.  The previously noted hydrocephalic left lateral ventricle has decreased in size. The left temporal horn remains slightly dilated but no samuel hydrocephalus.  2.  No acute findings.     Recommendations  1. Continue current antimicrobial therapy Vancomycin, Meropenem.  6 week treatment planned until 9/8  2. CT scan reviewed, no changes based on the CT scan.       Cayetano Schmitt MD    Interval History   Does not complain of headache to me but per report back   Does not complaint much   Labs and vancomycin levels reviewed   Ct scan reviewed   No changes to ROS  No changes to Social history, Family history and medical history     Afebrile   Physical Exam   Temp: 98.3  F (36.8  C) Temp src: Oral BP: 101/46 Pulse: 85   Resp: 16 SpO2: 98 % O2 Device: None (Room air)    Vitals:    08/22/22 0933 08/24/22 0629 08/25/22 0610   Weight: 56 kg (123 lb 7.3 oz) 48.3 kg (106 lb 7.7 oz) 48.3 kg (106 lb 7.7 oz)     Vital Signs with Ranges  Temp:  [96.8  F (36  C)-98.3  F (36.8  C)] 98.3  F (36.8  C)  Pulse:  [78-86] 85  Resp:  [16-18] 16  BP: (101-116)/(46-65) 101/46  SpO2:  [97 %-99 %] 98 %    Constitutional:awake, does not complaint of headache today , responds appropriately, moves all extremities  Lungs: Clear to auscultation bilaterally, no crackles or wheezing  Cardiovascular: Regular rate and rhythm, normal S1 and S2, and no  murmur noted  Abdomen: soft  Skin: No rash    Other:    Medications     - MEDICATION INSTRUCTIONS -         acetaminophen  650 mg Oral TID     banatrol plus  1 packet Per Feeding Tube TID w/meals     insulin aspart  1-6 Units Subcutaneous Q4H     insulin glargine  10 Units Subcutaneous At Bedtime     levETIRAcetam  1,000 mg Oral or Feeding Tube Q12H     meropenem  2 g Intravenous Q8H     miconazole with skin protectant   Topical BID     multivitamins w/minerals  15 mL Per Feeding Tube Daily     OXcarbazepine  450 mg Oral or Feeding Tube At Bedtime     pantoprazole  40 mg Oral or Feeding Tube QAM AC     QUEtiapine  25 mg Oral At Bedtime     sodium chloride (PF)  10-40 mL Intracatheter Q7 Days     topiramate  50 mg Oral or Feeding Tube At Bedtime     vancomycin  125 mg Oral or Feeding Tube 4x Daily     vancomycin  1,500 mg Intravenous Q24H       Data   All microbiology laboratory data reviewed.  Recent Labs   Lab Test 08/26/22  0557 08/23/22  1920 08/21/22  0514   WBC 6.4 7.4 6.6   HGB 10.5* 10.2* 9.4*   HCT 36.1 35.5 33.6*   MCV 84 84 86   * 553* 638*     Recent Labs   Lab Test 08/29/22  0610 08/28/22  0545 08/26/22  0557   CR 0.41* 0.39* 0.40*     Recent Labs   Lab Test 08/08/20  0212   SED 13

## 2022-08-30 NOTE — PLAN OF CARE
Goal Outcome Evaluation:    Plan of Care Reviewed With: patient     Overall Patient Progress: no change       Reason for Admission: Cerebral abscess     Cognitive/Mentation: A/Ox 2, disoriented to time and situation. Confused at times. Suicide precautions.  Neuros/CMS:  refused parts of the assessment, no focal deficits noted, generalized weakness.  VS: stable.   Tele: NSR.  GI: BS active, + flatus. Incontinent.  : voiding. Incontinent.  Pulmonary: LS clear, diminished.  Pain: 10/10 headache and generalized pain.Tylenol given. Repositioned.      Drains/Lines: PICC, no blood return on white lumen.  Skin: redness in khushbu/anal area.   Activity: Assist x 2 with lift.  Diet: tube feedings     Discharge: LTACH vs TCU pending placement     Aggression Stoplight Tool: green     End of shift summary: Psych recs in. Paged for alteplase, still awaiting order.

## 2022-08-30 NOTE — CONSULTS
"Triage and Transition - Consult and Liaison     Julisa Chaney  August 30, 2022    Session start: 1035   Session end: 1046  Session duration in minutes: 11 minutes  CPT utilized: 07260 - Brief diagnostic assessment (modifier 52)  Patient was seen virtually ([employer] cart or other teleconferencing device).    Diagnosis:     Adjustment Disorders  309.28 (F43.23) With mixed anxiety and depressed mood, present;    296.32 (F33.1) Major Depressive Disorder, Recurrent Episode, Moderate _ and With melancholic features, by history;     Plan:     After completing screeners with patient and reviewing collateral information, it appears patient does not display complex medical decision making capacity at this time. Recommended  find surrogate decision maker by contacting next of kin. If next of kin is unable to be found, hospital is able to act in patient's best interest.     Continue care coordination with care team.     LM for psychiatric medication provider to review possible medication interventions for depression and anxiety.     Maintain current transition plan.     She endorses thoughts of \"jumping from a building\" without intent, plan or means.  Do not believe that SP is necessary or sitter.     Reason for consult: Psychiatry consult for capacity was requested due to \"suicidal, depression; please also assess for decision making capacity\". Patient was seen by [facility] Consult & Liaison team.     Identifying information:Julisa Chaney is a pleasant 77 year old woman with past medical history that is most notable for trigeminal neuralgia, among others; who was admitted to [facility] on 7/19/2022 for severe sepsis suspected due to COVID infection and suspected bacterial pneumonia.     Presenting problem (including symptoms, strengths risks, resources used): Ms. Chaney reports that her head is causing her discomfort and that she is feeling \"foggy\" and not like herself.  She reports at times she does not like to talk " "to people right now and that she would like her family to help her with decisions at this time.      Psychosocial History: Ms. Chaney is living with her grandson for the past 1 1/2 years. She is  and has two adult children.  She is not feeling up to sharing more about herself at this time.  She requests that Writer ask her daughter for more history if needed.     Clinical History: Ms. Chaney reports \"I have had depression come and go\" for a few months. Notes she has been \"asking for something to help my mood but no one gives me anything\".  Denies a history of addiction or substance abuse.     Current medications:  Current Facility-Administered Medications   Medication     acetaminophen (TYLENOL) solution 650 mg    Or     acetaminophen (TYLENOL) Suppository 650 mg     acetaminophen (TYLENOL) tablet 650 mg     banatrol plus packet 1 packet     carboxymethylcellulose PF (REFRESH PLUS) 0.5 % ophthalmic solution 1 drop     glucose gel 15-30 g    Or     dextrose 50 % injection 25-50 mL    Or     glucagon injection 1 mg     guaiFENesin (ROBITUSSIN) 20 mg/mL solution 10 mL     insulin aspart (NovoLOG) injection (RAPID ACTING)     insulin glargine (LANTUS PEN) injection 10 Units     ipratropium - albuterol 0.5 mg/2.5 mg/3 mL (DUONEB) neb solution 3 mL     levETIRAcetam (KEPPRA) solution 1,000 mg     Medication instructions: Do NOT use nebulized medications     melatonin tablet 1-3 mg     meropenem (MERREM) 2 g in sodium chloride 0.9 % 100 mL intermittent infusion     metoprolol (LOPRESSOR) injection 5 mg     miconazole with skin protectant (MARLYS ANTIFUNGAL) 2 % cream     miconazole with skin protectant (MARLYS ANTIFUNGAL) 2 % cream     multivitamins w/minerals liquid 15 mL     naloxone (NARCAN) injection 0.2 mg    Or     naloxone (NARCAN) injection 0.4 mg    Or     naloxone (NARCAN) injection 0.2 mg    Or     naloxone (NARCAN) injection 0.4 mg     ondansetron (ZOFRAN ODT) ODT tab 4 mg    Or     ondansetron (ZOFRAN) " "injection 4 mg     OXcarbazepine (TRILEPTAL) suspension 450 mg     pantoprazole (PROTONIX) 2 mg/mL suspension 40 mg     prochlorperazine (COMPAZINE) injection 5 mg    Or     prochlorperazine (COMPAZINE) tablet 5 mg    Or     prochlorperazine (COMPAZINE) suppository 12.5 mg     QUEtiapine (SEROquel) tablet 25 mg     sodium chloride (PF) 0.9% PF flush 10-20 mL     sodium chloride (PF) 0.9% PF flush 10-40 mL     sodium chloride (PF) 0.9% PF flush 10-40 mL     topiramate (TOPAMAX) tablet 50 mg     vancomycin (FIRVANQ) oral solution 125 mg     vancomycin 1500 mg in 0.9% NaCl 250 ml intermittent infusion 1,500 mg       Mental Status Exam   Affect: Flat  Appearance: Disheveled   Attention Span/Concentration: Other: variable    Eye Contact: Variable  Fund of Knowledge: Delayed   Language /Speech Content: Fluent  Language /Speech Volume: Soft   Language /Speech Rate/Productions: Minimally Responsive   Recent Memory: Variable  Remote Memory: Variable  Mood: Sad   Orientation:   Person: Yes   Place: Yes  Time of Day: Yes   Date: No and Answer: \"who knows and who cares:   Situation (Do they understand why they are here?): No   Psychomotor Behavior: Underactive   Thought Content: Clear  Thought Form: Intact    Aid to Capacity  1. Able to understand medical problem? No   2. Able to understand proposed treatment? No    3. Able to understand alternative to proposed treatment (if any)? No    4. Able to understand option of refusing proposed treatment? No    5. Able to appreciate reasonably foreseeable consequences of accepting proposed treatment? No   6. Able to appreciate reasonably foreseeable consequences of refusing proposed treatment?  No   7. The person s decision is affected by depression? Unsure   8. The person s decision is affected by delusion/psychosis? No   Summary of capacity screener:   Ms. Chaney reports that she is feeling more foggy and forgetful at this time.  She displays apathy and reports 'I don't understand " "what is going on\".  States several times 'I want to go home. I want to go home\".  She is not able to tell me what lead to her hospitalization or what her future plans for disposition are.  She reports 'I don't know what medications I am taking or why\".     Collateral information: Reviewed chart and coordinated with Alissa (daughter) via phone.      Alissa reports that her mother is not like herself and that she is forgetful and disorganized which is not her baseline. She notes that she has trailed 2-3 depression medications. One worked so well that she was gaining weight and looked healthy but Ms. Chaney decided she no longer was going to take that medication due to the weight gain.  She then trailed another medication and that medication increased her depressive symptoms.  Daughter is unsure what the medications were called. Alissa also reports that her son whom was visiting Julisa noted she was having some delusional beliefs and that this has been limited since the discontinuation of narcotic pain medications.      Alissa is in support of assisting Ms. Chaney with decision making as they feel she is not in her right state of mind at this time.      Therapeutic intervention and progress:  Therapeutic intervention consisted of building therapeutic rapport, active listening and validation. Patient is making progress towards treatment goals as evidenced by conversation and assessment.       LORRI CARROLL MSW, [facility]  Triage and Transition - Consult and Liaison   [phone]            "

## 2022-08-30 NOTE — PROVIDER NOTIFICATION
"Paged Dr. Cristobal \"White lumen does not pull back blood. Will you please order cathflow? FYI- psych recs in.\"  "

## 2022-08-30 NOTE — PLAN OF CARE
Goal Outcome Evaluation:          Overall Patient Progress: no change         Reason for Admission: Cerebral Abscess     Cognitive/Mentation: A/Ox to self  Neuros/CMS: Intact ex difficult to assess d/t inconsistent command following and weakness, occasionally refuses to participate in assessment, lethargic, face symmetrical at rest, SHREYAS shoulder shrug and drift, BUE 3/5 strength, BLE 2/5 strength, SHREYAS Ataxia  VS: VSS.  GI: BS +, passing flatus, last BM 8/30, frequent loose stools, Incontinent.  : Pure wick in place, voiding adequate amounts. Incontinent.  Pulmonary: LS clear/ equal.  Pain: SHREYAS pain d/t pt confusion, appears comfortable, given scheduled tylenol.     Drains/Lines: PICC on LUE, SL, unit draw  Skin: Intact ex breakdown on coccyx and around rectum, care provided per orders   Activity: Assist x 2 with lift.  Diet: NPO with TF infusing Takes pills crushed through the tube .     Therapies recs: TCU  Discharge: pending    Aggression Stoplight Tool: green    End of shift summary: Pt stable throughout this shift, no changes noted in neuro status   1:1 bedside attendant d/t suicidal ideation

## 2022-08-30 NOTE — PLAN OF CARE
Goal Outcome Evaluation:    Plan of Care Reviewed With: patient     Overall Patient Progress: no change         No neuro changes. Still refuses parts of the neuro assessment. NPO, TF. Ax2 lift. Refuses to get up in chair despite education & encouragement. R PICC. Redness on buttocks--treated per POC.

## 2022-08-30 NOTE — CONSULTS
"Triage and Transition- Consult and Liaison     Julisaantonette Chaney  August 30, 2022      Psychiatry consult completed.     Dictation to follow.     Plan/Recommendations if agreed upon by hospitalist and care team:   -Ms. Chaney reports that she is feeling confused and fuzzy. She is varying on her orientation. She would benefit from next of kin to assist with decision making at this time. She is agreeable to this noting that she does not feel like herself at this time.   -She is requesting medication for depression and anxiety.  for psychiatric medication provider.   -She endorses thoughts of \"jumping from a building\" without intent, plan or means.  Do not believe that SP is necessary or sitter.   -Spoke to Alissa whom is in agreement with this plan.     LORRI CARROLL MSW, [facility]  Triage and Transition - Consult and Liaison   [phone]    "

## 2022-08-30 NOTE — PROGRESS NOTES
[facility]  Hospitalist Progress Note        Amparo Cristobal MD   08/30/2022    Subjective:   Coversational. Reports pain all over.  No sob. No headache.         Assessment and Plan:        Julisa Chaney is a 77-year-old woman with PMH of trigeminal neuralgia who was recently admitted to [facility] on 7/19 for severe sepsis due to COVID-19 infection and suspected bacterial pneumonia. She was treated with 5 days of remdesivir and did not require any steroids, also treated with IV vancomycin and IV meropenem.     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  She was switched to IV vancomycin, cefepime and metronidazole and transferred to [facility] for neurosurgery assessment. MRI brain w/wo contrast 7/30 showed ventriculitis with probable abscess. Neurosurgery/neurology/infectious disease/oncology consulted. Patient taken to the OR on 7/31 for left frontal ventriculostomy.   Since then has remained on broad spectrum antibiotics.  In addition she has developed C. Difficile colitis and is on treatment.  She has continued to have a significant encephalopathy.     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  - flow cytometry was done which did not show any evidence of malignancy.  - s/p Left frontal ventriculostomy on 7/31/2022, EVD removed 8/8; CSF cultures have remained negative; CSF counts decreasing 469---233 ---56 (last CSF from 8/18)  - Head CT from 8/19 with stable presumed vasogenic edema in the white matter of the anterior inferior left frontal lobe related to ventriculitis of the  "left lateral ventricle; stable ventriculomegaly of the left lateral ventricle  ID following  Remains afebrile; wbc 21--->leukocytosis resolved ; was initially on vancomycin, cefepime, and metronidazole-- then Meropenem and Vancomycin; ID following, plan for 6 weeks treatment until 9/8/22  Has been having mild persistent headaches; on 8/27 reevaluated by neurosurgery and recommended head CT, done on 8/29-->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 The left temporal horn remains slightly dilated but no samuel hydrocephalus. No acute changes.  - continue scheduled Tylenol 650 mg TID  - trying to avoid any narcotics with her confusion/ encephalopathy            Acute infectious encephalopathy due to above:  - mentation much improved since admission ; fluctuating at times ; will not engage in conversation most of the times  - Continue to treat underlying issues as noted above.    - Maintain normal sleep/wake cycle as able.  - 8/23 discontinued prn benadryl, narcotics; minimize narcotic; if needed for headache, will plan to order as one time dose rather than prn available    Suicidal ideations/ agitation  With improvement in her mentation, she has started becoming at times restless and agitated  on 8/27, reported being suicidal, when asked about plan, she states \"I am not going to tell you\"  - Psych to see. --pending; suicide precautions; sitter in room     C. difficile colitis:  Patient was noted to have copious diarrhea on 8/1 and was noted positive for C. Difficile.  -Continue oral vancomycin.  Will need a prolonged treatment given other abx use.  - Rectal tube came off 8/28; monitor clinically  - ID following as noted above.     Acute on chronic anemia:  Prior labs show baseline hemoglobin of about 9-10.   Hb on admission was 11.7, slowly trended down, got 1 unit PRBC on 8/8/22 for Hb 6.9  Received IV iron infusion x3 with first on 8/11/22.  Iron panel on 7/30/22 showed that her total iron was low at " 14, binding capacity was low at 28, iron saturation was 6 and B12 was high and folate was normal  No obvious ongoing bleeding noted; Hb has now remained stable around 9 to 10  -Continue to monitor hgb      History of trigeminal neuralgia  - Continue PTA Trileptal , Keppra and Topamax.     History of chronic pyloric ulcer  She had EGD done in 2020 which showed gastroduodenitis with a duodenal stricture.  - Continue PPI.     Hyperglycemia  Recent HbA1c was 5.6 on 7/19/22. Was not on any medication prior to admission.  - Continue medium dose sliding scale insulin every 4 hours while on tube feeds  - started on Lantus 10 units bedtime, continue .      Intermittent Hypokalemia, Hypo/hypernatremia, Hypophosphatemia:  - Replace per protocol.  - Continue to follow trend      Hypernatremia: resolved.  - Continue free water flushes 200 ml  every 4 hrs     Severe protein calorie malnutrition:  - Nutrition following for tube feeds via G tube (placed 8/16) ; getting free water flushes  - Will need SLP consult when more able to participate     Physical deconditioning:  - will need TCU placement; declined by LTACH; continue therapy as able to participate  - SW following for disposition    Diet: NPO for Medical/Clinical Reasons Except for: Other; Specify: NPO For oral intake and gastric feedings for 6 hours post insertion Gastrostomy G/GJ tube. May feed through Jejunal or Distal PORT immediately for GJ tubes ONLY.  Adult Formula Drip Feeding: Continuous Vital 1.5; Gastrostomy; Goal Rate: 50; mL/hr; Medication - Feeding Tube Flush Frequency: At least 15-30 mL water before and after medication administration and with tube clogging; Amount to Send (Nutrition us...      DVT Prophylaxis: Pneumatic Compression Devices    Central Lines: PRESENT  PICC Triple Lumen 07/22/22 Right Basilic Vascular access-Site Assessment: WDL    Code Status: Full Code          Disposition Plan    Expected Discharge Date: unclear at this time; pending clinical  improvement  - planned for TCU; declined by LTACH; SW following for disposition    Awil ALANA Cristobal MD              Physical Exam:      Blood pressure 114/65, pulse 86, temperature 97.6  F (36.4  C), temperature source Axillary, resp. rate 18, weight 48.3 kg (106 lb 7.7 oz), SpO2 97 %, not currently breastfeeding.  Vitals:    08/22/22 0933 08/24/22 0629 08/25/22 0610   Weight: 56 kg (123 lb 7.3 oz) 48.3 kg (106 lb 7.7 oz) 48.3 kg (106 lb 7.7 oz)     Vital Signs with Ranges  Temp:  [96.8  F (36  C)-97.6  F (36.4  C)] 97.6  F (36.4  C)  Pulse:  [78-86] 86  Resp:  [16-18] 18  BP: (111-116)/(57-65) 114/65  SpO2:  [97 %-99 %] 97 %  I/O's Last 24 hours  I/O last 3 completed shifts:  In: 2000 [NG/GT:1400]  Out: -     Constitutional: alert, awake and conversant; oriented to self and place; follows commands; moving all extremities equally; in no apparent distress; does not engage in conversation   HEENT: Pupils equal and reactive to light and accomodation, neck supple    Oral cavity: Dry oral mucosa   Cardiovascular: Normal s1 s2, regular rate and rhythm, no murmur   Lungs: B/l clear to auscultation, no wheezes or crepitations   Abdomen: Soft, nt, nd, no guarding, rigidity or rebound; BS +   LE : No edema   Musculoskeletal: moving all extremities equally   Neuro: No focal neurological deficits noted   Psych: flat affect, agitated at times                Medications:          acetaminophen  650 mg Oral TID     banatrol plus  1 packet Per Feeding Tube TID w/meals     insulin aspart  1-6 Units Subcutaneous Q4H     insulin glargine  10 Units Subcutaneous At Bedtime     levETIRAcetam  1,000 mg Oral or Feeding Tube Q12H     meropenem  2 g Intravenous Q8H     miconazole with skin protectant   Topical BID     multivitamins w/minerals  15 mL Per Feeding Tube Daily     OXcarbazepine  450 mg Oral or Feeding Tube At Bedtime     pantoprazole  40 mg Oral or Feeding Tube QAM AC     QUEtiapine  25 mg Oral At Bedtime     sodium chloride (PF)   10-40 mL Intracatheter Q7 Days     topiramate  50 mg Oral or Feeding Tube At Bedtime     vancomycin  125 mg Oral or Feeding Tube 4x Daily     vancomycin  1,500 mg Intravenous Q24H     PRN Meds: acetaminophen **OR** acetaminophen, artificial tears ophthalmic solution, glucose **OR** dextrose **OR** glucagon, guaiFENesin, ipratropium - albuterol 0.5 mg/2.5 mg/3 mL, - MEDICATION INSTRUCTIONS -, melatonin, metoprolol, miconazole with skin protectant, naloxone **OR** naloxone **OR** naloxone **OR** naloxone, ondansetron **OR** ondansetron, prochlorperazine **OR** prochlorperazine **OR** prochlorperazine, sodium chloride (PF), sodium chloride (PF)         Data:      All new lab and imaging data was reviewed.   Recent Labs   Lab Test 08/26/22  0557 08/23/22  1920 08/21/22  0514 07/30/22  0559 07/29/22  1414 07/28/22  0408 07/27/22  2231 07/20/22  0923 07/19/22  2255   WBC 6.4 7.4 6.6   < >  --    < > 6.8   < >  --    HGB 10.5* 10.2* 9.4*   < >  --    < > 9.6*   < >  --    MCV 84 84 86   < >  --    < > 73*   < >  --    * 553* 638*   < >  --    < > 547*   < >  --    INR  --   --   --   --  1.34*  --  1.13  --  1.36*    < > = values in this interval not displayed.      Recent Labs   Lab Test 08/30/22  0403 08/30/22  0016 08/29/22  2104 08/29/22  0813 08/29/22  0610 08/28/22  0723 08/28/22  0545 08/27/22  0816 08/27/22  0619 08/26/22  1237 08/26/22  0557 08/24/22  0809 08/24/22  0555   NA  --   --   --   --  138  --   --   --   --   --  138  --  138   POTASSIUM  --   --   --   --  4.0  --  3.9  --  3.8  --  3.8   < > 3.8   CHLORIDE  --   --   --   --  107  --   --   --   --   --  107  --  108   CO2  --   --   --   --  27  --   --   --   --   --  29  --  26   BUN  --   --   --   --  14  --   --   --   --   --  12  --  12   CR  --   --   --   --  0.41*  --  0.39*  --   --   --  0.40*  --  0.33*   ANIONGAP  --   --   --   --  4  --   --   --   --   --  2*  --  4   ADY  --   --   --   --  8.9  --   --   --   --   --  8.6   --  8.7   * 135* 99   < > 104*   < >  --    < >  --    < > 95   < > 124*    < > = values in this interval not displayed.     No lab results found.    Invalid input(s): TROP, TROPONINIES

## 2022-08-30 NOTE — PROVIDER NOTIFICATION
Reached out to psych regarding consult that was placed on Sunday & patient still has not been evaluated. Awaiting call back on who will evaluate & when pt will be seen.

## 2022-08-31 LAB
ANION GAP SERPL CALCULATED.3IONS-SCNC: 4 MMOL/L (ref 3–14)
BUN SERPL-MCNC: 18 MG/DL (ref 7–30)
CALCIUM SERPL-MCNC: 9.2 MG/DL (ref 8.5–10.1)
CHLORIDE BLD-SCNC: 110 MMOL/L (ref 94–109)
CO2 SERPL-SCNC: 28 MMOL/L (ref 20–32)
CREAT SERPL-MCNC: 0.43 MG/DL (ref 0.52–1.04)
GFR SERPL CREATININE-BSD FRML MDRD: >90 ML/MIN/1.73M2
GLUCOSE BLD-MCNC: 115 MG/DL (ref 70–99)
GLUCOSE BLDC GLUCOMTR-MCNC: 105 MG/DL (ref 70–99)
GLUCOSE BLDC GLUCOMTR-MCNC: 123 MG/DL (ref 70–99)
GLUCOSE BLDC GLUCOMTR-MCNC: 124 MG/DL (ref 70–99)
GLUCOSE BLDC GLUCOMTR-MCNC: 127 MG/DL (ref 70–99)
GLUCOSE BLDC GLUCOMTR-MCNC: 170 MG/DL (ref 70–99)
GLUCOSE BLDC GLUCOMTR-MCNC: 95 MG/DL (ref 70–99)
LACTATE SERPL-SCNC: 1.2 MMOL/L (ref 0.7–2)
POTASSIUM BLD-SCNC: 3.8 MMOL/L (ref 3.4–5.3)
SODIUM SERPL-SCNC: 142 MMOL/L (ref 133–144)
VANCOMYCIN SERPL-MCNC: 16.3 MG/L

## 2022-08-31 PROCEDURE — 250N000011 HC RX IP 250 OP 636: Performed by: INTERNAL MEDICINE

## 2022-08-31 PROCEDURE — 250N000013 HC RX MED GY IP 250 OP 250 PS 637: Performed by: HOSPITALIST

## 2022-08-31 PROCEDURE — 250N000013 HC RX MED GY IP 250 OP 250 PS 637

## 2022-08-31 PROCEDURE — 83605 ASSAY OF LACTIC ACID: CPT | Performed by: INTERNAL MEDICINE

## 2022-08-31 PROCEDURE — 258N000003 HC RX IP 258 OP 636: Performed by: INTERNAL MEDICINE

## 2022-08-31 PROCEDURE — 250N000011 HC RX IP 250 OP 636: Performed by: HOSPITALIST

## 2022-08-31 PROCEDURE — 80202 ASSAY OF VANCOMYCIN: CPT | Performed by: INTERNAL MEDICINE

## 2022-08-31 PROCEDURE — 80048 BASIC METABOLIC PNL TOTAL CA: CPT | Performed by: HOSPITALIST

## 2022-08-31 PROCEDURE — 258N000003 HC RX IP 258 OP 636: Performed by: HOSPITALIST

## 2022-08-31 PROCEDURE — 120N000001 HC R&B MED SURG/OB

## 2022-08-31 PROCEDURE — 99233 SBSQ HOSP IP/OBS HIGH 50: CPT | Performed by: HOSPITALIST

## 2022-08-31 PROCEDURE — 999N000190 HC STATISTIC VAT ROUNDS

## 2022-08-31 PROCEDURE — 250N000013 HC RX MED GY IP 250 OP 250 PS 637: Performed by: INTERNAL MEDICINE

## 2022-08-31 RX ORDER — QUETIAPINE FUMARATE 50 MG/1
50 TABLET, FILM COATED ORAL AT BEDTIME
Status: DISCONTINUED | OUTPATIENT
Start: 2022-08-31 | End: 2022-10-04 | Stop reason: HOSPADM

## 2022-08-31 RX ADMIN — MEROPENEM 2 G: 1 INJECTION, POWDER, FOR SOLUTION INTRAVENOUS at 04:08

## 2022-08-31 RX ADMIN — VANCOMYCIN HYDROCHLORIDE 125 MG: KIT at 13:34

## 2022-08-31 RX ADMIN — MICONAZOLE NITRATE: 20 CREAM TOPICAL at 20:25

## 2022-08-31 RX ADMIN — Medication 1 PACKET: at 10:33

## 2022-08-31 RX ADMIN — Medication 1 PACKET: at 13:34

## 2022-08-31 RX ADMIN — MEROPENEM 2 G: 1 INJECTION, POWDER, FOR SOLUTION INTRAVENOUS at 13:31

## 2022-08-31 RX ADMIN — MEROPENEM 2 G: 1 INJECTION, POWDER, FOR SOLUTION INTRAVENOUS at 20:24

## 2022-08-31 RX ADMIN — LEVETIRACETAM 1000 MG: 100 SOLUTION ORAL at 20:25

## 2022-08-31 RX ADMIN — MICONAZOLE NITRATE: 20 CREAM TOPICAL at 10:26

## 2022-08-31 RX ADMIN — OXCARBAZEPINE 450 MG: 300 SUSPENSION ORAL at 21:53

## 2022-08-31 RX ADMIN — INSULIN ASPART 1 UNITS: 100 INJECTION, SOLUTION INTRAVENOUS; SUBCUTANEOUS at 00:54

## 2022-08-31 RX ADMIN — Medication 40 MG: at 10:28

## 2022-08-31 RX ADMIN — Medication 15 ML: at 10:27

## 2022-08-31 RX ADMIN — TOPIRAMATE 50 MG: 50 TABLET ORAL at 21:53

## 2022-08-31 RX ADMIN — LEVETIRACETAM 1000 MG: 100 SOLUTION ORAL at 10:28

## 2022-08-31 RX ADMIN — Medication 1 PACKET: at 17:11

## 2022-08-31 RX ADMIN — ACETAMINOPHEN 650 MG: 325 SUSPENSION ORAL at 04:08

## 2022-08-31 RX ADMIN — ACETAMINOPHEN 650 MG: 325 SUSPENSION ORAL at 21:53

## 2022-08-31 RX ADMIN — VANCOMYCIN HYDROCHLORIDE 125 MG: KIT at 17:11

## 2022-08-31 RX ADMIN — QUETIAPINE FUMARATE 50 MG: 50 TABLET ORAL at 21:53

## 2022-08-31 RX ADMIN — VANCOMYCIN HYDROCHLORIDE 1500 MG: 10 INJECTION, POWDER, LYOPHILIZED, FOR SOLUTION INTRAVENOUS at 14:16

## 2022-08-31 RX ADMIN — VANCOMYCIN HYDROCHLORIDE 125 MG: KIT at 10:29

## 2022-08-31 RX ADMIN — VANCOMYCIN HYDROCHLORIDE 125 MG: KIT at 21:53

## 2022-08-31 ASSESSMENT — ACTIVITIES OF DAILY LIVING (ADL)
ADLS_ACUITY_SCORE: 45
ADLS_ACUITY_SCORE: 44
ADLS_ACUITY_SCORE: 47
ADLS_ACUITY_SCORE: 44
ADLS_ACUITY_SCORE: 47
ADLS_ACUITY_SCORE: 44
ADLS_ACUITY_SCORE: 43
ADLS_ACUITY_SCORE: 47
ADLS_ACUITY_SCORE: 44

## 2022-08-31 NOTE — PLAN OF CARE
A&Ox2 disoriented to time and situation, forgetful. VSS on RA. Tele: SR. C/o headache cold cloth applied. Neuros: difficult to assess d/t inconsistently following commands, no changes noted. Incontinent of bladder and bowel.  PICC on RUE, SL. Assist x 2 with lift. Reposition q2. NPO with TF infusing Takes pills crushed through the tube. 1:1 bedside attendant d/t suicidal ideation. Seizure precautions and enteric precautions maintained.

## 2022-08-31 NOTE — PLAN OF CARE
Goal Outcome Evaluation:    Plan of Care Reviewed With: patient     Overall Patient Progress: no change         No neuro changes. Still refuses most of the neuro assessment. NPO, TF. Ax2 lift. Sat up in chair for 30-45 minutes. R PICC. Incontinent.

## 2022-08-31 NOTE — PROGRESS NOTES
"[facility]  Hospitalist Progress Note        Amparo Cristobal MD   08/31/2022    Subjective:   More awake and interactive today. Oriented to person and place. \"headaches are the on/off\". Denies other pain this am.         Assessment and Plan:        Julisa Chaney is a 77-year-old woman with PMH of trigeminal neuralgia who was recently admitted to [facility] on 7/19 for severe sepsis due to COVID-19 infection and suspected bacterial pneumonia. She was treated with 5 days of remdesivir and did not require any steroids, also treated with IV vancomycin and IV meropenem.     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  She was switched to IV vancomycin, cefepime and metronidazole and transferred to [facility] for neurosurgery assessment. MRI brain w/wo contrast 7/30 showed ventriculitis with probable abscess. Neurosurgery/neurology/infectious disease/oncology consulted. Patient taken to the OR on 7/31 for left frontal ventriculostomy.   Since then has remained on broad spectrum antibiotics.  In addition she has developed C. Difficile colitis and is on treatment.  She has continued to have a significant encephalopathy.     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  - flow cytometry was done which did not show any evidence of malignancy.  - s/p Left frontal ventriculostomy on 7/31/2022, EVD removed 8/8; CSF cultures have remained negative; CSF counts decreasing 469---233 ---56 (last CSF from 8/18)  - Head CT from 8/19 with stable presumed vasogenic edema in the white matter of the anterior " "inferior left frontal lobe related to ventriculitis of the left lateral ventricle; stable ventriculomegaly of the left lateral ventricle  ID following  Remains afebrile; wbc 21--->leukocytosis resolved ; was initially on vancomycin, cefepime, and metronidazole-- then Meropenem and Vancomycin; ID following, plan for 6 weeks treatment until 9/8/22  Has been having mild persistent headaches; on 8/27 reevaluated by neurosurgery and recommended head CT, done on 8/29-->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 The left temporal horn remains slightly dilated but no samuel hydrocephalus. No acute changes.  - continue scheduled Tylenol 650 mg TID  - trying to avoid any narcotics with her confusion/ encephalopathy            Acute infectious encephalopathy due to above:  mentation much improved since admission ; fluctuating at times ; will not engage in conversation most of the times  - Continue to treat underlying issues as noted above.    - Maintain normal sleep/wake cycle as able.  - 8/23 discontinued prn benadryl, narcotics; minimize narcotic; if needed for headache, will plan to order as one time dose rather than prn available    Suicidal ideations/ agitation  With improvement in her mentation, she has started becoming at times restless and agitated  on 8/27, reported being suicidal, when asked about plan, she states \"I am not going to tell you\"  - suicide precautions; sitter in room  - Follow psych recs.     C. difficile colitis:  Patient was noted to have copious diarrhea on 8/1 and was noted positive for C. Difficile.  -Continue oral vancomycin.  Will need a prolonged treatment given other abx use.  - Rectal tube came off 8/28; monitor clinically  - ID following as noted above.     Acute on chronic anemia:  Prior labs show baseline hemoglobin of about 9-10.   Hb on admission was 11.7, slowly trended down, got 1 unit PRBC on 8/8/22 for Hb 6.9  Received IV iron infusion x3 with first on 8/11/22.  Iron " panel on 7/30/22 showed that her total iron was low at 14, binding capacity was low at 28, iron saturation was 6 and B12 was high and folate was normal  No obvious ongoing bleeding noted; Hb has now remained stable around 9 to 10  -Continue to monitor hgb      History of trigeminal neuralgia  - Continue PTA Trileptal , Keppra and Topamax.     History of chronic pyloric ulcer  She had EGD done in 2020 which showed gastroduodenitis with a duodenal stricture.  - Continue PPI.     Hyperglycemia  Recent HbA1c was 5.6 on 7/19/22. Was not on any medication prior to admission.  - Continue medium dose sliding scale insulin every 4 hours while on tube feeds  - started on Lantus 10 units bedtime, continue .      Intermittent Hypokalemia, Hypo/hypernatremia, Hypophosphatemia:  - Replace per protocol.  - Continue to follow trend      Hypernatremia: resolved.  - Continue free water flushes 200 ml  every 4 hrs     Severe protein calorie malnutrition:  - Nutrition following for tube feeds via G tube (placed 8/16) ; getting free water flushes  - Will need SLP consult when more able to participate     Physical deconditioning:  - will need TCU placement; declined by LTACH; continue therapy as able to participate  - SW following for disposition    Diet: NPO for Medical/Clinical Reasons Except for: Other; Specify: NPO For oral intake and gastric feedings for 6 hours post insertion Gastrostomy G/GJ tube. May feed through Jejunal or Distal PORT immediately for GJ tubes ONLY.  Adult Formula Drip Feeding: Continuous Vital 1.5; Gastrostomy; Goal Rate: 50; mL/hr; Medication - Feeding Tube Flush Frequency: At least 15-30 mL water before and after medication administration and with tube clogging; Amount to Send (Nutrition us...      DVT Prophylaxis: Pneumatic Compression Devices    Central Lines: PRESENT  PICC Triple Lumen 07/22/22 Right Basilic Vascular access-Site Assessment: WDL    Code Status: Full Code     Per psych eval on 8/30 patient  does not display decision making capacity and recommended surrogate decision maker. Patient would like her DTR be that person.    SW to help us with that.        Disposition Plan    Expected Discharge Date: unclear at this time; pending clinical improvement  - planned for TCU; declined by LTACH; [facility] following for disposition    Amparo Cristobal MD              Physical Exam:      Blood pressure 111/58, pulse 80, temperature 97.4  F (36.3  C), temperature source Axillary, resp. rate 16, weight 48 kg (105 lb 13.1 oz), SpO2 95 %, not currently breastfeeding.  Vitals:    08/24/22 0629 08/25/22 0610 08/31/22 0500   Weight: 48.3 kg (106 lb 7.7 oz) 48.3 kg (106 lb 7.7 oz) 48 kg (105 lb 13.1 oz)     Vital Signs with Ranges  Temp:  [96.5  F (35.8  C)-98.5  F (36.9  C)] 97.4  F (36.3  C)  Pulse:  [80-91] 80  Resp:  [16-20] 16  BP: (101-116)/(46-65) 111/58  SpO2:  [95 %-98 %] 95 %  I/O's Last 24 hours  I/O last 3 completed shifts:  In: 860 [NG/GT:860]  Out: -     Constitutional: alert, awake and conversant; oriented to self and place; follows commands; moving all extremities equally; in no apparent distress; does not engage in conversation   HEENT: Pupils equal and reactive to light and accomodation, neck supple    Oral cavity: Dry oral mucosa   Cardiovascular: Normal s1 s2, regular rate and rhythm, no murmur   Lungs: B/l clear to auscultation, no wheezes or crepitations   Abdomen: Soft, nt, nd, no guarding, rigidity or rebound; BS +   LE : No edema   Musculoskeletal: moving all extremities equally   Neuro: No focal neurological deficits noted   Psych: flat affect, agitated at times                Medications:          banatrol plus  1 packet Per Feeding Tube TID w/meals     insulin aspart  1-6 Units Subcutaneous Q4H     insulin glargine  10 Units Subcutaneous At Bedtime     levETIRAcetam  1,000 mg Oral or Feeding Tube Q12H     meropenem  2 g Intravenous Q8H     miconazole with skin protectant   Topical BID     multivitamins  w/minerals  15 mL Per Feeding Tube Daily     OXcarbazepine  450 mg Oral or Feeding Tube At Bedtime     pantoprazole  40 mg Oral or Feeding Tube QAM AC     QUEtiapine  25 mg Oral At Bedtime     sodium chloride (PF)  10-40 mL Intracatheter Q7 Days     sterile water (preservative free)         topiramate  50 mg Oral or Feeding Tube At Bedtime     vancomycin  125 mg Oral or Feeding Tube 4x Daily     vancomycin  1,500 mg Intravenous Q24H     PRN Meds: acetaminophen **OR** acetaminophen, artificial tears, glucose **OR** dextrose **OR** glucagon, guaiFENesin, ipratropium - albuterol 0.5 mg/2.5 mg/3 mL, - MEDICATION INSTRUCTIONS -, melatonin, metoprolol, miconazole with skin protectant, naloxone **OR** naloxone **OR** naloxone **OR** naloxone, ondansetron **OR** ondansetron, prochlorperazine **OR** prochlorperazine **OR** prochlorperazine, sodium chloride (PF), sodium chloride (PF)         Data:      All new lab and imaging data was reviewed.   Recent Labs   Lab Test 08/26/22  0557 08/23/22  1920 08/21/22  0514 07/30/22  0559 07/29/22  1414 07/28/22  0408 07/27/22  2231 07/20/22  0923 07/19/22  2255   WBC 6.4 7.4 6.6   < >  --    < > 6.8   < >  --    HGB 10.5* 10.2* 9.4*   < >  --    < > 9.6*   < >  --    MCV 84 84 86   < >  --    < > 73*   < >  --    * 553* 638*   < >  --    < > 547*   < >  --    INR  --   --   --   --  1.34*  --  1.13  --  1.36*    < > = values in this interval not displayed.      Recent Labs   Lab Test 08/31/22  0630 08/31/22  0410 08/31/22  0045 08/29/22  0813 08/29/22  0610 08/28/22  0723 08/28/22  0545 08/26/22  1237 08/26/22  0557     --   --   --  138  --   --   --  138   POTASSIUM 3.8  --   --   --  4.0  --  3.9   < > 3.8   CHLORIDE 110*  --   --   --  107  --   --   --  107   CO2 28  --   --   --  27  --   --   --  29   BUN 18  --   --   --  14  --   --   --  12   CR 0.43*  --   --   --  0.41*  --  0.39*  --  0.40*   ANIONGAP 4  --   --   --  4  --   --   --  2*   ADY 9.2  --   --    --  8.9  --   --   --  8.6   * 105* 170*   < > 104*   < >  --    < > 95    < > = values in this interval not displayed.     No lab results found.    Invalid input(s): TROP, TROPONINIES

## 2022-08-31 NOTE — PLAN OF CARE
Shift Note [id number]:   Patient admitted to unit for brain abscess. Patient is alert and oriented x2-disoriented to time and person. Pt able to say birthdate and first name but unable to verbalize last name. Assist x2, lift. VSS. C/o head pain, FLACC score 0/10. Aggression Stop Light green. Tele: SR.     Continues on TF at goal rate 50 ml hr. IV and Oral ABX therapy continued. Spiritual health consult ordered for health care directive per MD and patient. Multiple loose stools on shift. Patient refused most of neuro assessment, does not want to participate.     Major Shift Events: spiritual health consult

## 2022-08-31 NOTE — PLAN OF CARE
[id number]  Pt here with Brain abscess. A&O x2, disoriented to time and situation. Neuro's not fully assessed, Pt did not perform part of assessment, otherwise, slow and whispers speech. VSS. R PICC patent and intact. Tele NSR. NPO, PEG in place, TF running at 50ml/hr with Q4 200 ml free water flush. Ex cath in place. Up with A2/lift. PRN Tylenol for pain. Suicide watch, sitter at bedside. Pt scoring yellow on the Aggression Stop Light Tool. Discharge pending. No neuro change this shift.

## 2022-08-31 NOTE — CONSULTS
"      Initial Psychiatric Consult   Consult date: August 31, 2022         Reason for Consult, requesting source:    Suicidal, depression  Requesting source: Timmy Grover Md    This note is being entered to supplement the psychiatry consultation note that was completed on August 30, 2022 by the licensed mental health professional Vandana Diaz. They have reviewed with me the pertinent clinical details related to their encounter. I am being consulted to offer additional guidance on psychiatric pharmacological interventions.         HPI:   Julisa Chaney is a pleasant 77 year old woman with past medical history that is most notable for trigeminal neuralgia, among others; who was admitted to [facility] on 7/19/2022 for severe sepsis suspected due to COVID infection and suspected bacterial pneumonia. Her course has been complicated by prolonged severe acute metabolic encephalopathy and she is now found to have suspected intracerebral abscess causing acute ventriculitis, she was transferred to [facility] on 7/27 and underwent left frontal ventriculostomy on 7/31. She began expressing suicidal ideation on 8/27. Delirium is ongoing. Psychiatry has been asked to evaluate; seen by HP Vandana Diaz on 8/30. I am asked to comment on medications for depression/anxiety at patient request.    I met with Julisa in her room where she is seen laying in bed. She states that she is upset because \"I don't know what's going on\". She is unable to verbalize why she is in the hospital, she points to her head but is not able to find the words to describe it. Also unaware what treatment she is currently receiving. She states \"no one is giving me attention. They ignore me\". I explained to her why she is in the hospital and that she has a sitter in her room at all times, she acknowledged this but within a short time was unable to recall this information. She states her mood is \"bad\" because she doesn't know what's going " "on, and reports high level of anxiety \"I go into a frenzy before they even ask a question\". She has difficulty articulating her thoughts in more detail. States she cries all day long, upset that she can't get out of bed. She is oriented to self and \"hospital\". Reports poor sleep but unable to describe quality of sleep, \"I ask them but they won't tell me.\" She does endorse passive suicidal ideation without current plan or intent. I asked if she has thought about specific ways she could hurt herself, she states \"oh yes\", but is unable to describe what she has thought of; she goes off on a tangent about the family farm \"I like to go there to have fun, not to kill myself\". She describes her family as reason for living, describes having 24 or 22 grandkids or great grandkids (provides different responses during conversation). Unable to state name of hospital or name of city, does not even make a guess. States we are \"[facility]\". Aware we are in [place]. States the date is February 30, year is \"19... 20... 80... I don't know\". Able to spell WORLD forward accurately, unable to spell backwards \"S-D-O-R\".         Physical ROS:   The 10 point Review of Systems is negative other than noted in the HPI or here.           Medications:       banatrol plus  1 packet Per Feeding Tube TID w/meals     insulin aspart  1-6 Units Subcutaneous Q4H     insulin glargine  10 Units Subcutaneous At Bedtime     levETIRAcetam  1,000 mg Oral or Feeding Tube Q12H     meropenem  2 g Intravenous Q8H     miconazole with skin protectant   Topical BID     multivitamins w/minerals  15 mL Per Feeding Tube Daily     OXcarbazepine  450 mg Oral or Feeding Tube At Bedtime     pantoprazole  40 mg Oral or Feeding Tube QAM AC     QUEtiapine  25 mg Oral At Bedtime     sodium chloride (PF)  10-40 mL Intracatheter Q7 Days     topiramate  50 mg Oral or Feeding Tube At Bedtime     vancomycin  125 mg Oral or Feeding Tube 4x Daily     vancomycin  1,500 mg Intravenous " "Q24H            Physical and Psychiatric Examination:     /57 (BP Location: Left arm)   Pulse 79   Temp 97  F (36.1  C) (Axillary)   Resp 18   Wt 48 kg (105 lb 13.1 oz)   SpO2 96%   BMI 17.61 kg/m    Weight is 105 lbs 13.13 oz  Body mass index is 17.61 kg/m .    Physical Exam:  I have reviewed the physical exam as documented by by the medical team and agree with findings and assessment and have no additional findings to add at this time.    Mental Status Exam:  Appearance: awake, alert and disheveled   Attitude:  cooperative  Eye Contact:  fair  Mood:  \"bad\"  Affect:  mood congruent and blunted  Speech:  paucity of speech and delayed responses  Psychomotor Behavior:  no evidence of tardive dyskinesia, dystonia, or tics  Throught Process:  tangental  Associations:  no loose associations  Thought Content:  no evidence of psychotic thought and passive suicidal ideation present  Insight:  partial  Judgement:  limited  Oriented to:  Self, \"hospital\"  Attention Span and Concentration:  limited  Recent and Remote Memory:  limited               DSM-5 Diagnosis:   Delirium  History of MDD          Assessment:   Due to ongoing delirium, I do not recommend starting an antidepressant for depressive symptoms at this time. Delirium itself can present with depressive symptoms, and psychotropic medications have the potential to cause or exacerbate delirium. At this time, it is unclear if depressive symptoms are due to delirium or depressive disorder. Would recommend holding off on starting antidepressant until delirium is resolved. She reports that her depressive symptoms are related to not knowing what is going on/why she is in the hospital, which is more reflective of delirium. She does complain of poor sleep, which may partly be due to frequent awakenings by nursing staff for overnight assessments. Could try increasing quetiapine to 50mg nightly which may help with sleep and possible mood benefits as " "well.    Discussed with daughter Alissa; she is in agreement with above plan.           Summary of Recommendations:   1. Increased quetiapine to 50mg nightly    2. Do not recommend starting antidepressant medication at this time    3. Delirium precautions:    Up during the day with lights on, blinds open    Lights off at night, avoid interruptions during the night as much as possible    Family visits, mementos from home (family photos, favorite blanket, ect) can be helpful    Encourage use of sensory aids (hearing aid, eyeglasses)    Frequent reorientation    Avoid opioids, benzodiazepines, anticholinergics if possible      Continue to ensure proper nutrition, fluid and electrolyte balance. Monitor for infections, hypoxia, metabolic derangements, or other causes of delirium.     4. Please reconsult psychiatry as needed      \"This dictation was performed with voice recognition software and may contain errors,  omissions and inadvertent word substitution.\"           "

## 2022-08-31 NOTE — PROGRESS NOTES
Current condition (current mood & behavior): Calm  Sitter present: Yes  Every 15 minute documentation by NA/RN completed for Shift: Yes  Room safety Suicide Checklist completed in Epic: YES  Order for psych consult placed (if appropriate):Yes  Suicide care plan added: Yes      Cassie Lynch RN

## 2022-08-31 NOTE — PHARMACY-VANCOMYCIN DOSING SERVICE
Pharmacy Vancomycin Note  Date of Service 2022  Patient's  1945   77 year old, female    Indication: Meningitis  Day of Therapy: 42  Current vancomycin regimen:  1500 mg IV q24h  Current vancomycin monitoring method: Trough (Method 1 = dosing nomogram)  Current vancomycin therapeutic monitoring goal: 15-20 mg/L      Current estimated CrCl = Estimated Creatinine Clearance: 83 mL/min (A) (based on SCr of 0.43 mg/dL (L)).    Creatinine for last 3 days  2022:  6:10 AM Creatinine 0.41 mg/dL  2022:  6:30 AM Creatinine 0.43 mg/dL    Recent Vancomycin Levels (past 3 days)  2022:  1:30 PM Vancomycin 16.3 mg/L    Vancomycin IV Administrations (past 72 hours)                   vancomycin 1500 mg in 0.9% NaCl 250 ml intermittent infusion 1,500 mg (mg) 1,500 mg New Bag 22 1416     1,500 mg New Bag 22 1308     1,500 mg New Bag 22 1308                Nephrotoxins and other renal medications (From now, onward)    Start     Dose/Rate Route Frequency Ordered Stop    22 1250  vancomycin 1500 mg in 0.9% NaCl 250 ml intermittent infusion 1,500 mg         1,500 mg  over 90 Minutes Intravenous EVERY 24 HOURS 22 1246      08/15/22 0000  vancomycin        Note to Pharmacy: Check twice weekly creatinine and vancomycin levels. Dosing per Pharm D based on AUC       08/15/22 1505 22 2359    22 0900  vancomycin (FIRVANQ) oral solution 125 mg         125 mg Oral or Feeding Tube 4 TIMES DAILY 22 0819               Contrast Orders - past 72 hours (72h ago, onward)    None          Interpretation of levels and current regimen:  Vancomycin level is reflective of therapeutic level    Has serum creatinine changed greater than 50% in last 72 hours: No    Urine output:  unable to determine    Renal Function: Stable  Plan:  1. Continue Current Dose  2. Vancomycin monitoring method: Trough (Method 1 = dosing nomogram)  3. Vancomycin therapeutic monitoring goal: 15-20  mg/L  4. Pharmacy will check vancomycin levels as appropriate in 3-5 Days.  5. Serum creatinine levels will be ordered daily for the first week of therapy and at least twice weekly for subsequent weeks.    Toñito Trevizo RPH

## 2022-08-31 NOTE — PROGRESS NOTES
SPIRITUAL HEALTH SERVICES Progress Note  [facility]  73    Visited with PT per request to complete paperwork to designate daughter as health care agent prior to care conference.    Delivered form and supporting documentation to PT at bedside. Briefly described the purpose of the Health Care Directive and the requirements for completing and witnessing of document.    PT declines offer of assistance for completing document today. PT indicates that she will complete document tomorrow.    SH to follow-up 09/01/2022 per PT's request.    Sekou Goldberg  Associate

## 2022-09-01 LAB
BACTERIA CSF CULT: NORMAL
GLUCOSE BLDC GLUCOMTR-MCNC: 103 MG/DL (ref 70–99)
GLUCOSE BLDC GLUCOMTR-MCNC: 108 MG/DL (ref 70–99)
GLUCOSE BLDC GLUCOMTR-MCNC: 126 MG/DL (ref 70–99)
GLUCOSE BLDC GLUCOMTR-MCNC: 80 MG/DL (ref 70–99)
GLUCOSE BLDC GLUCOMTR-MCNC: 91 MG/DL (ref 70–99)
GLUCOSE BLDC GLUCOMTR-MCNC: 94 MG/DL (ref 70–99)

## 2022-09-01 PROCEDURE — 258N000003 HC RX IP 258 OP 636: Performed by: HOSPITALIST

## 2022-09-01 PROCEDURE — 250N000013 HC RX MED GY IP 250 OP 250 PS 637: Performed by: HOSPITALIST

## 2022-09-01 PROCEDURE — 999N000190 HC STATISTIC VAT ROUNDS

## 2022-09-01 PROCEDURE — 250N000013 HC RX MED GY IP 250 OP 250 PS 637: Performed by: INTERNAL MEDICINE

## 2022-09-01 PROCEDURE — 99232 SBSQ HOSP IP/OBS MODERATE 35: CPT | Performed by: INTERNAL MEDICINE

## 2022-09-01 PROCEDURE — 258N000003 HC RX IP 258 OP 636: Performed by: INTERNAL MEDICINE

## 2022-09-01 PROCEDURE — 99232 SBSQ HOSP IP/OBS MODERATE 35: CPT | Performed by: HOSPITALIST

## 2022-09-01 PROCEDURE — 250N000013 HC RX MED GY IP 250 OP 250 PS 637

## 2022-09-01 PROCEDURE — 120N000001 HC R&B MED SURG/OB

## 2022-09-01 PROCEDURE — 250N000011 HC RX IP 250 OP 636: Performed by: INTERNAL MEDICINE

## 2022-09-01 PROCEDURE — 250N000011 HC RX IP 250 OP 636: Performed by: HOSPITALIST

## 2022-09-01 RX ORDER — IBUPROFEN 200 MG
400 TABLET ORAL EVERY 6 HOURS PRN
Status: COMPLETED | OUTPATIENT
Start: 2022-09-01 | End: 2022-09-06

## 2022-09-01 RX ADMIN — MICONAZOLE NITRATE: 20 CREAM TOPICAL at 08:51

## 2022-09-01 RX ADMIN — Medication 1 PACKET: at 13:04

## 2022-09-01 RX ADMIN — Medication 15 ML: at 08:33

## 2022-09-01 RX ADMIN — Medication 1 DROP: at 17:00

## 2022-09-01 RX ADMIN — LEVETIRACETAM 1000 MG: 100 SOLUTION ORAL at 20:06

## 2022-09-01 RX ADMIN — LEVETIRACETAM 1000 MG: 100 SOLUTION ORAL at 08:33

## 2022-09-01 RX ADMIN — ACETAMINOPHEN 650 MG: 325 SUSPENSION ORAL at 20:25

## 2022-09-01 RX ADMIN — IBUPROFEN 400 MG: 200 TABLET, FILM COATED ORAL at 18:45

## 2022-09-01 RX ADMIN — VANCOMYCIN HYDROCHLORIDE 125 MG: KIT at 21:38

## 2022-09-01 RX ADMIN — MEROPENEM 2 G: 1 INJECTION, POWDER, FOR SOLUTION INTRAVENOUS at 12:20

## 2022-09-01 RX ADMIN — OXCARBAZEPINE 450 MG: 300 SUSPENSION ORAL at 21:38

## 2022-09-01 RX ADMIN — Medication 1 PACKET: at 08:32

## 2022-09-01 RX ADMIN — ACETAMINOPHEN 650 MG: 325 SUSPENSION ORAL at 08:50

## 2022-09-01 RX ADMIN — VANCOMYCIN HYDROCHLORIDE 125 MG: KIT at 08:33

## 2022-09-01 RX ADMIN — VANCOMYCIN HYDROCHLORIDE 1500 MG: 10 INJECTION, POWDER, LYOPHILIZED, FOR SOLUTION INTRAVENOUS at 13:04

## 2022-09-01 RX ADMIN — Medication 1 PACKET: at 17:58

## 2022-09-01 RX ADMIN — ACETAMINOPHEN 650 MG: 325 SUSPENSION ORAL at 14:55

## 2022-09-01 RX ADMIN — Medication 40 MG: at 08:32

## 2022-09-01 RX ADMIN — VANCOMYCIN HYDROCHLORIDE 125 MG: KIT at 12:20

## 2022-09-01 RX ADMIN — VANCOMYCIN HYDROCHLORIDE 125 MG: KIT at 17:58

## 2022-09-01 RX ADMIN — MEROPENEM 2 G: 1 INJECTION, POWDER, FOR SOLUTION INTRAVENOUS at 20:06

## 2022-09-01 RX ADMIN — QUETIAPINE FUMARATE 50 MG: 50 TABLET ORAL at 21:38

## 2022-09-01 RX ADMIN — MICONAZOLE NITRATE: 20 CREAM TOPICAL at 20:21

## 2022-09-01 RX ADMIN — IBUPROFEN 400 MG: 200 TABLET, FILM COATED ORAL at 11:17

## 2022-09-01 RX ADMIN — TOPIRAMATE 50 MG: 50 TABLET ORAL at 21:38

## 2022-09-01 RX ADMIN — MEROPENEM 2 G: 1 INJECTION, POWDER, FOR SOLUTION INTRAVENOUS at 04:56

## 2022-09-01 ASSESSMENT — ACTIVITIES OF DAILY LIVING (ADL)
ADLS_ACUITY_SCORE: 43
ADLS_ACUITY_SCORE: 47
ADLS_ACUITY_SCORE: 41
ADLS_ACUITY_SCORE: 43
ADLS_ACUITY_SCORE: 47
ADLS_ACUITY_SCORE: 45
ADLS_ACUITY_SCORE: 45
ADLS_ACUITY_SCORE: 47
ADLS_ACUITY_SCORE: 41

## 2022-09-01 NOTE — PROGRESS NOTES
[facility]  Hospitalist Progress Note       When I evaluated patient: 09/01/2022      Subjective:     Care assumed, chart reviewed, discussed with RN and SW/CC and evaluated patient. Patient has ongoing headache, otherwise denies nausea, vomiting. No fever. No abd pain. Afebrile          Assessment and Plan:        Julisa Chaney is a 77-year-old woman with PMH of trigeminal neuralgia who was recently admitted to [facility] on 7/19 for severe sepsis due to COVID-19 infection and suspected bacterial pneumonia. She was treated with 5 days of remdesivir and did not require any steroids, also treated with IV vancomycin and IV meropenem.     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  She was switched to IV vancomycin, cefepime and metronidazole and transferred to [facility] for neurosurgery assessment. MRI brain w/wo contrast 7/30 showed ventriculitis with probable abscess. Neurosurgery/neurology/infectious disease/oncology consulted. Patient taken to the OR on 7/31 for left frontal ventriculostomy.   Since then has remained on broad spectrum antibiotics.  In addition she has developed C. Difficile colitis and is on treatment.  She has continued to have a significant encephalopathy.     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  - flow cytometry was done which did not show any evidence of malignancy.  - s/p Left frontal ventriculostomy on 7/31/2022, EVD removed 8/8; CSF cultures have remained negative; CSF counts decreasing 469---233 ---56 (last CSF from 8/18)  - Head CT from 8/19 with stable presumed  vasogenic edema in the white matter of the anterior inferior left frontal lobe related to ventriculitis of the left lateral ventricle; stable ventriculomegaly of the left lateral ventricle   -Remains afebrile; leukocytosis resolved ; was initially on vancomycin, cefepime, and metronidazole, then Meropenem and Vancomycin; ID following, plan for 6 weeks treatment until 9/8/22  -Has been having persistent headaches; reevaluated by neurosurgery 8/27 and recommended head CT, done on 8/29-->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 The left temporal horn remains slightly dilated but no samuel hydrocephalus. No acute changes.  - continue scheduled Tylenol 650 mg TID. PRN ibuprofen ordered  - trying to avoid any narcotics with her confusion/ encephalopathy            Acute infectious encephalopathy due to above:  mentation much improved since admission ; fluctuating at times ; will not engage in conversation most of the times  - Continue to treat underlying issues as noted above.    - Maintain normal sleep/wake cycle as able.  - 8/23 discontinued prn benadryl, narcotics; minimize narcotic; if needed for headache, will plan to order as one time dose rather than prn available    Suicidal throughs/agitation  Depression  With improvement in her mentation, she has started becoming at times restless and agitated. On 8/27, reported having suicidal thoughts. No plans/attempts.  - initiated suicide precautions and sitter, evaluated by psychiatry service, discontinued.   - reassess periodically for ongoing need  - anti depressant not recommended currently in the setting of delirium/encephalopathy  - HS seroquel increased        C. difficile colitis:  Patient was noted to have copious diarrhea on 8/1 and was noted positive for C. Difficile.  -Continue oral vancomycin - till 9/22  - Rectal tube came off 8/28 still with some diarrhea  - ID has signed off     Acute on chronic anemia:  Prior labs show baseline hemoglobin  of about 9-10.   -Hb on admission was 11.7, slowly trended down, got 1 unit PRBC on 8/8/22 for Hb 6.9  -Received IV iron infusion x3 with first on 8/11/22.  -Iron panel on 7/30/22 showed that her total iron was low at 14, binding capacity was low at 28, iron saturation was 6 and B12 was high and folate was normal  -No obvious ongoing bleeding noted; Hb has now remained stable around 9 to 10  -Continue to monitor hgb periodically     History of trigeminal neuralgia  - Continue PTA Trileptal , Keppra and Topamax.     History of chronic pyloric ulcer  She had EGD done in 2020 which showed gastroduodenitis with a duodenal stricture.  - Continue PPI.     Hyperglycemia  Recent HbA1c was 5.6 on 7/19/22. Was not on any medication prior to admission.  - Continue medium dose sliding scale insulin every 4 hours while on tube feeds  - started on Lantus 10 units bedtime, continue .      Intermittent Hypokalemia, Hypo/hypernatremia, Hypophosphatemia:  - Replacement per protocol.  - Continue to follow trend      Hypernatremia: resolved.  - Continue free water flushes 200 ml  every 4 hrs     Severe protein calorie malnutrition:  - Nutrition following for tube feeds via G tube (placed 8/16) ; getting free water flushes  - Will need SLP consult when more able to participate     Physical deconditioning:  - will need TCU placement; declined by LTACH; continue therapy as able to participate  - SW following for disposition    Diet: NPO for Medical/Clinical Reasons Except for: Other; Specify: NPO For oral intake and gastric feedings for 6 hours post insertion Gastrostomy G/GJ tube. May feed through Jejunal or Distal PORT immediately for GJ tubes ONLY.  Adult Formula Drip Feeding: Continuous Vital 1.5; Gastrostomy; Goal Rate: 50; mL/hr; Medication - Feeding Tube Flush Frequency: At least 15-30 mL water before and after medication administration and with tube clogging; Amount to Send (Nutrition us...      DVT Prophylaxis: Pneumatic  Compression Devices    Central Lines: PRESENT  PICC Triple Lumen 07/22/22 Right Basilic Vascular access-Site Assessment: WDL    Code Status: Full Code     Per psych eval on 8/30 patient does not display decision making capacity and recommended surrogate decision maker. Patient would like her DTR be that person.    SW to help us with that.        Disposition Plan    Expected Discharge Date: unclear at this time; pending clinical improvement  - planned for TCU; declined by LTACH; SW following for disposition    Lakesha Callejas MD  Hospitalist              Physical Exam:      Blood pressure 110/62, pulse 75, temperature 97.6  F (36.4  C), temperature source Axillary, resp. rate 16, weight 48 kg (105 lb 13.1 oz), SpO2 97 %, not currently breastfeeding.  Vitals:    08/24/22 0629 08/25/22 0610 08/31/22 0500   Weight: 48.3 kg (106 lb 7.7 oz) 48.3 kg (106 lb 7.7 oz) 48 kg (105 lb 13.1 oz)     Vital Signs with Ranges  Temp:  [97  F (36.1  C)-97.7  F (36.5  C)] 97.6  F (36.4  C)  Pulse:  [75-92] 75  Resp:  [14-16] 16  BP: (110-131)/(55-92) 110/62  SpO2:  [96 %-97 %] 97 %  I/O's Last 24 hours  I/O last 3 completed shifts:  In: 1230 [NG/GT:1230]  Out: -     Constitutional: alert, awake, miniamally conversant- speaks with eyes closed, oriented to self and place; follows commands; moving all extremities; in no apparent distress; does not engage in conversation   HEENT: PERRLA, neck supple    Oral cavity: Dry oral mucosa   Cardiovascular: Regularl s1 s2,  no murmur   Lungs: B/l clear to auscultation, no wheezes or crepitations. Normal WOB   Abdomen: Soft, nt, nd, no guarding, rigidity or rebound; BS +   LE : No edema   Musculoskeletal: moving all extremities equally   Neuro:/psych AAOxself place, month year.                     Medications:          banatrol plus  1 packet Per Feeding Tube TID w/meals     insulin aspart  1-6 Units Subcutaneous Q4H     insulin glargine  10 Units Subcutaneous At Bedtime     levETIRAcetam  1,000 mg  Oral or Feeding Tube Q12H     meropenem  2 g Intravenous Q8H     miconazole with skin protectant   Topical BID     multivitamins w/minerals  15 mL Per Feeding Tube Daily     OXcarbazepine  450 mg Oral or Feeding Tube At Bedtime     pantoprazole  40 mg Oral or Feeding Tube QAM AC     QUEtiapine  50 mg Oral At Bedtime     sodium chloride (PF)  10-40 mL Intracatheter Q7 Days     topiramate  50 mg Oral or Feeding Tube At Bedtime     vancomycin  125 mg Oral or Feeding Tube 4x Daily     vancomycin  1,500 mg Intravenous Q24H     PRN Meds: acetaminophen **OR** acetaminophen, artificial tears, glucose **OR** dextrose **OR** glucagon, guaiFENesin, ibuprofen, ipratropium - albuterol 0.5 mg/2.5 mg/3 mL, - MEDICATION INSTRUCTIONS -, melatonin, metoprolol, miconazole with skin protectant, naloxone **OR** naloxone **OR** naloxone **OR** naloxone, ondansetron **OR** ondansetron, prochlorperazine **OR** prochlorperazine **OR** prochlorperazine, sodium chloride (PF), sodium chloride (PF)         Data:      All new lab and imaging data was reviewed.   Recent Labs   Lab Test 08/26/22  0557 08/23/22  1920 08/21/22  0514 07/30/22  0559 07/29/22  1414 07/28/22  0408 07/27/22  2231 07/20/22  0923 07/19/22  2255   WBC 6.4 7.4 6.6   < >  --    < > 6.8   < >  --    HGB 10.5* 10.2* 9.4*   < >  --    < > 9.6*   < >  --    MCV 84 84 86   < >  --    < > 73*   < >  --    * 553* 638*   < >  --    < > 547*   < >  --    INR  --   --   --   --  1.34*  --  1.13  --  1.36*    < > = values in this interval not displayed.      Recent Labs   Lab Test 09/01/22  1606 09/01/22  1219 09/01/22  0834 08/31/22  1328 08/31/22  0630 08/29/22  0813 08/29/22  0610 08/28/22  0723 08/28/22  0545 08/26/22  1237 08/26/22  0557   NA  --   --   --   --  142  --  138  --   --   --  138   POTASSIUM  --   --   --   --  3.8  --  4.0  --  3.9   < > 3.8   CHLORIDE  --   --   --   --  110*  --  107  --   --   --  107   CO2  --   --   --   --  28  --  27  --   --   --  29    BUN  --   --   --   --  18  --  14  --   --   --  12   CR  --   --   --   --  0.43*  --  0.41*  --  0.39*  --  0.40*   ANIONGAP  --   --   --   --  4  --  4  --   --   --  2*   ADY  --   --   --   --  9.2  --  8.9  --   --   --  8.6   * 126* 80   < > 115*   < > 104*   < >  --    < > 95    < > = values in this interval not displayed.     No lab results found.    Invalid input(s): TROP, TROPONINIES

## 2022-09-01 NOTE — PLAN OF CARE
Pt here with brain abscess and sepsis s/p L ventriculostomy. A/Ox2. Disoriented to time and situation. Minimal command following. Can move all extremities. VSS RA. TF at 50mL/hr. 200mL flushes Q4hrs. NPO. A2/lift. Incontinent. R PICC. Ibuprofen and tylenol PRN for pain. Plan for discharge to TCU, pending. ID, neurosurgery and psych following.

## 2022-09-01 NOTE — PROGRESS NOTES
CLINICAL NUTRITION SERVICES - REASSESSMENT NOTE      Malnutrition: (8/25)  % Weight Loss: > 10% in 6 months (severe malnutrition) - per 8/4 RD note  % Intake:  No decreased intake noted - pt meeting needs from EN  Subcutaneous Fat Loss:  Orbital region moderate depletion - per 8/4 RD note  Muscle Loss:  Temporal region moderate depletion, Clavicle bone region moderate depletion, Acromion bone region moderate-severe depletion and Dorsal hand region severe depletion - per 8/4 RD note  Fluid Retention:  None noted     Malnutrition Diagnosis: Severe malnutrition  In Context of:  Acute illness or injury  Chronic illness or disease       EVALUATION OF PROGRESS TOWARD GOALS   Diet:    NPO    Nutrition Support:    Type of Feeding Tube: 18 Fr PEG (placed 8/16)  Enteral Frequency:  Continuous  Enteral Regimen: Vital 1.5 @ 50 mL/hr  Total Enteral Provisions: 1800 cals, 82 gm pro (1.7 gm/kg), 7 gm fiber, 917 mL free water, 224 gm CHO  1 packet Banatrol TID = 120 cals, 6 gm fiber  Free Water Flush: 200 mL every 4 hrs (8/20 - MD order)     8/15: Banatrol TID (for stooling) = 120 cals, 6 gm pro   T: 1920 (40 cals/kg), 31 gm fiber, 2117 mL free water     Liquid MVI/M daily     Intake/Tolerance:    Chart reviewed  TF running at goal rate  BM x6 yesterday - pt continues with loose BMs, on vanco and meropenem  Currently on a semi-elemental formula and on Banatrol TID for hx cdiff and diarrhea      ASSESSED NUTRITION NEEDS:  Dosing Weight 48.3 kg (7/27 - admit wt)  Estimated Energy Needs: 4787-6945 kcals (35-40 Kcal/Kg)  Justification: repletion and underweight  Estimated Protein Needs: 70-95 grams protein (1.5-2 g pro/Kg)  Justification: repletion       NEW FINDINGS:     Wts have fluctuated - no wt in the past week    08/25/22 0610 48.3 kg (106 lb 7.7 oz) Bed scale   08/24/22 0629 48.3 kg (106 lb 7.7 oz) Bed scale   08/22/22 0933 56 kg (123 lb 7.3 oz) --     08/14/22 0500 55.7 kg (122 lb 12.7 oz) Bed scale   08/13/22 0600 59.6 kg (131  lb 6.3 oz) Bed scale   08/12/22 0600 53.8 kg (118 lb 9.7 oz) Bed scale   08/11/22 0600 55 kg (121 lb 4.1 oz) Bed scale     07/28/22 0600 49.5 kg (109 lb 2 oz) Bed scale   07/28/22 0000 -- Bed scale   07/27/22 2100 48.3 kg (106 lb 7.7 oz)   ADMIT Bed scale         Previous Goals (8/29):   EN to meet % estimated needs  Evaluation: Met    Previous Nutrition Diagnosis (8/29):   No nutrition diagnosis identified at this time  Evaluation: No change        CURRENT NUTRITION DIAGNOSIS  No nutrition diagnosis identified at this time as EN meeting estimated needs    INTERVENTIONS  Recommendations / Nutrition Prescription  NPO    Recommend continue with current EN regimen      Goals  EN to meet % estimated needs      MONITORING AND EVALUATION:  Progress towards goals will be monitored and evaluated per protocol and Practice Guidelines

## 2022-09-01 NOTE — PROGRESS NOTES
[facility] [place]    Infectious Disease Progress Note    Date of Service : 09/01/2022        Assessment:  Patient transferred 7/27 from [facility] for concern for brain abscess (recovered COVID) - MRI suggestive of ventriculitis with left lateral ventricle abscess. Has been on broad spectrum antibiotics, now on Meropenem/Vancomycin, mental status improved, s/p left frontal ventriculostomy CSF cxs have remained negative. Course complicated by development of C.diff colitis.   Headache is back, not too bad and improves with tylenol. Discussed need for imaging. S/P CT scan: 8/ 29 IMPRESSION:  1.  The previously noted hydrocephalic left lateral ventricle has decreased in size. The left temporal horn remains slightly dilated but no samuel hydrocephalus.  2.  No acute findings.     Recommendations  1. Continue current antimicrobial therapy Vancomycin, Meropenem.  6 week treatment planned until 9/8  2. CT scan reviewed, no changes based on the CT scan.   3. Oral vancomycin 125 mg BID to be continued till 9/22    Cayetano Schmitt MD    Interval History   Remains afebrile   Flat affect , minimum response to ques  Does not complaint much   Labs and vancomycin levels reviewed   Ct scan reviewed  No changes to ROS  No changes to Social history, Family history and medical history     Afebrile   Physical Exam   Temp: 97.7  F (36.5  C) Temp src: Axillary BP: 126/64 Pulse: 83   Resp: 16 SpO2: 97 % O2 Device: None (Room air)    Vitals:    08/24/22 0629 08/25/22 0610 08/31/22 0500   Weight: 48.3 kg (106 lb 7.7 oz) 48.3 kg (106 lb 7.7 oz) 48 kg (105 lb 13.1 oz)     Vital Signs with Ranges  Temp:  [97  F (36.1  C)-97.7  F (36.5  C)] 97.7  F (36.5  C)  Pulse:  [80-92] 83  Resp:  [16] 16  BP: (115-131)/(55-92) 126/64  SpO2:  [96 %-97 %] 97 %    Constitutional:awake, does not complaint of headache today , responds appropriately, moves all extremities  Lungs: Clear to auscultation bilaterally, no crackles or  wheezing  Cardiovascular: Regular rate and rhythm, normal S1 and S2, and no murmur noted  Abdomen: soft  Skin: No rash    Other:    Medications     - MEDICATION INSTRUCTIONS -         banatrol plus  1 packet Per Feeding Tube TID w/meals     insulin aspart  1-6 Units Subcutaneous Q4H     insulin glargine  10 Units Subcutaneous At Bedtime     levETIRAcetam  1,000 mg Oral or Feeding Tube Q12H     meropenem  2 g Intravenous Q8H     miconazole with skin protectant   Topical BID     multivitamins w/minerals  15 mL Per Feeding Tube Daily     OXcarbazepine  450 mg Oral or Feeding Tube At Bedtime     pantoprazole  40 mg Oral or Feeding Tube QAM AC     QUEtiapine  50 mg Oral At Bedtime     sodium chloride (PF)  10-40 mL Intracatheter Q7 Days     topiramate  50 mg Oral or Feeding Tube At Bedtime     vancomycin  125 mg Oral or Feeding Tube 4x Daily     vancomycin  1,500 mg Intravenous Q24H       Data   All microbiology laboratory data reviewed.  Recent Labs   Lab Test 08/26/22  0557 08/23/22  1920 08/21/22  0514   WBC 6.4 7.4 6.6   HGB 10.5* 10.2* 9.4*   HCT 36.1 35.5 33.6*   MCV 84 84 86   * 553* 638*     Recent Labs   Lab Test 08/31/22  0630 08/29/22  0610 08/28/22  0545   CR 0.43* 0.41* 0.39*     Recent Labs   Lab Test 08/08/20  0212   SED 13

## 2022-09-01 NOTE — PLAN OF CARE
Goal Outcome Evaluation:    Plan of Care Reviewed With: patient     Overall Patient Progress: no change    Pt here with brain abscess. A&Ox2, disoriented to time and situation. Neuros difficult to assess due to limited pt participation in assessment.  VSS. Tele SR. NPO. TF @ goal 50 ml/hr Takes pills via PEG. Up with A2 lift. Pt c/o 8/10 HA, PRN pain meds given, MD aware. Pt scoring green on the Aggression Stop Light Tool. Plan TCU pending placement. Discharge pending.    Suicide precautions and sitter discontinued this shift by MD. Bmx2 this shift. Enteric precautions maintained.

## 2022-09-01 NOTE — PLAN OF CARE
Goal Outcome Evaluation:          Overall Patient Progress: no change    Outcome Evaluation: TF continues at goal: Vital 1.5 (semi-elemental formula) @ 50 mL/hr.  Still with noted loose BMs (on meropenem and vanco).  No changes at this time.

## 2022-09-02 LAB
GLUCOSE BLDC GLUCOMTR-MCNC: 103 MG/DL (ref 70–99)
GLUCOSE BLDC GLUCOMTR-MCNC: 111 MG/DL (ref 70–99)
GLUCOSE BLDC GLUCOMTR-MCNC: 113 MG/DL (ref 70–99)
GLUCOSE BLDC GLUCOMTR-MCNC: 123 MG/DL (ref 70–99)
GLUCOSE BLDC GLUCOMTR-MCNC: 78 MG/DL (ref 70–99)
GLUCOSE BLDC GLUCOMTR-MCNC: 94 MG/DL (ref 70–99)
PHOSPHATE SERPL-MCNC: 2.5 MG/DL (ref 2.5–4.5)
POTASSIUM BLD-SCNC: 3.8 MMOL/L (ref 3.4–5.3)

## 2022-09-02 PROCEDURE — 250N000013 HC RX MED GY IP 250 OP 250 PS 637: Performed by: HOSPITALIST

## 2022-09-02 PROCEDURE — 84100 ASSAY OF PHOSPHORUS: CPT | Performed by: HOSPITALIST

## 2022-09-02 PROCEDURE — 999N000190 HC STATISTIC VAT ROUNDS

## 2022-09-02 PROCEDURE — 258N000003 HC RX IP 258 OP 636: Performed by: HOSPITALIST

## 2022-09-02 PROCEDURE — 250N000011 HC RX IP 250 OP 636: Performed by: HOSPITALIST

## 2022-09-02 PROCEDURE — 999N000040 HC STATISTIC CONSULT NO CHARGE VASC ACCESS

## 2022-09-02 PROCEDURE — 250N000013 HC RX MED GY IP 250 OP 250 PS 637

## 2022-09-02 PROCEDURE — 258N000003 HC RX IP 258 OP 636: Performed by: INTERNAL MEDICINE

## 2022-09-02 PROCEDURE — 84132 ASSAY OF SERUM POTASSIUM: CPT | Performed by: HOSPITALIST

## 2022-09-02 PROCEDURE — 120N000001 HC R&B MED SURG/OB

## 2022-09-02 PROCEDURE — 250N000013 HC RX MED GY IP 250 OP 250 PS 637: Performed by: INTERNAL MEDICINE

## 2022-09-02 PROCEDURE — 99232 SBSQ HOSP IP/OBS MODERATE 35: CPT | Performed by: HOSPITALIST

## 2022-09-02 PROCEDURE — 250N000011 HC RX IP 250 OP 636: Performed by: INTERNAL MEDICINE

## 2022-09-02 RX ORDER — WATER 10 ML/10ML
INJECTION INTRAMUSCULAR; INTRAVENOUS; SUBCUTANEOUS
Status: DISPENSED
Start: 2022-09-02 | End: 2022-09-02

## 2022-09-02 RX ADMIN — VANCOMYCIN HYDROCHLORIDE 125 MG: KIT at 08:14

## 2022-09-02 RX ADMIN — ACETAMINOPHEN 650 MG: 325 SUSPENSION ORAL at 13:51

## 2022-09-02 RX ADMIN — MEROPENEM 2 G: 1 INJECTION, POWDER, FOR SOLUTION INTRAVENOUS at 19:39

## 2022-09-02 RX ADMIN — IBUPROFEN 400 MG: 200 TABLET, FILM COATED ORAL at 16:23

## 2022-09-02 RX ADMIN — VANCOMYCIN HYDROCHLORIDE 125 MG: KIT at 12:39

## 2022-09-02 RX ADMIN — OXCARBAZEPINE 450 MG: 300 SUSPENSION ORAL at 21:03

## 2022-09-02 RX ADMIN — INSULIN GLARGINE 8 UNITS: 100 INJECTION, SOLUTION SUBCUTANEOUS at 21:03

## 2022-09-02 RX ADMIN — Medication 1 PACKET: at 10:35

## 2022-09-02 RX ADMIN — ALTEPLASE 2 MG: 2.2 INJECTION, POWDER, LYOPHILIZED, FOR SOLUTION INTRAVENOUS at 12:38

## 2022-09-02 RX ADMIN — LEVETIRACETAM 1000 MG: 100 SOLUTION ORAL at 21:02

## 2022-09-02 RX ADMIN — Medication 1 PACKET: at 13:53

## 2022-09-02 RX ADMIN — QUETIAPINE FUMARATE 50 MG: 50 TABLET ORAL at 21:03

## 2022-09-02 RX ADMIN — VANCOMYCIN HYDROCHLORIDE 125 MG: KIT at 18:57

## 2022-09-02 RX ADMIN — ALTEPLASE 2 MG: 2.2 INJECTION, POWDER, LYOPHILIZED, FOR SOLUTION INTRAVENOUS at 11:23

## 2022-09-02 RX ADMIN — MEROPENEM 2 G: 1 INJECTION, POWDER, FOR SOLUTION INTRAVENOUS at 12:53

## 2022-09-02 RX ADMIN — MEROPENEM 2 G: 1 INJECTION, POWDER, FOR SOLUTION INTRAVENOUS at 04:46

## 2022-09-02 RX ADMIN — MICONAZOLE NITRATE: 20 CREAM TOPICAL at 21:11

## 2022-09-02 RX ADMIN — Medication 15 ML: at 08:14

## 2022-09-02 RX ADMIN — Medication 1 PACKET: at 18:57

## 2022-09-02 RX ADMIN — VANCOMYCIN HYDROCHLORIDE 1500 MG: 10 INJECTION, POWDER, LYOPHILIZED, FOR SOLUTION INTRAVENOUS at 13:41

## 2022-09-02 RX ADMIN — ACETAMINOPHEN 650 MG: 325 SUSPENSION ORAL at 04:59

## 2022-09-02 RX ADMIN — MICONAZOLE NITRATE: 20 CREAM TOPICAL at 08:12

## 2022-09-02 RX ADMIN — ACETAMINOPHEN 650 MG: 325 SUSPENSION ORAL at 19:39

## 2022-09-02 RX ADMIN — Medication 40 MG: at 08:13

## 2022-09-02 RX ADMIN — TOPIRAMATE 50 MG: 50 TABLET ORAL at 21:03

## 2022-09-02 RX ADMIN — LEVETIRACETAM 1000 MG: 100 SOLUTION ORAL at 08:13

## 2022-09-02 RX ADMIN — VANCOMYCIN HYDROCHLORIDE 125 MG: KIT at 21:02

## 2022-09-02 ASSESSMENT — ACTIVITIES OF DAILY LIVING (ADL)
ADLS_ACUITY_SCORE: 43
ADLS_ACUITY_SCORE: 43
ADLS_ACUITY_SCORE: 49
ADLS_ACUITY_SCORE: 47
ADLS_ACUITY_SCORE: 47
ADLS_ACUITY_SCORE: 49
ADLS_ACUITY_SCORE: 47
ADLS_ACUITY_SCORE: 43
ADLS_ACUITY_SCORE: 43
ADLS_ACUITY_SCORE: 47

## 2022-09-02 NOTE — PLAN OF CARE
[id number]    See Flowsheets for more specific documentation, lab and imaging results, and vitals.    Shift Summary    Ambulation: Ax2 w/ Lift  Neuro: disoriented to time and situation. Inconsistent with following commands. Withdrawn at times. Also anxious at times. Generalized weakness and mildly impaired mobility. Able to turn in bed and move all extremities. PERRLA  Respiratory: LS dim and equal bilat  Cardiac: SR  GI: NPO w/ TF. Changed tubing today. Frequent incontinent stooling - dark brown and loose  : incontinent  Musculoskeletal: generalized weakness  Skin: rash to groin area. Treated per plan of care    LDAs: triple lumen PICC - received cathflo order and was successful with obtaining blood return for labs.    PEG tube with TF @ 50mL/hr q4hr 200mL flushes     Vital signs:  Temp: 97.7  F (36.5  C) Temp src: Axillary BP: 132/59 Pulse: 80   Resp: 16 SpO2: 95 % O2 Device: None (Room air)     Significant Labs: K 3.8, Phos 2.5, , 94, 113  Significant Events: received pain medication for headache. Tylenol and ibuprofen. Pt able to rest after administration. Pt cleaned up multiple times throughout shift. PICC dressing changed by VAT team today. Cathflo used to obtain blood return in PICC.   Treatment/Care Plan: VS & neuro assessments q 8hr. Pt turned/weight shifting frequently. Skin care per plan of care. OT.    Discharge Disposition: TCU TBD

## 2022-09-02 NOTE — PROVIDER NOTIFICATION
Web based page to Dr. Yeboah:    Pt's PICC flushes but does not draw blood back. need to do morning labs. Do you want to order cathflo?

## 2022-09-02 NOTE — PROGRESS NOTES
[facility]    Medicine Progress Note - Hospitalist Service    Date of Admission:  7/27/2022    Assessment & Plan            Julisa Chaney is a 77 year old female admitted on 7/27/2022.  PMH of trigeminal neuralgia who was recently admitted to [facility] on 7/19 for severe sepsis due to COVID-19 infection and suspected bacterial pneumonia. She was treated with 5 days of remdesivir and did not require any steroids, also treated with IV vancomycin and IV meropenem.     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  She was switched to IV vancomycin, cefepime and metronidazole and transferred to [facility] for neurosurgery assessment. MRI brain w/wo contrast 7/30 showed ventriculitis with probable abscess. Neurosurgery/neurology/infectious disease/oncology consulted. Patient taken to the OR on 7/31 for left frontal ventriculostomy.   Since then has remained on broad spectrum antibiotics.  In addition she has developed C. Difficile colitis and is on treatment.  She has continued to have a significant encephalopathy.     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  - flow cytometry was done which did not show any evidence of malignancy.  - s/p Left frontal ventriculostomy on 7/31/2022, EVD removed 8/8; CSF cultures have remained negative; CSF counts decreasing 469---233 ---56 (last CSF from 8/18)  - Head CT from 8/19 with stable presumed vasogenic edema in the white matter of the anterior inferior left frontal lobe related to ventriculitis of the left lateral ventricle; stable ventriculomegaly of the left  lateral ventricle   -Remains afebrile; leukocytosis resolved ; was initially on vancomycin, cefepime, and metronidazole, then Meropenem and Vancomycin; ID following, plan for 6 weeks treatment until 9/8/22  -Has been having persistent headaches; reevaluated by neurosurgery 8/27 and recommended head CT, done on 8/29-->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 The left temporal horn remains slightly dilated but no samuel hydrocephalus. No acute changes.  - continue scheduled Tylenol 650 mg TID. PRN ibuprofen ordered  - trying to avoid any narcotics with her confusion/ encephalopathy               Acute infectious encephalopathy due to above:  mentation much improved since admission ; fluctuating at times ; will not engage in conversation most of the times  - Continue to treat underlying issues as noted above.   - Maintain normal sleep/wake cycle as able.  - 8/23 discontinued prn benadryl, narcotics; minimize narcotic; if needed for headache, will plan to order as one time dose rather than prn available     Suicidal throughs/agitation  Depression  With improvement in her mentation, she has started becoming at times restless and agitated. On 8/27, reported having suicidal thoughts. No plans/attempts.  - initiated suicide precautions and sitter, evaluated by psychiatry service, discontinued.   - reassess periodically for ongoing need  - anti depressant not recommended currently in the setting of delirium/encephalopathy  - HS seroquel increased         C. difficile colitis:  Patient was noted to have copious diarrhea on 8/1 and was noted positive for C. Difficile.  -Continue oral vancomycin - till 9/22  - Rectal tube came off 8/28 still with some diarrhea  - ID has signed off     Acute on chronic anemia:  Prior labs show baseline hemoglobin of about 9-10.   -Hb on admission was 11.7, slowly trended down, got 1 unit PRBC on 8/8/22 for Hb 6.9  -Received IV iron infusion x3 with first on 8/11/22.  -Iron  panel on 7/30/22 showed that her total iron was low at 14, binding capacity was low at 28, iron saturation was 6 and B12 was high and folate was normal  -No obvious ongoing bleeding noted; Hb has now remained stable around 9 to 10  -Continue to monitor hgb periodically     History of trigeminal neuralgia  - Continue PTA Trileptal , Keppra and Topamax.     History of chronic pyloric ulcer  She had EGD done in 2020 which showed gastroduodenitis with a duodenal stricture.  - Continue PPI.     Hyperglycemia  Recent HbA1c was 5.6 on 7/19/22. Was not on any medication prior to admission.  - Continue medium dose sliding scale insulin every 4 hours while on tube feeds  - decrease lantus to 8 units nightly 9/2      Intermittent Hypokalemia, Hypo/hypernatremia, Hypophosphatemia:  - Replacement per protocol.  - Continue to follow trend     Hypernatremia: resolved.  - Continue free water flushes 200 ml  every 4 hrs     Severe protein calorie malnutrition:  - Nutrition following for tube feeds via G tube (placed 8/16) ; getting free water flushes  - Will need SLP consult when more able to participate     Physical deconditioning:  - will need TCU placement; declined by LTACH; continue therapy as able to participate  - SW following for disposition         Diet: NPO for Medical/Clinical Reasons Except for: Other; Specify: NPO For oral intake and gastric feedings for 6 hours post insertion Gastrostomy G/GJ tube. May feed through Jejunal or Distal PORT immediately for GJ tubes ONLY.  Adult Formula Drip Feeding: Continuous Vital 1.5; Gastrostomy; Goal Rate: 50; mL/hr; Medication - Feeding Tube Flush Frequency: At least 15-30 mL water before and after medication administration and with tube clogging; Amount to Send (Nutrition us...    DVT Prophylaxis: Pneumatic Compression Devices  Abrams Catheter: Not present  Central Lines: PRESENT  PICC Triple Lumen 07/22/22 Right Basilic Vascular access-Site Assessment: WDL  Cardiac Monitoring:  None  Code Status: Full Code      Disposition Plan      Expected Discharge Date: 09/04/2022    Discharge Delays: Insurance Authorization needed  Placement - TCU  Destination: inpatient rehabilitation facility  Discharge Comments: TCU pending, they may decline. Difficult placement        The patient's care was discussed with the Bedside Nurse and Patient.    Sekou Yeboah MD  Hospitalist Service  [facility]  Securely message with the Vocera Web Console (learn more here)  Text page via [employer] Paging/Directory         Clinically Significant Risk Factors Present on Admission                      ______________________________________________________________________    Interval History   She reports ongoing headache, abdominal pain, diarrhea all unchanged.  Denies any confusion, dyspnea, chest pain/pressure or fever/chills.  Overall reports feeling unwell.     Data reviewed today: I reviewed all medications, new labs and imaging results over the last 24 hours. I personally reviewed no images or EKG's today.    Physical Exam   Vital Signs: Temp: (!) 96.6  F (35.9  C) Temp src: Axillary BP: 134/56 Pulse: 80   Resp: 16 SpO2: 97 % O2 Device: None (Room air)    Weight: 105 lbs 13.13 oz  General Appearance: thin female in NAD  Respiratory: few basilar crackles, no wheezing or tachypnea   Cardiovascular: RRR, normal s1/s2 without murmur  GI: abdomen soft, normal bowel sounds, reports diffuse tenderness to palpation  Skin: no peripheral edema   Other: Alert and appropriate, cranial nerves grossly intact     Data   Recent Labs   Lab 09/02/22  1253 09/02/22  1208 09/02/22  0821 09/02/22  0447 08/31/22  1328 08/31/22  0630 08/29/22  0813 08/29/22  0610 08/28/22  0723 08/28/22  0545   NA  --   --   --   --   --  142  --  138  --   --    POTASSIUM 3.8  --   --   --   --  3.8  --  4.0  --  3.9   CHLORIDE  --   --   --   --   --  110*  --  107  --   --    CO2  --   --   --   --   --  28  --  27  --   --    BUN   --   --   --   --   --  18  --  14  --   --    CR  --   --   --   --   --  0.43*  --  0.41*  --  0.39*   ANIONGAP  --   --   --   --   --  4  --  4  --   --    ADY  --   --   --   --   --  9.2  --  8.9  --   --    GLC  --  94 111* 78   < > 115*   < > 104*   < >  --     < > = values in this interval not displayed.

## 2022-09-02 NOTE — PLAN OF CARE
Goal Outcome Evaluation:    Pt here with brain abscess / sepsis s/p L ventriculostomy. No to minimal command following. Alert. Spontaneously moving all extremities. VSS on RA. LS diminished.  C/O HA managed with Tylenol PRN. Tele: NSR. NPO. TF @ goal 50 ml/hr and 200ml q4h FWF. Takes pills via PEG. Up with A2 lift. Incontinent of Bladder and Bowel. Protocol: Phosphorus and Potassium WNL. MRSA/C-diff precautions maintained. Redraw this AM. Scoring Green on Aggressive Stop Light Tool. Plan TCU pending placement. Discharge pending.

## 2022-09-03 LAB
GLUCOSE BLDC GLUCOMTR-MCNC: 109 MG/DL (ref 70–99)
GLUCOSE BLDC GLUCOMTR-MCNC: 115 MG/DL (ref 70–99)
GLUCOSE BLDC GLUCOMTR-MCNC: 126 MG/DL (ref 70–99)
GLUCOSE BLDC GLUCOMTR-MCNC: 127 MG/DL (ref 70–99)
GLUCOSE BLDC GLUCOMTR-MCNC: 89 MG/DL (ref 70–99)
PHOSPHATE SERPL-MCNC: 2.3 MG/DL (ref 2.5–4.5)
POTASSIUM BLD-SCNC: 3.9 MMOL/L (ref 3.4–5.3)

## 2022-09-03 PROCEDURE — 250N000013 HC RX MED GY IP 250 OP 250 PS 637: Performed by: HOSPITALIST

## 2022-09-03 PROCEDURE — 84100 ASSAY OF PHOSPHORUS: CPT | Performed by: HOSPITALIST

## 2022-09-03 PROCEDURE — 250N000011 HC RX IP 250 OP 636: Performed by: HOSPITALIST

## 2022-09-03 PROCEDURE — 120N000001 HC R&B MED SURG/OB

## 2022-09-03 PROCEDURE — 258N000003 HC RX IP 258 OP 636: Performed by: HOSPITALIST

## 2022-09-03 PROCEDURE — 250N000013 HC RX MED GY IP 250 OP 250 PS 637: Performed by: INTERNAL MEDICINE

## 2022-09-03 PROCEDURE — 84132 ASSAY OF SERUM POTASSIUM: CPT | Performed by: HOSPITALIST

## 2022-09-03 PROCEDURE — 99232 SBSQ HOSP IP/OBS MODERATE 35: CPT | Performed by: HOSPITALIST

## 2022-09-03 PROCEDURE — 258N000003 HC RX IP 258 OP 636: Performed by: INTERNAL MEDICINE

## 2022-09-03 PROCEDURE — 250N000013 HC RX MED GY IP 250 OP 250 PS 637

## 2022-09-03 PROCEDURE — 250N000011 HC RX IP 250 OP 636: Performed by: INTERNAL MEDICINE

## 2022-09-03 RX ADMIN — VANCOMYCIN HYDROCHLORIDE 125 MG: KIT at 18:28

## 2022-09-03 RX ADMIN — TOPIRAMATE 50 MG: 50 TABLET ORAL at 22:21

## 2022-09-03 RX ADMIN — IBUPROFEN 400 MG: 200 TABLET, FILM COATED ORAL at 05:24

## 2022-09-03 RX ADMIN — ACETAMINOPHEN 650 MG: 325 SUSPENSION ORAL at 15:15

## 2022-09-03 RX ADMIN — VANCOMYCIN HYDROCHLORIDE 125 MG: KIT at 09:12

## 2022-09-03 RX ADMIN — OXCARBAZEPINE 450 MG: 300 SUSPENSION ORAL at 22:21

## 2022-09-03 RX ADMIN — ACETAMINOPHEN 650 MG: 325 SUSPENSION ORAL at 20:39

## 2022-09-03 RX ADMIN — MEROPENEM 2 G: 1 INJECTION, POWDER, FOR SOLUTION INTRAVENOUS at 03:13

## 2022-09-03 RX ADMIN — MICONAZOLE NITRATE: 20 CREAM TOPICAL at 09:14

## 2022-09-03 RX ADMIN — POTASSIUM & SODIUM PHOSPHATES POWDER PACK 280-160-250 MG 1 PACKET: 280-160-250 PACK at 09:38

## 2022-09-03 RX ADMIN — MEROPENEM 2 G: 1 INJECTION, POWDER, FOR SOLUTION INTRAVENOUS at 13:14

## 2022-09-03 RX ADMIN — Medication 15 ML: at 09:13

## 2022-09-03 RX ADMIN — Medication 40 MG: at 09:13

## 2022-09-03 RX ADMIN — POTASSIUM & SODIUM PHOSPHATES POWDER PACK 280-160-250 MG 1 PACKET: 280-160-250 PACK at 13:45

## 2022-09-03 RX ADMIN — Medication 1 PACKET: at 18:28

## 2022-09-03 RX ADMIN — Medication 1 PACKET: at 09:37

## 2022-09-03 RX ADMIN — MEROPENEM 2 G: 1 INJECTION, POWDER, FOR SOLUTION INTRAVENOUS at 20:39

## 2022-09-03 RX ADMIN — QUETIAPINE FUMARATE 50 MG: 50 TABLET ORAL at 22:21

## 2022-09-03 RX ADMIN — VANCOMYCIN HYDROCHLORIDE 125 MG: KIT at 22:21

## 2022-09-03 RX ADMIN — INSULIN GLARGINE 8 UNITS: 100 INJECTION, SOLUTION SUBCUTANEOUS at 22:20

## 2022-09-03 RX ADMIN — POTASSIUM & SODIUM PHOSPHATES POWDER PACK 280-160-250 MG 1 PACKET: 280-160-250 PACK at 06:25

## 2022-09-03 RX ADMIN — LEVETIRACETAM 1000 MG: 100 SOLUTION ORAL at 20:39

## 2022-09-03 RX ADMIN — VANCOMYCIN HYDROCHLORIDE 1500 MG: 10 INJECTION, POWDER, LYOPHILIZED, FOR SOLUTION INTRAVENOUS at 13:45

## 2022-09-03 RX ADMIN — ACETAMINOPHEN 650 MG: 325 SUSPENSION ORAL at 03:13

## 2022-09-03 RX ADMIN — ACETAMINOPHEN 650 MG: 325 SUSPENSION ORAL at 10:38

## 2022-09-03 RX ADMIN — VANCOMYCIN HYDROCHLORIDE 125 MG: KIT at 13:21

## 2022-09-03 RX ADMIN — LEVETIRACETAM 1000 MG: 100 SOLUTION ORAL at 09:12

## 2022-09-03 RX ADMIN — MICONAZOLE NITRATE: 20 CREAM TOPICAL at 20:55

## 2022-09-03 RX ADMIN — Medication 1 PACKET: at 13:21

## 2022-09-03 ASSESSMENT — ACTIVITIES OF DAILY LIVING (ADL)
ADLS_ACUITY_SCORE: 43
ADLS_ACUITY_SCORE: 47
ADLS_ACUITY_SCORE: 43
ADLS_ACUITY_SCORE: 47
ADLS_ACUITY_SCORE: 43
ADLS_ACUITY_SCORE: 43

## 2022-09-03 NOTE — PROGRESS NOTES
[facility]    Medicine Progress Note - Hospitalist Service    Date of Admission:  7/27/2022    Assessment & Plan            Julisa Chaney is a 77 year old female admitted on 7/27/2022.  PMH of trigeminal neuralgia who was recently admitted to [facility] on 7/19 for severe sepsis due to COVID-19 infection and suspected bacterial pneumonia. She was treated with 5 days of remdesivir and did not require any steroids, also treated with IV vancomycin and IV meropenem.     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  She was switched to IV vancomycin, cefepime and metronidazole and transferred to [facility] for neurosurgery assessment. MRI brain w/wo contrast 7/30 showed ventriculitis with probable abscess. Neurosurgery/neurology/infectious disease/oncology consulted. Patient taken to the OR on 7/31 for left frontal ventriculostomy.   Since then has remained on broad spectrum antibiotics.  In addition she has developed C. Difficile colitis and is on treatment.  She has continued to have a significant encephalopathy.     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  * flow cytometry did not show any evidence of malignancy.  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 The left temporal horn remains slightly dilated but no samuel hydrocephalus. No acute changes.    -Remains afebrile; leukocytosis resolved ; was initially on vancomycin, cefepime, and metronidazole, then Meropenem and Vancomycin; ID signed off, plan for 6 weeks treatment until 9/8/22  - continue scheduled Tylenol 650 mg TID. PRN ibuprofen ordered  - trying to avoid any narcotics with her confusion/ encephalopathy        Acute infectious encephalopathy due to above:  * mentation much improved since admission ; fluctuating at times ; will not engage in conversation most of the times  * 8/23 discontinued prn benadryl, narcotics; minimize narcotic  - Continue to treat underlying issues as noted above.   - Maintain normal sleep/wake cycle as able.  - use minimal single dosing of narcotic if absolutely needed for pain, no standing order     Suicidal throughs/agitation  Depression  * With improvement in her mentation, she has started becoming at times restless and agitated. On 8/27, reported having suicidal thoughts. No plans/attempts.  * initiated suicide precautions and sitter, evaluated by psychiatry service, discontinued.   - reassess periodically for ongoing need  - anti depressant not recommended currently in the setting of delirium/encephalopathy  - HS seroquel increased 8/31        C. difficile colitis:  Patient was noted to have copious diarrhea on 8/1 and was noted positive for C. Difficile.  * Rectal tube came off 8/28 still with some diarrhea but not severe  -Continue oral vancomycin - till 9/22  - ID has signed off     Acute on chronic anemia:  Prior labs show baseline hemoglobin of about 9-10.   * Hb on admission was 11.7, slowly trended down, got 1 unit PRBC on 8/8/22 for Hb 6.9  * Received IV iron infusion x3 with first on  8/11/22.  -Iron panel on 7/30/22 showed that her total iron was low at 14, binding capacity was low at 28, iron saturation was 6 and B12 was high and folate was normal  * No obvious ongoing bleeding noted; Hb has now remained stable around 9 to 10 on 8/26  -Continue to monitor hgb periodically     History of trigeminal neuralgia  - Continue PTA Trileptal , Keppra and Topamax.     History of chronic pyloric ulcer  She had EGD done in 2020 which showed gastroduodenitis with a duodenal stricture.  - Continue PPI.     Hyperglycemia, resolved  Recent HbA1c was 5.6 on 7/19/22. Was not on any medication prior to admission.  - Continue medium dose sliding scale insulin  - requiring no supplemental insulin and no hypoglycemia, will decrease glucose checks to q8h during the day and at 0200  - decreased lantus to 8 units nightly 9/2      Intermittent Hypokalemia, Hypo/hypernatremia, Hypophosphatemia:  - Replacement per protocol.  - Continue to follow trend     Hypernatremia: resolved.  - Continue free water flushes 200 ml every 4 hrs     Severe protein calorie malnutrition:  - Nutrition following for tube feeds via G tube (placed 8/16) ; getting free water flushes  - Will need SLP consult when more able to participate     Physical deconditioning:  - will need TCU placement; declined by LTACH; continue therapy as able to participate  - SW following for disposition         Diet: NPO for Medical/Clinical Reasons Except for: Other; Specify: NPO For oral intake and gastric feedings for 6 hours post insertion Gastrostomy G/GJ tube. May feed through Jejunal or Distal PORT immediately for GJ tubes ONLY.  Adult Formula Drip Feeding: Continuous Vital 1.5; Gastrostomy; Goal Rate: 50; mL/hr; Medication - Feeding Tube Flush Frequency: At least 15-30 mL water before and after medication administration and with tube clogging; Amount to Send (Nutrition us...    DVT Prophylaxis: Pneumatic Compression Devices  Abrams Catheter: Not  present  Central Lines: PRESENT  PICC Triple Lumen 07/22/22 Right Basilic Vascular access-Site Assessment: WDL  Cardiac Monitoring: None  Code Status: Full Code      Disposition Plan      Expected Discharge Date: 09/06/2022    Discharge Delays: Insurance Authorization needed  Placement - TCU  Destination: inpatient rehabilitation facility  Discharge Comments: TCU pending, they may decline. Difficult placement        The patient's care was discussed with the Bedside Nurse and Patient.  Attempted to contact daughter but no answer, left voicemail.     Sekou Yeboah MD  Hospitalist Service  [facility]  Securely message with the Vocera Web Console (learn more here)  Text page via [employer] Paging/Directory         Clinically Significant Risk Factors Present on Admission                      ______________________________________________________________________    Interval History   Complains of low back pain and some left lower quadrant pain.  Ongoing diarrhea.  No fever/chills.      Data reviewed today: I reviewed all medications, new labs and imaging results over the last 24 hours. I personally reviewed no images or EKG's today.    Physical Exam   Vital Signs: Temp: 98.2  F (36.8  C) Temp src: Axillary BP: 131/68 Pulse: 85   Resp: 16 SpO2: 99 % O2 Device: None (Room air)    Weight: 105 lbs 13.13 oz     General Appearance: thin female in NAD, lying in bed  Respiratory: few basilar crackles, no wheezing or tachypnea   Cardiovascular: RRR, normal s1/s2 without murmur  GI: abdomen soft, normal bowel sounds, reports diffuse tenderness to palpation  Skin: no peripheral edema   Other: Alert and appropriate, cranial nerves grossly intact     Data   Recent Labs   Lab 09/03/22  1219 09/03/22  0823 09/03/22  0529 09/03/22  0423 09/02/22  1719 09/02/22  1253 08/31/22  1328 08/31/22  0630 08/29/22  0813 08/29/22  0610 08/28/22  0723 08/28/22  0545   NA  --   --   --   --   --   --   --  142  --  138  --   --     POTASSIUM  --   --  3.9  --   --  3.8  --  3.8  --  4.0  --  3.9   CHLORIDE  --   --   --   --   --   --   --  110*  --  107  --   --    CO2  --   --   --   --   --   --   --  28  --  27  --   --    BUN  --   --   --   --   --   --   --  18  --  14  --   --    CR  --   --   --   --   --   --   --  0.43*  --  0.41*  --  0.39*   ANIONGAP  --   --   --   --   --   --   --  4  --  4  --   --    ADY  --   --   --   --   --   --   --  9.2  --  8.9  --   --    * 89  --  109*   < >  --    < > 115*   < > 104*   < >  --     < > = values in this interval not displayed.

## 2022-09-03 NOTE — PLAN OF CARE
"Reason for Admission: L brain abscess, sepsis, encephalopathy  Cognitive/Mentation: A/Ox 2, disoriented to time and situation.   Neuros/CMS: Stable with generalized weakness. Following minimal commands. Spontaneously moving all extremities.   VS: VSS on RA  Tele: N/A  GI: BS audible, + flatus, last BM 9/3/22. Incontinent.  : Voiding adequately, refusing Purewick. Incontinent.  Pulmonary: LS diminished  Pain: HA and body pain, prn Tylenol and Ibuprofen given.   Drains/Lines: R triple lumen picc. PEG tube infusing TF.   Skin: Intact, redness in perineum area- skin barrier applied. Mepilex in place.   Activity: Assist x 2 with lift device.  Diet: NPO diet with tube feedings running at goal of 50 mL/hr with q4hr 200 mL flushes. Takes pills crushed in peg tube.    Therapies recs: TCU  Discharge: Pending  Aggression Stoplight Tool: Green  End of shift summary: No changes this shift. MRSA/C-diff precautions maintained. Pt stating \"I want to fall asleep and never wake up\" once during shift. Started phosphorus replacement this morning. Next redraw tomorrow morning.  "

## 2022-09-03 NOTE — PROGRESS NOTES
Pt here with a left brain abscess, sepsis, and encephalopathy. A&O to self only and did remember that she was in a hospital this afternoon. Neuros difficult to assess due to Pt not following commands and participating in exam. VSS. NPO diet with TF running at goal of 50mL/hr and 200 Q4 flush programed into pump. Takes pills crushed in Peg tube. Pt continues to have loose incontinent stools- RN applied skin barrier cream. Up with Ax2 lift but does turn well in bed. HA and generalized pain managed with Tylenol X1. Pt scoring green on the Aggression Stop Light Tool. Plan to continue replacing phosphorus with a redraw tomorrow morning 9/4. Discharge pending.

## 2022-09-04 LAB
GLUCOSE BLD-MCNC: 107 MG/DL (ref 70–99)
GLUCOSE BLDC GLUCOMTR-MCNC: 108 MG/DL (ref 70–99)
GLUCOSE BLDC GLUCOMTR-MCNC: 114 MG/DL (ref 70–99)
GLUCOSE BLDC GLUCOMTR-MCNC: 115 MG/DL (ref 70–99)
GLUCOSE BLDC GLUCOMTR-MCNC: 137 MG/DL (ref 70–99)
PHOSPHATE SERPL-MCNC: 2.6 MG/DL (ref 2.5–4.5)
VANCOMYCIN SERPL-MCNC: 22 MG/L

## 2022-09-04 PROCEDURE — 84100 ASSAY OF PHOSPHORUS: CPT | Performed by: HOSPITALIST

## 2022-09-04 PROCEDURE — 250N000013 HC RX MED GY IP 250 OP 250 PS 637

## 2022-09-04 PROCEDURE — 999N000190 HC STATISTIC VAT ROUNDS

## 2022-09-04 PROCEDURE — 99232 SBSQ HOSP IP/OBS MODERATE 35: CPT | Performed by: HOSPITALIST

## 2022-09-04 PROCEDURE — 250N000013 HC RX MED GY IP 250 OP 250 PS 637: Performed by: HOSPITALIST

## 2022-09-04 PROCEDURE — 82947 ASSAY GLUCOSE BLOOD QUANT: CPT | Performed by: HOSPITALIST

## 2022-09-04 PROCEDURE — 258N000003 HC RX IP 258 OP 636: Performed by: HOSPITALIST

## 2022-09-04 PROCEDURE — 250N000011 HC RX IP 250 OP 636: Performed by: HOSPITALIST

## 2022-09-04 PROCEDURE — 80202 ASSAY OF VANCOMYCIN: CPT | Performed by: INTERNAL MEDICINE

## 2022-09-04 PROCEDURE — 120N000001 HC R&B MED SURG/OB

## 2022-09-04 PROCEDURE — 250N000011 HC RX IP 250 OP 636: Performed by: INTERNAL MEDICINE

## 2022-09-04 PROCEDURE — 250N000013 HC RX MED GY IP 250 OP 250 PS 637: Performed by: INTERNAL MEDICINE

## 2022-09-04 RX ORDER — VANCOMYCIN HYDROCHLORIDE 1 G/200ML
1000 INJECTION, SOLUTION INTRAVENOUS
Status: DISCONTINUED | OUTPATIENT
Start: 2022-09-04 | End: 2022-09-07

## 2022-09-04 RX ADMIN — Medication 1 PACKET: at 08:53

## 2022-09-04 RX ADMIN — LEVETIRACETAM 1000 MG: 100 SOLUTION ORAL at 20:49

## 2022-09-04 RX ADMIN — MEROPENEM 2 G: 1 INJECTION, POWDER, FOR SOLUTION INTRAVENOUS at 04:45

## 2022-09-04 RX ADMIN — MICONAZOLE NITRATE: 20 CREAM TOPICAL at 08:53

## 2022-09-04 RX ADMIN — VANCOMYCIN HYDROCHLORIDE 125 MG: KIT at 08:56

## 2022-09-04 RX ADMIN — OXCARBAZEPINE 450 MG: 300 SUSPENSION ORAL at 21:10

## 2022-09-04 RX ADMIN — TOPIRAMATE 50 MG: 50 TABLET ORAL at 21:10

## 2022-09-04 RX ADMIN — Medication 1 PACKET: at 11:42

## 2022-09-04 RX ADMIN — MEROPENEM 2 G: 1 INJECTION, POWDER, FOR SOLUTION INTRAVENOUS at 11:42

## 2022-09-04 RX ADMIN — ACETAMINOPHEN 650 MG: 325 SUSPENSION ORAL at 18:39

## 2022-09-04 RX ADMIN — VANCOMYCIN HYDROCHLORIDE 125 MG: KIT at 17:55

## 2022-09-04 RX ADMIN — MEROPENEM 2 G: 1 INJECTION, POWDER, FOR SOLUTION INTRAVENOUS at 20:49

## 2022-09-04 RX ADMIN — ACETAMINOPHEN 650 MG: 325 SUSPENSION ORAL at 01:58

## 2022-09-04 RX ADMIN — INSULIN GLARGINE 8 UNITS: 100 INJECTION, SOLUTION SUBCUTANEOUS at 21:10

## 2022-09-04 RX ADMIN — IBUPROFEN 400 MG: 200 TABLET, FILM COATED ORAL at 21:10

## 2022-09-04 RX ADMIN — Medication 1 PACKET: at 17:55

## 2022-09-04 RX ADMIN — ACETAMINOPHEN 650 MG: 325 SUSPENSION ORAL at 08:56

## 2022-09-04 RX ADMIN — VANCOMYCIN HYDROCHLORIDE 125 MG: KIT at 21:10

## 2022-09-04 RX ADMIN — LEVETIRACETAM 1000 MG: 100 SOLUTION ORAL at 08:57

## 2022-09-04 RX ADMIN — QUETIAPINE FUMARATE 50 MG: 50 TABLET ORAL at 21:10

## 2022-09-04 RX ADMIN — Medication 15 ML: at 08:56

## 2022-09-04 RX ADMIN — MICONAZOLE NITRATE: 20 CREAM TOPICAL at 21:33

## 2022-09-04 RX ADMIN — VANCOMYCIN HYDROCHLORIDE 125 MG: KIT at 13:56

## 2022-09-04 RX ADMIN — ACETAMINOPHEN 650 MG: 325 SUSPENSION ORAL at 13:56

## 2022-09-04 RX ADMIN — VANCOMYCIN HYDROCHLORIDE 1000 MG: 1 INJECTION, SOLUTION INTRAVENOUS at 14:51

## 2022-09-04 RX ADMIN — Medication 40 MG: at 08:56

## 2022-09-04 ASSESSMENT — ACTIVITIES OF DAILY LIVING (ADL)
ADLS_ACUITY_SCORE: 45
ADLS_ACUITY_SCORE: 45
ADLS_ACUITY_SCORE: 43
ADLS_ACUITY_SCORE: 45
ADLS_ACUITY_SCORE: 44
ADLS_ACUITY_SCORE: 45
ADLS_ACUITY_SCORE: 45
ADLS_ACUITY_SCORE: 44

## 2022-09-04 NOTE — PHARMACY-VANCOMYCIN DOSING SERVICE
Pharmacy Vancomycin Note  Date of Service 2022  Patient's  1945   77 year old, female    Indication: Meningitis  Day of Therapy: 46  Current vancomycin regimen:  1500 mg q 24 hours  Current vancomycin monitoring method: Trough (Method 1 = dosing nomogram)  Current vancomycin therapeutic monitoring goal: 15-20 mg/L    [employer] Prediction of Current Vancomycin Regimen  Loading dose: N/A  Regimen: 1500 mg IV every 24 hours.  Start time: 14:25 on 2022  Exposure target: [id number] mg/L   AUC24,ss: 607 mg/L.hr  Probability of AUC24 > 400: 100 %  Ctrough,ss: 17.3 mg/L  Probability of Ctrough,ss > 20: 15 %  Probability of nephrotoxicity (Lodise ESTIVEN ): 13 %      Current estimated CrCl = Estimated Creatinine Clearance: 87.5 mL/min (A) (based on SCr of 0.43 mg/dL (L)).    Creatinine for last 3 days  No results found for requested labs within last 72 hours.    Recent Vancomycin Levels (past 3 days)  2022: 12:22 PM Vancomycin 22.0 mg/L    Vancomycin IV Administrations (past 72 hours)                   vancomycin 1500 mg in 0.9% NaCl 250 ml intermittent infusion 1,500 mg (mg) 1,500 mg New Bag 22 1345     1,500 mg New Bag 22 1341                Nephrotoxins and other renal medications (From now, onward)    Start     Dose/Rate Route Frequency Ordered Stop    22 1430  vancomycin (VANCOCIN) 1000 mg in dextrose 5% 200 mL PREMIX         1,000 mg  200 mL/hr over 1 Hours Intravenous EVERY 18 HOURS 22 1330      22 1050  ibuprofen (ADVIL/MOTRIN) tablet 400 mg         400 mg Oral EVERY 6 HOURS PRN 22 1051      08/15/22 0000  vancomycin        Note to Pharmacy: Check twice weekly creatinine and vancomycin levels. Dosing per Pharm D based on AUC       08/15/22 1505 22 2359    22 0900  vancomycin (FIRVANQ) oral solution 125 mg         125 mg Oral or Feeding Tube 4 TIMES DAILY 22 0819               Contrast Orders - past 72 hours (72h ago, onward)     None          Interpretation of levels and current regimen:  Vancomycin level is reflective of supratherapeutic level    Renal Function: Stable    [employer] Prediction of Planned New Vancomycin Regimen  Loading dose: N/A  Regimen: 1000 mg IV every 18 hours.  Start time: 14:25 on 09/04/2022  Exposure target: [id number] mg/L   AUC24,ss: 541 mg/L.hr  Probability of AUC24 > 400: 99 %  Ctrough,ss: 16.8 mg/L  Probability of Ctrough,ss > 20: 9 %  Probability of nephrotoxicity (Lodise ESTIVEN 2009): 12 %      Plan:  1. Decrease Dose to 1000 mg q 18 hours  2. Vancomycin monitoring method: Trough (Method 1 = dosing nomogram)  3. Vancomycin therapeutic monitoring goal: 15-20 mg/L  4. Pharmacy will check vancomycin levels as appropriate.  5. Serum creatinine levels will be ordered a minimum of twice weekly.    Yue Wilson RPH

## 2022-09-04 NOTE — PLAN OF CARE
Reason for Admission: Cerebral abscess    Cognitive/Mentation: A/Ox 2, dis time and situation  Neuros/CMS: Generalized weakness, patient refusing to participate in most of the assessment  VS: Stable. Tele: NSR.  GI: BS +, + flatus, last BM today. Incontinent.   : Incontinent.  Pulmonary: LS diminished.  Pain: 4/10 pain, tylenol given.     Drains: PEG tube  Skin: Scattered bruising, rash to perineum  Activity: Assist x 2 with lift.  Diet: NPO, Tube feed running at goal. Pills through PEG tube.     Aggression Stoplight Score: Green  Therapies recs: TCU, LTACH declined  Discharge: Pending placement    End of shift summary: Very frequent bowel movements, frequent incontinence care, barrier cream applied to perineum. Q2 turns and repositioning. No suicidal remarks made.

## 2022-09-04 NOTE — PROGRESS NOTES
[facility]    Medicine Progress Note - Hospitalist Service    Date of Admission:  7/27/2022    Assessment & Plan            Julisa Chaney is a 77 year old female admitted on 7/27/2022.  PMH of trigeminal neuralgia who was recently admitted to [facility] on 7/19 for severe sepsis due to COVID-19 infection and suspected bacterial pneumonia. She was treated with 5 days of remdesivir and did not require any steroids, also treated with IV vancomycin and IV meropenem.     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  She was switched to IV vancomycin, cefepime and metronidazole and transferred to [facility] for neurosurgery assessment. MRI brain w/wo contrast 7/30 showed ventriculitis with probable abscess. Neurosurgery/neurology/infectious disease/oncology consulted. Patient taken to the OR on 7/31 for left frontal ventriculostomy.   Since then has remained on broad spectrum antibiotics.  In addition she has developed C. Difficile colitis and is on treatment.  She has continued to have a significant encephalopathy.     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  * flow cytometry did not show any evidence of malignancy.  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 The left temporal horn remains slightly dilated but no samuel hydrocephalus. No acute changes.    -Remains afebrile; leukocytosis resolved ; was initially on vancomycin, cefepime, and metronidazole, then Meropenem and Vancomycin; ID signed off, plan for 6 weeks treatment until 9/8/22  - continue scheduled Tylenol 650 mg TID. PRN ibuprofen ordered  - trying to avoid any narcotics with her confusion/ encephalopathy   - does not appear therapies were re-consulted from earlier in stay; will re-order PT/OT as well as SLP given improvement in mentation       Acute infectious encephalopathy due to above:  * mentation much improved since admission ; fluctuating at times ; will not engage in conversation most of the times  * 8/23 discontinued prn benadryl, narcotics; minimize narcotic  - Continue to treat underlying issues as noted above.   - Maintain normal sleep/wake cycle as able.  - use minimal single dosing of narcotic if absolutely needed for pain, no standing order     Suicidal throughs/agitation  Depression  * With improvement in her mentation, she has started becoming at times restless and agitated. On 8/27, reported having suicidal thoughts. No plans/attempts.  * initiated suicide precautions and sitter, evaluated by psychiatry service, discontinued.   - reassess periodically for ongoing need  - anti depressant not recommended currently in the setting of delirium/encephalopathy  - HS seroquel increased 8/31        C. difficile colitis:  Patient was noted to have copious diarrhea on 8/1 and was noted positive for C. Difficile.  * Rectal tube came off 8/28 still with some diarrhea but not severe  -Continue oral vancomycin - till 9/22  - ID has signed off     Acute on chronic anemia:  Prior labs show baseline hemoglobin of about 9-10.    * Hb on admission was 11.7, slowly trended down, got 1 unit PRBC on 8/8/22 for Hb 6.9  * Received IV iron infusion x3 with first on 8/11/22.  -Iron panel on 7/30/22 showed that her total iron was low at 14, binding capacity was low at 28, iron saturation was 6 and B12 was high and folate was normal  * No obvious ongoing bleeding noted; Hb has now remained stable around 9 to 10 on 8/26  -Continue to monitor hgb periodically     History of trigeminal neuralgia  - Continue PTA Trileptal , Keppra and Topamax.     History of chronic pyloric ulcer  She had EGD done in 2020 which showed gastroduodenitis with a duodenal stricture.  - Continue PPI.     Hyperglycemia, resolved  Recent HbA1c was 5.6 on 7/19/22. Was not on any medication prior to admission.  - Continue medium dose sliding scale insulin  - requiring no supplemental insulin and no hypoglycemia, glucose checks q8h during the day and at 0200  - decreased lantus to 8 units nightly 9/2      Intermittent Hypokalemia, Hypo/hypernatremia, Hypophosphatemia:  - Replacement per protocol.     Hypernatremia: resolved.  - Continue free water flushes 200 ml every 4 hrs     Severe protein calorie malnutrition:  - Nutrition following for tube feeds via G tube (placed 8/16) ; getting free water flushes     Physical deconditioning:  - will need TCU placement; declined by LTACH; continue therapy as able to participate  - SW following for disposition         Diet: NPO for Medical/Clinical Reasons Except for: Other; Specify: NPO For oral intake and gastric feedings for 6 hours post insertion Gastrostomy G/GJ tube. May feed through Jejunal or Distal PORT immediately for GJ tubes ONLY.  Adult Formula Drip Feeding: Continuous Vital 1.5; Gastrostomy; Goal Rate: 50; mL/hr; Medication - Feeding Tube Flush Frequency: At least 15-30 mL water before and after medication administration and with tube clogging; Amount to Send (Nutrition us...    DVT Prophylaxis: Pneumatic Compression  Devices  Abrams Catheter: Not present  Central Lines: PRESENT  PICC Triple Lumen 07/22/22 Right Basilic Vascular access-Site Assessment: WDL  Cardiac Monitoring: None  Code Status: Full Code      Disposition Plan      Expected Discharge Date: 09/06/2022    Discharge Delays: Insurance Authorization needed  Placement - TCU  Destination: inpatient rehabilitation facility  Discharge Comments: TCU pending, they may decline. Difficult placement        The patient's care was discussed with the Bedside Nurse, Patient and Patient's Family.      Sekou Yeboah MD  Hospitalist Service  [facility]  Securely message with the Vocera Web Console (learn more here)  Text page via [employer] Paging/Directory         Clinically Significant Risk Factors Present on Admission                      ______________________________________________________________________    Interval History   She complains of headache.  Complains of pain at PEG tube site.  No light or sound sensitivity.  No fever/chills, dyspnea, chest pain/pressure.       Data reviewed today: I reviewed all medications, new labs and imaging results over the last 24 hours. I personally reviewed no images or EKG's today.    Physical Exam   Vital Signs: Temp: 98.6  F (37  C) Temp src: Oral BP: 111/76 Pulse: 85   Resp: 18 SpO2: 95 % O2 Device: None (Room air)    Weight: 111 lbs 8.84 oz     General Appearance: thin female in NAD, lying in bed  Respiratory: lungs CTAB, no wheezing or tachypnea   Cardiovascular: RRR, normal s1/s2 without murmur  GI: abdomen soft, normal bowel sounds, tenderness surrounding PEG tube  Skin: no peripheral edema, no erythema surrounding PEG  Other: Alert, oriented, person, hospital, year and situation, cranial nerves grossly intact     Data   Recent Labs   Lab 09/04/22  1215 09/04/22  0826 09/04/22  0618 09/03/22  0823 09/03/22  0529 09/02/22  1719 09/02/22  1253 08/31/22  1328 08/31/22  0630 08/29/22  0813 08/29/22  0610   NA  --    --   --   --   --   --   --   --  142  --  138   POTASSIUM  --   --   --   --  3.9  --  3.8  --  3.8  --  4.0   CHLORIDE  --   --   --   --   --   --   --   --  110*  --  107   CO2  --   --   --   --   --   --   --   --  28  --  27   BUN  --   --   --   --   --   --   --   --  18  --  14   CR  --   --   --   --   --   --   --   --  0.43*  --  0.41*   ANIONGAP  --   --   --   --   --   --   --   --  4  --  4   ADY  --   --   --   --   --   --   --   --  9.2  --  8.9   * 115* 107*   < >  --    < >  --    < > 115*   < > 104*    < > = values in this interval not displayed.

## 2022-09-04 NOTE — PROGRESS NOTES
Care Management Follow Up    Length of Stay (days): 39    Expected Discharge Date: 09/06/2022     Concerns to be Addressed:       Patient plan of care discussed at interdisciplinary rounds: Yes    Anticipated Discharge Disposition: TCU     Anticipated Discharge Services:    Anticipated Discharge DME:      Patient/family educated on Medicare website which has current facility and service quality ratings:    Education Provided on the Discharge Plan:    Patient/Family in Agreement with the Plan: yes    Referrals Placed by CM/SW: [place], [facility], [place], [facility], [facility], [facility] and [place]   Private pay costs discussed: Not applicable    Additional Information:   [facility] and [facility] declined patient.       WILEY Rudd

## 2022-09-04 NOTE — PLAN OF CARE
Goal Outcome Evaluation:    A/Ox2. Disoriented to time/situation. VSS on RA. Abdominal pain/gen weakness- PRN tylenol. NPO. Peg tube infusing tube feeding continuously @ 50 mL/hr. Declined most of neuro exam.  Incontinent of B/B. Reddened buttock and khushbu area- barrier cream in room. Turn/repo Q2. Right arm triple lumen picc. Pending TCU.

## 2022-09-04 NOTE — PLAN OF CARE
VSS. Alert, disoriented to time and situation. Speech slow at times. Flat affect. Generalized weakness. Declined neuro exam. Purposefully moving all extremities. Denies pain. NPO. TF at goal rate via PEG tube. Incontinent of urine and stool. Gwendolyn area skin very red, barrier cream applied. Repositioned with assist of 2. Right arm PICC.

## 2022-09-05 ENCOUNTER — APPOINTMENT (OUTPATIENT)
Dept: SPEECH THERAPY | Facility: CLINIC | Age: 77
DRG: 023 | End: 2022-09-05
Attending: HOSPITALIST
Payer: COMMERCIAL

## 2022-09-05 LAB
ANION GAP SERPL CALCULATED.3IONS-SCNC: 3 MMOL/L (ref 3–14)
BUN SERPL-MCNC: 15 MG/DL (ref 7–30)
CALCIUM SERPL-MCNC: 9 MG/DL (ref 8.5–10.1)
CHLORIDE BLD-SCNC: 107 MMOL/L (ref 94–109)
CO2 SERPL-SCNC: 28 MMOL/L (ref 20–32)
CREAT SERPL-MCNC: 0.44 MG/DL (ref 0.52–1.04)
ERYTHROCYTE [DISTWIDTH] IN BLOOD BY AUTOMATED COUNT: 23.2 % (ref 10–15)
GFR SERPL CREATININE-BSD FRML MDRD: >90 ML/MIN/1.73M2
GLUCOSE BLD-MCNC: 117 MG/DL (ref 70–99)
GLUCOSE BLDC GLUCOMTR-MCNC: 101 MG/DL (ref 70–99)
GLUCOSE BLDC GLUCOMTR-MCNC: 116 MG/DL (ref 70–99)
GLUCOSE BLDC GLUCOMTR-MCNC: 122 MG/DL (ref 70–99)
GLUCOSE BLDC GLUCOMTR-MCNC: 127 MG/DL (ref 70–99)
HCT VFR BLD AUTO: 35.5 % (ref 35–47)
HGB BLD-MCNC: 10.5 G/DL (ref 11.7–15.7)
MAGNESIUM SERPL-MCNC: 2.2 MG/DL (ref 1.6–2.3)
MCH RBC QN AUTO: 25.2 PG (ref 26.5–33)
MCHC RBC AUTO-ENTMCNC: 29.6 G/DL (ref 31.5–36.5)
MCV RBC AUTO: 85 FL (ref 78–100)
PHOSPHATE SERPL-MCNC: 2.5 MG/DL (ref 2.5–4.5)
PLATELET # BLD AUTO: 353 10E3/UL (ref 150–450)
POTASSIUM BLD-SCNC: 3.6 MMOL/L (ref 3.4–5.3)
RBC # BLD AUTO: 4.17 10E6/UL (ref 3.8–5.2)
SODIUM SERPL-SCNC: 138 MMOL/L (ref 133–144)
WBC # BLD AUTO: 10.8 10E3/UL (ref 4–11)

## 2022-09-05 PROCEDURE — 258N000003 HC RX IP 258 OP 636: Performed by: HOSPITALIST

## 2022-09-05 PROCEDURE — 83735 ASSAY OF MAGNESIUM: CPT | Performed by: HOSPITALIST

## 2022-09-05 PROCEDURE — 85027 COMPLETE CBC AUTOMATED: CPT | Performed by: HOSPITALIST

## 2022-09-05 PROCEDURE — 250N000013 HC RX MED GY IP 250 OP 250 PS 637: Performed by: HOSPITALIST

## 2022-09-05 PROCEDURE — 84100 ASSAY OF PHOSPHORUS: CPT | Performed by: HOSPITALIST

## 2022-09-05 PROCEDURE — 99232 SBSQ HOSP IP/OBS MODERATE 35: CPT | Performed by: HOSPITALIST

## 2022-09-05 PROCEDURE — 92526 ORAL FUNCTION THERAPY: CPT | Mod: GN | Performed by: SPEECH-LANGUAGE PATHOLOGIST

## 2022-09-05 PROCEDURE — 250N000011 HC RX IP 250 OP 636: Performed by: INTERNAL MEDICINE

## 2022-09-05 PROCEDURE — 80048 BASIC METABOLIC PNL TOTAL CA: CPT | Performed by: HOSPITALIST

## 2022-09-05 PROCEDURE — 250N000011 HC RX IP 250 OP 636: Performed by: HOSPITALIST

## 2022-09-05 PROCEDURE — 120N000001 HC R&B MED SURG/OB

## 2022-09-05 PROCEDURE — 92610 EVALUATE SWALLOWING FUNCTION: CPT | Mod: GN | Performed by: SPEECH-LANGUAGE PATHOLOGIST

## 2022-09-05 PROCEDURE — 250N000013 HC RX MED GY IP 250 OP 250 PS 637

## 2022-09-05 PROCEDURE — 250N000013 HC RX MED GY IP 250 OP 250 PS 637: Performed by: INTERNAL MEDICINE

## 2022-09-05 PROCEDURE — 999N000190 HC STATISTIC VAT ROUNDS

## 2022-09-05 RX ADMIN — MEROPENEM 2 G: 1 INJECTION, POWDER, FOR SOLUTION INTRAVENOUS at 04:16

## 2022-09-05 RX ADMIN — Medication 1 PACKET: at 17:18

## 2022-09-05 RX ADMIN — LEVETIRACETAM 1000 MG: 100 SOLUTION ORAL at 20:23

## 2022-09-05 RX ADMIN — INSULIN GLARGINE 8 UNITS: 100 INJECTION, SOLUTION SUBCUTANEOUS at 21:03

## 2022-09-05 RX ADMIN — LEVETIRACETAM 1000 MG: 100 SOLUTION ORAL at 08:31

## 2022-09-05 RX ADMIN — Medication 1 PACKET: at 11:49

## 2022-09-05 RX ADMIN — VANCOMYCIN HYDROCHLORIDE 125 MG: KIT at 08:31

## 2022-09-05 RX ADMIN — ACETAMINOPHEN 650 MG: 325 SUSPENSION ORAL at 20:22

## 2022-09-05 RX ADMIN — MICONAZOLE NITRATE: 20 CREAM TOPICAL at 08:31

## 2022-09-05 RX ADMIN — MEROPENEM 2 G: 1 INJECTION, POWDER, FOR SOLUTION INTRAVENOUS at 11:49

## 2022-09-05 RX ADMIN — ACETAMINOPHEN 650 MG: 325 SUSPENSION ORAL at 00:06

## 2022-09-05 RX ADMIN — VANCOMYCIN HYDROCHLORIDE 125 MG: KIT at 21:03

## 2022-09-05 RX ADMIN — IBUPROFEN 400 MG: 200 TABLET, FILM COATED ORAL at 04:20

## 2022-09-05 RX ADMIN — Medication 40 MG: at 08:31

## 2022-09-05 RX ADMIN — ACETAMINOPHEN 650 MG: 325 SUSPENSION ORAL at 11:49

## 2022-09-05 RX ADMIN — VANCOMYCIN HYDROCHLORIDE 1000 MG: 1 INJECTION, SOLUTION INTRAVENOUS at 08:31

## 2022-09-05 RX ADMIN — OXCARBAZEPINE 450 MG: 300 SUSPENSION ORAL at 21:03

## 2022-09-05 RX ADMIN — QUETIAPINE FUMARATE 50 MG: 50 TABLET ORAL at 21:03

## 2022-09-05 RX ADMIN — Medication 15 ML: at 08:31

## 2022-09-05 RX ADMIN — TOPIRAMATE 50 MG: 50 TABLET ORAL at 21:03

## 2022-09-05 RX ADMIN — VANCOMYCIN HYDROCHLORIDE 125 MG: KIT at 17:18

## 2022-09-05 RX ADMIN — MICONAZOLE NITRATE: 20 CREAM TOPICAL at 20:28

## 2022-09-05 RX ADMIN — MEROPENEM 2 G: 1 INJECTION, POWDER, FOR SOLUTION INTRAVENOUS at 20:22

## 2022-09-05 RX ADMIN — VANCOMYCIN HYDROCHLORIDE 125 MG: KIT at 12:00

## 2022-09-05 RX ADMIN — ACETAMINOPHEN 650 MG: 325 SUSPENSION ORAL at 16:19

## 2022-09-05 RX ADMIN — Medication 1 PACKET: at 08:31

## 2022-09-05 ASSESSMENT — ACTIVITIES OF DAILY LIVING (ADL)
ADLS_ACUITY_SCORE: 45
ADLS_ACUITY_SCORE: 49
ADLS_ACUITY_SCORE: 45

## 2022-09-05 NOTE — PLAN OF CARE
Reason for Admission: L brain abscess, sepsis, encephalopathy  Cognitive/Mentation: A/Ox 2, disoriented to time and situation.   Neuros/CMS: Stable with generalized weakness. Following minimal commands. Spontaneously moving all extremities.   VS: VSS on RA  Tele: NSR  GI: BS audible, + flatus, last BM 9/5/22. Incontinent.  : Voiding adequately, refusing Purewick. Incontinent.  Pulmonary: LS diminished  Pain: HA and body pain, prn Tylenol and Ibuprofen given.   Drains/Lines: R triple lumen picc. PEG tube infusing TF.   Skin: Intact, redness in perineum area- skin barrier applied. Mepilex in place.   Activity: Assist x 2 with lift device.  Diet: NPO diet with tube feedings running at goal of 50 mL/hr with q4hr 200 mL flushes. Takes pills crushed in peg tube.    Therapies recs: TCU  Discharge: Pending  Aggression Stoplight Tool: Green  End of shift summary: No changes this shift. MRSA/C-diff precautions maintained.

## 2022-09-05 NOTE — PROGRESS NOTES
[facility]    Medicine Progress Note - Hospitalist Service    Date of Admission:  7/27/2022    Assessment & Plan            Julisa Chaney is a 77 year old female admitted on 7/27/2022.  PMH of trigeminal neuralgia who was recently admitted to [facility] on 7/19 for severe sepsis due to COVID-19 infection and suspected bacterial pneumonia. She was treated with 5 days of remdesivir and did not require any steroids, also treated with IV vancomycin and IV meropenem.     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  She was switched to IV vancomycin, cefepime and metronidazole and transferred to [facility] for neurosurgery assessment. MRI brain w/wo contrast 7/30 showed ventriculitis with probable abscess. Neurosurgery/neurology/infectious disease/oncology consulted. Patient taken to the OR on 7/31 for left frontal ventriculostomy.   Since then has remained on broad spectrum antibiotics.  In addition she has developed C. Difficile colitis and is on treatment.  She has continued to have a significant encephalopathy which is very slowly improving.     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  * flow cytometry did not show any evidence of malignancy.  * s/p Left frontal ventriculostomy on 7/31/2022, EVD removed 8/8; CSF cultures have remained negative; CSF counts decreasing 469---233 ---56 (last CSF from 8/18)  * Head CT from 8/19 with stable presumed vasogenic edema in the white matter of the anterior inferior left frontal lobe related to ventriculitis of the left lateral ventricle; stable  ventriculomegaly of the left lateral ventricle   * Has been having persistent headaches; reevaluated by neurosurgery 8/27 and recommended head CT, done on 8/29-->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 The left temporal horn remains slightly dilated but no samuel hydrocephalus. No acute changes.    -Remains afebrile; leukocytosis resolved ; was initially on vancomycin, cefepime, and metronidazole, then Meropenem and Vancomycin; ID signed off, plan for 6 weeks treatment until 9/8/22  - continue scheduled Tylenol 650 mg TID. PRN ibuprofen ordered  - trying to avoid any narcotics with her confusion/ encephalopathy   - does not appear therapies were re-consulted from earlier in stay; will re-order PT/OT        Acute infectious encephalopathy due to above:  * mentation much improved since admission ; fluctuating at times ; will not engage in conversation most of the times  * 8/23 discontinued prn benadryl, narcotics; minimize narcotic  - Continue to treat underlying issues as noted above.   - Maintain normal sleep/wake cycle as able.  - use minimal single dosing of narcotic if absolutely needed for pain, no standing order     Suicidal throughs/agitation  Depression  * With improvement in her mentation, she has started becoming at times restless and agitated. On 8/27, reported having suicidal thoughts. No plans/attempts.  * initiated suicide precautions and sitter, evaluated by psychiatry service, discontinued.   - reassess periodically for ongoing need  - anti depressant not recommended currently in the setting of delirium/encephalopathy  - HS seroquel increased 8/31        C. difficile colitis:  Patient was noted to have copious diarrhea on 8/1 and was noted positive for C. Difficile.  * Rectal tube came off 8/28 still with some diarrhea but not severe  -Continue oral vancomycin - till 9/22  - ID has signed off     Acute on chronic anemia:  Prior labs show baseline hemoglobin of about 9-10.   * Hb on  admission was 11.7, slowly trended down, got 1 unit PRBC on 8/8/22 for Hb 6.9  * Received IV iron infusion x3 with first on 8/11/22.  -Iron panel on 7/30/22 showed that her total iron was low at 14, binding capacity was low at 28, iron saturation was 6 and B12 was high and folate was normal  * No obvious ongoing bleeding noted; Hb has now remained stable around 9 to 10 on 8/26  -Continue to monitor hgb periodically     History of trigeminal neuralgia  - Continue PTA Trileptal , Keppra and Topamax.     History of chronic pyloric ulcer  She had EGD done in 2020 which showed gastroduodenitis with a duodenal stricture.  - Continue PPI.     Hyperglycemia, resolved  Recent HbA1c was 5.6 on 7/19/22. Was not on any medication prior to admission.  - Continue medium dose sliding scale insulin  - requiring no supplemental insulin and no hypoglycemia, glucose checks q8h during the day and at 0200  - decreased lantus to 8 units nightly 9/2      Intermittent Hypokalemia, Hypo/hypernatremia, Hypophosphatemia:  - Replacement per protocol.     Hypernatremia: resolved.  - Continue free water flushes 200 ml every 4 hrs     Severe dysphagia  Severe protein calorie malnutrition:  - Nutrition following for tube feeds via G tube (placed 8/16) ; getting free water flushes  - limited re-evaluation 9/5 by SLP; recommend continued NPO status     Physical deconditioning:  - will need TCU placement; declined by LTACH; continue therapy as able to participate  - SW following for disposition         Diet: NPO for Medical/Clinical Reasons Except for: Other; Specify: NPO For oral intake and gastric feedings for 6 hours post insertion Gastrostomy G/GJ tube. May feed through Jejunal or Distal PORT immediately for GJ tubes ONLY.  Adult Formula Drip Feeding: Continuous Vital 1.5; Gastrostomy; Goal Rate: 50; mL/hr; Medication - Feeding Tube Flush Frequency: At least 15-30 mL water before and after medication administration and with tube clogging; Amount  to Send (Nutrition us...    DVT Prophylaxis: Pneumatic Compression Devices  Abrams Catheter: Not present  Central Lines: PRESENT  PICC Triple Lumen 07/22/22 Right Basilic Vascular access-Site Assessment: WDL  Cardiac Monitoring: None  Code Status: Full Code      Disposition Plan      Expected Discharge Date: 09/06/2022    Discharge Delays: Insurance Authorization needed  Placement - TCU  Destination: inpatient rehabilitation facility  Discharge Comments: TCU pending, they may decline. Difficult placement        The patient's care was discussed with the Bedside Nurse, Patient and Patient's Family.  Updated daughter via phone 9/4.    Sekou Yeboah MD  Hospitalist Service  [facility]  Securely message with the Vocera Web Console (learn more here)  Text page via [employer] Paging/[employer]         Clinically Significant Risk Factors Present on Admission                      ______________________________________________________________________    Interval History   Reports ongoing headache.  Ongoing abdominal pain.  No  Fever/chills or dyspnea, no other complaints.     Data reviewed today: I reviewed all medications, new labs and imaging results over the last 24 hours. I personally reviewed no images or EKG's today.    Physical Exam   Vital Signs: Temp: 97.6  F (36.4  C) Temp src: Axillary BP: 114/60 Pulse: 87   Resp: 16 SpO2: 97 % O2 Device: None (Room air)    Weight: 110 lbs 14.26 oz     General Appearance: thin female in NAD, lying in bed  Respiratory: lungs CTAB, no wheezing or tachypnea   Cardiovascular: RRR, normal s1/s2 without murmur  GI: abdomen soft, normal bowel sounds, reports diffuse tenderness, nondistended  Skin: no peripheral edema  Other: Alert, oriented, person, hospital, year and season, cranial nerves grossly intact     Data   Recent Labs   Lab 09/05/22  1313 09/05/22  1240 09/05/22  0600 09/03/22  0823 09/03/22  0529 09/02/22  1719 09/02/22  1253 08/31/22  1328 08/31/22  0630    NA  --   --  138  --   --   --   --   --  142   POTASSIUM  --   --  3.6  --  3.9  --  3.8  --  3.8   CHLORIDE  --   --  107  --   --   --   --   --  110*   CO2  --   --  28  --   --   --   --   --  28   BUN  --   --  15  --   --   --   --   --  18   CR  --   --  0.44*  --   --   --   --   --  0.43*   ANIONGAP  --   --  3  --   --   --   --   --  4   ADY  --   --  9.0  --   --   --   --   --  9.2   * 122* 117*   < >  --    < >  --    < > 115*    < > = values in this interval not displayed.

## 2022-09-05 NOTE — PROGRESS NOTES
"   09/05/22 1119   General Information   Onset of Illness/Injury or Date of Surgery 07/27/22   Referring Physician Sekou Yeboah MD   Patient/Family Therapy Goal Statement (SLP) unable   Pertinent History of Current Problem \"Julisa Chaney is a 77 year old female admitted on 7/27/2022.  PMH of trigeminal neuralgia who was recently admitted to [facility] on 7/19 for severe sepsis due to COVID-19 infection and suspected bacterial pneumonia. She was treated with 5 days of remdesivir and did not require any steroids, also treated with IV vancomycin and IV meropenem. 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  She was switched to IV vancomycin, cefepime and metronidazole and transferred to [facility] for neurosurgery assessment. MRI brain w/wo contrast 7/30 showed ventriculitis with probable abscess. Neurosurgery/neurology/infectious disease/oncology consulted. Patient taken to the OR on 7/31 for left frontal ventriculostomy.   Since then has remained on broad spectrum antibiotics.  In addition she has developed C. Difficile colitis and is on treatment.  She has continued to have a significant encephalopathy. S/p left frontal ventriculostomy 7/31/22. - Nutrition following for tube feeds via G tube (placed 8/16) ; getting free water flushes - Will need SLP consult when more able to participate\"   General Observations Pt alert, confused and required several redirections   Past History of Dysphagia Significant history - see EMR - appears to have discharged on minced and moist with midly thick liquids. Silent aspiration on thin liquids.   Type of Evaluation   Type of Evaluation Swallow Evaluation   Oral Motor   Oral Musculature unable to assess due to poor " participation/comprehension   Vocal Quality/Secretion Management (Oral Motor)   Vocal Quality (Oral Motor) WNL   General Swallowing Observations   Current Diet/Method of Nutritional Intake (General Swallowing Observations, NIS) NPO;gastrostomy tube (PEG)   Swallowing Evaluation Clinical swallow evaluation   Respiratory Support (General Swallowing Observations) none   Clinical Swallow Evaluation   Feeding Assistance dependent   Clinical Swallow Eval: Thin Liquid Texture Trial   Volume of Liquid or Food Presented ice chips x2   Oral Phase of Swallow impaired mastication;delayed AP movement;effortful AP movement;premature pharyngeal entry;residue in oral cavity   Diagnostic Statement No overt s/sx of aspiration; high risk of silent aspiration   Pharyngeal Phase of Swallow impaired;reduction in laryngeal movement;no awareness of problems   Swallowing Recommendations   Diet Consistency Recommendations NPO   Medication Administration Recommendations, Swallowing (SLP) non-oral   General Therapy Interventions   Planned Therapy Interventions Dysphagia Treatment   Dysphagia treatment Oropharyngeal exercise training;Modified diet education   Clinical Impression   Criteria for Skilled Therapeutic Interventions Met (SLP Eval) Yes, treatment indicated   SLP Diagnosis Dysphagia   Risks & Benefits of therapy have been explained evaluation/treatment results reviewed;care plan/treatment goals reviewed;risks/benefits reviewed;current/potential barriers reviewed;patient;participants included   Clinical Impression Comments Limited evaluation completed d/t pt acceptance. Pt alert in bed. Oriented to self only. Pt did not recall hx of SLP services or dysphagia. Ice chips x2 trialed with limited oral awareness and mastication. Delay and high risk of silent aspiration given history. 1/2 tsp pudding x1 with majority of bolus spilling out of mouth. Pt did have awarness and used tongue to sweep. Delayed initiation. No overt s/sx of aspiration.  Despite education and encouragement, pt declined any additional trials. Continue NPO with PEG and oral cares. Consider sparce amount of ice chips (1-5/hour) with nursing for comfort and practice swallowing. Will continue to follow and anticipate need for repeat Video Swallow Study prior to diet initiation.   SLP Discharge Planning   SLP Discharge Recommendation Transitional Care Facility   SLP Rationale for DC Rec Ongoing SLP services for severe dysphagia; consider speech/language/cognitive evaluation when pt appropriate   SLP Brief overview of current status  Continue NPO with PEG and oral cares. Consider sparce amount of ice chips (1-5/hour) with nursing for comfort and practice swallowing. Will continue to follow and anticipate need for repeat Video Swallow Study prior to diet initiation.    Total Evaluation Time   Total Evaluation Time (Minutes) 15   SLP Goals   Therapy Frequency (SLP Eval) 5 times/wk   SLP Predicted Duration/Target Date for Goal Attainment 09/26/22   SLP Goals Swallow   SLP: Safely tolerate diet without signs/symptoms of aspiration One P.O. texture   Psychosocial Support   Trust Relationship/Rapport care explained

## 2022-09-05 NOTE — PLAN OF CARE
Goal Outcome Evaluation:      A/Ox2. Disoriented to time/situation. VSS on RA. Tele- NSR. Abdominal pain/gen weakness- PRN tylenol. NPO. Peg tube infusing tube feeding continuously @ 50 mL/hr. Declined most of neuro exam. Declined PT. Incontinent of B/B. Reddened buttock and khushbu area- barrier cream in room. A2 w/ lift.. Turn/repo Q2. Right arm triple lumen picc. Pending TCU.

## 2022-09-06 ENCOUNTER — APPOINTMENT (OUTPATIENT)
Dept: OCCUPATIONAL THERAPY | Facility: CLINIC | Age: 77
DRG: 023 | End: 2022-09-06
Attending: HOSPITALIST
Payer: COMMERCIAL

## 2022-09-06 ENCOUNTER — APPOINTMENT (OUTPATIENT)
Dept: SPEECH THERAPY | Facility: CLINIC | Age: 77
DRG: 023 | End: 2022-09-06
Attending: INTERNAL MEDICINE
Payer: COMMERCIAL

## 2022-09-06 ENCOUNTER — APPOINTMENT (OUTPATIENT)
Dept: PHYSICAL THERAPY | Facility: CLINIC | Age: 77
DRG: 023 | End: 2022-09-06
Attending: HOSPITALIST
Payer: COMMERCIAL

## 2022-09-06 LAB
GLUCOSE BLDC GLUCOMTR-MCNC: 104 MG/DL (ref 70–99)
GLUCOSE BLDC GLUCOMTR-MCNC: 109 MG/DL (ref 70–99)
GLUCOSE BLDC GLUCOMTR-MCNC: 113 MG/DL (ref 70–99)
GLUCOSE BLDC GLUCOMTR-MCNC: 85 MG/DL (ref 70–99)
PHOSPHATE SERPL-MCNC: 1.9 MG/DL (ref 2.5–4.5)
POTASSIUM BLD-SCNC: 3.7 MMOL/L (ref 3.4–5.3)

## 2022-09-06 PROCEDURE — 99233 SBSQ HOSP IP/OBS HIGH 50: CPT | Performed by: INTERNAL MEDICINE

## 2022-09-06 PROCEDURE — 99232 SBSQ HOSP IP/OBS MODERATE 35: CPT | Performed by: HOSPITALIST

## 2022-09-06 PROCEDURE — 97530 THERAPEUTIC ACTIVITIES: CPT | Mod: GP | Performed by: PHYSICAL THERAPIST

## 2022-09-06 PROCEDURE — 250N000011 HC RX IP 250 OP 636: Performed by: HOSPITALIST

## 2022-09-06 PROCEDURE — 84132 ASSAY OF SERUM POTASSIUM: CPT | Performed by: HOSPITALIST

## 2022-09-06 PROCEDURE — 250N000013 HC RX MED GY IP 250 OP 250 PS 637: Performed by: HOSPITALIST

## 2022-09-06 PROCEDURE — 250N000011 HC RX IP 250 OP 636: Performed by: INTERNAL MEDICINE

## 2022-09-06 PROCEDURE — 258N000003 HC RX IP 258 OP 636: Performed by: HOSPITALIST

## 2022-09-06 PROCEDURE — 97162 PT EVAL MOD COMPLEX 30 MIN: CPT | Mod: GP | Performed by: PHYSICAL THERAPIST

## 2022-09-06 PROCEDURE — 250N000013 HC RX MED GY IP 250 OP 250 PS 637: Performed by: INTERNAL MEDICINE

## 2022-09-06 PROCEDURE — 84100 ASSAY OF PHOSPHORUS: CPT | Performed by: HOSPITALIST

## 2022-09-06 PROCEDURE — 97530 THERAPEUTIC ACTIVITIES: CPT | Mod: GO

## 2022-09-06 PROCEDURE — 120N000001 HC R&B MED SURG/OB

## 2022-09-06 PROCEDURE — 92526 ORAL FUNCTION THERAPY: CPT | Mod: GN | Performed by: SPEECH-LANGUAGE PATHOLOGIST

## 2022-09-06 PROCEDURE — 250N000013 HC RX MED GY IP 250 OP 250 PS 637

## 2022-09-06 RX ADMIN — ACETAMINOPHEN 650 MG: 325 SUSPENSION ORAL at 00:27

## 2022-09-06 RX ADMIN — VANCOMYCIN HYDROCHLORIDE 125 MG: KIT at 22:33

## 2022-09-06 RX ADMIN — Medication 1 PACKET: at 10:09

## 2022-09-06 RX ADMIN — POTASSIUM & SODIUM PHOSPHATES POWDER PACK 280-160-250 MG 2 PACKET: 280-160-250 PACK at 13:15

## 2022-09-06 RX ADMIN — VANCOMYCIN HYDROCHLORIDE 1000 MG: 1 INJECTION, SOLUTION INTRAVENOUS at 02:43

## 2022-09-06 RX ADMIN — TOPIRAMATE 50 MG: 50 TABLET ORAL at 22:33

## 2022-09-06 RX ADMIN — VANCOMYCIN HYDROCHLORIDE 125 MG: KIT at 10:09

## 2022-09-06 RX ADMIN — LEVETIRACETAM 1000 MG: 100 SOLUTION ORAL at 10:09

## 2022-09-06 RX ADMIN — ACETAMINOPHEN 650 MG: 325 SUSPENSION ORAL at 10:10

## 2022-09-06 RX ADMIN — POTASSIUM & SODIUM PHOSPHATES POWDER PACK 280-160-250 MG 2 PACKET: 280-160-250 PACK at 22:34

## 2022-09-06 RX ADMIN — Medication 1 PACKET: at 13:15

## 2022-09-06 RX ADMIN — Medication 40 MG: at 10:09

## 2022-09-06 RX ADMIN — MEROPENEM 2 G: 1 INJECTION, POWDER, FOR SOLUTION INTRAVENOUS at 04:31

## 2022-09-06 RX ADMIN — IBUPROFEN 400 MG: 200 TABLET, FILM COATED ORAL at 13:15

## 2022-09-06 RX ADMIN — OXCARBAZEPINE 450 MG: 300 SUSPENSION ORAL at 22:33

## 2022-09-06 RX ADMIN — VANCOMYCIN HYDROCHLORIDE 125 MG: KIT at 17:07

## 2022-09-06 RX ADMIN — ACETAMINOPHEN 650 MG: 325 SUSPENSION ORAL at 22:33

## 2022-09-06 RX ADMIN — QUETIAPINE FUMARATE 50 MG: 50 TABLET ORAL at 22:33

## 2022-09-06 RX ADMIN — Medication 1 PACKET: at 17:08

## 2022-09-06 RX ADMIN — ACETAMINOPHEN 650 MG: 325 SUSPENSION ORAL at 17:02

## 2022-09-06 RX ADMIN — MICONAZOLE NITRATE: 20 CREAM TOPICAL at 10:13

## 2022-09-06 RX ADMIN — MEROPENEM 2 G: 1 INJECTION, POWDER, FOR SOLUTION INTRAVENOUS at 13:15

## 2022-09-06 RX ADMIN — VANCOMYCIN HYDROCHLORIDE 125 MG: KIT at 13:15

## 2022-09-06 RX ADMIN — MEROPENEM 2 G: 1 INJECTION, POWDER, FOR SOLUTION INTRAVENOUS at 22:35

## 2022-09-06 RX ADMIN — LEVETIRACETAM 1000 MG: 100 SOLUTION ORAL at 22:33

## 2022-09-06 RX ADMIN — MICONAZOLE NITRATE: 20 CREAM TOPICAL at 22:33

## 2022-09-06 RX ADMIN — INSULIN GLARGINE 8 UNITS: 100 INJECTION, SOLUTION SUBCUTANEOUS at 22:40

## 2022-09-06 RX ADMIN — POTASSIUM & SODIUM PHOSPHATES POWDER PACK 280-160-250 MG 2 PACKET: 280-160-250 PACK at 17:03

## 2022-09-06 RX ADMIN — Medication 15 ML: at 10:09

## 2022-09-06 RX ADMIN — ACETAMINOPHEN 650 MG: 325 SUSPENSION ORAL at 04:31

## 2022-09-06 RX ADMIN — IBUPROFEN 400 MG: 200 TABLET, FILM COATED ORAL at 22:33

## 2022-09-06 ASSESSMENT — ACTIVITIES OF DAILY LIVING (ADL)
ADLS_ACUITY_SCORE: 45
ADLS_ACUITY_SCORE: 48
ADLS_ACUITY_SCORE: 44
ADLS_ACUITY_SCORE: 48
ADLS_ACUITY_SCORE: 45
ADLS_ACUITY_SCORE: 45
ADLS_ACUITY_SCORE: 44
ADLS_ACUITY_SCORE: 45
ADLS_ACUITY_SCORE: 44
ADLS_ACUITY_SCORE: 48

## 2022-09-06 NOTE — PLAN OF CARE
Shift Note [id number]:   Patient admitted to unit for cerebral abscess. Patient is alert and oriented x3-disoriented to situation. Assist x2, lift. VSS. C/o head pain, prn tylenol given. Aggression Stop Light green. Tele: SR.     Patient not willing to ambulate/ work with therapy. Encouraged but still denies wanting to move. Peg tube patent and infusing TF per orders. Continues on oral and IV ABX therapy. Continues on Enteric and Contact precautions.     Major Shift Events: phosphorus being replaced.

## 2022-09-06 NOTE — PROGRESS NOTES
MD Notification    Notified Person: MD    Notified Person Name: Jud    Notification Date/Time: 5:19 PM      Notification Interaction: JAZIEL    Purpose of Notification:Can we d/c neuros pt often refuses them. In addition blood sugars have been stable do we still need q8 hours? Or just daily. Pt also seems to be severely depressed could we place a palliative/hospice consult? No rush on recommendations.    Orders Received: Provider placed orders promptly    Comments:

## 2022-09-06 NOTE — PROGRESS NOTES
"   09/06/22 0922   Quick Adds   Type of Visit Initial PT Evaluation   Living Environment   People in Home grandchild(geetha)   Current Living Arrangements house   Living Environment Comments Difficult to get pateint to answer questions. Did report she lives wit her gransson and was I prior to admission but not answering any further questions.   Self-Care   Activity/Exercise/Self-Care Comment Patient not willing to answer questions.   General Information   Onset of Illness/Injury or Date of Surgery 07/27/22   Referring Physician Sekou Yeboah   Patient/Family Therapy Goals Statement (PT) Patient reports \"I want to go home.\"   Pertinent History of Current Problem (include personal factors and/or comorbidities that impact the POC) Per chart Julisa Chaney is a 77 year old female admitted on 7/27/2022.  PMH of trigeminal neuralgia who was recently admitted to [facility] on 7/19 for severe sepsis due to COVID-19 infection and suspected bacterial pneumonia. She was treated with 5 days of remdesivir and did not require any steroids, also treated with IV vancomycin and IV meropenem.     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  She was switched to IV vancomycin, cefepime and metronidazole and transferred to [facility] for neurosurgery assessment. MRI brain w/wo contrast 7/30 showed ventriculitis with probable abscess. Neurosurgery/neurology/infectious disease/oncology consulted. Patient taken to the OR on 7/31 for left frontal ventriculostomy.   Since then has remained on broad spectrum antibiotics.  In addition she has developed C. Difficile colitis and is on treatment.  She has continued to have a significant encephalopathy which is very slowly improving. "   Existing Precautions/Restrictions fall   General Observations Pateint needs a lot of encouragement to participate. Mininmally verbal.   Cognition   Affect/Mental Status (Cognition) confused;flat/blunted affect  (Uncooperative with mobility.)   Cognitive Status Comments Difficult to determine orientation as she refuses to answer most questions.   Pain Assessment   Patient Currently in Pain No   Posture    Posture Forward head position;Protracted shoulders   Posture Comments Sits with head flexed but appears to be due to lack of effort.   Range of Motion (ROM)   ROM Comment Appears WFL. HS's tight with knee extension in sitting.   Strength Comprehensive (MMT)   Comment, General Manual Muscle Testing (MMT) Assessment Difficult to assess due to lack of patient effort. LE strength grossly 3/5. Anticipate she could support herself in standing if she would make the effort.   Bed Mobility   Comment, (Bed Mobility) Max A 2 sup to sit due to lack of pateint effort.   Transfers   Comment, (Transfers) Not willing to attempt standing.   Balance   Balance Comments Able to hold herself up with support of rail but minimal effort at times.   Clinical Impression   Criteria for Skilled Therapeutic Intervention Yes, treatment indicated   PT Diagnosis (PT) Impaired functional indpendence and tolerance.   Influenced by the following impairments Weakness, impaired cognition   Functional limitations due to impairments Needs asssit for all mobility.   Clinical Presentation (PT Evaluation Complexity) Stable/Uncomplicated   Clinical Presentation Rationale >3 factirs affecting moblity.   Clinical Decision Making (Complexity) moderate complexity   Planned Therapy Interventions (PT) balance training;bed mobility training;gait training;strengthening;transfer training   Risk & Benefits of therapy have been explained patient   PT Discharge Planning   PT Discharge Recommendation (DC Rec) Transitional Care Facility   PT Rationale for DC Rec Patient  appears to be well below baseline although not 100% sure what her baseline is as she answers few questions. She reported she was I and lived with her grandson prior to admission. Currently she needs assist of 2 for mobility but anticipate she would need less assist if she put forth effort.   PT Brief overview of current status Need to use lift due to limited patient effort.   Plan of Care Review   Plan of Care Reviewed With patient   Total Evaluation Time   Total Evaluation Time (Minutes) 8   Physical Therapy Goals   PT Frequency 3x/week   PT Predicted Duration/Target Date for Goal Attainment 09/16/22   PT Goals Bed Mobility;Transfers;Gait;Stairs;PT Goal 1   PT: Bed Mobility Rolling;Supine to/from sit;Minimal assist   PT: Transfers Moderate assist;Assistive device;Bed to/from chair;Sit to/from stand   PT: Gait Moderate assist;Rolling walker;10 feet   PT: Goal 1 Patient will maintain static sitting balance without UE support for at least 5 min.   Psychosocial Support   Trust Relationship/Rapport care explained

## 2022-09-06 NOTE — PROGRESS NOTES
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 Has been on broad spectrum antibiotics, now on Meropenem/Vancomycin, mental status improved, s/p left frontal ventriculostomy CSF cxs have remained negative. Course complicated by development of C.diff colitis.   Headache is back, not too bad and improves with tylenol. Discussed need for imaging. S/P CT scan: 8/ 29 IMPRESSION:  1.  The previously noted hydrocephalic left lateral ventricle has decreased in size. The left temporal horn remains slightly dilated but no samuel hydrocephalus.  2.  No acute findings.     Recommendations  1. Continue current antimicrobial therapy Vancomycin, Meropenem.  6 week treatment planned until 9/8  2. Oral vancomycin 125 mg BID to be continued till 9/22    Cayetano Schmitt MD    Interval History   Remains afebrile   Wants to go home   Complaints of abdominal pain   No diarrhea Labs and vancomycin levels reviewed   Ct scan reviewed  No changes to ROS  No changes to Social history, Family history and medical history     Afebrile   Physical Exam   Temp: 96.8  F (36  C) Temp src: Axillary BP: 137/69 Pulse: 82   Resp: 18 SpO2: 94 % O2 Device: None (Room air)    Vitals:    08/31/22 0500 09/04/22 0618 09/05/22 0552   Weight: 48 kg (105 lb 13.1 oz) 50.6 kg (111 lb 8.8 oz) 50.3 kg (110 lb 14.3 oz)     Vital Signs with Ranges  Temp:  [96.8  F (36  C)-98.6  F (37  C)] 96.8  F (36  C)  Pulse:  [67-87] 82  Resp:  [16-18] 18  BP: (114-137)/(51-69) 137/69  SpO2:  [94 %-100 %] 94 %    Constitutional: awake, lying in the bed   Lungs: Clear to auscultation bilaterally, no crackles or wheezing  Cardiovascular: Regular rate and rhythm, normal S1 and S2, and no murmur noted  Abdomen: soft, non tender in palpation   Skin: No  rash    Other:    Medications    - MEDICATION INSTRUCTIONS -        banatrol plus  1 packet Per Feeding Tube TID w/meals    insulin aspart  1-6 Units Subcutaneous Q8H    insulin glargine  8 Units Subcutaneous At Bedtime    levETIRAcetam  1,000 mg Oral or Feeding Tube Q12H    meropenem  2 g Intravenous Q8H    miconazole with skin protectant   Topical BID    multivitamins w/minerals  15 mL Per Feeding Tube Daily    OXcarbazepine  450 mg Oral or Feeding Tube At Bedtime    pantoprazole  40 mg Oral or Feeding Tube QAM AC    QUEtiapine  50 mg Oral At Bedtime    sodium chloride (PF)  10-40 mL Intracatheter Q7 Days    topiramate  50 mg Oral or Feeding Tube At Bedtime    vancomycin  125 mg Oral or Feeding Tube 4x Daily    vancomycin  1,000 mg Intravenous Q18H       Data   All microbiology laboratory data reviewed.  Recent Labs   Lab Test 09/05/22  1514 08/26/22  0557 08/23/22  1920   WBC 10.8 6.4 7.4   HGB 10.5* 10.5* 10.2*   HCT 35.5 36.1 35.5   MCV 85 84 84    533* 553*     Recent Labs   Lab Test 09/05/22  0600 08/31/22  0630 08/29/22  0610   CR 0.44* 0.43* 0.41*     Recent Labs   Lab Test 08/08/20  0212   SED 13

## 2022-09-06 NOTE — PROGRESS NOTES
[facility]    Medicine Progress Note - Hospitalist Service    Date of Admission:  7/27/2022    Assessment & Plan        Julisa Chaney is a 77 year old female admitted on 7/27/2022.  PMH of trigeminal neuralgia who was recently admitted to [facility] on 7/19 for severe sepsis due to COVID-19 infection and suspected bacterial pneumonia. She was treated with 5 days of remdesivir and did not require any steroids, also treated with IV vancomycin and IV meropenem.     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  She was switched to IV vancomycin, cefepime and metronidazole and transferred to [facility] for neurosurgery assessment. MRI brain w/wo contrast 7/30 showed ventriculitis with probable abscess. Neurosurgery/neurology/infectious disease/oncology consulted. Patient taken to the OR on 7/31 for left frontal ventriculostomy.   Since then has remained on broad spectrum antibiotics.  In addition she has developed C. Difficile colitis and is on treatment.  She has continued to have a significant encephalopathy which is very slowly improving.     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  * flow cytometry did not show any evidence of malignancy.  * s/p Left frontal ventriculostomy on 7/31/2022, EVD removed 8/8; CSF cultures have remained negative; CSF counts decreasing 469---233 ---56 (last CSF from 8/18)  * Head CT from 8/19 with stable presumed vasogenic edema in the white matter of the anterior inferior left frontal lobe related to ventriculitis of the left lateral ventricle; stable ventriculomegaly  of the left lateral ventricle   * Has been having persistent headaches; reevaluated by neurosurgery 8/27 and recommended head CT, done on 8/29-->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 The left temporal horn remains slightly dilated but no samuel hydrocephalus. No acute changes.  - Remains afebrile; leukocytosis resolved ; was initially on vancomycin, cefepime, and metronidazole, then Meropenem and Vancomycin; ID signed off, plan for 6 weeks treatment until 9/8/22.  - Continue scheduled Tylenol 650 mg TID. PRN ibuprofen ordered.  - Trying to avoid any narcotics with her confusion/ encephalopathy.  - PT/OT consulted, however patient does not put much effort into therapies currently.  - Progress has been slow and fairly minimal. Concern about overall prognosis. Briefly discussed palliative care with patient's daughter on 9/6/22. She requested re-evaluation from psychiatry regarding depression, will consider discussion about palliative care pending psychiatry recommendations.     Acute infectious encephalopathy due to above:  * mentation much improved since admission ; fluctuating at times ; will not engage in conversation most of the times.  * 8/23 discontinued prn benadryl, narcotics; minimize narcotic.  - Continue to treat underlying issues as noted above.   - Maintain normal sleep/wake cycle as able.  - AUse minimal single dosing of narcotic if absolutely needed for pain, no standing order.     Suicidal throughs/agitation  Depression  * With improvement in her mentation, she has started becoming at times restless and agitated. On 8/27, reported having suicidal thoughts. No plans/attempts.  * initiated suicide precautions and sitter, evaluated by psychiatry service, discontinued.   - Reassess periodically for ongoing need.  - Anti depressant not recommended currently in the setting of delirium/encephalopathy.  - HS seroquel increased 8/31.  - Will ask psychiatry to see her again regarding ongoing  depressive symptoms.     C. difficile colitis:  Patient was noted to have copious diarrhea on 8/1 and was noted positive for C. Difficile.  * Rectal tube came off 8/28 still with some diarrhea but not severe  -Continue oral vancomycin - till 9/22.  - ID following.     Acute on chronic anemia:  Prior labs show baseline hemoglobin of about 9-10.   * Hb on admission was 11.7, slowly trended down, got 1 unit PRBC on 8/8/22 for Hb 6.9  * Received IV iron infusion x3 with first on 8/11/22.  - Iron panel on 7/30/22 showed that her total iron was low at 14, binding capacity was low at 28, iron saturation was 6 and B12 was high and folate was normal  * No obvious ongoing bleeding noted; Hb has now remained stable around 9 to 10 on 8/26  - Continue to monitor hgb periodically.     History of trigeminal neuralgia  - Continue PTA Trileptal , Keppra and Topamax.     History of chronic pyloric ulcer  She had EGD done in 2020 which showed gastroduodenitis with a duodenal stricture.  - Continue PPI.     Hyperglycemia, resolved  Recent HbA1c was 5.6 on 7/19/22. Was not on any medication prior to admission.  - Continue medium dose sliding scale insulin.  - Requiring no supplemental insulin and no hypoglycemia, glucose checks q8h during the day and at 0200.  - Decreased lantus to 8 units nightly 9/2.      Intermittent Hypokalemia, Hypo/hypernatremia, Hypophosphatemia:  - Replacement per protocol.     Hypernatremia: resolved.  - Continue free water flushes 200 ml every 4 hrs.     Severe dysphagia  Severe protein calorie malnutrition:  - Nutrition following for tube feeds via G tube (placed 8/16) ; getting free water flushes.  - Limited re-evaluation 9/5 by SLP; recommend continued NPO status.     Physical deconditioning:  - Will need TCU placement; declined by LTACH; continue therapy as able to participate.  - SW following for disposition.       Diet: NPO for Medical/Clinical Reasons Except for: Other; Specify: NPO For oral intake and  gastric feedings for 6 hours post insertion Gastrostomy G/GJ tube. May feed through Jejunal or Distal PORT immediately for GJ tubes ONLY.  Adult Formula Drip Feeding: Continuous Vital 1.5; Gastrostomy; Goal Rate: 50; mL/hr; Medication - Feeding Tube Flush Frequency: At least 15-30 mL water before and after medication administration and with tube clogging; Amount to Send (Nutrition us...    DVT Prophylaxis: Pneumatic Compression Devices  Abrams Catheter: Not present  Central Lines: PRESENT  PICC Triple Lumen 07/22/22 Right Basilic Vascular access-Site Assessment: WDL  Cardiac Monitoring: None  Code Status: Full Code      Disposition Plan      Expected Discharge Date: 09/07/2022    Discharge Delays: Insurance Authorization needed  Placement - TCU  Destination: inpatient rehabilitation facility  Discharge Comments: TCU pending, they may decline. Difficult placement        The patient's care was discussed with the Bedside Nurse, Patient and Patient's Family.    Elton Nguyen MD  Hospitalist Service  [facility]  Securely message with the Vocera Web Console (learn more here)  Text page via [employer] Paging/Directory         Clinically Significant Risk Factors Present on Admission                      ______________________________________________________________________    Interval History   Julisa Chaney was seen this morning. Has some mild abdominal discomfort. Slight headache. Denies fevers, chest pain, shortness of breath, nausea. Feels like she wants to die, but denies suicidal ideation. Seems frustrated with prolonged hospitalization. States she is in her 80s and has lived a long enough life.    Data reviewed today: I reviewed all medications, new labs and imaging results over the last 24 hours. I personally reviewed no images or EKG's today.    Physical Exam   Vital Signs: Temp: 99.4  F (37.4  C) Temp src: Axillary BP: 116/64 Pulse: 94   Resp: 18 SpO2: 97 % O2 Device: None (Room air)     Weight: 110 lbs 14.26 oz  Constitutional: awake, alert, cooperative, no apparent distress, laying in the hospital bed, frail  Respiratory: clear to auscultation bilaterally, no crackles or wheezing  Cardiovascular: regular rate and rhythm, normal S1 and S2, no murmur noted  GI: normal bowel sounds, soft, non-distended, mild generalized tenderness, LUQ PEG tube in place  Skin: warm, dry  Musculoskeletal: no lower extremity pitting edema present  Neurologic: awake, alert, answers questions appropriately, moves all extremities    Data   Recent Labs   Lab 09/06/22  2119 09/06/22  1638 09/06/22  1302 09/06/22  1030 09/05/22  2021 09/05/22  1514 09/05/22  1240 09/05/22  0600 09/03/22  0823 09/03/22  0529 08/31/22  1328 08/31/22  0630   WBC  --   --   --   --   --  10.8  --   --   --   --   --   --    HGB  --   --   --   --   --  10.5*  --   --   --   --   --   --    MCV  --   --   --   --   --  85  --   --   --   --   --   --    PLT  --   --   --   --   --  353  --   --   --   --   --   --    NA  --   --   --   --   --   --   --  138  --   --   --  142   POTASSIUM  --   --   --  3.7  --   --   --  3.6  --  3.9   < > 3.8   CHLORIDE  --   --   --   --   --   --   --  107  --   --   --  110*   CO2  --   --   --   --   --   --   --  28  --   --   --  28   BUN  --   --   --   --   --   --   --  15  --   --   --  18   CR  --   --   --   --   --   --   --  0.44*  --   --   --  0.43*   ANIONGAP  --   --   --   --   --   --   --  3  --   --   --  4   ADY  --   --   --   --   --   --   --  9.0  --   --   --  9.2   GLC 85 104* 109*  --    < >  --    < > 117*   < >  --    < > 115*    < > = values in this interval not displayed.     Medications     - MEDICATION INSTRUCTIONS -         banatrol plus  1 packet Per Feeding Tube TID w/meals     insulin aspart  1-6 Units Subcutaneous Q8H     insulin glargine  8 Units Subcutaneous At Bedtime     levETIRAcetam  1,000 mg Oral or Feeding Tube Q12H     meropenem  2 g Intravenous Q8H      miconazole with skin protectant   Topical BID     multivitamins w/minerals  15 mL Per Feeding Tube Daily     OXcarbazepine  450 mg Oral or Feeding Tube At Bedtime     pantoprazole  40 mg Oral or Feeding Tube QAM AC     potassium & sodium phosphates  2 packet Oral or Feeding Tube Q4H     QUEtiapine  50 mg Oral At Bedtime     sodium chloride (PF)  10-40 mL Intracatheter Q8H     sodium chloride (PF)  10-40 mL Intracatheter Q7 Days     topiramate  50 mg Oral or Feeding Tube At Bedtime     vancomycin  125 mg Oral or Feeding Tube 4x Daily     vancomycin  1,000 mg Intravenous Q18H

## 2022-09-06 NOTE — PLAN OF CARE
Reason for Admission: L brain abscess, sepsis, encephalopathy  Cognitive/Mentation: A/Ox 2, disoriented to time and situation.   Neuros/CMS: Stable with generalized weakness. Following minimal commands. Spontaneously moving all extremities.   VS: VSS on RA  Tele: NSR  GI: BS audible, + flatus, last BM 9/5/22. Incontinent.  : Voiding adequately, refusing Purewick. Incontinent.  Pulmonary: LS diminished  Pain: HA and body pain, prn Tylenol and Ibuprofen given.   Drains/Lines: R triple lumen picc. PEG tube infusing TF.   Skin: Intact, redness in perineum/buttocks area- skin barrier applied. Mepilex in place.   Activity: Assist x 2 with lift device.  Diet: NPO diet with tube feedings running at goal of 50 mL/hr with q4hr 200 mL flushes. Takes pills crushed in peg tube.    Therapies recs: TCU  Discharge: Pending  Aggression Stoplight Tool: Green  End of shift summary: No changes this shift. MRSA/C-diff precautions maintained.

## 2022-09-07 ENCOUNTER — APPOINTMENT (OUTPATIENT)
Dept: SPEECH THERAPY | Facility: CLINIC | Age: 77
DRG: 023 | End: 2022-09-07
Attending: INTERNAL MEDICINE
Payer: COMMERCIAL

## 2022-09-07 LAB
GLUCOSE BLDC GLUCOMTR-MCNC: 107 MG/DL (ref 70–99)
GLUCOSE BLDC GLUCOMTR-MCNC: 118 MG/DL (ref 70–99)
GLUCOSE BLDC GLUCOMTR-MCNC: 83 MG/DL (ref 70–99)
GLUCOSE BLDC GLUCOMTR-MCNC: 99 MG/DL (ref 70–99)
PHOSPHATE SERPL-MCNC: 3.9 MG/DL (ref 2.5–4.5)
POTASSIUM BLD-SCNC: 4 MMOL/L (ref 3.4–5.3)
VANCOMYCIN SERPL-MCNC: 22.2 MG/L

## 2022-09-07 PROCEDURE — 999N000190 HC STATISTIC VAT ROUNDS

## 2022-09-07 PROCEDURE — 258N000003 HC RX IP 258 OP 636: Performed by: HOSPITALIST

## 2022-09-07 PROCEDURE — 250N000013 HC RX MED GY IP 250 OP 250 PS 637

## 2022-09-07 PROCEDURE — 250N000011 HC RX IP 250 OP 636: Performed by: INTERNAL MEDICINE

## 2022-09-07 PROCEDURE — 250N000013 HC RX MED GY IP 250 OP 250 PS 637: Performed by: HOSPITALIST

## 2022-09-07 PROCEDURE — 250N000013 HC RX MED GY IP 250 OP 250 PS 637: Performed by: INTERNAL MEDICINE

## 2022-09-07 PROCEDURE — 80202 ASSAY OF VANCOMYCIN: CPT | Performed by: INTERNAL MEDICINE

## 2022-09-07 PROCEDURE — 250N000011 HC RX IP 250 OP 636: Performed by: HOSPITALIST

## 2022-09-07 PROCEDURE — 92526 ORAL FUNCTION THERAPY: CPT | Mod: GN | Performed by: SPEECH-LANGUAGE PATHOLOGIST

## 2022-09-07 PROCEDURE — 84100 ASSAY OF PHOSPHORUS: CPT | Performed by: HOSPITALIST

## 2022-09-07 PROCEDURE — 99232 SBSQ HOSP IP/OBS MODERATE 35: CPT | Performed by: HOSPITALIST

## 2022-09-07 PROCEDURE — 84132 ASSAY OF SERUM POTASSIUM: CPT | Performed by: HOSPITALIST

## 2022-09-07 PROCEDURE — 120N000001 HC R&B MED SURG/OB

## 2022-09-07 PROCEDURE — 250N000012 HC RX MED GY IP 250 OP 636 PS 637: Performed by: HOSPITALIST

## 2022-09-07 RX ADMIN — LEVETIRACETAM 1000 MG: 100 SOLUTION ORAL at 09:26

## 2022-09-07 RX ADMIN — OXCARBAZEPINE 450 MG: 300 SUSPENSION ORAL at 22:35

## 2022-09-07 RX ADMIN — MEROPENEM 2 G: 1 INJECTION, POWDER, FOR SOLUTION INTRAVENOUS at 04:47

## 2022-09-07 RX ADMIN — Medication 40 MG: at 09:26

## 2022-09-07 RX ADMIN — ACETAMINOPHEN 650 MG: 325 SUSPENSION ORAL at 18:14

## 2022-09-07 RX ADMIN — MEROPENEM 2 G: 1 INJECTION, POWDER, FOR SOLUTION INTRAVENOUS at 13:33

## 2022-09-07 RX ADMIN — TOPIRAMATE 50 MG: 50 TABLET ORAL at 22:36

## 2022-09-07 RX ADMIN — INSULIN GLARGINE 8 UNITS: 100 INJECTION, SOLUTION SUBCUTANEOUS at 22:35

## 2022-09-07 RX ADMIN — VANCOMYCIN HYDROCHLORIDE 125 MG: KIT at 09:26

## 2022-09-07 RX ADMIN — VANCOMYCIN HYDROCHLORIDE 125 MG: KIT at 13:33

## 2022-09-07 RX ADMIN — Medication 15 ML: at 09:26

## 2022-09-07 RX ADMIN — Medication 1 PACKET: at 10:20

## 2022-09-07 RX ADMIN — VANCOMYCIN HYDROCHLORIDE 125 MG: KIT at 17:03

## 2022-09-07 RX ADMIN — VANCOMYCIN HYDROCHLORIDE 1000 MG: 1 INJECTION, SOLUTION INTRAVENOUS at 00:03

## 2022-09-07 RX ADMIN — VANCOMYCIN HYDROCHLORIDE 1000 MG: 1 INJECTION, SOLUTION INTRAVENOUS at 17:01

## 2022-09-07 RX ADMIN — MEROPENEM 2 G: 1 INJECTION, POWDER, FOR SOLUTION INTRAVENOUS at 20:01

## 2022-09-07 RX ADMIN — VANCOMYCIN HYDROCHLORIDE 125 MG: KIT at 22:35

## 2022-09-07 RX ADMIN — ACETAMINOPHEN 650 MG: 325 SUSPENSION ORAL at 22:36

## 2022-09-07 RX ADMIN — LEVETIRACETAM 1000 MG: 100 SOLUTION ORAL at 20:01

## 2022-09-07 RX ADMIN — QUETIAPINE FUMARATE 50 MG: 50 TABLET ORAL at 22:36

## 2022-09-07 RX ADMIN — Medication 1 PACKET: at 17:03

## 2022-09-07 RX ADMIN — MICONAZOLE NITRATE: 20 CREAM TOPICAL at 09:27

## 2022-09-07 RX ADMIN — MICONAZOLE NITRATE: 20 CREAM TOPICAL at 20:08

## 2022-09-07 RX ADMIN — Medication 1 PACKET: at 13:38

## 2022-09-07 ASSESSMENT — ACTIVITIES OF DAILY LIVING (ADL)
ADLS_ACUITY_SCORE: 48
ADLS_ACUITY_SCORE: 44
ADLS_ACUITY_SCORE: 48
ADLS_ACUITY_SCORE: 44
ADLS_ACUITY_SCORE: 48
ADLS_ACUITY_SCORE: 40
ADLS_ACUITY_SCORE: 44

## 2022-09-07 NOTE — PLAN OF CARE
"Goal Outcome Evaluation:    Stable. Neuros and BG checks discontinued. Tube feed continues at goal rate. NPO. Turn and repo, perineum is read, tender, educated pt about utilizing pure wick but she declines. Incontinent of bowel and bladder. Potentially placement for TCU.    Pt unmotivated, appears severely depressed and [place] Pt is interested in not being in the hospital and going to the farm (confirmed with daughter there is no farm) to spend the rest of her time there. Pt seems accepting of her future death. Psych to consult. Pt declined a  or music. I asked if she prefers to be alone, pt states yes.      Spoke with Daughter tonight is agreeable to Palliative care consult for their input. Daughter states her mother doesn't enjoy when family visits and asks them to leave shortly after arrival.   "

## 2022-09-07 NOTE — PHARMACY-VANCOMYCIN DOSING SERVICE
Pharmacy Vancomycin Note  Date of Service 2022  Patient's  1945   77 year old, female    Indication: Meningitis  Day of Therapy: 49  Current vancomycin regimen:  1000 mg IV q18h  Current vancomycin monitoring method: Trough (Method 1 = dosing nomogram)  Current vancomycin therapeutic monitoring goal: 15-20 mg/L    Current estimated CrCl = Estimated Creatinine Clearance: 84.7 mL/min (A) (based on SCr of 0.44 mg/dL (L)).    Creatinine for last 3 days  2022:  6:00 AM Creatinine 0.44 mg/dL    Recent Vancomycin Levels (past 3 days)  2022:  4:41 PM Vancomycin 22.2 mg/L    Vancomycin IV Administrations (past 72 hours)                   vancomycin (VANCOCIN) 1000 mg in dextrose 5% 200 mL PREMIX (mg) 1,000 mg New Bag 22 1701     1,000 mg New Bag  0003     1,000 mg New Bag 22 0243     1,000 mg New Bag 22 0831                Nephrotoxins and other renal medications (From now, onward)    Start     Dose/Rate Route Frequency Ordered Stop    22 1200  vancomycin 1250 mg in 0.9% NaCl 250 mL intermittent infusion 1,250 mg         1,250 mg  over 90 Minutes Intravenous EVERY 24 HOURS 22 1844      08/15/22 0000  vancomycin        Note to Pharmacy: Check twice weekly creatinine and vancomycin levels. Dosing per Pharm D based on AUC       08/15/22 1505 22 2359    22 0900  vancomycin (FIRVANQ) oral solution 125 mg         125 mg Oral or Feeding Tube 4 TIMES DAILY 22 0819               Contrast Orders - past 72 hours (72h ago, onward)    None          Interpretation of levels and current regimen:  Vancomycin level is reflective of supratherapeutic level  Has serum creatinine changed greater than 50% in last 72 hours: No  Urine output:  unable to determine  Renal Function: Stable    Plan:  1. Decrease Dose to 1250 mg IV q24h  2. Vancomycin monitoring method: Trough (Method 1 = dosing nomogram)  3. Vancomycin therapeutic monitoring goal: 15-20 mg/L  4. Pharmacy will  check vancomycin levels as appropriate in 3-5 Days.  5. Serum creatinine levels will be ordered daily for the first week of therapy and at least twice weekly for subsequent weeks.    Nasima Lin RP

## 2022-09-07 NOTE — PROGRESS NOTES
[facility]    Medicine Progress Note - Hospitalist Service    Date of Admission:  7/27/2022    Assessment & Plan        Julisa Chaney is a 77 year old female admitted on 7/27/2022.  PMH of trigeminal neuralgia who was recently admitted to [facility] on 7/19 for severe sepsis due to COVID-19 infection and suspected bacterial pneumonia. She was treated with 5 days of remdesivir and did not require any steroids, also treated with IV vancomycin and IV meropenem.     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  She was switched to IV vancomycin, cefepime and metronidazole and transferred to [facility] for neurosurgery assessment. MRI brain w/wo contrast 7/30 showed ventriculitis with probable abscess. Neurosurgery/neurology/infectious disease/oncology consulted. Patient taken to the OR on 7/31 for left frontal ventriculostomy.   Since then has remained on broad spectrum antibiotics.  In addition she has developed C. Difficile colitis and is on treatment.  She has continued to have a significant encephalopathy which is very slowly improving.     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  * flow cytometry did not show any evidence of malignancy.  * s/p Left frontal ventriculostomy on 7/31/2022, EVD removed 8/8; CSF cultures have remained negative; CSF counts decreasing 469---233 ---56 (last CSF from 8/18)  * Head CT from 8/19 with stable presumed vasogenic edema in the white matter of the anterior inferior left frontal lobe related to ventriculitis of the left lateral ventricle; stable ventriculomegaly  of the left lateral ventricle   * Has been having persistent headaches; reevaluated by neurosurgery 8/27 and recommended head CT, done on 8/29-->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 The left temporal horn remains slightly dilated but no samuel hydrocephalus. No acute changes.  - Remains afebrile; leukocytosis resolved ; was initially on vancomycin, cefepime, and metronidazole, then Meropenem and Vancomycin; ID signed off, plan for 6 weeks treatment until 9/8/22.  - Continue scheduled Tylenol 650 mg TID. PRN ibuprofen ordered.  - Trying to avoid any narcotics with her confusion/ encephalopathy.  - PT/OT consulted, however patient does not put much effort into therapies currently.  - Progress has been slow and fairly minimal. Concern about overall prognosis. Briefly discussed palliative care with patient's daughter on 9/6/22. She requested re-evaluation from psychiatry regarding depression, will consider discussion about palliative care pending psychiatry recommendations.     Acute infectious encephalopathy due to above:  * mentation much improved since admission ; fluctuating at times ; will not engage in conversation most of the times.  * 8/23 discontinued prn benadryl, narcotics; minimize narcotic.  - Continue to treat underlying issues as noted above.   - Maintain normal sleep/wake cycle as able.  - Use minimal single dosing of narcotic if absolutely needed for pain, no standing order.     Suicidal throughs/agitation  Depression  * With improvement in her mentation, she has started becoming at times restless and agitated. On 8/27, reported having suicidal thoughts. No plans/attempts.  * initiated suicide precautions and sitter, evaluated by psychiatry service, discontinued.   - Reassess periodically for ongoing need.  - Anti depressant not recommended currently in the setting of delirium/encephalopathy.  - HS seroquel increased 8/31.  - Will ask psychiatry to see her again regarding ongoing  depressive symptoms, appreciate their assistance.     C. difficile colitis:  Patient was noted to have copious diarrhea on 8/1 and was noted positive for C. Difficile.  * Rectal tube came off 8/28 still with some diarrhea but not severe  - Continue oral vancomycin - till 9/22.  - ID following.     Acute on chronic anemia:  Prior labs show baseline hemoglobin of about 9-10.   * Hb on admission was 11.7, slowly trended down, got 1 unit PRBC on 8/8/22 for Hb 6.9  * Received IV iron infusion x3 with first on 8/11/22.  - Iron panel on 7/30/22 showed that her total iron was low at 14, binding capacity was low at 28, iron saturation was 6 and B12 was high and folate was normal  * No obvious ongoing bleeding noted; Hb has now remained stable around 9 to 10 on 8/26  - Continue to monitor hgb periodically.     History of trigeminal neuralgia  - Continue PTA Trileptal , Keppra and Topamax.     History of chronic pyloric ulcer  She had EGD done in 2020 which showed gastroduodenitis with a duodenal stricture.  - Continue PPI.     Hyperglycemia, resolved  Recent HbA1c was 5.6 on 7/19/22. Was not on any medication prior to admission.  - Continue medium dose sliding scale insulin.  - Requiring no supplemental insulin and no hypoglycemia, glucose checks q8h during the day and at 0200.  - Decreased lantus to 8 units nightly 9/2.  - Blood sugars well controlled.      Intermittent Hypokalemia, Hypo/hypernatremia, Hypophosphatemia:  - Replacement per protocol.     Hypernatremia: resolved.  - Continue free water flushes 200 ml every 4 hrs.     Severe dysphagia  Severe protein calorie malnutrition:  - Nutrition following for tube feeds via G tube (placed 8/16) ; getting free water flushes.  - Limited re-evaluation 9/5 by SLP; recommend continued NPO status.     Physical deconditioning:  - Will need TCU placement; declined by LTACH; continue therapy as able to participate.  - SW following for disposition.       Diet: NPO for  Medical/Clinical Reasons Except for: Other; Specify: NPO For oral intake and gastric feedings for 6 hours post insertion Gastrostomy G/GJ tube. May feed through Jejunal or Distal PORT immediately for GJ tubes ONLY.  Adult Formula Drip Feeding: Continuous Vital 1.5; Gastrostomy; Goal Rate: 50; mL/hr; Medication - Feeding Tube Flush Frequency: At least 15-30 mL water before and after medication administration and with tube clogging; Amount to Send (Nutrition us...    DVT Prophylaxis: Pneumatic Compression Devices  Abrams Catheter: Not present  Central Lines: PRESENT  PICC Triple Lumen 07/22/22 Right Basilic Vascular access-Site Assessment: WDL  Cardiac Monitoring: None  Code Status: Full Code      Disposition Plan      Expected Discharge Date: 09/08/2022    Discharge Delays: Insurance Authorization needed  Placement - TCU  Destination: inpatient rehabilitation facility  Discharge Comments: TCU pending, they may decline. Difficult placement        The patient's care was discussed with the Bedside Nurse and Patient.    Elton Nguyen MD  Hospitalist Service  [facility]  Securely message with the Vocera Web Console (learn more here)  Text page via [employer] Paging/Directory         Clinically Significant Risk Factors Present on Admission                      ______________________________________________________________________    Interval History   Julisa Chaney was seen this morning.  She does not feel very good.  Has a mild headache.  Occasional cough.  No chest pain or nausea.  Remains depressed and frustrated with prolonged hospitalization.    Data reviewed today: I reviewed all medications, new labs and imaging results over the last 24 hours. I personally reviewed no images or EKG's today.    Physical Exam   Vital Signs: Temp: 98  F (36.7  C) Temp src: Axillary BP: 116/62 Pulse: 92   Resp: 16 SpO2: 99 % O2 Device: None (Room air)    Weight: 110 lbs 7.21 oz  Constitutional: awake, alert,  cooperative, no apparent distress, laying in hospital bed, frail  Respiratory: clear to auscultation bilaterally, no crackles or wheezing  Cardiovascular: regular rate and rhythm, normal S1 and S2, no murmur noted  GI: normal bowel sounds, soft, non-distended, non-tender, left upper quadrant PEG tube in place  Skin: warm, dry  Musculoskeletal: no lower extremity pitting edema present  Neurologic: awake, alert, answers questions appropriately, moves all extremities    Data   Recent Labs   Lab 09/07/22  0845 09/07/22  0522 09/06/22  2119 09/06/22  1638 09/06/22  1302 09/06/22  1030 09/05/22 2021 09/05/22  1514 09/05/22  1240 09/05/22  0600   WBC  --   --   --   --   --   --   --  10.8  --   --    HGB  --   --   --   --   --   --   --  10.5*  --   --    MCV  --   --   --   --   --   --   --  85  --   --    PLT  --   --   --   --   --   --   --  353  --   --    NA  --   --   --   --   --   --   --   --   --  138   POTASSIUM  --  4.0  --   --   --  3.7  --   --   --  3.6   CHLORIDE  --   --   --   --   --   --   --   --   --  107   CO2  --   --   --   --   --   --   --   --   --  28   BUN  --   --   --   --   --   --   --   --   --  15   CR  --   --   --   --   --   --   --   --   --  0.44*   ANIONGAP  --   --   --   --   --   --   --   --   --  3   ADY  --   --   --   --   --   --   --   --   --  9.0   *  --  85 104*   < >  --    < >  --    < > 117*    < > = values in this interval not displayed.     Medications     - MEDICATION INSTRUCTIONS -         banatrol plus  1 packet Per Feeding Tube TID w/meals     insulin aspart  1-6 Units Subcutaneous Q8H     insulin glargine  8 Units Subcutaneous At Bedtime     levETIRAcetam  1,000 mg Oral or Feeding Tube Q12H     meropenem  2 g Intravenous Q8H     miconazole with skin protectant   Topical BID     multivitamins w/minerals  15 mL Per Feeding Tube Daily     OXcarbazepine  450 mg Oral or Feeding Tube At Bedtime     pantoprazole  40 mg Oral or Feeding Tube QAM AC      QUEtiapine  50 mg Oral At Bedtime     sodium chloride (PF)  10-40 mL Intracatheter Q8H     sodium chloride (PF)  10-40 mL Intracatheter Q7 Days     topiramate  50 mg Oral or Feeding Tube At Bedtime     vancomycin  125 mg Oral or Feeding Tube 4x Daily     vancomycin  1,000 mg Intravenous Q18H

## 2022-09-07 NOTE — PLAN OF CARE
Reason for Admission: cerebral abscess/ c-diff    Cognitive/Mentation: A/Ox 2, disoriented to time and situation  Neuros/CMS: Intact ex generalized weakness, does not follow commands  VS: stable.   Tele: SR.  GI: BS active, last BM 9/7/2022. Incontinent.  : purewick put in at 0500. Patient denied before then.  Pulmonary: LS clear.  Pain: denies.     Drains/Lines: R PICC  Skin: redness on coccyx and groin. Barrier cream applied  Activity: Assist x 2 with lift.  Diet: NPO with PEG feeds running at 50mL/hr with Q4 200mL flush. Takes pills crushed in tube.     Therapies recs: TCU  Discharge: pending    Aggression Stoplight Tool: green    End of shift summary: Family possibly thinking about palliative care. Blood sugar checks discontinued as well as neuros. Only Q8 vitals. According to family patient also seems depressed, psych to see her for this.

## 2022-09-08 LAB
GLUCOSE BLDC GLUCOMTR-MCNC: 120 MG/DL (ref 70–99)
GLUCOSE BLDC GLUCOMTR-MCNC: 132 MG/DL (ref 70–99)
GLUCOSE BLDC GLUCOMTR-MCNC: 135 MG/DL (ref 70–99)
GLUCOSE BLDC GLUCOMTR-MCNC: 139 MG/DL (ref 70–99)
PHOSPHATE SERPL-MCNC: 3 MG/DL (ref 2.5–4.5)
POTASSIUM BLD-SCNC: 3.9 MMOL/L (ref 3.4–5.3)

## 2022-09-08 PROCEDURE — 99232 SBSQ HOSP IP/OBS MODERATE 35: CPT | Performed by: INTERNAL MEDICINE

## 2022-09-08 PROCEDURE — 258N000003 HC RX IP 258 OP 636: Performed by: HOSPITALIST

## 2022-09-08 PROCEDURE — 250N000013 HC RX MED GY IP 250 OP 250 PS 637: Performed by: HOSPITALIST

## 2022-09-08 PROCEDURE — 120N000001 HC R&B MED SURG/OB

## 2022-09-08 PROCEDURE — 84100 ASSAY OF PHOSPHORUS: CPT | Performed by: INTERNAL MEDICINE

## 2022-09-08 PROCEDURE — 250N000013 HC RX MED GY IP 250 OP 250 PS 637: Performed by: INTERNAL MEDICINE

## 2022-09-08 PROCEDURE — 250N000013 HC RX MED GY IP 250 OP 250 PS 637

## 2022-09-08 PROCEDURE — 250N000011 HC RX IP 250 OP 636

## 2022-09-08 PROCEDURE — 258N000003 HC RX IP 258 OP 636

## 2022-09-08 PROCEDURE — 84132 ASSAY OF SERUM POTASSIUM: CPT | Performed by: INTERNAL MEDICINE

## 2022-09-08 PROCEDURE — 250N000011 HC RX IP 250 OP 636: Performed by: HOSPITALIST

## 2022-09-08 PROCEDURE — 999N000190 HC STATISTIC VAT ROUNDS

## 2022-09-08 RX ORDER — MIRTAZAPINE 7.5 MG/1
7.5 TABLET, FILM COATED ORAL AT BEDTIME
Status: DISCONTINUED | OUTPATIENT
Start: 2022-09-08 | End: 2022-09-12

## 2022-09-08 RX ADMIN — VANCOMYCIN HYDROCHLORIDE 125 MG: KIT at 13:41

## 2022-09-08 RX ADMIN — QUETIAPINE FUMARATE 50 MG: 50 TABLET ORAL at 22:29

## 2022-09-08 RX ADMIN — MICONAZOLE NITRATE: 20 CREAM TOPICAL at 20:39

## 2022-09-08 RX ADMIN — LEVETIRACETAM 1000 MG: 100 SOLUTION ORAL at 08:21

## 2022-09-08 RX ADMIN — ACETAMINOPHEN 650 MG: 325 SUSPENSION ORAL at 08:21

## 2022-09-08 RX ADMIN — ALTEPLASE 2 MG: 2.2 INJECTION, POWDER, LYOPHILIZED, FOR SOLUTION INTRAVENOUS at 05:31

## 2022-09-08 RX ADMIN — VANCOMYCIN HYDROCHLORIDE 125 MG: KIT at 22:29

## 2022-09-08 RX ADMIN — LEVETIRACETAM 1000 MG: 100 SOLUTION ORAL at 20:38

## 2022-09-08 RX ADMIN — MEROPENEM 2 G: 1 INJECTION, POWDER, FOR SOLUTION INTRAVENOUS at 06:37

## 2022-09-08 RX ADMIN — Medication 1 PACKET: at 18:58

## 2022-09-08 RX ADMIN — Medication 40 MG: at 08:20

## 2022-09-08 RX ADMIN — ACETAMINOPHEN 650 MG: 325 SUSPENSION ORAL at 22:29

## 2022-09-08 RX ADMIN — MEROPENEM 2 G: 1 INJECTION, POWDER, FOR SOLUTION INTRAVENOUS at 20:38

## 2022-09-08 RX ADMIN — MIRTAZAPINE 7.5 MG: 7.5 TABLET, FILM COATED ORAL at 22:29

## 2022-09-08 RX ADMIN — Medication 15 ML: at 08:21

## 2022-09-08 RX ADMIN — TOPIRAMATE 50 MG: 50 TABLET ORAL at 22:29

## 2022-09-08 RX ADMIN — VANCOMYCIN HYDROCHLORIDE 125 MG: KIT at 18:58

## 2022-09-08 RX ADMIN — OXCARBAZEPINE 450 MG: 300 SUSPENSION ORAL at 22:29

## 2022-09-08 RX ADMIN — MEROPENEM 2 G: 1 INJECTION, POWDER, FOR SOLUTION INTRAVENOUS at 15:14

## 2022-09-08 RX ADMIN — ACETAMINOPHEN 650 MG: 325 SUSPENSION ORAL at 12:14

## 2022-09-08 RX ADMIN — MICONAZOLE NITRATE: 20 CREAM TOPICAL at 08:21

## 2022-09-08 RX ADMIN — ACETAMINOPHEN 650 MG: 325 SUSPENSION ORAL at 04:18

## 2022-09-08 RX ADMIN — Medication 1 PACKET: at 12:15

## 2022-09-08 RX ADMIN — VANCOMYCIN HYDROCHLORIDE 125 MG: KIT at 08:21

## 2022-09-08 RX ADMIN — ACETAMINOPHEN 650 MG: 325 SUSPENSION ORAL at 18:58

## 2022-09-08 RX ADMIN — INSULIN GLARGINE 8 UNITS: 100 INJECTION, SOLUTION SUBCUTANEOUS at 22:31

## 2022-09-08 RX ADMIN — VANCOMYCIN HYDROCHLORIDE 1250 MG: 10 INJECTION, POWDER, LYOPHILIZED, FOR SOLUTION INTRAVENOUS at 12:15

## 2022-09-08 ASSESSMENT — ACTIVITIES OF DAILY LIVING (ADL)
ADLS_ACUITY_SCORE: 44
ADLS_ACUITY_SCORE: 40
ADLS_ACUITY_SCORE: 44
ADLS_ACUITY_SCORE: 40
ADLS_ACUITY_SCORE: 40
ADLS_ACUITY_SCORE: 44
ADLS_ACUITY_SCORE: 44
ADLS_ACUITY_SCORE: 40
ADLS_ACUITY_SCORE: 40

## 2022-09-08 NOTE — PLAN OF CARE
Reason for Admission: L brain abscess, sepsis, encephalopathy  Cognitive/Mentation: A/Ox 2, disoriented to time and situation.   Neuros/CMS: Stable with generalized weakness. Following minimal commands. Spontaneously moving all extremities.   VS: VSS on RA  Tele: NSR  GI: BS audible, + flatus, last BM 9/7/22. Incontinent.  : Purewick in place, good output. Incontinent.  Pulmonary: LS diminished  Pain: HA and body pain, prn Tylenol and Ibuprofen given.   Drains/Lines: R triple lumen picc- no blood return from any of the ports this morning, alteplase ordered and given. PEG tube infusing TF.   Skin: Intact, redness in perineum area- skin barrier applied. Mepilex in place.   Activity: Assist x 2 with lift device.  Diet: NPO diet with tube feedings running at goal of 50 mL/hr with q4hr 200 mL flushes. Takes pills crushed in peg tube.    Therapies recs: TCU  Discharge: Pending  Aggression Stoplight Tool: Green  End of shift summary: No changes this shift. MRSA/C-diff precautions maintained. Family possibly thinking about palliative care.

## 2022-09-08 NOTE — PROGRESS NOTES
[facility]    Medicine Progress Note - Hospitalist Service    Date of Admission:  7/27/2022    Assessment & Plan        Julisa Chaney is a 77 year old female admitted on 7/27/2022.  PMH of trigeminal neuralgia who was recently admitted to [facility] on 7/19 for severe sepsis due to COVID-19 infection and suspected bacterial pneumonia. She was treated with 5 days of remdesivir and did not require any steroids, also treated with IV vancomycin and IV meropenem.     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  She was switched to IV vancomycin, cefepime and metronidazole and transferred to [facility] for neurosurgery assessment. MRI brain w/wo contrast 7/30 showed ventriculitis with probable abscess. Neurosurgery/neurology/infectious disease/oncology consulted. Patient taken to the OR on 7/31 for left frontal ventriculostomy.   Since then has remained on broad spectrum antibiotics.  In addition she has developed C. Difficile colitis and is on treatment.  She has continued to have a significant encephalopathy which is very slowly improving.     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  * flow cytometry did not show any evidence of malignancy.  * s/p Left frontal ventriculostomy on 7/31/2022, EVD removed 8/8; CSF cultures have remained negative; CSF counts decreasing 469---233 ---56 (last CSF from 8/18)  * Head CT from 8/19 with stable presumed vasogenic edema in the white matter of the anterior inferior left frontal lobe related to ventriculitis of the left lateral ventricle; stable ventriculomegaly  of the left lateral ventricle   * Has been having persistent headaches; reevaluated by neurosurgery 8/27 and recommended head CT, done on 8/29-->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 The left temporal horn remains slightly dilated but no samuel hydrocephalus. No acute changes.  - Remains afebrile; leukocytosis resolved ; was initially on vancomycin, cefepime, and metronidazole, then Meropenem and Vancomycin; ID signed off, plan for 6 weeks treatment until 9/8/22.  - Continue scheduled Tylenol 650 mg TID. PRN ibuprofen ordered.  - Trying to avoid any narcotics with her confusion/ encephalopathy.  - PT/OT consulted, however patient does not put much effort into therapies currently and PT/OT signing off for now.  - Progress has been slow and fairly minimal. Concern about overall prognosis. Briefly discussed palliative care with patient's daughter on 9/6/22. She requested re-evaluation from psychiatry regarding depression, will be seen by psych today, will consider discussion about palliative care pending psychiatry recommendations.     Acute infectious encephalopathy due to above:  * mentation much improved since admission ; fluctuating at times ; will not engage in conversation most of the times.  * 8/23 discontinued prn benadryl, narcotics; minimize narcotic.  - Continue to treat underlying issues as noted above.   - Maintain normal sleep/wake cycle as able.  - Use minimal single dosing of narcotic if absolutely needed for pain, no standing order.     Suicidal throughs/agitation  Depression  * With improvement in her mentation, she has started becoming at times restless and agitated. On 8/27, reported having suicidal thoughts. No plans/attempts.  * initiated suicide precautions and sitter, evaluated by psychiatry service, discontinued.   - Reassess periodically for ongoing need.  - Anti depressant not recommended currently in the setting of delirium/encephalopathy.  - HS seroquel increased  8/31.  - Will ask psychiatry to see her again regarding ongoing depressive symptoms, appreciate their assistance.     C. difficile colitis  Patient was noted to have copious diarrhea on 8/1 and was noted positive for C. Difficile.  * Rectal tube came off 8/28 still with some diarrhea but not severe  - Continue oral vancomycin - till 9/22.  - ID following.     Acute on chronic anemia  Prior labs show baseline hemoglobin of about 9-10.   * Hb on admission was 11.7, slowly trended down, got 1 unit PRBC on 8/8/22 for Hb 6.9  * Received IV iron infusion x3 with first on 8/11/22.  - Iron panel on 7/30/22 showed that her total iron was low at 14, binding capacity was low at 28, iron saturation was 6 and B12 was high and folate was normal  * No obvious ongoing bleeding noted; Hb has now remained stable around 9 to 10 on 8/26  - Continue to monitor hgb periodically.     History of trigeminal neuralgia  - Continue PTA Trileptal , Keppra and Topamax.     History of chronic pyloric ulcer  She had EGD done in 2020 which showed gastroduodenitis with a duodenal stricture.  - Continue PPI.     Hyperglycemia, resolved  Recent HbA1c was 5.6 on 7/19/22. Was not on any medication prior to admission.  - Continue medium dose sliding scale insulin.  - Requiring no supplemental insulin and no hypoglycemia, glucose checks q8h during the day and at 0200.  - Decreased lantus to 8 units nightly 9/2.  - Blood sugars well controlled.      Intermittent Hypokalemia, Hypo/hypernatremia, Hypophosphatemia  - Replacement per protocol.     Hypernatremia: resolved.  - Continue free water flushes 200 ml every 4 hrs.     Severe dysphagia  Severe protein calorie malnutrition  - Nutrition following for tube feeds via G tube (placed 8/16) ; getting free water flushes.  - Limited re-evaluation 9/5 by SLP; recommend continued NPO status.     Physical deconditioning  - Will need TCU placement; declined by LTACH; continue therapy as able to participate.  - [facility]  following for disposition.       Diet: NPO for Medical/Clinical Reasons Except for: Other; Specify: NPO For oral intake and gastric feedings for 6 hours post insertion Gastrostomy G/GJ tube. May feed through Jejunal or Distal PORT immediately for GJ tubes ONLY.  Adult Formula Drip Feeding: Continuous Vital 1.5; Gastrostomy; Goal Rate: 50; mL/hr; Medication - Feeding Tube Flush Frequency: At least 15-30 mL water before and after medication administration and with tube clogging; Amount to Send (Nutrition us...    DVT Prophylaxis: Pneumatic Compression Devices  Abrams Catheter: Not present  Central Lines: PRESENT  PICC Triple Lumen 07/22/22 Right Basilic Vascular access-Site Assessment: WDL  Cardiac Monitoring: None  Code Status: Full Code      Disposition Plan      Expected Discharge Date: 09/09/2022    Discharge Delays: Insurance Authorization needed  Placement - TCU  Specialist Consult (enter specialist & decision needed in comments)  Destination: inpatient rehabilitation facility  Discharge Comments: TCU pending, they may decline. Difficult placement        The patient's care was discussed with the Bedside Nurse and Patient.    Elton Nguyen MD  Hospitalist Service  [facility]  Securely message with the Vocera Web Console (learn more here)  Text page via [employer] Paging/Directory         Clinically Significant Risk Factors Present on Admission                      ______________________________________________________________________    Interval History   Julisa Chaney was seen earlier today. She doesn't feel very good. Having some loose stools. Denies abdominal pain and nausea. No fevers, chest pain, shortness of breath. Has not been participating in therapies. Not eating much. Feels depressed.    Data reviewed today: I reviewed all medications, new labs and imaging results over the last 24 hours. I personally reviewed no images or EKG's today.    Physical Exam   Vital Signs: Temp:  97.8  F (36.6  C) Temp src: Axillary BP: 132/60 Pulse: 85   Resp: 16 SpO2: 98 % O2 Device: None (Room air)    Weight: 110 lbs 7.21 oz  Constitutional: awake, alert, cooperative, no apparent distress, laying in the hospital bed, frail, appears depressed  Respiratory: clear to auscultation bilaterally, no crackles or wheezing  Cardiovascular: regular rate and rhythm, normal S1 and S2, no murmur noted  GI: normal bowel sounds, soft, non-distended, non-tender  Skin: warm, dry  Musculoskeletal: no lower extremity pitting edema present  Neurologic: awake, alert, answers questions appropriately, moves all extremities    Data   Recent Labs   Lab 09/08/22  1324 09/08/22  0637 09/08/22  0529 09/08/22  0417 09/07/22  0845 09/07/22  0522 09/06/22  1302 09/06/22  1030 09/05/22  2021 09/05/22  1514 09/05/22  1240 09/05/22  0600   WBC  --   --   --   --   --   --   --   --   --  10.8  --   --    HGB  --   --   --   --   --   --   --   --   --  10.5*  --   --    MCV  --   --   --   --   --   --   --   --   --  85  --   --    PLT  --   --   --   --   --   --   --   --   --  353  --   --    NA  --   --   --   --   --   --   --   --   --   --   --  138   POTASSIUM  --  3.9  --   --   --  4.0  --  3.7  --   --   --  3.6   CHLORIDE  --   --   --   --   --   --   --   --   --   --   --  107   CO2  --   --   --   --   --   --   --   --   --   --   --  28   BUN  --   --   --   --   --   --   --   --   --   --   --  15   CR  --   --   --   --   --   --   --   --   --   --   --  0.44*   ANIONGAP  --   --   --   --   --   --   --   --   --   --   --  3   ADY  --   --   --   --   --   --   --   --   --   --   --  9.0   *  --  139* 120*   < >  --    < >  --    < >  --    < > 117*    < > = values in this interval not displayed.     Medications     - MEDICATION INSTRUCTIONS -         banatrol plus  1 packet Per Feeding Tube TID w/meals     insulin aspart  1-6 Units Subcutaneous Q8H     insulin glargine  8 Units Subcutaneous At Bedtime      levETIRAcetam  1,000 mg Oral or Feeding Tube Q12H     meropenem  2 g Intravenous Q8H     miconazole with skin protectant   Topical BID     multivitamins w/minerals  15 mL Per Feeding Tube Daily     OXcarbazepine  450 mg Oral or Feeding Tube At Bedtime     pantoprazole  40 mg Oral or Feeding Tube QAM AC     QUEtiapine  50 mg Oral At Bedtime     sodium chloride (PF)  10-40 mL Intracatheter Q8H     sodium chloride (PF)  10-40 mL Intracatheter Q7 Days     topiramate  50 mg Oral or Feeding Tube At Bedtime     vancomycin  125 mg Oral or Feeding Tube 4x Daily     vancomycin  1,250 mg Intravenous Q24H

## 2022-09-08 NOTE — PROGRESS NOTES
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 Has been on broad spectrum antibiotics, now on Meropenem/Vancomycin, mental status improved, s/p left frontal ventriculostomy CSF cxs have remained negative. Course complicated by development of C.diff colitis.   Last CT scan: 8/ 29 IMPRESSION:  1.  The previously noted hydrocephalic left lateral ventricle has decreased in size. The left temporal horn remains slightly dilated but no samuel hydrocephalus.  2.  No acute findings.     Recommendations  1. Last day of treatment today with antimicrobial therapy Vancomycin, Meropenem.  6 week treatment will be done today9/8  2. Oral vancomycin 125 mg BID to be continued till 9/22  No other new recommendations will sig off     Cayetano Schmitt MD    Interval History   Remains afebrile   Wants to go home   Complaints of abdominal pain   No diarrhea Labs and vancomycin levels reviewed   Ct scan reviewed  No changes to ROS  No changes to Social history, Family history and medical history     Afebrile   Physical Exam   Temp: 97.8  F (36.6  C) Temp src: Axillary BP: 111/58 Pulse: 84   Resp: 16 SpO2: 98 % O2 Device: None (Room air)    Vitals:    09/04/22 0618 09/05/22 0552 09/07/22 0500   Weight: 50.6 kg (111 lb 8.8 oz) 50.3 kg (110 lb 14.3 oz) 50.1 kg (110 lb 7.2 oz)     Vital Signs with Ranges  Temp:  [96.8  F (36  C)-99.4  F (37.4  C)] 97.8  F (36.6  C)  Pulse:  [] 84  Resp:  [16] 16  BP: (111-125)/(55-64) 111/58  SpO2:  [96 %-98 %] 98 %    Constitutional: awake, lying in the bed   Lungs: Clear to auscultation bilaterally, no crackles or wheezing  Cardiovascular: Regular rate and rhythm, normal S1 and S2, and no murmur noted  Abdomen: soft, non tender in palpation   Skin: No  rash    Other:    Medications     - MEDICATION INSTRUCTIONS -         banatrol plus  1 packet Per Feeding Tube TID w/meals     insulin aspart  1-6 Units Subcutaneous Q8H     insulin glargine  8 Units Subcutaneous At Bedtime     levETIRAcetam  1,000 mg Oral or Feeding Tube Q12H     meropenem  2 g Intravenous Q8H     miconazole with skin protectant   Topical BID     multivitamins w/minerals  15 mL Per Feeding Tube Daily     OXcarbazepine  450 mg Oral or Feeding Tube At Bedtime     pantoprazole  40 mg Oral or Feeding Tube QAM AC     QUEtiapine  50 mg Oral At Bedtime     sodium chloride (PF)  10-40 mL Intracatheter Q8H     sodium chloride (PF)  10-40 mL Intracatheter Q7 Days     topiramate  50 mg Oral or Feeding Tube At Bedtime     vancomycin  125 mg Oral or Feeding Tube 4x Daily     vancomycin  1,250 mg Intravenous Q24H       Data   All microbiology laboratory data reviewed.  Recent Labs   Lab Test 09/05/22  1514 08/26/22  0557 08/23/22  1920   WBC 10.8 6.4 7.4   HGB 10.5* 10.5* 10.2*   HCT 35.5 36.1 35.5   MCV 85 84 84    533* 553*     Recent Labs   Lab Test 09/05/22  0600 08/31/22  0630 08/29/22  0610   CR 0.44* 0.43* 0.41*     Recent Labs   Lab Test 08/08/20  0212   SED 13

## 2022-09-08 NOTE — PLAN OF CARE
Goal Outcome Evaluation: Patient frequently refusing therapies and stated to OT this am to never come back. Currently not participating so will complete orders. If patient willingness to participate changes please reorder.

## 2022-09-08 NOTE — PLAN OF CARE
"Pt continuously refusing therapy services, despite frequent education on benefits of functional mobility and risks of lack of activity. Pt not interested in therapeutic interventions at this time. Pt states, \"leave and never come back\". Due to frequent refusals and limited participation in therapy, will completes orders at this time. RN aware. Please re-consult if anything changes. Thank you.   "

## 2022-09-08 NOTE — CONSULTS
"      Psychiatry Consultation; Follow up              Reason for Consult, requesting source:    Follow up, depression  Requesting source: Timmy Grover Md    Labs and imaging reviewed. Discussed with nursing.                Interim history:    Julisa Chaney is a pleasant 77 year old woman with past medical history that is most notable for trigeminal neuralgia, among others; who was admitted to [facility] on 7/19/2022 for severe sepsis suspected due to COVID infection and suspected bacterial pneumonia. Her course has been complicated by prolonged severe acute metabolic encephalopathy and she is now found to have suspected intracerebral abscess causing acute ventriculitis, she was transferred to [facility] on 7/27 and underwent left frontal ventriculostomy on 7/31. Delirium is ongoing. Psychiatry was consulted for depression. I saw her on 8/31, at that time delirium was pretty significant so I recommended against antidepressant due to risk of worsening delirium. Psychiatry is re-consulted to re-evaluate for possible initiation of antidepressant.    I saw Julisa in her room today, she is seen laying in bed. States she is \"terrible\", complains of pain in her side and being \"full of poop\". She moans and writhes, holding her head intermittently throughout assessment but denies head pain. She thinks she is here for a \"brain aneurysm\", denied having any surgery, \"there's nothing they could do about it\". She is not sure what treatment she is currently receiving, but is aware she has a feeding tube and is getting IV medications. She states \"I just want to die and get it over with\". Denies any suicidal ideation, contracts for safety in the hospital. States she used to be optimistic that she would get better, but now feels quite hopeless. Reports poor sleep, low energy levels, poor ability to concentrate, loss of interest in usual activities such as reading and spending time with family. Describes mood as \"lousy\". " "She also complains of severe, constant anxiety which she rates 10/10. She becomes irritable in assessment, snapping at me \"can't you do something other than talk to me?\".        Current Medications:       banatrol plus  1 packet Per Feeding Tube TID w/meals     insulin aspart  1-6 Units Subcutaneous Q8H     insulin glargine  8 Units Subcutaneous At Bedtime     levETIRAcetam  1,000 mg Oral or Feeding Tube Q12H     meropenem  2 g Intravenous Q8H     miconazole with skin protectant   Topical BID     multivitamins w/minerals  15 mL Per Feeding Tube Daily     OXcarbazepine  450 mg Oral or Feeding Tube At Bedtime     pantoprazole  40 mg Oral or Feeding Tube QAM AC     QUEtiapine  50 mg Oral At Bedtime     sodium chloride (PF)  10-40 mL Intracatheter Q8H     sodium chloride (PF)  10-40 mL Intracatheter Q7 Days     topiramate  50 mg Oral or Feeding Tube At Bedtime     vancomycin  125 mg Oral or Feeding Tube 4x Daily     vancomycin  1,250 mg Intravenous Q24H              MSE:   Appearance: awake, alert and laying in bed, disheveled  Attitude:  mostly cooperative but irritable toward end of assessment  Eye Contact:  poor   Mood:  \"lousy\"  Affect:  mood congruent  Speech:  clear, coherent  Psychomotor Behavior:  no evidence of tardive dyskinesia, dystonia, or tics and laying in bed, periodically writhing in pain  Thought Process:  linear and goal oriented  Associations:  no loose associations  Thought Content:  no evidence of psychotic thought and passive suicidal ideation present  Insight:  partial  Judgement:  poor  Oriented to:  self, place. Not oriented to situation or time  Attention Span and Concentration:  limited  Recent and Remote Memory:  limited        Vital signs:  Temp: 97.8  F (36.6  C) Temp src: Axillary BP: 132/60 Pulse: 85   Resp: 16 SpO2: 98 % O2 Device: None (Room air) Oxygen Delivery: 1 LPM   Weight: 50.1 kg (110 lb 7.2 oz)  Estimated body mass index is 18.38 kg/m  as calculated from the following:    " "Height as of 7/19/22: 1.651 m (5' 5\").    Weight as of this encounter: 50.1 kg (110 lb 7.2 oz).              DSM-5 Diagnosis:   Delirium, ongoing  Major depressive disorder, recurrent episode, severe, with anxious distress          Assessment:   Julisa presents with ongoing delirium with significant symptoms of depression and anxiety. She has passive suicidal ideation but no intent or plan to act on these thoughts. She is hopeless and has not motivation or energy to do anything other than lay in bed. She has been refusing to participate in various therapies such as PT and OT; I suspect that depression could be playing a big role in this. Her delirium is somewhat improved from when I saw her last, but depressive symptoms seem worse. At this point, it may be worth trying an antidepressant despite risk of worsening or prolonging delirium. Could try mirtazapine, which can help with mood, anxiety, and sleep, all of which are problematic for Julisa. Recommend starting at low dose and titrating up based on tolerability.     I called daughter Alissa, she does still think the confusion is ongoing. She states that she doesn't know what farm Julisa has been referring to, states there is no family farm that she is aware of, it's possible there was a farm that she visited as a child but is not sure. She expresses concern about ongoing depression and anxiety, which she reports she was dealing with to a lesser degree prior to hospitalization. Doesn't think she was on mirtazapine for very long and doubts that she even took it consistently, but does think that she took it many years ago and did really well on it. I did explain to her the risk that antidepressants could worsen or prolong delirium, she is agreeable to restarting mirtazapine.          Summary of Recommendations:   1. Start mirtazapine 7.5mg nightly, can increase to 15mg after a few days if tolerating well.    2. Due to passive suicidal ideation, recommend assessing for " "suicidal ideation, intent, and plan at least once per shift. If she endorses active suicidal ideation, intent, or plan, should start 1:1 sitter and reconsult psychiatry.    3. Please reconsult psychiatry as needed.    DANIELLE Bacon [facility]  Consult/Liaison Psychiatry   [facility]    Contact information available via [facility] Paging/Directory  If I am not available, then [facility] line ([phone]) should know who is covering our consult service.   \"Much or all of the text in this note was generated through the use of Dragon Dictate voice to text software. Errors in spelling or words which appear to be out of contact are unintentional, may be present due having escaped editing\"   "

## 2022-09-08 NOTE — PLAN OF CARE
Reason for Admission: Brain Abscess, Metabolic encephalopathy    Cognitive/Mentation: A/Ox 3/ disoriented to time  Neuros/CMS: Intact ex generalized incision  VS: stable.   Tele: NSR.  GI: BS positive,  flatus, last BM 9/8. InContinent.  : . InContinent./ Purewick  Pulmonary: LS clear.  Pain: all over/ tylenol given x 2.     Drains/Lines: right PICC  Skin: pale/ reddened perinium and coccyx  Activity: Assist x 2  .  Diet: NPO / Tube feed.     Therapies recs: LTC  Discharge: Pending placement    Aggression Stoplight Tool: Yellow    End of shift summary: Patient refusing therapy, failure to thrive, will have Psyc consult today

## 2022-09-08 NOTE — PLAN OF CARE
Pt A&Ox 1. VSS on RA. Sad. Lungs clear. Peg tube present, PICC on the right. Taking tylenol  for pain.  Incontinent B&B.Continue to monitor.

## 2022-09-09 LAB
GLUCOSE BLDC GLUCOMTR-MCNC: 101 MG/DL (ref 70–99)
GLUCOSE BLDC GLUCOMTR-MCNC: 106 MG/DL (ref 70–99)
GLUCOSE BLDC GLUCOMTR-MCNC: 110 MG/DL (ref 70–99)
GLUCOSE BLDC GLUCOMTR-MCNC: 141 MG/DL (ref 70–99)
PHOSPHATE SERPL-MCNC: 2.5 MG/DL (ref 2.5–4.5)
POTASSIUM BLD-SCNC: 4 MMOL/L (ref 3.4–5.3)

## 2022-09-09 PROCEDURE — 250N000013 HC RX MED GY IP 250 OP 250 PS 637: Performed by: HOSPITALIST

## 2022-09-09 PROCEDURE — 999N000040 HC STATISTIC CONSULT NO CHARGE VASC ACCESS

## 2022-09-09 PROCEDURE — 250N000013 HC RX MED GY IP 250 OP 250 PS 637: Performed by: INTERNAL MEDICINE

## 2022-09-09 PROCEDURE — 120N000001 HC R&B MED SURG/OB

## 2022-09-09 PROCEDURE — 84132 ASSAY OF SERUM POTASSIUM: CPT | Performed by: HOSPITALIST

## 2022-09-09 PROCEDURE — 250N000013 HC RX MED GY IP 250 OP 250 PS 637

## 2022-09-09 PROCEDURE — 84100 ASSAY OF PHOSPHORUS: CPT | Performed by: HOSPITALIST

## 2022-09-09 RX ADMIN — VANCOMYCIN HYDROCHLORIDE 125 MG: KIT at 09:32

## 2022-09-09 RX ADMIN — QUETIAPINE FUMARATE 50 MG: 50 TABLET ORAL at 21:28

## 2022-09-09 RX ADMIN — Medication 1 PACKET: at 17:10

## 2022-09-09 RX ADMIN — Medication 1 PACKET: at 14:00

## 2022-09-09 RX ADMIN — Medication 15 ML: at 09:32

## 2022-09-09 RX ADMIN — TOPIRAMATE 50 MG: 50 TABLET ORAL at 21:28

## 2022-09-09 RX ADMIN — Medication 40 MG: at 09:32

## 2022-09-09 RX ADMIN — MICONAZOLE NITRATE: 20 CREAM TOPICAL at 21:27

## 2022-09-09 RX ADMIN — INSULIN GLARGINE 8 UNITS: 100 INJECTION, SOLUTION SUBCUTANEOUS at 22:16

## 2022-09-09 RX ADMIN — VANCOMYCIN HYDROCHLORIDE 125 MG: KIT at 21:28

## 2022-09-09 RX ADMIN — MICONAZOLE NITRATE: 20 CREAM TOPICAL at 09:36

## 2022-09-09 RX ADMIN — LEVETIRACETAM 1000 MG: 100 SOLUTION ORAL at 21:28

## 2022-09-09 RX ADMIN — ACETAMINOPHEN 650 MG: 325 SUSPENSION ORAL at 22:16

## 2022-09-09 RX ADMIN — OXCARBAZEPINE 450 MG: 300 SUSPENSION ORAL at 21:28

## 2022-09-09 RX ADMIN — MIRTAZAPINE 7.5 MG: 7.5 TABLET, FILM COATED ORAL at 21:28

## 2022-09-09 RX ADMIN — ACETAMINOPHEN 650 MG: 325 SUSPENSION ORAL at 17:22

## 2022-09-09 RX ADMIN — LEVETIRACETAM 1000 MG: 100 SOLUTION ORAL at 09:32

## 2022-09-09 RX ADMIN — VANCOMYCIN HYDROCHLORIDE 125 MG: KIT at 13:59

## 2022-09-09 RX ADMIN — VANCOMYCIN HYDROCHLORIDE 125 MG: KIT at 17:10

## 2022-09-09 ASSESSMENT — ACTIVITIES OF DAILY LIVING (ADL)
ADLS_ACUITY_SCORE: 48
ADLS_ACUITY_SCORE: 44
ADLS_ACUITY_SCORE: 48
ADLS_ACUITY_SCORE: 44
ADLS_ACUITY_SCORE: 48
ADLS_ACUITY_SCORE: 48
ADLS_ACUITY_SCORE: 44
ADLS_ACUITY_SCORE: 44
ADLS_ACUITY_SCORE: 48
ADLS_ACUITY_SCORE: 44

## 2022-09-09 NOTE — PLAN OF CARE
Goal Outcome Evaluation:          Overall Patient Progress: no change    Outcome Evaluation: NPO.  TF continues at goal.  Still with loose stools.  Note that Meropenem finished yesterday - on Vanco until 9/22.  Continue with semi-elemental formula and soluble fiber supplementation  - Vital 1.5 @ 50 mL/hr + 1 packet Banatrol TID.       ambulatory

## 2022-09-09 NOTE — PROGRESS NOTES
CLINICAL NUTRITION SERVICES - REASSESSMENT NOTE      Future/Additional Recommendations:     Continue with semi-elemental formula and soluble fiber supplementation  - Vital 1.5 @ 50 mL/hr + 1 packet Banatrol TID       Malnutrition: ()  % Weight Loss: > 10% in 6 months (severe malnutrition) - per  note  % Intake:  No decreased intake noted - pt meeting needs from EN  Subcutaneous Fat Loss:  Orbital region moderate depletion - per  note  Muscle Loss:  Temporal region moderate depletion, Clavicle bone region moderate depletion, Acromion bone region moderate-severe depletion and Dorsal hand region severe depletion - per  note  Fluid Retention:  None noted     Malnutrition Diagnosis: Severe malnutrition  In Context of:  Acute illness or injury  Chronic illness or disease       EVALUATION OF PROGRESS TOWARD GOALS   Diet:    NPO    Nutrition Support:    Type of Feeding Tube: 18 Fr PEG (placed )  Enteral Frequency:  Continuous  Enteral Regimen: Vital 1.5 @ 50 mL/hr (semi-elemental formula)  Enteral Provisions: 1800 cals, 82 gm pro (1.7 gm/kg), 7 gm fiber, 917 mL free water, 224 gm CHO  1 packet Banatrol TID = 120 cals, 6 gm fiber  Free Water Flush: 200 mL every 4 hrs ( - MD order)     Total provisions = 1920 kcal (40 kcal/kg), 13 g fiber, 2117 mL free water   Liquid MVI/M daily     Intake/Tolerance:    Chart reviewed    [place]/1: cdiff positive  Pt has been on vanco - will continue until : BM x10  : BM x5  : BM x4    Note that pt finished taking Meropenem  (diarrhea is a side effect)      ASSESSED NUTRITION NEEDS:  Dosing Weight 48.3 kg ( - admit wt)  Estimated Energy Needs: 3136-8377 kcals (35-40 Kcal/Kg)  Justification: repletion and underweight  Estimated Protein Needs: 70-95 grams protein (1.5-2 g pro/Kg)  Justification: repletion       NEW FINDINGS:     22 0500 50.1 kg (110 lb 7.2 oz) Bed scale   22 0552 50.3 kg (110 lb 14.3 oz) Bed scale   22  0618 50.6 kg (111 lb 8.8 oz) Bed scale     07/28/22 0600 49.5 kg (109 lb 2 oz) Bed scale   07/28/22 0000 -- Bed scale   07/27/22 2100 48.3 kg (106 lb 7.7 oz) Bed scale           Previous Goals (9/6):   EN to meet % estimated needs  Evaluation: Met    BM <3x/day + bulking   Evaluation: Not met    Previous Nutrition Diagnosis (9/6):   Altered GI function related to ongoing diarrhea from prior CDiff infection and/or unknown etiology as evidenced by loose BMs x3-8 per day w/o improvement from nutritional interventions   Evaluation: No change          CURRENT NUTRITION DIAGNOSIS  Altered GI function related to ongoing diarrhea from prior CDiff infection, medication side effects and/or unknown etiology as evidenced by loose BMs x4-10 per day w/o improvement from nutritional interventions (semi-elemental formula and Banatrol soluble fiber supplement)    INTERVENTIONS  Recommendations / Nutrition Prescription  NPO    Continue with semi-elemental formula and soluble fiber supplementation  - Vital 1.5 @ 50 mL/hr + 1 packet Banatrol TID        Goals  EN to meet % estimated needs  Stooling to decrease to <3 BM perday      MONITORING AND EVALUATION:  Progress towards goals will be monitored and evaluated per protocol and Practice Guidelines

## 2022-09-09 NOTE — PLAN OF CARE
Reason for Admission: cerebral abscess/ c-diff     Cognitive/Mentation: A/Ox 3, disoriented to time   Neuros/CMS: Intact ex generalized weakness, does not follow commands  VS: stable.   Tele: SR.  GI: BS active, last BM 9/8/2022. Incontinent.  : purewick   Pulmonary: LS clear.  Pain: denies.      Drains/Lines: R PICC  Skin: redness on coccyx and groin. Activity: Assist x 2 with lift.  Diet: NPO with PEG feeds running at 50mL/hr with Q4 200mL flush. Takes pills crushed in tube.      Therapies recs: TCU  Discharge: pending     Aggression Stoplight Tool: green     End of shift summary: Family possibly thinking about palliative care. Q8 vitals. According to family patient also seems depressed. Psych saw patient but not starting any antidepressants due to risk of worsening delirium.

## 2022-09-09 NOTE — PLAN OF CARE
[id number]    Reason for Admission: Cerebral abscess    Cognitive/Mentation: A/Ox 3 d/o time  CMS: Intact ex generalized weakness  VS: stable.  GI: BS active, + flatus, last BM 9/9/22. InContinent.  : voiding. InContinent.  Pulmonary: LS clear.  Pain: yes. Gave tylenol.     Drains/Lines:  R PICC, PEG  Skin: intact ex redness in perineum and buttock  Activity: Assist x 2 with lift.  Diet: NPO. TF @ 50 mL/hr. FWF 200mL q 4 hours    Therapies recs: TCU vs TLC  Discharge: pending    Aggression Stoplight Tool: green    End of shift summary: Patient stable throughout shift.

## 2022-09-09 NOTE — PROGRESS NOTES
[facility]    Medicine Progress Note - Hospitalist Service    Date of Admission:  7/27/2022    Assessment & Plan        Julisa Chaney is a 77 year old female admitted on 7/27/2022.  PMH of trigeminal neuralgia who was recently admitted to [facility] on 7/19 for severe sepsis due to COVID-19 infection and suspected bacterial pneumonia. She was treated with 5 days of remdesivir and did not require any steroids, also treated with IV vancomycin and IV meropenem.     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  She was switched to IV vancomycin, cefepime and metronidazole and transferred to [facility] for neurosurgery assessment. MRI brain w/wo contrast 7/30 showed ventriculitis with probable abscess. Neurosurgery/neurology/infectious disease/oncology consulted. Patient taken to the OR on 7/31 for left frontal ventriculostomy.   Since then has remained on broad spectrum antibiotics.  In addition she has developed C. Difficile colitis and is on treatment.  She has continued to have a significant encephalopathy which is very slowly improving.     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  * flow cytometry did not show any evidence of malignancy.  * s/p Left frontal ventriculostomy on 7/31/2022, EVD removed 8/8; CSF cultures have remained negative; CSF counts decreasing 469---233 ---56 (last CSF from 8/18)  * Head CT from 8/19 with stable presumed vasogenic edema in the white matter of the anterior inferior left frontal lobe related to ventriculitis of the left lateral ventricle; stable ventriculomegaly  of the left lateral ventricle   * Has been having persistent headaches; reevaluated by neurosurgery 8/27 and recommended head CT, done on 8/29-->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 The left temporal horn remains slightly dilated but no samuel hydrocephalus. No acute changes.  - Remains afebrile; leukocytosis resolved ; was initially on vancomycin, cefepime, and metronidazole, then Meropenem and Vancomycin; ID signed off, plan for 6 weeks treatment until 9/8/22.  - Continue scheduled Tylenol 650 mg TID. PRN ibuprofen ordered.  - Trying to avoid any narcotics with her confusion/ encephalopathy.  - PT/OT consulted, however patient does not put much effort into therapies currently and PT/OT signing off for now.  - Progress has been slow and fairly minimal. Concern about overall prognosis. Briefly discussed palliative care with patient's daughter on 9/6/22. She requested re-evaluation from psychiatry regarding depression (completed 9/8/22, see consult note). Seems to want to see how she does with the medication prior to having any discussion about palliative care. Tried to call daughter Alissa today (9/8/22), but just got voicemail.     Acute infectious encephalopathy due to above:  * mentation much improved since admission ; fluctuating at times ; will not engage in conversation most of the times.  * 8/23 discontinued prn benadryl, narcotics; minimize narcotic.  - Continue to treat underlying issues as noted above.   - Maintain normal sleep/wake cycle as able.  - Use minimal single dosing of narcotic if absolutely needed for pain, no standing order.     Suicidal throughs/agitation  Depression  * With improvement in her mentation, she has started becoming at times restless and agitated. On 8/27, reported having suicidal thoughts. No plans/attempts.  * initiated suicide precautions and sitter, evaluated by psychiatry service, discontinued.   - Reassess periodically for ongoing need.  - Anti  depressant not recommended currently in the setting of delirium/encephalopathy.  - HS seroquel increased 8/31.  - Psych reconsulted on 9/8/22, restarted remeron (see consult note).     C. difficile colitis  Patient was noted to have copious diarrhea on 8/1 and was noted positive for C. Difficile.  * Rectal tube came off 8/28 still with some diarrhea but not severe  - Continue oral vancomycin - till 9/22.  - ID following.     Acute on chronic anemia  Prior labs show baseline hemoglobin of about 9-10.   * Hb on admission was 11.7, slowly trended down, got 1 unit PRBC on 8/8/22 for Hb 6.9  * Received IV iron infusion x3 with first on 8/11/22.  - Iron panel on 7/30/22 showed that her total iron was low at 14, binding capacity was low at 28, iron saturation was 6 and B12 was high and folate was normal  * No obvious ongoing bleeding noted; Hb has now remained stable around 9 to 10 on 8/26  - Continue to monitor hgb periodically.     History of trigeminal neuralgia  - Continue PTA Trileptal , Keppra and Topamax.     History of chronic pyloric ulcer  She had EGD done in 2020 which showed gastroduodenitis with a duodenal stricture.  - Continue PPI.     Hyperglycemia, resolved  Recent HbA1c was 5.6 on 7/19/22. Was not on any medication prior to admission.  - Continue medium dose sliding scale insulin.  - Requiring no supplemental insulin and no hypoglycemia, glucose checks q8h during the day and at 0200.  - Decreased lantus to 8 units nightly 9/2.  - Blood sugars well controlled.      Intermittent Hypokalemia, Hypo/hypernatremia, Hypophosphatemia  - Replacement per protocol.     Hypernatremia: resolved.  - Continue free water flushes 200 ml every 4 hrs.     Severe dysphagia  Severe protein calorie malnutrition  - Nutrition following for tube feeds via G tube (placed 8/16) ; getting free water flushes.  - Limited re-evaluation 9/5 by SLP; recommend continued NPO status.     Physical deconditioning  - Will need TCU placement;  declined by LTACH.  - PT/OT signed off due to lack of patient participation.  -  following for disposition.       Diet: NPO for Medical/Clinical Reasons Except for: Other; Specify: NPO For oral intake and gastric feedings for 6 hours post insertion Gastrostomy G/GJ tube. May feed through Jejunal or Distal PORT immediately for GJ tubes ONLY.  Adult Formula Drip Feeding: Continuous Vital 1.5; Gastrostomy; Goal Rate: 50; mL/hr; Medication - Feeding Tube Flush Frequency: At least 15-30 mL water before and after medication administration and with tube clogging; Amount to Send (Nutrition us...    DVT Prophylaxis: Pneumatic Compression Devices  Abrams Catheter: Not present  Central Lines: PRESENT  PICC Triple Lumen 07/22/22 Right Basilic Vascular access-Site Assessment: WDL  Cardiac Monitoring: None  Code Status: Full Code      Disposition Plan         The patient's care was discussed with the Bedside Nurse and Patient.    Elton Nguyen MD  Hospitalist Service  [facility]  Securely message with the Vocera Web Console (learn more here)  Text page via [employer] Paging/Directory         Clinically Significant Risk Factors Present on Admission                      ______________________________________________________________________    Interval History   Julisa Chaney was seen this morning. States she just woke up. No specific complaints/concerns for me this morning. Denies pain, shortness of breath, nausea, abdominal pain.    Data reviewed today: I reviewed all medications, new labs and imaging results over the last 24 hours. I personally reviewed no images or EKG's today.    Physical Exam   Vital Signs: Temp: 98.2  F (36.8  C) Temp src: Axillary BP: 127/64 Pulse: 89   Resp: 16 SpO2: 97 % O2 Device: None (Room air)    Weight: 110 lbs 7.21 oz  Constitutional: awake, alert, cooperative, no apparent distress, laying in the hospital bed, frail, appears depressed  Respiratory: clear to auscultation  bilaterally, no crackles or wheezing  Cardiovascular: regular rate and rhythm, normal S1 and S2, no murmur noted  GI: normal bowel sounds, soft, non-distended, non-tender, PEG tube in place  Skin: warm, dry  Musculoskeletal: no lower extremity pitting edema present  Neurologic: awake, alert, answers questions appropriately, moves all extremities    Data   Recent Labs   Lab 09/09/22  0511 09/09/22  0452 09/08/22 2056 09/08/22  1324 09/08/22  0637 09/07/22  0845 09/07/22  0522 09/05/22 2021 09/05/22  1514 09/05/22  1240 09/05/22  0600   WBC  --   --   --   --   --   --   --   --  10.8  --   --    HGB  --   --   --   --   --   --   --   --  10.5*  --   --    MCV  --   --   --   --   --   --   --   --  85  --   --    PLT  --   --   --   --   --   --   --   --  353  --   --    NA  --   --   --   --   --   --   --   --   --   --  138   POTASSIUM 4.0  --   --   --  3.9  --  4.0   < >  --   --  3.6   CHLORIDE  --   --   --   --   --   --   --   --   --   --  107   CO2  --   --   --   --   --   --   --   --   --   --  28   BUN  --   --   --   --   --   --   --   --   --   --  15   CR  --   --   --   --   --   --   --   --   --   --  0.44*   ANIONGAP  --   --   --   --   --   --   --   --   --   --  3   ADY  --   --   --   --   --   --   --   --   --   --  9.0   GLC  --  101* 135* 132*  --    < >  --    < >  --    < > 117*    < > = values in this interval not displayed.     Medications     - MEDICATION INSTRUCTIONS -         banatrol plus  1 packet Per Feeding Tube TID w/meals     insulin aspart  1-6 Units Subcutaneous Q8H     insulin glargine  8 Units Subcutaneous At Bedtime     levETIRAcetam  1,000 mg Oral or Feeding Tube Q12H     miconazole with skin protectant   Topical BID     mirtazapine  7.5 mg Oral At Bedtime     multivitamins w/minerals  15 mL Per Feeding Tube Daily     OXcarbazepine  450 mg Oral or Feeding Tube At Bedtime     pantoprazole  40 mg Oral or Feeding Tube QAM AC     QUEtiapine  50 mg Oral At Bedtime      sodium chloride (PF)  10-40 mL Intracatheter Q8H     sodium chloride (PF)  10-40 mL Intracatheter Q7 Days     topiramate  50 mg Oral or Feeding Tube At Bedtime     vancomycin  125 mg Oral or Feeding Tube 4x Daily

## 2022-09-09 NOTE — PLAN OF CARE
Goal Outcome Evaluation:      Pt here with Cerebral abscess, encephalopathy . A&O x3. Neuros generalized weakness. VSS. Tele discontinued as tonight. NPO diet, on PEG. Takes pills via tube feeding. Refused Pure wick, only wants to have a brief on, Up with A2 lift, repo Q2hrs. Generalized pain. Pt scoring green on the Aggression Stop Light Tool. Discharge pending placement

## 2022-09-09 NOTE — PLAN OF CARE
Goal Outcome Evaluation:    Plan of Care Reviewed With: patient     Overall Patient Progress: no change    Reason for Admission: Cerebral Abscess    Cognitive/Mentation: A/Ox person and place. Disoriented x time and situation. Forgetful at times.  Neuros/CMS: Intact ex BUE 4/5, BLE 3/5, generalized weakness. Does not always follow commands.  VS: VSS. SBP goal <180.  GI: BS active, last BM 9/9/2022. Incontinent. Loose, watery stools. (+) C-diff.  : Purwick in place. Incontinent.  Pulmonary: LS clear.  Pain: Head pain, improves with PRN tylenol, rest, reposition.     Drains/Lines: R PICC S.L.  Skin: Sacrum/Coccyx and perineum redness and redness/rash- skin barrier cream applied.  Activity: Assist x 2 with lift. T/R Q2h.  Diet: NPO- PEG tube in place, running @50ml/hr, 200ml free water flush Q4h. Takes pills in PEG.     Therapies recs: TCU  Discharge: Pending    Aggression Stoplight Tool: GREEN    End of shift summary: Enteric/Contact isolation maintained r/t (+) C-diff and MRSA. Palliative consult pending.

## 2022-09-10 LAB
GLUCOSE BLDC GLUCOMTR-MCNC: 108 MG/DL (ref 70–99)
GLUCOSE BLDC GLUCOMTR-MCNC: 113 MG/DL (ref 70–99)
GLUCOSE BLDC GLUCOMTR-MCNC: 133 MG/DL (ref 70–99)
GLUCOSE BLDC GLUCOMTR-MCNC: 154 MG/DL (ref 70–99)
PHOSPHATE SERPL-MCNC: 2.6 MG/DL (ref 2.5–4.5)
POTASSIUM BLD-SCNC: 3.8 MMOL/L (ref 3.4–5.3)

## 2022-09-10 PROCEDURE — 250N000013 HC RX MED GY IP 250 OP 250 PS 637: Performed by: INTERNAL MEDICINE

## 2022-09-10 PROCEDURE — 999N000190 HC STATISTIC VAT ROUNDS

## 2022-09-10 PROCEDURE — 99232 SBSQ HOSP IP/OBS MODERATE 35: CPT | Performed by: INTERNAL MEDICINE

## 2022-09-10 PROCEDURE — 250N000013 HC RX MED GY IP 250 OP 250 PS 637: Performed by: HOSPITALIST

## 2022-09-10 PROCEDURE — 250N000013 HC RX MED GY IP 250 OP 250 PS 637

## 2022-09-10 PROCEDURE — 120N000001 HC R&B MED SURG/OB

## 2022-09-10 PROCEDURE — 84100 ASSAY OF PHOSPHORUS: CPT | Performed by: INTERNAL MEDICINE

## 2022-09-10 PROCEDURE — 84132 ASSAY OF SERUM POTASSIUM: CPT | Performed by: INTERNAL MEDICINE

## 2022-09-10 RX ORDER — CHOLESTYRAMINE 4 G/9G
1 POWDER, FOR SUSPENSION ORAL DAILY
Status: DISCONTINUED | OUTPATIENT
Start: 2022-09-10 | End: 2022-09-11

## 2022-09-10 RX ORDER — SACCHAROMYCES BOULARDII 250 MG
250 CAPSULE ORAL 2 TIMES DAILY
Status: DISCONTINUED | OUTPATIENT
Start: 2022-09-10 | End: 2022-10-04 | Stop reason: HOSPADM

## 2022-09-10 RX ADMIN — MICONAZOLE NITRATE: 20 CREAM TOPICAL at 09:29

## 2022-09-10 RX ADMIN — Medication 40 MG: at 09:29

## 2022-09-10 RX ADMIN — VANCOMYCIN HYDROCHLORIDE 125 MG: KIT at 17:43

## 2022-09-10 RX ADMIN — INSULIN ASPART 1 UNITS: 100 INJECTION, SOLUTION INTRAVENOUS; SUBCUTANEOUS at 22:05

## 2022-09-10 RX ADMIN — ACETAMINOPHEN 650 MG: 325 SUSPENSION ORAL at 09:29

## 2022-09-10 RX ADMIN — VANCOMYCIN HYDROCHLORIDE 125 MG: KIT at 13:09

## 2022-09-10 RX ADMIN — OXCARBAZEPINE 450 MG: 300 SUSPENSION ORAL at 22:05

## 2022-09-10 RX ADMIN — ACETAMINOPHEN 650 MG: 325 SUSPENSION ORAL at 20:26

## 2022-09-10 RX ADMIN — INSULIN GLARGINE 8 UNITS: 100 INJECTION, SOLUTION SUBCUTANEOUS at 22:05

## 2022-09-10 RX ADMIN — Medication 1 PACKET: at 17:44

## 2022-09-10 RX ADMIN — MICONAZOLE NITRATE: 20 CREAM TOPICAL at 20:29

## 2022-09-10 RX ADMIN — VANCOMYCIN HYDROCHLORIDE 125 MG: KIT at 22:06

## 2022-09-10 RX ADMIN — CHOLESTYRAMINE 4 G: 4 POWDER, FOR SUSPENSION ORAL at 17:44

## 2022-09-10 RX ADMIN — TOPIRAMATE 50 MG: 50 TABLET ORAL at 21:09

## 2022-09-10 RX ADMIN — Medication 1 PACKET: at 09:29

## 2022-09-10 RX ADMIN — LEVETIRACETAM 1000 MG: 100 SOLUTION ORAL at 09:29

## 2022-09-10 RX ADMIN — QUETIAPINE FUMARATE 50 MG: 50 TABLET ORAL at 21:09

## 2022-09-10 RX ADMIN — ACETAMINOPHEN 650 MG: 325 SUSPENSION ORAL at 14:55

## 2022-09-10 RX ADMIN — Medication 15 ML: at 09:29

## 2022-09-10 RX ADMIN — Medication 1 PACKET: at 13:09

## 2022-09-10 RX ADMIN — Medication 250 MG: at 20:29

## 2022-09-10 RX ADMIN — ACETAMINOPHEN 650 MG: 325 SUSPENSION ORAL at 05:26

## 2022-09-10 RX ADMIN — MIRTAZAPINE 7.5 MG: 7.5 TABLET, FILM COATED ORAL at 21:09

## 2022-09-10 RX ADMIN — VANCOMYCIN HYDROCHLORIDE 125 MG: KIT at 09:29

## 2022-09-10 RX ADMIN — LEVETIRACETAM 1000 MG: 100 SOLUTION ORAL at 20:27

## 2022-09-10 ASSESSMENT — ACTIVITIES OF DAILY LIVING (ADL)
ADLS_ACUITY_SCORE: 42
ADLS_ACUITY_SCORE: 44
ADLS_ACUITY_SCORE: 48
ADLS_ACUITY_SCORE: 39
ADLS_ACUITY_SCORE: 44
ADLS_ACUITY_SCORE: 39
ADLS_ACUITY_SCORE: 48
ADLS_ACUITY_SCORE: 42
ADLS_ACUITY_SCORE: 46
ADLS_ACUITY_SCORE: 46
ADLS_ACUITY_SCORE: 42
ADLS_ACUITY_SCORE: 44

## 2022-09-10 NOTE — PROGRESS NOTES
Enteric for C. Dif and MRSA. A/Ox2. VSS. Ax2, lift turn and repo. NPO, Peg tube with TF running at 50 mL/hr. CDI. Right PICC SL. Has a HA and pain in khushbu area. Khushbu-area is red and rash-like. Incontinent of BM 1x this shift. Barrier cream in room. Palliative consult pending. Plan to go to TCU upon discharge.

## 2022-09-10 NOTE — PROGRESS NOTES
[facility]  Hospitalist Progress Note  Sekou Harvey MD  09/10/2022    Assessment & Plan         Julisa Chaney is a 77 year old female admitted on 7/27/2022.  PMH of trigeminal neuralgia who was recently admitted to [facility] on 7/19 for severe sepsis due to COVID-19 infection and suspected bacterial pneumonia. She was treated with 5 days of remdesivir and did not require any steroids, also treated with IV vancomycin and IV meropenem.     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  She was switched to IV vancomycin, cefepime and metronidazole and transferred to [facility] for neurosurgery assessment. MRI brain w/wo contrast 7/30 showed ventriculitis with probable abscess. Neurosurgery/neurology/infectious disease/oncology consulted. Patient taken to the OR on 7/31 for left frontal ventriculostomy.   Since then has remained on broad spectrum antibiotics.  In addition she has developed C. Difficile colitis and is on treatment.  She has continued to have a significant encephalopathy which is very slowly improving.     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  * flow cytometry did not show any evidence of malignancy.  * s/p Left frontal ventriculostomy on 7/31/2022, EVD removed 8/8; CSF cultures have remained negative; CSF counts decreasing 469---233 ---56 (last CSF from 8/18)  * Head CT from 8/19 with stable presumed vasogenic edema in the white matter of the anterior inferior left frontal lobe related to ventriculitis of the left lateral ventricle; stable ventriculomegaly of the left  lateral ventricle   * Has been having persistent headaches; reevaluated by neurosurgery 8/27 and recommended head CT, done on 8/29-->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 The left temporal horn remains slightly dilated but no samuel hydrocephalus. No acute changes.  - Remains afebrile; leukocytosis resolved ; was initially on vancomycin, cefepime, and metronidazole, then Meropenem and Vancomycin; ID signed off, completed 6 weeks treatment until 9/8/22.  - Continue scheduled Tylenol 650 mg TID. PRN ibuprofen ordered.  - Trying to avoid any narcotics with her confusion/ encephalopathy.  - PT/OT consulted, however patient does not put much effort into therapies currently and PT/OT signing off for now.  - Progress has been slow and fairly minimal. Concern about overall prognosis. Briefly discussed palliative care with patient's daughter on 9/6/22. She requested re-evaluation from psychiatry regarding depression (completed 9/8/22, see consult note). Seems to want to see how she does with the medication prior to having any discussion about palliative care. Tried to call daughter Alissa today (9/8/22), but just got voicemail.     Acute infectious encephalopathy due to above:  * mentation much improved since admission ; fluctuating at times ; will not engage in conversation most of the times.  * 8/23 discontinued prn benadryl, narcotics; minimize narcotic.  - Continue to treat underlying issues as noted above.   - Maintain normal sleep/wake cycle as able.  - Use minimal single dosing of narcotic if absolutely needed for pain, no standing order.     Suicidal throughs/agitation  Depression  * With improvement in her mentation, she has started becoming at times restless and agitated. On 8/27, reported having suicidal thoughts. No plans/attempts.  * initiated suicide precautions and sitter, evaluated by psychiatry service, discontinued.   - Reassess periodically for ongoing need.  - Anti depressant not  recommended currently in the setting of delirium/encephalopathy.  - HS seroquel increased 8/31.  - Psych reconsulted on 9/8/22, restarted remeron (see consult note).     C. difficile colitis  Patient was noted to have copious diarrhea on 8/1 and was noted positive for C. Difficile.  * Rectal tube came off 8/28 still with some diarrhea but not severe  - Continue oral vancomycin - till 9/22.  - add questran  - add probiotic     Acute on chronic anemia  Prior labs show baseline hemoglobin of about 9-10.   * Hb on admission was 11.7, slowly trended down, got 1 unit PRBC on 8/8/22 for Hb 6.9  * Received IV iron infusion x3 with first on 8/11/22.  - Iron panel on 7/30/22 showed that her total iron was low at 14, binding capacity was low at 28, iron saturation was 6 and B12 was high and folate was normal  * No obvious ongoing bleeding noted; Hb has now remained stable around 9 to 10 on 8/26  - Continue to monitor hgb periodically.     History of trigeminal neuralgia  - Continue PTA Trileptal , Keppra and Topamax.     History of chronic pyloric ulcer  She had EGD done in 2020 which showed gastroduodenitis with a duodenal stricture.  - Continue PPI.     Hyperglycemia, resolved  Recent HbA1c was 5.6 on 7/19/22. Was not on any medication prior to admission.  - Continue medium dose sliding scale insulin.  - Requiring no supplemental insulin and no hypoglycemia, glucose checks q8h during the day and at 0200.  - Decreased lantus to 8 units nightly 9/2.  - Blood sugars well controlled.      Intermittent Hypokalemia, Hypo/hypernatremia, Hypophosphatemia  - Replacement per protocol.     Hypernatremia: resolved.  - Continue free water flushes 200 ml every 4 hrs.     Severe dysphagia  Severe protein calorie malnutrition  - Nutrition following for tube feeds via G tube (placed 8/16) ; getting free water flushes.  - Limited re-evaluation 9/5 by SLP; recommend continued NPO status.     Physical deconditioning  - Will need TCU placement;  declined by LTACH.  - PT/OT signed off due to lack of patient participation.  - SW following for disposition.     Diet: NPO for Medical/Clinical Reasons Except for: Other; Specify: NPO For oral intake and gastric feedings for 6 hours post insertion Gastrostomy G/GJ tube. May feed through Jejunal or Distal PORT immediately for GJ tubes ONLY.  Adult Formula Drip Feeding: Continuous Vital 1.5; Gastrostomy; Goal Rate: 50; mL/hr; Medication - Feeding Tube Flush Frequency: At least 15-30 mL water before and after medication administration and with tube clogging; Amount to Send (Nutrition us...    DVT Prophylaxis: Pneumatic Compression Devices  Abrams Catheter: Not present  Central Lines: PRESENT  PICC Triple Lumen 07/22/22 Right Basilic Vascular access-Site Assessment: WDL  Cardiac Monitoring: None  Code Status: Full Code       Disposition Plan  - Will need TCU placement; declined by LTACH.  - PT/OT signed off due to lack of patient participation.  - SW following for disposition.            Interval History   -- chart reviewed in brief  -- prolonged hospital stay  -- still has ongoing diarrhea frequently    -Data reviewed today: I reviewed all new labs and imaging over the last 24 hours. I personally reviewed no images or EKG's today.    Physical Exam    , Blood pressure 126/58, pulse 85, temperature 97.5  F (36.4  C), temperature source Axillary, resp. rate 18, weight 50.1 kg (110 lb 7.2 oz), SpO2 98 %, not currently breastfeeding.  Vitals:    09/04/22 0618 09/05/22 0552 09/07/22 0500   Weight: 50.6 kg (111 lb 8.8 oz) 50.3 kg (110 lb 14.3 oz) 50.1 kg (110 lb 7.2 oz)     Vital Signs with Ranges  Temp:  [97  F (36.1  C)-97.5  F (36.4  C)] 97.5  F (36.4  C)  Pulse:  [80-88] 85  Resp:  [16-18] 18  BP: (114-130)/(55-62) 126/58  SpO2:  [97 %-98 %] 98 %  I/O's Last 24 hours  I/O last 3 completed shifts:  In: 3280 [NG/GT:2780]  Out: -     Constitutional: Awake, alert, cooperative, no apparent distress  Respiratory: Clear to  auscultation bilaterally, no crackles or wheezing  Cardiovascular: Regular rate and rhythm, normal S1 and S2, and no murmur noted  GI: Normal bowel sounds, soft, non-distended, non-tender  Skin/Integumen: No rashes, no cyanosis, no edema  Other:      Medications   All medications were reviewed.    - MEDICATION INSTRUCTIONS -         banatrol plus  1 packet Per Feeding Tube TID w/meals     insulin aspart  1-6 Units Subcutaneous Q8H     insulin glargine  8 Units Subcutaneous At Bedtime     levETIRAcetam  1,000 mg Oral or Feeding Tube Q12H     miconazole with skin protectant   Topical BID     mirtazapine  7.5 mg Oral At Bedtime     multivitamins w/minerals  15 mL Per Feeding Tube Daily     OXcarbazepine  450 mg Oral or Feeding Tube At Bedtime     pantoprazole  40 mg Oral or Feeding Tube QAM AC     QUEtiapine  50 mg Oral At Bedtime     sodium chloride (PF)  10-40 mL Intracatheter Q8H     sodium chloride (PF)  10-40 mL Intracatheter Q7 Days     topiramate  50 mg Oral or Feeding Tube At Bedtime     vancomycin  125 mg Oral or Feeding Tube 4x Daily        Data   Recent Labs   Lab 09/10/22  1230 09/10/22  1040 09/10/22  0826 09/10/22  0620 09/09/22  1301 09/09/22  0511 09/08/22  1324 09/08/22  0637 09/05/22  2021 09/05/22  1514 09/05/22  1240 09/05/22  0600   WBC  --   --   --   --   --   --   --   --   --  10.8  --   --    HGB  --   --   --   --   --   --   --   --   --  10.5*  --   --    MCV  --   --   --   --   --   --   --   --   --  85  --   --    PLT  --   --   --   --   --   --   --   --   --  353  --   --    NA  --   --   --   --   --   --   --   --   --   --   --  138   POTASSIUM  --  3.8  --   --   --  4.0  --  3.9   < >  --   --  3.6   CHLORIDE  --   --   --   --   --   --   --   --   --   --   --  107   CO2  --   --   --   --   --   --   --   --   --   --   --  28   BUN  --   --   --   --   --   --   --   --   --   --   --  15   CR  --   --   --   --   --   --   --   --   --   --   --  0.44*   ANIONGAP  --   --    --   --   --   --   --   --   --   --   --  3   ADY  --   --   --   --   --   --   --   --   --   --   --  9.0   *  --  113* 133*   < >  --    < >  --    < >  --    < > 117*    < > = values in this interval not displayed.       No results found for this or any previous visit (from the past 24 hour(s)).    Sekou Harvey MD  Text Page  (7am to 6pm)

## 2022-09-10 NOTE — PLAN OF CARE
Reason for Admission: Cerebral abscess     Cognitive/Mentation: A&O x 3-4  Neuros/CMS: Intact ex generalized weakness. UE 4/5 LE 3/5  VS: Stable on RA   GI: BS active, + flatus. Incontinent. Loose stool 9/10/22.  : Voiding. Incontinent. No PW at this time due to perineal redness  Pulmonary: LS clear.  Pain: HA; Tylenol given.      Drains/Lines: R PICC, PEG tube  Skin: Perineal/buttocks redness   Activity: Assist x1-2 Lift  Diet: NPO with TF @ 200 ml/hr with FWF 200ml q4.    Discharge: Pending     Aggression Stoplight Tool: Green     End of shift summary: No acute changes overnight.

## 2022-09-11 ENCOUNTER — APPOINTMENT (OUTPATIENT)
Dept: SPEECH THERAPY | Facility: CLINIC | Age: 77
DRG: 023 | End: 2022-09-11
Attending: INTERNAL MEDICINE
Payer: COMMERCIAL

## 2022-09-11 LAB
GLUCOSE BLDC GLUCOMTR-MCNC: 104 MG/DL (ref 70–99)
GLUCOSE BLDC GLUCOMTR-MCNC: 107 MG/DL (ref 70–99)
GLUCOSE BLDC GLUCOMTR-MCNC: 130 MG/DL (ref 70–99)
PHOSPHATE SERPL-MCNC: 3.5 MG/DL (ref 2.5–4.5)
POTASSIUM BLD-SCNC: 4.3 MMOL/L (ref 3.4–5.3)

## 2022-09-11 PROCEDURE — 250N000013 HC RX MED GY IP 250 OP 250 PS 637: Performed by: HOSPITALIST

## 2022-09-11 PROCEDURE — 120N000001 HC R&B MED SURG/OB

## 2022-09-11 PROCEDURE — 250N000013 HC RX MED GY IP 250 OP 250 PS 637: Performed by: INTERNAL MEDICINE

## 2022-09-11 PROCEDURE — 250N000013 HC RX MED GY IP 250 OP 250 PS 637: Performed by: PHYSICIAN ASSISTANT

## 2022-09-11 PROCEDURE — 250N000013 HC RX MED GY IP 250 OP 250 PS 637

## 2022-09-11 PROCEDURE — 92526 ORAL FUNCTION THERAPY: CPT | Mod: GN | Performed by: REHABILITATION PRACTITIONER

## 2022-09-11 PROCEDURE — 84132 ASSAY OF SERUM POTASSIUM: CPT | Performed by: INTERNAL MEDICINE

## 2022-09-11 PROCEDURE — 99232 SBSQ HOSP IP/OBS MODERATE 35: CPT | Performed by: INTERNAL MEDICINE

## 2022-09-11 PROCEDURE — 84100 ASSAY OF PHOSPHORUS: CPT | Performed by: INTERNAL MEDICINE

## 2022-09-11 RX ORDER — CHOLESTYRAMINE 4 G/9G
1 POWDER, FOR SUSPENSION ORAL 2 TIMES DAILY
Status: DISCONTINUED | OUTPATIENT
Start: 2022-09-11 | End: 2022-10-02

## 2022-09-11 RX ORDER — TRAMADOL HYDROCHLORIDE 50 MG/1
50 TABLET ORAL EVERY 6 HOURS PRN
Status: DISCONTINUED | OUTPATIENT
Start: 2022-09-11 | End: 2022-09-19

## 2022-09-11 RX ADMIN — ACETAMINOPHEN 650 MG: 325 SUSPENSION ORAL at 05:15

## 2022-09-11 RX ADMIN — OXCARBAZEPINE 450 MG: 300 SUSPENSION ORAL at 21:00

## 2022-09-11 RX ADMIN — LEVETIRACETAM 1000 MG: 100 SOLUTION ORAL at 20:57

## 2022-09-11 RX ADMIN — CHOLESTYRAMINE 4 G: 4 POWDER, FOR SUSPENSION ORAL at 20:57

## 2022-09-11 RX ADMIN — INSULIN GLARGINE 8 UNITS: 100 INJECTION, SOLUTION SUBCUTANEOUS at 21:26

## 2022-09-11 RX ADMIN — Medication 1 PACKET: at 17:27

## 2022-09-11 RX ADMIN — Medication 1 MG: at 20:57

## 2022-09-11 RX ADMIN — CHOLESTYRAMINE 4 G: 4 POWDER, FOR SUSPENSION ORAL at 08:36

## 2022-09-11 RX ADMIN — Medication 1 PACKET: at 08:37

## 2022-09-11 RX ADMIN — VANCOMYCIN HYDROCHLORIDE 125 MG: KIT at 17:27

## 2022-09-11 RX ADMIN — MICONAZOLE NITRATE: 20 CREAM TOPICAL at 04:08

## 2022-09-11 RX ADMIN — Medication 250 MG: at 20:57

## 2022-09-11 RX ADMIN — Medication 250 MG: at 08:36

## 2022-09-11 RX ADMIN — ACETAMINOPHEN 650 MG: 325 SUSPENSION ORAL at 11:02

## 2022-09-11 RX ADMIN — VANCOMYCIN HYDROCHLORIDE 125 MG: KIT at 08:37

## 2022-09-11 RX ADMIN — MICONAZOLE NITRATE: 20 CREAM TOPICAL at 21:11

## 2022-09-11 RX ADMIN — TRAMADOL HYDROCHLORIDE 50 MG: 50 TABLET, COATED ORAL at 17:27

## 2022-09-11 RX ADMIN — Medication 3 MG: at 01:43

## 2022-09-11 RX ADMIN — Medication 40 MG: at 08:37

## 2022-09-11 RX ADMIN — MICONAZOLE NITRATE: 20 CREAM TOPICAL at 08:37

## 2022-09-11 RX ADMIN — Medication 15 ML: at 08:37

## 2022-09-11 RX ADMIN — Medication 1 PACKET: at 11:02

## 2022-09-11 RX ADMIN — LEVETIRACETAM 1000 MG: 100 SOLUTION ORAL at 08:36

## 2022-09-11 RX ADMIN — VANCOMYCIN HYDROCHLORIDE 125 MG: KIT at 13:25

## 2022-09-11 RX ADMIN — ACETAMINOPHEN 650 MG: 325 SUSPENSION ORAL at 15:39

## 2022-09-11 RX ADMIN — ANORECTAL OINTMENT: 15.7; .44; 24; 20.6 OINTMENT TOPICAL at 17:26

## 2022-09-11 RX ADMIN — VANCOMYCIN HYDROCHLORIDE 125 MG: KIT at 21:00

## 2022-09-11 RX ADMIN — QUETIAPINE FUMARATE 50 MG: 50 TABLET ORAL at 21:10

## 2022-09-11 RX ADMIN — ACETAMINOPHEN 650 MG: 325 SUSPENSION ORAL at 01:09

## 2022-09-11 RX ADMIN — TOPIRAMATE 50 MG: 50 TABLET ORAL at 21:10

## 2022-09-11 RX ADMIN — MIRTAZAPINE 7.5 MG: 7.5 TABLET, FILM COATED ORAL at 21:10

## 2022-09-11 RX ADMIN — ACETAMINOPHEN 650 MG: 325 SUSPENSION ORAL at 19:36

## 2022-09-11 ASSESSMENT — ACTIVITIES OF DAILY LIVING (ADL)
ADLS_ACUITY_SCORE: 42
ADLS_ACUITY_SCORE: 43
ADLS_ACUITY_SCORE: 42
ADLS_ACUITY_SCORE: 43
ADLS_ACUITY_SCORE: 46
ADLS_ACUITY_SCORE: 42
ADLS_ACUITY_SCORE: 43

## 2022-09-11 NOTE — PLAN OF CARE
Reason for Admission: Cerebral abscess     Cognitive/Mentation: A&O x2  Neuros/CMS: Intact ex generalized weakness. UE 4/5 LE 3/5  VS: Stable on RA   GI: BS active, + flatus. Incontinent. 2 Loose BMs overnight.   : Voiding. Incontinent. No PW at this time due to perineal redness  Pulmonary: LS clear.  Pain: HA; Tylenol given.      Drains/Lines: R PICC, PEG tube  Skin: Perineal/buttocks redness. Seeing improvements. Skin barrier applied frequently.  Activity: Assist x1-2 Lift  Diet: NPO with TF @ 200 ml/hr with FWF 200ml q4.     Discharge: Pending     Aggression Stoplight Tool: Yellow     End of shift summary: No acute changes overnight. Melatonin given to promote rest. Pt restless and anxious through night.

## 2022-09-11 NOTE — PLAN OF CARE
Goal Outcome Evaluation:    Plan of Care Reviewed With: patient     Overall Patient Progress: no change    Reason for Admission: Cerebral Abscess     Cognitive/Mentation: A/Ox person, place, time. Disoriented x situation. Forgetful at times.  Neuros/CMS: Intact ex BUE 4/5, BLE 3/5, generalized weakness. Does not always follow commands.  VS: VSS. SBP goal <180.  GI: BS active, last BM 9/11/2022. Incontinent. Loose, watery stools. (+) C-diff.  : Incontinent.  Pulmonary: LS clear.  Pain: Head pain, improves with PRN tylenol, rest, reposition.      Drains/Lines: R PICC S.L.  Skin: Sacrum/Coccyx and perineum redness and redness/rash- skin barrier cream applied.  Activity: Assist x 2 with lift. T/R Q2h.  Diet: NPO- PEG tube in place, running @50ml/hr, 200ml free water flush Q4h. Takes pills in PEG.      Therapies recs: TCU  Discharge: Pending     Aggression Stoplight Tool: GREEN     End of shift summary: Enteric/Contact isolation maintained r/t (+) C-diff and MRSA. Palliative consult pending.

## 2022-09-11 NOTE — PROGRESS NOTES
[facility]  Hospitalist Progress Note  Sekou Harvey MD  09/11/2022    Assessment & Plan         Julisa Chaney is a 77 year old female admitted on 7/27/2022.  PMH of trigeminal neuralgia who was recently admitted to [facility] on 7/19 for severe sepsis due to COVID-19 infection and suspected bacterial pneumonia. She was treated with 5 days of remdesivir and did not require any steroids, also treated with IV vancomycin and IV meropenem.     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  She was switched to IV vancomycin, cefepime and metronidazole and transferred to [facility] for neurosurgery assessment. MRI brain w/wo contrast 7/30 showed ventriculitis with probable abscess. Neurosurgery/neurology/infectious disease/oncology consulted. Patient taken to the OR on 7/31 for left frontal ventriculostomy.   Since then has remained on broad spectrum antibiotics.  In addition she has developed C. Difficile colitis and is on treatment.  She has continued to have a significant encephalopathy which is very slowly improving.     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  * flow cytometry did not show any evidence of malignancy.  * s/p Left frontal ventriculostomy on 7/31/2022, EVD removed 8/8; CSF cultures have remained negative; CSF counts decreasing 469---233 ---56 (last CSF from 8/18)  * Head CT from 8/19 with stable presumed vasogenic edema in the white matter of the anterior inferior left frontal lobe related to ventriculitis of the left lateral ventricle; stable ventriculomegaly of the left  lateral ventricle   * Has been having persistent headaches; reevaluated by neurosurgery 8/27 and recommended head CT, done on 8/29-->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 The left temporal horn remains slightly dilated but no samuel hydrocephalus. No acute changes.  - Remains afebrile; leukocytosis resolved ; was initially on vancomycin, cefepime, and metronidazole, then Meropenem and Vancomycin; ID signed off, completed 6 weeks treatment until 9/8/22.  - Continue scheduled Tylenol 650 mg TID. PRN ibuprofen ordered.  - Trying to avoid any narcotics with her confusion/ encephalopathy.  - PT/OT consulted, however patient does not put much effort into therapies currently and PT/OT signing off for now.  - Progress has been slow and fairly minimal. Concern about overall prognosis. Briefly discussed palliative care with patient's daughter on 9/6/22. She requested re-evaluation from psychiatry regarding depression (completed 9/8/22, see consult note). Seems to want to see how she does with the medication prior to having any discussion about palliative care.       Acute infectious encephalopathy due to above:  * mentation much improved since admission ; fluctuating at times ; will not engage in conversation most of the times.  * 8/23 discontinued prn benadryl, narcotics; minimize narcotic.  - Continue to treat underlying issues as noted above.   - Maintain normal sleep/wake cycle as able.  - Use minimal single dosing of narcotic if absolutely needed for pain, no standing order.   - had some head, will add ultram 50 mg q 6 hrs prn     Suicidal throughs/agitation  Depression  * With improvement in her mentation, she has started becoming at times restless and agitated. On 8/27, reported having suicidal thoughts. No plans/attempts.  * initiated suicide precautions and sitter, evaluated by psychiatry service, discontinued.   - Reassess periodically for ongoing need.  - Anti depressant not recommended  currently in the setting of delirium/encephalopathy.  - HS seroquel increased 8/31.  - Psych reconsulted on 9/8/22, restarted remeron (see consult note).     C. difficile colitis  Patient was noted to have copious diarrhea on 8/1 and was noted positive for C. Difficile.  * Rectal tube came off 8/28 still with some diarrhea but not severe  - Continue oral vancomycin - till 9/22.  - increase questran from 4g daily to BID  - continue probiotic  - barrier cream for skin breakdown     Acute on chronic anemia  Prior labs show baseline hemoglobin of about 9-10.   * Hb on admission was 11.7, slowly trended down, got 1 unit PRBC on 8/8/22 for Hb 6.9  * Received IV iron infusion x3 with first on 8/11/22.  - Iron panel on 7/30/22 showed that her total iron was low at 14, binding capacity was low at 28, iron saturation was 6 and B12 was high and folate was normal  * No obvious ongoing bleeding noted; Hb has now remained stable around 9 to 10 on 8/26  - Continue to monitor hgb periodically.     History of trigeminal neuralgia  - Continue PTA Trileptal , Keppra and Topamax.     History of chronic pyloric ulcer  She had EGD done in 2020 which showed gastroduodenitis with a duodenal stricture.  - Continue PPI.     Hyperglycemia, resolved  Recent HbA1c was 5.6 on 7/19/22. Was not on any medication prior to admission.  - Continue medium dose sliding scale insulin.  - Requiring no supplemental insulin and no hypoglycemia, glucose checks q8h during the day and at 0200.  - Decreased lantus to 8 units nightly 9/2.  - Blood sugars well controlled.      Intermittent Hypokalemia, Hypo/hypernatremia, Hypophosphatemia  - Replacement per protocol.     Hypernatremia: resolved.  - Continue free water flushes 200 ml every 4 hrs.     Severe dysphagia  Severe protein calorie malnutrition  - Nutrition following for tube feeds via G tube (placed 8/16) ; getting free water flushes.  - Limited re-evaluation 9/5 by SLP; recommend continued NPO  status.     Physical deconditioning  - Will need TCU placement; declined by LTACH.  - PT/OT signed off due to lack of patient participation.  - SW following for disposition.     Diet: NPO for Medical/Clinical Reasons Except for: Other; Specify: NPO For oral intake and gastric feedings for 6 hours post insertion Gastrostomy G/GJ tube. May feed through Jejunal or Distal PORT immediately for GJ tubes ONLY.  Adult Formula Drip Feeding: Continuous Vital 1.5; Gastrostomy; Goal Rate: 50; mL/hr; Medication - Feeding Tube Flush Frequency: At least 15-30 mL water before and after medication administration and with tube clogging; Amount to Send (Nutrition us...    DVT Prophylaxis: Pneumatic Compression Devices  Abrams Catheter: Not present  Central Lines: PRESENT  PICC Triple Lumen 07/22/22 Right Basilic Vascular access-Site Assessment: WDL  Cardiac Monitoring: None  Code Status: Full Code       Disposition Plan  - Will need TCU placement; declined by LTACH.  - PT/OT signed off due to lack of patient participation.  - SW following for disposition.            Interval History   -- chart reviewed in brief  -- prolonged hospital stay  -- still has ongoing diarrhea frequently    -Data reviewed today: I reviewed all new labs and imaging over the last 24 hours. I personally reviewed no images or EKG's today.    Physical Exam    , Blood pressure 128/59, pulse 87, temperature 98.2  F (36.8  C), temperature source Axillary, resp. rate 16, weight 50.1 kg (110 lb 7.2 oz), SpO2 98 %, not currently breastfeeding.  Vitals:    09/04/22 0618 09/05/22 0552 09/07/22 0500   Weight: 50.6 kg (111 lb 8.8 oz) 50.3 kg (110 lb 14.3 oz) 50.1 kg (110 lb 7.2 oz)     Vital Signs with Ranges  Temp:  [96.8  F (36  C)-98.2  F (36.8  C)] 98.2  F (36.8  C)  Pulse:  [82-87] 87  Resp:  [16] 16  BP: (107-132)/(59-68) 128/59  SpO2:  [92 %-98 %] 98 %  I/O's Last 24 hours  I/O last 3 completed shifts:  In: 1510 [NG/GT:1013]  Out: -     Constitutional:  no apparent  distress  Respiratory: Clear to auscultation bilaterally, no crackles or wheezing  Cardiovascular: Regular rate and rhythm, normal S1 and S2,   GI: Normal bowel sounds, soft, non-distended, non-tender  Skin/Integumen: No rashes, no cyanosis, no edema  Other:      Medications   All medications were reviewed.    - MEDICATION INSTRUCTIONS -         banatrol plus  1 packet Per Feeding Tube TID w/meals     cholestyramine  1 packet Oral BID     insulin aspart  1-6 Units Subcutaneous Q8H     insulin glargine  8 Units Subcutaneous At Bedtime     levETIRAcetam  1,000 mg Oral or Feeding Tube Q12H     miconazole with skin protectant   Topical BID     mirtazapine  7.5 mg Oral At Bedtime     multivitamins w/minerals  15 mL Per Feeding Tube Daily     OXcarbazepine  450 mg Oral or Feeding Tube At Bedtime     pantoprazole  40 mg Oral or Feeding Tube QAM AC     QUEtiapine  50 mg Oral At Bedtime     saccharomyces boulardii  250 mg Per Feeding Tube BID     sodium chloride (PF)  10-40 mL Intracatheter Q8H     sodium chloride (PF)  10-40 mL Intracatheter Q7 Days     topiramate  50 mg Oral or Feeding Tube At Bedtime     vancomycin  125 mg Oral or Feeding Tube 4x Daily        Data   Recent Labs   Lab 09/11/22  1326 09/11/22  0538 09/11/22  0514 09/10/22  2148 09/10/22  1230 09/10/22  1040 09/09/22  1301 09/09/22  0511 09/05/22  2021 09/05/22  1514 09/05/22  1240 09/05/22  0600   WBC  --   --   --   --   --   --   --   --   --  10.8  --   --    HGB  --   --   --   --   --   --   --   --   --  10.5*  --   --    MCV  --   --   --   --   --   --   --   --   --  85  --   --    PLT  --   --   --   --   --   --   --   --   --  353  --   --    NA  --   --   --   --   --   --   --   --   --   --   --  138   POTASSIUM  --  4.3  --   --   --  3.8  --  4.0   < >  --   --  3.6   CHLORIDE  --   --   --   --   --   --   --   --   --   --   --  107   CO2  --   --   --   --   --   --   --   --   --   --   --  28   BUN  --   --   --   --   --   --   --    --   --   --   --  15   CR  --   --   --   --   --   --   --   --   --   --   --  0.44*   ANIONGAP  --   --   --   --   --   --   --   --   --   --   --  3   ADY  --   --   --   --   --   --   --   --   --   --   --  9.0   *  --  130* 154*   < >  --    < >  --    < >  --    < > 117*    < > = values in this interval not displayed.       No results found for this or any previous visit (from the past 24 hour(s)).    Sekou Harvey MD  Text Page  (7am to 6pm)

## 2022-09-12 ENCOUNTER — APPOINTMENT (OUTPATIENT)
Dept: SPEECH THERAPY | Facility: CLINIC | Age: 77
DRG: 023 | End: 2022-09-12
Attending: INTERNAL MEDICINE
Payer: COMMERCIAL

## 2022-09-12 LAB
ANION GAP SERPL CALCULATED.3IONS-SCNC: 7 MMOL/L (ref 3–14)
BUN SERPL-MCNC: 19 MG/DL (ref 7–30)
CALCIUM SERPL-MCNC: 9.1 MG/DL (ref 8.5–10.1)
CHLORIDE BLD-SCNC: 102 MMOL/L (ref 94–109)
CO2 SERPL-SCNC: 27 MMOL/L (ref 20–32)
CREAT SERPL-MCNC: 0.51 MG/DL (ref 0.52–1.04)
ERYTHROCYTE [DISTWIDTH] IN BLOOD BY AUTOMATED COUNT: 20.8 % (ref 10–15)
GFR SERPL CREATININE-BSD FRML MDRD: >90 ML/MIN/1.73M2
GLUCOSE BLD-MCNC: 126 MG/DL (ref 70–99)
GLUCOSE BLDC GLUCOMTR-MCNC: 102 MG/DL (ref 70–99)
GLUCOSE BLDC GLUCOMTR-MCNC: 129 MG/DL (ref 70–99)
GLUCOSE BLDC GLUCOMTR-MCNC: 139 MG/DL (ref 70–99)
GLUCOSE BLDC GLUCOMTR-MCNC: 163 MG/DL (ref 70–99)
HCT VFR BLD AUTO: 32.6 % (ref 35–47)
HGB BLD-MCNC: 10 G/DL (ref 11.7–15.7)
MAGNESIUM SERPL-MCNC: 2.3 MG/DL (ref 1.6–2.3)
MCH RBC QN AUTO: 25.1 PG (ref 26.5–33)
MCHC RBC AUTO-ENTMCNC: 30.7 G/DL (ref 31.5–36.5)
MCV RBC AUTO: 82 FL (ref 78–100)
PHOSPHATE SERPL-MCNC: 4 MG/DL (ref 2.5–4.5)
PLATELET # BLD AUTO: 437 10E3/UL (ref 150–450)
POTASSIUM BLD-SCNC: 4.3 MMOL/L (ref 3.4–5.3)
RBC # BLD AUTO: 3.98 10E6/UL (ref 3.8–5.2)
SODIUM SERPL-SCNC: 136 MMOL/L (ref 133–144)
WBC # BLD AUTO: 9.8 10E3/UL (ref 4–11)

## 2022-09-12 PROCEDURE — 99233 SBSQ HOSP IP/OBS HIGH 50: CPT | Performed by: INTERNAL MEDICINE

## 2022-09-12 PROCEDURE — 120N000001 HC R&B MED SURG/OB

## 2022-09-12 PROCEDURE — 250N000013 HC RX MED GY IP 250 OP 250 PS 637: Performed by: HOSPITALIST

## 2022-09-12 PROCEDURE — 250N000013 HC RX MED GY IP 250 OP 250 PS 637

## 2022-09-12 PROCEDURE — 250N000013 HC RX MED GY IP 250 OP 250 PS 637: Performed by: INTERNAL MEDICINE

## 2022-09-12 PROCEDURE — 85027 COMPLETE CBC AUTOMATED: CPT | Performed by: INTERNAL MEDICINE

## 2022-09-12 PROCEDURE — 92526 ORAL FUNCTION THERAPY: CPT | Mod: GN | Performed by: SPEECH-LANGUAGE PATHOLOGIST

## 2022-09-12 PROCEDURE — 84100 ASSAY OF PHOSPHORUS: CPT | Performed by: HOSPITALIST

## 2022-09-12 PROCEDURE — 83735 ASSAY OF MAGNESIUM: CPT | Performed by: HOSPITALIST

## 2022-09-12 PROCEDURE — 80048 BASIC METABOLIC PNL TOTAL CA: CPT | Performed by: HOSPITALIST

## 2022-09-12 RX ORDER — MIRTAZAPINE 7.5 MG/1
7.5 TABLET, FILM COATED ORAL AT BEDTIME
Status: DISCONTINUED | OUTPATIENT
Start: 2022-09-12 | End: 2022-09-19

## 2022-09-12 RX ADMIN — VANCOMYCIN HYDROCHLORIDE 125 MG: KIT at 13:35

## 2022-09-12 RX ADMIN — Medication 3 MG: at 20:53

## 2022-09-12 RX ADMIN — VANCOMYCIN HYDROCHLORIDE 125 MG: KIT at 20:51

## 2022-09-12 RX ADMIN — ACETAMINOPHEN 650 MG: 325 SUSPENSION ORAL at 09:49

## 2022-09-12 RX ADMIN — CHOLESTYRAMINE 4 G: 4 POWDER, FOR SUSPENSION ORAL at 21:50

## 2022-09-12 RX ADMIN — Medication 15 ML: at 09:49

## 2022-09-12 RX ADMIN — Medication 1 PACKET: at 09:52

## 2022-09-12 RX ADMIN — MICONAZOLE NITRATE: 20 CREAM TOPICAL at 09:50

## 2022-09-12 RX ADMIN — ACETAMINOPHEN 650 MG: 325 SUSPENSION ORAL at 02:55

## 2022-09-12 RX ADMIN — TRAMADOL HYDROCHLORIDE 50 MG: 50 TABLET, COATED ORAL at 13:35

## 2022-09-12 RX ADMIN — VANCOMYCIN HYDROCHLORIDE 125 MG: KIT at 09:49

## 2022-09-12 RX ADMIN — Medication 40 MG: at 09:48

## 2022-09-12 RX ADMIN — Medication 1 PACKET: at 13:35

## 2022-09-12 RX ADMIN — QUETIAPINE FUMARATE 50 MG: 50 TABLET ORAL at 20:52

## 2022-09-12 RX ADMIN — INSULIN GLARGINE 8 UNITS: 100 INJECTION, SOLUTION SUBCUTANEOUS at 21:51

## 2022-09-12 RX ADMIN — TOPIRAMATE 50 MG: 50 TABLET ORAL at 20:53

## 2022-09-12 RX ADMIN — Medication 250 MG: at 09:49

## 2022-09-12 RX ADMIN — OXCARBAZEPINE 450 MG: 300 SUSPENSION ORAL at 20:51

## 2022-09-12 RX ADMIN — VANCOMYCIN HYDROCHLORIDE 125 MG: KIT at 19:10

## 2022-09-12 RX ADMIN — INSULIN ASPART 1 UNITS: 100 INJECTION, SOLUTION INTRAVENOUS; SUBCUTANEOUS at 21:51

## 2022-09-12 RX ADMIN — Medication 250 MG: at 20:53

## 2022-09-12 RX ADMIN — Medication 1 PACKET: at 19:10

## 2022-09-12 RX ADMIN — ANORECTAL OINTMENT: 15.7; .44; 24; 20.6 OINTMENT TOPICAL at 21:51

## 2022-09-12 RX ADMIN — CHOLESTYRAMINE 4 G: 4 POWDER, FOR SUSPENSION ORAL at 11:09

## 2022-09-12 RX ADMIN — MIRTAZAPINE 7.5 MG: 7.5 TABLET, FILM COATED ORAL at 20:52

## 2022-09-12 RX ADMIN — LEVETIRACETAM 1000 MG: 100 SOLUTION ORAL at 09:48

## 2022-09-12 RX ADMIN — MICONAZOLE NITRATE: 20 CREAM TOPICAL at 21:52

## 2022-09-12 RX ADMIN — TRAMADOL HYDROCHLORIDE 50 MG: 50 TABLET, COATED ORAL at 20:52

## 2022-09-12 RX ADMIN — TRAMADOL HYDROCHLORIDE 50 MG: 50 TABLET, COATED ORAL at 06:37

## 2022-09-12 RX ADMIN — LEVETIRACETAM 1000 MG: 100 SOLUTION ORAL at 20:52

## 2022-09-12 ASSESSMENT — ACTIVITIES OF DAILY LIVING (ADL)
ADLS_ACUITY_SCORE: 43
ADLS_ACUITY_SCORE: 42
ADLS_ACUITY_SCORE: 43
ADLS_ACUITY_SCORE: 42

## 2022-09-12 NOTE — PROGRESS NOTES
[facility]  Hospitalist Progress Note  Sekou Harvey MD  09/12/2022    Assessment & Plan         Julisa Chaney is a 77 year old female admitted on 7/27/2022.  PMH of trigeminal neuralgia who was recently admitted to [facility] on 7/19 for severe sepsis due to COVID-19 infection and suspected bacterial pneumonia. She was treated with 5 days of remdesivir and did not require any steroids, also treated with IV vancomycin and IV meropenem.     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  She was switched to IV vancomycin, cefepime and metronidazole and transferred to [facility] for neurosurgery assessment. MRI brain w/wo contrast 7/30 showed ventriculitis with probable abscess. Neurosurgery/neurology/infectious disease/oncology consulted. Patient taken to the OR on 7/31 for left frontal ventriculostomy.   Since then has remained on broad spectrum antibiotics.  In addition she has developed C. Difficile colitis and is on treatment.  She has continued to have a significant encephalopathy which is very slowly improving.     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  * flow cytometry did not show any evidence of malignancy.  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 The left temporal horn remains slightly dilated but no samuel hydrocephalus. No acute changes.  - Remains afebrile; leukocytosis resolved ; was initially on vancomycin, cefepime, and metronidazole, then Meropenem and Vancomycin; ID signed off, completed 6 weeks treatment until 9/8/22.  - Continue scheduled Tylenol 650 mg TID. PRN ibuprofen ordered.  - Trying to avoid any narcotics with her confusion/ encephalopathy.  - PT/OT consulted, however patient does not put much effort into therapies currently and PT/OT signing off for now.  - Progress has been slow and fairly minimal. Concern about overall prognosis. Briefly discussed palliative care with patient's daughter on 9/6/22. She requested re-evaluation from psychiatry regarding depression (completed 9/8/22, see consult note). Seems to want to see how she does with the medication prior to having any discussion about palliative care.       Acute infectious encephalopathy due to above:  * mentation much improved since admission ; fluctuating at times ; will not engage in conversation most of the times.  * 8/23 discontinued prn benadryl, narcotics; minimize narcotic.  - Continue to treat underlying issues as noted above.   - Maintain normal sleep/wake cycle as able.  - Use minimal single dosing of narcotic if absolutely needed for pain, no standing order.   - had some head, will add ultram 50 mg q 6 hrs prn with improved headaches  - she is more back to baseline, will attempt speech for swallow trials     Suicidal throughs/agitation  Depression  * With improvement in her mentation, she has started becoming at times restless and agitated. On 8/27, reported having suicidal thoughts. No plans/attempts.  * initiated suicide precautions and sitter,  evaluated by psychiatry service, discontinued.   - Reassess periodically for ongoing need.  - Anti depressant not recommended currently in the setting of delirium/encephalopathy.  - HS seroquel increased 8/31.  - Psych reconsulted on 9/8/22, restarted remeron (see consult note).     C. difficile colitis  Patient was noted to have copious diarrhea on 8/1 and was noted positive for C. Difficile.  * Rectal tube came off 8/28 still with some diarrhea but not severe  - Continue oral vancomycin - till 9/22.  - continue questran from 4g daily to BID, loose stools are improving  - continue probiotic  - barrier cream for skin breakdown     Acute on chronic anemia  Prior labs show baseline hemoglobin of about 9-10.   * Hb on admission was 11.7, slowly trended down, got 1 unit PRBC on 8/8/22 for Hb 6.9  * Received IV iron infusion x3 with first on 8/11/22.  - Iron panel on 7/30/22 showed that her total iron was low at 14, binding capacity was low at 28, iron saturation was 6 and B12 was high and folate was normal  * No obvious ongoing bleeding noted; Hb has now remained stable around 9 to 10 on 8/26  - Continue to monitor hgb periodically.     History of trigeminal neuralgia  - Continue PTA Trileptal , Keppra and Topamax.     History of chronic pyloric ulcer  She had EGD done in 2020 which showed gastroduodenitis with a duodenal stricture.  - Continue PPI.     Hyperglycemia, resolved  Recent HbA1c was 5.6 on 7/19/22. Was not on any medication prior to admission.  - Continue medium dose sliding scale insulin.  - Requiring no supplemental insulin and no hypoglycemia, glucose checks q8h during the day and at 0200.  - Decreased lantus to 8 units nightly 9/2.  - Blood sugars well controlled.      Intermittent Hypokalemia, Hypo/hypernatremia, Hypophosphatemia  - Replacement per protocol.     Hypernatremia: resolved.  - Continue free water flushes 200 ml every 4 hrs.     Severe dysphagia  Severe protein calorie malnutrition  -  Nutrition following for tube feeds via G tube (placed 8/16) ; getting free water flushes.  - Limited re-evaluation 9/5 by SLP; recommend continued NPO status.     Physical deconditioning  - Will need TCU placement; declined by LTACH.  - PT/OT signed off due to lack of patient participation.  - SW following for disposition.     Diet: NPO for Medical/Clinical Reasons Except for: Other; Specify: NPO For oral intake and gastric feedings for 6 hours post insertion Gastrostomy G/GJ tube. May feed through Jejunal or Distal PORT immediately for GJ tubes ONLY.  Adult Formula Drip Feeding: Continuous Vital 1.5; Gastrostomy; Goal Rate: 50; mL/hr; Medication - Feeding Tube Flush Frequency: At least 15-30 mL water before and after medication administration and with tube clogging; Amount to Send (Nutrition us...    -- start speech for swallow evaluation    DVT Prophylaxis: Pneumatic Compression Devices  Abrams Catheter: Not present  Central Lines: PRESENT  PICC Triple Lumen 07/22/22 Right Basilic Vascular access-Site Assessment: WDL  Cardiac Monitoring: None  Code Status: Full Code       Disposition Plan  - Will need TCU placement; declined by LTACH.  - PT/OT signed off due to lack of patient participation.  - SW following for disposition.      Interval History   -- headache better  -- decrease in loose stools  -- she is alert and follows commands    -Data reviewed today: I reviewed all new labs and imaging over the last 24 hours. I personally reviewed no images or EKG's today.    Physical Exam    , Blood pressure 111/58, pulse 81, temperature 97.9  F (36.6  C), temperature source Axillary, resp. rate 20, weight 50.1 kg (110 lb 7.2 oz), SpO2 96 %, not currently breastfeeding.  Vitals:    09/04/22 0618 09/05/22 0552 09/07/22 0500   Weight: 50.6 kg (111 lb 8.8 oz) 50.3 kg (110 lb 14.3 oz) 50.1 kg (110 lb 7.2 oz)     Vital Signs with Ranges  Temp:  [96.8  F (36  C)-98.2  F (36.8  C)] 97.9  F (36.6  C)  Pulse:  [71-87] 81  Resp:   [16-20] 20  BP: (110-128)/(52-70) 111/58  SpO2:  [96 %-100 %] 96 %  I/O's Last 24 hours  I/O last 3 completed shifts:  In: 670 [NG/GT:670]  Out: -     Constitutional:  no apparent distress  Respiratory: Clear to auscultation bilaterally, no crackles or wheezing  Cardiovascular: Regular rate and rhythm, normal S1 and S2,   GI: Normal bowel sounds, soft, non-distended, non-tender  Skin/Integumen: No rashes, no cyanosis, no edema  Other:      Medications   All medications were reviewed.      banatrol plus  1 packet Per Feeding Tube TID w/meals     cholestyramine  1 packet Oral BID     insulin aspart  1-6 Units Subcutaneous Q8H     insulin glargine  8 Units Subcutaneous At Bedtime     levETIRAcetam  1,000 mg Oral or Feeding Tube Q12H     miconazole with skin protectant   Topical BID     mirtazapine  7.5 mg Oral or Feeding Tube At Bedtime     multivitamins w/minerals  15 mL Per Feeding Tube Daily     OXcarbazepine  450 mg Oral or Feeding Tube At Bedtime     pantoprazole  40 mg Oral or Feeding Tube QAM AC     QUEtiapine  50 mg Oral At Bedtime     saccharomyces boulardii  250 mg Per Feeding Tube BID     sodium chloride (PF)  10-40 mL Intracatheter Q8H     sodium chloride (PF)  10-40 mL Intracatheter Q7 Days     topiramate  50 mg Oral or Feeding Tube At Bedtime     vancomycin  125 mg Oral or Feeding Tube 4x Daily        Data   Recent Labs   Lab 09/12/22  1329 09/12/22  0842 09/12/22  0540 09/11/22  1326 09/11/22  0538 09/10/22  1230 09/10/22  1040   WBC  --   --  9.8  --   --   --   --    HGB  --   --  10.0*  --   --   --   --    MCV  --   --  82  --   --   --   --    PLT  --   --  437  --   --   --   --    NA  --   --  136  --   --   --   --    POTASSIUM  --   --  4.3  --  4.3  --  3.8   CHLORIDE  --   --  102  --   --   --   --    CO2  --   --  27  --   --   --   --    BUN  --   --  19  --   --   --   --    CR  --   --  0.51*  --   --   --   --    ANIONGAP  --   --  7  --   --   --   --    ADY  --   --  9.1  --   --   --    --    * 129* 126*   < >  --    < >  --     < > = values in this interval not displayed.       No results found for this or any previous visit (from the past 24 hour(s)).    Sekou Harvey MD  Text Page  (7am to 6pm)

## 2022-09-12 NOTE — PLAN OF CARE
Reason for Admission: L brain abscess, sepsis, encephalopathy  Cognitive/Mentation: A/Ox 2, disoriented to time and situation.   Neuros/CMS: Stable with generalized weakness. Following minimal commands. Spontaneously moving all extremities.   VS: VSS on RA  Tele: N/A  GI: BS audible, + flatus, last BM 9/11/22. Incontinent.  : Purewick in place, good output. Incontinent.  Pulmonary: LS diminished  Pain: HA and body pain, prn Tylenol given.    Drains/Lines: R triple lumen picc, SL. PEG tube infusing TF.   Skin: Intact, redness in perineum area- skin barrier applied. Mepilex in place.   Activity: Assist x 2 with lift device.  Diet: NPO diet with tube feedings running at goal of 50 mL/hr with q4hr 200 mL flushes. Takes pills crushed in peg tube.    Therapies recs: TCU  Discharge: Pending  Aggression Stoplight Tool: Green  End of shift summary: No changes this shift. MRSA/C-diff precautions maintained- vanco continued until 9/22. PRN cream available for itching.

## 2022-09-12 NOTE — PLAN OF CARE
Goal Outcome Evaluation:    Plan of Care Reviewed With: patient     Overall Patient Progress: no change    Pt here with sepsis from covid 19, subsequent imaging revealed L frontal mass and 7/31 pt had L frontal ventriculostomy. A&Ox2, disoriented to time and situation. Neuros: generalized weakness. NPO. TF Vital 1.5 @50mL/hr goal rate, flushes 200mL q4. TF tubing changed out 1600. Takes pills via PEG. Up with A2 lift. CDIF precautions- continue vanco until 9/22. Loose BM x1 this shift. Incontinent of urine, changed frequently, pt able to make needs known, no purewick r/t loose stools. Pt w/ significant c/o pain, MD aware, pt c/o HA and itching. PRN tylenol and tramadol given, ice packs, repositioning, rest encouraged. PRN cream for itching applied. Pt states more comfortable at this time and is aware of pain management plan of care. Plan TCU. Discharge pending placement.

## 2022-09-12 NOTE — PLAN OF CARE
"Goal Outcome Evaluation:      Problem: Plan of Care - These are the overarching goals to be used throughout the patient stay.    Goal: Plan of Care Review/Shift Note  Description: The Plan of Care Review/Shift note should be completed every shift.  The Outcome Evaluation is a brief statement about your assessment that the patient is improving, declining, or no change.  This information will be displayed automatically on your shift note.  Outcome: Ongoing, Progressing  Goal: Patient-Specific Goal (Individualized)  Description: You can add care plan individualizations to a care plan. Examples of Individualization might be:  \"Parent requests to be called daily at 9am for status\", \"I have a hard time hearing out of my right ear\", or \"Do not touch me to wake me up as it startles me\".  Outcome: Ongoing, Progressing  Goal: Absence of Hospital-Acquired Illness or Injury  Outcome: Ongoing, Progressing  Intervention: Identify and Manage Fall Risk  Recent Flowsheet Documentation  Taken 9/12/2022 1000 by Glo Polanco RN  Safety Promotion/Fall Prevention:   safety round/check completed   bed alarm on  Intervention: Prevent Skin Injury  Recent Flowsheet Documentation  Taken 9/12/2022 1000 by Glo Polanco RN  Body Position: supine, head elevated  Intervention: Prevent and Manage VTE (Venous Thromboembolism) Risk  Recent Flowsheet Documentation  Taken 9/12/2022 1000 by Glo Polanco RN  Range of Motion: ROM (range of motion) performed  Activity Management: activity adjusted per tolerance  Intervention: Prevent Infection  Recent Flowsheet Documentation  Taken 9/12/2022 1000 by Glo Polanco RN  Infection Prevention:   rest/sleep promoted   single patient room provided   hand hygiene promoted   personal protective equipment utilized      End of shift note:   Patient safety maintained throughout shift with bed low and locked and call bell within reach.  Patient tolersting tube feeds.  BG wnl.  TF tubing changed.  " Incontinence care performed as needed. Q2 hour turns.

## 2022-09-13 LAB
GLUCOSE BLDC GLUCOMTR-MCNC: 105 MG/DL (ref 70–99)
GLUCOSE BLDC GLUCOMTR-MCNC: 117 MG/DL (ref 70–99)
GLUCOSE BLDC GLUCOMTR-MCNC: 126 MG/DL (ref 70–99)
GLUCOSE BLDC GLUCOMTR-MCNC: 126 MG/DL (ref 70–99)
GLUCOSE BLDC GLUCOMTR-MCNC: 137 MG/DL (ref 70–99)

## 2022-09-13 PROCEDURE — 250N000013 HC RX MED GY IP 250 OP 250 PS 637: Performed by: INTERNAL MEDICINE

## 2022-09-13 PROCEDURE — 120N000001 HC R&B MED SURG/OB

## 2022-09-13 PROCEDURE — 250N000013 HC RX MED GY IP 250 OP 250 PS 637: Performed by: HOSPITALIST

## 2022-09-13 PROCEDURE — 250N000013 HC RX MED GY IP 250 OP 250 PS 637

## 2022-09-13 PROCEDURE — 99232 SBSQ HOSP IP/OBS MODERATE 35: CPT | Performed by: INTERNAL MEDICINE

## 2022-09-13 RX ADMIN — Medication 15 ML: at 09:00

## 2022-09-13 RX ADMIN — VANCOMYCIN HYDROCHLORIDE 125 MG: KIT at 15:02

## 2022-09-13 RX ADMIN — TRAMADOL HYDROCHLORIDE 50 MG: 50 TABLET, COATED ORAL at 21:41

## 2022-09-13 RX ADMIN — CHOLESTYRAMINE 4 G: 4 POWDER, FOR SUSPENSION ORAL at 08:24

## 2022-09-13 RX ADMIN — MIRTAZAPINE 7.5 MG: 7.5 TABLET, FILM COATED ORAL at 21:41

## 2022-09-13 RX ADMIN — ANORECTAL OINTMENT: 15.7; .44; 24; 20.6 OINTMENT TOPICAL at 10:39

## 2022-09-13 RX ADMIN — VANCOMYCIN HYDROCHLORIDE 125 MG: KIT at 09:00

## 2022-09-13 RX ADMIN — Medication 3 MG: at 21:41

## 2022-09-13 RX ADMIN — TRAMADOL HYDROCHLORIDE 50 MG: 50 TABLET, COATED ORAL at 08:24

## 2022-09-13 RX ADMIN — TOPIRAMATE 50 MG: 50 TABLET ORAL at 21:41

## 2022-09-13 RX ADMIN — ANORECTAL OINTMENT: 15.7; .44; 24; 20.6 OINTMENT TOPICAL at 21:42

## 2022-09-13 RX ADMIN — QUETIAPINE FUMARATE 50 MG: 50 TABLET ORAL at 21:41

## 2022-09-13 RX ADMIN — Medication 250 MG: at 21:41

## 2022-09-13 RX ADMIN — MICONAZOLE NITRATE: 20 CREAM TOPICAL at 15:10

## 2022-09-13 RX ADMIN — GUAIFENESIN 10 ML: 200 SOLUTION ORAL at 08:24

## 2022-09-13 RX ADMIN — VANCOMYCIN HYDROCHLORIDE 125 MG: KIT at 17:38

## 2022-09-13 RX ADMIN — CHOLESTYRAMINE 4 G: 4 POWDER, FOR SUSPENSION ORAL at 21:41

## 2022-09-13 RX ADMIN — LEVETIRACETAM 1000 MG: 100 SOLUTION ORAL at 21:41

## 2022-09-13 RX ADMIN — VANCOMYCIN HYDROCHLORIDE 125 MG: KIT at 21:40

## 2022-09-13 RX ADMIN — Medication 1 PACKET: at 15:02

## 2022-09-13 RX ADMIN — GUAIFENESIN 10 ML: 200 SOLUTION ORAL at 15:02

## 2022-09-13 RX ADMIN — Medication 1 PACKET: at 15:11

## 2022-09-13 RX ADMIN — OXCARBAZEPINE 450 MG: 300 SUSPENSION ORAL at 21:40

## 2022-09-13 RX ADMIN — TRAMADOL HYDROCHLORIDE 50 MG: 50 TABLET, COATED ORAL at 15:02

## 2022-09-13 RX ADMIN — Medication 1 PACKET: at 17:33

## 2022-09-13 RX ADMIN — Medication 250 MG: at 08:24

## 2022-09-13 RX ADMIN — Medication 40 MG: at 09:00

## 2022-09-13 RX ADMIN — LEVETIRACETAM 1000 MG: 100 SOLUTION ORAL at 08:59

## 2022-09-13 RX ADMIN — INSULIN GLARGINE 8 UNITS: 100 INJECTION, SOLUTION SUBCUTANEOUS at 21:42

## 2022-09-13 ASSESSMENT — ACTIVITIES OF DAILY LIVING (ADL)
ADLS_ACUITY_SCORE: 42
ADLS_ACUITY_SCORE: 46
ADLS_ACUITY_SCORE: 42
ADLS_ACUITY_SCORE: 42
ADLS_ACUITY_SCORE: 46
ADLS_ACUITY_SCORE: 42
ADLS_ACUITY_SCORE: 46
ADLS_ACUITY_SCORE: 42

## 2022-09-13 NOTE — PLAN OF CARE
"  Problem: Plan of Care - These are the overarching goals to be used throughout the patient stay.    Goal: Patient-Specific Goal (Individualized)  Description: You can add care plan individualizations to a care plan. Examples of Individualization might be:  \"Parent requests to be called daily at 9am for status\", \"I have a hard time hearing out of my right ear\", or \"Do not touch me to wake me up as it startles me\".  Outcome: Ongoing, Progressing  Goal: Absence of Hospital-Acquired Illness or Injury  Outcome: Ongoing, Progressing  Goal: Optimal Comfort and Wellbeing  Outcome: Ongoing, Progressing  Goal: Readiness for Transition of Care  Outcome: Ongoing, Progressing     Problem: Aspiration (Enteral Nutrition)  Goal: Absence of Aspiration Signs and Symptoms  Outcome: Ongoing, Progressing     Problem: Feeding Intolerance (Enteral Nutrition)  Goal: Feeding Tolerance  Outcome: Ongoing, Progressing   Goal Outcome Evaluation:                      "

## 2022-09-13 NOTE — PROGRESS NOTES
Pt Handoff received by night shift RN.  Pt is in bed resting comfortably with no signs of distress or discomfort. Pt has been oriented to place, time, call bell use and rounding.   Call bell is in reach and safety precautions are in place. Will continue to monitor.

## 2022-09-14 ENCOUNTER — APPOINTMENT (OUTPATIENT)
Dept: SPEECH THERAPY | Facility: CLINIC | Age: 77
DRG: 023 | End: 2022-09-14
Attending: INTERNAL MEDICINE
Payer: COMMERCIAL

## 2022-09-14 LAB
GLUCOSE BLDC GLUCOMTR-MCNC: 112 MG/DL (ref 70–99)
GLUCOSE BLDC GLUCOMTR-MCNC: 115 MG/DL (ref 70–99)
GLUCOSE BLDC GLUCOMTR-MCNC: 120 MG/DL (ref 70–99)
GLUCOSE BLDC GLUCOMTR-MCNC: 129 MG/DL (ref 70–99)
PHOSPHATE SERPL-MCNC: 4.2 MG/DL (ref 2.5–4.5)
POTASSIUM BLD-SCNC: 4.2 MMOL/L (ref 3.4–5.3)

## 2022-09-14 PROCEDURE — 84132 ASSAY OF SERUM POTASSIUM: CPT | Performed by: INTERNAL MEDICINE

## 2022-09-14 PROCEDURE — 250N000013 HC RX MED GY IP 250 OP 250 PS 637

## 2022-09-14 PROCEDURE — 36415 COLL VENOUS BLD VENIPUNCTURE: CPT | Performed by: INTERNAL MEDICINE

## 2022-09-14 PROCEDURE — 99232 SBSQ HOSP IP/OBS MODERATE 35: CPT | Performed by: INTERNAL MEDICINE

## 2022-09-14 PROCEDURE — 250N000013 HC RX MED GY IP 250 OP 250 PS 637: Performed by: INTERNAL MEDICINE

## 2022-09-14 PROCEDURE — 250N000013 HC RX MED GY IP 250 OP 250 PS 637: Performed by: HOSPITALIST

## 2022-09-14 PROCEDURE — 92526 ORAL FUNCTION THERAPY: CPT | Mod: GN | Performed by: SPEECH-LANGUAGE PATHOLOGIST

## 2022-09-14 PROCEDURE — 120N000001 HC R&B MED SURG/OB

## 2022-09-14 PROCEDURE — 84100 ASSAY OF PHOSPHORUS: CPT | Performed by: INTERNAL MEDICINE

## 2022-09-14 RX ADMIN — OXCARBAZEPINE 450 MG: 300 SUSPENSION ORAL at 21:20

## 2022-09-14 RX ADMIN — ANORECTAL OINTMENT: 15.7; .44; 24; 20.6 OINTMENT TOPICAL at 21:16

## 2022-09-14 RX ADMIN — Medication 15 ML: at 08:39

## 2022-09-14 RX ADMIN — LEVETIRACETAM 1000 MG: 100 SOLUTION ORAL at 21:20

## 2022-09-14 RX ADMIN — VANCOMYCIN HYDROCHLORIDE 125 MG: KIT at 18:14

## 2022-09-14 RX ADMIN — INSULIN GLARGINE 8 UNITS: 100 INJECTION, SOLUTION SUBCUTANEOUS at 21:15

## 2022-09-14 RX ADMIN — CHOLESTYRAMINE 4 G: 4 POWDER, FOR SUSPENSION ORAL at 08:39

## 2022-09-14 RX ADMIN — TOPIRAMATE 50 MG: 50 TABLET ORAL at 21:20

## 2022-09-14 RX ADMIN — Medication 250 MG: at 08:39

## 2022-09-14 RX ADMIN — VANCOMYCIN HYDROCHLORIDE 125 MG: KIT at 12:45

## 2022-09-14 RX ADMIN — VANCOMYCIN HYDROCHLORIDE 125 MG: KIT at 08:39

## 2022-09-14 RX ADMIN — LEVETIRACETAM 1000 MG: 100 SOLUTION ORAL at 08:39

## 2022-09-14 RX ADMIN — MIRTAZAPINE 7.5 MG: 7.5 TABLET, FILM COATED ORAL at 21:16

## 2022-09-14 RX ADMIN — Medication 250 MG: at 21:15

## 2022-09-14 RX ADMIN — CHOLESTYRAMINE 4 G: 4 POWDER, FOR SUSPENSION ORAL at 21:15

## 2022-09-14 RX ADMIN — MICONAZOLE NITRATE: 20 CREAM TOPICAL at 21:16

## 2022-09-14 RX ADMIN — VANCOMYCIN HYDROCHLORIDE 125 MG: KIT at 21:20

## 2022-09-14 RX ADMIN — QUETIAPINE FUMARATE 50 MG: 50 TABLET ORAL at 21:15

## 2022-09-14 RX ADMIN — MICONAZOLE NITRATE: 20 CREAM TOPICAL at 09:46

## 2022-09-14 RX ADMIN — TRAMADOL HYDROCHLORIDE 50 MG: 50 TABLET, COATED ORAL at 09:40

## 2022-09-14 RX ADMIN — TRAMADOL HYDROCHLORIDE 50 MG: 50 TABLET, COATED ORAL at 21:15

## 2022-09-14 RX ADMIN — Medication 3 MG: at 21:14

## 2022-09-14 RX ADMIN — Medication 1 PACKET: at 12:44

## 2022-09-14 RX ADMIN — Medication 1 PACKET: at 08:39

## 2022-09-14 RX ADMIN — GUAIFENESIN 10 ML: 200 SOLUTION ORAL at 09:40

## 2022-09-14 RX ADMIN — Medication 40 MG: at 08:39

## 2022-09-14 RX ADMIN — Medication 1 PACKET: at 18:13

## 2022-09-14 ASSESSMENT — ACTIVITIES OF DAILY LIVING (ADL)
ADLS_ACUITY_SCORE: 48
ADLS_ACUITY_SCORE: 48
ADLS_ACUITY_SCORE: 44
ADLS_ACUITY_SCORE: 44
ADLS_ACUITY_SCORE: 48
ADLS_ACUITY_SCORE: 46
ADLS_ACUITY_SCORE: 44
ADLS_ACUITY_SCORE: 46

## 2022-09-14 NOTE — PROGRESS NOTES
"CLINICAL NUTRITION SERVICES - REASSESSMENT NOTE      Malnutrition: (8/25)  % Weight Loss: > 10% in 6 months (severe malnutrition) - per 8/4 RD note  % Intake:  No decreased intake noted - pt meeting needs from EN  Subcutaneous Fat Loss:  Orbital region moderate depletion - per 8/4 RD note  Muscle Loss:  Temporal region moderate depletion, Clavicle bone region moderate depletion, Acromion bone region moderate-severe depletion and Dorsal hand region severe depletion - per 8/4 RD note  Fluid Retention:  None noted     Malnutrition Diagnosis: Severe malnutrition  In Context of:  Acute illness or injury  Chronic illness or disease       EVALUATION OF PROGRESS TOWARD GOALS   Diet:    NPO    Nutrition Support:    Type of Feeding Tube: 18 Fr PEG (placed 8/16)  Enteral Frequency:  Continuous  Enteral Regimen: Vital 1.5 @ 50 mL/hr (semi-elemental formula)  Enteral Provisions: 1800 cals, 82 gm pro (1.7 gm/kg), 7 gm fiber, 917 mL free water, 224 gm CHO  1 packet Banatrol TID = 120 cals, 6 gm fiber  Free Water Flush: 200 mL every 4 hrs (8/20 - MD order)     Total provisions = 1920 kcal (40 kcal/kg), 13 g fiber, 2117 mL free water   Liquid MVI/M daily     Intake/Tolerance:    Chart reviewed    9/13: BM x1  Note that pt started Questran and Florastor on 9/11  Per MD, \"stools firmer\"    9/12: SLP --> Patient with limited participation the past 2 sessions. Continue NPO with TF and frequent oral cares.       ASSESSED NUTRITION NEEDS:  Dosing Weight 48.3 kg (7/27 - admit wt)  Estimated Energy Needs: 2526-9054 kcals (35-40 Kcal/Kg)  Justification: repletion and underweight  Estimated Protein Needs: 70-95 grams protein (1.5-2 g pro/Kg)  Justification: repletion       NEW FINDINGS:       09/14/22 0616 49 kg (108 lb) Bed scale   09/07/22 0500 50.1 kg (110 lb 7.2 oz) Bed scale   09/05/22 0552 50.3 kg (110 lb 14.3 oz) Bed scale   09/04/22 0618 50.6 kg (111 lb 8.8 oz) Bed scale   08/31/22 0500 48 kg (105 lb 13.1 oz) Bed scale   08/25/22 0610 " 48.3 kg (106 lb 7.7 oz) Bed scale   08/24/22 0629 48.3 kg (106 lb 7.7 oz) Bed scale       07/28/22 0600 49.5 kg (109 lb 2 oz) Bed scale   07/28/22 0000 -- Bed scale   07/27/22 2100 48.3 kg (106 lb 7.7 oz) Bed scale         Previous Goals (9/9):   EN to meet % estimated needs  Evaluation: Met    Stooling to decrease to <3 BM perday  Evaluation: Met    Previous Nutrition Diagnosis (9/9):   Altered GI function related to ongoing diarrhea from prior CDiff infection, medication side effects and/or unknown etiology as evidenced by loose BMs x4-10 per day w/o improvement from nutritional interventions (semi-elemental formula and Banatrol soluble fiber supplement)  Evaluation: Improving          CURRENT NUTRITION DIAGNOSIS  No nutrition diagnosis identified at this time       INTERVENTIONS  Recommendations / Nutrition Prescription  NPO - diet per SLP    Recommend continue with current EN regimen      Goals  EN to meet % est needs  Pt to have no more than 3 BM per day      MONITORING AND EVALUATION:  Progress towards goals will be monitored and evaluated per protocol and Practice Guidelines

## 2022-09-14 NOTE — PROGRESS NOTES
Reason for Admission: L brain abscess, sepsis, encephalopathy  Cognitive/Mentation: A/Ox 2, disoriented to time and situation.   Neuros/CMS: Stable with generalized weakness. Following minimal commands. Spontaneously moving all extremities.   VS: VSS on RA  Tele: N/A  GI: BS audible, + flatus, last BM 9/14/22. Incontinent.  : Refuses purewick  Incontinent.  Pulmonary: LS diminished  Pain: HA and body pain, prn Tylenol given, and tramadol given.   Drains/Lines:SL. PEG tube infusing TF.   Skin: Intact, redness in perineum area- skin barrier applied.  Activity: Assist x 2 with lift device.  Diet: NPO diet with tube feedings running at goal of 50 mL/hr with q4hr 200 mL flushes. Takes pills crushed in peg tube.    Therapies recs: TCU  Discharge: Pending  Aggression Stoplight Tool: Green  End of shift summary: No changes this shift. MRSA/C-diff precautions maintained- vanco continued until 9/22. PRN

## 2022-09-14 NOTE — PROGRESS NOTES
Handoff report received from night shift RN. Pt is in bed asleep with no signs of distress or discomfort. Pt call light is in reach bed alarm is active.

## 2022-09-14 NOTE — PLAN OF CARE
Goal Outcome Evaluation:          Overall Patient Progress: no change    Outcome Evaluation: NPO (diet per SLP).  Continues on EN: Vital 1.5 @ 50 mL/hr.  Banatrol 1 packet TID.  No changes at this time.

## 2022-09-14 NOTE — PROGRESS NOTES
[facility]  Hospitalist Progress Note  Sekou Harvey MD  09/14/2022    Assessment & Plan         Julisa Chaney is a 77 year old female admitted on 7/27/2022.  PMH of trigeminal neuralgia who was recently admitted to [facility] on 7/19 for severe sepsis due to COVID-19 infection and suspected bacterial pneumonia. She was treated with 5 days of remdesivir and did not require any steroids, also treated with IV vancomycin and IV meropenem.     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  She was switched to IV vancomycin, cefepime and metronidazole and transferred to [facility] for neurosurgery assessment. MRI brain w/wo contrast 7/30 showed ventriculitis with probable abscess. Neurosurgery/neurology/infectious disease/oncology consulted. Patient taken to the OR on 7/31 for left frontal ventriculostomy.   Since then has remained on broad spectrum antibiotics.  In addition she has developed C. Difficile colitis and is on treatment.  She has continued to have a significant encephalopathy which is very slowly improving.     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  * flow cytometry did not show any evidence of malignancy.  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 The left temporal horn remains slightly dilated but no samuel hydrocephalus. No acute changes.  - Remains afebrile; leukocytosis resolved ; was initially on vancomycin, cefepime, and metronidazole, then Meropenem and Vancomycin; ID signed off, completed 6 weeks treatment until 9/8/22.  - Continue scheduled Tylenol 650 mg TID. PRN ibuprofen ordered.  - Trying to avoid any narcotics with her confusion/ encephalopathy.  - PT/OT consulted, however patient does not put much effort into therapies currently and PT/OT signing off for now.  - Progress has been slow and fairly minimal. Concern about overall prognosis. Briefly discussed palliative care with patient's daughter on 9/6/22. She requested re-evaluation from psychiatry regarding depression (completed 9/8/22, see consult note). Seems to want to see how she does with the medication prior to having any discussion about palliative care.       Acute infectious encephalopathy due to above:  * mentation much improved since admission ; fluctuating at times ; will not engage in conversation most of the times.  * 8/23 discontinued prn benadryl, narcotics; minimize narcotic.  - Continue to treat underlying issues as noted above.   - Maintain normal sleep/wake cycle as able.  - Use minimal single dosing of narcotic if absolutely needed for pain, no standing order.   - had some head, will add ultram 50 mg q 6 hrs prn with improved headaches  - she is more back to baseline, will attempt speech for swallow trials     Suicidal throughs/agitation  Depression  * With improvement in her mentation, she has started becoming at times restless and agitated. On 8/27, reported having suicidal thoughts. No plans/attempts.  * initiated suicide precautions and sitter,  evaluated by psychiatry service, discontinued.   - Reassess periodically for ongoing need.  - Anti depressant not recommended currently in the setting of delirium/encephalopathy.  - HS seroquel increased 8/31.  - Psych reconsulted on 9/8/22, restarted remeron (see consult note).     C. difficile colitis  Patient was noted to have copious diarrhea on 8/1 and was noted positive for C. Difficile.  * Rectal tube came off 8/28 still with some diarrhea but not severe  - Continue oral vancomycin - till 9/22.  - continue questran from 4g daily to BID, loose stools are improving  - continue probiotic  - barrier cream for skin breakdown     Acute on chronic anemia  Prior labs show baseline hemoglobin of about 9-10.   * Hb on admission was 11.7, slowly trended down, got 1 unit PRBC on 8/8/22 for Hb 6.9  * Received IV iron infusion x3 with first on 8/11/22.  - Iron panel on 7/30/22 showed that her total iron was low at 14, binding capacity was low at 28, iron saturation was 6 and B12 was high and folate was normal  * No obvious ongoing bleeding noted; Hb has now remained stable around 9 to 10 on 8/26  - Continue to monitor hgb periodically.     History of trigeminal neuralgia  - Continue PTA Trileptal , Keppra and Topamax.     History of chronic pyloric ulcer  She had EGD done in 2020 which showed gastroduodenitis with a duodenal stricture.  - Continue PPI.     Hyperglycemia, resolved  Recent HbA1c was 5.6 on 7/19/22. Was not on any medication prior to admission.  - Continue medium dose sliding scale insulin.  - Requiring no supplemental insulin and no hypoglycemia, glucose checks q8h during the day and at 0200.  - Decreased lantus to 8 units nightly 9/2.  - Blood sugars well controlled.      Intermittent Hypokalemia, Hypo/hypernatremia, Hypophosphatemia  - Replacement per protocol.     Hypernatremia: resolved.  - Continue free water flushes 200 ml every 4 hrs.     Severe dysphagia  Severe protein calorie malnutrition  -  Nutrition following for tube feeds via G tube (placed 8/16) ; getting free water flushes.  - Limited re-evaluation 9/5 by SLP; recommend continued NPO status. Pending re-evaluation.  - patient had apparently refused speech eval the last 2 days but when asked is saying she is willing for speech therapy for swallow trials.     Physical deconditioning  - Will need TCU placement; declined by LTACH.  - PT/OT signed off due to lack of patient participation.  - SW following for disposition.     Diet: NPO for Medical/Clinical Reasons Except for: Other; Specify: NPO For oral intake and gastric feedings for 6 hours post insertion Gastrostomy G/GJ tube. May feed through Jejunal or Distal PORT immediately for GJ tubes ONLY.  Adult Formula Drip Feeding: Continuous Vital 1.5; Gastrostomy; Goal Rate: 50; mL/hr; Medication - Feeding Tube Flush Frequency: At least 15-30 mL water before and after medication administration and with tube clogging; Amount to Send (Nutrition us...    -- awaiting speech for swallow evaluation and initiation of diet.    DVT Prophylaxis: Pneumatic Compression Devices  Abrams Catheter: Not present  Central Lines: PRESENT  PICC Triple Lumen 07/22/22 Right Basilic Vascular access-Site Assessment: WDL  Cardiac Monitoring: None  Code Status: Full Code       Disposition Plan  - Will need TCU placement; declined by LTACH.  - PT/OT signed off due to lack of patient participation.  - SW following for disposition.      Interval History   -- discussed with speech  -- no acute overnight issues    -Data reviewed today: I reviewed all new labs and imaging over the last 24 hours. I personally reviewed no images or EKG's today.    Physical Exam    , Blood pressure 116/58, pulse 77, temperature 97.9  F (36.6  C), temperature source Axillary, resp. rate 16, weight 49 kg (108 lb), SpO2 97 %, not currently breastfeeding.  Vitals:    09/05/22 0552 09/07/22 0500 09/14/22 0616   Weight: 50.3 kg (110 lb 14.3 oz) 50.1 kg (110 lb 7.2  oz) 49 kg (108 lb)     Vital Signs with Ranges  Temp:  [97.6  F (36.4  C)-98  F (36.7  C)] 97.9  F (36.6  C)  Pulse:  [77-87] 77  Resp:  [16-18] 16  BP: (100-129)/(58-66) 116/58  SpO2:  [96 %-100 %] 97 %  I/O's Last 24 hours  No intake/output data recorded.    Constitutional:  no apparent distress  Respiratory: Clear to auscultation bilaterally, no crackles or wheezing  Cardiovascular: Regular rate and rhythm, normal S1 and S2,   GI: Normal bowel sounds, soft, non-distended, non-tender  Skin/Integumen: No rashes, no cyanosis, no edema  Other:      Medications   All medications were reviewed.      banatrol plus  1 packet Per Feeding Tube TID w/meals     cholestyramine  1 packet Oral BID     insulin aspart  1-6 Units Subcutaneous Q8H     insulin glargine  8 Units Subcutaneous At Bedtime     levETIRAcetam  1,000 mg Oral or Feeding Tube Q12H     miconazole with skin protectant   Topical BID     mirtazapine  7.5 mg Oral or Feeding Tube At Bedtime     multivitamins w/minerals  15 mL Per Feeding Tube Daily     OXcarbazepine  450 mg Oral or Feeding Tube At Bedtime     pantoprazole  40 mg Oral or Feeding Tube QAM AC     QUEtiapine  50 mg Oral At Bedtime     saccharomyces boulardii  250 mg Per Feeding Tube BID     sodium chloride (PF)  10-40 mL Intracatheter Q8H     sodium chloride (PF)  10-40 mL Intracatheter Q7 Days     topiramate  50 mg Oral or Feeding Tube At Bedtime     vancomycin  125 mg Oral or Feeding Tube 4x Daily        Data   Recent Labs   Lab 09/14/22  1253 09/14/22  0540 09/13/22  2132 09/12/22  0842 09/12/22  0540 09/11/22  1326 09/11/22  0538 09/10/22  1230 09/10/22  1040   WBC  --   --   --   --  9.8  --   --   --   --    HGB  --   --   --   --  10.0*  --   --   --   --    MCV  --   --   --   --  82  --   --   --   --    PLT  --   --   --   --  437  --   --   --   --    NA  --   --   --   --  136  --   --   --   --    POTASSIUM  --   --   --   --  4.3  --  4.3  --  3.8   CHLORIDE  --   --   --   --  102  --    --   --   --    CO2  --   --   --   --  27  --   --   --   --    BUN  --   --   --   --  19  --   --   --   --    CR  --   --   --   --  0.51*  --   --   --   --    ANIONGAP  --   --   --   --  7  --   --   --   --    ADY  --   --   --   --  9.1  --   --   --   --    * 129* 126*   < > 126*   < >  --    < >  --     < > = values in this interval not displayed.       No results found for this or any previous visit (from the past 24 hour(s)).    Sekou Harvey MD  Text Page  (7am to 6pm)

## 2022-09-15 ENCOUNTER — APPOINTMENT (OUTPATIENT)
Dept: GENERAL RADIOLOGY | Facility: CLINIC | Age: 77
DRG: 023 | End: 2022-09-15
Attending: INTERNAL MEDICINE
Payer: COMMERCIAL

## 2022-09-15 ENCOUNTER — APPOINTMENT (OUTPATIENT)
Dept: SPEECH THERAPY | Facility: CLINIC | Age: 77
DRG: 023 | End: 2022-09-15
Attending: INTERNAL MEDICINE
Payer: COMMERCIAL

## 2022-09-15 LAB
GLUCOSE BLDC GLUCOMTR-MCNC: 111 MG/DL (ref 70–99)
GLUCOSE BLDC GLUCOMTR-MCNC: 148 MG/DL (ref 70–99)
GLUCOSE BLDC GLUCOMTR-MCNC: 98 MG/DL (ref 70–99)

## 2022-09-15 PROCEDURE — 250N000013 HC RX MED GY IP 250 OP 250 PS 637: Performed by: HOSPITALIST

## 2022-09-15 PROCEDURE — 120N000001 HC R&B MED SURG/OB

## 2022-09-15 PROCEDURE — 250N000013 HC RX MED GY IP 250 OP 250 PS 637

## 2022-09-15 PROCEDURE — 74230 X-RAY XM SWLNG FUNCJ C+: CPT

## 2022-09-15 PROCEDURE — 92611 MOTION FLUOROSCOPY/SWALLOW: CPT | Mod: GN | Performed by: SPEECH-LANGUAGE PATHOLOGIST

## 2022-09-15 PROCEDURE — 99232 SBSQ HOSP IP/OBS MODERATE 35: CPT | Performed by: INTERNAL MEDICINE

## 2022-09-15 PROCEDURE — 250N000013 HC RX MED GY IP 250 OP 250 PS 637: Performed by: INTERNAL MEDICINE

## 2022-09-15 RX ADMIN — INSULIN GLARGINE 8 UNITS: 100 INJECTION, SOLUTION SUBCUTANEOUS at 21:40

## 2022-09-15 RX ADMIN — VANCOMYCIN HYDROCHLORIDE 125 MG: KIT at 18:00

## 2022-09-15 RX ADMIN — TRAMADOL HYDROCHLORIDE 50 MG: 50 TABLET, COATED ORAL at 17:54

## 2022-09-15 RX ADMIN — VANCOMYCIN HYDROCHLORIDE 125 MG: KIT at 10:18

## 2022-09-15 RX ADMIN — ACETAMINOPHEN 650 MG: 325 SUSPENSION ORAL at 05:13

## 2022-09-15 RX ADMIN — Medication 1 PACKET: at 13:01

## 2022-09-15 RX ADMIN — MICONAZOLE NITRATE: 20 CREAM TOPICAL at 21:33

## 2022-09-15 RX ADMIN — Medication 1 PACKET: at 10:18

## 2022-09-15 RX ADMIN — LEVETIRACETAM 1000 MG: 100 SOLUTION ORAL at 10:18

## 2022-09-15 RX ADMIN — Medication 1 PACKET: at 18:00

## 2022-09-15 RX ADMIN — VANCOMYCIN HYDROCHLORIDE 125 MG: KIT at 14:09

## 2022-09-15 RX ADMIN — Medication 250 MG: at 21:13

## 2022-09-15 RX ADMIN — VANCOMYCIN HYDROCHLORIDE 125 MG: KIT at 21:30

## 2022-09-15 RX ADMIN — TRAMADOL HYDROCHLORIDE 50 MG: 50 TABLET, COATED ORAL at 10:31

## 2022-09-15 RX ADMIN — ACETAMINOPHEN 650 MG: 325 SUSPENSION ORAL at 12:59

## 2022-09-15 RX ADMIN — QUETIAPINE 12.5 MG: 25 TABLET, FILM COATED ORAL at 14:09

## 2022-09-15 RX ADMIN — MICONAZOLE NITRATE: 20 CREAM TOPICAL at 10:19

## 2022-09-15 RX ADMIN — CHOLESTYRAMINE 4 G: 4 POWDER, FOR SUSPENSION ORAL at 10:18

## 2022-09-15 RX ADMIN — Medication 15 ML: at 10:18

## 2022-09-15 RX ADMIN — OXCARBAZEPINE 450 MG: 300 SUSPENSION ORAL at 21:31

## 2022-09-15 RX ADMIN — LEVETIRACETAM 1000 MG: 100 SOLUTION ORAL at 21:30

## 2022-09-15 RX ADMIN — CHOLESTYRAMINE 4 G: 4 POWDER, FOR SUSPENSION ORAL at 21:14

## 2022-09-15 RX ADMIN — ACETAMINOPHEN 650 MG: 325 SUSPENSION ORAL at 21:14

## 2022-09-15 RX ADMIN — QUETIAPINE FUMARATE 50 MG: 50 TABLET ORAL at 21:13

## 2022-09-15 RX ADMIN — Medication 250 MG: at 10:18

## 2022-09-15 RX ADMIN — INSULIN ASPART 1 UNITS: 100 INJECTION, SOLUTION INTRAVENOUS; SUBCUTANEOUS at 10:20

## 2022-09-15 RX ADMIN — TOPIRAMATE 50 MG: 50 TABLET ORAL at 21:13

## 2022-09-15 RX ADMIN — MIRTAZAPINE 7.5 MG: 7.5 TABLET, FILM COATED ORAL at 21:13

## 2022-09-15 RX ADMIN — Medication 40 MG: at 10:19

## 2022-09-15 ASSESSMENT — ACTIVITIES OF DAILY LIVING (ADL)
ADLS_ACUITY_SCORE: 44
ADLS_ACUITY_SCORE: 41
ADLS_ACUITY_SCORE: 44
ADLS_ACUITY_SCORE: 41
ADLS_ACUITY_SCORE: 45
ADLS_ACUITY_SCORE: 48
ADLS_ACUITY_SCORE: 41
ADLS_ACUITY_SCORE: 43
ADLS_ACUITY_SCORE: 43
ADLS_ACUITY_SCORE: 41

## 2022-09-15 NOTE — PROGRESS NOTES
"   09/15/22 1255   General Information   Onset of Illness/Injury or Date of Surgery 07/27/22   Referring Physician Sekou Yeboah MD   Patient/Family Therapy Goal Statement (SLP) did not state   Pertinent History of Current Problem \"Julisa Chaney is a 77 year old female admitted on 7/27/2022.  PMH of trigeminal neuralgia who was recently admitted to [facility] on 7/19 for severe sepsis due to COVID-19 infection and suspected bacterial pneumonia. She was treated with 5 days of remdesivir and did not require any steroids, also treated with IV vancomycin and IV meropenem. 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  She was switched to IV vancomycin, cefepime and metronidazole and transferred to [facility] for neurosurgery assessment. MRI brain w/wo contrast 7/30 showed ventriculitis with probable abscess. Neurosurgery/neurology/infectious disease/oncology consulted. Patient taken to the OR on 7/31 for left frontal ventriculostomy.   Since then has remained on broad spectrum antibiotics.  In addition she has developed C. Difficile colitis and is on treatment.  She has continued to have a significant encephalopathy. S/p left frontal ventriculostomy 7/31/22. - Nutrition following for tube feeds via G tube (placed 8/16) ; getting free water flushes - Will need SLP consult when more able to participate\" Soft and bite sized and mildly thick liquids recommended on clinical evaluation.   General Observations Pt alert, unable to sit fully upright in Video chair. Calling out for help, reported pain, inability to breathe, high anxiety, but easily redirectable with frequent cues and encouragement   Past History of Dysphagia Known silent aspiration with thin " liquids on VFSS 7/20.   Type of Evaluation   Type of Evaluation Swallow Evaluation   Oral Motor   Oral Musculature unable to assess due to poor participation/comprehension   General Swallowing Observations   Current Diet/Method of Nutritional Intake (General Swallowing Observations, NIS) soft & bite-sized (level 6);mildly thick liquids (level 2)   Swallowing Evaluation Videofluoroscopic swallow study (VFSS)   Respiratory Support (General Swallowing Observations) none   VFSS Evaluation   VFSS Textures Trialed pureed;soft & bite-sized   VFSS Eval: Thin Liquid Texture Trial   Mode of Presentation, Thin Liquid straw;fed by clinician   Order of Presentation 1, 2, 5, 6   Preparatory Phase Poor bolus control   Oral Phase, Thin Liquid Premature pharyngeal entry   Pharyngeal Phase, Thin Liquid Delayed swallow reflex;Residue in valleculae   Rosenbek's Penetration Aspiration Scale: Thin Liquid Trial Results 3 - contrast remains above the vocal cords, visible residue remains (penetration)   Diagnostic Statement Limited assessment d/t pt acceptance/participation. Premature spillage and delay. One instance of penetration after the solid trial - ?related to calling out while swallowing/increased anxiety and fear of choking with solids   VFSS Eval: Mildly Thick Liquids   Diagnostic Statement not trialed d/t limited acceptance   VFSS Evaluation: Puree Solid Texture Trial   Mode of Presentation, Puree spoon;fed by clinician   Order of Presentation 3   Preparatory Phase WFL   Oral Phase, Puree Premature pharyngeal entry   Pharyngeal Phase, Puree WFL   Rosenbek's Penetration Aspiration Scale: Puree Food Trial Results 1 - no aspiration, contrast does not enter airway   Diagnostic Statement Generally intact with no penetration, aspiration or pharyngeal residue   VFSS Eval: Soft & Bite Sized   Mode of Presentation self-fed   Order of presentation 4   Preparatory Phase Holding   Diagnostic Statement Pt reported not having any teeth to be  able to chew. Reassured pt her dentures were in place. Pt made few attempts at mastication, then yelling out that she can't swallow and expressed fear of choking. Pt eventually spit out. Swallow was not captured.   Swallowing Recommendations   Diet Consistency Recommendations soft & bite-sized (level 6);thin liquids (level 0)   Supervision Level for Intake 1:1 supervision needed   Mode of Delivery Recommendations bolus size, small;slow rate of intake   Swallowing Maneuver Recommendations effortful (hard) swallow   Recommended Feeding/Eating Techniques (Swallow Eval) maintain upright sitting position for eating;maintain upright posture during/after eating for 30 minutes;minimize distractions during oral intake   Medication Administration Recommendations, Swallowing (SLP) trial oral meds if pt is agreeable; attempt to wean off PEG   General Therapy Interventions   Planned Therapy Interventions Dysphagia Treatment   Dysphagia treatment Instruction of safe swallow strategies;Modified diet education;Oropharyngeal exercise training   Clinical Impression   Criteria for Skilled Therapeutic Interventions Met (SLP Eval) Yes, treatment indicated   SLP Diagnosis Dysphagia   Risks & Benefits of therapy have been explained risks/benefits reviewed;evaluation/treatment results reviewed;care plan/treatment goals reviewed;current/potential barriers reviewed;participants included;patient   Clinical Impression Comments Limited repeat Video Swallow Study completed. Unable to assess solids beyond a puree texture. No aspiration observed on limited trials.   SLP Discharge Planning   SLP Discharge Recommendation Transitional Care Facility;home with home care speech therapy   SLP Rationale for DC Rec Recommend ongoing SLP services.  Patient may be appropriate for discharge home consideration if she has 1:1 assist for tube feeding or modifed diet preparation.  She may progress to oral diet by discharge.   SLP Brief overview of current status   VFSS completed. IDDSI level 6 (soft and bite sized) with thin liquids. Straws ok. Encourage pt to feed herself as able    Total Evaluation Time   Total Evaluation Time (Minutes) 50   SLP Goals   Therapy Frequency (SLP Eval) 4 times/wk   SLP Predicted Duration/Target Date for Goal Attainment 09/26/22   SLP Goals Swallow   SLP: Safely tolerate diet without signs/symptoms of aspiration Soft & bite sized diet;Thin liquids;With use of swallow precautions;Independently   Psychosocial Support   Trust Relationship/Rapport care explained;choices provided;emotional support provided;empathic listening provided;questions answered;questions encouraged;reassurance provided;thoughts/feelings acknowledged

## 2022-09-15 NOTE — PROGRESS NOTES
[facility]  Hospitalist Progress Note  Sekou Harvey MD  09/15/2022    Assessment & Plan         Julisa Chaney is a 77 year old female admitted on 7/27/2022.  PMH of trigeminal neuralgia who was recently admitted to [facility] on 7/19 for severe sepsis due to COVID-19 infection and suspected bacterial pneumonia. She was treated with 5 days of remdesivir and did not require any steroids, also treated with IV vancomycin and IV meropenem.     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  She was switched to IV vancomycin, cefepime and metronidazole and transferred to [facility] for neurosurgery assessment. MRI brain w/wo contrast 7/30 showed ventriculitis with probable abscess. Neurosurgery/neurology/infectious disease/oncology consulted. Patient taken to the OR on 7/31 for left frontal ventriculostomy.   Since then has remained on broad spectrum antibiotics.  In addition she has developed C. Difficile colitis and is on treatment.  She has continued to have a significant encephalopathy which is very slowly improving.     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  * flow cytometry did not show any evidence of malignancy.  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 The left temporal horn remains slightly dilated but no samuel hydrocephalus. No acute changes.  - Remains afebrile; leukocytosis resolved ; was initially on vancomycin, cefepime, and metronidazole, then Meropenem and Vancomycin; ID signed off, completed 6 weeks treatment until 9/8/22.  - Continue scheduled Tylenol 650 mg TID. PRN ibuprofen ordered.  - Trying to avoid any narcotics with her confusion/ encephalopathy.  - PT/OT consulted, however patient does not put much effort into therapies currently and PT/OT signing off for now.  - Progress has been slow and fairly minimal. Concern about overall prognosis. Briefly discussed palliative care with patient's daughter on 9/6/22. She requested re-evaluation from psychiatry regarding depression (completed 9/8/22, see consult note). Seems to want to see how she does with the medication prior to having any discussion about palliative care.       Acute infectious encephalopathy due to above:  * mentation much improved since admission ; fluctuating at times ; will not engage in conversation most of the times.  * 8/23 discontinued prn benadryl, narcotics; minimize narcotic.  - Continue to treat underlying issues as noted above.   - Maintain normal sleep/wake cycle as able.  - Use minimal single dosing of narcotic if absolutely needed for pain, no standing order.   - had some head,   ultram 50 mg q 6 hrs prn with improved headaches     Suicidal throughs/agitation  Depression  * With improvement in her mentation, she has started becoming at times restless and agitated. On 8/27, reported having suicidal thoughts. No plans/attempts.  * initiated suicide precautions and sitter, evaluated by psychiatry service, discontinued.   - Reassess periodically for  ongoing need.  - Anti depressant not recommended currently in the setting of delirium/encephalopathy.  - HS seroquel increased 8/31.  - Psych reconsulted on 9/8/22, restarted remeron (see consult note).  - will add prn seroquel and increase remeron from 7.5 to 15 mg qhs     C. difficile colitis  Patient was noted to have copious diarrhea on 8/1 and was noted positive for C. Difficile.  * Rectal tube came off 8/28 still with some diarrhea but not severe  - Continue oral vancomycin - till 9/22.  - continue questran from 4g daily to BID, loose stools are improving  - continue probiotic  - barrier cream for skin breakdown     Acute on chronic anemia  Prior labs show baseline hemoglobin of about 9-10.   * Hb on admission was 11.7, slowly trended down, got 1 unit PRBC on 8/8/22 for Hb 6.9  * Received IV iron infusion x3 with first on 8/11/22.  - Iron panel on 7/30/22 showed that her total iron was low at 14, binding capacity was low at 28, iron saturation was 6 and B12 was high and folate was normal  * No obvious ongoing bleeding noted; Hb has now remained stable around 9 to 10 on 8/26  - Continue to monitor hgb periodically.     History of trigeminal neuralgia  - Continue PTA Trileptal , Keppra and Topamax.     History of chronic pyloric ulcer  She had EGD done in 2020 which showed gastroduodenitis with a duodenal stricture.  - Continue PPI.     Hyperglycemia, resolved  Recent HbA1c was 5.6 on 7/19/22. Was not on any medication prior to admission.  - Continue medium dose sliding scale insulin.  - Requiring no supplemental insulin and no hypoglycemia, glucose checks q8h during the day and at 0200.  - Decreased lantus to 8 units nightly 9/2.  - Blood sugars well controlled.      Intermittent Hypokalemia, Hypo/hypernatremia, Hypophosphatemia  - Replacement per protocol.     Hypernatremia: resolved.  - Continue free water flushes 200 ml every 4 hrs.     Severe dysphagia  Severe protein calorie malnutrition  - Nutrition  following for tube feeds via G tube (placed 8/16) ; getting free water flushes.  - on calorie counts with DD6 diet.     Physical deconditioning  - Will need TCU placement; declined by LTACH.  - PT/OT signed off due to lack of patient participation.  -  following for disposition.     Diet: DD6 diet;  For oral intake and gastric feedings for 6 hours post insertion Gastrostomy G/GJ tube. May feed through Jejunal or Distal PORT immediately for GJ tubes ONLY.  Adult Formula Drip Feeding: Continuous Vital 1.5; Gastrostomy; Goal Rate: 50; mL/hr; Medication - Feeding Tube Flush Frequency: At least 15-30 mL water before and after medication administration and with tube clogging; Amount to Send (Nutrition us...    -- seen by speech for swallow evaluation and started on a diet with calorie counts.  -- plan for nocturnal feedings.    DVT Prophylaxis: Pneumatic Compression Devices  Abrams Catheter: Not present  Central Lines: PRESENT  PICC Triple Lumen 07/22/22 Right Basilic Vascular access-Site Assessment: WDL  Cardiac Monitoring: None  Code Status: Full Code       Disposition Plan  - Will need TCU placement; declined by LTACH.  - PT/OT   - plan for weaning off feeding tube in the next few days  - would plan for TCU on 9/19  - attempted to call daughter Alissa for update, no answer.      Interval History   -- discussed with RN  -- more anxious and restless today  -- started diet DD6    -Data reviewed today: I reviewed all new labs and imaging over the last 24 hours. I personally reviewed no images or EKG's today.    Physical Exam    , Blood pressure 121/51, pulse 74, temperature 97.2  F (36.2  C), temperature source Axillary, resp. rate 16, weight 49 kg (108 lb), SpO2 97 %, not currently breastfeeding.  Vitals:    09/05/22 0552 09/07/22 0500 09/14/22 0616   Weight: 50.3 kg (110 lb 14.3 oz) 50.1 kg (110 lb 7.2 oz) 49 kg (108 lb)     Vital Signs with Ranges  Temp:  [97  F (36.1  C)-98.6  F (37  C)] 97.2  F (36.2  C)  Pulse:   [74-85] 74  Resp:  [16-17] 16  BP: (121-132)/(51-69) 121/51  SpO2:  [94 %-98 %] 97 %  I/O's Last 24 hours  I/O last 3 completed shifts:  In: -   Out: 1 [Emesis/NG output:1]    Constitutional:  anxious  Respiratory: Clear to auscultation bilaterally, no crackles or wheezing  Cardiovascular: Regular rate and rhythm, normal S1 and S2,   GI: Normal bowel sounds, soft, non-distended, non-tender, G tube in place.  Skin/Integumen: No rashes, no cyanosis, no edema  Other:      Medications   All medications were reviewed.      banatrol plus  1 packet Per Feeding Tube TID w/meals     cholestyramine  1 packet Oral BID     insulin aspart  1-6 Units Subcutaneous Q8H     insulin glargine  8 Units Subcutaneous At Bedtime     levETIRAcetam  1,000 mg Oral or Feeding Tube Q12H     miconazole with skin protectant   Topical BID     mirtazapine  7.5 mg Oral or Feeding Tube At Bedtime     multivitamins w/minerals  15 mL Per Feeding Tube Daily     OXcarbazepine  450 mg Oral or Feeding Tube At Bedtime     pantoprazole  40 mg Oral or Feeding Tube QAM AC     QUEtiapine  50 mg Oral At Bedtime     saccharomyces boulardii  250 mg Per Feeding Tube BID     sodium chloride (PF)  10-40 mL Intracatheter Q8H     sodium chloride (PF)  10-40 mL Intracatheter Q7 Days     topiramate  50 mg Oral or Feeding Tube At Bedtime     vancomycin  125 mg Oral or Feeding Tube 4x Daily        Data   Recent Labs   Lab 09/15/22  0825 09/14/22  2113 09/14/22  1731 09/14/22  1355 09/12/22  0842 09/12/22  0540 09/11/22  1326 09/11/22  0538   WBC  --   --   --   --   --  9.8  --   --    HGB  --   --   --   --   --  10.0*  --   --    MCV  --   --   --   --   --  82  --   --    PLT  --   --   --   --   --  437  --   --    NA  --   --   --   --   --  136  --   --    POTASSIUM  --   --   --  4.2  --  4.3  --  4.3   CHLORIDE  --   --   --   --   --  102  --   --    CO2  --   --   --   --   --  27  --   --    BUN  --   --   --   --   --  19  --   --    CR  --   --   --   --   --   0.51*  --   --    ANIONGAP  --   --   --   --   --  7  --   --    ADY  --   --   --   --   --  9.1  --   --    * 112* 115*  --    < > 126*   < >  --     < > = values in this interval not displayed.       Recent Results (from the past 24 hour(s))   XR Video Swallow with SLP or OT    Narrative    VIDEO SWALLOWING EVALUATION   9/15/2022 11:51 AM     HISTORY: Brain abscess, encephalopathy    COMPARISON: None.    FLUOROSCOPY TIME: None     Number of cine runs: 0.7 minutes    FINDINGS:    Thin: Premature spill to the vallecula. Mild penetration. Otherwise  normal.    Slightly thick: Not administered.    Mildly thick: Not administered.    Moderately thick: Not administered.    Puree: Normal.    Semisolid: Not administered.    Regular: Not administered.    This study only includes the cervical esophagus.    CHEO POLK MD         SYSTEM ID:  [id number]       Sekou Harvey MD  Text Page  (7am to 6pm)

## 2022-09-15 NOTE — PLAN OF CARE
Goal Outcome Evaluation:        Reason for Admission: L brain abscess, sepsis, encephalopathy  Cognitive/Mentation: A/Ox 2, disoriented to time and situation,anxious, irritable, forgetful   Neuros/CMS: Stable with generalized weakness. Following commands. Spontaneously moving all extremities.   VS: VSS on RA  Tele: N/A  GI: loose freq BMs, incontinent, on PO vanco for cdiff  : incontinent.  Pulmonary: LS diminished, RA  Pain: HA and abd pain, prn Tylenol given, and tramadol givenX2.   Drains/Lines:SL. PEG tube clamped (now on 8p-8a TF at 55ml   Skin: redness/rash in perineum area- skin barrier applied.  Activity: Assist x 2 with lift device.  Diet: bite size/soft diet, thin liq, flush q4hr 200 mL flushes. Nocturnal  TF  Therapies recs: TCU  Discharge: Pending  Aggression Stoplight Tool: Green  End of shift summary: Seroquel prn for agitation added and helpful.

## 2022-09-15 NOTE — PROGRESS NOTES
"Chart reviewed    9/14: Soft/Bite Sized (L6), Mildly Thick Liquids (L2) - per SLP    Continues on EN via PEG: Vital 1.5 @ 50 mL/hr     ASSESSED NUTRITION NEEDS:  Dosing Weight 48.3 kg (7/27 - admit wt)  Estimated Energy Needs: 9058-5922 kcals (35-40 Kcal/Kg)  Estimated Protein Needs: 70-95 grams protein (1.5-2 g pro/Kg)    Visited with pt this morning  Breakfast just delivered - oatmeal, Greek yogurt, thickened OJ  Pt asking for a straw - \"I can't drink that juice without spilling, I need a straw\"  Tells me that she likes strawberry ice cream      PLAN:  1) Will start calorie count to assess po intake (9/15-9/17)  2) Will send a strawberry Magic Cup with lunch and a strawberry Ensure with dinner  3) Modify TF to a 12 hr night cycle to stimulate po intake --->   Vital 1.5 @ 55 mL/hr x 12 hrs (8pm-8am) + 1 packet Banatrol TID = 1110 cals (65% needs), 45 gm pro (64% needs), 10 gm fiber, 504 mL free water    Nadeen Candelario, VICKIE, LD  Clinical Dietitian - [facility]   Pager - [phone]    "

## 2022-09-16 ENCOUNTER — APPOINTMENT (OUTPATIENT)
Dept: SPEECH THERAPY | Facility: CLINIC | Age: 77
DRG: 023 | End: 2022-09-16
Attending: INTERNAL MEDICINE
Payer: COMMERCIAL

## 2022-09-16 LAB
ANION GAP SERPL CALCULATED.3IONS-SCNC: 6 MMOL/L (ref 3–14)
BUN SERPL-MCNC: 19 MG/DL (ref 7–30)
CALCIUM SERPL-MCNC: 9.2 MG/DL (ref 8.5–10.1)
CHLORIDE BLD-SCNC: 102 MMOL/L (ref 94–109)
CO2 SERPL-SCNC: 26 MMOL/L (ref 20–32)
CREAT SERPL-MCNC: 0.49 MG/DL (ref 0.52–1.04)
ERYTHROCYTE [DISTWIDTH] IN BLOOD BY AUTOMATED COUNT: 20.4 % (ref 10–15)
GFR SERPL CREATININE-BSD FRML MDRD: >90 ML/MIN/1.73M2
GLUCOSE BLD-MCNC: 117 MG/DL (ref 70–99)
GLUCOSE BLDC GLUCOMTR-MCNC: 116 MG/DL (ref 70–99)
GLUCOSE BLDC GLUCOMTR-MCNC: 143 MG/DL (ref 70–99)
GLUCOSE BLDC GLUCOMTR-MCNC: 94 MG/DL (ref 70–99)
HCT VFR BLD AUTO: 34.8 % (ref 35–47)
HGB BLD-MCNC: 10.7 G/DL (ref 11.7–15.7)
MCH RBC QN AUTO: 25.3 PG (ref 26.5–33)
MCHC RBC AUTO-ENTMCNC: 30.7 G/DL (ref 31.5–36.5)
MCV RBC AUTO: 82 FL (ref 78–100)
PHOSPHATE SERPL-MCNC: 4.1 MG/DL (ref 2.5–4.5)
PLATELET # BLD AUTO: 546 10E3/UL (ref 150–450)
POTASSIUM BLD-SCNC: 4.1 MMOL/L (ref 3.4–5.3)
RBC # BLD AUTO: 4.23 10E6/UL (ref 3.8–5.2)
SODIUM SERPL-SCNC: 134 MMOL/L (ref 133–144)
WBC # BLD AUTO: 9 10E3/UL (ref 4–11)

## 2022-09-16 PROCEDURE — 250N000013 HC RX MED GY IP 250 OP 250 PS 637

## 2022-09-16 PROCEDURE — 92526 ORAL FUNCTION THERAPY: CPT | Mod: GN | Performed by: SPEECH-LANGUAGE PATHOLOGIST

## 2022-09-16 PROCEDURE — 36415 COLL VENOUS BLD VENIPUNCTURE: CPT | Performed by: INTERNAL MEDICINE

## 2022-09-16 PROCEDURE — 250N000013 HC RX MED GY IP 250 OP 250 PS 637: Performed by: INTERNAL MEDICINE

## 2022-09-16 PROCEDURE — 120N000001 HC R&B MED SURG/OB

## 2022-09-16 PROCEDURE — 85027 COMPLETE CBC AUTOMATED: CPT | Performed by: INTERNAL MEDICINE

## 2022-09-16 PROCEDURE — 250N000013 HC RX MED GY IP 250 OP 250 PS 637: Performed by: HOSPITALIST

## 2022-09-16 PROCEDURE — 80048 BASIC METABOLIC PNL TOTAL CA: CPT | Performed by: INTERNAL MEDICINE

## 2022-09-16 PROCEDURE — 99232 SBSQ HOSP IP/OBS MODERATE 35: CPT | Performed by: INTERNAL MEDICINE

## 2022-09-16 PROCEDURE — 84100 ASSAY OF PHOSPHORUS: CPT | Performed by: INTERNAL MEDICINE

## 2022-09-16 RX ADMIN — Medication 40 MG: at 09:46

## 2022-09-16 RX ADMIN — TRAMADOL HYDROCHLORIDE 50 MG: 50 TABLET, COATED ORAL at 02:45

## 2022-09-16 RX ADMIN — VANCOMYCIN HYDROCHLORIDE 125 MG: KIT at 21:34

## 2022-09-16 RX ADMIN — TRAMADOL HYDROCHLORIDE 50 MG: 50 TABLET, COATED ORAL at 21:21

## 2022-09-16 RX ADMIN — VANCOMYCIN HYDROCHLORIDE 125 MG: KIT at 09:46

## 2022-09-16 RX ADMIN — Medication 250 MG: at 09:41

## 2022-09-16 RX ADMIN — Medication 1 DROP: at 15:40

## 2022-09-16 RX ADMIN — VANCOMYCIN HYDROCHLORIDE 125 MG: KIT at 12:49

## 2022-09-16 RX ADMIN — Medication 1 PACKET: at 12:49

## 2022-09-16 RX ADMIN — Medication 1 PACKET: at 17:33

## 2022-09-16 RX ADMIN — LEVETIRACETAM 1000 MG: 100 SOLUTION ORAL at 09:46

## 2022-09-16 RX ADMIN — MICONAZOLE NITRATE: 20 CREAM TOPICAL at 21:40

## 2022-09-16 RX ADMIN — QUETIAPINE 12.5 MG: 25 TABLET, FILM COATED ORAL at 16:05

## 2022-09-16 RX ADMIN — CHOLESTYRAMINE 4 G: 4 POWDER, FOR SUSPENSION ORAL at 09:45

## 2022-09-16 RX ADMIN — MICONAZOLE NITRATE: 20 CREAM TOPICAL at 09:58

## 2022-09-16 RX ADMIN — Medication 1 DROP: at 19:03

## 2022-09-16 RX ADMIN — VANCOMYCIN HYDROCHLORIDE 125 MG: KIT at 17:33

## 2022-09-16 RX ADMIN — ACETAMINOPHEN 650 MG: 325 SUSPENSION ORAL at 02:46

## 2022-09-16 RX ADMIN — ACETAMINOPHEN 650 MG: 325 SUSPENSION ORAL at 17:33

## 2022-09-16 RX ADMIN — MIRTAZAPINE 7.5 MG: 7.5 TABLET, FILM COATED ORAL at 21:21

## 2022-09-16 RX ADMIN — CHOLESTYRAMINE 4 G: 4 POWDER, FOR SUSPENSION ORAL at 21:21

## 2022-09-16 RX ADMIN — QUETIAPINE 12.5 MG: 25 TABLET, FILM COATED ORAL at 09:39

## 2022-09-16 RX ADMIN — LEVETIRACETAM 1000 MG: 100 SOLUTION ORAL at 21:34

## 2022-09-16 RX ADMIN — QUETIAPINE FUMARATE 50 MG: 50 TABLET ORAL at 21:34

## 2022-09-16 RX ADMIN — TOPIRAMATE 50 MG: 50 TABLET ORAL at 21:27

## 2022-09-16 RX ADMIN — Medication 250 MG: at 21:21

## 2022-09-16 RX ADMIN — INSULIN ASPART 1 UNITS: 100 INJECTION, SOLUTION INTRAVENOUS; SUBCUTANEOUS at 22:56

## 2022-09-16 RX ADMIN — Medication 15 ML: at 09:46

## 2022-09-16 RX ADMIN — OXCARBAZEPINE 450 MG: 300 SUSPENSION ORAL at 21:34

## 2022-09-16 RX ADMIN — INSULIN GLARGINE 8 UNITS: 100 INJECTION, SOLUTION SUBCUTANEOUS at 21:22

## 2022-09-16 RX ADMIN — TRAMADOL HYDROCHLORIDE 50 MG: 50 TABLET, COATED ORAL at 12:48

## 2022-09-16 RX ADMIN — Medication 1 PACKET: at 09:46

## 2022-09-16 ASSESSMENT — ACTIVITIES OF DAILY LIVING (ADL)
ADLS_ACUITY_SCORE: 39
ADLS_ACUITY_SCORE: 43
ADLS_ACUITY_SCORE: 41
ADLS_ACUITY_SCORE: 41
ADLS_ACUITY_SCORE: 42
ADLS_ACUITY_SCORE: 42
ADLS_ACUITY_SCORE: 41
ADLS_ACUITY_SCORE: 41
ADLS_ACUITY_SCORE: 43
ADLS_ACUITY_SCORE: 41
ADLS_ACUITY_SCORE: 39
ADLS_ACUITY_SCORE: 41

## 2022-09-16 NOTE — PROGRESS NOTES
[facility]    Medicine Progress Note - Hospitalist Service    Date of Admission:  7/27/2022    Assessment & Plan        Julisa Chaney is a 77 year old female admitted on 7/27/2022.  PMH of trigeminal neuralgia who was recently admitted to [facility] on 7/19 for severe sepsis due to COVID-19 infection and suspected bacterial pneumonia. She was treated with 5 days of remdesivir and did not require any steroids, also treated with IV vancomycin and IV meropenem.     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  She was switched to IV vancomycin, cefepime and metronidazole and transferred to [facility] for neurosurgery assessment. MRI brain w/wo contrast 7/30 showed ventriculitis with probable abscess. Neurosurgery/neurology/infectious disease/oncology consulted. Patient taken to the OR on 7/31 for left frontal ventriculostomy.   Since then has remained on broad spectrum antibiotics.  In addition she has developed C. Difficile colitis and is on treatment.  She has continued to have a significant encephalopathy which is very slowly improving.     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  * flow cytometry did not show any evidence of malignancy.  * s/p Left frontal ventriculostomy on 7/31/2022, EVD removed 8/8; CSF cultures have remained negative; CSF counts decreasing 469---233 ---56 (last CSF from 8/18)  * Head CT from 8/19 with stable presumed vasogenic edema in the white matter of the anterior inferior left frontal lobe related to ventriculitis of the left  lateral ventricle; stable ventriculomegaly of the left lateral ventricle   * Has been having persistent headaches; reevaluated by neurosurgery 8/27 and recommended head CT, done on 8/29-->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 The left temporal horn remains slightly dilated but no samuel hydrocephalus. No acute changes.  - Remains afebrile; leukocytosis resolved ; was initially on vancomycin, cefepime, and metronidazole, then Meropenem and Vancomycin; ID signed off, completed 6 weeks treatment until 9/8/22.  - Continue scheduled Tylenol 650 mg TID. PRN ibuprofen ordered.  - Trying to avoid any narcotics with her confusion/ encephalopathy.  - Progress has been slow and fairly minimal. Concern about overall prognosis. Briefly discussed palliative care with patient's daughter on 9/6/22. She requested re-evaluation from psychiatry regarding depression (completed 9/8/22, see consult note). Seems to want to see how she does with the medication prior to having any discussion about palliative care.     Acute infectious encephalopathy due to above  * mentation much improved since admission ; fluctuating at times ; will not engage in conversation most of the times.  * 8/23 discontinued prn benadryl, narcotics; minimize narcotic.  - Continue to treat underlying issues as noted above  - Maintain normal sleep/wake cycle as able  - Use minimal single dosing of narcotic if absolutely needed for pain, no standing order   - PRN ultram 50 mg q 6 hrs     Suicidal throughs/agitation  Depression  * With improvement in her mentation, she has started becoming at times restless and agitated. On 8/27, reported having suicidal thoughts. No plans/attempts.  * initiated suicide precautions and sitter, evaluated by psychiatry service, discontinued.   - Reassess periodically for ongoing need.  - Anti depressant not recommended currently in the setting of delirium/encephalopathy.  - HS seroquel increased 8/31  - Psych  re-consulted on 9/8/22, restarted remeron (see consult note  - PRN Seroquel and with increased remeron from 7.5 to 15 mg qhs     C. difficile colitis  Patient was noted to have copious diarrhea on 8/1 and was noted positive for C. Difficile.  * Rectal tube came off 8/28 still with some diarrhea but not severe  - Continue oral vancomycin - till 9/22  - Continue questran from 4g daily to BID, loose stools are improving  - Continue probiotic  - Barrier cream for skin breakdown     Acute on chronic anemia  Prior labs show baseline hemoglobin of about 9-10.   * Hb on admission was 11.7, slowly trended down, got 1 unit PRBC on 8/8/22 for Hb 6.9  * Received IV iron infusion x3 with first on 8/11/22.  - Iron panel on 7/30/22 showed that her total iron was low at 14, binding capacity was low at 28, iron saturation was 6 and B12 was high and folate was normal  * No obvious ongoing bleeding noted; Hb has now remained stable around 9 to 10 on 8/26  - Continue to monitor hgb periodically     History of trigeminal neuralgia  - Continue PTA Trileptal , Keppra and Topamax     History of chronic pyloric ulcer  She had EGD done in 2020 which showed gastroduodenitis with a duodenal stricture.  - Continue PPI     Hyperglycemia, resolved  Recent HbA1c was 5.6 on 7/19/22. Was not on any medication prior to admission.  - Continue medium dose sliding scale insulin  - Requiring no supplemental insulin and no hypoglycemia, glucose checks q8h during the day and at 0200  - Decreased lantus to 8 units nightly 9/2  - Blood sugars well controlled      Intermittent Hypokalemia, Hypo/hypernatremia, Hypophosphatemia  - Replacement per protocol.     Hypernatremia: resolved.  - Continue free water flushes 200 ml every 4 hrs.     Severe dysphagia  Severe protein calorie malnutrition  - Nutrition following for tube feeds via G tube (placed 8/16) ; getting free water flushes.  - on calorie counts with DD6 diet.     Physical deconditioning  - Will need  "TCU placement; declined by LTACH  - PT/OT signed off due to lack of patient participation  -  following for disposition        Diet: Calorie Counts  Adult Formula Drip Feeding: Continuous Vital 1.5; Gastrostomy; Goal Rate: 55 mL/hr x 12 hrs (8pm-8am); mL/hr; Medication - Feeding Tube Flush Frequency: At least 15-30 mL water before and after medication administration and with tube clogging; [place]...  Snacks/Supplements Adult: Other; L: mota Magic Cup, D: strawberry Ensure; With Meals  Combination Diet Soft and Bite Sized Diet (level 6); Thin Liquids (level 0) (pt prefers straws)    DVT Prophylaxis: Pneumatic Compression Devices  Abrams Catheter: Not present  Central Lines: None  Cardiac Monitoring: None  Code Status: Full Code      Disposition Plan      Expected Discharge Date: 09/17/2022    Discharge Delays: Insurance Authorization needed  Placement - TCU  Destination: inpatient rehabilitation facility  Discharge Comments: TCU pending, they may decline. Difficult placement        The patient's care was discussed with the Bedside Nurse, Care Coordinator/ and Patient.    Diego Prieto, DO  Hospitalist Service  [facility]  Securely message with the Vocera Web Console (learn more here)  Text page via [employer] Paging/Directory         Clinically Significant Risk Factors Present on Admission                      ______________________________________________________________________    Interval History   Patient seen and examined.  No acute events over night.  Patient states she feels \"terrible\" but is unable to describe further.  States she has pain diffusely.  No fevers noted.  Unclear if diarrhea is present.  No vomiting.  No nausea     Data reviewed today: I reviewed all medications, new labs and imaging results over the last 24 hours. I personally reviewed no images or EKG's today.    Physical Exam   Vital Signs: Temp: 97.8  F (36.6  C) Temp src: Oral BP: 124/72 Pulse: 82   " Resp: 16 SpO2: 98 % O2 Device: None (Room air)    Weight: 108 lbs 0 oz  General Appearance: Resting comfortably. NAD.  Frail appearing  Respiratory: Clear to auscultation.  No respiratory distress  Cardiovascular: RRR.  No obvious murmurs  GI: Soft.  Non-distended   Skin: No obvious rashes or cyanosis   Other: Alert.  No edema     Data   Recent Labs   Lab 09/16/22  1156 09/16/22  0846 09/16/22  0736 09/14/22  1731 09/14/22  1355 09/12/22  0842 09/12/22  0540   WBC  --  9.0  --   --   --   --  9.8   HGB  --  10.7*  --   --   --   --  10.0*   MCV  --  82  --   --   --   --  82   PLT  --  546*  --   --   --   --  437   NA  --  134  --   --   --   --  136   POTASSIUM  --  4.1  --   --  4.2  --  4.3   CHLORIDE  --  102  --   --   --   --  102   CO2  --  26  --   --   --   --  27   BUN  --  19  --   --   --   --  19   CR  --  0.49*  --   --   --   --  0.51*   ANIONGAP  --  6  --   --   --   --  7   ADY  --  9.2  --   --   --   --  9.1   GLC 94 117* 116*   < >  --    < > 126*    < > = values in this interval not displayed.     No results found for this or any previous visit (from the past 24 hour(s)).

## 2022-09-16 NOTE — PROGRESS NOTES
Care Management Follow Up    Length of Stay (days): 51    Expected Discharge Date: 09/17/2022     Concerns to be Addressed:       Patient plan of care discussed at interdisciplinary rounds: Yes    Anticipated Discharge Disposition: LTACH     Anticipated Discharge Services:    Anticipated Discharge DME:      Patient/family educated on Medicare website which has current facility and service quality ratings:    Education Provided on the Discharge Plan:    Patient/Family in Agreement with the Plan: yes    Referrals Placed by CM/SW: Post Acute Facilities (Submit for insurance authorization)  Private pay costs discussed: Not applicable    Additional Information:  SW following up on referrals for TCU    [facility] - left VM  [facility] - resent referral  [facility] - resent referral  [facility] - resent referral  [facility] - resent referral  [facility] - resent referral  [facility] - resent referral  [facility] - do not have any private rooms at any of their facilities      WILEY Moran

## 2022-09-16 NOTE — PROGRESS NOTES
CALORIE COUNT      Approximate Oral Intake for: (9/15 - soft/bite sized, mildly thick liquids)      Calories: 512 cals  Protein: 22 gm pro    EN via G-tube: switched to noc cycle yesterday  Vital 1.5 @ 55 mL/hr x 12 hrs (8pm-8am) + 1 packet Banatrol TID = 1110 cals (65% needs), 45 gm pro (64% needs), 10 gm fiber, 504 mL free water    TF + po intake = 1622 cals (95% needs), 67 gm pro (96% needs)        Estimated Needs:    Dosing Weight 48.3 kg (7/27 - admit wt)  Estimated Energy Needs: 8836-7404 kcals (35-40 Kcal/Kg)  Estimated Protein Needs: 70-95 grams protein (1.5-2 g pro/Kg)      Summary:   9/15: VSS --> advance diet to soft/bites sized (L6), thin liquids (pt prefers straws)  Supplements ---> lunch: berry magic cup, dinner: strawberry  Ensure  Note that pt ate 50% of the magic cup and 0% of the thickened Ensure    Tolerating noc TF  BM x4 yesterday    Nadeen Candelario RD, LD  Clinical Dietitian - [facility]   Pager - [phone]

## 2022-09-16 NOTE — PLAN OF CARE
Reason for Admission: Cerebral abscess     Cognitive/Mentation: A&O x4 but agitated, restless, and frustrated  Neuros/CMS: Intact ex generalized weakness  VS: Stable on RA   GI: BS active, + flatus. Incontinent.  : Voiding. Incontinent.  Pulmonary: LS clear.  Pain: HA, abdomen, back. Reluctantly allowed me to apply heat pack to back. Refused ice packs or music for distraction.      Drains/Lines: 1x PIV R forearm, PEG tube  Skin: Improving blanchable redness of khushbu area. Barrier cream applied.   Activity: Assist x1-2 lift  Diet: Soft, bite-sized with thin liquids. Takes pills in PEG. Able to swallow with water if needed.       Therapies recs: TCU  Discharge: Placement     Aggression Stoplight Tool: Yellow     End of shift summary: Behavioral issues described in previous note. Much more relaxed and comfortable as of 1830.

## 2022-09-16 NOTE — PLAN OF CARE
Goal Outcome Evaluation:      Pt here with Brain mass. A&Ox4. Neuros generalized weakness, forgetful. VSS. Not on tele. Soft and bite sized diet, thin liquids, needs feeder. Takes pills crushed via tube feeding. Up with A2 lift. C/O generalized pain and headaches frequently. Pt scoring green on the Aggression Stop Light Tool. Plan to discharge to a TCU tomorrow, awaiting placement.

## 2022-09-16 NOTE — PROGRESS NOTES
Pt very agitated & frustrated. Pt does not believe anyone is helping her or giving her the right medications. Educated her on the pain medications she received and at what times. Pt does not believe she received the medications. Pt believes no one cares about her or for her. Very agitated & states she wants more pain meds, to put her to sleep, to clear her mind & think straight, & to let her die. Offered heat & ice packs to help pain. Pt threw ice pack off and stated it makes pain worse. Declined intervention for music. Was changed and repositioned to increase comfort. PRN Seroquel given to reduce agitation & anxiety.

## 2022-09-17 LAB
GLUCOSE BLDC GLUCOMTR-MCNC: 102 MG/DL (ref 70–99)
GLUCOSE BLDC GLUCOMTR-MCNC: 109 MG/DL (ref 70–99)
GLUCOSE BLDC GLUCOMTR-MCNC: 126 MG/DL (ref 70–99)
GLUCOSE BLDC GLUCOMTR-MCNC: 77 MG/DL (ref 70–99)
POTASSIUM BLD-SCNC: 4.2 MMOL/L (ref 3.4–5.3)

## 2022-09-17 PROCEDURE — 250N000013 HC RX MED GY IP 250 OP 250 PS 637: Performed by: INTERNAL MEDICINE

## 2022-09-17 PROCEDURE — 250N000013 HC RX MED GY IP 250 OP 250 PS 637: Performed by: HOSPITALIST

## 2022-09-17 PROCEDURE — 250N000013 HC RX MED GY IP 250 OP 250 PS 637

## 2022-09-17 PROCEDURE — 84132 ASSAY OF SERUM POTASSIUM: CPT | Performed by: INTERNAL MEDICINE

## 2022-09-17 PROCEDURE — 36415 COLL VENOUS BLD VENIPUNCTURE: CPT | Performed by: INTERNAL MEDICINE

## 2022-09-17 PROCEDURE — 99232 SBSQ HOSP IP/OBS MODERATE 35: CPT | Performed by: INTERNAL MEDICINE

## 2022-09-17 PROCEDURE — 120N000001 HC R&B MED SURG/OB

## 2022-09-17 RX ADMIN — Medication 250 MG: at 21:08

## 2022-09-17 RX ADMIN — TOPIRAMATE 50 MG: 50 TABLET ORAL at 21:08

## 2022-09-17 RX ADMIN — MIRTAZAPINE 7.5 MG: 7.5 TABLET, FILM COATED ORAL at 21:08

## 2022-09-17 RX ADMIN — Medication 250 MG: at 08:10

## 2022-09-17 RX ADMIN — VANCOMYCIN HYDROCHLORIDE 125 MG: KIT at 08:14

## 2022-09-17 RX ADMIN — INSULIN GLARGINE 8 UNITS: 100 INJECTION, SOLUTION SUBCUTANEOUS at 21:14

## 2022-09-17 RX ADMIN — ACETAMINOPHEN 650 MG: 325 SUSPENSION ORAL at 05:23

## 2022-09-17 RX ADMIN — VANCOMYCIN HYDROCHLORIDE 125 MG: KIT at 13:59

## 2022-09-17 RX ADMIN — MICONAZOLE NITRATE: 20 CREAM TOPICAL at 21:17

## 2022-09-17 RX ADMIN — QUETIAPINE 12.5 MG: 25 TABLET, FILM COATED ORAL at 12:08

## 2022-09-17 RX ADMIN — CHOLESTYRAMINE 4 G: 4 POWDER, FOR SUSPENSION ORAL at 08:18

## 2022-09-17 RX ADMIN — LEVETIRACETAM 1000 MG: 100 SOLUTION ORAL at 08:14

## 2022-09-17 RX ADMIN — CHOLESTYRAMINE 4 G: 4 POWDER, FOR SUSPENSION ORAL at 21:07

## 2022-09-17 RX ADMIN — TRAMADOL HYDROCHLORIDE 50 MG: 50 TABLET, COATED ORAL at 08:10

## 2022-09-17 RX ADMIN — QUETIAPINE FUMARATE 50 MG: 50 TABLET ORAL at 21:08

## 2022-09-17 RX ADMIN — LEVETIRACETAM 1000 MG: 100 SOLUTION ORAL at 21:09

## 2022-09-17 RX ADMIN — Medication 1 PACKET: at 08:20

## 2022-09-17 RX ADMIN — VANCOMYCIN HYDROCHLORIDE 125 MG: KIT at 17:57

## 2022-09-17 RX ADMIN — TRAMADOL HYDROCHLORIDE 50 MG: 50 TABLET, COATED ORAL at 14:10

## 2022-09-17 RX ADMIN — OXCARBAZEPINE 450 MG: 300 SUSPENSION ORAL at 21:09

## 2022-09-17 RX ADMIN — Medication 1 PACKET: at 12:08

## 2022-09-17 RX ADMIN — ACETAMINOPHEN 650 MG: 325 SUSPENSION ORAL at 12:07

## 2022-09-17 RX ADMIN — Medication 1 PACKET: at 17:57

## 2022-09-17 RX ADMIN — ACETAMINOPHEN 650 MG: 325 SUSPENSION ORAL at 16:10

## 2022-09-17 RX ADMIN — VANCOMYCIN HYDROCHLORIDE 125 MG: KIT at 21:09

## 2022-09-17 RX ADMIN — ANORECTAL OINTMENT: 15.7; .44; 24; 20.6 OINTMENT TOPICAL at 14:09

## 2022-09-17 RX ADMIN — Medication 40 MG: at 08:14

## 2022-09-17 RX ADMIN — Medication 3 MG: at 21:08

## 2022-09-17 RX ADMIN — Medication 1 DROP: at 08:14

## 2022-09-17 RX ADMIN — Medication 15 ML: at 08:14

## 2022-09-17 RX ADMIN — MICONAZOLE NITRATE: 20 CREAM TOPICAL at 08:19

## 2022-09-17 ASSESSMENT — ACTIVITIES OF DAILY LIVING (ADL)
ADLS_ACUITY_SCORE: 41
ADLS_ACUITY_SCORE: 45
ADLS_ACUITY_SCORE: 41

## 2022-09-17 NOTE — PROGRESS NOTES
[facility]    Medicine Progress Note - Hospitalist Service    Date of Admission:  7/27/2022    Assessment & Plan         Julisa Chaney is a 77 year old female admitted on 7/27/2022.  PMH of trigeminal neuralgia who was recently admitted to [facility] on 7/19 for severe sepsis due to COVID-19 infection and suspected bacterial pneumonia. She was treated with 5 days of remdesivir and did not require any steroids, also treated with IV vancomycin and IV meropenem.     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  She was switched to IV vancomycin, cefepime and metronidazole and transferred to [facility] for neurosurgery assessment. MRI brain w/wo contrast 7/30 showed ventriculitis with probable abscess. Neurosurgery/neurology/infectious disease/oncology consulted. Patient taken to the OR on 7/31 for left frontal ventriculostomy.   Since then has remained on broad spectrum antibiotics.  In addition she has developed C. Difficile colitis and is on treatment.  She has continued to have a significant encephalopathy which is very slowly improving.     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  * flow cytometry did not show any evidence of malignancy.  * s/p Left frontal ventriculostomy on 7/31/2022, EVD removed 8/8; CSF cultures have remained negative; CSF counts decreasing 469---233 ---56 (last CSF from 8/18)  * Head CT from 8/19 with stable presumed vasogenic edema in the white matter of the anterior inferior left frontal lobe related to ventriculitis of the left  lateral ventricle; stable ventriculomegaly of the left lateral ventricle   * Has been having persistent headaches; reevaluated by neurosurgery 8/27 and recommended head CT, done on 8/29-->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 The left temporal horn remains slightly dilated but no samuel hydrocephalus. No acute changes.  - Remains afebrile; leukocytosis resolved ; was initially on vancomycin, cefepime, and metronidazole, then Meropenem and Vancomycin; ID signed off, completed 6 weeks treatment until 9/8/22.  - Continue scheduled Tylenol 650 mg TID. PRN ibuprofen ordered.  - Trying to avoid any narcotics with her confusion/ encephalopathy.  - Progress has been slow and fairly minimal. Concern about overall prognosis. Briefly discussed palliative care with patient's daughter on 9/6/22. She requested re-evaluation from psychiatry regarding depression (completed 9/8/22, see consult note). Seems to want to see how she does with the medication prior to having any discussion about palliative care.     Acute infectious encephalopathy due to above  * mentation much improved since admission ; fluctuating at times ; will not engage in conversation most of the times.  * 8/23 discontinued prn benadryl, narcotics; minimize narcotic.  - Continue to treat underlying issues as noted above  - Maintain normal sleep/wake cycle as able  - Use minimal single dosing of narcotic if absolutely needed for pain, no standing order   - PRN ultram 50 mg q 6 hrs     Suicidal throughs/agitation  Depression  * With improvement in her mentation, she has started becoming at times restless and agitated. On 8/27, reported having suicidal thoughts. No plans/attempts.  * initiated suicide precautions and sitter, evaluated by psychiatry service, discontinued.   - Reassess periodically for ongoing need.  - Anti depressant not recommended currently in the setting of delirium/encephalopathy.  - HS seroquel increased 8/31  - Psych  re-consulted on 9/8/22, restarted remeron (see consult note  - PRN Seroquel and with increased remeron from 7.5 to 15 mg qhs     C. difficile colitis  Patient was noted to have copious diarrhea on 8/1 and was noted positive for C. Difficile.  * Rectal tube came off 8/28 still with some diarrhea but not severe  - Continue oral vancomycin - till 9/22  - Continue questran from 4g daily to BID, loose stools are improving  - Continue probiotic  - Barrier cream for skin breakdown     Acute on chronic anemia  Prior labs show baseline hemoglobin of about 9-10.   * Hb on admission was 11.7, slowly trended down, got 1 unit PRBC on 8/8/22 for Hb 6.9  * Received IV iron infusion x3 with first on 8/11/22.  - Iron panel on 7/30/22 showed that her total iron was low at 14, binding capacity was low at 28, iron saturation was 6 and B12 was high and folate was normal  * No obvious ongoing bleeding noted; Hb has now remained stable around 9 to 10 on 8/26  - Continue to monitor hgb periodically     History of trigeminal neuralgia  - Continue PTA Trileptal , Keppra and Topamax     History of chronic pyloric ulcer  She had EGD done in 2020 which showed gastroduodenitis with a duodenal stricture.  - Continue PPI     Hyperglycemia, resolved  Recent HbA1c was 5.6 on 7/19/22. Was not on any medication prior to admission.  - Continue medium dose sliding scale insulin  - Requiring no supplemental insulin and no hypoglycemia, glucose checks q8h during the day and at 0200  - Decreased lantus to 8 units nightly 9/2  - Blood sugars well controlled      Intermittent Hypokalemia, Hypo/hypernatremia, Hypophosphatemia  - Replacement per protocol.     Hypernatremia: resolved.  - Continue free water flushes 200 ml every 4 hrs.     Severe dysphagia  Severe protein calorie malnutrition  - Nutrition following for tube feeds via G tube (placed 8/16) ; getting free water flushes.  - On calorie counts with DD6 diet.     Physical deconditioning  - Will need  TCU placement; declined by LTACH  - PT/OT signed off due to lack of patient participation  -  following for disposition          Diet: Calorie Counts  Adult Formula Drip Feeding: Continuous Vital 1.5; Gastrostomy; Goal Rate: 55 mL/hr x 12 hrs (8pm-8am); mL/hr; Medication - Feeding Tube Flush Frequency: At least 15-30 mL water before and after medication administration and with tube clogging; [place]...  Snacks/Supplements Adult: Other; L: mota Magic Cup, D: strawberry Ensure; With Meals  Combination Diet Soft and Bite Sized Diet (level 6); Thin Liquids (level 0) (pt prefers straws)    DVT Prophylaxis: Pneumatic Compression Devices  Abrams Catheter: Not present  Central Lines: None  Cardiac Monitoring: None  Code Status: Full Code      Disposition Plan     Expected Discharge Date: 09/17/2022    Discharge Delays: Insurance Authorization needed  Placement - TCU  Destination: inpatient rehabilitation facility  Discharge Comments: TCU pending, they may decline. Difficult placement        The patient's care was discussed with the Bedside Nurse, Care Coordinator/ and Patient.    Diego Prieto, DO  Hospitalist Service  [facility]  Securely message with the Vocera Web Console (learn more here)  Text page via [employer] Paging/Directory         Clinically Significant Risk Factors Present on Admission                      ______________________________________________________________________    Interval History   Patient seen and examined.  No acute events over night.  Patient continues to complain of diffuse pain.  No fevers noted.  No hypoxia.  Tolerating diet without N/V.    Data reviewed today: I reviewed all medications, new labs and imaging results over the last 24 hours. I personally reviewed no images or EKG's today.    Physical Exam   Vital Signs: Temp: 97.7  F (36.5  C) Temp src: Oral BP: 107/55 Pulse: 77   Resp: 18 SpO2: 98 % O2 Device: None (Room air)    Weight: 108 lbs 0  oz  General Appearance: Resting comfortably.  NAD   Respiratory: Clear to auscultation.  No respiratory distress  Cardiovascular: RRR.  No obvious murmurs  GI: Soft.  Non-distended  Skin: No obvious rashes or cyanosis to exposed skin  Other: Alert.  No edema      Data   Recent Labs   Lab 09/17/22  1235 09/17/22  0956 09/17/22  0813 09/16/22  2220 09/16/22  1156 09/16/22  0846 09/14/22  1731 09/14/22  1355 09/12/22  0842 09/12/22  0540   WBC  --   --   --   --   --  9.0  --   --   --  9.8   HGB  --   --   --   --   --  10.7*  --   --   --  10.0*   MCV  --   --   --   --   --  82  --   --   --  82   PLT  --   --   --   --   --  546*  --   --   --  437   NA  --   --   --   --   --  134  --   --   --  136   POTASSIUM  --  4.2  --   --   --  4.1  --  4.2  --  4.3   CHLORIDE  --   --   --   --   --  102  --   --   --  102   CO2  --   --   --   --   --  26  --   --   --  27   BUN  --   --   --   --   --  19  --   --   --  19   CR  --   --   --   --   --  0.49*  --   --   --  0.51*   ANIONGAP  --   --   --   --   --  6  --   --   --  7   ADY  --   --   --   --   --  9.2  --   --   --  9.1   *  --  126* 143*   < > 117*   < >  --    < > 126*    < > = values in this interval not displayed.     No results found for this or any previous visit (from the past 24 hour(s)).

## 2022-09-17 NOTE — PLAN OF CARE
Goal Outcome Evaluation:    Plan of Care Reviewed with: Patient    Overall Patient Progress: No change     Pt here with Brain abscess. A&O x4,  Neuro's are stable, slow speech. VSS. Soft bite sized diet, with thin liquids. PEG tube in place, TF started 8pm as ordered, up until 8AM. Running at 50ml/hr with Q4 200 ml free water flush. Incontinent of B&B. Up with A2/lift. PRN Tramadol and Tylenol given for pain. Pt scoring yellow on the Aggression Stop Light  Tool. Discharge pending TCU.

## 2022-09-17 NOTE — PROGRESS NOTES
CALORIE COUNT      Approximate Oral Intake for: Friday 9/16     Calories: 242 kcal  Protein: 7 grams   Diet: Soft/bite sized, thin liquids       Intake from TF/PN:       EN via G-tube:   Vital 1.5 @ 55 mL/hr x 12 hrs (8pm-8am) + 1 packet Banatrol TID = 1110 cals (65% needs), 45 gm pro (64% needs), 10 gm fiber, 504 mL free water      Estimated Needs:    Calories: 5030-5662 kcals (35-40 Kcal/Kg)  Protein: 70-95 grams protein (1.5-2 g pro/Kg)  Dosing Weight 48.3 kg (7/27 - admit wt)        Summary:   PO alone met 14% estimated energy needs and 10% estimated protein needs  PO + TF together met 1352 kcal (80% estimated needs) and 52 g protein (74% estimated needs)  Pt disliked strawberry protein drink last night so it was discontinued  Calorie count information will be obtained through today with 3-day summary to be documented tomorrow     Christiane Horner RD, LD  Clinical Dietitian   Weekend pager [phone]

## 2022-09-17 NOTE — PLAN OF CARE
Reason for Admission: Cerebral abscess     Cognitive/Mentation: A&O x3-4  Neuros/CMS: Intact ex generalized weakness   VS: Stable on RA   GI: BS active, + flatus. Incontinent.  : Voiding. Incontinent.  Pulmonary: LS clear.  Pain: HA, abdomen, back. Tramadol given Q6 and Tylenol Q4.     Drains/Lines: 1x PIV R  Skin: Intact ex blanchable redness on perinium. Rash on R hip.   Activity: Assist x1-2 lift  Diet: Soft, bite-sized with thin liquids. Takes pills whole or in PEG.      Therapies recs: TCU  Discharge: Pending Placement     Aggression Stoplight Tool: Yellow     End of shift summary: Increasing amount of call lights. Constantly requesting pain medications. Lavender oil placed beside patient on gauze to calm mood. Between oils and being consistent with providing medications, patient is relatively calm.

## 2022-09-18 LAB
GLUCOSE BLDC GLUCOMTR-MCNC: 159 MG/DL (ref 70–99)
GLUCOSE BLDC GLUCOMTR-MCNC: 89 MG/DL (ref 70–99)
GLUCOSE BLDC GLUCOMTR-MCNC: 93 MG/DL (ref 70–99)
GLUCOSE BLDC GLUCOMTR-MCNC: 94 MG/DL (ref 70–99)
PHOSPHATE SERPL-MCNC: 4.2 MG/DL (ref 2.5–4.5)
POTASSIUM BLD-SCNC: 4 MMOL/L (ref 3.4–5.3)

## 2022-09-18 PROCEDURE — 250N000012 HC RX MED GY IP 250 OP 636 PS 637: Performed by: HOSPITALIST

## 2022-09-18 PROCEDURE — 250N000013 HC RX MED GY IP 250 OP 250 PS 637: Performed by: INTERNAL MEDICINE

## 2022-09-18 PROCEDURE — 250N000013 HC RX MED GY IP 250 OP 250 PS 637

## 2022-09-18 PROCEDURE — 120N000001 HC R&B MED SURG/OB

## 2022-09-18 PROCEDURE — 250N000013 HC RX MED GY IP 250 OP 250 PS 637: Performed by: HOSPITALIST

## 2022-09-18 PROCEDURE — 99232 SBSQ HOSP IP/OBS MODERATE 35: CPT | Performed by: INTERNAL MEDICINE

## 2022-09-18 PROCEDURE — 84132 ASSAY OF SERUM POTASSIUM: CPT | Performed by: INTERNAL MEDICINE

## 2022-09-18 PROCEDURE — 36415 COLL VENOUS BLD VENIPUNCTURE: CPT | Performed by: INTERNAL MEDICINE

## 2022-09-18 PROCEDURE — 84100 ASSAY OF PHOSPHORUS: CPT | Performed by: INTERNAL MEDICINE

## 2022-09-18 RX ORDER — ACETAMINOPHEN 325 MG/1
650 TABLET ORAL EVERY 4 HOURS PRN
Status: DISCONTINUED | OUTPATIENT
Start: 2022-09-18 | End: 2022-10-04 | Stop reason: HOSPADM

## 2022-09-18 RX ADMIN — LEVETIRACETAM 1000 MG: 100 SOLUTION ORAL at 08:23

## 2022-09-18 RX ADMIN — TOPIRAMATE 50 MG: 50 TABLET ORAL at 21:15

## 2022-09-18 RX ADMIN — VANCOMYCIN HYDROCHLORIDE 125 MG: KIT at 12:32

## 2022-09-18 RX ADMIN — VANCOMYCIN HYDROCHLORIDE 125 MG: KIT at 18:05

## 2022-09-18 RX ADMIN — OXCARBAZEPINE 450 MG: 300 SUSPENSION ORAL at 21:14

## 2022-09-18 RX ADMIN — INSULIN ASPART 1 UNITS: 100 INJECTION, SOLUTION INTRAVENOUS; SUBCUTANEOUS at 09:40

## 2022-09-18 RX ADMIN — VANCOMYCIN HYDROCHLORIDE 125 MG: KIT at 08:23

## 2022-09-18 RX ADMIN — Medication 40 MG: at 08:23

## 2022-09-18 RX ADMIN — Medication 15 ML: at 08:23

## 2022-09-18 RX ADMIN — TRAMADOL HYDROCHLORIDE 50 MG: 50 TABLET, COATED ORAL at 16:53

## 2022-09-18 RX ADMIN — TRAMADOL HYDROCHLORIDE 50 MG: 50 TABLET, COATED ORAL at 23:58

## 2022-09-18 RX ADMIN — MICONAZOLE NITRATE: 20 CREAM TOPICAL at 08:56

## 2022-09-18 RX ADMIN — VANCOMYCIN HYDROCHLORIDE 125 MG: KIT at 21:14

## 2022-09-18 RX ADMIN — LEVETIRACETAM 1000 MG: 100 SOLUTION ORAL at 21:14

## 2022-09-18 RX ADMIN — Medication 250 MG: at 20:19

## 2022-09-18 RX ADMIN — QUETIAPINE FUMARATE 50 MG: 50 TABLET ORAL at 21:14

## 2022-09-18 RX ADMIN — Medication 250 MG: at 08:21

## 2022-09-18 RX ADMIN — ACETAMINOPHEN 650 MG: 325 TABLET, FILM COATED ORAL at 21:14

## 2022-09-18 RX ADMIN — Medication 1 PACKET: at 09:44

## 2022-09-18 RX ADMIN — ACETAMINOPHEN 650 MG: 325 SUSPENSION ORAL at 08:54

## 2022-09-18 RX ADMIN — CHOLESTYRAMINE 4 G: 4 POWDER, FOR SUSPENSION ORAL at 20:19

## 2022-09-18 RX ADMIN — Medication 1 PACKET: at 18:05

## 2022-09-18 RX ADMIN — MIRTAZAPINE 7.5 MG: 7.5 TABLET, FILM COATED ORAL at 21:14

## 2022-09-18 RX ADMIN — ACETAMINOPHEN 650 MG: 325 SUSPENSION ORAL at 13:35

## 2022-09-18 RX ADMIN — CHOLESTYRAMINE 4 G: 4 POWDER, FOR SUSPENSION ORAL at 08:55

## 2022-09-18 RX ADMIN — Medication 3 MG: at 21:13

## 2022-09-18 RX ADMIN — INSULIN GLARGINE 8 UNITS: 100 INJECTION, SOLUTION SUBCUTANEOUS at 21:24

## 2022-09-18 RX ADMIN — MICONAZOLE NITRATE: 20 CREAM TOPICAL at 21:16

## 2022-09-18 RX ADMIN — QUETIAPINE 12.5 MG: 25 TABLET, FILM COATED ORAL at 08:54

## 2022-09-18 RX ADMIN — Medication 1 PACKET: at 12:31

## 2022-09-18 ASSESSMENT — ACTIVITIES OF DAILY LIVING (ADL)
ADLS_ACUITY_SCORE: 41
ADLS_ACUITY_SCORE: 45
ADLS_ACUITY_SCORE: 41
ADLS_ACUITY_SCORE: 45
ADLS_ACUITY_SCORE: 41

## 2022-09-18 NOTE — PLAN OF CARE
Pt here with cerebral abscess. Alert and oriented. Neuros intact, speech slightly slurred, UE 5/5, LE 4/5, generalized weakness. VSS. IDDSI level soft bite sized diet with thin liquids. Takes pills whole or via G tube. Up with lift, declines getting out of bed to chair. Incontinent of bowel and bladder. Reporting pain and anxiety, prn Tylenol and Seroquel given, effective. Pt scoring yellow on the Aggression Stop Light Tool. Plan discharge to TCU pending placement.

## 2022-09-18 NOTE — PROGRESS NOTES
[facility]    Medicine Progress Note - Hospitalist Service    Date of Admission:  7/27/2022    Assessment & Plan        Julisa Chaney is a 77 year old female admitted on 7/27/2022.  PMH of trigeminal neuralgia who was recently admitted to [facility] on 7/19 for severe sepsis due to COVID-19 infection and suspected bacterial pneumonia. She was treated with 5 days of remdesivir and did not require any steroids, also treated with IV vancomycin and IV meropenem.     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  She was switched to IV vancomycin, cefepime and metronidazole and transferred to [facility] for neurosurgery assessment. MRI brain w/wo contrast 7/30 showed ventriculitis with probable abscess. Neurosurgery/neurology/infectious disease/oncology consulted. Patient taken to the OR on 7/31 for left frontal ventriculostomy.   Since then has remained on broad spectrum antibiotics.  In addition she has developed C. Difficile colitis and is on treatment.  She has continued to have a significant encephalopathy which is very slowly improving.     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  * flow cytometry did not show any evidence of malignancy.  * s/p Left frontal ventriculostomy on 7/31/2022, EVD removed 8/8; CSF cultures have remained negative; CSF counts decreasing 469---233 ---56 (last CSF from 8/18)  * Head CT from 8/19 with stable presumed vasogenic edema in the white matter of the anterior inferior left frontal lobe related to ventriculitis of the left  lateral ventricle; stable ventriculomegaly of the left lateral ventricle   * Has been having persistent headaches; reevaluated by neurosurgery 8/27 and recommended head CT, done on 8/29-->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 The left temporal horn remains slightly dilated but no samuel hydrocephalus. No acute changes.  - Remains afebrile; leukocytosis resolved ; was initially on vancomycin, cefepime, and metronidazole, then Meropenem and Vancomycin; ID signed off, completed 6 weeks treatment until 9/8/22.  - Continue scheduled Tylenol 650 mg TID. PRN ibuprofen ordered.  - Trying to avoid any narcotics with her confusion/ encephalopathy.  - Progress has been slow and fairly minimal. Concern about overall prognosis. Briefly discussed palliative care with patient's daughter on 9/6/22. She requested re-evaluation from psychiatry regarding depression (completed 9/8/22, see consult note). Seems to want to see how she does with the medication prior to having any discussion about palliative care.     Acute infectious encephalopathy due to above  * mentation much improved since admission ; fluctuating at times ; will not engage in conversation most of the times.  * 8/23 discontinued prn benadryl, narcotics; minimize narcotic.  - Continue to treat underlying issues as noted above  - Maintain normal sleep/wake cycle as able  - Use minimal single dosing of narcotic if absolutely needed for pain, no standing order   - PRN ultram 50 mg q 6 hrs     Suicidal throughs/agitation  Depression  YUE   * With improvement in her mentation, she has started becoming at times restless and agitated. On 8/27, reported having suicidal thoughts. No plans/attempts.  * initiated suicide precautions and sitter, evaluated by psychiatry service, discontinued.   - Reassess periodically for ongoing need.  - Anti depressant not recommended currently in the setting of delirium/encephalopathy.  - HS seroquel increased 8/31  - Psych  re-consulted on 9/8/22, restarted remeron (see consult note  - PRN Seroquel and with increased remeron from 7.5 to 15 mg at bedtime  - Reconsulted psychiatry as patient continues to have issues with anxiety     C. difficile colitis  Patient was noted to have copious diarrhea on 8/1 and was noted positive for C. Difficile.  * Rectal tube came off 8/28 still with some diarrhea but not severe  - Continue oral vancomycin - till 9/22  - Continue questran from 4g daily to BID, loose stools are improving  - Continue probiotic  - Barrier cream for skin breakdown     Acute on chronic anemia  Prior labs show baseline hemoglobin of about 9-10.   * Hb on admission was 11.7, slowly trended down, got 1 unit PRBC on 8/8/22 for Hb 6.9  * Received IV iron infusion x3 with first on 8/11/22.  - Iron panel on 7/30/22 showed that her total iron was low at 14, binding capacity was low at 28, iron saturation was 6 and B12 was high and folate was normal  * No obvious ongoing bleeding noted; Hb has now remained stable around 9 to 10 on 8/26  - Continue to monitor hgb periodically     History of trigeminal neuralgia  - Continue PTA Trileptal , Keppra and Topamax     History of chronic pyloric ulcer  She had EGD done in 2020 which showed gastroduodenitis with a duodenal stricture.  - Continue PPI     Hyperglycemia, resolved  Recent HbA1c was 5.6 on 7/19/22. Was not on any medication prior to admission.  - Continue medium dose sliding scale insulin  - Requiring no supplemental insulin and no hypoglycemia, glucose checks q8h during the day and at 0200  - Decreased lantus to 8 units nightly 9/2  - Blood sugars well controlled      Intermittent Hypokalemia, Hypo/hypernatremia, Hypophosphatemia  - Replacement per protocol.     Hypernatremia: resolved.  - Continue free water flushes 200 ml every 4 hrs.     Severe dysphagia  Severe protein calorie malnutrition  - Nutrition following for tube feeds via G tube (placed 8/16) ; getting free water  flushes.  - On calorie counts with DD6 diet.     Physical deconditioning  - Will need TCU placement; declined by LTACH  - PT/OT signed off due to lack of patient participation  - SW following for disposition         Diet: Combination Diet Soft and Bite Sized Diet (level 6); Thin Liquids (level 0) (pt prefers straws)  Snacks/Supplements Adult: Other; L: mota Magic Cup, PRN sup w/ dinner; With Meals  Adult Formula Drip Feeding: Continuous Vital 1.5; Gastrostomy; Goal Rate: 60 mL/hr x 12 hrs (8pm-8am); mL/hr; Medication - Feeding Tube Flush Frequency: At least 15-30 mL water before and after medication administration and with tube clogging; [place]...    DVT Prophylaxis: Pneumatic Compression Devices  Abrams Catheter: Not present  Central Lines: None  Cardiac Monitoring: None  Code Status: Full Code      Disposition Plan      Expected Discharge Date: 09/20/2022    Discharge Delays: Insurance Authorization needed  Placement - TCU  Destination: inpatient rehabilitation facility  Discharge Comments: TCU pending, they may decline. Difficult placement        The patient's care was discussed with the Bedside Nurse, Care Coordinator/ and Patient.    Diego Prieto, DO  Hospitalist Service  [facility]  Securely message with the [email or web address] Web Console (learn more here)  Text page via Service Seeking Paging/Directory         Clinically Significant Risk Factors Present on Admission                      ______________________________________________________________________    Interval History   Patient seen and examined.  No acute events over night.  On my examination she was sleeping but arousable though will fall back asleep.  Per nursing after waking having issues with increasing anxiety.  No fevers noted.  No hypoxia.  Tolerating diet but still not maintaining good caloric intake    Data reviewed today: I reviewed all medications, new labs and imaging results over the last 24 hours. I personally  reviewed no images or EKG's today.    Physical Exam   Vital Signs: Temp: 97.2  F (36.2  C) Temp src: Axillary BP: 110/49 Pulse: 68   Resp: 16 SpO2: 98 % O2 Device: None (Room air)    Weight: 108 lbs 0 oz    General Appearance:  Resting comfortably.  NAD.  Sleeping but arousable though falls back asleep  Respiratory: No respiratory distress.  Lungs sound clear  Cardiovascular: RRR.  No obvious murmurs  GI: Soft.  Non-distended  Skin: No obvious rashes or cyanosis to exposed skin  Other:  Alert.  No significant edema    Data   Recent Labs   Lab 09/18/22  1232 09/18/22  0840 09/18/22  0828 09/17/22  2325 09/17/22  1235 09/17/22  0956 09/16/22  1156 09/16/22  0846 09/12/22  0842 09/12/22  0540   WBC  --   --   --   --   --   --   --  9.0  --  9.8   HGB  --   --   --   --   --   --   --  10.7*  --  10.0*   MCV  --   --   --   --   --   --   --  82  --  82   PLT  --   --   --   --   --   --   --  546*  --  437   NA  --   --   --   --   --   --   --  134  --  136   POTASSIUM  --   --  4.0  --   --  4.2  --  4.1   < > 4.3   CHLORIDE  --   --   --   --   --   --   --  102  --  102   CO2  --   --   --   --   --   --   --  26  --  27   BUN  --   --   --   --   --   --   --  19  --  19   CR  --   --   --   --   --   --   --  0.49*  --  0.51*   ANIONGAP  --   --   --   --   --   --   --  6  --  7   ADY  --   --   --   --   --   --   --  9.2  --  9.1   GLC 89 159*  --  109*   < >  --    < > 117*   < > 126*    < > = values in this interval not displayed.     No results found for this or any previous visit (from the past 24 hour(s)).

## 2022-09-18 NOTE — PLAN OF CARE
Goal Outcome Evaluation:     Plan of Care Reviewed with: Patient     Overall Patient Progress: No change      Pt here with Brain abscess. A&O x4,  Neuro's are stable, slow speech. VSS. Soft bite sized diet, with thin liquids. PEG tube in place, TF started 8pm as ordered, up until 8AM. Running at 50ml/hr with Q4 200 ml free water flush. Incontinent of B&B. Up with A2/lift. PRN Melatonin given. Pt scoring yellow on the Aggression Stop Light  Tool. Discharge pending TCU.

## 2022-09-18 NOTE — PROGRESS NOTES
CLINICAL NUTRITION SERVICES - REASSESSMENT NOTE AND CALORIE COUNT SUMMARY       Recommendations Ordered by Registered Dietitian (RD):   Calorie count completed - patient taking <25% estimated nutrient needs orally  PO + TF combined intake ranging from 83-90% of estimated nutrient needs    Slight increase in TF today to better meet nutrient needs while oral intake remains poor  Vital 1.5 at 60 mL/hr x 12 hrs overnight (+ Banatrol TID) will provide 1200 kcal, 50 g protein, 165 g CHO, 550 mL free water and 10 g fiber    Malnutrition:   % Weight Loss: > 10% in 6 months (severe malnutrition) - per 8/4 RD note  % Intake:  Does not meet criteria - pt meeting needs from EN + PO  Subcutaneous Fat Loss:  Orbital region moderate depletion - per 8/4 RD note  Muscle Loss:  Temporal region moderate depletion, Clavicle bone region moderate depletion, Acromion bone region moderate-severe depletion and Dorsal hand region severe depletion - per 8/4 RD note  Fluid Retention:  None noted     Malnutrition Diagnosis: Severe malnutrition  In Context of:  Acute on Chronic illness or disease       EVALUATION OF PROGRESS TOWARD GOALS   Diet: Soft and bite sized; thin liquids   Berry Magic Cup w/ lunch  Ensure w/ dinner - stopped yesterday as pt no longer likes     Nutrition Support:  TF cycled overnight starting 9/15 ~  Type of Feeding Tube: G-tube   Enteral Frequency:  Cyclic   Enteral Regimen: Vital 1.5 @ 55 mL/hr x 12 hrs (8pm-8am) + 1 packet Banatrol TID  Total Enteral Provisions: 1110 cals (65% needs), 45 gm pro (64% needs), 10 gm fiber, 504 mL free water  Free Water Flush: 200 mL q 4 hrs   Liquids MVI/M    Intake/Tolerance:  No improvement of PO intake over the past 3 days       CALORIE COUNT FINAL DAY:  Estimated oral intake for Saturday 9/17   Calories: 412 kcal  Protein: 11 grams   *of note, no meal tickets were saved or documented on from yesterday. Estimated intake based alone on meal ordering system and %meal consumption from  flowsheets    3-DAY CALORIE COUNT SUMMARY:  9/15: 512 kcal, 22 grams protein  9/16: 242 kcal, 7 grams protein  9/17: 412 kcal, 11 grams protein     Over the past 3 days, patient has consumed an average of 389 kcal/day (8 kcal/kg and 23% estimated needs) and 13 grams protein/day (0.3/kg and 18% estimated needs)    PO + overnight TF meets on average 1500 kcal/day (31 kcal/kg and 88% estimated needs) and 58 g protein/day (1.2 g/kg and 83% estimated needs)      Labs reviewed: K 4, Phos 4.2  Recent Labs   Lab 09/18/22  0840 09/17/22  2325 09/17/22  1506 09/17/22  1235 09/17/22  0813 09/16/22  2220   * 109* 77 102* 126* 143*     Medications reviewed: MSSI + 8 units lantus, remeron (increased 9/15)  Stooling: ?firmer   - 9/14: BM x7  - 9/15: BM x4  - 9/16: BM x3  - 9/17: BM x3  Wt: 108 lbs 0 oz  Vitals:    08/31/22 0500 09/04/22 0618 09/05/22 0552 09/07/22 0500   Weight: 48 kg (105 lb 13.1 oz) 50.6 kg (111 lb 8.8 oz) 50.3 kg (110 lb 14.3 oz) 50.1 kg (110 lb 7.2 oz)    09/14/22 0616   Weight: 49 kg (108 lb)         ASSESSED NUTRITION NEEDS:  Dosing Weight 48.3 kg (7/27 - admit wt)  Estimated Energy Needs: 2468-8596 kcals (35-40 Kcal/Kg)  Justification: repletion and underweight  Estimated Protein Needs: 70-95 grams protein (1.5-2 g pro/Kg)  Justification: repletion       NEW FINDINGS:   TCU placement pending, appears to be declined by LTACH  SLP 91/5 - VFSS completed. IDDSI level 6 (soft and bite sized) with thin liquids. Straws ok. Encourage pt to feed herself as able    Previous Goals:   EN to meet % est needs  Evaluation: Not met  Pt to have no more than 3 BM per day  Evaluation: Met    Previous Nutrition Diagnosis:   No nutrition diagnosis identified at this time   Evaluation: Completed      CURRENT NUTRITION DIAGNOSIS  Inadequate oral intake related to decreased appetite, suspected behavior impact and dysphagia and as evidenced by consuming <25% estimated needs orally with continued need for  TF    INTERVENTIONS  Recommendations / Nutrition Prescription  Calorie count completed - patient taking <25% estimated nutrient needs orally  PO + TF combined intake ranging from 83-90% of estimated nutrient needs    Slight increase in TF today to better meet nutrient needs while oral intake remains poor  Vital 1.5 at 60 mL/hr x 12 hrs overnight (+ Banatrol TID) will provide 1200 kcal, 50 g protein, 165 g CHO, 550 mL free water and 10 g fiber     Implementation  EN Composition: as above    Goals  Patient will consume >/=60% estimated needs orally to justify wean of TF  EN + PO will meet % estimated needs       MONITORING AND EVALUATION:  Progress towards goals will be monitored and evaluated per protocol and Practice Guidelines        Christiane Horner RD, LD  Clinical Dietitian   Weekend pager [phone]

## 2022-09-18 NOTE — PROVIDER NOTIFICATION
"Text sent to hospitalist: \"pt is reporting she is very anxious. has prn seroquel. can we get something scheduled for her as well? she has been anxious throughout the day.\"  "

## 2022-09-18 NOTE — CONSULTS
"Triage and Transition - Consult and Liaison     Julisa Chaney  September 18, 2022    Session start: 3:43 pm  Session end: 3:57 pm  Session duration in minutes: 16  [facility] utilized: [id number] - Psychotherapy (with patient) - 30 (16-37*) min  Patient was seen virtually ([employer] cart or other teleconferencing device).  Anticipated number of sessions or this episode of care: 1-4    Diagnosis:     Adjustment Disorders  309.28 (F43.23) With mixed anxiety and depressed mood, present;    296.32 (F33.1) Major Depressive Disorder, Recurrent Episode, Moderate _ and With melancholic features, by history;       Plan/Recommendations:     In reviewing records, it appears psychiatry recommended \"Start mirtazapine 7.5mg nightly, can increase to 15mg after a few days if tolerating well.\" According to patient's MAR, she has only been receiving 7.5mg and dose was not titrated up to 15mg yet. This was recommended to help with both mood and anxiety.     Will consult psych NP tomorrow to see if any additional recommendations can be made regarding medications.     Please enter another psychiatry consult if further visits are needed. Patients are not followed by Psychiatry C&L Service unless otherwise indicated.     Reason for consult: Julisa is 77 year old White  female . Psychiatry consult was requested due to anxiety. Patient was seen by [facility] Consult & Liaison team.     Presenting problem: Admitted to [facility] on 7/19/2022 for severe sepsis suspected due to COVID infection and suspected bacterial pneumonia. Her course has been complicated by prolonged severe acute metabolic encephalopathy and she is now found to have suspected intracerebral abscess causing acute ventriculitis, she was transferred to [facility] on 7/27 and underwent left frontal ventriculostomy on 7/31. Patent has struggled with delirium throughout hospitalization, as well as persistent anxiety and depression.     Session Summary: Patient initially pleasant and quiet, " "shortly after asking about her anxiety levels she begins yelling out. \"Please help me\" \"let me die\" theatrically. Patient reports constant anxiety, she has been utilizing seroquel. Patient states she feels lousy. Patient reports the small amount of medication doesn't help. She reports it comes back within minutes. All she wants to do is cry, can't even think straight. Patient reports \"people are on stablizing medications all the time\", stating when she was at the last hospital they had her on better medications. When asking about what triggers it, she states everything and it is Constant.  I attempt to review with her anxiety reducing techniques, including deep breathing and muscle relaxation, she states she has tried \"all of that stuff, I've done everything\". She reports it doesn't work, she can't concentrate. She states again \"I want to die\" \"give me something\" and \"send me to the [facility] where I can get pills\"/ Reports she has not experienced this before. Patient reports she only sleeps if she has \"a little bit of pills\". She states she cries and screams if not able.  Patient asks if mirtazipine is stronger than Seroquel. Says to give her something stronger. Patient does deny active suicidal ideation.     Mental Status Exam   Affect: Dramatic  Appearance: Appropriate   Attention Span/Concentration: Attentive    Eye Contact: Engaged  Fund of Knowledge: Delayed   Language /Speech Content: Fluent, expressive2  Language /Speech Volume: Loud   Language /Speech Rate/Productions: Normal   Recent Memory: Variable  Remote Memory: Variable  Mood: Irritable   Orientation:   Person: Yes   Place: Yes  Time of Day: No   Date: No   Situation (Do they understand why they are here?): No   Psychomotor Behavior: Agitated   Thought Content: Clear  Thought Form: Intact    Current medications:   Current Facility-Administered Medications   Medication     acetaminophen (TYLENOL) Suppository 650 mg     acetaminophen (TYLENOL) tablet " 650 mg     banatrol plus packet 1 packet     carboxymethylcellulose PF (REFRESH PLUS) 0.5 % ophthalmic solution 1 drop     cholestyramine (QUESTRAN) Packet 4 g     glucose gel 15-30 g    Or     dextrose 50 % injection 25-50 mL    Or     glucagon injection 1 mg     guaiFENesin (ROBITUSSIN) 20 mg/mL solution 10 mL     insulin aspart (NovoLOG) injection (RAPID ACTING)     insulin glargine (LANTUS PEN) injection 8 Units     ipratropium - albuterol 0.5 mg/2.5 mg/3 mL (DUONEB) neb solution 3 mL     levETIRAcetam (KEPPRA) solution 1,000 mg     melatonin tablet 1-3 mg     menthol-zinc oxide (CALMOSEPTINE) 0.44-20.6 % ointment OINT     metoprolol (LOPRESSOR) injection 5 mg     miconazole with skin protectant (MARLYS ANTIFUNGAL) 2 % cream     miconazole with skin protectant (MARLYS ANTIFUNGAL) 2 % cream     mirtazapine (REMERON) tablet TABS 7.5 mg     multivitamins w/minerals liquid 15 mL     naloxone (NARCAN) injection 0.2 mg    Or     naloxone (NARCAN) injection 0.4 mg    Or     naloxone (NARCAN) injection 0.2 mg    Or     naloxone (NARCAN) injection 0.4 mg     ondansetron (ZOFRAN ODT) ODT tab 4 mg    Or     ondansetron (ZOFRAN) injection 4 mg     OXcarbazepine (TRILEPTAL) suspension 450 mg     pantoprazole (PROTONIX) 2 mg/mL suspension 40 mg     prochlorperazine (COMPAZINE) injection 5 mg    Or     prochlorperazine (COMPAZINE) tablet 5 mg    Or     prochlorperazine (COMPAZINE) suppository 12.5 mg     QUEtiapine (SEROquel) half-tab 12.5 mg     QUEtiapine (SEROquel) tablet 50 mg     saccharomyces boulardii (FLORASTOR) capsule 250 mg     sodium chloride (PF) 0.9% PF flush 10-20 mL     sodium chloride (PF) 0.9% PF flush 10-40 mL     sodium chloride (PF) 0.9% PF flush 10-40 mL     topiramate (TOPAMAX) tablet 50 mg     traMADol (ULTRAM) tablet 50 mg     vancomycin (FIRVANQ) oral solution 125 mg            Therapeutic intervention and progress:  Therapeutic intervention consisted of active listening, validation and engaging in  learning/practicing coping skills. Patient is not making progress towards treatment goals as evidenced by reported levels of anxiety.       Vesta Bean, [facility]   Triage and Transition - Consult and Liaison   [phone]

## 2022-09-18 NOTE — PLAN OF CARE
Goal Outcome Evaluation:    Overall Patient Progress: no change    Outcome Evaluation: Baljinder TF at goal. Oral intake remains poor from calorie count information. Slight increase in TF today to better meet nutrient needs - Vital 1.5 at 60 mL/hr x 12 hrs overnight. Continue Banatrol for stool bulking

## 2022-09-19 ENCOUNTER — APPOINTMENT (OUTPATIENT)
Dept: OCCUPATIONAL THERAPY | Facility: CLINIC | Age: 77
DRG: 023 | End: 2022-09-19
Attending: INTERNAL MEDICINE
Payer: COMMERCIAL

## 2022-09-19 LAB
ANION GAP SERPL CALCULATED.3IONS-SCNC: 6 MMOL/L (ref 3–14)
BUN SERPL-MCNC: 16 MG/DL (ref 7–30)
CALCIUM SERPL-MCNC: 9.2 MG/DL (ref 8.5–10.1)
CHLORIDE BLD-SCNC: 103 MMOL/L (ref 94–109)
CO2 SERPL-SCNC: 27 MMOL/L (ref 20–32)
CREAT SERPL-MCNC: 0.5 MG/DL (ref 0.52–1.04)
GFR SERPL CREATININE-BSD FRML MDRD: >90 ML/MIN/1.73M2
GLUCOSE BLD-MCNC: 109 MG/DL (ref 70–99)
GLUCOSE BLDC GLUCOMTR-MCNC: 112 MG/DL (ref 70–99)
GLUCOSE BLDC GLUCOMTR-MCNC: 119 MG/DL (ref 70–99)
GLUCOSE BLDC GLUCOMTR-MCNC: 123 MG/DL (ref 70–99)
GLUCOSE BLDC GLUCOMTR-MCNC: 132 MG/DL (ref 70–99)
MAGNESIUM SERPL-MCNC: 2.4 MG/DL (ref 1.6–2.3)
PHOSPHATE SERPL-MCNC: 4.7 MG/DL (ref 2.5–4.5)
POTASSIUM BLD-SCNC: 4.1 MMOL/L (ref 3.4–5.3)
SODIUM SERPL-SCNC: 136 MMOL/L (ref 133–144)

## 2022-09-19 PROCEDURE — 97535 SELF CARE MNGMENT TRAINING: CPT | Mod: GO

## 2022-09-19 PROCEDURE — 250N000013 HC RX MED GY IP 250 OP 250 PS 637: Performed by: HOSPITALIST

## 2022-09-19 PROCEDURE — 97168 OT RE-EVAL EST PLAN CARE: CPT | Mod: GO

## 2022-09-19 PROCEDURE — 120N000001 HC R&B MED SURG/OB

## 2022-09-19 PROCEDURE — 250N000013 HC RX MED GY IP 250 OP 250 PS 637: Performed by: INTERNAL MEDICINE

## 2022-09-19 PROCEDURE — 83735 ASSAY OF MAGNESIUM: CPT | Performed by: HOSPITALIST

## 2022-09-19 PROCEDURE — 84100 ASSAY OF PHOSPHORUS: CPT | Performed by: HOSPITALIST

## 2022-09-19 PROCEDURE — 36415 COLL VENOUS BLD VENIPUNCTURE: CPT | Performed by: HOSPITALIST

## 2022-09-19 PROCEDURE — 99233 SBSQ HOSP IP/OBS HIGH 50: CPT | Performed by: INTERNAL MEDICINE

## 2022-09-19 PROCEDURE — 97530 THERAPEUTIC ACTIVITIES: CPT | Mod: GO

## 2022-09-19 PROCEDURE — 99232 SBSQ HOSP IP/OBS MODERATE 35: CPT | Mod: 25

## 2022-09-19 PROCEDURE — 80048 BASIC METABOLIC PNL TOTAL CA: CPT | Performed by: HOSPITALIST

## 2022-09-19 PROCEDURE — 250N000013 HC RX MED GY IP 250 OP 250 PS 637

## 2022-09-19 RX ORDER — QUETIAPINE FUMARATE 25 MG/1
25 TABLET, FILM COATED ORAL 2 TIMES DAILY
Status: DISCONTINUED | OUTPATIENT
Start: 2022-09-19 | End: 2022-10-04 | Stop reason: HOSPADM

## 2022-09-19 RX ORDER — LIDOCAINE 4 G/G
1 PATCH TOPICAL
Status: DISCONTINUED | OUTPATIENT
Start: 2022-09-19 | End: 2022-10-04 | Stop reason: HOSPADM

## 2022-09-19 RX ORDER — MIRTAZAPINE 15 MG/1
15 TABLET, FILM COATED ORAL AT BEDTIME
Status: DISCONTINUED | OUTPATIENT
Start: 2022-09-19 | End: 2022-10-04 | Stop reason: HOSPADM

## 2022-09-19 RX ADMIN — Medication 250 MG: at 20:37

## 2022-09-19 RX ADMIN — CHOLESTYRAMINE 4 G: 4 POWDER, FOR SUSPENSION ORAL at 08:05

## 2022-09-19 RX ADMIN — TRAMADOL HYDROCHLORIDE 50 MG: 50 TABLET, COATED ORAL at 09:18

## 2022-09-19 RX ADMIN — QUETIAPINE FUMARATE 50 MG: 50 TABLET ORAL at 22:36

## 2022-09-19 RX ADMIN — Medication 3 MG: at 22:46

## 2022-09-19 RX ADMIN — Medication 1 PACKET: at 12:59

## 2022-09-19 RX ADMIN — QUETIAPINE FUMARATE 25 MG: 25 TABLET ORAL at 15:06

## 2022-09-19 RX ADMIN — VANCOMYCIN HYDROCHLORIDE 125 MG: KIT at 17:49

## 2022-09-19 RX ADMIN — Medication 40 MG: at 08:07

## 2022-09-19 RX ADMIN — LEVETIRACETAM 1000 MG: 100 SOLUTION ORAL at 08:06

## 2022-09-19 RX ADMIN — MICONAZOLE NITRATE: 20 CREAM TOPICAL at 20:38

## 2022-09-19 RX ADMIN — VANCOMYCIN HYDROCHLORIDE 125 MG: KIT at 08:06

## 2022-09-19 RX ADMIN — Medication 15 ML: at 08:06

## 2022-09-19 RX ADMIN — Medication 250 MG: at 08:05

## 2022-09-19 RX ADMIN — MICONAZOLE NITRATE: 20 CREAM TOPICAL at 08:17

## 2022-09-19 RX ADMIN — ACETAMINOPHEN 650 MG: 325 TABLET, FILM COATED ORAL at 09:10

## 2022-09-19 RX ADMIN — QUETIAPINE 12.5 MG: 25 TABLET, FILM COATED ORAL at 09:10

## 2022-09-19 RX ADMIN — INSULIN GLARGINE 8 UNITS: 100 INJECTION, SOLUTION SUBCUTANEOUS at 22:48

## 2022-09-19 RX ADMIN — CHOLESTYRAMINE 4 G: 4 POWDER, FOR SUSPENSION ORAL at 20:37

## 2022-09-19 RX ADMIN — VANCOMYCIN HYDROCHLORIDE 125 MG: KIT at 22:36

## 2022-09-19 RX ADMIN — TOPIRAMATE 50 MG: 50 TABLET ORAL at 22:36

## 2022-09-19 RX ADMIN — TRAMADOL HYDROCHLORIDE 25 MG: 50 TABLET ORAL at 17:49

## 2022-09-19 RX ADMIN — Medication 1 PACKET: at 08:07

## 2022-09-19 RX ADMIN — OXCARBAZEPINE 450 MG: 300 SUSPENSION ORAL at 22:36

## 2022-09-19 RX ADMIN — LEVETIRACETAM 1000 MG: 100 SOLUTION ORAL at 20:37

## 2022-09-19 RX ADMIN — VANCOMYCIN HYDROCHLORIDE 125 MG: KIT at 12:59

## 2022-09-19 RX ADMIN — LIDOCAINE 1 PATCH: 560 PATCH PERCUTANEOUS; TOPICAL; TRANSDERMAL at 11:32

## 2022-09-19 RX ADMIN — ACETAMINOPHEN 650 MG: 325 TABLET, FILM COATED ORAL at 14:32

## 2022-09-19 RX ADMIN — Medication 1 PACKET: at 17:49

## 2022-09-19 RX ADMIN — MIRTAZAPINE 15 MG: 15 TABLET, FILM COATED ORAL at 22:36

## 2022-09-19 RX ADMIN — ACETAMINOPHEN 650 MG: 325 TABLET, FILM COATED ORAL at 22:39

## 2022-09-19 ASSESSMENT — ACTIVITIES OF DAILY LIVING (ADL)
ADLS_ACUITY_SCORE: 41

## 2022-09-19 NOTE — PROGRESS NOTES
09/19/22 1512   Quick Adds   Type of Visit Occupational Therapy Re-evaluation   Living Environment   People in Home grandchild(geetha)   Current Living Arrangements house   Transportation Anticipated family or friend will provide   Self-Care   Usual Activity Tolerance moderate   Current Activity Tolerance poor   Equipment Currently Used at Home grab bar, tub/shower   Activity/Exercise/Self-Care Comment Pt with low activity level prior to hospitalization. Pt states she stays at home and does not leave much. Has never driven. Grandson gets groceries. Pt does state that she was indep with self-cares and funcitonal mobility. Does own cooking and cleaning.   General Information   Onset of Illness/Injury or Date of Surgery 07/27/22   Referring Physician Dr. Prieto   Additional Occupational Profile Info/Pertinent History of Current Problem Julisa Chaney is a 77 year old female admitted on 7/27/2022.  PMH of trigeminal neuralgia who was recently admitted to [facility] on 7/19 for severe sepsis due to COVID-19 infection and suspected bacterial pneumonia. She was treated with 5 days of remdesivir and did not require any steroids, also treated with IV vancomycin and IV meropenem. 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  She was switched to IV vancomycin, cefepime and metronidazole and transferred to [facility] for neurosurgery assessment. MRI brain w/wo contrast 7/30 showed ventriculitis with probable abscess. Neurosurgery/neurology/infectious disease/oncology consulted. Patient taken to the OR on 7/31 for left frontal ventriculostomy.   Since then has remained on broad spectrum antibiotics.  In addition she has developed C. Difficile colitis and is on  "treatment.  She has continued to have a significant encephalopathy which is very slowly improving.   Existing Precautions/Restrictions fall   Limitations/Impairments safety/cognitive   General Observations and Info Therapy re-consulted secondary to patient's williness to participate   Cognitive Status Examination   Orientation Status person;place;time   Affect/Mental Status (Cognitive) anxious   Follows Commands follows one-step commands;over 90% accuracy   Cognitive Status Comments Orientated, comversational, expresses remourse as to declining therapy before stating, \"I didn't know what I was doing\".   Pain Assessment   Patient Currently in Pain Yes, see Vital Sign flowsheet  (frequently stating, \"ow ow ow\" throughout session. Pt reports this is a deflection as to her anxiety. \"I do not know what else to do or say to handle it so I just say ow\".)   Posture   Posture forward head position;protracted shoulders;kyphosis   Range of Motion Comprehensive   General Range of Motion upper extremity range of motion deficits identified   Comment, General Range of Motion R UE to 100deg shld flex, L UE 80deg shld flex   Strength Comprehensive (MMT)   General Manual Muscle Testing (MMT) Assessment upper extremity strength deficits identified   Comment, General Manual Muscle Testing (MMT) Assessment Generalized weakness, 3-4/5   Bed Mobility   Bed Mobility rolling left;rolling right;supine-sit   Rolling Left [place] (Bed Mobility) supervision   Rolling Right [place] (Bed Mobility) supervision   Supine-Sit [place] (Bed Mobility) minimum assist (75% patient effort)   Transfers   Transfer Comments Did not attempt at this time   Clinical Impression   Criteria for Skilled Therapeutic Interventions Met (OT) Yes, treatment indicated   OT Diagnosis impaired functional mobility and self cares   OT Problem List-Impairments impacting ADL problems related to;activity tolerance impaired;cognition;coordination;fear & " anxiety;mobility;range of motion (ROM);strength   Assessment of Occupational Performance 5 or more Performance Deficits   Identified Performance Deficits bathing. dressing, toileting, feeding, grooming, mobility   Planned Therapy Interventions (OT) ADL retraining;cognition;strengthening;transfer training;bed mobility training;neuromuscular re-education;progressive activity/exercise   Clinical Decision Making Complexity (OT) high complexity   Risk & Benefits of therapy have been explained evaluation/treatment results reviewed;care plan/treatment goals reviewed   OT Discharge Planning   OT Discharge Recommendation (DC Rec) Transitional Care Facility;home with assist;home with home care occupational therapy   OT Rationale for DC Rec Pt alert and orinetated x4 this date. Agrees to therapy. High anxiety limiting at times. Does not refuse. Agreeable to EOB activities, delines standing/transfers at this time.Pt would benefit from TCU setting to increase strength and activity tolerance for participation in ADLs. Did not transfer OOB this date so unsure level of assist needed if home d/c was an options.   Total Evaluation Time (Minutes)   Total Evaluation Time (Minutes) 8   OT Goals   Therapy Frequency (OT) 5 times/wk   OT Predicted Duration/Target Date for Goal Attainment 09/30/22   OT Goals Hygiene/Grooming;Lower Body Dressing;Bed Mobility;Toilet Transfer/Toileting;Cognition;OT Goal 1   OT: Hygiene/Grooming supervision/stand-by assist   OT: Upper Body Dressing Supervision/stand-by assist   OT: Lower Body Dressing Minimal assist   OT: Bed Mobility Modified independent   OT: Toilet Transfer/Toileting Supervision/stand-by assist   OT: Cognitive Patient/caregiver will verbalize understanding of cognitive assessment results/recommendations as needed for safe discharge planning   OT: Goal 1 Pt will demo understanding of HEP for UB strengthening to improve participation in self cares

## 2022-09-19 NOTE — CONSULTS
"      Initial Psychiatric Consult   Consult date: September 19, 2022         Reason for Consult, requesting source:    Medication follow up  Requesting source: Sekou Harvey    This note is being entered to supplement the psychiatry consultation note that was completed on September 18, 2022 by the licensed mental health professional Vesta Bean. They have reviewed with me the pertinent clinical details related to their encounter. I am being consulted to offer additional guidance on psychiatric pharmacological interventions.         HPI:   Julisa Chaney is a pleasant 77 year old woman with past medical history that is most notable for trigeminal neuralgia, among others; who was admitted to [facility] on 7/19/2022 for severe sepsis suspected due to COVID infection and suspected bacterial pneumonia. Her course has been complicated by prolonged severe acute metabolic encephalopathy and she is now found to have suspected intracerebral abscess causing acute ventriculitis, she was transferred to [facility] on 7/27 and underwent left frontal ventriculostomy on 7/31. During course of hospitalization she had developed delirium which is improving.     I met with Julisa in her room where she is initially seen sleeping but awakens to verbal prompt. She states she is \"just terrible\", complaining of severe anxiety and \"bad\", depressed mood. She reports difficulty concentrating, unable to participate in any of her usual activities or even watch TV because she can't follow the story. She reports that anxiety gets so bad that she is unable to speak at times, describes feeling that her head is so full of thoughts that she can't find any words to say. She does think the quetiapine helps anxiety but has a hard time remembering her medications, states \"they give it to me one day but not the next\". She endorses passive suicidal ideation, \"I don't want to die, I want to live, but I can't live like this\". She identifies " "protective factors against suicide as family and Shinto beliefs. She denies any specific plan or intent to act on suicidal thoughts, and has not made any attempt to harm herself in the hospital. She asks for medication to help both her mood and anxiety, is actually very focused on getting medication. She talks about the various medications she takes at home, but has difficulty recalling names or why she takes them, only what they look like \"a little white pill\". She does recall oxcarbazepine by name, unsure why she takes this but thinks it is for anxiety. She is oriented to person, place, partially to time (September 22, 2022 \"yesterday they said it was my birthday\"), and partially to situation (states she is here for a \"brain thing\" but unable to specify or detail treatment she has received, including surgery).             Physical ROS:   The 10 point Review of Systems is negative other than noted in the HPI or here.           Medications:       banatrol plus  1 packet Per Feeding Tube TID w/meals     cholestyramine  1 packet Oral BID     insulin aspart  1-6 Units Subcutaneous Q8H     insulin glargine  8 Units Subcutaneous At Bedtime     levETIRAcetam  1,000 mg Oral or Feeding Tube Q12H     miconazole with skin protectant   Topical BID     mirtazapine  15 mg Oral or Feeding Tube At Bedtime     multivitamins w/minerals  15 mL Per Feeding Tube Daily     OXcarbazepine  450 mg Oral or Feeding Tube At Bedtime     pantoprazole  40 mg Oral or Feeding Tube QAM AC     QUEtiapine  50 mg Oral At Bedtime     saccharomyces boulardii  250 mg Per Feeding Tube BID     sodium chloride (PF)  10-40 mL Intracatheter Q8H     sodium chloride (PF)  10-40 mL Intracatheter Q7 Days     topiramate  50 mg Oral or Feeding Tube At Bedtime     vancomycin  125 mg Oral or Feeding Tube 4x Daily            Physical and Psychiatric Examination:     /50   Pulse 69   Temp 97.6  F (36.4  C) (Axillary)   Resp 16   Wt 49 kg (108 lb)   SpO2 " "97%   BMI 17.97 kg/m    Weight is 108 lbs 0 oz  Body mass index is 17.97 kg/m .    Physical Exam:  I have reviewed the physical exam as documented by by the medical team and agree with findings and assessment and have no additional findings to add at this time.    Mental Status Exam:  Appearance: awake, alert and unkempt  Attitude:  cooperative  Eye Contact:  poor   Mood:  \"just terrible\"  Affect:  mood congruent and intensity is blunted  Speech:  clear, coherent  Psychomotor Behavior:  no evidence of tardive dyskinesia, dystonia, or tics and fidgeting  Throught Process:  linear and goal oriented  Associations:  no loose associations  Thought Content:  no evidence of psychotic thought and passive suicidal ideation present  Insight:  limited  Judgement:  limited  Oriented to:  self, place, partially to time, partially to situation  Attention Span and Concentration:  limited  Recent and Remote Memory:  limited  Language: able to name/identify objects without impairment  Fund of Knowledge: intact with awareness of current and past events             DSM-5 Diagnosis:   Delirium, ongoing  Major depressive disorder, recurrent episode, severe, with anxious distress          Assessment:   Julisa continues to report severe anxiety and depression with passive suicidal ideation. She does think quetiapine is helpful for anxiety. Mirtazapine was just increased to 15mg starting tonight, so no changes to this at this time. She has been getting quetiapine during day time fairly consistently, could go ahead and schedule 25mg BID and 50mg at bedtime to help with sleep. She asks about oxcarbazepine, but per chart review she was prescribed this along with levetiracetam, nortriptyline, and topiramate for trigeminal neuralgia- will defer to primary team to restart these as appropriate.    She does endorse passive suicidal ideation with no plan to intent to act on these thoughts. She has not made any attempt to harm herself in the " "hospital. She agrees to reach out to staff if she begins to feel unsafe. Recommend nursing assess for suicidal ideation, intent, and plan at least once per shift. If she expresses plan or intent, place on 1:1 sitter, suicide precautions, and reconsult psychiatry.    I attempted to call daughter Alissa to discuss recommendations, no answer.          Summary of Recommendations:   1. Mirtazapine was just increased to 15mg starting tonight, 9/19. This will hopefully help with mood, anxiety, and sleep.    2. Recommend scheduling quetiapine 25mg BID (morning and afternoon) in addition to nightly dose of 50mg. Scheduling this may help keep anxiety lower at baseline.     3. Will defer management of home medication nortriptyline to primary team as it appears she was taking these for trigeminal neuralgia. Looks like it was previously held due to somnolence.    4. Please reconsult psychiatry as needed        DANIELLE Bacon [facility]  Consult/Liaison Psychiatry   [facility]    Contact information available via [facility] Paging/Directory  If I am not available, then [facility] CL line ([phone]) should know who is covering our consult service.            \"This dictation was performed with voice recognition software and may contain errors,  omissions and inadvertent word substitution.\"           "

## 2022-09-19 NOTE — PLAN OF CARE
Reason for Admission: Cerebral abscess     Cognitive/Mentation: A&O x4  Neuros/CMS: Intact ex generalized weakness, forgetful  VS: Stable on RA   GI: BS active, + flatus. Incontinent. 1 BM 9/19/22.  : Voiding. Incontinent.   Pulmonary: LS clear.  Pain: HA, Back, stomach. Less complaints of stomach pain today. Back pain treated with lidocaine patch to lower back. Appears to be effectively. Tramadol was changed to Q8h.     Drains/Lines: 1 PIV, PEG Tube  Skin: Redness in perineum/buttocks--definite improvement. Continuing to apply antifungal barrier cream.   Activity: Assist x1-2 Lift  Diet: Soft, bite-sized with thin liquids. Takes pills whole or in PEG.      Discharge: LTC v home. Dependent on placement and participation in therapies.      Aggression Stoplight Tool: Green     End of shift summary: Patient successfully participated in OT today. Plan for PT/OT tomorrow.

## 2022-09-19 NOTE — PROGRESS NOTES
Care Management Follow Up    Length of Stay (days): 54    Expected Discharge Date: 09/20/2022     Concerns to be Addressed:       Patient plan of care discussed at interdisciplinary rounds: Yes    Anticipated Discharge Disposition: LTC vs home   Anticipated Discharge Services:    Anticipated Discharge DME:          Education Provided on the Discharge Plan:  SW to follow up with daughter later today regarding additional referrals if needed for LTC placement.  Patient/Family in Agreement with the Plan:  Referrals Placed by CM/SW:   Private pay costs discussed:  LTC would be private pay . Pt no longer meets criteria for TCU placement as therapy has signed off. Pt not participating in therapy.  Additional Information:  SW will need to send additional referrals for LTC placement if that is what family chooses.    WILEY King  [facility]  Care Transitions  [phone]

## 2022-09-19 NOTE — PLAN OF CARE
Pt here with Brain abscess. A&O x4,  Neuros unchanged. VSS. Soft bite sized diet, with thin liquids. Takes pills whole. PEG tube in place, TF started 8pm as ordered, due to be stopped at 8am. Running at 60ml/hr with Q4 200 ml FWF. Incontinent of B&B. Up with A2/lift. PRN Melatonin given at bedtime. Pt scoring yellow on the Aggression Stop Light  Tool. Discharge pending TCU.

## 2022-09-19 NOTE — PROGRESS NOTES
[facility]    Medicine Progress Note - Hospitalist Service    Date of Admission:  7/27/2022    Assessment & Plan        Julisa Chaney is a 77 year old female admitted on 7/27/2022.  PMH of trigeminal neuralgia who was recently admitted to [facility] on 7/19 for severe sepsis due to COVID-19 infection and suspected bacterial pneumonia. She was treated with 5 days of remdesivir and did not require any steroids, also treated with IV vancomycin and IV meropenem.     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  She was switched to IV vancomycin, cefepime and metronidazole and transferred to [facility] for neurosurgery assessment. MRI brain w/wo contrast 7/30 showed ventriculitis with probable abscess. Neurosurgery/neurology/infectious disease/oncology consulted. Patient taken to the OR on 7/31 for left frontal ventriculostomy.   Since then has remained on broad spectrum antibiotics.  In addition she has developed C. Difficile colitis and is on treatment.  She has continued to have a significant encephalopathy which is very slowly improving.     Severe sepsis secondary to ventriculitis with left lateral ventricle abscess. Resolved  S/p left frontal ventriculostomy 7/31/22 with subsequent EVD removal after 8 days  * Appreciate neurosurgery, oncology, ID assistance.  * flow cytometry did not show any evidence of malignancy.  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 The left temporal horn remains slightly dilated but no samuel hydrocephalus. No acute changes.  - Remains afebrile; leukocytosis resolved ; was initially on vancomycin, cefepime, and metronidazole, then Meropenem and Vancomycin; ID signed off, completed 6 weeks treatment until 9/8/22.  - Trying to avoid any narcotics with her confusion/ encephalopathy.  - Progress has been slow and fairly minimal. Concern about overall prognosis. Briefly discussed palliative care with patient's daughter on 9/6/22. She requested re-evaluation from psychiatry regarding depression (completed 9/8/22, see consult note). Seems to want to see how she does with the medication prior to having any discussion about palliative care.     Acute infectious encephalopathy due to above.  Resolved   Chronic pain, likely exacerbated by immobility  * mentation much improved since admission ; fluctuating at times ; will not engage in conversation most of the times.  * 8/23 discontinued prn benadryl, narcotics; minimize narcotic.  - Continue to treat underlying issues as noted above  - Tylenol PRN   - Added Lidoderm patches for back  - Maintain normal sleep/wake cycle as able  - Use minimal single dosing of narcotic if absolutely needed for pain, no standing order   - Decreased PRN ultram to 25 mg q8h as patient reports concerns with \"being foggy\"    Suicidal throughs/agitation  Depression  YUE   * With improvement in her mentation, she has started becoming at times restless and agitated. On 8/27, reported having suicidal thoughts. No plans/attempts.  * initiated suicide precautions and sitter, evaluated by psychiatry service, discontinued.   - Reassess periodically for ongoing need  - Anti depressant " not recommended currently in the setting of delirium/encephalopathy.  - HS seroquel increased to 50 mg 8/31  - Psych re-consulted on 9/8/22, restarted remeron (see consult note  - PRN Seroquel   - Increased Remeron to 15 mg  - Reconsulted psychiatry as patient continues to have issues with anxiety.  Added Seroquel 25 mg BID to her evening regimen      C. difficile colitis  Patient was noted to have copious diarrhea on 8/1 and was noted positive for C. Difficile.  * Rectal tube came off 8/28 still with some diarrhea but not severe  - Continue oral vancomycin - till 9/22  - Continue questran from 4g daily to BID, loose stools are improving  - Continue probiotic  - Barrier cream for skin breakdown     Acute on chronic anemia  Prior labs show baseline hemoglobin of about 9-10.   * Hb on admission was 11.7, slowly trended down, got 1 unit PRBC on 8/8/22 for Hb 6.9  * Received IV iron infusion x3 with first on 8/11/22.  - Iron panel on 7/30/22 showed that her total iron was low at 14, binding capacity was low at 28, iron saturation was 6 and B12 was high and folate was normal  * No obvious ongoing bleeding noted; Hb has now remained stable around 9 to 10 on 8/26  - Continue to monitor hgb periodically     History of trigeminal neuralgia  - Continue PTA Trileptal , Keppra and Topamax     History of chronic pyloric ulcer  She had EGD done in 2020 which showed gastroduodenitis with a duodenal stricture.  - Continue PPI     Hyperglycemia, resolved  Recent HbA1c was 5.6 on 7/19/22. Was not on any medication prior to admission.  - Continue medium dose sliding scale insulin  - Requiring no supplemental insulin and no hypoglycemia, glucose checks q8h during the day and at 0200  - Decreased lantus to 8 units nightly 9/2  - Blood sugars well controlled      Intermittent Hypokalemia, Hypophosphatemia  - Replacement per protocol     Hypernatremia. Resolved  - Continue free water flushes 200 ml every 4 hrs     Severe dysphagia  Severe  protein calorie malnutrition  - Nutrition following for tube feeds via G tube (placed 8/16) ; getting free water flushes.  - On calorie counts with DD6 diet     Physical deconditioning  - Will need TCU placement; declined by LTACH  - PT/OT signed off due to lack of patient participation.  Patient states she is now agreeable to working with them so re-consulted  - SW following for disposition.  Unfortunately due to the patient's lack of participation she has been declined by TCUs as well.  Now that she agreeable though hopefully they can re-assess.  Ultimately if patient continues to refuse and/or she is declined from all TCUs will need to discuss with daughter about discharge to home vs private pay for LTC           Diet: Combination Diet Soft and Bite Sized Diet (level 6); Thin Liquids (level 0) (pt prefers straws)  Snacks/Supplements Adult: Other; L: mota Magic Cup, PRN sup w/ dinner; With Meals  Adult Formula Drip Feeding: Continuous Vital 1.5; Gastrostomy; Goal Rate: 60 mL/hr x 12 hrs (8pm-8am); mL/hr; Medication - Feeding Tube Flush Frequency: At least 15-30 mL water before and after medication administration and with tube clogging; Estella...    DVT Prophylaxis: Pneumatic Compression Devices  Abrams Catheter: Not present  Central Lines: None  Cardiac Monitoring: None  Code Status: Full Code      Disposition Plan      Expected Discharge Date: 09/20/2022    Discharge Delays: Insurance Authorization needed  Placement - TCU  Destination: inpatient rehabilitation facility  Discharge Comments: TCU pending, they may decline. Difficult placement        The patient's care was discussed with the Bedside Nurse, Care Coordinator/ and Patient.    Time Spent on this Encounter   I spent 40 minutes on the unit/floor managing the care of the patient.  Over 50% of my time was spent on the following:   - Counseling the patient and/or family regarding:  Diagnostic work up so far, treatment options and disposition  planning  - Coordination of care with the: Patient and social work     Diego Prieto,   Hospitalist Service  [facility]  Securely message with the [employer] Web Console (learn more here)  Text page via [employer] Paging/Directory         Clinically Significant Risk Factors Present on Admission                      ______________________________________________________________________    Interval History   Patient seen and examined.  No acute events over night.  Continues to frequently call out with multiple of issues from diffuse pain, anxiety, and back pain.  On my evaluation she is unable to specify exactly what is bother at that time but is moaning.  Long discussion with patient that a barrier to her care has been her cooperation with therapies.  Patient is adamant that she would participate if they came back and is agreeable to them being consulted.  No fevers noted.  No vomiting.  Tolerating diet/tube feeds     Data reviewed today: I reviewed all medications, new labs and imaging results over the last 24 hours. I personally reviewed no images or EKG's today.    Physical Exam   Vital Signs: Temp: 97.9  F (36.6  C) Temp src: Axillary BP: 105/49 Pulse: 75   Resp: 14 SpO2: 99 % O2 Device: None (Room air)    Weight: 108 lbs 0 oz  General Appearance: Sleeping.  Resting comfortably. NAD   Respiratory: Clear to auscultation.  No respiratory distress  Cardiovascular: RRR.  No obvious murmurs  GI: Soft.  Non-distended  Skin: No obvious rashes or cyanosis to exposed skin  Other: Arousable.  No edema      Data   Recent Labs   Lab 09/19/22  0850 09/19/22  0814 09/18/22  2357 09/18/22  0840 09/18/22  0828 09/17/22  1235 09/17/22  0956 09/16/22  1156 09/16/22  0846   WBC  --   --   --   --   --   --   --   --  9.0   HGB  --   --   --   --   --   --   --   --  10.7*   MCV  --   --   --   --   --   --   --   --  82   PLT  --   --   --   --   --   --   --   --  546*   NA  --  136  --   --   --   --   --    --  134   POTASSIUM  --  4.1  --   --  4.0  --  4.2  --  4.1   CHLORIDE  --  103  --   --   --   --   --   --  102   CO2  --  27  --   --   --   --   --   --  26   BUN  --  16  --   --   --   --   --   --  19   CR  --  0.50*  --   --   --   --   --   --  0.49*   ANIONGAP  --  6  --   --   --   --   --   --  6   ADY  --  9.2  --   --   --   --   --   --  9.2   * 109* 123*   < >  --    < >  --    < > 117*    < > = values in this interval not displayed.     No results found for this or any previous visit (from the past 24 hour(s)).

## 2022-09-20 ENCOUNTER — APPOINTMENT (OUTPATIENT)
Dept: OCCUPATIONAL THERAPY | Facility: CLINIC | Age: 77
DRG: 023 | End: 2022-09-20
Attending: INTERNAL MEDICINE
Payer: COMMERCIAL

## 2022-09-20 ENCOUNTER — APPOINTMENT (OUTPATIENT)
Dept: PHYSICAL THERAPY | Facility: CLINIC | Age: 77
DRG: 023 | End: 2022-09-20
Attending: INTERNAL MEDICINE
Payer: COMMERCIAL

## 2022-09-20 ENCOUNTER — APPOINTMENT (OUTPATIENT)
Dept: SPEECH THERAPY | Facility: CLINIC | Age: 77
DRG: 023 | End: 2022-09-20
Attending: INTERNAL MEDICINE
Payer: COMMERCIAL

## 2022-09-20 LAB
GLUCOSE BLDC GLUCOMTR-MCNC: 109 MG/DL (ref 70–99)
GLUCOSE BLDC GLUCOMTR-MCNC: 113 MG/DL (ref 70–99)
GLUCOSE BLDC GLUCOMTR-MCNC: 126 MG/DL (ref 70–99)
GLUCOSE BLDC GLUCOMTR-MCNC: 91 MG/DL (ref 70–99)
PHOSPHATE SERPL-MCNC: 4.5 MG/DL (ref 2.5–4.5)
POTASSIUM BLD-SCNC: 4.1 MMOL/L (ref 3.4–5.3)

## 2022-09-20 PROCEDURE — 36415 COLL VENOUS BLD VENIPUNCTURE: CPT | Performed by: INTERNAL MEDICINE

## 2022-09-20 PROCEDURE — 97164 PT RE-EVAL EST PLAN CARE: CPT | Mod: GP

## 2022-09-20 PROCEDURE — 250N000013 HC RX MED GY IP 250 OP 250 PS 637: Performed by: INTERNAL MEDICINE

## 2022-09-20 PROCEDURE — 97535 SELF CARE MNGMENT TRAINING: CPT | Mod: GO

## 2022-09-20 PROCEDURE — 250N000013 HC RX MED GY IP 250 OP 250 PS 637: Performed by: HOSPITALIST

## 2022-09-20 PROCEDURE — 92526 ORAL FUNCTION THERAPY: CPT | Mod: GN | Performed by: SPEECH-LANGUAGE PATHOLOGIST

## 2022-09-20 PROCEDURE — 99232 SBSQ HOSP IP/OBS MODERATE 35: CPT | Performed by: INTERNAL MEDICINE

## 2022-09-20 PROCEDURE — 84132 ASSAY OF SERUM POTASSIUM: CPT | Performed by: INTERNAL MEDICINE

## 2022-09-20 PROCEDURE — 97530 THERAPEUTIC ACTIVITIES: CPT | Mod: GP

## 2022-09-20 PROCEDURE — 84100 ASSAY OF PHOSPHORUS: CPT | Performed by: INTERNAL MEDICINE

## 2022-09-20 PROCEDURE — 250N000013 HC RX MED GY IP 250 OP 250 PS 637

## 2022-09-20 PROCEDURE — 97530 THERAPEUTIC ACTIVITIES: CPT | Mod: GO

## 2022-09-20 PROCEDURE — 120N000001 HC R&B MED SURG/OB

## 2022-09-20 RX ADMIN — Medication 1 PACKET: at 09:36

## 2022-09-20 RX ADMIN — CHOLESTYRAMINE 4 G: 4 POWDER, FOR SUSPENSION ORAL at 20:19

## 2022-09-20 RX ADMIN — VANCOMYCIN HYDROCHLORIDE 125 MG: KIT at 21:27

## 2022-09-20 RX ADMIN — MICONAZOLE NITRATE: 20 CREAM TOPICAL at 20:23

## 2022-09-20 RX ADMIN — QUETIAPINE FUMARATE 50 MG: 50 TABLET ORAL at 21:26

## 2022-09-20 RX ADMIN — MICONAZOLE NITRATE: 20 CREAM TOPICAL at 09:46

## 2022-09-20 RX ADMIN — CHOLESTYRAMINE 4 G: 4 POWDER, FOR SUSPENSION ORAL at 09:36

## 2022-09-20 RX ADMIN — LEVETIRACETAM 1000 MG: 100 SOLUTION ORAL at 09:36

## 2022-09-20 RX ADMIN — TOPIRAMATE 50 MG: 50 TABLET ORAL at 21:26

## 2022-09-20 RX ADMIN — ACETAMINOPHEN 650 MG: 325 TABLET, FILM COATED ORAL at 14:26

## 2022-09-20 RX ADMIN — OXCARBAZEPINE 450 MG: 300 SUSPENSION ORAL at 21:27

## 2022-09-20 RX ADMIN — LEVETIRACETAM 1000 MG: 100 SOLUTION ORAL at 20:21

## 2022-09-20 RX ADMIN — LIDOCAINE 1 PATCH: 560 PATCH PERCUTANEOUS; TOPICAL; TRANSDERMAL at 09:30

## 2022-09-20 RX ADMIN — VANCOMYCIN HYDROCHLORIDE 125 MG: KIT at 09:36

## 2022-09-20 RX ADMIN — Medication 15 ML: at 09:36

## 2022-09-20 RX ADMIN — ACETAMINOPHEN 650 MG: 325 TABLET, FILM COATED ORAL at 18:48

## 2022-09-20 RX ADMIN — Medication 1 PACKET: at 17:48

## 2022-09-20 RX ADMIN — TRAMADOL HYDROCHLORIDE 25 MG: 50 TABLET ORAL at 01:54

## 2022-09-20 RX ADMIN — Medication 250 MG: at 20:19

## 2022-09-20 RX ADMIN — Medication 1 PACKET: at 13:06

## 2022-09-20 RX ADMIN — TRAMADOL HYDROCHLORIDE 25 MG: 50 TABLET ORAL at 12:26

## 2022-09-20 RX ADMIN — ANORECTAL OINTMENT: 15.7; .44; 24; 20.6 OINTMENT TOPICAL at 17:40

## 2022-09-20 RX ADMIN — QUETIAPINE FUMARATE 25 MG: 25 TABLET ORAL at 08:55

## 2022-09-20 RX ADMIN — ACETAMINOPHEN 650 MG: 325 TABLET, FILM COATED ORAL at 05:40

## 2022-09-20 RX ADMIN — Medication 40 MG: at 09:36

## 2022-09-20 RX ADMIN — QUETIAPINE FUMARATE 25 MG: 25 TABLET ORAL at 16:55

## 2022-09-20 RX ADMIN — TRAMADOL HYDROCHLORIDE 25 MG: 50 TABLET ORAL at 20:19

## 2022-09-20 RX ADMIN — INSULIN GLARGINE 8 UNITS: 100 INJECTION, SOLUTION SUBCUTANEOUS at 21:27

## 2022-09-20 RX ADMIN — VANCOMYCIN HYDROCHLORIDE 125 MG: KIT at 17:47

## 2022-09-20 RX ADMIN — MIRTAZAPINE 15 MG: 15 TABLET, FILM COATED ORAL at 21:26

## 2022-09-20 RX ADMIN — VANCOMYCIN HYDROCHLORIDE 125 MG: KIT at 13:06

## 2022-09-20 RX ADMIN — Medication 250 MG: at 09:36

## 2022-09-20 ASSESSMENT — ACTIVITIES OF DAILY LIVING (ADL)
ADLS_ACUITY_SCORE: 41

## 2022-09-20 NOTE — PROGRESS NOTES
[facility]    Medicine Progress Note - Hospitalist Service    Date of Admission:  7/27/2022    Assessment & Plan         Julisa Chaney is a 77 year old female admitted on 7/27/2022.  PMH of trigeminal neuralgia who was recently admitted to [facility] on 7/19 for severe sepsis due to COVID-19 infection and suspected bacterial pneumonia. She was treated with 5 days of remdesivir and did not require any steroids, also treated with IV vancomycin and IV meropenem.     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  She was switched to IV vancomycin, cefepime and metronidazole and transferred to [facility] for neurosurgery assessment. MRI brain w/wo contrast 7/30 showed ventriculitis with probable abscess. Neurosurgery/neurology/infectious disease/oncology consulted. Patient taken to the OR on 7/31 for left frontal ventriculostomy.   Since then has remained on broad spectrum antibiotics.  In addition she has developed C. Difficile colitis and is on treatment.  She has continued to have a significant encephalopathy which is very slowly improving.     Severe sepsis secondary to ventriculitis with left lateral ventricle abscess. Resolved  S/p left frontal ventriculostomy 7/31/22 with subsequent EVD removal after 8 days  * Appreciate neurosurgery, oncology, ID assistance.  * flow cytometry did not show any evidence of malignancy.  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 The left temporal horn remains slightly dilated but no samuel hydrocephalus. No acute changes.  - Remains afebrile; leukocytosis resolved ; was initially on vancomycin, cefepime, and metronidazole, then Meropenem and Vancomycin; ID signed off, completed 6 weeks treatment until 9/8/22.  - Trying to avoid any narcotics with her confusion/ encephalopathy.  - Progress has been slow and fairly minimal. Concern about overall prognosis. Briefly discussed palliative care with patient's daughter on 9/6/22. She requested re-evaluation from psychiatry regarding depression (completed 9/8/22, see consult note). Seems to want to see how she does with the medication prior to having any discussion about palliative care.     Acute infectious encephalopathy due to above.  Resolved   Chronic pain, likely exacerbated by immobility  * mentation much improved since admission ; fluctuating at times ; will not engage in conversation most of the times.  * 8/23 discontinued prn benadryl, narcotics; minimize narcotic.  - Continue to treat underlying issues as noted above  - Tylenol PRN   - Continue Lidoderm patches for back  - Maintain normal sleep/wake cycle as able  - Use minimal single dosing of narcotic if absolutely needed for pain, no standing order   - Decreased PRN ultram to 25 mg q8h as patient reports concerns with \"being foggy\"    Suicidal throughs/agitation  Depression  YUE   * With improvement in her mentation, she has started becoming at times restless and agitated. On 8/27, reported having suicidal thoughts. No plans/attempts.  * initiated suicide precautions and sitter, evaluated by psychiatry service, discontinued.   - Reassess periodically for ongoing need  - Anti " depressant not recommended currently in the setting of delirium/encephalopathy.  - HS seroquel increased to 50 mg 8/31  - Seroquel 25 mg BID   - PRN Seroquel   - Continue Remeron to 15 mg  - Reconsulted psychiatry as patient continues to have issues with anxiety     C. difficile colitis  Patient was noted to have copious diarrhea on 8/1 and was noted positive for C. Difficile.  * Rectal tube came off 8/28 still with some diarrhea but not severe  - Continue oral vancomycin - till 9/22  - Continue questran from 4g daily to BID, loose stools are improving  - Continue probiotic  - Barrier cream for skin breakdown     Acute on chronic anemia  Prior labs show baseline hemoglobin of about 9-10.   * Hb on admission was 11.7, slowly trended down, got 1 unit PRBC on 8/8/22 for Hb 6.9  * Received IV iron infusion x3 with first on 8/11/22.  - Iron panel on 7/30/22 showed that her total iron was low at 14, binding capacity was low at 28, iron saturation was 6 and B12 was high and folate was normal  * No obvious ongoing bleeding noted; Hb has now remained stable around 9 to 10 on 8/26  - Continue to monitor hgb periodically     History of trigeminal neuralgia  - Continue PTA Trileptal , Keppra and Topamax     History of chronic pyloric ulcer  She had EGD done in 2020 which showed gastroduodenitis with a duodenal stricture.  - Continue PPI     Hyperglycemia, resolved  Recent HbA1c was 5.6 on 7/19/22. Was not on any medication prior to admission.  - Continue medium dose sliding scale insulin  - Requiring no supplemental insulin and no hypoglycemia, glucose checks q8h during the day and at 0200  - Decreased lantus to 8 units nightly 9/2  - Blood sugars well controlled      Intermittent Hypokalemia, Hypophosphatemia  - Replacement per protocol     Hypernatremia. Resolved  - Continue free water flushes 200 ml every 4 hrs     Severe dysphagia  Severe protein calorie malnutrition  - Nutrition following for tube feeds via G tube (placed  8/16) ; getting free water flushes.  - On calorie counts with DD6 diet     Physical deconditioning  - Will need TCU placement; declined by LTACH  - PT/OT signed off due to lack of patient participation.  Patient states she is now agreeable to working with them so re-consulted  - SW following for disposition.  Unfortunately due to the patient's lack of participation she has been declined by TCUs as well.  Now that she agreeable though hopefully they can re-assess.  Ultimately if patient continues to refuse and/or she is declined from all TCUs will need to discuss with daughter about discharge to home vs private pay for LTC             Diet: Combination Diet Soft and Bite Sized Diet (level 6); Thin Liquids (level 0) (pt prefers straws)  Snacks/Supplements Adult: Other; L: mota Magic Cup, PRN sup w/ dinner; With Meals  Adult Formula Drip Feeding: Continuous Vital 1.5; Gastrostomy; Goal Rate: 60 mL/hr x 12 hrs (8pm-8am); mL/hr; Medication - Feeding Tube Flush Frequency: At least 15-30 mL water before and after medication administration and with tube clogging; Estella...    DVT Prophylaxis: Pneumatic Compression Devices  Abrams Catheter: Not present  Central Lines: None  Cardiac Monitoring: None  Code Status: Full Code      Disposition Plan      Expected Discharge Date: 09/21/2022    Discharge Delays: Insurance Authorization needed  Placement - TCU  Destination: inpatient rehabilitation facility  Discharge Comments: TCU pending, they may decline. Difficult placement        The patient's care was discussed with the Care Coordinator/ and Patient.    Diego Prieto, DO  Hospitalist Service  [facility]  Securely message with the [employer] Web Console (learn more here)  Text page via [employer] Paging/Directory         Clinically Significant Risk Factors Present on Admission                      ______________________________________________________________________    Interval History   Patient  seen and examined.  No acute events over night.  No fevers noted.  Pain better controlled today.  No difficulty breathing.  Tolerating diet without nausea or vomiting    Data reviewed today: I reviewed all medications, new labs and imaging results over the last 24 hours. I personally reviewed no images or EKG's today.    Physical Exam   Vital Signs: Temp: 98.2  F (36.8  C) Temp src: Axillary BP: 123/66 Pulse: 82   Resp: 16 SpO2: 98 % O2 Device: None (Room air)    Weight: 127 lbs 13.87 oz  General Appearance: Resting comfortably. NAD   Respiratory: Clear to auscultation.  No respiratory distress  Cardiovascular: RRR.  Appears well perfused   GI: Soft.  Non-distended  Skin: No obvious rashes or cyanosis to exposed skin  Other: Alert.  No edema noted      Data   Recent Labs   Lab 09/20/22  1219 09/20/22  0735 09/19/22  2232 09/19/22  0850 09/19/22  0814 09/18/22  0840 09/18/22  0828 09/16/22  1156 09/16/22  0846   WBC  --   --   --   --   --   --   --   --  9.0   HGB  --   --   --   --   --   --   --   --  10.7*   MCV  --   --   --   --   --   --   --   --  82   PLT  --   --   --   --   --   --   --   --  546*   NA  --   --   --   --  136  --   --   --  134   POTASSIUM  --  4.1  --   --  4.1  --  4.0   < > 4.1   CHLORIDE  --   --   --   --  103  --   --   --  102   CO2  --   --   --   --  27  --   --   --  26   BUN  --   --   --   --  16  --   --   --  19   CR  --   --   --   --  0.50*  --   --   --  0.49*   ANIONGAP  --   --   --   --  6  --   --   --  6   ADY  --   --   --   --  9.2  --   --   --  9.2   * 126* 119*   < > 109*   < >  --    < > 117*    < > = values in this interval not displayed.     No results found for this or any previous visit (from the past 24 hour(s)).

## 2022-09-20 NOTE — PROGRESS NOTES
"SPIRITUAL HEALTH SERVICES Progress Note     SH  73    I visited with the pt at bedside. Pt expressed openness to SH visits, but was very exhausted during this encounter. Pt noted that they had been \"thinking about a lot of things lately\" but did not elaborate when prompted. Pt expressed no SH needs at this moment but would appreciate a follow-up visit. I explained SH availability to the pt and will follow-up while on unit 73.     ------  Malik Suárez M.Div.  Resident   Pager: [phone]   "

## 2022-09-20 NOTE — PROGRESS NOTES
/Care Management Follow Up    Length of Stay (days): 55    Expected Discharge Date: 09/21/2022     Concerns to be Addressed:       Patient plan of care discussed at interdisciplinary rounds: Yes    Anticipated Discharge Disposition: TCU     Education Provided on the Discharge Plan:  SW  Returned call to  BC/BS , Nicolle to provide update.  Patient/Family in Agreement with the Plan: yes    Referrals Placed by CM/SW: Post Acute Facilities (Submit for insurance authorization)  Private pay costs discussed:     Additional Information:  Hospitalist spoke with pt yesterday and reordered therapies to work with pt.  Pt is participating. SW to send referrals to TCU's assessment.    WILEY King  [facility]  Care Transitions  [phone]

## 2022-09-20 NOTE — PLAN OF CARE
Pt here with cerebral abscess. Alert and oriented. Neuros intact. VSS. IDDSI level soft, bite sized diet with thin liquids. Takes pills whole or via g tube. TF from 8465-9935. PO intake poor. Up with Ax2, GB, walker. Incontinent of bowel and bladder, declines using pure wick. Reporting back pain, using Lidocaine patch, prn Tylenol and Tramadol. Pt scoring green on the Aggression Stop Light Tool. Plan discharge to TCU pending placement.

## 2022-09-20 NOTE — PROGRESS NOTES
09/20/22 0910   Quick Adds   Type of Visit PT Re-evaluation   Living Environment   People in Home grandchild(geetha)   Current Living Arrangements house   Transportation Anticipated family or friend will provide   Self-Care   Usual Activity Tolerance fair   Current Activity Tolerance poor   Equipment Currently Used at Home grab bar, tub/shower   Activity/Exercise/Self-Care Comment Per chart: Pt with low activity level prior to hospitalization. Pt states she stays at home and does not leave much. Has never driven. Grandson gets groceries. Pt does state that she was indep with self-cares and funcitonal mobility. Does own cooking and cleaning.   General Information   Onset of Illness/Injury or Date of Surgery 09/27/22   Referring Physician Diego Prieto, DO   Patient/Family Therapy Goals Statement (PT) To reposition, to have a bowel movement.   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   Existing Precautions/Restrictions fall   Cognition   Affect/Mental Status (Cognition) anxious   Follows Commands (Cognition) follows one-step commands   Behavioral Issues overwhelmed easily   Safety Deficit (Cognition) at risk behavior observed;judgment;problem-solving;safety precautions awareness;safety precautions follow-through/compliance;impulsivity   Cognitive Status Comments When nervous will sit pre-emptively.   Pain Assessment   Patient Currently in Pain   (During transition for sit-stand only.)   Posture    Posture Comments Impaired stability, controlled mobility.   Range of Motion (ROM)   ROM Comment DF neutral B with knee ext   Strength Comprehensive (MMT)   Comment, General Manual Muscle Testing (MMT) Assessment SLR full B. Weak wtih UE assist to power to stand, rapid fatigue wtih SOB.   Bed Mobility    Comment, (Bed Mobility) SBA with HOB up mildly, rail.   Transfers   Comment, (Transfers) [place] stand, posterior LOB, heavy UE dependence.   Gait/Stairs (Locomotion)   Distance in Feet (Required for LE Total Joints) Unsteady with LOB needing PT intervention for falls prevention.   Balance   Balance Comments High falls risk: LOBs needing AD and Ax1, weak, impulsive when fear-avoidant.   Coordination   Coordination Comments WFL   Muscle Tone   Muscle Tone Comments WFL   Clinical Impression   Criteria for Skilled Therapeutic Intervention Yes, treatment indicated   PT Diagnosis (PT) Impaired mobility   Influenced by the following impairments Impaired strength, balance, activity tolerance, safe decision-making, self-efficacy.   Functional limitations due to impairments Impaired independent mobility & living   Clinical Presentation (PT Evaluation Complexity) Stable/Uncomplicated   Clinical Decision Making (Complexity) low complexity   Planned Therapy Interventions (PT) balance training;bed mobility training;gait training;home exercise program;neuromuscular re-education;patient/family education;postural re-education;stair training;strengthening;stretching;ROM (range of motion);transfer training;progressive activity/exercise;risk factor education;home program guidelines   Anticipated Equipment Needs at Discharge (PT) commode chair;gait belt;walker, rolling;wheelchair   Risk & Benefits of therapy have been explained evaluation/treatment results reviewed;care plan/treatment goals reviewed;risks/benefits reviewed;participants included;patient;participants voiced agreement with care plan;current/potential barriers reviewed   PT Discharge Planning   PT Discharge Recommendation (DC Rec) Transitional Care Facility   PT Rationale for DC Rec Pt participated in PT with Ax1 and RW, unsteady & deconditioned (below Ni baseline). TCU warranted.   PT Brief overview of current status Bed mob SBA, tx & 3' + 5' RW Latosha posterior LOBs, mild  SOB, impulsive when fear-avoidant.   Total Evaluation Time   Total Evaluation Time (Minutes) 5   Physical Therapy Goals   PT Frequency 5x/week   PT Predicted Duration/Target Date for Goal Attainment 09/26/22   PT Goals Bed Mobility;Transfers;Stairs;Gait;PT Goal 1   PT: Bed Mobility Modified independent;Supine to/from sit;Rolling   PT: Transfers Supervision/stand-by assist;Bed to/from chair;Sit to/from stand;Assistive device   PT: Gait Supervision/stand-by assist;Rolling walker;100 feet   PT: Stairs Minimal assist;4 stairs;Rail on both sides

## 2022-09-20 NOTE — PLAN OF CARE
Goal Outcome Evaluation:    A&Ox 4, intermittently anxious/agitated. VSS on RA. C/o abd pain, relieved with flatus, and c/o headache relieved with tylenol x2, tramadol x1. Not OOB this shift, turns self at times otherwise T/Rq2hr. Soft/bite, thin liquid diet, swallows pills whole. PEG tube with TF 8p-8a and 200cc flushes q4hr. Incontinent of urine, purewick in use overnight. No BM this shift. Skin intact ex redness to perineum/sacrum, barrier cream applied. LS clear/diminished, shallow. BS active, denies nausea. PIV SL. Discharge pending placement and ability to participate in therapies.

## 2022-09-21 ENCOUNTER — APPOINTMENT (OUTPATIENT)
Dept: PHYSICAL THERAPY | Facility: CLINIC | Age: 77
DRG: 023 | End: 2022-09-21
Attending: INTERNAL MEDICINE
Payer: COMMERCIAL

## 2022-09-21 ENCOUNTER — APPOINTMENT (OUTPATIENT)
Dept: OCCUPATIONAL THERAPY | Facility: CLINIC | Age: 77
DRG: 023 | End: 2022-09-21
Attending: INTERNAL MEDICINE
Payer: COMMERCIAL

## 2022-09-21 LAB
GLUCOSE BLDC GLUCOMTR-MCNC: 100 MG/DL (ref 70–99)
GLUCOSE BLDC GLUCOMTR-MCNC: 130 MG/DL (ref 70–99)
GLUCOSE BLDC GLUCOMTR-MCNC: 135 MG/DL (ref 70–99)
GLUCOSE BLDC GLUCOMTR-MCNC: 95 MG/DL (ref 70–99)
PHOSPHATE SERPL-MCNC: 4.2 MG/DL (ref 2.5–4.5)
POTASSIUM BLD-SCNC: 4.2 MMOL/L (ref 3.4–5.3)

## 2022-09-21 PROCEDURE — 250N000013 HC RX MED GY IP 250 OP 250 PS 637: Performed by: HOSPITALIST

## 2022-09-21 PROCEDURE — 250N000013 HC RX MED GY IP 250 OP 250 PS 637: Performed by: INTERNAL MEDICINE

## 2022-09-21 PROCEDURE — 84100 ASSAY OF PHOSPHORUS: CPT | Performed by: INTERNAL MEDICINE

## 2022-09-21 PROCEDURE — 36415 COLL VENOUS BLD VENIPUNCTURE: CPT | Performed by: INTERNAL MEDICINE

## 2022-09-21 PROCEDURE — 250N000013 HC RX MED GY IP 250 OP 250 PS 637

## 2022-09-21 PROCEDURE — 99232 SBSQ HOSP IP/OBS MODERATE 35: CPT | Performed by: INTERNAL MEDICINE

## 2022-09-21 PROCEDURE — 97535 SELF CARE MNGMENT TRAINING: CPT | Mod: GO

## 2022-09-21 PROCEDURE — 97530 THERAPEUTIC ACTIVITIES: CPT | Mod: GP

## 2022-09-21 PROCEDURE — 97116 GAIT TRAINING THERAPY: CPT | Mod: GP

## 2022-09-21 PROCEDURE — 120N000001 HC R&B MED SURG/OB

## 2022-09-21 PROCEDURE — 84132 ASSAY OF SERUM POTASSIUM: CPT | Performed by: INTERNAL MEDICINE

## 2022-09-21 RX ADMIN — CHOLESTYRAMINE 4 G: 4 POWDER, FOR SUSPENSION ORAL at 09:17

## 2022-09-21 RX ADMIN — Medication 15 ML: at 09:18

## 2022-09-21 RX ADMIN — TRAMADOL HYDROCHLORIDE 25 MG: 50 TABLET ORAL at 13:41

## 2022-09-21 RX ADMIN — TRAMADOL HYDROCHLORIDE 25 MG: 50 TABLET ORAL at 22:00

## 2022-09-21 RX ADMIN — Medication 250 MG: at 20:12

## 2022-09-21 RX ADMIN — Medication 1 PACKET: at 09:17

## 2022-09-21 RX ADMIN — CHOLESTYRAMINE 4 G: 4 POWDER, FOR SUSPENSION ORAL at 20:14

## 2022-09-21 RX ADMIN — LEVETIRACETAM 1000 MG: 100 SOLUTION ORAL at 20:14

## 2022-09-21 RX ADMIN — Medication 1 PACKET: at 13:42

## 2022-09-21 RX ADMIN — VANCOMYCIN HYDROCHLORIDE 125 MG: KIT at 09:17

## 2022-09-21 RX ADMIN — ACETAMINOPHEN 650 MG: 325 TABLET, FILM COATED ORAL at 09:50

## 2022-09-21 RX ADMIN — ACETAMINOPHEN 650 MG: 325 TABLET, FILM COATED ORAL at 15:07

## 2022-09-21 RX ADMIN — INSULIN GLARGINE 8 UNITS: 100 INJECTION, SOLUTION SUBCUTANEOUS at 22:01

## 2022-09-21 RX ADMIN — QUETIAPINE FUMARATE 25 MG: 25 TABLET ORAL at 09:17

## 2022-09-21 RX ADMIN — LIDOCAINE 1 PATCH: 560 PATCH PERCUTANEOUS; TOPICAL; TRANSDERMAL at 09:17

## 2022-09-21 RX ADMIN — QUETIAPINE FUMARATE 25 MG: 25 TABLET ORAL at 16:53

## 2022-09-21 RX ADMIN — VANCOMYCIN HYDROCHLORIDE 125 MG: KIT at 22:00

## 2022-09-21 RX ADMIN — LEVETIRACETAM 1000 MG: 100 SOLUTION ORAL at 09:17

## 2022-09-21 RX ADMIN — VANCOMYCIN HYDROCHLORIDE 125 MG: KIT at 18:31

## 2022-09-21 RX ADMIN — ACETAMINOPHEN 650 MG: 325 TABLET, FILM COATED ORAL at 20:13

## 2022-09-21 RX ADMIN — Medication 40 MG: at 09:18

## 2022-09-21 RX ADMIN — MICONAZOLE NITRATE: 20 CREAM TOPICAL at 09:19

## 2022-09-21 RX ADMIN — Medication 1 PACKET: at 18:32

## 2022-09-21 RX ADMIN — QUETIAPINE 12.5 MG: 25 TABLET, FILM COATED ORAL at 15:07

## 2022-09-21 RX ADMIN — MICONAZOLE NITRATE: 20 CREAM TOPICAL at 20:14

## 2022-09-21 RX ADMIN — TRAMADOL HYDROCHLORIDE 25 MG: 50 TABLET ORAL at 05:34

## 2022-09-21 RX ADMIN — VANCOMYCIN HYDROCHLORIDE 125 MG: KIT at 13:42

## 2022-09-21 RX ADMIN — Medication 250 MG: at 09:17

## 2022-09-21 RX ADMIN — TOPIRAMATE 50 MG: 50 TABLET ORAL at 22:00

## 2022-09-21 RX ADMIN — OXCARBAZEPINE 450 MG: 300 SUSPENSION ORAL at 22:00

## 2022-09-21 RX ADMIN — MIRTAZAPINE 15 MG: 15 TABLET, FILM COATED ORAL at 22:00

## 2022-09-21 RX ADMIN — QUETIAPINE FUMARATE 50 MG: 50 TABLET ORAL at 22:00

## 2022-09-21 ASSESSMENT — ACTIVITIES OF DAILY LIVING (ADL)
ADLS_ACUITY_SCORE: 45
ADLS_ACUITY_SCORE: 41
ADLS_ACUITY_SCORE: 41
ADLS_ACUITY_SCORE: 45
ADLS_ACUITY_SCORE: 41

## 2022-09-21 NOTE — PLAN OF CARE
No acute changes this shift. A&O x4, Neuros unchanged. VSS. Soft bite sized diet, thin liquids. Takes pills whole. PEG tube in place, TF started 8pm as ordered, due to be stopped at 8am. Running at 60ml/hr with Q4 200 ml FWF. Incontinent of B&B. Up with A2/lift. Discharge pending TCU.

## 2022-09-21 NOTE — PROGRESS NOTES
Infection Prevention Note:  Patient Name: Julisa Chaney   MRN: [id number]   Admit Date: 7/27/2022    Current Infection Status: MRSA, Recovered COVID, C-difficile   Current Isolation Status: Contact, Enteric     Patient tested positive for C-difficile on 8/1/22.  Patient is currently being treated with PO vanco and last documented loose stool was on 9/16/22.     For the patient to come out of enteric isolation:  Inpatients with prolonged hospitalization whose last positive test was more than 30 days prior may be removed from enteric Transmission-based precautions if they meet the below requirements.   a. 1 week off antibiotics   b. No diarrhea for > 1 week   c. Completed a primary course of treatment for at least 2 weeks   d. With approval from Infection Prevention: Patient must be moved into a new private room and old room will need to be terminally cleaned with bleach. The patient must be placed in a clean gown, have clean linen and placed in a clean bed.    Seema Vázquez, Infection Prevention on 9/21/2022 at 11:27 AM

## 2022-09-21 NOTE — PROGRESS NOTES
Care Management Follow Up    Length of Stay (days): 56    Expected Discharge Date: 09/22/2022     Concerns to be Addressed:       Patient plan of care discussed at interdisciplinary rounds: Yes    Anticipated Discharge Disposition: LTACH     Anticipated Discharge Services:    Anticipated Discharge DME:      Patient/family educated on Medicare website which has current facility and service quality ratings:    Education Provided on the Discharge Plan:    Patient/Family in Agreement with the Plan: yes    Referrals Placed by CM/SW: Post Acute Facilities (Submit for insurance authorization)  Private pay costs discussed: Not applicable    Additional Information:  Writer placed call to outstanding referrals for TCU bed availability -   [facility] - no bed availability.  [facility] - left vm for admissions  [facility] - resent referral and left vm for admissions  [facility]CU - resent referral. No bed availability at this time  [facility] - no bed availability this week  [facility]  - no bed availability this week. Admissions requested for fresh referral to be sent next Monday/Tuesday    Will continue to follow for TCU bed. Sent referral to [facility] TCU.    ARLEN Bellamy, SW    [facility]

## 2022-09-21 NOTE — PROGRESS NOTES
[facility]    Medicine Progress Note - Hospitalist Service    Date of Admission:  7/27/2022    Assessment & Plan        Julisa Chaney is a 77 year old female admitted on 7/27/2022.  PMH of trigeminal neuralgia who was recently admitted to [facility] on 7/19 for severe sepsis due to COVID-19 infection and suspected bacterial pneumonia. She was treated with 5 days of remdesivir and did not require any steroids, also treated with IV vancomycin and IV meropenem.     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  She was switched to IV vancomycin, cefepime and metronidazole and transferred to [facility] for neurosurgery assessment. MRI brain w/wo contrast 7/30 showed ventriculitis with probable abscess. Neurosurgery/neurology/infectious disease/oncology consulted. Patient taken to the OR on 7/31 for left frontal ventriculostomy.   Since then has remained on broad spectrum antibiotics.  In addition she has developed C. Difficile colitis and is on treatment.  She has continued to have a significant encephalopathy which is very slowly improving.     Severe sepsis secondary to ventriculitis with left lateral ventricle abscess. Resolved  S/p left frontal ventriculostomy 7/31/22 with subsequent EVD removal after 8 days  * Appreciate neurosurgery, oncology, ID assistance.  * flow cytometry did not show any evidence of malignancy.  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 The left temporal horn remains slightly dilated but no samuel hydrocephalus. No acute changes.  - Remains afebrile; leukocytosis resolved ; was initially on vancomycin, cefepime, and metronidazole, then Meropenem and Vancomycin; ID signed off, completed 6 weeks treatment until 9/8/22.  - Trying to avoid any narcotics with her confusion/ encephalopathy.  - Progress has been slow and fairly minimal. Concern about overall prognosis. Briefly discussed palliative care with patient's daughter on 9/6/22. She requested re-evaluation from psychiatry regarding depression (completed 9/8/22, see consult note). Seems to want to see how she does with the medication prior to having any discussion about palliative care     Acute infectious encephalopathy due to above.  Resolved   Chronic pain, likely exacerbated by immobility  * mentation much improved since admission ; fluctuating at times ; will not engage in conversation most of the times.  * 8/23 discontinued prn benadryl, narcotics; minimize narcotic.  - Continue to treat underlying issues as noted above  - Tylenol PRN   - Continue Lidoderm patches for back  - Maintain normal sleep/wake cycle as able  - Use minimal single dosing of narcotic if absolutely needed for pain, no standing order   - Decreased PRN ultram to 25 mg q8h as patient reports concerns with \"being foggy\"    Suicidal throughs/agitation  Depression  YUE   * With improvement in her mentation, she has started becoming at times restless and agitated. On 8/27, reported having suicidal thoughts. No plans/attempts.  * initiated suicide precautions and sitter, evaluated by psychiatry service, discontinued.   - Reassess periodically for ongoing need  - Anti depressant " not recommended currently in the setting of delirium/encephalopathy.  - HS seroquel increased to 50 mg 8/31  - Seroquel 25 mg BID   - PRN Seroquel   - Continue Remeron to 15 mg  - Reconsulted psychiatry as patient continues to have issues with anxiety     C. difficile colitis  Patient was noted to have copious diarrhea on 8/1 and was noted positive for C. Difficile.  * Rectal tube came off 8/28 still with some diarrhea but not severe  - Continue oral vancomycin - till 9/22  - Continue questran from 4g daily to BID, loose stools are improving  - Continue probiotic  - Barrier cream for skin breakdown     Acute on chronic anemia  Prior labs show baseline hemoglobin of about 9-10.   * Hb on admission was 11.7, slowly trended down, got 1 unit PRBC on 8/8/22 for Hb 6.9  * Received IV iron infusion x3 with first on 8/11/22.  - Iron panel on 7/30/22 showed that her total iron was low at 14, binding capacity was low at 28, iron saturation was 6 and B12 was high and folate was normal  * No obvious ongoing bleeding noted; Hb has now remained stable around 9 to 10 on 8/26  - Continue to monitor hgb periodically     History of trigeminal neuralgia  - Continue PTA Trileptal , Keppra and Topamax     History of chronic pyloric ulcer  She had EGD done in 2020 which showed gastroduodenitis with a duodenal stricture.  - Continue PPI     Hyperglycemia, resolved  Recent HbA1c was 5.6 on 7/19/22. Was not on any medication prior to admission.  - Continue medium dose sliding scale insulin  - Requiring no supplemental insulin and no hypoglycemia, glucose checks q8h during the day and at 0200  - Decreased lantus to 8 units nightly 9/2  - Blood sugars well controlled      Intermittent Hypokalemia, Hypophosphatemia  - Replacement per protocol     Hypernatremia. Resolved  - Continue free water flushes 200 ml every 4 hrs     Severe dysphagia  Severe protein calorie malnutrition  - Nutrition following for tube feeds via G tube (placed 8/16) ;  getting free water flushes.  - On calorie counts with DD6 diet     Physical deconditioning  - Will need TCU placement; declined by LTACH  - PT/OT re-consulted as patient now agreeable.  Patient has been cooperative with them and they are recommending TCU   - SW following for disposition.  Resent referrals as patient now participating with therapy            Diet: Combination Diet Soft and Bite Sized Diet (level 6); Thin Liquids (level 0) (pt prefers straws)  Snacks/Supplements Adult: Other; L: mota Magic Cup, PRN sup w/ dinner; With Meals  Adult Formula Drip Feeding: Continuous Vital 1.5; Gastrostomy; Goal Rate: 60 mL/hr x 12 hrs (8pm-8am); mL/hr; Medication - Feeding Tube Flush Frequency: At least 15-30 mL water before and after medication administration and with tube clogging; Tranquillity...    DVT Prophylaxis: DOAC   Abrams Catheter: Not present  Central Lines: None  Cardiac Monitoring: None  Code Status: Full Code      Disposition Plan      Expected Discharge Date: 09/22/2022    Discharge Delays: Insurance Authorization needed  Placement - TCU  Destination: inpatient rehabilitation facility  Discharge Comments: TCU pending, they may decline. Difficult placement        The patient's care was discussed with the Bedside Nurse and Care Coordinator/.    Diego Prieto, DO  Hospitalist Service  [facility]  Securely message with the Vocera Web Console (learn more here)  Text page via [employer] Paging/Directory         Clinically Significant Risk Factors Present on Admission                      ______________________________________________________________________    Interval History   Patient seen and examined.  No acute events over night.  No reports of fevers.  Tolerating diet.  Still working with therapy.  No vomiting noted.  No hypoxia.     Data reviewed today: I reviewed all medications, new labs and imaging results over the last 24 hours. I personally reviewed no images or EKG's  today.    Physical Exam   Vital Signs: Temp: 97.8  F (36.6  C) Temp src: Axillary BP: 117/50 Pulse: 72   Resp: 16 SpO2: 98 % O2 Device: None (Room air)    Weight: 127 lbs 13.87 oz  General Appearance: Sleeping comfortably in bed.  NAD   Respiratory: No respiratory distress  Cardiovascular: RRR.  No obvious murmurs  GI: Soft.  Non-distended  Skin: No obvious rashes or cyanosis to exposed skin   Other: Sleeping and minimally arousable to verbal stimuli.  No obvious edema      Data   Recent Labs   Lab 09/21/22  0903 09/21/22  0752 09/21/22  0019 09/20/22  2120 09/20/22  1219 09/20/22  0735 09/19/22  0850 09/19/22  0814 09/16/22  1156 09/16/22  0846   WBC  --   --   --   --   --   --   --   --   --  9.0   HGB  --   --   --   --   --   --   --   --   --  10.7*   MCV  --   --   --   --   --   --   --   --   --  82   PLT  --   --   --   --   --   --   --   --   --  546*   NA  --   --   --   --   --   --   --  136  --  134   POTASSIUM 4.2  --   --   --   --  4.1  --  4.1   < > 4.1   CHLORIDE  --   --   --   --   --   --   --  103  --  102   CO2  --   --   --   --   --   --   --  27  --  26   BUN  --   --   --   --   --   --   --  16  --  19   CR  --   --   --   --   --   --   --  0.50*  --  0.49*   ANIONGAP  --   --   --   --   --   --   --  6  --  6   ADY  --   --   --   --   --   --   --  9.2  --  9.2   GLC  --  135* 130* 113*   < > 126*   < > 109*   < > 117*    < > = values in this interval not displayed.     No results found for this or any previous visit (from the past 24 hour(s)).

## 2022-09-21 NOTE — PLAN OF CARE
Goal Outcome Evaluation:      Pt here with Brain mass. A&O x4. Neuros weakness, on PEG to be infusing from 8pm to 8am (see orders for rate and free water flushes)  VSS. Not on Tele. Soft bite size diet, thin liquids. Takes pills whole with water with no swallowing issues(Tylenol, Seroquel, and Tramadol), other meds given through PEG. Up with 2 and lift, bed rest most of the time. Constant chronic headache, PRN Tramadol and Tylenol given. Pt scoring green on the Aggression Stop Light Tool. Plan to Discharge to a TCU, awaiting placement.

## 2022-09-22 ENCOUNTER — APPOINTMENT (OUTPATIENT)
Dept: SPEECH THERAPY | Facility: CLINIC | Age: 77
DRG: 023 | End: 2022-09-22
Attending: INTERNAL MEDICINE
Payer: COMMERCIAL

## 2022-09-22 ENCOUNTER — APPOINTMENT (OUTPATIENT)
Dept: PHYSICAL THERAPY | Facility: CLINIC | Age: 77
DRG: 023 | End: 2022-09-22
Attending: INTERNAL MEDICINE
Payer: COMMERCIAL

## 2022-09-22 ENCOUNTER — APPOINTMENT (OUTPATIENT)
Dept: OCCUPATIONAL THERAPY | Facility: CLINIC | Age: 77
DRG: 023 | End: 2022-09-22
Attending: INTERNAL MEDICINE
Payer: COMMERCIAL

## 2022-09-22 LAB
ALBUMIN SERPL-MCNC: 2.8 G/DL (ref 3.4–5)
ALP SERPL-CCNC: 144 U/L (ref 40–150)
ALT SERPL W P-5'-P-CCNC: 24 U/L (ref 0–50)
ANION GAP SERPL CALCULATED.3IONS-SCNC: 6 MMOL/L (ref 3–14)
AST SERPL W P-5'-P-CCNC: 17 U/L (ref 0–45)
BILIRUB SERPL-MCNC: 0.2 MG/DL (ref 0.2–1.3)
BUN SERPL-MCNC: 10 MG/DL (ref 7–30)
CALCIUM SERPL-MCNC: 9.1 MG/DL (ref 8.5–10.1)
CHLORIDE BLD-SCNC: 101 MMOL/L (ref 94–109)
CO2 SERPL-SCNC: 25 MMOL/L (ref 20–32)
CREAT SERPL-MCNC: 0.43 MG/DL (ref 0.52–1.04)
GFR SERPL CREATININE-BSD FRML MDRD: >90 ML/MIN/1.73M2
GLUCOSE BLD-MCNC: 105 MG/DL (ref 70–99)
GLUCOSE BLDC GLUCOMTR-MCNC: 134 MG/DL (ref 70–99)
GLUCOSE BLDC GLUCOMTR-MCNC: 93 MG/DL (ref 70–99)
GLUCOSE BLDC GLUCOMTR-MCNC: 99 MG/DL (ref 70–99)
PHOSPHATE SERPL-MCNC: 4 MG/DL (ref 2.5–4.5)
POTASSIUM BLD-SCNC: 4.1 MMOL/L (ref 3.4–5.3)
POTASSIUM BLD-SCNC: 4.1 MMOL/L (ref 3.4–5.3)
PROT SERPL-MCNC: 6.6 G/DL (ref 6.8–8.8)
SODIUM SERPL-SCNC: 132 MMOL/L (ref 133–144)

## 2022-09-22 PROCEDURE — 84100 ASSAY OF PHOSPHORUS: CPT | Performed by: INTERNAL MEDICINE

## 2022-09-22 PROCEDURE — 250N000013 HC RX MED GY IP 250 OP 250 PS 637: Performed by: HOSPITALIST

## 2022-09-22 PROCEDURE — 97535 SELF CARE MNGMENT TRAINING: CPT | Mod: GO

## 2022-09-22 PROCEDURE — 97530 THERAPEUTIC ACTIVITIES: CPT | Mod: GO

## 2022-09-22 PROCEDURE — 84132 ASSAY OF SERUM POTASSIUM: CPT | Performed by: INTERNAL MEDICINE

## 2022-09-22 PROCEDURE — 36415 COLL VENOUS BLD VENIPUNCTURE: CPT | Performed by: INTERNAL MEDICINE

## 2022-09-22 PROCEDURE — 250N000013 HC RX MED GY IP 250 OP 250 PS 637: Performed by: INTERNAL MEDICINE

## 2022-09-22 PROCEDURE — 97530 THERAPEUTIC ACTIVITIES: CPT | Mod: GP

## 2022-09-22 PROCEDURE — 250N000013 HC RX MED GY IP 250 OP 250 PS 637

## 2022-09-22 PROCEDURE — 99232 SBSQ HOSP IP/OBS MODERATE 35: CPT | Performed by: HOSPITALIST

## 2022-09-22 PROCEDURE — 80053 COMPREHEN METABOLIC PANEL: CPT | Performed by: HOSPITALIST

## 2022-09-22 PROCEDURE — 120N000001 HC R&B MED SURG/OB

## 2022-09-22 PROCEDURE — 92526 ORAL FUNCTION THERAPY: CPT | Mod: GN | Performed by: SPEECH-LANGUAGE PATHOLOGIST

## 2022-09-22 RX ADMIN — LIDOCAINE 1 PATCH: 560 PATCH PERCUTANEOUS; TOPICAL; TRANSDERMAL at 10:30

## 2022-09-22 RX ADMIN — MICONAZOLE NITRATE: 20 CREAM TOPICAL at 11:22

## 2022-09-22 RX ADMIN — CHOLESTYRAMINE 4 G: 4 POWDER, FOR SUSPENSION ORAL at 10:27

## 2022-09-22 RX ADMIN — VANCOMYCIN HYDROCHLORIDE 125 MG: KIT at 13:25

## 2022-09-22 RX ADMIN — VANCOMYCIN HYDROCHLORIDE 125 MG: KIT at 10:28

## 2022-09-22 RX ADMIN — LEVETIRACETAM 1000 MG: 100 SOLUTION ORAL at 10:29

## 2022-09-22 RX ADMIN — Medication 1 PACKET: at 11:20

## 2022-09-22 RX ADMIN — QUETIAPINE FUMARATE 50 MG: 50 TABLET ORAL at 21:29

## 2022-09-22 RX ADMIN — Medication 40 MG: at 10:28

## 2022-09-22 RX ADMIN — LEVETIRACETAM 1000 MG: 100 SOLUTION ORAL at 21:30

## 2022-09-22 RX ADMIN — MICONAZOLE NITRATE: 20 CREAM TOPICAL at 21:30

## 2022-09-22 RX ADMIN — CHOLESTYRAMINE 4 G: 4 POWDER, FOR SUSPENSION ORAL at 21:28

## 2022-09-22 RX ADMIN — MIRTAZAPINE 15 MG: 15 TABLET, FILM COATED ORAL at 21:29

## 2022-09-22 RX ADMIN — TRAMADOL HYDROCHLORIDE 25 MG: 50 TABLET ORAL at 15:01

## 2022-09-22 RX ADMIN — VANCOMYCIN HYDROCHLORIDE 125 MG: KIT at 19:45

## 2022-09-22 RX ADMIN — ACETAMINOPHEN 650 MG: 325 TABLET, FILM COATED ORAL at 21:28

## 2022-09-22 RX ADMIN — ACETAMINOPHEN 650 MG: 325 TABLET, FILM COATED ORAL at 15:00

## 2022-09-22 RX ADMIN — Medication 3 MG: at 21:28

## 2022-09-22 RX ADMIN — Medication 250 MG: at 21:29

## 2022-09-22 RX ADMIN — Medication 1 PACKET: at 13:25

## 2022-09-22 RX ADMIN — TRAMADOL HYDROCHLORIDE 25 MG: 50 TABLET ORAL at 06:16

## 2022-09-22 RX ADMIN — QUETIAPINE FUMARATE 25 MG: 25 TABLET ORAL at 16:34

## 2022-09-22 RX ADMIN — Medication 1 PACKET: at 19:48

## 2022-09-22 RX ADMIN — ACETAMINOPHEN 650 MG: 325 TABLET, FILM COATED ORAL at 10:23

## 2022-09-22 RX ADMIN — INSULIN GLARGINE 8 UNITS: 100 INJECTION, SOLUTION SUBCUTANEOUS at 21:30

## 2022-09-22 RX ADMIN — QUETIAPINE FUMARATE 25 MG: 25 TABLET ORAL at 10:23

## 2022-09-22 RX ADMIN — Medication 250 MG: at 10:24

## 2022-09-22 RX ADMIN — TOPIRAMATE 50 MG: 50 TABLET ORAL at 21:29

## 2022-09-22 RX ADMIN — OXCARBAZEPINE 450 MG: 300 SUSPENSION ORAL at 21:30

## 2022-09-22 RX ADMIN — Medication 15 ML: at 11:21

## 2022-09-22 ASSESSMENT — ACTIVITIES OF DAILY LIVING (ADL)
ADLS_ACUITY_SCORE: 38
ADLS_ACUITY_SCORE: 45
ADLS_ACUITY_SCORE: 38
ADLS_ACUITY_SCORE: 39
ADLS_ACUITY_SCORE: 45
ADLS_ACUITY_SCORE: 45
ADLS_ACUITY_SCORE: 38
ADLS_ACUITY_SCORE: 45
ADLS_ACUITY_SCORE: 38
ADLS_ACUITY_SCORE: 45

## 2022-09-22 NOTE — PROGRESS NOTES
"CLINICAL NUTRITION SERVICES - REASSESSMENT NOTE      Future/Additional Recommendations:     Soft/Bite Sized (L6), Thin Liquids  Magic Cup with lunch meal    Recommend continue with current EN regimen       Malnutrition: (9/18)  % Weight Loss: > 10% in 6 months (severe malnutrition) - per 8/4 RD note  % Intake:  Does not meet criteria - pt meeting needs from EN + PO  Subcutaneous Fat Loss:  Orbital region moderate depletion - per 8/4 RD note  Muscle Loss:  Temporal region moderate depletion, Clavicle bone region moderate depletion, Acromion bone region moderate-severe depletion and Dorsal hand region severe depletion - per 8/4 RD note  Fluid Retention:  None noted     Malnutrition Diagnosis: Severe malnutrition  In Context of:  Acute on Chronic illness or disease       EVALUATION OF PROGRESS TOWARD GOALS   Diet:    Soft Bite Sized (L6), Thin Liquids  Lunch: berry magic cup    Nutrition Support:    Type of Feeding Tube: G-tube   Enteral Frequency:  Cyclic   Enteral Regimen: Vital 1.5 @ 60 mL/hr x 12 hrs (8pm-8am) + 1 packet Banatrol TID  Total Enteral Provisions: 1200 kcal (70% needs), 50 g protein (71% needs), 165 g CHO, 550 mL free water and 10 g fiber   Free Water Flush: 200 mL q 4 hrs     Liquids MVI/M    Intake/Tolerance:    Chart reviewed    Meds:  Questran BID  Florastor  Vanco - last dose today  Insulin     BM x1 past 24 hrs    9/22: Gluc 93    Per flowsheets, pt has been consuming ~25% of her meals (~400 cals/day, ~11 gm pro/day)  She is ordering 3 meals/day  Breakfast order today: eggs, blueberry muffin, jello, cran ayad    Visited with pt this morning  Tells me that she continues to have a decreased appetite and dislikes the hospital food  She is taking the magic cup, but \"it is soft by the time I get to it\"  Pt does not like other supplements  She does not drink milk or like yogurt  \"I do like the blueberry muffins\"        ASSESSED NUTRITION NEEDS:  Dosing Weight 48.3 kg (7/27 - admit wt)  Estimated Energy " Needs: 8804-5179 kcals (35-40 Kcal/Kg)  Justification: repletion and underweight  Estimated Protein Needs: 70-95 grams protein (1.5-2 g pro/Kg)  Justification: repletion       NEW FINDINGS:     TCU pending placement    09/20/22 0540 58 kg (127 lb 13.9 oz) Bed scale   09/14/22 0616 49 kg (108 lb) Bed scale   09/07/22 0500 50.1 kg (110 lb 7.2 oz) Bed scale   09/05/22 0552 50.3 kg (110 lb 14.3 oz) Bed scale   09/04/22 0618 50.6 kg (111 lb 8.8 oz)        08/03/22 0500 53.4 kg (117 lb 11.6 oz) Bed scale   08/02/22 0000 53.5 kg (117 lb 15.1 oz) Bed scale   08/01/22 0000 51.5 kg (113 lb 8.6 oz) Bed scale   07/31/22 0648 55.2 kg (121 lb 11.1 oz) Bed scale   07/30/22 0134 53.2 kg (117 lb 4.6 oz) Bed scale   07/28/22 0600 49.5 kg (109 lb 2 oz) Bed scale   07/28/22 0000 -- Bed scale   07/27/22 2100 48.3 kg (106 lb 7.7 oz)  ADMIT Bed scale         Previous Goals (9/18):   Patient will consume >/=60% estimated needs orally to justify wean of TF  Evaluation: Not met    EN + PO will meet % estimated needs   Evaluation: Met    Previous Nutrition Diagnosis (9/18):   Inadequate oral intake related to decreased appetite, suspected behavior impact and dysphagia and as evidenced by consuming <25% estimated needs orally with continued need for TF  Evaluation: No change, modified below          CURRENT NUTRITION DIAGNOSIS  Inadequate oral intake related to poor appetite and dislike of hospital food as evidenced by pt eating 25% meals and still requiring EN to meet nutrition needs    INTERVENTIONS  Recommendations / Nutrition Prescription  Soft/Bite Sized (L6), Thin Liquids  Magic Cup with lunch meal    Recommend continue with current EN regimen      Goals  EN + po intake to meet % estimated needs      MONITORING AND EVALUATION:  Progress towards goals will be monitored and evaluated per protocol and Practice Guidelines

## 2022-09-22 NOTE — PLAN OF CARE
Goal Outcome Evaluation:         Pt here with Brain mass. A&O x4. Neuros weakness, on PEG to be infusing from 8pm to 8am (see orders for rate and free water flushes)  VSS. Not on Tele. Soft bite size diet, thin liquids. Takes pills whole with water with no swallowing issues(Tylenol, Seroquel, and Tramadol), other meds given through PEG. Up with A1 GB W to commode, encourage commode use Q 2 hours, she is able to do it per PT, OT. Constant chronic headache, PRN Tramadol and Tylenol given. Tramadol is now Q12hrs PRN instead of Q8hrs PRN. Pt scoring green on the Aggression Stop Light Tool. Plan to Discharge to a TCU, awaiting placement.

## 2022-09-22 NOTE — PLAN OF CARE
Goal Outcome Evaluation:    Plan of Care Reviewed With: patient     Overall Patient Progress: no change    Outcome Evaluation: Diet: Soft/Bite Sized (L6), Thin Liquids + Magic Cup with lunch meal.  Pt still with decreased appetite - dislikes the food.  Tolerating night TF (Vital 1.5 @ 60 mL/hr x 12 hrs).    Recommend continue with current EN regimen at this time.

## 2022-09-22 NOTE — PLAN OF CARE
Reason for Admission: cerebral abscess    Cognitive/Mentation: A/Ox 4  Neuros/CMS: Intact ex generalized weakness  VS: Stable..  GI: BS +, + flatus, last BM today. Incontinent.  : Voiding adequately. Incontinent.  Pulmonary: LS clear.  Pain: Patient reports constant neck pain and migraine, improves with tylenol, tramadol, ice and hot packs.     Drains: None  Skin: PEG tube, rash to perineum  Activity: Assist x 2 with GBW.  Diet: Soft and bite-sized with thin liquids. Takes pills whole.     Aggression Stoplight Score: Green  Therapies recs: TCU  Discharge: Pending placement    End of shift summary: Enteric precautions maintained. Nocturnal tube feed running at goal, started at 2000 and to end at 0800 9/22. Patient turned and repositioned every 2 hours. Anti-fungal cream applied to rash on perineum.

## 2022-09-22 NOTE — PROGRESS NOTES
[facility]  Hospitalist Progress Note   09/22/2022          Assessment and Plan:       Julisa Chaney is a 77 year old female admitted on 7/27/2022.  PMH of trigeminal neuralgia who was recently admitted to [facility] on 7/19 for severe sepsis due to COVID-19 infection and suspected bacterial pneumonia. She was treated with 5 days of remdesivir and did not require any steroids, also treated with IV vancomycin and IV meropenem.     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  Switched to IV vancomycin, cefepime and metronidazole and transferred to [facility] for neurosurgery assessment. MRI brain 7/30 showed ventriculitis with probable abscess. Neurosurgery/neurology/infectious disease/oncology consulted. Underwent (7/31)left frontal ventriculostomy.   Since then has remained on broad spectrum antibiotics.  In addition she has developed C. Difficile colitis and is on treatment.  She has continued to have a significant encephalopathy which is very slowly improving.     Status post Severe sepsis secondary to ventriculitis with left lateral ventricle abscess. Resolved  S/p left frontal ventriculostomy 7/31/22 with subsequent EVD removal after 8 days  * Appreciate neurosurgery, oncology, ID assistance.  * Flow cytometry did not show any evidence of malignancy.  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 The left temporal horn remains slightly dilated but no samuel hydrocephalus. No acute changes.  - Remains afebrile; leukocytosis resolved ; was initially on vancomycin, cefepime, and metronidazole, then Meropenem and Vancomycin; ID signed off, completed 6 weeks treatment on 9/8/22.  - Trying to avoid any narcotics with her confusion/ encephalopathy.  - Progress has been slow and fairly minimal. Concern about overall prognosis. Briefly discussed palliative care with patient's daughter on 9/6/22. She requested re-evaluation from psychiatry regarding depression (completed 9/8/22, see consult note). Seems to want to see how she does with the medication prior to having any discussion about palliative care     Acute infectious encephalopathy due to above.  Resolved   Chronic pain, likely exacerbated by immobility  * Mentation much improved since admission ; fluctuating at times ; will not engage in conversation most of the times.  - Continue to treat underlying issues as noted above  - Tylenol PRN   - Continue Lidoderm patches for back  - Decreased PRN ultram to 25 mg q12h. Use minimal single dosing of narcotic if absolutely needed for pain, no standing order   - Maintain normal sleep/wake cycle as able    Suicidal throughs/agitation  Depression  YUE   * With improvement in her mentation, she has started becoming at times restless and agitated. On 8/27, reported having suicidal thoughts. No plans/attempts.  * initiated suicide precautions and sitter, evaluated by psychiatry service, discontinued.   - Reassess periodically for ongoing need  - Anti depressant not recommended currently in the setting of delirium/encephalopathy.  - HS seroquel increased to 50 mg 8/31  - Seroquel 25 mg BID and PRN Seroquel   - Continue Remeron to 15 mg  -  Reconsulted psychiatry prn continues to have issues with anxiety     C. difficile colitis  Patient was noted to have copious diarrhea on 8/1 and was noted positive for C. Difficile.  * Rectal tube came off 8/28 still with some diarrhea but not severe  - Continue oral vancomycin - till 9/22  - Continue questran from 4g daily to BID, loose stools are improving  - Continue probiotic  - Barrier cream for skin breakdown     Acute on chronic anemia  Prior labs show baseline hemoglobin of about 9-10.   * Hb on admission was 11.7, slowly trended down, got 1 unit PRBC on 8/8/22 for Hb 6.9  * Received IV iron infusion x3 with first on 8/11/22.  - Iron panel on 7/30/22 showed that her total iron was low at 14, binding capacity was low at 28, iron saturation was 6 and B12 was high and folate was normal  * No obvious ongoing bleeding noted; Hb has now remained stable around 9 to 10 on 8/26  - Continue to monitor hgb periodically     History of trigeminal neuralgia  Continue PTA Trileptal , Keppra and Topamax     Hyperglycemia, resolved  Recent HbA1c was 5.6 on 7/19/22. Was not on any medication prior to admission.  Medium intensity sliding scale insulin.  Continue Lantus 8 units at nighttime.  Monitor sugars, optimize regimen.    Intermittent Hypokalemia, Hypophosphatemia  Replacement per protocol     Hypernatremia. Resolved  Continue free water flushes 200 ml every 4 hrs     Severe dysphagia  Severe malnutrition in the setting of acute on chronic medical disease.  Nutrition following.  On tube feeding.  Soft diet.    Physical deconditioning  - Will need TCU placement; declined by LTACH  - PT/OT re-consulted as patient now agreeable.  Patient has been cooperative with them and they are recommending TCU   - SW following for disposition.  Resent referrals as patient now participating with therapy     Contact/enteric precautions.  History of MRSA, Recovered COVID, C-difficile     History of chronic pyloric ulcer  EGD 2020 which  showed gastroduodenitis with a duodenal stricture.  Continue PPI     Orders Placed This Encounter      Combination Diet Soft and Bite Sized Diet (level 6); Thin Liquids (level 0) (pt prefers straws)      DVT Prophylaxis:  DOAC   Code Status: Full Code  Disposition: Expected discharge pending safe discharge plan in place    Discussed with patient, bedside RN, .  Time greater than  30 minutes.  Extensive chart review, have taken over patient's care 57 of hospitalization.    More than 70% of time spent in direct patient care, care coordination, patient counseling, and formalizing plan of care.     Antonio Mc MD        Interval History:      Patient lying in bed.  Awake oriented.  Tolerating bite-size diet, tube feeds.    Denies any chest pain or palpitations.  No nausea vomiting.  No headache or dizziness.       Physical Exam:        Physical Exam   Temp:  [97.5  F (36.4  C)-98  F (36.7  C)] 98  F (36.7  C)  Pulse:  [67-83] 77  Resp:  [14-16] 16  BP: (112-125)/(53-62) 112/53  SpO2:  [95 %-99 %] 95 %    Intake/Output Summary (Last 24 hours) at 9/22/2022 0849  Last data filed at 9/21/2022 1916  Gross per 24 hour   Intake 240 ml   Output --   Net 240 ml       Admission Weight: 48.3 kg (106 lb 7.7 oz)  Current Weight: 58 kg (127 lb 13.9 oz)    PHYSICAL EXAM  GENERAL: Patient in no distress.   HEART: Regular rate and rhythm. S1S2. No murmurs  LUNGS: Clear to auscultation bilaterally. No expiratory wheeze.  Respirations unlabored  ABDOMEN: Soft, bowel sounds heard.  Feeding tube in place.  NEURO: Moving all extremities.  EXTREMITIES: No pedal edema.   SKIN: Warm, dry. No rash  PSYCHIATRY Cooperative       Medications:          banatrol plus  1 packet Per Feeding Tube TID w/meals     cholestyramine  1 packet Oral BID     insulin aspart  1-6 Units Subcutaneous Q8H     insulin glargine  8 Units Subcutaneous At Bedtime     levETIRAcetam  1,000 mg Oral or Feeding Tube Q12H     lidocaine  1 patch Transdermal  Q24H     lidocaine   Transdermal Q8H MARCIA     miconazole with skin protectant   Topical BID     mirtazapine  15 mg Oral or Feeding Tube At Bedtime     multivitamins w/minerals  15 mL Per Feeding Tube Daily     OXcarbazepine  450 mg Oral or Feeding Tube At Bedtime     pantoprazole  40 mg Oral or Feeding Tube QAM AC     QUEtiapine  25 mg Oral BID     QUEtiapine  50 mg Oral At Bedtime     saccharomyces boulardii  250 mg Per Feeding Tube BID     sodium chloride (PF)  10-40 mL Intracatheter Q8H     sodium chloride (PF)  10-40 mL Intracatheter Q7 Days     topiramate  50 mg Oral or Feeding Tube At Bedtime     vancomycin  125 mg Oral or Feeding Tube 4x Daily     [DISCONTINUED] acetaminophen **OR** acetaminophen, acetaminophen, artificial tears, glucose **OR** dextrose **OR** glucagon, guaiFENesin, ipratropium - albuterol 0.5 mg/2.5 mg/3 mL, melatonin, menthol-zinc oxide, metoprolol, miconazole with skin protectant, naloxone **OR** naloxone **OR** naloxone **OR** naloxone, ondansetron **OR** ondansetron, prochlorperazine **OR** prochlorperazine **OR** prochlorperazine, QUEtiapine, sodium chloride (PF), traMADol         Data:      All new lab and imaging data was reviewed.

## 2022-09-22 NOTE — PLAN OF CARE
Goal Outcome Evaluation:  Alert and oriented x4. Up with 2 gbw. T&R q2 hours.  VSS on RA. Incontinent of B&B. Peg infusing tube feed overnight. Soft and bite sized diet with thin liquids.

## 2022-09-23 ENCOUNTER — APPOINTMENT (OUTPATIENT)
Dept: PHYSICAL THERAPY | Facility: CLINIC | Age: 77
DRG: 023 | End: 2022-09-23
Attending: INTERNAL MEDICINE
Payer: COMMERCIAL

## 2022-09-23 LAB
ERYTHROCYTE [DISTWIDTH] IN BLOOD BY AUTOMATED COUNT: 20.3 % (ref 10–15)
GLUCOSE BLDC GLUCOMTR-MCNC: 147 MG/DL (ref 70–99)
GLUCOSE BLDC GLUCOMTR-MCNC: 79 MG/DL (ref 70–99)
GLUCOSE BLDC GLUCOMTR-MCNC: 98 MG/DL (ref 70–99)
GLUCOSE BLDC GLUCOMTR-MCNC: 99 MG/DL (ref 70–99)
HCT VFR BLD AUTO: 33.5 % (ref 35–47)
HGB BLD-MCNC: 10.5 G/DL (ref 11.7–15.7)
MCH RBC QN AUTO: 25.7 PG (ref 26.5–33)
MCHC RBC AUTO-ENTMCNC: 31.3 G/DL (ref 31.5–36.5)
MCV RBC AUTO: 82 FL (ref 78–100)
PLATELET # BLD AUTO: 513 10E3/UL (ref 150–450)
RBC # BLD AUTO: 4.08 10E6/UL (ref 3.8–5.2)
WBC # BLD AUTO: 7.2 10E3/UL (ref 4–11)

## 2022-09-23 PROCEDURE — 250N000013 HC RX MED GY IP 250 OP 250 PS 637: Performed by: INTERNAL MEDICINE

## 2022-09-23 PROCEDURE — 97530 THERAPEUTIC ACTIVITIES: CPT | Mod: GP

## 2022-09-23 PROCEDURE — 250N000013 HC RX MED GY IP 250 OP 250 PS 637: Performed by: HOSPITALIST

## 2022-09-23 PROCEDURE — 97116 GAIT TRAINING THERAPY: CPT | Mod: GP

## 2022-09-23 PROCEDURE — 99232 SBSQ HOSP IP/OBS MODERATE 35: CPT | Performed by: HOSPITALIST

## 2022-09-23 PROCEDURE — 85027 COMPLETE CBC AUTOMATED: CPT | Performed by: HOSPITALIST

## 2022-09-23 PROCEDURE — 250N000013 HC RX MED GY IP 250 OP 250 PS 637

## 2022-09-23 PROCEDURE — 120N000001 HC R&B MED SURG/OB

## 2022-09-23 PROCEDURE — 36415 COLL VENOUS BLD VENIPUNCTURE: CPT | Performed by: HOSPITALIST

## 2022-09-23 RX ADMIN — Medication 250 MG: at 20:40

## 2022-09-23 RX ADMIN — ACETAMINOPHEN 650 MG: 325 TABLET, FILM COATED ORAL at 13:02

## 2022-09-23 RX ADMIN — QUETIAPINE FUMARATE 50 MG: 50 TABLET ORAL at 22:12

## 2022-09-23 RX ADMIN — TRAMADOL HYDROCHLORIDE 25 MG: 50 TABLET ORAL at 03:10

## 2022-09-23 RX ADMIN — TRAMADOL HYDROCHLORIDE 25 MG: 50 TABLET ORAL at 15:11

## 2022-09-23 RX ADMIN — MICONAZOLE NITRATE: 20 CREAM TOPICAL at 09:04

## 2022-09-23 RX ADMIN — CHOLESTYRAMINE 4 G: 4 POWDER, FOR SUSPENSION ORAL at 09:02

## 2022-09-23 RX ADMIN — ACETAMINOPHEN 650 MG: 325 TABLET, FILM COATED ORAL at 09:01

## 2022-09-23 RX ADMIN — TOPIRAMATE 50 MG: 50 TABLET ORAL at 22:12

## 2022-09-23 RX ADMIN — Medication 250 MG: at 09:02

## 2022-09-23 RX ADMIN — LEVETIRACETAM 1000 MG: 100 SOLUTION ORAL at 22:15

## 2022-09-23 RX ADMIN — MIRTAZAPINE 15 MG: 15 TABLET, FILM COATED ORAL at 22:12

## 2022-09-23 RX ADMIN — QUETIAPINE FUMARATE 25 MG: 25 TABLET ORAL at 09:01

## 2022-09-23 RX ADMIN — Medication 1 PACKET: at 09:02

## 2022-09-23 RX ADMIN — CHOLESTYRAMINE 4 G: 4 POWDER, FOR SUSPENSION ORAL at 20:40

## 2022-09-23 RX ADMIN — INSULIN ASPART 1 UNITS: 100 INJECTION, SOLUTION INTRAVENOUS; SUBCUTANEOUS at 09:04

## 2022-09-23 RX ADMIN — LEVETIRACETAM 1000 MG: 100 SOLUTION ORAL at 09:03

## 2022-09-23 RX ADMIN — QUETIAPINE 12.5 MG: 25 TABLET, FILM COATED ORAL at 15:10

## 2022-09-23 RX ADMIN — ACETAMINOPHEN 650 MG: 325 TABLET, FILM COATED ORAL at 22:12

## 2022-09-23 RX ADMIN — ACETAMINOPHEN 650 MG: 325 TABLET, FILM COATED ORAL at 03:10

## 2022-09-23 RX ADMIN — MICONAZOLE NITRATE: 20 CREAM TOPICAL at 22:17

## 2022-09-23 RX ADMIN — Medication 1 PACKET: at 13:02

## 2022-09-23 RX ADMIN — ACETAMINOPHEN 650 MG: 325 TABLET, FILM COATED ORAL at 17:18

## 2022-09-23 RX ADMIN — OXCARBAZEPINE 450 MG: 300 SUSPENSION ORAL at 22:15

## 2022-09-23 RX ADMIN — Medication 15 ML: at 09:03

## 2022-09-23 RX ADMIN — LIDOCAINE 1 PATCH: 560 PATCH PERCUTANEOUS; TOPICAL; TRANSDERMAL at 09:02

## 2022-09-23 RX ADMIN — Medication 40 MG: at 09:03

## 2022-09-23 RX ADMIN — QUETIAPINE 12.5 MG: 25 TABLET, FILM COATED ORAL at 06:38

## 2022-09-23 RX ADMIN — QUETIAPINE FUMARATE 25 MG: 25 TABLET ORAL at 17:59

## 2022-09-23 RX ADMIN — Medication 1 PACKET: at 18:00

## 2022-09-23 RX ADMIN — VANCOMYCIN HYDROCHLORIDE 125 MG: KIT at 03:10

## 2022-09-23 ASSESSMENT — ACTIVITIES OF DAILY LIVING (ADL)
ADLS_ACUITY_SCORE: 39
ADLS_ACUITY_SCORE: 36
ADLS_ACUITY_SCORE: 38
ADLS_ACUITY_SCORE: 39
ADLS_ACUITY_SCORE: 43
ADLS_ACUITY_SCORE: 39
ADLS_ACUITY_SCORE: 43
ADLS_ACUITY_SCORE: 36
ADLS_ACUITY_SCORE: 38
ADLS_ACUITY_SCORE: 39

## 2022-09-23 NOTE — PLAN OF CARE
Goal Outcome Evaluation:    A&Ox 4. VSS on RA. Generalized back pain and headache, tylenol and tramadol given x1. Up assist x 1 GBW, ambulating to BR. Soft bite thin liquid diet, poor intake. TF via PEG tube 8p-8a with 200cc flushes q4hr. Swallows pills whole. Voiding to BR, incontinent at times. Skin intact ex scattered bruises, redness to kh[place] area. LS clear. BS active, no BM overnight. PIV SL. Discharge pending placement.

## 2022-09-23 NOTE — PLAN OF CARE
Goal Outcome Evaluation:    Shift [id number]  A&O x4. VSS on RA. Rated pain 9/10, T/R.Generalized weakness.  PEG- clamped: order  to infuse from 2000 to 0800. Not eating dinner yet, on bedside table. Up with A1 GB W per PT, OT. Pt scoring green on the Aggression Stop Light Tool. Plan to Discharge to a TCU, pending placement.

## 2022-09-23 NOTE — PLAN OF CARE
Goal Outcome Evaluation:          Pt here with Brain mass. A&O x4. Neuros weakness, on PEG to be infusing from 8pm to 8am (see orders for rate and free water flushes)  VSS. Not on Tele. Soft bite size diet, thin liquids. Takes pills whole with water with no swallowing issues(Tylenol, Seroquel, and Tramadol), other meds given through PEG. Up with A1 GB W to the toilet. Constant chronic headache, Tylenol given. Pt scoring green on the Aggression Stop Light Tool. Plan to Discharge to a TCU, awaiting placement.

## 2022-09-23 NOTE — PLAN OF CARE
No acute changes this shift. A&O x4, Neuros unchanged. VSS. Soft bite sized diet, thin liquids. Takes pills whole. PEG tube in place, TF started 8pm, due to be stopped at 8am. Running at 60ml/hr with Q4 200 ml FWF. Up with Ax1 GBW, pt did well ambulating to bathroom this shift but also had an episode of incontinence. Encouraged pt to call for us to bring her to the bathroom. Discharge to TCU pending.

## 2022-09-23 NOTE — PROGRESS NOTES
[facility]  Hospitalist Progress Note   09/23/2022          Assessment and Plan:       Julisa Chaney is a 77 year old female with trigeminal neuralgia transferred to [facility] from [facility] on 7/27/2022 for neurosurgical evaluation.    Admitted to [facility] on 7/19 for severe sepsis due to COVID-19 infection and suspected bacterial pneumonia. Treated with 5 days of remdesivir, did not require any steroids, also treated with IV vancomycin and IV meropenem.  Hospital course complicated by acute metabolic encephalopathy. CT head ( 7/25 ) showed possible left frontal mass causing vasogenic edema. MRI (7/27) showed possible left intracerebral abscess with ventriculitis versus neoplasm.  Switched to IV vancomycin, cefepime and metronidazole.  Transferred to [facility] for neurosurgery assessment.     Neurosurgery/neurology/infectious disease/oncology followed. Underwent (7/31) left frontal ventriculostomy.  Developed C. difficile while on antibiotics.  Mental status slowly improving.  Hospital course prolonged with discharge disposition.     Status post Severe sepsis secondary to ventriculitis with left lateral ventricle abscess. Resolved  S/p left frontal ventriculostomy 7/31/22 with subsequent EVD removal after 8 days  MRI brain 7/30 showed ventriculitis with probable abscess.   * Flow cytometry did not show any evidence of malignancy.  * s/p Left frontal ventriculostomy on 7/31/2022, EVD removed 8/8; CSF cultures have remained negative; CSF counts decreasing 469---233 ---56 (last CSF from 8/18)  * Head CT from 8/19 with stable presumed vasogenic edema in the white matter of the anterior inferior left frontal lobe related to ventriculitis of the left lateral ventricle; stable ventriculomegaly of the left lateral ventricle   * Has been having persistent headaches; reevaluated by neurosurgery 8/27 and recommended head CT, done on 8/29-->Left lateral  ventricle has hydrocephalus decreased in size. The left temporal horn remains slightly dilated but no samuel hydrocephalus. No acute changes.  - Remains afebrile; leukocytosis resolved ; was initially on vancomycin, cefepime, and metronidazole, then Meropenem and Vancomycin; ID signed off, completed 6 weeks treatment on 9/8/22.  * Appreciate neurosurgery, oncology, ID assistance.  - Progress has been slow and fairly minimal. Concern about overall prognosis. Briefly discussed palliative care with patient's daughter on 9/6/22. She requested re-evaluation from psychiatry regarding depression (completed 9/8/22, see consult note). Seems to want to see how she does with the medication prior to having any discussion about palliative care  - Trying to avoid any narcotics with her confusion/ encephalopathy.    Acute infectious encephalopathy due to above.  Resolved   Chronic pain, likely exacerbated by immobility  Possible mild cognitive impairment.  * Mentation much improved since admission ; fluctuating at times, will not engage in conversation most of the times.  - Continue to treat underlying issues as noted above  - Tylenol PRN. Continue Lidoderm patches for back. Decreased PRN ultram to 25 mg q12h. minimize narcotic use as able to.  - Maintain normal sleep/wake cycle as able  -Neurocognitive assessment as outpatient.    Suicidal thoughts/agitation  Depression  YUE   * With improvement in her mentation, she has started becoming at times restless and agitated. On 8/27, reported having suicidal thoughts. No plans/attempts.  * initiated suicide precautions and sitter, evaluated by psychiatry service, discontinued.   - Anti depressant not recommended currently in the setting of delirium/encephalopathy.  - HS seroquel increased to 50 mg 8/31  - Seroquel 25 mg BID and PRN Seroquel   - Continue Remeron to 15 mg  - Reconsulted psychiatry prn continues to have issues with anxiety     C. difficile colitis  Patient was noted to  have copious diarrhea on 8/1 and was noted positive for C. Difficile.  * Rectal tube came off 8/28 still with some diarrhea but not severe  - Continued oral vancomycin, last dose 9/22  - Continue questran from 4g daily to BID, loose stools improving  - Continue probiotic  - Barrier cream for skin breakdown     Acute on chronic anemia  Prior labs show baseline hemoglobin of about 9-10.   * Hb on admission was 11.7, slowly trended down, got 1 unit PRBC on 8/8/22 for Hb 6.9  * Received IV iron infusion x3 with first on 8/11/22.  Iron saturation 6. Vitamin B12 - high and folate was normal.  Hemoglobin stable around 10 over the last couple of weeks.  Monitor hgb periodically     History of trigeminal neuralgia  Continue Trileptal , Keppra and Topamax    History of chronic pyloric ulcer  EGD 2020 which showed gastroduodenitis with a duodenal stricture.  Continue PPI     Hyperglycemia, resolved  Recent HbA1c was 5.6 on 7/19/22. Was not on any medication prior to admission.  Decrease insulin Lantus to 4 units at nighttime.  Medium intensity sliding scale insulin.  Monitor sugars, optimize regimen.    Intermittent Hypokalemia, Hypophosphatemia  Replacement per protocol     Hypernatremia. Resolved  Had hypernatremia, was started on free water flushes.  Now with hyponatremia.  Sodium of 132.  Decrease free water flushes to 100 mL every 6 hours.  Monitor sodium in AM.     Severe dysphagia  Severe malnutrition in the setting of acute on chronic medical disease.  Nutrition following.  On tube feeding.  Soft diet.    Physical deconditioning  Will need TCU placement; declined by LTACH  PT/OT re-consulted as patient now agreeable.  Patient has been cooperative with them and they are recommending TCU   SW following for disposition.  Resent referrals as patient now participating with therapy     Contact/enteric precautions.  History of MRSA, Recovered COVID, C-difficile     Orders Placed This Encounter      Combination Diet Soft and  Bite Sized Diet (level 6); Thin Liquids (level 0) (pt prefers straws)      DVT Prophylaxis:  SCD, ambulate   Code Status: Full Code  Disposition: Expected discharge pending safe discharge plan in place    Discussed with patient, bedside RN, .  Total time greater than 25 minutes.   More than 70% of time spent in direct patient care, care coordination, patient counseling, and formalizing plan of care.     Antonio Mc MD        Interval History:        Patient lying in bed.  Awake oriented.  Tolerating bite-size diet, tube feeds.    Denies any chest pain or palpitations.  No nausea vomiting.  No headache or dizziness.  Receptive to participating in therapy.  Blood sugars in 90s noted.         Physical Exam:        Physical Exam   Temp:  [96.9  F (36.1  C)-98.2  F (36.8  C)] 96.9  F (36.1  C)  Pulse:  [65-77] 75  Resp:  [16-18] 18  BP: ()/(49-69) 106/58  SpO2:  [95 %-98 %] 95 %    Intake/Output Summary (Last 24 hours) at 9/22/2022 0849  Last data filed at 9/21/2022 1916  Gross per 24 hour   Intake 240 ml   Output --   Net 240 ml       Admission Weight: 48.3 kg (106 lb 7.7 oz)  Current Weight: 58 kg (127 lb 13.9 oz)    PHYSICAL EXAM  GENERAL: Patient in no distress.   LUNGS: Clear to auscultation bilaterally. No expiratory wheeze.  Respirations unlabored  ABDOMEN: Soft, bowel sounds heard.  Feeding tube in place.  NEURO: Moving all extremities.  EXTREMITIES: No pedal edema.   SKIN: Warm, dry. No rash  PSYCHIATRY Cooperative       Medications:          banatrol plus  1 packet Per Feeding Tube TID w/meals     cholestyramine  1 packet Oral BID     insulin aspart  1-6 Units Subcutaneous Q8H     insulin glargine  8 Units Subcutaneous At Bedtime     levETIRAcetam  1,000 mg Oral or Feeding Tube Q12H     lidocaine  1 patch Transdermal Q24H     lidocaine   Transdermal Q8H MARCIA     miconazole with skin protectant   Topical BID     mirtazapine  15 mg Oral or Feeding Tube At Bedtime     multivitamins  w/minerals  15 mL Per Feeding Tube Daily     OXcarbazepine  450 mg Oral or Feeding Tube At Bedtime     pantoprazole  40 mg Oral or Feeding Tube QAM AC     QUEtiapine  25 mg Oral BID     QUEtiapine  50 mg Oral At Bedtime     saccharomyces boulardii  250 mg Per Feeding Tube BID     sodium chloride (PF)  10-40 mL Intracatheter Q8H     sodium chloride (PF)  10-40 mL Intracatheter Q7 Days     topiramate  50 mg Oral or Feeding Tube At Bedtime     [DISCONTINUED] acetaminophen **OR** acetaminophen, acetaminophen, artificial tears, glucose **OR** dextrose **OR** glucagon, guaiFENesin, ipratropium - albuterol 0.5 mg/2.5 mg/3 mL, melatonin, menthol-zinc oxide, metoprolol, miconazole with skin protectant, naloxone **OR** naloxone **OR** naloxone **OR** naloxone, ondansetron **OR** ondansetron, prochlorperazine **OR** prochlorperazine **OR** prochlorperazine, QUEtiapine, sodium chloride (PF), traMADol         Data:      All new lab and imaging data was reviewed.

## 2022-09-24 LAB
GLUCOSE BLDC GLUCOMTR-MCNC: 100 MG/DL (ref 70–99)
GLUCOSE BLDC GLUCOMTR-MCNC: 89 MG/DL (ref 70–99)
SODIUM SERPL-SCNC: 137 MMOL/L (ref 133–144)

## 2022-09-24 PROCEDURE — 250N000013 HC RX MED GY IP 250 OP 250 PS 637: Performed by: HOSPITALIST

## 2022-09-24 PROCEDURE — 250N000013 HC RX MED GY IP 250 OP 250 PS 637: Performed by: INTERNAL MEDICINE

## 2022-09-24 PROCEDURE — 99232 SBSQ HOSP IP/OBS MODERATE 35: CPT | Performed by: HOSPITALIST

## 2022-09-24 PROCEDURE — 120N000001 HC R&B MED SURG/OB

## 2022-09-24 PROCEDURE — 36415 COLL VENOUS BLD VENIPUNCTURE: CPT | Performed by: HOSPITALIST

## 2022-09-24 PROCEDURE — 84295 ASSAY OF SERUM SODIUM: CPT | Performed by: HOSPITALIST

## 2022-09-24 PROCEDURE — 250N000013 HC RX MED GY IP 250 OP 250 PS 637

## 2022-09-24 RX ORDER — NORTRIPTYLINE HCL 25 MG
25 CAPSULE ORAL 2 TIMES DAILY
Status: DISCONTINUED | OUTPATIENT
Start: 2022-09-24 | End: 2022-10-04 | Stop reason: HOSPADM

## 2022-09-24 RX ADMIN — MICONAZOLE NITRATE: 20 CREAM TOPICAL at 08:42

## 2022-09-24 RX ADMIN — Medication 250 MG: at 08:41

## 2022-09-24 RX ADMIN — OXCARBAZEPINE 450 MG: 300 SUSPENSION ORAL at 21:47

## 2022-09-24 RX ADMIN — CHOLESTYRAMINE 4 G: 4 POWDER, FOR SUSPENSION ORAL at 20:33

## 2022-09-24 RX ADMIN — TRAMADOL HYDROCHLORIDE 25 MG: 50 TABLET ORAL at 18:51

## 2022-09-24 RX ADMIN — MICONAZOLE NITRATE: 20 CREAM TOPICAL at 20:36

## 2022-09-24 RX ADMIN — ACETAMINOPHEN 650 MG: 325 TABLET, FILM COATED ORAL at 10:29

## 2022-09-24 RX ADMIN — QUETIAPINE FUMARATE 25 MG: 25 TABLET ORAL at 08:41

## 2022-09-24 RX ADMIN — Medication 1 PACKET: at 12:25

## 2022-09-24 RX ADMIN — NORTRIPTYLINE HYDROCHLORIDE 25 MG: 25 CAPSULE ORAL at 20:34

## 2022-09-24 RX ADMIN — ACETAMINOPHEN 650 MG: 325 TABLET, FILM COATED ORAL at 03:28

## 2022-09-24 RX ADMIN — NORTRIPTYLINE HYDROCHLORIDE 25 MG: 25 CAPSULE ORAL at 12:25

## 2022-09-24 RX ADMIN — TRAMADOL HYDROCHLORIDE 25 MG: 50 TABLET ORAL at 10:29

## 2022-09-24 RX ADMIN — Medication 1 PACKET: at 08:52

## 2022-09-24 RX ADMIN — ACETAMINOPHEN 650 MG: 325 TABLET, FILM COATED ORAL at 16:11

## 2022-09-24 RX ADMIN — LIDOCAINE 1 PATCH: 560 PATCH PERCUTANEOUS; TOPICAL; TRANSDERMAL at 08:40

## 2022-09-24 RX ADMIN — MIRTAZAPINE 15 MG: 15 TABLET, FILM COATED ORAL at 21:47

## 2022-09-24 RX ADMIN — TRAMADOL HYDROCHLORIDE 25 MG: 50 TABLET ORAL at 03:28

## 2022-09-24 RX ADMIN — LEVETIRACETAM 1000 MG: 100 SOLUTION ORAL at 20:36

## 2022-09-24 RX ADMIN — Medication 40 MG: at 08:39

## 2022-09-24 RX ADMIN — Medication 250 MG: at 20:33

## 2022-09-24 RX ADMIN — ACETAMINOPHEN 650 MG: 325 TABLET, FILM COATED ORAL at 21:47

## 2022-09-24 RX ADMIN — Medication 1 PACKET: at 18:51

## 2022-09-24 RX ADMIN — LEVETIRACETAM 1000 MG: 100 SOLUTION ORAL at 08:40

## 2022-09-24 RX ADMIN — Medication 15 ML: at 08:40

## 2022-09-24 RX ADMIN — QUETIAPINE FUMARATE 25 MG: 25 TABLET ORAL at 16:11

## 2022-09-24 RX ADMIN — QUETIAPINE FUMARATE 50 MG: 50 TABLET ORAL at 21:47

## 2022-09-24 RX ADMIN — CHOLESTYRAMINE 4 G: 4 POWDER, FOR SUSPENSION ORAL at 08:40

## 2022-09-24 RX ADMIN — TOPIRAMATE 50 MG: 50 TABLET ORAL at 21:47

## 2022-09-24 ASSESSMENT — ACTIVITIES OF DAILY LIVING (ADL)
ADLS_ACUITY_SCORE: 39

## 2022-09-24 NOTE — PLAN OF CARE
Goal Outcome Evaluation:    VSS, reports HA, meds per order and ICE Pack used. Anxiety at times, needs attentive listening .Feeding Tube started 2100 will run for 12 hours. BGM run low. HS insulin held. Plan to recheck level. Incontinent with urine. Change and reposition done every 2 hours. Continue to monitor.

## 2022-09-24 NOTE — PLAN OF CARE
Reason for Admission: Cerebral abscess   Cognitive/Mentation: A/Ox 4, anxious   Neuros/CMS: Stable with generalized weakness  VS: VSS on RA   Tele: N/A  GI: BS audible, + flatus, no BM this shift. Incontinent.  : Voiding adequately. Incontinent, refusing purewick.  Pulmonary: LS clear  Pain: Headache and back pain, prn tramadol and tylenol given.   Drains/Lines: PIV SL, PEG tube infusing TF.   Skin: Intact, redness in perineum/buttocks area. Skin barrier applied.   Activity: Assist x 1 with GB/W.  Diet: Soft and bite sized diet with thin liquids. Takes pills whole. TF running from  8pm-8am.    Therapies recs: TCU  Discharge: Pending placement   Aggression Stoplight Tool: Green   End of shift summary: No changes this shift.

## 2022-09-24 NOTE — PROGRESS NOTES
[facility]  Hospitalist Progress Note   09/24/2022          Assessment and Plan:       Julisa Chaney is a 77 year old female with trigeminal neuralgia transferred to [facility] from [facility] on 7/27/2022 for neurosurgical evaluation.    Admitted to [facility] on 7/19 for severe sepsis due to COVID-19 infection and suspected bacterial pneumonia. Treated with 5 days of remdesivir, did not require any steroids, also treated with IV vancomycin and IV meropenem.  Hospital course complicated by acute metabolic encephalopathy. CT head ( 7/25 ) showed possible left frontal mass causing vasogenic edema. MRI (7/27) showed possible left intracerebral abscess with ventriculitis versus neoplasm.  Switched to IV vancomycin, cefepime and metronidazole.  Transferred to [facility] for neurosurgery assessment.     Neurosurgery/neurology/infectious disease/oncology followed. Underwent (7/31) left frontal ventriculostomy.  Developed C. difficile while on antibiotics. Mental status slowly improving.  Hospital course prolonged with discharge disposition.     Status post Severe sepsis secondary to ventriculitis with left lateral ventricle abscess. Resolved  S/p left frontal ventriculostomy 7/31/22 with subsequent EVD removal after 8 days  MRI brain 7/30 showed ventriculitis with probable abscess.   * Flow cytometry did not show any evidence of malignancy.  * s/p Left frontal ventriculostomy on 7/31/2022, EVD removed 8/8; CSF cultures have remained negative; CSF counts decreasing 469---233 ---56 (last CSF from 8/18)  * Head CT from 8/19 with stable presumed vasogenic edema in the white matter of the anterior inferior left frontal lobe related to ventriculitis of the left lateral ventricle; stable ventriculomegaly of the left lateral ventricle   * Has been having persistent headaches; reevaluated by neurosurgery 8/27 and recommended head CT, done on 8/29-->Left lateral  ventricle has hydrocephalus decreased in size. The left temporal horn remains slightly dilated but no samuel hydrocephalus. No acute changes.  - Remains afebrile; leukocytosis resolved ; was initially on vancomycin, cefepime, and metronidazole, then Meropenem and Vancomycin; ID signed off, completed 6 weeks treatment on 9/8/22.  * Appreciate neurosurgery, oncology, ID assistance.  - Progress has been slow and fairly minimal. Concern about overall prognosis. Briefly discussed palliative care with patient's daughter on 9/6/22. She requested re-evaluation from psychiatry regarding depression (completed 9/8/22, see consult note). Seems to want to see how she does with the medication prior to having any discussion about palliative care  - Trying to avoid any narcotics with her confusion/ encephalopathy.    Acute infectious encephalopathy due to above.  Resolved   Chronic pain, likely exacerbated by immobility  Possible mild cognitive impairment.  * Mentation much improved since admission ; fluctuating at times, will not engage in conversation most of the times.  - Continue to treat underlying issues as noted above  - Tylenol PRN. Continue Lidoderm patches for back. PRN ultram to 25 mg q8h. minimize narcotic use as able to.  - Maintain normal sleep/wake cycle as able  -Neurocognitive assessment as outpatient.    Suicidal thoughts/agitation  Depression  YUE   * With improvement in her mentation, she has started becoming at times restless and agitated. On 8/27, reported having suicidal thoughts. No plans/attempts.  * initiated suicide precautions and sitter, evaluated by psychiatry service, discontinued.   Anti depressant not recommended currently in the setting of delirium/encephalopathy.  HS seroquel increased to 50 mg 8/31  Seroquel 25 mg BID and PRN Seroquel   Continue Remeron to 15 mg  Restarted PTA nortriptyline [9/24)  Will reconsult psychiatry if continues to have issues with anxiety     C. difficile colitis  Patient  was noted to have copious diarrhea on 8/1, positive for C. Difficile.  * Rectal tube came off 8/28 still with some diarrhea but not severe  Completed treatment with oral vancomycin  Continue questran and probiotic, loose stools improved.      Acute on chronic anemia  Prior labs show baseline hemoglobin of about 9-10.   Hb on admission was 11.7, slowly trended down, got 1 unit PRBC on 8/8/22 for Hb 6.9  Received IV iron infusion x3 with first on 8/11/22.  Iron saturation 6. Vitamin B12 - high and folate was normal.    Hemoglobin stable around 10 over the last couple of weeks.  Monitor hgb periodically     History of trigeminal neuralgia  Continue PTA Trileptal , Keppra and Topamax    History of chronic pyloric ulcer  EGD 2020 which showed gastroduodenitis with a duodenal stricture.  Continue PPI     Hyperglycemia, resolved  Recent HbA1c was 5.6 on 7/19/22. Was not on any medication prior to admission.  Discontinued insulin Lantus to 4 units at nighttime.  Medium intensity sliding scale insulin.  Monitor sugars, optimize regimen.    Intermittent Hypokalemia, Hypophosphatemia  Replacement per protocol     Hypernatremia. Resolved  Had hypernatremia, was started on free water flushes.  Had hyponatremia with sodium of 132.  Decrease free water flushes 200 mL every 6 hours and sodium improved to 137.  Monitor serum sodium periodically     Severe dysphagia  Severe malnutrition in the setting of acute on chronic medical disease.  Nutrition following.  On tube feeding at night time.  Soft diet.    Physical deconditioning  Will need TCU placement; declined by [facility]ACH  PT/OT re-consulted as patient now agreeable.  Patient has been cooperative with them and they are recommending TCU   SW following for disposition.  Resent referrals as patient now participating with therapy     Contact/enteric precautions.  History of MRSA, Recovered COVID, C-difficile     Orders Placed This Encounter      Combination Diet Soft and Bite Sized Diet  (level 6); Thin Liquids (level 0) (pt prefers straws)      DVT Prophylaxis:  SCD, ambulate   Code Status: Full Code  Disposition: Expected discharge pending safe discharge plan in place    Discussed with patient, floor RN.  Total time greater than 25 minutes.  More than 70% of time spent in direct patient care, care coordination, patient counseling, and formalizing plan of care.     Antonio Mc MD        Interval History:        Patient lying in bed.  Awake oriented.  Tolerating bite-size diet, tube feeds.    Denies any chest pain or palpitations.  No nausea vomiting.  No headache or dizziness.  Blood sugars in 79 noted.    Per report assistance of 1 with gait belt and walker for minimal ambulation.  Receiving tube feeds overnight.  Requiring Tylenol, tramadol for pain.  Patient continues to complain of pain.  Reporting anxiety. Complaining of insomnia.  Reporting home medications have not been restarted.  Reports would like to get out of the hospital as soon as possible.           Physical Exam:        Physical Exam   Temp:  [96.7  F (35.9  C)-97.5  F (36.4  C)] 96.7  F (35.9  C)  Pulse:  [69-85] 78  Resp:  [16-20] 20  BP: (110-127)/(48-60) 110/48  SpO2:  [96 %-100 %] 99 %    Intake/Output Summary (Last 24 hours) at 9/22/2022 0849  Last data filed at 9/21/2022 1916  Gross per 24 hour   Intake 240 ml   Output --   Net 240 ml       Admission Weight: 48.3 kg (106 lb 7.7 oz)  Current Weight: 58 kg (127 lb 13.9 oz)    PHYSICAL EXAM  GENERAL: Patient in no distress.   LUNGS: Clear to auscultation bilaterally. No expiratory wheeze.  Respirations unlabored  ABDOMEN: Soft, bowel sounds heard.  Feeding tube in place.  NEURO: Moving all extremities.  EXTREMITIES: No pedal edema.   SKIN: Warm, dry. No rash  PSYCHIATRY Cooperative       Medications:          banatrol plus  1 packet Per Feeding Tube TID w/meals     cholestyramine  1 packet Oral BID     insulin aspart  1-6 Units Subcutaneous Q8H     insulin glargine  4  Units Subcutaneous At Bedtime     levETIRAcetam  1,000 mg Oral or Feeding Tube Q12H     lidocaine  1 patch Transdermal Q24H     lidocaine   Transdermal Q8H MARCIA     miconazole with skin protectant   Topical BID     mirtazapine  15 mg Oral or Feeding Tube At Bedtime     multivitamins w/minerals  15 mL Per Feeding Tube Daily     OXcarbazepine  450 mg Oral or Feeding Tube At Bedtime     pantoprazole  40 mg Oral or Feeding Tube QAM AC     QUEtiapine  25 mg Oral BID     QUEtiapine  50 mg Oral At Bedtime     saccharomyces boulardii  250 mg Per Feeding Tube BID     sodium chloride (PF)  10-40 mL Intracatheter Q8H     sodium chloride (PF)  10-40 mL Intracatheter Q7 Days     topiramate  50 mg Oral or Feeding Tube At Bedtime     [DISCONTINUED] acetaminophen **OR** acetaminophen, acetaminophen, artificial tears, glucose **OR** dextrose **OR** glucagon, guaiFENesin, ipratropium - albuterol 0.5 mg/2.5 mg/3 mL, melatonin, menthol-zinc oxide, metoprolol, miconazole with skin protectant, naloxone **OR** naloxone **OR** naloxone **OR** naloxone, ondansetron **OR** ondansetron, prochlorperazine **OR** prochlorperazine **OR** prochlorperazine, QUEtiapine, sodium chloride (PF), traMADol         Data:      All new lab and imaging data was reviewed.

## 2022-09-25 ENCOUNTER — HEALTH MAINTENANCE LETTER (OUTPATIENT)
Age: 77
End: 2022-09-25

## 2022-09-25 LAB
GLUCOSE BLDC GLUCOMTR-MCNC: 124 MG/DL (ref 70–99)
GLUCOSE BLDC GLUCOMTR-MCNC: 92 MG/DL (ref 70–99)

## 2022-09-25 PROCEDURE — 250N000013 HC RX MED GY IP 250 OP 250 PS 637: Performed by: HOSPITALIST

## 2022-09-25 PROCEDURE — 250N000013 HC RX MED GY IP 250 OP 250 PS 637

## 2022-09-25 PROCEDURE — 250N000013 HC RX MED GY IP 250 OP 250 PS 637: Performed by: INTERNAL MEDICINE

## 2022-09-25 PROCEDURE — 120N000001 HC R&B MED SURG/OB

## 2022-09-25 PROCEDURE — 99232 SBSQ HOSP IP/OBS MODERATE 35: CPT | Performed by: HOSPITALIST

## 2022-09-25 RX ADMIN — ACETAMINOPHEN 650 MG: 325 TABLET, FILM COATED ORAL at 05:44

## 2022-09-25 RX ADMIN — MIRTAZAPINE 15 MG: 15 TABLET, FILM COATED ORAL at 20:17

## 2022-09-25 RX ADMIN — NORTRIPTYLINE HYDROCHLORIDE 25 MG: 25 CAPSULE ORAL at 20:17

## 2022-09-25 RX ADMIN — Medication 1 PACKET: at 12:07

## 2022-09-25 RX ADMIN — QUETIAPINE FUMARATE 25 MG: 25 TABLET ORAL at 08:27

## 2022-09-25 RX ADMIN — Medication 250 MG: at 08:00

## 2022-09-25 RX ADMIN — Medication 1 PACKET: at 08:51

## 2022-09-25 RX ADMIN — CHOLESTYRAMINE 4 G: 4 POWDER, FOR SUSPENSION ORAL at 08:28

## 2022-09-25 RX ADMIN — QUETIAPINE FUMARATE 25 MG: 25 TABLET ORAL at 15:50

## 2022-09-25 RX ADMIN — LEVETIRACETAM 1000 MG: 100 SOLUTION ORAL at 20:22

## 2022-09-25 RX ADMIN — QUETIAPINE 12.5 MG: 25 TABLET, FILM COATED ORAL at 15:49

## 2022-09-25 RX ADMIN — Medication 250 MG: at 20:17

## 2022-09-25 RX ADMIN — NORTRIPTYLINE HYDROCHLORIDE 25 MG: 25 CAPSULE ORAL at 08:00

## 2022-09-25 RX ADMIN — Medication 1 DROP: at 12:07

## 2022-09-25 RX ADMIN — TRAMADOL HYDROCHLORIDE 25 MG: 50 TABLET ORAL at 12:07

## 2022-09-25 RX ADMIN — Medication 1 DROP: at 05:44

## 2022-09-25 RX ADMIN — QUETIAPINE FUMARATE 50 MG: 50 TABLET ORAL at 20:17

## 2022-09-25 RX ADMIN — MICONAZOLE NITRATE: 20 CREAM TOPICAL at 08:44

## 2022-09-25 RX ADMIN — QUETIAPINE 12.5 MG: 25 TABLET, FILM COATED ORAL at 08:27

## 2022-09-25 RX ADMIN — TOPIRAMATE 50 MG: 50 TABLET ORAL at 20:17

## 2022-09-25 RX ADMIN — LEVETIRACETAM 1000 MG: 100 SOLUTION ORAL at 08:50

## 2022-09-25 RX ADMIN — MICONAZOLE NITRATE: 20 CREAM TOPICAL at 20:26

## 2022-09-25 RX ADMIN — OXCARBAZEPINE 450 MG: 300 SUSPENSION ORAL at 20:22

## 2022-09-25 RX ADMIN — Medication 1 PACKET: at 17:34

## 2022-09-25 RX ADMIN — Medication 15 ML: at 08:50

## 2022-09-25 RX ADMIN — TRAMADOL HYDROCHLORIDE 25 MG: 50 TABLET ORAL at 20:17

## 2022-09-25 RX ADMIN — ACETAMINOPHEN 650 MG: 325 TABLET, FILM COATED ORAL at 15:49

## 2022-09-25 RX ADMIN — CHOLESTYRAMINE 4 G: 4 POWDER, FOR SUSPENSION ORAL at 20:17

## 2022-09-25 RX ADMIN — ACETAMINOPHEN 650 MG: 325 TABLET, FILM COATED ORAL at 12:07

## 2022-09-25 RX ADMIN — Medication 40 MG: at 08:50

## 2022-09-25 RX ADMIN — LIDOCAINE 1 PATCH: 560 PATCH PERCUTANEOUS; TOPICAL; TRANSDERMAL at 08:28

## 2022-09-25 RX ADMIN — TRAMADOL HYDROCHLORIDE 25 MG: 50 TABLET ORAL at 03:28

## 2022-09-25 ASSESSMENT — ACTIVITIES OF DAILY LIVING (ADL)
ADLS_ACUITY_SCORE: 39

## 2022-09-25 NOTE — PLAN OF CARE
Reason for Admission: Cerebral abscess   Cognitive/Mentation: A/Ox 4, anxious   Neuros/CMS: Stable with generalized weakness  VS: VSS on RA   Tele: N/A  GI: BS audible, + flatus, no BM this shift. Incontinent.  : Voiding adequately. Incontinent, refusing purewick.  Pulmonary: LS clear  Pain: Headache and back pain managed with ice packs and prn tramadol and tylenol.   Drains/Lines: PIV SL, PEG tube infusing TF overnight.   Skin: Intact, redness in perineum/buttocks area. Skin barrier applied.   Activity: Assist x 1 with GB/W.  Diet: Soft and bite sized diet with thin liquids. Takes pills whole. TF running from  8pm-8am.    Therapies recs: TCU  Discharge: Pending placement   Aggression Stoplight Tool: Green   End of shift summary: No changes this shift. Enteric/contact precautions maintained.

## 2022-09-25 NOTE — PROGRESS NOTES
Pt here with cerebral abscess. A&O X4. Neuros generalized weakness. VSS. Soft and bite size diet, thin liquids. NOC TF running 8pm-8am. Takes pills whole- one at a time with water. Up with Ax1 GB/W. Pain managed with ice packs and PRN medications. Pt scoring green on the Aggression Stop Light Tool. Discharge to TCU pending.

## 2022-09-25 NOTE — PROGRESS NOTES
[facility]  Hospitalist Progress Note   09/25/2022          Assessment and Plan:       Julisa Chaney is a 77 year old female with trigeminal neuralgia transferred to [facility] from [facility] on 7/27/2022 for neurosurgical evaluation.    Admitted to [facility] on 7/19 for severe sepsis due to COVID-19 infection and bacterial pneumonia. Treated with 5 days of remdesivir, did not require any steroids and IV vancomycin and IV meropenem.  Hospital course complicated by acute metabolic encephalopathy. CT head ( 7/25 ) showed possible left frontal mass causing vasogenic edema. MRI (7/27) showed possible left intracerebral abscess with ventriculitis versus neoplasm.  Switched to IV vancomycin, cefepime and metronidazole. Transferred to [facility] for neurosurgery assessment.     Neurosurgery/neurology/infectious disease/oncology followed. Underwent (7/31) left frontal ventriculostomy.  Developed C. difficile while on antibiotics. Mental status slowly improving.  Hospital course prolonged with discharge disposition.     Status post Severe sepsis secondary to ventriculitis with left lateral ventricle abscess. Resolved  S/p left frontal ventriculostomy 7/31/22 with subsequent EVD removal after 8 days  MRI brain 7/30 showed ventriculitis with probable abscess.   * Flow cytometry did not show any evidence of malignancy.  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 The left temporal horn remains slightly dilated but no samuel hydrocephalus. No acute changes.  - Remains afebrile; leukocytosis resolved ; was initially on vancomycin, cefepime, and metronidazole, then Meropenem and Vancomycin; ID signed off, completed 6 weeks treatment on 9/8/22.  * Appreciate neurosurgery, oncology, ID assistance.  - Progress has been slow and fairly minimal. Concern about overall prognosis. Briefly discussed palliative care with patient's daughter on 9/6/22. She requested re-evaluation from psychiatry regarding depression (completed 9/8/22, see consult note). Seems to want to see how she does with the medication prior to having any discussion about palliative care    Acute infectious encephalopathy due to above.  Resolved   Chronic pain, likely exacerbated by immobility  Possible mild cognitive impairment.  Mentation much improved since admission; fluctuating at times, will not engage in conversation most of the times.  Continue to treat underlying issues as noted above  Tylenol PRN. Continue Lidoderm patches for back. PRN ultram to 25 mg q8h. minimize narcotic use as able to.  Maintain normal sleep/wake cycle as able.  Neurocognitive assessment as outpatient.    Suicidal thoughts/agitation  Depression, YUE   With improvement in her mentation, she has started becoming at times restless and agitated.   On 8/27, reported having suicidal thoughts. No plans/attempts. Initiated suicide precautions and sitter, evaluated by psychiatry service, discontinued.   Anti depressant not recommended currently in the setting of delirium/encephalopathy.  HS seroquel increased to 50 mg 8/31  Seroquel 25 mg BID and PRN Seroquel   Continue Remeron to 15 mg  Continue PTA nortriptyline (9/24)  Reconsult psychiatry if continues to have issues with anxiety     C. difficile colitis -treated   Patient was noted to have copious diarrhea on 8/1, positive for C. Difficile. Rectal tube came off  8/28.  Completed treatment with oral vancomycin.  Continue questran and probiotic.    Acute on chronic anemia  Prior labs show baseline hemoglobin of about 9-10.   Hb on admission was 11.7, slowly trended down, got 1 unit PRBC on 8/8/22 for Hb 6.9  Received IV iron infusion x 3 with first on 8/11/22.  Iron saturation 6. Vitamin B12 - high and folate was normal.    Hemoglobin stable around 10 over the last couple of weeks.  Monitor hgb periodically     History of trigeminal neuralgia  Continue PTA Trileptal , Keppra and Topamax.    History of chronic pyloric ulcer  EGD 2020 which showed gastroduodenitis with a duodenal stricture.  Continue PPI.     Hyperglycemia, resolved  Recent HbA1c was 5.6 on 7/19/22.  PTA not on meds.  Discontinue sliding scale insulin.  Monitor sugars twice daily    Intermittent Hypokalemia, Hypophosphatemia  Replacement per protocol     Hypernatremia. Resolved  Had hypernatremia, was started on free water flushes.  Had hyponatremia with sodium of 132.  Decrease free water flushes 200 mL every 6 hours and sodium improved to 137.  Monitor serum sodium periodically     Severe dysphagia  Severe malnutrition in the setting of acute on chronic medical disease.  Nutrition following.  On tube feeding at night time.  Soft diet.    Physical deconditioning  Will need TCU placement; declined by LTACH  PT/OT re-consulted as patient now agreeable.  Patient has been cooperative with them and they are recommending TCU   SW following for disposition.  Resent referrals as patient now participating with therapy     Contact/enteric precautions.  History of MRSA, Recovered COVID, C-difficile     Orders Placed This Encounter      Combination Diet Soft and Bite Sized Diet (level 6); Thin Liquids (level 0) (pt prefers straws)      DVT Prophylaxis:  SCD, ambulate   Code Status: Full Code  Disposition: Expected discharge pending safe discharge plan in place    Discussed with patient, floor RN.  More than 70% of time  spent in direct patient care, care coordination, patient counseling, and formalizing plan of care.     Antonio Mc MD        Interval History:        Patient lying in bed.  Awake oriented.  Tolerating bite-size diet, tube feeds.    Denies any chest pain or palpitations.  No nausea vomiting.  No headache or dizziness.  Blood sugars 92.   Per report assistance of 1 with gait belt and walker for minimal ambulation.  Receiving tube feeds overnight.         Physical Exam:        Physical Exam   Temp:  [96.7  F (35.9  C)-97.8  F (36.6  C)] 97.8  F (36.6  C)  Pulse:  [76-81] 81  Resp:  [14-20] 14  BP: (101-110)/(52-66) 110/62  SpO2:  [96 %-97 %] 97 %    Intake/Output Summary (Last 24 hours) at 9/22/2022 0849  Last data filed at 9/21/2022 1916  Gross per 24 hour   Intake 240 ml   Output --   Net 240 ml       Admission Weight: 48.3 kg (106 lb 7.7 oz)  Current Weight: 58 kg (127 lb 13.9 oz)    PHYSICAL EXAM  GENERAL: Patient in no distress.   LUNGS:Respirations unlabored  ABDOMEN: Soft, bowel sounds heard.  Feeding tube in place.  NEURO: Moving all extremities.  EXTREMITIES: No pedal edema.   SKIN: Warm, dry. No rash  PSYCHIATRY Cooperative       Medications:          banatrol plus  1 packet Per Feeding Tube TID w/meals     cholestyramine  1 packet Oral BID     insulin aspart  1-6 Units Subcutaneous Q8H     levETIRAcetam  1,000 mg Oral or Feeding Tube Q12H     lidocaine  1 patch Transdermal Q24H     lidocaine   Transdermal Q8H MARCIA     miconazole with skin protectant   Topical BID     mirtazapine  15 mg Oral or Feeding Tube At Bedtime     multivitamins w/minerals  15 mL Per Feeding Tube Daily     nortriptyline  25 mg Oral BID     OXcarbazepine  450 mg Oral or Feeding Tube At Bedtime     pantoprazole  40 mg Oral or Feeding Tube QAM AC     QUEtiapine  25 mg Oral BID     QUEtiapine  50 mg Oral At Bedtime     saccharomyces boulardii  250 mg Per Feeding Tube BID     sodium chloride (PF)  10-40 mL Intracatheter Q8H     sodium  chloride (PF)  10-40 mL Intracatheter Q7 Days     topiramate  50 mg Oral or Feeding Tube At Bedtime     [DISCONTINUED] acetaminophen **OR** acetaminophen, acetaminophen, artificial tears, glucose **OR** dextrose **OR** glucagon, guaiFENesin, ipratropium - albuterol 0.5 mg/2.5 mg/3 mL, melatonin, menthol-zinc oxide, metoprolol, miconazole with skin protectant, naloxone **OR** naloxone **OR** naloxone **OR** naloxone, ondansetron **OR** ondansetron, prochlorperazine **OR** prochlorperazine **OR** prochlorperazine, QUEtiapine, sodium chloride (PF), traMADol         Data:      All new lab and imaging data was reviewed.

## 2022-09-25 NOTE — PROGRESS NOTES
Pt here with cerebral abscess. A&O X4. Neuros generalized weakness. VSS. Soft and bite size diet, thin liquids. NOC TF running 8pm-8am. Takes pills whole- one at a time with water. Up with Ax1 GB/W. Pt was continent during shift, walking to bathroom with RN. Pain managed with ice packs and PRN medications. Pt scoring green on the Aggression Stop Light Tool. Discharge to TCU pending

## 2022-09-26 ENCOUNTER — APPOINTMENT (OUTPATIENT)
Dept: OCCUPATIONAL THERAPY | Facility: CLINIC | Age: 77
DRG: 023 | End: 2022-09-26
Attending: HOSPITALIST
Payer: COMMERCIAL

## 2022-09-26 ENCOUNTER — APPOINTMENT (OUTPATIENT)
Dept: SPEECH THERAPY | Facility: CLINIC | Age: 77
DRG: 023 | End: 2022-09-26
Attending: INTERNAL MEDICINE
Payer: COMMERCIAL

## 2022-09-26 ENCOUNTER — APPOINTMENT (OUTPATIENT)
Dept: PHYSICAL THERAPY | Facility: CLINIC | Age: 77
DRG: 023 | End: 2022-09-26
Attending: INTERNAL MEDICINE
Payer: COMMERCIAL

## 2022-09-26 LAB
ANION GAP SERPL CALCULATED.3IONS-SCNC: 8 MMOL/L (ref 3–14)
BUN SERPL-MCNC: 18 MG/DL (ref 7–30)
CALCIUM SERPL-MCNC: 8.9 MG/DL (ref 8.5–10.1)
CHLORIDE BLD-SCNC: 104 MMOL/L (ref 94–109)
CO2 SERPL-SCNC: 24 MMOL/L (ref 20–32)
CREAT SERPL-MCNC: 0.52 MG/DL (ref 0.52–1.04)
GFR SERPL CREATININE-BSD FRML MDRD: >90 ML/MIN/1.73M2
GLUCOSE BLD-MCNC: 144 MG/DL (ref 70–99)
MAGNESIUM SERPL-MCNC: 2.2 MG/DL (ref 1.6–2.3)
PHOSPHATE SERPL-MCNC: 4.2 MG/DL (ref 2.5–4.5)
POTASSIUM BLD-SCNC: 4.2 MMOL/L (ref 3.4–5.3)
SODIUM SERPL-SCNC: 136 MMOL/L (ref 133–144)

## 2022-09-26 PROCEDURE — 250N000013 HC RX MED GY IP 250 OP 250 PS 637: Performed by: HOSPITALIST

## 2022-09-26 PROCEDURE — 250N000013 HC RX MED GY IP 250 OP 250 PS 637: Performed by: INTERNAL MEDICINE

## 2022-09-26 PROCEDURE — 99232 SBSQ HOSP IP/OBS MODERATE 35: CPT | Performed by: HOSPITALIST

## 2022-09-26 PROCEDURE — 83735 ASSAY OF MAGNESIUM: CPT | Performed by: HOSPITALIST

## 2022-09-26 PROCEDURE — 80048 BASIC METABOLIC PNL TOTAL CA: CPT | Performed by: HOSPITALIST

## 2022-09-26 PROCEDURE — 250N000013 HC RX MED GY IP 250 OP 250 PS 637

## 2022-09-26 PROCEDURE — 97535 SELF CARE MNGMENT TRAINING: CPT | Mod: GO

## 2022-09-26 PROCEDURE — 92526 ORAL FUNCTION THERAPY: CPT | Mod: GN | Performed by: SPEECH-LANGUAGE PATHOLOGIST

## 2022-09-26 PROCEDURE — 120N000001 HC R&B MED SURG/OB

## 2022-09-26 PROCEDURE — 36415 COLL VENOUS BLD VENIPUNCTURE: CPT | Performed by: HOSPITALIST

## 2022-09-26 PROCEDURE — 97530 THERAPEUTIC ACTIVITIES: CPT | Mod: GP | Performed by: PHYSICAL THERAPIST

## 2022-09-26 PROCEDURE — 84100 ASSAY OF PHOSPHORUS: CPT | Performed by: HOSPITALIST

## 2022-09-26 RX ADMIN — Medication 1 PACKET: at 09:27

## 2022-09-26 RX ADMIN — Medication 1 DROP: at 17:41

## 2022-09-26 RX ADMIN — Medication 3 MG: at 21:28

## 2022-09-26 RX ADMIN — QUETIAPINE FUMARATE 50 MG: 50 TABLET ORAL at 22:08

## 2022-09-26 RX ADMIN — TRAMADOL HYDROCHLORIDE 25 MG: 50 TABLET ORAL at 08:30

## 2022-09-26 RX ADMIN — ACETAMINOPHEN 650 MG: 325 TABLET, FILM COATED ORAL at 21:28

## 2022-09-26 RX ADMIN — Medication 1 PACKET: at 17:41

## 2022-09-26 RX ADMIN — ACETAMINOPHEN 650 MG: 325 TABLET, FILM COATED ORAL at 17:41

## 2022-09-26 RX ADMIN — OXCARBAZEPINE 450 MG: 300 SUSPENSION ORAL at 21:28

## 2022-09-26 RX ADMIN — MIRTAZAPINE 15 MG: 15 TABLET, FILM COATED ORAL at 21:29

## 2022-09-26 RX ADMIN — Medication 1 PACKET: at 12:59

## 2022-09-26 RX ADMIN — LEVETIRACETAM 1000 MG: 100 SOLUTION ORAL at 20:08

## 2022-09-26 RX ADMIN — NORTRIPTYLINE HYDROCHLORIDE 25 MG: 25 CAPSULE ORAL at 20:08

## 2022-09-26 RX ADMIN — QUETIAPINE FUMARATE 25 MG: 25 TABLET ORAL at 16:02

## 2022-09-26 RX ADMIN — Medication 250 MG: at 20:08

## 2022-09-26 RX ADMIN — ACETAMINOPHEN 650 MG: 325 TABLET, FILM COATED ORAL at 00:04

## 2022-09-26 RX ADMIN — Medication 40 MG: at 08:11

## 2022-09-26 RX ADMIN — QUETIAPINE 12.5 MG: 25 TABLET, FILM COATED ORAL at 13:53

## 2022-09-26 RX ADMIN — ACETAMINOPHEN 650 MG: 325 TABLET, FILM COATED ORAL at 04:10

## 2022-09-26 RX ADMIN — MICONAZOLE NITRATE: 20 CREAM TOPICAL at 20:09

## 2022-09-26 RX ADMIN — MICONAZOLE NITRATE: 20 CREAM TOPICAL at 08:26

## 2022-09-26 RX ADMIN — LIDOCAINE 1 PATCH: 560 PATCH PERCUTANEOUS; TOPICAL; TRANSDERMAL at 08:17

## 2022-09-26 RX ADMIN — LEVETIRACETAM 1000 MG: 100 SOLUTION ORAL at 08:10

## 2022-09-26 RX ADMIN — QUETIAPINE FUMARATE 25 MG: 25 TABLET ORAL at 08:11

## 2022-09-26 RX ADMIN — ACETAMINOPHEN 650 MG: 325 TABLET, FILM COATED ORAL at 11:27

## 2022-09-26 RX ADMIN — NORTRIPTYLINE HYDROCHLORIDE 25 MG: 25 CAPSULE ORAL at 08:11

## 2022-09-26 RX ADMIN — CHOLESTYRAMINE 4 G: 4 POWDER, FOR SUSPENSION ORAL at 09:27

## 2022-09-26 RX ADMIN — TOPIRAMATE 50 MG: 50 TABLET ORAL at 21:29

## 2022-09-26 RX ADMIN — TRAMADOL HYDROCHLORIDE 25 MG: 50 TABLET ORAL at 16:01

## 2022-09-26 RX ADMIN — Medication 15 ML: at 08:10

## 2022-09-26 RX ADMIN — Medication 3 MG: at 00:05

## 2022-09-26 RX ADMIN — CHOLESTYRAMINE 4 G: 4 POWDER, FOR SUSPENSION ORAL at 20:08

## 2022-09-26 RX ADMIN — Medication 250 MG: at 08:11

## 2022-09-26 ASSESSMENT — ACTIVITIES OF DAILY LIVING (ADL)
ADLS_ACUITY_SCORE: 38
ADLS_ACUITY_SCORE: 39
ADLS_ACUITY_SCORE: 39
ADLS_ACUITY_SCORE: 38
ADLS_ACUITY_SCORE: 39
ADLS_ACUITY_SCORE: 38
ADLS_ACUITY_SCORE: 39
ADLS_ACUITY_SCORE: 38
ADLS_ACUITY_SCORE: 39
ADLS_ACUITY_SCORE: 39

## 2022-09-26 NOTE — PLAN OF CARE
Goal Outcome Evaluation:  Plan of Care Reviewed With: patient   Overall Patient Progress: no change     Pt here with cerebral abscess. Pt A&O x4, forgetful. VSS, on RA. LS clear. CMS and Neuro's unchanged, intact, except for generalized weakness. Up A1 w/ GB and walker. PEG tube site CDI, patient c/o of discomfort in site area, MD aware/ assessed, continue to monitor. Pain managed with PRN Tylenol, Ultram and ice packs. Cont or urine, adequate output. +BS, BM x2, soft, had one incont episode today. NOC TF 8pm-8am, has q6hr 100mL free water flushes. Soft Bite Size thin liquid diet, fair appetite, takes pills whole with water. PRN Seroquel given x1. Pt scoring green on Aggression Stop Light Tool. TCU at discharge, placement pending.

## 2022-09-26 NOTE — PROGRESS NOTES
CLINICAL NUTRITION SERVICES - REASSESSMENT NOTE      Recommendations Ordered by Registered Dietitian (RD):   Calorie Counts 9/27-9/29 to assess oral intake and need for TF adjustments    Future/Additional Recommendations:   Diet as ordered  Continue meal ordering assistance    Malnutrition: (9/18) - ongoing   % Weight Loss: > 10% in 6 months (severe malnutrition)   % Intake:  Does not meet criteria - pt meeting needs from EN + PO  Subcutaneous Fat Loss:  Orbital region moderate depletion   Muscle Loss:  Temporal region moderate depletion, Clavicle bone region moderate depletion, Acromion bone region moderate-severe depletion and Dorsal hand region severe depletion   Fluid Retention:  None noted     Malnutrition Diagnosis: Severe malnutrition  In Context of:  Acute on Chronic illness or disease       EVALUATION OF PROGRESS TOWARD GOALS   Diet:  Soft and Bite Sized Diet (level 6) + Thin Liquids   Room Service w/ Assist     Supplements: Berry Magic Cup w/ lunch     Intake/Tolerance (oral):    Patient has been consuming 25-50% of meals over the past several days.   Ordering 2-3 meals/day from the kitchen in addition to 1 supplement.     9/25: ~706 kcal (42% needs) and ~32 g PRO (46% needs)    Visited patient at bedside this morning who states her appetite is improving some though has several complaints about food being cold and limited menu options with poor dentition. Likes the magic cup but states it is too warm when she receives it.     Nutrition Support - Enteral:    Type of Feeding Tube: PEG (8/16)  Enteral Frequency:  Cyclic   Enteral Regimen: Vital 1.5 @ 60 mL/hr x 12 hours (8 pm - 8 am) + 1 packet Banatrol TID   Total Enteral Provisions: 1200 kcal, 50 g protein, 165 g CHO, 550 mL free water and 10 g fiber   Free Water Flush: 100 mL q 6 hours (per MD 9/23)  Total Fluid (TF + FWF) = 950 mL/day     Intake/Tolerance (TF):    Tolerating TF at goal - cyclic TF has been meeting ~70% calorie and protein needs.     Lab  monitoring: K+/Mg/Phos WNL  Recent Labs   Lab 09/26/22  0800 09/25/22  1248 09/25/22  0819 09/24/22  2200 09/24/22  1612 09/23/22  2222   * 92 124* 100* 89 99     Meds: cholestyramine BID, remeron at HS, certavite for micronutrients, florastor BID (probiotic), topiramate at HS (can act as appetite inhibitor)   Stool: BM x 1-2 daily over the past 5 days     Weight - Variable with use of bed scale. Admit weight on 7/27 was 106 lb.   Vitals:    09/04/22 0618 09/05/22 0552 09/07/22 0500 09/14/22 0616   Weight: 50.6 kg (111 lb 8.8 oz) 50.3 kg (110 lb 14.3 oz) 50.1 kg (110 lb 7.2 oz) 49 kg (108 lb)    09/20/22 0540   Weight: 58 kg (127 lb 13.9 oz)       ASSESSED NUTRITION NEEDS:  Dosing Weight 48.3 kg (7/27 - admit wt)  Estimated Energy Needs: 4682-7294 kcals (35-40 Kcal/Kg)  Justification: repletion and underweight  Estimated Protein Needs: 70-95 grams protein (1.5-2 g pro/Kg)  Justification: repletion   Estimated Fluid Needs: 1 mL/Kcal for maintenance or per provider pending fluid status       NEW FINDINGS:   Discharge pending TCU placement     Previous Goals:   EN + po intake to meet % estimated needs  Evaluation: Met    Previous Nutrition Diagnosis:   Inadequate oral intake related to poor appetite and dislike of hospital food as evidenced by pt eating 25% meals and still requiring EN to meet nutrition needs  Evaluation: Improving, but ongoing - updated below       CURRENT NUTRITION DIAGNOSIS  Inadequate oral intake related to poor appetite and dislike of hospital food as evidenced by pt eating 25-50% meals and still requiring EN to meet nutrition needs     INTERVENTIONS  Recommendations / Nutrition Prescription  Calorie Counts 9/27-9/29 to assess oral intake and need for TF adjustments   Diet as ordered  Continue meal ordering assistance     Implementation  Start Calorie Counts     Goals  EN + PO intake to meet % nutrition needs vs PO intake >/= 60% to justify wean of TF       MONITORING AND  EVALUATION:  Progress towards goals will be monitored and evaluated per protocol and Practice Guidelines      Aleena Stoddard RD, LD

## 2022-09-26 NOTE — PROGRESS NOTES
[facility]  Hospitalist Progress Note   09/26/2022          Assessment and Plan:       Julisa Chaney is a 77 year old female with trigeminal neuralgia transferred to [facility] from [facility] on 7/27/2022 for neurosurgical evaluation.    Admitted to [facility] on 7/19 for severe sepsis due to COVID-19 infection and bacterial pneumonia. Treated with 5 days of remdesivir, did not require any steroids and IV vancomycin and IV meropenem.  Hospital course complicated by acute metabolic encephalopathy. CT head ( 7/25 ) showed possible left frontal mass causing vasogenic edema. MRI (7/27) showed possible left intracerebral abscess with ventriculitis versus neoplasm.  Switched to IV vancomycin, cefepime and metronidazole. Transferred to [facility] for neurosurgery assessment.     Neurosurgery/neurology/infectious disease/oncology followed. Underwent (7/31) left frontal ventriculostomy.  Developed C. difficile while on antibiotics. Mental status slowly improving.  Hospital course prolonged with discharge disposition.     Status post Severe sepsis secondary to ventriculitis with left lateral ventricle abscess. Resolved  S/p left frontal ventriculostomy 7/31/22 with subsequent EVD removal after 8 days  MRI brain 7/30 showed ventriculitis with probable abscess.   * Flow cytometry did not show any evidence of malignancy.  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 The left temporal horn remains slightly dilated but no samuel hydrocephalus. No acute changes.  - Remains afebrile; leukocytosis resolved ; was initially on vancomycin, cefepime, and metronidazole, then Meropenem and Vancomycin; ID signed off, completed 6 weeks treatment on 9/8/22.  - Progress has been slow and fairly minimal. Concern about overall prognosis. Briefly discussed palliative care with patient's daughter on 9/6/22. She requested re-evaluation from psychiatry regarding depression (completed 9/8/22, see consult note). Seems to want to see how she does with the medication prior to having any discussion about palliative care.  * Appreciate neurosurgery, oncology, ID, psychiatry assistance.    Acute infectious encephalopathy due to above.  Resolved   Chronic pain, likely exacerbated by immobility.   Possible mild cognitive impairment.  Mentation much improved since admission; fluctuating at times, will not engage in conversation most of the times.  Continue to treat underlying issues as noted above  Tylenol PRN. Continue Lidoderm patches for back. PRN ultram to 25 mg q8h. minimize narcotic use as able to.  Maintain normal sleep/wake cycle as able.  Neurocognitive assessment as outpatient.  OT for cognitive screening requested given prolonged hospital stay.    Suicidal thoughts/agitation  Depression, YUE   With improvement in her mentation, she has started becoming at times restless and agitated.   On 8/27, reported having suicidal thoughts. No plans/attempts. Initiated suicide precautions and sitter, evaluated by psychiatry service, discontinued.   Anti depressant not recommended currently in the setting of delirium/encephalopathy.  HS seroquel increased to 50 mg 8/31  Seroquel 25 mg BID and PRN Seroquel   Continue Remeron to 15 mg  Continue PTA nortriptyline (9/24)  Reconsult psychiatry if continues to have issues with anxiety     C. difficile colitis - treated   Patient was noted to have  copious diarrhea on 8/1, positive for C. Difficile. Rectal tube came off 8/28.  Completed treatment with oral vancomycin.  Continue questran and probiotic.  On enteric precautions, will await ID input on isolation.    Acute on chronic anemia  Prior labs show baseline hemoglobin of about 9-10.   Hb on admission was 11.7, slowly trended down, got 1 unit PRBC on 8/8/22 for Hb 6.9  Received IV iron infusion x 3 with first on 8/11/22.  Iron saturation 6. Vitamin B12 - high and folate was normal.    Hemoglobin stable around 10 over the last couple of weeks.  Monitor hgb periodically     History of trigeminal neuralgia  Continue PTA Trileptal , Keppra and Topamax.    History of chronic pyloric ulcer  EGD 2020 which showed gastroduodenitis with a duodenal stricture.  Continue PPI.     Hyperglycemia, resolved  Recent HbA1c was 5.6 on 7/19/22.  PTA not on meds.  Discontinued sliding scale insulin.  Monitor sugars twice daily    Intermittent Hypokalemia, Hypophosphatemia  Replacement per protocol     Hypernatremia. Resolved  Had hypernatremia, was started on free water flushes.  Had hyponatremia with sodium of 132.  Decrease free water flushes 200 mL every 6 hours and sodium improved to 137.  Monitor serum sodium periodically     Severe dysphagia  Severe malnutrition in the setting of acute on chronic medical disease.  Nutrition following.  On tube feeding at night time.  Soft diet.    Physical deconditioning  Will need TCU placement; declined by LTACH  PT/OT re-consulted as patient now agreeable.  Patient has been cooperative with them and they are recommending TCU   SW following for disposition.  Resent referrals as patient now participating with therapy     Contact/enteric precautions.  History of MRSA, Recovered COVID, C-difficile     Orders Placed This Encounter      Combination Diet Soft and Bite Sized Diet (level 6); Thin Liquids (level 0) (pt prefers straws)      DVT Prophylaxis:  SCD, ambulate   Code Status: Full  Code  Disposition: Expected discharge pending safe discharge plan in place    Discussed with patient, floor RN.  More than 70% of time spent in direct patient care, care coordination, patient counseling, and formalizing plan of care.     Antonio Mc MD        Interval History:        Patient lying in bed.  Awake oriented.  Tolerating bite-size diet, tube feeds.    Denies any chest pain or palpitations.  No nausea vomiting.  No headache or dizziness.  Per report assistance of 1 with gait belt and walker for minimal ambulation.  Receiving tube feeds overnight.  Complaining of discomfort at the feeding tube         Physical Exam:        Physical Exam   Temp:  [96.5  F (35.8  C)-97.9  F (36.6  C)] 97.9  F (36.6  C)  Pulse:  [67-85] 70  Resp:  [14-18] 17  BP: (101-152)/(48-72) 101/48  SpO2:  [95 %-98 %] 95 %    Intake/Output Summary (Last 24 hours) at 9/22/2022 0849  Last data filed at 9/21/2022 1916  Gross per 24 hour   Intake 240 ml   Output --   Net 240 ml       Admission Weight: 48.3 kg (106 lb 7.7 oz)  Current Weight: 58 kg (127 lb 13.9 oz)    PHYSICAL EXAM  GENERAL: Patient in no distress.   LUNGS:Respirations unlabored  ABDOMEN: Soft, bowel sounds heard.  Feeding tube in place.  No erythema, no discharge.  No guarding or rigidity.  NEURO: Moving all extremities.  EXTREMITIES: No pedal edema.   SKIN: Warm, dry. No rash  PSYCHIATRY Cooperative       Medications:          banatrol plus  1 packet Per Feeding Tube TID w/meals     cholestyramine  1 packet Oral BID     levETIRAcetam  1,000 mg Oral or Feeding Tube Q12H     lidocaine  1 patch Transdermal Q24H     lidocaine   Transdermal Q8H MARCIA     miconazole with skin protectant   Topical BID     mirtazapine  15 mg Oral or Feeding Tube At Bedtime     multivitamins w/minerals  15 mL Per Feeding Tube Daily     nortriptyline  25 mg Oral BID     OXcarbazepine  450 mg Oral or Feeding Tube At Bedtime     pantoprazole  40 mg Oral or Feeding Tube QAM AC     QUEtiapine  25  mg Oral BID     QUEtiapine  50 mg Oral At Bedtime     saccharomyces boulardii  250 mg Per Feeding Tube BID     sodium chloride (PF)  10-40 mL Intracatheter Q8H     sodium chloride (PF)  10-40 mL Intracatheter Q7 Days     topiramate  50 mg Oral or Feeding Tube At Bedtime     [DISCONTINUED] acetaminophen **OR** acetaminophen, acetaminophen, artificial tears, glucose **OR** dextrose **OR** glucagon, guaiFENesin, ipratropium - albuterol 0.5 mg/2.5 mg/3 mL, melatonin, menthol-zinc oxide, metoprolol, miconazole with skin protectant, naloxone **OR** naloxone **OR** naloxone **OR** naloxone, ondansetron **OR** ondansetron, prochlorperazine **OR** prochlorperazine **OR** prochlorperazine, QUEtiapine, sodium chloride (PF), traMADol         Data:      All new lab and imaging data was reviewed.

## 2022-09-26 NOTE — PLAN OF CARE
Goal Outcome Evaluation:    Plan of Care Reviewed With: patient     Overall Patient Progress: improving    Outcome Evaluation: Appetite and oral intake beginning to improve. Continues on cyclic TF at night. Plan start calorie counts 9/27-9/29 to assess oral intake and need for TF adjustments.    Aleena Stoddard RD, LD

## 2022-09-27 ENCOUNTER — APPOINTMENT (OUTPATIENT)
Dept: SPEECH THERAPY | Facility: CLINIC | Age: 77
DRG: 023 | End: 2022-09-27
Attending: INTERNAL MEDICINE
Payer: COMMERCIAL

## 2022-09-27 ENCOUNTER — APPOINTMENT (OUTPATIENT)
Dept: PHYSICAL THERAPY | Facility: CLINIC | Age: 77
DRG: 023 | End: 2022-09-27
Attending: INTERNAL MEDICINE
Payer: COMMERCIAL

## 2022-09-27 ENCOUNTER — APPOINTMENT (OUTPATIENT)
Dept: OCCUPATIONAL THERAPY | Facility: CLINIC | Age: 77
DRG: 023 | End: 2022-09-27
Attending: INTERNAL MEDICINE
Payer: COMMERCIAL

## 2022-09-27 PROCEDURE — 250N000013 HC RX MED GY IP 250 OP 250 PS 637: Performed by: HOSPITALIST

## 2022-09-27 PROCEDURE — 97535 SELF CARE MNGMENT TRAINING: CPT | Mod: GO

## 2022-09-27 PROCEDURE — 97530 THERAPEUTIC ACTIVITIES: CPT | Mod: GP

## 2022-09-27 PROCEDURE — 99232 SBSQ HOSP IP/OBS MODERATE 35: CPT | Performed by: HOSPITALIST

## 2022-09-27 PROCEDURE — 92526 ORAL FUNCTION THERAPY: CPT | Mod: GN | Performed by: SPEECH-LANGUAGE PATHOLOGIST

## 2022-09-27 PROCEDURE — 250N000013 HC RX MED GY IP 250 OP 250 PS 637: Performed by: INTERNAL MEDICINE

## 2022-09-27 PROCEDURE — 120N000001 HC R&B MED SURG/OB

## 2022-09-27 PROCEDURE — 97110 THERAPEUTIC EXERCISES: CPT | Mod: GO

## 2022-09-27 PROCEDURE — 250N000013 HC RX MED GY IP 250 OP 250 PS 637

## 2022-09-27 PROCEDURE — 97110 THERAPEUTIC EXERCISES: CPT | Mod: GP

## 2022-09-27 RX ADMIN — LEVETIRACETAM 1000 MG: 100 SOLUTION ORAL at 08:02

## 2022-09-27 RX ADMIN — TRAMADOL HYDROCHLORIDE 25 MG: 50 TABLET ORAL at 19:44

## 2022-09-27 RX ADMIN — Medication 250 MG: at 08:01

## 2022-09-27 RX ADMIN — Medication 1 DROP: at 08:00

## 2022-09-27 RX ADMIN — OXCARBAZEPINE 450 MG: 300 SUSPENSION ORAL at 21:05

## 2022-09-27 RX ADMIN — Medication 3 MG: at 21:04

## 2022-09-27 RX ADMIN — Medication 1 PACKET: at 13:21

## 2022-09-27 RX ADMIN — Medication 15 ML: at 08:02

## 2022-09-27 RX ADMIN — CHOLESTYRAMINE 4 G: 4 POWDER, FOR SUSPENSION ORAL at 20:28

## 2022-09-27 RX ADMIN — CHOLESTYRAMINE 4 G: 4 POWDER, FOR SUSPENSION ORAL at 08:01

## 2022-09-27 RX ADMIN — QUETIAPINE FUMARATE 50 MG: 50 TABLET ORAL at 21:05

## 2022-09-27 RX ADMIN — MICONAZOLE NITRATE: 20 CREAM TOPICAL at 08:02

## 2022-09-27 RX ADMIN — Medication 40 MG: at 08:02

## 2022-09-27 RX ADMIN — Medication 1 PACKET: at 18:42

## 2022-09-27 RX ADMIN — TOPIRAMATE 50 MG: 50 TABLET ORAL at 21:05

## 2022-09-27 RX ADMIN — LEVETIRACETAM 1000 MG: 100 SOLUTION ORAL at 21:05

## 2022-09-27 RX ADMIN — TRAMADOL HYDROCHLORIDE 25 MG: 50 TABLET ORAL at 11:30

## 2022-09-27 RX ADMIN — Medication 250 MG: at 20:28

## 2022-09-27 RX ADMIN — MICONAZOLE NITRATE: 20 CREAM TOPICAL at 20:33

## 2022-09-27 RX ADMIN — ACETAMINOPHEN 650 MG: 325 TABLET, FILM COATED ORAL at 23:51

## 2022-09-27 RX ADMIN — QUETIAPINE FUMARATE 25 MG: 25 TABLET ORAL at 08:01

## 2022-09-27 RX ADMIN — ACETAMINOPHEN 650 MG: 325 TABLET, FILM COATED ORAL at 14:18

## 2022-09-27 RX ADMIN — TRAMADOL HYDROCHLORIDE 25 MG: 50 TABLET ORAL at 03:16

## 2022-09-27 RX ADMIN — ACETAMINOPHEN 650 MG: 325 TABLET, FILM COATED ORAL at 07:59

## 2022-09-27 RX ADMIN — NORTRIPTYLINE HYDROCHLORIDE 25 MG: 25 CAPSULE ORAL at 08:01

## 2022-09-27 RX ADMIN — LIDOCAINE 1 PATCH: 560 PATCH PERCUTANEOUS; TOPICAL; TRANSDERMAL at 07:58

## 2022-09-27 RX ADMIN — Medication 1 PACKET: at 08:09

## 2022-09-27 RX ADMIN — ACETAMINOPHEN 650 MG: 325 TABLET, FILM COATED ORAL at 18:42

## 2022-09-27 RX ADMIN — NORTRIPTYLINE HYDROCHLORIDE 25 MG: 25 CAPSULE ORAL at 20:28

## 2022-09-27 RX ADMIN — QUETIAPINE 12.5 MG: 25 TABLET, FILM COATED ORAL at 05:09

## 2022-09-27 RX ADMIN — MIRTAZAPINE 15 MG: 15 TABLET, FILM COATED ORAL at 21:04

## 2022-09-27 RX ADMIN — ACETAMINOPHEN 650 MG: 325 TABLET, FILM COATED ORAL at 03:16

## 2022-09-27 RX ADMIN — QUETIAPINE FUMARATE 25 MG: 25 TABLET ORAL at 16:48

## 2022-09-27 ASSESSMENT — ACTIVITIES OF DAILY LIVING (ADL)
ADLS_ACUITY_SCORE: 39

## 2022-09-27 NOTE — PROGRESS NOTES
Care Management Follow Up    Length of Stay (days): 62    Expected Discharge Date: 09/29/2022     Concerns to be Addressed:       Patient plan of care discussed at interdisciplinary rounds: Yes    Anticipated Discharge Disposition: LTACH     Anticipated Discharge Services:    Anticipated Discharge DME:      Patient/family educated on Medicare website which has current facility and service quality ratings:    Education Provided on the Discharge Plan:    Patient/Family in Agreement with the Plan: yes    Referrals Placed by CM/SW: Post Acute Facilities (Submit for insurance authorization)  Private pay costs discussed: Not applicable    Additional Information:  Patient has been improving and participating well in therapy. Writer resent TCU referrals via [facility] for bed availability.     Will continue to follow.    ARLEN Bellamy, LGSW    [facility]

## 2022-09-27 NOTE — PLAN OF CARE
Pt here with brain abscess. No changes this shift. A&O x4,  neuros unchanged. VSS. Soft bite sized diet, with thin liquids. Takes pills whole. PEG tube in place, TF started 8pm as ordered, due to be stopped at 8am. Running at 60ml/hr with Q6h 100 ml FWF. Some urinary incontinence overnight, otherwise pt able to ambulate to bathroom Ax1 GBW. Discharge to TCU pending.

## 2022-09-27 NOTE — PROGRESS NOTES
[facility]  Hospitalist Progress Note   09/27/2022          Assessment and Plan:       Julisa Chaney is a 77 year old female with trigeminal neuralgia transferred to [facility] from [facility] on 7/27/2022 for neurosurgical evaluation.    Admitted to [facility] on 7/19 for severe sepsis due to COVID-19 infection and bacterial pneumonia. Treated with 5 days of remdesivir, did not require any steroids and IV vancomycin and IV meropenem.  Hospital course complicated by acute metabolic encephalopathy. CT head ( 7/25 ) showed possible left frontal mass causing vasogenic edema. MRI (7/27) showed possible left intracerebral abscess with ventriculitis versus neoplasm.  Switched to IV vancomycin, cefepime and metronidazole. Transferred to [facility] for neurosurgery assessment.     Neurosurgery/neurology/infectious disease/oncology followed. Underwent (7/31) left frontal ventriculostomy.  Developed C. difficile while on antibiotics. Mental status slowly improving.  Hospital course prolonged with discharge disposition.     Status post Severe sepsis secondary to ventriculitis with left lateral ventricle abscess. Resolved  S/p left frontal ventriculostomy 7/31/22 with subsequent EVD removal after 8 days  MRI brain 7/30 showed ventriculitis with probable abscess.   * Flow cytometry did not show any evidence of malignancy.  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 The left temporal horn remains slightly dilated but no samuel hydrocephalus. No acute changes.  - Remains afebrile; leukocytosis resolved ; was initially on vancomycin, cefepime, and metronidazole, then Meropenem and Vancomycin; ID signed off, completed 6 weeks treatment on 9/8/22.  - Progress has been slow and fairly minimal. Concern about overall prognosis.  Hospitalist team had briefly discussed palliative care with patient's daughter on 9/6/22.  Restorative care at this time.  * Appreciate neurosurgery, oncology, IDassistance.    Acute infectious encephalopathy due to above.  Resolved   Chronic pain, likely exacerbated by immobility.   Concern for cognitive impairment with slums 18/3  Mentation much improved since admission; fluctuating at times, will not engage in conversation most of the times.  OT -slums assessment 18/30 on 9/26/2022  Continue to treat underlying issues as noted above  Tylenol PRN. Continue Lidoderm patches for back.   Decreased as needed Ultram to 25 mg every 8 hours, wean off narcotics  Maintain normal sleep/wake cycle as able.  Neurocognitive assessment as outpatient.    Suicidal thoughts/agitation  Depression, YUE   With improvement in her mentation, she has started becoming at times restless and agitated.   On 8/27, reported having suicidal thoughts. No plans/attempts. Initiated suicide precautions and sitter, evaluated by psychiatry service, discontinued.   Anti depressant not recommended currently in the setting of delirium/encephalopathy.  HS seroquel increased to 50 mg 8/31  Seroquel 25 mg BID and PRN Seroquel   Continue Remeron to 15 mg  Continue PTA nortriptyline (9/24)  Reconsult psychiatry if continues to have issues with anxiety     C. difficile colitis - treated   Patient was noted to have copious diarrhea on 8/1, positive for C. Difficile. Rectal tube came off 8/28. Completed treatment with oral vancomycin.  Continue questran and probiotic.    Acute on  chronic anemia  Prior labs show baseline hemoglobin of about 9-10.   Hb on admission was 11.7, slowly trended down, got 1 unit PRBC on 8/8/22 for Hb 6.9  Received IV iron infusion x 3 with first on 8/11/22.  Iron saturation 6. Vitamin B12 - high and folate was normal.    Hemoglobin stable around 10 over the last couple of weeks.  Monitor hgb periodically     History of trigeminal neuralgia  Continue PTA Trileptal , Keppra and Topamax.    History of chronic pyloric ulcer  EGD 2020 which showed gastroduodenitis with a duodenal stricture.  Continue PPI.     Hyperglycemia, resolved  Recent HbA1c was 5.6 on 7/19/22.  PTA not on meds.  Discontinued sliding scale insulin.  Monitor sugars twice daily    Intermittent Hypokalemia, Hypophosphatemia  Replacement per protocol     Hypernatremia. Resolved  Had hypernatremia, was started on free water flushes.  Had hyponatremia with sodium of 132.  Decreased free water flushes 200 mL every 6 hours and sodium improved to 137.  Monitor serum sodium periodically     Severe dysphagia  Severe malnutrition in the setting of acute on chronic medical disease.  Nutrition following.  On tube feeding at night time.  Soft diet.    Physical deconditioning  Will need TCU placement; declined by LTACH  PT/OT re-consulted as patient now agreeable.  Patient has been cooperative with them and they are recommending TCU   SW following for disposition.  Resent referrals as patient now participating with therapy     Contact/enteric precautions.  History of MRSA, Recovered COVID, C-difficile     Orders Placed This Encounter      Combination Diet Soft and Bite Sized Diet (level 6); Thin Liquids (level 0) (pt prefers straws)      DVT Prophylaxis:  SCD, ambulate   Code Status: Full Code  Disposition: Expected discharge pending safe discharge plan in place    Discussed with patient, floor RN.  More than 70% of time spent in direct patient care, care coordination, patient counseling, and formalizing plan of  care.     Antonio Mc MD        Interval History:        Patient lying in bed.  Awake oriented.  Tolerating bite-size diet, tube feeds.    Denies any chest pain or palpitations.  No nausea vomiting.  No headache or dizziness.  Per report assistance of 1 with gait belt and walker for minimal ambulation.  Receiving tube feeds overnight.     Continues to complain of discomfort and feeding tube.  Feeding tube site no erythema, no difficulty with flushing or residual.    Patient reports would like to have her pain medication increased.         Physical Exam:        Physical Exam   Temp:  [97.6  F (36.4  C)-98.6  F (37  C)] 97.6  F (36.4  C)  Pulse:  [72-77] 72  Resp:  [16-18] 18  BP: (110-123)/(56-79) 116/56  SpO2:  [96 %-99 %] 96 %    Intake/Output Summary (Last 24 hours) at 9/22/2022 0849  Last data filed at 9/21/2022 1916  Gross per 24 hour   Intake 240 ml   Output --   Net 240 ml       Admission Weight: 48.3 kg (106 lb 7.7 oz)  Current Weight: 58 kg (127 lb 13.9 oz)    PHYSICAL EXAM  GENERAL: Patient in no distress.   LUNGS:Respirations unlabored  ABDOMEN: Soft, bowel sounds heard.  Feeding tube in place.  No erythema.  No erythema, no discharge.  No guarding or rigidity.  NEURO: Moving all extremities.  EXTREMITIES: No pedal edema.   SKIN: Warm, dry. No rash  PSYCHIATRY Cooperative       Medications:          banatrol plus  1 packet Per Feeding Tube TID w/meals     cholestyramine  1 packet Oral BID     levETIRAcetam  1,000 mg Oral or Feeding Tube Q12H     lidocaine  1 patch Transdermal Q24H     lidocaine   Transdermal Q8H MARCIA     miconazole with skin protectant   Topical BID     mirtazapine  15 mg Oral or Feeding Tube At Bedtime     multivitamins w/minerals  15 mL Per Feeding Tube Daily     nortriptyline  25 mg Oral BID     OXcarbazepine  450 mg Oral or Feeding Tube At Bedtime     pantoprazole  40 mg Oral or Feeding Tube QAM AC     QUEtiapine  25 mg Oral BID     QUEtiapine  50 mg Oral At Bedtime      saccharomyces boulardii  250 mg Per Feeding Tube BID     sodium chloride (PF)  10-40 mL Intracatheter Q8H     sodium chloride (PF)  10-40 mL Intracatheter Q7 Days     topiramate  50 mg Oral or Feeding Tube At Bedtime     [DISCONTINUED] acetaminophen **OR** acetaminophen, acetaminophen, artificial tears, glucose **OR** dextrose **OR** glucagon, guaiFENesin, ipratropium - albuterol 0.5 mg/2.5 mg/3 mL, melatonin, menthol-zinc oxide, metoprolol, miconazole with skin protectant, naloxone **OR** naloxone **OR** naloxone **OR** naloxone, ondansetron **OR** ondansetron, prochlorperazine **OR** prochlorperazine **OR** prochlorperazine, QUEtiapine, sodium chloride (PF), traMADol         Data:      All new lab and imaging data was reviewed.

## 2022-09-27 NOTE — PLAN OF CARE
Goal Outcome Evaluation:  Plan of Care Reviewed With: patient   Overall Patient Progress: no change    Pt here with cerebral abscess. Pt A&O x4, forgetful. VSS, on RA. LS clear. CMS and Neuro's unchanged, intact, except for generalized weakness. Up A1 w/ GB and walker. PEG tube site CDI, dressing placed patient c/o of discomfort in site area, MD aware, warm pack applied, effective, continue to monitor. Pain managed with PRN Tylenol, Ultram and ice/warm packs. Cont or urine this shift. +BS, passing flatus, no BM this shift. NOC TF 8pm-8am with a rate of 60mL/hr, has q6hr 100mL free water flushes. Advanced to Easy to Chew thin liquid diet, poor to fair appetite, takes pills whole with water.  Pt scoring green on Aggression Stop Light Tool. TCU at discharge, placement pending.

## 2022-09-28 ENCOUNTER — APPOINTMENT (OUTPATIENT)
Dept: PHYSICAL THERAPY | Facility: CLINIC | Age: 77
DRG: 023 | End: 2022-09-28
Attending: INTERNAL MEDICINE
Payer: COMMERCIAL

## 2022-09-28 ENCOUNTER — APPOINTMENT (OUTPATIENT)
Dept: OCCUPATIONAL THERAPY | Facility: CLINIC | Age: 77
DRG: 023 | End: 2022-09-28
Attending: INTERNAL MEDICINE
Payer: COMMERCIAL

## 2022-09-28 ENCOUNTER — APPOINTMENT (OUTPATIENT)
Dept: GENERAL RADIOLOGY | Facility: CLINIC | Age: 77
DRG: 023 | End: 2022-09-28
Attending: HOSPITALIST
Payer: COMMERCIAL

## 2022-09-28 LAB
PHOSPHATE SERPL-MCNC: 3.9 MG/DL (ref 2.5–4.5)
POTASSIUM BLD-SCNC: 4.1 MMOL/L (ref 3.4–5.3)

## 2022-09-28 PROCEDURE — 97530 THERAPEUTIC ACTIVITIES: CPT | Mod: GP

## 2022-09-28 PROCEDURE — 250N000013 HC RX MED GY IP 250 OP 250 PS 637: Performed by: INTERNAL MEDICINE

## 2022-09-28 PROCEDURE — 36415 COLL VENOUS BLD VENIPUNCTURE: CPT | Performed by: HOSPITALIST

## 2022-09-28 PROCEDURE — 84100 ASSAY OF PHOSPHORUS: CPT | Performed by: HOSPITALIST

## 2022-09-28 PROCEDURE — 250N000013 HC RX MED GY IP 250 OP 250 PS 637

## 2022-09-28 PROCEDURE — 250N000013 HC RX MED GY IP 250 OP 250 PS 637: Performed by: HOSPITALIST

## 2022-09-28 PROCEDURE — 99231 SBSQ HOSP IP/OBS SF/LOW 25: CPT | Performed by: HOSPITALIST

## 2022-09-28 PROCEDURE — 97535 SELF CARE MNGMENT TRAINING: CPT | Mod: GO

## 2022-09-28 PROCEDURE — 120N000001 HC R&B MED SURG/OB

## 2022-09-28 PROCEDURE — 97116 GAIT TRAINING THERAPY: CPT | Mod: GP

## 2022-09-28 PROCEDURE — 74019 RADEX ABDOMEN 2 VIEWS: CPT

## 2022-09-28 PROCEDURE — 84132 ASSAY OF SERUM POTASSIUM: CPT | Performed by: HOSPITALIST

## 2022-09-28 RX ADMIN — TOPIRAMATE 50 MG: 50 TABLET ORAL at 22:01

## 2022-09-28 RX ADMIN — ACETAMINOPHEN 650 MG: 325 TABLET, FILM COATED ORAL at 03:28

## 2022-09-28 RX ADMIN — LEVETIRACETAM 1000 MG: 100 SOLUTION ORAL at 10:24

## 2022-09-28 RX ADMIN — NORTRIPTYLINE HYDROCHLORIDE 25 MG: 25 CAPSULE ORAL at 20:23

## 2022-09-28 RX ADMIN — MIRTAZAPINE 15 MG: 15 TABLET, FILM COATED ORAL at 22:01

## 2022-09-28 RX ADMIN — QUETIAPINE FUMARATE 25 MG: 25 TABLET ORAL at 15:54

## 2022-09-28 RX ADMIN — TRAMADOL HYDROCHLORIDE 25 MG: 50 TABLET ORAL at 23:55

## 2022-09-28 RX ADMIN — ACETAMINOPHEN 650 MG: 325 TABLET, FILM COATED ORAL at 19:01

## 2022-09-28 RX ADMIN — MICONAZOLE NITRATE: 20 CREAM TOPICAL at 20:24

## 2022-09-28 RX ADMIN — Medication 1 PACKET: at 18:56

## 2022-09-28 RX ADMIN — TRAMADOL HYDROCHLORIDE 25 MG: 50 TABLET ORAL at 03:28

## 2022-09-28 RX ADMIN — TRAMADOL HYDROCHLORIDE 25 MG: 50 TABLET ORAL at 07:58

## 2022-09-28 RX ADMIN — Medication 250 MG: at 20:23

## 2022-09-28 RX ADMIN — TRAMADOL HYDROCHLORIDE 25 MG: 50 TABLET ORAL at 15:54

## 2022-09-28 RX ADMIN — LEVETIRACETAM 1000 MG: 100 SOLUTION ORAL at 20:23

## 2022-09-28 RX ADMIN — QUETIAPINE FUMARATE 50 MG: 50 TABLET ORAL at 20:24

## 2022-09-28 RX ADMIN — OXCARBAZEPINE 450 MG: 300 SUSPENSION ORAL at 22:02

## 2022-09-28 RX ADMIN — NORTRIPTYLINE HYDROCHLORIDE 25 MG: 25 CAPSULE ORAL at 10:24

## 2022-09-28 RX ADMIN — QUETIAPINE FUMARATE 25 MG: 25 TABLET ORAL at 10:24

## 2022-09-28 RX ADMIN — CHOLESTYRAMINE 4 G: 4 POWDER, FOR SUSPENSION ORAL at 16:07

## 2022-09-28 RX ADMIN — Medication 40 MG: at 10:24

## 2022-09-28 RX ADMIN — Medication 1 PACKET: at 12:37

## 2022-09-28 RX ADMIN — Medication 250 MG: at 10:24

## 2022-09-28 RX ADMIN — Medication 15 ML: at 10:25

## 2022-09-28 RX ADMIN — ACETAMINOPHEN 650 MG: 325 TABLET, FILM COATED ORAL at 12:31

## 2022-09-28 RX ADMIN — ACETAMINOPHEN 650 MG: 325 TABLET, FILM COATED ORAL at 23:55

## 2022-09-28 RX ADMIN — Medication 3 MG: at 22:01

## 2022-09-28 ASSESSMENT — ACTIVITIES OF DAILY LIVING (ADL)
ADLS_ACUITY_SCORE: 39

## 2022-09-28 NOTE — PLAN OF CARE
Goal Outcome Evaluation:  A and O x4. HA pain rated 8/10, taking prn ultram and Tylenol. Also reporting discomfort in LUQ at PEG site. PEG clamped, flushed with 100 mL free water at 1845 with plan for overnight feeding. Up with 1, belt, walker; needs encouragement to get OOB. Anxious at times. Fair appetite at dinner, on calorie count. Continent of bladder but reports an incontinent stool now at change of shift. Pt awaiting possible TCU placement. Scoring green on aggression screening tool.

## 2022-09-28 NOTE — PLAN OF CARE
/62 (BP Location: Left arm, Patient Position: Semi-Potter's, Cuff Size: Adult Small)   Pulse 83   Temp 96.9  F (36.1  C) (Axillary)   Resp 16   Wt 54.6 kg (120 lb 5.9 oz)   SpO2 98%   BMI 20.03 kg/m      Patient name: Julisa Chaney    Nursing shift note  Summary: Patient in with brain absess  Alertness/orientation: Alert, oriented x4 but forgetful  Neuro: No focal deficits, reports headache  Cardiac: WDL  Resp: WDL  GI: Had BM on toliet  : Voiding indepentently  IV: No IV  Mobility: A1 gait belt walker  CMS: Intact  Pain: 9/10 headache  Diet: Easy to chew  Takes meds: Whole  Skin: Blanchable redness on coccyx  Plan: Discharge to TCU  Other Important Info: Patient much improved since I last had her 2-3 weeks ago.      Ed Salvador, RN  [facility] [address] [facility]  [phone]

## 2022-09-28 NOTE — PROGRESS NOTES
CALORIE COUNT      Approximate Oral Intake for:  (9/27 - 3 meals)    Calories: 1204 cals  Protein: 34 gm    TF via PEG - Vital 1.5 @ 60 mL/hr x 12 hrs (8pm-8am) + 1 packet Banatrol TID = 1200 kcal, 50 g protein, 165 g CHO, 550 mL free water and 10 g fiber    TOTAL (po + TF) = 2404 cals (125% needs), 84 gm pro (100% needs)        Estimated Needs:    Dosing Weight 48.3 kg (7/27 - admit wt)  Estimated Energy Needs: 5739-6254 kcals (35-40 Kcal/Kg)  Estimated Protein Needs: 70-95 grams protein (1.5-2 g pro/Kg)      Summary:   Diet: Easy to Chew (L7), Thin Liquids + Magic Cup with lunch  Tolerating TF  BM x2  Continue calorie count    Nadeen Candelario RD, LD  Clinical Dietitian - [facility]   Pager - [phone]

## 2022-09-28 NOTE — PLAN OF CARE
Goal Outcome Evaluation:    Plan of Care Reviewed With: patient     Overall Patient Progress: no change    Reason for Admission: Cerebral Abscess     Cognitive/Mentation: A/Ox 4. Forgetful at times.  Neuros/CMS: Intact ex generalized weakness.   VS: VSS. SBP goal <180.  GI: BS active, last BM 9/27/2022. Incontinent at times.   : Rash in perineum, barrier cream applied. Incontinent at times.  Pulmonary: LS clear.  Pain: Head pain, administered PRN tylenol and PRN tramadol.      Drains/Lines: no IV access.  Skin: Sacrum/Coccyx and perineum redness and redness/rash- skin barrier cream applied.  Activity: Assist x 1 with GB/W.   Diet: Easy to chew diet and thin liquids. Raymond count. Takes pills whole or in PEG tube- PEG running Vital 1.5 @60ml/hr from 8pm-8am, 100ml free water flush Q6h.      Therapies recs: TCU  Discharge: Pending     Aggression Stoplight Tool: GREEN     End of shift summary: Contact isolation maintained r/t (+) MRSA. Patient anxious at bedtime, received scheduled Seroquel and PRN melatonin.

## 2022-09-28 NOTE — PROGRESS NOTES
[facility]  Hospitalist Progress Note   09/28/2022          Assessment and Plan:       Julisa Chaney is a 77 year old female with trigeminal neuralgia transferred to [facility] from [facility] on 7/27/2022 for neurosurgical evaluation.    Admitted to [facility] on 7/19 for severe sepsis due to COVID-19 infection and bacterial pneumonia. Treated with 5 days of remdesivir, did not require any steroids and IV vancomycin and IV meropenem.  Hospital course complicated by acute metabolic encephalopathy. CT head ( 7/25 ) showed possible left frontal mass causing vasogenic edema. MRI (7/27) showed possible left intracerebral abscess with ventriculitis versus neoplasm.  Switched to IV vancomycin, cefepime and metronidazole. Transferred to [facility] for neurosurgery assessment.     Neurosurgery/neurology/infectious disease/oncology followed. Underwent (7/31) left frontal ventriculostomy.  Developed C. difficile while on antibiotics. Mental status slowly improving.  Hospital course prolonged with discharge disposition.     Status post Severe sepsis secondary to ventriculitis with left lateral ventricle abscess. Resolved  S/p left frontal ventriculostomy 7/31/22 with subsequent EVD removal after 8 days  MRI brain 7/30 showed ventriculitis with probable abscess.   * Flow cytometry did not show any evidence of malignancy.  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 The left temporal horn remains slightly dilated but no samuel hydrocephalus. No acute changes.  - Remains afebrile; leukocytosis resolved ; was initially on vancomycin, cefepime, and metronidazole, then Meropenem and Vancomycin; ID signed off, completed 6 weeks treatment on 9/8/22.  - Progress has been slow and fairly minimal. Concern about overall prognosis.  Hospitalist team had briefly discussed palliative care with patient's daughter on 9/6/22.  Restorative care at this time.  * Appreciate neurosurgery, oncology, IDassistance.    Acute infectious encephalopathy due to above.  Resolved   Chronic pain, likely exacerbated by immobility.   Concern for cognitive impairment with slums 18/3  Mentation much improved since admission; fluctuating at times, will not engage in conversation most of the times.  OT - Slums assessment 18/30 on 9/26/2022  Continue to treat underlying issues as noted above  Tylenol PRN. Continue Lidoderm patches for back.   Decreased as needed Ultram to 25 mg every 8 hours, wean off narcotics  Maintain normal sleep/wake cycle as able.  Neurocognitive assessment as outpatient.    Suicidal thoughts/agitation  Depression, YUE   With improvement in her mentation, she has started becoming at times restless and agitated.   On 8/27, reported having suicidal thoughts. No plans/attempts. Initiated suicide precautions and sitter, evaluated by psychiatry service, discontinued.   Anti depressant not recommended currently in the setting of delirium/encephalopathy.  HS seroquel increased to 50 mg 8/31  Seroquel 25 mg BID and PRN Seroquel   Continue Remeron to 15 mg  Continue PTA nortriptyline (9/24)  Reconsult psychiatry if continues to have issues with anxiety     C. difficile colitis - treated   Patient was noted to have copious diarrhea on 8/1, positive for C. Difficile. Rectal tube came off 8/28. Completed treatment with oral vancomycin.  Continue questran and probiotic.    Acute on  chronic anemia  Prior labs show baseline hemoglobin of about 9-10.   Hb on admission was 11.7, slowly trended down, got 1 unit PRBC on 8/8/22 for Hb 6.9  Received IV iron infusion x 3 with first on 8/11/22.  Iron saturation 6. Vitamin B12 - high and folate was normal.    Hemoglobin stable around 10 over the last couple of weeks.  Monitor hgb periodically     History of trigeminal neuralgia  Continue PTA Trileptal , Keppra and Topamax.    History of chronic pyloric ulcer  EGD 2020 which showed gastroduodenitis with a duodenal stricture.  Continue PPI.     Hyperglycemia, resolved  Recent HbA1c was 5.6 on 7/19/22.  PTA not on meds.  Discontinued sliding scale insulin.  Monitor sugars twice daily    Intermittent Hypokalemia, Hypophosphatemia  Replacement per protocol     Hypernatremia. Resolved  Had hypernatremia, was started on free water flushes.  Had hyponatremia with sodium of 132.  Decreased free water flushes 200 mL every 6 hours and sodium improved to 137.  Monitor serum sodium periodically     Severe dysphagia  Severe malnutrition in the setting of acute on chronic medical disease.  Nutrition following.  On tube feeding at night time.  Tolerating Soft diet.  Continues to complain of discomfort and feeding tube.  Feeding tube site no erythema, no difficulty with flushing or residual.    Xray abdomen reviewed PEG tube projects within the stomach in these views. Small amount of stool. No free air.    Physical deconditioning  Will need TCU placement; declined by LTACH  PT/OT re-consulted as patient now agreeable.  Patient has been cooperative with them and they are recommending TCU   SW following for disposition.    Contact/enteric precautions.  History of MRSA, Recovered COVID, C-difficile     Orders Placed This Encounter      Combination Diet Easy to Chew (level 7); Thin Liquids (level 0) (pt prefers straws)      DVT Prophylaxis:  SCD, ambulate   Code Status: Full Code  Disposition: Expected discharge pending safe  discharge plan in place    Discussed with patient, floor RN.  More than 70% of time spent in direct patient care, care coordination, patient counseling, and formalizing plan of care.     Antonio Mc MD        Interval History:        Patient lying in bed.  Awake oriented.  Tolerating bite-size diet, tube feeds.    Denies any chest pain or palpitations.  No nausea vomiting.  No headache or dizziness.  Per report assistance of 1 with gait belt and walker for minimal ambulation.  Receiving tube feeds overnight.     Continues to complain of discomfort and feeding tube.  Feeding tube site no erythema, no difficulty with flushing or residual.    Patient reports would like to have her pain medication increased.         Physical Exam:        Physical Exam   Temp:  [96.9  F (36.1  C)-98.1  F (36.7  C)] 96.9  F (36.1  C)  Pulse:  [70-83] 83  Resp:  [16] 16  BP: (103-128)/(50-64) 123/62  SpO2:  [97 %-99 %] 98 %    Intake/Output Summary (Last 24 hours) at 9/22/2022 0849  Last data filed at 9/21/2022 1916  Gross per 24 hour   Intake 240 ml   Output --   Net 240 ml       Admission Weight: 48.3 kg (106 lb 7.7 oz)  Current Weight: 58 kg (127 lb 13.9 oz)    PHYSICAL EXAM  GENERAL: Patient in no distress.   LUNGS:Respirations unlabored  ABDOMEN: Soft, bowel sounds heard.  Feeding tube in place.  No erythema.  No erythema, no discharge.  No guarding or rigidity.  NEURO: Moving all extremities.  EXTREMITIES: No pedal edema.   SKIN: Warm, dry. No rash  PSYCHIATRY Cooperative       Medications:          banatrol plus  1 packet Per Feeding Tube TID w/meals     cholestyramine  1 packet Oral BID     levETIRAcetam  1,000 mg Oral or Feeding Tube Q12H     lidocaine  1 patch Transdermal Q24H     lidocaine   Transdermal Q8H MARCIA     miconazole with skin protectant   Topical BID     mirtazapine  15 mg Oral or Feeding Tube At Bedtime     multivitamins w/minerals  15 mL Per Feeding Tube Daily     nortriptyline  25 mg Oral BID     OXcarbazepine   450 mg Oral or Feeding Tube At Bedtime     pantoprazole  40 mg Oral or Feeding Tube QAM AC     QUEtiapine  25 mg Oral BID     QUEtiapine  50 mg Oral At Bedtime     saccharomyces boulardii  250 mg Per Feeding Tube BID     sodium chloride (PF)  10-40 mL Intracatheter Q8H     sodium chloride (PF)  10-40 mL Intracatheter Q7 Days     topiramate  50 mg Oral or Feeding Tube At Bedtime     [DISCONTINUED] acetaminophen **OR** acetaminophen, acetaminophen, artificial tears, glucose **OR** dextrose **OR** glucagon, guaiFENesin, ipratropium - albuterol 0.5 mg/2.5 mg/3 mL, melatonin, menthol-zinc oxide, metoprolol, miconazole with skin protectant, naloxone **OR** naloxone **OR** naloxone **OR** naloxone, ondansetron **OR** ondansetron, prochlorperazine **OR** prochlorperazine **OR** prochlorperazine, QUEtiapine, sodium chloride (PF), traMADol         Data:      All new lab and imaging data was reviewed.

## 2022-09-28 NOTE — PLAN OF CARE
Reason for Admission: Cerebral abscess   Cognitive/Mentation: A/Ox 4, anxious   Neuros/CMS: Stable with generalized weakness and baseline BLE numbness.   VS: VSS on RA   Tele: N/A  GI: BS audible, + flatus, no BM this shift. Continent.   : Voiding adequately. Continent overnight.   Pulmonary: LS clear  Pain: Headache and back pain managed with ice/hot packs and prn tramadol and tylenol.   Drains/Lines: No IV access. PEG tube infusing TF overnight.   Skin: Intact, redness in perineum/buttocks area. Skin barrier applied.   Activity: Assist x 1 with GB/W.  Diet: Easy to chew diet with thin liquids. Calorie count. Takes pills whole. TF running from  8pm-8am at 60 mL/hr with q6hr 100 mL flushes.   Therapies recs: TCU  Discharge: Pending placement   Aggression Stoplight Tool: Green   End of shift summary: No changes this shift. Enteric/contact precautions maintained.

## 2022-09-29 ENCOUNTER — APPOINTMENT (OUTPATIENT)
Dept: OCCUPATIONAL THERAPY | Facility: CLINIC | Age: 77
DRG: 023 | End: 2022-09-29
Attending: INTERNAL MEDICINE
Payer: COMMERCIAL

## 2022-09-29 ENCOUNTER — APPOINTMENT (OUTPATIENT)
Dept: PHYSICAL THERAPY | Facility: CLINIC | Age: 77
DRG: 023 | End: 2022-09-29
Attending: INTERNAL MEDICINE
Payer: COMMERCIAL

## 2022-09-29 LAB
FOLATE SERPL-MCNC: 12.1 NG/ML (ref 4.6–34.8)
POTASSIUM BLD-SCNC: 3.7 MMOL/L (ref 3.4–5.3)
VIT B12 SERPL-MCNC: 1069 PG/ML (ref 232–1245)

## 2022-09-29 PROCEDURE — 250N000013 HC RX MED GY IP 250 OP 250 PS 637: Performed by: INTERNAL MEDICINE

## 2022-09-29 PROCEDURE — 120N000001 HC R&B MED SURG/OB

## 2022-09-29 PROCEDURE — 36415 COLL VENOUS BLD VENIPUNCTURE: CPT | Performed by: HOSPITALIST

## 2022-09-29 PROCEDURE — 97530 THERAPEUTIC ACTIVITIES: CPT | Mod: GP | Performed by: PHYSICAL THERAPIST

## 2022-09-29 PROCEDURE — 36415 COLL VENOUS BLD VENIPUNCTURE: CPT | Performed by: PSYCHIATRY & NEUROLOGY

## 2022-09-29 PROCEDURE — 250N000013 HC RX MED GY IP 250 OP 250 PS 637: Performed by: HOSPITALIST

## 2022-09-29 PROCEDURE — 82607 VITAMIN B-12: CPT | Performed by: PSYCHIATRY & NEUROLOGY

## 2022-09-29 PROCEDURE — 97535 SELF CARE MNGMENT TRAINING: CPT | Mod: GO | Performed by: OCCUPATIONAL THERAPIST

## 2022-09-29 PROCEDURE — 99233 SBSQ HOSP IP/OBS HIGH 50: CPT | Performed by: INTERNAL MEDICINE

## 2022-09-29 PROCEDURE — 82746 ASSAY OF FOLIC ACID SERUM: CPT | Performed by: PSYCHIATRY & NEUROLOGY

## 2022-09-29 PROCEDURE — 84132 ASSAY OF SERUM POTASSIUM: CPT | Performed by: HOSPITALIST

## 2022-09-29 PROCEDURE — 250N000013 HC RX MED GY IP 250 OP 250 PS 637

## 2022-09-29 PROCEDURE — 97110 THERAPEUTIC EXERCISES: CPT | Mod: GP | Performed by: PHYSICAL THERAPIST

## 2022-09-29 RX ADMIN — OXCARBAZEPINE 450 MG: 300 SUSPENSION ORAL at 21:22

## 2022-09-29 RX ADMIN — NORTRIPTYLINE HYDROCHLORIDE 25 MG: 25 CAPSULE ORAL at 08:19

## 2022-09-29 RX ADMIN — TRAMADOL HYDROCHLORIDE 25 MG: 50 TABLET ORAL at 19:41

## 2022-09-29 RX ADMIN — Medication 40 MG: at 08:20

## 2022-09-29 RX ADMIN — LEVETIRACETAM 1000 MG: 100 SOLUTION ORAL at 21:22

## 2022-09-29 RX ADMIN — LIDOCAINE 1 PATCH: 560 PATCH PERCUTANEOUS; TOPICAL; TRANSDERMAL at 08:18

## 2022-09-29 RX ADMIN — LEVETIRACETAM 1000 MG: 100 SOLUTION ORAL at 08:19

## 2022-09-29 RX ADMIN — Medication 250 MG: at 08:19

## 2022-09-29 RX ADMIN — MICONAZOLE NITRATE: 20 CREAM TOPICAL at 09:33

## 2022-09-29 RX ADMIN — TOPIRAMATE 50 MG: 50 TABLET ORAL at 21:22

## 2022-09-29 RX ADMIN — MIRTAZAPINE 15 MG: 15 TABLET, FILM COATED ORAL at 21:22

## 2022-09-29 RX ADMIN — TRAMADOL HYDROCHLORIDE 25 MG: 50 TABLET ORAL at 08:19

## 2022-09-29 RX ADMIN — QUETIAPINE 12.5 MG: 25 TABLET, FILM COATED ORAL at 04:45

## 2022-09-29 RX ADMIN — CHOLESTYRAMINE 4 G: 4 POWDER, FOR SUSPENSION ORAL at 01:21

## 2022-09-29 RX ADMIN — Medication 15 ML: at 08:20

## 2022-09-29 RX ADMIN — ACETAMINOPHEN 650 MG: 325 TABLET, FILM COATED ORAL at 04:46

## 2022-09-29 RX ADMIN — CHOLESTYRAMINE 4 G: 4 POWDER, FOR SUSPENSION ORAL at 08:19

## 2022-09-29 RX ADMIN — MICONAZOLE NITRATE: 20 CREAM TOPICAL at 21:23

## 2022-09-29 RX ADMIN — NORTRIPTYLINE HYDROCHLORIDE 25 MG: 25 CAPSULE ORAL at 21:22

## 2022-09-29 RX ADMIN — QUETIAPINE FUMARATE 25 MG: 25 TABLET ORAL at 16:14

## 2022-09-29 RX ADMIN — QUETIAPINE FUMARATE 50 MG: 50 TABLET ORAL at 21:22

## 2022-09-29 RX ADMIN — ACETAMINOPHEN 650 MG: 325 TABLET, FILM COATED ORAL at 12:35

## 2022-09-29 RX ADMIN — QUETIAPINE FUMARATE 25 MG: 25 TABLET ORAL at 08:19

## 2022-09-29 RX ADMIN — Medication 1 PACKET: at 12:35

## 2022-09-29 RX ADMIN — Medication 1 PACKET: at 19:41

## 2022-09-29 RX ADMIN — ACETAMINOPHEN 650 MG: 325 TABLET, FILM COATED ORAL at 22:42

## 2022-09-29 ASSESSMENT — ACTIVITIES OF DAILY LIVING (ADL)
ADLS_ACUITY_SCORE: 39
ADLS_ACUITY_SCORE: 37
ADLS_ACUITY_SCORE: 39
ADLS_ACUITY_SCORE: 37
ADLS_ACUITY_SCORE: 39

## 2022-09-29 NOTE — PLAN OF CARE
Goal Outcome Evaluation:  Reason for Admission: Cerebral abscess   Cognitive/Mentation: A/Ox 4, anxious   Neuros/CMS: Stable with generalized weakness and baseline BLE numbness.   VS: VSS on RA   Tele: N/A  GI: BS audible, + flatus, no BM this shift. Continent.   : Voiding adequately. Continent overnight.   Pulmonary: LS clear  Pain: Continue to c/o headahce and PEG site pain- PRN tylenol, tramadol and heat packs providing minimal relief. Drains/Lines: No IV access. PEG tube infusing TF overnight 8p-8a.   Skin: Intact, redness in perineum/buttocks area. Otf cream applied.  Activity: Assist x 1 with GB/W.  Diet: Easy to chew diet with thin liquids. Calorie count. Takes pills whole. Therapies recs: TCU  Discharge: Pending placement   Aggression Stoplight Tool: Green   End of shift summary: No changes this shift. Continues to fixate on pain and anxiety. PRN seroquel admin x1. Contact precautions maintained.  Problem: Risk for Delirium  Goal: Improved Sleep  Outcome: Ongoing, Not Progressing

## 2022-09-29 NOTE — PROGRESS NOTES
Care Management Follow Up    Length of Stay (days): 64    Expected Discharge Date: 09/30/2022     Concerns to be Addressed:       Patient plan of care discussed at interdisciplinary rounds: Yes    Anticipated Discharge Disposition: LTACH     Anticipated Discharge Services:    Anticipated Discharge DME:      Patient/family educated on Medicare website which has current facility and service quality ratings:    Education Provided on the Discharge Plan:    Patient/Family in Agreement with the Plan: yes    Referrals Placed by CM/SW: Post Acute Facilities (Submit for insurance authorization)  Private pay costs discussed: Not applicable    Additional Information:  Writer placed call to Randall in admissions at [facility] regarding previous referral. Randall noted patient was declined d/t rectal tube and seizures. Writer clarified/faxed supporting documentation stating patient has not had a rectal tube for ~ month, no longer having seizures, has peg and a diet, and has been participating well in therapy. Patient is currently CCG/SBA and up with assist of 1.   Randall noted he will review updated referral and reach back out to writer.     Addendum 1428-   followed up with admissions at [facility] and spoke with Vonda. Vonda inquired if patient has had continuing SI - writer educated patient has not and that was a concern when she admitted but is no longer. Vonda inquired if patient has had any aggressive behaviors/tried to leave - sanyar confirmed this has not ever been an issue with attempting to leave during her hospitalization. Vonda stated they may not have a pump available for tube feedings. Vonda is going to continue to assess and reach back out to writer.       Sunita Jarvis, ARLEN, LGSW    [facility]

## 2022-09-29 NOTE — PROGRESS NOTES
[facility]    Internal Medicine Hospitalist Progress Note  09/29/2022  I evaluated patient on the above date.    Pablito Medeiros Jr., MD  [phone] (p)  Text Page  Vocera        Assessment & Plan New actions/orders today (09/29/2022) are underlined.    Julisa Chaney is a 77 year old female with history including trigeminal neuralgia, who was initially admitted to [facility] on 7/19/2022 for severe sepsis due to COVID-19 infection and bacterial pneumonia. Treated with 5 days of remdesivir (did not require any steroids) as well as  IV meropenem and IV vancomycin. Hospital course complicated by an acute and prolonged metabolic encephalopathy. Ultimately, head CT 7/25/2022 showed a possible left frontal mass causing vasogenic edema. 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 Antibiotics switched to IV vancomycin, cefepime and metronidazole. Transferred to [facility] 7/27/2022 for neurosurgical assessment.    Underwent left frontal ventriculostomy on 7/31/2022. Developed C. difficile while on antibiotics requiring rectal tube and a prolonged vanco course. Significant dysphagia and s/p g-tube 8/16. Mental status slowly improved. Hospital course prolonged with discharge disposition.       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  * Initial admission at [facility] 7/19 and transfer to [facility] 7/27 as above.  * Continued on broad antibiotics on admit.  * Neurosurgery, Neurology, ID and Oncology consulted.  * EEG 7/26 showed left frontotemporal region slowing with intermittent sharp wave activity; 48 hrs EEG recording showed severe diffuse nonspecific encephalopathy with irritability over the posterior head regions.  * MRI brain 7/30 showed ventriculitis with probable abscess.   * Underwent above surgery on 7/31; cultures no growth.  * Brain, CSF flow cytometry 8/1 did not  show any evidence of malignancy.  * EVD removed 8/8.  * Antibiotics narrowed to meropenem and vancomycin 8/13.  * Head CT 8/19 showed stable presumed vasogenic edema in the white matter of the anterior inferior left frontal lobe related to ventriculitis of the left lateral ventricle; stable ventriculomegaly of the left lateral ventricle.  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  * Given slow improvement, Hospitalist team had briefly discussed palliative care with patient's daughter on 9/6/22; they wanted restorative care at this time.  * Completed 6 weeks IV antibiotics on 9/8.  - Monitor clinically.  - Continue PRN acetaminophen.    Acute infectious and metabolic encephalopathy due to above, resolved.  Concern for cognitive impairment with slums 18/30.  * Initial presentation as above.  * Treatment of brain abscess as above.  * Mentation subsequently much improved overall; however, fluctuating at times and will not engage in conversation most of the times.  * OT - Slums assessment 18/30 on 9/26.  - Continue to other treat issues as noted.  - Re-orient as needed.  - Maintain normal day/night, sleep/wake cycles.  - Minimize sedating medications as able.  - Neurocognitive assessment as outpatient.    Severe dysphagia.  Severe malnutrition related to acute on chronic medical disease.  * Pt with significant dysphagia and severe malnutrition (see Nutrition notes) that required a feeding tube.  * G-tube placed 8/16.  * Diet able to be advanced.  * Pt with persistent complaints of discomfort relating to g-tube.  * AXR 9/28 negative for acute findings.  - Continue dysphagia 7 diet with thin liquids.  - Continue TF's.  - Plan to discuss/consult IR regarding possible g-tube removal  after discussing with pt's family.    C. difficile colitis, treated.  * Patient was noted to have copious diarrhea on 8/1; testing positive for C. Difficile. Started on PO vanco 8/2.  * Required rectal tube.  * Rectal tube was discontinued 8/28.   * Extended course of vancomycin given other antibiotics for brain abscess.  * Completed treatment with oral vancomycin 9/23.  - Continue saccharomyces boulardii probiotic.  - Continue cholestyramine BID.    Acute on chronic anemia, multifactorial due to anemia of chronic disease + iron deficiency.  * Hgb on admission was 11.7.  * Hgb slowly trended down. Iron studies 7/30 suggested anemia of chronic disease + iron deficiency; vitamin B12 and folate not decreased.  * Received 1 unit PRBC on 8/8/22 for Hb 6.9.  * Received IV iron infusion x3 with first on 8/11-8/12.  * Hemoglobin subsequently stable around 10.  - Monitor hgb periodically as needed.     Depression/anxiety with suicidal thoughts and agitation.  * PTA on mirtazapine.  * With improvement in her mentation, she started becoming restless and agitated at times.  * PRN quetiapine started during this hospitalization.  * On 8/27, reported having suicidal thoughts; no plans/attempts. Initiated suicide precautions and sitter; evaluated by Psychiatry service; sitter subsequently discontinued.  * Scheduled quetiapine started 8/29 and subsequently titrated.  - Continue mirtazapine 15 mg at bedtime.  - Continue quetiapine 25 mg BID and 50 mg at bedtime; PRN quetiapine 12.5 mg BID.    Back pain, suspect exacerbated by immobility.   - Continue lidocaine patches, PRN acetaminophen, PRN tramadol; minimize opioids as able.    Trigeminal neuralgia.  * PTA on levetiracetam, nortriptyline, oxcarbazepine, topiramate.  * Levetiracetam increased at [facility] in the setting of brain abscess.  - Continue levetiracetam, nortriptyline, oxcarbazepine, topiramate.  - Ask Neurology to see regarding decreasing levetiracetam from 1000 mg  BID back to 500 mg BID (prior home dose).    GERD.  History of duodenal stricture.  History of pyloric ulcer.  * EGD 2020 showed gastroduodenitis with a duodenal stricture.  - Continue pantoprazole.    Weakness and physical deconditioning due to multiple acute and chronic medical issues.  - Continue PT and OT.  - Plan TCU.       Hyperglycemia, suspect stress induced, resolved.  * HbA1c was 5.6 on 7/19/2022.    * Required ISS this hospitalization, subsequently stopped.    Hypokalemia, hypophosphatemia, resolved.  * Potassium and phosphorus low at times this hospitalization. Treated with replacement.    Hypernatremia, resolved.  Hyponatremia, resolved.  * Had hypernatremia this hospitalization and was started on free water flushes.    * Had hyponatremia and water flushed decreased.  * Sodium subsequently normalized.  - Monitor BMP periodically.        Clinically Significant Risk Factors Present on Admission                        COVID-19 testing.  COVID-19 PCR Results    COVID-19 PCR Results 2/7/22 7/19/22   SARS CoV2 PCR Negative Positive (A)   (A) Abnormal value       Comments are available for some flowsheets but are not being displayed.         COVID-19 Antibody Results, Testing for Immunity    COVID-19 Antibody Results, Testing for Immunity   No data to display.             Diet: Snacks/Supplements Adult: Other; L: mota Magic Cup, PRN sup w/ dinner; With Meals  Adult Formula Drip Feeding: Continuous Vital 1.5; Gastrostomy; Goal Rate: 60 mL/hr x 12 hrs (8pm-8am); mL/hr; Medication - Feeding Tube Flush Frequency: At least 15-30 mL water before and after medication administration and with tube clogging; [place]...  Room Service  Calorie Counts  Combination Diet Easy to Chew (level 7); Thin Liquids (level 0) (pt prefers straws)    Prophylaxis: PCD's, ambulation.   Abrams Catheter: Not present  Central Lines: None  Code Status: Full Code    Disposition Plan   Expected discharge: Recommended to transitional care unit  pending bed availability.  Entered: Pablito Medeiros MD 09/29/2022, 7:36 AM       I spent approximately 50 minutes bedside and on the inpatient unit today managing the care of patient. Over 50% of my time on the unit was spent in extensive chart review, counseling the patient/family and/or coordinating care regarding services listed in this note.    Interval History    C/o pain/discomfort around g-tube site.  Feels dizzy at times.  Otherwise doing OK.    -Data reviewed today: I reviewed all new labs and imaging over the last 24 hours. I personally reviewed no images or EKG's today.    Physical Exam    , Blood pressure 120/61, pulse 76, temperature 97.5  F (36.4  C), temperature source Oral, resp. rate 16, weight 54.6 kg (120 lb 5.9 oz), SpO2 95 %, not currently breastfeeding. O2 Device: None (Room air)    Vitals:    09/14/22 0616 09/20/22 0540 09/28/22 0601   Weight: 49 kg (108 lb) 58 kg (127 lb 13.9 oz) 54.6 kg (120 lb 5.9 oz)     Vital Signs with Ranges  Temp:  [97.2  F (36.2  C)-98  F (36.7  C)] 97.5  F (36.4  C)  Pulse:  [76-80] 76  Resp:  [16-20] 16  BP: (107-120)/(56-64) 120/61  SpO2:  [95 %-96 %] 95 %  Patient Vitals for the past 24 hrs:   BP Temp Temp src Pulse Resp SpO2   09/29/22 0000 120/61 97.5  F (36.4  C) Oral -- 16 95 %   09/28/22 1550 107/57 98  F (36.7  C) Oral 76 18 96 %   09/28/22 1457 112/64 97.2  F (36.2  C) Oral 80 18 96 %   09/28/22 0913 112/56 97.8  F (36.6  C) Axillary 78 20 95 %     I/O's Last 24 hours  I/O last 3 completed shifts:  In: 280 [P.O.:220; NG/GT:60]  Out: -     Constitutional: Awake, alert, pleasant.  Respiratory: Diminished in bases. No crackles or wheezes.  Cardiovascular: RRR, no m/r/g.  GI: Soft, nd, tender, no r/g, +BS.  Skin/Integumen:   Other:        Data   Recent Labs   Lab 09/28/22  0855 09/26/22  0800 09/25/22  1248 09/25/22  0819 09/24/22  1612 09/24/22  0745 09/23/22  1153 09/23/22  0837 09/22/22  1449 09/22/22  1255   WBC  --   --   --   --   --   --   --  7.2   --   --    HGB  --   --   --   --   --   --   --  10.5*  --   --    MCV  --   --   --   --   --   --   --  82  --   --    PLT  --   --   --   --   --   --   --  513*  --   --    NA  --  136  --   --   --  137  --   --   --  132*   POTASSIUM 4.1 4.2  --   --   --   --   --   --   --  4.1  4.1   CHLORIDE  --  104  --   --   --   --   --   --   --  101   CO2  --  24  --   --   --   --   --   --   --  25   BUN  --  18  --   --   --   --   --   --   --  10   CR  --  0.52  --   --   --   --   --   --   --  0.43*   ANIONGAP  --  8  --   --   --   --   --   --   --  6   ADY  --  8.9  --   --   --   --   --   --   --  9.1   GLC  --  144* 92 124*   < >  --    < >  --    < > 105*   ALBUMIN  --   --   --   --   --   --   --   --   --  2.8*   PROTTOTAL  --   --   --   --   --   --   --   --   --  6.6*   BILITOTAL  --   --   --   --   --   --   --   --   --  0.2   ALKPHOS  --   --   --   --   --   --   --   --   --  144   ALT  --   --   --   --   --   --   --   --   --  24   AST  --   --   --   --   --   --   --   --   --  17    < > = values in this interval not displayed.     Recent Labs   Lab Test 09/26/22  0800 09/25/22  1248 09/25/22  0819 09/24/22  2200 09/24/22  1612   * 92 124* 100* 89     Recent Labs   Lab 09/23/22  0837   WBC 7.2         Recent Results (from the past 24 hour(s))   XR Abdomen 2 Views    Narrative    ABDOMEN TWO-THREE VIEW September 28, 2022 10:34 AM     HISTORY: Complains of abdominal pain at PEG tube site.    COMPARISON: None.    FINDINGS: PEG tube projects within the stomach in these views. Small  amount of stool. No free air. There are no air filled distended loops  of small bowel. The colon is not distended. The lung bases are  unremarkable.      Impression    IMPRESSION: Nonobstructed bowel gas pattern.    TAMARA THOMAS MD         SYSTEM ID:  [id number]       Medications   All medications were reviewed.      banatrol plus  1 packet Per Feeding Tube TID w/meals     cholestyramine  1 packet Oral  BID     levETIRAcetam  1,000 mg Oral or Feeding Tube Q12H     lidocaine  1 patch Transdermal Q24H     lidocaine   Transdermal Q8H MARCIA     miconazole with skin protectant   Topical BID     mirtazapine  15 mg Oral or Feeding Tube At Bedtime     multivitamins w/minerals  15 mL Per Feeding Tube Daily     nortriptyline  25 mg Oral BID     OXcarbazepine  450 mg Oral or Feeding Tube At Bedtime     pantoprazole  40 mg Oral or Feeding Tube QAM AC     QUEtiapine  25 mg Oral BID     QUEtiapine  50 mg Oral At Bedtime     saccharomyces boulardii  250 mg Per Feeding Tube BID     sodium chloride (PF)  10-40 mL Intracatheter Q8H     sodium chloride (PF)  10-40 mL Intracatheter Q7 Days     topiramate  50 mg Oral or Feeding Tube At Bedtime     [DISCONTINUED] acetaminophen **OR** acetaminophen, acetaminophen, artificial tears, glucose **OR** dextrose **OR** glucagon, guaiFENesin, ipratropium - albuterol 0.5 mg/2.5 mg/3 mL, melatonin, menthol-zinc oxide, metoprolol, miconazole with skin protectant, naloxone **OR** naloxone **OR** naloxone **OR** naloxone, ondansetron **OR** ondansetron, prochlorperazine **OR** prochlorperazine **OR** prochlorperazine, QUEtiapine, sodium chloride (PF), traMADol

## 2022-09-29 NOTE — PROGRESS NOTES
[facility]    Neurology Progress Note     Assessment & Plan   Julisa Chaney is a 77 year old female who was admitted on 7/27/2022.  Patient was dizziness, per her history it happens when she stands up, this might be related to orthostasis.  The patient is significantly malnourished, has history for brain abscess, C. difficile following prolonged antibiotic therapy.  EEG done on 7/29/2022 shows diffuse delta as well as periodic discharges consistent with severe diffuse encephalopathy no epileptiform activity.    I do not find a pertinent seizure history, I am not sure how many seizures the patient had and when was the last seizure.  She is on both Keppra and oxcarbazepine as well as low-dose of topiramate.  At this time I will suggest the following:    -Check level of oxcarbazepine and Keppra tomorrow morning before the morning dose.  -Orthostatic blood pressure and heart rate  -Might consider repeating an MRI of the head with and without contrast if symptoms do not improve.        Jamaica Nova MD    Interval History   I was called to see the patient for dizziness questionable related to Keppra therapy.  The patient was prior seen by neurology.  The patient was admitted in July with altered mental status with diarrhea fevers generalized weakness.  She was diagnosed with pneumonia secondary to COVID infection severe sepsis.  Patient was initially seen at [facility] for concern of brain abscess.  The patient was treated with broad-spectrum antibiotics with improvement in mental status, she has had left frontal ventriculostomy.  Cultures in the CSF  have been subsequently negative.  The patient developed C. difficile.  She required Vanco course.  She has had dysphagia and severe malnutrition.  Acute on chronic anemia.    Overall the patient has been doing better.  She is alert and oriented.  However she started to complain of dizziness.  The patient states that each  time when she tries to stand up she will feel dizzy.  When she tries to walk she will feel unbalanced.  The patient actually attributes this to anxiety and wants an anxiety pill.  The patient denies the room spinning sensation but stated that her head is spinning.  She denies hearing loss.  She denies ringing in her ears.    Physical Exam   Temp: 97.7  F (36.5  C) Temp src: Oral BP: 107/54 Pulse: 73   Resp: 16 SpO2: 95 % O2 Device: None (Room air)    Vitals:    09/14/22 0616 09/20/22 0540 09/28/22 0601   Weight: 49 kg (108 lb) 58 kg (127 lb 13.9 oz) 54.6 kg (120 lb 5.9 oz)     Vital Signs with Ranges  Temp:  [97.5  F (36.4  C)-98  F (36.7  C)] 97.7  F (36.5  C)  Pulse:  [73-76] 73  Resp:  [16-18] 16  BP: (107-120)/(54-61) 107/54  SpO2:  [95 %-96 %] 95 %  I/O last 3 completed shifts:  In: 480 [P.O.:220; NG/GT:260]  Out: -     The patient is Slim lady pale in no acute distress.  She is oriented to person and place.  She follows simple commands appropriately.  Extraocular movements are intact there is no nystagmus.  Face is symmetric.  Tongue protrudes midline.  There is no pronator drift.  The patient moves both lower extremities with a questionable mild weakness in hip flexion on both sides.  Reflexes are normal in upper extremities and 0 in both lower extremities.  Vibratory sense was decreased at the toes.  Gait was deferred.    Medications       banatrol plus  1 packet Per Feeding Tube TID w/meals     cholestyramine  1 packet Oral BID     levETIRAcetam  1,000 mg Oral or Feeding Tube Q12H     lidocaine  1 patch Transdermal Q24H     lidocaine   Transdermal Q8H MARCIA     miconazole with skin protectant   Topical BID     mirtazapine  15 mg Oral or Feeding Tube At Bedtime     multivitamins w/minerals  15 mL Per Feeding Tube Daily     nortriptyline  25 mg Oral BID     OXcarbazepine  450 mg Oral or Feeding Tube At Bedtime     pantoprazole  40 mg Oral or Feeding Tube QAM AC     QUEtiapine  25 mg Oral BID     QUEtiapine  50  mg Oral At Bedtime     saccharomyces boulardii  250 mg Per Feeding Tube BID     sodium chloride (PF)  10-40 mL Intracatheter Q8H     sodium chloride (PF)  10-40 mL Intracatheter Q7 Days     topiramate  50 mg Oral or Feeding Tube At Bedtime       Data   Results for orders placed or performed during the hospital encounter of 07/27/22 (from the past 24 hour(s))   Potassium   Result Value Ref Range    Potassium 3.7 3.4 - 5.3 mmol/L     MRI of the brain on 7/30/2022 shows ventriculitis lateral ventricle left greater than right, fourth ventricle along with probable abscess in the region of the frontal horn of left lateral ventricle.  Questionable enhancement interpeduncular cistern and increased cerebral vascularity suggestive of early leptomeningeal enhancement.  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  Films were reviewed by me.

## 2022-09-29 NOTE — PROGRESS NOTES
CALORIE COUNT      Approximate Oral Intake for:  (9/28 - 3 meals)    Calories: 888 kcals  Protein: 15 gm    TF via PEG - Vital 1.5 @ 60 mL/hr x 12 hrs (8pm-8am) + 1 packet Banatrol TID = 1200 kcal, 50 g protein, 165 g CHO, 550 mL free water and 10 g fiber    TOTAL (po + TF) = 2088 cals (108% needs), 65 gm pro (93% needs)        Estimated Needs:    Dosing Weight 48.3 kg (7/27 - admit wt)  Estimated Energy Needs: 7579-5912 kcals (35-40 Kcal/Kg)  Estimated Protein Needs: 70-95 grams protein (1.5-2 g pro/Kg)      Summary:   Diet: Easy to Chew (L7), Thin Liquids + Magic Cup with lunch  Tolerating TF  Continue calorie count    Aleena Stoddard RD, LD  Clinical Dietitian - [facility]   Pager - [phone]

## 2022-09-29 NOTE — PLAN OF CARE
[id number]    Pt here with sepsis and cerebral abscess. A/Ox4. BLE neuropathy. Easy to chew, thin liquids. Pills whole. PEG runs 8p-8a. Q6hr 100mL flushes. A1/GB/W. Continent. Redness on coccyx. Tramadol and tylenol PRN. TCU at discharge    Per neurology, Keppra level to be drawn in AM before morning dose

## 2022-09-30 ENCOUNTER — APPOINTMENT (OUTPATIENT)
Dept: PHYSICAL THERAPY | Facility: CLINIC | Age: 77
DRG: 023 | End: 2022-09-30
Attending: INTERNAL MEDICINE
Payer: COMMERCIAL

## 2022-09-30 LAB
LEVETIRACETAM SERPL-MCNC: 25.7 ΜG/ML (ref 10–40)
PHOSPHATE SERPL-MCNC: 3.6 MG/DL (ref 2.5–4.5)
POTASSIUM BLD-SCNC: 4.4 MMOL/L (ref 3.4–5.3)

## 2022-09-30 PROCEDURE — 80177 DRUG SCRN QUAN LEVETIRACETAM: CPT | Performed by: PSYCHIATRY & NEUROLOGY

## 2022-09-30 PROCEDURE — 84100 ASSAY OF PHOSPHORUS: CPT | Performed by: INTERNAL MEDICINE

## 2022-09-30 PROCEDURE — 250N000013 HC RX MED GY IP 250 OP 250 PS 637: Performed by: HOSPITALIST

## 2022-09-30 PROCEDURE — 80183 DRUG SCRN QUANT OXCARBAZEPIN: CPT | Performed by: PSYCHIATRY & NEUROLOGY

## 2022-09-30 PROCEDURE — 250N000013 HC RX MED GY IP 250 OP 250 PS 637: Performed by: INTERNAL MEDICINE

## 2022-09-30 PROCEDURE — 99233 SBSQ HOSP IP/OBS HIGH 50: CPT | Performed by: INTERNAL MEDICINE

## 2022-09-30 PROCEDURE — 97116 GAIT TRAINING THERAPY: CPT | Mod: GP | Performed by: PHYSICAL THERAPIST

## 2022-09-30 PROCEDURE — 36415 COLL VENOUS BLD VENIPUNCTURE: CPT | Performed by: PSYCHIATRY & NEUROLOGY

## 2022-09-30 PROCEDURE — 120N000001 HC R&B MED SURG/OB

## 2022-09-30 PROCEDURE — 84132 ASSAY OF SERUM POTASSIUM: CPT | Performed by: INTERNAL MEDICINE

## 2022-09-30 PROCEDURE — 250N000013 HC RX MED GY IP 250 OP 250 PS 637

## 2022-09-30 RX ORDER — LEVETIRACETAM 100 MG/ML
750 SOLUTION ORAL EVERY 12 HOURS
Status: DISCONTINUED | OUTPATIENT
Start: 2022-09-30 | End: 2022-10-03

## 2022-09-30 RX ADMIN — LIDOCAINE 1 PATCH: 560 PATCH PERCUTANEOUS; TOPICAL; TRANSDERMAL at 08:40

## 2022-09-30 RX ADMIN — MICONAZOLE NITRATE: 20 CREAM TOPICAL at 08:43

## 2022-09-30 RX ADMIN — QUETIAPINE FUMARATE 50 MG: 50 TABLET ORAL at 21:18

## 2022-09-30 RX ADMIN — Medication 15 ML: at 08:39

## 2022-09-30 RX ADMIN — ACETAMINOPHEN 650 MG: 325 TABLET, FILM COATED ORAL at 16:19

## 2022-09-30 RX ADMIN — TRAMADOL HYDROCHLORIDE 25 MG: 50 TABLET ORAL at 14:37

## 2022-09-30 RX ADMIN — CHOLESTYRAMINE 4 G: 4 POWDER, FOR SUSPENSION ORAL at 08:40

## 2022-09-30 RX ADMIN — Medication 250 MG: at 21:18

## 2022-09-30 RX ADMIN — OXCARBAZEPINE 450 MG: 300 SUSPENSION ORAL at 21:18

## 2022-09-30 RX ADMIN — NORTRIPTYLINE HYDROCHLORIDE 25 MG: 25 CAPSULE ORAL at 08:39

## 2022-09-30 RX ADMIN — Medication 40 MG: at 08:39

## 2022-09-30 RX ADMIN — MICONAZOLE NITRATE: 20 CREAM TOPICAL at 21:44

## 2022-09-30 RX ADMIN — Medication 1 PACKET: at 08:44

## 2022-09-30 RX ADMIN — QUETIAPINE FUMARATE 25 MG: 25 TABLET ORAL at 16:19

## 2022-09-30 RX ADMIN — Medication 250 MG: at 08:39

## 2022-09-30 RX ADMIN — LEVETIRACETAM 750 MG: 100 SOLUTION ORAL at 21:18

## 2022-09-30 RX ADMIN — TRAMADOL HYDROCHLORIDE 25 MG: 50 TABLET ORAL at 04:20

## 2022-09-30 RX ADMIN — ACETAMINOPHEN 650 MG: 325 TABLET, FILM COATED ORAL at 11:27

## 2022-09-30 RX ADMIN — ACETAMINOPHEN 650 MG: 325 TABLET, FILM COATED ORAL at 21:16

## 2022-09-30 RX ADMIN — QUETIAPINE FUMARATE 25 MG: 25 TABLET ORAL at 08:40

## 2022-09-30 RX ADMIN — LEVETIRACETAM 1000 MG: 100 SOLUTION ORAL at 08:39

## 2022-09-30 RX ADMIN — Medication 1 PACKET: at 18:30

## 2022-09-30 RX ADMIN — MIRTAZAPINE 15 MG: 15 TABLET, FILM COATED ORAL at 21:18

## 2022-09-30 RX ADMIN — TRAMADOL HYDROCHLORIDE 25 MG: 50 TABLET ORAL at 21:16

## 2022-09-30 RX ADMIN — NORTRIPTYLINE HYDROCHLORIDE 25 MG: 25 CAPSULE ORAL at 21:18

## 2022-09-30 RX ADMIN — TOPIRAMATE 50 MG: 50 TABLET ORAL at 21:18

## 2022-09-30 RX ADMIN — CHOLESTYRAMINE 4 G: 4 POWDER, FOR SUSPENSION ORAL at 21:18

## 2022-09-30 RX ADMIN — ACETAMINOPHEN 650 MG: 325 TABLET, FILM COATED ORAL at 04:20

## 2022-09-30 ASSESSMENT — ACTIVITIES OF DAILY LIVING (ADL)
ADLS_ACUITY_SCORE: 37

## 2022-09-30 NOTE — CONSULTS
Attempted x 2 to see patient this afternoon. She was sound asleep, did not awaken to her name repeated loudly multiple times. Will re-order consult for tomorrow.

## 2022-09-30 NOTE — PROGRESS NOTES
Care Management Follow Up    Length of Stay (days): 65    Expected Discharge Date: 10/01/2022     Concerns to be Addressed:       Patient plan of care discussed at interdisciplinary rounds: Yes    Anticipated Discharge Disposition:   Anticipated Discharge Services:    Anticipated Discharge DME:      Patient/family educated on Medicare website which has current facility and service quality ratings:    Education Provided on the Discharge Plan:    Patient/Family in Agreement with the Plan: yes    Referrals Placed by CM/SW: Post Acute Facilities (Submit for insurance authorization)  Private pay costs discussed: Not applicable    Additional Information:  Writer received call from admissions at [facility] with questions. Inquired if patient could be switched to a different tube feeding formula as the one she is currently on would be too expensive for the facility. Also noted that the patient would need to be off PRN seroquel before admitting. Writer noted patient triggered for a level 2 screening and needs that assessment done prior to discharge therefore it will allow time to see if changing formula/med changes. Admissions requested for writer to follow up on Monday with them for bed availability.     Will continue to follow.    ARLEN Bellamy, LGSW    [facility]

## 2022-09-30 NOTE — PLAN OF CARE
No changes this shift. A&O x4,  neuros unchanged. VSS. Easy to chew diet, thin liquids. Takes pills whole one at a time. PEG tube in place, TF started 8pm as ordered, due to be stopped at 8am. Running at 60ml/hr with Q6h 100 ml FWF. Some urinary incontinence overnight, otherwise pt able to ambulate to bathroom Ax1 GBW. Discharge to TCU pending.

## 2022-09-30 NOTE — PROGRESS NOTES
"TF started at 2000 at 60ml/hr, pt tolerating. VSS on room air. Ambulating assist of 1 with walker. Voiding. Easy to chew diet. Neuros intact. Continues to be very anxious at times. Tonight she was tearful, and saying \"I just want to die, everybody here hates me\". Her biggest complaint is the PEG tube, and the pain that it's causing her, but has many different generalized complaints. Was able to talk with her, give her reassurance and encouragement, until she calmed down. When trying to clarify things with her, it was asked if she currently has any thoughts of harming herself, or SI, to which she said no. Tramadol given for pain. Continuing to await for TCU placement.  "

## 2022-09-30 NOTE — PROGRESS NOTES
Care Management Follow Up    Length of Stay (days): 65    Expected Discharge Date: 10/01/2022     Concerns to be Addressed:       Patient plan of care discussed at interdisciplinary rounds: Yes    Anticipated Discharge Disposition: TCU     Anticipated Discharge Services:    Anticipated Discharge DME:      Patient/family educated on Medicare website which has current facility and service quality ratings:    Education Provided on the Discharge Plan:    Patient/Family in Agreement with the Plan: yes    Referrals Placed by CM/SW: Post Acute Facilities (Submit for insurance authorization)  Private pay costs discussed: Not applicable    Additional Information:  Writer left voicemails with outstanding TCU referrals -   [facility], and [facility].     Mar[facility] stated at this time they do not have the ability to accommodate any additional tube feeding patients.     Will continue to follow.    ARLEN Bellamy, LGSW    [facility]

## 2022-09-30 NOTE — PROGRESS NOTES
CLINICAL NUTRITION SERVICES - REASSESSMENT NOTE      RECOMMENDATIONS FOR MD/PROVIDER TO ORDER:   - recommend pt meets >60% of energy and protein needs via PO intake before discontinuing TF  Recent kcal count 9/27-29 3 day average PO intake = 974 kcal (57% minimum energy needs) and 21 g protein (30% minimum protein needs)   Recommendations Ordered by Registered Dietitian (RD):   - ordered cherry gel+ with dinner  - continue magic cup with lunch as ordered  - modified TF to vital 1.5 @ 50 mL/hr x12 hours (8 pm - 8 am) + 1 pkt Banatrol TID to provide 600 mL, 1020 kcal, 41 g protein, 10 g fiber, 458 mL free water  - ordered kcal counts (10/1-3) to continue assessing PO intake and adjust TF as appropriate  - nutrition education: discussed recent kcal matias data, encouraged PO intake and discussed additional ONS to add to improve PO intake, specifically protein intake. Discussed continue TF with modified rate to potentially improve appetite      Malnutrition:   % Weight Loss: > 10% in 6 months (severe malnutrition) - per 9/18 RD note  % Intake:  No decreased intake noted - pt meeting estimated needs via PO + TF  Subcutaneous Fat Loss:  Orbital region moderate depletion  Muscle Loss:  Temporal region moderate depletion, Clavicle bone region moderate depletion, Acromion bone region moderate-severe depletion and Dorsal hand region severe depletion   Fluid Retention:  None noted    Malnutrition Diagnosis: Severe malnutrition  In Context of:  Acute illness or injury  Chronic illness or disease       EVALUATION OF PROGRESS TOWARD GOALS   Diet: easy to chew (L7), thin liquids (L0)  - beery magic cup with lunch, pt to order additional PRN  - RSWA    Nutrition Support:   Type of Feeding Tube: PEG (8/16)  Enteral Frequency: Cyclic   Enteral Regimen: Vital 1.5 @ 60 mL/hr x 12 hours (8 pm - 8 am) + 1 packet Banatrol TID   Total Enteral Provisions: 1200 kcal, 50 g protein, 165 g CHO, 550 mL free water and 10 g fiber   Free Water Flush:  100 mL q 6 hours (per MD 9/23)  Total Fluid (TF + FWF) = 950 mL/day     - per chart review, TF has been running overnight without issue. Pt continues to complain of G-tube discomfort --> per 9/29 MD note: Plan to discuss/consult IR regarding possible g-tube removal after discussing with pt's family.    - visited with pt this AM, she is tolerating TF but would like to have it discontinued when able. Dicussed plan to continue with TF at this time as her recent kcal count data was inadequate- though will reduce rate by 10 mL to hopefully improve her appetite. Encouraged PO intake and added additional ONS to provide additional protein to diet to improve PO intake and meet needs with modified TF. Pt would like to try cherry gel+.     Intake/Tolerance:   - per nursing flow sheet, 25-50% intakes documented  - per health touch, pt receiving 3 meals/day + 1 supplement/day  - per chart review, pt has fair appetite    Calorie Counts    9/29: 830 kcal and 15 g protein (2 meals, 1 supplement)  9/28: 888 kcal and 15 g protein (3 meals, 1 supplement)  9/27: 1204 kcal and 34 g protein (3 meals, 1 supplement)    3 day average PO intake = 974 kcal (57% minimum energy needs) and 21 g protein (30% minimum protein needs)  TF + PO intake = 2174 kcal (>100% minimum energy needs) and 71 g protein (100% minimum protein needs) --> will plan to adjust TF to 50 mL/hr to provide 1060 kcal (21 kcal/kg) and 41 g protein (0.8 g/kg)      ASSESSED NUTRITION NEEDS:  Dosing Weight 48.3 kg (7/27 - admit wt)  Estimated Energy Needs: 0892-9366 kcals (35-40 Kcal/Kg)  Justification: repletion and underweight  Estimated Protein Needs: 70-95 grams protein (1.5-2 g pro/Kg)  Justification: repletion   Estimated Fluid Needs: 1 mL/Kcal for maintenance or per provider pending fluid status       NEW FINDINGS:   General:   - discharge still pending TCU placement  - 9/26: per OT- slums assessment 18/30    Weight: wt up +6.3 kg overall since admit, down about -3.4 kg  since last week (suspect mostly d/t bed scale variability)    Medications: remeron, multivitamins w minerals liquid, protonix, topiramate    GI: last BM x3 yesterday      Previous Goals:   EN + PO intake to meet % nutrition needs vs PO intake >/= 60% to justify wean of TF   Evaluation: Met    Previous Nutrition Diagnosis:   Inadequate oral intake related to poor appetite and dislike of hospital food as evidenced by pt eating 25-50% meals and still requiring EN to meet nutrition needs   Evaluation: No change      MALNUTRITION  % Weight Loss: > 10% in 6 months (severe malnutrition) - per 9/18 RD note  % Intake:  No decreased intake noted - pt meeting estimated needs via PO + TF  Subcutaneous Fat Loss:  Orbital region moderate depletion  Muscle Loss:  Temporal region moderate depletion, Clavicle bone region moderate depletion, Acromion bone region moderate-severe depletion and Dorsal hand region severe depletion   Fluid Retention:  None noted    Malnutrition Diagnosis: Severe malnutrition  In Context of:  Acute illness or injury  Chronic illness or disease    CURRENT NUTRITION DIAGNOSIS  Inadequate oral intake related to poor appetite and dislike of hospital food as evidenced by pt eating 25-50% meals and still requiring EN to meet nutrition needs     INTERVENTIONS  Recommendations / Nutrition Prescription  - ordered cherry gel+ with dinner  - continue magic cup with lunch as ordered  - modified TF to vital 1.5 @ 50 mL/hr x12 hours (8 pm - 8 am) + 1 pkt Banatrol TID to provide 600 mL, 1020 kcal, 41 g protein, 10 g fiber, 458 mL free water  - ordered kcal counts (10/1-3) to continue assessing PO intake and adjust TF as appropriate  - nutrition education: discussed recent kcal matias data, encouraged PO intake and discussed additional ONS to add to improve PO intake, specifically protein intake. Discussed continue TF with modified rate to potentially improve appetite       Implementation  EN Schedule  Medical Food  Supplement    Goals  1. TF + PO to meet % estimated needs over next 3-5 days.  2. Pt to consume >50% of meals TID with supplements BID      MONITORING AND EVALUATION:  Progress towards goals will be monitored and evaluated per protocol and Practice Guidelines      Jaylin Matos RD, LD

## 2022-09-30 NOTE — PLAN OF CARE
Intermittently anxious, alert and oriented, assist of one, gait belt and walker, Isolation precaution MRSA, Prn Tramadol and Tylenol for Pain, Easily chew diet, on calorie count, Peg tube feeding 8p-8a, Discharge pending TCU bed. Psych consult pending.

## 2022-09-30 NOTE — PLAN OF CARE
Goal Outcome Evaluation:    Plan of Care Reviewed With: patient     Overall Patient Progress: improving    Outcome Evaluation: visited with pt this AM. pt on easy to chew diet with thin liquids. encouraged PO intake with adequate protein. added additional ONS. modified TF to 50 mL/hr to hopefully improve appetite. recommend pt meets >60% of estimated nutrition needs via PO intake before discontinuing TF. previous kcal count met ~57% of energy and 30% of protein. ordered continued kcal count    Jaylin Matos RD, LD

## 2022-09-30 NOTE — PROGRESS NOTES
Care Management Follow Up    Length of Stay (days): 65    Expected Discharge Date: 10/01/2022     Concerns to be Addressed:       Patient plan of care discussed at interdisciplinary rounds: Yes    Anticipated Discharge Disposition: LTACH     Anticipated Discharge Services:    Anticipated Discharge DME:      Patient/family educated on Medicare website which has current facility and service quality ratings:    Education Provided on the Discharge Plan:    Patient/Family in Agreement with the Plan: yes    Referrals Placed by CM/SW: Post Acute Facilities (Submit for insurance authorization)  Private pay costs discussed: Not applicable    Additional Information:  Writer received a voicemail from [facility] stating they clinically can accept patient and requested facesheet. Writer noted through epic all needed documentation can be found.   Writer completed PAS and patient triggered for a level 2 screening. Writer placed call to Karis through the [facility] ([phone]) to see if it could be escalated in order avoid discharge delays - awaiting return call back.   Patient is unable to discharge until this level 2 screen assessment has been completed.  Writer also consulted psych to visit with patient.    PAS-RR    D: Per DHS regulation, SW completed and submitted PAS-RR to [place] [employer] Direct Connect via the Senior LinkAge Line.  PAS-RR confirmation # is : [id number]    I: SW spoke with patient and they are aware a PAS-RR has been submitted.  SW reviewed with Patient that they may be contacted for a follow up appointment within 10 days of hospital discharge if their SNF stay is < 30 days.  Contact information for Senior LinkAge Line was also provided.    A: Patient verbalized understanding.    P: Further questions may be directed to [facility] LinkAge Line at #[phone], option #4 for PAS-RR staff.        ARLEN Bellamy, LGSW    [facility]

## 2022-09-30 NOTE — PROGRESS NOTES
[facility]    Internal Medicine Hospitalist Progress Note  09/30/2022  I evaluated patient on the above date.    Pablito Medeiros Jr., MD  [phone] (p)  Text Page  Vocera        Assessment & Plan New actions/orders today (09/30/2022) are underlined.    Julisa Chaney is a 77 year old female with history including trigeminal neuralgia, who was initially admitted to [facility] on 7/19/2022 for severe sepsis due to COVID-19 infection and bacterial pneumonia. Treated with 5 days of remdesivir (did not require any steroids) as well as  IV meropenem and IV vancomycin. Hospital course complicated by an acute and prolonged metabolic encephalopathy. Ultimately, head CT 7/25/2022 showed a possible left frontal mass causing vasogenic edema. 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 Antibiotics switched to IV vancomycin, cefepime and metronidazole. Transferred to [facility] 7/27/2022 for neurosurgical assessment.    Underwent left frontal ventriculostomy on 7/31/2022. Developed C. difficile while on antibiotics requiring rectal tube and a prolonged vanco course. Significant dysphagia and s/p g-tube 8/16. Mental status slowly improved. Hospital course prolonged with discharge disposition.       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  * Initial admission at [facility] 7/19 and transfer to [facility] 7/27 as above.  * Continued on broad antibiotics on admit.  * Neurosurgery, Neurology, ID and Oncology consulted.  * EEG 7/26 showed left frontotemporal region slowing with intermittent sharp wave activity; 48 hrs EEG recording showed severe diffuse nonspecific encephalopathy with irritability over the posterior head regions.  * MRI brain 7/30 showed ventriculitis with probable abscess.   * Underwent above surgery on 7/31; cultures no growth.  * Brain, CSF flow cytometry 8/1 did not  show any evidence of malignancy.  * EVD removed 8/8.  * Antibiotics narrowed to meropenem and vancomycin 8/13.  * Head CT 8/19 showed stable presumed vasogenic edema in the white matter of the anterior inferior left frontal lobe related to ventriculitis of the left lateral ventricle; stable ventriculomegaly of the left lateral ventricle.  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  * Given slow improvement, Hospitalist team had briefly discussed palliative care with patient's daughter on 9/6/22; they wanted restorative care at this time.  * Completed 6 weeks IV antibiotics on 9/8.  - Monitor clinically.  - Continue PRN acetaminophen, PRN tramadol - change from q8h --> q6h; minimize opioids as able.    Acute infectious and metabolic encephalopathy due to above, resolved.  Concern for cognitive impairment with slums 18/30.  * Initial presentation as above.  * Treatment of brain abscess as above.  * Mentation subsequently much improved overall; however, fluctuating at times and will not engage in conversation most of the times.  * OT - Slums assessment 18/30 on 9/26.  - Continue to other treat issues as noted.  - Re-orient as needed.  - Maintain normal day/night, sleep/wake cycles.  - Minimize sedating medications as able.  - Neurocognitive assessment as outpatient.    Severe dysphagia.  Severe malnutrition related to acute on chronic medical disease.  * Pt with significant dysphagia and severe malnutrition (see Nutrition notes) that required a feeding tube.  * G-tube placed 8/16.  * Diet able to be advanced.  * Pt with persistent complaints of discomfort relating to g-tube.  * AXR 9/28 negative for acute findings.  - Continue dysphagia 7 diet with thin liquids.  - Continue  TF's.  - With regard to eventually discontinuing the g-tube, per d/w Nutrition 9/30, pt making progress and is close to being able to maintain nutrition on her own and titrating down the TF's.    C. difficile colitis, treated.  * Patient was noted to have copious diarrhea on 8/1; testing positive for C. Difficile. Started on PO vanco 8/2.  * Required rectal tube.  * Rectal tube was discontinued 8/28.   * Extended course of vancomycin given other antibiotics for brain abscess.  * Completed treatment with oral vancomycin 9/23.  - Continue saccharomyces boulardii probiotic.  - Continue cholestyramine BID.    Acute on chronic anemia, multifactorial due to anemia of chronic disease + iron deficiency.  * Hgb on admission was 11.7.  * Hgb slowly trended down. Iron studies 7/30 suggested anemia of chronic disease + iron deficiency; vitamin B12 and folate not decreased.  * Received 1 unit PRBC on 8/8/22 for Hb 6.9.  * Received IV iron infusion x3 with first on 8/11-8/12.  * Hemoglobin subsequently stable around 10.  - Monitor hgb periodically as needed.     Depression/anxiety with suicidal thoughts and agitation.  * PTA on mirtazapine.  * With improvement in her mentation, she started becoming restless and agitated at times.  * PRN quetiapine started during this hospitalization.  * On 8/27, reported having suicidal thoughts; no plans/attempts. Initiated suicide precautions and sitter; evaluated by Psychiatry service; sitter subsequently discontinued.  * Scheduled quetiapine started 8/29 and subsequently titrated.  * On 9/30, again expressed passive suicidal ideations.  - Re-consult Psychiatry  - Continue mirtazapine 15 mg at bedtime.  - Continue quetiapine 25 mg BID and 50 mg at bedtime; PRN quetiapine 12.5 mg BID.    Back pain, suspect exacerbated by immobility.   - Continue lidocaine patches, PRN acetaminophen, PRN tramadol; minimize opioids as able.    Trigeminal neuralgia.  * PTA on levetiracetam, nortriptyline,  oxcarbazepine, topiramate.  * Levetiracetam increased at [facility] in the setting of brain abscess.  * Neurology consulted 9/29 regarding possibly decreasing levetiracetam dose.  - Continue levetiracetam, nortriptyline, oxcarbazepine, topiramate.  - Appreciate help from Neurology regarding possibly decreasing levetiracetam dose.    GERD.  History of duodenal stricture.  History of pyloric ulcer.  * EGD 2020 showed gastroduodenitis with a duodenal stricture.  - Continue pantoprazole.    Weakness and physical deconditioning due to multiple acute and chronic medical issues.  - Continue PT and OT.  - Plan TCU.       Hyperglycemia, suspect stress induced, resolved.  * HbA1c was 5.6 on 7/19/2022.    * Required ISS this hospitalization, subsequently stopped.    Hypokalemia, hypophosphatemia, resolved.  * Potassium and phosphorus low at times this hospitalization. Treated with replacement.    Hypernatremia, resolved.  Hyponatremia, resolved.  * Had hypernatremia this hospitalization and was started on free water flushes.    * Had hyponatremia and water flushed decreased.  * Sodium subsequently normalized.  - Monitor BMP periodically.        Clinically Significant Risk Factors Present on Admission                        COVID-19 testing.  COVID-19 PCR Results    COVID-19 PCR Results 2/7/22 7/19/22   SARS CoV2 PCR Negative Positive (A)   (A) Abnormal value       Comments are available for some flowsheets but are not being displayed.         COVID-19 Antibody Results, Testing for Immunity    COVID-19 Antibody Results, Testing for Immunity   No data to display.             Diet: Snacks/Supplements Adult: Other; L: mota Magic Cup, PRN sup w/ dinner; With Meals  Room Service  Combination Diet Easy to Chew (level 7); Thin Liquids (level 0) (pt prefers straws)  Calorie Counts  Adult Formula Drip Feeding: Continuous Vital 1.5; Gastrostomy; Goal Rate: 50 mL/hr x 12 hrs (8pm-8am); mL/hr; Medication - Feeding Tube Flush  "Frequency: At least 15-30 mL water before and after medication administration and with tube clogging; [place]...  Snacks/Supplements Adult: Gelatein Plus; With Meals    Prophylaxis: PCD's, ambulation.   Abrams Catheter: Not present  Central Lines: None  Code Status: Full Code    Disposition Plan   Expected discharge: Recommended to transitional care unit pending bed availability.  Entered: Pablito Medeiros MD 09/30/2022, 1:03 PM         Interval History    Pt told nurse earlier \"I want to die\"; was frustrated with her current situation.  C/o headache and abdominal discomfort related to g-tube.  Otherwise, tolerating diet.    -Data reviewed today: I reviewed all new labs and imaging over the last 24 hours. I personally reviewed no images or EKG's today.    Physical Exam    , Blood pressure 101/50, pulse 81, temperature 98.4  F (36.9  C), temperature source Oral, resp. rate 18, weight 54.6 kg (120 lb 5.9 oz), SpO2 98 %, not currently breastfeeding. O2 Device: None (Room air)    Vitals:    09/14/22 0616 09/20/22 0540 09/28/22 0601   Weight: 49 kg (108 lb) 58 kg (127 lb 13.9 oz) 54.6 kg (120 lb 5.9 oz)     Vital Signs with Ranges  Temp:  [97  F (36.1  C)-98.5  F (36.9  C)] 98.4  F (36.9  C)  Pulse:  [78-81] 81  Resp:  [18] 18  BP: (100-120)/(50-79) 101/50  SpO2:  [95 %-99 %] 98 %  Patient Vitals for the past 24 hrs:   BP Temp Temp src Pulse Resp SpO2   09/30/22 1115 101/50 98.4  F (36.9  C) Oral 81 18 98 %   09/30/22 0805 100/59 98.5  F (36.9  C) Oral 78 18 95 %   09/30/22 0100 114/79 97  F (36.1  C) Oral -- 18 99 %   09/29/22 1606 120/76 97.6  F (36.4  C) -- 81 18 98 %     I/O's Last 24 hours  I/O last 3 completed shifts:  In: 880 [P.O.:480; NG/GT:400]  Out: -     Constitutional: Awake, alert, pleasant, conversant.  Respiratory: Diminished in bases. No crackles or wheezes.  Cardiovascular: RRR, no m/r/g.  GI: Soft, nd, tender, no r/g, +BS.  Skin/Integumen:   Other:        Data   Recent Labs   Lab 09/30/22  0836 " 09/29/22  0832 09/28/22  0855 09/26/22  0800 09/26/22  0800 09/25/22  1248 09/25/22  0819 09/24/22  1612 09/24/22  0745   NA  --   --   --   --  136  --   --   --  137   POTASSIUM 4.4 3.7 4.1   < > 4.2  --   --   --   --    CHLORIDE  --   --   --   --  104  --   --   --   --    CO2  --   --   --   --  24  --   --   --   --    BUN  --   --   --   --  18  --   --   --   --    CR  --   --   --   --  0.52  --   --   --   --    ANIONGAP  --   --   --   --  8  --   --   --   --    ADY  --   --   --   --  8.9  --   --   --   --    GLC  --   --   --   --  144* 92 124*   < >  --     < > = values in this interval not displayed.     Recent Labs   Lab Test 09/26/22  0800 09/25/22  1248 09/25/22  0819 09/24/22  2200 09/24/22  1612   * 92 124* 100* 89     No results for input(s): WBC, CRP, DD, LDH, FIBR, PAOLA, IL6B, LACT, LACTS, PCAL in the last 168 hours.      No results found for this or any previous visit (from the past 24 hour(s)).    Medications   All medications were reviewed.      banatrol plus  1 packet Per Feeding Tube TID w/meals     cholestyramine  1 packet Oral BID     levETIRAcetam  1,000 mg Oral or Feeding Tube Q12H     lidocaine  1 patch Transdermal Q24H     lidocaine   Transdermal Q8H MARCIA     miconazole with skin protectant   Topical BID     mirtazapine  15 mg Oral or Feeding Tube At Bedtime     multivitamins w/minerals  15 mL Per Feeding Tube Daily     nortriptyline  25 mg Oral BID     OXcarbazepine  450 mg Oral or Feeding Tube At Bedtime     pantoprazole  40 mg Oral or Feeding Tube QAM AC     QUEtiapine  25 mg Oral BID     QUEtiapine  50 mg Oral At Bedtime     saccharomyces boulardii  250 mg Per Feeding Tube BID     sodium chloride (PF)  10-40 mL Intracatheter Q8H     sodium chloride (PF)  10-40 mL Intracatheter Q7 Days     topiramate  50 mg Oral or Feeding Tube At Bedtime     [DISCONTINUED] acetaminophen **OR** acetaminophen, acetaminophen, artificial tears, glucose **OR** dextrose **OR** glucagon,  guaiFENesin, ipratropium - albuterol 0.5 mg/2.5 mg/3 mL, melatonin, menthol-zinc oxide, metoprolol, miconazole with skin protectant, naloxone **OR** naloxone **OR** naloxone **OR** naloxone, ondansetron **OR** ondansetron, prochlorperazine **OR** prochlorperazine **OR** prochlorperazine, QUEtiapine, sodium chloride (PF), traMADol

## 2022-10-01 ENCOUNTER — APPOINTMENT (OUTPATIENT)
Dept: SPEECH THERAPY | Facility: CLINIC | Age: 77
DRG: 023 | End: 2022-10-01
Attending: INTERNAL MEDICINE
Payer: COMMERCIAL

## 2022-10-01 LAB
GLUCOSE BLDC GLUCOMTR-MCNC: 111 MG/DL (ref 70–99)
GLUCOSE BLDC GLUCOMTR-MCNC: 114 MG/DL (ref 70–99)

## 2022-10-01 PROCEDURE — 250N000013 HC RX MED GY IP 250 OP 250 PS 637: Performed by: INTERNAL MEDICINE

## 2022-10-01 PROCEDURE — 250N000013 HC RX MED GY IP 250 OP 250 PS 637: Performed by: PSYCHIATRY & NEUROLOGY

## 2022-10-01 PROCEDURE — 92526 ORAL FUNCTION THERAPY: CPT | Mod: GN | Performed by: SPEECH-LANGUAGE PATHOLOGIST

## 2022-10-01 PROCEDURE — 120N000001 HC R&B MED SURG/OB

## 2022-10-01 PROCEDURE — 250N000013 HC RX MED GY IP 250 OP 250 PS 637

## 2022-10-01 PROCEDURE — 99233 SBSQ HOSP IP/OBS HIGH 50: CPT | Performed by: PSYCHIATRY & NEUROLOGY

## 2022-10-01 PROCEDURE — 250N000013 HC RX MED GY IP 250 OP 250 PS 637: Performed by: HOSPITALIST

## 2022-10-01 PROCEDURE — 99232 SBSQ HOSP IP/OBS MODERATE 35: CPT | Performed by: INTERNAL MEDICINE

## 2022-10-01 RX ORDER — GABAPENTIN 100 MG/1
100 CAPSULE ORAL 3 TIMES DAILY PRN
Status: DISCONTINUED | OUTPATIENT
Start: 2022-10-01 | End: 2022-10-04 | Stop reason: HOSPADM

## 2022-10-01 RX ADMIN — LIDOCAINE 1 PATCH: 560 PATCH PERCUTANEOUS; TOPICAL; TRANSDERMAL at 08:42

## 2022-10-01 RX ADMIN — OXCARBAZEPINE 450 MG: 300 SUSPENSION ORAL at 22:32

## 2022-10-01 RX ADMIN — MICONAZOLE NITRATE: 20 CREAM TOPICAL at 21:59

## 2022-10-01 RX ADMIN — NORTRIPTYLINE HYDROCHLORIDE 25 MG: 25 CAPSULE ORAL at 21:59

## 2022-10-01 RX ADMIN — TOPIRAMATE 50 MG: 50 TABLET ORAL at 21:59

## 2022-10-01 RX ADMIN — ACETAMINOPHEN 650 MG: 325 TABLET, FILM COATED ORAL at 08:41

## 2022-10-01 RX ADMIN — Medication 250 MG: at 08:41

## 2022-10-01 RX ADMIN — Medication 1 PACKET: at 17:00

## 2022-10-01 RX ADMIN — LEVETIRACETAM 750 MG: 100 SOLUTION ORAL at 08:42

## 2022-10-01 RX ADMIN — MIRTAZAPINE 15 MG: 15 TABLET, FILM COATED ORAL at 21:59

## 2022-10-01 RX ADMIN — Medication 15 ML: at 08:43

## 2022-10-01 RX ADMIN — Medication 1 PACKET: at 13:47

## 2022-10-01 RX ADMIN — TRAMADOL HYDROCHLORIDE 25 MG: 50 TABLET ORAL at 04:09

## 2022-10-01 RX ADMIN — QUETIAPINE FUMARATE 25 MG: 25 TABLET ORAL at 08:41

## 2022-10-01 RX ADMIN — CHOLESTYRAMINE 4 G: 4 POWDER, FOR SUSPENSION ORAL at 22:00

## 2022-10-01 RX ADMIN — ACETAMINOPHEN AND CODEINE PHOSPHATE 1 TABLET: 300; 30 TABLET ORAL at 19:18

## 2022-10-01 RX ADMIN — QUETIAPINE FUMARATE 25 MG: 25 TABLET ORAL at 16:56

## 2022-10-01 RX ADMIN — CHOLESTYRAMINE 4 G: 4 POWDER, FOR SUSPENSION ORAL at 08:43

## 2022-10-01 RX ADMIN — Medication 250 MG: at 21:59

## 2022-10-01 RX ADMIN — Medication 40 MG: at 08:42

## 2022-10-01 RX ADMIN — MICONAZOLE NITRATE: 20 CREAM TOPICAL at 08:55

## 2022-10-01 RX ADMIN — ACETAMINOPHEN 650 MG: 325 TABLET, FILM COATED ORAL at 13:47

## 2022-10-01 RX ADMIN — ACETAMINOPHEN 650 MG: 325 TABLET, FILM COATED ORAL at 01:04

## 2022-10-01 RX ADMIN — TRAMADOL HYDROCHLORIDE 25 MG: 50 TABLET ORAL at 12:03

## 2022-10-01 RX ADMIN — NORTRIPTYLINE HYDROCHLORIDE 25 MG: 25 CAPSULE ORAL at 08:41

## 2022-10-01 RX ADMIN — QUETIAPINE FUMARATE 50 MG: 50 TABLET ORAL at 21:59

## 2022-10-01 RX ADMIN — ACETAMINOPHEN 650 MG: 325 TABLET, FILM COATED ORAL at 21:58

## 2022-10-01 RX ADMIN — GABAPENTIN 100 MG: 100 CAPSULE ORAL at 21:59

## 2022-10-01 RX ADMIN — LEVETIRACETAM 750 MG: 100 SOLUTION ORAL at 22:32

## 2022-10-01 ASSESSMENT — ACTIVITIES OF DAILY LIVING (ADL)
ADLS_ACUITY_SCORE: 30
ADLS_ACUITY_SCORE: 33
ADLS_ACUITY_SCORE: 29
ADLS_ACUITY_SCORE: 29
ADLS_ACUITY_SCORE: 37
ADLS_ACUITY_SCORE: 29
ADLS_ACUITY_SCORE: 30
ADLS_ACUITY_SCORE: 29

## 2022-10-01 NOTE — PLAN OF CARE
Goal Outcome Evaluation:                    Patient A&0x4. Up with SBA GB and walker. She is anxious.  TF is clamped. TF runs from 2000 - 0800 at 50ml.  G -tube in place is CDI. Patient continues to c/o pain at G-tube site. MD aware.  Neuro's intact.  C/o ongoing HA. MD aware. Using ice and heat packs as well as PRN Tylenol and Ultram.  ISO for MRSA and C-diff. Full code. Awaiting placement.

## 2022-10-01 NOTE — CONSULTS
"Triage and Transition - Consult and Liaison     Julisa Chaney  October 1, 2022    Session start: 0918  Session end: 0936  Session duration in minutes: 18 minutes  [facility] utilized: [id number] - Psychotherapy (with patient) - 30 (16-37*) min  Patient was seen virtually ([employer] cart or other teleconferencing device).  Anticipated number of sessions or this episode of care: 1-4    Diagnosis:     Adjustment Disorders  309.28 (F43.23) With mixed anxiety and depressed mood, present;    296.32 (F33.1) Major Depressive Disorder, Recurrent Episode, Moderate _ and With melancholic features, by history;     Plan/Recommendations:     Continue care coordination with care team.    Maintain current transition plan.    Consult remains pending as psychiatry medication consults will resume on 10/3/22. Please do not add additional consult orders for psychiatry as it will then appear that the consult has been waiting a shorter time than then when the original order was placed.     Reason for consult: Julisa is 77 year old White  female . Psychiatry consult was requested due to anxiety. Patient was seen by [facility] Consult & Liaison team.     Presenting problem: Admitted to [facility] by [facility] on 7/19/2022 for severe sepsis suspected due to COVID infection and suspected bacterial pneumonia. 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 Patent has struggled with delirium throughout hospitalization, as well as persistent anxiety and depression.     Session Summary: Writer met with Ms. Chaney today via telehealth.  She notes that the past few days she has been \"very sad\".  Notes \"my mind. My brain. I get so out of control. I can't do anything\". Notes feeling as if no one believes her and that no one likes her.  She endorses constant " "pain in the top of her head on the skin and by her tube feeding site.  She reports that she \"I need something to calm my mind\". States numerous times that her pain in unmanaged and this is what is triggering her passive thoughts of suicide. Notes she has no plan or intent to act on these thoughts. Notes specifically that \"My Oriental orthodox will not allow me to act on these thoughts.\"  Feels she is more and more depressed being in the hospital. She reports feeling mistreated by staff and that she is not being told about changes. She does not have insight or wish to speak about if this is more about a barrier due to recently noted cognitive limitations.  She was not open to having staff write down changes stating 'they will never do it anyway'.  She is pessimistic in attempting to problem solve. She demonstrates irritability when talking with Writer. When engaging with Writer about wanting medications she states \"Don't just say mmmhhmm. Do something about it\".  She continues to focus on obtaining Tylenol 3 noting \" I know I sound like a drug addict but I need something. They gave it to me on Friday and then on Saturday it was gone.\"     She demonstrates racing thoughts, increased irritability, restlessness and worry during interaction today that is congruent with her reports of feeling anxious.      Mental Status Exam   Affect: Flat and Labile  Appearance: Appropriate   Attention Span/Concentration: Attentive    Eye Contact: Variable  Fund of Knowledge: Other: variable   Language /Speech Content: Fluent  Language /Speech Volume: Normal   Language /Speech Rate/Productions: Normal   Recent Memory: Variable  Remote Memory: Variable  Mood: Angry, Anxious, Depressed and Irritable   Orientation:   Person: Yes   Place: Yes  Time of Day: Yes   Date: No   Situation (Do they understand why they are here?): No   Psychomotor Behavior: Agitated   Thought Content: Clear  Thought Form: Intact    Current medications:   Current " Facility-Administered Medications   Medication     acetaminophen (TYLENOL) Suppository 650 mg     acetaminophen (TYLENOL) tablet 650 mg     banatrol plus packet 1 packet     carboxymethylcellulose PF (REFRESH PLUS) 0.5 % ophthalmic solution 1 drop     cholestyramine (QUESTRAN) Packet 4 g     glucose gel 15-30 g    Or     dextrose 50 % injection 25-50 mL    Or     glucagon injection 1 mg     guaiFENesin (ROBITUSSIN) 20 mg/mL solution 10 mL     ipratropium - albuterol 0.5 mg/2.5 mg/3 mL (DUONEB) neb solution 3 mL     levETIRAcetam (KEPPRA) solution 750 mg     Lidocaine (LIDOCARE) 4 % Patch 1 patch     lidocaine patch in PLACE     melatonin tablet 1-3 mg     menthol-zinc oxide (CALMOSEPTINE) 0.44-20.6 % ointment OINT     metoprolol (LOPRESSOR) injection 5 mg     miconazole with skin protectant (MARLYS ANTIFUNGAL) 2 % cream     miconazole with skin protectant (MARLYS ANTIFUNGAL) 2 % cream     mirtazapine (REMERON) tablet 15 mg     multivitamins w/minerals liquid 15 mL     naloxone (NARCAN) injection 0.2 mg    Or     naloxone (NARCAN) injection 0.4 mg    Or     naloxone (NARCAN) injection 0.2 mg    Or     naloxone (NARCAN) injection 0.4 mg     nortriptyline (PAMELOR) capsule 25 mg     ondansetron (ZOFRAN ODT) ODT tab 4 mg    Or     ondansetron (ZOFRAN) injection 4 mg     OXcarbazepine (TRILEPTAL) suspension 450 mg     pantoprazole (PROTONIX) 2 mg/mL suspension 40 mg     prochlorperazine (COMPAZINE) injection 5 mg    Or     prochlorperazine (COMPAZINE) tablet 5 mg    Or     prochlorperazine (COMPAZINE) suppository 12.5 mg     QUEtiapine (SEROquel) half-tab 12.5 mg     QUEtiapine (SEROquel) tablet 25 mg     QUEtiapine (SEROquel) tablet 50 mg     saccharomyces boulardii (FLORASTOR) capsule 250 mg     sodium chloride (PF) 0.9% PF flush 10-20 mL     sodium chloride (PF) 0.9% PF flush 10-40 mL     sodium chloride (PF) 0.9% PF flush 10-40 mL     topiramate (TOPAMAX) tablet 50 mg     traMADol (ULTRAM) half-tab 25 mg               Therapeutic intervention and progress:  Therapeutic intervention consisted of building therapeutic rapport and active listening. Patient is not making progress towards treatment goals as evidenced by limited ability to participate in psychotherapy, does not wish to participate in psychotherapy. Ms. Chaney is only interested in medication interventions at this time.     Collateral information:   Reviewed chart and coordinated with nursing. LM for a nursing to provide an update.      LORRI CARROLL MSW, [facility]  Triage and Transition - Consult and Liaison   [phone]

## 2022-10-01 NOTE — PROGRESS NOTES
CALORIE COUNT      Approximate Oral Intake for:    Calories: 646 kcal  Protein: 20 grams    TF + PO = 1666 kcal and 61 grams protein      Number of Meals Recorded: 3    Number of Snacks Recorded: 1      Vital 1.5 @ 50 mL/hr x12 hours (8 pm - 8 am) + 1 pkt Banatrol TID     TF providin kcal, 41 g protein      Dosing Weight 48.3 kg ( - admit wt)    Estimated Needs:    Estimated Energy Needs: 6755-5623 kcals (35-40 Kcal/Kg)  Justification: repletion and underweight  Estimated Protein Needs: 70-95 grams protein (1.5-2 g pro/Kg)  Justification: repletion       Summary:     Pt met 38% of energy needs and 29% of protein needs via PO intake alone  Pet met 98% of energy needs and 87% of protein needs via PO + TF intake    Calorie count to continue      Jaylin Matos RD, LD

## 2022-10-01 NOTE — PLAN OF CARE
Goal Outcome Evaluation:      Pt here with Brain mass. A&O x4. Neuros: Generalized weakness, frequent headaches. VSS. Not on Tele. Easy to chew diet, thin liquids. Tube feeding from 8pm to 8am at 50 mL/Hr and 100 mL Q6hrs of free water flushes via pump. Takes pills whole with water, other liquid meds and powders via tube feeding. Up with A1 GB W. Headaches, neck pain, lower back pain, and abdominal pain complains, PRNs given and somehow effective. Pt scoring green on the Aggression Stop Light Tool. Plan to discharge to a TCU, awaiting placement.

## 2022-10-01 NOTE — PLAN OF CARE
Speech Language Therapy Discharge Summary    Reason for therapy discharge:    All goals and outcomes met, no further needs identified.    Progress towards therapy goal(s). See goals on Care Plan in Epic electronic health record for goal details.  Goals met    Therapy recommendation(s):    No further therapy is recommended.Patient has met her swallow goals and is currently tolerating an IDDSI level 7 easy to chew and thin liquids. Patient wishes to remain on this diet due to lack of dentition. Continue with calorie counts for removal of her feeding tube if meeting her needs.

## 2022-10-01 NOTE — PROGRESS NOTES
[facility]    Internal Medicine Hospitalist Progress Note  10/01/2022  I evaluated patient on the above date.    Pablito Medeiros Jr., MD  [phone] (p)  Text Page  Vocera        Assessment & Plan New actions/orders today (10/01/2022) are underlined.    Julisa Chaney is a 77 year old female with history including trigeminal neuralgia, who was initially admitted to [facility] on 7/19/2022 for severe sepsis due to COVID-19 infection and bacterial pneumonia. Treated with 5 days of remdesivir (did not require any steroids) as well as  IV meropenem and IV vancomycin. Hospital course complicated by an acute and prolonged metabolic encephalopathy. Ultimately, head CT 7/25/2022 showed a possible left frontal mass causing vasogenic edema. 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 Antibiotics switched to IV vancomycin, cefepime and metronidazole. Transferred to [facility] 7/27/2022 for neurosurgical assessment.    Underwent left frontal ventriculostomy on 7/31/2022. Developed C. difficile while on antibiotics requiring rectal tube and a prolonged vanco course. Significant dysphagia and s/p g-tube 8/16. Mental status slowly improved. Hospital course prolonged with discharge disposition.       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  * Initial admission at [facility] 7/19 and transfer to [facility] 7/27 as above.  * Continued on broad antibiotics on admit.  * Neurosurgery, Neurology, ID and Oncology consulted.  * EEG 7/26 showed left frontotemporal region slowing with intermittent sharp wave activity; 48 hrs EEG recording showed severe diffuse nonspecific encephalopathy with irritability over the posterior head regions.  * MRI brain 7/30 showed ventriculitis with probable abscess.   * Underwent above surgery on 7/31; cultures no growth.  * Brain, CSF flow cytometry 8/1 did not  show any evidence of malignancy.  * EVD removed 8/8.  * Antibiotics narrowed to meropenem and vancomycin 8/13.  * Head CT 8/19 showed stable presumed vasogenic edema in the white matter of the anterior inferior left frontal lobe related to ventriculitis of the left lateral ventricle; stable ventriculomegaly of the left lateral ventricle.  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  * Given slow improvement, Hospitalist team had briefly discussed palliative care with patient's daughter on 9/6/22; they wanted restorative care at this time.  * Completed 6 weeks IV antibiotics on 9/8.  - Monitor clinically.  - Continue PRN acetaminophen, PRN tramadol; minimize opioids as able.    Acute infectious and metabolic encephalopathy due to above, resolved.  Concern for cognitive impairment with slums 18/30.  * Initial presentation as above.  * Treatment of brain abscess as above.  * Mentation subsequently much improved overall; however, fluctuating at times and will not engage in conversation most of the times.  * OT - Slums assessment 18/30 on 9/26.  - Continue to other treat issues as noted.  - Re-orient as needed.  - Maintain normal day/night, sleep/wake cycles.  - Minimize sedating medications as able.  - Neurocognitive assessment as outpatient.    Severe dysphagia.  Severe malnutrition related to acute on chronic medical disease.  * Pt with significant dysphagia and severe malnutrition (see Nutrition notes) that required a feeding tube.  * G-tube placed 8/16.  * Diet able to be advanced.  * Pt with persistent complaints of discomfort relating to g-tube.  * AXR 9/28 negative for acute findings.  - Continue dysphagia 7 diet with thin liquids.  - Continue TF's.  - With regard to  eventually discontinuing the g-tube, per d/w Nutrition 9/30, pt making progress and is close to being able to maintain nutrition on her own and titrating down the TF's.    C. difficile colitis, treated.  * Patient was noted to have copious diarrhea on 8/1; testing positive for C. Difficile. Started on PO vanco 8/2.  * Required rectal tube.  * Rectal tube was discontinued 8/28.   * Extended course of vancomycin given other antibiotics for brain abscess.  * Completed treatment with oral vancomycin 9/23.  - Continue saccharomyces boulardii probiotic.  - Continue cholestyramine BID.    Acute on chronic anemia, multifactorial due to anemia of chronic disease + iron deficiency.  * Hgb on admission was 11.7.  * Hgb slowly trended down. Iron studies 7/30 suggested anemia of chronic disease + iron deficiency; vitamin B12 and folate not decreased.  * Received 1 unit PRBC on 8/8/22 for Hb 6.9.  * Received IV iron infusion x3 with first on 8/11-8/12.  * Hemoglobin subsequently stable around 10.  - Monitor hgb periodically as needed.     Depression/anxiety with suicidal thoughts and agitation.  * PTA on mirtazapine.  * With improvement in her mentation, she started becoming restless and agitated at times.  * PRN quetiapine started during this hospitalization.  * On 8/27, reported having suicidal thoughts; no plans/attempts. Initiated suicide precautions and sitter; evaluated by Psychiatry service; sitter subsequently discontinued.  * Scheduled quetiapine started 8/29 and subsequently titrated.  * On 9/30, again expressed passive suicidal ideations. Psychiatry re-consulted.  - Re-consulted Psychiatry 9/30, pending.  - Continue mirtazapine 15 mg at bedtime.  - Continue quetiapine 25 mg BID and 50 mg at bedtime; PRN quetiapine 12.5 mg BID.    Back pain, suspect exacerbated by immobility.   - Continue lidocaine patches, PRN acetaminophen, PRN tramadol; minimize opioids as able.    Trigeminal neuralgia.  * PTA on levetiracetam,  nortriptyline, oxcarbazepine, topiramate.  * Levetiracetam increased at [facility] in the setting of brain abscess.  * Neurology consulted 9/29 regarding possibly decreasing levetiracetam dose.  * Discussed with Neurology 9/30 and levetiracetam decreased.  - Continue levetiracetam 750 mg BID (decreased 9/30), nortriptyline, oxcarbazepine, topiramate.  - Appreciate help from Neurology regarding possibly decreasing levetiracetam dose.    GERD.  History of duodenal stricture.  History of pyloric ulcer.  * EGD 2020 showed gastroduodenitis with a duodenal stricture.  - Continue pantoprazole.    Weakness and physical deconditioning due to multiple acute and chronic medical issues.  - Continue PT and OT.  - Plan TCU.       Hyperglycemia, suspect stress induced, resolved.  * HbA1c was 5.6 on 7/19/2022.    * Required ISS this hospitalization, subsequently stopped.    Hypokalemia, hypophosphatemia, resolved.  * Potassium and phosphorus low at times this hospitalization. Treated with replacement.    Hypernatremia, resolved.  Hyponatremia, resolved.  * Had hypernatremia this hospitalization and was started on free water flushes.    * Had hyponatremia and water flushed decreased.  * Sodium subsequently normalized.  - Monitor BMP periodically.        Clinically Significant Risk Factors Present on Admission                        COVID-19 testing.  COVID-19 PCR Results    COVID-19 PCR Results 2/7/22 7/19/22   SARS CoV2 PCR Negative Positive (A)   (A) Abnormal value       Comments are available for some flowsheets but are not being displayed.         COVID-19 Antibody Results, Testing for Immunity    COVID-19 Antibody Results, Testing for Immunity   No data to display.             Diet: Snacks/Supplements Adult: Other; L: mota Magic Cup, PRN sup w/ dinner; With Meals  Room Service  Combination Diet Easy to Chew (level 7); Thin Liquids (level 0) (pt prefers straws)  Calorie Counts  Adult Formula Drip Feeding: Continuous Vital  1.5; Gastrostomy; Goal Rate: 50 mL/hr x 12 hrs (8pm-8am); mL/hr; Medication - Feeding Tube Flush Frequency: At least 15-30 mL water before and after medication administration and with tube clogging; Estella...  Snacks/Supplements Adult: Gelatein Plus; With Meals    Prophylaxis: PCD's, ambulation.   Abrams Catheter: Not present  Central Lines: None  Code Status: Full Code    Disposition Plan   Expected discharge: Recommended to transitional care unit pending bed availability.  Entered: Pablito Medeiros MD 10/01/2022, 10:13 AM         Interval History    Doing about the same.  Still with dizziness, uncertain if better with med changes.    -Data reviewed today: I reviewed all new labs and imaging over the last 24 hours. I personally reviewed no images or EKG's today.    Physical Exam    , Blood pressure 124/61, pulse 88, temperature 98.2  F (36.8  C), temperature source Oral, resp. rate 16, weight 54.6 kg (120 lb 5.9 oz), SpO2 98 %, not currently breastfeeding. O2 Device: None (Room air)    Vitals:    09/14/22 0616 09/20/22 0540 09/28/22 0601   Weight: 49 kg (108 lb) 58 kg (127 lb 13.9 oz) 54.6 kg (120 lb 5.9 oz)     Vital Signs with Ranges  Temp:  [98  F (36.7  C)-98.6  F (37  C)] 98.2  F (36.8  C)  Pulse:  [76-89] 88  Resp:  [16-18] 16  BP: (101-124)/(50-61) 124/61  SpO2:  [96 %-98 %] 98 %  Patient Vitals for the past 24 hrs:   BP Temp Temp src Pulse Resp SpO2   10/01/22 0748 124/61 98.2  F (36.8  C) Oral 88 16 98 %   10/01/22 0102 108/56 98.1  F (36.7  C) Oral 76 16 96 %   09/30/22 1951 107/57 98.6  F (37  C) Oral 84 16 97 %   09/30/22 1600 124/55 98  F (36.7  C) Oral 89 18 98 %   09/30/22 1115 101/50 98.4  F (36.9  C) Oral 81 18 98 %     I/O's Last 24 hours  No intake/output data recorded.    Constitutional: Awake, alert, flat affect.  Respiratory: Diminished in bases. No crackles or wheezes.  Cardiovascular: RRR, no m/r/g.  GI: Soft, nd, nt to light touch, +BS.  Skin/Integumen:   Other:        Data   Recent Labs    Lab 10/01/22  0757 09/30/22  0836 09/29/22  0832 09/28/22  0855 09/26/22  0800 09/26/22  0800 09/25/22  1248   NA  --   --   --   --   --  136  --    POTASSIUM  --  4.4 3.7 4.1   < > 4.2  --    CHLORIDE  --   --   --   --   --  104  --    CO2  --   --   --   --   --  24  --    BUN  --   --   --   --   --  18  --    CR  --   --   --   --   --  0.52  --    ANIONGAP  --   --   --   --   --  8  --    ADY  --   --   --   --   --  8.9  --    *  --   --   --   --  144* 92    < > = values in this interval not displayed.     Recent Labs   Lab Test 10/01/22  0757 09/26/22  0800 09/25/22  1248 09/25/22  0819 09/24/22  2200   * 144* 92 124* 100*     No results for input(s): WBC, CRP, DD, LDH, FIBR, PAOLA, IL6B, LACT, LACTS, PCAL in the last 168 hours.      No results found for this or any previous visit (from the past 24 hour(s)).    Medications   All medications were reviewed.      banatrol plus  1 packet Per Feeding Tube TID w/meals     cholestyramine  1 packet Oral BID     levETIRAcetam  750 mg Oral or Feeding Tube Q12H     lidocaine  1 patch Transdermal Q24H     lidocaine   Transdermal Q8H MARCIA     miconazole with skin protectant   Topical BID     mirtazapine  15 mg Oral or Feeding Tube At Bedtime     multivitamins w/minerals  15 mL Per Feeding Tube Daily     nortriptyline  25 mg Oral BID     OXcarbazepine  450 mg Oral or Feeding Tube At Bedtime     pantoprazole  40 mg Oral or Feeding Tube QAM AC     QUEtiapine  25 mg Oral BID     QUEtiapine  50 mg Oral At Bedtime     saccharomyces boulardii  250 mg Per Feeding Tube BID     sodium chloride (PF)  10-40 mL Intracatheter Q8H     sodium chloride (PF)  10-40 mL Intracatheter Q7 Days     topiramate  50 mg Oral or Feeding Tube At Bedtime     [DISCONTINUED] acetaminophen **OR** acetaminophen, acetaminophen, artificial tears, glucose **OR** dextrose **OR** glucagon, guaiFENesin, ipratropium - albuterol 0.5 mg/2.5 mg/3 mL, melatonin, menthol-zinc oxide, metoprolol,  miconazole with skin protectant, naloxone **OR** naloxone **OR** naloxone **OR** naloxone, ondansetron **OR** ondansetron, prochlorperazine **OR** prochlorperazine **OR** prochlorperazine, QUEtiapine, sodium chloride (PF), traMADol

## 2022-10-01 NOTE — CONSULTS
Initial Psychiatric Consult   Consult date: September 19, 2022         Reason for Consult, requesting source:    Medication follow up  Requesting source: Dr. Medeiros             HPI:       Julisa Chaney is a pleasant 77 year old woman with past medical history that is most notable for trigeminal neuralgia, among others; who [facility] for severe sepsis suspected due to COVID infection and suspected bacterial pneumonia. 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 During course of hospitalization she had developed delirium which is improving.     I met with Julisa in her room where she continues to convalesce on station 77. She is demoralized about her length of admission and slow progress. She reports she is depressed due to the situation. She is very anxious. She gets frustrated when asked to elaborate more on severity and character of her symptoms. She is not sleeping that well due to pain. She says that she would do better if she had Tyelol 3 once or twice a day as she used to take before apparently for decades. She notes a subjective belief that she would need to taper off it. She denies suicidal planning or intent. She notes she would not act on the passive thoughts she sometimes is getting. She wants to get better.         Physical ROS:   The 10 point Review of Systems is negative other than noted in the HPI or here.           Medications:       banatrol plus  1 packet Per Feeding Tube TID w/meals     cholestyramine  1 packet Oral BID     levETIRAcetam  750 mg Oral or Feeding Tube Q12H     lidocaine  1 patch Transdermal Q24H     lidocaine   Transdermal Q8H MARCIA     miconazole with skin protectant   Topical BID     mirtazapine  15 mg Oral or Feeding  Tube At Bedtime     multivitamins w/minerals  15 mL Per Feeding Tube Daily     nortriptyline  25 mg Oral BID     OXcarbazepine  450 mg Oral or Feeding Tube At Bedtime     pantoprazole  40 mg Oral or Feeding Tube QAM AC     QUEtiapine  25 mg Oral BID     QUEtiapine  50 mg Oral At Bedtime     saccharomyces boulardii  250 mg Per Feeding Tube BID     sodium chloride (PF)  10-40 mL Intracatheter Q8H     sodium chloride (PF)  10-40 mL Intracatheter Q7 Days     topiramate  50 mg Oral or Feeding Tube At Bedtime            Physical and Psychiatric Examination:     /50 (BP Location: Left arm)   Pulse 73   Temp 98.5  F (36.9  C) (Oral)   Resp 16   Wt 54.6 kg (120 lb 5.9 oz)   SpO2 97%   BMI 20.03 kg/m    Weight is 120 lbs 5.94 oz  Body mass index is 20.03 kg/m .    Physical Exam:  I have reviewed the physical exam as documented by by the medical team and agree with findings and assessment and have no additional findings to add at this time.    Mental Status Exam:  Appearance: awake, alert and unkempt  Attitude:  cooperative  Eye Contact good  Mood:  Depressed, anxious  Affect:  mood congruent and intensity is blunted, slightly irritable  Speech:  clear, coherent  Psychomotor Behavior:  no evidence of tardive dyskinesia, dystonia, or tics and fidgeting  Throught Process:  linear and goal oriented  Associations:  no loose associations  Thought Content:  no evidence of psychotic thought and passive suicidal ideation present  Insight:  limited  Judgement:  limited  Oriented to:  self, place, partially to time, partially to situation  Attention Span and Concentration:  fair  Recent and Remote Memory:  limited   Language: able to name/identify objects without impairment  Fund of Knowledge: intact with awareness of current and past events             DSM-5 Diagnosis:   Delirium, ongoing  Major depressive disorder, recurrent episode, severe, with anxious distress  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  Acute infectious and metabolic encephalopathy due to above, resolved.  Concern for cognitive impairment with slums 18/30.  Severe dysphagia.  Severe malnutrition related to acute on chronic medical disease.        Assessment:   Julisa continues to report severe anxiety and depression with passive suicidal ideation. These remain passive and she is confident she would not act on thoughts. She is struggling recovering from very serious neurological and medical co morbidities though previously suffered from anxiety and depression before this as well. She is working on increasing PO intake with poor appetite and ongoing depression.          Summary of Recommendations:   1. Continue Mirtazapine 15mg. Considered dose increase but patient is quite focused on pain and anxiety. This can be the next step. But for now reviewed with Dr. Medeiros. Patient believed that going back to Tyelnol 3 would help her generally quite a bit so he was going to try swapping that out for The tramadol she had PRN. I will add gabapentin 100 mg TID PRN for anxiety/pain as well. Patient will need to continue to be monitored closely for falls given complex medication regimen. Reviewed with patient she already has polypharacy risks and further changes generally have risks of falls, sedation, medication interactions, arrythmias, etc.  2. Continue quetiapine 25mg BID (morning and afternoon) in addition to nightly dose of 50mg.   3. She remains on nortriptyline, oxcarbazapine, and Keppra I believe all for her occipital neuralgia.     4. Please reconsult psychiatry as needed        Sage Turner MD  Consult/Liaison Psychiatry   [facility]    Contact information available via [facility] Paging/Directory  If I am not available, then [facility]  line ([phone]) should know who is covering our consult service.            \"This dictation was performed with voice recognition software and may contain errors,  omissions and " "inadvertent word substitution.\"           "

## 2022-10-01 NOTE — PLAN OF CARE
A&O x4, neuros unchanged. VSS.  Received cold and hot packs for abdominal pain and headaches along with PRNs. Some anxiety, patient otherwise withdrawn, flat affect.    Easy to chew diet, thin liquids. Calorie count. Takes pills whole one at a time. PEG tube in place, TF from 2000 - 0800; rate decreased, now running at 50ml/hr with Q6h 100 ml FWF. Up to bathroom with Ax1, GB/W.      Discharge to TCU pending. Psychiatry re-consulted, will attempt to see patient today.

## 2022-10-02 PROCEDURE — 250N000013 HC RX MED GY IP 250 OP 250 PS 637: Performed by: INTERNAL MEDICINE

## 2022-10-02 PROCEDURE — 250N000013 HC RX MED GY IP 250 OP 250 PS 637: Performed by: HOSPITALIST

## 2022-10-02 PROCEDURE — 250N000013 HC RX MED GY IP 250 OP 250 PS 637

## 2022-10-02 PROCEDURE — 120N000001 HC R&B MED SURG/OB

## 2022-10-02 PROCEDURE — 99232 SBSQ HOSP IP/OBS MODERATE 35: CPT | Performed by: PSYCHIATRY & NEUROLOGY

## 2022-10-02 PROCEDURE — 99232 SBSQ HOSP IP/OBS MODERATE 35: CPT | Performed by: INTERNAL MEDICINE

## 2022-10-02 RX ADMIN — Medication 15 ML: at 08:09

## 2022-10-02 RX ADMIN — LIDOCAINE 1 PATCH: 560 PATCH PERCUTANEOUS; TOPICAL; TRANSDERMAL at 08:13

## 2022-10-02 RX ADMIN — ACETAMINOPHEN 650 MG: 325 TABLET, FILM COATED ORAL at 06:39

## 2022-10-02 RX ADMIN — LEVETIRACETAM 750 MG: 100 SOLUTION ORAL at 08:09

## 2022-10-02 RX ADMIN — Medication 1 PACKET: at 11:36

## 2022-10-02 RX ADMIN — ACETAMINOPHEN AND CODEINE PHOSPHATE 1 TABLET: 300; 30 TABLET ORAL at 03:25

## 2022-10-02 RX ADMIN — ACETAMINOPHEN AND CODEINE PHOSPHATE 1 TABLET: 300; 30 TABLET ORAL at 09:29

## 2022-10-02 RX ADMIN — NORTRIPTYLINE HYDROCHLORIDE 25 MG: 25 CAPSULE ORAL at 20:22

## 2022-10-02 RX ADMIN — QUETIAPINE FUMARATE 50 MG: 50 TABLET ORAL at 22:27

## 2022-10-02 RX ADMIN — LEVETIRACETAM 750 MG: 100 SOLUTION ORAL at 20:21

## 2022-10-02 RX ADMIN — CHOLESTYRAMINE 4 G: 4 POWDER, FOR SUSPENSION ORAL at 08:09

## 2022-10-02 RX ADMIN — NORTRIPTYLINE HYDROCHLORIDE 25 MG: 25 CAPSULE ORAL at 08:08

## 2022-10-02 RX ADMIN — ACETAMINOPHEN AND CODEINE PHOSPHATE 1 TABLET: 300; 30 TABLET ORAL at 22:27

## 2022-10-02 RX ADMIN — MIRTAZAPINE 15 MG: 15 TABLET, FILM COATED ORAL at 22:27

## 2022-10-02 RX ADMIN — QUETIAPINE FUMARATE 25 MG: 25 TABLET ORAL at 15:21

## 2022-10-02 RX ADMIN — Medication 1 PACKET: at 17:00

## 2022-10-02 RX ADMIN — Medication 1 PACKET: at 08:13

## 2022-10-02 RX ADMIN — Medication 40 MG: at 08:09

## 2022-10-02 RX ADMIN — OXCARBAZEPINE 450 MG: 300 SUSPENSION ORAL at 22:27

## 2022-10-02 RX ADMIN — MICONAZOLE NITRATE: 20 CREAM TOPICAL at 08:11

## 2022-10-02 RX ADMIN — TOPIRAMATE 50 MG: 50 TABLET ORAL at 22:27

## 2022-10-02 RX ADMIN — Medication 250 MG: at 08:08

## 2022-10-02 RX ADMIN — QUETIAPINE FUMARATE 25 MG: 25 TABLET ORAL at 08:08

## 2022-10-02 RX ADMIN — ACETAMINOPHEN AND CODEINE PHOSPHATE 1 TABLET: 300; 30 TABLET ORAL at 15:21

## 2022-10-02 RX ADMIN — ACETAMINOPHEN 650 MG: 325 TABLET, FILM COATED ORAL at 20:21

## 2022-10-02 RX ADMIN — ANORECTAL OINTMENT: 15.7; .44; 24; 20.6 OINTMENT TOPICAL at 08:10

## 2022-10-02 RX ADMIN — Medication 250 MG: at 20:22

## 2022-10-02 ASSESSMENT — ACTIVITIES OF DAILY LIVING (ADL)
ADLS_ACUITY_SCORE: 30
ADLS_ACUITY_SCORE: 29
ADLS_ACUITY_SCORE: 33
ADLS_ACUITY_SCORE: 29
ADLS_ACUITY_SCORE: 29
ADLS_ACUITY_SCORE: 30

## 2022-10-02 NOTE — CONSULTS
Initial Psychiatric Consult   Consult date: September 19, 2022         Reason for Consult, requesting source:    Medication follow up  Requesting source: Dr. Medeiros             HPI:       Julisa Chaney is a pleasant 77 year old woman with past medical history that is most notable for trigeminal neuralgia, among others; who was admitted to [facility] on 7/19/2022 for severe sepsis suspected due to COVID infection and suspected bacterial pneumonia. Her course has been complicated by prolonged severe acute metabolic encephalopathy and she is now found to have suspected intracerebral abscess causing acute ventriculitis, she was transferred to [facility] on 7/27 and underwent left frontal ventriculostomy on 7/31. During course of hospitalization she had developed delirium which is improving.     Update 10/2/22  I met with Julisa today to follow up from yesterday. Nursing reports she is doing reasonably well. No agitation issues. No SI reported.    On interview Julisa reports she is feeling much better. Since restarting Tylenol 3 she is convinced she is feeling better physically, and mentally. Believes she is less depressed and anxious. Reports she is not having any suicidal thoughts. Nursing reports she did well eating breakfast this morning. Dietician documentation indicates she has made some progress in PO calories in recently. THey hope to titrate down on tube feedings it appears. Patient states she is getting up to go to the bathroom. She indicates she is willing to try PT to improve strength.              Physical ROS:   The 10 point Review of Systems is negative other than noted in the HPI or here.           Medications:       banatrol plus  1 packet Per Feeding Tube TID w/meals     cholestyramine  1 packet Oral BID     levETIRAcetam  750 mg Oral or Feeding Tube Q12H     lidocaine  1 patch Transdermal Q24H     lidocaine   Transdermal Q8H MARCIA     miconazole with skin protectant   Topical BID      mirtazapine  15 mg Oral or Feeding Tube At Bedtime     multivitamins w/minerals  15 mL Per Feeding Tube Daily     nortriptyline  25 mg Oral BID     OXcarbazepine  450 mg Oral or Feeding Tube At Bedtime     pantoprazole  40 mg Oral or Feeding Tube QAM AC     QUEtiapine  25 mg Oral BID     QUEtiapine  50 mg Oral At Bedtime     saccharomyces boulardii  250 mg Per Feeding Tube BID     sodium chloride (PF)  10-40 mL Intracatheter Q8H     sodium chloride (PF)  10-40 mL Intracatheter Q7 Days     topiramate  50 mg Oral or Feeding Tube At Bedtime            Physical and Psychiatric Examination:     BP 92/49 (BP Location: Left arm)   Pulse 73   Temp 98.4  F (36.9  C) (Oral)   Resp 16   Wt 54.6 kg (120 lb 5.9 oz)   SpO2 96%   BMI 20.03 kg/m    Weight is 120 lbs 5.94 oz  Body mass index is 20.03 kg/m .    Physical Exam:  I have reviewed the physical exam as documented by by the medical team and agree with findings and assessment and have no additional findings to add at this time.    Mental Status Exam:  Appearance: awake, alert and unkempt  Attitude:  cooperative  Eye Contact good  Mood: Improvevd  Affect:  mood congruent and intensity is blunted, pleasant  Speech:  clear, coherent  Psychomotor Behavior:  no evidence of tardive dyskinesia, dystonia, or tics and fidgeting  Throught Process:  linear and goal oriented  Associations:  no loose associations  Thought Content:  no evidence of psychotic thought and passive suicidal ideation present  Insight: improing   Judgement:  improvijng  Oriented to:  self, place, partially to time, partially to situation  Attention Span and Concentration:  fair  Recent and Remote Memory: improving  Language: able to name/identify objects without impairment  Fund of Knowledge: intact with awareness of current and past events             DSM-5 Diagnosis:   Delirium, ongoing  Major depressive disorder, recurrent episode, severe, with anxious distress  Left lateral ventricular abscess with  "severe sepsis - s/p left frontal ventriculostomy 7/31/2022.  Acute infectious and metabolic encephalopathy due to above, resolved.  Concern for cognitive impairment with slums 18/30.  Severe dysphagia.  Severe malnutrition related to acute on chronic medical disease.        Assessment:   10/1/22  Julisa continues to report severe anxiety and depression with passive suicidal ideation. These remain passive and she is confident she would not act on thoughts. She is struggling recovering from very serious neurological and medical co morbidities though previously suffered from anxiety and depression before this as well. She is working on increasing PO intake with poor appetite and ongoing depression.    Update on 10/2/22  Patient is seemingly doing much better. She claims that having the Tyelonol 3 is very helpful for her mental health and treats her pain. I reviewed we will need to monitor for side effects like sedation, fall risk, impaired ability to function but she seems to be tolerating well so far. She is on a complicated psychiatric medication regimen and is reporting her depression and anxiety are improving.         Summary of Recommendations:   1. Continue Mirtazapine 15mg. Considered dose increase 2. Continue quetiapine 25mg BID (morning and afternoon) in addition to nightly dose of 50mg.   3. She remains on nortriptyline, oxcarbazapine, and Keppra I believe all for her occipital neuralgia.     4. Please reconsult psychiatry as needed        Sage Turner MD  Consult/Liaison Psychiatry   [facility]    Contact information available via [facility] Paging/Directory  If I am not available, then [facility] line ([phone]) should know who is covering our consult service.            \"This dictation was performed with voice recognition software and may contain errors,  omissions and inadvertent word substitution.\"           "

## 2022-10-02 NOTE — PLAN OF CARE
VSS. A/Ox4, able to express to needs. Nocturnal TF 8789-8411. PRN Tylenol #3, heat and cold packs for pain/discomfort. Able to rest comfortably. Assist of 1 to the bathroom. Plan for discharge to TCU when available. Will continue to monitor.    /76   Pulse 72   Temp 98.4  F (36.9  C) (Oral)   Resp 16   Wt 54.6 kg (120 lb 5.9 oz)   SpO2 99%   BMI 20.03 kg/m

## 2022-10-02 NOTE — PLAN OF CARE
Goal Outcome Evaluation:     Pt here with Brain mass. A&O x4. Neuros: Generalized weakness, frequent headaches. VSS. Not on Tele. Easy to chew diet, thin liquids. Tube feeding from 8pm to 8am at 50 mL/Hr and 100 mL Q6hrs of free water flushes via pump. Takes pills whole with water, other liquid meds and powders via tube feeding. Up with A1 GB W. Headaches, neck pain, lower back pain, and abdominal pain complains, PRNs, heat and cold packs given and somehow effective. Pt scoring green on the Aggression Stop Light Tool. Plan to discharge to a TCU, awaiting placement.

## 2022-10-02 NOTE — PROGRESS NOTES
CALORIE COUNT      Approximate Oral Intake for:  10/1  Calories: 858 kcal  Protein: 47 grams    TF + PO = 1878 kcal and 88 grams protein      Number of Meals Recorded: 3    Number of Snacks Recorded: 2 (supplements)      Vital 1.5 @ 50 mL/hr x12 hours (8 pm - 8 am) + 1 pkt Banatrol TID      TF providin kcal, 41 g protein        Dosing Weight: 48.3 kg ( - admit wt)     Estimated Needs:    Estimated Energy Needs: 3929-7212 kcals (35-40 Kcal/Kg)  Justification: repletion and underweight  Estimated Protein Needs: 70-95 grams protein (1.5-2 g pro/Kg)  Justification: repletion          Summary:     Pt met 50% of energy needs and 67% of protein needs via PO intake alone yesterday --> this is an improvement from .   Recommend pt able to meet >60% of energy and protein needs via PO intake before discontinuing TF    Pt met 100% of both energy and protein needs via PO + TF intake yesterday    Calorie counts to continue      Jaylin Matos RD, LD

## 2022-10-02 NOTE — PROGRESS NOTES
[facility]    Internal Medicine Hospitalist Progress Note  10/02/2022  I evaluated patient on the above date.    Pablito Medeiros Jr., MD  [phone] (p)  Text Page  Vocera        Assessment & Plan New actions/orders today (10/02/2022) are underlined.    Julisa Chaney is a 77 year old female with history including trigeminal neuralgia, who was initially admitted to [facility] on 7/19/2022 for severe sepsis due to COVID-19 infection and bacterial pneumonia. Treated with 5 days of remdesivir (did not require any steroids) as well as  IV meropenem and IV vancomycin. Hospital course complicated by an acute and prolonged metabolic encephalopathy. Ultimately, head CT 7/25/2022 showed a possible left frontal mass causing vasogenic edema. 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 Antibiotics switched to IV vancomycin, cefepime and metronidazole. Transferred to [facility] 7/27/2022 for neurosurgical assessment.    Underwent left frontal ventriculostomy on 7/31/2022. Developed C. difficile while on antibiotics requiring rectal tube and a prolonged vanco course. Significant dysphagia and s/p g-tube 8/16. Mental status slowly improved. Hospital course prolonged with discharge disposition.       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  * Initial admission at [facility] 7/19 and transfer to [facility] 7/27 as above.  * Continued on broad antibiotics on admit.  * Neurosurgery, Neurology, ID and Oncology consulted.  * EEG 7/26 showed left frontotemporal region slowing with intermittent sharp wave activity; 48 hrs EEG recording showed severe diffuse nonspecific encephalopathy with irritability over the posterior head regions.  * MRI brain 7/30 showed ventriculitis with probable abscess.   * Underwent above surgery on 7/31; cultures no growth.  * Brain, CSF flow cytometry 8/1 did not  show any evidence of malignancy.  * EVD removed 8/8.  * Antibiotics narrowed to meropenem and vancomycin 8/13.  * Head CT 8/19 showed stable presumed vasogenic edema in the white matter of the anterior inferior left frontal lobe related to ventriculitis of the left lateral ventricle; stable ventriculomegaly of the left lateral ventricle.  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  * Given slow improvement, Hospitalist team had briefly discussed palliative care with patient's daughter on 9/6/22; they wanted restorative care at this time.  * Completed 6 weeks IV antibiotics on 9/8.  * On 10/1, PRN tramadol change to acetaminophen-codeine (pt taking this PTA).  - Monitor clinically.  - Continue PRN acetaminophen, PRN acetaminophen-codeine 300-30 mg q6h; minimize opioids as able.    Acute infectious and metabolic encephalopathy due to above, resolved.  Concern for cognitive impairment with slums 18/30.  * Initial presentation as above.  * Treatment of brain abscess as above.  * Mentation subsequently much improved overall; however, fluctuating at times and will not engage in conversation most of the times.  * OT - Slums assessment 18/30 on 9/26.  - Continue to other treat issues as noted.  - Re-orient as needed.  - Maintain normal day/night, sleep/wake cycles.  - Minimize sedating medications as able.  - Neurocognitive assessment as outpatient.    Severe dysphagia.  Severe malnutrition related to acute on chronic medical disease.  Pain related to g-tube.  * Pt with significant dysphagia and severe malnutrition (see Nutrition notes) that required a feeding tube.  * G-tube placed 8/16.  * Diet able to be advanced.  * Pt with persistent complaints of discomfort relating to g-tube.  *  AXR 9/28 negative for acute findings.  * On 10/1, pt continued to complain of g-tube site pain.  * On 10/2, d/w pt regarding possible removal; however, she said she may not be able to take medications orally due to the taste.  - Continue dysphagia 7 diet with thin liquids.  - Continue TF's.  - Have pt try to take her medications orally in preparation for eventual g-tube removal.  - Will d/w IR 10/3 regarding timing of g-tube removal if pt is able to tolerate her PO meds.    C. difficile colitis, treated.  * Patient was noted to have copious diarrhea on 8/1; testing positive for C. Difficile. Started on PO vanco 8/2.  * Required rectal tube.  * Rectal tube was discontinued 8/28.   * Extended course of vancomycin given other antibiotics for brain abscess.  * Completed treatment with oral vancomycin 9/23.  - Continue saccharomyces boulardii probiotic.  - Try stopping cholestyramine BID.    Acute on chronic anemia, multifactorial due to anemia of chronic disease + iron deficiency.  * Hgb on admission was 11.7.  * Hgb slowly trended down. Iron studies 7/30 suggested anemia of chronic disease + iron deficiency; vitamin B12 and folate not decreased.  * Received 1 unit PRBC on 8/8/22 for Hb 6.9.  * Received IV iron infusion x3 with first on 8/11-8/12.  * Hemoglobin subsequently stable around 10.  - Monitor hgb periodically as needed.     Depression/anxiety with suicidal thoughts and agitation.  * PTA on mirtazapine.  * With improvement in her mentation, she started becoming restless and agitated at times.  * PRN quetiapine started during this hospitalization.  * On 8/27, reported having suicidal thoughts; no plans/attempts. Initiated suicide precautions and sitter; evaluated by Psychiatry service; sitter subsequently discontinued.  * Scheduled quetiapine started 8/29 and subsequently titrated.  * On 9/30, again expressed passive suicidal ideations. Psychiatry re-consulted.  * Seen by Psychiatry again 10/1; PRN gabapentin  added for anxiety; pt told Dr. Turner she was previously on acetaminophen-codeine for pain (trigeminal neuralgia) and was off of it this hospitalization, which was affecting her overall mood. PRN acetaminophen-codeine restarted 10/1 (see below).  * Mood improved 10/2.  - Continue mirtazapine 15 mg at bedtime.  - Continue PRN gabapentin 100 mg TID; quetiapine 25 mg BID and 50 mg at bedtime; PRN quetiapine 12.5 mg BID.  - Appreciate help form Psychiatry - I d/w Dr. Turner 10/2.    Back pain, suspect exacerbated by immobility.   - Monitor clinically.  - Continue lidocaine patches, PRN acetaminophen, PRN acetaminophen-codeine 300-30 mg q6h; minimize opioids as able.    Trigeminal neuralgia.  * PTA on levetiracetam, nortriptyline, oxcarbazepine, topiramate; PRN acetaminophen-codeine.  * Levetiracetam increased at [facility] in the setting of brain abscess.  * Neurology consulted 9/29 regarding possibly decreasing levetiracetam dose.  * Discussed with Neurology 9/30 and levetiracetam decreased.  * On 10/1, PRN tramadol change to acetaminophen-codeine (pt taking this PTA).  - Continue levetiracetam 750 mg BID (decreased 9/30), nortriptyline, oxcarbazepine, topiramate.  - Continue PRN acetaminophen-codeine.  - Appreciate help from Neurology - I d/w Dr. Jennings 9/30.    GERD.  History of duodenal stricture.  History of pyloric ulcer.  * EGD 2020 showed gastroduodenitis with a duodenal stricture.  - Continue pantoprazole.    Weakness and physical deconditioning due to multiple acute and chronic medical issues.  - Continue PT and OT.  - Plan TCU.     Hyperglycemia, suspect stress induced, resolved.  * HbA1c was 5.6 on 7/19/2022.    * Required ISS this hospitalization, subsequently stopped.    Hypokalemia, hypophosphatemia, resolved.  * Potassium and phosphorus low at times this hospitalization. Treated with replacement.    Hypernatremia, resolved.  Hyponatremia, resolved.  * Had hypernatremia this hospitalization and  "was started on free water flushes.    * Had hyponatremia and water flushed decreased.  * Sodium subsequently normalized.  - Monitor BMP periodically.        Clinically Significant Risk Factors Present on Admission                        COVID-19 testing.  COVID-19 PCR Results    COVID-19 PCR Results 2/7/22 7/19/22   SARS CoV2 PCR Negative Positive (A)   (A) Abnormal value       Comments are available for some flowsheets but are not being displayed.         COVID-19 Antibody Results, Testing for Immunity    COVID-19 Antibody Results, Testing for Immunity   No data to display.             Diet: Snacks/Supplements Adult: Other; L: mota Magic Cup, PRN sup w/ dinner; With Meals  Room Service  Combination Diet Easy to Chew (level 7); Thin Liquids (level 0) (pt prefers straws)  Calorie Counts  Adult Formula Drip Feeding: Continuous Vital 1.5; Gastrostomy; Goal Rate: 50 mL/hr x 12 hrs (8pm-8am); mL/hr; Medication - Feeding Tube Flush Frequency: At least 15-30 mL water before and after medication administration and with tube clogging; [place]...  Snacks/Supplements Adult: Gelatein Plus; With Meals    Prophylaxis: PCD's, ambulation.   Abrams Catheter: Not present  Central Lines: None  Code Status: Full Code    Disposition Plan   Expected discharge: Recommended to transitional care unit pending bed availability.  Entered: Pablito Medeiros MD 10/02/2022, 12:11 PM         Interval History    Feeling better today.  Says that restarting acetaminophen-codeine has really helped her pain (headache, trigeminal neuralgia) as well \"mentally\".  Still with pain around g-tube.  When I discussed possibly having it removed, she asked how she would get her pills as apparently she feels she may not be able to take them orally due to the taste.    -Data reviewed today: I reviewed all new labs and imaging over the last 24 hours. I personally reviewed no images or EKG's today.    Physical Exam    , Blood pressure 92/49, pulse 73, temperature 98.4 "  F (36.9  C), temperature source Oral, resp. rate 16, weight 54.6 kg (120 lb 5.9 oz), SpO2 96 %, not currently breastfeeding. O2 Device: None (Room air)    Vitals:    09/14/22 0616 09/20/22 0540 09/28/22 0601   Weight: 49 kg (108 lb) 58 kg (127 lb 13.9 oz) 54.6 kg (120 lb 5.9 oz)     Vital Signs with Ranges  Temp:  [98.4  F (36.9  C)-98.6  F (37  C)] 98.4  F (36.9  C)  Pulse:  [72-88] 73  Resp:  [16-28] 16  BP: ()/(49-76) 92/49  SpO2:  [95 %-100 %] 96 %  Patient Vitals for the past 24 hrs:   BP Temp Temp src Pulse Resp SpO2   10/02/22 1158 92/49 98.4  F (36.9  C) Oral 73 16 96 %   10/02/22 0742 101/53 98.6  F (37  C) Oral 83 16 99 %   10/02/22 0325 105/76 98.4  F (36.9  C) Oral 72 16 99 %   10/01/22 1922 120/55 98.5  F (36.9  C) Oral 88 28 100 %   10/01/22 1528 104/55 98.6  F (37  C) Oral 85 16 95 %     I/O's Last 24 hours  I/O last 3 completed shifts:  In: 300 [NG/GT:300]  Out: -     Constitutional: Awake, alert, oriented, brighter.  Respiratory: Diminished in bases. No crackles or wheezes.  Cardiovascular: RRR, no m/r/g.  GI: Soft, nd, nt to light touch, +BS; pt notes pain more internally.  Skin/Integumen:   Other:        Data   Recent Labs   Lab 10/01/22  1159 10/01/22  0757 09/30/22  0836 09/29/22  0832 09/28/22  0855 09/26/22  0800   NA  --   --   --   --   --  136   POTASSIUM  --   --  4.4 3.7 4.1 4.2   CHLORIDE  --   --   --   --   --  104   CO2  --   --   --   --   --  24   BUN  --   --   --   --   --  18   CR  --   --   --   --   --  0.52   ANIONGAP  --   --   --   --   --  8   ADY  --   --   --   --   --  8.9   * 114*  --   --   --  144*     Recent Labs   Lab Test 10/01/22  1159 10/01/22  0757 09/26/22  0800 09/25/22  1248 09/25/22  0819   * 114* 144* 92 124*     No results for input(s): WBC, CRP, DD, LDH, FIBR, PAOLA, IL6B, LACT, LACTS, PCAL in the last 168 hours.      No results found for this or any previous visit (from the past 24 hour(s)).    Medications   All medications were  reviewed.      banatrol plus  1 packet Per Feeding Tube TID w/meals     cholestyramine  1 packet Oral BID     levETIRAcetam  750 mg Oral or Feeding Tube Q12H     lidocaine  1 patch Transdermal Q24H     lidocaine   Transdermal Q8H MARCIA     miconazole with skin protectant   Topical BID     mirtazapine  15 mg Oral or Feeding Tube At Bedtime     multivitamins w/minerals  15 mL Per Feeding Tube Daily     nortriptyline  25 mg Oral BID     OXcarbazepine  450 mg Oral or Feeding Tube At Bedtime     pantoprazole  40 mg Oral or Feeding Tube QAM AC     QUEtiapine  25 mg Oral BID     QUEtiapine  50 mg Oral At Bedtime     saccharomyces boulardii  250 mg Per Feeding Tube BID     sodium chloride (PF)  10-40 mL Intracatheter Q8H     sodium chloride (PF)  10-40 mL Intracatheter Q7 Days     topiramate  50 mg Oral or Feeding Tube At Bedtime     [DISCONTINUED] acetaminophen **OR** acetaminophen, acetaminophen, acetaminophen-codeine, artificial tears, glucose **OR** dextrose **OR** glucagon, gabapentin, guaiFENesin, ipratropium - albuterol 0.5 mg/2.5 mg/3 mL, melatonin, menthol-zinc oxide, metoprolol, miconazole with skin protectant, naloxone **OR** naloxone **OR** naloxone **OR** naloxone, ondansetron **OR** ondansetron, prochlorperazine **OR** prochlorperazine **OR** prochlorperazine, QUEtiapine, sodium chloride (PF)

## 2022-10-03 ENCOUNTER — APPOINTMENT (OUTPATIENT)
Dept: PHYSICAL THERAPY | Facility: CLINIC | Age: 77
DRG: 023 | End: 2022-10-03
Attending: INTERNAL MEDICINE
Payer: COMMERCIAL

## 2022-10-03 LAB
10OH-CARBAZEPINE SERPL-MCNC: 11 UG/ML
ANION GAP SERPL CALCULATED.3IONS-SCNC: 10 MMOL/L (ref 3–14)
BUN SERPL-MCNC: 18 MG/DL (ref 7–30)
CALCIUM SERPL-MCNC: 8.8 MG/DL (ref 8.5–10.1)
CHLORIDE BLD-SCNC: 100 MMOL/L (ref 94–109)
CO2 SERPL-SCNC: 25 MMOL/L (ref 20–32)
CREAT SERPL-MCNC: 0.48 MG/DL (ref 0.52–1.04)
GFR SERPL CREATININE-BSD FRML MDRD: >90 ML/MIN/1.73M2
GLUCOSE BLD-MCNC: 173 MG/DL (ref 70–99)
MAGNESIUM SERPL-MCNC: 2 MG/DL (ref 1.6–2.3)
PHOSPHATE SERPL-MCNC: 3.5 MG/DL (ref 2.5–4.5)
POTASSIUM BLD-SCNC: 3.9 MMOL/L (ref 3.4–5.3)
SODIUM SERPL-SCNC: 135 MMOL/L (ref 133–144)

## 2022-10-03 PROCEDURE — 82310 ASSAY OF CALCIUM: CPT | Performed by: HOSPITALIST

## 2022-10-03 PROCEDURE — 250N000013 HC RX MED GY IP 250 OP 250 PS 637: Performed by: HOSPITALIST

## 2022-10-03 PROCEDURE — 250N000013 HC RX MED GY IP 250 OP 250 PS 637: Performed by: INTERNAL MEDICINE

## 2022-10-03 PROCEDURE — 120N000001 HC R&B MED SURG/OB

## 2022-10-03 PROCEDURE — 99232 SBSQ HOSP IP/OBS MODERATE 35: CPT | Performed by: INTERNAL MEDICINE

## 2022-10-03 PROCEDURE — 83735 ASSAY OF MAGNESIUM: CPT | Performed by: HOSPITALIST

## 2022-10-03 PROCEDURE — 36415 COLL VENOUS BLD VENIPUNCTURE: CPT | Performed by: HOSPITALIST

## 2022-10-03 PROCEDURE — 97116 GAIT TRAINING THERAPY: CPT | Mod: GP | Performed by: PHYSICAL THERAPIST

## 2022-10-03 PROCEDURE — 97530 THERAPEUTIC ACTIVITIES: CPT | Mod: GP | Performed by: PHYSICAL THERAPIST

## 2022-10-03 PROCEDURE — 84100 ASSAY OF PHOSPHORUS: CPT | Performed by: HOSPITALIST

## 2022-10-03 PROCEDURE — 250N000013 HC RX MED GY IP 250 OP 250 PS 637

## 2022-10-03 RX ORDER — LEVETIRACETAM 750 MG/1
750 TABLET ORAL 2 TIMES DAILY
Status: DISCONTINUED | OUTPATIENT
Start: 2022-10-03 | End: 2022-10-04 | Stop reason: HOSPADM

## 2022-10-03 RX ORDER — PANTOPRAZOLE SODIUM 40 MG/1
40 TABLET, DELAYED RELEASE ORAL
Status: DISCONTINUED | OUTPATIENT
Start: 2022-10-04 | End: 2022-10-04 | Stop reason: HOSPADM

## 2022-10-03 RX ADMIN — Medication 250 MG: at 21:17

## 2022-10-03 RX ADMIN — LIDOCAINE 1 PATCH: 560 PATCH PERCUTANEOUS; TOPICAL; TRANSDERMAL at 08:59

## 2022-10-03 RX ADMIN — Medication 1 PACKET: at 17:30

## 2022-10-03 RX ADMIN — QUETIAPINE FUMARATE 25 MG: 25 TABLET ORAL at 08:57

## 2022-10-03 RX ADMIN — Medication 250 MG: at 08:57

## 2022-10-03 RX ADMIN — MICONAZOLE NITRATE: 20 CREAM TOPICAL at 09:22

## 2022-10-03 RX ADMIN — LEVETIRACETAM 750 MG: 750 TABLET, FILM COATED ORAL at 21:17

## 2022-10-03 RX ADMIN — ACETAMINOPHEN 650 MG: 325 TABLET, FILM COATED ORAL at 09:13

## 2022-10-03 RX ADMIN — NORTRIPTYLINE HYDROCHLORIDE 25 MG: 25 CAPSULE ORAL at 21:17

## 2022-10-03 RX ADMIN — LEVETIRACETAM 750 MG: 100 SOLUTION ORAL at 09:14

## 2022-10-03 RX ADMIN — Medication 40 MG: at 09:14

## 2022-10-03 RX ADMIN — MICONAZOLE NITRATE: 20 CREAM TOPICAL at 05:20

## 2022-10-03 RX ADMIN — Medication 1 PACKET: at 12:48

## 2022-10-03 RX ADMIN — ACETAMINOPHEN AND CODEINE PHOSPHATE 1 TABLET: 300; 30 TABLET ORAL at 13:10

## 2022-10-03 RX ADMIN — MICONAZOLE NITRATE: 20 CREAM TOPICAL at 21:18

## 2022-10-03 RX ADMIN — MIRTAZAPINE 15 MG: 15 TABLET, FILM COATED ORAL at 21:17

## 2022-10-03 RX ADMIN — Medication 1 PACKET: at 10:02

## 2022-10-03 RX ADMIN — TOPIRAMATE 50 MG: 50 TABLET ORAL at 21:17

## 2022-10-03 RX ADMIN — OXCARBAZEPINE 450 MG: 150 TABLET, FILM COATED ORAL at 21:17

## 2022-10-03 RX ADMIN — QUETIAPINE FUMARATE 50 MG: 50 TABLET ORAL at 21:17

## 2022-10-03 RX ADMIN — QUETIAPINE FUMARATE 25 MG: 25 TABLET ORAL at 17:29

## 2022-10-03 RX ADMIN — ACETAMINOPHEN AND CODEINE PHOSPHATE 1 TABLET: 300; 30 TABLET ORAL at 19:42

## 2022-10-03 RX ADMIN — LEVETIRACETAM 750 MG: 750 TABLET, FILM COATED ORAL at 12:56

## 2022-10-03 RX ADMIN — Medication 15 ML: at 09:14

## 2022-10-03 RX ADMIN — NORTRIPTYLINE HYDROCHLORIDE 25 MG: 25 CAPSULE ORAL at 08:57

## 2022-10-03 RX ADMIN — ACETAMINOPHEN AND CODEINE PHOSPHATE 1 TABLET: 300; 30 TABLET ORAL at 05:20

## 2022-10-03 ASSESSMENT — ACTIVITIES OF DAILY LIVING (ADL)
ADLS_ACUITY_SCORE: 33
ADLS_ACUITY_SCORE: 30
ADLS_ACUITY_SCORE: 33
ADLS_ACUITY_SCORE: 30
ADLS_ACUITY_SCORE: 34
ADLS_ACUITY_SCORE: 33
ADLS_ACUITY_SCORE: 33
ADLS_ACUITY_SCORE: 37
ADLS_ACUITY_SCORE: 30
ADLS_ACUITY_SCORE: 37
ADLS_ACUITY_SCORE: 30
ADLS_ACUITY_SCORE: 34

## 2022-10-03 NOTE — PROGRESS NOTES
[facility]    Internal Medicine Hospitalist Progress Note  10/03/2022  I evaluated patient on the above date.    Pablito Medeiros Jr., MD  [phone] (p)  Text Page  Vocera        Assessment & Plan New actions/orders today (10/03/2022) are underlined.    Julisa Chaney is a 77 year old female with history including trigeminal neuralgia, who was initially admitted to [facility] on 7/19/2022 for severe sepsis due to COVID-19 infection and bacterial pneumonia. Treated with 5 days of remdesivir (did not require any steroids) as well as  IV meropenem and IV vancomycin. Hospital course complicated by an acute and prolonged metabolic encephalopathy. Ultimately, head CT 7/25/2022 showed a possible left frontal mass causing vasogenic edema. 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 Antibiotics switched to IV vancomycin, cefepime and metronidazole. Transferred to [facility] 7/27/2022 for neurosurgical assessment.    Underwent left frontal ventriculostomy on 7/31/2022. Developed C. difficile while on antibiotics requiring rectal tube and a prolonged vanco course. Significant dysphagia and s/p g-tube 8/16. Mental status slowly improved. Hospital course prolonged with discharge disposition.       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  * Initial admission at [facility] 7/19 and transfer to [facility] 7/27 as above.  * Continued on broad antibiotics on admit.  * Neurosurgery, Neurology, ID and Oncology consulted.  * EEG 7/26 showed left frontotemporal region slowing with intermittent sharp wave activity; 48 hrs EEG recording showed severe diffuse nonspecific encephalopathy with irritability over the posterior head regions.  * MRI brain 7/30 showed ventriculitis with probable abscess.   * Underwent above surgery on 7/31; cultures no growth.  * Brain, CSF flow cytometry 8/1 did not  show any evidence of malignancy.  * EVD removed 8/8.  * Antibiotics narrowed to meropenem and vancomycin 8/13.  * Head CT 8/19 showed stable presumed vasogenic edema in the white matter of the anterior inferior left frontal lobe related to ventriculitis of the left lateral ventricle; stable ventriculomegaly of the left lateral ventricle.  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  * Given slow improvement, Hospitalist team had briefly discussed palliative care with patient's daughter on 9/6/22; they wanted restorative care at this time.  * Completed 6 weeks IV antibiotics on 9/8.  * On 10/1, PRN tramadol change to acetaminophen-codeine (pt taking this PTA).  - Monitor clinically.  - Continue PRN acetaminophen, PRN acetaminophen-codeine 300-30 mg q6h; minimize opioids as able.    Acute infectious and metabolic encephalopathy due to above, resolved.  Concern for cognitive impairment with slums 18/30.  * Initial presentation as above.  * Treatment of brain abscess as above.  * Mentation subsequently much improved overall; however, fluctuating at times and will not engage in conversation most of the times.  * OT - Slums assessment 18/30 on 9/26.  - Continue to other treat issues as noted.  - Re-orient as needed.  - Maintain normal day/night, sleep/wake cycles.  - Minimize sedating medications as able.  - Neurocognitive assessment as outpatient.    Severe dysphagia, improved.  Severe malnutrition related to acute on chronic medical disease, s/p g-tube placement 8/16.  Pain related to g-tube.  * Pt with significant dysphagia and severe malnutrition (see Nutrition notes) that required a feeding tube.  * G-tube placed 8/16.  * Diet able to be advanced.  * Pt with persistent complaints  of discomfort relating to g-tube.  * AXR 9/28 negative for acute findings.  * On 10/1, pt continued to complain of g-tube site pain.  * On 10/2, d/w pt regarding possible removal; however, she said she may not be able to take medications orally due to the taste.  - I d/w IR and g-tubes should remain in place minimum 6 weeks, which has been exceeded in pt's case; as such, will ask IR to see regarding g-tube removal.  - Change liquid meds to pills as pt does not like the taste of the liquid meds.  - Continue dysphagia 7 diet with thin liquids.  - Continue TF's until g-tube removed; per Nutrition note 10/3, pt getting ~40% of her needs orally; but pt says that abdominal pain from g-tube is limiting her oral intake amounts.     C. difficile colitis, treated.  * Patient was noted to have copious diarrhea on 8/1; testing positive for C. Difficile. Started on PO vanco 8/2.  * Required rectal tube.  * Rectal tube was discontinued 8/28.   * Extended course of vancomycin given other antibiotics for brain abscess.  * Completed treatment with oral vancomycin 9/23.  * Cholestyramine (started earlier this hospitalization) stopped 10/2.  - Continue saccharomyces boulardii probiotic.    Acute on chronic anemia, multifactorial due to anemia of chronic disease + iron deficiency.  * Hgb on admission was 11.7.  * Hgb slowly trended down. Iron studies 7/30 suggested anemia of chronic disease + iron deficiency; vitamin B12 and folate not decreased.  * Received 1 unit PRBC on 8/8/22 for Hb 6.9.  * Received IV iron infusion x3 with first on 8/11-8/12.  * Hemoglobin subsequently stable around 10.  - Monitor hgb periodically as needed.     Depression/anxiety with suicidal thoughts and agitation.  * PTA on mirtazapine.  * With improvement in her mentation, she started becoming restless and agitated at times.  * PRN quetiapine started during this hospitalization.  * On 8/27, reported having suicidal thoughts; no plans/attempts.  Initiated suicide precautions and sitter; evaluated by Psychiatry service; sitter subsequently discontinued.  * Scheduled quetiapine started 8/29 and subsequently titrated.  * On 9/30, again expressed passive suicidal ideations. Psychiatry re-consulted.  * Seen by Psychiatry again 10/1; PRN gabapentin added for anxiety; pt told Dr. Turner she was previously on acetaminophen-codeine for pain (trigeminal neuralgia) and was off of it this hospitalization, which was affecting her overall mood. PRN acetaminophen-codeine restarted 10/1 (see below).  * Mood improved 10/2.  - Continue mirtazapine 15 mg at bedtime.  - Continue PRN gabapentin 100 mg TID; quetiapine 25 mg BID and 50 mg at bedtime; PRN quetiapine 12.5 mg BID.    Back pain, suspect exacerbated by immobility.   - Continue to increase activity as able.  - Continue lidocaine patches, PRN acetaminophen, PRN acetaminophen-codeine 300-30 mg q6h; minimize opioids as able.    Trigeminal neuralgia.  * PTA on levetiracetam, nortriptyline, oxcarbazepine, topiramate; PRN acetaminophen-codeine.  * Levetiracetam increased at [facility] PTA in the setting of brain abscess.  * Neurology consulted 9/29 regarding possibly decreasing levetiracetam dose.  * Discussed with Neurology 9/30 and levetiracetam decreased.  * On 10/1, PRN tramadol change to acetaminophen-codeine (pt taking this PTA).  - Continue levetiracetam 750 mg BID (decreased 9/30), nortriptyline, oxcarbazepine, topiramate.  - Continue PRN acetaminophen-codeine.    GERD.  History of duodenal stricture.  History of pyloric ulcer.  * EGD 2020 showed gastroduodenitis with a duodenal stricture.  - Continue pantoprazole.    Weakness and physical deconditioning due to multiple acute and chronic medical issues.  - Continue PT and OT.  - Plan TCU.     Hyperglycemia, suspect stress induced, resolved.  * HbA1c was 5.6 on 7/19/2022.    * Required ISS this hospitalization, subsequently stopped.    Hypokalemia, hypophosphatemia,  "resolved.  * Potassium and phosphorus low at times this hospitalization. Treated with replacement.    Hypernatremia, resolved.  Hyponatremia, resolved.  * Had hypernatremia this hospitalization and was started on free water flushes.    * Had hyponatremia and water flushed decreased.  * Sodium subsequently normalized.  - Monitor BMP periodically.        Clinically Significant Risk Factors Present on Admission                        COVID-19 testing.  COVID-19 PCR Results    COVID-19 PCR Results 2/7/22 7/19/22   SARS CoV2 PCR Negative Positive (A)   (A) Abnormal value       Comments are available for some flowsheets but are not being displayed.         COVID-19 Antibody Results, Testing for Immunity    COVID-19 Antibody Results, Testing for Immunity   No data to display.             Diet: Snacks/Supplements Adult: Other; L: mota Magic Cup, PRN sup w/ dinner; With Meals  Room Service  Combination Diet Easy to Chew (level 7); Thin Liquids (level 0) (pt prefers straws)  Calorie Counts  Adult Formula Drip Feeding: Continuous Vital 1.5; Gastrostomy; Goal Rate: 50 mL/hr x 12 hrs (8pm-8am); mL/hr; Medication - Feeding Tube Flush Frequency: At least 15-30 mL water before and after medication administration and with tube clogging; Estella...  Snacks/Supplements Adult: Gelatein Plus; With Meals    Prophylaxis: PCD's, ambulation.   Abrams Catheter: Not present  Central Lines: None  Code Status: Full Code    Disposition Plan   Expected discharge: Recommended to transitional care unit pending bed availability.  Entered: Pablito Medeiros MD 10/03/2022, 11:16 AM         Interval History    Doing better.  Wants the g-tube out due to discomfort/pain; \"I need it out\".  Tolerating diet; feels she can eat more once tube is out as she has pain from the tube, limiting her oral intake.  Tolerating pills; does not like the taste of liquid meds.    -Data reviewed today: I reviewed all new labs and imaging over the last 24 hours. I personally " reviewed no images or EKG's today.    Physical Exam    , Blood pressure 111/55, pulse 99, temperature 99.5  F (37.5  C), temperature source Oral, resp. rate 18, weight 62.5 kg (137 lb 12.6 oz), SpO2 94 %, not currently breastfeeding. O2 Device: None (Room air)    Vitals:    09/20/22 0540 09/28/22 0601 10/03/22 0913   Weight: 58 kg (127 lb 13.9 oz) 54.6 kg (120 lb 5.9 oz) 62.5 kg (137 lb 12.6 oz)     Vital Signs with Ranges  Temp:  [98.4  F (36.9  C)-99.5  F (37.5  C)] 99.5  F (37.5  C)  Pulse:  [] 99  Resp:  [16-18] 18  BP: ()/(49-59) 111/55  SpO2:  [94 %-100 %] 94 %  Patient Vitals for the past 24 hrs:   BP Temp Temp src Pulse Resp SpO2 Weight   10/03/22 0913 -- -- -- -- -- -- 62.5 kg (137 lb 12.6 oz)   10/03/22 0838 111/55 99.5  F (37.5  C) Oral 99 18 94 % --   10/02/22 2010 122/59 99  F (37.2  C) Oral 100 16 95 % --   10/02/22 1500 114/59 98.6  F (37  C) Oral 90 16 100 % --   10/02/22 1158 92/49 98.4  F (36.9  C) Oral 73 16 96 % --     I/O's Last 24 hours  I/O last 3 completed shifts:  In: 720 [P.O.:120; NG/GT:600]  Out: 125 [Urine:125]    Constitutional: Awake, alert, oriented, conversant.  Respiratory: Diminished in bases. No crackles or wheezes.  Cardiovascular: RRR, no m/r/g.  GI: Soft, tender around g-tube site, no r/g, +BS.  Skin/Integumen:   Other:        Data   Recent Labs   Lab 10/03/22  1006 10/01/22  1159 10/01/22  0757 09/30/22  0836 09/29/22  0832     --   --   --   --    POTASSIUM 3.9  --   --  4.4 3.7   CHLORIDE 100  --   --   --   --    CO2 25  --   --   --   --    BUN 18  --   --   --   --    CR 0.48*  --   --   --   --    ANIONGAP 10  --   --   --   --    ADY 8.8  --   --   --   --    * 111* 114*  --   --      Recent Labs   Lab Test 10/03/22  1006 10/01/22  1159 10/01/22  0757 09/26/22  0800 09/25/22  1248   * 111* 114* 144* 92     No results for input(s): WBC, CRP, DD, LDH, FIBR, PAOLA, IL6B, LACT, LACTS, PCAL in the last 168 hours.      No results found for this  or any previous visit (from the past 24 hour(s)).    Medications   All medications were reviewed.      banatrol plus  1 packet Per Feeding Tube TID w/meals     levETIRAcetam  750 mg Oral or Feeding Tube Q12H     lidocaine  1 patch Transdermal Q24H     lidocaine   Transdermal Q8H MARCIA     miconazole with skin protectant   Topical BID     mirtazapine  15 mg Oral or Feeding Tube At Bedtime     multivitamins w/minerals  15 mL Per Feeding Tube Daily     nortriptyline  25 mg Oral BID     OXcarbazepine  450 mg Oral or Feeding Tube At Bedtime     pantoprazole  40 mg Oral or Feeding Tube QAM AC     QUEtiapine  25 mg Oral BID     QUEtiapine  50 mg Oral At Bedtime     saccharomyces boulardii  250 mg Per Feeding Tube BID     sodium chloride (PF)  10-40 mL Intracatheter Q8H     sodium chloride (PF)  10-40 mL Intracatheter Q7 Days     topiramate  50 mg Oral or Feeding Tube At Bedtime     [DISCONTINUED] acetaminophen **OR** acetaminophen, acetaminophen, acetaminophen-codeine, artificial tears, glucose **OR** dextrose **OR** glucagon, gabapentin, guaiFENesin, ipratropium - albuterol 0.5 mg/2.5 mg/3 mL, melatonin, menthol-zinc oxide, metoprolol, miconazole with skin protectant, naloxone **OR** naloxone **OR** naloxone **OR** naloxone, ondansetron **OR** ondansetron, prochlorperazine **OR** prochlorperazine **OR** prochlorperazine, QUEtiapine, sodium chloride (PF)

## 2022-10-03 NOTE — PROGRESS NOTES
Pt A&O x4, up x1 with walker and GB to use bedside commode. Poor intake. Pain to G tube site, back, and headache, managed with Tyl and Tyl 3, ice and heat packs. G tube was removed by IR at ~ 1400. NPO for 6 hours. Continues contact iso.

## 2022-10-03 NOTE — PLAN OF CARE
Goal Outcome Evaluation:    Patient here with intracerebral abscess. A&0x4. Up with 1  GB and walker. Using Bedside commode. TF runs from 2000 - 0800 at 50ml.  100 ml FWF q 6 hrs. G -tube in place is CDI. Patient continues to c/o pain at G-tube site. MD aware.  Neuro's unchanged.   C/O ongoing PEG site pain. MD aware. Using ice and heat packs as well as PRN Tylenol with Codeine. ISO for MRSA.  Full code. Awaiting placement.

## 2022-10-03 NOTE — CONSULTS
IR consult request to remove the patient's G tube which was placed on 8/16/22. The patient is eating better and the g tube is causing abdominal pain.     After explaining the process and answering questions the g tube balloon was deflated and the g tube removed. Gauze and tape placed over the site which should be changed every 24 hours until healed.     Granulation tissue was noted around the g tube which can be very sensitive and cause pain. This will healed after the tube has been removed, scab and fall off.     NPO now for 6 hours to assist with the healing.     Thanks, Rylie [facility] Interventional Radiology CNP ([phone]) (phone [phone])

## 2022-10-03 NOTE — PROGRESS NOTES
CALORIE COUNT      Approximate Oral Intake for: (10/2 - 3 meals recorded)      Calories: 788 cals (46% est needs)  Protein: 32 gm  (46% est needs)    EN via G-tube:  Vital 1.5 @ 50 mL/hr x12 hours (8 pm - 8 am) + 1 pkt Banatrol TID  = 1020 kcal, 41 g protein        Dosing Weight: 48.3 kg (7/27 - admit wt)    Estimated Needs:    Estimated Energy Needs: 5377-1671 kcals (35-40 Kcal/Kg)  Justification: repletion and underweight  Estimated Protein Needs: 70-95 grams protein (1.5-2 g pro/Kg)  Justification: repletion         Summary:   Will continue calorie count for 1 more day    Nadeen Candelario RD, LD  Clinical Dietitian - [facility]   Pager - [phone]

## 2022-10-03 NOTE — PROGRESS NOTES
Care Management Follow Up    Length of Stay (days): 68    Expected Discharge Date: 10/07/2022     Concerns to be Addressed:       Patient plan of care discussed at interdisciplinary rounds: Yes    Anticipated Discharge Disposition: LTACH     Anticipated Discharge Services:    Anticipated Discharge DME:      Patient/family educated on Medicare website which has current facility and service quality ratings:    Education Provided on the Discharge Plan:    Patient/Family in Agreement with the Plan: yes    Referrals Placed by CM/SW: Post Acute Facilities (Submit for insurance authorization)  Private pay costs discussed: Not applicable    Additional Information:   placed call to Karis - [phone] who completes the level 2 screening. Karis stated she received the info to follow patient and requested for writer to fax H&P and psychiatry consult notes to [phone] -  faxed. Karis stated she will likely be able to start this today and is aware that this needs to be completed in order for patient to discharge to TCU. Patient is medically ready for TCU discharge.   left voicemail for [facility] admissions who clinically accepted patient last week - awaiting return call.     Addendum 1143-  received call from Randall in admissions at [facility] who anticipates they can accept patient into their TCU pending level 2 screening results.   spoke with [facility] who is currently working on assessment and will fax results to  and [facility]. Karis inquired on patient's outside mental health support. Dahliar spoke with patient's daughter who reports she does not receive any outside mental health services and receives her antidepressant medication through her primary care provider. Dahliar educated Alissa (daughter) on level 2 screen and next steps and will keep her updated on process.     Will continue to follow.    ARLEN Bellamy, LGSW    [facility]

## 2022-10-04 ENCOUNTER — PATIENT OUTREACH (OUTPATIENT)
Dept: CARE COORDINATION | Facility: CLINIC | Age: 77
End: 2022-10-04

## 2022-10-04 ENCOUNTER — LAB REQUISITION (OUTPATIENT)
Dept: LAB | Facility: CLINIC | Age: 77
End: 2022-10-04

## 2022-10-04 ENCOUNTER — APPOINTMENT (OUTPATIENT)
Dept: OCCUPATIONAL THERAPY | Facility: CLINIC | Age: 77
DRG: 023 | End: 2022-10-04
Attending: INTERNAL MEDICINE
Payer: COMMERCIAL

## 2022-10-04 ENCOUNTER — MEDICAL CORRESPONDENCE (OUTPATIENT)
Dept: HEALTH INFORMATION MANAGEMENT | Facility: CLINIC | Age: 77
End: 2022-10-04

## 2022-10-04 VITALS
HEART RATE: 97 BPM | SYSTOLIC BLOOD PRESSURE: 112 MMHG | OXYGEN SATURATION: 96 % | RESPIRATION RATE: 16 BRPM | BODY MASS INDEX: 22.93 KG/M2 | DIASTOLIC BLOOD PRESSURE: 53 MMHG | TEMPERATURE: 97.7 F | WEIGHT: 137.79 LBS

## 2022-10-04 DIAGNOSIS — Z00.01 ENCOUNTER FOR GENERAL ADULT MEDICAL EXAMINATION WITH ABNORMAL FINDINGS: ICD-10-CM

## 2022-10-04 PROCEDURE — 250N000013 HC RX MED GY IP 250 OP 250 PS 637: Performed by: INTERNAL MEDICINE

## 2022-10-04 PROCEDURE — 250N000013 HC RX MED GY IP 250 OP 250 PS 637: Performed by: HOSPITALIST

## 2022-10-04 PROCEDURE — 250N000013 HC RX MED GY IP 250 OP 250 PS 637: Performed by: PSYCHIATRY & NEUROLOGY

## 2022-10-04 PROCEDURE — 97530 THERAPEUTIC ACTIVITIES: CPT | Mod: GO

## 2022-10-04 PROCEDURE — 97535 SELF CARE MNGMENT TRAINING: CPT | Mod: GO

## 2022-10-04 PROCEDURE — 99239 HOSP IP/OBS DSCHRG MGMT >30: CPT | Performed by: HOSPITALIST

## 2022-10-04 RX ORDER — ACETAMINOPHEN 325 MG/1
650 TABLET ORAL EVERY 6 HOURS PRN
DISCHARGE
Start: 2022-10-04 | End: 2022-12-01

## 2022-10-04 RX ORDER — GABAPENTIN 100 MG/1
100 CAPSULE ORAL 3 TIMES DAILY PRN
DISCHARGE
Start: 2022-10-04 | End: 2022-11-08

## 2022-10-04 RX ORDER — QUETIAPINE FUMARATE 25 MG/1
25 TABLET, FILM COATED ORAL 2 TIMES DAILY
DISCHARGE
Start: 2022-10-04 | End: 2022-11-08

## 2022-10-04 RX ORDER — ACETAMINOPHEN AND CODEINE PHOSPHATE 300; 30 MG/1; MG/1
1 TABLET ORAL EVERY 6 HOURS PRN
Qty: 10 TABLET | Refills: 0 | Status: SHIPPED | OUTPATIENT
Start: 2022-10-04 | End: 2022-10-06

## 2022-10-04 RX ORDER — LIDOCAINE 4 G/G
1 PATCH TOPICAL EVERY 24 HOURS
DISCHARGE
Start: 2022-10-04 | End: 2023-07-02

## 2022-10-04 RX ORDER — QUETIAPINE FUMARATE 50 MG/1
50 TABLET, FILM COATED ORAL AT BEDTIME
DISCHARGE
Start: 2022-10-04 | End: 2022-11-08

## 2022-10-04 RX ORDER — NORTRIPTYLINE HCL 25 MG
25 CAPSULE ORAL 2 TIMES DAILY
DISCHARGE
Start: 2022-10-04 | End: 2022-12-16

## 2022-10-04 RX ORDER — LEVETIRACETAM 750 MG/1
750 TABLET ORAL 2 TIMES DAILY
DISCHARGE
Start: 2022-10-04 | End: 2022-11-08

## 2022-10-04 RX ORDER — QUETIAPINE FUMARATE 25 MG/1
12.5 TABLET, FILM COATED ORAL 2 TIMES DAILY PRN
DISCHARGE
Start: 2022-10-04 | End: 2022-12-01

## 2022-10-04 RX ADMIN — QUETIAPINE FUMARATE 25 MG: 25 TABLET ORAL at 08:22

## 2022-10-04 RX ADMIN — ACETAMINOPHEN 650 MG: 325 TABLET, FILM COATED ORAL at 03:45

## 2022-10-04 RX ADMIN — PANTOPRAZOLE SODIUM 40 MG: 40 TABLET, DELAYED RELEASE ORAL at 07:05

## 2022-10-04 RX ADMIN — Medication 15 ML: at 10:17

## 2022-10-04 RX ADMIN — Medication 1 PACKET: at 08:22

## 2022-10-04 RX ADMIN — MICONAZOLE NITRATE: 20 CREAM TOPICAL at 08:25

## 2022-10-04 RX ADMIN — NORTRIPTYLINE HYDROCHLORIDE 25 MG: 25 CAPSULE ORAL at 08:23

## 2022-10-04 RX ADMIN — LEVETIRACETAM 750 MG: 750 TABLET, FILM COATED ORAL at 08:23

## 2022-10-04 RX ADMIN — GABAPENTIN 100 MG: 100 CAPSULE ORAL at 05:44

## 2022-10-04 RX ADMIN — Medication 1 PACKET: at 11:57

## 2022-10-04 RX ADMIN — Medication 250 MG: at 08:22

## 2022-10-04 RX ADMIN — LIDOCAINE 1 PATCH: 560 PATCH PERCUTANEOUS; TOPICAL; TRANSDERMAL at 08:22

## 2022-10-04 ASSESSMENT — ACTIVITIES OF DAILY LIVING (ADL)
ADLS_ACUITY_SCORE: 37
ADLS_ACUITY_SCORE: 38
ADLS_ACUITY_SCORE: 38
ADLS_ACUITY_SCORE: 37
ADLS_ACUITY_SCORE: 37

## 2022-10-04 NOTE — PROGRESS NOTES
Clinic Care Coordination Contact  Care Team Conversations    Situation: RN Clinical Product Navigator following patient through TCU care progression.      Background: Patient was inpatient at [facility] 7/27/22-10/4/22 due to left lateral ventricular abscess with severe sepsis s/p left frontal ventriculostomy (7/31/22), acute infectious and metabolic encephalopathy (resolved), concern for cognitive impairment with SLUMS 18/30, trigeminal neuralgia, severe dysphagia, severe malnutrition related to acute on chronic medical disease-s/p g-tube placement (8/16/22), C. Diff, acute on chronic anemia, depression/anxiety with suicidal thoughts and agitation, acute on chronic back pain, physical deconditioning, hyperglycemia-suspect stress induced (resolved), hypokalemia, hypophosphatemia (resolved).  Patient was previously at [facility] 7/19/22-7/27/22 for severe sepsis due to COVID-19 and suspected bacterial pneumonia.  Patient deteriorated despite seizure medications and antibiotics and MRI suggested brain abscess; neurosurgery recommended transfer to a higher level of care at [facility].      [facility]    Assessment: Prior to hospitalization patient was living with zee Shankar.  Patient has a grab bar for shower.    During hospitalization patient had a palliative care consult; family wanted restorative care at this time.  Feeding tube was removed 10/3/22.    Patient's levetiracetam was decreased on 9/30/22 for patient's trigeminal neuralgia.  Patient received IV iron infusions X3 and 1 unite PRBC for Hgb 6.9.  Hgb at discharge was around 10.    Patient was seen by psychiatry while inpatient; noted patient was off of acetaminophen-codeine for pain (trigeminal neuralgia) and had been off of it during the hospitalization.  This was restarted and patient's mood improved.  [facility] assessment level 2 completed.        Discharge recommendations:   The  following labs/tests are recommended:  Follow CBC, CMP, magnesium, phosphorus levels in 5 to 7 days or earlier if symptomatic.  Neurocognitive assessment  prior to discharge from TCU.  Follow-up with neurology in clinic in 4 to 6 weeks or earlier if symptomatic.  Follow-up with psychiatry as outpatient optimize psych meds.,   Discuss long-term goals of care on PCP visit.  Follow-up with speech therapy, advance diet as tolerated.  Age-appropriate health maintenance on PCP visit.  Monitor potassium, phosphorus and sodium in 1 weeks due to hyperglycemia, hypokalemia, hypophosphatemia, hyponatremia followed by hyperglycemia.  Outpatient Neurocognitive assessment  Follow-up with Neurology after discharge in 4-6 weeks or early if symptomatic.  Continue iron supplements and monitor hemoglobin periodically.  Follow-up with psychiatry after discharge    Appointments in Next Year    Oct 26, 2022  2:20 PM  Return Visit with Brady Heredia MD  [facility] ([facility] ) [phone]               Plan/Recommendations: RN Clinical Product Navigator  will send TCU Care Team Care Management hand in communication and request involvement in discharge planning and coordination of care.      Melissa Behl BSN, RN, PHN, CCM  RN Clinical Product Navigator  [phone]

## 2022-10-04 NOTE — PROGRESS NOTES
Care Management Discharge Note    Discharge Date: 10/04/2022       Discharge Disposition: Transitional Care    Discharge Services: Transportation Services    Discharge DME:      Discharge Transportation: family or friend will provide    Private pay costs discussed: Not applicable    PAS Confirmation Code: [id number]  Patient/family educated on Medicare website which has current facility and service quality ratings: yes    Education Provided on the Discharge Plan:    Persons Notified of Discharge Plans: patient, daughter, and care team  Patient/Family in Agreement with the Plan: yes    Handoff Referral Completed:   Additional Information:  Received discharge orders. Patient is discharging to [facility]. Patient is in agreement with discharge. [facility] assessment level 2 was faxed yesterday to [facility]. PAS already completed. Patient's daughter notified of discharge. Writer faxed discharge orders. Writer received request from Lynnette at [facility] for a 3 day supply of tube feeding nutrition - writer educated that the PEG was removed and she no longer needs any tube feedings.     ARLEN Bellamy, LGSW    [facility]

## 2022-10-04 NOTE — PROGRESS NOTES
Pt here with brain abscess. A&O x4, forgetful. Neuros with generalized weakness. VSS. Easy to chew diet, thin liquids. Takes pills whole. Up with Ax1 GBW. C/o headache and back pain, tylenol given PRN. Pt scoring green on the Aggression Stop Light Tool. Discharge to TCU pending placement.

## 2022-10-04 NOTE — PROGRESS NOTES
Care Management Follow Up    Length of Stay (days): 69    Expected Discharge Date: 10/04/2022     Concerns to be Addressed:       Patient plan of care discussed at interdisciplinary rounds: Yes    Anticipated Discharge Disposition: LTACH     Anticipated Discharge Services:    Anticipated Discharge DME:      Patient/family educated on Medicare website which has current facility and service quality ratings:    Education Provided on the Discharge Plan:    Patient/Family in Agreement with the Plan: yes    Referrals Placed by CM/SW: Post Acute Facilities (Submit for insurance authorization)  Private pay costs discussed: Not applicable    Additional Information:  Writer met with patient to discuss discharge plan. Writer educated patient she was clinically accepted at [facility] today at noon. Patient inquired if there was a [place]CU [place] that [place] for [place] - writer educated other facilities did not have bed availability/could not meed needs at this time. Patient understood and is in agreement with discharging to [facility] at 1400 via [facility] ([facility]).   PAS already completed and level 2 screening completed.     [employer] authorization was approved - approval [id number].      ARLEN Bellamy, LGSW    [facility]

## 2022-10-04 NOTE — PLAN OF CARE
Goal Outcome Evaluation:      VSS on RA. A/Ox4. No IV access. Packet printed and sent w/ transport. Pt discharged to [facility] w/ transport via wheelchair.

## 2022-10-04 NOTE — PLAN OF CARE
Pt here with brain abscess. A&O x4, forgetful. Neuros with generalized weakness, BLE numbness in toes. VSS. Easy chew diet, thin liquids. Takes pills whole. Up with 1 GB/W. Incontinent of urine at times. G tube removed, dressing CDI. Getting prn tylenol 3 for back pain. Pt scoring green on the Aggression Stop Light Tool. Plan to discharge to TCU pending placement.

## 2022-10-04 NOTE — PLAN OF CARE
Occupational Therapy Discharge Summary    Reason for therapy discharge:    Discharged to transitional care facility.    Progress towards therapy goal(s). See goals on Care Plan in [facility] electronic health record for goal details.  Goals not met.  Barriers to achieving goals:   limited tolerance for therapy and discharge from facility.    Therapy recommendation(s):    Continued therapy is recommended.  Rationale/Recommendations:  continued IP OT .   OT: Patient limited by decreased activity tolerance, strength and cognition. Pt would benefit from continued skilled OT intervention in a TCU setting to maximize safety/independence w/ ADL's.

## 2022-10-04 NOTE — DISCHARGE SUMMARY
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  Acute infectious and metabolic encephalopathy due to above, resolved.  Concern for cognitive impairment with slums 18/30.  Severe dysphagia, improved.  Severe malnutrition related to acute on chronic medical disease, s/p g-tube placement 8/16.  C. difficile colitis, treated.  Acute on chronic anemia, multifactorial due to anemia of chronic disease + iron deficiency.  Depression/anxiety with suicidal thoughts and agitation.  Acute on Chronic Back pain, suspect exacerbated by immobility.   Physical deconditioning due to multiple acute and chronic medical issues.  Hyperglycemia, suspect stress induced, resolved.  Hypokalemia, hypophosphatemia, resolved.  Hypernatremia, resolved.  Hyponatremia, resolved.    Past Medical History:   Diagnosis Date     Aspiration pneumonia (H) 02/2022     Depression      High cholesterol      Migraines      Pyloric ulcer, chronic      Sepsis (H) 08/10/2020     Trigeminal neuralgia        History of Present Illness   Julisa Chaney is an 77 year old female who presented with     Hospital Course   Julisa Chaney is a 77 year old female with history including trigeminal neuralgia, who was initially admitted to [facility] on 7/19/2022 for severe sepsis due to COVID-19 infection and bacterial pneumonia. Treated with 5 days of remdesivir (did not require any steroids) as well as  IV meropenem and IV vancomycin. Hospital course complicated by an acute and prolonged metabolic encephalopathy. Ultimately, head CT 7/25/2022 showed a possible left frontal mass causing vasogenic edema. MRI 7/27/2022  showed possible left intracerebral abscess with entriculitis versus neoplasm. Antibiotics switched to IV vancomycin, cefepime and metronidazole. Transferred to [facility] 7/27/2022 for neurosurgical assessment.     Underwent left frontal ventriculostomy on 7/31/2022. Developed C. difficile while on antibiotics requiring rectal tube and a prolonged vanco course. Significant dysphagia and s/p g-tube 8/16. Mental status slowly improved.  Dysphagia improving, tolerating diet.  Feeding tube removed 10/3.  Hospital course prolonged with discharge disposition.     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  * Initial admission at [facility] 7/19 and transfer to [facility] 7/27 as above.  * Continued on broad antibiotics on admit.  * Neurosurgery, Neurology, ID and Oncology consulted.  * EEG 7/26 showed left frontotemporal region slowing with intermittent sharp wave activity; 48 hrs EEG recording showed severe diffuse nonspecific encephalopathy with irritability over the posterior head regions.  * MRI brain 7/30 showed ventriculitis with probable abscess.   * Underwent above surgery on 7/31; cultures no growth.  * Brain, CSF flow cytometry 8/1 did not show any evidence of malignancy.  * EVD removed 8/8.  * Antibiotics narrowed to meropenem and vancomycin 8/13.  * Head CT 8/19 showed stable presumed vasogenic edema in the white matter of the anterior inferior left frontal lobe related to ventriculitis of the left lateral ventricle; stable ventriculomegaly of the left lateral ventricle.  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  * Given slow improvement, Hospitalist team had briefly discussed palliative care with patient's  daughter on 9/6/22; they wanted restorative care at this time.  * Completed 6 weeks IV antibiotics on 9/8.  * On 10/1, PRN tramadol change to acetaminophen-codeine (pt taking this PTA).  - Continue PRN acetaminophen, PRN acetaminophen-codeine 300-30 mg q6h; minimize opioids as able.  Discussed long-term goals of care on PCP visit.     Acute infectious and metabolic encephalopathy due to above, resolved.  Concern for cognitive impairment with slums 18/30.  * Initial presentation as above.  * Treatment of brain abscess as above.  * Mentation subsequently much improved overall; however, fluctuating at times and will not engage in conversation most of the times.  * OT - Slums assessment 18/30 on 9/26.  - Continue to other treat issues as noted.  - Re-orient as needed.  - Maintain normal day/night, sleep/wake cycles.  - Minimize sedating medications as able.  - Neurocognitive assessment as outpatient.    Trigeminal neuralgia.  * PTA on levetiracetam, nortriptyline, oxcarbazepine, topiramate; PRN acetaminophen-codeine.  * Levetiracetam increased at [facility] PTA in the setting of brain abscess.  * Neurology (9/30) followed and levetiracetam decreased.  * On 10/1, PRN tramadol change to acetaminophen-codeine (pt taking this PTA).  - Continue levetiracetam 750 mg BID (decreased 9/30), nortriptyline, oxcarbazepine, topiramate.  - Continue PRN acetaminophen-codeine.  Follow-up with neurology after discharge in 4 to 6 weeks or earlier if symptomatic.     Severe dysphagia, improved.  Severe malnutrition related to acute on chronic medical disease, s/p g-tube placement 8/16.  * Pt with significant dysphagia and severe malnutrition (see Nutrition notes) that required a feeding tube.  * G-tube placed 8/16.  * Diet able to be advanced.  * Pt with persistent complaints of discomfort relating to g-tube. AXR 9/28 negative for acute findings.  Continue to complain of G-tube pain, followed by IR and G-tube removed on 10/3.   - Continue  dysphagia 7 diet with thin liquids.  Continue follow-up with speech therapy at TCU.    C. difficile colitis, treated.  * Patient was noted to have copious diarrhea on 8/1; testing positive for C. Difficile. Started on PO vanco 8/2  * Rectal tube was discontinued 8/28.   * Extended course of vancomycin given other antibiotics for brain abscess completed on 9/23..  * Cholestyramine (started earlier this hospitalization) stopped 10/2.     Acute on chronic anemia, multifactorial due to anemia of chronic disease + iron deficiency.  * Hgb on admission was 11.7.  * Hgb slowly trended down. Iron studies 7/30 suggested anemia of chronic disease + iron deficiency; vitamin B12 and folate not decreased.  * Received 1 unit PRBC on 8/8/22 for Hb 6.9.  * Received IV iron infusion x3 with first on 8/11-8/12.  * Hemoglobin subsequently stable around 10.  - Monitor hgb periodically, continue prior to admission iron supplements.     Depression/anxiety with suicidal thoughts and agitation.  * PTA on mirtazapine.  * With improvement in her mentation, she started becoming restless and agitated at times.  * PRN quetiapine started during this hospitalization.  * On 8/27, reported having suicidal thoughts; no plans/attempts. Initiated suicide precautions and sitter; evaluated by Psychiatry service; sitter subsequently discontinued.  * Scheduled quetiapine started 8/29 and subsequently titrated.  * On 9/30, again expressed passive suicidal ideations. Psychiatry re-consulted.  * Seen by Psychiatry again 10/1; PRN gabapentin added for anxiety; pt told Dr. Turner she was previously on acetaminophen-codeine for pain (trigeminal neuralgia) and was off of it this hospitalization, which was affecting her overall mood. PRN acetaminophen-codeine restarted 10/1 (see below).  * Mood improved 10/2.  - Continue mirtazapine 15 mg at bedtime.  - Continue PRN gabapentin 100 mg TID; quetiapine 25 mg BID and 50 mg at bedtime; PRN quetiapine 12.5 mg  BID.  -Supportive care.  Follow-up with psychiatry after discharge     Acute on Chronic Back pain, suspect exacerbated by immobility.   - Continue to increase activity as able.  - Continue lidocaine patches, PRN acetaminophen, PRN acetaminophen-codeine 300-30 mg q6h; minimize opioids as able.     GERD.  History of duodenal stricture.  History of pyloric ulcer.  * EGD 2020 showed gastroduodenitis with a duodenal stricture.  - Continue pantoprazole.     Physical deconditioning due to multiple acute and chronic medical issues.  - Continue PT and OT at TCU.     Hyperglycemia, suspect stress induced, resolved.  * HbA1c was 5.6 on 7/19/2022.    * Required ISS this hospitalization, subsequently stopped.     Hypokalemia, hypophosphatemia, resolved.  * Potassium and phosphorus low at times this hospitalization. Treated with replacement.  Monitor in 1 week.     Hypernatremia, resolved.  Hyponatremia, resolved.  * Had hypernatremia this hospitalization and was started on free water flushes.    * Had hyponatremia and water flushed decreased.  Sodium normalized.  Monitor in 1 week.      Antonio Mc MD.    Pending Results   Unresulted Labs Ordered in the Past 30 Days of this Admission     No orders found from 6/27/2022 to 7/28/2022.             Physical Exam   Vitals:    09/20/22 0540 09/28/22 0601 10/03/22 0913   Weight: 58 kg (127 lb 13.9 oz) 54.6 kg (120 lb 5.9 oz) 62.5 kg (137 lb 12.6 oz)     Vital Signs with Ranges  Temp:  [97.7  F (36.5  C)-99  F (37.2  C)] 97.7  F (36.5  C)  Pulse:  [57-97] 97  Resp:  [16-18] 16  BP: ()/(53-96) 112/53  SpO2:  [92 %-97 %] 96 %  I/O last 3 completed shifts:  In: 1030 [P.O.:480; NG/GT:550]  Out: -   PHYSICAL EXAM  GENERAL: Patient in no distress.   LUNGS:Respirations unlabored  ABDOMEN: Soft, bowel sounds heard.  Feeding tube removal site dressing intact, no bleeding or oozing noted.  NEURO: Moving all extremities.  EXTREMITIES: No pedal edema.   SKIN: Warm, dry. No  rash  PSYCHIATRY Cooperative  )Consultations This Hospital Stay   NEUROSURGERY IP CONSULT  INFECTIOUS DISEASES IP CONSULT  PHARMACY TO DOSE VANCO  NEUROLOGY CRITICAL CARE ADULT IP CONSULT  NEUROLOGY IP CONSULT  NUTRITION SERVICES ADULT IP CONSULT  INTERVENTIONAL RADIOLOGY ADULT/PEDS IP CONSULT  PHARMACY IP CONSULT  INTERVENTIONAL RADIOLOGY ADULT/PEDS IP CONSULT  HEMATOLOGY & ONCOLOGY IP CONSULT  WOUND OSTOMY CONTINENCE NURSE  IP CONSULT  PHYSICAL THERAPY ADULT IP CONSULT  OCCUPATIONAL THERAPY ADULT IP CONSULT  PHYSICAL THERAPY ADULT IP CONSULT  VASCULAR ACCESS ADULT IP CONSULT  INTERVENTIONAL RADIOLOGY ADULT/PEDS IP CONSULT  NUTRITION SERVICES ADULT IP CONSULT  WOUND OSTOMY CONTINENCE NURSE  IP CONSULT  CARE MANAGEMENT / SOCIAL WORK IP CONSULT  PSYCHIATRY IP CONSULT  SPIRITUAL HEALTH SERVICES IP CONSULT  PHYSICAL THERAPY ADULT IP CONSULT  OCCUPATIONAL THERAPY ADULT IP CONSULT  SPEECH LANGUAGE PATH ADULT IP CONSULT  PSYCHIATRY IP CONSULT  SPEECH LANGUAGE PATH ADULT IP CONSULT  PSYCHIATRY IP CONSULT  PSYCHIATRY IP CONSULT  PHYSICAL THERAPY ADULT IP CONSULT  OCCUPATIONAL THERAPY ADULT IP CONSULT  OCCUPATIONAL THERAPY ADULT IP CONSULT  NEUROLOGY IP CONSULT  PSYCHIATRY IP CONSULT  PSYCHIATRY IP CONSULT  PSYCHIATRY IP CONSULT  INTERVENTIONAL RADIOLOGY ADULT/PEDS IP CONSULT  PHYSICAL THERAPY ADULT IP CONSULT  OCCUPATIONAL THERAPY ADULT IP CONSULT  SPEECH LANGUAGE PATH ADULT IP CONSULT    Time Spent on this Encounter   I, Antonio Mc MD, personally saw the patient today and spent greater than 50 minutes discharging this patient.  Discussed with patient, floor RN, care coordinator, .    Discharge Disposition   Discharged to short-term care facility  Condition at discharge: Good    Discharge Orders      Medication Therapy Management Referral      General info for SNF    Length of Stay Estimate: Short Term Care: Estimated # of Days <30  Condition at Discharge: Stable  Level of care:skilled   Rehabilitation  Potential: Fair  Admission H&P remains valid and up-to-date: Yes  Recent Chemotherapy: N/A  Use Nursing Home Standing Orders: Yes     Mantoux instructions    Give two-step Mantoux (PPD) Per Facility Policy Yes     Reason for your hospital stay    You were admitted to the hospital with severe sepsis, COVID-19 infection and bacterial pneumonia.  Hospital course complicated by acute and prolonged metabolic encephalopathy.  Treated with IV antibiotics, underwent left ventriculostomy on 7/31/2022.  Had dysphagia, G-tube placed 8/16.  G-tube removed on 10/3/2022 as advancing diet.  Plan for transition to TCU.     Additional Discharge Instructions    Avoid narcotics and benzodiazepines as able to.  Reorient as needed.  Maintain normal day/night, sleep/wake cycle.  Encourage adequate oral intake.     Activity - Up with nursing assistance     Follow Up and recommended labs and tests    Follow up with long-term physician.  The following labs/tests are recommended:  Follow CBC, CMP, magnesium, phosphorus levels in 5 to 7 days or earlier if symptomatic.    Neurocognitive assessment  prior to discharge from TCU.  Follow-up with neurology in clinic in 4 to 6 weeks or earlier if symptomatic.  Follow-up with psychiatry as outpatient optimize psych meds.,   Discuss long-term goals of care on PCP visit.  Follow-up with speech therapy, advance diet as tolerated.  Age-appropriate health maintenance on PCP visit.     Physical Therapy Adult Consult    Evaluate and treat as clinically indicated.    Reason: Physical deconditioning.     Occupational Therapy Adult Consult    Evaluate and treat as clinically indicated.    Reason: Cognitive impairment.     Speech Language Path Adult Consult    Evaluate and treat as clinically indicated.    Reason: Dysphagia improving.     Contact Isolation    History of MRSA.  In hospital per protocol.     Fall precautions     Diet    Snacks/Supplements Adult: Other; L: mota Magic Cup, PRN sup w/ dinner;  With Meal      Snacks/Supplements Adult: Gelatein Plus; With Meals      Combination Diet Easy to Chew (level 7); Thin Liquids (level 0) (pt prefers straws)       Discharge Medications   Current Discharge Medication List      START taking these medications    Details   acetaminophen (TYLENOL) 325 MG tablet Take 2 tablets (650 mg) by mouth every 6 hours as needed for pain    Associated Diagnoses: Chronic pain syndrome      gabapentin (NEURONTIN) 100 MG capsule Take 1 capsule (100 mg) by mouth 3 times daily as needed for neuropathic pain or other (anxiety or pain)    Associated Diagnoses: Chronic pain syndrome      Lidocaine (LIDOCARE) 4 % Patch Place 1 patch onto the skin every 24 hours To prevent lidocaine toxicity, patient should be patch free for 12 hrs daily.  Back pain    Associated Diagnoses: Chronic pain syndrome      !! QUEtiapine (SEROQUEL) 25 MG tablet Take 1 tablet (25 mg) by mouth 2 times daily    Associated Diagnoses: Anxiety; Encephalitis      !! QUEtiapine (SEROQUEL) 25 MG tablet Take 0.5 tablets (12.5 mg) by mouth 2 times daily as needed    Associated Diagnoses: Anxiety; Encephalitis      !! QUEtiapine (SEROQUEL) 50 MG tablet Take 1 tablet (50 mg) by mouth At Bedtime    Associated Diagnoses: Anxiety; Encephalitis       !! - Potential duplicate medications found. Please discuss with provider.      CONTINUE these medications which have CHANGED    Details   acetaminophen-codeine (TYLENOL W/CODEINE #3) 300-30 MG per tablet Take 1 tablet by mouth every 6 hours as needed for severe pain  Qty: 10 tablet, Refills: 0    Associated Diagnoses: Migraine without aura and without status migrainosus, not intractable; Trigeminal neuralgia      levETIRAcetam (KEPPRA) 750 MG tablet Take 1 tablet (750 mg) by mouth 2 times daily    Associated Diagnoses: Asterixis      nortriptyline (PAMELOR) 25 MG capsule Take 1 capsule (25 mg) by mouth 2 times daily    Associated Diagnoses: Anxiety         CONTINUE these medications  which have NOT CHANGED    Details   cholecalciferol, vitamin D3, 50 mcg (2,000 unit) Tab [CHOLECALCIFEROL, VITAMIN D3, 50 MCG (2,000 UNIT) TAB] Take 1 tablet (2,000 Units total) by mouth daily. One tab daily  Qty: 90 tablet, Refills: 4    Associated Diagnoses: Vitamin D deficiency      desonide (DESOWEN) 0.05 % cream [DESONIDE (DESOWEN) 0.05 % CREAM] Apply to affected area 2 times daily  Qty: 60 g, Refills: 1    Associated Diagnoses: Atopic dermatitis, mild      ferrous sulfate 325 (65 FE) MG tablet [FERROUS SULFATE 325 (65 FE) MG TABLET] Take 1 tablet (325 mg total) by mouth daily with breakfast.  Qty: 30 tablet, Refills: 0    Associated Diagnoses: Iron deficiency anemia due to chronic blood loss      mirtazapine (REMERON) 15 MG tablet Take 1 tablet (15 mg) by mouth At Bedtime  Qty: 30 tablet, Refills: 1    Associated Diagnoses: Current mild episode of major depressive disorder without prior episode (H); Persistent insomnia      multivitamin (DAILY-DAVID) per tablet [MULTIVITAMIN (DAILY-DAVID) PER TABLET] Take 1 tablet by mouth daily.  Qty: 100 tablet, Refills: 3    Associated Diagnoses: Trigeminal nerve disorder      omeprazole (PRILOSEC) 40 MG DR capsule Take 1 capsule (40 mg) by mouth daily  Qty: 90 capsule, Refills: 3    Associated Diagnoses: Gastroesophageal reflux disease without esophagitis      OXcarbazepine (TRILEPTAL) 150 MG tablet TAKE 3 TABLETS(450 MG) BY MOUTH AT BEDTIME  Qty: 270 tablet, Refills: 2    Associated Diagnoses: Facial neuralgia      senna-docusate (SENOKOT-S/PERICOLACE) 8.6-50 MG tablet Take 1 tablet by mouth At Bedtime    Associated Diagnoses: Constipation, unspecified constipation type      topiramate (TOPAMAX) 50 MG tablet Take 1 tablet (50 mg) by mouth At Bedtime  Qty: 90 tablet, Refills: 4    Associated Diagnoses: Intractable chronic migraine without aura and without status migrainosus      polyvinyl alcohol (ARTIFICIAL TEARS) 1.4 % ophthalmic solution Place 1 drop into both eyes every  hour as needed for dry eyes  Qty: 60 mL, Refills: 0    Associated Diagnoses: Lung infiltrate; Infection due to 2019 novel coronavirus; Sepsis, due to unspecified organism, unspecified whether acute organ dysfunction present (H); Pyogenic brain abscess; Encephalitis; ORTIZ (acute kidney injury) (H); Pyloric ulcer, chronic; Current mild episode of major depressive disorder without prior episode (H); Abnormal chest CT; Chronic pain syndrome; Hypercalcemia; Disorder of bone and cartilage; Pure hypercholesterolemia; Other migraine without status migrainosus, not intractable; Trigeminal neuralgia; Counseling regarding advanced directives and goals of care; Controlled substance agreement signed           Allergies   Allergies   Allergen Reactions     Contrast [Iohexol] Rash     Noticed during 08/2020 admission. Received IV contrast on 8/5     Chocolate Headache     Penicillins Rash       DATA  Most Recent 3 CBC's:Recent Labs   Lab Test 09/23/22  0837 09/16/22  0846 09/12/22  0540   WBC 7.2 9.0 9.8   HGB 10.5* 10.7* 10.0*   MCV 82 82 82   * 546* 437      Most Recent 3 BMP's:  Recent Labs   Lab Test 10/03/22  1006 10/01/22  1159 10/01/22  0757 09/30/22  0836 09/29/22  0832 09/28/22  0855 09/26/22  0800 09/24/22  1612 09/24/22  0745 09/22/22  1449 09/22/22  1255     --   --   --   --   --  136  --  137  --  132*   POTASSIUM 3.9  --   --  4.4 3.7   < > 4.2  --   --   --  4.1  4.1   CHLORIDE 100  --   --   --   --   --  104  --   --   --  101   CO2 25  --   --   --   --   --  24  --   --   --  25   BUN 18  --   --   --   --   --  18  --   --   --  10   CR 0.48*  --   --   --   --   --  0.52  --   --   --  0.43*   ANIONGAP 10  --   --   --   --   --  8  --   --   --  6   ADY 8.8  --   --   --   --   --  8.9  --   --   --  9.1   * 111* 114*  --   --   --  144*   < >  --    < > 105*    < > = values in this interval not displayed.     Most Recent 2 LFT's:  Recent Labs   Lab Test 09/22/22  6915 08/09/22  2493    AST 17 6   ALT 24 9   ALKPHOS 144 72   BILITOTAL 0.2 0.4     Recent Labs   Lab Test 07/25/22  0619 07/19/22  1901 02/07/22  0011   TSH 0.56 2.44 0.25*   T4  --   --  0.77   A1C  --  5.6  --      Results for orders placed or performed during the hospital encounter of 07/27/22   MR Brain w/o & w Contrast    Narrative    EXAM: MR BRAIN W/O and W CONTRAST  LOCATION: [facility]  DATE/TIME: 7/30/2022 8:51 PM    INDICATION: Follow up cerebral abscesses.  COMPARISON: 07/27/2022.  CONTRAST: 15mL Gadavist  TECHNIQUE: MRI brain without and with contrast.    FINDINGS: On the diffusion-weighted images there is restricted diffusion seen within the dependent portions of the lateral ventricles and a focal area involving the frontal horn of the left lateral ventricle.  This is stable in the interval. There is now   restricted diffusion within the dependent portion of the fourth ventricle. There is no other focus of acute ischemia or restricted diffusion. There is no discrete midline shift. There is no acute extra-axial fluid collection or acute intraparenchymal   hemorrhage. There are appropriate flow voids within the cavernous portions of the internal carotid arteries and the basilar artery. The areas of restricted diffusion to show enhancement and there is ependymal enhancement of the lateral ventricles left   greater than right along with the fourth ventricle and possibly within the interpeduncular cistern. This is consistent with ventriculitis. There is motion on axial images leading to suboptimal visualization of fine detail. There may be a mild increase in   cerebral vascularity in the sulci suggestive of possible leptomeningeal enhancement. Recommend follow-up imaging. There is diffuse vasogenic edema surrounding the frontal horn of the left lateral ventricle where there is enhancement and there is also   mild vasogenic edema along the ventricular system. There are scattered areas of high signal  within the periventricular and subcortical white matter consistent with mild diffuse small vessel ischemic disease. There are old lacunar infarcts involving the   right thalamus and the right side of the danyel. There are focal areas of encephalomalacia involving the cerebellar hemispheres bilaterally. The ventricular system, basal cisterns and the cortical sulci are consistent with diffuse volume loss.    There is no evidence of cerebellar tonsillar ectopia. The corpus callosum and the sella region have appropriate configuration and signal intensity for the patient's age. The orbit regions are unremarkable. There is no significant paranasal sinus disease.   The mastoid air cells and the middle ear regions are clear.      Impression    IMPRESSION:   1.  Ventriculitis lateral ventricles left greater than right, fourth fourth ventricle along with probable abscess in the region of the frontal horn of left lateral ventricle.  2. Possible enhancement interpeduncular cistern and increase cerebral vascularity suggestive of early leptomeningeal enhancement.  3.  Diffuse age related changes along with old lacunar infarcts right thalamus, right side of danyel and focal areas of encephalomalacia cerebellar hemispheres bilaterally.   MR Brain w Contrast Stereotactic    Narrative    EXAM: MR BRAIN W CONTRAST STEREOTACTIC  LOCATION: [facility]  DATE/TIME: 7/30/2022 8:54 PM    INDICATION: Ventricular size and cerebral abscess.  COMPARISON: 07/27/2022.  CONTRAST: 15 mL Gadavist  TECHNIQUE: MRI brain was performed with contrast via stereotactic protocol.    FINDINGS: On the enhanced images there is enhancement lining the ependymal surface of the ventricular system primarily the frontal horn of the left lateral ventricle and the fourth ventricle. There is also enhancement involving the dependent portions of   the lateral ventricles. There is motion on these images the patient had a hard time holding still. There  is vasogenic edema surrounding the ventricular system and primarily surrounding the frontal horn of the left lateral ventricle. There is no evidence   of a midline shift. There is stable diffuse age related changes along with old lacunar infarcts right thalamus and right danyel. Multiple areas of encephalomalacia are noted within the cerebellar hemispheres.      Impression    IMPRESSION:  1. Ventriculitis along with focal abscess formation frontal horn left lateral ventricle.  2.  Stable diffuse age related changes.  3.  MRI performed without contrast via stereotactic protocol.   XR Feeding Tube Placement    Narrative    [facility]  NASOJEJUNAL TUBE PLACEMENT WITH FLUOROSCOPIC GUIDANCE  7/30/2022 3:00 PM    INDICATION: Enteral nutrition.  FLUOROSCOPIC TIME: 5.2 minutes.  CONTRAST: 5  mL.    PROCEDURE: Lidocaine jelly was introduced through the right naris.  Using fluoroscopic guidance, a Czech feeding tube was introduced  through the naris and advanced into the stomach. Attempts were made at  manipulating the feeding tube through the gastric antrum without  success.    Patient tolerated the procedure well without immediate complications.       CONCLUSION:  1.  Fluoroscopic placement of nasogastric feeding tube. Unsuccessful  in advancing of the catheter beyond the pylorus into the duodenum,  despite lengthy attempts.    RADHA VILLA MD         SYSTEM ID:  [id number]   CT Head w/o Contrast    Narrative    EXAM: CT HEAD W/O CONTRAST  LOCATION: [facility]  DATE/TIME: 7/31/2022 10:45 PM    INDICATION: EVD  COMPARISON: MRI 07/30/2022, CT 7/25/2022.  TECHNIQUE: Routine CT Head without IV contrast. Multiplanar reformats. Dose reduction techniques were used.    FINDINGS:  INTRACRANIAL CONTENTS: Left posterior frontal ventriculostomy catheter with tip in the body of left lateral ventricle. Ventricular size unchanged from prior. No intracranial hemorrhage, extraaxial collection, or mass  effect.  No CT evidence of acute   infarct. Moderate edema around the left ventricle frontal horn is unchanged. Mild presumed chronic small vessel ischemic changes. Mild generalized volume loss. No hydrocephalus.     VISUALIZED ORBITS/SINUSES/MASTOIDS: Prior bilateral cataract surgery. Visualized portions of the orbits are otherwise unremarkable. No paranasal sinus mucosal disease. No middle ear or mastoid effusion.    BONES/SOFT TISSUES: No acute abnormality.      Impression    IMPRESSION:  1.  Left frontal ventriculostomy catheter with tip in the body of the left lateral ventricle. Ventricular size is unchanged from prior.  2.  Otherwise unchanged when compared to CT of 7/25/2022.   CT Head w/o Contrast    Narrative    CT SCAN OF THE HEAD WITHOUT CONTRAST August 3, 2022 2:29 PM     HISTORY: Extraventricular drain.    TECHNIQUE: Axial images of the head and coronal reformations without  IV contrast material. Radiation dose for this scan was reduced using  automated exposure control, adjustment of the mA and/or kV according  to patient size, or iterative reconstruction technique.    COMPARISON: Several prior comparisons, most recent head CT 7/31/2022.    FINDINGS: Left frontal approach ventriculostomy catheter tip  terminates in the region of the body of the left lateral ventricle.  Left lateral ventricle is now completely decompressed and small in  size. New thin hypodense bilateral subdural fluid collections  measuring up to 0.4 cm on the left and 0.3 cm on the right.  Hypodensity around the frontal horn of the left lateral ventricle  involving the periventricular white matter may be slightly improved  since prior although this could be due to differences in technique. No  definite new intraparenchymal hemorrhage. Basal cisterns are patent.  Chronic appearing area of encephalomalacia in the right cerebellum.    The visualized portions of the sinuses and mastoids appear normal. The  bony calvarium and bones of the  skull base appear intact.       Impression    IMPRESSION:     1. Left lateral ventricle has markedly decompressed since prior head  CT 7/31/2022 and now is smaller in size than the right.  2. New hypodense subdural fluid collections over the cerebral  convexity superiorly. These could represent subdural hygromas or  hematomas.  3. Hypodense edema around the frontal horn of the left lateral  ventricle may be improved since 7/31/2022 although this could be due  to differences in technique. This may be better assessed with brain  MRI if clinically necessary.      PATRICIA TELLES MD         SYSTEM ID:  [id number]   CT Head w/o Contrast    Narrative    EXAM: CT HEAD W/O CONTRAST  LOCATION: [facility]  DATE/TIME: 8/6/2022 5:00 AM    INDICATION: assess ventricular system  COMPARISON: 8/3/2022  TECHNIQUE: Routine CT Head without IV contrast. Multiplanar reformats. Dose reduction techniques were used.    FINDINGS:  INTRACRANIAL CONTENTS: Left frontal approach ventriculostomy catheter crossing the body of the left lateral ventricle. Left lateral ventricle is increased in size compared to prior consistent with interval mild hydrocephalus. Stable third ventricle,   right lateral ventricle and fourth ventricle. Edema in the left orbital frontal region is similar. Mild small vessel ischemic disease. There is mild to moderate atrophy.     VISUALIZED ORBITS/SINUSES/MASTOIDS: No intraorbital abnormality. No paranasal sinus mucosal disease. No middle ear or mastoid effusion.    BONES/SOFT TISSUES: No acute abnormality.      Impression    IMPRESSION:  1.  Interval development of hydrocephalus involving the left lateral ventricle and temporal horn when compared to prior. The ventricular is otherwise stable in size.  2.  Stable positioning of the left frontal approach ventriculostomy catheter.    Exam discussed with Dr. Layton at 6:17 AM   CT Head w/o Contrast    Narrative    EXAM: CT HEAD W/O  CONTRAST  LOCATION: [facility]  DATE/TIME: 8/7/2022 4:58 AM    INDICATION: EVD clamped, evaluate hydrocephalus  COMPARISON: 8/6/2022  TECHNIQUE: Routine CT Head without IV contrast. Multiplanar reformats. Dose reduction techniques were used.    FINDINGS:  INTRACRANIAL CONTENTS: Left frontal approach ventriculostomy catheter terminates along the body of the left lateral ventricle. Essentially stable hydrocephalus of the left lateral ventricle. Decompressed right lateral ventricle. No CT evidence of acute   infarct. Stable low-density changes in the left frontal lobe. Chronic lacunar infarcts bilateral cerebellar hemispheres    VISUALIZED ORBITS/SINUSES/MASTOIDS: No intraorbital abnormality. No paranasal sinus mucosal disease. No middle ear or mastoid effusion.    BONES/SOFT TISSUES: No acute abnormality.      Impression    IMPRESSION:  1.  Stable hydrocephalus in the left lateral ventricle.  2.  No significant change from prior.   CT Head w/o Contrast    Narrative    CT OF THE HEAD WITHOUT CONTRAST   8/8/2022 10:58 AM     COMPARISON: 8/7/2022.    HISTORY:  External ventricular drain.     TECHNIQUE: Axial CT images of the head from the skull base to the  vertex were acquired without IV contrast. Dose reduction techniques  were used.     FINDINGS: Image quality is degraded by motion.    INTRACRANIAL CONTENTS: Redemonstrated left frontal approach external  ventricular drain terminating within the body of the left lateral  ventricle. Asymmetric dilation of the left lateral ventricle similar  to the previous study. The ventricular system is otherwise  unremarkable. No acute hemorrhage or extra-axial collection. Vasogenic  edema within the left frontal white matter again noted. No mass  effect. Chronic infarctions within cerebellar hemispheres.    VISUALIZED ORBITS/SINUSES/MASTOIDS: No significant orbital  abnormality.  No significant paranasal sinus mucosal disease. No  significant middle ear or  mastoid effusion.    OSSEOUS STRUCTURES/SOFT TISSUES: No significant abnormality.      Impression    IMPRESSION:  1.  Unchanged left frontal approach ventriculostomy with stable  dilation of the left lateral ventricle.  2.  Unchanged vasogenic edema left frontal white matter.    STEPH RAMIREZ MD         SYSTEM ID:  [id number]   IR Gastrostomy Tube Percutaneous Plcmnt    Narrative    GASTROSTOMY TUBE PLACEMENT 8/16/2022 3:27 PM    An NG tube was placed in the stomach.  The stomach was distended with  air through the tube.  Ultrasound was used to aishwarya the inferior edge  of the lobe of the liver.  From a subxiphoid subhepatic approach,  lidocaine was administered on the skin of the anterior abdomen.  A  short incision was made at this point.  Two T fasteners were placed in  the body of the stomach in standard fashion.  A third puncture was  made into the body of the stomach and a wire directed into the fundus.   A  peel-away sheath was placed in the stomach and a gastrostomy  catheter was advanced over the wire into the stomach.  The balloon was  inflated  and pulled back against the anterior wall of the stomach.   The sheath was removed.  Contrast was injected to confirm the catheter  position in the stomach.  The catheter was then secured in place.  The  T fasteners were secured on the skin with interrupted stitches.  There  were no initial complications.  The tube can be used in 6 hours if the  patient's exam is stable.    Sedation: A moderate level of sedation was achieved with 0.5 mg of  midazolam                  25 mcg fentanyl    Sedation given by our nursing staff under my supervision.    Sedation time: 11 minutes    Fluoro time:  1.2 minutes  Air Kerma: 4.9 mGy  Local anesthetic: 19 mL of 1% lidocaine.  Contrast: 25 mL of Omnipaque 240 administered into the enteric tract  without complication.      Impression    IMPRESSION: Successful placement of 18 Afghan YOLI gastrostomy tube and  two T-tacks. Per  routine, tube may be used after 6 hours if vital  signs remain normal and no evidence of complication (peritoneal  signs).    TONG RUBALCAVA MD         SYSTEM ID:  [id number]   XR Fluoro Port Time 0/1 Hour    Narrative    FEEDING TUBE PLACEMENT   8/16/2022 2:32 PM    HISTORY: Plugged NG tube. Request for replacement or unplugging of the  tube. Indication scheduled to have percutaneous gastrostomy tube  placement.    COMPARISON: None    FINDINGS: 0.1 minutes of fluoroscopy were utilized to verify position  of the feeding tube. A wire was passed through the existing feeding  tube and water was used for irrigation. The tube was able to be  unplugged. The final tube position was within the stomach which is  appropriate for the subsequent percutaneous gastrostomy tube placement  procedure. Estimated blood loss during the procedure was less than 5  mL. No specimens collected. There were no complications of the  procedure.    Medications used: None  Images Obtained: 2      Impression    IMPRESSION: Successful unplugging of existing feeding tube.         NATI DEAN PA-C         SYSTEM ID:  [id number]   CT Head w/o & w Contrast    Narrative    EXAM: CT HEAD W/O and W CONTRAST  LOCATION: [facility]  DATE/TIME: 8/17/2022 5:33 PM    INDICATION: Follow up on ventriculitis and brain abscess. Patient refused MRI  COMPARISON: MRI brain 07/27/2022, CT head 8/7/2022 and 8/8/2022.  CONTRAST: 75mL Isovue 370  TECHNIQUE: Routine CT Head without and with IV contrast. Dose reduction techniques were used.    FINDINGS:  INTRACRANIAL CONTENTS: Similar appearance of left frontal edema and enhancing subependymal abscess, similar in appearance compared to the 07/27/2022 MRI. Interval removal of the left frontal approach EVD with interval development of mild hydrocephalus,   particularly involving the left lateral ventricle which has mild to moderately increased in size. Mild subependymal enhancement of the ventricles,  compatible with known ventriculitis. Trace pneumocephalus. Minimal rightward midline shift, unchanged. No   new hemorrhage or large territorial infarct. Redemonstration of remote bilateral cerebellar infarcts. Cerebellar tonsils are above the foramen magnum.     VISUALIZED ORBITS/SINUSES/MASTOIDS: No intraorbital abnormality. No significant paranasal sinus mucosal disease. No middle ear or mastoid effusion.    BONES/SOFT TISSUES: No acute abnormality. Left frontal kelsey hole.      Impression    IMPRESSION:  1.  Interval removal of the left frontal approach EVD with new hydrocephalus since 08/08/2022.  2.  Allowing for differences in technique, the left frontal parenchymal abscess with ventriculitis and cerebritis are otherwise not significantly changed in appearance.    Dr. Moralez was contacted by me on 8/17/2022 7:27 PM and verbalized understanding of the critical result.    XR Lumbar Puncture Spinal Tap Diag    Narrative    EXAM: XR LUMBAR PUNCTURE SPINAL TAP DIAGNOSTIC  8/18/2022 12:25 PM       History:  LP for opening pressure and repeat CSF cultures, admit for  severe sepsis and ventriculitis with abscess now with mild to moderate  increased hydro.     PROCEDURE: The patient consented for a lumbar puncture. The benefits  and risks of the procedure were explained to the patient, including  but not limited to worsening headache, hemorrhage, infection, lower  extremity pain, or nerve root injury. The patient was sterilely  prepped and draped with the patient in the supine position, over the  lower back. Under fluoroscopic guidance, the interlaminar spaces were  noted. 1% lidocaine was administered for local anesthetic over the  L2-3 interlaminar space, and a 22 gauge needle was advanced into the  thecal sac under fluoroscopic guidance.  There was initial aspiration  of clear CSF. Approximately 8.5 mL of clear CSF was then aspirated.   The needle was removed. There were no immediate complications  associated with  the procedure.   Estimated blood loss during the  procedure was less than 5 mL.    Opening Pressure: 12 cm of water. Aspiration required in order to  obtain CSF.  Fluoro time: 0.1 minutes   Images Obtained: 2      Impression    IMPRESSION: Successful lumbar puncture.    NATI DEAN PA-C         SYSTEM ID:  [id number]   CT Head w/o Contrast    Narrative    CT OF THE HEAD WITHOUT CONTRAST 8/19/2022 12:40 PM     COMPARISON: Head CT 8/17/2022, brain MRI 7/30/2022.    HISTORY: Headache. No applicable indication.    TECHNIQUE: 5 mm thick axial CT images of the head were acquired  without IV contrast material.    FINDINGS: Hypodensity in the white matter of the anterior inferior  frontal lobe on the left adjacent to the frontal horn of the left  lateral ventricle again noted likely representing vasogenic edema  related to ventriculitis of the left lateral ventricle. Lateral  ventricle remains enlarged compared to the third and right lateral  ventricles but no evidence for worsening hydrocephalus. There is  rightward bowing of the septum pellucidum but no significant rightward  midline shift. There is moderate diffuse cerebral volume loss. There  are subtle patchy areas of decreased density in the cerebral white  matter bilaterally that are consistent with sequela of chronic small  vessel ischemic disease. There are no extra-axial fluid collections.     No intracranial hemorrhage, mass or recent infarct.    The visualized paranasal sinuses are well-aerated. There is no  mastoiditis. There are no fractures of the visualized bones.      Impression    IMPRESSION:   1. Stable presumed vasogenic edema in the white matter of the anterior  inferior left frontal lobe related to ventriculitis of the left  lateral ventricle.  2. Stable ventriculomegaly of the left lateral ventricle. Interval  decrease in volume of gas in the left lateral ventricle since the  recent comparison study.  3. Diffuse cerebral volume loss and cerebral  white matter changes  consistent with chronic small vessel ischemic disease.      Radiation dose for this scan was reduced using automated exposure  control, adjustment of the mA and/or kV according to patient size, or  iterative reconstruction technique.    CHEO POLK MD         SYSTEM ID:  [id number]   CT Head w/o Contrast    Narrative    EXAM: CT HEAD W/O CONTRAST  LOCATION: [facility]  DATE/TIME: 8/29/2022 7:18 PM    INDICATION: headache, h o ventriculostomy  COMPARISON: 8/19/2022  TECHNIQUE: Routine CT Head without IV contrast. Multiplanar reformats. Dose reduction techniques were used.    FINDINGS:  INTRACRANIAL CONTENTS: No intracranial hemorrhage, extraaxial collection, or mass effect.  No CT evidence of acute infarct. Moderate presumed chronic small vessel ischemic changes. The left lateral ventricle has decreased in size. Slight interval   expansion of the right lateral ventricle. No hydrocephalus. Chronic and are infarcts bilateral cerebellum.     VISUALIZED ORBITS/SINUSES/MASTOIDS: No intraorbital abnormality. No paranasal sinus mucosal disease. No middle ear or mastoid effusion.    BONES/SOFT TISSUES: No acute abnormality.      Impression    IMPRESSION:  1.  The previously noted hydrocephalic left lateral ventricle has decreased in size. The left temporal horn remains slightly dilated but no samuel hydrocephalus.  2.  No acute findings.   XR Video Swallow with SLP or OT    Narrative    VIDEO SWALLOWING EVALUATION   9/15/2022 11:51 AM     HISTORY: Brain abscess, encephalopathy    COMPARISON: None.    FLUOROSCOPY TIME: None     Number of cine runs: 0.7 minutes    FINDINGS:    Thin: Premature spill to the vallecula. Mild penetration. Otherwise  normal.    Slightly thick: Not administered.    Mildly thick: Not administered.    Moderately thick: Not administered.    Puree: Normal.    Semisolid: Not administered.    Regular: Not administered.    This study only includes the cervical  esophagus.    CHEO POLK MD         SYSTEM ID:  [id number]   XR Abdomen 2 Views    Narrative    ABDOMEN TWO-THREE VIEW September 28, 2022 10:34 AM     HISTORY: Complains of abdominal pain at PEG tube site.    COMPARISON: None.    FINDINGS: PEG tube projects within the stomach in these views. Small  amount of stool. No free air. There are no air filled distended loops  of small bowel. The colon is not distended. The lung bases are  unremarkable.      Impression    IMPRESSION: Nonobstructed bowel gas pattern.    TAMARA THOMAS MD         SYSTEM ID:  [id number]

## 2022-10-05 ENCOUNTER — TRANSITIONAL CARE UNIT VISIT (OUTPATIENT)
Dept: GERIATRICS | Facility: CLINIC | Age: 77
End: 2022-10-05
Payer: COMMERCIAL

## 2022-10-05 ENCOUNTER — PATIENT OUTREACH (OUTPATIENT)
Dept: CARE COORDINATION | Facility: CLINIC | Age: 77
End: 2022-10-05

## 2022-10-05 VITALS
HEART RATE: 86 BPM | OXYGEN SATURATION: 95 % | RESPIRATION RATE: 18 BRPM | SYSTOLIC BLOOD PRESSURE: 101 MMHG | HEIGHT: 65 IN | TEMPERATURE: 98.4 F | DIASTOLIC BLOOD PRESSURE: 64 MMHG | BODY MASS INDEX: 22.93 KG/M2

## 2022-10-05 DIAGNOSIS — U07.1 INFECTION DUE TO 2019 NOVEL CORONAVIRUS: ICD-10-CM

## 2022-10-05 DIAGNOSIS — G89.29 CHRONIC BACK PAIN, UNSPECIFIED BACK LOCATION, UNSPECIFIED BACK PAIN LATERALITY: ICD-10-CM

## 2022-10-05 DIAGNOSIS — A41.9 SEPSIS, DUE TO UNSPECIFIED ORGANISM, UNSPECIFIED WHETHER ACUTE ORGAN DYSFUNCTION PRESENT (H): ICD-10-CM

## 2022-10-05 DIAGNOSIS — D64.9 ANEMIA, UNSPECIFIED TYPE: ICD-10-CM

## 2022-10-05 DIAGNOSIS — E87.8 ELECTROLYTE IMBALANCE: ICD-10-CM

## 2022-10-05 DIAGNOSIS — M54.9 CHRONIC BACK PAIN, UNSPECIFIED BACK LOCATION, UNSPECIFIED BACK PAIN LATERALITY: ICD-10-CM

## 2022-10-05 DIAGNOSIS — F41.8 DEPRESSION WITH ANXIETY: ICD-10-CM

## 2022-10-05 DIAGNOSIS — A04.72 C. DIFFICILE COLITIS: ICD-10-CM

## 2022-10-05 DIAGNOSIS — J15.9 BACTERIAL PNEUMONIA: ICD-10-CM

## 2022-10-05 DIAGNOSIS — Z71.89 ADVANCED DIRECTIVES, COUNSELING/DISCUSSION: ICD-10-CM

## 2022-10-05 DIAGNOSIS — K21.00 GASTROESOPHAGEAL REFLUX DISEASE WITH ESOPHAGITIS WITHOUT HEMORRHAGE: ICD-10-CM

## 2022-10-05 DIAGNOSIS — G50.0 TRIGEMINAL NEURALGIA: ICD-10-CM

## 2022-10-05 DIAGNOSIS — R53.81 PHYSICAL DECONDITIONING: ICD-10-CM

## 2022-10-05 DIAGNOSIS — G06.0 PYOGENIC BRAIN ABSCESS: Primary | ICD-10-CM

## 2022-10-05 PROCEDURE — 99310 SBSQ NF CARE HIGH MDM 45: CPT | Performed by: NURSE PRACTITIONER

## 2022-10-05 PROCEDURE — 36415 COLL VENOUS BLD VENIPUNCTURE: CPT | Performed by: NURSE PRACTITIONER

## 2022-10-05 PROCEDURE — P9604 ONE-WAY ALLOW PRORATED TRIP: HCPCS | Performed by: NURSE PRACTITIONER

## 2022-10-05 PROCEDURE — 86481 TB AG RESPONSE T-CELL SUSP: CPT | Performed by: NURSE PRACTITIONER

## 2022-10-05 NOTE — PROGRESS NOTES
[facility]    Background: Transitional Care Management program auto-identified and prompting a chart review by [facility] team.    Assessment: Upon chart review, [facility] Team member will cancel/close this episode of Transitional Care Management program due to reason below:    Patient discharged to TCU/ARU/LTACH and is established within [facility]. Referral created for Primary Care-Care Coordination program.    Plan: Transitional Care Management episode closed per reason above.      ANALISA Sánchez  [phone]  [facility]    *Connected Care Resource Team does NOT follow patient ongoing. Referrals are identified based on internal discharge reports and the outreach is to ensure patient has an understanding of their discharge instructions.

## 2022-10-06 DIAGNOSIS — G50.0 TRIGEMINAL NEURALGIA: ICD-10-CM

## 2022-10-06 DIAGNOSIS — G43.009 MIGRAINE WITHOUT AURA AND WITHOUT STATUS MIGRAINOSUS, NOT INTRACTABLE: ICD-10-CM

## 2022-10-06 RX ORDER — ACETAMINOPHEN AND CODEINE PHOSPHATE 300; 30 MG/1; MG/1
1 TABLET ORAL EVERY 6 HOURS PRN
Qty: 30 TABLET | Refills: 0 | Status: SHIPPED | OUTPATIENT
Start: 2022-10-06 | End: 2022-10-17

## 2022-10-07 LAB
GAMMA INTERFERON BACKGROUND BLD IA-ACNC: 0.03 IU/ML
M TB IFN-G BLD-IMP: ABNORMAL
M TB IFN-G CD4+ BCKGRND COR BLD-ACNC: 0.39 IU/ML
MITOGEN IGNF BCKGRD COR BLD-ACNC: 0.01 IU/ML
MITOGEN IGNF BCKGRD COR BLD-ACNC: 0.02 IU/ML
QUANTIFERON MITOGEN: 0.42 IU/ML
QUANTIFERON NIL TUBE: 0.03 IU/ML
QUANTIFERON TB1 TUBE: 0.04 IU/ML
QUANTIFERON TB2 TUBE: 0.05

## 2022-10-09 ENCOUNTER — LAB REQUISITION (OUTPATIENT)
Dept: LAB | Facility: CLINIC | Age: 77
End: 2022-10-09

## 2022-10-09 DIAGNOSIS — R53.1 WEAKNESS: ICD-10-CM

## 2022-10-10 LAB
ANION GAP SERPL CALCULATED.3IONS-SCNC: 10 MMOL/L (ref 3–14)
BUN SERPL-MCNC: 7 MG/DL (ref 7–30)
CALCIUM SERPL-MCNC: 9.5 MG/DL (ref 8.5–10.1)
CHLORIDE BLD-SCNC: 101 MMOL/L (ref 94–109)
CO2 SERPL-SCNC: 27 MMOL/L (ref 20–32)
CREAT SERPL-MCNC: 0.55 MG/DL (ref 0.52–1.04)
ERYTHROCYTE [DISTWIDTH] IN BLOOD BY AUTOMATED COUNT: 18.5 % (ref 10–15)
GFR SERPL CREATININE-BSD FRML MDRD: >90 ML/MIN/1.73M2
GLUCOSE BLD-MCNC: 61 MG/DL (ref 70–99)
HCT VFR BLD AUTO: 32.7 % (ref 35–47)
HGB BLD-MCNC: 9.9 G/DL (ref 11.7–15.7)
MCH RBC QN AUTO: 25.3 PG (ref 26.5–33)
MCHC RBC AUTO-ENTMCNC: 30.3 G/DL (ref 31.5–36.5)
MCV RBC AUTO: 83 FL (ref 78–100)
PLATELET # BLD AUTO: 538 10E3/UL (ref 150–450)
POTASSIUM BLD-SCNC: 3.5 MMOL/L (ref 3.4–5.3)
RBC # BLD AUTO: 3.92 10E6/UL (ref 3.8–5.2)
SODIUM SERPL-SCNC: 138 MMOL/L (ref 133–144)
WBC # BLD AUTO: 9.3 10E3/UL (ref 4–11)

## 2022-10-10 PROCEDURE — 82310 ASSAY OF CALCIUM: CPT | Performed by: NURSE PRACTITIONER

## 2022-10-10 PROCEDURE — 85048 AUTOMATED LEUKOCYTE COUNT: CPT | Performed by: NURSE PRACTITIONER

## 2022-10-10 PROCEDURE — 85027 COMPLETE CBC AUTOMATED: CPT | Performed by: NURSE PRACTITIONER

## 2022-10-10 PROCEDURE — 36415 COLL VENOUS BLD VENIPUNCTURE: CPT | Performed by: NURSE PRACTITIONER

## 2022-10-10 PROCEDURE — P9604 ONE-WAY ALLOW PRORATED TRIP: HCPCS | Performed by: NURSE PRACTITIONER

## 2022-10-10 NOTE — PROGRESS NOTES
[place] [facility]  INITIAL VISIT NOTE  October 11, 2022    PRIMARY CARE PROVIDER AND CLINIC:  [facility]illo [address] [facility] [id number]    CHIEF COMPLAINT:  Hospital follow-up/Initial visit    HPI:    Julisa Chaney is a 77 year old  (1945) female who was seen at [place] on [facility] on October 11, 2022 for an initial visit.     Medical history is notable for dyslipidemia, dysphagia, aspiration pneumonia, GERD, gastritis, duodenal stricture, chronic anemia, iron deficiency, trigeminal neuralgia, migraine, depression, anxiety, and chronic pain syndrome.    Summary of hospital course:  Patient was originally hospitalized at [facility] from July 19 through July 27, 2022 for acute COVID-19 infection with bacterial superinfection.  She was treated with 5 days of remdesivir and broad-spectrum antibiotics including meropenem and vancomycin.  Brain MRI revealed ventriculitis as well as focal abscess formation in the frontal horn of left lateral ventricle.  She was then transferred to [facility] where she stayed from July 27 through October 4, 2022. Neurosurgery was consulted and left frontal external ventricular drain was placed on July 31, 2022.  Drain was eventually removed on August 8.  Hospital course was complicated by C. difficile colitis, dysphagia, s/p G-tube on August 16, 2022, and worsening depressive symptoms with suicidal thoughts.  Rectal tube was placed and she was treated with vancomycin for C. difficile infection.  Dysphagia improved and G-tube was removed on October 3.  She was transfused with 1 unit of PRBC and infused with IV iron for acute anemia.  She was evaluated by psychiatry and neurology.  She was started on Keppra for anxiety.  Trigeminal neuralgia/pain was managed with Tylenol #3, nortriptyline, oxcarbazepine, and topiramate.    TCU was recommended per therapies.    Patient is admitted to this facility for medical  management, nursing care, and rehab.     Of note, history was obtained from patient, facility RN, and extensive review of the chart.    Today's visit:  Patient was seen in her room, while sitting in a wheelchair.  She appears frail.  She feels generally weak and has poor appetite.  She reports no bowel movement for several days.  She denies fever, chills, cough, sputum, chest pain, dyspnea, palpitation, nausea, vomiting, abdominal pain, or urinary symptoms.      CODE STATUS:   CPR/Full code     PAST MEDICAL HISTORY:    Left lateral ventriculitis with ventricular abscess, s/p left frontal ventriculostomy on July 31, 2022; drain was removed on August 8, 2022  COVID-19 infection in July 2022  Bacterial pneumonia in July 2022  Aspiration pneumonia in February 2022  Dyslipidemia  Dysphagia, s/p G-tube from August 16, 2022 through October 3, 2022  GERD  Gastritis  Duodenal stricture  C. difficile colitis in August 2022  Chronic anemia, baseline hemoglobin 9-11  Iron deficiency  Trigeminal neuralgia  Migraine  Depression  Anxiety  Chronic pain syndrome  Chronic back pain    Past Medical History:   Diagnosis Date     Aspiration pneumonia (H) 02/2022     Depression      High cholesterol      Migraines      Pyloric ulcer, chronic      Sepsis (H) 08/10/2020     Trigeminal neuralgia        PAST SURGICAL HISTORY:   Past Surgical History:   Procedure Laterality Date     HYSTERECTOMY       IR GASTROSTOMY TUBE PERCUTANEOUS PLCMNT  8/16/2022     PICC TRIPLE LUMEN PLACEMENT  7/22/2022          DE ESOPHAGOGASTRODUODENOSCOPY TRANSORAL DIAGNOSTIC N/A 8/13/2020    Procedure: ESOPHAGOGASTRODUODENOSCOPY (EGD);  Surgeon: Nathan Smalls MD;  [place] [facility] [place]  Service: Gastroenterology     DE ESOPHAGOGASTRODUODENOSCOPY TRANSORAL DIAGNOSTIC N/A 8/14/2020    Procedure: ESOPHAGOGASTRODUODENOSCOPY (EGD), PYLORIC DILATION;  Surgeon: Nathan Smalls MD;  Location: [facility] OR;  Service: Gastroenterology     VENTRICULOSTOMY  Left 2022    Procedure: LEFT FRONTAL VENTRICULOSTOMY;  Surgeon: Brady Heredia MD;  Location: [facility] COLONOSCOPY W/WO BRUSH/WASH N/A 2020    Procedure: COLONOSCOPY WITH POLYPECTOMY;  Surgeon: Nathan Smalls MD;  Location: [facility] [place];  Service: Gastroenterology       FAMILY HISTORY:   Family History   Problem Relation Age of Onset     Cancer Father      Testicular cancer Grandchild 17.00     Breast Cancer Mother      Breast Cancer Sister      Breast Cancer Maternal Aunt      Breast Cancer Sister        SOCIAL HISTORY:  Social History     Tobacco Use     Smoking status: Former     Packs/day: 1.00     Years: 50.00     Pack years: 50.00     Types: Cigarettes     Quit date: 2014     Years since quittin.9     Smokeless tobacco: Never   Substance Use Topics     Alcohol use: No       MEDICATIONS:  Current Outpatient Medications   Medication Sig Dispense Refill     acetaminophen (TYLENOL) 325 MG tablet Take 2 tablets (650 mg) by mouth every 6 hours as needed for pain       acetaminophen-codeine (TYLENOL W/CODEINE #3) 300-30 MG per tablet Take 1 tablet by mouth every 6 hours as needed for severe pain 30 tablet 0     cholecalciferol, vitamin D3, 50 mcg (2,000 unit) Tab [CHOLECALCIFEROL, VITAMIN D3, 50 MCG (2,000 UNIT) TAB] Take 1 tablet (2,000 Units total) by mouth daily. One tab daily 90 tablet 4     desonide (DESOWEN) 0.05 % cream [DESONIDE (DESOWEN) 0.05 % CREAM] Apply to affected area 2 times daily (Patient taking differently: Apply topically 2 times daily as needed) 60 g 1     ferrous sulfate 325 (65 FE) MG tablet [FERROUS SULFATE 325 (65 FE) MG TABLET] Take 1 tablet (325 mg total) by mouth daily with breakfast. 30 tablet 0     gabapentin (NEURONTIN) 100 MG capsule Take 1 capsule (100 mg) by mouth 3 times daily as needed for neuropathic pain or other (anxiety or pain)       levETIRAcetam (KEPPRA) 750 MG tablet Take 1 tablet (750 mg) by mouth 2 times daily       Lidocaine (LIDOCARE)  "4 % Patch Place 1 patch onto the skin every 24 hours To prevent lidocaine toxicity, patient should be patch free for 12 hrs daily.  Back pain       mirtazapine (REMERON) 15 MG tablet Take 1 tablet (15 mg) by mouth At Bedtime 30 tablet 1     multivitamin (DAILY-DAVID) per tablet [MULTIVITAMIN (DAILY-DAVID) PER TABLET] Take 1 tablet by mouth daily. 100 tablet 3     nortriptyline (PAMELOR) 25 MG capsule Take 1 capsule (25 mg) by mouth 2 times daily       omeprazole (PRILOSEC) 40 MG DR capsule Take 1 capsule (40 mg) by mouth daily 90 capsule 3     OXcarbazepine (TRILEPTAL) 150 MG tablet TAKE 3 TABLETS(450 MG) BY MOUTH AT BEDTIME 270 tablet 2     polyvinyl alcohol (ARTIFICIAL TEARS) 1.4 % ophthalmic solution Place 1 drop into both eyes every hour as needed for dry eyes 60 mL 0     QUEtiapine (SEROQUEL) 25 MG tablet Take 1 tablet (25 mg) by mouth 2 times daily       QUEtiapine (SEROQUEL) 25 MG tablet Take 0.5 tablets (12.5 mg) by mouth 2 times daily as needed       QUEtiapine (SEROQUEL) 50 MG tablet Take 1 tablet (50 mg) by mouth At Bedtime       senna-docusate (SENOKOT-S/PERICOLACE) 8.6-50 MG tablet Take 1 tablet by mouth At Bedtime       topiramate (TOPAMAX) 50 MG tablet Take 1 tablet (50 mg) by mouth At Bedtime 90 tablet 4       Post Medication Reconciliation Status: Previously reconciled during another visit; continue medications without change.      ALLERGIES:  Allergies   Allergen Reactions     Contrast [Iohexol] Rash     Noticed during 08/2020 admission. Received IV contrast on 8/5     Chocolate Headache     Penicillins Rash       ROS:  10 point ROS were negative other than the symptoms noted above in the HPI.    PHYSICAL EXAM:  Vital signs were reviewed in the chart.  Vital Signs: /69   Pulse 81   Temp 98.2  F (36.8  C)   Resp 18   Ht 1.651 m (5' 5\")   Wt 53.5 kg (118 lb)   SpO2 94%   BMI 19.64 kg/m    General: Frail appearing but comfortable and in no acute distress  HEENT: Conjunctival pallor, no " scleral icterus or injection, moist oral mucosa  Cardiovascular: Normal S1, S2, RRR  Respiratory: Lungs clear to auscultation bilaterally; no crackles, wheezing, or rhonchi  GI: Abdomen soft, non-tender, non-distended, +BS  Extremities: No LE edema  Neuro: CX II-XII grossly intact; ROM in all four extremities grossly intact  Psych: Alert and oriented almost x3; normal affect  Skin: No acute rash    LABORATORY/IMAGING DATA:  All relevant labs and imaging data in [facility] and/or Care Everywhere were personally reviewed today.    CBC (October 10, 2022): White count 9.3, hemoglobin 9.9, platelet count 538,000, MCV 83    Chemistry profile (October 10, 2022): Sodium 138, potassium 3.5, BUN 7, creatinine 0.55, glucose 61, calcium 9.5    Most Recent 3 CBC's:Recent Labs   Lab Test 09/23/22  0837 09/16/22  0846 09/12/22  0540   WBC 7.2 9.0 9.8   HGB 10.5* 10.7* 10.0*   MCV 82 82 82   * 546* 437     Most Recent 3 BMP's:Recent Labs   Lab Test 10/03/22  1006 10/01/22  1159 10/01/22  0757 09/30/22  0836 09/29/22  0832 09/28/22  0855 09/26/22  0800 09/24/22  1612 09/24/22  0745 09/22/22  1449 09/22/22  1255     --   --   --   --   --  136  --  137  --  132*   POTASSIUM 3.9  --   --  4.4 3.7   < > 4.2  --   --   --  4.1  4.1   CHLORIDE 100  --   --   --   --   --  104  --   --   --  101   CO2 25  --   --   --   --   --  24  --   --   --  25   BUN 18  --   --   --   --   --  18  --   --   --  10   CR 0.48*  --   --   --   --   --  0.52  --   --   --  0.43*   ANIONGAP 10  --   --   --   --   --  8  --   --   --  6   ADY 8.8  --   --   --   --   --  8.9  --   --   --  9.1   * 111* 114*  --   --   --  144*   < >  --    < > 105*    < > = values in this interval not displayed.     Most Recent 2 LFT's:Recent Labs   Lab Test 09/22/22  1255 08/09/22  0418   AST 17 6   ALT 24 9   ALKPHOS 144 72   BILITOTAL 0.2 0.4         ASSESSMENT/PLAN:  Left lateral ventriculitis with ventricular abscess, s/p left frontal ventriculostomy  on July 31, 2022.  Drain was removed on August 8, 2022.  Cultures negative.  Brain CSF flow cytometry with no evidence of malignancy.  Completed 6 weeks of IV antibiotics on September 8.  Patient is currently afebrile and has no gross neurologic deficits.  Plan:  Follow-up with neurosurgery as directed    Dysphagia, s/p G-tube from August 16, 2022 through October 3, 2022.  Patient is currently on dysphagia diet level 3 with regular consistency.  Plan:  Continue DDL3 with regular consistency  Continue SLP evaluation and therapy    Recent history of COVID-19 infection with superimposed bacterial pneumonia in July 2022.  Completed the course of antibiotic therapy inpatient.  She is now COVID recovered.  She is stable from a respiratory standpoint.  Plan:  Monitor respiratory status    Recent history of C. difficile colitis in August 2022.  Patient required rectal tube; she was treated with vancomycin.  Course of vancomycin is now completed.  She currently has no diarrhea.  Plan:  Monitor for recurrence    Dyslipidemia.  Not medically treated.  Plan:  Follow-up as outpatient    GERD,  History of gastritis,  History of duodenal stricture.  Plan:  Continue omeprazole 40 mg daily    Acute on chronic anemia,   Iron deficiency.  Baseline hemoglobin 9-11.  Sarbjit hemoglobin of 6.9 on August 8.  Patient was transfused with  PRBC and received IV iron inpatient.  Last hemoglobin was stable at 9.9 on October 10.  Plan:  Continue ferrous sulfate 325 mg daily  Monitor hemoglobin periodically  Transfuse PRN for hemoglobin less than 7    Trigeminal neuralgia,  Migraine,  Chronic pain syndrome,  Chronic back pain.  Plan:  Continue oxcarbazepine 450 mg at bedtime  Continue topiramate 50 mg at bedtime  Continue nortriptyline 25 mg twice daily  Continue Keppra 750 mg twice daily  Continue lidocaine patch  Continue acetaminophen 650 mg every 6 hours PRN  Continue Tylenol with codeine 300--30 mg, 1 tablet every 6 hours as needed for severe  pain  Follow-up with neurology as directed    Depression with suicidal ideation,  Anxiety.  Medication were adjusted in the hospital per psychiatry.  Symptoms appears to be controlled now.  Plan:  Continue mirtazapine 15 mg at bedtime  Continue Seroquel 25 mg twice daily, 50 mg at bedtime, and PRN 12.5 mg twice daily  Continue gabapentin 100 mg 3 times daily PRN  Monitor symptoms  Refer to ACP PRN    Encephalopathy.  Multifactorial and due to COVID-19 infection, sepsis, and ventriculitis.  Improved.  On today's examination, she is oriented almost x3.  Plan:  Standard delirium precautions  Monitor mental status  Formal cognitive evaluation per OT for safe discharge planning    Malnutrition.  Plan:  Continue nutritional supplement  Facility dietitian is following the patient    Physical deconditioning.  Plan:  Continue PT/OT evaluation and therapy          Orders written by provider at facility:  None.        Disclaimer: This note may contain text created using speech-recognition software and may contain unintended word substitutions.      Electronically signed by:  Odilon Jain MD

## 2022-10-11 ENCOUNTER — TRANSITIONAL CARE UNIT VISIT (OUTPATIENT)
Dept: GERIATRICS | Facility: CLINIC | Age: 77
End: 2022-10-11
Payer: COMMERCIAL

## 2022-10-11 VITALS
RESPIRATION RATE: 18 BRPM | SYSTOLIC BLOOD PRESSURE: 110 MMHG | TEMPERATURE: 98.2 F | HEART RATE: 81 BPM | OXYGEN SATURATION: 94 % | HEIGHT: 65 IN | DIASTOLIC BLOOD PRESSURE: 69 MMHG | WEIGHT: 118 LBS | BODY MASS INDEX: 19.66 KG/M2

## 2022-10-11 DIAGNOSIS — E78.5 DYSLIPIDEMIA: ICD-10-CM

## 2022-10-11 DIAGNOSIS — G43.909 MIGRAINE WITHOUT STATUS MIGRAINOSUS, NOT INTRACTABLE, UNSPECIFIED MIGRAINE TYPE: ICD-10-CM

## 2022-10-11 DIAGNOSIS — R53.81 PHYSICAL DECONDITIONING: ICD-10-CM

## 2022-10-11 DIAGNOSIS — Z86.16 HISTORY OF 2019 NOVEL CORONAVIRUS DISEASE (COVID-19): ICD-10-CM

## 2022-10-11 DIAGNOSIS — R13.10 DYSPHAGIA, UNSPECIFIED TYPE: ICD-10-CM

## 2022-10-11 DIAGNOSIS — Z86.19 HISTORY OF CLOSTRIDIOIDES DIFFICILE COLITIS: ICD-10-CM

## 2022-10-11 DIAGNOSIS — M54.9 CHRONIC BACK PAIN, UNSPECIFIED BACK LOCATION, UNSPECIFIED BACK PAIN LATERALITY: ICD-10-CM

## 2022-10-11 DIAGNOSIS — G89.29 CHRONIC BACK PAIN, UNSPECIFIED BACK LOCATION, UNSPECIFIED BACK PAIN LATERALITY: ICD-10-CM

## 2022-10-11 DIAGNOSIS — A86: Primary | ICD-10-CM

## 2022-10-11 DIAGNOSIS — F41.8 DEPRESSION WITH ANXIETY: ICD-10-CM

## 2022-10-11 DIAGNOSIS — D64.9 ACUTE ANEMIA: ICD-10-CM

## 2022-10-11 DIAGNOSIS — E61.1 IRON DEFICIENCY: ICD-10-CM

## 2022-10-11 DIAGNOSIS — G93.40 ENCEPHALOPATHY: ICD-10-CM

## 2022-10-11 DIAGNOSIS — G89.4 CHRONIC PAIN SYNDROME: ICD-10-CM

## 2022-10-11 DIAGNOSIS — G50.0 TRIGEMINAL NEURALGIA: ICD-10-CM

## 2022-10-11 DIAGNOSIS — E46 MALNUTRITION, UNSPECIFIED TYPE (H): ICD-10-CM

## 2022-10-11 DIAGNOSIS — K21.9 GASTROESOPHAGEAL REFLUX DISEASE, UNSPECIFIED WHETHER ESOPHAGITIS PRESENT: ICD-10-CM

## 2022-10-11 PROCEDURE — 99305 1ST NF CARE MODERATE MDM 35: CPT | Performed by: INTERNAL MEDICINE

## 2022-10-11 NOTE — LETTER
10/11/2022        RE: Julisa Chaney  [address]  [place] [address]        [facility]  INITIAL VISIT NOTE  October 11, 2022    PRIMARY CARE PROVIDER AND CLINIC:  Mara Murillo [address]  / [place] [address]    CHIEF COMPLAINT:  Hospital follow-up/Initial visit    HPI:    Julisa Chaney is a 77 year old  (1945) female who was seen at [place] [facility] on October 11, 2022 for an initial visit.     Medical history is notable for dyslipidemia, dysphagia, aspiration pneumonia, GERD, gastritis, duodenal stricture, chronic anemia, iron deficiency, trigeminal neuralgia, migraine, depression, anxiety, and chronic pain syndrome.    Summary of hospital course:  Patient was originally hospitalized at [facility] from July 19 through July 27, 2022 for acute COVID-19 infection with bacterial superinfection.  She was treated with 5 days of remdesivir and broad-spectrum antibiotics including meropenem and vancomycin.  Brain MRI revealed ventriculitis as well as focal abscess formation in the frontal horn of left lateral ventricle.  She was then transferred to [facility] where she stayed from July 27 through October 4, 2022. Neurosurgery was consulted and left frontal external ventricular drain was placed on July 31, 2022.  Drain was eventually removed on August 8.  Hospital course was complicated by C. difficile colitis, dysphagia, s/p G-tube on August 16, 2022, and worsening depressive symptoms with suicidal thoughts.  Rectal tube was placed and she was treated with vancomycin for C. difficile infection.  Dysphagia improved and G-tube was removed on October 3.  She was transfused with 1 unit of PRBC and infused with IV iron for acute anemia.  She was evaluated by psychiatry and neurology.  She was started on Keppra for anxiety.  Trigeminal neuralgia/pain was managed with Tylenol #3, nortriptyline, oxcarbazepine, and topiramate.     TCU was recommended per therapies.    Patient is admitted to this facility for medical management, nursing care, and rehab.     Of note, history was obtained from patient, facility RN, and extensive review of the chart.    Today's visit:  Patient was seen in her room, while sitting in a wheelchair.  She appears frail.  She feels generally weak and has poor appetite.  She reports no bowel movement for several days.  She denies fever, chills, cough, sputum, chest pain, dyspnea, palpitation, nausea, vomiting, abdominal pain, or urinary symptoms.      CODE STATUS:   CPR/Full code     PAST MEDICAL HISTORY:    Left lateral ventriculitis with ventricular abscess, s/p left frontal ventriculostomy on July 31, 2022; drain was removed on August 8, 2022  COVID-19 infection in July 2022  Bacterial pneumonia in July 2022  Aspiration pneumonia in February 2022  Dyslipidemia  Dysphagia, s/p G-tube from August 16, 2022 through October 3, 2022  GERD  Gastritis  Duodenal stricture  C. difficile colitis in August 2022  Chronic anemia, baseline hemoglobin 9-11  Iron deficiency  Trigeminal neuralgia  Migraine  Depression  Anxiety  Chronic pain syndrome  Chronic back pain    Past Medical History:   Diagnosis Date     Aspiration pneumonia (H) 02/2022     Depression      High cholesterol      Migraines      Pyloric ulcer, chronic      Sepsis (H) 08/10/2020     Trigeminal neuralgia        PAST SURGICAL HISTORY:   Past Surgical History:   Procedure Laterality Date     HYSTERECTOMY       IR GASTROSTOMY TUBE PERCUTANEOUS PLCMNT  8/16/2022     PICC TRIPLE LUMEN PLACEMENT  7/22/2022          MS ESOPHAGOGASTRODUODENOSCOPY TRANSORAL DIAGNOSTIC N/A 8/13/2020    Procedure: ESOPHAGOGASTRODUODENOSCOPY (EGD);  Surgeon: Nathan Smalls MD;  Location: [facility];  Service: Gastroenterology     MS ESOPHAGOGASTRODUODENOSCOPY TRANSORAL DIAGNOSTIC N/A 8/14/2020    Procedure: ESOPHAGOGASTRODUODENOSCOPY (EGD), PYLORIC DILATION;  Surgeon: Nathan Smalls  MD CRISTIANO;  Location: [facility];  Service: Gastroenterology     VENTRICULOSTOMY Left 2022    Procedure: LEFT FRONTAL VENTRICULOSTOMY;  Surgeon: Brady Heredia MD;  Location: [facility] COLONOSCOPY W/WO BRUSH/WASH N/A 2020    Procedure: COLONOSCOPY WITH POLYPECTOMY;  Surgeon: Nathan Smalls MD;  Location: [facility];  Service: Gastroenterology       FAMILY HISTORY:   Family History   Problem Relation Age of Onset     Cancer Father      Testicular cancer Grandchild 17.00     Breast Cancer Mother      Breast Cancer Sister      Breast Cancer Maternal Aunt      Breast Cancer Sister        SOCIAL HISTORY:  Social History     Tobacco Use     Smoking status: Former     Packs/day: 1.00     Years: 50.00     Pack years: 50.00     Types: Cigarettes     Quit date: 2014     Years since quittin.9     Smokeless tobacco: Never   Substance Use Topics     Alcohol use: No       MEDICATIONS:  Current Outpatient Medications   Medication Sig Dispense Refill     acetaminophen (TYLENOL) 325 MG tablet Take 2 tablets (650 mg) by mouth every 6 hours as needed for pain       acetaminophen-codeine (TYLENOL W/CODEINE #3) 300-30 MG per tablet Take 1 tablet by mouth every 6 hours as needed for severe pain 30 tablet 0     cholecalciferol, vitamin D3, 50 mcg (2,000 unit) Tab [CHOLECALCIFEROL, VITAMIN D3, 50 MCG (2,000 UNIT) TAB] Take 1 tablet (2,000 Units total) by mouth daily. One tab daily 90 tablet 4     desonide (DESOWEN) 0.05 % cream [DESONIDE (DESOWEN) 0.05 % CREAM] Apply to affected area 2 times daily (Patient taking differently: Apply topically 2 times daily as needed) 60 g 1     ferrous sulfate 325 (65 FE) MG tablet [FERROUS SULFATE 325 (65 FE) MG TABLET] Take 1 tablet (325 mg total) by mouth daily with breakfast. 30 tablet 0     gabapentin (NEURONTIN) 100 MG capsule Take 1 capsule (100 mg) by mouth 3 times daily as needed for neuropathic pain or other (anxiety or pain)       levETIRAcetam (KEPPRA)  "750 MG tablet Take 1 tablet (750 mg) by mouth 2 times daily       Lidocaine (LIDOCARE) 4 % Patch Place 1 patch onto the skin every 24 hours To prevent lidocaine toxicity, patient should be patch free for 12 hrs daily.  Back pain       mirtazapine (REMERON) 15 MG tablet Take 1 tablet (15 mg) by mouth At Bedtime 30 tablet 1     multivitamin (DAILY-DAVID) per tablet [MULTIVITAMIN (DAILY-DAVID) PER TABLET] Take 1 tablet by mouth daily. 100 tablet 3     nortriptyline (PAMELOR) 25 MG capsule Take 1 capsule (25 mg) by mouth 2 times daily       omeprazole (PRILOSEC) 40 MG DR capsule Take 1 capsule (40 mg) by mouth daily 90 capsule 3     OXcarbazepine (TRILEPTAL) 150 MG tablet TAKE 3 TABLETS(450 MG) BY MOUTH AT BEDTIME 270 tablet 2     polyvinyl alcohol (ARTIFICIAL TEARS) 1.4 % ophthalmic solution Place 1 drop into both eyes every hour as needed for dry eyes 60 mL 0     QUEtiapine (SEROQUEL) 25 MG tablet Take 1 tablet (25 mg) by mouth 2 times daily       QUEtiapine (SEROQUEL) 25 MG tablet Take 0.5 tablets (12.5 mg) by mouth 2 times daily as needed       QUEtiapine (SEROQUEL) 50 MG tablet Take 1 tablet (50 mg) by mouth At Bedtime       senna-docusate (SENOKOT-S/PERICOLACE) 8.6-50 MG tablet Take 1 tablet by mouth At Bedtime       topiramate (TOPAMAX) 50 MG tablet Take 1 tablet (50 mg) by mouth At Bedtime 90 tablet 4       Post Medication Reconciliation Status: Previously reconciled during another visit; continue medications without change.      ALLERGIES:  Allergies   Allergen Reactions     Contrast [Iohexol] Rash     Noticed during 08/2020 admission. Received IV contrast on 8/5     Chocolate Headache     Penicillins Rash       ROS:  10 point ROS were negative other than the symptoms noted above in the HPI.    PHYSICAL EXAM:  Vital signs were reviewed in the chart.  Vital Signs: /69   Pulse 81   Temp 98.2  F (36.8  C)   Resp 18   Ht 1.651 m (5' 5\")   Wt 53.5 kg (118 lb)   SpO2 94%   BMI 19.64 kg/m    General: Frail " appearing but comfortable and in no acute distress  HEENT: Conjunctival pallor, no scleral icterus or injection, moist oral mucosa  Cardiovascular: Normal S1, S2, RRR  Respiratory: Lungs clear to auscultation bilaterally; no crackles, wheezing, or rhonchi  GI: Abdomen soft, non-tender, non-distended, +BS  Extremities: No LE edema  Neuro: CX II-XII grossly intact; ROM in all four extremities grossly intact  Psych: Alert and oriented almost x3; normal affect  Skin: No acute rash    LABORATORY/IMAGING DATA:  All relevant labs and imaging data in [facility] and/or Care Everywhere were personally reviewed today.    CBC (October 10, 2022): White count 9.3, hemoglobin 9.9, platelet count 538,000, MCV 83    Chemistry profile (October 10, 2022): Sodium 138, potassium 3.5, BUN 7, creatinine 0.55, glucose 61, calcium 9.5    Most Recent 3 CBC's:Recent Labs   Lab Test 09/23/22  0837 09/16/22  0846 09/12/22  0540   WBC 7.2 9.0 9.8   HGB 10.5* 10.7* 10.0*   MCV 82 82 82   * 546* 437     Most Recent 3 BMP's:Recent Labs   Lab Test 10/03/22  1006 10/01/22  1159 10/01/22  0757 09/30/22  0836 09/29/22  0832 09/28/22  0855 09/26/22  0800 09/24/22  1612 09/24/22  0745 09/22/22  1449 09/22/22  1255     --   --   --   --   --  136  --  137  --  132*   POTASSIUM 3.9  --   --  4.4 3.7   < > 4.2  --   --   --  4.1  4.1   CHLORIDE 100  --   --   --   --   --  104  --   --   --  101   CO2 25  --   --   --   --   --  24  --   --   --  25   BUN 18  --   --   --   --   --  18  --   --   --  10   CR 0.48*  --   --   --   --   --  0.52  --   --   --  0.43*   ANIONGAP 10  --   --   --   --   --  8  --   --   --  6   ADY 8.8  --   --   --   --   --  8.9  --   --   --  9.1   * 111* 114*  --   --   --  144*   < >  --    < > 105*    < > = values in this interval not displayed.     Most Recent 2 LFT's:Recent Labs   Lab Test 09/22/22  1255 08/09/22  0418   AST 17 6   ALT 24 9   ALKPHOS 144 72   BILITOTAL 0.2 0.4          ASSESSMENT/PLAN:  Left lateral ventriculitis with ventricular abscess, s/p left frontal ventriculostomy on July 31, 2022.  Drain was removed on August 8, 2022.  Cultures negative.  Brain CSF flow cytometry with no evidence of malignancy.  Completed 6 weeks of IV antibiotics on September 8.  Patient is currently afebrile and has no gross neurologic deficits.  Plan:  Follow-up with neurosurgery as directed    Dysphagia, s/p G-tube from August 16, 2022 through October 3, 2022.  Patient is currently on dysphagia diet level 3 with regular consistency.  Plan:  Continue DDL3 with regular consistency  Continue SLP evaluation and therapy    Recent history of COVID-19 infection with superimposed bacterial pneumonia in July 2022.  Completed the course of antibiotic therapy inpatient.  She is now COVID recovered.  She is stable from a respiratory standpoint.  Plan:  Monitor respiratory status    Recent history of C. difficile colitis in August 2022.  Patient required rectal tube; she was treated with vancomycin.  Course of vancomycin is now completed.  She currently has no diarrhea.  Plan:  Monitor for recurrence    Dyslipidemia.  Not medically treated.  Plan:  Follow-up as outpatient    GERD,  History of gastritis,  History of duodenal stricture.  Plan:  Continue omeprazole 40 mg daily    Acute on chronic anemia,   Iron deficiency.  Baseline hemoglobin 9-11.  Sarbjit hemoglobin of 6.9 on August 8.  Patient was transfused with  PRBC and received IV iron inpatient.  Last hemoglobin was stable at 9.9 on October 10.  Plan:  Continue ferrous sulfate 325 mg daily  Monitor hemoglobin periodically  Transfuse PRN for hemoglobin less than 7    Trigeminal neuralgia,  Migraine,  Chronic pain syndrome,  Chronic back pain.  Plan:  Continue oxcarbazepine 450 mg at bedtime  Continue topiramate 50 mg at bedtime  Continue nortriptyline 25 mg twice daily  Continue Keppra 750 mg twice daily  Continue lidocaine patch  Continue acetaminophen  650 mg every 6 hours PRN  Continue Tylenol with codeine 300--30 mg, 1 tablet every 6 hours as needed for severe pain  Follow-up with neurology as directed    Depression with suicidal ideation,  Anxiety.  Medication were adjusted in the hospital per psychiatry.  Symptoms appears to be controlled now.  Plan:  Continue mirtazapine 15 mg at bedtime  Continue Seroquel 25 mg twice daily, 50 mg at bedtime, and PRN 12.5 mg twice daily  Continue gabapentin 100 mg 3 times daily PRN  Monitor symptoms  Refer to ACP PRN    Encephalopathy.  Multifactorial and due to COVID-19 infection, sepsis, and ventriculitis.  Improved.  On today's examination, she is oriented almost x3.  Plan:  Standard delirium precautions  Monitor mental status  Formal cognitive evaluation per OT for safe discharge planning    Malnutrition.  Plan:  Continue nutritional supplement  Facility dietitian is following the patient    Physical deconditioning.  Plan:  Continue PT/OT evaluation and therapy          Orders written by provider at facility:  None.        Disclaimer: This note may contain text created using speech-recognition software and may contain unintended word substitutions.      Electronically signed by:  Odilon Jain MD                          Sincerely,        Odilon Jain MD

## 2022-10-13 ENCOUNTER — TRANSITIONAL CARE UNIT VISIT (OUTPATIENT)
Dept: GERIATRICS | Facility: CLINIC | Age: 77
End: 2022-10-13
Payer: COMMERCIAL

## 2022-10-13 VITALS
DIASTOLIC BLOOD PRESSURE: 71 MMHG | SYSTOLIC BLOOD PRESSURE: 112 MMHG | RESPIRATION RATE: 18 BRPM | TEMPERATURE: 97.7 F | HEART RATE: 95 BPM | HEIGHT: 65 IN | BODY MASS INDEX: 19.13 KG/M2 | WEIGHT: 114.8 LBS | OXYGEN SATURATION: 93 %

## 2022-10-13 DIAGNOSIS — D64.9 ANEMIA, UNSPECIFIED TYPE: ICD-10-CM

## 2022-10-13 DIAGNOSIS — G50.0 TRIGEMINAL NEURALGIA: ICD-10-CM

## 2022-10-13 DIAGNOSIS — M54.9 CHRONIC BACK PAIN, UNSPECIFIED BACK LOCATION, UNSPECIFIED BACK PAIN LATERALITY: ICD-10-CM

## 2022-10-13 DIAGNOSIS — G89.29 CHRONIC BACK PAIN, UNSPECIFIED BACK LOCATION, UNSPECIFIED BACK PAIN LATERALITY: ICD-10-CM

## 2022-10-13 DIAGNOSIS — A41.9 SEPSIS, DUE TO UNSPECIFIED ORGANISM, UNSPECIFIED WHETHER ACUTE ORGAN DYSFUNCTION PRESENT (H): ICD-10-CM

## 2022-10-13 DIAGNOSIS — G06.0 PYOGENIC BRAIN ABSCESS: Primary | ICD-10-CM

## 2022-10-13 DIAGNOSIS — F41.8 DEPRESSION WITH ANXIETY: ICD-10-CM

## 2022-10-13 DIAGNOSIS — E46 MALNUTRITION, UNSPECIFIED TYPE (H): ICD-10-CM

## 2022-10-13 DIAGNOSIS — G43.909 MIGRAINE WITHOUT STATUS MIGRAINOSUS, NOT INTRACTABLE, UNSPECIFIED MIGRAINE TYPE: ICD-10-CM

## 2022-10-13 DIAGNOSIS — K21.00 GASTROESOPHAGEAL REFLUX DISEASE WITH ESOPHAGITIS WITHOUT HEMORRHAGE: ICD-10-CM

## 2022-10-13 DIAGNOSIS — R53.81 PHYSICAL DECONDITIONING: ICD-10-CM

## 2022-10-13 DIAGNOSIS — R13.10 DYSPHAGIA, UNSPECIFIED TYPE: ICD-10-CM

## 2022-10-13 DIAGNOSIS — U07.1 INFECTION DUE TO 2019 NOVEL CORONAVIRUS: ICD-10-CM

## 2022-10-13 DIAGNOSIS — A04.72 C. DIFFICILE COLITIS: ICD-10-CM

## 2022-10-13 PROCEDURE — 99309 SBSQ NF CARE MODERATE MDM 30: CPT | Performed by: NURSE PRACTITIONER

## 2022-10-13 NOTE — PROGRESS NOTES
"[facility]    Chief Complaint   Patient presents with     RECHECK     HPI:  Julisa Chaney is a 77 year old  (1945), who is being seen today for an episodic care visit at: [facility] (TCU) [[id number]].     Per recent TCU provider progress notes:   77 year old female PMH trigeminal neuralgia hospitalized 07/22 for severe sepsis due to COVID-19 infection and bacterial pneumonia, acute and prolonged metabolic encephalopathy. Head CT 7/25/2022 showed a possible left frontal mass causing vasogenic edema. 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 Treated with IV vancomycin, cefepime and metronidazole. Underwent left frontal ventriculostomy on 7/31/2022. Developed C. difficile while on antibiotics requiring rectal tube and a prolonged vanco course. Significant dysphagia and s/p g-tube 8/16. Mental status, dysphagia improving, feeding tube removed 10/3.  pain managed with Tylenol 3. Trigeminal neuralgia: on levetiracetam, nortriptyline, oxcarbazepine, topiramate. C diff: treated with vanco, cholestyramine (now stopped). Anemia: Hgb 11.7 at admission, treated with blood transfusion x 1 for Hgb 6.9 and IV iron, now stable 10 range. Depression/anxiety, suicidal thoughts and agitation, saw psych and now on PRN gabapentin 100 mg TID; quetiapine 25 mg BID and 50 mg at bedtime; PRN quetiapine 12.5 mg BID. Back pain managed with lidocaine patches, tylenol with codeine. GERD: on PPI. Electrolyte imbalance replaced. To TCU for rehab.     Today's concern is: seen for f/u respiratory status, mobility, pain. No new concerns. Weight down 3 lbs since admission. Transfers with CGA. BP range /63-80 and sats 93% room air.     Allergies, and PMH/PSH reviewed in [facility] today.  REVIEW OF SYSTEMS:  Deferred - working with therapy    Objective:   /71   Pulse 95   Temp 97.7  F (36.5  C)   Resp 18   Ht 1.651 m (5' 5\")   Wt 52.1 kg (114 lb 12.8 oz)   SpO2 93% "   BMI 19.10 kg/m    GENERAL APPEARANCE:  Alert, in no distress, cooperative  ENT:  Mouth normal, moist mucous membranes, normal hearing acuity  EYES:  Conjunctiva and lids normal  RESP:  no respiratory distress, on room air  CV: no LE edema  NEURO:   No facial asymmetry, speech clear  PSYCH:  oriented X 3, affect and mood normal     10/9/22 WBC 9.3, HGB 9.9,   Most Recent 3 CBC's:Recent Labs   Lab Test 09/23/22  0837 09/16/22  0846 09/12/22  0540   WBC 7.2 9.0 9.8   HGB 10.5* 10.7* 10.0*   MCV 82 82 82   * 546* 437     10/9/22 Na 138, K 3.5, BUN 7 and Cr 0.55  Most Recent 3 BMP's:Recent Labs   Lab Test 10/03/22  1006 10/01/22  1159 10/01/22  0757 09/30/22  0836 09/29/22  0832 09/28/22  0855 09/26/22  0800 09/24/22  1612 09/24/22  0745 09/22/22  1449 09/22/22  1255     --   --   --   --   --  136  --  137  --  132*   POTASSIUM 3.9  --   --  4.4 3.7   < > 4.2  --   --   --  4.1  4.1   CHLORIDE 100  --   --   --   --   --  104  --   --   --  101   CO2 25  --   --   --   --   --  24  --   --   --  25   BUN 18  --   --   --   --   --  18  --   --   --  10   CR 0.48*  --   --   --   --   --  0.52  --   --   --  0.43*   ANIONGAP 10  --   --   --   --   --  8  --   --   --  6   ADY 8.8  --   --   --   --   --  8.9  --   --   --  9.1   * 111* 114*  --   --   --  144*   < >  --    < > 105*    < > = values in this interval not displayed.       Assessment/Plan:  Left lateral ventriculitis with ventricular abscess, s/p left frontal ventriculostomy  Sepsis  Acute, resolved. Completed 6 weeks of IV antibiotics on September 8. Follow-up with neurosurgery as directed    Dysphagia  Ongoing. Changed to DD2 diet per ST. Monitor.     Recent history of COVID-19 infection with superimposed bacterial pneumonia   Resolved, stable. Monitor respiratory status    Recent history of C. difficile colitis  Resolved. Monitor for recurrence.     GERD  Continue omeprazole 40 mg daily    Acute on chronic anemia  Iron  deficiency  Chronic, stable. Hgb 9.9 on 10/9. Continue ferrous sulfate 325 mg daily. Monitor CBC PRN.     Trigeminal neuralgia  Migraine  Chronic pain syndrome  Chronic back pain  Chronic, stable. Continue oxcarbazepine 450 mg at bedtime, topiramate 50 mg at bedtime, nortriptyline 25 mg twice daily, Keppra 750 mg twice daily, lidocaine patch, acetaminophen 650 mg every 6 hours PRN and Tylenol with codeine 300--30 mg, 1 tablet every 6 hours as needed for severe pain. F/U neurology as recommended.     Depression with suicidal ideation  Anxiety  Chronic, stable. Continue mirtazapine 15 mg at bedtime, Seroquel 25 mg twice daily, 50 mg at bedtime, and PRN 12.5 mg twice daily as well as gabapentin 100 mg 3 times daily PRN. Monitor mood, refer to ACP as needed.     Malnutrition  Supplements per dietician. Monitor weight and intake    Physical deconditioning  Acute, ongoing. Continue PT/OT evaluation and therapy    Post Medication Reconciliation Status: Discharge medications reconciled, continue medications without change    Orders:  No new orders    Electronically signed by: DANIELLE Sanchez CNP

## 2022-10-17 DIAGNOSIS — G50.0 TRIGEMINAL NEURALGIA: ICD-10-CM

## 2022-10-17 DIAGNOSIS — G43.009 MIGRAINE WITHOUT AURA AND WITHOUT STATUS MIGRAINOSUS, NOT INTRACTABLE: ICD-10-CM

## 2022-10-17 RX ORDER — ACETAMINOPHEN AND CODEINE PHOSPHATE 300; 30 MG/1; MG/1
1 TABLET ORAL EVERY 6 HOURS PRN
Qty: 30 TABLET | Refills: 0 | Status: SHIPPED | OUTPATIENT
Start: 2022-10-17 | End: 2022-10-26

## 2022-10-18 ENCOUNTER — PATIENT OUTREACH (OUTPATIENT)
Dept: CARE COORDINATION | Facility: CLINIC | Age: 77
End: 2022-10-18

## 2022-10-18 NOTE — PROGRESS NOTES
S-(situation): RN Clinical Product Navigator monitoring TCU progression.    B-(background): Patient was inpatient at [facility] 7/27/22-10/4/22 due to left lateral ventricular abscess with severe sepsis s/p left frontal ventriculostomy (7/31/22), acute infectious and metabolic encephalopathy (resolved), concern for cognitive impairment with SLUMS 18/30, trigeminal neuralgia, severe dysphagia, severe malnutrition related to acute on chronic medical disease-s/p g-tube placement (8/16/22), C. Diff, acute on chronic anemia, depression/anxiety with suicidal thoughts and agitation, acute on chronic back pain, physical deconditioning, hyperglycemia-suspect stress induced (resolved), hypokalemia, hypophosphatemia (resolved).  Patient was previously at [facility] 7/19/22-7/27/22 for severe sepsis due to COVID-19 and suspected bacterial pneumonia.  Patient deteriorated despite seizure medications and antibiotics and MRI suggested brain abscess; neurosurgery recommended transfer to a higher level of care at [facility].       [facility].    A-(assessment): Patient is receiving skilled therapy services; OT/PT 7 days/week and ST 5 days/week.    Patient had care conference at TCU on 10/13/22.      Barriers to discharge include memory impairment, mobility and fatigue.    Patient may potentially discharge to an senior care.    R-(recommendations/plan): RN Clinical Product Navigator will continue to monitor TCU progression and initiate a primary care - care coordination program within 1 business day of discharge.    Melissa Behl BSN, RN, PHN, [facility]  RN Clinical Product Navigator  [phone]

## 2022-10-19 ENCOUNTER — TRANSITIONAL CARE UNIT VISIT (OUTPATIENT)
Dept: GERIATRICS | Facility: CLINIC | Age: 77
End: 2022-10-19
Payer: COMMERCIAL

## 2022-10-19 VITALS
OXYGEN SATURATION: 100 % | RESPIRATION RATE: 18 BRPM | WEIGHT: 115.6 LBS | BODY MASS INDEX: 19.26 KG/M2 | DIASTOLIC BLOOD PRESSURE: 67 MMHG | SYSTOLIC BLOOD PRESSURE: 102 MMHG | HEART RATE: 101 BPM | HEIGHT: 65 IN | TEMPERATURE: 97.7 F

## 2022-10-19 DIAGNOSIS — E46 MALNUTRITION, UNSPECIFIED TYPE (H): ICD-10-CM

## 2022-10-19 DIAGNOSIS — R53.81 PHYSICAL DECONDITIONING: ICD-10-CM

## 2022-10-19 DIAGNOSIS — U07.1 INFECTION DUE TO 2019 NOVEL CORONAVIRUS: ICD-10-CM

## 2022-10-19 DIAGNOSIS — K21.00 GASTROESOPHAGEAL REFLUX DISEASE WITH ESOPHAGITIS WITHOUT HEMORRHAGE: ICD-10-CM

## 2022-10-19 DIAGNOSIS — G89.29 CHRONIC BACK PAIN, UNSPECIFIED BACK LOCATION, UNSPECIFIED BACK PAIN LATERALITY: ICD-10-CM

## 2022-10-19 DIAGNOSIS — G06.0 PYOGENIC BRAIN ABSCESS: Primary | ICD-10-CM

## 2022-10-19 DIAGNOSIS — A41.9 SEPSIS, DUE TO UNSPECIFIED ORGANISM, UNSPECIFIED WHETHER ACUTE ORGAN DYSFUNCTION PRESENT (H): ICD-10-CM

## 2022-10-19 DIAGNOSIS — A04.72 C. DIFFICILE COLITIS: ICD-10-CM

## 2022-10-19 DIAGNOSIS — D64.9 ANEMIA, UNSPECIFIED TYPE: ICD-10-CM

## 2022-10-19 DIAGNOSIS — G43.909 MIGRAINE WITHOUT STATUS MIGRAINOSUS, NOT INTRACTABLE, UNSPECIFIED MIGRAINE TYPE: ICD-10-CM

## 2022-10-19 DIAGNOSIS — F41.8 DEPRESSION WITH ANXIETY: ICD-10-CM

## 2022-10-19 DIAGNOSIS — M54.9 CHRONIC BACK PAIN, UNSPECIFIED BACK LOCATION, UNSPECIFIED BACK PAIN LATERALITY: ICD-10-CM

## 2022-10-19 PROCEDURE — 99309 SBSQ NF CARE MODERATE MDM 30: CPT | Performed by: NURSE PRACTITIONER

## 2022-10-19 NOTE — PROGRESS NOTES
[facility] GERIATRICS    Chief Complaint   Patient presents with     RECHECK     HPI:  Julisa Chaney is a 77 year old  (1945), who is being seen today for an episodic care visit at:  (TCU) [[id number]].     Per recent TCU provider progress notes:   77 year old female PMH trigeminal neuralgia hospitalized 07/22 for severe sepsis due to COVID-19 infection and bacterial pneumonia, acute and prolonged metabolic encephalopathy. Head CT 7/25/2022 showed a possible left frontal mass causing vasogenic edema. 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 Treated with IV vancomycin, cefepime and metronidazole. Underwent left frontal ventriculostomy on 7/31/2022. Developed C. difficile while on antibiotics requiring rectal tube and a prolonged vanco course. Significant dysphagia and s/p g-tube 8/16. Mental status, dysphagia improving, feeding tube removed 10/3.  pain managed with Tylenol 3. Trigeminal neuralgia: on levetiracetam, nortriptyline, oxcarbazepine, topiramate. C diff: treated with vanco, cholestyramine (now stopped). Anemia: Hgb 11.7 at admission, treated with blood transfusion x 1 for Hgb 6.9 and IV iron, now stable 10 range. Depression/anxiety, suicidal thoughts and agitation, saw psych and now on PRN gabapentin 100 mg TID; quetiapine 25 mg BID and 50 mg at bedtime; PRN quetiapine 12.5 mg BID. Back pain managed with lidocaine patches, tylenol with codeine. GERD: on PPI. Electrolyte imbalance replaced. To TCU for rehab.     Today's concern is: seen for f/u respiratory status, mobility, pain, bowel function. Reports constipated. BP range /56-68 and sats 100% room air. Walks 32 ft with 2WW and CGA. SLUMS 23/30.      Allergies, and PMH/PSH reviewed in [facility] today.    REVIEW OF SYSTEMS:  10 point ROS of systems including Constitutional, Eyes, Respiratory, Cardiovascular, Gastroenterology, Genitourinary, Integumentary, Musculoskeletal,  "Psychiatric were all negative except for pertinent positives noted in my HPI.    Objective:   /67   Pulse 101   Temp 97.7  F (36.5  C)   Resp 18   Ht 1.651 m (5' 5\")   Wt 52.4 kg (115 lb 9.6 oz)   SpO2 100%   BMI 19.24 kg/m    GENERAL APPEARANCE:  Alert, in no distress, cooperative  ENT:  Mouth normal, moist mucous membranes, normal hearing acuity  EYES:  Conjunctiva and lids normal  RESP:  no respiratory distress, on room air and LSC  CV: no LE edema, HRR  Abdomen soft, hypoactive BS  NEURO:   No facial asymmetry, speech clear  PSYCH:  oriented X 3, affect and mood normal     10/9/22 WBC 9.3, HGB 9.9,   Most Recent 3 CBC's:  Recent Labs   Lab Test 09/23/22  0837 09/16/22  0846 09/12/22  0540   WBC 7.2 9.0 9.8   HGB 10.5* 10.7* 10.0*   MCV 82 82 82   * 546* 437     10/9/22 Na 138, K 3.5, BUN 7 and Cr 0.55  Most Recent 3 BMP's:  Recent Labs   Lab Test 10/03/22  1006 10/01/22  1159 10/01/22  0757 09/30/22  0836 09/29/22  0832 09/28/22  0855 09/26/22  0800 09/24/22  1612 09/24/22  0745 09/22/22  1449 09/22/22  1255     --   --   --   --   --  136  --  137  --  132*   POTASSIUM 3.9  --   --  4.4 3.7   < > 4.2  --   --   --  4.1  4.1   CHLORIDE 100  --   --   --   --   --  104  --   --   --  101   CO2 25  --   --   --   --   --  24  --   --   --  25   BUN 18  --   --   --   --   --  18  --   --   --  10   CR 0.48*  --   --   --   --   --  0.52  --   --   --  0.43*   ANIONGAP 10  --   --   --   --   --  8  --   --   --  6   ADY 8.8  --   --   --   --   --  8.9  --   --   --  9.1   * 111* 114*  --   --   --  144*   < >  --    < > 105*    < > = values in this interval not displayed.       Assessment/Plan:  Left lateral ventriculitis with ventricular abscess, s/p left frontal ventriculostomy  Sepsis  Acute, resolved. Completed 6 weeks of IV antibiotics on September 8. Follow-up with neurosurgery as directed    Dysphagia  Ongoing. Changed to DD2 diet per ST. Monitor.     Recent history " of COVID-19 infection with superimposed bacterial pneumonia   Resolved, stable. Monitor respiratory status    Recent history of C. difficile colitis  Constipation  Resolved. Monitor for recurrence. Due to constipation increase senna s to 2 tabs daily.     GERD  Continue omeprazole 40 mg daily    Acute on chronic anemia  Iron deficiency  Chronic, stable. Hgb 9.9 on 10/9. Continue ferrous sulfate 325 mg daily. Monitor CBC PRN.     Trigeminal neuralgia  Migraine  Chronic pain syndrome  Chronic back pain  Chronic, stable. Continue oxcarbazepine 450 mg at bedtime, topiramate 50 mg at bedtime, nortriptyline 25 mg twice daily, Keppra 750 mg twice daily, lidocaine patch, acetaminophen 650 mg every 6 hours PRN and Tylenol with codeine 300--30 mg, 1 tablet every 6 hours as needed for severe pain. F/U neurology as recommended.     Depression with suicidal ideation  Anxiety  Chronic, stable. Continue mirtazapine 15 mg at bedtime, Seroquel 25 mg twice daily, 50 mg at bedtime, and PRN 12.5 mg twice daily as well as gabapentin 100 mg 3 times daily PRN. Monitor mood, refer to ACP as needed.     Malnutrition  Supplements per dietician. Monitor weight and intake    Physical deconditioning  Acute, ongoing. Continue PT/OT evaluation and therapy    Post Medication Reconciliation Status: Discharge medications reconciled and changed, see notes/orders    Orders:  Increase senna s to 2 tabs PO daily diagnosis constipation    Electronically signed by: DANIELLE Sanchez CNP

## 2022-10-26 ENCOUNTER — TRANSITIONAL CARE UNIT VISIT (OUTPATIENT)
Dept: GERIATRICS | Facility: CLINIC | Age: 77
End: 2022-10-26
Payer: COMMERCIAL

## 2022-10-26 VITALS
HEIGHT: 65 IN | RESPIRATION RATE: 18 BRPM | BODY MASS INDEX: 18.93 KG/M2 | TEMPERATURE: 98.2 F | SYSTOLIC BLOOD PRESSURE: 101 MMHG | DIASTOLIC BLOOD PRESSURE: 67 MMHG | OXYGEN SATURATION: 95 % | WEIGHT: 113.6 LBS | HEART RATE: 78 BPM

## 2022-10-26 DIAGNOSIS — D64.9 ANEMIA, UNSPECIFIED TYPE: ICD-10-CM

## 2022-10-26 DIAGNOSIS — G89.29 CHRONIC BACK PAIN, UNSPECIFIED BACK LOCATION, UNSPECIFIED BACK PAIN LATERALITY: ICD-10-CM

## 2022-10-26 DIAGNOSIS — K21.00 GASTROESOPHAGEAL REFLUX DISEASE WITH ESOPHAGITIS WITHOUT HEMORRHAGE: ICD-10-CM

## 2022-10-26 DIAGNOSIS — A04.72 C. DIFFICILE COLITIS: ICD-10-CM

## 2022-10-26 DIAGNOSIS — R53.81 PHYSICAL DECONDITIONING: ICD-10-CM

## 2022-10-26 DIAGNOSIS — E46 MALNUTRITION, UNSPECIFIED TYPE (H): ICD-10-CM

## 2022-10-26 DIAGNOSIS — G50.0 TRIGEMINAL NEURALGIA: ICD-10-CM

## 2022-10-26 DIAGNOSIS — G43.009 MIGRAINE WITHOUT AURA AND WITHOUT STATUS MIGRAINOSUS, NOT INTRACTABLE: ICD-10-CM

## 2022-10-26 DIAGNOSIS — F41.8 DEPRESSION WITH ANXIETY: ICD-10-CM

## 2022-10-26 DIAGNOSIS — G43.909 MIGRAINE WITHOUT STATUS MIGRAINOSUS, NOT INTRACTABLE, UNSPECIFIED MIGRAINE TYPE: ICD-10-CM

## 2022-10-26 DIAGNOSIS — A41.9 SEPSIS, DUE TO UNSPECIFIED ORGANISM, UNSPECIFIED WHETHER ACUTE ORGAN DYSFUNCTION PRESENT (H): ICD-10-CM

## 2022-10-26 DIAGNOSIS — R42 DIZZINESS: ICD-10-CM

## 2022-10-26 DIAGNOSIS — G06.0 PYOGENIC BRAIN ABSCESS: Primary | ICD-10-CM

## 2022-10-26 DIAGNOSIS — U07.1 INFECTION DUE TO 2019 NOVEL CORONAVIRUS: ICD-10-CM

## 2022-10-26 DIAGNOSIS — M54.9 CHRONIC BACK PAIN, UNSPECIFIED BACK LOCATION, UNSPECIFIED BACK PAIN LATERALITY: ICD-10-CM

## 2022-10-26 PROCEDURE — 99310 SBSQ NF CARE HIGH MDM 45: CPT | Performed by: NURSE PRACTITIONER

## 2022-10-26 RX ORDER — MECLIZINE HCL 12.5 MG 12.5 MG/1
12.5 TABLET ORAL 2 TIMES DAILY
COMMUNITY
End: 2022-11-08

## 2022-10-26 RX ORDER — ACETAMINOPHEN AND CODEINE PHOSPHATE 300; 30 MG/1; MG/1
1 TABLET ORAL EVERY 6 HOURS PRN
Qty: 30 TABLET | Refills: 0 | Status: SHIPPED | OUTPATIENT
Start: 2022-10-26 | End: 2022-11-04

## 2022-10-26 NOTE — PROGRESS NOTES
[facility] GERIATRICS    Chief Complaint   Patient presents with     RECHECK     HPI:  Julisa hCaney is a 77 year old  (1945), who is being seen today for an episodic care visit at: [facility] (TCU) [[id number]].     Per recent TCU provider progress notes:   77 year old female PMH trigeminal neuralgia hospitalized 07/22 for severe sepsis due to COVID-19 infection and bacterial pneumonia, acute and prolonged metabolic encephalopathy. Head CT 7/25/2022 showed a possible left frontal mass causing vasogenic edema. 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 Treated with IV vancomycin, cefepime and metronidazole. Underwent left frontal ventriculostomy on 7/31/2022. Developed C. difficile while on antibiotics requiring rectal tube and a prolonged vanco course. Significant dysphagia and s/p g-tube 8/16. Mental status, dysphagia improving, feeding tube removed 10/3.  pain managed with Tylenol 3. Trigeminal neuralgia: on levetiracetam, nortriptyline, oxcarbazepine, topiramate. C diff: treated with vanco, cholestyramine (now stopped). Anemia: Hgb 11.7 at admission, treated with blood transfusion x 1 for Hgb 6.9 and IV iron, now stable 10 range. Depression/anxiety, suicidal thoughts and agitation, saw psych and now on PRN gabapentin 100 mg TID; quetiapine 25 mg BID and 50 mg at bedtime; PRN quetiapine 12.5 mg BID. Back pain managed with lidocaine patches, tylenol with codeine. GERD: on PPI. Electrolyte imbalance replaced. To TCU for rehab.     Today's concern is: seen for f/u dizziness, mobility, pain, bowel function. Reports still constipated. Dizzy when up in therapies, discussed trying meclizine. BP range /56-75 and sats 95% room air. Transfers with CGA. SLUMS 23/30. Weight down 6 lbs since admission.     Allergies, and PMH/PSH reviewed in [facility] today.    REVIEW OF SYSTEMS:  10 point ROS of systems including Constitutional, Eyes, Respiratory,  "Cardiovascular, Gastroenterology, Genitourinary, Integumentary, Musculoskeletal, Psychiatric were all negative except for pertinent positives noted in my HPI.    Objective:   /67   Pulse 78   Temp 98.2  F (36.8  C)   Resp 18   Ht 1.651 m (5' 5\")   Wt 51.5 kg (113 lb 9.6 oz)   SpO2 95%   BMI 18.90 kg/m    GENERAL APPEARANCE:  Alert, in no distress, cooperative  ENT:  Mouth normal, moist mucous membranes, normal hearing acuity  EYES:  Conjunctiva and lids normal  RESP:  no respiratory distress, on room air and LSC  CV: no LE edema, HRR  Abdomen soft, hypoactive BS  NEURO:   No facial asymmetry, speech clear  PSYCH:  oriented X 3, affect and mood normal     10/9/22 WBC 9.3, HGB 9.9,   Most Recent 3 CBC's:  Recent Labs   Lab Test 09/23/22  0837 09/16/22  0846 09/12/22  0540   WBC 7.2 9.0 9.8   HGB 10.5* 10.7* 10.0*   MCV 82 82 82   * 546* 437     10/9/22 Na 138, K 3.5, BUN 7 and Cr 0.55  Most Recent 3 BMP's:  Recent Labs   Lab Test 10/03/22  1006 10/01/22  1159 10/01/22  0757 09/30/22  0836 09/29/22  0832 09/28/22  0855 09/26/22  0800 09/24/22  1612 09/24/22  0745 09/22/22  1449 09/22/22  1255     --   --   --   --   --  136  --  137  --  132*   POTASSIUM 3.9  --   --  4.4 3.7   < > 4.2  --   --   --  4.1  4.1   CHLORIDE 100  --   --   --   --   --  104  --   --   --  101   CO2 25  --   --   --   --   --  24  --   --   --  25   BUN 18  --   --   --   --   --  18  --   --   --  10   CR 0.48*  --   --   --   --   --  0.52  --   --   --  0.43*   ANIONGAP 10  --   --   --   --   --  8  --   --   --  6   ADY 8.8  --   --   --   --   --  8.9  --   --   --  9.1   * 111* 114*  --   --   --  144*   < >  --    < > 105*    < > = values in this interval not displayed.       Assessment/Plan:  Left lateral ventriculitis with ventricular abscess, s/p left frontal ventriculostomy  Sepsis  Acute, resolved. Completed 6 weeks of IV antibiotics on September 8. Follow-up with neurosurgery as " directed    Dysphagia  Ongoing. Changed to DD2 diet per ST. Monitor.     Recent history of COVID-19 infection with superimposed bacterial pneumonia   Resolved, stable. Monitor respiratory status    Recent history of C. difficile colitis  Constipation  Resolved. Monitor for recurrence. Due to constipation increase senna s to 2 tabs twice daily.     GERD  Continue omeprazole 40 mg daily    Acute on chronic anemia  Iron deficiency  Chronic, stable. Hgb 9.9 on 10/9. Continue ferrous sulfate 325 mg daily. Monitor CBC PRN.     Trigeminal neuralgia  Migraine  Chronic pain syndrome  Chronic back pain  Chronic, stable. Continue oxcarbazepine 450 mg at bedtime, topiramate 50 mg at bedtime, nortriptyline 25 mg twice daily, Keppra 750 mg twice daily, lidocaine patch, acetaminophen 650 mg every 6 hours PRN and Tylenol with codeine 300--30 mg, 1 tablet every 6 hours as needed for severe pain. F/U neurology as recommended.     Depression with suicidal ideation  Anxiety  Chronic, stable. Continue mirtazapine 15 mg at bedtime, Seroquel 25 mg twice daily, 50 mg at bedtime, and PRN 12.5 mg twice daily as well as gabapentin 100 mg 3 times daily PRN. Monitor mood, refer to ACP as needed.     Malnutrition  Supplements per dietician. Monitor weight and intake    Physical deconditioning  Acute, ongoing. Continue PT/OT evaluation and therapy    Dizziness  Newly reported. Start Meclizine. Staff to update provider if not effective.     Post Medication Reconciliation Status: Discharge medications reconciled and changed, see notes/orders    Orders:  Increase senna s to 2 tabs PO twice daily diagnosis constipation  Add meclizine 12.5 mg PO BID diagnosis dizziness     Electronically signed by: DANIELLE Sanchez CNP

## 2022-11-01 ENCOUNTER — PATIENT OUTREACH (OUTPATIENT)
Dept: CARE COORDINATION | Facility: CLINIC | Age: 77
End: 2022-11-01

## 2022-11-01 NOTE — PROGRESS NOTES
S-(situation): RN Clinical Product Navigator monitoring TCU progression.     B-(background): Patient was inpatient at [facility] 7/27/22-10/4/22 due to left lateral ventricular abscess with severe sepsis s/p left frontal ventriculostomy (7/31/22), acute infectious and metabolic encephalopathy (resolved), concern for cognitive impairment with SLUMS 18/30, trigeminal neuralgia, severe dysphagia, severe malnutrition related to acute on chronic medical disease-s/p g-tube placement (8/16/22), C. Diff, acute on chronic anemia, depression/anxiety with suicidal thoughts and agitation, acute on chronic back pain, physical deconditioning, hyperglycemia-suspect stress induced (resolved), hypokalemia, hypophosphatemia (resolved).  Patient was previously at [facility] 7/19/22-7/27/22 for severe sepsis due to COVID-19 and suspected bacterial pneumonia.  Patient deteriorated despite seizure medications and antibiotics and MRI suggested brain abscess; neurosurgery recommended transfer to a higher level of care at [facility].       [facility].     A-(assessment): Patient is receiving skilled therapy services; OT/PT 7 days/week and ST 5 days/week.  Patient has been experiencing newly reported dizziness and has started meclizine.    No discharge plans at this time.     R-(recommendations/plan): RN Clinical Product Navigator will continue to monitor TCU progression and initiate a primary care - care coordination program within 1 business day of discharge.     Melissa Behl BSN, RN, PHN, CCM  RN Clinical Product Navigator  [phone]

## 2022-11-02 VITALS
DIASTOLIC BLOOD PRESSURE: 69 MMHG | HEIGHT: 65 IN | OXYGEN SATURATION: 95 % | TEMPERATURE: 98.6 F | SYSTOLIC BLOOD PRESSURE: 104 MMHG | RESPIRATION RATE: 18 BRPM | WEIGHT: 110.8 LBS | HEART RATE: 73 BPM | BODY MASS INDEX: 18.46 KG/M2

## 2022-11-03 ENCOUNTER — TRANSITIONAL CARE UNIT VISIT (OUTPATIENT)
Dept: GERIATRICS | Facility: CLINIC | Age: 77
End: 2022-11-03
Payer: COMMERCIAL

## 2022-11-03 DIAGNOSIS — E61.1 IRON DEFICIENCY: ICD-10-CM

## 2022-11-03 DIAGNOSIS — Z86.16 HISTORY OF 2019 NOVEL CORONAVIRUS DISEASE (COVID-19): ICD-10-CM

## 2022-11-03 DIAGNOSIS — F41.8 DEPRESSION WITH ANXIETY: ICD-10-CM

## 2022-11-03 DIAGNOSIS — A41.9 SEPSIS, DUE TO UNSPECIFIED ORGANISM, UNSPECIFIED WHETHER ACUTE ORGAN DYSFUNCTION PRESENT (H): ICD-10-CM

## 2022-11-03 DIAGNOSIS — D64.9 ANEMIA, UNSPECIFIED TYPE: ICD-10-CM

## 2022-11-03 DIAGNOSIS — Z86.19 HISTORY OF CLOSTRIDIOIDES DIFFICILE COLITIS: ICD-10-CM

## 2022-11-03 DIAGNOSIS — G43.009 MIGRAINE WITHOUT AURA AND WITHOUT STATUS MIGRAINOSUS, NOT INTRACTABLE: ICD-10-CM

## 2022-11-03 DIAGNOSIS — G89.29 CHRONIC BACK PAIN, UNSPECIFIED BACK LOCATION, UNSPECIFIED BACK PAIN LATERALITY: ICD-10-CM

## 2022-11-03 DIAGNOSIS — G50.0 TRIGEMINAL NEURALGIA: ICD-10-CM

## 2022-11-03 DIAGNOSIS — R13.10 DYSPHAGIA, UNSPECIFIED TYPE: ICD-10-CM

## 2022-11-03 DIAGNOSIS — K21.00 GASTROESOPHAGEAL REFLUX DISEASE WITH ESOPHAGITIS WITHOUT HEMORRHAGE: ICD-10-CM

## 2022-11-03 DIAGNOSIS — R53.81 PHYSICAL DECONDITIONING: ICD-10-CM

## 2022-11-03 DIAGNOSIS — K59.01 SLOW TRANSIT CONSTIPATION: ICD-10-CM

## 2022-11-03 DIAGNOSIS — M54.9 CHRONIC BACK PAIN, UNSPECIFIED BACK LOCATION, UNSPECIFIED BACK PAIN LATERALITY: ICD-10-CM

## 2022-11-03 DIAGNOSIS — G06.0 PYOGENIC BRAIN ABSCESS: Primary | ICD-10-CM

## 2022-11-03 DIAGNOSIS — E46 MALNUTRITION, UNSPECIFIED TYPE (H): ICD-10-CM

## 2022-11-03 DIAGNOSIS — R42 DIZZINESS: ICD-10-CM

## 2022-11-03 PROCEDURE — 99316 NF DSCHRG MGMT 30 MIN+: CPT | Performed by: NURSE PRACTITIONER

## 2022-11-03 NOTE — PROGRESS NOTES
[facility] GERIATRICS DISCHARGE SUMMARY  PATIENT'S NAME: Julisa Chaney  YOB: 1945  MEDICAL RECORD NUMBER:  [id number]  Place of Service where encounter took place:  [facility] (TCU) [[id number]]    PRIMARY CARE PROVIDER AND CLINIC RESPONSIBLE AFTER TRANSFER:   Mara Murillo MD, [address] [facility] [address]    Non-[facility] Provider     Transferring providers: DANIELLE Sanchez CNP, Dr Susy MD  Recent Hospitalization/ED:  [facility] Hospital stay 7/27/22 to 10/4/22.  Date of SNF Admission: 10/4/22  Date of SNF (anticipated) Discharge: 11/7/22  Discharged to: previous independent home  Cognitive Scores: SLUMS: 23/30  Physical Function: transfers with CGA  DME: Wheelchair    CODE STATUS/ADVANCE DIRECTIVES DISCUSSION:  Prior   ALLERGIES: Contrast [iohexol], Chocolate, and Penicillins    NURSING FACILITY COURSE   Medication Changes/Rationale:     Increased senna s due to constipation    Added PRN meclizine for dizziness    Per recent TCU provider progress notes:   77 year old female PMH trigeminal neuralgia hospitalized 07/22 for severe sepsis due to COVID-19 infection and bacterial pneumonia, acute and prolonged metabolic encephalopathy. Head CT 7/25/2022 showed a possible left frontal mass causing vasogenic edema. 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 Treated with IV vancomycin, cefepime and metronidazole. Underwent left frontal ventriculostomy on 7/31/2022. Developed C. difficile while on antibiotics requiring rectal tube and a prolonged vanco course. Significant dysphagia and s/p g-tube 8/16. Mental status, dysphagia improving, feeding tube removed 10/3.  pain managed with Tylenol 3. Trigeminal neuralgia: on levetiracetam, nortriptyline, oxcarbazepine, topiramate. C diff: treated with vanco, cholestyramine (now stopped). Anemia: Hgb 11.7 at admission, treated with blood transfusion x 1 for Hgb 6.9 and  IV iron, now stable 10 range. Depression/anxiety, suicidal thoughts and agitation, saw psych and now on PRN gabapentin 100 mg TID; quetiapine 25 mg BID and 50 mg at bedtime; PRN quetiapine 12.5 mg BID. Back pain managed with lidocaine patches, tylenol with codeine. GERD: on PPI. Electrolyte imbalance replaced. To TCU for rehab.     Patient seen for discharge visit. No new concerns. Wt down 6 lbs since admission. BP range /66-74 and sats 93% room air. Home with home care as ordered below.     Summary of nursing facility stay:   Left lateral ventriculitis with ventricular abscess, s/p left frontal ventriculostomy  Sepsis  Acute, resolved. Completed 6 weeks of IV antibiotics on September 8. Follow-up with neurosurgery as directed    Dysphagia  Ongoing. Changed to DD2 diet per ST. Monitor.     Recent history of COVID-19 infection with superimposed bacterial pneumonia   Resolved, stable. Monitor respiratory status    Recent history of C. difficile colitis  Constipation  Resolved. Monitor for recurrence. Due to constipation increased senna s to 2 tabs twice daily, effective/     GERD  Continue omeprazole 40 mg daily    Acute on chronic anemia  Iron deficiency  Chronic, stable. Hgb 9.9 on 10/9. Continue ferrous sulfate 325 mg daily. Monitor CBC PRN.     Trigeminal neuralgia  Migraine  Chronic pain syndrome  Chronic back pain  Chronic, stable. Continue oxcarbazepine 450 mg at bedtime, topiramate 50 mg at bedtime, nortriptyline 25 mg twice daily, Keppra 750 mg twice daily, lidocaine patch, acetaminophen 650 mg every 6 hours PRN and Tylenol with codeine 300--30 mg, 1 tablet every 6 hours as needed for severe pain. F/U neurology as recommended.     Depression with suicidal ideation  Anxiety  Chronic, stable. Continue mirtazapine 15 mg at bedtime, Seroquel 25 mg twice daily, 50 mg at bedtime, and PRN 12.5 mg twice daily as well as gabapentin 100 mg 3 times daily PRN. Monitor mood.    Malnutrition  Supplements per  dietician. Monitor weight and intake    Physical deconditioning  Acute, ongoing. Continue PT/OT evaluation and therapy    Dizziness  Newly reported. Started Meclizine PRN.     Discharge Medications:  MED REC REQUIRED  Post Medication Reconciliation Status:  Discharge medications reconciled, continue medications without change    Current Outpatient Medications   Medication Sig Dispense Refill     acetaminophen (TYLENOL) 325 MG tablet Take 2 tablets (650 mg) by mouth every 6 hours as needed for pain       acetaminophen-codeine (TYLENOL W/CODEINE #3) 300-30 MG per tablet Take 1 tablet by mouth every 6 hours as needed for severe pain 30 tablet 0     cholecalciferol, vitamin D3, 50 mcg (2,000 unit) Tab [CHOLECALCIFEROL, VITAMIN D3, 50 MCG (2,000 UNIT) TAB] Take 1 tablet (2,000 Units total) by mouth daily. One tab daily 90 tablet 4     desonide (DESOWEN) 0.05 % cream [DESONIDE (DESOWEN) 0.05 % CREAM] Apply to affected area 2 times daily (Patient taking differently: Apply topically 2 times daily as needed) 60 g 1     ferrous sulfate 325 (65 FE) MG tablet [FERROUS SULFATE 325 (65 FE) MG TABLET] Take 1 tablet (325 mg total) by mouth daily with breakfast. 30 tablet 0     gabapentin (NEURONTIN) 100 MG capsule Take 1 capsule (100 mg) by mouth 3 times daily as needed for neuropathic pain or other (anxiety or pain)       levETIRAcetam (KEPPRA) 750 MG tablet Take 1 tablet (750 mg) by mouth 2 times daily       Lidocaine (LIDOCARE) 4 % Patch Place 1 patch onto the skin every 24 hours To prevent lidocaine toxicity, patient should be patch free for 12 hrs daily.  Back pain       meclizine (ANTIVERT) 12.5 MG tablet Take 12.5 mg by mouth 2 times daily       mirtazapine (REMERON) 15 MG tablet Take 1 tablet (15 mg) by mouth At Bedtime 30 tablet 1     multivitamin (DAILY-DAVID) per tablet [MULTIVITAMIN (DAILY-DAVID) PER TABLET] Take 1 tablet by mouth daily. 100 tablet 3     nortriptyline (PAMELOR) 25 MG capsule Take 1 capsule (25 mg) by  "mouth 2 times daily       omeprazole (PRILOSEC) 40 MG DR capsule Take 1 capsule (40 mg) by mouth daily 90 capsule 3     OXcarbazepine (TRILEPTAL) 150 MG tablet TAKE 3 TABLETS(450 MG) BY MOUTH AT BEDTIME 270 tablet 2     polyvinyl alcohol (ARTIFICIAL TEARS) 1.4 % ophthalmic solution Place 1 drop into both eyes every hour as needed for dry eyes 60 mL 0     QUEtiapine (SEROQUEL) 25 MG tablet Take 1 tablet (25 mg) by mouth 2 times daily       QUEtiapine (SEROQUEL) 25 MG tablet Take 0.5 tablets (12.5 mg) by mouth 2 times daily as needed       QUEtiapine (SEROQUEL) 50 MG tablet Take 1 tablet (50 mg) by mouth At Bedtime       senna-docusate (SENOKOT-S/PERICOLACE) 8.6-50 MG tablet Take 1 tablet by mouth At Bedtime (Patient taking differently: Take 2 tablets by mouth 2 times daily)       topiramate (TOPAMAX) 50 MG tablet Take 1 tablet (50 mg) by mouth At Bedtime 90 tablet 4        Controlled medications:   Tylenol #3 sent #10 tabs no refills to pharmacy     Past Medical History:   Past Medical History:   Diagnosis Date     Aspiration pneumonia (H) 02/2022     Depression      High cholesterol      Migraines      Pyloric ulcer, chronic      Sepsis (H) 08/10/2020     Trigeminal neuralgia      Physical Exam:   Vitals: /69   Pulse 73   Temp 98.6  F (37  C)   Resp 18   Ht 1.651 m (5' 5\")   Wt 50.3 kg (110 lb 12.8 oz)   SpO2 95%   BMI 18.44 kg/m    BMI: Body mass index is 18.44 kg/m .  GENERAL APPEARANCE:  Alert, in no distress, cooperative  ENT:  Mouth normal, moist mucous membranes, normal hearing acuity  EYES:  Conjunctiva and lids normal  RESP:  no respiratory distress, on room air and LSC  CV: no LE edema, HRR  Abdomen soft, hypoactive BS  NEURO:   No facial asymmetry, speech clear  PSYCH:  oriented X 3, affect and mood normal     SNF labs:   10/9/22 WBC 9.3, HGB 9.9,   Most Recent 3 CBC's:Recent Labs   Lab Test 09/23/22  0837 09/16/22  0846 09/12/22  0540   WBC 7.2 9.0 9.8   HGB 10.5* 10.7* 10.0*   MCV 82 " 82 82   * 546* 437     10/9/22 Na 138, K 3.5, BUN 7 and Cr 0.55  Most Recent 3 BMP's:Recent Labs   Lab Test 10/03/22  1006 10/01/22  1159 10/01/22  0757 09/30/22  0836 09/29/22  0832 09/28/22  0855 09/26/22  0800 09/24/22  1612 09/24/22  0745 09/22/22  1449 09/22/22  1255     --   --   --   --   --  136  --  137  --  132*   POTASSIUM 3.9  --   --  4.4 3.7   < > 4.2  --   --   --  4.1  4.1   CHLORIDE 100  --   --   --   --   --  104  --   --   --  101   CO2 25  --   --   --   --   --  24  --   --   --  25   BUN 18  --   --   --   --   --  18  --   --   --  10   CR 0.48*  --   --   --   --   --  0.52  --   --   --  0.43*   ANIONGAP 10  --   --   --   --   --  8  --   --   --  6   ADY 8.8  --   --   --   --   --  8.9  --   --   --  9.1   * 111* 114*  --   --   --  144*   < >  --    < > 105*    < > = values in this interval not displayed.       DISCHARGE PLAN:    Follow up labs: No labs orders/due    Medical Follow Up:      Follow up with primary care provider in 2-3 weeks  Follow up with specialist neurosurgery, neurology as recommended     Current [facility] scheduled appointments:  Next 5 appointments (look out 90 days)    Nov 09, 2022  1:20 PM  Return Visit with Brady Heredia MD  [facility] ([facility] ) [address] [address] [address] [phone]           Discharge Services: Home Care:  Occupational Therapy, Physical Therapy, Registered Nurse, Home Health Aide and From:  [facility]    Discharge Instructions Verbalized to Patient at Discharge:     None    TOTAL DISCHARGE TIME:   Greater than 30 minutes  Electronically signed by:  DANIELLE Sanchez CNP     Home care Face to Face documentation done in EPIC attached to Home care orders for [facility].         MEDICAL NECESSITY STATEMENT FOR DME    Demographic Information on Julisa Chaney:    Julisa Chaney  Gender:  "female  : [address]  [place] [address]  [phone] (home)     Medical Record: [id number]  Social Security Number: [id number]  Primary Care Provider: Mara Murillo  Insurance: Payor: [facility] / Plan: [facility] MEDICARE ADVANTAGE / Product Type: Medicare /     1. Pyogenic brain abscess    2. Sepsis, due to unspecified organism, unspecified whether acute organ dysfunction present (H)    3. Dysphagia, unspecified type    4. History of 2019 novel coronavirus disease (COVID-19)    5. History of Clostridioides difficile colitis    6. Slow transit constipation    7. Gastroesophageal reflux disease with esophagitis without hemorrhage    8. Anemia, unspecified type    9. Iron deficiency    10. Trigeminal neuralgia    11. Migraine without aura and without status migrainosus, not intractable    12. Chronic back pain, unspecified back location, unspecified back pain laterality    13. Depression with anxiety    14. Malnutrition, unspecified type (H)    15. Physical deconditioning    16. Dizziness         DME:  WHEELCHAIR:  OT recommending 18\" width by 18\" length wheelchair with bilateral standard footrests to assist with increasing overall blood flow in BLE. Wheelchair also necessitates standard seat cushion to decrease risk for pressure sores and B removable arm rests. Pt would benefit from wheelchair to assist with ADLs such as dressing, toileting, and hygiene/grooming as well as mobility within her home to decrease risk of falls and increase pt's overall participation. Pt would not be able to ambulate any distance with 2WW/cane secondary to orthostatic hypotension and increased dizziness, balance deficits, and weakness that make pt unable and unsafe to ambulate with device as she is at a high risk for falls which would impact her independence and safety with completion of self-cares.     Dose patient use oxygen NO   Able to propel w/c YES   Mobility related ADL that are affected in the home dressing, " "toileting, grooming   The wheelchair is suitable and necessary for use in the patient's home and the Home/rooms can accommodate the wheelchair.     Reason why a cane or walker will not meet the patient's needs: fall risk, pain   The patient has expressed willingness to use the wheelchair in the home and Does have the physical and cognitive ability to maneuver the equipment or has a    Caregiver who is available, willing, and able to provide assistance in the home With the wheelchair.     VITAL SIGNS:  Vitals: /69   Pulse 73   Temp 98.6  F (37  C)   Resp 18   Ht 1.651 m (5' 5\")   Wt 50.3 kg (110 lb 12.8 oz)   SpO2 95%   BMI 18.44 kg/m    BMI= Body mass index is 18.44 kg/m .   see above note for exam    MEDICAL NECESSITY STATEMENT  99 months length of need     Pyogenic brain abscess  Sepsis, due to unspecified organism, unspecified whether acute organ dysfunction present (H)  Dysphagia, unspecified type  History of 2019 novel coronavirus disease (COVID-19)  History of Clostridioides difficile colitis  Slow transit constipation  Gastroesophageal reflux disease with esophagitis without hemorrhage  Anemia, unspecified type  Iron deficiency  Trigeminal neuralgia  Migraine without aura and without status migrainosus, not intractable  Chronic back pain, unspecified back location, unspecified back pain laterality  Depression with anxiety  Malnutrition, unspecified type (H)  Physical deconditioning  Dizziness     ELECTRONICALLY SIGNED BY TAVON CERTIFIED PROVIDER:  DANIELLE Sanchez CNP   NPI [id number]   [facility]  [address] [place] [address], [address]  [place] [address]            "

## 2022-11-04 DIAGNOSIS — G50.0 TRIGEMINAL NEURALGIA: ICD-10-CM

## 2022-11-04 DIAGNOSIS — G43.009 MIGRAINE WITHOUT AURA AND WITHOUT STATUS MIGRAINOSUS, NOT INTRACTABLE: ICD-10-CM

## 2022-11-04 RX ORDER — ACETAMINOPHEN AND CODEINE PHOSPHATE 300; 30 MG/1; MG/1
1 TABLET ORAL EVERY 6 HOURS PRN
Qty: 10 TABLET | Refills: 0 | Status: SHIPPED | OUTPATIENT
Start: 2022-11-04 | End: 2022-11-09

## 2022-11-08 ENCOUNTER — TELEPHONE (OUTPATIENT)
Dept: FAMILY MEDICINE | Facility: CLINIC | Age: 77
End: 2022-11-08

## 2022-11-08 ENCOUNTER — MEDICAL CORRESPONDENCE (OUTPATIENT)
Dept: HEALTH INFORMATION MANAGEMENT | Facility: CLINIC | Age: 77
End: 2022-11-08

## 2022-11-08 ENCOUNTER — PATIENT OUTREACH (OUTPATIENT)
Dept: CARE COORDINATION | Facility: CLINIC | Age: 77
End: 2022-11-08

## 2022-11-08 DIAGNOSIS — G04.90 ENCEPHALITIS: ICD-10-CM

## 2022-11-08 DIAGNOSIS — R42 DIZZINESS: Primary | ICD-10-CM

## 2022-11-08 DIAGNOSIS — F41.9 ANXIETY: ICD-10-CM

## 2022-11-08 DIAGNOSIS — R27.8 ASTERIXIS: ICD-10-CM

## 2022-11-08 DIAGNOSIS — G50.0 TRIGEMINAL NEURALGIA: Primary | ICD-10-CM

## 2022-11-08 DIAGNOSIS — G06.0 CEREBRAL ABSCESS: ICD-10-CM

## 2022-11-08 DIAGNOSIS — A86: ICD-10-CM

## 2022-11-08 DIAGNOSIS — G43.909 MIGRAINE HEADACHE: ICD-10-CM

## 2022-11-08 DIAGNOSIS — G50.0 TRIGEMINAL NEURALGIA: ICD-10-CM

## 2022-11-08 DIAGNOSIS — G43.009 MIGRAINE WITHOUT AURA AND WITHOUT STATUS MIGRAINOSUS, NOT INTRACTABLE: ICD-10-CM

## 2022-11-08 DIAGNOSIS — G47.00 PERSISTENT INSOMNIA: ICD-10-CM

## 2022-11-08 DIAGNOSIS — A41.9 SEPSIS (H): ICD-10-CM

## 2022-11-08 DIAGNOSIS — A41.9 SEPSIS, DUE TO UNSPECIFIED ORGANISM, UNSPECIFIED WHETHER ACUTE ORGAN DYSFUNCTION PRESENT (H): ICD-10-CM

## 2022-11-08 DIAGNOSIS — K25.7 PYLORIC ULCER, CHRONIC: ICD-10-CM

## 2022-11-08 DIAGNOSIS — G89.4 CHRONIC PAIN SYNDROME: ICD-10-CM

## 2022-11-08 DIAGNOSIS — R27.8 ASTERIXIS: Primary | ICD-10-CM

## 2022-11-08 RX ORDER — MECLIZINE HCL 12.5 MG 12.5 MG/1
12.5 TABLET ORAL 2 TIMES DAILY PRN
Qty: 60 TABLET | Refills: 0 | Status: SHIPPED | OUTPATIENT
Start: 2022-11-08 | End: 2022-12-01

## 2022-11-08 RX ORDER — GABAPENTIN 100 MG/1
100 CAPSULE ORAL 3 TIMES DAILY PRN
Qty: 90 CAPSULE | Refills: 0 | Status: SHIPPED | OUTPATIENT
Start: 2022-11-08 | End: 2023-02-01

## 2022-11-08 RX ORDER — QUETIAPINE FUMARATE 50 MG/1
50 TABLET, FILM COATED ORAL AT BEDTIME
Qty: 30 TABLET | Refills: 0 | Status: SHIPPED | OUTPATIENT
Start: 2022-11-08 | End: 2022-12-01

## 2022-11-08 RX ORDER — QUETIAPINE FUMARATE 25 MG/1
25 TABLET, FILM COATED ORAL 2 TIMES DAILY
Qty: 90 TABLET | Refills: 0 | Status: SHIPPED | OUTPATIENT
Start: 2022-11-08 | End: 2022-12-01

## 2022-11-08 RX ORDER — LEVETIRACETAM 750 MG/1
750 TABLET ORAL 2 TIMES DAILY
Qty: 60 TABLET | Refills: 0 | Status: SHIPPED | OUTPATIENT
Start: 2022-11-08 | End: 2022-12-01

## 2022-11-08 NOTE — TELEPHONE ENCOUNTER
Patient still in transitional care unit and being discharged today they will take care of her medications    Mara Murillo MD

## 2022-11-08 NOTE — PROGRESS NOTES
S-(situation): TCU Discharge    B-(background): Patient was inpatient at [facility] 7/27/22-10/4/22 due to left lateral ventricular abscess with severe sepsis s/p left frontal ventriculostomy (7/31/22), acute infectious and metabolic encephalopathy (resolved), concern for cognitive impairment with SLUMS 18/30, trigeminal neuralgia, severe dysphagia, severe malnutrition related to acute on chronic medical disease-s/p g-tube placement (8/16/22), C. Diff, acute on chronic anemia, depression/anxiety with suicidal thoughts and agitation, acute on chronic back pain, physical deconditioning, hyperglycemia-suspect stress induced (resolved), hypokalemia, hypophosphatemia (resolved).  Patient was previously at [facility] 7/19/22-7/27/22 for severe sepsis due to COVID-19 and suspected bacterial pneumonia.  Patient deteriorated despite seizure medications and antibiotics and MRI suggested brain abscess; neurosurgery recommended transfer to a higher level of care at [facility].       Patient was discharged to [facility] TCU for rehabilitation.    A-(assessment): Patient was discharged to home on 11/7/22 with [facility] home care; RN, PT and OT.    R-(recommendations/plan): Care Coordination program and MTM referral placed.  CPN will monitor for any newly identified care navigation needs in 1 month.    Melissa Behl BSN, RN, PHN, [facility]  RN Clinical Product Navigator  [phone]

## 2022-11-08 NOTE — TELEPHONE ENCOUNTER
Pt discharged from [facility] 11/07/2022.    The Home Care/Assisted Living/Nursing Facility is calling regarding an established patient.  Has the patient seen Home Care in the past or is currently residing in Assisted Living or Nursing Facility? No.     Prashant calling from [facility] requesting the following orders that are NOT within the Home Care, Assisted Living or Nursing Home Eval and Treatment standing order and must be ordered by a Licensed Practitioner.    Preferred Call Back Number: [phone]    RN  PT  OT  Home health aid  Evaluate and treat    Routing to Licensed Practitioner (Provider) to review request and provide approval or recommendation.    Writer has verified Requestor will send fax to have orders signed.    Flaquita Tee RN

## 2022-11-08 NOTE — TELEPHONE ENCOUNTER
11-8-22  Medication Question or Refill      What medication are you calling about (include dose and sig)?:   OXcarbazepine (TRILEPTAL) 150 MG tablet  &  acetaminophen (TYLENOL) 325 MG tablet    Who prescribed the medication?: [facility]    Do you need a refill? Yes:     When did you use the medication last? daily    Patient offered an appointment? No    Do you have any questions or concerns?  No    Preferred Pharmacy:   [id number] - [place], [place] - [address] AT [facility] [address]  [address]  [place] [address]  Phone: [phone] Fax: [phone]          Could we send this information to you in [employer] or would you prefer to receive a phone call?:   Patient would prefer a phone call   Okay to leave a detailed message?: Yes at Home number on file [phone] (home)

## 2022-11-09 DIAGNOSIS — G43.009 MIGRAINE WITHOUT AURA AND WITHOUT STATUS MIGRAINOSUS, NOT INTRACTABLE: ICD-10-CM

## 2022-11-09 DIAGNOSIS — G50.0 TRIGEMINAL NEURALGIA: ICD-10-CM

## 2022-11-09 RX ORDER — ACETAMINOPHEN AND CODEINE PHOSPHATE 300; 30 MG/1; MG/1
1 TABLET ORAL EVERY 6 HOURS PRN
Qty: 120 TABLET | Refills: 0 | Status: SHIPPED | OUTPATIENT
Start: 2022-11-09 | End: 2022-12-16

## 2022-11-09 NOTE — TELEPHONE ENCOUNTER
ABRIL Cerda, [facility] called to request verbal orders for RN, PT, OT to start services 11/8/22, be called in by end of day.  Prashant said they will be unable to start services/treatment until verbal orders are received.      RN assessed patient Tuesday 11/8/22 and opened  PT/OT are scheduled to eval patient Thursday  11/10/22    A plan of care will be faxed later for MD to sign and fax back.  Any questions please call Prashant at [phone].  Thank you.

## 2022-11-09 NOTE — TELEPHONE ENCOUNTER
Routing refill request to provider for review/approval because:  Drug not on the [facility] refill protocol  Patient discharged from TCU on 11/7/2022, patient states she was not given any Tylenol 3 to take home.  Requesting a prescription for #120 as previously prescribed.      Last Written Prescription Date:  11/4/2022  Last Fill Quantity: 10,  # refills: 0   Last office visit provider:  5/19/2022     Requested Prescriptions   Pending Prescriptions Disp Refills     acetaminophen-codeine (TYLENOL W/CODEINE #3) 300-30 MG per tablet 120 tablet 0     Sig: Take 1 tablet by mouth every 6 hours as needed for severe pain       There is no refill protocol information for this order          Markus Negron RN 11/09/22 11:52 AM

## 2022-11-09 NOTE — TELEPHONE ENCOUNTER
Orders were placed by Dr. Murillo on 11/8/22. Notified RN.     Shelia Nunn RN, BSN  [facility]

## 2022-11-10 ENCOUNTER — PATIENT OUTREACH (OUTPATIENT)
Dept: CARE COORDINATION | Facility: CLINIC | Age: 77
End: 2022-11-10

## 2022-11-10 NOTE — PROGRESS NOTES
Clinic Care Coordination Contact  [facility]/Voicemail       Clinical Data: Care Coordinator Outreach  Outreach attempted x 1.  Left message on daughters voicemail with call back information.  Left 24 hr nurse triage [phone] ([phone])   if patient or daughter were to need anything.    Plan:Care Coordinator will try to reach patient again in 1-2 business days.

## 2022-11-11 ENCOUNTER — PATIENT OUTREACH (OUTPATIENT)
Dept: CARE COORDINATION | Facility: CLINIC | Age: 77
End: 2022-11-11

## 2022-11-11 ENCOUNTER — TELEPHONE (OUTPATIENT)
Dept: FAMILY MEDICINE | Facility: CLINIC | Age: 77
End: 2022-11-11

## 2022-11-11 NOTE — PROGRESS NOTES
Clinic Care Coordination Contact  [facility]/Voicemail       Clinical Data: Care Coordinator Outreach  Outreach attempted x 1.  Left message on daughters voicemail with call back information and requested return call.  Left clinic's phone number and [phone] ([phone]).  Instructed there are nurses on call 24 hours a day if needed.  Additionally explained we have nurses and SW's available if needed for patient/daughter to call back.    Plan: Care Coordinator will send unable to contact letter with care coordinator contact information via [employer]. Care Coordinator will do no further outreaches at this time.

## 2022-11-11 NOTE — LETTER
[facility] CARE COORDINATION  [address] [facility] [place] [address]    November 11, 2022    Julisa Chaney  [address]  [place] [address]      Dear Julisa,        I am a clinic care coordinator who works with Mara Murillo MD with the [facility]. I have been trying to reach you recently to introduce Clinic Care Coordination. Below is a description of clinic care coordination and how I can further assist you.       The clinic care coordination team is made up of a registered nurse, , financial resource worker and community health worker who understand the health care system. The goal of clinic care coordination is to help you manage your health and improve access to the health care system. Our team works alongside your provider to assist you in determining your health and social needs. We can help you obtain health care and community resources, providing you with necessary information and education. We can work with you through any barriers and develop a care plan that helps coordinate and strengthen the communication between you and your care team.    Please feel free to contact me or our Community Healthcare Worker  with any questions or concerns regarding care coordination and what we can offer.      We are focused on providing you with the highest-quality healthcare experience possible.    Sincerely,     Aleshia Moraes RN  Clinic Care Coordination  [phone]    Meghan Mcfarlane  Community Healthcare Worker  [phone]

## 2022-11-11 NOTE — TELEPHONE ENCOUNTER
[facility]     Has an eval and treat order, now requesting    PT 1 x week gate train, home exercise, transfer training    Skilled Nursing in place    RN gave verbal ok, awaiting paperwork for signature.    ABRIL Love  [facility]

## 2022-11-15 ENCOUNTER — MEDICAL CORRESPONDENCE (OUTPATIENT)
Dept: HEALTH INFORMATION MANAGEMENT | Facility: CLINIC | Age: 77
End: 2022-11-15

## 2022-11-17 ENCOUNTER — TELEPHONE (OUTPATIENT)
Dept: FAMILY MEDICINE | Facility: CLINIC | Age: 77
End: 2022-11-17

## 2022-11-17 NOTE — TELEPHONE ENCOUNTER
Park SAMUELS from [facility], calling to get a VORB to decrease home RN visits starting the week of 11/21/22 to: x1 week for 2 weeks, and then every other week x5 weeks.    Also requesting a VORB to decrease home health aid visits to: x1 week for 4 weeks.    Writer gave VORB for both request.  Park voiced that she will fax over paperwork to reflect changes for signatures.     Josiane RN, BSN  [place], [place]

## 2022-11-24 DIAGNOSIS — R27.8 ASTERIXIS: ICD-10-CM

## 2022-11-25 RX ORDER — LEVETIRACETAM 500 MG/1
TABLET ORAL
Qty: 180 TABLET | Refills: 0 | Status: SHIPPED | OUTPATIENT
Start: 2022-11-25 | End: 2022-12-01 | Stop reason: DRUGHIGH

## 2022-11-29 ENCOUNTER — TELEPHONE (OUTPATIENT)
Dept: FAMILY MEDICINE | Facility: CLINIC | Age: 77
End: 2022-11-29

## 2022-11-29 DIAGNOSIS — G50.9 TRIGEMINAL NERVE DISORDER: ICD-10-CM

## 2022-11-29 DIAGNOSIS — E55.9 VITAMIN D DEFICIENCY: ICD-10-CM

## 2022-11-29 DIAGNOSIS — D50.0 IRON DEFICIENCY ANEMIA DUE TO CHRONIC BLOOD LOSS: ICD-10-CM

## 2022-11-29 RX ORDER — FERROUS SULFATE 325(65) MG
TABLET ORAL
Qty: 30 TABLET | Refills: 0 | Status: SHIPPED | OUTPATIENT
Start: 2022-11-29 | End: 2022-12-01

## 2022-11-29 NOTE — TELEPHONE ENCOUNTER
"Routing refill request to provider for review/approval because:  A break in medication    Last Written Prescription Date: 5/4/21  Last Fill Quantity: 30,  # refills: 0   Last office visit provider:   5/19/22    Requested Prescriptions   Pending Prescriptions Disp Refills     FEROSUL 325 (65 Fe) MG tablet [Pharmacy Med Name: FERROUS SULFATE 325MG (5GR) TABS] 30 tablet 0     Sig: TAKE 1 TABLET(325 MG) BY MOUTH DAILY WITH BREAKFAST       Iron Supplements Passed - 11/29/2022 12:14 PM        Passed - Patient is 12 years of age or older        Passed - Recent (12 mo) or future (30 days) visit within the authorizing provider's specialty     Patient has had an office visit with the authorizing provider or a provider within the authorizing providers department within the previous 12 mos or has a future within next 30 days. See \"Patient Info\" tab in inbasket, or \"Choose Columns\" in Meds & Orders section of the refill encounter.              Passed - Hgb OR Hct on record within the past 12 mos.     Patient need only have had a HGB or HCT on file in the past 12 mos. That result does not need to be normal.    Recent Labs   Lab Test 10/10/22  0712 09/23/22  0837 09/16/22  0846   HGB 9.9* 10.5* 10.7*     Recent Labs   Lab Test 10/10/22  0712 09/23/22  0837 09/16/22  0846   HCT 32.7* 33.5* 34.8*       Please verify a HGB or HCT has been checked SINCE THE LAST DOSE CHANGE.            Passed - Medication is active on med list             LILY TAM RN 11/29/22 12:14 PM  "

## 2022-11-29 NOTE — TELEPHONE ENCOUNTER
"11-29-22  Leigh called from [facility]  Home care checking status on the faxed \"plan of care home care\" forms she faxed to DR Murillo 11-14-22,11-17-22 & 11-15-22  Fax back to: [phone]    luis angel   "

## 2022-11-30 RX ORDER — CHOLECALCIFEROL (VITAMIN D3) 50 MCG
TABLET ORAL
Qty: 90 TABLET | Refills: 4 | Status: SHIPPED | OUTPATIENT
Start: 2022-11-30 | End: 2022-12-01

## 2022-11-30 RX ORDER — FERROUS SULFATE 325(65) MG
TABLET ORAL
Qty: 30 TABLET | Refills: 0 | Status: SHIPPED | OUTPATIENT
Start: 2022-11-30 | End: 2022-12-21

## 2022-11-30 RX ORDER — MULTIVITAMIN
TABLET ORAL
Qty: 100 TABLET | Refills: 3 | Status: SHIPPED | OUTPATIENT
Start: 2022-11-30 | End: 2022-12-01

## 2022-11-30 NOTE — TELEPHONE ENCOUNTER
"Routing refill request to provider for review/approval because:  Labs out of range:      Last Written Prescription Date:  5/4/21  Last Fill Quantity: 100,  # refills: 3   Last office visit provider:  5/19/22     Requested Prescriptions   Pending Prescriptions Disp Refills     VITAMIN D3 50 MCG (2000 UT) tablet [Pharmacy Med Name: VITAMIN D3 2,000IU TABLETS] 90 tablet 4     Sig: TAKE 1 TABLET BY MOUTH DAILY       Vitamin Supplements (Adult) Protocol Failed - 11/29/2022 12:17 PM        Failed - Normal Hgb on file in past 12 mos     Recent Labs   Lab Test 10/10/22  0712   HGB 9.9*               Passed - High dose Vitamin D not ordered        Passed - Recent (12 mo) or future (30 days) visit within the authorizing provider's specialty     Patient has had an office visit with the authorizing provider or a provider within the authorizing providers department within the previous 12 mos or has a future within next 30 days. See \"Patient Info\" tab in inbasket, or \"Choose Columns\" in Meds & Orders section of the refill encounter.              Passed - Medication is active on med list           Multiple Vitamin (MULTIVITAMIN) TABS [Pharmacy Med Name: MULTIVITAMIN TABLETS] 100 tablet 3     Sig: TAKE 1 TABLET BY MOUTH DAILY       Vitamin Supplements (Adult) Protocol Failed - 11/29/2022 12:17 PM        Failed - Normal Hgb on file in past 12 mos     Recent Labs   Lab Test 10/10/22  0712   HGB 9.9*               Passed - High dose Vitamin D not ordered        Passed - Recent (12 mo) or future (30 days) visit within the authorizing provider's specialty     Patient has had an office visit with the authorizing provider or a provider within the authorizing providers department within the previous 12 mos or has a future within next 30 days. See \"Patient Info\" tab in inbasket, or \"Choose Columns\" in Meds & Orders section of the refill encounter.              Passed - Medication is active on med list           FEROSUL 325 (65 Fe) MG tablet " "[Pharmacy Med Name: FERROUS SULFATE 325MG (5GR) TABS] 30 tablet 0     Sig: TAKE 1 TABLET(325 MG) BY MOUTH DAILY WITH BREAKFAST       Iron Supplements Passed - 11/29/2022 12:17 PM        Passed - Patient is 12 years of age or older        Passed - Recent (12 mo) or future (30 days) visit within the authorizing provider's specialty     Patient has had an office visit with the authorizing provider or a provider within the authorizing providers department within the previous 12 mos or has a future within next 30 days. See \"Patient Info\" tab in inbasket, or \"Choose Columns\" in Meds & Orders section of the refill encounter.              Passed - Hgb OR Hct on record within the past 12 mos.     Patient need only have had a HGB or HCT on file in the past 12 mos. That result does not need to be normal.    Recent Labs   Lab Test 10/10/22  0712 09/23/22  0837 09/16/22  0846   HGB 9.9* 10.5* 10.7*     Recent Labs   Lab Test 10/10/22  0712 09/23/22  0837 09/16/22  0846   HCT 32.7* 33.5* 34.8*       Please verify a HGB or HCT has been checked SINCE THE LAST DOSE CHANGE.            Passed - Medication is active on med list             Alyce Baker RN 11/30/22 12:44 PM  "

## 2022-12-01 ENCOUNTER — OFFICE VISIT (OUTPATIENT)
Dept: FAMILY MEDICINE | Facility: CLINIC | Age: 77
End: 2022-12-01
Payer: COMMERCIAL

## 2022-12-01 VITALS
WEIGHT: 110.8 LBS | SYSTOLIC BLOOD PRESSURE: 117 MMHG | HEIGHT: 65 IN | DIASTOLIC BLOOD PRESSURE: 60 MMHG | BODY MASS INDEX: 18.46 KG/M2 | HEART RATE: 100 BPM

## 2022-12-01 DIAGNOSIS — K08.129 LOSS OF TEETH DUE TO PERIODONTAL DISEASE, UNSPECIFIED EDENTULISM CLASS: ICD-10-CM

## 2022-12-01 DIAGNOSIS — G47.00 PERSISTENT INSOMNIA: ICD-10-CM

## 2022-12-01 DIAGNOSIS — G50.9 TRIGEMINAL NERVE DISORDER: ICD-10-CM

## 2022-12-01 DIAGNOSIS — R42 DIZZINESS: ICD-10-CM

## 2022-12-01 DIAGNOSIS — G89.4 CHRONIC PAIN SYNDROME: ICD-10-CM

## 2022-12-01 DIAGNOSIS — R27.8 ASTERIXIS: ICD-10-CM

## 2022-12-01 DIAGNOSIS — Z86.19 HISTORY OF SEPSIS: ICD-10-CM

## 2022-12-01 DIAGNOSIS — F41.9 ANXIETY: ICD-10-CM

## 2022-12-01 DIAGNOSIS — E55.9 VITAMIN D DEFICIENCY: ICD-10-CM

## 2022-12-01 DIAGNOSIS — G06.0 PYOGENIC BRAIN ABSCESS: ICD-10-CM

## 2022-12-01 DIAGNOSIS — D50.0 IRON DEFICIENCY ANEMIA DUE TO CHRONIC BLOOD LOSS: ICD-10-CM

## 2022-12-01 DIAGNOSIS — K59.00 CONSTIPATION, UNSPECIFIED CONSTIPATION TYPE: ICD-10-CM

## 2022-12-01 DIAGNOSIS — Z09 ENCOUNTER FOR EXAMINATION FOLLOWING TREATMENT AT HOSPITAL: Primary | ICD-10-CM

## 2022-12-01 DIAGNOSIS — F32.0 CURRENT MILD EPISODE OF MAJOR DEPRESSIVE DISORDER WITHOUT PRIOR EPISODE (H): ICD-10-CM

## 2022-12-01 LAB
ERYTHROCYTE [DISTWIDTH] IN BLOOD BY AUTOMATED COUNT: 18.7 % (ref 10–15)
HCT VFR BLD AUTO: 39.3 % (ref 35–47)
HGB BLD-MCNC: 11.8 G/DL (ref 11.7–15.7)
MCH RBC QN AUTO: 24 PG (ref 26.5–33)
MCHC RBC AUTO-ENTMCNC: 30 G/DL (ref 31.5–36.5)
MCV RBC AUTO: 80 FL (ref 78–100)
PLATELET # BLD AUTO: 423 10E3/UL (ref 150–450)
RBC # BLD AUTO: 4.92 10E6/UL (ref 3.8–5.2)
WBC # BLD AUTO: 5.3 10E3/UL (ref 4–11)

## 2022-12-01 PROCEDURE — 99214 OFFICE O/P EST MOD 30 MIN: CPT | Performed by: FAMILY MEDICINE

## 2022-12-01 PROCEDURE — 85027 COMPLETE CBC AUTOMATED: CPT | Performed by: FAMILY MEDICINE

## 2022-12-01 PROCEDURE — 36415 COLL VENOUS BLD VENIPUNCTURE: CPT | Performed by: FAMILY MEDICINE

## 2022-12-01 PROCEDURE — 80048 BASIC METABOLIC PNL TOTAL CA: CPT | Performed by: FAMILY MEDICINE

## 2022-12-01 RX ORDER — CHOLECALCIFEROL (VITAMIN D3) 50 MCG
1 TABLET ORAL DAILY
Qty: 90 TABLET | Refills: 4 | Status: SHIPPED | OUTPATIENT
Start: 2022-12-01 | End: 2024-01-06

## 2022-12-01 RX ORDER — QUETIAPINE FUMARATE 50 MG/1
50 TABLET, FILM COATED ORAL AT BEDTIME
Qty: 30 TABLET | Refills: 0 | Status: SHIPPED | OUTPATIENT
Start: 2022-12-01 | End: 2022-12-21

## 2022-12-01 RX ORDER — MULTIVITAMIN
1 TABLET ORAL DAILY
Qty: 100 TABLET | Refills: 3 | Status: SHIPPED | OUTPATIENT
Start: 2022-12-01 | End: 2024-02-04

## 2022-12-01 RX ORDER — FERROUS SULFATE 325(65) MG
TABLET ORAL
Qty: 90 TABLET | Refills: 3 | Status: SHIPPED | OUTPATIENT
Start: 2022-12-01 | End: 2023-02-01

## 2022-12-01 RX ORDER — AMOXICILLIN 250 MG
1 CAPSULE ORAL AT BEDTIME
Qty: 90 TABLET | Refills: 1 | Status: SHIPPED | OUTPATIENT
Start: 2022-12-01 | End: 2023-06-30

## 2022-12-01 RX ORDER — MECLIZINE HCL 12.5 MG 12.5 MG/1
12.5 TABLET ORAL 2 TIMES DAILY PRN
Qty: 60 TABLET | Refills: 0 | Status: SHIPPED | OUTPATIENT
Start: 2022-12-01 | End: 2022-12-21

## 2022-12-01 RX ORDER — LEVETIRACETAM 750 MG/1
TABLET ORAL
Qty: 180 TABLET | OUTPATIENT
Start: 2022-12-01

## 2022-12-01 RX ORDER — MIRTAZAPINE 15 MG/1
15 TABLET, FILM COATED ORAL AT BEDTIME
Qty: 30 TABLET | Refills: 1 | Status: SHIPPED | OUTPATIENT
Start: 2022-12-01 | End: 2023-02-15

## 2022-12-01 RX ORDER — LEVETIRACETAM 750 MG/1
750 TABLET ORAL 2 TIMES DAILY
Qty: 60 TABLET | Refills: 0 | Status: SHIPPED | OUTPATIENT
Start: 2022-12-01 | End: 2022-12-21

## 2022-12-01 ASSESSMENT — PATIENT HEALTH QUESTIONNAIRE - PHQ9
SUM OF ALL RESPONSES TO PHQ QUESTIONS 1-9: 0
10. IF YOU CHECKED OFF ANY PROBLEMS, HOW DIFFICULT HAVE THESE PROBLEMS MADE IT FOR YOU TO DO YOUR WORK, TAKE CARE OF THINGS AT HOME, OR GET ALONG WITH OTHER PEOPLE: NOT DIFFICULT AT ALL
SUM OF ALL RESPONSES TO PHQ QUESTIONS 1-9: 0

## 2022-12-01 NOTE — PROGRESS NOTES
Assessment & Plan     Patient presents with:  Hospital F/U: [facility]IL [facility] 7/22-10/4, needing refills on meds from the hospital.        Julisa was seen today for hospital f/u.    Diagnoses and all orders for this visit:    Encounter for examination following treatment at hospital  Comments:  daughter is helping with med managment. does have nurse come every wk, OT/PT weekly     Persistent insomnia  Comments:  trial of remeron for both insomnia and depression, as patient didn't like the side effect of effexor   Orders:  -     levETIRAcetam (KEPPRA) 750 MG tablet; Take 1 tablet (750 mg) by mouth 2 times daily    Iron deficiency anemia due to chronic blood loss  Comments:  Rechecking hemoglobin today continue iron supplement  Orders:  -     ferrous sulfate (FEROSUL) 325 (65 Fe) MG tablet; TAKE 1 TABLET(325 MG) BY MOUTH DAILY WITH BREAKFAST Strength: 325 (65 Fe) mg  -     CBC with platelets; Future  -     CBC with platelets    Constipation, unspecified constipation type  -     senna-docusate (SENOKOT-S/PERICOLACE) 8.6-50 MG tablet; Take 1 tablet by mouth At Bedtime    Anxiety  -     QUEtiapine (SEROQUEL) 50 MG tablet; Take 1 tablet (50 mg) by mouth At Bedtime    history of Pyogenic brain abscess  Comments:  On exam healed bur hole.  Denies any pain or pressure at the site of the surgery overall doing well    Chronic pain syndrome  Comments:  stable     Dizziness  Comments:  improving with meclizine    Orders:  -     meclizine (ANTIVERT) 12.5 MG tablet; Take 1 tablet (12.5 mg) by mouth 2 times daily as needed for dizziness    Current mild episode of major depressive disorder without prior episode (H)  Comments:  stable with mirtazipine and seroquel  Orders:  -     mirtazapine (REMERON) 15 MG tablet; Take 1 tablet (15 mg) by mouth At Bedtime    Trigeminal nerve disorder  -     Multiple Vitamin (MULTIVITAMIN) TABS; Take 1 tablet by mouth daily  -     Basic metabolic panel  (Ca, Cl, CO2, Creat, Gluc, K, Na, BUN); Future  -      Basic metabolic panel  (Ca, Cl, CO2, Creat, Gluc, K, Na, BUN)    Vitamin D deficiency  -     Vitamin D3 50 mcg (2000 units) tablet; Take 1 tablet (50 mcg) by mouth daily    History of sepsis  Comments:  no new fever chills, no change in mental status since discharge   Orders:  -     Basic metabolic panel  (Ca, Cl, CO2, Creat, Gluc, K, Na, BUN); Future  -     Basic metabolic panel  (Ca, Cl, CO2, Creat, Gluc, K, Na, BUN)    Loss of teeth due to periodontal disease, unspecified edentulism class  -     Dental Referral; Future                No follow-ups on file.      Subjective   Julisa Chaney 77 year old who presents for the following health issues     HPI     Answers for HPI/ROS submitted by the patient on 12/1/2022  If you checked off any problems, how difficult have these problems made it for you to do your work, take care of things at home, or get along with other people?: Not difficult at all  PHQ9 TOTAL SCORE: 0      Summary of nursing facility stay:  discharge summery reviewed   Left lateral ventriculitis with ventricular abscess, s/p left frontal ventriculostomy  Sepsis  Acute, resolved. Completed 6 weeks of IV antibiotics on September 8. Follow-up with neurosurgery as directed     Dysphagia  Ongoing. Changed to DD2 diet per ST. Monitor.      Recent history of COVID-19 infection with superimposed bacterial pneumonia   Resolved, stable. Monitor respiratory status     Recent history of C. difficile colitis  Constipation  Resolved. Monitor for recurrence. Due to constipation increased senna s to 2 tabs twice daily, effective/      GERD  Continue omeprazole 40 mg daily     Acute on chronic anemia  Iron deficiency  Chronic, stable. Hgb 9.9 on 10/9. Continue ferrous sulfate 325 mg daily. Monitor CBC PRN.      Trigeminal neuralgia  Migraine  Chronic pain syndrome  Chronic back pain  Chronic, stable. Continue oxcarbazepine 450 mg at bedtime, topiramate 50 mg at bedtime, nortriptyline 25 mg twice daily, Keppra 750  mg twice daily, lidocaine patch, acetaminophen 650 mg every 6 hours PRN and Tylenol with codeine 300--30 mg, 1 tablet every 6 hours as needed for severe pain. F/U neurology as recommended.      Depression with suicidal ideation  Anxiety  Chronic, stable. Continue mirtazapine 15 mg at bedtime, Seroquel 25 mg twice daily, 50 mg at bedtime, and PRN 12.5 mg twice daily as well as gabapentin 100 mg 3 times daily PRN. Monitor mood.     Malnutrition  Supplements per dietician. Monitor weight and intake     Physical deconditioning  Acute, ongoing. Continue PT/OT evaluation and therapy     Dizziness  Newly reported. Started Meclizine PRN.      Discharge Medications:  MED REC REQUIRED  Post Medication Reconciliation Status:  Discharge medications reconciled, continue medications without change       Hospital Follow-up Visit:  Recent Hospitalization/ED:  [facility] stay 7/27/22 to 10/4/22.  Date of SNF Admission: 10/4/22  Date of SNF  Discharge: 11/7/22  Discharged to: previous independent home    Reason(s) for Admission: hospitalized 07/22 for severe sepsis due to COVID-19 infection and bacterial pneumonia, acute and prolonged metabolic encephalopathy. Head CT 7/25/2022 showed a possible left frontal mass causing vasogenic edema. 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    Was your hospitalization related to COVID-19? No , but had covid while she was there among other infections  Problems taking medications regularly:  None  Medication changes since discharge: None  Problems adhering to non-medication therapy:  None    Summary of hospitalization:  [facility] discharge summary reviewed  Diagnostic Tests/Treatments reviewed.  Follow up needed: neurologist for routine care   Other Healthcare Providers Involved in Patient s Care:         Homecare and Physical Therapy  Update since discharge: improved.         Plan of care  communicated with patient and family                 Patient Active Problem List   Diagnosis     Osteopenia     Hyperlipidemia     Migraine headache     Trigeminal neuralgia     Counseling regarding advanced directives and goals of care     Controlled substance agreement signed     Hypercalcemia     Chronic pain syndrome     Iron deficiency anemia due to chronic blood loss     Abnormal chest CT     Mild major depression (H)     Pyloric ulcer, chronic     Sepsis (H)     Sepsis, due to unspecified organism, unspecified whether acute organ dysfunction present (H)     Infection due to 2019 novel coronavirus     ORTIZ (acute kidney injury) (H)     Encephalitis     Pyogenic brain abscess     Cerebral abscess        Current Outpatient Medications   Medication     acetaminophen-codeine (TYLENOL W/CODEINE #3) 300-30 MG per tablet     desonide (DESOWEN) 0.05 % cream     FEROSUL 325 (65 Fe) MG tablet     ferrous sulfate (FEROSUL) 325 (65 Fe) MG tablet     gabapentin (NEURONTIN) 100 MG capsule     levETIRAcetam (KEPPRA) 750 MG tablet     meclizine (ANTIVERT) 12.5 MG tablet     mirtazapine (REMERON) 15 MG tablet     Multiple Vitamin (MULTIVITAMIN) TABS     nortriptyline (PAMELOR) 25 MG capsule     omeprazole (PRILOSEC) 40 MG DR capsule     OXcarbazepine (TRILEPTAL) 150 MG tablet     polyvinyl alcohol (ARTIFICIAL TEARS) 1.4 % ophthalmic solution     QUEtiapine (SEROQUEL) 50 MG tablet     senna-docusate (SENOKOT-S/PERICOLACE) 8.6-50 MG tablet     topiramate (TOPAMAX) 50 MG tablet     Vitamin D3 50 mcg (2000 units) tablet     Lidocaine (LIDOCARE) 4 % Patch     No current facility-administered medications for this visit.          Social Determinants of Health     Tobacco Use: Medium Risk     Smoking Tobacco Use: Former     Smokeless Tobacco Use: Never     Passive Exposure: Not on file   Alcohol Use: Not on file   Financial Resource Strain: Not on file   Food Insecurity: Not on file   Transportation Needs: Not on file   Physical  "Activity: Not on file   Stress: Not on file   Social Connections: Not on file   Intimate Partner Violence: Not on file   Depression: Not at risk     PHQ-2 Score: 0   Housing Stability: Not on file        Review of Systems   Constitutional, HEENT, cardiovascular, pulmonary, GI, , musculoskeletal, neuro, skin, endocrine and psych systems are negative, except as otherwise noted.      Objective    /60 (BP Location: Left arm, Patient Position: Sitting, Cuff Size: Adult Small)   Pulse 100   Ht 1.651 m (5' 5\")   Wt 50.3 kg (110 lb 12.8 oz)   BMI 18.44 kg/m     LMP 06/01/2011   There is no height or weight on file to calculate BMI.  Physical Exam   GENERAL: healthy, alert and no distress  EYES: Eyes grossly normal to inspection, PERRL and conjunctivae and sclerae normal  NECK: no adenopathy, no asymmetry, masses, or scars and thyroid normal to palpation  RESP: lungs clear to auscultation - no rales, rhonchi or wheezes  CV: regular rate and rhythm, normal S1 S2, no S3 or S4, no murmur, click or rub, no peripheral edema and peripheral pulses strong  MS: wheel chair support but able to walk with help  SKIN: no suspicious lesions or rashes    Mara Murillo MD   [facility].  [phone]   "

## 2022-12-01 NOTE — LETTER
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 Due to the fact that Alissa is her full-time caregiver, she may need to be absent from work for 1/2 day-1 full day at various times throughout the duration of Julisa's care (unforseeable future). Alissa may need to be absent from work for patient care appointments, sick visits, transportation, etc.       If you have questions or concerns, please call the clinic at the number listed above.    Sincerely,         Mara Murillo MD

## 2022-12-01 NOTE — TELEPHONE ENCOUNTER
This refill request is a duplicate request, previously received or sent.  Sent denial notification to pharmacy.  Flaquita Tee RN  [facility]

## 2022-12-02 LAB
ANION GAP SERPL CALCULATED.3IONS-SCNC: 14 MMOL/L (ref 7–15)
BUN SERPL-MCNC: 9.1 MG/DL (ref 8–23)
CALCIUM SERPL-MCNC: 8.9 MG/DL (ref 8.8–10.2)
CHLORIDE SERPL-SCNC: 107 MMOL/L (ref 98–107)
CREAT SERPL-MCNC: 0.6 MG/DL (ref 0.51–0.95)
DEPRECATED HCO3 PLAS-SCNC: 25 MMOL/L (ref 22–29)
GFR SERPL CREATININE-BSD FRML MDRD: >90 ML/MIN/1.73M2
GLUCOSE SERPL-MCNC: 131 MG/DL (ref 70–99)
POTASSIUM SERPL-SCNC: 3.1 MMOL/L (ref 3.4–5.3)
SODIUM SERPL-SCNC: 146 MMOL/L (ref 136–145)

## 2022-12-09 ENCOUNTER — PATIENT OUTREACH (OUTPATIENT)
Dept: CARE COORDINATION | Facility: CLINIC | Age: 77
End: 2022-12-09

## 2022-12-09 NOTE — PROGRESS NOTES
S-(situation): TCU Discharge Progression    B-(background): [facility]    A-(assessment): Patient was unable to be reached by primary care-care coordination services.  Patient had follow-up with PCP as directed.  No new network navigation needs identified.    R-(recommendations/plan): RN Clinical Product Navigator will perform no further monitoring/outreaches at this time.    Melissa Behl BSN, RN, PHN, [facility]  RN Clinical Product Navigator  [phone]

## 2022-12-16 DIAGNOSIS — G50.0 TRIGEMINAL NEURALGIA: ICD-10-CM

## 2022-12-16 DIAGNOSIS — F41.9 ANXIETY: ICD-10-CM

## 2022-12-16 DIAGNOSIS — G43.009 MIGRAINE WITHOUT AURA AND WITHOUT STATUS MIGRAINOSUS, NOT INTRACTABLE: ICD-10-CM

## 2022-12-16 RX ORDER — NORTRIPTYLINE HCL 25 MG
25 CAPSULE ORAL 2 TIMES DAILY
Qty: 180 CAPSULE | Refills: 0 | Status: SHIPPED | OUTPATIENT
Start: 2022-12-16 | End: 2023-01-11

## 2022-12-17 ENCOUNTER — MYC REFILL (OUTPATIENT)
Dept: FAMILY MEDICINE | Facility: CLINIC | Age: 77
End: 2022-12-17

## 2022-12-17 DIAGNOSIS — G50.0 TRIGEMINAL NEURALGIA: ICD-10-CM

## 2022-12-17 DIAGNOSIS — F41.9 ANXIETY: ICD-10-CM

## 2022-12-17 DIAGNOSIS — G43.009 MIGRAINE WITHOUT AURA AND WITHOUT STATUS MIGRAINOSUS, NOT INTRACTABLE: ICD-10-CM

## 2022-12-19 RX ORDER — NORTRIPTYLINE HCL 25 MG
25 CAPSULE ORAL 2 TIMES DAILY
Qty: 180 CAPSULE | Refills: 0 | OUTPATIENT
Start: 2022-12-19

## 2022-12-19 RX ORDER — NORTRIPTYLINE HCL 25 MG
CAPSULE ORAL
Qty: 360 CAPSULE | Refills: 0 | OUTPATIENT
Start: 2022-12-19

## 2022-12-20 DIAGNOSIS — F41.9 ANXIETY: ICD-10-CM

## 2022-12-20 DIAGNOSIS — G47.00 PERSISTENT INSOMNIA: ICD-10-CM

## 2022-12-20 DIAGNOSIS — R42 DIZZINESS: ICD-10-CM

## 2022-12-20 DIAGNOSIS — D50.0 IRON DEFICIENCY ANEMIA DUE TO CHRONIC BLOOD LOSS: ICD-10-CM

## 2022-12-20 NOTE — TELEPHONE ENCOUNTER
Late entry for phone call with patient's daughter on 12/20/2022  Patient's daughter called requesting that mother's medications to please be reordered.    Per daughter patient's bottle says that she is on nortriptyline 25 mg 2 tablets twice a day.  She does not know the date on the bottle and she did not have the bottle in her possession.    Per review of chart patient was discharged from the hospital on 10/4/22 on  nortriptyline 25 mg 1 tablet twice a day.  Her medication was decreased from her  dose of 25mg 2 tablets twice a day prior to hospital admission. I informed her daughter that the patient  should continue the 1 tablet twice a day and the bottle she used to request a refill  may have been filled prior to her hospitalization.       In addition the patient historically has been on 120 tablets of Tylenol with codeine for pain.  She recently received a refill on 12/16/2022 for 30 tablets of the Tylenol with codeine from Lena Castañeda NP.   At this time  patient has 30 tablets and will wait until her primary care provider Dr. Murillo returns to fill  the additional 90 tablets.      Daughter agreed with plan.    Rose Bar MD  For Dr. Murillo

## 2022-12-21 RX ORDER — QUETIAPINE FUMARATE 50 MG/1
50 TABLET, FILM COATED ORAL AT BEDTIME
Qty: 90 TABLET | Refills: 1 | Status: SHIPPED | OUTPATIENT
Start: 2022-12-21 | End: 2023-07-20

## 2022-12-21 RX ORDER — FERROUS SULFATE 325(65) MG
325 TABLET ORAL
Qty: 90 TABLET | Refills: 3 | Status: SHIPPED | OUTPATIENT
Start: 2022-12-21 | End: 2023-07-21

## 2022-12-21 RX ORDER — MECLIZINE HCL 12.5 MG 12.5 MG/1
TABLET ORAL
Qty: 180 TABLET | Refills: 1 | Status: ON HOLD | OUTPATIENT
Start: 2022-12-21 | End: 2023-07-03

## 2022-12-21 NOTE — TELEPHONE ENCOUNTER
"Routing refill request to provider for review/approval because:  Drug not on the [facility] refill protocol     Last Written Prescription Date:  12/1/22  Last Fill Quantity: 60,  # refills: 0   Last office visit provider:  12/1/22     Requested Prescriptions   Pending Prescriptions Disp Refills     levETIRAcetam (KEPPRA) 750 MG tablet [Pharmacy Med Name: LEVETIRACETAM 750MG TABLETS] 60 tablet 0     Sig: TAKE 1 TABLET(750 MG) BY MOUTH TWICE DAILY       There is no refill protocol information for this order      Signed Prescriptions Disp Refills    ferrous sulfate (FEROSUL) 325 (65 Fe) MG tablet 90 tablet 3     Sig: Take 1 tablet (325 mg) by mouth daily (with breakfast)       Iron Supplements Passed - 12/20/2022 11:21 AM        Passed - Patient is 12 years of age or older        Passed - Recent (12 mo) or future (30 days) visit within the authorizing provider's specialty     Patient has had an office visit with the authorizing provider or a provider within the authorizing providers department within the previous 12 mos or has a future within next 30 days. See \"Patient Info\" tab in inbasket, or \"Choose Columns\" in Meds & Orders section of the refill encounter.              Passed - Hgb OR Hct on record within the past 12 mos.     Patient need only have had a HGB or HCT on file in the past 12 mos. That result does not need to be normal.    Recent Labs   Lab Test 12/01/22  1442 10/10/22  0712 09/23/22  0837   HGB 11.8 9.9* 10.5*     Recent Labs   Lab Test 12/01/22  1442 10/10/22  0712 09/23/22  0837   HCT 39.3 32.7* 33.5*       Please verify a HGB or HCT has been checked SINCE THE LAST DOSE CHANGE.            Passed - Medication is active on med list          meclizine (ANTIVERT) 12.5 MG tablet 180 tablet 1     Sig: TAKE 1 TABLET(12.5 MG) BY MOUTH TWICE DAILY AS NEEDED FOR DIZZINESS        Antivertigo/Antiemetic Agents Passed - 12/20/2022 11:21 AM        Passed - Recent (12 mo) or future (30 days) visit within the authorizing " "provider's specialty     Patient has had an office visit with the authorizing provider or a provider within the authorizing providers department within the previous 12 mos or has a future within next 30 days. See \"Patient Info\" tab in inbasket, or \"Choose Columns\" in Meds & Orders section of the refill encounter.              Passed - Medication is active on med list        Passed - Patient is 18 years of age or older          QUEtiapine (SEROQUEL) 50 MG tablet 90 tablet 1     Sig: Take 1 tablet (50 mg) by mouth At Bedtime       Antipsychotic Medications Passed - 12/21/2022  3:28 PM        Passed - Blood pressure under 140/90 in past 12 months     BP Readings from Last 3 Encounters:   12/01/22 117/60   11/02/22 104/69   10/26/22 101/67                 Passed - Patient is 12 years of age or older        Passed - Lipid panel on file within the past 12 months     Recent Labs   Lab Test 05/19/22  1430   CHOL 247*   TRIG 105   HDL 77   *               Passed - CBC on file in past 12 months     Recent Labs   Lab Test 12/01/22  1442   WBC 5.3   RBC 4.92   HGB 11.8   HCT 39.3                    Passed - Heart Rate on file within past 12 months     Pulse Readings from Last 3 Encounters:   12/01/22 100   11/02/22 73   10/26/22 78               Passed - A1c or Glucose on file in past 12 months     Recent Labs   Lab Test 12/01/22  1442 07/20/22  0923 07/19/22  1901   *   < > 252*   A1C  --   --  5.6    < > = values in this interval not displayed.       Please review patients last 3 weights. If a weight gain of >10 lbs exists, you may refill the prescription once after instructing the patient to schedule an appointment within the next 30 days.    Wt Readings from Last 3 Encounters:   12/01/22 50.3 kg (110 lb 12.8 oz)   11/02/22 50.3 kg (110 lb 12.8 oz)   10/26/22 51.5 kg (113 lb 9.6 oz)             Passed - Medication is active on med list        Passed - Patient is not pregnant        Passed - No positve " "pregnancy test on file in past 12 months        Passed - Recent (6 mo) or future (30 days) visit within the authorizing provider's specialty     Patient had office visit in the last 6 months or has a visit in the next 30 days with authorizing provider or within the authorizing provider's specialty.  See \"Patient Info\" tab in inbasket, or \"Choose Columns\" in Meds & Orders section of the refill encounter.                 Leonardo Colbert RN 12/21/22 3:31 PM  "

## 2022-12-21 NOTE — TELEPHONE ENCOUNTER
"Last Written Prescription Date:  11/30/22  Last Fill Quantity: 30,  # refills: 0   Last office visit provider:  12/1/22     Last Written Prescription Date:  2/1/22  Last Fill Quantity: 30/60,  # refills: 0   Last office visit provider:  12/1/22    Requested Prescriptions   Pending Prescriptions Disp Refills     levETIRAcetam (KEPPRA) 750 MG tablet [Pharmacy Med Name: LEVETIRACETAM 750MG TABLETS] 60 tablet 0     Sig: TAKE 1 TABLET(750 MG) BY MOUTH TWICE DAILY       There is no refill protocol information for this order        FEROSUL 325 (65 Fe) MG tablet [Pharmacy Med Name: FERROUS SULFATE 325MG (5GR) TABS] 30 tablet 0     Sig: TAKE 1 TABLET(325 MG) BY MOUTH DAILY WITH BREAKFAST       Iron Supplements Passed - 12/20/2022 11:21 AM        Passed - Patient is 12 years of age or older        Passed - Recent (12 mo) or future (30 days) visit within the authorizing provider's specialty     Patient has had an office visit with the authorizing provider or a provider within the authorizing providers department within the previous 12 mos or has a future within next 30 days. See \"Patient Info\" tab in inbasket, or \"Choose Columns\" in Meds & Orders section of the refill encounter.              Passed - Hgb OR Hct on record within the past 12 mos.     Patient need only have had a HGB or HCT on file in the past 12 mos. That result does not need to be normal.    Recent Labs   Lab Test 12/01/22  1442 10/10/22  0712 09/23/22  0837   HGB 11.8 9.9* 10.5*     Recent Labs   Lab Test 12/01/22  1442 10/10/22  0712 09/23/22  0837   HCT 39.3 32.7* 33.5*       Please verify a HGB or HCT has been checked SINCE THE LAST DOSE CHANGE.            Passed - Medication is active on med list           meclizine (ANTIVERT) 12.5 MG tablet [Pharmacy Med Name: MECLIZINE 12.5MG (RX) TABLETS] 60 tablet 0     Sig: TAKE 1 TABLET(12.5 MG) BY MOUTH TWICE DAILY AS NEEDED FOR DIZZINESS        Antivertigo/Antiemetic Agents Passed - 12/20/2022 11:21 AM        " "Passed - Recent (12 mo) or future (30 days) visit within the authorizing provider's specialty     Patient has had an office visit with the authorizing provider or a provider within the authorizing providers department within the previous 12 mos or has a future within next 30 days. See \"Patient Info\" tab in inbasket, or \"Choose Columns\" in Meds & Orders section of the refill encounter.              Passed - Medication is active on med list        Passed - Patient is 18 years of age or older           QUEtiapine (SEROQUEL) 50 MG tablet [Pharmacy Med Name: QUETIAPINE 50MG  TABLETS] 30 tablet 0     Sig: TAKE 1 TABLET(50 MG) BY MOUTH AT BEDTIME       Antipsychotic Medications Passed - 12/21/2022  3:28 PM        Passed - Blood pressure under 140/90 in past 12 months     BP Readings from Last 3 Encounters:   12/01/22 117/60   11/02/22 104/69   10/26/22 101/67                 Passed - Patient is 12 years of age or older        Passed - Lipid panel on file within the past 12 months     Recent Labs   Lab Test 05/19/22  1430   CHOL 247*   TRIG 105   HDL 77   *               Passed - CBC on file in past 12 months     Recent Labs   Lab Test 12/01/22  1442   WBC 5.3   RBC 4.92   HGB 11.8   HCT 39.3                    Passed - Heart Rate on file within past 12 months     Pulse Readings from Last 3 Encounters:   12/01/22 100   11/02/22 73   10/26/22 78               Passed - A1c or Glucose on file in past 12 months     Recent Labs   Lab Test 12/01/22  1442 07/20/22  0923 07/19/22  1901   *   < > 252*   A1C  --   --  5.6    < > = values in this interval not displayed.       Please review patients last 3 weights. If a weight gain of >10 lbs exists, you may refill the prescription once after instructing the patient to schedule an appointment within the next 30 days.    Wt Readings from Last 3 Encounters:   12/01/22 50.3 kg (110 lb 12.8 oz)   11/02/22 50.3 kg (110 lb 12.8 oz)   10/26/22 51.5 kg (113 lb 9.6 oz)      " "       Passed - Medication is active on med list        Passed - Patient is not pregnant        Passed - No positve pregnancy test on file in past 12 months        Passed - Recent (6 mo) or future (30 days) visit within the authorizing provider's specialty     Patient had office visit in the last 6 months or has a visit in the next 30 days with authorizing provider or within the authorizing provider's specialty.  See \"Patient Info\" tab in inbasket, or \"Choose Columns\" in Meds & Orders section of the refill encounter.                 Leonardo Colbert RN 12/21/22 3:29 PM  "

## 2022-12-22 ENCOUNTER — TELEPHONE (OUTPATIENT)
Dept: FAMILY MEDICINE | Facility: CLINIC | Age: 77
End: 2022-12-22

## 2022-12-22 DIAGNOSIS — G50.0 TRIGEMINAL NEURALGIA: ICD-10-CM

## 2022-12-22 DIAGNOSIS — G43.009 MIGRAINE WITHOUT AURA AND WITHOUT STATUS MIGRAINOSUS, NOT INTRACTABLE: ICD-10-CM

## 2022-12-22 RX ORDER — LEVETIRACETAM 750 MG/1
750 TABLET ORAL 2 TIMES DAILY
Qty: 180 TABLET | Refills: 1 | Status: SHIPPED | OUTPATIENT
Start: 2022-12-22 | End: 2023-06-11

## 2023-01-05 ENCOUNTER — TELEPHONE (OUTPATIENT)
Dept: FAMILY MEDICINE | Facility: CLINIC | Age: 78
End: 2023-01-05

## 2023-01-07 ENCOUNTER — MEDICAL CORRESPONDENCE (OUTPATIENT)
Dept: HEALTH INFORMATION MANAGEMENT | Facility: CLINIC | Age: 78
End: 2023-01-07

## 2023-01-10 ENCOUNTER — TELEPHONE (OUTPATIENT)
Dept: FAMILY MEDICINE | Facility: CLINIC | Age: 78
End: 2023-01-10

## 2023-01-10 DIAGNOSIS — F41.9 ANXIETY: ICD-10-CM

## 2023-01-10 NOTE — TELEPHONE ENCOUNTER
Daughter called to report patient has increased facial nerve pain in the last several weeks. Patient has a long history of facial nerve pain    Migraine headache  Trigeminal neuralgia  Chronic pain syndrome    Daughter believes could be related to change in nortriptyline as there have been no other recent changes. Per medication list, decreased from 50mg twice daily to 25mg twice daily on 10/4/22. Daughter did not recall this dose change and has been giving patient 50mg until 12/16 when refill was sent in for 25mg. Since patient stated the new dose, her nerve pain has gradually increased.     Last OV with pcp on 12/1 indicated patient to take 25mg twice daily.     Routing to pcp to review if patient needs to resume 50mg twice daily.     Chitra Stoddard RN

## 2023-01-11 RX ORDER — NORTRIPTYLINE HYDROCHLORIDE 50 MG/1
50 CAPSULE ORAL 2 TIMES DAILY
Qty: 180 CAPSULE | Refills: 3 | Status: ON HOLD | OUTPATIENT
Start: 2023-01-11 | End: 2023-07-06

## 2023-01-11 NOTE — TELEPHONE ENCOUNTER
Writer updated pt's daughter, Alissa, with PCP's recommendation. Daughter denied further questions or concerns.    Flaquita Tee RN

## 2023-01-12 ENCOUNTER — MEDICAL CORRESPONDENCE (OUTPATIENT)
Dept: HEALTH INFORMATION MANAGEMENT | Facility: CLINIC | Age: 78
End: 2023-01-12

## 2023-01-12 DIAGNOSIS — Z53.9 DIAGNOSIS NOT YET DEFINED: Primary | ICD-10-CM

## 2023-01-12 PROCEDURE — G0179 MD RECERTIFICATION HHA PT: HCPCS | Performed by: FAMILY MEDICINE

## 2023-01-16 ENCOUNTER — MEDICAL CORRESPONDENCE (OUTPATIENT)
Dept: HEALTH INFORMATION MANAGEMENT | Facility: CLINIC | Age: 78
End: 2023-01-16

## 2023-01-17 ENCOUNTER — TELEPHONE (OUTPATIENT)
Dept: FAMILY MEDICINE | Facility: CLINIC | Age: 78
End: 2023-01-17

## 2023-01-17 NOTE — TELEPHONE ENCOUNTER
The Home Care/Assisted Living/Nursing Facility is calling regarding an established patient.  Has the patient seen Home Care in the past or is currently residing in Assisted Living or Nursing Facility? Yes.     Sharron calling from [facility] requesting the following orders that are within the Home Care, Assisted Living or Nursing Home Eval and Treatment standing order and can be signed as standing order signature required by RN.    Preferred Call Back Number: [phone]        Any additional Orders:  Are there any orders requested, not stated above, that are outside of the standing order and must be routed to a licensed practitioner for approval?    Yes: Behavior health visits  X 4    After visit recert for behavior health    Writer has verified [employer] will send fax to have orders signed.    ALSO: Pain 9-10/10 with TMJ flare, appointment made for Monday when daughter can bring her.      ABRIL Love  [facility]

## 2023-01-18 ENCOUNTER — MEDICAL CORRESPONDENCE (OUTPATIENT)
Dept: HEALTH INFORMATION MANAGEMENT | Facility: CLINIC | Age: 78
End: 2023-01-18

## 2023-01-18 NOTE — TELEPHONE ENCOUNTER
Left voice message for callback to [phone].  Voicemail came up as Lakesha Dawson.  Please advise [facility] of Dr. Murillo approval of orders.

## 2023-01-18 NOTE — TELEPHONE ENCOUNTER
Approve all requested orders     Mara Murillo MD 1/18/2023 9:16 AM   [facility].  [phone]

## 2023-01-20 ENCOUNTER — MYC REFILL (OUTPATIENT)
Dept: FAMILY MEDICINE | Facility: CLINIC | Age: 78
End: 2023-01-20
Payer: COMMERCIAL

## 2023-01-20 DIAGNOSIS — G43.009 MIGRAINE WITHOUT AURA AND WITHOUT STATUS MIGRAINOSUS, NOT INTRACTABLE: ICD-10-CM

## 2023-01-20 DIAGNOSIS — G50.0 TRIGEMINAL NEURALGIA: ICD-10-CM

## 2023-01-22 NOTE — TELEPHONE ENCOUNTER
Per daughter pt will be out of medication tomorrow 1/23- provider will be out due to jury duty for pt's appt that was scheduled tomorrow, hoping covering can assist with this stat-          acetaminophen-codeine (TYLENOL #3) 300-30 MG tablet      see below message

## 2023-01-24 ENCOUNTER — TELEPHONE (OUTPATIENT)
Dept: FAMILY MEDICINE | Facility: CLINIC | Age: 78
End: 2023-01-24

## 2023-01-24 NOTE — TELEPHONE ENCOUNTER
Sharron, [facility], patient had appointment this Wednesday but Provider had to cancel.    Daughter rescheduled to 2/1.    Pain, jaw area, barely eatting 10/10, crying.  Has to go lay down, is affecting her sleep.    120/60, dizzy,     Did not want to do anything d/t pain    [phone]

## 2023-01-24 NOTE — TELEPHONE ENCOUNTER
"RN called patients daughter to follow up on phone call from patients home care nurse earlier.    Daughter states that patient is eating just fine. \"like just fine\".  States that today patient she went \"off on the nurse\".  Daughter says she is up and eating, and functioning.    Picky about eating, mostly just eats cream of broccoli soup. She eats the whole bowl of soup. Eats a lot. And eats it all as soon as the nurse left.    On stool softeners x 2 and only eating soup, yet daughter states she is still very constipated, sits on toilet at night a pushes hard and it hurts her jaw.    In reference to 10/10 pain, Daughter states she is not in \"that kind of pain\", \"I am not saying that she is not in pain.\" The last few weeks it has been acting up.     Sleeps more due to no one being there during the day, sleeps d/t not all to of activity in the house. Then is up all night.    Says she only sleeps on the couch.    Tylenol 3 takes 1 every 6 hours, pain has typically controlled this pain BUT, 1 every 6 hours wears off pretty quickly when the pain is high like this.    \"If it is really that bad we would really bring her in.\"    Daughter says she is gaining weight.    Daughter says that patient has a history on non-compliance and has said she does not want ot get a CT or go to a neurologist.    RN encouraged the RN triage line for any concerns, also to call the clinic if pain became worse or if pain extreme to take patient to the ED.    Patient was rescheduled for   2/1/2023 12:00 PM (Arrive by 11:40 AM) Mara Murillo MD Rhini, RN  [facility]  "

## 2023-01-26 ENCOUNTER — TELEPHONE (OUTPATIENT)
Dept: FAMILY MEDICINE | Facility: CLINIC | Age: 78
End: 2023-01-26
Payer: COMMERCIAL

## 2023-01-26 NOTE — TELEPHONE ENCOUNTER
Reason for call:  [facility] called they need a printed signature for home care order [id number]    This was sent over x 2 with illegable provider signature. Home Care cannot take illegible signatures.    This will need to be fixed and refaxed.    Order date is 1/12/2023 was faxed on 1/13 and 1/25 back to [facility]    Please reprint from right fax and print name under signature and fax back to [facility]    ABRIL Love  [facility]

## 2023-02-01 ENCOUNTER — OFFICE VISIT (OUTPATIENT)
Dept: FAMILY MEDICINE | Facility: CLINIC | Age: 78
End: 2023-02-01
Payer: COMMERCIAL

## 2023-02-01 ENCOUNTER — MEDICAL CORRESPONDENCE (OUTPATIENT)
Dept: HEALTH INFORMATION MANAGEMENT | Facility: CLINIC | Age: 78
End: 2023-02-01

## 2023-02-01 VITALS
HEIGHT: 65 IN | WEIGHT: 108.56 LBS | DIASTOLIC BLOOD PRESSURE: 59 MMHG | SYSTOLIC BLOOD PRESSURE: 133 MMHG | OXYGEN SATURATION: 93 % | HEART RATE: 86 BPM | BODY MASS INDEX: 18.09 KG/M2

## 2023-02-01 DIAGNOSIS — G43.009 MIGRAINE WITHOUT AURA AND WITHOUT STATUS MIGRAINOSUS, NOT INTRACTABLE: ICD-10-CM

## 2023-02-01 DIAGNOSIS — G50.0 TRIGEMINAL NEURALGIA: ICD-10-CM

## 2023-02-01 DIAGNOSIS — G89.4 CHRONIC PAIN SYNDROME: Primary | ICD-10-CM

## 2023-02-01 PROBLEM — N17.9 AKI (ACUTE KIDNEY INJURY) (H): Status: RESOLVED | Noted: 2022-07-19 | Resolved: 2023-02-01

## 2023-02-01 PROBLEM — G06.0 CEREBRAL ABSCESS: Status: RESOLVED | Noted: 2022-07-27 | Resolved: 2023-02-01

## 2023-02-01 PROBLEM — G04.90 ENCEPHALITIS: Status: RESOLVED | Noted: 2022-07-27 | Resolved: 2023-02-01

## 2023-02-01 PROBLEM — A41.9 SEPSIS, DUE TO UNSPECIFIED ORGANISM, UNSPECIFIED WHETHER ACUTE ORGAN DYSFUNCTION PRESENT (H): Status: RESOLVED | Noted: 2022-07-19 | Resolved: 2023-02-01

## 2023-02-01 PROBLEM — A41.9 SEPSIS (H): Status: RESOLVED | Noted: 2022-07-19 | Resolved: 2023-02-01

## 2023-02-01 PROCEDURE — 99214 OFFICE O/P EST MOD 30 MIN: CPT | Performed by: FAMILY MEDICINE

## 2023-02-01 RX ORDER — GABAPENTIN 100 MG/1
200 CAPSULE ORAL 3 TIMES DAILY
Qty: 540 CAPSULE | Refills: 4 | Status: SHIPPED | OUTPATIENT
Start: 2023-02-01 | End: 2023-05-29

## 2023-02-01 NOTE — PATIENT INSTRUCTIONS
We are planning to gradually increase gabapentin to upto 300 mg three times per day    To better control the pain   Refill on tylenol #3 done today   Advised to call back directly if there are further questions, or if these symptoms fail to improve as anticipated or worsen.     Mara Murillo MD

## 2023-02-01 NOTE — PROGRESS NOTES
Assessment & Plan     Patient presents with:  TMJ: Flare up, has been in pain as of recently on left side of her face. Takes Tylenol 3 for this, but would like more of these per prescription.        Julisa was seen today for tmj.    Diagnoses and all orders for this visit:    Chronic pain syndrome  Comments:  worsening of her pain level since discharge from hospital , taking 4 tylenol #3 per day, will gradual increase neurontine dose  Orders:  -     gabapentin (NEURONTIN) 100 MG capsule; Take 2 capsules (200 mg) by mouth 3 times daily for 90 days    Trigeminal neuralgia  -     gabapentin (NEURONTIN) 100 MG capsule; Take 2 capsules (200 mg) by mouth 3 times daily for 90 days  -     acetaminophen-codeine (TYLENOL #3) 300-30 MG tablet; Take 1 tablet by mouth every 6 hours as needed for breakthrough pain or severe pain (7-10)    Migraine without aura and without status migrainosus, not intractable  -     gabapentin (NEURONTIN) 100 MG capsule; Take 2 capsules (200 mg) by mouth 3 times daily for 90 days  -     acetaminophen-codeine (TYLENOL #3) 300-30 MG tablet; Take 1 tablet by mouth every 6 hours as needed for breakthrough pain or severe pain (7-10)       Patient Instructions   We are planning to gradually increase gabapentin to upto 300 mg three times per day    To better control the pain   Refill on tylenol #3 done today   Advised to call back directly if there are further questions, or if these symptoms fail to improve as anticipated or worsen.     Mara Murillo MD          No follow-ups on file.      Subjective   Julisa Chaney 77 year old who presents for the following health issues     HPI   Answers for HPI/ROS submitted by the patient on 2/1/2023  What is the reason for your visit today? : med refill and question about dizzyness  How many servings of fruits and vegetables do you eat daily?: 2-3  On average, how many sweetened beverages do you drink each day (Examples: soda, juice, sweet tea, etc.  Do NOT count  diet or artificially sweetened beverages)?: 0  How many minutes a day do you exercise enough to make your heart beat faster?: 9 or less  How many days a week do you exercise enough to make your heart beat faster?: 3 or less  How many days per week do you miss taking your medication?: 0    Wt Readings from Last 3 Encounters:   02/01/23 49.2 kg (108 lb 9 oz)   12/01/22 50.3 kg (110 lb 12.8 oz)   11/02/22 50.3 kg (110 lb 12.8 oz)     Pain History:  When did you first notice your pain? - Chronic Pain   Have you seen this provider for your pain in the past?   Yes   Where in your body do you have pain? Left side of the face from trigeminal neuralgia   Are you seeing anyone else for your pain? Yes - physical therapy , home based     PHQ-9 SCORE 2/17/2022 5/19/2022 12/1/2022   PHQ-9 Total Score MyChart - 15 (Moderately severe depression) 0   PHQ-9 Total Score 15 15 0       YUE-7 SCORE 1/6/2020 6/15/2020   Total Score 7 7         PDMP Review       Value Time User    State PDMP site checked  Yes 2/1/2023  2:07 PM Mara Murillo MD        Last CSA Agreement:   CSA -- Patient Level:     [Media Unavailable] Controlled Substance Agreement - Opioid - Scan on 7/11/2018   [Media Unavailable] Controlled Substance Agreement - Opioid - Scan on 9/12/2016       Last UDS: 2/21/2022        PHQ Assesment Total Score(s) 12/1/2022   PHQ-9 Score 0   Some recent data might be hidden         Patient Active Problem List   Diagnosis     Osteopenia     Hyperlipidemia     Migraine headache     Trigeminal neuralgia     Counseling regarding advanced directives and goals of care     Controlled substance agreement signed     Hypercalcemia     Chronic pain syndrome     Iron deficiency anemia due to chronic blood loss     Abnormal chest CT     Mild major depression (H)     Pyloric ulcer, chronic     Infection due to 2019 novel coronavirus     Pyogenic brain abscess        Current Outpatient Medications   Medication     acetaminophen-codeine (TYLENOL #3)  "300-30 MG tablet     gabapentin (NEURONTIN) 100 MG capsule     desonide (DESOWEN) 0.05 % cream     ferrous sulfate (FEROSUL) 325 (65 Fe) MG tablet     levETIRAcetam (KEPPRA) 750 MG tablet     Lidocaine (LIDOCARE) 4 % Patch     meclizine (ANTIVERT) 12.5 MG tablet     mirtazapine (REMERON) 15 MG tablet     Multiple Vitamin (MULTIVITAMIN) TABS     nortriptyline (PAMELOR) 50 MG capsule     omeprazole (PRILOSEC) 40 MG DR capsule     OXcarbazepine (TRILEPTAL) 150 MG tablet     polyvinyl alcohol (ARTIFICIAL TEARS) 1.4 % ophthalmic solution     QUEtiapine (SEROQUEL) 50 MG tablet     senna-docusate (SENOKOT-S/PERICOLACE) 8.6-50 MG tablet     topiramate (TOPAMAX) 50 MG tablet     Vitamin D3 50 mcg (2000 units) tablet     No current facility-administered medications for this visit.          Social Determinants of Health     Tobacco Use: Medium Risk     Smoking Tobacco Use: Former     Smokeless Tobacco Use: Never     Passive Exposure: Not on file   Alcohol Use: Not on file   Financial Resource Strain: Not on file   Food Insecurity: Not on file   Transportation Needs: Not on file   Physical Activity: Not on file   Stress: Not on file   Social Connections: Not on file   Intimate Partner Violence: Not on file   Depression: Not at risk     PHQ-2 Score: 0   Housing Stability: Not on file        Review of Systems   Constitutional, HEENT, cardiovascular, pulmonary, GI, , musculoskeletal, neuro, skin, endocrine and psych systems are negative, except as otherwise noted.      Objective    /59 (BP Location: Left arm, Patient Position: Sitting, Cuff Size: Adult Regular)   Pulse 86   Ht 1.651 m (5' 5\")   Wt 49.2 kg (108 lb 9 oz)   SpO2 93%   BMI 18.07 kg/m     LMP 06/01/2011   There is no height or weight on file to calculate BMI.  Physical Exam   GENERAL: healthy, alert and no distress  NECK: no adenopathy, no asymmetry, masses, or scars and thyroid normal to palpation  RESP: lungs clear to auscultation - no rales, rhonchi or " wheezes  CV: regular rate and rhythm, normal S1 S2, no S3 or S4, no murmur, click or rub, no peripheral edema and peripheral pulses strong  MS: no gross musculoskeletal defects noted, no edema    Mara Murillo MD   [facility].  [phone]

## 2023-02-08 ENCOUNTER — MEDICAL CORRESPONDENCE (OUTPATIENT)
Dept: HEALTH INFORMATION MANAGEMENT | Facility: CLINIC | Age: 78
End: 2023-02-08

## 2023-02-14 DIAGNOSIS — F32.0 CURRENT MILD EPISODE OF MAJOR DEPRESSIVE DISORDER WITHOUT PRIOR EPISODE (H): ICD-10-CM

## 2023-02-15 NOTE — TELEPHONE ENCOUNTER
Routing refill request to provider for review/approval because:  Drug interaction warning HIGH Kate RN  [facility]

## 2023-02-16 RX ORDER — MIRTAZAPINE 15 MG/1
TABLET, FILM COATED ORAL
Qty: 30 TABLET | Refills: 2 | Status: SHIPPED | OUTPATIENT
Start: 2023-02-16 | End: 2023-05-20

## 2023-03-03 ENCOUNTER — MEDICAL CORRESPONDENCE (OUTPATIENT)
Dept: HEALTH INFORMATION MANAGEMENT | Facility: CLINIC | Age: 78
End: 2023-03-03

## 2023-03-06 ENCOUNTER — TELEPHONE (OUTPATIENT)
Dept: FAMILY MEDICINE | Facility: CLINIC | Age: 78
End: 2023-03-06
Payer: COMMERCIAL

## 2023-03-06 DIAGNOSIS — R41.3 MEMORY CHANGES: Primary | ICD-10-CM

## 2023-03-06 NOTE — TELEPHONE ENCOUNTER
Order/Referral Request    Who is requesting: Kelin - Physical Therapist - [facility]    Orders being requested: Physical therapy for 1 time every other week for 4 visits.    Also requesting an Referral for OT evaluation - for Cognitive Testing    When are orders needed by: next week    Verbal orders can be called to Kelin at [phone] - detailed message okay.

## 2023-03-08 ENCOUNTER — MEDICAL CORRESPONDENCE (OUTPATIENT)
Dept: HEALTH INFORMATION MANAGEMENT | Facility: CLINIC | Age: 78
End: 2023-03-08

## 2023-03-08 NOTE — TELEPHONE ENCOUNTER
Referral for occupational therapy placed to help with the memory testing  Also okay for all physical therapy request per nursing note    Mara Murillo MD

## 2023-03-08 NOTE — TELEPHONE ENCOUNTER
Left detailed message for Kelin per note that all orders are OK per Dr. Murillo and she placed the referral for OT.  Miracle Samayoa LPN

## 2023-03-13 ENCOUNTER — TELEPHONE (OUTPATIENT)
Dept: FAMILY MEDICINE | Facility: CLINIC | Age: 78
End: 2023-03-13
Payer: COMMERCIAL

## 2023-03-13 NOTE — TELEPHONE ENCOUNTER
Order/Referral Request    Who is requesting: [facility]    Orders being requested: OT 1 visit every other week for 3 visits to address cognition and ADL    Reason service is needed/diagnosis: N/A    When are orders needed by: ASAP    Has this been discussed with Provider: No    Does patient have a preference on a Group/Provider/Facility? N/A    Does patient have an appointment scheduled?: No    Where to send orders: Place orders within Epic    Could we send this information to you in [facility] or would you prefer to receive a phone call?:   No preference   Okay to leave a detailed message?: Yes at Other phone number:  [phone]

## 2023-03-15 ENCOUNTER — MEDICAL CORRESPONDENCE (OUTPATIENT)
Dept: HEALTH INFORMATION MANAGEMENT | Facility: CLINIC | Age: 78
End: 2023-03-15

## 2023-03-22 ENCOUNTER — MYC REFILL (OUTPATIENT)
Dept: FAMILY MEDICINE | Facility: CLINIC | Age: 78
End: 2023-03-22
Payer: COMMERCIAL

## 2023-03-22 DIAGNOSIS — G50.0 TRIGEMINAL NEURALGIA: ICD-10-CM

## 2023-03-22 DIAGNOSIS — G43.009 MIGRAINE WITHOUT AURA AND WITHOUT STATUS MIGRAINOSUS, NOT INTRACTABLE: ICD-10-CM

## 2023-04-20 ENCOUNTER — PATIENT OUTREACH (OUTPATIENT)
Dept: CARE COORDINATION | Facility: CLINIC | Age: 78
End: 2023-04-20
Payer: COMMERCIAL

## 2023-04-28 ENCOUNTER — MEDICAL CORRESPONDENCE (OUTPATIENT)
Dept: HEALTH INFORMATION MANAGEMENT | Facility: CLINIC | Age: 78
End: 2023-04-28
Payer: COMMERCIAL

## 2023-04-28 DIAGNOSIS — G43.719 INTRACTABLE CHRONIC MIGRAINE WITHOUT AURA AND WITHOUT STATUS MIGRAINOSUS: ICD-10-CM

## 2023-04-28 RX ORDER — TOPIRAMATE 50 MG/1
TABLET, FILM COATED ORAL
Qty: 90 TABLET | Refills: 3 | Status: SHIPPED | OUTPATIENT
Start: 2023-04-28 | End: 2023-07-21

## 2023-04-28 NOTE — TELEPHONE ENCOUNTER
"Topiramate noted for continuation per last OV chart notes of 2/1/2023.  Last Written Prescription Date:  4/28/2022  Last Fill Quantity: 90,  # refills: 4   Last office visit provider:  2/1/2023     Requested Prescriptions   Pending Prescriptions Disp Refills     topiramate (TOPAMAX) 50 MG tablet [Pharmacy Med Name: TOPIRAMATE 50MG TABLETS] 90 tablet 4     Sig: TAKE 1 TABLET(50 MG) BY MOUTH AT BEDTIME       Anti-Seizure Meds Protocol  Failed - 4/28/2023  9:30 AM        Failed - Review Authorizing provider's last note.      Refer to last progress notes: confirm request is for original authorizing provider (cannot be through other providers).          Passed - Recent (12 mo) or future (30 days) visit within the authorizing provider's specialty     Patient has had an office visit with the authorizing provider or a provider within the authorizing providers department within the previous 12 mos or has a future within next 30 days. See \"Patient Info\" tab in inbasket, or \"Choose Columns\" in Meds & Orders section of the refill encounter.              Passed - Normal CBC on file in past 26 months     Recent Labs   Lab Test 12/01/22  1442   WBC 5.3   RBC 4.92   HGB 11.8   HCT 39.3                    Passed - Normal serum creatinine on file in past 26 months     Recent Labs   Lab Test 12/01/22  1442   CR 0.60       Ok to refill medication if creatinine is low          Passed - Normal ALT or AST on file in past 26 months     Recent Labs   Lab Test 09/22/22  1255   ALT 24     Recent Labs   Lab Test 09/22/22  1255   AST 17             Passed - Normal platelet count on file in past 26 months     Recent Labs   Lab Test 12/01/22  1442                  Passed - Medication is active on med list        Passed - No active pregnancy on record        Passed - No positive pregnancy test in last 12 months             Zaria Castellon RN 04/28/23 2:58 PM  "

## 2023-05-01 ENCOUNTER — MYC REFILL (OUTPATIENT)
Dept: FAMILY MEDICINE | Facility: CLINIC | Age: 78
End: 2023-05-01
Payer: COMMERCIAL

## 2023-05-01 DIAGNOSIS — G50.0 TRIGEMINAL NEURALGIA: ICD-10-CM

## 2023-05-01 DIAGNOSIS — G43.009 MIGRAINE WITHOUT AURA AND WITHOUT STATUS MIGRAINOSUS, NOT INTRACTABLE: ICD-10-CM

## 2023-05-03 ENCOUNTER — HOSPITAL ENCOUNTER (EMERGENCY)
Facility: HOSPITAL | Age: 78
Discharge: HOME | End: 2023-05-03
Attending: EMERGENCY MEDICINE | Admitting: EMERGENCY MEDICINE
Payer: COMMERCIAL

## 2023-05-03 ENCOUNTER — MEDICAL CORRESPONDENCE (OUTPATIENT)
Dept: HEALTH INFORMATION MANAGEMENT | Facility: CLINIC | Age: 78
End: 2023-05-03
Payer: COMMERCIAL

## 2023-05-03 VITALS
OXYGEN SATURATION: 94 % | HEART RATE: 74 BPM | SYSTOLIC BLOOD PRESSURE: 115 MMHG | DIASTOLIC BLOOD PRESSURE: 55 MMHG | TEMPERATURE: 98 F | RESPIRATION RATE: 16 BRPM | WEIGHT: 108 LBS | BODY MASS INDEX: 17.97 KG/M2

## 2023-05-03 DIAGNOSIS — G50.0 TRIGEMINAL NEURALGIA: ICD-10-CM

## 2023-05-03 PROCEDURE — 99282 EMERGENCY DEPT VISIT SF MDM: CPT

## 2023-05-03 RX ORDER — GABAPENTIN 100 MG/1
100 CAPSULE ORAL 3 TIMES DAILY
Qty: 10 CAPSULE | Refills: 0 | Status: SHIPPED | OUTPATIENT
Start: 2023-05-03 | End: 2023-06-01

## 2023-05-04 ENCOUNTER — PATIENT OUTREACH (OUTPATIENT)
Dept: CARE COORDINATION | Facility: CLINIC | Age: 78
End: 2023-05-04
Payer: COMMERCIAL

## 2023-05-04 NOTE — DISCHARGE INSTRUCTIONS
Take 300 mg gabapentin at night for the next 5 days and continue taking 200 mg gabapentin during the day.  Return to the emergency department if you develop fevers or inability to tolerate liquids.

## 2023-05-04 NOTE — ED TRIAGE NOTES
Pt has trigeminal neuralgia and this weekend the pain started getting worse. Pt used to take Tylenol #3 for pain but has stopped. Pt is on gabapentin with no relief.      Triage Assessment     Row Name 05/03/23 1950       Triage Assessment (Adult)    Airway WDL WDL       Respiratory WDL    Respiratory WDL WDL       Skin Circulation/Temperature WDL    Skin Circulation/Temperature WDL WDL       Cardiac WDL    Cardiac WDL WDL       Peripheral/Neurovascular WDL    Peripheral Neurovascular WDL WDL       Cognitive/Neuro/Behavioral WDL    Cognitive/Neuro/Behavioral WDL WDL

## 2023-05-04 NOTE — ED PROVIDER NOTES
EMERGENCY DEPARTMENT ENCOUNTER      NAME: Julisa Chaney  AGE: 78 year old female  YOB: 1945  MRN: [id number]  EVALUATION DATE & TIME: No admission date for patient encounter.    PCP: Mara Murillo    ED PROVIDER: Clementine Sands MD      Chief Complaint   Patient presents with     Facial Pain         FINAL IMPRESSION:  1. Trigeminal neuralgia        MEDICAL DECISION MAKIN:03 PM I met with the patient, obtained history, performed an initial exam, and discussed options and plan for diagnostics and treatment here in the ED.   Pertinent Labs & Imaging studies reviewed. (See chart for details)           Julisa Chaney is a 78 year old female who presents with left-sided facial pain.  Patient has a history of trigeminal neuralgia she usually takes 200 mg gabapentin twice daily but states over the last few days she has been having worsening of pain.  She does not like taking the gabapentin it causes her to be sleepy but if she does not take the medications then she does not drink enough fluids due to her pain.  Denies any fevers or chills.  No falls or trauma.  Patient well-appearing in the emergency department no fevers no sign for infection we will plan to discharge with an increase of gabapentin to 300 mg at night and follow-up with primary care doctor as scheduled on Monday next week.  Patient and daughter comfortable with discharge plan understands return precautions.       Medical Decision Making    History:    Supplemental history from: Caregiver and Family Member/Significant Other    External Record(s) reviewed: Documented in chart, if applicable.    Work Up:    Chart documentation includes differential considered and any EKGs or imaging independently interpreted by provider, where specified.    In additional to work up documented, I considered the following work up: Documented in chart, if applicable.    External consultation:    Discussion of management with another provider: Documented in  "chart, if applicable    Complicating factors:    Care impacted by chronic illness: Other: trigeminal neuralgia    Care affected by social determinants of health: N/A    Disposition considerations: Discharge. I prescribed additional prescription strength medication(s) as charted. See documentation for any additional details.          MEDICATIONS GIVEN IN THE EMERGENCY:  Medications - No data to display    NEW PRESCRIPTIONS STARTED AT TODAY'S ER VISIT  Discharge Medication List as of 5/3/2023  8:22 PM               =================================================================    HPI  Use of : N/A       Julisa Chaney is a 78 year old female who presents with left-sided facial pain.     Per Chart Review and confirmed with patient and patient family, the patient has a history of trigeminal neuralgia and chronic pain syndrome. Patient is on 200mg Gabapentin.    Per Family, patient takes 200mg gabapentin daily every night, and 100mg in the morning and an additional 100mg gabapentin in the afternoon, if the symptoms are very bad. They state that the the gabapentin causes the patient to slur her speech, become tremulous and overall \"doesn't help\". Of note the patient has been taking the 400mg daily for the past 4 days.     They explain this gabapentin plan has been helping the patient, but in the past week the symptoms have worsened and patient isn't eating or drinking as much as normal.  She does not have any decreased urine output or fevers.  No abdominal pain.   They deny any fever, chills, dysuria, vomiting, or abdominal pain.     Patient states Tylenol 3 provides relief, and she is currently taking 4 Tylenol 3 per day.    Family states that the patient has not gone back to neurologist. They were unable to make an appointment with the PCP, but have scheduled an appointment with another doctor on Monday 5/8.       REVIEW OF SYSTEMS   All other systems reviewed and are negative unless noted in " HPI.      PAST MEDICAL HISTORY:  Past Medical History:   Diagnosis Date     Aspiration pneumonia (H) 02/2022     Depression      High cholesterol      Migraines      Pyloric ulcer, chronic      Sepsis (H) 08/10/2020     Trigeminal neuralgia        PAST SURGICAL HISTORY:  Past Surgical History:   Procedure Laterality Date     HYSTERECTOMY       IR GASTROSTOMY TUBE PERCUTANEOUS PLCMNT  8/16/2022     PICC TRIPLE LUMEN PLACEMENT  7/22/2022          DE ESOPHAGOGASTRODUODENOSCOPY TRANSORAL DIAGNOSTIC N/A 8/13/2020    Procedure: ESOPHAGOGASTRODUODENOSCOPY (EGD);  Surgeon: Nathan Smalls MD;  Location: [facility];  Service: Gastroenterology     DE ESOPHAGOGASTRODUODENOSCOPY TRANSORAL DIAGNOSTIC N/A 8/14/2020    Procedure: ESOPHAGOGASTRODUODENOSCOPY (EGD), PYLORIC DILATION;  Surgeon: Nathan Smalls MD;  Location: [facility];  Service: Gastroenterology     VENTRICULOSTOMY Left 7/31/2022    Procedure: LEFT FRONTAL VENTRICULOSTOMY;  Surgeon: Brady Heredia MD;  Location: [facility] COLONOSCOPY W/WO BRUSH/WASH N/A 8/13/2020    Procedure: COLONOSCOPY WITH POLYPECTOMY;  Surgeon: Natahn Smalls MD;  Location: [facility];  Service: Gastroenterology       CURRENT MEDICATIONS:    gabapentin (NEURONTIN) 100 MG capsule  acetaminophen-codeine (TYLENOL #3) 300-30 MG tablet  desonide (DESOWEN) 0.05 % cream  ferrous sulfate (FEROSUL) 325 (65 Fe) MG tablet  gabapentin (NEURONTIN) 100 MG capsule  levETIRAcetam (KEPPRA) 750 MG tablet  Lidocaine (LIDOCARE) 4 % Patch  meclizine (ANTIVERT) 12.5 MG tablet  mirtazapine (REMERON) 15 MG tablet  Multiple Vitamin (MULTIVITAMIN) TABS  nortriptyline (PAMELOR) 50 MG capsule  omeprazole (PRILOSEC) 40 MG DR capsule  OXcarbazepine (TRILEPTAL) 150 MG tablet  polyvinyl alcohol (ARTIFICIAL TEARS) 1.4 % ophthalmic solution  QUEtiapine (SEROQUEL) 50 MG tablet  senna-docusate (SENOKOT-S/PERICOLACE) 8.6-50 MG tablet  topiramate (TOPAMAX) 50 MG tablet  Vitamin D3 50 mcg (2000  units) tablet        ALLERGIES:  Allergies   Allergen Reactions     Contrast [Iohexol] Rash     Noticed during 2020 admission. Received IV contrast on      Chocolate Headache     Contrast Dye      Penicillins Rash       FAMILY HISTORY:  Family History   Problem Relation Age of Onset     Cancer Father      Testicular cancer Grandchild 17.00     Breast Cancer Mother      Breast Cancer Sister      Breast Cancer Maternal Aunt      Breast Cancer Sister        SOCIAL HISTORY:   Social History     Socioeconomic History     Marital status:    Tobacco Use     Smoking status: Former     Packs/day: 1.00     Years: 50.00     Pack years: 50.00     Types: Cigarettes     Quit date: 2014     Years since quittin.4     Smokeless tobacco: Never   Substance and Sexual Activity     Alcohol use: No     Drug use: No   Social History Narrative    Lives alone in the house, relay in TV dinner  [employer] help with house maintenance  Daughter lives close by.  Mara Murillo MD 2018 1:49 PM         VITALS:  Patient Vitals for the past 24 hrs:   BP Temp Temp src Pulse Resp SpO2 Weight   23 1949 115/55 98  F (36.7  C) Temporal 74 16 94 % 49 kg (108 lb)       PHYSICAL EXAM    VITAL SIGNS: /55   Pulse 74   Temp 98  F (36.7  C) (Temporal)   Resp 16   Wt 49 kg (108 lb)   SpO2 94%   BMI 17.97 kg/m    General Appearance: Alert, interactive appropriately, well groomed  Head:  Atraumatic  Eyes: EOMI  ENT:  MMM, no dental abscess, +tenderness on palpation over side of face  Cardio:  Regular rate and rhythm  Pulm:  Clear to auscultation bilaterally, respirations unlabored with no accessory muscle use  Abdomen:  Soft NT/ND  Skin: No signs of trauma      LAB:  All pertinent labs reviewed and interpreted.  Labs Ordered and Resulted from Time of ED Arrival to Time of ED Departure - No data to display    RADIOLOGY:  No orders to display       EKG:   None    PROCEDURES:   None    Dorothy RAMOS, am serving as a scribe to  document services personally performed by Dr. Sands, based on my observation and the provider's statements to me. I, Dr. Sands, attest that Dorothy Lancaster is acting in a scribe capacity, has observed my performance of the services and has documented them in accordance with my direction.     Clementine Sands MD  Emergency Medicine  [facility] EMERGENCY DEPARTMENT  [address] [place] [id number]  [phone]  Dept: [phone]       Clementine Sands MD  05/03/23 [id number]

## 2023-05-05 ENCOUNTER — PATIENT OUTREACH (OUTPATIENT)
Dept: CARE COORDINATION | Facility: CLINIC | Age: 78
End: 2023-05-05
Payer: COMMERCIAL

## 2023-05-05 NOTE — LETTER
Julisa Chaney  [address] [place] [address]    Dear Julisa Chaney,      I am a team member within the Connected Care Resource Center with [facility]. I recently tried to reach you to ensure you were doing well following a recent visit within our health system. I also wanted to take this chance to introduce Clinic Care Coordination.     Below is a description of Clinic Care Coordination and how this team can further assist you:       The Clinic Care Coordination team is made up of a Registered Nurse, , Financial Resource Worker, and a Community Health Worker who understand and can help navigate the health care system. The goal of clinic care coordination is to help you manage your health, improve access to care, and achieve optimal health outcomes. They work alongside your provider to assist you in determining your health and social needs, obtain health care and community resources, and provide you with necessary information and education. Clinic Care Coordination can work with you through any barriers and develop a care plan that helps coordinate and strengthen the relationship between you and your care team.    If you wish to connect with the Clinic Care Coordination Team, please let your [facility] Primary Care Provider or Clinic Care Team know and they can place a referral. The Clinic Care Coordination team will then reach out by phone to further support you.    We are focused on providing you with the highest-quality healthcare experience possible.    Sincerely,   Your care team with [facility]

## 2023-05-05 NOTE — PROGRESS NOTES
[facility] Resource Center Contact  [facility]/Voicemail     Clinical Data: Care Coordination ED-sourced Outreach-     Outreach attempted x 2.  Left message on patient's voicemail, providing [facility]'s 24/7 scheduling and nurse triage phone number [phone] ([phone]) for questions/concerns and/or to schedule an appt with an [facility] provider.      Care Coordination introduction letter with explanation of Clinic Care Coordination services sent to patient via [facility]. Clinic Care Coordination services remain available via referral if needed.    Plan: [facility] will do no further outreaches at this time.       ANALISA Tena  Connected Care Resource [place], [facility]    *Connected Care Resource Team does NOT follow patient ongoing. Referrals are identified based on internal discharge reports and the outreach is to ensure patient has an understanding of their discharge instructions.

## 2023-05-19 ENCOUNTER — MYC REFILL (OUTPATIENT)
Dept: FAMILY MEDICINE | Facility: CLINIC | Age: 78
End: 2023-05-19
Payer: COMMERCIAL

## 2023-05-19 DIAGNOSIS — G50.0 TRIGEMINAL NEURALGIA: ICD-10-CM

## 2023-05-19 DIAGNOSIS — F32.0 CURRENT MILD EPISODE OF MAJOR DEPRESSIVE DISORDER WITHOUT PRIOR EPISODE (H): ICD-10-CM

## 2023-05-19 DIAGNOSIS — G89.4 CHRONIC PAIN SYNDROME: ICD-10-CM

## 2023-05-19 DIAGNOSIS — G43.009 MIGRAINE WITHOUT AURA AND WITHOUT STATUS MIGRAINOSUS, NOT INTRACTABLE: ICD-10-CM

## 2023-05-20 RX ORDER — GABAPENTIN 100 MG/1
200 CAPSULE ORAL 3 TIMES DAILY
Qty: 540 CAPSULE | Refills: 4 | OUTPATIENT
Start: 2023-05-20

## 2023-05-20 NOTE — TELEPHONE ENCOUNTER
"Routing refill request to provider for review/approval because:  Drug interaction warning    Last Written Prescription Date:  2/16/2023  Last Fill Quantity: 30,  # refills: 2   Last office visit provider:  2/1/2023     Requested Prescriptions   Pending Prescriptions Disp Refills     mirtazapine (REMERON) 15 MG tablet [Pharmacy Med Name: MIRTAZAPINE 15MG TABLETS] 30 tablet 2     Sig: TAKE 1 TABLET(15 MG) BY MOUTH AT BEDTIME       Atypical Antidepressants Protocol Passed - 5/19/2023  8:02 PM        Passed - Patient has PHQ-9 score less than 5 in past 6 months.     Please review last PHQ-9 score.           Passed - Medication active on med list        Passed - Patient is age 18 or older        Passed - No active pregnancy on record        Passed - No positive pregnancy test in past 12 mos        Passed - Recent (6 mo) or future (30 days) visit within the authorizing provider's specialty     Patient had office visit in the last 6 months or has a visit in the next 30 days with authorizing provider or within the authorizing provider's specialty.  See \"Patient Info\" tab in inbasket, or \"Choose Columns\" in Meds & Orders section of the refill encounter.                 Ioana Toscano RN 05/20/23 2:42 PM  "

## 2023-05-20 NOTE — TELEPHONE ENCOUNTER
Refill too soon. Pt should have refills on file.      Disp Refills Start End RAZ   gabapentin (NEURONTIN) 100 MG capsule 540 capsule 4 2/1/2023 5/2/2023 No   Sig - Route: Take 2 capsules (200 mg) by mouth 3 times daily for 90 days - Oral         Requested Prescriptions   Pending Prescriptions Disp Refills     gabapentin (NEURONTIN) 100 MG capsule 540 capsule 4     Sig: Take 2 capsules (200 mg) by mouth 3 times daily       There is no refill protocol information for this order          Shelia Nj RN 05/20/23 2:09 PM

## 2023-05-21 RX ORDER — MIRTAZAPINE 15 MG/1
TABLET, FILM COATED ORAL
Qty: 30 TABLET | Refills: 2 | Status: SHIPPED | OUTPATIENT
Start: 2023-05-21 | End: 2023-07-21

## 2023-05-29 ENCOUNTER — MYC REFILL (OUTPATIENT)
Dept: FAMILY MEDICINE | Facility: CLINIC | Age: 78
End: 2023-05-29
Payer: COMMERCIAL

## 2023-05-29 DIAGNOSIS — G43.009 MIGRAINE WITHOUT AURA AND WITHOUT STATUS MIGRAINOSUS, NOT INTRACTABLE: ICD-10-CM

## 2023-05-29 DIAGNOSIS — G89.4 CHRONIC PAIN SYNDROME: ICD-10-CM

## 2023-05-29 DIAGNOSIS — G50.0 TRIGEMINAL NEURALGIA: ICD-10-CM

## 2023-05-30 RX ORDER — GABAPENTIN 100 MG/1
200 CAPSULE ORAL 3 TIMES DAILY
Qty: 540 CAPSULE | Refills: 4 | Status: SHIPPED | OUTPATIENT
Start: 2023-05-30 | End: 2023-06-01

## 2023-05-30 RX ORDER — ACETAMINOPHEN AND CODEINE PHOSPHATE 300; 30 MG/1; MG/1
1 TABLET ORAL EVERY 6 HOURS PRN
Qty: 120 TABLET | Refills: 0 | Status: ON HOLD | OUTPATIENT
Start: 2023-05-30 | End: 2023-07-06

## 2023-05-30 NOTE — TELEPHONE ENCOUNTER
Routing refill request to provider for review/approval because:  Drug not on the [facility] refill protocol   Drug not on the [facility] refill protocol - Controlled Substance Request.     Pharmacy requesting a different dose for Gabapentin, see current and previous order.     gabapentin   Last Written Prescription Date:  05/03/2023  Last Fill Quantity: 10 tabs,  # refills: 0   Last office visit provider:      acetaminophen-codeine (TYLENOL #3)  Last Written Prescription Date:  05/02/2023  Last Fill Quantity: 120 tabs,  # refills: 0   Last office visit provider:  02/01/2023    Requested Prescriptions   Pending Prescriptions Disp Refills     gabapentin (NEURONTIN) 100 MG capsule 540 capsule 4     Sig: Take 2 capsules (200 mg) by mouth 3 times daily       There is no refill protocol information for this order        acetaminophen-codeine (TYLENOL #3) 300-30 MG tablet 120 tablet 0     Sig: Take 1 tablet by mouth every 6 hours as needed for breakthrough pain or severe pain       There is no refill protocol information for this order          Tata Emerson RN 05/29/23 9:01 PM

## 2023-06-01 ENCOUNTER — MYC MEDICAL ADVICE (OUTPATIENT)
Dept: FAMILY MEDICINE | Facility: CLINIC | Age: 78
End: 2023-06-01

## 2023-06-01 ENCOUNTER — OFFICE VISIT (OUTPATIENT)
Dept: FAMILY MEDICINE | Facility: CLINIC | Age: 78
End: 2023-06-01
Payer: COMMERCIAL

## 2023-06-01 VITALS
TEMPERATURE: 97.7 F | OXYGEN SATURATION: 93 % | SYSTOLIC BLOOD PRESSURE: 117 MMHG | DIASTOLIC BLOOD PRESSURE: 62 MMHG | HEART RATE: 92 BPM | RESPIRATION RATE: 13 BRPM

## 2023-06-01 DIAGNOSIS — F32.0 CURRENT MILD EPISODE OF MAJOR DEPRESSIVE DISORDER WITHOUT PRIOR EPISODE (H): ICD-10-CM

## 2023-06-01 DIAGNOSIS — Z91.89 POOR DENTAL HYGIENE: ICD-10-CM

## 2023-06-01 DIAGNOSIS — G89.4 CHRONIC PAIN SYNDROME: Primary | ICD-10-CM

## 2023-06-01 DIAGNOSIS — Z78.0 MENOPAUSE PRESENT: ICD-10-CM

## 2023-06-01 DIAGNOSIS — Z23 NEED FOR SHINGLES VACCINE: ICD-10-CM

## 2023-06-01 DIAGNOSIS — Z79.899 CONTROLLED SUBSTANCE AGREEMENT SIGNED: ICD-10-CM

## 2023-06-01 DIAGNOSIS — G50.0 TRIGEMINAL NEURALGIA: ICD-10-CM

## 2023-06-01 DIAGNOSIS — F11.20 CONTINUOUS OPIOID DEPENDENCE (H): ICD-10-CM

## 2023-06-01 DIAGNOSIS — E55.9 VITAMIN D DEFICIENCY: ICD-10-CM

## 2023-06-01 DIAGNOSIS — E46 MALNUTRITION, UNSPECIFIED TYPE (H): ICD-10-CM

## 2023-06-01 DIAGNOSIS — G43.009 MIGRAINE WITHOUT AURA AND WITHOUT STATUS MIGRAINOSUS, NOT INTRACTABLE: ICD-10-CM

## 2023-06-01 PROBLEM — U07.1 INFECTION DUE TO 2019 NOVEL CORONAVIRUS: Status: RESOLVED | Noted: 2022-07-19 | Resolved: 2023-06-01

## 2023-06-01 LAB
ALBUMIN SERPL BCG-MCNC: 4.1 G/DL (ref 3.5–5.2)
ALP SERPL-CCNC: 107 U/L (ref 35–104)
ALT SERPL W P-5'-P-CCNC: <5 U/L (ref 10–35)
ANION GAP SERPL CALCULATED.3IONS-SCNC: 12 MMOL/L (ref 7–15)
APAP SERPL-MCNC: <5 UG/ML (ref 10–30)
AST SERPL W P-5'-P-CCNC: 17 U/L (ref 10–35)
BILIRUB SERPL-MCNC: 0.2 MG/DL
BUN SERPL-MCNC: 18.4 MG/DL (ref 8–23)
CALCIUM SERPL-MCNC: 9 MG/DL (ref 8.8–10.2)
CHLORIDE SERPL-SCNC: 105 MMOL/L (ref 98–107)
CREAT SERPL-MCNC: 0.79 MG/DL (ref 0.51–0.95)
DEPRECATED HCO3 PLAS-SCNC: 25 MMOL/L (ref 22–29)
GFR SERPL CREATININE-BSD FRML MDRD: 76 ML/MIN/1.73M2
GLUCOSE SERPL-MCNC: 94 MG/DL (ref 70–99)
POTASSIUM SERPL-SCNC: 3.6 MMOL/L (ref 3.4–5.3)
PROT SERPL-MCNC: 7.2 G/DL (ref 6.4–8.3)
SODIUM SERPL-SCNC: 142 MMOL/L (ref 136–145)

## 2023-06-01 PROCEDURE — 80053 COMPREHEN METABOLIC PANEL: CPT | Performed by: FAMILY MEDICINE

## 2023-06-01 PROCEDURE — 36415 COLL VENOUS BLD VENIPUNCTURE: CPT | Performed by: FAMILY MEDICINE

## 2023-06-01 PROCEDURE — 80143 DRUG ASSAY ACETAMINOPHEN: CPT | Performed by: FAMILY MEDICINE

## 2023-06-01 PROCEDURE — 99214 OFFICE O/P EST MOD 30 MIN: CPT | Performed by: FAMILY MEDICINE

## 2023-06-01 RX ORDER — GABAPENTIN 300 MG/1
300 CAPSULE ORAL 3 TIMES DAILY
Qty: 270 CAPSULE | Refills: 0 | Status: ON HOLD | OUTPATIENT
Start: 2023-06-01 | End: 2023-07-06

## 2023-06-01 NOTE — PROGRESS NOTES
Assessment & Plan     Patient presents with:  Tongue pain        Julisa was seen today for tongue pain .    Diagnoses and all orders for this visit:    Chronic pain syndrome  Comments:  will increase her gabpentine to 300 mg TID hoping it will help with her pain.  Will not increase the total dose of her total number 3/day with high risk for Tylenol toxicity  Orders:  -     [id number] - Urine Drug Confirmation Panel (Comprehensive); Future  -     gabapentin (NEURONTIN) 300 MG capsule; Take 1 capsule (300 mg) by mouth 3 times daily for 90 days    F11.2 - Continuous opioid dependence (H)  -     [id number] - Urine Drug Confirmation Panel (Comprehensive); Future  -     Acetaminophen level; Future  -     gabapentin (NEURONTIN) 300 MG capsule; Take 1 capsule (300 mg) by mouth 3 times daily for 90 days  -     Acetaminophen level    Migraine without aura and without status migrainosus, not intractable  Continue Topamax  Malnutrition, unspecified type (H)  -     Comprehensive metabolic panel (BMP + Alb, Alk Phos, ALT, AST, Total. Bili, TP); Future  -     Comprehensive metabolic panel (BMP + Alb, Alk Phos, ALT, AST, Total. Bili, TP)    Current mild episode of major depressive disorder without prior episode (H)  Continue Seroquel and Remeron  Menopause present  -     DEXA HIP/PELVIS/SPINE - Future; Future    Vitamin D deficiency    Controlled substance agreement signed 6/1/23  -     gabapentin (NEURONTIN) 300 MG capsule; Take 1 capsule (300 mg) by mouth 3 times daily for 90 days    Need for shingles vaccine    Trigeminal neuralgia  Continue Trileptal, Keppra  -     gabapentin (NEURONTIN) 300 MG capsule; Take 1 capsule (300 mg) by mouth 3 times daily for 90 days    Poor dental hygiene  Comments:  doesn't want to go to dentist as they always flare up her face pain     Other orders  -     REVIEW OF HEALTH MAINTENANCE PROTOCOL ORDERS  -     PRIMARY CARE FOLLOW-UP SCHEDULING; Future       Advised patient that we will not increase  frequency her Tylenol 3 to more than 3 times a day. currently she taking 6 tablets every day.  History of very high Tylenol level in the past      No follow-ups on file.   Follow-up Visit   Expected date:  Dec 01, 2023 (Approximate)      Follow Up Appointment Details:     Follow-up with whom?: Me    Follow-Up for what?: Medicare Wellness    Welcome or Annual?: Annual Wellness    How?: In Person                      Subjective   Julisa Chaney 78 year old who presents for the following health issues           HPI   Answers for HPI/ROS submitted by the patient on 2023  What is the reason for your visit today? :face/ tongue pain        Anxiety Follow-Up    How are you doing with your anxiety since your last visit? No change    Are you having other symptoms that might be associated with anxiety? Yes:  poor sleep but much better with seroquel    Have you had a significant life event? Health Concerns     Are you feeling depressed? yes    Do you have any concerns with your use of alcohol or other drugs? No    Social History     Tobacco Use     Smoking status: Former     Packs/day: 1.00     Years: 50.00     Pack years: 50.00     Types: Cigarettes     Quit date: 2014     Years since quittin.5     Smokeless tobacco: Never   Substance Use Topics     Alcohol use: No     Drug use: No         2020     2:00 PM 6/15/2020    12:00 PM   YUE-7 SCORE   Total Score 7 7         2022     3:00 PM 2022     1:44 PM 2022     1:59 PM   PHQ   PHQ-9 Total Score 15 15 0   Q9: Thoughts of better off dead/self-harm past 2 weeks Not at all Not at all Not at all       Pain History:  When did you first notice your pain? - Chronic Pain   Have you seen this provider for your pain in the past?   Yes   Where in your body do you have pain? Left side of the face from trigeminal neuralgia   Are you seeing anyone else for your pain? Yes - physical therapy , home based       Patient Active Problem List   Diagnosis      Osteopenia     Hyperlipidemia     Migraine headache     Trigeminal neuralgia     Counseling regarding advanced directives and goals of care     Controlled substance agreement signed 6/1/23     Hypercalcemia     Chronic pain syndrome     Iron deficiency anemia due to chronic blood loss     Abnormal chest CT     Mild major depression (H)     Pyloric ulcer, chronic     Pyogenic brain abscess     F11.2 - Continuous opioid dependence (H)     Malnutrition, unspecified type (H)     Current mild episode of major depressive disorder without prior episode (H)        Current Outpatient Medications   Medication     acetaminophen-codeine (TYLENOL #3) 300-30 MG per tablet     desonide (DESOWEN) 0.05 % cream     ferrous sulfate (FEROSUL) 325 (65 Fe) MG tablet     gabapentin (NEURONTIN) 300 MG capsule     levETIRAcetam (KEPPRA) 750 MG tablet     Lidocaine (LIDOCARE) 4 % Patch     meclizine (ANTIVERT) 12.5 MG tablet     mirtazapine (REMERON) 15 MG tablet     Multiple Vitamin (MULTIVITAMIN) TABS     nortriptyline (PAMELOR) 50 MG capsule     omeprazole (PRILOSEC) 40 MG DR capsule     OXcarbazepine (TRILEPTAL) 150 MG tablet     polyvinyl alcohol (ARTIFICIAL TEARS) 1.4 % ophthalmic solution     QUEtiapine (SEROQUEL) 50 MG tablet     senna-docusate (SENOKOT-S/PERICOLACE) 8.6-50 MG tablet     topiramate (TOPAMAX) 50 MG tablet     Vitamin D3 50 mcg (2000 units) tablet     No current facility-administered medications for this visit.          Social Determinants of Health     Tobacco Use: Medium Risk (6/1/2023)    Patient History      Smoking Tobacco Use: Former      Smokeless Tobacco Use: Never      Passive Exposure: Not on file   Alcohol Use: Not on file   Financial Resource Strain: Not on file   Food Insecurity: Not on file   Transportation Needs: Not on file   Physical Activity: Not on file   Stress: Not on file   Social Connections: Not on file   Intimate Partner Violence: Not on file   Depression: Not at risk (12/1/2022)    PHQ-2       PHQ-2 Score: 0   Housing Stability: Not on file        Review of Systems   Constitutional, cardiovascular, pulmonary, GI, , musculoskeletal, neuro, skin, endocrine and psych systems are negative, except as otherwise noted.      Objective      /62   Pulse 92   Temp 97.7  F (36.5  C) (Temporal)   Resp 13   SpO2 93%      Physical Exam   GENERAL: healthy, alert and no distress, frail sitting on a wheelchair  NECK: no adenopathy, no asymmetry, masses, or scars and thyroid normal to palpation  RESP: lungs clear to auscultation - no rales, rhonchi or wheezes  CV: regular rate and rhythm, normal S1 S2, no S3 or S4, no murmur, click or rub, no peripheral edema and peripheral pulses strong  ABDOMEN: soft, nontender, no hepatosplenomegaly, no masses and bowel sounds normal      Mara Murillo MD   [facility].  [phone]

## 2023-06-01 NOTE — LETTER
Opioid / Opioid Plus Controlled Substance Agreement    This is an agreement between you and your provider about the safe and appropriate use of controlled substance/opioids prescribed by your care team. Controlled substances are medicines that can cause physical and mental dependence (abuse).    There are strict laws about having and using these medicines. We here at [facility] are committing to working with you in your efforts to get better. To support you in this work, we ll help you schedule regular office appointments for medicine refills. If we must cancel or change your appointment for any reason, we ll make sure you have enough medicine to last until your next appointment.     As a Provider, I will:    Listen carefully to your concerns and treat you with respect.     Recommend a treatment plan that I believe is in your best interest. This plan may involve therapies other than opioid pain medication.     Talk with you often about the possible benefits, and the risk of harm of any medicine that we prescribe for you.     Provide a plan on how to taper (discontinue or go off) using this medicine if the decision is made to stop its use.    As a Patient, I understand that opioid(s):     Are a controlled substance prescribed by my care team to help me function or work and manage my condition(s).     Are strong medicines and can cause serious side effects such as:    Drowsiness, which can seriously affect my driving ability    A lower breathing rate, enough to cause death    Harm to my thinking ability     Depression     Abuse of and addiction to this medicine    Need to be taken exactly as prescribed. Combining opioids with certain medicines or chemicals (such as illegal drugs, sedatives, sleeping pills, and benzodiazepines) can be dangerous or even fatal. If I stop opioids suddenly, I may have severe withdrawal symptoms.    Do not work for all types of pain nor for all patients. If they re not helpful, I may  be asked to stop them.    {Benzo / Stimulant (Optional):897033}    The risks, benefits and side effects of these medicine(s) were explained to me. I agree that:  1. I will take part in other treatments as advised by my care team. This may be psychiatry or counseling, physical therapy, behavioral therapy, group treatment or a referral to a specialist.     2. I will keep all my appointments. I understand that this is part of the monitoring of opioids. My care team may require an office visit for EVERY opioid/controlled substance refill. If I miss appointments or don t follow instructions, my care team may stop my medicine.    3. I will take my medicines as prescribed. I will not change the dose or schedule unless my care team tells me to. There will be no refills if I run out early.     4. I may be asked to come to the clinic and complete a urine drug test or complete a pill count at any time. If I don t give a urine sample or participate in a pill count, the care team may stop my medicine.    5. I will only receive prescriptions from this clinic for chronic pain. If I am treated by another provider for acute pain issues, I will tell them that I am taking opioid pain medication for chronic pain and that I have a treatment agreement with this provider. I will inform my [facility] care team within one business day if I am given a prescription for any pain medication by another healthcare provider. My [facility] care team can contact other providers and pharmacists about my use of any medicines.    6. It is up to me to make sure that I don t run out of my medicines on weekends or holidays. If my care team is willing to refill my opioid prescription without a visit, I must request refills only during office hours. Refills may take up to 3 business days to process. I will use one pharmacy to fill all my opioid and other controlled substance prescriptions. I will notify the clinic about any changes to my  insurance or medication availability.    7. I am responsible for my prescriptions. If the medicine/prescription is lost, stolen or destroyed, it will not be replaced. I also agree not to share controlled substance medicines with anyone.    8. I am aware I should not use any illegal or recreational drugs. I agree not to drink alcohol unless my care team says I can.       9. If I enroll in the [place] [facility] Cann[facility] program, I will tell my care team prior to my next refill.     10. I will tell my care team right away if I become pregnant, have a new medical problem treated outside of my regular clinic, or have a change in my medications.    11. I understand that this medicine can affect my thinking, judgment and reaction time. Alcohol and drugs affect the brain and body, which can affect the safety of my driving. Being under the influence of alcohol or drugs can affect my decision-making, behaviors, personal safety, and the safety of others. Driving while impaired (DWI) can occur if a person is driving, operating, or in physical control of a car, motorcycle, boat, snowmobile, ATV, motorbike, off-road vehicle, or any other motor vehicle ([place] Statute 169A.20). I understand the risk if I choose to drive or operate any vehicle or machinery.    I understand that if I do not follow any of the conditions above, my prescriptions or treatment may be stopped or changed.          Opioids  What You Need to Know    What are opioids?   Opioids are pain medicines that must be prescribed by a doctor. They are also known as narcotics.     Examples are:   1. morphine (MS Contin, Betina)  2. oxycodone (Oxycontin)  3. oxycodone and acetaminophen (Percocet)  4. hydrocodone and acetaminophen (Vicodin, Norco)   5. fentanyl patch (Duragesic)   6. hydromorphone (Dilaudid)   7. methadone  8. codeine (Tylenol #3)     What do opioids do well?   Opioids are best for severe short-term pain such as after a surgery or injury. They may work well  for cancer pain. They may help some people with long-lasting (chronic) pain.     What do opioids NOT do well?   Opioids never get rid of pain entirely, and they don t work well for most patients with chronic pain. Opioids don t reduce swelling, one of the causes of pain.                                    Other ways to manage chronic pain and improve function include:       Treat the health problem that may be causing pain    Anti-inflammation medicines, which reduce swelling and tenderness, such as ibuprofen (Advil, Motrin) or naproxen (Aleve)    Acetaminophen (Tylenol)    Antidepressants and anti-seizure medicines, especially for nerve pain    Topical treatments such as patches or creams    Injections or nerve blocks    Chiropractic or osteopathic treatment    Acupuncture, massage, deep breathing, meditation, visual imagery, aromatherapy    Use heat or ice at the pain site    Physical therapy     Exercise    Stop smoking    Take part in therapy       Risks and side effects     Talk to your doctor before you start or decide to keep taking opioids. Possible side effects include:      Lowering your breathing rate enough to cause death    Overdose, including death, especially if taking higher than prescribed doses    Worse depression symptoms; less pleasure in things you usually enjoy    Feeling tired or sluggish    Slower thoughts or cloudy thinking    Being more sensitive to pain over time; pain is harder to control    Trouble sleeping or restless sleep    Changes in hormone levels (for example, less testosterone)    Changes in sex drive or ability to have sex    Constipation    Unsafe driving    Itching and sweating    Dizziness    Nausea, throwing up and dry mouth    What else should I know about opioids?    Opioids may lead to dependence, tolerance, or addiction.      Dependence means that if you stop or reduce the medicine too quickly, you will have withdrawal symptoms. These include loose poop (diarrhea),  jitters, flu-like symptoms, nervousness and tremors. Dependence is not the same as addiction.                       Tolerance means needing higher doses over time to get the same effect. This may increase the chance of serious side effects.      Addiction is when people improperly use a substance that harms their body, their mind or their relations with others. Use of opiates can cause a relapse of addiction if you have a history of drug or alcohol abuse.      People who have used opioids for a long time may have a lower quality of life, worse depression, higher levels of pain and more visits to doctors.    You can overdose on opioids. Take these steps to lower your risk of overdose:    1. Recognize the signs:  Signs of overdose include decrease or loss of consciousness (blackout), slowed breathing, trouble waking up and blue lips. If someone is worried about overdose, they should call 911.    2. Talk to your doctor about Narcan (naloxone).   If you are at risk for overdose, you may be given a prescription for Narcan. This medicine very quickly reverses the effects of opioids.   If you overdose, a friend or family member can give you Narcan while waiting for the ambulance. They need to know the signs of overdose and how to give Narcan.     3. Don't use alcohol or street drugs.   Taking them with opioids can cause death.    4. Do not take any of these medicines unless your doctor says it s OK. Taking these with opioids can cause death:    Benzodiazepines, such as lorazepam (Ativan), alprazolam (Xanax) or diazepam (Valium)    Muscle relaxers, such as cyclobenzaprine (Flexeril)    Sleeping pills like zolpidem (Ambien)     Other opioids      How to keep you and other people safe while taking opioids:    1. Never share your opioids with others.  Opioid medicines are regulated by the Drug Enforcement Agency ([employer]). Selling or sharing medications is a criminal act.    2. Be sure to store opioids in a secure place, locked up  if possible. Young children can easily swallow them and overdose.    3. When you are traveling with your medicines, keep them in the original bottles. If you use a pill box, be sure you also carry a copy of your medicine list from your clinic or pharmacy.    4. Safe disposal of opioids    Most pharmacies have places to get rid of medicine, called disposal kiosks. Medicine disposal options are also available in every [facility]. Search your county and  medication disposal  to find more options. You can find more details at:  [email or web address]://[email or web address]-[email or web address]-medications     I agree that my provider, clinic care team, and pharmacy may work with any city, state or federal law enforcement agency that investigates the misuse, sale, or other diversion of my controlled medicine. I will allow my provider to discuss my care with, or share a copy of, this agreement with any other treating provider, pharmacy or emergency room where I receive care.    I have read this agreement and have asked questions about anything I did not understand.    _______________________________________________________  Patient Signature - Julisa Chaney _____________________                   Date     _______________________________________________________  Provider Signature - Mara Murillo MD   _____________________                   Date     _______________________________________________________  Witness Signature (required if provider not present while patient signing)   _____________________                   Date

## 2023-06-05 NOTE — TELEPHONE ENCOUNTER
"Sending to PCP for review.  Please review and advise on patient MyChart message.    Patient's daughter Alissa is requesting to have another prescription for gabapentin 100 mg for morning. (discontinue in med list)     Patient has prescription for gabapentin 300 mg - 1 capsule 3 times daily.    Per patient Mychart message - patient is taking, \" 100mg in the morning and 300 at night\".    Prescription pended for PCP approval.  Please change gabapentin 300 mg - 1 capsule 3 times daily. To gabapentin 300 mg - 1 capsule daily if appropriate.    Jadiel Negron RN  [facility]      "

## 2023-06-06 RX ORDER — GABAPENTIN 100 MG/1
100 CAPSULE ORAL
Qty: 90 CAPSULE | Refills: 0 | OUTPATIENT
Start: 2023-06-06

## 2023-06-08 NOTE — PROGRESS NOTES
CLINICAL NUTRITION SERVICES - REASSESSMENT NOTE      Recommendations Ordered by Registered Dietitian (RD):   Continue TF + banatrol for aid stool bulk as ordered - stooling does not appear to be improving with nutritional interventions at this time    Malnutrition: (8/25)  % Weight Loss: > 10% in 6 months (severe malnutrition) - per 8/4 RD note  % Intake:  No decreased intake noted - pt meeting needs from EN  Subcutaneous Fat Loss:  Orbital region moderate depletion - per 8/4 RD note  Muscle Loss:  Temporal region moderate depletion, Clavicle bone region moderate depletion, Acromion bone region moderate-severe depletion and Dorsal hand region severe depletion - per 8/4 RD note  Fluid Retention:  None noted     Malnutrition Diagnosis: Severe malnutrition  In Context of:  Acute illness or injury  Chronic illness or disease       EVALUATION OF PROGRESS TOWARD GOALS   Diet:  NPO    Nutrition Support:  TF continues at goal rate ~  Type of Feeding Tube: 18 Fr PEG (placed 8/16)  Enteral Frequency:  Continuous  Enteral Regimen: Vital 1.5 @ 50 mL/hr  Enteral Provisions: 1800 cals, 82 gm pro (1.7 gm/kg), 7 gm fiber, 917 mL free water, 224 gm CHO  1 packet Banatrol TID = 120 cals, 6 gm fiber  Free Water Flush: 200 mL every 4 hrs (8/20 - MD order)    Total provisions = 1920 kcal (40 kcal/kg), 13 g fiber, 2117 mL free water   Liquid MVI/M daily     Intake/Tolerance:    Labs reviewed: (9/5) Na 138, K 3.6, Mg 2.2, Phos 2.5 -- WNL  Recent Labs   Lab 09/06/22  0431 09/05/22  2021 09/05/22  1313 09/05/22  1240 09/05/22  0600 09/05/22  0415   * 101* 127* 122* 117* 116*     Medications reviewed: MSSI, lantus d/c'd 9/2  Stooling: CDiff positive on 8/1 - continuing on oral vanco through 9/22   - 9/2: BM x3  - 9/3: BM x7  - 9/4: BM x8  - 9/5: BM x7  Wt: 110 lbs 14.26 oz  Vitals:    08/24/22 0629 08/25/22 0610 08/31/22 0500 09/04/22 0618   Weight: 48.3 kg (106 lb 7.7 oz) 48.3 kg (106 lb 7.7 oz) 48 kg (105 lb 13.1 oz) 50.6 kg (111 lb  8.8 oz)    09/05/22 0552   Weight: 50.3 kg (110 lb 14.3 oz)         ASSESSED NUTRITION NEEDS:  Dosing Weight 48.3 kg (7/27 - admit wt)  Estimated Energy Needs: 4567-5098 kcals (35-40 Kcal/Kg)  Justification: repletion and underweight  Estimated Protein Needs: 70-95 grams protein (1.5-2 g pro/Kg)  Justification: repletion       NEW FINDINGS:   SLP re-evaluated 9/5 but only limited assessment able to be performed - continue NPO status     Previous Goals:   EN to meet % estimated needs  Evaluation: Met    Previous Nutrition Diagnosis:   No nutrition diagnosis identified at this time as EN meeting estimated needs  Evaluation: Completed      CURRENT NUTRITION DIAGNOSIS  Altered GI function related to ongoing diarrhea from prior CDiff infection and/or unknown etiology as evidenced by loose BMs x3-8 per day w/o improvement from nutritional interventions     INTERVENTIONS  Recommendations / Nutrition Prescription  Continue TF + banatrol as ordered     ?Consider imodium once off vanco for CDiff     Implementation  None new     Goals  EN to meet % estimated needs  BM <3x/day + bulking       MONITORING AND EVALUATION:  Progress towards goals will be monitored and evaluated per protocol and Practice Guidelines      Christiane Horner RD, LD  Clinical Dietitian   [facility], Neuroscience, 88, Postpartum, L&D  Pager: [phone]       Yes

## 2023-06-09 DIAGNOSIS — G47.00 PERSISTENT INSOMNIA: ICD-10-CM

## 2023-06-11 RX ORDER — LEVETIRACETAM 750 MG/1
TABLET ORAL
Qty: 180 TABLET | Refills: 1 | Status: SHIPPED | OUTPATIENT
Start: 2023-06-11 | End: 2023-07-21

## 2023-06-21 ENCOUNTER — MYC MEDICAL ADVICE (OUTPATIENT)
Dept: FAMILY MEDICINE | Facility: CLINIC | Age: 78
End: 2023-06-21
Payer: COMMERCIAL

## 2023-06-21 DIAGNOSIS — G89.4 CHRONIC PAIN SYNDROME: Primary | ICD-10-CM

## 2023-06-23 RX ORDER — GABAPENTIN 100 MG/1
CAPSULE ORAL
Qty: 120 CAPSULE | Refills: 1 | Status: ON HOLD | OUTPATIENT
Start: 2023-06-23 | End: 2023-07-06

## 2023-06-23 NOTE — TELEPHONE ENCOUNTER
Sending to PCP for review.  Please review and advise on patient MyChart message.    Last office visit: 6/1/2023 for Chronic pain syndrome.    Jadiel Negron RN  [facility]

## 2023-06-30 DIAGNOSIS — K59.00 CONSTIPATION, UNSPECIFIED CONSTIPATION TYPE: ICD-10-CM

## 2023-06-30 RX ORDER — DOCUSATE SODIUM 50 MG AND SENNOSIDES 8.6 MG 8.6; 5 MG/1; MG/1
TABLET, FILM COATED ORAL
Qty: 90 TABLET | Refills: 3 | Status: SHIPPED | OUTPATIENT
Start: 2023-06-30 | End: 2023-11-02

## 2023-06-30 NOTE — TELEPHONE ENCOUNTER
"Last Written Prescription Date:  12/1/22  Last Fill Quantity: 90,  # refills: 1   Last office visit provider:  6/1/23     Requested Prescriptions   Pending Prescriptions Disp Refills    STIMULANT LAXATIVE 8.6-50 MG tablet [Pharmacy Med Name: STIMULANT 8.6-50MG TABLETS] 90 tablet 1     Sig: TAKE 1 TABLET BY MOUTH AT BEDTIME       Laxatives Protocol Passed - 6/30/2023  9:38 AM        Passed - Patient is age 6 or older        Passed - Recent (12 mo) or future (30 days) visit within the authorizing provider's specialty     Patient has had an office visit with the authorizing provider or a provider within the authorizing providers department within the previous 12 mos or has a future within next 30 days. See \"Patient Info\" tab in inbasket, or \"Choose Columns\" in Meds & Orders section of the refill encounter.              Passed - Medication is active on med list             Snow Adair RN 06/30/23 2:26 PM  "

## 2023-07-01 ENCOUNTER — HOSPITAL ENCOUNTER (INPATIENT)
Facility: HOSPITAL | Age: 78
LOS: 4 days | Discharge: NURSING, REHAB OR LONG-TERM CARE | DRG: 871 | End: 2023-07-06
Attending: FAMILY MEDICINE | Admitting: HOSPITALIST
Payer: COMMERCIAL

## 2023-07-01 DIAGNOSIS — G43.009 MIGRAINE WITHOUT AURA AND WITHOUT STATUS MIGRAINOSUS, NOT INTRACTABLE: ICD-10-CM

## 2023-07-01 DIAGNOSIS — G89.4 CHRONIC PAIN SYNDROME: ICD-10-CM

## 2023-07-01 DIAGNOSIS — J18.9 PNEUMONIA OF RIGHT LOWER LOBE DUE TO INFECTIOUS ORGANISM: ICD-10-CM

## 2023-07-01 DIAGNOSIS — G50.0 TRIGEMINAL NEURALGIA: ICD-10-CM

## 2023-07-01 DIAGNOSIS — R33.9 URINARY RETENTION: Primary | ICD-10-CM

## 2023-07-01 DIAGNOSIS — E87.6 HYPOKALEMIA: ICD-10-CM

## 2023-07-01 LAB
BASOPHILS # BLD AUTO: 0.1 10E3/UL (ref 0–0.2)
BASOPHILS NFR BLD AUTO: 0 %
EOSINOPHIL # BLD AUTO: 0 10E3/UL (ref 0–0.7)
EOSINOPHIL NFR BLD AUTO: 0 %
ERYTHROCYTE [DISTWIDTH] IN BLOOD BY AUTOMATED COUNT: 14.2 % (ref 10–15)
HCT VFR BLD AUTO: 41.1 % (ref 35–47)
HGB BLD-MCNC: 12.6 G/DL (ref 11.7–15.7)
IMM GRANULOCYTES # BLD: 0.2 10E3/UL
IMM GRANULOCYTES NFR BLD: 1 %
LACTATE SERPL-SCNC: 1.2 MMOL/L (ref 0.7–2)
LYMPHOCYTES # BLD AUTO: 0.5 10E3/UL (ref 0.8–5.3)
LYMPHOCYTES NFR BLD AUTO: 2 %
MCH RBC QN AUTO: 27.2 PG (ref 26.5–33)
MCHC RBC AUTO-ENTMCNC: 30.7 G/DL (ref 31.5–36.5)
MCV RBC AUTO: 89 FL (ref 78–100)
MONOCYTES # BLD AUTO: 1.2 10E3/UL (ref 0–1.3)
MONOCYTES NFR BLD AUTO: 6 %
NEUTROPHILS # BLD AUTO: 20.2 10E3/UL (ref 1.6–8.3)
NEUTROPHILS NFR BLD AUTO: 91 %
NRBC # BLD AUTO: 0 10E3/UL
NRBC BLD AUTO-RTO: 0 /100
PLATELET # BLD AUTO: 291 10E3/UL (ref 150–450)
RBC # BLD AUTO: 4.63 10E6/UL (ref 3.8–5.2)
WBC # BLD AUTO: 22.1 10E3/UL (ref 4–11)

## 2023-07-01 PROCEDURE — 87040 BLOOD CULTURE FOR BACTERIA: CPT | Performed by: FAMILY MEDICINE

## 2023-07-01 PROCEDURE — 83735 ASSAY OF MAGNESIUM: CPT | Performed by: FAMILY MEDICINE

## 2023-07-01 PROCEDURE — 83605 ASSAY OF LACTIC ACID: CPT | Performed by: FAMILY MEDICINE

## 2023-07-01 PROCEDURE — 80048 BASIC METABOLIC PNL TOTAL CA: CPT | Performed by: FAMILY MEDICINE

## 2023-07-01 PROCEDURE — 84145 PROCALCITONIN (PCT): CPT | Performed by: FAMILY MEDICINE

## 2023-07-01 PROCEDURE — 85018 HEMOGLOBIN: CPT | Performed by: FAMILY MEDICINE

## 2023-07-01 PROCEDURE — 96366 THER/PROPH/DIAG IV INF ADDON: CPT

## 2023-07-01 PROCEDURE — 93005 ELECTROCARDIOGRAM TRACING: CPT | Performed by: FAMILY MEDICINE

## 2023-07-01 PROCEDURE — 99285 EMERGENCY DEPT VISIT HI MDM: CPT | Mod: 25

## 2023-07-01 PROCEDURE — 87637 SARSCOV2&INF A&B&RSV AMP PRB: CPT | Performed by: FAMILY MEDICINE

## 2023-07-01 PROCEDURE — 36415 COLL VENOUS BLD VENIPUNCTURE: CPT | Performed by: FAMILY MEDICINE

## 2023-07-01 RX ORDER — CEFTRIAXONE 2 G/1
2 INJECTION, POWDER, FOR SOLUTION INTRAMUSCULAR; INTRAVENOUS ONCE
Status: COMPLETED | OUTPATIENT
Start: 2023-07-02 | End: 2023-07-02

## 2023-07-01 ASSESSMENT — ENCOUNTER SYMPTOMS
SHORTNESS OF BREATH: 0
DIARRHEA: 0
APPETITE CHANGE: 1
VOMITING: 0
MYALGIAS: 1
WEAKNESS: 1
ABDOMINAL PAIN: 0

## 2023-07-01 ASSESSMENT — ACTIVITIES OF DAILY LIVING (ADL): ADLS_ACUITY_SCORE: 33

## 2023-07-02 ENCOUNTER — APPOINTMENT (OUTPATIENT)
Dept: SPEECH THERAPY | Facility: HOSPITAL | Age: 78
DRG: 871 | End: 2023-07-02
Attending: INTERNAL MEDICINE
Payer: COMMERCIAL

## 2023-07-02 ENCOUNTER — APPOINTMENT (OUTPATIENT)
Dept: PHYSICAL THERAPY | Facility: HOSPITAL | Age: 78
DRG: 871 | End: 2023-07-02
Attending: INTERNAL MEDICINE
Payer: COMMERCIAL

## 2023-07-02 ENCOUNTER — APPOINTMENT (OUTPATIENT)
Dept: RADIOLOGY | Facility: HOSPITAL | Age: 78
DRG: 871 | End: 2023-07-02
Attending: FAMILY MEDICINE
Payer: COMMERCIAL

## 2023-07-02 DIAGNOSIS — R42 DIZZINESS: ICD-10-CM

## 2023-07-02 PROBLEM — J18.9 PNEUMONIA OF RIGHT LOWER LOBE DUE TO INFECTIOUS ORGANISM: Status: ACTIVE | Noted: 2023-07-02

## 2023-07-02 LAB
ALBUMIN SERPL BCG-MCNC: 3 G/DL (ref 3.5–5.2)
ALBUMIN UR-MCNC: 10 MG/DL
ALP SERPL-CCNC: 114 U/L (ref 35–104)
ALT SERPL W P-5'-P-CCNC: 9 U/L (ref 0–50)
AMORPH CRY #/AREA URNS HPF: ABNORMAL /HPF
ANION GAP SERPL CALCULATED.3IONS-SCNC: 13 MMOL/L (ref 7–15)
ANION GAP SERPL CALCULATED.3IONS-SCNC: 8 MMOL/L (ref 7–15)
APPEARANCE UR: CLEAR
AST SERPL W P-5'-P-CCNC: 15 U/L (ref 0–45)
BILIRUB SERPL-MCNC: 0.2 MG/DL
BILIRUB UR QL STRIP: NEGATIVE
BUN SERPL-MCNC: 11.9 MG/DL (ref 8–23)
BUN SERPL-MCNC: 12.2 MG/DL (ref 8–23)
CALCIUM SERPL-MCNC: 8.4 MG/DL (ref 8.8–10.2)
CALCIUM SERPL-MCNC: 9.1 MG/DL (ref 8.8–10.2)
CHLORIDE SERPL-SCNC: 107 MMOL/L (ref 98–107)
CHLORIDE SERPL-SCNC: 108 MMOL/L (ref 98–107)
COLOR UR AUTO: YELLOW
CREAT SERPL-MCNC: 0.6 MG/DL (ref 0.51–0.95)
CREAT SERPL-MCNC: 0.63 MG/DL (ref 0.51–0.95)
CRP SERPL-MCNC: 128.7 MG/L
DEPRECATED HCO3 PLAS-SCNC: 25 MMOL/L (ref 22–29)
DEPRECATED HCO3 PLAS-SCNC: 29 MMOL/L (ref 22–29)
ERYTHROCYTE [DISTWIDTH] IN BLOOD BY AUTOMATED COUNT: 14.3 % (ref 10–15)
ERYTHROCYTE [SEDIMENTATION RATE] IN BLOOD BY WESTERGREN METHOD: 32 MM/HR (ref 0–30)
FLUAV RNA SPEC QL NAA+PROBE: NEGATIVE
FLUBV RNA RESP QL NAA+PROBE: NEGATIVE
GFR SERPL CREATININE-BSD FRML MDRD: 90 ML/MIN/1.73M2
GFR SERPL CREATININE-BSD FRML MDRD: >90 ML/MIN/1.73M2
GLUCOSE SERPL-MCNC: 133 MG/DL (ref 70–99)
GLUCOSE SERPL-MCNC: 197 MG/DL (ref 70–99)
GLUCOSE UR STRIP-MCNC: NEGATIVE MG/DL
HCT VFR BLD AUTO: 37 % (ref 35–47)
HGB BLD-MCNC: 11.2 G/DL (ref 11.7–15.7)
HGB UR QL STRIP: NEGATIVE
KETONES UR STRIP-MCNC: NEGATIVE MG/DL
LEUKOCYTE ESTERASE UR QL STRIP: NEGATIVE
MAGNESIUM SERPL-MCNC: 1.7 MG/DL (ref 1.7–2.3)
MCH RBC QN AUTO: 26.9 PG (ref 26.5–33)
MCHC RBC AUTO-ENTMCNC: 30.3 G/DL (ref 31.5–36.5)
MCV RBC AUTO: 89 FL (ref 78–100)
MRSA DNA SPEC QL NAA+PROBE: NEGATIVE
MUCOUS THREADS #/AREA URNS LPF: PRESENT /LPF
NITRATE UR QL: NEGATIVE
PH UR STRIP: 7.5 [PH] (ref 5–7)
PLATELET # BLD AUTO: 259 10E3/UL (ref 150–450)
POTASSIUM SERPL-SCNC: 3.1 MMOL/L (ref 3.4–5.3)
POTASSIUM SERPL-SCNC: 3.5 MMOL/L (ref 3.4–5.3)
PROCALCITONIN SERPL IA-MCNC: 1.19 NG/ML
PROT SERPL-MCNC: 5.7 G/DL (ref 6.4–8.3)
RBC # BLD AUTO: 4.16 10E6/UL (ref 3.8–5.2)
RBC URINE: 5 /HPF
RSV RNA SPEC NAA+PROBE: NEGATIVE
SA TARGET DNA: POSITIVE
SARS-COV-2 RNA RESP QL NAA+PROBE: NEGATIVE
SODIUM SERPL-SCNC: 145 MMOL/L (ref 136–145)
SODIUM SERPL-SCNC: 145 MMOL/L (ref 136–145)
SP GR UR STRIP: 1.02 (ref 1–1.03)
UROBILINOGEN UR STRIP-MCNC: <2 MG/DL
WBC # BLD AUTO: 18 10E3/UL (ref 4–11)
WBC URINE: 1 /HPF

## 2023-07-02 PROCEDURE — 97161 PT EVAL LOW COMPLEX 20 MIN: CPT | Mod: GP

## 2023-07-02 PROCEDURE — 92610 EVALUATE SWALLOWING FUNCTION: CPT | Mod: GN

## 2023-07-02 PROCEDURE — 250N000011 HC RX IP 250 OP 636: Mod: JZ | Performed by: INTERNAL MEDICINE

## 2023-07-02 PROCEDURE — 99207 PR NO BILLABLE SERVICE THIS VISIT: CPT | Performed by: INTERNAL MEDICINE

## 2023-07-02 PROCEDURE — 36415 COLL VENOUS BLD VENIPUNCTURE: CPT | Performed by: INTERNAL MEDICINE

## 2023-07-02 PROCEDURE — 250N000011 HC RX IP 250 OP 636: Mod: JZ | Performed by: HOSPITALIST

## 2023-07-02 PROCEDURE — 86140 C-REACTIVE PROTEIN: CPT | Performed by: INTERNAL MEDICINE

## 2023-07-02 PROCEDURE — 87641 MR-STAPH DNA AMP PROBE: CPT | Performed by: INTERNAL MEDICINE

## 2023-07-02 PROCEDURE — 85027 COMPLETE CBC AUTOMATED: CPT | Performed by: HOSPITALIST

## 2023-07-02 PROCEDURE — 258N000003 HC RX IP 258 OP 636: Performed by: HOSPITALIST

## 2023-07-02 PROCEDURE — 250N000013 HC RX MED GY IP 250 OP 250 PS 637: Performed by: HOSPITALIST

## 2023-07-02 PROCEDURE — 81001 URINALYSIS AUTO W/SCOPE: CPT | Performed by: FAMILY MEDICINE

## 2023-07-02 PROCEDURE — 83735 ASSAY OF MAGNESIUM: CPT | Performed by: HOSPITALIST

## 2023-07-02 PROCEDURE — 97110 THERAPEUTIC EXERCISES: CPT | Mod: GP

## 2023-07-02 PROCEDURE — 80053 COMPREHEN METABOLIC PANEL: CPT | Performed by: HOSPITALIST

## 2023-07-02 PROCEDURE — 258N000003 HC RX IP 258 OP 636: Performed by: FAMILY MEDICINE

## 2023-07-02 PROCEDURE — 92526 ORAL FUNCTION THERAPY: CPT | Mod: GN

## 2023-07-02 PROCEDURE — 250N000013 HC RX MED GY IP 250 OP 250 PS 637: Performed by: FAMILY MEDICINE

## 2023-07-02 PROCEDURE — 71045 X-RAY EXAM CHEST 1 VIEW: CPT

## 2023-07-02 PROCEDURE — 83735 ASSAY OF MAGNESIUM: CPT | Performed by: INTERNAL MEDICINE

## 2023-07-02 PROCEDURE — 250N000011 HC RX IP 250 OP 636: Mod: JZ | Performed by: FAMILY MEDICINE

## 2023-07-02 PROCEDURE — 85652 RBC SED RATE AUTOMATED: CPT | Performed by: INTERNAL MEDICINE

## 2023-07-02 PROCEDURE — 99223 1ST HOSP IP/OBS HIGH 75: CPT | Performed by: HOSPITALIST

## 2023-07-02 PROCEDURE — 250N000013 HC RX MED GY IP 250 OP 250 PS 637: Performed by: INTERNAL MEDICINE

## 2023-07-02 PROCEDURE — 120N000001 HC R&B MED SURG/OB

## 2023-07-02 PROCEDURE — 36415 COLL VENOUS BLD VENIPUNCTURE: CPT | Performed by: HOSPITALIST

## 2023-07-02 RX ORDER — LIDOCAINE 40 MG/G
CREAM TOPICAL
Status: DISCONTINUED | OUTPATIENT
Start: 2023-07-02 | End: 2023-07-06 | Stop reason: HOSPADM

## 2023-07-02 RX ORDER — MIRTAZAPINE 15 MG/1
15 TABLET, FILM COATED ORAL AT BEDTIME
Status: DISCONTINUED | OUTPATIENT
Start: 2023-07-02 | End: 2023-07-06 | Stop reason: HOSPADM

## 2023-07-02 RX ORDER — POLYVINYL ALCOHOL 14 MG/ML
1 SOLUTION/ DROPS OPHTHALMIC
Status: DISCONTINUED | OUTPATIENT
Start: 2023-07-02 | End: 2023-07-06 | Stop reason: HOSPADM

## 2023-07-02 RX ORDER — ENOXAPARIN SODIUM 100 MG/ML
40 INJECTION SUBCUTANEOUS EVERY 24 HOURS
Status: DISCONTINUED | OUTPATIENT
Start: 2023-07-02 | End: 2023-07-06 | Stop reason: HOSPADM

## 2023-07-02 RX ORDER — OXCARBAZEPINE 150 MG/1
450 TABLET, FILM COATED ORAL DAILY
Status: DISCONTINUED | OUTPATIENT
Start: 2023-07-02 | End: 2023-07-02

## 2023-07-02 RX ORDER — GUAIFENESIN 600 MG/1
600 TABLET, EXTENDED RELEASE ORAL 2 TIMES DAILY
Status: DISCONTINUED | OUTPATIENT
Start: 2023-07-02 | End: 2023-07-02

## 2023-07-02 RX ORDER — TAMSULOSIN HYDROCHLORIDE 0.4 MG/1
0.4 CAPSULE ORAL DAILY
Status: DISCONTINUED | OUTPATIENT
Start: 2023-07-02 | End: 2023-07-06 | Stop reason: HOSPADM

## 2023-07-02 RX ORDER — ACETAMINOPHEN 650 MG/1
650 SUPPOSITORY RECTAL EVERY 6 HOURS PRN
Status: DISCONTINUED | OUTPATIENT
Start: 2023-07-02 | End: 2023-07-06 | Stop reason: HOSPADM

## 2023-07-02 RX ORDER — ONDANSETRON 4 MG/1
4 TABLET, ORALLY DISINTEGRATING ORAL EVERY 6 HOURS PRN
Status: DISCONTINUED | OUTPATIENT
Start: 2023-07-02 | End: 2023-07-06 | Stop reason: HOSPADM

## 2023-07-02 RX ORDER — POTASSIUM CHLORIDE 7.45 MG/ML
10 INJECTION INTRAVENOUS ONCE
Status: COMPLETED | OUTPATIENT
Start: 2023-07-02 | End: 2023-07-02

## 2023-07-02 RX ORDER — AMOXICILLIN 250 MG
1 CAPSULE ORAL AT BEDTIME
Status: DISCONTINUED | OUTPATIENT
Start: 2023-07-02 | End: 2023-07-06 | Stop reason: HOSPADM

## 2023-07-02 RX ORDER — GABAPENTIN 300 MG/1
300 CAPSULE ORAL 2 TIMES DAILY
Status: DISCONTINUED | OUTPATIENT
Start: 2023-07-02 | End: 2023-07-06 | Stop reason: HOSPADM

## 2023-07-02 RX ORDER — ACETAMINOPHEN 325 MG/1
650 TABLET ORAL ONCE
Status: COMPLETED | OUTPATIENT
Start: 2023-07-02 | End: 2023-07-02

## 2023-07-02 RX ORDER — QUETIAPINE FUMARATE 25 MG/1
50 TABLET, FILM COATED ORAL AT BEDTIME
Status: DISCONTINUED | OUTPATIENT
Start: 2023-07-02 | End: 2023-07-02

## 2023-07-02 RX ORDER — OXCARBAZEPINE 150 MG/1
450 TABLET, FILM COATED ORAL AT BEDTIME
Status: DISCONTINUED | OUTPATIENT
Start: 2023-07-02 | End: 2023-07-06 | Stop reason: HOSPADM

## 2023-07-02 RX ORDER — ACETAMINOPHEN AND CODEINE PHOSPHATE 300; 30 MG/1; MG/1
1 TABLET ORAL EVERY 6 HOURS PRN
Status: DISCONTINUED | OUTPATIENT
Start: 2023-07-02 | End: 2023-07-02

## 2023-07-02 RX ORDER — TOPIRAMATE 25 MG/1
50 TABLET, FILM COATED ORAL AT BEDTIME
Status: DISCONTINUED | OUTPATIENT
Start: 2023-07-02 | End: 2023-07-06 | Stop reason: HOSPADM

## 2023-07-02 RX ORDER — TOPIRAMATE 25 MG/1
50 TABLET, FILM COATED ORAL AT BEDTIME
Status: DISCONTINUED | OUTPATIENT
Start: 2023-07-02 | End: 2023-07-02

## 2023-07-02 RX ORDER — CEFTRIAXONE 2 G/1
2 INJECTION, POWDER, FOR SOLUTION INTRAMUSCULAR; INTRAVENOUS EVERY 24 HOURS
Status: DISCONTINUED | OUTPATIENT
Start: 2023-07-02 | End: 2023-07-06

## 2023-07-02 RX ORDER — ACETAMINOPHEN AND CODEINE PHOSPHATE 300; 30 MG/1; MG/1
1 TABLET ORAL EVERY 6 HOURS PRN
Status: DISCONTINUED | OUTPATIENT
Start: 2023-07-02 | End: 2023-07-06 | Stop reason: HOSPADM

## 2023-07-02 RX ORDER — ACETAMINOPHEN 325 MG/1
650 TABLET ORAL EVERY 6 HOURS PRN
Status: DISCONTINUED | OUTPATIENT
Start: 2023-07-02 | End: 2023-07-06 | Stop reason: HOSPADM

## 2023-07-02 RX ORDER — AZITHROMYCIN 250 MG/1
250 TABLET, FILM COATED ORAL DAILY
Status: COMPLETED | OUTPATIENT
Start: 2023-07-03 | End: 2023-07-06

## 2023-07-02 RX ORDER — PANTOPRAZOLE SODIUM 40 MG/1
40 TABLET, DELAYED RELEASE ORAL DAILY
Status: DISCONTINUED | OUTPATIENT
Start: 2023-07-02 | End: 2023-07-06 | Stop reason: HOSPADM

## 2023-07-02 RX ORDER — IBUPROFEN 200 MG
400 TABLET ORAL EVERY 6 HOURS PRN
Status: DISCONTINUED | OUTPATIENT
Start: 2023-07-02 | End: 2023-07-06 | Stop reason: HOSPADM

## 2023-07-02 RX ORDER — QUETIAPINE FUMARATE 25 MG/1
50 TABLET, FILM COATED ORAL AT BEDTIME
Status: DISCONTINUED | OUTPATIENT
Start: 2023-07-02 | End: 2023-07-06 | Stop reason: HOSPADM

## 2023-07-02 RX ORDER — ONDANSETRON 2 MG/ML
4 INJECTION INTRAMUSCULAR; INTRAVENOUS EVERY 6 HOURS PRN
Status: DISCONTINUED | OUTPATIENT
Start: 2023-07-02 | End: 2023-07-06 | Stop reason: HOSPADM

## 2023-07-02 RX ADMIN — ACETAMINOPHEN 650 MG: 325 TABLET ORAL at 00:44

## 2023-07-02 RX ADMIN — Medication 1 MG: at 22:30

## 2023-07-02 RX ADMIN — ACETAMINOPHEN AND CODEINE PHOSPHATE 1 TABLET: 300; 30 TABLET ORAL at 11:51

## 2023-07-02 RX ADMIN — ACETAMINOPHEN AND CODEINE PHOSPHATE 1 TABLET: 300; 30 TABLET ORAL at 18:43

## 2023-07-02 RX ADMIN — LEVETIRACETAM 750 MG: 500 TABLET, FILM COATED ORAL at 11:44

## 2023-07-02 RX ADMIN — QUETIAPINE FUMARATE 50 MG: 25 TABLET ORAL at 22:30

## 2023-07-02 RX ADMIN — PANTOPRAZOLE SODIUM 40 MG: 40 TABLET, DELAYED RELEASE ORAL at 11:52

## 2023-07-02 RX ADMIN — CEFTRIAXONE SODIUM 2 G: 2 INJECTION, POWDER, FOR SOLUTION INTRAMUSCULAR; INTRAVENOUS at 00:14

## 2023-07-02 RX ADMIN — TAMSULOSIN HYDROCHLORIDE 0.4 MG: 0.4 CAPSULE ORAL at 09:05

## 2023-07-02 RX ADMIN — CEFTRIAXONE SODIUM 2 G: 2 INJECTION, POWDER, FOR SOLUTION INTRAMUSCULAR; INTRAVENOUS at 22:32

## 2023-07-02 RX ADMIN — ACETAMINOPHEN 650 MG: 325 TABLET ORAL at 22:30

## 2023-07-02 RX ADMIN — AZITHROMYCIN MONOHYDRATE 500 MG: 500 INJECTION, POWDER, LYOPHILIZED, FOR SOLUTION INTRAVENOUS at 00:46

## 2023-07-02 RX ADMIN — POTASSIUM CHLORIDE 10 MEQ: 7.46 INJECTION, SOLUTION INTRAVENOUS at 03:15

## 2023-07-02 RX ADMIN — MIRTAZAPINE 15 MG: 15 TABLET, FILM COATED ORAL at 22:40

## 2023-07-02 RX ADMIN — ACETAMINOPHEN AND CODEINE PHOSPHATE 1 TABLET: 300; 30 TABLET ORAL at 04:55

## 2023-07-02 RX ADMIN — SODIUM CHLORIDE, POTASSIUM CHLORIDE, SODIUM LACTATE AND CALCIUM CHLORIDE 1000 ML: 600; 310; 30; 20 INJECTION, SOLUTION INTRAVENOUS at 02:28

## 2023-07-02 RX ADMIN — ENOXAPARIN SODIUM 40 MG: 40 INJECTION SUBCUTANEOUS at 11:52

## 2023-07-02 RX ADMIN — LEVETIRACETAM 750 MG: 500 TABLET, FILM COATED ORAL at 20:17

## 2023-07-02 RX ADMIN — GABAPENTIN 300 MG: 300 CAPSULE ORAL at 20:17

## 2023-07-02 RX ADMIN — GUAIFENESIN 600 MG: 600 TABLET ORAL at 09:06

## 2023-07-02 RX ADMIN — SENNOSIDES AND DOCUSATE SODIUM 1 TABLET: 50; 8.6 TABLET ORAL at 22:30

## 2023-07-02 RX ADMIN — GABAPENTIN 300 MG: 300 CAPSULE ORAL at 12:26

## 2023-07-02 RX ADMIN — OXCARBAZEPINE 450 MG: 150 TABLET, FILM COATED ORAL at 22:39

## 2023-07-02 RX ADMIN — IBUPROFEN 400 MG: 200 TABLET, FILM COATED ORAL at 10:40

## 2023-07-02 RX ADMIN — TOPIRAMATE 50 MG: 25 TABLET, FILM COATED ORAL at 22:40

## 2023-07-02 RX ADMIN — Medication 1 MG: at 04:56

## 2023-07-02 ASSESSMENT — ACTIVITIES OF DAILY LIVING (ADL)
ADLS_ACUITY_SCORE: 37
ADLS_ACUITY_SCORE: 35
DEPENDENT_IADLS:: CLEANING;TRANSPORTATION;COOKING;LAUNDRY;SHOPPING;MEAL PREPARATION;MEDICATION MANAGEMENT
ADLS_ACUITY_SCORE: 35
ADLS_ACUITY_SCORE: 37
ADLS_ACUITY_SCORE: 35

## 2023-07-02 NOTE — PROGRESS NOTES
Chart/Notes/Labs/Imaging reviewed.  H&P by Dr. Garces from this morning reviewed.  Continue plan of care as currently ordered.

## 2023-07-02 NOTE — PROGRESS NOTES
"Speech-Language Pathology: Clinical Swallow Evaluation     07/02/23 1500   Appointment Info   Signing Clinician's Name / Credentials (SLP) Priscilla Lopez MS, CCC-SLP   General Information   Onset of Illness/Injury or Date of Surgery 07/01/23   Referring Physician Ebenezer Morales, DO   Pertinent History of Current Problem Per EMR, \"78 year old female with a pertinent history of osteopenia, hyperlipidemia, trigeminal neuralgia, hypercalcemia, chronic pain syndrome, iron deficiency anemia due to chronic blood loss, malnutrition, and pyogenic brain abscess who presents to this ED by wheelchair for evaluation of generalized weakness and a cough that began yesterday. Patient reports that she has been unable to get to the bathroom on her own recently.   Patient also mentions that she has had 3 falls recently and her last fall was yesterday (6/30).   Patient's granddaughters mention that the patient has not been eating well recently too.   Patient endorses mild leg swelling.   Patient denies shortness of breath, chest pain, abdominal pain, vomiting, diarrhea, urinary symptoms, or any other concerns at this time.\" Chest x-ray confirmed PNA of R LL.   General Observations Alert and cooperative.   Type of Evaluation   Type of Evaluation Swallow Evaluation   Oral Motor   Oral Musculature anomalies present   Structural Abnormalities none present   Mucosal Quality good   Dentition (Oral Motor)   Comment, Dentition (Oral Motor) Upper implants with some bottom R teeth, but significant number of teeth missing; Pt reports consuming softer foods   Dentition (Oral Motor) significant number of missing teeth;dental appliance/dentures   Dental Appliance/Denture (Oral Motor) other (see comments)  (upper implants)   Facial Symmetry (Oral Motor)   Facial Symmetry (Oral Motor) WNL   Lip Function (Oral Motor)   Lip Range of Motion (Oral Motor) WNL   Lip Coordination (Oral Motor) bilateral   Tongue Function (Oral Motor)   Tongue Strength " (Oral Motor) WFL   Tongue Coordination/Speed (Oral Motor) reduced rate   Tongue ROM (Oral Motor)   (WFL - reduced movement, pt endorses due to jaw pain)   Jaw Function (Oral Motor)   Jaw Function (Oral Motor) range of motion impairment   Comment, Jaw Function (Oral Motor) Pt with reduced opening of jaw; pt reports that when opening her mouth too far she gets radiating pain beginning on her L forhead down through her face.   Jaw Range of Motion Impairment bilateral   Cough/Swallow/Gag Reflex (Oral Motor)   Soft Palate/Velum (Oral Motor) WNL   Volitional Throat Clear/Cough (Oral Motor) reduced strength   Volitional Swallow (Oral Motor) WNL   Vocal Quality/Secretion Management (Oral Motor)   Vocal Quality (Oral Motor) WNL   Secretion Management (Oral Motor) WNL   General Swallowing Observations   Past History of Dysphagia Pt with extensive dysphagia history with multiple VFSS 2/7/22, 7/20/22, & 9/15/2022 with a G-tube placement in 8/16/22. Most recent video swallow study (9/15/2022) recommended soft and bite sized solids with thin liquids with strategies of hard swallow, small sips/bites, and slow rate. Pt with silent aspiration with thin liquids on previous videos.   Respiratory Support (General Swallowing Observations) none  (frequently between 88-92 % O2)   Current Diet/Method of Nutritional Intake (General Swallowing Observations, NIS) thin liquids (level 0);regular diet   Swallowing Evaluation Clinical swallow evaluation   Clinical Swallow Evaluation   Feeding Assistance minimal assistance required   Additional evaluation(s) completed today Recommended   Rationale for completing additional evaluation VFSS; hx of silent aspiration with thin liquids and PNA of R LL   Clinical Swallow Evaluation Textures Trialed thin liquids;mildly thick liquids;pureed;soft & bite-sized   Clinical Swallow Eval: Thin Liquid Texture Trial   Mode of Presentation, Thin Liquids cup;self-fed   Volume of Liquid or Food Presented 2 small  sips from cup   Oral Phase of Swallow WFL   Pharyngeal Phase of Swallow other (see comments)  (no overt s/s of aspiration)   Diagnostic Statement No overt s/s of aspiration with thin, but hx of silent aspiration with thin liquids.   Clinical Swallow Eval: Mildly Thick Liquids   Mode of Presentation cup;self-fed   Volume Presented 2 oz   Oral Phase WFL   Pharyngeal Phase intact;repeated swallows   Diagnostic Statement no overt s/s of aspiration. Hard swallow and small sips previously recommended and used with intermittant verbal cues   Clinical Swallow Evaluation: Puree Solid Texture Trial   Mode of Presentation, Puree spoon;self-fed   Volume of Puree Presented 2 oz   Oral Phase, Puree WFL   Pharyngeal Phase, Puree intact   Diagnostic Statement Very small bites, which pt endorses due to not liking flavor. Reduced trials.   Clinical Swallow Eval: Soft & Bite Sized   Mode of Presentation self-fed   Volume Presented 3 brea crackers dipped in applesauce   Oral Phase WFL  (slowed, but functional)   Pharyngeal Phase intact;repeated swallows   Diagnostic Statement Slowed, but functional with no overt s/s of aspiration   Esophageal Phase of Swallow   Patient reports or presents with symptoms of esophageal dysphagia No   Swallowing Recommendations   Diet Consistency Recommendations mildly thick liquids (level 2);soft & bite-sized (level 6)   Supervision Level for Intake distant supervision needed   Mode of Delivery Recommendations bolus size, small;no straws;slow rate of intake   Monitoring/Assistance Required (Eating/Swallowing) stop eating activities when fatigue is present   Recommended Feeding/Eating Techniques (Swallow Eval) maintain upright sitting position for eating;set-up and prepare tray   Medication Administration Recommendations, Swallowing (SLP) Crushed or whole in puree   Instrumental Assessment Recommendations VFSS (videofluoroscopic swallowing study)   Comment, Swallowing Recommendations Recommend soft and  bite sized solids and mildly thick liquids with no straws, small bites/sips, and slow rate. Patient will need distant supervision of strategies. VFSS to be scheduled for tomorrow with pt's hx of silent aspiration with thin liquids (most recent 9/15/22).   General Therapy Interventions   Planned Therapy Interventions Dysphagia Treatment   Dysphagia treatment Oropharyngeal exercise training;Modified diet education;Instruction of safe swallow strategies;Compensatory strategies for swallowing   Clinical Impression   Criteria for Skilled Therapeutic Interventions Met (SLP Eval) Yes, treatment indicated   SLP Diagnosis Dysphagia   Risks & Benefits of therapy have been explained evaluation/treatment results reviewed;care plan/treatment goals reviewed;risks/benefits reviewed;current/potential barriers reviewed;participants voiced agreement with care plan;participants included;patient   Clinical Impression Comments Clinical Swallow Evaluation completed. Patient with previous dysphagia and multiple VFSS with hx of silent aspiration of thin liquids. Patient previously recommended S&B with thin liquids with small bites/sips, hard swallow, and slowed rate, which patient was unable to recall and reported not using at home. Patient had no overt s/s aspiration with all textures presented. Oral motor function was impaired with reduced oral opening, due to pt reporting a radiating pain in her jaw, beginning in upper L forehead and moving throughout her face. Mastication was slowed, but functional, secondary to dentition of missing a significant amount of teeth, with no reported difficulty with pain of jaw while chewing. Hyolaryngeal elevation appears reduced upon visualization and palpation. Recommend diet of soft and bite sized solids and mildly thickened liquids with strategies of NO STRAWS, small sips/bites, and slow rate when sitting upright. Good oral cares should be completed if pt is agreeable. Pt reports not brushing her  "teeth for \"years\" due to the radiating pain. SLP educated on importance of oral cares and strategy carryover with risks and possible complications of aspiration and dysphagia. SLP to follow for completing VFSS and establishing LRD with swallowing strategies.   SLP Total Evaluation Time   Eval: oral/pharyngeal swallow function, clinical swallow Minutes (25535) 20                      Interventions   Interventions Quick Adds Swallowing Dysfunction   Swallowing Dysfunction &/or Oral Function for Feeding   Treatment of Swallowing Dysfunction &/or Oral Function for Feeding Minutes (99438) 8   Symptoms Noted During/After Treatment None   Treatment Detail/Skilled Intervention Patient educated on small sip strategy and hard swallow, which were previously recommended with thin liquids at previous VFSS. Pt observed using small sips with minimal verbal cues at onset, fading to independent with mildly thick liquids. Pt needed frequent verbal cues to use hard swallow strategy with mildly thickened liquids, but with training may be able to utilize consistently if determined successful with VFSS.   SLP Discharge Planning   SLP Plan VFSS   SLP Discharge Recommendation home with assist   SLP Rationale for DC Rec modified diet, below baseline diet   SLP Brief overview of current status  Recommend soft and bite sized solids and mildly thick liquids with no straws, small bites/sips, and slow rate. Patient will need distant supervision of strategies. VFSS to be scheduled for tomorrow with pt's hx of silent aspiration with thin liquids (most recent 9/15/22).   Total Session Time   Total Session Time (sum of timed and untimed services) 28     "

## 2023-07-02 NOTE — H&P
[facility]    History and Physical - Hospitalist Service       Date of Admission:  7/1/2023    Assessment & Plan      Julisa Chaney is a 78 year old female admitted on 7/1/2023.       Community acquired pneumonia  Acute respiratory failure with hypoxia  Sepsis  -LR 1 liter bolus x 1  -rocephin+zithromycin  -mucinex+duoneb  -blood cultures pending  -COVID negative    Hypokalemia  -replaced and monitor    Seizure  -home keppra    History of migraine  -home trileptal and topamax    depression  -await home med rec for remeron and seroquel      Diet:  regular  DVT Prophylaxis: Pneumatic Compression Devices  Abrams Catheter: Not present  Lines: None     Cardiac Monitoring: None  Code Status:   full    Clinically Significant Risk Factors Present on Admission        # Hypokalemia: Lowest K = 3.1 mmol/L in last 2 days, will replace as needed                         Disposition Plan      Expected Discharge Date: 07/04/2023                  Gisselle Garces MD  Hospitalist Service  [facility]  Securely message with [employer] (more info)  Text page via [employer] Paging/Directory     ______________________________________________________________________    Chief Complaint   Cough and weakness    History of Present Illness   Julias Chaney is a 78 year old female with a pertinent history of osteopenia, hyperlipidemia, trigeminal neuralgia, hypercalcemia, chronic pain syndrome, iron deficiency anemia due to chronic blood loss, malnutrition, and pyogenic brain abscess who presents to this ED by wheelchair for evaluation of generalized weakness and a cough that began yesterday. Patient reports that she has been unable to get to the bathroom on her own recently.   Patient also mentions that she has had 3 falls recently and her last fall was yesterday (6/30).   Patient's granddaughters mention that the patient has not been eating well recently too.   Patient endorses mild leg swelling.   Patient  denies shortness of breath, chest pain, abdominal pain, vomiting, diarrhea, urinary symptoms, or any other concerns at this time.    Past Medical History    Past Medical History:   Diagnosis Date     Aspiration pneumonia (H) 02/2022     Depression      High cholesterol      Migraines      Pyloric ulcer, chronic      Sepsis (H) 08/10/2020     Trigeminal neuralgia        Past Surgical History   Past Surgical History:   Procedure Laterality Date     HYSTERECTOMY       IR GASTROSTOMY TUBE PERCUTANEOUS PLCMNT  8/16/2022     PICC TRIPLE LUMEN PLACEMENT  7/22/2022          MI ESOPHAGOGASTRODUODENOSCOPY TRANSORAL DIAGNOSTIC N/A 8/13/2020    Procedure: ESOPHAGOGASTRODUODENOSCOPY (EGD);  Surgeon: Nathan Smalls MD;  Location: [facility] [place];  Service: Gastroenterology     MI ESOPHAGOGASTRODUODENOSCOPY TRANSORAL DIAGNOSTIC N/A 8/14/2020    Procedure: ESOPHAGOGASTRODUODENOSCOPY (EGD), PYLORIC DILATION;  Surgeon: Nathan Smalls MD;  Location: [facility] [place];  Service: Gastroenterology     VENTRICULOSTOMY Left 7/31/2022    Procedure: LEFT FRONTAL VENTRICULOSTOMY;  Surgeon: Brady Heredia MD;  Location: [facility] COLONOSCOPY W/WO BRUSH/WASH N/A 8/13/2020    Procedure: COLONOSCOPY WITH POLYPECTOMY;  Surgeon: Nathan Smalls MD;  Location: [facility] [place];  Service: Gastroenterology       Prior to Admission Medications   Prior to Admission Medications   Prescriptions Last Dose Informant Patient Reported? Taking?   Lidocaine (LIDOCARE) 4 % Patch   No No   Sig: Place 1 patch onto the skin every 24 hours To prevent lidocaine toxicity, patient should be patch free for 12 hrs daily.  Back pain   Multiple Vitamin (MULTIVITAMIN) TABS   No No   Sig: Take 1 tablet by mouth daily   OXcarbazepine (TRILEPTAL) 150 MG tablet   No No   Sig: TAKE 3 TABLETS(450 MG) BY MOUTH AT BEDTIME   QUEtiapine (SEROQUEL) 50 MG tablet   No No   Sig: Take 1 tablet (50 mg) by mouth At Bedtime   Vitamin D3 50 mcg (2000 units) tablet   No No    Sig: Take 1 tablet (50 mcg) by mouth daily   acetaminophen-codeine (TYLENOL #3) 300-30 MG per tablet   No No   Sig: Take 1 tablet by mouth every 6 hours as needed for breakthrough pain or severe pain   desonide (DESOWEN) 0.05 % cream   No No   Sig: [DESONIDE (DESOWEN) 0.05 % CREAM] Apply to affected area 2 times daily   ferrous sulfate (FEROSUL) 325 (65 Fe) MG tablet   No No   Sig: Take 1 tablet (325 mg) by mouth daily (with breakfast)   gabapentin (NEURONTIN) 100 MG capsule   No No   Sig: Take 1 capsule 100mg in the morning and 3 capsules 300mg in the evening   gabapentin (NEURONTIN) 300 MG capsule   No No   Sig: Take 1 capsule (300 mg) by mouth 3 times daily for 90 days   levETIRAcetam (KEPPRA) 750 MG tablet   No No   Sig: TAKE 1 TABLET(750 MG) BY MOUTH TWICE DAILY   meclizine (ANTIVERT) 12.5 MG tablet   No No   Sig: TAKE 1 TABLET(12.5 MG) BY MOUTH TWICE DAILY AS NEEDED FOR DIZZINESS   mirtazapine (REMERON) 15 MG tablet   No No   Sig: TAKE 1 TABLET(15 MG) BY MOUTH AT BEDTIME   nortriptyline (PAMELOR) 50 MG capsule   No No   Sig: Take 1 capsule (50 mg) by mouth 2 times daily   omeprazole (PRILOSEC) 40 MG DR capsule   No No   Sig: Take 1 capsule (40 mg) by mouth daily   polyvinyl alcohol (ARTIFICIAL TEARS) 1.4 % ophthalmic solution   No No   Sig: Place 1 drop into both eyes every hour as needed for dry eyes   senna-docusate (STIMULANT LAXATIVE) 8.6-50 MG tablet   No No   Sig: TAKE 1 TABLET BY MOUTH AT BEDTIME   topiramate (TOPAMAX) 50 MG tablet   No No   Sig: TAKE 1 TABLET(50 MG) BY MOUTH AT BEDTIME      Facility-Administered Medications: None        Review of Systems    The 10 point Review of Systems is negative other than noted in the HPI or here.      Physical Exam   Vital Signs: Temp: 99.8  F (37.7  C) Temp src: Oral BP: 127/57 Pulse: 92   Resp: 20 SpO2: 93 % O2 Device: Nasal cannula Oxygen Delivery: 4 LPM  Weight: 111 lbs 0 oz    Constitutional: Awake, alert, cooperative, no apparent distress.  Eyes:  Conjunctiva and pupils examined and normal.  HEENT: Moist mucous membranes, normal dentition.  Respiratory: no wheezing, right basilar crackles  Cardiovascular: Regular rate and rhythm, normal S1 and S2, and no murmur noted.  GI: Soft, non-distended, non-tender, normal bowel sounds.  Lymph/Hematologic: No anterior cervical or supraclavicular adenopathy.  Skin: No rashes, no cyanosis, no edema.  Musculoskeletal: No joint swelling, erythema or tenderness.  Neurologic: Cranial nerves 2-12 intact, normal strength and sensation.  Psychiatric: Alert, oriented to person, place and time, no obvious anxiety or depression.    Medical Decision Making       75 MINUTES SPENT BY ME on the date of service doing chart review, history, exam, documentation & further activities per the note.      Data     I have personally reviewed the following data over the past 24 hrs:    22.1 (H)  \   12.6   / 291     145 107 12.2 /  197 (H)   3.1 (L) 25 0.63 \       Procal: 1.19 (H) CRP: N/A Lactic Acid: 1.2          CXR  New airspace infiltrate present right lung base consistent with pneumonia. Left lung remains clear. Heart size normal.

## 2023-07-02 NOTE — PLAN OF CARE
Problem: Plan of Care - These are the overarching goals to be used throughout the patient stay.    Goal: Absence of Hospital-Acquired Illness or Injury  Intervention: Identify and Manage Fall Risk  Recent Flowsheet Documentation  Taken 7/2/2023 1400 by Dayna Bruner, RN  Safety Promotion/Fall Prevention: activity supervised   Goal Outcome Evaluation:       Patient arrived the unit about 1345, she alert and oriented. Patient complain on her face, rated pain @ 9/10. 1 person transfer assist. She is on room air, oxygen sats 93%. IV saline locked.

## 2023-07-02 NOTE — PROGRESS NOTES
07/02/23 1355   Appointment Info   Signing Clinician's Name / Credentials (PT) Clementine Carrasco PT   Living Environment   People in Home alone   Current Living Arrangements house   Home Accessibility no concerns   Transportation Anticipated family or friend will provide   Living Environment Comments ramped entrance   Self-Care   Equipment Currently Used at Home wheelchair, manual;walker, rolling   Activity/Exercise/Self-Care Comment pt reports dtr works 5-9am then comes over to assist part of day with bathing and IADL's; pt reports sleeping on couch and dtr/granddtr leave meals for pt; use of manual w/c primarily but practicing with walker with home PT   General Information   Onset of Illness/Injury or Date of Surgery 07/01/23   Referring Physician Dr. Morales   Patient/Family Therapy Goals Statement (PT) take care of myself, go to bathroom, cook and eat meals   Pertinent History of Current Problem (include personal factors and/or comorbidities that impact the POC) pneumonia; PMH of osteopenia, hyperlipidemia, trigeminal neuralgia, hypercalcemia, chronic pain syndrome, iron deficiency anemia due to chronic blood loss, malnutrition, and pyogenic brain abscess   Existing Precautions/Restrictions fall   Cognition   Affect/Mental Status (Cognition) other (see comments)  (pt reports memory deficits)   Orientation Status (Cognition) oriented to;person;place;time   Follows Commands (Cognition) WFL   Range of Motion (ROM)   Range of Motion ROM is WFL   Strength (Manual Muscle Testing)   Strength (Manual Muscle Testing) Deficits observed during functional mobility   Strength Comments generalized weakness BLE R>L   Bed Mobility   Bed Mobility supine-sit   Supine-Sit [place] (Bed Mobility) minimum assist (75% patient effort);verbal cues   Assistive Device (Bed Mobility) bed rails   Transfers   Transfers sit-stand transfer   Sit-Stand Transfer   Sit-Stand [place] (Transfers) minimum assist (75% patient effort);verbal cues    Assistive Device (Sit-Stand Transfers) walker, front-wheeled   Gait/Stairs (Locomotion)   [place] Level (Gait) minimum assist (75% patient effort);verbal cues   Assistive Device (Gait) walker, front-wheeled   Distance in Feet 20   Pattern (Gait) step-to   Deviations/Abnormal Patterns (Gait) jake decreased;gait speed decreased;stride length decreased;weight shifting decreased;other (see comments)  (O2 RA 89-90%)   Balance   Balance Comments sitting sba; standing min assist   Clinical Impression   Criteria for Skilled Therapeutic Intervention Yes, treatment indicated   PT Diagnosis (PT) impaired functional mobility   Influenced by the following impairments decreased strength, balance, cognition, activity tolerance   Functional limitations due to impairments bed mob., transfers, gait   Clinical Presentation (PT Evaluation Complexity) Stable/Uncomplicated   Clinical Presentation Rationale pt presents as medically diagnosed   Clinical Decision Making (Complexity) low complexity   Planned Therapy Interventions (PT) balance training;bed mobility training;gait training;home exercise program;patient/family education;neuromuscular re-education;strengthening;transfer training;wheelchair management/propulsion training   Anticipated Equipment Needs at Discharge (PT) wheelchair;walker, rolling   Risk & Benefits of therapy have been explained evaluation/treatment results reviewed;patient   PT Total Evaluation Time   PT Eval, Low Complexity Minutes (84457) 15   Physical Therapy Goals   PT Frequency Daily   PT Predicted Duration/Target Date for Goal Attainment 07/09/23   PT Goals Bed Mobility;Transfers;Gait   PT: Bed Mobility Independent;Supine to/from sit   PT: Transfers Supervision/stand-by assist;Sit to/from stand;Assistive device;Bed to/from chair   PT: Gait Supervision/stand-by assist;Rolling walker;25 feet   Interventions   Interventions Quick Adds Therapeutic Procedure   Therapeutic Procedure/Exercise   Ther.  Procedure: strength, endurance, ROM, flexibillity Minutes (89325) 10   Symptoms Noted During/After Treatment fatigue   Treatment Detail/Skilled Intervention seated BLE ex x10 reps with cuing and demonstration for technique; chair push-ups min assist x5 reps   PT Discharge Planning   PT Plan gait with rw, transfers, LE ex   PT Discharge Recommendation (DC Rec) Transitional Care Facility   PT Rationale for DC Rec pt needs assist of 1 for mobility so recommend continued PT for strengthening and mobility training to enable safe return home; if family able to provide 24 hour care, then pt may go home with assist and home PT   PT Brief overview of current status amb. 20 feet with walker min assist   Total Session Time   Timed Code Treatment Minutes 10   Total Session Time (sum of timed and untimed services) 25

## 2023-07-02 NOTE — CONSULTS
"Care Management Initial Consult    General Information  Assessment completed with: PatientJulisa  Type of CM/SW Visit: Initial Assessment    Primary Care Provider verified and updated as needed: Yes   Readmission within the last 30 days: no previous admission in last 30 days      Reason for Consult: discharge planning  Advance Care Planning: Advance Care Planning Reviewed: no concerns identified          Communication Assessment  Patient's communication style: spoken language (English or Bilingual)                           Living Environment:   People in home: child(geetha), adult  Julisa and daughter Alissa  Current living Arrangements: house (\"ramp on the house\")      Able to return to prior arrangements: other (see comments) (unknown at this time)       Family/Social Support:  Care provided by: self, child(geetha)  Provides care for: no one, unable/limited ability to care for self     Children          Description of Support System: Supportive, Involved    Support Assessment: Adequate family and caregiver support, Adequate social supports, Patient communicates needs well met    Current Resources:   Patient receiving home care services: No     Community Resources: None  Equipment currently used at home: wheelchair, manual, walker, rolling, shower chair, grab bar, tub/shower (\"I mostly use the wheelchair all the time. I do use the walker for short distances like into the bathroom at home\".)  Supplies currently used at home: Other (\"glasses\")    Employment/Financial:  Employment Status: retired     Employment/ Comments: \"my  had benefits, but nothing that I use\"  Financial Concerns:     Referral to Financial Worker: No       Does the patient's insurance plan have a 3 day qualifying hospital stay waiver?  No    Lifestyle & Psychosocial Needs:  Social Determinants of Health     Tobacco Use: Medium Risk (7/1/2023)    Patient History      Smoking Tobacco Use: Former      Smokeless Tobacco Use: Never      " "Passive Exposure: Not on file   Alcohol Use: Not on file   Financial Resource Strain: Not on file   Food Insecurity: Not on file   Transportation Needs: Not on file   Physical Activity: Not on file   Stress: Not on file   Social Connections: Not on file   Intimate Partner Violence: Not on file   Depression: Not at risk (12/1/2022)    PHQ-2      PHQ-2 Score: 0   Housing Stability: Not on file       Functional Status:  Prior to admission patient needed assistance:   Dependent ADLs:: Wheelchair-with assist, Ambulation-walker, Bathing  Dependent IADLs:: Cleaning, Transportation, Cooking, Laundry, Shopping, Meal Preparation, Medication Management (\"daughter helps me\")  Assesssment of Functional Status: Not at baseline with ADL Functioning, Not at baseline with mobility, Not at  functional baseline    Mental Health Status:  Mental Health Status: Past Concern  Mental Health Management: Medication    Chemical Dependency Status:                Values/Beliefs:  Spiritual, Cultural Beliefs, Jew Practices, Values that affect care:                 Additional Information:  Julisa lives in a house and daughter Alissa living with her to help with some ADLs and all IADLs. \"My daughter helps with stand by assist when I shower and then with anything else I need. I can usually dress myself\".    \"I mostly use the wheelchair all the time. I do use the walker for short distances like into the bathroom at home\".    Unknown discharge needs, may need Home Care or TCU depending on hospital course.     Family to transport at discharge.    Alissa daughter [phone].    CM to follow for medical progression of care, discharge recommendations, and final discharge plan.    Ning Dougherty RN      "

## 2023-07-02 NOTE — PROGRESS NOTES
Ongoing urinary retention. Bladder scanned for 652. Provider notified. Straight cath for 600. UA sent. Oxygen titrated to 1L, sats maintained above 90%.

## 2023-07-02 NOTE — TELEPHONE ENCOUNTER
"Routing refill request to provider for review/approval because:  Routing for provider review - patient reports taking this medication twice daily rather than twice daily as needed    Last Written Prescription Date:  12/21/2022  Last Fill Quantity: 180,  # refills: 1   Last office visit provider:  6/1/2023     Requested Prescriptions   Pending Prescriptions Disp Refills     meclizine (ANTIVERT) 12.5 MG tablet [Pharmacy Med Name: MECLIZINE 12.5MG (RX) TABLETS] 180 tablet 1     Sig: TAKE 1 TABLET(12.5 MG) BY MOUTH TWICE DAILY AS NEEDED FOR DIZZINESS        Antivertigo/Antiemetic Agents Passed - 7/2/2023 10:58 AM        Passed - Recent (12 mo) or future (30 days) visit within the authorizing provider's specialty     Patient has had an office visit with the authorizing provider or a provider within the authorizing providers department within the previous 12 mos or has a future within next 30 days. See \"Patient Info\" tab in inbasket, or \"Choose Columns\" in Meds & Orders section of the refill encounter.              Passed - Medication is active on med list        Passed - Patient is 18 years of age or older             Karis Gallego RN 07/02/23 10:58 AM  "

## 2023-07-02 NOTE — ED PROVIDER NOTES
EMERGENCY DEPARTMENT ENCOUNTER      NAME: Julisa Chaney  AGE: 78 year old female  YOB: 1945  MRN: [id number]  EVALUATION DATE & TIME: 7/1/2023 10:55 PM    PCP: Mara Murillo    ED PROVIDER: Joshua Rivero M.D.    Chief Complaint   Patient presents with     Generalized Weakness     Shortness of Breath       FINAL IMPRESSION:  1. Pneumonia of right lower lobe due to infectious organism    2. Hypokalemia        ED COURSE & MEDICAL DECISION MAKING:    Pertinent Labs & Imaging studies independently interpreted by me. (See chart for details)  11:17 PM Patient seen and examined, reviewed most recent medical communications with primary care provider for chronic pain medicine, also most recent office visit June 1 where patient was seen with chronic pain and continuous opioid dependence, gabapentin was increased.  Differential diagnosis includes but not limited to pneumonia, sepsis, urinary tract infection, intra-abdominal infection, medication reaction, electrolyte disturbance, anemia, dysrhythmia, myocardial infarction.  Patient presents with generalized weakness, also cough.  On exam, hypoxia with crackles in the right lung.  Symptoms are most consistent with pneumonia, labs ordered to evaluate for other source of weakness including electrolyte disturbance or other infection.  12:19 AM white blood cell count was found to be 22,000, IV Rocephin and azithromycin initiated.  Lactate is normal, remaining labs are pending.  Chest x-ray independently interpreted by me demonstrates a right lower lobe infiltrate, it is quite rotated.  Patient will need to be admitted due to hypoxia and pneumonia, patient does not meet sepsis criteria at this time.  Will put the patient in the transfer portal.  12:30 AM no beds available for transfer.  Patient will be admitted to [facility]  12:34 AM I spoke with Dr. Garces, Hospitalist.  Also note hypokalemia, this is placed intravenously.  Magnesium is normal.    At  the conclusion of the encounter I discussed the results of all of the tests and the disposition. The questions were answered. The patient or family acknowledged understanding and was agreeable with the care plan.     Medical Decision Making    History:    Supplemental history from: Documented in chart, if applicable    External Record(s) reviewed: Documented in chart, if applicable.    Work Up:    Chart documentation includes differential considered and any EKGs or imaging independently interpreted by provider, where specified.    In additional to work up documented, I considered the following work up: Documented in chart, if applicable.    External consultation:    Discussion of management with another provider: Documented in chart, if applicable    Complicating factors:    Care impacted by chronic illness: Hyperlipidemia    Care affected by social determinants of health: Access to Medical Care    Disposition considerations: Admit.        EKG:    Performed at: 12:20 AM  Impression: Prolonged QT, no acute ischemic changes  Rate: 85  Rhythm: Sinus  Axis: Normal  AL Interval: 164  QRS Interval: 80  QTc Interval: 473  ST Changes: No acute ischemic changes  Comparison: August 2022, no acute change    I have independently reviewed and interpreted the EKG(s) documented above.    PROCEDURES:       MEDICATIONS GIVEN IN THE EMERGENCY:  Medications   azithromycin (ZITHROMAX) 500 mg in sodium chloride 0.9 % 250 mL intermittent infusion (500 mg Intravenous $New Bag 7/2/23 0046)   cefTRIAXone (ROCEPHIN) 2 g vial to attach to  ml bag for ADULTS or NS 50 ml bag for PEDS (has no administration in time range)   guaiFENesin (MUCINEX) 12 hr tablet 600 mg (has no administration in time range)   lidocaine 1 % 0.1-1 mL (has no administration in time range)   lidocaine (LMX4) cream (has no administration in time range)   sodium chloride (PF) 0.9% PF flush 3 mL (3 mLs Intracatheter $Given 7/2/23 0048)   sodium chloride (PF) 0.9% PF  flush 3 mL (has no administration in time range)   melatonin tablet 1 mg (has no administration in time range)   acetaminophen (TYLENOL) tablet 650 mg (has no administration in time range)     Or   acetaminophen (TYLENOL) Suppository 650 mg (has no administration in time range)   ondansetron (ZOFRAN ODT) ODT tab 4 mg (has no administration in time range)     Or   ondansetron (ZOFRAN) injection 4 mg (has no administration in time range)   acetaminophen-codeine (TYLENOL #3) 300-30 MG per tablet 1 tablet (has no administration in time range)   levETIRAcetam (KEPPRA) tablet 750 mg (has no administration in time range)   OXcarbazepine (TRILEPTAL) tablet 450 mg (has no administration in time range)   QUEtiapine (SEROquel) half-tab 50 mg (has no administration in time range)   topiramate (TOPAMAX) tablet 50 mg (has no administration in time range)   azithromycin (ZITHROMAX) tablet 250 mg (has no administration in time range)   potassium chloride 10 mEq in 100 mL sterile water infusion (has no administration in time range)   lactated ringers BOLUS 1,000 mL (has no administration in time range)   cefTRIAXone (ROCEPHIN) 2 g vial to attach to  ml bag for ADULTS or NS 50 ml bag for PEDS (0 g Intravenous Stopped 7/2/23 0048)   acetaminophen (TYLENOL) tablet 650 mg (650 mg Oral $Given 7/2/23 0044)       NEW PRESCRIPTIONS STARTED AT TODAY'S ER VISIT  New Prescriptions    No medications on file       =================================================================    HPI    Patient information was obtained from: Patient and granddaughters.      Julisa Chaney is a 78 year old female with a pertinent history of osteopenia, hyperlipidemia, trigeminal neuralgia, hypercalcemia, chronic pain syndrome, iron deficiency anemia due to chronic blood loss, malnutrition, and pyogenic brain abscess who presents to this ED by wheelchair for evaluation of generalized weakness.    Patient reports that she has been feeling generally weak  recently and also complains of a cough that began yesterday and general myalgias. Patient reports that she has been unable to get to the bathroom on her own recently and says that is new for her. Patient also mentions that she has had 3 falls recently and her last fall was yesterday (6/30). Patient's granddaughters mention that the patient has not been eating well recently too. Patient endorses mild leg swelling. Patient denies shortness of breath, chest pain, abdominal pain, vomiting, diarrhea, urinary symptoms, or any other concerns at this time.    REVIEW OF SYSTEMS   Review of Systems   Constitutional: Positive for appetite change.   Respiratory: Negative for shortness of breath.    Cardiovascular: Positive for leg swelling. Negative for chest pain.   Gastrointestinal: Negative for abdominal pain, diarrhea and vomiting.   Genitourinary: Negative.    Musculoskeletal: Positive for myalgias.   Neurological: Positive for weakness (Generalized).   All other systems reviewed and are negative.     All other systems reviewed and negative    PAST MEDICAL HISTORY:  Past Medical History:   Diagnosis Date     Aspiration pneumonia (H) 02/2022     Depression      High cholesterol      Migraines      Pyloric ulcer, chronic      Sepsis (H) 08/10/2020     Trigeminal neuralgia        PAST SURGICAL HISTORY:  Past Surgical History:   Procedure Laterality Date     HYSTERECTOMY       IR GASTROSTOMY TUBE PERCUTANEOUS PLCMNT  8/16/2022     PICC TRIPLE LUMEN PLACEMENT  7/22/2022          AR ESOPHAGOGASTRODUODENOSCOPY TRANSORAL DIAGNOSTIC N/A 8/13/2020    Procedure: ESOPHAGOGASTRODUODENOSCOPY (EGD);  Surgeon: Nathan Smalls MD;  Location: [facility];  Service: Gastroenterology     AR ESOPHAGOGASTRODUODENOSCOPY TRANSORAL DIAGNOSTIC N/A 8/14/2020    Procedure: ESOPHAGOGASTRODUODENOSCOPY (EGD), PYLORIC DILATION;  Surgeon: Nathan Smalls MD;  Location: [facility];  Service: Gastroenterology     VENTRICULOSTOMY Left  7/31/2022    Procedure: LEFT FRONTAL VENTRICULOSTOMY;  Surgeon: Brady Heredia MD;  Location:  OR     [facility] COLONOSCOPY W/WO BRUSH/WASH N/A 8/13/2020    Procedure: COLONOSCOPY WITH POLYPECTOMY;  Surgeon: Nathan Smalls MD;  [facility];  Service: Gastroenterology       CURRENT MEDICATIONS:    Current Facility-Administered Medications   Medication     acetaminophen (TYLENOL) tablet 650 mg    Or     acetaminophen (TYLENOL) Suppository 650 mg     acetaminophen-codeine (TYLENOL #3) 300-30 MG per tablet 1 tablet     azithromycin (ZITHROMAX) 500 mg in sodium chloride 0.9 % 250 mL intermittent infusion     [START ON 7/3/2023] azithromycin (ZITHROMAX) tablet 250 mg     cefTRIAXone (ROCEPHIN) 2 g vial to attach to  ml bag for ADULTS or NS 50 ml bag for PEDS     guaiFENesin (MUCINEX) 12 hr tablet 600 mg     lactated ringers BOLUS 1,000 mL     levETIRAcetam (KEPPRA) tablet 750 mg     lidocaine (LMX4) cream     lidocaine 1 % 0.1-1 mL     melatonin tablet 1 mg     ondansetron (ZOFRAN ODT) ODT tab 4 mg    Or     ondansetron (ZOFRAN) injection 4 mg     OXcarbazepine (TRILEPTAL) tablet 450 mg     potassium chloride 10 mEq in 100 mL sterile water infusion     QUEtiapine (SEROquel) half-tab 50 mg     sodium chloride (PF) 0.9% PF flush 3 mL     sodium chloride (PF) 0.9% PF flush 3 mL     topiramate (TOPAMAX) tablet 50 mg     Current Outpatient Medications   Medication     acetaminophen-codeine (TYLENOL #3) 300-30 MG per tablet     desonide (DESOWEN) 0.05 % cream     ferrous sulfate (FEROSUL) 325 (65 Fe) MG tablet     gabapentin (NEURONTIN) 100 MG capsule     gabapentin (NEURONTIN) 300 MG capsule     levETIRAcetam (KEPPRA) 750 MG tablet     Lidocaine (LIDOCARE) 4 % Patch     meclizine (ANTIVERT) 12.5 MG tablet     mirtazapine (REMERON) 15 MG tablet     Multiple Vitamin (MULTIVITAMIN) TABS     nortriptyline (PAMELOR) 50 MG capsule     omeprazole (PRILOSEC) 40 MG DR capsule     OXcarbazepine (TRILEPTAL) 150  MG tablet     polyvinyl alcohol (ARTIFICIAL TEARS) 1.4 % ophthalmic solution     QUEtiapine (SEROQUEL) 50 MG tablet     senna-docusate (STIMULANT LAXATIVE) 8.6-50 MG tablet     topiramate (TOPAMAX) 50 MG tablet     Vitamin D3 50 mcg (2000 units) tablet       ALLERGIES:  Allergies   Allergen Reactions     Contrast [Iohexol] Rash     Noticed during 2020 admission. Received IV contrast on      Chocolate Headache     Contrast Dye      Penicillins Rash       FAMILY HISTORY:  Family History   Problem Relation Age of Onset     Cancer Father      Testicular cancer Grandchild 17.00     Breast Cancer Mother      Breast Cancer Sister      Breast Cancer Maternal Aunt      Breast Cancer Sister        SOCIAL HISTORY:   Social History     Socioeconomic History     Marital status:    Tobacco Use     Smoking status: Former     Packs/day: 1.00     Years: 50.00     Pack years: 50.00     Types: Cigarettes     Quit date: 2014     Years since quittin.6     Smokeless tobacco: Never   Substance and Sexual Activity     Alcohol use: No     Drug use: No   Social History Narrative    Lives alone in the house, 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  Mara Murillo MD 2018 1:49 PM         VITALS:  /57   Pulse 82   Temp 99.8  F (37.7  C) (Oral)   Resp 25   Wt 50.3 kg (111 lb)   SpO2 96%   BMI 18.47 kg/m      PHYSICAL EXAM:  Physical Exam  Vitals and nursing note reviewed.   Constitutional:       Appearance: Normal appearance.   HENT:      Head: Normocephalic and atraumatic.      Right Ear: External ear normal.      Left Ear: External ear normal.      Nose: Nose normal.      Mouth/Throat:      Mouth: Mucous membranes are moist.   Eyes:      Extraocular Movements: Extraocular movements intact.      Conjunctiva/sclera: Conjunctivae normal.      Pupils: Pupils are equal, round, and reactive to light.   Cardiovascular:      Rate and Rhythm: Normal rate and regular  rhythm.   Pulmonary:      Effort: Pulmonary effort is normal.      Breath sounds: No wheezing or rales.      Comments: Coarse crackles bilaterally, worse on the right.  Abdominal:      General: Abdomen is flat. There is no distension.      Palpations: Abdomen is soft.      Tenderness: There is no abdominal tenderness. There is no guarding.   Musculoskeletal:         General: Normal range of motion.      Cervical back: Normal range of motion and neck supple.      Right lower leg: Edema present.      Left lower leg: Edema present.      Comments: Trace bilateral lower extremity edema.   Lymphadenopathy:      Cervical: No cervical adenopathy.   Skin:     General: Skin is warm and dry.   Neurological:      General: No focal deficit present.      Mental Status: She is alert and oriented to person, place, and time. Mental status is at baseline.      Comments: No gross focal neurologic deficits   Psychiatric:         Mood and Affect: Mood normal.         Behavior: Behavior normal.         Thought Content: Thought content normal.          LAB:  All pertinent labs reviewed and interpreted.  Results for orders placed or performed during the hospital encounter of 07/01/23   XR Chest Port 1 View    Impression    IMPRESSION: New airspace infiltrate present right lung base consistent with pneumonia. Left lung remains clear. Heart size normal.   Basic metabolic panel   Result Value Ref Range    Sodium 145 136 - 145 mmol/L    Potassium 3.1 (L) 3.4 - 5.3 mmol/L    Chloride 107 98 - 107 mmol/L    Carbon Dioxide (CO2) 25 22 - 29 mmol/L    Anion Gap 13 7 - 15 mmol/L    Urea Nitrogen 12.2 8.0 - 23.0 mg/dL    Creatinine 0.63 0.51 - 0.95 mg/dL    Calcium 9.1 8.8 - 10.2 mg/dL    Glucose 197 (H) 70 - 99 mg/dL    GFR Estimate 90 >60 mL/min/1.73m2   Lactic acid whole blood   Result Value Ref Range    Lactic Acid 1.2 0.7 - 2.0 mmol/L   Result Value Ref Range    Procalcitonin 1.19 (H) <0.05 ng/mL   Result Value Ref Range    Magnesium 1.7 1.7 -  2.3 mg/dL   Symptomatic Influenza A/B, RSV, & SARS-CoV2 PCR (COVID-19) Nasopharyngeal    Specimen: Nasopharyngeal; Swab   Result Value Ref Range    Influenza A PCR Negative Negative    Influenza B PCR Negative Negative    RSV PCR Negative Negative    SARS CoV2 PCR Negative Negative   CBC with platelets and differential   Result Value Ref Range    WBC Count 22.1 (H) 4.0 - 11.0 10e3/uL    RBC Count 4.63 3.80 - 5.20 10e6/uL    Hemoglobin 12.6 11.7 - 15.7 g/dL    Hematocrit 41.1 35.0 - 47.0 %    MCV 89 78 - 100 fL    MCH 27.2 26.5 - 33.0 pg    MCHC 30.7 (L) 31.5 - 36.5 g/dL    RDW 14.2 10.0 - 15.0 %    Platelet Count 291 150 - 450 10e3/uL    % Neutrophils 91 %    % Lymphocytes 2 %    % Monocytes 6 %    % Eosinophils 0 %    % Basophils 0 %    % Immature Granulocytes 1 %    NRBCs per 100 WBC 0 <1 /100    Absolute Neutrophils 20.2 (H) 1.6 - 8.3 10e3/uL    Absolute Lymphocytes 0.5 (L) 0.8 - 5.3 10e3/uL    Absolute Monocytes 1.2 0.0 - 1.3 10e3/uL    Absolute Eosinophils 0.0 0.0 - 0.7 10e3/uL    Absolute Basophils 0.1 0.0 - 0.2 10e3/uL    Absolute Immature Granulocytes 0.2 <=0.4 10e3/uL    Absolute NRBCs 0.0 10e3/uL       RADIOLOGY:  Reviewed all pertinent imaging. Please see official radiology report.  XR Chest Port 1 View   Final Result   IMPRESSION: New airspace infiltrate present right lung base consistent with pneumonia. Left lung remains clear. Heart size normal.          I, Michael Melissa, am serving as a scribe to document services personally performed by Dr. Rivero based on my observation and the provider's statements to me. I, Joshua Rivero MD attest that Michael Melissa is acting in a scribe capacity, has observed my performance of the services and has documented them in accordance with my direction.    Joshua Rivero M.D.  Emergency Medicine  [facility] EMERGENCY DEPARTMENT  [facility] [facility] [address] [phone]  Dept:  [phone]       Joshua Rivero MD  07/02/23 0146

## 2023-07-02 NOTE — ED NOTES
[facility]   ED Nurse to Floor Handoff     Julisa Chaney is a 78 year old female who speaks English and lives with family members,  in a home  They arrived in the ED by car from home    ED Chief Complaint: Generalized Weakness and Shortness of Breath    ED Dx;   Final diagnoses:   Pneumonia of right lower lobe due to infectious organism   Hypokalemia         Needed?: No    Allergies:   Allergies   Allergen Reactions     Contrast [Iohexol] Rash     Noticed during 08/2020 admission. Received IV contrast on 8/5     Chocolate Headache     Contrast Dye      Penicillins Rash   .  Past Medical Hx:   Past Medical History:   Diagnosis Date     Aspiration pneumonia (H) 02/2022     Depression      High cholesterol      Migraines      Pyloric ulcer, chronic      Sepsis (H) 08/10/2020     Trigeminal neuralgia       Baseline Mental status: other intermittent confusion  Current Mental Status changes: at basesline    Infection present or suspected this encounter: no  Sepsis suspected: No  Isolation type: Contact  Patient tested for COVID 19 prior to admission: NO     Activity level - Baseline/Home:  Wheelchair  Activity Level - Current:   Wheelchair    Bariatric equipment needed?: No    In the ED these meds were given:   Medications   cefTRIAXone (ROCEPHIN) 2 g vial to attach to  ml bag for ADULTS or NS 50 ml bag for PEDS (has no administration in time range)   lidocaine 1 % 0.1-1 mL (has no administration in time range)   lidocaine (LMX4) cream (has no administration in time range)   sodium chloride (PF) 0.9% PF flush 3 mL (3 mLs Intracatheter $Given 7/2/23 1152)   sodium chloride (PF) 0.9% PF flush 3 mL (has no administration in time range)   melatonin tablet 1 mg (1 mg Oral $Given 7/2/23 1112)   acetaminophen (TYLENOL) tablet 650 mg (has no administration in time range)     Or   acetaminophen (TYLENOL) Suppository 650 mg (has no administration in time range)   ondansetron (ZOFRAN ODT)  ODT tab 4 mg (has no administration in time range)     Or   ondansetron (ZOFRAN) injection 4 mg (has no administration in time range)   levETIRAcetam (KEPPRA) tablet 750 mg (750 mg Oral $Given 7/2/23 1144)   OXcarbazepine (TRILEPTAL) tablet 450 mg (has no administration in time range)   topiramate (TOPAMAX) tablet 50 mg (has no administration in time range)   azithromycin (ZITHROMAX) tablet 250 mg (has no administration in time range)   tamsulosin (FLOMAX) capsule 0.4 mg (0.4 mg Oral $Given 7/2/23 0905)   ibuprofen (ADVIL/MOTRIN) tablet 400 mg (400 mg Oral $Given 7/2/23 1040)   acetaminophen-codeine (TYLENOL #3) 300-30 MG per tablet 1 tablet (1 tablet Oral $Given 7/2/23 1151)   gabapentin (NEURONTIN) capsule 300 mg (300 mg Oral $Given 7/2/23 1226)   mirtazapine (REMERON) tablet 15 mg (has no administration in time range)   pantoprazole (PROTONIX) EC tablet 40 mg (40 mg Oral $Given 7/2/23 1152)   polyvinyl alcohol (LIQUIFILM TEARS) 1.4 % ophthalmic solution 1 drop (has no administration in time range)   QUEtiapine (SEROquel) half-tab 50 mg (has no administration in time range)   senna-docusate (SENOKOT-S/PERICOLACE) 8.6-50 MG per tablet 1 tablet (has no administration in time range)   enoxaparin ANTICOAGULANT (LOVENOX) injection 40 mg (40 mg Subcutaneous $Given 7/2/23 1152)   cefTRIAXone (ROCEPHIN) 2 g vial to attach to  ml bag for ADULTS or NS 50 ml bag for PEDS (0 g Intravenous Stopped 7/2/23 0048)   azithromycin (ZITHROMAX) 500 mg in sodium chloride 0.9 % 250 mL intermittent infusion (0 mg Intravenous Stopped 7/2/23 0146)   acetaminophen (TYLENOL) tablet 650 mg (650 mg Oral $Given 7/2/23 0044)   potassium chloride 10 mEq in 100 mL sterile water infusion (0 mEq Intravenous Stopped 7/2/23 0415)   lactated ringers BOLUS 1,000 mL (0 mLs Intravenous Stopped 7/2/23 8213)       Drips running?  No    Home pump  No    Current LDAs  Peripheral IV 07/01/23 Anterior;Left Upper forearm (Active)   Site Assessment WDL  07/01/23 2357   Line Status Saline locked 07/01/23 2357   Dressing Transparent 07/01/23 2357   Dressing Status clean;dry;intact 07/01/23 2357   Dressing Intervention New dressing  07/01/23 2357   Line Intervention Lab drawn 07/01/23 2357   Phlebitis Scale 0-->no symptoms 07/01/23 2357   Infiltration? no 07/01/23 2357   Number of days: 1       Gastrostomy/Enterostomy Gastrostomy LUQ 1 18 fr 18Fr [employer] YOLI Gastrostomy Feeding Tube Lot: [id number] Exp: 2025-02-14 (Active)   Number of days: 320       Labs results:   Labs Ordered and Resulted from Time of ED Arrival to Time of ED Departure   BASIC METABOLIC PANEL - Abnormal       Result Value    Sodium 145      Potassium 3.1 (*)     Chloride 107      Carbon Dioxide (CO2) 25      Anion Gap 13      Urea Nitrogen 12.2      Creatinine 0.63      Calcium 9.1      Glucose 197 (*)     GFR Estimate 90     PROCALCITONIN - Abnormal    Procalcitonin 1.19 (*)    ROUTINE UA WITH MICROSCOPIC REFLEX TO CULTURE - Abnormal    Color Urine Yellow      Appearance Urine Clear      Glucose Urine Negative      Bilirubin Urine Negative      Ketones Urine Negative      Specific Gravity Urine 1.020      Blood Urine Negative      pH Urine 7.5 (*)     Protein Albumin Urine 10 (*)     Urobilinogen Urine <2.0      Nitrite Urine Negative      Leukocyte Esterase Urine Negative      Mucus Urine Present (*)     Amorphous Crystals Urine Few (*)     RBC Urine 5 (*)     WBC Urine 1     CBC WITH PLATELETS AND DIFFERENTIAL - Abnormal    WBC Count 22.1 (*)     RBC Count 4.63      Hemoglobin 12.6      Hematocrit 41.1      MCV 89      MCH 27.2      MCHC 30.7 (*)     RDW 14.2      Platelet Count 291      % Neutrophils 91      % Lymphocytes 2      % Monocytes 6      % Eosinophils 0      % Basophils 0      % Immature Granulocytes 1      NRBCs per 100 WBC 0      Absolute Neutrophils 20.2 (*)     Absolute Lymphocytes 0.5 (*)     Absolute Monocytes 1.2      Absolute Eosinophils 0.0      Absolute Basophils 0.1       Absolute Immature Granulocytes 0.2      Absolute NRBCs 0.0     COMPREHENSIVE METABOLIC PANEL - Abnormal    Sodium 145      Potassium 3.5      Chloride 108 (*)     Carbon Dioxide (CO2) 29      Anion Gap 8      Urea Nitrogen 11.9      Creatinine 0.60      Calcium 8.4 (*)     Glucose 133 (*)     Alkaline Phosphatase 114 (*)     AST 15      ALT 9      Protein Total 5.7 (*)     Albumin 3.0 (*)     Bilirubin Total 0.2      GFR Estimate >90     CBC WITH PLATELETS - Abnormal    WBC Count 18.0 (*)     RBC Count 4.16      Hemoglobin 11.2 (*)     Hematocrit 37.0      MCV 89      MCH 26.9      MCHC 30.3 (*)     RDW 14.3      Platelet Count 259     ERYTHROCYTE SEDIMENTATION RATE AUTO - Abnormal    Erythrocyte Sedimentation Rate 32 (*)    CRP INFLAMMATION - Abnormal    CRP Inflammation 128.70 (*)    LACTIC ACID WHOLE BLOOD - Normal    Lactic Acid 1.2     MAGNESIUM - Normal    Magnesium 1.7     INFLUENZA A/B, RSV, & SARS-COV2 PCR - Normal    Influenza A PCR Negative      Influenza B PCR Negative      RSV PCR Negative      SARS CoV2 PCR Negative     MAGNESIUM - Normal    Magnesium 1.7     MAGNESIUM - Normal    Magnesium 1.7     BLOOD CULTURE   BLOOD CULTURE   RESPIRATORY AEROBIC BACTERIAL CULTURE   MRSA MSSA PCR, NASAL SWAB       Imaging Studies:   Recent Results (from the past 24 hour(s))   XR Chest Port 1 View    Narrative    EXAM: XR CHEST PORT 1 VIEW  LOCATION: [facility]  DATE: 7/2/2023    INDICATION: cough, weakness  COMPARISON: 07/25/2022      Impression    IMPRESSION: New airspace infiltrate present right lung base consistent with pneumonia. Left lung remains clear. Heart size normal.       Recent vital signs:   /60   Pulse 78   Temp 97.9  F (36.6  C) (Axillary)   Resp 20   Wt 50.3 kg (111 lb)   SpO2 92%   BMI 18.47 kg/m      [place] Coma Scale Score: 15 (07/02/23 6695)       Cardiac Rhythm: Normal Sinus  Pt needs tele? No  Skin/wound Issues: None    Code Status: Full Code    Pain  control: poor    Nausea control: pt had none    Abnormal labs/tests/findings requiring intervention: WBC 18.0    Family present during ED course? No   Family Comments/Social Situation comments: patient lives w/ daughter who provides her care    Tasks needing completion: resperitory culture    Kortney Reyes RN  [facility] --   [phone] [facility]  [phone] [facility]

## 2023-07-02 NOTE — PHARMACY-ADMISSION MEDICATION HISTORY
Pharmacist Admission Medication History    Admission medication history is complete. The information provided in this note is only as accurate as the sources available at the time of the update.    Medication reconciliation/reorder completed by provider prior to medication history? Yes    Information Source(s): Family member and CareEverywhere/SureScripts via phone    Pertinent Information:     Changes made to PTA medication list:    Added: None    Deleted: lidocaine patches    Changed: desonide cream, gabapentin, meclizine    Medication Affordability:  Not including over the counter (OTC) medications, was there a time in the past 3 months when you did not take your medications as prescribed because of cost?: Unable to Assess    Allergies reviewed with patient and updates made in EHR: yes    Medication History Completed By: Yoly Barlow [facility] 7/2/2023 10:30 AM  PTA Med List   Medication Sig Last Dose     acetaminophen-codeine (TYLENOL #3) 300-30 MG per tablet Take 1 tablet by mouth every 6 hours as needed for breakthrough pain or severe pain 7/1/2023 at pm     desonide (DESOWEN) 0.05 % cream [DESONIDE (DESOWEN) 0.05 % CREAM] Apply to affected area 2 times daily (Patient taking differently: Apply topically 2 times daily as needed (to sores as needed)) prn     ferrous sulfate (FEROSUL) 325 (65 Fe) MG tablet Take 1 tablet (325 mg) by mouth daily (with breakfast) 7/1/2023 at am     gabapentin (NEURONTIN) 100 MG capsule Take 1 capsule 100mg in the morning and 3 capsules 300mg in the evening (Patient taking differently: Take 100 mg by mouth every morning) 7/1/2023 at am     gabapentin (NEURONTIN) 300 MG capsule Take 1 capsule (300 mg) by mouth 3 times daily for 90 days (Patient taking differently: Take 300 mg by mouth every evening) Unknown     levETIRAcetam (KEPPRA) 750 MG tablet TAKE 1 TABLET(750 MG) BY MOUTH TWICE DAILY 7/1/2023 at unk     meclizine (ANTIVERT) 12.5 MG tablet TAKE 1 TABLET(12.5 MG) BY MOUTH TWICE DAILY  AS NEEDED FOR DIZZINESS (Patient taking differently: Take 12.5 mg by mouth 2 times daily) 7/1/2023 at unk     mirtazapine (REMERON) 15 MG tablet TAKE 1 TABLET(15 MG) BY MOUTH AT BEDTIME 7/1/2023 at pm     Multiple Vitamin (MULTIVITAMIN) TABS Take 1 tablet by mouth daily 7/1/2023 at am     nortriptyline (PAMELOR) 50 MG capsule Take 1 capsule (50 mg) by mouth 2 times daily Unknown     omeprazole (PRILOSEC) 40 MG DR capsule Take 1 capsule (40 mg) by mouth daily 7/1/2023 at am     OXcarbazepine (TRILEPTAL) 150 MG tablet TAKE 3 TABLETS(450 MG) BY MOUTH AT BEDTIME 7/1/2023 at pm     polyvinyl alcohol (ARTIFICIAL TEARS) 1.4 % ophthalmic solution Place 1 drop into both eyes every hour as needed for dry eyes prn     QUEtiapine (SEROQUEL) 50 MG tablet Take 1 tablet (50 mg) by mouth At Bedtime 7/1/2023 at pm     senna-docusate (STIMULANT LAXATIVE) 8.6-50 MG tablet TAKE 1 TABLET BY MOUTH AT BEDTIME 7/1/2023 at pm     topiramate (TOPAMAX) 50 MG tablet TAKE 1 TABLET(50 MG) BY MOUTH AT BEDTIME 7/1/2023 at pm     Vitamin D3 50 mcg (2000 units) tablet Take 1 tablet (50 mcg) by mouth daily 7/1/2023 at am

## 2023-07-02 NOTE — ED TRIAGE NOTES
Pt was brought in by family for generalized weakness. Pt c/o pain all over. Denies CP and SOB. While in triage noted pt's sats at RA is 88%. Applied 2L/NC and sats was 90's. Titrate O2 to 4L/NC and sats is 94%. Family also reported that pt is not eating much. Noticed when pt was transferred from chair to bed, pt needs assist of two and pt was so slow. Also noted that pt starts coughing. Per pt it started last night.     Triage Assessment       Row Name 07/01/23 0053       Triage Assessment (Adult)    Airway WDL WDL       Respiratory WDL    Respiratory WDL WDL       Skin Circulation/Temperature WDL    Skin Circulation/Temperature WDL WDL       Cardiac WDL    Cardiac WDL WDL       Peripheral/Neurovascular WDL    Peripheral Neurovascular WDL WDL       Cognitive/Neuro/Behavioral WDL    Cognitive/Neuro/Behavioral WDL WDL

## 2023-07-03 ENCOUNTER — APPOINTMENT (OUTPATIENT)
Dept: OCCUPATIONAL THERAPY | Facility: HOSPITAL | Age: 78
DRG: 871 | End: 2023-07-03
Attending: INTERNAL MEDICINE
Payer: COMMERCIAL

## 2023-07-03 ENCOUNTER — APPOINTMENT (OUTPATIENT)
Dept: SPEECH THERAPY | Facility: HOSPITAL | Age: 78
DRG: 871 | End: 2023-07-03
Payer: COMMERCIAL

## 2023-07-03 ENCOUNTER — APPOINTMENT (OUTPATIENT)
Dept: PHYSICAL THERAPY | Facility: HOSPITAL | Age: 78
DRG: 871 | End: 2023-07-03
Payer: COMMERCIAL

## 2023-07-03 ENCOUNTER — APPOINTMENT (OUTPATIENT)
Dept: RADIOLOGY | Facility: HOSPITAL | Age: 78
DRG: 871 | End: 2023-07-03
Attending: INTERNAL MEDICINE
Payer: COMMERCIAL

## 2023-07-03 LAB
ALBUMIN SERPL BCG-MCNC: 2.7 G/DL (ref 3.5–5.2)
ALP SERPL-CCNC: 124 U/L (ref 35–104)
ALT SERPL W P-5'-P-CCNC: 8 U/L (ref 0–50)
ANION GAP SERPL CALCULATED.3IONS-SCNC: 10 MMOL/L (ref 7–15)
AST SERPL W P-5'-P-CCNC: 20 U/L (ref 0–45)
BASOPHILS # BLD AUTO: 0.1 10E3/UL (ref 0–0.2)
BASOPHILS NFR BLD AUTO: 0 %
BILIRUB SERPL-MCNC: 0.2 MG/DL
BUN SERPL-MCNC: 12.3 MG/DL (ref 8–23)
CALCIUM SERPL-MCNC: 9.4 MG/DL (ref 8.8–10.2)
CHLORIDE SERPL-SCNC: 107 MMOL/L (ref 98–107)
CREAT SERPL-MCNC: 0.59 MG/DL (ref 0.51–0.95)
DEPRECATED HCO3 PLAS-SCNC: 27 MMOL/L (ref 22–29)
EOSINOPHIL # BLD AUTO: 0.2 10E3/UL (ref 0–0.7)
EOSINOPHIL NFR BLD AUTO: 2 %
ERYTHROCYTE [DISTWIDTH] IN BLOOD BY AUTOMATED COUNT: 14.3 % (ref 10–15)
GFR SERPL CREATININE-BSD FRML MDRD: >90 ML/MIN/1.73M2
GLUCOSE SERPL-MCNC: 92 MG/DL (ref 70–99)
HCT VFR BLD AUTO: 37.3 % (ref 35–47)
HGB BLD-MCNC: 11.1 G/DL (ref 11.7–15.7)
IMM GRANULOCYTES # BLD: 0.1 10E3/UL
IMM GRANULOCYTES NFR BLD: 1 %
LYMPHOCYTES # BLD AUTO: 1.6 10E3/UL (ref 0.8–5.3)
LYMPHOCYTES NFR BLD AUTO: 11 %
MCH RBC QN AUTO: 26.7 PG (ref 26.5–33)
MCHC RBC AUTO-ENTMCNC: 29.8 G/DL (ref 31.5–36.5)
MCV RBC AUTO: 90 FL (ref 78–100)
MONOCYTES # BLD AUTO: 1 10E3/UL (ref 0–1.3)
MONOCYTES NFR BLD AUTO: 7 %
NEUTROPHILS # BLD AUTO: 11.2 10E3/UL (ref 1.6–8.3)
NEUTROPHILS NFR BLD AUTO: 79 %
NRBC # BLD AUTO: 0 10E3/UL
NRBC BLD AUTO-RTO: 0 /100
PLATELET # BLD AUTO: 216 10E3/UL (ref 150–450)
POTASSIUM SERPL-SCNC: 3.5 MMOL/L (ref 3.4–5.3)
PROT SERPL-MCNC: 6.1 G/DL (ref 6.4–8.3)
RBC # BLD AUTO: 4.15 10E6/UL (ref 3.8–5.2)
SODIUM SERPL-SCNC: 144 MMOL/L (ref 136–145)
WBC # BLD AUTO: 14.1 10E3/UL (ref 4–11)

## 2023-07-03 PROCEDURE — 97535 SELF CARE MNGMENT TRAINING: CPT | Mod: GO

## 2023-07-03 PROCEDURE — 250N000013 HC RX MED GY IP 250 OP 250 PS 637: Performed by: INTERNAL MEDICINE

## 2023-07-03 PROCEDURE — 92526 ORAL FUNCTION THERAPY: CPT | Mod: GN

## 2023-07-03 PROCEDURE — 999N000157 HC STATISTIC RCP TIME EA 10 MIN

## 2023-07-03 PROCEDURE — 99232 SBSQ HOSP IP/OBS MODERATE 35: CPT | Performed by: INTERNAL MEDICINE

## 2023-07-03 PROCEDURE — 97110 THERAPEUTIC EXERCISES: CPT | Mod: GP

## 2023-07-03 PROCEDURE — 85025 COMPLETE CBC W/AUTO DIFF WBC: CPT | Performed by: INTERNAL MEDICINE

## 2023-07-03 PROCEDURE — 97530 THERAPEUTIC ACTIVITIES: CPT | Mod: GP

## 2023-07-03 PROCEDURE — 250N000011 HC RX IP 250 OP 636: Mod: JZ | Performed by: HOSPITALIST

## 2023-07-03 PROCEDURE — 97166 OT EVAL MOD COMPLEX 45 MIN: CPT | Mod: GO

## 2023-07-03 PROCEDURE — 120N000001 HC R&B MED SURG/OB

## 2023-07-03 PROCEDURE — 36415 COLL VENOUS BLD VENIPUNCTURE: CPT | Performed by: INTERNAL MEDICINE

## 2023-07-03 PROCEDURE — 74230 X-RAY XM SWLNG FUNCJ C+: CPT

## 2023-07-03 PROCEDURE — 250N000013 HC RX MED GY IP 250 OP 250 PS 637: Performed by: HOSPITALIST

## 2023-07-03 PROCEDURE — 250N000011 HC RX IP 250 OP 636: Mod: JZ | Performed by: INTERNAL MEDICINE

## 2023-07-03 PROCEDURE — 92611 MOTION FLUOROSCOPY/SWALLOW: CPT | Mod: GN

## 2023-07-03 PROCEDURE — 80053 COMPREHEN METABOLIC PANEL: CPT | Performed by: INTERNAL MEDICINE

## 2023-07-03 RX ORDER — NALOXONE HYDROCHLORIDE 0.4 MG/ML
0.2 INJECTION, SOLUTION INTRAMUSCULAR; INTRAVENOUS; SUBCUTANEOUS
Status: DISCONTINUED | OUTPATIENT
Start: 2023-07-03 | End: 2023-07-06 | Stop reason: HOSPADM

## 2023-07-03 RX ORDER — MECLIZINE HCL 12.5 MG 12.5 MG/1
12.5 TABLET ORAL 2 TIMES DAILY
Qty: 30 TABLET | Refills: 0 | Status: SHIPPED | OUTPATIENT
Start: 2023-07-03 | End: 2023-07-21

## 2023-07-03 RX ORDER — NALOXONE HYDROCHLORIDE 0.4 MG/ML
0.4 INJECTION, SOLUTION INTRAMUSCULAR; INTRAVENOUS; SUBCUTANEOUS
Status: DISCONTINUED | OUTPATIENT
Start: 2023-07-03 | End: 2023-07-06 | Stop reason: HOSPADM

## 2023-07-03 RX ORDER — BARIUM SULFATE 400 MG/ML
SUSPENSION ORAL ONCE
Status: COMPLETED | OUTPATIENT
Start: 2023-07-03 | End: 2023-07-03

## 2023-07-03 RX ADMIN — LEVETIRACETAM 750 MG: 500 TABLET, FILM COATED ORAL at 22:05

## 2023-07-03 RX ADMIN — ENOXAPARIN SODIUM 40 MG: 40 INJECTION SUBCUTANEOUS at 10:42

## 2023-07-03 RX ADMIN — CEFTRIAXONE SODIUM 2 G: 2 INJECTION, POWDER, FOR SOLUTION INTRAMUSCULAR; INTRAVENOUS at 22:06

## 2023-07-03 RX ADMIN — ACETAMINOPHEN AND CODEINE PHOSPHATE 1 TABLET: 300; 30 TABLET ORAL at 10:42

## 2023-07-03 RX ADMIN — AZITHROMYCIN MONOHYDRATE 250 MG: 250 TABLET ORAL at 12:16

## 2023-07-03 RX ADMIN — GABAPENTIN 300 MG: 300 CAPSULE ORAL at 22:05

## 2023-07-03 RX ADMIN — ACETAMINOPHEN AND CODEINE PHOSPHATE 1 TABLET: 300; 30 TABLET ORAL at 04:20

## 2023-07-03 RX ADMIN — GABAPENTIN 300 MG: 300 CAPSULE ORAL at 07:58

## 2023-07-03 RX ADMIN — ACETAMINOPHEN AND CODEINE PHOSPHATE 1 TABLET: 300; 30 TABLET ORAL at 17:20

## 2023-07-03 RX ADMIN — ACETAMINOPHEN AND CODEINE PHOSPHATE 1 TABLET: 300; 30 TABLET ORAL at 23:43

## 2023-07-03 RX ADMIN — LEVETIRACETAM 750 MG: 500 TABLET, FILM COATED ORAL at 07:58

## 2023-07-03 RX ADMIN — TAMSULOSIN HYDROCHLORIDE 0.4 MG: 0.4 CAPSULE ORAL at 07:58

## 2023-07-03 RX ADMIN — BARIUM SULFATE 60 ML: 400 SUSPENSION ORAL at 09:47

## 2023-07-03 RX ADMIN — MIRTAZAPINE 15 MG: 15 TABLET, FILM COATED ORAL at 22:08

## 2023-07-03 RX ADMIN — TOPIRAMATE 50 MG: 25 TABLET, FILM COATED ORAL at 22:08

## 2023-07-03 RX ADMIN — PANTOPRAZOLE SODIUM 40 MG: 40 TABLET, DELAYED RELEASE ORAL at 07:58

## 2023-07-03 RX ADMIN — SENNOSIDES AND DOCUSATE SODIUM 1 TABLET: 50; 8.6 TABLET ORAL at 22:05

## 2023-07-03 RX ADMIN — OXCARBAZEPINE 450 MG: 150 TABLET, FILM COATED ORAL at 22:08

## 2023-07-03 RX ADMIN — QUETIAPINE FUMARATE 50 MG: 25 TABLET ORAL at 22:05

## 2023-07-03 ASSESSMENT — ACTIVITIES OF DAILY LIVING (ADL)
ADLS_ACUITY_SCORE: 24
ADLS_ACUITY_SCORE: 30
ADLS_ACUITY_SCORE: 24
ADLS_ACUITY_SCORE: 30
ADLS_ACUITY_SCORE: 24
ADLS_ACUITY_SCORE: 24

## 2023-07-03 NOTE — PROGRESS NOTES
"Speech-Language Pathology: Video Swallow Study     07/03/23 0900   Appointment Info   Signing Clinician's Name / Credentials (SLP) Nadeen Power MA CCC-SLP   General Information   Onset of Illness/Injury or Date of Surgery 07/01/23   Referring Physician Ebenezer Morales,    Pertinent History of Current Problem 78 year old female with a pertinent history of osteopenia, hyperlipidemia, trigeminal neuralgia, hypercalcemia, chronic pain syndrome, iron deficiency anemia due to chronic blood loss, malnutrition, and pyogenic brain abscess who presents to this ED by wheelchair for evaluation of generalized weakness and a cough that began yesterday. Patient reports that she has been unable to get to the bathroom on her own recently.   Patient also mentions that she has had 3 falls recently and her last fall was yesterday (6/30).   Patient's granddaughters mention that the patient has not been eating well recently too.   Patient endorses mild leg swelling.   Patient denies shortness of breath, chest pain, abdominal pain, vomiting, diarrhea, urinary symptoms, or any other concerns at this time.\" Chest x-ray confirmed PNA of R LL.   General Observations Alert and cooperative   Type of Evaluation   Type of Evaluation Swallow Evaluation   Oral Motor   Oral Musculature generally intact   Dentition (Oral Motor)   Comment, Dentition (Oral Motor) Upper implants with some bottom R teeth, but significant number of teeth missing; Pt reports consuming softer foods   Dentition (Oral Motor) significant number of missing teeth   Facial Symmetry (Oral Motor)   Facial Symmetry (Oral Motor) WNL   Lip Function (Oral Motor)   Comment, Lip Function (Oral Motor) WFL   Tongue Function (Oral Motor)   Comment, Tongue Function (Oral Motor) WFL   General Swallowing Observations   Past History of Dysphagia Dysphagia history with multiple VFSS 2/7/22, 7/20/22, & 9/15/2022 with a G-tube placement in 8/16/22. Most recent video swallow study " (9/15/2022) recommended soft and bite sized solids with thin liquids with strategies of chin tuck, hard swallow, small sips/bites, and slow rate. Pt with silent aspiration with thin liquids on previous videos. Patient reports she had no longer been tucking her chin to swallow at home.    Current Diet/Method of Nutritional Intake (General Swallowing Observations, NIS) mildly thick liquids (level 2);soft & bite-sized (level 6)   Swallowing Evaluation Videofluoroscopic swallow study (VFSS)   VFSS Evaluation   Radiologist Dr. Baer   Views Taken left lateral   Physical Location of Procedure [facility]   VFSS Textures Trialed pureed;solid foods;thin liquids;mildly thick liquids   VFSS Eval: Thin Liquid Texture Trial   Mode of Presentation, Thin Liquid cup;straw;self-fed   Preparatory Phase WFL;prolonged bolus preparation   Oral Phase, Thin Liquid WFL  (slow AP transit)   Bolus Location When Swallow Triggered pyriforms  (valleculae with chin tuck)   Pharyngeal Phase, Thin Liquid impaired pharyngoesophageal segment opening;impaired tongue base retraction   Rosenbek's Penetration Aspiration Scale: Thin Liquid Trial Results 3 - contrast remains above the vocal cords, visible residue remains (penetration)   Diagnostic Statement Risk of aspiration given presence of suprglottic stasis from penetration; no aspiration or penetration with chin tuck   VFSS Eval: Mildly Thick Liquids   Mode of Presentation spoon;cup;self-fed;fed by clinician   Preparatory Phase WFL   Oral Phase WFL  (slow AP transit)   Bolus Location When Swallow Triggered pyriforms  (valleculae with chin tuck)   Pharyngeal Phase impaired pharyngoesophageal segment opening;impaired tongue base retraction   Rosenbek's Penetration Aspiration Scale 3 - contrast remains above the vocal cords, visible residue remains (penetration)   Diagnostic Statement No penetration with chin tuck   VFSS Evaluation: Puree Solid Texture Trial   Mode of Presentation, Puree  spoon;fed by clinician   Preparatory Phase WFL   Oral Phase, Puree WFL  (slow AP transit)   Bolus Location When Swallow Triggered valleculae   Pharyngeal Phase, Puree impaired pharyngoesophageal segment opening   Rosenbek's Penetration Aspiration Scale: Puree Food Trial Results 1 - no aspiration, contrast does not enter airway   Diagnostic Statement PES stasis   VFSS Evaluation: Solid Food Texture Trial   Diagnostic Statement Patient spit out cracker due to difficulty masticating   Esophageal Phase of Swallow   Patient reports or presents with symptoms of esophageal dysphagia Yes   Esophageal sweep performed during today s vidofluoroscopic exam  Please refer to radiologist's report for details   Esophageal comments able to view stasis from puree in upper esophagus requiring time and numerous swallows of warm liquid to clear   Swallowing Recommendations   Diet Consistency Recommendations thin liquids (level 0);soft & bite-sized (level 6)  (with chin tuck)   Supervision Level for Intake distant supervision needed   Mode of Delivery Recommendations bolus size, small;slow rate of intake   Postural Recommendations chin tuck   Swallowing Maneuver Recommendations alternate food and liquid intake;double dry swallow  (dry swallows throughout intake)   Recommended Feeding/Eating Techniques (Swallow Eval) maintain upright sitting position for eating;maintain upright posture during/after eating for 30 minutes   Medication Administration Recommendations, Swallowing (SLP) consider crushed pending patient's preference and tolerance   Comment, Swallowing Recommendations No direct aspiration with thin or  mildly thick but risk noted due to penetration that does not clear. No aspiration or penetration with chin tuck posture. Significant upper esophageal stasis with puree texture, unclear impact of sticky barium paste vs dysmotility, suspect at least some component of dysmotility; this may further impact aspiration risk from  retrograde flow. Consider esophagram or EGD.   General Therapy Interventions   Planned Therapy Interventions Dysphagia Treatment   Dysphagia treatment Instruction of safe swallow strategies;Compensatory strategies for swallowing   Clinical Impression   Criteria for Skilled Therapeutic Interventions Met (SLP Eval) Yes, treatment indicated   SLP Diagnosis pharyngeal and esophageal dysphagia   Functional Limitations Related to Problem List (SLP) dentition   Risks & Benefits of therapy have been explained evaluation/treatment results reviewed;care plan/treatment goals reviewed;risks/benefits reviewed;current/potential barriers reviewed;participants voiced agreement with care plan;participants included;patient   Clinical Impression Comments Videofluoroscopic Swallow Study completed. Patient had no direct aspiration with thin or mildly thick liquids but at risk given penetration that does not clear.  Moderately deep penetration with thin and mildly thick without chin tuck, no penetration with chin tuck. Oral phase is slow and effortful but adequate for intake. Tongue base retraction was reduced. Swallow response was mildly delayed. Epiglottic inversion was complete.  Minimal stasis occurred with all intake with piecemeal swallow but clears. Hyolaryngeal elevation was adequate and hyolaryngeal excursion was adequate.  Mastication was not fully tested, cracker was spit out. Cricopharyngeus was prominent with concern for small bar vs web, most consistent with small web. Significant upper eosphageal stasis noted requiring numerous swallows of warm water and time to eventually pass. Patient is at risk of retrograde flow aspiration due to severity of stasis and length of time to pass.  She may benefit from a mostly liquid diet to compensate.   SLP Total Evaluation Time   Evaluation, videofluoroscopic eval of swallow function Minutes (38804) 15   SLP Goals   Therapy Frequency (SLP Eval) 5 times/wk   SLP Predicted Duration/Target  Date for Goal Attainment 07/09/23   SLP Goals Swallow;SLP Goal 1   SLP: Safely tolerate diet without signs/symptoms of aspiration Soft & bite sized diet;Thin liquids;With use of compensatory swallow strategies;With assistance/supervision;With use of swallow precautions   SLP: Goal 1 Patient will utilize strategies of chin tuck and alternate solids and liquids jose angel following training   Interventions   Interventions Quick Adds Swallowing Dysfunction   Swallowing Dysfunction &/or Oral Function for Feeding   Treatment of Swallowing Dysfunction &/or Oral Function for Feeding Minutes (56129) 10   Treatment Detail/Skilled Intervention Instructed patient on chin tuck posture and alternating solids and liquids as important strategies to reduce aspiration risk and esophageal stasis. She was able to jose angel complete chin tuck posture after initial instruction; suspect patient will need further education to recall strategy but able to complete. Introduced strategy of alternating solids and liquids. Patient verbalized understanding.   SLP Discharge Planning   SLP Plan diet f/u with emphasis on chin tuck and alternating solids and liquids, educated on warm liquid wash; determine LRD texture-suspect soft and bite size   SLP Discharge Recommendation home with home care speech therapy   SLP Rationale for DC Rec dysphagia with good carryover of swallow strategies necessary   SLP Brief overview of current status  Soft and Bite Sized and Thin liquids with strict use of chin tuck and alternating solids and liquids. VFSS showed penetration with thin and mildly thick that does not clear and signifcant upper eosphageal stasis with puree.   Total Session Time   Total Session Time (sum of timed and untimed services) 25

## 2023-07-03 NOTE — PROGRESS NOTES
Care Management Follow Up    Length of Stay (days): 1    Expected Discharge Date: 07/05/2023     Concerns to be Addressed:     Discharge planning  Patient plan of care discussed at interdisciplinary rounds: Yes    Anticipated Discharge Disposition:  TCU     Anticipated Discharge Services:  Per therapy  Anticipated Discharge DME:  Per therapy    Patient/family educated on Medicare website which has current facility and service quality ratings:  yes  Education Provided on the Discharge Plan:  Yes, TCU facilities  Patient/Family in Agreement with the Plan:  yes    Referrals Placed by CM/SW:  Patient and family making choices  Private pay costs discussed: not at this time    Additional Information:  RNCM met with patient in patient's room.  Introduced self and identified role.  Explained recommendation for TCU, patient is in agreement.  Provided [facility] SNF list. Patient would prefer the [place] area, though wants to discuss with family before she makes a decision.  Provided CM number for patient to call when she has made choices.  CM to follow up with patient tomorrow.        Lizzette Lee RN

## 2023-07-03 NOTE — PLAN OF CARE
Goal Outcome Evaluation:       Pt is alert and oriented x4. Reports 5-8 pain in head. Tylenol has desired effect. On 1L nasal cannula and SaO2 >93%. VSS. Assist x2 up to commode. Voided spontaneously this evening. Tolerated IV antibiotics. Crackles in upper lobes of both lungs. No nausea or vomiting reported this shift.      Problem: Plan of Care - These are the overarching goals to be used throughout the patient stay.    Goal: Absence of Hospital-Acquired Illness or Injury  Intervention: Identify and Manage Fall Risk  Recent Flowsheet Documentation  Taken 7/2/2023 1600 by Hafsa Spivey, RN  Safety Promotion/Fall Prevention: activity supervised  Intervention: Prevent Skin Injury  Recent Flowsheet Documentation  Taken 7/2/2023 1728 by Hafsa Spivey, RN  Body Position: supine, legs elevated

## 2023-07-03 NOTE — PLAN OF CARE
Goal Outcome Evaluation:    Problem: Pneumonia  Goal: Fluid Balance  Outcome: Progressing  Goal: Resolution of Infection Signs and Symptoms  Outcome: Progressing  Goal: Effective Oxygenation and Ventilation  Outcome: Progressing  Intervention: Promote Airway Secretion Clearance  Recent Flowsheet Documentation  Taken 7/2/2023 2345 by Candy Dorman, RN  Cough And Deep Breathing: done with encouragement  Intervention: Optimize Oxygenation and Ventilation  Recent Flowsheet Documentation  Taken 7/2/2023 2345 by Candy Dorman, RN  Head of Bed (HOB) Positioning: HOB at 15 degrees          A&Ox4. Patient cough is weak, having difficulty producing the ordered sputum sample. Mild thick liquids for aspiration risk. On 1LPM O2 via NC to maintain SaO2 >90% while sleeping. Afebrile, coarse crackles in lungs.     /64 (BP Location: Right arm)   Pulse 72   Temp 98.4  F (36.9  C) (Oral)   Resp 20   Wt 50.3 kg (111 lb)   SpO2 93%   BMI 18.47 kg/m           4:10 Bladder scanned 240cc

## 2023-07-03 NOTE — PLAN OF CARE
Problem: Plan of Care - These are the overarching goals to be used throughout the patient stay.    Goal: Absence of Hospital-Acquired Illness or Injury  Intervention: Identify and Manage Fall Risk  Recent Flowsheet Documentation  Taken 7/3/2023 0756 by Dayna Bruner RN  Safety Promotion/Fall Prevention:   activity supervised   assistive device/personal items within reach     Problem: Plan of Care - These are the overarching goals to be used throughout the patient stay.    Goal: Absence of Hospital-Acquired Illness or Injury  Intervention: Prevent Infection  Recent Flowsheet Documentation  Taken 7/3/2023 0756 by Dayna Bruner RN  Infection Prevention:   hand hygiene promoted   personal protective equipment utilized     Problem: Pneumonia  Goal: Resolution of Infection Signs and Symptoms  Intervention: Prevent Infection Progression  Recent Flowsheet Documentation  Taken 7/3/2023 0756 by Dayna Bruner RN  Isolation Precautions: contact precautions maintained   Goal Outcome Evaluation:       Patient alert and oriented. Oxygen now @ 1 L per NC. Fluid now upgraded to thin liquids after swallow study. Prn tylenol #3 given for pain which was effective per patient.

## 2023-07-03 NOTE — PROGRESS NOTES
07/03/23 1000   Appointment Info   Signing Clinician's Name / Credentials (OT) Rea Abebe OTR/L   Living Environment   People in Home alone   Current Living Arrangements house   Home Accessibility no concerns   Self-Care   Usual Activity Tolerance fair   Equipment Currently Used at Home wheelchair, manual;shower chair;grab bar, tub/shower;raised toilet seat  (grab bars by toilet)   Fall history within last six months yes   Activity/Exercise/Self-Care Comment spends alot of time on couch but feels she has been less active than she should have been, not walking much. States bed too high to get into it. Assist with bathing   Instrumental Activities of Daily Living (IADL)   IADL Comments family does cooking, laundry, meds. mgmt.  (Family comes everyday, but stays alone at night)   General Information   Onset of Illness/Injury or Date of Surgery 07/01/23   Referring Physician Ebenezer Morales, DO   Patient/Family Therapy Goal Statement (OT) I want to get strong -get more therapy at home   Existing Precautions/Restrictions seizures   Cognitive Status Examination   Orientation Status orientation to person, place and time;other (see comments)   Cognitive Status Comments states she has difficulty recalling info, difficulty with her memory   Visual Perception   Visual Impairment/Limitations corrective lenses full-time   Pain Assessment   Patient Currently in Pain Yes, see Vital Sign flowsheet  (face pain constantly)   Posture   Posture forward head position;protracted shoulders;kyphosis   Posture Comments difficulty tilting hesd to drink   Range of Motion Comprehensive   General Range of Motion no range of motion deficits identified   Bed Mobility   Bed Mobility supine-sit   Supine-Sit [place] (Bed Mobility) minimum assist (75% patient effort)   Transfers   Transfers bed-chair transfer;sit-stand transfer   Transfer Comments oxygen 1L 97%   Transfer Skill: Bed to Chair/Chair to Bed   Bed-Chair [place]  (Transfers) moderate assist (50% patient effort)   Assistive Device (Bed-Chair Transfers)   (none)   Sit-Stand Transfer   Sit-Stand [place] (Transfers) minimum assist (75% patient effort)   Assistive Device (Sit-Stand Transfers)   (none)   Balance   Balance Assessment standing balance: static;standing balance: dynamic   Balance Comments min-mod assist   Lower Body Dressing Assessment/Training   [place] Level (Lower Body Dressing) maximum assist (25% patient effort);assist of 2   Toileting   [place] Level (Toileting) maximum assist (25% patient effort)   Clinical Impression   Criteria for Skilled Therapeutic Interventions Met (OT) Yes, treatment indicated   OT Diagnosis decreased ADL's due to pneumonia   OT Problem List-Impairments impacting ADL activity tolerance impaired;balance;cognition;pain;strength;mobility   Assessment of Occupational Performance 3-5 Performance Deficits   Identified Performance Deficits toileting, transfers, L/E dressing   Planned Therapy Interventions (OT) ADL retraining;balance training;cognition;bed mobility training;progressive activity/exercise;transfer training;strengthening   Clinical Decision Making Complexity (OT) moderate complexity   Anticipated Equipment Needs Upon Discharge (OT)   (has nec. equip)   Risk & Benefits of therapy have been explained evaluation/treatment results reviewed;patient   Clinical Impression Comments Pt. demonstrates decreased ADL's due to weakness with sig. fall risk. Recommend continued OT for strengthening and progression of ADL's using safety prec./equip. and assist with discharge planning. Pt. lives alone with sig. family support but presently in need of 24 hour care.   OT Total Evaluation Time   OT Eval, Moderate Complexity Minutes (56937) 30   OT Goals   Therapy Frequency (OT) Daily   OT Predicted Duration/Target Date for Goal Attainment 07/10/23   OT Goals Transfers;Toilet Transfer/Toileting;Lower Body Dressing;Cognition   OT: Lower  Body Dressing Minimal assist   OT: Transfer Minimal assist   OT: Toilet Transfer/Toileting Minimal assist   OT: Cognitive Patient/caregiver will verbalize understanding of cognitive assessment results/recommendations as needed for safe discharge planning   Self-Care/Home Management   Self-Care/Home Mgmt/ADL, Compensatory, Meal Prep Minutes (34295) 10   Symptoms Noted During/After Treatment (Meal Preparation/Planning Training) fatigue;increased pain   Treatment Detail/Skilled Intervention instructed pt. in proper steps (hand placement) with multiple stands and  transfers in room to both commode and chair. Pt. high fall risk   OT Discharge Planning   OT Plan bed mobility, standing, transfers, exercises, SLUMS   OT Discharge Recommendation (DC Rec) (S)  Transitional Care Facility   OT Rationale for DC Rec Pt. needs assist of 1 for transfers and ADL due to weakness/decreased safety fall risk-lives alone   OT Brief overview of current status max assist of 1-2 for toileting/L/E dressing, mod assist transfers   Total Session Time   Timed Code Treatment Minutes 10   Total Session Time (sum of timed and untimed services) 40

## 2023-07-03 NOTE — PROGRESS NOTES
[facility]    Medicine Progress Note - Hospitalist Service    Date of Admission:  7/1/2023    Assessment & Plan   Julisa Chaney is a 78 year old female admitted on 7/1/2023.         Community acquired pneumonia  Acute respiratory failure with hypoxia  Sepsis  -rocephin+zithromycin  -blood cultures ngtd  -COVID negative  -swallow study     Hypokalemia  -replaced and monitor per protocol     Seizure d/o  -keppra     History of migraine  -trileptal and topamax     depression  -remeron and seroquel    Chronic left facial pain:  -?trigeminnal neuralgia vs other.  -CRP high in setting of PNA.  ESR 32.  -monitor for any sx's to suggest GCA  -increase neurontin to 300mg BID  -continue chronic meds     Diet: Combination Diet Regular Diet Adult; Soft and Bite Sized Diet (level 6); Thin Liquids (level 0)    DVT Prophylaxis: Enoxaparin (Lovenox) SQ  Abrams Catheter: Not present  Lines: None     Cardiac Monitoring: None  Code Status: Full Code      Clinically Significant Risk Factors        # Hypokalemia: Lowest K = 3.1 mmol/L in last 2 days, will replace as needed    # Hypercalcemia: corrected calcium is >10.1, will monitor as appropriate    # Hypoalbuminemia: Lowest albumin = 2.7 g/dL at 7/3/2023  5:05 AM, will monitor as appropriate                     Disposition Plan     Expected Discharge Date: 07/05/2023                  Ebenezer Morales DO, DO  Hospitalist Service  [facility]  Securely message with [employer] (more info)  Text page via [employer] Paging/Directory   ______________________________________________________________________    Interval History   Afebrile. Events noted    Physical Exam   Vital Signs: Temp: 98.4  F (36.9  C) Temp src: Oral BP: 121/58 Pulse: 71   Resp: 16 SpO2: 96 % O2 Device: Nasal cannula Oxygen Delivery: 1 LPM  Weight: 112 lbs 14.01 oz    Physical Examination:   General appearance - no distress  Eyes - sclera anicteric  Lungs - decreased bases, bll  crackles R>L, no wheezing  Heart - normal rate, regular rhythm, normal S1, S2, no murmurs, rubs, clicks or gallops. No peripheral edema.  Abdomen - soft, nontender, nondistended, BS+  Neurological - alert, oriented, normal speech  Skin - no c/c/p    Lab/imaging reviewed

## 2023-07-03 NOTE — PROGRESS NOTES
RESPIRATORY CARE NOTE     Patient Name: Julisa Chaney  Today's Date: 7/3/2023       Supplemental oxygen has titrated from 1LNC down to RA, pt still able to maintain adequate saturations. BS: fine crackles RML and decreased @ bases. RT will continue to monitor closely and assess as needed.     Veronica Pablo RRT    WENT IN AND SPOKE WITH PATIENT VIA  LINE. ENCOURAGED PATIENT TO
AMBULATE TO BEDSIDE CHAIR. ALSO ENCOURAGED PATIENT TO COUGH AS SHE FEELS LIKE
SHE HAS "SOMETHING IN HER THROAT". PATIENT DOES NOT WANT TO COUGH BECAUSE IT
HURTS. PILLOW GIVEN TO PLACE OVER ABDOMEN WITH INSTRUCTIONS TO HOLD PILLOW ON
ABDOMEN WHILE COUGHING TO EASE PAIN. EDUCATED PATIENT THAT SHE NEEDS TO COUGH
IN ORDER TO PREVENT POST-OP PNEUMONIA. PATIENT RELUCTANTLY COUGHING. PATIENT
DECLINES TO GET OUT OF BED AT THIS TIME. INSTRUCTED PATIENT THAT SHE CAN REST
FOR JUST A LITTLE WHILE LONGER, BUT WHEN BREAKFAST COMES SHE WILL NEED TO GET
OUT OF BED AND AMBULATE SO SHE CAN GO HOME. PATIENT VERBALIZES UNDERSTANDING
OF INSTRUCTIONS. ALSO ENCOURAGED P.O. HYDRATION. PATIENT VERBALIZES
UNDERSTANDING.

## 2023-07-04 ENCOUNTER — APPOINTMENT (OUTPATIENT)
Dept: SPEECH THERAPY | Facility: HOSPITAL | Age: 78
DRG: 871 | End: 2023-07-04
Payer: COMMERCIAL

## 2023-07-04 LAB
ANION GAP SERPL CALCULATED.3IONS-SCNC: 10 MMOL/L (ref 7–15)
BASOPHILS # BLD AUTO: 0.1 10E3/UL (ref 0–0.2)
BASOPHILS NFR BLD AUTO: 1 %
BUN SERPL-MCNC: 7.5 MG/DL (ref 8–23)
CALCIUM SERPL-MCNC: 8.6 MG/DL (ref 8.8–10.2)
CHLORIDE SERPL-SCNC: 104 MMOL/L (ref 98–107)
CREAT SERPL-MCNC: 0.56 MG/DL (ref 0.51–0.95)
DEPRECATED HCO3 PLAS-SCNC: 28 MMOL/L (ref 22–29)
EOSINOPHIL # BLD AUTO: 0.3 10E3/UL (ref 0–0.7)
EOSINOPHIL NFR BLD AUTO: 3 %
ERYTHROCYTE [DISTWIDTH] IN BLOOD BY AUTOMATED COUNT: 14.2 % (ref 10–15)
GFR SERPL CREATININE-BSD FRML MDRD: >90 ML/MIN/1.73M2
GLUCOSE SERPL-MCNC: 84 MG/DL (ref 70–99)
HCT VFR BLD AUTO: 36.8 % (ref 35–47)
HGB BLD-MCNC: 11.6 G/DL (ref 11.7–15.7)
IMM GRANULOCYTES # BLD: 0 10E3/UL
IMM GRANULOCYTES NFR BLD: 0 %
LYMPHOCYTES # BLD AUTO: 1.4 10E3/UL (ref 0.8–5.3)
LYMPHOCYTES NFR BLD AUTO: 16 %
MCH RBC QN AUTO: 27.5 PG (ref 26.5–33)
MCHC RBC AUTO-ENTMCNC: 31.5 G/DL (ref 31.5–36.5)
MCV RBC AUTO: 87 FL (ref 78–100)
MONOCYTES # BLD AUTO: 0.6 10E3/UL (ref 0–1.3)
MONOCYTES NFR BLD AUTO: 7 %
NEUTROPHILS # BLD AUTO: 6.3 10E3/UL (ref 1.6–8.3)
NEUTROPHILS NFR BLD AUTO: 73 %
NRBC # BLD AUTO: 0 10E3/UL
NRBC BLD AUTO-RTO: 0 /100
NT-PROBNP SERPL-MCNC: 546 PG/ML (ref 0–1800)
PLATELET # BLD AUTO: 297 10E3/UL (ref 150–450)
POTASSIUM SERPL-SCNC: 2.8 MMOL/L (ref 3.4–5.3)
POTASSIUM SERPL-SCNC: 3.5 MMOL/L (ref 3.4–5.3)
RBC # BLD AUTO: 4.22 10E6/UL (ref 3.8–5.2)
SODIUM SERPL-SCNC: 142 MMOL/L (ref 136–145)
WBC # BLD AUTO: 8.6 10E3/UL (ref 4–11)

## 2023-07-04 PROCEDURE — 999N000158 HC STATISTIC RCP TIME ED VENT EA 10 MIN

## 2023-07-04 PROCEDURE — 36415 COLL VENOUS BLD VENIPUNCTURE: CPT | Performed by: INTERNAL MEDICINE

## 2023-07-04 PROCEDURE — 250N000013 HC RX MED GY IP 250 OP 250 PS 637: Performed by: INTERNAL MEDICINE

## 2023-07-04 PROCEDURE — 85025 COMPLETE CBC W/AUTO DIFF WBC: CPT | Performed by: INTERNAL MEDICINE

## 2023-07-04 PROCEDURE — 80048 BASIC METABOLIC PNL TOTAL CA: CPT | Performed by: INTERNAL MEDICINE

## 2023-07-04 PROCEDURE — 83880 ASSAY OF NATRIURETIC PEPTIDE: CPT | Performed by: INTERNAL MEDICINE

## 2023-07-04 PROCEDURE — 250N000011 HC RX IP 250 OP 636: Mod: JZ | Performed by: HOSPITALIST

## 2023-07-04 PROCEDURE — 999N000157 HC STATISTIC RCP TIME EA 10 MIN

## 2023-07-04 PROCEDURE — 84132 ASSAY OF SERUM POTASSIUM: CPT | Performed by: INTERNAL MEDICINE

## 2023-07-04 PROCEDURE — 250N000011 HC RX IP 250 OP 636: Mod: JZ | Performed by: INTERNAL MEDICINE

## 2023-07-04 PROCEDURE — 120N000001 HC R&B MED SURG/OB

## 2023-07-04 PROCEDURE — 92526 ORAL FUNCTION THERAPY: CPT | Mod: GN | Performed by: SPEECH-LANGUAGE PATHOLOGIST

## 2023-07-04 PROCEDURE — 250N000013 HC RX MED GY IP 250 OP 250 PS 637: Performed by: HOSPITALIST

## 2023-07-04 PROCEDURE — 99232 SBSQ HOSP IP/OBS MODERATE 35: CPT | Performed by: INTERNAL MEDICINE

## 2023-07-04 RX ORDER — POTASSIUM CHLORIDE 1.5 G/1.58G
20 POWDER, FOR SOLUTION ORAL ONCE
Status: DISCONTINUED | OUTPATIENT
Start: 2023-07-04 | End: 2023-07-04

## 2023-07-04 RX ORDER — POTASSIUM CHLORIDE 1.5 G/1.58G
40 POWDER, FOR SOLUTION ORAL ONCE
Status: DISCONTINUED | OUTPATIENT
Start: 2023-07-04 | End: 2023-07-04

## 2023-07-04 RX ORDER — POTASSIUM CHLORIDE 1500 MG/1
20 TABLET, EXTENDED RELEASE ORAL ONCE
Status: COMPLETED | OUTPATIENT
Start: 2023-07-04 | End: 2023-07-04

## 2023-07-04 RX ORDER — POTASSIUM CHLORIDE 1500 MG/1
40 TABLET, EXTENDED RELEASE ORAL ONCE
Status: COMPLETED | OUTPATIENT
Start: 2023-07-04 | End: 2023-07-04

## 2023-07-04 RX ADMIN — TAMSULOSIN HYDROCHLORIDE 0.4 MG: 0.4 CAPSULE ORAL at 08:08

## 2023-07-04 RX ADMIN — MIRTAZAPINE 15 MG: 15 TABLET, FILM COATED ORAL at 21:17

## 2023-07-04 RX ADMIN — GABAPENTIN 300 MG: 300 CAPSULE ORAL at 21:16

## 2023-07-04 RX ADMIN — ACETAMINOPHEN AND CODEINE PHOSPHATE 1 TABLET: 300; 30 TABLET ORAL at 16:04

## 2023-07-04 RX ADMIN — TOPIRAMATE 50 MG: 25 TABLET, FILM COATED ORAL at 21:17

## 2023-07-04 RX ADMIN — ACETAMINOPHEN AND CODEINE PHOSPHATE 1 TABLET: 300; 30 TABLET ORAL at 22:38

## 2023-07-04 RX ADMIN — POTASSIUM CHLORIDE 40 MEQ: 1500 TABLET, EXTENDED RELEASE ORAL at 16:03

## 2023-07-04 RX ADMIN — PANTOPRAZOLE SODIUM 40 MG: 40 TABLET, DELAYED RELEASE ORAL at 08:08

## 2023-07-04 RX ADMIN — ENOXAPARIN SODIUM 40 MG: 40 INJECTION SUBCUTANEOUS at 12:20

## 2023-07-04 RX ADMIN — POTASSIUM CHLORIDE 20 MEQ: 1500 TABLET, EXTENDED RELEASE ORAL at 18:06

## 2023-07-04 RX ADMIN — LEVETIRACETAM 750 MG: 500 TABLET, FILM COATED ORAL at 08:08

## 2023-07-04 RX ADMIN — LEVETIRACETAM 750 MG: 500 TABLET, FILM COATED ORAL at 21:16

## 2023-07-04 RX ADMIN — AZITHROMYCIN MONOHYDRATE 250 MG: 250 TABLET ORAL at 08:08

## 2023-07-04 RX ADMIN — CEFTRIAXONE SODIUM 2 G: 2 INJECTION, POWDER, FOR SOLUTION INTRAMUSCULAR; INTRAVENOUS at 21:15

## 2023-07-04 RX ADMIN — GABAPENTIN 300 MG: 300 CAPSULE ORAL at 08:08

## 2023-07-04 RX ADMIN — SENNOSIDES AND DOCUSATE SODIUM 1 TABLET: 50; 8.6 TABLET ORAL at 21:16

## 2023-07-04 RX ADMIN — QUETIAPINE FUMARATE 50 MG: 25 TABLET ORAL at 21:16

## 2023-07-04 RX ADMIN — ACETAMINOPHEN AND CODEINE PHOSPHATE 1 TABLET: 300; 30 TABLET ORAL at 08:08

## 2023-07-04 RX ADMIN — OXCARBAZEPINE 450 MG: 150 TABLET, FILM COATED ORAL at 21:17

## 2023-07-04 ASSESSMENT — ACTIVITIES OF DAILY LIVING (ADL)
ADLS_ACUITY_SCORE: 30

## 2023-07-04 NOTE — PROGRESS NOTES
Care Management Follow Up     Length of Stay (days): 2     Expected Discharge Date: 07/05/2023     Concerns to be Addressed:     Discharge planning  Patient plan of care discussed at interdisciplinary rounds: Yes     Anticipated Discharge Disposition:  TCU     Anticipated Discharge Services:  Per therapy  Anticipated Discharge DME:  Per therapy     Patient/family educated on Medicare website which has current facility and service quality ratings:  yes  Education Provided on the Discharge Plan:  Yes, TCU facilities  Patient/Family in Agreement with the Plan:  yes     Referrals Placed by CM/SW:  Patient and family making choices  Private pay costs discussed: not at this time     Additional Information:  SWCM met with patient in patient's room.  Introduced self and identified role. Reminded patient of recommendation for TCU, which she was agreeable to yesterday, and patient now is in not in agreement. Patient states she has had home care in the past and would prefer to discharge home with home care instead. Patient cannot recall name of agency. Patient states she is willing to discuss all this with her daughter. This writer called patient's daughter and left message with her to call to discuss - Alissa [phone].     AAMIR Tapia

## 2023-07-04 NOTE — CONSULTS
GASTROENTEROLOGY CONSULTATION      Julisa Chaney  [address]  [place] [address]  78 year old female     Admission Date/Time: 7/1/2023  Primary Care Provider: Mara Murillo  Referring / Attending Physician: Dr. Morales     We were asked to see the patient in consultation by Dr. Morales for evaluation of dysphagia.        HPI:  Julisa Chaney is a 78 year old female with medical history of seizure disorder, depression, dysphagia, currently hospitalized with pneumonia.  Gastroenterology was consulted given dysphagia with a swallow study that revealed significant upper esophageal stasis and possible dysmotility.    Patient reports she had trouble swallowing for years.  If she avoids trigger foods such as meat and then spreads she tends to be okay.  If she eats too fast she will typically choke on her food.  If food does get stuck in her throat she will drink water slowly and this will help it pass.  She has been told to tuck her chin when she eats but often forgets to do this.  There is no pain when she swallows.  There is no chest pain and no abdominal pain.  No nausea no vomiting.  The swallow study did not reveal any direct aspiration with thin liquids but some risk due to penetration that does not clear.    Patient denies that she has had an upper endoscopy in the past.  No evidence of melena or hematochezia.  No hematemesis.  She does not use NSAIDs.  She does not typically struggle with gastroesophageal reflux.  Patient was a regular cigarette smoker up until about 10 years ago.       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  Loosing wt, as appetite is not much       Abnormal chest CT 08/16/2020     Priority: Medium     CT 8/8/20-Tree-in-bud opacities involving the right upper and right middle   lobes of the lung are suspicious for an infectious/inflammatory process.   There is a more nodular appearing opacity in the right upper lobe on axial   image 138 of series 4 for which a   follow-up CT examination in three months is recommended.             Chronic pain syndrome      Priority: Medium     Iron deficiency anemia due to chronic blood loss 08/08/2020     Priority: Medium     Added automatically from request for surgery [id number]      Formatting of this note might be different from the original.  Added automatically from request for surgery [id number]       Hypercalcemia 02/06/2019     Priority: Medium     Controlled substance agreement signed 6/1/23 07/09/2018     Priority: Medium     Osteopenia      Priority: Medium     Created by Conversion  Replacement Utility updated for latest IMO load      Formatting of this note might be different from the original.  Created by Conversion    Replacement Utility updated for latest IMO load       Migraine headache      Priority: Medium     Had MRI of head done feb 2022, for new left side weakness  Following neurologist   IMPRESSION:  1.  No evidence of acute large territorial infarction, acute intracranial hemorrhage, or mass lesion.  2.  Stable chronic bilateral cerebellar infarcts.  3.  Stable mild chronic small vessel ischemic change and mild diffuse brain parenchymal volume loss.       Counseling regarding advanced directives and goals of care 10/14/2014     Priority: Medium     Trigeminal neuralgia      Priority:  Medium     Currently following neurologist table on trileptal , nortriptyline,kepra, topamax, and does take tylenol#3 daily       Hyperlipidemia      Priority: Medium     Created by Conversion      Formatting of this note might be different from the original.  Created by Conversion            ROS: A comprehensive ten point review of systems was negative aside from those in mentioned in the HPI.       MEDICATIONS:   Prior to Admission medications    Medication Sig Start Date End Date Taking? Authorizing Provider   acetaminophen-codeine (TYLENOL #3) 300-30 MG per tablet Take 1 tablet by mouth every 6 hours as needed for breakthrough pain or severe pain 5/30/23  Yes Mara Murillo MD   desonide (DESOWEN) 0.05 % cream [DESONIDE (DESOWEN) 0.05 % CREAM] Apply to affected area 2 times daily  Patient taking differently: Apply topically 2 times daily as needed (to sores as needed) 5/4/21  Yes Mara Murillo MD   ferrous sulfate (FEROSUL) 325 (65 Fe) MG tablet Take 1 tablet (325 mg) by mouth daily (with breakfast) 12/21/22  Yes Mara Murillo MD   gabapentin (NEURONTIN) 100 MG capsule Take 1 capsule 100mg in the morning and 3 capsules 300mg in the evening  Patient taking differently: Take 100 mg by mouth every morning 6/23/23  Yes Ashwini Butler MD   gabapentin (NEURONTIN) 300 MG capsule Take 1 capsule (300 mg) by mouth 3 times daily for 90 days  Patient taking differently: Take 300 mg by mouth every evening 6/1/23 8/30/23 Yes Mara Murillo MD   levETIRAcetam (KEPPRA) 750 MG tablet TAKE 1 TABLET(750 MG) BY MOUTH TWICE DAILY 6/11/23  Yes Mara Murillo MD   mirtazapine (REMERON) 15 MG tablet TAKE 1 TABLET(15 MG) BY MOUTH AT BEDTIME 5/21/23  Yes Mara Murillo MD   Multiple Vitamin (MULTIVITAMIN) TABS Take 1 tablet by mouth daily 12/1/22  Yes Mara Murillo MD   nortriptyline (PAMELOR) 50 MG capsule Take 1 capsule (50 mg) by mouth 2 times daily 1/11/23  Yes Mara Murillo MD   omeprazole (PRILOSEC) 40 MG DR capsule Take 1  capsule (40 mg) by mouth daily 22  Yes Mara Murillo MD   OXcarbazepine (TRILEPTAL) 150 MG tablet TAKE 3 TABLETS(450 MG) BY MOUTH AT BEDTIME 22  Yes Mara Murillo MD   polyvinyl alcohol (ARTIFICIAL TEARS) 1.4 % ophthalmic solution Place 1 drop into both eyes every hour as needed for dry eyes 22  Yes Gemini Montenegro MD   QUEtiapine (SEROQUEL) 50 MG tablet Take 1 tablet (50 mg) by mouth At Bedtime 22  Yes Mara Murillo MD   senna-docusate (STIMULANT LAXATIVE) 8.6-50 MG tablet TAKE 1 TABLET BY MOUTH AT BEDTIME 23  Yes Mara Murillo MD   topiramate (TOPAMAX) 50 MG tablet TAKE 1 TABLET(50 MG) BY MOUTH AT BEDTIME 23  Yes Mara Murillo MD   Vitamin D3 50 mcg (2000 units) tablet Take 1 tablet (50 mcg) by mouth daily 22  Yes Mara Murillo MD   meclizine (ANTIVERT) 12.5 MG tablet Take 1 tablet (12.5 mg) by mouth 2 times daily 7/3/23   Mara Murillo MD        ALLERGIES:   Allergies   Allergen Reactions     Contrast [Iohexol] Rash     Noticed during 2020 admission. Received IV contrast on      Chocolate Headache     Contrast Dye      Penicillins Rash        SOCIAL HISTORY:  Social History     Tobacco Use     Smoking status: Former     Packs/day: 1.00     Years: 50.00     Pack years: 50.00     Types: Cigarettes     Quit date: 2014     Years since quittin.6     Smokeless tobacco: Never   Substance Use Topics     Alcohol use: No     Drug use: No        FAMILY HISTORY:  Family History   Problem Relation Age of Onset     Cancer Father      Testicular cancer Grandchild 17.00     Breast Cancer Mother      Breast Cancer Sister      Breast Cancer Maternal Aunt      Breast Cancer Sister         PHYSICAL EXAM:     BP (!) 149/73 (BP Location: Right arm)   Pulse 87   Temp 98.9  F (37.2  C) (Oral)   Resp 18   Wt 55.7 kg (122 lb 12.7 oz)   SpO2 91%   BMI 20.43 kg/m       PHYSICAL EXAM:  GENERAL:  NAD  SKIN: no suspicious lesions, rashes, jaundice  HEAD: Normocephalic.  Atraumatic.  NECK: Neck supple. No adenopathy.   EYES: No scleral icterus  GASTROINTESTINAL: soft, non tender, non distended, no guarding/rebound  JOINT/EXTREMITIES:  no gross deformities noted, normal muscle tone  NEURO: CN 2-12 grossly intact, no focal deficits  PSYCH: Normal affect        ADDITIONAL COMMENTS:   I reviewed the patient's new clinical lab test results.     Recent Labs   Lab 07/04/23  1013 07/03/23  0505 07/02/23  0629 07/01/23  2342   WBC 8.6 14.1* 18.0* 22.1*   RBC 4.22 4.15 4.16 4.63   HGB 11.6* 11.1* 11.2* 12.6   HCT 36.8 37.3 37.0 41.1   MCV 87 90 89 89   MCH 27.5 26.7 26.9 27.2   MCHC 31.5 29.8* 30.3* 30.7*   RDW 14.2 14.3 14.3 14.2    216 259 291     Recent Labs   Lab Test 07/04/23  1013 07/03/23  0505 07/02/23  0629   POTASSIUM 2.8* 3.5 3.5   CHLORIDE 104 107 108*   CO2 28 27 29   BUN 7.5* 12.3 11.9   ANIONGAP 10 10 8     Recent Labs   Lab Test 07/03/23  0505 07/02/23  0629 07/02/23  0407 06/01/23  1636 07/21/22  0931 07/19/22 2017 07/19/22  1901 02/10/22  0752 02/07/22  1102   ALBUMIN 2.7* 3.0*  --  4.1   < >  --  3.6   < >  --    BILITOTAL 0.2 0.2  --  0.2   < >  --  0.5   < >  --    ALT 8 9  --  <5*   < >  --  31   < >  --    AST 20 15  --  17   < >  --  25   < >  --    PROTEIN  --   --  10*  --   --  20*  --   --  Negative   LIPASE  --   --   --   --   --   --  <9  --   --     < > = values in this interval not displayed.        CONSULTATION ASSESSMENT AND PLAN:    Julisa Chaney is a 78 year old with medical history of seizure disorder, depression, dysphagia, currently hospitalized with pneumonia.  Gastroenterology was consulted given dysphagia with a swallow study that revealed significant upper esophageal stasis and possible dysmotility.    1.  Dysphagia: Suspected dysmotility on speech swallow study.  Patient has been struggling with this for a number of years.  She does have a history of cigarette smoking as well.  Dysmotility on swallow study could be related to  presbyesophagus or achalasia.  Given current pneumonia would not suggest upper endoscopy at this time.      -- Recommend outpatient EGD once stable from a pulmonary standpoint.  -- Continue PPI 40 mg daily.      I discussed the patient plan with Dr. Smalls, GI staff physician. Thank you for asking us to participate in the care of this patient.     40 min of total time was spent providing patient care, including patient evaluation, reviewing documentation/ test results, and .     Sarika Chavez PA-C  [facility] ( [facility])

## 2023-07-04 NOTE — PROGRESS NOTES
[facility]    Medicine Progress Note - Hospitalist Service    Date of Admission:  7/1/2023    Assessment & Plan   Julisa Chaney is a 78 year old female admitted on 7/1/2023.         Community acquired pneumonia  Possible Aspiration Pneumonia  Acute respiratory failure with hypoxia  Sepsis  -rocephin+zithromycin  -blood cultures ngtd  -COVID negative   -swallow study reviewed.  SLP recommended GI consult and consideration of EGD.  GI consult pending  -diet per SLP  -HOB 30 degrees  -wean off O2 as able  -bronchial hygiene     Hypokalemia  -replaced and monitor per protocol     Seizure d/o  -stable  -keppra     History of migraine  -trileptal and topamax     Depression  -stable  -remeron and seroquel    Chronic left facial pain:  -?trigeminnal neuralgia vs other.  -CRP high in setting of PNA.  ESR 32.  -monitor for any sx's to suggest GCA which currently aren't present  -increased neurontin to 300mg BID  -continue chronic meds       Diet: Combination Diet Regular Diet Adult; Soft and Bite Sized Diet (level 6); Thin Liquids (level 0)    DVT Prophylaxis: Enoxaparin (Lovenox) SQ  Abrams Catheter: Not present  Lines: None     Cardiac Monitoring: None  Code Status: Full Code      Clinically Significant Risk Factors           # Hypercalcemia: corrected calcium is >10.1, will monitor as appropriate    # Hypoalbuminemia: Lowest albumin = 2.7 g/dL at 7/3/2023  5:05 AM, will monitor as appropriate                     Disposition Plan     Expected Discharge Date: 07/05/2023                  Ebenezer Morales DO, DO  Hospitalist Service  [facility]  Securely message with [employer] (more info)  Text page via [employer] Paging/Directory   ______________________________________________________________________    Interval History   Events noted.    Physical Exam   Vital Signs: Temp: 98.9  F (37.2  C) Temp src: Oral BP: (!) 149/73 Pulse: 87   Resp: 18 SpO2: 92 % O2 Device: Nasal cannula  Oxygen Delivery: 1/2 LPM  Weight: 122 lbs 12.74 oz    General appearance - no distress  Eyes - sclera anicteric  Lungs - decreased bases, faint bibasilar crackles, no wheezing  Heart - normal rate, regular rhythm,  No peripheral edema.  Abdomen - soft, nontender, nondistended, BS+  Neurological - alert, oriented, normal speech  Skin - no c/c/p     Lab/imaging reviewed

## 2023-07-04 NOTE — PLAN OF CARE
Problem: Pneumonia  Goal: Fluid Balance  Outcome: Progressing     Problem: Pneumonia  Goal: Resolution of Infection Signs and Symptoms  Outcome: Progressing     Goal Outcome Evaluation:    Vital signs stable. Weaned to room air. C/o headache this AM which resolved with Tylenol #3. Up assist x 1 with walker and gait belt. K+ 2.8. Potassium replacement protocol initiated.     Awaiting GI consult.

## 2023-07-04 NOTE — PLAN OF CARE
Problem: Plan of Care - These are the overarching goals to be used throughout the patient stay.    Goal: Optimal Comfort and Wellbeing  Outcome: Progressing     Problem: Risk for Delirium  Goal: Improved Sleep  Outcome: Progressing     Problem: Pneumonia  Goal: Resolution of Infection Signs and Symptoms  Intervention: Prevent Infection Progression  Recent Flowsheet Documentation  Taken 7/4/2023 0100 by Ashwini Centeno RN  Isolation Precautions: contact precautions maintained  Pt has had a good night.  Reported that head pain earlier was gone after given T#3.  Pt's rocephin infused after having a new iv placed.  Pt remains on 0.5 liters oxygen and sats in the mid 90's.  Pt denies shortness of breath while at rest.  Pt with weak occasional cough but non-productive.  Lung sounds diminished with some fine crackles throughout.

## 2023-07-04 NOTE — PLAN OF CARE
Problem: Plan of Care - These are the overarching goals to be used throughout the patient stay.    Goal: Absence of Hospital-Acquired Illness or Injury  Outcome: Progressing  Intervention: Identify and Manage Fall Risk  Recent Flowsheet Documentation  Taken 7/3/2023 1705 by Rick Key RN  Safety Promotion/Fall Prevention:   activity supervised   assistive device/personal items within reach   clutter free environment maintained   lighting adjusted   mobility aid in reach   nonskid shoes/slippers when out of bed   patient and family education   safety round/check completed   supervised activity     Problem: Plan of Care - These are the overarching goals to be used throughout the patient stay.    Goal: Optimal Comfort and Wellbeing  Outcome: Progressing  Intervention: Monitor Pain and Promote Comfort  Recent Flowsheet Documentation  Taken 7/3/2023 1720 by Rick Key RN  Pain Management Interventions: medication (see MAR)  Taken 7/3/2023 1700 by Rick Key RN  Pain Management Interventions: medication offered but refused     Problem: Risk for Delirium  Goal: Optimal Coping  Outcome: Progressing  Goal: Improved Behavioral Control  Outcome: Progressing  Goal: Improved Attention and Thought Clarity  Outcome: Progressing  Goal: Improved Sleep  Outcome: Progressing     Problem: Pneumonia  Goal: Fluid Balance  Outcome: Progressing  Goal: Resolution of Infection Signs and Symptoms  Outcome: Progressing  Goal: Effective Oxygenation and Ventilation  Outcome: Progressing     Pt alert and oriented. Pt c/o headache and was given PRN acetaminophen-codeine at 1720 and 2343 for face pain and headache. New PIV placed by PICC. Denied SOB or dyspnea. O2 sat 92-94% on 0.5 L via NC. Pt is an assist of 1 to bedside commode.

## 2023-07-04 NOTE — PROGRESS NOTES
Room air SpO2 91 %, BS crackles RUL RML. Flutter valve performed PEP 4, good technique, BS unchanged after. Encouraged pt to continue to perform independently Q 1 hr w/a. RT to follow    BP (!) 149/73 (BP Location: Right arm)   Pulse 87   Temp 98.9  F (37.2  C) (Oral)   Resp 18   Wt 55.7 kg (122 lb 12.7 oz)   SpO2 91%   BMI 20.43 kg/m

## 2023-07-05 ENCOUNTER — APPOINTMENT (OUTPATIENT)
Dept: OCCUPATIONAL THERAPY | Facility: HOSPITAL | Age: 78
DRG: 871 | End: 2023-07-05
Payer: COMMERCIAL

## 2023-07-05 ENCOUNTER — APPOINTMENT (OUTPATIENT)
Dept: PHYSICAL THERAPY | Facility: HOSPITAL | Age: 78
DRG: 871 | End: 2023-07-05
Payer: COMMERCIAL

## 2023-07-05 ENCOUNTER — APPOINTMENT (OUTPATIENT)
Dept: SPEECH THERAPY | Facility: HOSPITAL | Age: 78
DRG: 871 | End: 2023-07-05
Payer: COMMERCIAL

## 2023-07-05 LAB
ALBUMIN SERPL BCG-MCNC: 2.6 G/DL (ref 3.5–5.2)
ALP SERPL-CCNC: 115 U/L (ref 35–104)
ALT SERPL W P-5'-P-CCNC: <5 U/L (ref 0–50)
ANION GAP SERPL CALCULATED.3IONS-SCNC: 9 MMOL/L (ref 7–15)
AST SERPL W P-5'-P-CCNC: 12 U/L (ref 0–45)
BASOPHILS # BLD AUTO: 0.1 10E3/UL (ref 0–0.2)
BASOPHILS NFR BLD AUTO: 1 %
BILIRUB SERPL-MCNC: 0.2 MG/DL
BUN SERPL-MCNC: 6.1 MG/DL (ref 8–23)
CALCIUM SERPL-MCNC: 9.2 MG/DL (ref 8.8–10.2)
CHLORIDE SERPL-SCNC: 107 MMOL/L (ref 98–107)
CREAT SERPL-MCNC: 0.61 MG/DL (ref 0.51–0.95)
DEPRECATED HCO3 PLAS-SCNC: 26 MMOL/L (ref 22–29)
EOSINOPHIL # BLD AUTO: 0.3 10E3/UL (ref 0–0.7)
EOSINOPHIL NFR BLD AUTO: 4 %
ERYTHROCYTE [DISTWIDTH] IN BLOOD BY AUTOMATED COUNT: 13.9 % (ref 10–15)
GFR SERPL CREATININE-BSD FRML MDRD: >90 ML/MIN/1.73M2
GLUCOSE SERPL-MCNC: 89 MG/DL (ref 70–99)
HCT VFR BLD AUTO: 35.5 % (ref 35–47)
HGB BLD-MCNC: 10.9 G/DL (ref 11.7–15.7)
IMM GRANULOCYTES # BLD: 0 10E3/UL
IMM GRANULOCYTES NFR BLD: 0 %
LYMPHOCYTES # BLD AUTO: 1.5 10E3/UL (ref 0.8–5.3)
LYMPHOCYTES NFR BLD AUTO: 23 %
MCH RBC QN AUTO: 27.3 PG (ref 26.5–33)
MCHC RBC AUTO-ENTMCNC: 30.7 G/DL (ref 31.5–36.5)
MCV RBC AUTO: 89 FL (ref 78–100)
MONOCYTES # BLD AUTO: 0.6 10E3/UL (ref 0–1.3)
MONOCYTES NFR BLD AUTO: 9 %
NEUTROPHILS # BLD AUTO: 4.2 10E3/UL (ref 1.6–8.3)
NEUTROPHILS NFR BLD AUTO: 63 %
NRBC # BLD AUTO: 0 10E3/UL
NRBC BLD AUTO-RTO: 0 /100
PLATELET # BLD AUTO: 256 10E3/UL (ref 150–450)
POTASSIUM SERPL-SCNC: 3.6 MMOL/L (ref 3.4–5.3)
PROT SERPL-MCNC: 5.7 G/DL (ref 6.4–8.3)
RBC # BLD AUTO: 4 10E6/UL (ref 3.8–5.2)
SODIUM SERPL-SCNC: 142 MMOL/L (ref 136–145)
WBC # BLD AUTO: 6.7 10E3/UL (ref 4–11)

## 2023-07-05 PROCEDURE — 97535 SELF CARE MNGMENT TRAINING: CPT | Mod: GO

## 2023-07-05 PROCEDURE — 97116 GAIT TRAINING THERAPY: CPT | Mod: GP

## 2023-07-05 PROCEDURE — G0463 HOSPITAL OUTPT CLINIC VISIT: HCPCS

## 2023-07-05 PROCEDURE — 250N000013 HC RX MED GY IP 250 OP 250 PS 637: Performed by: INTERNAL MEDICINE

## 2023-07-05 PROCEDURE — 250N000011 HC RX IP 250 OP 636: Mod: JZ | Performed by: INTERNAL MEDICINE

## 2023-07-05 PROCEDURE — 250N000011 HC RX IP 250 OP 636: Mod: JZ | Performed by: HOSPITALIST

## 2023-07-05 PROCEDURE — 85025 COMPLETE CBC W/AUTO DIFF WBC: CPT | Performed by: INTERNAL MEDICINE

## 2023-07-05 PROCEDURE — 92526 ORAL FUNCTION THERAPY: CPT | Mod: GN

## 2023-07-05 PROCEDURE — 272N000202 HC AEROBIKA WITH MANOMETER

## 2023-07-05 PROCEDURE — 99232 SBSQ HOSP IP/OBS MODERATE 35: CPT | Performed by: INTERNAL MEDICINE

## 2023-07-05 PROCEDURE — 250N000013 HC RX MED GY IP 250 OP 250 PS 637: Performed by: HOSPITALIST

## 2023-07-05 PROCEDURE — 120N000001 HC R&B MED SURG/OB

## 2023-07-05 PROCEDURE — 36415 COLL VENOUS BLD VENIPUNCTURE: CPT | Performed by: INTERNAL MEDICINE

## 2023-07-05 PROCEDURE — 999N000157 HC STATISTIC RCP TIME EA 10 MIN

## 2023-07-05 PROCEDURE — 80053 COMPREHEN METABOLIC PANEL: CPT | Performed by: INTERNAL MEDICINE

## 2023-07-05 PROCEDURE — 97530 THERAPEUTIC ACTIVITIES: CPT | Mod: GO

## 2023-07-05 RX ADMIN — LEVETIRACETAM 750 MG: 500 TABLET, FILM COATED ORAL at 08:41

## 2023-07-05 RX ADMIN — GABAPENTIN 300 MG: 300 CAPSULE ORAL at 08:41

## 2023-07-05 RX ADMIN — TAMSULOSIN HYDROCHLORIDE 0.4 MG: 0.4 CAPSULE ORAL at 08:41

## 2023-07-05 RX ADMIN — ACETAMINOPHEN 650 MG: 325 TABLET ORAL at 15:27

## 2023-07-05 RX ADMIN — OXCARBAZEPINE 450 MG: 150 TABLET, FILM COATED ORAL at 21:02

## 2023-07-05 RX ADMIN — AZITHROMYCIN MONOHYDRATE 250 MG: 250 TABLET ORAL at 08:41

## 2023-07-05 RX ADMIN — GABAPENTIN 300 MG: 300 CAPSULE ORAL at 20:48

## 2023-07-05 RX ADMIN — SENNOSIDES AND DOCUSATE SODIUM 1 TABLET: 50; 8.6 TABLET ORAL at 21:02

## 2023-07-05 RX ADMIN — ACETAMINOPHEN 650 MG: 325 TABLET ORAL at 20:54

## 2023-07-05 RX ADMIN — CEFTRIAXONE SODIUM 2 G: 2 INJECTION, POWDER, FOR SOLUTION INTRAMUSCULAR; INTRAVENOUS at 21:04

## 2023-07-05 RX ADMIN — PANTOPRAZOLE SODIUM 40 MG: 40 TABLET, DELAYED RELEASE ORAL at 08:41

## 2023-07-05 RX ADMIN — ACETAMINOPHEN 650 MG: 325 TABLET ORAL at 08:41

## 2023-07-05 RX ADMIN — ENOXAPARIN SODIUM 40 MG: 40 INJECTION SUBCUTANEOUS at 12:17

## 2023-07-05 RX ADMIN — MIRTAZAPINE 15 MG: 15 TABLET, FILM COATED ORAL at 21:03

## 2023-07-05 RX ADMIN — TOPIRAMATE 50 MG: 25 TABLET, FILM COATED ORAL at 21:04

## 2023-07-05 RX ADMIN — LEVETIRACETAM 750 MG: 500 TABLET, FILM COATED ORAL at 20:48

## 2023-07-05 RX ADMIN — QUETIAPINE FUMARATE 50 MG: 25 TABLET ORAL at 21:03

## 2023-07-05 ASSESSMENT — ACTIVITIES OF DAILY LIVING (ADL)
ADLS_ACUITY_SCORE: 30

## 2023-07-05 NOTE — PLAN OF CARE
Problem: Plan of Care - These are the overarching goals to be used throughout the patient stay.    Goal: Optimal Comfort and Wellbeing  Outcome: Progressing     Problem: Pain Acute  Goal: Optimal Pain Control and Function  Outcome: Progressing  Intervention: Prevent or Manage Pain  Recent Flowsheet Documentation  Taken 7/5/2023 0100 by Abhijeet Boudreaux, RN  Sensory Stimulation Regulation: care clustered     Problem: Pneumonia  Goal: Effective Oxygenation and Ventilation  Outcome: Progressing  Intervention: Promote Airway Secretion Clearance  Recent Flowsheet Documentation  Taken 7/5/2023 0100 by Abhijeet Boudreaux RN  Cough And Deep Breathing: done with encouragement     Patient is alert and oriented. Denies n/v. Assist x1. Pain managed with prn tylenol. Slept most of the night. VSS. K protocol, 3.6. Recheck lab tomorrow

## 2023-07-05 NOTE — PLAN OF CARE
Problem: Plan of Care - These are the overarching goals to be used throughout the patient stay.    Goal: Absence of Hospital-Acquired Illness or Injury  Intervention: Identify and Manage Fall Risk  Recent Flowsheet Documentation  Taken 7/4/2023 1600 by Dayna Bruner RN  Safety Promotion/Fall Prevention:   activity supervised   assistive device/personal items within reach   clutter free environment maintained   nonskid shoes/slippers when out of bed   patient and family education   room near nurse's station     Problem: Plan of Care - These are the overarching goals to be used throughout the patient stay.    Goal: Absence of Hospital-Acquired Illness or Injury  Intervention: Prevent and Manage VTE (Venous Thromboembolism) Risk  Recent Flowsheet Documentation  Taken 7/4/2023 1600 by Dayna Bruner RN  VTE Prevention/Management: SCDs (sequential compression devices) off     Problem: Risk for Delirium  Goal: Improved Behavioral Control  Intervention: Prevent and Manage Agitation  Recent Flowsheet Documentation  Taken 7/4/2023 1600 by Dayna Bruner RN  Environment Familiarity/Consistency: daily routine followed     Problem: Risk for Delirium  Goal: Improved Behavioral Control  Intervention: Minimize Safety Risk  Recent Flowsheet Documentation  Taken 7/4/2023 1600 by Dayna Bruner RN  Enhanced Safety Measures: room near unit station     Problem: Risk for Delirium  Goal: Improved Attention and Thought Clarity  Intervention: Maximize Cognitive Function  Recent Flowsheet Documentation  Taken 7/4/2023 1600 by Dayna Bruner RN  Sensory Stimulation Regulation: television on  Reorientation Measures: clock in view   Goal Outcome Evaluation:       Received x 2 dioses of Tylenol #3 which helped per patient. No BM this shift. On room air oxygen sats 90-94%

## 2023-07-05 NOTE — TREATMENT PLAN
RCAT Treatment Plan    Patient Score: 10  Patient Acuity: 4    Clinical Indication for Therapy: rhonchi on auscultation    Therapy Ordered: flutter valve QID, IS while awake     Assessment Summary: Patient is a former smoker with no pulmonary history. Patient remains in room air. No respiratory distress. BBS coarse crackles. Patient reports nonproductive cough. Patient is not able to effectively perform IS (~250 ml). Patient tolerates flutter valve well. Patient is able to demonstrate good and effective techniques with flutter valve. Continue to encourage deep breathing and coughing techniques. Will change flutter valve to PRN.    Carol Reece, RT  7/5/2023

## 2023-07-05 NOTE — PLAN OF CARE
Problem: Pneumonia  Goal: Fluid Balance  Outcome: Progressing   Right lung sounds course crackles. Encouraging ad reeducating pt on IS. Pt states understanding. Pt maintaining >92% oxygen on RA.    Pt reporting headache, PRN tylenol given with some relief.     JOCELYNE TAYLOR RN

## 2023-07-05 NOTE — PLAN OF CARE
Problem: Plan of Care - These are the overarching goals to be used throughout the patient stay.    Goal: Plan of Care Review  Description: The Plan of Care Review/Shift note should be completed every shift.  The Outcome Evaluation is a brief statement about your assessment that the patient is improving, declining, or no change.  This information will be displayed automatically on your shift note.  Outcome: Progressing   Goal Outcome Evaluation:       Pt medicated with tylenol for left sided face pain. Up in chair for meals.

## 2023-07-05 NOTE — PROGRESS NOTES
[facility]    Medicine Progress Note - Hospitalist Service    Date of Admission:  7/1/2023    Assessment & Plan   Julisa Chaney is a 78 year old female admitted on 7/1/2023.         Community acquired pneumonia  Possible Aspiration Pneumonia  Acute respiratory failure with hypoxia  Sepsis  -rocephin+zithromycin  -blood cultures ngtd  -COVID negative   -swallow study reviewed.  SLP recommended GI consult and consideration of EGD.   - GI consult: outpt EGD; PPI  -diet per SLP  -HOB 30 degrees  -wean off O2 as able  -bronchial hygiene     Hypokalemia  -replaced and monitor per protocol     Seizure d/o  -stable  -keppra     History of migraine  -trileptal and topamax     Depression  -stable  -remeron and seroquel    Chronic left facial pain:  -?trigeminnal neuralgia vs other.  -CRP high in setting of PNA.  ESR 32.  -monitor for any sx's to suggest GCA which currently aren't present  -increased neurontin to 300mg BID  -continue chronic meds         Diet: Combination Diet Regular Diet Adult; Soft and Bite Sized Diet (level 6); Thin Liquids (level 0)    DVT Prophylaxis: Enoxaparin (Lovenox) SQ  Abrams Catheter: Not present  Lines: None     Cardiac Monitoring: None  Code Status: Full Code      Clinically Significant Risk Factors        # Hypokalemia: Lowest K = 2.8 mmol/L in last 2 days, will replace as needed    # Hypercalcemia: corrected calcium is >10.1, will monitor as appropriate    # Hypoalbuminemia: Lowest albumin = 2.6 g/dL at 7/5/2023  5:55 AM, will monitor as appropriate                     Disposition Plan      Expected Discharge Date: 07/06/2023    Discharge Delays: IV Medication - consider oral or Home Infusion  Oxygen Needs - Arrange Home O2    Discharge Comments: home vs TCU TBD          Elana Warren MD  Hospitalist Service  [facility]  Securely message with [employer] (more info)  Text page via [employer] Paging/[employer]    ______________________________________________________________________    Interval History   No new complaints, c/o general weakness, no sob, no f/c, no n/v; occasional dry cough    Physical Exam   Vital Signs: Temp: 98.9  F (37.2  C) Temp src: Oral BP: 132/58 Pulse: 96   Resp: 20 SpO2: 90 % O2 Device: None (Room air)    Weight: 122 lbs 12.74 oz    General.  Awake alert oriented not in acute distress.  HEENT.  Pupils equal round react to light, anicteric, EOM intact.  Neck supple no JVD.  CVS regular rhythm no murmur gallops.  Lungs.  Clear to auscultation bilateral no wheezing or rales.  Abdomen.  Soft nontender bowel sounds present.  Extremities.  No edema no calf tenderness.  Neurological.  General weakness. No focal deficit.  Skin no rash. No pallor.  Psych. Normal mood.     Medical Decision Making       45 MINUTES SPENT BY ME on the date of service doing chart review, history, exam, documentation & further activities per the note.      Data     I have personally reviewed the following data over the past 24 hrs:    6.7  \   10.9 (L)   / 256     142 107 6.1 (L) /  89   3.6 26 0.61 \       ALT: <5 AST: 12 AP: 115 (H) TBILI: 0.2   ALB: 2.6 (L) TOT PROTEIN: 5.7 (L) LIPASE: N/A       Imaging results reviewed over the past 24 hrs:   No results found for this or any previous visit (from the past 24 hour(s)).

## 2023-07-05 NOTE — PROGRESS NOTES
GASTROENTEROLOGY PROGRESS NOTE     SUBJECTIVE   The patient reports feeling better. She follows recommendations from speech and believes this is helpful (chin tuck), Tolerating soft foods.      OBJECTIVE     Vitals Blood pressure 115/59, pulse 85, temperature 98.2  F (36.8  C), temperature source Oral, resp. rate 16, weight 55.7 kg (122 lb 12.7 oz), SpO2 91 %, not currently breastfeeding.          Physical Exam   General: awake, alert, responds appropriately    Cardiovascular: S1S2    Chest: lungs are clear     Abdomen: +bs, soft, not tender    Neurologic: grossly intact        LABORATORY    ELECTROLYTE PANEL   Recent Labs   Lab 07/05/23  0555 07/04/23  2156 07/04/23  1013 07/03/23  0505     --  142 144   POTASSIUM 3.6 3.5 2.8* 3.5   CHLORIDE 107  --  104 107   CO2 26  --  28 27   GLC 89  --  84 92   CR 0.61  --  0.56 0.59   BUN 6.1*  --  7.5* 12.3      HEMATOLOGY PANEL   Recent Labs   Lab 07/05/23  0555 07/04/23  1013 07/03/23  0505   HGB 10.9* 11.6* 11.1*   MCV 89 87 90   WBC 6.7 8.6 14.1*    297 216      LIVER AND PANCREAS PANEL   Recent Labs   Lab 07/05/23  0555 07/03/23  0505 07/02/23  0629   AST 12 20 15   ALT <5 8 9   ALKPHOS 115* 124* 114*   BILITOTAL 0.2 0.2 0.2     IMAGING STUDIES        I have reviewed the current diagnostic and laboratory tests.              IMPRESSION   Dysphagia-- This is a 79 yo female with history of seizure disorder, depression, and many year history of dysphagia who was admitted 7/1/23 and found to have pneumonia. Swallow study revealed significant upper esophageal stasis and possible dysmotility. Will plan to complete treatment for pneumonia and proceed with outpatient EGD for further evaluation of possible dysmotility (?presbyesophagus or achalasia).           PLAN   Azithromycin and Ceftriaxone per primary team.  Continue ppi.   Will arrange for outpatient EGD (after completing treatment for pneumonia). [facility] will contact the patient for scheduling.  Appreciate  input from SLP/michelle Power MA CCC-SLP-- patient should be encouraged to follow recs made by SLP.   [facility] will sign off.         Viky Montgomery PA-C  Thank you for the opportunity to participate in the care of this patient.   Please feel free to call me with any questions or concerns.  Phone number [phone].

## 2023-07-05 NOTE — PROGRESS NOTES
"Care Management Follow Up    Length of Stay (days): 3    Expected Discharge Date: 07/06/2023     Concerns to be Addressed:  pulm status, TCU acceptance, Emily wilde       Patient plan of care discussed at interdisciplinary rounds: Yes    Anticipated Discharge Disposition:  TCU     Anticipated Discharge Services:  TCU    Anticipated Discharge DME:  O2    Patient/family educated on Medicare website which has current facility and service quality ratings:  yes  Education Provided on the Discharge Plan:  yes  Patient/Family in Agreement with the Plan:  yes    Referrals Placed by CM/SW:  TCU    Private pay costs discussed:  [facility]    Additional Information:  Patient with history of osteopenia, hyperlipidemia, trigeminal neuralgia, hypercalcemia, chronic pain syndrome, iron deficiency anemia due to chronic blood loss, malnutrition, and pyogenic brain abscess who presents to this ED by wheelchair for evaluation of generalized weakness and a cough.  IV antibiotic, O2 1L.    Therapy:  PT: SBA for bed mobility ,min assist sit -stand,Amb 15 feet with FWW,min assist. Recommending TCU.  OT: max assist of 1-2 for toileting/L/E dressing, mod assist transfers. Recommending TCU.       Social History:  Julisa lives in a house and daughter Alissa living with her to help with some ADLs and all IADLs. \"My daughter helps with stand by assist when I shower and then with anything else I need. I can usually dress myself\".  \"I mostly use the wheelchair all the time. I do use the walker for short distances like into the bathroom at home\".   Family to transport at discharge.    Met with patient to discuss recommendation for TCU. She states agreement and preference is for assisted (she has been here before) or TCU in [place]. She request CM also call her daughter Alissa. Spoke with Alissa who agrees with plan for TCU preferably in [place]. Referrals faxed. Alissa states she would be able to transport patient if she is off O2. "   Emily wilde request for TCU sent.     3:42 PM Accepted at [facility] TCU.       Sara Navarrete RN

## 2023-07-05 NOTE — PROGRESS NOTES
"CLINICAL NUTRITION SERVICES - ASSESSMENT NOTE    Nutrition Prescription    RECOMMENDATIONS FOR MDs/PROVIDERS TO ORDER:    Malnutrition Status:    Not noted    Recommendations already ordered by Registered Dietitian (RD):  None at this time    Future/Additional Recommendations:  Follow for LOS     REASON FOR ASSESSMENT  Julisa Chaney is a/an 78 year old female assessed by the dietitian for Admission Nutrition Risk Screen for positive weight loss unsure, poor appetite PTA.  Weight history review, no significant weight loss noted.     ANTHROPOMETRICS  Height: 5'5\"  Most Recent Weight: 55.7 kg (122 lb 12.7 oz)    IBW: 56.8 kg  BMI: Normal BMI  Weight History:   Wt Readings from Last 20 Encounters:   07/04/23 55.7 kg (122 lb 12.7 oz)   02/01/23 49.2 kg (108 lb 9 oz)   12/01/22 50.3 kg (110 lb 12.8 oz)   11/02/22 50.3 kg (110 lb 12.8 oz)   10/26/22 51.5 kg (113 lb 9.6 oz)   10/19/22 52.4 kg (115 lb 9.6 oz)   10/13/22 52.1 kg (114 lb 12.8 oz)   10/11/22 53.5 kg (118 lb)   10/03/22 62.5 kg (137 lb 12.6 oz)   07/26/22 52.3 kg (115 lb 3.2 oz)   05/19/22 54.9 kg (121 lb)   02/17/22 54.3 kg (119 lb 12.8 oz)   02/10/22 53.6 kg (118 lb 1.6 oz)   05/04/21 51.4 kg (113 lb 4.8 oz)   08/21/20 49.9 kg (110 lb)   08/15/20 51.8 kg (114 lb 3.2 oz)   08/15/20 51.8 kg (114 lb 3.2 oz)   08/15/20 51.8 kg (114 lb 3.2 oz)   06/15/20 59 kg (130 lb)   01/06/20 59.9 kg (132 lb)     INTERVENTIONS  Implementation  Follow for LOS  "

## 2023-07-06 ENCOUNTER — APPOINTMENT (OUTPATIENT)
Dept: SPEECH THERAPY | Facility: HOSPITAL | Age: 78
DRG: 871 | End: 2023-07-06
Payer: COMMERCIAL

## 2023-07-06 VITALS
TEMPERATURE: 98.1 F | SYSTOLIC BLOOD PRESSURE: 132 MMHG | RESPIRATION RATE: 18 BRPM | DIASTOLIC BLOOD PRESSURE: 81 MMHG | BODY MASS INDEX: 20.43 KG/M2 | WEIGHT: 122.8 LBS | OXYGEN SATURATION: 93 % | HEART RATE: 78 BPM

## 2023-07-06 PROBLEM — F32.9 MAJOR DEPRESSION: Status: ACTIVE | Noted: 2021-05-04

## 2023-07-06 PROBLEM — R33.9 URINARY RETENTION: Status: ACTIVE | Noted: 2023-07-06

## 2023-07-06 PROBLEM — R29.6 FALLS FREQUENTLY: Status: ACTIVE | Noted: 2023-07-06

## 2023-07-06 LAB — POTASSIUM SERPL-SCNC: 4.7 MMOL/L (ref 3.4–5.3)

## 2023-07-06 PROCEDURE — 250N000013 HC RX MED GY IP 250 OP 250 PS 637: Performed by: INTERNAL MEDICINE

## 2023-07-06 PROCEDURE — 92526 ORAL FUNCTION THERAPY: CPT | Mod: GN

## 2023-07-06 PROCEDURE — 36415 COLL VENOUS BLD VENIPUNCTURE: CPT | Performed by: INTERNAL MEDICINE

## 2023-07-06 PROCEDURE — 250N000011 HC RX IP 250 OP 636: Mod: JZ | Performed by: INTERNAL MEDICINE

## 2023-07-06 PROCEDURE — 99239 HOSP IP/OBS DSCHRG MGMT >30: CPT | Performed by: INTERNAL MEDICINE

## 2023-07-06 PROCEDURE — 250N000013 HC RX MED GY IP 250 OP 250 PS 637: Performed by: HOSPITALIST

## 2023-07-06 PROCEDURE — 84132 ASSAY OF SERUM POTASSIUM: CPT | Performed by: INTERNAL MEDICINE

## 2023-07-06 RX ORDER — CEFDINIR 300 MG/1
300 CAPSULE ORAL EVERY 12 HOURS SCHEDULED
Status: DISCONTINUED | OUTPATIENT
Start: 2023-07-06 | End: 2023-07-06 | Stop reason: HOSPADM

## 2023-07-06 RX ORDER — CEFDINIR 300 MG/1
300 CAPSULE ORAL 2 TIMES DAILY
Qty: 3 CAPSULE | Refills: 0 | Status: SHIPPED | OUTPATIENT
Start: 2023-07-06 | End: 2023-07-08

## 2023-07-06 RX ORDER — TAMSULOSIN HYDROCHLORIDE 0.4 MG/1
0.4 CAPSULE ORAL DAILY
Qty: 30 CAPSULE | Refills: 0 | Status: SHIPPED | OUTPATIENT
Start: 2023-07-07 | End: 2023-07-21

## 2023-07-06 RX ORDER — ACETAMINOPHEN AND CODEINE PHOSPHATE 300; 30 MG/1; MG/1
1 TABLET ORAL EVERY 6 HOURS PRN
Qty: 12 TABLET | Refills: 0 | Status: SHIPPED | OUTPATIENT
Start: 2023-07-06 | End: 2023-07-10

## 2023-07-06 RX ORDER — GABAPENTIN 300 MG/1
300 CAPSULE ORAL 2 TIMES DAILY
Qty: 60 CAPSULE | Refills: 0 | Status: SHIPPED | OUTPATIENT
Start: 2023-07-06 | End: 2023-07-21

## 2023-07-06 RX ADMIN — ENOXAPARIN SODIUM 40 MG: 40 INJECTION SUBCUTANEOUS at 11:29

## 2023-07-06 RX ADMIN — ACETAMINOPHEN AND CODEINE PHOSPHATE 1 TABLET: 300; 30 TABLET ORAL at 00:38

## 2023-07-06 RX ADMIN — LEVETIRACETAM 750 MG: 500 TABLET, FILM COATED ORAL at 08:51

## 2023-07-06 RX ADMIN — PANTOPRAZOLE SODIUM 40 MG: 40 TABLET, DELAYED RELEASE ORAL at 08:51

## 2023-07-06 RX ADMIN — AZITHROMYCIN MONOHYDRATE 250 MG: 250 TABLET ORAL at 08:51

## 2023-07-06 RX ADMIN — ACETAMINOPHEN AND CODEINE PHOSPHATE 1 TABLET: 300; 30 TABLET ORAL at 09:09

## 2023-07-06 RX ADMIN — GABAPENTIN 300 MG: 300 CAPSULE ORAL at 08:51

## 2023-07-06 RX ADMIN — TAMSULOSIN HYDROCHLORIDE 0.4 MG: 0.4 CAPSULE ORAL at 08:51

## 2023-07-06 RX ADMIN — CEFDINIR 300 MG: 300 CAPSULE ORAL at 11:28

## 2023-07-06 ASSESSMENT — ACTIVITIES OF DAILY LIVING (ADL)
ADLS_ACUITY_SCORE: 30

## 2023-07-06 NOTE — PLAN OF CARE
Problem: Plan of Care - These are the overarching goals to be used throughout the patient stay.    Goal: Optimal Comfort and Wellbeing  Outcome: Progressing     Problem: Pneumonia  Goal: Fluid Balance  Outcome: Progressing   Goal Outcome Evaluation:  Patient is alert and oriented x4, makes her needs known. Medicated with Tylenol #3 for left facial pain with good results. Reminded to call nursing staff with needs, call light within reach.

## 2023-07-06 NOTE — PLAN OF CARE
Physical Therapy Discharge Summary    Reason for therapy discharge:    Discharged to transitional care facility.    Progress towards therapy goal(s). See goals on Care Plan in [facility] electronic health record for goal details.  Goals not met.  Barriers to achieving goals:   discharge from facility.    Therapy recommendation(s):    Continued therapy is recommended.  Rationale/Recommendations:  to improve functional mobility and safety with ambulation.  :

## 2023-07-06 NOTE — PLAN OF CARE
Occupational Therapy Discharge Summary    Reason for therapy discharge:    Discharged to TCU.    Progress towards therapy goal(s). See goals on Care Plan in [facility] electronic health record for goal details.  Making progress towards goals/not met.    Therapy recommendation(s):    Pt could benefit from further OT @ TCU.

## 2023-07-06 NOTE — PROGRESS NOTES
Care Management Discharge Note    Discharge Date: 07/06/2023       Discharge Disposition: Transitional Care    Discharge Services: None    Discharge DME: None    Discharge Transportation: [facility]     Private pay costs discussed: potential transportation cost    Does the patient's insurance plan have a 3 day qualifying hospital stay waiver?   n/a    PAS Confirmation Code: [id number]  Patient/family educated on Medicare website which has current facility and service quality ratings:  yes  Education Provided on the Discharge Plan:  Yes  Persons Notified of Discharge Plans: yes    Patient/Family in Agreement with the Plan: yes    Handoff Referral Completed:  yes    Additional Information:    Patient discharging to [facility] TCU. Emily wilde received [id number]  For 7/6-7/12.        Sara Navarrete RN

## 2023-07-06 NOTE — PROGRESS NOTES
Pt used restroom prior to discharge. Personal belongings & TCU paperwork given to transport staff. Ax1 to w/c. Pt discharged via w/c accompanied by transport service. Report given to Matthew at [facility] TCU.

## 2023-07-06 NOTE — DISCHARGE SUMMARY
[facility]  Hospitalist Discharge Summary      Date of Admission:  7/1/2023  Date of Discharge:  7/6/2023  Discharging Provider: Myra Fay MD  Discharge Service: Hospitalist Service    Discharge Diagnoses   Principal Problem:    Pneumonia of right lower lobe due to infectious organism  Active Problems:    Migraine headache    Trigeminal neuralgia    Chronic pain syndrome    Mild major depression (H)    Urinary retention    Falls frequently      Clinically Significant Risk Factors          Follow-ups Needed After Discharge   Follow-up Appointments     Follow Up and recommended labs and tests      Follow up with skilled nursing physician.  The following labs/tests are   recommended: BMP and CBC in 2-3 days  [facility] will arrange for outpatient EGD (after completing treatment for   pneumonia). [facility] will contact the patient for scheduling.            Unresulted Labs Ordered in the Past 30 Days of this Admission     No orders found from 6/1/2023 to 7/2/2023.      T    Discharge Disposition   Discharged to short-term care facility  Condition at discharge: Stable    Hospital Course   Julisa Chaney is a 78 year old female admitted on 7/1/2023.         Community acquired pneumonia  Possible Aspiration Pneumonia  Acute respiratory failure with hypoxia  Sepsis  -rocephin+zithromycin, send out on cefdinir to complete course, azithro completed   -blood cultures ngtd  -COVID negative   -swallow study reviewed.   - GI consult: outpt EGD; PPI  -diet per SLP  -HOB 30 degrees  -wean off O2 as able  -bronchial hygiene     Hypokalemia  - recheck at TCU      Seizure d/o  -stable  -keppra     History of migraine  -trileptal and topamax     Depression  -stable  -remeron and seroquel     Chronic left facial pain:  -?trigeminnal neuralgia vs other.  -CRP high in setting of PNA.  ESR 32.  -monitor for any sx's to suggest GCA which currently aren't present  -increased neurontin to 300mg BID  -continue chronic  meds    Consultations This Hospital Stay   SPEECH LANGUAGE PATH ADULT IP CONSULT  CARE MANAGEMENT / SOCIAL WORK IP CONSULT  PHYSICAL THERAPY ADULT IP CONSULT  OCCUPATIONAL THERAPY ADULT IP CONSULT  GASTROENTEROLOGY IP CONSULT  PHYSICAL THERAPY ADULT IP CONSULT  OCCUPATIONAL THERAPY ADULT IP CONSULT  SPEECH LANGUAGE PATH ADULT IP CONSULT    Code Status   Prior    Time Spent on this Encounter   I, Myra Fay MD, personally saw the patient today and spent greater than 30 minutes discharging this patient.       Myra Fay MD  [address] [address]  Phone: [phone]  Fax: [phone]  ______________________________________________________________________    Physical Exam   Vital Signs:                    Weight: 122 lbs 12.74 oz  Physical Exam  Constitutional:       Appearance: Normal appearance.   Cardiovascular:      Rate and Rhythm: Normal rate and regular rhythm.      Pulses: Normal pulses.   Pulmonary:      Effort: Pulmonary effort is normal.      Comments: Trace crackles right base   Abdominal:      General: Bowel sounds are normal.      Palpations: Abdomen is soft.   Musculoskeletal:         General: Normal range of motion.   Skin:     General: Skin is warm and dry.      Capillary Refill: Capillary refill takes less than 2 seconds.   Neurological:      General: No focal deficit present.      Mental Status: She is alert and oriented to person, place, and time. Mental status is at baseline.              Primary Care Physician   Mara Murillo    Discharge Orders      General info for SNF    Length of Stay Estimate: Short Term Care: Estimated # of Days <30  Condition at Discharge: Stable  Level of care:skilled   Rehabilitation Potential: Good  Admission H&P remains valid and up-to-date: Yes  Recent Chemotherapy: N/A  Use Nursing Home Standing Orders: Yes     Mantoux instructions    Give two-step Mantoux (PPD) Per Facility Policy Yes      Follow Up and recommended labs and tests    Follow up with prison physician.  The following labs/tests are recommended: BMP and CBC in 2-3 days  [facility] will arrange for outpatient EGD (after completing treatment for pneumonia). [facility] will contact the patient for scheduling.     Reason for your hospital stay    Principal Problem:    Pneumonia of right lower lobe due to infectious organism  Active Problems:    Migraine headache    Trigeminal neuralgia    Chronic pain syndrome    Mild major depression (H)    Urinary retention    Falls frequently     Activity - Up with assistive device     Physical Therapy Adult Consult    Evaluate and treat as clinically indicated.    Reason:  weakness     Occupational Therapy Adult Consult    Evaluate and treat as clinically indicated.    Reason:  weakness     Speech Language Path Adult Consult    Evaluate and treat as clinically indicated.    Reason:  further aspiration assessment     Contact Isolation     Fall precautions     Diet    Follow this diet upon discharge: Orders Placed This Encounter      Combination Diet Regular Diet Adult; Easy to Chew (level 7); Thin Liquids (level 0)       Significant Results and Procedures   Most Recent 3 CBC's:  Recent Labs   Lab Test 07/05/23  0555 07/04/23  1013 07/03/23  0505   WBC 6.7 8.6 14.1*   HGB 10.9* 11.6* 11.1*   MCV 89 87 90    297 216     Most Recent 3 BMP's:  Recent Labs   Lab Test 07/06/23  0450 07/05/23  0555 07/04/23  2156 07/04/23  1013 07/03/23  0505   NA  --  142  --  142 144   POTASSIUM 4.7 3.6 3.5 2.8* 3.5   CHLORIDE  --  107  --  104 107   CO2  --  26  --  28 27   BUN  --  6.1*  --  7.5* 12.3   CR  --  0.61  --  0.56 0.59   ANIONGAP  --  9  --  10 10   ADY  --  9.2  --  8.6* 9.4   GLC  --  89  --  84 92     Most Recent 2 LFT's:  Recent Labs   Lab Test 07/05/23  0555 07/03/23  0505   AST 12 20   ALT <5 8   ALKPHOS 115* 124*   BILITOTAL 0.2 0.2     Most Recent 3 INR's:  Recent Labs   Lab Test 07/29/22  1414  07/27/22  2231 07/19/22  2255   INR 1.34* 1.13 1.36*     7-Day Micro Results     Collected Updated Procedure Result Status      07/02/2023 1309 07/02/2023 1629 MRSA MSSA PCR, Nasal Swab [[id number]]    Swab from Nares, Bilateral    Final result Component Value   MRSA Target DNA Negative   SA Target DNA Positive            07/01/2023 2357 07/07/2023 1004 Blood Culture Peripheral Blood [[id number]]   Peripheral Blood    Final result Component Value   Culture No Growth               07/01/2023 2348 07/02/2023 0052 Symptomatic Influenza A/B, RSV, & SARS-CoV2 PCR (COVID-19) Nasopharyngeal [[id number]]    Swab from Nasopharyngeal    Final result Component Value   Influenza A PCR Negative   Influenza B PCR Negative   RSV PCR Negative   SARS CoV2 PCR Negative   NEGATIVE: SARS-CoV-2 (COVID-19) RNA not detected, presumed negative.            07/01/2023 2342 07/07/2023 1004 Blood Culture Peripheral Blood [[id number]]   Peripheral Blood    Final result Component Value   Culture No Growth                 ,   Results for orders placed or performed during the hospital encounter of 07/01/23   XR Chest Port 1 View    Narrative    EXAM: XR CHEST PORT 1 VIEW  LOCATION: [facility]  DATE: 7/2/2023    INDICATION: cough, weakness  COMPARISON: 07/25/2022      Impression    IMPRESSION: New airspace infiltrate present right lung base consistent with pneumonia. Left lung remains clear. Heart size normal.   XR Video Swallow with SLP or OT    Narrative    EXAM: XR VIDEO SWALLOW WITH SLP OR OT  LOCATION: [facility]  DATE: 7/3/2023    INDICATION: Difficulty swallowing. Recurrent pneumonia.  COMPARISON: 09/15/2022 and older studies    TECHNIQUE: Routine swallow study with speech pathology using multiple barium thicknesses.    FINDINGS:   FLUOROSCOPIC TIME: 5 minutes  NUMBER OF IMAGES: 13 cine loops    Swallow study with Speech Pathology using multiple barium thicknesses.     With nectar  consistency barium, there is moderate penetration and trace residual. This clears subsequently.    She was given thin barium multiple times and swallowing, always with a chin tuck, was unremarkable without any episodes of penetration or aspiration.    With pudding consistency barium, there is a tiny horizontal bar anteriorly. With multiple swallows, there is prolonged retention in the upper esophagus above the aortic arch which eventually clears after taking multiple sips of thin barium, nectar   consistency barium and warm water. Unclear if this is related to the extensive stickiness  of the barium, due to some motility issues or a combination of both.    Please see speech pathology report.               Discharge Medications   Discharge Medication List as of 7/6/2023 11:41 AM      START taking these medications    Details   cefdinir (OMNICEF) 300 MG capsule Take 1 capsule (300 mg) by mouth 2 times daily for 3 doses, Disp-3 capsule, R-0, Local Print      tamsulosin (FLOMAX) 0.4 MG capsule Take 1 capsule (0.4 mg) by mouth daily for 30 days, Disp-30 capsule, R-0, Local Print         CONTINUE these medications which have CHANGED    Details   acetaminophen-codeine (TYLENOL #3) 300-30 MG per tablet Take 1 tablet by mouth every 6 hours as needed for breakthrough pain or severe pain, Disp-12 tablet, R-0, Local Print      gabapentin (NEURONTIN) 300 MG capsule Take 1 capsule (300 mg) by mouth 2 times daily for 30 days, Disp-60 capsule, R-0, Local Print         CONTINUE these medications which have NOT CHANGED    Details   desonide (DESOWEN) 0.05 % cream [DESONIDE (DESOWEN) 0.05 % CREAM] Apply to affected area 2 times dailyDisp-60 g, R-1Normal      ferrous sulfate (FEROSUL) 325 (65 Fe) MG tablet Take 1 tablet (325 mg) by mouth daily (with breakfast), Disp-90 tablet, R-3, E-Prescribe      levETIRAcetam (KEPPRA) 750 MG tablet TAKE 1 TABLET(750 MG) BY MOUTH TWICE DAILY, Disp-180 tablet, R-1, E-Prescribe      mirtazapine  (REMERON) 15 MG tablet TAKE 1 TABLET(15 MG) BY MOUTH AT BEDTIME, Disp-30 tablet, R-2, E-Prescribe      Multiple Vitamin (MULTIVITAMIN) TABS Take 1 tablet by mouth daily, Disp-100 tablet, R-3, E-Prescribe      omeprazole (PRILOSEC) 40 MG DR capsule Take 1 capsule (40 mg) by mouth daily, Disp-90 capsule, R-3, E-Prescribe      OXcarbazepine (TRILEPTAL) 150 MG tablet TAKE 3 TABLETS(450 MG) BY MOUTH AT BEDTIME, Disp-270 tablet, R-2, E-Prescribe      polyvinyl alcohol (ARTIFICIAL TEARS) 1.4 % ophthalmic solution Place 1 drop into both eyes every hour as needed for dry eyes, Disp-60 mL, R-0, E-Prescribe      QUEtiapine (SEROQUEL) 50 MG tablet Take 1 tablet (50 mg) by mouth At Bedtime, Disp-90 tablet, R-1, E-Prescribe      senna-docusate (STIMULANT LAXATIVE) 8.6-50 MG tablet TAKE 1 TABLET BY MOUTH AT BEDTIME, Disp-90 tablet, R-3, E-Prescribe      topiramate (TOPAMAX) 50 MG tablet TAKE 1 TABLET(50 MG) BY MOUTH AT BEDTIME, Disp-90 tablet, R-3, E-Prescribe      Vitamin D3 50 mcg (2000 units) tablet Take 1 tablet (50 mcg) by mouth daily, Disp-90 tablet, R-4, E-Prescribe      meclizine (ANTIVERT) 12.5 MG tablet Take 1 tablet (12.5 mg) by mouth 2 times daily, Disp-30 tablet, R-0, E-Prescribe         STOP taking these medications       nortriptyline (PAMELOR) 50 MG capsule Comments:   Reason for Stopping:             Allergies   Allergies   Allergen Reactions     Contrast [Iohexol] Rash     Noticed during 08/2020 admission. Received IV contrast on 8/5     Chocolate Headache     Contrast Dye      Penicillins Rash

## 2023-07-06 NOTE — PROGRESS NOTES
Speech Language Therapy Discharge Summary    Reason for therapy discharge:    Discharged to transitional care facility.    Progress towards therapy goal(s). See goals on Care Plan in [facility] electronic health record for goal details.  Goals partially met.  Barriers to achieving goals:   discharge from facility.    Therapy recommendation(s):    Continued therapy is recommended.  Rationale/Recommendations:  Easy to Chew and Thin liquids with strict use of chin tuck and alternating solids and liquids. VFSS on 7/3/23 showed penetration with thin and mildly thick that does not clear and signifcant upper eosphageal stasis with puree.. Recommend initiation of pharyngeal exercise program at next level of care to maintain/improve function.

## 2023-07-07 ENCOUNTER — PATIENT OUTREACH (OUTPATIENT)
Dept: CARE COORDINATION | Facility: CLINIC | Age: 78
End: 2023-07-07

## 2023-07-07 ENCOUNTER — TRANSITIONAL CARE UNIT VISIT (OUTPATIENT)
Dept: GERIATRICS | Facility: CLINIC | Age: 78
End: 2023-07-07
Payer: COMMERCIAL

## 2023-07-07 VITALS
WEIGHT: 122 LBS | BODY MASS INDEX: 21.62 KG/M2 | DIASTOLIC BLOOD PRESSURE: 58 MMHG | SYSTOLIC BLOOD PRESSURE: 130 MMHG | TEMPERATURE: 97.4 F | HEIGHT: 63 IN | OXYGEN SATURATION: 94 % | HEART RATE: 77 BPM | RESPIRATION RATE: 17 BRPM

## 2023-07-07 DIAGNOSIS — G50.0 TRIGEMINAL NEURALGIA: ICD-10-CM

## 2023-07-07 DIAGNOSIS — J18.9 PNEUMONIA OF RIGHT LOWER LOBE DUE TO INFECTIOUS ORGANISM: ICD-10-CM

## 2023-07-07 DIAGNOSIS — R53.81 PHYSICAL DECONDITIONING: Primary | ICD-10-CM

## 2023-07-07 DIAGNOSIS — G89.4 CHRONIC PAIN SYNDROME: ICD-10-CM

## 2023-07-07 DIAGNOSIS — K22.4 ESOPHAGEAL DYSMOTILITY: ICD-10-CM

## 2023-07-07 LAB
BACTERIA BLD CULT: NO GROWTH
BACTERIA BLD CULT: NO GROWTH

## 2023-07-07 PROCEDURE — 99309 SBSQ NF CARE MODERATE MDM 30: CPT | Performed by: NURSE PRACTITIONER

## 2023-07-07 NOTE — LETTER
7/7/2023        RE: Julisa Chaney  [address]  [place] [address]        [facility] GERIATR[facility]    PRIMARY CARE PROVIDER AND CLINIC:  Mara Murillo MD, [address] [facility] / [place] [address]  Chief Complaint   Patient presents with     Hospital F/U      [place] [facility] Record Number:  [id number]  Place of Service where encounter took place:  [facility] ([facility]) [[id number]]    Julisa Chaney  is a 78 year old  (1945), admitted to the above facility from  [facility]. Hospital stay 7/1/23 through 7/6/23. Patient with PMH trigeminal neuralgia, chronic pain, migraines, seizure disorder, depression, and frequent falls presented with acute weakness and cough. She was admitted with RLL pneumonia, ?community acquired vs aspiration. Upon SLP evaluation, she reported issues with swallowing for years. She would try to avoid trigger foods. Swallow study showed significant upper esophageal stasis and possible dysmotility. [facility] recommends EGD when recovered from pneumonia.     HPI obtained from patient visit, review of nursing home record, discussion with facility staff, and Epic review.     HPI:    Patient reports some ongoing cough, but is generally improving. She has little appetite, but this is not new. She is feeling very weak still. No dizziness    CODE STATUS/ADVANCE DIRECTIVES DISCUSSION:  CPR/Full code   ALLERGIES:   Allergies   Allergen Reactions     Contrast [Iohexol] Rash     Noticed during 08/2020 admission. Received IV contrast on 8/5     Chocolate Headache     Contrast Dye      Penicillins Rash      PAST MEDICAL HISTORY:   Past Medical History:   Diagnosis Date     Aspiration pneumonia (H) 02/2022     Depression      High cholesterol      Migraines      Pyloric ulcer, chronic      Sepsis (H) 08/10/2020     Trigeminal neuralgia       PAST SURGICAL HISTORY:   has a past surgical history that includes Colonoscopy w/wo Brush **Performed** (N/A, 8/13/2020); Pr  Esophagogastroduodenoscopy Transoral Diagnostic (N/A, 8/13/2020); Hysterectomy; Pr Esophagogastroduodenoscopy Transoral Diagnostic (N/A, 8/14/2020); PICC/Midline Placement (7/22/2022); Ventriculostomy (Left, 7/31/2022); and IR Gastrostomy Tube Percutaneous Plcmnt (8/16/2022).  FAMILY HISTORY: family history includes Breast Cancer in her maternal aunt, mother, sister, and sister; Cancer in her father; Testicular cancer (age of onset: 17.00) in her grandchild.  SOCIAL HISTORY:   reports that she quit smoking about 8 years ago. She has a 50.00 pack-year smoking history. She has never used smokeless tobacco. She reports that she does not drink alcohol and does not use drugs.  Patient's living condition: lives alone    Post Discharge Medication Reconciliation Status:   MED REC REQUIRED  Post Medication Reconciliation Status:  Discharge medications reconciled, continue medications without change         Current Outpatient Medications   Medication Sig     acetaminophen-codeine (TYLENOL #3) 300-30 MG per tablet Take 1 tablet by mouth every 6 hours as needed for breakthrough pain or severe pain     desonide (DESOWEN) 0.05 % cream [DESONIDE (DESOWEN) 0.05 % CREAM] Apply to affected area 2 times daily (Patient taking differently: Apply topically 2 times daily as needed (to sores as needed))     ferrous sulfate (FEROSUL) 325 (65 Fe) MG tablet Take 1 tablet (325 mg) by mouth daily (with breakfast)     gabapentin (NEURONTIN) 300 MG capsule Take 1 capsule (300 mg) by mouth 2 times daily for 30 days     levETIRAcetam (KEPPRA) 750 MG tablet TAKE 1 TABLET(750 MG) BY MOUTH TWICE DAILY     meclizine (ANTIVERT) 12.5 MG tablet Take 1 tablet (12.5 mg) by mouth 2 times daily     mirtazapine (REMERON) 15 MG tablet TAKE 1 TABLET(15 MG) BY MOUTH AT BEDTIME     Multiple Vitamin (MULTIVITAMIN) TABS Take 1 tablet by mouth daily     omeprazole (PRILOSEC) 40 MG DR capsule Take 1 capsule (40 mg) by mouth daily     OXcarbazepine (TRILEPTAL) 150 MG  "tablet TAKE 3 TABLETS(450 MG) BY MOUTH AT BEDTIME     polyvinyl alcohol (ARTIFICIAL TEARS) 1.4 % ophthalmic solution Place 1 drop into both eyes every hour as needed for dry eyes     QUEtiapine (SEROQUEL) 50 MG tablet Take 1 tablet (50 mg) by mouth At Bedtime     senna-docusate (STIMULANT LAXATIVE) 8.6-50 MG tablet TAKE 1 TABLET BY MOUTH AT BEDTIME     tamsulosin (FLOMAX) 0.4 MG capsule Take 1 capsule (0.4 mg) by mouth daily for 30 days     topiramate (TOPAMAX) 50 MG tablet TAKE 1 TABLET(50 MG) BY MOUTH AT BEDTIME     Vitamin D3 50 mcg (2000 units) tablet Take 1 tablet (50 mcg) by mouth daily     No current facility-administered medications for this visit.       ROS:  4 point ROS including Respiratory, CV, GI and , other than that noted in the HPI,  is negative    Vitals:  /58   Pulse 77   Temp 97.4  F (36.3  C)   Resp 17   Ht 1.6 m (5' 3\")   Wt 55.3 kg (122 lb)   SpO2 94%   BMI 21.61 kg/m    Exam:  GENERAL APPEARANCE:  Alert, in no distress, frail appearing  ENT:  Mouth and posterior oropharynx normal, moist mucous membranes  EYES:  EOM normal, conjunctiva and lids normal  RESP:  lungs clear to auscultation , no respiratory distress  CV:  no edema  PSYCH:  oriented X 3, affect and mood normal    Lab/Diagnostic data:  Recent labs in [facility] reviewed by me today.     ASSESSMENT/PLAN:  (R53.81) Physical deconditioning  (primary encounter diagnosis)  Comment: Due to acute illness and possibly some chronic failure to thrive  Plan: PT/OT eval and treat, discharge planning per their recommendations.    (J18.9) Pneumonia of right lower lobe due to infectious organism  Comment: Improving  Plan: Continue current POC with no changes at this time and adjustments as needed.    (K22.4) Esophageal dysmotility  Comment: Diet per SLP and dietician. F/u [facility] after TCU    (G50.0) Trigeminal neuralgia  (G89.4) Chronic pain syndrome  Comment: Chronic condition being managed with medications.  Plan: Continue current POC with " no changes at this time and adjustments as needed.      Electronically signed by:  DANIELLE Villanueva CNP                     Sincerely,        DANIELLE Villanueva CNP

## 2023-07-07 NOTE — PROGRESS NOTES
[facility] GERIATRICS    PRIMARY CARE PROVIDER AND CLINIC:  Mara Murillo MD, [address] [facility] / [facility] [address]  Chief Complaint   Patient presents with     Hospital F/U      [place] Medical Record Number:  [id number]  Place of Service where encounter took place:  [facility] (U) [[id number]]    Julisa Chaney  is a 78 year old  (1945), admitted to the above facility from  [facility]. Hospital stay 7/1/23 through 7/6/23. Patient with PMH trigeminal neuralgia, chronic pain, migraines, seizure disorder, depression, and frequent falls presented with acute weakness and cough. She was admitted with RLL pneumonia, ?community acquired vs aspiration. Upon SLP evaluation, she reported issues with swallowing for years. She would try to avoid trigger foods. Swallow study showed significant upper esophageal stasis and possible dysmotility. [facility] recommends EGD when recovered from pneumonia.     HPI obtained from patient visit, review of nursing home record, discussion with facility staff, and Epic review.     HPI:    Patient reports some ongoing cough, but is generally improving. She has little appetite, but this is not new. She is feeling very weak still. No dizziness    CODE STATUS/ADVANCE DIRECTIVES DISCUSSION:  CPR/Full code   ALLERGIES:   Allergies   Allergen Reactions     Contrast [Iohexol] Rash     Noticed during 08/2020 admission. Received IV contrast on 8/5     Chocolate Headache     Contrast Dye      Penicillins Rash      PAST MEDICAL HISTORY:   Past Medical History:   Diagnosis Date     Aspiration pneumonia (H) 02/2022     Depression      High cholesterol      Migraines      Pyloric ulcer, chronic      Sepsis (H) 08/10/2020     Trigeminal neuralgia       PAST SURGICAL HISTORY:   has a past surgical history that includes Colonoscopy w/wo Brush **Performed** (N/A, 8/13/2020); Pr Esophagogastroduodenoscopy Transoral Diagnostic (N/A, 8/13/2020); Hysterectomy; Pr  Esophagogastroduodenoscopy Transoral Diagnostic (N/A, 8/14/2020); PICC/Midline Placement (7/22/2022); Ventriculostomy (Left, 7/31/2022); and IR Gastrostomy Tube Percutaneous Plcmnt (8/16/2022).  FAMILY HISTORY: family history includes Breast Cancer in her maternal aunt, mother, sister, and sister; Cancer in her father; Testicular cancer (age of onset: 17.00) in her grandchild.  SOCIAL HISTORY:   reports that she quit smoking about 8 years ago. She has a 50.00 pack-year smoking history. She has never used smokeless tobacco. She reports that she does not drink alcohol and does not use drugs.  Patient's living condition: lives alone    Post Discharge Medication Reconciliation Status:   MED REC REQUIRED  Post Medication Reconciliation Status:  Discharge medications reconciled, continue medications without change         Current Outpatient Medications   Medication Sig     acetaminophen-codeine (TYLENOL #3) 300-30 MG per tablet Take 1 tablet by mouth every 6 hours as needed for breakthrough pain or severe pain     desonide (DESOWEN) 0.05 % cream [DESONIDE (DESOWEN) 0.05 % CREAM] Apply to affected area 2 times daily (Patient taking differently: Apply topically 2 times daily as needed (to sores as needed))     ferrous sulfate (FEROSUL) 325 (65 Fe) MG tablet Take 1 tablet (325 mg) by mouth daily (with breakfast)     gabapentin (NEURONTIN) 300 MG capsule Take 1 capsule (300 mg) by mouth 2 times daily for 30 days     levETIRAcetam (KEPPRA) 750 MG tablet TAKE 1 TABLET(750 MG) BY MOUTH TWICE DAILY     meclizine (ANTIVERT) 12.5 MG tablet Take 1 tablet (12.5 mg) by mouth 2 times daily     mirtazapine (REMERON) 15 MG tablet TAKE 1 TABLET(15 MG) BY MOUTH AT BEDTIME     Multiple Vitamin (MULTIVITAMIN) TABS Take 1 tablet by mouth daily     omeprazole (PRILOSEC) 40 MG DR capsule Take 1 capsule (40 mg) by mouth daily     OXcarbazepine (TRILEPTAL) 150 MG tablet TAKE 3 TABLETS(450 MG) BY MOUTH AT BEDTIME     polyvinyl alcohol (ARTIFICIAL  "TEARS) 1.4 % ophthalmic solution Place 1 drop into both eyes every hour as needed for dry eyes     QUEtiapine (SEROQUEL) 50 MG tablet Take 1 tablet (50 mg) by mouth At Bedtime     senna-docusate (STIMULANT LAXATIVE) 8.6-50 MG tablet TAKE 1 TABLET BY MOUTH AT BEDTIME     tamsulosin (FLOMAX) 0.4 MG capsule Take 1 capsule (0.4 mg) by mouth daily for 30 days     topiramate (TOPAMAX) 50 MG tablet TAKE 1 TABLET(50 MG) BY MOUTH AT BEDTIME     Vitamin D3 50 mcg (2000 units) tablet Take 1 tablet (50 mcg) by mouth daily     No current facility-administered medications for this visit.       ROS:  4 point ROS including Respiratory, CV, GI and , other than that noted in the HPI,  is negative    Vitals:  /58   Pulse 77   Temp 97.4  F (36.3  C)   Resp 17   Ht 1.6 m (5' 3\")   Wt 55.3 kg (122 lb)   SpO2 94%   BMI 21.61 kg/m    Exam:  GENERAL APPEARANCE:  Alert, in no distress, frail appearing  ENT:  Mouth and posterior oropharynx normal, moist mucous membranes  EYES:  EOM normal, conjunctiva and lids normal  RESP:  lungs clear to auscultation , no respiratory distress  CV:  no edema  PSYCH:  oriented X 3, affect and mood normal    Lab/Diagnostic data:  Recent labs in [facility] reviewed by me today.     ASSESSMENT/PLAN:  (R53.81) Physical deconditioning  (primary encounter diagnosis)  Comment: Due to acute illness and possibly some chronic failure to thrive  Plan: PT/OT eval and treat, discharge planning per their recommendations.    (J18.9) Pneumonia of right lower lobe due to infectious organism  Comment: Improving  Plan: Continue current POC with no changes at this time and adjustments as needed.    (K22.4) Esophageal dysmotility  Comment: Diet per SLP and dietician. F/u [facility] after TCU    (G50.0) Trigeminal neuralgia  (G89.4) Chronic pain syndrome  Comment: Chronic condition being managed with medications.  Plan: Continue current POC with no changes at this time and adjustments as needed.      Electronically signed " by:  DANIELLE Villanueva CNP

## 2023-07-07 NOTE — CONSULTS
Consult Date: 07/27/2022    IMPRESSION:  A 77-year-old female with COVID-19, severe sepsis secondary to pneumonia, and possibe brain abscess.    PLAN:  From the neurosurgical standpoint, we do not plan for any immediate operative neurosurgical intervention.  Recommend workup with MRI with contrast of the brain, Infectious Disease consult, Neurology for possible LP, and Neurosurgery will follow along.        TYPE OF VISIT:  The neurosurgical service was consulted see Mrs. Chaney for intracranial abscess.    HISTORY OF PRESENT ILLNESS:  Ms. Chaney is a 77-year-old female who was transferred from [facility] for continued workup of infection and possible brain abscess.    HISTORY OF PRESENT ILLNESS:  Ms. Chaney is a 77-year-old female who presented to the Emergency Room on 07/19/2022 at [facility] for nausea and vomiting.  Ultimately, workup revealed COVID-19, pneumonia, severe sepsis and ongoing encephalopathy.  She had a brain MRI today, which showed a possibe cerebral abscess, and she was transferred to [facility].  The patient is unable to give any history.    PAST MEDICAL HISTORY:  As stated above, including COVID-19, pneumonia, severe sepsis secondary to pneumonia.    PAST SURGICAL HISTORY:  Hysterectomy.    SOCIAL HISTORY:  Apparently quit smoking 7 years ago with a 50-pack-year history.    MEDICATIONS:  Reviewed per the electronic medical record.    ALLERGIES:  REVIEWED.    PHYSICAL EXAMINATION:    VITAL SIGNS:  Current temperature is 98.4, was 101.9 on 07/22/2022.  Blood pressure is 134/65.  Pulse is 104.  Respiratory rate is 25.  GENERAL:  Upon entering the room, she has her eyes closed.   NEUROLOGIC:  She opens up her eyes to verbal stimuli.  She does not follow commands consistently but will squeeze hands and wiggle toes.  She is able to lift both arms up in the air.  Pupils are equal, round, and reactive.  Mauro coma scale 11.    IMAGING STUDIES:  She had a brain MRI performed earlier  today, showing possible abscess in the left frontal area with diffusion restriction appreciated per Radiology.  Alternatively, could be a neoplastic process.  Also, there are focal areas of diffusion restriction within the occipital horns concerning for ventriculitis.  Mild asymmetric enlargement of the left lateral ventricle with material at the foramen of Monro consistent with mild hydrocephalus.    LABORATORY DATA:  INR is 1.13.  Sodium is 134.  White count 6.8, platelets 547.  CRP is 5.7.    TOTAL TIME SPENT WITH THE PATIENT:  70 minutes, including 50 minutes of counseling and coordination of care.    Discussed with Dr. Alatorre    Dictated by SHEKHAR GONZALEZ        D: 2022   T: 2022   MT: LONDON[place]    Name:     CHELITA SAWANTLucy  MRN:      [id number]        Account:      [id number]   :      1945           Consult Date: 2022     Document: [id number]   none

## 2023-07-08 ENCOUNTER — LAB REQUISITION (OUTPATIENT)
Dept: LAB | Facility: CLINIC | Age: 78
End: 2023-07-08
Payer: COMMERCIAL

## 2023-07-08 DIAGNOSIS — J18.9 PNEUMONIA, UNSPECIFIED ORGANISM: ICD-10-CM

## 2023-07-09 LAB
ATRIAL RATE - MUSE: 85 BPM
DIASTOLIC BLOOD PRESSURE - MUSE: NORMAL MMHG
INTERPRETATION ECG - MUSE: NORMAL
P AXIS - MUSE: 51 DEGREES
PR INTERVAL - MUSE: 164 MS
QRS DURATION - MUSE: 80 MS
QT - MUSE: 398 MS
QTC - MUSE: 473 MS
R AXIS - MUSE: 11 DEGREES
SYSTOLIC BLOOD PRESSURE - MUSE: NORMAL MMHG
T AXIS - MUSE: 54 DEGREES
VENTRICULAR RATE- MUSE: 85 BPM

## 2023-07-10 DIAGNOSIS — G43.009 MIGRAINE WITHOUT AURA AND WITHOUT STATUS MIGRAINOSUS, NOT INTRACTABLE: ICD-10-CM

## 2023-07-10 DIAGNOSIS — G89.4 CHRONIC PAIN SYNDROME: ICD-10-CM

## 2023-07-10 DIAGNOSIS — G50.0 TRIGEMINAL NEURALGIA: ICD-10-CM

## 2023-07-10 DIAGNOSIS — G50.0 TRIGEMINAL NEURALGIA: Primary | ICD-10-CM

## 2023-07-10 LAB
ANION GAP SERPL CALCULATED.3IONS-SCNC: 13 MMOL/L (ref 7–15)
BUN SERPL-MCNC: 13.7 MG/DL (ref 8–23)
CALCIUM SERPL-MCNC: 9.2 MG/DL (ref 8.8–10.2)
CHLORIDE SERPL-SCNC: 105 MMOL/L (ref 98–107)
CREAT SERPL-MCNC: 0.56 MG/DL (ref 0.51–0.95)
DEPRECATED HCO3 PLAS-SCNC: 24 MMOL/L (ref 22–29)
ERYTHROCYTE [DISTWIDTH] IN BLOOD BY AUTOMATED COUNT: 14.6 % (ref 10–15)
GFR SERPL CREATININE-BSD FRML MDRD: >90 ML/MIN/1.73M2
GLUCOSE SERPL-MCNC: 62 MG/DL (ref 70–99)
HCT VFR BLD AUTO: 44.6 % (ref 35–47)
HGB BLD-MCNC: 13.2 G/DL (ref 11.7–15.7)
MCH RBC QN AUTO: 26.9 PG (ref 26.5–33)
MCHC RBC AUTO-ENTMCNC: 29.6 G/DL (ref 31.5–36.5)
MCV RBC AUTO: 91 FL (ref 78–100)
PLATELET # BLD AUTO: 457 10E3/UL (ref 150–450)
POTASSIUM SERPL-SCNC: 4.6 MMOL/L (ref 3.4–5.3)
RBC # BLD AUTO: 4.9 10E6/UL (ref 3.8–5.2)
SODIUM SERPL-SCNC: 142 MMOL/L (ref 136–145)
WBC # BLD AUTO: 7 10E3/UL (ref 4–11)

## 2023-07-10 PROCEDURE — P9603 ONE-WAY ALLOW PRORATED MILES: HCPCS

## 2023-07-10 PROCEDURE — 80048 BASIC METABOLIC PNL TOTAL CA: CPT

## 2023-07-10 PROCEDURE — 36415 COLL VENOUS BLD VENIPUNCTURE: CPT

## 2023-07-10 PROCEDURE — 85027 COMPLETE CBC AUTOMATED: CPT

## 2023-07-10 RX ORDER — ACETAMINOPHEN AND CODEINE PHOSPHATE 300; 30 MG/1; MG/1
1 TABLET ORAL EVERY 6 HOURS PRN
Qty: 60 TABLET | Refills: 0 | Status: SHIPPED | OUTPATIENT
Start: 2023-07-10 | End: 2023-07-21

## 2023-07-10 RX ORDER — ACETAMINOPHEN AND CODEINE PHOSPHATE 300; 30 MG/1; MG/1
1 TABLET ORAL EVERY 6 HOURS PRN
Qty: 60 TABLET | Refills: 0 | Status: SHIPPED | OUTPATIENT
Start: 2023-07-10 | End: 2023-07-10

## 2023-07-12 ENCOUNTER — TRANSITIONAL CARE UNIT VISIT (OUTPATIENT)
Dept: GERIATRICS | Facility: CLINIC | Age: 78
End: 2023-07-12
Payer: COMMERCIAL

## 2023-07-12 VITALS
BODY MASS INDEX: 23.32 KG/M2 | HEIGHT: 63 IN | RESPIRATION RATE: 18 BRPM | TEMPERATURE: 97.3 F | SYSTOLIC BLOOD PRESSURE: 122 MMHG | OXYGEN SATURATION: 95 % | DIASTOLIC BLOOD PRESSURE: 76 MMHG | WEIGHT: 131.6 LBS | HEART RATE: 77 BPM

## 2023-07-12 DIAGNOSIS — G89.4 CHRONIC PAIN SYNDROME: ICD-10-CM

## 2023-07-12 DIAGNOSIS — G50.0 TRIGEMINAL NEURALGIA: ICD-10-CM

## 2023-07-12 DIAGNOSIS — J69.0 ASPIRATION PNEUMONIA OF RIGHT LOWER LOBE, UNSPECIFIED ASPIRATION PNEUMONIA TYPE (H): ICD-10-CM

## 2023-07-12 DIAGNOSIS — K22.4 ESOPHAGEAL DYSMOTILITY: ICD-10-CM

## 2023-07-12 DIAGNOSIS — R53.81 PHYSICAL DECONDITIONING: Primary | ICD-10-CM

## 2023-07-12 PROCEDURE — 99309 SBSQ NF CARE MODERATE MDM 30: CPT | Performed by: NURSE PRACTITIONER

## 2023-07-12 NOTE — PROGRESS NOTES
"[facility] GERIATRICS    Chief Complaint   Patient presents with     RECHECK     HPI:  Julisa Chaney is a 78 year old  (1945), who is being seen today for an episodic care visit at: [facility] (U) [[id number]]. [facility] stay 7/1/23 through 7/6/23. Patient with PMH trigeminal neuralgia, chronic pain, migraines, seizure disorder, depression, and frequent falls presented with acute weakness and cough. She was admitted with RLL pneumonia, ?community acquired vs aspiration. Upon SLP evaluation, she reported issues with swallowing for years. She would try to avoid trigger foods. Swallow study showed significant upper esophageal stasis and possible dysmotility. [facility] recommends EGD when recovered from pneumonia.     Today's concern is:   Patient's goal is to discharge home. Per therapy she is walking 70 feet with a walker. SLUMS 21/30. She talks at length about her esophageal issues. She first noted this in her 40s. She is typically very careful with what she eats. She has never had to call the paramedics. If something gets stuck, she usually waits it out. She verbalizes that she would call 911 if she were having breathing difficulty or severe pain. She is aware of the recommendation for an EGD. She will follow up with GI after TCU discharge. She denies any cough or SOB currently.     Allergies, and PMH/PSH reviewed in [facility] today.  REVIEW OF SYSTEMS:  4 point ROS including Respiratory, CV, GI and , other than that noted in the HPI,  is negative    Objective:   /76   Pulse 77   Temp 97.3  F (36.3  C)   Resp 18   Ht 1.6 m (5' 3\")   Wt 59.7 kg (131 lb 9.6 oz)   SpO2 95%   BMI 23.31 kg/m    GENERAL APPEARANCE:  Alert, in no distress, thin  ENT:  Mouth and posterior oropharynx normal, moist mucous membranes  EYES:  EOM normal, conjunctiva and lids normal  RESP:  no respiratory distress  PSYCH:  oriented X 3, affect and mood normal    Recent labs in [facility] reviewed " by me today.     Assessment/Plan:  (R53.81) Physical deconditioning  (primary encounter diagnosis)  Comment: Due to acute illness.   Plan: PT/OT eval and treat, discharge planning per their recommendations.    (J69.0) Aspiration pneumonia of right lower lobe, unspecified aspiration pneumonia type (H)  Comment: Asymptomatic  Plan: Continue current POC with no changes at this time and adjustments as needed.    (K22.4) Esophageal dysmotility  Comment: Chronic  Plan: f/u [facility]    (G50.0) Trigeminal neuralgia  (G89.4) Chronic pain syndrome  Comment: Chronic condition being managed with medications.  Plan: Continue current POC with no changes at this time and adjustments as needed.        Electronically signed by: DANIELLE Villanueva CNP

## 2023-07-12 NOTE — LETTER
"    7/12/2023        RE: Julisa Chaney  [address]  [place] [address]        [facility] GERIATRICS    Chief Complaint   Patient presents with     RECHECK     HPI:  Julisa Chaney is a 78 year old  (1945), who is being seen today for an episodic care visit at: [facility] (U) [[id number]]. [facility] stay 7/1/23 through 7/6/23. Patient with PMH trigeminal neuralgia, chronic pain, migraines, seizure disorder, depression, and frequent falls presented with acute weakness and cough. She was admitted with RLL pneumonia, ?community acquired vs aspiration. Upon SLP evaluation, she reported issues with swallowing for years. She would try to avoid trigger foods. Swallow study showed significant upper esophageal stasis and possible dysmotility. [facility] recommends EGD when recovered from pneumonia.     Today's concern is:   Patient's goal is to discharge home. Per therapy she is walking 70 feet with a walker. SLUMS 21/30. She talks at length about her esophageal issues. She first noted this in her 40s. She is typically very careful with what she eats. She has never had to call the paramedics. If something gets stuck, she usually waits it out. She verbalizes that she would call 911 if she were having breathing difficulty or severe pain. She is aware of the recommendation for an EGD. She will follow up with GI after TCU discharge. She denies any cough or SOB currently.     Allergies, and PMH/PSH reviewed in [facility] today.  REVIEW OF SYSTEMS:  4 point ROS including Respiratory, CV, GI and , other than that noted in the HPI,  is negative    Objective:   /76   Pulse 77   Temp 97.3  F (36.3  C)   Resp 18   Ht 1.6 m (5' 3\")   Wt 59.7 kg (131 lb 9.6 oz)   SpO2 95%   BMI 23.31 kg/m    GENERAL APPEARANCE:  Alert, in no distress, thin  ENT:  Mouth and posterior oropharynx normal, moist mucous membranes  EYES:  EOM normal, conjunctiva and lids normal  RESP:  no " respiratory distress  PSYCH:  oriented X 3, affect and mood normal    Recent labs in [facility] reviewed by me today.     Assessment/Plan:  (R53.81) Physical deconditioning  (primary encounter diagnosis)  Comment: Due to acute illness.   Plan: PT/OT eval and treat, discharge planning per their recommendations.    (J69.0) Aspiration pneumonia of right lower lobe, unspecified aspiration pneumonia type (H)  Comment: Asymptomatic  Plan: Continue current POC with no changes at this time and adjustments as needed.    (K22.4) Esophageal dysmotility  Comment: Chronic  Plan: f/u [facility]    (G50.0) Trigeminal neuralgia  (G89.4) Chronic pain syndrome  Comment: Chronic condition being managed with medications.  Plan: Continue current POC with no changes at this time and adjustments as needed.        Electronically signed by: DANIELLE Villanueva CNP             Sincerely,        DANIELLE Villanueva CNP

## 2023-07-18 ENCOUNTER — TRANSITIONAL CARE UNIT VISIT (OUTPATIENT)
Dept: GERIATRICS | Facility: CLINIC | Age: 78
End: 2023-07-18
Payer: COMMERCIAL

## 2023-07-18 VITALS
SYSTOLIC BLOOD PRESSURE: 115 MMHG | HEART RATE: 68 BPM | DIASTOLIC BLOOD PRESSURE: 62 MMHG | WEIGHT: 107.4 LBS | OXYGEN SATURATION: 93 % | RESPIRATION RATE: 18 BRPM | HEIGHT: 63 IN | BODY MASS INDEX: 19.03 KG/M2 | TEMPERATURE: 96.9 F

## 2023-07-18 DIAGNOSIS — G40.909 SEIZURE DISORDER (H): ICD-10-CM

## 2023-07-18 DIAGNOSIS — G50.0 TRIGEMINAL NEURALGIA: ICD-10-CM

## 2023-07-18 DIAGNOSIS — F33.0 MILD RECURRENT MAJOR DEPRESSION (H): ICD-10-CM

## 2023-07-18 DIAGNOSIS — R53.81 PHYSICAL DECONDITIONING: ICD-10-CM

## 2023-07-18 DIAGNOSIS — K22.4 ESOPHAGEAL DYSMOTILITY: ICD-10-CM

## 2023-07-18 DIAGNOSIS — J69.0 ASPIRATION PNEUMONIA OF RIGHT LOWER LOBE, UNSPECIFIED ASPIRATION PNEUMONIA TYPE (H): Primary | ICD-10-CM

## 2023-07-18 DIAGNOSIS — G89.4 CHRONIC PAIN SYNDROME: ICD-10-CM

## 2023-07-18 DIAGNOSIS — K21.9 GASTROESOPHAGEAL REFLUX DISEASE WITHOUT ESOPHAGITIS: ICD-10-CM

## 2023-07-18 DIAGNOSIS — R33.9 URINE RETENTION: ICD-10-CM

## 2023-07-18 DIAGNOSIS — D50.9 IRON DEFICIENCY ANEMIA, UNSPECIFIED IRON DEFICIENCY ANEMIA TYPE: ICD-10-CM

## 2023-07-18 PROCEDURE — 99305 1ST NF CARE MODERATE MDM 35: CPT | Performed by: INTERNAL MEDICINE

## 2023-07-18 NOTE — LETTER
7/18/2023        RE: Julisa Chaney  [address]  [place] [address]        [facility] GERIATRICS  INITIAL VISIT NOTE  July 18, 2023    PRIMARY CARE PROVIDER AND CLINIC:  Mara Murillo [address] / [place] [address]    [facility] Medical Record Number:  [id number]  Place of Service where encounter took place:  [facility] (U) [[id number]]    Chief Complaint   Patient presents with     Hospital F/U       HPI:    Julisa Chaney is a 78 year old  (1945[facility] Medical history is notable for trigeminal neuralgia, chronic pain, seizure disorder. She was hospitalized at [facility] from 7/1/23 to 7/6/23 where she presented with generalized weakness and a cough and was admitted with a RLL PNA and sepsis. She was discharged to complete a course of antibiotics.  SLP eval with concern for upper esophageal stasis and possible dysmotility.  GI was consulted and recommended an outpatient EGD once pneumonia has resolved and continuing a PPI. She was also started on tamsulosin - not really any details in the chart about this - I think it was because she was straight cath'd for 600 ml in the ER. She admitted to this facility for  rehab, medical management and nursing care.      History obtained from: facility chart records, facility staff, patient report and [facility] chart review.      Today, Ms Chaney is seen in her room sitting up in bed reading a novel.  She is an excellent historian. She told me about the 3 falls she had at home prior to her hospital stay, with the last one where she was unable to get up.  She is able to tell me about her hospital stay and is aware of the purpose of the TCU.  Her goal remains restorative and to get back home.  No chest pain or dyspnea.  No belly pain.  No diarrhea or constipation.  She is sleeping well.  She likes the food and is eating well.  No concerns today per discussion with nursing.  She is working  with therapies.    CODE STATUS: CPR/Full code     ALLERGIES:  Allergies   Allergen Reactions     Contrast [Iohexol] Rash     Noticed during 08/2020 admission. Received IV contrast on 8/5     Chocolate Headache     Contrast Dye      Penicillins Rash       PAST MEDICAL HISTORY:   Past Medical History:   Diagnosis Date     Aspiration pneumonia (H) 02/2022     Depression      High cholesterol      Migraines      Pyloric ulcer, chronic      Sepsis (H) 08/10/2020     Trigeminal neuralgia        PAST SURGICAL HISTORY:   Past Surgical History:   Procedure Laterality Date     HYSTERECTOMY       IR GASTROSTOMY TUBE PERCUTANEOUS PLCMNT  8/16/2022     PICC TRIPLE LUMEN PLACEMENT  7/22/2022          NC ESOPHAGOGASTRODUODENOSCOPY TRANSORAL DIAGNOSTIC N/A 8/13/2020    Procedure: ESOPHAGOGASTRODUODENOSCOPY (EGD);  Surgeon: Nathan Smalls MD;  Location: [facility];  Service: Gastroenterology     NC ESOPHAGOGASTRODUODENOSCOPY TRANSORAL DIAGNOSTIC N/A 8/14/2020    Procedure: ESOPHAGOGASTRODUODENOSCOPY (EGD), PYLORIC DILATION;  Surgeon: Nathan Smalls MD;  Location: [facility];  Service: Gastroenterology     VENTRICULOSTOMY Left 7/31/2022    Procedure: LEFT FRONTAL VENTRICULOSTOMY;  Surgeon: Brady Heredia MD;  Location: [facility] COLONOSCOPY W/WO BRUSH/WASH N/A 8/13/2020    Procedure: COLONOSCOPY WITH POLYPECTOMY;  Surgeon: Nathan Smalls MD;  Location: [facility];  Service: Gastroenterology       FAMILY HISTORY:   Family History   Problem Relation Age of Onset     Cancer Father      Testicular cancer Grandchild 17.00     Breast Cancer Mother      Breast Cancer Sister      Breast Cancer Maternal Aunt      Breast Cancer Sister      SOCIAL HISTORY:   Patient's living condition: lives alone    MEDICATIONS:  Post Discharge Medication Reconciliation Status: discharge medications reconciled and changed, per note/orders.  Current Outpatient Medications   Medication Sig Dispense Refill      "acetaminophen-codeine (TYLENOL #3) 300-30 MG per tablet Take 1 tablet by mouth every 6 hours as needed for severe pain 60 tablet 0     desonide (DESOWEN) 0.05 % cream [DESONIDE (DESOWEN) 0.05 % CREAM] Apply to affected area 2 times daily (Patient taking differently: Apply topically 2 times daily as needed (to sores as needed)) 60 g 1     ferrous sulfate (FEROSUL) 325 (65 Fe) MG tablet Take 1 tablet (325 mg) by mouth daily (with breakfast) 90 tablet 3     gabapentin (NEURONTIN) 300 MG capsule Take 1 capsule (300 mg) by mouth 2 times daily for 30 days 60 capsule 0     levETIRAcetam (KEPPRA) 750 MG tablet TAKE 1 TABLET(750 MG) BY MOUTH TWICE DAILY 180 tablet 1     meclizine (ANTIVERT) 12.5 MG tablet Take 1 tablet (12.5 mg) by mouth 2 times daily 30 tablet 0     mirtazapine (REMERON) 15 MG tablet TAKE 1 TABLET(15 MG) BY MOUTH AT BEDTIME 30 tablet 2     Multiple Vitamin (MULTIVITAMIN) TABS Take 1 tablet by mouth daily 100 tablet 3     omeprazole (PRILOSEC) 40 MG DR capsule Take 1 capsule (40 mg) by mouth daily 90 capsule 3     OXcarbazepine (TRILEPTAL) 150 MG tablet TAKE 3 TABLETS(450 MG) BY MOUTH AT BEDTIME 270 tablet 2     polyvinyl alcohol (ARTIFICIAL TEARS) 1.4 % ophthalmic solution Place 1 drop into both eyes every hour as needed for dry eyes 60 mL 0     QUEtiapine (SEROQUEL) 50 MG tablet Take 1 tablet (50 mg) by mouth At Bedtime 90 tablet 1     senna-docusate (STIMULANT LAXATIVE) 8.6-50 MG tablet TAKE 1 TABLET BY MOUTH AT BEDTIME 90 tablet 3     tamsulosin (FLOMAX) 0.4 MG capsule Take 1 capsule (0.4 mg) by mouth daily for 30 days 30 capsule 0     topiramate (TOPAMAX) 50 MG tablet TAKE 1 TABLET(50 MG) BY MOUTH AT BEDTIME 90 tablet 3     Vitamin D3 50 mcg (2000 units) tablet Take 1 tablet (50 mcg) by mouth daily 90 tablet 4       ROS:  6 point ROS neg other than the symptoms noted above in the HPI.      PHYSICAL EXAM:  /62   Pulse 68   Temp 96.9  F (36.1  C)   Resp 18   Ht 1.6 m (5' 3\")   Wt 48.7 kg (107 " lb 6.4 oz)   SpO2 93%   BMI 19.03 kg/m    Gen: sitting up in bed, alert, cooperative and in no acute distress   Card: RRR, S1, S2, no murmurs  Resp: lungs clear to auscultation bilaterally, no crackles or wheezes anteriorly or latearlly   Ext: no LE edema  Neuro: CX II-XII grossly in tact; ROM in all four extremities grossly in tact  Psych: alert and oriented x3; normal affect     LABORATORY/IMAGING DATA:  Reviewed as per [facility] and/or [facility]    ASSESSMENT/PLAN:    RLL PNA with Sepsis, Resolved  Afebrile. No hypoxia. Lungs clear today. Completed antibiotics  -- follow clinically    Esophageal Dysmotility  GI consulted after SLP eval  -- needs outpatient EGD - will defer to PCP    Urinary Retention   ER nursing note with straight cath for 600 ml and appears was started on tamsulosin at that time. Not really otherwise in chart  -- tamsulosin 0.4 mg daily  -- not sure this is needed? Will need to watch BPs, etc given her history of falls    Seizure Disorder  -- levetiracetam 750 mg BID     Trigeminal Neuralgia  Chronic Pain Syndrome  -- gabapentin 300 mg BID, oxcarbazepine 450 mg at bedtime and topiramate 50 mg at bedtime     Mild Major Recurrent Depression  Mood and spirits were good today  -- mirtazapine 15 mg at bedtime, quetiapine 50 mg at bedtime     Fe Deficiency Anemia  Baseline Hgb 11 range. Hgb 13.2 on 7/10.   -- FeSO4 325 mg daily     GERD  -- omeprazole 20 mg daily     Physical Deconditioning  In setting of hospitalization and underlying medical conditions  -- ongoing PT/OT      Electronically signed by:  Clementine Berumen MD                          Sincerely,        Clementine Berumen MD

## 2023-07-19 DIAGNOSIS — F41.9 ANXIETY: ICD-10-CM

## 2023-07-20 RX ORDER — QUETIAPINE FUMARATE 50 MG/1
TABLET, FILM COATED ORAL
Qty: 90 TABLET | Refills: 1 | Status: SHIPPED | OUTPATIENT
Start: 2023-07-20 | End: 2023-07-21

## 2023-07-20 NOTE — TELEPHONE ENCOUNTER
Routing refill request to provider for review/approval because:  Labs not current:  lipids    Last Written Prescription Date:  12/21/22  Last Fill Quantity: 90,  # refills: 1   Last office visit provider:  6/01/23 w/ Dr Murillo     Requested Prescriptions   Pending Prescriptions Disp Refills     QUEtiapine (SEROQUEL) 50 MG tablet [Pharmacy Med Name: QUETIAPINE 50MG  TABLETS] 90 tablet 1     Sig: TAKE 1 TABLET(50 MG) BY MOUTH AT BEDTIME       Antipsychotic Medications Failed - 7/19/2023 12:33 PM        Failed - Lipid panel on file within the past 12 months     Recent Labs   Lab Test 05/19/22  1430   CHOL 247*   TRIG 105   HDL 77   *               Passed - Blood pressure under 140/90 in past 12 months     BP Readings from Last 3 Encounters:   07/18/23 115/62   07/12/23 122/76   07/07/23 130/58                 Passed - Heart Rate on file within past 12 months     Pulse Readings from Last 3 Encounters:   07/18/23 68   07/12/23 77   07/07/23 77               Passed - A1c or Glucose on file in past 12 months     Recent Labs   Lab Test 07/10/23  1140 07/20/22  0923 07/19/22  1901   GLC 62*   < > 252*   A1C  --   --  5.6    < > = values in this interval not displayed.       Please review patients last 3 weights. If a weight gain of >10 lbs exists, you may refill the prescription once after instructing the patient to schedule an appointment within the next 30 days.    Wt Readings from Last 3 Encounters:   07/18/23 48.7 kg (107 lb 6.4 oz)   07/12/23 59.7 kg (131 lb 9.6 oz)   07/07/23 55.3 kg (122 lb)             Passed - Medication is active on med list        Passed - Patient is 18 years of age or older        Passed - Patient is not pregnant        Passed - No positve pregnancy test on file in past 12 months        Passed - Recent (6 mo) or future (30 days) visit within the authorizing provider's specialty     Patient had office visit in the last 6 months or has a visit in the next 30 days with authorizing provider or  "within the authorizing provider's specialty.  See \"Patient Info\" tab in inbasket, or \"Choose Columns\" in Meds & Orders section of the refill encounter.                 Rajwinder Frances RN 07/20/23 6:51 AM  "

## 2023-07-20 NOTE — PROGRESS NOTES
[facility] GERIATRICS DISCHARGE SUMMARY  PATIENT'S NAME: Julisa Chaney  YOB: 1945  MEDICAL RECORD NUMBER:  [id number]  Place of Service where encounter took place:  [facility] (U) [[id number]]    PRIMARY CARE PROVIDER AND CLINIC RESPONSIBLE AFTER TRANSFER:   Mara Murillo MD, [address] [facility] [address]    Non-G Provider     Transferring providers: DANIELLE Purcell CNP, Clementine Berumen MD  Recent Hospitalization/ED:  Hospital  [facility] stay 7/1/23 to 7/6/23.  Date of SNF Admission: 7/6/23  Date of SNF (anticipated) Discharge: 7/22/23  Discharged to: previous independent home  Cognitive Scores: BIMS: 14/15  Physical Function: Ambulating 100 ft with walker  DME: SNF  coordinating DME needs     CODE STATUS/ADVANCE DIRECTIVES DISCUSSION:  Full Code   ALLERGIES: Contrast [iohexol], Chocolate, Contrast dye, and Penicillins    NURSING FACILITY COURSE   Medication Changes/Rationale:     Julisa Chaney  is a 78 year old  (1945), admitted to the above facility from  [facility]. Hospital stay 7/1/23 through 7/6/23. Patient with PMH trigeminal neuralgia, chronic pain, migraines, seizure disorder, depression, and frequent falls presented with acute weakness and cough. She was admitted with RLL pneumonia, ?community acquired vs aspiration. Upon SLP evaluation, she reported issues with swallowing for years. She would try to avoid trigger foods. Swallow study showed significant upper esophageal stasis and possible dysmotility. [facility] recommends EGD when recovered from pneumonia.     Summary of nursing facility stay:     (R53.81) Physical deconditioning  (primary encounter diagnosis)  Comment: Due to acute illness and possibly some chronic failure to thrive. Noted on 7/19:  [employer] set LCD 7/21. Starting 7/22 pp begins. Resident has the choice to appeal by calling [employer] [phone] by noon 7/20 before LCD 7/21.  Plan:    -Continue PT/OT eval and treat  -SW to remain involved for safe discharge planning needs    (R33.9) Urinary Retention   Comment: ER nursing note with straight cath for 600 ml and appears was started on tamsulosin at that time. Not really otherwise in chart  Plan:  -Continue tamsulosin 0.4 mg daily. not sure this is needed?   -Follow up with PCP post TCU     (G40.909) Seizure Disorder  Comment: Chronic. Stable. Unknown last seizure  Plan:  -Monitor seizure activity  -Follow up with neurology as directed  -Continue levetiracetam 750 mg BID     (F33.0) Mild Major Recurrent Depression  Comment: Chronic. Mood and spirits were good today  Plan:  -Continue mirtazapine 15 mg at bedtime  -Continue quetiapine 50 mg at bedtime      (D50.9) Fe Deficiency Anemia  Comment: Baseline Hgb 11 range. Hgb 13.2 on 7/10.   Plan:  -Monitor bleeding risks  -Continue FeSO4 325 mg daily      (K21.9) GERD  Comment: Stable  Plan:  -Continue omeprazole 20 mg daily      (J18.9) Pneumonia of right lower lobe due to infectious organism  Comment: Improving  Plan:   -Monitor respiratory status  -Continue current POC with no changes at this time and adjustments as needed.     (K22.4) Esophageal dysmotility  Comment: Diet per SLP and dietician. F/u [facility] after TCU     (G50.0) Trigeminal neuralgia  (G89.4) Chronic pain syndrome  Comment: Chronic condition being managed with medications.  Plan:   -Monitor pain complaints  -Follow up with PCP post TCU  -Reduced gabapentin down to 100mg in Am and 300mg at HS due to daytime drowsiness.   -Continue Tylenol with codeine PRN    Discharge Medications:  MED REC REQUIRED  Post Medication Reconciliation Status:  Medication reconciliation previously completed during another office visit         Current Outpatient Medications   Medication Sig Dispense Refill     acetaminophen-codeine (TYLENOL #3) 300-30 MG per tablet Take 1 tablet by mouth every 6 hours as needed for severe pain 12 tablet 0     desonide (DESOWEN)  0.05 % external cream Apply to affected area 2 times daily 60 g 0     ferrous sulfate (FEROSUL) 325 (65 Fe) MG tablet Take 1 tablet (325 mg) by mouth daily (with breakfast) 30 tablet 0     gabapentin (NEURONTIN) 100 MG capsule Take 1 capsule (100 mg) by mouth daily (with breakfast) AND 3 capsules (300 mg) At Bedtime. 120 capsule 0     levETIRAcetam (KEPPRA) 750 MG tablet Take 1 tablet (750 mg) by mouth 2 times daily 60 tablet 0     meclizine (ANTIVERT) 12.5 MG tablet Take 1 tablet (12.5 mg) by mouth 2 times daily 60 tablet 0     mirtazapine (REMERON) 15 MG tablet Take 1 tablet (15 mg) by mouth At Bedtime 30 tablet 0     OXcarbazepine (TRILEPTAL) 150 MG tablet Take 3 tablets (450 mg) by mouth daily 90 tablet 0     QUEtiapine (SEROQUEL) 50 MG tablet Take 1 tablet (50 mg) by mouth At Bedtime 30 tablet 0     tamsulosin (FLOMAX) 0.4 MG capsule Take 1 capsule (0.4 mg) by mouth daily 30 capsule 0     topiramate (TOPAMAX) 50 MG tablet Take 1 tablet (50 mg) by mouth At Bedtime 30 tablet 0     Multiple Vitamin (MULTIVITAMIN) TABS Take 1 tablet by mouth daily 100 tablet 3     omeprazole (PRILOSEC) 40 MG DR capsule Take 1 capsule (40 mg) by mouth daily 90 capsule 3     polyvinyl alcohol (ARTIFICIAL TEARS) 1.4 % ophthalmic solution Place 1 drop into both eyes every hour as needed for dry eyes 60 mL 0     senna-docusate (STIMULANT LAXATIVE) 8.6-50 MG tablet TAKE 1 TABLET BY MOUTH AT BEDTIME 90 tablet 3     Vitamin D3 50 mcg (2000 units) tablet Take 1 tablet (50 mcg) by mouth daily 90 tablet 4      Controlled medications:   Medication Tylenol wikt Codeine electronically prescribed to 10# tablets to [facility]     Past Medical History:   Past Medical History:   Diagnosis Date     Aspiration pneumonia (H) 02/2022     Depression      High cholesterol      Migraines      Pyloric ulcer, chronic      Sepsis (H) 08/10/2020     Trigeminal neuralgia      Physical Exam:   Vitals: /78   Pulse 77   Temp 97.4  F (36.3  C)    "Resp 18   Ht 1.6 m (5' 3\")   Wt 49.1 kg (108 lb 4.8 oz)   SpO2 96%   BMI 19.18 kg/m    BMI: Body mass index is 19.18 kg/m .  GENERAL APPEARANCE:  Alert, in no distress, oriented, cooperative  ENT:  Mouth and posterior oropharynx normal, moist mucous membranes, normal hearing acuity  EYES:  EOM, conjunctivae, lids, pupils and irises normal  NECK:  No adenopathy,masses or thyromegaly  RESP:  respiratory effort and palpation of chest normal, lungs clear to auscultation , no respiratory distress  CV:  Palpation and auscultation of heart done , regular rate and rhythm, no murmur, rub, or gallop, no edema, +2 pedal pulses  ABDOMEN:  normal bowel sounds, soft, nontender, no hepatosplenomegaly or other masses, no guarding or rebound  M/S:   Ambulates short distance with walker. Wheelchair for long distance needs  SKIN:  Inspection of skin and subcutaneous tissue baseline, Palpation of skin and subcutaneous tissue baseline  NEURO:   Cranial nerves 2-12 are normal tested and grossly at patient's baseline, no purposeful movement in upper and lower extremities  PSYCH:  oriented X 3, normal insight, judgement and memory, affect and mood normal     SNF labs:   Most Recent 3 CBC's:  Recent Labs   Lab Test 07/10/23  1140 07/05/23  0555 07/04/23  1013   WBC 7.0 6.7 8.6   HGB 13.2 10.9* 11.6*   MCV 91 89 87   * 256 297     Most Recent 3 BMP's:  Recent Labs   Lab Test 07/10/23  1140 07/06/23  0450 07/05/23  0555 07/04/23  2156 07/04/23  1013     --  142  --  142   POTASSIUM 4.6 4.7 3.6   < > 2.8*   CHLORIDE 105  --  107  --  104   CO2 24  --  26  --  28   BUN 13.7  --  6.1*  --  7.5*   CR 0.56  --  0.61  --  0.56   ANIONGAP 13  --  9  --  10   ADY 9.2  --  9.2  --  8.6*   GLC 62*  --  89  --  84    < > = values in this interval not displayed.     Most Recent 2 LFT's:  Recent Labs   Lab Test 07/05/23  0555 07/03/23  0505   AST 12 20   ALT <5 8   ALKPHOS 115* 124*   BILITOTAL 0.2 0.2     Most Recent Anemia " Panel:  Recent Labs   Lab Test 07/10/23  1140 10/10/22  0712 09/29/22  1626 08/01/22  0006 07/31/22  0622 07/30/22  0559   WBC 7.0   < >  --    < > 8.2 8.2   HGB 13.2   < >  --    < > 7.4* 8.5*   HCT 44.6   < >  --    < > 25.7* 29.4*   MCV 91   < >  --    < > 72* 71*   *   < >  --    < > 523* 602*   IRON  --   --   --   --   --  14*   IRONSAT  --   --   --   --   --  6*   RETICABSCT  --   --   --   --  0.066  --    RETP  --   --   --   --  1.8  --    FEB  --   --   --   --   --  218*   PAOLA  --   --   --   --   --  35   B12  --   --  1,069  --   --  2,228*   FOLIC  --   --  12.1  --  10.1  --     < > = values in this interval not displayed.       DISCHARGE PLAN:    Follow up labs: No labs orders/due    Medical Follow Up:      Follow up with primary care provider in 1-2 weeks  Follow up with specialist ROLDAN     Current [place] scheduled appointments:    Discharge Services: Home Care:  Occupational Therapy, Physical Therapy and From:  [facility]    Discharge Instructions Verbalized to Patient at Discharge:     Weight bearing restrictions:  Weight bearing as tolerated.     Continue to follow your diet:  regular.     Magic cup and ensure daily    24-hour supervision is recommended for safety.     TOTAL DISCHARGE TIME:   Greater than 30 minutes  Electronically signed by:  Maria Elena Lama DNP, APRN    Home care Face to Face documentation done in [employer] attached to Home care orders for [facility].

## 2023-07-21 ENCOUNTER — DISCHARGE SUMMARY NURSING HOME (OUTPATIENT)
Dept: GERIATRICS | Facility: CLINIC | Age: 78
End: 2023-07-21
Payer: COMMERCIAL

## 2023-07-21 VITALS
TEMPERATURE: 97.4 F | DIASTOLIC BLOOD PRESSURE: 78 MMHG | HEART RATE: 77 BPM | RESPIRATION RATE: 18 BRPM | SYSTOLIC BLOOD PRESSURE: 124 MMHG | HEIGHT: 63 IN | WEIGHT: 108.3 LBS | BODY MASS INDEX: 19.19 KG/M2 | OXYGEN SATURATION: 96 %

## 2023-07-21 DIAGNOSIS — F33.0 MILD RECURRENT MAJOR DEPRESSION (H): ICD-10-CM

## 2023-07-21 DIAGNOSIS — L20.9 ATOPIC DERMATITIS, MILD: ICD-10-CM

## 2023-07-21 DIAGNOSIS — F41.9 ANXIETY: ICD-10-CM

## 2023-07-21 DIAGNOSIS — R42 DIZZINESS: ICD-10-CM

## 2023-07-21 DIAGNOSIS — G43.719 INTRACTABLE CHRONIC MIGRAINE WITHOUT AURA AND WITHOUT STATUS MIGRAINOSUS: ICD-10-CM

## 2023-07-21 DIAGNOSIS — G47.00 PERSISTENT INSOMNIA: ICD-10-CM

## 2023-07-21 DIAGNOSIS — K22.4 ESOPHAGEAL DYSMOTILITY: ICD-10-CM

## 2023-07-21 DIAGNOSIS — G40.909 SEIZURE DISORDER (H): ICD-10-CM

## 2023-07-21 DIAGNOSIS — G89.4 CHRONIC PAIN SYNDROME: ICD-10-CM

## 2023-07-21 DIAGNOSIS — R33.9 URINE RETENTION: ICD-10-CM

## 2023-07-21 DIAGNOSIS — R53.81 PHYSICAL DECONDITIONING: ICD-10-CM

## 2023-07-21 DIAGNOSIS — G50.0 TRIGEMINAL NEURALGIA: ICD-10-CM

## 2023-07-21 DIAGNOSIS — F32.0 CURRENT MILD EPISODE OF MAJOR DEPRESSIVE DISORDER WITHOUT PRIOR EPISODE (H): ICD-10-CM

## 2023-07-21 DIAGNOSIS — D50.0 IRON DEFICIENCY ANEMIA DUE TO CHRONIC BLOOD LOSS: ICD-10-CM

## 2023-07-21 DIAGNOSIS — G89.4 CHRONIC PAIN SYNDROME: Primary | ICD-10-CM

## 2023-07-21 DIAGNOSIS — G51.8 FACIAL NEURALGIA: ICD-10-CM

## 2023-07-21 DIAGNOSIS — D50.9 IRON DEFICIENCY ANEMIA, UNSPECIFIED IRON DEFICIENCY ANEMIA TYPE: ICD-10-CM

## 2023-07-21 DIAGNOSIS — J69.0 ASPIRATION PNEUMONIA OF RIGHT LOWER LOBE, UNSPECIFIED ASPIRATION PNEUMONIA TYPE (H): ICD-10-CM

## 2023-07-21 DIAGNOSIS — K21.9 GASTROESOPHAGEAL REFLUX DISEASE WITHOUT ESOPHAGITIS: ICD-10-CM

## 2023-07-21 DIAGNOSIS — R33.9 URINARY RETENTION: ICD-10-CM

## 2023-07-21 PROCEDURE — 99316 NF DSCHRG MGMT 30 MIN+: CPT | Performed by: NURSE PRACTITIONER

## 2023-07-21 RX ORDER — DESONIDE 0.5 MG/G
CREAM TOPICAL
Qty: 60 G | Refills: 0 | Status: SHIPPED | OUTPATIENT
Start: 2023-07-21 | End: 2024-01-17

## 2023-07-21 RX ORDER — MIRTAZAPINE 15 MG/1
15 TABLET, FILM COATED ORAL AT BEDTIME
Qty: 30 TABLET | Refills: 0 | Status: SHIPPED | OUTPATIENT
Start: 2023-07-21 | End: 2023-09-11

## 2023-07-21 RX ORDER — LEVETIRACETAM 750 MG/1
750 TABLET ORAL 2 TIMES DAILY
Qty: 60 TABLET | Refills: 0 | Status: SHIPPED | OUTPATIENT
Start: 2023-07-21 | End: 2023-09-24

## 2023-07-21 RX ORDER — FERROUS SULFATE 325(65) MG
325 TABLET ORAL
Qty: 30 TABLET | Refills: 0 | Status: SHIPPED | OUTPATIENT
Start: 2023-07-21 | End: 2023-09-24

## 2023-07-21 RX ORDER — QUETIAPINE FUMARATE 50 MG/1
50 TABLET, FILM COATED ORAL AT BEDTIME
Qty: 30 TABLET | Refills: 0 | Status: SHIPPED | OUTPATIENT
Start: 2023-07-21 | End: 2023-09-24

## 2023-07-21 RX ORDER — TOPIRAMATE 50 MG/1
50 TABLET, FILM COATED ORAL AT BEDTIME
Qty: 30 TABLET | Refills: 0 | Status: SHIPPED | OUTPATIENT
Start: 2023-07-21 | End: 2023-09-12

## 2023-07-21 RX ORDER — MECLIZINE HCL 12.5 MG 12.5 MG/1
12.5 TABLET ORAL 2 TIMES DAILY
Qty: 60 TABLET | Refills: 0 | Status: SHIPPED | OUTPATIENT
Start: 2023-07-21 | End: 2023-09-11

## 2023-07-21 RX ORDER — OXCARBAZEPINE 150 MG/1
450 TABLET, FILM COATED ORAL DAILY
Qty: 90 TABLET | Refills: 0 | Status: SHIPPED | OUTPATIENT
Start: 2023-07-21 | End: 2023-09-24

## 2023-07-21 RX ORDER — TAMSULOSIN HYDROCHLORIDE 0.4 MG/1
0.4 CAPSULE ORAL DAILY
Qty: 30 CAPSULE | Refills: 0 | Status: SHIPPED | OUTPATIENT
Start: 2023-07-21 | End: 2023-10-22

## 2023-07-21 RX ORDER — GABAPENTIN 100 MG/1
CAPSULE ORAL
Qty: 120 CAPSULE | Refills: 0 | Status: SHIPPED | OUTPATIENT
Start: 2023-07-21 | End: 2024-01-17

## 2023-07-21 RX ORDER — ACETAMINOPHEN AND CODEINE PHOSPHATE 300; 30 MG/1; MG/1
1 TABLET ORAL EVERY 6 HOURS PRN
Qty: 12 TABLET | Refills: 0 | Status: SHIPPED | OUTPATIENT
Start: 2023-07-21 | End: 2023-08-03

## 2023-07-21 NOTE — LETTER
7/21/2023        RE: Julisa Chaney  [address]  [place] [address]        [facility] GERIATRICS DISCHARGE SUMMARY  PATIENT'S NAME: Julisa Chaney  YOB: 1945  MEDICAL RECORD NUMBER:  [id number]  Place of Service where encounter took place:  [facility] (U) [[id number]]    PRIMARY CARE PROVIDER AND CLINIC RESPONSIBLE AFTER TRANSFER:   Mara Murillo MD, [address] [facility] [address]    Non-FMG Provider     Transferring providers: DANIELLE Purcell CNP, Clementine Berumen MD  Recent Hospitalization/ED:  Hospital  [facility] stay 7/1/23 to 7/6/23.  Date of SNF Admission: 7/6/23  Date of SNF (anticipated) Discharge: 7/22/23  Discharged to: previous independent home  Cognitive Scores: BIMS: 14/15  Physical Function: Ambulating 100 ft with walker  DME: SNF  coordinating DME needs     CODE STATUS/ADVANCE DIRECTIVES DISCUSSION:  Full Code   ALLERGIES: Contrast [iohexol], Chocolate, Contrast dye, and Penicillins    NURSING FACILITY COURSE   Medication Changes/Rationale:     Julisa Chaney  is a 78 year old  (1945), admitted to the above facility from  [facility]. Hospital stay 7/1/23 through 7/6/23. Patient with PMH trigeminal neuralgia, chronic pain, migraines, seizure disorder, depression, and frequent falls presented with acute weakness and cough. She was admitted with RLL pneumonia, ?community acquired vs aspiration. Upon SLP evaluation, she reported issues with swallowing for years. She would try to avoid trigger foods. Swallow study showed significant upper esophageal stasis and possible dysmotility. [facility] recommends EGD when recovered from pneumonia.     Summary of nursing facility stay:     (R53.81) Physical deconditioning  (primary encounter diagnosis)  Comment: Due to acute illness and possibly some chronic failure to thrive. Noted on 7/19:  [employer] set LCD 7/21. Starting 7/22 pp begins. Resident has  the choice to appeal by calling [employer] [phone] by noon 7/20 before LCD 7/21.  Plan:   -Continue PT/OT eval and treat  -SW to remain involved for safe discharge planning needs    (R33.9) Urinary Retention   Comment: ER nursing note with straight cath for 600 ml and appears was started on tamsulosin at that time. Not really otherwise in chart  Plan:  -Continue tamsulosin 0.4 mg daily. not sure this is needed?   -Follow up with PCP post TCU     (G40.909) Seizure Disorder  Comment: Chronic. Stable. Unknown last seizure  Plan:  -Monitor seizure activity  -Follow up with neurology as directed  -Continue levetiracetam 750 mg BID     (F33.0) Mild Major Recurrent Depression  Comment: Chronic. Mood and spirits were good today  Plan:  -Continue mirtazapine 15 mg at bedtime  -Continue quetiapine 50 mg at bedtime      (D50.9) Fe Deficiency Anemia  Comment: Baseline Hgb 11 range. Hgb 13.2 on 7/10.   Plan:  -Monitor bleeding risks  -Continue FeSO4 325 mg daily      (K21.9) GERD  Comment: Stable  Plan:  -Continue omeprazole 20 mg daily      (J18.9) Pneumonia of right lower lobe due to infectious organism  Comment: Improving  Plan:   -Monitor respiratory status  -Continue current POC with no changes at this time and adjustments as needed.     (K22.4) Esophageal dysmotility  Comment: Diet per SLP and dietician. F/u [facility] after TCU     (G50.0) Trigeminal neuralgia  (G89.4) Chronic pain syndrome  Comment: Chronic condition being managed with medications.  Plan:   -Monitor pain complaints  -Follow up with PCP post TCU  -Reduced gabapentin down to 100mg in Am and 300mg at HS due to daytime drowsiness.   -Continue Tylenol with codeine PRN    Discharge Medications:  MED REC REQUIRED  Post Medication Reconciliation Status:  Medication reconciliation previously completed during another office visit         Current Outpatient Medications   Medication Sig Dispense Refill     acetaminophen-codeine (TYLENOL #3) 300-30 MG per tablet Take 1  tablet by mouth every 6 hours as needed for severe pain 12 tablet 0     desonide (DESOWEN) 0.05 % external cream Apply to affected area 2 times daily 60 g 0     ferrous sulfate (FEROSUL) 325 (65 Fe) MG tablet Take 1 tablet (325 mg) by mouth daily (with breakfast) 30 tablet 0     gabapentin (NEURONTIN) 100 MG capsule Take 1 capsule (100 mg) by mouth daily (with breakfast) AND 3 capsules (300 mg) At Bedtime. 120 capsule 0     levETIRAcetam (KEPPRA) 750 MG tablet Take 1 tablet (750 mg) by mouth 2 times daily 60 tablet 0     meclizine (ANTIVERT) 12.5 MG tablet Take 1 tablet (12.5 mg) by mouth 2 times daily 60 tablet 0     mirtazapine (REMERON) 15 MG tablet Take 1 tablet (15 mg) by mouth At Bedtime 30 tablet 0     OXcarbazepine (TRILEPTAL) 150 MG tablet Take 3 tablets (450 mg) by mouth daily 90 tablet 0     QUEtiapine (SEROQUEL) 50 MG tablet Take 1 tablet (50 mg) by mouth At Bedtime 30 tablet 0     tamsulosin (FLOMAX) 0.4 MG capsule Take 1 capsule (0.4 mg) by mouth daily 30 capsule 0     topiramate (TOPAMAX) 50 MG tablet Take 1 tablet (50 mg) by mouth At Bedtime 30 tablet 0     Multiple Vitamin (MULTIVITAMIN) TABS Take 1 tablet by mouth daily 100 tablet 3     omeprazole (PRILOSEC) 40 MG DR capsule Take 1 capsule (40 mg) by mouth daily 90 capsule 3     polyvinyl alcohol (ARTIFICIAL TEARS) 1.4 % ophthalmic solution Place 1 drop into both eyes every hour as needed for dry eyes 60 mL 0     senna-docusate (STIMULANT LAXATIVE) 8.6-50 MG tablet TAKE 1 TABLET BY MOUTH AT BEDTIME 90 tablet 3     Vitamin D3 50 mcg (2000 units) tablet Take 1 tablet (50 mcg) by mouth daily 90 tablet 4      Controlled medications:   Medication Tylenol wikth Codeine electronically prescribed to 10# tablets to [facility] pharmacy     Past Medical History:   Past Medical History:   Diagnosis Date     Aspiration pneumonia (H) 02/2022     Depression      High cholesterol      Migraines      Pyloric ulcer, chronic      Sepsis (H) 08/10/2020     Trigeminal  "neuralgia      Physical Exam:   Vitals: /78   Pulse 77   Temp 97.4  F (36.3  C)   Resp 18   Ht 1.6 m (5' 3\")   Wt 49.1 kg (108 lb 4.8 oz)   SpO2 96%   BMI 19.18 kg/m    BMI: Body mass index is 19.18 kg/m .  GENERAL APPEARANCE:  Alert, in no distress, oriented, cooperative  ENT:  Mouth and posterior oropharynx normal, moist mucous membranes, normal hearing acuity  EYES:  EOM, conjunctivae, lids, pupils and irises normal  NECK:  No adenopathy,masses or thyromegaly  RESP:  respiratory effort and palpation of chest normal, lungs clear to auscultation , no respiratory distress  CV:  Palpation and auscultation of heart done , regular rate and rhythm, no murmur, rub, or gallop, no edema, +2 pedal pulses  ABDOMEN:  normal bowel sounds, soft, nontender, no hepatosplenomegaly or other masses, no guarding or rebound  M/S:   Ambulates short distance with walker. Wheelchair for long distance needs  SKIN:  Inspection of skin and subcutaneous tissue baseline, Palpation of skin and subcutaneous tissue baseline  NEURO:   Cranial nerves 2-12 are normal tested and grossly at patient's baseline, no purposeful movement in upper and lower extremities  PSYCH:  oriented X 3, normal insight, judgement and memory, affect and mood normal     SNF labs:   Most Recent 3 CBC's:  Recent Labs   Lab Test 07/10/23  1140 07/05/23  0555 07/04/23  1013   WBC 7.0 6.7 8.6   HGB 13.2 10.9* 11.6*   MCV 91 89 87   * 256 297     Most Recent 3 BMP's:  Recent Labs   Lab Test 07/10/23  1140 07/06/23  0450 07/05/23  0555 07/04/23  2156 07/04/23  1013     --  142  --  142   POTASSIUM 4.6 4.7 3.6   < > 2.8*   CHLORIDE 105  --  107  --  104   CO2 24  --  26  --  28   BUN 13.7  --  6.1*  --  7.5*   CR 0.56  --  0.61  --  0.56   ANIONGAP 13  --  9  --  10   ADY 9.2  --  9.2  --  8.6*   GLC 62*  --  89  --  84    < > = values in this interval not displayed.     Most Recent 2 LFT's:  Recent Labs   Lab Test 07/05/23  0555 07/03/23  0505   AST 12 " 20   ALT <5 8   ALKPHOS 115* 124*   BILITOTAL 0.2 0.2     Most Recent Anemia Panel:  Recent Labs   Lab Test 07/10/23  1140 10/10/22  0712 09/29/22  1626 08/01/22  0006 07/31/22  0622 07/30/22  0559   WBC 7.0   < >  --    < > 8.2 8.2   HGB 13.2   < >  --    < > 7.4* 8.5*   HCT 44.6   < >  --    < > 25.7* 29.4*   MCV 91   < >  --    < > 72* 71*   *   < >  --    < > 523* 602*   IRON  --   --   --   --   --  14*   IRONSAT  --   --   --   --   --  6*   RETICABSCT  --   --   --   --  0.066  --    RETP  --   --   --   --  1.8  --    FEB  --   --   --   --   --  218*   PAOLA  --   --   --   --   --  35   B12  --   --  1,069  --   --  2,228*   FOLIC  --   --  12.1  --  10.1  --     < > = values in this interval not displayed.       DISCHARGE PLAN:    Follow up labs: No labs orders/due    Medical Follow Up:      Follow up with primary care provider in 1-2 weeks  Follow up with specialist ROLDAN     Current [place] scheduled appointments:    Discharge Services: Home Care:  Occupational Therapy, Physical Therapy and From:  [facility]    Discharge Instructions Verbalized to Patient at Discharge:     Weight bearing restrictions:  Weight bearing as tolerated.     Continue to follow your diet:  regular.     Magic cup and ensure daily    24-hour supervision is recommended for safety.     TOTAL DISCHARGE TIME:   Greater than 30 minutes  Electronically signed by:  Maria Elena Lama DNP, DANIELLE    Home care Face to Face documentation done in [facility] attached to Home care orders for [facility].                   Sincerely,        DANIELEL Purcell CNP

## 2023-07-27 ENCOUNTER — MEDICAL CORRESPONDENCE (OUTPATIENT)
Dept: HEALTH INFORMATION MANAGEMENT | Facility: CLINIC | Age: 78
End: 2023-07-27

## 2023-07-27 ENCOUNTER — TELEPHONE (OUTPATIENT)
Dept: FAMILY MEDICINE | Facility: CLINIC | Age: 78
End: 2023-07-27
Payer: COMMERCIAL

## 2023-07-27 NOTE — TELEPHONE ENCOUNTER
Home Care is calling regarding an established patient with [facility].       Requesting orders from: Mara Murillo  Provider is following patient: Yes  Is this a 60-day recertification request?  Yes    Orders Requested    Physical Therapy  Request for recertification   Frequency:  PT Kelin  is requesting PT 1 x a week for 4 weeks and then 1 visit every other week for 2 visits     Confirmed ok to leave a detailed message with call back.  Contact information confirmed and updated as needed.    Kelin FINCH [facility] [phone]    Alena Gallardo RN

## 2023-07-31 NOTE — TELEPHONE ENCOUNTER
LISA for Kelin PT that all home care orders for physical therapy are approved per Dr. Murillo.  Contact information for clinic left in message in case there is anything else needed for Julisa's care.      ANGELINA ChoiN  ABRIL    [facility]  07/31/23

## 2023-08-03 ENCOUNTER — MYC REFILL (OUTPATIENT)
Dept: FAMILY MEDICINE | Facility: CLINIC | Age: 78
End: 2023-08-03
Payer: COMMERCIAL

## 2023-08-03 DIAGNOSIS — G50.0 TRIGEMINAL NEURALGIA: ICD-10-CM

## 2023-08-03 DIAGNOSIS — G89.4 CHRONIC PAIN SYNDROME: ICD-10-CM

## 2023-08-03 RX ORDER — ACETAMINOPHEN AND CODEINE PHOSPHATE 300; 30 MG/1; MG/1
1 TABLET ORAL EVERY 6 HOURS PRN
Qty: 12 TABLET | Refills: 0 | Status: SHIPPED | OUTPATIENT
Start: 2023-08-03 | End: 2023-08-07

## 2023-08-05 ENCOUNTER — HEALTH MAINTENANCE LETTER (OUTPATIENT)
Age: 78
End: 2023-08-05

## 2023-08-09 DIAGNOSIS — Z53.9 DIAGNOSIS NOT YET DEFINED: Primary | ICD-10-CM

## 2023-08-09 PROCEDURE — G0180 MD CERTIFICATION HHA PATIENT: HCPCS | Performed by: FAMILY MEDICINE

## 2023-08-22 DIAGNOSIS — Z53.9 DIAGNOSIS NOT YET DEFINED: Primary | ICD-10-CM

## 2023-08-22 PROCEDURE — G0180 MD CERTIFICATION HHA PATIENT: HCPCS | Performed by: FAMILY MEDICINE

## 2023-08-23 ENCOUNTER — MYC MEDICAL ADVICE (OUTPATIENT)
Dept: FAMILY MEDICINE | Facility: CLINIC | Age: 78
End: 2023-08-23
Payer: COMMERCIAL

## 2023-08-23 DIAGNOSIS — G50.0 TRIGEMINAL NEURALGIA: Primary | ICD-10-CM

## 2023-08-23 DIAGNOSIS — Z53.9 DIAGNOSIS NOT YET DEFINED: Primary | ICD-10-CM

## 2023-08-23 DIAGNOSIS — G89.4 CHRONIC PAIN SYNDROME: ICD-10-CM

## 2023-08-23 PROCEDURE — G0180 MD CERTIFICATION HHA PATIENT: HCPCS | Performed by: FAMILY MEDICINE

## 2023-08-23 RX ORDER — NORTRIPTYLINE HYDROCHLORIDE 50 MG/1
50 CAPSULE ORAL AT BEDTIME
Qty: 90 CAPSULE | Refills: 3 | Status: SHIPPED | OUTPATIENT
Start: 2023-08-23 | End: 2023-12-04

## 2023-08-23 NOTE — TELEPHONE ENCOUNTER
Sending to PCP for review.  Please review and advise on patient MyChart message.    Please see Discharge Summary dated 7/6/2023.    STOP taking these medications    nortriptyline (PAMELOR) 50 MG capsule Comments:  Reason for Stopping:    Please advise of refill.     Jadiel Negron RN  [facility]

## 2023-08-23 NOTE — TELEPHONE ENCOUNTER
I will send the new prescription for nortriptyline 50 mg tablet to be taken daily.  Likely it was discontinued during transitional care stay    Mara Murillo MD

## 2023-08-24 ENCOUNTER — HOSPITAL ENCOUNTER (EMERGENCY)
Facility: HOSPITAL | Age: 78
Discharge: HOME | End: 2023-08-24
Attending: STUDENT IN AN ORGANIZED HEALTH CARE EDUCATION/TRAINING PROGRAM | Admitting: STUDENT IN AN ORGANIZED HEALTH CARE EDUCATION/TRAINING PROGRAM
Payer: COMMERCIAL

## 2023-08-24 ENCOUNTER — APPOINTMENT (OUTPATIENT)
Dept: RADIOLOGY | Facility: HOSPITAL | Age: 78
End: 2023-08-24
Attending: STUDENT IN AN ORGANIZED HEALTH CARE EDUCATION/TRAINING PROGRAM
Payer: COMMERCIAL

## 2023-08-24 VITALS
HEART RATE: 64 BPM | OXYGEN SATURATION: 96 % | SYSTOLIC BLOOD PRESSURE: 136 MMHG | DIASTOLIC BLOOD PRESSURE: 63 MMHG | WEIGHT: 110 LBS | RESPIRATION RATE: 19 BRPM | HEIGHT: 66 IN | TEMPERATURE: 98.2 F | BODY MASS INDEX: 17.68 KG/M2

## 2023-08-24 DIAGNOSIS — N39.0 UTI (URINARY TRACT INFECTION), BACTERIAL: ICD-10-CM

## 2023-08-24 DIAGNOSIS — A49.9 UTI (URINARY TRACT INFECTION), BACTERIAL: ICD-10-CM

## 2023-08-24 LAB
ALBUMIN SERPL BCG-MCNC: 3.6 G/DL (ref 3.5–5.2)
ALBUMIN UR-MCNC: NEGATIVE MG/DL
ALP SERPL-CCNC: 150 U/L (ref 35–104)
ALT SERPL W P-5'-P-CCNC: 12 U/L (ref 0–50)
ANION GAP SERPL CALCULATED.3IONS-SCNC: 9 MMOL/L (ref 7–15)
APPEARANCE UR: CLEAR
AST SERPL W P-5'-P-CCNC: 20 U/L (ref 0–45)
BACTERIA #/AREA URNS HPF: ABNORMAL /HPF
BASOPHILS # BLD AUTO: 0.1 10E3/UL (ref 0–0.2)
BASOPHILS NFR BLD AUTO: 1 %
BILIRUB DIRECT SERPL-MCNC: <0.2 MG/DL (ref 0–0.3)
BILIRUB SERPL-MCNC: <0.2 MG/DL
BILIRUB UR QL STRIP: NEGATIVE
BUN SERPL-MCNC: 15.7 MG/DL (ref 8–23)
CALCIUM SERPL-MCNC: 9 MG/DL (ref 8.8–10.2)
CHLORIDE SERPL-SCNC: 106 MMOL/L (ref 98–107)
COLOR UR AUTO: YELLOW
CREAT SERPL-MCNC: 0.77 MG/DL (ref 0.51–0.95)
DEPRECATED HCO3 PLAS-SCNC: 27 MMOL/L (ref 22–29)
EOSINOPHIL # BLD AUTO: 0.4 10E3/UL (ref 0–0.7)
EOSINOPHIL NFR BLD AUTO: 7 %
ERYTHROCYTE [DISTWIDTH] IN BLOOD BY AUTOMATED COUNT: 14.6 % (ref 10–15)
GFR SERPL CREATININE-BSD FRML MDRD: 79 ML/MIN/1.73M2
GLUCOSE SERPL-MCNC: 122 MG/DL (ref 70–99)
GLUCOSE UR STRIP-MCNC: NEGATIVE MG/DL
HCT VFR BLD AUTO: 43.4 % (ref 35–47)
HGB BLD-MCNC: 13.1 G/DL (ref 11.7–15.7)
HGB UR QL STRIP: NEGATIVE
IMM GRANULOCYTES # BLD: 0 10E3/UL
IMM GRANULOCYTES NFR BLD: 0 %
KETONES UR STRIP-MCNC: NEGATIVE MG/DL
LEUKOCYTE ESTERASE UR QL STRIP: ABNORMAL
LYMPHOCYTES # BLD AUTO: 2 10E3/UL (ref 0.8–5.3)
LYMPHOCYTES NFR BLD AUTO: 36 %
MCH RBC QN AUTO: 26.9 PG (ref 26.5–33)
MCHC RBC AUTO-ENTMCNC: 30.2 G/DL (ref 31.5–36.5)
MCV RBC AUTO: 89 FL (ref 78–100)
MONOCYTES # BLD AUTO: 0.6 10E3/UL (ref 0–1.3)
MONOCYTES NFR BLD AUTO: 10 %
MUCOUS THREADS #/AREA URNS LPF: PRESENT /LPF
NEUTROPHILS # BLD AUTO: 2.6 10E3/UL (ref 1.6–8.3)
NEUTROPHILS NFR BLD AUTO: 46 %
NITRATE UR QL: NEGATIVE
NRBC # BLD AUTO: 0 10E3/UL
NRBC BLD AUTO-RTO: 0 /100
PH UR STRIP: 5.5 [PH] (ref 5–7)
PLATELET # BLD AUTO: 326 10E3/UL (ref 150–450)
POTASSIUM SERPL-SCNC: 3.8 MMOL/L (ref 3.4–5.3)
PROT SERPL-MCNC: 6.7 G/DL (ref 6.4–8.3)
RBC # BLD AUTO: 4.87 10E6/UL (ref 3.8–5.2)
RBC URINE: <1 /HPF
SODIUM SERPL-SCNC: 142 MMOL/L (ref 136–145)
SP GR UR STRIP: 1.02 (ref 1–1.03)
TROPONIN T SERPL HS-MCNC: 11 NG/L
UROBILINOGEN UR STRIP-MCNC: <2 MG/DL
WBC # BLD AUTO: 5.7 10E3/UL (ref 4–11)
WBC URINE: 1 /HPF

## 2023-08-24 PROCEDURE — 84484 ASSAY OF TROPONIN QUANT: CPT | Performed by: STUDENT IN AN ORGANIZED HEALTH CARE EDUCATION/TRAINING PROGRAM

## 2023-08-24 PROCEDURE — 250N000013 HC RX MED GY IP 250 OP 250 PS 637: Performed by: STUDENT IN AN ORGANIZED HEALTH CARE EDUCATION/TRAINING PROGRAM

## 2023-08-24 PROCEDURE — 96361 HYDRATE IV INFUSION ADD-ON: CPT

## 2023-08-24 PROCEDURE — 85025 COMPLETE CBC W/AUTO DIFF WBC: CPT | Performed by: STUDENT IN AN ORGANIZED HEALTH CARE EDUCATION/TRAINING PROGRAM

## 2023-08-24 PROCEDURE — 96360 HYDRATION IV INFUSION INIT: CPT

## 2023-08-24 PROCEDURE — 82248 BILIRUBIN DIRECT: CPT | Performed by: STUDENT IN AN ORGANIZED HEALTH CARE EDUCATION/TRAINING PROGRAM

## 2023-08-24 PROCEDURE — 258N000003 HC RX IP 258 OP 636: Performed by: STUDENT IN AN ORGANIZED HEALTH CARE EDUCATION/TRAINING PROGRAM

## 2023-08-24 PROCEDURE — 93005 ELECTROCARDIOGRAM TRACING: CPT | Performed by: STUDENT IN AN ORGANIZED HEALTH CARE EDUCATION/TRAINING PROGRAM

## 2023-08-24 PROCEDURE — 71046 X-RAY EXAM CHEST 2 VIEWS: CPT

## 2023-08-24 PROCEDURE — 82310 ASSAY OF CALCIUM: CPT | Performed by: STUDENT IN AN ORGANIZED HEALTH CARE EDUCATION/TRAINING PROGRAM

## 2023-08-24 PROCEDURE — 81001 URINALYSIS AUTO W/SCOPE: CPT | Performed by: STUDENT IN AN ORGANIZED HEALTH CARE EDUCATION/TRAINING PROGRAM

## 2023-08-24 PROCEDURE — 36415 COLL VENOUS BLD VENIPUNCTURE: CPT | Performed by: STUDENT IN AN ORGANIZED HEALTH CARE EDUCATION/TRAINING PROGRAM

## 2023-08-24 PROCEDURE — 99285 EMERGENCY DEPT VISIT HI MDM: CPT | Mod: 25

## 2023-08-24 RX ORDER — CEFUROXIME AXETIL 250 MG/1
250 TABLET ORAL 2 TIMES DAILY
Qty: 10 TABLET | Refills: 0 | Status: SHIPPED | OUTPATIENT
Start: 2023-08-24 | End: 2023-08-29

## 2023-08-24 RX ORDER — CEFUROXIME AXETIL 250 MG/1
250 TABLET ORAL ONCE
Status: COMPLETED | OUTPATIENT
Start: 2023-08-24 | End: 2023-08-24

## 2023-08-24 RX ADMIN — SODIUM CHLORIDE 500 ML: 9 INJECTION, SOLUTION INTRAVENOUS at 17:28

## 2023-08-24 RX ADMIN — CEFUROXIME AXETIL 250 MG: 250 TABLET ORAL at 20:30

## 2023-08-24 ASSESSMENT — ACTIVITIES OF DAILY LIVING (ADL)
ADLS_ACUITY_SCORE: 35
ADLS_ACUITY_SCORE: 41
ADLS_ACUITY_SCORE: 35

## 2023-08-24 NOTE — ED PROVIDER NOTES
EMERGENCY DEPARTMENT ENCOUNTER      NAME: Julisa Chaney  AGE: 78 year old female  YOB: 1945  MRN: [id number]  EVALUATION DATE & TIME: 8/24/2023  4:08 PM    PCP: Mara Murillo    ED PROVIDER: Bill Hussein MD      Chief Complaint   Patient presents with    Generalized Weakness         FINAL IMPRESSION:  No diagnosis found.      ED COURSE & MEDICAL DECISION MAKING:    Pertinent Labs & Imaging studies reviewed. (See chart for details)  78 year old female presents to the Emergency Department for evaluation of weakness    Patient is a 28-year-old female with history of hyperlipidemia, chronic pain who presents to the emergency department for evaluation of weakness.  Patient states that she has been feeling generalized weak over the day today with some occasional shaking although she denies any specific fevers.  Denies any respiratory symptoms.  No chest pain or shortness of breath.  No hematuria or dysuria.  Denies any abdominal pain.  No falls at home.  However, daughter also provides also obtain some additional collateral information from the daughter states that in the past patient's presented with similar vague weakness when she is developing pneumonia and was recently treated for pneumonia several weeks ago.    On exam patient is well-appearing in no acute distress.  She has clear lungs without any wheezes or rales.  Abdomen is soft and benign without any guarding or rigidity.  No abdominal bruising.     initial differentials broad and includes but is not limited to atypical ACS, occult underlying infection such as pneumonia or urinary tract infection, metabolic derangement or dehydration.  We will obtain a metabolic work-up as well as chest x-ray, EKG and troponin.    EKG shows a sinus rhythm without any acute ischemic changes and appears relatively similar to prior.  Troponin is negative and low suspicion for ACS as cause of symptoms.    Labs reviewed and interpreted myself.  No significant metabolic  derangement.  Slight increase in creatinine to 0.77 from a baseline around 0.6.  CBC shows a normal white count and stable hemoglobin.  Chest x-ray shows an improved right sided pneumonia but no signs of acute pneumonia or other pulmonary process.  Upon reevaluation patient endorses mild improvement in her symptoms.  Urinalysis is still pending at this point in time.   If urinalysis is unremarkable and patient symptoms continue to improve she will likely be safe for discharge home with close follow-up with her primary care doctor.  Final disposition was pending at the end of my shift patient was signed out to a colleague of [facility] who update documentation and treat plan as needed.    Update.  Urinalysis resulted with a few white cells and few bacteria which could represent possible early urinary tract infection.  Although patient is complaining of any symptoms of UTI she does have a history of generalized fatigue in response to early infections and given her frailty and it is reasonable to initiate a short course of empiric antibiotics for treatment of urinary tract infection.  Discussed findings with the patient she feels comfortable with initiating oral antibiotics with cefuroxime with first dose in the emergency department and to continue a 5-day course at home with close observation of her symptoms.  If she develops fevers, inability tolerate oral intake, worsening abdominal or flank pain or other new or concerning symptoms she is encouraged to return to emergency promptly for repeat evaluation.  Otherwise plans to follow-up with her primary care doctor.       4:54 PM I introduced myself to the patient, obtained patient history, performed a physical exam, and discussed plan for ED workup including potential diagnostic laboratory/imaging studies and interventions.     Medical Decision Making    History:  Supplemental history from: Documented in chart, if applicable  External Record(s) reviewed: Documented in chart,  if applicable.    Work Up:  Chart documentation includes differential considered and any EKGs or imaging independently interpreted by provider, where specified.  In additional to work up documented, I considered the following work up: Documented in chart, if applicable.    External consultation:  Discussion of management with another provider: Documented in chart, if applicable    Complicating factors:  Care impacted by chronic illness: N/A  Care affected by social determinants of health: N/A    Disposition considerations: Admission considered. Patient was signed out to the oncoming physician, disposition pending.        At the conclusion of the encounter I discussed the results of all of the tests and the disposition. The questions were answered. The patient or family acknowledged understanding and was agreeable with the care plan.         MEDICATIONS GIVEN IN THE EMERGENCY:  Medications - No data to display    NEW PRESCRIPTIONS STARTED AT TODAY'S ER VISIT  New Prescriptions    No medications on file          =================================================================    HPI    Patient information was obtained from: Patient and Daughter    Use of : N/A       Julisa Chaney is a 78 year old female with a pertinent history of hyperlipidemia, chronic pain, hypercalcemia, and osteopenia who presents to this ED by wheelchair for evaluation of generalized weakness.     Per daughter: The patient was at PT yesterday and was able to transfer like usual. This morning she had increased weakness and shakiness. She was unable to hold a glass still or get her glasses to her face.     The patient states that she just feels off today. She was so shaky she couldn't do anything. Denies feer, cough, abdominal pain, URI symptoms, new pain, chest pain, shortness of breath, recent falls or injury, and not around anyone that has been sick. Quit smoking in 2014 after a scare.       REVIEW OF SYSTEMS   Refer to the  HPI    PAST MEDICAL HISTORY:  Past Medical History:   Diagnosis Date    Aspiration pneumonia (H) 02/2022    Depression     High cholesterol     Migraines     Pyloric ulcer, chronic     Sepsis (H) 08/10/2020    Trigeminal neuralgia        PAST SURGICAL HISTORY:  Past Surgical History:   Procedure Laterality Date    HYSTERECTOMY      IR GASTROSTOMY TUBE PERCUTANEOUS PLCMNT  8/16/2022    PICC TRIPLE LUMEN PLACEMENT  7/22/2022         NH ESOPHAGOGASTRODUODENOSCOPY TRANSORAL DIAGNOSTIC N/A 8/13/2020    Procedure: ESOPHAGOGASTRODUODENOSCOPY (EGD);  Surgeon: Nathan Smalls MD;  Location: [facility];  Service: Gastroenterology    NH ESOPHAGOGASTRODUODENOSCOPY TRANSORAL DIAGNOSTIC N/A 8/14/2020    Procedure: ESOPHAGOGASTRODUODENOSCOPY (EGD), PYLORIC DILATION;  Surgeon: Nathan Smalls MD;  Location: [facility];  Service: Gastroenterology    VENTRICULOSTOMY Left 7/31/2022    Procedure: LEFT FRONTAL VENTRICULOSTOMY;  Surgeon: Brady Heredia MD;  Location: [facility] COLONOSCOPY W/WO BRUSH/WASH N/A 8/13/2020    Procedure: COLONOSCOPY WITH POLYPECTOMY;  Surgeon: Nathan Smalls MD;  Location: [facility];  Service: Gastroenterology           CURRENT MEDICATIONS:    acetaminophen-codeine (TYLENOL #3) 300-30 MG per tablet  desonide (DESOWEN) 0.05 % external cream  ferrous sulfate (FEROSUL) 325 (65 Fe) MG tablet  gabapentin (NEURONTIN) 100 MG capsule  levETIRAcetam (KEPPRA) 750 MG tablet  meclizine (ANTIVERT) 12.5 MG tablet  mirtazapine (REMERON) 15 MG tablet  Multiple Vitamin (MULTIVITAMIN) TABS  nortriptyline (PAMELOR) 50 MG capsule  omeprazole (PRILOSEC) 40 MG DR capsule  OXcarbazepine (TRILEPTAL) 150 MG tablet  polyvinyl alcohol (ARTIFICIAL TEARS) 1.4 % ophthalmic solution  QUEtiapine (SEROQUEL) 50 MG tablet  senna-docusate (STIMULANT LAXATIVE) 8.6-50 MG tablet  tamsulosin (FLOMAX) 0.4 MG capsule  topiramate (TOPAMAX) 50 MG tablet  Vitamin D3 50 mcg (2000 units)  "tablet        ALLERGIES:  Allergies   Allergen Reactions    Contrast [Iohexol] Rash     Noticed during 2020 admission. Received IV contrast on     Chocolate Headache    Contrast Dye     Penicillins Rash       FAMILY HISTORY:  Family History   Problem Relation Age of Onset    Cancer Father     Testicular cancer Grandchild 17.00    Breast Cancer Mother     Breast Cancer Sister     Breast Cancer Maternal Aunt     Breast Cancer Sister        SOCIAL HISTORY:   Social History     Socioeconomic History    Marital status:    Tobacco Use    Smoking status: Former     Packs/day: 1.00     Years: 50.00     Pack years: 50.00     Types: Cigarettes     Quit date: 2014     Years since quittin.7    Smokeless tobacco: Never   Substance and Sexual Activity    Alcohol use: No    Drug use: No   Social History Narrative    Lives alone in the house, relay in TV dinner  grandkids help with house maintenance  Daughter lives close by.  Mara Murillo MD 2018 1:49 PM         VITALS:  /58   Pulse 72   Temp 98.2  F (36.8  C) (Oral)   Resp 27   Ht 1.676 m (5' 6\")   Wt 49.9 kg (110 lb)   SpO2 91%   BMI 17.75 kg/m      PHYSICAL EXAM    Constitutional: Well developed, Well nourished, NAD,  HENT: Normocephalic, Atraumatic, Oropharynx normal, mucous membranes moist, Nose normal.   Neck- trachea midline, No stridor.    Eyes:EOMI, Conjunctiva normal, No discharge.   Respiratory: Normal breath sounds, No respiratory distress, No wheezing, Speaks full sentences easily. No cough.    Cardiovascular: Normal heart rate, Regular rhythm,  No murmurs, No rubs, No gallops. Chest wall nontender.    Abdominal: No excessive obesity. , Soft, No tenderness, No masses, No flank tenderness. No rebound or guarding.     Musculoskeletal: 2+ DP pulses. No edema. No cyanosis,   Integument: Warm, Dry, No erythema, No rash.   Neurologic: Alert & oriented x 3   Psychiatric: Affect normal, Judgment normal, Mood normal. Cooperative.    "   LAB:  All pertinent labs reviewed and interpreted.       RADIOLOGY:  Reviewed all pertinent imaging. Please see official radiology report.  No orders to display       EKG:    Performed at: 1729    Impression: Sinus rhythm with no acute ischemic changes    Rate: 63 beats a minute  Rhythm: Sinus rhythm  Axis: Normal axis  DC Interval: DC interval 172 ms  QRS Interval: 72 ms  QTc Interval: 437 ms  ST Changes: No ST elevations  Comparison: EKG is appears somewhat similar to when compared to prior.    I have independently reviewed and interpreted the EKG(s) documented above.    PROCEDURES:         Virtual Web System Documentation:   CMS Diagnoses:              I, Paul Bright, am serving as a scribe to document services personally performed by Bill Hussein MD based on my observation and the provider's statements to me. I, Bill Hussein MD, attest that Paul Bright is acting in a scribe capacity, has observed my performance of the services and has documented them in accordance with my direction.    Bill Hussein MD  [facility] EMERGENCY DEPARTMENT  [facility] [facility] [id number] [phone]      Bill Hussein MD  08/24/23 8221       Bill Hussein MD  08/24/23 0993

## 2023-08-24 NOTE — ED TRIAGE NOTES
Patient comes in with complaints of weakness and is feeling like when she get pneumonia   Here with daughter     Triage Assessment       Row Name 08/24/23 2105       Triage Assessment (Adult)    Airway WDL WDL       Respiratory WDL    Respiratory WDL WDL       Skin Circulation/Temperature WDL    Skin Circulation/Temperature WDL WDL       Cardiac WDL    Cardiac WDL WDL       Peripheral/Neurovascular WDL    Peripheral Neurovascular WDL WDL       Cognitive/Neuro/Behavioral WDL    Cognitive/Neuro/Behavioral WDL WDL

## 2023-08-25 NOTE — TELEPHONE ENCOUNTER
See MyChart from Patient needing PCP review.  Please respond directly to patient, if at all able.      Should I let daughter know that you are decreasing medication?          ABRIL Love  [facility]

## 2023-08-25 NOTE — TELEPHONE ENCOUNTER
Patient currently admitted at the  hospital will recommend continue nortriptyline just once a day for now at that time  We will increase the dose as needed    Mara Murillo MD

## 2023-09-05 DIAGNOSIS — K21.9 GASTROESOPHAGEAL REFLUX DISEASE WITHOUT ESOPHAGITIS: ICD-10-CM

## 2023-09-05 NOTE — PROVIDER NOTIFICATION
X Cover    Notified by radiology that CT scan tonight had new hydrocephalus after removal of drain. Discussed with nursing staff who will page on-call neurosurgery to review images and advise treatment, if any.    Pelon Moralez MD PhD   Detail Level: Simple Render Risk Assessment In Note?: no Additional Notes: Per pt, previously biopsied and results were benign - requested records

## 2023-09-06 RX ORDER — OMEPRAZOLE 40 MG/1
40 CAPSULE, DELAYED RELEASE ORAL DAILY
Qty: 90 CAPSULE | Refills: 3 | Status: SHIPPED | OUTPATIENT
Start: 2023-09-06 | End: 2024-01-06

## 2023-09-10 LAB
ATRIAL RATE - MUSE: 63 BPM
DIASTOLIC BLOOD PRESSURE - MUSE: NORMAL MMHG
INTERPRETATION ECG - MUSE: NORMAL
P AXIS - MUSE: 91 DEGREES
PR INTERVAL - MUSE: 172 MS
QRS DURATION - MUSE: 72 MS
QT - MUSE: 428 MS
QTC - MUSE: 437 MS
R AXIS - MUSE: 7 DEGREES
SYSTOLIC BLOOD PRESSURE - MUSE: NORMAL MMHG
T AXIS - MUSE: 35 DEGREES
VENTRICULAR RATE- MUSE: 63 BPM

## 2023-09-11 ENCOUNTER — MYC REFILL (OUTPATIENT)
Dept: FAMILY MEDICINE | Facility: CLINIC | Age: 78
End: 2023-09-11
Payer: COMMERCIAL

## 2023-09-11 DIAGNOSIS — R42 DIZZINESS: ICD-10-CM

## 2023-09-11 DIAGNOSIS — F32.0 CURRENT MILD EPISODE OF MAJOR DEPRESSIVE DISORDER WITHOUT PRIOR EPISODE (H): ICD-10-CM

## 2023-09-11 RX ORDER — MECLIZINE HCL 12.5 MG 12.5 MG/1
12.5 TABLET ORAL 2 TIMES DAILY
Qty: 60 TABLET | Refills: 0 | Status: SHIPPED | OUTPATIENT
Start: 2023-09-11 | End: 2023-10-09

## 2023-09-11 RX ORDER — MIRTAZAPINE 15 MG/1
15 TABLET, FILM COATED ORAL AT BEDTIME
Qty: 30 TABLET | Refills: 0 | Status: SHIPPED | OUTPATIENT
Start: 2023-09-11 | End: 2023-10-09

## 2023-09-11 NOTE — TELEPHONE ENCOUNTER
"Routing refill request to provider for review/approval because:  PHQ9 needs updating  Pcp review    Last Written Prescription Date:  7/21/23  Last Fill Quantity: 60,  # refills: 0   Last office visit provider:  6/1/23     Last Written Prescription Date:  7/21/23  Last Fill Quantity: 30,  # refills: 0   Last office visit provider:  6/1/23     Requested Prescriptions   Pending Prescriptions Disp Refills    meclizine (ANTIVERT) 12.5 MG tablet 60 tablet 0     Sig: Take 1 tablet (12.5 mg) by mouth 2 times daily        Antivertigo/Antiemetic Agents Passed - 9/11/2023  3:51 AM        Passed - Recent (12 mo) or future (30 days) visit within the authorizing provider's specialty     Patient has had an office visit with the authorizing provider or a provider within the authorizing providers department within the previous 12 mos or has a future within next 30 days. See \"Patient Info\" tab in inbasket, or \"Choose Columns\" in Meds & Orders section of the refill encounter.              Passed - Medication is active on med list        Passed - Patient is 18 years of age or older          mirtazapine (REMERON) 15 MG tablet 30 tablet 0     Sig: Take 1 tablet (15 mg) by mouth At Bedtime       Atypical Antidepressants Protocol Failed - 9/11/2023  3:51 AM        Failed - Patient has PHQ-9 score less than 5 in past 6 months.     Please review last PHQ-9 score.           Passed - Medication active on med list        Passed - Patient is age 18 or older        Passed - No active pregnancy on record        Passed - No positive pregnancy test in past 12 mos        Passed - Recent (6 mo) or future (30 days) visit within the authorizing provider's specialty     Patient had office visit in the last 6 months or has a visit in the next 30 days with authorizing provider or within the authorizing provider's specialty.  See \"Patient Info\" tab in inbasket, or \"Choose Columns\" in Meds & Orders section of the refill encounter.                 Karishma RODRIGUEZ" ABRIL Lopez 09/11/23 3:51 AM

## 2023-09-12 ENCOUNTER — MYC REFILL (OUTPATIENT)
Dept: FAMILY MEDICINE | Facility: CLINIC | Age: 78
End: 2023-09-12
Payer: COMMERCIAL

## 2023-09-12 DIAGNOSIS — G43.719 INTRACTABLE CHRONIC MIGRAINE WITHOUT AURA AND WITHOUT STATUS MIGRAINOSUS: ICD-10-CM

## 2023-09-12 RX ORDER — TOPIRAMATE 50 MG/1
50 TABLET, FILM COATED ORAL AT BEDTIME
Qty: 30 TABLET | Refills: 0 | Status: SHIPPED | OUTPATIENT
Start: 2023-09-12 | End: 2023-10-09

## 2023-09-12 RX ORDER — TOPIRAMATE 50 MG/1
50 TABLET, FILM COATED ORAL AT BEDTIME
Qty: 90 TABLET | OUTPATIENT
Start: 2023-09-12

## 2023-09-24 ENCOUNTER — MYC REFILL (OUTPATIENT)
Dept: FAMILY MEDICINE | Facility: CLINIC | Age: 78
End: 2023-09-24
Payer: COMMERCIAL

## 2023-09-24 DIAGNOSIS — G51.8 FACIAL NEURALGIA: ICD-10-CM

## 2023-09-24 DIAGNOSIS — G47.00 PERSISTENT INSOMNIA: ICD-10-CM

## 2023-09-24 DIAGNOSIS — G50.0 TRIGEMINAL NEURALGIA: ICD-10-CM

## 2023-09-24 DIAGNOSIS — F41.9 ANXIETY: ICD-10-CM

## 2023-09-24 DIAGNOSIS — G89.4 CHRONIC PAIN SYNDROME: ICD-10-CM

## 2023-09-24 DIAGNOSIS — D50.0 IRON DEFICIENCY ANEMIA DUE TO CHRONIC BLOOD LOSS: ICD-10-CM

## 2023-09-25 ENCOUNTER — MEDICAL CORRESPONDENCE (OUTPATIENT)
Dept: HEALTH INFORMATION MANAGEMENT | Facility: CLINIC | Age: 78
End: 2023-09-25

## 2023-09-25 DIAGNOSIS — G51.8 FACIAL NEURALGIA: ICD-10-CM

## 2023-09-25 RX ORDER — OXCARBAZEPINE 150 MG/1
450 TABLET, FILM COATED ORAL DAILY
Qty: 90 TABLET | Refills: 0 | Status: SHIPPED | OUTPATIENT
Start: 2023-09-25 | End: 2023-09-26

## 2023-09-25 RX ORDER — FERROUS SULFATE 325(65) MG
325 TABLET ORAL
Qty: 30 TABLET | Refills: 1 | Status: SHIPPED | OUTPATIENT
Start: 2023-09-25 | End: 2024-01-06

## 2023-09-25 RX ORDER — QUETIAPINE FUMARATE 50 MG/1
50 TABLET, FILM COATED ORAL AT BEDTIME
Qty: 30 TABLET | Refills: 0 | Status: SHIPPED | OUTPATIENT
Start: 2023-09-25 | End: 2023-10-26

## 2023-09-25 RX ORDER — LEVETIRACETAM 750 MG/1
750 TABLET ORAL 2 TIMES DAILY
Qty: 60 TABLET | Refills: 0 | Status: SHIPPED | OUTPATIENT
Start: 2023-09-25 | End: 2023-12-04

## 2023-09-25 RX ORDER — ACETAMINOPHEN AND CODEINE PHOSPHATE 300; 30 MG/1; MG/1
1 TABLET ORAL EVERY 6 HOURS PRN
Qty: 120 TABLET | Refills: 0 | Status: SHIPPED | OUTPATIENT
Start: 2023-09-25 | End: 2023-10-26

## 2023-09-26 RX ORDER — OXCARBAZEPINE 150 MG/1
TABLET, FILM COATED ORAL
Qty: 270 TABLET | Refills: 1 | Status: SHIPPED | OUTPATIENT
Start: 2023-09-26 | End: 2024-03-03

## 2023-10-06 ENCOUNTER — MYC MEDICAL ADVICE (OUTPATIENT)
Dept: FAMILY MEDICINE | Facility: CLINIC | Age: 78
End: 2023-10-06
Payer: COMMERCIAL

## 2023-10-09 ENCOUNTER — MYC REFILL (OUTPATIENT)
Dept: FAMILY MEDICINE | Facility: CLINIC | Age: 78
End: 2023-10-09
Payer: COMMERCIAL

## 2023-10-09 DIAGNOSIS — F32.0 CURRENT MILD EPISODE OF MAJOR DEPRESSIVE DISORDER WITHOUT PRIOR EPISODE (H): ICD-10-CM

## 2023-10-09 DIAGNOSIS — R42 DIZZINESS: ICD-10-CM

## 2023-10-09 DIAGNOSIS — G43.719 INTRACTABLE CHRONIC MIGRAINE WITHOUT AURA AND WITHOUT STATUS MIGRAINOSUS: ICD-10-CM

## 2023-10-09 RX ORDER — MECLIZINE HCL 12.5 MG 12.5 MG/1
12.5 TABLET ORAL 2 TIMES DAILY
Qty: 60 TABLET | Refills: 1 | Status: SHIPPED | OUTPATIENT
Start: 2023-10-09 | End: 2023-12-04

## 2023-10-09 NOTE — TELEPHONE ENCOUNTER
See MyChart from Patient needing PCP reponse.    Patients daughter inquiring about increasing medication again d/t jaw pain acting up?    ABRIL Love  [facility]

## 2023-10-10 RX ORDER — TOPIRAMATE 50 MG/1
50 TABLET, FILM COATED ORAL AT BEDTIME
Qty: 30 TABLET | Refills: 0 | Status: SHIPPED | OUTPATIENT
Start: 2023-10-10 | End: 2023-12-04

## 2023-10-10 RX ORDER — MIRTAZAPINE 15 MG/1
15 TABLET, FILM COATED ORAL AT BEDTIME
Qty: 30 TABLET | Refills: 0 | Status: SHIPPED | OUTPATIENT
Start: 2023-10-10 | End: 2023-12-04

## 2023-10-11 DIAGNOSIS — Z53.9 DIAGNOSIS NOT YET DEFINED: Primary | ICD-10-CM

## 2023-10-11 PROCEDURE — G0179 MD RECERTIFICATION HHA PT: HCPCS | Performed by: FAMILY MEDICINE

## 2023-10-16 ENCOUNTER — HOSPITAL ENCOUNTER (EMERGENCY)
Facility: HOSPITAL | Age: 78
Discharge: HOME | End: 2023-10-16
Attending: EMERGENCY MEDICINE | Admitting: EMERGENCY MEDICINE
Payer: COMMERCIAL

## 2023-10-16 VITALS
SYSTOLIC BLOOD PRESSURE: 115 MMHG | OXYGEN SATURATION: 97 % | WEIGHT: 111 LBS | RESPIRATION RATE: 18 BRPM | HEART RATE: 69 BPM | HEIGHT: 66 IN | BODY MASS INDEX: 17.84 KG/M2 | DIASTOLIC BLOOD PRESSURE: 58 MMHG | TEMPERATURE: 97.6 F

## 2023-10-16 DIAGNOSIS — R19.7 DIARRHEA, UNSPECIFIED TYPE: ICD-10-CM

## 2023-10-16 LAB
ADV 40+41 DNA STL QL NAA+NON-PROBE: NEGATIVE
ANION GAP SERPL CALCULATED.3IONS-SCNC: 13 MMOL/L (ref 7–15)
ASTRO TYP 1-8 RNA STL QL NAA+NON-PROBE: NEGATIVE
BASO+EOS+MONOS # BLD AUTO: ABNORMAL 10*3/UL
BASO+EOS+MONOS NFR BLD AUTO: ABNORMAL %
BASOPHILS # BLD AUTO: 0 10E3/UL (ref 0–0.2)
BASOPHILS NFR BLD AUTO: 1 %
BUN SERPL-MCNC: 12.9 MG/DL (ref 8–23)
C CAYETANENSIS DNA STL QL NAA+NON-PROBE: NEGATIVE
C DIFF TOX B STL QL: NEGATIVE
CALCIUM SERPL-MCNC: 9.3 MG/DL (ref 8.8–10.2)
CAMPYLOBACTER DNA SPEC NAA+PROBE: NEGATIVE
CHLORIDE SERPL-SCNC: 106 MMOL/L (ref 98–107)
CREAT SERPL-MCNC: 0.67 MG/DL (ref 0.51–0.95)
CRYPTOSP DNA STL QL NAA+NON-PROBE: NEGATIVE
DEPRECATED HCO3 PLAS-SCNC: 25 MMOL/L (ref 22–29)
E COLI O157 DNA STL QL NAA+NON-PROBE: ABNORMAL
E HISTOLYT DNA STL QL NAA+NON-PROBE: NEGATIVE
EAEC ASTA GENE ISLT QL NAA+PROBE: NEGATIVE
EC STX1+STX2 GENES STL QL NAA+NON-PROBE: NEGATIVE
EGFRCR SERPLBLD CKD-EPI 2021: 89 ML/MIN/1.73M2
EOSINOPHIL # BLD AUTO: 0.1 10E3/UL (ref 0–0.7)
EOSINOPHIL NFR BLD AUTO: 1 %
EPEC EAE GENE STL QL NAA+NON-PROBE: NEGATIVE
ERYTHROCYTE [DISTWIDTH] IN BLOOD BY AUTOMATED COUNT: 14.2 % (ref 10–15)
ETEC LTA+ST1A+ST1B TOX ST NAA+NON-PROBE: NEGATIVE
G LAMBLIA DNA STL QL NAA+NON-PROBE: NEGATIVE
GLUCOSE SERPL-MCNC: 94 MG/DL (ref 70–99)
HCT VFR BLD AUTO: 44.1 % (ref 35–47)
HGB BLD-MCNC: 13.2 G/DL (ref 11.7–15.7)
IMM GRANULOCYTES # BLD: 0 10E3/UL
IMM GRANULOCYTES NFR BLD: 0 %
LYMPHOCYTES # BLD AUTO: 1.7 10E3/UL (ref 0.8–5.3)
LYMPHOCYTES NFR BLD AUTO: 27 %
MAGNESIUM SERPL-MCNC: 1.9 MG/DL (ref 1.7–2.3)
MCH RBC QN AUTO: 26 PG (ref 26.5–33)
MCHC RBC AUTO-ENTMCNC: 29.9 G/DL (ref 31.5–36.5)
MCV RBC AUTO: 87 FL (ref 78–100)
MONOCYTES # BLD AUTO: 0.7 10E3/UL (ref 0–1.3)
MONOCYTES NFR BLD AUTO: 10 %
NEUTROPHILS # BLD AUTO: 3.9 10E3/UL (ref 1.6–8.3)
NEUTROPHILS NFR BLD AUTO: 61 %
NOROVIRUS GI+II RNA STL QL NAA+NON-PROBE: POSITIVE
NRBC # BLD AUTO: 0 10E3/UL
NRBC BLD AUTO-RTO: 0 /100
P SHIGELLOIDES DNA STL QL NAA+NON-PROBE: NEGATIVE
PLATELET # BLD AUTO: 410 10E3/UL (ref 150–450)
POTASSIUM SERPL-SCNC: 3.1 MMOL/L (ref 3.4–5.3)
RBC # BLD AUTO: 5.07 10E6/UL (ref 3.8–5.2)
RVA RNA STL QL NAA+NON-PROBE: NEGATIVE
SALMONELLA SP RPOD STL QL NAA+PROBE: NEGATIVE
SAPO I+II+IV+V RNA STL QL NAA+NON-PROBE: NEGATIVE
SHIGELLA SP+EIEC IPAH ST NAA+NON-PROBE: NEGATIVE
SODIUM SERPL-SCNC: 144 MMOL/L (ref 135–145)
V CHOLERAE DNA SPEC QL NAA+PROBE: NEGATIVE
VIBRIO DNA SPEC NAA+PROBE: NEGATIVE
WBC # BLD AUTO: 6.4 10E3/UL (ref 4–11)
Y ENTEROCOL DNA STL QL NAA+PROBE: NEGATIVE

## 2023-10-16 PROCEDURE — 99283 EMERGENCY DEPT VISIT LOW MDM: CPT

## 2023-10-16 PROCEDURE — 85004 AUTOMATED DIFF WBC COUNT: CPT | Performed by: EMERGENCY MEDICINE

## 2023-10-16 PROCEDURE — 80048 BASIC METABOLIC PNL TOTAL CA: CPT | Performed by: EMERGENCY MEDICINE

## 2023-10-16 PROCEDURE — 83735 ASSAY OF MAGNESIUM: CPT | Performed by: EMERGENCY MEDICINE

## 2023-10-16 PROCEDURE — 87493 C DIFF AMPLIFIED PROBE: CPT | Mod: XU | Performed by: EMERGENCY MEDICINE

## 2023-10-16 PROCEDURE — 87507 IADNA-DNA/RNA PROBE TQ 12-25: CPT | Performed by: EMERGENCY MEDICINE

## 2023-10-16 ASSESSMENT — ACTIVITIES OF DAILY LIVING (ADL): ADLS_ACUITY_SCORE: 35

## 2023-10-16 NOTE — ED PROVIDER NOTES
EMERGENCY DEPARTMENT ENCOUNTER     NAME: Julisa Chaney   AGE: 78 year old female   YOB: 1945   MRN: [id number]   EVALUATION DATE & TIME: 10/16/2023  2:24 PM   PCP: Mara Murillo     Chief Complaint   Patient presents with    Diarrhea    Fall   :    FINAL IMPRESSION       1. Diarrhea, unspecified type           ED COURSE & MEDICAL DECISION MAKING    2:25 PM  Met and evaluated patient  Pertinent Labs & Imaging studies reviewed. (See chart for details)   78 year old female  presents to the Emergency Department for evaluation of 1 week of diarrhea.  Patient notes that she fell asleep in her chair and then slid out of the chair onto the floor.  She denied any injuries or hitting her head.  She notes that she got some rug burn on her left knee trying to get back up into her chair. Initial Vitals Reviewed. Initial exam notable for appearing patient in no acute distress.  She does have some dark black stool but admits to Pepto-Bismol usage which is likely the culprit.  I see a history of C. difficile in her chart so I did send C. difficile testing along with stool studies which are pending at time of disposition.  She is very nontoxic-appearing and I do not think we need to pursue any traumatic injury imaging concerns as it sounds like this was a very low mechanism more slipped to the ground that it was a fall.  Her hemoglobin is acceptable, the rest of her labs do not show significant dehydration or electrolyte abnormalities.  At this time I will discharge pending the stool studies which are still waiting for results.           At the conclusion of the encounter I discussed the results of all of the tests and the disposition. The questions were answered. The patient or family acknowledged understanding and was agreeable with the care plan.     0 minutes critical care time, see procedure note below for details if relevant    Medical Decision Making    History:  Supplemental history from: Documented in chart,  if applicable  External Record(s) reviewed: Documented in chart, if applicable.,  Last ED visit 8/24/2023    Work Up:  Chart documentation includes differential considered and any EKGs or imaging independently interpreted by provider, where specified.  In additional to work up documented, I considered the following work up: Documented in chart, if applicable.    External consultation:  Discussion of management with another provider: Documented in chart, if applicable    Complicating factors:  Care impacted by chronic illness: Mental Health  Care affected by social determinants of health: Access to Medical Care    Disposition considerations: Discharge. No recommendations on prescription strength medication(s). I considered admission, but ultimately discharged patient with reassuring work-up.        MEDICATIONS GIVEN IN THE EMERGENCY:   Medications - No data to display   NEW PRESCRIPTIONS STARTED AT TODAY'S ER VISIT   New Prescriptions    No medications on file     ================================================================   HISTORY OF PRESENT ILLNESS       Patient information was obtained from: patient   Use of Intrepreter: Geraldine  Julisa Chaney is a 78 year old female with history of migraines, pyloric ulcer, and sepsis who presents by walk in for the complaint of diarrhea and fall.     The patient complains of persistent diarrhea over the past week. They deny abdominal pain and are not taking any antibiotics. Pertaining to the fall, the patient fell asleep and slipped out of their wheelchair. From the fall they had rug burn on their knees. They deny hitting their head, and reports no head pain.     The patient has a history of osteopenia, high cholesterol and migraines and they are a former smoker.   ================================================================        PAST HISTORY     PAST MEDICAL HISTORY:   Past Medical History:   Diagnosis Date    Aspiration pneumonia (H) 02/2022    Depression     High  cholesterol     Migraines     Pyloric ulcer, chronic     Sepsis (H) 08/10/2020    Trigeminal neuralgia       PAST SURGICAL HISTORY:   Past Surgical History:   Procedure Laterality Date    HYSTERECTOMY      IR GASTROSTOMY TUBE PERCUTANEOUS PLCMNT  8/16/2022    PICC TRIPLE LUMEN PLACEMENT  7/22/2022         CT ESOPHAGOGASTRODUODENOSCOPY TRANSORAL DIAGNOSTIC N/A 8/13/2020    Procedure: ESOPHAGOGASTRODUODENOSCOPY (EGD);  Surgeon: Nathan Smalls MD;  Location: [facility] [place];  Service: Gastroenterology    CT ESOPHAGOGASTRODUODENOSCOPY TRANSORAL DIAGNOSTIC N/A 8/14/2020    Procedure: ESOPHAGOGASTRODUODENOSCOPY (EGD), PYLORIC DILATION;  Surgeon: Nathan Smalls MD;  Location: [facility] [place];  Service: Gastroenterology    VENTRICULOSTOMY Left 7/31/2022    Procedure: LEFT FRONTAL VENTRICULOSTOMY;  Surgeon: Brady Heredia MD;  Location: [facility] COLONOSCOPY W/WO BRUSH/WASH N/A 8/13/2020    Procedure: COLONOSCOPY WITH POLYPECTOMY;  Surgeon: Nathan Smalls MD;  Location: [facility] [place];  Service: Gastroenterology      CURRENT MEDICATIONS:   acetaminophen-codeine (TYLENOL #3) 300-30 MG per tablet  desonide (DESOWEN) 0.05 % external cream  ferrous sulfate (FEROSUL) 325 (65 Fe) MG tablet  gabapentin (NEURONTIN) 100 MG capsule  levETIRAcetam (KEPPRA) 750 MG tablet  meclizine (ANTIVERT) 12.5 MG tablet  mirtazapine (REMERON) 15 MG tablet  Multiple Vitamin (MULTIVITAMIN) TABS  nortriptyline (PAMELOR) 50 MG capsule  omeprazole (PRILOSEC) 40 MG DR capsule  OXcarbazepine (TRILEPTAL) 150 MG tablet  polyvinyl alcohol (ARTIFICIAL TEARS) 1.4 % ophthalmic solution  QUEtiapine (SEROQUEL) 50 MG tablet  senna-docusate (STIMULANT LAXATIVE) 8.6-50 MG tablet  tamsulosin (FLOMAX) 0.4 MG capsule  topiramate (TOPAMAX) 50 MG tablet  Vitamin D3 50 mcg (2000 units) tablet      ALLERGIES:   Allergies   Allergen Reactions    Contrast [Iohexol] Rash     Noticed during 08/2020 admission. Received IV contrast on 8/5    Chocolate  "Headache    Contrast Dye     Penicillins Rash      FAMILY HISTORY:   Family History   Problem Relation Age of Onset    Cancer Father     Testicular cancer Grandchild 17.00    Breast Cancer Mother     Breast Cancer Sister     Breast Cancer Maternal Aunt     Breast Cancer Sister       SOCIAL HISTORY:   Social History     Socioeconomic History    Marital status:    Tobacco Use    Smoking status: Former     Packs/day: 1.00     Years: 50.00     Additional pack years: 0.00     Total pack years: 50.00     Types: Cigarettes     Quit date: 2014     Years since quittin.9    Smokeless tobacco: Never   Substance and Sexual Activity    Alcohol use: No    Drug use: No   Social History Narrative    Lives alone in the house, relay in TV dinner  [place] help with house maintenance  Daughter lives close by.  Mara Murillo MD 2018 1:49 PM          VITALS  Patient Vitals for the past 24 hrs:   BP Temp Temp src Pulse Resp SpO2 Height Weight   10/16/23 1440 131/63 -- -- 75 18 97 % -- --   10/16/23 1320 125/60 97.6  F (36.4  C) Oral 81 21 97 % 1.676 m (5' 6\") 50.3 kg (111 lb)        ================================================================    PHYSICAL EXAM     VITAL SIGNS: /63   Pulse 75   Temp 97.6  F (36.4  C) (Oral)   Resp 18   Ht 1.676 m (5' 6\")   Wt 50.3 kg (111 lb)   SpO2 97%   BMI 17.92 kg/m     Constitutional:  Awake, no acute distress   HENT:  Atraumatic, oropharynx without exudate or erythema, membranes moist  Lymph:  No adenopathy  Eyes: EOM intact, PERRL, no injection  Neck: Supple  Respiratory:  Clear to auscultation bilaterally, no wheezes or crackles   Cardiovascular:  Regular rate and rhythm, single S1 and S2   GI:  Soft, nontender, nondistended, no rebound or guarding   Musculoskeletal:  Moves all extremities, no lower extremity edema, no deformities. Abrasion to left knee.    Skin:  Warm, dry  Neurologic:  Alert and oriented x3, no focal deficits noted "       ================================================================  LAB       All pertinent labs reviewed and interpreted.   Labs Ordered and Resulted from Time of ED Arrival to Time of ED Departure   BASIC METABOLIC PANEL - Abnormal       Result Value    Sodium 144      Potassium 3.1 (*)     Chloride 106      Carbon Dioxide (CO2) 25      Anion Gap 13      Urea Nitrogen 12.9      Creatinine 0.67      GFR Estimate 89      Calcium 9.3      Glucose 94     CBC WITH PLATELETS AND DIFFERENTIAL - Abnormal    WBC Count 6.4      RBC Count 5.07      Hemoglobin 13.2      Hematocrit 44.1      MCV 87      MCH 26.0 (*)     MCHC 29.9 (*)     RDW 14.2      Platelet Count 410      % Neutrophils 61      % Lymphocytes 27      % Monocytes 10      Mids % (Monos, Eos, Basos)        % Eosinophils 1      % Basophils 1      % Immature Granulocytes 0      NRBCs per 100 WBC 0      Absolute Neutrophils 3.9      Absolute Lymphocytes 1.7      Absolute Monocytes 0.7      Mids Abs (Monos, Eos, Basos)        Absolute Eosinophils 0.1      Absolute Basophils 0.0      Absolute Immature Granulocytes 0.0      Absolute NRBCs 0.0     MAGNESIUM - Normal    Magnesium 1.9     ENTERIC BACTERIA AND VIRUS PANEL BY PCR   C. DIFFICILE TOXIN B PCR WITH REFLEX TO C. DIFFICILE ANTIGEN AND TOXINS A/B EIA        ===============================================================  RADIOLOGY       Reviewed all pertinent imaging. Please see official radiology report.   No orders to display         ================================================================  EKG         I have independently reviewed and interpreted the EKG(s) documented above.     ================================================================  PROCEDURES         I, Rosa Adam, am serving as a scribe to document services personally performed by Dr. Rainey based on my observation and the provider's statements to me. I, Donna Rainey MD attest that Rosa Adam is acting in a scribe capacity, has  observed my performance of the services and has documented them in accordance with my direction.   Donna Rainey M.D.   Emergency Medicine   [facility] [place], [place] EMERGENCY DEPARTMENT  [address] [place] [address] [id number] [phone]  Dept: [phone]      Donna Rainey MD  10/16/23 5086

## 2023-10-16 NOTE — DISCHARGE INSTRUCTIONS
Stool testing is pending.  At this time all of your blood work looks reasonable.  It is safe to go home pending these results.  Continue to drink fluids to try and stay hydrated.

## 2023-10-16 NOTE — ED TRIAGE NOTES
"Diarrhea for one week. Denies n/v, abd pain. Fall today, slid out of wc around 1000. Did not hit head, no LOC. Not on thinners. No HA, CP. \"Dizzy one in a while.\" No dizziness now.         "

## 2023-10-18 ENCOUNTER — PATIENT OUTREACH (OUTPATIENT)
Dept: CARE COORDINATION | Facility: CLINIC | Age: 78
End: 2023-10-18
Payer: COMMERCIAL

## 2023-10-18 NOTE — PROGRESS NOTES
[facility] Resource Center Contact  [facility]/Voicemail     Clinical Data: Care Coordination ED-sourced Outreach-     Outreach attempted x 2.  Left message on patient's voicemail, providing [facility]'s 24/7 scheduling and nurse triage phone number [phone] ([phone]) for questions/concerns and/or to schedule an appt with an [facility] provider.      Care Coordination introduction letter with explanation of Clinic Care Coordination services sent to patient via [employer]. Clinic Care Coordination services remain available via referral if needed.    Plan: [facility] will do no further outreaches at this time.       ANALISA Tena  Connected Care Resource [place], [facility]    *Connected Care Resource Team does NOT follow patient ongoing. Referrals are identified based on internal discharge reports and the outreach is to ensure patient has an understanding of their discharge instructions.

## 2023-10-18 NOTE — LETTER
Julisa Chaney  [address] [place] [address]    Dear Julisa Chaney,      I am a team member within the Connected Care Resource Center with [facility]. I recently tried to reach you to ensure you were doing well following a recent visit within our health system. I also wanted to take this chance to introduce Clinic Care Coordination.     Below is a description of Clinic Care Coordination and how this team can further assist you:       The Clinic Care Coordination team is made up of a Registered Nurse, , Financial Resource Worker, and a Community Health Worker who understand and can help navigate the health care system. The goal of clinic care coordination is to help you manage your health, improve access to care, and achieve optimal health outcomes. They work alongside your provider to assist you in determining your health and social needs, obtain health care and community resources, and provide you with necessary information and education. Clinic Care Coordination can work with you through any barriers and develop a care plan that helps coordinate and strengthen the relationship between you and your care team.    If you wish to connect with the Clinic Care Coordination Team, please let your [facility] Primary Care Provider or Clinic Care Team know and they can place a referral. The Clinic Care Coordination team will then reach out by phone to further support you.    We are focused on providing you with the highest-quality healthcare experience possible.    Sincerely,   Your care team with [facility]

## 2023-10-20 ENCOUNTER — TELEPHONE (OUTPATIENT)
Dept: FAMILY MEDICINE | Facility: CLINIC | Age: 78
End: 2023-10-20
Payer: COMMERCIAL

## 2023-10-20 NOTE — TELEPHONE ENCOUNTER
Reason for Call:  Home Health Care    Alida with [facility] called regarding (reason for call): verbal order    Orders are needed for this patient. Requesting verbal order to move PT visit from this week, to next week, due to patient request.    Pt Provider: Chidi    Phone Number Homecare Nurse can be reached at: [phone]    Can we leave a detailed message on this number? YES - secure VM    Best Time: Any    Call taken on 10/20/2023 at 8:26 AM by Precious Lu CMA

## 2023-10-22 ENCOUNTER — APPOINTMENT (OUTPATIENT)
Dept: CT IMAGING | Facility: HOSPITAL | Age: 78
DRG: 392 | End: 2023-10-22
Attending: INTERNAL MEDICINE
Payer: COMMERCIAL

## 2023-10-22 ENCOUNTER — APPOINTMENT (OUTPATIENT)
Dept: RADIOLOGY | Facility: HOSPITAL | Age: 78
DRG: 392 | End: 2023-10-22
Attending: INTERNAL MEDICINE
Payer: COMMERCIAL

## 2023-10-22 ENCOUNTER — HOSPITAL ENCOUNTER (INPATIENT)
Facility: HOSPITAL | Age: 78
LOS: 2 days | Discharge: HOME HEALTH | DRG: 392 | End: 2023-10-24
Attending: EMERGENCY MEDICINE | Admitting: INTERNAL MEDICINE
Payer: COMMERCIAL

## 2023-10-22 DIAGNOSIS — R19.7 DIARRHEA, UNSPECIFIED TYPE: ICD-10-CM

## 2023-10-22 DIAGNOSIS — E87.6 HYPOKALEMIA: ICD-10-CM

## 2023-10-22 DIAGNOSIS — E86.0 DEHYDRATION: ICD-10-CM

## 2023-10-22 LAB
ALBUMIN SERPL BCG-MCNC: 3.6 G/DL (ref 3.5–5.2)
ALP SERPL-CCNC: 153 U/L (ref 35–104)
ALT SERPL W P-5'-P-CCNC: 15 U/L (ref 0–50)
ANION GAP SERPL CALCULATED.3IONS-SCNC: 21 MMOL/L (ref 7–15)
AST SERPL W P-5'-P-CCNC: 16 U/L (ref 0–45)
BILIRUB DIRECT SERPL-MCNC: <0.2 MG/DL (ref 0–0.3)
BILIRUB SERPL-MCNC: 0.2 MG/DL
BUN SERPL-MCNC: 6.8 MG/DL (ref 8–23)
CALCIUM SERPL-MCNC: 10 MG/DL (ref 8.8–10.2)
CHLORIDE SERPL-SCNC: 101 MMOL/L (ref 98–107)
CREAT SERPL-MCNC: 0.65 MG/DL (ref 0.51–0.95)
CREAT SERPL-MCNC: 0.66 MG/DL (ref 0.51–0.95)
DEPRECATED HCO3 PLAS-SCNC: 20 MMOL/L (ref 22–29)
EGFRCR SERPLBLD CKD-EPI 2021: 89 ML/MIN/1.73M2
EGFRCR SERPLBLD CKD-EPI 2021: 90 ML/MIN/1.73M2
ERYTHROCYTE [DISTWIDTH] IN BLOOD BY AUTOMATED COUNT: 14.6 % (ref 10–15)
GLUCOSE SERPL-MCNC: 82 MG/DL (ref 70–99)
HCT VFR BLD AUTO: 51.6 % (ref 35–47)
HGB BLD-MCNC: 15.6 G/DL (ref 11.7–15.7)
LIPASE SERPL-CCNC: 6 U/L (ref 13–60)
MAGNESIUM SERPL-MCNC: 2 MG/DL (ref 1.7–2.3)
MCH RBC QN AUTO: 26.3 PG (ref 26.5–33)
MCHC RBC AUTO-ENTMCNC: 30.2 G/DL (ref 31.5–36.5)
MCV RBC AUTO: 87 FL (ref 78–100)
PLATELET # BLD AUTO: 426 10E3/UL (ref 150–450)
POTASSIUM SERPL-SCNC: 2.6 MMOL/L (ref 3.4–5.3)
POTASSIUM SERPL-SCNC: 2.6 MMOL/L (ref 3.4–5.3)
PROT SERPL-MCNC: 7.6 G/DL (ref 6.4–8.3)
RBC # BLD AUTO: 5.94 10E6/UL (ref 3.8–5.2)
SARS-COV-2 RNA RESP QL NAA+PROBE: NEGATIVE
SODIUM SERPL-SCNC: 142 MMOL/L (ref 135–145)
TSH SERPL DL<=0.005 MIU/L-ACNC: 0.85 UIU/ML (ref 0.3–4.2)
WBC # BLD AUTO: 7.6 10E3/UL (ref 4–11)

## 2023-10-22 PROCEDURE — 84132 ASSAY OF SERUM POTASSIUM: CPT | Performed by: INTERNAL MEDICINE

## 2023-10-22 PROCEDURE — 84443 ASSAY THYROID STIM HORMONE: CPT | Performed by: EMERGENCY MEDICINE

## 2023-10-22 PROCEDURE — 85027 COMPLETE CBC AUTOMATED: CPT | Performed by: EMERGENCY MEDICINE

## 2023-10-22 PROCEDURE — 73562 X-RAY EXAM OF KNEE 3: CPT | Mod: 50

## 2023-10-22 PROCEDURE — 36415 COLL VENOUS BLD VENIPUNCTURE: CPT

## 2023-10-22 PROCEDURE — 82310 ASSAY OF CALCIUM: CPT | Performed by: EMERGENCY MEDICINE

## 2023-10-22 PROCEDURE — 83690 ASSAY OF LIPASE: CPT | Performed by: EMERGENCY MEDICINE

## 2023-10-22 PROCEDURE — 87635 SARS-COV-2 COVID-19 AMP PRB: CPT | Performed by: INTERNAL MEDICINE

## 2023-10-22 PROCEDURE — 250N000013 HC RX MED GY IP 250 OP 250 PS 637: Performed by: INTERNAL MEDICINE

## 2023-10-22 PROCEDURE — 87493 C DIFF AMPLIFIED PROBE: CPT | Performed by: EMERGENCY MEDICINE

## 2023-10-22 PROCEDURE — 36415 COLL VENOUS BLD VENIPUNCTURE: CPT | Performed by: INTERNAL MEDICINE

## 2023-10-22 PROCEDURE — 258N000003 HC RX IP 258 OP 636: Performed by: EMERGENCY MEDICINE

## 2023-10-22 PROCEDURE — 70450 CT HEAD/BRAIN W/O DYE: CPT

## 2023-10-22 PROCEDURE — 250N000013 HC RX MED GY IP 250 OP 250 PS 637: Performed by: EMERGENCY MEDICINE

## 2023-10-22 PROCEDURE — 83735 ASSAY OF MAGNESIUM: CPT | Performed by: EMERGENCY MEDICINE

## 2023-10-22 PROCEDURE — 96365 THER/PROPH/DIAG IV INF INIT: CPT

## 2023-10-22 PROCEDURE — 120N000001 HC R&B MED SURG/OB

## 2023-10-22 PROCEDURE — 82565 ASSAY OF CREATININE: CPT | Performed by: INTERNAL MEDICINE

## 2023-10-22 PROCEDURE — 250N000011 HC RX IP 250 OP 636: Mod: JZ | Performed by: INTERNAL MEDICINE

## 2023-10-22 PROCEDURE — 99222 1ST HOSP IP/OBS MODERATE 55: CPT | Performed by: INTERNAL MEDICINE

## 2023-10-22 PROCEDURE — 99285 EMERGENCY DEPT VISIT HI MDM: CPT | Mod: 25

## 2023-10-22 PROCEDURE — 250N000011 HC RX IP 250 OP 636: Mod: JZ | Performed by: EMERGENCY MEDICINE

## 2023-10-22 PROCEDURE — 36415 COLL VENOUS BLD VENIPUNCTURE: CPT | Performed by: EMERGENCY MEDICINE

## 2023-10-22 PROCEDURE — 93005 ELECTROCARDIOGRAM TRACING: CPT | Performed by: EMERGENCY MEDICINE

## 2023-10-22 PROCEDURE — 96361 HYDRATE IV INFUSION ADD-ON: CPT

## 2023-10-22 PROCEDURE — 72125 CT NECK SPINE W/O DYE: CPT

## 2023-10-22 PROCEDURE — 82248 BILIRUBIN DIRECT: CPT | Performed by: EMERGENCY MEDICINE

## 2023-10-22 RX ORDER — TOPIRAMATE 25 MG/1
50 TABLET, FILM COATED ORAL AT BEDTIME
Status: DISCONTINUED | OUTPATIENT
Start: 2023-10-22 | End: 2023-10-24 | Stop reason: HOSPADM

## 2023-10-22 RX ORDER — NALOXONE HYDROCHLORIDE 0.4 MG/ML
0.2 INJECTION, SOLUTION INTRAMUSCULAR; INTRAVENOUS; SUBCUTANEOUS
Status: DISCONTINUED | OUTPATIENT
Start: 2023-10-22 | End: 2023-10-24 | Stop reason: HOSPADM

## 2023-10-22 RX ORDER — NALOXONE HYDROCHLORIDE 0.4 MG/ML
0.4 INJECTION, SOLUTION INTRAMUSCULAR; INTRAVENOUS; SUBCUTANEOUS
Status: DISCONTINUED | OUTPATIENT
Start: 2023-10-22 | End: 2023-10-24 | Stop reason: HOSPADM

## 2023-10-22 RX ORDER — GABAPENTIN 100 MG/1
100 CAPSULE ORAL
Status: DISCONTINUED | OUTPATIENT
Start: 2023-10-23 | End: 2023-10-24

## 2023-10-22 RX ORDER — CALCIUM CARBONATE 500 MG/1
1000 TABLET, CHEWABLE ORAL 4 TIMES DAILY PRN
Status: DISCONTINUED | OUTPATIENT
Start: 2023-10-22 | End: 2023-10-24 | Stop reason: HOSPADM

## 2023-10-22 RX ORDER — POTASSIUM CHLORIDE 7.45 MG/ML
10 INJECTION INTRAVENOUS ONCE
Status: COMPLETED | OUTPATIENT
Start: 2023-10-22 | End: 2023-10-22

## 2023-10-22 RX ORDER — MECLIZINE HCL 12.5 MG 12.5 MG/1
12.5 TABLET ORAL 2 TIMES DAILY
Status: DISCONTINUED | OUTPATIENT
Start: 2023-10-22 | End: 2023-10-24 | Stop reason: HOSPADM

## 2023-10-22 RX ORDER — ACETAMINOPHEN 650 MG/1
650 SUPPOSITORY RECTAL EVERY 6 HOURS PRN
Status: DISCONTINUED | OUTPATIENT
Start: 2023-10-22 | End: 2023-10-23

## 2023-10-22 RX ORDER — ONDANSETRON 2 MG/ML
4 INJECTION INTRAMUSCULAR; INTRAVENOUS EVERY 6 HOURS PRN
Status: DISCONTINUED | OUTPATIENT
Start: 2023-10-22 | End: 2023-10-24 | Stop reason: HOSPADM

## 2023-10-22 RX ORDER — QUETIAPINE FUMARATE 25 MG/1
50 TABLET, FILM COATED ORAL AT BEDTIME
Status: DISCONTINUED | OUTPATIENT
Start: 2023-10-22 | End: 2023-10-24 | Stop reason: HOSPADM

## 2023-10-22 RX ORDER — FERROUS SULFATE 325(65) MG
325 TABLET ORAL
Status: DISCONTINUED | OUTPATIENT
Start: 2023-10-23 | End: 2023-10-24 | Stop reason: HOSPADM

## 2023-10-22 RX ORDER — POTASSIUM CHLORIDE 1.5 G/1.58G
40 POWDER, FOR SOLUTION ORAL ONCE
Status: COMPLETED | OUTPATIENT
Start: 2023-10-22 | End: 2023-10-22

## 2023-10-22 RX ORDER — VITAMIN B COMPLEX
50 TABLET ORAL DAILY
Status: DISCONTINUED | OUTPATIENT
Start: 2023-10-23 | End: 2023-10-24 | Stop reason: HOSPADM

## 2023-10-22 RX ORDER — POTASSIUM CHLORIDE 1500 MG/1
20 TABLET, EXTENDED RELEASE ORAL ONCE
Status: COMPLETED | OUTPATIENT
Start: 2023-10-22 | End: 2023-10-22

## 2023-10-22 RX ORDER — ACETAMINOPHEN AND CODEINE PHOSPHATE 300; 30 MG/1; MG/1
1 TABLET ORAL EVERY 6 HOURS PRN
Status: DISCONTINUED | OUTPATIENT
Start: 2023-10-22 | End: 2023-10-23

## 2023-10-22 RX ORDER — MULTIVITAMIN,THERAPEUTIC
1 TABLET ORAL DAILY
Status: DISCONTINUED | OUTPATIENT
Start: 2023-10-23 | End: 2023-10-24 | Stop reason: HOSPADM

## 2023-10-22 RX ORDER — SODIUM CHLORIDE, SODIUM LACTATE, POTASSIUM CHLORIDE, CALCIUM CHLORIDE 600; 310; 30; 20 MG/100ML; MG/100ML; MG/100ML; MG/100ML
INJECTION, SOLUTION INTRAVENOUS CONTINUOUS
Status: DISCONTINUED | OUTPATIENT
Start: 2023-10-22 | End: 2023-10-24

## 2023-10-22 RX ORDER — GABAPENTIN 300 MG/1
300 CAPSULE ORAL AT BEDTIME
Status: DISCONTINUED | OUTPATIENT
Start: 2023-10-22 | End: 2023-10-24

## 2023-10-22 RX ORDER — PANTOPRAZOLE SODIUM 40 MG/1
40 TABLET, DELAYED RELEASE ORAL
Status: DISCONTINUED | OUTPATIENT
Start: 2023-10-23 | End: 2023-10-24 | Stop reason: HOSPADM

## 2023-10-22 RX ORDER — MIRTAZAPINE 15 MG/1
15 TABLET, FILM COATED ORAL AT BEDTIME
Status: DISCONTINUED | OUTPATIENT
Start: 2023-10-22 | End: 2023-10-24 | Stop reason: HOSPADM

## 2023-10-22 RX ORDER — POTASSIUM CHLORIDE 1500 MG/1
40 TABLET, EXTENDED RELEASE ORAL ONCE
Status: COMPLETED | OUTPATIENT
Start: 2023-10-22 | End: 2023-10-22

## 2023-10-22 RX ORDER — OXCARBAZEPINE 150 MG/1
450 TABLET, FILM COATED ORAL EVERY EVENING
Status: DISCONTINUED | OUTPATIENT
Start: 2023-10-22 | End: 2023-10-24 | Stop reason: HOSPADM

## 2023-10-22 RX ORDER — POLYVINYL ALCOHOL 14 MG/ML
1 SOLUTION/ DROPS OPHTHALMIC
Status: DISCONTINUED | OUTPATIENT
Start: 2023-10-22 | End: 2023-10-24 | Stop reason: HOSPADM

## 2023-10-22 RX ORDER — ENOXAPARIN SODIUM 100 MG/ML
30 INJECTION SUBCUTANEOUS EVERY 24 HOURS
Status: DISCONTINUED | OUTPATIENT
Start: 2023-10-22 | End: 2023-10-24 | Stop reason: HOSPADM

## 2023-10-22 RX ORDER — NORTRIPTYLINE HYDROCHLORIDE 50 MG/1
50 CAPSULE ORAL AT BEDTIME
Status: DISCONTINUED | OUTPATIENT
Start: 2023-10-22 | End: 2023-10-24 | Stop reason: HOSPADM

## 2023-10-22 RX ORDER — ONDANSETRON 4 MG/1
4 TABLET, ORALLY DISINTEGRATING ORAL EVERY 6 HOURS PRN
Status: DISCONTINUED | OUTPATIENT
Start: 2023-10-22 | End: 2023-10-24 | Stop reason: HOSPADM

## 2023-10-22 RX ORDER — ACETAMINOPHEN 325 MG/1
650 TABLET ORAL EVERY 6 HOURS PRN
Status: DISCONTINUED | OUTPATIENT
Start: 2023-10-22 | End: 2023-10-23

## 2023-10-22 RX ADMIN — NORTRIPTYLINE HYDROCHLORIDE 50 MG: 50 CAPSULE ORAL at 22:29

## 2023-10-22 RX ADMIN — MECLIZINE HYDROCHLORIDE 12.5 MG: 12.5 TABLET ORAL at 20:48

## 2023-10-22 RX ADMIN — ACETAMINOPHEN 650 MG: 325 TABLET ORAL at 16:43

## 2023-10-22 RX ADMIN — ENOXAPARIN SODIUM 30 MG: 30 INJECTION SUBCUTANEOUS at 13:59

## 2023-10-22 RX ADMIN — SODIUM CHLORIDE, POTASSIUM CHLORIDE, SODIUM LACTATE AND CALCIUM CHLORIDE: 600; 310; 30; 20 INJECTION, SOLUTION INTRAVENOUS at 13:59

## 2023-10-22 RX ADMIN — POTASSIUM CHLORIDE 40 MEQ: 1500 TABLET, EXTENDED RELEASE ORAL at 16:43

## 2023-10-22 RX ADMIN — SODIUM CHLORIDE 1000 ML: 9 INJECTION, SOLUTION INTRAVENOUS at 11:05

## 2023-10-22 RX ADMIN — POTASSIUM CHLORIDE 10 MEQ: 7.46 INJECTION, SOLUTION INTRAVENOUS at 12:01

## 2023-10-22 RX ADMIN — QUETIAPINE FUMARATE 50 MG: 25 TABLET ORAL at 22:29

## 2023-10-22 RX ADMIN — MIRTAZAPINE 15 MG: 15 TABLET, FILM COATED ORAL at 22:29

## 2023-10-22 RX ADMIN — Medication 2 CAPSULE: at 18:20

## 2023-10-22 RX ADMIN — ACETAMINOPHEN AND CODEINE PHOSPHATE 1 TABLET: 300; 30 TABLET ORAL at 22:29

## 2023-10-22 RX ADMIN — POTASSIUM CHLORIDE 40 MEQ: 1.5 POWDER, FOR SOLUTION ORAL at 12:01

## 2023-10-22 RX ADMIN — GABAPENTIN 300 MG: 300 CAPSULE ORAL at 22:29

## 2023-10-22 RX ADMIN — TOPIRAMATE 50 MG: 25 TABLET, FILM COATED ORAL at 22:29

## 2023-10-22 RX ADMIN — SODIUM CHLORIDE, POTASSIUM CHLORIDE, SODIUM LACTATE AND CALCIUM CHLORIDE: 600; 310; 30; 20 INJECTION, SOLUTION INTRAVENOUS at 22:32

## 2023-10-22 RX ADMIN — LEVETIRACETAM 750 MG: 500 TABLET, FILM COATED ORAL at 20:48

## 2023-10-22 RX ADMIN — OXCARBAZEPINE 450 MG: 150 TABLET, FILM COATED ORAL at 20:48

## 2023-10-22 RX ADMIN — POTASSIUM CHLORIDE 20 MEQ: 1500 TABLET, EXTENDED RELEASE ORAL at 18:19

## 2023-10-22 ASSESSMENT — ACTIVITIES OF DAILY LIVING (ADL)
ADLS_ACUITY_SCORE: 40
ADLS_ACUITY_SCORE: 37
ADLS_ACUITY_SCORE: 33
ADLS_ACUITY_SCORE: 41

## 2023-10-22 NOTE — PHARMACY-ADMISSION MEDICATION HISTORY
Pharmacist Admission Medication History    Admission medication history is complete. The information provided in this note is only as accurate as the sources available at the time of the update.    Information Source(s): Family member and CareEverywhere/[employer]Scripts via phone    Pertinent Information: APAP-codeine: pt takes about 3 times per day on average    Changes made to PTA medication list:  Added: None  Deleted: None  Changed: None    Allergies reviewed with patient and updates made in EHR: yes    Medication History Completed By: KHURRAM TORRES [facility] 10/22/2023 12:08 PM    PTA Med List   Medication Sig Last Dose    acetaminophen-codeine (TYLENOL #3) 300-30 MG per tablet Take 1 tablet by mouth every 6 hours as needed for severe pain 10/21/2023 at pm    desonide (DESOWEN) 0.05 % external cream Apply to affected area 2 times daily 10/21/2023 at pm    ferrous sulfate (FEROSUL) 325 (65 Fe) MG tablet Take 1 tablet (325 mg) by mouth daily (with breakfast) 10/21/2023 at am    gabapentin (NEURONTIN) 100 MG capsule Take 1 capsule (100 mg) by mouth daily (with breakfast) AND 3 capsules (300 mg) At Bedtime. 10/21/2023 at pm    levETIRAcetam (KEPPRA) 750 MG tablet Take 1 tablet (750 mg) by mouth 2 times daily 10/21/2023 at pm    meclizine (ANTIVERT) 12.5 MG tablet Take 1 tablet (12.5 mg) by mouth 2 times daily 10/21/2023 at pm    mirtazapine (REMERON) 15 MG tablet Take 1 tablet (15 mg) by mouth at bedtime 10/21/2023 at pm    Multiple Vitamin (MULTIVITAMIN) TABS Take 1 tablet by mouth daily 10/21/2023 at am    nortriptyline (PAMELOR) 50 MG capsule Take 1 capsule (50 mg) by mouth At Bedtime 10/21/2023 at pm    omeprazole (PRILOSEC) 40 MG DR capsule TAKE 1 CAPSULE(40 MG) BY MOUTH DAILY 10/21/2023 at am    OXcarbazepine (TRILEPTAL) 150 MG tablet TAKE 3 TABLETS(450 MG) BY MOUTH DAILY 10/21/2023 at pm    polyvinyl alcohol (ARTIFICIAL TEARS) 1.4 % ophthalmic solution Place 1 drop into both eyes every hour as needed for dry eyes  Past Month    QUEtiapine (SEROQUEL) 50 MG tablet Take 1 tablet (50 mg) by mouth At Bedtime 10/21/2023 at pm    senna-docusate (STIMULANT LAXATIVE) 8.6-50 MG tablet TAKE 1 TABLET BY MOUTH AT BEDTIME Past Month    topiramate (TOPAMAX) 50 MG tablet Take 1 tablet (50 mg) by mouth at bedtime 10/21/2023 at pm    Vitamin D3 50 mcg (2000 units) tablet Take 1 tablet (50 mcg) by mouth daily 10/21/2023 at am

## 2023-10-22 NOTE — ED TRIAGE NOTES
Patient c/o 3 weeks diarrhea, loss of appetite, not eating well and generalized weakness.  Recently had noro virus.     Triage Assessment (Adult)       Row Name 10/22/23 1038          Triage Assessment    Airway WDL WDL        Respiratory WDL    Respiratory WDL WDL        Skin Circulation/Temperature WDL    Skin Circulation/Temperature WDL WDL        Cardiac WDL    Cardiac WDL WDL        Peripheral/Neurovascular WDL    Peripheral Neurovascular WDL WDL        Cognitive/Neuro/Behavioral WDL    Cognitive/Neuro/Behavioral WDL WDL

## 2023-10-22 NOTE — PROGRESS NOTES
Pt arrived to floor before writer was able to view ED note.  Pt on precautions for possible cdiff, MRSA and Special precautions for possible COVID, which was not communicated.   COVID and cdiff needs to be collected.  COVID sample collected.     Pt verbalized to writer that she had a fall that she did not report in ED.  She fell in her bathroom 3 or 4 days ago and had abrasions bilateral knees.  She was on the floor for about 2 hours until her daughter came.  Pt would not let writer do full skin assessment stating she was too sore to turn over.  She rates generalized pain 9/10.

## 2023-10-22 NOTE — PLAN OF CARE
Problem: Adult Inpatient Plan of Care  Goal: Plan of Care Review  Description: The Plan of Care Review/Shift note should be completed every shift.  The Outcome Evaluation is a brief statement about your assessment that the patient is improving, declining, or no change.  This information will be displayed automatically on your shift  note.  Outcome: Progressing   Goal Outcome Evaluation:       New admission at shift change. VSS. GI consult completed. Started oral psyllium. IV fluids infusing. Pt is wheelchair at baseline. Using bedpan. Pt had one stool but unsuccessful at sending stool sample this shift. Pt completed CT scan.

## 2023-10-22 NOTE — CONSULTS
GI CONSULT NOTE      Name: Julisa Chaney  Medical Record #: [id number]  YOB: 1945  Date of Admission: 10/22/2023  Date/Time: 10/22/2023/3:03 PM     CHIEF COMPLAINT: diarrhea, weakness    HISTORY OF PRESENT ILLNESS: We were asked to see Julisa Chaney by MD Pandya for diarrhea.    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 difficile (8/2022) who presents with diarrhea and generalized weakness.  The patient was seen in the ER on 1016 for diarrhea at which time enteric pathogen panel returned positive for norovirus, C. difficile negative.  Labs on admission notable for hypokalemia with potassium 2.6.  Hemoglobin normal.  Repeat C. difficile test ordered but has not yet been collected.    The patient states that she has had diarrhea for somewhere between 10 days to 2 weeks.  Unsure how many bowel movements per day but states that she has urgent watery stools typically after eating or drinking large amounts of fluids.  Denies abdominal pain, nausea, vomiting, unintended weight loss.  She has not been started on any new medications.  Denies any recent travel or known sick contacts.  She has been using Imodium 4 mg twice daily which is only mildly helpful for her diarrhea.    She does have a history of iron deficiency anemia for which she underwent EGD and colonoscopy in 2020.  Colonoscopy revealed 1 polyp that was removed, pathology consistent with tubular adenoma.  EGD revealed presbyesophagus, hiatal hernia, scarring of the antrum.  She had a follow-up EGD 9/18/2022 that showed gastritis and stricture beyond the pylorus.  Biopsies consistent with reactive gastropathy, duodenal biopsies negative.  Upper GI series showed resolution of the gastroduodenal narrowing.    REVIEW OF SYSTEMS (ROS): Complete review of  systems negative other than listed in HPI.    PAST MEDICAL HISTORY:  Past Medical History:   Diagnosis Date    Aspiration pneumonia (H) 2022    Depression     High cholesterol     Migraines     Pyloric ulcer, chronic     Sepsis (H) 08/10/2020    Trigeminal neuralgia         FAMILY HISTORY:  Family History   Problem Relation Age of Onset    Cancer Father     Testicular cancer Grandchild 17.00    Breast Cancer Mother     Breast Cancer Sister     Breast Cancer Maternal Aunt     Breast Cancer Sister        SOCIAL HISTORY:  Social History     Socioeconomic History    Marital status:      Spouse name: Not on file    Number of children: Not on file    Years of education: Not on file    Highest education level: Not on file   Occupational History    Not on file   Tobacco Use    Smoking status: Former     Packs/day: 1.00     Years: 50.00     Additional pack years: 0.00     Total pack years: 50.00     Types: Cigarettes     Quit date: 2014     Years since quittin.9    Smokeless tobacco: Never   Substance and Sexual Activity    Alcohol use: No    Drug use: No    Sexual activity: Not on file   Other Topics Concern    Not on file   Social History Narrative    Lives alone in the house, relay in TV dinner  grandkids help with house maintenance  Daughter lives close by.  Mara Murillo MD 2018 1:49 PM       Social Determinants of Health     Financial Resource Strain: Not on file   Food Insecurity: Not on file   Transportation Needs: Not on file   Physical Activity: Not on file   Stress: Not on file   Social Connections: Not on file   Interpersonal Safety: Not on file   Housing Stability: Not on file       MEDICATIONS PRIOR TO ADMISSION:   Medications Prior to Admission   Medication Sig Dispense Refill Last Dose    acetaminophen-codeine (TYLENOL #3) 300-30 MG per tablet Take 1 tablet by mouth every 6 hours as needed for severe pain 120 tablet 0 10/21/2023 at pm    desonide (DESOWEN) 0.05 % external cream Apply to  "affected area 2 times daily 60 g 0 10/21/2023 at pm    ferrous sulfate (FEROSUL) 325 (65 Fe) MG tablet Take 1 tablet (325 mg) by mouth daily (with breakfast) 30 tablet 1 10/21/2023 at am    gabapentin (NEURONTIN) 100 MG capsule Take 1 capsule (100 mg) by mouth daily (with breakfast) AND 3 capsules (300 mg) At Bedtime. 120 capsule 0 10/21/2023 at pm    levETIRAcetam (KEPPRA) 750 MG tablet Take 1 tablet (750 mg) by mouth 2 times daily 60 tablet 0 10/21/2023 at pm    meclizine (ANTIVERT) 12.5 MG tablet Take 1 tablet (12.5 mg) by mouth 2 times daily 60 tablet 1 10/21/2023 at pm    mirtazapine (REMERON) 15 MG tablet Take 1 tablet (15 mg) by mouth at bedtime 30 tablet 0 10/21/2023 at pm    Multiple Vitamin (MULTIVITAMIN) TABS Take 1 tablet by mouth daily 100 tablet 3 10/21/2023 at am    nortriptyline (PAMELOR) 50 MG capsule Take 1 capsule (50 mg) by mouth At Bedtime 90 capsule 3 10/21/2023 at pm    omeprazole (PRILOSEC) 40 MG DR capsule TAKE 1 CAPSULE(40 MG) BY MOUTH DAILY 90 capsule 3 10/21/2023 at am    OXcarbazepine (TRILEPTAL) 150 MG tablet TAKE 3 TABLETS(450 MG) BY MOUTH DAILY 270 tablet 1 10/21/2023 at pm    polyvinyl alcohol (ARTIFICIAL TEARS) 1.4 % ophthalmic solution Place 1 drop into both eyes every hour as needed for dry eyes 60 mL 0 Past Month    QUEtiapine (SEROQUEL) 50 MG tablet Take 1 tablet (50 mg) by mouth At Bedtime 30 tablet 0 10/21/2023 at pm    senna-docusate (STIMULANT LAXATIVE) 8.6-50 MG tablet TAKE 1 TABLET BY MOUTH AT BEDTIME 90 tablet 3 Past Month    topiramate (TOPAMAX) 50 MG tablet Take 1 tablet (50 mg) by mouth at bedtime 30 tablet 0 10/21/2023 at pm    Vitamin D3 50 mcg (2000 units) tablet Take 1 tablet (50 mcg) by mouth daily 90 tablet 4 10/21/2023 at am          ALLERGIES: Contrast [iohexol], Chocolate, Contrast dye, and Penicillins    PHYSICAL EXAM:    /60   Pulse 88   Temp 98.8  F (37.1  C) (Oral)   Resp 16   Ht 1.676 m (5' 6\")   Wt 50.3 kg (111 lb)   SpO2 95%   BMI 17.92 " kg/m      GENERAL: Pleasant, no obvious distress  NECK: Supple without adenopathy  EYES: No scleral icterus  LUNGS: Clear to auscultation bilaterally  HEART: Regular rate and rhythm, S1 and S2 present, no lower extremity edema  ABDOMEN: Non-distended. Positive bowel sounds. Soft, non-tender, no guarding/rebound/mass, no obvious organomegaly  MUSKULOSKELETAL:  Warm and well perfused, moves all extremities well  SKIN: No jaundice  NEUROLOGIC: Alert and oriented  PSYCHIATRIC: Normal affect    LAB DATA:  CMP Results:   Recent Labs   Lab Test 10/22/23  1105 10/16/23  1437 08/24/23  1721 07/06/23  0450 07/05/23  0555    144 142   < > 142   POTASSIUM 2.6* 3.1* 3.8   < > 3.6   CHLORIDE 101 106 106   < > 107   CO2 20* 25 27   < > 26   ANIONGAP 21* 13 9   < > 9   GLC 82 94 122*   < > 89   BUN 6.8* 12.9 15.7   < > 6.1*   CR 0.66 0.67 0.77   < > 0.61   BILITOTAL 0.2  --  <0.2  --  0.2   ALKPHOS 153*  --  150*  --  115*   ALT 15  --  12  --  <5   AST 16  --  20  --  12    < > = values in this interval not displayed.      CBC  Recent Labs   Lab 10/22/23  1105 10/16/23  1437   WBC 7.6 6.4   RBC 5.94* 5.07   HGB 15.6 13.2   HCT 51.6* 44.1   MCV 87 87   MCH 26.3* 26.0*   MCHC 30.2* 29.9*   RDW 14.6 14.2    410     INRNo lab results found in last 7 days.   Lipase   Date Value Ref Range Status   10/22/2023 6 (L) 13 - 60 U/L Final   07/19/2022 <9 0 - 52 U/L Final       ASSESSMENT:  1.  Acute diarrhea.   2.  Norovirus.  3.  Iron deficiency anemia.     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 difficile (8/2022) who presents with diarrhea and generalized weakness.  Enteric pathogen panel on 10/16 positive for norovirus.  C. difficile at this time negative, repeat testing pending.  Symptoms are most consistent with an infectious gastroenteritis, norovirus  positive.  Postinfectious irritable bowel syndrome possible. She did have a colonoscopy in 2020 with 1 tubular adenoma removed, otherwise unremarkable.  Favor supportive management. Colonoscopy could be considered if diarrhea persists, to evaluate for other abnormalities such as microscopic colitis.    PLAN:  1.  Agree with repeat C. difficile testing.   2.  Supportive mgmt, IVFs.   3.  No plans for endoscopic evaluation.   4.  Hold off on Imodium, monitor stools. If ongoing diarrhea/electrolyte abnormalities ongoing, could schedule Imodium 4mg TID.      Discussed with Dr. Cowan who will also visit with the patient.     TIME SPENT: 60 min including chart review, patient interview and care coordination.                                                Jossy Beard DNP  Thank you for the opportunity to participate in the care of this patient.   Please feel free to call me with any questions or concerns.  Phone number [phone].             GI ATTENDING BRIEF ADDENDUM:  The patient was seen and examined.  Discussed with Jossy WILBURN  Agree with findings and plan as above with the following addendum.     79 yo F with persistent diarrhea secondary to + norovirus since 10/16. Prior h/o of C diff as well but recently negative on 10/16. Given hypokalemia needs aggressive IV hydration and electrolyte supplementation but no additional workup is needed (repeat c diff already in process). Recommend addition of psyllium capsules 2 caps bid (ordered) and Imodium in a few days if persistent. Role of colonoscopy limited given alternative explanation but please feel free to reconsult if persistent diarrhea in another 48-72 hours to discuss need for flex sig w/ biopsies.    25 mins were spent on providing patient care, including patient evaluation, reviewing documentation/test results, and , in addition to the time spent by the NP/PA.    Stephane Cowan MD  [facility]

## 2023-10-22 NOTE — ED PROVIDER NOTES
EMERGENCY DEPARTMENT ENCOUNTER      NAME: Julisa Chaney  AGE: 78 year old female  YOB: 1945  MRN: [id number]  EVALUATION DATE & TIME: 10/22/2023 10:41 AM    PCP: Mara Murillo    ED PROVIDER: Jose Krause M.D.      Chief Complaint   Patient presents with    Diarrhea    Generalized Weakness         FINAL IMPRESSION:  1. Diarrhea, unspecified type    2. Dehydration    3. Hypokalemia          ED COURSE & MEDICAL DECISION MAKING:    Pertinent Labs & Imaging studies reviewed. (See chart for details)  ED Course as of 10/22/23 1223   Sun Oct 22, 2023   1108 Patient is a 78-year-old woman, here with her daughter, brought in for persistent diarrhea, now with generalized weakness, fatigue, mild confusion over the past couple of days.  She is stopped eating much due to persistent diarrhea whenever she eats.  On arrival she has a blood pressure of 114/55, heart rate of 99 with normal temperature.  She is tired, weak, dehydrated appearing.  No abdominal tenderness.  She was seen here 6 days ago, diagnosed with norovirus, but symptoms have not improved since then.  Plan to check electrolytes, give IV fluids, check magnesium and TSH for any other reason for persistent diarrhea.   1145 WBC: 7.6   1145 Lipase(!): 6   1145 Hepatic function panel(!)  Normal bilirubin, hepatic panel.   1145 Magnesium: 2.0   1151 Lab called, K of 2.6. Will start IV and oral repletion   1202 EKG shows sinus rhythm with a rate of 60.  No acute ischemic ST elevation or depression.  Normal axis, normal intervals.  When compared to prior EKG on August 24, 2023, there is some T wave inversions in the anterolateral leads.  Impression: Sinus rhythm with new T wave inversions in anterolateral leads.   1216 TSH: 0.85   1222 I spoke to the hospitalist who recommended LR infusion and repeat C. difficile testing.  This is ordered.  Patient was admitted to the hospital.         Medical Decision Making    History:  Supplemental history from:  Documented in chart, if applicable and Family Member/Significant Other  External Record(s) reviewed: Documented in chart, if applicable.   Emergency department visit on 16 October was reviewed.  Work Up:  Chart documentation includes differential considered and any EKGs or imaging independently interpreted by provider, where specified.  In additional to work up documented, I considered the following work up: Documented in chart, if applicable.    External consultation:  Discussion of management with another provider: Documented in chart, if applicable    Complicating factors:  Care impacted by chronic illness: N/A  Care affected by social determinants of health: N/A    Disposition considerations: Admit.          MEDICATIONS GIVEN IN THE EMERGENCY:  Medications   potassium chloride 10 mEq in 100 mL sterile water infusion (10 mEq Intravenous $New Bag 10/22/23 1201)   lactated ringers infusion (has no administration in time range)   sodium chloride 0.9% BOLUS 1,000 mL (1,000 mLs Intravenous $New Bag 10/22/23 1105)   potassium chloride (KLOR-CON) Packet 40 mEq (40 mEq Oral $Given 10/22/23 1201)         NEW PRESCRIPTIONS STARTED AT TODAY'S ER VISIT  New Prescriptions    No medications on file          =================================================================    HPI      Julisa Chaney is a 78 year old female who presents to this ED for evaluation of persistent diarrhea.  Symptoms started about 3 weeks ago, and whenever she eats she has an urgent watery stool.  No fever or chills.  No abdominal pain.  She was seen here in the emergency department 6 days ago, was given IV fluids, and stool cultures showed norovirus.  Despite taking Imodium she has not been getting better and over the last few days she has stopped eating out of fear of persistent diarrhea.  She is still taking all of her medicines.    Daughter also notes that she has been a little bit confused over the last couple of days, memory seems to be a  "little bit off.      VITALS:  /58   Pulse 90   Temp 97.5  F (36.4  C) (Oral)   Resp 18   Ht 1.676 m (5' 6\")   Wt 50.3 kg (111 lb)   SpO2 95%   BMI 17.92 kg/m      PHYSICAL EXAM    Constitutional: Tired, fragile  HENT: Normocephalic, atraumatic, mucous membranes dry, nose normal. Neck- Supple, gross ROM intact.   Eyes: Pupils mid-range, conjunctiva without injection, no discharge.   Respiratory: Clear to auscultation bilaterally, no respiratory distress, no wheezing, speaks full sentences easily. No cough.  Cardiovascular: Normal heart rate, regular rhythm, no murmurs.  GI: Soft, no tenderness to deep palpation in all quadrants, no masses.  Musculoskeletal: Moving all 4 extremities intentionally and without pain. No obvious deformity.  Skin: Warm, dry, no rash.  Neurologic: Alert & oriented x 3, cranial nerves grossly intact.  Psychiatric: Affect normal, cooperative.     LAB:  All pertinent labs reviewed and interpreted.  Labs Ordered and Resulted from Time of ED Arrival to Time of ED Departure   CBC WITH PLATELETS - Abnormal       Result Value    WBC Count 7.6      RBC Count 5.94 (*)     Hemoglobin 15.6      Hematocrit 51.6 (*)     MCV 87      MCH 26.3 (*)     MCHC 30.2 (*)     RDW 14.6      Platelet Count 426     BASIC METABOLIC PANEL - Abnormal    Sodium 142      Potassium 2.6 (*)     Chloride 101      Carbon Dioxide (CO2) 20 (*)     Anion Gap 21 (*)     Urea Nitrogen 6.8 (*)     Creatinine 0.66      GFR Estimate 89      Calcium 10.0      Glucose 82     HEPATIC FUNCTION PANEL - Abnormal    Protein Total 7.6      Albumin 3.6      Bilirubin Total 0.2      Alkaline Phosphatase 153 (*)     AST 16      ALT 15      Bilirubin Direct <0.20     LIPASE - Abnormal    Lipase 6 (*)    MAGNESIUM - Normal    Magnesium 2.0     TSH WITH FREE T4 REFLEX - Normal    TSH 0.85     C. DIFFICILE TOXIN B PCR WITH REFLEX TO C. DIFFICILE ANTIGEN AND TOXINS A/B EIA       RADIOLOGY:  Reviewed all pertinent imaging. Please see " official radiology report.  No orders to display       EKG:    All EKG interpretations will be found in ED course above.        Jose Krause M.D.  Emergency Medicine  [facility] EMERGENCY DEPARTMENT  [address] [place] [id number]  [phone]  Dept: [phone]       Jose Krause MD  10/22/23 1220

## 2023-10-22 NOTE — H&P
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 difficile infection,depression, trigeminal neuralgia, hyperlipidemia, and iron deficiency anemia admitted on 10/22/2023 with diarrhea, poor oral intake and generalized body weakness.    Diarrhea  History of C. difficile infection  Recent norovirus infection 10/16/2023.  Unclear whether this is secondary to infectious process including recent norovirus infection versus C. difficile re-infection vs IBS     Clear liquid diet for now  Continue Ringer's lactate  Follow-up stool testing for enteric pathogens  Follow-up GI consult    Hypokalemia  Monitor potassium and replace as per protocol  Telemetry for now      Left lateral ventricular/frontal lobe abscess status post left frontal ventriculostomy on 7/31/2022  Wheelchair-bound  Seizure disorder  Neuropathy    Resume PTA gabapentin, oxcarbazepine, topiramate and Keppra  PT/OT  Depression  Mood disorder  Resume PTA mirtazapine and nortriptyline  Resume PTA Seroquel      Diet: Clear Liquid Diet  DVT Prophylaxis: Enoxaparin (Lovenox) SQ  Abrams Catheter: Not present  Lines: None     Cardiac Monitoring: ACTIVE order. Indication: Electrolyte Imbalance (24 hours)- Magnesium <1.3 mg/ml; Potassium < =2.8 or > 5.5 mg/ml  Code Status: Full Code    Clinically Significant Risk Factors Present on Admission        # Hypokalemia: Lowest K = 2.6 mmol/L in last 2 days, will replace as needed      # Anion Gap Metabolic Acidosis: Highest Anion Gap = 21 mmol/L in last 2 days, will monitor and treat as appropriate           # Cachexia: Estimated body mass index is 17.92 kg/m  as calculated from the following:    Height as of this encounter: 1.676  "m (5' 6\").    Weight as of this encounter: 50.3 kg (111 lb).              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 difficile infection,depression, trigeminal neuralgia, hyperlipidemia, and iron deficiency anemia who presents to the ER with diarrhea, poor oral intake and generalized body weakness    She was in her usual state of health until few days ago when she developed diarrhea associated with poor oral intake generalized body weakness.  She states she develops abdominal cramps and diarrhea whenever she eats.  She was evaluated in the ER on 10/16/2023 for diarrhea and episode of fall.  Stool testing for enteric pathogens done at that time was positive for norovirus.  There was no history of recent antibiotic use or abdominal pain.  There was no history of melena, hematochezia or hematemesis.  She denies fever, chills, nausea or vomiting.  No change in dietary habits or sick contact.    He became wheelchair-bound about a year ago after undergoing left frontal ventriculostomy for frontal lobe/left lateral ventricular abscess.  She lives alone, however, daughter visited several times a day to assist with ADLs.  She was hospitalized at [facility] in June 2023 for right lower lobe pneumonia.    She appeared dehydrated in the ER.  Initial blood pressure was 117/58, pulse 90, respiratory rate " 18, temperature 97.5  F and oxygen saturation of 95% on room air.  Notable laboratory findings include potassium 2.6, bicarbonate 20, anion gap 21, , glucose 82, hemoglobin 15.6, and hematocrit 51.6.       She received potassium chloride 10 mEq IV and 40 mEq p.o. as well as 1 L of normal saline in the ER.        Past Medical History    Past Medical History:   Diagnosis Date    Aspiration pneumonia (H) 02/2022    Depression     High cholesterol     Migraines     Pyloric ulcer, chronic     Sepsis (H) 08/10/2020    Trigeminal neuralgia        Past Surgical History   Past Surgical History:   Procedure Laterality Date    HYSTERECTOMY      IR GASTROSTOMY TUBE PERCUTANEOUS PLCMNT  8/16/2022    PICC TRIPLE LUMEN PLACEMENT  7/22/2022         ID ESOPHAGOGASTRODUODENOSCOPY TRANSORAL DIAGNOSTIC N/A 8/13/2020    Procedure: ESOPHAGOGASTRODUODENOSCOPY (EGD);  Surgeon: Nathan Smalls MD;  Location: [facility];  Service: Gastroenterology    ID ESOPHAGOGASTRODUODENOSCOPY TRANSORAL DIAGNOSTIC N/A 8/14/2020    Procedure: ESOPHAGOGASTRODUODENOSCOPY (EGD), PYLORIC DILATION;  Surgeon: Nathan Smalls MD;  Location: [facility];  Service: Gastroenterology    VENTRICULOSTOMY Left 7/31/2022    Procedure: LEFT FRONTAL VENTRICULOSTOMY;  Surgeon: Brady Heredia MD;  Location: [facility] COLONOSCOPY W/WO BRUSH/WASH N/A 8/13/2020    Procedure: COLONOSCOPY WITH POLYPECTOMY;  Surgeon: Nathan Smalls MD;  Location: [facility];  Service: Gastroenterology       Prior to Admission Medications   Prior to Admission Medications   Prescriptions Last Dose Informant Patient Reported? Taking?   Multiple Vitamin (MULTIVITAMIN) TABS 10/21/2023 at am Self No Yes   Sig: Take 1 tablet by mouth daily   OXcarbazepine (TRILEPTAL) 150 MG tablet 10/21/2023 at pm  No Yes   Sig: TAKE 3 TABLETS(450 MG) BY MOUTH DAILY   QUEtiapine (SEROQUEL) 50 MG tablet 10/21/2023 at pm  No Yes   Sig: Take 1 tablet (50 mg) by mouth At Bedtime    Vitamin D3 50 mcg (2000 units) tablet 10/21/2023 at am Self No Yes   Sig: Take 1 tablet (50 mcg) by mouth daily   acetaminophen-codeine (TYLENOL #3) 300-30 MG per tablet 10/21/2023 at pm  No Yes   Sig: Take 1 tablet by mouth every 6 hours as needed for severe pain   desonide (DESOWEN) 0.05 % external cream 10/21/2023 at pm  No Yes   Sig: Apply to affected area 2 times daily   ferrous sulfate (FEROSUL) 325 (65 Fe) MG tablet 10/21/2023 at am  No Yes   Sig: Take 1 tablet (325 mg) by mouth daily (with breakfast)   gabapentin (NEURONTIN) 100 MG capsule 10/21/2023 at pm  No Yes   Sig: Take 1 capsule (100 mg) by mouth daily (with breakfast) AND 3 capsules (300 mg) At Bedtime.   levETIRAcetam (KEPPRA) 750 MG tablet 10/21/2023 at pm  No Yes   Sig: Take 1 tablet (750 mg) by mouth 2 times daily   meclizine (ANTIVERT) 12.5 MG tablet 10/21/2023 at pm  No Yes   Sig: Take 1 tablet (12.5 mg) by mouth 2 times daily   mirtazapine (REMERON) 15 MG tablet 10/21/2023 at pm  No Yes   Sig: Take 1 tablet (15 mg) by mouth at bedtime   nortriptyline (PAMELOR) 50 MG capsule 10/21/2023 at pm  No Yes   Sig: Take 1 capsule (50 mg) by mouth At Bedtime   omeprazole (PRILOSEC) 40 MG DR capsule 10/21/2023 at am  No Yes   Sig: TAKE 1 CAPSULE(40 MG) BY MOUTH DAILY   polyvinyl alcohol (ARTIFICIAL TEARS) 1.4 % ophthalmic solution Past Month Self No Yes   Sig: Place 1 drop into both eyes every hour as needed for dry eyes   senna-docusate (STIMULANT LAXATIVE) 8.6-50 MG tablet Past Month Self No Yes   Sig: TAKE 1 TABLET BY MOUTH AT BEDTIME   topiramate (TOPAMAX) 50 MG tablet 10/21/2023 at pm  No Yes   Sig: Take 1 tablet (50 mg) by mouth at bedtime      Facility-Administered Medications: None        Review of Systems    The 10 point Review of Systems is negative other than noted in the HPI or here.     Physical Exam   Vital Signs: Temp: 97.5  F (36.4  C) Temp src: Oral BP: 119/57 Pulse: 88   Resp: 18 SpO2: 97 % O2 Device: None (Room air)    Weight: 111 lbs  0 oz    General appearance: Frail elderly woman, dehydrated, awake, Alert, Cooperative, not in any obvious distress and appears stated age   HEENT: Normocephalic, atraumatic, conjunctiva clear without icterus and ears without discharge  Lungs: Diminished entry bilaterally.  Cardiovascular: Regular Rate and Rythm, normal apical impulse, normal S1 and S2, no lower extremity edema bilaterally  Abdomen: Soft, non-tender and Non-distended, active bowel sounds  Skin: Skin color, texture normal and bruising or bleeding. No rashes or lesions over face, neck, arms and legs, turgor normal.  Musculoskeletal: Severe cervical kyphosis  Neurologic: Alert & Oriented X 3, Facial symmetry preserved and upper & lower extremities moving well with symmetry.  Left-sided ptosis.  Psychiatric: Calm, normal eye contact and normal affect      Medical Decision Making       60 MINUTES SPENT BY ME on the date of service doing chart review, history, exam, documentation & further activities per the note.      Data     I have personally reviewed the following data over the past 24 hrs:    7.6  \   15.6   / 426     142 101 6.8 (L) /  82   2.6 (LL) 20 (L) 0.66 \     ALT: 15 AST: 16 AP: 153 (H) TBILI: 0.2   ALB: 3.6 TOT PROTEIN: 7.6 LIPASE: 6 (L)     TSH: 0.85 T4: N/A A1C: N/A       Imaging results reviewed over the past 24 hrs:   No results found for this or any previous visit (from the past 24 hour(s)).

## 2023-10-22 NOTE — ED NOTES
"[facility] ED Handoff Report    ED Chief Complaint: weakness and diarrhea     ED Diagnosis:  (R19.7) Diarrhea, unspecified type  Comment: x2 days   Plan: stool sample needed. Pt has not stooled in ER     (E86.0) Dehydration  Comment: rehydrate   Plan: 1L bolus and LR@ 100    (E87.6) Hypokalemia  Comment: replace   Plan: replaced both oral and IV       PMH:    Past Medical History:   Diagnosis Date    Aspiration pneumonia (H) 02/2022    Depression     High cholesterol     Migraines     Pyloric ulcer, chronic     Sepsis (H) 08/10/2020    Trigeminal neuralgia         Code Status:  Prior     Falls Risk: Yes Band: Applied    Current Living Situation/Residence: lives in a house, pt has daughter and granddaughters to rely on for meds and daily helpfulness if needed     Elimination Status: Continent: Yes and pt does have some incontinence      Activity Level: 2 assist    Patients Preferred Language:  English     Needed: No    Vital Signs:  /57   Pulse 88   Temp 97.5  F (36.4  C) (Oral)   Resp 18   Ht 1.676 m (5' 6\")   Wt 50.3 kg (111 lb)   SpO2 97%   BMI 17.92 kg/m       Cardiac Rhythm: NSR tachycardic at 92    Pain Score: 3/10    Is the Patient Confused:  No    Last Food or Drink: 10/21/23 at 1800     Focused Assessment:  weakness attributed to x2 days of diarrhea and dehydration. Pt has recent dx of Norovirus. Pt feeling better, now increased diarrhea. Worried about labs and \"getting around\"  K+ was 2.6, replaced orally and IV. Slowed K+ as pt was unable to tolerate it at 100ml in the hand.    Tests Performed: Done: Labs    Treatments Provided:  fluids and K+    Family Dynamics/Concerns: No    Family Updated On Visitor Policy: Yes    Plan of Care Communicated to Family: Yes    Who Was Updated about Plan of Care: daughter     Belongings Checklist Done and Signed by Patient: Yes    Medications sent with patient: none    Covid: asymptomatic , negative    Additional Information: none    RN: " ADRIANNE NAYAK RN   10/22/2023 12:52 PM

## 2023-10-23 ENCOUNTER — APPOINTMENT (OUTPATIENT)
Dept: PHYSICAL THERAPY | Facility: HOSPITAL | Age: 78
DRG: 392 | End: 2023-10-23
Attending: INTERNAL MEDICINE
Payer: COMMERCIAL

## 2023-10-23 LAB
C DIFF TOX B STL QL: NEGATIVE
POTASSIUM SERPL-SCNC: 3 MMOL/L (ref 3.4–5.3)
POTASSIUM SERPL-SCNC: 4.4 MMOL/L (ref 3.4–5.3)

## 2023-10-23 PROCEDURE — 36415 COLL VENOUS BLD VENIPUNCTURE: CPT | Performed by: FAMILY MEDICINE

## 2023-10-23 PROCEDURE — 250N000013 HC RX MED GY IP 250 OP 250 PS 637: Performed by: INTERNAL MEDICINE

## 2023-10-23 PROCEDURE — 250N000011 HC RX IP 250 OP 636: Mod: JZ | Performed by: INTERNAL MEDICINE

## 2023-10-23 PROCEDURE — 84132 ASSAY OF SERUM POTASSIUM: CPT | Performed by: FAMILY MEDICINE

## 2023-10-23 PROCEDURE — 250N000013 HC RX MED GY IP 250 OP 250 PS 637: Performed by: FAMILY MEDICINE

## 2023-10-23 PROCEDURE — 999N000147 HC STATISTIC PT IP EVAL DEFER

## 2023-10-23 PROCEDURE — 258N000003 HC RX IP 258 OP 636: Performed by: EMERGENCY MEDICINE

## 2023-10-23 PROCEDURE — 120N000001 HC R&B MED SURG/OB

## 2023-10-23 PROCEDURE — 99232 SBSQ HOSP IP/OBS MODERATE 35: CPT | Performed by: FAMILY MEDICINE

## 2023-10-23 RX ORDER — POTASSIUM CHLORIDE 1.5 G/1.58G
20 POWDER, FOR SOLUTION ORAL ONCE
Status: COMPLETED | OUTPATIENT
Start: 2023-10-23 | End: 2023-10-23

## 2023-10-23 RX ORDER — POTASSIUM CHLORIDE 1.5 G/1.58G
40 POWDER, FOR SOLUTION ORAL ONCE
Status: COMPLETED | OUTPATIENT
Start: 2023-10-23 | End: 2023-10-23

## 2023-10-23 RX ORDER — ACETAMINOPHEN 325 MG/1
975 TABLET ORAL 3 TIMES DAILY
Status: DISCONTINUED | OUTPATIENT
Start: 2023-10-23 | End: 2023-10-24

## 2023-10-23 RX ORDER — OXYCODONE HYDROCHLORIDE 5 MG/1
5 TABLET ORAL EVERY 4 HOURS PRN
Status: DISCONTINUED | OUTPATIENT
Start: 2023-10-23 | End: 2023-10-24

## 2023-10-23 RX ADMIN — Medication 2 CAPSULE: at 13:00

## 2023-10-23 RX ADMIN — FERROUS SULFATE TAB 325 MG (65 MG ELEMENTAL FE) 325 MG: 325 (65 FE) TAB at 08:35

## 2023-10-23 RX ADMIN — POTASSIUM CHLORIDE 40 MEQ: 1.5 POWDER, FOR SOLUTION ORAL at 01:18

## 2023-10-23 RX ADMIN — GABAPENTIN 100 MG: 100 CAPSULE ORAL at 08:37

## 2023-10-23 RX ADMIN — Medication 50 MCG: at 08:38

## 2023-10-23 RX ADMIN — QUETIAPINE FUMARATE 50 MG: 25 TABLET ORAL at 21:30

## 2023-10-23 RX ADMIN — NORTRIPTYLINE HYDROCHLORIDE 50 MG: 50 CAPSULE ORAL at 21:32

## 2023-10-23 RX ADMIN — ACETAMINOPHEN 650 MG: 325 TABLET ORAL at 13:01

## 2023-10-23 RX ADMIN — TOPIRAMATE 50 MG: 25 TABLET, FILM COATED ORAL at 21:31

## 2023-10-23 RX ADMIN — Medication 2 CAPSULE: at 08:39

## 2023-10-23 RX ADMIN — MECLIZINE HYDROCHLORIDE 12.5 MG: 12.5 TABLET ORAL at 08:39

## 2023-10-23 RX ADMIN — OXYCODONE HYDROCHLORIDE 5 MG: 5 TABLET ORAL at 20:10

## 2023-10-23 RX ADMIN — SODIUM CHLORIDE, POTASSIUM CHLORIDE, SODIUM LACTATE AND CALCIUM CHLORIDE: 600; 310; 30; 20 INJECTION, SOLUTION INTRAVENOUS at 09:00

## 2023-10-23 RX ADMIN — THERA TABS 1 TABLET: TAB at 08:37

## 2023-10-23 RX ADMIN — MIRTAZAPINE 15 MG: 15 TABLET, FILM COATED ORAL at 21:32

## 2023-10-23 RX ADMIN — LEVETIRACETAM 750 MG: 500 TABLET, FILM COATED ORAL at 08:38

## 2023-10-23 RX ADMIN — MECLIZINE HYDROCHLORIDE 12.5 MG: 12.5 TABLET ORAL at 20:11

## 2023-10-23 RX ADMIN — ACETAMINOPHEN AND CODEINE PHOSPHATE 1 TABLET: 300; 30 TABLET ORAL at 08:35

## 2023-10-23 RX ADMIN — GABAPENTIN 300 MG: 300 CAPSULE ORAL at 21:31

## 2023-10-23 RX ADMIN — LEVETIRACETAM 750 MG: 500 TABLET, FILM COATED ORAL at 20:11

## 2023-10-23 RX ADMIN — PANTOPRAZOLE SODIUM 40 MG: 40 TABLET, DELAYED RELEASE ORAL at 08:37

## 2023-10-23 RX ADMIN — POTASSIUM CHLORIDE 20 MEQ: 1.5 POWDER, FOR SOLUTION ORAL at 03:29

## 2023-10-23 RX ADMIN — SODIUM CHLORIDE, POTASSIUM CHLORIDE, SODIUM LACTATE AND CALCIUM CHLORIDE: 600; 310; 30; 20 INJECTION, SOLUTION INTRAVENOUS at 19:29

## 2023-10-23 RX ADMIN — OXCARBAZEPINE 450 MG: 150 TABLET, FILM COATED ORAL at 20:11

## 2023-10-23 RX ADMIN — ENOXAPARIN SODIUM 30 MG: 30 INJECTION SUBCUTANEOUS at 13:00

## 2023-10-23 RX ADMIN — ACETAMINOPHEN 975 MG: 325 TABLET ORAL at 20:10

## 2023-10-23 ASSESSMENT — ACTIVITIES OF DAILY LIVING (ADL)
DEPENDENT_IADLS:: CLEANING;TRANSPORTATION;COOKING;LAUNDRY;SHOPPING;MEAL PREPARATION;MEDICATION MANAGEMENT
ADLS_ACUITY_SCORE: 42
ADLS_ACUITY_SCORE: 42
ADLS_ACUITY_SCORE: 40
ADLS_ACUITY_SCORE: 42
ADLS_ACUITY_SCORE: 40
ADLS_ACUITY_SCORE: 42
ADLS_ACUITY_SCORE: 42
ADLS_ACUITY_SCORE: 40
ADLS_ACUITY_SCORE: 40
ADLS_ACUITY_SCORE: 42
ADLS_ACUITY_SCORE: 40
ADLS_ACUITY_SCORE: 40

## 2023-10-23 NOTE — PLAN OF CARE
Problem: Pain Acute  Goal: Optimal Pain Control and Function  Outcome: Progressing  Intervention: Prevent or Manage Pain  Recent Flowsheet Documentation  Taken 10/23/2023 0100 by Amita Segura RN  Medication Review/Management: medications reviewed  Taken 10/22/2023 2200 by Amita Segura RN  Medication Review/Management: medications reviewed    Pt reported generalized pain 9/10, stated Tylenol alone isn't helpful. PRN Tylenol #3 given and pt sleeping after administration. Telemetry NSR overnight. Potassium replaced per protocol. Stool sample sent, results available. VSS.    Amita Segura RN

## 2023-10-23 NOTE — PROVIDER NOTIFICATION
Notified MD at 9:54 PM regarding new orders.      Spoke with: Dr. Mcarthur    Orders were obtained.    Comments: Provider notified due to pt request Tylenol #3. Order obtained.    Amita Segura RN

## 2023-10-23 NOTE — PLAN OF CARE
"  Problem: Adult Inpatient Plan of Care  Goal: Patient-Specific Goal (Individualized)  Description: You can add care plan individualizations to a care plan. Examples of Individualization might be:  \"Parent requests to be called daily at 9am for status\", \"I have a hard time hearing out of my right ear\", or \"Do not touch me to wake me up as it startles  me\".  10/23/2023 1509 by Medina Sharpe RN  Outcome: Progressing  10/23/2023 1418 by Mednia Sharpe RN  Outcome: Progressing     Problem: Pain Acute  Goal: Optimal Pain Control and Function  10/23/2023 1509 by Medina Sharpe RN  Outcome: Progressing  10/23/2023 1418 by Medina Sharpe RN  Outcome: Progressing  Intervention: Prevent or Manage Pain  Recent Flowsheet Documentation  Taken 10/23/2023 0900 by Medina Sharpe RN  Medication Review/Management: medications reviewed  Intervention: Optimize Psychosocial Wellbeing  Recent Flowsheet Documentation  Taken 10/23/2023 0900 by Medina Sharpe RN  Supportive Measures: active listening utilized   Goal Outcome Evaluation:      Plan of Care Reviewed With: patient    Overall Patient Progress: improvingOverall Patient Progress: improving     Pt complains of headache 9/10 so tylenol3 had been given but pain did not reduced so we notify  and tab oxycodone has prescribed but pt refused to take it so we give  1 tab tylenol and now pt is on 5/10 on her pain level.      "

## 2023-10-23 NOTE — PROGRESS NOTES
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 difficile infection,depression, trigeminal neuralgia admitted on 10/22/2023 with diarrhea, poor oral intake and generalized body weakness.    Diarrhea  History of C. difficile infection  Recent norovirus infection 10/16/2023.  Appears resolved   C. difficile negative   Advance diet  Continue IV fluids another day  Appreciate GI consult.  GI added psyllium capsules twice daily and Imodium  GI holding off on colonoscopy unless has persistent diarrhea and another 48 to 72 hours.    Hypokalemia  Secondary to ongoing diarrhea   Improved on potassium protocol   Stop telemetry is stable     Fall  Reported fall in bathroom 3 to 4 days ago.  X-rays negative  CT head and cervical spine negative  PT and OT ordered    Left lateral ventricular/frontal lobe abscess status post left frontal ventriculostomy on 7/31/2022  Wheelchair-bound  Seizure disorder  Neuropathy  Continue PTA gabapentin, oxcarbazepine, topiramate and Keppra  PT/OT ordered    Depression  Mood disorder  continue PTA mirtazapine and nortriptyline  continue PTA Seroquel      Diet: Clear Liquid Diet  DVT Prophylaxis: Enoxaparin (Lovenox) SQ  Abrams Catheter: Not present  Lines: None     Cardiac Monitoring: ACTIVE order. Indication: Electrolyte Imbalance (24 hours)- Magnesium <1.3 mg/ml; Potassium < =2.8 or > 5.5 mg/ml  Code Status: Full Code          Diet: Clear Liquid Diet    DVT Prophylaxis: Enoxaparin (Lovenox) SQ  Abrams Catheter: Not present  Lines: None     Cardiac Monitoring: ACTIVE order. Indication: Electrolyte Imbalance (24 hours)- Magnesium <1.3 mg/ml; Potassium < =2.8 or > 5.5 mg/ml  Code Status: Full Code   "    Clinically Significant Risk Factors Present on Admission        # Hypokalemia: Lowest K = 2.6 mmol/L in last 2 days, will replace as needed      # Anion Gap Metabolic Acidosis: Highest Anion Gap = 21 mmol/L in last 2 days, will monitor and treat as appropriate           # Cachexia: Estimated body mass index is 17.92 kg/m  as calculated from the following:    Height as of this encounter: 1.676 m (5' 6\").    Weight as of this encounter: 50.3 kg (111 lb).              Disposition Plan     Expected Discharge Date: 10/24/2023                    Dahlia Esteves MD  Hospitalist Service  [facility]  Securely message with [employer] (more info)  Text page via [employer] Paging/Directory   ______________________________________________________________________    Interval History   Patient tells me she feels \"awful.\"  Thinks that now the diarrhea has stopped she is just beginning to feel bad because of all of the diarrhea.  No diarrhea overnight  Frustrated she could not sleep  Appetite poor, discussed increasing oral intake    Physical Exam   Vital Signs: Temp: 97  F (36.1  C) Temp src: Axillary BP: (!) 147/70 Pulse: 90   Resp: 18 SpO2: 97 % O2 Device: None (Room air)    Weight: 111 lbs 0 oz    General Appearance: Pleasant female no apparent distress, frail in appearance  Respiratory: Clear to auscultation bilaterally  Cardiovascular: The rate and rhythm without murmurs rubs or gallops  GI: Soft and nontender without hepatosplenomegaly  Skin: No open areas or significant lower extremity edema  Other: Neurologically grossly intact without focal deficits appreciated    Medical Decision Making             Data     I have personally reviewed the following data over the past 24 hrs:    N/A  \   N/A   / N/A     N/A N/A N/A /  N/A   4.4 N/A N/A \       Imaging results reviewed over the past 24 hrs:   Recent Results (from the past 24 hour(s))   CT Head w/o Contrast    Narrative    EXAM: CT HEAD W/O CONTRAST, CT " CERVICAL SPINE W/O CONTRAST  LOCATION: [facility]  DATE: 10/22/2023    INDICATION: History of fall  COMPARISON: 08/29/2022  TECHNIQUE:   1) Routine CT Head without IV contrast. Multiplanar reformats. Dose reduction techniques were used.  2) Routine CT Cervical Spine without IV contrast. Multiplanar reformats. Dose reduction techniques were used.    FINDINGS:   HEAD CT:   INTRACRANIAL CONTENTS: No intracranial hemorrhage, extraaxial collection, or mass effect.  No CT evidence of acute infarct. Chronic bilateral cerebellar infarcts. Chronic tiny left frontal infarct. Mild presumed chronic small vessel ischemic changes.   Mild generalized volume loss. No hydrocephalus.     VISUALIZED ORBITS/SINUSES/MASTOIDS: Prior bilateral cataract surgery. Visualized portions of the orbits are otherwise unremarkable. No paranasal sinus mucosal disease. No middle ear or mastoid effusion.    BONES/SOFT TISSUES: No acute abnormality.    CERVICAL SPINE CT:   VERTEBRA: Normal vertebral body heights and alignment. No fracture or posttraumatic subluxation. Absent posterior right C1 arch.    CANAL/FORAMINA: No severe spinal canal stenosis. Severe osseous foraminal stenosis on the left at C4-C5    PARASPINAL: No extraspinal abnormality. Mild COPD.      Impression    IMPRESSION:  HEAD CT:  1.  No CT evidence for acute intracranial process.  2.  Brain atrophy and presumed chronic microvascular ischemic changes as above.  3.  Chronic ischemic changes above.    CERVICAL SPINE CT:  1.  No CT evidence for acute fracture or post traumatic subluxation. Severe osseous foraminal stenosis in the left at C4-C5.   CT Cervical Spine w/o Contrast    Narrative    EXAM: CT HEAD W/O CONTRAST, CT CERVICAL SPINE W/O CONTRAST  LOCATION: [facility]  DATE: 10/22/2023    INDICATION: History of fall  COMPARISON: 08/29/2022  TECHNIQUE:   1) Routine CT Head without IV contrast. Multiplanar reformats. Dose reduction  techniques were used.  2) Routine CT Cervical Spine without IV contrast. Multiplanar reformats. Dose reduction techniques were used.    FINDINGS:   HEAD CT:   INTRACRANIAL CONTENTS: No intracranial hemorrhage, extraaxial collection, or mass effect.  No CT evidence of acute infarct. Chronic bilateral cerebellar infarcts. Chronic tiny left frontal infarct. Mild presumed chronic small vessel ischemic changes.   Mild generalized volume loss. No hydrocephalus.     VISUALIZED ORBITS/SINUSES/MASTOIDS: Prior bilateral cataract surgery. Visualized portions of the orbits are otherwise unremarkable. No paranasal sinus mucosal disease. No middle ear or mastoid effusion.    BONES/SOFT TISSUES: No acute abnormality.    CERVICAL SPINE CT:   VERTEBRA: Normal vertebral body heights and alignment. No fracture or posttraumatic subluxation. Absent posterior right C1 arch.    CANAL/FORAMINA: No severe spinal canal stenosis. Severe osseous foraminal stenosis on the left at C4-C5    PARASPINAL: No extraspinal abnormality. Mild COPD.      Impression    IMPRESSION:  HEAD CT:  1.  No CT evidence for acute intracranial process.  2.  Brain atrophy and presumed chronic microvascular ischemic changes as above.  3.  Chronic ischemic changes above.    CERVICAL SPINE CT:  1.  No CT evidence for acute fracture or post traumatic subluxation. Severe osseous foraminal stenosis in the left at C4-C5.   XR Knee Bilateral 3 Views    Narrative    EXAM: XR KNEE BILATERAL 3 VIEWS  LOCATION: [facility]  DATE: 10/22/2023    INDICATION: History of fall, bilateral knee pain.  COMPARISON: None.      Impression    IMPRESSION:   RIGHT KNEE: Osteopenia. No fracture. No effusion. No degenerative changes.    LEFT KNEE: No fracture. No effusion. Osteopenia. No degenerative changes.

## 2023-10-23 NOTE — UTILIZATION REVIEW
Admission Status; Secondary Review Determination   Under the authority of the Utilization Management Committee, the utilization review process indicated a secondary review on Julisa Chaney. The review outcome is based on review of the medical records, discussions with staff, and applying clinical experience noted on the date of the review.   (x) Inpatient Status Appropriate - This patient's medical care is consistent with medical management for inpatient care and reasonable inpatient medical practice.     RATIONALE FOR DETERMINATION   Julisa Chaney is a 78 yr old female with history of left ventricular/frontal lobe abscess status post ventriculostomy (7/2022), trigeminal neuralgia, hyperlipidemia, cognitive impairment, iron deficiency anemia, history of C. difficile (8/2022) who presents with diarrhea and generalized weakness.  The patient was seen in the ER on 1016 for diarrhea at which time enteric pathogen panel returned positive for norovirus, C. difficile negative.  Labs on admission notable for hypokalemia with potassium 2.6.  Ongoing loose stool.  GI consulting and recommend aggressive IVF and supportive care of electrolytes given hypokalemia with ongoing diarrhea and return 6 days after first ED visit with ongoing issue and new electrolyte abnormalities.  Failed trial outpatient management.  At this time, anticipated patient will cross second midnight.    At the time of admission with the information available to the attending physician more than 2 nights Hospital complex care was anticipated, based on patient risk of adverse outcome if treated as outpatient and complex care required. Inpatient admission is appropriate based on the Medicare guidelines.   The information on this document is developed by the utilization review team in order for the business office to ensure compliance. This only denotes the appropriateness of proper admission status and does not reflect the quality of care rendered.   The  definitions of Inpatient Status and Observation Status used in making the determination above are those provided in the CMS Coverage Manual, Chapter 1 and Chapter 6, section 70.4.   Sincerely,   Nadeen Jacinto MD  Utilization Review  Physician Advisor  [facility]

## 2023-10-23 NOTE — CONSULTS
Care Management Initial Consult    General Information  Assessment completed with:  ,         Primary Care Provider verified and updated as needed:     Readmission within the last 30 days:           Advance Care Planning:            Communication Assessment  Patient's communication style: spoken language (English or Bilingual)    Hearing Difficulty or Deaf: no   Wear Glasses or Blind: yes    Cognitive  Cognitive/Neuro/Behavioral: WDL                      Living Environment:   People in home: alone     Current living Arrangements:        Able to return to prior arrangements: yes       Family/Social Support:  Care provided by: self, child(geetha)  Provides care for: no one  Marital Status:   Children          Description of Support System: Supportive, Involved    Support Assessment: Adequate family and caregiver support    Current Resources:   Patient receiving home care services: Yes  Skilled Home Care Services: Skilled Nursing, Physical Therapy, Occupational Therapy  Community Resources: Home Care  Equipment currently used at home: wheelchair, manual  Supplies currently used at home: Other    Employment/Financial:  Employment Status: retired        Financial Concerns:     Referral to Financial Worker: No       Does the patient's insurance plan have a 3 day qualifying hospital stay waiver?  Yes     Which insurance plan 3 day waiver is available? Alternative insurance waiver    Will the waiver be used for post-acute placement? Undetermined at this time    Lifestyle & Psychosocial Needs:  Social Determinants of Health     Food Insecurity: Not on file   Depression: Not at risk (12/1/2022)    PHQ-2     PHQ-2 Score: 0   Housing Stability: Not on file   Tobacco Use: Medium Risk (7/21/2023)    Patient History     Smoking Tobacco Use: Former     Smokeless Tobacco Use: Never     Passive Exposure: Not on file   Financial Resource Strain: Not on file   Alcohol Use: Not on file   Transportation Needs: Not on file   Physical  Activity: Not on file   Interpersonal Safety: Not on file   Stress: Not on file   Social Connections: Not on file       Functional Status:  Prior to admission patient needed assistance:   Dependent ADLs:: Wheelchair-with assist, Ambulation-walker, Bathing  Dependent IADLs:: Cleaning, Transportation, Cooking, Laundry, Shopping, Meal Preparation, Medication Management       Mental Health Status:  Mental Health Status: No Current Concerns       Chemical Dependency Status:  Chemical Dependency Status: No Current Concerns             Values/Beliefs:  Spiritual, Cultural Beliefs, Adventist Practices, Values that affect care: no               Additional Information:  12:45 PM  [facility] confirmed Pt is currently open with [facility] for home PT/OT/RN services.      Pt lives alone in her own home. Pt's children and grandchildren stop by Pt's home for meals and medications three times per day. Pt is independent with basic ADLs but requires assistance with IADLs. Pt mostly uses her wheelchair but does use her walker at times for short distances in her home. Pt is open to home care services. Family to transport at discharge.    Pt had recently stayed at [facility] for TCU after a hospitalization in July 2023. Therapy consults pending. CM following for recommendations at discharge.      WILEY Hoff

## 2023-10-24 ENCOUNTER — APPOINTMENT (OUTPATIENT)
Dept: OCCUPATIONAL THERAPY | Facility: HOSPITAL | Age: 78
DRG: 392 | End: 2023-10-24
Attending: INTERNAL MEDICINE
Payer: COMMERCIAL

## 2023-10-24 ENCOUNTER — APPOINTMENT (OUTPATIENT)
Dept: PHYSICAL THERAPY | Facility: HOSPITAL | Age: 78
DRG: 392 | End: 2023-10-24
Payer: COMMERCIAL

## 2023-10-24 VITALS
DIASTOLIC BLOOD PRESSURE: 66 MMHG | OXYGEN SATURATION: 94 % | TEMPERATURE: 98.7 F | SYSTOLIC BLOOD PRESSURE: 132 MMHG | WEIGHT: 121.69 LBS | RESPIRATION RATE: 21 BRPM | HEIGHT: 66 IN | BODY MASS INDEX: 19.56 KG/M2 | HEART RATE: 106 BPM

## 2023-10-24 LAB
MAGNESIUM SERPL-MCNC: 1.6 MG/DL (ref 1.7–2.3)
POTASSIUM SERPL-SCNC: 3.3 MMOL/L (ref 3.4–5.3)

## 2023-10-24 PROCEDURE — 250N000013 HC RX MED GY IP 250 OP 250 PS 637: Performed by: FAMILY MEDICINE

## 2023-10-24 PROCEDURE — 36415 COLL VENOUS BLD VENIPUNCTURE: CPT | Performed by: FAMILY MEDICINE

## 2023-10-24 PROCEDURE — 250N000011 HC RX IP 250 OP 636: Mod: JZ | Performed by: INTERNAL MEDICINE

## 2023-10-24 PROCEDURE — 97535 SELF CARE MNGMENT TRAINING: CPT | Mod: GO

## 2023-10-24 PROCEDURE — 97530 THERAPEUTIC ACTIVITIES: CPT | Mod: GP

## 2023-10-24 PROCEDURE — 84132 ASSAY OF SERUM POTASSIUM: CPT | Performed by: FAMILY MEDICINE

## 2023-10-24 PROCEDURE — 99239 HOSP IP/OBS DSCHRG MGMT >30: CPT | Performed by: FAMILY MEDICINE

## 2023-10-24 PROCEDURE — 83735 ASSAY OF MAGNESIUM: CPT | Performed by: FAMILY MEDICINE

## 2023-10-24 PROCEDURE — 97162 PT EVAL MOD COMPLEX 30 MIN: CPT | Mod: GP

## 2023-10-24 PROCEDURE — 250N000013 HC RX MED GY IP 250 OP 250 PS 637: Performed by: INTERNAL MEDICINE

## 2023-10-24 PROCEDURE — 258N000003 HC RX IP 258 OP 636: Performed by: EMERGENCY MEDICINE

## 2023-10-24 PROCEDURE — 97165 OT EVAL LOW COMPLEX 30 MIN: CPT | Mod: GO

## 2023-10-24 RX ORDER — GABAPENTIN 100 MG/1
200 CAPSULE ORAL
Status: DISCONTINUED | OUTPATIENT
Start: 2023-10-24 | End: 2023-10-24 | Stop reason: HOSPADM

## 2023-10-24 RX ORDER — GABAPENTIN 300 MG/1
300 CAPSULE ORAL AT BEDTIME
Status: DISCONTINUED | OUTPATIENT
Start: 2023-10-24 | End: 2023-10-24 | Stop reason: HOSPADM

## 2023-10-24 RX ORDER — ACETAMINOPHEN AND CODEINE PHOSPHATE 300; 30 MG/1; MG/1
1 TABLET ORAL EVERY 6 HOURS PRN
Status: DISCONTINUED | OUTPATIENT
Start: 2023-10-24 | End: 2023-10-24 | Stop reason: HOSPADM

## 2023-10-24 RX ORDER — ACETAMINOPHEN 325 MG/1
650 TABLET ORAL 3 TIMES DAILY
Status: DISCONTINUED | OUTPATIENT
Start: 2023-10-24 | End: 2023-10-24 | Stop reason: HOSPADM

## 2023-10-24 RX ADMIN — ACETAMINOPHEN AND CODEINE PHOSPHATE 1 TABLET: 300; 30 TABLET ORAL at 09:07

## 2023-10-24 RX ADMIN — MECLIZINE HYDROCHLORIDE 12.5 MG: 12.5 TABLET ORAL at 08:53

## 2023-10-24 RX ADMIN — SODIUM CHLORIDE, POTASSIUM CHLORIDE, SODIUM LACTATE AND CALCIUM CHLORIDE: 600; 310; 30; 20 INJECTION, SOLUTION INTRAVENOUS at 05:26

## 2023-10-24 RX ADMIN — ACETAMINOPHEN 650 MG: 325 TABLET ORAL at 13:11

## 2023-10-24 RX ADMIN — OXYCODONE HYDROCHLORIDE 5 MG: 5 TABLET ORAL at 00:17

## 2023-10-24 RX ADMIN — Medication 2 CAPSULE: at 13:11

## 2023-10-24 RX ADMIN — PANTOPRAZOLE SODIUM 40 MG: 40 TABLET, DELAYED RELEASE ORAL at 06:58

## 2023-10-24 RX ADMIN — LEVETIRACETAM 750 MG: 500 TABLET, FILM COATED ORAL at 08:45

## 2023-10-24 RX ADMIN — Medication 50 MCG: at 08:51

## 2023-10-24 RX ADMIN — FERROUS SULFATE TAB 325 MG (65 MG ELEMENTAL FE) 325 MG: 325 (65 FE) TAB at 08:44

## 2023-10-24 RX ADMIN — GABAPENTIN 200 MG: 100 CAPSULE ORAL at 08:54

## 2023-10-24 RX ADMIN — ACETAMINOPHEN 650 MG: 325 TABLET ORAL at 08:52

## 2023-10-24 RX ADMIN — ACETAMINOPHEN AND CODEINE PHOSPHATE 1 TABLET: 300; 30 TABLET ORAL at 15:08

## 2023-10-24 RX ADMIN — Medication 2 CAPSULE: at 08:52

## 2023-10-24 RX ADMIN — THERA TABS 1 TABLET: TAB at 08:44

## 2023-10-24 RX ADMIN — ENOXAPARIN SODIUM 30 MG: 30 INJECTION SUBCUTANEOUS at 13:11

## 2023-10-24 ASSESSMENT — ACTIVITIES OF DAILY LIVING (ADL)
ADLS_ACUITY_SCORE: 42

## 2023-10-24 NOTE — PROGRESS NOTES
"Occupational Therapy     10/24/23 1105   Appointment Info   Signing Clinician's Name / Credentials (OT) JACKI Urrutia   Student Supervision Direct supervision provided   Living Environment   People in Home alone  (adult dgtr visits/checks in ~3x a week)   Current Living Arrangements house   Home Accessibility no concerns   Living Environment Comments Pt has small BR w/ step over tub w/ multiple GB and SC, raised toilet and GB by toilet   Self-Care   Usual Activity Tolerance fair   Current Activity Tolerance poor   Fall history within last six months yes   Number of times patient has fallen within last six months 1   Activity/Exercise/Self-Care Comment Baseline pt reports indp w/ toileting, dressing, trfrs, and bathing but reports hasn't been bathing self lately.   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 difficile infection,depression, trigeminal neuralgia admitted on 10/22/2023 with diarrhea, poor oral intake and generalized body weakness.\"   Existing Precautions/Restrictions fall   Cognitive Status Examination   Follows Commands WFL   Cognitive Status Comments Not assessed but seems WFL   Posture   Posture forward head position;protracted shoulders;kyphosis   Range of Motion Comprehensive   Comment, General Range of Motion Not assessed but UE seems WFL   Strength Comprehensive (MMT)   Comment, General Manual Muscle Testing (MMT) Assessment Not assessed but UE seems WFL   Bed Mobility   Bed Mobility supine-sit   Supine-Sit " [place] (Bed Mobility) contact guard   Assistive Device (Bed Mobility) bed rails   Comment (Bed Mobility) Sup<EOB w/ slightly raised HOB and extended time, increased effort   Transfers   Transfers sit-stand transfer   Sit-Stand Transfer   Sit-Stand [place] (Transfers) minimum assist (75% patient effort)   Sit/Stand Transfer Comments Initial STS from raised EOB, pt reports doesn't like using FWW so no AD used   Balance   Balance Assessment standing static balance;standing dynamic balance   Activities of Daily Living   BADL Assessment/Intervention lower body dressing   Lower Body Dressing Assessment/Training   Comment, (Lower Body Dressing) Seated in WC pt don/doff socks w/ SBA   Clinical Impression   Criteria for Skilled Therapeutic Interventions Met (OT) Yes, treatment indicated   OT Diagnosis decreased ADLs/safetey awareness   OT Problem List-Impairments impacting ADL problems related to;activity tolerance impaired;balance;mobility;strength;postural control   Assessment of Occupational Performance 1-3 Performance Deficits   Identified Performance Deficits ADLs, activity tolerance, trfrs   Planned Therapy Interventions (OT) ADL retraining;balance training;bed mobility training;strengthening;transfer training;progressive activity/exercise   Clinical Decision Making Complexity (OT) problem focused assessment/low complexity   Risk & Benefits of therapy have been explained evaluation/treatment results reviewed;care plan/treatment goals reviewed;participants voiced agreement with care plan;patient   OT Total Evaluation Time   OT Eval, Low Complexity Minutes (30713) 15   OT Goals   Therapy Frequency (OT) Daily   OT Predicted Duration/Target Date for Goal Attainment 10/31/23   OT Goals Toilet Transfer/Toileting;Lower Body Dressing   OT: Lower Body Dressing Supervision/stand-by assist  (don/doff briefs seated)   OT: Toilet Transfer/Toileting Minimal assist   Self-Care/Home Management   Self-Care/Home Mgmt/ADL,  Compensatory, Meal Prep Minutes (02703) 15   Symptoms Noted During/After Treatment (Meal Preparation/Planning Training) fatigue   Treatment Detail/Skilled Intervention During session pt demo sit-scoot at EOB w/ SBA. Pt then demo pivot trfr from raised EOB to/from WC w/ min/mod Ax1 no AD, pt weak and slightly unsteady but able to complete. Throughout session pt required extended time for pivot trfrs/general mobility. Reviewed w/ pt home set up and discussed D/C plan, pt report interested/aggreable in home health and eager to return home. End of session pt EOB<Sup SBA, call light w/in reach and bed alarm on.   OT Discharge Planning   OT Plan close trfrs, seated LB dressing (brief), toilet trfr   OT Discharge Recommendation (DC Rec) Transitional Care Facility  (@ this time, 24 hr A recommended if DC's home.)   OT Rationale for DC Rec Pt requires min/mod A for pivot tfrs and demo increased weakness impacting ADL indp/safety awareness. Pt lives home alone w/ adult dgtr checking in ~3x a week but unable to provide 24hr supervision/assist. Rec TCU for pt to increase strength for increased ADL indp/safety awareness. Pt eager to return home so if return home rec 24hr supervision d/t fall risk and home OT to increase ADL indp/performance and safety awareness.   OT Brief overview of current status min Ax1-2 pivot trfr   Total Session Time   Timed Code Treatment Minutes 15   Total Session Time (sum of timed and untimed services) 30

## 2023-10-24 NOTE — PLAN OF CARE
Problem: Pain Acute  Goal: Optimal Pain Control and Function  Outcome: Not Progressing  Intervention: Develop Pain Management Plan  Recent Flowsheet Documentation  Taken 10/24/2023 0020 by Osei Avila RN  Pain Management Interventions: medication (see MAR)  Intervention: Prevent or Manage Pain  Recent Flowsheet Documentation  Taken 10/24/2023 0020 by Osei Avila RN  Medication Review/Management: medications reviewed  Intervention: Optimize Psychosocial Wellbeing  Recent Flowsheet Documentation  Taken 10/24/2023 0020 by Osei Avila RN  Supportive Measures: active listening utilized   Goal Outcome Evaluation:    Problem: Risk for Delirium  Goal: Optimal Coping  Outcome: Progressing  Intervention: Optimize Psychosocial Adjustment to Delirium  Recent Flowsheet Documentation  Taken 10/24/2023 0020 by Osei Avila RN  Supportive Measures: active listening utilized  Goal: Improved Attention and Thought Clarity  Outcome: Progressing  Intervention: Maximize Cognitive Function  Recent Flowsheet Documentation  Taken 10/24/2023 0020 by Osei Avila RN  Reorientation Measures: clock in view   Goal Outcome Evaluation:       Pt is A&O. Vital signs stable. Pt rated head pain at a 7. PRN PO oxycodone given. Pt verbalized being upset that they do not have PRN tylenol anymore. Pt states that tylenol helps more than oxycodone. Pt utilizing bed pan. Pt able to make needs known.   Osei Avila RN

## 2023-10-24 NOTE — PLAN OF CARE
Problem: Pain Acute  Goal: Optimal Pain Control and Function  Outcome: Not Progressing  Intervention: Prevent or Manage Pain  Recent Flowsheet Documentation  Taken 10/23/2023 1722 by Shruti Kwan RN  Medication Review/Management: medications reviewed  Intervention: Optimize Psychosocial Wellbeing  Recent Flowsheet Documentation  Taken 10/23/2023 1722 by Shruti Kwan RN  Supportive Measures: active listening utilized   Goal Outcome Evaluation:       Patient complained of pain 6/10 tylenol given by the previous shift. Patient had nos isgns or symptoms of delirium on this 4 hrs. Shift. Continue to monitor.  Shruti Roach RN

## 2023-10-24 NOTE — DISCHARGE SUMMARY
[facility]  Hospitalist Discharge Summary      Date of Admission:  10/22/2023  Date of Discharge:  10/24/2023  Discharging Provider: Dahlia Esteves MD  Discharge Service: Hospitalist Service    Discharge Diagnoses     Norovirus infection with diarrhea.  Resolved.  Hypokalemia, improved.  Weakness with falls secondary to above.  TCU recommended and patient declined  Brain abscess, status post left frontal ventriculostomy, 2022  Seizure disorder  Chronic pain with opioid dependence  Major depressive disorder  Previous history C. difficile.  C. difficile testing negative here  Trigeminal neuralgia  Peptic ulcer disease    Clinically Significant Risk Factors          Follow-ups Needed After Discharge   Follow-up Appointments     Follow-up and recommended labs and tests       Follow up with primary care provider, Mara Murillo, within 7-14 days for   hospital follow- up.                Discharge Disposition   Discharged to home  Condition at discharge: Stable    Hospital Course   Julisa Chaney is a 78 year old female with history of severe malnutrition, left lateral ventricular/frontal lobe abscess status post left frontal ventriculostomy on 7/31/2022, prior C. difficile infection, chronic pain with opioid dependence admitted on 10/22/2023 with diarrhea, poor oral intake and generalized body weakness.  She had a known history of norovirus infection and came in due to significant weakness associated with diarrhea.  She had fallen as well.  Patient is wheelchair-bound although mainly independent with living.  She was felt too weak to go home and came into the hospital she was treated with IV fluids.  Seen by GI who recommended psyllium capsules once C. difficile has been ruled out.  C. difficile testing negative.  Diarrhea stopped quickly.  She was seen for weakness by PT OT with TCU recommended which she vehemently declined.  She was agreeable to home care therapy.  Symptoms improved and discharged  home in good condition with home care.  Detailed hospital course per below    Diarrhea  History of C. difficile infection  Recent norovirus infection 10/16/2023.  resolved   C. difficile negative   Tolerated diet advancement  Appreciate GI consult.  GI added psyllium capsules twice daily and Imodium  GI holding off on colonoscopy unless has persistent diarrhea and another 48 to 72 hours.    Hypokalemia  Resolved   secondary to ongoing diarrhea   Improved on potassium protocol   Stop telemetry is stable     Fall  Reported fall in bathroom 3 to 4 days ago.  X-rays negative  CT head and cervical spine negative  PT and OT ordered recommended TCU which she declined    Consultations This Hospital Stay   GASTROENTEROLOGY IP CONSULT  PHYSICAL THERAPY ADULT IP CONSULT  OCCUPATIONAL THERAPY ADULT IP CONSULT  CARE MANAGEMENT / SOCIAL WORK IP CONSULT    Code Status   Full Code    Time Spent on this Encounter   I, Dahlia Esteves MD, personally saw the patient today and spent greater than 30 minutes discharging this patient.       Dahlia Esteves MD  [address] [address]  Phone: [phone]  Fax: [phone]  ______________________________________________________________________    Physical Exam   Vital Signs: Temp: 98.7  F (37.1  C) Temp src: Oral BP: 132/66 Pulse: 106   Resp: 21 SpO2: 94 % O2 Device: None (Room air)    Weight: 121 lbs 11.1 oz  General Appearance: Frail in appearance.  Much more bright than yesterday.  No apparent distress.  Respiratory: Kyphotic, clear to auscultation  Cardiovascular: Regular rate and rhythm  GI: Soft and nontender without hepatosplenomegaly  Skin: No significant lower extremity edema  Other: Neurologically grossly intact without focal deficits appreciated       Primary Care Physician   Mara Murillo    Discharge Orders      Home Care Referral      Reason for your hospital stay    Weakness from norovirus infection     Follow-up and  recommended labs and tests     Follow up with primary care provider, Mara Murillo, within 7-14 days for hospital follow- up.     Activity    Your activity upon discharge: activity as tolerated     Diet    Follow this diet upon discharge: Orders Placed This Encounter      Regular Diet Adult       Significant Results and Procedures   Most Recent 3 CBC's:  Recent Labs   Lab Test 10/22/23  1105 10/16/23  1437 08/24/23  1721   WBC 7.6 6.4 5.7   HGB 15.6 13.2 13.1   MCV 87 87 89    410 326     Most Recent 3 BMP's:  Recent Labs   Lab Test 10/24/23  0539 10/23/23  0605 10/22/23  2340 10/22/23  1525 10/22/23  1105 10/16/23  1437 08/24/23  1721   NA  --   --   --   --  142 144 142   POTASSIUM 3.3* 4.4 3.0* 2.6* 2.6* 3.1* 3.8   CHLORIDE  --   --   --   --  101 106 106   CO2  --   --   --   --  20* 25 27   BUN  --   --   --   --  6.8* 12.9 15.7   CR  --   --   --  0.65 0.66 0.67 0.77   ANIONGAP  --   --   --   --  21* 13 9   ADY  --   --   --   --  10.0 9.3 9.0   GLC  --   --   --   --  82 94 122*     7-Day Micro Results       Collected Updated Procedure Result Status      10/22/2023 2055 10/23/2023 0109 C. difficile Toxin B PCR with reflex to C. difficile Antigen and Toxins A/B EIA [[id number]]    Stool from Per Rectum    Final result Component Value   C Difficile Toxin B by PCR Negative   A negative result does not exclude actual disease due to C. difficile and may be due to improper collection, handling and storage of the specimen or the number of organisms in the specimen is below the detection limit of the assay.            10/22/2023 1451 10/22/2023 1548 Asymptomatic COVID-19 Virus (Coronavirus) by PCR Nose [[id number]]    Swab from Nose    Final result Component Value   SARS CoV2 PCR Negative   NEGATIVE: SARS-CoV-2 (COVID-19) RNA not detected, presumed negative.                ,   Results for orders placed or performed during the hospital encounter of 10/22/23   CT Head w/o Contrast    Narrative    EXAM: CT  HEAD W/O CONTRAST, CT CERVICAL SPINE W/O CONTRAST  LOCATION: [facility]  DATE: 10/22/2023    INDICATION: History of fall  COMPARISON: 08/29/2022  TECHNIQUE:   1) Routine CT Head without IV contrast. Multiplanar reformats. Dose reduction techniques were used.  2) Routine CT Cervical Spine without IV contrast. Multiplanar reformats. Dose reduction techniques were used.    FINDINGS:   HEAD CT:   INTRACRANIAL CONTENTS: No intracranial hemorrhage, extraaxial collection, or mass effect.  No CT evidence of acute infarct. Chronic bilateral cerebellar infarcts. Chronic tiny left frontal infarct. Mild presumed chronic small vessel ischemic changes.   Mild generalized volume loss. No hydrocephalus.     VISUALIZED ORBITS/SINUSES/MASTOIDS: Prior bilateral cataract surgery. Visualized portions of the orbits are otherwise unremarkable. No paranasal sinus mucosal disease. No middle ear or mastoid effusion.    BONES/SOFT TISSUES: No acute abnormality.    CERVICAL SPINE CT:   VERTEBRA: Normal vertebral body heights and alignment. No fracture or posttraumatic subluxation. Absent posterior right C1 arch.    CANAL/FORAMINA: No severe spinal canal stenosis. Severe osseous foraminal stenosis on the left at C4-C5    PARASPINAL: No extraspinal abnormality. Mild COPD.      Impression    IMPRESSION:  HEAD CT:  1.  No CT evidence for acute intracranial process.  2.  Brain atrophy and presumed chronic microvascular ischemic changes as above.  3.  Chronic ischemic changes above.    CERVICAL SPINE CT:  1.  No CT evidence for acute fracture or post traumatic subluxation. Severe osseous foraminal stenosis in the left at C4-C5.   CT Cervical Spine w/o Contrast    Narrative    EXAM: CT HEAD W/O CONTRAST, CT CERVICAL SPINE W/O CONTRAST  LOCATION: [facility]  DATE: 10/22/2023    INDICATION: History of fall  COMPARISON: 08/29/2022  TECHNIQUE:   1) Routine CT Head without IV contrast. Multiplanar  reformats. Dose reduction techniques were used.  2) Routine CT Cervical Spine without IV contrast. Multiplanar reformats. Dose reduction techniques were used.    FINDINGS:   HEAD CT:   INTRACRANIAL CONTENTS: No intracranial hemorrhage, extraaxial collection, or mass effect.  No CT evidence of acute infarct. Chronic bilateral cerebellar infarcts. Chronic tiny left frontal infarct. Mild presumed chronic small vessel ischemic changes.   Mild generalized volume loss. No hydrocephalus.     VISUALIZED ORBITS/SINUSES/MASTOIDS: Prior bilateral cataract surgery. Visualized portions of the orbits are otherwise unremarkable. No paranasal sinus mucosal disease. No middle ear or mastoid effusion.    BONES/SOFT TISSUES: No acute abnormality.    CERVICAL SPINE CT:   VERTEBRA: Normal vertebral body heights and alignment. No fracture or posttraumatic subluxation. Absent posterior right C1 arch.    CANAL/FORAMINA: No severe spinal canal stenosis. Severe osseous foraminal stenosis on the left at C4-C5    PARASPINAL: No extraspinal abnormality. Mild COPD.      Impression    IMPRESSION:  HEAD CT:  1.  No CT evidence for acute intracranial process.  2.  Brain atrophy and presumed chronic microvascular ischemic changes as above.  3.  Chronic ischemic changes above.    CERVICAL SPINE CT:  1.  No CT evidence for acute fracture or post traumatic subluxation. Severe osseous foraminal stenosis in the left at C4-C5.   XR Knee Bilateral 3 Views    Narrative    EXAM: XR KNEE BILATERAL 3 VIEWS  LOCATION: [facility]  DATE: 10/22/2023    INDICATION: History of fall, bilateral knee pain.  COMPARISON: None.      Impression    IMPRESSION:   RIGHT KNEE: Osteopenia. No fracture. No effusion. No degenerative changes.    LEFT KNEE: No fracture. No effusion. Osteopenia. No degenerative changes.       Discharge Medications   Current Discharge Medication List        START taking these medications    Details   psyllium  (METAMUCIL/KONSYL) capsule Take 2 capsules by mouth 2 times daily  Qty: 360 capsule, Refills: 3    Comments: Pharmacy to dispense capsule size that is available.  Associated Diagnoses: Diarrhea, unspecified type           CONTINUE these medications which have NOT CHANGED    Details   acetaminophen-codeine (TYLENOL #3) 300-30 MG per tablet Take 1 tablet by mouth every 6 hours as needed for severe pain  Qty: 120 tablet, Refills: 0    Associated Diagnoses: Trigeminal neuralgia; Chronic pain syndrome      desonide (DESOWEN) 0.05 % external cream Apply to affected area 2 times daily  Qty: 60 g, Refills: 0    Associated Diagnoses: Atopic dermatitis, mild      ferrous sulfate (FEROSUL) 325 (65 Fe) MG tablet Take 1 tablet (325 mg) by mouth daily (with breakfast)  Qty: 30 tablet, Refills: 1    Associated Diagnoses: Iron deficiency anemia due to chronic blood loss      gabapentin (NEURONTIN) 100 MG capsule Take 1 capsule (100 mg) by mouth daily (with breakfast) AND 3 capsules (300 mg) At Bedtime.  Qty: 120 capsule, Refills: 0    Associated Diagnoses: Trigeminal neuralgia; Chronic pain syndrome      levETIRAcetam (KEPPRA) 750 MG tablet Take 1 tablet (750 mg) by mouth 2 times daily  Qty: 60 tablet, Refills: 0    Associated Diagnoses: Persistent insomnia      meclizine (ANTIVERT) 12.5 MG tablet Take 1 tablet (12.5 mg) by mouth 2 times daily  Qty: 60 tablet, Refills: 1    Associated Diagnoses: Dizziness      mirtazapine (REMERON) 15 MG tablet Take 1 tablet (15 mg) by mouth at bedtime  Qty: 30 tablet, Refills: 0    Associated Diagnoses: Current mild episode of major depressive disorder without prior episode (H24)      Multiple Vitamin (MULTIVITAMIN) TABS Take 1 tablet by mouth daily  Qty: 100 tablet, Refills: 3    Associated Diagnoses: Trigeminal nerve disorder      nortriptyline (PAMELOR) 50 MG capsule Take 1 capsule (50 mg) by mouth At Bedtime  Qty: 90 capsule, Refills: 3    Associated Diagnoses: Trigeminal neuralgia; Chronic  pain syndrome      omeprazole (PRILOSEC) 40 MG DR capsule TAKE 1 CAPSULE(40 MG) BY MOUTH DAILY  Qty: 90 capsule, Refills: 3    Associated Diagnoses: Gastroesophageal reflux disease without esophagitis      OXcarbazepine (TRILEPTAL) 150 MG tablet TAKE 3 TABLETS(450 MG) BY MOUTH DAILY  Qty: 270 tablet, Refills: 1    Comments: **Patient requests 90 days supply**  Associated Diagnoses: Facial neuralgia      polyvinyl alcohol (ARTIFICIAL TEARS) 1.4 % ophthalmic solution Place 1 drop into both eyes every hour as needed for dry eyes  Qty: 60 mL, Refills: 0    Associated Diagnoses: Lung infiltrate; Infection due to 2019 novel coronavirus; Sepsis, due to unspecified organism, unspecified whether acute organ dysfunction present (H); Pyogenic brain abscess; Encephalitis; ORTIZ (acute kidney injury) (H24); Pyloric ulcer, chronic; Current mild episode of major depressive disorder without prior episode (H24); Abnormal chest CT; Chronic pain syndrome; Hypercalcemia; Disorder of bone and cartilage; Pure hypercholesterolemia; Other migraine without status migrainosus, not intractable; Trigeminal neuralgia; Counseling regarding advanced directives and goals of care; Controlled substance agreement signed      QUEtiapine (SEROQUEL) 50 MG tablet Take 1 tablet (50 mg) by mouth At Bedtime  Qty: 30 tablet, Refills: 0    Associated Diagnoses: Anxiety      senna-docusate (STIMULANT LAXATIVE) 8.6-50 MG tablet TAKE 1 TABLET BY MOUTH AT BEDTIME  Qty: 90 tablet, Refills: 3    Associated Diagnoses: Constipation, unspecified constipation type      topiramate (TOPAMAX) 50 MG tablet Take 1 tablet (50 mg) by mouth at bedtime  Qty: 30 tablet, Refills: 0    Associated Diagnoses: Intractable chronic migraine without aura and without status migrainosus      Vitamin D3 50 mcg (2000 units) tablet Take 1 tablet (50 mcg) by mouth daily  Qty: 90 tablet, Refills: 4    Associated Diagnoses: Vitamin D deficiency           Allergies   Allergies   Allergen Reactions     Contrast [Iohexol] Rash     Noticed during 08/2020 admission. Received IV contrast on 8/5    Chocolate Headache    Contrast Dye     Penicillins Rash

## 2023-10-24 NOTE — PLAN OF CARE
Problem: Risk for Delirium  Goal: Optimal Coping  Outcome: Progressing  Intervention: Optimize Psychosocial Adjustment to Delirium  Recent Flowsheet Documentation  Taken 10/24/2023 0020 by Osei Avila RN  Supportive Measures: active listening utilized  Goal: Improved Attention and Thought Clarity  Outcome: Progressing  Intervention: Maximize Cognitive Function  Recent Flowsheet Documentation  Taken 10/24/2023 0020 by Osei Avila, RN  Reorientation Measures: clock in view   Goal Outcome Evaluation:

## 2023-10-24 NOTE — PLAN OF CARE
Problem: Risk for Delirium  Goal: Improved Sleep  Outcome: Adequate for Care Transition   Goal Outcome Evaluation:               Patient received all education and paper work. All belongings packed and sent with Pt. and daughter. Patient left via wheel chair a companied by unit aid. No complications noted at this time.Shruti Roach RN

## 2023-10-24 NOTE — PROGRESS NOTES
"   10/24/23 4832   Appointment Info   Signing Clinician's Name / Credentials (PT) Naomi Ramos, PT, DPT   Rehab Comments (PT) Pt reported pain well managed today & was looking forward to moving   Living Environment   People in Home alone;child(geetha), adult  (Daughter works in the morning is able to assist at other times)   Current Living Arrangements house   Home Accessibility no concerns  (no stairs)   Living Environment Comments Per chart review \"She lives alone, however, daughter visited several times a day to assist with ADLs\"   Self-Care   Equipment Currently Used at Home wheelchair, manual   Fall history within last six months yes   Number of times patient has fallen within last six months 1   Activity/Exercise/Self-Care Comment pt reported able to self transfer to wheelchair at baseline, was recently working with Home PT; pt also reported not walking at baseline   General Information   Onset of Illness/Injury or Date of Surgery 10/22/23   Referring Physician Tonja Pandya MD   Patient/Family Therapy Goals Statement (PT) Return to home   Pertinent History of Current Problem (include personal factors and/or comorbidities that impact the POC) Per Chart Review - \"78 year old female with history of cognitive impairment, severe malnutrition, left lateral ventricular/frontal lobe abscess status post left frontal ventriculostomy on 7/31/2022, prior C. difficile infection,depression, trigeminal neuralgia, hyperlipidemia, and iron deficiency anemia admitted on 10/22/2023 with diarrhea, poor oral intake and generalized body weakness\"   Existing Precautions/Restrictions no known precautions/restrictions   Range of Motion (ROM)   Range of Motion ROM is WFL   Strength (Manual Muscle Testing)   Strength (Manual Muscle Testing) Deficits observed during functional mobility   Bed Mobility   Bed Mobility supine-sit   Supine-Sit [place] (Bed Mobility) supervision;contact guard;verbal cues   Transfers   Transfers " sit-stand transfer   Sit-Stand Transfer   Sit-Stand [place] (Transfers) verbal cues;supervision;minimum assist (75% patient effort)   Assistive Device (Sit-Stand Transfers)   (HHA & UE support on recliner chair or bed)   Comment, (Sit-Stand Transfer) completed from a raised surface   Gait/Stairs (Locomotion)   Comment, (Gait/Stairs) pt unable to complete functional gait at evaluation   Clinical Impression   Criteria for Skilled Therapeutic Intervention Yes, treatment indicated   PT Diagnosis (PT) Impaired Functional mobility   Influenced by the following impairments weakness, decreased ROM, impaired balance, Pain   Functional limitations due to impairments gait, wheelchair transfers, sit to stand transfers, bed mobility   Clinical Presentation (PT Evaluation Complexity) stable   Clinical Presentation Rationale pt presents as medically diagnosed   Clinical Decision Making (Complexity) moderate complexity   Planned Therapy Interventions (PT) balance training;gait training;home exercise program;ROM (range of motion);strengthening;transfer training;wheelchair management/propulsion training   Risk & Benefits of therapy have been explained evaluation/treatment results reviewed;patient   PT Total Evaluation Time   PT Eval, Moderate Complexity Minutes (67676) 10   Physical Therapy Goals   PT Frequency Daily   PT Predicted Duration/Target Date for Goal Attainment 10/28/23   PT Goals Transfers;Gait   PT: Transfers Sit to/from stand;Bed to/from chair  (CGA)   PT: Gait Assistive device;5 feet;Moderate assist;Rolling walker   Interventions   Interventions Quick Adds Therapeutic Activity   Therapeutic Activity   Therapeutic Activities: dynamic activities to improve functional performance Minutes (34317) 10   Symptoms Noted During/After Treatment Fatigue   Treatment Detail/Skilled Intervention stand pivot Transfer to chair from bed with few steps x1: Cueing for safety/steps forward & side/hand placement on stable surface, Min  A x1; Standing balance: cueing for safety/leaning forward/posture, Mod A x1 for maintain upright balance d/t posterior lean; Sit to/from stand x1 from lower surface: cueing for safety/hand placement, Mod A x2; chair alarm on and call light within reach at end of session   PT Discharge Planning   PT Plan Stand pivot transfers; sit to stand transfers; LE strengthening   PT Discharge Recommendation (DC Rec) Transitional Care Facility   PT Rationale for DC Rec TCU to improve strength, tolerance for activity, balance; pt is not at baseline mobility and at an increased risk for falls; requires mod A x2 for sit to stand from chair   PT Brief overview of current status Mod A x2 for sit to stand from chair; Min A x1 for sit to stand from raised surface/bed; CGA for bed mobility   Total Session Time   Timed Code Treatment Minutes 10   Total Session Time (sum of timed and untimed services) 20

## 2023-10-24 NOTE — PROGRESS NOTES
Care Management Discharge Note    Discharge Date: 10/24/2023       Discharge Disposition: Home with Home Care    Discharge Services: Home Care    Discharge DME: None    Discharge Transportation: family or friend will provide    Private pay costs discussed: Not applicable    Does the patient's insurance plan have a 3 day qualifying hospital stay waiver?  Yes     Which insurance plan 3 day waiver is available? Alternative insurance waiver    Will the waiver be used for post-acute placement? No    PAS Confirmation Code: N/A  Patient/family educated on Medicare website which has current facility and service quality ratings: no    Education Provided on the Discharge Plan: Yes  Persons Notified of Discharge Plans: patient; home care  Patient/Family in Agreement with the Plan: yes    Handoff Referral Completed: Yes    Additional Information:  10:53 AM  Hospitalist updated CM that Pt will likely discharge home today. Pt lives alone but has support from her family who stops by three times a day to help with meals and medication administration. Pt is already open to [facility] for home SN/PT/OT.    12:32 PM  Therapy is recommending TCU. [facility] met and spoke with Pt regarding recommendations for TCU. Pt declines TCU and would like to return home with continued home care services. Pt reports that she will continue to have her family stop by at least three times a day and feels confident that she does not need any additional support. SW reported understanding and told Pt that CM will notify [facility] of Pt's discharge so Pt can expect to hear from [facility] to schedule their next visit within 48 hours of discharge. Pt reported understanding and denies having any further questions or concerns for CM prior to discharge.     Home care orders sent to [facility].    CM will sign off. Please contact CM if any additional needs arise.      WILEY Hoff

## 2023-10-25 ENCOUNTER — HOSPITAL ENCOUNTER (OUTPATIENT)
Facility: HOSPITAL | Age: 78
Setting detail: OBSERVATION
Discharge: NURSING, REHAB OR LONG-TERM CARE | End: 2023-10-26
Attending: EMERGENCY MEDICINE | Admitting: EMERGENCY MEDICINE
Payer: COMMERCIAL

## 2023-10-25 DIAGNOSIS — G50.0 TRIGEMINAL NEURALGIA: ICD-10-CM

## 2023-10-25 DIAGNOSIS — G89.4 CHRONIC PAIN SYNDROME: ICD-10-CM

## 2023-10-25 DIAGNOSIS — R19.7 DIARRHEA, UNSPECIFIED TYPE: Primary | ICD-10-CM

## 2023-10-25 DIAGNOSIS — R62.7 FAILURE TO THRIVE IN ADULT: ICD-10-CM

## 2023-10-25 DIAGNOSIS — F41.9 ANXIETY: ICD-10-CM

## 2023-10-25 LAB
ANION GAP SERPL CALCULATED.3IONS-SCNC: 10 MMOL/L (ref 7–15)
BUN SERPL-MCNC: 8.7 MG/DL (ref 8–23)
CALCIUM SERPL-MCNC: 8.9 MG/DL (ref 8.8–10.2)
CHLORIDE SERPL-SCNC: 102 MMOL/L (ref 98–107)
CREAT SERPL-MCNC: 0.63 MG/DL (ref 0.51–0.95)
DEPRECATED HCO3 PLAS-SCNC: 30 MMOL/L (ref 22–29)
EGFRCR SERPLBLD CKD-EPI 2021: 90 ML/MIN/1.73M2
GLUCOSE SERPL-MCNC: 92 MG/DL (ref 70–99)
POTASSIUM SERPL-SCNC: 2.4 MMOL/L (ref 3.4–5.3)
SODIUM SERPL-SCNC: 142 MMOL/L (ref 135–145)

## 2023-10-25 PROCEDURE — 250N000013 HC RX MED GY IP 250 OP 250 PS 637: Performed by: INTERNAL MEDICINE

## 2023-10-25 PROCEDURE — G0378 HOSPITAL OBSERVATION PER HR: HCPCS

## 2023-10-25 PROCEDURE — 99285 EMERGENCY DEPT VISIT HI MDM: CPT

## 2023-10-25 PROCEDURE — 83735 ASSAY OF MAGNESIUM: CPT | Performed by: INTERNAL MEDICINE

## 2023-10-25 PROCEDURE — 250N000013 HC RX MED GY IP 250 OP 250 PS 637: Performed by: EMERGENCY MEDICINE

## 2023-10-25 PROCEDURE — 99221 1ST HOSP IP/OBS SF/LOW 40: CPT | Performed by: INTERNAL MEDICINE

## 2023-10-25 PROCEDURE — 36415 COLL VENOUS BLD VENIPUNCTURE: CPT | Performed by: INTERNAL MEDICINE

## 2023-10-25 PROCEDURE — 80048 BASIC METABOLIC PNL TOTAL CA: CPT | Performed by: INTERNAL MEDICINE

## 2023-10-25 RX ORDER — ONDANSETRON 2 MG/ML
4 INJECTION INTRAMUSCULAR; INTRAVENOUS EVERY 6 HOURS PRN
Status: DISCONTINUED | OUTPATIENT
Start: 2023-10-25 | End: 2023-10-26 | Stop reason: HOSPADM

## 2023-10-25 RX ORDER — NORTRIPTYLINE HYDROCHLORIDE 50 MG/1
50 CAPSULE ORAL AT BEDTIME
Status: DISCONTINUED | OUTPATIENT
Start: 2023-10-25 | End: 2023-10-26 | Stop reason: HOSPADM

## 2023-10-25 RX ORDER — MECLIZINE HCL 12.5 MG 12.5 MG/1
12.5 TABLET ORAL 2 TIMES DAILY
Status: DISCONTINUED | OUTPATIENT
Start: 2023-10-25 | End: 2023-10-26 | Stop reason: HOSPADM

## 2023-10-25 RX ORDER — ONDANSETRON 4 MG/1
4 TABLET, ORALLY DISINTEGRATING ORAL EVERY 6 HOURS PRN
Status: DISCONTINUED | OUTPATIENT
Start: 2023-10-25 | End: 2023-10-26 | Stop reason: HOSPADM

## 2023-10-25 RX ORDER — GABAPENTIN 300 MG/1
300 CAPSULE ORAL AT BEDTIME
Status: DISCONTINUED | OUTPATIENT
Start: 2023-10-25 | End: 2023-10-26

## 2023-10-25 RX ORDER — VITAMIN B COMPLEX
50 TABLET ORAL DAILY
Status: DISCONTINUED | OUTPATIENT
Start: 2023-10-26 | End: 2023-10-26 | Stop reason: HOSPADM

## 2023-10-25 RX ORDER — ACETAMINOPHEN 325 MG/1
650 TABLET ORAL EVERY 6 HOURS PRN
Status: DISCONTINUED | OUTPATIENT
Start: 2023-10-25 | End: 2023-10-26 | Stop reason: HOSPADM

## 2023-10-25 RX ORDER — POTASSIUM CHLORIDE 1500 MG/1
40 TABLET, EXTENDED RELEASE ORAL
Status: COMPLETED | OUTPATIENT
Start: 2023-10-25 | End: 2023-10-26

## 2023-10-25 RX ORDER — FERROUS SULFATE 325(65) MG
325 TABLET ORAL
Status: DISCONTINUED | OUTPATIENT
Start: 2023-10-26 | End: 2023-10-26 | Stop reason: HOSPADM

## 2023-10-25 RX ORDER — ACETAMINOPHEN AND CODEINE PHOSPHATE 300; 30 MG/1; MG/1
1 TABLET ORAL EVERY 6 HOURS PRN
Status: DISCONTINUED | OUTPATIENT
Start: 2023-10-25 | End: 2023-10-26 | Stop reason: HOSPADM

## 2023-10-25 RX ORDER — MIRTAZAPINE 15 MG/1
15 TABLET, FILM COATED ORAL AT BEDTIME
Status: DISCONTINUED | OUTPATIENT
Start: 2023-10-25 | End: 2023-10-26 | Stop reason: HOSPADM

## 2023-10-25 RX ORDER — TOPIRAMATE 25 MG/1
50 TABLET, FILM COATED ORAL AT BEDTIME
Status: DISCONTINUED | OUTPATIENT
Start: 2023-10-25 | End: 2023-10-26 | Stop reason: HOSPADM

## 2023-10-25 RX ORDER — PANTOPRAZOLE SODIUM 40 MG/1
40 TABLET, DELAYED RELEASE ORAL
Status: DISCONTINUED | OUTPATIENT
Start: 2023-10-26 | End: 2023-10-26 | Stop reason: HOSPADM

## 2023-10-25 RX ORDER — ACETAMINOPHEN 325 MG/1
650 TABLET ORAL ONCE
Status: COMPLETED | OUTPATIENT
Start: 2023-10-25 | End: 2023-10-25

## 2023-10-25 RX ORDER — QUETIAPINE FUMARATE 25 MG/1
50 TABLET, FILM COATED ORAL AT BEDTIME
Status: DISCONTINUED | OUTPATIENT
Start: 2023-10-25 | End: 2023-10-26 | Stop reason: HOSPADM

## 2023-10-25 RX ORDER — ACETAMINOPHEN 650 MG/1
650 SUPPOSITORY RECTAL EVERY 6 HOURS PRN
Status: DISCONTINUED | OUTPATIENT
Start: 2023-10-25 | End: 2023-10-26 | Stop reason: HOSPADM

## 2023-10-25 RX ORDER — OXCARBAZEPINE 150 MG/1
450 TABLET, FILM COATED ORAL EVERY EVENING
Status: DISCONTINUED | OUTPATIENT
Start: 2023-10-25 | End: 2023-10-26 | Stop reason: HOSPADM

## 2023-10-25 RX ORDER — POLYVINYL ALCOHOL 14 MG/ML
1 SOLUTION/ DROPS OPHTHALMIC
Status: DISCONTINUED | OUTPATIENT
Start: 2023-10-25 | End: 2023-10-26 | Stop reason: HOSPADM

## 2023-10-25 RX ORDER — GABAPENTIN 100 MG/1
100 CAPSULE ORAL
Status: DISCONTINUED | OUTPATIENT
Start: 2023-10-26 | End: 2023-10-26

## 2023-10-25 RX ADMIN — ACETAMINOPHEN 650 MG: 325 TABLET ORAL at 12:26

## 2023-10-25 RX ADMIN — GABAPENTIN 300 MG: 300 CAPSULE ORAL at 22:10

## 2023-10-25 RX ADMIN — Medication 2 CAPSULE: at 20:19

## 2023-10-25 RX ADMIN — MIRTAZAPINE 15 MG: 15 TABLET, FILM COATED ORAL at 22:09

## 2023-10-25 RX ADMIN — NORTRIPTYLINE HYDROCHLORIDE 50 MG: 50 CAPSULE ORAL at 22:10

## 2023-10-25 RX ADMIN — QUETIAPINE FUMARATE 50 MG: 25 TABLET ORAL at 22:10

## 2023-10-25 RX ADMIN — LEVETIRACETAM 750 MG: 500 TABLET, FILM COATED ORAL at 20:18

## 2023-10-25 RX ADMIN — OXCARBAZEPINE 450 MG: 150 TABLET, FILM COATED ORAL at 20:19

## 2023-10-25 RX ADMIN — TOPIRAMATE 50 MG: 25 TABLET, FILM COATED ORAL at 22:10

## 2023-10-25 RX ADMIN — ACETAMINOPHEN AND CODEINE PHOSPHATE 1 TABLET: 300; 30 TABLET ORAL at 20:19

## 2023-10-25 RX ADMIN — POTASSIUM CHLORIDE 40 MEQ: 1500 TABLET, EXTENDED RELEASE ORAL at 22:10

## 2023-10-25 RX ADMIN — MECLIZINE HYDROCHLORIDE 12.5 MG: 12.5 TABLET ORAL at 20:18

## 2023-10-25 ASSESSMENT — ACTIVITIES OF DAILY LIVING (ADL)
ADLS_ACUITY_SCORE: 35
DEPENDENT_IADLS:: CLEANING;TRANSPORTATION;COOKING;LAUNDRY;SHOPPING;MEAL PREPARATION;MEDICATION MANAGEMENT
ADLS_ACUITY_SCORE: 35

## 2023-10-25 NOTE — PROGRESS NOTES
Asked by JOSEF GARCIA to request prior auth for TCU. RNCM then went to [employer] portal and requested prior auth for TCU and uploaded all clinical info to the protal. RNCM will cont to follow for acceptance/denial of request and update JOSEF GARCIA.  [id number] Request ID

## 2023-10-25 NOTE — CONSULTS
"Care Management Initial Consult    General Information  Assessment completed with: Patient, Children, Julisa and daughter Alissa  Type of CM/SW Visit: Initial Assessment    Primary Care Provider verified and updated as needed: Yes   Readmission within the last 30 days: previous discharge plan unsuccessful (10/22/23 - 10/24/23)   Return Category: Exacerbation of disease  Reason for Consult: discharge planning  Advance Care Planning: Advance Care Planning Reviewed: no concerns identified          Communication Assessment  Patient's communication style: spoken language (English or Bilingual)             Cognitive  Cognitive/Neuro/Behavioral:                        Living Environment:   People in home: alone     Current living Arrangements: house (\"I can live on the main level\")      Able to return to prior arrangements: no (\"I can't do it at home. I need TCU\")       Family/Social Support:  Care provided by: self, child(geetha)  Provides care for: no one  Marital Status:   Children (\"grandkids\")          Description of Support System: Supportive, Involved    Support Assessment: Adequate family and caregiver support, Adequate social supports, Patient communicates needs well met    Current Resources:   Patient receiving home care services: Yes  Skilled Home Care Services: Skilled Nursing, Physical Therapy, Occupational Therapy ([facility])  Community Resources: Home Care  Equipment currently used at home: wheelchair, manual, walker, rolling, shower chair, grab bar, tub/shower, grab bar, toilet, raised toilet seat  Supplies currently used at home: Other (\"glasses\")    Employment/Financial:  Employment Status: retired     Employment/ Comments: \"my  had  benefits, but nothing that I use\"  Financial Concerns:     Referral to Financial Worker: No       Does the patient's insurance plan have a 3 day qualifying hospital stay waiver?  Yes     Which insurance plan 3 day waiver is available? " "Alternative insurance waiver    Will the waiver be used for post-acute placement? Yes, awaiting ric auth for stay 10/22/23 - 10/24/23 and back to ER for TCU placement 10/25/23.    Lifestyle & Psychosocial Needs:  Social Determinants of Health     Food Insecurity: Not on file   Depression: Not at risk (12/1/2022)    PHQ-2     PHQ-2 Score: 0   Housing Stability: Not on file   Tobacco Use: Medium Risk (7/21/2023)    Patient History     Smoking Tobacco Use: Former     Smokeless Tobacco Use: Never     Passive Exposure: Not on file   Financial Resource Strain: Not on file   Alcohol Use: Not on file   Transportation Needs: Not on file   Physical Activity: Not on file   Interpersonal Safety: Not on file   Stress: Not on file   Social Connections: Not on file       Functional Status:  Prior to admission patient needed assistance:   Dependent ADLs:: Wheelchair-with assist, Ambulation-walker, Bathing  Dependent IADLs:: Cleaning, Transportation, Cooking, Laundry, Shopping, Meal Preparation, Medication Management  Assesssment of Functional Status: Not at baseline with ADL Functioning, Not at baseline with mobility, Not at  functional baseline, Needs placement in a SNF/TCF for rehabilitation    Mental Health Status:          Chemical Dependency Status:                Values/Beliefs:  Spiritual, Cultural Beliefs, Hinduism Practices, Values that affect care:                 Additional Information:  Julisa lives in a house alone. Daughter Alissa helps with some ADLs and all IADLs. \"My daughter helps with stand by assist when I shower and then with anything else I need. I can usually dress myself, but not now. My daughter and grandkids help with anything I would need. They stop by for meals and medications 3 times a day to help me\".    Julisa was admitted 10/22/23 - 10/24/23. It was recommended by PT and OT that she go to TCU upon discharge. She refused TCU placement and wanted to go home with Home Care that was already helping " "her and her daughter's help. She was not home even 24 hours before returning to ER because of \"inability to care for herself and help from daughter isn't enough. I need to go to TCU\".    Has [facility] RN, PT, OT services from the [facility].     \"I mostly use the wheelchair all the time. I do use the walker for short distances like into the bathroom at home\".    Here for TCU placement. At [facility] in July and wants to return there. Awaiting [employer] Auth and then likely can go to TCU - Derian dotson aware of patient. For now the plan is for her to stay in the ED and await TCU placement, but unknown how long the [employer] Auth will take. Derian aware of need for patient to come as soon as auth is approved.     Family to transport at discharge.     Alissa daughter [phone].     CM to follow for medical progression of care, discharge recommendations, and final discharge plan.    Ning Dougherty RN    "

## 2023-10-25 NOTE — PROGRESS NOTES
"Daughter Alissa also alerted to the TCU plan. She states, \"as her main caregiver I am very excited that she is going to go to TCU. I don't think she is safe at home. I take care of her a lot, but I do work still also so I cannot be there with her all the time\". Alissa is aware that we are waiting still for the [employer] Auth, but that if and when we get that approval that [facility] TCU has accepted her.    She states, \"I get off work at 0900 on 10/26/23 and I want to drive her to the facility and not use transportation because I don't want that extra cost. I can drive her there at 0900 or after.\"  "

## 2023-10-25 NOTE — PROGRESS NOTES
"Derian from [facility] called and stated, \"we are good to accept the patient and ready for her to come as soon as the Auth comes through. Of course if the timing is too late today, we will have to wait until 10/26/23 for admission, but will plan for her to come tomorrow for sure\". Julisa updated about plan for TCU admission.    Still waiting for ev[employer]ore auth.  "

## 2023-10-25 NOTE — H&P
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 difficile infection, chronic pain with opioid dependence, trigeminal neuralgia, PUD who was admitted to [facility] 10/22-10/24/2023 with norovirus infection, electrolyte imbalance and weakness and declined TCU now presents with failure to thrive at home    Generalized weakness, failure to thrive.  Seen by RN RADHA, referrals to TCU sent, insurance authorization is pending.    Hypokalemia, hypomagnesemia.  Per patient chronic and likely exacerbated by recent diarrhea.  We will recheck labs today and replace as needed   Recent norovirus infection.  Improving   Trigeminal neuralgia.  Seizure disorder.  Left lateral ventricular/frontal lobe abscess s/p left frontal ventriculostomy on 7/31/2022.  Continue PTA gabapentin, oxcarbazepine, topiramate, Keppra, Tylenol 3   MDD.  Continue PTA medications      Diet: Combination Diet Regular Diet Adult    DVT Prophylaxis: Ambulate every shift  Abrams Catheter: Not present  Lines: None     Cardiac Monitoring: None  Code Status: Full Code      Clinically Significant Risk Factors Present on Admission        # Hypokalemia: Lowest K = 3.3 mmol/L in last 2 days, will replace as needed     # Hypomagnesemia: Lowest Mg = 1.6 mg/dL in last 2 days, will replace as needed                       Disposition Plan      Expected Discharge Date: 10/26/2023      Destination: inpatient rehabilitation facility            Marie Cartwright MD  Hospitalist Service  [facility]  Securely message with [employer] (more info)  Text page via [employer] Paging/Directory     ______________________________________________________________________    Chief Complaint    Weakness, failure to thrive at home    History is obtained from the patient    History of Present Illness   Julisa Chaney is a 78 year old female with PMH of severe malnutrition, 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 difficile infection, chronic pain with opioid dependence, trigeminal neuralgia, PUD who was admitted to [facility] 10/22-10/24/2023 with norovirus infection, electrolyte imbalance and weakness.  TCU was recommended however patient declined.  Patient lives alone and did not have anybody to help her.  Felt very weak and realize she could not care for herself.  She reports that diarrhea has overall improved.  She only had 1 loose stool today.  No abdominal pain or nausea.  No other focal complaints.  Vital signs in the emergency room remarkable for tachycardia heart rate 112.  Labs have not been drawn yet.        Past Medical History    Past Medical History:   Diagnosis Date    Aspiration pneumonia (H) 02/2022    Depression     High cholesterol     Migraines     Pyloric ulcer, chronic     Sepsis (H) 08/10/2020    Trigeminal neuralgia        Past Surgical History   Past Surgical History:   Procedure Laterality Date    HYSTERECTOMY      IR GASTROSTOMY TUBE PERCUTANEOUS PLCMNT  8/16/2022    PICC TRIPLE LUMEN PLACEMENT  7/22/2022         PA ESOPHAGOGASTRODUODENOSCOPY TRANSORAL DIAGNOSTIC N/A 8/13/2020    Procedure: ESOPHAGOGASTRODUODENOSCOPY (EGD);  Surgeon: Nathan Smalls MD;  [place] [facility] [place]  Service: Gastroenterology    PA ESOPHAGOGASTRODUODENOSCOPY TRANSORAL DIAGNOSTIC N/A 8/14/2020    Procedure: ESOPHAGOGASTRODUODENOSCOPY (EGD), PYLORIC DILATION;  Surgeon: Nathan Smalls MD;  [place] [facility] [place];  Service: Gastroenterology    VENTRICULOSTOMY Left 7/31/2022    Procedure: LEFT FRONTAL VENTRICULOSTOMY;  Surgeon: Brady Heredia MD;  Location: [facility] COLONOSCOPY W/WO BRUSH/WASH N/A 8/13/2020     Procedure: COLONOSCOPY WITH POLYPECTOMY;  Surgeon: Nathan Smalls MD;  Location: [facility];  Service: Gastroenterology       Prior to Admission Medications   Prior to Admission Medications   Prescriptions Last Dose Informant Patient Reported? Taking?   Multiple Vitamin (MULTIVITAMIN) TABS 10/24/2023 at am Self No Yes   Sig: Take 1 tablet by mouth daily   OXcarbazepine (TRILEPTAL) 150 MG tablet 10/24/2023 at pm  No Yes   Sig: TAKE 3 TABLETS(450 MG) BY MOUTH DAILY   QUEtiapine (SEROQUEL) 50 MG tablet 10/24/2023 at hs  No Yes   Sig: Take 1 tablet (50 mg) by mouth At Bedtime   Vitamin D3 50 mcg (2000 units) tablet 10/24/2023 at am Self No Yes   Sig: Take 1 tablet (50 mcg) by mouth daily   acetaminophen-codeine (TYLENOL #3) 300-30 MG per tablet 10/24/2023 at pm  No Yes   Sig: Take 1 tablet by mouth every 6 hours as needed for severe pain   desonide (DESOWEN) 0.05 % external cream Unknown  No Yes   Sig: Apply to affected area 2 times daily   ferrous sulfate (FEROSUL) 325 (65 Fe) MG tablet 10/24/2023 at am  No Yes   Sig: Take 1 tablet (325 mg) by mouth daily (with breakfast)   gabapentin (NEURONTIN) 100 MG capsule 10/24/2023 at hs  No Yes   Sig: Take 1 capsule (100 mg) by mouth daily (with breakfast) AND 3 capsules (300 mg) At Bedtime.   levETIRAcetam (KEPPRA) 750 MG tablet 10/24/2023 at hs  No Yes   Sig: Take 1 tablet (750 mg) by mouth 2 times daily   meclizine (ANTIVERT) 12.5 MG tablet 10/24/2023 at pm  No Yes   Sig: Take 1 tablet (12.5 mg) by mouth 2 times daily   mirtazapine (REMERON) 15 MG tablet 10/24/2023 at hs  No Yes   Sig: Take 1 tablet (15 mg) by mouth at bedtime   nortriptyline (PAMELOR) 50 MG capsule 10/24/2023 at hs  No Yes   Sig: Take 1 capsule (50 mg) by mouth At Bedtime   omeprazole (PRILOSEC) 40 MG DR capsule 10/24/2023 at am  No Yes   Sig: TAKE 1 CAPSULE(40 MG) BY MOUTH DAILY   polyvinyl alcohol (ARTIFICIAL TEARS) 1.4 % ophthalmic solution Unknown at prn Self No Yes   Sig: Place 1 drop into  both eyes every hour as needed for dry eyes   psyllium (METAMUCIL/KONSYL) capsule 10/24/2023 at pm  No Yes   Sig: Take 2 capsules by mouth 2 times daily   senna-docusate (STIMULANT LAXATIVE) 8.6-50 MG tablet 10/24/2023 at hs Self No Yes   Sig: TAKE 1 TABLET BY MOUTH AT BEDTIME   topiramate (TOPAMAX) 50 MG tablet 10/24/2023 at hs  No Yes   Sig: Take 1 tablet (50 mg) by mouth at bedtime      Facility-Administered Medications: None        Review of Systems    The 10 point Review of Systems is negative other than noted in the HPI or here.     Social History   I have reviewed this patient's social history and updated it with pertinent information if needed.  Social History     Tobacco Use    Smoking status: Former     Packs/day: 1.00     Years: 50.00     Additional pack years: 0.00     Total pack years: 50.00     Types: Cigarettes     Quit date: 2014     Years since quittin.9    Smokeless tobacco: Never   Substance Use Topics    Alcohol use: No    Drug use: No        Physical Exam   Vital Signs: Temp: 97.3  F (36.3  C) Temp src: Oral BP: 116/62 Pulse: 112   Resp: 18 SpO2: 93 % O2 Device: None (Room air)    Weight: 121 lbs 11.1 oz    General Appearance: No acute distress, sitting in a recliner  Respiratory: Respirations unlabored, lungs are clear  Cardiovascular: Regular rate and rhythm.  Normal S1-S2.  No leg edema  GI: Abdomen soft and nontender  Skin: Warm and dry      Medical Decision Making       50 MINUTES SPENT BY ME on the date of service doing chart review, history, exam, documentation & further activities per the note.  MANAGEMENT DISCUSSED with the following over the past 24 hours: Patient and nursing staff       Data

## 2023-10-25 NOTE — PHARMACY-ADMISSION MEDICATION HISTORY
Pharmacist Admission Medication History    Admission medication history is complete. The information provided in this note is only as accurate as the sources available at the time of the update.    Information Source(s): Patient and Hospital records via in-person    Pertinent Information: Patient discharged yesterday, stated she did take her medications last night but has taken nothing this morning     Changes made to PTA medication list:  Added: None  Deleted: None  Changed: None    Allergies reviewed with patient and updates made in EHR: yes    Medication History Completed By: JARON ROSARIO [facility] 10/25/2023 2:38 PM    PTA Med List   Medication Sig Last Dose    acetaminophen-codeine (TYLENOL #3) 300-30 MG per tablet Take 1 tablet by mouth every 6 hours as needed for severe pain 10/24/2023 at pm    desonide (DESOWEN) 0.05 % external cream Apply to affected area 2 times daily Unknown    ferrous sulfate (FEROSUL) 325 (65 Fe) MG tablet Take 1 tablet (325 mg) by mouth daily (with breakfast) 10/24/2023 at am    gabapentin (NEURONTIN) 100 MG capsule Take 1 capsule (100 mg) by mouth daily (with breakfast) AND 3 capsules (300 mg) At Bedtime. 10/24/2023 at hs    levETIRAcetam (KEPPRA) 750 MG tablet Take 1 tablet (750 mg) by mouth 2 times daily 10/24/2023 at hs    meclizine (ANTIVERT) 12.5 MG tablet Take 1 tablet (12.5 mg) by mouth 2 times daily 10/24/2023 at pm    mirtazapine (REMERON) 15 MG tablet Take 1 tablet (15 mg) by mouth at bedtime 10/24/2023 at hs    Multiple Vitamin (MULTIVITAMIN) TABS Take 1 tablet by mouth daily 10/24/2023 at am    nortriptyline (PAMELOR) 50 MG capsule Take 1 capsule (50 mg) by mouth At Bedtime 10/24/2023 at hs    omeprazole (PRILOSEC) 40 MG DR capsule TAKE 1 CAPSULE(40 MG) BY MOUTH DAILY 10/24/2023 at am    OXcarbazepine (TRILEPTAL) 150 MG tablet TAKE 3 TABLETS(450 MG) BY MOUTH DAILY 10/24/2023 at pm    polyvinyl alcohol (ARTIFICIAL TEARS) 1.4 % ophthalmic solution Place 1 drop into both eyes  every hour as needed for dry eyes Unknown at prn    psyllium (METAMUCIL/KONSYL) capsule Take 2 capsules by mouth 2 times daily 10/24/2023 at pm    QUEtiapine (SEROQUEL) 50 MG tablet Take 1 tablet (50 mg) by mouth At Bedtime 10/24/2023 at hs    senna-docusate (STIMULANT LAXATIVE) 8.6-50 MG tablet TAKE 1 TABLET BY MOUTH AT BEDTIME 10/24/2023 at hs    topiramate (TOPAMAX) 50 MG tablet Take 1 tablet (50 mg) by mouth at bedtime 10/24/2023 at hs    Vitamin D3 50 mcg (2000 units) tablet Take 1 tablet (50 mcg) by mouth daily 10/24/2023 at am

## 2023-10-25 NOTE — ED PROVIDER NOTES
EMERGENCY DEPARTMENT ENCOUNTER     NAME: Julisa Chaney   AGE: 78 year old female   YOB: 1945   MRN: [id number]   EVALUATION DATE & TIME: 10/25/2023 11:16 AM   PCP: Mara Murillo     Chief Complaint   Patient presents with    Dehydration   :    FINAL IMPRESSION       1. Failure to thrive in adult           ED COURSE & MEDICAL DECISION MAKING      11:16 PM I met with the patient, obtained history, performed an initial exam, and discussed options and plan for diagnostics and treatment here in the ED.  4:36 PM I spoke to hospitalist Dr. Cartwright.    Pertinent Labs & Imaging studies reviewed. (See chart for details)   78 year old female  presents to the Emergency Department for evaluation of ability to care for herself.  Patient just left the hospital yesterday and on chart review I can see that PT and OT recommended TCU placement which she declined.  She states that she went home, no family has been able to help her, and she returns because she cannot take care of herself and wants a TCU. Initial Vitals Reviewed. Initial exam notable for generally weak patient who has no focal symptoms but is sitting in a wheelchair and very clearly cannot care for herself.  We had care management meet with the patient and try and get TCU placement from the ED.  After 6 and half hours, we have been notified that she has been accepted at a TCU, but insurance has not approved it today and therefore it will be tomorrow.  Therefore, I discussed the case with hospitalist and we will place her under observation status so that she can receive care until she can be discharged to the TCU tomorrow.         At the conclusion of the encounter I discussed the results of all of the tests and the disposition. The questions were answered. The patient or family acknowledged understanding and was agreeable with the care plan.     0 minutes critical care time, see procedure note below for details if relevant    Medical Decision  Making    History:  Supplemental history from: Documented in chart, if applicable  External Record(s) reviewed: Documented in chart, if applicable.,  Discharge summary 10/24/2023    Work Up:  Chart documentation includes differential considered and any EKGs or imaging independently interpreted by provider, where specified.  In additional to work up documented, I considered the following work up: Documented in chart, if applicable.    External consultation:  Discussion of management with another provider: Hospitalist    Complicating factors:  Care impacted by chronic illness: Chronic Pain and Hyperlipidemia  Care affected by social determinants of health: No Support for Care at Home    Disposition considerations: Admit.      MEDICATIONS GIVEN IN THE EMERGENCY:   Medications   acetaminophen (TYLENOL) tablet 650 mg (650 mg Oral $Given 10/25/23 1226)      NEW PRESCRIPTIONS STARTED AT TODAY'S ER VISIT   New Prescriptions    No medications on file     ================================================================   HISTORY OF PRESENT ILLNESS       Patient information was obtained from: Nurse triage note   Use of Intrepreter: N/A   Julisa Chaney is a 78 year old female with history of dehydration, chronic pain, hypokalemia and osteopenia who presents with no support for cares at home.      ================================================================    Patient was at the hospital and had been discharged a few days ago but she is here as she has been generally weak and not able to take care of herself. Family is also not able to help her at home.      PAST HISTORY     PAST MEDICAL HISTORY:   Past Medical History:   Diagnosis Date    Aspiration pneumonia (H) 02/2022    Depression     High cholesterol     Migraines     Pyloric ulcer, chronic     Sepsis (H) 08/10/2020    Trigeminal neuralgia       PAST SURGICAL HISTORY:   Past Surgical History:   Procedure Laterality Date    HYSTERECTOMY      IR GASTROSTOMY TUBE  PERCUTANEOUS PLCMNT  8/16/2022    PICC TRIPLE LUMEN PLACEMENT  7/22/2022         WA ESOPHAGOGASTRODUODENOSCOPY TRANSORAL DIAGNOSTIC N/A 8/13/2020    Procedure: ESOPHAGOGASTRODUODENOSCOPY (EGD);  Surgeon: Nathan Smalls MD;  Location: [facility];  Service: Gastroenterology    WA ESOPHAGOGASTRODUODENOSCOPY TRANSORAL DIAGNOSTIC N/A 8/14/2020    Procedure: ESOPHAGOGASTRODUODENOSCOPY (EGD), PYLORIC DILATION;  Surgeon: Nathan Smalls MD;  Location: [facility] OR;  Service: Gastroenterology    VENTRICULOSTOMY Left 7/31/2022    Procedure: LEFT FRONTAL VENTRICULOSTOMY;  Surgeon: Brady Heredia MD;  Location: [facility] COLONOSCOPY W/WO BRUSH/WASH N/A 8/13/2020    Procedure: COLONOSCOPY WITH POLYPECTOMY;  Surgeon: Nathan Smalls MD;  Location: [facility];  Service: Gastroenterology      CURRENT MEDICATIONS:   acetaminophen-codeine (TYLENOL #3) 300-30 MG per tablet  desonide (DESOWEN) 0.05 % external cream  ferrous sulfate (FEROSUL) 325 (65 Fe) MG tablet  gabapentin (NEURONTIN) 100 MG capsule  levETIRAcetam (KEPPRA) 750 MG tablet  meclizine (ANTIVERT) 12.5 MG tablet  mirtazapine (REMERON) 15 MG tablet  Multiple Vitamin (MULTIVITAMIN) TABS  nortriptyline (PAMELOR) 50 MG capsule  omeprazole (PRILOSEC) 40 MG DR capsule  OXcarbazepine (TRILEPTAL) 150 MG tablet  polyvinyl alcohol (ARTIFICIAL TEARS) 1.4 % ophthalmic solution  psyllium (METAMUCIL/KONSYL) capsule  QUEtiapine (SEROQUEL) 50 MG tablet  senna-docusate (STIMULANT LAXATIVE) 8.6-50 MG tablet  topiramate (TOPAMAX) 50 MG tablet  Vitamin D3 50 mcg (2000 units) tablet      ALLERGIES:   Allergies   Allergen Reactions    Contrast [Iohexol] Rash     Noticed during 08/2020 admission. Received IV contrast on 8/5    Chocolate Headache    Contrast Dye     Penicillins Rash      FAMILY HISTORY:   Family History   Problem Relation Age of Onset    Cancer Father     Testicular cancer Grandchild 17.00    Breast Cancer Mother     Breast Cancer Sister      Breast Cancer Maternal Aunt     Breast Cancer Sister       SOCIAL HISTORY:   Social History     Socioeconomic History    Marital status:    Tobacco Use    Smoking status: Former     Packs/day: 1.00     Years: 50.00     Additional pack years: 0.00     Total pack years: 50.00     Types: Cigarettes     Quit date: 2014     Years since quittin.9    Smokeless tobacco: Never   Substance and Sexual Activity    Alcohol use: No    Drug use: No   Social History Narrative    Lives alone in the house, relay in TV dinner  grandkids help with house maintenance  Daughter lives close by.  Mara Murillo MD 2018 1:49 PM          VITALS  Patient Vitals for the past 24 hrs:   BP Temp Temp src Pulse Resp SpO2 Weight   10/25/23 1138 116/62 97.3  F (36.3  C) Oral 112 18 93 % --   10/25/23 1034 95/58 97.6  F (36.4  C) Temporal 110 18 97 % 55.2 kg (121 lb 11.1 oz)        ================================================================    PHYSICAL EXAM     VITAL SIGNS: /62   Pulse 112   Temp 97.3  F (36.3  C) (Oral)   Resp 18   Wt 55.2 kg (121 lb 11.1 oz)   SpO2 93%   BMI 19.64 kg/m     Constitutional:  Awake, no acute distress. Generally weak and in a wheel chair.   HENT:  Atraumatic, oropharynx without exudate or erythema, membranes moist  Lymph:  No adenopathy  Eyes: EOM intact, PERRL, no injection  Neck: Supple  Respiratory:  Clear to auscultation bilaterally, no wheezes or crackles   Cardiovascular:  Regular rate and rhythm, single S1 and S2   GI:  Soft, nontender, nondistended, no rebound or guarding   Musculoskeletal:  Moves all extremities, no lower extremity edema, no deformities    Skin:  Warm, dry  Neurologic:  Alert and oriented x3, no focal deficits noted       ================================================================  LAB       All pertinent labs reviewed and interpreted.   Labs Ordered and Resulted from Time of ED Arrival to Time of ED Departure - No data to display      ===============================================================  RADIOLOGY       Reviewed all pertinent imaging. Please see official radiology report.   No orders to display         ================================================================  EKG         I have independently reviewed and interpreted the EKG(s) documented above.     ================================================================  PROCEDURES         I, Luis Metz , am serving as a scribe to document services personally performed by Dr. Rainey based on my observation and the provider's statements to me. I, Donna Rainey MD attest that Luis Mtez  is acting in a scribe capacity, has observed my performance of the services and has documented them in accordance with my direction.   Donna Rainey M.D.   Emergency Medicine   [facility] EMERGENCY DEPARTMENT  [address] [place] [id number]  [phone]  Dept: [phone]      Donna Rainey MD  10/25/23 9191

## 2023-10-25 NOTE — PLAN OF CARE
Occupational Therapy Discharge Summary    Reason for therapy discharge:    Discharged to home with home therapy.    Progress towards therapy goal(s). See goals on Care Plan in [facility] electronic health record for goal details.  Goals partially met.  Barriers to achieving goals:   discharge from facility.    Therapy recommendation(s):    Continued therapy is recommended.  Rationale/Recommendations:  Continue home OT.

## 2023-10-26 ENCOUNTER — APPOINTMENT (OUTPATIENT)
Dept: PHYSICAL THERAPY | Facility: HOSPITAL | Age: 78
End: 2023-10-26
Attending: INTERNAL MEDICINE
Payer: COMMERCIAL

## 2023-10-26 ENCOUNTER — APPOINTMENT (OUTPATIENT)
Dept: OCCUPATIONAL THERAPY | Facility: HOSPITAL | Age: 78
End: 2023-10-26
Attending: INTERNAL MEDICINE
Payer: COMMERCIAL

## 2023-10-26 VITALS
OXYGEN SATURATION: 92 % | RESPIRATION RATE: 18 BRPM | SYSTOLIC BLOOD PRESSURE: 133 MMHG | WEIGHT: 121.69 LBS | HEART RATE: 101 BPM | BODY MASS INDEX: 19.64 KG/M2 | DIASTOLIC BLOOD PRESSURE: 82 MMHG | TEMPERATURE: 97.6 F

## 2023-10-26 LAB
ANION GAP SERPL CALCULATED.3IONS-SCNC: 11 MMOL/L (ref 7–15)
BUN SERPL-MCNC: 7.5 MG/DL (ref 8–23)
CALCIUM SERPL-MCNC: 9.1 MG/DL (ref 8.8–10.2)
CHLORIDE SERPL-SCNC: 105 MMOL/L (ref 98–107)
CREAT SERPL-MCNC: 0.62 MG/DL (ref 0.51–0.95)
DEPRECATED HCO3 PLAS-SCNC: 29 MMOL/L (ref 22–29)
EGFRCR SERPLBLD CKD-EPI 2021: >90 ML/MIN/1.73M2
GLUCOSE SERPL-MCNC: 73 MG/DL (ref 70–99)
HOLD SPECIMEN: NORMAL
MAGNESIUM SERPL-MCNC: 1.6 MG/DL (ref 1.7–2.3)
MAGNESIUM SERPL-MCNC: 1.6 MG/DL (ref 1.7–2.3)
POTASSIUM SERPL-SCNC: 3.8 MMOL/L (ref 3.4–5.3)
SODIUM SERPL-SCNC: 145 MMOL/L (ref 135–145)

## 2023-10-26 PROCEDURE — 99239 HOSP IP/OBS DSCHRG MGMT >30: CPT | Performed by: INTERNAL MEDICINE

## 2023-10-26 PROCEDURE — 97166 OT EVAL MOD COMPLEX 45 MIN: CPT | Mod: GO

## 2023-10-26 PROCEDURE — 97162 PT EVAL MOD COMPLEX 30 MIN: CPT | Mod: GP

## 2023-10-26 PROCEDURE — G0378 HOSPITAL OBSERVATION PER HR: HCPCS

## 2023-10-26 PROCEDURE — 83735 ASSAY OF MAGNESIUM: CPT | Performed by: INTERNAL MEDICINE

## 2023-10-26 PROCEDURE — 250N000013 HC RX MED GY IP 250 OP 250 PS 637: Performed by: INTERNAL MEDICINE

## 2023-10-26 PROCEDURE — 80048 BASIC METABOLIC PNL TOTAL CA: CPT | Performed by: INTERNAL MEDICINE

## 2023-10-26 PROCEDURE — 97535 SELF CARE MNGMENT TRAINING: CPT | Mod: GO

## 2023-10-26 PROCEDURE — 36415 COLL VENOUS BLD VENIPUNCTURE: CPT | Performed by: INTERNAL MEDICINE

## 2023-10-26 PROCEDURE — 97530 THERAPEUTIC ACTIVITIES: CPT | Mod: GP

## 2023-10-26 RX ORDER — QUETIAPINE FUMARATE 50 MG/1
50 TABLET, FILM COATED ORAL AT BEDTIME
Qty: 30 TABLET | Refills: 0 | Status: SHIPPED | OUTPATIENT
Start: 2023-10-26 | End: 2024-01-11

## 2023-10-26 RX ORDER — NALOXONE HYDROCHLORIDE 0.4 MG/ML
0.2 INJECTION, SOLUTION INTRAMUSCULAR; INTRAVENOUS; SUBCUTANEOUS
Status: DISCONTINUED | OUTPATIENT
Start: 2023-10-26 | End: 2023-10-26 | Stop reason: HOSPADM

## 2023-10-26 RX ORDER — NALOXONE HYDROCHLORIDE 0.4 MG/ML
0.4 INJECTION, SOLUTION INTRAMUSCULAR; INTRAVENOUS; SUBCUTANEOUS
Status: DISCONTINUED | OUTPATIENT
Start: 2023-10-26 | End: 2023-10-26 | Stop reason: HOSPADM

## 2023-10-26 RX ORDER — GABAPENTIN 300 MG/1
300 CAPSULE ORAL AT BEDTIME
Status: DISCONTINUED | OUTPATIENT
Start: 2023-10-26 | End: 2023-10-26 | Stop reason: HOSPADM

## 2023-10-26 RX ORDER — LOPERAMIDE HCL 2 MG
2 CAPSULE ORAL 4 TIMES DAILY PRN
Status: DISCONTINUED | OUTPATIENT
Start: 2023-10-26 | End: 2023-10-26 | Stop reason: HOSPADM

## 2023-10-26 RX ORDER — LOPERAMIDE HCL 2 MG
2 CAPSULE ORAL 4 TIMES DAILY PRN
Start: 2023-10-26 | End: 2023-11-06

## 2023-10-26 RX ORDER — MAGNESIUM OXIDE 400 MG/1
400 TABLET ORAL EVERY 4 HOURS
Status: COMPLETED | OUTPATIENT
Start: 2023-10-26 | End: 2023-10-26

## 2023-10-26 RX ORDER — GABAPENTIN 100 MG/1
100 CAPSULE ORAL
Status: DISCONTINUED | OUTPATIENT
Start: 2023-10-27 | End: 2023-10-26 | Stop reason: HOSPADM

## 2023-10-26 RX ORDER — ACETAMINOPHEN AND CODEINE PHOSPHATE 300; 30 MG/1; MG/1
1 TABLET ORAL EVERY 6 HOURS PRN
Qty: 30 TABLET | Refills: 0 | Status: SHIPPED | OUTPATIENT
Start: 2023-10-26 | End: 2023-11-06

## 2023-10-26 RX ADMIN — ACETAMINOPHEN AND CODEINE PHOSPHATE 1 TABLET: 300; 30 TABLET ORAL at 09:46

## 2023-10-26 RX ADMIN — MAGNESIUM OXIDE TAB 400 MG (241.3 MG ELEMENTAL MG) 400 MG: 400 (241.3 MG) TAB at 10:53

## 2023-10-26 RX ADMIN — MAGNESIUM OXIDE TAB 400 MG (241.3 MG ELEMENTAL MG) 400 MG: 400 (241.3 MG) TAB at 14:28

## 2023-10-26 RX ADMIN — FERROUS SULFATE TAB 325 MG (65 MG ELEMENTAL FE) 325 MG: 325 (65 FE) TAB at 09:25

## 2023-10-26 RX ADMIN — PANTOPRAZOLE SODIUM 40 MG: 40 TABLET, DELAYED RELEASE ORAL at 09:26

## 2023-10-26 RX ADMIN — Medication 2 CAPSULE: at 09:23

## 2023-10-26 RX ADMIN — GABAPENTIN 100 MG: 100 CAPSULE ORAL at 09:25

## 2023-10-26 RX ADMIN — LEVETIRACETAM 750 MG: 500 TABLET, FILM COATED ORAL at 09:25

## 2023-10-26 RX ADMIN — MECLIZINE HYDROCHLORIDE 12.5 MG: 12.5 TABLET ORAL at 09:26

## 2023-10-26 RX ADMIN — POTASSIUM CHLORIDE 40 MEQ: 1500 TABLET, EXTENDED RELEASE ORAL at 00:09

## 2023-10-26 RX ADMIN — POTASSIUM CHLORIDE 40 MEQ: 1500 TABLET, EXTENDED RELEASE ORAL at 01:56

## 2023-10-26 RX ADMIN — Medication 50 MCG: at 09:24

## 2023-10-26 ASSESSMENT — ACTIVITIES OF DAILY LIVING (ADL)
ADLS_ACUITY_SCORE: 48

## 2023-10-26 NOTE — PROGRESS NOTES
Physical Therapy Discharge Summary    Reason for therapy discharge:    Discharged to transitional care facility.    Progress towards therapy goal(s). See goals on Care Plan in [facility] electronic health record for goal details.  Goals not met.  Barriers to achieving goals:   discharge from facility.    Therapy recommendation(s):    Continued therapy is recommended.  Rationale/Recommendations:  TCU.

## 2023-10-26 NOTE — PROGRESS NOTES
10/26/23 1000   Appointment Info   Signing Clinician's Name / Credentials (OT) Antonia Torres, OTR/L   Quick Adds   Quick Adds Certification   Living Environment   People in Home alone   Current Living Arrangements house   Home Accessibility no concerns   Transportation Anticipated family or friend will provide   Living Environment Comments Pt has small BR w/ step over tub w/ multiple GB and SC, raised toilet and GB by toilet   Self-Care   Equipment Currently Used at Home wheelchair, manual;walker, rolling;shower chair;grab bar, tub/shower;grab bar, toilet;raised toilet seat   General Information   Onset of Illness/Injury or Date of Surgery 10/25/23   Referring Physician Johnathon Bal, DO   Patient/Family Therapy Goal Statement (OT) Go to TCU   Additional Occupational Profile Info/Pertinent History of Current Problem Julisa Chaney is a 78 year old female with PMH of severe malnutrition, 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 difficile infection, chronic pain with opioid dependence, trigeminal neuralgia, PUD who was admitted to [facility] 10/22-10/24/2023 with norovirus infection, electrolyte imbalance and weakness and declined TCU now presents with failure to thrive at home   Existing Precautions/Restrictions fall   Cognitive Status Examination   Orientation Status orientation to person, place and time   Range of Motion Comprehensive   Comment, General Range of Motion Decreased shoulder ROM, not new   Strength Comprehensive (MMT)   Comment, General Manual Muscle Testing (MMT) Assessment generalized weakness   Bed Mobility   Bed Mobility supine-sit   Supine-Sit [place] (Bed Mobility) minimum assist (75% patient effort)   Transfers   Transfers sit-stand transfer   Transfer Comments Patient took side steps to HOB with Min A and FWW   Sit-Stand Transfer   Sit-Stand [place] (Transfers) minimum assist (75% patient effort)    Sit/Stand Transfer Comments Patient will need assistance with toileting as well   Balance   Balance Comments Static standing with Min A and FWW   Activities of Daily Living   BADL Assessment/Intervention lower body dressing   Lower Body Dressing Assessment/Training   [place] Level (Lower Body Dressing) maximum assist (25% patient effort)   Clinical Impression   Criteria for Skilled Therapeutic Interventions Met (OT) Yes, treatment indicated   OT Diagnosis Decreased ADL independence   Influenced by the following impairments Fall   OT Problem List-Impairments impacting ADL problems related to;activity tolerance impaired;balance;mobility;strength;postural control   Assessment of Occupational Performance 3-5 Performance Deficits   Identified Performance Deficits Trsfs, endurance, bed mobility, LE dressing   Planned Therapy Interventions (OT) ADL retraining;balance training;bed mobility training;strengthening;transfer training;progressive activity/exercise   Clinical Decision Making Complexity (OT) detailed assessment/moderate complexity   Risk & Benefits of therapy have been explained care plan/treatment goals reviewed;evaluation/treatment results reviewed   OT Total Evaluation Time   OT Eval, Moderate Complexity Minutes (43676) 20   Therapy Certification   Medical Diagnosis failure to thrive   Start of Care Date 10/26/23   Certification date from 10/26/23   Certification date to 11/02/23   OT Goals   Therapy Frequency (OT) Daily   OT Predicted Duration/Target Date for Goal Attainment 11/02/23   OT Goals Transfers;Toilet Transfer/Toileting;Lower Body Dressing   OT: Lower Body Dressing Supervision/stand-by assist   OT: Transfer Supervision/stand-by assist   OT: Toilet Transfer/Toileting Supervision/stand-by assist   Self-Care/Home Management   Self-Care/Home Mgmt/ADL, Compensatory, Meal Prep Minutes (84765) 13   Treatment Detail/Skilled Intervention Eval completed, treatment initiated. Patient repeated STS from  bed with Min A and VC for hand placement. Patient ambulated around bed with FWW and Min A, unsteady, increasing fatigue wtih more activity. Patient left in chair with call light, OT notified RN.   OT Discharge Planning   OT Plan Trsfs, g/h standing, bed mob   OT Discharge Recommendation (DC Rec) Transitional Care Facility   OT Rationale for DC Rec Patient requiring assistance for all ADLs and mobility and patient previously independent with ADLs. Recommend TCU to improve ADL independence   OT Brief overview of current status Min-Mod A for trsfs/bed mobility   Total Session Time   Timed Code Treatment Minutes 13   Total Session Time (sum of timed and untimed services) 33      [facility]  OUTPATIENT OCCUPATIONAL THERAPY  EVALUATION  PLAN OF TREATMENT FOR OUTPATIENT REHABILITATION  (COMPLETE FOR INITIAL CLAIMS ONLY)  Patient's Last Name, First Name, M.I.  YOB: 1945  Julisa Chaney                          Provider's Name  [facility] Medical Record No.  [id number]                             Onset Date:  10/25/23   Start of Care Date:  10/26/23   Type:     ___PT   _X_OT   ___SLP Medical Diagnosis:  failure to thrive                    OT Diagnosis:  Decreased ADL independence Visits from SOC:  1     See note for plan of treatment, functional goals and certification details    I CERTIFY THE NEED FOR THESE SERVICES FURNISHED UNDER        THIS PLAN OF TREATMENT AND WHILE UNDER MY CARE     (Physician co-signature of this document indicates review and certification of the therapy plan).

## 2023-10-26 NOTE — PLAN OF CARE
Occupational Therapy Discharge Summary    Reason for therapy discharge:    Discharged to transitional care facility.    Progress towards therapy goal(s). See goals on Care Plan in [facility] electronic health record for goal details.  Goals partially met.  Barriers to achieving goals:   discharge from facility.    Therapy recommendation(s):    Continued therapy is recommended.  Rationale/Recommendations:  to improve ADL independence.

## 2023-10-26 NOTE — PLAN OF CARE
Problem: Adult Inpatient Plan of Care  Goal: Plan of Care Review  Description: The Plan of Care Review/Shift note should be completed every shift.  The Outcome Evaluation is a brief statement about your assessment that the patient is improving, declining, or no change.  This information will be displayed automatically on your shift  note.  Outcome: Progressing     Problem: Adult Inpatient Plan of Care  Goal: Optimal Comfort and Wellbeing  Outcome: Progressing     Problem: Adult Inpatient Plan of Care  Goal: Readiness for Transition of Care  Outcome: Progressing   Goal Outcome Evaluation:       Patient stable through the night, no complaints of pain, slept through most of the night, pending TCU placement.

## 2023-10-26 NOTE — PLAN OF CARE
Goal Outcome Evaluation:      Plan of Care Reviewed With: patient    Overall Patient Progress: improving    Patient alert and oriented x4. Vital signs are stable. Patient on Magnesium protocol - needed replaceing today - 1.6. Patient is incontinent of urine. Contact precautions maintained. Patient will be transferred to [facility] by daughter today.

## 2023-10-26 NOTE — DISCHARGE SUMMARY
[facility]  Hospitalist Discharge Summary      Date of Admission:  10/25/2023  Date of Discharge:  10/26/2023  Discharging Provider: Johnathon Bal,   Discharge Service: Hospitalist Service        Follow-ups Needed After Discharge   Follow-up Appointments     Follow Up and recommended labs and tests      Follow up with [facility] physician.  The following labs/tests are   recommended: K and Mag in 1-2 days to ensure stability.            Unresulted Labs Ordered in the Past 30 Days of this Admission       No orders found for last 31 day(s).        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 difficile infection,depression, trigeminal neuralgia admitted originally on 10/22/2023 with diarrhea, poor oral intake and generalized body weakness secondary to norovirus.   Discharged 10/24 as she declined TCU and now presents 10/25/23 with failure to thrive requiring TCU placement.         Recent norovirus infection 10/16/2023  Failure to thrive  Generalized weakness  -- await TCU placement  -- continue psyllium capsules twice daily and Imodium        Hypokalemia: replaced, recheck at TCU        Hypomagnesemia: replaced, recheck at TCU        Left lateral ventricular/frontal lobe abscess status post left frontal ventriculostomy on 7/31/2022  Wheelchair-bound  Seizure disorder  Neuropathy  Trigeminal neuralgia   --Continue PTA gabapentin, oxcarbazepine, topiramate and Keppra, T3        Mood disorder:   -- continue PTA Seroquel, mirtazapine and nortriptyline       Consultations This Hospital Stay   CARE MANAGEMENT / SOCIAL WORK IP CONSULT  PHYSICAL THERAPY ADULT IP  CONSULT  OCCUPATIONAL THERAPY ADULT IP CONSULT  PHYSICAL THERAPY ADULT IP CONSULT  OCCUPATIONAL THERAPY ADULT IP CONSULT    Code Status   Full Code    Time Spent on this Encounter   I, Johnathon Bal DO, personally saw the patient today and spent greater than 30 minutes discharging this patient.       Johnathon Bal DO  [facility] EMERGENCY DEPARTMENT  [facility] [facility] [address]  Phone: [phone]  ______________________________________________________________________    Physical Exam   Vital Signs: Temp: 97.6  F (36.4  C) Temp src: Oral BP: 133/82 Pulse: 101   Resp: 18 SpO2: 92 % O2 Device: None (Room air)    Weight: 121 lbs 11.1 oz    General: NAD  HEENT: Without congestion or inflammation  RESPIRATORY: Respirations nonlabored  CARDIOVASCULAR: No le edema bilat.  NEUROLOGIC: No focal arm or leg weakness, speech is clear    Primary Care Physician   Mara Murillo    Discharge Orders      General info for SNF    Length of Stay Estimate: Short Term Care: Estimated # of Days <30  Condition at Discharge: Stable  Level of care:skilled   Rehabilitation Potential: Excellent  Admission H&P remains valid and up-to-date: Yes  Recent Chemotherapy: N/A  Use Nursing Home Standing Orders: Yes     Mantoux instructions    Give two-step Mantoux (PPD) Per Facility Policy Yes     Follow Up and recommended labs and tests    Follow up with [facility] physician.  The following labs/tests are recommended: K and Mag in 1-2 days to ensure stability.     Activity - Up with nursing assistance     Reason for your hospital stay    Failure to thrive     Full Code     Physical Therapy Adult Consult    Evaluate and treat as clinically indicated.    Reason:  Deconditioning     Occupational Therapy Adult Consult    Evaluate and treat as clinically indicated.    Reason:  Deconditioning     Diet    Follow this diet upon discharge: Orders Placed This Encounter      Combination Diet Regular Diet  Adult     \    Discharge Medications   Current Discharge Medication List        START taking these medications    Details   loperamide (IMODIUM) 2 MG capsule Take 1 capsule (2 mg) by mouth 4 times daily as needed for diarrhea    Associated Diagnoses: Diarrhea, unspecified type           CONTINUE these medications which have NOT CHANGED    Details   acetaminophen-codeine (TYLENOL #3) 300-30 MG per tablet Take 1 tablet by mouth every 6 hours as needed for severe pain  Qty: 120 tablet, Refills: 0    Associated Diagnoses: Trigeminal neuralgia; Chronic pain syndrome      desonide (DESOWEN) 0.05 % external cream Apply to affected area 2 times daily  Qty: 60 g, Refills: 0    Associated Diagnoses: Atopic dermatitis, mild      ferrous sulfate (FEROSUL) 325 (65 Fe) MG tablet Take 1 tablet (325 mg) by mouth daily (with breakfast)  Qty: 30 tablet, Refills: 1    Associated Diagnoses: Iron deficiency anemia due to chronic blood loss      gabapentin (NEURONTIN) 100 MG capsule Take 1 capsule (100 mg) by mouth daily (with breakfast) AND 3 capsules (300 mg) At Bedtime.  Qty: 120 capsule, Refills: 0    Associated Diagnoses: Trigeminal neuralgia; Chronic pain syndrome      levETIRAcetam (KEPPRA) 750 MG tablet Take 1 tablet (750 mg) by mouth 2 times daily  Qty: 60 tablet, Refills: 0    Associated Diagnoses: Persistent insomnia      meclizine (ANTIVERT) 12.5 MG tablet Take 1 tablet (12.5 mg) by mouth 2 times daily  Qty: 60 tablet, Refills: 1    Associated Diagnoses: Dizziness      mirtazapine (REMERON) 15 MG tablet Take 1 tablet (15 mg) by mouth at bedtime  Qty: 30 tablet, Refills: 0    Associated Diagnoses: Current mild episode of major depressive disorder without prior episode (H24)      Multiple Vitamin (MULTIVITAMIN) TABS Take 1 tablet by mouth daily  Qty: 100 tablet, Refills: 3    Associated Diagnoses: Trigeminal nerve disorder      nortriptyline (PAMELOR) 50 MG capsule Take 1 capsule (50 mg) by mouth At Bedtime  Qty: 90 capsule,  Refills: 3    Associated Diagnoses: Trigeminal neuralgia; Chronic pain syndrome      omeprazole (PRILOSEC) 40 MG DR capsule TAKE 1 CAPSULE(40 MG) BY MOUTH DAILY  Qty: 90 capsule, Refills: 3    Associated Diagnoses: Gastroesophageal reflux disease without esophagitis      OXcarbazepine (TRILEPTAL) 150 MG tablet TAKE 3 TABLETS(450 MG) BY MOUTH DAILY  Qty: 270 tablet, Refills: 1    Comments: **Patient requests 90 days supply**  Associated Diagnoses: Facial neuralgia      polyvinyl alcohol (ARTIFICIAL TEARS) 1.4 % ophthalmic solution Place 1 drop into both eyes every hour as needed for dry eyes  Qty: 60 mL, Refills: 0    Associated Diagnoses: Lung infiltrate; Infection due to 2019 novel coronavirus; Sepsis, due to unspecified organism, unspecified whether acute organ dysfunction present (H); Pyogenic brain abscess; Encephalitis; ORTIZ (acute kidney injury) (H24); Pyloric ulcer, chronic; Current mild episode of major depressive disorder without prior episode (H24); Abnormal chest CT; Chronic pain syndrome; Hypercalcemia; Disorder of bone and cartilage; Pure hypercholesterolemia; Other migraine without status migrainosus, not intractable; Trigeminal neuralgia; Counseling regarding advanced directives and goals of care; Controlled substance agreement signed      psyllium (METAMUCIL/KONSYL) capsule Take 2 capsules by mouth 2 times daily  Qty: 360 capsule, Refills: 3    Comments: Pharmacy to dispense capsule size that is available.  Associated Diagnoses: Diarrhea, unspecified type      QUEtiapine (SEROQUEL) 50 MG tablet Take 1 tablet (50 mg) by mouth At Bedtime  Qty: 30 tablet, Refills: 0    Associated Diagnoses: Anxiety      senna-docusate (STIMULANT LAXATIVE) 8.6-50 MG tablet TAKE 1 TABLET BY MOUTH AT BEDTIME  Qty: 90 tablet, Refills: 3    Associated Diagnoses: Constipation, unspecified constipation type      topiramate (TOPAMAX) 50 MG tablet Take 1 tablet (50 mg) by mouth at bedtime  Qty: 30 tablet, Refills: 0    Associated  Diagnoses: Intractable chronic migraine without aura and without status migrainosus      Vitamin D3 50 mcg (2000 units) tablet Take 1 tablet (50 mcg) by mouth daily  Qty: 90 tablet, Refills: 4    Associated Diagnoses: Vitamin D deficiency           Allergies   Allergies   Allergen Reactions    Contrast [Iohexol] Rash     Noticed during 08/2020 admission. Received IV contrast on 8/5    Chocolate Headache    Contrast Dye     Penicillins Rash

## 2023-10-26 NOTE — UTILIZATION REVIEW
Concurrent stay review; Secondary Review Determination - [facility]    Under the authority of the Utilization Management Committee, the utilization review process indicated a secondary review on the above patient.  The review outcome is based on review of the medical records, discussions with staff, and applying clinical experience noted on the date of the review.        (x) Observation/outpatient Status Appropriate - Concurrent stay review    RATIONALE FOR DETERMINATION:     Ms. Gregorio is a 77 yo female who presented to the ED with weakness and inability to care for herself.  Recent admission for acute norovirus infection; TCU recommended but she declined at time of discharge.  Vitals stable this am, not on IVF and tolerating a regular diet.  Remaining in the hospital for TCU placement and safe discharge planning.    Patient delayed discharge is related to disposition, there is no medical necessity for inpatient admission at the time of this review. If there is a change in patient status, please resend for review.    The information on this document is developed by the utilization review team in order for the business office to ensure compliance.  This only denotes the appropriateness of proper admission status and does not reflect the quality of care rendered.       The definitions of Inpatient Status and Observation Status used in making the determination above are those provided in the CMS Coverage Manual, Chapter 1 and Chapter 6, section 70.4.       Sincerely,    Naomy Wright, DO

## 2023-10-26 NOTE — PROGRESS NOTES
Discharge packet for TCU along with prescriptions given to Daughter. Daughter and pt. Also given a copy of AVS. Daughter transporting pt. To TCU.     Shelia Francis RN

## 2023-10-26 NOTE — PROGRESS NOTES
"   10/26/23 1135   Appointment Info   Signing Clinician's Name / Credentials (PT) Naomi Ramos, PT, DPT   Quick Adds   Quick Adds Certification   Living Environment   People in Home alone   Current Living Arrangements house   Home Accessibility no concerns   Self-Care   Current Activity Tolerance fair   Equipment Currently Used at Home walker, rolling;wheelchair, manual   Fall history within last six months yes   Number of times patient has fallen within last six months 1   General Information   Onset of Illness/Injury or Date of Surgery 10/25/23   Referring Physician Johnathon Bal DO   Patient/Family Therapy Goals Statement (PT) Return to home   Pertinent History of Current Problem (include personal factors and/or comorbidities that impact the POC) Per Chart Review - \"78 year old female with PMH of severe malnutrition, 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 difficile infection, chronic pain with opioid dependence, trigeminal neuralgia, PUD who was admitted to [facility] 10/22-10/24/2023 with norovirus infection, electrolyte imbalance and weakness and declined TCU now presents with failure to thrive at home\"   Existing Precautions/Restrictions no known precautions/restrictions   Weight-Bearing Status - LLE full weight-bearing   Weight-Bearing Status - RLE full weight-bearing   Range of Motion (ROM)   Range of Motion ROM deficits secondary to weakness   Strength (Manual Muscle Testing)   Strength (Manual Muscle Testing) Deficits observed during functional mobility   Bed Mobility   Bed Mobility supine-sit   Supine-Sit [place] (Bed Mobility) supervision;verbal cues;contact guard   Transfers   Transfers sit-stand transfer   Maintains Weight-bearing Status (Transfers) able to maintain   Sit-Stand Transfer   Sit-Stand [place] (Transfers) supervision;verbal cues;minimum assist (75% patient effort)   Gait/Stairs (Locomotion) "   Comment, (Gait/Stairs) pt unable to complete functional gait at initial evaluation   Clinical Impression   Criteria for Skilled Therapeutic Intervention Yes, treatment indicated   PT Diagnosis (PT) impaired functional mobility   Influenced by the following impairments weakness, decreased ROM, impaired balance,   Functional limitations due to impairments gait, transfers   Clinical Presentation (PT Evaluation Complexity) stable   Clinical Presentation Rationale pt presents as medically diagnosed   Clinical Decision Making (Complexity) moderate complexity   Planned Therapy Interventions (PT) balance training;gait training;home exercise program;ROM (range of motion);strengthening;transfer training;wheelchair management/propulsion training   Risk & Benefits of therapy have been explained evaluation/treatment results reviewed;patient   PT Total Evaluation Time   PT Eval, Moderate Complexity Minutes (09242) 10   Therapy Certification   Start of care date 10/26/23   Certification date from 10/26/23   Certification date to 11/02/23   Medical Diagnosis 78 year old female with PMH of severe malnutrition, 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 difficile infection, chronic pain with opioid dependence, trigeminal neuralgia, PUD who was admitted to [facility] 10/22-10/24/2023 with norovirus infection, electrolyte imbalance and weakness and declined TCU now presents with failure to thrive at home   Physical Therapy Goals   PT Frequency Daily   PT Predicted Duration/Target Date for Goal Attainment 11/01/23   PT Goals Transfers;Gait   PT: Transfers Sit to/from stand;Bed to/from chair  (CGA)   PT: Gait Assistive device;5 feet;Rolling walker;Minimal assist   Therapeutic Activity   Therapeutic Activities: dynamic activities to improve functional performance Minutes (36045) 10   Symptoms Noted During/After Treatment Fatigue   Treatment Detail/Skilled Intervention  stand pivot Transfer to/from chair from/to bed x1: Cueing for safety/steps forward & side/hand placement on stable surface, Min A x1; Sit to/from stand x1 from lower surface: cueing for safety/hand placement, Mod A x1; Sit to supine: cueing fro safety/technqiue, CGA - dependent boost for repositioning in bed; call light within reach at end of session   PT Discharge Planning   PT Plan Stand pivot transfers; sit to stand transfers; LE strengthening   PT Discharge Recommendation (DC Rec) Transitional Care Facility   PT Rationale for DC Rec TCU to improve strength, tolerance for activity, balance; pt is not at baseline mobility and at an increased risk for falls; requires Mod A x1 for sit to stand from chair   PT Brief overview of current status Mod A x1 sit stand from lower surface; Min A for stand pivot transfer   PT Equipment Needed at Discharge walker, rolling;wheelchair   Total Session Time   Timed Code Treatment Minutes 10   Total Session Time (sum of timed and untimed services) 20     [facility]  OUTPATIENT PHYSICAL THERAPY EVALUATION  PLAN OF TREATMENT FOR OUTPATIENT REHABILITATION  (COMPLETE FOR INITIAL CLAIMS ONLY)  Patient's Last Name, First Name, M.I.  YOB: 1945  Julisa Chaney                        Provider's Name  [facility] Medical Record No.  [id number]                             Onset Date:  10/25/23   Start of Care Date:  10/26/23   Type:     _X_PT   ___OT   ___SLP Medical Diagnosis:  78 year old female with PMH of severe malnutrition, 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 difficile infection, chronic pain with opioid dependence, trigeminal neuralgia, PUD who was admitted to [facility] 10/22-10/24/2023 with norovirus infection, electrolyte imbalance and weakness and declined TCU now presents with failure to thrive at home              PT Diagnosis:   impaired functional mobility Visits from SOC:  1     See note for plan of treatment, functional goals and certification details    I CERTIFY THE NEED FOR THESE SERVICES FURNISHED UNDER        THIS PLAN OF TREATMENT AND WHILE UNDER MY CARE     (Physician co-signature of this document indicates review and certification of the therapy plan).

## 2023-10-26 NOTE — PROGRESS NOTES
"Care Management Follow Up    Length of Stay (days): 0    Expected Discharge Date: 10/26/2023 or 10/27/23, we are just waiting for [employer] approval for the TCU bed.     Concerns to be Addressed: discharge planning     Patient plan of care discussed at interdisciplinary rounds: No    Anticipated Discharge Disposition:  to [facility] TCU already holding a bed for her, but awaiting [employer] auth.     Anticipated Discharge Services:  TCU, PT, OT  Anticipated Discharge DME:  nothing new    Patient/family educated on Medicare website which has current facility and service quality ratings:  yes daughter Alissa  Education Provided on the Discharge Plan:  Yes Julisa and daughter Alissa  Patient/Family in Agreement with the Plan:  Yes    Referrals Placed by CM/SW:  Yes  Private pay costs discussed: transportation costs discussed with Alissa daughter    Additional Information:  Julisa updated about discharge plan and I called and notified Alissa daughter about discharge plan. She is aware we are waiting for [employer] auth for her to go to TCU. She is \"glad that [facility] since she has been there before.\"     She also states, \"I am worried because I am going out of town tomorrow 10/27/23 and then my 21 year old daughter will be in town, but I don't want her to have to do things like transporting Julisa. I am still available by phone. So I can transport if discharge is before 1000 10/27/23; otherwise will need [facility] w/c transport. And I am aware that I have to private pay for the transportation\".    I called Catherine intake staff at [facility] and updated that we are still awaiting [employer]. She states, \"I will continue to hold the TCU bed for Julisa. We can take her today depending on timing or tomorrow\".    I spoke to OT who has just seen the patient and will be placing her note. Also she is going to see if PT can move up the time to see the patient so [facility].    Ning Dougherty RN

## 2023-10-26 NOTE — PROGRESS NOTES
Care Management Discharge Note    Discharge Date: 10/26/2023       Discharge Disposition: Transitional Care    Discharge Services:      Discharge DME:      Discharge Transportation: family or friend will provide    Private pay costs discussed: Not applicable    Does the patient's insurance plan have a 3 day qualifying hospital stay waiver?  Yes     Which insurance plan 3 day waiver is available? Alternative insurance waiver    Will the waiver be used for post-acute placement? Yes    PAS Confirmation Code: [id number]  Patient/family educated on Medicare website which has current facility and service quality ratings:      Education Provided on the Discharge Plan:    Persons Notified of Discharge Plans: Pt, daughter and admissons at [facility]  Patient/Family in Agreement with the Plan:  yes    Handoff Referral Completed: No    Additional Information:  [employer] insurance auth obtained #[id number] for pt to discharge to TCU today.  Confirmed with [facility] that bed available for pt today.  Spoke to pt's daughter, Alissa, and she available to transport pt at 4pm.  PAS done.    WILEY Ojeda

## 2023-10-26 NOTE — PROGRESS NOTES
"PRIMARY DIAGNOSIS: \"GENERIC\" NURSING  OUTPATIENT/OBSERVATION GOALS TO BE MET BEFORE DISCHARGE:  ADLs back to baseline: No    Activity and level of assistance: Up with maximum assistance. Consider SW and/or PT evaluation.     Pain status: Improved-controlled with oral pain medications.    Return to near baseline physical activity: No     Discharge Planner Nurse   Safe discharge environment identified: No  Barriers to discharge: Yes       Entered by: Julisa Holman RN 10/26/2023 1:15 AM     Please review provider order for any additional goals.   Nurse to notify provider when observation goals have been met and patient is ready for discharge.    Pt A&Ox4, c/o having chronic pain on face, PRN Tylenol #3 given w/ relief. Has generalized weakness. Pt was not OOB this shift, but states uses w/c at baseline. Incontinent of bowel and bladder. Had 1 BM this shift. Potassium was 2.4, MD notified and ordered replacement.   "

## 2023-10-26 NOTE — PROGRESS NOTES
"PRIMARY DIAGNOSIS: \"GENERIC\" NURSING  OUTPATIENT/OBSERVATION GOALS TO BE MET BEFORE DISCHARGE:  ADLs back to baseline: No    Activity and level of assistance: Up with maximum assistance. Consider SW and/or PT evaluation.     Pain status: Improved-controlled with oral pain medications.    Return to near baseline physical activity: No     Discharge Planner Nurse   Safe discharge environment identified: No  Barriers to discharge: Yes       Entered by: Julisa Holman RN 10/25/2023 11:40 PM     Please review provider order for any additional goals.   Nurse to notify provider when observation goals have been met and patient is ready for discharge.  "

## 2023-10-27 ENCOUNTER — PATIENT OUTREACH (OUTPATIENT)
Dept: CARE COORDINATION | Facility: CLINIC | Age: 78
End: 2023-10-27
Payer: COMMERCIAL

## 2023-10-27 ENCOUNTER — TRANSITIONAL CARE UNIT VISIT (OUTPATIENT)
Dept: GERIATRICS | Facility: CLINIC | Age: 78
End: 2023-10-27
Payer: COMMERCIAL

## 2023-10-27 VITALS
OXYGEN SATURATION: 92 % | TEMPERATURE: 97.2 F | BODY MASS INDEX: 19.53 KG/M2 | SYSTOLIC BLOOD PRESSURE: 113 MMHG | DIASTOLIC BLOOD PRESSURE: 68 MMHG | HEART RATE: 78 BPM | WEIGHT: 121 LBS | RESPIRATION RATE: 14 BRPM

## 2023-10-27 DIAGNOSIS — R53.81 PHYSICAL DECONDITIONING: Primary | ICD-10-CM

## 2023-10-27 DIAGNOSIS — F33.0 MILD RECURRENT MAJOR DEPRESSION (H): ICD-10-CM

## 2023-10-27 DIAGNOSIS — G89.4 CHRONIC PAIN SYNDROME: ICD-10-CM

## 2023-10-27 DIAGNOSIS — G50.0 TRIGEMINAL NEURALGIA: ICD-10-CM

## 2023-10-27 DIAGNOSIS — G40.909 SEIZURE DISORDER (H): ICD-10-CM

## 2023-10-27 PROCEDURE — 99309 SBSQ NF CARE MODERATE MDM 30: CPT | Performed by: NURSE PRACTITIONER

## 2023-10-27 NOTE — LETTER
Hand-off  for Care Coordination    Att: Discharge Planner:  Please if able, fax or call the number listed below when the below patient is discharged from your facility.  Clinic Care Coordination from patient's Primary Care Clinic will outreach to patient upon discharge to assist with TCU transition/discharge.    Thank you      Patient Name:   Julisa Chaney  Patient :     1945  Patient PCP:     Mara Murillo MD    Patient Primary Clinic:   [address] [place] [id number]  D/C Facility: _____________________________________________   TCU Contact Info for questions: ___________________________  D/C Date:  ______________________________________________  Follow-up Apt with PCP after TCU D/C:   ____________________  Other Follow-up Apt s:      _________________________________________  Additional information (concerns, and Home Care, ect ):   __________________________________________________________________  __________________________________________________________________  Care Coordinator to Contact at [place]  Fax to: [phone]  Attn:  Aleshia Moraes RN Clinic Care Coordination [facility]  Phone: [phone]

## 2023-10-27 NOTE — PROGRESS NOTES
"Clinic Care Coordination Contact  Care Coordination Transition Communication    Clinical Data: Patient was hospitalized at [facility] from 10/25 to 10/26 with diagnosis of dehydration and failure to thrive in adult.     Assessment: Patient has transitioned to TCU/ARU for short term rehabilitation:    Facility Name: [facility]   Transition Communication:  Notified facility of Primary Care- Care Coordination support via Epic fax.    Plan: Care Coordinator will await notification from facility staff informing of patient's discharge plans/needs. Care Coordinator will review chart and outreach to facility staff every 4 weeks and as needed.     Per hospital chart review: Julisa lives in a house alone. Daughter Alissa helps with some ADLs and all IADLs. \"My daughter helps with stand by assist when I shower and then with anything else I need. I can usually dress myself, but not now. My daughter and grandkids help with anything I would need. They stop by for meals and medications 3 times a day to help me\".     Julisa was admitted 10/22/23 - 10/24/23. It was recommended by PT and OT that she go to TCU upon discharge. She refused TCU placement and wanted to go home with Home Care that was already helping her and her daughter's help. She was not home even 24 hours before returning to ER because of \"inability to care for herself and help from daughter isn't enough. I need to go to TCU\".     Has [facility] RN, PT, OT services from the [facility].     \"I mostly use the wheelchair all the time. I do use the walker for short distances like into the bathroom at home\".     "

## 2023-10-27 NOTE — PROGRESS NOTES
[facility] GERIATRICS    PRIMARY CARE PROVIDER AND CLINIC:  Mara Murillo MD, [address] [facility] / [facility] [address]  Chief Complaint   Patient presents with    Hospital F/U      [facility] Medical Record Number:  [id number]  Place of Service where encounter took place:  [facility] (U) [[id number]]    Julisa Chaney  is a 78 year old  (1945), admitted to the above facility from  [facility]. Hospital stay 10/25/23 through 10/26/23. 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 She was hospitalized 10/22 to 10/24 for norovirus. She chose to go home instead of U. She presented back to the ED due to FTT at home.    HPI:    Patient says she did get up and walk in her room with therapy today. She is starting to feel better. Her goal is to return home. No diarrhea today. Appetite is ok.     CODE STATUS/ADVANCE DIRECTIVES DISCUSSION:  Full Code    ALLERGIES:   Allergies   Allergen Reactions    Contrast [Iohexol] Rash     Noticed during 08/2020 admission. Received IV contrast on 8/5    Chocolate Headache    Contrast Dye     Penicillins Rash      PAST MEDICAL HISTORY:   Past Medical History:   Diagnosis Date    Aspiration pneumonia (H) 02/2022    Depression     High cholesterol     Migraines     Pyloric ulcer, chronic     Sepsis (H) 08/10/2020    Trigeminal neuralgia       PAST SURGICAL HISTORY:   has a past surgical history that includes Colonoscopy w/wo Brush **Performed** (N/A, 8/13/2020); Pr Esophagogastroduodenoscopy Transoral Diagnostic (N/A, 8/13/2020); Hysterectomy; Pr Esophagogastroduodenoscopy Transoral Diagnostic (N/A, 8/14/2020); PICC/Midline Placement (7/22/2022); Ventriculostomy (Left, 7/31/2022); and IR Gastrostomy Tube Percutaneous Plcmnt (8/16/2022).  FAMILY HISTORY: family history includes Breast Cancer in her maternal aunt, mother, sister, and sister; Cancer in her  father; Testicular cancer (age of onset: 17.00) in her grandchild.  SOCIAL HISTORY:   reports that she quit smoking about 8 years ago. She has a 50.00 pack-year smoking history. She has never used smokeless tobacco. She reports that she does not drink alcohol and does not use drugs.  Patient's living condition: lives alone    Post Discharge Medication Reconciliation Status:   MED REC REQUIRED  Post Medication Reconciliation Status:  Discharge medications reconciled, continue medications without change       Current Outpatient Medications   Medication Sig    acetaminophen-codeine (TYLENOL #3) 300-30 MG per tablet Take 1 tablet by mouth every 6 hours as needed for severe pain    desonide (DESOWEN) 0.05 % external cream Apply to affected area 2 times daily    ferrous sulfate (FEROSUL) 325 (65 Fe) MG tablet Take 1 tablet (325 mg) by mouth daily (with breakfast)    gabapentin (NEURONTIN) 100 MG capsule Take 1 capsule (100 mg) by mouth daily (with breakfast) AND 3 capsules (300 mg) At Bedtime.    levETIRAcetam (KEPPRA) 750 MG tablet Take 1 tablet (750 mg) by mouth 2 times daily    loperamide (IMODIUM) 2 MG capsule Take 1 capsule (2 mg) by mouth 4 times daily as needed for diarrhea    meclizine (ANTIVERT) 12.5 MG tablet Take 1 tablet (12.5 mg) by mouth 2 times daily    mirtazapine (REMERON) 15 MG tablet Take 1 tablet (15 mg) by mouth at bedtime    Multiple Vitamin (MULTIVITAMIN) TABS Take 1 tablet by mouth daily    nortriptyline (PAMELOR) 50 MG capsule Take 1 capsule (50 mg) by mouth At Bedtime    omeprazole (PRILOSEC) 40 MG DR capsule TAKE 1 CAPSULE(40 MG) BY MOUTH DAILY    OXcarbazepine (TRILEPTAL) 150 MG tablet TAKE 3 TABLETS(450 MG) BY MOUTH DAILY    polyvinyl alcohol (ARTIFICIAL TEARS) 1.4 % ophthalmic solution Place 1 drop into both eyes every hour as needed for dry eyes    psyllium (METAMUCIL/KONSYL) capsule Take 2 capsules by mouth 2 times daily    QUEtiapine (SEROQUEL) 50 MG tablet Take 1 tablet (50 mg) by mouth  at bedtime    senna-docusate (STIMULANT LAXATIVE) 8.6-50 MG tablet TAKE 1 TABLET BY MOUTH AT BEDTIME    topiramate (TOPAMAX) 50 MG tablet Take 1 tablet (50 mg) by mouth at bedtime    Vitamin D3 50 mcg (2000 units) tablet Take 1 tablet (50 mcg) by mouth daily     No current facility-administered medications for this visit.       ROS:  4 point ROS including Respiratory, CV, GI and , other than that noted in the HPI,  is negative    Vitals:  /68   Pulse 78   Temp 97.2  F (36.2  C)   Resp 14   Wt 54.9 kg (121 lb)   SpO2 92%   BMI 19.53 kg/m    Exam:  GENERAL APPEARANCE:  Alert, in no distress  ENT:  Mouth and posterior oropharynx normal, moist mucous membranes  EYES:  EOM normal, conjunctiva and lids normal  RESP:  no respiratory distress  CV:  no edema  M/S:   severe kyphosis  SKIN:  Inspection of skin and subcutaneous tissue baseline  PSYCH:  oriented X 3, memory impaired     Lab/Diagnostic data:  Recent labs in [facility] reviewed by me today.     ASSESSMENT/PLAN:  (R53.81) Physical deconditioning  (primary encounter diagnosis)  Comment: Due to acute illness  Plan: PT/OT eval and treat, discharge planning per their recommendations.    (G40.909) Seizure disorder (H)  Comment: Chronic condition being managed with medications.  Plan: Continue current POC with no changes at this time and adjustments as needed.    (G50.0) Trigeminal neuralgia  (G89.4) Chronic pain syndrome  Comment: No complaints today.   Plan: Continue current POC with no changes at this time and adjustments as needed.    (F33.0) Mild recurrent major depression (H24)  Comment: Chronic condition being managed with medications.  Plan: Continue current POC with no changes at this time and adjustments as needed.      Electronically signed by:  DANIELLE Villanueva CNP

## 2023-10-27 NOTE — LETTER
10/27/2023        RE: Julisa Chaney  [address]  [place] [address]        [facility] GERIATRICS    PRIMARY CARE PROVIDER AND CLINIC:  Mara Murillo MD, [address] [facility] / [place] [address]  Chief Complaint   Patient presents with     Hospital F/U      [facility] Medical Record Number:  [id number]  Place of Service where encounter took place:  [facility] (U) [[id number]]    Julisa Chaney  is a 78 year old  (1945), admitted to the above facility from  [facility]. Hospital stay 10/25/23 through 10/26/23. 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 She was hospitalized 10/22 to 10/24 for norovirus. She chose to go home instead of TCU. She presented back to the ED due to FTT at home.    HPI:    Patient says she did get up and walk in her room with therapy today. She is starting to feel better. Her goal is to return home. No diarrhea today. Appetite is ok.     CODE STATUS/ADVANCE DIRECTIVES DISCUSSION:  Full Code    ALLERGIES:   Allergies   Allergen Reactions     Contrast [Iohexol] Rash     Noticed during 08/2020 admission. Received IV contrast on 8/5     Chocolate Headache     Contrast Dye      Penicillins Rash      PAST MEDICAL HISTORY:   Past Medical History:   Diagnosis Date     Aspiration pneumonia (H) 02/2022     Depression      High cholesterol      Migraines      Pyloric ulcer, chronic      Sepsis (H) 08/10/2020     Trigeminal neuralgia       PAST SURGICAL HISTORY:   has a past surgical history that includes Colonoscopy w/wo Brush **Performed** (N/A, 8/13/2020); Pr Esophagogastroduodenoscopy Transoral Diagnostic (N/A, 8/13/2020); Hysterectomy; Pr Esophagogastroduodenoscopy Transoral Diagnostic (N/A, 8/14/2020); PICC/Midline Placement (7/22/2022); Ventriculostomy (Left, 7/31/2022); and IR Gastrostomy Tube Percutaneous Plcmnt (8/16/2022).  FAMILY HISTORY:  family history includes Breast Cancer in her maternal aunt, mother, sister, and sister; Cancer in her father; Testicular cancer (age of onset: 17.00) in her grandchild.  SOCIAL HISTORY:   reports that she quit smoking about 8 years ago. She has a 50.00 pack-year smoking history. She has never used smokeless tobacco. She reports that she does not drink alcohol and does not use drugs.  Patient's living condition: lives alone    Post Discharge Medication Reconciliation Status:   MED REC REQUIRED  Post Medication Reconciliation Status:  Discharge medications reconciled, continue medications without change       Current Outpatient Medications   Medication Sig     acetaminophen-codeine (TYLENOL #3) 300-30 MG per tablet Take 1 tablet by mouth every 6 hours as needed for severe pain     desonide (DESOWEN) 0.05 % external cream Apply to affected area 2 times daily     ferrous sulfate (FEROSUL) 325 (65 Fe) MG tablet Take 1 tablet (325 mg) by mouth daily (with breakfast)     gabapentin (NEURONTIN) 100 MG capsule Take 1 capsule (100 mg) by mouth daily (with breakfast) AND 3 capsules (300 mg) At Bedtime.     levETIRAcetam (KEPPRA) 750 MG tablet Take 1 tablet (750 mg) by mouth 2 times daily     loperamide (IMODIUM) 2 MG capsule Take 1 capsule (2 mg) by mouth 4 times daily as needed for diarrhea     meclizine (ANTIVERT) 12.5 MG tablet Take 1 tablet (12.5 mg) by mouth 2 times daily     mirtazapine (REMERON) 15 MG tablet Take 1 tablet (15 mg) by mouth at bedtime     Multiple Vitamin (MULTIVITAMIN) TABS Take 1 tablet by mouth daily     nortriptyline (PAMELOR) 50 MG capsule Take 1 capsule (50 mg) by mouth At Bedtime     omeprazole (PRILOSEC) 40 MG DR capsule TAKE 1 CAPSULE(40 MG) BY MOUTH DAILY     OXcarbazepine (TRILEPTAL) 150 MG tablet TAKE 3 TABLETS(450 MG) BY MOUTH DAILY     polyvinyl alcohol (ARTIFICIAL TEARS) 1.4 % ophthalmic solution Place 1 drop into both eyes every hour as needed for dry eyes     psyllium (METAMUCIL/KONSYL)  capsule Take 2 capsules by mouth 2 times daily     QUEtiapine (SEROQUEL) 50 MG tablet Take 1 tablet (50 mg) by mouth at bedtime     senna-docusate (STIMULANT LAXATIVE) 8.6-50 MG tablet TAKE 1 TABLET BY MOUTH AT BEDTIME     topiramate (TOPAMAX) 50 MG tablet Take 1 tablet (50 mg) by mouth at bedtime     Vitamin D3 50 mcg (2000 units) tablet Take 1 tablet (50 mcg) by mouth daily     No current facility-administered medications for this visit.       ROS:  4 point ROS including Respiratory, CV, GI and , other than that noted in the HPI,  is negative    Vitals:  /68   Pulse 78   Temp 97.2  F (36.2  C)   Resp 14   Wt 54.9 kg (121 lb)   SpO2 92%   BMI 19.53 kg/m    Exam:  GENERAL APPEARANCE:  Alert, in no distress  ENT:  Mouth and posterior oropharynx normal, moist mucous membranes  EYES:  EOM normal, conjunctiva and lids normal  RESP:  no respiratory distress  CV:  no edema  M/S:   severe kyphosis  SKIN:  Inspection of skin and subcutaneous tissue baseline  PSYCH:  oriented X 3, memory impaired     Lab/Diagnostic data:  Recent labs in [facility] reviewed by me today.     ASSESSMENT/PLAN:  (R53.81) Physical deconditioning  (primary encounter diagnosis)  Comment: Due to acute illness  Plan: PT/OT eval and treat, discharge planning per their recommendations.    (G40.909) Seizure disorder (H)  Comment: Chronic condition being managed with medications.  Plan: Continue current POC with no changes at this time and adjustments as needed.    (G50.0) Trigeminal neuralgia  (G89.4) Chronic pain syndrome  Comment: No complaints today.   Plan: Continue current POC with no changes at this time and adjustments as needed.    (F33.0) Mild recurrent major depression (H24)  Comment: Chronic condition being managed with medications.  Plan: Continue current POC with no changes at this time and adjustments as needed.      Electronically signed by:  DANIELLE Villanueva CNP                     Sincerely,        DANIELLE Villanueva  CNP

## 2023-10-28 ENCOUNTER — LAB REQUISITION (OUTPATIENT)
Dept: LAB | Facility: CLINIC | Age: 78
End: 2023-10-28
Payer: COMMERCIAL

## 2023-10-28 DIAGNOSIS — D50.0 IRON DEFICIENCY ANEMIA SECONDARY TO BLOOD LOSS (CHRONIC): ICD-10-CM

## 2023-10-28 DIAGNOSIS — E87.6 HYPOKALEMIA: ICD-10-CM

## 2023-10-28 LAB
ATRIAL RATE - MUSE: 90 BPM
DIASTOLIC BLOOD PRESSURE - MUSE: 58 MMHG
INTERPRETATION ECG - MUSE: NORMAL
P AXIS - MUSE: 54 DEGREES
PR INTERVAL - MUSE: 158 MS
QRS DURATION - MUSE: 76 MS
QT - MUSE: 378 MS
QTC - MUSE: 462 MS
R AXIS - MUSE: 25 DEGREES
SYSTOLIC BLOOD PRESSURE - MUSE: 122 MMHG
T AXIS - MUSE: 217 DEGREES
VENTRICULAR RATE- MUSE: 90 BPM

## 2023-10-30 ENCOUNTER — TRANSITIONAL CARE UNIT VISIT (OUTPATIENT)
Dept: GERIATRICS | Facility: CLINIC | Age: 78
End: 2023-10-30
Payer: COMMERCIAL

## 2023-10-30 VITALS
TEMPERATURE: 97 F | OXYGEN SATURATION: 95 % | SYSTOLIC BLOOD PRESSURE: 114 MMHG | DIASTOLIC BLOOD PRESSURE: 66 MMHG | RESPIRATION RATE: 18 BRPM | HEART RATE: 90 BPM

## 2023-10-30 DIAGNOSIS — G89.4 CHRONIC PAIN SYNDROME: ICD-10-CM

## 2023-10-30 DIAGNOSIS — R53.81 PHYSICAL DECONDITIONING: Primary | ICD-10-CM

## 2023-10-30 DIAGNOSIS — G50.0 TRIGEMINAL NEURALGIA: ICD-10-CM

## 2023-10-30 LAB
MAGNESIUM SERPL-MCNC: 2 MG/DL (ref 1.7–2.3)
POTASSIUM SERPL-SCNC: 3.7 MMOL/L (ref 3.4–5.3)

## 2023-10-30 PROCEDURE — 84132 ASSAY OF SERUM POTASSIUM: CPT | Mod: ORL | Performed by: NURSE PRACTITIONER

## 2023-10-30 PROCEDURE — 83735 ASSAY OF MAGNESIUM: CPT | Mod: ORL | Performed by: NURSE PRACTITIONER

## 2023-10-30 PROCEDURE — 36415 COLL VENOUS BLD VENIPUNCTURE: CPT | Mod: ORL | Performed by: NURSE PRACTITIONER

## 2023-10-30 PROCEDURE — 99308 SBSQ NF CARE LOW MDM 20: CPT | Performed by: NURSE PRACTITIONER

## 2023-10-30 PROCEDURE — P9603 ONE-WAY ALLOW PRORATED MILES: HCPCS | Mod: ORL | Performed by: NURSE PRACTITIONER

## 2023-10-30 NOTE — PROGRESS NOTES
[facility] GERIATRICS    Chief Complaint   Patient presents with    RECHECK     HPI:  Julisa Chaney is a 78 year old  (1945), who is being seen today for an episodic care visit at: [facility] ([facility]) [[id number]]. PMH includes cognitive impairment, severe malnutrition, left ventricular/frontal lobe abscess, depression, and trigeminal neuralgia. She was hospitalized at [facility] 10/22-10/24/23 for norovirus. She was discharged home instead of TCU and presented back to the ED with FTT at home.     Patient resting in bed during visit today. She has not yet worked with therapy today. She is able to walk the distance in her room before her legs feel tired. She reports good appetite and denies loose stools. VS have remained stable. -120s/55-60s, one outlier 150/73.  Barthel ADL index 30/100 incdicating very dep on ADLs.      Allergies, and PMH/PSH reviewed in [facility] today.  REVIEW OF SYSTEMS:  4 point ROS including Respiratory, CV, GI and , other than that noted in the HPI,  is negative    Objective:   /66   Pulse 90   Temp 97  F (36.1  C)   Resp 18   SpO2 95%   GENERAL APPEARANCE:  Alert, in no distress  RESP:  respiratory effort and palpation of chest normal, lungs clear to auscultation   CV:  Palpation and auscultation of heart done , regular rate and rhythm, no murmur, rub, or gallop  ABDOMEN:  normal bowel sounds, soft, nontender, no hepatosplenomegaly or other masses  PSYCH:  oriented X 3    Recent labs in [facility] reviewed by me today.     Assessment/Plan:  (R53.81) Physical deconditioning  (primary encounter diagnosis)  Comment: 2/2 acute illness  Plan: PT/OT eval and treat, discharge per their recs    (G89.4) Chronic pain syndrome  (G50.0) Trigeminal neuralgia   Comment: No complaints of pain today.   Plan: Continue with current POC with no changes at this time and adjustments as needed.      Orders:  - No new orders today      Electronically signed by: Sharron RAMIREZ  NAINA Carlos Student, [facility]     I was present with the student who participated in the service and in the documentation of the note. I have verified the history and personally performed the physical exam and medical decision-making. I agree with the assessment and plan of care as documented in the note.  Electronically signed by:   DANIELLE Villanueva CNP on 10/30/2023 at 2:03 PM

## 2023-10-30 NOTE — LETTER
10/30/2023        RE: Julisa Chaney  [address]  [place] [address]        [facility] GERIATRICS    Chief Complaint   Patient presents with     RECHECK     HPI:  Julisa Chaney is a 78 year old  (1945), who is being seen today for an episodic care visit at: [facility] ([facility]) [[id number]]. PMH includes cognitive impairment, severe malnutrition, left ventricular/frontal lobe abscess, depression, and trigeminal neuralgia. She was hospitalized at [facility] 10/22-10/24/23 for norovirus. She was discharged home instead of TCU and presented back to the ED with FTT at home.     Patient resting in bed during visit today. She has not yet worked with therapy today. She is able to walk the distance in her room before her legs feel tired. She reports good appetite and denies loose stools. VS have remained stable. -120s/55-60s, one outlier 150/73.  Barthel ADL index 30/100 incdicating very dep on ADLs.      Allergies, and PMH/PSH reviewed in [facility] today.  REVIEW OF SYSTEMS:  4 point ROS including Respiratory, CV, GI and , other than that noted in the HPI,  is negative    Objective:   /66   Pulse 90   Temp 97  F (36.1  C)   Resp 18   SpO2 95%   GENERAL APPEARANCE:  Alert, in no distress  RESP:  respiratory effort and palpation of chest normal, lungs clear to auscultation   CV:  Palpation and auscultation of heart done , regular rate and rhythm, no murmur, rub, or gallop  ABDOMEN:  normal bowel sounds, soft, nontender, no hepatosplenomegaly or other masses  PSYCH:  oriented X 3    Recent labs in [facility] reviewed by me today.     Assessment/Plan:  (R53.81) Physical deconditioning  (primary encounter diagnosis)  Comment: 2/2 acute illness  Plan: PT/OT eval and treat, discharge per their recs    (G89.4) Chronic pain syndrome  (G50.0) Trigeminal neuralgia   Comment: No complaints of pain today.   Plan: Continue with current POC with no changes at this time and adjustments as  needed.      Orders:  - No new orders today      Electronically signed by: Sharron Carlos DNP Student, [facility]     I was present with the student who participated in the service and in the documentation of the note. I have verified the history and personally performed the physical exam and medical decision-making. I agree with the assessment and plan of care as documented in the note.  Electronically signed by:   DANIELLE Villanueva CNP on 10/30/2023 at 2:03 PM        Sincerely,        DANIELLE Villanueva CNP

## 2023-11-01 NOTE — PROGRESS NOTES
"[facility]ERIATRICS  INITIAL VISIT NOTE  November 2, 2023      PRIMARY CARE PROVIDER AND CLINIC:  Mara Murillo [address] [facility] [address]    [facility] Medical Record Number:  [id number]  Place of Service where encounter took place:  [facility] ([facility]) [[id number]]      Chief Complaint   Patient presents with    Hospital F/U       HPI:    Julisa Chaney is a 78 year old  (1945) [facility] Medical history is notable for brain abscess s/p ventriculostomy (2022), seizure disorder, trigeminal neuralgia and chronic pain.   Recent medical course:   **[facility] ER on 10/16/23 with diarrhea and a fall and   **[facility] from 10/22/23 to 10/24/23 where she presented with diarrhea and weakness. Found to have norovirus and hypokalemia (K 2.4). TCU recommended but she declined this and was discharged home.   **[facility] ER on 10/25/23 - unable to care for herself at home. K 2.4 and was repleted.     She was admitted to this facility for  rehab, medical management, and nursing care.      History obtained from: facility chart records, facility staff, patient report, and [facility] chart review.      Today, Ms. Gregorio is seen in her room sitting up in bed.  She reports ongoing loose stools, mostly in the evening.  No associated abdominal pain.  She said she is \"keeping more down\" than she was.  No emesis.  No chest pain or dyspnea.  Reviewed her medications with her and noted she is on twice daily fiber as well as scheduled senna at bedtime.  She is okay decreasing the fiber and stopping the senna.  Also talked with her about starting a probiotic.    I talked with nutrition, and they have started her on bananatrol.  This also has a pre- and probiotic in it.  They reviewed food choices with her and other strategies to mitigate the likely loss of gut edwige from norovirus.  No acute concerns today per nursing.  She is working with therapies.    CODE STATUS: " CPR/Full code     ALLERGIES:  Allergies   Allergen Reactions    Contrast [Iohexol] Rash     Noticed during 08/2020 admission. Received IV contrast on 8/5    Chocolate Headache    Contrast Dye     Penicillins Rash       PAST MEDICAL HISTORY:   Past Medical History:   Diagnosis Date    Aspiration pneumonia (H) 02/2022    Depression     High cholesterol     Migraines     Pyloric ulcer, chronic     Sepsis (H) 08/10/2020    Trigeminal neuralgia        PAST SURGICAL HISTORY:   Past Surgical History:   Procedure Laterality Date    HYSTERECTOMY      IR GASTROSTOMY TUBE PERCUTANEOUS PLCMNT  8/16/2022    PICC TRIPLE LUMEN PLACEMENT  7/22/2022         MD ESOPHAGOGASTRODUODENOSCOPY TRANSORAL DIAGNOSTIC N/A 8/13/2020    Procedure: ESOPHAGOGASTRODUODENOSCOPY (EGD);  Surgeon: Nathan Smalls MD;  Location: [facility];  Service: Gastroenterology    MD ESOPHAGOGASTRODUODENOSCOPY TRANSORAL DIAGNOSTIC N/A 8/14/2020    Procedure: ESOPHAGOGASTRODUODENOSCOPY (EGD), PYLORIC DILATION;  Surgeon: Nathan Smalls MD;  Location: [facility];  Service: Gastroenterology    VENTRICULOSTOMY Left 7/31/2022    Procedure: LEFT FRONTAL VENTRICULOSTOMY;  Surgeon: Brady Heredia MD;  Location: [facility] COLONOSCOPY W/WO BRUSH/WASH N/A 8/13/2020    Procedure: COLONOSCOPY WITH POLYPECTOMY;  Surgeon: Nathan Smalls MD;  Location: [facility];  Service: Gastroenterology       FAMILY HISTORY:   Family History   Problem Relation Age of Onset    Cancer Father     Testicular cancer Grandchild 17.00    Breast Cancer Mother     Breast Cancer Sister     Breast Cancer Maternal Aunt     Breast Cancer Sister        SOCIAL HISTORY:   Patient's living condition:  lives alone in a house    MEDICATIONS:  Post Discharge Medication Reconciliation Status: discharge medications reconciled and changed, per note/orders.  Current Outpatient Medications   Medication Sig Dispense Refill    acetaminophen-codeine (TYLENOL #3) 300-30 MG per tablet  Take 1 tablet by mouth every 6 hours as needed for severe pain 30 tablet 0    desonide (DESOWEN) 0.05 % external cream Apply to affected area 2 times daily 60 g 0    ferrous sulfate (FEROSUL) 325 (65 Fe) MG tablet Take 1 tablet (325 mg) by mouth daily (with breakfast) 30 tablet 1    fiber modular (BANATROL TF) packet Take 1 packet by mouth 2 times daily (with meals)      gabapentin (NEURONTIN) 100 MG capsule Take 1 capsule (100 mg) by mouth daily (with breakfast) AND 3 capsules (300 mg) At Bedtime. 120 capsule 0    LACTOBACILLUS PO Take 1 capsule by mouth daily X14 days      levETIRAcetam (KEPPRA) 750 MG tablet Take 1 tablet (750 mg) by mouth 2 times daily 60 tablet 0    loperamide (IMODIUM) 2 MG capsule Take 1 capsule (2 mg) by mouth 4 times daily as needed for diarrhea      meclizine (ANTIVERT) 12.5 MG tablet Take 1 tablet (12.5 mg) by mouth 2 times daily 60 tablet 1    mirtazapine (REMERON) 15 MG tablet Take 1 tablet (15 mg) by mouth at bedtime 30 tablet 0    Multiple Vitamin (MULTIVITAMIN) TABS Take 1 tablet by mouth daily 100 tablet 3    nortriptyline (PAMELOR) 50 MG capsule Take 1 capsule (50 mg) by mouth At Bedtime 90 capsule 3    omeprazole (PRILOSEC) 40 MG DR capsule TAKE 1 CAPSULE(40 MG) BY MOUTH DAILY 90 capsule 3    OXcarbazepine (TRILEPTAL) 150 MG tablet TAKE 3 TABLETS(450 MG) BY MOUTH DAILY 270 tablet 1    polyvinyl alcohol (ARTIFICIAL TEARS) 1.4 % ophthalmic solution Place 1 drop into both eyes every hour as needed for dry eyes 60 mL 0    psyllium (METAMUCIL/KONSYL) capsule Take 2 capsules by mouth 2 times daily (Patient taking differently: Take 2 capsules by mouth daily) 360 capsule 3    QUEtiapine (SEROQUEL) 50 MG tablet Take 1 tablet (50 mg) by mouth at bedtime 30 tablet 0    topiramate (TOPAMAX) 50 MG tablet Take 1 tablet (50 mg) by mouth at bedtime 30 tablet 0    Vitamin D3 50 mcg (2000 units) tablet Take 1 tablet (50 mcg) by mouth daily 90 tablet 4       ROS:  8 point ROS neg other than the  "symptoms noted above in the HPI.    PHYSICAL EXAM:  /51   Pulse 83   Temp 97  F (36.1  C)   Resp 16   Ht 1.676 m (5' 6\")   Wt 50.1 kg (110 lb 6.4 oz)   SpO2 93%   BMI 17.82 kg/m    Gen: sitting up in bed, alert, cooperative and in no acute distress  HEENT: poorly fitting upper dentures;  Resp: breathing non labored, no tachypnea   Ext: no LE edema  Neuro: CX II-XII grossly in tact; ROM in all four extremities grossly in tact  Psych: alert and oriented x3; normal affect     LABORATORY/IMAGING DATA:  Reviewed as per [facility] and/or [facility]    ASSESSMENT/PLAN:    Norovirus, Resolved  Loose Stools  I think has some resultant loss of good gut edwige from the Norovirus.   -- decrease daily fiber from BID to daily  -- discotinue scheudled senna at bedtime  -- sart daily probiotic x14d  -- Banatrol BID started by dietician yesterday    Hypokalemia  In setting of diarrhea and poor PO intake. K 3.7 on 10/30.   -- she's eating better; follow clinically     Fe Deficiency Anemia  Baseline Hgb 13 range.   -- FeSO4 325 mg daily     Trigeminal Neuralgia  Seizure Disorder   Chronic Pain Syndrome  History of Brain Abscess s/p Ventriculostomy (2022)  Baseline pain today, per her report  -- gabapentin 100 mg with breakfast and 300 mg at bedtime, nortriptyline 50 mg at bedtime, oxcarbazepine 450 mg daily and topiramate 50 mg at bedtime   -- levetiracetam 750 mg BID, meclizine 12.5 mg BID  -- APAP w/ codeine 1 tab q6h PRN    Mild Major Recurrent Depression  Mood and spirits were OK today. She wants to get home  -- mirtazapine 15 mg at bedtime and quetiapine 50 mg at bedtime  -- supportive cares    Protein Calorie Malnutrition  In setting of above  -- nutrition following     Physical Deconditioning  In setting of hospitalization and underlying medical conditions  -- ongoing PT/OT    Electronically signed by:  Clementine Berumen MD                      "

## 2023-11-02 ENCOUNTER — TRANSITIONAL CARE UNIT VISIT (OUTPATIENT)
Dept: GERIATRICS | Facility: CLINIC | Age: 78
End: 2023-11-02
Payer: COMMERCIAL

## 2023-11-02 VITALS
HEIGHT: 66 IN | RESPIRATION RATE: 16 BRPM | DIASTOLIC BLOOD PRESSURE: 51 MMHG | SYSTOLIC BLOOD PRESSURE: 100 MMHG | BODY MASS INDEX: 17.74 KG/M2 | OXYGEN SATURATION: 93 % | WEIGHT: 110.4 LBS | TEMPERATURE: 97 F | HEART RATE: 83 BPM

## 2023-11-02 DIAGNOSIS — G89.4 CHRONIC PAIN SYNDROME: ICD-10-CM

## 2023-11-02 DIAGNOSIS — G50.0 TRIGEMINAL NEURALGIA: ICD-10-CM

## 2023-11-02 DIAGNOSIS — E46 PROTEIN-CALORIE MALNUTRITION, UNSPECIFIED SEVERITY (H): ICD-10-CM

## 2023-11-02 DIAGNOSIS — D50.9 IRON DEFICIENCY ANEMIA, UNSPECIFIED IRON DEFICIENCY ANEMIA TYPE: ICD-10-CM

## 2023-11-02 DIAGNOSIS — F33.0 MILD RECURRENT MAJOR DEPRESSION (H): ICD-10-CM

## 2023-11-02 DIAGNOSIS — G40.909 SEIZURE DISORDER (H): ICD-10-CM

## 2023-11-02 DIAGNOSIS — R19.7 DIARRHEA, UNSPECIFIED TYPE: Primary | ICD-10-CM

## 2023-11-02 DIAGNOSIS — R53.81 PHYSICAL DECONDITIONING: ICD-10-CM

## 2023-11-02 PROCEDURE — 99305 1ST NF CARE MODERATE MDM 35: CPT | Performed by: INTERNAL MEDICINE

## 2023-11-02 NOTE — LETTER
"    11/2/2023        RE: Julisa Chaney  [address]  [place] [address]        [facility] GERIATRICS  INITIAL VISIT NOTE  November 2, 2023      PRIMARY CARE PROVIDER AND CLINIC:  Mara Murillo [address] / [place] [address]    [facility] Medical Record Number:  [id number]  Place of Service where encounter took place:  [facility] ([facility]) [[id number]]      Chief Complaint   Patient presents with     Hospital F/U       HPI:    Julisa Chaney is a 78 year old  (1945) [facility] Medical history is notable for brain abscess s/p ventriculostomy (2022), seizure disorder, trigeminal neuralgia and chronic pain.   Recent medical course:   **[facility] ER on 10/16/23 with diarrhea and a fall and   **[facility]23 where she presented with diarrhea and weakness. Found to have norovirus and hypokalemia (K 2.4). U recommended but she declined this and was discharged home.   **[facility] ER on 10/25/23 - unable to care for herself at home. K 2.4 and was repleted.     She was admitted to this facility for  rehab, medical management, and nursing care.      History obtained from: facility chart records, facility staff, patient report, and [facility] chart review.      Today, Ms. Gregorio is seen in her room sitting up in bed.  She reports ongoing loose stools, mostly in the evening.  No associated abdominal pain.  She said she is \"keeping more down\" than she was.  No emesis.  No chest pain or dyspnea.  Reviewed her medications with her and noted she is on twice daily fiber as well as scheduled senna at bedtime.  She is okay decreasing the fiber and stopping the senna.  Also talked with her about starting a probiotic.    I talked with nutrition, and they have started her on bananatrol.  This also has a pre- and probiotic in it.  They reviewed food choices with her and other strategies to mitigate the likely loss of gut edwige from " norovirus.  No acute concerns today per nursing.  She is working with therapies.    CODE STATUS: CPR/Full code     ALLERGIES:  Allergies   Allergen Reactions     Contrast [Iohexol] Rash     Noticed during 08/2020 admission. Received IV contrast on 8/5     Chocolate Headache     Contrast Dye      Penicillins Rash       PAST MEDICAL HISTORY:   Past Medical History:   Diagnosis Date     Aspiration pneumonia (H) 02/2022     Depression      High cholesterol      Migraines      Pyloric ulcer, chronic      Sepsis (H) 08/10/2020     Trigeminal neuralgia        PAST SURGICAL HISTORY:   Past Surgical History:   Procedure Laterality Date     HYSTERECTOMY       IR GASTROSTOMY TUBE PERCUTANEOUS PLCMNT  8/16/2022     PICC TRIPLE LUMEN PLACEMENT  7/22/2022          FL ESOPHAGOGASTRODUODENOSCOPY TRANSORAL DIAGNOSTIC N/A 8/13/2020    Procedure: ESOPHAGOGASTRODUODENOSCOPY (EGD);  Surgeon: Nathan Smalls MD;  Location: [facility];  Service: Gastroenterology     FL ESOPHAGOGASTRODUODENOSCOPY TRANSORAL DIAGNOSTIC N/A 8/14/2020    Procedure: ESOPHAGOGASTRODUODENOSCOPY (EGD), PYLORIC DILATION;  Surgeon: Nathan Smalls MD;  Location: [facility];  Service: Gastroenterology     VENTRICULOSTOMY Left 7/31/2022    Procedure: LEFT FRONTAL VENTRICULOSTOMY;  Surgeon: Brady Heredia MD;  Location: [facility] COLONOSCOPY W/WO BRUSH/WASH N/A 8/13/2020    Procedure: COLONOSCOPY WITH POLYPECTOMY;  Surgeon: Nathan Smalls MD;  Location: [facility];  Service: Gastroenterology       FAMILY HISTORY:   Family History   Problem Relation Age of Onset     Cancer Father      Testicular cancer Grandchild 17.00     Breast Cancer Mother      Breast Cancer Sister      Breast Cancer Maternal Aunt      Breast Cancer Sister        SOCIAL HISTORY:   Patient's living condition:  lives alone in a house    MEDICATIONS:  Post Discharge Medication Reconciliation Status: discharge medications reconciled and changed, per  note/orders.  Current Outpatient Medications   Medication Sig Dispense Refill     acetaminophen-codeine (TYLENOL #3) 300-30 MG per tablet Take 1 tablet by mouth every 6 hours as needed for severe pain 30 tablet 0     desonide (DESOWEN) 0.05 % external cream Apply to affected area 2 times daily 60 g 0     ferrous sulfate (FEROSUL) 325 (65 Fe) MG tablet Take 1 tablet (325 mg) by mouth daily (with breakfast) 30 tablet 1     fiber modular (BANATROL TF) packet Take 1 packet by mouth 2 times daily (with meals)       gabapentin (NEURONTIN) 100 MG capsule Take 1 capsule (100 mg) by mouth daily (with breakfast) AND 3 capsules (300 mg) At Bedtime. 120 capsule 0     LACTOBACILLUS PO Take 1 capsule by mouth daily X14 days       levETIRAcetam (KEPPRA) 750 MG tablet Take 1 tablet (750 mg) by mouth 2 times daily 60 tablet 0     loperamide (IMODIUM) 2 MG capsule Take 1 capsule (2 mg) by mouth 4 times daily as needed for diarrhea       meclizine (ANTIVERT) 12.5 MG tablet Take 1 tablet (12.5 mg) by mouth 2 times daily 60 tablet 1     mirtazapine (REMERON) 15 MG tablet Take 1 tablet (15 mg) by mouth at bedtime 30 tablet 0     Multiple Vitamin (MULTIVITAMIN) TABS Take 1 tablet by mouth daily 100 tablet 3     nortriptyline (PAMELOR) 50 MG capsule Take 1 capsule (50 mg) by mouth At Bedtime 90 capsule 3     omeprazole (PRILOSEC) 40 MG DR capsule TAKE 1 CAPSULE(40 MG) BY MOUTH DAILY 90 capsule 3     OXcarbazepine (TRILEPTAL) 150 MG tablet TAKE 3 TABLETS(450 MG) BY MOUTH DAILY 270 tablet 1     polyvinyl alcohol (ARTIFICIAL TEARS) 1.4 % ophthalmic solution Place 1 drop into both eyes every hour as needed for dry eyes 60 mL 0     psyllium (METAMUCIL/KONSYL) capsule Take 2 capsules by mouth 2 times daily (Patient taking differently: Take 2 capsules by mouth daily) 360 capsule 3     QUEtiapine (SEROQUEL) 50 MG tablet Take 1 tablet (50 mg) by mouth at bedtime 30 tablet 0     topiramate (TOPAMAX) 50 MG tablet Take 1 tablet (50 mg) by mouth at  "bedtime 30 tablet 0     Vitamin D3 50 mcg (2000 units) tablet Take 1 tablet (50 mcg) by mouth daily 90 tablet 4       ROS:  8 point ROS neg other than the symptoms noted above in the HPI.    PHYSICAL EXAM:  /51   Pulse 83   Temp 97  F (36.1  C)   Resp 16   Ht 1.676 m (5' 6\")   Wt 50.1 kg (110 lb 6.4 oz)   SpO2 93%   BMI 17.82 kg/m    Gen: sitting up in bed, alert, cooperative and in no acute distress  HEENT: poorly fitting upper dentures;  Resp: breathing non labored, no tachypnea   Ext: no LE edema  Neuro: CX II-XII grossly in tact; ROM in all four extremities grossly in tact  Psych: alert and oriented x3; normal affect     LABORATORY/IMAGING DATA:  Reviewed as per [facility] and/or [facility]    ASSESSMENT/PLAN:    Norovirus, Resolved  Loose Stools  I think has some resultant loss of good gut edwige from the Norovirus.   -- decrease daily fiber from BID to daily  -- discotinue scheudled senna at bedtime  -- sart daily probiotic x14d  -- Banatrol BID started by dietician yesterday    Hypokalemia  In setting of diarrhea and poor PO intake. K 3.7 on 10/30.   -- she's eating better; follow clinically     Fe Deficiency Anemia  Baseline Hgb 13 range.   -- FeSO4 325 mg daily     Trigeminal Neuralgia  Seizure Disorder   Chronic Pain Syndrome  History of Brain Abscess s/p Ventriculostomy (2022)  Baseline pain today, per her report  -- gabapentin 100 mg with breakfast and 300 mg at bedtime, nortriptyline 50 mg at bedtime, oxcarbazepine 450 mg daily and topiramate 50 mg at bedtime   -- levetiracetam 750 mg BID, meclizine 12.5 mg BID  -- APAP w/ codeine 1 tab q6h PRN    Mild Major Recurrent Depression  Mood and spirits were OK today. She wants to get home  -- mirtazapine 15 mg at bedtime and quetiapine 50 mg at bedtime  -- supportive cares    Protein Calorie Malnutrition  In setting of above  -- nutrition following     Physical Deconditioning  In setting of hospitalization and underlying medical conditions  -- " ongoing PT/OT    Electronically signed by:  Clementine Berumen MD                          Sincerely,        Clementine Berumen MD

## 2023-11-06 ENCOUNTER — TRANSITIONAL CARE UNIT VISIT (OUTPATIENT)
Dept: GERIATRICS | Facility: CLINIC | Age: 78
End: 2023-11-06
Payer: COMMERCIAL

## 2023-11-06 VITALS
DIASTOLIC BLOOD PRESSURE: 55 MMHG | WEIGHT: 109.4 LBS | TEMPERATURE: 96.1 F | RESPIRATION RATE: 18 BRPM | BODY MASS INDEX: 17.58 KG/M2 | SYSTOLIC BLOOD PRESSURE: 127 MMHG | HEART RATE: 92 BPM | HEIGHT: 66 IN | OXYGEN SATURATION: 91 %

## 2023-11-06 DIAGNOSIS — G50.0 TRIGEMINAL NEURALGIA: ICD-10-CM

## 2023-11-06 DIAGNOSIS — R19.7 DIARRHEA, UNSPECIFIED TYPE: ICD-10-CM

## 2023-11-06 DIAGNOSIS — G89.4 CHRONIC PAIN SYNDROME: ICD-10-CM

## 2023-11-06 DIAGNOSIS — R53.81 PHYSICAL DECONDITIONING: Primary | ICD-10-CM

## 2023-11-06 PROCEDURE — 99309 SBSQ NF CARE MODERATE MDM 30: CPT | Performed by: NURSE PRACTITIONER

## 2023-11-06 RX ORDER — ACETAMINOPHEN AND CODEINE PHOSPHATE 300; 30 MG/1; MG/1
1 TABLET ORAL EVERY 6 HOURS PRN
Qty: 30 TABLET | Refills: 0 | Status: SHIPPED | OUTPATIENT
Start: 2023-11-06 | End: 2023-11-30

## 2023-11-06 RX ORDER — LOPERAMIDE HCL 2 MG
CAPSULE ORAL
Start: 2023-11-06 | End: 2024-01-11

## 2023-11-06 NOTE — LETTER
"    11/6/2023        RE: Julisa Chaney  [address]  [place] [address]        M [facility] GERIATRICS    Chief Complaint   Patient presents with     RECHECK     HPI:  Julisa Chaney is a 78 year old  (1945), who is being seen today for an episodic care visit at: [facility] [[id number]].     Today's concern is:   Patient has been having loose stools for several days. Last week her senna was stopped, psyllium reduced and banatrol flakes added. She says that her stools are still loose. For the past couple of days, she has had a BM after every meal. She is getting more fatigued and weak from this. She has lost a few pounds.    Allergies, and PMH/PSH reviewed in EPIC today.  REVIEW OF SYSTEMS:  4 point ROS including Respiratory, CV, GI and , other than that noted in the HPI,  is negative    Objective:   /55   Pulse 92   Temp (!) 96.1  F (35.6  C)   Resp 18   Ht 1.676 m (5' 6\")   Wt 49.6 kg (109 lb 6.4 oz)   SpO2 91%   BMI 17.66 kg/m    GENERAL APPEARANCE:  Alert, in no distress, thin  EYES:  EOM normal, conjunctiva and lids normal  RESP:  no respiratory distress  PSYCH:  oriented X 3, affect and mood normal      Assessment/Plan:  (R53.81) Physical deconditioning  (primary encounter diagnosis)  Comment: Due to acute illness. Patient is having a difficult time recovering due to ongoing diarrhea.  Plan: PT/OT eval and treat, discharge planning per their recommendations.    (R19.7) Diarrhea, unspecified type  Comment: Ongoing, likely the effects of norovirus are still lingering. Expected to improve with probiotic and time. Will cautiously schedule some imodium. Educated patient to requests prn doses if needed as well  Plan: Imodium 2mg daily. Discontinue psyllium. Monitor bowel function. Adjust medication as clinically indicated.      Electronically signed by: DANIELLE Villanueva CNP           Sincerely,        DANIELLE Villanueva CNP      "

## 2023-11-06 NOTE — PROGRESS NOTES
"[facility] GERIATRICS    Chief Complaint   Patient presents with    RECHECK     HPI:  Julisa Chaney is a 78 year old  (1945), who is being seen today for an episodic care visit at: [facility] [[id number]].     Today's concern is:   Patient has been having loose stools for several days. Last week her senna was stopped, psyllium reduced and banatrol flakes added. She says that her stools are still loose. For the past couple of days, she has had a BM after every meal. She is getting more fatigued and weak from this. She has lost a few pounds.    Allergies, and PMH/PSH reviewed in EPIC today.  REVIEW OF SYSTEMS:  4 point ROS including Respiratory, CV, GI and , other than that noted in the HPI,  is negative    Objective:   /55   Pulse 92   Temp (!) 96.1  F (35.6  C)   Resp 18   Ht 1.676 m (5' 6\")   Wt 49.6 kg (109 lb 6.4 oz)   SpO2 91%   BMI 17.66 kg/m    GENERAL APPEARANCE:  Alert, in no distress, thin  EYES:  EOM normal, conjunctiva and lids normal  RESP:  no respiratory distress  PSYCH:  oriented X 3, affect and mood normal      Assessment/Plan:  (R53.81) Physical deconditioning  (primary encounter diagnosis)  Comment: Due to acute illness. Patient is having a difficult time recovering due to ongoing diarrhea.  Plan: PT/OT eval and treat, discharge planning per their recommendations.    (R19.7) Diarrhea, unspecified type  Comment: Ongoing, likely the effects of norovirus are still lingering. Expected to improve with probiotic and time. Will cautiously schedule some imodium. Educated patient to requests prn doses if needed as well  Plan: Imodium 2mg daily. Discontinue psyllium. Monitor bowel function. Adjust medication as clinically indicated.      Electronically signed by: DANIELLE Villanueva CNP       "

## 2023-11-10 ENCOUNTER — DISCHARGE SUMMARY NURSING HOME (OUTPATIENT)
Dept: GERIATRICS | Facility: CLINIC | Age: 78
End: 2023-11-10
Payer: COMMERCIAL

## 2023-11-10 VITALS
BODY MASS INDEX: 17.58 KG/M2 | SYSTOLIC BLOOD PRESSURE: 114 MMHG | TEMPERATURE: 96.1 F | DIASTOLIC BLOOD PRESSURE: 58 MMHG | HEART RATE: 90 BPM | HEIGHT: 66 IN | WEIGHT: 109.4 LBS | OXYGEN SATURATION: 94 % | RESPIRATION RATE: 18 BRPM

## 2023-11-10 DIAGNOSIS — G50.0 TRIGEMINAL NEURALGIA: ICD-10-CM

## 2023-11-10 DIAGNOSIS — R41.89 COGNITIVE IMPAIRMENT: ICD-10-CM

## 2023-11-10 DIAGNOSIS — R19.7 DIARRHEA, UNSPECIFIED TYPE: ICD-10-CM

## 2023-11-10 DIAGNOSIS — R53.81 PHYSICAL DECONDITIONING: Primary | ICD-10-CM

## 2023-11-10 DIAGNOSIS — G40.909 SEIZURE DISORDER (H): ICD-10-CM

## 2023-11-10 PROCEDURE — 99316 NF DSCHRG MGMT 30 MIN+: CPT | Performed by: NURSE PRACTITIONER

## 2023-11-10 NOTE — PROGRESS NOTES
[facility] DISCHARGE [place]  PATIENT'S NAME: Julisa Chaney  YOB: 1945  MEDICAL RECORD NUMBER:  [id number]  Place of Service where encounter took place:  [facility] (U) [[id number]]    PRIMARY CARE PROVIDER AND CLINIC RESPONSIBLE AFTER TRANSFER:   Mara Murillo MD, [address] [facility] [address]    [facility] Provider     Transferring providers: DANIELLE Villanueva CNP, Clementine Berumen MD  Recent Hospitalization/ED:  Hospital  [facility] stay 10/25/23 to 10/26/23.  Date of SNF Admission: October 26, 2023  Date of SNF (anticipated) Discharge: Insurance driven, November 12, 2023  Discharged to: previous independent home  Cognitive Scores: SLUMS: 21/30  Physical Function: Ambulating 50 ft with walker  DME: No new DME needed    CODE STATUS/ADVANCE DIRECTIVES DISCUSSION:  Full Code   ALLERGIES: Contrast [iohexol], Chocolate, Contrast dye, and Penicillins    NURSING FACILITY COURSE   Medication Changes/Rationale:   Bowel meds adjusted    Summary of nursing facility stay:   Julisa Chaney  is a 78 year old  (1945), admitted to the above facility from  [facility]. Hospital stay 10/25/23 through 10/26/23. 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 She was hospitalized 10/22 to 10/24 for norovirus. She chose to go home instead of TCU. She presented back to the ED due to FTT at home.    Physical deconditioning  Patient was having difficulty progressing with therapy due to fatigue and weakness from ongoing diarrhea. She has made some improvement. She had told therapy that she was walking quite a bit at home, but they found out that she is generally wheelchair bound. Her daughter will be staying with her initially. Would highly recommend assisted living. The patient says she has considered this, but is overwhelmed with how to  start. She has so many things in her house that she wants to go through.    Diarrhea, unspecified type  Patient continues to have loose stools, although it sounds as if they have gotten more manageable. This likely due to lasting effects of her gastroenteritis. Advised patient to continue using imodium at home. She     Cognitive impairment  SLUMS 21/30. Therapy was not able to complete CPT.     Trigeminal neuralgia  Chronic condition being managed with medications and is currently asymptomatic.    Seizure disorder (H)  No s/sx seizures while at TCU      Discharge Medications:  Current Outpatient Medications   Medication Sig Dispense Refill    acetaminophen-codeine (TYLENOL #3) 300-30 MG per tablet Take 1 tablet by mouth every 6 hours as needed for severe pain 30 tablet 0    desonide (DESOWEN) 0.05 % external cream Apply to affected area 2 times daily 60 g 0    ferrous sulfate (FEROSUL) 325 (65 Fe) MG tablet Take 1 tablet (325 mg) by mouth daily (with breakfast) 30 tablet 1    fiber modular (BANATROL TF) packet Take 1 packet by mouth 2 times daily (with meals)      gabapentin (NEURONTIN) 100 MG capsule Take 1 capsule (100 mg) by mouth daily (with breakfast) AND 3 capsules (300 mg) At Bedtime. 120 capsule 0    LACTOBACILLUS PO Take 1 capsule by mouth daily X14 days      levETIRAcetam (KEPPRA) 750 MG tablet Take 1 tablet (750 mg) by mouth 2 times daily 60 tablet 0    loperamide (IMODIUM) 2 MG capsule Take 1 capsule (2 mg) by mouth daily. May also take 1 capsule (2 mg) 4 times daily as needed for diarrhea.      meclizine (ANTIVERT) 12.5 MG tablet Take 1 tablet (12.5 mg) by mouth 2 times daily 60 tablet 1    mirtazapine (REMERON) 15 MG tablet Take 1 tablet (15 mg) by mouth at bedtime 30 tablet 0    Multiple Vitamin (MULTIVITAMIN) TABS Take 1 tablet by mouth daily 100 tablet 3    nortriptyline (PAMELOR) 50 MG capsule Take 1 capsule (50 mg) by mouth At Bedtime 90 capsule 3    omeprazole (PRILOSEC) 40 MG DR capsule TAKE 1  "CAPSULE(40 MG) BY MOUTH DAILY 90 capsule 3    OXcarbazepine (TRILEPTAL) 150 MG tablet TAKE 3 TABLETS(450 MG) BY MOUTH DAILY 270 tablet 1    polyvinyl alcohol (ARTIFICIAL TEARS) 1.4 % ophthalmic solution Place 1 drop into both eyes every hour as needed for dry eyes 60 mL 0    QUEtiapine (SEROQUEL) 50 MG tablet Take 1 tablet (50 mg) by mouth at bedtime 30 tablet 0    topiramate (TOPAMAX) 50 MG tablet Take 1 tablet (50 mg) by mouth at bedtime 30 tablet 0    Vitamin D3 50 mcg (2000 units) tablet Take 1 tablet (50 mcg) by mouth daily 90 tablet 4        Controlled medications:   Remaining Tylenol #3 to be sent home     Past Medical History:   Past Medical History:   Diagnosis Date    Aspiration pneumonia (H) 02/2022    Depression     High cholesterol     Migraines     Pyloric ulcer, chronic     Sepsis (H) 08/10/2020    Trigeminal neuralgia      Physical Exam:   Vitals: /58   Pulse 90   Temp (!) 96.1  F (35.6  C)   Resp 18   Ht 1.676 m (5' 6\")   Wt 49.6 kg (109 lb 6.4 oz)   SpO2 94%   BMI 17.66 kg/m    BMI: Body mass index is 17.66 kg/m .  GENERAL APPEARANCE:  Alert, in no distress, thin  ENT:  Mouth and posterior oropharynx normal, moist mucous membranes  EYES:  EOM normal, conjunctiva and lids normal  RESP:  no respiratory distress  CV:  no edema  PSYCH:  oriented X 3, affect and mood normal     SNF labs: Labs done in SNF are in [facility]. Please refer to them using [facility]/Care Everywhere.    DISCHARGE PLAN:  Follow up labs: No labs orders/due  Medical Follow Up:      Follow up with primary care provider in 1-2 weeks  Discharge Services: Home Care:  Occupational Therapy, Physical Therapy, Registered Nurse, Home Health Aide, and       TOTAL DISCHARGE TIME:   Greater than 30 minutes  Electronically signed by:  DANIELLE Villanueva CNP       Documentation of Face to Face and Certification for Home Health Services    I certify that services are/were furnished while this patient was under the care " of a physician and that a physician or an allowed non-physician practitioner (NPP), had a face-to-face encounter that meets the physician face-to-face encounter requirements. The encounter was in whole, or in part, related to the primary reason for home health. The patient is confined to his/her home and needs intermittent skilled nursing, physical therapy, speech-language pathology, or the continued need for occupational therapy. A plan of care has been established by a physician and is periodically reviewed by a physician.  Date of Face-to-Face Encounter: 11/10/2023.    I certify that, based on my findings, the following services are medically necessary home health services: Nursing, Occupational Therapy, Physical Therapy, and Social Work.    My clinical findings support the need for the above skilled services because: Requires assistance of another person or specialized equipment to access medical services because patient: Is prone to wander/get lost without assistance. and Is unable to walk greater than 50 feet without rest...    Patient to re-establish plan of care with their PCP within 7-10 days after leaving the facility to reestablish care.  Medicare certified PECOS provider: DANIELLE Villanueva CNP  Date: November 10, 2023

## 2023-11-10 NOTE — LETTER
11/10/2023        RE: Julisa Chaney  [address]  [place] [address]        [facility] GERIATRICS DISCHARGE SUMMARY  PATIENT'S NAME: Julisa Chaney  YOB: 1945  MEDICAL RECORD NUMBER:  [id number]  Place of Service where encounter took place:  [facility] (U) [[id number]]    PRIMARY CARE PROVIDER AND CLINIC RESPONSIBLE AFTER TRANSFER:   Mara Murillo MD, [address] [facility] [address]    [facility] Provider     Transferring providers: DANIELLE Villanueva CNP, Clementine Berumen MD  Recent Hospitalization/ED:  Hospital  [facility] stay 10/25/23 to 10/26/23.  Date of SNF Admission: October 26, 2023  Date of SNF (anticipated) Discharge: Insurance driven, November 12, 2023  Discharged to: previous independent home  Cognitive Scores: SLUMS: 21/30  Physical Function: Ambulating 50 ft with walker  DME: No new DME needed    CODE STATUS/ADVANCE DIRECTIVES DISCUSSION:  Full Code   ALLERGIES: Contrast [iohexol], Chocolate, Contrast dye, and Penicillins    NURSING FACILITY COURSE   Medication Changes/Rationale:   Bowel meds adjusted    Summary of nursing facility stay:   Julisa Chaney  is a 78 year old  (1945), admitted to the above facility from  [facility]. Hospital stay 10/25/23 through 10/26/23. 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 She was hospitalized 10/22 to 10/24 for norovirus. She chose to go home instead of TCU. She presented back to the ED due to FTT at home.    Physical deconditioning  Patient was having difficulty progressing with therapy due to fatigue and weakness from ongoing diarrhea. She has made some improvement. She had told therapy that she was walking quite a bit at home, but they found out that she is generally wheelchair bound. Her daughter will be staying with her initially. Would highly recommend  assisted living. The patient says she has considered this, but is overwhelmed with how to start. She has so many things in her house that she wants to go through.    Diarrhea, unspecified type  Patient continues to have loose stools, although it sounds as if they have gotten more manageable. This likely due to lasting effects of her gastroenteritis. Advised patient to continue using imodium at home. She     Cognitive impairment  SLUMS 21/30. Therapy was not able to complete CPT.     Trigeminal neuralgia  Chronic condition being managed with medications and is currently asymptomatic.    Seizure disorder (H)  No s/sx seizures while at TCU      Discharge Medications:  Current Outpatient Medications   Medication Sig Dispense Refill     acetaminophen-codeine (TYLENOL #3) 300-30 MG per tablet Take 1 tablet by mouth every 6 hours as needed for severe pain 30 tablet 0     desonide (DESOWEN) 0.05 % external cream Apply to affected area 2 times daily 60 g 0     ferrous sulfate (FEROSUL) 325 (65 Fe) MG tablet Take 1 tablet (325 mg) by mouth daily (with breakfast) 30 tablet 1     fiber modular (BANATROL TF) packet Take 1 packet by mouth 2 times daily (with meals)       gabapentin (NEURONTIN) 100 MG capsule Take 1 capsule (100 mg) by mouth daily (with breakfast) AND 3 capsules (300 mg) At Bedtime. 120 capsule 0     LACTOBACILLUS PO Take 1 capsule by mouth daily X14 days       levETIRAcetam (KEPPRA) 750 MG tablet Take 1 tablet (750 mg) by mouth 2 times daily 60 tablet 0     loperamide (IMODIUM) 2 MG capsule Take 1 capsule (2 mg) by mouth daily. May also take 1 capsule (2 mg) 4 times daily as needed for diarrhea.       meclizine (ANTIVERT) 12.5 MG tablet Take 1 tablet (12.5 mg) by mouth 2 times daily 60 tablet 1     mirtazapine (REMERON) 15 MG tablet Take 1 tablet (15 mg) by mouth at bedtime 30 tablet 0     Multiple Vitamin (MULTIVITAMIN) TABS Take 1 tablet by mouth daily 100 tablet 3     nortriptyline (PAMELOR) 50 MG capsule Take  "1 capsule (50 mg) by mouth At Bedtime 90 capsule 3     omeprazole (PRILOSEC) 40 MG DR capsule TAKE 1 CAPSULE(40 MG) BY MOUTH DAILY 90 capsule 3     OXcarbazepine (TRILEPTAL) 150 MG tablet TAKE 3 TABLETS(450 MG) BY MOUTH DAILY 270 tablet 1     polyvinyl alcohol (ARTIFICIAL TEARS) 1.4 % ophthalmic solution Place 1 drop into both eyes every hour as needed for dry eyes 60 mL 0     QUEtiapine (SEROQUEL) 50 MG tablet Take 1 tablet (50 mg) by mouth at bedtime 30 tablet 0     topiramate (TOPAMAX) 50 MG tablet Take 1 tablet (50 mg) by mouth at bedtime 30 tablet 0     Vitamin D3 50 mcg (2000 units) tablet Take 1 tablet (50 mcg) by mouth daily 90 tablet 4        Controlled medications:   Remaining Tylenol #3 to be sent home     Past Medical History:   Past Medical History:   Diagnosis Date     Aspiration pneumonia (H) 02/2022     Depression      High cholesterol      Migraines      Pyloric ulcer, chronic      Sepsis (H) 08/10/2020     Trigeminal neuralgia      Physical Exam:   Vitals: /58   Pulse 90   Temp (!) 96.1  F (35.6  C)   Resp 18   Ht 1.676 m (5' 6\")   Wt 49.6 kg (109 lb 6.4 oz)   SpO2 94%   BMI 17.66 kg/m    BMI: Body mass index is 17.66 kg/m .  GENERAL APPEARANCE:  Alert, in no distress, thin  ENT:  Mouth and posterior oropharynx normal, moist mucous membranes  EYES:  EOM normal, conjunctiva and lids normal  RESP:  no respiratory distress  CV:  no edema  PSYCH:  oriented X 3, affect and mood normal     SNF labs: Labs done in SNF are in [facility]. Please refer to them using [facility]/Care Everywhere.    DISCHARGE PLAN:  Follow up labs: No labs orders/due  Medical Follow Up:      Follow up with primary care provider in 1-2 weeks  Discharge Services: Home Care:  Occupational Therapy, Physical Therapy, Registered Nurse, Home Health Aide, and       TOTAL DISCHARGE TIME:   Greater than 30 minutes  Electronically signed by:  DANIELLE Villanueva CNP       Documentation of Face to Face and " Certification for Home Health Services    I certify that services are/were furnished while this patient was under the care of a physician and that a physician or an allowed non-physician practitioner (NPP), had a face-to-face encounter that meets the physician face-to-face encounter requirements. The encounter was in whole, or in part, related to the primary reason for home health. The patient is confined to his/her home and needs intermittent skilled nursing, physical therapy, speech-language pathology, or the continued need for occupational therapy. A plan of care has been established by a physician and is periodically reviewed by a physician.  Date of Face-to-Face Encounter: 11/10/2023.    I certify that, based on my findings, the following services are medically necessary home health services: Nursing, Occupational Therapy, Physical Therapy, and Social Work.    My clinical findings support the need for the above skilled services because: Requires assistance of another person or specialized equipment to access medical services because patient: Is prone to wander/get lost without assistance. and Is unable to walk greater than 50 feet without rest...    Patient to re-establish plan of care with their PCP within 7-10 days after leaving the facility to reestablish care.  Medicare certified PECOS provider: DANIELLE Villanueva CNP  Date: November 10, 2023                  Sincerely,        DANIELLE Villanueva CNP

## 2023-11-15 ENCOUNTER — TELEPHONE (OUTPATIENT)
Dept: FAMILY MEDICINE | Facility: CLINIC | Age: 78
End: 2023-11-15

## 2023-11-15 ENCOUNTER — MEDICAL CORRESPONDENCE (OUTPATIENT)
Dept: HEALTH INFORMATION MANAGEMENT | Facility: CLINIC | Age: 78
End: 2023-11-15
Payer: COMMERCIAL

## 2023-11-15 NOTE — TELEPHONE ENCOUNTER
Home Care is calling regarding an established patient with [facility].    Requesting orders from: Mara Murillo  Provider is following patient: Yes  Is this a 60-day recertification request?  No    Orders Requested    Physical Therapy  Request for resumption in care.     1 time a week for 1 visit     Information was gathered and will be sent to provider for review.  RN will contact Home Care with information after provider review.  Confirmed ok to leave a detailed message with call back.  Contact information confirmed and updated as needed.    Flaquita Tee RN

## 2023-11-19 ENCOUNTER — MYC MEDICAL ADVICE (OUTPATIENT)
Dept: FAMILY MEDICINE | Facility: CLINIC | Age: 78
End: 2023-11-19
Payer: COMMERCIAL

## 2023-11-19 DIAGNOSIS — K21.9 GASTROESOPHAGEAL REFLUX DISEASE WITHOUT ESOPHAGITIS: Primary | ICD-10-CM

## 2023-11-20 ENCOUNTER — MYC REFILL (OUTPATIENT)
Dept: FAMILY MEDICINE | Facility: CLINIC | Age: 78
End: 2023-11-20
Payer: COMMERCIAL

## 2023-11-20 DIAGNOSIS — R19.7 DIARRHEA, UNSPECIFIED TYPE: ICD-10-CM

## 2023-11-20 RX ORDER — LOPERAMIDE HYDROCHLORIDE 2 MG/1
2 TABLET ORAL 4 TIMES DAILY PRN
Qty: 30 TABLET | Refills: 1 | Status: SHIPPED | OUTPATIENT
Start: 2023-11-20 | End: 2024-01-06

## 2023-11-20 RX ORDER — LOPERAMIDE HCL 2 MG
CAPSULE ORAL
OUTPATIENT
Start: 2023-11-20

## 2023-11-21 ENCOUNTER — TELEPHONE (OUTPATIENT)
Dept: FAMILY MEDICINE | Facility: CLINIC | Age: 78
End: 2023-11-21
Payer: COMMERCIAL

## 2023-11-21 NOTE — TELEPHONE ENCOUNTER
Home Care is calling regarding an established patient with [facility].     Requesting orders from: Mara Murillo  Provider is following patient: Yes  Is this a 60-day recertification request?  No    Verbal Orders Requested    Physical Therapy  Request for initial certification (first set of orders)   Frequency:  1x/wk for 4 wks  Then 1x every other week for 2 visits     Occupational Therapy  Request for initial evaluation and treatment (one time)  Verbal orders given    Confirmed ok to leave a detailed message with call back.  Contact information confirmed and updated as needed.  [facility] [phone]    Antonia Taveras RN

## 2023-11-22 NOTE — TELEPHONE ENCOUNTER
Left message regarding providers approval of all requested orders- message left on Elizabeth FINCH from [employer].    [facility], [facility], November 22, 2023, 1:59 PM

## 2023-11-24 ENCOUNTER — MEDICAL CORRESPONDENCE (OUTPATIENT)
Dept: HEALTH INFORMATION MANAGEMENT | Facility: CLINIC | Age: 78
End: 2023-11-24

## 2023-11-27 ENCOUNTER — PATIENT OUTREACH (OUTPATIENT)
Dept: CARE COORDINATION | Facility: CLINIC | Age: 78
End: 2023-11-27
Payer: COMMERCIAL

## 2023-11-27 RX ORDER — L. ACIDOPHILUS/L.BULGARICUS 1MM CELL
1 TABLET ORAL DAILY
COMMUNITY
Start: 2023-11-27 | End: 2023-12-11

## 2023-11-27 NOTE — LETTER
[facility] CARE COORDINATION  [address]  [place] [address]    November 28, 2023    Julisa Chaney  [address]  [place] [address]      Dear Julisa,    I am a clinic care coordinator who works with Mara Murillo MD with the [facility]. I recently tried to call and was unable to reach you. Below is a description of clinic care coordination and how I can further assist you.       The clinic care coordination team is made up of a registered nurse, , financial resource worker and community health worker who understand the health care system. The goal of clinic care coordination is to help you manage your health and improve access to the health care system. Our team works alongside your provider to assist you in determining your health and social needs. We can help you obtain health care and community resources, providing you with necessary information and education. We can work with you through any barriers and develop a care plan that helps coordinate and strengthen the communication between you and your care team.  Our services are voluntary and are offered without charge to you personally.    Please feel free to contact me with any questions or concerns regarding care coordination and what we can offer.      We are focused on providing you with the highest-quality healthcare experience possible.    Sincerely,     Aleshia Moraes RN  Clinic Care Coordination  [phone]

## 2023-11-27 NOTE — PROGRESS NOTES
Clinic Care Coordination Contact  [facility]/Voicemail    Clinical Data: Care Coordinator Outreach    Outreach Documentation Number of Outreach Attempt   11/27/2023   2:16 PM 1       Left message on patient's voicemail with call back information and requested return call.    Plan: Care Coordinator will try to reach patient again in 1-2 business days.

## 2023-11-28 NOTE — PROGRESS NOTES
Clinic Care Coordination Contact  [facility]/Voicemail    Clinical Data: Care Coordinator Outreach    Outreach Documentation Number of Outreach Attempt   11/27/2023   2:16 PM 1   11/28/2023   1:43 PM 2       Left message on patient's voicemail with call back information and requested return call.    Plan: Care Coordinator will send unable to contact letter with care coordinator contact information via [employer]. Care Coordinator will do no further outreaches at this time.      UT x 2

## 2023-11-29 ENCOUNTER — TELEPHONE (OUTPATIENT)
Dept: FAMILY MEDICINE | Facility: CLINIC | Age: 78
End: 2023-11-29
Payer: COMMERCIAL

## 2023-11-29 NOTE — TELEPHONE ENCOUNTER
11-29-23    Home Health Care    Reason for call:  verbal orders    Orders are needed for this patient.  Adding a skilled nursing eval for:  Skin assessment due to new wound on buttocks    Pt Provider: nicolas    Phone Number Homecare Nurse can be reached at: [phone]    Can we leave a detailed message on this number? YES      Could we send this information to you in [employer] or would you prefer to receive a phone call?:   Patient would prefer a phone call   Okay to leave a detailed message?: Yes at Other phone number:  [phone].

## 2023-11-30 ENCOUNTER — TELEPHONE (OUTPATIENT)
Dept: FAMILY MEDICINE | Facility: CLINIC | Age: 78
End: 2023-11-30

## 2023-11-30 DIAGNOSIS — G50.0 TRIGEMINAL NEURALGIA: ICD-10-CM

## 2023-11-30 DIAGNOSIS — G89.4 CHRONIC PAIN SYNDROME: ICD-10-CM

## 2023-11-30 RX ORDER — ACETAMINOPHEN AND CODEINE PHOSPHATE 300; 30 MG/1; MG/1
1 TABLET ORAL EVERY 6 HOURS PRN
Qty: 120 TABLET | Refills: 0 | Status: SHIPPED | OUTPATIENT
Start: 2023-11-30 | End: 2024-01-06

## 2023-11-30 NOTE — TELEPHONE ENCOUNTER
Daughter calling and stated that pt only had 30 tablets but would like the original 120 tablets. Daughter is going out of town and would like to pick it up by tomorrow if possible.

## 2023-11-30 NOTE — TELEPHONE ENCOUNTER
Daughter calling again. She is leaving town early on 12.1.23 and patient needs this medication before then.

## 2023-11-30 NOTE — TELEPHONE ENCOUNTER
Home Care is calling regarding an established patient with [facility].  Requesting orders from: Mara Murillo  Provider is following patient: Yes  Is this a 60-day recertification request?  No    Orders Requested    Occupational Therapy  Request for initial certification (first set of orders)   Frequency:  1x/wk for 5 wks    OT needed for: Upper body strengthening and ADLs    Information was gathered and will be sent to provider for review.  RN will contact Home Care with information after provider review.  Confirmed ok to leave a detailed message with call back.  Contact information confirmed and updated as needed.    Flaquita Tee RN

## 2023-12-04 ENCOUNTER — TELEPHONE (OUTPATIENT)
Dept: FAMILY MEDICINE | Facility: CLINIC | Age: 78
End: 2023-12-04
Payer: COMMERCIAL

## 2023-12-04 ENCOUNTER — MYC REFILL (OUTPATIENT)
Dept: FAMILY MEDICINE | Facility: CLINIC | Age: 78
End: 2023-12-04
Payer: COMMERCIAL

## 2023-12-04 DIAGNOSIS — G43.719 INTRACTABLE CHRONIC MIGRAINE WITHOUT AURA AND WITHOUT STATUS MIGRAINOSUS: ICD-10-CM

## 2023-12-04 DIAGNOSIS — G47.00 PERSISTENT INSOMNIA: ICD-10-CM

## 2023-12-04 DIAGNOSIS — R42 DIZZINESS: ICD-10-CM

## 2023-12-04 DIAGNOSIS — G50.0 TRIGEMINAL NEURALGIA: ICD-10-CM

## 2023-12-04 DIAGNOSIS — G89.4 CHRONIC PAIN SYNDROME: ICD-10-CM

## 2023-12-04 DIAGNOSIS — F32.0 CURRENT MILD EPISODE OF MAJOR DEPRESSIVE DISORDER WITHOUT PRIOR EPISODE (H): ICD-10-CM

## 2023-12-04 RX ORDER — LEVETIRACETAM 750 MG/1
750 TABLET ORAL 2 TIMES DAILY
Qty: 60 TABLET | Refills: 0 | Status: SHIPPED | OUTPATIENT
Start: 2023-12-04 | End: 2024-01-06

## 2023-12-04 RX ORDER — TOPIRAMATE 50 MG/1
50 TABLET, FILM COATED ORAL AT BEDTIME
Qty: 30 TABLET | Refills: 0 | Status: SHIPPED | OUTPATIENT
Start: 2023-12-04 | End: 2024-01-06

## 2023-12-04 RX ORDER — MECLIZINE HCL 12.5 MG 12.5 MG/1
12.5 TABLET ORAL 2 TIMES DAILY
Qty: 60 TABLET | Refills: 1 | Status: SHIPPED | OUTPATIENT
Start: 2023-12-04 | End: 2024-01-11

## 2023-12-04 RX ORDER — NORTRIPTYLINE HYDROCHLORIDE 50 MG/1
50 CAPSULE ORAL AT BEDTIME
Qty: 90 CAPSULE | Refills: 3 | Status: SHIPPED | OUTPATIENT
Start: 2023-12-04 | End: 2024-09-26

## 2023-12-04 RX ORDER — TOPIRAMATE 50 MG/1
50 TABLET, FILM COATED ORAL AT BEDTIME
Qty: 90 TABLET | OUTPATIENT
Start: 2023-12-04

## 2023-12-04 RX ORDER — LEVETIRACETAM 750 MG/1
750 TABLET ORAL 2 TIMES DAILY
Qty: 180 TABLET | OUTPATIENT
Start: 2023-12-04

## 2023-12-04 RX ORDER — MIRTAZAPINE 15 MG/1
15 TABLET, FILM COATED ORAL AT BEDTIME
Qty: 30 TABLET | Refills: 0 | Status: SHIPPED | OUTPATIENT
Start: 2023-12-04 | End: 2024-01-06

## 2023-12-04 NOTE — TELEPHONE ENCOUNTER
Home Care is calling regarding an established patient with [facility].       Requesting orders from: Mara Murillo  Provider is following patient: Yes  Is this a 60-day recertification request?  No    Orders Requested    Skilled Nursing  Request for initial certification (first set of orders)   Frequency:  Every other week for three occurences.    Wound Care Supplies    Cleanse the area with soap and water, dry, apply triad paste, cover with depend.     Confirmed ok to leave a detailed message with call back.  Contact information confirmed and updated as needed.    Gladys Pulido RN

## 2023-12-05 ENCOUNTER — MEDICAL CORRESPONDENCE (OUTPATIENT)
Dept: HEALTH INFORMATION MANAGEMENT | Facility: CLINIC | Age: 78
End: 2023-12-05
Payer: COMMERCIAL

## 2023-12-06 ENCOUNTER — MEDICAL CORRESPONDENCE (OUTPATIENT)
Dept: HEALTH INFORMATION MANAGEMENT | Facility: CLINIC | Age: 78
End: 2023-12-06
Payer: COMMERCIAL

## 2023-12-06 ENCOUNTER — MYC MEDICAL ADVICE (OUTPATIENT)
Dept: FAMILY MEDICINE | Facility: CLINIC | Age: 78
End: 2023-12-06
Payer: COMMERCIAL

## 2023-12-07 ENCOUNTER — MEDICAL CORRESPONDENCE (OUTPATIENT)
Dept: HEALTH INFORMATION MANAGEMENT | Facility: CLINIC | Age: 78
End: 2023-12-07
Payer: COMMERCIAL

## 2023-12-07 DIAGNOSIS — Z53.9 DIAGNOSIS NOT YET DEFINED: Primary | ICD-10-CM

## 2023-12-07 PROCEDURE — G0179 MD RECERTIFICATION HHA PT: HCPCS | Performed by: FAMILY MEDICINE

## 2023-12-08 NOTE — TELEPHONE ENCOUNTER
12/08/23  LM for pt to call back for assistance in scheduling an appt. Also sent [employer] message.  Keila

## 2023-12-10 ENCOUNTER — MYC MEDICAL ADVICE (OUTPATIENT)
Dept: FAMILY MEDICINE | Facility: CLINIC | Age: 78
End: 2023-12-10
Payer: COMMERCIAL

## 2023-12-18 ENCOUNTER — TELEPHONE (OUTPATIENT)
Dept: FAMILY MEDICINE | Facility: CLINIC | Age: 78
End: 2023-12-18
Payer: COMMERCIAL

## 2023-12-18 DIAGNOSIS — Z51.89 ENCOUNTER FOR WOUND CARE: Primary | ICD-10-CM

## 2023-12-18 NOTE — TELEPHONE ENCOUNTER
Farrukh sutton:[phone]    New Medication Request    Contacts         Type Contact Phone/Fax    12/18/2023 03:11 PM CST Phone (Incoming) farrukh sutton (Other)             What medication are you requesting?: Triad cream    Reason for medication request: Orders were given for the triad cream and when wound care orders were okayed but pt doesn't have the cream.    Have you taken this medication before?: No    Controlled Substance Agreement on file:   CSA -- Patient Level:     [Media Unavailable] Controlled Substance Agreement - Opioid - Scan on 7/11/2018   [Media Unavailable] Controlled Substance Agreement - Opioid - Scan on 9/12/2016         Patient offered an appointment? No    Preferred Pharmacy:   [id number] - [place], [place] - [address] [facility] [address]  [address]  [place] [address]  Phone: [phone] Fax: [phone]      Could we send this information to you in [facility] or would you prefer to receive a phone call?:   Patient would prefer a phone call   Okay to leave a detailed message?: Yes at Cell number on file:    Telephone Information:   Mobile [phone]

## 2023-12-20 RX ORDER — HYDROPHILIC CREAM
PASTE (GRAM) TOPICAL
Qty: 170 G | Refills: 1 | Status: SHIPPED | OUTPATIENT
Start: 2023-12-20 | End: 2024-04-15

## 2023-12-21 ENCOUNTER — MEDICAL CORRESPONDENCE (OUTPATIENT)
Dept: HEALTH INFORMATION MANAGEMENT | Facility: CLINIC | Age: 78
End: 2023-12-21
Payer: COMMERCIAL

## 2024-01-02 ENCOUNTER — TELEPHONE (OUTPATIENT)
Dept: FAMILY MEDICINE | Facility: CLINIC | Age: 79
End: 2024-01-02
Payer: COMMERCIAL

## 2024-01-02 NOTE — TELEPHONE ENCOUNTER
Home Care is calling regarding an established patient with [facility].       Requesting orders from: Mara Murillo  Provider is following patient: Yes  Is this a 60-day recertification request?  No    Orders Requested    HHA (Home Health Aide)  Request for initial certification (first set of orders)   Frequency:  3 visits    HHA not doing any bathing herself, cert is coming up at end of January so asking for 3 visits.    OT and RN there today and patient reported took her 1 1/2 to get dressed    Daughter on and off, questionable if helping with cares    Skilled nursing does not think family is helping          Confirmed ok to leave a detailed message with call back.  Contact information confirmed and updated as needed.    [facility] Care -[phone]    Kate Schroeder RN

## 2024-01-06 ENCOUNTER — MYC REFILL (OUTPATIENT)
Dept: FAMILY MEDICINE | Facility: CLINIC | Age: 79
End: 2024-01-06
Payer: COMMERCIAL

## 2024-01-06 DIAGNOSIS — E55.9 VITAMIN D DEFICIENCY: ICD-10-CM

## 2024-01-06 DIAGNOSIS — D50.0 IRON DEFICIENCY ANEMIA DUE TO CHRONIC BLOOD LOSS: ICD-10-CM

## 2024-01-06 DIAGNOSIS — K21.9 GASTROESOPHAGEAL REFLUX DISEASE WITHOUT ESOPHAGITIS: ICD-10-CM

## 2024-01-06 DIAGNOSIS — G43.719 INTRACTABLE CHRONIC MIGRAINE WITHOUT AURA AND WITHOUT STATUS MIGRAINOSUS: ICD-10-CM

## 2024-01-06 DIAGNOSIS — G47.00 PERSISTENT INSOMNIA: ICD-10-CM

## 2024-01-06 DIAGNOSIS — R42 DIZZINESS: ICD-10-CM

## 2024-01-06 DIAGNOSIS — G50.0 TRIGEMINAL NEURALGIA: ICD-10-CM

## 2024-01-06 DIAGNOSIS — F32.0 CURRENT MILD EPISODE OF MAJOR DEPRESSIVE DISORDER WITHOUT PRIOR EPISODE (H): ICD-10-CM

## 2024-01-06 DIAGNOSIS — G89.4 CHRONIC PAIN SYNDROME: ICD-10-CM

## 2024-01-06 DIAGNOSIS — R19.7 DIARRHEA, UNSPECIFIED TYPE: ICD-10-CM

## 2024-01-08 DIAGNOSIS — G43.719 INTRACTABLE CHRONIC MIGRAINE WITHOUT AURA AND WITHOUT STATUS MIGRAINOSUS: ICD-10-CM

## 2024-01-08 DIAGNOSIS — G47.00 PERSISTENT INSOMNIA: ICD-10-CM

## 2024-01-08 RX ORDER — MIRTAZAPINE 15 MG/1
15 TABLET, FILM COATED ORAL AT BEDTIME
Qty: 30 TABLET | Refills: 0 | Status: SHIPPED | OUTPATIENT
Start: 2024-01-08 | End: 2024-06-27

## 2024-01-08 RX ORDER — TOPIRAMATE 50 MG/1
50 TABLET, FILM COATED ORAL AT BEDTIME
Qty: 30 TABLET | Refills: 0 | Status: SHIPPED | OUTPATIENT
Start: 2024-01-08 | End: 2024-03-10

## 2024-01-08 RX ORDER — FERROUS SULFATE 325(65) MG
325 TABLET ORAL
Qty: 90 TABLET | Refills: 1 | Status: SHIPPED | OUTPATIENT
Start: 2024-01-08 | End: 2024-07-29

## 2024-01-08 RX ORDER — ACETAMINOPHEN AND CODEINE PHOSPHATE 300; 30 MG/1; MG/1
1 TABLET ORAL EVERY 6 HOURS PRN
Qty: 120 TABLET | Refills: 0 | Status: ON HOLD | OUTPATIENT
Start: 2024-01-08 | End: 2024-01-24

## 2024-01-08 RX ORDER — LEVETIRACETAM 750 MG/1
750 TABLET ORAL 2 TIMES DAILY
Qty: 180 TABLET | OUTPATIENT
Start: 2024-01-08

## 2024-01-08 RX ORDER — OMEPRAZOLE 40 MG/1
40 CAPSULE, DELAYED RELEASE ORAL DAILY
Qty: 90 CAPSULE | Refills: 1 | Status: SHIPPED | OUTPATIENT
Start: 2024-01-08 | End: 2024-05-03 | Stop reason: SINTOL

## 2024-01-08 RX ORDER — TOPIRAMATE 50 MG/1
50 TABLET, FILM COATED ORAL AT BEDTIME
Qty: 90 TABLET | OUTPATIENT
Start: 2024-01-08

## 2024-01-08 RX ORDER — LEVETIRACETAM 750 MG/1
750 TABLET ORAL 2 TIMES DAILY
Qty: 60 TABLET | Refills: 0 | Status: SHIPPED | OUTPATIENT
Start: 2024-01-08 | End: 2024-03-03

## 2024-01-08 RX ORDER — MECLIZINE HCL 12.5 MG 12.5 MG/1
12.5 TABLET ORAL 2 TIMES DAILY
Qty: 60 TABLET | Refills: 1 | OUTPATIENT
Start: 2024-01-08

## 2024-01-08 RX ORDER — CHOLECALCIFEROL (VITAMIN D3) 50 MCG
1 TABLET ORAL DAILY
Qty: 90 TABLET | Refills: 1 | Status: SHIPPED | OUTPATIENT
Start: 2024-01-08 | End: 2024-06-27

## 2024-01-08 RX ORDER — LOPERAMIDE HYDROCHLORIDE 2 MG/1
2 TABLET ORAL 4 TIMES DAILY PRN
Qty: 30 TABLET | Refills: 1 | Status: SHIPPED | OUTPATIENT
Start: 2024-01-08 | End: 2024-02-05 | Stop reason: ALTCHOICE

## 2024-01-11 ENCOUNTER — MYC REFILL (OUTPATIENT)
Dept: FAMILY MEDICINE | Facility: CLINIC | Age: 79
End: 2024-01-11

## 2024-01-11 ENCOUNTER — OFFICE VISIT (OUTPATIENT)
Dept: FAMILY MEDICINE | Facility: CLINIC | Age: 79
End: 2024-01-11
Payer: COMMERCIAL

## 2024-01-11 VITALS
OXYGEN SATURATION: 96 % | TEMPERATURE: 97.3 F | DIASTOLIC BLOOD PRESSURE: 62 MMHG | HEART RATE: 68 BPM | SYSTOLIC BLOOD PRESSURE: 100 MMHG

## 2024-01-11 DIAGNOSIS — Z79.899 ENCOUNTER FOR LONG-TERM CURRENT USE OF MEDICATION: ICD-10-CM

## 2024-01-11 DIAGNOSIS — F41.9 ANXIETY: ICD-10-CM

## 2024-01-11 DIAGNOSIS — F32.0 CURRENT MILD EPISODE OF MAJOR DEPRESSIVE DISORDER WITHOUT PRIOR EPISODE (H): ICD-10-CM

## 2024-01-11 DIAGNOSIS — R19.7 DIARRHEA, UNSPECIFIED TYPE: ICD-10-CM

## 2024-01-11 DIAGNOSIS — F11.20 CONTINUOUS OPIOID DEPENDENCE (H): ICD-10-CM

## 2024-01-11 DIAGNOSIS — Z23 NEED FOR PROPHYLACTIC VACCINATION AGAINST HEPATITIS A: ICD-10-CM

## 2024-01-11 DIAGNOSIS — G89.4 CHRONIC PAIN SYNDROME: ICD-10-CM

## 2024-01-11 DIAGNOSIS — Z09 ENCOUNTER FOR EXAMINATION FOLLOWING TREATMENT AT HOSPITAL: Primary | ICD-10-CM

## 2024-01-11 DIAGNOSIS — D50.0 IRON DEFICIENCY ANEMIA DUE TO CHRONIC BLOOD LOSS: ICD-10-CM

## 2024-01-11 DIAGNOSIS — G40.909 SEIZURE DISORDER (H): ICD-10-CM

## 2024-01-11 DIAGNOSIS — G50.0 TRIGEMINAL NEURALGIA: ICD-10-CM

## 2024-01-11 DIAGNOSIS — H57.89 EYE DRAINAGE: ICD-10-CM

## 2024-01-11 DIAGNOSIS — E55.9 VITAMIN D DEFICIENCY: ICD-10-CM

## 2024-01-11 DIAGNOSIS — Z23 NEED FOR SHINGLES VACCINE: ICD-10-CM

## 2024-01-11 DIAGNOSIS — Z29.11 NEED FOR VACCINATION AGAINST RESPIRATORY SYNCYTIAL VIRUS: ICD-10-CM

## 2024-01-11 DIAGNOSIS — E46 MALNUTRITION, UNSPECIFIED TYPE (H): ICD-10-CM

## 2024-01-11 DIAGNOSIS — Z00.00 ENCOUNTER FOR MEDICARE ANNUAL WELLNESS EXAM: ICD-10-CM

## 2024-01-11 PROBLEM — E86.0 DEHYDRATION: Status: RESOLVED | Noted: 2023-10-22 | Resolved: 2024-01-11

## 2024-01-11 PROBLEM — R33.9 URINARY RETENTION: Status: RESOLVED | Noted: 2023-07-06 | Resolved: 2024-01-11

## 2024-01-11 PROBLEM — J18.9 PNEUMONIA OF RIGHT LOWER LOBE DUE TO INFECTIOUS ORGANISM: Status: RESOLVED | Noted: 2023-07-02 | Resolved: 2024-01-11

## 2024-01-11 PROCEDURE — G0439 PPPS, SUBSEQ VISIT: HCPCS | Performed by: FAMILY MEDICINE

## 2024-01-11 PROCEDURE — 99214 OFFICE O/P EST MOD 30 MIN: CPT | Mod: 25 | Performed by: FAMILY MEDICINE

## 2024-01-11 RX ORDER — RESPIRATORY SYNCYTIAL VIRUS VACCINE 120MCG/0.5
0.5 KIT INTRAMUSCULAR ONCE
Qty: 1 EACH | Refills: 0 | Status: SHIPPED | OUTPATIENT
Start: 2024-01-11 | End: 2024-01-11

## 2024-01-11 ASSESSMENT — ACTIVITIES OF DAILY LIVING (ADL)
CURRENT_FUNCTION: MEDICATION ADMINISTRATION REQUIRES ASSISTANCE
CURRENT_FUNCTION: PREPARING MEALS REQUIRES ASSISTANCE
CURRENT_FUNCTION: HOUSEWORK REQUIRES ASSISTANCE
CURRENT_FUNCTION: SHOPPING REQUIRES ASSISTANCE
CURRENT_FUNCTION: LAUNDRY REQUIRES ASSISTANCE

## 2024-01-11 ASSESSMENT — ANXIETY QUESTIONNAIRES
GAD7 TOTAL SCORE: 0
5. BEING SO RESTLESS THAT IT IS HARD TO SIT STILL: NOT AT ALL
3. WORRYING TOO MUCH ABOUT DIFFERENT THINGS: NOT AT ALL
6. BECOMING EASILY ANNOYED OR IRRITABLE: NOT AT ALL
4. TROUBLE RELAXING: NOT AT ALL
GAD7 TOTAL SCORE: 0
GAD7 TOTAL SCORE: 0
7. FEELING AFRAID AS IF SOMETHING AWFUL MIGHT HAPPEN: NOT AT ALL
IF YOU CHECKED OFF ANY PROBLEMS ON THIS QUESTIONNAIRE, HOW DIFFICULT HAVE THESE PROBLEMS MADE IT FOR YOU TO DO YOUR WORK, TAKE CARE OF THINGS AT HOME, OR GET ALONG WITH OTHER PEOPLE: NOT DIFFICULT AT ALL
7. FEELING AFRAID AS IF SOMETHING AWFUL MIGHT HAPPEN: NOT AT ALL
1. FEELING NERVOUS, ANXIOUS, OR ON EDGE: NOT AT ALL
2. NOT BEING ABLE TO STOP OR CONTROL WORRYING: NOT AT ALL
8. IF YOU CHECKED OFF ANY PROBLEMS, HOW DIFFICULT HAVE THESE MADE IT FOR YOU TO DO YOUR WORK, TAKE CARE OF THINGS AT HOME, OR GET ALONG WITH OTHER PEOPLE?: NOT DIFFICULT AT ALL

## 2024-01-11 ASSESSMENT — PATIENT HEALTH QUESTIONNAIRE - PHQ9
10. IF YOU CHECKED OFF ANY PROBLEMS, HOW DIFFICULT HAVE THESE PROBLEMS MADE IT FOR YOU TO DO YOUR WORK, TAKE CARE OF THINGS AT HOME, OR GET ALONG WITH OTHER PEOPLE: NOT DIFFICULT AT ALL
SUM OF ALL RESPONSES TO PHQ QUESTIONS 1-9: 1
SUM OF ALL RESPONSES TO PHQ QUESTIONS 1-9: 1

## 2024-01-11 NOTE — LETTER

## 2024-01-11 NOTE — PROGRESS NOTES
6 Julisa was seen today for hospital f/u.    Diagnoses and all orders for this visit:    Encounter for examination following treatment at hospital    Encounter for Medicare annual wellness exam    Need for shingles vaccine  -     zoster vaccine recombinant adjuvanted (SHINGRIX) injection; Inject 0.5 mLs into the muscle once for 1 dose Pharmacist administered    Need for prophylactic vaccination against hepatitis A  -     hepatitis A vaccine (VAQTA) 50 UNIT/ML injection; Inject 1 mL (50 Units) into the muscle once for 1 dose    Need for vaccination against respiratory syncytial virus  -     respiratory syncytial virus vaccine, bivalent (ABRYSVO) injection; Inject 0.5 mLs into the muscle once for 1 dose    Chronic pain syndrome  Comments:  continue current treat . CSA updated today  Orders:  -     [id number] - Urine Drug Confirmation Panel (Comprehensive); Future    Seizure disorder (H)  Comments:  stable    Malnutrition, unspecified type (H24)  Comments:  weight stable ,luzma improving with remeron and seroquel    Continuous opioid dependence (H)    Current mild episode of major depressive disorder without prior episode (H24)    Trigeminal neuralgia  Comments:  stable    Diarrhea, unspecified type  Comments:  will adv  Orders:  -     Adult GI  Referral - Consult Only; Future    Eye drainage  Comments:  most likely blocked tear duct , referral to opth for further eval and treatment  Orders:  -     Adult Eye  Referral; Future    Encounter for long-term current use of medication    Other orders  -     INFLUENZA VACCINE 65+ (FLUZONE HD); Future  -     COVID-19 12+ (2023-24) (PFIZER); Future  -     PRIMARY CARE FOLLOW-UP SCHEDULING; Future         Subjective     Julisa Chaney 78 year old  presenting for the following health issues:    Patient presents with:  Hospital F/U           1/11/2024    12:14 PM   Additional Questions   Roomed by Kyleigh CARY       History of Present Illness       Headaches:   Since  "the patient's last clinic visit, headaches are: no change  The patient is getting headaches:  Not often  She is not able to do normal daily activities when she has a migraine.  The patient is taking the following rescue/relief medications:  Other   Patient states \"I get some relief\" from the rescue/relief medications.   The patient is taking the following medications to prevent migraines:  Topomax  In the past 4 weeks, the patient has gone to an Urgent Care or Emergency Room 0 times times due to headaches.    She eats 0-1 servings of fruits and vegetables daily.She consumes 0 sweetened beverage(s) daily.She exercises with enough effort to increase her heart rate 9 or less minutes per day.  She exercises with enough effort to increase her heart rate 3 or less days per week.   She is not taking prescribed medications regularly due to None.  Healthy Habits:     In general, how would you rate your overall health?  Fair    Frequency of exercise:  None    Duration of exercise:  Other    Taking medications regularly:  Yes    Barriers to taking medications:  None    Medication side effects:  None    Ability to successfully perform activities of daily living:  Laundry requires assistance, shopping requires assistance, medication administration requires assistance, preparing meals requires assistance and housework requires assistance    Home Safety:  No safety concerns identified    Hearing Impairment:  Difficulty following a conversation in a noisy restaurant or crowded room    In the past 6 months, have you been bothered by leaking of urine? Yes    In general, how would you rate your overall mental or emotional health?  Fair    Additional concerns today:  Yes         Have you ever done Advance Care Planning? (For example, a Health Directive, POLST, or a discussion with a medical provider or your loved ones about your wishes): Yes, patient states has an Advance Care Planning document and will bring a copy to the clinic.     "   Fall risk  Fallen 2 or more times in the past year?: No  Any fall with injury in the past year?: Yes  Timed Up and Go Test (>13.5 is fall risk; contact physician) :  (wheel chair bound) not done     Cognitive Screening   1) Repeat 3 items (Leader, Season, Table)    2) Clock draw: ABNORMAL patient very weak and fragile   3) 3 item recall: Recalls 2 objects   Results: ABNORMAL clock, 1-2 items recalled: PROBABLE COGNITIVE IMPAIRMENT, **INFORM PROVIDER**    Mini-CogTM Copyright MARIO Hill. Licensed by the author for use in [facility]; reprinted with permission ([email or web address]@[email or web address]). All rights reserved.          Depression and Anxiety Follow-Up  How are you doing with your depression since your last visit? stable  How are you doing with your anxiety since your last visit?  Improved   Are you having other symptoms that might be associated with depression or anxiety? No  Have you had a significant life event? Health Concerns   Do you have any concerns with your use of alcohol or other drugs? No    Social History     Tobacco Use    Smoking status: Former     Packs/day: 1.00     Years: 50.00     Additional pack years: 0.00     Total pack years: 50.00     Types: Cigarettes     Quit date: 2014     Years since quittin.1    Smokeless tobacco: Never   Substance Use Topics    Alcohol use: No    Drug use: No         2022     1:44 PM 2022     1:59 PM 2024    12:02 PM   PHQ   PHQ-9 Total Score 15 0 1   Q9: Thoughts of better off dead/self-harm past 2 weeks Not at all Not at all Not at all         2020     2:00 PM 6/15/2020    12:00 PM 2024    12:02 PM   YUE-7 SCORE   Total Score   0 (minimal anxiety)   Total Score 7 7 0         Suicide Assessment Five-step Evaluation and Treatment (SAFE-T)    Pain History:    Pain History:  When did you first notice your pain? - Chronic Pain   Have you seen this provider for your pain in the past?   Yes   Where in your body do you have pain? Left side of the  face from trigeminal neuralgia   Are you seeing anyone else for your pain? Yes - physical therapy , home based   Diarrhea  Onset/Duration: October   Description:       Consistency of stool: watery       Blood in stool: No       Number of loose stools past 24 hours: 3 times per day mostly after meals   Progression of Symptoms: same  Accompanying signs and symptoms:       Fever: No       Nausea/Vomiting: No       Abdominal pain: No       Weight loss: No       Episodes of constipation: No  History   Ill contacts: No  Recent use of antibiotics: No  Recent travels: No  Recent medication-new or changes(Rx or OTC): No  Precipitating or alleviating factors: not sure     Therapies tried and outcome: lomotil  .hpi  Wt Readings from Last 3 Encounters:   11/10/23 49.6 kg (109 lb 6.4 oz)   11/06/23 49.6 kg (109 lb 6.4 oz)   11/02/23 50.1 kg (110 lb 6.4 oz)        Patient Active Problem List   Diagnosis    Osteopenia    Hyperlipidemia    Migraine headache    Trigeminal neuralgia    Counseling regarding advanced directives and goals of care    Controlled substance agreement signed 6/1/23    Hypercalcemia    Chronic pain syndrome    Iron deficiency anemia due to chronic blood loss    Abnormal chest CT    Mild major depression (H)    Pyloric ulcer, chronic    Pyogenic brain abscess    F11.2 - Continuous opioid dependence (H)    Malnutrition, unspecified type (H24)    Current mild episode of major depressive disorder without prior episode (H24)    Falls frequently    Hypokalemia    Diarrhea, unspecified type    Failure to thrive in adult    Seizure disorder (H)      Social History     Social History Narrative    Lives alone in the house, relay in TV dinner  grandkids help with house maintenance  Daughter lives close by.  Mara Murillo MD 7/9/2018 1:49 PM        Current Outpatient Medications   Medication    acetaminophen-codeine (TYLENOL #3) 300-30 MG per tablet    desonide (DESOWEN) 0.05 % external cream    ferrous sulfate (FEROSUL)  325 (65 Fe) MG tablet    fiber modular (BANATROL TF) packet    gabapentin (NEURONTIN) 100 MG capsule    levETIRAcetam (KEPPRA) 750 MG tablet    loperamide (IMODIUM A-D) 2 MG tablet    mirtazapine (REMERON) 15 MG tablet    Multiple Vitamin (MULTIVITAMIN) TABS    nortriptyline (PAMELOR) 50 MG capsule    omeprazole (PRILOSEC) 40 MG DR capsule    OXcarbazepine (TRILEPTAL) 150 MG tablet    polyvinyl alcohol (ARTIFICIAL TEARS) 1.4 % ophthalmic solution    topiramate (TOPAMAX) 50 MG tablet    Vitamin D3 50 mcg (2000 units) tablet    Wound Dressings (TRIAD HYDROPHILIC WOUND DRESSI) PSTE    QUEtiapine (SEROQUEL) 50 MG tablet     No current facility-administered medications for this visit.            Review of Systems   Constitutional, HEENT, cardiovascular, pulmonary,  , musculoskeletal, , skin, endocrine and psych systems are negative, except as otherwise noted.      Wt Readings from Last 3 Encounters:   11/10/23 49.6 kg (109 lb 6.4 oz)   11/06/23 49.6 kg (109 lb 6.4 oz)   11/02/23 50.1 kg (110 lb 6.4 oz)      Objective      /62   Pulse 68   Temp 97.3  F (36.3  C) (Temporal)   SpO2 96%    Physical Exam   GENERAL: healthy, alert and no distress  EYES: Eyes grossly normal to inspection and conjunctiva/corneas- clear colored discharge present bilateral  NECK: no adenopathy, no asymmetry, masses, or scars and thyroid normal to palpation  RESP: lungs clear to auscultation - no rales, rhonchi or wheezes  CV: regular rate and rhythm, normal S1 S2, no S3 or S4, no murmur, click or rub, no peripheral edema and peripheral pulses strong    Lab Requisition on 10/30/2023   Component Date Value Ref Range Status    Magnesium 10/30/2023 2.0  1.7 - 2.3 mg/dL Final    Potassium 10/30/2023 3.7  3.4 - 5.3 mmol/L Final     In addition to the preventive visit, 25  minutes of the appointment were spent evaluating and developing a treatment plan for her additional concern(s).    Mara Murillo MD 1/11/2024    [facility]  [facility].  [phone]

## 2024-01-11 NOTE — LETTER

## 2024-01-11 NOTE — PATIENT INSTRUCTIONS
Please take one tab imodium every day till we follow with Gastroenterologist   No other change in your medication today     Patient Education   Personalized Prevention Plan  You are due for the preventive services outlined below.  Your care team is available to assist you in scheduling these services.  If you have already completed any of these items, please share that information with your care team to update in your medical record.  Health Maintenance Due   Topic Date Due     Osteoporosis Screening  Never done     RSV VACCINE (Pregnancy & 60+) (1 - 1-dose 60+ series) Never done     Zoster (Shingles) Vaccine (2 of 3) 01/02/2008     Hepatitis A Vaccine (2 of 2 - Risk 2-dose series) 10/07/2010     LUNG CANCER SCREENING  01/25/2018     CONTROLLED SUBSTANCE AGREEMENT FOR CHRONIC PAIN MANAGEMENT  07/11/2019     URINE DRUG SCREEN  02/17/2023     Flu Vaccine (1) 09/01/2023     COVID-19 Vaccine (6 - 2023-24 season) 09/01/2023

## 2024-01-12 ENCOUNTER — DOCUMENTATION ONLY (OUTPATIENT)
Dept: GASTROENTEROLOGY | Facility: CLINIC | Age: 79
End: 2024-01-12
Payer: COMMERCIAL

## 2024-01-12 RX ORDER — QUETIAPINE FUMARATE 50 MG/1
50 TABLET, FILM COATED ORAL AT BEDTIME
Qty: 30 TABLET | Refills: 0 | Status: SHIPPED | OUTPATIENT
Start: 2024-01-12 | End: 2024-03-17

## 2024-01-12 RX ORDER — FERROUS SULFATE 325(65) MG
325 TABLET ORAL
Qty: 90 TABLET | Refills: 1 | OUTPATIENT
Start: 2024-01-12

## 2024-01-12 RX ORDER — CHOLECALCIFEROL (VITAMIN D3) 50 MCG
1 TABLET ORAL DAILY
Qty: 90 TABLET | Refills: 1 | OUTPATIENT
Start: 2024-01-12

## 2024-01-12 NOTE — PROGRESS NOTES
Called [facility] to request that they fax over medical records pertaining to recent referral.      [facility] Representative noted that they would send two consults from 2023, an x-ray imaging report from 2020, and EGD with pathology & colonoscopy reports from 2020.     Clinic Information:  [facility]  Phone #: [phone] extension [phone]

## 2024-01-12 NOTE — TELEPHONE ENCOUNTER
This refill request is a duplicate request, previously received or sent.  Sent denial notification to pharmacy.  Flaquita Tee RN  [facility]

## 2024-01-15 ENCOUNTER — TELEPHONE (OUTPATIENT)
Dept: GASTROENTEROLOGY | Facility: CLINIC | Age: 79
End: 2024-01-15
Payer: COMMERCIAL

## 2024-01-15 NOTE — TELEPHONE ENCOUNTER
REFERRAL INFORMATION:  Referring Provider: Dr. Mara Murillo  Referring Clinic: [facility]  Reason for Visit/Diagnosis: Diarrhea     FUTURE VISIT INFORMATION:  Appointment Date: 2/20/2024  Appointment Time: 1 PM     NOTES STATUS DETAILS   OFFICE NOTE from Referring Provider Internal [facility]:  1/11/24, 6/1/23 - [facility] OV with Dr. Murillo   OFFICE NOTE from Other Specialist N/A    HOSPITAL DISCHARGE SUMMARY/  ED VISITS Internal [facility]:  10/25/23 - ED OV with Dr. Rainey  10/22/23 - Admission with Dr. Esteves  10/16/23 - ED OV with Dr. Rainey  2/6/22 - Admission with Dr. Hernandez  8/8/20 - Admission with Dr. Coffey    * Note [facility] Consults within ED  visits and Admissions   OPERATIVE REPORT N/A    MEDICATION LIST Internal         ENDOSCOPY  Received / Internal [facility]:  9/18/20 - EGD    [facility]:  8/13/20 - EGD   COLONOSCOPY Internal [facility]:  8/13/20 - Colonoscopy   STOOL TESTING Internal [facility]:  10/22/23 - C. Diff   PERTINENT LABS Internal    PATHOLOGY REPORTS (RELATED) Internal [facility]:  8/13/20 - Colon (Case: [id number])   IMAGING (CT, MRI, EGD, MRCP, Small Bowel Follow Through/SBT, MR/CT Enterography) Internal [facility]:  10/22/23 - CT Cervical Spine  7/3/23 - XR Video Swallow  9/28/22 - XR Abdomen  7/19/22, 8/8/20 - CT Abd/Pelvis  8/11/20 - US Abdomen

## 2024-01-15 NOTE — TELEPHONE ENCOUNTER
[facility] Call Center    Phone Message    May a detailed message be left on voicemail: Yes    Reason for Call: Other: Patient is currently scheduled on 2/20, as visit type New GI Urgent. This is outside the expected timeline for this referral. Patient has been added to the waitlist.  Daughter declined virtual and only wants [facility] or [place] locations.     Action Taken: Message routed to:  Other: GI REFERRAL TRIAGE POOL     Travel Screening: Not Applicable

## 2024-01-17 ENCOUNTER — HOSPITAL ENCOUNTER (INPATIENT)
Facility: HOSPITAL | Age: 79
LOS: 7 days | Discharge: NURSING, REHAB OR LONG-TERM CARE | DRG: 391 | End: 2024-01-24
Attending: EMERGENCY MEDICINE | Admitting: INTERNAL MEDICINE
Payer: COMMERCIAL

## 2024-01-17 ENCOUNTER — APPOINTMENT (OUTPATIENT)
Dept: RADIOLOGY | Facility: HOSPITAL | Age: 79
DRG: 391 | End: 2024-01-17
Attending: EMERGENCY MEDICINE
Payer: COMMERCIAL

## 2024-01-17 ENCOUNTER — APPOINTMENT (OUTPATIENT)
Dept: CT IMAGING | Facility: HOSPITAL | Age: 79
DRG: 391 | End: 2024-01-17
Attending: EMERGENCY MEDICINE
Payer: COMMERCIAL

## 2024-01-17 ENCOUNTER — MYC MEDICAL ADVICE (OUTPATIENT)
Dept: FAMILY MEDICINE | Facility: CLINIC | Age: 79
End: 2024-01-17

## 2024-01-17 DIAGNOSIS — R62.7 ADULT FAILURE TO THRIVE: ICD-10-CM

## 2024-01-17 DIAGNOSIS — R19.7 DIARRHEA, UNSPECIFIED TYPE: Primary | ICD-10-CM

## 2024-01-17 DIAGNOSIS — G50.0 TRIGEMINAL NEURALGIA: ICD-10-CM

## 2024-01-17 DIAGNOSIS — E87.6 HYPOKALEMIA: ICD-10-CM

## 2024-01-17 DIAGNOSIS — K52.831 COLITIS, COLLAGENOUS: ICD-10-CM

## 2024-01-17 DIAGNOSIS — G89.4 CHRONIC PAIN SYNDROME: ICD-10-CM

## 2024-01-17 LAB
ADV 40+41 DNA STL QL NAA+NON-PROBE: NEGATIVE
ALBUMIN SERPL BCG-MCNC: 3.3 G/DL (ref 3.5–5.2)
ALBUMIN UR-MCNC: 30 MG/DL
ALP SERPL-CCNC: 140 U/L (ref 40–150)
ALT SERPL W P-5'-P-CCNC: 8 U/L (ref 0–50)
ANION GAP SERPL CALCULATED.3IONS-SCNC: 11 MMOL/L (ref 7–15)
APPEARANCE UR: CLEAR
AST SERPL W P-5'-P-CCNC: 12 U/L (ref 0–45)
ASTRO TYP 1-8 RNA STL QL NAA+NON-PROBE: NEGATIVE
BASOPHILS # BLD AUTO: 0.1 10E3/UL (ref 0–0.2)
BASOPHILS NFR BLD AUTO: 1 %
BILIRUB DIRECT SERPL-MCNC: <0.2 MG/DL (ref 0–0.3)
BILIRUB SERPL-MCNC: <0.2 MG/DL
BILIRUB UR QL STRIP: NEGATIVE
BUN SERPL-MCNC: 11.9 MG/DL (ref 8–23)
C CAYETANENSIS DNA STL QL NAA+NON-PROBE: NEGATIVE
C DIFF TOX B STL QL: NEGATIVE
CALCIUM SERPL-MCNC: 9.2 MG/DL (ref 8.8–10.2)
CAMPYLOBACTER DNA SPEC NAA+PROBE: NEGATIVE
CHLORIDE SERPL-SCNC: 106 MMOL/L (ref 98–107)
COLOR UR AUTO: YELLOW
CORTIS SERPL-MCNC: 23.8 UG/DL
CREAT SERPL-MCNC: 0.76 MG/DL (ref 0.51–0.95)
CRYPTOSP DNA STL QL NAA+NON-PROBE: NEGATIVE
DEPRECATED HCO3 PLAS-SCNC: 29 MMOL/L (ref 22–29)
E COLI O157 DNA STL QL NAA+NON-PROBE: NORMAL
E HISTOLYT DNA STL QL NAA+NON-PROBE: NEGATIVE
EAEC ASTA GENE ISLT QL NAA+PROBE: NEGATIVE
EC STX1+STX2 GENES STL QL NAA+NON-PROBE: NEGATIVE
EGFRCR SERPLBLD CKD-EPI 2021: 80 ML/MIN/1.73M2
EOSINOPHIL # BLD AUTO: 0.1 10E3/UL (ref 0–0.7)
EOSINOPHIL NFR BLD AUTO: 3 %
EPEC EAE GENE STL QL NAA+NON-PROBE: NEGATIVE
ERYTHROCYTE [DISTWIDTH] IN BLOOD BY AUTOMATED COUNT: 15.1 % (ref 10–15)
ETEC LTA+ST1A+ST1B TOX ST NAA+NON-PROBE: NEGATIVE
FLUAV RNA SPEC QL NAA+PROBE: NEGATIVE
FLUBV RNA RESP QL NAA+PROBE: NEGATIVE
G LAMBLIA DNA STL QL NAA+NON-PROBE: NEGATIVE
GLUCOSE SERPL-MCNC: 94 MG/DL (ref 70–99)
GLUCOSE UR STRIP-MCNC: NEGATIVE MG/DL
HCT VFR BLD AUTO: 43.9 % (ref 35–47)
HGB BLD-MCNC: 12.7 G/DL (ref 11.7–15.7)
HGB UR QL STRIP: NEGATIVE
HOLD SPECIMEN: NORMAL
HOLD SPECIMEN: NORMAL
IMM GRANULOCYTES # BLD: 0 10E3/UL
IMM GRANULOCYTES NFR BLD: 0 %
KETONES UR STRIP-MCNC: 10 MG/DL
LACTATE SERPL-SCNC: 0.8 MMOL/L (ref 0.7–2)
LEUKOCYTE ESTERASE UR QL STRIP: NEGATIVE
LEVETIRACETAM SERPL-MCNC: 63.5 ΜG/ML (ref 10–40)
LIPASE SERPL-CCNC: 7 U/L (ref 13–60)
LYMPHOCYTES # BLD AUTO: 1.8 10E3/UL (ref 0.8–5.3)
LYMPHOCYTES NFR BLD AUTO: 34 %
MAGNESIUM SERPL-MCNC: 1.9 MG/DL (ref 1.7–2.3)
MCH RBC QN AUTO: 25.2 PG (ref 26.5–33)
MCHC RBC AUTO-ENTMCNC: 28.9 G/DL (ref 31.5–36.5)
MCV RBC AUTO: 87 FL (ref 78–100)
MONOCYTES # BLD AUTO: 0.6 10E3/UL (ref 0–1.3)
MONOCYTES NFR BLD AUTO: 11 %
NEUTROPHILS # BLD AUTO: 2.7 10E3/UL (ref 1.6–8.3)
NEUTROPHILS NFR BLD AUTO: 51 %
NITRATE UR QL: NEGATIVE
NOROVIRUS GI+II RNA STL QL NAA+NON-PROBE: NEGATIVE
NRBC # BLD AUTO: 0 10E3/UL
NRBC BLD AUTO-RTO: 0 /100
P SHIGELLOIDES DNA STL QL NAA+NON-PROBE: NEGATIVE
PH UR STRIP: 6 [PH] (ref 5–7)
PHOSPHATE SERPL-MCNC: 3.5 MG/DL (ref 2.5–4.5)
PLATELET # BLD AUTO: 468 10E3/UL (ref 150–450)
POTASSIUM SERPL-SCNC: 2.8 MMOL/L (ref 3.4–5.3)
PROCALCITONIN SERPL IA-MCNC: 0.05 NG/ML
PROCALCITONIN SERPL IA-MCNC: 0.1 NG/ML
PROT SERPL-MCNC: 7 G/DL (ref 6.4–8.3)
RBC # BLD AUTO: 5.03 10E6/UL (ref 3.8–5.2)
RBC URINE: <1 /HPF
RSV RNA SPEC NAA+PROBE: NEGATIVE
RVA RNA STL QL NAA+NON-PROBE: NEGATIVE
SALMONELLA SP RPOD STL QL NAA+PROBE: NEGATIVE
SAPO I+II+IV+V RNA STL QL NAA+NON-PROBE: NEGATIVE
SARS-COV-2 RNA RESP QL NAA+PROBE: NEGATIVE
SHIGELLA SP+EIEC IPAH ST NAA+NON-PROBE: NEGATIVE
SODIUM SERPL-SCNC: 140 MMOL/L (ref 135–145)
SODIUM SERPL-SCNC: 145 MMOL/L (ref 135–145)
SODIUM SERPL-SCNC: 146 MMOL/L (ref 135–145)
SP GR UR STRIP: 1.03 (ref 1–1.03)
UROBILINOGEN UR STRIP-MCNC: <2 MG/DL
V CHOLERAE DNA SPEC QL NAA+PROBE: NEGATIVE
VIBRIO DNA SPEC NAA+PROBE: NEGATIVE
VIT B12 SERPL-MCNC: 339 PG/ML (ref 232–1245)
WBC # BLD AUTO: 5.2 10E3/UL (ref 4–11)
WBC URINE: <1 /HPF
Y ENTEROCOL DNA STL QL NAA+PROBE: NEGATIVE

## 2024-01-17 PROCEDURE — 74176 CT ABD & PELVIS W/O CONTRAST: CPT

## 2024-01-17 PROCEDURE — 82374 ASSAY BLOOD CARBON DIOXIDE: CPT | Performed by: EMERGENCY MEDICINE

## 2024-01-17 PROCEDURE — 81001 URINALYSIS AUTO W/SCOPE: CPT | Performed by: EMERGENCY MEDICINE

## 2024-01-17 PROCEDURE — 82533 TOTAL CORTISOL: CPT | Performed by: INTERNAL MEDICINE

## 2024-01-17 PROCEDURE — 96375 TX/PRO/DX INJ NEW DRUG ADDON: CPT

## 2024-01-17 PROCEDURE — 80177 DRUG SCRN QUAN LEVETIRACETAM: CPT | Performed by: EMERGENCY MEDICINE

## 2024-01-17 PROCEDURE — 84145 PROCALCITONIN (PCT): CPT | Performed by: EMERGENCY MEDICINE

## 2024-01-17 PROCEDURE — 36415 COLL VENOUS BLD VENIPUNCTURE: CPT | Performed by: INTERNAL MEDICINE

## 2024-01-17 PROCEDURE — 87040 BLOOD CULTURE FOR BACTERIA: CPT | Performed by: EMERGENCY MEDICINE

## 2024-01-17 PROCEDURE — 93005 ELECTROCARDIOGRAM TRACING: CPT | Performed by: EMERGENCY MEDICINE

## 2024-01-17 PROCEDURE — 87493 C DIFF AMPLIFIED PROBE: CPT | Performed by: EMERGENCY MEDICINE

## 2024-01-17 PROCEDURE — 250N000011 HC RX IP 250 OP 636: Performed by: INTERNAL MEDICINE

## 2024-01-17 PROCEDURE — 99285 EMERGENCY DEPT VISIT HI MDM: CPT | Mod: 25

## 2024-01-17 PROCEDURE — 71045 X-RAY EXAM CHEST 1 VIEW: CPT

## 2024-01-17 PROCEDURE — 96366 THER/PROPH/DIAG IV INF ADDON: CPT

## 2024-01-17 PROCEDURE — 70450 CT HEAD/BRAIN W/O DYE: CPT

## 2024-01-17 PROCEDURE — 85025 COMPLETE CBC W/AUTO DIFF WBC: CPT | Performed by: EMERGENCY MEDICINE

## 2024-01-17 PROCEDURE — 250N000013 HC RX MED GY IP 250 OP 250 PS 637: Performed by: EMERGENCY MEDICINE

## 2024-01-17 PROCEDURE — 83690 ASSAY OF LIPASE: CPT | Performed by: EMERGENCY MEDICINE

## 2024-01-17 PROCEDURE — 82248 BILIRUBIN DIRECT: CPT | Performed by: EMERGENCY MEDICINE

## 2024-01-17 PROCEDURE — 84145 PROCALCITONIN (PCT): CPT | Performed by: INTERNAL MEDICINE

## 2024-01-17 PROCEDURE — 250N000011 HC RX IP 250 OP 636: Mod: JZ | Performed by: EMERGENCY MEDICINE

## 2024-01-17 PROCEDURE — 84295 ASSAY OF SERUM SODIUM: CPT | Performed by: INTERNAL MEDICINE

## 2024-01-17 PROCEDURE — 87507 IADNA-DNA/RNA PROBE TQ 12-25: CPT | Performed by: EMERGENCY MEDICINE

## 2024-01-17 PROCEDURE — 36415 COLL VENOUS BLD VENIPUNCTURE: CPT | Performed by: EMERGENCY MEDICINE

## 2024-01-17 PROCEDURE — 96367 TX/PROPH/DG ADDL SEQ IV INF: CPT

## 2024-01-17 PROCEDURE — 82607 VITAMIN B-12: CPT | Performed by: INTERNAL MEDICINE

## 2024-01-17 PROCEDURE — 210N000001 HC R&B IMCU HEART CARE

## 2024-01-17 PROCEDURE — 83735 ASSAY OF MAGNESIUM: CPT | Performed by: EMERGENCY MEDICINE

## 2024-01-17 PROCEDURE — 258N000003 HC RX IP 258 OP 636: Performed by: INTERNAL MEDICINE

## 2024-01-17 PROCEDURE — 250N000013 HC RX MED GY IP 250 OP 250 PS 637: Performed by: INTERNAL MEDICINE

## 2024-01-17 PROCEDURE — 99223 1ST HOSP IP/OBS HIGH 75: CPT | Performed by: INTERNAL MEDICINE

## 2024-01-17 PROCEDURE — 87637 SARSCOV2&INF A&B&RSV AMP PRB: CPT | Performed by: EMERGENCY MEDICINE

## 2024-01-17 PROCEDURE — 83605 ASSAY OF LACTIC ACID: CPT | Performed by: EMERGENCY MEDICINE

## 2024-01-17 PROCEDURE — 258N000003 HC RX IP 258 OP 636: Performed by: EMERGENCY MEDICINE

## 2024-01-17 PROCEDURE — 96365 THER/PROPH/DIAG IV INF INIT: CPT

## 2024-01-17 PROCEDURE — 84100 ASSAY OF PHOSPHORUS: CPT | Performed by: INTERNAL MEDICINE

## 2024-01-17 RX ORDER — MECLIZINE HCL 12.5 MG 12.5 MG/1
12.5 TABLET ORAL 2 TIMES DAILY
Status: DISCONTINUED | OUTPATIENT
Start: 2024-01-17 | End: 2024-01-24 | Stop reason: HOSPADM

## 2024-01-17 RX ORDER — CEFAZOLIN SODIUM 1 G/50ML
1250 SOLUTION INTRAVENOUS ONCE
Status: COMPLETED | OUTPATIENT
Start: 2024-01-17 | End: 2024-01-17

## 2024-01-17 RX ORDER — GABAPENTIN 300 MG/1
300 CAPSULE ORAL AT BEDTIME
Status: DISCONTINUED | OUTPATIENT
Start: 2024-01-17 | End: 2024-01-24 | Stop reason: HOSPADM

## 2024-01-17 RX ORDER — CEFEPIME HYDROCHLORIDE 1 G/1
1 INJECTION, POWDER, FOR SOLUTION INTRAMUSCULAR; INTRAVENOUS EVERY 12 HOURS
Status: COMPLETED | OUTPATIENT
Start: 2024-01-17 | End: 2024-01-20

## 2024-01-17 RX ORDER — ACETAMINOPHEN AND CODEINE PHOSPHATE 300; 30 MG/1; MG/1
2 TABLET ORAL ONCE
Status: COMPLETED | OUTPATIENT
Start: 2024-01-17 | End: 2024-01-17

## 2024-01-17 RX ORDER — GABAPENTIN 100 MG/1
100 CAPSULE ORAL DAILY
COMMUNITY

## 2024-01-17 RX ORDER — PANTOPRAZOLE SODIUM 40 MG/1
40 TABLET, DELAYED RELEASE ORAL
Status: DISCONTINUED | OUTPATIENT
Start: 2024-01-17 | End: 2024-01-24 | Stop reason: HOSPADM

## 2024-01-17 RX ORDER — POLYVINYL ALCOHOL 14 MG/ML
1 SOLUTION/ DROPS OPHTHALMIC
Status: DISCONTINUED | OUTPATIENT
Start: 2024-01-17 | End: 2024-01-24 | Stop reason: HOSPADM

## 2024-01-17 RX ORDER — VANCOMYCIN HYDROCHLORIDE 500 MG/10ML
500 INJECTION, POWDER, LYOPHILIZED, FOR SOLUTION INTRAVENOUS EVERY 12 HOURS
Status: DISCONTINUED | OUTPATIENT
Start: 2024-01-18 | End: 2024-01-18

## 2024-01-17 RX ORDER — CEFEPIME HYDROCHLORIDE 2 G/1
2 INJECTION, POWDER, FOR SOLUTION INTRAVENOUS ONCE
Status: COMPLETED | OUTPATIENT
Start: 2024-01-17 | End: 2024-01-17

## 2024-01-17 RX ORDER — GABAPENTIN 100 MG/1
100 CAPSULE ORAL DAILY
Status: DISCONTINUED | OUTPATIENT
Start: 2024-01-18 | End: 2024-01-24 | Stop reason: HOSPADM

## 2024-01-17 RX ORDER — NORTRIPTYLINE HYDROCHLORIDE 50 MG/1
50 CAPSULE ORAL AT BEDTIME
Status: DISCONTINUED | OUTPATIENT
Start: 2024-01-17 | End: 2024-01-24 | Stop reason: HOSPADM

## 2024-01-17 RX ORDER — QUETIAPINE FUMARATE 25 MG/1
50 TABLET, FILM COATED ORAL AT BEDTIME
Status: DISCONTINUED | OUTPATIENT
Start: 2024-01-17 | End: 2024-01-24 | Stop reason: HOSPADM

## 2024-01-17 RX ORDER — OXCARBAZEPINE 150 MG/1
450 TABLET, FILM COATED ORAL DAILY
Status: DISCONTINUED | OUTPATIENT
Start: 2024-01-17 | End: 2024-01-24 | Stop reason: HOSPADM

## 2024-01-17 RX ORDER — MIRTAZAPINE 15 MG/1
15 TABLET, FILM COATED ORAL AT BEDTIME
Status: DISCONTINUED | OUTPATIENT
Start: 2024-01-17 | End: 2024-01-24 | Stop reason: HOSPADM

## 2024-01-17 RX ORDER — POTASSIUM CHLORIDE 7.45 MG/ML
10 INJECTION INTRAVENOUS ONCE
Status: COMPLETED | OUTPATIENT
Start: 2024-01-17 | End: 2024-01-17

## 2024-01-17 RX ORDER — GABAPENTIN 100 MG/1
300 CAPSULE ORAL AT BEDTIME
COMMUNITY
End: 2024-09-13

## 2024-01-17 RX ORDER — SODIUM CHLORIDE 9 MG/ML
INJECTION, SOLUTION INTRAVENOUS CONTINUOUS
Status: DISCONTINUED | OUTPATIENT
Start: 2024-01-17 | End: 2024-01-20

## 2024-01-17 RX ORDER — CALCIUM CARBONATE 500 MG/1
1000 TABLET, CHEWABLE ORAL 4 TIMES DAILY PRN
Status: DISCONTINUED | OUTPATIENT
Start: 2024-01-17 | End: 2024-01-24 | Stop reason: HOSPADM

## 2024-01-17 RX ORDER — MECLIZINE HCL 12.5 MG 12.5 MG/1
12.5 TABLET ORAL 2 TIMES DAILY
COMMUNITY
End: 2024-04-15

## 2024-01-17 RX ORDER — LIDOCAINE 40 MG/G
CREAM TOPICAL
Status: DISCONTINUED | OUTPATIENT
Start: 2024-01-17 | End: 2024-01-24 | Stop reason: HOSPADM

## 2024-01-17 RX ORDER — ACETAMINOPHEN AND CODEINE PHOSPHATE 300; 30 MG/1; MG/1
1 TABLET ORAL EVERY 6 HOURS PRN
Status: DISCONTINUED | OUTPATIENT
Start: 2024-01-17 | End: 2024-01-24 | Stop reason: HOSPADM

## 2024-01-17 RX ORDER — POTASSIUM CHLORIDE 1.5 G/1.58G
20 POWDER, FOR SOLUTION ORAL ONCE
Status: COMPLETED | OUTPATIENT
Start: 2024-01-17 | End: 2024-01-17

## 2024-01-17 RX ORDER — TOPIRAMATE 25 MG/1
50 TABLET, FILM COATED ORAL AT BEDTIME
Status: DISCONTINUED | OUTPATIENT
Start: 2024-01-17 | End: 2024-01-24 | Stop reason: HOSPADM

## 2024-01-17 RX ADMIN — SODIUM CHLORIDE: 9 INJECTION, SOLUTION INTRAVENOUS at 16:54

## 2024-01-17 RX ADMIN — ACETAMINOPHEN AND CODEINE PHOSPHATE 1 TABLET: 300; 30 TABLET ORAL at 20:19

## 2024-01-17 RX ADMIN — OXCARBAZEPINE 450 MG: 150 TABLET, FILM COATED ORAL at 18:10

## 2024-01-17 RX ADMIN — POTASSIUM CHLORIDE 10 MEQ: 7.46 INJECTION, SOLUTION INTRAVENOUS at 15:25

## 2024-01-17 RX ADMIN — GABAPENTIN 300 MG: 300 CAPSULE ORAL at 22:53

## 2024-01-17 RX ADMIN — SODIUM CHLORIDE 1000 ML: 9 INJECTION, SOLUTION INTRAVENOUS at 11:28

## 2024-01-17 RX ADMIN — NORTRIPTYLINE HYDROCHLORIDE 50 MG: 50 CAPSULE ORAL at 22:54

## 2024-01-17 RX ADMIN — QUETIAPINE FUMARATE 50 MG: 25 TABLET ORAL at 22:53

## 2024-01-17 RX ADMIN — POTASSIUM CHLORIDE 10 MEQ: 7.46 INJECTION, SOLUTION INTRAVENOUS at 18:10

## 2024-01-17 RX ADMIN — MECLIZINE 12.5 MG: 12.5 TABLET ORAL at 20:10

## 2024-01-17 RX ADMIN — ACETAMINOPHEN AND CODEINE PHOSPHATE 2 TABLET: 300; 30 TABLET ORAL at 14:16

## 2024-01-17 RX ADMIN — VANCOMYCIN HYDROCHLORIDE 1250 MG: 5 INJECTION, POWDER, LYOPHILIZED, FOR SOLUTION INTRAVENOUS at 12:24

## 2024-01-17 RX ADMIN — PANTOPRAZOLE SODIUM 40 MG: 40 TABLET, DELAYED RELEASE ORAL at 18:10

## 2024-01-17 RX ADMIN — LEVETIRACETAM 750 MG: 500 TABLET, FILM COATED ORAL at 20:11

## 2024-01-17 RX ADMIN — TOPIRAMATE 50 MG: 25 TABLET, FILM COATED ORAL at 22:53

## 2024-01-17 RX ADMIN — MIRTAZAPINE 15 MG: 15 TABLET, FILM COATED ORAL at 22:54

## 2024-01-17 RX ADMIN — CEFEPIME HYDROCHLORIDE 1 G: 1 INJECTION, POWDER, FOR SOLUTION INTRAMUSCULAR; INTRAVENOUS at 22:59

## 2024-01-17 RX ADMIN — CEFEPIME 2 G: 2 INJECTION, POWDER, FOR SOLUTION INTRAVENOUS at 11:35

## 2024-01-17 RX ADMIN — SODIUM CHLORIDE 500 ML: 9 INJECTION, SOLUTION INTRAVENOUS at 13:04

## 2024-01-17 ASSESSMENT — ACTIVITIES OF DAILY LIVING (ADL)
ADLS_ACUITY_SCORE: 35
CHANGE_IN_FUNCTIONAL_STATUS_SINCE_ONSET_OF_CURRENT_ILLNESS/INJURY: YES
TOILETING_MANAGEMENT: W/C BOUND
DIFFICULTY_COMMUNICATING: NO
HEARING_DIFFICULTY_OR_DEAF: NO
ADLS_ACUITY_SCORE: 47
FALL_HISTORY_WITHIN_LAST_SIX_MONTHS: YES
EQUIPMENT_CURRENTLY_USED_AT_HOME: WALKER, ROLLING;WHEELCHAIR, MANUAL
DRESSING/BATHING: BATHING DIFFICULTY, ASSISTANCE 1 PERSON
TOILETING: 1-->ASSISTANCE (EQUIPMENT/PERSON) NEEDED (NOT DEVELOPMENTALLY APPROPRIATE)
VISION_MANAGEMENT: GLASSES
WEAR_GLASSES_OR_BLIND: YES
ADLS_ACUITY_SCORE: 35
DEPENDENT_IADLS:: CLEANING;TRANSPORTATION;COOKING;LAUNDRY;SHOPPING;MEAL PREPARATION;MEDICATION MANAGEMENT
TOILETING: 1-->ASSISTANCE (EQUIPMENT/PERSON) NEEDED
ADLS_ACUITY_SCORE: 35
TOILETING_ISSUES: YES
ADLS_ACUITY_SCORE: 47
ADLS_ACUITY_SCORE: 47
DOING_ERRANDS_INDEPENDENTLY_DIFFICULTY: YES
CONCENTRATING,_REMEMBERING_OR_MAKING_DECISIONS_DIFFICULTY: YES
WALKING_OR_CLIMBING_STAIRS_DIFFICULTY: YES
DRESSING/BATHING_DIFFICULTY: YES
ADLS_ACUITY_SCORE: 47
TOILETING_ASSISTANCE: TOILETING DIFFICULTY, ASSISTANCE 1 PERSON
WALKING_OR_CLIMBING_STAIRS: AMBULATION DIFFICULTY, ASSISTANCE 1 PERSON;AMBULATION DIFFICULTY, REQUIRES EQUIPMENT
DIFFICULTY_EATING/SWALLOWING: NO
NUMBER_OF_TIMES_PATIENT_HAS_FALLEN_WITHIN_LAST_SIX_MONTHS: 1

## 2024-01-17 NOTE — ED PROVIDER NOTES
"EMERGENCY DEPARTMENT ENCOUNTER      NAME: Julisa Chaney  AGE: 78 year old female  YOB: 1945  MRN: [id number]  EVALUATION DATE & TIME: 1/17/2024 10:40 AM    PCP: Mara Murillo    ED PROVIDER: Shelia Sheffield M.D.      Chief Complaint   Patient presents with    Generalized Weakness    Fatigue    Dehydration         FINAL IMPRESSION:  1. Hypokalemia    2. Adult failure to thrive          ED COURSE & MEDICAL DECISION MAKING:    ED Course as of 01/17/24 1426   Wed Jan 17, 2024   1100 Patient here without acute neuro abnormalities focally and with ability to provide HPI with 2 days lower appetite and fatigue, no abdominal pain, no change to chronic diarrhea, no falls, no cough or SOB, with BP 85/58 and feeling \"dehydration\", lives alone and now unable to get up and walk around comfortably. Pt with recurrent PNA and UTI, no recent antibiotic therapy, pending UA, CXR, IVF with low MAP 30cc/kg pending sepsis bolus, cultures/CBC / lactic acid pending with chemistry. Patient and daughter ok with plan ultimately to admit with inability to ambulate, take her medications and perform ADLs, she does have h/o home PT and nursing use but per daughter has only one remaining visit on schedule and with new acute worsening of condition, inability to care for self, low MAP with dehydration and likely superimposed infection with antibiotic therapy empirically begun, they are ok with plan to seek bed availability through SOC with boarding crisis currently underway and patient seen in hallway bed on cardiac monitor. Patient ok with plan to straight cath for UA, and keppra level add on pending with h/o epilepsy. With h/o remote pyogenic brain abscess but no breakthrough seizures, new fever, no headache or AMS, unlikely recurrent pathology atypical to begin with but given likely sepsis and age 78, low risk to CT at this time, CT head pending to ensure no changes along with abdomen with chronic diarrhea 3 months with upcoming " reexamination by [facility] pending. Stool studies also pending in case diarrhea here including culture/ova/parasites and C diff.    1114 I spoke with brian Keisha re: straight cath   1224 UA without cells to suggest UTI. K 2.8 orally and IV repleted, CBC WNL and LFTs WNL with negative procalcitonin, normal magnesium and lactic acid, pending imaging and viral PCR   1333 CT abdomen with liquid stool without pancolitis clearly present or other acute pathology, fitting wiht her reported history of chronic diarrhea   1334 CXR reassuringly negative for acute pathology   1334 CT head reassuringly without acute pathology and after IVF VS improving. Patient is somewhat more alert, given frail 78 year old who lives alone unable to care for self and with adult FTT, SOC order placed for admission/seeking bed and for hydration, PT/OT and ultimately placement   1347 Patient now declines transfer to [facility] after charge RN Vonda spoke with SOC and possible [facility] bed available per SOC, offered to patient, she then declined transfer to [facility] or elsewhere and notes she feels she actually only wants to be admitted here. She then declined oral potassium, given further IV K for her hypokalemia, and asked for tylenol #3 for headache which she takes daily, hospitalist here paged for admission.   1425 Pt endorsed to hospitalist Dr Paniagua to med surg tele       Pertinent Labs & Imaging studies reviewed. (See chart for details)    N95 worn  A face shield was worn also  COVID PPE    Medical Decision Making  Obtained supplemental history:Supplemental history obtained?: Family Member/Significant Other  Reviewed external records: External records reviewed?: No  Care impacted by chronic illness:Chronic Pain, Hyperlipidemia, and Other: epilepsy, chronic diarrhea, adult failure to thrive  Care significantly affected by social determinants of health:N/A  Did you consider but not order tests?: Work up considered but not performed and  documented in chart, if applicable  Did you interpret images independently?: Independent interpretation of ECG and images noted in documentation, when applicable.  Consultation discussion with other provider:Did you involve another provider (consultant, , pharmacy, etc.)?: I discussed the care with another health care provider, see documentation for details.  Admit.    Critical Care time was 35 minutes for this patient excluding procedures.      At the conclusion of the encounter I discussed the results of all of the tests and the disposition. The questions were answered. The patient or family acknowledged understanding and was agreeable with the care plan.     MEDICATIONS GIVEN IN THE EMERGENCY:  Medications   sodium chloride 0.9 % infusion (500 mLs Intravenous $New Bag 1/17/24 1304)   potassium chloride 10 mEq in 100 mL sterile water infusion (has no administration in time range)   potassium chloride 10 mEq in 100 mL sterile water infusion (has no administration in time range)   sodium chloride 0.9% BOLUS 1,497 mL (0 mLs Intravenous Stopped 1/17/24 1300)   ceFEPIme (MAXIPIME) 2 g vial to attach to  mL bag for ADULTS or 50 mL bag for PEDS (0 g Intravenous Stopped 1/17/24 1208)   vancomycin (VANCOCIN) 1,250 mg in sodium chloride 0.9 % 250 mL intermittent infusion (0 mg Intravenous Stopped 1/17/24 1409)   potassium chloride (KLOR-CON) Packet 20 mEq (20 mEq Oral Not Given 1/17/24 1339)   acetaminophen-codeine (TYLENOL #3) 300-30 MG per tablet 2 tablet (2 tablets Oral $Given 1/17/24 1416)       NEW PRESCRIPTIONS STARTED AT TODAY'S ER VISIT  New Prescriptions    No medications on file          =================================================================    HPI      Julisa Chaney is a 78 year old female with PMHx of chronic pain disorder with chronic opioid dependence, HLD, epilepsy with remote pyogenic brain abscess, chronic diarrhea following with GI, adult failure to thrive who presents to the ED today  "with daughter who is her primary caretaker with generalized weakness and fatigue.     The patient's daughter notes that the patient has been very tired this morning and has not been able to stay awake. The patient lives at home alone and her daughter comes over to take care of her. When the patient's daughter got to her house this morning, the patient hadn't taken home medications independently as normal so her daughter gave her her home medications before coming to the ED. The patient ate dinner as usual last night. This morning, she did not eat much breakfast. The patient notes that she currently feels \"woozy\" and light headed. The patient has had diarrhea since October and has not had any recent changes to her bowel movements. She notes that she has chronic pain. The patient does home therapy and has been doing well in therapy. She has not had any recent falls. She denies any recent fever, cough, abdominal pain, hematuria, dysuria, or vomiting. The patient had an appointment on Friday (~5 days ago) with her PCP and everything was normal.     REVIEW OF SYSTEMS   All other systems reviewed and are negative except as noted above in HPI.    PAST MEDICAL HISTORY:  Past Medical History:   Diagnosis Date    Aspiration pneumonia (H) 02/2022    Depression     High cholesterol     Migraines     Pyloric ulcer, chronic     Sepsis (H) 08/10/2020    Trigeminal neuralgia        PAST SURGICAL HISTORY:  Past Surgical History:   Procedure Laterality Date    HYSTERECTOMY      IR GASTROSTOMY TUBE PERCUTANEOUS PLCMNT  8/16/2022    PICC TRIPLE LUMEN PLACEMENT  7/22/2022         [place] ESOPHAGOGASTRODUODENOSCOPY TRANSORAL DIAGNOSTIC N/A 8/13/2020    Procedure: ESOPHAGOGASTRODUODENOSCOPY (EGD);  Surgeon: Nathan Smalls MD;  Location: [facility];  Service: Gastroenterology    [place] ESOPHAGOGASTRODUODENOSCOPY TRANSORAL DIAGNOSTIC N/A 8/14/2020    Procedure: ESOPHAGOGASTRODUODENOSCOPY (EGD), PYLORIC DILATION;  Surgeon: Nathan Smalls, " MD;  [place] [facility] [place]  Service: Gastroenterology    VENTRICULOSTOMY Left 2022    Procedure: LEFT FRONTAL VENTRICULOSTOMY;  Surgeon: Brady Heredia MD;  Location: [facility] COLONOSCOPY W/WO BRUSH/WASH N/A 2020    Procedure: COLONOSCOPY WITH POLYPECTOMY;  Surgeon: Nathan Smalls MD;  [place] [facility] [place];  Service: Gastroenterology       CURRENT MEDICATIONS:    acetaminophen-codeine (TYLENOL #3) 300-30 MG per tablet  ferrous sulfate (FEROSUL) 325 (65 Fe) MG tablet  gabapentin (NEURONTIN) 100 MG capsule  gabapentin (NEURONTIN) 100 MG capsule  levETIRAcetam (KEPPRA) 750 MG tablet  loperamide (IMODIUM A-D) 2 MG tablet  meclizine (ANTIVERT) 12.5 MG tablet  mirtazapine (REMERON) 15 MG tablet  Multiple Vitamin (MULTIVITAMIN) TABS  nortriptyline (PAMELOR) 50 MG capsule  omeprazole (PRILOSEC) 40 MG DR capsule  OXcarbazepine (TRILEPTAL) 150 MG tablet  polyvinyl alcohol (ARTIFICIAL TEARS) 1.4 % ophthalmic solution  psyllium (METAMUCIL) 58.6 % packet  QUEtiapine (SEROQUEL) 50 MG tablet  topiramate (TOPAMAX) 50 MG tablet  Vitamin D3 50 mcg (2000 units) tablet  Wound Dressings (TRIAD HYDROPHILIC WOUND DRESSI) PSTE        ALLERGIES:  Allergies   Allergen Reactions    Contrast [Iohexol] Rash     Noticed during 2020 admission. Received IV contrast on     Chocolate Headache    Contrast Dye Rash    Penicillins Rash       FAMILY HISTORY:  Family History   Problem Relation Age of Onset    Cancer Father     Testicular cancer Grandchild 17.00    Breast Cancer Mother     Breast Cancer Sister     Breast Cancer Maternal Aunt     Breast Cancer Sister        SOCIAL HISTORY:   Social History     Socioeconomic History    Marital status:    Tobacco Use    Smoking status: Former     Packs/day: 1.00     Years: 50.00     Additional pack years: 0.00     Total pack years: 50.00     Types: Cigarettes     Quit date: 2014     Years since quittin.1    Smokeless tobacco: Never   Substance and  Sexual Activity    Alcohol use: No    Drug use: No   Social History Narrative    Lives alone in the house, relay in TV dinner  grandkids help with house maintenance  Daughter lives close by.  Mara Murillo MD 7/9/2018 1:49 PM       Social Determinants of Health     Financial Resource Strain: Low Risk  (1/11/2024)    Financial Resource Strain     Within the past 12 months, have you or your family members you live with been unable to get utilities (heat, electricity) when it was really needed?: No   Food Insecurity: Low Risk  (1/11/2024)    Food Insecurity     Within the past 12 months, did you worry that your food would run out before you got money to buy more?: No     Within the past 12 months, did the food you bought just not last and you didn t have money to get more?: No   Transportation Needs: Low Risk  (1/11/2024)    Transportation Needs     Within the past 12 months, has lack of transportation kept you from medical appointments, getting your medicines, non-medical meetings or appointments, work, or from getting things that you need?: No   Housing Stability: Low Risk  (1/11/2024)    Housing Stability     Do you have housing? : Yes     Are you worried about losing your housing?: No       VITALS:  Patient Vitals for the past 24 hrs:   BP Temp Temp src Pulse Resp SpO2 Weight   01/17/24 1355 115/56 -- -- 69 (!) 34 96 % --   01/17/24 1340 113/57 -- -- 71 (!) 35 97 % --   01/17/24 1330 115/56 -- -- 72 16 97 % --   01/17/24 1314 107/53 -- -- 72 23 98 % --   01/17/24 1259 112/53 -- -- 75 24 94 % --   01/17/24 1233 -- -- -- 76 20 98 % --   01/17/24 1218 -- -- -- 78 30 97 % --   01/17/24 1048 106/57 -- -- -- -- -- --   01/17/24 1038 -- -- -- -- 14 -- --   01/17/24 1033 (!) 85/58 97.9  F (36.6  C) Oral 86 -- 93 % 49.9 kg (110 lb)       PHYSICAL EXAM    GENERAL: Awake, alert.  In no acute distress.   HEENT: Normocephalic, atraumatic.  Pupils equal, round and reactive.  Conjunctiva normal.  EOMI.  NECK: No stridor or  apparent deformity.  PULMONARY: Symmetrical breath sounds without distress.  Lungs clear to auscultation bilaterally without wheezes, rhonchi or rales.  CARDIO: Regular rate and rhythm.  No significant murmur, rub or gallop.  Radial pulses strong and symmetrical.  ABDOMINAL: Abdomen soft, non-distended and non-tender to palpation.  No CVAT, no palpable hepatosplenomegaly.  EXTREMITIES: No lower extremity swelling or edema.    NEURO: Alert and oriented to person, place and time.  Cranial nerves grossly intact.  No focal motor deficit.  PSYCH: Normal mood and affect  SKIN: No rashes      LAB:  All pertinent labs reviewed and interpreted.  Results for orders placed or performed during the hospital encounter of 01/17/24   CT Head w/o Contrast    Impression    IMPRESSION:  1.  Stable compared to previous head CT 10/22/2023.  2.  No intracranial hemorrhage, mass lesions, hydrocephalus or CT evidence for an acute infarct.  3.  Moderate-sized area of encephalomalacia involving the right cerebellar hemisphere. Small encephalomalacia in the left cerebellar hemisphere.   4.  Mild diffuse cerebral parenchymal volume loss. Presumed chronic hypertensive/microvascular ischemic white matter changes.   CT Abdomen Pelvis w/o Contrast    Impression    IMPRESSION:   1.  Liquid stool throughout the colon, nonspecific but can be seen in setting of enteritis/colitis.  2.  Additional details in the findings.      XR Chest 1 View    Impression    IMPRESSION: Low lung volumes with right basilar atelectasis or scarring. Remainder of the lungs are clear. Cardiomediastinal silhouette within normal limits. No large pleural effusion. No significant interval change.   Basic metabolic panel   Result Value Ref Range    Sodium 146 (H) 135 - 145 mmol/L    Potassium 2.8 (L) 3.4 - 5.3 mmol/L    Chloride 106 98 - 107 mmol/L    Carbon Dioxide (CO2) 29 22 - 29 mmol/L    Anion Gap 11 7 - 15 mmol/L    Urea Nitrogen 11.9 8.0 - 23.0 mg/dL    Creatinine 0.76  0.51 - 0.95 mg/dL    GFR Estimate 80 >60 mL/min/1.73m2    Calcium 9.2 8.8 - 10.2 mg/dL    Glucose 94 70 - 99 mg/dL   Hepatic function panel   Result Value Ref Range    Protein Total 7.0 6.4 - 8.3 g/dL    Albumin 3.3 (L) 3.5 - 5.2 g/dL    Bilirubin Total <0.2 <=1.2 mg/dL    Alkaline Phosphatase 140 40 - 150 U/L    AST 12 0 - 45 U/L    ALT 8 0 - 50 U/L    Bilirubin Direct <0.20 0.00 - 0.30 mg/dL   Result Value Ref Range    Lipase 7 (L) 13 - 60 U/L   Lactic acid whole blood   Result Value Ref Range    Lactic Acid 0.8 0.7 - 2.0 mmol/L   Result Value Ref Range    Procalcitonin 0.10 <0.50 ng/mL   Result Value Ref Range    Magnesium 1.9 1.7 - 2.3 mg/dL   UA with Microscopic reflex to Culture    Specimen: Urine, Catheter   Result Value Ref Range    Color Urine Yellow Colorless, Straw, Light Yellow, Yellow    Appearance Urine Clear Clear    Glucose Urine Negative Negative mg/dL    Bilirubin Urine Negative Negative    Ketones Urine 10 (A) Negative mg/dL    Specific Gravity Urine 1.032 (H) 1.001 - 1.030    Blood Urine Negative Negative    pH Urine 6.0 5.0 - 7.0    Protein Albumin Urine 30 (A) Negative mg/dL    Urobilinogen Urine <2.0 <2.0 mg/dL    Nitrite Urine Negative Negative    Leukocyte Esterase Urine Negative Negative    RBC Urine <1 <=2 /HPF    WBC Urine <1 <=5 /HPF   Symptomatic Influenza A/B, RSV, & SARS-CoV2 PCR (COVID-19) Nasopharyngeal    Specimen: Nasopharyngeal; Swab   Result Value Ref Range    Influenza A PCR Negative Negative    Influenza B PCR Negative Negative    RSV PCR Negative Negative    SARS CoV2 PCR Negative Negative   CBC with platelets and differential   Result Value Ref Range    WBC Count 5.2 4.0 - 11.0 10e3/uL    RBC Count 5.03 3.80 - 5.20 10e6/uL    Hemoglobin 12.7 11.7 - 15.7 g/dL    Hematocrit 43.9 35.0 - 47.0 %    MCV 87 78 - 100 fL    MCH 25.2 (L) 26.5 - 33.0 pg    MCHC 28.9 (L) 31.5 - 36.5 g/dL    RDW 15.1 (H) 10.0 - 15.0 %    Platelet Count 468 (H) 150 - 450 10e3/uL    % Neutrophils 51 %    %  Lymphocytes 34 %    % Monocytes 11 %    % Eosinophils 3 %    % Basophils 1 %    % Immature Granulocytes 0 %    NRBCs per 100 WBC 0 <1 /100    Absolute Neutrophils 2.7 1.6 - 8.3 10e3/uL    Absolute Lymphocytes 1.8 0.8 - 5.3 10e3/uL    Absolute Monocytes 0.6 0.0 - 1.3 10e3/uL    Absolute Eosinophils 0.1 0.0 - 0.7 10e3/uL    Absolute Basophils 0.1 0.0 - 0.2 10e3/uL    Absolute Immature Granulocytes 0.0 <=0.4 10e3/uL    Absolute NRBCs 0.0 10e3/uL   Extra Blue Top Tube   Result Value Ref Range    Hold Specimen JIC    Extra Red Top Tube   Result Value Ref Range    Hold Specimen JIC        RADIOLOGY:  Reviewed all pertinent imaging. Please see official radiology report.  CT Abdomen Pelvis w/o Contrast   Final Result   IMPRESSION:    1.  Liquid stool throughout the colon, nonspecific but can be seen in setting of enteritis/colitis.   2.  Additional details in the findings.          CT Head w/o Contrast   Final Result   IMPRESSION:   1.  Stable compared to previous head CT 10/22/2023.   2.  No intracranial hemorrhage, mass lesions, hydrocephalus or CT evidence for an acute infarct.   3.  Moderate-sized area of encephalomalacia involving the right cerebellar hemisphere. Small encephalomalacia in the left cerebellar hemisphere.    4.  Mild diffuse cerebral parenchymal volume loss. Presumed chronic hypertensive/microvascular ischemic white matter changes.      XR Chest 1 View   Final Result   IMPRESSION: Low lung volumes with right basilar atelectasis or scarring. Remainder of the lungs are clear. Cardiomediastinal silhouette within normal limits. No large pleural effusion. No significant interval change.            EKG:    Reviewed and interpreted as: Performed on 17-Jan-2024  Vent rate: 75 bpm.   Normal sinus rhythm. No ST abnormalities. Morphologically similar to prior EKG 10/22/23.     I have independently reviewed and interpreted the EKG(s) documented above.        I, Shanique Orr, am serving as a scribe to document  services personally performed by Dr. Shelia Sheffield based on my observation and the provider's statements to me. I, Shelia Sheffield MD attest that Shanique Orr is acting in a scribe capacity, has observed my performance of the services and has documented them in accordance with my direction.       Shelia Sheffield MD  01/17/24 2691

## 2024-01-17 NOTE — CONSULTS
"[facility] Digestive Health Consultation     Julisa Chaney  [address]  [place] [address]  78 year old female     Admission Date/Time: 1/17/2024  Primary Care Provider: Mara Murillo  Referring / Attending Physician:  Dr. 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 difficile (8/2022) and norovirus (10/2023) who presented to the ED today with diarrhea, fatigue, and weakness. We were asked to consult for diarrhea.     Today the patient reports that she has had ongoing diarrhea since October 2023 when she was last hospitalized.  She states that she has an average of 2 episodes of large volume diarrheal episodes during the day, and typically 3 episodes overnight.  She wears a pad, and often is unable to make it to the toilet in time.  She notes that frequently her pad is completely full if she is incontinent of stool.  She reports feeling like her lower abdomen is \"full\" even after she has a bowel movement. She denies any associated black or bloody stools.  She denies any abdominal pain, nausea, or vomiting.  She denies any fevers.  She denies any antibiotic use since her last hospitalization.    On chart review, the patient was admitted to the hospital on 10/16/2023 for diarrhea and fall.  At that time she had an enteric pathogen panel that was positive for norovirus, C. difficile testing was negative at that time.  Patient represented to the emergency department on 10/22/2023 for diarrhea and generalized weakness.  C. difficile testing was repeated at that time and was negative.  Per the discharge summary, the patient's symptoms improved during hospitalization, and she was discharged home on 10/24/2023.    The patient had a colonoscopy on 8/13/2020 for iron deficiency anemia.  This was significant for small hepatic flexure polyp that " was removed.  Prep was good to marginal, but most of the debris was able to be cleaned out.  Polyp biopsy was a tubular adenoma without evidence of dysplasia or malignancy, recall colonoscopy recommended for 5 years.  EGD 9/18/2024 abnormal CT imaging notable for diffuse gastritis and a duodenal stricture just beyond the pylorus that was erythematous and firm.  Biopsy obtained.  Unable to pass and second portion of duodenum.  Duodenal biopsy showed normal duodenal mucosa, negative for celiac disease.  Gastric biopsy showed reactive gastropathy, negative for H. pylori.    PAST MEDICAL HISTORY:  Patient Active Problem List    Diagnosis Date Noted    Adult failure to thrive 01/17/2024     Priority: Medium    Seizure disorder (H) 01/11/2024     Priority: Medium    Failure to thrive in adult 10/25/2023     Priority: Medium    Hypokalemia 10/22/2023     Priority: Medium    Diarrhea, unspecified type 10/22/2023     Priority: Medium    Falls frequently 07/06/2023     Priority: Medium    F11.2 - Continuous opioid dependence (H) 06/01/2023     Priority: Medium    Malnutrition, unspecified type (H24) 06/01/2023     Priority: Medium    Current mild episode of major depressive disorder without prior episode (H24) 06/01/2023     Priority: Medium    Pyogenic brain abscess 07/27/2022     Priority: Medium    Mild major depression (H) 05/04/2021     Priority: Medium    Pyloric ulcer, chronic 05/04/2021     Priority: Medium     Loosing wt, as appetite is not much       Formatting of this note might be different from the original.  Loosing wt, as appetite is not much      Abnormal chest CT 08/16/2020     Priority: Medium     CT 8/8/20-Tree-in-bud opacities involving the right upper and right middle   lobes of the lung are suspicious for an infectious/inflammatory process.   There is a more nodular appearing opacity in the right upper lobe on axial   image 138 of series 4 for which a   follow-up CT examination in three months is  recommended.            Chronic pain syndrome      Priority: Medium    Iron deficiency anemia due to chronic blood loss 2020     Priority: Medium     Added automatically from request for surgery [id number]      Formatting of this note might be different from the original.  Added automatically from request for surgery [id number]      Hypercalcemia 2019     Priority: Medium    Controlled substance agreement signed 2018     Priority: Medium    Osteopenia      Priority: Medium     Created by Conversion  Replacement Utility updated for latest IMO load      Formatting of this note might be different from the original.  Created by Conversion    Replacement Utility updated for latest IMO load      Migraine headache      Priority: Medium     Had MRI of head done 2022, for new left side weakness  Following neurologist   IMPRESSION:  1.  No evidence of acute large territorial infarction, acute intracranial hemorrhage, or mass lesion.  2.  Stable chronic bilateral cerebellar infarcts.  3.  Stable mild chronic small vessel ischemic change and mild diffuse brain parenchymal volume loss.      Counseling regarding advanced directives and goals of care 10/14/2014     Priority: Medium    Trigeminal neuralgia      Priority: Medium     Currently following neurologist table on trileptal , nortriptyline,kepra, topamax, and does take tylenol#3 daily      Hyperlipidemia      Priority: Medium     Created by Conversion      Formatting of this note might be different from the original.  Created by Conversion       SOCIAL HISTORY:  Social History     Tobacco Use    Smoking status: Former     Packs/day: 1.00     Years: 50.00     Additional pack years: 0.00     Total pack years: 50.00     Types: Cigarettes     Quit date: 2014     Years since quittin.1    Smokeless tobacco: Never   Substance Use Topics    Alcohol use: No    Drug use: No     FAMILY HISTORY:  Family History   Problem Relation Age of Onset    Cancer  Father     Testicular cancer Grandchild 17.00    Breast Cancer Mother     Breast Cancer Sister     Breast Cancer Maternal Aunt     Breast Cancer Sister      ALLERGIES:   Allergies   Allergen Reactions    Contrast [Iohexol] Rash     Noticed during 08/2020 admission. Received IV contrast on 8/5    Chocolate Headache    Contrast Dye Rash    Penicillins Rash     MEDICATIONS:   Current Facility-Administered Medications   Medication    acetaminophen-codeine (TYLENOL #3) 300-30 MG per tablet 1 tablet    calcium carbonate (TUMS) chewable tablet 1,000 mg    ceFEPIme (MAXIPIME) 1 g vial to attach to  mL bag for ADULTS or NS 50 mL bag for PEDS    [START ON 1/18/2024] gabapentin (NEURONTIN) capsule 100 mg    gabapentin (NEURONTIN) capsule 300 mg    levETIRAcetam (KEPPRA) tablet 750 mg    lidocaine (LMX4) cream    lidocaine 1 % 0.1-1 mL    meclizine (ANTIVERT) tablet 12.5 mg    mirtazapine (REMERON) tablet 15 mg    nortriptyline (PAMELOR) capsule 50 mg    [START ON 1/18/2024] omeprazole (PriLOSEC) CR capsule 40 mg    OXcarbazepine (TRILEPTAL) tablet 450 mg    polyvinyl alcohol (LIQUIFILM TEARS) 1.4 % ophthalmic solution 1 drop    potassium chloride 10 mEq in 100 mL sterile water infusion    potassium chloride 10 mEq in 100 mL sterile water infusion    QUEtiapine (SEROquel) half-tab 50 mg    sodium chloride (PF) 0.9% PF flush 3 mL    sodium chloride (PF) 0.9% PF flush 3 mL    sodium chloride 0.9 % infusion    topiramate (TOPAMAX) tablet 50 mg     Current Outpatient Medications   Medication    acetaminophen-codeine (TYLENOL #3) 300-30 MG per tablet    ferrous sulfate (FEROSUL) 325 (65 Fe) MG tablet    gabapentin (NEURONTIN) 100 MG capsule    gabapentin (NEURONTIN) 100 MG capsule    levETIRAcetam (KEPPRA) 750 MG tablet    loperamide (IMODIUM A-D) 2 MG tablet    meclizine (ANTIVERT) 12.5 MG tablet    mirtazapine (REMERON) 15 MG tablet    Multiple Vitamin (MULTIVITAMIN) TABS    nortriptyline (PAMELOR) 50 MG capsule     omeprazole (PRILOSEC) 40 MG DR capsule    OXcarbazepine (TRILEPTAL) 150 MG tablet    polyvinyl alcohol (ARTIFICIAL TEARS) 1.4 % ophthalmic solution    psyllium (METAMUCIL) 58.6 % packet    QUEtiapine (SEROQUEL) 50 MG tablet    topiramate (TOPAMAX) 50 MG tablet    Vitamin D3 50 mcg (2000 units) tablet    Wound Dressings (TRIAD HYDROPHILIC WOUND DRESSI) PSTE     PHYSICAL EXAM:   /56   Pulse 69   Temp 97.9  F (36.6  C) (Oral)   Resp (!) 34   Wt 49.9 kg (110 lb)   SpO2 96%   BMI 17.75 kg/m     GEN: No acute distress, thin, resting comfortably in bed  HEENT: No icterus  HRT: appears well perfused  LUNGS: No respiratory distress  ABD: soft, no abdominal tenderness  SKIN: Visualized skin intact without rash  MSKL: no LE edema  NEURO: Alert and grossly oriented     ADDITIONAL DATA:   I reviewed the patient's new clinical lab test results.   Recent Labs   Lab Test 01/17/24  1055 10/22/23  1105 10/16/23  1437 07/30/22  0559 07/29/22  1414 07/28/22  0408 07/27/22  2231 07/20/22  0923 07/19/22  2255   WBC 5.2 7.6 6.4   < >  --    < > 6.8   < >  --    HGB 12.7 15.6 13.2   < >  --    < > 9.6*   < >  --    MCV 87 87 87   < >  --    < > 73*   < >  --    * 426 410   < >  --    < > 547*   < >  --    INR  --   --   --   --  1.34*  --  1.13  --  1.36*    < > = values in this interval not displayed.     Recent Labs   Lab Test 01/17/24  1055 10/30/23  0652 10/26/23  0742 10/25/23  1928   POTASSIUM 2.8* 3.7 3.8 2.4*   CHLORIDE 106  --  105 102   CO2 29  --  29 30*   BUN 11.9  --  7.5* 8.7   ANIONGAP 11  --  11 10     Recent Labs   Lab Test 01/17/24  1155 01/17/24  1055 10/22/23  1105 08/24/23  1906 08/24/23  1721 07/02/23  0629 07/02/23  0407 07/19/22 2017 07/19/22 1901   ALBUMIN  --  3.3* 3.6  --  3.6   < >  --    < > 3.6   BILITOTAL  --  <0.2 0.2  --  <0.2   < >  --    < > 0.5   ALT  --  8 15  --  12   < >  --    < > 31   AST  --  12 16  --  20   < >  --    < > 25   PROTEIN 30*  --   --  Negative  --   --  10*   < >   --    LIPASE  --  7* 6*  --   --   --   --   --  <9    < > = values in this interval not displayed.     Imaging results:  CT abdomen/pelvis 1/17/2024  IMPRESSION:   1.  Liquid stool throughout the colon, nonspecific but can be seen in setting of enteritis/colitis.  2.  Additional details in the findings.     CT head w/o contrast 1/17/2024  IMPRESSION:  1.  Stable compared to previous head CT 10/22/2023.  2.  No intracranial hemorrhage, mass lesions, hydrocephalus or CT evidence for an acute infarct.  3.  Moderate-sized area of encephalomalacia involving the right cerebellar hemisphere. Small encephalomalacia in the left cerebellar hemisphere.   4.  Mild diffuse cerebral parenchymal volume loss. Presumed chronic hypertensive/microvascular ischemic white matter changes.    CXR 1/17/2024  IMPRESSION: Low lung volumes with right basilar atelectasis or scarring. Remainder of the lungs are clear. Cardiomediastinal silhouette within normal limits. No large pleural effusion. No significant interval change.     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 difficile (8/2022) and norovirus (10/2023) who presented to the ED today with diarrhea, fatigue, and weakness. We were asked to consult for diarrhea.     Differential includes viral versus bacterial infection, celiac disease, thyroid dysfunction, microscopic colitis, among others. Stool studies are pending, including enteric bacteria and viral panel, C. Diff and ova/parasites. TSH two months ago seen on chart review was normal. Ordered TTG-IgA today to evaluate for evaluate disease, but unlikely given duodenal biopsies in 2020 that was negative for celiac disease. We will wait until stool studies return before pursuing further testing. Discussed the possibility of needing a colonoscopy if workup is  unrevealing and diarrhea persists to evaluate for other potential causes like microscopic colitis, and she was amenable to this. Hold off on antidiarrheals while awaiting infectious testing.     PLAN:  -  Stool studies pending  -  Celiac labs pending  -  Further workup pending results of stool studies    Genevieve Encarnacion PA-C  [facility]  1/17/2024 3:07 PM  [phone] (office)    This case was discussed with Dr. Fong who agrees to the above assessment and plan.    45 minutes of total time was spent today providing patient care, including patient evaluation, reviewing documentation/test result, and .   ________________________________________________________________________     GI ATTENDING BRIEF ADDENDUM:  The patient was discussed with Genevieve Encarnacion PA-C.    Chart reviewed.    Agree with above.  Patient reports a few months of diarrhea.  She was seen for similar diarrhea in October 2023 in the hospital but the hospital chart indicates that her diarrhea had resolved prior to discharge.  Stool infectious studies have been ordered and results are pending.  We will also check celiac labs.  If stool testing is negative for any infectious process then would consider Imodium 2 pills every 6 hours to see if this will help.  If testing is negative and if patient's diarrhea persists then she will likely need a flexible sigmoidoscopy or colonoscopy with biopsies to check for microscopic colitis and mild inflammatory bowel disease.    25 minutes total time spent.    Beatrice Fong MD  [facility]

## 2024-01-17 NOTE — PHARMACY-VANCOMYCIN DOSING SERVICE
Pharmacy Vancomycin Initial Note  Date of Service 2024  Patient's  1945  78 year old, female    Indication: Sepsis    Current estimated CrCl = Estimated Creatinine Clearance: 48.1 mL/min (based on SCr of 0.76 mg/dL).    Creatinine for last 3 days  2024: 10:55 AM Creatinine 0.76 mg/dL    Recent Vancomycin Level(s) for last 3 days  No results found for requested labs within last 3 days.      Vancomycin IV Administrations (past 72 hours)                     vancomycin (VANCOCIN) 1,250 mg in sodium chloride 0.9 % 250 mL intermittent infusion (mg) 1,250 mg New Bag 24 1224                    Nephrotoxins and other renal medications (From now, onward)      Start     Dose/Rate Route Frequency Ordered Stop    24 0000  vancomycin (VANCOCIN) 500 mg vial to attach to  mL bag         500 mg  over 1 Hours Intravenous EVERY 12 HOURS 24 1516              Contrast Orders - past 72 hours (72h ago, onward)      None            [employer] Prediction of Planned Initial Vancomycin Regimen  Regimen: 500 mg IV every 12 hours.  Start time: 00:24 on 2024  Exposure target: AUC24 (range)400-600 mg/L.hr   AUC24,ss: 453 mg/L.hr  Probability of AUC24 > 400: 64 %  Ctrough,ss: 15.1 mg/L  Probability of Ctrough,ss > 20: 24 %  Probability of nephrotoxicity (Lodise ESTIVEN ): 10 %          Plan:  Start vancomycin  500 mg IV q12 hours. This will be timed to start on 24 00:00   Vancomycin monitoring method: AUC  Vancomycin therapeutic monitoring goal: 400-600 mg*h/L  Pharmacy will check vancomycin levels as appropriate in 1-3 Days.    Serum creatinine levels will be ordered daily for the first week of therapy and at least twice weekly for subsequent weeks.      MAKAYLA CARBAJAL, RP

## 2024-01-17 NOTE — PHARMACY-ADMISSION MEDICATION HISTORY
Pharmacist Admission Medication History    Admission medication history is complete. The information provided in this note is only as accurate as the sources available at the time of the update.    Information Source(s): Family member and CareEverywhere/SureScripts via phone    Pertinent Information: None    Changes made to PTA medication list:  Added: Meclizine  Deleted: Desonide cream  Changed: None    Medication Affordability:       Allergies reviewed with patient and updates made in EHR: yes    Medication History Completed By: Silvia Randhawa [facility] 1/17/2024 1:20 PM    PTA Med List   Medication Sig Last Dose    acetaminophen-codeine (TYLENOL #3) 300-30 MG per tablet Take 1 tablet by mouth every 6 hours as needed for severe pain 1/17/2024 at x1 am    ferrous sulfate (FEROSUL) 325 (65 Fe) MG tablet Take 1 tablet (325 mg) by mouth daily (with breakfast) 1/17/2024    gabapentin (NEURONTIN) 100 MG capsule Take 100 mg by mouth daily 1/17/2024    gabapentin (NEURONTIN) 100 MG capsule Take 300 mg by mouth at bedtime 1/16/2024    levETIRAcetam (KEPPRA) 750 MG tablet Take 1 tablet (750 mg) by mouth 2 times daily 1/17/2024 at x1 am    loperamide (IMODIUM A-D) 2 MG tablet Take 1 tablet (2 mg) by mouth 4 times daily as needed for diarrhea 1/17/2024 at x1 am    meclizine (ANTIVERT) 12.5 MG tablet Take 12.5 mg by mouth 2 times daily 1/17/2024 at x1 am    mirtazapine (REMERON) 15 MG tablet Take 1 tablet (15 mg) by mouth at bedtime 1/16/2024    Multiple Vitamin (MULTIVITAMIN) TABS Take 1 tablet by mouth daily 1/17/2024    nortriptyline (PAMELOR) 50 MG capsule Take 1 capsule (50 mg) by mouth at bedtime 1/16/2024    omeprazole (PRILOSEC) 40 MG DR capsule Take 1 capsule (40 mg) by mouth daily 1/17/2024    OXcarbazepine (TRILEPTAL) 150 MG tablet TAKE 3 TABLETS(450 MG) BY MOUTH DAILY 1/16/2024    polyvinyl alcohol (ARTIFICIAL TEARS) 1.4 % ophthalmic solution Place 1 drop into both eyes every hour as needed for dry eyes Unknown     psyllium (METAMUCIL) 58.6 % packet Take 1 packet by mouth daily 1/17/2024    QUEtiapine (SEROQUEL) 50 MG tablet Take 1 tablet (50 mg) by mouth at bedtime 1/16/2024    topiramate (TOPAMAX) 50 MG tablet Take 1 tablet (50 mg) by mouth at bedtime 1/16/2024    Vitamin D3 50 mcg (2000 units) tablet Take 1 tablet (50 mcg) by mouth daily 1/17/2024    Wound Dressings (TRIAD HYDROPHILIC WOUND DRESSI) PSTE Use for wound care dressing daily 1/17/2024

## 2024-01-17 NOTE — ED NOTES
Pt placement called regarding transfer and how far pt is willing to transfer to. Pt does not want to go anywhere that is too far from [facility], [facility] or [place] is too far. Advised provider and SOC.

## 2024-01-17 NOTE — ED NOTES
[facility] ED Handoff Report    ED Chief Complaint: Weakness, diarrhea, dehydration.     ED Diagnosis:  (E87.6) Hypokalemia  Comment: K+ 2.8.   Plan: Refused PO K+, replacing with IV. Cardiac monitoring, watch Mag too.     (R62.7) Adult failure to thrive  Comment: Frequent loose stools. Possible colitis.       PMH:    Past Medical History:   Diagnosis Date    Aspiration pneumonia (H) 02/2022    Depression     High cholesterol     Migraines     Pyloric ulcer, chronic     Sepsis (H) 08/10/2020    Trigeminal neuralgia         Code Status:  Full Code     Falls Risk: Yes Band: Applied    Current Living Situation/Residence: lives alone, daughter helps, lives 1 block away.     Elimination Status: Continent: Yes, using bed pan.     Activity Level: Total assist/lift, usually pivots well to and from wheel chair.     Patients Preferred Language:  English     Needed: No    Vital Signs:  /51   Pulse 72   Temp 97.9  F (36.6  C) (Oral)   Resp 18   Wt 49.9 kg (110 lb)   SpO2 94%   BMI 17.75 kg/m       Cardiac Rhythm: SR.     Pain Score: 3/10, head ache.     Is the Patient Confused:  No    Last Food or Drink: 01/17/24 at 1300    Focused Assessment:  Pt brought in by daughter after Pt was very weak and not able to take medications this AM. Pt has had several months of loose stools, and decreased appetite. K+ is 2.8, . K+ being supplemented via IV infusion. Pt had gotten IV ABX. Stool samples sent to lab.     Tests Performed: Done: Labs and Imaging    Treatments Provided:  IV ABX, IV K+ replacement.     Family Dynamics/Concerns: No    Family Updated On Visitor Policy: Yes    Plan of Care Communicated to Family: Yes    Who Was Updated about Plan of Care: Daughter  Alissa.     Belongings Checklist Done and Signed by Patient: Yes    Medications sent with patient: IV K+, Trileptal.     Covid: symptomatic, negative    Additional Information: Pt wheel chair bound but usually able to pivot to and from  chair with ease.     RN: Laith Ibarra RN 1/17/2024 4:02 PM

## 2024-01-17 NOTE — ED TRIAGE NOTES
Pt arrives from home with daughter who is primary caretaker. The patient has had generalized weakness, fatigue, and likely dehydration for past couple days. The patient describes her whole body as being sore. Pt has been falling asleep very suddenly the past couple days, which is not normal. Patient has had diarrhea since November and has a follow up with GI on 2/20.   Patient is hypotensive in triage - 85/58. MAP 63     Triage Assessment (Adult)       Row Name 01/17/24 1035          Triage Assessment    Airway WDL WDL        Respiratory WDL    Respiratory WDL WDL        Skin Circulation/Temperature WDL    Skin Circulation/Temperature WDL WDL        Cardiac WDL    Cardiac WDL WDL        Peripheral/Neurovascular WDL    Peripheral Neurovascular WDL WDL        Cognitive/Neuro/Behavioral WDL    Cognitive/Neuro/Behavioral WDL WDL

## 2024-01-17 NOTE — H&P
[facility]    History and Physical - Hospitalist Service       Date of Admission:  1/17/2024    Assessment & Plan      Julisa Chaney is a 78 year old female admitted on 1/17/2024. She has a hx of chronic pain, seizures, who has had diarrhea for months, comes in with dehydration, concern for hypotension, possible sepsis    1.Hypotension, weakness  -concern sepsis, vs dehydration, hypokalemia  -ivf bolus, bp better  -in ER got Cef + vanco to cover sepsis  -check stool cx  -tele  -check cortisol  -check procal  -check blood cx    2.Diarrhea  -for months  -was to see GI in clinic 2/20  -will ask GI see here  -send stools cx  -ivf  -no sign of bleeding  -?microscopic colitis , with prior norovirus just before this    3.Hypokalemia  -refused po k  -with high volume stools, iv replacement  -tele  -watch mag close  -check phos    4.Hypernatremia  -suspect due to dehydration  -she is able to take po-was drinking water  -IVF now NS  -replete volume  -getting free water with K iv  -trend NA q 6 hours    5.Hx of Sz  -on meds    6.hx of gerd  -on ppi    7.Failure to thrive--from ongoing stool issues  -lives alone  -will need Pt/OT eval    8.DVT prevent- scd    9.code-full                Diet:  reg  DVT Prophylaxis: Pneumatic Compression Devices  Abrams Catheter: Not present  Lines: None     Cardiac Monitoring: None  Code Status:  full    Clinically Significant Risk Factors Present on Admission        # Hypokalemia: Lowest K = 2.8 mmol/L in last 2 days, will replace as needed  # Hypernatremia: Highest Na = 146 mmol/L in last 2 days, will monitor as appropriate      # Hypoalbuminemia: Lowest albumin = 3.3 g/dL at 1/17/2024 10:55 AM, will monitor as appropriate                         Laxmi Paniagua MD  Hospitalist Service  [facility]  Securely message with [employer] (more info)  Text page via [employer] Paging/Directory      ______________________________________________________________________    Chief Complaint   Weakness, loose stools    History is obtained from the patient    History of Present Illness   Julisa Chaney is a 78 year old female admitted on 1/17/2024. She has a hx of chronic pain, seizures, who has had diarrhea for months, comes in with dehydration, concern for hypotension, possible sepsis    She notes for months at least 2 a lot of loose stools--daytime has 3 times/day and night time 4 times /day  -stool color brown, not black or red  -abd pain just before has stool, then gone  -no n/v/no fevers  -was to see GI in clinic 2/20/24    She lives alone and has felt weak getting up, no falls        Past Medical History    Past Medical History:   Diagnosis Date    Aspiration pneumonia (H) 02/2022    Depression     High cholesterol     Migraines     Pyloric ulcer, chronic     Sepsis (H) 08/10/2020    Trigeminal neuralgia        Past Surgical History   Past Surgical History:   Procedure Laterality Date    HYSTERECTOMY      IR GASTROSTOMY TUBE PERCUTANEOUS PLCMNT  8/16/2022    PICC TRIPLE LUMEN PLACEMENT  7/22/2022         SD ESOPHAGOGASTRODUODENOSCOPY TRANSORAL DIAGNOSTIC N/A 8/13/2020    Procedure: ESOPHAGOGASTRODUODENOSCOPY (EGD);  Surgeon: Nathan Smalls MD;  Location: [facility];  Service: Gastroenterology    SD ESOPHAGOGASTRODUODENOSCOPY TRANSORAL DIAGNOSTIC N/A 8/14/2020    Procedure: ESOPHAGOGASTRODUODENOSCOPY (EGD), PYLORIC DILATION;  Surgeon: Nathan Smalls MD;  Location: [facility];  Service: Gastroenterology    VENTRICULOSTOMY Left 7/31/2022    Procedure: LEFT FRONTAL VENTRICULOSTOMY;  Surgeon: Brady Heredia MD;  Location: [facility] COLONOSCOPY W/WO BRUSH/WASH N/A 8/13/2020    Procedure: COLONOSCOPY WITH POLYPECTOMY;  Surgeon: Nathan Smalls MD;  Location: [facility];  Service: Gastroenterology       Prior to Admission Medications   Prior to Admission Medications    Prescriptions Last Dose Informant Patient Reported? Taking?   Multiple Vitamin (MULTIVITAMIN) TABS 1/17/2024 Self No Yes   Sig: Take 1 tablet by mouth daily   OXcarbazepine (TRILEPTAL) 150 MG tablet 1/16/2024  No Yes   Sig: TAKE 3 TABLETS(450 MG) BY MOUTH DAILY   QUEtiapine (SEROQUEL) 50 MG tablet 1/16/2024  No Yes   Sig: Take 1 tablet (50 mg) by mouth at bedtime   Vitamin D3 50 mcg (2000 units) tablet 1/17/2024  No Yes   Sig: Take 1 tablet (50 mcg) by mouth daily   Wound Dressings (TRIAD HYDROPHILIC WOUND DRESSI) PSTE 1/17/2024  No Yes   Sig: Use for wound care dressing daily   acetaminophen-codeine (TYLENOL #3) 300-30 MG per tablet 1/17/2024 at x1 am  No Yes   Sig: Take 1 tablet by mouth every 6 hours as needed for severe pain   ferrous sulfate (FEROSUL) 325 (65 Fe) MG tablet 1/17/2024  No Yes   Sig: Take 1 tablet (325 mg) by mouth daily (with breakfast)   gabapentin (NEURONTIN) 100 MG capsule 1/17/2024  Yes Yes   Sig: Take 100 mg by mouth daily   gabapentin (NEURONTIN) 100 MG capsule 1/16/2024  Yes Yes   Sig: Take 300 mg by mouth at bedtime   levETIRAcetam (KEPPRA) 750 MG tablet 1/17/2024 at x1 am  No Yes   Sig: Take 1 tablet (750 mg) by mouth 2 times daily   loperamide (IMODIUM A-D) 2 MG tablet 1/17/2024 at x1 am  No Yes   Sig: Take 1 tablet (2 mg) by mouth 4 times daily as needed for diarrhea   meclizine (ANTIVERT) 12.5 MG tablet 1/17/2024 at x1 am  Yes Yes   Sig: Take 12.5 mg by mouth 2 times daily   mirtazapine (REMERON) 15 MG tablet 1/16/2024  No Yes   Sig: Take 1 tablet (15 mg) by mouth at bedtime   nortriptyline (PAMELOR) 50 MG capsule 1/16/2024  No Yes   Sig: Take 1 capsule (50 mg) by mouth at bedtime   omeprazole (PRILOSEC) 40 MG DR capsule 1/17/2024  No Yes   Sig: Take 1 capsule (40 mg) by mouth daily   polyvinyl alcohol (ARTIFICIAL TEARS) 1.4 % ophthalmic solution Unknown Self No Yes   Sig: Place 1 drop into both eyes every hour as needed for dry eyes   psyllium (METAMUCIL) 58.6 % packet 1/17/2024   Yes Yes   Sig: Take 1 packet by mouth daily   topiramate (TOPAMAX) 50 MG tablet 2024  No Yes   Sig: Take 1 tablet (50 mg) by mouth at bedtime      Facility-Administered Medications: None        Review of Systems    CONSTITUTIONAL:  positive for  fatigue and weight loss  EYES:  negative  HEENT:  negative  RESPIRATORY:  negative  CARDIOVASCULAR:  negative  GASTROINTESTINAL:  positive for diarrhea and abdominal pain  GENITOURINARY:  negative  INTEGUMENT/BREAST:  negative  HEMATOLOGIC/LYMPHATIC:  negative  ALLERGIC/IMMUNOLOGIC:  negative  ENDOCRINE:  negative  MUSCULOSKELETAL:  negative  NEUROLOGICAL:  hx of sz  BEHAVIOR/PSYCH:  negative    Social History   I have reviewed this patient's social history and updated it with pertinent information if needed.  Social History     Tobacco Use    Smoking status: Former     Packs/day: 1.00     Years: 50.00     Additional pack years: 0.00     Total pack years: 50.00     Types: Cigarettes     Quit date: 2014     Years since quittin.1    Smokeless tobacco: Never   Substance Use Topics    Alcohol use: No    Drug use: No         Family History   I have reviewed this patient's family history and updated it with pertinent information if needed.  Family History   Problem Relation Age of Onset    Cancer Father     Testicular cancer Grandchild 17.00    Breast Cancer Mother     Breast Cancer Sister     Breast Cancer Maternal Aunt     Breast Cancer Sister          Allergies   Allergies   Allergen Reactions    Contrast [Iohexol] Rash     Noticed during 2020 admission. Received IV contrast on     Chocolate Headache    Contrast Dye Rash    Penicillins Rash        Physical Exam   Vital Signs: Temp: 97.9  F (36.6  C) Temp src: Oral BP: 115/56 Pulse: 69   Resp: (!) 34 SpO2: 96 % O2 Device: None (Room air)    Weight: 110 lbs 0 oz    Constitutional: awake, fatigued, alert, cooperative, and no apparent distress  Eyes: sclera clear  ENT: normocepalic, without obvious  abnormality  Respiratory: no increased work of breathing, good air exchange, no retractions, and clear to auscultation  Cardiovascular: regular rate and rhythm and normal S1 and S2  GI: normal bowel sounds, soft, non-distended, and non-tender  Skin: no bruising or bleeding, dry skin on legs  Musculoskeletal: no lower extremity pitting edema present  Neurologic: Mental Status Exam:  Level of Alertness:   awake  Attention/Concentration:  normal  Language:  normal  Motor Exam:  moves all extremities well and symmetrically  Neuropsychiatric: General: normal, calm, and normal eye contact    Medical Decision Making       75 MINUTES SPENT BY ME on the date of service doing chart review, history, exam, documentation & further activities per the note.      Data     I have personally reviewed the following data over the past 24 hrs:    5.2  \   12.7   / 468 (H)     146 (H) 106 11.9 /  94   2.8 (L) 29 0.76 \     ALT: 8 AST: 12 AP: 140 TBILI: <0.2   ALB: 3.3 (L) TOT PROTEIN: 7.0 LIPASE: 7 (L)     Procal: 0.10 CRP: N/A Lactic Acid: 0.8         Imaging results reviewed over the past 24 hrs:   Recent Results (from the past 24 hour(s))   XR Chest 1 View    Narrative    EXAM: XR CHEST 1 VIEW  LOCATION: [facility]  DATE: 1/17/2024    INDICATION: FTT weakness  COMPARISON: 08/24/2023.      Impression    IMPRESSION: Low lung volumes with right basilar atelectasis or scarring. Remainder of the lungs are clear. Cardiomediastinal silhouette within normal limits. No large pleural effusion. No significant interval change.   CT Head w/o Contrast    Narrative    EXAM: CT HEAD W/O CONTRAST  LOCATION: [facility]  DATE: 1/17/2024    INDICATION: h o pyogenic brain abscess drained, now with SIRS and fatigue, no AMS  COMPARISON: Head CT 10/22/2023  TECHNIQUE: Routine CT Head without IV contrast. Multiplanar reformats. Dose reduction techniques were used.    FINDINGS:  INTRACRANIAL CONTENTS: No  intracranial hemorrhage. Mild diffuse cerebral parenchymal volume loss. No midline shift. The basilar cisterns are patent. Moderate-sized area of encephalomalacia involving the right cerebellar hemisphere. Small encephalomalacia   in the left cerebellar hemisphere. Mild periventricular and scattered foci of deep white matter hypodensities involving both cerebral hemispheres. No CT evidence for an acute infarct.    VISUALIZED ORBITS/SINUSES/MASTOIDS: Prior bilateral cataract surgery. Visualized portions of the orbits are otherwise unremarkable. Small mucous retention cysts in the posterior right ethmoid air cells. Mild nasoseptal bowing to the right anteriorly. No   middle ear or mastoid effusion.    BONES/SOFT TISSUES: No acute abnormality.      Impression    IMPRESSION:  1.  Stable compared to previous head CT 10/22/2023.  2.  No intracranial hemorrhage, mass lesions, hydrocephalus or CT evidence for an acute infarct.  3.  Moderate-sized area of encephalomalacia involving the right cerebellar hemisphere. Small encephalomalacia in the left cerebellar hemisphere.   4.  Mild diffuse cerebral parenchymal volume loss. Presumed chronic hypertensive/microvascular ischemic white matter changes.   CT Abdomen Pelvis w/o Contrast    Narrative    EXAM: CT ABDOMEN PELVIS W/O CONTRAST  LOCATION: [facility]  DATE: 1/17/2024    INDICATION: chronic ongoing diarrhea now with SIRS  COMPARISON: CT abdomen pelvis 07/19/2022  TECHNIQUE: CT scan of the abdomen and pelvis was performed without IV contrast. Multiplanar reformats were obtained. Dose reduction techniques were used.  CONTRAST: None.    FINDINGS:   LOWER CHEST: Dependent atelectasis in the right middle and lower lobe.    HEPATOBILIARY: Normal unenhanced liver contours. Gallbladder is present.    PANCREAS: Unremarkable.    SPLEEN: Unremarkable.    ADRENAL GLANDS: Unremarkable.    KIDNEYS/BLADDER: Normal unenhanced renal contours. No urolithiasis or  hydronephrosis. Normal bladder contours.    BOWEL: No bowel obstruction. Liquid stool throughout the colon. No evidence of toxic megacolon. No free fluid or free air.    LYMPH NODES: No lymphadenopathy.    VASCULATURE: Abdominal aorta is nonaneurysmal with severe atheromatous disease.    PELVIC ORGANS: Hysterectomy.    MUSCULOSKELETAL: No aggressive osseous lesions.      Impression    IMPRESSION:   1.  Liquid stool throughout the colon, nonspecific but can be seen in setting of enteritis/colitis.  2.  Additional details in the findings.

## 2024-01-17 NOTE — H&P (VIEW-ONLY)
"[facility] Consultation     Julisa Chaney  [address]  [place] [address]  78 year old female     Admission Date/Time: 1/17/2024  Primary Care Provider: Mara Murillo  Referring / Attending Physician:  Dr. 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 difficile (8/2022) and norovirus (10/2023) who presented to the ED today with diarrhea, fatigue, and weakness. We were asked to consult for diarrhea.     Today the patient reports that she has had ongoing diarrhea since October 2023 when she was last hospitalized.  She states that she has an average of 2 episodes of large volume diarrheal episodes during the day, and typically 3 episodes overnight.  She wears a pad, and often is unable to make it to the toilet in time.  She notes that frequently her pad is completely full if she is incontinent of stool.  She reports feeling like her lower abdomen is \"full\" even after she has a bowel movement. She denies any associated black or bloody stools.  She denies any abdominal pain, nausea, or vomiting.  She denies any fevers.  She denies any antibiotic use since her last hospitalization.    On chart review, the patient was admitted to the hospital on 10/16/2023 for diarrhea and fall.  At that time she had an enteric pathogen panel that was positive for norovirus, C. difficile testing was negative at that time.  Patient represented to the emergency department on 10/22/2023 for diarrhea and generalized weakness.  C. difficile testing was repeated at that time and was negative.  Per the discharge summary, the patient's symptoms improved during hospitalization, and she was discharged home on 10/24/2023.    The patient had a colonoscopy on 8/13/2020 for iron deficiency anemia.  This was significant for small hepatic flexure polyp that " was removed.  Prep was good to marginal, but most of the debris was able to be cleaned out.  Polyp biopsy was a tubular adenoma without evidence of dysplasia or malignancy, recall colonoscopy recommended for 5 years.  EGD 9/18/2024 abnormal CT imaging notable for diffuse gastritis and a duodenal stricture just beyond the pylorus that was erythematous and firm.  Biopsy obtained.  Unable to pass and second portion of duodenum.  Duodenal biopsy showed normal duodenal mucosa, negative for celiac disease.  Gastric biopsy showed reactive gastropathy, negative for H. pylori.    PAST MEDICAL HISTORY:  Patient Active Problem List    Diagnosis Date Noted    Adult failure to thrive 01/17/2024     Priority: Medium    Seizure disorder (H) 01/11/2024     Priority: Medium    Failure to thrive in adult 10/25/2023     Priority: Medium    Hypokalemia 10/22/2023     Priority: Medium    Diarrhea, unspecified type 10/22/2023     Priority: Medium    Falls frequently 07/06/2023     Priority: Medium    F11.2 - Continuous opioid dependence (H) 06/01/2023     Priority: Medium    Malnutrition, unspecified type (H24) 06/01/2023     Priority: Medium    Current mild episode of major depressive disorder without prior episode (H24) 06/01/2023     Priority: Medium    Pyogenic brain abscess 07/27/2022     Priority: Medium    Mild major depression (H) 05/04/2021     Priority: Medium    Pyloric ulcer, chronic 05/04/2021     Priority: Medium     Loosing wt, as appetite is not much       Formatting of this note might be different from the original.  Loosing wt, as appetite is not much      Abnormal chest CT 08/16/2020     Priority: Medium     CT 8/8/20-Tree-in-bud opacities involving the right upper and right middle   lobes of the lung are suspicious for an infectious/inflammatory process.   There is a more nodular appearing opacity in the right upper lobe on axial   image 138 of series 4 for which a   follow-up CT examination in three months is  recommended.            Chronic pain syndrome      Priority: Medium    Iron deficiency anemia due to chronic blood loss 2020     Priority: Medium     Added automatically from request for surgery [id number]      Formatting of this note might be different from the original.  Added automatically from request for surgery [id number]      Hypercalcemia 2019     Priority: Medium    Controlled substance agreement signed 2018     Priority: Medium    Osteopenia      Priority: Medium     Created by Conversion  Replacement Utility updated for latest IMO load      Formatting of this note might be different from the original.  Created by Conversion    Replacement Utility updated for latest IMO load      Migraine headache      Priority: Medium     Had MRI of head done 2022, for new left side weakness  Following neurologist   IMPRESSION:  1.  No evidence of acute large territorial infarction, acute intracranial hemorrhage, or mass lesion.  2.  Stable chronic bilateral cerebellar infarcts.  3.  Stable mild chronic small vessel ischemic change and mild diffuse brain parenchymal volume loss.      Counseling regarding advanced directives and goals of care 10/14/2014     Priority: Medium    Trigeminal neuralgia      Priority: Medium     Currently following neurologist table on trileptal , nortriptyline,kepra, topamax, and does take tylenol#3 daily      Hyperlipidemia      Priority: Medium     Created by Conversion      Formatting of this note might be different from the original.  Created by Conversion       SOCIAL HISTORY:  Social History     Tobacco Use    Smoking status: Former     Packs/day: 1.00     Years: 50.00     Additional pack years: 0.00     Total pack years: 50.00     Types: Cigarettes     Quit date: 2014     Years since quittin.1    Smokeless tobacco: Never   Substance Use Topics    Alcohol use: No    Drug use: No     FAMILY HISTORY:  Family History   Problem Relation Age of Onset    Cancer  Father     Testicular cancer Grandchild 17.00    Breast Cancer Mother     Breast Cancer Sister     Breast Cancer Maternal Aunt     Breast Cancer Sister      ALLERGIES:   Allergies   Allergen Reactions    Contrast [Iohexol] Rash     Noticed during 08/2020 admission. Received IV contrast on 8/5    Chocolate Headache    Contrast Dye Rash    Penicillins Rash     MEDICATIONS:   Current Facility-Administered Medications   Medication    acetaminophen-codeine (TYLENOL #3) 300-30 MG per tablet 1 tablet    calcium carbonate (TUMS) chewable tablet 1,000 mg    ceFEPIme (MAXIPIME) 1 g vial to attach to  mL bag for ADULTS or NS 50 mL bag for PEDS    [START ON 1/18/2024] gabapentin (NEURONTIN) capsule 100 mg    gabapentin (NEURONTIN) capsule 300 mg    levETIRAcetam (KEPPRA) tablet 750 mg    lidocaine (LMX4) cream    lidocaine 1 % 0.1-1 mL    meclizine (ANTIVERT) tablet 12.5 mg    mirtazapine (REMERON) tablet 15 mg    nortriptyline (PAMELOR) capsule 50 mg    [START ON 1/18/2024] omeprazole (PriLOSEC) CR capsule 40 mg    OXcarbazepine (TRILEPTAL) tablet 450 mg    polyvinyl alcohol (LIQUIFILM TEARS) 1.4 % ophthalmic solution 1 drop    potassium chloride 10 mEq in 100 mL sterile water infusion    potassium chloride 10 mEq in 100 mL sterile water infusion    QUEtiapine (SEROquel) half-tab 50 mg    sodium chloride (PF) 0.9% PF flush 3 mL    sodium chloride (PF) 0.9% PF flush 3 mL    sodium chloride 0.9 % infusion    topiramate (TOPAMAX) tablet 50 mg     Current Outpatient Medications   Medication    acetaminophen-codeine (TYLENOL #3) 300-30 MG per tablet    ferrous sulfate (FEROSUL) 325 (65 Fe) MG tablet    gabapentin (NEURONTIN) 100 MG capsule    gabapentin (NEURONTIN) 100 MG capsule    levETIRAcetam (KEPPRA) 750 MG tablet    loperamide (IMODIUM A-D) 2 MG tablet    meclizine (ANTIVERT) 12.5 MG tablet    mirtazapine (REMERON) 15 MG tablet    Multiple Vitamin (MULTIVITAMIN) TABS    nortriptyline (PAMELOR) 50 MG capsule     omeprazole (PRILOSEC) 40 MG DR capsule    OXcarbazepine (TRILEPTAL) 150 MG tablet    polyvinyl alcohol (ARTIFICIAL TEARS) 1.4 % ophthalmic solution    psyllium (METAMUCIL) 58.6 % packet    QUEtiapine (SEROQUEL) 50 MG tablet    topiramate (TOPAMAX) 50 MG tablet    Vitamin D3 50 mcg (2000 units) tablet    Wound Dressings (TRIAD HYDROPHILIC WOUND DRESSI) PSTE     PHYSICAL EXAM:   /56   Pulse 69   Temp 97.9  F (36.6  C) (Oral)   Resp (!) 34   Wt 49.9 kg (110 lb)   SpO2 96%   BMI 17.75 kg/m     GEN: No acute distress, thin, resting comfortably in bed  HEENT: No icterus  HRT: appears well perfused  LUNGS: No respiratory distress  ABD: soft, no abdominal tenderness  SKIN: Visualized skin intact without rash  MSKL: no LE edema  NEURO: Alert and grossly oriented     ADDITIONAL DATA:   I reviewed the patient's new clinical lab test results.   Recent Labs   Lab Test 01/17/24  1055 10/22/23  1105 10/16/23  1437 07/30/22  0559 07/29/22  1414 07/28/22  0408 07/27/22  2231 07/20/22  0923 07/19/22  2255   WBC 5.2 7.6 6.4   < >  --    < > 6.8   < >  --    HGB 12.7 15.6 13.2   < >  --    < > 9.6*   < >  --    MCV 87 87 87   < >  --    < > 73*   < >  --    * 426 410   < >  --    < > 547*   < >  --    INR  --   --   --   --  1.34*  --  1.13  --  1.36*    < > = values in this interval not displayed.     Recent Labs   Lab Test 01/17/24  1055 10/30/23  0652 10/26/23  0742 10/25/23  1928   POTASSIUM 2.8* 3.7 3.8 2.4*   CHLORIDE 106  --  105 102   CO2 29  --  29 30*   BUN 11.9  --  7.5* 8.7   ANIONGAP 11  --  11 10     Recent Labs   Lab Test 01/17/24  1155 01/17/24  1055 10/22/23  1105 08/24/23  1906 08/24/23  1721 07/02/23  0629 07/02/23  0407 07/19/22 2017 07/19/22 1901   ALBUMIN  --  3.3* 3.6  --  3.6   < >  --    < > 3.6   BILITOTAL  --  <0.2 0.2  --  <0.2   < >  --    < > 0.5   ALT  --  8 15  --  12   < >  --    < > 31   AST  --  12 16  --  20   < >  --    < > 25   PROTEIN 30*  --   --  Negative  --   --  10*   < >   --    LIPASE  --  7* 6*  --   --   --   --   --  <9    < > = values in this interval not displayed.     Imaging results:  CT abdomen/pelvis 1/17/2024  IMPRESSION:   1.  Liquid stool throughout the colon, nonspecific but can be seen in setting of enteritis/colitis.  2.  Additional details in the findings.     CT head w/o contrast 1/17/2024  IMPRESSION:  1.  Stable compared to previous head CT 10/22/2023.  2.  No intracranial hemorrhage, mass lesions, hydrocephalus or CT evidence for an acute infarct.  3.  Moderate-sized area of encephalomalacia involving the right cerebellar hemisphere. Small encephalomalacia in the left cerebellar hemisphere.   4.  Mild diffuse cerebral parenchymal volume loss. Presumed chronic hypertensive/microvascular ischemic white matter changes.    CXR 1/17/2024  IMPRESSION: Low lung volumes with right basilar atelectasis or scarring. Remainder of the lungs are clear. Cardiomediastinal silhouette within normal limits. No large pleural effusion. No significant interval change.     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 difficile (8/2022) and norovirus (10/2023) who presented to the ED today with diarrhea, fatigue, and weakness. We were asked to consult for diarrhea.     Differential includes viral versus bacterial infection, celiac disease, thyroid dysfunction, microscopic colitis, among others. Stool studies are pending, including enteric bacteria and viral panel, C. Diff and ova/parasites. TSH two months ago seen on chart review was normal. Ordered TTG-IgA today to evaluate for evaluate disease, but unlikely given duodenal biopsies in 2020 that was negative for celiac disease. We will wait until stool studies return before pursuing further testing. Discussed the possibility of needing a colonoscopy if workup is  unrevealing and diarrhea persists to evaluate for other potential causes like microscopic colitis, and she was amenable to this. Hold off on antidiarrheals while awaiting infectious testing.     PLAN:  -  Stool studies pending  -  Celiac labs pending  -  Further workup pending results of stool studies    Genevieve Encarnacion PA-C  [facility]  1/17/2024 3:07 PM  [phone] (office)    This case was discussed with Dr. Fong who agrees to the above assessment and plan.    45 minutes of total time was spent today providing patient care, including patient evaluation, reviewing documentation/test result, and .   ________________________________________________________________________     GI ATTENDING BRIEF ADDENDUM:  The patient was discussed with Genevieve Encarnacion PA-C.    Chart reviewed.    Agree with above.  Patient reports a few months of diarrhea.  She was seen for similar diarrhea in October 2023 in the hospital but the hospital chart indicates that her diarrhea had resolved prior to discharge.  Stool infectious studies have been ordered and results are pending.  We will also check celiac labs.  If stool testing is negative for any infectious process then would consider Imodium 2 pills every 6 hours to see if this will help.  If testing is negative and if patient's diarrhea persists then she will likely need a flexible sigmoidoscopy or colonoscopy with biopsies to check for microscopic colitis and mild inflammatory bowel disease.    25 minutes total time spent.    Beatrice Fong MD  [facility]

## 2024-01-17 NOTE — PHARMACY-VANCOMYCIN DOSING SERVICE
Pharmacy Vancomycin Initial Note  Date of Service 2024  Patient's  1945  78 year old, female    Indication: Sepsis    Current estimated CrCl = Estimated Creatinine Clearance: 58.9 mL/min (based on SCr of 0.62 mg/dL).    Creatinine for last 3 days  No results found for requested labs within last 3 days.    Recent Vancomycin Level(s) for last 3 days  No results found for requested labs within last 3 days.      Vancomycin IV Administrations (past 72 hours)        No vancomycin orders with administrations in past 72 hours.                    Nephrotoxins and other renal medications (From now, onward)      Start     Dose/Rate Route Frequency Ordered Stop    24 1130  vancomycin (VANCOCIN) 1,250 mg in sodium chloride 0.9 % 250 mL intermittent infusion         1,250 mg  over 90 Minutes Intravenous ONCE 24 1104              Contrast Orders - past 72 hours (72h ago, onward)      None                Plan:  Start vancomycin 1250 mg IV x 1    Silvia Randhawa, [facility]

## 2024-01-18 ENCOUNTER — APPOINTMENT (OUTPATIENT)
Dept: PHYSICAL THERAPY | Facility: HOSPITAL | Age: 79
DRG: 391 | End: 2024-01-18
Attending: INTERNAL MEDICINE
Payer: COMMERCIAL

## 2024-01-18 ENCOUNTER — ANESTHESIA EVENT (OUTPATIENT)
Dept: SURGERY | Facility: HOSPITAL | Age: 79
DRG: 391 | End: 2024-01-18
Payer: COMMERCIAL

## 2024-01-18 ENCOUNTER — APPOINTMENT (OUTPATIENT)
Dept: OCCUPATIONAL THERAPY | Facility: HOSPITAL | Age: 79
DRG: 391 | End: 2024-01-18
Attending: INTERNAL MEDICINE
Payer: COMMERCIAL

## 2024-01-18 LAB
ANION GAP SERPL CALCULATED.3IONS-SCNC: 9 MMOL/L (ref 7–15)
BUN SERPL-MCNC: 7.8 MG/DL (ref 8–23)
CALCIUM SERPL-MCNC: 8.3 MG/DL (ref 8.8–10.2)
CHLORIDE SERPL-SCNC: 109 MMOL/L (ref 98–107)
CREAT SERPL-MCNC: 0.62 MG/DL (ref 0.51–0.95)
DEPRECATED HCO3 PLAS-SCNC: 28 MMOL/L (ref 22–29)
EGFRCR SERPLBLD CKD-EPI 2021: >90 ML/MIN/1.73M2
ERYTHROCYTE [DISTWIDTH] IN BLOOD BY AUTOMATED COUNT: 15.2 % (ref 10–15)
GLUCOSE SERPL-MCNC: 83 MG/DL (ref 70–99)
HCT VFR BLD AUTO: 41.3 % (ref 35–47)
HGB BLD-MCNC: 12.3 G/DL (ref 11.7–15.7)
MAGNESIUM SERPL-MCNC: 1.6 MG/DL (ref 1.7–2.3)
MCH RBC QN AUTO: 26.1 PG (ref 26.5–33)
MCHC RBC AUTO-ENTMCNC: 29.8 G/DL (ref 31.5–36.5)
MCV RBC AUTO: 88 FL (ref 78–100)
MRSA DNA SPEC QL NAA+PROBE: POSITIVE
PLATELET # BLD AUTO: 427 10E3/UL (ref 150–450)
POTASSIUM SERPL-SCNC: 2.9 MMOL/L (ref 3.4–5.3)
POTASSIUM SERPL-SCNC: 3.4 MMOL/L (ref 3.4–5.3)
POTASSIUM SERPL-SCNC: 4.3 MMOL/L (ref 3.4–5.3)
RBC # BLD AUTO: 4.71 10E6/UL (ref 3.8–5.2)
SA TARGET DNA: POSITIVE
SODIUM SERPL-SCNC: 144 MMOL/L (ref 135–145)
SODIUM SERPL-SCNC: 144 MMOL/L (ref 135–145)
SODIUM SERPL-SCNC: 145 MMOL/L (ref 135–145)
SODIUM SERPL-SCNC: 146 MMOL/L (ref 135–145)
SODIUM SERPL-SCNC: 146 MMOL/L (ref 135–145)
TTG IGA SER-ACNC: 2.1 U/ML
WBC # BLD AUTO: 6.3 10E3/UL (ref 4–11)

## 2024-01-18 PROCEDURE — 250N000013 HC RX MED GY IP 250 OP 250 PS 637: Performed by: PHYSICIAN ASSISTANT

## 2024-01-18 PROCEDURE — 210N000001 HC R&B IMCU HEART CARE

## 2024-01-18 PROCEDURE — 87641 MR-STAPH DNA AMP PROBE: CPT | Performed by: INTERNAL MEDICINE

## 2024-01-18 PROCEDURE — 84132 ASSAY OF SERUM POTASSIUM: CPT | Performed by: INTERNAL MEDICINE

## 2024-01-18 PROCEDURE — 97530 THERAPEUTIC ACTIVITIES: CPT | Mod: GP

## 2024-01-18 PROCEDURE — 250N000011 HC RX IP 250 OP 636: Performed by: INTERNAL MEDICINE

## 2024-01-18 PROCEDURE — 87640 STAPH A DNA AMP PROBE: CPT | Performed by: INTERNAL MEDICINE

## 2024-01-18 PROCEDURE — 36415 COLL VENOUS BLD VENIPUNCTURE: CPT | Performed by: INTERNAL MEDICINE

## 2024-01-18 PROCEDURE — 80048 BASIC METABOLIC PNL TOTAL CA: CPT | Performed by: INTERNAL MEDICINE

## 2024-01-18 PROCEDURE — 84295 ASSAY OF SERUM SODIUM: CPT | Performed by: INTERNAL MEDICINE

## 2024-01-18 PROCEDURE — 258N000003 HC RX IP 258 OP 636: Performed by: INTERNAL MEDICINE

## 2024-01-18 PROCEDURE — 86364 TISS TRNSGLTMNASE EA IG CLAS: CPT | Performed by: PHYSICIAN ASSISTANT

## 2024-01-18 PROCEDURE — G0008 ADMIN INFLUENZA VIRUS VAC: HCPCS | Performed by: INTERNAL MEDICINE

## 2024-01-18 PROCEDURE — 99233 SBSQ HOSP IP/OBS HIGH 50: CPT | Performed by: INTERNAL MEDICINE

## 2024-01-18 PROCEDURE — 250N000013 HC RX MED GY IP 250 OP 250 PS 637: Performed by: INTERNAL MEDICINE

## 2024-01-18 PROCEDURE — 85027 COMPLETE CBC AUTOMATED: CPT | Performed by: INTERNAL MEDICINE

## 2024-01-18 PROCEDURE — 97166 OT EVAL MOD COMPLEX 45 MIN: CPT | Mod: GO

## 2024-01-18 PROCEDURE — 36415 COLL VENOUS BLD VENIPUNCTURE: CPT | Performed by: PHYSICIAN ASSISTANT

## 2024-01-18 PROCEDURE — 97161 PT EVAL LOW COMPLEX 20 MIN: CPT | Mod: GP

## 2024-01-18 PROCEDURE — 90662 IIV NO PRSV INCREASED AG IM: CPT | Performed by: INTERNAL MEDICINE

## 2024-01-18 PROCEDURE — 83735 ASSAY OF MAGNESIUM: CPT | Performed by: INTERNAL MEDICINE

## 2024-01-18 RX ORDER — NALOXONE HYDROCHLORIDE 0.4 MG/ML
0.2 INJECTION, SOLUTION INTRAMUSCULAR; INTRAVENOUS; SUBCUTANEOUS
Status: DISCONTINUED | OUTPATIENT
Start: 2024-01-18 | End: 2024-01-24 | Stop reason: HOSPADM

## 2024-01-18 RX ORDER — POTASSIUM CHLORIDE 7.45 MG/ML
10 INJECTION INTRAVENOUS
Status: COMPLETED | OUTPATIENT
Start: 2024-01-18 | End: 2024-01-18

## 2024-01-18 RX ORDER — CEFAZOLIN SODIUM 1 G/50ML
1250 SOLUTION INTRAVENOUS EVERY 24 HOURS
Status: DISCONTINUED | OUTPATIENT
Start: 2024-01-18 | End: 2024-01-19

## 2024-01-18 RX ORDER — MAGNESIUM SULFATE HEPTAHYDRATE 40 MG/ML
2 INJECTION, SOLUTION INTRAVENOUS ONCE
Status: COMPLETED | OUTPATIENT
Start: 2024-01-18 | End: 2024-01-18

## 2024-01-18 RX ORDER — BISACODYL 5 MG/1
TABLET, DELAYED RELEASE ORAL
Qty: 4 TABLET | Refills: 0 | Status: SHIPPED | OUTPATIENT
Start: 2024-01-18 | End: 2024-01-24

## 2024-01-18 RX ORDER — POTASSIUM CHLORIDE 7.45 MG/ML
10 INJECTION INTRAVENOUS ONCE
Status: COMPLETED | OUTPATIENT
Start: 2024-01-18 | End: 2024-01-18

## 2024-01-18 RX ORDER — NALOXONE HYDROCHLORIDE 0.4 MG/ML
0.4 INJECTION, SOLUTION INTRAMUSCULAR; INTRAVENOUS; SUBCUTANEOUS
Status: DISCONTINUED | OUTPATIENT
Start: 2024-01-18 | End: 2024-01-24 | Stop reason: HOSPADM

## 2024-01-18 RX ORDER — MAGNESIUM CARB/ALUMINUM HYDROX 105-160MG
296 TABLET,CHEWABLE ORAL ONCE
Status: COMPLETED | OUTPATIENT
Start: 2024-01-19 | End: 2024-01-19

## 2024-01-18 RX ADMIN — OXCARBAZEPINE 450 MG: 150 TABLET, FILM COATED ORAL at 07:57

## 2024-01-18 RX ADMIN — INFLUENZA A VIRUS A/VICTORIA/4897/2022 IVR-238 (H1N1) ANTIGEN (FORMALDEHYDE INACTIVATED), INFLUENZA A VIRUS A/DARWIN/9/2021 SAN-010 (H3N2) ANTIGEN (FORMALDEHYDE INACTIVATED), INFLUENZA B VIRUS B/PHUKET/3073/2013 ANTIGEN (FORMALDEHYDE INACTIVATED), AND INFLUENZA B VIRUS B/MICHIGAN/01/2021 ANTIGEN (FORMALDEHYDE INACTIVATED) 0.7 ML: 60; 60; 60; 60 INJECTION, SUSPENSION INTRAMUSCULAR at 11:58

## 2024-01-18 RX ADMIN — POTASSIUM CHLORIDE 10 MEQ: 7.46 INJECTION, SOLUTION INTRAVENOUS at 11:48

## 2024-01-18 RX ADMIN — POLYETHYLENE GLYCOL 3350, SODIUM SULFATE ANHYDROUS, SODIUM BICARBONATE, SODIUM CHLORIDE, POTASSIUM CHLORIDE 4000 ML: 236; 22.74; 6.74; 5.86; 2.97 POWDER, FOR SOLUTION ORAL at 18:15

## 2024-01-18 RX ADMIN — CEFEPIME HYDROCHLORIDE 1 G: 1 INJECTION, POWDER, FOR SOLUTION INTRAMUSCULAR; INTRAVENOUS at 11:49

## 2024-01-18 RX ADMIN — MIRTAZAPINE 15 MG: 15 TABLET, FILM COATED ORAL at 21:53

## 2024-01-18 RX ADMIN — POTASSIUM CHLORIDE 10 MEQ: 7.46 INJECTION, SOLUTION INTRAVENOUS at 10:38

## 2024-01-18 RX ADMIN — QUETIAPINE FUMARATE 50 MG: 25 TABLET ORAL at 21:53

## 2024-01-18 RX ADMIN — LEVETIRACETAM 750 MG: 500 TABLET, FILM COATED ORAL at 20:26

## 2024-01-18 RX ADMIN — SODIUM CHLORIDE: 9 INJECTION, SOLUTION INTRAVENOUS at 17:00

## 2024-01-18 RX ADMIN — PANTOPRAZOLE SODIUM 40 MG: 40 TABLET, DELAYED RELEASE ORAL at 06:44

## 2024-01-18 RX ADMIN — GABAPENTIN 100 MG: 100 CAPSULE ORAL at 07:56

## 2024-01-18 RX ADMIN — TOPIRAMATE 50 MG: 25 TABLET, FILM COATED ORAL at 21:53

## 2024-01-18 RX ADMIN — LEVETIRACETAM 750 MG: 500 TABLET, FILM COATED ORAL at 07:56

## 2024-01-18 RX ADMIN — ACETAMINOPHEN AND CODEINE PHOSPHATE 1 TABLET: 300; 30 TABLET ORAL at 21:58

## 2024-01-18 RX ADMIN — PANTOPRAZOLE SODIUM 40 MG: 40 TABLET, DELAYED RELEASE ORAL at 17:08

## 2024-01-18 RX ADMIN — MECLIZINE 12.5 MG: 12.5 TABLET ORAL at 07:57

## 2024-01-18 RX ADMIN — ACETAMINOPHEN AND CODEINE PHOSPHATE 1 TABLET: 300; 30 TABLET ORAL at 06:44

## 2024-01-18 RX ADMIN — POTASSIUM CHLORIDE 10 MEQ: 7.46 INJECTION, SOLUTION INTRAVENOUS at 12:56

## 2024-01-18 RX ADMIN — ACETAMINOPHEN AND CODEINE PHOSPHATE 1 TABLET: 300; 30 TABLET ORAL at 15:27

## 2024-01-18 RX ADMIN — CEFEPIME HYDROCHLORIDE 1 G: 1 INJECTION, POWDER, FOR SOLUTION INTRAMUSCULAR; INTRAVENOUS at 22:00

## 2024-01-18 RX ADMIN — VANCOMYCIN HYDROCHLORIDE 500 MG: 500 INJECTION, POWDER, LYOPHILIZED, FOR SOLUTION INTRAVENOUS at 00:08

## 2024-01-18 RX ADMIN — MAGNESIUM SULFATE HEPTAHYDRATE 2 G: 40 INJECTION, SOLUTION INTRAVENOUS at 07:51

## 2024-01-18 RX ADMIN — POTASSIUM CHLORIDE 10 MEQ: 7.46 INJECTION, SOLUTION INTRAVENOUS at 16:17

## 2024-01-18 RX ADMIN — VANCOMYCIN HYDROCHLORIDE 1250 MG: 5 INJECTION, POWDER, LYOPHILIZED, FOR SOLUTION INTRAVENOUS at 13:34

## 2024-01-18 RX ADMIN — POTASSIUM CHLORIDE 10 MEQ: 7.46 INJECTION, SOLUTION INTRAVENOUS at 15:09

## 2024-01-18 RX ADMIN — MECLIZINE 12.5 MG: 12.5 TABLET ORAL at 20:23

## 2024-01-18 RX ADMIN — POTASSIUM CHLORIDE 10 MEQ: 7.46 INJECTION, SOLUTION INTRAVENOUS at 14:01

## 2024-01-18 RX ADMIN — NORTRIPTYLINE HYDROCHLORIDE 50 MG: 50 CAPSULE ORAL at 21:53

## 2024-01-18 RX ADMIN — GABAPENTIN 300 MG: 300 CAPSULE ORAL at 21:53

## 2024-01-18 ASSESSMENT — ACTIVITIES OF DAILY LIVING (ADL)
ADLS_ACUITY_SCORE: 49
ADLS_ACUITY_SCORE: 47
ADLS_ACUITY_SCORE: 49
ADLS_ACUITY_SCORE: 49
ADLS_ACUITY_SCORE: 47
ADLS_ACUITY_SCORE: 47
ADLS_ACUITY_SCORE: 49

## 2024-01-18 NOTE — UTILIZATION REVIEW
Admission Status; Secondary Review Determination       Under the authority of the Utilization Management Committee, the utilization review process indicated a secondary review on the above patient. The review outcome is based on review of the medical records, discussions with staff, and applying clinical experience noted on the date of the review.     (x) Inpatient Status Appropriate - This patient's medical care is consistent with medical management for inpatient care and reasonable inpatient medical practice.     RATIONALE FOR DETERMINATION:  78 year old female admitted on 1/17/2024. She has a hx of chronic pain, seizures, who has had diarrhea for months with it escalating lately.  She presents with dehydration, concern for hypotension, possible sepsis.  GI has seen her and is performing a colonoscopy tomorrow.  She continues with GI losses and remains on IV fluids and empiric IV antibiotics for possible sepsis.  Given ongoing GI complaints/IVF need and IV Antibiotic need she cannot discharge and needs ongoing hospital care.   At the time of admission it was felt she  likely needed at least a couple days hospital stay.    At the time of admission with the information available to the attending physician more than 2 nights Hospital complex care was anticipated, based on patient risk of adverse outcome if treated as outpatient and complex care required. Inpatient admission is appropriate based on the Medicare guidelines.   The information on this document is developed by the utilization review team in order for the business office to ensure compliance. This only denotes the appropriateness of proper admission status and does not reflect the quality of care rendered.   The definitions of Inpatient Status and Observation Status used in making the determination above are those provided in the CMS Coverage Manual, Chapter 1 and Chapter 6, section 70.4.     Sincerely,     Bismark Temple DO, [facility]  Utilization  Review  Physician Advisor

## 2024-01-18 NOTE — PROGRESS NOTES
[facility] Progress Note     SUBJECTIVE:    The patient denies any diarrhea overnight, but notes having a lot of gas. She denies any abdominal pain, but notes lower abdominal fullness. Denies any nausea or vomiting.     OBJECTIVE:  /63 (BP Location: Left arm)   Pulse 86   Temp 98.3  F (36.8  C) (Oral)   Resp 18   Wt 51.1 kg (112 lb 10.5 oz)   SpO2 93%   BMI 18.18 kg/m    Temp (24hrs), Av.3  F (36.8  C), Min:97.9  F (36.6  C), Max:98.9  F (37.2  C)    Patient Vitals for the past 72 hrs:   Weight   24 0429 51.1 kg (112 lb 10.5 oz)   24 1033 49.9 kg (110 lb)     Intake/Output Summary (Last 24 hours) at 2024 0808  Last data filed at 2024 0646  Gross per 24 hour   Intake 2123 ml   Output --   Net 2123 ml     PHYSICAL EXAM  GEN: resting comfortably in bed  HRT: no LE edema  RESP: unlabored  ABD: Soft and non-tender  SKIN: Visualized skin intact, no rash    Additional Data:  I have reviewed the patient's new clinical lab results:     Recent Labs   Lab Test 24  0633 24  1055 10/22/23  1105 22  0559 22  1414 22  0408 22  2231 22  0923 22  2255   WBC 6.3 5.2 7.6   < >  --    < > 6.8   < >  --    HGB 12.3 12.7 15.6   < >  --    < > 9.6*   < >  --    MCV 88 87 87   < >  --    < > 73*   < >  --     468* 426   < >  --    < > 547*   < >  --    INR  --   --   --   --  1.34*  --  1.13  --  1.36*    < > = values in this interval not displayed.     Recent Labs   Lab Test 24  0633 24  0004 24  1055 10/30/23  0652 10/26/23  0742   POTASSIUM 2.9* 4.3 2.8*   < > 3.8   CHLORIDE 109*  --  106  --  105   CO2 28  --  29  --  29   BUN 7.8*  --  11.9  --  7.5*   ANIONGAP 9  --  11  --  11    < > = values in this interval not displayed.     Recent Labs   Lab Test 24  1155 24  1055 10/22/23  1105 23  1906 23  1721 23  0629 23  0407 22  2017 22  1901   ALBUMIN  --  3.3* 3.6  --   3.6   < >  --    < > 3.6   BILITOTAL  --  <0.2 0.2  --  <0.2   < >  --    < > 0.5   ALT  --  8 15  --  12   < >  --    < > 31   AST  --  12 16  --  20   < >  --    < > 25   PROTEIN 30*  --   --  Negative  --   --  10*   < >  --    LIPASE  --  7* 6*  --   --   --   --   --  <9    < > = values in this interval not displayed.     Enteric bacteria and virus panel PCR 1/17/2024-Negative  C. Diff toxin B PCR w/ reflex-Negative  Routine parasitology exam-pending    Imaging results:  CT abdomen/pelvis 1/17/2024  IMPRESSION:   1.  Liquid stool throughout the colon, nonspecific but can be seen in setting of enteritis/colitis.  2.  Additional details in the findings.      CT head w/o contrast 1/17/2024  IMPRESSION:  1.  Stable compared to previous head CT 10/22/2023.  2.  No intracranial hemorrhage, mass lesions, hydrocephalus or CT evidence for an acute infarct.  3.  Moderate-sized area of encephalomalacia involving the right cerebellar hemisphere. Small encephalomalacia in the left cerebellar hemisphere.   4.  Mild diffuse cerebral parenchymal volume loss. Presumed chronic hypertensive/microvascular ischemic white matter changes.     CXR 1/17/2024  IMPRESSION: Low lung volumes with right basilar atelectasis or scarring. Remainder of the lungs are clear. Cardiomediastinal silhouette within normal limits. No large pleural effusion. No significant interval change.      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 difficile (8/2022) and norovirus (10/2023) who presented to the ED today with diarrhea, fatigue, and weakness. We were asked to consult for diarrhea.      Differential includes viral versus bacterial infection, celiac disease, thyroid dysfunction, microscopic colitis, among others. Stool studies are pending, including enteric bacteria and viral panel, C.  Diff and ova/parasites. TSH two months ago seen on chart review was normal. TTG-IgA is pending to evaluate for celiac disease. Enteric bacteria and viral stool panel and C. Diff stool test are negative.  Discussed the possibility of needing a colonoscopy if workup is unrevealing and diarrhea persists to evaluate for other potential causes like microscopic colitis, and she was amenable to this, and would like to proceed with the procedure tomorrow. She is on a PPI which increases her risk of microscopic colitis. Ok to use loperamide 2 tablets every 6 hours once she gives stool sample to evaluate for parasites.      PLAN:  -Stool studies negative  -Parasite stool studies pending  -Celiac labs pending  -Can start Imodium 2 tablets q6h after stool is obtained  -Colonoscopy scheduled for tomorrow    (Dr. Fong)  Genevieve Encarnacion PA-C  [facility]  1/18/2024 8:08 AM  [phone] (office)    25 minutes of total time was spent providing patient care, including patient evaluation, reviewing documentation/test results, , and documentation.  ________________________________________________________________________

## 2024-01-18 NOTE — PLAN OF CARE
Problem: Electrolyte Imbalance  Goal: Electrolyte Balance  Outcome: Progressing     Problem: Fatigue  Goal: Improved Activity Tolerance  Outcome: Progressing    Problem: Adult Inpatient Plan of Care  Goal: Optimal Comfort and Wellbeing  Outcome: Progressing      Pt A/Ox3. Reported 9/10 pain, PRN tylenol/codeine given per orders, intervention effective per pt. K protocol, IV doses given x6 per orders, recheck at 1800. Na at 1300 144, recheck at 1800. IV Mag replaced x1 this AM per Dr. Paniagua, recheck 1/19 AM. Up with an assist of 1-2 with walker/belt. Unable to collect stool sample this shift d/t urine/stool mix. Plan for colonoscopy 1/19. NPO at midnight. Golytley started at 1800 per orders.

## 2024-01-18 NOTE — PROGRESS NOTES
SPIRITUAL HEALTH SERVICES Note     Saw pt Julisa Chaney declined administered Gnosticism sacrament of anointing for the healing of the sick.    Fr. Moisés Mark

## 2024-01-18 NOTE — CONSULTS
"Care Management Initial Consult    General Information  Assessment completed with: Children, daughter via phone  Type of CM/SW Visit: Initial Assessment    Primary Care Provider verified and updated as needed: Yes   Readmission within the last 30 days: no previous admission in last 30 days      Reason for Consult: discharge planning  Advance Care Planning: Advance Care Planning Reviewed: no concerns identified          Communication Assessment  Patient's communication style: spoken language (English or Bilingual)    Hearing Difficulty or Deaf: no   Wear Glasses or Blind: yes      Living Environment:   People in home: alone     Current living Arrangements: house (\"Ramp to get inside the house and then she lives on the main level inside\".)      Able to return to prior arrangements: other (see comments) (likely needs TCU)       Family/Social Support:  Care provided by: self, child(geetha)  Provides care for: no one, unable/limited ability to care for self  Marital Status:   Children          Description of Support System: Supportive, Involved    Support Assessment: Adequate family and caregiver support, Adequate social supports, Patient communicates needs well met    Current Resources:   Patient receiving home care services: Yes  Skilled Home Care Services: Physical Therapy (\"I think RN and OT were done. She was going to finish with PT on 1/19/24.\")  Community Resources: Home Care, DME  Equipment currently used at home: walker, rolling, wheelchair, manual, grab bar, toilet, grab bar, tub/shower, shower chair, raised toilet seat (\"she acutally has a commode, but uses it over the toilet as a raised seat and rails for now\")  Supplies currently used at home: Other (\"glasses, having to wear incontinence pads but that is because of the diarrhea\")    Employment/Financial:  Employment Status: retired     Employment/ Comments: \"my  had  benefits, but nothing that I use\"  Financial Concerns:   " "  Referral to Financial Worker: No       Does the patient's insurance plan have a 3 day qualifying hospital stay waiver?  Yes     Which insurance plan 3 day waiver is available? Alternative insurance waiver    Will the waiver be used for post-acute placement? Undetermined at this time      Functional Status:  Prior to admission patient needed assistance:   Dependent ADLs:: Wheelchair-independent, Independent  Dependent IADLs:: Cleaning, Transportation, Cooking, Laundry, Shopping, Meal Preparation, Medication Management  Assesssment of Functional Status: Not at baseline with ADL Functioning, Not at baseline with mobility, Not at  functional baseline    Mental Health Status:          Chemical Dependency Status:                Values/Beliefs:  Spiritual, Cultural Beliefs, [employer]ian Practices, Values that affect care:                 Additional Information:  Julisa lives in a house alone. She is independent with ADLs at baseline and daughter Alissa helps with all IADLs. \"My daughter helps with stand by assist when I shower and then with anything else I need. I can usually dress myself, but not now. I cannot care for myself now. My daughter and grandkids help with anything I would need. They stop by for meals and medications 3 times a day to help me\".     Has [facility] PT. \"I think RN and OT were done. She was going to finish with PT on 1/19/24.\"     \"I mostly use the wheelchair all the time. I do use the walker for short distances like into the bathroom at home\".     She wants to discharge to TCU. She was at [facility] in July and October-November and wants to return there. Referral placed, but may need other choices after PT/OT gives recommendations.     [facility] transport at discharge.     Alissa daughter [phone].     CM to follow for medical progression of care, discharge recommendations, and final discharge plan.    Ning Dougherty RN    "

## 2024-01-18 NOTE — TELEPHONE ENCOUNTER
Writer called and talked with Pt's daughter Alissa. Per Alissa, Pt is currently in the ER for symptoms. Alissa would like to hold off on scheduling a sooner appointment for now and will call Writer back once they are ready.  left call back number for Pt's daughter.

## 2024-01-18 NOTE — PHARMACY-VANCOMYCIN DOSING SERVICE
Pharmacy Vancomycin Note  Date of Service 2024  Patient's  1945   78 year old, female    Indication: Sepsis  Day of Therapy: 2  Current vancomycin regimen:  500 mg IV q12h  Current vancomycin monitoring method: AUC  Current vancomycin therapeutic monitoring goal: 400-600 mg*h/L    InsightRX Prediction of Current Vancomycin Regimen  Loading dose: N/A  Regimen: 500 mg IV every 12 hours.  Start time: 12:08 on 2024  Exposure target: AUC24 (range)400-600 mg/L.hr   AUC24,ss: 386 mg/L.hr  Probability of AUC24 > 400: 46 %  Ctrough,ss: 12.5 mg/L  Probability of Ctrough,ss > 20: 14 %  Probability of nephrotoxicity (Lodise ESTIVEN ): 8 %    Current estimated CrCl = Estimated Creatinine Clearance: 60.3 mL/min (based on SCr of 0.62 mg/dL).    Creatinine for last 3 days  2024: 10:55 AM Creatinine 0.76 mg/dL  2024:  6:33 AM Creatinine 0.62 mg/dL    Recent Vancomycin Levels (past 3 days)  No results found for requested labs within last 3 days.    Vancomycin IV Administrations (past 72 hours)                     vancomycin (VANCOCIN) 500 mg vial to attach to  mL bag (mg) 500 mg New Bag 24 0008    vancomycin (VANCOCIN) 1,250 mg in sodium chloride 0.9 % 250 mL intermittent infusion (mg) 1,250 mg New Bag 24 1224                    Nephrotoxins and other renal medications (From now, onward)      Start     Dose/Rate Route Frequency Ordered Stop    24 1200  vancomycin (VANCOCIN) 1,250 mg in sodium chloride 0.9 % 250 mL intermittent infusion         1,250 mg  over 90 Minutes Intravenous EVERY 24 HOURS 24 0730                 Contrast Orders - past 72 hours (72h ago, onward)      None            Interpretation of levels and current regimen:  Vancomycin level predicting AUC less than 400    Has serum creatinine changed greater than 50% in last 72 hours: No    Urine output: unable to determine    Renal Function: Stable    InsightRX Prediction of Planned New Vancomycin  Regimen  Loading dose: N/A  Regimen: 1250 mg IV every 24 hours.  Start time: 12:08 on 01/18/2024  Exposure target: AUC24 (range)400-600 mg/L.hr   AUC24,ss: 482 mg/L.hr  Probability of AUC24 > 400: 70 %  Ctrough,ss: 12.8 mg/L  Probability of Ctrough,ss > 20: 18 %  Probability of nephrotoxicity (Lodise ESTIVEN 2009): 8 %    Plan:  Increase Dose to 1250 mg IV every 24 hours  Vancomycin monitoring method: AUC  Vancomycin therapeutic monitoring goal: 400-600 mg*h/L  Pharmacy will check vancomycin levels as appropriate in 1-3 Days. Will get level tomorrow morning if vancomycin is continued.   Serum creatinine levels will be ordered daily for the first week of therapy and at least twice weekly for subsequent weeks.    CLIFFORD MATTA, [facility]

## 2024-01-18 NOTE — CONSULTS
"CLINICAL NUTRITION SERVICES  -  ASSESSMENT NOTE    Recommendations Ordered by Registered Dietitian (RD):   Multivitamin with minerals  Ensure Clear Apple or Berry OK  Gel+ Cherry (hates Pineapple and SF Gel+ flavors ok if out of Cherry)    Future/Additional Recommendations: Update supplements as diet advances   Malnutrition:   % Weight Loss:  > 7.5% in 3 months (severe malnutrition)  % Intake:  Decreased intake does not meet criteria for malnutrition  Subcutaneous Fat Loss:  Orbital region moderate depletion and Upper arm region mild depletion  Muscle Loss:  Temporal region moderate depletion, Clavicle bone region moderate depletion, Upper arm region moderate depletion, Dorsal hand region severe depletion, and Posterior calf region moderate depletion  Fluid Retention:  Trace 1+    Malnutrition Diagnosis: Severe malnutrition  In Context of:  Acute illness or injury on Chronic illness or disease     REASON FOR ASSESSMENT  Julisa Chaney is a 78 year old female seen by Registered Dietitian for - Request RD assessment: low BMI, frail      PMH of: chronic pain, seizures, who has had diarrhea for months, comes in with dehydration, concern for hypotension, possible sepsis     NUTRITION HISTORY  - Information obtained from chart review and pt at bedside  - Julisa confirmed the persistent diarrhea PTA, although she reported her po intake didn't reduce, saying \"she just can't keep anything in her\"  - Patient on a regular diet at home and a typical day of food/fluid intake is 2 meals consisting of -   Breakfast: Cream of wheat and scrambled eggs   Lunch: skips     Dinner: Frozen TV dinner  - Use of oral supplements: None PTA and Pt was eager to receive supplements TID with meals, flavors as follows:    - PRN as Pt was NPO vs CLD    - If Ensure Clear Apple or Berry OK   - If Gel+ Cherry (hates Pineapple and SF Gel+ flavors ok if out of Cherry)   - If Ensure Enlive Staw or Van (no [place])  - IF Magic Cup Berry (Vanilla OK if out, " "but not [place])      CURRENT NUTRITION ORDERS  Diet Order:   NPO     Current Intake/Tolerance:    - Flowsheets show a poor appetite yesterday and x3 intakes of 25%.   - [employer] (meal ordering system) shows pt ordered 1 meal yesterday that contained 947 kcal and 28 g protein.    GI: BM x2 today    NUTRITION FOCUSED PHYSICAL ASSESSMENT FOR DIAGNOSING MALNUTRITION)  Yes          Observed:    Muscle wasting (refer to documentation in Malnutrition section) and Subcutaneous fat loss (refer to documentation in Malnutrition section)    Obtained from Chart/Interdisciplinary Team:  Edema Trace 1+    ANTHROPOMETRICS  Height: 5' 6\"  Weight: 112 lbs 10.48 oz (51.1 kg)  Body mass index is 18.18 kg/m .  Weight Status:  Underweight BMI <18.5  IBW: 130 lb (59.1 kg)  % IBW: 86%  Weight History: -4.1 kg (8%) in 3 months   Wt Readings from Last 10 Encounters:   01/18/24 51.1 kg (112 lb 10.5 oz)   11/10/23 49.6 kg (109 lb 6.4 oz)   11/06/23 49.6 kg (109 lb 6.4 oz)   11/02/23 50.1 kg (110 lb 6.4 oz)   10/27/23 54.9 kg (121 lb)   10/25/23 55.2 kg (121 lb 11.1 oz)   10/24/23 55.2 kg (121 lb 11.1 oz)   10/16/23 50.3 kg (111 lb)   08/24/23 49.9 kg (110 lb)   07/21/23 49.1 kg (108 lb 4.8 oz)      MEDICATIONS   ceFEPIme  1 g Intravenous Q12H    gabapentin  100 mg Oral Daily    gabapentin  300 mg Oral At Bedtime    levETIRAcetam  750 mg Oral BID    [START ON 1/19/2024] magnesium citrate  296 mL Oral Once    meclizine  12.5 mg Oral BID    mirtazapine  15 mg Oral At Bedtime    nortriptyline  50 mg Oral At Bedtime    OXcarbazepine  450 mg Oral Daily    pantoprazole  40 mg Oral BID AC    polyethylene glycol  4,000 mL Oral Once    potassium chloride  10 mEq Intravenous Q1H    potassium chloride  10 mEq Intravenous Once    QUEtiapine  50 mg Oral At Bedtime    sodium chloride (PF)  3 mL Intracatheter Q8H    topiramate  50 mg Oral At Bedtime    vancomycin  1,250 mg Intravenous Q24H       sodium chloride 100 mL/hr at 01/18/24 1700    "     LABS  Electrolytes  Sodium (mmol/L)   Date Value   01/18/2024 144     Potassium (mmol/L)   Date Value   01/18/2024 2.9 (L)   10/10/2022 3.5     Magnesium (mg/dL)   Date Value   01/18/2024 1.6 (L)     Phosphorus (mg/dL)   Date Value   01/17/2024 3.5    Blood Glucose  Glucose (mg/dL)   Date Value   01/18/2024 83   10/10/2022 61 (L)     Hemoglobin A1C (%)   Date Value   07/19/2022 5.6    Renal  Urea Nitrogen (mg/dL)   Date Value   01/18/2024 7.8 (L)   10/10/2022 7     Creatinine (mg/dL)   Date Value   01/18/2024 0.62         ASSESSED NUTRITION NEEDS PER APPROVED PRACTICE GUIDELINES:  Dosing Weight 51.1 kg (actual)  Estimated Energy Needs: 8139-8059 kcals (30-35 Kcal/Kg)  Justification: underweight  Estimated Protein Needs: 61-77 grams protein (1.2-1.5 g pro/Kg)  Justification: Repletion  Estimated Fluid Needs: (1 mL/Kcal)  Justification: per provider pending fluid status    MALNUTRITION:  % Weight Loss:  > 7.5% in 3 months (severe malnutrition)  % Intake:  Decreased intake does not meet criteria for malnutrition  Subcutaneous Fat Loss:  Orbital region moderate depletion and Upper arm region mild depletion  Muscle Loss:  Temporal region moderate depletion, Clavicle bone region moderate depletion, Upper arm region moderate depletion, Dorsal hand region severe depletion, and Posterior calf region moderate depletion  Fluid Retention:  Trace 1+    Malnutrition Diagnosis: Severe malnutrition  In Context of:  Acute illness or injury on Chronic illness or disease    NUTRITION DIAGNOSIS:  Malnutrition related to persistent diarrhea as evidenced by 8% weight loss in 3 months, mild fat loss, and moderate to severe muscle wasting    NUTRITION INTERVENTIONS  Recommendations / Nutrition Prescription  Multivitamin with minerals  Ensure Clear Apple or Berry OK  Gel+ Cherry (hates Pineapple and SF Gel+ flavors ok if out of Cherry)     Implementation  Medical Food Supplement and Multivitamin/Mineral    Nutrition Goals  PO intake -  Meet estimated needs  Weight - Maintain    MONITORING AND EVALUATION:  Progress towards goals will be monitored and evaluated per protocol and Practice Guidelines    Rosendo Segura RD, LD

## 2024-01-18 NOTE — TELEPHONE ENCOUNTER
Will wait on if she need meclizine as this med very high risk for fall and looks like her daughter sending the message as pt is admitted in the hospital  Will follow after discharge     Mara Murillo MD

## 2024-01-18 NOTE — PROGRESS NOTES
Care Management Follow Up    Length of Stay (days): 1    Expected Discharge Date: 01/19/2024     Concerns to be Addressed: discharge planning     Patient plan of care discussed at interdisciplinary rounds: Yes    Anticipated Discharge Disposition:  [facility]     Anticipated Discharge Services:  RN PT OT  Anticipated Discharge DME:  emilie    Patient/family educated on Medicare website which has current facility and service quality ratings:  yes  Education Provided on the Discharge Plan:  yes  Patient/Family in Agreement with the Plan:  yes    Referrals Placed by CM/SW:    Private pay costs discussed: Not applicable    Additional Information:  Chart reviewed.  Patient accepted at [facility] TCU- confirmed with admissions liaison Hafsa.  [facility] auth submitted.     Yue Sanchez, FERMINSW

## 2024-01-18 NOTE — PROGRESS NOTES
"OT Tess     01/18/24 0900   Appointment Info   Signing Clinician's Name / Credentials (OT) Do Ledezma, OTR/L   Living Environment   People in Home alone   Current Living Arrangements house   Home Accessibility no concerns  (Home 1 level, has w/c ramp to enter.)   Living Environment Comments 1 level house w/ wheelchair ramp, grab bars, other accessibility features.   Self-Care   Equipment Currently Used at Home walker, rolling;wheelchair, manual;grab bar, toilet;grab bar, tub/shower;shower chair;raised toilet seat   Activity/Exercise/Self-Care Comment Pt reported being IND w/ toileting at BL and w/ most dressing tasks (sometimes needing help w/ overhead clothing ie. sweatshirts), having A w/ meal prep, meds, and IADLs.   Instrumental Activities of Daily Living (IADL)   IADL Comments Daughter helps pt w/ grocery shopping, errands, cooking. Currently (since pt has been having symptoms) helping more with toileting, meds   General Information   Onset of Illness/Injury or Date of Surgery 01/17/24   Referring Physician Laxmi Paniagua MD   Additional Occupational Profile Info/Pertinent History of Current Problem Julisa Chaney is a 78 year old female admitted on 1/17/2024. She has a hx of chronic pain, seizures, who has had diarrhea for months, comes in with dehydration, concern for hypotension, possible sepsis   Cognitive Status Examination   Orientation Status orientation to person, place and time   Posture   Posture forward head position;protracted shoulders;kyphosis   Range of Motion Comprehensive   General Range of Motion other (see comments)  (Pt has some difficulty bringing arms overhead (slightly decreased ROM), pt describes LUE as \"moving on its own\"; has a slight tremor.)   Strength Comprehensive (MMT)   General Manual Muscle Testing (MMT) Assessment no strength deficits identified  (General weakness within BUEs, pt still able to grab/hold surfaces for transfers and use BUEs functionally.)   Comment, " General Manual Muscle Testing (MMT) Assessment general weakness throughout body   Muscle Tone Assessment   Muscle Tone Quick Adds No deficits were identified   Coordination   Upper Extremity Coordination No deficits were identified   Bed Mobility   Bed Mobility supine-sit   Supine-Sit [place] (Bed Mobility) moderate assist (50% patient effort)   Assistive Device (Bed Mobility) bed rails   Comment (Bed Mobility) Pt able to sit up in bed w/ SBA, able to initiate sitting EOB, needed Mod-Max A to scoot forward towards EOB / adjust hips   Transfers   Transfers sit-stand transfer;toilet transfer   Sit-Stand Transfer   Sit-Stand [place] (Transfers) maximum assist (25% patient effort);2 person assist   Toilet Transfer   Type (Toilet Transfer) stand pivot/stand step   [place] Level (Toilet Transfer) maximum assist (25% patient effort);2 person assist   Assistive Device (Toilet Transfer) commode chair   Balance   Balance Assessment standing static balance   Standing Balance: Static maximum assist;2-person assist   Balance Comments Pt needing cues for posture, to look forward   Activities of Daily Living   BADL Assessment/Intervention toileting;lower body dressing   Lower Body Dressing Assessment/Training   Position (Lower Body Dressing) supine   [place] Level (Lower Body Dressing) don;shoes/slippers;dependent (less than 25% patient effort)   Toileting   Assistive Devices (Toileting) commode chair   [place] Level (Toileting) dependent (less than 25% patient effort)   Clinical Impression   Criteria for Skilled Therapeutic Interventions Met (OT) Yes, treatment indicated   OT Diagnosis Impairments in ADL, transfers, mobility, activity tolerance   Influenced by the following impairments Impairments in strength, activity tolerance   OT Problem List-Impairments impacting ADL problems related to;activity tolerance impaired;balance;mobility;strength   Assessment of Occupational Performance 3-5 Performance  Deficits   Identified Performance Deficits toileting, dressing, transfers, mobility   Planned Therapy Interventions (OT) ADL retraining;bed mobility training;strengthening;transfer training   Clinical Decision Making Complexity (OT) detailed assessment/moderate complexity   Risk & Benefits of therapy have been explained care plan/treatment goals reviewed;evaluation/treatment results reviewed   OT Total Evaluation Time   OT Eval, Moderate Complexity Minutes (01472) 30   OT Goals   Therapy Frequency (OT) Daily   OT Predicted Duration/Target Date for Goal Attainment 01/25/24   OT Goals Lower Body Dressing;Bed Mobility;Transfers;Toilet Transfer/Toileting   OT: Lower Body Dressing Moderate assist   OT: Bed Mobility Modified independent   OT: Transfer Moderate assist   OT: Toilet Transfer/Toileting Moderate assist   OT Discharge Planning   OT Plan Transfers to commode/chair, LB dressing, seated EOB h/g   OT Discharge Recommendation (DC Rec) Transitional Care Facility   OT Rationale for DC Rec Pt currently Max Ax2, would not be safe to return home as pt's daughter doesn't live with pt. Pt would benefit from time in TCU to continue therapy that will increase pt's IND.   OT Brief overview of current status Max Ax2 STS, pivot transfers, Mod Ax1 bed mobility   Total Session Time   Total Session Time (sum of timed and untimed services) 30

## 2024-01-18 NOTE — PROGRESS NOTES
"   01/18/24 1100   Appointment Info   Signing Clinician's Name / Credentials (PT) Enma Brown, PT, DPT   Living Environment   People in Home alone   Current Living Arrangements house   Home Accessibility no concerns  (Ramped entrance)   Transportation Anticipated family or friend will provide   Living Environment Comments All needs met on one level.   Self-Care   Usual Activity Tolerance fair   Current Activity Tolerance poor   Equipment Currently Used at Home walker, rolling;wheelchair, manual   Fall history within last six months yes   Number of times patient has fallen within last six months 1   Activity/Exercise/Self-Care Comment Patient is independent with transfers at baseline.   General Information   Onset of Illness/Injury or Date of Surgery 01/17/24   Referring Physician Laxmi Paniagua MD   Patient/Family Therapy Goals Statement (PT) Get stronger   Pertinent History of Current Problem (include personal factors and/or comorbidities that impact the POC) Per chart, \"Julisa Chaney is a 78 year old female admitted on 1/17/2024. She has a hx of chronic pain, seizures, who has had diarrhea for months, comes in with dehydration, concern for hypotension, possible sepsis.\"   Existing Precautions/Restrictions fall   Posture    Posture Forward head position   Range of Motion (ROM)   Range of Motion ROM deficits secondary to weakness   Strength (Manual Muscle Testing)   Strength (Manual Muscle Testing) Deficits observed during functional mobility   Bed Mobility   Comment, (Bed Mobility) Patient encountered in recliner and returned to recliner at end of session.   Transfers   Transfers sit-stand transfer   Transfer Safety Concerns Noted losing balance backward   Impairments Contributing to Impaired Transfers decreased strength   Sit-Stand Transfer   Sit-Stand [place] (Transfers) moderate assist (50% patient effort);1 person assist;verbal cues   Assistive Device (Sit-Stand Transfers) walker, front-wheeled "   Gait/Stairs (Locomotion)   [place] Level (Gait) unable to assess   Comment, (Gait/Stairs) Unable to assess gait due to weakness.   Clinical Impression   Criteria for Skilled Therapeutic Intervention Yes, treatment indicated   PT Diagnosis (PT) Impaired functional mobility   Influenced by the following impairments Fatigue, weakness   Functional limitations due to impairments Transfers, gait   Clinical Presentation (PT Evaluation Complexity) stable   Clinical Presentation Rationale Patient presents as medically diagnosed.   Clinical Decision Making (Complexity) low complexity   Planned Therapy Interventions (PT) balance training;gait training;home exercise program;patient/family education;ROM (range of motion);strengthening;stretching;transfer training;home program guidelines   Risk & Benefits of therapy have been explained evaluation/treatment results reviewed;care plan/treatment goals reviewed;patient   PT Total Evaluation Time   PT Eval, Low Complexity Minutes (78950) 15   Physical Therapy Goals   PT Frequency 5x/week   PT Predicted Duration/Target Date for Goal Attainment 01/25/24   PT Goals Transfers;Gait   PT: Transfers Supervision/stand-by assist;Sit to/from stand;Bed to/from chair;Assistive device   PT: Gait Supervision/stand-by assist;Assistive device;10 feet   Interventions   Interventions Quick Adds Therapeutic Activity   Therapeutic Activity   Therapeutic Activities: dynamic activities to improve functional performance Minutes (53413) 8   Symptoms Noted During/After Treatment Fatigue   Treatment Detail/Skilled Intervention Facilitated additional sit<>stands from recliner with Mod A and FWW. Verbal cues for hip/trunk extension with minimal improvement. Assisted patient with donning pants in sitting/standing.   PT Discharge Planning   PT Plan Sit<>stand transfers, pivot transfers with assist of 2, progress to short distance ambulation with chair follow   PT Discharge Recommendation (DC Rec)  Transitional Care Facility   PT Rationale for DC Rec Patient currently requires assist of 1-2 for cares and mobility. Recommend TCU to improve independence and strength with functional mobility.   PT Brief overview of current status Mod A of 1 for sit<>stand transfers from recliner.   PT Equipment Needed at Discharge walker, rolling;wheelchair;wheelchair cushion   Total Session Time   Timed Code Treatment Minutes 8   Total Session Time (sum of timed and untimed services) 23

## 2024-01-18 NOTE — PROGRESS NOTES
[facility]    Medicine Progress Note - Hospitalist Service    Date of Admission:  1/17/2024    Assessment & Plan      Julisa Chaney is a 78 year old female admitted on 1/17/2024. She has a hx of chronic pain, seizures, who has had diarrhea for months, comes in with dehydration, concern for hypotension, possible sepsis    1.Hypotension, weakness  -concern sepsis, vs dehydration, hypokalemia  -I am more concerned now about ongoing dehydration, hypokalemia, from severe GI losses  -ivf   -in ER got Cef + vanco to cover sepsis  -check stool cx, neg for c diff  -tele  -check cortisol-ok  -check procal-normal   -check blood cx-so far NTD    2.Diarrhea  -for months, 7 times/24 hours at home  -was to see GI in clinic 2/20  -appreciate GI seeing here, sent of Celiac studies, and now plan on colonoscopy here  -send stools cx  -ivf  -no sign of bleeding  -?microscopic colitis , with prior norovirus just before this onset , also on ppi    3.Hypokalemia-severe, K back down again this am  -refused po k  -with high volume stools, iv replacement  -tele  -watch mag close  -checked phos    4.Hypernatremia, 146 then improved, but back up this am  -suspect due to dehydration  -she is able to take po-was drinking water  -IVF now NS  -replete volume  -getting free water with K iv and iv mag  -trend NA q 6 hours    5.Hx of Sz  -on meds    6.hx of gerd  -on ppi    7.Failure to thrive--from ongoing stool issues  -lives alone  -will need Pt/OT eval--now will likely need TCU    8.Hypomag  -iv mag    9.DVT prevent- scd    10.code-full      I called daughter at 1333 and she knows she will get colonoscopy here tomorrow                Diet: Combination Diet Regular Diet Adult    DVT Prophylaxis: Pneumatic Compression Devices  Abrams Catheter: Not present  Lines: None     Cardiac Monitoring: None  Code Status: Full Code      Clinically Significant Risk Factors Present on Admission        # Hypokalemia: Lowest K = 2.8  mmol/L in last 2 days, will replace as needed  # Hypernatremia: Highest Na = 146 mmol/L in last 2 days, will monitor as appropriate  # Hypocalcemia: Lowest Ca = 8.3 mg/dL in last 2 days, will monitor and replace as appropriate   # Hypomagnesemia: Lowest Mg = 1.6 mg/dL in last 2 days, will replace as needed   # Hypoalbuminemia: Lowest albumin = 3.3 g/dL at 1/17/2024 10:55 AM, will monitor as appropriate                     Disposition Plan     Expected Discharge Date: 01/19/2024      Destination: inpatient rehabilitation facility              Laxmi Paniagua MD  Hospitalist Service  [facility]  Securely message with [employer] (more info)  Text page via [employer] Paging/Directory   ______________________________________________________________________    Interval History   She notes no looses stools this am, but gas pains in lower abd  No n/v  Was able to eat last pm    Physical Exam   Vital Signs: Temp: 98.3  F (36.8  C) Temp src: Oral BP: 134/63 Pulse: 86   Resp: 18 SpO2: 93 % O2 Device: None (Room air)    Weight: 112 lbs 10.48 oz    Constitutional: awake, alert, cooperative, and no apparent distress  Respiratory: no increased work of breathing, good air exchange, no retractions, and diminished breath sounds right base and left base  Cardiovascular: regular rate and rhythm and normal S1 and S2  GI: hypoactive bowel sounds, soft, non-distended, and tenderness noted in the right lower quadrant and in the left lower quadrant  Skin: no bruising or bleeding  Musculoskeletal: no lower extremity pitting edema present  Neurologic: Mental Status Exam:  Level of Alertness:   awake  Neuropsychiatric: General: normal, calm, and normal eye contact    Medical Decision Making       50 MINUTES SPENT BY ME on the date of service doing chart review, history, exam, documentation & further activities per the note.      Data     I have personally reviewed the following data over the past 24 hrs:    6.3  \   12.3   /  427     146 (H); 146 (H) 109 (H) 7.8 (L) /  83   2.9 (L) 28 0.62 \     ALT: 8 AST: 12 AP: 140 TBILI: <0.2   ALB: 3.3 (L) TOT PROTEIN: 7.0 LIPASE: 7 (L)     Procal: 0.05 CRP: N/A Lactic Acid: 0.8         Imaging results reviewed over the past 24 hrs:   Recent Results (from the past 24 hour(s))   XR Chest 1 View    Narrative    EXAM: XR CHEST 1 VIEW  LOCATION: [facility]  DATE: 1/17/2024    INDICATION: FTT weakness  COMPARISON: 08/24/2023.      Impression    IMPRESSION: Low lung volumes with right basilar atelectasis or scarring. Remainder of the lungs are clear. Cardiomediastinal silhouette within normal limits. No large pleural effusion. No significant interval change.   CT Head w/o Contrast    Narrative    EXAM: CT HEAD W/O CONTRAST  LOCATION: [facility]  DATE: 1/17/2024    INDICATION: h o pyogenic brain abscess drained, now with SIRS and fatigue, no AMS  COMPARISON: Head CT 10/22/2023  TECHNIQUE: Routine CT Head without IV contrast. Multiplanar reformats. Dose reduction techniques were used.    FINDINGS:  INTRACRANIAL CONTENTS: No intracranial hemorrhage. Mild diffuse cerebral parenchymal volume loss. No midline shift. The basilar cisterns are patent. Moderate-sized area of encephalomalacia involving the right cerebellar hemisphere. Small encephalomalacia   in the left cerebellar hemisphere. Mild periventricular and scattered foci of deep white matter hypodensities involving both cerebral hemispheres. No CT evidence for an acute infarct.    VISUALIZED ORBITS/SINUSES/MASTOIDS: Prior bilateral cataract surgery. Visualized portions of the orbits are otherwise unremarkable. Small mucous retention cysts in the posterior right ethmoid air cells. Mild nasoseptal bowing to the right anteriorly. No   middle ear or mastoid effusion.    BONES/SOFT TISSUES: No acute abnormality.      Impression    IMPRESSION:  1.  Stable compared to previous head CT 10/22/2023.  2.  No  intracranial hemorrhage, mass lesions, hydrocephalus or CT evidence for an acute infarct.  3.  Moderate-sized area of encephalomalacia involving the right cerebellar hemisphere. Small encephalomalacia in the left cerebellar hemisphere.   4.  Mild diffuse cerebral parenchymal volume loss. Presumed chronic hypertensive/microvascular ischemic white matter changes.   CT Abdomen Pelvis w/o Contrast    Narrative    EXAM: CT ABDOMEN PELVIS W/O CONTRAST  LOCATION: [facility]  DATE: 1/17/2024    INDICATION: chronic ongoing diarrhea now with SIRS  COMPARISON: CT abdomen pelvis 07/19/2022  TECHNIQUE: CT scan of the abdomen and pelvis was performed without IV contrast. Multiplanar reformats were obtained. Dose reduction techniques were used.  CONTRAST: None.    FINDINGS:   LOWER CHEST: Dependent atelectasis in the right middle and lower lobe.    HEPATOBILIARY: Normal unenhanced liver contours. Gallbladder is present.    PANCREAS: Unremarkable.    SPLEEN: Unremarkable.    ADRENAL GLANDS: Unremarkable.    KIDNEYS/BLADDER: Normal unenhanced renal contours. No urolithiasis or hydronephrosis. Normal bladder contours.    BOWEL: No bowel obstruction. Liquid stool throughout the colon. No evidence of toxic megacolon. No free fluid or free air.    LYMPH NODES: No lymphadenopathy.    VASCULATURE: Abdominal aorta is nonaneurysmal with severe atheromatous disease.    PELVIC ORGANS: Hysterectomy.    MUSCULOSKELETAL: No aggressive osseous lesions.      Impression    IMPRESSION:   1.  Liquid stool throughout the colon, nonspecific but can be seen in setting of enteritis/colitis.  2.  Additional details in the findings.

## 2024-01-18 NOTE — PLAN OF CARE
Problem: Adult Inpatient Plan of Care  Goal: Plan of Care Review  Description: The Plan of Care Review/Shift note should be completed every shift.  The Outcome Evaluation is a brief statement about your assessment that the patient is improving, declining, or no change.  This information will be displayed automatically on your shift  note.  Outcome: Progressing   Goal Outcome Evaluation:       Pt is alert and oriented x 4, pt still appears to be tired and weak.  Complained of headache, Q 6 hours Tylenol with codeine was given. Pt had BM x 2,. BP is now loose and not watery. Pt was negative for c-diff. BP of 102/59,  113/57, 138/64, Will continue to monitor pt. Assist of two, Capable of calling for help. Coccyx area is red and blanchable. Had tylenol 3 this morning for back pain. Needs to be encouraged to reposition self.

## 2024-01-19 ENCOUNTER — ANESTHESIA (OUTPATIENT)
Dept: SURGERY | Facility: HOSPITAL | Age: 79
DRG: 391 | End: 2024-01-19
Payer: COMMERCIAL

## 2024-01-19 LAB
ANION GAP SERPL CALCULATED.3IONS-SCNC: 12 MMOL/L (ref 7–15)
BUN SERPL-MCNC: 3.2 MG/DL (ref 8–23)
CALCIUM SERPL-MCNC: 8.1 MG/DL (ref 8.8–10.2)
CHLORIDE SERPL-SCNC: 107 MMOL/L (ref 98–107)
COLONOSCOPY: NORMAL
CREAT SERPL-MCNC: 0.54 MG/DL (ref 0.51–0.95)
DEPRECATED HCO3 PLAS-SCNC: 26 MMOL/L (ref 22–29)
EGFRCR SERPLBLD CKD-EPI 2021: >90 ML/MIN/1.73M2
ERYTHROCYTE [DISTWIDTH] IN BLOOD BY AUTOMATED COUNT: 15.2 % (ref 10–15)
GLUCOSE SERPL-MCNC: 94 MG/DL (ref 70–99)
HCT VFR BLD AUTO: 43 % (ref 35–47)
HGB BLD-MCNC: 12.7 G/DL (ref 11.7–15.7)
MAGNESIUM SERPL-MCNC: 2.3 MG/DL (ref 1.7–2.3)
MCH RBC QN AUTO: 25.8 PG (ref 26.5–33)
MCHC RBC AUTO-ENTMCNC: 29.5 G/DL (ref 31.5–36.5)
MCV RBC AUTO: 87 FL (ref 78–100)
PLATELET # BLD AUTO: 476 10E3/UL (ref 150–450)
POTASSIUM SERPL-SCNC: 3.8 MMOL/L (ref 3.4–5.3)
POTASSIUM SERPL-SCNC: 3.8 MMOL/L (ref 3.4–5.3)
RBC # BLD AUTO: 4.92 10E6/UL (ref 3.8–5.2)
SODIUM SERPL-SCNC: 145 MMOL/L (ref 135–145)
VANCOMYCIN SERPL-MCNC: 11.7 UG/ML
WBC # BLD AUTO: 6 10E3/UL (ref 4–11)

## 2024-01-19 PROCEDURE — 360N000075 HC SURGERY LEVEL 2, PER MIN: Performed by: INTERNAL MEDICINE

## 2024-01-19 PROCEDURE — 99232 SBSQ HOSP IP/OBS MODERATE 35: CPT | Performed by: INTERNAL MEDICINE

## 2024-01-19 PROCEDURE — 80202 ASSAY OF VANCOMYCIN: CPT | Performed by: INTERNAL MEDICINE

## 2024-01-19 PROCEDURE — 210N000001 HC R&B IMCU HEART CARE

## 2024-01-19 PROCEDURE — 80048 BASIC METABOLIC PNL TOTAL CA: CPT | Performed by: INTERNAL MEDICINE

## 2024-01-19 PROCEDURE — 36415 COLL VENOUS BLD VENIPUNCTURE: CPT | Performed by: INTERNAL MEDICINE

## 2024-01-19 PROCEDURE — 88305 TISSUE EXAM BY PATHOLOGIST: CPT | Mod: TC | Performed by: INTERNAL MEDICINE

## 2024-01-19 PROCEDURE — 999N000141 HC STATISTIC PRE-PROCEDURE NURSING ASSESSMENT: Performed by: INTERNAL MEDICINE

## 2024-01-19 PROCEDURE — 250N000013 HC RX MED GY IP 250 OP 250 PS 637: Performed by: PHYSICIAN ASSISTANT

## 2024-01-19 PROCEDURE — 250N000013 HC RX MED GY IP 250 OP 250 PS 637: Performed by: INTERNAL MEDICINE

## 2024-01-19 PROCEDURE — 258N000003 HC RX IP 258 OP 636: Performed by: INTERNAL MEDICINE

## 2024-01-19 PROCEDURE — 258N000003 HC RX IP 258 OP 636: Performed by: ANESTHESIOLOGY

## 2024-01-19 PROCEDURE — 83735 ASSAY OF MAGNESIUM: CPT | Performed by: INTERNAL MEDICINE

## 2024-01-19 PROCEDURE — 250N000011 HC RX IP 250 OP 636: Performed by: NURSE ANESTHETIST, CERTIFIED REGISTERED

## 2024-01-19 PROCEDURE — 272N000001 HC OR GENERAL SUPPLY STERILE: Performed by: INTERNAL MEDICINE

## 2024-01-19 PROCEDURE — 87177 OVA AND PARASITES SMEARS: CPT | Performed by: INTERNAL MEDICINE

## 2024-01-19 PROCEDURE — 258N000003 HC RX IP 258 OP 636: Performed by: NURSE ANESTHETIST, CERTIFIED REGISTERED

## 2024-01-19 PROCEDURE — 85014 HEMATOCRIT: CPT | Performed by: INTERNAL MEDICINE

## 2024-01-19 PROCEDURE — 370N000017 HC ANESTHESIA TECHNICAL FEE, PER MIN: Performed by: INTERNAL MEDICINE

## 2024-01-19 PROCEDURE — 0DBE8ZX EXCISION OF LARGE INTESTINE, VIA NATURAL OR ARTIFICIAL OPENING ENDOSCOPIC, DIAGNOSTIC: ICD-10-PCS | Performed by: INTERNAL MEDICINE

## 2024-01-19 PROCEDURE — 250N000011 HC RX IP 250 OP 636: Performed by: INTERNAL MEDICINE

## 2024-01-19 RX ORDER — NALOXONE HYDROCHLORIDE 0.4 MG/ML
0.2 INJECTION, SOLUTION INTRAMUSCULAR; INTRAVENOUS; SUBCUTANEOUS
Status: DISCONTINUED | OUTPATIENT
Start: 2024-01-19 | End: 2024-01-19 | Stop reason: HOSPADM

## 2024-01-19 RX ORDER — LOPERAMIDE HCL 2 MG
2 CAPSULE ORAL 4 TIMES DAILY PRN
Status: DISCONTINUED | OUTPATIENT
Start: 2024-01-19 | End: 2024-01-24 | Stop reason: HOSPADM

## 2024-01-19 RX ORDER — SIMETHICONE 40MG/0.6ML
133 SUSPENSION, DROPS(FINAL DOSAGE FORM)(ML) ORAL
Status: DISCONTINUED | OUTPATIENT
Start: 2024-01-19 | End: 2024-01-19 | Stop reason: HOSPADM

## 2024-01-19 RX ORDER — POTASSIUM CHLORIDE 7.45 MG/ML
10 INJECTION INTRAVENOUS
Status: COMPLETED | OUTPATIENT
Start: 2024-01-19 | End: 2024-01-19

## 2024-01-19 RX ORDER — SODIUM CHLORIDE, SODIUM LACTATE, POTASSIUM CHLORIDE, CALCIUM CHLORIDE 600; 310; 30; 20 MG/100ML; MG/100ML; MG/100ML; MG/100ML
INJECTION, SOLUTION INTRAVENOUS CONTINUOUS PRN
Status: DISCONTINUED | OUTPATIENT
Start: 2024-01-19 | End: 2024-01-19

## 2024-01-19 RX ORDER — PROPOFOL 10 MG/ML
INJECTION, EMULSION INTRAVENOUS CONTINUOUS PRN
Status: DISCONTINUED | OUTPATIENT
Start: 2024-01-19 | End: 2024-01-19

## 2024-01-19 RX ORDER — ONDANSETRON 4 MG/1
4 TABLET, ORALLY DISINTEGRATING ORAL EVERY 30 MIN PRN
Status: CANCELLED | OUTPATIENT
Start: 2024-01-19

## 2024-01-19 RX ORDER — NALOXONE HYDROCHLORIDE 0.4 MG/ML
0.4 INJECTION, SOLUTION INTRAMUSCULAR; INTRAVENOUS; SUBCUTANEOUS
Status: DISCONTINUED | OUTPATIENT
Start: 2024-01-19 | End: 2024-01-19 | Stop reason: HOSPADM

## 2024-01-19 RX ORDER — FLUMAZENIL 0.1 MG/ML
0.2 INJECTION, SOLUTION INTRAVENOUS
Status: DISCONTINUED | OUTPATIENT
Start: 2024-01-19 | End: 2024-01-19 | Stop reason: HOSPADM

## 2024-01-19 RX ORDER — HYDROXYZINE HYDROCHLORIDE 10 MG/1
10 TABLET, FILM COATED ORAL 3 TIMES DAILY PRN
Status: DISCONTINUED | OUTPATIENT
Start: 2024-01-19 | End: 2024-01-24 | Stop reason: HOSPADM

## 2024-01-19 RX ORDER — ATROPINE SULFATE 0.1 MG/ML
1 INJECTION INTRAVENOUS
Status: DISCONTINUED | OUTPATIENT
Start: 2024-01-19 | End: 2024-01-19 | Stop reason: HOSPADM

## 2024-01-19 RX ORDER — PROPOFOL 10 MG/ML
INJECTION, EMULSION INTRAVENOUS PRN
Status: DISCONTINUED | OUTPATIENT
Start: 2024-01-19 | End: 2024-01-19

## 2024-01-19 RX ORDER — LIDOCAINE 40 MG/G
CREAM TOPICAL
Status: DISCONTINUED | OUTPATIENT
Start: 2024-01-19 | End: 2024-01-19 | Stop reason: HOSPADM

## 2024-01-19 RX ORDER — FENTANYL CITRATE 50 UG/ML
50-100 INJECTION, SOLUTION INTRAMUSCULAR; INTRAVENOUS EVERY 5 MIN PRN
Status: DISCONTINUED | OUTPATIENT
Start: 2024-01-19 | End: 2024-01-19 | Stop reason: HOSPADM

## 2024-01-19 RX ORDER — ONDANSETRON 2 MG/ML
INJECTION INTRAMUSCULAR; INTRAVENOUS PRN
Status: DISCONTINUED | OUTPATIENT
Start: 2024-01-19 | End: 2024-01-19

## 2024-01-19 RX ORDER — OXYCODONE HYDROCHLORIDE 5 MG/1
10 TABLET ORAL
Status: CANCELLED | OUTPATIENT
Start: 2024-01-19

## 2024-01-19 RX ORDER — OXYCODONE HYDROCHLORIDE 5 MG/1
5 TABLET ORAL
Status: CANCELLED | OUTPATIENT
Start: 2024-01-19

## 2024-01-19 RX ORDER — EPINEPHRINE 1 MG/ML
0.1 INJECTION, SOLUTION INTRAMUSCULAR; SUBCUTANEOUS
Status: DISCONTINUED | OUTPATIENT
Start: 2024-01-19 | End: 2024-01-19 | Stop reason: HOSPADM

## 2024-01-19 RX ORDER — SODIUM CHLORIDE, SODIUM LACTATE, POTASSIUM CHLORIDE, CALCIUM CHLORIDE 600; 310; 30; 20 MG/100ML; MG/100ML; MG/100ML; MG/100ML
INJECTION, SOLUTION INTRAVENOUS CONTINUOUS
Status: DISCONTINUED | OUTPATIENT
Start: 2024-01-19 | End: 2024-01-19 | Stop reason: HOSPADM

## 2024-01-19 RX ORDER — DIPHENHYDRAMINE HYDROCHLORIDE 50 MG/ML
25-50 INJECTION INTRAMUSCULAR; INTRAVENOUS
Status: DISCONTINUED | OUTPATIENT
Start: 2024-01-19 | End: 2024-01-19 | Stop reason: HOSPADM

## 2024-01-19 RX ORDER — ONDANSETRON 2 MG/ML
4 INJECTION INTRAMUSCULAR; INTRAVENOUS EVERY 30 MIN PRN
Status: CANCELLED | OUTPATIENT
Start: 2024-01-19

## 2024-01-19 RX ADMIN — CEFEPIME HYDROCHLORIDE 1 G: 1 INJECTION, POWDER, FOR SOLUTION INTRAMUSCULAR; INTRAVENOUS at 11:39

## 2024-01-19 RX ADMIN — LEVETIRACETAM 750 MG: 500 TABLET, FILM COATED ORAL at 10:20

## 2024-01-19 RX ADMIN — VANCOMYCIN HYDROCHLORIDE 1500 MG: 5 INJECTION, POWDER, LYOPHILIZED, FOR SOLUTION INTRAVENOUS at 12:31

## 2024-01-19 RX ADMIN — ONDANSETRON 4 MG: 2 INJECTION INTRAMUSCULAR; INTRAVENOUS at 09:22

## 2024-01-19 RX ADMIN — SODIUM CHLORIDE, POTASSIUM CHLORIDE, SODIUM LACTATE AND CALCIUM CHLORIDE: 600; 310; 30; 20 INJECTION, SOLUTION INTRAVENOUS at 09:09

## 2024-01-19 RX ADMIN — POTASSIUM CHLORIDE 10 MEQ: 7.46 INJECTION, SOLUTION INTRAVENOUS at 01:51

## 2024-01-19 RX ADMIN — SODIUM CHLORIDE: 9 INJECTION, SOLUTION INTRAVENOUS at 01:51

## 2024-01-19 RX ADMIN — ACETAMINOPHEN AND CODEINE PHOSPHATE 1 TABLET: 300; 30 TABLET ORAL at 17:48

## 2024-01-19 RX ADMIN — MIRTAZAPINE 15 MG: 15 TABLET, FILM COATED ORAL at 22:26

## 2024-01-19 RX ADMIN — SODIUM CHLORIDE, POTASSIUM CHLORIDE, SODIUM LACTATE AND CALCIUM CHLORIDE: 600; 310; 30; 20 INJECTION, SOLUTION INTRAVENOUS at 08:28

## 2024-01-19 RX ADMIN — MECLIZINE 12.5 MG: 12.5 TABLET ORAL at 19:45

## 2024-01-19 RX ADMIN — LEVETIRACETAM 750 MG: 500 TABLET, FILM COATED ORAL at 19:46

## 2024-01-19 RX ADMIN — CEFEPIME HYDROCHLORIDE 1 G: 1 INJECTION, POWDER, FOR SOLUTION INTRAMUSCULAR; INTRAVENOUS at 22:27

## 2024-01-19 RX ADMIN — MECLIZINE 12.5 MG: 12.5 TABLET ORAL at 10:21

## 2024-01-19 RX ADMIN — POTASSIUM CHLORIDE 10 MEQ: 7.46 INJECTION, SOLUTION INTRAVENOUS at 01:02

## 2024-01-19 RX ADMIN — OXCARBAZEPINE 450 MG: 150 TABLET, FILM COATED ORAL at 10:21

## 2024-01-19 RX ADMIN — HYDROXYZINE HYDROCHLORIDE 10 MG: 10 TABLET ORAL at 16:12

## 2024-01-19 RX ADMIN — POTASSIUM CHLORIDE 10 MEQ: 7.46 INJECTION, SOLUTION INTRAVENOUS at 04:06

## 2024-01-19 RX ADMIN — QUETIAPINE FUMARATE 50 MG: 25 TABLET ORAL at 22:25

## 2024-01-19 RX ADMIN — BE HEALTH MAGNESIUM CITRATE ORAL SOLUTION - CHERRY 296 ML: 1.75 LIQUID ORAL at 04:06

## 2024-01-19 RX ADMIN — PANTOPRAZOLE SODIUM 40 MG: 40 TABLET, DELAYED RELEASE ORAL at 16:12

## 2024-01-19 RX ADMIN — GABAPENTIN 100 MG: 100 CAPSULE ORAL at 10:20

## 2024-01-19 RX ADMIN — NORTRIPTYLINE HYDROCHLORIDE 50 MG: 50 CAPSULE ORAL at 22:26

## 2024-01-19 RX ADMIN — TOPIRAMATE 50 MG: 25 TABLET, FILM COATED ORAL at 22:26

## 2024-01-19 RX ADMIN — POTASSIUM CHLORIDE 10 MEQ: 7.46 INJECTION, SOLUTION INTRAVENOUS at 02:56

## 2024-01-19 RX ADMIN — ACETAMINOPHEN AND CODEINE PHOSPHATE 1 TABLET: 300; 30 TABLET ORAL at 10:19

## 2024-01-19 RX ADMIN — GABAPENTIN 300 MG: 300 CAPSULE ORAL at 22:25

## 2024-01-19 RX ADMIN — SODIUM CHLORIDE: 9 INJECTION, SOLUTION INTRAVENOUS at 14:29

## 2024-01-19 RX ADMIN — PROPOFOL 30 MG: 10 INJECTION, EMULSION INTRAVENOUS at 09:11

## 2024-01-19 RX ADMIN — PROPOFOL 150 MCG/KG/MIN: 10 INJECTION, EMULSION INTRAVENOUS at 09:12

## 2024-01-19 ASSESSMENT — ACTIVITIES OF DAILY LIVING (ADL)
ADLS_ACUITY_SCORE: 49
ADLS_ACUITY_SCORE: 48
ADLS_ACUITY_SCORE: 49
ADLS_ACUITY_SCORE: 48
ADLS_ACUITY_SCORE: 49

## 2024-01-19 ASSESSMENT — ENCOUNTER SYMPTOMS: SEIZURES: 1

## 2024-01-19 NOTE — ANESTHESIA POSTPROCEDURE EVALUATION
Patient: Julisa Chaney    Procedure: Procedure(s):  COLONOSCOPY WITH RANDOM BIOPSIES       Anesthesia Type:  MAC    Note:  Disposition: Outpatient   Postop Pain Control: Uneventful            Sign Out: Well controlled pain   PONV: No   Neuro/Psych: Uneventful            Sign Out: Acceptable/Baseline neuro status   Airway/Respiratory: Uneventful            Sign Out: Acceptable/Baseline resp. status   CV/Hemodynamics: Uneventful            Sign Out: Acceptable CV status; No obvious hypovolemia; No obvious fluid overload   Other NRE: NONE   DID A NON-ROUTINE EVENT OCCUR? No           Last vitals:  Vitals:    01/19/24 0813 01/19/24 1003 01/19/24 1154   BP: (!) 152/70 (!) 158/70 130/66   Pulse: 106 104 89   Resp: 20 14 16   Temp: 36.9  C (98.5  F)  36.9  C (98.5  F)   SpO2: 95% 94% 93%       Electronically Signed By: Perry Prajapati MD  January 19, 2024  2:41 PM

## 2024-01-19 NOTE — PROGRESS NOTES
Called by RN  IV infiltrate with vanco  I saw her arm , RN marked  Small area of erythema there  Soft    Rec stop vanc  Elevate arm    Pt asked for something to help with anxiety  Will try hydroxyzine 10 mg po tid prn

## 2024-01-19 NOTE — PROGRESS NOTES
[facility]    Medicine Progress Note - Hospitalist Service    Date of Admission:  1/17/2024    Assessment & Plan   Julisa Chaney is a 78 year old female admitted on 1/17/2024. She has a hx of chronic pain, seizures, who has had diarrhea for months, comes in with dehydration, concern for hypotension, possible sepsis    1.Hypotension, weakness  -concern sepsis, vs dehydration, hypokalemia  -I am more concerned now about ongoing dehydration, hypokalemia, from severe GI losses  -ivf , has helped bp  -in ER got Cef + vanco to cover sepsis  -check stool cx, neg for c diff  -tele  -check cortisol-ok  -check procal-normal   -check blood cx-so far NTD    2.Diarrhea  -for months, 7 times/24 hours at home  -was to see GI in clinic 2/20  -appreciate GI seeing here, sent of Celiac studies, and now plan on colonoscopy here  -send stools cx  -ivf  -no sign of bleeding  -?microscopic colitis , with prior norovirus just before this onset , also on ppi  -today getting colonoscopy     3.Hypokalemia-severe, K back down again this am  -refused po k  -with high volume stools, iv replacement with k was done, better today   -tele  -watch mag close  -checked phos    4.Hypernatremia, 146 then improved,toady 145  -suspect due to dehydration  -she is able to take po-was drinking water  -IVF   -replete volume  -getting free water with K iv and iv mag  -trend NA q 6 hours    5.Hx of Sz  -on meds    6.hx of gerd  -on ppi    7.Failure to thrive--from ongoing stool issues  -lives alone  -will need Pt/OT eval--now will likely need TCU    8.Hypomag  -iv mag, now today mag good    9.DVT prevent- scd    10.code-full    I called daughter and updated after scope                  Diet: NPO per Anesthesia Guidelines for Procedure/Surgery Except for: Meds  Snacks/Supplements Adult: Other; PRN; With Meals  Snacks/Supplements Adult: Ensure Clear; With Meals  Snacks/Supplements Adult: Gelatein Plus; With Meals    DVT Prophylaxis:  Pneumatic Compression Devices  Abrams Catheter: Not present  Lines: None     Cardiac Monitoring: None  Code Status: Full Code      Clinically Significant Risk Factors        # Hypokalemia: Lowest K = 2.8 mmol/L in last 2 days, will replace as needed  # Hypernatremia: Highest Na = 146 mmol/L in last 2 days, will monitor as appropriate  # Hypocalcemia: Lowest Ca = 8.1 mg/dL in last 2 days, will monitor and replace as appropriate   # Hypomagnesemia: Lowest Mg = 1.6 mg/dL in last 2 days, will replace as needed   # Hypoalbuminemia: Lowest albumin = 3.3 g/dL at 1/17/2024 10:55 AM, will monitor as appropriate             # Severe Malnutrition: based on nutrition assessment, PRESENT ON ADMISSION          Disposition Plan      Expected Discharge Date: 01/20/2024      Destination: inpatient rehabilitation facility              Laxmi Paniagua MD  Hospitalist Service  [facility]  Securely message with [employer] (more info)  Text page via [employer] Paging/Directory   ______________________________________________________________________    Interval History   She did prep and thinks stools got much clearer  No gas pains this am, had some last night with prep  No sob    Physical Exam   Vital Signs: Temp: 98.5  F (36.9  C) Temp src: Oral BP: (!) 152/70 Pulse: 106   Resp: 20 SpO2: 95 % O2 Device: None (Room air)    Weight: 112 lbs 6.95 oz    Constitutional: awake, alert, cooperative, and no apparent distress  Respiratory: no increased work of breathing, good air exchange, no retractions, and clear to auscultation  Cardiovascular: regular rate and rhythm and normal S1 and S2  GI: normal bowel sounds, soft, non-distended, and non-tender  Skin: no bruising or bleeding  Musculoskeletal: no lower extremity pitting edema present  Neurologic: Mental Status Exam:  Level of Alertness:   awake  Neuropsychiatric: General: normal, calm, and normal eye contact    Medical Decision Making       35 MINUTES SPENT BY ME on the  date of service doing chart review, history, exam, documentation & further activities per the note.      Data     I have personally reviewed the following data over the past 24 hrs:    6.0  \   12.7   / 476 (H)     145 107 3.2 (L) /  94   3.8; 3.8 26 0.54 \       Imaging results reviewed over the past 24 hrs:   No results found for this or any previous visit (from the past 24 hour(s)).

## 2024-01-19 NOTE — PROGRESS NOTES
Care Management Follow Up    Length of Stay (days): 2    Expected Discharge Date: 01/20/2024     Concerns to be Addressed: discharge planning     Patient plan of care discussed at interdisciplinary rounds: Yes    Anticipated Discharge Disposition:  [facility]     Anticipated Discharge Services:  Rn PT OT  Anticipated Discharge DME:  emilie    Patient/family educated on Medicare website which has current facility and service quality ratings:  yes  Education Provided on the Discharge Plan:  yes  Patient/Family in Agreement with the Plan:      Referrals Placed by CM/SW:    Private pay costs discussed: Not applicable    Additional Information:  Accepted at [facility] when medically ready. Emily Michele received, copy placed in hard chart.  Daughter Alissa updated by phone and in agreement with discharge plan.    Yue Sanchez, FERMINSW

## 2024-01-19 NOTE — ANESTHESIA PREPROCEDURE EVALUATION
Anesthesia Pre-Procedure Evaluation    Patient: Julisa Chaney   MRN: [id number] : 1945        Procedure : Procedure(s):  COLONOSCOPY          Past Medical History:   Diagnosis Date    Aspiration pneumonia (H) 2022    Depression     High cholesterol     Migraines     Pyloric ulcer, chronic     Sepsis (H) 08/10/2020    Trigeminal neuralgia       Past Surgical History:   Procedure Laterality Date    HYSTERECTOMY      IR GASTROSTOMY TUBE PERCUTANEOUS PLCMNT  2022    PICC TRIPLE LUMEN PLACEMENT  2022         MD ESOPHAGOGASTRODUODENOSCOPY TRANSORAL DIAGNOSTIC N/A 2020    Procedure: ESOPHAGOGASTRODUODENOSCOPY (EGD);  Surgeon: Nathan Smalls MD;  Location: [facility] [place];  Service: Gastroenterology    MD ESOPHAGOGASTRODUODENOSCOPY TRANSORAL DIAGNOSTIC N/A 2020    Procedure: ESOPHAGOGASTRODUODENOSCOPY (EGD), PYLORIC DILATION;  Surgeon: Nathan Smalls MD;  Location: [facility] [place];  Service: Gastroenterology    VENTRICULOSTOMY Left 2022    Procedure: LEFT FRONTAL VENTRICULOSTOMY;  Surgeon: Brady Heredia MD;  Location: [facility] COLONOSCOPY W/WO BRUSH/WASH N/A 2020    Procedure: COLONOSCOPY WITH POLYPECTOMY;  Surgeon: Nathan Smalls MD;  Location: [facility] [place];  Service: Gastroenterology      Allergies   Allergen Reactions    Contrast [Iohexol] Rash     Noticed during 2020 admission. Received IV contrast on     Chocolate Headache    Contrast Dye Rash    Penicillins Rash      Social History     Tobacco Use    Smoking status: Former     Packs/day: 1.00     Years: 50.00     Additional pack years: 0.00     Total pack years: 50.00     Types: Cigarettes     Quit date: 2014     Years since quittin.1    Smokeless tobacco: Never   Substance Use Topics    Alcohol use: No      Wt Readings from Last 1 Encounters:   24 51 kg (112 lb 7 oz)        Anesthesia Evaluation   Pt has had prior anesthetic.         ROS/MED HX  ENT/Pulmonary:  - neg pulmonary  "ROS     Neurologic:     (+)      migraines, seizures,                         Cardiovascular:  - neg cardiovascular ROS     METS/Exercise Tolerance:     Hematologic:     (+)      anemia,          Musculoskeletal:       GI/Hepatic:     (+)        bowel prep,            Renal/Genitourinary:  - neg Renal ROS     Endo:  - neg endo ROS     Psychiatric/Substance Use:     (+) psychiatric history depression  H/O chronic opiod use .     Infectious Disease:     (+)   MRSA,         Malignancy:       Other:      (+)  , H/O Chronic Pain,         Physical Exam    Airway  airway exam normal      Mallampati: II   TM distance: > 3 FB   Neck ROM: full   Mouth opening: > 3 cm    Respiratory Devices and Support         Dental       (+) Modest Abnormalities - crowns, retainers, 1 or 2 missing teeth      Cardiovascular   cardiovascular exam normal       Rhythm and rate: regular and normal     Pulmonary   pulmonary exam normal        breath sounds clear to auscultation           OUTSIDE LABS:  CBC:   Lab Results   Component Value Date    WBC 6.0 01/19/2024    WBC 6.3 01/18/2024    HGB 12.7 01/19/2024    HGB 12.3 01/18/2024    HCT 43.0 01/19/2024    HCT 41.3 01/18/2024     (H) 01/19/2024     01/18/2024     BMP:   Lab Results   Component Value Date     01/19/2024     01/18/2024    POTASSIUM 3.8 01/19/2024    POTASSIUM 3.8 01/19/2024    CHLORIDE 107 01/19/2024    CHLORIDE 109 (H) 01/18/2024    CO2 26 01/19/2024    CO2 28 01/18/2024    BUN 3.2 (L) 01/19/2024    BUN 7.8 (L) 01/18/2024    CR 0.54 01/19/2024    CR 0.62 01/18/2024    GLC 94 01/19/2024    GLC 83 01/18/2024     COAGS:   Lab Results   Component Value Date    PTT 33 07/29/2022    INR 1.34 (H) 07/29/2022     POC: No results found for: \"BGM\", \"HCG\", \"HCGS\"  HEPATIC:   Lab Results   Component Value Date    ALBUMIN 3.3 (L) 01/17/2024    PROTTOTAL 7.0 01/17/2024    ALT 8 01/17/2024    AST 12 01/17/2024    ALKPHOS 140 01/17/2024    BILITOTAL <0.2 01/17/2024    " DANELLE 30 07/25/2022     OTHER:   Lab Results   Component Value Date    PH 7.45 07/27/2022    LACT 0.8 01/17/2024    A1C 5.6 07/19/2022    ADY 8.1 (L) 01/19/2024    PHOS 3.5 01/17/2024    MAG 2.3 01/19/2024    LIPASE 7 (L) 01/17/2024    TSH 0.85 10/22/2023    T4 0.77 02/07/2022    CRP 17.3 (H) 08/18/2022    SED 32 (H) 07/02/2023       Anesthesia Plan    ASA Status:  3    NPO Status:  NPO Appropriate    Anesthesia Type: MAC.     - Reason for MAC: straight local not clinically adequate              Consents    Anesthesia Plan(s) and associated risks, benefits, and realistic alternatives discussed. Questions answered and patient/representative(s) expressed understanding.     - Discussed: Risks, Benefits and Alternatives for BOTH SEDATION and the PROCEDURE were discussed     - Discussed with:  Patient            Postoperative Care    Pain management: IV analgesics.   PONV prophylaxis: Ondansetron (or other 5HT-3)     Comments:               Perry Prajapati MD    I have reviewed the pertinent notes and labs in the chart from the past 30 days and (re)examined the patient.  Any updates or changes from those notes are reflected in this note.

## 2024-01-19 NOTE — PROVIDER NOTIFICATION
Dr. Paniagua notified of IV vanco infiltration. Borders marked with a surgical marker, PIV removed, extremity elevated, and warm compress applied. MD to bedside, new orders noted to keep arm elevated.

## 2024-01-19 NOTE — PROGRESS NOTES
PRE-PROCEDURE NOTE      REASON FOR PROCEDURE: Diarrhea    History and Physical: Reviewed, no changes    Pre-sedation Assessment:     VS: AVSS  General: Alert, NAD  Airway: normal  Heart: RRR  Lungs: CTA    Previous Reaction to Sedation: None    Sedation Plan Based on Assessment: MAC    Mallampati: II    ASA Classification: 3      Impression: Patient deemed adequate candidate for <AC sedation    Plan: colonoscopy              Antonio Mcelroy MD  Thank you for the opportunity to participate in the care of this patient.   Please feel free to call me with any questions or concerns.  Phone number [phone].            1/19/2024 8:54 AM

## 2024-01-19 NOTE — PLAN OF CARE
Problem: Adult Inpatient Plan of Care  Goal: Absence of Hospital-Acquired Illness or Injury  Outcome: Met  Intervention: Identify and Manage Fall Risk  Recent Flowsheet Documentation  Taken 1/19/2024 0030 by Milena Anthony RN  Safety Promotion/Fall Prevention:   activity supervised   assistive device/personal items within reach   clutter free environment maintained   increased rounding and observation   mobility aid in reach   nonskid shoes/slippers when out of bed   patient and family education   room organization consistent   room near nurse's station   safety round/check completed   supervised activity     Problem: Risk for Delirium  Goal: Improved Attention and Thought Clarity  Outcome: Met  Intervention: Maximize Cognitive Function  Recent Flowsheet Documentation  Taken 1/19/2024 0030 by Milena Anthony RN  Sensory Stimulation Regulation:   lighting decreased   quiet environment promoted  Reorientation Measures:   calendar in view   clock in view     Problem: Fall Injury Risk  Goal: Absence of Fall and Fall-Related Injury  Outcome: Met  Intervention: Identify and Manage Contributors  Recent Flowsheet Documentation  Taken 1/19/2024 0030 by Milena Anthony RN  Medication Review/Management: medications reviewed  Intervention: Promote Injury-Free Environment  Recent Flowsheet Documentation  Taken 1/19/2024 0030 by Milena Anthony RN  Safety Promotion/Fall Prevention:   activity supervised   assistive device/personal items within reach   clutter free environment maintained   increased rounding and observation   mobility aid in reach   nonskid shoes/slippers when out of bed   patient and family education   room organization consistent   room near nurse's station   safety round/check completed   supervised activity   Goal Outcome Evaluation:         Pt. Alert and oriented. Drowsy. Weak. On telemetry NSR. Doing colonoscopy prep for AM. Frequent loose stools. IVF, IV ABX and IV K+ infused per orders. Pt. Having a  hard time getting all of the Golytely down. Encouraging every hour. Drank all of the mag citrate. More than 1/2 done with the Golytely. Vital signs stable. Will continue to monitor.    Milena Anthony RN

## 2024-01-19 NOTE — PHARMACY-VANCOMYCIN DOSING SERVICE
Pharmacy Vancomycin Note  Date of Service 2024  Patient's  1945   78 year old, female    Indication: Sepsis  Day of Therapy: 3  Current vancomycin regimen: 1250 mg IV q24h  Current vancomycin monitoring method: AUC  Current vancomycin therapeutic monitoring goal: 400-600 mg*h/L    Insight[employer] Prediction of Current Vancomycin Regimen  Loading dose: N/A  Regimen: 1250 mg IV every 24 hours.  Start time: 09:08 on 2024  Exposure target: AUC24 (range)400-600 mg/L.hr   AUC24,ss: 388 mg/L.hr  Probability of AUC24 > 400: 44 %  Ctrough,ss: 9.2 mg/L  Probability of Ctrough,ss > 20: 1 %  Probability of nephrotoxicity (Lodise ESTIVEN ): 5 %    Current estimated CrCl = Estimated Creatinine Clearance: 69.1 mL/min (based on SCr of 0.54 mg/dL).    Creatinine for last 3 days  2024: 10:55 AM Creatinine 0.76 mg/dL  2024:  6:33 AM Creatinine 0.62 mg/dL  2024:  7:21 AM Creatinine 0.54 mg/dL    Recent Vancomycin Levels (past 3 days)  2024:  7:21 AM Vancomycin 11.7 ug/mL    Vancomycin IV Administrations (past 72 hours)                     vancomycin (VANCOCIN) 1,250 mg in sodium chloride 0.9 % 250 mL intermittent infusion (mg) 1,250 mg New Bag 24 1334    vancomycin (VANCOCIN) 500 mg vial to attach to  mL bag (mg) 500 mg New Bag 24 0008    vancomycin (VANCOCIN) 1,250 mg in sodium chloride 0.9 % 250 mL intermittent infusion (mg) 1,250 mg New Bag 24 1224                    Nephrotoxins and other renal medications (From now, onward)      Start     Dose/Rate Route Frequency Ordered Stop    24 1200  [Auto Hold]  vancomycin (VANCOCIN) 1,250 mg in sodium chloride 0.9 % 250 mL intermittent infusion        (Auto Hold since 2024 at 0806.Hold Reason: Transfer to a procedural area)    1,250 mg  over 90 Minutes Intravenous EVERY 24 HOURS 24 0730                 Contrast Orders - past 72 hours (72h ago, onward)      None            Interpretation of levels and  current regimen:  Vancomycin level is reflective of AUC less than 400    Has serum creatinine changed greater than 50% in last 72 hours: No    Urine output: unable to determine    Renal Function: Stable    InsightRX Prediction of Planned New Vancomycin Regimen  Loading dose: N/A  Regimen: 1500 mg IV every 24 hours.  Start time: 09:08 on 01/19/2024  Exposure target: AUC24 (range)400-600 mg/L.hr   AUC24,ss: 463 mg/L.hr  Probability of AUC24 > 400: 77 %  Ctrough,ss: 11 mg/L  Probability of Ctrough,ss > 20: 4 %  Probability of nephrotoxicity (Lodise ESTIVEN 2009): 7 %    Plan:  Increase Dose to 1500 mg IV every 24 hours  Vancomycin monitoring method: AUC  Vancomycin therapeutic monitoring goal: 400-600 mg*h/L  Pharmacy will check vancomycin levels as appropriate in 1-3 Days.  Serum creatinine levels will be ordered daily for the first week of therapy and at least twice weekly for subsequent weeks.    CLIFFORD MATTA RP

## 2024-01-19 NOTE — ANESTHESIA CARE TRANSFER NOTE
Patient: Julisa Chaney    Procedure: Procedure(s):  COLONOSCOPY WITH RANDOM BIOPSIES       Diagnosis: Diarrhea, unspecified type [R19.7]  Diagnosis Additional Information: No value filed.    Anesthesia Type:   MAC     Note:    Oropharynx: oropharynx clear of all foreign objects  Level of Consciousness: awake  Oxygen Supplementation: face mask  Level of Supplemental Oxygen (L/min / FiO2): 8  Independent Airway: airway patency satisfactory and stable  Dentition: dentition unchanged  Vital Signs Stable: post-procedure vital signs reviewed and stable  Report to RN Given: handoff report given  Patient transferred to: Medical/Surgical Unit    Handoff Report: Identifed the Patient, Identified the Reponsible Provider, Reviewed the pertinent medical history, Discussed the surgical course, Reviewed Intra-OP anesthesia mangement and issues during anesthesia, Set expectations for post-procedure period and Allowed opportunity for questions and acknowledgement of understanding      Vitals:  Vitals Value Taken Time   /80 01/19/2024  0943   Temp 36.7 01/19/2024  0943   Pulse 80 01/19/2024  0943   Resp 12 01/19/2024  0943   SpO2 99 01/19/2024  0943       Electronically Signed By: DANIELLE Rivera CRNA  January 19, 2024  9:44 AM

## 2024-01-19 NOTE — CONSULTS
CLINICAL NUTRITION SERVICES - BRIEF NOTE      EVALUATION OF THE PROGRESS TOWARD GOALS   Diet: High Fiber diet    Supplements:  Gelatein and Ensure Clear with meals.   Eating small amounts.      NEW FINDINGS   Received consult for High Fiber and lactose free diet.   Patient resting in bed.  Patient reports not much appetite, she had some soup and eggs today and so far is feeling ok.  Patient has not yet tried the Ensure Clear or the gelatein.  We discussed the High fiber, lactose free diet.  Patient avoids most dairy foods.      Patient had a colonoscopy, has been tested for celiac markers, C-diff and stool studies.    Bile acid malabsorption can cause diarrhea.  Unsure if there is a test for this but consider trial of bile acid sequestrant.      INTERVENTIONS  Implementation  Discontinue the Gelatein as it contains whey.  Discontinue the Ensure clear as it is high in sugar and can contribute to osmotic diarrhea.   Offered Sarika [employer] supplement as it is lactose free. Provide [employer]ika [employer] standard daily.     Provided patient with printed handouts on high fiber diet and Diet for diarrhea-which avoids lactose, caffeine and high sugar foods.

## 2024-01-20 LAB
ANION GAP SERPL CALCULATED.3IONS-SCNC: 3 MMOL/L (ref 7–15)
BUN SERPL-MCNC: 2.3 MG/DL (ref 8–23)
CALCIUM SERPL-MCNC: 7.7 MG/DL (ref 8.8–10.2)
CHLORIDE SERPL-SCNC: 108 MMOL/L (ref 98–107)
CREAT SERPL-MCNC: 0.56 MG/DL (ref 0.51–0.95)
DEPRECATED HCO3 PLAS-SCNC: 33 MMOL/L (ref 22–29)
EGFRCR SERPLBLD CKD-EPI 2021: >90 ML/MIN/1.73M2
ERYTHROCYTE [DISTWIDTH] IN BLOOD BY AUTOMATED COUNT: 15.2 % (ref 10–15)
GLUCOSE SERPL-MCNC: 85 MG/DL (ref 70–99)
HCT VFR BLD AUTO: 40.2 % (ref 35–47)
HGB BLD-MCNC: 12.1 G/DL (ref 11.7–15.7)
MAGNESIUM SERPL-MCNC: 2 MG/DL (ref 1.7–2.3)
MCH RBC QN AUTO: 25.8 PG (ref 26.5–33)
MCHC RBC AUTO-ENTMCNC: 30.1 G/DL (ref 31.5–36.5)
MCV RBC AUTO: 86 FL (ref 78–100)
PLATELET # BLD AUTO: 151 10E3/UL (ref 150–450)
POTASSIUM SERPL-SCNC: 2.8 MMOL/L (ref 3.4–5.3)
POTASSIUM SERPL-SCNC: 3.5 MMOL/L (ref 3.4–5.3)
RBC # BLD AUTO: 4.69 10E6/UL (ref 3.8–5.2)
SODIUM SERPL-SCNC: 144 MMOL/L (ref 135–145)
WBC # BLD AUTO: 5.6 10E3/UL (ref 4–11)

## 2024-01-20 PROCEDURE — 85027 COMPLETE CBC AUTOMATED: CPT | Performed by: INTERNAL MEDICINE

## 2024-01-20 PROCEDURE — 999N000248 HC STATISTIC IV INSERT WITH US BY RN

## 2024-01-20 PROCEDURE — 258N000003 HC RX IP 258 OP 636: Performed by: INTERNAL MEDICINE

## 2024-01-20 PROCEDURE — 99232 SBSQ HOSP IP/OBS MODERATE 35: CPT | Performed by: INTERNAL MEDICINE

## 2024-01-20 PROCEDURE — 83735 ASSAY OF MAGNESIUM: CPT | Performed by: INTERNAL MEDICINE

## 2024-01-20 PROCEDURE — 250N000013 HC RX MED GY IP 250 OP 250 PS 637: Performed by: INTERNAL MEDICINE

## 2024-01-20 PROCEDURE — 84132 ASSAY OF SERUM POTASSIUM: CPT | Performed by: INTERNAL MEDICINE

## 2024-01-20 PROCEDURE — 210N000001 HC R&B IMCU HEART CARE

## 2024-01-20 PROCEDURE — 80048 BASIC METABOLIC PNL TOTAL CA: CPT | Performed by: INTERNAL MEDICINE

## 2024-01-20 PROCEDURE — 36415 COLL VENOUS BLD VENIPUNCTURE: CPT | Performed by: INTERNAL MEDICINE

## 2024-01-20 PROCEDURE — 250N000011 HC RX IP 250 OP 636: Performed by: INTERNAL MEDICINE

## 2024-01-20 PROCEDURE — 82784 ASSAY IGA/IGD/IGG/IGM EACH: CPT | Performed by: INTERNAL MEDICINE

## 2024-01-20 RX ORDER — SODIUM CHLORIDE 450 MG/100ML
INJECTION, SOLUTION INTRAVENOUS CONTINUOUS
Status: DISCONTINUED | OUTPATIENT
Start: 2024-01-20 | End: 2024-01-20

## 2024-01-20 RX ORDER — POTASSIUM CHLORIDE 7.45 MG/ML
10 INJECTION INTRAVENOUS
Status: COMPLETED | OUTPATIENT
Start: 2024-01-20 | End: 2024-01-20

## 2024-01-20 RX ORDER — SODIUM CHLORIDE 9 MG/ML
INJECTION, SOLUTION INTRAVENOUS CONTINUOUS
Status: DISCONTINUED | OUTPATIENT
Start: 2024-01-20 | End: 2024-01-24 | Stop reason: HOSPADM

## 2024-01-20 RX ADMIN — LEVETIRACETAM 750 MG: 500 TABLET, FILM COATED ORAL at 10:17

## 2024-01-20 RX ADMIN — MIRTAZAPINE 15 MG: 15 TABLET, FILM COATED ORAL at 21:55

## 2024-01-20 RX ADMIN — ACETAMINOPHEN AND CODEINE PHOSPHATE 1 TABLET: 300; 30 TABLET ORAL at 00:15

## 2024-01-20 RX ADMIN — POTASSIUM CHLORIDE 10 MEQ: 10 INJECTION, SOLUTION INTRAVENOUS at 05:42

## 2024-01-20 RX ADMIN — QUETIAPINE FUMARATE 50 MG: 25 TABLET ORAL at 21:54

## 2024-01-20 RX ADMIN — PANTOPRAZOLE SODIUM 40 MG: 40 TABLET, DELAYED RELEASE ORAL at 15:51

## 2024-01-20 RX ADMIN — GABAPENTIN 300 MG: 300 CAPSULE ORAL at 21:54

## 2024-01-20 RX ADMIN — LEVETIRACETAM 750 MG: 500 TABLET, FILM COATED ORAL at 21:53

## 2024-01-20 RX ADMIN — OXCARBAZEPINE 450 MG: 150 TABLET, FILM COATED ORAL at 10:16

## 2024-01-20 RX ADMIN — CEFEPIME HYDROCHLORIDE 1 G: 1 INJECTION, POWDER, FOR SOLUTION INTRAMUSCULAR; INTRAVENOUS at 11:15

## 2024-01-20 RX ADMIN — POTASSIUM CHLORIDE 10 MEQ: 10 INJECTION, SOLUTION INTRAVENOUS at 10:17

## 2024-01-20 RX ADMIN — POTASSIUM CHLORIDE 10 MEQ: 10 INJECTION, SOLUTION INTRAVENOUS at 15:22

## 2024-01-20 RX ADMIN — POTASSIUM CHLORIDE 10 MEQ: 10 INJECTION, SOLUTION INTRAVENOUS at 14:20

## 2024-01-20 RX ADMIN — MECLIZINE 12.5 MG: 12.5 TABLET ORAL at 21:55

## 2024-01-20 RX ADMIN — NORTRIPTYLINE HYDROCHLORIDE 50 MG: 50 CAPSULE ORAL at 21:56

## 2024-01-20 RX ADMIN — MECLIZINE 12.5 MG: 12.5 TABLET ORAL at 10:17

## 2024-01-20 RX ADMIN — PANTOPRAZOLE SODIUM 40 MG: 40 TABLET, DELAYED RELEASE ORAL at 07:16

## 2024-01-20 RX ADMIN — CEFEPIME HYDROCHLORIDE 1 G: 1 INJECTION, POWDER, FOR SOLUTION INTRAMUSCULAR; INTRAVENOUS at 21:58

## 2024-01-20 RX ADMIN — ACETAMINOPHEN AND CODEINE PHOSPHATE 1 TABLET: 300; 30 TABLET ORAL at 22:03

## 2024-01-20 RX ADMIN — ACETAMINOPHEN AND CODEINE PHOSPHATE 1 TABLET: 300; 30 TABLET ORAL at 10:27

## 2024-01-20 RX ADMIN — GABAPENTIN 100 MG: 100 CAPSULE ORAL at 10:17

## 2024-01-20 RX ADMIN — POTASSIUM CHLORIDE 10 MEQ: 10 INJECTION, SOLUTION INTRAVENOUS at 11:10

## 2024-01-20 RX ADMIN — SODIUM CHLORIDE: 9 INJECTION, SOLUTION INTRAVENOUS at 05:01

## 2024-01-20 RX ADMIN — POTASSIUM CHLORIDE 10 MEQ: 10 INJECTION, SOLUTION INTRAVENOUS at 13:16

## 2024-01-20 RX ADMIN — HYDROXYZINE HYDROCHLORIDE 10 MG: 10 TABLET ORAL at 13:58

## 2024-01-20 RX ADMIN — TOPIRAMATE 50 MG: 25 TABLET, FILM COATED ORAL at 21:55

## 2024-01-20 ASSESSMENT — ACTIVITIES OF DAILY LIVING (ADL)
ADLS_ACUITY_SCORE: 48
ADLS_ACUITY_SCORE: 52
ADLS_ACUITY_SCORE: 53
ADLS_ACUITY_SCORE: 48
ADLS_ACUITY_SCORE: 52
ADLS_ACUITY_SCORE: 49
ADLS_ACUITY_SCORE: 53
ADLS_ACUITY_SCORE: 53
ADLS_ACUITY_SCORE: 48
ADLS_ACUITY_SCORE: 52
ADLS_ACUITY_SCORE: 53
ADLS_ACUITY_SCORE: 48

## 2024-01-20 NOTE — PROGRESS NOTES
[facility]    Medicine Progress Note - Hospitalist Service    Date of Admission:  1/17/2024    Assessment & Plan   Julisa Chaney is a 78 year old female admitted on 1/17/2024. She has a hx of chronic pain, seizures, who has had diarrhea for months, comes in with dehydration, concern for hypotension, possible sepsis    1.Hypotension, weakness  -concern sepsis, vs dehydration, hypokalemia  -I am more concerned now about ongoing dehydration, hypokalemia, from severe GI losses  -ivf , has helped bp, wean now  -in ER got Cef + vanco to cover sepsis--will de-escalate now stop vanco now and stop cefepime after tonight's dose as cx NTD  -check stool cx, neg for c diff  -tele  -check cortisol-ok  -check procal-normal   -check blood cx-so far NTD    2.Diarrhea  -for months, 7 times/24 hours at home  -was to see GI in clinic 2/20  -appreciate GI seeing here, sent of Celiac studies, and now plan on colonoscopy here  -send stools cx  -ivf  -no sign of bleeding  -?microscopic colitis , with prior norovirus just before this onset , also on ppi  -1/19 colonoscopy--BX pending   -diet now per GI high fiber, lactose free     3.Hypokalemia-severe, K-still issue here  -refused po k  -tele  -watch mag close  -checked phos    4.Hypernatremia, 146 then improved,toady 144  -suspect due to dehydration  -she is able to take po-was drinking water  -IVF wean NS, (still on this since getting free water with all iv k)  -replete volume    5.Hx of Sz  -on meds    6.hx of gerd  -on ppi    7.Failure to thrive--from ongoing stool issues  -lives alone  -will need Pt/OT eval--now will likely need TCU    8.Hypomag  -has had iv mag this stay prn    9.DVT prevent- scd    10.code-full    I called daughter to update at 1404                  Diet: Snacks/Supplements Adult: Other; PRN; With Meals  High Fiber Diet  Snacks/Supplements Adult: Sarika [employer] Standard Oral Supplement; Between Meals    DVT Prophylaxis: Pneumatic Compression  Devices  Abrams Catheter: Not present  Lines: None     Cardiac Monitoring: None  Code Status: Full Code      Clinically Significant Risk Factors        # Hypokalemia: Lowest K = 2.8 mmol/L in last 2 days, will replace as needed       # Hypoalbuminemia: Lowest albumin = 3.3 g/dL at 1/17/2024 10:55 AM, will monitor as appropriate             # Severe Malnutrition: based on nutrition assessment, PRESENT ON ADMISSION          Disposition Plan      Expected Discharge Date: 01/21/2024      Destination: inpatient rehabilitation facility  Discharge Comments: Needs TCU            Laxmi Paniagua MD  Hospitalist Service  [facility]  Securely message with [employer] (more info)  Text page via [employer] Paging/Directory   ______________________________________________________________________    Interval History   She notes no loose stool overnight  Had some last evening  Still gets some abd pain right before passes stool then relief when done  Eating some       Physical Exam   Vital Signs: Temp: 98.4  F (36.9  C) Temp src: Oral BP: 138/64 Pulse: 92   Resp: 20 SpO2: 93 % O2 Device: None (Room air)    Weight: 116 lbs 2.92 oz    Constitutional: awake, alert, cooperative, and no apparent distress  Respiratory: no increased work of breathing and good air exchange, CTA  Cardiovascular: regular rate and rhythm and normal S1 and S2  GI: normal bowel sounds, soft, non-distended, and non-tender  Skin: no bruising or bleeding  Musculoskeletal: no lower extremity pitting edema present  Neurologic: Mental Status Exam:  Level of Alertness:   awake  Neuropsychiatric: General: normal, calm, and normal eye contact    Medical Decision Making       35 MINUTES SPENT BY ME on the date of service doing chart review, history, exam, documentation & further activities per the note.      Data     I have personally reviewed the following data over the past 24 hrs:    5.6  \   12.1   / 151     144 108 (H) 2.3 (L) /  85   2.8 (L) 33 (H)  0.56 \       Imaging results reviewed over the past 24 hrs:   No results found for this or any previous visit (from the past 24 hour(s)).

## 2024-01-20 NOTE — PROGRESS NOTES
Care Management Follow Up     Length of Stay (days): 3     Expected Discharge Date: 01/21/2024     Concerns to be Addressed: discharge planning     Patient plan of care discussed at interdisciplinary rounds: Yes     Anticipated Discharge Disposition:  [facility]     Anticipated Discharge Services:  RN, PT, OT  Anticipated Discharge DME:  emliie     Patient/family educated on Medicare website which has current facility and service quality ratings:  yes  Education Provided on the Discharge Plan:  yes  Patient/Family in Agreement with the Plan:       Referrals Placed by CM/SW:  TCU  Private pay costs discussed: Not applicable     Additional Information:  Chart reviewed and discussed plan with hospitalist  Patient's electrolytes are off d/t colonoscopy prep. IV infiltrated overnight. Not yet ready for discharge.    Patient has been accepted at [facility]. Emily Michele received, copy placed in hard chart.  Yesterday SW updated daughter Alissa by phone and she was in agreement with discharge plan. Anticipate [facility] wheelchair transport on discharge.    AAMIR Tapia

## 2024-01-20 NOTE — PROGRESS NOTES
[facility] Progress Note     SUBJECTIVE:    Patient had a colonoscopy yesterday that was grossly normal.  Biopsies were taken and results are still pending.  Patient reports that her most recent stools were semiformed without any watery or loose stools.    OBJECTIVE:  /64 (BP Location: Right arm)   Pulse 92   Temp 98.4  F (36.9  C) (Oral)   Resp 20   Wt 52.7 kg (116 lb 2.9 oz)   SpO2 93%   BMI 18.75 kg/m    Temp (24hrs), Av.3  F (36.8  C), Min:97.9  F (36.6  C), Max:98.9  F (37.2  C)    Patient Vitals for the past 72 hrs:   Weight   24 0449 52.7 kg (116 lb 2.9 oz)   24 0405 51 kg (112 lb 7 oz)   24 0429 51.1 kg (112 lb 10.5 oz)     Intake/Output Summary (Last 24 hours) at 2024 0808  Last data filed at 2024 0646  Gross per 24 hour   Intake 2123 ml   Output --   Net 2123 ml     PHYSICAL EXAM  GEN: No acute distress  ABD: Soft and non-tender    Additional Data:  I have reviewed the patient's new clinical lab results:     Recent Labs   Lab Test 24  0633 22  0559 22  1414 22  0408 22  2231 22  0923 22  2255   WBC 5.6 6.0 6.3   < >  --    < > 6.8   < >  --    HGB 12.1 12.7 12.3   < >  --    < > 9.6*   < >  --    MCV 86 87 88   < >  --    < > 73*   < >  --     476* 427   < >  --    < > 547*   < >  --    INR  --   --   --   --  1.34*  --  1.13  --  1.36*    < > = values in this interval not displayed.     Recent Labs   Lab Test 24  0724  2310 24  0633   POTASSIUM 2.8* 3.8  3.8 3.4 2.9*   CHLORIDE 108* 107  --  109*   CO2 33* 26  --  28   BUN 2.3* 3.2*  --  7.8*   ANIONGAP 3* 12  --  9     Recent Labs   Lab Test 24  1155 24  1055 10/22/23  1105 23  1906 23  1721 23  0629 23  0407 22  1901   ALBUMIN  --  3.3* 3.6  --  3.6   < >  --    < > 3.6   BILITOTAL  --  <0.2 0.2  --  <0.2   < >  --    < > 0.5    ALT  --  8 15  --  12   < >  --    < > 31   AST  --  12 16  --  20   < >  --    < > 25   PROTEIN 30*  --   --  Negative  --   --  10*   < >  --    LIPASE  --  7* 6*  --   --   --   --   --  <9    < > = values in this interval not displayed.     Enteric bacteria and virus panel PCR 1/17/2024-Negative  C. Diff toxin B PCR w/ reflex-Negative  Routine parasitology exam-pending    Imaging results:  CT abdomen/pelvis 1/17/2024  IMPRESSION:   1.  Liquid stool throughout the colon, nonspecific but can be seen in setting of enteritis/colitis.  2.  Additional details in the findings.      CT head w/o contrast 1/17/2024  IMPRESSION:  1.  Stable compared to previous head CT 10/22/2023.  2.  No intracranial hemorrhage, mass lesions, hydrocephalus or CT evidence for an acute infarct.  3.  Moderate-sized area of encephalomalacia involving the right cerebellar hemisphere. Small encephalomalacia in the left cerebellar hemisphere.   4.  Mild diffuse cerebral parenchymal volume loss. Presumed chronic hypertensive/microvascular ischemic white matter changes.     CXR 1/17/2024  IMPRESSION: Low lung volumes with right basilar atelectasis or scarring. Remainder of the lungs are clear. Cardiomediastinal silhouette within normal limits. No large pleural effusion. No significant interval change.      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 difficile (8/2022) and norovirus (10/2023) who presented to the ED with diarrhea, fatigue, and weakness. We were asked to consult for chronic diarrhea.      TSH two months ago seen on chart review was normal. Enteric bacteria and viral stool panel and C. Diff stool test are negative.  Colonoscopy yesterday did not reveal any significant abnormalities.  Random colon biopsies are pending.  Patient reports that her most recent stools were  semiformed and she denies watery or loose stool currently.     PLAN:  -Parasite stool studies pending  -Celiac labs pending  -Replete potassium  -Can start Imodium 1-2 tablets q6h as needed for diarrhea  -Follow-up on colonoscopy biopsy results  -Will follow peripherally for now until the colonoscopy biopsy results are back    Beatrice Fong MD  [facility]  ________________________________________________________________________

## 2024-01-20 NOTE — PLAN OF CARE
Problem: Adult Inpatient Plan of Care  Goal: Plan of Care Review  Description: The Plan of Care Review/Shift note should be completed every shift.  The Outcome Evaluation is a brief statement about your assessment that the patient is improving, declining, or no change.  This information will be displayed automatically on your shift  note.  Outcome: Progressing  Flowsheets (Taken 1/20/2024 0225)  Plan of Care Reviewed With: patient  Overall Patient Progress: no change     Problem: Adult Inpatient Plan of Care  Goal: Optimal Comfort and Wellbeing  Outcome: Progressing  Intervention: Monitor Pain and Promote Comfort  Recent Flowsheet Documentation  Taken 1/20/2024 0015 by Vita Mcdonnell, RN  Pain Management Interventions: medication (see MAR)  Taken 1/19/2024 2000 by Vita Mcdonnell RN  Pain Management Interventions:   pain management plan reviewed with patient/caregiver   rest   care clustered     Problem: Risk for Delirium  Goal: Improved Sleep  Outcome: Progressing     Problem: Electrolyte Imbalance  Goal: Electrolyte Balance  Outcome: Progressing   Goal Outcome Evaluation:      Plan of Care Reviewed With: patient    Overall Patient Progress: no changeOverall Patient Progress: no change         Disoriented to time. VSS on RA. NSR. PRN Tylenol #3 given for chronic back pain. Incontinent of bowel and bladder. Normal saline running at 75ml/hr. K+ being replaced, recheck 1-2 hrs after last IV dose.

## 2024-01-20 NOTE — PLAN OF CARE
Problem: Adult Inpatient Plan of Care  Goal: Optimal Comfort and Wellbeing  Outcome: Progressing     Problem: Adult Inpatient Plan of Care  Goal: Absence of Hospital-Acquired Illness or Injury  Intervention: Prevent Skin Injury  Recent Flowsheet Documentation  Taken 1/20/2024 1018 by Tim Abreu, RN  Skin Protection: silicone foam dressing in place   Goal Outcome Evaluation:       Perineal skin cleansed following fecal incontinence episodes. Pt's pain managed with PRN acetaminophen with codeine. Pt's anxiety treated with PRN hydroxyzine. Pt's infiltrated IV replaced by PICC team. Pt currently receiving final dose of IV potassium chloride. Pt's heart rhythm is Sinus with prolonged Qtc. Pt is tolerating continuous NS infusion well. Pt tolerated IV antibiotics well. Enteric precautions maintained.   Tim Abreu RN  1/20/2024  3:41 PM

## 2024-01-20 NOTE — PLAN OF CARE
Problem: Electrolyte Imbalance  Goal: Electrolyte Balance  Outcome: Progressing     Problem: Fatigue  Goal: Improved Activity Tolerance  Outcome: Progressing     Pt A/Ox3. Pain controlled with PRN tylenol #3 per pt. PRN hydroxyzine given x1 for anxiety, intervention effective per pt. Refused to get out of bed this shift, activity encouraged. K protocol, recheck 1/20 AM. NS running at 75ml/hr per orders.

## 2024-01-21 ENCOUNTER — APPOINTMENT (OUTPATIENT)
Dept: PHYSICAL THERAPY | Facility: HOSPITAL | Age: 79
DRG: 391 | End: 2024-01-21
Payer: COMMERCIAL

## 2024-01-21 ENCOUNTER — APPOINTMENT (OUTPATIENT)
Dept: OCCUPATIONAL THERAPY | Facility: HOSPITAL | Age: 79
DRG: 391 | End: 2024-01-21
Payer: COMMERCIAL

## 2024-01-21 LAB
ANION GAP SERPL CALCULATED.3IONS-SCNC: 11 MMOL/L (ref 7–15)
BUN SERPL-MCNC: 1.8 MG/DL (ref 8–23)
CALCIUM SERPL-MCNC: 8 MG/DL (ref 8.8–10.2)
CHLORIDE SERPL-SCNC: 102 MMOL/L (ref 98–107)
CREAT SERPL-MCNC: 0.48 MG/DL (ref 0.51–0.95)
DEPRECATED HCO3 PLAS-SCNC: 26 MMOL/L (ref 22–29)
EGFRCR SERPLBLD CKD-EPI 2021: >90 ML/MIN/1.73M2
ERYTHROCYTE [DISTWIDTH] IN BLOOD BY AUTOMATED COUNT: 15.2 % (ref 10–15)
GLUCOSE SERPL-MCNC: 80 MG/DL (ref 70–99)
HCT VFR BLD AUTO: 46 % (ref 35–47)
HGB BLD-MCNC: 13.9 G/DL (ref 11.7–15.7)
HOLD SPECIMEN: NORMAL
MAGNESIUM SERPL-MCNC: 1.8 MG/DL (ref 1.7–2.3)
MCH RBC QN AUTO: 25.9 PG (ref 26.5–33)
MCHC RBC AUTO-ENTMCNC: 30.2 G/DL (ref 31.5–36.5)
MCV RBC AUTO: 86 FL (ref 78–100)
PHOSPHATE SERPL-MCNC: 1.7 MG/DL (ref 2.5–4.5)
PHOSPHATE SERPL-MCNC: 3.1 MG/DL (ref 2.5–4.5)
PLATELET # BLD AUTO: 438 10E3/UL (ref 150–450)
POTASSIUM SERPL-SCNC: 3.7 MMOL/L (ref 3.4–5.3)
RBC # BLD AUTO: 5.37 10E6/UL (ref 3.8–5.2)
SODIUM SERPL-SCNC: 139 MMOL/L (ref 135–145)
WBC # BLD AUTO: 6.2 10E3/UL (ref 4–11)

## 2024-01-21 PROCEDURE — 258N000003 HC RX IP 258 OP 636: Performed by: INTERNAL MEDICINE

## 2024-01-21 PROCEDURE — 250N000013 HC RX MED GY IP 250 OP 250 PS 637: Performed by: INTERNAL MEDICINE

## 2024-01-21 PROCEDURE — 80048 BASIC METABOLIC PNL TOTAL CA: CPT | Performed by: INTERNAL MEDICINE

## 2024-01-21 PROCEDURE — 83735 ASSAY OF MAGNESIUM: CPT | Performed by: INTERNAL MEDICINE

## 2024-01-21 PROCEDURE — 36415 COLL VENOUS BLD VENIPUNCTURE: CPT | Performed by: INTERNAL MEDICINE

## 2024-01-21 PROCEDURE — 97535 SELF CARE MNGMENT TRAINING: CPT | Mod: GO

## 2024-01-21 PROCEDURE — 97116 GAIT TRAINING THERAPY: CPT | Mod: GP

## 2024-01-21 PROCEDURE — 250N000009 HC RX 250: Performed by: INTERNAL MEDICINE

## 2024-01-21 PROCEDURE — 210N000001 HC R&B IMCU HEART CARE

## 2024-01-21 PROCEDURE — 85027 COMPLETE CBC AUTOMATED: CPT | Performed by: INTERNAL MEDICINE

## 2024-01-21 PROCEDURE — 99232 SBSQ HOSP IP/OBS MODERATE 35: CPT | Performed by: INTERNAL MEDICINE

## 2024-01-21 PROCEDURE — 84100 ASSAY OF PHOSPHORUS: CPT | Performed by: INTERNAL MEDICINE

## 2024-01-21 PROCEDURE — 97530 THERAPEUTIC ACTIVITIES: CPT | Mod: GP

## 2024-01-21 RX ADMIN — OXCARBAZEPINE 450 MG: 150 TABLET, FILM COATED ORAL at 08:47

## 2024-01-21 RX ADMIN — PANTOPRAZOLE SODIUM 40 MG: 40 TABLET, DELAYED RELEASE ORAL at 15:46

## 2024-01-21 RX ADMIN — GABAPENTIN 300 MG: 300 CAPSULE ORAL at 21:00

## 2024-01-21 RX ADMIN — QUETIAPINE FUMARATE 50 MG: 25 TABLET ORAL at 21:00

## 2024-01-21 RX ADMIN — NORTRIPTYLINE HYDROCHLORIDE 50 MG: 50 CAPSULE ORAL at 21:00

## 2024-01-21 RX ADMIN — TOPIRAMATE 50 MG: 25 TABLET, FILM COATED ORAL at 21:00

## 2024-01-21 RX ADMIN — MECLIZINE 12.5 MG: 12.5 TABLET ORAL at 08:46

## 2024-01-21 RX ADMIN — SODIUM PHOSPHATE, MONOBASIC, MONOHYDRATE AND SODIUM PHOSPHATE, DIBASIC, ANHYDROUS 15 MMOL: 142; 276 INJECTION, SOLUTION INTRAVENOUS at 09:01

## 2024-01-21 RX ADMIN — ACETAMINOPHEN AND CODEINE PHOSPHATE 1 TABLET: 300; 30 TABLET ORAL at 04:45

## 2024-01-21 RX ADMIN — MIRTAZAPINE 15 MG: 15 TABLET, FILM COATED ORAL at 21:00

## 2024-01-21 RX ADMIN — ACETAMINOPHEN AND CODEINE PHOSPHATE 1 TABLET: 300; 30 TABLET ORAL at 10:54

## 2024-01-21 RX ADMIN — HYDROXYZINE HYDROCHLORIDE 10 MG: 10 TABLET ORAL at 01:37

## 2024-01-21 RX ADMIN — ACETAMINOPHEN AND CODEINE PHOSPHATE 1 TABLET: 300; 30 TABLET ORAL at 23:01

## 2024-01-21 RX ADMIN — LEVETIRACETAM 750 MG: 500 TABLET, FILM COATED ORAL at 08:46

## 2024-01-21 RX ADMIN — SODIUM CHLORIDE: 9 INJECTION, SOLUTION INTRAVENOUS at 22:57

## 2024-01-21 RX ADMIN — ACETAMINOPHEN AND CODEINE PHOSPHATE 1 TABLET: 300; 30 TABLET ORAL at 17:01

## 2024-01-21 RX ADMIN — LEVETIRACETAM 750 MG: 500 TABLET, FILM COATED ORAL at 19:24

## 2024-01-21 RX ADMIN — GABAPENTIN 100 MG: 100 CAPSULE ORAL at 08:46

## 2024-01-21 RX ADMIN — PANTOPRAZOLE SODIUM 40 MG: 40 TABLET, DELAYED RELEASE ORAL at 08:46

## 2024-01-21 RX ADMIN — MECLIZINE 12.5 MG: 12.5 TABLET ORAL at 19:24

## 2024-01-21 ASSESSMENT — ACTIVITIES OF DAILY LIVING (ADL)
ADLS_ACUITY_SCORE: 52
ADLS_ACUITY_SCORE: 50
ADLS_ACUITY_SCORE: 52

## 2024-01-21 NOTE — PROGRESS NOTES
[facility]    Medicine Progress Note - Hospitalist Service    Date of Admission:  1/17/2024    Assessment & Plan   Julisa Chaney is a 78 year old female admitted on 1/17/2024. She has a hx of chronic pain, seizures, who has had diarrhea for months, comes in with dehydration, concern for hypotension, possible sepsis    1.Hypotension, weakness on admit, dehydration  -concern for possible sepsis, vs dehydration, hypokalemia  -I am more concerned now about ongoing dehydration, hypokalemia, from severe GI losses  -ivf , has helped bp, wean now  -in ER got Cef + vanco to cover sepsis--was on these til 1/20  - cx NTD--now as of 1/21 of antibiotics  -check stool cx, neg for c diff  -tele  -check cortisol-ok  -check procal-normal   -check blood cx-so far NTD    2.Diarrhea  -for months, 7 times/24 hours at home--slowed down here  -was to see GI in clinic 2/20  -appreciate GI seeing here, sent of Celiac studies, and  colonoscopy here  -send stools cx  -ivf  -no sign of bleeding  -?microscopic colitis , with prior norovirus just before this onset , also on ppi  -1/19 colonoscopy--BX pending   -diet now per GI high fiber, lactose free     3.Hypokalemia-severe, K-still issue here  -refused po k  -tele    4.Hypernatremia, 146 -->144-->139 today  -suspect due to dehydration on admit  -IVF wean NS  -replete volume  -improved    5.Hx of Sz  -on meds    6.hx of gerd  -on ppi    7.Failure to thrive--from ongoing stool issues  -lives alone  -will need Pt/OT eval- needs TCU--today PT said improving, but still needs TCU now considered 1 assist vs 2 assist chair to bed    8.Hypomag  -has had iv mag this stay prn    9.Hypophos  -replaced    10.DVT prevent- scd    11.code-full            Diet: Snacks/Supplements Adult: Other; PRN; With Meals  High Fiber Diet  Snacks/Supplements Adult: Sarika [employer] Standard Oral Supplement; Between Meals    DVT Prophylaxis: Pneumatic Compression Devices  Abrams Catheter: Not  present  Lines: None     Cardiac Monitoring: ACTIVE order. Indication: QTc prolonging medication (48 hours)  Code Status: Full Code      Clinically Significant Risk Factors        # Hypokalemia: Lowest K = 2.8 mmol/L in last 2 days, will replace as needed       # Hypoalbuminemia: Lowest albumin = 3.3 g/dL at 1/17/2024 10:55 AM, will monitor as appropriate             # Severe Malnutrition: based on nutrition assessment, PRESENT ON ADMISSION          Disposition Plan      Expected Discharge Date: 01/22/2024      Destination: inpatient rehabilitation facility  Discharge Comments: Needs TCU            Laxmi Paniagua MD  Hospitalist Service  [facility]  Securely message with [employer] (more info)  Text page via [employer] Paging/Directory   ______________________________________________________________________    Interval History   She notes some loose stools , RN notes much less  No abd pain   PT working with her notes improvement chair to bed as 1 assist vs 2      Physical Exam   Vital Signs: Temp: 98.6  F (37  C) Temp src: Oral BP: 113/57 Pulse: 98   Resp: 18 SpO2: 93 % O2 Device: None (Room air)    Weight: 107 lbs 5.82 oz    Constitutional: awake, alert, cooperative, and no apparent distress  Respiratory: no increased work of breathing, good air exchange, no retractions, and clear to auscultation  Cardiovascular: regular rate and rhythm and normal S1 and S2  GI: normal bowel sounds, soft, non-distended, and non-tender  Skin: no bruising or bleeding  Musculoskeletal: no lower extremity pitting edema present  Neurologic: Mental Status Exam:  Level of Alertness:   awake  Neuropsychiatric: General: normal, calm, and normal eye contact    Medical Decision Making       35 MINUTES SPENT BY ME on the date of service doing chart review, history, exam, documentation & further activities per the note.      Data     I have personally reviewed the following data over the past 24 hrs:    6.2  \   13.9   / 438      139 102 1.8 (L) /  80   3.7 26 0.48 (L) \       Imaging results reviewed over the past 24 hrs:   No results found for this or any previous visit (from the past 24 hour(s)).

## 2024-01-21 NOTE — PLAN OF CARE
Problem: Adult Inpatient Plan of Care  Goal: Optimal Comfort and Wellbeing  Outcome: Progressing     Problem: Electrolyte Imbalance  Goal: Electrolyte Balance  Outcome: Progressing     Problem: Fatigue  Goal: Improved Activity Tolerance  Outcome: Progressing  Intervention: Promote Improved Energy  Recent Flowsheet Documentation  Taken 1/21/2024 1330 by Sarina Smith, RN  Activity Management: activity encouraged  Taken 1/21/2024 0840 by Sarina Smith, RN  Activity Management: ambulated in room   Goal Outcome Evaluation:  Patient on Potassium and Phosphorus protocol. Replaced Phosphorus. VSS. Up with assistance of one to the bedside commode. Patient ate pretty well today. Had one very small liquid BM today still loose but she feels it is firming up.      Sarina Smith, RN

## 2024-01-21 NOTE — PLAN OF CARE
Problem: Adult Inpatient Plan of Care  Goal: Plan of Care Review  Description: The Plan of Care Review/Shift note should be completed every shift.  The Outcome Evaluation is a brief statement about your assessment that the patient is improving, declining, or no change.  This information will be displayed automatically on your shift  note.  Outcome: Progressing     Problem: Fatigue  Goal: Improved Activity Tolerance  Outcome: Progressing  Intervention: Promote Improved Energy  Recent Flowsheet Documentation  Taken 1/20/2024 1641 by Shilpa Durán RN  Activity Management:   ambulated in room   back to bed  Taken 1/20/2024 1545 by Shilpa Durán, RN  Activity Management: activity adjusted per tolerance   Goal Outcome Evaluation:       Pt still looks very fatigued, uses a bed pan. Takes tylenol with codeine every 6 hours for pain. Pt does have a heart mepilex on the coccyx area. Pt is alert but forgetful. Also had ATARAX  x 1 at 0137 for anxiety. NS running at 50 mls per hour. Will continue to monitor pt.

## 2024-01-21 NOTE — PROGRESS NOTES
Care Management Follow Up    Length of Stay (days): 4    Expected Discharge Date: 01/22/2024     Concerns to be Addressed: discharge planning     Patient plan of care discussed at interdisciplinary rounds: Yes    Anticipated Discharge Disposition:  [facility]     Anticipated Discharge Services:  TCU  Anticipated Discharge DME:  RUDY    Patient/family educated on Medicare website which has current facility and service quality ratings:  yes  Education Provided on the Discharge Plan:  yes  Patient/Family in Agreement with the Plan:  yes    Referrals Placed by CM/SW:   [facility]  Private pay costs discussed: transportation costs    Additional Information:  Previously assessed.  MD reports patient maybe ready to discharge tomorrow to TCU.  [facility] called to inquire about patient discharge. They are prepared for patient tomorrow.     PAS # [id number]    ARISTIDES Mckeon   12:02 PM  01/21/24

## 2024-01-22 ENCOUNTER — APPOINTMENT (OUTPATIENT)
Dept: OCCUPATIONAL THERAPY | Facility: HOSPITAL | Age: 79
DRG: 391 | End: 2024-01-22
Payer: COMMERCIAL

## 2024-01-22 ENCOUNTER — APPOINTMENT (OUTPATIENT)
Dept: PHYSICAL THERAPY | Facility: HOSPITAL | Age: 79
DRG: 391 | End: 2024-01-22
Payer: COMMERCIAL

## 2024-01-22 LAB
ANION GAP SERPL CALCULATED.3IONS-SCNC: 8 MMOL/L (ref 7–15)
ATRIAL RATE - MUSE: 75 BPM
BACTERIA BLD CULT: NO GROWTH
BACTERIA BLD CULT: NO GROWTH
BUN SERPL-MCNC: 2.6 MG/DL (ref 8–23)
CALCIUM SERPL-MCNC: 7.7 MG/DL (ref 8.8–10.2)
CHLORIDE SERPL-SCNC: 107 MMOL/L (ref 98–107)
CREAT SERPL-MCNC: 0.55 MG/DL (ref 0.51–0.95)
DEPRECATED HCO3 PLAS-SCNC: 30 MMOL/L (ref 22–29)
DIASTOLIC BLOOD PRESSURE - MUSE: NORMAL MMHG
EGFRCR SERPLBLD CKD-EPI 2021: >90 ML/MIN/1.73M2
ERYTHROCYTE [DISTWIDTH] IN BLOOD BY AUTOMATED COUNT: 15.2 % (ref 10–15)
GLUCOSE SERPL-MCNC: 75 MG/DL (ref 70–99)
HCT VFR BLD AUTO: 34.9 % (ref 35–47)
HGB BLD-MCNC: 10.6 G/DL (ref 11.7–15.7)
HGB BLD-MCNC: 10.8 G/DL (ref 11.7–15.7)
IGA SERPL-MCNC: 457 MG/DL (ref 84–499)
INTERPRETATION ECG - MUSE: NORMAL
MAGNESIUM SERPL-MCNC: 1.7 MG/DL (ref 1.7–2.3)
MCH RBC QN AUTO: 25.5 PG (ref 26.5–33)
MCHC RBC AUTO-ENTMCNC: 30.4 G/DL (ref 31.5–36.5)
MCV RBC AUTO: 84 FL (ref 78–100)
O+P STL MICRO: NEGATIVE
P AXIS - MUSE: 72 DEGREES
PATH REPORT.COMMENTS IMP SPEC: NORMAL
PATH REPORT.COMMENTS IMP SPEC: NORMAL
PATH REPORT.FINAL DX SPEC: NORMAL
PATH REPORT.GROSS SPEC: NORMAL
PATH REPORT.MICROSCOPIC SPEC OTHER STN: NORMAL
PATH REPORT.RELEVANT HX SPEC: NORMAL
PHOSPHATE SERPL-MCNC: 2.5 MG/DL (ref 2.5–4.5)
PHOTO IMAGE: NORMAL
PLATELET # BLD AUTO: 414 10E3/UL (ref 150–450)
POTASSIUM SERPL-SCNC: 2.6 MMOL/L (ref 3.4–5.3)
POTASSIUM SERPL-SCNC: 3.2 MMOL/L (ref 3.4–5.3)
POTASSIUM SERPL-SCNC: 3.7 MMOL/L (ref 3.4–5.3)
PR INTERVAL - MUSE: 154 MS
QRS DURATION - MUSE: 78 MS
QT - MUSE: 390 MS
QTC - MUSE: 435 MS
R AXIS - MUSE: 65 DEGREES
RBC # BLD AUTO: 4.15 10E6/UL (ref 3.8–5.2)
SODIUM SERPL-SCNC: 145 MMOL/L (ref 135–145)
SYSTOLIC BLOOD PRESSURE - MUSE: NORMAL MMHG
T AXIS - MUSE: -88 DEGREES
VENTRICULAR RATE- MUSE: 75 BPM
WBC # BLD AUTO: 5.2 10E3/UL (ref 4–11)

## 2024-01-22 PROCEDURE — 250N000013 HC RX MED GY IP 250 OP 250 PS 637: Performed by: INTERNAL MEDICINE

## 2024-01-22 PROCEDURE — 88305 TISSUE EXAM BY PATHOLOGIST: CPT | Mod: 26 | Performed by: PATHOLOGY

## 2024-01-22 PROCEDURE — 84132 ASSAY OF SERUM POTASSIUM: CPT | Performed by: INTERNAL MEDICINE

## 2024-01-22 PROCEDURE — 99232 SBSQ HOSP IP/OBS MODERATE 35: CPT | Performed by: INTERNAL MEDICINE

## 2024-01-22 PROCEDURE — 97535 SELF CARE MNGMENT TRAINING: CPT | Mod: GO

## 2024-01-22 PROCEDURE — 80048 BASIC METABOLIC PNL TOTAL CA: CPT | Performed by: INTERNAL MEDICINE

## 2024-01-22 PROCEDURE — 97110 THERAPEUTIC EXERCISES: CPT | Mod: GP

## 2024-01-22 PROCEDURE — 84100 ASSAY OF PHOSPHORUS: CPT | Performed by: INTERNAL MEDICINE

## 2024-01-22 PROCEDURE — 36415 COLL VENOUS BLD VENIPUNCTURE: CPT | Performed by: INTERNAL MEDICINE

## 2024-01-22 PROCEDURE — 83735 ASSAY OF MAGNESIUM: CPT | Performed by: INTERNAL MEDICINE

## 2024-01-22 PROCEDURE — 250N000011 HC RX IP 250 OP 636: Performed by: INTERNAL MEDICINE

## 2024-01-22 PROCEDURE — 210N000001 HC R&B IMCU HEART CARE

## 2024-01-22 PROCEDURE — 85018 HEMOGLOBIN: CPT | Performed by: INTERNAL MEDICINE

## 2024-01-22 PROCEDURE — 250N000013 HC RX MED GY IP 250 OP 250 PS 637: Performed by: PHYSICIAN ASSISTANT

## 2024-01-22 PROCEDURE — 258N000003 HC RX IP 258 OP 636: Performed by: INTERNAL MEDICINE

## 2024-01-22 PROCEDURE — 97116 GAIT TRAINING THERAPY: CPT | Mod: GP

## 2024-01-22 PROCEDURE — 85027 COMPLETE CBC AUTOMATED: CPT | Performed by: INTERNAL MEDICINE

## 2024-01-22 RX ORDER — BUDESONIDE 3 MG/1
9 CAPSULE, COATED PELLETS ORAL DAILY
Status: DISCONTINUED | OUTPATIENT
Start: 2024-01-22 | End: 2024-01-24 | Stop reason: HOSPADM

## 2024-01-22 RX ORDER — MAGNESIUM SULFATE HEPTAHYDRATE 40 MG/ML
2 INJECTION, SOLUTION INTRAVENOUS ONCE
Status: COMPLETED | OUTPATIENT
Start: 2024-01-22 | End: 2024-01-22

## 2024-01-22 RX ORDER — POTASSIUM CHLORIDE 1500 MG/1
20 TABLET, EXTENDED RELEASE ORAL ONCE
Status: COMPLETED | OUTPATIENT
Start: 2024-01-22 | End: 2024-01-22

## 2024-01-22 RX ORDER — POTASSIUM CHLORIDE 1500 MG/1
40 TABLET, EXTENDED RELEASE ORAL ONCE
Status: COMPLETED | OUTPATIENT
Start: 2024-01-22 | End: 2024-01-22

## 2024-01-22 RX ADMIN — ACETAMINOPHEN AND CODEINE PHOSPHATE 1 TABLET: 300; 30 TABLET ORAL at 23:37

## 2024-01-22 RX ADMIN — GABAPENTIN 300 MG: 300 CAPSULE ORAL at 21:12

## 2024-01-22 RX ADMIN — MAGNESIUM SULFATE HEPTAHYDRATE 2 G: 40 INJECTION, SOLUTION INTRAVENOUS at 06:49

## 2024-01-22 RX ADMIN — PANTOPRAZOLE SODIUM 40 MG: 40 TABLET, DELAYED RELEASE ORAL at 06:03

## 2024-01-22 RX ADMIN — ACETAMINOPHEN AND CODEINE PHOSPHATE 1 TABLET: 300; 30 TABLET ORAL at 05:16

## 2024-01-22 RX ADMIN — TOPIRAMATE 50 MG: 25 TABLET, FILM COATED ORAL at 21:12

## 2024-01-22 RX ADMIN — MECLIZINE 12.5 MG: 12.5 TABLET ORAL at 21:12

## 2024-01-22 RX ADMIN — LOPERAMIDE HYDROCHLORIDE 2 MG: 2 CAPSULE ORAL at 08:19

## 2024-01-22 RX ADMIN — POTASSIUM CHLORIDE 20 MEQ: 1500 TABLET, EXTENDED RELEASE ORAL at 08:10

## 2024-01-22 RX ADMIN — BUDESONIDE 9 MG: 3 CAPSULE ORAL at 11:59

## 2024-01-22 RX ADMIN — SODIUM CHLORIDE: 9 INJECTION, SOLUTION INTRAVENOUS at 17:19

## 2024-01-22 RX ADMIN — HYDROXYZINE HYDROCHLORIDE 10 MG: 10 TABLET ORAL at 03:00

## 2024-01-22 RX ADMIN — ACETAMINOPHEN AND CODEINE PHOSPHATE 1 TABLET: 300; 30 TABLET ORAL at 11:19

## 2024-01-22 RX ADMIN — MIRTAZAPINE 15 MG: 15 TABLET, FILM COATED ORAL at 21:12

## 2024-01-22 RX ADMIN — ACETAMINOPHEN AND CODEINE PHOSPHATE 1 TABLET: 300; 30 TABLET ORAL at 17:19

## 2024-01-22 RX ADMIN — POTASSIUM CHLORIDE 20 MEQ: 1500 TABLET, EXTENDED RELEASE ORAL at 16:00

## 2024-01-22 RX ADMIN — PANTOPRAZOLE SODIUM 40 MG: 40 TABLET, DELAYED RELEASE ORAL at 16:00

## 2024-01-22 RX ADMIN — HYDROXYZINE HYDROCHLORIDE 10 MG: 10 TABLET ORAL at 08:19

## 2024-01-22 RX ADMIN — LEVETIRACETAM 750 MG: 500 TABLET, FILM COATED ORAL at 08:09

## 2024-01-22 RX ADMIN — POTASSIUM CHLORIDE 40 MEQ: 1500 TABLET, EXTENDED RELEASE ORAL at 06:00

## 2024-01-22 RX ADMIN — MECLIZINE 12.5 MG: 12.5 TABLET ORAL at 08:09

## 2024-01-22 RX ADMIN — LOPERAMIDE HYDROCHLORIDE 2 MG: 2 CAPSULE ORAL at 11:59

## 2024-01-22 RX ADMIN — QUETIAPINE FUMARATE 50 MG: 25 TABLET ORAL at 21:12

## 2024-01-22 RX ADMIN — NORTRIPTYLINE HYDROCHLORIDE 50 MG: 50 CAPSULE ORAL at 21:12

## 2024-01-22 RX ADMIN — LEVETIRACETAM 750 MG: 500 TABLET, FILM COATED ORAL at 21:12

## 2024-01-22 RX ADMIN — OXCARBAZEPINE 450 MG: 150 TABLET, FILM COATED ORAL at 08:09

## 2024-01-22 RX ADMIN — GABAPENTIN 100 MG: 100 CAPSULE ORAL at 08:09

## 2024-01-22 ASSESSMENT — ACTIVITIES OF DAILY LIVING (ADL)
ADLS_ACUITY_SCORE: 51
ADLS_ACUITY_SCORE: 51
ADLS_ACUITY_SCORE: 50
ADLS_ACUITY_SCORE: 51
ADLS_ACUITY_SCORE: 51
ADLS_ACUITY_SCORE: 50
ADLS_ACUITY_SCORE: 50

## 2024-01-22 NOTE — PLAN OF CARE
Problem: Electrolyte Imbalance  Goal: Electrolyte Balance  1/22/2024 0535 by Jessica Virk RN  Outcome: Not Progressing     Problem: Fatigue  Goal: Improved Activity Tolerance  Outcome: Progressing  Intervention: Promote Improved Energy  Recent Flowsheet Documentation  Taken 1/22/2024 0512 by Jessica Virk RN  Activity Management:   activity adjusted per tolerance   up to bedside commode  Taken 1/22/2024 0005 by Jessica Virk RN  Activity Management:   activity adjusted per tolerance   up to bedside commode     Problem: Fall Injury Risk  Goal: Absence of Fall and Fall-Related Injury  Intervention: Identify and Manage Contributors  Recent Flowsheet Documentation  Taken 1/22/2024 0512 by Jessica Virk RN  Medication Review/Management: medications reviewed  Taken 1/22/2024 0005 by Jessica Virk RN  Medication Review/Management: medications reviewed  Intervention: Promote Injury-Free Environment  Recent Flowsheet Documentation  Taken 1/22/2024 0512 by Jessica Virk RN  Safety Promotion/Fall Prevention: increased rounding and observation  Taken 1/22/2024 0005 by Jessica Virk RN  Safety Promotion/Fall Prevention: increased rounding and observation     Goal Outcome Evaluation:         Had 1 small brown loose incontinent stool tonight. Delta hemoglobin change from 13.9 to 10.6 this morning. No sign and symptoms of bleeding noted. Complaint of left facial pain, pain pill given. Rated pain high at 9 and just go down to 8 after intervention. Assist of 1 to beside commode. Potassium of 2.6 this morning. On potassium protocol.

## 2024-01-22 NOTE — PROGRESS NOTES
[facility] Progress Note       SUBJECTIVE:  Patient reports she ate a good breakfast this morning, no nausea or abdominal pain. Stools are still loose but seem to be better than they were.        OBJECTIVE:  /61 (BP Location: Right arm)   Pulse 83   Temp 98.4  F (36.9  C) (Oral)   Resp 20   Wt 49.1 kg (108 lb 3.9 oz)   SpO2 90%   BMI 17.47 kg/m    Temp (24hrs), Av.2  F (36.8  C), Min:97.8  F (36.6  C), Max:98.6  F (37  C)    Patient Vitals for the past 72 hrs:   Weight   24 045 49.1 kg (108 lb 3.9 oz)   24 042 48.7 kg (107 lb 5.8 oz)   24 044 52.7 kg (116 lb 2.9 oz)       Intake/Output Summary (Last 24 hours) at 2024 1114  Last data filed at 2024 0837  Gross per 24 hour   Intake 2509 ml   Output 1100 ml   Net 1409 ml        PHYSICAL EXAM  GEN: NAD, female appears older than stated age sitting up in bed  HRT: no LE edema  RESP: unlabored  ABD: soft, nontender  SKIN: No rash or jaundice      Additional Data:  I have reviewed the patient's new clinical lab results:     Recent Labs   Lab Test 24  0430 24  0429 22  0559 22  1414 22  0408 22  2231 22  0923 22  2255   WBC 5.2 6.2 5.6   < >  --    < > 6.8   < >  --    HGB 10.6* 13.9 12.1   < >  --    < > 9.6*   < >  --    MCV 84 86 86   < >  --    < > 73*   < >  --     438 151   < >  --    < > 547*   < >  --    INR  --   --   --   --  1.34*  --  1.13  --  1.36*    < > = values in this interval not displayed.     Recent Labs   Lab Test 24  0430 24  1443 24  0429   POTASSIUM 2.6* 3.7 3.5 2.8*   CHLORIDE 107 102  --  108*   CO2 30* 26  --  33*   BUN 2.6* 1.8*  --  2.3*   ANIONGAP 8 11  --  3*     Recent Labs   Lab Test 24  1155 24  1055 10/22/23  1105 23  1906 23  1721 23  0629 23  0407 22  2017 22  1901   ALBUMIN  --  3.3* 3.6  --  3.6   < >  --    < > 3.6   BILITOTAL   --  <0.2 0.2  --  <0.2   < >  --    < > 0.5   ALT  --  8 15  --  12   < >  --    < > 31   AST  --  12 16  --  20   < >  --    < > 25   PROTEIN 30*  --   --  Negative  --   --  10*   < >  --    LIPASE  --  7* 6*  --   --   --   --   --  <9    < > = values in this interval not displayed.     Tissue transglutaminase IgA 1/18/24: 2.1  Enteric bacteria/virus stool and C diff stool 1/27/24: negative    Imaging results:  CT abdomen/pelvis w/o 1/27/24:  FINDINGS:   LOWER CHEST: Dependent atelectasis in the right middle and lower lobe.  HEPATOBILIARY: Normal unenhanced liver contours. Gallbladder is present.  PANCREAS: Unremarkable.  SPLEEN: Unremarkable.  ADRENAL GLANDS: Unremarkable.  KIDNEYS/BLADDER: Normal unenhanced renal contours. No urolithiasis or hydronephrosis. Normal bladder contours.  BOWEL: No bowel obstruction. Liquid stool throughout the colon. No evidence of toxic megacolon. No free fluid or free air.  LYMPH NODES: No lymphadenopathy.  VASCULATURE: Abdominal aorta is nonaneurysmal with severe atheromatous disease.  PELVIC ORGANS: Hysterectomy.  MUSCULOSKELETAL: No aggressive osseous lesions.                                                                   IMPRESSION:   1.  Liquid stool throughout the colon, nonspecific but can be seen in setting of enteritis/colitis.  2.  Additional details in the findings.     CT head 1/27/24:  IMPRESSION:  1.  Stable compared to previous head CT 10/22/2023.  2.  No intracranial hemorrhage, mass lesions, hydrocephalus or CT evidence for an acute infarct.  3.  Moderate-sized area of encephalomalacia involving the right cerebellar hemisphere. Small encephalomalacia in the left cerebellar hemisphere.   4.  Mild diffuse cerebral parenchymal volume loss. Presumed chronic hypertensive/microvascular ischemic white matter changes.      Procedure results:  Colonoscopy 1/19/24 ([facility]):  Findings:       The terminal ileum appeared normal.        A few small-mouthed diverticula were  found in the sigmoid colon.        The colon otherwise appeared normal. Random colon biopsies for histology        were taken from the colon for evaluation of microscopic colitis.        Impression:     - Chronic diarrhea                          - Personal history of polyp                          - Mild diverticulosis   Recommendation:        - High fiber / lactose free diet.                          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 TSH WNL 2 months ago, Infectious stool studies negative, tTG IgA negative. Colonoscopy unremarkable 1/19 but pathology positive for collagenous colitis. She has reported some improvement in diarrhea since arrival but given ongoing loose stools with hypokalemia will start budesonide for treatment.     PLAN:  - Start budesonide 9 mg daily- take 9 mg daily x 6 weeks, 6 mg daily x 2 weeks, then 3 mg daily x 2 weeks  - Okay to continue Imodium prn   - Regular diet as tolerated  - Electrolyte monitoring/replacement per medicine      (Dr. Joseph)  Bety Brumfield PA-C  [facility]  1/22/2024 11:14 AM  [phone] (office)    35 minutes of total time was spent providing patient care, including patient evaluation, reviewing documentation/test results, , and documentation.  ________________________________________________________________________

## 2024-01-22 NOTE — PLAN OF CARE
A&O x :4, forgetful at times.     Activity:Assist of 1 with walker  and gait belt. Pt needs encouragement to turn and reposition- red/blanchable buttocks and heels.   Cardiac Rhythm: NSR     Pain:Chronic back and facial pain 2/2 dentition- taking Tylenol # 3.  Pain consistently in the 8-9/10 range.  Pain upon reassessment: 6/10.    Plan: Pt to discharge to TCU once electrolytes are corrected.  Phos protocol- check in the AM.  K: 3.7, recheck in the AM.      Problem: Diarrhea  Goal: Effective Diarrhea Management  1/22/2024 1758 by Marika Merchant RN  Outcome: Progressing   Pt had 1 loose stool on the pm which brings the total to 3 today.     Enteric and Contact precautions in place.

## 2024-01-22 NOTE — PLAN OF CARE
Problem: Electrolyte Imbalance  Goal: Electrolyte Balance  Outcome: Not Progressing     Problem: Fatigue  Goal: Improved Activity Tolerance  Outcome: Not Progressing     Goal Outcome Evaluation:    Vital signs stable. C/o chronic L face pain 9/10, medicated with PRN tylenol #3. Small, loose/mucousy stool x 2. Activity causes stool incontinence. Patient ambulated 10 ft into brown and back to bed with walker and assist x 1. Patient weak and has very little endurance. Encouraged patient to sit up in chair more but patient requests to go back to bed after short time. Remains on NS at 50 ml/hr.    K+ replaced this AM. Recheck K+3.2. K+ to replaced again per protocol. Plan to discharge to TCU when medically ready.

## 2024-01-22 NOTE — PLAN OF CARE
Problem: Adult Inpatient Plan of Care  Goal: Plan of Care Review  Description: The Plan of Care Review/Shift note should be completed every shift.  The Outcome Evaluation is a brief statement about your assessment that the patient is improving, declining, or no change.  This information will be displayed automatically on your shift  note.  Outcome: Progressing  Flowsheets (Taken 1/21/2024 2110)  Plan of Care Reviewed With: patient     Problem: Electrolyte Imbalance  Goal: Electrolyte Balance  Outcome: Progressing     Problem: Fatigue  Goal: Improved Activity Tolerance  Outcome: Progressing  Intervention: Promote Improved Energy  Recent Flowsheet Documentation  Taken 1/21/2024 2000 by Maggie Henderson, RN  Activity Management:   activity encouraged   activity adjusted per tolerance  Taken 1/21/2024 1551 by Maggie Henderson, RN  Activity Management:   activity encouraged   activity adjusted per tolerance   Goal Outcome Evaluation:      Plan of Care Reviewed With: patient    Pt alert and oriented time 4. Vitals stable. C/o generalized back pain and requested for tylenol #3. Gave prn tylenol #3 at 1701 with some relief. Pt had only 2 small soft bm this shift. She was up to commode with assist of 1 and tolerated well. Denied any SOB, headache or fatigue. Pt on phos and K protocol. Recheck phos after replacement was 3.1. recheck labs in am per protocol for both phos and K. Tele NSR. Pt has stage 1 PU on her left buttock. Cleaned and changed new mepilex. Pt has IV fluid running at 50 ml/hr.

## 2024-01-22 NOTE — PROGRESS NOTES
"Care Management Follow Up    Length of Stay (days): 5    Expected Discharge Date: 01/22/2024     Concerns to be Addressed: discharge planning     Patient plan of care discussed at interdisciplinary rounds: Yes    Anticipated Discharge Disposition:  Transitional care - [facility]     Anticipated Discharge Services:  Transitional care - [facility]  Anticipated Discharge DME:  NA    Patient/family educated on Medicare website which has current facility and service quality ratings:  NA  Education Provided on the Discharge Plan:  Yes per team  Patient/Family in Agreement with the Plan:  Yes    Referrals Placed by CM/SW:  Yes  Private pay costs discussed: Not applicable    Additional Information:  Called and left a message for [facility], patient may not discharge today d/t low K.    Social Hx: \"Assessed. Accepted at [facility]. [employer] auth received. Lives in a house alone. Independent with ADLs and gets help with IADLs. [facility] PT services. [facility] transport.\"    RNCM to follow for medical progression, recommendations, and final discharge plan.     Carol Merchant RN     Per provider, Dr. Paniagua, patient not ready. K is too low. Stools are forming and eating better.  "

## 2024-01-22 NOTE — PROGRESS NOTES
[facility]    Medicine Progress Note - Hospitalist Service    Date of Admission:  1/17/2024    Assessment & Plan   Julisa Chaney is a 78 year old female admitted on 1/17/2024. She has a hx of chronic pain, seizures, who has had diarrhea for months, comes in with dehydration, concern for hypotension, possible sepsis. She was seen by GI and had a colonoscopy here. Her biopsy came back on 1/22/24 as Collagenous colitis     1.Hypotension, weakness on admit, dehydration  -concern for possible sepsis, vs dehydration, hypokalemia  -I am more concerned now about ongoing dehydration, hypokalemia, from severe GI losses  -ivf , has helped bp  -in ER got Cefepime+ vanco to cover sepsis--was on these til 1/20  - cx NTD--now as of 1/21 of antibiotics  -check stool cx, neg for c diff  -tele  -check cortisol-ok  -check procal-normal   -check blood cx-so far NTD    2.Diarrhea--now Dx Collagenous colitis  -for months, 7 times/24 hours at home--slowed down here  -was to see GI in clinic 2/20  -appreciate GI seeing here, sent of Celiac studies, and  colonoscopy here  -send stools cx-ntd  -ivf  -no sign of bleeding  -microscopic colitis fits  with prior norovirus just before this onset , also on ppi  -1/19 colonoscopy--BX proven Collagenous colitis   -diet now per GI high fiber, lactose free   -1/22 after bx back GI starting taper steroids budesonide 9 mg daily- take 9 mg daily x 6 weeks, 6 mg daily x 2 weeks, then 3 mg daily x 2 weeks     3.Hypokalemia-severe, K-still issue here  -refused po k at first, now more ok taking   -tele    4.Hypernatremia, 146 -->144-->139 -->145  -suspect due to dehydration on admit  -IVF wean NS  -replete volume  -improved    5.Hx of Sz  -on meds    6.hx of gerd  -on ppi    7.Failure to thrive--from ongoing stool issues  -lives alone  -will need Pt/OT eval- needs TCU--PT said improving, but still needs TCU now considered 1 assist vs 2 assist chair to bed    8.Hypomag  -another  dose iv mag today    9.Hypophos  -replaced    10.Anemia  -no sign of loss, but with tremendous stools, possible  -repeat hgb later today  -had colon this admit    11.Severe malnutrition, BMI<18.5  -likely from months of loose stools  -addressing above  -supportive measures    12.DVT prevent- scd    13.code-full    Dispo- looking for TCU once lytes stable    I called daughter to update and give biopsy results and plan at 1238              Diet: Snacks/Supplements Adult: Other; PRN; With Meals  High Fiber Diet  Snacks/Supplements Adult: Sarika Brooke Standard Oral Supplement; Between Meals    DVT Prophylaxis: Heparin SQ  Abrams Catheter: Not present  Lines: None     Cardiac Monitoring: ACTIVE order. Indication: QTc prolonging medication (48 hours)  Code Status: Full Code      Clinically Significant Risk Factors        # Hypokalemia: Lowest K = 2.6 mmol/L in last 2 days, will replace as needed       # Hypoalbuminemia: Lowest albumin = 3.3 g/dL at 1/17/2024 10:55 AM, will monitor as appropriate             # Severe Malnutrition: based on nutrition assessment           Disposition Plan      Expected Discharge Date: 01/23/2024      Destination: inpatient rehabilitation facility  Discharge Comments: Accepted at [facility]  K too low            Laxmi Paniagua MD  Hospitalist Service  [facility]  Securely message with [email or web address] (more info)  Text page via [employer] Paging/Directory   ______________________________________________________________________    Interval History   She thinks stools better, loose but not watery  She does not thinks bloody  Eating more  No abd pain today    Physical Exam   Vital Signs: Temp: 98.3  F (36.8  C) Temp src: Oral BP: 107/55 Pulse: 83   Resp: 20 SpO2: 93 % O2 Device: None (Room air)    Weight: 108 lbs 3.93 oz    Constitutional: awake, alert, cooperative, and no apparent distress  Respiratory: no increased work of breathing, good air exchange, no retractions, and  clear to auscultation  Cardiovascular: regular rate and rhythm and normal S1 and S2  GI: normal bowel sounds, soft, non-distended, and non-tender  Skin: no bruising or bleeding  Musculoskeletal: no lower extremity pitting edema present  Neurologic: Mental Status Exam:  Level of Alertness:   awake  Neuropsychiatric: General: normal, calm, and normal eye contact    Medical Decision Making       35 MINUTES SPENT BY ME on the date of service doing chart review, history, exam, documentation & further activities per the note.      Data     I have personally reviewed the following data over the past 24 hrs:    5.2  \   10.6 (L)   / 414     145 107 2.6 (L) /  75   2.6 (LL) 30 (H) 0.55 \       Imaging results reviewed over the past 24 hrs:   No results found for this or any previous visit (from the past 24 hour(s)).

## 2024-01-23 ENCOUNTER — APPOINTMENT (OUTPATIENT)
Dept: OCCUPATIONAL THERAPY | Facility: HOSPITAL | Age: 79
DRG: 391 | End: 2024-01-23
Payer: COMMERCIAL

## 2024-01-23 LAB
ANION GAP SERPL CALCULATED.3IONS-SCNC: 3 MMOL/L (ref 7–15)
BUN SERPL-MCNC: 2.9 MG/DL (ref 8–23)
CALCIUM SERPL-MCNC: 7.7 MG/DL (ref 8.8–10.2)
CHLORIDE SERPL-SCNC: 110 MMOL/L (ref 98–107)
CREAT SERPL-MCNC: 0.53 MG/DL (ref 0.51–0.95)
DEPRECATED HCO3 PLAS-SCNC: 33 MMOL/L (ref 22–29)
EGFRCR SERPLBLD CKD-EPI 2021: >90 ML/MIN/1.73M2
ERYTHROCYTE [DISTWIDTH] IN BLOOD BY AUTOMATED COUNT: 15.4 % (ref 10–15)
GLUCOSE SERPL-MCNC: 77 MG/DL (ref 70–99)
HCT VFR BLD AUTO: 34.6 % (ref 35–47)
HGB BLD-MCNC: 10.4 G/DL (ref 11.7–15.7)
MCH RBC QN AUTO: 25.6 PG (ref 26.5–33)
MCHC RBC AUTO-ENTMCNC: 30.1 G/DL (ref 31.5–36.5)
MCV RBC AUTO: 85 FL (ref 78–100)
PHOSPHATE SERPL-MCNC: 2.3 MG/DL (ref 2.5–4.5)
PLATELET # BLD AUTO: 408 10E3/UL (ref 150–450)
POTASSIUM SERPL-SCNC: 3.2 MMOL/L (ref 3.4–5.3)
POTASSIUM SERPL-SCNC: 3.3 MMOL/L (ref 3.4–5.3)
POTASSIUM SERPL-SCNC: 3.7 MMOL/L (ref 3.4–5.3)
RBC # BLD AUTO: 4.06 10E6/UL (ref 3.8–5.2)
SODIUM SERPL-SCNC: 146 MMOL/L (ref 135–145)
WBC # BLD AUTO: 5.6 10E3/UL (ref 4–11)

## 2024-01-23 PROCEDURE — 36415 COLL VENOUS BLD VENIPUNCTURE: CPT | Performed by: INTERNAL MEDICINE

## 2024-01-23 PROCEDURE — 120N000004 HC R&B MS OVERFLOW

## 2024-01-23 PROCEDURE — 85027 COMPLETE CBC AUTOMATED: CPT | Performed by: INTERNAL MEDICINE

## 2024-01-23 PROCEDURE — 84100 ASSAY OF PHOSPHORUS: CPT | Performed by: INTERNAL MEDICINE

## 2024-01-23 PROCEDURE — 250N000013 HC RX MED GY IP 250 OP 250 PS 637: Performed by: INTERNAL MEDICINE

## 2024-01-23 PROCEDURE — 84132 ASSAY OF SERUM POTASSIUM: CPT | Performed by: HOSPITALIST

## 2024-01-23 PROCEDURE — 99232 SBSQ HOSP IP/OBS MODERATE 35: CPT | Performed by: INTERNAL MEDICINE

## 2024-01-23 PROCEDURE — 36415 COLL VENOUS BLD VENIPUNCTURE: CPT | Performed by: HOSPITALIST

## 2024-01-23 PROCEDURE — 84132 ASSAY OF SERUM POTASSIUM: CPT | Performed by: INTERNAL MEDICINE

## 2024-01-23 PROCEDURE — 250N000013 HC RX MED GY IP 250 OP 250 PS 637: Performed by: PHYSICIAN ASSISTANT

## 2024-01-23 PROCEDURE — 258N000003 HC RX IP 258 OP 636: Performed by: INTERNAL MEDICINE

## 2024-01-23 PROCEDURE — 97535 SELF CARE MNGMENT TRAINING: CPT | Mod: GO

## 2024-01-23 PROCEDURE — 80048 BASIC METABOLIC PNL TOTAL CA: CPT | Performed by: INTERNAL MEDICINE

## 2024-01-23 PROCEDURE — 250N000009 HC RX 250: Performed by: INTERNAL MEDICINE

## 2024-01-23 RX ORDER — POTASSIUM CHLORIDE 1500 MG/1
20 TABLET, EXTENDED RELEASE ORAL ONCE
Status: COMPLETED | OUTPATIENT
Start: 2024-01-23 | End: 2024-01-23

## 2024-01-23 RX ADMIN — LEVETIRACETAM 750 MG: 500 TABLET, FILM COATED ORAL at 20:50

## 2024-01-23 RX ADMIN — POTASSIUM CHLORIDE 20 MEQ: 1500 TABLET, EXTENDED RELEASE ORAL at 15:08

## 2024-01-23 RX ADMIN — OXCARBAZEPINE 450 MG: 150 TABLET, FILM COATED ORAL at 08:40

## 2024-01-23 RX ADMIN — HYDROXYZINE HYDROCHLORIDE 10 MG: 10 TABLET ORAL at 02:33

## 2024-01-23 RX ADMIN — SODIUM PHOSPHATE, MONOBASIC, MONOHYDRATE AND SODIUM PHOSPHATE, DIBASIC, ANHYDROUS 9 MMOL: 142; 276 INJECTION, SOLUTION INTRAVENOUS at 08:40

## 2024-01-23 RX ADMIN — SODIUM CHLORIDE: 9 INJECTION, SOLUTION INTRAVENOUS at 17:28

## 2024-01-23 RX ADMIN — TOPIRAMATE 50 MG: 25 TABLET, FILM COATED ORAL at 21:45

## 2024-01-23 RX ADMIN — NORTRIPTYLINE HYDROCHLORIDE 50 MG: 50 CAPSULE ORAL at 21:46

## 2024-01-23 RX ADMIN — MECLIZINE 12.5 MG: 12.5 TABLET ORAL at 20:50

## 2024-01-23 RX ADMIN — BUDESONIDE 9 MG: 3 CAPSULE ORAL at 08:41

## 2024-01-23 RX ADMIN — QUETIAPINE FUMARATE 50 MG: 25 TABLET ORAL at 21:45

## 2024-01-23 RX ADMIN — PANTOPRAZOLE SODIUM 40 MG: 40 TABLET, DELAYED RELEASE ORAL at 16:59

## 2024-01-23 RX ADMIN — PANTOPRAZOLE SODIUM 40 MG: 40 TABLET, DELAYED RELEASE ORAL at 06:06

## 2024-01-23 RX ADMIN — POTASSIUM CHLORIDE 20 MEQ: 1500 TABLET, EXTENDED RELEASE ORAL at 08:41

## 2024-01-23 RX ADMIN — MIRTAZAPINE 15 MG: 15 TABLET, FILM COATED ORAL at 21:45

## 2024-01-23 RX ADMIN — ACETAMINOPHEN AND CODEINE PHOSPHATE 1 TABLET: 300; 30 TABLET ORAL at 16:59

## 2024-01-23 RX ADMIN — LEVETIRACETAM 750 MG: 500 TABLET, FILM COATED ORAL at 08:40

## 2024-01-23 RX ADMIN — GABAPENTIN 300 MG: 300 CAPSULE ORAL at 21:45

## 2024-01-23 RX ADMIN — MECLIZINE 12.5 MG: 12.5 TABLET ORAL at 08:41

## 2024-01-23 RX ADMIN — ACETAMINOPHEN AND CODEINE PHOSPHATE 1 TABLET: 300; 30 TABLET ORAL at 06:05

## 2024-01-23 RX ADMIN — GABAPENTIN 100 MG: 100 CAPSULE ORAL at 08:41

## 2024-01-23 ASSESSMENT — ACTIVITIES OF DAILY LIVING (ADL)
ADLS_ACUITY_SCORE: 51
ADLS_ACUITY_SCORE: 50
ADLS_ACUITY_SCORE: 51
ADLS_ACUITY_SCORE: 50
ADLS_ACUITY_SCORE: 51
ADLS_ACUITY_SCORE: 50

## 2024-01-23 NOTE — PROGRESS NOTES
[facility]    Medicine Progress Note - Hospitalist Service    Date of Admission:  1/17/2024    Assessment & Plan     Julisa Chaney is a 78 year old female presented for evaluation of chronic diarrhea. Admitted on 1/17/2024 for hypotension, dehydration and electrolyte abnormalities. Now found to have Collagenous colitis.     Hypotension, dehydration:   Due to diarrhea. On admission, concerning for sepsis, received Cefepime+ vanco, stopped 1/20.   Blood culture no growth.   Hypotension now resolved after hydration.    Collagenous colitis:  Presents with diarrhea for months, up to 7 times a day.   Stool culture has no growth  Done colonoscope 1/19. Biopsy showed Collagenous colitis   - Continue high fiber, lactose free diet  - Continue steroid taper per GI: budesonide 9 mg daily x 6 weeks, 6 mg daily x 2 weeks, then 3 mg daily x 2 weeks   - Imodium prn  - Outpatient [place] follow up in 3 months     Hypokalemia: Potassium low at 2.6. Now improved. Continue to replace.     Hypernatremia: Sodium mildly high at 146, due to dehydration. Encourage oral fluid intake.     Hypomagnesemia: Low at 1.6. Returned to normal after replacement.     Hypophosphatemia: Low at 1.7. Currently improved. Continue to replace.    Seizure disorder: On Keppra and trileptal.    Anemia: Hemoglobin decreased to the level of 10.5 for the past few days. No signs of bleeding. Likely due to dilutional.     GERD: on ppi    Generalized weakness: PT/OT    Severe malnutrition: BMI<18.5  - Dietary supplements per dietitian.      Discharge planning:  Plan to discharge to TCU tomorrow if electrolytes improve.     Plan of care discussed with her daughter, Alissa.         Diet: Snacks/Supplements Adult: Other; PRN; With Meals  High Fiber Diet  Snacks/Supplements Adult: Ensure Clear; Between Meals    DVT Prophylaxis: Pneumatic Compression Devices  Abrams Catheter: Not present  Lines: None     Cardiac Monitoring: ACTIVE order. Indication:  QTc prolonging medication (48 hours)  Code Status: Full Code      Clinically Significant Risk Factors        # Hypokalemia: Lowest K = 2.6 mmol/L in last 2 days, will replace as needed  # Hypernatremia: Highest Na = 146 mmol/L in last 2 days, will monitor as appropriate      # Hypoalbuminemia: Lowest albumin = 3.3 g/dL at 1/17/2024 10:55 AM, will monitor as appropriate             # Severe Malnutrition: based on nutrition assessment           Disposition Plan      Expected Discharge Date: 01/24/2024      Destination: inpatient rehabilitation facility  Discharge Comments: Accepted at [facility]; [facility] Transport needed  K too low            Dhruv Mcarthur MD  Hospitalist Service  [facility]  Securely message with [email or web address] (more info)  Text page via [employer] Paging/Directory   ______________________________________________________________________    Interval History   Patient reports that her diarrhea has significantly improved. She had 3 bowel movements yesterday. The stool has become more formed. No abdominal pain She feels that she can eat better.     Physical Exam   Vital Signs: Temp: 98.3  F (36.8  C) Temp src: Oral BP: 102/53 Pulse: 78   Resp: 18 SpO2: 91 % O2 Device: None (Room air)    Weight: 108 lbs 3.93 oz    General appearance: not in acute distress  HEENT: PERRL, EOMI  Lungs: Clear breath sounds in bilateral lung fields  Cardiovascular: Regular rate and rhythm, normal S1-S2  Abdomen: Soft, non tender, no distension  Musculoskeletal: No joint swelling  Skin: No rash and no edema  Neurology: AAO ×3.  Cranial nerves II - XII normal.  Normal muscle strength in all four extremities.     Medical Decision Making       46 MINUTES SPENT BY ME on the date of service doing chart review, history, exam, documentation & further activities per the note.      Data     I have personally reviewed the following data over the past 24 hrs:    5.6  \   10.4 (L)   / 408     146 (H) 110 (H) 2.9 (L)  /  77   3.2 (L) 33 (H) 0.53 \       Imaging results reviewed over the past 24 hrs:   No results found for this or any previous visit (from the past 24 hour(s)).

## 2024-01-23 NOTE — PROGRESS NOTES
[facility] Progress Note       SUBJECTIVE:  Patient thinks her stools are better. Per chart she went three times yesterday. Continues to eat well without abdominal pain or nausea.       OBJECTIVE:  /64 (BP Location: Right arm)   Pulse 73   Temp 98.3  F (36.8  C) (Oral)   Resp 18   Wt 49.1 kg (108 lb 3.9 oz)   SpO2 94%   BMI 17.47 kg/m    Temp (24hrs), Av.2  F (36.8  C), Min:97.8  F (36.6  C), Max:98.6  F (37  C)    Patient Vitals for the past 72 hrs:   Weight   24 0458 49.1 kg (108 lb 3.9 oz)   24 0426 48.7 kg (107 lb 5.8 oz)       Intake/Output Summary (Last 24 hours) at 2024 1114  Last data filed at 2024 0837  Gross per 24 hour   Intake 2509 ml   Output 1100 ml   Net 1409 ml        PHYSICAL EXAM  GEN: NAD, female appears older than stated age sitting up in bed eating breakfast  HRT: no LE edema  RESP: unlabored  ABD: nondistended  SKIN: No rash or jaundice      Additional Data:  I have reviewed the patient's new clinical lab results:     Recent Labs   Lab Test 24  1303 244 24  0430 22  0559 22  1414 22  0408 22  2231 22  0923 22  2255   WBC 5.6  --  5.2 6.2   < >  --    < > 6.8   < >  --    HGB 10.4* 10.8* 10.6* 13.9   < >  --    < > 9.6*   < >  --    MCV 85  --  84 86   < >  --    < > 73*   < >  --      --  414 438   < >  --    < > 547*   < >  --    INR  --   --   --   --   --  1.34*  --  1.13  --  1.36*    < > = values in this interval not displayed.     Recent Labs   Lab Test 24  1303 24  0454 24  0430   POTASSIUM 3.3* 3.7 3.2* 2.6* 3.7   CHLORIDE 110*  --   --  107 102   CO2 33*  --   --  30* 26   BUN 2.9*  --   --  2.6* 1.8*   ANIONGAP 3*  --   --  8 11     Recent Labs   Lab Test 24  1155 24  1055 10/22/23  1105 23  1906 23  1721 23  0629 23  0407 22  1901   ALBUMIN  --  3.3*  3.6  --  3.6   < >  --    < > 3.6   BILITOTAL  --  <0.2 0.2  --  <0.2   < >  --    < > 0.5   ALT  --  8 15  --  12   < >  --    < > 31   AST  --  12 16  --  20   < >  --    < > 25   PROTEIN 30*  --   --  Negative  --   --  10*   < >  --    LIPASE  --  7* 6*  --   --   --   --   --  <9    < > = values in this interval not displayed.     Tissue transglutaminase IgA 1/18/24: 2.1  Enteric bacteria/virus stool and C diff stool 1/27/24: negative    Imaging results:  CT abdomen/pelvis w/o 1/27/24:  FINDINGS:   LOWER CHEST: Dependent atelectasis in the right middle and lower lobe.  HEPATOBILIARY: Normal unenhanced liver contours. Gallbladder is present.  PANCREAS: Unremarkable.  SPLEEN: Unremarkable.  ADRENAL GLANDS: Unremarkable.  KIDNEYS/BLADDER: Normal unenhanced renal contours. No urolithiasis or hydronephrosis. Normal bladder contours.  BOWEL: No bowel obstruction. Liquid stool throughout the colon. No evidence of toxic megacolon. No free fluid or free air.  LYMPH NODES: No lymphadenopathy.  VASCULATURE: Abdominal aorta is nonaneurysmal with severe atheromatous disease.  PELVIC ORGANS: Hysterectomy.  MUSCULOSKELETAL: No aggressive osseous lesions.                                                                   IMPRESSION:   1.  Liquid stool throughout the colon, nonspecific but can be seen in setting of enteritis/colitis.  2.  Additional details in the findings.     CT head 1/27/24:  IMPRESSION:  1.  Stable compared to previous head CT 10/22/2023.  2.  No intracranial hemorrhage, mass lesions, hydrocephalus or CT evidence for an acute infarct.  3.  Moderate-sized area of encephalomalacia involving the right cerebellar hemisphere. Small encephalomalacia in the left cerebellar hemisphere.   4.  Mild diffuse cerebral parenchymal volume loss. Presumed chronic hypertensive/microvascular ischemic white matter changes.      Procedure results:  Colonoscopy 1/19/24 ([place]):  Findings:       The terminal ileum appeared  normal.        A few small-mouthed diverticula were found in the sigmoid colon.        The colon otherwise appeared normal. Random colon biopsies for histology        were taken from the colon for evaluation of microscopic colitis.        Impression:     - Chronic diarrhea                          - Personal history of polyp                          - Mild diverticulosis   Recommendation:        - High fiber / lactose free diet.                          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 TSH WNL 2 months ago, Infectious stool studies negative, tTG IgA negative. Colonoscopy unremarkable 1/19 but pathology positive for collagenous colitis. She was started on budesonide 9 mg daily 1/22.     PLAN:  - Continue budesonide 9 mg daily- take 9 mg daily x 6 weeks, 6 mg daily x 2 weeks, then 3 mg daily x 2 weeks  - Okay to continue Imodium prn   - Regular diet as tolerated  - Electrolyte monitoring/replacement per medicine  - Outpatient [facility] follow up in 3 months to check in after completion of budesonide    We will no longer actively follow this patient in-house. Please call if questions arise or patient's status changes.         (Dr. Joseph)  Bety Brumfield PA-C  [facility]  1/23/2024 9:57 AM  [phone] (office)    20 minutes of total time was spent providing patient care, including patient evaluation, reviewing documentation/test results, , and  documentation.  ________________________________________________________________________

## 2024-01-23 NOTE — PLAN OF CARE
Problem: Electrolyte Imbalance  Goal: Electrolyte Balance  Outcome: Not Progressing     Problem: Diarrhea  Goal: Effective Diarrhea Management  Outcome: Progressing  Intervention: Manage Diarrhea  Recent Flowsheet Documentation  Taken 1/23/2024 0500 by Jessica Virk, RN  Isolation Precautions:   contact precautions maintained   enteric precautions maintained  Medication Review/Management: medications reviewed  Taken 1/22/2024 3808 by Jessica Virk, RN  Isolation Precautions:   contact precautions maintained   enteric precautions maintained  Medication Review/Management: medications reviewed     Goal Outcome Evaluation:         Left facial pain, has history of trigeminal neuralgia. Rated pain at 9 out of 10. Got tylenol #3 twice tonight. Her pain goes down to 6 after pain meds. Got hydroxyzine for sleep as well. Had 3 small soft, loose stool tonight. On potassium and phosphorous protocol. TCU when medically ready.

## 2024-01-23 NOTE — PROGRESS NOTES
"Care Management Follow Up    Length of Stay (days): 6    Expected Discharge Date: 01/23/2024     Concerns to be Addressed: discharge planning     Patient plan of care discussed at interdisciplinary rounds: Yes    Anticipated Discharge Disposition:  Transitional care - [facility]     Anticipated Discharge Services:  [facility] care - [facility]  Anticipated Discharge DME:  NA    Patient/family educated on Medicare website which has current facility and service quality ratings:  NA  Education Provided on the Discharge Plan:  Yes per team  Patient/Family in Agreement with the Plan:  Yes    Referrals Placed by CM/SW:  Yes  Private pay costs discussed: Not applicable    Additional Information:  Sent message to provider, Dr. Mcarthur, following up on patient discharge for today?    Social Hx: \"Assessed. Accepted at [facility]. Emily auth received (1/19-1/25). Lives in a house alone. Independent with ADLs and gets help with IADLs. [facility] PT services. [facility] transport.\"    RNCM to follow for medical progression, recommendations, and final discharge plan.     Carol Merchant, RN     0858 per Dr. Mcarthur, \"The electrolytes are still off. Hopefully tomorrow, she will be ready.\"  Updated [facility]   "

## 2024-01-24 ENCOUNTER — APPOINTMENT (OUTPATIENT)
Dept: OCCUPATIONAL THERAPY | Facility: HOSPITAL | Age: 79
DRG: 391 | End: 2024-01-24
Payer: COMMERCIAL

## 2024-01-24 VITALS
TEMPERATURE: 98.3 F | RESPIRATION RATE: 18 BRPM | BODY MASS INDEX: 17.72 KG/M2 | SYSTOLIC BLOOD PRESSURE: 128 MMHG | HEART RATE: 84 BPM | OXYGEN SATURATION: 92 % | WEIGHT: 109.79 LBS | DIASTOLIC BLOOD PRESSURE: 65 MMHG

## 2024-01-24 LAB
PHOSPHATE SERPL-MCNC: 2.8 MG/DL (ref 2.5–4.5)
POTASSIUM SERPL-SCNC: 3.4 MMOL/L (ref 3.4–5.3)
POTASSIUM SERPL-SCNC: 3.4 MMOL/L (ref 3.4–5.3)

## 2024-01-24 PROCEDURE — 84132 ASSAY OF SERUM POTASSIUM: CPT | Performed by: INTERNAL MEDICINE

## 2024-01-24 PROCEDURE — 99239 HOSP IP/OBS DSCHRG MGMT >30: CPT | Performed by: INTERNAL MEDICINE

## 2024-01-24 PROCEDURE — 250N000013 HC RX MED GY IP 250 OP 250 PS 637: Performed by: PHYSICIAN ASSISTANT

## 2024-01-24 PROCEDURE — 36415 COLL VENOUS BLD VENIPUNCTURE: CPT | Performed by: INTERNAL MEDICINE

## 2024-01-24 PROCEDURE — 84100 ASSAY OF PHOSPHORUS: CPT | Performed by: INTERNAL MEDICINE

## 2024-01-24 PROCEDURE — 250N000013 HC RX MED GY IP 250 OP 250 PS 637: Performed by: INTERNAL MEDICINE

## 2024-01-24 PROCEDURE — 97535 SELF CARE MNGMENT TRAINING: CPT | Mod: GO

## 2024-01-24 RX ORDER — BUDESONIDE 3 MG/1
CAPSULE, COATED PELLETS ORAL
DISCHARGE
Start: 2024-01-25 | End: 2024-02-28

## 2024-01-24 RX ORDER — POTASSIUM CHLORIDE 1500 MG/1
20 TABLET, EXTENDED RELEASE ORAL ONCE
Status: COMPLETED | OUTPATIENT
Start: 2024-01-24 | End: 2024-01-24

## 2024-01-24 RX ORDER — ACETAMINOPHEN AND CODEINE PHOSPHATE 300; 30 MG/1; MG/1
1 TABLET ORAL EVERY 6 HOURS PRN
Qty: 30 TABLET | Refills: 0 | Status: SHIPPED | OUTPATIENT
Start: 2024-01-24 | End: 2024-01-25

## 2024-01-24 RX ADMIN — MECLIZINE 12.5 MG: 12.5 TABLET ORAL at 08:26

## 2024-01-24 RX ADMIN — HYDROXYZINE HYDROCHLORIDE 10 MG: 10 TABLET ORAL at 03:18

## 2024-01-24 RX ADMIN — GABAPENTIN 100 MG: 100 CAPSULE ORAL at 08:26

## 2024-01-24 RX ADMIN — OXCARBAZEPINE 450 MG: 150 TABLET, FILM COATED ORAL at 08:26

## 2024-01-24 RX ADMIN — LEVETIRACETAM 750 MG: 500 TABLET, FILM COATED ORAL at 08:26

## 2024-01-24 RX ADMIN — BUDESONIDE 9 MG: 3 CAPSULE ORAL at 08:26

## 2024-01-24 RX ADMIN — ACETAMINOPHEN AND CODEINE PHOSPHATE 1 TABLET: 300; 30 TABLET ORAL at 06:48

## 2024-01-24 RX ADMIN — POTASSIUM CHLORIDE 20 MEQ: 1500 TABLET, EXTENDED RELEASE ORAL at 06:49

## 2024-01-24 RX ADMIN — PANTOPRAZOLE SODIUM 40 MG: 40 TABLET, DELAYED RELEASE ORAL at 06:49

## 2024-01-24 RX ADMIN — ACETAMINOPHEN AND CODEINE PHOSPHATE 1 TABLET: 300; 30 TABLET ORAL at 00:20

## 2024-01-24 ASSESSMENT — ACTIVITIES OF DAILY LIVING (ADL)
ADLS_ACUITY_SCORE: 50

## 2024-01-24 NOTE — PROGRESS NOTES
"Care Management Follow Up    Length of Stay (days): 7    Expected Discharge Date: 01/24/2024     Concerns to be Addressed: discharge planning     Patient plan of care discussed at interdisciplinary rounds: Yes    Anticipated Discharge Disposition:  Transitional care - [facility]     Anticipated Discharge Services:  Transitional care - [facility]  Anticipated Discharge DME:  NA    Patient/family educated on Medicare website which has current facility and service quality ratings:  NA  Education Provided on the Discharge Plan:  Yes per team  Patient/Family in Agreement with the Plan:  Yes    Referrals Placed by CM/SW:  Yes  Private pay costs discussed: Not applicable    Additional Information:  0815 called Hafsa with [facility] and left a message patient's labs improved and maybe discharge today, what time can patient arrive at TCU?    Per provider, Dr. Mcarthur, patient ready for discharge today to TCU.    Met patient at bedside to discuss the above. Patient said to call her daughter regarding the transportation.    Called patient's daughter, Alissa. She said she will  patient around 1PM.    Informed bedside nurse, Haley and Bety WAYNE    Social Hx: \"Assessed. Accepted at [facility]. Emily wilde received (1/19-1/25). Lives in a house alone. Independent with ADLs and gets help with IADLs. [facility] PT services. [facility] transport.\"    RNCM to follow for medical progression, recommendations, and final discharge plan.     Carol Merchant RN     "

## 2024-01-24 NOTE — PLAN OF CARE
Problem: Electrolyte Imbalance  Goal: Electrolyte Balance  Outcome: Progressing     Problem: Diarrhea  Goal: Effective Diarrhea Management  Outcome: Progressing  Intervention: Manage Diarrhea  Recent Flowsheet Documentation  Taken 1/24/2024 0020 by Jessica Virk, RN  Isolation Precautions: contact precautions maintained  Medication Review/Management: medications reviewed     Goal Outcome Evaluation:        Left facial pain, rated 9 out of 10. PRN pain meds given. Pain went down to 6 per patient. Also got PRN hydroxyzine for sleep tonight. No stool tonight. On potassium and phosphorous protocol. TCU when medically stable.

## 2024-01-24 NOTE — PROGRESS NOTES
Care Management Discharge Note    Discharge Date: 01/24/2024       Discharge Disposition: Transitional Care - [facility]    Discharge Services: Transitional Care - [facility]    Discharge DME: None    Discharge Transportation: family or friend will provide    Private pay costs discussed: Not applicable    Does the patient's insurance plan have a 3 day qualifying hospital stay waiver?  Yes     Which insurance plan 3 day waiver is available? Alternative insurance waiver    Will the waiver be used for post-acute placement? Yes    PAS Confirmation Code: [id number]  Patient/family educated on Medicare website which has current facility and service quality ratings: yes    Education Provided on the Discharge Plan: Yes per team  Persons Notified of Discharge Plans: Patient per team  Patient/Family in Agreement with the Plan: yes    Handoff Referral Completed: Yes    Additional Information:  Patient is discharge to [facility]. Family will transport around 1300.    Alesha wilde [id number] 1/19/24 - 1/25/24  Faxed to [facility] [phone]    Carol Merchant RN

## 2024-01-24 NOTE — DISCHARGE SUMMARY
[facility]  Hospitalist Discharge Summary      Date of Admission:  1/17/2024  Date of Discharge:  1/24/2024  Discharging Provider: Dhruv Mcarthur MD  Discharge Service: Hospitalist Service    Discharge Diagnoses     Principal Problem:    Chronic diarrhea  Active Problems:    Trigeminal neuralgia    Hypokalemia    Seizure disorder (H)    Collagenous colitis    Hypotension due to hypovolemia    Hypomagnesemia    Hypophosphatemia    Hypernatremia   Severe protein-calorie malnutrition    Clinically Significant Risk Factors     # Severe Malnutrition: based on nutrition assessment      Follow-ups Needed After Discharge   Follow-up Appointments     Follow Up and recommended labs and tests      * Follow up with Nursing home physician in one week.  The following   labs/tests are recommended: BMP.  * Follow up with [facility] in 3 months.  [facility], Tel: [phone].          Discharge Disposition   Discharged to short-term care facility  Condition at discharge: Stable    Hospital Course     Julisa Chaney is a 78 year old female presented on 1/17/24 for evaluation of chronic diarrhea. She was admitted for hypotension, dehydration and electrolyte abnormalities.  She was diagnosed with collagenous colitis.     Patient reports having diarrhea for several months. She has bowel movement with watery stool up to 7 times a day. On admission, concerning for sepsis, she was given Cefepime and Vancomycin. CT abdomen suggested nonspecific colitis. Patient was given hydration. Her hypotension and hypernatremia resolved. Blood culture and stool culture had no growth. Antibiotics were stopped. Patient had colonoscopy on 1/19. Biopsy showed collagenous colitis. Per GI recommendation, a tapering course of steroid with budesonide was initiated. Her diarrhea significantly improved.   Her hypokalemia, hypomagnesemia and hypophosphatemia are all corrected after replacement. Patient was discharged home in a stable  condition on 1/24/24. She is advised to follow up with [facility] in 3 months.     Severe protein-calorie malnutrition:   This is due to her diarrhea. She was evaluated by dietitian and nutrition supplements were provided.      Chronic stable conditions:  Seizure disorder  Trigeminal neuralgia  Anemia: Hemoglobin decreased to the level of 10.5 during this admission. No signs of bleeding. Likely due to dilutional with IVF.    GERD      Consultations This Hospital Stay   PHARMACY TO DOSE VANCO  GASTROENTEROLOGY IP CONSULT  PHARMACY TO DOSE VANCO  PHYSICAL THERAPY ADULT IP CONSULT  OCCUPATIONAL THERAPY ADULT IP CONSULT  CARE MANAGEMENT / SOCIAL WORK IP CONSULT  NUTRITION SERVICES ADULT IP CONSULT  PHYSICAL THERAPY ADULT IP CONSULT  OCCUPATIONAL THERAPY ADULT IP CONSULT    Code Status   Full Code    Time Spent on this Encounter   I, Dhruv Mcarthur MD, personally saw the patient today and spent greater than 30 minutes discharging this patient.       Dhruv Mcarthur MD  [facility] [address] [address]  Phone: [phone]  Fax: [phone]  ______________________________________________________________________    Physical Exam   Vital Signs: Temp: 98.3  F (36.8  C) Temp src: Oral BP: 128/65 Pulse: 84   Resp: 18 SpO2: 92 % O2 Device: None (Room air)    Weight: 109 lbs 12.63 oz    General appearance: not in acute distress  HEENT: PERRL, EOMI  Lungs: Clear breath sounds in bilateral lung fields  Cardiovascular: Regular rate and rhythm, normal S1-S2  Abdomen: Soft, non tender, no distension  Musculoskeletal: No joint swelling  Skin: No rash and no edema  Neurology: AAO ×3.  Cranial nerves II - XII normal.  Normal muscle strength in all four extremities.        Primary Care Physician   Mara Murillo    Discharge Orders      General info for SNF    Length of Stay Estimate: Short Term Care: Estimated # of Days <30  Condition at Discharge: Improving  Level of care:skilled    Rehabilitation Potential: Good  Admission H&P remains valid and up-to-date: Yes  Recent Chemotherapy: N/A  Use Nursing Home Standing Orders: Yes     Mantoux instructions    Give two-step Mantoux (PPD) Per Facility Policy Yes     Follow Up and recommended labs and tests    * Follow up with Nursing home physician in one week.  The following labs/tests are recommended: BMP.  * Follow up with [facility] in 3 months.  [facility], Tel: [phone].     Reason for your hospital stay    Evaluation of diarrhea.     Activity - Up with nursing assistance     Physical Therapy Adult Consult    Evaluate and treat as clinically indicated.    Reason:  Generalized weakness     Occupational Therapy Adult Consult    Evaluate and treat as clinically indicated.    Reason:  Generalized weakness     Fall precautions     Diet    Follow this diet upon discharge: Orders Placed This Encounter      Snacks/Supplements Adult: Other; PRN; With Meals      Snacks/Supplements Adult: Ensure Clear; Between Meals      High Fiber Diet       Significant Results and Procedures   Most Recent 3 CBC's:  Recent Labs   Lab Test 01/23/24  0446 01/22/24  1303 01/22/24  0454 01/21/24  0430   WBC 5.6  --  5.2 6.2   HGB 10.4* 10.8* 10.6* 13.9   MCV 85  --  84 86     --  414 438     Most Recent 3 BMP's:  Recent Labs   Lab Test 01/24/24  1031 01/24/24  0429 01/23/24  1839 01/23/24  1153 01/23/24  0446 01/22/24  1303 01/22/24  0454 01/21/24  0430   NA  --   --   --   --  146*  --  145 139   POTASSIUM 3.4 3.4 3.7   < > 3.3*   < > 2.6* 3.7   CHLORIDE  --   --   --   --  110*  --  107 102   CO2  --   --   --   --  33*  --  30* 26   BUN  --   --   --   --  2.9*  --  2.6* 1.8*   CR  --   --   --   --  0.53  --  0.55 0.48*   ANIONGAP  --   --   --   --  3*  --  8 11   ADY  --   --   --   --  7.7*  --  7.7* 8.0*   GLC  --   --   --   --  77  --  75 80    < > = values in this interval not displayed.       EXAM: CT HEAD W/O CONTRAST  LOCATION: [facility]  DATE: 1/17/2024     INDICATION: h o pyogenic brain abscess drained, now with SIRS and fatigue, no AMS  COMPARISON: Head CT 10/22/2023  TECHNIQUE: Routine CT Head without IV contrast. Multiplanar reformats. Dose reduction techniques were used.     FINDINGS:  INTRACRANIAL CONTENTS: No intracranial hemorrhage. Mild diffuse cerebral parenchymal volume loss. No midline shift. The basilar cisterns are patent. Moderate-sized area of encephalomalacia involving the right cerebellar hemisphere. Small encephalomalacia   in the left cerebellar hemisphere. Mild periventricular and scattered foci of deep white matter hypodensities involving both cerebral hemispheres. No CT evidence for an acute infarct.     VISUALIZED ORBITS/SINUSES/MASTOIDS: Prior bilateral cataract surgery. Visualized portions of the orbits are otherwise unremarkable. Small mucous retention cysts in the posterior right ethmoid air cells. Mild nasoseptal bowing to the right anteriorly. No   middle ear or mastoid effusion.     BONES/SOFT TISSUES: No acute abnormality.                                                                      IMPRESSION:  1.  Stable compared to previous head CT 10/22/2023.  2.  No intracranial hemorrhage, mass lesions, hydrocephalus or CT evidence for an acute infarct.  3.  Moderate-sized area of encephalomalacia involving the right cerebellar hemisphere. Small encephalomalacia in the left cerebellar hemisphere.   4.  Mild diffuse cerebral parenchymal volume loss. Presumed chronic hypertensive/microvascular ischemic white matter changes.      EXAM: CT ABDOMEN PELVIS W/O CONTRAST  LOCATION: [facility]  DATE: 1/17/2024     INDICATION: chronic ongoing diarrhea now with SIRS  COMPARISON: CT abdomen pelvis 07/19/2022  TECHNIQUE: CT scan of the abdomen and pelvis was performed without IV contrast. Multiplanar reformats were obtained. Dose reduction techniques were used.  CONTRAST: None.      FINDINGS:   LOWER CHEST: Dependent atelectasis in the right middle and lower lobe.     HEPATOBILIARY: Normal unenhanced liver contours. Gallbladder is present.     PANCREAS: Unremarkable.     SPLEEN: Unremarkable.     ADRENAL GLANDS: Unremarkable.     KIDNEYS/BLADDER: Normal unenhanced renal contours. No urolithiasis or hydronephrosis. Normal bladder contours.     BOWEL: No bowel obstruction. Liquid stool throughout the colon. No evidence of toxic megacolon. No free fluid or free air.     LYMPH NODES: No lymphadenopathy.     VASCULATURE: Abdominal aorta is nonaneurysmal with severe atheromatous disease.     PELVIC ORGANS: Hysterectomy.     MUSCULOSKELETAL: No aggressive osseous lesions.                                                                      IMPRESSION:   1.  Liquid stool throughout the colon, nonspecific but can be seen in setting of enteritis/colitis.  2.  Additional details in the findings.            Discharge Medications   Current Discharge Medication List        START taking these medications    Details   budesonide (ENTOCORT EC) 3 MG EC capsule Take 3 capsules (9 mg) by mouth every morning for 42 days, THEN 2 capsules (6 mg) every morning for 14 days, THEN 1 capsule (3 mg) every morning for 14 days.    Associated Diagnoses: Colitis, collagenous           CONTINUE these medications which have CHANGED    Details   acetaminophen-codeine (TYLENOL #3) 300-30 MG per tablet Take 1 tablet by mouth every 6 hours as needed for severe pain  Qty: 30 tablet, Refills: 0    Associated Diagnoses: Trigeminal neuralgia; Chronic pain syndrome           CONTINUE these medications which have NOT CHANGED    Details   ferrous sulfate (FEROSUL) 325 (65 Fe) MG tablet Take 1 tablet (325 mg) by mouth daily (with breakfast)  Qty: 90 tablet, Refills: 1    Associated Diagnoses: Iron deficiency anemia due to chronic blood loss      !! gabapentin (NEURONTIN) 100 MG capsule Take 100 mg by mouth daily      !! gabapentin  (NEURONTIN) 100 MG capsule Take 300 mg by mouth at bedtime      levETIRAcetam (KEPPRA) 750 MG tablet Take 1 tablet (750 mg) by mouth 2 times daily  Qty: 60 tablet, Refills: 0    Associated Diagnoses: Persistent insomnia      loperamide (IMODIUM A-D) 2 MG tablet Take 1 tablet (2 mg) by mouth 4 times daily as needed for diarrhea  Qty: 30 tablet, Refills: 1    Associated Diagnoses: Diarrhea, unspecified type      meclizine (ANTIVERT) 12.5 MG tablet Take 12.5 mg by mouth 2 times daily      mirtazapine (REMERON) 15 MG tablet Take 1 tablet (15 mg) by mouth at bedtime  Qty: 30 tablet, Refills: 0    Associated Diagnoses: Current mild episode of major depressive disorder without prior episode (H24)      Multiple Vitamin (MULTIVITAMIN) TABS Take 1 tablet by mouth daily  Qty: 100 tablet, Refills: 3    Associated Diagnoses: Trigeminal nerve disorder      nortriptyline (PAMELOR) 50 MG capsule Take 1 capsule (50 mg) by mouth at bedtime  Qty: 90 capsule, Refills: 3    Associated Diagnoses: Trigeminal neuralgia; Chronic pain syndrome      omeprazole (PRILOSEC) 40 MG DR capsule Take 1 capsule (40 mg) by mouth daily  Qty: 90 capsule, Refills: 1    Associated Diagnoses: Gastroesophageal reflux disease without esophagitis      OXcarbazepine (TRILEPTAL) 150 MG tablet TAKE 3 TABLETS(450 MG) BY MOUTH DAILY  Qty: 270 tablet, Refills: 1    Comments: **Patient requests 90 days supply**  Associated Diagnoses: Facial neuralgia      polyvinyl alcohol (ARTIFICIAL TEARS) 1.4 % ophthalmic solution Place 1 drop into both eyes every hour as needed for dry eyes  Qty: 60 mL, Refills: 0    Associated Diagnoses: Lung infiltrate; Infection due to 2019 novel coronavirus; Sepsis, due to unspecified organism, unspecified whether acute organ dysfunction present (H); Pyogenic brain abscess; Encephalitis; ORTIZ (acute kidney injury) (H24); Pyloric ulcer, chronic; Current mild episode of major depressive disorder without prior episode (H24); Abnormal chest CT;  Chronic pain syndrome; Hypercalcemia; Disorder of bone and cartilage; Pure hypercholesterolemia; Other migraine without status migrainosus, not intractable; Trigeminal neuralgia; Counseling regarding advanced directives and goals of care; Controlled substance agreement signed      psyllium (METAMUCIL) 58.6 % packet Take 1 packet by mouth daily      QUEtiapine (SEROQUEL) 50 MG tablet Take 1 tablet (50 mg) by mouth at bedtime  Qty: 30 tablet, Refills: 0    Associated Diagnoses: Anxiety      topiramate (TOPAMAX) 50 MG tablet Take 1 tablet (50 mg) by mouth at bedtime  Qty: 30 tablet, Refills: 0    Associated Diagnoses: Intractable chronic migraine without aura and without status migrainosus      Vitamin D3 50 mcg (2000 units) tablet Take 1 tablet (50 mcg) by mouth daily  Qty: 90 tablet, Refills: 1    Associated Diagnoses: Vitamin D deficiency      Wound Dressings (TRIAD HYDROPHILIC WOUND DRESSI) PSTE Use for wound care dressing daily  Qty: 170 g, Refills: 1    Associated Diagnoses: Encounter for wound care       !! - Potential duplicate medications found. Please discuss with provider.        Allergies   Allergies   Allergen Reactions    Contrast [Iohexol] Rash     Noticed during 08/2020 admission. Received IV contrast on 8/5    Chocolate Headache    Contrast Dye Rash    Penicillins Rash

## 2024-01-24 NOTE — PLAN OF CARE
Occupational Therapy Discharge Summary    Reason for therapy discharge:    Discharged to TCU.    Progress towards therapy goal(s). See goals on Care Plan in [facility] electronic health record for goal details.  Progressing towards goals.    Therapy recommendation(s):    Recommend continued OT @ TCU.

## 2024-01-25 ENCOUNTER — PATIENT OUTREACH (OUTPATIENT)
Dept: CARE COORDINATION | Facility: CLINIC | Age: 79
End: 2024-01-25
Payer: COMMERCIAL

## 2024-01-25 ENCOUNTER — TELEPHONE (OUTPATIENT)
Dept: GERIATRICS | Facility: CLINIC | Age: 79
End: 2024-01-25
Payer: COMMERCIAL

## 2024-01-25 DIAGNOSIS — G50.0 TRIGEMINAL NEURALGIA: Primary | ICD-10-CM

## 2024-01-25 DIAGNOSIS — G89.4 CHRONIC PAIN SYNDROME: ICD-10-CM

## 2024-01-25 DIAGNOSIS — G89.4 CHRONIC PAIN SYNDROME: Primary | ICD-10-CM

## 2024-01-25 RX ORDER — ACETAMINOPHEN AND CODEINE PHOSPHATE 300; 30 MG/1; MG/1
1 TABLET ORAL EVERY 6 HOURS PRN
Qty: 30 TABLET | Refills: 0 | Status: SHIPPED | OUTPATIENT
Start: 2024-01-25 | End: 2024-02-09

## 2024-01-25 RX ORDER — OXYCODONE HYDROCHLORIDE 5 MG/1
5 TABLET ORAL ONCE
COMMUNITY
End: 2024-01-25

## 2024-01-25 RX ORDER — OXYCODONE HYDROCHLORIDE 5 MG/1
5 TABLET ORAL ONCE
Qty: 1 TABLET | Refills: 0 | Status: SHIPPED | OUTPATIENT
Start: 2024-01-25 | End: 2024-01-26

## 2024-01-25 NOTE — TELEPHONE ENCOUNTER
[facility] Geriatrics Triage Nurse Telephone Encounter    Provider: DANIELLE Mcrae CNP   Facility: [facility] Facility Type:  TCU    Caller: Ashley  Call Back Number: [phone]    Allergies:    Allergies   Allergen Reactions    Contrast [Iohexol] Rash     Noticed during 08/2020 admission. Received IV contrast on 8/5    Chocolate Headache    Contrast Dye Rash    Penicillins Rash        Reason for call: Nurse called to report that patient admitted to facility yesterday with no script for her Tylenol #3.  Nurse called pharmacy and they will not send out the medication STAT even if script was obtained.  Nurse is looking for a one time order to take a narcotic out of the E-Kit.  Current pain medications available in E-Kit are Oxycodone, Percocet, and Morphine.      Verbal Order/Direction given by Provider: Okay to give Oxycodone 5 mg po one time out of the E-Kit.      Provider giving Order:  DANIELLE Mcrae CNP     Verbal Order given to: Ashley Hicks RN

## 2024-01-25 NOTE — LETTER
Hand-off  for Care Coordination    Att: Discharge Planner:  Please if able, fax or call the number listed below when the below patient is discharged from your facility.  Clinic Care Coordination from patient's Primary Care Clinic will outreach to patient upon discharge to assist with TCU transition/discharge.    Thank you      Patient Name:   Julisa Chaney  Patient :     1945  Patient PCP:     Mara Murillo MD    Patient Primary Clinic:   [address] [place] [id number]  D/C Facility: _____________________________________________   TCU Contact Info for questions: ___________________________  D/C Date:  ______________________________________________  Follow-up Apt with PCP after TCU D/C:   ____________________  Other Follow-up Apt s:      _________________________________________  Additional information (concerns, and Home Care, ect ):   __________________________________________________________________  __________________________________________________________________  Care Coordinator to Contact at [place]  Fax to: [phone]  Attn:  Aleshia Moraes RN Clinic Care Coordination [facility]  Phone: [phone]

## 2024-01-25 NOTE — PROGRESS NOTES
"Clinic Care Coordination Contact  Care Coordination Transition Communication    Clinical Data: Patient was hospitalized at [facility] from 1/17 to 1/24 with diagnosis of Hypotension, diarrhea,hypokalemia, hypernatremia,failure to thrive.     Assessment: Patient has transitioned to TCU/ARU for short term rehabilitation:    Facility Name: [facility]     Transition Communication:  Notified facility of Primary Care- Care Coordination support via Epic fax.    Plan: Care Coordinator will await notification from facility staff informing of patient's discharge plans/needs. Care Coordinator will review chart and outreach to facility staff every 4 weeks and as needed.     Per hospital chart review: Julisa lives in a house alone. She is independent with ADLs at baseline and daughter Alissa helps with all IADLs. \"My daughter helps with stand by assist when I shower and then with anything else I need. I can usually dress myself, but not now. I cannot care for myself now. My daughter and grandkids help with anything I would need. They stop by for meals and medications 3 times a day to help me\".  \"I mostly use the wheelchair all the time. I do use the walker for short distances like into the bathroom at home\".      "

## 2024-01-25 NOTE — PROGRESS NOTES
Received a page looking for Tylenol 3 that apparently did not arrive with the patient's admission packet.  Call placed to pharmacy for emergency supply 12 tablets Tylenol 3 1 tablet every 6 hours as needed for severe pain.  Mell Frazier, CNP

## 2024-01-26 ENCOUNTER — DOCUMENTATION ONLY (OUTPATIENT)
Dept: GERIATRICS | Facility: CLINIC | Age: 79
End: 2024-01-26

## 2024-01-26 ENCOUNTER — TRANSITIONAL CARE UNIT VISIT (OUTPATIENT)
Dept: GERIATRICS | Facility: CLINIC | Age: 79
End: 2024-01-26
Payer: COMMERCIAL

## 2024-01-26 VITALS
HEIGHT: 66 IN | OXYGEN SATURATION: 92 % | DIASTOLIC BLOOD PRESSURE: 51 MMHG | RESPIRATION RATE: 18 BRPM | SYSTOLIC BLOOD PRESSURE: 116 MMHG | HEART RATE: 82 BPM | TEMPERATURE: 97.3 F | WEIGHT: 114.6 LBS | BODY MASS INDEX: 18.42 KG/M2

## 2024-01-26 DIAGNOSIS — R53.81 PHYSICAL DECONDITIONING: Primary | ICD-10-CM

## 2024-01-26 DIAGNOSIS — K52.831 COLLAGENOUS COLITIS: ICD-10-CM

## 2024-01-26 DIAGNOSIS — G89.4 CHRONIC PAIN SYNDROME: ICD-10-CM

## 2024-01-26 PROCEDURE — 99309 SBSQ NF CARE MODERATE MDM 30: CPT | Performed by: NURSE PRACTITIONER

## 2024-01-26 NOTE — LETTER
1/26/2024        RE: Julisa Chaney  [address]  [place] [address]        [facility] GERIATRICS    PRIMARY CARE PROVIDER AND CLINIC:  Mara Murillo MD, [address] [facility] / [place] [address]  Chief Complaint   Patient presents with     Hospital F/U      [facility] Medical Record Number:  [id number]  Place of Service where encounter took place:  [facility] (TCU) [[id number]]    Julisa Chaney  is a 78 year old  (1945), admitted to the above facility from  [facility]. Hospital stay 1/17/24 through 1/24/24. Discharge summary not completed.   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 She was at this TCU in Oct/Nov after a hospitalization for norovirus. When she discharged from this TCU, she was still having loose stools, advised to take imodium. She presented to the ED 1/17/24 due to ongoing diarrhea that never resolved. She was admitted with dehydration, hypotension, and electrolyte abnormalities. She had a colonoscopy on 1/19/24, biopsy showed collagenous colitis. She was started on budesonide and high fiber diet.     HPI obtained from patient visit, review of nursing home record, discussion with facility staff, and Epic review.     HPI:    Patient reports that for this first time today she has noted some slight firming of her stool. Previously she could not differentiate between having a BM and urinating. She can now tell a difference. No N/V or abdominal pain. Appetite is ok. She has chronic pain from trigeminal neuralgia. She did not have her Tylenol #3 for some time after admitting here and had a tough time until yesterday morning when her daughter brought hers from home. She feels better now that it is worked out and she is getting the medication.     CODE STATUS/ADVANCE DIRECTIVES DISCUSSION:  Full Code    ALLERGIES:   Allergies   Allergen Reactions     Contrast [Iohexol] Rash     Noticed during  08/2020 admission. Received IV contrast on 8/5     Chocolate Headache     Contrast Dye Rash     Penicillins Rash      PAST MEDICAL HISTORY:   Past Medical History:   Diagnosis Date     Aspiration pneumonia (H) 02/2022     Depression      High cholesterol      Migraines      Pyloric ulcer, chronic      Sepsis (H) 08/10/2020     Trigeminal neuralgia       PAST SURGICAL HISTORY:   has a past surgical history that includes Colonoscopy w/wo Brush **Performed** (N/A, 8/13/2020); Pr Esophagogastroduodenoscopy Transoral Diagnostic (N/A, 8/13/2020); Hysterectomy; Pr Esophagogastroduodenoscopy Transoral Diagnostic (N/A, 8/14/2020); PICC/Midline Placement (7/22/2022); Ventriculostomy (Left, 7/31/2022); IR Gastrostomy Tube Percutaneous Plcmnt (8/16/2022); and Colonoscopy (N/A, 1/19/2024).  FAMILY HISTORY: family history includes Breast Cancer in her maternal aunt, mother, sister, and sister; Cancer in her father; Testicular cancer (age of onset: 17.00) in her grandchild.  SOCIAL HISTORY:   reports that she quit smoking about 9 years ago. She has a 50 pack-year smoking history. She has never used smokeless tobacco. She reports that she does not drink alcohol and does not use drugs.  Patient's living condition: lives alone    Post Discharge Medication Reconciliation Status:   MED REC REQUIRED  Post Medication Reconciliation Status:  Discharge medications reconciled, continue medications without change       Current Outpatient Medications   Medication Sig     acetaminophen-codeine (TYLENOL #3) 300-30 MG per tablet Take 1 tablet by mouth every 6 hours as needed for severe pain     budesonide (ENTOCORT EC) 3 MG EC capsule Take 3 capsules (9 mg) by mouth every morning for 42 days, THEN 2 capsules (6 mg) every morning for 14 days, THEN 1 capsule (3 mg) every morning for 14 days.     ferrous sulfate (FEROSUL) 325 (65 Fe) MG tablet Take 1 tablet (325 mg) by mouth daily (with breakfast)     gabapentin (NEURONTIN) 100 MG capsule Take 100  "mg by mouth daily     gabapentin (NEURONTIN) 100 MG capsule Take 300 mg by mouth at bedtime     levETIRAcetam (KEPPRA) 750 MG tablet Take 1 tablet (750 mg) by mouth 2 times daily     loperamide (IMODIUM A-D) 2 MG tablet Take 1 tablet (2 mg) by mouth 4 times daily as needed for diarrhea     meclizine (ANTIVERT) 12.5 MG tablet Take 12.5 mg by mouth 2 times daily     mirtazapine (REMERON) 15 MG tablet Take 1 tablet (15 mg) by mouth at bedtime     Multiple Vitamin (MULTIVITAMIN) TABS Take 1 tablet by mouth daily     nortriptyline (PAMELOR) 50 MG capsule Take 1 capsule (50 mg) by mouth at bedtime     omeprazole (PRILOSEC) 40 MG DR capsule Take 1 capsule (40 mg) by mouth daily     OXcarbazepine (TRILEPTAL) 150 MG tablet TAKE 3 TABLETS(450 MG) BY MOUTH DAILY     polyvinyl alcohol (ARTIFICIAL TEARS) 1.4 % ophthalmic solution Place 1 drop into both eyes every hour as needed for dry eyes     psyllium (METAMUCIL) 58.6 % packet Take 1 packet by mouth daily     QUEtiapine (SEROQUEL) 50 MG tablet Take 1 tablet (50 mg) by mouth at bedtime     topiramate (TOPAMAX) 50 MG tablet Take 1 tablet (50 mg) by mouth at bedtime     Vitamin D3 50 mcg (2000 units) tablet Take 1 tablet (50 mcg) by mouth daily     Wound Dressings (TRIAD HYDROPHILIC WOUND DRESSI) PSTE Use for wound care dressing daily     No current facility-administered medications for this visit.       ROS:  10 point ROS of systems including Constitutional, Eyes, Respiratory, Cardiovascular, Gastroenterology, Genitourinary, Integumentary, Musculoskeletal, Psychiatric were all negative except for pertinent positives noted in my HPI.    Vitals:  /51   Pulse 82   Temp 97.3  F (36.3  C)   Resp 18   Ht 1.676 m (5' 6\")   Wt 52 kg (114 lb 9.6 oz)   SpO2 92%   BMI 18.50 kg/m    Exam:  GENERAL APPEARANCE:  Alert, in no distress, thin  ENT:  Mouth and posterior oropharynx normal, moist mucous membranes  EYES:  EOM normal, conjunctiva and lids normal  RESP:  no respiratory " distress  CV:  Palpation and auscultation of heart done , regular rate and rhythm, no murmur, rub, or gallop, no edema  ABDOMEN:  soft, non-tender  PSYCH:  oriented X 3, affect and mood normal    Lab/Diagnostic data:  Recent labs in [facility] reviewed by me today.     ASSESSMENT/PLAN:  (R53.81) Physical deconditioning  (primary encounter diagnosis)  Comment: Due to prolonged illness. Goal is to return to her independent house  Plan: PT/OT eval and treat, discharge planning per their recommendations.    (K52.831) Collagenous colitis  Comment: Starting to show signs of improvement. Verified that budesonide taper orders are correct in nursing home EMR.   Plan: Continue current POC with no changes at this time. Monitor bowel function.     (G89.4) Chronic pain syndrome  Comment: Chronic condition being managed with medications.  Plan: Continue current POC with no changes at this time and adjustments as needed.        Electronically signed by:  DANIELLE Villanueva CNP                     Sincerely,        DANIELLE Villanueva CNP

## 2024-01-26 NOTE — PROGRESS NOTES
[facility] GERIATRICS    PRIMARY CARE PROVIDER AND CLINIC:  Mara Murillo MD, [address] [facility] / [facility] [address]  Chief Complaint   Patient presents with    Hospital F/U      [place] Medical Record Number:  [id number]  Place of Service where encounter took place:  [facility] (TCU) [[id number]]    Julisa Chaney  is a 78 year old  (1945), admitted to the above facility from  [facility]. Hospital stay 1/17/24 through 1/24/24. Discharge summary not completed.   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 She was at this TCU in Oct/Nov after a hospitalization for norovirus. When she discharged from this TCU, she was still having loose stools, advised to take imodium. She presented to the ED 1/17/24 due to ongoing diarrhea that never resolved. She was admitted with dehydration, hypotension, and electrolyte abnormalities. She had a colonoscopy on 1/19/24, biopsy showed collagenous colitis. She was started on budesonide and high fiber diet.     HPI obtained from patient visit, review of nursing home record, discussion with facility staff, and Epic review.     HPI:    Patient reports that for this first time today she has noted some slight firming of her stool. Previously she could not differentiate between having a BM and urinating. She can now tell a difference. No N/V or abdominal pain. Appetite is ok. She has chronic pain from trigeminal neuralgia. She did not have her Tylenol #3 for some time after admitting here and had a tough time until yesterday morning when her daughter brought hers from home. She feels better now that it is worked out and she is getting the medication.     CODE STATUS/ADVANCE DIRECTIVES DISCUSSION:  Full Code    ALLERGIES:   Allergies   Allergen Reactions    Contrast [Iohexol] Rash     Noticed during 08/2020 admission. Received IV contrast on 8/5    Chocolate Headache    Contrast Dye Rash     Penicillins Rash      PAST MEDICAL HISTORY:   Past Medical History:   Diagnosis Date    Aspiration pneumonia (H) 02/2022    Depression     High cholesterol     Migraines     Pyloric ulcer, chronic     Sepsis (H) 08/10/2020    Trigeminal neuralgia       PAST SURGICAL HISTORY:   has a past surgical history that includes Colonoscopy w/wo Brush **Performed** (N/A, 8/13/2020); Pr Esophagogastroduodenoscopy Transoral Diagnostic (N/A, 8/13/2020); Hysterectomy; Pr Esophagogastroduodenoscopy Transoral Diagnostic (N/A, 8/14/2020); PICC/Midline Placement (7/22/2022); Ventriculostomy (Left, 7/31/2022); IR Gastrostomy Tube Percutaneous Plcmnt (8/16/2022); and Colonoscopy (N/A, 1/19/2024).  FAMILY HISTORY: family history includes Breast Cancer in her maternal aunt, mother, sister, and sister; Cancer in her father; Testicular cancer (age of onset: 17.00) in her grandchild.  SOCIAL HISTORY:   reports that she quit smoking about 9 years ago. She has a 50 pack-year smoking history. She has never used smokeless tobacco. She reports that she does not drink alcohol and does not use drugs.  Patient's living condition: lives alone    Post Discharge Medication Reconciliation Status:   MED REC REQUIRED  Post Medication Reconciliation Status:  Discharge medications reconciled, continue medications without change       Current Outpatient Medications   Medication Sig    acetaminophen-codeine (TYLENOL #3) 300-30 MG per tablet Take 1 tablet by mouth every 6 hours as needed for severe pain    budesonide (ENTOCORT EC) 3 MG EC capsule Take 3 capsules (9 mg) by mouth every morning for 42 days, THEN 2 capsules (6 mg) every morning for 14 days, THEN 1 capsule (3 mg) every morning for 14 days.    ferrous sulfate (FEROSUL) 325 (65 Fe) MG tablet Take 1 tablet (325 mg) by mouth daily (with breakfast)    gabapentin (NEURONTIN) 100 MG capsule Take 100 mg by mouth daily    gabapentin (NEURONTIN) 100 MG capsule Take 300 mg by mouth at bedtime    levETIRAcetam  "(KEPPRA) 750 MG tablet Take 1 tablet (750 mg) by mouth 2 times daily    loperamide (IMODIUM A-D) 2 MG tablet Take 1 tablet (2 mg) by mouth 4 times daily as needed for diarrhea    meclizine (ANTIVERT) 12.5 MG tablet Take 12.5 mg by mouth 2 times daily    mirtazapine (REMERON) 15 MG tablet Take 1 tablet (15 mg) by mouth at bedtime    Multiple Vitamin (MULTIVITAMIN) TABS Take 1 tablet by mouth daily    nortriptyline (PAMELOR) 50 MG capsule Take 1 capsule (50 mg) by mouth at bedtime    omeprazole (PRILOSEC) 40 MG DR capsule Take 1 capsule (40 mg) by mouth daily    OXcarbazepine (TRILEPTAL) 150 MG tablet TAKE 3 TABLETS(450 MG) BY MOUTH DAILY    polyvinyl alcohol (ARTIFICIAL TEARS) 1.4 % ophthalmic solution Place 1 drop into both eyes every hour as needed for dry eyes    psyllium (METAMUCIL) 58.6 % packet Take 1 packet by mouth daily    QUEtiapine (SEROQUEL) 50 MG tablet Take 1 tablet (50 mg) by mouth at bedtime    topiramate (TOPAMAX) 50 MG tablet Take 1 tablet (50 mg) by mouth at bedtime    Vitamin D3 50 mcg (2000 units) tablet Take 1 tablet (50 mcg) by mouth daily    Wound Dressings (TRIAD HYDROPHILIC WOUND DRESSI) PSTE Use for wound care dressing daily     No current facility-administered medications for this visit.       ROS:  10 point ROS of systems including Constitutional, Eyes, Respiratory, Cardiovascular, Gastroenterology, Genitourinary, Integumentary, Musculoskeletal, Psychiatric were all negative except for pertinent positives noted in my HPI.    Vitals:  /51   Pulse 82   Temp 97.3  F (36.3  C)   Resp 18   Ht 1.676 m (5' 6\")   Wt 52 kg (114 lb 9.6 oz)   SpO2 92%   BMI 18.50 kg/m    Exam:  GENERAL APPEARANCE:  Alert, in no distress, thin  ENT:  Mouth and posterior oropharynx normal, moist mucous membranes  EYES:  EOM normal, conjunctiva and lids normal  RESP:  no respiratory distress  CV:  Palpation and auscultation of heart done , regular rate and rhythm, no murmur, rub, or gallop, no " edema  ABDOMEN:  soft, non-tender  PSYCH:  oriented X 3, affect and mood normal    Lab/Diagnostic data:  Recent labs in EPIC reviewed by me today.     ASSESSMENT/PLAN:  (R53.81) Physical deconditioning  (primary encounter diagnosis)  Comment: Due to prolonged illness. Goal is to return to her independent house  Plan: PT/OT eval and treat, discharge planning per their recommendations.    (K55.971) Collagenous colitis  Comment: Starting to show signs of improvement. Verified that budesonide taper orders are correct in nursing home EMR.   Plan: Continue current POC with no changes at this time. Monitor bowel function.     (G89.4) Chronic pain syndrome  Comment: Chronic condition being managed with medications.  Plan: Continue current POC with no changes at this time and adjustments as needed.        Electronically signed by:  DANIELLE Villanueva CNP

## 2024-01-29 ENCOUNTER — TRANSITIONAL CARE UNIT VISIT (OUTPATIENT)
Dept: GERIATRICS | Facility: CLINIC | Age: 79
End: 2024-01-29
Payer: COMMERCIAL

## 2024-01-29 VITALS
WEIGHT: 103.8 LBS | DIASTOLIC BLOOD PRESSURE: 58 MMHG | HEART RATE: 70 BPM | RESPIRATION RATE: 16 BRPM | HEIGHT: 66 IN | SYSTOLIC BLOOD PRESSURE: 122 MMHG | TEMPERATURE: 98 F | BODY MASS INDEX: 16.68 KG/M2 | OXYGEN SATURATION: 90 %

## 2024-01-29 DIAGNOSIS — G89.4 CHRONIC PAIN SYNDROME: ICD-10-CM

## 2024-01-29 DIAGNOSIS — R53.81 PHYSICAL DECONDITIONING: Primary | ICD-10-CM

## 2024-01-29 DIAGNOSIS — K52.831 COLLAGENOUS COLITIS: ICD-10-CM

## 2024-01-29 PROCEDURE — 99309 SBSQ NF CARE MODERATE MDM 30: CPT | Performed by: NURSE PRACTITIONER

## 2024-01-29 NOTE — LETTER
"    1/29/2024        RE: Julisa Chaney  [address]  [place] [address]        [facility] GERIATRICS    Chief Complaint   Patient presents with     RECHECK     HPI:  Julisa Chaney is a 78 year old  (1945), who is being seen today for an episodic care visit at: [facility] (TCU) [[id number]]. [facility] stay 1/17/24 through 1/24/24.   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 She was at this TCU in Oct/Nov after a hospitalization for norovirus. When she discharged from this TCU, she was still having loose stools, advised to take imodium. She presented to the ED 1/17/24 due to ongoing diarrhea that never resolved. She was admitted with dehydration, hypotension, and electrolyte abnormalities. She had a colonoscopy on 1/19/24, biopsy showed collagenous colitis. She was started on budesonide and high fiber diet.     Today's concern is:   Patient is feeling discouraged today. She says that stool is just running out of her right now. She thought it would be better by now. No nausea or vomiting. No abdominal pain or distension. She is also upset that she keep getting chocolate on her food tray even though her dietary slip says she is allergic. Other than these issues, she is doing ok.   -120s/50-60s  HR 70s    Allergies, and PMH/PSH reviewed in [facility] today.  REVIEW OF SYSTEMS:  4 point ROS including Respiratory, CV, GI and , other than that noted in the HPI,  is negative    Objective:   /58   Pulse 70   Temp 98  F (36.7  C)   Resp 16   Ht 1.676 m (5' 6\")   Wt 47.1 kg (103 lb 12.8 oz)   SpO2 90%   BMI 16.75 kg/m    GENERAL APPEARANCE:  Alert, in no distress, thin  ENT:  Mouth and posterior oropharynx normal, moist mucous membranes  RESP:  no respiratory distress  CV:  no edema  ABDOMEN:  soft, non-tender  PSYCH:  oriented X 3, sad    Recent labs in [facility] reviewed by me today. "     Assessment/Plan:  (R53.81) Physical deconditioning  (primary encounter diagnosis)  Comment: Severe due to progressive decline related to persistent diarrhea  Plan: PT/OT eval and treat, discharge planning per their recommendations.    (K5.831) Collagenous colitis  Comment: Confirmed by biopsy. She is receiving the budesonide. Would expect diarrhea to slowly improve. Reminded patient that she can take imodium as well  Plan: Continue current POC with no changes at this time and adjustments as needed.    (G89.4) Chronic pain syndrome  Comment: Chronic condition being managed with medications and is currently asymptomatic.  Plan: Continue current POC with no changes at this time and adjustments as needed.        Electronically signed by: DANIELLE Villanueva CNP           Sincerely,        DANIELLE Villanueva CNP

## 2024-01-29 NOTE — PROGRESS NOTES
"[facility] GERIATRICS    Chief Complaint   Patient presents with    RECHECK     HPI:  Julisa Chaney is a 78 year old  (1945), who is being seen today for an episodic care visit at: [facility] (TCU) [[id number]]. [facility] stay 1/17/24 through 1/24/24.   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 She was at this TCU in Oct/Nov after a hospitalization for norovirus. When she discharged from this TCU, she was still having loose stools, advised to take imodium. She presented to the ED 1/17/24 due to ongoing diarrhea that never resolved. She was admitted with dehydration, hypotension, and electrolyte abnormalities. She had a colonoscopy on 1/19/24, biopsy showed collagenous colitis. She was started on budesonide and high fiber diet.     Today's concern is:   Patient is feeling discouraged today. She says that stool is just running out of her right now. She thought it would be better by now. No nausea or vomiting. No abdominal pain or distension. She is also upset that she keep getting chocolate on her food tray even though her dietary slip says she is allergic. Other than these issues, she is doing ok.   -120s/50-60s  HR 70s    Allergies, and PMH/PSH reviewed in [facility] today.  REVIEW OF SYSTEMS:  4 point ROS including Respiratory, CV, GI and , other than that noted in the HPI,  is negative    Objective:   /58   Pulse 70   Temp 98  F (36.7  C)   Resp 16   Ht 1.676 m (5' 6\")   Wt 47.1 kg (103 lb 12.8 oz)   SpO2 90%   BMI 16.75 kg/m    GENERAL APPEARANCE:  Alert, in no distress, thin  ENT:  Mouth and posterior oropharynx normal, moist mucous membranes  RESP:  no respiratory distress  CV:  no edema  ABDOMEN:  soft, non-tender  PSYCH:  oriented X 3, sad    Recent labs in [facility] reviewed by me today.     Assessment/Plan:  (R53.81) Physical deconditioning  (primary encounter diagnosis)  Comment: Severe due " to progressive decline related to persistent diarrhea  Plan: PT/OT eval and treat, discharge planning per their recommendations.    (K52.021) Collagenous colitis  Comment: Confirmed by biopsy. She is receiving the budesonide. Would expect diarrhea to slowly improve. Reminded patient that she can take imodium as well  Plan: Continue current POC with no changes at this time and adjustments as needed.    (G89.4) Chronic pain syndrome  Comment: Chronic condition being managed with medications and is currently asymptomatic.  Plan: Continue current POC with no changes at this time and adjustments as needed.        Electronically signed by: DANIELLE Villanueva CNP

## 2024-01-30 PROBLEM — R62.7 ADULT FAILURE TO THRIVE: Status: RESOLVED | Noted: 2024-01-17 | Resolved: 2024-01-30

## 2024-01-30 PROBLEM — E83.39 HYPOPHOSPHATEMIA: Status: ACTIVE | Noted: 2024-01-30

## 2024-01-30 PROBLEM — E87.0 HYPERNATREMIA: Status: ACTIVE | Noted: 2024-01-30

## 2024-01-30 PROBLEM — E86.1 HYPOTENSION DUE TO HYPOVOLEMIA: Status: ACTIVE | Noted: 2024-01-30

## 2024-01-30 PROBLEM — K52.9 CHRONIC DIARRHEA: Status: ACTIVE | Noted: 2024-01-30

## 2024-01-30 PROBLEM — K52.831 COLLAGENOUS COLITIS: Status: ACTIVE | Noted: 2024-01-30

## 2024-01-30 PROBLEM — E83.42 HYPOMAGNESEMIA: Status: ACTIVE | Noted: 2024-01-30

## 2024-02-02 ENCOUNTER — LAB REQUISITION (OUTPATIENT)
Dept: LAB | Facility: CLINIC | Age: 79
End: 2024-02-02
Payer: COMMERCIAL

## 2024-02-02 ENCOUNTER — TRANSITIONAL CARE UNIT VISIT (OUTPATIENT)
Dept: GERIATRICS | Facility: CLINIC | Age: 79
End: 2024-02-02
Payer: COMMERCIAL

## 2024-02-02 DIAGNOSIS — F33.3 MAJOR DEPRESSIVE DISORDER, RECURRENT, SEVERE WITH PSYCHOTIC SYMPTOMS (H): ICD-10-CM

## 2024-02-02 DIAGNOSIS — E87.6 HYPOPOTASSEMIA: ICD-10-CM

## 2024-02-02 DIAGNOSIS — K52.831 COLLAGENOUS COLITIS: ICD-10-CM

## 2024-02-02 DIAGNOSIS — R53.81 PHYSICAL DECONDITIONING: Primary | ICD-10-CM

## 2024-02-02 DIAGNOSIS — G50.9 TRIGEMINAL NERVE DISORDER: ICD-10-CM

## 2024-02-02 PROCEDURE — 99309 SBSQ NF CARE MODERATE MDM 30: CPT | Performed by: NURSE PRACTITIONER

## 2024-02-02 NOTE — LETTER
2/2/2024        RE: Julisa Chaney  [address]  [place] [address]        [facility] GERIATRICS    Chief Complaint   Patient presents with     RECHECK     HPI:  Julisa Chaney is a 78 year old  (1945), who is being seen today for an episodic care visit at: [facility] (TCU) [[id number]]. [facility] stay 1/17/24 through 1/24/24.   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 She was at this TCU in Oct/Nov after a hospitalization for norovirus. When she discharged from this TCU, she was still having loose stools, advised to take imodium. She presented to the ED 1/17/24 due to ongoing diarrhea that never resolved. She was admitted with dehydration, hypotension, and electrolyte abnormalities. She had a colonoscopy on 1/19/24, biopsy showed collagenous colitis. She was started on budesonide and high fiber diet.     Today's concern is:   Order was given to schedule imodium, this likely started 2/1/24. She is still having watery diarrhea throughout the day. She cannot tell when it is coming, she is incontinent. Her daughter sent a My Chart message with her concerns about this. Julisa is feeling very discouraged. She was hoping that the diarrhea was getting better. She does not see [facility] until April. She is working with therapy. When she was here previously, care home was recommended, but she declined. She is considering it, but has a lot to do at her house before she is ready to move.     Allergies, and PMH/PSH reviewed in EPIC today.  REVIEW OF SYSTEMS:  4 point ROS including Respiratory, CV, GI and , other than that noted in the HPI,  is negative    Objective:   /54   Pulse 75   Temp 97.4  F (36.3  C)   Resp 16   GENERAL APPEARANCE:  Alert, in no distress, thin  ENT:  Mouth and posterior oropharynx normal, moist mucous membranes  RESP:  no respiratory distress  ABDOMEN:  soft, non-tender  PSYCH:   oriented X 3, affect and mood normal    Recent labs in [facility] reviewed by me today.     Assessment/Plan:  (R53.81) Physical deconditioning  (primary encounter diagnosis)  Comment: Improving, but agree with her daughter that if her diarrhea continues as it is, she may not be safe to go home. She would be high risk for skin breakdown, falls, dehydration, electrolyte imbalance  Plan: PT/OT eval and treat, discharge planning per their recommendations.    (K50.831) Collagenous colitis  Comment: Diarrhea ongoing. Scheduled imodium with a taper every few days to avoid bowel obstruction, but can increase dosing again if needed. Would expect diarrhea to improve with budesonide. It sounds as though she was having diarrhea at home despite taking imodium, so may need to try lomotil if no improvement with imodium  Plan: Continue current POC with no changes at this time and adjustments as needed.    (E87.6) Hypopotassemia  Comment: Patient was receiving replacement in the hospital. Expect that this continues due to diarrhea  Plan: Kcl 20meq BID. BMP 2/5/24        Electronically signed by: DANIELLE Villanueva CNP           Sincerely,        DANIELLE Villanueva CNP

## 2024-02-03 VITALS
RESPIRATION RATE: 16 BRPM | DIASTOLIC BLOOD PRESSURE: 54 MMHG | TEMPERATURE: 97.4 F | HEART RATE: 75 BPM | SYSTOLIC BLOOD PRESSURE: 103 MMHG

## 2024-02-03 PROBLEM — R19.7 DIARRHEA, UNSPECIFIED TYPE: Status: RESOLVED | Noted: 2023-10-22 | Resolved: 2024-02-03

## 2024-02-03 RX ORDER — POTASSIUM CHLORIDE 1500 MG/1
20 TABLET, EXTENDED RELEASE ORAL 2 TIMES DAILY
Start: 2024-02-03 | End: 2024-02-05 | Stop reason: DRUGHIGH

## 2024-02-04 RX ORDER — MULTIVITAMIN
1 TABLET ORAL DAILY
Qty: 100 TABLET | Refills: 3 | Status: SHIPPED | OUTPATIENT
Start: 2024-02-04 | End: 2024-09-26

## 2024-02-05 ENCOUNTER — TRANSITIONAL CARE UNIT VISIT (OUTPATIENT)
Dept: GERIATRICS | Facility: CLINIC | Age: 79
End: 2024-02-05
Payer: COMMERCIAL

## 2024-02-05 ENCOUNTER — TELEPHONE (OUTPATIENT)
Dept: GERIATRICS | Facility: CLINIC | Age: 79
End: 2024-02-05

## 2024-02-05 VITALS
RESPIRATION RATE: 18 BRPM | HEART RATE: 78 BPM | OXYGEN SATURATION: 90 % | SYSTOLIC BLOOD PRESSURE: 108 MMHG | WEIGHT: 99.8 LBS | DIASTOLIC BLOOD PRESSURE: 60 MMHG | HEIGHT: 66 IN | TEMPERATURE: 97.6 F | BODY MASS INDEX: 16.04 KG/M2

## 2024-02-05 DIAGNOSIS — K52.831 COLLAGENOUS COLITIS: Primary | ICD-10-CM

## 2024-02-05 DIAGNOSIS — E87.6 HYPOPOTASSEMIA: ICD-10-CM

## 2024-02-05 DIAGNOSIS — R53.81 PHYSICAL DECONDITIONING: ICD-10-CM

## 2024-02-05 LAB
ANION GAP SERPL CALCULATED.3IONS-SCNC: 10 MMOL/L (ref 7–15)
BUN SERPL-MCNC: 11.5 MG/DL (ref 8–23)
CALCIUM SERPL-MCNC: 9.3 MG/DL (ref 8.8–10.2)
CHLORIDE SERPL-SCNC: 104 MMOL/L (ref 98–107)
CREAT SERPL-MCNC: 0.58 MG/DL (ref 0.51–0.95)
DEPRECATED HCO3 PLAS-SCNC: 26 MMOL/L (ref 22–29)
EGFRCR SERPLBLD CKD-EPI 2021: >90 ML/MIN/1.73M2
GLUCOSE SERPL-MCNC: 79 MG/DL (ref 70–99)
POTASSIUM SERPL-SCNC: 3 MMOL/L (ref 3.4–5.3)
SODIUM SERPL-SCNC: 140 MMOL/L (ref 135–145)

## 2024-02-05 PROCEDURE — 99309 SBSQ NF CARE MODERATE MDM 30: CPT | Performed by: NURSE PRACTITIONER

## 2024-02-05 PROCEDURE — 36415 COLL VENOUS BLD VENIPUNCTURE: CPT | Performed by: INTERNAL MEDICINE

## 2024-02-05 PROCEDURE — 82565 ASSAY OF CREATININE: CPT | Performed by: INTERNAL MEDICINE

## 2024-02-05 RX ORDER — POTASSIUM CHLORIDE 1500 MG/1
20 TABLET, EXTENDED RELEASE ORAL DAILY
COMMUNITY
Start: 2024-02-05 | End: 2024-03-17

## 2024-02-05 RX ORDER — DIPHENOXYLATE HCL/ATROPINE 2.5-.025MG
1 TABLET ORAL
Qty: 90 TABLET | Refills: 0 | Status: SHIPPED | OUTPATIENT
Start: 2024-02-05 | End: 2024-02-12

## 2024-02-05 NOTE — TELEPHONE ENCOUNTER
[facility] Geriatrics Lab Note     Provider: DANIELLE Mcrae CNP   Facility: [facility] Facility Type:  TCU    Allergies   Allergen Reactions    Contrast [Iohexol] Rash     Noticed during 08/2020 admission. Received IV contrast on 8/5    Chocolate Headache    Contrast Dye Rash    Penicillins Rash       Labs Reviewed by provider: EDUARD     Verbal Order/Direction given by Provider: Increase Kcl to 20meq TID. Recheck potassium 2/9/24     Provider giving Order:  DANIELLE Mcrae CNP     Verbal Order given to: Dara Pablo RN

## 2024-02-05 NOTE — PROGRESS NOTES
"[facility] GERIATRICS    Chief Complaint   Patient presents with    RECHECK     HPI:  Julisa Chaney is a 78 year old  (1945), who is being seen today for an episodic care visit at: [facility] (TCU) [[id number]]. [facility] stay 1/17/24 through 1/24/24. 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 She was at this TCU in Oct/Nov after a hospitalization for norovirus. When she discharged from this TCU, she was still having loose stools, advised to take imodium. She presented to the ED 1/17/24 due to ongoing diarrhea that never resolved. She was admitted with dehydration, hypotension, and electrolyte abnormalities. She had a colonoscopy on 1/19/24, biopsy showed collagenous colitis. She was started on budesonide and high fiber diet.     Today's concern is:   Imodium has been scheduled TID for several days. Patient reports that over the weekend, stool continued to run out of her almost continuously. Now today she has not had a bowel movement yet. She did eat breakfast and typically eating will trigger diarrhea. Her daughter arrives during the visit and says that throughout these months of diarrhea, she occasionally has a day or two where it seems like things are improving. She does not see [facility] until April.  Julisa is worried about losing her strength again. There is no therapy over the weekend. She did some walking with staff into her bathroom, but did feel very weak.   -120s/50-60s  HR 70-80s    Allergies, and PMH/PSH reviewed in EPIC today.  REVIEW OF SYSTEMS:  4 point ROS including Respiratory, CV, GI and , other than that noted in the HPI,  is negative    Objective:   /60   Pulse 78   Temp 97.6  F (36.4  C)   Resp 18   Ht 1.676 m (5' 6\")   Wt 45.3 kg (99 lb 12.8 oz)   SpO2 90%   BMI 16.11 kg/m    GENERAL APPEARANCE:  Alert, in no distress, thin  EYES:  EOM normal, conjunctiva and lids " normal  RESP:  no respiratory distress  CV:  no edema  ABDOMEN:  soft, non-tender  PSYCH:  oriented X 3, affect and mood normal    Recent labs in [facility] reviewed by me today.     Assessment/Plan:  (K59.930) Collagenous colitis  (primary encounter diagnosis)  Comment: Ongoing diarrhea despite use of imodium. Will try changing to lomotil.   Plan: Discontinue imodium. Lomotil 1 tab TID with meals. Hold lunch and supper doses if no BM that day.    (R53.81) Physical deconditioning  Comment: Due to prolonged illness  Plan: PT/OT eval and treat, discharge planning per their recommendations.    (E87.6) Hypopotassemia  Comment: Kcl supplement was started last week. After visit, labs resulted showing K 3.0  Plan: Increase Kcl to TID. Recheck K 2/9      Orders:  Discontinue imodium  Lomotil 1 tab TID with meals, hold lunch and supper doses if no BM that day  Increase KCL to 20meq TID    Electronically signed by: Tori Funez, DANIELLE CNP

## 2024-02-05 NOTE — LETTER
2/5/2024        RE: Julisa Chaney  [address]  [place] [address]        M [facility] GERIATRICS    Chief Complaint   Patient presents with    RECHECK     HPI:  Julisa Chaney is a 78 year old  (1945), who is being seen today for an episodic care visit at: No question data found.. Today's concern is: ***    Allergies, and PMH/PSH reviewed in [facility] today.  REVIEW OF SYSTEMS:  {tnadfg37:378427}    Objective:   There were no vitals taken for this visit.  {group home physical exam :194556}    {fgslab:280260}    Assessment/Plan:  {FGS DX2:943748}    MED REC REQUIRED{TIP  Click the link below to document or use med rec list, use list to pull in response :513752}  Post Medication Reconciliation Status: {MED REC LIST:391597}      Orders:  {fgsorders:381108}  ***    Electronically signed by: Flavia Dumont ***           Sincerely,        DANIELLE Villanueva CNP

## 2024-02-05 NOTE — LETTER
"    2/5/2024        RE: Julisa Chaney  [address]  [place] [address]        [facility] GERIATRICS    Chief Complaint   Patient presents with     RECHECK     HPI:  Julisa Chaney is a 78 year old  (1945), who is being seen today for an episodic care visit at: [facility] (TCU) [[id number]]. [facility] stay 1/17/24 through 1/24/24. 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 She was at this TCU in Oct/Nov after a hospitalization for norovirus. When she discharged from this TCU, she was still having loose stools, advised to take imodium. She presented to the ED 1/17/24 due to ongoing diarrhea that never resolved. She was admitted with dehydration, hypotension, and electrolyte abnormalities. She had a colonoscopy on 1/19/24, biopsy showed collagenous colitis. She was started on budesonide and high fiber diet.     Today's concern is:   Imodium has been scheduled TID for several days. Patient reports that over the weekend, stool continued to run out of her almost continuously. Now today she has not had a bowel movement yet. She did eat breakfast and typically eating will trigger diarrhea. Her daughter arrives during the visit and says that throughout these months of diarrhea, she occasionally has a day or two where it seems like things are improving. She does not see [facility] until April.  Julisa is worried about losing her strength again. There is no therapy over the weekend. She did some walking with staff into her bathroom, but did feel very weak.   -120s/50-60s  HR 70-80s    Allergies, and PMH/PSH reviewed in EPIC today.  REVIEW OF SYSTEMS:  4 point ROS including Respiratory, CV, GI and , other than that noted in the HPI,  is negative    Objective:   /60   Pulse 78   Temp 97.6  F (36.4  C)   Resp 18   Ht 1.676 m (5' 6\")   Wt 45.3 kg (99 lb 12.8 oz)   SpO2 90%   BMI 16.11 kg/m    GENERAL " APPEARANCE:  Alert, in no distress, thin  EYES:  EOM normal, conjunctiva and lids normal  RESP:  no respiratory distress  CV:  no edema  ABDOMEN:  soft, non-tender  PSYCH:  oriented X 3, affect and mood normal    Recent labs in [facility] reviewed by me today.     Assessment/Plan:  (K54.431) Collagenous colitis  (primary encounter diagnosis)  Comment: Ongoing diarrhea despite use of imodium. Will try changing to lomotil.   Plan: Discontinue imodium. Lomotil 1 tab TID with meals. Hold lunch and supper doses if no BM that day.    (R53.81) Physical deconditioning  Comment: Due to prolonged illness  Plan: PT/OT eval and treat, discharge planning per their recommendations.    (E87.6) Hypopotassemia  Comment: Kcl supplement was started last week. After visit, labs resulted showing K 3.0  Plan: Increase Kcl to TID. Recheck K 2/9      Orders:  Discontinue imodium  Lomotil 1 tab TID with meals, hold lunch and supper doses if no BM that day  Increase KCL to 20meq TID    Electronically signed by: DANIELLE Villanueva CNP           Sincerely,        DANIELLE Villanueva CNP

## 2024-02-08 ENCOUNTER — LAB REQUISITION (OUTPATIENT)
Dept: LAB | Facility: CLINIC | Age: 79
End: 2024-02-08
Payer: COMMERCIAL

## 2024-02-08 ENCOUNTER — TRANSITIONAL CARE UNIT VISIT (OUTPATIENT)
Dept: GERIATRICS | Facility: CLINIC | Age: 79
End: 2024-02-08
Payer: COMMERCIAL

## 2024-02-08 VITALS
HEIGHT: 66 IN | SYSTOLIC BLOOD PRESSURE: 106 MMHG | WEIGHT: 99.8 LBS | HEART RATE: 67 BPM | RESPIRATION RATE: 18 BRPM | DIASTOLIC BLOOD PRESSURE: 62 MMHG | TEMPERATURE: 97.6 F | BODY MASS INDEX: 16.04 KG/M2 | OXYGEN SATURATION: 90 %

## 2024-02-08 DIAGNOSIS — G89.4 CHRONIC PAIN SYNDROME: ICD-10-CM

## 2024-02-08 DIAGNOSIS — R19.7 DIARRHEA, UNSPECIFIED TYPE: ICD-10-CM

## 2024-02-08 DIAGNOSIS — R53.81 PHYSICAL DECONDITIONING: ICD-10-CM

## 2024-02-08 DIAGNOSIS — G40.909 SEIZURE DISORDER (H): ICD-10-CM

## 2024-02-08 DIAGNOSIS — D50.9 IRON DEFICIENCY ANEMIA, UNSPECIFIED IRON DEFICIENCY ANEMIA TYPE: ICD-10-CM

## 2024-02-08 DIAGNOSIS — G50.0 TRIGEMINAL NEURALGIA: ICD-10-CM

## 2024-02-08 DIAGNOSIS — E87.6 HYPOKALEMIA: ICD-10-CM

## 2024-02-08 DIAGNOSIS — F33.0 MILD RECURRENT MAJOR DEPRESSION (H): ICD-10-CM

## 2024-02-08 DIAGNOSIS — K52.831 COLLAGENOUS COLITIS: Primary | ICD-10-CM

## 2024-02-08 DIAGNOSIS — E46 PROTEIN-CALORIE MALNUTRITION, UNSPECIFIED SEVERITY (H): ICD-10-CM

## 2024-02-08 PROCEDURE — 99305 1ST NF CARE MODERATE MDM 35: CPT | Performed by: INTERNAL MEDICINE

## 2024-02-08 NOTE — LETTER
"    2/8/2024        RE: Julisa Chaney  [address]  [place] [address]        [facility] GERIATRICS  INITIAL VISIT NOTE  February 8, 2024    PRIMARY CARE PROVIDER AND CLINIC:  Mara Murillo [address] / [place] [address]    M [facility] Medical Record Number:  [id number]  Place of Service where encounter took place:  [facility] (U) [[id number]]    Chief Complaint   Patient presents with     Hospital F/U       HPI:    Julisa Chaney is a 78 year old  (1945) female seen today at [facility] TCU. Medical history is notable for brain abscess s/p ventriculostomy (2022), seizure disorder, trigeminal neuralgia, chronic pain and chronic diarrhea. She was in this TCU in July with an aspiration PNA and in October/November after a series of hospitalization with diarrhea. More recently, she was hospitalized at [facility] from 1/17/24 to 1/24/24 where she presented with hypotension in setting of diarrhea. Colonoscopy (1/19/24) with collagenous colitis. GI recommended a tapering dose of steroids, starting budesonide and follow up in 3 months. She had multiple electrolyte abnormalities on admission that were replaced. She was admitted to this facility for  rehab, medical management, and nursing care.      History obtained from: facility chart records, facility staff, patient report, and [facility] chart review.      Today, Ms. Chaney is seen in her room sitting up in bed watching TV. She was just finishing breakfast and had eaten most of her food. The addition of Lamotil earlier this week has helped. She said she still has loose stools when it happens, but it's \"not all the time every hour\" like it was.  No chest pain. No trouble breathing. No acute concerns per discussion with nursing. She is working with therapies.     CODE STATUS: CPR/Full code     ALLERGIES:  Allergies   Allergen Reactions     Contrast [Iohexol] Rash     Noticed during 08/2020 admission. Received IV " contrast on 8/5     Chocolate Headache     Contrast Dye Rash     Penicillins Rash       PAST MEDICAL HISTORY:   Past Medical History:   Diagnosis Date     Aspiration pneumonia (H) 02/2022     Depression      High cholesterol      Migraines      Pyloric ulcer, chronic      Sepsis (H) 08/10/2020     Trigeminal neuralgia        PAST SURGICAL HISTORY:   Past Surgical History:   Procedure Laterality Date     COLONOSCOPY N/A 1/19/2024    Procedure: COLONOSCOPY WITH RANDOM BIOPSIES;  Surgeon: Antonio Mcelroy MD, MD;  Location: [facility]     HYSTERECTOMY       IR GASTROSTOMY TUBE PERCUTANEOUS PLCMNT  8/16/2022     PICC TRIPLE LUMEN PLACEMENT  7/22/2022          FL ESOPHAGOGASTRODUODENOSCOPY TRANSORAL DIAGNOSTIC N/A 8/13/2020    Procedure: ESOPHAGOGASTRODUODENOSCOPY (EGD);  Surgeon: Nathan Smalls MD;  Location: [facility];  Service: Gastroenterology     FL ESOPHAGOGASTRODUODENOSCOPY TRANSORAL DIAGNOSTIC N/A 8/14/2020    Procedure: ESOPHAGOGASTRODUODENOSCOPY (EGD), PYLORIC DILATION;  Surgeon: Nathan Smalls MD;  Location: [facility];  Service: Gastroenterology     VENTRICULOSTOMY Left 7/31/2022    Procedure: LEFT FRONTAL VENTRICULOSTOMY;  Surgeon: Brady Heredia MD;  Location: [facility] COLONOSCOPY W/WO BRUSH/WASH N/A 8/13/2020    Procedure: COLONOSCOPY WITH POLYPECTOMY;  Surgeon: Nathan Smalls MD;  Location: [facility];  Service: Gastroenterology       FAMILY HISTORY:   Family History   Problem Relation Age of Onset     Cancer Father      Testicular cancer Grandchild 17.00     Breast Cancer Mother      Breast Cancer Sister      Breast Cancer Maternal Aunt      Breast Cancer Sister      SOCIAL HISTORY:   Patient's living condition: lives alone    MEDICATIONS:  Post Discharge Medication Reconciliation Status: discharge medications reconciled and changed, per note/orders.  Current Outpatient Medications   Medication Sig Dispense Refill     budesonide (ENTOCORT EC) 3 MG EC capsule  Take 3 capsules (9 mg) by mouth every morning for 42 days, THEN 2 capsules (6 mg) every morning for 14 days, THEN 1 capsule (3 mg) every morning for 14 days.       diphenoxylate-atropine (LOMOTIL) 2.5-0.025 MG tablet Take 1 tablet by mouth 3 times daily (before meals) 90 tablet 0     ferrous sulfate (FEROSUL) 325 (65 Fe) MG tablet Take 1 tablet (325 mg) by mouth daily (with breakfast) 90 tablet 1     gabapentin (NEURONTIN) 100 MG capsule Take 100 mg by mouth daily       gabapentin (NEURONTIN) 100 MG capsule Take 300 mg by mouth at bedtime       levETIRAcetam (KEPPRA) 750 MG tablet Take 1 tablet (750 mg) by mouth 2 times daily 60 tablet 0     meclizine (ANTIVERT) 12.5 MG tablet Take 12.5 mg by mouth 2 times daily       mirtazapine (REMERON) 15 MG tablet Take 1 tablet (15 mg) by mouth at bedtime 30 tablet 0     Multiple Vitamin (MULTIVITAMIN) TABS TAKE 1 TABLET BY MOUTH EVERY  tablet 3     nortriptyline (PAMELOR) 50 MG capsule Take 1 capsule (50 mg) by mouth at bedtime 90 capsule 3     omeprazole (PRILOSEC) 40 MG DR capsule Take 1 capsule (40 mg) by mouth daily 90 capsule 1     OXcarbazepine (TRILEPTAL) 150 MG tablet TAKE 3 TABLETS(450 MG) BY MOUTH DAILY 270 tablet 1     polyvinyl alcohol (ARTIFICIAL TEARS) 1.4 % ophthalmic solution Place 1 drop into both eyes every hour as needed for dry eyes 60 mL 0     potassium chloride ER (KLOR-CON M) 20 MEQ CR tablet Take 20 mEq by mouth daily       psyllium (METAMUCIL) 58.6 % packet Take 1 packet by mouth daily       QUEtiapine (SEROQUEL) 50 MG tablet Take 1 tablet (50 mg) by mouth at bedtime 30 tablet 0     topiramate (TOPAMAX) 50 MG tablet Take 1 tablet (50 mg) by mouth at bedtime 30 tablet 0     Vitamin D3 50 mcg (2000 units) tablet Take 1 tablet (50 mcg) by mouth daily 90 tablet 1     Wound Dressings (TRIAD HYDROPHILIC WOUND DRESSI) PSTE Use for wound care dressing daily 170 g 1     acetaminophen-codeine (TYLENOL #3) 300-30 MG per tablet Take 1 tablet by mouth every  "6 hours as needed for severe pain 30 tablet 0       ROS:  6 point ROS neg other than the symptoms noted above in the HPI.    PHYSICAL EXAM:  /62   Pulse 67   Temp 97.6  F (36.4  C)   Resp 18   Ht 1.676 m (5' 6\")   Wt 45.3 kg (99 lb 12.8 oz)   SpO2 90%   BMI 16.11 kg/m    Gen: sitting in up in bed alert, cooperative and in no acute distress  Resp: breathing non labored, no tachypnea   Ext: no LE edema  Neuro: CX II-XII grossly in tact; ROM in all four extremities grossly in tact  Psych: alert and oriented to self and general situation     LABORATORY/IMAGING DATA:  Reviewed as per [facility] and/or [facility]    ASSESSMENT/PLAN:    Collagenous Colitis - NEW Diagnosis  Chronic Diarrhea   Completed prednisone taper. Immodium changed to Lomotil with good results.   -- budesonide taper per Epic  -- Lamotil 1 tab TID   -- daily fiber   -- follow up with GI as scheduled    Fe Deficiency Anemia  Baseline Hgb 13 range late last year, now 10 range.   -- FeSO4 325 mg daily      Trigeminal Neuralgia  Seizure Disorder   Chronic Pain Syndrome  History of Brain Abscess s/p Ventriculostomy (2022)  -- gabapentin 100 mg with breakfast and 300 mg at bedtime, nortriptyline 50 mg at bedtime, oxcarbazepine 450 mg daily and topiramate 50 mg at bedtime   -- levetiracetam 750 mg BID, meclizine 12.5 mg BID  -- APAP w/ codeine 1 tab q6h PRN     Mild Major Recurrent Depression  Mood and spirits were good today.   -- mirtazapine 15 mg at bedtime and quetiapine 50 mg at bedtime  -- supportive cares     Protein Calorie Malnutrition  In setting of above - she's lost about 7 lbs since November TCU stay.   -- nutrition following      Physical Deconditioning  In setting of hospitalization and underlying medical conditions  -- ongoing PT/OT      Electronically signed by:  Clementine Berumen MD                          Sincerely,        Clementine Berumen MD      "

## 2024-02-08 NOTE — PROGRESS NOTES
"[facility] GERIATRICS  INITIAL VISIT NOTE  February 8, 2024    PRIMARY CARE PROVIDER AND CLINIC:  Mara Murillo [address] [facility] [address]    [facility] Medical Record Number:  [id number]  Place of Service where encounter took place:  [facility] (U) [[id number]]    Chief Complaint   Patient presents with    Hospital F/U       HPI:    Julisa Chaney is a 78 year old  (1945) female seen today at [facility] TCU. Medical history is notable for brain abscess s/p ventriculostomy (2022), seizure disorder, trigeminal neuralgia, chronic pain and chronic diarrhea. She was in this TCU in July with an aspiration PNA and in October/November after a series of hospitalization with diarrhea. More recently, she was hospitalized at [facility] from 1/17/24 to 1/24/24 where she presented with hypotension in setting of diarrhea. Colonoscopy (1/19/24) with collagenous colitis. GI recommended a tapering dose of steroids, starting budesonide and follow up in 3 months. She had multiple electrolyte abnormalities on admission that were replaced. She was admitted to this facility for  rehab, medical management, and nursing care.      History obtained from: facility chart records, facility staff, patient report, and [facility] chart review.      Today, Ms. Chaney is seen in her room sitting up in bed watching TV. She was just finishing breakfast and had eaten most of her food. The addition of Lamotil earlier this week has helped. She said she still has loose stools when it happens, but it's \"not all the time every hour\" like it was.  No chest pain. No trouble breathing. No acute concerns per discussion with nursing. She is working with therapies.     CODE STATUS: CPR/Full code     ALLERGIES:  Allergies   Allergen Reactions    Contrast [Iohexol] Rash     Noticed during 08/2020 admission. Received IV contrast on 8/5    Chocolate Headache    Contrast Dye Rash    Penicillins Rash       PAST MEDICAL " HISTORY:   Past Medical History:   Diagnosis Date    Aspiration pneumonia (H) 02/2022    Depression     High cholesterol     Migraines     Pyloric ulcer, chronic     Sepsis (H) 08/10/2020    Trigeminal neuralgia        PAST SURGICAL HISTORY:   Past Surgical History:   Procedure Laterality Date    COLONOSCOPY N/A 1/19/2024    Procedure: COLONOSCOPY WITH RANDOM BIOPSIES;  Surgeon: Antonio Mcelroy MD, MD;  Location: [facility]    HYSTERECTOMY      IR GASTROSTOMY TUBE PERCUTANEOUS PLCMNT  8/16/2022    PICC TRIPLE LUMEN PLACEMENT  7/22/2022         CT ESOPHAGOGASTRODUODENOSCOPY TRANSORAL DIAGNOSTIC N/A 8/13/2020    Procedure: ESOPHAGOGASTRODUODENOSCOPY (EGD);  Surgeon: Nathan Smalls MD;  Location: [facility];  Service: Gastroenterology    CT ESOPHAGOGASTRODUODENOSCOPY TRANSORAL DIAGNOSTIC N/A 8/14/2020    Procedure: ESOPHAGOGASTRODUODENOSCOPY (EGD), PYLORIC DILATION;  Surgeon: Nathan Smalls MD;  Location: [facility];  Service: Gastroenterology    VENTRICULOSTOMY Left 7/31/2022    Procedure: LEFT FRONTAL VENTRICULOSTOMY;  Surgeon: Brady Herdeia MD;  Location: [facility] COLONOSCOPY W/WO BRUSH/WASH N/A 8/13/2020    Procedure: COLONOSCOPY WITH POLYPECTOMY;  Surgeon: Nathan Smalls MD;  Location: [facility];  Service: Gastroenterology       FAMILY HISTORY:   Family History   Problem Relation Age of Onset    Cancer Father     Testicular cancer Grandchild 17.00    Breast Cancer Mother     Breast Cancer Sister     Breast Cancer Maternal Aunt     Breast Cancer Sister      SOCIAL HISTORY:   Patient's living condition: lives alone    MEDICATIONS:  Post Discharge Medication Reconciliation Status: discharge medications reconciled and changed, per note/orders.  Current Outpatient Medications   Medication Sig Dispense Refill    budesonide (ENTOCORT EC) 3 MG EC capsule Take 3 capsules (9 mg) by mouth every morning for 42 days, THEN 2 capsules (6 mg) every morning for 14 days, THEN 1 capsule  (3 mg) every morning for 14 days.      diphenoxylate-atropine (LOMOTIL) 2.5-0.025 MG tablet Take 1 tablet by mouth 3 times daily (before meals) 90 tablet 0    ferrous sulfate (FEROSUL) 325 (65 Fe) MG tablet Take 1 tablet (325 mg) by mouth daily (with breakfast) 90 tablet 1    gabapentin (NEURONTIN) 100 MG capsule Take 100 mg by mouth daily      gabapentin (NEURONTIN) 100 MG capsule Take 300 mg by mouth at bedtime      levETIRAcetam (KEPPRA) 750 MG tablet Take 1 tablet (750 mg) by mouth 2 times daily 60 tablet 0    meclizine (ANTIVERT) 12.5 MG tablet Take 12.5 mg by mouth 2 times daily      mirtazapine (REMERON) 15 MG tablet Take 1 tablet (15 mg) by mouth at bedtime 30 tablet 0    Multiple Vitamin (MULTIVITAMIN) TABS TAKE 1 TABLET BY MOUTH EVERY  tablet 3    nortriptyline (PAMELOR) 50 MG capsule Take 1 capsule (50 mg) by mouth at bedtime 90 capsule 3    omeprazole (PRILOSEC) 40 MG DR capsule Take 1 capsule (40 mg) by mouth daily 90 capsule 1    OXcarbazepine (TRILEPTAL) 150 MG tablet TAKE 3 TABLETS(450 MG) BY MOUTH DAILY 270 tablet 1    polyvinyl alcohol (ARTIFICIAL TEARS) 1.4 % ophthalmic solution Place 1 drop into both eyes every hour as needed for dry eyes 60 mL 0    potassium chloride ER (KLOR-CON M) 20 MEQ CR tablet Take 20 mEq by mouth daily      psyllium (METAMUCIL) 58.6 % packet Take 1 packet by mouth daily      QUEtiapine (SEROQUEL) 50 MG tablet Take 1 tablet (50 mg) by mouth at bedtime 30 tablet 0    topiramate (TOPAMAX) 50 MG tablet Take 1 tablet (50 mg) by mouth at bedtime 30 tablet 0    Vitamin D3 50 mcg (2000 units) tablet Take 1 tablet (50 mcg) by mouth daily 90 tablet 1    Wound Dressings (TRIAD HYDROPHILIC WOUND DRESSI) PSTE Use for wound care dressing daily 170 g 1    acetaminophen-codeine (TYLENOL #3) 300-30 MG per tablet Take 1 tablet by mouth every 6 hours as needed for severe pain 30 tablet 0       ROS:  6 point ROS neg other than the symptoms noted above in the HPI.    PHYSICAL EXAM:  BP  "106/62   Pulse 67   Temp 97.6  F (36.4  C)   Resp 18   Ht 1.676 m (5' 6\")   Wt 45.3 kg (99 lb 12.8 oz)   SpO2 90%   BMI 16.11 kg/m    Gen: sitting in up in bed alert, cooperative and in no acute distress  Resp: breathing non labored, no tachypnea   Ext: no LE edema  Neuro: CX II-XII grossly in tact; ROM in all four extremities grossly in tact  Psych: alert and oriented to self and general situation     LABORATORY/IMAGING DATA:  Reviewed as per [facility] and/or [facility]    ASSESSMENT/PLAN:    Collagenous Colitis - NEW Diagnosis  Chronic Diarrhea   Completed prednisone taper. Immodium changed to Lomotil with good results.   -- budesonide taper per Epic  -- Lamotil 1 tab TID   -- daily fiber   -- follow up with GI as scheduled    Fe Deficiency Anemia  Baseline Hgb 13 range late last year, now 10 range.   -- FeSO4 325 mg daily      Trigeminal Neuralgia  Seizure Disorder   Chronic Pain Syndrome  History of Brain Abscess s/p Ventriculostomy (2022)  -- gabapentin 100 mg with breakfast and 300 mg at bedtime, nortriptyline 50 mg at bedtime, oxcarbazepine 450 mg daily and topiramate 50 mg at bedtime   -- levetiracetam 750 mg BID, meclizine 12.5 mg BID  -- APAP w/ codeine 1 tab q6h PRN     Mild Major Recurrent Depression  Mood and spirits were good today.   -- mirtazapine 15 mg at bedtime and quetiapine 50 mg at bedtime  -- supportive cares     Protein Calorie Malnutrition  In setting of above - she's lost about 7 lbs since November TCU stay.   -- nutrition following      Physical Deconditioning  In setting of hospitalization and underlying medical conditions  -- ongoing PT/OT      Electronically signed by:  Clementine Berumen MD                      "

## 2024-02-09 ENCOUNTER — TELEPHONE (OUTPATIENT)
Dept: GERIATRICS | Facility: CLINIC | Age: 79
End: 2024-02-09
Payer: COMMERCIAL

## 2024-02-09 DIAGNOSIS — G89.4 CHRONIC PAIN SYNDROME: ICD-10-CM

## 2024-02-09 DIAGNOSIS — G50.0 TRIGEMINAL NEURALGIA: ICD-10-CM

## 2024-02-09 LAB — POTASSIUM SERPL-SCNC: 4.8 MMOL/L (ref 3.4–5.3)

## 2024-02-09 PROCEDURE — 84132 ASSAY OF SERUM POTASSIUM: CPT | Performed by: INTERNAL MEDICINE

## 2024-02-09 PROCEDURE — P9603 ONE-WAY ALLOW PRORATED MILES: HCPCS | Performed by: INTERNAL MEDICINE

## 2024-02-09 PROCEDURE — 36415 COLL VENOUS BLD VENIPUNCTURE: CPT | Performed by: INTERNAL MEDICINE

## 2024-02-09 RX ORDER — ACETAMINOPHEN AND CODEINE PHOSPHATE 300; 30 MG/1; MG/1
1 TABLET ORAL EVERY 6 HOURS PRN
Qty: 30 TABLET | Refills: 0 | Status: SHIPPED | OUTPATIENT
Start: 2024-02-09 | End: 2024-03-18

## 2024-02-09 NOTE — TELEPHONE ENCOUNTER
Telephone order given to nurse Rose Enriquez RN on 2/9/2024 at 3:44 PM     ----- Message from DANIELLE Villanueva CNP sent at 2/9/2024  2:45 PM CST -----  Change KCL to 20meq daily.

## 2024-02-12 ENCOUNTER — LAB REQUISITION (OUTPATIENT)
Dept: LAB | Facility: CLINIC | Age: 79
End: 2024-02-12
Payer: COMMERCIAL

## 2024-02-12 ENCOUNTER — DISCHARGE SUMMARY NURSING HOME (OUTPATIENT)
Dept: GERIATRICS | Facility: CLINIC | Age: 79
End: 2024-02-12
Payer: COMMERCIAL

## 2024-02-12 VITALS
HEIGHT: 66 IN | DIASTOLIC BLOOD PRESSURE: 62 MMHG | HEART RATE: 70 BPM | SYSTOLIC BLOOD PRESSURE: 110 MMHG | RESPIRATION RATE: 18 BRPM | BODY MASS INDEX: 16.04 KG/M2 | OXYGEN SATURATION: 93 % | WEIGHT: 99.8 LBS | TEMPERATURE: 97.6 F

## 2024-02-12 DIAGNOSIS — R53.81 PHYSICAL DECONDITIONING: ICD-10-CM

## 2024-02-12 DIAGNOSIS — E87.6 HYPOPOTASSEMIA: ICD-10-CM

## 2024-02-12 DIAGNOSIS — K52.831 COLLAGENOUS COLITIS: Primary | ICD-10-CM

## 2024-02-12 DIAGNOSIS — G50.0 TRIGEMINAL NEURALGIA: ICD-10-CM

## 2024-02-12 DIAGNOSIS — E87.6 HYPOKALEMIA: ICD-10-CM

## 2024-02-12 DIAGNOSIS — E46 MALNUTRITION, UNSPECIFIED TYPE (H): ICD-10-CM

## 2024-02-12 PROCEDURE — 99316 NF DSCHRG MGMT 30 MIN+: CPT | Performed by: NURSE PRACTITIONER

## 2024-02-12 RX ORDER — DIPHENOXYLATE HCL/ATROPINE 2.5-.025MG
1 TABLET ORAL 4 TIMES DAILY
Qty: 60 TABLET | Refills: 0 | Status: SHIPPED | OUTPATIENT
Start: 2024-02-12 | End: 2024-04-15

## 2024-02-12 NOTE — PROGRESS NOTES
[facility] GERIATRICS DISCHARGE SUMMARY  PATIENT'S NAME: Julisa Chaney  YOB: 1945  MEDICAL RECORD NUMBER:  [id number]  Place of Service where encounter took place:  [facility] (TCU) [[id number]]    PRIMARY CARE PROVIDER AND CLINIC RESPONSIBLE AFTER TRANSFER:   Mara Murilol MD, [address] [facility] [address]    [facility] [place]     Transferring providers: DANIELLE Villanueva CNP, Clementine Berumen MD  Recent Hospitalization/ED:  Hospital  [facility] stay 1/17/24 to 1/24/24.  Date of SNF Admission: January 24, 2024  Date of SNF (anticipated) Discharge: February 15, 2024  Discharged to: previous independent home      CODE STATUS/ADVANCE DIRECTIVES DISCUSSION:  Full Code   ALLERGIES: Contrast [iohexol], Chocolate, Contrast dye, and Penicillins    NURSING FACILITY COURSE   Medication Changes/Rationale:   Imodium stopped  Lomotil started for diarrhea  KCL started for hypopotassemia    Summary of nursing facility stay:   Julisa Chaney  is a 78 year old  (1945), admitted to the above facility from  [facility] stay 1/17/24 through 1/24/24.   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 She was at this TCU in Oct/Nov after a hospitalization for norovirus. When she discharged from this TCU, she was still having loose stools, advised to take imodium. She presented to the ED 1/17/24 due to ongoing diarrhea that never resolved. She was admitted with dehydration, hypotension, and electrolyte abnormalities. She had a colonoscopy on 1/19/24, biopsy showed collagenous colitis. She was started on budesonide and high fiber diet.     Collagenous colitis  Patient continued to have watery diarrhea throughout the day, even when imodium was scheduled. Imodium was changed to lomotil last week. She says her diarrhea is not gone, but it is no longer continuous and is less watery. She is still  incontinent, but less often. She is still having more difficulty at night, order was given today to increase lomotil to QID. She does not follow up with [facility] until April.   She speculates today that her dental health may be contributing to her GI issues as well. Provider agrees and urges her to make a dental appointment as soon as she is able. If she is concerned about the diarrhea, she could take 2 lomotil prior to going.    Physical deconditioning  Improving. Insurance coverage is ending 2/14, she will discharge home 2/15. She feels that she will be able to manage at home.    Hypopotassemia  Due to diarrhea. Supplement was reduced 2/9/24. K will be rechecked prior to discharge, will update if any changes    Trigeminal neuralgia  Chronic condition being managed with medications. No acute flares while at [facility]    Malnutrition, unspecified type (H24)  Body mass index is 16.11 kg/m . She has found it difficult to eat enough when she was struggling with the diarrhea. This has improved, but her food choices are limited here. She anticipates her intake will improve at home.        Discharge Medications:  Current Outpatient Medications   Medication Sig Dispense Refill    acetaminophen-codeine (TYLENOL #3) 300-30 MG per tablet Take 1 tablet by mouth every 6 hours as needed for severe pain 30 tablet 0    budesonide (ENTOCORT EC) 3 MG EC capsule Take 3 capsules (9 mg) by mouth every morning for 42 days, THEN 2 capsules (6 mg) every morning for 14 days, THEN 1 capsule (3 mg) every morning for 14 days.      diphenoxylate-atropine (LOMOTIL) 2.5-0.025 MG tablet Take 1 tablet by mouth 4 times daily 60 tablet 0    ferrous sulfate (FEROSUL) 325 (65 Fe) MG tablet Take 1 tablet (325 mg) by mouth daily (with breakfast) 90 tablet 1    gabapentin (NEURONTIN) 100 MG capsule Take 100 mg by mouth daily      gabapentin (NEURONTIN) 100 MG capsule Take 300 mg by mouth at bedtime      levETIRAcetam (KEPPRA) 750 MG tablet Take 1 tablet (750 mg)  "by mouth 2 times daily 60 tablet 0    meclizine (ANTIVERT) 12.5 MG tablet Take 12.5 mg by mouth 2 times daily      mirtazapine (REMERON) 15 MG tablet Take 1 tablet (15 mg) by mouth at bedtime 30 tablet 0    Multiple Vitamin (MULTIVITAMIN) TABS TAKE 1 TABLET BY MOUTH EVERY  tablet 3    nortriptyline (PAMELOR) 50 MG capsule Take 1 capsule (50 mg) by mouth at bedtime 90 capsule 3    omeprazole (PRILOSEC) 40 MG DR capsule Take 1 capsule (40 mg) by mouth daily 90 capsule 1    OXcarbazepine (TRILEPTAL) 150 MG tablet TAKE 3 TABLETS(450 MG) BY MOUTH DAILY 270 tablet 1    polyvinyl alcohol (ARTIFICIAL TEARS) 1.4 % ophthalmic solution Place 1 drop into both eyes every hour as needed for dry eyes 60 mL 0    potassium chloride ER (KLOR-CON M) 20 MEQ CR tablet Take 20 mEq by mouth daily      psyllium (METAMUCIL) 58.6 % packet Take 1 packet by mouth daily      QUEtiapine (SEROQUEL) 50 MG tablet Take 1 tablet (50 mg) by mouth at bedtime 30 tablet 0    topiramate (TOPAMAX) 50 MG tablet Take 1 tablet (50 mg) by mouth at bedtime 30 tablet 0    Vitamin D3 50 mcg (2000 units) tablet Take 1 tablet (50 mcg) by mouth daily 90 tablet 1    Wound Dressings (TRIAD HYDROPHILIC WOUND DRESSI) PSTE Use for wound care dressing daily 170 g 1        Controlled medications:   OK to send remaining lomotil and Tylenol #3     Past Medical History:   Past Medical History:   Diagnosis Date    Aspiration pneumonia (H) 02/2022    Depression     High cholesterol     Migraines     Pyloric ulcer, chronic     Sepsis (H) 08/10/2020    Trigeminal neuralgia      Physical Exam:   Vitals: /62   Pulse 70   Temp 97.6  F (36.4  C)   Resp 18   Ht 1.676 m (5' 6\")   Wt 45.3 kg (99 lb 12.8 oz)   SpO2 93%   BMI 16.11 kg/m    BMI: Body mass index is 16.11 kg/m .  GENERAL APPEARANCE:  Alert, in no distress, thin  ENT:  edentulous  with denture platethat appears ill-fitting  EYES:  EOM normal, conjunctiva and lids normal  RESP:  no respiratory distress  CV: "  no edema  ABDOMEN:  soft, non-tender  PSYCH:  oriented X 3, memory impaired , affect and mood normal     SNF labs: Labs done in SNF are in [place] EPIC. Please refer to them using EPIC/Care Everywhere.    DISCHARGE PLAN:  Follow up labs: No labs orders/due  Medical Follow Up:      Follow up with primary care provider in 1-2 weeks  Follow up with [facility] as scheduled  Discharge Services: Home Care:  Occupational Therapy, Physical Therapy, Registered Nurse, and Home Health Aide      TOTAL DISCHARGE TIME:   Greater than 30 minutes  Electronically signed by:  DANIELLE Villanueva CNP       Documentation of Face to Face and Certification for Home Health Services    I certify that services are/were furnished while this patient was under the care of a physician and that a physician or an allowed non-physician practitioner (NPP), had a face-to-face encounter that meets the physician face-to-face encounter requirements. The encounter was in whole, or in part, related to the primary reason for home health. The patient is confined to his/her home and needs intermittent skilled nursing, physical therapy, speech-language pathology, or the continued need for occupational therapy. A plan of care has been established by a physician and is periodically reviewed by a physician.  Date of Face-to-Face Encounter: 2/12/2024.    I certify that, based on my findings, the following services are medically necessary home health services: Nursing, Occupational Therapy, and Physical Therapy.    My clinical findings support the need for the above skilled services because: Requires assistance of another person or specialized equipment to access medical services because patient: Is prone to wander/get lost without assistance...    Patient to re-establish plan of care with their PCP within 7-10 days after leaving the facility to reestablish care.  Medicare certified PECOS provider: DANIELLE Villanueva CNP  Date: February 12, 2024

## 2024-02-12 NOTE — LETTER
2/12/2024        RE: Julisa Chaney  [address]  [place] [address]        [facility] GERIATRICS DISCHARGE SUMMARY  PATIENT'S NAME: Julisa Chaney  YOB: 1945  MEDICAL RECORD NUMBER:  [id number]  Place of Service where encounter took place:  [facility] (U) [[id number]]    PRIMARY CARE PROVIDER AND CLINIC RESPONSIBLE AFTER TRANSFER:   Mara Murillo MD, [address] [facility] / [facility] [address]    [facility] [place]     Transferring providers: DANIELLE Villanueva CNP, Clementine Berumen MD  Recent Hospitalization/ED:  Hospital  [facility] stay 1/17/24 to 1/24/24.  Date of SNF Admission: January 24, 2024  Date of SNF (anticipated) Discharge: February 15, 2024  Discharged to: previous independent home      CODE STATUS/ADVANCE DIRECTIVES DISCUSSION:  Full Code   ALLERGIES: Contrast [iohexol], Chocolate, Contrast dye, and Penicillins    NURSING FACILITY COURSE   Medication Changes/Rationale:   Imodium stopped  Lomotil started for diarrhea  KCL started for hypopotassemia    Summary of nursing facility stay:   Julisa Chaney  is a 78 year old  (1945), admitted to the above facility from  [facility] stay 1/17/24 through 1/24/24.   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 She was at this TCU in Oct/Nov after a hospitalization for norovirus. When she discharged from this TCU, she was still having loose stools, advised to take imodium. She presented to the ED 1/17/24 due to ongoing diarrhea that never resolved. She was admitted with dehydration, hypotension, and electrolyte abnormalities. She had a colonoscopy on 1/19/24, biopsy showed collagenous colitis. She was started on budesonide and high fiber diet.     Collagenous colitis  Patient continued to have watery diarrhea throughout the day, even when imodium was scheduled. Imodium was changed to lomotil last week. She says  her diarrhea is not gone, but it is no longer continuous and is less watery. She is still incontinent, but less often. She is still having more difficulty at night, order was given today to increase lomotil to QID. She does not follow up with [facility] until April.   She speculates today that her dental health may be contributing to her GI issues as well. Provider agrees and urges her to make a dental appointment as soon as she is able. If she is concerned about the diarrhea, she could take 2 lomotil prior to going.    Physical deconditioning  Improving. Insurance coverage is ending 2/14, she will discharge home 2/15. She feels that she will be able to manage at home.    Hypopotassemia  Due to diarrhea. Supplement was reduced 2/9/24. K will be rechecked prior to discharge, will update if any changes    Trigeminal neuralgia  Chronic condition being managed with medications. No acute flares while at [facility]    Malnutrition, unspecified type (H24)  Body mass index is 16.11 kg/m . She has found it difficult to eat enough when she was struggling with the diarrhea. This has improved, but her food choices are limited here. She anticipates her intake will improve at home.        Discharge Medications:  Current Outpatient Medications   Medication Sig Dispense Refill     acetaminophen-codeine (TYLENOL #3) 300-30 MG per tablet Take 1 tablet by mouth every 6 hours as needed for severe pain 30 tablet 0     budesonide (ENTOCORT EC) 3 MG EC capsule Take 3 capsules (9 mg) by mouth every morning for 42 days, THEN 2 capsules (6 mg) every morning for 14 days, THEN 1 capsule (3 mg) every morning for 14 days.       diphenoxylate-atropine (LOMOTIL) 2.5-0.025 MG tablet Take 1 tablet by mouth 4 times daily 60 tablet 0     ferrous sulfate (FEROSUL) 325 (65 Fe) MG tablet Take 1 tablet (325 mg) by mouth daily (with breakfast) 90 tablet 1     gabapentin (NEURONTIN) 100 MG capsule Take 100 mg by mouth daily       gabapentin (NEURONTIN) 100 MG capsule  "Take 300 mg by mouth at bedtime       levETIRAcetam (KEPPRA) 750 MG tablet Take 1 tablet (750 mg) by mouth 2 times daily 60 tablet 0     meclizine (ANTIVERT) 12.5 MG tablet Take 12.5 mg by mouth 2 times daily       mirtazapine (REMERON) 15 MG tablet Take 1 tablet (15 mg) by mouth at bedtime 30 tablet 0     Multiple Vitamin (MULTIVITAMIN) TABS TAKE 1 TABLET BY MOUTH EVERY  tablet 3     nortriptyline (PAMELOR) 50 MG capsule Take 1 capsule (50 mg) by mouth at bedtime 90 capsule 3     omeprazole (PRILOSEC) 40 MG DR capsule Take 1 capsule (40 mg) by mouth daily 90 capsule 1     OXcarbazepine (TRILEPTAL) 150 MG tablet TAKE 3 TABLETS(450 MG) BY MOUTH DAILY 270 tablet 1     polyvinyl alcohol (ARTIFICIAL TEARS) 1.4 % ophthalmic solution Place 1 drop into both eyes every hour as needed for dry eyes 60 mL 0     potassium chloride ER (KLOR-CON M) 20 MEQ CR tablet Take 20 mEq by mouth daily       psyllium (METAMUCIL) 58.6 % packet Take 1 packet by mouth daily       QUEtiapine (SEROQUEL) 50 MG tablet Take 1 tablet (50 mg) by mouth at bedtime 30 tablet 0     topiramate (TOPAMAX) 50 MG tablet Take 1 tablet (50 mg) by mouth at bedtime 30 tablet 0     Vitamin D3 50 mcg (2000 units) tablet Take 1 tablet (50 mcg) by mouth daily 90 tablet 1     Wound Dressings (TRIAD HYDROPHILIC WOUND DRESSI) PSTE Use for wound care dressing daily 170 g 1        Controlled medications:   OK to send remaining lomotil and Tylenol #3     Past Medical History:   Past Medical History:   Diagnosis Date     Aspiration pneumonia (H) 02/2022     Depression      High cholesterol      Migraines      Pyloric ulcer, chronic      Sepsis (H) 08/10/2020     Trigeminal neuralgia      Physical Exam:   Vitals: /62   Pulse 70   Temp 97.6  F (36.4  C)   Resp 18   Ht 1.676 m (5' 6\")   Wt 45.3 kg (99 lb 12.8 oz)   SpO2 93%   BMI 16.11 kg/m    BMI: Body mass index is 16.11 kg/m .  GENERAL APPEARANCE:  Alert, in no distress, thin  ENT:  edentulous  with " denture platethat appears ill-fitting  EYES:  EOM normal, conjunctiva and lids normal  RESP:  no respiratory distress  CV:  no edema  ABDOMEN:  soft, non-tender  PSYCH:  oriented X 3, memory impaired , affect and mood normal     SNF labs: Labs done in SNF are in [place] EPIC. Please refer to them using EPIC/Care Everywhere.    DISCHARGE PLAN:  Follow up labs: No labs orders/due  Medical Follow Up:      Follow up with primary care provider in 1-2 weeks  Follow up with [facility] as scheduled  Discharge Services: Home Care:  Occupational Therapy, Physical Therapy, Registered Nurse, and Home Health Aide      TOTAL DISCHARGE TIME:   Greater than 30 minutes  Electronically signed by:  Tori Funez, DANIELLE CNP       Documentation of Face to Face and Certification for Home Health Services    I certify that services are/were furnished while this patient was under the care of a physician and that a physician or an allowed non-physician practitioner (NPP), had a face-to-face encounter that meets the physician face-to-face encounter requirements. The encounter was in whole, or in part, related to the primary reason for home health. The patient is confined to his/her home and needs intermittent skilled nursing, physical therapy, speech-language pathology, or the continued need for occupational therapy. A plan of care has been established by a physician and is periodically reviewed by a physician.  Date of Face-to-Face Encounter: 2/12/2024.    I certify that, based on my findings, the following services are medically necessary home health services: Nursing, Occupational Therapy, and Physical Therapy.    My clinical findings support the need for the above skilled services because: Requires assistance of another person or specialized equipment to access medical services because patient: Is prone to wander/get lost without assistance...    Patient to re-establish plan of care with their PCP within 7-10 days after leaving the facility  to reestablish care.  Medicare certified PECOS provider: DANIELLE Villanueva CNP  Date: February 12, 2024                  Sincerely,        DANIELLE Villanueva CNP

## 2024-02-13 LAB — POTASSIUM SERPL-SCNC: 4 MMOL/L (ref 3.4–5.3)

## 2024-02-13 PROCEDURE — 36415 COLL VENOUS BLD VENIPUNCTURE: CPT | Performed by: INTERNAL MEDICINE

## 2024-02-13 PROCEDURE — P9603 ONE-WAY ALLOW PRORATED MILES: HCPCS | Performed by: INTERNAL MEDICINE

## 2024-02-13 PROCEDURE — 84132 ASSAY OF SERUM POTASSIUM: CPT | Performed by: INTERNAL MEDICINE

## 2024-02-14 ENCOUNTER — DISCHARGE SUMMARY NURSING HOME (OUTPATIENT)
Dept: GERIATRICS | Facility: CLINIC | Age: 79
End: 2024-02-14
Payer: COMMERCIAL

## 2024-02-14 VITALS
TEMPERATURE: 96.8 F | DIASTOLIC BLOOD PRESSURE: 52 MMHG | RESPIRATION RATE: 16 BRPM | OXYGEN SATURATION: 94 % | WEIGHT: 98.4 LBS | HEART RATE: 70 BPM | SYSTOLIC BLOOD PRESSURE: 99 MMHG | HEIGHT: 66 IN | BODY MASS INDEX: 15.81 KG/M2

## 2024-02-14 DIAGNOSIS — R53.81 PHYSICAL DECONDITIONING: Primary | ICD-10-CM

## 2024-02-14 DIAGNOSIS — E87.6 HYPOPOTASSEMIA: ICD-10-CM

## 2024-02-14 DIAGNOSIS — R41.89 COGNITIVE IMPAIRMENT: ICD-10-CM

## 2024-02-14 DIAGNOSIS — K52.831 COLLAGENOUS COLITIS: ICD-10-CM

## 2024-02-14 DIAGNOSIS — G89.4 CHRONIC PAIN SYNDROME: ICD-10-CM

## 2024-02-14 PROCEDURE — 99316 NF DSCHRG MGMT 30 MIN+: CPT | Performed by: NURSE PRACTITIONER

## 2024-02-14 NOTE — PROGRESS NOTES
[facility] GERIATRICS DISCHARGE SUMMARY  PATIENT'S NAME: Julisa Chaney  YOB: 1945  MEDICAL RECORD NUMBER:  [id number]  Place of Service where encounter took place:  [facility] (TCU) [[id number]]    PRIMARY CARE PROVIDER AND CLINIC RESPONSIBLE AFTER TRANSFER:   Mara Murillo MD, [address] [facility] / [facility] [address]    [facility] [place]     Transferring providers: DANIELLE Villanueva CNP, Clementine Berumen MD  Recent Hospitalization/ED:  Hospital  [facility] stay 1/17/24 to 1/24/24.  Date of SNF Admission: January 24, 2024  Date of SNF (anticipated) Discharge: February 15, 2024  Discharged to: previous independent home  Cognitive Scores: SLUMS: 17/30  Physical Function: Ambulating 100 ft with 2WW  DME: No new DME needed    CODE STATUS/ADVANCE DIRECTIVES DISCUSSION:  Full Code   ALLERGIES: Contrast [iohexol], Chocolate, Contrast dye, and Penicillins    NURSING FACILITY COURSE   Medication Changes/Rationale:   Imodium stopped  Lomotil started  Potassium started    Summary of nursing facility stay:   [facility] stay 1/17/24 through 1/24/24. 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 She was at this TCU in Oct/Nov after a hospitalization for norovirus. When she discharged from this TCU, she was still having loose stools, advised to take imodium. She presented to the ED 1/17/24 due to ongoing diarrhea that never resolved. She was admitted with dehydration, hypotension, and electrolyte abnormalities. She had a colonoscopy on 1/19/24, biopsy showed collagenous colitis. She was started on budesonide and high fiber diet.     Physical deconditioning  Improving, but mobility remains limited. Patient will benefit from ongoing home PT/OT    Collagenous colitis  Patient struggled with almost continuous, watery diarrhea even when imodium was scheduled. She has been changed to lomotil, which has  been much more effective. She may also be responding to the budesonide finally. She is now complaining of dark, tarry stools. This is likely due to iron. Her daughter lives close by and checks on her regularly. She is aware of the lomotil and that it should be tapered down if her bowels become formed.     Hypopotassemia  Improving with slowing of diarrhea. Supplement reduced this week. She can likely stop it in the near future    Chronic pain syndrome  Controlled with current regimen. No flare ups of trigeminal neuralgia while here    Cognitive impairment  Moderate impairment per testing, but she does seem to have a good understanding of her medical issues. PHIL recommended, she has not been interested      Discharge Medications:  Current Outpatient Medications   Medication Sig Dispense Refill    acetaminophen-codeine (TYLENOL #3) 300-30 MG per tablet Take 1 tablet by mouth every 6 hours as needed for severe pain 30 tablet 0    budesonide (ENTOCORT EC) 3 MG EC capsule Take 3 capsules (9 mg) by mouth every morning for 42 days, THEN 2 capsules (6 mg) every morning for 14 days, THEN 1 capsule (3 mg) every morning for 14 days.      diphenoxylate-atropine (LOMOTIL) 2.5-0.025 MG tablet Take 1 tablet by mouth 4 times daily 60 tablet 0    ferrous sulfate (FEROSUL) 325 (65 Fe) MG tablet Take 1 tablet (325 mg) by mouth daily (with breakfast) 90 tablet 1    gabapentin (NEURONTIN) 100 MG capsule Take 100 mg by mouth daily      gabapentin (NEURONTIN) 100 MG capsule Take 300 mg by mouth at bedtime      levETIRAcetam (KEPPRA) 750 MG tablet Take 1 tablet (750 mg) by mouth 2 times daily 60 tablet 0    meclizine (ANTIVERT) 12.5 MG tablet Take 12.5 mg by mouth 2 times daily      mirtazapine (REMERON) 15 MG tablet Take 1 tablet (15 mg) by mouth at bedtime 30 tablet 0    Multiple Vitamin (MULTIVITAMIN) TABS TAKE 1 TABLET BY MOUTH EVERY  tablet 3    nortriptyline (PAMELOR) 50 MG capsule Take 1 capsule (50 mg) by mouth at bedtime 90  "capsule 3    omeprazole (PRILOSEC) 40 MG DR capsule Take 1 capsule (40 mg) by mouth daily 90 capsule 1    OXcarbazepine (TRILEPTAL) 150 MG tablet TAKE 3 TABLETS(450 MG) BY MOUTH DAILY 270 tablet 1    polyvinyl alcohol (ARTIFICIAL TEARS) 1.4 % ophthalmic solution Place 1 drop into both eyes every hour as needed for dry eyes 60 mL 0    potassium chloride ER (KLOR-CON M) 20 MEQ CR tablet Take 20 mEq by mouth daily      psyllium (METAMUCIL) 58.6 % packet Take 1 packet by mouth daily      QUEtiapine (SEROQUEL) 50 MG tablet Take 1 tablet (50 mg) by mouth at bedtime 30 tablet 0    topiramate (TOPAMAX) 50 MG tablet Take 1 tablet (50 mg) by mouth at bedtime 30 tablet 0    Vitamin D3 50 mcg (2000 units) tablet Take 1 tablet (50 mcg) by mouth daily 90 tablet 1    Wound Dressings (TRIAD HYDROPHILIC WOUND DRESSI) PSTE Use for wound care dressing daily 170 g 1        Controlled medications:   OK to send remaining lomotil and Tylenol #3     Past Medical History:   Past Medical History:   Diagnosis Date    Aspiration pneumonia (H) 02/2022    Depression     High cholesterol     Migraines     Pyloric ulcer, chronic     Sepsis (H) 08/10/2020    Trigeminal neuralgia      Physical Exam:   Vitals: BP 99/52   Pulse 70   Temp 96.8  F (36  C)   Resp 16   Ht 1.676 m (5' 6\")   Wt 44.6 kg (98 lb 6.4 oz)   SpO2 94%   BMI 15.88 kg/m    BMI: Body mass index is 15.88 kg/m .  GENERAL APPEARANCE:  Alert, in no distress, thin  ENT:  Mouth and posterior oropharynx normal, moist mucous membranes  EYES:  EOM normal, conjunctiva and lids normal  RESP:  no respiratory distress  CV:  no edema  ABDOMEN:  soft, non-tender  PSYCH:  oriented X 3, memory impaired      SNF labs: Labs done in SNF are in [place] EPIC. Please refer to them using [facility]/Care Everywhere.    DISCHARGE PLAN:  Follow up labs: No labs orders/due  Medical Follow Up:      Follow up with primary care provider in 1-2 weeks   Follow up with [facility] April 16th  Discharge Services: Home " Care:  Occupational Therapy, Physical Therapy, Registered Nurse, and Home Health Aide      TOTAL DISCHARGE TIME:   Greater than 30 minutes  Electronically signed by:  DANIELLE Villanueva CNP       Documentation of Face to Face and Certification for Home Health Services    I certify that services are/were furnished while this patient was under the care of a physician and that a physician or an allowed non-physician practitioner (NPP), had a face-to-face encounter that meets the physician face-to-face encounter requirements. The encounter was in whole, or in part, related to the primary reason for home health. The patient is confined to his/her home and needs intermittent skilled nursing, physical therapy, speech-language pathology, or the continued need for occupational therapy. A plan of care has been established by a physician and is periodically reviewed by a physician.  Date of Face-to-Face Encounter: 2/14/2024.    I certify that, based on my findings, the following services are medically necessary home health services: Nursing, Occupational Therapy, and Physical Therapy.    My clinical findings support the need for the above skilled services because: Requires assistance of another person or specialized equipment to access medical services because patient: Is prone to wander/get lost without assistance...    Patient to re-establish plan of care with their PCP within 7-10 days after leaving the facility to reestablish care.  Medicare certified PECOS provider: DANIELLE Villnaueva CNP  Date: February 14, 2024

## 2024-02-14 NOTE — LETTER
2/14/2024        RE: Julisa Chaney  [address]  [place] [address]        [facility] GERIATRICS DISCHARGE SUMMARY  PATIENT'S NAME: Julisa Chaney  YOB: 1945  MEDICAL RECORD NUMBER:  [id number]  Place of Service where encounter took place:  [facility] (TCU) [[id number]]    PRIMARY CARE PROVIDER AND CLINIC RESPONSIBLE AFTER TRANSFER:   Mara Murillo MD, [address] [facility] [address]    [facility] [place] [place] Provider     Transferring providers: DANIELLE Villanueva CNP, Clementine Berumen MD  Recent Hospitalization/ED:  Hospital  [facility] stay 1/17/24 to 1/24/24.  Date of SNF Admission: January 24, 2024  Date of SNF (anticipated) Discharge: February 15, 2024  Discharged to: previous independent home  Cognitive Scores: SLUMS: 17/30  Physical Function: Ambulating 100 ft with 2WW  DME: No new DME needed    CODE STATUS/ADVANCE DIRECTIVES DISCUSSION:  Full Code   ALLERGIES: Contrast [iohexol], Chocolate, Contrast dye, and Penicillins    NURSING FACILITY COURSE   Medication Changes/Rationale:   Imodium stopped  Lomotil started  Potassium started    Summary of nursing facility stay:   [facility] stay 1/17/24 through 1/24/24. 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 She was at this TCU in Oct/Nov after a hospitalization for norovirus. When she discharged from this TCU, she was still having loose stools, advised to take imodium. She presented to the ED 1/17/24 due to ongoing diarrhea that never resolved. She was admitted with dehydration, hypotension, and electrolyte abnormalities. She had a colonoscopy on 1/19/24, biopsy showed collagenous colitis. She was started on budesonide and high fiber diet.     Physical deconditioning  Improving, but mobility remains limited. Patient will benefit from ongoing home PT/OT    Collagenous colitis  Patient struggled with almost continuous,  watery diarrhea even when imodium was scheduled. She has been changed to lomotil, which has been much more effective. She may also be responding to the budesonide finally. She is now complaining of dark, tarry stools. This is likely due to iron. Her daughter lives close by and checks on her regularly. She is aware of the lomotil and that it should be tapered down if her bowels become formed.     Hypopotassemia  Improving with slowing of diarrhea. Supplement reduced this week. She can likely stop it in the near future    Chronic pain syndrome  Controlled with current regimen. No flare ups of trigeminal neuralgia while here    Cognitive impairment  Moderate impairment per testing, but she does seem to have a good understanding of her medical issues. senior care recommended, she has not been interested      Discharge Medications:  Current Outpatient Medications   Medication Sig Dispense Refill     acetaminophen-codeine (TYLENOL #3) 300-30 MG per tablet Take 1 tablet by mouth every 6 hours as needed for severe pain 30 tablet 0     budesonide (ENTOCORT EC) 3 MG EC capsule Take 3 capsules (9 mg) by mouth every morning for 42 days, THEN 2 capsules (6 mg) every morning for 14 days, THEN 1 capsule (3 mg) every morning for 14 days.       diphenoxylate-atropine (LOMOTIL) 2.5-0.025 MG tablet Take 1 tablet by mouth 4 times daily 60 tablet 0     ferrous sulfate (FEROSUL) 325 (65 Fe) MG tablet Take 1 tablet (325 mg) by mouth daily (with breakfast) 90 tablet 1     gabapentin (NEURONTIN) 100 MG capsule Take 100 mg by mouth daily       gabapentin (NEURONTIN) 100 MG capsule Take 300 mg by mouth at bedtime       levETIRAcetam (KEPPRA) 750 MG tablet Take 1 tablet (750 mg) by mouth 2 times daily 60 tablet 0     meclizine (ANTIVERT) 12.5 MG tablet Take 12.5 mg by mouth 2 times daily       mirtazapine (REMERON) 15 MG tablet Take 1 tablet (15 mg) by mouth at bedtime 30 tablet 0     Multiple Vitamin (MULTIVITAMIN) TABS TAKE 1 TABLET BY MOUTH EVERY  " tablet 3     nortriptyline (PAMELOR) 50 MG capsule Take 1 capsule (50 mg) by mouth at bedtime 90 capsule 3     omeprazole (PRILOSEC) 40 MG DR capsule Take 1 capsule (40 mg) by mouth daily 90 capsule 1     OXcarbazepine (TRILEPTAL) 150 MG tablet TAKE 3 TABLETS(450 MG) BY MOUTH DAILY 270 tablet 1     polyvinyl alcohol (ARTIFICIAL TEARS) 1.4 % ophthalmic solution Place 1 drop into both eyes every hour as needed for dry eyes 60 mL 0     potassium chloride ER (KLOR-CON M) 20 MEQ CR tablet Take 20 mEq by mouth daily       psyllium (METAMUCIL) 58.6 % packet Take 1 packet by mouth daily       QUEtiapine (SEROQUEL) 50 MG tablet Take 1 tablet (50 mg) by mouth at bedtime 30 tablet 0     topiramate (TOPAMAX) 50 MG tablet Take 1 tablet (50 mg) by mouth at bedtime 30 tablet 0     Vitamin D3 50 mcg (2000 units) tablet Take 1 tablet (50 mcg) by mouth daily 90 tablet 1     Wound Dressings (TRIAD HYDROPHILIC WOUND DRESSI) PSTE Use for wound care dressing daily 170 g 1        Controlled medications:   OK to send remaining lomotil and Tylenol #3     Past Medical History:   Past Medical History:   Diagnosis Date     Aspiration pneumonia (H) 02/2022     Depression      High cholesterol      Migraines      Pyloric ulcer, chronic      Sepsis (H) 08/10/2020     Trigeminal neuralgia      Physical Exam:   Vitals: BP 99/52   Pulse 70   Temp 96.8  F (36  C)   Resp 16   Ht 1.676 m (5' 6\")   Wt 44.6 kg (98 lb 6.4 oz)   SpO2 94%   BMI 15.88 kg/m    BMI: Body mass index is 15.88 kg/m .  GENERAL APPEARANCE:  Alert, in no distress, thin  ENT:  Mouth and posterior oropharynx normal, moist mucous membranes  EYES:  EOM normal, conjunctiva and lids normal  RESP:  no respiratory distress  CV:  no edema  ABDOMEN:  soft, non-tender  PSYCH:  oriented X 3, memory impaired      SNF labs: Labs done in SNF are in [place] EPIC. Please refer to them using EPIC/Care Everywhere.    DISCHARGE PLAN:  Follow up labs: No labs orders/due  Medical Follow " Up:      Follow up with primary care provider in 1-2 weeks   Follow up with [facility] April 16th  Discharge Services: Home Care:  Occupational Therapy, Physical Therapy, Registered Nurse, and Home Health Aide      TOTAL DISCHARGE TIME:   Greater than 30 minutes  Electronically signed by:  DANIELLE Villanueva CNP       Documentation of Face to Face and Certification for Home Health Services    I certify that services are/were furnished while this patient was under the care of a physician and that a physician or an allowed non-physician practitioner (NPP), had a face-to-face encounter that meets the physician face-to-face encounter requirements. The encounter was in whole, or in part, related to the primary reason for home health. The patient is confined to his/her home and needs intermittent skilled nursing, physical therapy, speech-language pathology, or the continued need for occupational therapy. A plan of care has been established by a physician and is periodically reviewed by a physician.  Date of Face-to-Face Encounter: 2/14/2024.    I certify that, based on my findings, the following services are medically necessary home health services: Nursing, Occupational Therapy, and Physical Therapy.    My clinical findings support the need for the above skilled services because: Requires assistance of another person or specialized equipment to access medical services because patient: Is prone to wander/get lost without assistance...    Patient to re-establish plan of care with their PCP within 7-10 days after leaving the facility to reestablish care.  Medicare certified PECOS provider: DANIELLE Villanueva CNP  Date: February 14, 2024                  Sincerely,        DANIELLE Villanueva CNP

## 2024-02-20 ENCOUNTER — PRE VISIT (OUTPATIENT)
Dept: GASTROENTEROLOGY | Facility: CLINIC | Age: 79
End: 2024-02-20

## 2024-02-21 ENCOUNTER — PATIENT OUTREACH (OUTPATIENT)
Dept: CARE COORDINATION | Facility: CLINIC | Age: 79
End: 2024-02-21
Payer: COMMERCIAL

## 2024-02-21 NOTE — PROGRESS NOTES
Clinic Care Coordination Contact  [facility]/Voicemail    Clinical Data: Care Coordinator Outreach    Outreach Documentation Number of Outreach Attempt   2/21/2024   2:50 PM 1       Left message on patient's voicemail with call back information and requested return call.    Plan: Care Coordinator will try to reach patient again in 3-5 business days.      UT x 1  TCU discharge 2/15.  See transition charting 1/25/24.

## 2024-02-27 ENCOUNTER — MYC MEDICAL ADVICE (OUTPATIENT)
Dept: FAMILY MEDICINE | Facility: CLINIC | Age: 79
End: 2024-02-27
Payer: COMMERCIAL

## 2024-02-27 DIAGNOSIS — K52.831 COLITIS, COLLAGENOUS: ICD-10-CM

## 2024-02-28 RX ORDER — BUDESONIDE 3 MG/1
CAPSULE, COATED PELLETS ORAL
Qty: 42 CAPSULE | Refills: 0 | Status: SHIPPED | OUTPATIENT
Start: 2024-02-28 | End: 2024-04-15

## 2024-02-28 NOTE — TELEPHONE ENCOUNTER
Routing refill request to provider for review/approval because:  Medication is reported/historical    Pt will start 2 capsules on 3/6/24. Order pended for review for accuracy and signature.    Last Written Prescription Date:  1/25/24  Transitional care order   Last office visit: 1/11/2024

## 2024-02-29 ENCOUNTER — MYC REFILL (OUTPATIENT)
Dept: FAMILY MEDICINE | Facility: CLINIC | Age: 79
End: 2024-02-29
Payer: COMMERCIAL

## 2024-02-29 ENCOUNTER — TELEPHONE (OUTPATIENT)
Dept: FAMILY MEDICINE | Facility: CLINIC | Age: 79
End: 2024-02-29
Payer: COMMERCIAL

## 2024-02-29 DIAGNOSIS — K52.831 COLITIS, COLLAGENOUS: ICD-10-CM

## 2024-02-29 RX ORDER — BUDESONIDE 3 MG/1
CAPSULE, COATED PELLETS ORAL
Qty: 42 CAPSULE | Refills: 0 | OUTPATIENT
Start: 2024-02-29 | End: 2024-03-27

## 2024-02-29 NOTE — TELEPHONE ENCOUNTER
2-29-24  Daughter Alissa called in regards to the PA, I stated it can take up to 10 business days,except our PA department is running behind so its now taking longer.  Alissa stated pt is out of meds 3-4.  Dr Murillo is the detrimental to her health/DX, please follow with family  Jan

## 2024-03-01 ENCOUNTER — PATIENT OUTREACH (OUTPATIENT)
Dept: CARE COORDINATION | Facility: CLINIC | Age: 79
End: 2024-03-01
Payer: COMMERCIAL

## 2024-03-01 NOTE — PROGRESS NOTES
Clinic Care Coordination Contact  [facility]/Voicemail    Clinical Data: Care Coordinator Outreach    Outreach Documentation Number of Outreach Attempt   2/21/2024   2:50 PM 1   3/1/2024   2:33 PM 2       Left message on patient's voicemail with call back information and requested return call.-  Instructed patient to call and schedule a hospital follow up appointment with Dr. Murillo.    Plan: Care Coordinator will send unable to contact letter with care coordinator contact information via mail. Care Coordinator will do no further outreaches at this time.    [facility] x 2

## 2024-03-01 NOTE — LETTER
[facility] CARE COORDINATION  [address] [facility] [place] [address]    March 7, 2024    Julisa Chaney  [address] [place] [address]      Dear Julisa,    I am a clinic care coordinator who works with Mara Murillo MD with the [facility]. I have been trying to reach you recently to introduce Clinic Care Coordination. Below is a description of clinic care coordination and how I can further assist you.       The clinic care coordination team is made up of a registered nurse, , financial resource worker and community health worker who understand the health care system. The goal of clinic care coordination is to help you manage your health and improve access to the health care system. Our team works alongside your provider to assist you in determining your health and social needs. We can help you obtain health care and community resources, providing you with necessary information and education. We can work with you through any barriers and develop a care plan that helps coordinate and strengthen the communication between you and your care team.  Our services are voluntary and are offered without charge to you personally.    Please feel free to contact me with any questions or concerns regarding care coordination and what we can offer.      We are focused on providing you with the highest-quality healthcare experience possible.    Sincerely,     Aleshia Moraes RN  Clinic Care Coordination  [phone]

## 2024-03-03 ENCOUNTER — MYC REFILL (OUTPATIENT)
Dept: FAMILY MEDICINE | Facility: CLINIC | Age: 79
End: 2024-03-03
Payer: COMMERCIAL

## 2024-03-03 DIAGNOSIS — G51.8 FACIAL NEURALGIA: ICD-10-CM

## 2024-03-03 DIAGNOSIS — G47.00 PERSISTENT INSOMNIA: ICD-10-CM

## 2024-03-04 ENCOUNTER — HOSPITAL ENCOUNTER (EMERGENCY)
Facility: HOSPITAL | Age: 79
Discharge: HOME | End: 2024-03-04
Attending: EMERGENCY MEDICINE | Admitting: EMERGENCY MEDICINE
Payer: COMMERCIAL

## 2024-03-04 VITALS
OXYGEN SATURATION: 97 % | TEMPERATURE: 98 F | DIASTOLIC BLOOD PRESSURE: 71 MMHG | BODY MASS INDEX: 16.07 KG/M2 | WEIGHT: 100 LBS | HEART RATE: 96 BPM | HEIGHT: 66 IN | SYSTOLIC BLOOD PRESSURE: 124 MMHG | RESPIRATION RATE: 20 BRPM

## 2024-03-04 DIAGNOSIS — G47.00 PERSISTENT INSOMNIA: ICD-10-CM

## 2024-03-04 DIAGNOSIS — T85.628A: ICD-10-CM

## 2024-03-04 LAB
ANION GAP SERPL CALCULATED.3IONS-SCNC: 10 MMOL/L (ref 7–15)
BUN SERPL-MCNC: 12 MG/DL (ref 8–23)
CALCIUM SERPL-MCNC: 9.6 MG/DL (ref 8.8–10.2)
CHLORIDE SERPL-SCNC: 107 MMOL/L (ref 98–107)
CREAT SERPL-MCNC: 0.59 MG/DL (ref 0.51–0.95)
DEPRECATED HCO3 PLAS-SCNC: 25 MMOL/L (ref 22–29)
EGFRCR SERPLBLD CKD-EPI 2021: >90 ML/MIN/1.73M2
GLUCOSE SERPL-MCNC: 171 MG/DL (ref 70–99)
MAGNESIUM SERPL-MCNC: 1.7 MG/DL (ref 1.7–2.3)
POTASSIUM SERPL-SCNC: 4 MMOL/L (ref 3.4–5.3)
SODIUM SERPL-SCNC: 142 MMOL/L (ref 135–145)

## 2024-03-04 PROCEDURE — 258N000003 HC RX IP 258 OP 636: Performed by: EMERGENCY MEDICINE

## 2024-03-04 PROCEDURE — 96360 HYDRATION IV INFUSION INIT: CPT

## 2024-03-04 PROCEDURE — 83735 ASSAY OF MAGNESIUM: CPT | Performed by: EMERGENCY MEDICINE

## 2024-03-04 PROCEDURE — 99283 EMERGENCY DEPT VISIT LOW MDM: CPT | Mod: 25

## 2024-03-04 PROCEDURE — 36415 COLL VENOUS BLD VENIPUNCTURE: CPT | Performed by: EMERGENCY MEDICINE

## 2024-03-04 PROCEDURE — 84295 ASSAY OF SERUM SODIUM: CPT | Performed by: EMERGENCY MEDICINE

## 2024-03-04 RX ORDER — OXCARBAZEPINE 150 MG/1
TABLET, FILM COATED ORAL
Qty: 270 TABLET | Refills: 1 | Status: SHIPPED | OUTPATIENT
Start: 2024-03-04 | End: 2024-07-08

## 2024-03-04 RX ORDER — LEVETIRACETAM 750 MG/1
750 TABLET ORAL 2 TIMES DAILY
Qty: 60 TABLET | Refills: 0 | Status: SHIPPED | OUTPATIENT
Start: 2024-03-04 | End: 2024-06-27

## 2024-03-04 RX ORDER — LEVETIRACETAM 750 MG/1
750 TABLET ORAL 2 TIMES DAILY
Qty: 180 TABLET | OUTPATIENT
Start: 2024-03-04

## 2024-03-04 RX ADMIN — SODIUM CHLORIDE 1000 ML: 9 INJECTION, SOLUTION INTRAVENOUS at 12:15

## 2024-03-04 ASSESSMENT — COLUMBIA-SUICIDE SEVERITY RATING SCALE - C-SSRS
2. HAVE YOU ACTUALLY HAD ANY THOUGHTS OF KILLING YOURSELF IN THE PAST MONTH?: NO
6. HAVE YOU EVER DONE ANYTHING, STARTED TO DO ANYTHING, OR PREPARED TO DO ANYTHING TO END YOUR LIFE?: NO
1. IN THE PAST MONTH, HAVE YOU WISHED YOU WERE DEAD OR WISHED YOU COULD GO TO SLEEP AND NOT WAKE UP?: NO

## 2024-03-04 NOTE — ED TRIAGE NOTES
Patient with permanent dental appliance on upper front teeth that has loosen, and dislodged in mouth. Appliance is now sticking outside of mouth. She did have an appointment with dentist today at 1100, daughter concerned about dehydration because of dislodged teeth and would like patient hydrated before going to dentist. Has been having difficulty drinking for last 4 days

## 2024-03-04 NOTE — ED PROVIDER NOTES
EMERGENCY DEPARTMENT ENCOUNTER     NAME: Julisa Chaney   AGE: 78 year old female   YOB: 1945   MRN: [id number]   EVALUATION DATE & TIME: No admission date for patient encounter.   PCP: Mara Murillo     Chief Complaint   Patient presents with    Dental Problem   :    FINAL IMPRESSION       1. Malposition of dental implant           ED COURSE & MEDICAL DECISION MAKING    11:47 AM I met with the patient to gather history and to perform my initial exam. I discussed the plan for care while in the Emergency Department.   1:14 PM We discussed the plan for discharge and the patient is agreeable. Reviewed supportive cares, symptomatic treatment, outpatient follow up, and reasons to return to the Emergency Department. All questions and concerns were addressed. Patient to be discharged by ED RN.     Pertinent Labs & Imaging studies reviewed. (See chart for details)   78 year old female  presents to the Emergency Department for evaluation of concern about dehydration.  Patient has a dental implant in her front teeth that is malpositioned and hanging out the front of her mouth.  They had an emergency dental appointment to get it fixed this morning, but her daughter was concerned that because she cannot get the bottle of water in her mouth normally that she had gotten dehydrated or had electrolyte abnormalities and wanted this assessed before they go to the appointment. Initial Vitals Reviewed. Initial exam notable for patient who has normal vitals and does have a dental implant that is broken and the left side of the upper teeth is hanging out of her mouth.  Mucous membranes do not appear significantly dry, and we did do a BMP and check electrolytes.  There are no severe deficiencies or any signs of acute kidney injury.  She was given a liter of IV fluids here, and her daughters been able to reschedule the emergency dental appointment to get the implant fixed which will solve the issue.  They are comfortable  with discharge.         At the conclusion of the encounter I discussed the results of all of the tests and the disposition. The questions were answered. The patient or family acknowledged understanding and was agreeable with the care plan.     0 minutes critical care time, see procedure note below for details if relevant    Medical Decision Making    History:  Supplemental history from: Documented in chart and Family Member/Significant Other  External Record(s) reviewed: Documented in chart, previous hypokalemia on 2/8/2024, 2/12/2024    Work Up:  Chart documentation includes differential considered and any EKGs or imaging independently interpreted by provider, where specified.  In additional to work up documented, I considered the following work up: Documented in chart, if applicable.    External consultation:  Discussion of management with another provider: Documented in chart, if applicable    Complicating factors:  Care impacted by chronic illness: Hyperlipidemia and Other: sepsis, aspiration pneumonia, seizure disorder  Care affected by social determinants of health: N/A    Disposition considerations: Discharge. No recommendations on prescription strength medication(s). I considered admission, but ultimately discharged patient with reassuring workup and a close outpatient follow-up resolution plan.        MEDICATIONS GIVEN IN THE EMERGENCY:   Medications   sodium chloride 0.9% BOLUS 1,000 mL (1,000 mLs Intravenous $New Bag 3/4/24 1212)      NEW PRESCRIPTIONS STARTED AT TODAY'S ER VISIT   New Prescriptions    No medications on file     ================================================================   HISTORY OF PRESENT ILLNESS       Patient information was obtained from: patient, patient's daughter    Use of Intrepreter: N/A   Julisa SILVEIRA Ritu is a 78 year old female with history of sepsis, aspiration pneumonia, seizure disorder, hyperlipidemia who presents for dental problem. Patient has a permanent dental  appliance in the upper front teeth that loosened and dislodged. Per patient's daughter, patient has been having difficulty drinking since 3/1/24 due to this problem. Patient had a dentist appointment this morning for this issue but cancelled it due to concerns for dehydration.     ================================================================        PAST HISTORY     PAST MEDICAL HISTORY:   Past Medical History:   Diagnosis Date    Aspiration pneumonia (H) 02/2022    Depression     High cholesterol     Migraines     Pyloric ulcer, chronic     Sepsis (H) 08/10/2020    Trigeminal neuralgia       PAST SURGICAL HISTORY:   Past Surgical History:   Procedure Laterality Date    COLONOSCOPY N/A 1/19/2024    Procedure: COLONOSCOPY WITH RANDOM BIOPSIES;  Surgeon: Antonio Mcelroy MD, MD;  Location: [facility]    HYSTERECTOMY      IR GASTROSTOMY TUBE PERCUTANEOUS PLCMNT  8/16/2022    PICC TRIPLE LUMEN PLACEMENT  7/22/2022         UT ESOPHAGOGASTRODUODENOSCOPY TRANSORAL DIAGNOSTIC N/A 8/13/2020    Procedure: ESOPHAGOGASTRODUODENOSCOPY (EGD);  Surgeon: Nathan Smalls MD;  [place] [facility]  Service: Gastroenterology    UT ESOPHAGOGASTRODUODENOSCOPY TRANSORAL DIAGNOSTIC N/A 8/14/2020    Procedure: ESOPHAGOGASTRODUODENOSCOPY (EGD), PYLORIC DILATION;  Surgeon: Nathan Smalls MD;  [place] [facility] [place]  Service: Gastroenterology    VENTRICULOSTOMY Left 7/31/2022    Procedure: LEFT FRONTAL VENTRICULOSTOMY;  Surgeon: Brady Heredia MD;  Location: [facility] COLONOSCOPY W/WO BRUSH/WASH N/A 8/13/2020    Procedure: COLONOSCOPY WITH POLYPECTOMY;  Surgeon: Nathan Smalls MD;  [place] [facility] [place]  Service: Gastroenterology      CURRENT MEDICATIONS:   acetaminophen-codeine (TYLENOL #3) 300-30 MG per tablet  budesonide (ENTOCORT EC) 3 MG EC capsule  diphenoxylate-atropine (LOMOTIL) 2.5-0.025 MG tablet  ferrous sulfate (FEROSUL) 325 (65 Fe) MG tablet  gabapentin (NEURONTIN) 100 MG  capsule  gabapentin (NEURONTIN) 100 MG capsule  levETIRAcetam (KEPPRA) 750 MG tablet  meclizine (ANTIVERT) 12.5 MG tablet  mirtazapine (REMERON) 15 MG tablet  Multiple Vitamin (MULTIVITAMIN) TABS  nortriptyline (PAMELOR) 50 MG capsule  omeprazole (PRILOSEC) 40 MG DR capsule  OXcarbazepine (TRILEPTAL) 150 MG tablet  polyvinyl alcohol (ARTIFICIAL TEARS) 1.4 % ophthalmic solution  potassium chloride ER (KLOR-CON M) 20 MEQ CR tablet  psyllium (METAMUCIL) 58.6 % packet  QUEtiapine (SEROQUEL) 50 MG tablet  topiramate (TOPAMAX) 50 MG tablet  Vitamin D3 50 mcg (2000 units) tablet  Wound Dressings (TRIAD HYDROPHILIC WOUND DRESSI) PSTE      ALLERGIES:   Allergies   Allergen Reactions    Contrast [Iohexol] Rash     Noticed during 2020 admission. Received IV contrast on     Chocolate Headache    Contrast Dye Rash    Penicillins Rash      FAMILY HISTORY:   Family History   Problem Relation Age of Onset    Cancer Father     Testicular cancer Grandchild 17.00    Breast Cancer Mother     Breast Cancer Sister     Breast Cancer Maternal Aunt     Breast Cancer Sister       SOCIAL HISTORY:   Social History     Socioeconomic History    Marital status:    Tobacco Use    Smoking status: Former     Packs/day: 1.00     Years: 50.00     Additional pack years: 0.00     Total pack years: 50.00     Types: Cigarettes     Quit date: 2014     Years since quittin.3    Smokeless tobacco: Never   Substance and Sexual Activity    Alcohol use: No    Drug use: No   Social History Narrative    Lives alone in the house, relay in TV dinner  grandkids help with house maintenance  Daughter lives close by.  Mara Murillo MD 2018 1:49 PM       Social Determinants of Health     Financial Resource Strain: Low Risk  (2024)    Financial Resource Strain     Within the past 12 months, have you or your family members you live with been unable to get utilities (heat, electricity) when it was really needed?: No   Food Insecurity: Low Risk  " (1/11/2024)    Food Insecurity     Within the past 12 months, did you worry that your food would run out before you got money to buy more?: No     Within the past 12 months, did the food you bought just not last and you didn t have money to get more?: No   Transportation Needs: Low Risk  (1/11/2024)    Transportation Needs     Within the past 12 months, has lack of transportation kept you from medical appointments, getting your medicines, non-medical meetings or appointments, work, or from getting things that you need?: No   Housing Stability: Low Risk  (1/11/2024)    Housing Stability     Do you have housing? : Yes     Are you worried about losing your housing?: No        VITALS  Patient Vitals for the past 24 hrs:   BP Temp Pulse Resp SpO2 Height Weight   03/04/24 1137 124/71 98  F (36.7  C) 96 20 97 % 1.676 m (5' 6\") 45.4 kg (100 lb)        ================================================================    PHYSICAL EXAM     VITAL SIGNS: /71   Pulse 96   Temp 98  F (36.7  C)   Resp 20   Ht 1.676 m (5' 6\")   Wt 45.4 kg (100 lb)   SpO2 97%   BMI 16.14 kg/m     Constitutional:  Chronically ill appearing, sitting in a wheelchair, Awake, no acute distress   HENT:  Dental appliance for top teeth is hanging out of the front of the mouth, oropharynx without exudate or erythema, membranes moist  Lymph:  No adenopathy  Eyes: EOM intact, PERRL, no injection  Neck: Supple  Respiratory:  Clear to auscultation bilaterally, no wheezes or crackles   Cardiovascular:  Regular rate and rhythm, single S1 and S2   GI:  Soft, nontender, nondistended, no rebound or guarding   Musculoskeletal:  Moves all extremities, no lower extremity edema, no deformities    Skin:  Warm, dry  Neurologic:  Alert and oriented x3, no focal deficits noted       ================================================================  LAB       All pertinent labs reviewed and interpreted.   Labs Ordered and Resulted from Time of ED Arrival to Time of " ED Departure   BASIC METABOLIC PANEL - Abnormal       Result Value    Sodium 142      Potassium 4.0      Chloride 107      Carbon Dioxide (CO2) 25      Anion Gap 10      Urea Nitrogen 12.0      Creatinine 0.59      GFR Estimate >90      Calcium 9.6      Glucose 171 (*)    MAGNESIUM - Normal    Magnesium 1.7          ===============================================================  RADIOLOGY       Reviewed all pertinent imaging. Please see official radiology report.   No orders to display         ================================================================  EKG         I have independently reviewed and interpreted the EKG(s) documented above.     ================================================================  PROCEDURES         I, Lorne Bates , am serving as a scribe to document services personally performed by Dr. Rainey based on my observation and the provider's statements to me. I, Donna Rainey MD attest that Lorne Bates  is acting in a scribe capacity, has observed my performance of the services and has documented them in accordance with my direction.   Donna Rainey M.D.   Emergency Medicine   [facility] EMERGENCY DEPARTMENT  [facility] [facility] [address]  [phone]  Dept: [phone]      Donna Rainey MD  03/04/24 7956

## 2024-03-04 NOTE — DISCHARGE INSTRUCTIONS
Fortunately, the labs look reassuring and there are no signs of any significant electrolyte disturbance or dehydration.  Obviously, we need to be sure that she gets the dental implant fixed to soon as possible so that she can drink again and not get dehydrated.

## 2024-03-10 ENCOUNTER — MYC REFILL (OUTPATIENT)
Dept: FAMILY MEDICINE | Facility: CLINIC | Age: 79
End: 2024-03-10
Payer: COMMERCIAL

## 2024-03-10 DIAGNOSIS — G43.719 INTRACTABLE CHRONIC MIGRAINE WITHOUT AURA AND WITHOUT STATUS MIGRAINOSUS: ICD-10-CM

## 2024-03-11 DIAGNOSIS — G43.719 INTRACTABLE CHRONIC MIGRAINE WITHOUT AURA AND WITHOUT STATUS MIGRAINOSUS: ICD-10-CM

## 2024-03-11 RX ORDER — TOPIRAMATE 50 MG/1
50 TABLET, FILM COATED ORAL AT BEDTIME
Qty: 90 TABLET | OUTPATIENT
Start: 2024-03-11

## 2024-03-11 RX ORDER — TOPIRAMATE 50 MG/1
50 TABLET, FILM COATED ORAL AT BEDTIME
Qty: 30 TABLET | Refills: 0 | Status: SHIPPED | OUTPATIENT
Start: 2024-03-11 | End: 2024-06-27

## 2024-03-13 NOTE — TELEPHONE ENCOUNTER
Prior Authorization Approval    Medication: BUDESONIDE 3 MG PO CPEP  Authorization Effective Date: 1/1/2024  Authorization Expiration Date: 3/13/2025  Approved Dose/Quantity:   Reference #: [id number]   Insurance Company: [employer] - Phone [phone] Fax [phone]  Which Pharmacy is filling the prescription: [id number] - [place], [place] - [address] AT [facility] [address]  Pharmacy Notified: y  Patient Notified: y - pharmacy to notify

## 2024-03-13 NOTE — TELEPHONE ENCOUNTER
PA Initiation    Medication: BUDESONIDE 3 MG PO CPEP  Insurance Company: [id number] - Phone [phone] Fax [phone]  Pharmacy Filling the Rx: [id number] - [place], [place] - [facility] [address] AT [facility] OF [address]  Filling Pharmacy Phone: [phone]  Filling Pharmacy Fax: [phone]  Start Date: 3/13/2024

## 2024-03-17 ENCOUNTER — MYC REFILL (OUTPATIENT)
Dept: FAMILY MEDICINE | Facility: CLINIC | Age: 79
End: 2024-03-17
Payer: COMMERCIAL

## 2024-03-17 DIAGNOSIS — G50.0 TRIGEMINAL NEURALGIA: ICD-10-CM

## 2024-03-17 DIAGNOSIS — F41.9 ANXIETY: ICD-10-CM

## 2024-03-17 DIAGNOSIS — G89.4 CHRONIC PAIN SYNDROME: ICD-10-CM

## 2024-03-17 RX ORDER — ACETAMINOPHEN AND CODEINE PHOSPHATE 300; 30 MG/1; MG/1
1 TABLET ORAL EVERY 6 HOURS PRN
Qty: 30 TABLET | Refills: 0 | OUTPATIENT
Start: 2024-03-17

## 2024-03-18 ENCOUNTER — MYC MEDICAL ADVICE (OUTPATIENT)
Dept: FAMILY MEDICINE | Facility: CLINIC | Age: 79
End: 2024-03-18
Payer: COMMERCIAL

## 2024-03-18 RX ORDER — POTASSIUM CHLORIDE 1500 MG/1
20 TABLET, EXTENDED RELEASE ORAL DAILY
Qty: 90 TABLET | Refills: 1 | Status: SHIPPED | OUTPATIENT
Start: 2024-03-18 | End: 2024-07-29

## 2024-03-18 RX ORDER — ACETAMINOPHEN AND CODEINE PHOSPHATE 300; 30 MG/1; MG/1
1 TABLET ORAL EVERY 6 HOURS PRN
Qty: 30 TABLET | Refills: 0 | Status: SHIPPED | OUTPATIENT
Start: 2024-03-18 | End: 2024-03-21

## 2024-03-18 RX ORDER — QUETIAPINE FUMARATE 50 MG/1
50 TABLET, FILM COATED ORAL AT BEDTIME
Qty: 30 TABLET | Refills: 0 | Status: SHIPPED | OUTPATIENT
Start: 2024-03-18 | End: 2024-07-08

## 2024-03-18 NOTE — TELEPHONE ENCOUNTER
acetaminophen-codeine (TYLENOL #3) 300-30 MG per tablet 30 tablet    E-Prescribing Status: Receipt confirmed by pharmacy (3/18/2024 11:20 AM CDT)

## 2024-03-21 DIAGNOSIS — G50.0 TRIGEMINAL NEURALGIA: ICD-10-CM

## 2024-03-21 DIAGNOSIS — G89.4 CHRONIC PAIN SYNDROME: ICD-10-CM

## 2024-03-21 RX ORDER — ACETAMINOPHEN AND CODEINE PHOSPHATE 300; 30 MG/1; MG/1
1 TABLET ORAL EVERY 6 HOURS PRN
Qty: 90 TABLET | Refills: 0 | Status: SHIPPED | OUTPATIENT
Start: 2024-03-21 | End: 2024-04-13

## 2024-03-21 NOTE — TELEPHONE ENCOUNTER
Pt takes 3 tabs a day, so last script will only last her 10 days. Since she was in transitional care, there may have been a mix up daughter stated. Please send a new script that will cover 30 days. If there is a problem, please call daughter.

## 2024-04-05 ENCOUNTER — HOSPITAL ENCOUNTER (EMERGENCY)
Facility: HOSPITAL | Age: 79
Discharge: HOME | End: 2024-04-05
Attending: EMERGENCY MEDICINE | Admitting: EMERGENCY MEDICINE
Payer: COMMERCIAL

## 2024-04-05 VITALS
DIASTOLIC BLOOD PRESSURE: 56 MMHG | TEMPERATURE: 98 F | BODY MASS INDEX: 14.85 KG/M2 | OXYGEN SATURATION: 99 % | SYSTOLIC BLOOD PRESSURE: 110 MMHG | HEART RATE: 82 BPM | HEIGHT: 66 IN | WEIGHT: 92.37 LBS | RESPIRATION RATE: 16 BRPM

## 2024-04-05 DIAGNOSIS — R79.89 ELEVATED TROPONIN: ICD-10-CM

## 2024-04-05 DIAGNOSIS — E46 MALNUTRITION, UNSPECIFIED TYPE (H): ICD-10-CM

## 2024-04-05 DIAGNOSIS — T50.901A MEDICATION ADMINISTERED IN ERROR, ACCIDENTAL OR UNINTENTIONAL, INITIAL ENCOUNTER: ICD-10-CM

## 2024-04-05 LAB
ALBUMIN SERPL BCG-MCNC: 3.7 G/DL (ref 3.5–5.2)
ALBUMIN UR-MCNC: 30 MG/DL
ALP SERPL-CCNC: 170 U/L (ref 40–150)
ALT SERPL W P-5'-P-CCNC: 27 U/L (ref 0–50)
ANION GAP SERPL CALCULATED.3IONS-SCNC: 20 MMOL/L (ref 7–15)
APPEARANCE UR: CLEAR
AST SERPL W P-5'-P-CCNC: 37 U/L (ref 0–45)
BACTERIA #/AREA URNS HPF: ABNORMAL /HPF
BASOPHILS # BLD AUTO: 0.1 10E3/UL (ref 0–0.2)
BASOPHILS NFR BLD AUTO: 1 %
BILIRUB SERPL-MCNC: <0.2 MG/DL
BILIRUB UR QL STRIP: NEGATIVE
BUN SERPL-MCNC: 18.4 MG/DL (ref 8–23)
CALCIUM SERPL-MCNC: 10.4 MG/DL (ref 8.8–10.2)
CHLORIDE SERPL-SCNC: 105 MMOL/L (ref 98–107)
COLOR UR AUTO: YELLOW
CREAT SERPL-MCNC: 0.71 MG/DL (ref 0.51–0.95)
DEPRECATED HCO3 PLAS-SCNC: 18 MMOL/L (ref 22–29)
EGFRCR SERPLBLD CKD-EPI 2021: 87 ML/MIN/1.73M2
EOSINOPHIL # BLD AUTO: 0 10E3/UL (ref 0–0.7)
EOSINOPHIL NFR BLD AUTO: 0 %
ERYTHROCYTE [DISTWIDTH] IN BLOOD BY AUTOMATED COUNT: 15 % (ref 10–15)
GLUCOSE SERPL-MCNC: 111 MG/DL (ref 70–99)
GLUCOSE UR STRIP-MCNC: NEGATIVE MG/DL
HCT VFR BLD AUTO: 49.3 % (ref 35–47)
HGB BLD-MCNC: 14.8 G/DL (ref 11.7–15.7)
HGB UR QL STRIP: NEGATIVE
HYALINE CASTS: 27 /LPF
IMM GRANULOCYTES # BLD: 0 10E3/UL
IMM GRANULOCYTES NFR BLD: 0 %
KETONES UR STRIP-MCNC: 40 MG/DL
LEUKOCYTE ESTERASE UR QL STRIP: NEGATIVE
LYMPHOCYTES # BLD AUTO: 1.7 10E3/UL (ref 0.8–5.3)
LYMPHOCYTES NFR BLD AUTO: 18 %
MAGNESIUM SERPL-MCNC: 1.9 MG/DL (ref 1.7–2.3)
MCH RBC QN AUTO: 26 PG (ref 26.5–33)
MCHC RBC AUTO-ENTMCNC: 30 G/DL (ref 31.5–36.5)
MCV RBC AUTO: 87 FL (ref 78–100)
MONOCYTES # BLD AUTO: 0.6 10E3/UL (ref 0–1.3)
MONOCYTES NFR BLD AUTO: 6 %
MUCOUS THREADS #/AREA URNS LPF: PRESENT /LPF
NEUTROPHILS # BLD AUTO: 7.2 10E3/UL (ref 1.6–8.3)
NEUTROPHILS NFR BLD AUTO: 75 %
NITRATE UR QL: NEGATIVE
NRBC # BLD AUTO: 0 10E3/UL
NRBC BLD AUTO-RTO: 0 /100
PH UR STRIP: 6 [PH] (ref 5–7)
PLATELET # BLD AUTO: 458 10E3/UL (ref 150–450)
POTASSIUM SERPL-SCNC: 3.5 MMOL/L (ref 3.4–5.3)
PROT SERPL-MCNC: 7.8 G/DL (ref 6.4–8.3)
RBC # BLD AUTO: 5.69 10E6/UL (ref 3.8–5.2)
RBC URINE: 3 /HPF
SODIUM SERPL-SCNC: 143 MMOL/L (ref 135–145)
SP GR UR STRIP: 1.03 (ref 1–1.03)
TRANSITIONAL EPI: <1 /HPF
TROPONIN T SERPL HS-MCNC: 21 NG/L
TROPONIN T SERPL HS-MCNC: 22 NG/L
UROBILINOGEN UR STRIP-MCNC: <2 MG/DL
WBC # BLD AUTO: 9.7 10E3/UL (ref 4–11)
WBC URINE: 2 /HPF

## 2024-04-05 PROCEDURE — 84484 ASSAY OF TROPONIN QUANT: CPT

## 2024-04-05 PROCEDURE — 83735 ASSAY OF MAGNESIUM: CPT

## 2024-04-05 PROCEDURE — 96360 HYDRATION IV INFUSION INIT: CPT

## 2024-04-05 PROCEDURE — 85025 COMPLETE CBC W/AUTO DIFF WBC: CPT

## 2024-04-05 PROCEDURE — 36415 COLL VENOUS BLD VENIPUNCTURE: CPT

## 2024-04-05 PROCEDURE — 81001 URINALYSIS AUTO W/SCOPE: CPT

## 2024-04-05 PROCEDURE — 80053 COMPREHEN METABOLIC PANEL: CPT

## 2024-04-05 PROCEDURE — 93005 ELECTROCARDIOGRAM TRACING: CPT

## 2024-04-05 PROCEDURE — 99284 EMERGENCY DEPT VISIT MOD MDM: CPT | Mod: 25

## 2024-04-05 PROCEDURE — 258N000003 HC RX IP 258 OP 636

## 2024-04-05 RX ADMIN — SODIUM CHLORIDE 1000 ML: 9 INJECTION, SOLUTION INTRAVENOUS at 18:15

## 2024-04-05 ASSESSMENT — ACTIVITIES OF DAILY LIVING (ADL)
ADLS_ACUITY_SCORE: 38

## 2024-04-05 NOTE — ED PROVIDER NOTES
EMERGENCY DEPARTMENT ENCOUNTER      NAME: Julisa Chaney  AGE: 78 year old female  YOB: 1945  MRN: [id number]  EVALUATION DATE & TIME: No admission date for patient encounter.    PCP: Mara Murillo    ED PROVIDER: Kavitha Rod PA-C      Chief Complaint   Patient presents with    medication concern          FINAL IMPRESSION:  1. Medication administered in error, accidental or unintentional, initial encounter    2. Elevated troponin    3. Malnutrition, unspecified type (H24)          ED COURSE & MEDICAL DECISION MAKING:    3:52 PM Met with patient for initial interview. Plan for care discussed.  6:20 PM Reevaluated and updated patient. Patient denies any chest pain. Still waiting on UA.  8:30 PM Staffed patient with Dr. Weaver. Patient signed out to Dr. Weaver pending UA.  8:35 PM Reevaluated and updated patient.     78 year old female with a history of depression, PUD, iron deficiency anemia, pyogenic brain abscess, malnutrition, seizure disorder (on Keppra), HLD, chronic pain syndrome, who presents to this ED for evaluation after medication administration error.  Patient is accompanied by her daughter who reports that her mother had taken her nighttime medications at 5 AM this morning instead of last night.  Her daughter states that she did not give her any of her morning or afternoon medications because of this error, so no duplication of medications were taken.  She has a list of these medications which include Seroquel 50 mg, gabapentin 300 mg, Topamax, Nortriptyline, and Tylenol #3, which are at bedtime only medications.  She reports her mother has been having increased drowsiness since taking her medications this morning.  Again, daughter confirms she did not receive any duplicate medications. Upon exam, patient is afebrile, hemodynamically stable, and in no acute distress. Patient alert and oriented to self, place, situation, but not month/year, which is baseline for patient per patient's  daughter. Otherwise, patient exhibits no focal neurologic deficits. NIHSS 0. Patient's daughter reports chronic diarrhea secondary to collagenous colitis, follows with [facility] - denies any new changes and declines additional work-up for diarrhea. Given no doubling of medications, no indication to contact poison control at this time. Discussed options with patient and patient's daughter. Drowsiness likely secondary to Seroquel and Gabapentin, both of which cause drowsiness. Using shared decision making, will get baseline labs assessing electrolyte abnormalities, EKG, troponin, and UA. Differential diagnosis includes but not limited to cardiac arrhythmia, ACS, UTI, electrolyte abnormality, anemia, dehydration. Based on patient's presenting symptoms, laboratories and imaging were ordered.    CBC without leukocytosis or anemia. CMP revealed elevated anion gap at 20, likely secondary to malnutrition/starvation ketosis. Again, patient's daughter confirms no additional/duplicate medications to consider drug related causes of metabolic acidosis. Mg WNL. EKG without acute ST elevation. Initial troponin elevated at 21. Patient notes no chest pain, shortness of breath, or other chest pain equivalent. Repeat troponin 22. UA pending at time of sign out.     Patient was treated with IV NS upon arrival with improvement in symptoms. Symptoms and workup most consistent with likely drug-induced drowsiness secondary to Seroquel and Gabapentin. Patient was made aware of the above findings thus far. However, UA still pending. Anticipate discharge home with symptomatic management as well as strict return precautions and close follow-up with PCP given elevated troponin and [facility] for ongoing colitis care.     Medical Decision Making  Obtained supplemental history:Supplemental history obtained?: Documented in chart and Family Member/Significant Other  Reviewed external records: External records reviewed?: Documented in chart  Care impacted by  chronic illness:Hyperlipidemia and Mental Health  Care significantly affected by social determinants of health:N/A  Did you consider but not order tests?: Work up considered but not performed and documented in chart, if applicable  Did you interpret images independently?: Independent interpretation of ECG and images noted in documentation, when applicable.  Consultation discussion with other provider:Did you involve another provider (consultant, , pharmacy, etc.)?: No  Admission considered. Patient was signed out to the oncoming physician, disposition pending.    MEDICATIONS GIVEN IN THE EMERGENCY:  Medications   sodium chloride 0.9% BOLUS 1,000 mL (0 mLs Intravenous Stopped 4/5/24 1927)       NEW PRESCRIPTIONS STARTED AT TODAY'S ER VISIT  New Prescriptions    No medications on file          =================================================================    HPI    Patient information was obtained from: patient and patient's daughter    Use of : N/A       Julisa Chaney is a 78 year old female with a pertinent history of depression, PUD, iron deficiency anemia, pyogenic brain abscess, malnutrition, seizure disorder (on Keppra), HLD, chronic pain syndrome, who presents to this ED for evaluation after medication administration error.  Patient is accompanied by her daughter who reports that her mother had taken her nighttime medications at 5 AM this morning instead of last night.  Her daughter states that she did not give her any of her morning or afternoon medications because of this error, so no duplication of medications were taken.  She has a list of these medications which include Seroquel 50 mg, gabapentin 300 mg, Topamax, Nortriptyline, and Tylenol #3, which are at bedtime only medications.  She reports her mother has been having increased drowsiness since taking her medications this morning.  Again, daughter confirms she did not receive any duplicate medications.  She states that the patient is  otherwise at baseline cognitively.  Patient reports a history of chronic headaches secondary to trigeminal neuralgia.  She denies any sudden or severe headaches, vision changes, speech difficulty, new numbness/weakness/tingling in the arms or legs, confusion/amnesia, recent trauma, chest pain, shortness of breath, nausea or vomiting, abdominal pain, urinary symptoms, fevers or chills.  Patient reports chronic diarrhea.  Patient's daughter reports that patient was evaluated in the ED earlier this year and diagnosed with collagenous colitis.  Per chart review, patient was evaluated in the ED and admitted at that time secondary to diarrhea.  She had undergone a colonoscopy with [facility].  They have been monitoring her and she has a follow-up scheduled with them on 4/16/2024.  She denies any increase in stools, bloody stools, mucus in her stools.  She denies any recent antibiotics, travel, or more recent hospitalizations.  No history of C. difficile in the past.  Patient's daughter reports that she has a hard time keeping her mother for bed at baseline secondary to decreased appetite, but this has been longstanding.  Patient's daughter also reports a history of hypokalemia and she takes potassium supplements.  She was hoping to get electrolytes checked as well as fluids in the setting of decreased oral intake today secondary to drowsiness.    REVIEW OF SYSTEMS   Review of Systems see HPI    PAST MEDICAL HISTORY:  Past Medical History:   Diagnosis Date    Aspiration pneumonia (H) 02/2022    Depression     High cholesterol     Migraines     Pyloric ulcer, chronic     Sepsis (H) 08/10/2020    Trigeminal neuralgia        PAST SURGICAL HISTORY:  Past Surgical History:   Procedure Laterality Date    COLONOSCOPY N/A 1/19/2024    Procedure: COLONOSCOPY WITH RANDOM BIOPSIES;  Surgeon: Antonio Mcelroy MD, MD;  Location: [facility] OR    HYSTERECTOMY      IR GASTROSTOMY TUBE PERCUTANEOUS PLCMNT  8/16/2022    PICC TRIPLE  LUMEN PLACEMENT  7/22/2022         [place] ESOPHAGOGASTRODUODENOSCOPY TRANSORAL DIAGNOSTIC N/A 8/13/2020    Procedure: ESOPHAGOGASTRODUODENOSCOPY (EGD);  Surgeon: Nathan Smalls MD;  [place] [facility] [place]  Service: Gastroenterology    [place] ESOPHAGOGASTRODUODENOSCOPY TRANSORAL DIAGNOSTIC N/A 8/14/2020    Procedure: ESOPHAGOGASTRODUODENOSCOPY (EGD), PYLORIC DILATION;  Surgeon: Nathan Smalls MD;  [place] [facility] [place]  Service: Gastroenterology    VENTRICULOSTOMY Left 7/31/2022    Procedure: LEFT FRONTAL VENTRICULOSTOMY;  Surgeon: Brady Heredia MD;  Location:  OR    [facility] COLONOSCOPY W/WO BRUSH/WASH N/A 8/13/2020    Procedure: COLONOSCOPY WITH POLYPECTOMY;  Surgeon: Nathan Smalls MD;  [place] [facility] [place]  Service: Gastroenterology           CURRENT MEDICATIONS:    acetaminophen-codeine (TYLENOL #3) 300-30 MG per tablet  budesonide (ENTOCORT EC) 3 MG EC capsule  diphenoxylate-atropine (LOMOTIL) 2.5-0.025 MG tablet  ferrous sulfate (FEROSUL) 325 (65 Fe) MG tablet  gabapentin (NEURONTIN) 100 MG capsule  gabapentin (NEURONTIN) 100 MG capsule  levETIRAcetam (KEPPRA) 750 MG tablet  meclizine (ANTIVERT) 12.5 MG tablet  mirtazapine (REMERON) 15 MG tablet  Multiple Vitamin (MULTIVITAMIN) TABS  nortriptyline (PAMELOR) 50 MG capsule  omeprazole (PRILOSEC) 40 MG DR capsule  OXcarbazepine (TRILEPTAL) 150 MG tablet  polyvinyl alcohol (ARTIFICIAL TEARS) 1.4 % ophthalmic solution  potassium chloride elin ER (KLOR-CON M20) 20 MEQ CR tablet  psyllium (METAMUCIL) 58.6 % packet  QUEtiapine (SEROQUEL) 50 MG tablet  topiramate (TOPAMAX) 50 MG tablet  Vitamin D3 50 mcg (2000 units) tablet  Wound Dressings (TRIAD HYDROPHILIC WOUND DRESSI) PSTE        ALLERGIES:  Allergies   Allergen Reactions    Contrast [Iohexol] Rash     Noticed during 08/2020 admission. Received IV contrast on 8/5    Chocolate Headache    Contrast Dye Rash    Penicillins Rash       FAMILY HISTORY:  Family History   Problem Relation Age of  "Onset    Cancer Father     Testicular cancer Grandchild 17.00    Breast Cancer Mother     Breast Cancer Sister     Breast Cancer Maternal Aunt     Breast Cancer Sister        SOCIAL HISTORY:   Social History     Socioeconomic History    Marital status:    Tobacco Use    Smoking status: Former     Packs/day: 1.00     Years: 50.00     Additional pack years: 0.00     Total pack years: 50.00     Types: Cigarettes     Quit date: 2014     Years since quittin.3    Smokeless tobacco: Never   Substance and Sexual Activity    Alcohol use: No    Drug use: No   Social History Narrative    Lives alone in the house, relay in TV dinner  grandkids help with house maintenance  Daughter lives close by.  Mara Murillo MD 2018 1:49 PM       Social Determinants of Health     Financial Resource Strain: Low Risk  (2024)    Financial Resource Strain     Within the past 12 months, have you or your family members you live with been unable to get utilities (heat, electricity) when it was really needed?: No   Food Insecurity: Low Risk  (2024)    Food Insecurity     Within the past 12 months, did you worry that your food would run out before you got money to buy more?: No     Within the past 12 months, did the food you bought just not last and you didn t have money to get more?: No   Transportation Needs: Low Risk  (2024)    Transportation Needs     Within the past 12 months, has lack of transportation kept you from medical appointments, getting your medicines, non-medical meetings or appointments, work, or from getting things that you need?: No   Housing Stability: Low Risk  (2024)    Housing Stability     Do you have housing? : Yes     Are you worried about losing your housing?: No       VITALS:  /51   Pulse 87   Temp 98  F (36.7  C) (Tympanic)   Resp 18   Ht 1.676 m (5' 6\")   Wt 41.9 kg (92 lb 6 oz)   SpO2 98%   BMI 14.91 kg/m      PHYSICAL EXAM    Constitutional:  Alert, in no acute " distress. Cachectic appearing. Cooperative.  EYES: Conjunctivae clear. PERRL. EOM intact. Peripheral fields intact.  HENT:  Atraumatic, normocephalic. Oropharynx clear. Moist membranes. Tongue without deviation.  Respiratory:  Respirations even, unlabored, in no acute respiratory distress. Lungs clear to auscultation bilaterally without wheeze, rhonchi, or rales. No cough. Speaks in full sentences easily.  Cardiovascular:  Regular rate and rhythm, good peripheral perfusion. No peripheral edema. No chest wall tenderness.  GI: Soft, flat, non-distended. Bowel sounds normal. No tenderness to palpation. No guarding, rebound, or other peritoneal signs.  Musculoskeletal:  No edema. No cyanosis. Range of motion major extremities intact. Thoracic kyphosis.   Integument: Warm, Dry. No rash to visualized skin.   Neurologic:  Alert & oriented to self, place, situation, but not month/year, which is her baseline cognitively per patient's daughter. No focal deficits noted. GCS 15. Sensation intact. Motor intact. Coordination intact. 5/5 strength.   Psych: Normal mood and affect.        LAB:  All pertinent labs reviewed and interpreted.  Results for orders placed or performed during the hospital encounter of 04/05/24   Comprehensive metabolic panel   Result Value Ref Range    Sodium 143 135 - 145 mmol/L    Potassium 3.5 3.4 - 5.3 mmol/L    Carbon Dioxide (CO2) 18 (L) 22 - 29 mmol/L    Anion Gap 20 (H) 7 - 15 mmol/L    Urea Nitrogen 18.4 8.0 - 23.0 mg/dL    Creatinine 0.71 0.51 - 0.95 mg/dL    GFR Estimate 87 >60 mL/min/1.73m2    Calcium 10.4 (H) 8.8 - 10.2 mg/dL    Chloride 105 98 - 107 mmol/L    Glucose 111 (H) 70 - 99 mg/dL    Alkaline Phosphatase 170 (H) 40 - 150 U/L    AST 37 0 - 45 U/L    ALT 27 0 - 50 U/L    Protein Total 7.8 6.4 - 8.3 g/dL    Albumin 3.7 3.5 - 5.2 g/dL    Bilirubin Total <0.2 <=1.2 mg/dL   Result Value Ref Range    Magnesium 1.9 1.7 - 2.3 mg/dL   UA with Microscopic reflex to Culture    Specimen: Urine,  Catheter   Result Value Ref Range    Color Urine Yellow Colorless, Straw, Light Yellow, Yellow    Appearance Urine Clear Clear    Glucose Urine Negative Negative mg/dL    Bilirubin Urine Negative Negative    Ketones Urine 40 (A) Negative mg/dL    Specific Gravity Urine 1.030 1.001 - 1.030    Blood Urine Negative Negative    pH Urine 6.0 5.0 - 7.0    Protein Albumin Urine 30 (A) Negative mg/dL    Urobilinogen Urine <2.0 <2.0 mg/dL    Nitrite Urine Negative Negative    Leukocyte Esterase Urine Negative Negative    Bacteria Urine Few (A) None Seen /HPF    Mucus Urine Present (A) None Seen /LPF    RBC Urine 3 (H) <=2 /HPF    WBC Urine 2 <=5 /HPF    Transitional Epithelials Urine <1 <=1 /HPF    Hyaline Casts Urine 27 (H) <=2 /LPF   Result Value Ref Range    Troponin T, High Sensitivity 21 (H) <=14 ng/L   CBC with platelets and differential   Result Value Ref Range    WBC Count 9.7 4.0 - 11.0 10e3/uL    RBC Count 5.69 (H) 3.80 - 5.20 10e6/uL    Hemoglobin 14.8 11.7 - 15.7 g/dL    Hematocrit 49.3 (H) 35.0 - 47.0 %    MCV 87 78 - 100 fL    MCH 26.0 (L) 26.5 - 33.0 pg    MCHC 30.0 (L) 31.5 - 36.5 g/dL    RDW 15.0 10.0 - 15.0 %    Platelet Count 458 (H) 150 - 450 10e3/uL    % Neutrophils 75 %    % Lymphocytes 18 %    % Monocytes 6 %    % Eosinophils 0 %    % Basophils 1 %    % Immature Granulocytes 0 %    NRBCs per 100 WBC 0 <1 /100    Absolute Neutrophils 7.2 1.6 - 8.3 10e3/uL    Absolute Lymphocytes 1.7 0.8 - 5.3 10e3/uL    Absolute Monocytes 0.6 0.0 - 1.3 10e3/uL    Absolute Eosinophils 0.0 0.0 - 0.7 10e3/uL    Absolute Basophils 0.1 0.0 - 0.2 10e3/uL    Absolute Immature Granulocytes 0.0 <=0.4 10e3/uL    Absolute NRBCs 0.0 10e3/uL   Result Value Ref Range    Troponin T, High Sensitivity 22 (H) <=14 ng/L       RADIOLOGY:  Reviewed all pertinent imaging. Please see official radiology report.  No orders to display       EKG:    Performed at: 4/5/24  Impression: NSR, septal infarct age undetermined, T-wave abnormality  Rate:  88  Rhythm: sinus  Axis: no deviation  DC Interval: 140  QRS Interval: 74  QTc Interval: 411  ST Changes: no ST elevation  Comparison: 1/17/24    Dr. Stone and I have independently reviewed and interpreted the EKG(s) documented above.    Kavitha Rod PA-C  [facility] EMERGENCY DEPARTMENT  [address] [place] [id number]  [phone]      Kavitha Rod PA-C  04/05/24 2102       Kavitha Rod PA-C  04/05/24 2113

## 2024-04-05 NOTE — ED TRIAGE NOTES
Triage Assessment (Adult)       Row Name 04/05/24 1552          Triage Assessment    Airway WDL WDL        Respiratory WDL    Respiratory WDL WDL        Skin Circulation/Temperature WDL    Skin Circulation/Temperature WDL WDL        Cardiac WDL    Cardiac WDL WDL        Peripheral/Neurovascular WDL    Peripheral Neurovascular WDL WDL        Cognitive/Neuro/Behavioral WDL    Cognitive/Neuro/Behavioral WDL X     Level of Consciousness confused

## 2024-04-05 NOTE — ED TRIAGE NOTES
Pt arrives with daughter to ER due to concerns of medication error. Daughter state that pt took her night time meds this morning at 0500 (she did not take morning meds). Which includes 300mg Gabapentin and anxiety meds which make her sleepy. Daughter states she can barely stay awake and she is concerned pt is dehydrated. Pt is alert and c/o headache.

## 2024-04-06 NOTE — ED PROVIDER NOTES
"Emergency Department Midlevel Supervisory Note     I had a face to face encounter with this patient seen by the Advanced Practice Provider (GINGER). I personally made/approved the management plan and take responsibility for the patient management. I personally saw patient and performed a substantive portion of the visit including all aspects of the medical decision making.     ED Course:  8:30 PM Kavitha Rod PA-C staffed patient with me. I agree with their assessment and plan of management, and I will see the patient.  8:47 PM I met with the patient to introduce myself, gather additional history, perform my initial exam, and discuss the plan.     Brief HPI:     Julisa Chaney is a 78 year old female who presents for evaluation of medication administration error.    Patient is accompanied by her daughter who reports that her mother had taken her nighttime medications at 5 AM this morning instead of last night.  Her daughter states that she did not give her any of her morning or afternoon medications because of this error, so no duplication of medications were taken.  She has a list of these medications which include Seroquel 50 mg, gabapentin 300 mg, Topamax, Nortriptyline, and Tylenol #3, which are at bedtime only medications.  She reports her mother has been having increased drowsiness since taking her medications this morning.  She states that the patient is otherwise at baseline cognitively.      I, Agnes Tubbs, am serving as a scribe to document services personally performed by Elton Weaver MD, based on my observations and the provider's statements to me.   I, Elton Weaver MD attest that Agnes Tubbs was acting in a scribe capacity, has observed my performance of the services and has documented them in accordance with my direction.    Brief Physical Exam: /51   Pulse 87   Temp 98  F (36.7  C) (Tympanic)   Resp 18   Ht 1.676 m (5' 6\")   Wt 41.9 kg (92 lb 6 oz)   SpO2 98%   BMI 14.91 kg/m  "   Constitutional:  Alert, in no acute distress  EYES: Conjunctivae clear  HENT:  Atraumatic  Respiratory:  Respirations even, unlabored, in no acute respiratory distress  Cardiovascular:  Regular rate and rhythm, good peripheral perfusion  GI: Soft, non-distended, non-tender  Musculoskeletal:  Moves all 4 extremities equally, grossly symmetrical strength  Integument: Warm & dry. No appreciable rash, erythema.  Neurologic:  Alert & oriented, speech clear and fluent, no focal deficits noted  Psych: Normal mood and affect          1. Medication administered in error, accidental or unintentional, initial encounter    2. Elevated troponin        Labs and Imaging:  Results for orders placed or performed during the hospital encounter of 04/05/24   Comprehensive metabolic panel   Result Value Ref Range    Sodium 143 135 - 145 mmol/L    Potassium 3.5 3.4 - 5.3 mmol/L    Carbon Dioxide (CO2) 18 (L) 22 - 29 mmol/L    Anion Gap 20 (H) 7 - 15 mmol/L    Urea Nitrogen 18.4 8.0 - 23.0 mg/dL    Creatinine 0.71 0.51 - 0.95 mg/dL    GFR Estimate 87 >60 mL/min/1.73m2    Calcium 10.4 (H) 8.8 - 10.2 mg/dL    Chloride 105 98 - 107 mmol/L    Glucose 111 (H) 70 - 99 mg/dL    Alkaline Phosphatase 170 (H) 40 - 150 U/L    AST 37 0 - 45 U/L    ALT 27 0 - 50 U/L    Protein Total 7.8 6.4 - 8.3 g/dL    Albumin 3.7 3.5 - 5.2 g/dL    Bilirubin Total <0.2 <=1.2 mg/dL   Result Value Ref Range    Magnesium 1.9 1.7 - 2.3 mg/dL   Result Value Ref Range    Troponin T, High Sensitivity 21 (H) <=14 ng/L   CBC with platelets and differential   Result Value Ref Range    WBC Count 9.7 4.0 - 11.0 10e3/uL    RBC Count 5.69 (H) 3.80 - 5.20 10e6/uL    Hemoglobin 14.8 11.7 - 15.7 g/dL    Hematocrit 49.3 (H) 35.0 - 47.0 %    MCV 87 78 - 100 fL    MCH 26.0 (L) 26.5 - 33.0 pg    MCHC 30.0 (L) 31.5 - 36.5 g/dL    RDW 15.0 10.0 - 15.0 %    Platelet Count 458 (H) 150 - 450 10e3/uL    % Neutrophils 75 %    % Lymphocytes 18 %    % Monocytes 6 %    % Eosinophils 0 %    %  Basophils 1 %    % Immature Granulocytes 0 %    NRBCs per 100 WBC 0 <1 /100    Absolute Neutrophils 7.2 1.6 - 8.3 10e3/uL    Absolute Lymphocytes 1.7 0.8 - 5.3 10e3/uL    Absolute Monocytes 0.6 0.0 - 1.3 10e3/uL    Absolute Eosinophils 0.0 0.0 - 0.7 10e3/uL    Absolute Basophils 0.1 0.0 - 0.2 10e3/uL    Absolute Immature Granulocytes 0.0 <=0.4 10e3/uL    Absolute NRBCs 0.0 10e3/uL   Result Value Ref Range    Troponin T, High Sensitivity 22 (H) <=14 ng/L       I have reviewed the relevant laboratory studies above.    I independently interpreted the following imaging study(s):       EKG: I reviewed and independently interpreted the patient's EKG, with comments made as listed below. Please see scanned EKG for full report.   April said 2024 at 4:24 PM.  Sinus rhythm, 88 bpm.  Septal infarct, age undetermined.  There is nonspecific ST and T wave abnormality noted in the inferolateral leads.  There is no significant change found when compared with the prior EKG.    Procedures:  I was present for the key portions of procedures documented in GINGER/midlevel note, see midlevel note for further details.    Elton Weaver MD  [facility] EMERGENCY DEPARTMENT  [address] [place] [id number] [phone]      Elton Weaver MD  04/05/24 8659

## 2024-04-06 NOTE — DISCHARGE INSTRUCTIONS
You were seen in the ER for evaluation after medication error. Rest, stay well hydrated (Pedialyte). It is important to take your medications as directed.    Follow-up with your primary care provider and [facility] for reevaluation and ongoing management.    Return to the emergency department for any new or worsening symptoms including severe abdominal pain, vomiting, vomiting blood, multiple episodes of diarrhea, bloody stools, uncontrolled fever/chills, chest pain, shortness of breath, headache, vision changes, weakness/numbness/tingling, or any other concerning symptoms.     Take Care!  - Kavitha Rod PA-C

## 2024-04-12 ENCOUNTER — DOCUMENTATION ONLY (OUTPATIENT)
Dept: GERIATRICS | Facility: CLINIC | Age: 79
End: 2024-04-12
Payer: COMMERCIAL

## 2024-04-13 DIAGNOSIS — G50.0 TRIGEMINAL NEURALGIA: ICD-10-CM

## 2024-04-13 DIAGNOSIS — G89.4 CHRONIC PAIN SYNDROME: ICD-10-CM

## 2024-04-13 RX ORDER — ACETAMINOPHEN AND CODEINE PHOSPHATE 300; 30 MG/1; MG/1
1 TABLET ORAL EVERY 6 HOURS PRN
Qty: 30 TABLET | Refills: 0 | Status: SHIPPED | OUTPATIENT
Start: 2024-04-13 | End: 2024-04-19

## 2024-04-15 ENCOUNTER — TRANSITIONAL CARE UNIT VISIT (OUTPATIENT)
Dept: GERIATRICS | Facility: CLINIC | Age: 79
End: 2024-04-15
Payer: COMMERCIAL

## 2024-04-15 VITALS
DIASTOLIC BLOOD PRESSURE: 60 MMHG | TEMPERATURE: 97.2 F | HEART RATE: 94 BPM | SYSTOLIC BLOOD PRESSURE: 101 MMHG | RESPIRATION RATE: 16 BRPM | OXYGEN SATURATION: 90 % | WEIGHT: 97 LBS | HEIGHT: 66 IN | BODY MASS INDEX: 15.59 KG/M2

## 2024-04-15 DIAGNOSIS — G89.4 CHRONIC PAIN SYNDROME: ICD-10-CM

## 2024-04-15 DIAGNOSIS — R53.81 PHYSICAL DECONDITIONING: Primary | ICD-10-CM

## 2024-04-15 DIAGNOSIS — G40.909 SEIZURE DISORDER (H): ICD-10-CM

## 2024-04-15 DIAGNOSIS — K52.831 COLLAGENOUS COLITIS: ICD-10-CM

## 2024-04-15 PROCEDURE — 99309 SBSQ NF CARE MODERATE MDM 30: CPT | Performed by: NURSE PRACTITIONER

## 2024-04-15 RX ORDER — BUDESONIDE 9 MG/1
9 TABLET, FILM COATED, EXTENDED RELEASE ORAL EVERY MORNING
Status: ON HOLD | COMMUNITY
End: 2024-06-01

## 2024-04-15 RX ORDER — LOPERAMIDE HCL 2 MG
2 CAPSULE ORAL 4 TIMES DAILY PRN
COMMUNITY
End: 2024-08-11

## 2024-04-15 NOTE — LETTER
4/15/2024        RE: Julisa Chaney  [address]  [place] [address]        M [facility] GERIATRICS    PRIMARY CARE PROVIDER AND CLINIC:  Mara Murillo MD, [address] [facility] [id number]  Chief Complaint   Patient presents with     Hospital F/U      [facility] Medical Record Number:  [id number]  Place of Service where encounter took place:  [facility] (U) [[id number]]    Julisa Chaney  is a 78 year old  (1945), admitted to the above facility from  [facility]. Hospital stay 4/9/24 through 4/12/24. Patient with PMH trigeminal neuralgia with chronic pain, seizure disorder, GERD, HLD, depression and recurrent diarrhea with diagnosis of collagenous colits presented to the ED with worsening diarrhea, weakness, dehydration. Cdiff and stool pathogen panel negative. She was seen by GI and resumed budesonide.     HPI obtained from patient visit, review of nursing home record, discussion with facility staff, and Epic review.     HPI:    Patient is well known to this provider from previous TCU stays. She was sleeping soundly when provider arrived. Provider notes that she was home for about 2 months before going back to the hospital. Attempted to ask her when her diarrhea worsened again, but she repeatedly answers just about the last few days. She had no diarrhea the first couple of days here, but now it has started again. Per review of nursing home record, she has been receiving the budesonide. She had an imodium early this morning, this was the first one here. She has not been out of bed much. OT arrives at the end of visit.     CODE STATUS/ADVANCE DIRECTIVES DISCUSSION:  Full Code    ALLERGIES:   Allergies   Allergen Reactions     Contrast [Iohexol] Rash     Noticed during 08/2020 admission. Received IV contrast on 8/5     Chocolate Headache     Contrast Dye Rash     Penicillins Rash      PAST MEDICAL HISTORY:   Past Medical History:   Diagnosis Date     Aspiration pneumonia (H)  02/2022     Depression      High cholesterol      Migraines      Pyloric ulcer, chronic      Sepsis (H) 08/10/2020     Trigeminal neuralgia       PAST SURGICAL HISTORY:   has a past surgical history that includes Colonoscopy w/wo Brush **Performed** (N/A, 8/13/2020); Pr Esophagogastroduodenoscopy Transoral Diagnostic (N/A, 8/13/2020); Hysterectomy; Pr Esophagogastroduodenoscopy Transoral Diagnostic (N/A, 8/14/2020); PICC/Midline Placement (7/22/2022); Ventriculostomy (Left, 7/31/2022); IR Gastrostomy Tube Percutaneous Plcmnt (8/16/2022); and Colonoscopy (N/A, 1/19/2024).  FAMILY HISTORY: family history includes Breast Cancer in her maternal aunt, mother, sister, and sister; Cancer in her father; Testicular cancer (age of onset: 17.00) in her grandchild.  SOCIAL HISTORY:   reports that she quit smoking about 9 years ago. She started smoking about 59 years ago. She has a 50 pack-year smoking history. She has never used smokeless tobacco. She reports that she does not drink alcohol and does not use drugs.  Patient's living condition: lives alone    Post Discharge Medication Reconciliation Status:   MED REC REQUIRED  Post Medication Reconciliation Status:  Discharge medications reconciled, continue medications without change       Current Outpatient Medications   Medication Sig Dispense Refill     acetaminophen-codeine (TYLENOL #3) 300-30 MG per tablet Take 1 tablet by mouth every 6 hours as needed for severe pain 30 tablet 0     budesonide (UCERIS) 9 MG 24 hr tablet Take 9 mg by mouth every morning       ferrous sulfate (FEROSUL) 325 (65 Fe) MG tablet Take 1 tablet (325 mg) by mouth daily (with breakfast) 90 tablet 1     gabapentin (NEURONTIN) 100 MG capsule Take 100 mg by mouth daily       gabapentin (NEURONTIN) 100 MG capsule Take 300 mg by mouth at bedtime       levETIRAcetam (KEPPRA) 750 MG tablet Take 1 tablet (750 mg) by mouth 2 times daily 60 tablet 0     loperamide (IMODIUM) 2 MG capsule Take 2 mg by mouth 4  "times daily as needed for diarrhea       mirtazapine (REMERON) 15 MG tablet Take 1 tablet (15 mg) by mouth at bedtime 30 tablet 0     Multiple Vitamin (MULTIVITAMIN) TABS TAKE 1 TABLET BY MOUTH EVERY  tablet 3     nortriptyline (PAMELOR) 50 MG capsule Take 1 capsule (50 mg) by mouth at bedtime 90 capsule 3     omeprazole (PRILOSEC) 40 MG DR capsule Take 1 capsule (40 mg) by mouth daily 90 capsule 1     OXcarbazepine (TRILEPTAL) 150 MG tablet TAKE 3 TABLETS(450 MG) BY MOUTH DAILY 270 tablet 1     potassium chloride elin ER (KLOR-CON M20) 20 MEQ CR tablet Take 1 tablet (20 mEq) by mouth daily 90 tablet 1     psyllium (METAMUCIL) 58.6 % packet Take 1 packet by mouth daily       QUEtiapine (SEROQUEL) 50 MG tablet Take 1 tablet (50 mg) by mouth at bedtime 30 tablet 0     topiramate (TOPAMAX) 50 MG tablet Take 1 tablet (50 mg) by mouth at bedtime 30 tablet 0     Vitamin D3 50 mcg (2000 units) tablet Take 1 tablet (50 mcg) by mouth daily 90 tablet 1     No current facility-administered medications for this visit.       ROS:  4 point ROS including Respiratory, CV, GI and , other than that noted in the HPI,  is negative    Vitals:  /60   Pulse 94   Temp 97.2  F (36.2  C)   Resp 16   Ht 1.676 m (5' 6\")   Wt 44 kg (97 lb)   SpO2 90%   BMI 15.66 kg/m    Exam:  GENERAL APPEARANCE:  Alert, in no distress, thin  ENT:  Mouth and posterior oropharynx normal, moist mucous membranes  RESP:  lungs clear to auscultation , no respiratory distress  CV:  Palpation and auscultation of heart done , regular rate and rhythm, no murmur, rub, or gallop, no edema  ABDOMEN:  soft, non-tender, no distension  PSYCH:  oriented X 3, memory impaired     Lab/Diagnostic data:  Recent labs in [facility] reviewed by me today.     ASSESSMENT/PLAN:  (R53.81) Physical deconditioning  (primary encounter diagnosis)  Comment: Due to recurrent diarrhea. This is her 3rd hospitalization/TCU stay in less than 6 months for the same concerns. She has " been insistent on discharging home, but she may need a higher level of care now.   Plan: PT/OT eval and treat, discharge planning per their recommendations.    (K52.831) Collagenous colitis  Comment: Recurrent, has failed standard treatment. It appears that the plan is to leave the budesonide at 9mg with no taper at this time.  Plan: Continue current POC with no changes at this time and adjustments as needed. Follow up with [facility]    (G89.4) Chronic pain syndrome  Comment: Chronic condition being managed with medications and is currently asymptomatic.  Plan: Continue current POC with no changes at this time and adjustments as needed.    (G40.179) Seizure disorder (H)  Comment: Chronic condition being managed with medications.  Plan: Continue current POC with no changes at this time and adjustments as needed.      Electronically signed by:  DANIELLE Villanueva CNP                     Sincerely,        DANIELLE Villanueva CNP

## 2024-04-15 NOTE — PROGRESS NOTES
[facility] GERIATRICS    PRIMARY CARE PROVIDER AND CLINIC:  Mara Murillo MD, [address] [facility] [address]  Chief Complaint   Patient presents with    Hospital F/U      [facility] Medical Record Number:  [id number]  Place of Service where encounter took place:  [facility] (U) [[id number]]    Julisa Chaney  is a 78 year old  (1945), admitted to the above facility from  [facility]. Hospital stay 4/9/24 through 4/12/24. Patient with PMH trigeminal neuralgia with chronic pain, seizure disorder, GERD, HLD, depression and recurrent diarrhea with diagnosis of collagenous colits presented to the ED with worsening diarrhea, weakness, dehydration. Cdiff and stool pathogen panel negative. She was seen by GI and resumed budesonide.     HPI obtained from patient visit, review of nursing home record, discussion with facility staff, and Epic review.     HPI:    Patient is well known to this provider from previous TCU stays. She was sleeping soundly when provider arrived. Provider notes that she was home for about 2 months before going back to the hospital. Attempted to ask her when her diarrhea worsened again, but she repeatedly answers just about the last few days. She had no diarrhea the first couple of days here, but now it has started again. Per review of nursing home record, she has been receiving the budesonide. She had an imodium early this morning, this was the first one here. She has not been out of bed much. OT arrives at the end of visit.     CODE STATUS/ADVANCE DIRECTIVES DISCUSSION:  Full Code    ALLERGIES:   Allergies   Allergen Reactions    Contrast [Iohexol] Rash     Noticed during 08/2020 admission. Received IV contrast on 8/5    Chocolate Headache    Contrast Dye Rash    Penicillins Rash      PAST MEDICAL HISTORY:   Past Medical History:   Diagnosis Date    Aspiration pneumonia (H) 02/2022    Depression     High cholesterol     Migraines     Pyloric ulcer, chronic     Sepsis (H)  08/10/2020    Trigeminal neuralgia       PAST SURGICAL HISTORY:   has a past surgical history that includes Colonoscopy w/wo Brush **Performed** (N/A, 8/13/2020); Pr Esophagogastroduodenoscopy Transoral Diagnostic (N/A, 8/13/2020); Hysterectomy; Pr Esophagogastroduodenoscopy Transoral Diagnostic (N/A, 8/14/2020); PICC/Midline Placement (7/22/2022); Ventriculostomy (Left, 7/31/2022); IR Gastrostomy Tube Percutaneous Plcmnt (8/16/2022); and Colonoscopy (N/A, 1/19/2024).  FAMILY HISTORY: family history includes Breast Cancer in her maternal aunt, mother, sister, and sister; Cancer in her father; Testicular cancer (age of onset: 17.00) in her grandchild.  SOCIAL HISTORY:   reports that she quit smoking about 9 years ago. She started smoking about 59 years ago. She has a 50 pack-year smoking history. She has never used smokeless tobacco. She reports that she does not drink alcohol and does not use drugs.  Patient's living condition: lives alone    Post Discharge Medication Reconciliation Status:   MED REC REQUIRED  Post Medication Reconciliation Status:  Discharge medications reconciled, continue medications without change       Current Outpatient Medications   Medication Sig Dispense Refill    acetaminophen-codeine (TYLENOL #3) 300-30 MG per tablet Take 1 tablet by mouth every 6 hours as needed for severe pain 30 tablet 0    budesonide (UCERIS) 9 MG 24 hr tablet Take 9 mg by mouth every morning      ferrous sulfate (FEROSUL) 325 (65 Fe) MG tablet Take 1 tablet (325 mg) by mouth daily (with breakfast) 90 tablet 1    gabapentin (NEURONTIN) 100 MG capsule Take 100 mg by mouth daily      gabapentin (NEURONTIN) 100 MG capsule Take 300 mg by mouth at bedtime      levETIRAcetam (KEPPRA) 750 MG tablet Take 1 tablet (750 mg) by mouth 2 times daily 60 tablet 0    loperamide (IMODIUM) 2 MG capsule Take 2 mg by mouth 4 times daily as needed for diarrhea      mirtazapine (REMERON) 15 MG tablet Take 1 tablet (15 mg) by mouth at  "bedtime 30 tablet 0    Multiple Vitamin (MULTIVITAMIN) TABS TAKE 1 TABLET BY MOUTH EVERY  tablet 3    nortriptyline (PAMELOR) 50 MG capsule Take 1 capsule (50 mg) by mouth at bedtime 90 capsule 3    omeprazole (PRILOSEC) 40 MG DR capsule Take 1 capsule (40 mg) by mouth daily 90 capsule 1    OXcarbazepine (TRILEPTAL) 150 MG tablet TAKE 3 TABLETS(450 MG) BY MOUTH DAILY 270 tablet 1    potassium chloride elin ER (KLOR-CON M20) 20 MEQ CR tablet Take 1 tablet (20 mEq) by mouth daily 90 tablet 1    psyllium (METAMUCIL) 58.6 % packet Take 1 packet by mouth daily      QUEtiapine (SEROQUEL) 50 MG tablet Take 1 tablet (50 mg) by mouth at bedtime 30 tablet 0    topiramate (TOPAMAX) 50 MG tablet Take 1 tablet (50 mg) by mouth at bedtime 30 tablet 0    Vitamin D3 50 mcg (2000 units) tablet Take 1 tablet (50 mcg) by mouth daily 90 tablet 1     No current facility-administered medications for this visit.       ROS:  4 point ROS including Respiratory, CV, GI and , other than that noted in the HPI,  is negative    Vitals:  /60   Pulse 94   Temp 97.2  F (36.2  C)   Resp 16   Ht 1.676 m (5' 6\")   Wt 44 kg (97 lb)   SpO2 90%   BMI 15.66 kg/m    Exam:  GENERAL APPEARANCE:  Alert, in no distress, thin  ENT:  Mouth and posterior oropharynx normal, moist mucous membranes  RESP:  lungs clear to auscultation , no respiratory distress  CV:  Palpation and auscultation of heart done , regular rate and rhythm, no murmur, rub, or gallop, no edema  ABDOMEN:  soft, non-tender, no distension  PSYCH:  oriented X 3, memory impaired     Lab/Diagnostic data:  Recent labs in [facility] reviewed by me today.     ASSESSMENT/PLAN:  (R53.81) Physical deconditioning  (primary encounter diagnosis)  Comment: Due to recurrent diarrhea. This is her 3rd hospitalization/TCU stay in less than 6 months for the same concerns. She has been insistent on discharging home, but she may need a higher level of care now.   Plan: PT/OT eval and treat, discharge " planning per their recommendations.    (K52.781) Collagenous colitis  Comment: Recurrent, has failed standard treatment. It appears that the plan is to leave the budesonide at 9mg with no taper at this time.  Plan: Continue current POC with no changes at this time and adjustments as needed. Follow up with [facility]    (G89.4) Chronic pain syndrome  Comment: Chronic condition being managed with medications and is currently asymptomatic.  Plan: Continue current POC with no changes at this time and adjustments as needed.    (T25.955) Seizure disorder (H)  Comment: Chronic condition being managed with medications.  Plan: Continue current POC with no changes at this time and adjustments as needed.      Electronically signed by:  DANIELLE Villanueva CNP

## 2024-04-16 ENCOUNTER — TRANSFERRED RECORDS (OUTPATIENT)
Dept: HEALTH INFORMATION MANAGEMENT | Facility: CLINIC | Age: 79
End: 2024-04-16
Payer: COMMERCIAL

## 2024-04-17 NOTE — PROGRESS NOTES
[facility] GERIATRICS    Chief Complaint   Patient presents with    RECHECK     HPI:  Julisa Chaney is a 78 year old  (1945), who is being seen today for an episodic care visit at: [facility] (TCU) [[id number]]. [facility] stay 1/17/24 through 1/24/24.   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 She was at this TCU in Oct/Nov after a hospitalization for norovirus. When she discharged from this TCU, she was still having loose stools, advised to take imodium. She presented to the ED 1/17/24 due to ongoing diarrhea that never resolved. She was admitted with dehydration, hypotension, and electrolyte abnormalities. She had a colonoscopy on 1/19/24, biopsy showed collagenous colitis. She was started on budesonide and high fiber diet.     Today's concern is:   Order was given to schedule imodium, this likely started 2/1/24. She is still having watery diarrhea throughout the day. She cannot tell when it is coming, she is incontinent. Her daughter sent a My Chart message with her concerns about this. Julisa is feeling very discouraged. She was hoping that the diarrhea was getting better. She does not see [facility] until April. She is working with therapy. When she was here previously, [facility] was recommended, but she declined. She is considering it, but has a lot to do at her house before she is ready to move.     Allergies, and PMH/PSH reviewed in EPIC today.  REVIEW OF SYSTEMS:  4 point ROS including Respiratory, CV, GI and , other than that noted in the HPI,  is negative    Objective:   /54   Pulse 75   Temp 97.4  F (36.3  C)   Resp 16   GENERAL APPEARANCE:  Alert, in no distress, thin  ENT:  Mouth and posterior oropharynx normal, moist mucous membranes  RESP:  no respiratory distress  ABDOMEN:  soft, non-tender  PSYCH:  oriented X 3, affect and mood normal    Recent labs in [facility] reviewed by me today.      Assessment/Plan:  (R53.81) Physical deconditioning  (primary encounter diagnosis)  Comment: Improving, but agree with her daughter that if her diarrhea continues as it is, she may not be safe to go home. She would be high risk for skin breakdown, falls, dehydration, electrolyte imbalance  Plan: PT/OT eval and treat, discharge planning per their recommendations.    (K57.831) Collagenous colitis  Comment: Diarrhea ongoing. Scheduled imodium with a taper every few days to avoid bowel obstruction, but can increase dosing again if needed. Would expect diarrhea to improve with budesonide. It sounds as though she was having diarrhea at home despite taking imodium, so may need to try lomotil if no improvement with imodium  Plan: Continue current POC with no changes at this time and adjustments as needed.    (E87.6) Hypopotassemia  Comment: Patient was receiving replacement in the hospital. Expect that this continues due to diarrhea  Plan: Kcl 20meq BID. BMP 2/5/24        Electronically signed by: DANIELLE Villanueva CNP        None known

## 2024-04-19 ENCOUNTER — TRANSITIONAL CARE UNIT VISIT (OUTPATIENT)
Dept: GERIATRICS | Facility: CLINIC | Age: 79
End: 2024-04-19
Payer: COMMERCIAL

## 2024-04-19 VITALS
RESPIRATION RATE: 18 BRPM | OXYGEN SATURATION: 92 % | HEIGHT: 66 IN | HEART RATE: 70 BPM | SYSTOLIC BLOOD PRESSURE: 95 MMHG | BODY MASS INDEX: 15.59 KG/M2 | TEMPERATURE: 97.1 F | WEIGHT: 97 LBS | DIASTOLIC BLOOD PRESSURE: 52 MMHG

## 2024-04-19 DIAGNOSIS — K52.831 COLLAGENOUS COLITIS: ICD-10-CM

## 2024-04-19 DIAGNOSIS — G50.0 TRIGEMINAL NEURALGIA: ICD-10-CM

## 2024-04-19 DIAGNOSIS — G89.4 CHRONIC PAIN SYNDROME: ICD-10-CM

## 2024-04-19 DIAGNOSIS — R53.81 PHYSICAL DECONDITIONING: Primary | ICD-10-CM

## 2024-04-19 LAB
ATRIAL RATE - MUSE: 88 BPM
DIASTOLIC BLOOD PRESSURE - MUSE: NORMAL MMHG
INTERPRETATION ECG - MUSE: NORMAL
P AXIS - MUSE: 73 DEGREES
PR INTERVAL - MUSE: 140 MS
QRS DURATION - MUSE: 74 MS
QT - MUSE: 340 MS
QTC - MUSE: 411 MS
R AXIS - MUSE: 64 DEGREES
SYSTOLIC BLOOD PRESSURE - MUSE: NORMAL MMHG
T AXIS - MUSE: -88 DEGREES
VENTRICULAR RATE- MUSE: 88 BPM

## 2024-04-19 PROCEDURE — 99309 SBSQ NF CARE MODERATE MDM 30: CPT | Performed by: NURSE PRACTITIONER

## 2024-04-19 RX ORDER — ACETAMINOPHEN AND CODEINE PHOSPHATE 300; 30 MG/1; MG/1
1 TABLET ORAL EVERY 6 HOURS PRN
Qty: 60 TABLET | Refills: 0 | Status: ON HOLD | OUTPATIENT
Start: 2024-04-19 | End: 2024-06-02

## 2024-04-19 NOTE — PROGRESS NOTES
"[facility] GERIATRICS    Chief Complaint   Patient presents with    RECHECK     HPI:  Julisa Chaney is a 78 year old  (1945), who is being seen today for an episodic care visit at: [facility] (U) [[id number]]. [facility] stay 4/9/24 through 4/12/24. Patient with PMH trigeminal neuralgia with chronic pain, seizure disorder, GERD, HLD, depression and recurrent diarrhea with diagnosis of collagenous colits presented to the ED with worsening diarrhea, weakness, dehydration. Cdiff and stool pathogen panel negative. She was seen by GI and resumed budesonide.     Today's concern is:   Patient reports that her stools have been a little more formed for a few days. She did go see GI, those record are not available for review, but she reports that she is to stay on the budesonide for the foreseeable future. Her insurance authorized another week of TCU, she is very happy about this. She is making progress. The nurse manager had noted that patient was complaining of severe back pain recently, was asking about getting stronger medication. Julisa does not remember saying this, but she does say that the pain will flare up from time to time. When that happens, she does yell out in pain sometimes. It always improves with position change or medication. The nurse may have seen her in one of those moments. Her pain is fine now. She does not need any adjustments.     Allergies, and PMH/PSH reviewed in [facility] today.  REVIEW OF SYSTEMS:  4 point ROS including Respiratory, CV, GI and , other than that noted in the HPI,  is negative    Objective:   BP 95/52   Pulse 70   Temp 97.1  F (36.2  C)   Resp 18   Ht 1.676 m (5' 6\")   Wt 44 kg (97 lb)   SpO2 92%   BMI 15.66 kg/m    GENERAL APPEARANCE:  Alert, in no distress, thin  EYES:  EOM normal, conjunctiva and lids normal  RESP:  no respiratory distress  CV:  no edema  ABDOMEN:  soft, non-tender  PSYCH:  oriented X 3, memory impaired     Recent labs in EPIC " reviewed by me today.     Assessment/Plan:  (R53.81) Physical deconditioning  (primary encounter diagnosis)  Comment: Due to sequela of recurrent diarrhea  Plan: PT/OT eval and treat, discharge planning per their recommendations.    (K56.573) Collagenous colitis  Comment: Improving with budesonide. Agree that she likely needs to stay on this long-term  Plan: Continue budesonide. Monitor bowel function. F/up with [facility] as directed    (G89.4) Chronic pain syndrome  Comment: Chronic, history of back pain and trigeminal neuralgia pain. Managed with medications.  Plan: Continue current POC with no changes at this time and adjustments as needed.      Electronically signed by: DANIELLE Villanueva CNP

## 2024-04-19 NOTE — LETTER
"    4/19/2024        RE: Julisa Chaney  [address]  [place] [address]        [facility]    Chief Complaint   Patient presents with     RECHECK     HPI:  Julisa Chaney is a 78 year old  (1945), who is being seen today for an episodic care visit at: [facility] [[id number]]. [facility] stay 4/9/24 through 4/12/24. Patient with PMH trigeminal neuralgia with chronic pain, seizure disorder, GERD, HLD, depression and recurrent diarrhea with diagnosis of collagenous colits presented to the ED with worsening diarrhea, weakness, dehydration. Cdiff and stool pathogen panel negative. She was seen by GI and resumed budesonide.     Today's concern is:   Patient reports that her stools have been a little more formed for a few days. She did go see GI, those record are not available for review, but she reports that she is to stay on the budesonide for the foreseeable future. Her insurance authorized another week of TCU, she is very happy about this. She is making progress. The nurse manager had noted that patient was complaining of severe back pain recently, was asking about getting stronger medication. Julisa does not remember saying this, but she does say that the pain will flare up from time to time. When that happens, she does yell out in pain sometimes. It always improves with position change or medication. The nurse may have seen her in one of those moments. Her pain is fine now. She does not need any adjustments.     Allergies, and PMH/PSH reviewed in EPIC today.  REVIEW OF SYSTEMS:  4 point ROS including Respiratory, CV, GI and , other than that noted in the HPI,  is negative    Objective:   BP 95/52   Pulse 70   Temp 97.1  F (36.2  C)   Resp 18   Ht 1.676 m (5' 6\")   Wt 44 kg (97 lb)   SpO2 92%   BMI 15.66 kg/m    GENERAL APPEARANCE:  Alert, in no distress, thin  EYES:  EOM normal, conjunctiva and lids normal  RESP:  no respiratory distress  CV:  no " edema  ABDOMEN:  soft, non-tender  PSYCH:  oriented X 3, memory impaired     Recent labs in EPIC reviewed by me today.     Assessment/Plan:  (R53.81) Physical deconditioning  (primary encounter diagnosis)  Comment: Due to sequela of recurrent diarrhea  Plan: PT/OT eval and treat, discharge planning per their recommendations.    (K52.301) Collagenous colitis  Comment: Improving with budesonide. Agree that she likely needs to stay on this long-term  Plan: Continue budesonide. Monitor bowel function. F/up with [facility] as directed    (G89.4) Chronic pain syndrome  Comment: Chronic, history of back pain and trigeminal neuralgia pain. Managed with medications.  Plan: Continue current POC with no changes at this time and adjustments as needed.      Electronically signed by: DANIELLE Villanueva CNP           Sincerely,        DANIELLE Villanueva CNP

## 2024-04-24 ENCOUNTER — TRANSITIONAL CARE UNIT VISIT (OUTPATIENT)
Dept: GERIATRICS | Facility: CLINIC | Age: 79
End: 2024-04-24
Payer: COMMERCIAL

## 2024-04-24 VITALS
WEIGHT: 90.8 LBS | HEART RATE: 58 BPM | HEIGHT: 66 IN | SYSTOLIC BLOOD PRESSURE: 103 MMHG | TEMPERATURE: 97.3 F | OXYGEN SATURATION: 94 % | RESPIRATION RATE: 16 BRPM | DIASTOLIC BLOOD PRESSURE: 47 MMHG | BODY MASS INDEX: 14.59 KG/M2

## 2024-04-24 DIAGNOSIS — K52.831 COLLAGENOUS COLITIS: ICD-10-CM

## 2024-04-24 DIAGNOSIS — R53.81 PHYSICAL DECONDITIONING: Primary | ICD-10-CM

## 2024-04-24 PROCEDURE — 99308 SBSQ NF CARE LOW MDM 20: CPT | Performed by: NURSE PRACTITIONER

## 2024-04-24 NOTE — LETTER
"    4/24/2024        RE: Julisa Chaney  [address]  [place] [address]        M [facility] GERIATRICS    Chief Complaint   Patient presents with     RECHECK     HPI:  Julisa Chaney is a 79 year old  (1945), who is being seen today for an episodic care visit at: [facility] [[id number]].     Today's concern is:   Patient reports that her stools continue to be more formed. She is very happy about this. She is making progress with therapy. She c/o severe pain at times, but has stated that she does not want any medication changes.     Allergies, and PMH/PSH reviewed in EPIC today.  REVIEW OF SYSTEMS:  4 point ROS including Respiratory, CV, GI and , other than that noted in the HPI,  is negative    Objective:   /47   Pulse 58   Temp 97.3  F (36.3  C)   Resp 16   Ht 1.676 m (5' 6\")   Wt 41.2 kg (90 lb 12.8 oz)   SpO2 94%   BMI 14.66 kg/m    GENERAL APPEARANCE:  Alert, in no distress, thin  RESP:  no respiratory distress  PSYCH:  oriented X 3, memory impaired     Recent labs in EPIC reviewed by me today.     Assessment/Plan:  (R53.81) Physical deconditioning  (primary encounter diagnosis)  Comment: Improving  Plan: PT/OT eval and treat, discharge planning per their recommendations.    (K53.567) Collagenous colitis  Comment: Improved bowel function due to budesonide. Plan is to continue current dose for the foreseeable future  Plan: Continue current POC with no changes at this time and adjustments as needed. F/up with [facility] as recommended      Electronically signed by: DANIELLE Villanueva CNP           Sincerely,        DANIELLE Villanueva CNP      "

## 2024-04-24 NOTE — PROGRESS NOTES
"[facility] GERIATRICS    Chief Complaint   Patient presents with    RECHECK     HPI:  Julisa Chaney is a 79 year old  (1945), who is being seen today for an episodic care visit at: [facility] ([facility]) [[id number]].     Today's concern is:   Patient reports that her stools continue to be more formed. She is very happy about this. She is making progress with therapy. She c/o severe pain at times, but has stated that she does not want any medication changes.     Allergies, and PMH/PSH reviewed in [facility] today.  REVIEW OF SYSTEMS:  4 point ROS including Respiratory, CV, GI and , other than that noted in the HPI,  is negative    Objective:   /47   Pulse 58   Temp 97.3  F (36.3  C)   Resp 16   Ht 1.676 m (5' 6\")   Wt 41.2 kg (90 lb 12.8 oz)   SpO2 94%   BMI 14.66 kg/m    GENERAL APPEARANCE:  Alert, in no distress, thin  RESP:  no respiratory distress  PSYCH:  oriented X 3, memory impaired     Recent labs in [facility] reviewed by me today.     Assessment/Plan:  (R53.81) Physical deconditioning  (primary encounter diagnosis)  Comment: Improving  Plan: PT/OT eval and treat, discharge planning per their recommendations.    (K5.911) Collagenous colitis  Comment: Improved bowel function due to budesonide. Plan is to continue current dose for the foreseeable future  Plan: Continue current POC with no changes at this time and adjustments as needed. F/up with [facility] as recommended      Electronically signed by: Tori Funez, DANIELLE CNP       "

## 2024-05-03 ENCOUNTER — DISCHARGE SUMMARY NURSING HOME (OUTPATIENT)
Dept: GERIATRICS | Facility: CLINIC | Age: 79
End: 2024-05-03
Payer: COMMERCIAL

## 2024-05-03 VITALS
DIASTOLIC BLOOD PRESSURE: 62 MMHG | OXYGEN SATURATION: 93 % | SYSTOLIC BLOOD PRESSURE: 122 MMHG | TEMPERATURE: 97.1 F | WEIGHT: 90.8 LBS | HEART RATE: 68 BPM | RESPIRATION RATE: 16 BRPM | BODY MASS INDEX: 14.59 KG/M2 | HEIGHT: 66 IN

## 2024-05-03 DIAGNOSIS — R53.81 PHYSICAL DECONDITIONING: Primary | ICD-10-CM

## 2024-05-03 DIAGNOSIS — K52.831 COLLAGENOUS COLITIS: ICD-10-CM

## 2024-05-03 DIAGNOSIS — G89.4 CHRONIC PAIN SYNDROME: ICD-10-CM

## 2024-05-03 DIAGNOSIS — R41.89 COGNITIVE IMPAIRMENT: ICD-10-CM

## 2024-05-03 PROCEDURE — 99316 NF DSCHRG MGMT 30 MIN+: CPT | Performed by: NURSE PRACTITIONER

## 2024-05-03 NOTE — PROGRESS NOTES
[facility] GERIATRICS DISCHARGE SUMMARY  PATIENT'S NAME: Julisa Chaney  YOB: 1945  MEDICAL RECORD NUMBER:  [id number]  Place of Service where encounter took place:  [facility] (U) [[id number]]    PRIMARY CARE PROVIDER AND CLINIC RESPONSIBLE AFTER TRANSFER:   Mara Murillo MD, [address] [facility] [address]    [facility] Provider     Transferring providers: DANIELLE Villanueva CNP, Clementine Berumen MD  Recent Hospitalization/ED:  [facility] stay 4/9/24 through 4/12/24.   Date of SNF Admission: April 12, 2024  Date of SNF (anticipated) Discharge: May 06, 2024  Discharged to: previous independent home  Cognitive Scores: SLUMS: 18/30      CODE STATUS/ADVANCE DIRECTIVES DISCUSSION:  Full Code   ALLERGIES: Contrast [iohexol], Chocolate, Contrast dye, and Penicillins    NURSING FACILITY COURSE   Medication Changes/Rationale:   none    Summary of nursing facility stay:   Julisa Chaney  is a 78 year old  (1945), admitted to the above facility from  [facility]. Hospital stay 4/9/24 through 4/12/24. Patient with PMH trigeminal neuralgia with chronic pain, seizure disorder, GERD, HLD, depression and recurrent diarrhea with diagnosis of collagenous colits presented to the ED with worsening diarrhea, weakness, dehydration. Cdiff and stool pathogen panel negative. She was seen by GI and resumed budesonide.     Physical deconditioning  Cognitive impairment  This is the patient's 3rd TCU stay in the past 6 months. Care team has consistently recommended residential, but she declines. She and her daughter feel she does fine at home. She is functionally safe to discharge, but SLUMS 18/30. Patient denies that she has cognitive impairment. She blames her score on her Tylenol #3. There is also concern due to her frequent hospitalizations. Provider generally sees her between 9am-10am and she is always sound asleep.     Collagenous colitis  Diarrhea has improved with budesonide. She saw [facility], they  recommended she continue treatment for 2 months. Budesonide will end 6/17/24.     Chronic pain syndrome  Patient intermittently c/o severe pain, but this is fleeting. She did not want any medication adjustments      Discharge Medications:  Current Outpatient Medications   Medication Sig Dispense Refill    acetaminophen-codeine (TYLENOL #3) 300-30 MG per tablet Take 1 tablet by mouth every 6 hours as needed for severe pain 60 tablet 0    budesonide (UCERIS) 9 MG 24 hr tablet Take 9 mg by mouth every morning      ferrous sulfate (FEROSUL) 325 (65 Fe) MG tablet Take 1 tablet (325 mg) by mouth daily (with breakfast) 90 tablet 1    gabapentin (NEURONTIN) 100 MG capsule Take 100 mg by mouth daily      gabapentin (NEURONTIN) 100 MG capsule Take 300 mg by mouth at bedtime      levETIRAcetam (KEPPRA) 750 MG tablet Take 1 tablet (750 mg) by mouth 2 times daily 60 tablet 0    loperamide (IMODIUM) 2 MG capsule Take 2 mg by mouth 4 times daily as needed for diarrhea      mirtazapine (REMERON) 15 MG tablet Take 1 tablet (15 mg) by mouth at bedtime 30 tablet 0    Multiple Vitamin (MULTIVITAMIN) TABS TAKE 1 TABLET BY MOUTH EVERY  tablet 3    nortriptyline (PAMELOR) 50 MG capsule Take 1 capsule (50 mg) by mouth at bedtime 90 capsule 3    OXcarbazepine (TRILEPTAL) 150 MG tablet TAKE 3 TABLETS(450 MG) BY MOUTH DAILY 270 tablet 1    potassium chloride elin ER (KLOR-CON M20) 20 MEQ CR tablet Take 1 tablet (20 mEq) by mouth daily 90 tablet 1    psyllium (METAMUCIL) 58.6 % packet Take 1 packet by mouth daily      QUEtiapine (SEROQUEL) 50 MG tablet Take 1 tablet (50 mg) by mouth at bedtime 30 tablet 0    topiramate (TOPAMAX) 50 MG tablet Take 1 tablet (50 mg) by mouth at bedtime 30 tablet 0    Vitamin D3 50 mcg (2000 units) tablet Take 1 tablet (50 mcg) by mouth daily 90 tablet 1        Controlled medications:   OK to send remaining Tylenol #3     Past Medical History:   Past Medical History:   Diagnosis Date    Aspiration pneumonia  "(H) 02/2022    Depression     High cholesterol     Migraines     Pyloric ulcer, chronic     Sepsis (H) 08/10/2020    Trigeminal neuralgia      Physical Exam:   Vitals: /62   Pulse 68   Temp 97.1  F (36.2  C)   Resp 16   Ht 1.676 m (5' 6\")   Wt 41.2 kg (90 lb 12.8 oz)   SpO2 93%   BMI 14.66 kg/m    BMI: Body mass index is 14.66 kg/m .  GENERAL APPEARANCE:  Sleeping soundly, eventually arouses to touch, no apparent distress  ENT:  Mouth and posterior oropharynx normal, moist mucous membranes, [place]  EYES:  EOM normal, conjunctiva and lids normal  RESP:  no respiratory distress  CV:  no edema  ABDOMEN:  soft, non-tender  PSYCH:  insight and judgement impaired, memory impaired      SNF labs: Labs done in SNF are in [facility] EPIC. Please refer to them using [employer]/Care Everywhere.    DISCHARGE PLAN:  Follow up labs: No labs orders/due  Medical Follow Up:      Follow up with primary care provider in 1-2 weeks  Discharge Services: Home Care:  Occupational Therapy, Physical Therapy, Registered Nurse, and Home Health Aide      TOTAL DISCHARGE TIME:   Greater than 30 minutes  Electronically signed by:  DANIELLE Villanueva CNP       Documentation of Face to Face and Certification for Home Health Services    I certify that services are/were furnished while this patient was under the care of a physician and that a physician or an allowed non-physician practitioner (NPP), had a face-to-face encounter that meets the physician face-to-face encounter requirements. The encounter was in whole, or in part, related to the primary reason for home health. The patient is confined to his/her home and needs intermittent skilled nursing, physical therapy, speech-language pathology, or the continued need for occupational therapy. A plan of care has been established by a physician and is periodically reviewed by a physician.  Date of Face-to-Face Encounter: 5/3/2024.    I certify that, based on my findings, the following services are " medically necessary home health services: Nursing, Occupational Therapy, and Physical Therapy.    My clinical findings support the need for the above skilled services because: Requires assistance of another person or specialized equipment to access medical services because patient: Is prone to wander/get lost without assistance. and Is unable to walk greater than 150 feet without rest...    Patient to re-establish plan of care with their PCP within 7-10 days after leaving the facility to reestablish care.  Medicare certified PECOS provider: DANIELLE Villanueva CNP  Date: May 3, 2024

## 2024-05-03 NOTE — LETTER
5/3/2024        RE: Julisa Chaney  [address]  [place] [address]        [facility] GERIATRICS DISCHARGE SUMMARY  PATIENT'S NAME: Julisa Chaney  YOB: 1945  MEDICAL RECORD NUMBER:  [id number]  Place of Service where encounter took place:  [facility] ([facility]) [[id number]]    PRIMARY CARE PROVIDER AND CLINIC RESPONSIBLE AFTER TRANSFER:   Mara Murillo MD, [address] [facility] [address]    [facility] Provider     Transferring providers: DANIELLE Villanueva CNP, Clementine Berumen MD  Recent Hospitalization/ED:  [facility] stay 4/9/24 through 4/12/24.   Date of SNF Admission: April 12, 2024  Date of SNF (anticipated) Discharge: May 06, 2024  Discharged to: previous independent home  Cognitive Scores: SLUMS: 18/30      CODE STATUS/ADVANCE DIRECTIVES DISCUSSION:  Full Code   ALLERGIES: Contrast [iohexol], Chocolate, Contrast dye, and Penicillins    NURSING FACILITY COURSE   Medication Changes/Rationale:   none    Summary of nursing facility stay:   Julisa Chaney  is a 78 year old  (1945), admitted to the above facility from  [facility]. Hospital stay 4/9/24 through 4/12/24. Patient with PMH trigeminal neuralgia with chronic pain, seizure disorder, GERD, HLD, depression and recurrent diarrhea with diagnosis of collagenous colits presented to the ED with worsening diarrhea, weakness, dehydration. Cdiff and stool pathogen panel negative. She was seen by GI and resumed budesonide.     Physical deconditioning  Cognitive impairment  This is the patient's 3rd TCU stay in the past 6 months. Care team has consistently recommended [facility], but she declines. She and her daughter feel she does fine at home. She is functionally safe to discharge, but SLUMS 18/30. Patient denies that she has cognitive impairment. She blames her score on her Tylenol #3. There is also concern due to her frequent hospitalizations. Provider generally sees her between 9am-10am and she is always sound  asleep.     Collagenous colitis  Diarrhea has improved with budesonide. She saw [facility], they recommended she continue treatment for 2 months. Budesonide will end 6/17/24.     Chronic pain syndrome  Patient intermittently c/o severe pain, but this is fleeting. She did not want any medication adjustments      Discharge Medications:  Current Outpatient Medications   Medication Sig Dispense Refill     acetaminophen-codeine (TYLENOL #3) 300-30 MG per tablet Take 1 tablet by mouth every 6 hours as needed for severe pain 60 tablet 0     budesonide (UCERIS) 9 MG 24 hr tablet Take 9 mg by mouth every morning       ferrous sulfate (FEROSUL) 325 (65 Fe) MG tablet Take 1 tablet (325 mg) by mouth daily (with breakfast) 90 tablet 1     gabapentin (NEURONTIN) 100 MG capsule Take 100 mg by mouth daily       gabapentin (NEURONTIN) 100 MG capsule Take 300 mg by mouth at bedtime       levETIRAcetam (KEPPRA) 750 MG tablet Take 1 tablet (750 mg) by mouth 2 times daily 60 tablet 0     loperamide (IMODIUM) 2 MG capsule Take 2 mg by mouth 4 times daily as needed for diarrhea       mirtazapine (REMERON) 15 MG tablet Take 1 tablet (15 mg) by mouth at bedtime 30 tablet 0     Multiple Vitamin (MULTIVITAMIN) TABS TAKE 1 TABLET BY MOUTH EVERY  tablet 3     nortriptyline (PAMELOR) 50 MG capsule Take 1 capsule (50 mg) by mouth at bedtime 90 capsule 3     OXcarbazepine (TRILEPTAL) 150 MG tablet TAKE 3 TABLETS(450 MG) BY MOUTH DAILY 270 tablet 1     potassium chloride elin ER (KLOR-CON M20) 20 MEQ CR tablet Take 1 tablet (20 mEq) by mouth daily 90 tablet 1     psyllium (METAMUCIL) 58.6 % packet Take 1 packet by mouth daily       QUEtiapine (SEROQUEL) 50 MG tablet Take 1 tablet (50 mg) by mouth at bedtime 30 tablet 0     topiramate (TOPAMAX) 50 MG tablet Take 1 tablet (50 mg) by mouth at bedtime 30 tablet 0     Vitamin D3 50 mcg (2000 units) tablet Take 1 tablet (50 mcg) by mouth daily 90 tablet 1        Controlled medications:   OK to send  "remaining Tylenol #3     Past Medical History:   Past Medical History:   Diagnosis Date     Aspiration pneumonia (H) 02/2022     Depression      High cholesterol      Migraines      Pyloric ulcer, chronic      Sepsis (H) 08/10/2020     Trigeminal neuralgia      Physical Exam:   Vitals: /62   Pulse 68   Temp 97.1  F (36.2  C)   Resp 16   Ht 1.676 m (5' 6\")   Wt 41.2 kg (90 lb 12.8 oz)   SpO2 93%   BMI 14.66 kg/m    BMI: Body mass index is 14.66 kg/m .  GENERAL APPEARANCE:  Sleeping soundly, eventually arouses to touch, no apparent distress  ENT:  Mouth and posterior oropharynx normal, moist mucous membranes, [place]  EYES:  EOM normal, conjunctiva and lids normal  RESP:  no respiratory distress  CV:  no edema  ABDOMEN:  soft, non-tender  PSYCH:  insight and judgement impaired, memory impaired      SNF labs: Labs done in SNF are in [facility] EPIC. Please refer to them using [employer]/Care Everywhere.    DISCHARGE PLAN:  Follow up labs: No labs orders/due  Medical Follow Up:      Follow up with primary care provider in 1-2 weeks  Discharge Services: Home Care:  Occupational Therapy, Physical Therapy, Registered Nurse, and Home Health Aide      TOTAL DISCHARGE TIME:   Greater than 30 minutes  Electronically signed by:  DANIELLE Villanueva CNP       Documentation of Face to Face and Certification for Home Health Services    I certify that services are/were furnished while this patient was under the care of a physician and that a physician or an allowed non-physician practitioner (NPP), had a face-to-face encounter that meets the physician face-to-face encounter requirements. The encounter was in whole, or in part, related to the primary reason for home health. The patient is confined to his/her home and needs intermittent skilled nursing, physical therapy, speech-language pathology, or the continued need for occupational therapy. A plan of care has been established by a physician and is periodically reviewed by a " physician.  Date of Face-to-Face Encounter: 5/3/2024.    I certify that, based on my findings, the following services are medically necessary home health services: Nursing, Occupational Therapy, and Physical Therapy.    My clinical findings support the need for the above skilled services because: Requires assistance of another person or specialized equipment to access medical services because patient: Is prone to wander/get lost without assistance. and Is unable to walk greater than 150 feet without rest...    Patient to re-establish plan of care with their PCP within 7-10 days after leaving the facility to reestablish care.  Medicare certified PECOS provider: DANIELLE Villnaueva CNP  Date: May 3, 2024                  Sincerely,        DANIELLE Villanueva CNP

## 2024-05-05 DIAGNOSIS — G43.719 INTRACTABLE CHRONIC MIGRAINE WITHOUT AURA AND WITHOUT STATUS MIGRAINOSUS: ICD-10-CM

## 2024-05-05 DIAGNOSIS — G47.00 PERSISTENT INSOMNIA: ICD-10-CM

## 2024-05-06 RX ORDER — TOPIRAMATE 50 MG/1
50 TABLET, FILM COATED ORAL AT BEDTIME
Qty: 90 TABLET | OUTPATIENT
Start: 2024-05-06

## 2024-05-06 RX ORDER — LEVETIRACETAM 750 MG/1
750 TABLET ORAL 2 TIMES DAILY
Qty: 180 TABLET | OUTPATIENT
Start: 2024-05-06

## 2024-05-28 ENCOUNTER — APPOINTMENT (OUTPATIENT)
Dept: CT IMAGING | Facility: HOSPITAL | Age: 79
DRG: 871 | End: 2024-05-28
Payer: COMMERCIAL

## 2024-05-28 ENCOUNTER — HOSPITAL ENCOUNTER (INPATIENT)
Facility: HOSPITAL | Age: 79
LOS: 5 days | Discharge: NURSING, REHAB OR LONG-TERM CARE | DRG: 871 | End: 2024-06-03
Attending: EMERGENCY MEDICINE | Admitting: INTERNAL MEDICINE
Payer: COMMERCIAL

## 2024-05-28 DIAGNOSIS — J18.9 MULTIFOCAL PNEUMONIA: ICD-10-CM

## 2024-05-28 DIAGNOSIS — D72.825 BANDEMIA: ICD-10-CM

## 2024-05-28 DIAGNOSIS — K29.00 ACUTE GASTRITIS WITHOUT HEMORRHAGE, UNSPECIFIED GASTRITIS TYPE: ICD-10-CM

## 2024-05-28 DIAGNOSIS — G50.0 TRIGEMINAL NEURALGIA: ICD-10-CM

## 2024-05-28 DIAGNOSIS — J15.5: ICD-10-CM

## 2024-05-28 DIAGNOSIS — J96.01 ACUTE RESPIRATORY FAILURE WITH HYPOXIA (H): ICD-10-CM

## 2024-05-28 DIAGNOSIS — K52.831 COLLAGENOUS COLITIS: Primary | ICD-10-CM

## 2024-05-28 DIAGNOSIS — R65.10 SIRS (SYSTEMIC INFLAMMATORY RESPONSE SYNDROME) (H): ICD-10-CM

## 2024-05-28 DIAGNOSIS — N39.0 ACUTE UTI: ICD-10-CM

## 2024-05-28 DIAGNOSIS — G89.4 CHRONIC PAIN SYNDROME: ICD-10-CM

## 2024-05-28 LAB
ALBUMIN SERPL BCG-MCNC: 3.7 G/DL (ref 3.5–5.2)
ALBUMIN UR-MCNC: 30 MG/DL
ALP SERPL-CCNC: 151 U/L (ref 40–150)
ALT SERPL W P-5'-P-CCNC: 25 U/L (ref 0–50)
ANION GAP SERPL CALCULATED.3IONS-SCNC: 10 MMOL/L (ref 7–15)
ANION GAP SERPL CALCULATED.3IONS-SCNC: 11 MMOL/L (ref 7–15)
APPEARANCE UR: CLEAR
AST SERPL W P-5'-P-CCNC: 19 U/L (ref 0–45)
BACTERIA #/AREA URNS HPF: ABNORMAL /HPF
BASE EXCESS BLDV CALC-SCNC: -2.2 MMOL/L (ref -3–3)
BASOPHILS # BLD AUTO: 0.1 10E3/UL (ref 0–0.2)
BASOPHILS NFR BLD AUTO: 0 %
BILIRUB SERPL-MCNC: 0.2 MG/DL
BILIRUB UR QL STRIP: NEGATIVE
BUN SERPL-MCNC: 15.8 MG/DL (ref 8–23)
BUN SERPL-MCNC: 16.2 MG/DL (ref 8–23)
CALCIUM SERPL-MCNC: 9.5 MG/DL (ref 8.8–10.2)
CALCIUM SERPL-MCNC: 9.5 MG/DL (ref 8.8–10.2)
CHLORIDE SERPL-SCNC: 100 MMOL/L (ref 98–107)
CHLORIDE SERPL-SCNC: 99 MMOL/L (ref 98–107)
COLOR UR AUTO: YELLOW
CREAT SERPL-MCNC: 0.55 MG/DL (ref 0.51–0.95)
CREAT SERPL-MCNC: 0.58 MG/DL (ref 0.51–0.95)
DEPRECATED HCO3 PLAS-SCNC: 22 MMOL/L (ref 22–29)
DEPRECATED HCO3 PLAS-SCNC: 25 MMOL/L (ref 22–29)
EGFRCR SERPLBLD CKD-EPI 2021: >90 ML/MIN/1.73M2
EGFRCR SERPLBLD CKD-EPI 2021: >90 ML/MIN/1.73M2
EOSINOPHIL # BLD AUTO: 0.1 10E3/UL (ref 0–0.7)
EOSINOPHIL NFR BLD AUTO: 0 %
ERYTHROCYTE [DISTWIDTH] IN BLOOD BY AUTOMATED COUNT: 16.3 % (ref 10–15)
FLUAV RNA SPEC QL NAA+PROBE: NEGATIVE
FLUBV RNA RESP QL NAA+PROBE: NEGATIVE
GLUCOSE SERPL-MCNC: 149 MG/DL (ref 70–99)
GLUCOSE SERPL-MCNC: 210 MG/DL (ref 70–99)
GLUCOSE UR STRIP-MCNC: NEGATIVE MG/DL
HCO3 BLDV-SCNC: 23 MMOL/L (ref 21–28)
HCT VFR BLD AUTO: 43.5 % (ref 35–47)
HGB BLD-MCNC: 13.2 G/DL (ref 11.7–15.7)
HGB UR QL STRIP: ABNORMAL
HOLD SPECIMEN: NORMAL
HOLD SPECIMEN: NORMAL
IMM GRANULOCYTES # BLD: 0.2 10E3/UL
IMM GRANULOCYTES NFR BLD: 1 %
KETONES UR STRIP-MCNC: NEGATIVE MG/DL
LACTATE SERPL-SCNC: 1.2 MMOL/L (ref 0.7–2)
LEUKOCYTE ESTERASE UR QL STRIP: ABNORMAL
LYMPHOCYTES # BLD AUTO: 0.9 10E3/UL (ref 0.8–5.3)
LYMPHOCYTES NFR BLD AUTO: 4 %
MCH RBC QN AUTO: 26.8 PG (ref 26.5–33)
MCHC RBC AUTO-ENTMCNC: 30.3 G/DL (ref 31.5–36.5)
MCV RBC AUTO: 88 FL (ref 78–100)
MONOCYTES # BLD AUTO: 1 10E3/UL (ref 0–1.3)
MONOCYTES NFR BLD AUTO: 4 %
NEUTROPHILS # BLD AUTO: 22.5 10E3/UL (ref 1.6–8.3)
NEUTROPHILS NFR BLD AUTO: 91 %
NITRATE UR QL: POSITIVE
NRBC # BLD AUTO: 0 10E3/UL
NRBC BLD AUTO-RTO: 0 /100
O2/TOTAL GAS SETTING VFR VENT: 21 %
OXYHGB MFR BLDV: 89 % (ref 70–75)
PCO2 BLDV: 39 MM HG (ref 40–50)
PH BLDV: 7.38 [PH] (ref 7.32–7.43)
PH UR STRIP: 5.5 [PH] (ref 5–7)
PLATELET # BLD AUTO: 283 10E3/UL (ref 150–450)
PO2 BLDV: 56 MM HG (ref 25–47)
POTASSIUM SERPL-SCNC: 4.2 MMOL/L (ref 3.4–5.3)
POTASSIUM SERPL-SCNC: 4.3 MMOL/L (ref 3.4–5.3)
PROT SERPL-MCNC: 7.6 G/DL (ref 6.4–8.3)
RBC # BLD AUTO: 4.92 10E6/UL (ref 3.8–5.2)
RBC URINE: 1 /HPF
RSV RNA SPEC NAA+PROBE: NEGATIVE
SAO2 % BLDV: 89.6 % (ref 70–75)
SARS-COV-2 RNA RESP QL NAA+PROBE: NEGATIVE
SODIUM SERPL-SCNC: 133 MMOL/L (ref 135–145)
SODIUM SERPL-SCNC: 134 MMOL/L (ref 135–145)
SP GR UR STRIP: 1.02 (ref 1–1.03)
TROPONIN T SERPL HS-MCNC: 17 NG/L
TROPONIN T SERPL HS-MCNC: 20 NG/L
UROBILINOGEN UR STRIP-MCNC: <2 MG/DL
WBC # BLD AUTO: 24.6 10E3/UL (ref 4–11)
WBC URINE: 18 /HPF

## 2024-05-28 PROCEDURE — 87186 SC STD MICRODIL/AGAR DIL: CPT | Performed by: STUDENT IN AN ORGANIZED HEALTH CARE EDUCATION/TRAINING PROGRAM

## 2024-05-28 PROCEDURE — 99285 EMERGENCY DEPT VISIT HI MDM: CPT | Mod: 25

## 2024-05-28 PROCEDURE — 250N000013 HC RX MED GY IP 250 OP 250 PS 637: Performed by: STUDENT IN AN ORGANIZED HEALTH CARE EDUCATION/TRAINING PROGRAM

## 2024-05-28 PROCEDURE — 96366 THER/PROPH/DIAG IV INF ADDON: CPT

## 2024-05-28 PROCEDURE — 83605 ASSAY OF LACTIC ACID: CPT

## 2024-05-28 PROCEDURE — 85025 COMPLETE CBC W/AUTO DIFF WBC: CPT | Performed by: EMERGENCY MEDICINE

## 2024-05-28 PROCEDURE — 250N000011 HC RX IP 250 OP 636: Performed by: EMERGENCY MEDICINE

## 2024-05-28 PROCEDURE — 36415 COLL VENOUS BLD VENIPUNCTURE: CPT | Performed by: STUDENT IN AN ORGANIZED HEALTH CARE EDUCATION/TRAINING PROGRAM

## 2024-05-28 PROCEDURE — 258N000003 HC RX IP 258 OP 636

## 2024-05-28 PROCEDURE — 80053 COMPREHEN METABOLIC PANEL: CPT | Performed by: STUDENT IN AN ORGANIZED HEALTH CARE EDUCATION/TRAINING PROGRAM

## 2024-05-28 PROCEDURE — 93005 ELECTROCARDIOGRAM TRACING: CPT | Performed by: EMERGENCY MEDICINE

## 2024-05-28 PROCEDURE — 80048 BASIC METABOLIC PNL TOTAL CA: CPT | Performed by: STUDENT IN AN ORGANIZED HEALTH CARE EDUCATION/TRAINING PROGRAM

## 2024-05-28 PROCEDURE — 80048 BASIC METABOLIC PNL TOTAL CA: CPT | Performed by: EMERGENCY MEDICINE

## 2024-05-28 PROCEDURE — 82533 TOTAL CORTISOL: CPT | Performed by: INTERNAL MEDICINE

## 2024-05-28 PROCEDURE — 81001 URINALYSIS AUTO W/SCOPE: CPT | Performed by: STUDENT IN AN ORGANIZED HEALTH CARE EDUCATION/TRAINING PROGRAM

## 2024-05-28 PROCEDURE — 87040 BLOOD CULTURE FOR BACTERIA: CPT

## 2024-05-28 PROCEDURE — 96365 THER/PROPH/DIAG IV INF INIT: CPT | Mod: 59

## 2024-05-28 PROCEDURE — 36415 COLL VENOUS BLD VENIPUNCTURE: CPT

## 2024-05-28 PROCEDURE — 82805 BLOOD GASES W/O2 SATURATION: CPT

## 2024-05-28 PROCEDURE — 96375 TX/PRO/DX INJ NEW DRUG ADDON: CPT

## 2024-05-28 PROCEDURE — 87637 SARSCOV2&INF A&B&RSV AMP PRB: CPT | Performed by: EMERGENCY MEDICINE

## 2024-05-28 PROCEDURE — 84443 ASSAY THYROID STIM HORMONE: CPT | Performed by: INTERNAL MEDICINE

## 2024-05-28 PROCEDURE — 84484 ASSAY OF TROPONIN QUANT: CPT

## 2024-05-28 PROCEDURE — 250N000011 HC RX IP 250 OP 636

## 2024-05-28 PROCEDURE — 71260 CT THORAX DX C+: CPT

## 2024-05-28 PROCEDURE — 93005 ELECTROCARDIOGRAM TRACING: CPT | Performed by: STUDENT IN AN ORGANIZED HEALTH CARE EDUCATION/TRAINING PROGRAM

## 2024-05-28 PROCEDURE — 85025 COMPLETE CBC W/AUTO DIFF WBC: CPT | Performed by: STUDENT IN AN ORGANIZED HEALTH CARE EDUCATION/TRAINING PROGRAM

## 2024-05-28 PROCEDURE — 87086 URINE CULTURE/COLONY COUNT: CPT | Performed by: STUDENT IN AN ORGANIZED HEALTH CARE EDUCATION/TRAINING PROGRAM

## 2024-05-28 PROCEDURE — 96361 HYDRATE IV INFUSION ADD-ON: CPT

## 2024-05-28 PROCEDURE — 84484 ASSAY OF TROPONIN QUANT: CPT | Performed by: STUDENT IN AN ORGANIZED HEALTH CARE EDUCATION/TRAINING PROGRAM

## 2024-05-28 PROCEDURE — 84484 ASSAY OF TROPONIN QUANT: CPT | Performed by: EMERGENCY MEDICINE

## 2024-05-28 PROCEDURE — 84146 ASSAY OF PROLACTIN: CPT | Performed by: INTERNAL MEDICINE

## 2024-05-28 RX ORDER — METHYLPREDNISOLONE SODIUM SUCCINATE 125 MG/2ML
125 INJECTION, POWDER, LYOPHILIZED, FOR SOLUTION INTRAMUSCULAR; INTRAVENOUS ONCE
Status: COMPLETED | OUTPATIENT
Start: 2024-05-28 | End: 2024-05-28

## 2024-05-28 RX ORDER — IOPAMIDOL 755 MG/ML
44 INJECTION, SOLUTION INTRAVASCULAR ONCE
Status: COMPLETED | OUTPATIENT
Start: 2024-05-28 | End: 2024-05-28

## 2024-05-28 RX ORDER — DIPHENHYDRAMINE HYDROCHLORIDE 50 MG/ML
25 INJECTION INTRAMUSCULAR; INTRAVENOUS EVERY 6 HOURS PRN
Status: DISCONTINUED | OUTPATIENT
Start: 2024-05-28 | End: 2024-05-29

## 2024-05-28 RX ORDER — VANCOMYCIN HYDROCHLORIDE 1 G/200ML
1000 INJECTION, SOLUTION INTRAVENOUS ONCE
Status: COMPLETED | OUTPATIENT
Start: 2024-05-28 | End: 2024-05-29

## 2024-05-28 RX ORDER — CEFEPIME HYDROCHLORIDE 2 G/1
2 INJECTION, POWDER, FOR SOLUTION INTRAVENOUS ONCE
Status: COMPLETED | OUTPATIENT
Start: 2024-05-28 | End: 2024-05-28

## 2024-05-28 RX ORDER — ACETAMINOPHEN 325 MG/1
650 TABLET ORAL ONCE
Status: COMPLETED | OUTPATIENT
Start: 2024-05-28 | End: 2024-05-28

## 2024-05-28 RX ADMIN — DIPHENHYDRAMINE HYDROCHLORIDE 25 MG: 50 INJECTION INTRAMUSCULAR; INTRAVENOUS at 22:49

## 2024-05-28 RX ADMIN — CEFEPIME HYDROCHLORIDE 2 G: 2 INJECTION, POWDER, FOR SOLUTION INTRAVENOUS at 22:07

## 2024-05-28 RX ADMIN — SODIUM CHLORIDE 1000 ML: 9 INJECTION, SOLUTION INTRAVENOUS at 22:12

## 2024-05-28 RX ADMIN — ACETAMINOPHEN 650 MG: 325 TABLET ORAL at 21:31

## 2024-05-28 RX ADMIN — METHYLPREDNISOLONE SODIUM SUCCINATE 125 MG: 125 INJECTION, POWDER, FOR SOLUTION INTRAMUSCULAR; INTRAVENOUS at 21:36

## 2024-05-28 RX ADMIN — IOPAMIDOL 44 ML: 755 INJECTION, SOLUTION INTRAVENOUS at 23:11

## 2024-05-28 RX ADMIN — VANCOMYCIN HYDROCHLORIDE 1000 MG: 1 INJECTION, SOLUTION INTRAVENOUS at 23:50

## 2024-05-28 RX ADMIN — SODIUM CHLORIDE 1000 ML: 9 INJECTION, SOLUTION INTRAVENOUS at 21:32

## 2024-05-28 ASSESSMENT — ACTIVITIES OF DAILY LIVING (ADL)
ADLS_ACUITY_SCORE: 38
ADLS_ACUITY_SCORE: 38

## 2024-05-28 ASSESSMENT — COLUMBIA-SUICIDE SEVERITY RATING SCALE - C-SSRS
2. HAVE YOU ACTUALLY HAD ANY THOUGHTS OF KILLING YOURSELF IN THE PAST MONTH?: NO
1. IN THE PAST MONTH, HAVE YOU WISHED YOU WERE DEAD OR WISHED YOU COULD GO TO SLEEP AND NOT WAKE UP?: NO
6. HAVE YOU EVER DONE ANYTHING, STARTED TO DO ANYTHING, OR PREPARED TO DO ANYTHING TO END YOUR LIFE?: NO

## 2024-05-29 PROBLEM — A41.9 SEPSIS (H): Status: ACTIVE | Noted: 2024-05-29

## 2024-05-29 PROBLEM — J18.9 MULTIFOCAL PNEUMONIA: Status: ACTIVE | Noted: 2024-05-29

## 2024-05-29 PROBLEM — N39.0 ACUTE UTI: Status: ACTIVE | Noted: 2024-05-29

## 2024-05-29 LAB
ALBUMIN SERPL BCG-MCNC: 3.1 G/DL (ref 3.5–5.2)
ALP SERPL-CCNC: 123 U/L (ref 40–150)
ALT SERPL W P-5'-P-CCNC: 23 U/L (ref 0–50)
ANION GAP SERPL CALCULATED.3IONS-SCNC: 12 MMOL/L (ref 7–15)
AST SERPL W P-5'-P-CCNC: 17 U/L (ref 0–45)
BASOPHILS # BLD AUTO: 0 10E3/UL (ref 0–0.2)
BASOPHILS NFR BLD AUTO: 0 %
BILIRUB SERPL-MCNC: 0.2 MG/DL
BUN SERPL-MCNC: 11.4 MG/DL (ref 8–23)
CALCIUM SERPL-MCNC: 8.5 MG/DL (ref 8.8–10.2)
CHLORIDE SERPL-SCNC: 110 MMOL/L (ref 98–107)
CORTIS SERPL-MCNC: 44.2 UG/DL
CREAT SERPL-MCNC: 0.48 MG/DL (ref 0.51–0.95)
DEPRECATED HCO3 PLAS-SCNC: 16 MMOL/L (ref 22–29)
EGFRCR SERPLBLD CKD-EPI 2021: >90 ML/MIN/1.73M2
EOSINOPHIL # BLD AUTO: 0 10E3/UL (ref 0–0.7)
EOSINOPHIL NFR BLD AUTO: 0 %
ERYTHROCYTE [DISTWIDTH] IN BLOOD BY AUTOMATED COUNT: 16.6 % (ref 10–15)
GLUCOSE SERPL-MCNC: 172 MG/DL (ref 70–99)
HCT VFR BLD AUTO: 34 % (ref 35–47)
HGB BLD-MCNC: 10.4 G/DL (ref 11.7–15.7)
IMM GRANULOCYTES # BLD: 0.2 10E3/UL
IMM GRANULOCYTES NFR BLD: 1 %
L PNEUMO1 AG UR QL IA: NEGATIVE
LYMPHOCYTES # BLD AUTO: 1.1 10E3/UL (ref 0.8–5.3)
LYMPHOCYTES NFR BLD AUTO: 7 %
MCH RBC QN AUTO: 27.7 PG (ref 26.5–33)
MCHC RBC AUTO-ENTMCNC: 30.6 G/DL (ref 31.5–36.5)
MCV RBC AUTO: 90 FL (ref 78–100)
MONOCYTES # BLD AUTO: 0.5 10E3/UL (ref 0–1.3)
MONOCYTES NFR BLD AUTO: 3 %
MRSA DNA SPEC QL NAA+PROBE: POSITIVE
NEUTROPHILS # BLD AUTO: 14.8 10E3/UL (ref 1.6–8.3)
NEUTROPHILS NFR BLD AUTO: 89 %
NRBC # BLD AUTO: 0 10E3/UL
NRBC BLD AUTO-RTO: 0 /100
PLATELET # BLD AUTO: 215 10E3/UL (ref 150–450)
POTASSIUM SERPL-SCNC: 4.4 MMOL/L (ref 3.4–5.3)
PROLACTIN SERPL 3RD IS-MCNC: 7 NG/ML (ref 5–23)
PROT SERPL-MCNC: 6.8 G/DL (ref 6.4–8.3)
RBC # BLD AUTO: 3.76 10E6/UL (ref 3.8–5.2)
S PNEUM AG SPEC QL: NEGATIVE
SA TARGET DNA: POSITIVE
SODIUM SERPL-SCNC: 138 MMOL/L (ref 135–145)
TSH SERPL DL<=0.005 MIU/L-ACNC: 0.46 UIU/ML (ref 0.3–4.2)
WBC # BLD AUTO: 16.5 10E3/UL (ref 4–11)

## 2024-05-29 PROCEDURE — 258N000003 HC RX IP 258 OP 636: Performed by: FAMILY MEDICINE

## 2024-05-29 PROCEDURE — 80201 ASSAY OF TOPIRAMATE: CPT | Performed by: INTERNAL MEDICINE

## 2024-05-29 PROCEDURE — 80053 COMPREHEN METABOLIC PANEL: CPT | Performed by: INTERNAL MEDICINE

## 2024-05-29 PROCEDURE — 258N000003 HC RX IP 258 OP 636: Performed by: INTERNAL MEDICINE

## 2024-05-29 PROCEDURE — 120N000004 HC R&B MS OVERFLOW

## 2024-05-29 PROCEDURE — 82024 ASSAY OF ACTH: CPT | Performed by: INTERNAL MEDICINE

## 2024-05-29 PROCEDURE — 250N000011 HC RX IP 250 OP 636: Performed by: FAMILY MEDICINE

## 2024-05-29 PROCEDURE — 96367 TX/PROPH/DG ADDL SEQ IV INF: CPT

## 2024-05-29 PROCEDURE — 87899 AGENT NOS ASSAY W/OPTIC: CPT | Performed by: INTERNAL MEDICINE

## 2024-05-29 PROCEDURE — 85025 COMPLETE CBC W/AUTO DIFF WBC: CPT | Performed by: INTERNAL MEDICINE

## 2024-05-29 PROCEDURE — 99418 PROLNG IP/OBS E/M EA 15 MIN: CPT | Performed by: INTERNAL MEDICINE

## 2024-05-29 PROCEDURE — 96361 HYDRATE IV INFUSION ADD-ON: CPT

## 2024-05-29 PROCEDURE — 99207 PR APP CREDIT; MD BILLING SHARED VISIT: CPT | Performed by: FAMILY MEDICINE

## 2024-05-29 PROCEDURE — 250N000013 HC RX MED GY IP 250 OP 250 PS 637: Performed by: FAMILY MEDICINE

## 2024-05-29 PROCEDURE — 250N000013 HC RX MED GY IP 250 OP 250 PS 637: Performed by: INTERNAL MEDICINE

## 2024-05-29 PROCEDURE — 36415 COLL VENOUS BLD VENIPUNCTURE: CPT | Performed by: INTERNAL MEDICINE

## 2024-05-29 PROCEDURE — 250N000011 HC RX IP 250 OP 636: Performed by: INTERNAL MEDICINE

## 2024-05-29 PROCEDURE — 87641 MR-STAPH DNA AMP PROBE: CPT | Performed by: FAMILY MEDICINE

## 2024-05-29 PROCEDURE — 87640 STAPH A DNA AMP PROBE: CPT | Performed by: FAMILY MEDICINE

## 2024-05-29 PROCEDURE — 99223 1ST HOSP IP/OBS HIGH 75: CPT | Performed by: INTERNAL MEDICINE

## 2024-05-29 RX ORDER — NALOXONE HYDROCHLORIDE 0.4 MG/ML
0.4 INJECTION, SOLUTION INTRAMUSCULAR; INTRAVENOUS; SUBCUTANEOUS
Status: DISCONTINUED | OUTPATIENT
Start: 2024-05-29 | End: 2024-06-03 | Stop reason: HOSPADM

## 2024-05-29 RX ORDER — SODIUM CHLORIDE, SODIUM LACTATE, POTASSIUM CHLORIDE, CALCIUM CHLORIDE 600; 310; 30; 20 MG/100ML; MG/100ML; MG/100ML; MG/100ML
INJECTION, SOLUTION INTRAVENOUS CONTINUOUS
Status: DISCONTINUED | OUTPATIENT
Start: 2024-05-29 | End: 2024-05-31

## 2024-05-29 RX ORDER — NALOXONE HYDROCHLORIDE 0.4 MG/ML
0.2 INJECTION, SOLUTION INTRAMUSCULAR; INTRAVENOUS; SUBCUTANEOUS
Status: DISCONTINUED | OUTPATIENT
Start: 2024-05-29 | End: 2024-06-03 | Stop reason: HOSPADM

## 2024-05-29 RX ORDER — MEROPENEM 1 G/1
1 INJECTION, POWDER, FOR SOLUTION INTRAVENOUS EVERY 8 HOURS
Status: DISCONTINUED | OUTPATIENT
Start: 2024-05-29 | End: 2024-05-29

## 2024-05-29 RX ORDER — OXCARBAZEPINE 150 MG/1
450 TABLET, FILM COATED ORAL AT BEDTIME
Status: DISCONTINUED | OUTPATIENT
Start: 2024-05-29 | End: 2024-06-03 | Stop reason: HOSPADM

## 2024-05-29 RX ORDER — CALCIUM CARBONATE 500 MG/1
1000 TABLET, CHEWABLE ORAL 4 TIMES DAILY PRN
Status: DISCONTINUED | OUTPATIENT
Start: 2024-05-29 | End: 2024-06-03 | Stop reason: HOSPADM

## 2024-05-29 RX ORDER — CEFAZOLIN SODIUM 1 G/50ML
750 SOLUTION INTRAVENOUS EVERY 12 HOURS
Status: COMPLETED | OUTPATIENT
Start: 2024-05-29 | End: 2024-05-31

## 2024-05-29 RX ORDER — BUDESONIDE 3 MG/1
9 CAPSULE, COATED PELLETS ORAL EVERY MORNING
Status: DISCONTINUED | OUTPATIENT
Start: 2024-05-29 | End: 2024-06-03 | Stop reason: HOSPADM

## 2024-05-29 RX ORDER — AMOXICILLIN 250 MG
2 CAPSULE ORAL 2 TIMES DAILY PRN
Status: DISCONTINUED | OUTPATIENT
Start: 2024-05-29 | End: 2024-06-03 | Stop reason: HOSPADM

## 2024-05-29 RX ORDER — GABAPENTIN 100 MG/1
100 CAPSULE ORAL DAILY
Status: DISCONTINUED | OUTPATIENT
Start: 2024-05-29 | End: 2024-06-03 | Stop reason: HOSPADM

## 2024-05-29 RX ORDER — GABAPENTIN 300 MG/1
300 CAPSULE ORAL AT BEDTIME
Status: DISCONTINUED | OUTPATIENT
Start: 2024-05-29 | End: 2024-06-03 | Stop reason: HOSPADM

## 2024-05-29 RX ORDER — ONDANSETRON 4 MG/1
4 TABLET, ORALLY DISINTEGRATING ORAL EVERY 6 HOURS PRN
Status: DISCONTINUED | OUTPATIENT
Start: 2024-05-29 | End: 2024-06-03 | Stop reason: HOSPADM

## 2024-05-29 RX ORDER — LIDOCAINE 40 MG/G
CREAM TOPICAL
Status: DISCONTINUED | OUTPATIENT
Start: 2024-05-29 | End: 2024-06-03 | Stop reason: HOSPADM

## 2024-05-29 RX ORDER — ONDANSETRON 2 MG/ML
4 INJECTION INTRAMUSCULAR; INTRAVENOUS EVERY 6 HOURS PRN
Status: DISCONTINUED | OUTPATIENT
Start: 2024-05-29 | End: 2024-06-03 | Stop reason: HOSPADM

## 2024-05-29 RX ORDER — VANCOMYCIN HYDROCHLORIDE 500 MG/10ML
500 INJECTION, POWDER, LYOPHILIZED, FOR SOLUTION INTRAVENOUS EVERY 12 HOURS
Status: DISCONTINUED | OUTPATIENT
Start: 2024-05-29 | End: 2024-05-29

## 2024-05-29 RX ORDER — NORTRIPTYLINE HYDROCHLORIDE 50 MG/1
50 CAPSULE ORAL AT BEDTIME
Status: DISCONTINUED | OUTPATIENT
Start: 2024-05-29 | End: 2024-06-03 | Stop reason: HOSPADM

## 2024-05-29 RX ORDER — ACETAMINOPHEN 325 MG/1
325 TABLET ORAL EVERY 4 HOURS PRN
Status: DISCONTINUED | OUTPATIENT
Start: 2024-05-29 | End: 2024-06-03 | Stop reason: HOSPADM

## 2024-05-29 RX ORDER — MIRTAZAPINE 7.5 MG/1
15 TABLET, FILM COATED ORAL AT BEDTIME
Status: DISCONTINUED | OUTPATIENT
Start: 2024-05-29 | End: 2024-06-03 | Stop reason: HOSPADM

## 2024-05-29 RX ORDER — LEVOFLOXACIN 500 MG/1
500 TABLET, FILM COATED ORAL DAILY
Status: DISCONTINUED | OUTPATIENT
Start: 2024-05-29 | End: 2024-06-03 | Stop reason: HOSPADM

## 2024-05-29 RX ORDER — ENOXAPARIN SODIUM 100 MG/ML
30 INJECTION SUBCUTANEOUS EVERY 24 HOURS
Status: DISCONTINUED | OUTPATIENT
Start: 2024-05-29 | End: 2024-06-03 | Stop reason: HOSPADM

## 2024-05-29 RX ORDER — IPRATROPIUM BROMIDE AND ALBUTEROL SULFATE 2.5; .5 MG/3ML; MG/3ML
3 SOLUTION RESPIRATORY (INHALATION) EVERY 4 HOURS PRN
Status: DISCONTINUED | OUTPATIENT
Start: 2024-05-29 | End: 2024-06-03 | Stop reason: HOSPADM

## 2024-05-29 RX ORDER — ACETAMINOPHEN AND CODEINE PHOSPHATE 300; 30 MG/1; MG/1
1 TABLET ORAL EVERY 6 HOURS PRN
Status: DISCONTINUED | OUTPATIENT
Start: 2024-05-29 | End: 2024-05-31

## 2024-05-29 RX ORDER — CEFEPIME HYDROCHLORIDE 1 G/1
1 INJECTION, POWDER, FOR SOLUTION INTRAMUSCULAR; INTRAVENOUS EVERY 12 HOURS
Status: DISCONTINUED | OUTPATIENT
Start: 2024-05-29 | End: 2024-05-29

## 2024-05-29 RX ORDER — AMOXICILLIN 250 MG
1 CAPSULE ORAL 2 TIMES DAILY PRN
Status: DISCONTINUED | OUTPATIENT
Start: 2024-05-29 | End: 2024-06-03 | Stop reason: HOSPADM

## 2024-05-29 RX ADMIN — LEVETIRACETAM 750 MG: 250 TABLET, FILM COATED ORAL at 19:48

## 2024-05-29 RX ADMIN — LEVETIRACETAM 750 MG: 250 TABLET, FILM COATED ORAL at 09:02

## 2024-05-29 RX ADMIN — SODIUM CHLORIDE, POTASSIUM CHLORIDE, SODIUM LACTATE AND CALCIUM CHLORIDE: 600; 310; 30; 20 INJECTION, SOLUTION INTRAVENOUS at 10:39

## 2024-05-29 RX ADMIN — OXCARBAZEPINE 450 MG: 150 TABLET, FILM COATED ORAL at 21:21

## 2024-05-29 RX ADMIN — GABAPENTIN 300 MG: 300 CAPSULE ORAL at 04:37

## 2024-05-29 RX ADMIN — GABAPENTIN 300 MG: 300 CAPSULE ORAL at 21:21

## 2024-05-29 RX ADMIN — SODIUM CHLORIDE, POTASSIUM CHLORIDE, SODIUM LACTATE AND CALCIUM CHLORIDE: 600; 310; 30; 20 INJECTION, SOLUTION INTRAVENOUS at 02:54

## 2024-05-29 RX ADMIN — BUDESONIDE 9 MG: 3 CAPSULE, GELATIN COATED ORAL at 09:04

## 2024-05-29 RX ADMIN — SODIUM CHLORIDE 750 MG: 9 INJECTION, SOLUTION INTRAVENOUS at 17:44

## 2024-05-29 RX ADMIN — PSYLLIUM HUSK 1 PACKET: 3.4 POWDER ORAL at 09:04

## 2024-05-29 RX ADMIN — FAMOTIDINE 20 MG: 10 INJECTION, SOLUTION INTRAVENOUS at 03:31

## 2024-05-29 RX ADMIN — SODIUM CHLORIDE, POTASSIUM CHLORIDE, SODIUM LACTATE AND CALCIUM CHLORIDE 500 ML: 600; 310; 30; 20 INJECTION, SOLUTION INTRAVENOUS at 03:18

## 2024-05-29 RX ADMIN — ACETAMINOPHEN 325 MG: 325 TABLET ORAL at 15:05

## 2024-05-29 RX ADMIN — MEROPENEM 1 G: 1 INJECTION, POWDER, FOR SOLUTION INTRAVENOUS at 09:02

## 2024-05-29 RX ADMIN — NORTRIPTYLINE HYDROCHLORIDE 50 MG: 50 CAPSULE ORAL at 21:19

## 2024-05-29 RX ADMIN — MIRTAZAPINE 15 MG: 7.5 TABLET, FILM COATED ORAL at 21:21

## 2024-05-29 RX ADMIN — GABAPENTIN 100 MG: 100 CAPSULE ORAL at 09:03

## 2024-05-29 RX ADMIN — ACETAMINOPHEN AND CODEINE PHOSPHATE 1 TABLET: 300; 30 TABLET ORAL at 04:37

## 2024-05-29 RX ADMIN — SENNOSIDES AND DOCUSATE SODIUM 2 TABLET: 8.6; 5 TABLET ORAL at 17:44

## 2024-05-29 RX ADMIN — LEVOFLOXACIN 500 MG: 500 TABLET, FILM COATED ORAL at 15:05

## 2024-05-29 RX ADMIN — OXCARBAZEPINE 450 MG: 150 TABLET, FILM COATED ORAL at 05:20

## 2024-05-29 RX ADMIN — AZITHROMYCIN MONOHYDRATE 500 MG: 500 INJECTION, POWDER, LYOPHILIZED, FOR SOLUTION INTRAVENOUS at 01:30

## 2024-05-29 RX ADMIN — ACETAMINOPHEN AND CODEINE PHOSPHATE 1 TABLET: 300; 30 TABLET ORAL at 16:39

## 2024-05-29 RX ADMIN — ENOXAPARIN SODIUM 30 MG: 30 INJECTION SUBCUTANEOUS at 19:47

## 2024-05-29 RX ADMIN — NORTRIPTYLINE HYDROCHLORIDE 50 MG: 50 CAPSULE ORAL at 05:20

## 2024-05-29 RX ADMIN — SODIUM CHLORIDE, POTASSIUM CHLORIDE, SODIUM LACTATE AND CALCIUM CHLORIDE: 600; 310; 30; 20 INJECTION, SOLUTION INTRAVENOUS at 22:57

## 2024-05-29 ASSESSMENT — ACTIVITIES OF DAILY LIVING (ADL)
ADLS_ACUITY_SCORE: 40
ADLS_ACUITY_SCORE: 45
ADLS_ACUITY_SCORE: 45
ADLS_ACUITY_SCORE: 50
ADLS_ACUITY_SCORE: 50
ADLS_ACUITY_SCORE: 40
ADLS_ACUITY_SCORE: 50
ADLS_ACUITY_SCORE: 38
ADLS_ACUITY_SCORE: 40
ADLS_ACUITY_SCORE: 40
ADLS_ACUITY_SCORE: 50
ADLS_ACUITY_SCORE: 38
ADLS_ACUITY_SCORE: 50
ADLS_ACUITY_SCORE: 45
DEPENDENT_IADLS:: CLEANING;TRANSPORTATION;COOKING;LAUNDRY;SHOPPING;MEAL PREPARATION;MEDICATION MANAGEMENT
ADLS_ACUITY_SCORE: 45
ADLS_ACUITY_SCORE: 45
ADLS_ACUITY_SCORE: 38
ADLS_ACUITY_SCORE: 40
ADLS_ACUITY_SCORE: 50
ADLS_ACUITY_SCORE: 45
ADLS_ACUITY_SCORE: 40

## 2024-05-29 NOTE — PHARMACY-ADMISSION MEDICATION HISTORY
Pharmacy Vancomycin Initial Note  Date of Service May 28, 2024  Patient's  1945  79 year old, female    Indication: Sepsis    Current estimated CrCl = Estimated Creatinine Clearance: 50.7 mL/min (based on SCr of 0.58 mg/dL).    Creatinine for last 3 days  2024:  7:20 PM Creatinine 0.58 mg/dL    Recent Vancomycin Level(s) for last 3 days  No results found for requested labs within last 3 days.      Vancomycin IV Administrations (past 72 hours)        No vancomycin orders with administrations in past 72 hours.                    Nephrotoxins and other renal medications (From now, onward)      None            Contrast Orders - past 72 hours (72h ago, onward)      None            Plan:  Start vancomycin 1000 mg IV x1.  Please re-consult pharmacy if therapy to continue upon admission.    Ashwini Braga, [facility]

## 2024-05-29 NOTE — PROGRESS NOTES
[facility]    Medicine Progress Note - Hospitalist Service    Date of Admission:  5/28/2024    Assessment & Plan   Julisa Chaney is a 79 year old female with a medical history significant for collagenous colitis, seizure disorder, hyperlipidemia, chronic pain syndrome, GERD, trigeminal neuralgia and other medical co morbidities who was admitted with sepsis due to multifocal pneumonia and pyelonephritis/UTI.       Sepsis-due possible  and/or respiratory infection.  Patient covered broadly on cefepime, vancomycin and azithromycin then meropenem.  Patient with history of MRSA and is + on follow up testing.  Switched to Vanco Levaquin due what looks like a drug rash overnight.  Follow-up on blood and urine cultures.  Gentle hydration.  She is most suggestive of aspiration pneumonia.    Bilateral multifocal pneumonia-community-acquired pneumonia versus aspiration pneumonia.  Patient currently on levaquin and vanco due to drug rash with original antibiotics.  Optimize lung cares with breathing treatments    UTI versus pyelonephritis - continue current abx    Gastritis/GERD.  -Pepcid twice daily    Constipation -MiraLAX daily.    Collagenous colitis-noted patient's is always between diarrhea and constipation.    Trigeminal neuralgia-continue Neurontin    ?  COPD-optimize lung cares with breathing treatments    Failure to thrive- progressive weakness, falls, forgetfulness, low blood pressure, muscle weakness. Elevated cortisol, normal TSH. MRI from 7/30/2022 with ventriculitis and focal abscesses.  Holding on recheck MRI of the brain due to patient reporting that she recently had one.  Nothing avaiable on Care everywhere though. Patient reports about 20 to 30 pound weight loss,    Chronic pain syndrome-pain control as needed    Weakness-PT OT and  before discharge    Rest of patient's medical problems management remains unchanged       Diet:  Clear liquids  DVT Prophylaxis:  "Pneumatic Compression Devices  Abrams Catheter: Not present  Lines: None     Cardiac Monitoring: Yes  Code Status:  Full code          Diet: Clear Liquid Diet  Snacks/Supplements Adult: Ensure Clear; With Meals  Snacks/Supplements Adult: Gelatein Plus; With Meals    DVT Prophylaxis: Enoxaparin (Lovenox) SQ  Abrams Catheter: Not present  Lines: None     Cardiac Monitoring: ACTIVE order. Indication: Tachyarrhythmias, acute (48 hours)  Code Status: Full Code      Clinically Significant Risk Factors Present on Admission           # Hypercalcemia: corrected calcium is >10.1, will monitor as appropriate    # Hypoalbuminemia: Lowest albumin = 3.1 g/dL at 5/29/2024  8:45 AM, will monitor as appropriate          # Cachexia: Estimated body mass index is 17.17 kg/m  as calculated from the following:    Height as of this encounter: 1.651 m (5' 5\").    Weight as of this encounter: 46.8 kg (103 lb 3.2 oz).    # Moderate Malnutrition: based on nutrition assessment     # Financial/Environmental Concerns: none         Disposition Plan     Medically Ready for Discharge: Anticipated in 2-4 Days             Tim Low MD  Hospitalist Service  [facility]  Securely message with [employer] (more info)  Text page via [employer] Paging/Directory   ______________________________________________________________________    Interval History   Patient has diffuse rash on back, chest and legs since arrival.  No other complaints.  No cough.  Still some SOB.    Physical Exam   Vital Signs: Temp: 96.9  F (36.1  C) Temp src: Axillary BP: 90/47 Pulse: 52   Resp: 18 SpO2: 96 % O2 Device: None (Room air) Oxygen Delivery: 2 LPM  Weight: 103 lbs 3.2 oz    Constitutional: pale and cachectic  Respiratory: decreased air exchange/ronchi  Cardiovascular: normal S1 and S2  GI: normal bowel sounds  Neurologic: Moves all extremities, Face symmetrical    Medical Decision Making       45 MINUTES SPENT BY ME on the date of service doing chart " review, history, exam, documentation & further activities per the note.      Data     I have personally reviewed the following data over the past 24 hrs:    16.5 (H)  \   10.4 (L)   / 215     138 110 (H) 11.4 /  172 (H)   4.4 16 (L) 0.48 (L) \     ALT: 23 AST: 17 AP: 123 TBILI: 0.2   ALB: 3.1 (L) TOT PROTEIN: 6.8 LIPASE: N/A     Trop: 17 (H) BNP: N/A     TSH: 0.46 T4: N/A A1C: N/A     Procal: N/A CRP: N/A Lactic Acid: 1.2         Imaging results reviewed over the past 24 hrs:   Recent Results (from the past 24 hour(s))   CT Chest/Abdomen/Pelvis w Contrast    Addendum: 5/29/2024    ADDENDUM:  This addendum is issued to highlight findings of subtle wedge-shaped hypoattenuations in the right lower pole kidney mentioned in the body of report. These are indeterminate but may represent mild pyelonephritis. Recommend correlation with urinalysis.    This addendum is relayed to patien't nurse Darcy Hickey at 12:05 AM on 5/29/2024.    END ADDENDUM      Narrative    EXAM: CT CHEST/ABDOMEN/PELVIS W CONTRAST  LOCATION: [facility]  DATE: 5/28/2024    INDICATION: Generalised weakness, leukocytosis, tender abdomen on exam  COMPARISON: 1/17/2024  TECHNIQUE: CT scan of the chest, abdomen, and pelvis was performed following injection of IV contrast. Multiplanar reformats were obtained. Dose reduction techniques were used.   CONTRAST: 44 ml Isovue 370    FINDINGS:   LUNGS AND PLEURA: Multiple patchy opacities and consolidation seen in the right lung with largest consolidation in the right lower lobe. Scattered tree-in-bud nodules also seen in the superior segment of the left lower lobe and lingula, compatible with   an infectious etiology as well. No pleural effusion or pneumothorax. An 8 mm nodule is seen in the superior segment of the right lower lobe on series 4 image 121. A 4 mm nodule in the right upper lobe on series 4 image 77. Mild emphysema is present.    MEDIASTINUM/AXILLAE: A mildly enlarged right  paratracheal lymph node measures 2 x 1.4 cm, likely reactive. No axillary or supraclavicular lymphadenopathy.    CORONARY ARTERY CALCIFICATION: Moderate three-vessel coronary artery atherosclerotic calcifications.    HEPATOBILIARY: Liver is normal. Mild intrahepatic biliary ductal dilatation is indeterminate. Common bile duct is normal in caliber. Gallbladder is diffusely thick-walled but fairly decompressed. This finding is similar to prior study.    PANCREAS: Diffuse atrophy without pancreatic ductal dilatation or peripancreatic fluid collections.    SPLEEN: Normal.    ADRENAL GLANDS: Normal.    KIDNEYS/BLADDER: Small areas of wedge-shaped hypoattenuation in the lower pole of the right kidney (series 3 image 185). No urinary stones or hydroureteronephrosis. Urinary bladder is mildly distended but normal.    BOWEL: Mild wall thickening of the relatively decompressed gastric antrum, indeterminate. Remainder of the gastrointestinal tract including the appendix is normal. Moderate amount of stool seen in the colon.    LYMPH NODES: Normal.    VASCULATURE: Advanced aortoiliac atherosclerotic calcifications. No abdominal aortic aneurysm.    PELVIC ORGANS: Status post hysterectomy. No abnormal adnexal masses.    MUSCULOSKELETAL: Moderate degenerative disc space narrowing from L4 to S1. Mild degenerative height loss of T12. Mild levoscoliosis of the spine. No suspicious osseous lesions or acute fractures.        Impression    IMPRESSION:    1.  Multifocal pneumonia, largest and most consolidative in the right lower lobe.    2.  Mild emphysema with indeterminate right pulmonary nodules measuring up to 8 mm in the superior segment of the right lower lobe. Recommend follow-up chest CT in 3-6 months then at 18-24 months per Fleischner Society 2017 guideline.    3.  Mild wall thickening of the gastric antrum, either representing mild gastritis versus artifact of under distention.    4.  Diffuse wall thickening of a relatively  decompressed gallbladder, unchanged from prior study, possibly due to adenomyomatosis.    5.  Moderate colonic stool burden.

## 2024-05-29 NOTE — PROGRESS NOTES
Patient arrived at 1500.  Patient reported 7/10 pain at her left trigeminal nerve.  HR 54, BP 90/47 map 65, 96% O2 on RA.  Skin at chest and back are red, provider discontinued IV ABX.  Patient reports her skin does not hurt, burn, or itch.      Geoff Car RN

## 2024-05-29 NOTE — ED PROVIDER NOTES
Emergency Department Midlevel Supervisory Note     I had a face to face encounter with this patient seen by the Advanced Practice Provider (GINGER). I personally made/approved the management plan and take responsibility for the patient management. I personally saw patient and performed a substantive portion of the visit including all aspects of the medical decision making.     ED Course:  9:11 PM Shaniqua White PA-C staffed patient with me. I agree with their assessment and plan of management, and I will see the patient.  9:34 PM I met with the patient to introduce myself, gather additional history, perform my initial exam, and discuss the plan.     Brief HPI:     Julisa Chaney is a 79 year old female who presents for evaluation of generalized weakness.     Today, the patient has not had anything to eat or drink. She also has not taken her medications today. At 1800, the patient's daughter visited the patient to make dinner and give her medications. The patient had generalized weakness, tremors, facial pain, fever, headache and mid abdominal pain so her daughter sent her to the ED. Additionally, the patient had not had a bowel movement in over a week. She also reports decreased appetite for the past few days and endorses some pain with deep breaths. Patient states she always has neck pain and currently endorses some headache in the back of the head. Patient is allergic to penicillin.      Denies nausea, vomiting, diarrhea, chest pain, shortness of breath, cough, rhinorrhea, dysuria, hematuria, bloody stool, blood thinners, trauma, history of abdominal surgery, or any other complaints at this time.       I, Trinidad Abdi, am serving as a scribe to document services personally performed by  Tod Pettit MD, based on my observations and the provider's statements to me. I,  Tod Pettit MD attest that Trinidad Abdi was acting in a scribe capacity, has observed my performance of the services and has  "documented them in accordance with my direction.    Brief Physical Exam: /59   Pulse 109   Temp (!) 102.5  F (39.2  C) (Oral)   Resp 28   Ht 1.651 m (5' 5\")   Wt 40.8 kg (90 lb)   SpO2 91%   BMI 14.98 kg/m    Constitutional:  Alert, cachectic, awake and able answer questions but appears frail  EYES: Conjunctivae clear  HENT:  Atraumatic  Respiratory:  Respirations even, tachypneic without respiratory distress, trace mild crackles  Cardiovascular: Regular tachycardic pulse.  Vasoconstricted cool periphery  GI: Soft, non-distended, non-tender  Musculoskeletal:  Moves all 4 extremities equally, grossly symmetrical strength  Integument: Extremities cool and vasoconstricted.  Neurologic:  Alert & oriented, speech clear and fluent, no focal deficits noted  Psych: Normal mood and affect       MDM:  79-year-old with history of trigeminal neuralgia, malnutrition and failure to thrive presenting for evaluation of generalized weakness over the past few days.  Arrives here tachycardic and febrile, developed some hypoxia at rest.  Chart review shows a history of aspiration pneumonia as well as colitis.  Preliminary exam suggestive of sepsis with most likely source from the pulmonary system.  Does have reported history of rash possibly associated with IV CT contrast in the past however has also received IV contrast after this without significant reaction.  I have a low threshold for believing this is a true IV contrast allergy.  Will proceed with accelerated 1 hour protocol for prophylaxis out of an abundance of caution.  Starting treatment for sepsis with ceftriaxone and vancomycin.  Urine and blood cultures pending.  Workup identified pneumonia as well as probable UTI.  Antibiotics adequate for initial coverage.  Patient not septic care requiring vasopressor support at this time.  Admitted to hospitalist for further management and care.    Medical Decision Making  Obtained supplemental history:Supplemental history " obtained?: Documented in chart  Reviewed external records: External records reviewed?: Documented in chart and Outpatient Record: Patient had an office visit at [facility] on 4/16/24.   Care impacted by chronic illness:Other: sepsis, migraine, depression, high cholesterol, and HLD   Care significantly affected by social determinants of health:N/A  Did you consider but not order tests?: Work up considered but not performed and documented in chart, if applicable  Did you interpret images independently?: Independent interpretation of ECG and images noted in documentation, when applicable.  Consultation discussion with other provider:Did you involve another provider (consultant, , pharmacy, etc.)?: No  Admit.    Critical Care Addendum    My initial assessment, based on my review of nursing observations, review of vital signs, focused history, physical exam, and interpretation of CT , established that Julisa Chaney has sepsis with indication for early goal-directed therapy and respiratory distress, which requires immediate intervention, and therefore She is critically ill.     After the initial assessment, the care team initiated multiple lab tests, initiated IV fluid administration, and initiated medication therapy with antibiotics and oxygen  to provide stabilization care. Due to the critical nature of this patient, I reassessed nursing observations, vital signs, physical exam, mental status, neurologic status, and respiratory status multiple times prior to She disposition.     Time also spent performing documentation, reviewing test results, and coordination of care.     Critical care time (excluding teaching time and procedures): 30 minutes.         ED Course as of 05/29/24 0533   Tue May 28, 2024   2050 The patient is a 79-year-old female with a history of collagenous colitis presenting to the emergency department for evaluation of feeling generally weak with subjective fever and chills also onset  this evening.  No abdominal pain, nausea, vomiting, diarrhea, blood in stool, urinary symptoms, chest pain, shortness of breath, cough.   2051 Initial vital signs reviewed and significant for tachycardia with heart rate 106.  Patient is afebrile.  On exam, patient is cachectic and ill-appearing.  She has epigastric tenderness to palpation, no rebound or guarding.  Lungs are clear to auscultation throughout.  Patient is alert and oriented to self and location as well as to year, is unable to provide the month or who the current president is.   2053 Differential diagnosis includes sepsis, pneumonia,   2053  bowel obstruction, UTI, pyelonephritis.   2115 Per chart review review, patient tolerated contrast media February 2022.  Low risk reaction with isolated rash as reported reaction to contrast media occurring in August 2020.   2124 SpO2(!): 88 %   2124 Oxygen Delivery: 2 LPM   2124 Temp(!): 102.5  F (39.2  C)   Wed May 29, 2024   0048 I discussed the patient with Dr. Glenys gu, hospitalist accepts the patient for admission.  She requests order of azithromycin to cover for potential atypical pneumonia with patient history of aspiration pneumonia.       No diagnosis found.    Consults:  Case discussed with hospitalist service for admission    Labs and Imaging:  Results for orders placed or performed during the hospital encounter of 05/28/24   Basic metabolic panel   Result Value Ref Range    Sodium 134 (L) 135 - 145 mmol/L    Potassium 4.3 3.4 - 5.3 mmol/L    Chloride 99 98 - 107 mmol/L    Carbon Dioxide (CO2) 25 22 - 29 mmol/L    Anion Gap 10 7 - 15 mmol/L    Urea Nitrogen 16.2 8.0 - 23.0 mg/dL    Creatinine 0.58 0.51 - 0.95 mg/dL    GFR Estimate >90 >60 mL/min/1.73m2    Calcium 9.5 8.8 - 10.2 mg/dL    Glucose 149 (H) 70 - 99 mg/dL   Result Value Ref Range    Troponin T, High Sensitivity 20 (H) <=14 ng/L   CBC with platelets and differential   Result Value Ref Range    WBC Count 24.6 (H) 4.0 - 11.0 10e3/uL    RBC  Count 4.92 3.80 - 5.20 10e6/uL    Hemoglobin 13.2 11.7 - 15.7 g/dL    Hematocrit 43.5 35.0 - 47.0 %    MCV 88 78 - 100 fL    MCH 26.8 26.5 - 33.0 pg    MCHC 30.3 (L) 31.5 - 36.5 g/dL    RDW 16.3 (H) 10.0 - 15.0 %    Platelet Count 283 150 - 450 10e3/uL    % Neutrophils 91 %    % Lymphocytes 4 %    % Monocytes 4 %    % Eosinophils 0 %    % Basophils 0 %    % Immature Granulocytes 1 %    NRBCs per 100 WBC 0 <1 /100    Absolute Neutrophils 22.5 (H) 1.6 - 8.3 10e3/uL    Absolute Lymphocytes 0.9 0.8 - 5.3 10e3/uL    Absolute Monocytes 1.0 0.0 - 1.3 10e3/uL    Absolute Eosinophils 0.1 0.0 - 0.7 10e3/uL    Absolute Basophils 0.1 0.0 - 0.2 10e3/uL    Absolute Immature Granulocytes 0.2 <=0.4 10e3/uL    Absolute NRBCs 0.0 10e3/uL   Extra Red Top Tube   Result Value Ref Range    Hold Specimen [facility]    Extra Green Top (Lithium Heparin) ON ICE   Result Value Ref Range    Hold Specimen [facility]    Comprehensive metabolic panel   Result Value Ref Range    Sodium 133 (L) 135 - 145 mmol/L    Potassium 4.2 3.4 - 5.3 mmol/L    Carbon Dioxide (CO2) 22 22 - 29 mmol/L    Anion Gap 11 7 - 15 mmol/L    Urea Nitrogen 15.8 8.0 - 23.0 mg/dL    Creatinine 0.55 0.51 - 0.95 mg/dL    GFR Estimate >90 >60 mL/min/1.73m2    Calcium 9.5 8.8 - 10.2 mg/dL    Chloride 100 98 - 107 mmol/L    Glucose 210 (H) 70 - 99 mg/dL    Alkaline Phosphatase 151 (H) 40 - 150 U/L    AST 19 0 - 45 U/L    ALT 25 0 - 50 U/L    Protein Total 7.6 6.4 - 8.3 g/dL    Albumin 3.7 3.5 - 5.2 g/dL    Bilirubin Total 0.2 <=1.2 mg/dL   Lactic Acid Whole Blood with 1X Repeat in 2 HR when >2   Result Value Ref Range    Lactic Acid, Initial 1.2 0.7 - 2.0 mmol/L   Blood gas venous   Result Value Ref Range    pH Venous 7.38 7.32 - 7.43    pCO2 Venous 39 (L) 40 - 50 mm Hg    pO2 Venous 56 (H) 25 - 47 mm Hg    Bicarbonate Venous 23 21 - 28 mmol/L    Base Excess/Deficit Venous -2.2 -3.0 - 3.0 mmol/L    FIO2 21     Oxyhemoglobin Venous 89 (H) 70 - 75 %    O2 Sat, Venous 89.6 (H) 70.0 - 75.0 %        I have reviewed the relevant laboratory studies above.    I independently interpreted the following imaging study(s):   CT    EKG: I reviewed and independently interpreted the patient's EKG, with comments made as listed below. Please see scanned EKG for full report.   Sinus tachycardia with some nonspecific ST changes    Procedures:  I was present for the key portions of procedures documented in GINGER/midlevel note, see midlevel note for further details.    Tod Pettit MD  [facility] EMERGENCY DEPARTMENT  [address] [place] [id number] [phone]     Perry Baron MD  05/29/24 1859

## 2024-05-29 NOTE — PHARMACY-VANCOMYCIN DOSING SERVICE
Pharmacy Vancomycin Initial Note  Date of Service May 29, 2024  Patient's  1945  79 year old, female    Indication: Aspiration Pneumonia and Community Acquired Pneumonia    Current estimated CrCl = Estimated Creatinine Clearance: 70.2 mL/min (A) (based on SCr of 0.48 mg/dL (L)).    Creatinine for last 3 days  2024:  7:20 PM Creatinine 0.58 mg/dL;  9:03 PM Creatinine 0.55 mg/dL  2024:  8:45 AM Creatinine 0.48 mg/dL    Recent Vancomycin Level(s) for last 3 days  No results found for requested labs within last 3 days.      Vancomycin IV Administrations (past 72 hours)                     vancomycin (VANCOCIN) 1,000 mg in 200 mL dextrose intermittent infusion (mg) 1,000 mg New Bag 24 2350                    Nephrotoxins and other renal medications (From now, onward)      None            Contrast Orders - past 72 hours (72h ago, onward)      Start     Dose/Rate Route Frequency Stop    24 2330  iopamidol (ISOVUE-370) solution 44 mL         44 mL Intravenous ONCE 24 2311            [employer] Prediction of Planned Initial Vancomycin Regimen  Loading dose: N/A  Regimen: 750 mg IV every 12 hours.  Start time: 11:50 on 2024  Exposure target: AUC24 (range)400-600 mg/L.hr   AUC24,ss: 502 mg/L.hr  Probability of AUC24 > 400: 73 %  Ctrough,ss: 15.3 mg/L  Probability of Ctrough,ss > 20: 28 %  Probability of nephrotoxicity (Lodise ESTIVEN ): 11 %          Plan:  Start vancomycin  750 mg IV q12h. Patient received loading dose of 1000 mg IV last night at midnight, original plan was for 500 mg IV q12h following load but will plan for higher dose due to not getting vanco for about 16 hours.   Vancomycin monitoring method: AUC  Vancomycin therapeutic monitoring goal: 400-600 mg*h/L  Pharmacy will check vancomycin levels as appropriate in 1-3 Days.    Serum creatinine levels will be ordered daily for the first week of therapy and at least twice weekly for subsequent weeks.      DAJA DAVILA,  RPH

## 2024-05-29 NOTE — PLAN OF CARE
Patient AOX3-4, forgetful at times. On 2L NC. Denies SOB. Purewick in place. Bladder scan 873, patient voided 500 after scan. Symptoms resolved.   Problem: Pain Acute  Goal: Optimal Pain Control and Function  Outcome: Progressing  Intervention: Develop Pain Management Plan  Recent Flowsheet Documentation  Taken 5/29/2024 0437 by Erika Key, RN  Pain Management Interventions: medication (see MAR)     Problem: Fall Injury Risk  Goal: Absence of Fall and Fall-Related Injury  Outcome: Progressing  Intervention: Promote Injury-Free Environment  Recent Flowsheet Documentation  Taken 5/29/2024 0315 by Erika Key, RN  Safety Promotion/Fall Prevention:   activity supervised   room near nurse's station     Problem: Gas Exchange Impaired  Goal: Optimal Gas Exchange  Outcome: Progressing     Problem: Pneumonia  Goal: Fluid Balance  Outcome: Progressing  Goal: Resolution of Infection Signs and Symptoms  Outcome: Progressing  Goal: Effective Oxygenation and Ventilation  Outcome: Progressing   Goal Outcome Evaluation:

## 2024-05-29 NOTE — ED TRIAGE NOTES
"Patient presents to ER with daughter.  Daughter reports she went there tonight at 6pm to make dinner and set up meds and noticed she had tremors and c/o feeling weak and \"lowsy\".  Endorses chills.     Notes she has not had a BM in about a week or more.      Denies nausea, vomiting, cough, shortness of breath.         "

## 2024-05-29 NOTE — MEDICATION SCRIBE - ADMISSION MEDICATION HISTORY
Medication Scribe Admission Medication History    Admission medication history is complete. The information provided in this note is only as accurate as the sources available at the time of the update.    Information Source(s): Family member and CareEverywhere/SureScripts via phone    Pertinent Information: Pt daughter, Alissa MARX [phone], manages her medications.   -pt is on Tylenol #3, dispense history shows last fill 3/25/24 90 tabs 22 days supply. Daughter says pt takes it every 6 hours, last dose was 5/28/24 AM.  -pt received all of her morning doses today 5/28/24 but has not taken any evening/bedtime doses yet.        Changes made to PTA medication list:  Added: Metamucil Capsule, Calmoseptine ointment  Deleted: Metamucil packet  Changed: None    Allergies reviewed with patient and updates made in EHR: yes    Medication History Completed By: Alphonso Wynne 5/28/2024 10:07 PM    PTA Med List   Medication Sig Last Dose    acetaminophen-codeine (TYLENOL #3) 300-30 MG per tablet Take 1 tablet by mouth every 6 hours as needed for severe pain 5/28/2024 at 1200    budesonide (UCERIS) 9 MG 24 hr tablet Take 9 mg by mouth every morning 5/28/2024 at AM    ferrous sulfate (FEROSUL) 325 (65 Fe) MG tablet Take 1 tablet (325 mg) by mouth daily (with breakfast) 5/28/2024 at AM    gabapentin (NEURONTIN) 100 MG capsule Take 100 mg by mouth daily 5/28/2024 at am    gabapentin (NEURONTIN) 100 MG capsule Take 300 mg by mouth at bedtime 5/27/2024 at Bedtime    levETIRAcetam (KEPPRA) 750 MG tablet Take 1 tablet (750 mg) by mouth 2 times daily 5/28/2024 at AM    loperamide (IMODIUM) 2 MG capsule Take 2 mg by mouth 4 times daily as needed for diarrhea Past Month at pt stopped due to constipation    menthol-zinc oxide (CALMOSEPTINE) 0.44-20.6 % OINT ointment Apply topically 4 times daily as needed for skin protection (for sores on buttox from sitting) 5/27/2024 at unknown    METAMUCIL FIBER PO Take 1 capsule by mouth daily 5/28/2024  at AM    mirtazapine (REMERON) 15 MG tablet Take 1 tablet (15 mg) by mouth at bedtime 5/27/2024 at bedtime    Multiple Vitamin (MULTIVITAMIN) TABS TAKE 1 TABLET BY MOUTH EVERY DAY 5/28/2024 at AM    nortriptyline (PAMELOR) 50 MG capsule Take 1 capsule (50 mg) by mouth at bedtime 5/27/2024 at bedtime    OXcarbazepine (TRILEPTAL) 150 MG tablet TAKE 3 TABLETS(450 MG) BY MOUTH DAILY 5/27/2024 at Bedtime    potassium chloride elin ER (KLOR-CON M20) 20 MEQ CR tablet Take 1 tablet (20 mEq) by mouth daily 5/28/2024 at AM    QUEtiapine (SEROQUEL) 50 MG tablet Take 1 tablet (50 mg) by mouth at bedtime 5/27/2024 at bedtime    topiramate (TOPAMAX) 50 MG tablet Take 1 tablet (50 mg) by mouth at bedtime 5/27/2024 at bedtime    Vitamin D3 50 mcg (2000 units) tablet Take 1 tablet (50 mcg) by mouth daily 5/28/2024 at AM

## 2024-05-29 NOTE — PLAN OF CARE
Problem: Pain Acute  Goal: Optimal Pain Control and Function  Outcome: Progressing  Intervention: Prevent or Manage Pain  Recent Flowsheet Documentation  Taken 5/29/2024 1300 by Sarina Smith RN  Medication Review/Management: medications reviewed  Taken 5/29/2024 0845 by Sarina Smith RN  Medication Review/Management: medications reviewed  Intervention: Optimize Psychosocial Wellbeing  Recent Flowsheet Documentation  Taken 5/29/2024 1300 by Sarina Smith RN  Supportive Measures:   active listening utilized   decision-making supported  Taken 5/29/2024 0845 by Sarina Smith RN  Supportive Measures:   active listening utilized   decision-making supported     Problem: Fall Injury Risk  Goal: Absence of Fall and Fall-Related Injury  Outcome: Progressing  Intervention: Identify and Manage Contributors  Recent Flowsheet Documentation  Taken 5/29/2024 1300 by Sarina Smith RN  Medication Review/Management: medications reviewed  Taken 5/29/2024 0845 by Sarina Smith RN  Medication Review/Management: medications reviewed  Intervention: Promote Injury-Free Environment  Recent Flowsheet Documentation  Taken 5/29/2024 1300 by Sarina Smith RN  Safety Promotion/Fall Prevention:   activity supervised   room near nurse's station  Taken 5/29/2024 0845 by Sarina Smith RN  Safety Promotion/Fall Prevention:   activity supervised   room near nurse's station     Problem: Pneumonia  Goal: Fluid Balance  Outcome: Progressing  Goal: Resolution of Infection Signs and Symptoms  Outcome: Progressing     Patient denies pain this shift. Resting between cares.Blood pressure soft but Map > 65. Bradycardic. MD aware. On antibiotics for pneumonia and UTI. Cultures pending. Patient does have a rash on truck and back. MD aware and saw. Antibiotics adjusted. MRI on hold at this time. Patient requires heavy sedation. Patient transferring to [facility] shortly. Report called to Geoff SAMUELS.    Sarina Smith RN

## 2024-05-29 NOTE — H&P
[facility]    History and Physical - Hospitalist Service       Date of Admission:  5/28/2024    Assessment & Plan   Julisa Chaney is a 79 year old female with a medical history significant for collagenous colitis, seizure disorder, hyperlipidemia, chronic pain syndrome, GERD, trigeminal neuralgia and other medical co morbidities who was admitted with sepsis due to multifocal pneumonia and pyelonephritis/UTI.    Patient Active Problem List   Diagnosis    Osteopenia    Hyperlipidemia    Migraine headache    Trigeminal neuralgia    Counseling regarding advanced directives and goals of care    Controlled substance agreement signed 6/1/23    Hypercalcemia    Chronic pain syndrome    Iron deficiency anemia due to chronic blood loss    Abnormal chest CT    Mild major depression (H)    Pyloric ulcer, chronic    Pyogenic brain abscess    F11.2 - Continuous opioid dependence (H)    Malnutrition, unspecified type (H24)    Current mild episode of major depressive disorder without prior episode (H24)    Falls frequently    Hypokalemia    Failure to thrive in adult    Seizure disorder (H)    Chronic diarrhea    Collagenous colitis    Hypotension due to hypovolemia    Hypomagnesemia    Hypophosphatemia    Hypernatremia    Sepsis (H)    Acute UTI    Bilateral pneumonia      Sepsis-due to  and respiratory infection.  Patient covered broadly on cefepime, vancomycin and azithromycin.  Patient with history of MRSA.  Follow-up on on blood cultures.  Check urine strep and urine Legionella.  Follow-up on blood and urine cultures.  Gentle hydration.  She is most suggestive of aspiration.  Will change to vancomycin and meropenem.    Bilateral multifocal pneumonia-community-acquired pneumonia versus aspiration pneumonia.  Patient started on cefepime, azithromycin and vancomycin.   follow-up on urine and blood cultures.  Optimize lung cares with breathing treatments    UTI versus pyelonephritis-continue cefepime  "for now    Gastritis/GERD.  -Pepcid twice daily    Constipation -MiraLAX daily.    Collagenous colitis-noted patient's is always between diarrhea and constipation.    Trigeminal neuralgia-continue Neurontin    ?  COPD-optimize lung cares with breathing treatments    Failure to thrive-consider nutrition input, progressive weakness, falls, forgetfulness, low blood pressure, muscle weakness. ?  Adrenal insufficiency.  MRI from 7/30/2022 with ventriculitis and focal abscesses.  Will obtain an MRI of the brain, also check thyroid function, random cortisol consider ACTH.  Patient reports about 20 to 30 pound weight loss    Chronic pain syndrome-pain control as needed    Weakness-PT OT and  before discharge    Rest of patient's medical problems management remains unchanged       Diet:  Clear liquids  DVT Prophylaxis: Pneumatic Compression Devices  Abrams Catheter: Not present  Lines: None     Cardiac Monitoring: Yes  Code Status:  Full code    Clinically Significant Risk Factors Present on Admission                       # Cachexia: Estimated body mass index is 14.98 kg/m  as calculated from the following:    Height as of this encounter: 1.651 m (5' 5\").    Weight as of this encounter: 40.8 kg (90 lb).              Disposition Plan     Medically Ready for Discharge: Anticipated in 2-4 Days           Glenys Buchanan MD  Hospitalist Service  [facility]  Securely message with Plink Search (more info)  Text page via [facility] Paging/Directory     ______________________________________________________________________    Chief Complaint   Generalized weakness and tremors      History of Present Illness   Julisa Chaney is a 79 year old female with a medical history significant for collagenous colitis, seizure disorder, hyperlipidemia, chronic pain syndrome, GERD, trigeminal neuralgia and other medical co morbidities who was admitted with sepsis due to multifocal pneumonia and " pyelonephritis.    Patient was seen in the ER on admission.  She was oriented to place person and time and could provide a history.  Per history, today at 6:00 the patient was noted to have assisted the patient to make dinner and give her medications.  The patient had no eating anything prior to her daughter visiting.  She reports that generalized weakness, tremors, facial pain fevers and mid abdominal pain and she was brought to the ER.  Additionally the patient is reported not to have had a bowel movement in over a week.    Review of systems was negative for nausea, vomiting, diarrhea, chest pain, shortness of breath, cough, rhinorrhea, dysuria, hematuria, bloody stool, blood thinners, trauma, history of abdominal surgery, or any other complaints at this time.        Per chart review,  4/16/24 The patient visited [facility] for collagenous colitis. She is on omeprazole and she probably needs to continue on that since she has had complicated peptic ulcer disease. The fact that she did not stool for 2 days in the hospital may suggest she could have a component of constipation and overflow diarrhea and we should rule out overflow at least once with an x-ray.      4/9/24 The patient was admitted to [facility] for diarrhea and dehydration. Stool studies, celiac panel, and TSH were normal. She was started on budesonide.       In the ER patient's oxygen saturation was 88% on room air.  BMP showed a sodium of 133 otherwise unremarkable, VBG showed a pH of 7.38 pCO2 of 39, UA was positive for nitrite and pyuria.  CBC showed a white count of 24,000 hemoglobin 13.2 platelet 283.  Blood and urine cultures were done.    EKG done showed nonspecific ST T changes without ST elevations.  Initial troponin was 17.  ER intervention included starting patient on cefepime and vancomycin as well as azithromycin to cover community-acquired pneumonia and ?  Aspiration pneumonia.      CT chest abdomen and pelvis done showed results  below:  IMPRESSION:     1.  Multifocal pneumonia, largest and most consolidative in the right lower lobe.     2.  Mild emphysema with indeterminate right pulmonary nodules measuring up to 8 mm in the superior segment of the right lower lobe. Recommend follow-up chest CT in 3-6 months then at 18-24 months per Fleischner Society 2017 guideline.     3.  Mild wall thickening of the gastric antrum, either representing mild gastritis versus artifact of under distention.     4.  Diffuse wall thickening of a relatively decompressed gallbladder, unchanged from prior study, possibly due to adenomyomatosis.     5.  Moderate colonic stool burden.      Patient reports difficulty with swallowing and having to do maneuvers.  She also informs me about history of falls at home, forgetfulness.  She was noted to have some difficulty with recall sometimes.  She also has had progressive unintentional weight loss.  MRI from 722 with results below:    Narrative & Impression   EXAM: MR BRAIN W CONTRAST STEREOTACTIC  LOCATION: [facility]  DATE/TIME: 7/30/2022 8:54 PM     INDICATION: Ventricular size and cerebral abscess.  COMPARISON: 07/27/2022.  CONTRAST: 15 mL Gadavist  TECHNIQUE: MRI brain was performed with contrast via stereotactic protocol.     FINDINGS: On the enhanced images there is enhancement lining the ependymal surface of the ventricular system primarily the frontal horn of the left lateral ventricle and the fourth ventricle. There is also enhancement involving the dependent portions of   the lateral ventricles. There is motion on these images the patient had a hard time holding still. There is vasogenic edema surrounding the ventricular system and primarily surrounding the frontal horn of the left lateral ventricle. There is no evidence   of a midline shift. There is stable diffuse age related changes along with old lacunar infarcts right thalamus and right danyel. Multiple areas of encephalomalacia are  noted within the cerebellar hemispheres.                                                                      IMPRESSION:  1. Ventriculitis along with focal abscess formation frontal horn left lateral ventricle.  2.  Stable diffuse age related changes.  3.  MRI performed without contrast via stereotactic protocol.          Past Medical History    Past Medical History:   Diagnosis Date    Aspiration pneumonia (H) 02/2022    Depression     High cholesterol     Migraines     Pyloric ulcer, chronic     Sepsis (H) 08/10/2020    Trigeminal neuralgia        Past Surgical History   Past Surgical History:   Procedure Laterality Date    COLONOSCOPY N/A 1/19/2024    Procedure: COLONOSCOPY WITH RANDOM BIOPSIES;  Surgeon: Antonio Mcelroy MD, MD;  Location: [facility]    HYSTERECTOMY      IR GASTROSTOMY TUBE PERCUTANEOUS PLCMNT  8/16/2022    PICC TRIPLE LUMEN PLACEMENT  7/22/2022         SD ESOPHAGOGASTRODUODENOSCOPY TRANSORAL DIAGNOSTIC N/A 8/13/2020    Procedure: ESOPHAGOGASTRODUODENOSCOPY (EGD);  Surgeon: Nathan Smalls MD;  Location: [facility];  Service: Gastroenterology    SD ESOPHAGOGASTRODUODENOSCOPY TRANSORAL DIAGNOSTIC N/A 8/14/2020    Procedure: ESOPHAGOGASTRODUODENOSCOPY (EGD), PYLORIC DILATION;  Surgeon: Nathan Smalls MD;  Location: [facility];  Service: Gastroenterology    VENTRICULOSTOMY Left 7/31/2022    Procedure: LEFT FRONTAL VENTRICULOSTOMY;  Surgeon: Brady Heredia MD;  Location: [facility] COLONOSCOPY W/WO BRUSH/WASH N/A 8/13/2020    Procedure: COLONOSCOPY WITH POLYPECTOMY;  Surgeon: Nathan Smalls MD;  Location: [facility];  Service: Gastroenterology       Prior to Admission Medications   Prior to Admission Medications   Prescriptions Last Dose Informant Patient Reported? Taking?   METAMUCIL FIBER PO 5/28/2024 at AM  Yes Yes   Sig: Take 1 capsule by mouth daily   Multiple Vitamin (MULTIVITAMIN) TABS 5/28/2024 at AM  No Yes   Sig: TAKE 1 TABLET BY MOUTH EVERY DAY    OXcarbazepine (TRILEPTAL) 150 MG tablet 5/27/2024 at Bedtime  No Yes   Sig: TAKE 3 TABLETS(450 MG) BY MOUTH DAILY   QUEtiapine (SEROQUEL) 50 MG tablet 5/27/2024 at bedtime  No Yes   Sig: Take 1 tablet (50 mg) by mouth at bedtime   Vitamin D3 50 mcg (2000 units) tablet 5/28/2024 at AM  No Yes   Sig: Take 1 tablet (50 mcg) by mouth daily   acetaminophen-codeine (TYLENOL #3) 300-30 MG per tablet 5/28/2024 at 1200  No Yes   Sig: Take 1 tablet by mouth every 6 hours as needed for severe pain   budesonide (UCERIS) 9 MG 24 hr tablet 5/28/2024 at AM  Yes Yes   Sig: Take 9 mg by mouth every morning   ferrous sulfate (FEROSUL) 325 (65 Fe) MG tablet 5/28/2024 at AM  No Yes   Sig: Take 1 tablet (325 mg) by mouth daily (with breakfast)   gabapentin (NEURONTIN) 100 MG capsule 5/28/2024 at am  Yes Yes   Sig: Take 100 mg by mouth daily   gabapentin (NEURONTIN) 100 MG capsule 5/27/2024 at Bedtime  Yes Yes   Sig: Take 300 mg by mouth at bedtime   levETIRAcetam (KEPPRA) 750 MG tablet 5/28/2024 at AM  No Yes   Sig: Take 1 tablet (750 mg) by mouth 2 times daily   loperamide (IMODIUM) 2 MG capsule Past Month at pt stopped due to constipation  Yes Yes   Sig: Take 2 mg by mouth 4 times daily as needed for diarrhea   menthol-zinc oxide (CALMOSEPTINE) 0.44-20.6 % OINT ointment 5/27/2024 at unknown  Yes Yes   Sig: Apply topically 4 times daily as needed for skin protection (for sores on buttox from sitting)   mirtazapine (REMERON) 15 MG tablet 5/27/2024 at bedtime  No Yes   Sig: Take 1 tablet (15 mg) by mouth at bedtime   nortriptyline (PAMELOR) 50 MG capsule 5/27/2024 at bedtime  No Yes   Sig: Take 1 capsule (50 mg) by mouth at bedtime   potassium chloride elin ER (KLOR-CON M20) 20 MEQ CR tablet 5/28/2024 at AM  No Yes   Sig: Take 1 tablet (20 mEq) by mouth daily   topiramate (TOPAMAX) 50 MG tablet 5/27/2024 at bedtime  No Yes   Sig: Take 1 tablet (50 mg) by mouth at bedtime      Facility-Administered Medications: None        Review of  Systems    The 10 point Review of Systems is negative other than noted in the HPI or here.     Social History   I have reviewed this patient's social history and updated it with pertinent information if needed.  Social History     Tobacco Use    Smoking status: Former     Current packs/day: 0.00     Average packs/day: 1 pack/day for 50.0 years (50.0 ttl pk-yrs)     Types: Cigarettes     Start date: 1964     Quit date: 2014     Years since quittin.5    Smokeless tobacco: Never   Substance Use Topics    Alcohol use: No    Drug use: No         Family History   I have reviewed this patient's family history and updated it with pertinent information if needed.  Family History   Problem Relation Age of Onset    Cancer Father     Testicular cancer Grandchild 17.00    Breast Cancer Mother     Breast Cancer Sister     Breast Cancer Maternal Aunt     Breast Cancer Sister          Allergies   Allergies   Allergen Reactions    Contrast [Iohexol] Rash     Noticed during 2020 admission. Received IV contrast on     Chocolate Headache    Contrast Dye Rash    Penicillins Rash        Physical Exam   Vital Signs: Temp: 98.6  F (37  C) Temp src: Oral BP: 96/53 Pulse: 78   Resp: 23 SpO2: 96 % O2 Device: Nasal cannula Oxygen Delivery: 2 LPM  Weight: 90 lbs 0 oz      General Aox3, appropriate affect, NAD, on RA, FTT, frail, forgetful  HEENT  MMM, EOMI, PERRL  Chest decreased A/E  at lung bases, No wheezing  Heart RRR, No M/R/G  Abd- Soft, NT, BS+  - Deferred,   Extremity- Moving all extremities, No digital clubbing,   No edema  Neuro- Aox3, in all extremities, downwards,  gait not checked  Skin  Has no tattoo, No skin rash     Medical Decision Making       85 MINUTES SPENT BY ME on the date of service doing chart review, history, exam, documentation & further activities per the note.      ------------------ MEDICAL DECISION MAKING  ------------------------------------------------------------------------------------------------------  MANAGEMENT DISCUSSED with the following over the past 24 hours: 85       Data   ------------------------- PAST 24 HR DATA REVIEWED -----------------------------------------------    I have personally reviewed the following data over the past 24 hrs:    24.6 (H)  \   13.2   / 283     133 (L) 100 15.8 /  210 (H)   4.2 22 0.55 \     ALT: 25 AST: 19 AP: 151 (H) TBILI: 0.2   ALB: 3.7 TOT PROTEIN: 7.6 LIPASE: N/A     Trop: 17 (H) BNP: N/A     Procal: N/A CRP: N/A Lactic Acid: 1.2         Imaging results reviewed over the past 24 hrs:   Recent Results (from the past 24 hour(s))   CT Chest/Abdomen/Pelvis w Contrast    Addendum: 5/29/2024    ADDENDUM:  This addendum is issued to highlight findings of subtle wedge-shaped hypoattenuations in the right lower pole kidney mentioned in the body of report. These are indeterminate but may represent mild pyelonephritis. Recommend correlation with urinalysis.    This addendum is relayed to patien't nurse Darcy Hickey at 12:05 AM on 5/29/2024.    END ADDENDUM      Narrative    EXAM: CT CHEST/ABDOMEN/PELVIS W CONTRAST  LOCATION: [facility]  DATE: 5/28/2024    INDICATION: Generalised weakness, leukocytosis, tender abdomen on exam  COMPARISON: 1/17/2024  TECHNIQUE: CT scan of the chest, abdomen, and pelvis was performed following injection of IV contrast. Multiplanar reformats were obtained. Dose reduction techniques were used.   CONTRAST: 44 ml Isovue 370    FINDINGS:   LUNGS AND PLEURA: Multiple patchy opacities and consolidation seen in the right lung with largest consolidation in the right lower lobe. Scattered tree-in-bud nodules also seen in the superior segment of the left lower lobe and lingula, compatible with   an infectious etiology as well. No pleural effusion or pneumothorax. An 8 mm nodule is seen in the superior segment of the right lower lobe on  series 4 image 121. A 4 mm nodule in the right upper lobe on series 4 image 77. Mild emphysema is present.    MEDIASTINUM/AXILLAE: A mildly enlarged right paratracheal lymph node measures 2 x 1.4 cm, likely reactive. No axillary or supraclavicular lymphadenopathy.    CORONARY ARTERY CALCIFICATION: Moderate three-vessel coronary artery atherosclerotic calcifications.    HEPATOBILIARY: Liver is normal. Mild intrahepatic biliary ductal dilatation is indeterminate. Common bile duct is normal in caliber. Gallbladder is diffusely thick-walled but fairly decompressed. This finding is similar to prior study.    PANCREAS: Diffuse atrophy without pancreatic ductal dilatation or peripancreatic fluid collections.    SPLEEN: Normal.    ADRENAL GLANDS: Normal.    KIDNEYS/BLADDER: Small areas of wedge-shaped hypoattenuation in the lower pole of the right kidney (series 3 image 185). No urinary stones or hydroureteronephrosis. Urinary bladder is mildly distended but normal.    BOWEL: Mild wall thickening of the relatively decompressed gastric antrum, indeterminate. Remainder of the gastrointestinal tract including the appendix is normal. Moderate amount of stool seen in the colon.    LYMPH NODES: Normal.    VASCULATURE: Advanced aortoiliac atherosclerotic calcifications. No abdominal aortic aneurysm.    PELVIC ORGANS: Status post hysterectomy. No abnormal adnexal masses.    MUSCULOSKELETAL: Moderate degenerative disc space narrowing from L4 to S1. Mild degenerative height loss of T12. Mild levoscoliosis of the spine. No suspicious osseous lesions or acute fractures.        Impression    IMPRESSION:    1.  Multifocal pneumonia, largest and most consolidative in the right lower lobe.    2.  Mild emphysema with indeterminate right pulmonary nodules measuring up to 8 mm in the superior segment of the right lower lobe. Recommend follow-up chest CT in 3-6 months then at 18-24 months per Fleischner Society 2017 guideline.    3.  Mild wall  thickening of the gastric antrum, either representing mild gastritis versus artifact of under distention.    4.  Diffuse wall thickening of a relatively decompressed gallbladder, unchanged from prior study, possibly due to adenomyomatosis.    5.  Moderate colonic stool burden.

## 2024-05-29 NOTE — CONSULTS
Care Management Initial Consult    General Information  Assessment completed with: VM-chart review, Patient, pt  Type of CM/SW Visit: Initial Assessment    Primary Care Provider verified and updated as needed: Yes   Readmission within the last 30 days: current reason for admission unrelated to previous admission   Return Category: New Diagnosis  Reason for Consult: discharge planning  Advance Care Planning: Advance Care Planning Reviewed: verified with patient, no concerns identified     General Information Comments: lives alone and ind w/wheelchair but recently admitted to hospital then TCU for most of April and part of May    Communication Assessment  Patient's communication style: spoken language (English or Bilingual)             Cognitive  Cognitive/Neuro/Behavioral: WDL                      Living Environment:   People in home: alone     Current living Arrangements: house      Able to return to prior arrangements: yes       Family/Social Support:  Care provided by: self  Provides care for: no one, unable/limited ability to care for self  Marital Status: Single  Children          Description of Support System: Involved, Supportive    Support Assessment: Adequate family and caregiver support, Adequate social supports    Current Resources:   Patient receiving home care services: No     Community Resources: None  Equipment currently used at home: walker, standard  Supplies currently used at home: None    Employment/Financial:  Employment Status:          Financial Concerns: none   Referral to Financial Worker: No       Does the patient's insurance plan have a 3 day qualifying hospital stay waiver?  Yes     Which insurance plan 3 day waiver is available? Alternative insurance waiver    Will the waiver be used for post-acute placement? Undetermined at this time    Lifestyle & Psychosocial Needs:  Social Determinants of Health     Food Insecurity: No Food Insecurity (4/9/2024)    Received from [facility]    Food Insecurity     Worried About Running Out of Food in the Last Year: 1   Depression: Not at risk (1/11/2024)    PHQ-2     PHQ-2 Score: 0   Housing Stability: Low Risk  (4/9/2024)    Received from [facility]    Housing Stability     Unable to Pay for Housing in the Last Year: 1   Tobacco Use: Medium Risk (4/9/2024)    Received from [facility]    Patient History     Smoking Tobacco Use: Former     Smokeless Tobacco Use: Never     Passive Exposure: Not on file   Financial Resource Strain: Low Risk  (4/9/2024)    Received from [facility]    Financial Resource Strain     Difficulty of Paying Living Expenses: 3     Difficulty of Paying Living Expenses: Not on file   Alcohol Use: Not on file   Transportation Needs: Unmet Transportation Needs (4/9/2024)    Received from [facility]    Transportation Needs     Lack of Transportation (Medical): 2   Physical Activity: Not on file   Interpersonal Safety: Not on file   Stress: Not on file   Social Connections: Socially Integrated (4/9/2024)    [email or web address]    Social Connections     Frequency of Communication with Friends and Family: 0   Health Literacy: Not on file       Functional Status:  Prior to admission patient needed assistance:   Dependent ADLs:: Wheelchair-independent  Dependent IADLs:: Cleaning, Transportation, Cooking, Laundry, Shopping, Meal Preparation, Medication Management  Assesssment of Functional Status: Not at baseline with ADL Functioning    Mental Health Status:  Mental Health Status: No Current Concerns       Chemical Dependency Status:                Values/Beliefs:  Spiritual, Cultural Beliefs, [employer] Practices, Values that affect care:                 Additional Information:  Assessed, lives alone and ind w/wheelchair, dtr Alissa to transport.  Recent admit 4/9-4/12 at [facility] Dc'd to [facility] 4/12-5/6 and then home w/homecare. Pt denies homecare [email or web address] to follow for discharge needs.      Mil Rivas RN

## 2024-05-29 NOTE — ED NOTES
Bed: JNED-24  Expected date: 5/28/24  Expected time: 9:09 PM  Means of arrival: Wheelchair  Comments:

## 2024-05-29 NOTE — CONSULTS
"CLINICAL NUTRITION SERVICES - ASSESSMENT NOTE     Nutrition Prescription    RECOMMENDATIONS FOR MDs/PROVIDERS TO ORDER:  Recommend diet advance as able     Malnutrition Status:    Moderate malnutrition  In Context of:  Acute illness or disease    Recommendations already ordered by Registered Dietitian (RD):  ONS- Ensure clear apple daily + Gel Plus BID   New weight     Future/Additional Recommendations:  Monitor po intake, diet adv., weight, BM.      REASON FOR ASSESSMENT  Julisa Chaney is a/an 79 year old female assessed by the dietitian for RN Consult - FTT    HPI: Pt with a medical history significant for collagenous colitis, seizure disorder, hyperlipidemia, chronic pain syndrome, GERD, trigeminal neuralgia and other medical co morbidities who was admitted with sepsis due to multifocal pneumonia and pyelonephritis/UTI.     NUTRITION HISTORY  Met with pt at bedside this afternoon. Pt reports at baseline her dtr and family prepare food for her, states she has access to enough food but is not very hungry.  Pt state she has been eating fine but less than baseline. Pt reports ongoing constipation- states last BM occurred >5 days ago.   Food Allergy: Chocolate (headache)     Pt last seen by hospital RD 1/23/24     CURRENT NUTRITION ORDERS  Diet: Clear Liquid  Intake/Tolerance:   Pt consuming 50% breakfast on CL diet this AM.     LABS  Reviewed  Creat 0.48(L)  (H)     MEDICATIONS  Reviewed   Continuous LR at 75 ml/hr   Scheduled zithromax, entocort ec, pepcid, neurontin, keppra, merrem, remeron, pamelor, trileptal, metamucil, vancomycin    ANTHROPOMETRICS  Height: 165.1 cm (5' 5\")  Most Recent Weight: 46.8 kg (103 lb 3.2 oz) 5/29/24  IBW: 56.8 kg  BMI: Underweight BMI <18.5  Weight History: 5.6% wt loss x 6 months  Wt Readings from Last 10 Encounters:   05/29/24 46.8 kg (103 lb 3.2 oz)   05/03/24 41.2 kg (90 lb 12.8 oz)   04/24/24 41.2 kg (90 lb 12.8 oz)   04/19/24 44 kg (97 lb)   04/15/24 44 kg (97 lb) "   04/05/24 41.9 kg (92 lb 6 oz)   03/04/24 45.4 kg (100 lb)   02/14/24 44.6 kg (98 lb 6.4 oz)   02/12/24 45.3 kg (99 lb 12.8 oz)   02/08/24 45.3 kg (99 lb 12.8 oz)     01/18/24 51.1 kg (112 lb 10.5 oz)   11/10/23 49.6 kg (109 lb 6.4 oz)   11/06/23 49.6 kg (109 lb 6.4 oz)   11/02/23 50.1 kg (110 lb 6.4 oz)   10/27/23 54.9 kg (121 lb)   10/25/23 55.2 kg (121 lb 11.1 oz)   10/24/23 55.2 kg (121 lb 11.1 oz)   10/16/23 50.3 kg (111 lb)   08/24/23 49.9 kg (110 lb)   07/21/23 49.1 kg (108 lb 4.8 oz)     Dosing Weight: 56.8 kg IBW    ASSESSED NUTRITION NEEDS  Estimated Energy Needs: 1704+ kcals/day (30+ kcals/kg)  Justification: Underweight  Estimated Protein Needs: 56-69 grams protein/day (1 - 1.2 grams of pro/kg)  Justification: Increased needs  Estimated Fluid Needs: 1420 mL/day (25 mL/kg)   Justification: Maintenance    PHYSICAL FINDINGS/GI CONCERNS  See malnutrition section below.  Per Flowsheets:   GI: Constipation   BM: None noted     MALNUTRITION:  % Weight Loss:  Weight loss does not meet criteria for malnutrition   % Intake:  <75% for > 7 days (moderate malnutrition)  Subcutaneous Fat Loss:  Upper arm region moderate depletion  Muscle Loss:  Temporal region moderate depletion, Clavicle bone region severe depletion, Acromion bone region moderate depletion, and Scapular bone region moderate/severe depletion  Fluid Retention:  None noted    Malnutrition Diagnosis: Moderate malnutrition  In Context of:  Acute illness or disease    NUTRITION DIAGNOSIS  Malnutrition related to acute illness as evidenced by inadequate oral intakes <75% > 7 days, muscle and fat losses.        INTERVENTIONS  Implementation  ONS- Ensure clear apple daily + Gel Plus BID   New weight   Recommend diet advance as able     Goals  Pt to meet >75% nutritional needs   Electrolytes WNL   BG < 180      Monitoring/Evaluation  Progress toward goals will be monitored and evaluated per protocol.

## 2024-05-30 ENCOUNTER — APPOINTMENT (OUTPATIENT)
Dept: PHYSICAL THERAPY | Facility: HOSPITAL | Age: 79
DRG: 871 | End: 2024-05-30
Attending: INTERNAL MEDICINE
Payer: COMMERCIAL

## 2024-05-30 ENCOUNTER — APPOINTMENT (OUTPATIENT)
Dept: OCCUPATIONAL THERAPY | Facility: HOSPITAL | Age: 79
DRG: 871 | End: 2024-05-30
Attending: INTERNAL MEDICINE
Payer: COMMERCIAL

## 2024-05-30 LAB
ACTH PLAS-MCNC: <10 PG/ML
ANION GAP SERPL CALCULATED.3IONS-SCNC: 7 MMOL/L (ref 7–15)
BACTERIA UR CULT: ABNORMAL
BUN SERPL-MCNC: 6.9 MG/DL (ref 8–23)
CALCIUM SERPL-MCNC: 8.5 MG/DL (ref 8.8–10.2)
CHLORIDE SERPL-SCNC: 111 MMOL/L (ref 98–107)
CREAT SERPL-MCNC: 0.52 MG/DL (ref 0.51–0.95)
DEPRECATED HCO3 PLAS-SCNC: 24 MMOL/L (ref 22–29)
EGFRCR SERPLBLD CKD-EPI 2021: >90 ML/MIN/1.73M2
ERYTHROCYTE [DISTWIDTH] IN BLOOD BY AUTOMATED COUNT: 16.5 % (ref 10–15)
GLUCOSE SERPL-MCNC: 84 MG/DL (ref 70–99)
HCT VFR BLD AUTO: 36.5 % (ref 35–47)
HEMOCCULT STL QL: NEGATIVE
HGB BLD-MCNC: 10.8 G/DL (ref 11.7–15.7)
HGB BLD-MCNC: 11.1 G/DL (ref 11.7–15.7)
MCH RBC QN AUTO: 26.5 PG (ref 26.5–33)
MCHC RBC AUTO-ENTMCNC: 29.6 G/DL (ref 31.5–36.5)
MCV RBC AUTO: 90 FL (ref 78–100)
PLATELET # BLD AUTO: 251 10E3/UL (ref 150–450)
POTASSIUM SERPL-SCNC: 3.4 MMOL/L (ref 3.4–5.3)
RBC # BLD AUTO: 4.08 10E6/UL (ref 3.8–5.2)
SODIUM SERPL-SCNC: 142 MMOL/L (ref 135–145)
TOPIRAMATE SERPL-MCNC: <1.5 UG/ML
VANCOMYCIN SERPL-MCNC: 10.2 UG/ML
WBC # BLD AUTO: 9 10E3/UL (ref 4–11)

## 2024-05-30 PROCEDURE — 120N000004 HC R&B MS OVERFLOW

## 2024-05-30 PROCEDURE — 36415 COLL VENOUS BLD VENIPUNCTURE: CPT | Performed by: INTERNAL MEDICINE

## 2024-05-30 PROCEDURE — 97165 OT EVAL LOW COMPLEX 30 MIN: CPT | Mod: GO

## 2024-05-30 PROCEDURE — 99233 SBSQ HOSP IP/OBS HIGH 50: CPT | Performed by: INTERNAL MEDICINE

## 2024-05-30 PROCEDURE — 97530 THERAPEUTIC ACTIVITIES: CPT | Mod: GO

## 2024-05-30 PROCEDURE — 97530 THERAPEUTIC ACTIVITIES: CPT | Mod: GP

## 2024-05-30 PROCEDURE — 250N000011 HC RX IP 250 OP 636: Performed by: INTERNAL MEDICINE

## 2024-05-30 PROCEDURE — 85027 COMPLETE CBC AUTOMATED: CPT | Performed by: FAMILY MEDICINE

## 2024-05-30 PROCEDURE — 250N000011 HC RX IP 250 OP 636: Performed by: FAMILY MEDICINE

## 2024-05-30 PROCEDURE — 97110 THERAPEUTIC EXERCISES: CPT | Mod: GO

## 2024-05-30 PROCEDURE — 80202 ASSAY OF VANCOMYCIN: CPT | Performed by: FAMILY MEDICINE

## 2024-05-30 PROCEDURE — 82272 OCCULT BLD FECES 1-3 TESTS: CPT | Performed by: INTERNAL MEDICINE

## 2024-05-30 PROCEDURE — 36415 COLL VENOUS BLD VENIPUNCTURE: CPT | Performed by: FAMILY MEDICINE

## 2024-05-30 PROCEDURE — 97161 PT EVAL LOW COMPLEX 20 MIN: CPT | Mod: GP

## 2024-05-30 PROCEDURE — 80048 BASIC METABOLIC PNL TOTAL CA: CPT | Performed by: FAMILY MEDICINE

## 2024-05-30 PROCEDURE — 258N000003 HC RX IP 258 OP 636: Performed by: INTERNAL MEDICINE

## 2024-05-30 PROCEDURE — 250N000013 HC RX MED GY IP 250 OP 250 PS 637: Performed by: INTERNAL MEDICINE

## 2024-05-30 PROCEDURE — 85018 HEMOGLOBIN: CPT | Performed by: INTERNAL MEDICINE

## 2024-05-30 PROCEDURE — 250N000013 HC RX MED GY IP 250 OP 250 PS 637: Performed by: FAMILY MEDICINE

## 2024-05-30 PROCEDURE — 258N000003 HC RX IP 258 OP 636: Performed by: FAMILY MEDICINE

## 2024-05-30 RX ORDER — PANTOPRAZOLE SODIUM 40 MG/1
40 TABLET, DELAYED RELEASE ORAL
Status: DISCONTINUED | OUTPATIENT
Start: 2024-05-31 | End: 2024-06-03 | Stop reason: HOSPADM

## 2024-05-30 RX ORDER — OXYCODONE HYDROCHLORIDE 5 MG/1
5 TABLET ORAL ONCE
Status: COMPLETED | OUTPATIENT
Start: 2024-05-30 | End: 2024-05-30

## 2024-05-30 RX ORDER — POLYETHYLENE GLYCOL 3350 17 G/17G
17 POWDER, FOR SOLUTION ORAL DAILY
Status: DISCONTINUED | OUTPATIENT
Start: 2024-05-30 | End: 2024-06-03 | Stop reason: HOSPADM

## 2024-05-30 RX ADMIN — ACETAMINOPHEN 325 MG: 325 TABLET ORAL at 09:07

## 2024-05-30 RX ADMIN — NORTRIPTYLINE HYDROCHLORIDE 50 MG: 50 CAPSULE ORAL at 21:10

## 2024-05-30 RX ADMIN — SODIUM CHLORIDE 750 MG: 9 INJECTION, SOLUTION INTRAVENOUS at 06:12

## 2024-05-30 RX ADMIN — BUDESONIDE 9 MG: 3 CAPSULE, GELATIN COATED ORAL at 09:15

## 2024-05-30 RX ADMIN — MIRTAZAPINE 15 MG: 7.5 TABLET, FILM COATED ORAL at 21:10

## 2024-05-30 RX ADMIN — OXCARBAZEPINE 450 MG: 150 TABLET, FILM COATED ORAL at 21:10

## 2024-05-30 RX ADMIN — LEVETIRACETAM 750 MG: 250 TABLET, FILM COATED ORAL at 09:17

## 2024-05-30 RX ADMIN — ENOXAPARIN SODIUM 30 MG: 30 INJECTION SUBCUTANEOUS at 21:10

## 2024-05-30 RX ADMIN — ACETAMINOPHEN AND CODEINE PHOSPHATE 1 TABLET: 300; 30 TABLET ORAL at 18:15

## 2024-05-30 RX ADMIN — LEVETIRACETAM 750 MG: 250 TABLET, FILM COATED ORAL at 21:10

## 2024-05-30 RX ADMIN — LEVOFLOXACIN 500 MG: 500 TABLET, FILM COATED ORAL at 09:16

## 2024-05-30 RX ADMIN — POLYETHYLENE GLYCOL 3350 17 G: 17 POWDER, FOR SOLUTION ORAL at 15:34

## 2024-05-30 RX ADMIN — FAMOTIDINE 20 MG: 10 INJECTION, SOLUTION INTRAVENOUS at 09:19

## 2024-05-30 RX ADMIN — GABAPENTIN 300 MG: 300 CAPSULE ORAL at 21:10

## 2024-05-30 RX ADMIN — ACETAMINOPHEN AND CODEINE PHOSPHATE 1 TABLET: 300; 30 TABLET ORAL at 10:53

## 2024-05-30 RX ADMIN — GABAPENTIN 100 MG: 100 CAPSULE ORAL at 09:16

## 2024-05-30 RX ADMIN — SODIUM CHLORIDE, POTASSIUM CHLORIDE, SODIUM LACTATE AND CALCIUM CHLORIDE: 600; 310; 30; 20 INJECTION, SOLUTION INTRAVENOUS at 10:55

## 2024-05-30 RX ADMIN — SODIUM CHLORIDE 750 MG: 9 INJECTION, SOLUTION INTRAVENOUS at 18:05

## 2024-05-30 RX ADMIN — PSYLLIUM HUSK 1 PACKET: 3.4 POWDER ORAL at 09:09

## 2024-05-30 RX ADMIN — OXYCODONE HYDROCHLORIDE 5 MG: 5 TABLET ORAL at 15:52

## 2024-05-30 ASSESSMENT — ACTIVITIES OF DAILY LIVING (ADL)
ADLS_ACUITY_SCORE: 50
ADLS_ACUITY_SCORE: 54
ADLS_ACUITY_SCORE: 50

## 2024-05-30 NOTE — PHARMACY-VANCOMYCIN DOSING SERVICE
Pharmacy Vancomycin Note  Date of Service May 30, 2024  Patient's  1945   79 year old, female    Indication: Aspiration Pneumonia and Community Acquired Pneumonia  Day of Therapy: 3  Current vancomycin regimen:  750 mg IV q12h  Current vancomycin monitoring method: AUC  Current vancomycin therapeutic monitoring goal: 400-600 mg*h/L    Insight[employer] Prediction of Current Vancomycin Regimen  Loading dose: N/A  Regimen: 750 mg IV every 12 hours.  Start time: 18:12 on 2024  Exposure target: AUC24 (range)400-600 mg/L.hr   AUC24,ss: 552 mg/L.hr  Probability of AUC24 > 400: 97 %  Ctrough,ss: 17.3 mg/L  Probability of Ctrough,ss > 20: 27 %  Probability of nephrotoxicity (Lodise ESTIVEN ): 13 %    Current estimated CrCl = Estimated Creatinine Clearance: 64.4 mL/min (based on SCr of 0.52 mg/dL).    Creatinine for last 3 days  2024:  7:20 PM Creatinine 0.58 mg/dL;  9:03 PM Creatinine 0.55 mg/dL  2024:  8:45 AM Creatinine 0.48 mg/dL  2024:  5:23 AM Creatinine 0.52 mg/dL    Recent Vancomycin Levels (past 3 days)  2024:  5:23 AM Vancomycin 10.2 ug/mL    Vancomycin IV Administrations (past 72 hours)                     vancomycin (VANCOCIN) 750 mg in sodium chloride 0.9 % 250 mL intermittent infusion (mg) 750 mg New Bag 24 0612     750 mg New Bag 24 1744    vancomycin (VANCOCIN) 1,000 mg in 200 mL dextrose intermittent infusion (mg) 1,000 mg New Bag 24 2350                    Nephrotoxins and other renal medications (From now, onward)      Start     Dose/Rate Route Frequency Ordered Stop    24 1800  vancomycin (VANCOCIN) 750 mg in sodium chloride 0.9 % 250 mL intermittent infusion         750 mg  over 60 Minutes Intravenous EVERY 12 HOURS 24 1507                 Contrast Orders - past 72 hours (72h ago, onward)      Start     Dose/Rate Route Frequency Stop    24 2330  iopamidol (ISOVUE-370) solution 44 mL         44 mL Intravenous ONCE 24 2315             Interpretation of levels and current regimen:  Vancomycin level is reflective of -600    Has serum creatinine changed greater than 50% in last 72 hours: No    Urine output:  unable to determine    Renal Function: Stable    [employer]ight[employer] Prediction of Planned New Vancomycin Regimen  Loading dose: N/A  Regimen: 750 mg IV every 12 hours.  Start time: 18:12 on 05/30/2024  Exposure target: AUC24 (range)400-600 mg/L.hr   AUC24,ss: 552 mg/L.hr  Probability of AUC24 > 400: 97 %  Ctrough,ss: 17.3 mg/L  Probability of Ctrough,ss > 20: 27 %  Probability of nephrotoxicity (Lodise ESTIVEN 2009): 13 %    Plan:  Continue Current Dose. Per insight, patient is a good fit.   Vancomycin monitoring method: AUC  Vancomycin therapeutic monitoring goal: 400-600 mg*h/L  Pharmacy will check vancomycin levels as appropriate in 1-3 Days.  Serum creatinine levels will be ordered daily for the first week of therapy and at least twice weekly for subsequent weeks.    Rajwinder Barr RPH

## 2024-05-30 NOTE — PROGRESS NOTES
Occupational Therapy      05/30/24 3430   Appointment Info   Signing Clinician's Name / Credentials (OT) JACKI Sosa   Living Environment   People in Home alone   Current Living Arrangements house   Home Accessibility wheelchair accessible;stairs within home  (ramp to get inside  )   Number of Stairs, Within Home, Primary greater than 10 stairs   Stair Railings, Within Home, Primary railings safe and in good condition   Living Environment Comments Pt lives on one level, daughter comes over to do laundry in the basement   Self-Care   Usual Activity Tolerance moderate   Current Activity Tolerance fair   Equipment Currently Used at Home walker, standard;wheelchair, manual;grab bar, tub/shower;shower chair;grab bar, toilet   Fall history within last six months yes   Number of times patient has fallen within last six months 3   Activity/Exercise/Self-Care Comment Pts dtr helps with anything pt needs, pt dresses toilets and bathes independently at baseline   Instrumental Activities of Daily Living (IADL)   IADL Comments Pt recieves A for ADLs @ baseline   General Information   Onset of Illness/Injury or Date of Surgery 05/28/24   Referring Physician Michael Hernandez MD   Patient/Family Therapy Goal Statement (OT) none stated   Additional Occupational Profile Info/Pertinent History of Current Problem Julisa Chaney is a 79 year old female with a medical history significant for collagenous colitis, seizure disorder, hyperlipidemia, chronic pain syndrome, GERD, trigeminal neuralgia and other medical co morbidities who was admitted with sepsis due to multifocal pneumonia and pyelonephritis/UTI.   Existing Precautions/Restrictions fall   Cognitive Status Examination   Orientation Status orientation to person, place and time   Affect/Mental Status (Cognitive) WFL   Follows Commands WFL   Visual Perception   Visual Impairment/Limitations WFL   Pain Assessment   Patient Currently in Pain Yes, see Vital Sign flowsheet    Range of Motion Comprehensive   General Range of Motion no range of motion deficits identified   Strength Comprehensive (MMT)   General Manual Muscle Testing (MMT) Assessment no strength deficits identified   Bed Mobility   Bed Mobility supine-sit;sit-supine   Supine-Sit [place] (Bed Mobility) supervision;verbal cues   Sit-Supine [place] (Bed Mobility) supervision   Assistive Device (Bed Mobility) bed rails   Comment (Bed Mobility) elevated HOB   Transfers   Transfers sit-stand transfer   Sit-Stand Transfer   Sit-Stand [place] (Transfers) contact guard;2 person assist   Balance   Balance Assessment sitting static balance;standing static balance   Sitting Balance: Static supervision   Position, Sitting Balance sitting edge of bed   Standing Balance: Static 2-person assist;contact guard   Position/Device Used, Standing Balance other (see comments)  (hand in hand)   Activities of Daily Living   BADL Assessment/Intervention lower body dressing   Lower Body Dressing Assessment/Training   Position (Lower Body Dressing) edge of bed sitting   Comment, (Lower Body Dressing) figure 4   [place] Level (Lower Body Dressing) don;doff;socks;supervision   Clinical Impression   Criteria for Skilled Therapeutic Interventions Met (OT) Yes, treatment indicated   OT Diagnosis weakness impacting functional mobility   OT Problem List-Impairments impacting ADL problems related to;activity tolerance impaired;mobility;pain;strength   Assessment of Occupational Performance 1-3 Performance Deficits   Identified Performance Deficits trsfrs, toilet trsfr, standing balance for ADLs   Planned Therapy Interventions (OT) ADL retraining;transfer training   Clinical Decision Making Complexity (OT) problem focused assessment/low complexity   Risk & Benefits of therapy have been explained evaluation/treatment results reviewed;care plan/treatment goals reviewed   OT Total Evaluation Time   OT Eval, Low Complexity Minutes (15184) 8    OT Goals   Therapy Frequency (OT) Daily   OT Predicted Duration/Target Date for Goal Attainment 06/06/24   OT Goals Transfers;Toilet Transfer/Toileting;Hygiene/Grooming   OT: Hygiene/Grooming supervision/stand-by assist;while standing   OT: Transfer Supervision/stand-by assist;with assistive device   OT: Toilet Transfer/Toileting Supervision/stand-by assist;using adaptive equipment   Interventions   Interventions Quick Adds Therapeutic Activity;Therapeutic Procedures/Exercise   Therapeutic Procedures/Exercise   Therapeutic Procedure: strength, endurance, ROM, flexibillity minutes (47298) 8   Symptoms Noted During/After Treatment fatigue;increased pain   Treatment Detail/Skilled Intervention Eval completed, intervention started. Pt seated EOB. Pt sit/stand w/ CGA x2/hand in hand support & vocal cues for safe body mechanics. Pt static stand, ~30 sec w/ CGA x2. Pt stand/sit on EOB w/ CGA x2, vocal cues for safe body mechanics. Pt completed 3x UB exercises, 10 reps/exercise w/ ~45 sec rest btwn reps. O2 noted to decrease to ~85% while completing exercises. Educated on PLB - implimented. Pt O2 return to ~92% during seated rest breaks.   Therapeutic Activities   Therapeutic Activity Minutes (61054) 8   Symptoms noted during/after treatment increased pain;fatigue   Treatment Detail/Skilled Intervention Eval completed, intervention started. Pt seated EOB following therapeutic exercise. Pt sit/stand w/ CGA x2, vocal cues for safe body mechanics, & FWW. Pt pivot trsfr to recliner w/ CGA x2, FWW, vocal cues for safe body mechanics, & increased time. Pt stand/sit in recliner w/ CGA x2, FWW, & vocal cues for safe body mechanics. Pt seated rest break, ~3 min. Pt requested return to bed. Pt sit/stand w/ min x2, FWW, & vocal cues for safe body mechanics. Pt pivot trsfr to EOB w/ CGA x2, FWW, & increased time. Pt stand/sit on EOB w/ CGA x2, FWW, & vocal cues for safe body mechanics. Pt supine in bed, bed alarm activated & call  light w/in reach.   OT Discharge Planning   OT Plan trsfrs, in-room ambulation, standing g/h?   OT Discharge Recommendation (DC Rec) Transitional Care Facility   OT Rationale for DC Rec Pt lives alone and receives A w/ ADLs on as-needed basis @ baseline. Pt currently limited to pivot trsfrs d/t pain & fatigue. Recommend TCU to increase functional strength/endurance for ADLs prior to return home.   OT Brief overview of current status CGA x2   Total Session Time   Timed Code Treatment Minutes 16   Total Session Time (sum of timed and untimed services) 24

## 2024-05-30 NOTE — PLAN OF CARE
Problem: Adult Inpatient Plan of Care  Goal: Optimal Comfort and Wellbeing  Outcome: Progressing     Problem: Pain Acute  Goal: Optimal Pain Control and Function  Outcome: Progressing     Problem: Gas Exchange Impaired  Goal: Optimal Gas Exchange  Outcome: Progressing     Problem: Pneumonia  Goal: Effective Oxygenation and Ventilation  Outcome: Progressing     Pt A/Ox4. Reported no pain control with PRN tylenol #3, Dr. Hernandez notified. New orders noted for a one time dose of oxycodone and a pain management consult. Per pt, oxycodone had very minimal relief. Pt noted to have large black stool at 1700, MD notified and new orders noted for hemoglobin draw and occult stool lab collection. Hemoglobin 11.1. Specimen collection items at pts bedside.

## 2024-05-30 NOTE — PLAN OF CARE
Problem: Pain Acute  Goal: Optimal Pain Control and Function  Outcome: Progressing  Intervention: Develop Pain Management Plan  Recent Flowsheet Documentation  Taken 5/29/2024 1639 by Emy Lyon RN  Pain Management Interventions: medication offered but refused  Intervention: Prevent or Manage Pain  Recent Flowsheet Documentation  Taken 5/29/2024 1642 by Emy Lyon RN  Medication Review/Management: medications reviewed  Intervention: Optimize Psychosocial Wellbeing  Recent Flowsheet Documentation  Taken 5/29/2024 1642 by Emy Lyon RN  Supportive Measures:   active listening utilized   decision-making supported     Problem: Fall Injury Risk  Goal: Absence of Fall and Fall-Related Injury  Intervention: Identify and Manage Contributors  Recent Flowsheet Documentation  Taken 5/29/2024 1642 by Emy Lyon RN  Medication Review/Management: medications reviewed  Intervention: Promote Injury-Free Environment  Recent Flowsheet Documentation  Taken 5/29/2024 1642 by Emy Lyon RN  Safety Promotion/Fall Prevention:   clutter free environment maintained   increased rounding and observation   lighting adjusted   mobility aid in reach   nonskid shoes/slippers when out of bed   patient and family education   room near nurse's station   room organization consistent   safety round/check completed     Problem: Pneumonia  Goal: Fluid Balance  Outcome: Progressing  Goal: Resolution of Infection Signs and Symptoms  Intervention: Prevent Infection Progression  Recent Flowsheet Documentation  Taken 5/29/2024 1642 by Emy Lyon RN  Isolation Precautions: contact precautions initiated  Goal: Effective Oxygenation and Ventilation  Intervention: Promote Airway Secretion Clearance  Recent Flowsheet Documentation  Taken 5/29/2024 1642 by Emy Lyon RN  Cough And Deep Breathing: done independently per patient  Intervention: Optimize Oxygenation and  Ventilation  Recent Flowsheet Documentation  Taken 5/29/2024 1642 by Emy Lyon, RN  Head of Bed (HOB) Positioning: HOB at 20-30 degrees   Goal Outcome Evaluation:       Pt is alert and oriented x 4, c/o pain in the left face, Tylenol # 3 given with minimal relief. Pt verbalized pain worsen when she talks. Fine crackles noted in the both lung fields. On RA. SpO2 94 to 96%.  HR in the 50's, NSR. Generalized skin rashes and redness noted in the body. Pt denies itchiness. Pt is on clear, liquids, refused to use thickener, follows instructions on how to swallow pills with water. On LR at 75 ml/hr. Pt is incontinent with bladder, had one episode at the start of the shift. Bladder scan done , showed 223 ml. SCD in place. Pt is wheelchair bound.

## 2024-05-30 NOTE — PLAN OF CARE
Problem: Fall Injury Risk  Goal: Absence of Fall and Fall-Related Injury  Outcome: Progressing  Intervention: Identify and Manage Contributors  Recent Flowsheet Documentation  Taken 5/30/2024 0406 by Soco Collins RN  Medication Review/Management: medications reviewed  Taken 5/30/2024 0000 by Soco Collins RN  Medication Review/Management: medications reviewed  Taken 5/29/2024 1940 by Soco Collins RN  Medication Review/Management: medications reviewed  Intervention: Promote Injury-Free Environment  Recent Flowsheet Documentation  Taken 5/30/2024 0406 by Soco Collins RN  Safety Promotion/Fall Prevention:   clutter free environment maintained   increased rounding and observation   lighting adjusted   mobility aid in reach   nonskid shoes/slippers when out of bed   patient and family education   room near nurse's station   room organization consistent   safety round/check completed  Taken 5/30/2024 0000 by Soco Collins RN  Safety Promotion/Fall Prevention:   clutter free environment maintained   increased rounding and observation   lighting adjusted   mobility aid in reach   nonskid shoes/slippers when out of bed   patient and family education   room near nurse's station   room organization consistent   safety round/check completed  Taken 5/29/2024 1940 by Soco Collins RN  Safety Promotion/Fall Prevention:   clutter free environment maintained   increased rounding and observation   lighting adjusted   mobility aid in reach   nonskid shoes/slippers when out of bed   patient and family education   room near nurse's station   room organization consistent   safety round/check completed     Problem: Gas Exchange Impaired  Goal: Optimal Gas Exchange  Outcome: Progressing  Intervention: Optimize Oxygenation and Ventilation  Recent Flowsheet Documentation  Taken 5/30/2024 0406 by Soco Collins RN  Head of Bed (HOB) Positioning: HOB at 20-30 degrees  Taken 5/30/2024 0000 by  Soco Collins RN  Head of Bed ([facility]) Positioning: HOB at 20-30 degrees  Taken 5/29/2024 1940 by Soco Collins RN  Head of Bed ([facility]) Positioning: HOB at 20-30 degrees     Problem: Pneumonia  Goal: Fluid Balance  Outcome: Progressing  Goal: Resolution of Infection Signs and Symptoms  Outcome: Progressing  Intervention: Prevent Infection Progression  Recent Flowsheet Documentation  Taken 5/30/2024 0406 by Soco Collins RN  Isolation Precautions: contact precautions initiated  Taken 5/30/2024 0000 by Soco Collins RN  Isolation Precautions: contact precautions initiated  Taken 5/29/2024 1940 by Soco Collins RN  Isolation Precautions: contact precautions initiated  Goal: Effective Oxygenation and Ventilation  Outcome: Progressing  Intervention: Promote Airway Secretion Clearance  Recent Flowsheet Documentation  Taken 5/30/2024 0406 by Soco Collins RN  Cough And Deep Breathing: done independently per patient  Taken 5/30/2024 0000 by Soco Collins RN  Cough And Deep Breathing: done independently per patient  Taken 5/29/2024 1940 by Soco Collins RN  Cough And Deep Breathing: done independently per patient  Intervention: Optimize Oxygenation and Ventilation  Recent Flowsheet Documentation  Taken 5/30/2024 0406 by Soco Collins RN  Head of Bed ([facility]) Positioning: HOB at 20-30 degrees  Taken 5/30/2024 0000 by Soco Collins RN  Head of Bed ([facility]) Positioning: HOB at 20-30 degrees  Taken 5/29/2024 1940 by Soco Collins RN  Head of Bed ([facility]) Positioning: HOB at 20-30 degrees     Goal Outcome Evaluation:    Pt is A0x4, denies SOB, denies itchiness from generalized rashes. On RA. Sinus Wallace on tele, crackles in Bilateral anterior lungs. Assist x 1 with gait belt. Good UOP, No BM. Takes pills whole with apple sauce.LR running @ 75ml/hr.SCDs worn for the most part of shift but patient requested to have it off around 0400.

## 2024-05-30 NOTE — PLAN OF CARE
"  Patient is A/O, BP has improved to 100s systolic.  Patient has had consistent 7-8 out of 10 pain at her left trigeminal nerve.  PRNs are given, quiet environment facilitated, MD notified.  Patient refused her MRI procedure that was planned today, and it was discontinued.  Patient had a BM at bedside commode this shift.   Anticipated discharge 1 to 2 days at this time    Tele: NSR with times of que that recovers    Problem: Adult Inpatient Plan of Care  Goal: Plan of Care Review  Description: The Plan of Care Review/Shift note should be completed every shift.  The Outcome Evaluation is a brief statement about your assessment that the patient is improving, declining, or no change.  This information will be displayed automatically on your shift  note.  Outcome: Progressing  Goal: Patient-Specific Goal (Individualized)  Description: You can add care plan individualizations to a care plan. Examples of Individualization might be:  \"Parent requests to be called daily at 9am for status\", \"I have a hard time hearing out of my right ear\", or \"Do not touch me to wake me up as it startles  me\".  Outcome: Progressing  Goal: Absence of Hospital-Acquired Illness or Injury  Outcome: Progressing  Intervention: Identify and Manage Fall Risk  Recent Flowsheet Documentation  Taken 5/30/2024 1058 by Ed Car, RN  Safety Promotion/Fall Prevention:   clutter free environment maintained   increased rounding and observation   lighting adjusted   mobility aid in reach   nonskid shoes/slippers when out of bed   patient and family education   room near nurse's station   room organization consistent   safety round/check completed  Taken 5/30/2024 0907 by Ed Car, RN  Safety Promotion/Fall Prevention:   clutter free environment maintained   increased rounding and observation   lighting adjusted   mobility aid in reach   nonskid shoes/slippers when out of bed   patient and family education   room near nurse's station   room " organization consistent   safety round/check completed  Intervention: Prevent and Manage VTE (Venous Thromboembolism) Risk  Recent Flowsheet Documentation  Taken 5/30/2024 1058 by Ed Car RN  VTE Prevention/Management: SCDs (sequential compression devices) off  Taken 5/30/2024 0907 by Ed Car, RN  VTE Prevention/Management: SCDs (sequential compression devices) off  Intervention: Prevent Infection  Recent Flowsheet Documentation  Taken 5/30/2024 1058 by Ed Car, RN  Infection Prevention:   hand hygiene promoted   equipment surfaces disinfected   environmental surveillance performed  Taken 5/30/2024 0907 by Ed Car RN  Infection Prevention:   hand hygiene promoted   equipment surfaces disinfected   environmental surveillance performed  Goal: Optimal Comfort and Wellbeing  Outcome: Progressing  Intervention: Monitor Pain and Promote Comfort  Recent Flowsheet Documentation  Taken 5/30/2024 1052 by Ed Car, RN  Pain Management Interventions:   quiet environment facilitated   medication (see MAR)  Taken 5/30/2024 0929 by Ed Car, RN  Pain Management Interventions: quiet environment facilitated  Taken 5/30/2024 0907 by Ed Car, RN  Pain Management Interventions: medication (see MAR)    Geoff Car RN

## 2024-05-30 NOTE — PROGRESS NOTES
SPIRITUAL HEALTH SERVICES Note     Saw pt Julisa Chaney and administered Voodoo sacrament of anointing for the healing of the sick.    Fr. Moisés Mark

## 2024-05-30 NOTE — PROGRESS NOTES
[facility]    PROGRESS NOTE - Hospitalist Service    Assessment and Plan  Patient is new to me, Julisa Chaney is a 79 year old female with a past medical  history of collagenous colitis, seizure disorder, hyperlipidemia, chronic pain syndrome, GERD, trigeminal neuralgia who presented to the emergency room for the evaluation of weakness and was admitted with sepsis due to multifocal pneumonia and pyelonephritis/UTI.        Sepsis  - due to  and/or respiratory infection.    - Patient covered broadly on cefepime, vancomycin and azithromycin then meropenem.    - Patient with history of MRSA and is + on follow up testing.    - Switched to Vanco Levaquin due what looks like a drug rash overnight.  -   - Follow-up on blood and urine cultures.    - Gentle hydration.    - Start downgrade antibiotic tomorrow if blood culture remains negative    Bilateral multifocal pneumonia  -community-acquired pneumonia versus aspiration pneumonia.    - Patient currently on levaquin and vanco due to drug rash with original antibiotics.    - Optimize lung cares with breathing treatments  - Wean off oxygen as tolerated     UTI with pyelonephritis   - Urine culture grew E. coli, pending sensitivity  - continue current abx     Gastritis/GERD.    -- Start PPI     Constipation   -MiraLAX daily.     Collagenous colitis  -noted patient's is always between diarrhea and constipation.     Trigeminal neuralgia-  -patient has chronic pain.    -Continue Neurontin     Moderate malnutrition with failure to thrive  - progressive weakness, falls, forgetfulness, low blood pressure, muscle weakness.   - Elevated cortisol, normal TSH.   - MRI from 7/30/2022 with ventriculitis and focal abscesses.    -  recheck MRI of the brain but patient declined.  - Patient reports about 20 to 30 pound weight loss,   -Nutritional consult, appreciate input.    Chronic pain syndrome  -pain control as needed     Weakness  -PT OT and   evaluate       50 MINUTES SPENT BY ME on the date of service doing chart review, history, exam, documentation & further activities per the note    Active Problems:    Sepsis (H)    Acute UTI    Bilateral pneumonia      VTE prophylaxis:  Enoxaparin (Lovenox) SQ  DIET: Orders Placed This Encounter      Mechanical/Dental Soft Diet      Disposition/Barriers to discharge: IV antibiotic, pending culture, PT/OT evaluation  Code Status: Full Code    Subjective:  Julisa is feeling slightly better today, still has pain in her face on and off.  Tolerating liquid diet and requesting advancing diet.    PHYSICAL EXAM  Vitals:    05/28/24 1858 05/29/24 0947 05/30/24 0406   Weight: 40.8 kg (90 lb) 46.8 kg (103 lb 3.2 oz) 46.5 kg (102 lb 8.2 oz)     B/P:106/55 T:98.1 P:84 R:18     Intake/Output Summary (Last 24 hours) at 5/30/2024 1422  Last data filed at 5/30/2024 1323  Gross per 24 hour   Intake 3020 ml   Output 1850 ml   Net 1170 ml      Body mass index is 17.06 kg/m .    Constitutional: awake, alert, cooperative, no apparent distress, and appears stated age  Respiratory: No increased work of breathing, good air exchange, clear to auscultation bilaterally, no crackles or wheezing  Cardiovascular: Normal apical impulse, regular rate and rhythm, normal S1 and S2, no S3 or S4, and no murmur noted  GI: No scars, normal bowel sounds, soft, non-distended, non-tender, no masses palpated, no hepatosplenomegally  Skin: no bruising or bleeding and normal skin color, texture, turgor  Musculoskeletal: There is no redness, warmth, or swelling of the joints.  Full range of motion noted.  no lower extremity pitting edema present  Neurologic: Awake, alert, oriented to name, place and time.  Cranial nerves II-XII are grossly intact.  Motor is 5 out of 5 bilaterally.   Sensory is intact.    Neuropsychiatric: Appropriate with examiner      PERTINENT LABS/IMAGING:    I have personally reviewed the following data over the past 24 hrs:    9.0  \    10.8 (L)   / 251     142 111 (H) 6.9 (L) /  84   3.4 24 0.52 \       Imaging results reviewed over the past 24 hrs:   No results found for this or any previous visit (from the past 24 hour(s)).    Discussed with patient, family, nursing staff and discharge planner    Michael Hernandez MD  [facility] Medicine Service  [phone]

## 2024-05-30 NOTE — PROGRESS NOTES
05/30/24 6561   Appointment Info   Signing Clinician's Name / Credentials (PT) CATHERINE Long   Rehab Comments (PT) Co-eval with OT   Living Environment   People in Home alone   Current Living Arrangements house   Home Accessibility wheelchair accessible;stairs within home  (ramp to get inside  )   Number of Stairs, Within Home, Primary greater than 10 stairs   Stair Railings, Within Home, Primary railings safe and in good condition   Living Environment Comments Pt lives on one level, daughter comes over to do laundry in the basement   Self-Care   Usual Activity Tolerance moderate   Current Activity Tolerance fair   Equipment Currently Used at Home walker, standard;wheelchair, manual;grab bar, tub/shower;shower chair;grab bar, toilet   Fall history within last six months yes   Number of times patient has fallen within last six months 3   Activity/Exercise/Self-Care Comment Pts dtr helps with anything pt needs, pt dresses toilets and bathes independently at baseline   General Information   Onset of Illness/Injury or Date of Surgery 05/28/24   Referring Physician Michael Hernandez MD   Patient/Family Therapy Goals Statement (PT) none stated   Pertinent History of Current Problem (include personal factors and/or comorbidities that impact the POC) 79 year old female with a medical history significant for collagenous colitis, seizure disorder, hyperlipidemia, chronic pain syndrome, GERD, trigeminal neuralgia and other medical co morbidities who was admitted with sepsis due to multifocal pneumonia and pyelonephritis.     Patient was seen in the ER on admission.  She was oriented to place person and time and could provide a history.  Per history, today at 6:00 the patient was noted to have assisted the patient to make dinner and give her medications.  The patient had no eating anything prior to her daughter visiting.  She reports that generalized weakness, tremors, facial pain fevers and mid abdominal pain and she  was brought to the ER.  Additionally the patient is reported not to have had a bowel movement in over a week.   Existing Precautions/Restrictions (S)  fall   Posture    Posture Kyphosis;Scoliosis   Range of Motion (ROM)   Range of Motion ROM deficits secondary to weakness   Strength (Manual Muscle Testing)   Strength (Manual Muscle Testing) Deficits observed during functional mobility   Bed Mobility   Bed Mobility supine-sit   Supine-Sit [place] (Bed Mobility) contact guard;verbal cues   Bed Mobility Limitations impaired ability to control trunk for mobility   Impairments Contributing to Impaired Bed Mobility decreased strength   Assistive Device (Bed Mobility) bed rails   Transfers   Transfers sit-stand transfer   Transfer Safety Concerns Noted losing balance backward   Impairments Contributing to Impaired Transfers impaired balance;pain;decreased strength   Sit-Stand Transfer   Sit-Stand [place] (Transfers) contact guard;2 person assist   Assistive Device (Sit-Stand Transfers) other (see comments)  (none)   Gait/Stairs (Locomotion)   [place] Level (Gait) not tested;unable to assess   Comment, (Gait/Stairs) pt currently not able to ambulate a functional distance   Clinical Impression   Criteria for Skilled Therapeutic Intervention Yes, treatment indicated   PT Diagnosis (PT) impaired functional mobility   Influenced by the following impairments decreased strength, decreased activity tolerance   Functional limitations due to impairments bed mobility, transfers, gait   Clinical Presentation (PT Evaluation Complexity) stable   Clinical Presentation Rationale pt presents as medically diagnosed   Clinical Decision Making (Complexity) low complexity   Planned Therapy Interventions (PT) balance training;bed mobility training;gait training;home exercise program;neuromuscular re-education;patient/family education;ROM (range of motion);strengthening;stretching;transfer training   Risk & Benefits of therapy  have been explained patient   PT Total Evaluation Time   PT Radhaal, Low Complexity Minutes (75171) 8   Physical Therapy Goals   PT Frequency 5x/week   PT Predicted Duration/Target Date for Goal Attainment 06/06/24   PT Goals Bed Mobility;Transfers;Gait   PT: Bed Mobility Modified independent;Supine to/from sit   PT: Transfers Modified independent;Sit to/from stand;Assistive device   PT: Gait Minimal assist;Rolling walker;10 feet   Interventions   Interventions Quick Adds Therapeutic Activity   Therapeutic Activity   Therapeutic Activities: dynamic activities to improve functional performance Minutes (16752) 23   Symptoms Noted During/After Treatment Dizziness   Treatment Detail/Skilled Intervention pt completed 3 additional sit <> stands throughout, use of FWW, CGA. V/c for hand placement and to continue taking deep breaths, O2 sats dropped to mid 70s with activity, able to get back to 90's with cueing for PLB. Pt able to amb to chair, CGA x2, with FWW, v/c to stand up straight. After sitting for 2 min, pt wanted to return to bed, CGAx2 with FWW. V/c to use PLB due to O2 sats dropping. Sit > supine, CGA with v/c for technique. Boosted to HOB with draw sheet, maxA x2. Pt in bed, alarm on, call llight in reach.   PT Discharge Planning   PT Plan Transfers from various heights, amb with chair follow as tolerated, LE strengthening   PT Discharge Recommendation (DC Rec) Transitional Care Facility   PT Rationale for DC Rec Pt would benefit from further therapies to improve strength and mobiltiy   PT Brief overview of current status x4 sit <> stands CGA with FWW, bed <> chair CGAx2 with FWW, supine <> sit CGA with v/c. V/c throughout for pursed lip breathing.   PT Equipment Needed at Discharge walker, rolling   Total Session Time   Timed Code Treatment Minutes 23   Total Session Time (sum of timed and untimed services) 31      Mercedes Flap Text: The defect edges were debeveled with a #15 scalpel blade.  Given the location of the defect, shape of the defect and the proximity to free margins a Mercedes flap was deemed most appropriate.  Using a sterile surgical marker, an appropriate advancement flap was drawn incorporating the defect and placing the expected incisions within the relaxed skin tension lines where possible. The area thus outlined was incised deep to adipose tissue with a #15 scalpel blade.  The skin margins were undermined to an appropriate distance in all directions utilizing iris scissors.

## 2024-05-30 NOTE — PROGRESS NOTES
"Care Management Follow Up    Length of Stay (days): 1    Expected Discharge Date: 05/31/2024     Concerns to be Addressed:     Care progression   Patient plan of care discussed at interdisciplinary rounds: Yes    Anticipated Discharge Disposition:  TBD     Anticipated Discharge Services:  TBD  Anticipated Discharge DME:  NA    Patient/family educated on Medicare website which has current facility and service quality ratings:  NA  Education Provided on the Discharge Plan:  Yes per team  Patient/Family in Agreement with the Plan:  NA    Referrals Placed by CM/SW:  NA  Private pay costs discussed: Not applicable    Additional Information:  Per provider, Dr. Hernandez, patient is not ready. Maybe here 1-2 days.    Social Hx: \"Live alone and ind w/WC, dtr Alissa to transport. Recent admit 4/9-4/12 at [facility] Dc'd to [facility] 4/12-5/6 and then home w/homecare. Pt denies homecare svcs.\"    RNCM to follow for medical progression, recommendations, and final discharge plan.     Carol Merchant RN     "

## 2024-05-31 ENCOUNTER — APPOINTMENT (OUTPATIENT)
Dept: OCCUPATIONAL THERAPY | Facility: HOSPITAL | Age: 79
DRG: 871 | End: 2024-05-31
Payer: COMMERCIAL

## 2024-05-31 ENCOUNTER — APPOINTMENT (OUTPATIENT)
Dept: PHYSICAL THERAPY | Facility: HOSPITAL | Age: 79
DRG: 871 | End: 2024-05-31
Payer: COMMERCIAL

## 2024-05-31 LAB
ALBUMIN SERPL BCG-MCNC: 2.8 G/DL (ref 3.5–5.2)
ALP SERPL-CCNC: 86 U/L (ref 40–150)
ALT SERPL W P-5'-P-CCNC: 17 U/L (ref 0–50)
ANION GAP SERPL CALCULATED.3IONS-SCNC: 7 MMOL/L (ref 7–15)
AST SERPL W P-5'-P-CCNC: 11 U/L (ref 0–45)
ATRIAL RATE - MUSE: 110 BPM
BILIRUB SERPL-MCNC: 0.2 MG/DL
BUN SERPL-MCNC: 5.5 MG/DL (ref 8–23)
CALCIUM SERPL-MCNC: 8.3 MG/DL (ref 8.8–10.2)
CHLORIDE SERPL-SCNC: 106 MMOL/L (ref 98–107)
CREAT SERPL-MCNC: 0.52 MG/DL (ref 0.51–0.95)
DEPRECATED HCO3 PLAS-SCNC: 28 MMOL/L (ref 22–29)
DIASTOLIC BLOOD PRESSURE - MUSE: NORMAL MMHG
EGFRCR SERPLBLD CKD-EPI 2021: >90 ML/MIN/1.73M2
ERYTHROCYTE [DISTWIDTH] IN BLOOD BY AUTOMATED COUNT: 16.2 % (ref 10–15)
GLUCOSE SERPL-MCNC: 82 MG/DL (ref 70–99)
HCT VFR BLD AUTO: 35.9 % (ref 35–47)
HGB BLD-MCNC: 11 G/DL (ref 11.7–15.7)
INTERPRETATION ECG - MUSE: NORMAL
MAGNESIUM SERPL-MCNC: 1.6 MG/DL (ref 1.7–2.3)
MCH RBC QN AUTO: 26.8 PG (ref 26.5–33)
MCHC RBC AUTO-ENTMCNC: 30.6 G/DL (ref 31.5–36.5)
MCV RBC AUTO: 88 FL (ref 78–100)
P AXIS - MUSE: 17 DEGREES
PLATELET # BLD AUTO: 298 10E3/UL (ref 150–450)
POTASSIUM SERPL-SCNC: 2.9 MMOL/L (ref 3.4–5.3)
POTASSIUM SERPL-SCNC: 3.5 MMOL/L (ref 3.4–5.3)
PR INTERVAL - MUSE: 142 MS
PROCALCITONIN SERPL IA-MCNC: 0.25 NG/ML
PROT SERPL-MCNC: 5.2 G/DL (ref 6.4–8.3)
QRS DURATION - MUSE: 62 MS
QT - MUSE: 288 MS
QTC - MUSE: 389 MS
R AXIS - MUSE: 41 DEGREES
RBC # BLD AUTO: 4.1 10E6/UL (ref 3.8–5.2)
SODIUM SERPL-SCNC: 141 MMOL/L (ref 135–145)
SYSTOLIC BLOOD PRESSURE - MUSE: NORMAL MMHG
T AXIS - MUSE: 50 DEGREES
VENTRICULAR RATE- MUSE: 110 BPM
WBC # BLD AUTO: 6.5 10E3/UL (ref 4–11)

## 2024-05-31 PROCEDURE — 84132 ASSAY OF SERUM POTASSIUM: CPT | Performed by: INTERNAL MEDICINE

## 2024-05-31 PROCEDURE — 85014 HEMATOCRIT: CPT | Performed by: INTERNAL MEDICINE

## 2024-05-31 PROCEDURE — 250N000013 HC RX MED GY IP 250 OP 250 PS 637: Performed by: INTERNAL MEDICINE

## 2024-05-31 PROCEDURE — 97116 GAIT TRAINING THERAPY: CPT | Mod: GP

## 2024-05-31 PROCEDURE — 84145 PROCALCITONIN (PCT): CPT | Performed by: INTERNAL MEDICINE

## 2024-05-31 PROCEDURE — 36415 COLL VENOUS BLD VENIPUNCTURE: CPT | Performed by: INTERNAL MEDICINE

## 2024-05-31 PROCEDURE — 250N000011 HC RX IP 250 OP 636: Performed by: INTERNAL MEDICINE

## 2024-05-31 PROCEDURE — 250N000013 HC RX MED GY IP 250 OP 250 PS 637: Performed by: FAMILY MEDICINE

## 2024-05-31 PROCEDURE — 258N000003 HC RX IP 258 OP 636: Performed by: FAMILY MEDICINE

## 2024-05-31 PROCEDURE — 82040 ASSAY OF SERUM ALBUMIN: CPT | Performed by: INTERNAL MEDICINE

## 2024-05-31 PROCEDURE — 250N000011 HC RX IP 250 OP 636: Performed by: FAMILY MEDICINE

## 2024-05-31 PROCEDURE — 258N000003 HC RX IP 258 OP 636: Performed by: INTERNAL MEDICINE

## 2024-05-31 PROCEDURE — 97530 THERAPEUTIC ACTIVITIES: CPT | Mod: GP

## 2024-05-31 PROCEDURE — 99232 SBSQ HOSP IP/OBS MODERATE 35: CPT | Performed by: INTERNAL MEDICINE

## 2024-05-31 PROCEDURE — 97535 SELF CARE MNGMENT TRAINING: CPT | Mod: GO

## 2024-05-31 PROCEDURE — 120N000004 HC R&B MS OVERFLOW

## 2024-05-31 PROCEDURE — 83735 ASSAY OF MAGNESIUM: CPT | Performed by: INTERNAL MEDICINE

## 2024-05-31 RX ORDER — MAGNESIUM OXIDE 400 MG/1
400 TABLET ORAL EVERY 4 HOURS
Qty: 2 TABLET | Refills: 0 | Status: COMPLETED | OUTPATIENT
Start: 2024-05-31 | End: 2024-05-31

## 2024-05-31 RX ORDER — ACETAMINOPHEN AND CODEINE PHOSPHATE 300; 30 MG/1; MG/1
1 TABLET ORAL EVERY 4 HOURS PRN
Status: DISCONTINUED | OUTPATIENT
Start: 2024-05-31 | End: 2024-06-03 | Stop reason: HOSPADM

## 2024-05-31 RX ORDER — POTASSIUM CHLORIDE 1500 MG/1
40 TABLET, EXTENDED RELEASE ORAL ONCE
Qty: 2 TABLET | Refills: 0 | Status: COMPLETED | OUTPATIENT
Start: 2024-05-31 | End: 2024-05-31

## 2024-05-31 RX ORDER — POTASSIUM CHLORIDE 750 MG/1
10 TABLET, EXTENDED RELEASE ORAL ONCE
Status: COMPLETED | OUTPATIENT
Start: 2024-05-31 | End: 2024-05-31

## 2024-05-31 RX ADMIN — POTASSIUM CHLORIDE 10 MEQ: 750 TABLET, EXTENDED RELEASE ORAL at 16:12

## 2024-05-31 RX ADMIN — MIRTAZAPINE 15 MG: 7.5 TABLET, FILM COATED ORAL at 21:04

## 2024-05-31 RX ADMIN — NORTRIPTYLINE HYDROCHLORIDE 50 MG: 50 CAPSULE ORAL at 21:01

## 2024-05-31 RX ADMIN — GABAPENTIN 100 MG: 100 CAPSULE ORAL at 08:25

## 2024-05-31 RX ADMIN — SODIUM CHLORIDE 750 MG: 9 INJECTION, SOLUTION INTRAVENOUS at 17:47

## 2024-05-31 RX ADMIN — LEVETIRACETAM 750 MG: 250 TABLET, FILM COATED ORAL at 08:24

## 2024-05-31 RX ADMIN — OXCARBAZEPINE 450 MG: 150 TABLET, FILM COATED ORAL at 21:01

## 2024-05-31 RX ADMIN — MAGNESIUM OXIDE TAB 400 MG (241.3 MG ELEMENTAL MG) 400 MG: 400 (241.3 MG) TAB at 10:54

## 2024-05-31 RX ADMIN — SODIUM CHLORIDE, POTASSIUM CHLORIDE, SODIUM LACTATE AND CALCIUM CHLORIDE: 600; 310; 30; 20 INJECTION, SOLUTION INTRAVENOUS at 08:34

## 2024-05-31 RX ADMIN — ACETAMINOPHEN AND CODEINE PHOSPHATE 1 TABLET: 300; 30 TABLET ORAL at 16:12

## 2024-05-31 RX ADMIN — ACETAMINOPHEN AND CODEINE PHOSPHATE 1 TABLET: 300; 30 TABLET ORAL at 21:00

## 2024-05-31 RX ADMIN — SODIUM CHLORIDE 750 MG: 9 INJECTION, SOLUTION INTRAVENOUS at 06:49

## 2024-05-31 RX ADMIN — ACETAMINOPHEN AND CODEINE PHOSPHATE 1 TABLET: 300; 30 TABLET ORAL at 10:54

## 2024-05-31 RX ADMIN — ENOXAPARIN SODIUM 30 MG: 30 INJECTION SUBCUTANEOUS at 21:00

## 2024-05-31 RX ADMIN — LEVOFLOXACIN 500 MG: 500 TABLET, FILM COATED ORAL at 08:23

## 2024-05-31 RX ADMIN — BUDESONIDE 9 MG: 3 CAPSULE, GELATIN COATED ORAL at 08:25

## 2024-05-31 RX ADMIN — POTASSIUM CHLORIDE 40 MEQ: 1500 TABLET, EXTENDED RELEASE ORAL at 10:54

## 2024-05-31 RX ADMIN — ACETAMINOPHEN AND CODEINE PHOSPHATE 1 TABLET: 300; 30 TABLET ORAL at 04:13

## 2024-05-31 RX ADMIN — LEVETIRACETAM 750 MG: 250 TABLET, FILM COATED ORAL at 21:01

## 2024-05-31 RX ADMIN — PSYLLIUM HUSK 1 PACKET: 3.4 POWDER ORAL at 08:30

## 2024-05-31 RX ADMIN — MAGNESIUM OXIDE TAB 400 MG (241.3 MG ELEMENTAL MG) 400 MG: 400 (241.3 MG) TAB at 16:13

## 2024-05-31 RX ADMIN — GABAPENTIN 300 MG: 300 CAPSULE ORAL at 21:01

## 2024-05-31 RX ADMIN — PANTOPRAZOLE SODIUM 40 MG: 40 TABLET, DELAYED RELEASE ORAL at 06:41

## 2024-05-31 ASSESSMENT — ACTIVITIES OF DAILY LIVING (ADL)
ADLS_ACUITY_SCORE: 55
ADLS_ACUITY_SCORE: 56
ADLS_ACUITY_SCORE: 54
ADLS_ACUITY_SCORE: 54
ADLS_ACUITY_SCORE: 56
ADLS_ACUITY_SCORE: 54
ADLS_ACUITY_SCORE: 55
ADLS_ACUITY_SCORE: 54
ADLS_ACUITY_SCORE: 55
ADLS_ACUITY_SCORE: 54
ADLS_ACUITY_SCORE: 54
ADLS_ACUITY_SCORE: 55
ADLS_ACUITY_SCORE: 55
ADLS_ACUITY_SCORE: 54

## 2024-05-31 NOTE — PROGRESS NOTES
[facility] ACUTE PAIN SERVICE    ([facility], [facility], [facility], [facility])  Pain Progress Note    Assessment/Plan:  Julisa Chaney is a 79 year old female who was admitted on 5/28/2024.  Pain Service is asked by Dr. Hernandez, to see the patient for acute on chronic pain , trigeminal neuralgia..  Admitted for generalized weakness over past few days, hypoxia, febrile and tachycardic here.    History including trigeminal neuralgia, failure to thrive,  chronic pain, seizure disorder .Hx colitis, Hx aspiration pneumonia.  Patient is taking Keppra and Trileptal. Patient treated for aspiration pneumonia vs CAP, UTI.   shows consistent filling of Tylenol #3. Describes pain in left facial area at 7/10, but no longer with headache.  Patient feels the Tylenol #3 would be enough if getting consistently. Gabapentin home dose continued. Patient not sedated or confused, but did have to wake up patient to talk to her.  Patient denies constipation.     No changes recommended, discussed plan with HORACIO Walter, DNP, Acute Pain management Team    PLAN:  Acute pain secondary to chronic pain,  Multimodal Medication Therapy:   Adjuvants:  Tylenol 325 mg q 4 h prn, gabapentin 100 mg am, 300 mg q hs, nortriptyline 50 mg q hs, Remeron 15 mg q hs  Opioids: Tylenol #3 1 q 4 h prn  Non-medication interventions- Ice  Constipation Prophylaxis- Miralax daily, Metamucil one packet daily, senna/docusate 1-2 bid prn  Follow up /Discharge Recommendations - We recommend prescribing the following at the time of discharge:  will determine    [place]  reviewed on 5/31/24 05/07/24 gabapentin 100 mg #120(30 day supply)  04/19/.24 Tylenol #3 #60  04/13/24 Tylenol #3 #30  04/12/24 gabapentin 100 mg #30        <principal problem not specified>   Patient Active Problem List   Diagnosis    Osteopenia    Hyperlipidemia    Migraine headache    Trigeminal neuralgia    Counseling regarding advanced directives and goals of care     Controlled substance agreement signed 23    Hypercalcemia    Chronic pain syndrome    Iron deficiency anemia due to chronic blood loss    Abnormal chest CT    Mild major depression (H)    Pyloric ulcer, chronic    Pyogenic brain abscess    F11.2 - Continuous opioid dependence (H)    Malnutrition, unspecified type (H24)    Current mild episode of major depressive disorder without prior episode (H24)    Falls frequently    Hypokalemia    Failure to thrive in adult    Seizure disorder (H)    Chronic diarrhea    Collagenous colitis    Hypotension due to hypovolemia    Hypomagnesemia    Hypophosphatemia    Hypernatremia    Sepsis (H)    Acute UTI    Bilateral pneumonia        History   Drug Use No         Tobacco Use      Smoking status: Former        Packs/day: 0.00        Years: 1 pack/day for 50.0 years (50.0 ttl pk-yrs)        Types: Cigarettes        Start date: 1964        Quit date: 2014        Years since quittin.5      Smokeless tobacco: Never        Current Facility-Administered Medications   Medication Dose Route Frequency Provider Last Rate Last Admin    budesonide (ENTOCORT EC) EC capsule 9 mg  9 mg Oral QAM Glenys Buchanan MD   9 mg at 24 0825    enoxaparin ANTICOAGULANT (LOVENOX) injection 30 mg  30 mg Subcutaneous Q24H Tim Low MD   30 mg at 24    gabapentin (NEURONTIN) capsule 100 mg  100 mg Oral Daily Glenys Buchanan MD   100 mg at 24 0825    gabapentin (NEURONTIN) capsule 300 mg  300 mg Oral At Bedtime Glenys Buchanan MD   300 mg at 24    levETIRAcetam (KEPPRA) tablet 750 mg  750 mg Oral BID Glenys Buchanan MD   750 mg at 24 0824    levofloxacin (LEVAQUIN) tablet 500 mg  500 mg Oral Daily Tim Low MD   500 mg at 24 0823    magnesium oxide (MAG-OX) tablet 400 mg  400 mg Oral Q4H Michael Hernandez MD   400 mg at 24 1054    mirtazapine (REMERON) tablet TABS 15 mg  15 mg Oral At Bedtime Glenys Buchanan MD   15  mg at 05/30/24 2110    nortriptyline (PAMELOR) capsule 50 mg  50 mg Oral At Bedtime Glenys Buchanan MD   50 mg at 05/30/24 2110    OXcarbazepine (TRILEPTAL) tablet 450 mg  450 mg Oral At Bedtime Glenys Buchanan MD   450 mg at 05/30/24 2110    pantoprazole (PROTONIX) EC tablet 40 mg  40 mg Oral QAM AC Michael Hernandez MD   40 mg at 05/31/24 0641    polyethylene glycol (MIRALAX) Packet 17 g  17 g Oral Daily Michael Hernandez MD   17 g at 05/30/24 1534    psyllium (METAMUCIL/KONSYL) Packet 1 packet  1 packet Oral Daily Glenys Buchanan MD   1 packet at 05/31/24 0830    sodium chloride (PF) 0.9% PF flush 3 mL  3 mL Intracatheter Q8H Glenys Buchanan MD   3 mL at 05/31/24 0831    sodium chloride (PF) 0.9% PF flush 3 mL  3 mL Intracatheter Q8H Shaniqua White PA-C   3 mL at 05/31/24 0831    vancomycin (VANCOCIN) 750 mg in sodium chloride 0.9 % 250 mL intermittent infusion  750 mg Intravenous Q12H Tim Low MD   750 mg at 05/31/24 0649         Alice Bowles [facility]  Acute Inpatient Pain Team  M-F   Paging via My[employer] or [employer]

## 2024-05-31 NOTE — PLAN OF CARE
Assumed care 1900 to 0730. A&O x 4. Assist x 1 pivot to commode. Tele is NSR. C/O left sided facial pain, PRN Tylenol w/codeine given (see MAR). Room air. Patient takes pills whole in applesauce. Patient slept for the majority of the night. Up to bedside commode.  Call light within reach, able to make needs known. Bed alarm on for safety.    Problem: Pneumonia  Goal: Effective Oxygenation and Ventilation  Intervention: Optimize Oxygenation and Ventilation  Recent Flowsheet Documentation  Taken 5/31/2024 0413 by Vesta Hamilton RN  Head of Bed (HOB) Positioning: HOB at 20-30 degrees  Taken 5/31/2024 0000 by Vesta Hamilton RN  Head of Bed (HOB) Positioning: HOB at 20-30 degrees  Taken 5/30/2024 2110 by Vesta Hamilton RN  Head of Bed (HOB) Positioning: HOB at 20-30 degrees  Taken 5/30/2024 2041 by Vesta Hamilton RN  Head of Bed (HOB) Positioning: HOB at 30 degrees     Problem: Risk for Delirium  Goal: Improved Behavioral Control  Intervention: Prevent and Manage Agitation  Recent Flowsheet Documentation  Taken 5/31/2024 0413 by Vesta Hamilton RN  Environment Familiarity/Consistency: daily routine followed  Taken 5/31/2024 0000 by Vesta Hamilton RN  Environment Familiarity/Consistency: daily routine followed  Taken 5/30/2024 2041 by Vesta Hamilton RN  Environment Familiarity/Consistency: daily routine followed

## 2024-05-31 NOTE — PROGRESS NOTES
CLINICAL NUTRITION SERVICES NOTE    Diet: Mechanical/Dental soft, advanced yesterday  Supplements: Gel plus cherry with breakfast and dinner, apple Ensure clear with lunch  Intake: 0-75 %, pt ate 75% of breakfast but, declined lunch.  She says she likes Ensure Clear but, does not have want it.  She is okay with Gel plus 2 times daily.  Will change orders per pt preferences.

## 2024-05-31 NOTE — PROGRESS NOTES
"Care Management Follow Up    Length of Stay (days): 2    Expected Discharge Date: 06/01/2024     Concerns to be Addressed:     Care progression   Patient plan of care discussed at interdisciplinary rounds: Yes    Anticipated Discharge Disposition:  Transitional Care     Anticipated Discharge Services:  Transitional Care  Anticipated Discharge DME:  NA    Patient/family educated on Medicare website which has current facility and service quality ratings:  NA  Education Provided on the Discharge Plan:  Yes per team  Patient/Family in Agreement with the Plan:  NA    Referrals Placed by CM/SW:  NA  Private pay costs discussed: Not applicable    Additional Information:  Met with patient at bedside to discuss PT/OT discharge rec for Transitional Care. Patient is in agreement.  Writer provided a list of local skilled nursing facilities [facility] (which includes the medicare.gov website) for patient and family to review.   Patient will look through the list and let RNCM know.    Per provider, Dr. Hernandez, patient is not ready. On IV antibiotic and culture pending. Maybe ready tomorrow if has an accepting TCU.    Social Hx: \"Live alone, ind w/WC. Accepted to [facility]. PAS completed. Alesha wilde submitted, pending. Dtr Alissa to transport.\"    RNCM to follow for medical progression, recommendations, and final discharge plan.     Carol Merchant RN     1100 met with patient at bedside to get TCU choices. Patient said she want to return to the same TCU she was just discharge from: [facility].  She declined to give other choices.    Referral sent    1110 called and left a message for Hafsa with [facility] to screen patient and respond.    Alesha wilde submitted.    1420 called and spoke with Hafsa. Patient is currently being reviewed. The TCU pharmacy does not have in stock the budesonide 9mg EC 1 cap daily and they are wondering how to fill this medication? Is this a medication the patient will " "continue?    Message sent to Dr. Hernandez and Nicolle at [facility].    1431 rec'd message from Nicolle at [facility], \"Yes we have in stock in the d/c pharmacy.\"    1459 rec'd message from Dr. Hernandez, \"Yes she has to take.\"     Called and spoke with Hafsa [facility] pharmacy said it's an OTC medicine. They can order it. Can it be on held until they receive it? The TCU can get it within 24-48 hrs. Is it OK to miss 1-2 dose? Or we can fill the medication here prior to her d/c. [facility] has the budesonide 9mg EC in stock.     Message sent to Dr. Hernandez    [facility] if she is ready and the [employer] auth goes through.     Message sent to Dr. Hernandez    1527 rec'd message from Dr. Hernandez,  \"This is NOT otc medication, please check with our pharmacy.\"     Message sent to Nicolle Babb with [facility] confirmed they have budesonide 9mg EC in stock and \"this medication does require a script- provider can order electronic or call in a verbal order also. they're open until 6pm and take new scripts from the hospital up until about 5:15.\"  Patient can get this one medication filled here prior to discharge.     [id number]    [employer] auth pending clinical review ([id number])    Called and left a message for patient's daughter to return the call.    1615 patient's daughter returned call. Updates given.  "

## 2024-05-31 NOTE — PROGRESS NOTES
[facility]    PROGRESS NOTE - Hospitalist Service    Assessment and Plan  79 year old female with a past medical  history of collagenous colitis, seizure disorder, hyperlipidemia, chronic pain syndrome, GERD, trigeminal neuralgia who presented to the emergency room for the evaluation of weakness and was admitted with sepsis due to multifocal pneumonia and pyelonephritis/UTI.        Sepsis  - Due to  and/or respiratory infection.    - Patient covered broadly on cefepime, vancomycin and azithromycin then meropenem.    - Patient with history of MRSA and is + on follow up testing.    - Switched to Vanco Levaquin due what looks like a drug rash overnight.  -   - Follow-up on blood and urine cultures.    - Gentle hydration.    - Start downgrade antibiotic today as blood culture remains negative     Bilateral multifocal pneumonia  - community-acquired pneumonia versus aspiration pneumonia.    - Patient currently on levaquin and vanco due to drug rash with original antibiotics.    -   - Optimize lung cares with breathing treatments  - Wean off oxygen as tolerated     UTI with pyelonephritis   - Urine culture grew E. coli, pansensitive  - continue current abx  - Discontinue vancomycin and continue Levaquin     Gastritis/GERD.    -- Start PPI     Constipation   -MiraLAX daily.     Collagenous colitis  -noted patient's is always between diarrhea and constipation.     Trigeminal neuralgia-  -patient has chronic pain.    -Continue Neurontin  -Patient feels that her pain is getting worse and not controlled  -Pain management consult, appreciate input     Moderate malnutrition with failure to thrive  - progressive weakness, falls, forgetfulness, low blood pressure, muscle weakness.   - Elevated cortisol, normal TSH.   - MRI from 7/30/2022 with ventriculitis and focal abscesses.    -  recheck MRI of the brain but patient declined.  - Patient reports about 20 to 30 pound weight loss,   -Nutritional consult,  appreciate input.    Hypokalemia and hypomagnesemia  -Replace per protocol     Acute on chronic pain syndrome  -pain management consult, appreciate input    Weakness  -PT OT evaluation  -Patient needs TCU        40 MINUTES SPENT BY ME on the date of service doing chart review, history, exam, documentation & further activities per the note    Active Problems:    Sepsis (H)    Acute UTI    Bilateral pneumonia      VTE prophylaxis:  Enoxaparin (Lovenox) SQ  DIET: Orders Placed This Encounter      Mechanical/Dental Soft Diet      Disposition/Barriers to discharge: IV antibiotic, pain control.  Code Status: Full Code    Subjective:  Julsia is feeling better today, still has pain in her left face on and off.    PHYSICAL EXAM  Vitals:    05/29/24 0947 05/30/24 0406 05/31/24 0355   Weight: 46.8 kg (103 lb 3.2 oz) 46.5 kg (102 lb 8.2 oz) 45.8 kg (100 lb 14.4 oz)     B/P:116/55 T:98.2 P:85 R:16     Intake/Output Summary (Last 24 hours) at 5/31/2024 1459  Last data filed at 5/31/2024 1300  Gross per 24 hour   Intake 1850 ml   Output 1900 ml   Net -50 ml      Body mass index is 16.79 kg/m .    Constitutional: awake, alert, cooperative, no apparent distress, and appears stated age  Respiratory: No increased work of breathing, good air exchange, clear to auscultation bilaterally, no crackles or wheezing  Cardiovascular: Normal apical impulse, regular rate and rhythm, normal S1 and S2, no S3 or S4, and no murmur noted  GI: No scars, normal bowel sounds, soft, non-distended, non-tender, no masses palpated, no hepatosplenomegally  Skin: no bruising or bleeding and normal skin color, texture, turgor  Musculoskeletal: There is no redness, warmth, or swelling of the joints.  Full range of motion noted.  no lower extremity pitting edema present  Neurologic: Awake, alert, oriented to name, place and time.  Cranial nerves II-XII are grossly intact.  Motor is 5 out of 5 bilaterally.   Sensory is intact.    Neuropsychiatric: Appropriate  with examiner      PERTINENT LABS/IMAGING:    I have personally reviewed the following data over the past 24 hrs:    6.5  \   11.0 (L)   / 298     141 106 5.5 (L) /  82   2.9 (L) 28 0.52 \     ALT: 17 AST: 11 AP: 86 TBILI: 0.2   ALB: 2.8 (L) TOT PROTEIN: 5.2 (L) LIPASE: N/A     Procal: 0.25 CRP: N/A Lactic Acid: N/A         Imaging results reviewed over the past 24 hrs:   No results found for this or any previous visit (from the past 24 hour(s)).    Discussed with patient, family, nursing staff and discharge planner    Michael Hernandez MD  [facility] Medicine Service  [phone]

## 2024-06-01 ENCOUNTER — APPOINTMENT (OUTPATIENT)
Dept: OCCUPATIONAL THERAPY | Facility: HOSPITAL | Age: 79
DRG: 871 | End: 2024-06-01
Payer: COMMERCIAL

## 2024-06-01 LAB
ANION GAP SERPL CALCULATED.3IONS-SCNC: 8 MMOL/L (ref 7–15)
BUN SERPL-MCNC: 5.4 MG/DL (ref 8–23)
CALCIUM SERPL-MCNC: 8.2 MG/DL (ref 8.8–10.2)
CHLORIDE SERPL-SCNC: 108 MMOL/L (ref 98–107)
CREAT SERPL-MCNC: 0.49 MG/DL (ref 0.51–0.95)
DEPRECATED HCO3 PLAS-SCNC: 29 MMOL/L (ref 22–29)
EGFRCR SERPLBLD CKD-EPI 2021: >90 ML/MIN/1.73M2
GLUCOSE SERPL-MCNC: 84 MG/DL (ref 70–99)
MAGNESIUM SERPL-MCNC: 1.8 MG/DL (ref 1.7–2.3)
PLATELET # BLD AUTO: 300 10E3/UL (ref 150–450)
POTASSIUM SERPL-SCNC: 3.6 MMOL/L (ref 3.4–5.3)
SODIUM SERPL-SCNC: 145 MMOL/L (ref 135–145)

## 2024-06-01 PROCEDURE — 250N000013 HC RX MED GY IP 250 OP 250 PS 637: Performed by: INTERNAL MEDICINE

## 2024-06-01 PROCEDURE — 250N000013 HC RX MED GY IP 250 OP 250 PS 637: Performed by: FAMILY MEDICINE

## 2024-06-01 PROCEDURE — 97535 SELF CARE MNGMENT TRAINING: CPT | Mod: GO

## 2024-06-01 PROCEDURE — 80048 BASIC METABOLIC PNL TOTAL CA: CPT | Performed by: INTERNAL MEDICINE

## 2024-06-01 PROCEDURE — 250N000011 HC RX IP 250 OP 636: Performed by: FAMILY MEDICINE

## 2024-06-01 PROCEDURE — 83735 ASSAY OF MAGNESIUM: CPT | Performed by: INTERNAL MEDICINE

## 2024-06-01 PROCEDURE — 85049 AUTOMATED PLATELET COUNT: CPT | Performed by: FAMILY MEDICINE

## 2024-06-01 PROCEDURE — 36415 COLL VENOUS BLD VENIPUNCTURE: CPT | Performed by: FAMILY MEDICINE

## 2024-06-01 PROCEDURE — 97530 THERAPEUTIC ACTIVITIES: CPT | Mod: GO

## 2024-06-01 PROCEDURE — 99232 SBSQ HOSP IP/OBS MODERATE 35: CPT | Performed by: INTERNAL MEDICINE

## 2024-06-01 PROCEDURE — 120N000004 HC R&B MS OVERFLOW

## 2024-06-01 RX ORDER — BUDESONIDE 9 MG/1
9 TABLET, FILM COATED, EXTENDED RELEASE ORAL EVERY MORNING
Qty: 30 TABLET | Refills: 0 | Status: SHIPPED | OUTPATIENT
Start: 2024-06-01 | End: 2024-08-11

## 2024-06-01 RX ORDER — MAGNESIUM OXIDE 400 MG/1
400 TABLET ORAL EVERY 6 HOURS
Status: COMPLETED | OUTPATIENT
Start: 2024-06-01 | End: 2024-06-01

## 2024-06-01 RX ORDER — POTASSIUM CHLORIDE 750 MG/1
10 TABLET, EXTENDED RELEASE ORAL ONCE
Qty: 1 TABLET | Refills: 0 | Status: COMPLETED | OUTPATIENT
Start: 2024-06-01 | End: 2024-06-01

## 2024-06-01 RX ADMIN — GABAPENTIN 100 MG: 100 CAPSULE ORAL at 08:31

## 2024-06-01 RX ADMIN — ACETAMINOPHEN AND CODEINE PHOSPHATE 1 TABLET: 300; 30 TABLET ORAL at 06:10

## 2024-06-01 RX ADMIN — MAGNESIUM OXIDE TAB 400 MG (241.3 MG ELEMENTAL MG) 400 MG: 400 (241.3 MG) TAB at 11:38

## 2024-06-01 RX ADMIN — MIRTAZAPINE 15 MG: 7.5 TABLET, FILM COATED ORAL at 21:25

## 2024-06-01 RX ADMIN — POTASSIUM CHLORIDE 10 MEQ: 750 TABLET, EXTENDED RELEASE ORAL at 08:30

## 2024-06-01 RX ADMIN — OXCARBAZEPINE 450 MG: 150 TABLET, FILM COATED ORAL at 21:26

## 2024-06-01 RX ADMIN — PSYLLIUM HUSK 1 PACKET: 3.4 POWDER ORAL at 11:37

## 2024-06-01 RX ADMIN — ACETAMINOPHEN AND CODEINE PHOSPHATE 1 TABLET: 300; 30 TABLET ORAL at 21:36

## 2024-06-01 RX ADMIN — ACETAMINOPHEN AND CODEINE PHOSPHATE 1 TABLET: 300; 30 TABLET ORAL at 16:34

## 2024-06-01 RX ADMIN — ACETAMINOPHEN AND CODEINE PHOSPHATE 1 TABLET: 300; 30 TABLET ORAL at 11:38

## 2024-06-01 RX ADMIN — NORTRIPTYLINE HYDROCHLORIDE 50 MG: 50 CAPSULE ORAL at 21:25

## 2024-06-01 RX ADMIN — LEVOFLOXACIN 500 MG: 500 TABLET, FILM COATED ORAL at 08:31

## 2024-06-01 RX ADMIN — BUDESONIDE 9 MG: 3 CAPSULE, GELATIN COATED ORAL at 08:31

## 2024-06-01 RX ADMIN — PANTOPRAZOLE SODIUM 40 MG: 40 TABLET, DELAYED RELEASE ORAL at 06:10

## 2024-06-01 RX ADMIN — GABAPENTIN 300 MG: 300 CAPSULE ORAL at 21:24

## 2024-06-01 RX ADMIN — LEVETIRACETAM 750 MG: 250 TABLET, FILM COATED ORAL at 21:25

## 2024-06-01 RX ADMIN — MAGNESIUM OXIDE TAB 400 MG (241.3 MG ELEMENTAL MG) 400 MG: 400 (241.3 MG) TAB at 06:10

## 2024-06-01 RX ADMIN — ENOXAPARIN SODIUM 30 MG: 30 INJECTION SUBCUTANEOUS at 21:26

## 2024-06-01 RX ADMIN — LEVETIRACETAM 750 MG: 250 TABLET, FILM COATED ORAL at 08:31

## 2024-06-01 ASSESSMENT — ACTIVITIES OF DAILY LIVING (ADL)
ADLS_ACUITY_SCORE: 56
ADLS_ACUITY_SCORE: 56
ADLS_ACUITY_SCORE: 53
ADLS_ACUITY_SCORE: 56
ADLS_ACUITY_SCORE: 53
ADLS_ACUITY_SCORE: 53
ADLS_ACUITY_SCORE: 56
ADLS_ACUITY_SCORE: 53
ADLS_ACUITY_SCORE: 53
ADLS_ACUITY_SCORE: 56
ADLS_ACUITY_SCORE: 53
ADLS_ACUITY_SCORE: 56
ADLS_ACUITY_SCORE: 53
ADLS_ACUITY_SCORE: 56
ADLS_ACUITY_SCORE: 53
ADLS_ACUITY_SCORE: 53

## 2024-06-01 NOTE — PLAN OF CARE
Goal Outcome Evaluation:    Pt alert and oriented x4.   Tylenol #3 given for facial pain which was effective.  Pt had large BM tonight. SBA to bedside commode.

## 2024-06-01 NOTE — PROGRESS NOTES
[facility]    PROGRESS NOTE - Hospitalist Service    Assessment and Plan  79 year old female with a past medical  history of collagenous colitis, seizure disorder, hyperlipidemia, chronic pain syndrome, GERD, trigeminal neuralgia who presented to the emergency room for the evaluation of weakness and was admitted with sepsis due to multifocal pneumonia and pyelonephritis/UTI.        Sepsis  - Due to  and/or respiratory infection.    - Patient covered broadly on cefepime, vancomycin and azithromycin then meropenem.    - Patient with history of MRSA and is + on follow up testing.    - Switched to Vanco Levaquin due what looks like a drug rash overnight.    - Follow-up on blood and urine cultures.    - Gentle hydration.    - Start downgrade antibiotic today as blood culture remains negative  -Discontinue vancomycin and continue with Levaquin.     Bilateral multifocal pneumonia  - community-acquired pneumonia versus aspiration pneumonia.    - Patient currently on levaquin and vanco due to drug rash with original antibiotics.    -   - Optimize lung cares with breathing treatments  - Wean off oxygen as tolerated     UTI with pyelonephritis   - Urine culture grew E. coli, pansensitive  - continue current abx  - Discontinue vancomycin and continue Levaquin     Gastritis/GERD.    -- Start PPI     Constipation   -MiraLAX daily.     Collagenous colitis  -noted patient's is always between diarrhea and constipation.  -Resume home medications     Trigeminal neuralgia-  -patient has chronic pain.    -Continue Neurontin  -Patient feels that her pain is getting worse and not controlled  -Pain management consult, appreciate input     Moderate malnutrition with failure to thrive  - progressive weakness, falls, forgetfulness, low blood pressure, muscle weakness.   - Elevated cortisol, normal TSH.   - MRI from 7/30/2022 with ventriculitis and focal abscesses.    -  recheck MRI of the brain but patient  declined.  - Patient reports about 20 to 30 pound weight loss,   -Nutritional consult, appreciate input.     Hypokalemia and hypomagnesemia  -Replace per protocol     Acute on chronic pain syndrome  -pain management consult, appreciate input     Weakness  -PT OT evaluation  -Patient needs TCU     40 MINUTES SPENT BY ME on the date of service doing chart review, history, exam, documentation & further activities per the note    Active Problems:    Sepsis (H)    Acute UTI    Bilateral pneumonia      VTE prophylaxis:  Enoxaparin (Lovenox) SQ  DIET: Orders Placed This Encounter      Mechanical/Dental Soft Diet      Disposition/Barriers to discharge: Downgrading antibiotics  Code Status: Full Code    Subjective:  Julisa is feeling much better today, no fever or chills.  Tolerating oral diet, had BM.    PHYSICAL EXAM  Vitals:    05/30/24 0406 05/31/24 0355 06/01/24 0619   Weight: 46.5 kg (102 lb 8.2 oz) 45.8 kg (100 lb 14.4 oz) 44.6 kg (98 lb 5.2 oz)     B/P:101/52 T:97.9 P:89 R:18     Intake/Output Summary (Last 24 hours) at 6/1/2024 1430  Last data filed at 6/1/2024 1400  Gross per 24 hour   Intake 1160 ml   Output 900 ml   Net 260 ml      Body mass index is 16.36 kg/m .    Constitutional: awake, alert, cooperative, no apparent distress, and appears stated age  Respiratory: No increased work of breathing, good air exchange, clear to auscultation bilaterally, no crackles or wheezing  Cardiovascular: Normal apical impulse, regular rate and rhythm, normal S1 and S2, no S3 or S4, and no murmur noted  GI: No scars, normal bowel sounds, soft, non-distended, non-tender, no masses palpated, no hepatosplenomegally  Skin: no bruising or bleeding and normal skin color, texture, turgor  Musculoskeletal: There is no redness, warmth, or swelling of the joints.  Full range of motion noted.  no lower extremity pitting edema present  Neurologic: Awake, alert, oriented to name, place and time.  Cranial nerves II-XII are grossly intact.   Motor is 5 out of 5 bilaterally.   Sensory is intact.    Neuropsychiatric: Appropriate with examiner      PERTINENT LABS/IMAGING:    I have personally reviewed the following data over the past 24 hrs:    N/A  \   N/A   / 300     145 108 (H) 5.4 (L) /  84   3.6 29 0.49 (L) \       Imaging results reviewed over the past 24 hrs:   No results found for this or any previous visit (from the past 24 hour(s)).    Discussed with patient, family, nursing staff and discharge planner    Michael Hernandez MD   Medicine Service  [phone]

## 2024-06-01 NOTE — PLAN OF CARE
Problem: Adult Inpatient Plan of Care  Goal: Plan of Care Review  Description: The Plan of Care Review/Shift note should be completed every shift.  The Outcome Evaluation is a brief statement about your assessment that the patient is improving, declining, or no change.  This information will be displayed automatically on your shift  note.  Outcome: Progressing   Goal Outcome Evaluation:      Pt A&OX4, up to chair for meals, SBA with pivot transfers. Gait steady. Voiding well. Appetite good. Pt has sacral mepilex on and geomatt in recliner for pressure reduction. Pt encouarged to reposition often in bed and chair. Pulmonary toilet encouarged. Pt afebrile and on oral Levaquin for PNA/UTI. BC NGTD. Pt refuses to do IS due to left facial pain. Prn tylenol #3 given with some relief. Pt goal for pain 6/10 and comfortable with that, able to eat and converse. Declines ice. Pt denies UTI sx. Falls program in place, Plan of care ongoing.

## 2024-06-01 NOTE — PLAN OF CARE
Goal Outcome Evaluation:                      Patient still in pain, facial pain, and pain medication given PRN Tylenol with codeine  TELE is Discontinue,   Patient still in contact precautions, still on the IV antibiotic.

## 2024-06-01 NOTE — PLAN OF CARE
Goal Outcome Evaluation:       No acute issues overnight. Vitals stable. Tylenol #3 given for L facial pain. Possible discharge to TCU today.

## 2024-06-02 LAB
BACTERIA BLD CULT: NO GROWTH
BACTERIA BLD CULT: NO GROWTH
CREAT SERPL-MCNC: 0.5 MG/DL (ref 0.51–0.95)
EGFRCR SERPLBLD CKD-EPI 2021: >90 ML/MIN/1.73M2
MAGNESIUM SERPL-MCNC: 1.9 MG/DL (ref 1.7–2.3)
POTASSIUM SERPL-SCNC: 3.6 MMOL/L (ref 3.4–5.3)

## 2024-06-02 PROCEDURE — 36415 COLL VENOUS BLD VENIPUNCTURE: CPT | Performed by: INTERNAL MEDICINE

## 2024-06-02 PROCEDURE — 250N000011 HC RX IP 250 OP 636: Performed by: FAMILY MEDICINE

## 2024-06-02 PROCEDURE — 250N000013 HC RX MED GY IP 250 OP 250 PS 637: Performed by: FAMILY MEDICINE

## 2024-06-02 PROCEDURE — 83735 ASSAY OF MAGNESIUM: CPT | Performed by: INTERNAL MEDICINE

## 2024-06-02 PROCEDURE — 84132 ASSAY OF SERUM POTASSIUM: CPT | Performed by: INTERNAL MEDICINE

## 2024-06-02 PROCEDURE — 99232 SBSQ HOSP IP/OBS MODERATE 35: CPT | Performed by: INTERNAL MEDICINE

## 2024-06-02 PROCEDURE — 250N000013 HC RX MED GY IP 250 OP 250 PS 637: Performed by: INTERNAL MEDICINE

## 2024-06-02 PROCEDURE — 120N000004 HC R&B MS OVERFLOW

## 2024-06-02 PROCEDURE — 82565 ASSAY OF CREATININE: CPT | Performed by: INTERNAL MEDICINE

## 2024-06-02 RX ORDER — PANTOPRAZOLE SODIUM 40 MG/1
40 TABLET, DELAYED RELEASE ORAL
Status: SHIPPED
Start: 2024-06-03 | End: 2024-08-11

## 2024-06-02 RX ORDER — LEVOFLOXACIN 500 MG/1
500 TABLET, FILM COATED ORAL DAILY
Status: SHIPPED
Start: 2024-06-02 | End: 2024-06-07

## 2024-06-02 RX ORDER — ACETAMINOPHEN AND CODEINE PHOSPHATE 300; 30 MG/1; MG/1
1 TABLET ORAL EVERY 6 HOURS PRN
Qty: 12 TABLET | Refills: 0 | Status: SHIPPED | OUTPATIENT
Start: 2024-06-02 | End: 2024-06-05

## 2024-06-02 RX ORDER — MAGNESIUM OXIDE 400 MG/1
400 TABLET ORAL EVERY 6 HOURS
Status: COMPLETED | OUTPATIENT
Start: 2024-06-02 | End: 2024-06-02

## 2024-06-02 RX ORDER — POTASSIUM CHLORIDE 750 MG/1
10 TABLET, EXTENDED RELEASE ORAL ONCE
Qty: 1 TABLET | Refills: 0 | Status: COMPLETED | OUTPATIENT
Start: 2024-06-02 | End: 2024-06-02

## 2024-06-02 RX ADMIN — MAGNESIUM OXIDE TAB 400 MG (241.3 MG ELEMENTAL MG) 400 MG: 400 (241.3 MG) TAB at 16:14

## 2024-06-02 RX ADMIN — POLYETHYLENE GLYCOL 3350 17 G: 17 POWDER, FOR SOLUTION ORAL at 08:00

## 2024-06-02 RX ADMIN — NORTRIPTYLINE HYDROCHLORIDE 50 MG: 50 CAPSULE ORAL at 21:16

## 2024-06-02 RX ADMIN — ACETAMINOPHEN AND CODEINE PHOSPHATE 1 TABLET: 300; 30 TABLET ORAL at 21:42

## 2024-06-02 RX ADMIN — LEVOFLOXACIN 500 MG: 500 TABLET, FILM COATED ORAL at 10:45

## 2024-06-02 RX ADMIN — LEVETIRACETAM 750 MG: 250 TABLET, FILM COATED ORAL at 08:00

## 2024-06-02 RX ADMIN — MAGNESIUM OXIDE TAB 400 MG (241.3 MG ELEMENTAL MG) 400 MG: 400 (241.3 MG) TAB at 06:14

## 2024-06-02 RX ADMIN — SENNOSIDES AND DOCUSATE SODIUM 1 TABLET: 8.6; 5 TABLET ORAL at 21:42

## 2024-06-02 RX ADMIN — ACETAMINOPHEN AND CODEINE PHOSPHATE 1 TABLET: 300; 30 TABLET ORAL at 08:00

## 2024-06-02 RX ADMIN — LEVETIRACETAM 750 MG: 250 TABLET, FILM COATED ORAL at 21:16

## 2024-06-02 RX ADMIN — OXCARBAZEPINE 450 MG: 150 TABLET, FILM COATED ORAL at 21:17

## 2024-06-02 RX ADMIN — ACETAMINOPHEN AND CODEINE PHOSPHATE 1 TABLET: 300; 30 TABLET ORAL at 12:43

## 2024-06-02 RX ADMIN — PSYLLIUM HUSK 1 PACKET: 3.4 POWDER ORAL at 16:14

## 2024-06-02 RX ADMIN — POTASSIUM CHLORIDE 10 MEQ: 750 TABLET, EXTENDED RELEASE ORAL at 08:03

## 2024-06-02 RX ADMIN — GABAPENTIN 100 MG: 100 CAPSULE ORAL at 08:00

## 2024-06-02 RX ADMIN — ACETAMINOPHEN AND CODEINE PHOSPHATE 1 TABLET: 300; 30 TABLET ORAL at 16:52

## 2024-06-02 RX ADMIN — ACETAMINOPHEN AND CODEINE PHOSPHATE 1 TABLET: 300; 30 TABLET ORAL at 01:38

## 2024-06-02 RX ADMIN — BUDESONIDE 9 MG: 3 CAPSULE, GELATIN COATED ORAL at 08:00

## 2024-06-02 RX ADMIN — ENOXAPARIN SODIUM 30 MG: 30 INJECTION SUBCUTANEOUS at 21:17

## 2024-06-02 RX ADMIN — MIRTAZAPINE 15 MG: 7.5 TABLET, FILM COATED ORAL at 21:17

## 2024-06-02 RX ADMIN — GABAPENTIN 300 MG: 300 CAPSULE ORAL at 21:16

## 2024-06-02 RX ADMIN — PANTOPRAZOLE SODIUM 40 MG: 40 TABLET, DELAYED RELEASE ORAL at 06:30

## 2024-06-02 ASSESSMENT — ACTIVITIES OF DAILY LIVING (ADL)
ADLS_ACUITY_SCORE: 53

## 2024-06-02 NOTE — PROGRESS NOTES
Care Management Follow Up    Length of Stay (days): 4    Expected Discharge Date: 06/02/2024     Concerns to be Addressed: Discharge Planning  Patient plan of care discussed at interdisciplinary rounds: Yes    Anticipated Discharge Disposition: TCU   Anticipated Discharge Services: None  Anticipated Discharge DME: None    Patient/family educated on Medicare website which has current facility and service quality ratings: Yes  Education Provided on the Discharge Plan: Yes  Patient/Family in Agreement with the Plan: Yes    Referrals Placed by CM/SW: N/A  Private pay costs discussed: Not applicable    Additional Information:  SWCM reviewed chart updates, Pt is accepted to [facility]. Still waiting [employer] insurance authorization, verified status online and states 'pending clinical review'. [facility] will call [employer] to ask if request can be reviewed today.    Left message for [place] weekend liaison to ask if [facility] TCU could accept Pt today in the case that authorization is received. Awaiting response. Updated hospitalist via Service Seeking.    Later received update that [facility] can accept once Pt has [employer] authorization approved. Still awaiting approval, CM will continue to monitor.    ARISTIDES Wiley

## 2024-06-02 NOTE — PROGRESS NOTES
[facility]    PROGRESS NOTE - Hospitalist Service    Assessment and Plan  79 year old female with a past medical  history of collagenous colitis, seizure disorder, hyperlipidemia, chronic pain syndrome, GERD, trigeminal neuralgia who presented to the emergency room for the evaluation of weakness and was admitted with sepsis due to multifocal pneumonia and pyelonephritis/UTI.        Sepsis  - Due to  and/or respiratory infection.    - Patient covered broadly on cefepime, vancomycin and azithromycin then meropenem.    - Patient with history of MRSA and is + on follow up testing.    - Switched to Vanco Levaquin due what looks like a drug rash overnight.    - Follow-up on blood and urine cultures.    - Gentle hydration.    - Start downgrade antibiotic today as blood culture remains negative  - Discontinue vancomycin and continue with Levaquin.  -Finish course of Levaquin on discharge     Bilateral multifocal pneumonia  - community-acquired pneumonia versus aspiration pneumonia.    - Patient currently on levaquin and vanco due to drug rash with original antibiotics.    - Optimize lung cares with breathing treatments  - Wean off oxygen as tolerated     UTI with pyelonephritis   - Urine culture grew E. coli, pansensitive  - continue current abx  - Discontinue vancomycin and continue Levaquin     Gastritis/GERD.    -- Start PPI     Constipation   -MiraLAX daily.     Collagenous colitis  - noted patient's is always between diarrhea and constipation.  - Resume home medications  - follow-up with GI as scheduled     Trigeminal neuralgia-  -patient has chronic pain.    -Continue Neurontin  -Patient feels that her pain is getting worse and not controlled  -Pain management consult, appreciate input     Moderate malnutrition with failure to thrive  - progressive weakness, falls, forgetfulness, low blood pressure, muscle weakness.   - Elevated cortisol, normal TSH.   - MRI from 7/30/2022 with ventriculitis  and focal abscesses.    -  recheck MRI of the brain but patient declined.  - Patient reports about 20 to 30 pound weight loss,   -Nutritional consult, appreciate input.     Hypokalemia and hypomagnesemia  -Replace per protocol     Acute on chronic pain syndrome  -pain management consult, appreciate input     Weakness  -PT OT evaluation  -Patient needs TCU  -Pending insurance authorization.    35 MINUTES SPENT BY ME on the date of service doing chart review, history, exam, documentation & further activities per the note    Active Problems:    Sepsis (H)    Acute UTI    Bilateral pneumonia      VTE prophylaxis:  Enoxaparin (Lovenox) SQ  DIET: Orders Placed This Encounter      Mechanical/Dental Soft Diet      Diet      Disposition/Barriers to discharge: Pending insurance authorization for TCU  Code Status: Full Code    Subjective:  Julisa is feeling about the same today, no acute significant events overnight.    PHYSICAL EXAM  Vitals:    05/31/24 0355 06/01/24 0619 06/02/24 0333   Weight: 45.8 kg (100 lb 14.4 oz) 44.6 kg (98 lb 5.2 oz) 45.9 kg (101 lb 4.8 oz)     B/P:114/54 T:98.1 P:75 R:20     Intake/Output Summary (Last 24 hours) at 6/2/2024 1218  Last data filed at 6/2/2024 1108  Gross per 24 hour   Intake 1110 ml   Output 1300 ml   Net -190 ml      Body mass index is 16.86 kg/m .    Constitutional: awake, alert, cooperative, no apparent distress, and appears stated age  Respiratory: No increased work of breathing, good air exchange, clear to auscultation bilaterally, no crackles or wheezing  Cardiovascular: Normal apical impulse, regular rate and rhythm, normal S1 and S2, no S3 or S4, and no murmur noted  GI: No scars, normal bowel sounds, soft, non-distended, non-tender, no masses palpated, no hepatosplenomegally  Skin: no bruising or bleeding and normal skin color, texture, turgor  Musculoskeletal: There is no redness, warmth, or swelling of the joints.  Full range of motion noted.  no lower extremity pitting  edema present  Neurologic: Awake, alert, oriented to name, place and time.  Cranial nerves II-XII are grossly intact.  Motor is 5 out of 5 bilaterally.   Sensory is intact.    Neuropsychiatric: Appropriate with examiner      PERTINENT LABS/IMAGING:    I have personally reviewed the following data over the past 24 hrs:    N/A  \   N/A   / N/A     N/A N/A N/A /  N/A   3.6 N/A 0.50 (L) \       Imaging results reviewed over the past 24 hrs:   No results found for this or any previous visit (from the past 24 hour(s)).    Discussed with patient, nursing staff and discharge planner    Michael Hernandez MD  [facility] Medicine Service  [phone]

## 2024-06-02 NOTE — PLAN OF CARE
Problem: Adult Inpatient Plan of Care  Goal: Plan of Care Review  Description: The Plan of Care Review/Shift note should be completed every shift.  The Outcome Evaluation is a brief statement about your assessment that the patient is improving, declining, or no change.  This information will be displayed automatically on your shift  note.  Outcome: Progressing   Goal Outcome Evaluation:         Pt A&OX4, up with SBA to commode and chair. Gait steady. Appetite good. No BM, voiding well. Denies UTI sx. Denies PNA sx, no cough or SOB reported. Pulmonary toilet encouarged. Room air sats 90-92%. Prn tylenol #3 used for chronic left check pain with adequate relief. Pt repositioning indep in bed and when in chair. Sacral mepilex in place.Falls program in place. Plan of care ongoing.

## 2024-06-02 NOTE — PLAN OF CARE
Problem: Adult Inpatient Plan of Care  Goal: Absence of Hospital-Acquired Illness or Injury  Intervention: Prevent Skin Injury  Recent Flowsheet Documentation  Taken 6/2/2024 0018 by Jalyn Zimmer RN  Body Position:   position changed independently   heels elevated  Taken 6/1/2024 2136 by Jalyn Zimmer RN  Body Position:   position changed independently   heels elevated     Problem: Pain Acute  Goal: Optimal Pain Control and Function  6/2/2024 0311 by Jalyn Zimmer RN  Outcome: Not Progressing  6/2/2024 0310 by Jalyn Zimmer RN  Outcome: Progressing  Intervention: Develop Pain Management Plan  Recent Flowsheet Documentation  Taken 6/2/2024 0018 by Jalyn Zimmer RN  Pain Management Interventions:   emotional support   music therapy  Taken 6/1/2024 2221 by Jalyn Zimmer RN  Pain Management Interventions:   distraction   music therapy  Taken 6/1/2024 2118 by Jalyn Zimmer RN  Pain Management Interventions: medication (see MAR)  Intervention: Prevent or Manage Pain  Recent Flowsheet Documentation  Taken 6/2/2024 0018 by Jalyn Zimmer RN  Sensory Stimulation Regulation: care clustered  Medication Review/Management: medications reviewed  Taken 6/1/2024 2136 by Jalyn Zimmer RN  Sensory Stimulation Regulation: care clustered  Medication Review/Management: medications reviewed  Intervention: Optimize Psychosocial Wellbeing  Recent Flowsheet Documentation  Taken 6/2/2024 0018 by Jalyn Zimmer RN  Supportive Measures:   active listening utilized   decision-making supported  Taken 6/1/2024 2136 by Jalyn Zimmer RN  Supportive Measures:   active listening utilized   decision-making supported     Pt alert and oriented x4, vitals stable, reported pain on left side of her face (not new per pt)  care is ongoing. through the night, PRN pain medications provided and were effective.

## 2024-06-03 VITALS
DIASTOLIC BLOOD PRESSURE: 54 MMHG | TEMPERATURE: 97.9 F | SYSTOLIC BLOOD PRESSURE: 114 MMHG | BODY MASS INDEX: 16.64 KG/M2 | HEART RATE: 74 BPM | HEIGHT: 65 IN | WEIGHT: 99.9 LBS | OXYGEN SATURATION: 91 % | RESPIRATION RATE: 20 BRPM

## 2024-06-03 LAB
CREAT SERPL-MCNC: 0.5 MG/DL (ref 0.51–0.95)
EGFRCR SERPLBLD CKD-EPI 2021: >90 ML/MIN/1.73M2
MAGNESIUM SERPL-MCNC: 1.9 MG/DL (ref 1.7–2.3)
POTASSIUM SERPL-SCNC: 3.6 MMOL/L (ref 3.4–5.3)

## 2024-06-03 PROCEDURE — 84132 ASSAY OF SERUM POTASSIUM: CPT | Performed by: INTERNAL MEDICINE

## 2024-06-03 PROCEDURE — 250N000013 HC RX MED GY IP 250 OP 250 PS 637: Performed by: INTERNAL MEDICINE

## 2024-06-03 PROCEDURE — 99239 HOSP IP/OBS DSCHRG MGMT >30: CPT | Performed by: INTERNAL MEDICINE

## 2024-06-03 PROCEDURE — 83735 ASSAY OF MAGNESIUM: CPT | Performed by: INTERNAL MEDICINE

## 2024-06-03 PROCEDURE — 250N000013 HC RX MED GY IP 250 OP 250 PS 637: Performed by: FAMILY MEDICINE

## 2024-06-03 PROCEDURE — 36415 COLL VENOUS BLD VENIPUNCTURE: CPT | Performed by: INTERNAL MEDICINE

## 2024-06-03 PROCEDURE — 82565 ASSAY OF CREATININE: CPT | Performed by: INTERNAL MEDICINE

## 2024-06-03 RX ORDER — MAGNESIUM OXIDE 400 MG/1
400 TABLET ORAL EVERY 6 HOURS
Status: COMPLETED | OUTPATIENT
Start: 2024-06-03 | End: 2024-06-03

## 2024-06-03 RX ORDER — POTASSIUM CHLORIDE 750 MG/1
10 TABLET, EXTENDED RELEASE ORAL ONCE
Qty: 1 TABLET | Refills: 0 | Status: COMPLETED | OUTPATIENT
Start: 2024-06-03 | End: 2024-06-03

## 2024-06-03 RX ADMIN — POLYETHYLENE GLYCOL 3350 17 G: 17 POWDER, FOR SOLUTION ORAL at 07:57

## 2024-06-03 RX ADMIN — MAGNESIUM OXIDE TAB 400 MG (241.3 MG ELEMENTAL MG) 400 MG: 400 (241.3 MG) TAB at 06:35

## 2024-06-03 RX ADMIN — LEVOFLOXACIN 500 MG: 500 TABLET, FILM COATED ORAL at 07:51

## 2024-06-03 RX ADMIN — ACETAMINOPHEN AND CODEINE PHOSPHATE 1 TABLET: 300; 30 TABLET ORAL at 10:40

## 2024-06-03 RX ADMIN — ACETAMINOPHEN AND CODEINE PHOSPHATE 1 TABLET: 300; 30 TABLET ORAL at 14:28

## 2024-06-03 RX ADMIN — BUDESONIDE 9 MG: 3 CAPSULE, GELATIN COATED ORAL at 07:54

## 2024-06-03 RX ADMIN — PSYLLIUM HUSK 1 PACKET: 3.4 POWDER ORAL at 08:13

## 2024-06-03 RX ADMIN — POTASSIUM CHLORIDE 10 MEQ: 750 TABLET, EXTENDED RELEASE ORAL at 07:55

## 2024-06-03 RX ADMIN — LEVETIRACETAM 750 MG: 250 TABLET, FILM COATED ORAL at 07:52

## 2024-06-03 RX ADMIN — PANTOPRAZOLE SODIUM 40 MG: 40 TABLET, DELAYED RELEASE ORAL at 06:36

## 2024-06-03 RX ADMIN — MAGNESIUM OXIDE TAB 400 MG (241.3 MG ELEMENTAL MG) 400 MG: 400 (241.3 MG) TAB at 14:28

## 2024-06-03 RX ADMIN — GABAPENTIN 100 MG: 100 CAPSULE ORAL at 07:55

## 2024-06-03 RX ADMIN — ACETAMINOPHEN AND CODEINE PHOSPHATE 1 TABLET: 300; 30 TABLET ORAL at 06:35

## 2024-06-03 ASSESSMENT — ACTIVITIES OF DAILY LIVING (ADL)
ADLS_ACUITY_SCORE: 50
ADLS_ACUITY_SCORE: 50
ADLS_ACUITY_SCORE: 53
ADLS_ACUITY_SCORE: 50
ADLS_ACUITY_SCORE: 50
ADLS_ACUITY_SCORE: 53
ADLS_ACUITY_SCORE: 50
ADLS_ACUITY_SCORE: 53
ADLS_ACUITY_SCORE: 50

## 2024-06-03 NOTE — PLAN OF CARE
Physical Therapy Discharge Summary    Reason for therapy discharge:    Discharged to transitional care facility.    Progress towards therapy goal(s). See goals on Care Plan in [facility] electronic health record for goal details.  Goals partially met.  Barriers to achieving goals:   discharge from facility.    Therapy recommendation(s):    Continued therapy is recommended.  Rationale/Recommendations:  toimprove functional mobility.

## 2024-06-03 NOTE — PROGRESS NOTES
Care Management Discharge Note    Discharge Date: 06/03/2024       Discharge Disposition: Transitional Care ([facility])    Discharge Services:  per TCU    Discharge DME:  per therapy    Discharge Transportation:  daughter to transport    Private pay costs discussed: Not applicable    Does the patient's insurance plan have a 3 day qualifying hospital stay waiver?  Yes     Which insurance plan 3 day waiver is available? Alternative insurance waiver    Will the waiver be used for post-acute placement? Yes    PAS Confirmation Code: [id number]  Patient/family educated on Medicare website which has current facility and service quality ratings:      Education Provided on the Discharge Plan:    Persons Notified of Discharge Plans: yes  Patient/Family in Agreement with the Plan:      Handoff Referral Completed: Yes    Additional Information:  Pt adequate for discharge. Therapy recommendation is TCU and pt has been accepted to [facility]. Emily wilde has been approved. Pt and daughter are accepting of the discharge plan and daughter will transport pt to TCU today at 1500. No further CM needs at this time. CM will sign off.    Leigh Yin RN

## 2024-06-03 NOTE — PLAN OF CARE
Occupational Therapy Discharge Summary    Reason for therapy discharge:    Discharged to transitional care facility.    Progress towards therapy goal(s). See goals on Care Plan in [facility] electronic health record for goal details.  Goals partially met.  Barriers to achieving goals:   discharge from facility.    Therapy recommendation(s):    Continued therapy is recommended.  Rationale/Recommendations:  Recommend continued OT to increase functional mobility/endurance for ADLs/IADLs.

## 2024-06-03 NOTE — DISCHARGE SUMMARY
"[facility]  Hospitalist Discharge Summary      Date of Admission:  5/28/2024  Date of Discharge:  6/3/2024  Discharging Provider: Michael Hernandez MD  Discharge Service: Hospitalist Service    Discharge Diagnoses   Sepsis secondary to pneumonia and UTI, improved  Bilateral multifocal pneumonia  UTI with pyelonephritis, resolved  GERD/gastritis.  Constipation, improved  Collagenous colitis   Chronic pain syndrome secondary to trigeminal neuralgia  Hypokalemia and hypomagnesemia, replaced  Weakness and deconditioning.    Moderate malnutrition  Cachexia    Clinically Significant Risk Factors     # Cachexia: Estimated body mass index is 16.62 kg/m  as calculated from the following:    Height as of this encounter: 1.651 m (5' 5\").    Weight as of this encounter: 45.3 kg (99 lb 14.4 oz).  # Moderate Malnutrition: based on nutrition assessment      Follow-ups Needed After Discharge   Follow-up Appointments     Follow Up and recommended labs and tests      Follow up with Nursing home physician.  No follow up labs or test are   needed.  Follow up with specialist, GI as scheduled            Unresulted Labs Ordered in the Past 30 Days of this Admission       No orders found from 4/28/2024 to 5/29/2024.        These results will be followed up by PCP    Discharge Disposition   Discharged to nursing home  Condition at discharge: Stable    Hospital Course     79 year old female with a past medical  history of collagenous colitis, seizure disorder, hyperlipidemia, chronic pain syndrome, GERD, trigeminal neuralgia who presented to the emergency room for the evaluation of weakness and was admitted with sepsis due to multifocal pneumonia and pyelonephritis/UTI.  Please refer to H&P for details        Sepsis  - Due to  and/or respiratory infection.    - Patient covered broadly on cefepime, vancomycin and azithromycin then meropenem.    - Patient with history of MRSA and is + on follow up testing.    - Switched " to Vanco Levaquin due what looks like a drug rash overnight.    - Follow-up on blood and urine cultures.    - Gentle hydration.    - Start downgrade antibiotic as blood culture remains negative  - Discontinue vancomycin and continue with Levaquin.  - Finish course of Levaquin on discharge     Bilateral multifocal pneumonia  - community-acquired pneumonia versus aspiration pneumonia.    - Patient currently on levaquin and vanco due to drug rash with original antibiotics.    - Optimize lung cares with breathing treatments  - Wean off oxygen as tolerated     UTI with pyelonephritis   - Urine culture grew E. coli, pansensitive  - continue current abx  - Discontinue vancomycin and continue Levaquin on discharge as above     Gastritis/GERD.    -- Start PPI  -Continue on discharge     Constipation   -MiraLAX daily.     Collagenous colitis  - noted patient's is always between diarrhea and constipation.  - Resume home medications  - follow-up with GI as scheduled     Trigeminal neuralgia-  -patient has chronic pain.    -Continue Neurontin  -Patient feels that her pain is getting worse and not controlled  -Pain management consult, appreciate input     Moderate malnutrition with failure to thrive  - progressive weakness, falls, forgetfulness, low blood pressure, muscle weakness.   - Elevated cortisol, normal TSH.   - MRI from 7/30/2022 with ventriculitis and focal abscesses.    -  recheck MRI of the brain but patient declined.  - Patient reports about 20 to 30 pound weight loss,   -Nutritional consult, appreciate input.     Hypokalemia and hypomagnesemia  -Replaced per protocol     Acute on chronic pain syndrome  -pain management consult, appreciate input     Weakness  -PT OT evaluation  -Patient needs TCU  - insurance authorization approved her on 6/3/2024.  -Discharge to TCU today    Discussed with patient, family, nursing staff and discharge planner.    Consultations This Hospital Stay   PHARMACY TO DOSE [facility]  PHARMACY TO  DOSE [facility]  NUTRITION SERVICES ADULT IP CONSULT  CARE MANAGEMENT / SOCIAL WORK IP CONSULT  PHARMACY IP CONSULT  PHYSICAL THERAPY ADULT IP CONSULT  OCCUPATIONAL THERAPY ADULT IP CONSULT  PAIN MANAGEMENT ADULT IP CONSULT  PHYSICAL THERAPY ADULT IP CONSULT  OCCUPATIONAL THERAPY ADULT IP CONSULT    Code Status   Full Code    Time Spent on this Encounter   I, Michael Hernandez MD, personally saw the patient today and spent greater than 30 minutes discharging this patient.       Michael Hernandez MD  [address] [address]  Phone: [phone]  Fax: [phone]  ______________________________________________________________________    Physical Exam   Vital Signs: Temp: 97.9  F (36.6  C) Temp src: Oral BP: 114/54 Pulse: 74   Resp: 20 SpO2: 91 % O2 Device: None (Room air)    Weight: 99 lbs 14.4 oz  Constitutional: awake, alert, cooperative, no apparent distress, and appears stated age  Respiratory: No increased work of breathing, good air exchange, clear to auscultation bilaterally, no crackles or wheezing  Cardiovascular: Normal apical impulse, regular rate and rhythm, normal S1 and S2, no S3 or S4, and no murmur noted  GI: No scars, normal bowel sounds, soft, non-distended, non-tender, no masses palpated, no hepatosplenomegally  Skin: no bruising or bleeding and normal skin color, texture, turgor  Musculoskeletal: There is no redness, warmth, or swelling of the joints.  Full range of motion noted.  no lower extremity pitting edema present  Neurologic: Awake, alert, oriented to name, place and time.  Cranial nerves II-XII are grossly intact.  Motor is 5 out of 5 bilaterally.   Sensory is intact.    Neuropsychiatric: Appropriate with examiner       Primary Care Physician   Mara Murillo    Discharge Orders      General info for SNF    Length of Stay Estimate: Short Term Care: Estimated # of Days <30  Condition at Discharge: Stable  Level of care:skilled    Rehabilitation Potential: Fair  Admission H&P remains valid and up-to-date: Yes  Recent Chemotherapy: N/A  Use Nursing Home Standing Orders: Yes     Mantoux instructions    Give two-step Mantoux (PPD) Per Facility Policy Yes     Follow Up and recommended labs and tests    Follow up with Nursing home physician.  No follow up labs or test are needed.  Follow up with specialist, GI as scheduled     Reason for your hospital stay    UTI, pneumonia     Activity - Up with assistive device     Physical Therapy Adult Consult    Evaluate and treat as clinically indicated.    Reason:  weakness     Occupational Therapy Adult Consult    Evaluate and treat as clinically indicated.    Reason:  weakness     Fall precautions     Diet    Follow this diet upon discharge: Orders Placed This Encounter      Snacks/Supplements Adult: Gelatein Plus; With Meals      Mechanical/Dental Soft Diet       Significant Results and Procedures   Most Recent 3 CBC's:  Recent Labs   Lab Test 06/01/24  0505 05/31/24  0441 05/30/24  1701 05/30/24  0523 05/29/24  0945   WBC  --  6.5  --  9.0 16.5*   HGB  --  11.0* 11.1* 10.8* 10.4*   MCV  --  88  --  90 90    298  --  251 215     Most Recent 3 BMP's:  Recent Labs   Lab Test 06/03/24  0454 06/02/24  0436 06/01/24  0505 05/31/24  1449 05/31/24  0542 05/30/24  0523   NA  --   --  145  --  141 142   POTASSIUM 3.6 3.6 3.6   < > 2.9* 3.4   CHLORIDE  --   --  108*  --  106 111*   CO2  --   --  29 -- 28 24   BUN  --   --  5.4*  --  5.5* 6.9*   CR 0.50* 0.50* 0.49*  --  0.52 0.52   ANIONGAP  --   --  8  --  7 7   ADY  --   --  8.2*  --  8.3* 8.5*   GLC  --   --  84  --  82 84    < > = values in this interval not displayed.     Most Recent 2 LFT's:  Recent Labs   Lab Test 05/31/24  0542 05/29/24  0845   AST 11 17   ALT 17 23   ALKPHOS 86 123   BILITOTAL 0.2 0.2   ,   Results for orders placed or performed during the hospital encounter of 05/28/24   CT Chest/Abdomen/Pelvis w Contrast    Addendum:  5/29/2024    ADDENDUM:  This addendum is issued to highlight findings of subtle wedge-shaped hypoattenuations in the right lower pole kidney mentioned in the body of report. These are indeterminate but may represent mild pyelonephritis. Recommend correlation with urinalysis.    This addendum is relayed to patien't nurse Darcy Hickey at 12:05 AM on 5/29/2024.    END ADDENDUM      Narrative    EXAM: CT CHEST/ABDOMEN/PELVIS W CONTRAST  LOCATION: [facility]  DATE: 5/28/2024    INDICATION: Generalised weakness, leukocytosis, tender abdomen on exam  COMPARISON: 1/17/2024  TECHNIQUE: CT scan of the chest, abdomen, and pelvis was performed following injection of IV contrast. Multiplanar reformats were obtained. Dose reduction techniques were used.   CONTRAST: 44 ml Isovue 370    FINDINGS:   LUNGS AND PLEURA: Multiple patchy opacities and consolidation seen in the right lung with largest consolidation in the right lower lobe. Scattered tree-in-bud nodules also seen in the superior segment of the left lower lobe and lingula, compatible with   an infectious etiology as well. No pleural effusion or pneumothorax. An 8 mm nodule is seen in the superior segment of the right lower lobe on series 4 image 121. A 4 mm nodule in the right upper lobe on series 4 image 77. Mild emphysema is present.    MEDIASTINUM/AXILLAE: A mildly enlarged right paratracheal lymph node measures 2 x 1.4 cm, likely reactive. No axillary or supraclavicular lymphadenopathy.    CORONARY ARTERY CALCIFICATION: Moderate three-vessel coronary artery atherosclerotic calcifications.    HEPATOBILIARY: Liver is normal. Mild intrahepatic biliary ductal dilatation is indeterminate. Common bile duct is normal in caliber. Gallbladder is diffusely thick-walled but fairly decompressed. This finding is similar to prior study.    PANCREAS: Diffuse atrophy without pancreatic ductal dilatation or peripancreatic fluid collections.    SPLEEN:  Normal.    ADRENAL GLANDS: Normal.    KIDNEYS/BLADDER: Small areas of wedge-shaped hypoattenuation in the lower pole of the right kidney (series 3 image 185). No urinary stones or hydroureteronephrosis. Urinary bladder is mildly distended but normal.    BOWEL: Mild wall thickening of the relatively decompressed gastric antrum, indeterminate. Remainder of the gastrointestinal tract including the appendix is normal. Moderate amount of stool seen in the colon.    LYMPH NODES: Normal.    VASCULATURE: Advanced aortoiliac atherosclerotic calcifications. No abdominal aortic aneurysm.    PELVIC ORGANS: Status post hysterectomy. No abnormal adnexal masses.    MUSCULOSKELETAL: Moderate degenerative disc space narrowing from L4 to S1. Mild degenerative height loss of T12. Mild levoscoliosis of the spine. No suspicious osseous lesions or acute fractures.        Impression    IMPRESSION:    1.  Multifocal pneumonia, largest and most consolidative in the right lower lobe.    2.  Mild emphysema with indeterminate right pulmonary nodules measuring up to 8 mm in the superior segment of the right lower lobe. Recommend follow-up chest CT in 3-6 months then at 18-24 months per Fleischner Society 2017 guideline.    3.  Mild wall thickening of the gastric antrum, either representing mild gastritis versus artifact of under distention.    4.  Diffuse wall thickening of a relatively decompressed gallbladder, unchanged from prior study, possibly due to adenomyomatosis.    5.  Moderate colonic stool burden.       Discharge Medications   Current Discharge Medication List        START taking these medications    Details   levofloxacin (LEVAQUIN) 500 MG tablet Take 1 tablet (500 mg) by mouth daily for 5 days    Associated Diagnoses: Pneumonia of both lungs due to Escherichia coli, unspecified part of lung (H)      pantoprazole (PROTONIX) 40 MG EC tablet Take 1 tablet (40 mg) by mouth every morning (before breakfast)    Associated Diagnoses:  Acute gastritis without hemorrhage, unspecified gastritis type           CONTINUE these medications which have CHANGED    Details   acetaminophen-codeine (TYLENOL #3) 300-30 MG per tablet Take 1 tablet by mouth every 6 hours as needed for severe pain  Qty: 12 tablet, Refills: 0    Associated Diagnoses: Trigeminal neuralgia; Chronic pain syndrome      budesonide (UCERIS) 9 MG 24 hr tablet Take 1 tablet (9 mg) by mouth every morning for 30 days  Qty: 30 tablet, Refills: 0    Associated Diagnoses: Collagenous colitis           CONTINUE these medications which have NOT CHANGED    Details   ferrous sulfate (FEROSUL) 325 (65 Fe) MG tablet Take 1 tablet (325 mg) by mouth daily (with breakfast)  Qty: 90 tablet, Refills: 1    Associated Diagnoses: Iron deficiency anemia due to chronic blood loss      !! gabapentin (NEURONTIN) 100 MG capsule Take 100 mg by mouth daily      !! gabapentin (NEURONTIN) 100 MG capsule Take 300 mg by mouth at bedtime      levETIRAcetam (KEPPRA) 750 MG tablet Take 1 tablet (750 mg) by mouth 2 times daily  Qty: 60 tablet, Refills: 0    Associated Diagnoses: Persistent insomnia      loperamide (IMODIUM) 2 MG capsule Take 2 mg by mouth 4 times daily as needed for diarrhea      menthol-zinc oxide (CALMOSEPTINE) 0.44-20.6 % OINT ointment Apply topically 4 times daily as needed for skin protection (for sores on buttox from sitting)      METAMUCIL FIBER PO Take 1 capsule by mouth daily      mirtazapine (REMERON) 15 MG tablet Take 1 tablet (15 mg) by mouth at bedtime  Qty: 30 tablet, Refills: 0    Associated Diagnoses: Current mild episode of major depressive disorder without prior episode (H24)      Multiple Vitamin (MULTIVITAMIN) TABS TAKE 1 TABLET BY MOUTH EVERY DAY  Qty: 100 tablet, Refills: 3    Associated Diagnoses: Trigeminal nerve disorder      nortriptyline (PAMELOR) 50 MG capsule Take 1 capsule (50 mg) by mouth at bedtime  Qty: 90 capsule, Refills: 3    Associated Diagnoses: Trigeminal neuralgia;  Chronic pain syndrome      OXcarbazepine (TRILEPTAL) 150 MG tablet TAKE 3 TABLETS(450 MG) BY MOUTH DAILY  Qty: 270 tablet, Refills: 1    Associated Diagnoses: Facial neuralgia      potassium chloride elin ER (KLOR-CON M20) 20 MEQ CR tablet Take 1 tablet (20 mEq) by mouth daily  Qty: 90 tablet, Refills: 1    Associated Diagnoses: Anxiety      QUEtiapine (SEROQUEL) 50 MG tablet Take 1 tablet (50 mg) by mouth at bedtime  Qty: 30 tablet, Refills: 0    Associated Diagnoses: Anxiety      topiramate (TOPAMAX) 50 MG tablet Take 1 tablet (50 mg) by mouth at bedtime  Qty: 30 tablet, Refills: 0    Associated Diagnoses: Intractable chronic migraine without aura and without status migrainosus      Vitamin D3 50 mcg (2000 units) tablet Take 1 tablet (50 mcg) by mouth daily  Qty: 90 tablet, Refills: 1    Associated Diagnoses: Vitamin D deficiency       !! - Potential duplicate medications found. Please discuss with provider.        Allergies   Allergies   Allergen Reactions    Contrast [Iohexol] Rash     Noticed during 08/2020 admission. Received IV contrast on 8/5    Chocolate Headache    Contrast Dye Rash    Penicillins Rash

## 2024-06-03 NOTE — PLAN OF CARE
Problem: Pain Acute  Goal: Optimal Pain Control and Function  Outcome: Not Progressing  Intervention: Prevent or Manage Pain  Recent Flowsheet Documentation  Taken 6/2/2024 2122 by Jalyn Zimmer, RN  Sensory Stimulation Regulation:   care clustered   television on  Medication Review/Management: medications reviewed  Intervention: Optimize Psychosocial Wellbeing  Recent Flowsheet Documentation  Taken 6/2/2024 2122 by Jalyn Zimmer, RN  Supportive Measures: active listening utilized     Pt alert and oriented x4, reported pain on left side of her face, tylenol-codeine provided some relief, was able to call for help to use the bathroom. Pt given senna,  no BM  since 05/31, not adhering to incentive spirometer, tried twice and said its not easy, needs more encouragement. Care is ongoing.

## 2024-06-03 NOTE — PROVIDER NOTIFICATION
Will take her glasses, IS, and clothing to the care center (Daughter bringing other items for TCU) will be sending her coat and blanket home with daughter at discharge.  Will have one medication from outpatient pharmacy sent at discharge.

## 2024-06-03 NOTE — PLAN OF CARE
Discharging to TCU for rehab. Daughter will transport at 3pm.  Julisa will be sending her blanket from home and her coat home with daughter. All other belongings will go with her to TCU including her incentive spirometer.  Requests to have her tylenol #3 at 2:30 pm prior to transport.  Outpatient pharmacy filling a med that will be sent. Denies further questions. Beti Gibbons RN    Problem: Pneumonia  Goal: Effective Oxygenation and Ventilation  Outcome: Progressing  Intervention: Promote Airway Secretion Clearance  Recent Flowsheet Documentation  Taken 6/3/2024 0800 by Beti Gibbons RN  Cough And Deep Breathing: done with encouragement  Intervention: Optimize Oxygenation and Ventilation  Recent Flowsheet Documentation  Taken 6/3/2024 0822 by Beti Gibbons RN  Head of Bed (HOB) Positioning: (self regulated) --

## 2024-06-04 ENCOUNTER — PATIENT OUTREACH (OUTPATIENT)
Dept: CARE COORDINATION | Facility: CLINIC | Age: 79
End: 2024-06-04
Payer: COMMERCIAL

## 2024-06-04 NOTE — PROGRESS NOTES
[facility]    Background: Transitional Care Management program identified per system criteria and reviewed by [facility] team for possible outreach.    Assessment: Upon chart review, CCR Team member will not proceed with patient outreach related to this episode of Transitional Care Management program due to reason below:    [facility] TCU: Patient discharged to TCU/ARU/LTACH and is established within [facility]. Referral created for Primary Care-Care Coordination program.    Plan: Transitional Care Management episode addressed appropriately per reason noted above.        ANALISA Cloud  [phone]  [facility]

## 2024-06-05 ENCOUNTER — DOCUMENTATION ONLY (OUTPATIENT)
Dept: GERIATRICS | Facility: CLINIC | Age: 79
End: 2024-06-05

## 2024-06-05 ENCOUNTER — TRANSITIONAL CARE UNIT VISIT (OUTPATIENT)
Dept: GERIATRICS | Facility: CLINIC | Age: 79
End: 2024-06-05
Payer: COMMERCIAL

## 2024-06-05 VITALS
RESPIRATION RATE: 16 BRPM | DIASTOLIC BLOOD PRESSURE: 68 MMHG | HEART RATE: 78 BPM | HEIGHT: 65 IN | SYSTOLIC BLOOD PRESSURE: 128 MMHG | BODY MASS INDEX: 16.66 KG/M2 | TEMPERATURE: 98.1 F | OXYGEN SATURATION: 91 % | WEIGHT: 100 LBS

## 2024-06-05 DIAGNOSIS — K52.831 COLLAGENOUS COLITIS: ICD-10-CM

## 2024-06-05 DIAGNOSIS — R53.81 PHYSICAL DECONDITIONING: Primary | ICD-10-CM

## 2024-06-05 DIAGNOSIS — G50.0 TRIGEMINAL NEURALGIA: ICD-10-CM

## 2024-06-05 DIAGNOSIS — R62.7 FAILURE TO THRIVE IN ADULT: ICD-10-CM

## 2024-06-05 PROCEDURE — 99309 SBSQ NF CARE MODERATE MDM 30: CPT | Performed by: NURSE PRACTITIONER

## 2024-06-05 RX ORDER — ACETAMINOPHEN AND CODEINE PHOSPHATE 300; 30 MG/1; MG/1
1 TABLET ORAL EVERY 4 HOURS PRN
Qty: 60 TABLET | Refills: 0 | Status: SHIPPED | OUTPATIENT
Start: 2024-06-05 | End: 2024-06-21

## 2024-06-05 NOTE — LETTER
6/5/2024      Julisa Chaney  [address]  [place] [address]        [facility] GERIATRICS    PRIMARY CARE PROVIDER AND CLINIC:  Mara Murillo MD, [address] [facility] / [place] [address]  Chief Complaint   Patient presents with     Hospital F/U      [place] Medical Record Number:  [id number]  Place of Service where encounter took place:  [facility] (U) [[id number]]    Julisa Chaney  is a 79 year old  (1945), admitted to the above facility from  [facility]. Hospital stay 5/28/24 through 6/3/24. Patient with PMH collagenous colitis and frequent hospitalizations recently for diarrhea, seizure disorder, HLD, chronic pain, trigeminal neuralgia, and GERD presented to the ED with weakness. She was admitted with sepsis due to pneumonia and pyelonephritis. Hospital course complicated by worsening neuralgia, pain not controlled. Pain team increased Tylenol #3 to q4h prn.     HPI obtained from patient visit, review of nursing home record, discussion with facility staff, and Epic review.     HPI:    Patient is well known to this provider and TCU staff after several stays here in the past year. Historically her insurance has determined the discharge date, they did authorize a longer stay last time. She says that her plan is to discharge home again. Provider advises that her frequent stays here indicate that she is not doing well at home and needs more help. She says she has been going through her things (currently books) and trying to give away what she doesn't need. She does say that her diarrhea has finally been resolved for several weeks now with the medication she is on. She continues on budesonide 9mg daily. She was originally scheduled to stop this 6/17, current orders are for 30 more days. She denies any cough or SOB. She is on room air. She mentions her increased facial pain, rubs her left cheek. She does say that the increased frequency in Tylenol #3 is helpful.      CODE STATUS/ADVANCE DIRECTIVES DISCUSSION:  Full Code    ALLERGIES:   Allergies   Allergen Reactions     Contrast [Iohexol] Rash     Noticed during 08/2020 admission. Received IV contrast on 8/5     Chocolate Headache     Contrast Dye Rash     Penicillins Rash      PAST MEDICAL HISTORY:   Past Medical History:   Diagnosis Date     Aspiration pneumonia (H) 02/2022     Depression      High cholesterol      Migraines      Pyloric ulcer, chronic      Sepsis (H) 08/10/2020     Trigeminal neuralgia       PAST SURGICAL HISTORY:   has a past surgical history that includes Colonoscopy w/wo Brush **Performed** (N/A, 8/13/2020); Pr Esophagogastroduodenoscopy Transoral Diagnostic (N/A, 8/13/2020); Hysterectomy; Pr Esophagogastroduodenoscopy Transoral Diagnostic (N/A, 8/14/2020); PICC/Midline Placement (7/22/2022); Ventriculostomy (Left, 7/31/2022); IR Gastrostomy Tube Percutaneous Plcmnt (8/16/2022); and Colonoscopy (N/A, 1/19/2024).  FAMILY HISTORY: family history includes Breast Cancer in her maternal aunt, mother, sister, and sister; Cancer in her father; Testicular cancer (age of onset: 17.00) in her grandchild.  SOCIAL HISTORY:   reports that she quit smoking about 9 years ago. She started smoking about 59 years ago. She has a 50 pack-year smoking history. She has never used smokeless tobacco. She reports that she does not drink alcohol and does not use drugs.  Patient's living condition: lives alone    Post Discharge Medication Reconciliation Status:   MED REC REQUIRED  Post Medication Reconciliation Status:  Discharge medications reconciled, continue medications without change       Current Outpatient Medications   Medication Sig Dispense Refill     acetaminophen-codeine (TYLENOL #3) 300-30 MG per tablet Take 1 tablet by mouth every 4 hours as needed for severe pain 60 tablet 0     budesonide (UCERIS) 9 MG 24 hr tablet Take 1 tablet (9 mg) by mouth every morning for 30 days 30 tablet 0     ferrous sulfate (FEROSUL)  "325 (65 Fe) MG tablet Take 1 tablet (325 mg) by mouth daily (with breakfast) 90 tablet 1     gabapentin (NEURONTIN) 100 MG capsule Take 100 mg by mouth daily       gabapentin (NEURONTIN) 100 MG capsule Take 300 mg by mouth at bedtime       levETIRAcetam (KEPPRA) 750 MG tablet Take 1 tablet (750 mg) by mouth 2 times daily 60 tablet 0     levofloxacin (LEVAQUIN) 500 MG tablet Take 1 tablet (500 mg) by mouth daily for 5 days       loperamide (IMODIUM) 2 MG capsule Take 2 mg by mouth 4 times daily as needed for diarrhea       menthol-zinc oxide (CALMOSEPTINE) 0.44-20.6 % OINT ointment Apply topically 4 times daily as needed for skin protection (for sores on buttox from sitting)       METAMUCIL FIBER PO Take 1 capsule by mouth daily       mirtazapine (REMERON) 15 MG tablet Take 1 tablet (15 mg) by mouth at bedtime 30 tablet 0     Multiple Vitamin (MULTIVITAMIN) TABS TAKE 1 TABLET BY MOUTH EVERY  tablet 3     nortriptyline (PAMELOR) 50 MG capsule Take 1 capsule (50 mg) by mouth at bedtime 90 capsule 3     OXcarbazepine (TRILEPTAL) 150 MG tablet TAKE 3 TABLETS(450 MG) BY MOUTH DAILY 270 tablet 1     pantoprazole (PROTONIX) 40 MG EC tablet Take 1 tablet (40 mg) by mouth every morning (before breakfast)       potassium chloride elin ER (KLOR-CON M20) 20 MEQ CR tablet Take 1 tablet (20 mEq) by mouth daily 90 tablet 1     QUEtiapine (SEROQUEL) 50 MG tablet Take 1 tablet (50 mg) by mouth at bedtime 30 tablet 0     topiramate (TOPAMAX) 50 MG tablet Take 1 tablet (50 mg) by mouth at bedtime 30 tablet 0     Vitamin D3 50 mcg (2000 units) tablet Take 1 tablet (50 mcg) by mouth daily 90 tablet 1     No current facility-administered medications for this visit.       ROS:  4 point ROS including Respiratory, CV, GI and , other than that noted in the HPI,  is negative    Vitals:  /68   Pulse 78   Temp 98.1  F (36.7  C)   Resp 16   Ht 1.651 m (5' 5\")   Wt 45.4 kg (100 lb)   SpO2 91%   BMI 16.64 kg/m  "   Exam:  GENERAL APPEARANCE:  Alert, in no distress, very thin but does appear to be in better health than other recent stays here  ENT:  Mouth and posterior oropharynx normal, moist mucous membranes  EYES:  EOM normal, conjunctiva and lids normal  RESP:  no respiratory distress  CV:  regular rate and rhythm, no murmur, rub, or gallop, no edema  PSYCH:  oriented X 3, memory impaired , affect and mood normal    Lab/Diagnostic data:  Recent labs in [facility] reviewed by me today.     ASSESSMENT/PLAN:  (R53.81) Physical deconditioning  (primary encounter diagnosis)  Comment: Due to acute illness on chronic decline  Plan: PT/OT eval and treat, discharge planning per their recommendations.    (G50.0) Trigeminal neuralgia  Comment: Per chart review, her discharge orders were for Tylenol #3 q6h prn. This does not seem to be intentional. The pain service note did say to increase to q4h prn and there are no subsequent notes that address the medication at all. Will change order to q4h prn    (K52.831) Collagenous colitis  Comment: Chronic. Currently improved with extended course of budesonide. She may have recurrence once this is discontinued.   Plan: f/up with [facility]    (R62.7) Failure to thrive in adult  Comment: At every TCU stay, PHIL is recommended due to cognitive impairment and frequent hospitalizations. She may do better now that her diarrhea is improved, but this was not even the reason for her admission this time  Plan: PT/OT eval and treat, discharge planning per their recommendations.        Orders:  Change Tylenol #3 to 1 tab q4h prn      Electronically signed by:  DANIELLE Villanueva CNP                     Sincerely,        DANIELLE Villanueva CNP

## 2024-06-05 NOTE — PROGRESS NOTES
[facility] GERIATRICS    PRIMARY CARE PROVIDER AND CLINIC:  Mara Murillo MD, [address] [facility] / [facility] [address]  Chief Complaint   Patient presents with    Hospital F/U      [place] Medical Record Number:  [id number]  Place of Service where encounter took place:  [facility] (U) [[id number]]    Julisa Chaney  is a 79 year old  (1945), admitted to the above facility from  [facility]. Hospital stay 5/28/24 through 6/3/24. Patient with PMH collagenous colitis and frequent hospitalizations recently for diarrhea, seizure disorder, HLD, chronic pain, trigeminal neuralgia, and GERD presented to the ED with weakness. She was admitted with sepsis due to pneumonia and pyelonephritis. Hospital course complicated by worsening neuralgia, pain not controlled. Pain team increased Tylenol #3 to q4h prn.     HPI obtained from patient visit, review of nursing home record, discussion with facility staff, and Epic review.     HPI:    Patient is well known to this provider and TCU staff after several stays here in the past year. Historically her insurance has determined the discharge date, they did authorize a longer stay last time. She says that her plan is to discharge home again. Provider advises that her frequent stays here indicate that she is not doing well at home and needs more help. She says she has been going through her things (currently books) and trying to give away what she doesn't need. She does say that her diarrhea has finally been resolved for several weeks now with the medication she is on. She continues on budesonide 9mg daily. She was originally scheduled to stop this 6/17, current orders are for 30 more days. She denies any cough or SOB. She is on room air. She mentions her increased facial pain, rubs her left cheek. She does say that the increased frequency in Tylenol #3 is helpful.     CODE STATUS/ADVANCE DIRECTIVES DISCUSSION:  Full Code    ALLERGIES:    Allergies   Allergen Reactions    Contrast [Iohexol] Rash     Noticed during 08/2020 admission. Received IV contrast on 8/5    Chocolate Headache    Contrast Dye Rash    Penicillins Rash      PAST MEDICAL HISTORY:   Past Medical History:   Diagnosis Date    Aspiration pneumonia (H) 02/2022    Depression     High cholesterol     Migraines     Pyloric ulcer, chronic     Sepsis (H) 08/10/2020    Trigeminal neuralgia       PAST SURGICAL HISTORY:   has a past surgical history that includes Colonoscopy w/wo Brush **Performed** (N/A, 8/13/2020); Pr Esophagogastroduodenoscopy Transoral Diagnostic (N/A, 8/13/2020); Hysterectomy; Pr Esophagogastroduodenoscopy Transoral Diagnostic (N/A, 8/14/2020); PICC/Midline Placement (7/22/2022); Ventriculostomy (Left, 7/31/2022); IR Gastrostomy Tube Percutaneous Plcmnt (8/16/2022); and Colonoscopy (N/A, 1/19/2024).  FAMILY HISTORY: family history includes Breast Cancer in her maternal aunt, mother, sister, and sister; Cancer in her father; Testicular cancer (age of onset: 17.00) in her grandchild.  SOCIAL HISTORY:   reports that she quit smoking about 9 years ago. She started smoking about 59 years ago. She has a 50 pack-year smoking history. She has never used smokeless tobacco. She reports that she does not drink alcohol and does not use drugs.  Patient's living condition: lives alone    Post Discharge Medication Reconciliation Status:   MED REC REQUIRED  Post Medication Reconciliation Status:  Discharge medications reconciled, continue medications without change       Current Outpatient Medications   Medication Sig Dispense Refill    acetaminophen-codeine (TYLENOL #3) 300-30 MG per tablet Take 1 tablet by mouth every 4 hours as needed for severe pain 60 tablet 0    budesonide (UCERIS) 9 MG 24 hr tablet Take 1 tablet (9 mg) by mouth every morning for 30 days 30 tablet 0    ferrous sulfate (FEROSUL) 325 (65 Fe) MG tablet Take 1 tablet (325 mg) by mouth daily (with breakfast) 90 tablet  "1    gabapentin (NEURONTIN) 100 MG capsule Take 100 mg by mouth daily      gabapentin (NEURONTIN) 100 MG capsule Take 300 mg by mouth at bedtime      levETIRAcetam (KEPPRA) 750 MG tablet Take 1 tablet (750 mg) by mouth 2 times daily 60 tablet 0    levofloxacin (LEVAQUIN) 500 MG tablet Take 1 tablet (500 mg) by mouth daily for 5 days      loperamide (IMODIUM) 2 MG capsule Take 2 mg by mouth 4 times daily as needed for diarrhea      menthol-zinc oxide (CALMOSEPTINE) 0.44-20.6 % OINT ointment Apply topically 4 times daily as needed for skin protection (for sores on buttox from sitting)      METAMUCIL FIBER PO Take 1 capsule by mouth daily      mirtazapine (REMERON) 15 MG tablet Take 1 tablet (15 mg) by mouth at bedtime 30 tablet 0    Multiple Vitamin (MULTIVITAMIN) TABS TAKE 1 TABLET BY MOUTH EVERY  tablet 3    nortriptyline (PAMELOR) 50 MG capsule Take 1 capsule (50 mg) by mouth at bedtime 90 capsule 3    OXcarbazepine (TRILEPTAL) 150 MG tablet TAKE 3 TABLETS(450 MG) BY MOUTH DAILY 270 tablet 1    pantoprazole (PROTONIX) 40 MG EC tablet Take 1 tablet (40 mg) by mouth every morning (before breakfast)      potassium chloride elin ER (KLOR-CON M20) 20 MEQ CR tablet Take 1 tablet (20 mEq) by mouth daily 90 tablet 1    QUEtiapine (SEROQUEL) 50 MG tablet Take 1 tablet (50 mg) by mouth at bedtime 30 tablet 0    topiramate (TOPAMAX) 50 MG tablet Take 1 tablet (50 mg) by mouth at bedtime 30 tablet 0    Vitamin D3 50 mcg (2000 units) tablet Take 1 tablet (50 mcg) by mouth daily 90 tablet 1     No current facility-administered medications for this visit.       ROS:  4 point ROS including Respiratory, CV, GI and , other than that noted in the HPI,  is negative    Vitals:  /68   Pulse 78   Temp 98.1  F (36.7  C)   Resp 16   Ht 1.651 m (5' 5\")   Wt 45.4 kg (100 lb)   SpO2 91%   BMI 16.64 kg/m    Exam:  GENERAL APPEARANCE:  Alert, in no distress, very thin but does appear to be in better health than other " recent stays here  ENT:  Mouth and posterior oropharynx normal, moist mucous membranes  EYES:  EOM normal, conjunctiva and lids normal  RESP:  no respiratory distress  CV:  regular rate and rhythm, no murmur, rub, or gallop, no edema  PSYCH:  oriented X 3, memory impaired , affect and mood normal    Lab/Diagnostic data:  Recent labs in [facility] reviewed by me today.     ASSESSMENT/PLAN:  (R53.81) Physical deconditioning  (primary encounter diagnosis)  Comment: Due to acute illness on chronic decline  Plan: PT/OT eval and treat, discharge planning per their recommendations.    (G50.0) Trigeminal neuralgia  Comment: Per chart review, her discharge orders were for Tylenol #3 q6h prn. This does not seem to be intentional. The pain service note did say to increase to q4h prn and there are no subsequent notes that address the medication at all. Will change order to q4h prn    (K52.831) Collagenous colitis  Comment: Chronic. Currently improved with extended course of budesonide. She may have recurrence once this is discontinued.   Plan: f/up with [facility]    (R62.7) Failure to thrive in adult  Comment: At every TCU stay, USP is recommended due to cognitive impairment and frequent hospitalizations. She may do better now that her diarrhea is improved, but this was not even the reason for her admission this time  Plan: PT/OT eval and treat, discharge planning per their recommendations.        Orders:  Change Tylenol #3 to 1 tab q4h prn      Electronically signed by:  DANIELLE Villanueva CNP

## 2024-06-10 ENCOUNTER — TRANSITIONAL CARE UNIT VISIT (OUTPATIENT)
Dept: GERIATRICS | Facility: CLINIC | Age: 79
End: 2024-06-10
Payer: COMMERCIAL

## 2024-06-10 VITALS
RESPIRATION RATE: 16 BRPM | DIASTOLIC BLOOD PRESSURE: 38 MMHG | TEMPERATURE: 97.3 F | HEIGHT: 65 IN | SYSTOLIC BLOOD PRESSURE: 103 MMHG | OXYGEN SATURATION: 96 % | BODY MASS INDEX: 16.66 KG/M2 | WEIGHT: 100 LBS | HEART RATE: 67 BPM

## 2024-06-10 DIAGNOSIS — K52.831 COLLAGENOUS COLITIS: ICD-10-CM

## 2024-06-10 DIAGNOSIS — G50.0 TRIGEMINAL NEURALGIA: ICD-10-CM

## 2024-06-10 DIAGNOSIS — R53.81 PHYSICAL DECONDITIONING: Primary | ICD-10-CM

## 2024-06-10 PROCEDURE — 99309 SBSQ NF CARE MODERATE MDM 30: CPT | Performed by: NURSE PRACTITIONER

## 2024-06-10 NOTE — PROGRESS NOTES
"[facility] GERIATRICS    Chief Complaint   Patient presents with    RECHECK     HPI:  Julisa Chaney is a 79 year old  (1945), who is being seen today for an episodic care visit at: [facility] ([facility]) [[id number]]. Patient admitted to the above facility from  [facility]. Hospital stay 5/28/24 through 6/3/24. Patient with PMH collagenous colitis and frequent hospitalizations recently for diarrhea, seizure disorder, HLD, chronic pain, trigeminal neuralgia, and GERD presented to the ED with weakness. She was admitted with sepsis due to pneumonia and pyelonephritis. Hospital course complicated by worsening neuralgia, pain not controlled. Pain team increased Tylenol #3 to q4h prn.     Today's concern is:   Patient reports that she is doing ok overall, but she is constipated now. She has not had a BM since 6/5. She feels like it is right there and is ready to come out. No N/V. Her pain is well controlled.     Allergies, and PMH/PSH reviewed in EPIC today.  REVIEW OF SYSTEMS:  4 point ROS including Respiratory, CV, GI and , other than that noted in the HPI,  is negative    Objective:   BP (!) 103/38   Pulse 67   Temp 97.3  F (36.3  C)   Resp 16   Ht 1.651 m (5' 5\")   Wt 45.4 kg (100 lb)   SpO2 96%   BMI 16.64 kg/m    GENERAL APPEARANCE:  Alert, in no distress, very thin  EYES:  EOM normal, conjunctiva and lids normal  RESP:  no respiratory distress  PSYCH:  oriented X 3, affect and mood normal    Recent labs in EPIC reviewed by me today.       Assessment/Plan:  (R53.81) Physical deconditioning  (primary encounter diagnosis)  Comment: Due to acute illness. Improving  Plan: PT/OT eval and treat, discharge planning per their recommendations.    (G50.0) Trigeminal neuralgia  Comment: Chronic condition being managed with medications.  Plan: Continue current POC with no changes at this time and adjustments as needed.    (K52.831) Collagenous colitis  Comment: Patient currently " constipated. Given her history of difficult to control diarrhea, would prefer to avoid oral laxatives. She is agreeable to trying a suppository      Orders:  Bisacodyl supp x 1 today      Electronically signed by: DANIELLE Villanueva CNP

## 2024-06-10 NOTE — LETTER
" 6/10/2024      Julisa Chaney  [address]  [place] [address]        [facility] GERIATRICS    Chief Complaint   Patient presents with     RECHECK     HPI:  Julisa Chaney is a 79 year old  (1945), who is being seen today for an episodic care visit at: [facility] ([facility]) [[id number]]. Patient admitted to the above facility from  [facility]. Hospital stay 5/28/24 through 6/3/24. Patient with PMH collagenous colitis and frequent hospitalizations recently for diarrhea, seizure disorder, HLD, chronic pain, trigeminal neuralgia, and GERD presented to the ED with weakness. She was admitted with sepsis due to pneumonia and pyelonephritis. Hospital course complicated by worsening neuralgia, pain not controlled. Pain team increased Tylenol #3 to q4h prn.     Today's concern is:   Patient reports that she is doing ok overall, but she is constipated now. She has not had a BM since 6/5. She feels like it is right there and is ready to come out. No N/V. Her pain is well controlled.     Allergies, and PMH/PSH reviewed in EPIC today.  REVIEW OF SYSTEMS:  4 point ROS including Respiratory, CV, GI and , other than that noted in the HPI,  is negative    Objective:   BP (!) 103/38   Pulse 67   Temp 97.3  F (36.3  C)   Resp 16   Ht 1.651 m (5' 5\")   Wt 45.4 kg (100 lb)   SpO2 96%   BMI 16.64 kg/m    GENERAL APPEARANCE:  Alert, in no distress, very thin  EYES:  EOM normal, conjunctiva and lids normal  RESP:  no respiratory distress  PSYCH:  oriented X 3, affect and mood normal    Recent labs in EPIC reviewed by me today.       Assessment/Plan:  (R53.81) Physical deconditioning  (primary encounter diagnosis)  Comment: Due to acute illness. Improving  Plan: PT/OT eval and treat, discharge planning per their recommendations.    (G50.0) Trigeminal neuralgia  Comment: Chronic condition being managed with medications.  Plan: Continue current POC with no changes at this time and " adjustments as needed.    (L81.647) Collagenous colitis  Comment: Patient currently constipated. Given her history of difficult to control diarrhea, would prefer to avoid oral laxatives. She is agreeable to trying a suppository      Orders:  Bisacodyl supp x 1 today      Electronically signed by: DANIELLE Villanueva CNP           Sincerely,        DANIELLE Villanueva CNP

## 2024-06-13 ENCOUNTER — MYC MEDICAL ADVICE (OUTPATIENT)
Dept: FAMILY MEDICINE | Facility: CLINIC | Age: 79
End: 2024-06-13
Payer: COMMERCIAL

## 2024-06-13 DIAGNOSIS — G50.0 TRIGEMINAL NEURALGIA: ICD-10-CM

## 2024-06-14 NOTE — TELEPHONE ENCOUNTER
RN talked to daughter and confirmed that pharmacy is delivering Tylenol #3 to patient's rehab facility today so patient will have medication upon discharge on Monday.      Nicolle Chatterjee RN on 6/14/2024 at 9:58 AM

## 2024-06-17 ENCOUNTER — DISCHARGE SUMMARY NURSING HOME (OUTPATIENT)
Dept: GERIATRICS | Facility: CLINIC | Age: 79
End: 2024-06-17
Payer: COMMERCIAL

## 2024-06-17 DIAGNOSIS — G50.0 TRIGEMINAL NEURALGIA: ICD-10-CM

## 2024-06-17 DIAGNOSIS — K52.831 COLLAGENOUS COLITIS: ICD-10-CM

## 2024-06-17 DIAGNOSIS — R62.7 FAILURE TO THRIVE IN ADULT: ICD-10-CM

## 2024-06-17 DIAGNOSIS — R53.81 PHYSICAL DECONDITIONING: Primary | ICD-10-CM

## 2024-06-17 PROCEDURE — 99316 NF DSCHRG MGMT 30 MIN+: CPT | Performed by: NURSE PRACTITIONER

## 2024-06-17 NOTE — PROGRESS NOTES
[facility] GERIATRICS DISCHARGE SUMMARY  PATIENT'S NAME: Julisa Chaney  YOB: 1945  MEDICAL RECORD NUMBER:  [id number]  Place of Service where encounter took place:  [facility] [[id number]]    PRIMARY CARE PROVIDER AND CLINIC RESPONSIBLE AFTER TRANSFER:   Mara Murillo MD, [address] [facility] [address]    [facility] Provider     Transferring providers: DANIELLE Villanueva CNP, Clementine Berumen MD  Recent Hospitalization/ED:  Hospital  [facility] stay 5/28/24 to 6/3/24.  Date of SNF Admission: June 03, 2024  Date of SNF (anticipated) Discharge: June 21, 2024  Discharged to: previous independent home      CODE STATUS/ADVANCE DIRECTIVES DISCUSSION:  Full Code   ALLERGIES: Contrast [iohexol], Chocolate, Contrast dye, and Penicillins    NURSING FACILITY COURSE   Medication Changes/Rationale:   Tylenol #3 changed to q4h prn    Summary of nursing facility stay:   Julisa Chaney  is a 79 year old  (1945), admitted to the above facility from  [facility]. Hospital stay 5/28/24 through 6/3/24. Patient with PMH collagenous colitis and frequent hospitalizations recently for diarrhea, seizure disorder, HLD, chronic pain, trigeminal neuralgia, and GERD presented to the ED with weakness. She was admitted with sepsis due to pneumonia and pyelonephritis. Hospital course complicated by worsening neuralgia, pain not controlled. Pain team increased Tylenol #3 to q4h prn.     Physical deconditioning  Improving. Therapy has chosen a last covered day of 6/20 and her insurance agreed. She is walking 100 feet with 4WW    Trigeminal neuralgia  Upon admission to TCU, Tylenol #3 order was back to q6h prn. She felt that taking it every 4 hours controlled her pain significantly better. The order was changed again and she has been happy with this. No evidence of significant side effects    Collagenous colitis  Patient was constipated last week, possibly  related to her increased use of Tylenol #3. She says she is now having normal, daily BMs. She is advised that the budesonide is due to stop soon. If she has recurrent diarrhea, she should follow up with [facility]    Failure to thrive in adult  Patient has been at this TCU 5 times since last July. She does appear a healthier this stay than at her last few. This is likely due to improving diarrhea. That being said, detention has been recommended to her at every discharge as well as this one. Provider consistently sees her mid-morning and she is always sound asleep in bed after breakfast. She continually says that she has considered it, but she has so much stuff to go through at home      Discharge Medications:  Current Outpatient Medications   Medication Sig Dispense Refill    acetaminophen-codeine (TYLENOL #3) 300-30 MG per tablet Take 1 tablet by mouth every 4 hours as needed for severe pain 60 tablet 0    budesonide (UCERIS) 9 MG 24 hr tablet Take 1 tablet (9 mg) by mouth every morning for 30 days 30 tablet 0    ferrous sulfate (FEROSUL) 325 (65 Fe) MG tablet Take 1 tablet (325 mg) by mouth daily (with breakfast) 90 tablet 1    gabapentin (NEURONTIN) 100 MG capsule Take 100 mg by mouth daily      gabapentin (NEURONTIN) 100 MG capsule Take 300 mg by mouth at bedtime      levETIRAcetam (KEPPRA) 750 MG tablet Take 1 tablet (750 mg) by mouth 2 times daily 60 tablet 0    loperamide (IMODIUM) 2 MG capsule Take 2 mg by mouth 4 times daily as needed for diarrhea      menthol-zinc oxide (CALMOSEPTINE) 0.44-20.6 % OINT ointment Apply topically 4 times daily as needed for skin protection (for sores on buttox from sitting)      METAMUCIL FIBER PO Take 1 capsule by mouth daily      mirtazapine (REMERON) 15 MG tablet Take 1 tablet (15 mg) by mouth at bedtime 30 tablet 0    Multiple Vitamin (MULTIVITAMIN) TABS TAKE 1 TABLET BY MOUTH EVERY  tablet 3    nortriptyline (PAMELOR) 50 MG capsule Take 1 capsule (50 mg) by mouth at bedtime 90  capsule 3    OXcarbazepine (TRILEPTAL) 150 MG tablet TAKE 3 TABLETS(450 MG) BY MOUTH DAILY 270 tablet 1    pantoprazole (PROTONIX) 40 MG EC tablet Take 1 tablet (40 mg) by mouth every morning (before breakfast)      potassium chloride elin ER (KLOR-CON M20) 20 MEQ CR tablet Take 1 tablet (20 mEq) by mouth daily 90 tablet 1    QUEtiapine (SEROQUEL) 50 MG tablet Take 1 tablet (50 mg) by mouth at bedtime 30 tablet 0    topiramate (TOPAMAX) 50 MG tablet Take 1 tablet (50 mg) by mouth at bedtime 30 tablet 0    Vitamin D3 50 mcg (2000 units) tablet Take 1 tablet (50 mcg) by mouth daily 90 tablet 1        Controlled medications:   OK to send remaining Tylenol #3     Past Medical History:   Past Medical History:   Diagnosis Date    Aspiration pneumonia (H) 02/2022    Depression     High cholesterol     Migraines     Pyloric ulcer, chronic     Sepsis (H) 08/10/2020    Trigeminal neuralgia      Physical Exam:   GENERAL APPEARANCE:  Alert, in no distress, thin  EYES:  EOM normal, conjunctiva and lids normal  RESP:  no respiratory distress  CV:  no edema  ABDOMEN:  soft, non-tender  PSYCH:  oriented X 3, insight and judgement impaired, memory impaired      SNF labs: Labs done in SNF are in [place] EPIC. Please refer to them using [employer]/Care Everywhere.    DISCHARGE PLAN:  Follow up labs: No labs orders/due  Medical Follow Up:      Follow up with primary care provider in 2-3 weeks  Discharge Services: Home Care:  Occupational Therapy, Physical Therapy, Registered Nurse, and Home Health Aide      TOTAL DISCHARGE TIME:   Greater than 30 minutes  Electronically signed by:  DANIELLE Villanueva CNP       Documentation of Face to Face and Certification for Home Health Services    I certify that services are/were furnished while this patient was under the care of a physician and that a physician or an allowed non-physician practitioner (NPP), had a face-to-face encounter that meets the physician face-to-face encounter requirements.  The encounter was in whole, or in part, related to the primary reason for home health. The patient is confined to his/her home and needs intermittent skilled nursing, physical therapy, speech-language pathology, or the continued need for occupational therapy. A plan of care has been established by a physician and is periodically reviewed by a physician.  Date of Face-to-Face Encounter: 6/17/2024.    I certify that, based on my findings, the following services are medically necessary home health services: Nursing, Occupational Therapy, and Physical Therapy.    My clinical findings support the need for the above skilled services because: Requires assistance of another person or specialized equipment to access medical services because patient: Is unable to walk greater than 100 feet without rest...    Patient to re-establish plan of care with their PCP within 7-10 days after leaving the facility to reestablish care.  Medicare certified PECOS provider: DANIELLE Villanueva CNP  Date: June 17, 2024

## 2024-06-17 NOTE — LETTER
6/17/2024      Julisa Chaney  [address]  [place] [address]        [facility] GERIATRICS DISCHARGE SUMMARY  PATIENT'S NAME: Julisa Chaney  YOB: 1945  MEDICAL RECORD NUMBER:  [id number]  Place of Service where encounter took place:  [facility] ([facility]) [[id number]]    PRIMARY CARE PROVIDER AND CLINIC RESPONSIBLE AFTER TRANSFER:   Mara Murillo MD, [address] [facility] [address]    [facility] Provider     Transferring providers: DANIELLE Villanueva CNP, Clementine Berumen MD  Recent Hospitalization/ED:  Hospital  [facility] stay 5/28/24 to 6/3/24.  Date of SNF Admission: June 03, 2024  Date of SNF (anticipated) Discharge: June 21, 2024  Discharged to: previous independent home      CODE STATUS/ADVANCE DIRECTIVES DISCUSSION:  Full Code   ALLERGIES: Contrast [iohexol], Chocolate, Contrast dye, and Penicillins    NURSING FACILITY COURSE   Medication Changes/Rationale:   Tylenol #3 changed to q4h prn    Summary of nursing facility stay:   Julisa Chaney  is a 79 year old  (1945), admitted to the above facility from  [facility]. Hospital stay 5/28/24 through 6/3/24. Patient with PMH collagenous colitis and frequent hospitalizations recently for diarrhea, seizure disorder, HLD, chronic pain, trigeminal neuralgia, and GERD presented to the ED with weakness. She was admitted with sepsis due to pneumonia and pyelonephritis. Hospital course complicated by worsening neuralgia, pain not controlled. Pain team increased Tylenol #3 to q4h prn.     Physical deconditioning  Improving. Therapy has chosen a last covered day of 6/20 and her insurance agreed. She is walking 100 feet with 4WW    Trigeminal neuralgia  Upon admission to TCU, Tylenol #3 order was back to q6h prn. She felt that taking it every 4 hours controlled her pain significantly better. The order was changed again and she has been happy with this. No evidence of  significant side effects    Collagenous colitis  Patient was constipated last week, possibly related to her increased use of Tylenol #3. She says she is now having normal, daily BMs. She is advised that the budesonide is due to stop soon. If she has recurrent diarrhea, she should follow up with [facility]    Failure to thrive in adult  Patient has been at this TCU 5 times since last July. She does appear a healthier this stay than at her last few. This is likely due to improving diarrhea. That being said, PHIL has been recommended to her at every discharge as well as this one. Provider consistently sees her mid-morning and she is always sound asleep in bed after breakfast. She continually says that she has considered it, but she has so much stuff to go through at home      Discharge Medications:  Current Outpatient Medications   Medication Sig Dispense Refill     acetaminophen-codeine (TYLENOL #3) 300-30 MG per tablet Take 1 tablet by mouth every 4 hours as needed for severe pain 60 tablet 0     budesonide (UCERIS) 9 MG 24 hr tablet Take 1 tablet (9 mg) by mouth every morning for 30 days 30 tablet 0     ferrous sulfate (FEROSUL) 325 (65 Fe) MG tablet Take 1 tablet (325 mg) by mouth daily (with breakfast) 90 tablet 1     gabapentin (NEURONTIN) 100 MG capsule Take 100 mg by mouth daily       gabapentin (NEURONTIN) 100 MG capsule Take 300 mg by mouth at bedtime       levETIRAcetam (KEPPRA) 750 MG tablet Take 1 tablet (750 mg) by mouth 2 times daily 60 tablet 0     loperamide (IMODIUM) 2 MG capsule Take 2 mg by mouth 4 times daily as needed for diarrhea       menthol-zinc oxide (CALMOSEPTINE) 0.44-20.6 % OINT ointment Apply topically 4 times daily as needed for skin protection (for sores on buttox from sitting)       METAMUCIL FIBER PO Take 1 capsule by mouth daily       mirtazapine (REMERON) 15 MG tablet Take 1 tablet (15 mg) by mouth at bedtime 30 tablet 0     Multiple Vitamin (MULTIVITAMIN) TABS TAKE 1 TABLET BY MOUTH EVERY   tablet 3     nortriptyline (PAMELOR) 50 MG capsule Take 1 capsule (50 mg) by mouth at bedtime 90 capsule 3     OXcarbazepine (TRILEPTAL) 150 MG tablet TAKE 3 TABLETS(450 MG) BY MOUTH DAILY 270 tablet 1     pantoprazole (PROTONIX) 40 MG EC tablet Take 1 tablet (40 mg) by mouth every morning (before breakfast)       potassium chloride elin ER (KLOR-CON M20) 20 MEQ CR tablet Take 1 tablet (20 mEq) by mouth daily 90 tablet 1     QUEtiapine (SEROQUEL) 50 MG tablet Take 1 tablet (50 mg) by mouth at bedtime 30 tablet 0     topiramate (TOPAMAX) 50 MG tablet Take 1 tablet (50 mg) by mouth at bedtime 30 tablet 0     Vitamin D3 50 mcg (2000 units) tablet Take 1 tablet (50 mcg) by mouth daily 90 tablet 1        Controlled medications:   OK to send remaining Tylenol #3     Past Medical History:   Past Medical History:   Diagnosis Date     Aspiration pneumonia (H) 02/2022     Depression      High cholesterol      Migraines      Pyloric ulcer, chronic      Sepsis (H) 08/10/2020     Trigeminal neuralgia      Physical Exam:   GENERAL APPEARANCE:  Alert, in no distress, thin  EYES:  EOM normal, conjunctiva and lids normal  RESP:  no respiratory distress  CV:  no edema  ABDOMEN:  soft, non-tender  PSYCH:  oriented X 3, insight and judgement impaired, memory impaired      SNF labs: Labs done in SNF are in [place] EPIC. Please refer to them using [employer]/Care Everywhere.    DISCHARGE PLAN:  Follow up labs: No labs orders/due  Medical Follow Up:      Follow up with primary care provider in 2-3 weeks  Discharge Services: Home Care:  Occupational Therapy, Physical Therapy, Registered Nurse, and Home Health Aide      TOTAL DISCHARGE TIME:   Greater than 30 minutes  Electronically signed by:  DANIELLE Villanueva CNP       Documentation of Face to Face and Certification for Home Health Services    I certify that services are/were furnished while this patient was under the care of a physician and that a physician or an allowed  non-physician practitioner (NPP), had a face-to-face encounter that meets the physician face-to-face encounter requirements. The encounter was in whole, or in part, related to the primary reason for home health. The patient is confined to his/her home and needs intermittent skilled nursing, physical therapy, speech-language pathology, or the continued need for occupational therapy. A plan of care has been established by a physician and is periodically reviewed by a physician.  Date of Face-to-Face Encounter: 6/17/2024.    I certify that, based on my findings, the following services are medically necessary home health services: Nursing, Occupational Therapy, and Physical Therapy.    My clinical findings support the need for the above skilled services because: Requires assistance of another person or specialized equipment to access medical services because patient: Is unable to walk greater than 100 feet without rest...    Patient to re-establish plan of care with their PCP within 7-10 days after leaving the facility to reestablish care.  Medicare certified PECOS provider: DANIELLE Villanueva CNP  Date: June 17, 2024                  Sincerely,        DANIELLE Villanueva CNP

## 2024-06-18 NOTE — TELEPHONE ENCOUNTER
Problem: Adult Inpatient Plan of Care  Goal: Absence of Hospital-Acquired Illness or Injury  Intervention: Prevent Infection  Recent Flowsheet Documentation  Taken 6/17/2024 1630 by Mirna Blackwell RN  Infection Prevention:   hand hygiene promoted   rest/sleep promoted   Scheduled antibiotics given.  Problem: Risk for Delirium  Goal: Improved Behavioral Control  Outcome: Progressing  Intervention: Minimize Safety Risk  Recent Flowsheet Documentation  Taken 6/17/2024 1630 by Mirna Blackwell RN  Communication Enhancement Strategies: call light answered in person   Pt has been pleasant, alert but forgetful at times.  Problem: Comorbidity Management  Goal: Blood Glucose Levels Within Targeted Range  Outcome: Progressing   LC=072 and 182  Problem: Hypertension Acute  Goal: Blood Pressure Within Desired Range  Outcome: Progressing  Intervention: Normalize Blood Pressure  Recent Flowsheet Documentation  Taken 6/17/2024 1630 by Mirna Blackwell RN  Sensory Stimulation Regulation: care clustered  WK=006/64 and 136/65. Scheduled  amlodipine given.                         CSA done 07/2018 (up to date but needs to resign the new CSA when in clinic).

## 2024-06-21 NOTE — TELEPHONE ENCOUNTER
RN called [facility] - acetaminophen-codeine (TYLENOL #3) 300-30 MG tablet s, 30 tabs sent to facility on 6/5/24 and 26 tabs sent to facility on 6/13/24.  Patient has acetaminophen-codeine (TYLENOL #3) 300-30 MG 4 tabs left for facility. Patient discharged from SNF today, 6/21/24    Attempted to contact both patient and patient's daughter to scheduling a follow up office visit in 2-3 weeks, no answer.     Sending to PCP for review.  Please review and advise on patient MyChart message.    Routing pended medication request to PCP. Patient wanting #120 tabs, only 30 tabs pended.

## 2024-06-24 RX ORDER — ACETAMINOPHEN AND CODEINE PHOSPHATE 300; 30 MG/1; MG/1
1 TABLET ORAL EVERY 4 HOURS PRN
Qty: 60 TABLET | Refills: 0 | Status: SHIPPED | OUTPATIENT
Start: 2024-06-24 | End: 2024-06-28

## 2024-06-25 ENCOUNTER — TELEPHONE (OUTPATIENT)
Dept: FAMILY MEDICINE | Facility: CLINIC | Age: 79
End: 2024-06-25
Payer: COMMERCIAL

## 2024-06-25 NOTE — TELEPHONE ENCOUNTER
Medication Question or Refill    Contacts       Contact Date/Time Type Contact Phone/Fax    06/25/2024 12:14 PM CDT Phone (Incoming) Alissa Del Real (Emergency Contact) [phone] (M)            What medication are you calling about (include dose and sig)?: acetaminophen-codeine (TYLENOL #3) 300-30 MG per tablet     Preferred Pharmacy:   [id number] - [place], [place] - [address] AT [facility] [address]  [address]  [place] [address]  Phone: [phone] Fax: [phone]      Controlled Substance Agreement on file:   CSA -- Patient Level:     [Media Unavailable] Controlled Substance Agreement - Opioid - Scan on 1/11/2024  5:19 PM   [Media Unavailable] Controlled Substance Agreement - Opioid - Scan on 7/11/2018   [Media Unavailable] Controlled Substance Agreement - Opioid - Scan on 9/12/2016       Who prescribed the medication?: Dr. Murillo     Do you need a refill? No    When did you use the medication last? Today.    Patient offered an appointment? No    Do you have any questions or concerns?  Yes: Daughter calling regarding the dispensed quantity of the medication. She said that patient takes 3 tablets a day every 6 hours. The 60 tablets dispensed will only get then about 20 days. She is requesting the dispense amount get updated to 90 tablets so they are covered for 1 month, and that the pt sig gets changed to every 6 hours not every 4 hours. Patient just got out of transitional care unit.       Could we send this information to you in [facility] or would you prefer to receive a phone call?:   Patient would prefer a phone call   Okay to leave a detailed message?: Yes at Other phone number:  Hank Maxwell- [phone]

## 2024-06-26 NOTE — TELEPHONE ENCOUNTER
RN called and advised patient's daughter of Dr. Murillo's message.  Patient's, daughter verbalized understanding and in agreement with plan.

## 2024-06-27 ENCOUNTER — MYC REFILL (OUTPATIENT)
Dept: FAMILY MEDICINE | Facility: CLINIC | Age: 79
End: 2024-06-27
Payer: COMMERCIAL

## 2024-06-27 ENCOUNTER — TELEPHONE (OUTPATIENT)
Dept: FAMILY MEDICINE | Facility: CLINIC | Age: 79
End: 2024-06-27
Payer: COMMERCIAL

## 2024-06-27 ENCOUNTER — MYC MEDICAL ADVICE (OUTPATIENT)
Dept: FAMILY MEDICINE | Facility: CLINIC | Age: 79
End: 2024-06-27
Payer: COMMERCIAL

## 2024-06-27 DIAGNOSIS — F32.0 CURRENT MILD EPISODE OF MAJOR DEPRESSIVE DISORDER WITHOUT PRIOR EPISODE (H): ICD-10-CM

## 2024-06-27 DIAGNOSIS — G47.00 PERSISTENT INSOMNIA: ICD-10-CM

## 2024-06-27 DIAGNOSIS — G50.0 TRIGEMINAL NEURALGIA: ICD-10-CM

## 2024-06-27 DIAGNOSIS — G43.719 INTRACTABLE CHRONIC MIGRAINE WITHOUT AURA AND WITHOUT STATUS MIGRAINOSUS: ICD-10-CM

## 2024-06-27 DIAGNOSIS — G89.4 CHRONIC PAIN SYNDROME: ICD-10-CM

## 2024-06-27 DIAGNOSIS — G89.4 CHRONIC PAIN SYNDROME: Primary | ICD-10-CM

## 2024-06-27 DIAGNOSIS — E55.9 VITAMIN D DEFICIENCY: ICD-10-CM

## 2024-06-27 RX ORDER — TOPIRAMATE 50 MG/1
50 TABLET, FILM COATED ORAL AT BEDTIME
Qty: 30 TABLET | Refills: 0 | Status: SHIPPED | OUTPATIENT
Start: 2024-06-27 | End: 2024-07-29

## 2024-06-27 RX ORDER — ACETAMINOPHEN AND CODEINE PHOSPHATE 300; 30 MG/1; MG/1
1 TABLET ORAL EVERY 4 HOURS PRN
Qty: 60 TABLET | Refills: 0 | Status: CANCELLED | OUTPATIENT
Start: 2024-06-27

## 2024-06-27 RX ORDER — NORTRIPTYLINE HYDROCHLORIDE 50 MG/1
50 CAPSULE ORAL AT BEDTIME
Qty: 90 CAPSULE | Refills: 3 | OUTPATIENT
Start: 2024-06-27

## 2024-06-27 RX ORDER — TOPIRAMATE 50 MG/1
50 TABLET, FILM COATED ORAL AT BEDTIME
Qty: 90 TABLET | OUTPATIENT
Start: 2024-06-27

## 2024-06-27 RX ORDER — LEVETIRACETAM 750 MG/1
750 TABLET ORAL 2 TIMES DAILY
Qty: 60 TABLET | Refills: 0 | Status: SHIPPED | OUTPATIENT
Start: 2024-06-27 | End: 2024-07-29

## 2024-06-27 RX ORDER — LEVETIRACETAM 750 MG/1
750 TABLET ORAL 2 TIMES DAILY
Qty: 180 TABLET | OUTPATIENT
Start: 2024-06-27

## 2024-06-27 RX ORDER — CHOLECALCIFEROL (VITAMIN D3) 50 MCG
1 TABLET ORAL DAILY
Qty: 90 TABLET | Refills: 1 | Status: SHIPPED | OUTPATIENT
Start: 2024-06-27

## 2024-06-27 RX ORDER — MIRTAZAPINE 15 MG/1
15 TABLET, FILM COATED ORAL AT BEDTIME
Qty: 30 TABLET | Refills: 0 | Status: SHIPPED | OUTPATIENT
Start: 2024-06-27 | End: 2024-07-29

## 2024-06-27 NOTE — TELEPHONE ENCOUNTER
Pt sent MyChart message that they did not mean to send refill request in for tylenol with codeine yet.    Refill request cancelled.    Flaquita Tee RN

## 2024-06-27 NOTE — TELEPHONE ENCOUNTER
Contacts       Contact Date/Time Type Contact Phone/Fax    06/27/2024 03:13 PM CDT Phone (Incoming) [facility] [phone]          Reason for Call:  FYI    What are your questions or concerns:  Patients daughter is declining home care services for patient following hospital discharge.

## 2024-06-28 RX ORDER — ACETAMINOPHEN AND CODEINE PHOSPHATE 300; 30 MG/1; MG/1
1 TABLET ORAL EVERY 4 HOURS PRN
Qty: 90 TABLET | Refills: 0 | Status: SHIPPED | OUTPATIENT
Start: 2024-07-24 | End: 2024-08-11

## 2024-07-08 ENCOUNTER — MYC REFILL (OUTPATIENT)
Dept: FAMILY MEDICINE | Facility: CLINIC | Age: 79
End: 2024-07-08
Payer: COMMERCIAL

## 2024-07-08 ENCOUNTER — MYC MEDICAL ADVICE (OUTPATIENT)
Dept: FAMILY MEDICINE | Facility: CLINIC | Age: 79
End: 2024-07-08
Payer: COMMERCIAL

## 2024-07-08 DIAGNOSIS — G51.8 FACIAL NEURALGIA: ICD-10-CM

## 2024-07-08 DIAGNOSIS — F41.9 ANXIETY: ICD-10-CM

## 2024-07-08 RX ORDER — QUETIAPINE FUMARATE 50 MG/1
50 TABLET, FILM COATED ORAL AT BEDTIME
Qty: 30 TABLET | Refills: 0 | Status: SHIPPED | OUTPATIENT
Start: 2024-07-08 | End: 2024-09-13

## 2024-07-08 RX ORDER — OXCARBAZEPINE 150 MG/1
TABLET, FILM COATED ORAL
Qty: 270 TABLET | Refills: 1 | Status: SHIPPED | OUTPATIENT
Start: 2024-07-08

## 2024-07-08 NOTE — TELEPHONE ENCOUNTER
Refill request is pending in another encounter.  Please see My Chart Message:  Patient is taking 3-4 tabs daily.      acetaminophen-codeine (TYLENOL #3) 300-30 MG per tablet (Future)   Start: 07/24/2024 6/24/2024  acetaminophen-codeine (TYLENOL #3) 300-30 MG per tablet (Discontinued) 60 tablet

## 2024-07-08 NOTE — TELEPHONE ENCOUNTER
I had send her next month refill already as post date Rx for 90 tab, please check with pharmacy  Mara Murillo MD

## 2024-07-09 NOTE — TELEPHONE ENCOUNTER
RN called pharmacy to confirm they have a future order for acetaminophen-codeine (Tylenol #3) 90 tabs to be filled on 7/24/24.     Nicolle Chatterjee RN

## 2024-07-12 RX ORDER — ACETAMINOPHEN AND CODEINE PHOSPHATE 300; 30 MG/1; MG/1
1 TABLET ORAL EVERY 4 HOURS PRN
Qty: 30 TABLET | Refills: 0 | Status: SHIPPED | OUTPATIENT
Start: 2024-07-12 | End: 2024-07-15

## 2024-07-12 NOTE — TELEPHONE ENCOUNTER
See MyChart from Patient needing PCP reponse.    RN called pharmacy last fill was 6/25 patient filled 60 tabs they said was a 10 day supply.    Order in chart they are not able to fill until July 24th.    Patient states she only has 4 tabs left.      ABRIL Love  [facility]

## 2024-07-12 NOTE — TELEPHONE ENCOUNTER
Medication Question or Refill      What medication are you calling about (include dose and sig)?:     Acetaminophen-codeine (Tylenol #3) 300-30 mg per tablet    Preferred Pharmacy:   [id number]  [address] [place], [place]       Controlled Substance Agreement on file:   CSA -- Patient Level:     [Media Unavailable] Controlled Substance Agreement - Opioid - Scan on 1/11/2024  5:19 PM   [Media Unavailable] Controlled Substance Agreement - Opioid - Scan on 7/11/2018   [Media Unavailable] Controlled Substance Agreement - Opioid - Scan on 9/12/2016       Who prescribed the medication?: Dr. Murillo    Do you need a refill? Yes    When did you use the medication last? Today    Patient offered an appointment? No    Do you have any questions or concerns?  Yes: Patient was last prescribed 60 tablets on 6/24/24 but patient takes 3 pills a day and therefore this prescription will be out tomorrow, 7/13. Patient is requesting a bridge of 30 more tablets (10 days) to get her to 7/24/24 when her next refill of 90 tablets is due for pick-up.

## 2024-07-29 ENCOUNTER — MYC REFILL (OUTPATIENT)
Dept: FAMILY MEDICINE | Facility: CLINIC | Age: 79
End: 2024-07-29
Payer: COMMERCIAL

## 2024-07-29 DIAGNOSIS — G47.00 PERSISTENT INSOMNIA: ICD-10-CM

## 2024-07-29 DIAGNOSIS — D50.0 IRON DEFICIENCY ANEMIA DUE TO CHRONIC BLOOD LOSS: ICD-10-CM

## 2024-07-29 DIAGNOSIS — G43.719 INTRACTABLE CHRONIC MIGRAINE WITHOUT AURA AND WITHOUT STATUS MIGRAINOSUS: ICD-10-CM

## 2024-07-29 DIAGNOSIS — F32.0 CURRENT MILD EPISODE OF MAJOR DEPRESSIVE DISORDER WITHOUT PRIOR EPISODE (H): ICD-10-CM

## 2024-07-29 DIAGNOSIS — F41.9 ANXIETY: ICD-10-CM

## 2024-07-29 RX ORDER — FERROUS SULFATE 325(65) MG
325 TABLET ORAL
Qty: 90 TABLET | Refills: 1 | Status: SHIPPED | OUTPATIENT
Start: 2024-07-29

## 2024-07-29 RX ORDER — TOPIRAMATE 50 MG/1
50 TABLET, FILM COATED ORAL AT BEDTIME
Qty: 90 TABLET | OUTPATIENT
Start: 2024-07-29

## 2024-07-29 RX ORDER — LEVETIRACETAM 750 MG/1
750 TABLET ORAL 2 TIMES DAILY
Qty: 60 TABLET | Refills: 0 | Status: SHIPPED | OUTPATIENT
Start: 2024-07-29 | End: 2024-09-26

## 2024-07-29 RX ORDER — POTASSIUM CHLORIDE 1500 MG/1
20 TABLET, EXTENDED RELEASE ORAL DAILY
Qty: 90 TABLET | Refills: 1 | Status: SHIPPED | OUTPATIENT
Start: 2024-07-29

## 2024-07-29 RX ORDER — TOPIRAMATE 50 MG/1
50 TABLET, FILM COATED ORAL AT BEDTIME
Qty: 30 TABLET | Refills: 0 | Status: SHIPPED | OUTPATIENT
Start: 2024-07-29 | End: 2024-09-09

## 2024-07-29 RX ORDER — MIRTAZAPINE 15 MG/1
15 TABLET, FILM COATED ORAL AT BEDTIME
Qty: 30 TABLET | Refills: 0 | Status: SHIPPED | OUTPATIENT
Start: 2024-07-29 | End: 2024-09-26

## 2024-08-11 ENCOUNTER — APPOINTMENT (OUTPATIENT)
Dept: CT IMAGING | Facility: HOSPITAL | Age: 79
DRG: 871 | End: 2024-08-11
Attending: EMERGENCY MEDICINE
Payer: COMMERCIAL

## 2024-08-11 ENCOUNTER — HOSPITAL ENCOUNTER (INPATIENT)
Facility: HOSPITAL | Age: 79
LOS: 9 days | Discharge: NURSING, REHAB OR LONG-TERM CARE | DRG: 871 | End: 2024-08-20
Attending: EMERGENCY MEDICINE | Admitting: INTERNAL MEDICINE
Payer: COMMERCIAL

## 2024-08-11 DIAGNOSIS — J96.01 ACUTE RESPIRATORY FAILURE WITH HYPOXIA (H): ICD-10-CM

## 2024-08-11 DIAGNOSIS — J69.0 ASPIRATION PNEUMONIA OF RIGHT LUNG, UNSPECIFIED ASPIRATION PNEUMONIA TYPE, UNSPECIFIED PART OF LUNG (H): Primary | ICD-10-CM

## 2024-08-11 PROBLEM — R63.6 UNDERWEIGHT: Status: ACTIVE | Noted: 2024-08-11

## 2024-08-11 LAB
ANION GAP SERPL CALCULATED.3IONS-SCNC: 14 MMOL/L (ref 7–15)
BASE EXCESS BLDV CALC-SCNC: 3.4 MMOL/L (ref -3–3)
BASOPHILS # BLD AUTO: 0.1 10E3/UL (ref 0–0.2)
BASOPHILS NFR BLD AUTO: 0 %
BUN SERPL-MCNC: 12.9 MG/DL (ref 8–23)
CALCIUM SERPL-MCNC: 8.7 MG/DL (ref 8.8–10.4)
CHLORIDE SERPL-SCNC: 100 MMOL/L (ref 98–107)
CREAT SERPL-MCNC: 0.53 MG/DL (ref 0.51–0.95)
EGFRCR SERPLBLD CKD-EPI 2021: >90 ML/MIN/1.73M2
EOSINOPHIL # BLD AUTO: 0 10E3/UL (ref 0–0.7)
EOSINOPHIL NFR BLD AUTO: 0 %
ERYTHROCYTE [DISTWIDTH] IN BLOOD BY AUTOMATED COUNT: 15 % (ref 10–15)
FLUAV RNA SPEC QL NAA+PROBE: NEGATIVE
FLUBV RNA RESP QL NAA+PROBE: NEGATIVE
GLUCOSE SERPL-MCNC: 138 MG/DL (ref 70–99)
HCO3 BLDV-SCNC: 29 MMOL/L (ref 21–28)
HCO3 SERPL-SCNC: 21 MMOL/L (ref 22–29)
HCT VFR BLD AUTO: 42.6 % (ref 35–47)
HGB BLD-MCNC: 13.2 G/DL (ref 11.7–15.7)
IMM GRANULOCYTES # BLD: 0.2 10E3/UL
IMM GRANULOCYTES NFR BLD: 1 %
LACTATE SERPL-SCNC: 1.8 MMOL/L (ref 0.7–2)
LYMPHOCYTES # BLD AUTO: 0.9 10E3/UL (ref 0.8–5.3)
LYMPHOCYTES NFR BLD AUTO: 3 %
MCH RBC QN AUTO: 26.4 PG (ref 26.5–33)
MCHC RBC AUTO-ENTMCNC: 31 G/DL (ref 31.5–36.5)
MCV RBC AUTO: 85 FL (ref 78–100)
MONOCYTES # BLD AUTO: 1.4 10E3/UL (ref 0–1.3)
MONOCYTES NFR BLD AUTO: 5 %
NEUTROPHILS # BLD AUTO: 26.5 10E3/UL (ref 1.6–8.3)
NEUTROPHILS NFR BLD AUTO: 91 %
NRBC # BLD AUTO: 0 10E3/UL
NRBC BLD AUTO-RTO: 0 /100
NT-PROBNP SERPL-MCNC: 317 PG/ML (ref 0–1800)
O2/TOTAL GAS SETTING VFR VENT: 28 %
OXYHGB MFR BLDV: 39 % (ref 70–75)
PCO2 BLDV: 56 MM HG (ref 40–50)
PH BLDV: 7.33 [PH] (ref 7.32–7.43)
PLATELET # BLD AUTO: 299 10E3/UL (ref 150–450)
PO2 BLDV: 25 MM HG (ref 25–47)
POTASSIUM SERPL-SCNC: 4.4 MMOL/L (ref 3.4–5.3)
PROCALCITONIN SERPL IA-MCNC: 0.3 NG/ML
RBC # BLD AUTO: 5 10E6/UL (ref 3.8–5.2)
RSV RNA SPEC NAA+PROBE: NEGATIVE
SAO2 % BLDV: 39.6 % (ref 70–75)
SARS-COV-2 RNA RESP QL NAA+PROBE: NEGATIVE
SODIUM SERPL-SCNC: 135 MMOL/L (ref 135–145)
TROPONIN T SERPL HS-MCNC: 65 NG/L
WBC # BLD AUTO: 29 10E3/UL (ref 4–11)

## 2024-08-11 PROCEDURE — 83605 ASSAY OF LACTIC ACID: CPT | Performed by: EMERGENCY MEDICINE

## 2024-08-11 PROCEDURE — 84484 ASSAY OF TROPONIN QUANT: CPT | Performed by: EMERGENCY MEDICINE

## 2024-08-11 PROCEDURE — 258N000003 HC RX IP 258 OP 636: Performed by: EMERGENCY MEDICINE

## 2024-08-11 PROCEDURE — 80048 BASIC METABOLIC PNL TOTAL CA: CPT | Performed by: EMERGENCY MEDICINE

## 2024-08-11 PROCEDURE — 93005 ELECTROCARDIOGRAM TRACING: CPT | Performed by: EMERGENCY MEDICINE

## 2024-08-11 PROCEDURE — 36415 COLL VENOUS BLD VENIPUNCTURE: CPT | Performed by: EMERGENCY MEDICINE

## 2024-08-11 PROCEDURE — 71250 CT THORAX DX C-: CPT

## 2024-08-11 PROCEDURE — 120N000001 HC R&B MED SURG/OB

## 2024-08-11 PROCEDURE — 83880 ASSAY OF NATRIURETIC PEPTIDE: CPT | Performed by: EMERGENCY MEDICINE

## 2024-08-11 PROCEDURE — 82805 BLOOD GASES W/O2 SATURATION: CPT | Performed by: EMERGENCY MEDICINE

## 2024-08-11 PROCEDURE — 84145 PROCALCITONIN (PCT): CPT | Performed by: EMERGENCY MEDICINE

## 2024-08-11 PROCEDURE — 99285 EMERGENCY DEPT VISIT HI MDM: CPT | Mod: 25

## 2024-08-11 PROCEDURE — 96360 HYDRATION IV INFUSION INIT: CPT

## 2024-08-11 PROCEDURE — 250N000011 HC RX IP 250 OP 636: Performed by: INTERNAL MEDICINE

## 2024-08-11 PROCEDURE — 250N000013 HC RX MED GY IP 250 OP 250 PS 637: Performed by: INTERNAL MEDICINE

## 2024-08-11 PROCEDURE — 250N000011 HC RX IP 250 OP 636: Performed by: EMERGENCY MEDICINE

## 2024-08-11 PROCEDURE — 85025 COMPLETE CBC W/AUTO DIFF WBC: CPT | Performed by: EMERGENCY MEDICINE

## 2024-08-11 PROCEDURE — 999N000157 HC STATISTIC RCP TIME EA 10 MIN

## 2024-08-11 PROCEDURE — 99223 1ST HOSP IP/OBS HIGH 75: CPT | Performed by: INTERNAL MEDICINE

## 2024-08-11 PROCEDURE — 87637 SARSCOV2&INF A&B&RSV AMP PRB: CPT | Performed by: EMERGENCY MEDICINE

## 2024-08-11 RX ORDER — LANOLIN ALCOHOL/MO/W.PET/CERES
3 CREAM (GRAM) TOPICAL
Status: DISCONTINUED | OUTPATIENT
Start: 2024-08-11 | End: 2024-08-20 | Stop reason: HOSPADM

## 2024-08-11 RX ORDER — OXCARBAZEPINE 150 MG/1
450 TABLET, FILM COATED ORAL EVERY EVENING
Status: DISCONTINUED | OUTPATIENT
Start: 2024-08-11 | End: 2024-08-20 | Stop reason: HOSPADM

## 2024-08-11 RX ORDER — TOPIRAMATE 25 MG/1
50 TABLET, FILM COATED ORAL AT BEDTIME
Status: DISCONTINUED | OUTPATIENT
Start: 2024-08-11 | End: 2024-08-20 | Stop reason: HOSPADM

## 2024-08-11 RX ORDER — SODIUM CHLORIDE FOR INHALATION 7 %
4 VIAL, NEBULIZER (ML) INHALATION
Status: DISCONTINUED | OUTPATIENT
Start: 2024-08-11 | End: 2024-08-15

## 2024-08-11 RX ORDER — GUAIFENESIN 600 MG/1
1200 TABLET, EXTENDED RELEASE ORAL 2 TIMES DAILY
Status: DISCONTINUED | OUTPATIENT
Start: 2024-08-11 | End: 2024-08-16

## 2024-08-11 RX ORDER — NALOXONE HYDROCHLORIDE 0.4 MG/ML
0.2 INJECTION, SOLUTION INTRAMUSCULAR; INTRAVENOUS; SUBCUTANEOUS
Status: DISCONTINUED | OUTPATIENT
Start: 2024-08-11 | End: 2024-08-20 | Stop reason: HOSPADM

## 2024-08-11 RX ORDER — POTASSIUM CHLORIDE 1500 MG/1
20 TABLET, EXTENDED RELEASE ORAL DAILY
Status: DISCONTINUED | OUTPATIENT
Start: 2024-08-12 | End: 2024-08-20 | Stop reason: HOSPADM

## 2024-08-11 RX ORDER — ACETAMINOPHEN AND CODEINE PHOSPHATE 300; 30 MG/1; MG/1
1 TABLET ORAL EVERY 4 HOURS PRN
Status: DISCONTINUED | OUTPATIENT
Start: 2024-08-11 | End: 2024-08-11

## 2024-08-11 RX ORDER — NORTRIPTYLINE HYDROCHLORIDE 50 MG/1
50 CAPSULE ORAL AT BEDTIME
Status: DISCONTINUED | OUTPATIENT
Start: 2024-08-11 | End: 2024-08-20 | Stop reason: HOSPADM

## 2024-08-11 RX ORDER — ENOXAPARIN SODIUM 100 MG/ML
30 INJECTION SUBCUTANEOUS EVERY 24 HOURS
Status: DISCONTINUED | OUTPATIENT
Start: 2024-08-11 | End: 2024-08-20 | Stop reason: HOSPADM

## 2024-08-11 RX ORDER — NALOXONE HYDROCHLORIDE 0.4 MG/ML
0.4 INJECTION, SOLUTION INTRAMUSCULAR; INTRAVENOUS; SUBCUTANEOUS
Status: DISCONTINUED | OUTPATIENT
Start: 2024-08-11 | End: 2024-08-20 | Stop reason: HOSPADM

## 2024-08-11 RX ORDER — AMPICILLIN AND SULBACTAM 2; 1 G/1; G/1
3 INJECTION, POWDER, FOR SOLUTION INTRAMUSCULAR; INTRAVENOUS ONCE
Status: COMPLETED | OUTPATIENT
Start: 2024-08-11 | End: 2024-08-11

## 2024-08-11 RX ORDER — FERROUS SULFATE 325(65) MG
325 TABLET ORAL
Status: DISCONTINUED | OUTPATIENT
Start: 2024-08-12 | End: 2024-08-17

## 2024-08-11 RX ORDER — VITAMIN B COMPLEX
50 TABLET ORAL DAILY
Status: DISCONTINUED | OUTPATIENT
Start: 2024-08-12 | End: 2024-08-20 | Stop reason: HOSPADM

## 2024-08-11 RX ORDER — ACETAMINOPHEN 650 MG/1
650 SUPPOSITORY RECTAL EVERY 4 HOURS PRN
Status: DISCONTINUED | OUTPATIENT
Start: 2024-08-11 | End: 2024-08-20 | Stop reason: HOSPADM

## 2024-08-11 RX ORDER — IPRATROPIUM BROMIDE AND ALBUTEROL SULFATE 2.5; .5 MG/3ML; MG/3ML
3 SOLUTION RESPIRATORY (INHALATION)
Status: DISCONTINUED | OUTPATIENT
Start: 2024-08-11 | End: 2024-08-15

## 2024-08-11 RX ORDER — AMOXICILLIN 250 MG
1 CAPSULE ORAL 2 TIMES DAILY PRN
Status: DISCONTINUED | OUTPATIENT
Start: 2024-08-11 | End: 2024-08-20 | Stop reason: HOSPADM

## 2024-08-11 RX ORDER — LIDOCAINE 40 MG/G
CREAM TOPICAL
Status: DISCONTINUED | OUTPATIENT
Start: 2024-08-11 | End: 2024-08-20 | Stop reason: HOSPADM

## 2024-08-11 RX ORDER — MIRTAZAPINE 7.5 MG/1
15 TABLET, FILM COATED ORAL AT BEDTIME
Status: DISCONTINUED | OUTPATIENT
Start: 2024-08-11 | End: 2024-08-20 | Stop reason: HOSPADM

## 2024-08-11 RX ORDER — LOPERAMIDE HCL 2 MG
2 CAPSULE ORAL 4 TIMES DAILY PRN
Status: DISCONTINUED | OUTPATIENT
Start: 2024-08-11 | End: 2024-08-20 | Stop reason: HOSPADM

## 2024-08-11 RX ORDER — ACETAMINOPHEN 325 MG/1
650 TABLET ORAL EVERY 4 HOURS PRN
Status: DISCONTINUED | OUTPATIENT
Start: 2024-08-11 | End: 2024-08-20 | Stop reason: HOSPADM

## 2024-08-11 RX ORDER — GABAPENTIN 300 MG/1
300 CAPSULE ORAL AT BEDTIME
Status: DISCONTINUED | OUTPATIENT
Start: 2024-08-11 | End: 2024-08-20 | Stop reason: HOSPADM

## 2024-08-11 RX ORDER — AMOXICILLIN 250 MG
2 CAPSULE ORAL 2 TIMES DAILY PRN
Status: DISCONTINUED | OUTPATIENT
Start: 2024-08-11 | End: 2024-08-20 | Stop reason: HOSPADM

## 2024-08-11 RX ORDER — GABAPENTIN 100 MG/1
100 CAPSULE ORAL DAILY
Status: DISCONTINUED | OUTPATIENT
Start: 2024-08-12 | End: 2024-08-20 | Stop reason: HOSPADM

## 2024-08-11 RX ORDER — PYRITHIONE ZINC 1 G/ML
LOTION/SHAMPOO TOPICAL DAILY
Status: DISCONTINUED | OUTPATIENT
Start: 2024-08-11 | End: 2024-08-11

## 2024-08-11 RX ORDER — QUETIAPINE FUMARATE 25 MG/1
50 TABLET, FILM COATED ORAL AT BEDTIME
Status: DISCONTINUED | OUTPATIENT
Start: 2024-08-11 | End: 2024-08-20 | Stop reason: HOSPADM

## 2024-08-11 RX ORDER — ONDANSETRON 2 MG/ML
4 INJECTION INTRAMUSCULAR; INTRAVENOUS EVERY 6 HOURS PRN
Status: DISCONTINUED | OUTPATIENT
Start: 2024-08-11 | End: 2024-08-20 | Stop reason: HOSPADM

## 2024-08-11 RX ORDER — PANTOPRAZOLE SODIUM 20 MG/1
40 TABLET, DELAYED RELEASE ORAL
Status: DISCONTINUED | OUTPATIENT
Start: 2024-08-12 | End: 2024-08-20 | Stop reason: HOSPADM

## 2024-08-11 RX ORDER — ONDANSETRON 4 MG/1
4 TABLET, ORALLY DISINTEGRATING ORAL EVERY 6 HOURS PRN
Status: DISCONTINUED | OUTPATIENT
Start: 2024-08-11 | End: 2024-08-20 | Stop reason: HOSPADM

## 2024-08-11 RX ORDER — CALCIUM CARBONATE 500 MG/1
1000 TABLET, CHEWABLE ORAL 4 TIMES DAILY PRN
Status: DISCONTINUED | OUTPATIENT
Start: 2024-08-11 | End: 2024-08-20 | Stop reason: HOSPADM

## 2024-08-11 RX ORDER — AMPICILLIN AND SULBACTAM 2; 1 G/1; G/1
3 INJECTION, POWDER, FOR SOLUTION INTRAMUSCULAR; INTRAVENOUS EVERY 6 HOURS
Status: DISCONTINUED | OUTPATIENT
Start: 2024-08-11 | End: 2024-08-12

## 2024-08-11 RX ORDER — ACETAMINOPHEN AND CODEINE PHOSPHATE 300; 30 MG/1; MG/1
1 TABLET ORAL EVERY 6 HOURS
Status: DISCONTINUED | OUTPATIENT
Start: 2024-08-11 | End: 2024-08-20 | Stop reason: HOSPADM

## 2024-08-11 RX ORDER — ENOXAPARIN SODIUM 100 MG/ML
40 INJECTION SUBCUTANEOUS EVERY 24 HOURS
Status: DISCONTINUED | OUTPATIENT
Start: 2024-08-11 | End: 2024-08-11

## 2024-08-11 RX ORDER — ACETAMINOPHEN AND CODEINE PHOSPHATE 300; 30 MG/1; MG/1
1 TABLET ORAL EVERY 6 HOURS
Status: ON HOLD | COMMUNITY
End: 2024-08-20

## 2024-08-11 RX ADMIN — ACETAMINOPHEN AND CODEINE PHOSPHATE 1 TABLET: 300; 30 TABLET ORAL at 15:39

## 2024-08-11 RX ADMIN — TOPIRAMATE 50 MG: 25 TABLET, FILM COATED ORAL at 21:01

## 2024-08-11 RX ADMIN — ENOXAPARIN SODIUM 30 MG: 30 INJECTION SUBCUTANEOUS at 20:52

## 2024-08-11 RX ADMIN — OXCARBAZEPINE 450 MG: 150 TABLET, FILM COATED ORAL at 21:08

## 2024-08-11 RX ADMIN — LEVETIRACETAM 750 MG: 500 TABLET, FILM COATED ORAL at 20:52

## 2024-08-11 RX ADMIN — MIRTAZAPINE 15 MG: 7.5 TABLET, FILM COATED ORAL at 20:52

## 2024-08-11 RX ADMIN — QUETIAPINE FUMARATE 50 MG: 25 TABLET ORAL at 21:00

## 2024-08-11 RX ADMIN — NORTRIPTYLINE HYDROCHLORIDE 50 MG: 50 CAPSULE ORAL at 21:09

## 2024-08-11 RX ADMIN — ACETAMINOPHEN AND CODEINE PHOSPHATE 1 TABLET: 300; 30 TABLET ORAL at 20:54

## 2024-08-11 RX ADMIN — SODIUM CHLORIDE 250 ML: 9 INJECTION, SOLUTION INTRAVENOUS at 11:49

## 2024-08-11 RX ADMIN — AMPICILLIN SODIUM AND SULBACTAM SODIUM 3 G: 2; 1 INJECTION, POWDER, FOR SOLUTION INTRAMUSCULAR; INTRAVENOUS at 14:56

## 2024-08-11 RX ADMIN — GUAIFENESIN 1200 MG: 600 TABLET ORAL at 20:53

## 2024-08-11 RX ADMIN — GABAPENTIN 300 MG: 300 CAPSULE ORAL at 20:52

## 2024-08-11 ASSESSMENT — ENCOUNTER SYMPTOMS
SHORTNESS OF BREATH: 0
DIARRHEA: 0
COUGH: 1
LIGHT-HEADEDNESS: 1
NAUSEA: 0
FATIGUE: 1
WEAKNESS: 1
DYSURIA: 0
CHILLS: 1
VOMITING: 0

## 2024-08-11 ASSESSMENT — ACTIVITIES OF DAILY LIVING (ADL)
ADLS_ACUITY_SCORE: 38
ADLS_ACUITY_SCORE: 44
ADLS_ACUITY_SCORE: 38
ADLS_ACUITY_SCORE: 38
ADLS_ACUITY_SCORE: 44
ADLS_ACUITY_SCORE: 44
ADLS_ACUITY_SCORE: 38
ADLS_ACUITY_SCORE: 44
ADLS_ACUITY_SCORE: 38
ADLS_ACUITY_SCORE: 36

## 2024-08-11 NOTE — ED TRIAGE NOTES
Pt comes from home with complaints of cough and headache. Patient has recurrent pneumonia d/t aspiration per daughter. Per patient, yesterday she was taking pills and aspirated. Alert and oriented. O2 88-91 on room air.      Triage Assessment (Adult)       Row Name 08/11/24 1033          Triage Assessment    Airway WDL WDL        Respiratory WDL    Respiratory WDL X;cough;rhythm/pattern     Rhythm/Pattern, Respiratory shallow     Cough Frequency frequent     Cough Type congested        Skin Circulation/Temperature WDL    Skin Circulation/Temperature WDL WDL        Cardiac WDL    Cardiac WDL WDL        Peripheral/Neurovascular WDL    Peripheral Neurovascular WDL WDL        Cognitive/Neuro/Behavioral WDL    Cognitive/Neuro/Behavioral WDL WDL

## 2024-08-11 NOTE — ED NOTES
Off O2 maintaining sats at 90-92% without activity.  Complaining of chronic headache, right ear pain.  Awaiting Tylenol 3 dose

## 2024-08-11 NOTE — H&P
[facility]    History and Physical - Hospitalist Service       Date of Admission:  8/11/2024    Assessment & Plan      Julisa Chaney is a 79 year old female with PMH of trigeminal neuralgia, PUD, MDD, YUE, HLD, collagenous colitis hospitalization in May 2024, migraine headaches admitted on 8/11/2024 with:    #Aspiration pneumonia  #Sepsis, WBCs 29.0, left shift  #Acute respiratory failure with hypoxia  CT showed diffuse mucous plugging.  Tested negative for COVID-19, influenza, RSV  UA pending.  Most prominent episode of choking with tablets on 8/10.  Suspect either silent aspiration or results of aspiration, given CT findings with mucous plugging.  - Unasyn for aspiration pneumonia  - DuoNeb, 7% saline nebs every 4 hours while awake  - RCAT  - Guaifenesin 100 mg twice daily  - Video swallow in a.m.  - Supplemental oxygen to keep SpO2 above 20%  - Monitor closely for signs of severe sepsis/septic shock    #Chronic pain syndrome due to trigeminal neuralgia.  #Trigeminal neuralgia-continue PT nortriptyline, gabapentin, Trileptal, Tylenol 3.  #History of PUD.  Continue PTA Protonix.  #MDD, insomnia-continue PTA Seroquel, amitriptyline, metoprolol.  #History of epilepsy-on Keppra.  #History of chronic diarrhea, collagenous colitis.  #Underweight,Body mass index is 16.64 kg/m .  #Failure to thrive in adult  #Moderate malnutrition  - PT, OT, SW, RD    Diet:  Regular  DVT Prophylaxis: Enoxaparin (Lovenox) SQ  Abrams Catheter: Not present  Lines: None     Cardiac Monitoring: None  Code Status:  full    Clinically Significant Risk Factors Present on Admission   # Financial/Environmental Concerns:   Disposition Plan   Medically Ready for Discharge: Anticipated in 2-4 Days  Jossy Seymour MD  Hospitalist Service  [facility]  Securely message with [employer] (more info)  Text page via [employer]    _____________________________________________________________________    Chief Complaint   Dyspnea, cough, weakness    History is obtained from the patient, electronic health record, and emergency department physician    History of Present Illness   Julisa Chaney is a 79 year old female with PMH of trigeminal neuralgia, PUD, MDD, YUE, HLD, collagenous colitis hospitalization in May 2024, migraine headaches presented to ED with family for evaluation of above-mentioned symptoms.  Reportedly patient choked on tablets yesterday.  Patient states taking them too fast.  No vomiting after episode of choking on pills.  Patient denies chest pain, fever, chills, abdominal pain, nausea, diarrhea.  In ED O2 sats 88% on room air.  CT chest negative for foreign bodies and central airways, extensive mucous plugging and airspace disease on the right.    Past Medical History    Past Medical History:   Diagnosis Date    Aspiration pneumonia (H) 02/2022    Depression     High cholesterol     Migraines     Pyloric ulcer, chronic     Sepsis (H) 08/10/2020    Trigeminal neuralgia        Past Surgical History   Past Surgical History:   Procedure Laterality Date    COLONOSCOPY N/A 1/19/2024    Procedure: COLONOSCOPY WITH RANDOM BIOPSIES;  Surgeon: Antonio Mcelroy MD, MD;  Location: [facility]    HYSTERECTOMY      IR GASTROSTOMY TUBE PERCUTANEOUS PLCMNT  8/16/2022    PICC TRIPLE LUMEN PLACEMENT  7/22/2022         [place] ESOPHAGOGASTRODUODENOSCOPY TRANSORAL DIAGNOSTIC N/A 8/13/2020    Procedure: ESOPHAGOGASTRODUODENOSCOPY (EGD);  Surgeon: Nathan Smalls MD;  Location: [facility] OR;  Service: Gastroenterology    [place] ESOPHAGOGASTRODUODENOSCOPY TRANSORAL DIAGNOSTIC N/A 8/14/2020    Procedure: ESOPHAGOGASTRODUODENOSCOPY (EGD), PYLORIC DILATION;  Surgeon: Nathan Smalls MD;  Location: [facility];  Service: Gastroenterology    VENTRICULOSTOMY Left 7/31/2022    Procedure: LEFT FRONTAL VENTRICULOSTOMY;  Surgeon: Brady Heredia  MD YESENIA;  Location: [facility] COLONOSCOPY W/WO BRUSH/WASH N/A 8/13/2020    Procedure: COLONOSCOPY WITH POLYPECTOMY;  Surgeon: Nathan Smalls MD;  Location: [facility];  Service: Gastroenterology       Prior to Admission Medications   Prior to Admission Medications   Prescriptions Last Dose Informant Patient Reported? Taking?   METAMUCIL FIBER PO   Yes No   Sig: Take 1 capsule by mouth daily   Multiple Vitamin (MULTIVITAMIN) TABS   No No   Sig: TAKE 1 TABLET BY MOUTH EVERY DAY   OXcarbazepine (TRILEPTAL) 150 MG tablet   No No   Sig: TAKE 3 TABLETS(450 MG) BY MOUTH DAILY   QUEtiapine (SEROQUEL) 50 MG tablet   No No   Sig: Take 1 tablet (50 mg) by mouth at bedtime   Vitamin D3 50 mcg (2000 units) tablet   No No   Sig: Take 1 tablet (50 mcg) by mouth daily   acetaminophen-codeine (TYLENOL #3) 300-30 MG per tablet   No No   Sig: Take 1 tablet by mouth every 4 hours as needed for severe pain   budesonide (UCERIS) 9 MG 24 hr tablet   No No   Sig: Take 1 tablet (9 mg) by mouth every morning for 30 days   ferrous sulfate (FEROSUL) 325 (65 Fe) MG tablet   No No   Sig: Take 1 tablet (325 mg) by mouth daily (with breakfast)   gabapentin (NEURONTIN) 100 MG capsule   Yes No   Sig: Take 100 mg by mouth daily   gabapentin (NEURONTIN) 100 MG capsule   Yes No   Sig: Take 300 mg by mouth at bedtime   levETIRAcetam (KEPPRA) 750 MG tablet   No No   Sig: Take 1 tablet (750 mg) by mouth 2 times daily   loperamide (IMODIUM) 2 MG capsule   Yes No   Sig: Take 2 mg by mouth 4 times daily as needed for diarrhea   menthol-zinc oxide (CALMOSEPTINE) 0.44-20.6 % OINT ointment   Yes No   Sig: Apply topically 4 times daily as needed for skin protection (for sores on buttox from sitting)   mirtazapine (REMERON) 15 MG tablet   No No   Sig: Take 1 tablet (15 mg) by mouth at bedtime   nortriptyline (PAMELOR) 50 MG capsule   No No   Sig: Take 1 capsule (50 mg) by mouth at bedtime   pantoprazole (PROTONIX) 40 MG EC tablet   No No   Sig: Take 1  tablet (40 mg) by mouth every morning (before breakfast)   potassium chloride elin ER (KLOR-CON M20) 20 MEQ CR tablet   No No   Sig: Take 1 tablet (20 mEq) by mouth daily   topiramate (TOPAMAX) 50 MG tablet   No No   Sig: Take 1 tablet (50 mg) by mouth at bedtime      Facility-Administered Medications: None        Review of Systems    The 10 point Review of Systems is negative other than noted in the HPI.    Physical Exam   Vital Signs: Temp: 98.5  F (36.9  C) Temp src: Oral BP: 112/61 Pulse: 78   Resp: 28 SpO2: 96 % O2 Device: Nasal cannula Oxygen Delivery: 2 LPM  Weight: 100 lbs 0 oz  General: Alert and oriented x 3. Not in obvious distress.  HEENT: NC, AT. Neck- supple, No JVP elevation, lymphadenopathy or thyromegaly. Trachea-central.  Chest: Coarse breath sounds, scattered rhonchi.  Heart: S1S2 regular. No M/R/G.  Abdomen: Soft. NT, ND. No organomegaly. Bowel sounds- active.  Back: No spine tenderness. No CVA tenderness.  Extremities: No leg swelling. Peripheral pulses 2+ bilaterally.  Neuro: Cranial nerves 1-12 grossly normal. No focal neurological deficit    Medical Decision Making   78 MINUTES SPENT BY ME on the date of service doing chart review, history, exam, documentation & further activities per the note.      Data     I have personally reviewed the following data over the past 24 hrs:    29.0 (H)  \   13.2   / 299     135 100 12.9 /  138 (H)   4.4 21 (L) 0.53 \     Trop: 65 (H) BNP: 317     Procal: 0.30 CRP: N/A Lactic Acid: 1.8         Imaging results reviewed over the past 24 hrs:   Recent Results (from the past 24 hour(s))   Chest CT w/o contrast    Narrative    EXAM: CT CHEST WITHOUT CONTRAST  LOCATION: [facility]  DATE: 08/11/2024    INDICATION: Choking episode with pills. Not short of breath.  Evaluate for any obvious foreign body. Evaluate if consolidation for aspiration pneumonia. Patient is allergic to contrast.  COMPARISON: None.  TECHNIQUE: CT chest without IV  contrast. Multiplanar reformats were obtained. Dose reduction techniques were used.  CONTRAST: None.    FINDINGS:   LUNGS AND PLEURA: Moderate bronchial wall thickening with fairly extensive mucous plugging on the right with postobstructive atelectasis and/or infiltrate. Minimal atelectasis and/or infiltrate in the lingula. No effusions. No definite foreign body   demonstrated in the central airways.    MEDIASTINUM/AXILLAE: A few mildly prominent lymph nodes are noted which are nonspecific and likely reactive in this setting.    CORONARY ARTERY CALCIFICATION: Severe.    UPPER ABDOMEN: No acute findings.    MUSCULOSKELETAL: No frankly destructive bony lesions.      Impression    IMPRESSION:   1.  No definite foreign body demonstrated in the central airways.  2.  Moderate bronchial wall thickening, extensive mucous plugging and airspace disease on the right.       This document is created with the help of Dragon dictation system. All grammatical errors are unintentional.

## 2024-08-11 NOTE — PROGRESS NOTES
"Attempted to see pt for nebulizer and metaneb treatment. Pt refused stating she \"couldn't possibly do anything\" right now. Appears to be resting comfortably on RA. No respiratory distress noted. Will try to revisit in 30 minutes. Notified ABRIL.    Rajwinder Esqueda, RT    "

## 2024-08-11 NOTE — ED NOTES
[facility] ED Handoff Report    ED Chief Complaint: cough and shortnessof breath    ED Diagnosis:  (J69.0) Aspiration pneumonia of right lung, unspecified aspiration pneumonia type, unspecified part of lung (H)  Comment:   Plan:     (J96.01) Acute respiratory failure with hypoxia (H)  Comment:   Plan:        PMH:    Past Medical History:   Diagnosis Date    Aspiration pneumonia (H) 02/2022    Depression     High cholesterol     Migraines     Pyloric ulcer, chronic     Sepsis (H) 08/10/2020    Trigeminal neuralgia         Code Status:  Full Code     Falls Risk: Yes Band: Applied    Current Living Situation/Residence: lives alone     Elimination Status: Continent: Yes     Activity Level: SBA    Patients Preferred Language:  English     Needed: No    Vital Signs:  /60   Pulse 84   Temp 98.5  F (36.9  C) (Oral)   Resp (!) 32   Wt 45.4 kg (100 lb)   SpO2 92%   BMI 16.64 kg/m       Cardiac Rhythm: NSR 60's    Pain Score: 7/10    Is the Patient Confused:  No    Last Food or Drink: 8/10/24 at evening    Focused Assessment:  pt is alert and oriented.  Tachypneic, with intermittent cough per pt.  Lung sounds has crackles.  O2 sats at RA was 88-89% and initially applied 2 LPM of O2 per NC and increased up to 92-95%.  Goal for O2 is 90-92% and titrate as needed.  Has chronic headache and  right Ear pain and takes Tylenol 3.  She uses wheelchair mostly to go distance with ambulation.     Tests Performed: Done: Labs and Imaging    Treatments Provided:  Unasyn; Tylenol #3 1 tab    Family Dynamics/Concerns: No    Family Updated On Visitor Policy: Yes    Plan of Care Communicated to Family: Yes    Who Was Updated about Plan of Care: daughter Ana Goodson Checklist Done and Signed by Patient: Yes    Belongings Sent with Patient: see Checklist    Medications sent with patient:     Covid: symptomatic, negative    Additional Information: still needs Urine , refused Blood draw for repeat troponin  and RT treatment for Mucus plug verbalized too overwhelmed will inform Dr. Chacko.  Pt needs applesauce with her pills    RN: Loren Ram RN 8/11/2024 3:17 PM     Cough and shortness of breath

## 2024-08-11 NOTE — PLAN OF CARE
Goal Outcome Evaluation:Pt transferred to the unit at 1530, alert and oriented, denied pain, on 2 litres oxygen via NC, O2 SATs above 90%, Pt can make her needs known.              Problem: Adult Inpatient Plan of Care  Goal: Absence of Hospital-Acquired Illness or Injury  Intervention: Identify and Manage Fall Risk  Recent Flowsheet Documentation  Taken 8/11/2024 1800 by Krystyna Leonardo RN  Safety Promotion/Fall Prevention:   activity supervised   nonskid shoes/slippers when out of bed   patient and family education   safety round/check completed   clutter free environment maintained   supervised activity

## 2024-08-11 NOTE — ED PROVIDER NOTES
EMERGENCY DEPARTMENT ENCOUNTER      NAME: Julisa Chaney  AGE: 79 year old female  YOB: 1945  MRN: [id number]  EVALUATION DATE & TIME: 8/11/2024 10:35 AM    PCP: Mara Murillo    ED PROVIDER: Dara Ledezma M.D.      CHIEF COMPLAINT     Chief Complaint   Patient presents with    Fever    Cough    Chills         FINAL IMPRESSION:     1. Aspiration pneumonia of right lung, unspecified aspiration pneumonia type, unspecified part of lung (H)    2. Acute respiratory failure with hypoxia (H)          MEDICAL DECISION MAKING:       Pertinent Labs & Imaging studies reviewed. (See chart for details)    79 year old female presents to the Emergency Department for evaluation of SOB    History  Supplemental history from daughter  External Record(s) review as documented belo    In addition to the work out documented, I considered the following work up urine analysis patient denies abdominal pain vomiting diarrhea or urinary symptoms.    ED Course as of 08/11/24 1433   Sun Aug 11, 2024   1118 79-year-old female presents here brought in by daughter.  Patient has a history of aspiration pneumonia and yesterday took her pills choke spit them up and has been weak.  Febrile and short of breath since then.   1118 No trauma.  No vomiting after the first choking episode.  No diarrhea.   1118 On examination she is quite thin pale appears cachectic satting 88 to 99% on room air crackles and rhonchi throughout.  No lower extremity edema.   1118 Concerned about PE congestive heart failure pneumonia acute coronary syndrome anemia among others.   1118 I spoke with the daughter.  Patient lives independently.  Daughter lives close by.   1119 Will establish an IV.  Will place patient on pulse tachycardia blood pressure monitors.  Will need supplemental oxygen.  Will CT chest.  Anticipate admission.  Will also check for any infectious pathology.   1346 CT chest read by me reveals no free air.  Scattered consolidations throughout the  right side of the lung.   1429 Formal read by radiologist reveals mucous plugging on the right.   1429 Patient tolerating oxygen well.  I updated the patient's daughter.  Will anticipate admission.   1430 Spoke with hospitalist who kindly accepted patient [facility] inpatient.   1430 Patient admitted in stable condition with guarded diagnosis.   1430 Labs elevated white blood cell count with no left shift.  Gently hydrated.  COVID influenza are negative.  Normal renal function.  No evidence of congestive heart failure.  Procalcitonin is within normal limits.   1430 VBG shows normal pH with slight pCO2 elevation.  Will titrate oxygen.  Normal lactic acid.   1430 Evaded troponin at 65.  EKG with that ischemic changes.  Will trend.         Medical Decision Making    History:  Supplemental history from: Documented in chart  External Record(s) reviewed: Documented in chart    Work Up:  Chart documentation includes differential considered and any EKGs or imaging independently interpreted by provider, where specified.  In additional to work up documented, I considered the following work up: Documented in chart, if applicable.    External consultation:  Discussion of management with another provider: Hospitalist and Pharmacist    Complicating factors:  Care impacted by chronic illness: Cachexia  Care affected by social determinants of health: N/A    Disposition considerations: Admit.      Review of External Records  Hospital/Clinic: [facility]  Date:6/17/2024  Seizure disorder chronic pain trigeminal neuralgia GERD    External Consultation  Hospitalist -- Dr. Seymour  Pharmacy      ED COURSE   11:02 AM I met with the patient and performed my initial physical exam.  I discussed the plan going forward with the patient including imaging, labs, and possible admission.  11:20 AM I called the patient's daughter and spoke with her about patient's history.  Discussed the plan going forward.  2:22 PM I spoke with Dr. Seymour,  "hospitalist, about the patient and the plan for admission.   2:33 PM reevaluated and updated.      At the conclusion of the encounter I discussed the results of all of the tests and the disposition. The questions were answered. The patient and daughter acknowledged understanding and was agreeable with the care plan.       MEDICATIONS GIVEN IN THE EMERGENCY:     Medications   ampicillin-sulbactam (UNASYN) 3 g vial to attach to  mL bag (has no administration in time range)   sodium chloride 0.9% BOLUS 250 mL (250 mLs Intravenous $New Bag 8/11/24 0453)       NEW PRESCRIPTIONS STARTED AT TODAY'S ER VISIT     New Prescriptions    No medications on file          =================================================================    HPI     Patient information was obtained from: Patient    Use of : N/A        Julisaantonette Chaney is a 79 year old female who presents by private car with  (daughter) for evaluation of a fever, cough, chills.    Yesterday, patient shares that she was attempting to take her pills when she started to choke.  She notes that due to this she had to spit the pills out.  Following this episode, patient endorses having a cough.  Additionally, last night, patient states starting to feel unwell.  She states having some diffuse shakiness, fatigue, weakness, and lightheadedness.  Overall, patient shares that she feels as if she is \"coming down with pneumonia,\" as she feels \"out of it.\"    Patient has a history of pneumonia in the past.  She also has a history of being diagnosed with fibromyalgia.    Patient shares that she lives independently in a private home.  She states her daughter lives blocks away.  She states use of a wheelchair.    Patient denies taking her medications this morning.  Denies any nausea, vomiting, diarrhea, dysuria, leg swelling, rash, chest pain, shortness of breath, or any known sick contacts.  Denies any recent falls or episodes of hitting her head on anything.  " In the room, patient was breathing 89% on room air.    Otherwise in normal state of health. No further concerns at this time.     REVIEW OF SYSTEMS   Review of Systems   Constitutional:  Positive for chills and fatigue.        Positive for being shaky.   Respiratory:  Positive for cough. Negative for shortness of breath.    Cardiovascular:  Negative for chest pain and leg swelling.   Gastrointestinal:  Negative for diarrhea, nausea and vomiting.   Genitourinary:  Negative for dysuria.   Skin:  Negative for rash.   Neurological:  Positive for weakness and light-headedness.   All other systems reviewed and are negative.       PAST MEDICAL HISTORY:     Past Medical History:   Diagnosis Date    Aspiration pneumonia (H) 02/2022    Depression     High cholesterol     Migraines     Pyloric ulcer, chronic     Sepsis (H) 08/10/2020    Trigeminal neuralgia        PAST SURGICAL HISTORY:     Past Surgical History:   Procedure Laterality Date    COLONOSCOPY N/A 1/19/2024    Procedure: COLONOSCOPY WITH RANDOM BIOPSIES;  Surgeon: Antonio Mcelroy MD, MD;  Location: [facility] [place]    HYSTERECTOMY      IR GASTROSTOMY TUBE PERCUTANEOUS PLCMNT  8/16/2022    PICC TRIPLE LUMEN PLACEMENT  7/22/2022         CA ESOPHAGOGASTRODUODENOSCOPY TRANSORAL DIAGNOSTIC N/A 8/13/2020    Procedure: ESOPHAGOGASTRODUODENOSCOPY (EGD);  Surgeon: Nathan Smalls MD;  [place] [facility]  Service: Gastroenterology    CA ESOPHAGOGASTRODUODENOSCOPY TRANSORAL DIAGNOSTIC N/A 8/14/2020    Procedure: ESOPHAGOGASTRODUODENOSCOPY (EGD), PYLORIC DILATION;  Surgeon: Nathan Smalls MD;  [place] [facility] [place]  Service: Gastroenterology    VENTRICULOSTOMY Left 7/31/2022    Procedure: LEFT FRONTAL VENTRICULOSTOMY;  Surgeon: Brady Heredia MD;  Location: [facility] COLONOSCOPY W/WO BRUSH/WASH N/A 8/13/2020    Procedure: COLONOSCOPY WITH POLYPECTOMY;  Surgeon: Nathan Smalls MD;  [place] [facility]  Service: Gastroenterology          CURRENT MEDICATIONS:   acetaminophen-codeine (TYLENOL #3) 300-30 MG per tablet  budesonide (UCERIS) 9 MG 24 hr tablet  ferrous sulfate (FEROSUL) 325 (65 Fe) MG tablet  gabapentin (NEURONTIN) 100 MG capsule  gabapentin (NEURONTIN) 100 MG capsule  levETIRAcetam (KEPPRA) 750 MG tablet  loperamide (IMODIUM) 2 MG capsule  menthol-zinc oxide (CALMOSEPTINE) 0.44-20.6 % OINT ointment  METAMUCIL FIBER PO  mirtazapine (REMERON) 15 MG tablet  Multiple Vitamin (MULTIVITAMIN) TABS  nortriptyline (PAMELOR) 50 MG capsule  OXcarbazepine (TRILEPTAL) 150 MG tablet  pantoprazole (PROTONIX) 40 MG EC tablet  potassium chloride elin ER (KLOR-CON M20) 20 MEQ CR tablet  QUEtiapine (SEROQUEL) 50 MG tablet  topiramate (TOPAMAX) 50 MG tablet  Vitamin D3 50 mcg (2000 units) tablet         ALLERGIES:     Allergies   Allergen Reactions    Contrast [Iohexol] Rash     Noticed during 2020 admission. Received IV contrast on     Chocolate Headache    Contrast Dye Rash    Penicillins Rash     Tolerated 8 week course of Augmentin in        FAMILY HISTORY:     Family History   Problem Relation Age of Onset    Cancer Father     Testicular cancer Grandchild 17.00    Breast Cancer Mother     Breast Cancer Sister     Breast Cancer Maternal Aunt     Breast Cancer Sister        SOCIAL HISTORY:     Social History     Socioeconomic History    Marital status:    Tobacco Use    Smoking status: Former     Current packs/day: 0.00     Average packs/day: 1 pack/day for 50.0 years (50.0 ttl pk-yrs)     Types: Cigarettes     Start date: 1964     Quit date: 2014     Years since quittin.7    Smokeless tobacco: Never   Substance and Sexual Activity    Alcohol use: No    Drug use: No   Social History Narrative    Lives alone in the house, relay in TV dinner  grandkids help with house maintenance  Daughter lives close by.  Mara Murillo MD 2018 1:49 PM       Social Determinants of Health     Financial Resource Strain: Low Risk   (4/9/2024)    Received from [facility]    Financial Resource Strain     Difficulty of Paying Living Expenses: 3   Food Insecurity: No Food Insecurity (4/9/2024)    Received from [facility]    Food Insecurity     Worried About Running Out of Food in the Last Year: 1   Transportation Needs: Unmet Transportation Needs (4/9/2024)    Received from [facility]    Transportation Needs     Lack of Transportation (Medical): 2   Social Connections: Socially Integrated (4/9/2024)    Received from [facility]    Social Connections     Frequency of Communication with Friends and Family: 0   Housing Stability: Low Risk  (4/9/2024)    Received from [facility]    Housing Stability     Unable to Pay for Housing in the Last Year: 1       VITALS:   /61   Pulse 78   Temp 98.5  F (36.9  C) (Oral)   Resp 28   Wt 45.4 kg (100 lb)   SpO2 96%   BMI 16.64 kg/m      PHYSICAL EXAM     Physical Exam  Vitals and nursing note reviewed. Exam conducted with a chaperone present.   Constitutional:       Appearance: She is ill-appearing.   Skin:     Capillary Refill: Capillary refill takes less than 2 seconds.      Coloration: Skin is pale.         Physical Exam   Constitutional: Chronically ill appearing, nontoxic.  Frail.    Head: Atraumatic.     Nose: Nose normal.     Mouth/Throat: Oropharynx is clear and moist.  Multiple missing teeth.    Eyes: EOM are normal. Pupils are equal, round, and reactive to light.  Discharge of left eye.    Ears: Bilateral pearly white tympanic membranes.    Neck: Normal range of motion. Neck supple.     Cardiovascular: Normal rate, regular rhythm and normal heart sounds.  2+ femoral pulses/radial/DP pulses B    Pulmonary/Chest: Normal effort, crackles throughout all lung fields.    Abdominal: soft nontender.    Musculoskeletal: Normal range of  motion.     Neurological: Moves upper and lower extremities equally.    Lymphatics: no edema, no calves pain, no palpable cords.    : NA    Skin: Skin is warm and dry.  Pale appearing.    Psychiatric: Normal mood and affect. Behavior is normal.       LAB:     All pertinent labs reviewed and interpreted.  Labs Ordered and Resulted from Time of ED Arrival to Time of ED Departure   BASIC METABOLIC PANEL - Abnormal       Result Value    Sodium 135      Potassium 4.4      Chloride 100      Carbon Dioxide (CO2) 21 (*)     Anion Gap 14      Urea Nitrogen 12.9      Creatinine 0.53      GFR Estimate >90      Calcium 8.7 (*)     Glucose 138 (*)    BLOOD GAS VENOUS - Abnormal    pH Venous 7.33      pCO2 Venous 56 (*)     pO2 Venous 25      Bicarbonate Venous 29 (*)     Base Excess/Deficit Venous 3.4 (*)     FIO2 28      Oxyhemoglobin Venous 39 (*)     O2 Sat, Venous 39.6 (*)    TROPONIN T, HIGH SENSITIVITY - Abnormal    Troponin T, High Sensitivity 65 (*)    CBC WITH PLATELETS AND DIFFERENTIAL - Abnormal    WBC Count 29.0 (*)     RBC Count 5.00      Hemoglobin 13.2      Hematocrit 42.6      MCV 85      MCH 26.4 (*)     MCHC 31.0 (*)     RDW 15.0      Platelet Count 299      % Neutrophils 91      % Lymphocytes 3      % Monocytes 5      % Eosinophils 0      % Basophils 0      % Immature Granulocytes 1      NRBCs per 100 WBC 0      Absolute Neutrophils 26.5 (*)     Absolute Lymphocytes 0.9      Absolute Monocytes 1.4 (*)     Absolute Eosinophils 0.0      Absolute Basophils 0.1      Absolute Immature Granulocytes 0.2      Absolute NRBCs 0.0     NT PROBNP INPATIENT - Normal    N terminal Pro BNP Inpatient 317     INFLUENZA A/B, RSV, & SARS-COV2 PCR - Normal    Influenza A PCR Negative      Influenza B PCR Negative      RSV PCR Negative      SARS CoV2 PCR Negative     PROCALCITONIN - Normal    Procalcitonin 0.30     LACTIC ACID WHOLE BLOOD - Normal    Lactic Acid 1.8     TROPONIN T, HIGH SENSITIVITY        RADIOLOGY:     Reviewed  all pertinent imaging. Please see official radiology report.  Chest CT w/o contrast   Final Result   IMPRESSION:    1.  No definite foreign body demonstrated in the central airways.   2.  Moderate bronchial wall thickening, extensive mucous plugging and airspace disease on the right.                 EKG:     EKG #1  Sinus rhythm.  Poor anterior R wave progression.  Normal axis.    Time: 850313    Ventricular rate 88 bmp  Axis normal  NY interval 150 ms  QRS duration 68 ms  QT//418 ms    Compared to previous EKG on May 28, 2024 heart rate was 110.  Poor anterior progression seen before but R wave V2 currently quite tall.  I have independently reviewed and interpreted the EKG(s) documented above.      PROCEDURES:     Procedures      I, Enma Sapp, am serving as a scribe to document services personally performed by Dr. Ledezma based on my observation and the provider's statements to me. I, Dara Ledezma MD attest that Enma Sapp is acting in a scribe capacity, has observed my performance of the services and has documented them in accordance with my direction.    Dara Ledezma M.D.  Emergency Medicine  [facility] EMERGENCY DEPARTMENT  [address] [place] [id number] [phone]  Dept: [phone]       Dara Ledezma MD  08/11/24 7639

## 2024-08-12 ENCOUNTER — APPOINTMENT (OUTPATIENT)
Dept: PHYSICAL THERAPY | Facility: HOSPITAL | Age: 79
DRG: 871 | End: 2024-08-12
Attending: INTERNAL MEDICINE
Payer: COMMERCIAL

## 2024-08-12 ENCOUNTER — APPOINTMENT (OUTPATIENT)
Dept: OCCUPATIONAL THERAPY | Facility: HOSPITAL | Age: 79
DRG: 871 | End: 2024-08-12
Attending: INTERNAL MEDICINE
Payer: COMMERCIAL

## 2024-08-12 ENCOUNTER — APPOINTMENT (OUTPATIENT)
Dept: RADIOLOGY | Facility: HOSPITAL | Age: 79
DRG: 871 | End: 2024-08-12
Attending: INTERNAL MEDICINE
Payer: COMMERCIAL

## 2024-08-12 ENCOUNTER — APPOINTMENT (OUTPATIENT)
Dept: SPEECH THERAPY | Facility: HOSPITAL | Age: 79
DRG: 871 | End: 2024-08-12
Payer: COMMERCIAL

## 2024-08-12 LAB
ALBUMIN SERPL BCG-MCNC: 3.2 G/DL (ref 3.5–5.2)
ALBUMIN UR-MCNC: 20 MG/DL
ALP SERPL-CCNC: 119 U/L (ref 40–150)
ALT SERPL W P-5'-P-CCNC: 22 U/L (ref 0–50)
ANION GAP SERPL CALCULATED.3IONS-SCNC: 8 MMOL/L (ref 7–15)
APPEARANCE UR: CLEAR
AST SERPL W P-5'-P-CCNC: 15 U/L (ref 0–45)
BASOPHILS # BLD AUTO: 0.1 10E3/UL (ref 0–0.2)
BASOPHILS NFR BLD AUTO: 0 %
BILIRUB SERPL-MCNC: 0.3 MG/DL
BILIRUB UR QL STRIP: NEGATIVE
BUN SERPL-MCNC: 12 MG/DL (ref 8–23)
CALCIUM SERPL-MCNC: 8.6 MG/DL (ref 8.8–10.4)
CHLORIDE SERPL-SCNC: 102 MMOL/L (ref 98–107)
COLOR UR AUTO: YELLOW
CREAT SERPL-MCNC: 0.62 MG/DL (ref 0.51–0.95)
EGFRCR SERPLBLD CKD-EPI 2021: 90 ML/MIN/1.73M2
EOSINOPHIL # BLD AUTO: 0 10E3/UL (ref 0–0.7)
EOSINOPHIL NFR BLD AUTO: 0 %
ERYTHROCYTE [DISTWIDTH] IN BLOOD BY AUTOMATED COUNT: 15 % (ref 10–15)
GLUCOSE SERPL-MCNC: 112 MG/DL (ref 70–99)
GLUCOSE UR STRIP-MCNC: 30 MG/DL
HCO3 SERPL-SCNC: 28 MMOL/L (ref 22–29)
HCT VFR BLD AUTO: 37.2 % (ref 35–47)
HGB BLD-MCNC: 11.5 G/DL (ref 11.7–15.7)
HGB UR QL STRIP: NEGATIVE
IMM GRANULOCYTES # BLD: 0.2 10E3/UL
IMM GRANULOCYTES NFR BLD: 1 %
KETONES UR STRIP-MCNC: NEGATIVE MG/DL
LACTATE SERPL-SCNC: 0.6 MMOL/L (ref 0.7–2)
LEUKOCYTE ESTERASE UR QL STRIP: NEGATIVE
LYMPHOCYTES # BLD AUTO: 1.5 10E3/UL (ref 0.8–5.3)
LYMPHOCYTES NFR BLD AUTO: 7 %
MAGNESIUM SERPL-MCNC: 1.9 MG/DL (ref 1.7–2.3)
MCH RBC QN AUTO: 26.3 PG (ref 26.5–33)
MCHC RBC AUTO-ENTMCNC: 30.9 G/DL (ref 31.5–36.5)
MCV RBC AUTO: 85 FL (ref 78–100)
MONOCYTES # BLD AUTO: 1.2 10E3/UL (ref 0–1.3)
MONOCYTES NFR BLD AUTO: 5 %
MRSA DNA SPEC QL NAA+PROBE: POSITIVE
NEUTROPHILS # BLD AUTO: 19.7 10E3/UL (ref 1.6–8.3)
NEUTROPHILS NFR BLD AUTO: 87 %
NITRATE UR QL: NEGATIVE
NRBC # BLD AUTO: 0 10E3/UL
NRBC BLD AUTO-RTO: 0 /100
PH UR STRIP: 6 [PH] (ref 5–7)
PLATELET # BLD AUTO: 274 10E3/UL (ref 150–450)
POTASSIUM SERPL-SCNC: 3.6 MMOL/L (ref 3.4–5.3)
PROT SERPL-MCNC: 6.4 G/DL (ref 6.4–8.3)
RBC # BLD AUTO: 4.37 10E6/UL (ref 3.8–5.2)
RBC URINE: 1 /HPF
SA TARGET DNA: POSITIVE
SODIUM SERPL-SCNC: 138 MMOL/L (ref 135–145)
SP GR UR STRIP: 1.03 (ref 1–1.03)
TROPONIN T SERPL HS-MCNC: 73 NG/L
UROBILINOGEN UR STRIP-MCNC: <2 MG/DL
WBC # BLD AUTO: 22.6 10E3/UL (ref 4–11)
WBC URINE: 2 /HPF

## 2024-08-12 PROCEDURE — 94799 UNLISTED PULMONARY SVC/PX: CPT

## 2024-08-12 PROCEDURE — 74230 X-RAY XM SWLNG FUNCJ C+: CPT

## 2024-08-12 PROCEDURE — 92611 MOTION FLUOROSCOPY/SWALLOW: CPT | Mod: GN

## 2024-08-12 PROCEDURE — 87186 SC STD MICRODIL/AGAR DIL: CPT | Performed by: INTERNAL MEDICINE

## 2024-08-12 PROCEDURE — 250N000013 HC RX MED GY IP 250 OP 250 PS 637: Performed by: INTERNAL MEDICINE

## 2024-08-12 PROCEDURE — 85025 COMPLETE CBC W/AUTO DIFF WBC: CPT | Performed by: INTERNAL MEDICINE

## 2024-08-12 PROCEDURE — 92610 EVALUATE SWALLOWING FUNCTION: CPT | Mod: GN

## 2024-08-12 PROCEDURE — 272N000202 HC AEROBIKA WITH MANOMETER

## 2024-08-12 PROCEDURE — 97530 THERAPEUTIC ACTIVITIES: CPT | Mod: GP

## 2024-08-12 PROCEDURE — 36415 COLL VENOUS BLD VENIPUNCTURE: CPT | Performed by: INTERNAL MEDICINE

## 2024-08-12 PROCEDURE — 80053 COMPREHEN METABOLIC PANEL: CPT | Performed by: INTERNAL MEDICINE

## 2024-08-12 PROCEDURE — 84484 ASSAY OF TROPONIN QUANT: CPT | Performed by: INTERNAL MEDICINE

## 2024-08-12 PROCEDURE — 94640 AIRWAY INHALATION TREATMENT: CPT | Mod: 76

## 2024-08-12 PROCEDURE — 250N000011 HC RX IP 250 OP 636: Performed by: INTERNAL MEDICINE

## 2024-08-12 PROCEDURE — 99233 SBSQ HOSP IP/OBS HIGH 50: CPT | Performed by: INTERNAL MEDICINE

## 2024-08-12 PROCEDURE — 81001 URINALYSIS AUTO W/SCOPE: CPT | Performed by: INTERNAL MEDICINE

## 2024-08-12 PROCEDURE — 999N000157 HC STATISTIC RCP TIME EA 10 MIN

## 2024-08-12 PROCEDURE — 94640 AIRWAY INHALATION TREATMENT: CPT

## 2024-08-12 PROCEDURE — 83735 ASSAY OF MAGNESIUM: CPT | Performed by: INTERNAL MEDICINE

## 2024-08-12 PROCEDURE — 97542 WHEELCHAIR MNGMENT TRAINING: CPT | Mod: GP

## 2024-08-12 PROCEDURE — 120N000001 HC R&B MED SURG/OB

## 2024-08-12 PROCEDURE — 83605 ASSAY OF LACTIC ACID: CPT | Performed by: INTERNAL MEDICINE

## 2024-08-12 PROCEDURE — 87641 MR-STAPH DNA AMP PROBE: CPT | Performed by: INTERNAL MEDICINE

## 2024-08-12 PROCEDURE — 272N000735 HC KIT, CIRCUIT WITH NEB, VOLERA

## 2024-08-12 PROCEDURE — 97161 PT EVAL LOW COMPLEX 20 MIN: CPT | Mod: GP

## 2024-08-12 PROCEDURE — 97535 SELF CARE MNGMENT TRAINING: CPT | Mod: GO

## 2024-08-12 PROCEDURE — 97165 OT EVAL LOW COMPLEX 30 MIN: CPT | Mod: GO

## 2024-08-12 PROCEDURE — 999N000156 HC STATISTIC RCP CONSULT EA 30 MIN

## 2024-08-12 PROCEDURE — 250N000009 HC RX 250: Performed by: INTERNAL MEDICINE

## 2024-08-12 PROCEDURE — 258N000003 HC RX IP 258 OP 636: Performed by: INTERNAL MEDICINE

## 2024-08-12 PROCEDURE — 92526 ORAL FUNCTION THERAPY: CPT | Mod: GN

## 2024-08-12 PROCEDURE — 94660 CPAP INITIATION&MGMT: CPT

## 2024-08-12 RX ORDER — VANCOMYCIN HYDROCHLORIDE 1 G/200ML
1000 INJECTION, SOLUTION INTRAVENOUS EVERY 24 HOURS
Status: DISCONTINUED | OUTPATIENT
Start: 2024-08-13 | End: 2024-08-15

## 2024-08-12 RX ORDER — BARIUM SULFATE 400 MG/ML
SUSPENSION ORAL ONCE
Status: COMPLETED | OUTPATIENT
Start: 2024-08-12 | End: 2024-08-12

## 2024-08-12 RX ORDER — CEFAZOLIN SODIUM 1 G/50ML
1250 SOLUTION INTRAVENOUS EVERY 24 HOURS
Status: COMPLETED | OUTPATIENT
Start: 2024-08-12 | End: 2024-08-12

## 2024-08-12 RX ORDER — METRONIDAZOLE 500 MG/100ML
500 INJECTION, SOLUTION INTRAVENOUS EVERY 12 HOURS
Status: DISCONTINUED | OUTPATIENT
Start: 2024-08-12 | End: 2024-08-15

## 2024-08-12 RX ORDER — CEFEPIME HYDROCHLORIDE 2 G/1
2 INJECTION, POWDER, FOR SOLUTION INTRAVENOUS EVERY 8 HOURS
Status: DISCONTINUED | OUTPATIENT
Start: 2024-08-12 | End: 2024-08-15

## 2024-08-12 RX ADMIN — GABAPENTIN 300 MG: 300 CAPSULE ORAL at 22:17

## 2024-08-12 RX ADMIN — TOPIRAMATE 50 MG: 25 TABLET, FILM COATED ORAL at 22:17

## 2024-08-12 RX ADMIN — AMPICILLIN SODIUM AND SULBACTAM SODIUM 3 G: 2; 1 INJECTION, POWDER, FOR SOLUTION INTRAMUSCULAR; INTRAVENOUS at 05:34

## 2024-08-12 RX ADMIN — POTASSIUM CHLORIDE 20 MEQ: 1500 TABLET, EXTENDED RELEASE ORAL at 09:19

## 2024-08-12 RX ADMIN — GABAPENTIN 100 MG: 100 CAPSULE ORAL at 09:18

## 2024-08-12 RX ADMIN — ACETAMINOPHEN AND CODEINE PHOSPHATE 1 TABLET: 300; 30 TABLET ORAL at 09:17

## 2024-08-12 RX ADMIN — ENOXAPARIN SODIUM 30 MG: 30 INJECTION SUBCUTANEOUS at 19:37

## 2024-08-12 RX ADMIN — BARIUM SULFATE 60 ML: 400 SUSPENSION ORAL at 13:55

## 2024-08-12 RX ADMIN — CEFEPIME HYDROCHLORIDE 2 G: 2 INJECTION, POWDER, FOR SOLUTION INTRAVENOUS at 10:45

## 2024-08-12 RX ADMIN — QUETIAPINE FUMARATE 50 MG: 25 TABLET ORAL at 22:17

## 2024-08-12 RX ADMIN — OXCARBAZEPINE 450 MG: 150 TABLET, FILM COATED ORAL at 22:17

## 2024-08-12 RX ADMIN — IPRATROPIUM BROMIDE AND ALBUTEROL SULFATE 3 ML: .5; 3 SOLUTION RESPIRATORY (INHALATION) at 19:58

## 2024-08-12 RX ADMIN — NORTRIPTYLINE HYDROCHLORIDE 50 MG: 50 CAPSULE ORAL at 22:17

## 2024-08-12 RX ADMIN — CEFEPIME HYDROCHLORIDE 2 G: 2 INJECTION, POWDER, FOR SOLUTION INTRAVENOUS at 19:44

## 2024-08-12 RX ADMIN — SODIUM CHLORIDE 1250 MG: 9 INJECTION, SOLUTION INTRAVENOUS at 12:32

## 2024-08-12 RX ADMIN — IPRATROPIUM BROMIDE AND ALBUTEROL SULFATE 3 ML: .5; 3 SOLUTION RESPIRATORY (INHALATION) at 08:03

## 2024-08-12 RX ADMIN — Medication 50 MCG: at 09:17

## 2024-08-12 RX ADMIN — FERROUS SULFATE TAB 325 MG (65 MG ELEMENTAL FE) 325 MG: 325 (65 FE) TAB at 09:18

## 2024-08-12 RX ADMIN — GUAIFENESIN 1200 MG: 600 TABLET ORAL at 09:19

## 2024-08-12 RX ADMIN — Medication 4 ML: at 08:10

## 2024-08-12 RX ADMIN — Medication 4 ML: at 20:00

## 2024-08-12 RX ADMIN — LEVETIRACETAM 750 MG: 500 TABLET, FILM COATED ORAL at 09:18

## 2024-08-12 RX ADMIN — ACETAMINOPHEN AND CODEINE PHOSPHATE 1 TABLET: 300; 30 TABLET ORAL at 22:17

## 2024-08-12 RX ADMIN — Medication 4 ML: at 12:43

## 2024-08-12 RX ADMIN — IPRATROPIUM BROMIDE AND ALBUTEROL SULFATE 3 ML: .5; 3 SOLUTION RESPIRATORY (INHALATION) at 12:42

## 2024-08-12 RX ADMIN — METRONIDAZOLE 500 MG: 5 INJECTION, SOLUTION INTRAVENOUS at 11:34

## 2024-08-12 RX ADMIN — PANTOPRAZOLE SODIUM 40 MG: 20 TABLET, DELAYED RELEASE ORAL at 09:17

## 2024-08-12 RX ADMIN — ACETAMINOPHEN AND CODEINE PHOSPHATE 1 TABLET: 300; 30 TABLET ORAL at 15:12

## 2024-08-12 RX ADMIN — GUAIFENESIN 1200 MG: 600 TABLET ORAL at 19:37

## 2024-08-12 RX ADMIN — PSYLLIUM HUSK 1 PACKET: 3.4 POWDER ORAL at 09:20

## 2024-08-12 RX ADMIN — LEVETIRACETAM 750 MG: 500 TABLET, FILM COATED ORAL at 19:37

## 2024-08-12 RX ADMIN — ACETAMINOPHEN AND CODEINE PHOSPHATE 1 TABLET: 300; 30 TABLET ORAL at 05:34

## 2024-08-12 RX ADMIN — MIRTAZAPINE 15 MG: 7.5 TABLET, FILM COATED ORAL at 22:17

## 2024-08-12 RX ADMIN — AMPICILLIN SODIUM AND SULBACTAM SODIUM 3 G: 2; 1 INJECTION, POWDER, FOR SOLUTION INTRAMUSCULAR; INTRAVENOUS at 00:05

## 2024-08-12 ASSESSMENT — ACTIVITIES OF DAILY LIVING (ADL)
ADLS_ACUITY_SCORE: 42
ADLS_ACUITY_SCORE: 44
ADLS_ACUITY_SCORE: 42
ADLS_ACUITY_SCORE: 44
ADLS_ACUITY_SCORE: 46
ADLS_ACUITY_SCORE: 42
ADLS_ACUITY_SCORE: 42
ADLS_ACUITY_SCORE: 44
ADLS_ACUITY_SCORE: 42
ADLS_ACUITY_SCORE: 44
ADLS_ACUITY_SCORE: 42
ADLS_ACUITY_SCORE: 46
ADLS_ACUITY_SCORE: 44
DEPENDENT_IADLS:: CLEANING;COOKING;LAUNDRY;SHOPPING;MEAL PREPARATION;MEDICATION MANAGEMENT;MONEY MANAGEMENT;TRANSPORTATION

## 2024-08-12 NOTE — PLAN OF CARE
Problem: Adult Inpatient Plan of Care  Goal: Optimal Comfort and Wellbeing  8/12/2024 1418 by Sarina Smith RN  Outcome: Progressing  8/12/2024 1417 by Sarina Smith RN  Outcome: Progressing  Intervention: Monitor Pain and Promote Comfort  Recent Flowsheet Documentation  Taken 8/12/2024 1015 by Sarina Smith RN  Pain Management Interventions: declines  Taken 8/12/2024 0918 by Sarina Smith RN  Pain Management Interventions: medication (see MAR)     Problem: Pneumonia  Goal: Resolution of Infection Signs and Symptoms  Outcome: Progressing  Goal: Effective Oxygenation and Ventilation  Outcome: Progressing  Intervention: Promote Airway Secretion Clearance  Recent Flowsheet Documentation  Taken 8/12/2024 1230 by Sarina Smith RN  Cough And Deep Breathing: done with encouragement  Taken 8/12/2024 0855 by Sarina Smith RN  Cough And Deep Breathing: done with encouragement  Intervention: Optimize Oxygenation and Ventilation  Recent Flowsheet Documentation  Taken 8/12/2024 1230 by Sarina Smith RN  Head of Bed (HOB) Positioning: HOB at 30-45 degrees  Taken 8/12/2024 0855 by Sarina Smith RN  Head of Bed (HOB) Positioning: HOB at 30-45 degrees     Problem: Swallowing Impairment  Goal: Optimal Eating and Swallowing Without Aspiration  Outcome: Progressing   Goal Outcome Evaluation:    Patient having her usual pain in her face from trigeminal neuralgia .Patient receiving nebs. Lungs sounds very coarse crackles. On numerous antibiotics. RA. Pt went down for swallow study. Awaiting results for any diet modifications.   VSS.

## 2024-08-12 NOTE — CONSULTS
"CLINICAL NUTRITION SERVICES - ASSESSMENT NOTE     Nutrition Prescription    RECOMMENDATIONS FOR MDs/PROVIDERS TO ORDER:    Malnutrition Status:    Moderate in chronic illness    Recommendations already ordered by Registered Dietitian (RD):  Jansen ensure clear three times per day with meals when diet advances ( if   Thickened liquids are not needed)    Future/Additional Recommendations:  Monitor for diet adv, po intake, weight, labs, GOC     REASON FOR ASSESSMENT  Julisa Chaney is a/an 79 year old female assessed by the dietitian for Provider Order - Underweight, protein malnutrition    Pt with a past medical history of trigeminal nauralgia, PUD, MDDm YUE, HLD, collagenous colitis, hospitalized in May 2024, migraine headaches admitted on 8/11/2024 weight aspiration pneumonia ( had choking with tablets on 8/10)- swallow evaluation today.  She has chronic pain syndrome, hx of epilepsy, underweight, FTT in adult and moderate malnutrition    NUTRITION HISTORY  Pt states she was eating OK PTA.  She took ensure clear as much as she could as she really likes it. She got ensure clear on previous admit here in May and also got gelatein plus which she does not want.  She would like berry ensure clear as often as we can send. She does also like pudding and cheese cake.She states her daughter did the cooking for her at home.    CURRENT NUTRITION ORDERS  Diet: NPO    LABS  Labs reviewed: Na 138, K 3.6, urea nitrogen 12, Cr 0.62, Ca 8.6 (L), Mg 1.9, Glu 112    MEDICATIONS  Medications reviewed: maxipime, lovenox, ferrous sulfate, neurontin, keppra, flagyl, remeron, pamelor, trileptal, pantoprazole, potassium chloride, metamucil, seroquel, topamax, vancocin, Vit D 3    ANTHROPOMETRICS  Height: 167.6 cm (5' 6\")  Most Recent Weight: 45.4 kg (100 lb)    IBW: 59 kg (130 lb)  BMI: Underweight BMI <18.5  Weight History:   Wt Readings from Last 10 Encounters:   08/11/24 45.4 kg (100 lb)   06/10/24 45.4 kg (100 lb)   06/05/24 45.4 kg " (100 lb)   06/03/24 45.3 kg (99 lb 14.4 oz)   05/03/24 41.2 kg (90 lb 12.8 oz)   04/24/24 41.2 kg (90 lb 12.8 oz)   04/19/24 44 kg (97 lb)   04/15/24 44 kg (97 lb)   04/05/24 41.9 kg (92 lb 6 oz)   03/04/24 45.4 kg (100 lb)       Dosing Weight: 45.4 kg    ASSESSED NUTRITION NEEDS  Estimated Energy Needs: 1589+ kcals/day (35+ Raymond/Kg)  Justification: Repletion and Underweight  Estimated Protein Needs: 54+ grams protein/day (1.2+)  Justification: Repletion  Estimated Fluid Needs: 4510-1689 mL/day (30 - 35 mL/kg)   Justification: Maintenance    PHYSICAL FINDINGS  See malnutrition section below.  Skin: WDL  Alli score=19, nutrition noted as adequate  GI: WDL  Last BM: 8/11 ( loose, brown)    MALNUTRITION:  % Weight Loss:  None noted  % Intake:  No decreased intake noted  Subcutaneous Fat Loss:  Orbital region moderate depletion, Upper arm region moderate depletion, and Thoracic region moderate depletion  Muscle Loss:  Temporal region moderate depletion, Clavicle bone region severe depletion, Patellar region moderate depletion, Anterior thigh region moderate depletion, and Posterior calf region moderate depletion  Fluid Retention:  None noted    Malnutrition Diagnosis: Moderate malnutrition  In Context of:  Chronic illness or disease    NUTRITION DIAGNOSIS  Malnutrition related to chronic illness as evidenced by moderate fat loss and moderate to severe muscle loss      INTERVENTIONS  Implementation  Follow for diet texture and liquid consistency recommendations from speech language pathologist after swallow study   If pt does not require thickened liquids will add ensure clear ( berry) when diet advances ( can not thicken the ensure clear)    Goals  Diet advancement vs nutrition support within 2-3 days.  Patient to consume % of nutritionally adequate meals three times per day, or the equivalent with supplements/snacks.     Monitoring/Evaluation  Progress toward goals will be monitored and evaluated per protocol.

## 2024-08-12 NOTE — PROGRESS NOTES
[facility]    Medicine Progress Note - Hospitalist Service    Date of Admission:  8/11/2024    Assessment & Plan     Julisa Chaney is a 79 year old female with PMH of trigeminal neuralgia, PUD, MDD, YUE, HLD, collagenous colitis hospitalization in May 2024, migraine headaches admitted on 8/11/2024 with:     #Aspiration pneumonia  #Sepsis, WBCs 29.0, left shift  #Acute respiratory failure with hypoxia  CT showed diffuse mucous plugging.  Tested negative for COVID-19, influenza, RSV  Most prominent episode of choking with tablets on 8/10.  Suspect either silent aspiration or results of aspiration, given CT findings with mucous plugging.  -check sputum cx, nasal MRSA culture  - Stop Unasyn  -Start Vancomycin, Cefepime, Flagyl  - DuoNeb, 7% saline nebs every 4 hours while awake  - RCAT  - Guaifenesin 100 mg twice daily  -SLP consult, VSS  -HOB 30 degrees  -Aspiration precautions  -NPO until swallow study complete  - Supplemental oxygen to keep SpO2 above 90%  -continuous oximetry  -wean O2 as tolerated     #Chronic pain syndrome due to trigeminal neuralgia.  #Trigeminal neuralgia  -continue PT nortriptyline, gabapentin, Trileptal, Tylenol 3.    #History of PUD  -Continue Protonix.    #MDD, insomnia  -continue  Seroquel, amitriptyline, metoprolol.    #Question history of epilepsy  -Patient denies and says for neuropathic pain.   -on Keppra.    #History of chronic diarrhea due to collagenous colitis.    #Underweight,Body mass index is 16.64 kg/m .    #Failure to thrive in adult    #Moderate malnutrition  - PT, OT, SW, RD        Diet: NPO for Medical/Clinical Reasons Except for: No Exceptions    DVT Prophylaxis: Enoxaparin (Lovenox) SQ  Abrams Catheter: Not present  Lines: None     Cardiac Monitoring: None  Code Status: Full Code      Clinically Significant Risk Factors Present on Admission          # Hypocalcemia: Lowest Ca = 8.6 mg/dL in last 2 days, will monitor and replace as appropriate     #  "Hypoalbuminemia: Lowest albumin = 3.2 g/dL at 8/12/2024  6:36 AM, will monitor as appropriate             # Cachexia: Estimated body mass index is 16.14 kg/m  as calculated from the following:    Height as of this encounter: 1.676 m (5' 6\").    Weight as of this encounter: 45.4 kg (100 lb).    # Moderate Malnutrition: based on nutrition assessment     # Financial/Environmental Concerns:                 Disposition Plan     Medically Ready for Discharge: Anticipated in 2-4 Days             Ebenezer Morales DO, DO  Hospitalist Service  [facility]  Securely message with [email or web address] (more info)  Text page via [place] Paging/Directory   ______________________________________________________________________    Interval History   Afebrile.  Events overnight noted.    Physical Exam   Vital Signs: Temp: 98.2  F (36.8  C) Temp src: Oral BP: 102/52 Pulse: 83   Resp: 20 SpO2: 90 % O2 Device: None (Room air) Oxygen Delivery: 2 LPM  Weight: 100 lbs 0 oz    Physical Examination:   General appearance - alert, and in no distress  Eyes - pupils equal and reactive, extraocular eye movements intact, sclera anicteric  Lungs - crackles rll/rml, LLL. No wheezing, non labored  Heart - normal rate, regular rhythm, normal S1, S2, no murmurs, rubs, clicks or gallops. No peripheral edema.  Abdomen - soft, nontender, nondistended, BS+  Neurological - alert, oriented, normal speech, no focal findings or movement disorder noted  Skin - no c/c/p    Lab/imaging reviewed  "

## 2024-08-12 NOTE — TREATMENT PLAN
RCAT Treatment Plan    Patient Score: 9  Patient Acuity: 4    Clinical Indication for Therapy:   Aspiration Pneumoniam mucus plugging on imaging    Therapy Ordered: DuoNeb and 7% saline nebs QID, Flutter valve QID    Assessment Summary: Pt is on room air. Crackles in all lobes, RR 20, no respiratory distress. Pt tolerated a Volera treatment. No clinical change or change to BS post Volera or neb treatments. Pt has been declining some of her treatments. Volera discontinued by MD. Will continue scheduled neb treatments and flutter valve due to breath sounds and imaging.      Caroline Allen, RT  8/12/2024

## 2024-08-12 NOTE — PROGRESS NOTES
"   08/12/24 1005   Appointment Info   Signing Clinician's Name / Credentials (PT) Mackenzie Laura PT, DPT   Living Environment   People in Home alone   Current Living Arrangements house   Home Accessibility no concerns;wheelchair accessible  (ramp to enter)   Living Environment Comments Pt's daughter lives nearby and comes over daily to assist with meals/cleaning   Self-Care   Usual Activity Tolerance moderate   Current Activity Tolerance moderate   Equipment Currently Used at Home wheelchair, manual;grab bar, tub/shower;shower chair;grab bar, toilet;walker, rolling   Fall history within last six months yes   Activity/Exercise/Self-Care Comment Primarily uses WC; states only uses FWW for amb in/out of bathroom. Sponge bathes ind. Transfers ind. Has high toilet at home.   General Information   Onset of Illness/Injury or Date of Surgery 08/11/24   Referring Physician Ebenezer Morales, DO   Patient/Family Therapy Goals Statement (PT) Get stronger, maintain IND   Pertinent History of Current Problem (include personal factors and/or comorbidities that impact the POC) Per chart: \"79 year old female with PMH of trigeminal neuralgia, PUD, MDD, YUE, HLD, collagenous colitis hospitalization in May 2024, migraine headaches admitted on 8/11/2024 with:\" aspiration pneumonia, sepsis, Acute respiratory failure   Existing Precautions/Restrictions fall   Cognition   Affect/Mental Status (Cognition) WNL   Follows Commands (Cognition) WNL   Pain Assessment   Patient Currently in Pain No   Range of Motion (ROM)   Range of Motion ROM deficits secondary to weakness   Strength (Manual Muscle Testing)   Strength (Manual Muscle Testing) Deficits observed during functional mobility   Bed Mobility   Bed Mobility supine-sit   Supine-Sit [place] (Bed Mobility) independent   Impairments Contributing to Impaired Bed Mobility decreased strength   Assistive Device (Bed Mobility) bed rails   Transfers   Transfers sit-stand transfer   Transfer " Safety Concerns Noted decreased balance during turns   Impairments Contributing to Impaired Transfers impaired balance;decreased strength   Comment, (Transfers) denies dizziness with sitting up/standing   Sit-Stand Transfer   Sit-Stand [place] (Transfers) supervision   Assistive Device (Sit-Stand Transfers) walker, front-wheeled   Gait/Stairs (Locomotion)   [place] Level (Gait) supervision;verbal cues;nonverbal cues (demo/gesture)   Assistive Device (Gait) walker, front-wheeled   Distance in Feet (Gait) 5'   Pattern (Gait) swing-through   Deviations/Abnormal Patterns (Gait) base of support, narrow;jake decreased;gait speed decreased   Balance   Balance other (describe)   Balance Comments FWW in standing/amb at baseline -sba today.   Clinical Impression   Criteria for Skilled Therapeutic Intervention Yes, treatment indicated   PT Diagnosis (PT) Impaired functional mobility   Influenced by the following impairments Impaired strength, balance, activity tolerance   Functional limitations due to impairments transfers, gait, endurance   Clinical Presentation (PT Evaluation Complexity) stable   Clinical Presentation Rationale clinical judgment   Clinical Decision Making (Complexity) low complexity   Planned Therapy Interventions (PT) balance training;bed mobility training;gait training;home exercise program;neuromuscular re-education;patient/family education;ROM (range of motion);strengthening;stretching;transfer training;wheelchair management/propulsion training;progressive activity/exercise;home program guidelines   Risk & Benefits of therapy have been explained evaluation/treatment results reviewed;care plan/treatment goals reviewed;risks/benefits reviewed;participants included;patient   PT Total Evaluation Time   PT Eval, Low Complexity Minutes (88785) 7   Physical Therapy Goals   PT Frequency Daily   PT Predicted Duration/Target Date for Goal Attainment 08/19/24   PT Goals Wheelchair  Mobility;Transfers;Gait   PT: Transfers Modified independent;Sit to/from stand;Bed to/from chair;Assistive device   PT: Gait 25 feet;Supervision/stand-by assist;Rolling walker   PT: Wheelchair Mobility manual wheelchair;150 feet;Caregiver SBA   Interventions   Interventions Quick Adds Therapeutic Activity;Wheelchair Mgmt/Training   Therapeutic Activity   Therapeutic Activities: dynamic activities to improve functional performance Minutes (66954) 16   Symptoms Noted During/After Treatment Fatigue   Treatment Detail/Skilled Intervention eval completed, tx initiated. stand<>sit on toilet Vaughn d/t low height of toilet, VC for use of grab bars, modA for clothing management.  within room amb with FWW SBA 10'. x3 sit<>stand wc<>FWW with cues for wide REGINA, slow eccentric, and BUE use, and posture in standing. sit<>stand transfers to/from wc & EOB CGA for safety, pt demos to be overall steady on feet with transfers. ModA for doff/ don new gown. IND sit>sup. Pt left supine with call light & all other needs in reach, bed alarm engaged.   Wheelchair Managment/Training   Wheelchair Mgmt/Training Minutes (43297) 8   Symptoms Noted During/After Treatment Fatigue   Treatment Detail/Skilled Intervention verbal cues for safe management of wc - ensuring brakes engaged, safe positioning of WC for transfers. facilitated WC propulsion with VC for environmental awareness total 50' SBA   PT Discharge Planning   PT Plan Prog transfers, amb with FWW & wc follow (pt's wc in room), wc mobility   PT Discharge Recommendation (DC Rec) home with assist;home with home care physical therapy   PT Rationale for DC Rec Patient IND with bed mobility, SBA-CGA for most transfers and amb with FWW. Recommend home with assist as needed along with home PT to help improve pt's safety and IND in her home, decrease fall risk, and help her return to PLOF.   PT Brief overview of current status SBA-CGA transfers & amb 10' FWW   Total Session Time   Timed Code  Treatment Minutes 24   Total Session Time (sum of timed and untimed services) 31

## 2024-08-12 NOTE — CONSULTS
Care Management Initial Consult    General Information  Assessment completed with: Julisa Patel  Type of CM/SW Visit: Initial Assessment    Primary Care Provider verified and updated as needed: Yes   Readmission within the last 30 days: no previous admission in last 30 days      Reason for Consult: discharge planning  Advance Care Planning: Advance Care Planning Reviewed: verified with patient, no concerns identified          Communication Assessment  Patient's communication style: spoken language (English or Bilingual)             Cognitive  Cognitive/Neuro/Behavioral: WDL                      Living Environment:   People in home: alone     Current living Arrangements: house      Able to return to prior arrangements: yes       Family/Social Support:  Care provided by: self, child(geetha)  Provides care for: no one, unable/limited ability to care for self  Marital Status:   Children          Description of Support System: Supportive, Involved    Support Assessment: Adequate family and caregiver support, Adequate social supports    Current Resources:   Patient receiving home care services: No     Community Resources: None  Equipment currently used at home: wheelchair, manual, grab bar, tub/shower, shower chair, grab bar, toilet, walker, rolling  Supplies currently used at home: None    Employment/Financial:  Employment Status: retired     Employment/ Comments:  was a  and she is not sure what benefits she has  Financial Concerns: none   Referral to Financial Worker: No       Does the patient's insurance plan have a 3 day qualifying hospital stay waiver?  Yes     Which insurance plan 3 day waiver is available? Alternative insurance waiver    Will the waiver be used for post-acute placement? Undetermined at this time    Lifestyle & Psychosocial Needs:  Social Determinants of Health     Food Insecurity: No Food Insecurity (4/9/2024)    Received from [facility]    Food Insecurity     Worried About Running Out of Food in the Last Year: 1   Depression: Not at risk (1/11/2024)    PHQ-2     PHQ-2 Score: 0   Housing Stability: Low Risk  (4/9/2024)    Received from [facility]    Housing Stability     Unable to Pay for Housing in the Last Year: 1   Tobacco Use: Medium Risk (4/9/2024)    Received from [facility]    Patient History     Smoking Tobacco Use: Former     Smokeless Tobacco Use: Never     Passive Exposure: Not on file   Financial Resource Strain: Low Risk  (4/9/2024)    Received from [facility]    Financial Resource Strain     Difficulty of Paying Living Expenses: 3     Difficulty of Paying Living Expenses: Not on file   Alcohol Use: Not on file   Transportation Needs: Unmet Transportation Needs (4/9/2024)    Received from [facility]    Transportation Needs     Lack of Transportation (Medical): 2   Physical Activity: Not on file   Interpersonal Safety: Not on file   Stress: Not on file   Social Connections: Socially Integrated (4/9/2024)    Received from [facility]    Social Connections     Frequency of Communication with Friends and Family: 0   Health Literacy: Not on file       Functional Status:  Prior to admission patient needed assistance:   Dependent ADLs:: Wheelchair-independent  Dependent IADLs:: Cleaning, Cooking, Laundry, Shopping, Meal Preparation, Medication Management, Money Management, Transportation       Mental Health Status:  Mental Health Status: No Current Concerns       Chemical Dependency Status:  Chemical Dependency Status: No Current Concerns                   Additional Information:  CM met with patient in patient's room. Patient lives in a wheelchair accessible house alone.  Daughter Alissa comes over at least 3 times a day to prepare meals and takes care of household.  " Patient reports she is able to bathe and get dressed on her own.  Patient uses wheelchair mostly and sometimes is able to use walker.  No home care currently and at this time patient is declining home care as she felt it \"didn't do much\".  Therapy recommending home care/ home with assist.  CM will revisit this closer to discharge.  Patient feels she is safe to discharge home with her daughters current level of support.     CM will continue to monitor medical progression and aid in discharge planning.       Lizzette Lee RN      "

## 2024-08-12 NOTE — PHARMACY-VANCOMYCIN DOSING SERVICE
"Pharmacy Vancomycin Initial Note  Date of Service 2024  Patient's  1945  79 year old, female    Indication: Aspiration Pneumonia    Current estimated CrCl = Estimated Creatinine Clearance: 52.7 mL/min (based on SCr of 0.62 mg/dL).    Creatinine for last 3 days  2024: 12:30 PM Creatinine 0.53 mg/dL  2024:  6:36 AM Creatinine 0.62 mg/dL    Recent Vancomycin Level(s) for last 3 days  No results found for requested labs within last 3 days.      Vancomycin IV Administrations (past 72 hours)                     vancomycin (VANCOCIN) 1,250 mg in sodium chloride 0.9 % 250 mL intermittent infusion (mg) 1,250 mg New Bag 24 1232                    Nephrotoxins and other renal medications (From now, onward)      Start     Dose/Rate Route Frequency Ordered Stop    24 1200  vancomycin (VANCOCIN) 1,000 mg in 200 mL dextrose intermittent infusion        Placed in \"Followed by\" Linked Group    1,000 mg  200 mL/hr over 1 Hours Intravenous EVERY 24 HOURS 24 1020      24 1100  vancomycin (VANCOCIN) 1,250 mg in sodium chloride 0.9 % 250 mL intermittent infusion        Placed in \"Followed by\" Linked Group    1,250 mg  over 90 Minutes Intravenous EVERY 24 HOURS 24 1020 24 1059            Contrast Orders - past 72 hours (72h ago, onward)      None            [employer] Prediction of Planned Initial Vancomycin Regimen  Loading dose: 1250 mg IV  Regimen: 1000 mg IV every 24 hours.  Start time: 00:32 on 2024  Exposure target: AUC24 (range)400-600 mg/L.hr   AUC24,ss: 427 mg/L.hr  Probability of AUC24 > 400: 57 %  Ctrough,ss: 11.5 mg/L  Probability of Ctrough,ss > 20: 11 %  Probability of nephrotoxicity (Lodise ESTIVEN ): 7 %          Plan:  Start vancomycin  1000 mg IV q24h.   Vancomycin monitoring method: AUC  Vancomycin therapeutic monitoring goal: 400-600 mg*h/L  Pharmacy will check vancomycin levels as appropriate in 1-3 Days.    Serum creatinine levels will be ordered " daily for the first week of therapy and at least twice weekly for subsequent weeks.      Lisset Dougherty, [facility]

## 2024-08-12 NOTE — PLAN OF CARE
Problem: Adult Inpatient Plan of Care  Goal: Plan of Care Review  Description: The Plan of Care Review/Shift note should be completed every shift.  The Outcome Evaluation is a brief statement about your assessment that the patient is improving, declining, or no change.  This information will be displayed automatically on your shift  note.  Outcome: Progressing   Goal Outcome Evaluation:         Pt alert and oriented x4, complains of headache, schedule pain meds give, takes med's whole with applesauce, effective,on room air, MRSA: contact precaution, on Mg and K protocol, slept between cares

## 2024-08-12 NOTE — PROGRESS NOTES
Speech-Language Pathology: Video Swallow Study     08/12/24 1400   Appointment Info   Signing Clinician's Name / Credentials (SLP) Nadeen Power MA CCC-SLP   General Information   Onset of Illness/Injury or Date of Surgery 08/11/24   Referring Physician Dr. Morales   Pertinent History of Current Problem Per EMR: 79 year old female with PMH of trigeminal neuralgia, PUD, MDD, YUE, HLD, collagenous colitis hospitalization in May 2024, migraine headaches admitted on 8/11/2024 with:     #Aspiration pneumonia  #Sepsis, WBCs 29.0, left shift  #Acute respiratory failure with hypoxia  CT showed diffuse mucous plugging.  Tested negative for COVID-19, influenza, RSV  UA pending.  Most prominent episode of choking with tablets on 8/10.  Suspect either silent aspiration or results of aspiration, given CT findings with mucous plugging.  - Unasyn for aspiration pneumonia  - DuoNeb, 7% saline nebs every 4 hours while awake  - RCAT  - Guaifenesin 100 mg twice daily  - Video swallow in a.m.  - Supplemental oxygen to keep SpO2 above 20%  - Monitor closely for signs of severe sepsis/septic shock   General Observations Alert and cooperative   Type of Evaluation   Type of Evaluation Swallow Evaluation   Oral Motor   Oral Musculature generally intact   Dentition (Oral Motor)   Dentition (Oral Motor) significant number of missing teeth  (reports having softer foods to compensate)   Facial Symmetry (Oral Motor)   Facial Symmetry (Oral Motor) WNL   Lip Function (Oral Motor)   Comment, Lip Function (Oral Motor) WFL, consistent with age group   Tongue Function (Oral Motor)   Comment, Tongue Function (Oral Motor) WFL, consistent with age group   Vocal Quality/Secretion Management (Oral Motor)   Vocal Quality (Oral Motor) WFL   General Swallowing Observations   Past History of Dysphagia VFSS 7/3/2023: No direct aspiration with thin or  mildly thick but risk noted due to penetration that does not clear. No aspiration or penetration with chin tuck  posture. Significant upper esophageal stasis with puree texture, unclear impact of sticky barium paste vs dysmotility, suspect at least some component of dysmotility; this may further impact aspiration risk from retrograde flow. Consider esophagram or EGD. Recommendation for soft and bite size and thin liquids with chin tuck   Comment, General Swallowing Observations Admitted with episode of choking on pills. Patient reports she took more than one at a time and had trouble. She reports taking pills whole in applesauce typically. Chest imaging: Multiple patchy opacities and consolidation seen in the right lung with largest consolidation in the right lower lobe. Scattered tree-in-bud nodules also seen in the superior segment of the left lower lobe and lingula, compatible with   an infectious etiology as well. No pleural effusion or pneumothorax. An 8 mm nodule is seen in the superior segment of the right lower lobe on series 4 image 121. A 4 mm nodule in the right upper lobe on series 4 image 77. Mild emphysema is present   Current Diet/Method of Nutritional Intake (General Swallowing Observations, NIS) NPO  (until VFSS)   Swallowing Evaluation Videofluoroscopic swallow study (VFSS)  (CSE completed prior to exam via patient interview and OME.)   VFSS Evaluation   Radiologist Dr. Stinson   Views Taken left lateral   Physical Location of Procedure [facility]   VFSS Textures Trialed thin liquids;mildly thick liquids;pureed;solid foods   VFSS Eval: Thin Liquid Texture Trial   Mode of Presentation, Thin Liquid cup   Preparatory Phase WFL   Oral Phase, Thin Liquid WFL   Bolus Location When Swallow Triggered posterior laryngeal surface of epiglottis;pyriforms   Pharyngeal Phase, Thin Liquid WFL;impaired pharyngoesophageal segment opening   Rosenbek's Penetration Aspiration Scale: Thin Liquid Trial Results 8 - contrast passes glottis, visible subglottic residue remains, absent patient response (aspiration)  (trace,  trickle down aspiration from penetrated material;)   Response to Aspiration absent response  (partially productive cued cough)   Strategies and Compensations chin tuck   Diagnostic Statement chin tuck reduced penetration and aspiration but did not eliminate risk   VFSS Eval: Mildly Thick Liquids   Mode of Presentation cup;spoon;self-fed;fed by clinician   Preparatory Phase WFL   Oral Phase WFL   Bolus Location When Swallow Triggered valleculae   Pharyngeal Phase WFL;impaired pharyngoesophageal segment opening   Rosenbek's Penetration Aspiration Scale 1 - no aspiration, contrast does not enter airway   VFSS Evaluation: Puree Solid Texture Trial   Mode of Presentation, Puree spoon;fed by clinician   Preparatory Phase WFL   Oral Phase, Puree WFL   Bolus Location When Swallow Triggered valleculae   Pharyngeal Phase, Puree WFL;residue in vallecula;residue in pyriform sinus;impaired pharyngoesophageal segment opening   Rosenbek's Penetration Aspiration Scale: Puree Food Trial Results 3 - contrast remains above the vocal cords, visible residue remains (penetration)  (isolated episode, talking during intake; not reproducible with another trial and cleared with cued cough)   VFSS Evaluation: Solid Food Texture Trial   Mode of Presentation, Solid fed by clinician   Preparatory Phase WFL   Oral Phase, Solid WFL   Bolus Location When Swallow Triggered valleculae   Pharyngeal Phase, Solid WFL   Rosenbek's Penetration Aspiration Scale: Solid Food Trial Results 1 - no aspiration, contrast does not enter airway   Esophageal Phase of Swallow   Patient reports or presents with symptoms of esophageal dysphagia Yes   Esophageal comments visible residuals with puree, cleared with mildly thick liquid wash;   Swallowing Recommendations   Diet Consistency Recommendations mildly thick liquids (level 2);easy to chew (level 7)   Supervision Level for Intake distant supervision needed   Mode of Delivery Recommendations bolus size, small;slow  rate of intake   Swallowing Maneuver Recommendations alternate food and liquid intake   Monitoring/Assistance Required (Eating/Swallowing) monitor for cough or change in vocal quality with intake;stop eating activities when fatigue is present   Recommended Feeding/Eating Techniques (Swallow Eval) maintain upright sitting position for eating;maintain upright posture during/after eating for 30 minutes;minimize distractions during oral intake;set-up and prepare tray   Medication Administration Recommendations, Swallowing (SLP) crushed in puree or mildly thickened liquid   Instrumental Assessment Recommendations VFSS (videofluoroscopic swallowing study)  (Repeat in 4-6 weeks or as deemed appropriate)   Comment, Swallowing Recommendations Trace, delayed aspiration with no immediate cough reflex with thin liquids, no aspiration or penetration with mildly thick liquids. Questionable risk with puree, recommend cautious use of slow rate of intake and small bites and sips to reduce risk and crush meds in puree as able. Will pursue water protocol as deemed appropriate.   General Therapy Interventions   Planned Therapy Interventions Dysphagia Treatment   Clinical Impression   Criteria for Skilled Therapeutic Interventions Met (SLP Eval) Yes, treatment indicated   SLP Diagnosis Dysphagia   Risks & Benefits of therapy have been explained evaluation/treatment results reviewed;care plan/treatment goals reviewed;current/potential barriers reviewed;risks/benefits reviewed;participants included;patient   Clinical Impression Comments Videofluoroscopic Swallow Study completed. Patient had trace, trickle down aspiration with thin liquids; risk reduced but not eliminated with chin tuck posture.  Consistent penetration with thin liquids, trace amount but residuals in supraglottic space. Episode of penetration with puree due to stasis/sticking on posterior pharyngeal wall, not reproducible in other trials and likely related to sticky barium  texture. Oral phase is WFL. Tongue base retraction was minimally reduced. Swallow response was mildly delayed. Epiglottic inversion was slow but complete.  Mild stasis occurred with puree on first trial, no residuals on other trials. Hyolaryngeal elevation was adequate and hyolaryngeal excursion was adequate.  Mastication slow but complete, increased effort due to limited dentition. Cricopharyngeus has appearance of a thin prominence or tiny web but complete bolus passage. Recommend mildy thick liquids at this time and pursue water protocol with chin tuck as appropriate. Medications crushed in puree. SLP to follow for dysphagia management. Recommend repeat VFSS in 4-6 weeks to assess for thin liquid upgrade.   SLP Total Evaluation Time   Evaluation, videofluoroscopic eval of swallow function Minutes (33770) 15   SLP Goals   Therapy Frequency (SLP Eval) 5 times/week   SLP Predicted Duration/Target Date for Goal Attainment 08/19/24   SLP Goals Swallow   SLP: Safely tolerate diet without signs/symptoms of aspiration Easy to chew diet;Mildly thick liquids;With use of swallow precautions;With use of compensatory swallow strategies   Interventions   Interventions Quick Adds Swallowing Dysfunction   Swallowing Dysfunction &/or Oral Function for Feeding   Treatment of Swallowing Dysfunction &/or Oral Function for Feeding Minutes (04313) 8   Treatment Detail/Skilled Intervention Patient able to readily complete chin tuck posture and recall with liquids. Instructed on use even with mildly thick liquids to maintain habit for hopeful upgrade to thin liquids in the future. Instructed on small bites and sips strategy and slow rate of intake.   SLP Discharge Planning   SLP Plan Tue 1/5; diet f/u, thickened liquid training and sample box, review crushed meds, introduce water protocol   SLP Rationale for DC Rec ongoing ST at d/c to be determined   SLP Brief overview of current status  ETC and Mildly thick liquids. VFSS showed trace  aspiration, trickle down with thin liquids. Chin tuck improved but did not eliminate risk. Recommend mildy thick liquids at this time and pursue water protocol with chin tuck as appropriate. Medications crushed in puree.  Recommend repeat VFSS in 4-6 weeks to assess for thin liquid upgrade. SLP to follow for dypshagia management, diet tolerance and diet advancement as appopriate.   Total Session Time   Total Session Time (sum of timed and untimed services) 23

## 2024-08-12 NOTE — PROGRESS NOTES
08/12/24 1030   Appointment Info   Signing Clinician's Name / Credentials (OT) Rubi Marrufo OTD OTR/L   Living Environment   People in Home alone   Current Living Arrangements house   Home Accessibility wheelchair accessible  (ramp to enter)   Living Environment Comments Tub/shower with grab bars, completes sponge bathing, comfort height toilets   Self-Care   Equipment Currently Used at Home wheelchair, manual;grab bar, tub/shower;shower chair;grab bar, toilet;walker, rolling   Activity/Exercise/Self-Care Comment Typically ind with most ADLs in home setting - uses WC in home and FWW to go to bathroom. Daughter lives in neighborhood - comes over to assist with meals, bathing, cleaning, household tasks, and transportation   General Information   Onset of Illness/Injury or Date of Surgery 08/11/24   Referring Physician Ebenezer Morales DO   Patient/Family Therapy Goal Statement (OT) To return home   Additional Occupational Profile Info/Pertinent History of Current Problem Julisa Chaney is a 79 year old female with PMH of trigeminal neuralgia, PUD, MDD, YUE, HLD, collagenous colitis hospitalization in May 2024, migraine headaches admitted on 8/11/2024   Existing Precautions/Restrictions fall   Cognitive Status Examination   Orientation Status orientation to person, place and time   Affect/Mental Status (Cognitive) WFL   Follows Commands WFL   Visual Perception   Visual Impairment/Limitations WFL   Posture   Posture forward head position;kyphosis   Range of Motion Comprehensive   General Range of Motion bilateral upper extremity ROM WFL   Strength Comprehensive (MMT)   Comment, General Manual Muscle Testing (MMT) Assessment Min generalized weakness   Bed Mobility   Bed Mobility supine-sit   Supine-Sit [place] (Bed Mobility) supervision   Assistive Device (Bed Mobility) bed rails   Transfers   Transfers sit-stand transfer;toilet transfer   Sit-Stand Transfer   Sit-Stand [place] (Transfers) minimum assist  (75% patient effort)   Toilet Transfer   [place] Level (Toilet Transfer) contact guard   Balance   Balance Assessment standing dynamic balance   Standing Balance: Dynamic contact guard   Activities of Daily Living   BADL Assessment/Intervention lower body dressing;toileting   Lower Body Dressing Assessment/Training   [place] Level (Lower Body Dressing) minimum assist (75% patient effort)   Toileting   [place] Level (Toileting) minimum assist (75% patient effort)   Clinical Impression   Criteria for Skilled Therapeutic Interventions Met (OT) Yes, treatment indicated   OT Diagnosis Decreased ind with ADLs and safety   Influenced by the following impairments Pneumonia of R lung   OT Problem List-Impairments impacting ADL activity tolerance impaired;mobility;strength   Assessment of Occupational Performance 1-3 Performance Deficits   Identified Performance Deficits dressing, toileting, activity tolerance   Planned Therapy Interventions (OT) ADL retraining;transfer training;progressive activity/exercise   Clinical Decision Making Complexity (OT) problem focused assessment/low complexity   Risk & Benefits of therapy have been explained evaluation/treatment results reviewed;care plan/treatment goals reviewed;risks/benefits reviewed;current/potential barriers reviewed;patient   OT Total Evaluation Time   OT Eval, Low Complexity Minutes (39690) 8   OT Goals   Therapy Frequency (OT) 5 times/week   OT Predicted Duration/Target Date for Goal Attainment 08/19/24   OT Goals Lower Body Dressing;Toilet Transfer/Toileting   OT: Lower Body Dressing Supervision/stand-by assist   OT: Toilet Transfer/Toileting Supervision/stand-by assist;toilet transfer;cleaning and garment management   Self-Care/Home Management   Self-Care/Home Mgmt/ADL, Compensatory, Meal Prep Minutes (50603) 8   Symptoms Noted During/After Treatment (Meal Preparation/Planning Training) none   Treatment Detail/Skilled Intervention Eval completed,  treatment initiated. Min cueing for walker safety with toilet transfer, min A for controlled descent to toilet. Min A STS from lower toilet surface. Stand g/h ~ 2 minutes SBA. REturned to supine CGA, able to reposition self in bed.   OT Discharge Planning   OT Plan Progress transfers, safety with toileting, dressing   OT Discharge Recommendation (DC Rec) home with home care occupational therapy;home with assist   OT Rationale for DC Rec Pt has adequate assist from daughter at home - may benefit from home OT to enhance ind and safety with ADLs   OT Brief overview of current status CGA fxl mob and transfers, SBA bed mob   Total Session Time   Timed Code Treatment Minutes 8   Total Session Time (sum of timed and untimed services) 16

## 2024-08-13 ENCOUNTER — APPOINTMENT (OUTPATIENT)
Dept: PHYSICAL THERAPY | Facility: HOSPITAL | Age: 79
DRG: 871 | End: 2024-08-13
Payer: COMMERCIAL

## 2024-08-13 ENCOUNTER — APPOINTMENT (OUTPATIENT)
Dept: SPEECH THERAPY | Facility: HOSPITAL | Age: 79
DRG: 871 | End: 2024-08-13
Payer: COMMERCIAL

## 2024-08-13 LAB
ALBUMIN SERPL BCG-MCNC: 3 G/DL (ref 3.5–5.2)
ALP SERPL-CCNC: 108 U/L (ref 40–150)
ALT SERPL W P-5'-P-CCNC: 17 U/L (ref 0–50)
ANION GAP SERPL CALCULATED.3IONS-SCNC: 9 MMOL/L (ref 7–15)
AST SERPL W P-5'-P-CCNC: 11 U/L (ref 0–45)
ATRIAL RATE - MUSE: 70 BPM
ATRIAL RATE - MUSE: 88 BPM
BASOPHILS # BLD AUTO: 0.1 10E3/UL (ref 0–0.2)
BASOPHILS NFR BLD AUTO: 1 %
BILIRUB SERPL-MCNC: 0.2 MG/DL
BUN SERPL-MCNC: 12.3 MG/DL (ref 8–23)
CALCIUM SERPL-MCNC: 8.6 MG/DL (ref 8.8–10.4)
CHLORIDE SERPL-SCNC: 107 MMOL/L (ref 98–107)
CREAT SERPL-MCNC: 0.61 MG/DL (ref 0.51–0.95)
DIASTOLIC BLOOD PRESSURE - MUSE: 56 MMHG
DIASTOLIC BLOOD PRESSURE - MUSE: NORMAL MMHG
EGFRCR SERPLBLD CKD-EPI 2021: 90 ML/MIN/1.73M2
EOSINOPHIL # BLD AUTO: 0.2 10E3/UL (ref 0–0.7)
EOSINOPHIL NFR BLD AUTO: 2 %
ERYTHROCYTE [DISTWIDTH] IN BLOOD BY AUTOMATED COUNT: 15.3 % (ref 10–15)
GLUCOSE SERPL-MCNC: 107 MG/DL (ref 70–99)
HCO3 SERPL-SCNC: 25 MMOL/L (ref 22–29)
HCT VFR BLD AUTO: 36.1 % (ref 35–47)
HGB BLD-MCNC: 11 G/DL (ref 11.7–15.7)
IMM GRANULOCYTES # BLD: 0.1 10E3/UL
IMM GRANULOCYTES NFR BLD: 1 %
INTERPRETATION ECG - MUSE: NORMAL
INTERPRETATION ECG - MUSE: NORMAL
LACTATE SERPL-SCNC: 0.4 MMOL/L (ref 0.7–2)
LYMPHOCYTES # BLD AUTO: 1.4 10E3/UL (ref 0.8–5.3)
LYMPHOCYTES NFR BLD AUTO: 12 %
MAGNESIUM SERPL-MCNC: 1.9 MG/DL (ref 1.7–2.3)
MCH RBC QN AUTO: 26.1 PG (ref 26.5–33)
MCHC RBC AUTO-ENTMCNC: 30.5 G/DL (ref 31.5–36.5)
MCV RBC AUTO: 86 FL (ref 78–100)
MONOCYTES # BLD AUTO: 0.8 10E3/UL (ref 0–1.3)
MONOCYTES NFR BLD AUTO: 7 %
NEUTROPHILS # BLD AUTO: 8.8 10E3/UL (ref 1.6–8.3)
NEUTROPHILS NFR BLD AUTO: 78 %
NRBC # BLD AUTO: 0 10E3/UL
NRBC BLD AUTO-RTO: 0 /100
P AXIS - MUSE: 44 DEGREES
P AXIS - MUSE: 50 DEGREES
PLATELET # BLD AUTO: 238 10E3/UL (ref 150–450)
POTASSIUM SERPL-SCNC: 3.6 MMOL/L (ref 3.4–5.3)
PR INTERVAL - MUSE: 148 MS
PR INTERVAL - MUSE: 150 MS
PROT SERPL-MCNC: 6.3 G/DL (ref 6.4–8.3)
QRS DURATION - MUSE: 68 MS
QRS DURATION - MUSE: 74 MS
QT - MUSE: 346 MS
QT - MUSE: 384 MS
QTC - MUSE: 414 MS
QTC - MUSE: 418 MS
R AXIS - MUSE: 13 DEGREES
R AXIS - MUSE: 24 DEGREES
RBC # BLD AUTO: 4.21 10E6/UL (ref 3.8–5.2)
SODIUM SERPL-SCNC: 141 MMOL/L (ref 135–145)
SYSTOLIC BLOOD PRESSURE - MUSE: 113 MMHG
SYSTOLIC BLOOD PRESSURE - MUSE: NORMAL MMHG
T AXIS - MUSE: 40 DEGREES
T AXIS - MUSE: 53 DEGREES
TROPONIN T SERPL HS-MCNC: 68 NG/L
TROPONIN T SERPL HS-MCNC: 76 NG/L
VENTRICULAR RATE- MUSE: 70 BPM
VENTRICULAR RATE- MUSE: 88 BPM
WBC # BLD AUTO: 11.2 10E3/UL (ref 4–11)

## 2024-08-13 PROCEDURE — 36415 COLL VENOUS BLD VENIPUNCTURE: CPT | Performed by: INTERNAL MEDICINE

## 2024-08-13 PROCEDURE — 94640 AIRWAY INHALATION TREATMENT: CPT | Mod: 76

## 2024-08-13 PROCEDURE — 93005 ELECTROCARDIOGRAM TRACING: CPT

## 2024-08-13 PROCEDURE — 92526 ORAL FUNCTION THERAPY: CPT | Mod: GN

## 2024-08-13 PROCEDURE — 258N000003 HC RX IP 258 OP 636: Performed by: INTERNAL MEDICINE

## 2024-08-13 PROCEDURE — 80053 COMPREHEN METABOLIC PANEL: CPT | Performed by: INTERNAL MEDICINE

## 2024-08-13 PROCEDURE — 85025 COMPLETE CBC W/AUTO DIFF WBC: CPT | Performed by: INTERNAL MEDICINE

## 2024-08-13 PROCEDURE — 250N000011 HC RX IP 250 OP 636: Performed by: INTERNAL MEDICINE

## 2024-08-13 PROCEDURE — 83735 ASSAY OF MAGNESIUM: CPT | Performed by: INTERNAL MEDICINE

## 2024-08-13 PROCEDURE — 93010 ELECTROCARDIOGRAM REPORT: CPT | Performed by: STUDENT IN AN ORGANIZED HEALTH CARE EDUCATION/TRAINING PROGRAM

## 2024-08-13 PROCEDURE — 250N000009 HC RX 250: Performed by: INTERNAL MEDICINE

## 2024-08-13 PROCEDURE — 93005 ELECTROCARDIOGRAM TRACING: CPT | Performed by: INTERNAL MEDICINE

## 2024-08-13 PROCEDURE — 120N000001 HC R&B MED SURG/OB

## 2024-08-13 PROCEDURE — 99233 SBSQ HOSP IP/OBS HIGH 50: CPT | Performed by: INTERNAL MEDICINE

## 2024-08-13 PROCEDURE — 94799 UNLISTED PULMONARY SVC/PX: CPT

## 2024-08-13 PROCEDURE — 84484 ASSAY OF TROPONIN QUANT: CPT | Performed by: INTERNAL MEDICINE

## 2024-08-13 PROCEDURE — 97530 THERAPEUTIC ACTIVITIES: CPT | Mod: GP

## 2024-08-13 PROCEDURE — 97116 GAIT TRAINING THERAPY: CPT | Mod: GP

## 2024-08-13 PROCEDURE — 250N000013 HC RX MED GY IP 250 OP 250 PS 637: Performed by: INTERNAL MEDICINE

## 2024-08-13 PROCEDURE — 999N000157 HC STATISTIC RCP TIME EA 10 MIN

## 2024-08-13 PROCEDURE — 83605 ASSAY OF LACTIC ACID: CPT | Performed by: INTERNAL MEDICINE

## 2024-08-13 PROCEDURE — 94640 AIRWAY INHALATION TREATMENT: CPT

## 2024-08-13 RX ORDER — SODIUM CHLORIDE 9 MG/ML
INJECTION, SOLUTION INTRAVENOUS CONTINUOUS
Status: ACTIVE | OUTPATIENT
Start: 2024-08-13 | End: 2024-08-14

## 2024-08-13 RX ADMIN — POTASSIUM CHLORIDE 20 MEQ: 1500 TABLET, EXTENDED RELEASE ORAL at 09:01

## 2024-08-13 RX ADMIN — LEVETIRACETAM 750 MG: 500 TABLET, FILM COATED ORAL at 09:01

## 2024-08-13 RX ADMIN — CEFEPIME HYDROCHLORIDE 2 G: 2 INJECTION, POWDER, FOR SOLUTION INTRAVENOUS at 18:25

## 2024-08-13 RX ADMIN — METRONIDAZOLE 500 MG: 5 INJECTION, SOLUTION INTRAVENOUS at 00:13

## 2024-08-13 RX ADMIN — ACETAMINOPHEN AND CODEINE PHOSPHATE 1 TABLET: 300; 30 TABLET ORAL at 02:56

## 2024-08-13 RX ADMIN — SODIUM CHLORIDE 500 ML: 9 INJECTION, SOLUTION INTRAVENOUS at 09:01

## 2024-08-13 RX ADMIN — PANTOPRAZOLE SODIUM 40 MG: 20 TABLET, DELAYED RELEASE ORAL at 09:02

## 2024-08-13 RX ADMIN — QUETIAPINE FUMARATE 50 MG: 25 TABLET ORAL at 21:10

## 2024-08-13 RX ADMIN — PSYLLIUM HUSK 1 PACKET: 3.4 POWDER ORAL at 09:01

## 2024-08-13 RX ADMIN — Medication 4 ML: at 19:41

## 2024-08-13 RX ADMIN — Medication 50 MCG: at 09:01

## 2024-08-13 RX ADMIN — FERROUS SULFATE TAB 325 MG (65 MG ELEMENTAL FE) 325 MG: 325 (65 FE) TAB at 09:01

## 2024-08-13 RX ADMIN — VANCOMYCIN HYDROCHLORIDE 1000 MG: 1 INJECTION, SOLUTION INTRAVENOUS at 13:13

## 2024-08-13 RX ADMIN — IPRATROPIUM BROMIDE AND ALBUTEROL SULFATE 3 ML: .5; 3 SOLUTION RESPIRATORY (INHALATION) at 16:10

## 2024-08-13 RX ADMIN — LEVETIRACETAM 750 MG: 500 TABLET, FILM COATED ORAL at 21:10

## 2024-08-13 RX ADMIN — ACETAMINOPHEN AND CODEINE PHOSPHATE 1 TABLET: 300; 30 TABLET ORAL at 21:10

## 2024-08-13 RX ADMIN — METRONIDAZOLE 500 MG: 5 INJECTION, SOLUTION INTRAVENOUS at 23:57

## 2024-08-13 RX ADMIN — Medication 4 ML: at 08:09

## 2024-08-13 RX ADMIN — Medication 4 ML: at 16:10

## 2024-08-13 RX ADMIN — GUAIFENESIN 1200 MG: 600 TABLET ORAL at 21:08

## 2024-08-13 RX ADMIN — TOPIRAMATE 50 MG: 25 TABLET, FILM COATED ORAL at 21:11

## 2024-08-13 RX ADMIN — ENOXAPARIN SODIUM 30 MG: 30 INJECTION SUBCUTANEOUS at 21:12

## 2024-08-13 RX ADMIN — MIRTAZAPINE 15 MG: 7.5 TABLET, FILM COATED ORAL at 21:09

## 2024-08-13 RX ADMIN — OXCARBAZEPINE 450 MG: 150 TABLET, FILM COATED ORAL at 21:11

## 2024-08-13 RX ADMIN — IPRATROPIUM BROMIDE AND ALBUTEROL SULFATE 3 ML: .5; 3 SOLUTION RESPIRATORY (INHALATION) at 08:09

## 2024-08-13 RX ADMIN — CEFEPIME HYDROCHLORIDE 2 G: 2 INJECTION, POWDER, FOR SOLUTION INTRAVENOUS at 10:54

## 2024-08-13 RX ADMIN — NORTRIPTYLINE HYDROCHLORIDE 50 MG: 50 CAPSULE ORAL at 21:09

## 2024-08-13 RX ADMIN — GABAPENTIN 100 MG: 100 CAPSULE ORAL at 09:02

## 2024-08-13 RX ADMIN — METRONIDAZOLE 500 MG: 5 INJECTION, SOLUTION INTRAVENOUS at 11:55

## 2024-08-13 RX ADMIN — GABAPENTIN 300 MG: 300 CAPSULE ORAL at 21:09

## 2024-08-13 RX ADMIN — SODIUM CHLORIDE 500 ML: 9 INJECTION, SOLUTION INTRAVENOUS at 10:52

## 2024-08-13 RX ADMIN — CEFEPIME HYDROCHLORIDE 2 G: 2 INJECTION, POWDER, FOR SOLUTION INTRAVENOUS at 02:49

## 2024-08-13 RX ADMIN — SODIUM CHLORIDE: 9 INJECTION, SOLUTION INTRAVENOUS at 18:24

## 2024-08-13 RX ADMIN — ACETAMINOPHEN AND CODEINE PHOSPHATE 1 TABLET: 300; 30 TABLET ORAL at 16:31

## 2024-08-13 RX ADMIN — ACETAMINOPHEN AND CODEINE PHOSPHATE 1 TABLET: 300; 30 TABLET ORAL at 09:02

## 2024-08-13 RX ADMIN — IPRATROPIUM BROMIDE AND ALBUTEROL SULFATE 3 ML: .5; 3 SOLUTION RESPIRATORY (INHALATION) at 19:41

## 2024-08-13 RX ADMIN — GUAIFENESIN 1200 MG: 600 TABLET ORAL at 09:02

## 2024-08-13 ASSESSMENT — ACTIVITIES OF DAILY LIVING (ADL)
DIFFICULTY_EATING/SWALLOWING: YES
CONCENTRATING,_REMEMBERING_OR_MAKING_DECISIONS_DIFFICULTY: NO
ADLS_ACUITY_SCORE: 42
ADLS_ACUITY_SCORE: 42
TOILETING_ISSUES: NO
ADLS_ACUITY_SCORE: 44
ADLS_ACUITY_SCORE: 40
WALKING_OR_CLIMBING_STAIRS: AMBULATION DIFFICULTY, REQUIRES EQUIPMENT
ADLS_ACUITY_SCORE: 42
EQUIPMENT_CURRENTLY_USED_AT_HOME: SHOWER CHAIR;GRAB BAR, TUB/SHOWER
ADLS_ACUITY_SCORE: 40
ADLS_ACUITY_SCORE: 42
ADLS_ACUITY_SCORE: 42
ADLS_ACUITY_SCORE: 44
ADLS_ACUITY_SCORE: 40
ADLS_ACUITY_SCORE: 44
ADLS_ACUITY_SCORE: 44
ADLS_ACUITY_SCORE: 42
ADLS_ACUITY_SCORE: 44
ADLS_ACUITY_SCORE: 42
WEAR_GLASSES_OR_BLIND: YES
FALL_HISTORY_WITHIN_LAST_SIX_MONTHS: NO
DIFFICULTY_COMMUNICATING: NO
DRESSING/BATHING: BATHING DIFFICULTY, ASSISTANCE 1 PERSON
ADLS_ACUITY_SCORE: 44
ADLS_ACUITY_SCORE: 44
ADLS_ACUITY_SCORE: 40
DRESSING/BATHING_DIFFICULTY: YES
ADLS_ACUITY_SCORE: 42
WALKING_OR_CLIMBING_STAIRS_DIFFICULTY: YES
ADLS_ACUITY_SCORE: 40
ADLS_ACUITY_SCORE: 42
DOING_ERRANDS_INDEPENDENTLY_DIFFICULTY: YES
HEARING_DIFFICULTY_OR_DEAF: NO

## 2024-08-13 NOTE — PROGRESS NOTES
CLINICAL NUTRITION SERVICES - BRIEF NOTE     Nutrition Prescription    RECOMMENDATIONS FOR MDs/PROVIDERS TO ORDER:    Malnutrition Status:    Moderate malnutrition In Context of:  Chronic illness or disease    Recommendations already ordered by Registered Dietitian (RD):  ONS- Prince gel plus, Berry Magic cup     Future/Additional Recommendations:  Monitor po intake, supplement victor m, weight, labs, I/Os.      EVALUATION OF THE PROGRESS TOWARD GOALS   Diet: Level 7: Easy to Chew Dysphagia Diet  and Mildly Thick Thickened Liquids   Intake/Tolerance:     8/11 x1 meal   8/12 x2 meals, 75% breakfast, 50% dinner   8/13 75% breakfast     Pt meeting <75% estimated nutrition needs this admit      NEW FINDINGS   Met with pt at bedside this afternoon. Pt reports decreased appetite, wanting to gain weight, currently underweight. Pt requesting ensure clear, not appropriate as supplement cannot be thickened. Pt agreeable for trial additional nutrition supplements Gel Plus and Magic cup. Pt denies nausea vomiting or abd pain at this time.     Current weight trending up, 104 lb this AM. Last BM x2 8/11.   Food Allergy: Chocolate    MALNUTRITION  Malnutrition Diagnosis: Moderate malnutrition  In Context of:  Chronic illness or disease    INTERVENTIONS  Implementation  Medical food supplement therapy- Cherry gel plus, Berry Magic cup     Goals  Diet advancement vs nutrition support within 2-3 days.- Met   Patient to consume % of nutritionally adequate meals three times per day, or the equivalent with supplements/snacks.- Not Met, pt consuming 50-75% x2 meals/day this admit     Monitoring/Evaluation  Progress toward goals will be monitored and evaluated per protocol.

## 2024-08-13 NOTE — PLAN OF CARE
Pleasant, alert and oriented. Denied difficulty breathing. Fairly crackly on auscultation. Encouraged use of incentive spirometer. Encouraged pt. To get up to recliner but she declined. Feet and toes are red and warm but pt. Says this is normal and better than in the past when they have been gray. Good appetite. Up to the toilet with one assist, walker, and gait belt. Pt. Had a mild pinkness around the coccyx wound. Had an orange urine output before bed.

## 2024-08-13 NOTE — PLAN OF CARE
Goal Outcome Evaluation:      Plan of Care Reviewed With: patient  Patient alert and oriented.   Assist of 1 with gait belt and a walker to the bathroom and hallways.  Up in the chair for meals.   Complained of facial pain of 8/10 scheduled tylenol with codeine given.Effective per Patient.    Am BP 88/51 MD notified ordered labs and NS bolus. Recheck BP 98/51.  MD informed and ordered 2nd bolus and BP 94/50. MD aware.  K and Mag protocol  no replacement recheck in the morning.  NSR noted on telemetry.

## 2024-08-13 NOTE — PLAN OF CARE
Problem: Adult Inpatient Plan of Care  Goal: Absence of Hospital-Acquired Illness or Injury  Intervention: Prevent Skin Injury  Recent Flowsheet Documentation  Taken 8/13/2024 0021 by Walter Rey RN  Body Position: position changed independently   Goal Outcome Evaluation:       Pt denies pain, slept through the night, arouses to care by voice for care. Scheduled meds given per order. Continue to monitor.   Vital signs:  Temp: (P) 97.9  F (36.6  C) Temp src: (P) Oral BP: (P) 92/49 Pulse: (P) 71   Resp: (P) 18 SpO2: 95 % O2 Device: None (Room air)

## 2024-08-13 NOTE — PROVIDER NOTIFICATION
PROVIDER NOTIFICATION    Reason for communication:  Low BP of 88/51    Team member name: Dr Morales    Team member role:  Hosiptalist     Method of Communication:  Edith paged    Response: okay

## 2024-08-13 NOTE — PROGRESS NOTES
[facility]    Medicine Progress Note - Hospitalist Service    Date of Admission:  8/11/2024    Assessment & Plan   Julisa Chaney is a 79 year old female with PMH of trigeminal neuralgia, PUD, MDD, YUE, HLD, collagenous colitis hospitalization in May 2024, migraine headaches admitted on 8/11/2024 with:     #Aspiration pneumonia  #Sepsis, WBCs 29.0, left shift, improved  #Acute respiratory failure with hypoxia, resolved  -CT showed diffuse mucous plugging.  -Tested negative for COVID-19, influenza, RSV  -Most prominent episode of choking with tablets on 8/10.  -Suspect either silent aspiration or results of aspiration, given CT findings with mucous plugging.  -unable to collect sputum cx, nasal MRSA culture positive  -continue Vancomycin, Cefepime, Flagyl  - DuoNebs, 7% saline nebs every 4 hours while awake, I.S., Flutter valve  - RCAT  - Guaifenesin 100 mg twice daily  -HOB 30 degrees  -Aspiration precautions  - Supplemental oxygen to keep SpO2 above 90% prn  -continuous oximetry  -Mild thickened liquid diet due to aspiration on thin liquids  -SLP assistance appreciated.    #Hypotension  -improved post IVF bolus  -ECG showed improved ST changes anterolateral compared to prior ECG  -Initial troponin 76. Repeat ordered. Lactic acid normal.  -Tele  -Continue IVF  -on IV vanc/cefepime/flagyl  -notify MD for MAP < 65     #Chronic pain syndrome due to trigeminal neuralgia.  #Trigeminal neuralgia  -continue PT nortriptyline, gabapentin, Trileptal, Tylenol 3.    #History of PUD  -Continue Protonix.    #MDD, insomnia  -continue  Seroquel, amitriptyline, metoprolol.    #Question history of epilepsy  -Patient denies and says for neuropathic pain.   -on Keppra.    #History of chronic diarrhea due to collagenous colitis.    #Underweight,Body mass index is 16.64 kg/m .    #Failure to thrive in adult    #Moderate malnutrition  - PT, OT, SW, RD          Diet: Combination Diet Easy to Chew (level 7); Mildly  "Thick (level 2)  Snacks/Supplements Adult: Gelatein Plus; Between Meals  Snacks/Supplements Adult: Magic Cup; Between Meals    DVT Prophylaxis: Enoxaparin (Lovenox) SQ  Abrams Catheter: Not present  Lines: None     Cardiac Monitoring: ACTIVE order. Indication: QTc prolonging medication (48 hours)  Code Status: Full Code      Clinically Significant Risk Factors              # Hypoalbuminemia: Lowest albumin = 3 g/dL at 8/13/2024  5:48 AM, will monitor as appropriate               # Cachexia: Estimated body mass index is 16.82 kg/m  as calculated from the following:    Height as of this encounter: 1.676 m (5' 6\").    Weight as of this encounter: 47.3 kg (104 lb 3.2 oz)., PRESENT ON ADMISSION  # Moderate Malnutrition: based on nutrition assessment, PRESENT ON ADMISSION   # Financial/Environmental Concerns: none               Disposition Plan     Medically Ready for Discharge: Anticipated in 2-4 Days             Ebenezer Morales DO,   Hospitalist Service  [facility]  Securely message with garbs (more info)  Text page via [email or web address] Paging/Directory   ______________________________________________________________________    Interval History   Afebrile. No acute events overnight.    Physical Exam   Vital Signs: Temp: 97.8  F (36.6  C) Temp src: Oral BP: 94/50 Pulse: 69   Resp: 20 SpO2: 93 % O2 Device: None (Room air)    Weight: 104 lbs 3.2 oz    General appearance - alert, and in no distress  Eyes - pupils equal and reactive, extraocular eye movements intact, sclera anicteric  Lungs - crackles rll/rml, LLL. No wheezing, non labored  Heart - normal rate, regular rhythm, normal S1, S2, no murmurs, rubs, clicks or gallops. No peripheral edema.  Abdomen - soft, nontender, nondistended, BS+  Neurological - alert, oriented, normal speech, no focal findings or movement disorder noted  Skin - no c/c/p     Lab/imaging reviewed  "

## 2024-08-14 ENCOUNTER — APPOINTMENT (OUTPATIENT)
Dept: SPEECH THERAPY | Facility: HOSPITAL | Age: 79
DRG: 871 | End: 2024-08-14
Payer: COMMERCIAL

## 2024-08-14 ENCOUNTER — APPOINTMENT (OUTPATIENT)
Dept: PHYSICAL THERAPY | Facility: HOSPITAL | Age: 79
DRG: 871 | End: 2024-08-14
Payer: COMMERCIAL

## 2024-08-14 LAB
ANION GAP SERPL CALCULATED.3IONS-SCNC: 7 MMOL/L (ref 7–15)
BASOPHILS # BLD AUTO: 0 10E3/UL (ref 0–0.2)
BASOPHILS NFR BLD AUTO: 0 %
BUN SERPL-MCNC: 10.7 MG/DL (ref 8–23)
CALCIUM SERPL-MCNC: 8.3 MG/DL (ref 8.8–10.4)
CHLORIDE SERPL-SCNC: 111 MMOL/L (ref 98–107)
CREAT SERPL-MCNC: 0.63 MG/DL (ref 0.51–0.95)
EGFRCR SERPLBLD CKD-EPI 2021: 90 ML/MIN/1.73M2
EOSINOPHIL # BLD AUTO: 0.4 10E3/UL (ref 0–0.7)
EOSINOPHIL NFR BLD AUTO: 5 %
ERYTHROCYTE [DISTWIDTH] IN BLOOD BY AUTOMATED COUNT: 15.2 % (ref 10–15)
GLUCOSE SERPL-MCNC: 92 MG/DL (ref 70–99)
HCO3 SERPL-SCNC: 26 MMOL/L (ref 22–29)
HCT VFR BLD AUTO: 36.3 % (ref 35–47)
HGB BLD-MCNC: 10.9 G/DL (ref 11.7–15.7)
IMM GRANULOCYTES # BLD: 0 10E3/UL
IMM GRANULOCYTES NFR BLD: 0 %
LYMPHOCYTES # BLD AUTO: 1.1 10E3/UL (ref 0.8–5.3)
LYMPHOCYTES NFR BLD AUTO: 16 %
MAGNESIUM SERPL-MCNC: 1.8 MG/DL (ref 1.7–2.3)
MCH RBC QN AUTO: 26.1 PG (ref 26.5–33)
MCHC RBC AUTO-ENTMCNC: 30 G/DL (ref 31.5–36.5)
MCV RBC AUTO: 87 FL (ref 78–100)
MONOCYTES # BLD AUTO: 0.6 10E3/UL (ref 0–1.3)
MONOCYTES NFR BLD AUTO: 9 %
NEUTROPHILS # BLD AUTO: 4.8 10E3/UL (ref 1.6–8.3)
NEUTROPHILS NFR BLD AUTO: 69 %
NRBC # BLD AUTO: 0 10E3/UL
NRBC BLD AUTO-RTO: 0 /100
PLATELET # BLD AUTO: 290 10E3/UL (ref 150–450)
POTASSIUM SERPL-SCNC: 4.1 MMOL/L (ref 3.4–5.3)
RBC # BLD AUTO: 4.17 10E6/UL (ref 3.8–5.2)
SODIUM SERPL-SCNC: 144 MMOL/L (ref 135–145)
VANCOMYCIN SERPL-MCNC: 12.1 UG/ML
WBC # BLD AUTO: 6.9 10E3/UL (ref 4–11)

## 2024-08-14 PROCEDURE — 94640 AIRWAY INHALATION TREATMENT: CPT

## 2024-08-14 PROCEDURE — 258N000003 HC RX IP 258 OP 636: Performed by: INTERNAL MEDICINE

## 2024-08-14 PROCEDURE — 99232 SBSQ HOSP IP/OBS MODERATE 35: CPT | Performed by: INTERNAL MEDICINE

## 2024-08-14 PROCEDURE — 120N000001 HC R&B MED SURG/OB

## 2024-08-14 PROCEDURE — 250N000011 HC RX IP 250 OP 636: Performed by: INTERNAL MEDICINE

## 2024-08-14 PROCEDURE — 80048 BASIC METABOLIC PNL TOTAL CA: CPT | Performed by: INTERNAL MEDICINE

## 2024-08-14 PROCEDURE — 85025 COMPLETE CBC W/AUTO DIFF WBC: CPT | Performed by: INTERNAL MEDICINE

## 2024-08-14 PROCEDURE — 97550 CAREGIVER TRAING 1ST 30 MIN: CPT | Mod: GN

## 2024-08-14 PROCEDURE — 94799 UNLISTED PULMONARY SVC/PX: CPT

## 2024-08-14 PROCEDURE — 250N000013 HC RX MED GY IP 250 OP 250 PS 637: Performed by: INTERNAL MEDICINE

## 2024-08-14 PROCEDURE — 94640 AIRWAY INHALATION TREATMENT: CPT | Mod: 76

## 2024-08-14 PROCEDURE — 97542 WHEELCHAIR MNGMENT TRAINING: CPT | Mod: GP

## 2024-08-14 PROCEDURE — 97530 THERAPEUTIC ACTIVITIES: CPT | Mod: GP

## 2024-08-14 PROCEDURE — 250N000009 HC RX 250: Performed by: INTERNAL MEDICINE

## 2024-08-14 PROCEDURE — 999N000157 HC STATISTIC RCP TIME EA 10 MIN

## 2024-08-14 PROCEDURE — 36415 COLL VENOUS BLD VENIPUNCTURE: CPT | Performed by: INTERNAL MEDICINE

## 2024-08-14 PROCEDURE — 97116 GAIT TRAINING THERAPY: CPT | Mod: GP

## 2024-08-14 PROCEDURE — 83735 ASSAY OF MAGNESIUM: CPT | Performed by: INTERNAL MEDICINE

## 2024-08-14 PROCEDURE — 80202 ASSAY OF VANCOMYCIN: CPT | Performed by: INTERNAL MEDICINE

## 2024-08-14 RX ADMIN — Medication 50 MCG: at 09:12

## 2024-08-14 RX ADMIN — Medication 4 ML: at 20:16

## 2024-08-14 RX ADMIN — IPRATROPIUM BROMIDE AND ALBUTEROL SULFATE 3 ML: .5; 3 SOLUTION RESPIRATORY (INHALATION) at 16:50

## 2024-08-14 RX ADMIN — POTASSIUM CHLORIDE 20 MEQ: 1500 TABLET, EXTENDED RELEASE ORAL at 09:12

## 2024-08-14 RX ADMIN — CEFEPIME HYDROCHLORIDE 2 G: 2 INJECTION, POWDER, FOR SOLUTION INTRAVENOUS at 18:19

## 2024-08-14 RX ADMIN — GABAPENTIN 100 MG: 100 CAPSULE ORAL at 09:14

## 2024-08-14 RX ADMIN — OXCARBAZEPINE 450 MG: 150 TABLET, FILM COATED ORAL at 20:39

## 2024-08-14 RX ADMIN — ACETAMINOPHEN AND CODEINE PHOSPHATE 1 TABLET: 300; 30 TABLET ORAL at 20:37

## 2024-08-14 RX ADMIN — GUAIFENESIN 1200 MG: 600 TABLET ORAL at 20:37

## 2024-08-14 RX ADMIN — CEFEPIME HYDROCHLORIDE 2 G: 2 INJECTION, POWDER, FOR SOLUTION INTRAVENOUS at 11:26

## 2024-08-14 RX ADMIN — LEVETIRACETAM 750 MG: 500 TABLET, FILM COATED ORAL at 09:13

## 2024-08-14 RX ADMIN — ACETAMINOPHEN AND CODEINE PHOSPHATE 1 TABLET: 300; 30 TABLET ORAL at 09:13

## 2024-08-14 RX ADMIN — VANCOMYCIN HYDROCHLORIDE 1000 MG: 1 INJECTION, SOLUTION INTRAVENOUS at 09:31

## 2024-08-14 RX ADMIN — Medication 4 ML: at 12:15

## 2024-08-14 RX ADMIN — NORTRIPTYLINE HYDROCHLORIDE 50 MG: 50 CAPSULE ORAL at 20:38

## 2024-08-14 RX ADMIN — LEVETIRACETAM 750 MG: 500 TABLET, FILM COATED ORAL at 20:37

## 2024-08-14 RX ADMIN — ENOXAPARIN SODIUM 30 MG: 30 INJECTION SUBCUTANEOUS at 20:38

## 2024-08-14 RX ADMIN — IPRATROPIUM BROMIDE AND ALBUTEROL SULFATE 3 ML: .5; 3 SOLUTION RESPIRATORY (INHALATION) at 20:15

## 2024-08-14 RX ADMIN — GUAIFENESIN 1200 MG: 600 TABLET ORAL at 09:14

## 2024-08-14 RX ADMIN — CEFEPIME HYDROCHLORIDE 2 G: 2 INJECTION, POWDER, FOR SOLUTION INTRAVENOUS at 03:48

## 2024-08-14 RX ADMIN — IPRATROPIUM BROMIDE AND ALBUTEROL SULFATE 3 ML: .5; 3 SOLUTION RESPIRATORY (INHALATION) at 12:15

## 2024-08-14 RX ADMIN — MIRTAZAPINE 15 MG: 7.5 TABLET, FILM COATED ORAL at 20:38

## 2024-08-14 RX ADMIN — Medication 4 ML: at 16:50

## 2024-08-14 RX ADMIN — PSYLLIUM HUSK 1 PACKET: 3.4 POWDER ORAL at 09:14

## 2024-08-14 RX ADMIN — PANTOPRAZOLE SODIUM 40 MG: 20 TABLET, DELAYED RELEASE ORAL at 07:13

## 2024-08-14 RX ADMIN — GABAPENTIN 300 MG: 300 CAPSULE ORAL at 20:44

## 2024-08-14 RX ADMIN — ACETAMINOPHEN AND CODEINE PHOSPHATE 1 TABLET: 300; 30 TABLET ORAL at 03:50

## 2024-08-14 RX ADMIN — FERROUS SULFATE TAB 325 MG (65 MG ELEMENTAL FE) 325 MG: 325 (65 FE) TAB at 09:13

## 2024-08-14 RX ADMIN — QUETIAPINE FUMARATE 50 MG: 25 TABLET ORAL at 21:38

## 2024-08-14 RX ADMIN — ACETAMINOPHEN AND CODEINE PHOSPHATE 1 TABLET: 300; 30 TABLET ORAL at 16:55

## 2024-08-14 RX ADMIN — SODIUM CHLORIDE: 9 INJECTION, SOLUTION INTRAVENOUS at 04:51

## 2024-08-14 RX ADMIN — METRONIDAZOLE 500 MG: 5 INJECTION, SOLUTION INTRAVENOUS at 12:12

## 2024-08-14 RX ADMIN — TOPIRAMATE 50 MG: 25 TABLET, FILM COATED ORAL at 21:38

## 2024-08-14 ASSESSMENT — ACTIVITIES OF DAILY LIVING (ADL)
ADLS_ACUITY_SCORE: 44
ADLS_ACUITY_SCORE: 46
ADLS_ACUITY_SCORE: 44
ADLS_ACUITY_SCORE: 46
ADLS_ACUITY_SCORE: 44

## 2024-08-14 NOTE — PLAN OF CARE
"Problem: Pneumonia  Goal: Fluid Balance  Outcome: Progressing  Goal: Resolution of Infection Signs and Symptoms  Outcome: Progressing  Goal: Effective Oxygenation and Ventilation  Outcome: Progressing   Goal Outcome Evaluation:    Patient is alert and oriented and able to make needs known.  Slept through the night.  Patient is on tele- NSR with prolonged Qtc's.      BP 94/48 (BP Location: Right arm)   Pulse 75   Temp 97.5  F (36.4  C) (Oral)   Resp 16   Ht 1.676 m (5' 6\")   Wt 47.3 kg (104 lb 3.2 oz)   SpO2 91%   BMI 16.82 kg/m      April N ABRIL Kulkarni        "

## 2024-08-14 NOTE — PROGRESS NOTES
Care Management Follow Up    Length of Stay (days): 3    Expected Discharge Date: 08/14/2024     Concerns to be Addressed: discharge planning     Patient plan of care discussed at interdisciplinary rounds: Yes    Anticipated Discharge Disposition:  TBD     Anticipated Discharge Services:  TBD  Anticipated Discharge DME:      Patient/family educated on Medicare website which has current facility and service quality ratings:    Education Provided on the Discharge Plan:    Patient/Family in Agreement with the Plan:      Referrals Placed by CM/SW:    Private pay costs discussed: Not applicable    Additional Information:  Per provider, patient and daughter would prefer TCU discharge.  When CM met with patient 2 days ago, patient wanted to go home.  PT/OT recommendation is home with assist or home with home care.  Patient only walking 12', 30', 30'.  CM left message for PT requesting PT to assess for TCU appropriateness.     Lizzette Lee RN

## 2024-08-14 NOTE — PHARMACY-VANCOMYCIN DOSING SERVICE
"Pharmacy Vancomycin Note  Date of Service 2024  Patient's  1945   79 year old, female    Indication: Aspiration Pneumonia  Day of Therapy: 3  Current vancomycin regimen:  1000 mg IV q24h  Current vancomycin monitoring method: AUC  Current vancomycin therapeutic monitoring goal: 400-600 mg*h/L    InsightRX Prediction of Current Vancomycin Regimen  Loading dose: N/A  Regimen: 1000 mg IV every 24 hours.  Start time: 13:13 on 2024  Exposure target: AUC24 (range)400-600 mg/L.hr   AUC24,ss: 433 mg/L.hr  Probability of AUC24 > 400: 71 %  Ctrough,ss: 11.9 mg/L  Probability of Ctrough,ss > 20: 1 %  Probability of nephrotoxicity (Lodise ESTIVEN ): 7 %      Current estimated CrCl = Estimated Creatinine Clearance: 57 mL/min (based on SCr of 0.63 mg/dL).    Creatinine for last 3 days  2024: 12:30 PM Creatinine 0.53 mg/dL  2024:  6:36 AM Creatinine 0.62 mg/dL  2024:  5:48 AM Creatinine 0.61 mg/dL  2024:  6:43 AM Creatinine 0.63 mg/dL    Recent Vancomycin Levels (past 3 days)  2024:  6:43 AM Vancomycin 12.1 ug/mL    Vancomycin IV Administrations (past 72 hours)                     vancomycin (VANCOCIN) 1,000 mg in 200 mL dextrose intermittent infusion (mg) 1,000 mg New Bag 24 1313    vancomycin (VANCOCIN) 1,250 mg in sodium chloride 0.9 % 250 mL intermittent infusion (mg) 1,250 mg New Bag 24 1232                    Nephrotoxins and other renal medications (From now, onward)      Start     Dose/Rate Route Frequency Ordered Stop    24 1200  vancomycin (VANCOCIN) 1,000 mg in 200 mL dextrose intermittent infusion        Placed in \"Followed by\" Linked Group    1,000 mg  200 mL/hr over 1 Hours Intravenous EVERY 24 HOURS 24 1020                 Contrast Orders - past 72 hours (72h ago, onward)      Start     Dose/Rate Route Frequency Stop    24 1400  barium sulfate (VARIBAR THIN Liquid) 40 % oral suspension 24 g         60 mL Oral ONCE 24 1355    " 08/12/24 1400  barium sulfate (VARIBAR) 40 % pudding/paste 20 mL         20 mL Oral ONCE 08/12/24 1355    08/12/24 1400  barium sulfate 40% (VARIBAR NECTAR) oral suspension          Oral ONCE 08/12/24 1355            Interpretation of levels and current regimen:  Vancomycin level is reflective of -600    Has serum creatinine changed greater than 50% in last 72 hours: No    Urine output:  unable to determine    Renal Function: Stable    Plan:  Continue Current Dose, will move dose up to be given at 0900  Vancomycin monitoring method: AUC  Vancomycin therapeutic monitoring goal: 400-600 mg*h/L  Pharmacy will check vancomycin levels as appropriate in 1-3 Days.  Serum creatinine levels will be ordered daily for the first week of therapy and at least twice weekly for subsequent weeks.    DAJA DAVILA RPH

## 2024-08-14 NOTE — PROGRESS NOTES
"CLINICAL NUTRITION SERVICES - BRIEF NOTE    EVALUATION OF THE PROGRESS TOWARD GOALS   Diet: Level 7: Easy to Chew Dysphagia Diet  and Mildly Thick  Thickened Liquids   Nutrition Supplement: Gel Plus daily, Magic Cup daily   Food Allergy: Chocolate   Intake:     8/13 75% breakfast and dinner + gel plus(60 ml) and magic cup(120 ml)      Met with pt at bedside this AM. Pt finishing up breakfast during visit. Pt reports good po at dinner 8/13, consuming 75%. Pt states good tolerance of nutrition supplements cherry gel plus and berry magic cup, agreeable to continue.     BM: x1 this AM  Height: 167.6 cm (5' 6\")  Most Recent Weight: 49.9 kg (110 lb 0.2 oz)    Weight History: Current weight trending up  this admit, question accuracy of bed weight   08/14/24 0700 49.9 kg (110 lb 0.2 oz) Bed scale   08/13/24 1003 47.3 kg (104 lb 3.2 oz) Standing scale   08/11/24 1031 45.4 kg (100 lb)      MALNUTRITION  Malnutrition Diagnosis: Moderate malnutrition  In Context of:  Chronic illness or disease    INTERVENTIONS  Implementation  Medical food supplement therapy- Cherry gel plus, Berry Magic cup   Provided Easy to Chew Menu     Goals  Patient to consume % of nutritionally adequate meals three times per day, or the equivalent with supplements/snacks.- Progressing, pt consuming 75% x 2 meals 8/13 = supplements     Monitoring/Evaluation  Progress toward goals will be monitored and evaluated per protocol.   "

## 2024-08-14 NOTE — PLAN OF CARE
Problem: Swallowing Impairment  Goal: Optimal Eating and Swallowing Without Aspiration  Outcome: Progressing  Up in the chair for meals.   Patient eating and taking medication with no swallow issue.  Tylenol #3 scheduled for facial pain rated 9/10 with good effect.       Problem: Pneumonia  Goal: Fluid Balance  Outcome: Progressing   Goal Outcome Evaluation:      Plan of Care Reviewed With: patient  Sating 93-94% RA. No cough noted or report.  Patient afebrile.   Telemetry NSR.

## 2024-08-14 NOTE — PLAN OF CARE
Goal Outcome Evaluation:    Problem: Adult Inpatient Plan of Care  Goal: Optimal Comfort and Wellbeing  Outcome: Progressing  Patient c/o L facial pain from her trigeminal neuralgia, scheduled Tylenol #3 effective for pain relief along with other scheduled meds.      Problem: Pneumonia  Goal: Effective Oxygenation and Ventilation  Outcome: Progressing  O2 sats stable on RA.    Guanakito Meier RN

## 2024-08-14 NOTE — PROGRESS NOTES
[facility]    Medicine Progress Note - Hospitalist Service    Date of Admission:  8/11/2024    Assessment & Plan   Julisa Chaney is a 79 year old female with PMH of trigeminal neuralgia, PUD, MDD, YUE, HLD, collagenous colitis hospitalization in May 2024, migraine headaches admitted on 8/11/2024 with:     #Aspiration pneumonia  #Sepsis  #Acute respiratory failure with hypoxia, resolved  -CT showed diffuse mucous plugging.  -Tested negative for COVID-19, influenza, RSV  -Most prominent episode of choking with tablets on 8/10.  -Suspect either silent aspiration or results of aspiration, given CT findings with mucous plugging.  -Leukocytosis resolved  -unable to collect sputum cx, nasal MRSA culture positive  -continue Vancomycin, Cefepime, Flagyl  -plan change to oral antibiotics in a.m.  - DuoNebs, 7% saline nebs every 4 hours while awake, I.S., Flutter valve  - RCAT  - Guaifenesin 100 mg twice daily  -HOB 30 degrees  -Aspiration precautions  - Supplemental oxygen to keep SpO2 above 90% prn  -continuous oximetry  -Mild thickened liquid diet due to aspiration on thin liquids  -SLP assistance appreciated.    #Hypotension, resolved  -no anginal complaints  -improved post IVF   -ECG showed improved ST changes anterolateral compared to prior ECG  -Initial troponin 76 -> 68Lactic acid normal.  -Tele  -SL IVF  -on IV vanc/cefepime/flagyl  -notify MD for MAP < 65     #Chronic pain syndrome due to trigeminal neuralgia.  #Trigeminal neuralgia  -continue PT nortriptyline, gabapentin, Trileptal, Tylenol 3.    #History of PUD  -Continue Protonix.    #MDD, insomnia  -continue  Seroquel, amitriptyline, metoprolol.    #Question history of epilepsy  -Patient denies and says for neuropathic pain.   -on Keppra.    #History of chronic diarrhea due to collagenous colitis.    #Underweight,Body mass index is 16.64 kg/m .    #Failure to thrive in adult    #Moderate malnutrition  - PT, OT, SW, RD          Diet:  "Combination Diet Easy to Chew (level 7); Mildly Thick (level 2)  Snacks/Supplements Adult: Gelatein Plus; Between Meals  Snacks/Supplements Adult: Magic Cup; Between Meals    DVT Prophylaxis: Enoxaparin (Lovenox) SQ  Abrams Catheter: Not present  Lines: None     Cardiac Monitoring: ACTIVE order. Indication: QTc prolonging medication (48 hours)  Code Status: Full Code      Clinically Significant Risk Factors              # Hypoalbuminemia: Lowest albumin = 3 g/dL at 8/13/2024  5:48 AM, will monitor as appropriate               # Cachexia: Estimated body mass index is 17.76 kg/m  as calculated from the following:    Height as of this encounter: 1.676 m (5' 6\").    Weight as of this encounter: 49.9 kg (110 lb 0.2 oz)., PRESENT ON ADMISSION  # Moderate Malnutrition: based on nutrition assessment, PRESENT ON ADMISSION   # Financial/Environmental Concerns: none               Disposition Plan     Medically Ready for Discharge: 1-2d             Ebenezer Morales DO, DO  Hospitalist Service  [facility]  Securely message with [employer] (more info)  Text page via [employer] Paging/Directory   ______________________________________________________________________    Interval History     Afebrile. No acute events overnight.     Physical Exam   Vital Signs: Temp: 97.8  F (36.6  C) Temp src: Oral BP: 100/54 Pulse: 76   Resp: 17 SpO2: 93 % O2 Device: None (Room air)    Weight: 110 lbs .15 oz    General appearance - alert, and in no distress  Eyes - sclera anicteric  Lungs - crackles rll/rml, LLL improving slowly. No wheezing, non labored  Heart - rrr. No peripheral edema.  Abdomen - soft, nontender, nondistended, BS+  Neurological - alert, oriented, normal speech, no focal findings or movement disorder noted  Skin - no c/c/p     Lab/imaging reviewed  "

## 2024-08-15 ENCOUNTER — APPOINTMENT (OUTPATIENT)
Dept: SPEECH THERAPY | Facility: HOSPITAL | Age: 79
DRG: 871 | End: 2024-08-15
Payer: COMMERCIAL

## 2024-08-15 ENCOUNTER — APPOINTMENT (OUTPATIENT)
Dept: PHYSICAL THERAPY | Facility: HOSPITAL | Age: 79
DRG: 871 | End: 2024-08-15
Payer: COMMERCIAL

## 2024-08-15 LAB
ALBUMIN SERPL BCG-MCNC: 2.8 G/DL (ref 3.5–5.2)
ALP SERPL-CCNC: 97 U/L (ref 40–150)
ALT SERPL W P-5'-P-CCNC: 12 U/L (ref 0–50)
ANION GAP SERPL CALCULATED.3IONS-SCNC: 8 MMOL/L (ref 7–15)
AST SERPL W P-5'-P-CCNC: 9 U/L (ref 0–45)
BASOPHILS # BLD AUTO: 0 10E3/UL (ref 0–0.2)
BASOPHILS NFR BLD AUTO: 1 %
BILIRUB SERPL-MCNC: 0.2 MG/DL
BUN SERPL-MCNC: 9.4 MG/DL (ref 8–23)
CALCIUM SERPL-MCNC: 8.4 MG/DL (ref 8.8–10.4)
CHLORIDE SERPL-SCNC: 111 MMOL/L (ref 98–107)
CREAT SERPL-MCNC: 0.61 MG/DL (ref 0.51–0.95)
EGFRCR SERPLBLD CKD-EPI 2021: 90 ML/MIN/1.73M2
EOSINOPHIL # BLD AUTO: 0.4 10E3/UL (ref 0–0.7)
EOSINOPHIL NFR BLD AUTO: 5 %
ERYTHROCYTE [DISTWIDTH] IN BLOOD BY AUTOMATED COUNT: 15.1 % (ref 10–15)
GLUCOSE SERPL-MCNC: 84 MG/DL (ref 70–99)
HCO3 SERPL-SCNC: 27 MMOL/L (ref 22–29)
HCT VFR BLD AUTO: 38.7 % (ref 35–47)
HGB BLD-MCNC: 11.6 G/DL (ref 11.7–15.7)
IMM GRANULOCYTES # BLD: 0 10E3/UL
IMM GRANULOCYTES NFR BLD: 1 %
LYMPHOCYTES # BLD AUTO: 1.2 10E3/UL (ref 0.8–5.3)
LYMPHOCYTES NFR BLD AUTO: 16 %
MAGNESIUM SERPL-MCNC: 1.8 MG/DL (ref 1.7–2.3)
MCH RBC QN AUTO: 26.4 PG (ref 26.5–33)
MCHC RBC AUTO-ENTMCNC: 30 G/DL (ref 31.5–36.5)
MCV RBC AUTO: 88 FL (ref 78–100)
MONOCYTES # BLD AUTO: 0.7 10E3/UL (ref 0–1.3)
MONOCYTES NFR BLD AUTO: 10 %
NEUTROPHILS # BLD AUTO: 5 10E3/UL (ref 1.6–8.3)
NEUTROPHILS NFR BLD AUTO: 67 %
NRBC # BLD AUTO: 0 10E3/UL
NRBC BLD AUTO-RTO: 0 /100
PLATELET # BLD AUTO: 319 10E3/UL (ref 150–450)
POTASSIUM SERPL-SCNC: 3.6 MMOL/L (ref 3.4–5.3)
PROT SERPL-MCNC: 5.7 G/DL (ref 6.4–8.3)
RBC # BLD AUTO: 4.39 10E6/UL (ref 3.8–5.2)
SODIUM SERPL-SCNC: 146 MMOL/L (ref 135–145)
WBC # BLD AUTO: 7.4 10E3/UL (ref 4–11)

## 2024-08-15 PROCEDURE — 250N000009 HC RX 250: Performed by: INTERNAL MEDICINE

## 2024-08-15 PROCEDURE — 94640 AIRWAY INHALATION TREATMENT: CPT

## 2024-08-15 PROCEDURE — 97530 THERAPEUTIC ACTIVITIES: CPT | Mod: GP

## 2024-08-15 PROCEDURE — 250N000011 HC RX IP 250 OP 636: Performed by: INTERNAL MEDICINE

## 2024-08-15 PROCEDURE — 99232 SBSQ HOSP IP/OBS MODERATE 35: CPT | Performed by: INTERNAL MEDICINE

## 2024-08-15 PROCEDURE — 94640 AIRWAY INHALATION TREATMENT: CPT | Mod: 76

## 2024-08-15 PROCEDURE — 250N000013 HC RX MED GY IP 250 OP 250 PS 637: Performed by: INTERNAL MEDICINE

## 2024-08-15 PROCEDURE — 97116 GAIT TRAINING THERAPY: CPT | Mod: GP

## 2024-08-15 PROCEDURE — 999N000156 HC STATISTIC RCP CONSULT EA 30 MIN

## 2024-08-15 PROCEDURE — 250N000013 HC RX MED GY IP 250 OP 250 PS 637: Performed by: STUDENT IN AN ORGANIZED HEALTH CARE EDUCATION/TRAINING PROGRAM

## 2024-08-15 PROCEDURE — 85004 AUTOMATED DIFF WBC COUNT: CPT | Performed by: INTERNAL MEDICINE

## 2024-08-15 PROCEDURE — 36415 COLL VENOUS BLD VENIPUNCTURE: CPT | Performed by: INTERNAL MEDICINE

## 2024-08-15 PROCEDURE — 120N000001 HC R&B MED SURG/OB

## 2024-08-15 PROCEDURE — 999N000157 HC STATISTIC RCP TIME EA 10 MIN

## 2024-08-15 PROCEDURE — 92526 ORAL FUNCTION THERAPY: CPT | Mod: GN

## 2024-08-15 PROCEDURE — 83735 ASSAY OF MAGNESIUM: CPT | Performed by: INTERNAL MEDICINE

## 2024-08-15 PROCEDURE — 80053 COMPREHEN METABOLIC PANEL: CPT | Performed by: INTERNAL MEDICINE

## 2024-08-15 RX ORDER — LINEZOLID 600 MG/1
600 TABLET, FILM COATED ORAL EVERY 12 HOURS SCHEDULED
Status: DISCONTINUED | OUTPATIENT
Start: 2024-08-15 | End: 2024-08-16

## 2024-08-15 RX ORDER — CEFEPIME HYDROCHLORIDE 2 G/1
2 INJECTION, POWDER, FOR SOLUTION INTRAVENOUS EVERY 12 HOURS
Status: DISCONTINUED | OUTPATIENT
Start: 2024-08-15 | End: 2024-08-15

## 2024-08-15 RX ORDER — IPRATROPIUM BROMIDE AND ALBUTEROL SULFATE 2.5; .5 MG/3ML; MG/3ML
3 SOLUTION RESPIRATORY (INHALATION) EVERY 6 HOURS PRN
Status: DISCONTINUED | OUTPATIENT
Start: 2024-08-15 | End: 2024-08-20 | Stop reason: HOSPADM

## 2024-08-15 RX ADMIN — FERROUS SULFATE TAB 325 MG (65 MG ELEMENTAL FE) 325 MG: 325 (65 FE) TAB at 09:05

## 2024-08-15 RX ADMIN — IPRATROPIUM BROMIDE AND ALBUTEROL SULFATE 3 ML: .5; 3 SOLUTION RESPIRATORY (INHALATION) at 13:05

## 2024-08-15 RX ADMIN — Medication 4 ML: at 07:41

## 2024-08-15 RX ADMIN — PSYLLIUM HUSK 1 PACKET: 3.4 POWDER ORAL at 09:08

## 2024-08-15 RX ADMIN — IPRATROPIUM BROMIDE AND ALBUTEROL SULFATE 3 ML: .5; 3 SOLUTION RESPIRATORY (INHALATION) at 07:37

## 2024-08-15 RX ADMIN — TOPIRAMATE 50 MG: 25 TABLET, FILM COATED ORAL at 21:50

## 2024-08-15 RX ADMIN — GUAIFENESIN 1200 MG: 600 TABLET ORAL at 20:42

## 2024-08-15 RX ADMIN — POTASSIUM CHLORIDE 20 MEQ: 1500 TABLET, EXTENDED RELEASE ORAL at 09:06

## 2024-08-15 RX ADMIN — OXYCODONE HYDROCHLORIDE 2.5 MG: 5 TABLET ORAL at 20:43

## 2024-08-15 RX ADMIN — GUAIFENESIN 1200 MG: 600 TABLET ORAL at 09:06

## 2024-08-15 RX ADMIN — QUETIAPINE FUMARATE 50 MG: 25 TABLET ORAL at 21:50

## 2024-08-15 RX ADMIN — LINEZOLID 600 MG: 600 TABLET, FILM COATED ORAL at 20:42

## 2024-08-15 RX ADMIN — AMOXICILLIN AND CLAVULANATE POTASSIUM 1 TABLET: 875; 125 TABLET, FILM COATED ORAL at 20:43

## 2024-08-15 RX ADMIN — ACETAMINOPHEN AND CODEINE PHOSPHATE 1 TABLET: 300; 30 TABLET ORAL at 04:05

## 2024-08-15 RX ADMIN — LEVETIRACETAM 750 MG: 500 TABLET, FILM COATED ORAL at 09:05

## 2024-08-15 RX ADMIN — Medication 50 MCG: at 09:03

## 2024-08-15 RX ADMIN — CEFEPIME HYDROCHLORIDE 2 G: 2 INJECTION, POWDER, FOR SOLUTION INTRAVENOUS at 04:06

## 2024-08-15 RX ADMIN — PANTOPRAZOLE SODIUM 40 MG: 20 TABLET, DELAYED RELEASE ORAL at 06:44

## 2024-08-15 RX ADMIN — GABAPENTIN 300 MG: 300 CAPSULE ORAL at 20:43

## 2024-08-15 RX ADMIN — METRONIDAZOLE 500 MG: 5 INJECTION, SOLUTION INTRAVENOUS at 11:07

## 2024-08-15 RX ADMIN — ACETAMINOPHEN 650 MG: 325 TABLET ORAL at 20:43

## 2024-08-15 RX ADMIN — METRONIDAZOLE 500 MG: 5 INJECTION, SOLUTION INTRAVENOUS at 00:56

## 2024-08-15 RX ADMIN — VANCOMYCIN HYDROCHLORIDE 1000 MG: 1 INJECTION, SOLUTION INTRAVENOUS at 09:15

## 2024-08-15 RX ADMIN — LEVETIRACETAM 750 MG: 500 TABLET, FILM COATED ORAL at 20:42

## 2024-08-15 RX ADMIN — ENOXAPARIN SODIUM 30 MG: 30 INJECTION SUBCUTANEOUS at 20:06

## 2024-08-15 RX ADMIN — ACETAMINOPHEN AND CODEINE PHOSPHATE 1 TABLET: 300; 30 TABLET ORAL at 09:06

## 2024-08-15 RX ADMIN — OXCARBAZEPINE 450 MG: 150 TABLET, FILM COATED ORAL at 20:43

## 2024-08-15 RX ADMIN — GABAPENTIN 100 MG: 100 CAPSULE ORAL at 09:04

## 2024-08-15 ASSESSMENT — ACTIVITIES OF DAILY LIVING (ADL)
ADLS_ACUITY_SCORE: 44
ADLS_ACUITY_SCORE: 40
ADLS_ACUITY_SCORE: 44
ADLS_ACUITY_SCORE: 40
ADLS_ACUITY_SCORE: 44
ADLS_ACUITY_SCORE: 40
ADLS_ACUITY_SCORE: 44
ADLS_ACUITY_SCORE: 40
ADLS_ACUITY_SCORE: 44
ADLS_ACUITY_SCORE: 40
ADLS_ACUITY_SCORE: 46
ADLS_ACUITY_SCORE: 40
ADLS_ACUITY_SCORE: 44
ADLS_ACUITY_SCORE: 46
ADLS_ACUITY_SCORE: 46
ADLS_ACUITY_SCORE: 44

## 2024-08-15 NOTE — PLAN OF CARE
Problem: Pneumonia  Goal: Fluid Balance  8/15/2024 1819 by Juan Jose Adhikari RN  Outcome: Progressing  8/15/2024 1819 by Juan Jose Adhikari RN  Reactivated   Goal Outcome Evaluation:       Pt alert and oriented x4. She denies shortness of breath. Oxygen drops to 89% on RA. On oxygen at 1 LPM va nasal cannula. Lung sounds coarse bilaterally. Afebrile. [Pain manageable with prn oxycodone and tylenol.

## 2024-08-15 NOTE — PROGRESS NOTES
Speech Language Therapy Discharge Summary    Reason for therapy discharge:    All goals and outcomes met, no further needs identified.    Progress towards therapy goal(s). See goals on Care Plan in [facility] electronic health record for goal details.  Goals met    Therapy recommendation(s):    No further therapy is recommended.Continue current diet of ETC and Mildly thick liquids with strategies of small bites/sips, slow rate, upright posture, and alternate consistencies. VFSS showed trace aspiration, trickle down with thin liquids. Medications crushed in puree. Recommend repeat VFSS in 4-6 weeks to assess for thin liquid upgrade.

## 2024-08-15 NOTE — TREATMENT PLAN
RCAT Treatment Plan    Patient Score: 6  Patient Acuity: 4    Clinical Indication for Therapy: aspiration pneumonia    Therapy Ordered: Duoneb Q6 PRN    Assessment Summary: pt here with aspiration pneumonia, hypoxia. She is a former smoker. CT 8/11 mucus plugging on right. RR 16-18, LS clear/diminished, NPC, on 1L NC SpO2 95%. Will follow as needed.      Kamar Castrejon, RT  8/15/2024

## 2024-08-15 NOTE — PROGRESS NOTES
[facility]    Medicine Progress Note - Hospitalist Service    Date of Admission:  8/11/2024    Assessment & Plan   Julisa Chaney is a 79 year old female with PMH of trigeminal neuralgia, PUD, MDD, YUE, HLD, collagenous colitis hospitalization in May 2024, migraine headaches admitted on 8/11/2024 with:     #Aspiration pneumonia  #Sepsis  #Acute respiratory failure with hypoxia, resolved  -CT showed diffuse mucous plugging.  -Tested negative for COVID-19, influenza, RSV  -Most prominent episode of choking with tablets on 8/10.  -Suspect either silent aspiration or results of aspiration, given CT findings with mucous plugging.  -Leukocytosis resolved  -unable to collect sputum cx, nasal MRSA culture positive  -stop Vancomycin, Cefepime, Flagyl  -start Linezolid, Augmentin  - stop DuoNebs, 7% saline nebs every 4 hours while awake, I.S., Flutter valve  - RCAT  - Guaifenesin 100 mg twice daily  -HOB 30 degrees  -Aspiration precautions  - Supplemental oxygen to keep SpO2 above 90% prn  -continuous oximetry  -Mild thickened liquid diet due to aspiration on thin liquids  -SLP assistance appreciated.    #Hypotension, resolved  -no anginal complaints  -improved post IVF   -ECG showed improved ST changes anterolateral compared to prior ECG  -Initial troponin 76 -> 68Lactic acid normal.  -Tele  -notify MD for MAP < 65     #Chronic pain syndrome due to trigeminal neuralgia.  #Trigeminal neuralgia  #Possible seizure disorder  -Hold nortriptyline, Tylenol 3 while on linezolid  -gabapentin, Trileptal, Keppra, Topamax    #History of PUD  -Continue Protonix.    #MDD, insomnia  -continue  Seroquel, Hold Remeron while on Linezolid    #History of chronic diarrhea due to collagenous colitis.    #Underweight,Body mass index is 16.64 kg/m .    #Failure to thrive in adult    #Moderate malnutrition  - PT, OT, SW, RD          Diet: Combination Diet Easy to Chew (level 7); Mildly Thick (level 2)  Snacks/Supplements  "Adult: Gelatein Plus; Between Meals  Snacks/Supplements Adult: Magic Cup; Between Meals    DVT Prophylaxis: Enoxaparin (Lovenox) SQ  Abrams Catheter: Not present  Lines: None     Cardiac Monitoring: ACTIVE order. Indication: QTc prolonging medication (48 hours)  Code Status: Full Code      Clinically Significant Risk Factors         # Hypernatremia: Highest Na = 146 mmol/L in last 2 days, will monitor as appropriate      # Hypoalbuminemia: Lowest albumin = 2.8 g/dL at 8/15/2024  8:07 AM, will monitor as appropriate               # Cachexia: Estimated body mass index is 17.76 kg/m  as calculated from the following:    Height as of this encounter: 1.676 m (5' 6\").    Weight as of this encounter: 49.9 kg (110 lb 0.2 oz)., PRESENT ON ADMISSION  # Moderate Malnutrition: based on nutrition assessment, PRESENT ON ADMISSION   # Financial/Environmental Concerns: none               Disposition Plan     Medically Ready for Discharge: Anticipated Tomorrow             Ebenezer Morales DO, DO  Hospitalist Service  [facility]  Securely message with [employer] (more info)  Text page via [employer] Paging/Directory   ______________________________________________________________________    Interval History   Afebrile. No acute issues overnight    Physical Exam   Vital Signs: Temp: 98.6  F (37  C) Temp src: Oral BP: 107/41 Pulse: (!) 41   Resp: 16 SpO2: 95 % O2 Device: None (Room air) Oxygen Delivery: 1 LPM  Weight: 110 lbs .15 oz    General appearance - alert, and in no distress  Eyes - sclera anicteric  Lungs -faint rll crackles, non labored  Heart - rrr. No peripheral edema.  Abdomen - soft, nontender, nondistended, BS+  Neurological - alert, oriented, normal speech, no focal findings or movement disorder noted  Skin - no c/c/p     Lab/imaging reviewed  "

## 2024-08-15 NOTE — PLAN OF CARE
Problem: Adult Inpatient Plan of Care  Goal: Absence of Hospital-Acquired Illness or Injury  Intervention: Identify and Manage Fall Risk  Recent Flowsheet Documentation  Taken 8/15/2024 0900 by Nirmal Gonzalez RN  Safety Promotion/Fall Prevention:   activity supervised   assistive device/personal items within reach   clutter free environment maintained   nonskid shoes/slippers when out of bed   safety round/check completed     Problem: Adult Inpatient Plan of Care  Goal: Optimal Comfort and Wellbeing  Intervention: Monitor Pain and Promote Comfort  Recent Flowsheet Documentation  Taken 8/15/2024 0845 by Nirmal Gonzalez RN  Pain Management Interventions:   medication (see MAR)   emotional support   rest     Goal Outcome Evaluation:      Plan of Care Reviewed With: patient    Overall Patient Progress: improving    SUBJECTIVE:  Pt a/o with VSS. C/o of chronic face pain. Receiving IV ABX. A1 with walker and gaitbelt. Worked with PT/OT. Crackles in lungs remain.     Nirmal Mallory RN

## 2024-08-15 NOTE — PLAN OF CARE
Patient is A/O x 3, scheduled pain medication to manage pain, patient up on a chair during meal to prevent aspiration,  and remain on  contact precaution for MRSA.     Problem: Adult Inpatient Plan of Care  Goal: Optimal Comfort and Wellbeing  Outcome: Progressing     Problem: Swallowing Impairment  Goal: Optimal Eating and Swallowing Without Aspiration  Outcome: Progressing     Problem: Adult Inpatient Plan of Care  Goal: Absence of Hospital-Acquired Illness or Injury  Intervention: Prevent and Manage VTE (Venous Thromboembolism) Risk  Recent Flowsheet Documentation  Taken 8/14/2024 1800 by Diana Snell RN  VTE Prevention/Management: foot pump device off     Problem: Risk for Delirium  Goal: Optimal Coping  Intervention: Optimize Psychosocial Adjustment to Delirium  Recent Flowsheet Documentation  Taken 8/14/2024 1800 by Diana Snell RN  Supportive Measures: active listening utilized  Goal: Improved Behavioral Control  Intervention: Prevent and Manage Agitation  Recent Flowsheet Documentation  Taken 8/14/2024 1800 by Diana Snell RN  Environment Familiarity/Consistency: daily routine followed  Intervention: Minimize Safety Risk  Recent Flowsheet Documentation  Taken 8/14/2024 1800 by Diana Snell RN  Communication Enhancement Strategies: call light answered in person  Goal: Improved Attention and Thought Clarity  Intervention: Maximize Cognitive Function  Recent Flowsheet Documentation  Taken 8/14/2024 1800 by Diana Snell RN  Sensory Stimulation Regulation: lighting decreased  Reorientation Measures: glasses use encouraged     Problem: Pneumonia  Goal: Effective Oxygenation and Ventilation  Intervention: Promote Airway Secretion Clearance  Recent Flowsheet Documentation  Taken 8/14/2024 1800 by Diana Snell RN  Cough And Deep Breathing: done with encouragement     Problem: Risk for Delirium  Goal: Optimal Coping  Intervention: Optimize Psychosocial Adjustment to  Delirium  Recent Flowsheet Documentation  Taken 8/14/2024 1800 by Diana Snell, RN  Supportive Measures: active listening utilized   Goal Outcome Evaluation:

## 2024-08-15 NOTE — PLAN OF CARE
Problem: Adult Inpatient Plan of Care  Goal: Optimal Comfort and Wellbeing  Outcome: Progressing     Problem: Risk for Delirium  Goal: Improved Sleep  Outcome: Progressing     Problem: Pneumonia  Goal: Effective Oxygenation and Ventilation  Outcome: Progressing  Intervention: Promote Airway Secretion Clearance  Recent Flowsheet Documentation  Taken 8/15/2024 0000 by Harsh Kwong RN  Cough And Deep Breathing: done with encouragement  Intervention: Optimize Oxygenation and Ventilation  Recent Flowsheet Documentation  Taken 8/15/2024 0000 by Harsh Kwong, RN  Head of Bed (HOB) Positioning: HOB at 30 degrees   Goal Outcome Evaluation:    A/Ox4.    On 1L O2 to keep sats > 90% while sleeping. On tele with NSR.    Scheduled meds given for chronic pain.     Up to bathroom with assist 1 and walker/gb, voiding.     Slept between cares.          Harsh Kwong RN

## 2024-08-16 ENCOUNTER — APPOINTMENT (OUTPATIENT)
Dept: OCCUPATIONAL THERAPY | Facility: HOSPITAL | Age: 79
DRG: 871 | End: 2024-08-16
Payer: COMMERCIAL

## 2024-08-16 LAB
BACTERIA SPEC CULT: ABNORMAL
HOLD SPECIMEN: NORMAL
MAGNESIUM SERPL-MCNC: 1.9 MG/DL (ref 1.7–2.3)
POTASSIUM SERPL-SCNC: 3.4 MMOL/L (ref 3.4–5.3)
POTASSIUM SERPL-SCNC: 3.6 MMOL/L (ref 3.4–5.3)

## 2024-08-16 PROCEDURE — 250N000013 HC RX MED GY IP 250 OP 250 PS 637: Performed by: INTERNAL MEDICINE

## 2024-08-16 PROCEDURE — 36415 COLL VENOUS BLD VENIPUNCTURE: CPT | Performed by: INTERNAL MEDICINE

## 2024-08-16 PROCEDURE — 250N000011 HC RX IP 250 OP 636: Performed by: INTERNAL MEDICINE

## 2024-08-16 PROCEDURE — 84132 ASSAY OF SERUM POTASSIUM: CPT | Performed by: INTERNAL MEDICINE

## 2024-08-16 PROCEDURE — 97535 SELF CARE MNGMENT TRAINING: CPT | Mod: GO

## 2024-08-16 PROCEDURE — 99222 1ST HOSP IP/OBS MODERATE 55: CPT | Performed by: INTERNAL MEDICINE

## 2024-08-16 PROCEDURE — 83735 ASSAY OF MAGNESIUM: CPT | Performed by: INTERNAL MEDICINE

## 2024-08-16 PROCEDURE — 999N000248 HC STATISTIC IV INSERT WITH US BY RN

## 2024-08-16 PROCEDURE — 99232 SBSQ HOSP IP/OBS MODERATE 35: CPT | Performed by: INTERNAL MEDICINE

## 2024-08-16 PROCEDURE — 120N000001 HC R&B MED SURG/OB

## 2024-08-16 RX ORDER — CEFEPIME HYDROCHLORIDE 1 G/1
1 INJECTION, POWDER, FOR SOLUTION INTRAMUSCULAR; INTRAVENOUS EVERY 12 HOURS
Status: COMPLETED | OUTPATIENT
Start: 2024-08-16 | End: 2024-08-16

## 2024-08-16 RX ORDER — GUAIFENESIN 600 MG/1
1200 TABLET, EXTENDED RELEASE ORAL 2 TIMES DAILY PRN
Status: DISCONTINUED | OUTPATIENT
Start: 2024-08-16 | End: 2024-08-20 | Stop reason: HOSPADM

## 2024-08-16 RX ORDER — DOXYCYCLINE 100 MG/1
100 CAPSULE ORAL EVERY 12 HOURS SCHEDULED
Status: DISCONTINUED | OUTPATIENT
Start: 2024-08-17 | End: 2024-08-20 | Stop reason: HOSPADM

## 2024-08-16 RX ORDER — CEFDINIR 300 MG/1
300 CAPSULE ORAL EVERY 12 HOURS SCHEDULED
Status: DISCONTINUED | OUTPATIENT
Start: 2024-08-17 | End: 2024-08-20 | Stop reason: HOSPADM

## 2024-08-16 RX ORDER — POTASSIUM CHLORIDE 1500 MG/1
20 TABLET, EXTENDED RELEASE ORAL ONCE
Status: COMPLETED | OUTPATIENT
Start: 2024-08-16 | End: 2024-08-16

## 2024-08-16 RX ORDER — VANCOMYCIN HYDROCHLORIDE 1 G/200ML
1000 INJECTION, SOLUTION INTRAVENOUS EVERY 24 HOURS
Status: DISCONTINUED | OUTPATIENT
Start: 2024-08-16 | End: 2024-08-16

## 2024-08-16 RX ORDER — METRONIDAZOLE 500 MG/100ML
500 INJECTION, SOLUTION INTRAVENOUS EVERY 12 HOURS
Status: DISCONTINUED | OUTPATIENT
Start: 2024-08-16 | End: 2024-08-17

## 2024-08-16 RX ADMIN — MIRTAZAPINE 15 MG: 7.5 TABLET, FILM COATED ORAL at 21:55

## 2024-08-16 RX ADMIN — GABAPENTIN 300 MG: 300 CAPSULE ORAL at 21:55

## 2024-08-16 RX ADMIN — POTASSIUM CHLORIDE 20 MEQ: 1500 TABLET, EXTENDED RELEASE ORAL at 10:01

## 2024-08-16 RX ADMIN — ACETAMINOPHEN AND CODEINE PHOSPHATE 1 TABLET: 300; 30 TABLET ORAL at 16:25

## 2024-08-16 RX ADMIN — TOPIRAMATE 50 MG: 25 TABLET, FILM COATED ORAL at 21:59

## 2024-08-16 RX ADMIN — CEFEPIME HYDROCHLORIDE 1 G: 1 INJECTION, POWDER, FOR SOLUTION INTRAMUSCULAR; INTRAVENOUS at 10:22

## 2024-08-16 RX ADMIN — PANTOPRAZOLE SODIUM 40 MG: 20 TABLET, DELAYED RELEASE ORAL at 10:23

## 2024-08-16 RX ADMIN — NORTRIPTYLINE HYDROCHLORIDE 50 MG: 50 CAPSULE ORAL at 21:59

## 2024-08-16 RX ADMIN — LEVETIRACETAM 750 MG: 500 TABLET, FILM COATED ORAL at 10:03

## 2024-08-16 RX ADMIN — CEFEPIME HYDROCHLORIDE 1 G: 1 INJECTION, POWDER, FOR SOLUTION INTRAMUSCULAR; INTRAVENOUS at 22:37

## 2024-08-16 RX ADMIN — FERROUS SULFATE TAB 325 MG (65 MG ELEMENTAL FE) 325 MG: 325 (65 FE) TAB at 10:03

## 2024-08-16 RX ADMIN — Medication 50 MCG: at 10:23

## 2024-08-16 RX ADMIN — POTASSIUM CHLORIDE 20 MEQ: 1500 TABLET, EXTENDED RELEASE ORAL at 10:05

## 2024-08-16 RX ADMIN — LEVETIRACETAM 750 MG: 500 TABLET, FILM COATED ORAL at 21:55

## 2024-08-16 RX ADMIN — VANCOMYCIN HYDROCHLORIDE 1000 MG: 1 INJECTION, SOLUTION INTRAVENOUS at 13:42

## 2024-08-16 RX ADMIN — QUETIAPINE FUMARATE 50 MG: 25 TABLET ORAL at 21:55

## 2024-08-16 RX ADMIN — GUAIFENESIN 1200 MG: 600 TABLET ORAL at 10:04

## 2024-08-16 RX ADMIN — PSYLLIUM HUSK 1 PACKET: 3.4 POWDER ORAL at 10:22

## 2024-08-16 RX ADMIN — ACETAMINOPHEN AND CODEINE PHOSPHATE 1 TABLET: 300; 30 TABLET ORAL at 22:37

## 2024-08-16 RX ADMIN — ACETAMINOPHEN AND CODEINE PHOSPHATE 1 TABLET: 300; 30 TABLET ORAL at 10:26

## 2024-08-16 RX ADMIN — METRONIDAZOLE 500 MG: 5 INJECTION, SOLUTION INTRAVENOUS at 10:22

## 2024-08-16 RX ADMIN — GABAPENTIN 100 MG: 100 CAPSULE ORAL at 10:03

## 2024-08-16 RX ADMIN — OXCARBAZEPINE 450 MG: 150 TABLET, FILM COATED ORAL at 21:58

## 2024-08-16 RX ADMIN — ENOXAPARIN SODIUM 30 MG: 30 INJECTION SUBCUTANEOUS at 22:00

## 2024-08-16 ASSESSMENT — ACTIVITIES OF DAILY LIVING (ADL)
ADLS_ACUITY_SCORE: 40
ADLS_ACUITY_SCORE: 37
ADLS_ACUITY_SCORE: 39
ADLS_ACUITY_SCORE: 40
ADLS_ACUITY_SCORE: 40
ADLS_ACUITY_SCORE: 37
ADLS_ACUITY_SCORE: 40
ADLS_ACUITY_SCORE: 39
ADLS_ACUITY_SCORE: 40
ADLS_ACUITY_SCORE: 39
ADLS_ACUITY_SCORE: 40

## 2024-08-16 NOTE — PLAN OF CARE
Problem: Adult Inpatient Plan of Care  Goal: Plan of Care Review  Description: The Plan of Care Review/Shift note should be completed every shift.  The Outcome Evaluation is a brief statement about your assessment that the patient is improving, declining, or no change.  This information will be displayed automatically on your shift  note.  Outcome: Progressing  Flowsheets (Taken 8/16/2024 2758)  Plan of Care Reviewed With: patient   Goal Outcome Evaluation:      Plan of Care Reviewed With: patient      Patient anxious, tearful early in the shift.  She conveyed how upset she was that the antibiotics interfere with taking her tylenol 3 and wanted to stop them.  MD updated and adjusted orders.    Tylenol 3 resumed and patient reported facial pain much better controlled. Denied need of further interventions.    Patient anxious increased loose stools with antibiotics.  Continue to monitor strict I & O's.    Steady gait with sba.

## 2024-08-16 NOTE — PHARMACY-VANCOMYCIN DOSING SERVICE
Pharmacy Vancomycin Initial Note  Date of Service 2024  Patient's  1945  79 year old, female    Indication: Aspiration Pneumonia    Current estimated CrCl = Estimated Creatinine Clearance: 58.9 mL/min (based on SCr of 0.61 mg/dL).    Creatinine for last 3 days  2024:  6:43 AM Creatinine 0.63 mg/dL  8/15/2024:  8:07 AM Creatinine 0.61 mg/dL    Recent Vancomycin Level(s) for last 3 days  2024:  6:43 AM Vancomycin 12.1 ug/mL      Vancomycin IV Administrations (past 72 hours)                     vancomycin (VANCOCIN) 1,000 mg in 200 mL dextrose intermittent infusion (mg) 1,000 mg New Bag 08/15/24 0915     1,000 mg New Bag 24 0931     1,000 mg New Bag 24 1313                    Nephrotoxins and other renal medications (From now, onward)      None            Contrast Orders - past 72 hours (72h ago, onward)      None            InsightRX Prediction of Planned Initial Vancomycin Regimen    Loading dose: N/A  Regimen: 1000 mg IV every 24 hours.  Start time: 09:15 on 2024  Exposure target: AUC24 (range)400-600 mg/L.hr   AUC24,ss: 427 mg/L.hr  Probability of AUC24 > 400: 66 %  Ctrough,ss: 11.6 mg/L  Probability of Ctrough,ss > 20: 2 %  Probability of nephrotoxicity (Lodise ESTIVEN ): 7 %          Plan:  Resume 1000 mg q24 hrs. May consider decreasing the interval to q18 hrs pending next level/labs   Vancomycin monitoring method: AUC  Vancomycin therapeutic monitoring goal: 400-600 mg*h/L  Pharmacy will check vancomycin levels as appropriate in 1-3 Days.    Serum creatinine levels will be ordered daily for the first week of therapy and at least twice weekly for subsequent weeks.      Mei Valladares, [facility]

## 2024-08-16 NOTE — CONSULTS
Consultation - Infectious Disease  Julisa Chaney,  1945, MRN [id number]    Admitting Dx: Acute respiratory failure with hypoxia (H) [J96.01]  Aspiration pneumonia of right lung, unspecified aspiration pneumonia type, unspecified part of lung (H) [J69.0]    PCP: Mara Murillo, [phone]   Code status:  Full Code       Extended Emergency Contact Information  Primary Emergency Contact: Alissa Del Real   [facility]  Mobile Phone: [phone]  Relation: Daughter       ASSESSMENT   Aspiration pneumonia. Presented after choking episode. WBC 29 on admission. CT with mucus plugging and consolidation RLL. Unable to obtain sputum sample. 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 Could also be from other oral edwige related to poor dentition.   Penicillin allergy although had tolerated previous course of Augmentin in the past.   Intolerance of linezolid or being park T#3/nortriptyline.     MRSA is a possible cause of pneumonia with her known colonization, but likely ok to transition to oral antibiotics including coverage for this. Clinically improved with no fever and normalization of WBC.     Principal Problem:    Aspiration pneumonia of right lung, unspecified aspiration pneumonia type, unspecified part of lung (H)  Active Problems:    Trigeminal neuralgia    Chronic pain syndrome    Iron deficiency anemia due to chronic blood loss    Pyloric ulcer, chronic    Malnutrition, unspecified type (H24)    Adult failure to thrive    Collagenous colitis    Acute respiratory failure with hypoxia (H)    Underweight       PLAN   IV antibiotics overnight, then plan cefdinir and doxycycline for 8 more days  Flagyl short course, can stop at discharge  Separate vitamins from doxycycline so that absorption is not affected.     Scottie Bazzi MD  [facility]  Contact via [employer] or [facility] paging  Clinic  [phone]  ______________________________________________________________________        Reason For Consult: Aspiration pneumonia, MRSA in sputum, unable to tolerate augmentin/linezolid     HPI    We have been requested by Dr. Morales to evaluate Julisa J Ritu.  She has a PMH of trigeminal neuralgia, PUD, MDD, YUE, HLD, collagenous colitis hospitalization in May 2024, migraine headaches presented to ED 8/11 after a choking episode.     In ED O2 sats 88% on room air.    CT chest negative for foreign bodies and central airways, extensive mucous plugging and airspace disease on the right.    WBC 29 on admission. Antibiotics noted below. MRSA + in nares, unable to get sputum sample.     Was nauseated with holding T#3 and nortriptyline while on linezolid yesterday. Back on IV antibiotics.     Minimal if any cough. Still on O2, not on this at home. Remains weak. Has felt hot/cold on and off for months. No fever here. No chest pain. Has severely decayed teeth, chews with her gums.        Medical History  Past Medical History:   Diagnosis Date    Aspiration pneumonia (H) 02/2022    Depression     High cholesterol     Migraines     Pyloric ulcer, chronic     Sepsis (H) 08/10/2020    Trigeminal neuralgia     Surgical History  She  has a past surgical history that includes Colonoscopy w/wo Brush **Performed** (N/A, 8/13/2020); Pr Esophagogastroduodenoscopy Transoral Diagnostic (N/A, 8/13/2020); Hysterectomy; Pr Esophagogastroduodenoscopy Transoral Diagnostic (N/A, 8/14/2020); PICC/Midline Placement (7/22/2022); Ventriculostomy (Left, 7/31/2022); IR Gastrostomy Tube Percutaneous Plcmnt (8/16/2022); and Colonoscopy (N/A, 1/19/2024).   Social History  Reviewed, and she  reports that she quit smoking about 9 years ago. She started smoking about 59 years ago. She has a 50 pack-year smoking history. She has never used smokeless tobacco. She reports that she does not drink alcohol and does not use drugs.   Allergies  Allergies    Allergen Reactions    Contrast [Iohexol] Rash     Noticed during 08/2020 admission. Received IV contrast on 8/5    Chocolate Headache    Contrast Dye Rash    Penicillins Rash     Tolerated 8 week course of Augmentin in 2015    Family History  family history includes Breast Cancer in her maternal aunt, mother, sister, and sister; Cancer in her father; Testicular cancer (age of onset: 17.00) in her grandchild.     Psychosocial Needs  Social History     Social History Narrative    Lives alone in the house, relay in TV dinner  grandkids help with house maintenance  Daughter lives close by.  Mara Murillo MD 7/9/2018 1:49 PM       Additional psychosocial needs reviewed per nursing assessment.         Prior to Admission Medications   Medications Prior to Admission   Medication Sig Dispense Refill Last Dose    acetaminophen-codeine (TYLENOL #3) 300-30 MG per tablet Take 1 tablet by mouth every 6 hours   8/10/2024 at pm    ferrous sulfate (FEROSUL) 325 (65 Fe) MG tablet Take 1 tablet (325 mg) by mouth daily (with breakfast) 90 tablet 1 8/10/2024 at am    gabapentin (NEURONTIN) 100 MG capsule Take 100 mg by mouth daily   8/10/2024 at am    gabapentin (NEURONTIN) 100 MG capsule Take 300 mg by mouth at bedtime   8/10/2024 at pm    levETIRAcetam (KEPPRA) 750 MG tablet Take 1 tablet (750 mg) by mouth 2 times daily 60 tablet 0 8/10/2024 at pm    menthol-zinc oxide (CALMOSEPTINE) 0.44-20.6 % OINT ointment Apply topically 4 times daily as needed for skin protection (for sores on buttox from sitting)   Past Month at prn    METAMUCIL FIBER PO Take 1 capsule by mouth daily   8/10/2024 at am    mirtazapine (REMERON) 15 MG tablet Take 1 tablet (15 mg) by mouth at bedtime 30 tablet 0 8/10/2024 at pm    Multiple Vitamin (MULTIVITAMIN) TABS TAKE 1 TABLET BY MOUTH EVERY  tablet 3 8/10/2024 at am    nortriptyline (PAMELOR) 50 MG capsule Take 1 capsule (50 mg) by mouth at bedtime 90 capsule 3 8/10/2024 at pm    OXcarbazepine  (TRILEPTAL) 150 MG tablet TAKE 3 TABLETS(450 MG) BY MOUTH DAILY 270 tablet 1 8/10/2024 at pm    potassium chloride elin ER (KLOR-CON M20) 20 MEQ CR tablet Take 1 tablet (20 mEq) by mouth daily 90 tablet 1 8/10/2024 at am    QUEtiapine (SEROQUEL) 50 MG tablet Take 1 tablet (50 mg) by mouth at bedtime 30 tablet 0 8/10/2024 at pm    topiramate (TOPAMAX) 50 MG tablet Take 1 tablet (50 mg) by mouth at bedtime 30 tablet 0 8/10/2024 at pm    Vitamin D3 50 mcg (2000 units) tablet Take 1 tablet (50 mcg) by mouth daily 90 tablet 1 8/10/2024 at am          Anti-infectives: cefepime 8/12-15, 8/16-  Flagyl 8/12-15, 8/16-  Vancomycin 8/12-15, 8/16-  Linezolid, augmentin 8/15  Unasyn 8/11-12  Cultures:   Susceptibility data from last 90 days.  Collected Specimen Info Organism Ampicillin Ampicillin/Sulbactam Cefazolin Cefepime Cefoxitin Ceftazidime Ceftriaxone Ciprofloxacin Clindamycin Daptomycin Doxycycline Erythromycin Gentamicin Levofloxacin Linezolid   08/12/24 Swab from Nares, Bilateral Staphylococcus aureus MRSA          R  S  S  R  S   S   05/28/24 Urine, Clean Catch Escherichia coli  S  S  S  S  S  S  S  S      S  S      Collected Specimen Info Organism Nitrofurantoin Oxacillin Piperacillin/Tazobactam Tetracycline Tobramycin Trimethoprim/Sulfamethoxazole  Vancomycin   08/12/24 Swab from Nares, Bilateral Staphylococcus aureus MRSA   R   S   S  S   05/28/24 Urine, Clean Catch Escherichia coli  S   S   S  S      Imaging: reviewed images          Review of Systems:  All other systems negative in detail except what is noted above. Physical Exam:  Temp:  [97.1  F (36.2  C)-98.8  F (37.1  C)] 98.4  F (36.9  C)  Pulse:  [62-87] 66  Resp:  [15-19] 18  BP: (112-148)/(56-65) 115/58  SpO2:  [92 %-100 %] 100 %    GENERAL:  In no acute distress, is alert and oriented to person, place, time. Frail. On O2 NC.   EYES: No conjunctival injection, pupils equally reactive to light, extra-ocular movement intact  HEAD, EARS, NOSE, MOUTH, AND  THROAT: poor dentition.   RESPIRATORY: rales  CARDIOVASCULAR: Regular rate and rhythm, normal S1 and S2, no murmurs, rubs, or gallops.  ABDOMEN: Soft, nontender, no masses.  Normal bowel sounds.  MUSCULOSKELETAL: No synovitis.  SKIN/HAIR/NAILS: No rashes, no signs of peripheral emboli.  NEUROLOGIC: Grossly intact.       Pertinent Labs         Recent Labs   Lab 08/15/24  0807 08/14/24  0643 08/13/24  0548   * 144 141   CO2 27 26 25   BUN 9.4 10.7 12.3       Lab Results   Component Value Date    ALT 12 08/15/2024    AST 9 08/15/2024    ALKPHOS 97 08/15/2024          CRP Inflammation   Date Value Ref Range Status   07/02/2023 128.70 (H) <5.00 mg/L Final          Pertinent Radiology  Radiology Results: Reviewed  XR Video Swallow with SLP or OT    Result Date: 8/12/2024  EXAM: XR VIDEO SWALLOW WITH SLP OR OT LOCATION: [facility] DATE: 8/12/2024 INDICATION: Difficulty swallowing. COMPARISON: None. TECHNIQUE: Routine swallow study with speech pathology using multiple barium thicknesses. RADIATION DOSE: Dose Area Product 8.4 mGy-cm2 FINDINGS: Swallow study with Speech Pathology using multiple barium thicknesses. With thin barium, there was an episode of mild penetration during chin tuck maneuver. Without chin thick maneuver, there was an episode of silent aspiration. No penetration or aspiration with nectar consistency. There was penetration with puree consistency. After deep cough, patient was able to clear some of the puree within the pharynx. There was no aspiration.     IMPRESSION: 1.  Penetration of thin liquid during chin tuck maneuver. 2.  Silent aspiration of thin liquid without chin tuck maneuver. 3.  Penetration with puree consistency.    Chest CT w/o contrast    Result Date: 8/11/2024  EXAM: CT CHEST WITHOUT CONTRAST LOCATION: [facility] DATE: 08/11/2024 INDICATION: Choking episode with pills. Not short of breath.  Evaluate for any obvious foreign body.  Evaluate if consolidation for aspiration pneumonia. Patient is allergic to contrast. COMPARISON: None. TECHNIQUE: CT chest without IV contrast. Multiplanar reformats were obtained. Dose reduction techniques were used. CONTRAST: None. FINDINGS: LUNGS AND PLEURA: Moderate bronchial wall thickening with fairly extensive mucous plugging on the right with postobstructive atelectasis and/or infiltrate. Minimal atelectasis and/or infiltrate in the lingula. No effusions. No definite foreign body demonstrated in the central airways. MEDIASTINUM/AXILLAE: A few mildly prominent lymph nodes are noted which are nonspecific and likely reactive in this setting. CORONARY ARTERY CALCIFICATION: Severe. UPPER ABDOMEN: No acute findings. MUSCULOSKELETAL: No frankly destructive bony lesions.     IMPRESSION: 1.  No definite foreign body demonstrated in the central airways. 2.  Moderate bronchial wall thickening, extensive mucous plugging and airspace disease on the right.

## 2024-08-16 NOTE — PLAN OF CARE
Problem: Pneumonia  Goal: Effective Oxygenation and Ventilation  Outcome: Not Progressing  Intervention: Promote Airway Secretion Clearance  Recent Flowsheet Documentation  Taken 8/16/2024 0000 by Harsh Kwong RN  Cough And Deep Breathing: done with encouragement     Problem: Adult Inpatient Plan of Care  Goal: Optimal Comfort and Wellbeing  Outcome: Progressing     Problem: Risk for Delirium  Goal: Improved Sleep  Outcome: Progressing   Goal Outcome Evaluation:    A/Ox4.    Increased O2 to 2L to keep sats > 90%. Other vitals stable. Coarse crackles in bilateral lungs.     Reported 7/10 chronic pain in face.     Up to bathroom with assist 1, walker. Voiding.     Slept between cares.          Harsh Kwong, RN

## 2024-08-16 NOTE — PROGRESS NOTES
"CLINICAL NUTRITION SERVICES - REASSESSMENT NOTE     Nutrition Prescription    RECOMMENDATIONS FOR MDs/PROVIDERS TO ORDER:    Malnutrition Status:    Moderate malnutrition In Context of:  Chronic illness or disease- improving     Recommendations already ordered by Registered Dietitian (RD):  Continue Gel plus daily, Magic cup daily     Future/Additional Recommendations:  Monitor po intake, supplement victor m., weight, labs, I/Os.      EVALUATION OF THE PROGRESS TOWARD GOALS   Diet: Level 7: Easy to Chew Dysphagia Diet  and Mildly Thick  Thickened Liquids   Nutrition Supplement: Gel Plus daily, Magic Cup daily   Food Allergy: Chocolate   Intake:     8/14 100% breakfast, 50% dinner   8/15 100% breakfast and dinner, po 1521 kcal and 64 g protein   8/16 75% breakfast   Pt meeting 95% estimated calorie needs and 100% protein needs 8/15.      NEW FINDINGS   Met with pt at bedside this AM. Po intakes and appetite improved. Pt reports taking nutrition supplements Gel plus and magic cup. Pt reporting good po intakes at dinner 8/15, consuming 100% of meals and supplements.     ANTHROPOMETRICS  Height: 167.6 cm (5' 6\")  Most Recent Weight: 49.9 kg (110 lb 0.2 oz) bed weight   BMI: Underweight BMI <18.5, 17.7  Weight History: Current weight up   08/13/24  47.3 kg (104 lb 3.2 oz) standing weight   08/11/24 45.4 kg (100 lb)   06/10/24 45.4 kg (100 lb)   06/05/24 45.4 kg (100 lb)   06/03/24 45.3 kg (99 lb 14.4 oz)   05/03/24 41.2 kg (90 lb 12.8 oz)   04/24/24 41.2 kg (90 lb 12.8 oz)   04/19/24 44 kg (97 lb)   04/15/24 44 kg (97 lb)   04/05/24 41.9 kg (92 lb 6 oz)   03/04/24 45.4 kg (100 lb)     Dosing Weight: 45.4 kg     ASSESSED NUTRITION NEEDS  Estimated Energy Needs: 1589+ kcals/day (35+ Raymond/Kg)  Justification: Repletion and Underweight  Estimated Protein Needs: 54+ grams protein/day (1.2+)  Justification: Repletion  Estimated Fluid Needs: 0815-9803 mL/day (30 - 35 mL/kg)   Justification: Maintenance    PHYSICAL FINDINGS/GI " CONCERNS  See malnutrition section below.  Per Flowhseets:  BM: x1 8/15, x3 this AM     LABS  Reviewed  8/15: Na 146(H)     MEDICATIONS  Reviewed  Scheduled tylenol, maxipime, ferrous sulfate, mucinex, flagyl, remeron, pamelor,  protonix, Kcl, metamucil, seroquel, Vit D3    MALNUTRITION  Malnutrition Diagnosis: Moderate malnutrition  In Context of:  Chronic illness or disease    CURRENT NUTRITION DIAGNOSIS  Malnutrition related to chronic illness as evidenced by moderate fat loss and moderate to severe muscle loss   Evaluation: Improving    INTERVENTIONS  Implementation  Medical food supplement therapy- Continue, pt meeting nutrition needs with supplements     Goals  Patient to consume % of nutritionally adequate meals three times per day, or the equivalent with supplements/snacks.- Met, pt consuming 100% x 2 meals + x2 supplements/day     Monitoring/Evaluation  Progress toward goals will be monitored and evaluated per protocol.

## 2024-08-16 NOTE — PROGRESS NOTES
[facility]    Medicine Progress Note - Hospitalist Service    Date of Admission:  8/11/2024    Assessment & Plan   Julisa Chaney is a 79 year old female with PMH of trigeminal neuralgia, PUD, MDD, YUE, HLD, collagenous colitis hospitalization in May 2024, migraine headaches admitted on 8/11/2024 with:     #Aspiration pneumonia  #Sepsis  #Acute respiratory failure with hypoxia, resolved  -CT showed diffuse mucous plugging.  -Tested negative for COVID-19, influenza, RSV  -Most prominent episode of choking with tablets on 8/10.  -Suspect either silent aspiration or results of aspiration, given CT findings with mucous plugging.  -Leukocytosis resolved  -unable to collect sputum cx, nasal MRSA culture positive  -stopped Vancomycin, Cefepime, Flagyl on 8/15 and started Linezolid & Augmentin.  Today, significant n/v and holding of meds due to zyvox caused her worsened pain, no sleep, and feels miserable.  So, restarted same IV antibiotics and requested an ID consult for assistance.  Therefore, unable to discharge back to [facility] today.  -duonebs prn  - I.S., Flutter valve  - RCAT  -HOB 30 degrees  -Aspiration precautions  - Supplemental oxygen to keep SpO2 above 90% prn  -continuous oximetry  -Mild thickened liquid diet due to aspiration on thin liquids  -SLP assistance appreciated.    #Hypotension, resolved  -no anginal complaints  -improved post IVF   -ECG showed improved ST changes anterolateral compared to prior ECG  -Initial troponin 76 -> 68Lactic acid normal.  -Tele  -notify MD for MAP < 65     #Chronic pain syndrome due to trigeminal neuralgia.  #Trigeminal neuralgia  #Possible seizure disorder  -resume nortriptyline, Tylenol 3   -gabapentin, Trileptal, Keppra, Topamax    #History of PUD  -Continue Protonix.    #MDD, insomnia  -continue  Seroquel, and restart Remeron    #History of chronic diarrhea due to collagenous colitis.    #Underweight,Body mass index is 16.64 kg/m .    #Failure to  "thrive in adult    #Moderate malnutrition  - PT, OT, SW, RD          Diet: Combination Diet Easy to Chew (level 7); Mildly Thick (level 2)  Snacks/Supplements Adult: Gelatein Plus; Between Meals  Snacks/Supplements Adult: Magic Cup; Between Meals    DVT Prophylaxis: Enoxaparin (Lovenox) SQ  Abrams Catheter: Not present  Lines: None     Cardiac Monitoring: ACTIVE order. Indication: QTc prolonging medication (48 hours)  Code Status: Full Code      Clinically Significant Risk Factors         # Hypernatremia: Highest Na = 146 mmol/L in last 2 days, will monitor as appropriate      # Hypoalbuminemia: Lowest albumin = 2.8 g/dL at 8/15/2024  8:07 AM, will monitor as appropriate               # Cachexia: Estimated body mass index is 17.76 kg/m  as calculated from the following:    Height as of this encounter: 1.676 m (5' 6\").    Weight as of this encounter: 49.9 kg (110 lb 0.2 oz).   # Moderate Malnutrition: based on nutrition assessment    # Financial/Environmental Concerns: none               Disposition Plan     Medically Ready for Discharge: 1-2 days             Ebenezer Morales DO, DO  Hospitalist Service  [facility]  Securely message with [email or web address] (more info)  Text page via [facility] Paging/Directory   ______________________________________________________________________    Interval History   Afebrile. Miserable since starting Linezolid and Augmentin.  Nausea, vomiting, diarrhea, increased pain, intolerant to oxycodone. Wants her Tylenol #3, nortiptyline, remeron restarted.     Physical Exam   Vital Signs: Temp: 98  F (36.7  C) Temp src: Oral BP: 134/59 Pulse: 87   Resp: 18 SpO2: 92 % O2 Device: Nasal cannula Oxygen Delivery: 2 LPM  Weight: 110 lbs .15 oz    General appearance - alert, and in no distress, uncomfortable and ill appearing much different today than past few days  Eyes - sclera anicteric  Lungs -faint rll crackles, non labored  Heart - rrr. No peripheral edema.  Abdomen - soft, " nontender, nondistended, BS+  Neurological - alert, oriented x3  Skin - no c/c/p     Lab/imaging reviewed

## 2024-08-17 ENCOUNTER — APPOINTMENT (OUTPATIENT)
Dept: OCCUPATIONAL THERAPY | Facility: HOSPITAL | Age: 79
DRG: 871 | End: 2024-08-17
Payer: COMMERCIAL

## 2024-08-17 ENCOUNTER — APPOINTMENT (OUTPATIENT)
Dept: PHYSICAL THERAPY | Facility: HOSPITAL | Age: 79
DRG: 871 | End: 2024-08-17
Payer: COMMERCIAL

## 2024-08-17 LAB
CREAT SERPL-MCNC: 0.56 MG/DL (ref 0.51–0.95)
EGFRCR SERPLBLD CKD-EPI 2021: >90 ML/MIN/1.73M2
MAGNESIUM SERPL-MCNC: 2 MG/DL (ref 1.7–2.3)
PLATELET # BLD AUTO: 370 10E3/UL (ref 150–450)
POTASSIUM SERPL-SCNC: 3.6 MMOL/L (ref 3.4–5.3)

## 2024-08-17 PROCEDURE — 250N000011 HC RX IP 250 OP 636: Performed by: INTERNAL MEDICINE

## 2024-08-17 PROCEDURE — 250N000013 HC RX MED GY IP 250 OP 250 PS 637: Performed by: INTERNAL MEDICINE

## 2024-08-17 PROCEDURE — 97110 THERAPEUTIC EXERCISES: CPT | Mod: GO

## 2024-08-17 PROCEDURE — 120N000001 HC R&B MED SURG/OB

## 2024-08-17 PROCEDURE — 97530 THERAPEUTIC ACTIVITIES: CPT | Mod: GP

## 2024-08-17 PROCEDURE — 83735 ASSAY OF MAGNESIUM: CPT | Performed by: INTERNAL MEDICINE

## 2024-08-17 PROCEDURE — 36415 COLL VENOUS BLD VENIPUNCTURE: CPT | Performed by: INTERNAL MEDICINE

## 2024-08-17 PROCEDURE — 84132 ASSAY OF SERUM POTASSIUM: CPT | Performed by: INTERNAL MEDICINE

## 2024-08-17 PROCEDURE — 99232 SBSQ HOSP IP/OBS MODERATE 35: CPT | Performed by: INTERNAL MEDICINE

## 2024-08-17 PROCEDURE — 99232 SBSQ HOSP IP/OBS MODERATE 35: CPT | Performed by: STUDENT IN AN ORGANIZED HEALTH CARE EDUCATION/TRAINING PROGRAM

## 2024-08-17 PROCEDURE — 97116 GAIT TRAINING THERAPY: CPT | Mod: GP

## 2024-08-17 PROCEDURE — 82565 ASSAY OF CREATININE: CPT | Performed by: INTERNAL MEDICINE

## 2024-08-17 PROCEDURE — 97535 SELF CARE MNGMENT TRAINING: CPT | Mod: GO

## 2024-08-17 PROCEDURE — 85049 AUTOMATED PLATELET COUNT: CPT | Performed by: INTERNAL MEDICINE

## 2024-08-17 RX ORDER — FERROUS SULFATE 325(65) MG
325 TABLET ORAL DAILY
Status: DISCONTINUED | OUTPATIENT
Start: 2024-08-17 | End: 2024-08-20 | Stop reason: HOSPADM

## 2024-08-17 RX ADMIN — PANTOPRAZOLE SODIUM 40 MG: 20 TABLET, DELAYED RELEASE ORAL at 06:39

## 2024-08-17 RX ADMIN — METRONIDAZOLE 500 MG: 5 INJECTION, SOLUTION INTRAVENOUS at 10:32

## 2024-08-17 RX ADMIN — TOPIRAMATE 50 MG: 25 TABLET, FILM COATED ORAL at 21:31

## 2024-08-17 RX ADMIN — DOXYCYCLINE HYCLATE 100 MG: 100 CAPSULE ORAL at 21:29

## 2024-08-17 RX ADMIN — CEFDINIR 300 MG: 300 CAPSULE ORAL at 21:30

## 2024-08-17 RX ADMIN — ACETAMINOPHEN AND CODEINE PHOSPHATE 1 TABLET: 300; 30 TABLET ORAL at 16:51

## 2024-08-17 RX ADMIN — ENOXAPARIN SODIUM 30 MG: 30 INJECTION SUBCUTANEOUS at 21:31

## 2024-08-17 RX ADMIN — OXCARBAZEPINE 450 MG: 150 TABLET, FILM COATED ORAL at 21:30

## 2024-08-17 RX ADMIN — ACETAMINOPHEN AND CODEINE PHOSPHATE 1 TABLET: 300; 30 TABLET ORAL at 10:32

## 2024-08-17 RX ADMIN — LEVETIRACETAM 750 MG: 500 TABLET, FILM COATED ORAL at 21:29

## 2024-08-17 RX ADMIN — MIRTAZAPINE 15 MG: 7.5 TABLET, FILM COATED ORAL at 21:30

## 2024-08-17 RX ADMIN — GABAPENTIN 300 MG: 300 CAPSULE ORAL at 21:29

## 2024-08-17 RX ADMIN — DOXYCYCLINE HYCLATE 100 MG: 100 CAPSULE ORAL at 09:17

## 2024-08-17 RX ADMIN — CEFDINIR 300 MG: 300 CAPSULE ORAL at 09:17

## 2024-08-17 RX ADMIN — LEVETIRACETAM 750 MG: 500 TABLET, FILM COATED ORAL at 09:17

## 2024-08-17 RX ADMIN — METRONIDAZOLE 500 MG: 5 INJECTION, SOLUTION INTRAVENOUS at 00:06

## 2024-08-17 RX ADMIN — ACETAMINOPHEN AND CODEINE PHOSPHATE 1 TABLET: 300; 30 TABLET ORAL at 04:53

## 2024-08-17 RX ADMIN — GABAPENTIN 100 MG: 100 CAPSULE ORAL at 09:17

## 2024-08-17 RX ADMIN — POTASSIUM CHLORIDE 20 MEQ: 1500 TABLET, EXTENDED RELEASE ORAL at 09:17

## 2024-08-17 RX ADMIN — NORTRIPTYLINE HYDROCHLORIDE 50 MG: 50 CAPSULE ORAL at 21:31

## 2024-08-17 RX ADMIN — PSYLLIUM HUSK 1 PACKET: 3.4 POWDER ORAL at 09:18

## 2024-08-17 RX ADMIN — FERROUS SULFATE TAB 325 MG (65 MG ELEMENTAL FE) 325 MG: 325 (65 FE) TAB at 14:31

## 2024-08-17 RX ADMIN — ACETAMINOPHEN AND CODEINE PHOSPHATE 1 TABLET: 300; 30 TABLET ORAL at 21:30

## 2024-08-17 RX ADMIN — QUETIAPINE FUMARATE 50 MG: 25 TABLET ORAL at 21:30

## 2024-08-17 RX ADMIN — Medication 50 MCG: at 09:18

## 2024-08-17 ASSESSMENT — ACTIVITIES OF DAILY LIVING (ADL)
ADLS_ACUITY_SCORE: 39
ADLS_ACUITY_SCORE: 38
ADLS_ACUITY_SCORE: 39
ADLS_ACUITY_SCORE: 38
ADLS_ACUITY_SCORE: 43
ADLS_ACUITY_SCORE: 39
ADLS_ACUITY_SCORE: 38
ADLS_ACUITY_SCORE: 39

## 2024-08-17 NOTE — PROGRESS NOTES
SW assisting primary care manager by submitting [facility] [employer] authorization request. [employer] unable to submit request on [employer] portal so auth initiated by faxing clinical documents to [employer] ([phone]).     ARISTIDES Ewing on 8/17/2024 at 1:57 PM

## 2024-08-17 NOTE — PROGRESS NOTES
[facility]    Medicine Progress Note - Hospitalist Service    Date of Admission:  8/11/2024    Assessment & Plan   Julisa Chaney is a 79 year old female with PMH of trigeminal neuralgia, PUD, MDD, YUE, HLD, collagenous colitis hospitalization in May 2024, migraine headaches admitted on 8/11/2024 with:     #Aspiration pneumonia  #Sepsis  #Acute respiratory failure with hypoxia, resolved  -CT showed diffuse mucous plugging.  -Tested negative for COVID-19, influenza, RSV  -Most prominent episode of choking with tablets on 8/10.  -Suspect either silent aspiration or results of aspiration, given CT findings with mucous plugging.  -Leukocytosis resolved  -Complete Cefdinir and Doxycycline per ID recommendations whose assistance is appreciated  -duonebs prn  - I.S., Flutter valve  - RCAT  -HOB 30 degrees  -Aspiration precautions  - Supplemental oxygen to keep SpO2 above 90% prn  -continuous oximetry  -Mild thickened liquid diet due to aspiration on thin liquids  -SLP assistance appreciated.    #Hypotension, resolved  -no anginal complaints  -improved post IVF   -ECG showed improved ST changes anterolateral compared to prior ECG  -Initial troponin 76 -> 68Lactic acid normal.  -stop Tele  -notify MD for MAP < 65     #Chronic pain syndrome due to trigeminal neuralgia.  #Trigeminal neuralgia  #Possible seizure disorder  -nortriptyline, Tylenol 3   -gabapentin, Trileptal, Keppra, Topamax    #History of PUD  -Continue Protonix.    #MDD, insomnia  -continue  Seroquel, Remeron    #History of chronic diarrhea due to collagenous colitis.    #Underweight,Body mass index is 16.64 kg/m .    #Failure to thrive in adult    #Moderate malnutrition  - PT, OT, SW, RD          Diet: Combination Diet Easy to Chew (level 7); Mildly Thick (level 2)  Snacks/Supplements Adult: Gelatein Plus; Between Meals  Snacks/Supplements Adult: Magic Cup; Between Meals    DVT Prophylaxis: Enoxaparin (Lovenox) SQ  Abrams Catheter: Not  "present  Lines: None     Cardiac Monitoring: ACTIVE order. Indication: QTc prolonging medication (48 hours)  Code Status: Full Code      Clinically Significant Risk Factors              # Hypoalbuminemia: Lowest albumin = 2.8 g/dL at 8/15/2024  8:07 AM, will monitor as appropriate               # Cachexia: Estimated body mass index is 16.82 kg/m  as calculated from the following:    Height as of this encounter: 1.676 m (5' 6\").    Weight as of this encounter: 47.3 kg (104 lb 3.2 oz).   # Moderate Malnutrition: based on nutrition assessment    # Financial/Environmental Concerns: none               Disposition Plan     Medically Ready for Discharge: pending TCU             Ebenezer Morales DO, DO  Hospitalist Service  [facility]  Securely message with [employer] (more info)  Text page via [employer] Paging/Directory   ______________________________________________________________________    Interval History   Afebrile. No acute events overnight.    Physical Exam   Vital Signs: Temp: 98.5  F (36.9  C) Temp src: Oral BP: 115/53 Pulse: 71   Resp: 18 SpO2: 95 % O2 Device: Nasal cannula Oxygen Delivery: 2 LPM  Weight: 104 lbs 3.2 oz    General appearance - nad  Eyes - sclera anicteric  Lungs -faint rll and anterior RUL crackles, non labored  Heart - rrr. No peripheral edema.  Abdomen - soft, nontender, nondistended, BS+  Neurological - alert, oriented x3  Skin - no c/c/p     Lab/imaging reviewed  "

## 2024-08-17 NOTE — PROGRESS NOTES
Patient and her daughter are requesting TCU. Patient prefers [facility]. She is also on list at [facility]. Daughter will transport. PT will reevaluate today for TCU. [employer] authorization will need to be submitted. PAS needed.

## 2024-08-17 NOTE — PLAN OF CARE
Problem: Adult Inpatient Plan of Care  Goal: Absence of Hospital-Acquired Illness or Injury  Intervention: Identify and Manage Fall Risk  Recent Flowsheet Documentation  Taken 8/16/2024 1619 by Dede Rivas, RN  Safety Promotion/Fall Prevention:   activity supervised   assistive device/personal items within reach   clutter free environment maintained   nonskid shoes/slippers when out of bed   safety round/check completed     Problem: Adult Inpatient Plan of Care  Goal: Optimal Comfort and Wellbeing  Intervention: Monitor Pain and Promote Comfort  Recent Flowsheet Documentation  Taken 8/16/2024 1625 by Dede Rivas, RN  Pain Management Interventions: medication (see MAR)     Problem: Pneumonia  Goal: Effective Oxygenation and Ventilation  Intervention: Optimize Oxygenation and Ventilation  Recent Flowsheet Documentation  Taken 8/16/2024 1619 by Dede Rivas, RN  Head of Bed (HOB) Positioning: HOB at 20-30 degrees   Goal Outcome Evaluation:         A/Ox4. VSS on 2L NC. Tele NSR. C/o 9/10 facial pain, scheduled tylenol 3 given. Up assist x1 with walker. Easy to chew diet with mildly thicken liquids. K and Mg protocols, recheck in AM. L PIV infusing int abx. Still need sputum sample. LS with crackles. Nursing to continue to monitor. Contact precautions maintained.

## 2024-08-17 NOTE — PLAN OF CARE
Goal Outcome Evaluation:       Pt is alert and oriented x4. Pain managed by prn and scheduled Tylenol. Up SBA with walker to the bathroom. Vss on RA. Scheduled abx administered. Aspiration and contact precautions observed.

## 2024-08-17 NOTE — PLAN OF CARE
Problem: Adult Inpatient Plan of Care  Goal: Plan of Care Review  Description: The Plan of Care Review/Shift note should be completed every shift.  The Outcome Evaluation is a brief statement about your assessment that the patient is improving, declining, or no change.  This information will be displayed automatically on your shift  note.  Outcome: Progressing  Flowsheets (Taken 8/17/2024 6306)  Plan of Care Reviewed With: patient   Goal Outcome Evaluation:      Plan of Care Reviewed With: patient      Patient reporting facial pain at baseline.  Scheduled tylenol #3 given. Patient denied need of further interventions.      IV antibiotics transitioning to po.  IS and flutter valve in room, patient demonstrates appropriate use but needs a lot of encouragement to complete.

## 2024-08-17 NOTE — PROGRESS NOTES
"[facility] Inpatient follow up       Patient:  Julisa Chaney  Date of birth 1945, Medical record number [id number]  Date of Visit:  08/17/2024  Attending Physician: Ebenezer Morales DO         Assessment and Recommendations:   Assessment:  Julisa Chaney is a 79 year old female with   Aspiration pneumonia. Presented after choking episode. WBC 29 on admission. CT with mucus plugging and consolidation RLL. Unable to obtain sputum sample. 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 Could also be from other oral edwige related to poor dentition.   Penicillin allergy although had tolerated previous course of Augmentin in the past.   Intolerance of linezolid or being park T#3/nortriptyline.        Recommendations:  Complete 8 days of doxycycline and Flagyl and cefdinir.    Discussed with the patient, nursing staff.    ID will sign off.    Juanita Brasher MD.  [facility].   [facility]  Office Telephone [phone].  Fax [phone]  [facility] paging            Interval History:     HPI:  The interval history was reviewed.   Doing better today.  On oral antimicrobial therapy.  Has no complaint.    Pertinent cultures include:  No results found for: \"CULT\"    Recent Inflammatory Biomarkers:   Recent Labs   Lab Test 08/15/24  0807 08/14/24  0643 08/13/24  0548 08/12/24  0636 08/11/24  1230 05/31/24  0441 01/18/24  0633 01/17/24  1847 01/17/24  1055 07/02/23  0629 07/01/23  2342 08/19/22  0559 08/18/22  0601 08/13/22  0558 08/12/22  0516 08/07/22  0740 08/07/22  0612 08/06/22  0705 08/05/22  1045 07/31/22  0622 07/31/22  0039 07/26/22  0856 07/25/22  1358   CRP  --   --   --   --   --   --   --   --   --   --   --   --  17.3*  --  51.8*  --  235.0* 187.0* 31.2*  --  44.4*   < >  --    PCAL  --   --   --   --  0.30 0.25  --  0.05 0.10  --  1.19*  --   --   --   --   --   --   --   --   --   --   --  0.21*   WBC 7.4 " 6.9 11.2* 22.6* 29.0* 6.5   < >  --  5.2   < > 22.1*   < > 21.1*   < > 9.5   < > 13.9* 25.0*  --    < >  --    < >  --     < > = values in this interval not displayed.            Review of Systems:   CONSTITUTIONAL:    Temp Max: Temp (24hrs), Av  F (36.7  C), Min:97.6  F (36.4  C), Max:98.6  F (37  C)   .  Negative except for findings in the HPI.           Current Medications (antimicrobials listed in bold):     Current Facility-Administered Medications   Medication Dose Route Frequency Provider Last Rate Last Admin    acetaminophen-codeine (TYLENOL #3) 300-30 MG per tablet 1 tablet  1 tablet Oral Q6H Ebenezer Morales DO   1 tablet at 24 1032    cefdinir (OMNICEF) capsule 300 mg  300 mg Oral Q12H [facility] () Scottie Bazzi MD   300 mg at 24 0917    doxycycline hyclate (VIBRAMYCIN) capsule 100 mg  100 mg Oral Q12H [facility] () Scottie Bazzi MD   100 mg at 24 0917    enoxaparin ANTICOAGULANT (LOVENOX) injection 30 mg  30 mg Subcutaneous Q24H Jossy Seymour MD   30 mg at 24 2200    ferrous sulfate (FEROSUL) tablet 325 mg  325 mg Oral Daily Ebenezer Morales DO   325 mg at 24 1431    gabapentin (NEURONTIN) capsule 100 mg  100 mg Oral Daily Jossy Seymour MD   100 mg at 24 09    gabapentin (NEURONTIN) capsule 300 mg  300 mg Oral At Bedtime Jossy Seymour MD   300 mg at 24 2155    levETIRAcetam (KEPPRA) tablet 750 mg  750 mg Oral BID Jossy Seymour MD   750 mg at 24 09    mirtazapine (REMERON) tablet TABS 15 mg  15 mg Oral At Bedtime Ebenezer Morales DO   15 mg at 24    nortriptyline (PAMELOR) capsule 50 mg  50 mg Oral At Bedtime Ebenezer Morales DO   50 mg at 24    OXcarbazepine (TRILEPTAL) tablet 450 mg  450 mg Oral QPM Jossy Seymour MD   450 mg at 24    pantoprazole (PROTONIX) EC tablet 40 mg  40 mg Oral QAM AC Jossy Seymour MD   40 mg at 24 0639    potassium chloride  elin ER (KLOR-CON M20) CR tablet 20 mEq  20 mEq Oral Daily Jossy Seymour MD   20 mEq at 08/17/24 0917    psyllium (METAMUCIL/KONSYL) Packet 1 packet  1 packet Oral Daily Jossy Seymour MD   1 packet at 08/17/24 0918    QUEtiapine (SEROquel) tablet 50 mg  50 mg Oral At Bedtime Jossy Seymour MD   50 mg at 08/16/24 2155    sodium chloride (PF) 0.9% PF flush 3 mL  3 mL Intracatheter Q8H Jossy Seymour MD   3 mL at 08/17/24 1431    topiramate (TOPAMAX) tablet 50 mg  50 mg Oral At Bedtime Jossy Seymour MD   50 mg at 08/16/24 2159    Vitamin D3 (CHOLECALCIFEROL) tablet 50 mcg  50 mcg Oral Daily Jossy Seymour MD   50 mcg at 08/17/24 0918              Allergies:     Allergies   Allergen Reactions    Contrast [Iohexol] Rash     Noticed during 08/2020 admission. Received IV contrast on 8/5    Chocolate Headache    Contrast Dye Rash    Penicillins Rash     Tolerated 8 week course of Augmentin in 2015            Physical Exam:   Vitals were reviewed  Patient Vitals for the past 24 hrs:   BP Temp Temp src Pulse Resp SpO2 Weight   08/17/24 1500 98/64 97.6  F (36.4  C) Oral 68 18 92 % --   08/17/24 1203 115/53 98.5  F (36.9  C) Oral 71 18 95 % 47.3 kg (104 lb 3.2 oz)   08/17/24 0812 101/52 98.1  F (36.7  C) Oral 71 18 97 % --   08/17/24 0320 97/55 97.6  F (36.4  C) Oral 55 18 93 % --   08/17/24 0022 109/55 97.8  F (36.6  C) Oral 75 18 100 % --   08/16/24 2024 115/56 98.6  F (37  C) Oral 60 17 97 % --   08/16/24 1625 -- -- -- -- 18 -- --       Physical Examination:  Gen: Pleasant in no acute distress.  HEENT: NCAT. EOMI. PERRL.  Neck: No bruit, JVD or thyromegaly.  Lungs: Clear to ascultation bilat with no crackles or wheezes.  Card: RRR. NSR. No RMG. Peripheral pulses present and symmetric. No edema.  Abd: Soft NT ND. No mass. Normal bowel sounds.  Skin: No rash.  Extr: No edema.  Neuro: Alert and oriented to place time and person. Cranial nerves II to XII intact. Motor and sensory intact. Normal  gait.            Laboratory Data:   ID Labs:  Microbiology labs:  ` Reviewed.    Recent Labs   Lab Test 08/18/22  0601 08/12/22  0516 08/07/22  0612 08/06/22  0705 08/05/22  1045 07/31/22  0039   CRP 17.3* 51.8* 235.0* 187.0* 31.2* 44.4*     Recent Labs   Lab Test 08/15/24  0807 08/14/24  0643 08/13/24  0548 08/12/24  0636 08/11/24  1230 05/31/24  0441   WBC 7.4 6.9 11.2* 22.6* 29.0* 6.5     Recent Labs   Lab Test 08/17/24  0648 08/15/24  0807 08/14/24  0643 08/13/24  0548   CR 0.56 0.61 0.63 0.61   GFRESTIMATED >90 90 90 90       Hematology Studies  Recent Labs   Lab Test 08/17/24  0648 08/15/24  0807 08/14/24  0643 08/13/24  0548 08/12/24  0636 08/11/24  1230 06/01/24  0505 05/31/24  0441 08/07/22  0612 08/06/22  0705   WBC  --  7.4 6.9 11.2* 22.6* 29.0*  --  6.5   < > 25.0*   ANEU  --   --   --   --   --   --   --   --   --  21.8*   AEOS  --   --   --   --   --   --   --   --   --  0.0   HGB  --  11.6* 10.9* 11.0* 11.5* 13.2  --  11.0*   < > 9.2*   HCT  --  38.7 36.3 36.1 37.2 42.6  --  35.9   < > 31.5*    319 290 238 274 299   < > 298   < >  --     < > = values in this interval not displayed.       Metabolic  Recent Labs   Lab Test 08/17/24  0648 08/15/24  0807 08/14/24  0643 08/13/24  0548   NA  --  146* 144 141   BUN  --  9.4 10.7 12.3   CO2  --  27 26 25   CR 0.56 0.61 0.63 0.61   GFRESTIMATED >90 90 90 90       Hepatic Studies  Recent Labs   Lab Test 08/15/24  0807 08/13/24  0548 08/12/24  0636   BILITOTAL 0.2 0.2 0.3   ALKPHOS 97 108 119   ALBUMIN 2.8* 3.0* 3.2*   AST 9 11 15   ALT 12 17 22       Immunologlobulins  Recent Labs   Lab Test 01/20/24  0429 07/02/23  1133 08/08/20  0212 08/05/20  1820     --   --   --    SED  --  32* 13 15            Imaging Data:   Reviewed

## 2024-08-18 ENCOUNTER — APPOINTMENT (OUTPATIENT)
Dept: OCCUPATIONAL THERAPY | Facility: HOSPITAL | Age: 79
DRG: 871 | End: 2024-08-18
Payer: COMMERCIAL

## 2024-08-18 LAB
CREAT SERPL-MCNC: 0.56 MG/DL (ref 0.51–0.95)
EGFRCR SERPLBLD CKD-EPI 2021: >90 ML/MIN/1.73M2
HOLD SPECIMEN: NORMAL
MAGNESIUM SERPL-MCNC: 1.9 MG/DL (ref 1.7–2.3)
POTASSIUM SERPL-SCNC: 3.8 MMOL/L (ref 3.4–5.3)

## 2024-08-18 PROCEDURE — 84132 ASSAY OF SERUM POTASSIUM: CPT | Performed by: INTERNAL MEDICINE

## 2024-08-18 PROCEDURE — 250N000011 HC RX IP 250 OP 636: Performed by: INTERNAL MEDICINE

## 2024-08-18 PROCEDURE — 250N000013 HC RX MED GY IP 250 OP 250 PS 637: Performed by: INTERNAL MEDICINE

## 2024-08-18 PROCEDURE — 97110 THERAPEUTIC EXERCISES: CPT | Mod: GO

## 2024-08-18 PROCEDURE — 82565 ASSAY OF CREATININE: CPT | Performed by: INTERNAL MEDICINE

## 2024-08-18 PROCEDURE — 97535 SELF CARE MNGMENT TRAINING: CPT | Mod: GO

## 2024-08-18 PROCEDURE — 120N000001 HC R&B MED SURG/OB

## 2024-08-18 PROCEDURE — 83735 ASSAY OF MAGNESIUM: CPT | Performed by: INTERNAL MEDICINE

## 2024-08-18 PROCEDURE — 36415 COLL VENOUS BLD VENIPUNCTURE: CPT | Performed by: INTERNAL MEDICINE

## 2024-08-18 PROCEDURE — 99232 SBSQ HOSP IP/OBS MODERATE 35: CPT | Performed by: INTERNAL MEDICINE

## 2024-08-18 RX ORDER — METRONIDAZOLE 500 MG/1
500 TABLET ORAL 2 TIMES DAILY
Status: DISCONTINUED | OUTPATIENT
Start: 2024-08-18 | End: 2024-08-20 | Stop reason: HOSPADM

## 2024-08-18 RX ADMIN — DOXYCYCLINE HYCLATE 100 MG: 100 CAPSULE ORAL at 21:55

## 2024-08-18 RX ADMIN — GABAPENTIN 100 MG: 100 CAPSULE ORAL at 09:57

## 2024-08-18 RX ADMIN — ACETAMINOPHEN AND CODEINE PHOSPHATE 1 TABLET: 300; 30 TABLET ORAL at 16:31

## 2024-08-18 RX ADMIN — OXCARBAZEPINE 450 MG: 150 TABLET, FILM COATED ORAL at 21:57

## 2024-08-18 RX ADMIN — CEFDINIR 300 MG: 300 CAPSULE ORAL at 09:56

## 2024-08-18 RX ADMIN — ACETAMINOPHEN AND CODEINE PHOSPHATE 1 TABLET: 300; 30 TABLET ORAL at 09:57

## 2024-08-18 RX ADMIN — Medication 50 MCG: at 09:56

## 2024-08-18 RX ADMIN — METRONIDAZOLE 500 MG: 500 TABLET ORAL at 21:56

## 2024-08-18 RX ADMIN — NORTRIPTYLINE HYDROCHLORIDE 50 MG: 50 CAPSULE ORAL at 21:54

## 2024-08-18 RX ADMIN — TOPIRAMATE 50 MG: 25 TABLET, FILM COATED ORAL at 21:58

## 2024-08-18 RX ADMIN — PANTOPRAZOLE SODIUM 40 MG: 20 TABLET, DELAYED RELEASE ORAL at 05:14

## 2024-08-18 RX ADMIN — LEVETIRACETAM 750 MG: 500 TABLET, FILM COATED ORAL at 21:53

## 2024-08-18 RX ADMIN — PSYLLIUM HUSK 1 PACKET: 3.4 POWDER ORAL at 09:57

## 2024-08-18 RX ADMIN — CEFDINIR 300 MG: 300 CAPSULE ORAL at 21:52

## 2024-08-18 RX ADMIN — DOXYCYCLINE HYCLATE 100 MG: 100 CAPSULE ORAL at 09:57

## 2024-08-18 RX ADMIN — GABAPENTIN 300 MG: 300 CAPSULE ORAL at 21:55

## 2024-08-18 RX ADMIN — MIRTAZAPINE 15 MG: 7.5 TABLET, FILM COATED ORAL at 21:51

## 2024-08-18 RX ADMIN — ACETAMINOPHEN AND CODEINE PHOSPHATE 1 TABLET: 300; 30 TABLET ORAL at 21:53

## 2024-08-18 RX ADMIN — ACETAMINOPHEN AND CODEINE PHOSPHATE 1 TABLET: 300; 30 TABLET ORAL at 05:13

## 2024-08-18 RX ADMIN — FERROUS SULFATE TAB 325 MG (65 MG ELEMENTAL FE) 325 MG: 325 (65 FE) TAB at 14:11

## 2024-08-18 RX ADMIN — ENOXAPARIN SODIUM 30 MG: 30 INJECTION SUBCUTANEOUS at 21:56

## 2024-08-18 RX ADMIN — METRONIDAZOLE 500 MG: 500 TABLET ORAL at 10:02

## 2024-08-18 RX ADMIN — QUETIAPINE FUMARATE 50 MG: 25 TABLET ORAL at 21:52

## 2024-08-18 RX ADMIN — POTASSIUM CHLORIDE 20 MEQ: 1500 TABLET, EXTENDED RELEASE ORAL at 09:56

## 2024-08-18 RX ADMIN — LEVETIRACETAM 750 MG: 500 TABLET, FILM COATED ORAL at 09:56

## 2024-08-18 ASSESSMENT — ACTIVITIES OF DAILY LIVING (ADL)
ADLS_ACUITY_SCORE: 43
ADLS_ACUITY_SCORE: 43
ADLS_ACUITY_SCORE: 44
ADLS_ACUITY_SCORE: 43
ADLS_ACUITY_SCORE: 44
ADLS_ACUITY_SCORE: 44
ADLS_ACUITY_SCORE: 43
ADLS_ACUITY_SCORE: 44
ADLS_ACUITY_SCORE: 43
ADLS_ACUITY_SCORE: 44
ADLS_ACUITY_SCORE: 43

## 2024-08-18 NOTE — PROGRESS NOTES
Care Management Follow Up    Length of Stay (days): 7    Expected Discharge Date: 08/19/2024    Anticipated Discharge Plan:       Transportation: Anticipate Family/friend    PT Recommendations: Transitional Care Facility  OT Recommendations:  Long term care facility     Barriers to Discharge: placement, financial - prior auth    Prior Living Situation: house with alone    Advanced Directive on File: verified with patient, no concerns identified     Patient/Spokesperson Updated: No    Additional Information:  SW received call from [facility] regarding [employer] auth submitted for Pt. SW confirmed LISA - 8/19 and updated that Pt has not been accepted at a TCU yet. [employer] auth pending clinical review.     ANGELINA GrossW

## 2024-08-18 NOTE — PLAN OF CARE
Problem: Adult Inpatient Plan of Care  Goal: Absence of Hospital-Acquired Illness or Injury  Intervention: Identify and Manage Fall Risk  Recent Flowsheet Documentation  Taken 8/18/2024 0800 by Keri Lawler, RN  Safety Promotion/Fall Prevention:   activity supervised   assistive device/personal items within reach   clutter free environment maintained   nonskid shoes/slippers when out of bed   safety round/check completed  Intervention: Prevent Skin Injury  Recent Flowsheet Documentation  Taken 8/18/2024 0800 by Keri Lawler RN  Body Position: position changed independently   Goal Outcome Evaluation:      Plan of Care Reviewed With: patient     Patient reporting feeling better.  O2 maintaining above 92% on room air.  Nonproductive cough.  Patient currently on po antibiotics.    Chronic facial pain.  Tylenol 3 every 6 hours.

## 2024-08-18 NOTE — PLAN OF CARE
Problem: Adult Inpatient Plan of Care  Goal: Absence of Hospital-Acquired Illness or Injury  Outcome: Progressing  Intervention: Identify and Manage Fall Risk  Recent Flowsheet Documentation  Taken 8/17/2024 2129 by Frantz Rivas, RN  Safety Promotion/Fall Prevention:   activity supervised   assistive device/personal items within reach   clutter free environment maintained   nonskid shoes/slippers when out of bed   safety round/check completed  Intervention: Prevent Skin Injury  Recent Flowsheet Documentation  Taken 8/17/2024 2129 by Frantz Rivas, RN  Body Position: position changed independently     Problem: Swallowing Impairment  Goal: Optimal Eating and Swallowing Without Aspiration  Outcome: Progressing   Goal Outcome Evaluation:       A&Ox4. Able to make needs known. Has 9/10 constant pain, gave scheduled tylenol x2. Takes pills in apple sauce, tolerating well, no aspiration noted. SBA to the bathroom. K and Mg recheck in the am.

## 2024-08-18 NOTE — PROGRESS NOTES
[facility]    Medicine Progress Note - Hospitalist Service    Date of Admission:  8/11/2024    Assessment & Plan   Julisa Chaney is a 79 year old female with PMH of trigeminal neuralgia, PUD, MDD, YUE, HLD, collagenous colitis hospitalization in May 2024, migraine headaches admitted on 8/11/2024 with:     #Community acquired aspiration pneumonia  #Sepsis due to aspiration pneumonia  #Acute respiratory failure with hypoxia, resolved  -CT Chest w/o contrast - moderate bronchial wall thickening with fairly extensive mucous plugging on the right with postobstructive atelectasis and/or infiltrate.   -Tested negative for COVID-19, influenza, RSV  -Most prominent episode of choking with tablets on 8/10.  --Mild thickened liquid diet due to aspiration on thin liquids via VSS performed by SLP  -Leukocytosis resolved  -Complete Cefdinir, Doxycycline and ID x 8 days starting from 8/17/24 per ID   -duonebs prn  - I.S., Flutter valve  - RCAT  -HOB 30 degrees  -Aspiration precautions    #Hypotension, resolved  -no anginal complaints  -resolved with IVF   -ECG showed improved ST changes anterolateral compared to prior ECG  -Initial troponin 76 -> 68.  Lactic acid normal.  -Tele discontinued as no unstable rhythms  -notify MD for MAP < 65     #Chronic pain syndrome due to trigeminal neuralgia.  #Trigeminal neuralgia  #Possible seizure disorder  -nortriptyline, Tylenol 3   -gabapentin, Trileptal, Keppra, Topamax    #History of PUD/GERD  -Protonix.    #MDD, insomnia  -Seroquel, Remeron    #History of chronic diarrhea due to collagenous colitis.    #Underweight,Body mass index is 16.64 kg/m .    #Failure to thrive in adult  #Weakness/Deconditioning  -TCU planned on discharge    #Moderate malnutrition  -RD following          Diet: Combination Diet Easy to Chew (level 7); Mildly Thick (level 2)  Snacks/Supplements Adult: Gelatein Plus; Between Meals  Snacks/Supplements Adult: Magic Cup; Between Meals    DVT  "Prophylaxis: Enoxaparin (Lovenox) SQ  Abrams Catheter: Not present  Lines: None     Cardiac Monitoring: None  Code Status: Full Code      Clinically Significant Risk Factors              # Hypoalbuminemia: Lowest albumin = 2.8 g/dL at 8/15/2024  8:07 AM, will monitor as appropriate               # Cachexia: Estimated body mass index is 16.56 kg/m  as calculated from the following:    Height as of this encounter: 1.676 m (5' 6\").    Weight as of this encounter: 46.5 kg (102 lb 9.6 oz).   # Moderate Malnutrition: based on nutrition assessment    # Financial/Environmental Concerns: none               Disposition Plan     Medically Ready for Discharge: [facility] T[facility] pending             Ebenezer Morales DO, DO  Hospitalist Service  [facility]  Securely message with [employer] (more info)  Text page via [employer] Paging/Directory   ______________________________________________________________________    Interval History   Afebrile. Stable night. No acute issues.    Physical Exam   Vital Signs: Temp: 98.3  F (36.8  C) Temp src: Oral BP: 121/59 Pulse: 69   Resp: 18 SpO2: 92 % O2 Device: None (Room air) Oxygen Delivery: 2 LPM  Weight: 102 lbs 9.6 oz    General appearance - nad  Eyes - sclera anicteric  Lungs -decreased bases, faint crackles right anterior lung field, non labored  Heart - rrr. No peripheral edema.  Abdomen - soft, nontender, nondistended, BS+  Neurological - alert, oriented x3  Skin - no c/c/p     Labs reviewed  "

## 2024-08-19 ENCOUNTER — APPOINTMENT (OUTPATIENT)
Dept: OCCUPATIONAL THERAPY | Facility: HOSPITAL | Age: 79
DRG: 871 | End: 2024-08-19
Payer: COMMERCIAL

## 2024-08-19 ENCOUNTER — APPOINTMENT (OUTPATIENT)
Dept: PHYSICAL THERAPY | Facility: HOSPITAL | Age: 79
DRG: 871 | End: 2024-08-19
Payer: COMMERCIAL

## 2024-08-19 LAB
CREAT SERPL-MCNC: 0.54 MG/DL (ref 0.51–0.95)
EGFRCR SERPLBLD CKD-EPI 2021: >90 ML/MIN/1.73M2
MAGNESIUM SERPL-MCNC: 1.8 MG/DL (ref 1.7–2.3)
POTASSIUM SERPL-SCNC: 4 MMOL/L (ref 3.4–5.3)

## 2024-08-19 PROCEDURE — 97535 SELF CARE MNGMENT TRAINING: CPT | Mod: GO

## 2024-08-19 PROCEDURE — 97112 NEUROMUSCULAR REEDUCATION: CPT | Mod: GP

## 2024-08-19 PROCEDURE — 250N000013 HC RX MED GY IP 250 OP 250 PS 637: Performed by: STUDENT IN AN ORGANIZED HEALTH CARE EDUCATION/TRAINING PROGRAM

## 2024-08-19 PROCEDURE — 82565 ASSAY OF CREATININE: CPT | Performed by: INTERNAL MEDICINE

## 2024-08-19 PROCEDURE — 97542 WHEELCHAIR MNGMENT TRAINING: CPT | Mod: GP

## 2024-08-19 PROCEDURE — 250N000013 HC RX MED GY IP 250 OP 250 PS 637: Performed by: INTERNAL MEDICINE

## 2024-08-19 PROCEDURE — 36415 COLL VENOUS BLD VENIPUNCTURE: CPT | Performed by: INTERNAL MEDICINE

## 2024-08-19 PROCEDURE — 84132 ASSAY OF SERUM POTASSIUM: CPT | Performed by: INTERNAL MEDICINE

## 2024-08-19 PROCEDURE — 97116 GAIT TRAINING THERAPY: CPT | Mod: GP

## 2024-08-19 PROCEDURE — 99232 SBSQ HOSP IP/OBS MODERATE 35: CPT | Performed by: INTERNAL MEDICINE

## 2024-08-19 PROCEDURE — 97110 THERAPEUTIC EXERCISES: CPT | Mod: GO

## 2024-08-19 PROCEDURE — 83735 ASSAY OF MAGNESIUM: CPT | Performed by: INTERNAL MEDICINE

## 2024-08-19 PROCEDURE — 120N000001 HC R&B MED SURG/OB

## 2024-08-19 PROCEDURE — 250N000011 HC RX IP 250 OP 636: Performed by: INTERNAL MEDICINE

## 2024-08-19 RX ADMIN — POTASSIUM CHLORIDE 20 MEQ: 1500 TABLET, EXTENDED RELEASE ORAL at 08:38

## 2024-08-19 RX ADMIN — PSYLLIUM HUSK 1 PACKET: 3.4 POWDER ORAL at 08:40

## 2024-08-19 RX ADMIN — NORTRIPTYLINE HYDROCHLORIDE 50 MG: 50 CAPSULE ORAL at 20:08

## 2024-08-19 RX ADMIN — LEVETIRACETAM 750 MG: 500 TABLET, FILM COATED ORAL at 20:12

## 2024-08-19 RX ADMIN — GABAPENTIN 100 MG: 100 CAPSULE ORAL at 08:39

## 2024-08-19 RX ADMIN — FERROUS SULFATE TAB 325 MG (65 MG ELEMENTAL FE) 325 MG: 325 (65 FE) TAB at 14:40

## 2024-08-19 RX ADMIN — LEVETIRACETAM 750 MG: 500 TABLET, FILM COATED ORAL at 08:38

## 2024-08-19 RX ADMIN — CEFDINIR 300 MG: 300 CAPSULE ORAL at 08:40

## 2024-08-19 RX ADMIN — ACETAMINOPHEN AND CODEINE PHOSPHATE 1 TABLET: 300; 30 TABLET ORAL at 10:27

## 2024-08-19 RX ADMIN — ACETAMINOPHEN AND CODEINE PHOSPHATE 1 TABLET: 300; 30 TABLET ORAL at 16:13

## 2024-08-19 RX ADMIN — ENOXAPARIN SODIUM 30 MG: 30 INJECTION SUBCUTANEOUS at 20:07

## 2024-08-19 RX ADMIN — OXCARBAZEPINE 450 MG: 150 TABLET, FILM COATED ORAL at 20:10

## 2024-08-19 RX ADMIN — DOXYCYCLINE HYCLATE 100 MG: 100 CAPSULE ORAL at 08:38

## 2024-08-19 RX ADMIN — PANTOPRAZOLE SODIUM 40 MG: 20 TABLET, DELAYED RELEASE ORAL at 06:47

## 2024-08-19 RX ADMIN — CEFDINIR 300 MG: 300 CAPSULE ORAL at 20:11

## 2024-08-19 RX ADMIN — ACETAMINOPHEN AND CODEINE PHOSPHATE 1 TABLET: 300; 30 TABLET ORAL at 04:39

## 2024-08-19 RX ADMIN — TOPIRAMATE 50 MG: 25 TABLET, FILM COATED ORAL at 22:02

## 2024-08-19 RX ADMIN — GABAPENTIN 300 MG: 300 CAPSULE ORAL at 20:09

## 2024-08-19 RX ADMIN — QUETIAPINE FUMARATE 50 MG: 25 TABLET ORAL at 22:01

## 2024-08-19 RX ADMIN — DOXYCYCLINE HYCLATE 100 MG: 100 CAPSULE ORAL at 20:11

## 2024-08-19 RX ADMIN — ACETAMINOPHEN AND CODEINE PHOSPHATE 1 TABLET: 300; 30 TABLET ORAL at 22:01

## 2024-08-19 RX ADMIN — METRONIDAZOLE 500 MG: 500 TABLET ORAL at 20:11

## 2024-08-19 RX ADMIN — METRONIDAZOLE 500 MG: 500 TABLET ORAL at 08:40

## 2024-08-19 RX ADMIN — MIRTAZAPINE 15 MG: 7.5 TABLET, FILM COATED ORAL at 20:09

## 2024-08-19 RX ADMIN — Medication 50 MCG: at 08:39

## 2024-08-19 ASSESSMENT — ACTIVITIES OF DAILY LIVING (ADL)
ADLS_ACUITY_SCORE: 44
ADLS_ACUITY_SCORE: 44
ADLS_ACUITY_SCORE: 45
ADLS_ACUITY_SCORE: 44
ADLS_ACUITY_SCORE: 45
ADLS_ACUITY_SCORE: 44
ADLS_ACUITY_SCORE: 45
ADLS_ACUITY_SCORE: 44
ADLS_ACUITY_SCORE: 44
ADLS_ACUITY_SCORE: 45
ADLS_ACUITY_SCORE: 44
ADLS_ACUITY_SCORE: 45
ADLS_ACUITY_SCORE: 45
ADLS_ACUITY_SCORE: 44
ADLS_ACUITY_SCORE: 44
ADLS_ACUITY_SCORE: 45
ADLS_ACUITY_SCORE: 44
ADLS_ACUITY_SCORE: 44

## 2024-08-19 NOTE — PLAN OF CARE
Goal Outcome Evaluation:  Pt reports that her breathing is improved. Lung sounds diminished with some slight crackles to the base of the right lung. Assisted to sit in the chair for dinner. Pt did not have any noted swallowing difficulty. Mildly thickened liquids provided. Pt took pills without difficulty. Pt refused to ambulate in the hallway.

## 2024-08-19 NOTE — PROGRESS NOTES
[facility]    Medicine Progress Note - Hospitalist Service    Date of Admission:  8/11/2024    Assessment & Plan   Julisa Chaney is a 79 year old female with history of of trigeminal neuralgia, PUD, MDD, YUE, HLD, collagenous colitis and migraine headaches admitted on 8/11/2024 with aspiration pneumonia after episode of choking.  CT scan revealed mucous plugging and right lower lung consolidation.  She is receiving cefdinir, doxycycline and metronidazole (to be completed 8/24/2024-to complete 8 days of antibiotic therapy) as recommended by ID specialist.  Patient is deconditioned and cachectic.  She is requiring assistance with ambulation and getting from bed to chair.  Awaiting TCU placement.  Peer to peer review scheduled for 2:30 PM on 8/19/2024.     #Community acquired aspiration pneumonia  #Sepsis due to aspiration pneumonia  #Acute respiratory failure with hypoxia, resolved  -CT Chest w/o contrast - moderate bronchial wall thickening with fairly extensive mucous plugging on the right with postobstructive atelectasis and/or infiltrate.   -Tested negative for COVID-19, influenza, RSV  -Most prominent episode of choking with tablets on 8/10.  --Mild thickened liquid diet due to aspiration on thin liquids via VSS performed by SLP  -Leukocytosis resolved  -Complete Cefdinir, Doxycycline and ID x 8 days starting from 8/17/24 per ID   -duonebs prn  - I.S., Flutter valve  - RCAT  -HOB 30 degrees  -Aspiration precautions    #Hypotension, resolved  -no anginal complaints  -resolved with IVF   -ECG showed improved ST changes anterolateral compared to prior ECG  -Initial troponin 76 -> 68.  Lactic acid normal.  -Tele discontinued as no unstable rhythms  -notify MD for MAP < 65     #Chronic pain syndrome due to trigeminal neuralgia.  #Trigeminal neuralgia  #Possible seizure disorder  -nortriptyline, Tylenol 3   -gabapentin, Trileptal, Keppra, Topamax    #History of PUD/GERD  -Protonix.    #MDD,  "insomnia  -Seroquel, Remeron    #History of chronic diarrhea due to collagenous colitis.    #Underweight,Body mass index is 16.64 kg/m .    #Failure to thrive in adult  #Weakness/Deconditioning  -TCU planned on discharge    #Moderate malnutrition  -RD following          Diet: Combination Diet Easy to Chew (level 7); Mildly Thick (level 2)  Snacks/Supplements Adult: Gelatein Plus; Between Meals  Snacks/Supplements Adult: Magic Cup; Between Meals    DVT Prophylaxis: Enoxaparin (Lovenox) SQ  Abrams Catheter: Not present  Lines: None     Cardiac Monitoring: None  Code Status: Full Code      Clinically Significant Risk Factors              # Hypoalbuminemia: Lowest albumin = 2.8 g/dL at 8/15/2024  8:07 AM, will monitor as appropriate               # Cachexia: Estimated body mass index is 16.11 kg/m  as calculated from the following:    Height as of this encounter: 1.676 m (5' 6\").    Weight as of this encounter: 45.3 kg (99 lb 12.8 oz).   # Moderate Malnutrition: based on nutrition assessment      # Financial/Environmental Concerns: none               Disposition Plan     Medically Ready for Discharge: [facility] pending             Tonja Pandya MD  Hospitalist Service  [facility]  Securely message with [employer] (more info)  Text page via [employer] Paging/Directory   ______________________________________________________________________    Interval History   No new complaints today and no acute events overnight.  Awaiting TCU placement.  Peer to peer review scheduled for 2:30 PM on 8/19/2024    Physical Exam   Vital Signs: Temp: 98.5  F (36.9  C) Temp src: Oral BP: 101/51 Pulse: 66   Resp: 20 SpO2: 96 % O2 Device: None (Room air)    Weight: 99 lbs 12.8 oz    General appearance - nad  Eyes - sclera anicteric  Lungs -decreased bases, faint crackles right anterior lung field, non labored  Heart - rrr. No peripheral edema.  Abdomen - soft, nontender, nondistended, BS+  Neurological - alert, " oriented x3  Skin - no c/c/p     Labs reviewed

## 2024-08-19 NOTE — PLAN OF CARE
Problem: Adult Inpatient Plan of Care  Goal: Plan of Care Review  Description: The Plan of Care Review/Shift note should be completed every shift.  The Outcome Evaluation is a brief statement about your assessment that the patient is improving, declining, or no change.  This information will be displayed automatically on your shift  note.  Outcome: Progressing     Problem: Risk for Delirium  Goal: Optimal Coping  Outcome: Progressing     Problem: Pneumonia  Goal: Fluid Balance  Outcome: Progressing   Goal Outcome Evaluation:      - Alert and oriented, vss on room air    -Rested well overnight    -Took scheduled Tylenol 3 for pain    -Contact and aspiration precautions maintained

## 2024-08-19 NOTE — PLAN OF CARE
Problem: Adult Inpatient Plan of Care  Goal: Plan of Care Review  Description: The Plan of Care Review/Shift note should be completed every shift.  The Outcome Evaluation is a brief statement about your assessment that the patient is improving, declining, or no change.  This information will be displayed automatically on your shift  note.  Outcome: Progressing  Flowsheets (Taken 8/19/2024 1037)  Plan of Care Reviewed With: patient   Goal Outcome Evaluation:      Plan of Care Reviewed With: patient      Patient on room air, maintaining o2 saturation.  Decreased congestion and coughing noted.  Patient unable to collect specimen.  Specimen cup still at bedside.  Patient continues to utilize IS and flutter.      Patient waiting TCU placement.

## 2024-08-19 NOTE — PLAN OF CARE
Problem: Adult Inpatient Plan of Care  Goal: Plan of Care Review  Description: The Plan of Care Review/Shift note should be completed every shift.  The Outcome Evaluation is a brief statement about your assessment that the patient is improving, declining, or no change.  This information will be displayed automatically on your shift  note.  Outcome: Progressing  Flowsheets (Taken 8/19/2024 1037)  Plan of Care Reviewed With: patient   Goal Outcome Evaluation:      Plan of Care Reviewed With: patient      Patient increased activity today.  Ambulated to chair with walker.  SBA only.  Tolerated well.    Maintaining o2 sat.s on room air.  Decreased congestion, cough resolved.    Waiting TCU placement.

## 2024-08-20 ENCOUNTER — APPOINTMENT (OUTPATIENT)
Dept: OCCUPATIONAL THERAPY | Facility: HOSPITAL | Age: 79
DRG: 871 | End: 2024-08-20
Payer: COMMERCIAL

## 2024-08-20 VITALS
OXYGEN SATURATION: 94 % | SYSTOLIC BLOOD PRESSURE: 107 MMHG | BODY MASS INDEX: 16.04 KG/M2 | TEMPERATURE: 98 F | HEART RATE: 68 BPM | RESPIRATION RATE: 18 BRPM | DIASTOLIC BLOOD PRESSURE: 53 MMHG | WEIGHT: 99.8 LBS | HEIGHT: 66 IN

## 2024-08-20 LAB
CREAT SERPL-MCNC: 0.59 MG/DL (ref 0.51–0.95)
EGFRCR SERPLBLD CKD-EPI 2021: >90 ML/MIN/1.73M2
MAGNESIUM SERPL-MCNC: 1.8 MG/DL (ref 1.7–2.3)
PLATELET # BLD AUTO: 359 10E3/UL (ref 150–450)
POTASSIUM SERPL-SCNC: 4.1 MMOL/L (ref 3.4–5.3)

## 2024-08-20 PROCEDURE — 250N000013 HC RX MED GY IP 250 OP 250 PS 637: Performed by: STUDENT IN AN ORGANIZED HEALTH CARE EDUCATION/TRAINING PROGRAM

## 2024-08-20 PROCEDURE — 36415 COLL VENOUS BLD VENIPUNCTURE: CPT | Performed by: INTERNAL MEDICINE

## 2024-08-20 PROCEDURE — 97110 THERAPEUTIC EXERCISES: CPT | Mod: GO

## 2024-08-20 PROCEDURE — 82565 ASSAY OF CREATININE: CPT | Performed by: INTERNAL MEDICINE

## 2024-08-20 PROCEDURE — 97530 THERAPEUTIC ACTIVITIES: CPT | Mod: GO

## 2024-08-20 PROCEDURE — 97535 SELF CARE MNGMENT TRAINING: CPT | Mod: GO

## 2024-08-20 PROCEDURE — 250N000013 HC RX MED GY IP 250 OP 250 PS 637: Performed by: INTERNAL MEDICINE

## 2024-08-20 PROCEDURE — 85049 AUTOMATED PLATELET COUNT: CPT | Performed by: INTERNAL MEDICINE

## 2024-08-20 PROCEDURE — 99239 HOSP IP/OBS DSCHRG MGMT >30: CPT | Performed by: INTERNAL MEDICINE

## 2024-08-20 PROCEDURE — 83735 ASSAY OF MAGNESIUM: CPT | Performed by: INTERNAL MEDICINE

## 2024-08-20 PROCEDURE — 84132 ASSAY OF SERUM POTASSIUM: CPT | Performed by: INTERNAL MEDICINE

## 2024-08-20 RX ORDER — CEFDINIR 300 MG/1
300 CAPSULE ORAL EVERY 12 HOURS
DISCHARGE
Start: 2024-08-20 | End: 2024-08-25

## 2024-08-20 RX ORDER — AMOXICILLIN 250 MG
1 CAPSULE ORAL 2 TIMES DAILY PRN
DISCHARGE
Start: 2024-08-20

## 2024-08-20 RX ORDER — ONDANSETRON 4 MG/1
4 TABLET, ORALLY DISINTEGRATING ORAL EVERY 6 HOURS PRN
DISCHARGE
Start: 2024-08-20

## 2024-08-20 RX ORDER — LANOLIN ALCOHOL/MO/W.PET/CERES
3 CREAM (GRAM) TOPICAL
DISCHARGE
Start: 2024-08-20

## 2024-08-20 RX ORDER — METRONIDAZOLE 500 MG/1
500 TABLET ORAL 2 TIMES DAILY
DISCHARGE
Start: 2024-08-20 | End: 2024-08-20

## 2024-08-20 RX ORDER — GUAIFENESIN 600 MG/1
1200 TABLET, EXTENDED RELEASE ORAL 2 TIMES DAILY PRN
DISCHARGE
Start: 2024-08-20 | End: 2024-08-25

## 2024-08-20 RX ORDER — PANTOPRAZOLE SODIUM 40 MG/1
40 TABLET, DELAYED RELEASE ORAL
DISCHARGE
Start: 2024-08-21

## 2024-08-20 RX ORDER — METRONIDAZOLE 500 MG/1
500 TABLET ORAL 2 TIMES DAILY
Qty: 10 TABLET | Refills: 0 | DISCHARGE
Start: 2024-08-20 | End: 2024-08-28

## 2024-08-20 RX ORDER — ACETAMINOPHEN AND CODEINE PHOSPHATE 300; 30 MG/1; MG/1
1 TABLET ORAL EVERY 6 HOURS PRN
Qty: 12 TABLET | Refills: 0 | Status: SHIPPED | OUTPATIENT
Start: 2024-08-20 | End: 2024-08-21

## 2024-08-20 RX ORDER — DOXYCYCLINE 100 MG/1
100 CAPSULE ORAL EVERY 12 HOURS
DISCHARGE
Start: 2024-08-20 | End: 2024-08-25

## 2024-08-20 RX ORDER — ACETAMINOPHEN AND CODEINE PHOSPHATE 300; 30 MG/1; MG/1
1 TABLET ORAL EVERY 6 HOURS PRN
Status: SHIPPED | DISCHARGE
Start: 2024-08-20 | End: 2024-08-20

## 2024-08-20 RX ORDER — LOPERAMIDE HCL 2 MG
2 CAPSULE ORAL 4 TIMES DAILY PRN
DISCHARGE
Start: 2024-08-20

## 2024-08-20 RX ADMIN — PSYLLIUM HUSK 1 PACKET: 3.4 POWDER ORAL at 08:43

## 2024-08-20 RX ADMIN — ACETAMINOPHEN AND CODEINE PHOSPHATE 1 TABLET: 300; 30 TABLET ORAL at 04:13

## 2024-08-20 RX ADMIN — METRONIDAZOLE 500 MG: 500 TABLET ORAL at 08:43

## 2024-08-20 RX ADMIN — ACETAMINOPHEN AND CODEINE PHOSPHATE 1 TABLET: 300; 30 TABLET ORAL at 10:01

## 2024-08-20 RX ADMIN — DOXYCYCLINE HYCLATE 100 MG: 100 CAPSULE ORAL at 08:43

## 2024-08-20 RX ADMIN — Medication 50 MCG: at 08:43

## 2024-08-20 RX ADMIN — LEVETIRACETAM 750 MG: 500 TABLET, FILM COATED ORAL at 08:42

## 2024-08-20 RX ADMIN — GABAPENTIN 100 MG: 100 CAPSULE ORAL at 08:43

## 2024-08-20 RX ADMIN — CEFDINIR 300 MG: 300 CAPSULE ORAL at 08:43

## 2024-08-20 RX ADMIN — PANTOPRAZOLE SODIUM 40 MG: 20 TABLET, DELAYED RELEASE ORAL at 06:33

## 2024-08-20 RX ADMIN — POTASSIUM CHLORIDE 20 MEQ: 1500 TABLET, EXTENDED RELEASE ORAL at 08:43

## 2024-08-20 ASSESSMENT — ACTIVITIES OF DAILY LIVING (ADL)
ADLS_ACUITY_SCORE: 45

## 2024-08-20 NOTE — PROGRESS NOTES
Care Management Discharge Note    Discharge Date: 08/20/2024       Discharge Disposition:  transitional care    Discharge Services:  rehab    Discharge DME:      Discharge Transportation: family or friend will provide. Daughter will transport at 1300 8/20    Private pay costs discussed: transportation costs    Does the patient's insurance plan have a 3 day qualifying hospital stay waiver?  Geraldine, Emily wilde obtained    PAS Confirmation Code: [id number]  Patient/family educated on Medicare website which has current facility and service quality ratings:  yes    Education Provided on the Discharge Plan:  yes  Persons Notified of Discharge Plans: yes. She will call her daughter  Patient/Family in Agreement with the Plan: yes    Handoff Referral Completed: Yes    Additional Information:  Patient to discharge to [facility] today. Daughter will transport at 1300. Orders faxed. [place] updated.    WILEY Dyer

## 2024-08-20 NOTE — PLAN OF CARE
Goal Outcome Evaluation:       Alert and oriented. Pt resting well between cares in this shift. Vss on room air. Pain managed by scheduled tylenol 3. Contact precautions maintained.

## 2024-08-20 NOTE — PLAN OF CARE
RN discussed discharge instructions with patient.  Patient will discharge with family to [facility] rehab at 1 pm.  Packet will be sent with patient.  Patient verbalized understanding and voiced no concerns at this time.

## 2024-08-20 NOTE — DISCHARGE SUMMARY
"[facility]  Hospitalist Discharge Summary      Date of Admission:  8/11/2024  Date of Discharge:  8/20/2024  Discharging Provider: Tonja Pandya MD  Discharge Service: Hospitalist Service    Discharge Diagnoses   Sepsis due to aspiration pneumonia  Acute respiratory failure with hypoxia  Hypotension, resolved  Chronic pain syndrome due to trigeminal neuralgia  Trigeminal neuralgia  Possible seizure disorder  History of PUD/GERD  MDD   Chronic diarrhea due to collagenous colitis.   Underweight,Body mass index is 16.64 kg/m .  Failure to thrive in adult  Moderate malnutrition    Clinically Significant Risk Factors     # Cachexia: Estimated body mass index is 16.11 kg/m  as calculated from the following:    Height as of this encounter: 1.676 m (5' 6\").    Weight as of this encounter: 45.3 kg (99 lb 12.8 oz).  # Moderate Malnutrition: based on nutrition assessment      Follow-ups Needed After Discharge   Follow-up Appointments     Follow Up and recommended labs and tests      Follow up with Nursing home physician.    Continue cefdinir, doxycycline and metronidazole as prescribed            Discharge Disposition   Discharged to short-term care facility  Condition at discharge: Stable    Hospital Course   Julisa Chaney is a 79 year old female with history of of trigeminal neuralgia, PUD, MDD, YUE, HLD, collagenous colitis and migraine headaches admitted on 8/11/2024 with aspiration pneumonia after episode of choking.     CT scan revealed mucous plugging and right lower lung consolidation. Nasal swab was positive for MRSA. She was placed on cefdinir, doxycycline and metronidazole (to be completed 8/24/2024) as recommended by ID specialist. Speech therapist recommended mild thickened liquid diet due to aspiration of thin liquids detected on video swallow study.    The patient is currently deconditioned and cachectic, requiring assistance with ambulation and transfers from bed to chair. She " is clinically stable to TCU at this time.     Consultations This Hospital Stay   PHYSICAL THERAPY ADULT IP CONSULT  OCCUPATIONAL THERAPY ADULT IP CONSULT  NUTRITION SERVICES ADULT IP CONSULT  SPEECH LANGUAGE PATH ADULT IP CONSULT  PHYSICAL THERAPY ADULT IP CONSULT  OCCUPATIONAL THERAPY ADULT IP CONSULT  PHARMACY TO DOSE VANCO  CARE MANAGEMENT / SOCIAL WORK IP CONSULT  PHARMACY TO DOSE VANCO  INFECTIOUS DISEASES IP CONSULT  PHYSICAL THERAPY ADULT IP CONSULT  OCCUPATIONAL THERAPY ADULT IP CONSULT    Code Status   Full Code    Time Spent on this Encounter   I, Tonja Pandya MD, personally saw the patient today and spent greater than 30 minutes discharging this patient.       Tonja Pandya MD  [address] [address]  Phone: [phone]  Fax: [phone]  ______________________________________________________________________    Physical Exam   Vital Signs: Temp: 98  F (36.7  C) Temp src: Oral BP: 107/53 Pulse: 68   Resp: 18 SpO2: 94 % O2 Device: None (Room air)    Weight: 99 lbs 12.8 oz      General appearance: Awake, Alert, Cooperative, not in any obvious distress and appears stated age   HEENT: Normocephalic, atraumatic, conjunctiva clear without icterus and ears without discharge  Lungs: Clear to auscultation bilaterally, no wheezing, diminished air exchange at the bases, normal work of breathing  Cardiovascular: Regular Rate and Rythm, normal apical impulse, normal S1 and S2, no lower extremity edema bilaterally  Abdomen: Soft, non-tender and Non-distended, active bowel sounds  Skin: Skin color, texture normal and bruising or bleeding. No rashes or lesions over face, neck, arms and legs, turgor normal.  Musculoskeletal: No bony deformities or joint tenderness. Normal ROM upon flexion & extension.   Neurologic: Alert & Oriented X 3, Facial symmetry preserved and upper & lower extremities moving well with symmetry  Psychiatric: Calm, normal eye  contact and normal affect         Primary Care Physician   Mara Murillo    Discharge Orders      General info for SNF    Length of Stay Estimate: Short Term Care: Estimated # of Days <30  Condition at Discharge: Stable  Level of care:skilled   Rehabilitation Potential: Good  Admission H&P remains valid and up-to-date: Yes  Recent Chemotherapy: N/A  Use Nursing Home Standing Orders: Yes     Follow Up and recommended labs and tests    Follow up with Nursing home physician.    Continue cefdinir, doxycycline and metronidazole as prescribed     Reason for your hospital stay    Sepsis due to aspiration pneumonia  Acute respiratory failure with hypoxia  Hypotension, resolved  Chronic pain syndrome due to trigeminal neuralgia  Trigeminal neuralgia  Possible seizure disorder  History of PUD/GERD  MDD   Chronic diarrhea due to collagenous colitis.   Underweight,Body mass index is 16.64 kg/m .  Failure to thrive in adult  Moderate malnutrition     Activity - Up with nursing assistance     Physical Therapy Adult Consult    Evaluate and treat as clinically indicated.    Reason:  deconditioning     Occupational Therapy Adult Consult    Evaluate and treat as clinically indicated.    Reason:  deconditioning     Fall precautions     Diet    Follow this diet upon discharge: Orders Placed This Encounter      Snacks/Supplements Adult: Gelatein Plus; Between Meals      Snacks/Supplements Adult: Magic Cup; Between Meals      Combination Diet Easy to Chew (level 7); Mildly Thick (level 2)       Significant Results and Procedures   Results for orders placed or performed during the hospital encounter of 08/11/24   Chest CT w/o contrast    Narrative    EXAM: CT CHEST WITHOUT CONTRAST  LOCATION: [facility]  DATE: 08/11/2024    INDICATION: Choking episode with pills. Not short of breath.  Evaluate for any obvious foreign body. Evaluate if consolidation for aspiration pneumonia. Patient is allergic to  contrast.  COMPARISON: None.  TECHNIQUE: CT chest without IV contrast. Multiplanar reformats were obtained. Dose reduction techniques were used.  CONTRAST: None.    FINDINGS:   LUNGS AND PLEURA: Moderate bronchial wall thickening with fairly extensive mucous plugging on the right with postobstructive atelectasis and/or infiltrate. Minimal atelectasis and/or infiltrate in the lingula. No effusions. No definite foreign body   demonstrated in the central airways.    MEDIASTINUM/AXILLAE: A few mildly prominent lymph nodes are noted which are nonspecific and likely reactive in this setting.    CORONARY ARTERY CALCIFICATION: Severe.    UPPER ABDOMEN: No acute findings.    MUSCULOSKELETAL: No frankly destructive bony lesions.      Impression    IMPRESSION:   1.  No definite foreign body demonstrated in the central airways.  2.  Moderate bronchial wall thickening, extensive mucous plugging and airspace disease on the right.       XR Video Swallow with SLP or OT    Narrative    EXAM: XR VIDEO SWALLOW WITH SLP OR OT  LOCATION: [facility]  DATE: 8/12/2024    INDICATION: Difficulty swallowing.  COMPARISON: None.    TECHNIQUE: Routine swallow study with speech pathology using multiple barium thicknesses.    RADIATION DOSE: Dose Area Product 8.4 mGy-cm2    FINDINGS:   Swallow study with Speech Pathology using multiple barium thicknesses.     With thin barium, there was an episode of mild penetration during chin tuck maneuver. Without chin thick maneuver, there was an episode of silent aspiration.    No penetration or aspiration with nectar consistency.    There was penetration with puree consistency. After deep cough, patient was able to clear some of the puree within the pharynx. There was no aspiration.      Impression    IMPRESSION:  1.  Penetration of thin liquid during chin tuck maneuver.  2.  Silent aspiration of thin liquid without chin tuck maneuver.  3.  Penetration with puree consistency.        Discharge Medications   Current Discharge Medication List        START taking these medications    Details   cefdinir (OMNICEF) 300 MG capsule Take 1 capsule (300 mg) by mouth every 12 hours for 10 doses    Associated Diagnoses: Aspiration pneumonia of right lung, unspecified aspiration pneumonia type, unspecified part of lung (H); Acute respiratory failure with hypoxia (H)      doxycycline hyclate (VIBRAMYCIN) 100 MG capsule Take 1 capsule (100 mg) by mouth every 12 hours for 10 doses    Associated Diagnoses: Aspiration pneumonia of right lung, unspecified aspiration pneumonia type, unspecified part of lung (H); Acute respiratory failure with hypoxia (H)      guaiFENesin (MUCINEX) 600 MG 12 hr tablet Take 2 tablets (1,200 mg) by mouth 2 times daily as needed for congestion    Associated Diagnoses: Aspiration pneumonia of right lung, unspecified aspiration pneumonia type, unspecified part of lung (H); Acute respiratory failure with hypoxia (H)      loperamide (IMODIUM) 2 MG capsule Take 1 capsule (2 mg) by mouth 4 times daily as needed for diarrhea    Associated Diagnoses: Aspiration pneumonia of right lung, unspecified aspiration pneumonia type, unspecified part of lung (H); Acute respiratory failure with hypoxia (H)      melatonin 3 MG tablet Take 1 tablet (3 mg) by mouth nightly as needed for sleep    Associated Diagnoses: Aspiration pneumonia of right lung, unspecified aspiration pneumonia type, unspecified part of lung (H); Acute respiratory failure with hypoxia (H)      metroNIDAZOLE (FLAGYL) 500 MG tablet Take 1 tablet (500 mg) by mouth 2 times daily for 5 doses    Associated Diagnoses: Aspiration pneumonia of right lung, unspecified aspiration pneumonia type, unspecified part of lung (H); Acute respiratory failure with hypoxia (H)      ondansetron (ZOFRAN ODT) 4 MG ODT tab Take 1 tablet (4 mg) by mouth every 6 hours as needed for nausea or vomiting    Associated Diagnoses: Aspiration pneumonia of right  lung, unspecified aspiration pneumonia type, unspecified part of lung (H); Acute respiratory failure with hypoxia (H)      pantoprazole (PROTONIX) 40 MG EC tablet Take 1 tablet (40 mg) by mouth every morning (before breakfast)    Associated Diagnoses: Aspiration pneumonia of right lung, unspecified aspiration pneumonia type, unspecified part of lung (H); Acute respiratory failure with hypoxia (H)      psyllium (METAMUCIL/KONSYL) Packet Take 1 packet by mouth daily    Associated Diagnoses: Aspiration pneumonia of right lung, unspecified aspiration pneumonia type, unspecified part of lung (H); Acute respiratory failure with hypoxia (H)      senna-docusate (SENOKOT-S/PERICOLACE) 8.6-50 MG tablet Take 1 tablet by mouth 2 times daily as needed for constipation    Associated Diagnoses: Aspiration pneumonia of right lung, unspecified aspiration pneumonia type, unspecified part of lung (H); Acute respiratory failure with hypoxia (H)           CONTINUE these medications which have CHANGED    Details   !! acetaminophen-codeine (TYLENOL #3) 300-30 MG per tablet Take 1 tablet by mouth every 6 hours as needed for severe pain    Associated Diagnoses: Aspiration pneumonia of right lung, unspecified aspiration pneumonia type, unspecified part of lung (H); Acute respiratory failure with hypoxia (H)      !! acetaminophen-codeine (TYLENOL #3) 300-30 MG per tablet Take 1 tablet by mouth every 6 hours as needed for severe pain  Qty: 12 tablet, Refills: 0    Associated Diagnoses: Aspiration pneumonia of right lung, unspecified aspiration pneumonia type, unspecified part of lung (H); Acute respiratory failure with hypoxia (H)       !! - Potential duplicate medications found. Please discuss with provider.        CONTINUE these medications which have NOT CHANGED    Details   ferrous sulfate (FEROSUL) 325 (65 Fe) MG tablet Take 1 tablet (325 mg) by mouth daily (with breakfast)  Qty: 90 tablet, Refills: 1    Associated Diagnoses: Iron  deficiency anemia due to chronic blood loss      !! gabapentin (NEURONTIN) 100 MG capsule Take 100 mg by mouth daily      !! gabapentin (NEURONTIN) 100 MG capsule Take 300 mg by mouth at bedtime      levETIRAcetam (KEPPRA) 750 MG tablet Take 1 tablet (750 mg) by mouth 2 times daily  Qty: 60 tablet, Refills: 0    Associated Diagnoses: Persistent insomnia      menthol-zinc oxide (CALMOSEPTINE) 0.44-20.6 % OINT ointment Apply topically 4 times daily as needed for skin protection (for sores on buttox from sitting)      METAMUCIL FIBER PO Take 1 capsule by mouth daily      mirtazapine (REMERON) 15 MG tablet Take 1 tablet (15 mg) by mouth at bedtime  Qty: 30 tablet, Refills: 0    Associated Diagnoses: Current mild episode of major depressive disorder without prior episode (H24)      Multiple Vitamin (MULTIVITAMIN) TABS TAKE 1 TABLET BY MOUTH EVERY DAY  Qty: 100 tablet, Refills: 3    Associated Diagnoses: Trigeminal nerve disorder      nortriptyline (PAMELOR) 50 MG capsule Take 1 capsule (50 mg) by mouth at bedtime  Qty: 90 capsule, Refills: 3    Associated Diagnoses: Trigeminal neuralgia; Chronic pain syndrome      OXcarbazepine (TRILEPTAL) 150 MG tablet TAKE 3 TABLETS(450 MG) BY MOUTH DAILY  Qty: 270 tablet, Refills: 1    Associated Diagnoses: Facial neuralgia      potassium chloride elin ER (KLOR-CON M20) 20 MEQ CR tablet Take 1 tablet (20 mEq) by mouth daily  Qty: 90 tablet, Refills: 1    Associated Diagnoses: Anxiety      QUEtiapine (SEROQUEL) 50 MG tablet Take 1 tablet (50 mg) by mouth at bedtime  Qty: 30 tablet, Refills: 0    Associated Diagnoses: Anxiety      topiramate (TOPAMAX) 50 MG tablet Take 1 tablet (50 mg) by mouth at bedtime  Qty: 30 tablet, Refills: 0    Associated Diagnoses: Intractable chronic migraine without aura and without status migrainosus      Vitamin D3 50 mcg (2000 units) tablet Take 1 tablet (50 mcg) by mouth daily  Qty: 90 tablet, Refills: 1    Associated Diagnoses: Vitamin D deficiency        !! - Potential duplicate medications found. Please discuss with provider.        Allergies   Allergies   Allergen Reactions    Contrast [Iohexol] Rash     Noticed during 08/2020 admission. Received IV contrast on 8/5    Chocolate Headache    Contrast Dye Rash    Penicillins Rash     Tolerated 8 week course of Augmentin in 2015

## 2024-08-20 NOTE — PLAN OF CARE
Goal Outcome Evaluation:      Plan of Care Reviewed With: patient    Pain control adequate with scheduled medications.  O2 sats stable on room air. Needs reminders and cues on using IS and flutter valve. Crackles noted in both lungs upon auscultation.   She wouldn't sit in the chair for dinner, but she did ambulate in her room with assist of one twice this shift.    Reviewed plan of care.   Park Encarnacion RN  [phone]

## 2024-08-20 NOTE — PLAN OF CARE
Occupational Therapy Discharge Summary    Reason for therapy discharge:    Discharged to TCU    Progress towards therapy goal(s). See goals on Care Plan in [facility] electronic health record for goal details.  Progressing towards goals.    Therapy recommendation(s):    Recommend continued OT @ TCU.

## 2024-08-21 ENCOUNTER — TRANSITIONAL CARE UNIT VISIT (OUTPATIENT)
Dept: GERIATRICS | Facility: CLINIC | Age: 79
End: 2024-08-21
Payer: COMMERCIAL

## 2024-08-21 ENCOUNTER — DOCUMENTATION ONLY (OUTPATIENT)
Dept: GERIATRICS | Facility: CLINIC | Age: 79
End: 2024-08-21

## 2024-08-21 ENCOUNTER — PATIENT OUTREACH (OUTPATIENT)
Dept: CARE COORDINATION | Facility: CLINIC | Age: 79
End: 2024-08-21

## 2024-08-21 DIAGNOSIS — R62.7 ADULT FAILURE TO THRIVE: Primary | ICD-10-CM

## 2024-08-21 DIAGNOSIS — G89.4 CHRONIC PAIN SYNDROME: ICD-10-CM

## 2024-08-21 DIAGNOSIS — J69.0 ASPIRATION PNEUMONIA OF RIGHT LUNG, UNSPECIFIED ASPIRATION PNEUMONIA TYPE, UNSPECIFIED PART OF LUNG (H): ICD-10-CM

## 2024-08-21 DIAGNOSIS — G40.909 SEIZURE DISORDER (H): ICD-10-CM

## 2024-08-21 DIAGNOSIS — J96.01 ACUTE RESPIRATORY FAILURE WITH HYPOXIA (H): ICD-10-CM

## 2024-08-21 DIAGNOSIS — R13.10 DYSPHAGIA, UNSPECIFIED TYPE: ICD-10-CM

## 2024-08-21 PROCEDURE — 99309 SBSQ NF CARE MODERATE MDM 30: CPT | Performed by: NURSE PRACTITIONER

## 2024-08-21 RX ORDER — ACETAMINOPHEN AND CODEINE PHOSPHATE 300; 30 MG/1; MG/1
1 TABLET ORAL EVERY 6 HOURS PRN
Qty: 120 TABLET | Refills: 0 | Status: SHIPPED | OUTPATIENT
Start: 2024-08-21 | End: 2024-09-09

## 2024-08-21 NOTE — LETTER
8/21/2024      Julisa Chaney  [address]  [place] [address]        [facility]    PRIMARY CARE PROVIDER AND CLINIC:  Mara Murillo MD, [address] [facility] [address]  Chief Complaint   Patient presents with     DENG      [facility] Record Number:  [id number]  Place of Service where encounter took place:  [facility] ([facility]) [[id number]]    Julisa Chaney  is a 79 year old  (1945), admitted to the above facility from  [facility]. Hospital stay 8/11/24 through 8/20/24. Patient is well known to provider and TCU staff due to frequent stays here. PMH trigeminal neuralgia, collagenous colitis, cognitive impairment, malnutrition, PUD/GERD, and depression. She was admitted with aspiration pneumonia after an episode of choking. SLP recommended mildly thickened liquids based on video swallow study.     HPI obtained from patient visit, review of nursing home record, discussion with facility staff, and Epic review.     HPI:    HPI is limited today. She is upset due to not having Tylenol #3 available until moments ago. She takes this every day, 4 times a day for her chronic facial pain. She says she is very tired now. Other than the pain medication issue, nursing is not aware of any concerns      CODE STATUS/ADVANCE DIRECTIVES DISCUSSION:  Full Code    ALLERGIES:   Allergies   Allergen Reactions     Contrast [Iohexol] Rash     Noticed during 08/2020 admission. Received IV contrast on 8/5     Chocolate Headache     Contrast Dye Rash     Penicillins Rash     Tolerated 8 week course of Augmentin in 2015      PAST MEDICAL HISTORY:   Past Medical History:   Diagnosis Date     Aspiration pneumonia (H) 02/2022     Depression      High cholesterol      Migraines      Pyloric ulcer, chronic      Sepsis (H) 08/10/2020     Trigeminal neuralgia       PAST SURGICAL HISTORY:   has a past surgical history that includes Colonoscopy w/wo Brush **Performed** (N/A,  8/13/2020); Pr Esophagogastroduodenoscopy Transoral Diagnostic (N/A, 8/13/2020); Hysterectomy; Pr Esophagogastroduodenoscopy Transoral Diagnostic (N/A, 8/14/2020); PICC/Midline Placement (7/22/2022); Ventriculostomy (Left, 7/31/2022); IR Gastrostomy Tube Percutaneous Plcmnt (8/16/2022); and Colonoscopy (N/A, 1/19/2024).  FAMILY HISTORY: family history includes Breast Cancer in her maternal aunt, mother, sister, and sister; Cancer in her father; Testicular cancer (age of onset: 17.00) in her grandchild.  SOCIAL HISTORY:   reports that she quit smoking about 9 years ago. She started smoking about 59 years ago. She has a 50 pack-year smoking history. She has never used smokeless tobacco. She reports that she does not drink alcohol and does not use drugs.  Patient's living condition: lives alone    Post Discharge Medication Reconciliation Status:   MED REC REQUIRED  Post Medication Reconciliation Status:  Discharge medications reconciled, continue medications without change       Current Outpatient Medications   Medication Sig Dispense Refill     acetaminophen-codeine (TYLENOL #3) 300-30 MG per tablet Take 1 tablet by mouth every 6 hours as needed for severe pain. 120 tablet 0     cefdinir (OMNICEF) 300 MG capsule Take 1 capsule (300 mg) by mouth every 12 hours for 10 doses       doxycycline hyclate (VIBRAMYCIN) 100 MG capsule Take 1 capsule (100 mg) by mouth every 12 hours for 10 doses       ferrous sulfate (FEROSUL) 325 (65 Fe) MG tablet Take 1 tablet (325 mg) by mouth daily (with breakfast) 90 tablet 1     gabapentin (NEURONTIN) 100 MG capsule Take 100 mg by mouth daily       gabapentin (NEURONTIN) 100 MG capsule Take 300 mg by mouth at bedtime       guaiFENesin (MUCINEX) 600 MG 12 hr tablet Take 2 tablets (1,200 mg) by mouth 2 times daily as needed for congestion       levETIRAcetam (KEPPRA) 750 MG tablet Take 1 tablet (750 mg) by mouth 2 times daily 60 tablet 0     loperamide (IMODIUM) 2 MG capsule Take 1 capsule  (2 mg) by mouth 4 times daily as needed for diarrhea       melatonin 3 MG tablet Take 1 tablet (3 mg) by mouth nightly as needed for sleep       menthol-zinc oxide (CALMOSEPTINE) 0.44-20.6 % OINT ointment Apply topically 4 times daily as needed for skin protection (for sores on buttox from sitting)       METAMUCIL FIBER PO Take 1 capsule by mouth daily       metroNIDAZOLE (FLAGYL) 500 MG tablet Take 1 tablet (500 mg) by mouth 2 times daily 10 tablet 0     mirtazapine (REMERON) 15 MG tablet Take 1 tablet (15 mg) by mouth at bedtime 30 tablet 0     Multiple Vitamin (MULTIVITAMIN) TABS TAKE 1 TABLET BY MOUTH EVERY  tablet 3     nortriptyline (PAMELOR) 50 MG capsule Take 1 capsule (50 mg) by mouth at bedtime 90 capsule 3     ondansetron (ZOFRAN ODT) 4 MG ODT tab Take 1 tablet (4 mg) by mouth every 6 hours as needed for nausea or vomiting       OXcarbazepine (TRILEPTAL) 150 MG tablet TAKE 3 TABLETS(450 MG) BY MOUTH DAILY 270 tablet 1     pantoprazole (PROTONIX) 40 MG EC tablet Take 1 tablet (40 mg) by mouth every morning (before breakfast)       potassium chloride elin ER (KLOR-CON M20) 20 MEQ CR tablet Take 1 tablet (20 mEq) by mouth daily 90 tablet 1     QUEtiapine (SEROQUEL) 50 MG tablet Take 1 tablet (50 mg) by mouth at bedtime 30 tablet 0     senna-docusate (SENOKOT-S/PERICOLACE) 8.6-50 MG tablet Take 1 tablet by mouth 2 times daily as needed for constipation       topiramate (TOPAMAX) 50 MG tablet Take 1 tablet (50 mg) by mouth at bedtime 30 tablet 0     Vitamin D3 50 mcg (2000 units) tablet Take 1 tablet (50 mcg) by mouth daily 90 tablet 1     No current facility-administered medications for this visit.       ROS:  Limited secondary to cognitive impairment/fatigue    Exam:  GENERAL APPEARANCE:  Drowsy, in no distress, cachectic  RESP:  no respiratory distress  CV:  no edema  PSYCH:  memory impaired , affect abnormal flat    Lab/Diagnostic data:  Recent labs in [facility] reviewed by me today.      ASSESSMENT/PLAN:  (R62.7) Adult failure to thrive  (primary encounter diagnosis)  Comment: Patient with several hospitalizations in the past year. She is exhibiting slow, progressive decline. The indicates a need for a higher level of care. Discharge home has not been recommended during previous stays and is unlikely to be recommended now.   Plan: PT/OT eval and treat, discharge planning per their recommendations.    (R13.10) Dysphagia, unspecified type  Comment: New diagnosis. This may improve with therapy  Plan: ST eval and treat. Diet per their recommendation    (G89.4) Chronic pain syndrome  Comment: Chronic condition being managed with medications.  Plan: Continue current POC with no changes at this time. Script for Tylenol #3 sent to insure that supply does not run out    (G40.829) Seizure disorder (H)  Comment: Chronic condition being managed with medications.  Plan: Continue current POC with no changes at this time and adjustments as needed.      Electronically signed by:  DANIELLE Villanueva CNP                     Sincerely,        DANIELLE Villanueva CNP

## 2024-08-21 NOTE — PROGRESS NOTES
Writer called Emily at [phone], option 1, option 8, option 1, option 2 to provide an update regarding discharge disposition.     Rajwinder Mathis RN

## 2024-08-21 NOTE — LETTER
Hand-off  for Care Coordination    Att: Discharge Planner:  Please if able, fax or call the number listed below when the below patient is discharged from your facility.  Clinic Care Coordination from patient's Primary Care Clinic will outreach to patient upon discharge to assist with TCU transition/discharge.    Thank you      Patient Name:   Julisa Chaney  Patient :     1945  Patient PCP:     Mara Murillo MD    Patient Primary Clinic:   [address] [place] [id number]  D/C Facility: _____________________________________________   TCU Contact Info for questions: ___________________________  D/C Date:  ______________________________________________  Follow-up Apt with PCP after TCU D/C:   ____________________  Other Follow-up Apt s:      _________________________________________  Additional information (concerns, and Home Care, ect ):   __________________________________________________________________  __________________________________________________________________  Care Coordinator to Contact at [place]  Fax to: [phone]  Attn:  Aleshia Moraes RN Clinic Care Coordination [facility]  Phone: [phone]       Request for Banophen DENIED.    Refills on file at requested pharmacy/or too soon.    Next appt 2/10/2023

## 2024-08-21 NOTE — PROGRESS NOTES
[facility] GERIATRICS    PRIMARY CARE PROVIDER AND CLINIC:  Mara Murillo MD, [address] [facility] / [facility] [address]  Chief Complaint   Patient presents with    [facility] Medical Record Number:  [id number]  Place of Service where encounter took place:  [facility] ([facility] [[id number]]    Julisa Chaney  is a 79 year old  (1945), admitted to the above facility from  [facility]. Hospital stay 8/11/24 through 8/20/24. Patient is well known to provider and TCU staff due to frequent stays here. PMH trigeminal neuralgia, collagenous colitis, cognitive impairment, malnutrition, PUD/GERD, and depression. She was admitted with aspiration pneumonia after an episode of choking. SLP recommended mildly thickened liquids based on video swallow study.     HPI obtained from patient visit, review of nursing home record, discussion with facility staff, and Epic review.     HPI:    HPI is limited today. She is upset due to not having Tylenol #3 available until moments ago. She takes this every day, 4 times a day for her chronic facial pain. She says she is very tired now. Other than the pain medication issue, nursing is not aware of any concerns      CODE STATUS/ADVANCE DIRECTIVES DISCUSSION:  Full Code    ALLERGIES:   Allergies   Allergen Reactions    Contrast [Iohexol] Rash     Noticed during 08/2020 admission. Received IV contrast on 8/5    Chocolate Headache    Contrast Dye Rash    Penicillins Rash     Tolerated 8 week course of Augmentin in 2015      PAST MEDICAL HISTORY:   Past Medical History:   Diagnosis Date    Aspiration pneumonia (H) 02/2022    Depression     High cholesterol     Migraines     Pyloric ulcer, chronic     Sepsis (H) 08/10/2020    Trigeminal neuralgia       PAST SURGICAL HISTORY:   has a past surgical history that includes Colonoscopy w/wo Brush **Performed** (N/A, 8/13/2020); Pr Esophagogastroduodenoscopy Transoral Diagnostic (N/A, 8/13/2020); Hysterectomy;  Pr Esophagogastroduodenoscopy Transoral Diagnostic (N/A, 8/14/2020); PICC/Midline Placement (7/22/2022); Ventriculostomy (Left, 7/31/2022); IR Gastrostomy Tube Percutaneous Plcmnt (8/16/2022); and Colonoscopy (N/A, 1/19/2024).  FAMILY HISTORY: family history includes Breast Cancer in her maternal aunt, mother, sister, and sister; Cancer in her father; Testicular cancer (age of onset: 17.00) in her grandchild.  SOCIAL HISTORY:   reports that she quit smoking about 9 years ago. She started smoking about 59 years ago. She has a 50 pack-year smoking history. She has never used smokeless tobacco. She reports that she does not drink alcohol and does not use drugs.  Patient's living condition: lives alone    Post Discharge Medication Reconciliation Status:   MED REC REQUIRED  Post Medication Reconciliation Status:  Discharge medications reconciled, continue medications without change       Current Outpatient Medications   Medication Sig Dispense Refill    acetaminophen-codeine (TYLENOL #3) 300-30 MG per tablet Take 1 tablet by mouth every 6 hours as needed for severe pain. 120 tablet 0    cefdinir (OMNICEF) 300 MG capsule Take 1 capsule (300 mg) by mouth every 12 hours for 10 doses      doxycycline hyclate (VIBRAMYCIN) 100 MG capsule Take 1 capsule (100 mg) by mouth every 12 hours for 10 doses      ferrous sulfate (FEROSUL) 325 (65 Fe) MG tablet Take 1 tablet (325 mg) by mouth daily (with breakfast) 90 tablet 1    gabapentin (NEURONTIN) 100 MG capsule Take 100 mg by mouth daily      gabapentin (NEURONTIN) 100 MG capsule Take 300 mg by mouth at bedtime      guaiFENesin (MUCINEX) 600 MG 12 hr tablet Take 2 tablets (1,200 mg) by mouth 2 times daily as needed for congestion      levETIRAcetam (KEPPRA) 750 MG tablet Take 1 tablet (750 mg) by mouth 2 times daily 60 tablet 0    loperamide (IMODIUM) 2 MG capsule Take 1 capsule (2 mg) by mouth 4 times daily as needed for diarrhea      melatonin 3 MG tablet Take 1 tablet (3 mg) by  mouth nightly as needed for sleep      menthol-zinc oxide (CALMOSEPTINE) 0.44-20.6 % OINT ointment Apply topically 4 times daily as needed for skin protection (for sores on buttox from sitting)      METAMUCIL FIBER PO Take 1 capsule by mouth daily      metroNIDAZOLE (FLAGYL) 500 MG tablet Take 1 tablet (500 mg) by mouth 2 times daily 10 tablet 0    mirtazapine (REMERON) 15 MG tablet Take 1 tablet (15 mg) by mouth at bedtime 30 tablet 0    Multiple Vitamin (MULTIVITAMIN) TABS TAKE 1 TABLET BY MOUTH EVERY  tablet 3    nortriptyline (PAMELOR) 50 MG capsule Take 1 capsule (50 mg) by mouth at bedtime 90 capsule 3    ondansetron (ZOFRAN ODT) 4 MG ODT tab Take 1 tablet (4 mg) by mouth every 6 hours as needed for nausea or vomiting      OXcarbazepine (TRILEPTAL) 150 MG tablet TAKE 3 TABLETS(450 MG) BY MOUTH DAILY 270 tablet 1    pantoprazole (PROTONIX) 40 MG EC tablet Take 1 tablet (40 mg) by mouth every morning (before breakfast)      potassium chloride elin ER (KLOR-CON M20) 20 MEQ CR tablet Take 1 tablet (20 mEq) by mouth daily 90 tablet 1    QUEtiapine (SEROQUEL) 50 MG tablet Take 1 tablet (50 mg) by mouth at bedtime 30 tablet 0    senna-docusate (SENOKOT-S/PERICOLACE) 8.6-50 MG tablet Take 1 tablet by mouth 2 times daily as needed for constipation      topiramate (TOPAMAX) 50 MG tablet Take 1 tablet (50 mg) by mouth at bedtime 30 tablet 0    Vitamin D3 50 mcg (2000 units) tablet Take 1 tablet (50 mcg) by mouth daily 90 tablet 1     No current facility-administered medications for this visit.       ROS:  Limited secondary to cognitive impairment/fatigue    Exam:  GENERAL APPEARANCE:  Drowsy, in no distress, cachectic  RESP:  no respiratory distress  CV:  no edema  PSYCH:  memory impaired , affect abnormal flat    Lab/Diagnostic data:  Recent labs in [facility] reviewed by me today.     ASSESSMENT/PLAN:  (R62.7) Adult failure to thrive  (primary encounter diagnosis)  Comment: Patient with several hospitalizations in the  past year. She is exhibiting slow, progressive decline. The indicates a need for a higher level of care. Discharge home has not been recommended during previous stays and is unlikely to be recommended now.   Plan: PT/OT eval and treat, discharge planning per their recommendations.    (R13.10) Dysphagia, unspecified type  Comment: New diagnosis. This may improve with therapy  Plan: ST eval and treat. Diet per their recommendation    (G89.4) Chronic pain syndrome  Comment: Chronic condition being managed with medications.  Plan: Continue current POC with no changes at this time. Script for Tylenol #3 sent to insure that supply does not run out    (G40.909) Seizure disorder (H)  Comment: Chronic condition being managed with medications.  Plan: Continue current POC with no changes at this time and adjustments as needed.      Electronically signed by:  DANIELLE Villanueva CNP

## 2024-08-21 NOTE — PLAN OF CARE
Physical Therapy Discharge Summary    Reason for therapy discharge:    Discharged to transitional care facility.    Progress towards therapy goal(s). See goals on Care Plan in [facility] electronic health record for goal details.  Goals not met.  Barriers to achieving goals:   discharge from facility.    Therapy recommendation(s):    Continued therapy is recommended.  Rationale/Recommendations:  to improve functional mobility.

## 2024-08-21 NOTE — PROGRESS NOTES
"Clinic Care Coordination Contact  Care Coordination Transition Communication    Clinical Data: Patient was hospitalized at [facility] from 8/11/24 to 8/20/24 with diagnosis of Sepsis due to aspiration pneumonia  Acute respiratory failure with hypoxia  Hypotension, resolved  Chronic pain syndrome due to trigeminal neuralgia  Trigeminal neuralgia  Possible seizure disorder  History of PUD/GERD  MDD   Chronic diarrhea due to collagenous colitis.   Underweight,Body mass index is 16.64 kg/m .  Failure to thrive in adult  Moderate malnutrition.     Assessment: Patient has transitioned to TCU/ARU for short term rehabilitation:    Facility Name: [facility]   Transition Communication:  Notified facility of Primary Care- Care Coordination support via Epic fax.    Plan: Care Coordinator will await notification from facility staff informing of patient's discharge plans/needs. Care Coordinator will review chart and outreach to facility staff every 4 weeks and as needed.     Per hospital chart review: Patient lives in a wheelchair accessible house alone. Daughter Alissa comes over at least 3 times a day to prepare meals and takes care of household. Patient reports she is able to bathe and get dressed on her own. Patient uses wheelchair mostly and sometimes is able to use walker. No home care currently and at this time patient is declining home care as she felt it \"didn't do much\".   "

## 2024-08-28 ENCOUNTER — TRANSITIONAL CARE UNIT VISIT (OUTPATIENT)
Dept: GERIATRICS | Facility: CLINIC | Age: 79
End: 2024-08-28
Payer: COMMERCIAL

## 2024-08-28 VITALS
WEIGHT: 96.2 LBS | OXYGEN SATURATION: 92 % | BODY MASS INDEX: 15.46 KG/M2 | HEIGHT: 66 IN | HEART RATE: 56 BPM | TEMPERATURE: 97.2 F | DIASTOLIC BLOOD PRESSURE: 50 MMHG | SYSTOLIC BLOOD PRESSURE: 107 MMHG | RESPIRATION RATE: 16 BRPM

## 2024-08-28 DIAGNOSIS — J96.01 ACUTE RESPIRATORY FAILURE WITH HYPOXIA (H): ICD-10-CM

## 2024-08-28 DIAGNOSIS — R53.81 PHYSICAL DECONDITIONING: ICD-10-CM

## 2024-08-28 DIAGNOSIS — R13.10 DYSPHAGIA, UNSPECIFIED TYPE: ICD-10-CM

## 2024-08-28 DIAGNOSIS — K59.01 SLOW TRANSIT CONSTIPATION: ICD-10-CM

## 2024-08-28 DIAGNOSIS — D50.9 IRON DEFICIENCY ANEMIA, UNSPECIFIED IRON DEFICIENCY ANEMIA TYPE: ICD-10-CM

## 2024-08-28 DIAGNOSIS — G50.0 TRIGEMINAL NEURALGIA: ICD-10-CM

## 2024-08-28 DIAGNOSIS — J69.0 ASPIRATION PNEUMONIA OF RIGHT LUNG, UNSPECIFIED ASPIRATION PNEUMONIA TYPE, UNSPECIFIED PART OF LUNG (H): ICD-10-CM

## 2024-08-28 DIAGNOSIS — F33.0 MILD RECURRENT MAJOR DEPRESSION (H): ICD-10-CM

## 2024-08-28 DIAGNOSIS — R62.7 FAILURE TO THRIVE IN ADULT: ICD-10-CM

## 2024-08-28 DIAGNOSIS — G40.909 SEIZURE DISORDER (H): ICD-10-CM

## 2024-08-28 DIAGNOSIS — J69.0 ASPIRATION PNEUMONIA OF RIGHT LOWER LOBE, UNSPECIFIED ASPIRATION PNEUMONIA TYPE (H): Primary | ICD-10-CM

## 2024-08-28 PROCEDURE — 99305 1ST NF CARE MODERATE MDM 35: CPT | Performed by: INTERNAL MEDICINE

## 2024-08-28 RX ORDER — PANTOPRAZOLE SODIUM 40 MG/1
TABLET, DELAYED RELEASE ORAL
Qty: 90 TABLET | OUTPATIENT
Start: 2024-08-28

## 2024-08-28 RX ORDER — OXCARBAZEPINE 300 MG/1
TABLET, FILM COATED ORAL
Qty: 90 TABLET | OUTPATIENT
Start: 2024-08-28

## 2024-08-28 NOTE — PROGRESS NOTES
[facility] GERIATRICS  INITIAL VISIT NOTE  August 28, 2024      PRIMARY CARE PROVIDER AND CLINIC:  [facility]ara Murillo [address] [facility] [address]    [facility] Medical Record Number:  [id number]  Place of Service where encounter took place:  [facility] [[id number]]    Chief Complaint   Patient presents with    Hospital F/U       HPI:    Julisa Chaney is a 79 year old  (1945[facility] Medical history is notable for brain abscess s/p ventriculostomy (2022), seizure disorder, trigeminal neuralgia, chronic pain and chronic diarrhea. She was hospitalized at [facility] from 8/11/2024 to 8/20/2024 where she was admitted with a right lower lobe aspiration pneumonia and sepsis.  Completed course of antibiotics on 8/24/2023.  SLP evaluated and recommended mildly thick liquids. She was admitted to this facility for  rehab, medical management, and nursing care.      History obtained from: facility chart records, facility staff, patient report, and [facility] chart review.      Today, Ms. Gregorio is seen in her room sitting up in bed.  She is going to discharge home on 8/30/2024.  She feels she has made good progress with rehab and is much stronger.  Her goal is to be able to get down stairs and do laundry on her own.  With regard to the thickened liquids, she plans to talk with speech today.  She does not mind thickened juices, but the thickened water is not palatable and it makes it difficult for her to stay hydrated and that she does not feel as good as she could.  No chest pain.  No dyspnea.  No acute concerns today per discussion with nursing.  She is working with therapies.  At this time she does not wish to have home care, and we discussed she can talk with her PCP should that change when she gets home.    CODE STATUS: CPR/Full code     ALLERGIES:  Allergies   Allergen Reactions    Contrast [Iohexol] Rash     Noticed during 08/2020 admission.  Received IV contrast on 8/5    Chocolate Headache    Contrast Dye Rash    Penicillins Rash     Tolerated 8 week course of Augmentin in 2015       PAST MEDICAL HISTORY:   Past Medical History:   Diagnosis Date    Aspiration pneumonia (H) 02/2022    Depression     High cholesterol     Migraines     Pyloric ulcer, chronic     Sepsis (H) 08/10/2020    Trigeminal neuralgia        PAST SURGICAL HISTORY:   Past Surgical History:   Procedure Laterality Date    COLONOSCOPY N/A 1/19/2024    Procedure: COLONOSCOPY WITH RANDOM BIOPSIES;  Surgeon: Antonio Mcelroy MD, MD;  Location: [facility]    HYSTERECTOMY      IR GASTROSTOMY TUBE PERCUTANEOUS PLCMNT  8/16/2022    PICC TRIPLE LUMEN PLACEMENT  7/22/2022         [place] ESOPHAGOGASTRODUODENOSCOPY TRANSORAL DIAGNOSTIC N/A 8/13/2020    Procedure: ESOPHAGOGASTRODUODENOSCOPY (EGD);  Surgeon: Nathan Smalls MD;  Location: [facility];  Service: Gastroenterology    [place] ESOPHAGOGASTRODUODENOSCOPY TRANSORAL DIAGNOSTIC N/A 8/14/2020    Procedure: ESOPHAGOGASTRODUODENOSCOPY (EGD), PYLORIC DILATION;  Surgeon: Nathan Smalls MD;  Location: [facility];  Service: Gastroenterology    VENTRICULOSTOMY Left 7/31/2022    Procedure: LEFT FRONTAL VENTRICULOSTOMY;  Surgeon: Brady Heredia MD;  Location: [facility] COLONOSCOPY W/WO BRUSH/WASH N/A 8/13/2020    Procedure: COLONOSCOPY WITH POLYPECTOMY;  Surgeon: Natahn Smalls MD;  Location: [facility];  Service: Gastroenterology       FAMILY HISTORY:   Family History   Problem Relation Age of Onset    Cancer Father     Testicular cancer Grandchild 17.00    Breast Cancer Mother     Breast Cancer Sister     Breast Cancer Maternal Aunt     Breast Cancer Sister        SOCIAL HISTORY:   Patient's living condition:  alone in a house    MEDICATIONS:  Post Discharge Medication Reconciliation Status: discharge medications reconciled and changed, per note/orders.  Current Outpatient Medications   Medication Sig Dispense Refill     acetaminophen-codeine (TYLENOL #3) 300-30 MG per tablet Take 1 tablet by mouth every 6 hours as needed for severe pain. 120 tablet 0    ferrous sulfate (FEROSUL) 325 (65 Fe) MG tablet Take 1 tablet (325 mg) by mouth daily (with breakfast) 90 tablet 1    gabapentin (NEURONTIN) 100 MG capsule Take 100 mg by mouth daily      gabapentin (NEURONTIN) 100 MG capsule Take 300 mg by mouth at bedtime      levETIRAcetam (KEPPRA) 750 MG tablet Take 1 tablet (750 mg) by mouth 2 times daily 60 tablet 0    loperamide (IMODIUM) 2 MG capsule Take 1 capsule (2 mg) by mouth 4 times daily as needed for diarrhea      melatonin 3 MG tablet Take 1 tablet (3 mg) by mouth nightly as needed for sleep      menthol-zinc oxide (CALMOSEPTINE) 0.44-20.6 % OINT ointment Apply topically 4 times daily as needed for skin protection (for sores on buttox from sitting)      METAMUCIL FIBER PO Take 1 capsule by mouth daily      mirtazapine (REMERON) 15 MG tablet Take 1 tablet (15 mg) by mouth at bedtime 30 tablet 0    Multiple Vitamin (MULTIVITAMIN) TABS TAKE 1 TABLET BY MOUTH EVERY  tablet 3    nortriptyline (PAMELOR) 50 MG capsule Take 1 capsule (50 mg) by mouth at bedtime 90 capsule 3    ondansetron (ZOFRAN ODT) 4 MG ODT tab Take 1 tablet (4 mg) by mouth every 6 hours as needed for nausea or vomiting      OXcarbazepine (TRILEPTAL) 150 MG tablet TAKE 3 TABLETS(450 MG) BY MOUTH DAILY 270 tablet 1    pantoprazole (PROTONIX) 40 MG EC tablet Take 1 tablet (40 mg) by mouth every morning (before breakfast)      potassium chloride eiln ER (KLOR-CON M20) 20 MEQ CR tablet Take 1 tablet (20 mEq) by mouth daily 90 tablet 1    QUEtiapine (SEROQUEL) 50 MG tablet Take 1 tablet (50 mg) by mouth at bedtime 30 tablet 0    senna-docusate (SENOKOT-S/PERICOLACE) 8.6-50 MG tablet Take 1 tablet by mouth 2 times daily as needed for constipation      topiramate (TOPAMAX) 50 MG tablet Take 1 tablet (50 mg) by mouth at bedtime 30 tablet 0    Vitamin D3 50 mcg (2000  "units) tablet Take 1 tablet (50 mcg) by mouth daily 90 tablet 1       ROS:  4point ROS neg other than the symptoms noted above in the HPI.    PHYSICAL EXAM:  /50   Pulse 56   Temp 97.2  F (36.2  C)   Resp 16   Ht 1.676 m (5' 6\")   Wt 43.6 kg (96 lb 3.2 oz)   SpO2 92%   BMI 15.53 kg/m    Gen: sitting up in bed, alert, cooperative and in no acute distress  Resp: breathing non labored, no tachypnea; declined lung exam - she feels well  Ext: no LE edema  Neuro: CX II-XII grossly in tact; ROM in all four extremities grossly in tact  Psych: alert and oriented x3; normal affect      LABORATORY/IMAGING DATA:  Reviewed as per [facility] and/or [facility]    ASSESSMENT/PLAN:    RLL Aspiration Pneumonia with Sepsis, Resolved  Completed course of antibiotics on 8/24/2023  -- SLP following    Dysphagia  SLP evaluated and recommended mildly thick liquids.  -- she would like to be able to drink thin cold water, otherwise is OK with thickened juice  -- SLP following - advance diet as per them    Trigeminal Neuralgia  Seizure Disorder   Chronic Pain Syndrome  History of Brain Abscess s/p Ventriculostomy (2022)  Baseline pain today, per her report  -- gabapentin 100 mg with breakfast and 300 mg at bedtime, nortriptyline 50 mg at bedtime, oxcarbazepine 450 mg daily and topiramate 50 mg at bedtime   -- levetiracetam 750 mg BID  -- APAP w/ codeine 1 tab q6h PRN    Fe Deficiency Anemia  Baseline Hgb 13 range earlier this year, more recently 10-11 range  -- FeSO4 325 mg daily      Hypokalemia  K 4.1 on 8/20. She is eating well, even with the thickened liquids  -- KCl 20 mEq daily     Mild Major Recurrent Depression  Mood and spirits were good today.   -- mirtazapine 15 mg at bedtime and quetiapine 50 mg at bedtime  -- supportive cares     Slow Transit Constipation  -- psyllium daily, Senna-S 1 tab BID PRN  -- adjust bowel regimen as needed    Physical Deconditioning  In setting of hospitalization and underlying medical " conditions  -- ongoing PT/OT  -- she declined home PT/OT    Electronically signed by:  Clementine Berumen MD

## 2024-08-28 NOTE — LETTER
8/28/2024      Julisa Chaney  [address]  [place] [address]        M [facility] GERIATRICS  INITIAL VISIT NOTE  August 28, 2024      PRIMARY CARE PROVIDER AND CLINIC:  Mara Murillo [address] / [place] [address]    M [facility] Medical Record Number:  [id number]  Place of Service where encounter took place:  [facility] ([facility]) [[id number]]    Chief Complaint   Patient presents with     Hospital F/U       HPI:    Julisa Chaney is a 79 year old  (1945) female [facility]. Medical history is notable for brain abscess s/p ventriculostomy (2022), seizure disorder, trigeminal neuralgia, chronic pain and chronic diarrhea. She was hospitalized at [facility] from 8/11/2024 to 8/20/2024 where she was admitted with a right lower lobe aspiration pneumonia and sepsis.  Completed course of antibiotics on 8/24/2023.  SLP evaluated and recommended mildly thick liquids. She was admitted to this facility for  rehab, medical management, and nursing care.      History obtained from: facility chart records, facility staff, patient report, and [facility] chart review.      Today, Ms. Gregorio is seen in her room sitting up in bed.  She is going to discharge home on 8/30/2024.  She feels she has made good progress with rehab and is much stronger.  Her goal is to be able to get down stairs and do laundry on her own.  With regard to the thickened liquids, she plans to talk with speech today.  She does not mind thickened juices, but the thickened water is not palatable and it makes it difficult for her to stay hydrated and that she does not feel as good as she could.  No chest pain.  No dyspnea.  No acute concerns today per discussion with nursing.  She is working with therapies.  At this time she does not wish to have home care, and we discussed she can talk with her PCP should that change when she gets home.    CODE STATUS: CPR/Full code     ALLERGIES:  Allergies    Allergen Reactions     Contrast [Iohexol] Rash     Noticed during 08/2020 admission. Received IV contrast on 8/5     Chocolate Headache     Contrast Dye Rash     Penicillins Rash     Tolerated 8 week course of Augmentin in 2015       PAST MEDICAL HISTORY:   Past Medical History:   Diagnosis Date     Aspiration pneumonia (H) 02/2022     Depression      High cholesterol      Migraines      Pyloric ulcer, chronic      Sepsis (H) 08/10/2020     Trigeminal neuralgia        PAST SURGICAL HISTORY:   Past Surgical History:   Procedure Laterality Date     COLONOSCOPY N/A 1/19/2024    Procedure: COLONOSCOPY WITH RANDOM BIOPSIES;  Surgeon: Antonio Mcelroy MD, MD;  Location: [facility]     HYSTERECTOMY       IR GASTROSTOMY TUBE PERCUTANEOUS PLCMNT  8/16/2022     PICC TRIPLE LUMEN PLACEMENT  7/22/2022          AR ESOPHAGOGASTRODUODENOSCOPY TRANSORAL DIAGNOSTIC N/A 8/13/2020    Procedure: ESOPHAGOGASTRODUODENOSCOPY (EGD);  Surgeon: Nathan Smalls MD;  Location: [facility];  Service: Gastroenterology     AR ESOPHAGOGASTRODUODENOSCOPY TRANSORAL DIAGNOSTIC N/A 8/14/2020    Procedure: ESOPHAGOGASTRODUODENOSCOPY (EGD), PYLORIC DILATION;  Surgeon: Nathan Smalls MD;  Location: [facility];  Service: Gastroenterology     VENTRICULOSTOMY Left 7/31/2022    Procedure: LEFT FRONTAL VENTRICULOSTOMY;  Surgeon: Brady Heredia MD;  Location: [facility] COLONOSCOPY W/WO BRUSH/WASH N/A 8/13/2020    Procedure: COLONOSCOPY WITH POLYPECTOMY;  Surgeon: Nathan Smalls MD;  Location: [facility];  Service: Gastroenterology       FAMILY HISTORY:   Family History   Problem Relation Age of Onset     Cancer Father      Testicular cancer Grandchild 17.00     Breast Cancer Mother      Breast Cancer Sister      Breast Cancer Maternal Aunt      Breast Cancer Sister        SOCIAL HISTORY:   Patient's living condition:  alone in a house    MEDICATIONS:  Post Discharge Medication Reconciliation Status: discharge  medications reconciled and changed, per note/orders.  Current Outpatient Medications   Medication Sig Dispense Refill     acetaminophen-codeine (TYLENOL #3) 300-30 MG per tablet Take 1 tablet by mouth every 6 hours as needed for severe pain. 120 tablet 0     ferrous sulfate (FEROSUL) 325 (65 Fe) MG tablet Take 1 tablet (325 mg) by mouth daily (with breakfast) 90 tablet 1     gabapentin (NEURONTIN) 100 MG capsule Take 100 mg by mouth daily       gabapentin (NEURONTIN) 100 MG capsule Take 300 mg by mouth at bedtime       levETIRAcetam (KEPPRA) 750 MG tablet Take 1 tablet (750 mg) by mouth 2 times daily 60 tablet 0     loperamide (IMODIUM) 2 MG capsule Take 1 capsule (2 mg) by mouth 4 times daily as needed for diarrhea       melatonin 3 MG tablet Take 1 tablet (3 mg) by mouth nightly as needed for sleep       menthol-zinc oxide (CALMOSEPTINE) 0.44-20.6 % OINT ointment Apply topically 4 times daily as needed for skin protection (for sores on buttox from sitting)       METAMUCIL FIBER PO Take 1 capsule by mouth daily       mirtazapine (REMERON) 15 MG tablet Take 1 tablet (15 mg) by mouth at bedtime 30 tablet 0     Multiple Vitamin (MULTIVITAMIN) TABS TAKE 1 TABLET BY MOUTH EVERY  tablet 3     nortriptyline (PAMELOR) 50 MG capsule Take 1 capsule (50 mg) by mouth at bedtime 90 capsule 3     ondansetron (ZOFRAN ODT) 4 MG ODT tab Take 1 tablet (4 mg) by mouth every 6 hours as needed for nausea or vomiting       OXcarbazepine (TRILEPTAL) 150 MG tablet TAKE 3 TABLETS(450 MG) BY MOUTH DAILY 270 tablet 1     pantoprazole (PROTONIX) 40 MG EC tablet Take 1 tablet (40 mg) by mouth every morning (before breakfast)       potassium chloride elin ER (KLOR-CON M20) 20 MEQ CR tablet Take 1 tablet (20 mEq) by mouth daily 90 tablet 1     QUEtiapine (SEROQUEL) 50 MG tablet Take 1 tablet (50 mg) by mouth at bedtime 30 tablet 0     senna-docusate (SENOKOT-S/PERICOLACE) 8.6-50 MG tablet Take 1 tablet by mouth 2 times daily as needed for  "constipation       topiramate (TOPAMAX) 50 MG tablet Take 1 tablet (50 mg) by mouth at bedtime 30 tablet 0     Vitamin D3 50 mcg (2000 units) tablet Take 1 tablet (50 mcg) by mouth daily 90 tablet 1       ROS:  4point ROS neg other than the symptoms noted above in the HPI.    PHYSICAL EXAM:  /50   Pulse 56   Temp 97.2  F (36.2  C)   Resp 16   Ht 1.676 m (5' 6\")   Wt 43.6 kg (96 lb 3.2 oz)   SpO2 92%   BMI 15.53 kg/m    Gen: sitting up in bed, alert, cooperative and in no acute distress  Resp: breathing non labored, no tachypnea; declined lung exam - she feels well  Ext: no LE edema  Neuro: CX II-XII grossly in tact; ROM in all four extremities grossly in tact  Psych: alert and oriented x3; normal affect      LABORATORY/IMAGING DATA:  Reviewed as per [facility] and/or [facility]    ASSESSMENT/PLAN:    RLL Aspiration Pneumonia with Sepsis, Resolved  Completed course of antibiotics on 8/24/2023  -- SLP following    Dysphagia  SLP evaluated and recommended mildly thick liquids.  -- she would like to be able to drink thin cold water, otherwise is OK with thickened juice  -- SLP following - advance diet as per them    Trigeminal Neuralgia  Seizure Disorder   Chronic Pain Syndrome  History of Brain Abscess s/p Ventriculostomy (2022)  Baseline pain today, per her report  -- gabapentin 100 mg with breakfast and 300 mg at bedtime, nortriptyline 50 mg at bedtime, oxcarbazepine 450 mg daily and topiramate 50 mg at bedtime   -- levetiracetam 750 mg BID  -- APAP w/ codeine 1 tab q6h PRN    Fe Deficiency Anemia  Baseline Hgb 13 range earlier this year, more recently 10-11 range  -- FeSO4 325 mg daily      Hypokalemia  K 4.1 on 8/20. She is eating well, even with the thickened liquids  -- KCl 20 mEq daily     Mild Major Recurrent Depression  Mood and spirits were good today.   -- mirtazapine 15 mg at bedtime and quetiapine 50 mg at bedtime  -- supportive cares     Slow Transit Constipation  -- psyllium daily, Senna-S 1 " tab BID PRN  -- adjust bowel regimen as needed    Physical Deconditioning  In setting of hospitalization and underlying medical conditions  -- ongoing PT/OT  -- she declined home PT/OT    Electronically signed by:  Clementine Berumen MD                        Sincerely,        Clementine Berumen MD

## 2024-09-04 DIAGNOSIS — Z09 HOSPITAL DISCHARGE FOLLOW-UP: ICD-10-CM

## 2024-09-05 ENCOUNTER — MYC REFILL (OUTPATIENT)
Dept: FAMILY MEDICINE | Facility: CLINIC | Age: 79
End: 2024-09-05
Payer: COMMERCIAL

## 2024-09-05 DIAGNOSIS — J96.01 ACUTE RESPIRATORY FAILURE WITH HYPOXIA (H): ICD-10-CM

## 2024-09-05 DIAGNOSIS — F11.20 CONTINUOUS OPIOID DEPENDENCE (H): ICD-10-CM

## 2024-09-05 DIAGNOSIS — G89.4 CHRONIC PAIN SYNDROME: ICD-10-CM

## 2024-09-05 DIAGNOSIS — J69.0 ASPIRATION PNEUMONIA OF RIGHT LUNG, UNSPECIFIED ASPIRATION PNEUMONIA TYPE, UNSPECIFIED PART OF LUNG (H): ICD-10-CM

## 2024-09-05 DIAGNOSIS — G50.0 TRIGEMINAL NEURALGIA: Primary | ICD-10-CM

## 2024-09-06 ENCOUNTER — TELEPHONE (OUTPATIENT)
Dept: FAMILY MEDICINE | Facility: CLINIC | Age: 79
End: 2024-09-06
Payer: COMMERCIAL

## 2024-09-06 RX ORDER — ACETAMINOPHEN AND CODEINE PHOSPHATE 300; 30 MG/1; MG/1
1 TABLET ORAL EVERY 6 HOURS PRN
Qty: 120 TABLET | Refills: 0 | OUTPATIENT
Start: 2024-09-06

## 2024-09-06 NOTE — TELEPHONE ENCOUNTER
MTM referral from: Transitions of Care (recent hospital discharge or ED visit)    MTM referral outreach attempt #2 on September 6, 2024 at 10:06 AM      Outcome: Patient not reachable after several attempts, sent [employer] message    Use bcbs part d for the carrier/Plan on the flowsheet      [employer]Wizard[employer] Message Sent    See Milo  [facility]   [phone]

## 2024-09-09 DIAGNOSIS — G43.719 INTRACTABLE CHRONIC MIGRAINE WITHOUT AURA AND WITHOUT STATUS MIGRAINOSUS: ICD-10-CM

## 2024-09-09 RX ORDER — ACETAMINOPHEN AND CODEINE PHOSPHATE 300; 30 MG/1; MG/1
1 TABLET ORAL EVERY 6 HOURS PRN
Qty: 120 TABLET | Refills: 0 | Status: SHIPPED | OUTPATIENT
Start: 2024-09-09

## 2024-09-09 RX ORDER — TOPIRAMATE 50 MG/1
50 TABLET, FILM COATED ORAL AT BEDTIME
Qty: 30 TABLET | Refills: 0 | Status: SHIPPED | OUTPATIENT
Start: 2024-09-09 | End: 2024-09-26

## 2024-09-13 ENCOUNTER — MYC REFILL (OUTPATIENT)
Dept: FAMILY MEDICINE | Facility: CLINIC | Age: 79
End: 2024-09-13
Payer: COMMERCIAL

## 2024-09-13 DIAGNOSIS — F41.9 ANXIETY: ICD-10-CM

## 2024-09-13 DIAGNOSIS — G89.4 CHRONIC PAIN SYNDROME: ICD-10-CM

## 2024-09-13 DIAGNOSIS — G50.0 TRIGEMINAL NEURALGIA: Primary | ICD-10-CM

## 2024-09-16 RX ORDER — GABAPENTIN 100 MG/1
300 CAPSULE ORAL AT BEDTIME
Qty: 270 CAPSULE | Refills: 0 | Status: SHIPPED | OUTPATIENT
Start: 2024-09-16 | End: 2024-12-15

## 2024-09-16 RX ORDER — QUETIAPINE FUMARATE 50 MG/1
50 TABLET, FILM COATED ORAL AT BEDTIME
Qty: 30 TABLET | Refills: 0 | Status: SHIPPED | OUTPATIENT
Start: 2024-09-16 | End: 2024-09-26

## 2024-09-20 ENCOUNTER — PATIENT OUTREACH (OUTPATIENT)
Dept: CARE COORDINATION | Facility: CLINIC | Age: 79
End: 2024-09-20
Payer: COMMERCIAL

## 2024-09-20 NOTE — PROGRESS NOTES
Clinic Care Coordination Contact  [facility]/Voicemail    Clinical Data: Care Coordinator Outreach    Outreach Documentation Number of Outreach Attempt   9/20/2024  11:04 AM 1     Left message on patient's voicemail with call back information and requested return call.    Plan: Lead Care Coordinator will try to reach patient again in 1-2 business days.    Justine Bowser, Alexandra RN Care Coordinator  [facility]  (Covering for Lead RN Care Coordinator)

## 2024-09-23 ENCOUNTER — PATIENT OUTREACH (OUTPATIENT)
Dept: CARE COORDINATION | Facility: CLINIC | Age: 79
End: 2024-09-23
Payer: COMMERCIAL

## 2024-09-23 NOTE — PROGRESS NOTES
Clinic Care Coordination Contact  [facility]/Voicemail    Clinical Data: Care Coordinator Outreach    Outreach Documentation Number of Outreach Attempt   9/20/2024  11:04 AM 1   9/23/2024  10:36 AM 2       Left message on patient's voicemail with call back information.    Plan: Care Coordinator will send unable to contact letter with care coordinator contact information via mail. Care Coordinator will do no further outreaches at this time.    UTC x 2

## 2024-09-23 NOTE — LETTER
[facility]  [address] [facility] [place] [address]    September 24, 2024    Julisa Chaney  [address] [place] [address]      Dear Julisa,    I am a clinic care coordinator who works with Mara Murillo MD with the [facility]. I have been trying to reach you recently to introduce Clinic Care Coordination. Below is a description of clinic care coordination and how I can further assist you.       The clinic care coordination team is made up of a registered nurse, , financial resource worker and community health worker who understand the health care system. The goal of clinic care coordination is to help you manage your health and improve access to the health care system. Our team works alongside your provider to assist you in determining your health and social needs. We can help you obtain health care and community resources, providing you with necessary information and education. We can work with you through any barriers and develop a care plan that helps coordinate and strengthen the communication between you and your care team.  Our services are voluntary and are offered without charge to you personally.    Please feel free to contact me with any questions or concerns regarding care coordination and what we can offer.      We are focused on providing you with the highest-quality healthcare experience possible.    Sincerely,     Aleshia Moraes RN  Clinic Care Coordination  [phone]

## 2024-09-26 ENCOUNTER — MYC REFILL (OUTPATIENT)
Dept: FAMILY MEDICINE | Facility: CLINIC | Age: 79
End: 2024-09-26
Payer: COMMERCIAL

## 2024-09-26 DIAGNOSIS — G43.719 INTRACTABLE CHRONIC MIGRAINE WITHOUT AURA AND WITHOUT STATUS MIGRAINOSUS: ICD-10-CM

## 2024-09-26 DIAGNOSIS — F41.9 ANXIETY: ICD-10-CM

## 2024-09-26 DIAGNOSIS — F32.0 CURRENT MILD EPISODE OF MAJOR DEPRESSIVE DISORDER WITHOUT PRIOR EPISODE (H): ICD-10-CM

## 2024-09-26 DIAGNOSIS — F11.20 CONTINUOUS OPIOID DEPENDENCE (H): ICD-10-CM

## 2024-09-26 DIAGNOSIS — G89.4 CHRONIC PAIN SYNDROME: ICD-10-CM

## 2024-09-26 DIAGNOSIS — G50.0 TRIGEMINAL NEURALGIA: ICD-10-CM

## 2024-09-26 DIAGNOSIS — G47.00 PERSISTENT INSOMNIA: ICD-10-CM

## 2024-09-26 DIAGNOSIS — G50.9 TRIGEMINAL NERVE DISORDER: ICD-10-CM

## 2024-09-27 RX ORDER — MIRTAZAPINE 15 MG/1
15 TABLET, FILM COATED ORAL AT BEDTIME
Qty: 30 TABLET | Refills: 0 | Status: SHIPPED | OUTPATIENT
Start: 2024-09-27

## 2024-09-27 RX ORDER — ACETAMINOPHEN AND CODEINE PHOSPHATE 300; 30 MG/1; MG/1
1 TABLET ORAL EVERY 6 HOURS PRN
Qty: 120 TABLET | Refills: 0 | OUTPATIENT
Start: 2024-09-27

## 2024-09-27 RX ORDER — TOPIRAMATE 50 MG/1
50 TABLET, FILM COATED ORAL AT BEDTIME
Qty: 30 TABLET | Refills: 0 | Status: SHIPPED | OUTPATIENT
Start: 2024-09-27

## 2024-09-27 RX ORDER — LEVETIRACETAM 750 MG/1
750 TABLET ORAL 2 TIMES DAILY
Qty: 60 TABLET | Refills: 0 | Status: SHIPPED | OUTPATIENT
Start: 2024-09-27

## 2024-09-27 RX ORDER — QUETIAPINE FUMARATE 50 MG/1
50 TABLET, FILM COATED ORAL AT BEDTIME
Qty: 30 TABLET | Refills: 0 | Status: SHIPPED | OUTPATIENT
Start: 2024-09-27

## 2024-09-27 RX ORDER — MULTIVITAMIN
1 TABLET ORAL DAILY
Qty: 100 TABLET | Refills: 3 | Status: SHIPPED | OUTPATIENT
Start: 2024-09-27

## 2024-09-27 RX ORDER — NORTRIPTYLINE HYDROCHLORIDE 50 MG/1
50 CAPSULE ORAL AT BEDTIME
Qty: 90 CAPSULE | Refills: 3 | Status: SHIPPED | OUTPATIENT
Start: 2024-09-27

## 2024-10-10 DIAGNOSIS — G89.4 CHRONIC PAIN SYNDROME: ICD-10-CM

## 2024-10-10 DIAGNOSIS — F11.20 CONTINUOUS OPIOID DEPENDENCE (H): ICD-10-CM

## 2024-10-10 DIAGNOSIS — G50.0 TRIGEMINAL NEURALGIA: ICD-10-CM

## 2024-10-10 RX ORDER — ACETAMINOPHEN AND CODEINE PHOSPHATE 300; 30 MG/1; MG/1
1 TABLET ORAL EVERY 6 HOURS PRN
Qty: 120 TABLET | Refills: 0 | Status: SHIPPED | OUTPATIENT
Start: 2024-10-10 | End: 2024-11-06

## 2024-11-06 ENCOUNTER — MYC REFILL (OUTPATIENT)
Dept: FAMILY MEDICINE | Facility: CLINIC | Age: 79
End: 2024-11-06
Payer: COMMERCIAL

## 2024-11-06 DIAGNOSIS — F41.9 ANXIETY: ICD-10-CM

## 2024-11-06 DIAGNOSIS — F11.20 CONTINUOUS OPIOID DEPENDENCE (H): ICD-10-CM

## 2024-11-06 DIAGNOSIS — G50.0 TRIGEMINAL NEURALGIA: ICD-10-CM

## 2024-11-06 DIAGNOSIS — F32.0 CURRENT MILD EPISODE OF MAJOR DEPRESSIVE DISORDER WITHOUT PRIOR EPISODE (H): ICD-10-CM

## 2024-11-06 DIAGNOSIS — G47.00 PERSISTENT INSOMNIA: ICD-10-CM

## 2024-11-06 DIAGNOSIS — G43.719 INTRACTABLE CHRONIC MIGRAINE WITHOUT AURA AND WITHOUT STATUS MIGRAINOSUS: ICD-10-CM

## 2024-11-06 DIAGNOSIS — G89.4 CHRONIC PAIN SYNDROME: ICD-10-CM

## 2024-11-06 DIAGNOSIS — G51.8 FACIAL NEURALGIA: ICD-10-CM

## 2024-11-06 DIAGNOSIS — D50.0 IRON DEFICIENCY ANEMIA DUE TO CHRONIC BLOOD LOSS: ICD-10-CM

## 2024-11-06 DIAGNOSIS — E55.9 VITAMIN D DEFICIENCY: ICD-10-CM

## 2024-11-06 RX ORDER — OXCARBAZEPINE 150 MG/1
TABLET, FILM COATED ORAL
Qty: 270 TABLET | Refills: 1 | OUTPATIENT
Start: 2024-11-06

## 2024-11-06 RX ORDER — FERROUS SULFATE 325(65) MG
325 TABLET ORAL
Qty: 90 TABLET | Refills: 1 | OUTPATIENT
Start: 2024-11-06

## 2024-11-06 RX ORDER — CHOLECALCIFEROL (VITAMIN D3) 50 MCG
1 TABLET ORAL DAILY
Qty: 90 TABLET | Refills: 1 | Status: SHIPPED | OUTPATIENT
Start: 2024-11-06

## 2024-11-06 RX ORDER — QUETIAPINE FUMARATE 50 MG/1
50 TABLET, FILM COATED ORAL AT BEDTIME
Qty: 30 TABLET | Refills: 0 | Status: SHIPPED | OUTPATIENT
Start: 2024-11-06

## 2024-11-06 RX ORDER — TOPIRAMATE 50 MG/1
50 TABLET, FILM COATED ORAL AT BEDTIME
Qty: 30 TABLET | Refills: 0 | Status: SHIPPED | OUTPATIENT
Start: 2024-11-06

## 2024-11-06 RX ORDER — LEVETIRACETAM 750 MG/1
750 TABLET ORAL 2 TIMES DAILY
Qty: 60 TABLET | Refills: 0 | Status: SHIPPED | OUTPATIENT
Start: 2024-11-06

## 2024-11-06 RX ORDER — ACETAMINOPHEN AND CODEINE PHOSPHATE 300; 30 MG/1; MG/1
1 TABLET ORAL EVERY 6 HOURS PRN
Qty: 120 TABLET | Refills: 0 | Status: SHIPPED | OUTPATIENT
Start: 2024-11-06

## 2024-11-06 RX ORDER — POTASSIUM CHLORIDE 1500 MG/1
20 TABLET, EXTENDED RELEASE ORAL DAILY
Qty: 90 TABLET | Refills: 1 | OUTPATIENT
Start: 2024-11-06

## 2024-11-06 RX ORDER — MIRTAZAPINE 15 MG/1
15 TABLET, FILM COATED ORAL AT BEDTIME
Qty: 30 TABLET | Refills: 0 | Status: SHIPPED | OUTPATIENT
Start: 2024-11-06

## 2024-12-05 NOTE — PHARMACY-ADMISSION MEDICATION HISTORY
Pharmacist Admission Medication History    Admission medication history is complete. The information provided in this note is only as accurate as the sources available at the time of the update.    Information Source(s): Family member and CareEverywhere/SureScripts via phone    Pertinent Information: N/A    Changes made to PTA medication list:  Added: None  Deleted: Loperamide, pantoprazole  Changed: Tylenol #3 from QID PRN to every 6 hours scheduled    Allergies reviewed with patient and updates made in EHR: yes    Medication History Completed By: KHURRAM TORRES [facility] 8/11/2024 2:51 PM    PTA Med List   Medication Sig Last Dose    acetaminophen-codeine (TYLENOL #3) 300-30 MG per tablet Take 1 tablet by mouth every 6 hours 8/10/2024 at pm    ferrous sulfate (FEROSUL) 325 (65 Fe) MG tablet Take 1 tablet (325 mg) by mouth daily (with breakfast) 8/10/2024 at am    gabapentin (NEURONTIN) 100 MG capsule Take 100 mg by mouth daily 8/10/2024 at am    gabapentin (NEURONTIN) 100 MG capsule Take 300 mg by mouth at bedtime 8/10/2024 at pm    levETIRAcetam (KEPPRA) 750 MG tablet Take 1 tablet (750 mg) by mouth 2 times daily 8/10/2024 at pm    menthol-zinc oxide (CALMOSEPTINE) 0.44-20.6 % OINT ointment Apply topically 4 times daily as needed for skin protection (for sores on buttox from sitting) Past Month at prn    METAMUCIL FIBER PO Take 1 capsule by mouth daily 8/10/2024 at am    mirtazapine (REMERON) 15 MG tablet Take 1 tablet (15 mg) by mouth at bedtime 8/10/2024 at pm    Multiple Vitamin (MULTIVITAMIN) TABS TAKE 1 TABLET BY MOUTH EVERY DAY 8/10/2024 at am    nortriptyline (PAMELOR) 50 MG capsule Take 1 capsule (50 mg) by mouth at bedtime 8/10/2024 at pm    OXcarbazepine (TRILEPTAL) 150 MG tablet TAKE 3 TABLETS(450 MG) BY MOUTH DAILY 8/10/2024 at pm    potassium chloride elin ER (KLOR-CON M20) 20 MEQ CR tablet Take 1 tablet (20 mEq) by mouth daily 8/10/2024 at am    QUEtiapine (SEROQUEL) 50 MG tablet Take 1 tablet (50 mg)  by mouth at bedtime 8/10/2024 at pm    topiramate (TOPAMAX) 50 MG tablet Take 1 tablet (50 mg) by mouth at bedtime 8/10/2024 at pm    Vitamin D3 50 mcg (2000 units) tablet Take 1 tablet (50 mcg) by mouth daily 8/10/2024 at am      [id number]: || ||00\01||False;

## 2024-12-07 DIAGNOSIS — F41.9 ANXIETY: ICD-10-CM

## 2024-12-07 DIAGNOSIS — F32.0 CURRENT MILD EPISODE OF MAJOR DEPRESSIVE DISORDER WITHOUT PRIOR EPISODE (H): ICD-10-CM

## 2024-12-09 DIAGNOSIS — G47.00 PERSISTENT INSOMNIA: ICD-10-CM

## 2024-12-09 DIAGNOSIS — G43.719 INTRACTABLE CHRONIC MIGRAINE WITHOUT AURA AND WITHOUT STATUS MIGRAINOSUS: ICD-10-CM

## 2024-12-09 RX ORDER — LEVETIRACETAM 750 MG/1
750 TABLET ORAL 2 TIMES DAILY
Qty: 60 TABLET | Refills: 0 | Status: SHIPPED | OUTPATIENT
Start: 2024-12-09

## 2024-12-09 RX ORDER — QUETIAPINE FUMARATE 50 MG/1
50 TABLET, FILM COATED ORAL AT BEDTIME
Qty: 30 TABLET | Refills: 0 | Status: SHIPPED | OUTPATIENT
Start: 2024-12-09

## 2024-12-09 RX ORDER — MIRTAZAPINE 15 MG/1
15 TABLET, FILM COATED ORAL AT BEDTIME
Qty: 30 TABLET | Refills: 0 | Status: SHIPPED | OUTPATIENT
Start: 2024-12-09

## 2024-12-09 RX ORDER — TOPIRAMATE 50 MG/1
50 TABLET, FILM COATED ORAL AT BEDTIME
Qty: 30 TABLET | Refills: 0 | Status: SHIPPED | OUTPATIENT
Start: 2024-12-09

## 2024-12-12 ENCOUNTER — PATIENT OUTREACH (OUTPATIENT)
Dept: CARE COORDINATION | Facility: CLINIC | Age: 79
End: 2024-12-12
Payer: COMMERCIAL

## 2024-12-17 DIAGNOSIS — G50.0 TRIGEMINAL NEURALGIA: ICD-10-CM

## 2024-12-17 DIAGNOSIS — G89.4 CHRONIC PAIN SYNDROME: ICD-10-CM

## 2024-12-17 RX ORDER — GABAPENTIN 100 MG/1
CAPSULE ORAL
Qty: 270 CAPSULE | Refills: 0 | Status: SHIPPED | OUTPATIENT
Start: 2024-12-17

## 2024-12-26 ENCOUNTER — PATIENT OUTREACH (OUTPATIENT)
Dept: CARE COORDINATION | Facility: CLINIC | Age: 79
End: 2024-12-26
Payer: COMMERCIAL

## 2025-01-02 ENCOUNTER — MYC REFILL (OUTPATIENT)
Dept: FAMILY MEDICINE | Facility: CLINIC | Age: 80
End: 2025-01-02
Payer: COMMERCIAL

## 2025-01-02 DIAGNOSIS — G50.0 TRIGEMINAL NEURALGIA: ICD-10-CM

## 2025-01-02 DIAGNOSIS — G43.719 INTRACTABLE CHRONIC MIGRAINE WITHOUT AURA AND WITHOUT STATUS MIGRAINOSUS: Primary | ICD-10-CM

## 2025-01-03 DIAGNOSIS — G50.0 TRIGEMINAL NEURALGIA: ICD-10-CM

## 2025-01-03 DIAGNOSIS — F11.20 CONTINUOUS OPIOID DEPENDENCE (H): ICD-10-CM

## 2025-01-03 DIAGNOSIS — G89.4 CHRONIC PAIN SYNDROME: ICD-10-CM

## 2025-01-05 ENCOUNTER — MYC REFILL (OUTPATIENT)
Dept: FAMILY MEDICINE | Facility: CLINIC | Age: 80
End: 2025-01-05
Payer: COMMERCIAL

## 2025-01-05 DIAGNOSIS — F11.20 CONTINUOUS OPIOID DEPENDENCE (H): ICD-10-CM

## 2025-01-05 DIAGNOSIS — F41.9 ANXIETY: ICD-10-CM

## 2025-01-05 DIAGNOSIS — G89.4 CHRONIC PAIN SYNDROME: ICD-10-CM

## 2025-01-05 DIAGNOSIS — G47.00 PERSISTENT INSOMNIA: ICD-10-CM

## 2025-01-05 DIAGNOSIS — F32.0 CURRENT MILD EPISODE OF MAJOR DEPRESSIVE DISORDER WITHOUT PRIOR EPISODE: ICD-10-CM

## 2025-01-05 DIAGNOSIS — G50.0 TRIGEMINAL NEURALGIA: ICD-10-CM

## 2025-01-05 DIAGNOSIS — G43.719 INTRACTABLE CHRONIC MIGRAINE WITHOUT AURA AND WITHOUT STATUS MIGRAINOSUS: ICD-10-CM

## 2025-01-06 DIAGNOSIS — F41.9 ANXIETY: ICD-10-CM

## 2025-01-06 DIAGNOSIS — F32.0 CURRENT MILD EPISODE OF MAJOR DEPRESSIVE DISORDER WITHOUT PRIOR EPISODE: ICD-10-CM

## 2025-01-06 RX ORDER — ACETAMINOPHEN AND CODEINE PHOSPHATE 300; 30 MG/1; MG/1
1 TABLET ORAL EVERY 6 HOURS PRN
Qty: 120 TABLET | Refills: 0 | OUTPATIENT
Start: 2025-01-06

## 2025-01-06 RX ORDER — ACETAMINOPHEN AND CODEINE PHOSPHATE 300; 30 MG/1; MG/1
1 TABLET ORAL EVERY 6 HOURS PRN
Qty: 120 TABLET | Refills: 0 | Status: SHIPPED | OUTPATIENT
Start: 2025-01-06

## 2025-01-06 RX ORDER — GABAPENTIN 100 MG/1
100 CAPSULE ORAL DAILY
Qty: 90 CAPSULE | Refills: 1 | Status: SHIPPED | OUTPATIENT
Start: 2025-01-06

## 2025-01-06 RX ORDER — GABAPENTIN 300 MG/1
300 CAPSULE ORAL AT BEDTIME
Qty: 90 CAPSULE | Refills: 2 | Status: SHIPPED | OUTPATIENT
Start: 2025-01-06

## 2025-01-07 DIAGNOSIS — G43.719 INTRACTABLE CHRONIC MIGRAINE WITHOUT AURA AND WITHOUT STATUS MIGRAINOSUS: ICD-10-CM

## 2025-01-07 RX ORDER — TOPIRAMATE 50 MG/1
50 TABLET, FILM COATED ORAL AT BEDTIME
Qty: 30 TABLET | Refills: 0 | Status: SHIPPED | OUTPATIENT
Start: 2025-01-07 | End: 2025-01-08

## 2025-01-07 RX ORDER — QUETIAPINE FUMARATE 50 MG/1
50 TABLET, FILM COATED ORAL AT BEDTIME
Qty: 30 TABLET | Refills: 0 | Status: SHIPPED | OUTPATIENT
Start: 2025-01-07

## 2025-01-07 RX ORDER — LEVETIRACETAM 750 MG/1
750 TABLET ORAL 2 TIMES DAILY
Qty: 60 TABLET | Refills: 0 | Status: SHIPPED | OUTPATIENT
Start: 2025-01-07

## 2025-01-07 RX ORDER — MIRTAZAPINE 15 MG/1
15 TABLET, FILM COATED ORAL AT BEDTIME
Qty: 30 TABLET | Refills: 0 | Status: SHIPPED | OUTPATIENT
Start: 2025-01-07

## 2025-01-08 RX ORDER — TOPIRAMATE 50 MG/1
50 TABLET, FILM COATED ORAL AT BEDTIME
Qty: 90 TABLET | Refills: 3 | Status: SHIPPED | OUTPATIENT
Start: 2025-01-08

## 2025-02-02 ASSESSMENT — PATIENT HEALTH QUESTIONNAIRE - PHQ9
SUM OF ALL RESPONSES TO PHQ QUESTIONS 1-9: 0
SUM OF ALL RESPONSES TO PHQ QUESTIONS 1-9: 0

## 2025-02-03 ENCOUNTER — ANCILLARY PROCEDURE (OUTPATIENT)
Dept: GENERAL RADIOLOGY | Facility: CLINIC | Age: 80
End: 2025-02-03
Attending: FAMILY MEDICINE
Payer: COMMERCIAL

## 2025-02-03 ENCOUNTER — OFFICE VISIT (OUTPATIENT)
Dept: FAMILY MEDICINE | Facility: CLINIC | Age: 80
End: 2025-02-03
Payer: COMMERCIAL

## 2025-02-03 VITALS
TEMPERATURE: 97 F | DIASTOLIC BLOOD PRESSURE: 62 MMHG | BODY MASS INDEX: 20.41 KG/M2 | HEART RATE: 71 BPM | RESPIRATION RATE: 16 BRPM | WEIGHT: 127 LBS | SYSTOLIC BLOOD PRESSURE: 98 MMHG | HEIGHT: 66 IN | OXYGEN SATURATION: 93 %

## 2025-02-03 DIAGNOSIS — Z23 NEED FOR SHINGLES VACCINE: ICD-10-CM

## 2025-02-03 DIAGNOSIS — J43.9 PULMONARY EMPHYSEMA, UNSPECIFIED EMPHYSEMA TYPE (H): ICD-10-CM

## 2025-02-03 DIAGNOSIS — E55.9 VITAMIN D DEFICIENCY: ICD-10-CM

## 2025-02-03 DIAGNOSIS — Z29.11 NEED FOR VACCINATION AGAINST RESPIRATORY SYNCYTIAL VIRUS: ICD-10-CM

## 2025-02-03 DIAGNOSIS — Z23 NEED FOR PROPHYLACTIC VACCINATION AGAINST HEPATITIS A: ICD-10-CM

## 2025-02-03 DIAGNOSIS — R50.9 FEVER, UNSPECIFIED FEVER CAUSE: ICD-10-CM

## 2025-02-03 DIAGNOSIS — R50.9 FEVER, UNSPECIFIED FEVER CAUSE: Primary | ICD-10-CM

## 2025-02-03 LAB
FLUAV AG SPEC QL IA: NEGATIVE
FLUBV AG SPEC QL IA: NEGATIVE

## 2025-02-03 PROCEDURE — 71046 X-RAY EXAM CHEST 2 VIEWS: CPT | Mod: TC | Performed by: RADIOLOGY

## 2025-02-03 PROCEDURE — 87804 INFLUENZA ASSAY W/OPTIC: CPT | Performed by: FAMILY MEDICINE

## 2025-02-03 PROCEDURE — 99215 OFFICE O/P EST HI 40 MIN: CPT | Performed by: FAMILY MEDICINE

## 2025-02-03 RX ORDER — AZITHROMYCIN 250 MG/1
TABLET, FILM COATED ORAL
Qty: 6 TABLET | Refills: 0 | Status: SHIPPED | OUTPATIENT
Start: 2025-02-03 | End: 2025-02-08

## 2025-02-03 ASSESSMENT — ANXIETY QUESTIONNAIRES
GAD7 TOTAL SCORE: 0
GAD7 TOTAL SCORE: 0
6. BECOMING EASILY ANNOYED OR IRRITABLE: NOT AT ALL
8. IF YOU CHECKED OFF ANY PROBLEMS, HOW DIFFICULT HAVE THESE MADE IT FOR YOU TO DO YOUR WORK, TAKE CARE OF THINGS AT HOME, OR GET ALONG WITH OTHER PEOPLE?: NOT DIFFICULT AT ALL
2. NOT BEING ABLE TO STOP OR CONTROL WORRYING: NOT AT ALL
GAD7 TOTAL SCORE: 0
IF YOU CHECKED OFF ANY PROBLEMS ON THIS QUESTIONNAIRE, HOW DIFFICULT HAVE THESE PROBLEMS MADE IT FOR YOU TO DO YOUR WORK, TAKE CARE OF THINGS AT HOME, OR GET ALONG WITH OTHER PEOPLE: NOT DIFFICULT AT ALL
3. WORRYING TOO MUCH ABOUT DIFFERENT THINGS: NOT AT ALL
1. FEELING NERVOUS, ANXIOUS, OR ON EDGE: NOT AT ALL
4. TROUBLE RELAXING: NOT AT ALL
5. BEING SO RESTLESS THAT IT IS HARD TO SIT STILL: NOT AT ALL
7. FEELING AFRAID AS IF SOMETHING AWFUL MIGHT HAPPEN: NOT AT ALL
7. FEELING AFRAID AS IF SOMETHING AWFUL MIGHT HAPPEN: NOT AT ALL

## 2025-02-03 NOTE — PROGRESS NOTES
Assessment & Plan     Fever, unspecified fever cause  Likely infectious from emphysema exacerbation, management discussed, has done well with azithromycin in the past, prescription sent.  Follow with PCP if not improving or go to the ED for worsening symptoms.  - XR Chest 2 Views; Future  - Influenza A & B Antigen - Clinic Collect  - RSV rapid antigen; Future  - azithromycin (ZITHROMAX) 250 MG tablet; Take 2 tablets (500 mg) by mouth daily for 1 day, THEN 1 tablet (250 mg) daily for 4 days.    Pulmonary emphysema, unspecified emphysema type (H)  On review of previous imaging, chest x-ray done today confirmed emphysema, management discussed, acute management with azithromycin, long-term management by PCP or pulmonologist with PFTs etc.  - azithromycin (ZITHROMAX) 250 MG tablet; Take 2 tablets (500 mg) by mouth daily for 1 day, THEN 1 tablet (250 mg) daily for 4 days.    Need for shingles vaccine  Need for prophylactic vaccination against hepatitis A  Need for vaccination against respiratory syncytial virus  To be done at the patient local pharmacy.    Vitamin D deficiency  Stable on vitamin D replacement.        Review of external notes as documented elsewhere in note  40 minutes spent by me on the date of the encounter doing chart review, review of outside records, review of test results, interpretation of tests, patient visit, documentation, and discussion with family (daughter)       Ladarius Egan is a 79 year old, presenting for the following health issues:  Cold Symptoms (Cough, fever and runny nose )      2/3/2025     1:13 PM   Additional Questions   Roomed by Daja     History of Present Illness       Reason for visit:  Infection  Symptom onset:  3-7 days ago  Symptoms include:  Fever ,had a cold thought it was gone, lack of appetite,  Symptom intensity:  Severe  Symptom progression:  Worsening  Had these symptoms before:  No  What makes it worse:  No  What makes it better:  Antibiotics only sure i  "have a bad infection again   She is taking medications regularly.     She had URI symptoms about a week ago, daughter gave her some over-the-counter cough medicine with improvement, for the past few days patient has been experiencing fever, sore very minimal cough, mild runny nose and nasal congestion.  She has a history of emphysema on previous imaging.  Her daughter is adding that in the past exacerbation progressed very fast to the point that she required hospitalization.      Review of Systems  Constitutional, HEENT, cardiovascular, pulmonary, gi and gu systems are negative, except as otherwise noted.      Objective    BP 98/62 (BP Location: Left arm, Patient Position: Sitting, Cuff Size: Adult Regular)   Pulse 71   Temp 97  F (36.1  C) (Temporal)   Resp 16   Ht 1.676 m (5' 5.98\")   Wt 57.6 kg (127 lb)   SpO2 93%   BMI 20.51 kg/m    Body mass index is 20.51 kg/m .  Physical Exam   GENERAL: alert and no distress  NECK: no adenopathy, no asymmetry, masses, or scars  RESP: rales throughout  CV: regular rate and rhythm, normal S1 S2, no S3 or S4, no murmur, click or rub, no peripheral edema  ABDOMEN: soft, nontender, no hepatosplenomegaly, no masses and bowel sounds normal  MS: no gross musculoskeletal defects noted, no edema    XR Chest 2 Views    Result Date: 2/3/2025  EXAM: XR CHEST 2 VIEWS LOCATION: [facility] DATE: 2/3/2025 INDICATION: Fever. COMPARISON: CTs 8/11/2024, 5/28/2024 and CXR 1/17/2024     IMPRESSION: Shallow inspiration/low lung volumes. The foci of consolidation and volume loss throughout the right lower lung present on 11/20/2024 have decreased significantly. Numerous strands of fibrosis and/or linear atelectasis in both lower lungs appear similar dating back to 1/17/2024. Lungs otherwise clear. No pleural effusion. Heart size normal. Thoracic kyphosis. Bones are demineralized.       This note was completed in part using a voice recognition software, any " grammatical or context distortion are unintentional and inherent to the software.    Signed Electronically by: Debi Rodriguez MD

## 2025-02-06 ENCOUNTER — MYC REFILL (OUTPATIENT)
Dept: FAMILY MEDICINE | Facility: CLINIC | Age: 80
End: 2025-02-06
Payer: COMMERCIAL

## 2025-02-06 DIAGNOSIS — G47.00 PERSISTENT INSOMNIA: ICD-10-CM

## 2025-02-06 DIAGNOSIS — F11.20 CONTINUOUS OPIOID DEPENDENCE (H): ICD-10-CM

## 2025-02-06 DIAGNOSIS — F32.0 CURRENT MILD EPISODE OF MAJOR DEPRESSIVE DISORDER WITHOUT PRIOR EPISODE: ICD-10-CM

## 2025-02-06 DIAGNOSIS — F41.9 ANXIETY: ICD-10-CM

## 2025-02-06 DIAGNOSIS — G51.8 FACIAL NEURALGIA: ICD-10-CM

## 2025-02-06 DIAGNOSIS — G50.0 TRIGEMINAL NEURALGIA: ICD-10-CM

## 2025-02-06 DIAGNOSIS — G89.4 CHRONIC PAIN SYNDROME: ICD-10-CM

## 2025-02-06 DIAGNOSIS — G43.719 INTRACTABLE CHRONIC MIGRAINE WITHOUT AURA AND WITHOUT STATUS MIGRAINOSUS: ICD-10-CM

## 2025-02-06 DIAGNOSIS — G50.9 TRIGEMINAL NERVE DISORDER: ICD-10-CM

## 2025-02-06 DIAGNOSIS — D50.0 IRON DEFICIENCY ANEMIA DUE TO CHRONIC BLOOD LOSS: ICD-10-CM

## 2025-02-06 DIAGNOSIS — E55.9 VITAMIN D DEFICIENCY: ICD-10-CM

## 2025-02-06 RX ORDER — LEVETIRACETAM 750 MG/1
750 TABLET ORAL 2 TIMES DAILY
Qty: 60 TABLET | Refills: 0 | Status: SHIPPED | OUTPATIENT
Start: 2025-02-06

## 2025-02-06 RX ORDER — TOPIRAMATE 50 MG/1
50 TABLET, FILM COATED ORAL AT BEDTIME
Qty: 90 TABLET | Refills: 3 | Status: CANCELLED | OUTPATIENT
Start: 2025-02-06

## 2025-02-06 RX ORDER — MIRTAZAPINE 15 MG/1
15 TABLET, FILM COATED ORAL AT BEDTIME
Qty: 30 TABLET | Refills: 0 | Status: SHIPPED | OUTPATIENT
Start: 2025-02-06

## 2025-02-06 RX ORDER — QUETIAPINE FUMARATE 50 MG/1
50 TABLET, FILM COATED ORAL AT BEDTIME
Qty: 30 TABLET | Refills: 0 | Status: SHIPPED | OUTPATIENT
Start: 2025-02-06

## 2025-02-06 RX ORDER — OXCARBAZEPINE 150 MG/1
TABLET, FILM COATED ORAL
Qty: 270 TABLET | Refills: 1 | Status: SHIPPED | OUTPATIENT
Start: 2025-02-06

## 2025-02-06 RX ORDER — ACETAMINOPHEN AND CODEINE PHOSPHATE 300; 30 MG/1; MG/1
1 TABLET ORAL EVERY 6 HOURS PRN
Qty: 120 TABLET | OUTPATIENT
Start: 2025-02-06

## 2025-02-06 RX ORDER — FERROUS SULFATE 325(65) MG
325 TABLET ORAL
Qty: 90 TABLET | Refills: 1 | Status: SHIPPED | OUTPATIENT
Start: 2025-02-06

## 2025-02-06 RX ORDER — CHOLECALCIFEROL (VITAMIN D3) 50 MCG
1 TABLET ORAL DAILY
Qty: 90 TABLET | Refills: 1 | Status: SHIPPED | OUTPATIENT
Start: 2025-02-06

## 2025-02-06 RX ORDER — MULTIVITAMIN
1 TABLET ORAL DAILY
Qty: 100 TABLET | Refills: 3 | Status: CANCELLED | OUTPATIENT
Start: 2025-02-06

## 2025-02-06 RX ORDER — TOPIRAMATE 50 MG/1
50 TABLET, FILM COATED ORAL AT BEDTIME
Qty: 90 TABLET | Refills: 3 | Status: SHIPPED | OUTPATIENT
Start: 2025-02-06

## 2025-02-06 RX ORDER — POTASSIUM CHLORIDE 1500 MG/1
20 TABLET, EXTENDED RELEASE ORAL DAILY
Qty: 90 TABLET | Refills: 1 | Status: SHIPPED | OUTPATIENT
Start: 2025-02-06

## 2025-02-06 RX ORDER — ACETAMINOPHEN AND CODEINE PHOSPHATE 300; 30 MG/1; MG/1
1 TABLET ORAL EVERY 6 HOURS PRN
Qty: 120 TABLET | Refills: 0 | Status: SHIPPED | OUTPATIENT
Start: 2025-02-06

## 2025-02-06 NOTE — TELEPHONE ENCOUNTER
OXcarbazepine (TRILEPTAL) 150 MG tablet [[facility]] 07/08/2024  ferrous sulfate (FEROSUL) 325 (65 Fe) MG tablet [Mara Raje] 07/29/2024   potassium chloride elin ER (KLOR-CON M20) 20 MEQ CR tablet [Mara Raje]  07/29/2024   levETIRAcetam (KEPPRA) 750 MG tablet [Mara Raje] 01/07/2025   Vitamin D3 50 mcg (2000 units) tablet [facility]   acetaminophen-codeine (TYLENOL #3) 300-30 MG per tablet [Mara Raje] 01/06/2025             mirtazapine (REMERON) 15 MG tablet [Mara Raje] 01/07/2025   mirtazapine (REMERON) 15 MG tablet [Mara Raje] 01/07/2025       QUEtiapine (SEROQUEL) 50 MG tablet [Mara Raje] 01/07/2025   QUEtiapine (SEROQUEL) 50 MG tablet [Mara Raje] 01/07/2025       topiramate (TOPAMAX) 50 MG tablet [Mara Raje] 01/08/2025 (should have refills on file)  Multiple Vitamin (MULTIVITAMIN) TABS [[facility]]  (should have refills on file)

## 2025-02-15 ENCOUNTER — HEALTH MAINTENANCE LETTER (OUTPATIENT)
Age: 80
End: 2025-02-15

## 2025-02-24 ENCOUNTER — PATIENT OUTREACH (OUTPATIENT)
Dept: CARE COORDINATION | Facility: CLINIC | Age: 80
End: 2025-02-24
Payer: COMMERCIAL

## 2025-02-24 NOTE — LETTER
[facility]  [address] [facility] [place] [address]    February 25, 2025    Julisa Chaney  [address]  [place] [address]      Dear Julisa,    I am a clinic community health worker who works with Mara Murillo MD with the [facility]. I have been trying to reach you recently to introduce Clinic Care Coordination. Below is a description of clinic care coordination and how I can further assist you.       The clinic care coordination team is made up of a registered nurse, , financial resource worker and community health worker who understand the health care system. The goal of clinic care coordination is to help you manage your health and improve access to the health care system. Our team works alongside your provider to assist you in determining your health and social needs. We can help you obtain health care and community resources, providing you with necessary information and education. We can work with you through any barriers and develop a care plan that helps coordinate and strengthen the communication between you and your care team.  Our services are voluntary and are offered without charge to you personally.    Please feel free to contact me with any questions or concerns regarding care coordination and what we can offer.      We are focused on providing you with the highest-quality healthcare experience possible.    Sincerely,     Janine BRADLEY  Community Health Worker    Connected Care Resources   [facility]

## 2025-02-24 NOTE — PROGRESS NOTES
Clinic Care Coordination Contact  [facility]/Voicemail      Clinical Data: CHW Outreach    Outreach attempted x 1 regarding CCC enrollment.  CHW was unable to leave a message on patient's voicemail with call back information and requested return call. Due to automated  did not provide an option to do so.     Plan: CHW will attempt another outreach to the patient via phone regarding enrollment into CCC in 1-2 business days.    ANALISA Lee  Clinic Care Coordination   [facility]   Phone: [phone]  [email or web address]

## 2025-02-25 ENCOUNTER — TELEPHONE (OUTPATIENT)
Dept: FAMILY MEDICINE | Facility: CLINIC | Age: 80
End: 2025-02-25
Payer: COMMERCIAL

## 2025-02-25 NOTE — TELEPHONE ENCOUNTER
MT referral from: [facility] visit (referral by provider)    MT referral outreach attempt #2 on February 25, 2025 at 11:13 AM      Outcome: Patient not reachable after several attempts, sent ChipIn message    Use bcbs part d for the carrier/Plan on the flowsheet      MyChart Message Sent    See Milo  [facility]   [phone]

## 2025-03-10 DIAGNOSIS — G47.00 PERSISTENT INSOMNIA: ICD-10-CM

## 2025-03-10 RX ORDER — LEVETIRACETAM 750 MG/1
750 TABLET ORAL 2 TIMES DAILY
Qty: 60 TABLET | Refills: 0 | Status: SHIPPED | OUTPATIENT
Start: 2025-03-10

## 2025-03-17 DIAGNOSIS — F11.20 CONTINUOUS OPIOID DEPENDENCE (H): ICD-10-CM

## 2025-03-17 DIAGNOSIS — G89.4 CHRONIC PAIN SYNDROME: ICD-10-CM

## 2025-03-17 DIAGNOSIS — G50.0 TRIGEMINAL NEURALGIA: ICD-10-CM

## 2025-03-18 RX ORDER — ACETAMINOPHEN AND CODEINE PHOSPHATE 300; 30 MG/1; MG/1
1 TABLET ORAL EVERY 6 HOURS PRN
Qty: 120 TABLET | Refills: 0 | Status: SHIPPED | OUTPATIENT
Start: 2025-03-18

## 2025-04-07 DIAGNOSIS — F41.9 ANXIETY: ICD-10-CM

## 2025-04-07 DIAGNOSIS — F32.0 CURRENT MILD EPISODE OF MAJOR DEPRESSIVE DISORDER WITHOUT PRIOR EPISODE: ICD-10-CM

## 2025-04-07 DIAGNOSIS — G47.00 PERSISTENT INSOMNIA: ICD-10-CM

## 2025-04-08 RX ORDER — QUETIAPINE FUMARATE 50 MG/1
50 TABLET, FILM COATED ORAL AT BEDTIME
Qty: 30 TABLET | Refills: 0 | Status: SHIPPED | OUTPATIENT
Start: 2025-04-08

## 2025-04-08 RX ORDER — MIRTAZAPINE 15 MG/1
15 TABLET, FILM COATED ORAL AT BEDTIME
Qty: 30 TABLET | Refills: 0 | Status: SHIPPED | OUTPATIENT
Start: 2025-04-08

## 2025-04-08 RX ORDER — LEVETIRACETAM 750 MG/1
750 TABLET ORAL 2 TIMES DAILY
Qty: 60 TABLET | Refills: 0 | Status: SHIPPED | OUTPATIENT
Start: 2025-04-08

## 2025-05-07 ENCOUNTER — TELEPHONE (OUTPATIENT)
Dept: FAMILY MEDICINE | Facility: CLINIC | Age: 80
End: 2025-05-07
Payer: COMMERCIAL

## 2025-05-07 DIAGNOSIS — G50.0 TRIGEMINAL NEURALGIA: ICD-10-CM

## 2025-05-07 DIAGNOSIS — G89.4 CHRONIC PAIN SYNDROME: ICD-10-CM

## 2025-05-07 DIAGNOSIS — F11.20 CONTINUOUS OPIOID DEPENDENCE (H): ICD-10-CM

## 2025-05-07 RX ORDER — ACETAMINOPHEN AND CODEINE PHOSPHATE 300; 30 MG/1; MG/1
1 TABLET ORAL EVERY 6 HOURS PRN
Qty: 120 TABLET | Refills: 0 | Status: SHIPPED | OUTPATIENT
Start: 2025-05-07

## 2025-05-07 NOTE — TELEPHONE ENCOUNTER
Patient Quality Outreach    Patient is due for the following:   Physical Annual Wellness Visit    Action(s) Taken:   Schedule a Annual Wellness Visit    Type of outreach:    Phone, left message for patient/parent to call back.    Questions for provider review:    None         Kyleigh Huggins MA  Chart routed to None.

## 2025-05-12 DIAGNOSIS — G47.00 PERSISTENT INSOMNIA: ICD-10-CM

## 2025-05-12 DIAGNOSIS — F32.0 CURRENT MILD EPISODE OF MAJOR DEPRESSIVE DISORDER WITHOUT PRIOR EPISODE: ICD-10-CM

## 2025-05-12 DIAGNOSIS — F41.9 ANXIETY: ICD-10-CM

## 2025-05-12 RX ORDER — MIRTAZAPINE 15 MG/1
15 TABLET, FILM COATED ORAL AT BEDTIME
Qty: 30 TABLET | Refills: 0 | Status: SHIPPED | OUTPATIENT
Start: 2025-05-12

## 2025-05-12 RX ORDER — LEVETIRACETAM 750 MG/1
750 TABLET ORAL 2 TIMES DAILY
Qty: 60 TABLET | Refills: 0 | Status: SHIPPED | OUTPATIENT
Start: 2025-05-12

## 2025-05-12 RX ORDER — QUETIAPINE FUMARATE 50 MG/1
50 TABLET, FILM COATED ORAL AT BEDTIME
Qty: 30 TABLET | Refills: 0 | Status: SHIPPED | OUTPATIENT
Start: 2025-05-12

## 2025-05-21 ENCOUNTER — OFFICE VISIT (OUTPATIENT)
Dept: FAMILY MEDICINE | Facility: CLINIC | Age: 80
End: 2025-05-21
Payer: COMMERCIAL

## 2025-05-21 VITALS
TEMPERATURE: 97.3 F | HEIGHT: 66 IN | WEIGHT: 127 LBS | HEART RATE: 72 BPM | SYSTOLIC BLOOD PRESSURE: 120 MMHG | BODY MASS INDEX: 20.41 KG/M2 | OXYGEN SATURATION: 92 % | RESPIRATION RATE: 10 BRPM | DIASTOLIC BLOOD PRESSURE: 82 MMHG

## 2025-05-21 DIAGNOSIS — Z00.01 ENCOUNTER FOR GENERAL ADULT MEDICAL EXAMINATION WITH ABNORMAL FINDINGS: Primary | ICD-10-CM

## 2025-05-21 DIAGNOSIS — E44.0 MODERATE PROTEIN-CALORIE MALNUTRITION: ICD-10-CM

## 2025-05-21 DIAGNOSIS — Z13.6 SCREENING FOR CARDIOVASCULAR CONDITION: ICD-10-CM

## 2025-05-21 DIAGNOSIS — Z79.899 CONTROLLED SUBSTANCE AGREEMENT SIGNED: ICD-10-CM

## 2025-05-21 DIAGNOSIS — G47.00 PERSISTENT INSOMNIA: ICD-10-CM

## 2025-05-21 DIAGNOSIS — G50.0 TRIGEMINAL NEURALGIA: ICD-10-CM

## 2025-05-21 DIAGNOSIS — Z78.0 ASYMPTOMATIC POSTMENOPAUSAL STATUS: ICD-10-CM

## 2025-05-21 DIAGNOSIS — R51.9 FACIAL PAIN, ACUTE: ICD-10-CM

## 2025-05-21 DIAGNOSIS — G51.39 FACIAL NERVE SPASM: ICD-10-CM

## 2025-05-21 DIAGNOSIS — F11.20 CONTINUOUS OPIOID DEPENDENCE (H): ICD-10-CM

## 2025-05-21 DIAGNOSIS — Z23 NEED FOR VACCINATION: ICD-10-CM

## 2025-05-21 DIAGNOSIS — G40.909 SEIZURE DISORDER (H): ICD-10-CM

## 2025-05-21 DIAGNOSIS — Z13.1 SCREENING FOR DIABETES MELLITUS: ICD-10-CM

## 2025-05-21 DIAGNOSIS — E78.2 MIXED HYPERLIPIDEMIA: ICD-10-CM

## 2025-05-21 LAB
EST. AVERAGE GLUCOSE BLD GHB EST-MCNC: 126 MG/DL
HBA1C MFR BLD: 6 % (ref 0–5.6)

## 2025-05-21 PROCEDURE — 99214 OFFICE O/P EST MOD 30 MIN: CPT | Mod: 25 | Performed by: FAMILY MEDICINE

## 2025-05-21 PROCEDURE — 1125F AMNT PAIN NOTED PAIN PRSNT: CPT | Performed by: FAMILY MEDICINE

## 2025-05-21 PROCEDURE — 83735 ASSAY OF MAGNESIUM: CPT | Performed by: FAMILY MEDICINE

## 2025-05-21 PROCEDURE — 3074F SYST BP LT 130 MM HG: CPT | Performed by: FAMILY MEDICINE

## 2025-05-21 PROCEDURE — 96372 THER/PROPH/DIAG INJ SC/IM: CPT | Performed by: FAMILY MEDICINE

## 2025-05-21 PROCEDURE — 36415 COLL VENOUS BLD VENIPUNCTURE: CPT | Performed by: FAMILY MEDICINE

## 2025-05-21 PROCEDURE — 80053 COMPREHEN METABOLIC PANEL: CPT | Performed by: FAMILY MEDICINE

## 2025-05-21 PROCEDURE — 3044F HG A1C LEVEL LT 7.0%: CPT | Performed by: FAMILY MEDICINE

## 2025-05-21 PROCEDURE — G0439 PPPS, SUBSEQ VISIT: HCPCS | Performed by: FAMILY MEDICINE

## 2025-05-21 PROCEDURE — 80061 LIPID PANEL: CPT | Performed by: FAMILY MEDICINE

## 2025-05-21 PROCEDURE — 3079F DIAST BP 80-89 MM HG: CPT | Performed by: FAMILY MEDICINE

## 2025-05-21 PROCEDURE — 83036 HEMOGLOBIN GLYCOSYLATED A1C: CPT | Performed by: FAMILY MEDICINE

## 2025-05-21 RX ORDER — LEVETIRACETAM 750 MG/1
750 TABLET ORAL 2 TIMES DAILY
Status: SHIPPED
Start: 2025-05-21

## 2025-05-21 RX ORDER — KETOROLAC TROMETHAMINE 30 MG/ML
30 INJECTION, SOLUTION INTRAMUSCULAR; INTRAVENOUS ONCE
Status: COMPLETED | OUTPATIENT
Start: 2025-05-21 | End: 2025-05-21

## 2025-05-21 RX ORDER — BACLOFEN 10 MG/1
10 TABLET ORAL 3 TIMES DAILY
Qty: 30 TABLET | Refills: 1 | Status: SHIPPED | OUTPATIENT
Start: 2025-05-21

## 2025-05-21 RX ADMIN — KETOROLAC TROMETHAMINE 30 MG: 30 INJECTION, SOLUTION INTRAMUSCULAR; INTRAVENOUS at 14:19

## 2025-05-21 SDOH — HEALTH STABILITY: PHYSICAL HEALTH: ON AVERAGE, HOW MANY DAYS PER WEEK DO YOU ENGAGE IN MODERATE TO STRENUOUS EXERCISE (LIKE A BRISK WALK)?: 0 DAYS

## 2025-05-21 ASSESSMENT — PATIENT HEALTH QUESTIONNAIRE - PHQ9
10. IF YOU CHECKED OFF ANY PROBLEMS, HOW DIFFICULT HAVE THESE PROBLEMS MADE IT FOR YOU TO DO YOUR WORK, TAKE CARE OF THINGS AT HOME, OR GET ALONG WITH OTHER PEOPLE: NOT DIFFICULT AT ALL
SUM OF ALL RESPONSES TO PHQ QUESTIONS 1-9: 7
SUM OF ALL RESPONSES TO PHQ QUESTIONS 1-9: 7

## 2025-05-21 ASSESSMENT — SOCIAL DETERMINANTS OF HEALTH (SDOH): HOW OFTEN DO YOU GET TOGETHER WITH FRIENDS OR RELATIVES?: MORE THAN THREE TIMES A WEEK

## 2025-05-21 ASSESSMENT — PAIN SCALES - GENERAL: PAINLEVEL_OUTOF10: SEVERE PAIN (10)

## 2025-05-21 NOTE — PATIENT INSTRUCTIONS
Take tylenol #3 with baclofen to help with pain and spasm   You can take 1 tylenol( 325mg) with 1 tylenol #3 every 6 hour  Our goal not to exceed Tylenol more than 4000/day    Toradol injection given in the clinic today to help with the acute pain which looks like seems to be helping    Urgent referral to neurologist as you have not had follow-up with the neurologist for a while    Rest of the medication no change  We have made 3-month follow-up appointment for you    Mara Murillo MD     Patient Education   Preventive Care Advice   This is general advice given by our system to help you stay healthy. However, your care team may have specific advice just for you. Please talk to your care team about your preventive care needs.  Nutrition  Eat 5 or more servings of fruits and vegetables each day.  Try wheat bread, brown rice and whole grain pasta (instead of white bread, rice, and pasta).  Get enough calcium and vitamin D. Check the label on foods and aim for 100% of the RDA (recommended daily allowance).  Lifestyle  Exercise at least 150 minutes each week  (30 minutes a day, 5 days a week).  Do muscle strengthening activities 2 days a week. These help control your weight and prevent disease.  No smoking.  Wear sunscreen to prevent skin cancer.  Have a dental exam and cleaning every 6 months.  Yearly exams  See your health care team every year to talk about:  Any changes in your health.  Any medicines your care team has prescribed.  Preventive care, family planning, and ways to prevent chronic diseases.  Shots (vaccines)   HPV shots (up to age 26), if you've never had them before.  Hepatitis B shots (up to age 59), if you've never had them before.  COVID-19 shot: Get this shot when it's due.  Flu shot: Get a flu shot every year.  Tetanus shot: Get a tetanus shot every 10 years.  Pneumococcal, hepatitis A, and RSV shots: Ask your care team if you need these based on your risk.  Shingles shot (for age 50 and up)  General  health tests  Diabetes screening:  Starting at age 35, Get screened for diabetes at least every 3 years.  If you are younger than age 35, ask your care team if you should be screened for diabetes.  Cholesterol test: At age 39, start having a cholesterol test every 5 years, or more often if advised.  Bone density scan (DEXA): At age 50, ask your care team if you should have this scan for osteoporosis (brittle bones).  Hepatitis C: Get tested at least once in your life.  STIs (sexually transmitted infections)  Before age 24: Ask your care team if you should be screened for STIs.  After age 24: Get screened for STIs if you're at risk. You are at risk for STIs (including HIV) if:  You are sexually active with more than one person.  You don't use condoms every time.  You or a partner was diagnosed with a sexually transmitted infection.  If you are at risk for HIV, ask about PrEP medicine to prevent HIV.  Get tested for HIV at least once in your life, whether you are at risk for HIV or not.  Cancer screening tests  Cervical cancer screening: If you have a cervix, begin getting regular cervical cancer screening tests starting at age 21.  Breast cancer scan (mammogram): If you've ever had breasts, begin having regular mammograms starting at age 40. This is a scan to check for breast cancer.  Colon cancer screening: It is important to start screening for colon cancer at age 45.  Have a colonoscopy test every 10 years (or more often if you're at risk) Or, ask your provider about stool tests like a FIT test every year or Cologuard test every 3 years.  To learn more about your testing options, visit:   .  For help making a decision, visit:   https://bit.ly/zc21393.  Prostate cancer screening test: If you have a prostate, ask your care team if a prostate cancer screening test (PSA) at age 55 is right for you.  Lung cancer screening: If you are a current or former smoker ages 50 to 80, ask your care team if ongoing lung cancer  screenings are right for you.  For informational purposes only. Not to replace the advice of your health care provider. Copyright   2023 [facility]. All rights reserved. Clinically reviewed by the [facility]. DEQ [id number] - REV 01/24.

## 2025-05-21 NOTE — PROGRESS NOTES
Preventive Care Visit  [facility] KAMILA Murillo MD, Family Medicine  May 21, 2025    Julisa was seen today for medicare visit.    Diagnoses and all orders for this visit:    Encounter for general adult medical examination with abnormal findings    Trigeminal neuralgia  Comments:  eating and yawning sleeping all getting worse , taking tylenol #3 4 times per day   added beclofen to be used as needed  Orders:  -     Adult Neurology  Referral; Future    Facial pain, acute  -     ketorolac (TORADOL) injection 30 mg  -     Adult Neurology  Referral; Future    Continuous opioid dependence (H)  Comments:  like to take 2 tylenol every 6 hr adv total dose per day only 4000 mg  Orders:  -     [id number] - Urine Drug Confirmation Panel (Comprehensive); Future  -     Comprehensive metabolic panel (BMP + Alb, Alk Phos, ALT, AST, Total. Bili, TP); Future  -     [id number] - Urine Drug Confirmation Panel (Comprehensive)  -     Comprehensive metabolic panel (BMP + Alb, Alk Phos, ALT, AST, Total. Bili, TP)    Screening for cardiovascular condition  -     Lipid Profile; Future  -     Cancel: Lipid Profile    Mixed hyperlipidemia  -     Lipid panel reflex to direct LDL Non-fasting; Future  -     Lipid panel reflex to direct LDL Non-fasting    Asymptomatic postmenopausal status  -     DEXA HIP/PELVIS/SPINE - Future; Future    Seizure disorder (H)  -     Comprehensive metabolic panel (BMP + Alb, Alk Phos, ALT, AST, Total. Bili, TP); Future  -     levETIRAcetam (KEPPRA) 750 MG tablet; Take 1 tablet (750 mg) by mouth 2 times daily.  -     Comprehensive metabolic panel (BMP + Alb, Alk Phos, ALT, AST, Total. Bili, TP)    Persistent insomnia  Comments:  trial of remeron for both insomnia and depression, as patient didn't like the side effect of effexor     Screening for diabetes mellitus  -     Hemoglobin A1c; Future  -     Hemoglobin A1c    Moderate protein-calorie malnutrition  -     Magnesium; Future  -      Magnesium    Need for vaccination  -     zoster vaccine recombinant adjuvanted (SHINGRIX) injection; Inject 0.5 mLs into the muscle once for 1 dose. Pharmacist administered  -     RSV vaccine, bivalent, ABRYSVO, injection; Inject 0.5 mLs into the muscle once for 1 dose. Pharmacist administered    Facial nerve spasm  -     baclofen (LIORESAL) 10 MG tablet; Take 1 tablet (10 mg) by mouth 3 times daily.    Controlled substance agreement signed 5/21/25    Other orders  -     COVID-19 12+ (PFIZER); Future  -     PRIMARY CARE FOLLOW-UP SCHEDULING; Future  -     PRIMARY CARE FOLLOW-UP SCHEDULING; Future     The longitudinal plan of care for the diagnosis(es)/condition(s) as documented were addressed during this visit. Due to the added complexity in care, I will continue to support Julisa in the subsequent management and with ongoing continuity of care.  Patient Instructions   Take tylenol #3 with baclofen to help with pain and spasm   You can take 1 tylenol( 325mg) with 1 tylenol #3 every 6 hour  Our goal not to exceed Tylenol more than 4000/day    Toradol injection given in the clinic today to help with the acute pain which looks like seems to be helping    Urgent referral to neurologist as you have not had follow-up with the neurologist for a while    Rest of the medication no change  We have made 3-month follow-up appointment for you    Mara Murillo MD       Ladarius Egan is a 80 year old, presenting for the following:  Medicare Visit (AWV)        5/21/2025     1:58 PM   Additional Questions   Roomed by Kyleigh HUGHES MA          History of Present Illness       Reason for visit:  Face pain  check electrolytes and levels for infection     Wt Readings from Last 4 Encounters:   05/21/25 57.6 kg (127 lb)   02/03/25 57.6 kg (127 lb)   08/28/24 43.6 kg (96 lb 3.2 oz)   08/19/24 45.3 kg (99 lb 12.8 oz)       Advance Care Planning    Discussed advance care planning with patient; informed AVS has link to Honoring  Choices.        5/21/2025   General Health   How would you rate your overall physical health? (!) FAIR   Feel stress (tense, anxious, or unable to sleep) Not at all         5/21/2025   Nutrition   Diet: Regular (no restrictions)         5/21/2025   Exercise   Days per week of moderate/strenous exercise 0 days   (!) EXERCISE CONCERN      5/21/2025   Social Factors   Frequency of gathering with friends or relatives More than three times a week   Worry food won't last until get money to buy more No   Food not last or not have enough money for food? No   Do you have housing? (Housing is defined as stable permanent housing and does not include staying outside in a car, in a tent, in an abandoned building, in an overnight shelter, or couch-surfing.) Yes   Are you worried about losing your housing? No   Lack of transportation? No   Unable to get utilities (heat,electricity)? No         5/21/2025   Fall Risk   Fallen 2 or more times in the past year? No   Trouble with walking or balance? Yes   Reason Gait Speed Test Not Completed Patient does not tolerate an upright or standing position (e.g. wheelchair)          5/21/2025   Activities of Daily Living- Home Safety   Needs help with the following daily activites Transportation    Shopping    Laundry    Money management   Safety concerns in the home None of the above       Multiple values from one day are sorted in reverse-chronological order         5/21/2025   Dental   Dentist two times every year? (!) NO         5/21/2025   Hearing Screening   Hearing concerns? (!) I FEEL THAT PEOPLE ARE MUMBLING OR NOT SPEAKING CLEARLY.    (!) I NEED TO ASK PEOPLE TO SPEAK UP OR REPEAT THEMSELVES.    (!) IT'S HARD TO FOLLOW A CONVERSATION IN A NOISY RESTAURANT OR CROWDED ROOM.    (!) TROUBLE UNDERSTANDING SOFT OR WHISPERED SPEECH.    (!) TROUBLE UNDERSTANDING SPEECH ON THE TELEPHONE       Multiple values from one day are sorted in reverse-chronological order         5/21/2025   Driving  Risk Screening   Patient/family members have concerns about driving No         2025   General Alertness/Fatigue Screening   Have you been more tired than usual lately? (!) YES         2025   Urinary Incontinence Screening   Bothered by leaking urine in past 6 months No       Today's PHQ-9 Score:       2025     1:42 PM   PHQ-9 SCORE   PHQ-9 Total Score MyChart 7 (Mild depression)   PHQ-9 Total Score 7        Patient-reported         2025   Substance Use   Alcohol more than 3/day or more than 7/wk No   Do you have a current opioid prescription? (!) YES   How severe/bad is pain from 1 to 10? 10/10   Do you use any other substances recreationally? No       Social History     Tobacco Use    Smoking status: Former     Current packs/day: 0.00     Average packs/day: 1 pack/day for 50.0 years (50.0 ttl pk-yrs)     Types: Cigarettes     Start date: 1964     Quit date: 2014     Years since quitting: 10.5     Passive exposure: Never    Smokeless tobacco: Never   Vaping Use    Vaping status: Never Used   Substance Use Topics    Alcohol use: No    Drug use: No          Mammogram Screening - After age 74- determine frequency with patient based on health status, life expectancy and patient goals      Fracture Risk Assessment Tool  Link to Frax Calculator  Use the information below to complete the Frax calculator  : 1945  Sex: female  Weight (kg): 57.6 kg (actual weight)  Height (cm): 167.6 cm  Previous Fragility Fracture:  No  History of parent with fractured hip:  No  Current Smoking:  No  Patient has been on glucocorticoids for more than 3 months (5mg/day or more): No  Rheumatoid Arthritis on Problem List:  No  Secondary Osteoporosis on Problem List:  No  Consumes 3 or more units of alcohol per day: No  Femoral Neck BMD (g/cm2)            Reviewed and updated as needed this visit by Provider   Tobacco  Allergies  Meds  Problems  Med Hx  Surg Hx  Fam Hx            OB History   No  "obstetric history on file.     Current providers sharing in care for this patient include:  Patient Care Team:  Mara Murillo MD as PCP - General (Family Medicine)  Mara Murillo MD as Assigned PCP  Tien Roper MD as Fellow (Gastroenterology)  Mara Murillo MD as Assigned Pain Medication Provider    The following health maintenance items are reviewed in Epic and correct as of today:  Health Maintenance   Topic Date Due    DEXA  Never done    COPD ACTION PLAN  Never done    ZOSTER IMMUNIZATION (2 of 3) 01/02/2008    RSV VACCINE (1 - 1-dose 75+ series) Never done    URINE DRUG SCREEN  02/17/2023    LIPID  05/19/2023    COVID-19 Vaccine (6 - 2024-25 season) 09/01/2024    CONTROLLED SUBSTANCE AGREEMENT FOR CHRONIC PAIN MANAGEMENT  01/11/2025    INFLUENZA VACCINE (Season Ended) 09/01/2025    YUE ASSESSMENT  02/03/2026    ANNUAL REVIEW OF HM ORDERS  02/03/2026    MEDICARE ANNUAL WELLNESS VISIT  05/21/2026    FALL RISK ASSESSMENT  05/21/2026    PHQ-9  05/21/2026    DTAP/TDAP/TD IMMUNIZATION (4 - Td or Tdap) 05/10/2028    DIABETES SCREENING  05/21/2028    ADVANCE CARE PLANNING  01/15/2029    COLORECTAL CANCER SCREENING  01/19/2029    SPIROMETRY  Completed    DEPRESSION ACTION PLAN  Completed    Pneumococcal Vaccine: 50+ Years  Completed    HPV IMMUNIZATION  Aged Out    MENINGITIS IMMUNIZATION  Aged Out    HEPATITIS A IMMUNIZATION  Discontinued    LUNG CANCER SCREENING  Discontinued         Review of Systems  Constitutional, HEENT, cardiovascular, pulmonary, GI, ,  skin, endocrine and psych systems are negative, except as otherwise noted.     Objective    Exam  /82   Pulse 72   Temp 97.3  F (36.3  C) (Temporal)   Resp 10   Ht 1.676 m (5' 5.98\")   Wt 57.6 kg (127 lb)   SpO2 92%   BMI 20.51 kg/m     Estimated body mass index is 20.51 kg/m  as calculated from the following:    Height as of this encounter: 1.676 m (5' 5.98\").    Weight as of this encounter: 57.6 kg (127 lb).    Physical Exam  GENERAL: " alert and no distress.  Holding left side of her face  EYES: Eyes grossly normal to inspection, PERRL and conjunctivae and sclerae normal  HENT: ear canals earwax both side of the ear .  Broken teeth    NECK: no adenopathy, no asymmetry, masses, or scars  RESP: lungs clear to auscultation - no rales, rhonchi or wheezes  MS: Sitting in wheelchair             5/21/2025   Mini Cog   Mini-Cog Not Completed (choose reason) Patient declines              Signed Electronically by: Mara Murillo MD

## 2025-05-21 NOTE — LETTER

## 2025-05-22 ENCOUNTER — PATIENT OUTREACH (OUTPATIENT)
Dept: CARE COORDINATION | Facility: CLINIC | Age: 80
End: 2025-05-22
Payer: COMMERCIAL

## 2025-05-22 ENCOUNTER — RESULTS FOLLOW-UP (OUTPATIENT)
Dept: FAMILY MEDICINE | Facility: CLINIC | Age: 80
End: 2025-05-22

## 2025-05-22 LAB
ALBUMIN SERPL BCG-MCNC: 3.8 G/DL (ref 3.5–5.2)
ALP SERPL-CCNC: 138 U/L (ref 40–150)
ALT SERPL W P-5'-P-CCNC: 34 U/L (ref 0–50)
ANION GAP SERPL CALCULATED.3IONS-SCNC: 9 MMOL/L (ref 7–15)
AST SERPL W P-5'-P-CCNC: 32 U/L (ref 0–45)
BILIRUB SERPL-MCNC: 0.2 MG/DL
BUN SERPL-MCNC: 25 MG/DL (ref 8–23)
CALCIUM SERPL-MCNC: 9.9 MG/DL (ref 8.8–10.4)
CHLORIDE SERPL-SCNC: 104 MMOL/L (ref 98–107)
CHOLEST SERPL-MCNC: 231 MG/DL
CREAT SERPL-MCNC: 0.82 MG/DL (ref 0.51–0.95)
CREAT UR-MCNC: 96 MG/DL
EGFRCR SERPLBLD CKD-EPI 2021: 72 ML/MIN/1.73M2
FASTING STATUS PATIENT QL REPORTED: ABNORMAL
FASTING STATUS PATIENT QL REPORTED: ABNORMAL
GLUCOSE SERPL-MCNC: 111 MG/DL (ref 70–99)
HCO3 SERPL-SCNC: 29 MMOL/L (ref 22–29)
HDLC SERPL-MCNC: 75 MG/DL
LDLC SERPL CALC-MCNC: 136 MG/DL
MAGNESIUM SERPL-MCNC: 2 MG/DL (ref 1.7–2.3)
NONHDLC SERPL-MCNC: 156 MG/DL
POTASSIUM SERPL-SCNC: 4.6 MMOL/L (ref 3.4–5.3)
PROT SERPL-MCNC: 7 G/DL (ref 6.4–8.3)
SODIUM SERPL-SCNC: 142 MMOL/L (ref 135–145)
TRIGL SERPL-MCNC: 101 MG/DL

## 2025-05-24 ENCOUNTER — HOSPITAL ENCOUNTER (OUTPATIENT)
Facility: HOSPITAL | Age: 80
Setting detail: OBSERVATION
Discharge: NURSING, REHAB OR LONG-TERM CARE | End: 2025-05-28
Attending: EMERGENCY MEDICINE | Admitting: EMERGENCY MEDICINE
Payer: COMMERCIAL

## 2025-05-24 ENCOUNTER — APPOINTMENT (OUTPATIENT)
Dept: CT IMAGING | Facility: HOSPITAL | Age: 80
End: 2025-05-24
Attending: EMERGENCY MEDICINE
Payer: COMMERCIAL

## 2025-05-24 DIAGNOSIS — R07.9 CHEST PAIN, UNSPECIFIED TYPE: ICD-10-CM

## 2025-05-24 DIAGNOSIS — R10.33 PERIUMBILICAL ABDOMINAL PAIN: ICD-10-CM

## 2025-05-24 DIAGNOSIS — R62.7 ADULT FAILURE TO THRIVE: ICD-10-CM

## 2025-05-24 DIAGNOSIS — G89.4 CHRONIC PAIN SYNDROME: ICD-10-CM

## 2025-05-24 DIAGNOSIS — G50.0 TRIGEMINAL NEURALGIA: ICD-10-CM

## 2025-05-24 DIAGNOSIS — H10.32 ACUTE BACTERIAL CONJUNCTIVITIS OF LEFT EYE: ICD-10-CM

## 2025-05-24 DIAGNOSIS — E78.00 PURE HYPERCHOLESTEROLEMIA: Primary | ICD-10-CM

## 2025-05-24 DIAGNOSIS — G51.8 FACIAL NEURALGIA: ICD-10-CM

## 2025-05-24 DIAGNOSIS — R33.8 ACUTE URINARY RETENTION: ICD-10-CM

## 2025-05-24 DIAGNOSIS — R09.02 HYPOXIA: ICD-10-CM

## 2025-05-24 DIAGNOSIS — F11.20 CONTINUOUS OPIOID DEPENDENCE (H): ICD-10-CM

## 2025-05-24 DIAGNOSIS — R47.89 WORD FINDING DIFFICULTY: ICD-10-CM

## 2025-05-24 LAB
ALBUMIN SERPL BCG-MCNC: 3.9 G/DL (ref 3.5–5.2)
ALBUMIN UR-MCNC: NEGATIVE MG/DL
ALP SERPL-CCNC: 156 U/L (ref 40–150)
ALT SERPL W P-5'-P-CCNC: 40 U/L (ref 0–50)
ANION GAP SERPL CALCULATED.3IONS-SCNC: 15 MMOL/L (ref 7–15)
APPEARANCE UR: CLEAR
APTT PPP: 28 SECONDS (ref 22–38)
AST SERPL W P-5'-P-CCNC: 30 U/L (ref 0–45)
BASOPHILS # BLD AUTO: 0 10E3/UL (ref 0–0.2)
BASOPHILS NFR BLD AUTO: 1 %
BILIRUB DIRECT SERPL-MCNC: 0.08 MG/DL (ref 0–0.3)
BILIRUB SERPL-MCNC: 0.2 MG/DL
BILIRUB UR QL STRIP: NEGATIVE
BUN SERPL-MCNC: 18.1 MG/DL (ref 8–23)
C PNEUM DNA SPEC QL NAA+PROBE: NOT DETECTED
CALCIUM SERPL-MCNC: 9.4 MG/DL (ref 8.8–10.4)
CHLORIDE SERPL-SCNC: 105 MMOL/L (ref 98–107)
CHOLEST SERPL-MCNC: 251 MG/DL
COLOR UR AUTO: YELLOW
CREAT SERPL-MCNC: 0.64 MG/DL (ref 0.51–0.95)
EGFRCR SERPLBLD CKD-EPI 2021: 89 ML/MIN/1.73M2
EOSINOPHIL # BLD AUTO: 0 10E3/UL (ref 0–0.7)
EOSINOPHIL NFR BLD AUTO: 0 %
ERYTHROCYTE [DISTWIDTH] IN BLOOD BY AUTOMATED COUNT: 13.8 % (ref 10–15)
FLUAV H1 2009 PAND RNA SPEC QL NAA+PROBE: NOT DETECTED
FLUAV H1 RNA SPEC QL NAA+PROBE: NOT DETECTED
FLUAV H3 RNA SPEC QL NAA+PROBE: NOT DETECTED
FLUAV RNA SPEC QL NAA+PROBE: NOT DETECTED
FLUBV RNA SPEC QL NAA+PROBE: NOT DETECTED
GLUCOSE BLDC GLUCOMTR-MCNC: 110 MG/DL (ref 70–99)
GLUCOSE BLDC GLUCOMTR-MCNC: 135 MG/DL (ref 70–99)
GLUCOSE BLDC GLUCOMTR-MCNC: 95 MG/DL (ref 70–99)
GLUCOSE SERPL-MCNC: 94 MG/DL (ref 70–99)
GLUCOSE UR STRIP-MCNC: NEGATIVE MG/DL
HADV DNA SPEC QL NAA+PROBE: NOT DETECTED
HCO3 SERPL-SCNC: 25 MMOL/L (ref 22–29)
HCOV PNL SPEC NAA+PROBE: NOT DETECTED
HCT VFR BLD AUTO: 53.4 % (ref 35–47)
HDLC SERPL-MCNC: 73 MG/DL
HGB BLD-MCNC: 16.3 G/DL (ref 11.7–15.7)
HGB UR QL STRIP: NEGATIVE
HMPV RNA SPEC QL NAA+PROBE: NOT DETECTED
HPIV1 RNA SPEC QL NAA+PROBE: NOT DETECTED
HPIV2 RNA SPEC QL NAA+PROBE: NOT DETECTED
HPIV3 RNA SPEC QL NAA+PROBE: NOT DETECTED
HPIV4 RNA SPEC QL NAA+PROBE: NOT DETECTED
IMM GRANULOCYTES # BLD: 0 10E3/UL
IMM GRANULOCYTES NFR BLD: 0 %
INR PPP: 0.99 (ref 0.85–1.15)
INR PPP: NORMAL
KETONES UR STRIP-MCNC: 40 MG/DL
L PNEUMO1 AG UR QL IA: NEGATIVE
LDLC SERPL CALC-MCNC: 160 MG/DL
LEUKOCYTE ESTERASE UR QL STRIP: NEGATIVE
LIPASE SERPL-CCNC: 17 U/L (ref 13–60)
LYMPHOCYTES # BLD AUTO: 1 10E3/UL (ref 0.8–5.3)
LYMPHOCYTES NFR BLD AUTO: 13 %
M PNEUMO DNA SPEC QL NAA+PROBE: NOT DETECTED
MCH RBC QN AUTO: 26.8 PG (ref 26.5–33)
MCHC RBC AUTO-ENTMCNC: 30.5 G/DL (ref 31.5–36.5)
MCV RBC AUTO: 88 FL (ref 78–100)
MONOCYTES # BLD AUTO: 0.4 10E3/UL (ref 0–1.3)
MONOCYTES NFR BLD AUTO: 5 %
MUCOUS THREADS #/AREA URNS LPF: PRESENT /LPF
NEUTROPHILS # BLD AUTO: 6.4 10E3/UL (ref 1.6–8.3)
NEUTROPHILS NFR BLD AUTO: 82 %
NITRATE UR QL: NEGATIVE
NONHDLC SERPL-MCNC: 178 MG/DL
NRBC # BLD AUTO: 0 10E3/UL
NRBC BLD AUTO-RTO: 0 /100
PH UR STRIP: 5.5 [PH] (ref 5–7)
PLATELET # BLD AUTO: 205 10E3/UL (ref 150–450)
POTASSIUM SERPL-SCNC: 3.8 MMOL/L (ref 3.4–5.3)
PROT SERPL-MCNC: 7.4 G/DL (ref 6.4–8.3)
PROTHROMBIN TIME: 13.4 SECONDS (ref 11.8–14.8)
PROTHROMBIN TIME: NORMAL S
RBC # BLD AUTO: 6.09 10E6/UL (ref 3.8–5.2)
RBC URINE: 2 /HPF
RSV RNA SPEC QL NAA+PROBE: NOT DETECTED
RSV RNA SPEC QL NAA+PROBE: NOT DETECTED
RV+EV RNA SPEC QL NAA+PROBE: NOT DETECTED
S PNEUM AG SPEC QL: NEGATIVE
SODIUM SERPL-SCNC: 145 MMOL/L (ref 135–145)
SP GR UR STRIP: 1.02 (ref 1–1.03)
SPECIMEN TYPE: NORMAL
TRIGL SERPL-MCNC: 89 MG/DL
TROPONIN T SERPL HS-MCNC: 17 NG/L
TROPONIN T SERPL HS-MCNC: 18 NG/L
UROBILINOGEN UR STRIP-MCNC: NORMAL MG/DL
WBC # BLD AUTO: 7.8 10E3/UL (ref 4–11)
WBC URINE: <1 /HPF

## 2025-05-24 PROCEDURE — 99223 1ST HOSP IP/OBS HIGH 75: CPT | Performed by: STUDENT IN AN ORGANIZED HEALTH CARE EDUCATION/TRAINING PROGRAM

## 2025-05-24 PROCEDURE — 80048 BASIC METABOLIC PNL TOTAL CA: CPT | Performed by: EMERGENCY MEDICINE

## 2025-05-24 PROCEDURE — G0378 HOSPITAL OBSERVATION PER HR: HCPCS

## 2025-05-24 PROCEDURE — 85730 THROMBOPLASTIN TIME PARTIAL: CPT | Performed by: EMERGENCY MEDICINE

## 2025-05-24 PROCEDURE — 93005 ELECTROCARDIOGRAM TRACING: CPT | Performed by: EMERGENCY MEDICINE

## 2025-05-24 PROCEDURE — 85025 COMPLETE CBC W/AUTO DIFF WBC: CPT | Performed by: EMERGENCY MEDICINE

## 2025-05-24 PROCEDURE — 258N000003 HC RX IP 258 OP 636: Performed by: STUDENT IN AN ORGANIZED HEALTH CARE EDUCATION/TRAINING PROGRAM

## 2025-05-24 PROCEDURE — 36415 COLL VENOUS BLD VENIPUNCTURE: CPT | Performed by: EMERGENCY MEDICINE

## 2025-05-24 PROCEDURE — 96361 HYDRATE IV INFUSION ADD-ON: CPT

## 2025-05-24 PROCEDURE — 250N000009 HC RX 250: Performed by: STUDENT IN AN ORGANIZED HEALTH CARE EDUCATION/TRAINING PROGRAM

## 2025-05-24 PROCEDURE — 93005 ELECTROCARDIOGRAM TRACING: CPT | Performed by: STUDENT IN AN ORGANIZED HEALTH CARE EDUCATION/TRAINING PROGRAM

## 2025-05-24 PROCEDURE — 81001 URINALYSIS AUTO W/SCOPE: CPT | Performed by: EMERGENCY MEDICINE

## 2025-05-24 PROCEDURE — 87486 CHLMYD PNEUM DNA AMP PROBE: CPT | Performed by: STUDENT IN AN ORGANIZED HEALTH CARE EDUCATION/TRAINING PROGRAM

## 2025-05-24 PROCEDURE — 82962 GLUCOSE BLOOD TEST: CPT

## 2025-05-24 PROCEDURE — 94640 AIRWAY INHALATION TREATMENT: CPT

## 2025-05-24 PROCEDURE — 96374 THER/PROPH/DIAG INJ IV PUSH: CPT

## 2025-05-24 PROCEDURE — 87899 AGENT NOS ASSAY W/OPTIC: CPT | Performed by: STUDENT IN AN ORGANIZED HEALTH CARE EDUCATION/TRAINING PROGRAM

## 2025-05-24 PROCEDURE — 82248 BILIRUBIN DIRECT: CPT | Performed by: EMERGENCY MEDICINE

## 2025-05-24 PROCEDURE — 85610 PROTHROMBIN TIME: CPT | Performed by: EMERGENCY MEDICINE

## 2025-05-24 PROCEDURE — 250N000013 HC RX MED GY IP 250 OP 250 PS 637: Performed by: STUDENT IN AN ORGANIZED HEALTH CARE EDUCATION/TRAINING PROGRAM

## 2025-05-24 PROCEDURE — 999N000156 HC STATISTIC RCP CONSULT EA 30 MIN

## 2025-05-24 PROCEDURE — 250N000011 HC RX IP 250 OP 636: Performed by: EMERGENCY MEDICINE

## 2025-05-24 PROCEDURE — 83690 ASSAY OF LIPASE: CPT | Performed by: EMERGENCY MEDICINE

## 2025-05-24 PROCEDURE — 51702 INSERT TEMP BLADDER CATH: CPT

## 2025-05-24 PROCEDURE — 71250 CT THORAX DX C-: CPT

## 2025-05-24 PROCEDURE — 99285 EMERGENCY DEPT VISIT HI MDM: CPT | Mod: 25

## 2025-05-24 PROCEDURE — 84484 ASSAY OF TROPONIN QUANT: CPT | Performed by: EMERGENCY MEDICINE

## 2025-05-24 PROCEDURE — 83718 ASSAY OF LIPOPROTEIN: CPT | Performed by: STUDENT IN AN ORGANIZED HEALTH CARE EDUCATION/TRAINING PROGRAM

## 2025-05-24 PROCEDURE — 70450 CT HEAD/BRAIN W/O DYE: CPT

## 2025-05-24 RX ORDER — BACLOFEN 10 MG/1
10 TABLET ORAL 3 TIMES DAILY
Status: DISCONTINUED | OUTPATIENT
Start: 2025-05-24 | End: 2025-05-25

## 2025-05-24 RX ORDER — IPRATROPIUM BROMIDE AND ALBUTEROL SULFATE 2.5; .5 MG/3ML; MG/3ML
3 SOLUTION RESPIRATORY (INHALATION) EVERY 4 HOURS PRN
Status: DISCONTINUED | OUTPATIENT
Start: 2025-05-24 | End: 2025-05-28 | Stop reason: HOSPADM

## 2025-05-24 RX ORDER — TOPIRAMATE 25 MG/1
50 TABLET, FILM COATED ORAL AT BEDTIME
Status: DISCONTINUED | OUTPATIENT
Start: 2025-05-24 | End: 2025-05-28 | Stop reason: HOSPADM

## 2025-05-24 RX ORDER — ONDANSETRON 2 MG/ML
4 INJECTION INTRAMUSCULAR; INTRAVENOUS EVERY 6 HOURS PRN
Status: DISCONTINUED | OUTPATIENT
Start: 2025-05-24 | End: 2025-05-28 | Stop reason: HOSPADM

## 2025-05-24 RX ORDER — SODIUM CHLORIDE 9 MG/ML
INJECTION, SOLUTION INTRAVENOUS CONTINUOUS
Status: DISCONTINUED | OUTPATIENT
Start: 2025-05-24 | End: 2025-05-25

## 2025-05-24 RX ORDER — LORAZEPAM 2 MG/ML
0.5 INJECTION INTRAMUSCULAR ONCE
Status: COMPLETED | OUTPATIENT
Start: 2025-05-24 | End: 2025-05-24

## 2025-05-24 RX ORDER — ATORVASTATIN CALCIUM 40 MG/1
40 TABLET, FILM COATED ORAL EVERY EVENING
Status: DISCONTINUED | OUTPATIENT
Start: 2025-05-24 | End: 2025-05-28 | Stop reason: HOSPADM

## 2025-05-24 RX ORDER — AMOXICILLIN 250 MG
1 CAPSULE ORAL 2 TIMES DAILY
COMMUNITY

## 2025-05-24 RX ORDER — MIRTAZAPINE 15 MG/1
15 TABLET, FILM COATED ORAL AT BEDTIME
Status: DISCONTINUED | OUTPATIENT
Start: 2025-05-24 | End: 2025-05-28 | Stop reason: HOSPADM

## 2025-05-24 RX ORDER — ACETAMINOPHEN 650 MG/1
650 SUPPOSITORY RECTAL EVERY 4 HOURS PRN
Status: DISCONTINUED | OUTPATIENT
Start: 2025-05-24 | End: 2025-05-28 | Stop reason: HOSPADM

## 2025-05-24 RX ORDER — ERYTHROMYCIN 5 MG/G
0.5 OINTMENT OPHTHALMIC 4 TIMES DAILY
Qty: 3.5 G | Refills: 0 | Status: SHIPPED | OUTPATIENT
Start: 2025-05-24 | End: 2025-05-31

## 2025-05-24 RX ORDER — GABAPENTIN 100 MG/1
100 CAPSULE ORAL DAILY
Status: DISCONTINUED | OUTPATIENT
Start: 2025-05-25 | End: 2025-05-28 | Stop reason: HOSPADM

## 2025-05-24 RX ORDER — ACETAMINOPHEN 325 MG/10.15ML
650 LIQUID ORAL EVERY 4 HOURS PRN
Status: DISCONTINUED | OUTPATIENT
Start: 2025-05-24 | End: 2025-05-28 | Stop reason: HOSPADM

## 2025-05-24 RX ORDER — NEOMYCIN SULFATE, POLYMYXIN B SULFATE AND DEXAMETHASONE 3.5; 10000; 1 MG/ML; [USP'U]/ML; MG/ML
1 SUSPENSION/ DROPS OPHTHALMIC EVERY 6 HOURS SCHEDULED
Status: DISCONTINUED | OUTPATIENT
Start: 2025-05-24 | End: 2025-05-28 | Stop reason: HOSPADM

## 2025-05-24 RX ORDER — FERROUS SULFATE 325(65) MG
325 TABLET ORAL
Status: DISCONTINUED | OUTPATIENT
Start: 2025-05-25 | End: 2025-05-28 | Stop reason: HOSPADM

## 2025-05-24 RX ORDER — IPRATROPIUM BROMIDE AND ALBUTEROL SULFATE 2.5; .5 MG/3ML; MG/3ML
3 SOLUTION RESPIRATORY (INHALATION)
Status: DISCONTINUED | OUTPATIENT
Start: 2025-05-24 | End: 2025-05-24

## 2025-05-24 RX ORDER — NORTRIPTYLINE HYDROCHLORIDE 50 MG/1
50 CAPSULE ORAL AT BEDTIME
Status: DISCONTINUED | OUTPATIENT
Start: 2025-05-24 | End: 2025-05-28 | Stop reason: HOSPADM

## 2025-05-24 RX ORDER — ACETAMINOPHEN 325 MG/1
650 TABLET ORAL EVERY 4 HOURS PRN
Status: DISCONTINUED | OUTPATIENT
Start: 2025-05-24 | End: 2025-05-28 | Stop reason: HOSPADM

## 2025-05-24 RX ORDER — GABAPENTIN 300 MG/1
300 CAPSULE ORAL AT BEDTIME
Status: DISCONTINUED | OUTPATIENT
Start: 2025-05-24 | End: 2025-05-28 | Stop reason: HOSPADM

## 2025-05-24 RX ORDER — OXCARBAZEPINE 150 MG/1
450 TABLET, FILM COATED ORAL DAILY
Status: DISCONTINUED | OUTPATIENT
Start: 2025-05-24 | End: 2025-05-25

## 2025-05-24 RX ORDER — QUETIAPINE FUMARATE 25 MG/1
50 TABLET, FILM COATED ORAL AT BEDTIME
Status: DISCONTINUED | OUTPATIENT
Start: 2025-05-24 | End: 2025-05-28 | Stop reason: HOSPADM

## 2025-05-24 RX ORDER — ONDANSETRON 4 MG/1
4 TABLET, ORALLY DISINTEGRATING ORAL EVERY 6 HOURS PRN
Status: DISCONTINUED | OUTPATIENT
Start: 2025-05-24 | End: 2025-05-28 | Stop reason: HOSPADM

## 2025-05-24 RX ADMIN — TOPIRAMATE 50 MG: 25 TABLET, FILM COATED ORAL at 21:58

## 2025-05-24 RX ADMIN — OXCARBAZEPINE 450 MG: 150 TABLET, FILM COATED ORAL at 18:49

## 2025-05-24 RX ADMIN — QUETIAPINE FUMARATE 50 MG: 25 TABLET ORAL at 21:58

## 2025-05-24 RX ADMIN — BACLOFEN 10 MG: 10 TABLET ORAL at 22:02

## 2025-05-24 RX ADMIN — NEOMYCIN SULFATE, POLYMYXIN B SULFATE AND DEXAMETHASONE 1 DROP: 3.5; 10000; 1 SUSPENSION/ DROPS OPHTHALMIC at 21:59

## 2025-05-24 RX ADMIN — LORAZEPAM 0.5 MG: 2 INJECTION INTRAMUSCULAR; INTRAVENOUS at 12:22

## 2025-05-24 RX ADMIN — GABAPENTIN 300 MG: 300 CAPSULE ORAL at 21:57

## 2025-05-24 RX ADMIN — SODIUM CHLORIDE: 0.9 INJECTION, SOLUTION INTRAVENOUS at 17:50

## 2025-05-24 RX ADMIN — IPRATROPIUM BROMIDE AND ALBUTEROL SULFATE 3 ML: .5; 3 SOLUTION RESPIRATORY (INHALATION) at 19:15

## 2025-05-24 RX ADMIN — LEVETIRACETAM 750 MG: 500 TABLET, FILM COATED ORAL at 21:58

## 2025-05-24 RX ADMIN — ATORVASTATIN CALCIUM 40 MG: 40 TABLET, FILM COATED ORAL at 21:58

## 2025-05-24 RX ADMIN — NORTRIPTYLINE HYDROCHLORIDE 50 MG: 50 CAPSULE ORAL at 21:58

## 2025-05-24 RX ADMIN — MIRTAZAPINE 15 MG: 15 TABLET, FILM COATED ORAL at 21:58

## 2025-05-24 ASSESSMENT — ACTIVITIES OF DAILY LIVING (ADL)
ADLS_ACUITY_SCORE: 66
ADLS_ACUITY_SCORE: 64
ADLS_ACUITY_SCORE: 66
ADLS_ACUITY_SCORE: 64
DEPENDENT_IADLS:: CLEANING;COOKING;LAUNDRY;SHOPPING;MEAL PREPARATION;MEDICATION MANAGEMENT;MONEY MANAGEMENT;TRANSPORTATION
ADLS_ACUITY_SCORE: 58
ADLS_ACUITY_SCORE: 58
ADLS_ACUITY_SCORE: 66
ADLS_ACUITY_SCORE: 64
ADLS_ACUITY_SCORE: 66
ADLS_ACUITY_SCORE: 64
ADLS_ACUITY_SCORE: 58
ADLS_ACUITY_SCORE: 66
ADLS_ACUITY_SCORE: 58

## 2025-05-24 ASSESSMENT — COLUMBIA-SUICIDE SEVERITY RATING SCALE - C-SSRS
6. HAVE YOU EVER DONE ANYTHING, STARTED TO DO ANYTHING, OR PREPARED TO DO ANYTHING TO END YOUR LIFE?: NO
2. HAVE YOU ACTUALLY HAD ANY THOUGHTS OF KILLING YOURSELF IN THE PAST MONTH?: NO
1. IN THE PAST MONTH, HAVE YOU WISHED YOU WERE DEAD OR WISHED YOU COULD GO TO SLEEP AND NOT WAKE UP?: NO

## 2025-05-24 NOTE — CONSULTS
"Care Management Initial Consult    General Information  Assessment completed with: Patient, Children, Leopoldo answered a few questions and then told me to go away and call her daughter instead. Called Alissa daughter via phone.  Type of CM/SW Visit: Initial Assessment    Primary Care Provider verified and updated as needed: Yes   Readmission within the last 30 days: no previous admission in last 30 days      Reason for Consult: discharge planning  Advance Care Planning: Advance Care Planning Reviewed: no concerns identified          Communication Assessment  Patient's communication style: spoken language (English or Bilingual)          Living Environment:   People in home: alone     Current living Arrangements: house (\"Ramp to enter the house and then no steps she has to use inside the house either.\")      Able to return to prior arrangements: yes       Family/Social Support:  Care provided by: self, child(geetha)  Provides care for: no one, unable/limited ability to care for self  Marital Status:   Support system: Children, Other (specify) (grandkids. \"Daughter Alissa lives a few blocks away and she checks on her mom 3 times a day and other family check on her also\".)          Description of Support System: Supportive, Involved    Support Assessment: Adequate family and caregiver support, Adequate social supports, Patient communicates needs well met    Current Resources:   Patient receiving home care services: No (\"She has had home care in the past, but when PT helps her she won't do the exercises on her own so it isn't worth it\".)        Community Resources: None  Equipment currently used at home: wheelchair, manual, walker, rolling, raised toilet seat, shower chair, grab bar, toilet, grab bar, tub/shower (\"She always uses her wheelchair at home and outside the home. She is able to propel it on her own with her legs and arms. She pivots to the wheelchair. She can walk with the walker, but it is her choice not to " "and only uses the wheelchair\".)  Supplies currently used at home: Other (\"glasses\")    Employment/Financial:  Employment Status: retired     Employment/ Comments: \"no  history\"  Financial Concerns: none   Referral to Financial Worker: No       Does the patient's insurance plan have a 3 day qualifying hospital stay waiver?  Yes     Which insurance plan 3 day waiver is available? Alternative insurance waiver    Will the waiver be used for post-acute placement? Undetermined at this time    Lifestyle & Psychosocial Needs:  Social Drivers of Health     Food Insecurity: Low Risk  (5/21/2025)    Food Insecurity     Within the past 12 months, did you worry that your food would run out before you got money to buy more?: No     Within the past 12 months, did the food you bought just not last and you didn t have money to get more?: No   Depression: Not at risk (5/21/2025)    PHQ-2     PHQ-2 Score: 2   Housing Stability: Low Risk  (5/21/2025)    Housing Stability     Do you have housing? : Yes     Are you worried about losing your housing?: No   Tobacco Use: Medium Risk (5/21/2025)    Patient History     Smoking Tobacco Use: Former     Smokeless Tobacco Use: Never     Passive Exposure: Never   Financial Resource Strain: Low Risk  (5/21/2025)    Financial Resource Strain     Within the past 12 months, have you or your family members you live with been unable to get utilities (heat, electricity) when it was really needed?: No   Alcohol Use: Not on file   Transportation Needs: Low Risk  (5/21/2025)    Transportation Needs     Within the past 12 months, has lack of transportation kept you from medical appointments, getting your medicines, non-medical meetings or appointments, work, or from getting things that you need?: No   Physical Activity: Unknown (5/21/2025)    Exercise Vital Sign     Days of Exercise per Week: 0 days     Minutes of Exercise per Session: Not on file   Interpersonal Safety: Not on file   Stress: " "No Stress Concern Present (5/21/2025)    Citizen of [place] of Occupational Health - Occupational Stress Questionnaire     Feeling of Stress : Not at all   Social Connections: Unknown (5/21/2025)    Social Connection and Isolation Panel [NHANES]     Frequency of Communication with Friends and Family: Not on file     Frequency of Social Gatherings with Friends and Family: More than three times a week     Attends Rastafarian Services: Not on file     Active Member of Clubs or Organizations: Not on file     Attends Club or Organization Meetings: Not on file     Marital Status: Not on file   Health Literacy: Not on file       Functional Status:  Prior to admission patient needed assistance:   Dependent ADLs:: Wheelchair-independent, Ambulation-walker, Independent  Dependent IADLs:: Cleaning, Cooking, Laundry, Shopping, Meal Preparation, Medication Management, Money Management, Transportation (\"family helps\")  Assesssment of Functional Status:  (unknown at this time)    Mental Health Status:  Mental Health Status: Past Concern  Mental Health Management: Medication    Chemical Dependency Status:  Chemical Dependency Status: No Current Concerns             Values/Beliefs:  Spiritual, Cultural Beliefs, Rastafarian Practices, Values that affect care: no       Cultural/Rastafarian Practices Patient Routinely Participates In: ceremony, prayer  Values/Beliefs Comment: Religious    Discussed  Partnership in Safe Discharge Planning  document with patient/family: No    Additional Information:  CMA completed: Signed off    Julisa lives in a house alone. The house is a single family home and has a \"ramp to enter the house and then there are no steps she has to use inside the house either.\"    Daughter Alissa states, \"I only live a few blocks away and I physically check on my mom 3 times a day and other family check on her also. She has had home care in the past, but when PT helps her she won't do the exercises on her own so it isn't worth " "it. We don't want home care again. Also she does fine at the TCU and can leave the TCU walking, but when she gets home she is lazy and doesn't want to walk. She only uses her wheelchair and that is her choice so TCU isn't worth it either\".    \"She always uses her wheelchair at home and outside the home. She is able to propel it on her own with her legs and arms. She pivots to the wheelchair. She can walk with the walker, but it is her choice not to and only uses the wheelchair\".    Ride: Family    MOON was given and discussed. All questions were answered.    Writer verified patient demographics and updated any changes needed in the patient chart.    Next Steps:   Medical plan/delay: Medical clearance.    Dispo: Home with family continuing to help daily. Per daughter, \"we have tried TCU and Home Care before and it doesn't help her so we don't need either of those things\".    CM to do: signing off.    Ning Dougherty RN    "

## 2025-05-24 NOTE — ED NOTES
On RA at 86%. Placed on NC but is a mouth breather and changed to oximask at 3 L which is 30-60%. Notified provider

## 2025-05-24 NOTE — PLAN OF CARE
Goal Outcome Evaluation:      Plan of Care Reviewed With: patient, child          Outcome Evaluation: Home with family continuing to help daily.

## 2025-05-24 NOTE — ED NOTES
After placing narayan catheter, patient states to writer that she could here the MD and nurses talking when patient was hypoxic but patient could not respond. Then patient requested to be sedated to sleep. Writer informed patient that the hospital does not sedate people to sleep. Patient responded that she believes the hospital does. Patient states she still will not have MRI due to claustrophobia. MD notified. O2 sats now 95% on RA.

## 2025-05-24 NOTE — DISCHARGE INSTRUCTIONS
As discussed, all of the labs and tests including a CT scan that you did agree to are normal today.  You declined an MRI which would look for something like a stroke.  At this time, there is no indication for admission to the hospital but if you change your mind come back for MRI imaging or if any new neurologic symptoms get worse.

## 2025-05-24 NOTE — PHARMACY-ADMISSION MEDICATION HISTORY
Pharmacy Intern Admission Medication History    Admission medication history is complete. The information provided in this note is only as accurate as the sources available at the time of the update.    Information Source(s): Family member and CareEverywhere/SureScripts via phone    Pertinent Information: Daughter manages medications. Night meds *not* taken yesterday. Morning/noon meds taken yesterday.    Changes made to PTA medication list:  Added: None  Deleted: pantoprazole  Changed: senna-doc (scheduled)    Allergies reviewed with patient and updates made in EHR: yes    Medication History Completed By: EDWIN BASILIO 5/24/2025 5:00 PM    PTA Med List   Medication Sig Last Dose/Taking    acetaminophen-codeine (TYLENOL #3) 300-30 MG per tablet TAKE 1 TABLET BY MOUTH EVERY 6 HOURS AS NEEDED FOR SEVERE PAIN 5/23/2025 Noon    baclofen (LIORESAL) 10 MG tablet Take 1 tablet (10 mg) by mouth 3 times daily. 5/23/2025 Noon    ferrous sulfate (FEROSUL) 325 (65 Fe) MG tablet Take 1 tablet (325 mg) by mouth daily (with breakfast). 5/23/2025 Morning    gabapentin (NEURONTIN) 100 MG capsule Take 1 capsule (100 mg) by mouth daily. 5/23/2025 Morning    gabapentin (NEURONTIN) 300 MG capsule Take 1 capsule (300 mg) by mouth at bedtime. Past Week Bedtime    levETIRAcetam (KEPPRA) 750 MG tablet Take 1 tablet (750 mg) by mouth 2 times daily. 5/23/2025 Morning    loperamide (IMODIUM) 2 MG capsule Take 1 capsule (2 mg) by mouth 4 times daily as needed for diarrhea Taking As Needed    menthol-zinc oxide (CALMOSEPTINE) 0.44-20.6 % OINT ointment Apply topically 4 times daily as needed for skin protection (for sores on buttox from sitting) Taking As Needed    METAMUCIL FIBER PO Take 1 capsule by mouth daily 5/23/2025 Morning    mirtazapine (REMERON) 15 MG tablet TAKE 1 TABLET(15 MG) BY MOUTH AT BEDTIME Past Week Bedtime    Multiple Vitamin (MULTIVITAMIN) TABS Take 1 tablet by mouth daily. 5/23/2025 Morning    nortriptyline (PAMELOR) 50 MG  capsule Take 1 capsule (50 mg) by mouth at bedtime. Past Week Bedtime    OXcarbazepine (TRILEPTAL) 150 MG tablet TAKE 3 TABLETS(450 MG) BY MOUTH DAILY Past Week Bedtime    potassium chloride elin ER (KLOR-CON M20) 20 MEQ CR tablet Take 1 tablet (20 mEq) by mouth daily. 5/23/2025 Morning    QUEtiapine (SEROQUEL) 50 MG tablet TAKE 1 TABLET(50 MG) BY MOUTH AT BEDTIME Past Week Bedtime    senna-docusate (SENOKOT-S/PERICOLACE) 8.6-50 MG tablet Take 1 tablet by mouth 2 times daily. 5/23/2025 Morning    topiramate (TOPAMAX) 50 MG tablet TAKE 1 TABLET(50 MG) BY MOUTH AT BEDTIME Past Week Bedtime    Vitamin D3 50 mcg (2000 units) tablet Take 1 tablet (50 mcg) by mouth daily. 5/23/2025 Morning

## 2025-05-24 NOTE — ED NOTES
D: The patient had a large Black stool incontinent.  A: Pt cleaned, linens changed, cleansed  R: Pt awaiting MRI

## 2025-05-24 NOTE — ED NOTES
aware that pt is drowsy with saturations in the 80's (85%). Tried 2 L NC but is a mouth breather so placed on oxymask at 3 L. Shortly after HR dropped to 48, 47, 46, 45. Called for EKG. Spoke to Dr. Rainey at desk. Tried to take off oxygen but dropped to 89%

## 2025-05-24 NOTE — H&P
[facility]    History and Physical - Hospitalist Service       Date of Admission:  5/24/2025    Assessment & Plan    Assessment:  Julisa Chaney is an 80 year old female, history of HLD, migraines, trigeminal neuralgia and seizure disorder, who presents to this ED via walk-in for evaluation of word-finding difficulty.  Patient refusing MRI of the brain, CT head negative for acute process, was subsequently seen to be hypoxic, plan is to admit patient and monitor and give fluids and admitting to observation for acute hypoxia possibly self-induced, also for TIA/stroke workup if patient agrees for further evaluation.    Problem list and plan:  Acute hypoxia possibly self-induced  TIA/stroke workup  Patient presents to this ED via walk-in for evaluation of word-finding difficulty.   Per the patient's granddaughter, the patient has been struggling with facial pain for which she was seen in clinic 3 days ago.    She also has been having word-finding difficulties that the granddaughter noted seemed worse around 9pm last night; it is unclear when the word-finding difficulty first started.   No extremity weakness or numbness.   She also reports an episode of chest discomfort last night.  She also reports periumbilical abdominal pain since this morning.    Stroke code was not initially called because of the unknown duration or time of onset of symptoms  Intermittently was seen to be having some word finding difficulty but at at the time speech appeared to be clear and neuroexam was otherwise non focal  ED provider spoke to stroke neurology who agreed that patient is not at TNK candidate and advised MRI of the brain  Patient refused MRI of the brain as per ED provider but agreed to CT head which was negative for acute process  CT scan of the chest abdomen pelvis was also performed which was also negative for acute process  Please review imaging for full details  Initial plan was to discharge AGAINST  MEDICAL ADVICE as patient was refusing care but after discussion regarding the plan to discharge patient started having episodes of hypoxia, interestingly she had not had any hypoxia throughout the ED stay of over 6 hours  ED provider suspecting behavioral issues leading to acute hypoxia such as breath-holding  Patient also became uncooperative and refused to answer questions stating she is confused  Subsequently after plan for admission oxygen saturation improved to above 90% on room air  Patient also requesting to be sedated on multiple occasions  Patient has received a dose of Ativan initially in the ED which has been given more than 4 hours ago  Plan is to admit patient and monitor and give fluids and admitting to observation for acute hypoxia possibly self-induced, also for TIA/stroke workup if patient agrees for further evaluation.  Ordered DuoNeb  Ordered full viral panel, sputum culture, urine Legionella Streptococcus pneumonia antigen  Will not start on any antibiotics as low suspicion for any infectious etiology  Suspecting behavioral and psych issues as the cause of patient's symptoms  Follow-up echocardiogram  Monitor on telemetry monitoring  Follow-up neurology for further recommendations    Left eye conjunctivitis  High appeared to be placed on neomycin polymyxin dexamethasone ophthalmic solution    Hyperlipidemia  Total cholesterol of 251, LDL of 160, will be started on Lipitor    Elevated troponin most likely in setting of type II NSTEMI/demand ischemia  Troponin elevated but flat and downtrending, no current complaint of chest pain, repeat EKG for symptoms    Polycythemia secondary to hemoconcentration secondary to dehydration  Gentle IV hydration  Monitor CBC  Suspecting a drop in hemoglobin with fluid resuscitation    History of mood disorder  History of seizures  Continue Neurontin, Keppra, mirtazapine, nortriptyline, Trileptal, quetiapine, Topamax    DVT PPx  Intermittent pneumatic  compression    CODE STATUS  Full code as per discussion with the patient     Observation Goals: List all goals to be met before discharge home    , Free from ACUTE neuro deficits, Testing complete, Other: , Nurse to notify provider when observation goals have been met and patient is ready for discharge.  Diet: Regular Diet Adult  DVT Prophylaxis: Pneumatic Compression Devices  Abrams Catheter: PRESENT, indication:    Lines: None     Cardiac Monitoring: ACTIVE order. Indication: Stroke, acute (48 hours)  Code Status: Full Code    Clinically Significant Risk Factors Present on Admission                                 # Financial/Environmental Concerns: none         Disposition Plan     Medically Ready for Discharge: Anticipated in 2-4 Days     Saad J. Gondal, MD  Hospitalist Service  [facility]  Securely message with [employer] (more info)  Text page via [facility] Paging/Directory     ______________________________________________________________________    Chief Complaint   Word finding difficulty, chest discomfort, periumbilical abdominal discomfort    History is obtained from chart review    History of Present Illness   Julisa Chaney is an 80 year old female, history of HLD, migraines, trigeminal neuralgia and seizure disorder, who presents to this ED via walk-in for evaluation of word-finding difficulty. Per the patient's granddaughter, the patient has been struggling with facial pain for which she was seen in clinic 3 days ago.  She also has been having word-finding difficulties that the granddaughter noted seemed worse around 9pm last night; it is unclear when the word-finding difficulty first started. No extremity weakness or numbness. She also reports an episode of chest discomfort last night. She denies associated shortness of breath. She also reports periumbilical abdominal pain since this morning.  Stroke code was not initially called because of the unknown duration or time of onset of  symptoms, intermittently was seen to be having some word finding difficulty but at at the time speech appeared to be clear and neuroexam was otherwise nonfocal, ED provider spoke to stroke neurology who agreed that patient is not at TNK candidate and advised MRI of the brain, patient refused MRI of the brain as per ED provider but agreed to CT head which was negative for acute process, CT scan of the chest abdomen pelvis was also performed which was also negative for acute process, please review imaging for full details, initial plan was to discharge AGAINST MEDICAL ADVICE as patient was refusing care but after discussion regarding the plan to discharge patient started having episodes of hypoxia, interestingly she had not had any hypoxia throughout the ED stay of over 6 hours, ED provider suspecting behavioral issues leading to acute hypoxia such as breath-holding, patient also became uncooperative and refused to answer questions stating she is confused, subsequently after plan for admission oxygen saturation improved to above 90% on room air, patient also requesting to be sedated on multiple occasions, patient has received a dose of Ativan initially in the ED which has been given more than 4 hours ago, vitals otherwise fairly within normal limits, labs significant for elevated troponin which appear to be flat and downtrending, also has polycythemia most likely hemoconcentration which hopefully will resolve with fluid resuscitation, plan is to admit patient and monitor and give fluids and admitting to observation for acute hypoxia possibly self-induced, also for TIA/stroke workup if patient agrees for further evaluation.    Patient has also been refusing labs      Past Medical History    Past Medical History:   Diagnosis Date    Aspiration pneumonia (H) 02/2022    Depression     High cholesterol     Migraines     Pyloric ulcer, chronic     Sepsis (H) 08/10/2020    Trigeminal neuralgia        Past Surgical History    Past Surgical History:   Procedure Laterality Date    COLONOSCOPY N/A 1/19/2024    Procedure: COLONOSCOPY WITH RANDOM BIOPSIES;  Surgeon: Antonio Mcelroy MD, MD;  Location: [facility]    HYSTERECTOMY      IR GASTROSTOMY TUBE PERCUTANEOUS PLCMNT  8/16/2022    PICC TRIPLE LUMEN PLACEMENT  7/22/2022         NC ESOPHAGOGASTRODUODENOSCOPY TRANSORAL DIAGNOSTIC N/A 8/13/2020    Procedure: ESOPHAGOGASTRODUODENOSCOPY (EGD);  Surgeon: Nathan Smalls MD;  Location: [facility] [place];  Service: Gastroenterology    NC ESOPHAGOGASTRODUODENOSCOPY TRANSORAL DIAGNOSTIC N/A 8/14/2020    Procedure: ESOPHAGOGASTRODUODENOSCOPY (EGD), PYLORIC DILATION;  Surgeon: Nathan Smalls MD;  Location: [facility] [place];  Service: Gastroenterology    VENTRICULOSTOMY Left 7/31/2022    Procedure: LEFT FRONTAL VENTRICULOSTOMY;  Surgeon: Brady Heredia MD;  Location: [facility] COLONOSCOPY W/WO BRUSH/WASH N/A 8/13/2020    Procedure: COLONOSCOPY WITH POLYPECTOMY;  Surgeon: Nathan Smalls MD;  Location: [facility] [place];  Service: Gastroenterology       Prior to Admission Medications   Prior to Admission Medications   Prescriptions Last Dose Informant Patient Reported? Taking?   METAMUCIL FIBER PO 5/23/2025 Morning  Yes Yes   Sig: Take 1 capsule by mouth daily   Multiple Vitamin (MULTIVITAMIN) TABS 5/23/2025 Morning  No Yes   Sig: Take 1 tablet by mouth daily.   OXcarbazepine (TRILEPTAL) 150 MG tablet Past Week Bedtime  No Yes   Sig: TAKE 3 TABLETS(450 MG) BY MOUTH DAILY   QUEtiapine (SEROQUEL) 50 MG tablet Past Week Bedtime  No Yes   Sig: TAKE 1 TABLET(50 MG) BY MOUTH AT BEDTIME   Vitamin D3 50 mcg (2000 units) tablet 5/23/2025 Morning  No Yes   Sig: Take 1 tablet (50 mcg) by mouth daily.   acetaminophen-codeine (TYLENOL #3) 300-30 MG per tablet 5/23/2025 Noon  No Yes   Sig: TAKE 1 TABLET BY MOUTH EVERY 6 HOURS AS NEEDED FOR SEVERE PAIN   baclofen (LIORESAL) 10 MG tablet 5/23/2025 Noon  No Yes   Sig: Take 1 tablet (10 mg) by  mouth 3 times daily.   ferrous sulfate (FEROSUL) 325 (65 Fe) MG tablet 5/23/2025 Morning  No Yes   Sig: Take 1 tablet (325 mg) by mouth daily (with breakfast).   gabapentin (NEURONTIN) 100 MG capsule 5/23/2025 Morning  No Yes   Sig: Take 1 capsule (100 mg) by mouth daily.   gabapentin (NEURONTIN) 300 MG capsule Past Week Bedtime  No Yes   Sig: Take 1 capsule (300 mg) by mouth at bedtime.   levETIRAcetam (KEPPRA) 750 MG tablet 5/23/2025 Morning  No Yes   Sig: Take 1 tablet (750 mg) by mouth 2 times daily.   loperamide (IMODIUM) 2 MG capsule   No Yes   Sig: Take 1 capsule (2 mg) by mouth 4 times daily as needed for diarrhea   menthol-zinc oxide (CALMOSEPTINE) 0.44-20.6 % OINT ointment   Yes Yes   Sig: Apply topically 4 times daily as needed for skin protection (for sores on buttox from sitting)   mirtazapine (REMERON) 15 MG tablet Past Week Bedtime  No Yes   Sig: TAKE 1 TABLET(15 MG) BY MOUTH AT BEDTIME   nortriptyline (PAMELOR) 50 MG capsule Past Week Bedtime  No Yes   Sig: Take 1 capsule (50 mg) by mouth at bedtime.   potassium chloride elin ER (KLOR-CON M20) 20 MEQ CR tablet 5/23/2025 Morning  No Yes   Sig: Take 1 tablet (20 mEq) by mouth daily.   senna-docusate (SENOKOT-S/PERICOLACE) 8.6-50 MG tablet 5/23/2025 Morning  Yes Yes   Sig: Take 1 tablet by mouth 2 times daily.   topiramate (TOPAMAX) 50 MG tablet Past Week Bedtime  No Yes   Sig: TAKE 1 TABLET(50 MG) BY MOUTH AT BEDTIME      Facility-Administered Medications: None        Review of Systems    The 10 point Review of Systems is negative other than noted in the HPI or here. Word finding difficulty, chest discomfort, periumbilical abdominal discomfort    Physical Exam   Vital Signs: Temp: 98  F (36.7  C) Temp src: Oral BP: (!) 150/71 Pulse: 66   Resp: (!) 54 SpO2: 100 % O2 Device: None (Room air) Oxygen Delivery: 3 LPM  Weight: 0 lbs 0 oz    GENERAL: Patient was seen and examined at bedside with no acute distress, thin, lean, cachectic, chronically  ill-appearing and frail  HENT:  Head is normocephalic, atraumatic.  Sunken eyes, pale conjunctival mucosa, erythematous left eye  EYES:  Eyes have anicteric sclerae without conjunctival injection   LUNGS: Bilateral air entry, clear to auscultation bilaterally  CARDIOVASCULAR:  Regular rate and rhythm with no murmurs, gallops or rubs.  ABDOMEN:  Normal bowel sounds. Soft; nontender   EXT: no edema    SKIN:  No acute rashes.  Dry skin, poor skin turgor  NEUROLOGIC:  Grossly nonfocal.      Medical Decision Making       80 MINUTES SPENT BY ME on the date of service doing chart review, history, exam, documentation & further activities per the note.      Data     I have personally reviewed the following data over the past 24 hrs:    7.8  \   16.3 (H)   / 205     145 105 18.1 /  110 (H)   3.8 25 0.64 \     ALT: 40 AST: 30 AP: 156 (H) TBILI: 0.2   ALB: 3.9 TOT PROTEIN: 7.4 LIPASE: 17     Trop: 17 (H) BNP: N/A     INR:  0.99 PTT:  28   D-dimer:  N/A Fibrinogen:  N/A       Imaging results reviewed over the past 24 hrs:   Recent Results (from the past 24 hours)   CT Chest Abdomen Pelvis w/o Contrast    Narrative    EXAM: CT CHEST ABDOMEN PELVIS W/O CONTRAST  LOCATION: [facility]  DATE: 5/24/2025    INDICATION: chest and abdominal pain.  COMPARISON: CT abdomen pelvis 5/28/2024; CT chest 8/11/2024  TECHNIQUE: CT scan of the chest, abdomen, and pelvis was performed without IV contrast. Multiplanar reformats were obtained. Dose reduction techniques were used.   CONTRAST: None.    FINDINGS:   LUNGS AND PLEURA: Stable moderate emphysema. Mild tree-in-bud opacities with scattered areas of bronchiectasis again seen in the right upper, right middle and right lower lobes in similar distribution as prior infiltrates, compatible with postinfectious   scarring, with or without mild active infection. Scattered tree-in-bud opacities also seen in the left lower lobe. Mild scarring/atelectasis in the left lower lobe  and lingula. No consolidation, pleural effusion or pneumothorax.    MEDIASTINUM/AXILLAE: Normal.    CORONARY ARTERY CALCIFICATION: Moderate.    HEPATOBILIARY: Normal liver. Gallbladder is decompressed and again diffusely thick-walled, unchanged    PANCREAS: Normal.    SPLEEN: Normal.    ADRENAL GLANDS: Normal.    KIDNEYS/BLADDER: Normal.    BOWEL: No abnormal bowel thickening or bowel obstruction. Appendix is not visualized but no pericecal inflammation is present. No free fluid or free air. Moderate amount of stool seen within the rectum. A gastric fundal diverticulum is again noted.    LYMPH NODES: Normal.    VASCULATURE: Moderate aortoiliofemoral atherosclerotic calcifications. No abdominal aortic aneurysm.    PELVIC ORGANS: Status post hysterectomy. No abnormal adnexal masses.    MUSCULOSKELETAL: Moderate degenerative disc space narrowing from L4 to S1. Stable thoracic kyphosis. No suspicious osseous lesions or acute fractures.        Impression    IMPRESSION:    1.  Mild tree-in-bud opacities and bronchiectasis in the right upper, right middle and right lower lobes in areas of infiltrates on prior CT, compatible with mild postinfectious fibrosis with or without an element of chronic/indolent infection. Similar   mild tree-in-bud opacity also seen in the left lower lobe which may represent similar chronic/indolent process.    2.  Stable moderate emphysema.    3.  Persistent diffuse wall thickening of the decompressed gallbladder unchanged dating back to 5/28/2024, possibly representing diffuse adenomyomatosis.    4.  Moderate rectal stool burden.    5.  No acute process in the abdomen and pelvis.   Head CT w/o contrast    Narrative    EXAM: CT HEAD W/O CONTRAST  LOCATION: [facility]  DATE: 5/24/2025    INDICATION: word finding difficulty  COMPARISON: Head CT dated 01/17/2024.  TECHNIQUE: Routine CT Head without IV contrast. Multiplanar reformats. Dose reduction techniques were  used.    FINDINGS:  INTRACRANIAL CONTENTS: No intracranial hemorrhage, extraaxial collection, or mass effect.  No CT evidence of acute territorial infarct. 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 Stable area of encephalomalacia and gliosis in the   left superior frontal gyrus. Mild to moderate presumed chronic small vessel ischemic changes. Mild to moderate generalized volume loss. No hydrocephalus. Atherosclerotic calcifications of the carotid siphons and vertebral arteries.    VISUALIZED ORBITS/SINUSES/MASTOIDS: Rightward gaze. Prior bilateral cataract surgery. Visualized portions of the orbits are otherwise unremarkable. No paranasal sinus mucosal disease. No middle ear or mastoid effusion.    BONES/SOFT TISSUES: No acute abnormality. Left frontal kelsey hole.      Impression    IMPRESSION:  1.  No CT evidence for acute territorial infarction or intracranial hemorrhage.  2.  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 Stable small area of encephalomalacia and gliosis in the left superior frontal gyrus.  3.  Intracranial atherosclerosis.

## 2025-05-24 NOTE — ED NOTES
RN attempted to call Daughter Alissa to review MRI Checklist [phone]. No answer at this time. Pt has word finding difficulty at this time.

## 2025-05-24 NOTE — ED PROVIDER NOTES
Emergency Department Encounter     Evaluation Date & Time:   5/24/2025 10:47 AM    CHIEF COMPLAINT:  Aphasia and Pain      Triage Note:Family brought patient in from her own home. Has pain all over including chest pain, facial pain and abd pain. Difficulty finding words since last night, unknown time.    Patient also noting painful urination.    Also has a constant, baseline headache.         Impression and Plan       FINAL IMPRESSION:    ICD-10-CM    1. Word finding difficulty  R47.89       2. Chest pain, unspecified type  R07.9       3. Periumbilical abdominal pain  R10.33       4. Acute bacterial conjunctivitis of left eye  H10.32             ED COURSE & MEDICAL DECISION MAKING:  10:46 AM I met with the patient to obtain patient history and performed a physical exam. Discussed plan for ED work up including potential diagnostic studies and interventions.  12:07 PM I spoke with Stroke Neurology, Dr. Rod.  2:25 PM I spoke with the RN regarding her MRI - patient refusing MRI. I spoke with the patient and she refuses MRI for me as well. She is agreeable to head CT, which was ordered.         80 year old female, history of HLD, migraines, trigeminal neuralgia and seizure disorder, who presents for evaluation of multiple concerns, including word-finding difficulty that has been worse since last night. Unable to determine definitive time of onset with concern that it has been at least 24 hours, thus no stroke code was called.     Patient does intermittently have difficulties with finding words, however speech is clear with otherwise non-focal neuro exam.     I spoke with the Stroke Neurologist who agrees that patient is not a tenecteplase candidate and agrees with plan to obtain MRI imaging.     Patient also reports an episode of chest pain last night.     EKG performed and demonstrates NSR with subtle ST-T wave depression anterior leads with no ST-T wave elevation consistent with ACS or pericarditis.      Troponin elevated above reference cut-off for females (18) with delta troponin slightly down-trending (17); I do not suspect ACS.    Denies shortness of breath and I do not suspect PE.     Patient also reports periumbilical abdominal pain today with benign abdominal exam.    Labs remarkable for no leukocytosis (WBC 7.8) with polycythemia (Hb 16.3). She has no significant electrolyte derangements or renal impairment. No laboratory evidence of hepatitis, biliary obstruction or pancreatitis with mildly elevated alk phos (156).    CT chest / abdomen / pelvis (without IV contrast secondary to allergy) performed and demonstrated:  1.  Mild tree-in-bud opacities and bronchiectasis in the right lung in areas of infiltrates on prior CT, compatible with mild post-infectious fibrosis with or without an element of chronic / indolent infection. Similar mild tree-in-bud opacity also seen in the left lower lobe which may represent similar chronic / indolent process. No cough, fever or other URI symptoms to suggest active infection.  2.  Stable moderate emphysema.  3.  Persistent diffuse wall thickening of the decompressed gallbladder unchanged dating back to 5/28/2024, possibly representing diffuse adenomyomatosis.  4.  Moderate rectal stool burden.  5.  No acute process in the abdomen and pelvis.    Patient still has not had MRI imaging. I checked in with her RN and RN reported to me that the patient is refusing MRI. I spoke with the patient and she again refused MRI imaging, but is agreeable to head CT.    Head CT ordered and patient signed out to Dr. Rainey at time of shift change pending results. If head CT is negative and patient still refuses MRI imaging, consider AMA discharge. Could consider admission for further monitoring / observation, however if patient is refusing evaluation, I do not see much benefit in this option.    I did write a prescription for erythromycin ophthalmic ointment to treat left eye  conjunctivitis if patient discharges AMA.     Patient stable thus far in ED course.         At the conclusion of the encounter I discussed the results of all the tests and the disposition. The questions were answered. The patient acknowledged understanding and was agreeable with the care plan.    Medical Decision Making    History:  Obtained supplemental history:Supplemental history obtained?: Family Member/Significant Other  Reviewed external records: External records reviewed?: Outpatient Record: see HPI    External consultation:  Did you consider but not order tests?: Work up considered but not performed and documented in chart, if applicable  Did you interpret images independently?: Independent interpretation of ECG and images noted in documentation, when applicable.  Consultation discussion with other provider:Did you involve another provider (consultant, , pharmacy, etc.)?: I discussed the care with another health care provider, see documentation for details. Stroke Neurologist    Complicating factors:  Care impacted by chronic illness:seizure disorder, trigeminal neuralgia, migraine headaches and Hyperlipidemia  Care significantly affected by social determinants of health:N/A    Disposition considerations: Admission considered. Patient was signed out to the oncoming physician, disposition pending.    MIPS    Not Applicable    MEDICATIONS GIVEN IN THE EMERGENCY DEPARTMENT:  Medications   LORazepam (ATIVAN) injection 0.5 mg (0.5 mg Intravenous $Given 5/24/25 1222)       NEW PRESCRIPTIONS STARTED AT TODAY'S ED VISIT:  New Prescriptions    ERYTHROMYCIN (ROMYCIN) 5 MG/GM OPHTHALMIC OINTMENT    Place 0.5 inches Into the left eye 4 times daily for 7 days.       HPI     HPI     Julisa Chaney is an 80 year old female, history of HLD, migraines, trigeminal neuralgia and seizure disorder, who presents to this ED via walk-in for evaluation of word-finding difficulty.    Per the patient's granddaughter, the patient  has been struggling with facial pain for which she was seen in clinic 3 days ago.     She also has been having word-finding difficulties that the granddaughter noted seemed worse around 9pm last night; it is unclear when the word-finding difficulty first started.    The patient reports chronic headache. She denies recent falls. She is not anticoagulated. No extremity weakness or numbness.     She also reports an episode of chest pain last night. She denies associated shortness of breath.    She also reports periumbilical abdominal pain since this morning. No associated nausea / vomiting, but maybe has had some diarrhea.     No cough or fevers.     Grandchildren report that patient lives alone, but they check on her every couple of days.     Per chart review:  5/21/2025 - Patient presented to [facility] for evaluation of acute facial pain and spasms. Labs unremarkable except for slightly elevated blood glucose. Patient was given 30 mg Toradol injection in clinic with apparent relief and was told to take Tylenol with baclofen at home for the pain and spasms. Patient was urgently referred to neurologist, has not had neurology follow up in a while.    Medical History     Past Medical History:   Diagnosis Date    Aspiration pneumonia (H) 02/2022    Depression     High cholesterol     Migraines     Pyloric ulcer, chronic     Sepsis (H) 08/10/2020    Trigeminal neuralgia        erythromycin (ROMYCIN) 5 MG/GM ophthalmic ointment  acetaminophen-codeine (TYLENOL #3) 300-30 MG per tablet  baclofen (LIORESAL) 10 MG tablet  ferrous sulfate (FEROSUL) 325 (65 Fe) MG tablet  gabapentin (NEURONTIN) 100 MG capsule  gabapentin (NEURONTIN) 100 MG capsule  gabapentin (NEURONTIN) 300 MG capsule  levETIRAcetam (KEPPRA) 750 MG tablet  loperamide (IMODIUM) 2 MG capsule  menthol-zinc oxide (CALMOSEPTINE) 0.44-20.6 % OINT ointment  METAMUCIL FIBER PO  mirtazapine (REMERON) 15 MG tablet  Multiple Vitamin  (MULTIVITAMIN) TABS  nortriptyline (PAMELOR) 50 MG capsule  OXcarbazepine (TRILEPTAL) 150 MG tablet  pantoprazole (PROTONIX) 40 MG EC tablet  potassium chloride elin ER (KLOR-CON M20) 20 MEQ CR tablet  QUEtiapine (SEROQUEL) 50 MG tablet  senna-docusate (SENOKOT-S/PERICOLACE) 8.6-50 MG tablet  topiramate (TOPAMAX) 50 MG tablet  Vitamin D3 50 mcg (2000 units) tablet        Physical Exam     First Vitals:  Patient Vitals for the past 24 hrs:   BP Temp Temp src Pulse Resp SpO2   05/24/25 1437 -- -- -- 94 29 94 %   05/24/25 1427 -- -- -- 93 (!) 36 94 %   05/24/25 1243 -- -- -- -- -- 100 %   05/24/25 1155 -- -- -- 91 28 93 %   05/24/25 1151 -- -- -- 89 22 94 %   05/24/25 1100 139/66 -- -- 93 21 93 %   05/24/25 1038 (!) 146/70 98.2  F (36.8  C) Oral 100 24 94 %       PHYSICAL EXAM:   Physical Exam    GENERAL: Awake, alert.  In mild acute distress.   HEENT: Normocephalic, atraumatic. Pupils equal, round and reactive. Left conjunctival injection and mattering.  Patient not participatory with EOM testing. Normal posterior oropharynx. Patient declines to protrude her tongue.   NECK: No stridor.  PULMONARY: Symmetrical breath sounds without distress.  Lungs clear to auscultation bilaterally without wheezes, rhonchi or rales.  CARDIO: Borderline tachycardic rate with regular rhythm.  No significant murmur, rub or gallop.  Radial pulses strong and symmetrical.  ABDOMINAL: Abdomen soft, non-distended and non-tender to palpation.    EXTREMITIES: No lower extremity swelling or edema.      NEURO: Alert and oriented to person and place, but not to time.  Cranial nerves grossly intact, however patient not fully participatory with full testing.  Strength 5/5 BL upper and lower extremities with sensation to light touch grossly intact.   PSYCH: Normal mood and affect.      Results     LAB:  All pertinent labs reviewed and interpreted  Labs Ordered and Resulted from Time of ED Arrival to Time of ED Departure   TROPONIN T, HIGH SENSITIVITY  - Abnormal       Result Value    Troponin T, High Sensitivity 18 (*)    HEPATIC FUNCTION PANEL - Abnormal    Protein Total 7.4      Albumin 3.9      Bilirubin Total 0.2      Alkaline Phosphatase 156 (*)     AST 30      ALT 40      Bilirubin Direct 0.08     CBC WITH PLATELETS AND DIFFERENTIAL - Abnormal    WBC Count 7.8      RBC Count 6.09 (*)     Hemoglobin 16.3 (*)     Hematocrit 53.4 (*)     MCV 88      MCH 26.8      MCHC 30.5 (*)     RDW 13.8      Platelet Count 205      % Neutrophils 82      % Lymphocytes 13      % Monocytes 5      % Eosinophils 0      % Basophils 1      % Immature Granulocytes 0      NRBCs per 100 WBC 0      Absolute Neutrophils 6.4      Absolute Lymphocytes 1.0      Absolute Monocytes 0.4      Absolute Eosinophils 0.0      Absolute Basophils 0.0      Absolute Immature Granulocytes 0.0      Absolute NRBCs 0.0     TROPONIN T, HIGH SENSITIVITY - Abnormal    Troponin T, High Sensitivity 17 (*)    GLUCOSE BY METER - Normal    GLUCOSE BY METER POCT 95     BASIC METABOLIC PANEL - Normal    Sodium 145      Potassium 3.8      Chloride 105      Carbon Dioxide (CO2) 25      Anion Gap 15      Urea Nitrogen 18.1      Creatinine 0.64      GFR Estimate 89      Calcium 9.4      Glucose 94     LIPASE - Normal    Lipase 17     PARTIAL THROMBOPLASTIN TIME - Normal    aPTT 28     INR - Normal    INR 0.99      PT 13.4     INR    INR        PT       ROUTINE UA WITH MICROSCOPIC REFLEX TO CULTURE       RADIOLOGY:  CT Chest Abdomen Pelvis w/o Contrast   Final Result   IMPRESSION:      1.  Mild tree-in-bud opacities and bronchiectasis in the right upper, right middle and right lower lobes in areas of infiltrates on prior CT, compatible with mild postinfectious fibrosis with or without an element of chronic/indolent infection. Similar    mild tree-in-bud opacity also seen in the left lower lobe which may represent similar chronic/indolent process.      2.  Stable moderate emphysema.      3.  Persistent diffuse wall  thickening of the decompressed gallbladder unchanged dating back to 2024, possibly representing diffuse adenomyomatosis.      4.  Moderate rectal stool burden.      5.  No acute process in the abdomen and pelvis.      MR Brain w/o & w Contrast    (Results Pending)   MRA Brain ([place]) wo Contrast    (Results Pending)   MRA Neck (Carot[place]) wo & w Contrast    (Results Pending)   Head CT w/o contrast    (Results Pending)       EC2025, 11:16; NSR with rate of 94 bpm; normal intervals; normal conduction; subtle ST-T wave depression anterior leads with no ST-T wave elevation consistent with ACS or pericarditis; compared to previous EKG dated 2024, the subtle ST-T wave depression seen in the anterior leads is new    EKG independently reviewed and interpreted by Reshma Vásquez MD        I, Arnaud Mari, am serving as a scribe to document services personally performed by Reshma Vásquez MD based on my observation and the provider's statements to me. I, Reshma Vásquez MD attest that Arnaud Mari is acting in a scribe capacity, has observed my performance of the services and has documented them in accordance with my direction.    Reshma Vásquez MD  Emergency Medicine  [facility] EMERGENCY DEPARTMENT           Reshma Vásquez MD  [phone]

## 2025-05-24 NOTE — ED NOTES
EMERGENCY DEPARTMENT SIGN OUT NOTE        ED COURSE AND MEDICAL DECISION MAKING  Patient was signed out to me by Dr Reshma Vásquez at 2:10pm  2:31 PM I spoke with [facility] neurology.     In brief, Julisa Chaney is a 80 year old female who initially presented with aphasia. Unable to determine last known well.     At time of sign out, disposition was pending MRI/MRA. Follow-up with stroke neuro.    Update: Prior to initial provider departure, patient refused MRI.  She talked with her at length and the most she could get her agreed to was a plain head CT.  Head CT was obtained and is negative for anything like intracranial hemorrhage or obvious mass effect, but I reevaluated the patient and discussed that we cannot rule out something like a stroke with all of her symptoms today without MRI imaging.  She continues to refuse, stating that she would only agree to it if we would intubate her in the ED and I discussed that this would not be a possibility.  She notes that she would like to stay in the hospital indefinitely and I also discussed that this would not be a possibility.  Currently, I do not think we have any indication for admission otherwise, and she appears neurologically intact at my evaluation.  Will discharge AMA at this time.    Update: After our discussion and the plan to have the patient sign out AMA, she had not yet been discharged by nursing staff and then I was notified that she was having episodes of hypoxia.  Interestingly, she had had no hypoxia throughout her ED stay of over 6 hours so this was a new finding, and she has already had a CT of the chest abdomen and pelvis during this ED visit.  Patient also behaviorally seem to be uncooperative at this time stating that she was too confused to answer any questions.  I did look around the room and did not see any pill bottles so it does not seem like she had an ingestion, and her Ativan was 4 hours prior to this so I do not think this would be the cause.   I actually's expect that some of this was behavioral and from breath-holding as miraculously after I discussed the case with hospitalist for admission now she is back in the mid 90s on room air and there are notes documenting that she once again was requesting being sedated and admitted to the hospital to nursing staff, and was admitting that she could hear us but was just not answering questions.  This is a difficult situation, but at this time there are too many documented values of hypoxia for me to comfortably allow this patient to leave AMA and when she was acting so confused she would not have had capacity to do so at that time anyways.  I discussed the case with hospitalist and we will place her on observation.  She has been notified multiple times that we will not be sedating her, though she keeps being argumentative about this.    FINAL IMPRESSION    1. Word finding difficulty    2. Chest pain, unspecified type    3. Periumbilical abdominal pain    4. Acute bacterial conjunctivitis of left eye        ED MEDS  Medications   LORazepam (ATIVAN) injection 0.5 mg (0.5 mg Intravenous $Given 5/24/25 7878)       LAB  Labs Ordered and Resulted from Time of ED Arrival to Time of ED Departure   TROPONIN T, HIGH SENSITIVITY - Abnormal       Result Value    Troponin T, High Sensitivity 18 (*)    HEPATIC FUNCTION PANEL - Abnormal    Protein Total 7.4      Albumin 3.9      Bilirubin Total 0.2      Alkaline Phosphatase 156 (*)     AST 30      ALT 40      Bilirubin Direct 0.08     CBC WITH PLATELETS AND DIFFERENTIAL - Abnormal    WBC Count 7.8      RBC Count 6.09 (*)     Hemoglobin 16.3 (*)     Hematocrit 53.4 (*)     MCV 88      MCH 26.8      MCHC 30.5 (*)     RDW 13.8      Platelet Count 205      % Neutrophils 82      % Lymphocytes 13      % Monocytes 5      % Eosinophils 0      % Basophils 1      % Immature Granulocytes 0      NRBCs per 100 WBC 0      Absolute Neutrophils 6.4      Absolute Lymphocytes 1.0      Absolute  Monocytes 0.4      Absolute Eosinophils 0.0      Absolute Basophils 0.0      Absolute Immature Granulocytes 0.0      Absolute NRBCs 0.0     GLUCOSE BY METER - Normal    GLUCOSE BY METER POCT 95     BASIC METABOLIC PANEL - Normal    Sodium 145      Potassium 3.8      Chloride 105      Carbon Dioxide (CO2) 25      Anion Gap 15      Urea Nitrogen 18.1      Creatinine 0.64      GFR Estimate 89      Calcium 9.4      Glucose 94     LIPASE - Normal    Lipase 17     PARTIAL THROMBOPLASTIN TIME - Normal    aPTT 28     INR - Normal    INR 0.99      PT 13.4     INR    INR        PT       ROUTINE UA WITH MICROSCOPIC REFLEX TO CULTURE   TROPONIN T, HIGH SENSITIVITY       EKG      RADIOLOGY    CT Chest Abdomen Pelvis w/o Contrast   Final Result   IMPRESSION:      1.  Mild tree-in-bud opacities and bronchiectasis in the right upper, right middle and right lower lobes in areas of infiltrates on prior CT, compatible with mild postinfectious fibrosis with or without an element of chronic/indolent infection. Similar    mild tree-in-bud opacity also seen in the left lower lobe which may represent similar chronic/indolent process.      2.  Stable moderate emphysema.      3.  Persistent diffuse wall thickening of the decompressed gallbladder unchanged dating back to 5/28/2024, possibly representing diffuse adenomyomatosis.      4.  Moderate rectal stool burden.      5.  No acute process in the abdomen and pelvis.      MR Brain w/o & w Contrast    (Results Pending)   MRA Brain ([place]) wo Contrast    (Results Pending)   MRA Neck ([place]ot[place]) wo & w Contrast    (Results Pending)       DISCHARGE MEDS  New Prescriptions    ERYTHROMYCIN (ROMYCIN) 5 MG/GM OPHTHALMIC OINTMENT    Place 0.5 inches Into the left eye 4 times daily for 7 days.       Donna Rainey MD  [facility] EMERGENCY DEPARTMENT  [facility] [facility] [address]  [phone]         Donna Rainey MD  05/24/25 0831        Donna Rainey MD  05/24/25 4205

## 2025-05-24 NOTE — ED TRIAGE NOTES
Family brought patient in from her own home. Has pain all over including chest pain, facial pain and abd pain. Difficulty finding words since last night, unknown time.    Patient also noting painful urination.    Also has a constant, baseline headache.

## 2025-05-25 ENCOUNTER — APPOINTMENT (OUTPATIENT)
Dept: SPEECH THERAPY | Facility: HOSPITAL | Age: 80
End: 2025-05-25
Attending: STUDENT IN AN ORGANIZED HEALTH CARE EDUCATION/TRAINING PROGRAM
Payer: COMMERCIAL

## 2025-05-25 ENCOUNTER — APPOINTMENT (OUTPATIENT)
Dept: ULTRASOUND IMAGING | Facility: HOSPITAL | Age: 80
End: 2025-05-25
Attending: PSYCHIATRY & NEUROLOGY
Payer: COMMERCIAL

## 2025-05-25 ENCOUNTER — APPOINTMENT (OUTPATIENT)
Dept: CARDIOLOGY | Facility: HOSPITAL | Age: 80
End: 2025-05-25
Attending: STUDENT IN AN ORGANIZED HEALTH CARE EDUCATION/TRAINING PROGRAM
Payer: COMMERCIAL

## 2025-05-25 PROBLEM — E86.0 DEHYDRATION: Status: ACTIVE | Noted: 2023-10-22

## 2025-05-25 LAB
ANION GAP SERPL CALCULATED.3IONS-SCNC: 14 MMOL/L (ref 7–15)
ATRIAL RATE - MUSE: 64 BPM
ATRIAL RATE - MUSE: 94 BPM
BUN SERPL-MCNC: 18.1 MG/DL (ref 8–23)
CALCIUM SERPL-MCNC: 8.9 MG/DL (ref 8.8–10.4)
CHLORIDE SERPL-SCNC: 104 MMOL/L (ref 98–107)
CREAT SERPL-MCNC: 0.66 MG/DL (ref 0.51–0.95)
DIASTOLIC BLOOD PRESSURE - MUSE: 66 MMHG
DIASTOLIC BLOOD PRESSURE - MUSE: NORMAL MMHG
EGFRCR SERPLBLD CKD-EPI 2021: 88 ML/MIN/1.73M2
ERYTHROCYTE [DISTWIDTH] IN BLOOD BY AUTOMATED COUNT: 13.9 % (ref 10–15)
GLUCOSE BLDC GLUCOMTR-MCNC: 114 MG/DL (ref 70–99)
GLUCOSE BLDC GLUCOMTR-MCNC: 125 MG/DL (ref 70–99)
GLUCOSE BLDC GLUCOMTR-MCNC: 125 MG/DL (ref 70–99)
GLUCOSE SERPL-MCNC: 128 MG/DL (ref 70–99)
HCO3 SERPL-SCNC: 28 MMOL/L (ref 22–29)
HCT VFR BLD AUTO: 50.6 % (ref 35–47)
HGB BLD-MCNC: 15.6 G/DL (ref 11.7–15.7)
HOLD SPECIMEN: NORMAL
INTERPRETATION ECG - MUSE: NORMAL
INTERPRETATION ECG - MUSE: NORMAL
LEVETIRACETAM SERPL-MCNC: 21.3 ÂΜG/ML (ref 10–40)
LVEF ECHO: NORMAL
MAGNESIUM SERPL-MCNC: 1.9 MG/DL (ref 1.7–2.3)
MCH RBC QN AUTO: 26.7 PG (ref 26.5–33)
MCHC RBC AUTO-ENTMCNC: 30.8 G/DL (ref 31.5–36.5)
MCV RBC AUTO: 87 FL (ref 78–100)
P AXIS - MUSE: 52 DEGREES
P AXIS - MUSE: 67 DEGREES
PHOSPHATE SERPL-MCNC: 2.9 MG/DL (ref 2.5–4.5)
PLAT MORPH BLD: ABNORMAL
PLATELET # BLD AUTO: ABNORMAL 10*3/UL
POTASSIUM SERPL-SCNC: 3.6 MMOL/L (ref 3.4–5.3)
PR INTERVAL - MUSE: 168 MS
PR INTERVAL - MUSE: 192 MS
QRS DURATION - MUSE: 72 MS
QRS DURATION - MUSE: 74 MS
QT - MUSE: 348 MS
QT - MUSE: 442 MS
QTC - MUSE: 435 MS
QTC - MUSE: 455 MS
R AXIS - MUSE: 23 DEGREES
R AXIS - MUSE: 39 DEGREES
RBC # BLD AUTO: 5.85 10E6/UL (ref 3.8–5.2)
RBC MORPH BLD: ABNORMAL
SODIUM SERPL-SCNC: 146 MMOL/L (ref 135–145)
SYSTOLIC BLOOD PRESSURE - MUSE: 139 MMHG
SYSTOLIC BLOOD PRESSURE - MUSE: NORMAL MMHG
T AXIS - MUSE: 49 DEGREES
T AXIS - MUSE: 62 DEGREES
VENTRICULAR RATE- MUSE: 64 BPM
VENTRICULAR RATE- MUSE: 94 BPM
WBC # BLD AUTO: 10.4 10E3/UL (ref 4–11)

## 2025-05-25 PROCEDURE — G0378 HOSPITAL OBSERVATION PER HR: HCPCS

## 2025-05-25 PROCEDURE — 999N000127 HC STATISTIC PERIPHERAL IV START W US GUIDANCE

## 2025-05-25 PROCEDURE — 96361 HYDRATE IV INFUSION ADD-ON: CPT

## 2025-05-25 PROCEDURE — 80177 DRUG SCRN QUAN LEVETIRACETAM: CPT | Performed by: PSYCHIATRY & NEUROLOGY

## 2025-05-25 PROCEDURE — 99232 SBSQ HOSP IP/OBS MODERATE 35: CPT | Performed by: INTERNAL MEDICINE

## 2025-05-25 PROCEDURE — 80183 DRUG SCRN QUANT OXCARBAZEPIN: CPT | Performed by: PSYCHIATRY & NEUROLOGY

## 2025-05-25 PROCEDURE — 80201 ASSAY OF TOPIRAMATE: CPT | Performed by: PSYCHIATRY & NEUROLOGY

## 2025-05-25 PROCEDURE — 250N000013 HC RX MED GY IP 250 OP 250 PS 637: Performed by: INTERNAL MEDICINE

## 2025-05-25 PROCEDURE — 999N000208 ECHOCARDIOGRAM COMPLETE

## 2025-05-25 PROCEDURE — 84100 ASSAY OF PHOSPHORUS: CPT | Performed by: STUDENT IN AN ORGANIZED HEALTH CARE EDUCATION/TRAINING PROGRAM

## 2025-05-25 PROCEDURE — 36415 COLL VENOUS BLD VENIPUNCTURE: CPT | Performed by: PSYCHIATRY & NEUROLOGY

## 2025-05-25 PROCEDURE — 85048 AUTOMATED LEUKOCYTE COUNT: CPT | Performed by: STUDENT IN AN ORGANIZED HEALTH CARE EDUCATION/TRAINING PROGRAM

## 2025-05-25 PROCEDURE — 258N000003 HC RX IP 258 OP 636: Performed by: INTERNAL MEDICINE

## 2025-05-25 PROCEDURE — 36415 COLL VENOUS BLD VENIPUNCTURE: CPT | Performed by: STUDENT IN AN ORGANIZED HEALTH CARE EDUCATION/TRAINING PROGRAM

## 2025-05-25 PROCEDURE — 255N000002 HC RX 255 OP 636: Performed by: STUDENT IN AN ORGANIZED HEALTH CARE EDUCATION/TRAINING PROGRAM

## 2025-05-25 PROCEDURE — 999N000285 HC STATISTIC VASC ACCESS LAB DRAW WITH PIV START

## 2025-05-25 PROCEDURE — 92610 EVALUATE SWALLOWING FUNCTION: CPT | Mod: GN

## 2025-05-25 PROCEDURE — 80171 DRUG SCREEN QUANT GABAPENTIN: CPT | Performed by: PSYCHIATRY & NEUROLOGY

## 2025-05-25 PROCEDURE — 99205 OFFICE O/P NEW HI 60 MIN: CPT | Performed by: PSYCHIATRY & NEUROLOGY

## 2025-05-25 PROCEDURE — 82310 ASSAY OF CALCIUM: CPT | Performed by: STUDENT IN AN ORGANIZED HEALTH CARE EDUCATION/TRAINING PROGRAM

## 2025-05-25 PROCEDURE — 250N000013 HC RX MED GY IP 250 OP 250 PS 637: Performed by: STUDENT IN AN ORGANIZED HEALTH CARE EDUCATION/TRAINING PROGRAM

## 2025-05-25 PROCEDURE — 82962 GLUCOSE BLOOD TEST: CPT

## 2025-05-25 PROCEDURE — 93306 TTE W/DOPPLER COMPLETE: CPT | Mod: 26 | Performed by: INTERNAL MEDICINE

## 2025-05-25 PROCEDURE — 93880 EXTRACRANIAL BILAT STUDY: CPT

## 2025-05-25 PROCEDURE — 83735 ASSAY OF MAGNESIUM: CPT | Performed by: STUDENT IN AN ORGANIZED HEALTH CARE EDUCATION/TRAINING PROGRAM

## 2025-05-25 PROCEDURE — 99417 PROLNG OP E/M EACH 15 MIN: CPT | Performed by: PSYCHIATRY & NEUROLOGY

## 2025-05-25 RX ORDER — SENNOSIDES 8.6 MG
2 TABLET ORAL 2 TIMES DAILY
Status: DISCONTINUED | OUTPATIENT
Start: 2025-05-25 | End: 2025-05-28 | Stop reason: HOSPADM

## 2025-05-25 RX ORDER — POLYETHYLENE GLYCOL 3350 17 G/17G
17 POWDER, FOR SOLUTION ORAL 2 TIMES DAILY PRN
Status: DISCONTINUED | OUTPATIENT
Start: 2025-05-25 | End: 2025-05-28 | Stop reason: HOSPADM

## 2025-05-25 RX ORDER — GABAPENTIN 100 MG/1
100 CAPSULE ORAL
Status: DISCONTINUED | OUTPATIENT
Start: 2025-05-25 | End: 2025-05-28 | Stop reason: HOSPADM

## 2025-05-25 RX ORDER — SODIUM CHLORIDE 9 MG/ML
INJECTION, SOLUTION INTRAVENOUS CONTINUOUS
Status: DISCONTINUED | OUTPATIENT
Start: 2025-05-25 | End: 2025-05-26

## 2025-05-25 RX ADMIN — TOPIRAMATE 50 MG: 25 TABLET, FILM COATED ORAL at 22:08

## 2025-05-25 RX ADMIN — BACLOFEN 10 MG: 10 TABLET ORAL at 08:01

## 2025-05-25 RX ADMIN — NEOMYCIN SULFATE, POLYMYXIN B SULFATE AND DEXAMETHASONE 1 DROP: 3.5; 10000; 1 SUSPENSION/ DROPS OPHTHALMIC at 22:05

## 2025-05-25 RX ADMIN — QUETIAPINE FUMARATE 50 MG: 25 TABLET ORAL at 22:07

## 2025-05-25 RX ADMIN — NEOMYCIN SULFATE, POLYMYXIN B SULFATE AND DEXAMETHASONE 1 DROP: 3.5; 10000; 1 SUSPENSION/ DROPS OPHTHALMIC at 11:41

## 2025-05-25 RX ADMIN — GABAPENTIN 100 MG: 100 CAPSULE ORAL at 08:00

## 2025-05-25 RX ADMIN — FERROUS SULFATE TAB 325 MG (65 MG ELEMENTAL FE) 325 MG: 325 (65 FE) TAB at 08:01

## 2025-05-25 RX ADMIN — PERFLUTREN 3 ML: 6.52 INJECTION, SUSPENSION INTRAVENOUS at 15:22

## 2025-05-25 RX ADMIN — GABAPENTIN 100 MG: 100 CAPSULE ORAL at 17:46

## 2025-05-25 RX ADMIN — MIRTAZAPINE 15 MG: 15 TABLET, FILM COATED ORAL at 22:08

## 2025-05-25 RX ADMIN — GABAPENTIN 300 MG: 300 CAPSULE ORAL at 22:07

## 2025-05-25 RX ADMIN — ACETAMINOPHEN 650 MG: 325 TABLET ORAL at 00:18

## 2025-05-25 RX ADMIN — NEOMYCIN SULFATE, POLYMYXIN B SULFATE AND DEXAMETHASONE 1 DROP: 3.5; 10000; 1 SUSPENSION/ DROPS OPHTHALMIC at 04:44

## 2025-05-25 RX ADMIN — NORTRIPTYLINE HYDROCHLORIDE 50 MG: 50 CAPSULE ORAL at 22:08

## 2025-05-25 RX ADMIN — OXCARBAZEPINE 450 MG: 150 TABLET, FILM COATED ORAL at 08:02

## 2025-05-25 RX ADMIN — SODIUM CHLORIDE: 0.9 INJECTION, SOLUTION INTRAVENOUS at 20:41

## 2025-05-25 RX ADMIN — LEVETIRACETAM 750 MG: 500 TABLET, FILM COATED ORAL at 20:43

## 2025-05-25 RX ADMIN — ACETAMINOPHEN 650 MG: 325 TABLET ORAL at 22:13

## 2025-05-25 RX ADMIN — ATORVASTATIN CALCIUM 40 MG: 40 TABLET, FILM COATED ORAL at 20:43

## 2025-05-25 RX ADMIN — LEVETIRACETAM 750 MG: 500 TABLET, FILM COATED ORAL at 08:01

## 2025-05-25 RX ADMIN — NEOMYCIN SULFATE, POLYMYXIN B SULFATE AND DEXAMETHASONE 1 DROP: 3.5; 10000; 1 SUSPENSION/ DROPS OPHTHALMIC at 17:46

## 2025-05-25 ASSESSMENT — ACTIVITIES OF DAILY LIVING (ADL)
ADLS_ACUITY_SCORE: 66
ADLS_ACUITY_SCORE: 71
ADLS_ACUITY_SCORE: 66
ADLS_ACUITY_SCORE: 66
ADLS_ACUITY_SCORE: 64
ADLS_ACUITY_SCORE: 66
ADLS_ACUITY_SCORE: 70
ADLS_ACUITY_SCORE: 66
ADLS_ACUITY_SCORE: 71
ADLS_ACUITY_SCORE: 66
ADLS_ACUITY_SCORE: 64
ADLS_ACUITY_SCORE: 66

## 2025-05-25 NOTE — PROVIDER NOTIFICATION
Pt had episodes of low HR at 40's few times. Pt asymptomatic. Denied any chest pain, no SOB, dizziness or lightheaded. Notified MD about low HR. MD order 12 lead EKG. Notified MD once EKG was done.

## 2025-05-25 NOTE — PLAN OF CARE
"PRIMARY DIAGNOSIS: \"GENERIC\" NURSING  OUTPATIENT/OBSERVATION GOALS TO BE MET BEFORE DISCHARGE:  ADLs back to baseline: No    Activity and level of assistance: in bed     Pain status: Improved-controlled with oral pain medications.    Return to near baseline physical activity: No     Discharge Planner Nurse   Safe discharge environment identified: No  Barriers to discharge: Yes       Entered by: Jackie Huynh RN 05/25/2025 3:08 AM     Please review provider order for any additional goals.   Nurse to notify provider when observation goals have been met and patient is ready for discharge.Goal Outcome Evaluation:    Patient alert, oriented x  3. Forgetful at times but did well with reminders. NIH scale score 3. Complained of headache, administered Tylenol prn, reported some relief. No seizures noted. Restarted hydration once IV was places and tolerating okay. Noted with loose stool x 2. Will continue to monitor the patient.      "

## 2025-05-25 NOTE — PROGRESS NOTES
Report called to short stay nurse.  Patient currently having echo done.  And will go to Ultrasound and then transfer to short stay via cart.

## 2025-05-25 NOTE — TREATMENT PLAN
RCAT Treatment Plan    Patient Score: 8  Patient Acuity: 4    Clinical Indication for Therapy: hypoxia    Therapy Ordered: duoned PRN    Assessment Summary: Patient has no pulmonary history except for a 50 pk-yrs. Patient is here for chest and abdominal pain along with word finding difficultly. Patient is on RA with sats in the mid 90's. Will change duo from QID to prn incase pt has more hypoxic episodes.      Kenneth H. Kjellberg  5/24/2025

## 2025-05-25 NOTE — PROGRESS NOTES
[facility]    Hospitalist Progress Note    Assessment & Plan   80 year old female, history of HLD, migraines, trigeminal neuralgia and seizure disorder, who presents to this ED via walk-in for evaluation of word-finding difficulty.  Patient refusing MRI of the brain, CT head negative for acute process, was subsequently seen to be hypoxic, plan is to admit patient and monitor and give fluids and admitting to observation for acute hypoxia possibly self-induced, also for TIA/stroke workup if patient agrees for further evaluation.     Impression:   Principal Problem:    Word finding difficulty -- suspect related to dehydration and over sedation from Baclofen      Left Trigeminal neuralgia -- was causing more pain, Baclofen 10 mg tid added several days ago, since has been more lethargic and not eating    -- stop baclofen, Neurontin increased to 100 -- 100 -- 300 mg      Dehydration -- push oral fluids (difficult IV, will need Midline if IV access necessary)      Periumbilical abdominal pain      Hypoxia, Transient -- ?from sedation from Baclofen   -- resolved off Baclofen      Acute bacterial conjunctivitis of left eye      Chest pain -- Trop 18, 2 hour repeat 17      Plan:  Discussed with daughter, anticipate home tomorrow if taking PO well.   Patient lives alone but daughter 2 blocks away and stops in several times a day.     DVT Prophylaxis: Low Risk/Ambulatory with no VTE prophylaxis indicated  Code Status: Full Code    Disposition: Expected discharge tomorrow  Medically Ready for Discharge: Anticipated Tomorrow      Other Diagnoses  Clinically Significant Risk Factors Present on Admission         # Hypernatremia: Highest Na = 146 mmol/L in last 2 days, will monitor as appropriate                         # Financial/Environmental Concerns: none           Charan Garces MD  Pager [phone]  Cell Phone [phone]  Text Page (7am to 6pm)    Interval History   Sleepy, doses off when  "talking to her.  Left face pain better today.     Physical Exam   Temp: 97.2  F (36.2  C) Temp src: Axillary BP: 133/63 Pulse: 90   Resp: 24 SpO2: 92 % O2 Device: None (Room air)    Vitals:    05/25/25 1647   Weight: 64.1 kg (141 lb 5 oz)     Vital Signs with Ranges  Temp:  [97.2  F (36.2  C)-99.6  F (37.6  C)] 97.2  F (36.2  C)  Pulse:  [] 90  Resp:  [24-54] 24  BP: (107-150)/(62-75) 133/63  SpO2:  [92 %-100 %] 92 %  I/O last 3 completed shifts:  In: 315 [I.V.:315]  Out: 1050 [Urine:1050]    # Pain Assessment:      5/25/2025     1:18 AM   Current Pain Score   Patient currently in pain? yes   Julisa abbasi pain level was assessed and she currently denies pain.        Constitutional: Awake, drowsy cooperative, no apparent distress  Respiratory: Clear to auscultation bilaterally, no crackles or wheezing  Cardiovascular: Regular rate and rhythm, normal S1 and S2, and no murmur noted  GI: Normal bowel sounds, soft, non-distended, non-tender  Extrem: No calf tenderness, no ankle edema  Neuro: Ox2 (Date \"Nov , ?year, but knew place and president), no focal motor or sensory deficits but generalized weakness.  Speak is normal other than studders when nervous.     Medications   Current Facility-Administered Medications   Medication Dose Route Frequency Provider Last Rate Last Admin     Current Facility-Administered Medications   Medication Dose Route Frequency Provider Last Rate Last Admin    atorvastatin (LIPITOR) tablet 40 mg  40 mg Oral QPM Gondal, Saad J, MD   40 mg at 05/24/25 2158    ferrous sulfate (FEROSUL) tablet 325 mg  325 mg Oral Daily with breakfast Gondal, Saad J, MD   325 mg at 05/25/25 0801    gabapentin (NEURONTIN) capsule 100 mg  100 mg Oral Daily at 4 pm Charan Fregoso MD        gabapentin (NEURONTIN) capsule 100 mg  100 mg Oral Daily Gondal, Saad J, MD   100 mg at 05/25/25 0800    gabapentin (NEURONTIN) capsule 300 mg  300 mg Oral At Bedtime Gondal, Saad J, MD   300 mg at 05/24/25 1425    " levETIRAcetam (KEPPRA) tablet 750 mg  750 mg Oral BID Gondal, Saad J, MD   750 mg at 05/25/25 0801    mirtazapine (REMERON) tablet 15 mg  15 mg Oral At Bedtime Gondal, Saad J, MD   15 mg at 05/24/25 2158    neomycin-polymyxin-dexAMETHasone (MAXITROL) ophthalmic susp 1 drop  1 drop Left Eye Q6H MARCIA Gondal, Saad J, MD   1 drop at 05/25/25 1141    nortriptyline (PAMELOR) capsule 50 mg  50 mg Oral At Bedtime Gondal, Saad J, MD   50 mg at 05/24/25 2158    [START ON 5/26/2025] OXcarbazepine (TRILEPTAL) tablet 450 mg  450 mg Oral At Bedtime Charan Fregoso MD        QUEtiapine (SEROquel) tablet 50 mg  50 mg Oral At Bedtime Gondal, Saad J, MD   50 mg at 05/24/25 2158    sennosides (SENOKOT) tablet 2 tablet  2 tablet Oral BID Charan Fregoso MD        topiramate (TOPAMAX) tablet 50 mg  50 mg Oral At Bedtime Gondal, Saad J, MD   50 mg at 05/24/25 2158       Data   Recent Labs   Lab 05/25/25  1253 05/25/25  0801 05/25/25  0643 05/24/25  1831 05/24/25  1239 05/24/25  1236 05/24/25  1219 05/24/25  1040 05/21/25  1358   WBC  --   --  10.4  --   --   --  7.8  --   --    HGB  --   --  15.6  --   --   --  16.3*  --   --    MCV  --   --  87  --   --   --  88  --   --    PLT  --   --   --   --   --   --  205  --   --    INR  --   --   --   --   --  0.99  --   --   --    NA  --   --  146*  --  145  --   --   --  142   POTASSIUM  --   --  3.6  --  3.8  --   --   --  4.6   CHLORIDE  --   --  104  --  105  --   --   --  104   CO2  --   --  28  --  25  --   --   --  29   BUN  --   --  18.1  --  18.1  --   --   --  25.0*   CR  --   --  0.66  --  0.64  --   --   --  0.82   ANIONGAP  --   --  14  --  15  --   --   --  9   ADY  --   --  8.9  --  9.4  --   --   --  9.9   * 114* 128*   < > 94  --   --    < > 111*   ALBUMIN  --   --   --   --  3.9  --   --   --  3.8   PROTTOTAL  --   --   --   --  7.4  --   --   --  7.0   BILITOTAL  --   --   --   --  0.2  --   --   --  0.2   ALKPHOS  --   --   --   --  156*  --   --   --   138   ALT  --   --   --   --  40  --   --   --  34   AST  --   --   --   --  30  --   --   --  32   LIPASE  --   --   --   --  17  --   --   --   --     < > = values in this interval not displayed.       Imaging:   Recent Results (from the past 24 hours)   Echocardiogram Complete   Result Value    LVEF  >70%    [facility]    [id number]  [id number]  [id number]  [id number]^GONDAL^ERIS^[place] [address]     Name: CHELITA SAWANT  MRN: [id number]  : 1945  Study Date: 2025 02:38 PM  Age: 80 yrs  Gender: Female  Patient Location: [facility]  Reason For Study: Mechanical Complication, stroke  Ordering Physician: GONDAL, SAAD J  Performed By: ANNALISE     BSA: 1.6 m2  Height: 65 in  Weight: 127 lb  HR: 95  BP: 144/65 mmHg  ______________________________________________________________________________  Procedure  Echocardiogram with two-dimensional, color and spectral Doppler. Definity (NDC  #[id number]) given intravenously. The study was technically difficult. Compared  to the prior study dated 22, there are changes as noted.  ______________________________________________________________________________  Interpretation Summary     Hyperdynamic left ventricular function  The visual ejection fraction is >70%.  Normal right ventricle size and systolic function.  An intracavitary gradient is present.  There is moderate (2+) aortic regurgitation.  The study was technically difficult.  Compared to the prior study dated 22, there are changes as noted.  Hyperdynamic LVEF on this study.  ______________________________________________________________________________  Left Ventricle  The left ventricle is normal in size. There is normal left ventricular wall  thickness. Left ventricular diastolic function is normal. An intracavitary  gradient is present. Hyperdynamic left ventricular function. The visual  ejection fraction is >70%. No regional wall motion abnormalities noted.      Right Ventricle  Normal right ventricle size and systolic function.     Atria  Normal left atrial size. Right atrial size is normal. There is no color  Doppler evidence of an atrial shunt.     Mitral Valve  Mitral valve leaflets appear normal.     Tricuspid Valve  The tricuspid valve is not well visualized, but is grossly normal. Right  ventricular systolic pressure is elevated, consistent with mild pulmonary  hypertension. There is mild (1+) tricuspid regurgitation.     Aortic Valve  The aortic valve is trileaflet with aortic valve sclerosis. There is moderate  (2+) aortic regurgitation. No hemodynamically significant valvular aortic  stenosis.     Pulmonic Valve  The pulmonic valve is not well visualized. This degree of valvular  regurgitation is within normal limits.     Vessels  The aorta root is normal. Normal size ascending aorta. IVC diameter <2.1 cm  collapsing >50% with sniff suggests a normal RA pressure of 3 mmHg.     Pericardium  There is no pericardial effusion.     ______________________________________________________________________________  MMode/2D Measurements & Calculations  Ao root diam: 3.4 cm  LVOT diam: 2.0 cm  LVOT area: 3.1 cm2  Ao root diam index Ht(cm/m): 2.1     Ao root diam index BSA (cm/m2): 2.1  EF Biplane: 79.9 %  LA Volume Indexed (AL/bp): 19.6 ml/m2  RV Base: 3.0 cm  TAPSE: 1.5 cm     Time Measurements  Aortic HR: 98.0 BPM  MM HR: 89.0 BPM     Doppler Measurements & Calculations  MV E max mikal: 68.7 cm/sec  MV A max mikal: 123.0 cm/sec  MV E/A: 0.56  MV max P.9 mmHg  MV mean P.0 mmHg  MV V2 VTI: 18.7 cm  MVA(VTI): 4.1 cm2  Ao V2 max: 190.0 cm/sec  Ao max P.0 mmHg  Ao V2 mean: 133.0 cm/sec  Ao mean P.0 mmHg  Ao V2 VTI: 35.9 cm  MARIE(I,D): 2.1 cm2  MARIE(V,D): 2.7 cm2  AI P1/2t: 391.2 msec  LV V1 max PG: 10.6 mmHg  LV V1 max: 163.0 cm/sec  LV V1 VTI: 24.3 cm  CO(LVOT): 7.5 l/min  CI(LVOT): 4.6 l/min/m2  SV(LVOT): 76.3 ml  SI(LVOT): 46.8 ml/m2  TR max mikal: 299.0  cm/sec  TR max P.8 mmHg  AV Dave Ratio (DI): 0.86  MARIE Index (cm2/m2): 1.3  E/E': 10.7  E/E' avg: 10.0  Lateral E/e': 9.3  Medial E/e': 10.7  Peak E' Dave: 6.4 cm/sec  RV S Dave: 17.2 cm/sec     ______________________________________________________________________________  Report approved by: Deya Monique MD on 2025 03:53 PM

## 2025-05-25 NOTE — PROGRESS NOTES
Patient transferred to zoom cart to go to US.  Patient belongings taken to short stay room 1.  Patient will transfer to short stay following US procedure.

## 2025-05-25 NOTE — PROGRESS NOTES
"Speech-Language Pathology: Clinical Swallow Evaluation     05/25/25 0900   Appointment Info   Signing Clinician's Name / Credentials (SLP) Priya Bradshaw MA, CCC-SLP   General Information   Onset of Illness/Injury or Date of Surgery 05/24/25   Referring Physician Gondal, Saad J, MD   Pertinent History of Current Problem Per ordering provider \"Julisa Chaney is an 80 year old female, history of HLD, migraines, trigeminal neuralgia and seizure disorder, who presents to this ED via walk-in for evaluation of word-finding difficulty.  Patient refusing MRI of the brain, CT head negative for acute process, was subsequently seen to be hypoxic, plan is to admit patient and monitor and give fluids and admitting to observation for acute hypoxia possibly self-induced, also for TIA/stroke workup if patient agrees for further evaluation\".   General Observations Alert and cooperative   Type of Evaluation   Type of Evaluation Swallow Evaluation   Oral Motor   Oral Musculature generally intact   Mucosal Quality dry   Dentition (Oral Motor)   Dentition (Oral Motor) edentulous  (Patient reports that she just chooses softer foods that she can chew.  Most items are soups or oatmeal however she will do some soft solids such as pancakes cut into small pieces.)   Facial Symmetry (Oral Motor)   Facial Symmetry (Oral Motor)   (Patient keeping left eye closed)   Lip Function (Oral Motor)   Lip Range of Motion (Oral Motor) WNL   Tongue Function (Oral Motor)   Tongue ROM (Oral Motor) WNL   Cough/Swallow/Gag Reflex (Oral Motor)   Volitional Throat Clear/Cough (Oral Motor) reduced strength   Volitional Swallow (Oral Motor) WNL   Vocal Quality/Secretion Management (Oral Motor)   Vocal Quality (Oral Motor) WFL   Secretion Management (Oral Motor) WNL   General Swallowing Observations   Current Diet/Method of Nutritional Intake (General Swallowing Observations, NIS) thin liquids (level 0);regular diet   Past History of Dysphagia VFSS 8/12/2024 " "results as follows patient reports that she has been\"Videofluoroscopic Swallow Study completed. Patient had trace, trickle down aspiration with thin liquids; risk reduced but not eliminated with chin tuck posture.  Consistent penetration with thin liquids, trace amount but residuals in supraglottic space. Episode of penetration with puree due to stasis/sticking on posterior pharyngeal wall, not reproducible in other trials and likely related to sticky barium texture. Oral phase is WFL. Tongue base retraction was minimally reduced. Swallow response was mildly delayed. Epiglottic inversion was slow but complete.  Mild stasis occurred with puree on first trial, no residuals on other trials. Hyolaryngeal elevation was adequate and hyolaryngeal excursion was adequate.  Mastication slow but complete, increased effort due to limited dentition. Cricopharyngeus has appearance of a thin prominence or tiny web but complete bolus passage. Recommend mildy thick liquids at this time and pursue water protocol with chin tuck as appropriate. Medications crushed in puree. SLP to follow for dysphagia management. Recommend repeat VFSS in 4-6 weeks to assess for thin liquid upgrade.\".  Patient reports that she has been thickening liquids at home and takes medication in purée.   Swallowing Evaluation Clinical swallow evaluation   Clinical Swallow Evaluation   Clinical Swallow Evaluation Textures Trialed solid foods;mildly thick liquids   Clinical Swallow Eval: Mildly Thick Liquids   Mode of Presentation cup   Volume Presented 4 ounces   Oral Phase WFL   Pharyngeal Phase intact   Diagnostic Statement No overt signs or symptoms of aspiration   Clinical Swallow Evaluation: Solid Food Texture Trial   Mode of Presentation self-fed   Volume Presented 1 bite of a cracker, patient declined further   Oral Phase WFL   Pharyngeal Phase intact   Diagnostic Statement No overt signs or symptoms of aspiration.  Patient took a sip of water to soften " cracker.  She reports she would not typically eat something this hard without softening it first   Swallowing Recommendations   Diet Consistency Recommendations mildly thick liquids (level 2);easy to chew (level 7)  (Assisted cut items as needed.  Patient to self chew soft foods she can masticate.)   Monitoring/Assistance Required (Eating/Swallowing) monitor for cough or change in vocal quality with intake;stop eating activities when fatigue is present   Recommended Feeding/Eating Techniques (Swallow Eval) maintain upright sitting position for eating   Medication Administration Recommendations, Swallowing (SLP) 1 at a time whole in purée   Instrumental Assessment Recommendations instrumental evaluation not recommended at this time  (Could consider VFSS pending progression given that she never had recommended repeat after her last VFSS)   General Therapy Interventions   Planned Therapy Interventions Dysphagia Treatment   Dysphagia treatment Instruction of safe swallow strategies;Compensatory strategies for swallowing;Modified diet education   Clinical Impression   Criteria for Skilled Therapeutic Interventions Met (SLP Eval) Yes, treatment indicated   SLP Diagnosis Dysphagia   Risks & Benefits of therapy have been explained evaluation/treatment results reviewed;care plan/treatment goals reviewed;current/potential barriers reviewed;participants voiced agreement with care plan;participants included;patient   Clinical Impression Comments Clinical Swallow Evaluation completed. Patient had no overt s/s aspiration with mildly thick or solid. Oral motor function was generally intact. Mastication was slow but functional.  Patient knows what items she can and cannot chew given lack of dentition. Hyolaryngeal elevation appears present upon visualization and palpation.   SLP Total Evaluation Time   Eval: oral/pharyngeal swallow function, clinical swallow Minutes (16819) 25   SLP Goals   Therapy Frequency (SLP Eval) 5 times/week    SLP Predicted Duration/Target Date for Goal Attainment 06/01/25   SLP Goals Swallow   SLP: Safely tolerate diet without signs/symptoms of aspiration Easy to chew diet;Mildly thick liquids;With use of compensatory swallow strategies;Independently   SLP Discharge Planning   SLP Rationale for DC Rec TBD pending progression   SLP Brief overview of current status  Recommend diet of easy to chew and t mildly thick with strategies of upright, small bites and sips, and patient to self choose softer items. SLP to follow for ongoing dysphagia management and determine if repeat VFSS is warranted during this admission versus outpatient.   Psychosocial Support   Trust Relationship/Rapport care explained

## 2025-05-25 NOTE — PLAN OF CARE
Problem: Adult Inpatient Plan of Care  Goal: Plan of Care Review  Description: The Plan of Care Review/Shift note should be completed every shift.  The Outcome Evaluation is a brief statement about your assessment that the patient is improving, declining, or no change.  This information will be displayed automatically on your shift  note.  Outcome: Progressing  Flowsheets (Taken 5/24/2025 2237)  Plan of Care Reviewed With: patient     Problem: Pain Acute  Goal: Optimal Pain Control and Function  Outcome: Progressing     Problem: Comorbidity Management  Goal: Blood Glucose Levels Within Targeted Range  Outcome: Progressing  Goal: Blood Pressure in Desired Range  Outcome: Progressing   Goal Outcome Evaluation:      Plan of Care Reviewed With: patient      Writer took over this pt at 1735 from ED nurse. Pt alert and oriented times 4 but forgetful at times. Vitals stable. Pt on RA since she moved to new room and has been 93-97%. NIHSS was 4 this shift. Pt refused to do MRI. MD aware of this. Pt had NS running at 75 ml/hr. IV got infiltrated. IV on pause while pt getting new IV line. Abrams intact and draining. Urine sent for urine sample. Still need sputum sample. Tele has been NSR with ST depression. She had episodes of bradycardia  . Notified MD of bradycardia episodes. Pt asymptomatic. 12 lead EKG was done per MD order. MD aware of the result. All cares explained to pt.

## 2025-05-25 NOTE — CONSULTS
NEUROLOGY INPATIENT CONSULTATION NOTE       [facility]  [address] [place] [address]  Tel: [phone]  Fax: [phone]  [email or web address]     Julisa Chaney,  1945, MRN [id number]  PCP: Mara Murillo  Date: 2025     ASSESSMENT & PLAN     Diagnosis code:   TIA  History of seizure    Word finding difficulty  History of seizure disorder  Functional neurological symptoms  80-year-old female with HLD, migraine, trigeminal neuralgia, seizure disorder admitted with word finding difficulty and intermittent hypoxia suspected to be self-induced  CT of the brain unremarkable  Patient refused MRI brain  Lab work includes LDL of 160.  Check Keppra, gabapentin and Topamax level  Echocardiogram  Start baby aspirin daily  Carotid ultrasound    Thank you again for this referral, please feel free to contact me if you have any questions.    Jose Delgado MD  [facility]     CHIEF COMPLAINT Word finding difficulty     HISTORY OF PRESENT ILLNESS     We have been requested by Dr. Fregoso to evaluate Julisa Chaney who is a 80 year old  female for confusion and history of seizure disorder    Patient is a 80-year-old female with history of HLD, trigeminal neuralgia migraine headache, seizure disorder who presented to the emergency room for facial pain along with word finding difficulty.  MRI brain was recommended but patient refused and a CT was done that did not show any acute abnormality.  Initially patient was leaving AGAINST MEDICAL ADVICE but started having episodes of hypoxia and some behavioral issues were suspected and was not cooperating with treatment.  Patient Riolan psych issues were suspected.  No change was made in the dose of Keppra, nortriptyline, Trileptal, Topamax     PROBLEM LIST      Patient Active Problem List   Diagnosis    Osteopenia    Hyperlipidemia    Migraine headache    Trigeminal neuralgia    Counseling regarding  advanced directives and goals of care    Controlled substance agreement signed 5/21/25    Hypercalcemia    Chronic pain syndrome    Iron deficiency anemia due to chronic blood loss    Abnormal chest CT    Mild major depression (H)    Pyloric ulcer, chronic    Pyogenic brain abscess    F11.2 - Continuous opioid dependence (H)    Malnutrition, unspecified type    Current mild episode of major depressive disorder without prior episode    Falls frequently    Hypokalemia    Failure to thrive in adult    Seizure disorder (H)    Adult failure to thrive    Chronic diarrhea    Collagenous colitis    Hypotension due to hypovolemia    Hypomagnesemia    Hypophosphatemia    Hypernatremia    Sepsis (H)    Acute UTI    Bilateral pneumonia    Aspiration pneumonia of right lung, unspecified aspiration pneumonia type, unspecified part of lung (H)    Underweight    Periumbilical abdominal pain    Hypoxia    Word finding difficulty    Acute bacterial conjunctivitis of left eye    Chest pain, unspecified type      Clinically Significant Risk Factors Present on Admission         # Hypernatremia: Highest Na = 146 mmol/L in last 2 days, will monitor as appropriate                           # Financial/Environmental Concerns: none                     PAST MEDICAL & SURGICAL HISTORY     Past Medical History: Patient  has a past medical history of Aspiration pneumonia (H) (02/2022), Depression, High cholesterol, Migraines, Pyloric ulcer, chronic, Sepsis (H) (08/10/2020), and Trigeminal neuralgia.    Past Surgical History: She  has a past surgical history that includes Colonoscopy w/wo Brush **Performed** (N/A, 8/13/2020); Pr Esophagogastroduodenoscopy Transoral Diagnostic (N/A, 8/13/2020); Hysterectomy; Pr Esophagogastroduodenoscopy Transoral Diagnostic (N/A, 8/14/2020); PICC/Midline Placement (7/22/2022); Ventriculostomy (Left, 7/31/2022); IR Gastrostomy Tube Percutaneous Plcmnt (8/16/2022); and Colonoscopy (N/A, 1/19/2024).     SOCIAL  HISTORY     Reviewed, and she  reports that she quit smoking about 10 years ago. Her smoking use included cigarettes. She started smoking about 60 years ago. She has a 50 pack-year smoking history. She has never been exposed to tobacco smoke. She has never used smokeless tobacco. She reports that she does not drink alcohol and does not use drugs.     FAMILY HISTORY     Reviewed, and family history includes Breast Cancer in her maternal aunt, mother, sister, and sister; Cancer in her father; Testicular cancer (age of onset: 17.00) in her grandchild.     ALLERGIES     Allergies   Allergen Reactions    Contrast [Iohexol] Rash     Noticed during 08/2020 admission. Received IV contrast on 8/5    Chocolate Headache    Contrast Dye Rash    Diatrizoate Rash    Penicillins Rash     Tolerated 8 week course of Augmentin in 2015        REVIEW OF SYSTEMS     Pertinent items are noted in HPI.     HOME & HOSPITAL MEDICATIONS     Prior to Admission Medications  (Not in a hospital admission)      Hospital Medications  Current Facility-Administered Medications   Medication Dose Route Frequency Provider Last Rate Last Admin    atorvastatin (LIPITOR) tablet 40 mg  40 mg Oral QPM Gondal, Saad J, MD   40 mg at 05/24/25 2158    baclofen (LIORESAL) tablet 10 mg  10 mg Oral TID Gondal, Saad J, MD   10 mg at 05/25/25 0801    ferrous sulfate (FEROSUL) tablet 325 mg  325 mg Oral Daily with breakfast Gondal, Saad J, MD   325 mg at 05/25/25 0801    gabapentin (NEURONTIN) capsule 100 mg  100 mg Oral Daily Gondal, Saad J, MD   100 mg at 05/25/25 0800    gabapentin (NEURONTIN) capsule 300 mg  300 mg Oral At Bedtime Gondal, Saad J, MD   300 mg at 05/24/25 2157    levETIRAcetam (KEPPRA) tablet 750 mg  750 mg Oral BID Gondal, Saad J, MD   750 mg at 05/25/25 0801    mirtazapine (REMERON) tablet 15 mg  15 mg Oral At Bedtime Gondal, Saad J, MD   15 mg at 05/24/25 2158    neomycin-polymyxin-dexAMETHasone (MAXITROL) ophthalmic susp 1 drop  1 drop Left Eye  Q6H MARCIA Gondal, Saad J, MD   1 drop at 05/25/25 0444    nortriptyline (PAMELOR) capsule 50 mg  50 mg Oral At Bedtime Gondal, Saad J, MD   50 mg at 05/24/25 2158    OXcarbazepine (TRILEPTAL) tablet 450 mg  450 mg Oral Daily Gondal, Saad J, MD   450 mg at 05/25/25 0802    QUEtiapine (SEROquel) tablet 50 mg  50 mg Oral At Bedtime Gondal, Saad J, MD   50 mg at 05/24/25 2158    topiramate (TOPAMAX) tablet 50 mg  50 mg Oral At Bedtime Gondal, Saad J, MD   50 mg at 05/24/25 2158        PHYSICAL EXAM     Vital signs  Temp:  [98  F (36.7  C)-99.6  F (37.6  C)] 99.6  F (37.6  C)  Pulse:  [] 101  Resp:  [21-54] 28  BP: (107-159)/(62-77) 144/65  SpO2:  [89 %-100 %] 95 %    General Physical Exam: Patient is alert and oriented x 3. Vital signs were reviewed and are documented in EMR. Neck was supple, no carotid bruit, thyromegaly, JVD or lymphadenopathy noted.  Neurological Exam:  Patient is alert and oriented when asked questions and starts to stutter but otherwise able to talk normally.  Pupil equal round and reactive face symmetrical tongue midline.  She has decreased mass with normal tone strength 5/5 reflexes trace positive toes downgoing.  Did not cooperate for cerebellar testing     DIAGNOSTIC STUDIES     Pertinent Radiology   Radiology Results: Reviewed impression and images     CT  1.  No CT evidence for acute territorial infarction or intracranial hemorrhage.  2.  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 Stable small area of encephalomalacia and gliosis in the left superior frontal gyrus.  3.  Intracranial atherosclerosis.    Pertinent Labs   Lab Results: Personally Reviewed   Recent Results (from the past 24 hours)   Glucose by meter    Collection Time: 05/24/25 10:40 AM   Result Value Ref Range    GLUCOSE BY METER POCT 95 70 - 99 mg/dL   ECG 12-LEAD WITH MUSE (LHE)    Collection Time: 05/24/25 11:16 AM   Result Value Ref Range    Systolic Blood Pressure 139 mmHg    Diastolic Blood  Pressure 66 mmHg    Ventricular Rate 94 BPM    Atrial Rate 94 BPM    AZ Interval 168 ms    QRS Duration 72 ms     ms    QTc 435 ms    P Axis 67 degrees    R AXIS 39 degrees    T Axis 62 degrees    Interpretation ECG       Sinus rhythm  Nonspecific ST and T wave abnormality  Abnormal ECG  When compared with ECG of 13-Aug-2024 08:15,  No significant change was found  Confirmed by SEE ED PROVIDER NOTE FOR, ECG INTERPRETATION (4000),  JOHN MURRELL ([id number]) on 5/25/2025 3:46:04 AM     INR    Collection Time: 05/24/25 11:37 AM   Result Value Ref Range    INR      PT     CBC with platelets and differential    Collection Time: 05/24/25 12:19 PM   Result Value Ref Range    WBC Count 7.8 4.0 - 11.0 10e3/uL    RBC Count 6.09 (H) 3.80 - 5.20 10e6/uL    Hemoglobin 16.3 (H) 11.7 - 15.7 g/dL    Hematocrit 53.4 (H) 35.0 - 47.0 %    MCV 88 78 - 100 fL    MCH 26.8 26.5 - 33.0 pg    MCHC 30.5 (L) 31.5 - 36.5 g/dL    RDW 13.8 10.0 - 15.0 %    Platelet Count 205 150 - 450 10e3/uL    % Neutrophils 82 %    % Lymphocytes 13 %    % Monocytes 5 %    % Eosinophils 0 %    % Basophils 1 %    % Immature Granulocytes 0 %    NRBCs per 100 WBC 0 <1 /100    Absolute Neutrophils 6.4 1.6 - 8.3 10e3/uL    Absolute Lymphocytes 1.0 0.8 - 5.3 10e3/uL    Absolute Monocytes 0.4 0.0 - 1.3 10e3/uL    Absolute Eosinophils 0.0 0.0 - 0.7 10e3/uL    Absolute Basophils 0.0 0.0 - 0.2 10e3/uL    Absolute Immature Granulocytes 0.0 <=0.4 10e3/uL    Absolute NRBCs 0.0 10e3/uL   PTT    Collection Time: 05/24/25 12:36 PM   Result Value Ref Range    aPTT 28 22 - 38 Seconds   INR    Collection Time: 05/24/25 12:36 PM   Result Value Ref Range    INR 0.99 0.85 - 1.15    PT 13.4 11.8 - 14.8 Seconds   Basic metabolic panel    Collection Time: 05/24/25 12:39 PM   Result Value Ref Range    Sodium 145 135 - 145 mmol/L    Potassium 3.8 3.4 - 5.3 mmol/L    Chloride 105 98 - 107 mmol/L    Carbon Dioxide (CO2) 25 22 - 29 mmol/L    Anion Gap 15 7 - 15 mmol/L    Urea  Nitrogen 18.1 8.0 - 23.0 mg/dL    Creatinine 0.64 0.51 - 0.95 mg/dL    GFR Estimate 89 >60 mL/min/1.73m2    Calcium 9.4 8.8 - 10.4 mg/dL    Glucose 94 70 - 99 mg/dL   Troponin T, High Sensitivity    Collection Time: 05/24/25 12:39 PM   Result Value Ref Range    Troponin T, High Sensitivity 18 (H) <=14 ng/L   Hepatic function panel    Collection Time: 05/24/25 12:39 PM   Result Value Ref Range    Protein Total 7.4 6.4 - 8.3 g/dL    Albumin 3.9 3.5 - 5.2 g/dL    Bilirubin Total 0.2 <=1.2 mg/dL    Alkaline Phosphatase 156 (H) 40 - 150 U/L    AST 30 0 - 45 U/L    ALT 40 0 - 50 U/L    Bilirubin Direct 0.08 0.00 - 0.30 mg/dL   Lipase    Collection Time: 05/24/25 12:39 PM   Result Value Ref Range    Lipase 17 13 - 60 U/L   Troponin T, High Sensitivity    Collection Time: 05/24/25  2:38 PM   Result Value Ref Range    Troponin T, High Sensitivity 17 (H) <=14 ng/L   Lipid panel    Collection Time: 05/24/25  2:38 PM   Result Value Ref Range    Cholesterol 251 (H) <200 mg/dL    Triglycerides 89 <150 mg/dL    Direct Measure HDL 73 >=50 mg/dL    LDL Cholesterol Calculated 160 (H) <100 mg/dL    Non HDL Cholesterol 178 (H) <130 mg/dL   UA with Microscopic reflex to Culture    Collection Time: 05/24/25  4:59 PM    Specimen: Urine, Abrams Catheter   Result Value Ref Range    Color Urine Yellow Colorless, Straw, Light Yellow, Yellow    Appearance Urine Clear Clear    Glucose Urine Negative Negative mg/dL    Bilirubin Urine Negative Negative    Ketones Urine 40 (A) Negative mg/dL    Specific Gravity Urine 1.020 1.001 - 1.030    Blood Urine Negative Negative    pH Urine 5.5 5.0 - 7.0    Protein Albumin Urine Negative Negative mg/dL    Urobilinogen Urine Normal Normal mg/dL    Nitrite Urine Negative Negative    Leukocyte Esterase Urine Negative Negative    Mucus Urine Present (A) None Seen /LPF    RBC Urine 2 <=2 /HPF    WBC Urine <1 <=5 /HPF   Glucose by meter    Collection Time: 05/24/25  6:31 PM   Result Value Ref Range    GLUCOSE BY  METER POCT 110 (H) 70 - 99 mg/dL   Respiratory Panel PCR    Collection Time: 05/24/25  6:36 PM    Specimen: Nasopharyngeal; Swab   Result Value Ref Range    Adenovirus Not Detected Not Detected    Coronavirus Not Detected Not Detected    Human Metapneumovirus Not Detected Not Detected    Human Rhin/Enterovirus Not Detected Not Detected    Influenza A Not Detected Not Detected    Influenza A, H1 Not Detected Not Detected    Influenza A 2009 H1N1 Not Detected Not Detected    Influenza A, H3 Not Detected Not Detected    Influenza B Not Detected Not Detected    Parainfluenza Virus 1 Not Detected Not Detected    Parainfluenza Virus 2 Not Detected Not Detected    Parainfluenza Virus 3 Not Detected Not Detected    Parainfluenza Virus 4 Not Detected Not Detected    Respiratory Syncytial Virus A Not Detected Not Detected    Respiratory Syncytial Virus B Not Detected Not Detected    Chlamydia Pneumoniae Not Detected Not Detected    Mycoplasma Pneumoniae Not Detected Not Detected   Legionella Urinary Antigen and Streptococcus pneumoniae antigen    Collection Time: 05/24/25  6:45 PM    Specimen: Urine, Abrams Catheter   Result Value Ref Range    Legionella pneumophila serogroup 1 urinary antigen Negative Negative    Streptococcus pneumoniae antigen Negative Negative    Legionella pneumophila Urinary/Strep pneumoniae Antigen Specimen Type Urine    ECG 12-LEAD WITH MUSE (LHE)    Collection Time: 05/24/25  7:27 PM   Result Value Ref Range    Systolic Blood Pressure  mmHg    Diastolic Blood Pressure  mmHg    Ventricular Rate 64 BPM    Atrial Rate 64 BPM    PA Interval 192 ms    QRS Duration 74 ms     ms    QTc 455 ms    P Axis 52 degrees    R AXIS 23 degrees    T Axis 49 degrees    Interpretation ECG       Sinus rhythm with marked sinus arrhythmia  Septal infarct , age undetermined  Abnormal ECG  When compared with ECG of 24-May-2025 11:16,  Septal infarct is now Present  Confirmed by SEE ED PROVIDER NOTE FOR, ECG  INTERPRETATION (4000),  JOHN MURRELL ([id number]) on 5/25/2025 3:46:10 AM     Glucose by meter    Collection Time: 05/24/25 11:32 PM   Result Value Ref Range    GLUCOSE BY METER POCT 135 (H) 70 - 99 mg/dL   CBC with platelets    Collection Time: 05/25/25  6:43 AM   Result Value Ref Range    WBC Count 10.4 4.0 - 11.0 10e3/uL    RBC Count 5.85 (H) 3.80 - 5.20 10e6/uL    Hemoglobin 15.6 11.7 - 15.7 g/dL    Hematocrit 50.6 (H) 35.0 - 47.0 %    MCV 87 78 - 100 fL    MCH 26.7 26.5 - 33.0 pg    MCHC 30.8 (L) 31.5 - 36.5 g/dL    RDW 13.9 10.0 - 15.0 %    Platelet Count     Basic metabolic panel    Collection Time: 05/25/25  6:43 AM   Result Value Ref Range    Sodium 146 (H) 135 - 145 mmol/L    Potassium 3.6 3.4 - 5.3 mmol/L    Chloride 104 98 - 107 mmol/L    Carbon Dioxide (CO2) 28 22 - 29 mmol/L    Anion Gap 14 7 - 15 mmol/L    Urea Nitrogen 18.1 8.0 - 23.0 mg/dL    Creatinine 0.66 0.51 - 0.95 mg/dL    GFR Estimate 88 >60 mL/min/1.73m2    Calcium 8.9 8.8 - 10.4 mg/dL    Glucose 128 (H) 70 - 99 mg/dL   Magnesium    Collection Time: 05/25/25  6:43 AM   Result Value Ref Range    Magnesium 1.9 1.7 - 2.3 mg/dL   Phosphorus    Collection Time: 05/25/25  6:43 AM   Result Value Ref Range    Phosphorus 2.9 2.5 - 4.5 mg/dL   RBC and Platelet Morphology    Collection Time: 05/25/25  6:43 AM   Result Value Ref Range    RBC Morphology Confirmed RBC Indices     Platelet Assessment Platelets Clumped (A) Automated Count Confirmed. Platelet morphology is normal.   Glucose by meter    Collection Time: 05/25/25  8:01 AM   Result Value Ref Range    GLUCOSE BY METER POCT 114 (H) 70 - 99 mg/dL       Total time spent for face to face visit, reviewing labs/imaging studies, counseling and coordination of care was: 1 Hour 30 Minutes More than 50% of this time was spent on counseling and coordination of care.    This note was dictated using voice recognition software.  Any grammatical or context distortions are unintentional and inherent to the  software.

## 2025-05-25 NOTE — PLAN OF CARE
"  Problem: Adult Inpatient Plan of Care  Goal: Plan of Care Review  Description: The Plan of Care Review/Shift note should be completed every shift.  The Outcome Evaluation is a brief statement about your assessment that the patient is improving, declining, or no change.  This information will be displayed automatically on your shift  note.  Outcome: Progressing  Goal: Patient-Specific Goal (Individualized)  Description: You can add care plan individualizations to a care plan. Examples of Individualization might be:  \"Parent requests to be called daily at 9am for status\", \"I have a hard time hearing out of my right ear\", or \"Do not touch me to wake me up as it startles  me\".  Outcome: Progressing  Goal: Absence of Hospital-Acquired Illness or Injury  Outcome: Progressing  Intervention: Identify and Manage Fall Risk  Recent Flowsheet Documentation  Taken 5/25/2025 1735 by Nicolle Butler RN  Safety Promotion/Fall Prevention: assistive device/personal items within reach  Intervention: Prevent Skin Injury  Recent Flowsheet Documentation  Taken 5/25/2025 1735 by Nicolle Butler RN  Body Position: position changed independently  Goal: Optimal Comfort and Wellbeing  Outcome: Progressing  Intervention: Provide Person-Centered Care  Recent Flowsheet Documentation  Taken 5/25/2025 1735 by Nicolle Butler, RN  Trust Relationship/Rapport: care explained  Goal: Readiness for Transition of Care  Outcome: Progressing   Goal Outcome Evaluation:       Pt settled into room, transferred from ED.  Pt did have US, has been refusing MRI.  Pt's IV is patent. Pt continues in SR, prefers to sleep.  Pleasant and cooperative, tends to fall asleep easily.  Takes pills in applesauce.  Diet is easy to chew; mildly thick liquids.  Will encourage to eat and drink.                       "

## 2025-05-26 ENCOUNTER — APPOINTMENT (OUTPATIENT)
Dept: OCCUPATIONAL THERAPY | Facility: HOSPITAL | Age: 80
End: 2025-05-26
Attending: STUDENT IN AN ORGANIZED HEALTH CARE EDUCATION/TRAINING PROGRAM
Payer: COMMERCIAL

## 2025-05-26 ENCOUNTER — PATIENT OUTREACH (OUTPATIENT)
Dept: CARE COORDINATION | Facility: CLINIC | Age: 80
End: 2025-05-26
Payer: COMMERCIAL

## 2025-05-26 ENCOUNTER — APPOINTMENT (OUTPATIENT)
Dept: SPEECH THERAPY | Facility: HOSPITAL | Age: 80
End: 2025-05-26
Attending: STUDENT IN AN ORGANIZED HEALTH CARE EDUCATION/TRAINING PROGRAM
Payer: COMMERCIAL

## 2025-05-26 ENCOUNTER — APPOINTMENT (OUTPATIENT)
Dept: PHYSICAL THERAPY | Facility: HOSPITAL | Age: 80
End: 2025-05-26
Attending: STUDENT IN AN ORGANIZED HEALTH CARE EDUCATION/TRAINING PROGRAM
Payer: COMMERCIAL

## 2025-05-26 LAB
ANION GAP SERPL CALCULATED.3IONS-SCNC: 8 MMOL/L (ref 7–15)
BUN SERPL-MCNC: 14.7 MG/DL (ref 8–23)
CALCIUM SERPL-MCNC: 8.1 MG/DL (ref 8.8–10.4)
CHLORIDE SERPL-SCNC: 113 MMOL/L (ref 98–107)
CREAT SERPL-MCNC: 0.62 MG/DL (ref 0.51–0.95)
EGFRCR SERPLBLD CKD-EPI 2021: 90 ML/MIN/1.73M2
ERYTHROCYTE [DISTWIDTH] IN BLOOD BY AUTOMATED COUNT: 14 % (ref 10–15)
GLUCOSE BLDC GLUCOMTR-MCNC: 106 MG/DL (ref 70–99)
GLUCOSE SERPL-MCNC: 90 MG/DL (ref 70–99)
HCO3 SERPL-SCNC: 25 MMOL/L (ref 22–29)
HCT VFR BLD AUTO: 45.4 % (ref 35–47)
HGB BLD-MCNC: 14.2 G/DL (ref 11.7–15.7)
HOLD SPECIMEN: NORMAL
MCH RBC QN AUTO: 27.2 PG (ref 26.5–33)
MCHC RBC AUTO-ENTMCNC: 31.3 G/DL (ref 31.5–36.5)
MCV RBC AUTO: 87 FL (ref 78–100)
PLATELET # BLD AUTO: 217 10E3/UL (ref 150–450)
POTASSIUM SERPL-SCNC: 3.2 MMOL/L (ref 3.4–5.3)
POTASSIUM SERPL-SCNC: 3.5 MMOL/L (ref 3.4–5.3)
RBC # BLD AUTO: 5.22 10E6/UL (ref 3.8–5.2)
SODIUM SERPL-SCNC: 145 MMOL/L (ref 135–145)
SODIUM SERPL-SCNC: 146 MMOL/L (ref 135–145)
WBC # BLD AUTO: 7.7 10E3/UL (ref 4–11)

## 2025-05-26 PROCEDURE — 250N000013 HC RX MED GY IP 250 OP 250 PS 637

## 2025-05-26 PROCEDURE — 36415 COLL VENOUS BLD VENIPUNCTURE: CPT | Performed by: INTERNAL MEDICINE

## 2025-05-26 PROCEDURE — 84132 ASSAY OF SERUM POTASSIUM: CPT

## 2025-05-26 PROCEDURE — 36415 COLL VENOUS BLD VENIPUNCTURE: CPT

## 2025-05-26 PROCEDURE — 97530 THERAPEUTIC ACTIVITIES: CPT | Mod: GP

## 2025-05-26 PROCEDURE — 97166 OT EVAL MOD COMPLEX 45 MIN: CPT | Mod: GO

## 2025-05-26 PROCEDURE — 99215 OFFICE O/P EST HI 40 MIN: CPT | Performed by: PSYCHIATRY & NEUROLOGY

## 2025-05-26 PROCEDURE — 250N000013 HC RX MED GY IP 250 OP 250 PS 637: Performed by: INTERNAL MEDICINE

## 2025-05-26 PROCEDURE — 250N000013 HC RX MED GY IP 250 OP 250 PS 637: Performed by: STUDENT IN AN ORGANIZED HEALTH CARE EDUCATION/TRAINING PROGRAM

## 2025-05-26 PROCEDURE — 96361 HYDRATE IV INFUSION ADD-ON: CPT

## 2025-05-26 PROCEDURE — 82962 GLUCOSE BLOOD TEST: CPT

## 2025-05-26 PROCEDURE — 92526 ORAL FUNCTION THERAPY: CPT | Mod: GN

## 2025-05-26 PROCEDURE — 97161 PT EVAL LOW COMPLEX 20 MIN: CPT | Mod: GP

## 2025-05-26 PROCEDURE — 258N000002 HC RX IP 258 OP 250

## 2025-05-26 PROCEDURE — 84295 ASSAY OF SERUM SODIUM: CPT

## 2025-05-26 PROCEDURE — G0378 HOSPITAL OBSERVATION PER HR: HCPCS

## 2025-05-26 PROCEDURE — 82374 ASSAY BLOOD CARBON DIOXIDE: CPT

## 2025-05-26 PROCEDURE — 97535 SELF CARE MNGMENT TRAINING: CPT | Mod: GO

## 2025-05-26 PROCEDURE — 85048 AUTOMATED LEUKOCYTE COUNT: CPT | Performed by: INTERNAL MEDICINE

## 2025-05-26 PROCEDURE — 258N000003 HC RX IP 258 OP 636

## 2025-05-26 PROCEDURE — 97542 WHEELCHAIR MNGMENT TRAINING: CPT | Mod: GP

## 2025-05-26 RX ORDER — NALOXONE HYDROCHLORIDE 0.4 MG/ML
0.4 INJECTION, SOLUTION INTRAMUSCULAR; INTRAVENOUS; SUBCUTANEOUS
Status: DISCONTINUED | OUTPATIENT
Start: 2025-05-26 | End: 2025-05-28 | Stop reason: HOSPADM

## 2025-05-26 RX ORDER — SODIUM CHLORIDE 450 MG/100ML
INJECTION, SOLUTION INTRAVENOUS CONTINUOUS
Status: DISCONTINUED | OUTPATIENT
Start: 2025-05-26 | End: 2025-05-26

## 2025-05-26 RX ORDER — ASPIRIN 81 MG/1
81 TABLET ORAL DAILY
Status: DISCONTINUED | OUTPATIENT
Start: 2025-05-26 | End: 2025-05-28 | Stop reason: HOSPADM

## 2025-05-26 RX ORDER — POTASSIUM CHLORIDE 1500 MG/1
40 TABLET, EXTENDED RELEASE ORAL ONCE
Status: COMPLETED | OUTPATIENT
Start: 2025-05-26 | End: 2025-05-26

## 2025-05-26 RX ORDER — SODIUM CHLORIDE 9 MG/ML
INJECTION, SOLUTION INTRAVENOUS CONTINUOUS
Status: ACTIVE | OUTPATIENT
Start: 2025-05-26 | End: 2025-05-27

## 2025-05-26 RX ORDER — NALOXONE HYDROCHLORIDE 0.4 MG/ML
0.2 INJECTION, SOLUTION INTRAMUSCULAR; INTRAVENOUS; SUBCUTANEOUS
Status: DISCONTINUED | OUTPATIENT
Start: 2025-05-26 | End: 2025-05-28 | Stop reason: HOSPADM

## 2025-05-26 RX ORDER — ACETAMINOPHEN AND CODEINE PHOSPHATE 300; 30 MG/1; MG/1
1 TABLET ORAL EVERY 6 HOURS PRN
Status: DISCONTINUED | OUTPATIENT
Start: 2025-05-26 | End: 2025-05-28 | Stop reason: HOSPADM

## 2025-05-26 RX ADMIN — GABAPENTIN 100 MG: 100 CAPSULE ORAL at 15:57

## 2025-05-26 RX ADMIN — NEOMYCIN SULFATE, POLYMYXIN B SULFATE AND DEXAMETHASONE 1 DROP: 3.5; 10000; 1 SUSPENSION/ DROPS OPHTHALMIC at 21:12

## 2025-05-26 RX ADMIN — ATORVASTATIN CALCIUM 40 MG: 40 TABLET, FILM COATED ORAL at 21:04

## 2025-05-26 RX ADMIN — GABAPENTIN 300 MG: 300 CAPSULE ORAL at 21:03

## 2025-05-26 RX ADMIN — SODIUM CHLORIDE: 4.5 INJECTION, SOLUTION INTRAVENOUS at 09:27

## 2025-05-26 RX ADMIN — TOPIRAMATE 50 MG: 25 TABLET, FILM COATED ORAL at 21:09

## 2025-05-26 RX ADMIN — ACETAMINOPHEN AND CODEINE PHOSPHATE 1 TABLET: 300; 30 TABLET ORAL at 23:36

## 2025-05-26 RX ADMIN — ASPIRIN 81 MG: 81 TABLET, COATED ORAL at 08:49

## 2025-05-26 RX ADMIN — GABAPENTIN 100 MG: 100 CAPSULE ORAL at 08:49

## 2025-05-26 RX ADMIN — FERROUS SULFATE TAB 325 MG (65 MG ELEMENTAL FE) 325 MG: 325 (65 FE) TAB at 08:49

## 2025-05-26 RX ADMIN — ACETAMINOPHEN AND CODEINE PHOSPHATE 1 TABLET: 300; 30 TABLET ORAL at 16:04

## 2025-05-26 RX ADMIN — NORTRIPTYLINE HYDROCHLORIDE 50 MG: 50 CAPSULE ORAL at 21:07

## 2025-05-26 RX ADMIN — SODIUM CHLORIDE 1000 ML: 0.9 INJECTION, SOLUTION INTRAVENOUS at 14:10

## 2025-05-26 RX ADMIN — ACETAMINOPHEN AND CODEINE PHOSPHATE 1 TABLET: 300; 30 TABLET ORAL at 10:29

## 2025-05-26 RX ADMIN — QUETIAPINE FUMARATE 50 MG: 25 TABLET ORAL at 21:06

## 2025-05-26 RX ADMIN — LEVETIRACETAM 750 MG: 500 TABLET, FILM COATED ORAL at 21:05

## 2025-05-26 RX ADMIN — NEOMYCIN SULFATE, POLYMYXIN B SULFATE AND DEXAMETHASONE 1 DROP: 3.5; 10000; 1 SUSPENSION/ DROPS OPHTHALMIC at 10:32

## 2025-05-26 RX ADMIN — POTASSIUM CHLORIDE 40 MEQ: 1500 TABLET, EXTENDED RELEASE ORAL at 09:23

## 2025-05-26 RX ADMIN — NEOMYCIN SULFATE, POLYMYXIN B SULFATE AND DEXAMETHASONE 1 DROP: 3.5; 10000; 1 SUSPENSION/ DROPS OPHTHALMIC at 05:13

## 2025-05-26 RX ADMIN — OXCARBAZEPINE 450 MG: 150 TABLET, FILM COATED ORAL at 21:10

## 2025-05-26 RX ADMIN — NEOMYCIN SULFATE, POLYMYXIN B SULFATE AND DEXAMETHASONE 1 DROP: 3.5; 10000; 1 SUSPENSION/ DROPS OPHTHALMIC at 15:57

## 2025-05-26 RX ADMIN — LEVETIRACETAM 750 MG: 500 TABLET, FILM COATED ORAL at 08:49

## 2025-05-26 RX ADMIN — SODIUM CHLORIDE: 0.9 INJECTION, SOLUTION INTRAVENOUS at 17:56

## 2025-05-26 RX ADMIN — MIRTAZAPINE 15 MG: 15 TABLET, FILM COATED ORAL at 21:08

## 2025-05-26 ASSESSMENT — ACTIVITIES OF DAILY LIVING (ADL)
ADLS_ACUITY_SCORE: 72
ADLS_ACUITY_SCORE: 75
ADLS_ACUITY_SCORE: 75
ADLS_ACUITY_SCORE: 72
ADLS_ACUITY_SCORE: 75
ADLS_ACUITY_SCORE: 72
ADLS_ACUITY_SCORE: 75
ADLS_ACUITY_SCORE: 75
ADLS_ACUITY_SCORE: 69
ADLS_ACUITY_SCORE: 75
ADLS_ACUITY_SCORE: 72
ADLS_ACUITY_SCORE: 75
ADLS_ACUITY_SCORE: 70
ADLS_ACUITY_SCORE: 72
ADLS_ACUITY_SCORE: 75
ADLS_ACUITY_SCORE: 72
ADLS_ACUITY_SCORE: 69
ADLS_ACUITY_SCORE: 72
ADLS_ACUITY_SCORE: 69
ADLS_ACUITY_SCORE: 72
ADLS_ACUITY_SCORE: 75

## 2025-05-26 NOTE — PROGRESS NOTES
"PRIMARY DIAGNOSIS: \"GENERIC\" NURSING  OUTPATIENT/OBSERVATION GOALS TO BE MET BEFORE DISCHARGE:  ADLs back to baseline: Yes    Activity and level of assistance: up with 1 assist to wheelchair.    Pain status: Usual pain medications she takes at home were reordered.    Return to near baseline physical activity: Yes     Discharge Planner Nurse   Safe discharge environment identified: Unknown  Barriers to discharge: Pending void trial and intake.       Entered by: Sarina Smith RN 05/26/2025 11:11 AM     Please review provider order for any additional goals.   Nurse to notify provider when observation goals have been met and patient is ready for discharge.    Abrams catheter discontinued this morning around 9:00am. PT/OT involved. Patient got up to wheelchair but refused to stay sitting up and wanted to go back to bed. Monitor Intake and output encourage fluids and food intake.  "

## 2025-05-26 NOTE — PLAN OF CARE
Problem: Adult Inpatient Plan of Care  Goal: Absence of Hospital-Acquired Illness or Injury  Intervention: Identify and Manage Fall Risk  Recent Flowsheet Documentation  Taken 5/26/2025 0400 by Nilda Virk RN  Safety Promotion/Fall Prevention:   clutter free environment maintained   increase visualization of patient   lighting adjusted   safety round/check completed  Taken 5/25/2025 2347 by Nilda Virk RN  Safety Promotion/Fall Prevention:   clutter free environment maintained   increase visualization of patient   lighting adjusted   safety round/check completed  Taken 5/25/2025 2100 by Nilda Virk RN  Safety Promotion/Fall Prevention:   clutter free environment maintained   increase visualization of patient   lighting adjusted   safety round/check completed     Problem: Comorbidity Management  Goal: Maintenance of Seizure Control  Intervention: Maintain Seizure Symptom Control  Recent Flowsheet Documentation  Taken 5/26/2025 0400 by Nilda Vikr RN  Sensory Stimulation Regulation:   care clustered   lighting decreased   music on  Seizure Precautions: side rails padded  Medication Review/Management: medications reviewed  Taken 5/25/2025 2347 by Nilda Virk RN  Sensory Stimulation Regulation:   care clustered   lighting decreased   music on  Seizure Precautions: side rails padded  Medication Review/Management: medications reviewed  Taken 5/25/2025 2100 by Nilda Virk RN  Sensory Stimulation Regulation:   care clustered   lighting decreased   music on  Seizure Precautions: side rails padded  Medication Review/Management: medications reviewed   Goal Outcome Evaluation:      Plan of Care Reviewed With: patient    Overall Patient Progress: improvingOverall Patient Progress: improving      Alert xO3. Forgetful at times. VSS in RA. No seizure noted. Seizure pads in place.  Denies chest pain. Tele -NSR. Had 3x loose BM. Incontinent B&B. Abrams in place, intact.  IV  fluids NS running 75ml/hr at R upper forearm. Ax2 on cares. Takes pills whole with apple sauce. On Easy to chew ; mildly thick L2 diet. Waiting for Urine drug panel pending results. Pending sputum sample.    Nilda Virk RN

## 2025-05-26 NOTE — PLAN OF CARE
Speech Language Therapy Discharge Summary    Reason for therapy discharge:    All goals and outcomes met, no further needs identified.  No further expectations of functional progress.    Progress towards therapy goal(s). See goals on Care Plan in [facility] electronic health record for goal details.  Goals met    Therapy recommendation(s):    No further therapy is recommended. Recommend diet of easy to chew and mildly thick with strategies of upright, small bites and sips, and patient to self choose softer items. Recommend repeat VFSS as an OP to determine ongoing need for thickened liquids, when closer to medical and physical baseline.

## 2025-05-26 NOTE — PROGRESS NOTES
NEUROLOGY INPATIENT PROGRESS NOTE       [facility] NEUROLOGY [place]  [address] [place] [address]  Tel: [phone]  Fax: [phone]  [email or web address]     ASSESSMENT & PLAN   Date: 05/26/2025  Hospital Day#: 0  Visit diagnosis:    TIA  History of seizure    Word finding difficulty  History of seizure disorder  Functional neurological symptoms  80-year-old female with HLD, migraine, trigeminal neuralgia, seizure disorder admitted with word finding difficulty and intermittent hypoxia suspected to be self-induced  CT of the brain unremarkable  Patient refused MRI brain  Lab work includes LDL of 160  Keppra level is therapeutic.  Topamax, gabapentin and Trileptal level is pending  Carotid ultrasound shows less than 50% bilateral ICA stenosis with antegrade vertebral artery flow  Evaluated by speech  Nothing more to add from neurology standpoint, will sign off.  Please call if any questions    Neurology Discharge Planning :   RUDY Delgado MD  [facility]     PROBLEM LIST      Patient Active Problem List   Diagnosis    Osteopenia    Hyperlipidemia    Migraine headache    Trigeminal neuralgia    Counseling regarding advanced directives and goals of care    Controlled substance agreement signed 5/21/25    Hypercalcemia    Chronic pain syndrome    Iron deficiency anemia due to chronic blood loss    Abnormal chest CT    Mild major depression (H)    Pyloric ulcer, chronic    Pyogenic brain abscess    F11.2 - Continuous opioid dependence (H)    Malnutrition, unspecified type    Current mild episode of major depressive disorder without prior episode    Falls frequently    Dehydration    Hypokalemia    Failure to thrive in adult    Seizure disorder (H)    Adult failure to thrive    Chronic diarrhea    Collagenous colitis    Hypotension due to hypovolemia    Hypomagnesemia    Hypophosphatemia    Hypernatremia    Sepsis (H)    Acute UTI    Bilateral pneumonia     Aspiration pneumonia of right lung, unspecified aspiration pneumonia type, unspecified part of lung (H)    Underweight    Periumbilical abdominal pain    Hypoxia    Word finding difficulty    Acute bacterial conjunctivitis of left eye    Chest pain, unspecified type       Past Medical History:   Patient  has a past medical history of Aspiration pneumonia (H) (02/2022), Depression, High cholesterol, Migraines, Pyloric ulcer, chronic, Sepsis (H) (08/10/2020), and Trigeminal neuralgia.     SUBJECTIVE     Patient alert and oriented at times forgetful no further seizures reported speech seems to be improving     OBJECTIVE     Vital signs in last 24 hours  Temp:  [97.2  F (36.2  C)-98  F (36.7  C)] 97.3  F (36.3  C)  Pulse:  [72-97] 72  Resp:  [20-26] 20  BP: (133-161)/(63-70) 147/67  SpO2:  [92 %-97 %] 97 %    Review of Systems   Pertinent items are noted in HPI.    General Physical Exam: Patient is alert and oriented x 3. Vital signs were reviewed and are documented in EMR. Neck was supple, no carotid bruit, thyromegaly, JVD or lymphadenopathy noted.  Neurological Exam:  Patient is alert and oriented when asked questions and starts to stutter but otherwise able to talk normally.  Pupil equal round and reactive face symmetrical tongue midline.  She has decreased mass with normal tone strength 5/5 reflexes trace positive toes downgoing.  Did not cooperate for cerebellar testing     DIAGNOSTIC STUDIES     Pertinent Radiology   Following imaging studies were reviewed:    CT  1.  No CT evidence for acute territorial infarction or intracranial hemorrhage.  2.  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 Stable small area of encephalomalacia and gliosis in the left superior frontal gyrus.  3.  Intracranial atherosclerosis.    Echocardiogram  Echo result w/o MOPS: Interpretation Summary Hyperdynamic left ventricular functionThe visual ejection fraction is >70%.Normal right ventricle size and systolic  function.An intracavitary gradient is present.There is moderate (2+) aortic regurgitation.The study was technically difficult.Compared to the prior study dated 7/21/22, there are changes as noted.Hyperdynamic LVEF on this study.    Carotid Ultrasound  1.  Mild plaque formation, velocities consistent with less than 50% stenosis in the right internal carotid artery. Elevated velocities in the proximal common carotid artery are favored to be due to vessel tortuosity.  2.  Mild plaque formation, velocities consistent with less than 50% stenosis in the left internal carotid artery.  3.  Flow within the vertebral arteries is antegrade.    Pertinent Labs   Lab Results: Personally Reviewed  Recent Results (from the past 24 hours)   Glucose by meter    Collection Time: 05/25/25  8:01 AM   Result Value Ref Range    GLUCOSE BY METER POCT 114 (H) 70 - 99 mg/dL   Extra Blue Top Tube    Collection Time: 05/25/25  8:54 AM   Result Value Ref Range    Hold Specimen JIC    Extra Green Top (Lithium Heparin) Tube    Collection Time: 05/25/25  8:54 AM   Result Value Ref Range    Hold Specimen JIC    Extra Purple Top Tube    Collection Time: 05/25/25  8:54 AM   Result Value Ref Range    Hold Specimen JIC    Keppra (Levetiracetam) Level    Collection Time: 05/25/25 12:35 PM   Result Value Ref Range    Keppra (Levetiracetam) Level 21.3 10.0 - 40.0  g/mL   Glucose by meter    Collection Time: 05/25/25 12:53 PM   Result Value Ref Range    GLUCOSE BY METER POCT 125 (H) 70 - 99 mg/dL   Echocardiogram Complete    Collection Time: 05/25/25  3:21 PM   Result Value Ref Range    LVEF  >70%    Glucose by meter    Collection Time: 05/25/25 10:03 PM   Result Value Ref Range    GLUCOSE BY METER POCT 125 (H) 70 - 99 mg/dL   CBC with platelets    Collection Time: 05/26/25  6:12 AM   Result Value Ref Range    WBC Count 7.7 4.0 - 11.0 10e3/uL    RBC Count 5.22 (H) 3.80 - 5.20 10e6/uL    Hemoglobin 14.2 11.7 - 15.7 g/dL    Hematocrit 45.4 35.0 - 47.0 %     MCV 87 78 - 100 fL    MCH 27.2 26.5 - 33.0 pg    MCHC 31.3 (L) 31.5 - 36.5 g/dL    RDW 14.0 10.0 - 15.0 %    Platelet Count 217 150 - 450 10e3/uL   Extra Green Top (Lithium Heparin) Tube    Collection Time: 05/26/25  6:18 AM   Result Value Ref Range    Hold Specimen JI    Basic metabolic panel    Collection Time: 05/26/25  6:18 AM   Result Value Ref Range    Sodium 146 (H) 135 - 145 mmol/L    Potassium 3.2 (L) 3.4 - 5.3 mmol/L    Chloride 113 (H) 98 - 107 mmol/L    Carbon Dioxide (CO2) 25 22 - 29 mmol/L    Anion Gap 8 7 - 15 mmol/L    Urea Nitrogen 14.7 8.0 - 23.0 mg/dL    Creatinine 0.62 0.51 - 0.95 mg/dL    GFR Estimate 90 >60 mL/min/1.73m2    Calcium 8.1 (L) 8.8 - 10.4 mg/dL    Glucose 90 70 - 99 mg/dL         HOSPITAL MEDICATIONS     Current Facility-Administered Medications   Medication Dose Route Frequency Provider Last Rate Last Admin    atorvastatin (LIPITOR) tablet 40 mg  40 mg Oral QPM Gondal, Saad J, MD   40 mg at 05/25/25 2043    ferrous sulfate (FEROSUL) tablet 325 mg  325 mg Oral Daily with breakfast Gondal, Saad J, MD   325 mg at 05/25/25 0801    gabapentin (NEURONTIN) capsule 100 mg  100 mg Oral Daily at 4 pm [facility]   100 mg at 05/25/25 1746    gabapentin (NEURONTIN) capsule 100 mg  100 mg Oral Daily Gondal, Saad J, MD   100 mg at 05/25/25 0800    gabapentin (NEURONTIN) capsule 300 mg  300 mg Oral At Bedtime Gondal, Saad J, MD   300 mg at 05/25/25 2207    levETIRAcetam (KEPPRA) tablet 750 mg  750 mg Oral BID Gondal, Saad J, MD   750 mg at 05/25/25 2043    mirtazapine (REMERON) tablet 15 mg  15 mg Oral At Bedtime Gondal, Saad J, MD   15 mg at 05/25/25 2208    neomycin-polymyxin-dexAMETHasone (MAXITROL) ophthalmic susp 1 drop  1 drop Left Eye Q6H [facility] Gondal, Saad J, MD   1 drop at 05/26/25 0513    nortriptyline (PAMELOR) capsule 50 mg  50 mg Oral At Bedtime Gondal, Saad J, MD   50 mg at 05/25/25 2208    OXcarbazepine (TRILEPTAL) tablet 450 mg  450 mg Oral At Bedtime McRaith,  Charan House MD        QUEtiapine (SEROquel) tablet 50 mg  50 mg Oral At Bedtime Gondal, Saad J, MD   50 mg at 05/25/25 2207    sennosides (SENOKOT) tablet 2 tablet  2 tablet Oral BID [facility]        topiramate (TOPAMAX) tablet 50 mg  50 mg Oral At Bedtime Gondal, Saad J, MD   50 mg at 05/25/25 2208        Total time spent for face to face visit, reviewing labs/imaging studies, counseling and coordination of care was: 45 Minutes More than 50% of this time was spent on counseling and coordination of care.      This note was dictated using voice recognition software.  Any grammatical or context distortions are unintentional and inherent to the software.

## 2025-05-26 NOTE — PROGRESS NOTES
"PRIMARY DIAGNOSIS: \"GENERIC\" NURSING  OUTPATIENT/OBSERVATION GOALS TO BE MET BEFORE DISCHARGE:  ADLs back to baseline: Yes    Activity and level of assistance: up with 1 assist to wheelchair.    Pain status: Usual pain medications she takes at home were reordered.    Return to near baseline physical activity: Yes     Discharge Planner Nurse   Safe discharge environment identified: Unknown  Barriers to discharge: Pending void trial and intake.       Entered by: Sarina Smith RN 05/26/2025 2:40 PM     Please review provider order for any additional goals.   Nurse to notify provider when observation goals have been met and patient is ready for discharge.    Abrams catheter discontinued this morning around 9:00am. PT/OT involved. Patient got up to chair for lunch. Only ate about 50% poor fluid intake. NS bolus ordered over 4 hours and then maintenance fluid. Monitor Intake and output. Patient unable to void. BS for 64 mls. Will attempt to void again around 15:30 or sooner if urge. Yarely CORRIGAN aware.  "

## 2025-05-26 NOTE — PROGRESS NOTES
[facility]    Medicine Progress Note - Hospitalist Service    Date of Admission:  5/24/2025    Assessment & Plan   Julisa Chaney is an 80 year old female, history of HLD, migraines, trigeminal neuralgia and seizure disorder, who presented to the ED for evaluation of word-finding difficulty. Patient refusing MRI of the brain, CT head negative for acute process, had neurologic work up and consultation without significant findings. Possible TIA or medication induced encephalopathy / polypharmacy. When I spoke with the daughter, she states they brought the patient to the ER due to concerns about poor PO intake in the setting of intractable trigeminal neuralgia pain, and daughter states the word finding difficulty has been consistent with her being dehydrated in the past.     She had Abrams catheter placed in the ED for urinary retention.  This was removed on 5/26, has not yet voided so we will continue to monitor this overnight.  Is continuing with minimal p.o. intake, continue IVF overnight and monitor closely I's and O's    Transient Aphasia / Word Finding Difficulty  ? Transient Encephalopathy vs Polypharmacy   Seizure Disorder    Per admission notes, she was brought the ED for word finding difficulty.   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, intracranial atherosclerosis)  -- Carotid US with mild plaque formation but < 50% stenosis bilateral L and R ICA  -- Echocardiogram hyperdynamic LV with EF > 70% ; moderate aortic regurg, no WMA  -- Keppra level was therapeutic ; continue PTA Keppra   -- Neurology consult ; possible TIA and recommending starting ASA 81 mg daily    -- Other encephalopathy eval: UA unremarkable, lytes okay  -- Of note, she was recently started on baclofen for her trigeminal neuralgia, so I wonder if there was some medication intoxication  contributing (she also takes gabapentin, quetiapine, topiramate, oxzcarbazepine, mirtazapine, Tylenol #3) - but daughter states patient only took 1 dose of Baclofen prior to ED visit.   -- Daughter states patient has had episodes like this in the past, which have correlated to her being dehydrated   -- Plan will be to discontinue Baclofen and start ASA per neuro recs for possible TIA  -- PT/OT ; home with assist     Transient Hypoxia (resolved)  Emphysema   History of Recurrent Aspiration PNA   Patient had some transient hypoxia in the ED, ED provider was suspecting some behavioral issues leading to acute hypoxia such as breath-holding.   Upon admission, she was no longer hypoxic and has been saturating well on RA   -- CT chest showing mild tree-in-bud opacities and bronchiectasis in the right lung in areas of infiltrates on prior CT, compatible with mild post-infectious fibrosis with or without an element of chronic / indolent infection. Similar mild tree-in-bud opacity also seen in the left lower lobe which may represent similar chronic / indolent process. Stable moderate emphysema. No cough, fever or other URI symptoms to suggest active infection. Will not start on any antibiotics as low suspicion for any infectious etiology   -- Full viral panel, urine Legionella Streptococcus pneumonia antigen negative   -- Appreciate SLP consult ; Patient had no overt s/s aspiration with mildly thick or solid. Oral motor function was generally intact. Mastication was slow but functional. Patient knows what items she can and cannot chew given lack of dentition     ? Urinary Retention  Had Abrams catheter placed in the ED ; does not appear she has a real chart history of this outside of Abrams in ED from 2022  -- UA without infectious indicators   -- Abrams removed 5/26 , TOV this afternoon - has not yet voided     Left Trigeminal Neuralgia   Chronic Pain Syndrome   Was causing more pain, and per daughter, was the reason for  presentation to the ED because symptoms were intractable and she was unable to eat/drink.   -- Baclofen 10 mg TID added several days ago, daughter states had only taken 1 dose   -- Discontinue baclofen given concern for polypharmacy  -- Neurontin increased to 100 -- 100 -- 300 mg   -- Continue nortriptyline, oxcarbazepine   -- Continue Tylenol #3     Dehydration / Poor PO intake with hemoconcentration (polycythemia and hypernatremia)  Physical Deconditioning   Failure to Thrive / Cachexia   Poor PO intake in the setting of trigeminal neuralgia as above   -- Will give 1 L bolus this afternoon and place on normal NS to run through this evening   -- Encourage supplements ordered   -- PT/OT ; recommending home with assist     Hypernatremia, mild   Suspect due to poor PO intake   -- on IVF as above   -- recheck this evening     Hypokalemia - replacement protocols     Mood Disorder   -- Continue PTA Neurontin, Keppra, mirtazapine, nortriptyline, Trileptal, quetiapine, Topamax    Acute Left Eye Conjunctivitis  Has appeared to be placed on neomycin polymyxin dexamethasone ophthalmic solution - Rx sent in discharge navigator by ED provider     Hyperlipidemia - Total cholesterol of 251, LDL of 160  -- Started on Lipitor       Observation Goals: List all goals to be met before discharge home    , Free from ACUTE neuro deficits, Testing complete, Other: , Nurse to notify provider when observation goals have been met and patient is ready for discharge.  Diet: Combination Diet Easy to Chew (level 7); Mildly Thick (level 2)    DVT Prophylaxis: Pneumatic Compression Devices  Abrams Catheter: PRESENT, indication:    Lines: None     Cardiac Monitoring: ACTIVE order. Indication: Stroke, acute (48 hours)  Code Status: Full Code      Clinically Significant Risk Factors Present on Admission         # Hypernatremia: Highest Na = 146 mmol/L in last 2 days, will monitor as appropriate                         # Financial/Environmental  "Concerns: none         Social Drivers of Health    Tobacco Use: Medium Risk (5/21/2025)    Patient History     Smoking Tobacco Use: Former     Smokeless Tobacco Use: Never     Passive Exposure: Never   Physical Activity: Unknown (5/21/2025)    Exercise Vital Sign     Days of Exercise per Week: 0 days   Social Connections: Unknown (5/21/2025)    Social Connection and Isolation Panel [NHANES]     Frequency of Social Gatherings with Friends and Family: More than three times a week          Disposition Plan     Medically Ready for Discharge: Anticipated Tomorrow        The patient's care was discussed with the Attending Physician, Dr. Sekou Blackwell independently met with and assessed the patient and is in agreement with the assessment and plan     Yarely Daniel PA-C  Hospitalist [facility]  Securely message with Euphoria App (more info)  Text page via LogFire Paging/Directory   ______________________________________________________________________    Interval History   Patient is new to me today ; chart reviewed.  She states she is feeling well today.  When I asked her to explain what brought her into the ER, she tells me that her family came to visit her and were concerned because she was having difficulty speaking.  She states she remembers coming into the ER but does not remember anything about her ER course.  She does not remember having any chest or abdominal pain at the time.  She is answering questions well with me here, no obvious difficulty with word finding or focal deficits.  She tells me she has had a similar episode in \"August\" and I see a hospitalization for sepsis secondary to aspiration pneumonia at that time.  She tells me she lives alone but her daughter comes to visit her every day and it is her daughter who helps her with medications.  Endorses some increased weakness and some chronic poor oral intake.  Today overall she states she is feeling well, complaining of some of her " "chronic facial pain from her trigeminal neuralgia but otherwise pain nowhere else.  No chest or abdominal pain.  No shortness of breath or feeling like she is having difficulty breathing.  She is eating some applesauce at bedside without issue.     I spoke with the daughter Alissa over the phone.  She tells me that they brought the patient to the hospital because of her acute on chronic trigeminal neuralgia which has been causing her to have minimal to no p.o. intake over the past 2 days.  When I prompted daughter about word finding difficulty, she tells me that there was some component of this but she states this is correlated to her being \"dehydrated\" in the past.  Discussed that there is concern that Baclofen may have been contributing, daughter states that this might be possible but does note that she only took 1 dose of this since it was prescribed.  Overall her daughter's biggest concern is her ability to tolerate oral intake.  We reviewed PT/OT recommendations, she feels comfortable taking her home and is declining any further home care services, states she is already receiving this at home.    Physical Exam   Vital Signs: Temp: 98  F (36.7  C) Temp src: Axillary BP: (!) 161/70 Pulse: 85   Resp: 26 SpO2: 97 % O2 Device: None (Room air)    Weight: 141 lbs 5.04 oz    Constitutional: awake, alert, cachetic, frail   Respiratory: No increased work of breathing, no accessory muscle use, clear to auscultation bilaterally, no crackles or wheezing  Cardiovascular: Regular rate and rhythm, normal S1 and S2, mild murmur  GI: Soft, non-distended, non-tender, normal bowel sounds, no masses palpated, no hepatosplenomegaly  Skin: Normal skin color, texture, turgor. No rashes and no jaundice  Musculoskeletal: no lower extremity pitting edema present.   Neurologic: Awake, alert. Slightly confused but answers questions appropriately     Medical Decision Making             Data     I have personally reviewed the following data " over the past 24 hrs:    7.7  \   14.2   / 217     146 (H) 113 (H) 14.7 /  106 (H)   3.2 (L) 25 0.62 \       Imaging results reviewed over the past 24 hrs:   Recent Results (from the past 24 hours)   Echocardiogram Complete   Result Value    LVEF  >70%    [facility]    [id number]  [id number]  [id number]  [id number]^GONDAL^ERIS^[place] [address]     Name: CHELITA SAWANT  MRN: [id number]  : 1945  Study Date: 2025 02:38 PM  Age: 80 yrs  Gender: Female  Patient Location: [facility]  Reason For Study: Mechanical Complication, stroke  Ordering Physician: GONDAL, SAAD J  Performed By: ANNALISE     BSA: 1.6 m2  Height: 65 in  Weight: 127 lb  HR: 95  BP: 144/65 mmHg  ______________________________________________________________________________  Procedure  Echocardiogram with two-dimensional, color and spectral Doppler. Definity (NDC  #03060-091) given intravenously. The study was technically difficult. Compared  to the prior study dated 22, there are changes as noted.  ______________________________________________________________________________  Interpretation Summary     Hyperdynamic left ventricular function  The visual ejection fraction is >70%.  Normal right ventricle size and systolic function.  An intracavitary gradient is present.  There is moderate (2+) aortic regurgitation.  The study was technically difficult.  Compared to the prior study dated 22, there are changes as noted.  Hyperdynamic LVEF on this study.  ______________________________________________________________________________  Left Ventricle  The left ventricle is normal in size. There is normal left ventricular wall  thickness. Left ventricular diastolic function is normal. An intracavitary  gradient is present. Hyperdynamic left ventricular function. The visual  ejection fraction is >70%. No regional wall motion abnormalities noted.     Right Ventricle  Normal right ventricle size and systolic  function.     Atria  Normal left atrial size. Right atrial size is normal. There is no color  Doppler evidence of an atrial shunt.     Mitral Valve  Mitral valve leaflets appear normal.     Tricuspid Valve  The tricuspid valve is not well visualized, but is grossly normal. Right  ventricular systolic pressure is elevated, consistent with mild pulmonary  hypertension. There is mild (1+) tricuspid regurgitation.     Aortic Valve  The aortic valve is trileaflet with aortic valve sclerosis. There is moderate  (2+) aortic regurgitation. No hemodynamically significant valvular aortic  stenosis.     Pulmonic Valve  The pulmonic valve is not well visualized. This degree of valvular  regurgitation is within normal limits.     Vessels  The aorta root is normal. Normal size ascending aorta. IVC diameter <2.1 cm  collapsing >50% with sniff suggests a normal RA pressure of 3 mmHg.     Pericardium  There is no pericardial effusion.     ______________________________________________________________________________  MMode/2D Measurements & Calculations  Ao root diam: 3.4 cm  LVOT diam: 2.0 cm  LVOT area: 3.1 cm2  Ao root diam index Ht(cm/m): 2.1     Ao root diam index BSA (cm/m2): 2.1  EF Biplane: 79.9 %  LA Volume Indexed (AL/bp): 19.6 ml/m2  RV Base: 3.0 cm  TAPSE: 1.5 cm     Time Measurements  Aortic HR: 98.0 BPM  MM HR: 89.0 BPM     Doppler Measurements & Calculations  MV E max dave: 68.7 cm/sec  MV A max dave: 123.0 cm/sec  MV E/A: 0.56  MV max P.9 mmHg  MV mean P.0 mmHg  MV V2 VTI: 18.7 cm  MVA(VTI): 4.1 cm2  Ao V2 max: 190.0 cm/sec  Ao max P.0 mmHg  Ao V2 mean: 133.0 cm/sec  Ao mean P.0 mmHg  Ao V2 VTI: 35.9 cm  MARIE(I,D): 2.1 cm2  MARIE(V,D): 2.7 cm2  AI P1/2t: 391.2 msec  LV V1 max PG: 10.6 mmHg  LV V1 max: 163.0 cm/sec  LV V1 VTI: 24.3 cm  CO(LVOT): 7.5 l/min  CI(LVOT): 4.6 l/min/m2  SV(LVOT): 76.3 ml  SI(LVOT): 46.8 ml/m2  TR max dave: 299.0 cm/sec  TR max P.8 mmHg  AV Dave Ratio (DI): 0.86  MARIE Index  (cm2/m2): 1.3  E/E': 10.7  E/E' avg: 10.0  Lateral E/e': 9.3  Medial E/e': 10.7  Peak E' Dave: 6.4 cm/sec  RV S Dave: 17.2 cm/sec     ______________________________________________________________________________  Report approved by: Deya Monique MD on 05/25/2025 03:53 PM         US Carotid Bilateral    Narrative    EXAM: US CAROTID BILATERAL  LOCATION: [facility]  DATE: 5/25/2025    INDICATION: Transient ischemic attack.  COMPARISON: None.  TECHNIQUE: Duplex exam performed utilizing 2D gray-scale imaging, Doppler interrogation with color-flow and spectral waveform analysis. The percent diameter stenosis is determined using Updated Recommendations for Carotid Stenosis Interpretation Criteria   from [employer].    FINDINGS:    RIGHT: Mild plaque at the bifurcation. The peak systolic velocity in the ICA is less than 180 cm/sec, consistent with less than 50% stenosis. Normal velocities in the ECA. Antegrade flow within the vertebral artery.     LEFT: Mild plaque at the bifurcation. The peak systolic velocity in the ICA is less than 180 cm/sec, consistent with less than 50% stenosis. Normal velocities in the ECA. Antegrade flow within the vertebral artery.    VELOCITY CHART:  CCA   Right: 64/11 cm/s   Left: 60/12 cm/s  ICA   Right: 128/24 cm/s   Left: 91/23 cm/s  ECA   Right: 97/13 cm/s   Left: 124/18 cm/s  ICA/CCA PSV Ratio   Right: 2.0   Left: 2.1      Impression    IMPRESSION:  1.  Mild plaque formation, velocities consistent with less than 50% stenosis in the right internal carotid artery. Elevated velocities in the proximal common carotid artery are favored to be due to vessel tortuosity.  2.  Mild plaque formation, velocities consistent with less than 50% stenosis in the left internal carotid artery.  3.  Flow within the vertebral arteries is antegrade.

## 2025-05-26 NOTE — PROGRESS NOTES
05/26/25 0916   Appointment Info   Signing Clinician's Name / Credentials (OT) Keri YEPEZ jessica Vinesmarie LAURENT/L   Living Environment   People in Home alone   Current Living Arrangements house   Home Accessibility wheelchair accessible   Transportation Anticipated family or friend will provide   Living Environment Comments Pt has ramped entrance and is able to live on one level.   Self-Care   Current Activity Tolerance moderate   Equipment Currently Used at Home walker, standard;shower chair   Fall history within last six months no   Activity/Exercise/Self-Care Comment Pt is independent with dressing and bathing.  Pt has assist meals, laundry, and cleaning.  Per chart, pt's daughter comes by multiple times a day.   General Information   Onset of Illness/Injury or Date of Surgery 05/24/25   Referring Physician Gondal   Patient/Family Therapy Goal Statement (OT) home   Additional Occupational Profile Info/Pertinent History of Current Problem Pt admitted with TIA, seizure disorder   Existing Precautions/Restrictions fall   Cognitive Status Examination   Cognitive Status Comments Pt is able to give history and follows directions.  Pt appears to have limited safety judgement.   Visual Perception   Visual Impairment/Limitations WFL   Sensory   Sensory Comments UE's intact   Range of Motion Comprehensive   Comment, General Range of Motion WFLs for ADLs   Strength Comprehensive (MMT)   Comment, General Manual Muscle Testing (MMT) Assessment WFLs for ADLs   Coordination   Upper Extremity Coordination No deficits were identified   Bed Mobility   Comment (Bed Mobility) Min A   Transfers   Transfer Comments Mod A   Balance   Balance Comments Min A   Activities of Daily Living   BADL Assessment/Intervention lower body dressing   Lower Body Dressing Assessment/Training   Comment, (Lower Body Dressing) SBA donning shoes   Clinical Impression   Criteria for Skilled Therapeutic Interventions Met (OT) Yes, treatment indicated   OT  Diagnosis impaired ADL independence   OT Problem List-Impairments impacting ADL activity tolerance impaired;balance;cognition;mobility   Assessment of Occupational Performance 3-5 Performance Deficits   Planned Therapy Interventions (OT) ADL retraining;balance training;cognition;transfer training   Clinical Decision Making Complexity (OT) detailed assessment/moderate complexity   Risk & Benefits of therapy have been explained evaluation/treatment results reviewed;participants included;patient   Clinical Impression Comments Pt seen bedside for OT eval and treatment.  Pt demonstrates decreased independence with ADLs and mobility as well as impaired cognition.  OT to continue to address.  Pt likely needs greater assist at home.   OT Total Evaluation Time   OT Eval, Moderate Complexity Minutes (26848) 10   Therapy Certification   Start of Care Date 05/26/25   Certification date from 05/26/25   Certification date to 06/02/25   Medical Diagnosis TIA   OT Goals   Therapy Frequency (OT) 5 times/week   OT Predicted Duration/Target Date for Goal Attainment 06/02/25   OT Goals Lower Body Dressing;Transfers;Toilet Transfer/Toileting   OT: Lower Body Dressing Supervision/stand-by assist   OT: Transfer Supervision/stand-by assist   OT: Toilet Transfer/Toileting Supervision/stand-by assist   OT Discharge Planning   OT Plan toileting, LB dressing with shorts, transfers   OT Discharge Recommendation (DC Rec) home with home care occupational therapy   OT Rationale for DC Rec Recommend home with assist and home OT.  Pt likely needs education on safe motility in her kitchen for meal prep.   OT Brief overview of current status Min A                                                                                   [facility]      OUTPATIENT OCCUPATIONAL THERAPY  EVALUATION  PLAN OF TREATMENT FOR OUTPATIENT REHABILITATION  (COMPLETE FOR INITIAL CLAIMS ONLY)  Patient's Last Name, First Name, M.I.  Date of  Birth:  1945  Julisa Chaney                          Provider's Name  [facility] Medical Record No.  [id number]                               Onset Date:  05/24/25   Start of Care Date:  (P) 05/26/25     Type:     ___PT   _X_OT   ___SLP Medical Diagnosis:  (P) TIA                        OT Diagnosis:  (P) impaired ADL independence   Visits from SOC:  1   _________________________________________________________________________________  Plan of Treatment/Functional Goals    Planned Interventions: (P) ADL retraining, balance training, cognition, transfer training   Goals: See Occupational Therapy Goals on Care Plan in [employer] electronic health record.    Therapy Frequency: (P) 5 times/week  Predicted Duration of Therapy Intervention: (P) 06/02/25  _________________________________________________________________________________    I CERTIFY THE NEED FOR THESE SERVICES FURNISHED UNDER        THIS PLAN OF TREATMENT AND WHILE UNDER MY CARE .             Physician Signature               Date    X_____________________________________________________                  Certification date from: (P) 05/26/25, Certification date to: (P) 06/02/25    Referring Physician: (SARA) Gondal            Initial Assessment        See Occupational Therapy evaluation dated (P) 05/26/25 in Epic electronic health record.

## 2025-05-26 NOTE — PROGRESS NOTES
PRIMARY DIAGNOSIS: Word finding difficulty   OUTPATIENT/OBSERVATION GOALS TO BE MET BEFORE DISCHARGE:  ADLs back to baseline: No    Activity and level of assistance: Up with maximum assistance. Consider SW and/or PT evaluation.     Pain status: Improved-controlled with oral pain medications.    Return to near baseline physical activity: Yes     Discharge Planner Nurse   Safe discharge environment identified: No  Barriers to discharge: Yes       Entered by: Nilda Virk RN 05/26/2025 4:12 AM     Please review provider order for any additional goals.   Nurse to notify provider when observation goals have been met and patient is ready for discharge.

## 2025-05-26 NOTE — PROGRESS NOTES
05/26/25 0920   Appointment Info   Signing Clinician's Name / Credentials (PT) Justine Morris PT   Quick Adds   Quick Adds Certification   Living Environment   People in Home alone   Current Living Arrangements house   Home Accessibility wheelchair accessible  (ramp, no stairs once in)   Transportation Anticipated family or friend will provide   Living Environment Comments per notes, daughter checks in on her 3x/day   Self-Care   Current Activity Tolerance fair   Equipment Currently Used at Home walker, rolling;wheelchair, manual   Activity/Exercise/Self-Care Comment pt states she is indep in ADL's, uses her wc most of the day but has to  the kitche.   General Information   Onset of Illness/Injury or Date of Surgery 05/24/25   Referring Physician S Gondal   Patient/Family Therapy Goals Statement (PT) wants to go home   Pertinent History of Current Problem (include personal factors and/or comorbidities that impact the POC) 80 year old female, history of HLD, migraines, trigeminal neuralgia and seizure disorder, who presents to this ED via walk-in for evaluation of word-finding difficulty.  Patient refusing MRI of the brain, CT head negative for acute process, was subsequently seen to be hypoxic, plan is to admit patient and monitor and give fluids and admitting to observation for acute hypoxia possibly self-induced, also for TIA/stroke workup if patient agrees for further evaluation.   Existing Precautions/Restrictions fall   Cognition   Affect/Mental Status (Cognition) WFL   Orientation Status (Cognition) oriented to;person;place   Follows Commands (Cognition) WFL   Pain Assessment   Patient Currently in Pain Yes, see Vital Sign flowsheet   Range of Motion (ROM)   ROM Comment LE ROM WFL   Strength (Manual Muscle Testing)   Strength Comments generalized weakness throughout, fatigues quickly   Bed Mobility   Bed Mobility supine-sit   Supine-Sit Dorsey (Bed Mobility) minimum assist (75% patient  effort);verbal cues   Impairments Contributing to Impaired Bed Mobility decreased strength   Assistive Device (Bed Mobility) bed rails   Comment, (Bed Mobility) kyphotic posture in sitting, no LOB but fatigues easily   Transfers   Transfers sit-stand transfer   Sit-Stand Transfer   Sit-Stand Marin (Transfers) moderate assist (50% patient effort);1 person assist;verbal cues   Assistive Device (Sit-Stand Transfers) walker, front-wheeled   Comment, (Sit-Stand Transfer) cues for hand placement, needs signif assist to initiate stand   Gait/Stairs (Locomotion)   Distance in Feet (Gait) 40   Comment, (Gait/Stairs) WC mobility in room and halls with GA/SBA - pt propels with feet and ue's   Clinical Impression   Criteria for Skilled Therapeutic Intervention Yes, treatment indicated   PT Diagnosis (PT) impaired functional mobility   Influenced by the following impairments weakness/fatigue   Functional limitations due to impairments bed mobility, transfers   Clinical Presentation (PT Evaluation Complexity) stable   Clinical Presentation Rationale Presents as diagnosed   Clinical Decision Making (Complexity) low complexity   Planned Therapy Interventions (PT) bed mobility training;transfer training;strengthening;gait training;wheelchair management/propulsion training   Risk & Benefits of therapy have been explained care plan/treatment goals reviewed;patient   PT Total Evaluation Time   PT Eval, Low Complexity Minutes (75562) 12   Therapy Certification   Start of care date 05/26/25   Certification date from 05/26/25   Certification date to 06/02/25   Medical Diagnosis word finding difficulty, TIA   Physical Therapy Goals   PT Frequency Daily   PT Predicted Duration/Target Date for Goal Attainment 05/30/25   PT Goals Bed Mobility;Transfers;Wheelchair Mobility;Gait   PT: Bed Mobility Independent   PT: Transfers Modified independent;Sit to/from stand;Bed to/from chair;Assistive device   PT: Gait Minimal assist;Rolling  walker;10 feet   PT: Wheelchair Mobility 150 feet;Caregiver SBA;manual wheelchair   Interventions   Interventions Quick Adds Wheelchair Mgmt/Training;Therapeutic Activity   Therapeutic Activity   Therapeutic Activities: dynamic activities to improve functional performance Minutes (96537) 12   Symptoms Noted During/After Treatment Fatigue   Treatment Detail/Skilled Intervention Static sitting tolerance at EOB, 5 min with SBA, cues for posture.  Add'l sit/stand and transfer wc to recliner with mod assist to initiate, min assist of 1 to complete transfer with vc   Wheelchair Managment/Training   Wheelchair Mgmt/Training Minutes (46047) 12   Symptoms Noted During/After Treatment Fatigue   Treatment Detail/Skilled Intervention slow moving, but indep for the most part.  total of 45' in room and hallway   PT Discharge Planning   PT Plan general strength/conditioning ex, transfers, gt short distances with FWW   PT Discharge Recommendation (DC Rec) home with assist;home with home care physical therapy   PT Rationale for DC Rec pt refusing TCU but is a high falls risk with her transfers.  If pt returnd home strongly recommend assist with all transfers and gt until pt back to baseline   PT Brief overview of current status min assist for bed mobility, mod assist for transfers, wc mob 45', SBA   PT Total Distance Amb During Session (feet) (S)    (wheelchair 45')   PT Equipment Needed at Discharge walker, rolling;wheelchair   Physical Therapy Time and Intention   Timed Code Treatment Minutes 24   Total Session Time (sum of timed and untimed services) 36   [facility]                                                                                   OUTPATIENT PHYSICAL THERAPY    PLAN OF TREATMENT FOR OUTPATIENT REHABILITATION   Patient's Last Name, First Name, Julisa Boss YOB: 1945   Provider's Name   [facility]   Medical Record  No.  [id number]     Onset Date: 05/24/25 Start of Care Date: 05/26/25     Medical Diagnosis:  word finding difficulty, TIA               PT Diagnosis:  impaired functional mobility Certification Dates:  From: 05/26/25  To: 06/02/25       See note for plan of treatment, functional goals, and certification details.    I CERTIFY THE NEED FOR THESE SERVICES FURNISHED UNDER        THIS PLAN OF TREATMENT AND WHILE UNDER MY CARE (Physician co-signature of this document indicates review and certification of the therapy plan).

## 2025-05-26 NOTE — PROGRESS NOTES
PRIMARY DIAGNOSIS:  Word finding difficulty  OUTPATIENT/OBSERVATION GOALS TO BE MET BEFORE DISCHARGE:  ADLs back to baseline: No    Activity and level of assistance: Up with maximum assistance. Consider SW and/or PT evaluation.     Pain status: Improved-controlled with oral pain medications.    Return to near baseline physical activity: Yes     Discharge Planner Nurse   Safe discharge environment identified: No  Barriers to discharge: Yes       Entered by: Nilda Virk RN 05/25/2025 11:48 PM    VSS in RA. Tele - NSR. Denies chest pain. Complains of headache reported some relief with Tylenol.  Have some difficulty finding word but can communicate well. Had 2x small loose BM. Narayan intact.- narayan cares done. Seizure pads in place. Takes pills whole with applesauce. PIV at R AC running NS 75ml/hr.      Please review provider order for any additional goals.   Nurse to notify provider when observation goals have been met and patient is ready for discharge.

## 2025-05-27 ENCOUNTER — APPOINTMENT (OUTPATIENT)
Dept: OCCUPATIONAL THERAPY | Facility: HOSPITAL | Age: 80
End: 2025-05-27
Attending: STUDENT IN AN ORGANIZED HEALTH CARE EDUCATION/TRAINING PROGRAM
Payer: COMMERCIAL

## 2025-05-27 ENCOUNTER — APPOINTMENT (OUTPATIENT)
Dept: PHYSICAL THERAPY | Facility: HOSPITAL | Age: 80
End: 2025-05-27
Attending: STUDENT IN AN ORGANIZED HEALTH CARE EDUCATION/TRAINING PROGRAM
Payer: COMMERCIAL

## 2025-05-27 LAB
10OH-CARBAZEPINE SERPL-MCNC: 9.1 UG/ML
ANION GAP SERPL CALCULATED.3IONS-SCNC: 5 MMOL/L (ref 7–15)
BUN SERPL-MCNC: 13.3 MG/DL (ref 8–23)
CALCIUM SERPL-MCNC: 8.1 MG/DL (ref 8.8–10.4)
CHLORIDE SERPL-SCNC: 113 MMOL/L (ref 98–107)
CREAT SERPL-MCNC: 0.66 MG/DL (ref 0.51–0.95)
EGFRCR SERPLBLD CKD-EPI 2021: 88 ML/MIN/1.73M2
ERYTHROCYTE [DISTWIDTH] IN BLOOD BY AUTOMATED COUNT: 14.2 % (ref 10–15)
GABAPENTIN SERPLBLD-MCNC: 3.2 UG/ML
GLUCOSE BLDC GLUCOMTR-MCNC: 84 MG/DL (ref 70–99)
GLUCOSE BLDC GLUCOMTR-MCNC: 99 MG/DL (ref 70–99)
GLUCOSE SERPL-MCNC: 75 MG/DL (ref 70–99)
HCO3 SERPL-SCNC: 26 MMOL/L (ref 22–29)
HCT VFR BLD AUTO: 43.2 % (ref 35–47)
HGB BLD-MCNC: 13.2 G/DL (ref 11.7–15.7)
MCH RBC QN AUTO: 27 PG (ref 26.5–33)
MCHC RBC AUTO-ENTMCNC: 30.6 G/DL (ref 31.5–36.5)
MCV RBC AUTO: 89 FL (ref 78–100)
PLATELET # BLD AUTO: 185 10E3/UL (ref 150–450)
POTASSIUM SERPL-SCNC: 3.1 MMOL/L (ref 3.4–5.3)
POTASSIUM SERPL-SCNC: 3.3 MMOL/L (ref 3.4–5.3)
POTASSIUM SERPL-SCNC: 4.7 MMOL/L (ref 3.4–5.3)
RBC # BLD AUTO: 4.88 10E6/UL (ref 3.8–5.2)
SODIUM SERPL-SCNC: 144 MMOL/L (ref 135–145)
TOPIRAMATE SERPL-MCNC: <1.5 UG/ML
WBC # BLD AUTO: 6.5 10E3/UL (ref 4–11)

## 2025-05-27 PROCEDURE — 250N000013 HC RX MED GY IP 250 OP 250 PS 637: Performed by: STUDENT IN AN ORGANIZED HEALTH CARE EDUCATION/TRAINING PROGRAM

## 2025-05-27 PROCEDURE — 36415 COLL VENOUS BLD VENIPUNCTURE: CPT

## 2025-05-27 PROCEDURE — 80048 BASIC METABOLIC PNL TOTAL CA: CPT

## 2025-05-27 PROCEDURE — 96361 HYDRATE IV INFUSION ADD-ON: CPT

## 2025-05-27 PROCEDURE — G0378 HOSPITAL OBSERVATION PER HR: HCPCS

## 2025-05-27 PROCEDURE — 97530 THERAPEUTIC ACTIVITIES: CPT | Mod: GP

## 2025-05-27 PROCEDURE — 97110 THERAPEUTIC EXERCISES: CPT | Mod: GP

## 2025-05-27 PROCEDURE — 84132 ASSAY OF SERUM POTASSIUM: CPT

## 2025-05-27 PROCEDURE — 85027 COMPLETE CBC AUTOMATED: CPT

## 2025-05-27 PROCEDURE — 250N000013 HC RX MED GY IP 250 OP 250 PS 637: Performed by: INTERNAL MEDICINE

## 2025-05-27 PROCEDURE — 82962 GLUCOSE BLOOD TEST: CPT

## 2025-05-27 PROCEDURE — 97110 THERAPEUTIC EXERCISES: CPT | Mod: GO

## 2025-05-27 PROCEDURE — 97535 SELF CARE MNGMENT TRAINING: CPT | Mod: GO

## 2025-05-27 PROCEDURE — 250N000013 HC RX MED GY IP 250 OP 250 PS 637

## 2025-05-27 RX ORDER — SODIUM CHLORIDE 9 MG/ML
INJECTION, SOLUTION INTRAVENOUS CONTINUOUS
Status: DISCONTINUED | OUTPATIENT
Start: 2025-05-27 | End: 2025-05-27

## 2025-05-27 RX ORDER — POTASSIUM CHLORIDE 1500 MG/1
40 TABLET, EXTENDED RELEASE ORAL ONCE
Status: COMPLETED | OUTPATIENT
Start: 2025-05-27 | End: 2025-05-27

## 2025-05-27 RX ORDER — POTASSIUM CHLORIDE 1.5 G/1.58G
40 POWDER, FOR SOLUTION ORAL ONCE
Status: COMPLETED | OUTPATIENT
Start: 2025-05-27 | End: 2025-05-27

## 2025-05-27 RX ADMIN — NEOMYCIN SULFATE, POLYMYXIN B SULFATE AND DEXAMETHASONE 1 DROP: 3.5; 10000; 1 SUSPENSION/ DROPS OPHTHALMIC at 16:41

## 2025-05-27 RX ADMIN — GABAPENTIN 100 MG: 100 CAPSULE ORAL at 08:15

## 2025-05-27 RX ADMIN — MIRTAZAPINE 15 MG: 15 TABLET, FILM COATED ORAL at 21:15

## 2025-05-27 RX ADMIN — NEOMYCIN SULFATE, POLYMYXIN B SULFATE AND DEXAMETHASONE 1 DROP: 3.5; 10000; 1 SUSPENSION/ DROPS OPHTHALMIC at 21:15

## 2025-05-27 RX ADMIN — NORTRIPTYLINE HYDROCHLORIDE 50 MG: 50 CAPSULE ORAL at 21:15

## 2025-05-27 RX ADMIN — ACETAMINOPHEN 650 MG: 325 TABLET ORAL at 21:21

## 2025-05-27 RX ADMIN — GABAPENTIN 300 MG: 300 CAPSULE ORAL at 21:13

## 2025-05-27 RX ADMIN — OXCARBAZEPINE 450 MG: 150 TABLET, FILM COATED ORAL at 21:14

## 2025-05-27 RX ADMIN — NEOMYCIN SULFATE, POLYMYXIN B SULFATE AND DEXAMETHASONE 1 DROP: 3.5; 10000; 1 SUSPENSION/ DROPS OPHTHALMIC at 09:17

## 2025-05-27 RX ADMIN — ACETAMINOPHEN AND CODEINE PHOSPHATE 1 TABLET: 300; 30 TABLET ORAL at 06:45

## 2025-05-27 RX ADMIN — LEVETIRACETAM 750 MG: 500 TABLET, FILM COATED ORAL at 21:13

## 2025-05-27 RX ADMIN — NEOMYCIN SULFATE, POLYMYXIN B SULFATE AND DEXAMETHASONE 1 DROP: 3.5; 10000; 1 SUSPENSION/ DROPS OPHTHALMIC at 03:48

## 2025-05-27 RX ADMIN — TOPIRAMATE 50 MG: 25 TABLET, FILM COATED ORAL at 21:15

## 2025-05-27 RX ADMIN — QUETIAPINE FUMARATE 50 MG: 25 TABLET ORAL at 21:14

## 2025-05-27 RX ADMIN — LEVETIRACETAM 750 MG: 500 TABLET, FILM COATED ORAL at 08:15

## 2025-05-27 RX ADMIN — FERROUS SULFATE TAB 325 MG (65 MG ELEMENTAL FE) 325 MG: 325 (65 FE) TAB at 08:15

## 2025-05-27 RX ADMIN — GABAPENTIN 100 MG: 100 CAPSULE ORAL at 16:41

## 2025-05-27 RX ADMIN — ASPIRIN 81 MG: 81 TABLET, COATED ORAL at 08:15

## 2025-05-27 RX ADMIN — POTASSIUM CHLORIDE 40 MEQ: 1500 TABLET, EXTENDED RELEASE ORAL at 06:45

## 2025-05-27 RX ADMIN — POTASSIUM CHLORIDE 40 MEQ: 1500 TABLET, EXTENDED RELEASE ORAL at 14:19

## 2025-05-27 RX ADMIN — ATORVASTATIN CALCIUM 40 MG: 40 TABLET, FILM COATED ORAL at 21:13

## 2025-05-27 ASSESSMENT — ACTIVITIES OF DAILY LIVING (ADL)
ADLS_ACUITY_SCORE: 69
ADLS_ACUITY_SCORE: 72
ADLS_ACUITY_SCORE: 69
ADLS_ACUITY_SCORE: 69
ADLS_ACUITY_SCORE: 68
ADLS_ACUITY_SCORE: 69
ADLS_ACUITY_SCORE: 68
ADLS_ACUITY_SCORE: 68
ADLS_ACUITY_SCORE: 69
ADLS_ACUITY_SCORE: 69
ADLS_ACUITY_SCORE: 68
ADLS_ACUITY_SCORE: 69
ADLS_ACUITY_SCORE: 69
ADLS_ACUITY_SCORE: 72
ADLS_ACUITY_SCORE: 68
ADLS_ACUITY_SCORE: 72
ADLS_ACUITY_SCORE: 69
ADLS_ACUITY_SCORE: 72

## 2025-05-27 NOTE — PLAN OF CARE
PRIMARY DIAGNOSIS: ACUTE PAIN  OUTPATIENT/OBSERVATION GOALS TO BE MET BEFORE DISCHARGE:  1. Pain Status: Improved-controlled with oral pain medications.    2. Return to near baseline physical activity: No    3. Cleared for discharge by consultants (if involved): No    Discharge Planner Nurse   Safe discharge environment identified: Yes  Barriers to discharge: Yes

## 2025-05-27 NOTE — PROGRESS NOTES
"PRIMARY DIAGNOSIS: \"GENERIC\" NURSING  OUTPATIENT/OBSERVATION GOALS TO BE MET BEFORE DISCHARGE:  ADLs back to baseline: Yes    Activity and level of assistance: assist of 1 - use wheelchair at home at baseline    Pain status: Improved-controlled with oral pain medications.    Return to near baseline physical activity: No     Discharge Planner Nurse   Safe discharge environment identified: No  Barriers to discharge: Yes       Entered by: Sunshine Guzman RN 05/27/2025 2:28 PM     It is unclear if patient is returning home or going to a TCU.   "

## 2025-05-27 NOTE — PROGRESS NOTES
"PRIMARY DIAGNOSIS: \"GENERIC\" NURSING  OUTPATIENT/OBSERVATION GOALS TO BE MET BEFORE DISCHARGE:  ADLs back to baseline: No    Activity and level of assistance: Assist x 1    Pain status: Improved-controlled with oral pain medications.    Return to near baseline physical activity: No     Discharge Planner Nurse   Safe discharge environment identified: No  Barriers to discharge: Yes       Entered by: Jake Mejia RN 05/26/2025 10:48 PM     A & O x 4. VSS on RA. Pain managed with PRN medication. Contact, seizure precautions maintained. Denies nausea/SOB. Tele: NSR. R PIV infusing NS @ 75 mL/hr. Easy to chew/mildly thick liquids diet, tolerating well. Strict I/O. Assist x 1 w/ walker, gait belt. Commode bedside, BM x 2. Nursing continue to monitor.  "

## 2025-05-27 NOTE — PLAN OF CARE
Goal Outcome Evaluation:      Plan of Care Reviewed With: patient  Overall Patient Progress: improving    Patient alert and oriented x 4. Patient very particular about taking PO meds. She wants them crushed but not too crushed in apple sauce. She will only take the K+ tablets, no packets. Patient is on Room Air. Patient has no IV - provider aware. Patient is no longer on Tele. Patient is not complaining of chest pain and does not seem to have any word trouble finding issue at present. Patient is on the K+ protocol - has needed replacing - last lab was 3.3. Patient states it is perpetually low. Patient is on an easy to chew and mild thick liquid diet. Patient remains on Contact precautions for MRSA.  It is still up for debate if she goes home or to a TCU.

## 2025-05-27 NOTE — PLAN OF CARE
Problem: Adult Inpatient Plan of Care  Goal: Absence of Hospital-Acquired Illness or Injury  Intervention: Identify and Manage Fall Risk  Recent Flowsheet Documentation  Taken 5/26/2025 2323 by Mell Leonardo, RN  Safety Promotion/Fall Prevention:   activity supervised   nonskid shoes/slippers when out of bed   patient and family education   room near nurse's station   safety round/check completed     Problem: Adult Inpatient Plan of Care  Goal: Optimal Comfort and Wellbeing  Intervention: Monitor Pain and Promote Comfort  Recent Flowsheet Documentation  Taken 5/27/2025 0339 by Mell Leonardo, RN  Pain Management Interventions: emotional support  Taken 5/26/2025 2336 by Mell Leonardo, RN  Pain Management Interventions:   medication (see MAR)   emotional support   rest       Goal Outcome Evaluation:    A&Ox4, VSS on RA. 10/10 trigeminal neuralgia (left side) - PRN Tylenol #3 given x2. Tele: NSR. Diet: easy to chew/bite sized - mildly thickened liquids. Strict I/O. A1 to commode. Bladder scanned x2 9 & 251 - voided 260 post BS. K protocol, diann @[facility]. Trending Na - 144 this AM. Hosp/PT/OT.

## 2025-05-27 NOTE — PROGRESS NOTES
[facility]    Medicine Progress Note - Hospitalist Service    Date of Admission:  5/24/2025    Assessment & Plan   Julisa Chaney is an 80 year old female, history of HLD, migraines, trigeminal neuralgia and seizure disorder, who presented to the ED for evaluation of word-finding difficulty. Patient refused MRI of the brain, CT head negative for acute process, had neurologic work up and consultation without significant findings. Possible TIA or medication induced encephalopathy / polypharmacy. When I spoke with the daughter, she states they brought the patient to the ER due to concerns about poor PO intake in the setting of intractable trigeminal neuralgia pain, and daughter states the word finding difficulty has been consistent with her being dehydrated in the past. Suspect trigeminal nerve pain flare followed by decreased oral intake and dehydration, then medication additions may have contributed to a temporary encephalopathy. She received IVF, had medication adjustments and had some improvement in her trigeminal neuralgia symptoms and was tolerating PO intake well today. PT/OT consulted, who are recommending TCU - awaiting placement     Transient Aphasia / Word Finding Difficulty  ? Transient Encephalopathy vs Polypharmacy   Seizure Disorder    Per admission notes, she was brought the ED for word finding difficulty.   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, intracranial atherosclerosis)  -- Carotid US with mild plaque formation but < 50% stenosis bilateral L and R ICA  -- Echocardiogram hyperdynamic LV with EF > 70% ; moderate aortic regurg, no WMA  -- Keppra level was therapeutic ; continue PTA Keppra   -- Neurology consult ; possible TIA and recommending starting ASA 81 mg daily    -- Other encephalopathy eval: UA unremarkable, lytes okay  -- Of note, she was  recently started on baclofen for her trigeminal neuralgia, so I wonder if there was some medication intoxication contributing (she also takes gabapentin, quetiapine, topiramate, oxzcarbazepine, mirtazapine, Tylenol #3)  -- Daughter states patient has had episodes like this in the past, which have correlated to her being dehydrated   -- Plan will be to discontinue Baclofen and start ASA per neuro recs for possible TIA  -- PT/OT ; home with assist     Left Trigeminal Neuralgia   Chronic Pain Syndrome   Was causing more pain, and per daughter, was the reason for presentation to the ED because symptoms were intractable and she was unable to eat/drink.   -- Baclofen 10 mg TID added several days ago, daughter states had only taken 1 dose   -- Discontinue baclofen given concern for polypharmacy  -- Neurontin increased to 100 -- 100 -- 300 mg   -- Continue nortriptyline, oxcarbazepine   -- Continue Tylenol #3     Dehydration / Poor PO intake with hemoconcentration and hyperdynamic LV function   Physical Deconditioning   Failure to Thrive / Cachexia   Poor PO intake in the setting of trigeminal neuralgia as above   -- S/p boluses and IVF   -- Today has been tolerating oral intake well so will discontinue further IVF  -- Hypernatremia has resolved   -- Encourage supplements ; ordered   -- PT/OT - TCU - appreciate CM assistance in placement     Hypernatremia, mild   Suspect due to poor PO intake   -- Resolved s/p IVF     Hypokalemia - replacement protocols     Transient Hypoxia (resolved)  Emphysema   History of Recurrent Aspiration PNA   Patient had some transient hypoxia in the ED, ED provider was suspecting some behavioral issues leading to acute hypoxia such as breath-holding.   Upon admission, she was no longer hypoxic and has been saturating well on RA   -- CT chest showing mild tree-in-bud opacities and bronchiectasis in the right lung in areas of infiltrates on prior CT, compatible with mild post-infectious fibrosis  with or without an element of chronic / indolent infection. Similar mild tree-in-bud opacity also seen in the left lower lobe which may represent similar chronic / indolent process. Stable moderate emphysema. No cough, fever or other URI symptoms to suggest active infection. Will not start on any antibiotics as low suspicion for any infectious etiology   -- Full viral panel, urine Legionella Streptococcus pneumonia antigen negative   -- Appreciate SLP consult ; Patient had no overt s/s aspiration with mildly thick or solid. Oral motor function was generally intact. Mastication was slow but functional. Patient knows what items she can and cannot chew given lack of dentition     ? Urinary Retention  Had Abrams catheter placed in the ED ; does not appear she has a real chart history of this outside of Abrams in ED from 2022  -- UA without infectious indicators   -- Abrams removed 5/26 and has voided okay since  -- Would recommend continuing to check intermittent PVRs at the TCU  -- Would discharge on scheduled bowel regimen ; noted moderate stool burden on CT    Mood Disorder   -- Continue PTA Neurontin, Keppra, mirtazapine, nortriptyline, Trileptal, quetiapine, Topamax    Acute Left Eye Conjunctivitis  Has appeared to be placed on neomycin polymyxin dexamethasone ophthalmic solution - Rx sent in discharge navigator by ED provider    Moderate Aortic Regurg - seen on echo, can follow-up outpatient with cardiology as desired     Hyperlipidemia - Total cholesterol of 251, LDL of 160  -- Started on Lipitor       Observation Goals: List all goals to be met before discharge home    , Free from ACUTE neuro deficits, Testing complete, Other: , Nurse to notify provider when observation goals have been met and patient is ready for discharge.  Diet: Combination Diet Easy to Chew (level 7); Mildly Thick (level 2)  Snacks/Supplements Adult: Boost Soothe; Between Meals    DVT Prophylaxis: Pneumatic Compression Devices  Abrams Catheter:  "Not present  Lines: None     Cardiac Monitoring: None  Code Status: Full Code      Clinically Significant Risk Factors Present on Admission        # Hypokalemia: Lowest K = 3.1 mmol/L in last 2 days, will replace as needed  # Hypernatremia: Highest Na = 146 mmol/L in last 2 days, will monitor as appropriate  # Hyperchloremia: Highest Cl = 113 mmol/L in last 2 days, will monitor as appropriate                            # Financial/Environmental Concerns: none         Social Drivers of Health    Tobacco Use: Medium Risk (5/21/2025)    Patient History     Smoking Tobacco Use: Former     Smokeless Tobacco Use: Never     Passive Exposure: Never   Physical Activity: Unknown (5/21/2025)    Exercise Vital Sign     Days of Exercise per Week: 0 days   Social Connections: Unknown (5/21/2025)    Social Connection and Isolation Panel [NHANES]     Frequency of Social Gatherings with Friends and Family: More than three times a week          Disposition Plan     Medically Ready for Discharge: Anticipated Tomorrow       The patient's care was discussed with the Attending Physician, Dr. Sekou Blackwell independently met with and assessed the patient and is in agreement with the assessment and plan     Yarely Daniel PA-C  Hospitalist Service  [facility]  Securely message with [employer] (more info)  Text page via [employer] Paging/Directory   ______________________________________________________________________    Interval History   Patient today sitting up in her recliner and drinking orange juice when I walked in.  She seems more engaged today.  States she feels \" o much better.\"  Trigeminal pain is still there but she states she was able to eat some dinner last night and ate her breakfast, and she is hoping to order lunch soon.  When asked if she would feel up to discharge today to home, she says she is nervous about some mobility there.  Today he is now agreeable to TCU so discussed would have her speak with our CM " team for placement.    Physical Exam   Vital Signs: Temp: 97.8  F (36.6  C) Temp src: Oral BP: 110/59 Pulse: 84   Resp: 16 SpO2: 94 % O2 Device: None (Room air)    Weight: 141 lbs 5.04 oz    Constitutional: awake, alert, cachetic, frail   Respiratory: No increased work of breathing, no accessory muscle use, clear to auscultation bilaterally, no crackles or wheezing  Cardiovascular: Regular rate and rhythm, normal S1 and S2, mild murmur  GI: Soft, non-distended, non-tender, normal bowel sounds, no masses palpated, no hepatosplenomegaly  Skin: Normal skin color, texture, turgor. No rashes and no jaundice  Musculoskeletal: no lower extremity pitting edema present.   Neurologic: Awake, alert. Slightly confused but answers questions appropriately    Medical Decision Making             Data     I have personally reviewed the following data over the past 24 hrs:    6.5  \   13.2   / 185     144 113 (H) 13.3 /  99   3.3 (L) 26 0.66 \       Imaging results reviewed over the past 24 hrs:   No results found for this or any previous visit (from the past 24 hours).

## 2025-05-27 NOTE — PROGRESS NOTES
"PRIMARY DIAGNOSIS: \"GENERIC\" NURSING  OUTPATIENT/OBSERVATION GOALS TO BE MET BEFORE DISCHARGE:  ADLs back to baseline: No    Activity and level of assistance: Assist x 1    Pain status: Improved-controlled with oral pain medications.    Return to near baseline physical activity: No     Discharge Planner Nurse   Safe discharge environment identified: No  Barriers to discharge: Yes        "

## 2025-05-27 NOTE — CONSULTS
"Care Management Follow Up    Length of Stay (days): 0    Expected Discharge Date: 05/27/2025    Anticipated Discharge Plan:  TCU placement    Transportation: Anticipate Wheelchair. Transportation costs discussed? Yes. Discussed with pt    PT Recommendations: home with assist, home with home care physical therapy  OT Recommendations:  home with home care occupational therapy     Barriers to Discharge: placement and transportation    Prior Living Situation: house (\"Ramp to enter the house and then no steps she has to use inside the house either.\") with alone    Discussed  Partnership in Safe Discharge Planning  document with patient/family: No     Handoff Completed: No, handoff not indicated or clinically appropriate    Patient/Spokesperson Updated: Yes. Who? pt    Additional Information:  Pt now wants to go to TCU. Pt has been to [facility], she will need WC transport. PAS.    11:10AM  Prior Auth submitted: PRIOR AUTH REQUEST ID# [id number]     11:55 AM  PAS done: [id number]    3:48 PM  No prior authorization as of now, CM rescheduled ride for same time tomorrow,  window is 5:30-6:30pm is the soonest they have otherwise it would be a 9am  time, and that would be too early to know if auth is approved. CM updated daughter.    Hafsa at [place] is updated.    Next Steps:   Prior Authorization for TCU  Orders    Mil Rivas RN    "

## 2025-05-28 VITALS
OXYGEN SATURATION: 95 % | WEIGHT: 141.31 LBS | TEMPERATURE: 98.3 F | RESPIRATION RATE: 18 BRPM | BODY MASS INDEX: 22.82 KG/M2 | HEART RATE: 74 BPM | DIASTOLIC BLOOD PRESSURE: 59 MMHG | SYSTOLIC BLOOD PRESSURE: 121 MMHG

## 2025-05-28 LAB
CODEINE UR CFM-MCNC: >9000 NG/ML
CODEINE/CREAT UR: ABNORMAL
DHC UR CFM-MCNC: 144 NG/ML
DHC/CREAT UR: 150 NG/MG {CREAT}
GABAPENTIN UR QL CFM: PRESENT
HYDROCODONE UR CFM-MCNC: 103 NG/ML
HYDROCODONE/CREAT UR: 107 NG/MG {CREAT}
HYDROMORPHONE UR CFM-MCNC: 61 NG/ML
HYDROMORPHONE/CREAT UR: 64 NG/MG {CREAT}
MORPHINE UR CFM-MCNC: >7000 NG/ML
MORPHINE/CREAT UR: ABNORMAL
NORCODEINE UR-MCNC: 6300 NG/ML
NORCODEINE/CREAT UR CFM: 6563 NG/MG {CREAT}
OXYCODONE UR CFM-MCNC: 350 NG/ML
OXYCODONE/CREAT UR: 365 NG/MG {CREAT}
POTASSIUM SERPL-SCNC: 3.6 MMOL/L (ref 3.4–5.3)
SODIUM SERPL-SCNC: 144 MMOL/L (ref 135–145)

## 2025-05-28 PROCEDURE — 84295 ASSAY OF SERUM SODIUM: CPT

## 2025-05-28 PROCEDURE — 250N000013 HC RX MED GY IP 250 OP 250 PS 637: Performed by: INTERNAL MEDICINE

## 2025-05-28 PROCEDURE — 97535 SELF CARE MNGMENT TRAINING: CPT | Mod: GO

## 2025-05-28 PROCEDURE — 36415 COLL VENOUS BLD VENIPUNCTURE: CPT

## 2025-05-28 PROCEDURE — G0378 HOSPITAL OBSERVATION PER HR: HCPCS

## 2025-05-28 PROCEDURE — 97110 THERAPEUTIC EXERCISES: CPT | Mod: GO

## 2025-05-28 PROCEDURE — 84132 ASSAY OF SERUM POTASSIUM: CPT

## 2025-05-28 PROCEDURE — 250N000013 HC RX MED GY IP 250 OP 250 PS 637

## 2025-05-28 PROCEDURE — 250N000013 HC RX MED GY IP 250 OP 250 PS 637: Performed by: STUDENT IN AN ORGANIZED HEALTH CARE EDUCATION/TRAINING PROGRAM

## 2025-05-28 PROCEDURE — 99239 HOSP IP/OBS DSCHRG MGMT >30: CPT | Performed by: EMERGENCY MEDICINE

## 2025-05-28 PROCEDURE — 99239 HOSP IP/OBS DSCHRG MGMT >30: CPT

## 2025-05-28 RX ORDER — ASPIRIN 81 MG/1
81 TABLET, COATED ORAL DAILY
Qty: 60 TABLET | Refills: 0 | Status: SHIPPED | OUTPATIENT
Start: 2025-05-28

## 2025-05-28 RX ORDER — GABAPENTIN 100 MG/1
100 CAPSULE ORAL 2 TIMES DAILY
Qty: 15 CAPSULE | Refills: 1 | Status: SHIPPED | OUTPATIENT
Start: 2025-05-28

## 2025-05-28 RX ORDER — ATORVASTATIN CALCIUM 40 MG/1
40 TABLET, FILM COATED ORAL EVERY EVENING
Qty: 60 TABLET | Refills: 0 | Status: SHIPPED | OUTPATIENT
Start: 2025-05-28

## 2025-05-28 RX ORDER — OXCARBAZEPINE 150 MG/1
TABLET, FILM COATED ORAL
Qty: 270 TABLET | Refills: 1 | Status: SHIPPED | OUTPATIENT
Start: 2025-05-28

## 2025-05-28 RX ORDER — ACETAMINOPHEN AND CODEINE PHOSPHATE 300; 30 MG/1; MG/1
0.5 TABLET ORAL EVERY 6 HOURS PRN
Qty: 15 TABLET | Refills: 0 | Status: SHIPPED | OUTPATIENT
Start: 2025-05-28

## 2025-05-28 RX ADMIN — FERROUS SULFATE TAB 325 MG (65 MG ELEMENTAL FE) 325 MG: 325 (65 FE) TAB at 08:42

## 2025-05-28 RX ADMIN — SENNOSIDES 2 TABLET: 8.6 TABLET, FILM COATED ORAL at 08:41

## 2025-05-28 RX ADMIN — LEVETIRACETAM 750 MG: 500 TABLET, FILM COATED ORAL at 08:41

## 2025-05-28 RX ADMIN — ASPIRIN 81 MG: 81 TABLET, COATED ORAL at 08:41

## 2025-05-28 RX ADMIN — ACETAMINOPHEN AND CODEINE PHOSPHATE 1 TABLET: 300; 30 TABLET ORAL at 08:42

## 2025-05-28 RX ADMIN — NEOMYCIN SULFATE, POLYMYXIN B SULFATE AND DEXAMETHASONE 1 DROP: 3.5; 10000; 1 SUSPENSION/ DROPS OPHTHALMIC at 09:43

## 2025-05-28 RX ADMIN — GABAPENTIN 100 MG: 100 CAPSULE ORAL at 08:41

## 2025-05-28 ASSESSMENT — ACTIVITIES OF DAILY LIVING (ADL)
ADLS_ACUITY_SCORE: 68

## 2025-05-28 NOTE — DISCHARGE SUMMARY
[facility]  Hospitalist Discharge Summary      Date of Admission:  5/24/2025  Date of Discharge:  5/28/2025  Discharging Provider: Apurva Kearney NP  Discharge Service: Hospitalist Service    Discharge Diagnoses   Word difficulty finding  Dehydration    Clinically Significant Risk Factors          Follow-ups Needed After Discharge   Follow-up Appointments       Follow Up and recommended labs and tests      Follow up with Nursing home physician.  No follow up labs or test are needed.                Unresulted Labs Ordered in the Past 30 Days of this Admission       Date and Time Order Name Status Description    5/21/2025  1:49 PM URINE DRUG CONFIRMATION PANEL In process             Discharge Disposition   Discharged to home  Condition at discharge: Stable    Hospital Course   Julisa Chaney is an 80 year old female, history of HLD, migraines, trigeminal neuralgia and seizure disorder, who presented to the ED for evaluation of word-finding difficulty. Patient refused MRI of the brain, CT head negative for acute process, had neurologic work up and consultation without significant findings. Possible TIA or medication induced encephalopathy / polypharmacy. When I spoke with the daughter, she states they brought the patient to the ER due to concerns about poor PO intake in the setting of intractable trigeminal neuralgia pain, and daughter states the word finding difficulty has been consistent with her being dehydrated in the past. Suspect trigeminal nerve pain flare followed by decreased oral intake and dehydration, then medication additions may have contributed to a temporary encephalopathy. She received IVF, had medication adjustments and had some improvement in her trigeminal neuralgia symptoms and was tolerating PO intake well today. PT/OT consulted, who are recommending TCU.  Case management to coordinate discharge to TCU today.  Patient and family agree with plan to discharge to TCU.      #Transient Aphasia / Word Finding Difficulty  #Transient Encephalopathy vs Polypharmacy   #Seizure Disorder    Per admission notes, she was brought the ED for word finding difficulty.   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, intracranial atherosclerosis)  -- Carotid US with mild plaque formation but < 50% stenosis bilateral L and R ICA  -- Echocardiogram hyperdynamic LV with EF > 70% ; moderate aortic regurg, no WMA  -- Keppra level was therapeutic ; continue PTA Keppra   -- Neurology consult ; possible TIA and recommending starting ASA 81 mg daily    -- Other encephalopathy eval: UA unremarkable, lytes okay  -- Of note, she was recently started on baclofen for her trigeminal neuralgia, so I wonder if there was some medication intoxication contributing (she also takes gabapentin, quetiapine, topiramate, oxzcarbazepine, mirtazapine, Tylenol #3)  -- Daughter states patient has had episodes like this in the past, which have correlated to her being dehydrated   -- Plan will be to discontinue Baclofen and start ASA per neuro recs for possible TIA  -- PT/OT ; TCU placement  -- Case management to coordinate discharge to TCU    #Left Trigeminal Neuralgia   Chronic Pain Syndrome   Was causing more pain, and per daughter, was the reason for presentation to the ED because symptoms were intractable and she was unable to eat/drink.   -- Baclofen 10 mg TID added several days ago, daughter states had only taken 1 dose   -- Discontinue baclofen given concern for polypharmacy  -- Neurontin increased to 100 -- 100 -- 300 mg   -- Continue nortriptyline, oxcarbazepine   -- Continue Tylenol #3     #Dehydration / Poor PO intake with hemoconcentration and hyperdynamic LV function   Physical Deconditioning   Failure to Thrive / Cachexia   Poor PO intake in the setting of trigeminal neuralgia as above   -- S/p  boluses and IVF   --  has been tolerating oral intake well so will discontinue further IVF  -- Hypernatremia has resolved   -- Encourage supplements ; ordered   -- PT/OT - TCU - appreciate CM assistance in placement     #Hypernatremia, mild   Suspect due to poor PO intake   -- Resolved s/p IVF     #Hypokalemia, improved  - replacement protocols   Potassium 3.6 upon discharge    #Transient Hypoxia (resolved)  Emphysema   History of Recurrent Aspiration PNA   Patient had some transient hypoxia in the ED, ED provider was suspecting some behavioral issues leading to acute hypoxia such as breath-holding.   Upon admission, she was no longer hypoxic and has been saturating well on RA   -- CT chest showing mild tree-in-bud opacities and bronchiectasis in the right lung in areas of infiltrates on prior CT, compatible with mild post-infectious fibrosis with or without an element of chronic / indolent infection. Similar mild tree-in-bud opacity also seen in the left lower lobe which may represent similar chronic / indolent process. Stable moderate emphysema. No cough, fever or other URI symptoms to suggest active infection. Will not start on any antibiotics as low suspicion for any infectious etiology   -- Full viral panel, urine Legionella Streptococcus pneumonia antigen negative   -- Appreciate SLP consult ; Patient had no overt s/s aspiration with mildly thick or solid. Oral motor function was generally intact. Mastication was slow but functional. Patient knows what items she can and cannot chew given lack of dentition     #Urinary Retention, improved  Had Abrams catheter placed in the ED ; does not appear she has a real chart history of this outside of Abrams in ED from 2022  -- UA without infectious indicators   -- Abrams removed 5/26 and has voided okay since  -- Would recommend continuing to check intermittent PVRs at the TCU  -- Would discharge on scheduled bowel regimen ; noted moderate stool burden on CT    Mood  Disorder   -- Continue PTA Neurontin, Keppra, mirtazapine, nortriptyline, Trileptal, quetiapine, Topamax    Acute Left Eye Conjunctivitis  Has appeared to be placed on neomycin polymyxin dexamethasone ophthalmic solution - Rx sent in discharge navigator by ED provider    Moderate Aortic Regurg - seen on echo, can follow-up outpatient with cardiology as desired     Hyperlipidemia - Total cholesterol of 251, LDL of 160  -- Started on Lipitor    Consultations This Hospital Stay   CARE MANAGEMENT / SOCIAL WORK IP CONSULT  SMOKING CESSATION PROGRAM IP CONSULT  NEUROLOGY IP CONSULT  PHYSICAL THERAPY ADULT IP CONSULT  OCCUPATIONAL THERAPY ADULT IP CONSULT  SPEECH LANGUAGE PATH ADULT IP CONSULT  CARE MANAGEMENT / SOCIAL WORK IP CONSULT  CARE MANAGEMENT / SOCIAL WORK IP CONSULT  CARE MANAGEMENT / SOCIAL WORK IP CONSULT  PHYSICAL THERAPY ADULT IP CONSULT  OCCUPATIONAL THERAPY ADULT IP CONSULT    Code Status   Full Code    Time Spent on this Encounter   IApurva NP, personally saw the patient today and spent greater than 30 minutes discharging this patient.       Apurva Kearney NP  [facility]  [address] [place] [id number]  Phone: [phone]  Fax: [phone]  ______________________________________________________________________    Physical Exam   Vital Signs: Temp: 98.3  F (36.8  C) Temp src: Oral BP: 121/59 Pulse: 74   Resp: 18 SpO2: 95 % O2 Device: None (Room air)    Weight: 141 lbs 5.04 oz  Constitutional: awake, alert, cooperative, no apparent distress, and appears stated age  Hematologic / Lymphatic: no cervical lymphadenopathy and no supraclavicular lymphadenopathy  Respiratory: No increased work of breathing, good air exchange, clear to auscultation bilaterally, no crackles or wheezing  Cardiovascular: Normal apical impulse, regular rate and rhythm, normal S1 and S2, no S3 or S4, and no murmur noted  GI: No scars, normal bowel sounds,  soft, non-distended, non-tender, no masses palpated, no hepatosplenomegally  Skin: no bruising or bleeding, normal skin color, texture, turgor, and no redness, warmth, or swelling  Musculoskeletal: There is no redness, warmth, or swelling of the joints.  Full range of motion noted.  Motor strength is 5 out of 5 all extremities bilaterally.  Neurologic: Awake, alert, oriented to name, place and time.   Neuropsychiatric: General: normal, calm, and normal eye contact       Primary Care Physician   Mara Murillo    Discharge Orders      General info for SNF    Length of Stay Estimate: Short Term Care: Estimated # of Days <30  Condition at Discharge: Stable  Level of care:skilled   Rehabilitation Potential: Good  Admission H&P remains valid and up-to-date: Yes  Recent Chemotherapy: N/A  Use Nursing Home Standing Orders: Yes     Mantoux instructions    Give two-step Mantoux (PPD) Per Facility Policy Yes     Follow Up and recommended labs and tests    Follow up with Nursing home physician.  No follow up labs or test are needed.     Reason for your hospital stay    Difficulty word finding     Activity - Up with nursing assistance     Full Code     Physical Therapy Adult Consult    Evaluate and treat as clinically indicated.    Reason:  weakness     Occupational Therapy Adult Consult    Evaluate and treat as clinically indicated.    Reason:  weakness     Contact Isolation     Fall precautions     Diet    Follow this diet upon discharge: Current Diet:Orders Placed This Encounter      Snacks/Supplements Adult: Boost Soothe; Between Meals      Combination Diet Easy to Chew (level 7); Mildly Thick (level 2)       Significant Results and Procedures   Most Recent 3 CBC's:  Recent Labs   Lab Test 05/27/25  0537 05/26/25  0612 05/25/25  0643 05/24/25  1219   WBC 6.5 7.7 10.4 7.8   HGB 13.2 14.2 15.6 16.3*   MCV 89 87 87 88    217  --  205     Most Recent 3 BMP's:  Recent Labs   Lab Test 05/28/25  0620 05/27/25 2003  05/27/25  1142 05/27/25  1133 05/27/25  0750 05/27/25  0537 05/26/25  2119 05/26/25  1017 05/26/25  0618 05/25/25  0801 05/25/25  0643     --   --   --   --  144 145  --  146*  --  146*   POTASSIUM 3.6 4.7 3.3*  --   --  3.1*  --    < > 3.2*  --  3.6   CHLORIDE  --   --   --   --   --  113*  --   --  113*  --  104   CO2  --   --   --   --   --  26  --   --  25  --  28   BUN  --   --   --   --   --  13.3  --   --  14.7  --  18.1   CR  --   --   --   --   --  0.66  --   --  0.62  --  0.66   ANIONGAP  --   --   --   --   --  5*  --   --  8  --  14   ADY  --   --   --   --   --  8.1*  --   --  8.1*  --  8.9   GLC  --   --   --  99 84 75  --    < > 90   < > 128*    < > = values in this interval not displayed.   ,   Results for orders placed or performed during the hospital encounter of 05/24/25   CT Chest Abdomen Pelvis w/o Contrast    Narrative    EXAM: CT CHEST ABDOMEN PELVIS W/O CONTRAST  LOCATION: [facility]  DATE: 5/24/2025    INDICATION: chest and abdominal pain.  COMPARISON: CT abdomen pelvis 5/28/2024; CT chest 8/11/2024  TECHNIQUE: CT scan of the chest, abdomen, and pelvis was performed without IV contrast. Multiplanar reformats were obtained. Dose reduction techniques were used.   CONTRAST: None.    FINDINGS:   LUNGS AND PLEURA: Stable moderate emphysema. Mild tree-in-bud opacities with scattered areas of bronchiectasis again seen in the right upper, right middle and right lower lobes in similar distribution as prior infiltrates, compatible with postinfectious   scarring, with or without mild active infection. Scattered tree-in-bud opacities also seen in the left lower lobe. Mild scarring/atelectasis in the left lower lobe and lingula. No consolidation, pleural effusion or pneumothorax.    MEDIASTINUM/AXILLAE: Normal.    CORONARY ARTERY CALCIFICATION: Moderate.    HEPATOBILIARY: Normal liver. Gallbladder is decompressed and again diffusely thick-walled, unchanged    PANCREAS:  Normal.    SPLEEN: Normal.    ADRENAL GLANDS: Normal.    KIDNEYS/BLADDER: Normal.    BOWEL: No abnormal bowel thickening or bowel obstruction. Appendix is not visualized but no pericecal inflammation is present. No free fluid or free air. Moderate amount of stool seen within the rectum. A gastric fundal diverticulum is again noted.    LYMPH NODES: Normal.    VASCULATURE: Moderate aortoiliofemoral atherosclerotic calcifications. No abdominal aortic aneurysm.    PELVIC ORGANS: Status post hysterectomy. No abnormal adnexal masses.    MUSCULOSKELETAL: Moderate degenerative disc space narrowing from L4 to S1. Stable thoracic kyphosis. No suspicious osseous lesions or acute fractures.        Impression    IMPRESSION:    1.  Mild tree-in-bud opacities and bronchiectasis in the right upper, right middle and right lower lobes in areas of infiltrates on prior CT, compatible with mild postinfectious fibrosis with or without an element of chronic/indolent infection. Similar   mild tree-in-bud opacity also seen in the left lower lobe which may represent similar chronic/indolent process.    2.  Stable moderate emphysema.    3.  Persistent diffuse wall thickening of the decompressed gallbladder unchanged dating back to 5/28/2024, possibly representing diffuse adenomyomatosis.    4.  Moderate rectal stool burden.    5.  No acute process in the abdomen and pelvis.   Head CT w/o contrast    Narrative    EXAM: CT HEAD W/O CONTRAST  LOCATION: [facility]  DATE: 5/24/2025    INDICATION: word finding difficulty  COMPARISON: Head CT dated 01/17/2024.  TECHNIQUE: Routine CT Head without IV contrast. Multiplanar reformats. Dose reduction techniques were used.    FINDINGS:  INTRACRANIAL CONTENTS: No intracranial hemorrhage, extraaxial collection, or mass effect.  No CT evidence of acute territorial infarct. 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 Stable area of encephalomalacia and gliosis  in the   left superior frontal gyrus. Mild to moderate presumed chronic small vessel ischemic changes. Mild to moderate generalized volume loss. No hydrocephalus. Atherosclerotic calcifications of the carotid siphons and vertebral arteries.    VISUALIZED ORBITS/SINUSES/MASTOIDS: Rightward gaze. Prior bilateral cataract surgery. Visualized portions of the orbits are otherwise unremarkable. No paranasal sinus mucosal disease. No middle ear or mastoid effusion.    BONES/SOFT TISSUES: No acute abnormality. Left frontal kelsey hole.      Impression    IMPRESSION:  1.  No CT evidence for acute territorial infarction or intracranial hemorrhage.  2.  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 Stable small area of encephalomalacia and gliosis in the left superior frontal gyrus.  3.  Intracranial atherosclerosis.     US Carotid Bilateral    Narrative    EXAM: US CAROTID BILATERAL  LOCATION: [facility]  DATE: 5/25/2025    INDICATION: Transient ischemic attack.  COMPARISON: None.  TECHNIQUE: Duplex exam performed utilizing 2D gray-scale imaging, Doppler interrogation with color-flow and spectral waveform analysis. The percent diameter stenosis is determined using Updated Recommendations for Carotid Stenosis Interpretation Criteria   from [employer].    FINDINGS:    RIGHT: Mild plaque at the bifurcation. The peak systolic velocity in the ICA is less than 180 cm/sec, consistent with less than 50% stenosis. Normal velocities in the ECA. Antegrade flow within the vertebral artery.     LEFT: Mild plaque at the bifurcation. The peak systolic velocity in the ICA is less than 180 cm/sec, consistent with less than 50% stenosis. Normal velocities in the ECA. Antegrade flow within the vertebral artery.    VELOCITY CHART:  CCA   Right: 64/11 cm/s   Left: 60/12 cm/s  ICA   Right: 128/24 cm/s   Left: 91/23 cm/s  ECA   Right: 97/13 cm/s   Left: 124/18 cm/s  ICA/CCA PSV Ratio   Right:  2.0   Left: 2.1      Impression    IMPRESSION:  1.  Mild plaque formation, velocities consistent with less than 50% stenosis in the right internal carotid artery. Elevated velocities in the proximal common carotid artery are favored to be due to vessel tortuosity.  2.  Mild plaque formation, velocities consistent with less than 50% stenosis in the left internal carotid artery.  3.  Flow within the vertebral arteries is antegrade.   Echocardiogram Complete     Value    LVEF  >70%    [facility]    [id number]  [id number]  [id number]  [id number]^GONDAL^ERIS^[place] [address]     Name: CHELITA SAWANT  MRN: [id number]  : 1945  Study Date: 2025 02:38 PM  Age: 80 yrs  Gender: Female  Patient [facility]: [facility]  Reason For Study: Mechanical Complication, stroke  Ordering Physician: GONDAL, SAAD J  Performed By: ANNALISE     BSA: 1.6 m2  Height: 65 in  Weight: 127 lb  HR: 95  BP: 144/65 mmHg  ______________________________________________________________________________  Procedure  Echocardiogram with two-dimensional, color and spectral Doppler. Definity (NDC  #03848-007) given intravenously. The study was technically difficult. Compared  to the prior study dated 22, there are changes as noted.  ______________________________________________________________________________  Interpretation Summary     Hyperdynamic left ventricular function  The visual ejection fraction is >70%.  Normal right ventricle size and systolic function.  An intracavitary gradient is present.  There is moderate (2+) aortic regurgitation.  The study was technically difficult.  Compared to the prior study dated 22, there are changes as noted.  Hyperdynamic LVEF on this study.  ______________________________________________________________________________  Left Ventricle  The left ventricle is normal in size. There is normal left ventricular wall  thickness. Left ventricular diastolic function is  normal. An intracavitary  gradient is present. Hyperdynamic left ventricular function. The visual  ejection fraction is >70%. No regional wall motion abnormalities noted.     Right Ventricle  Normal right ventricle size and systolic function.     Atria  Normal left atrial size. Right atrial size is normal. There is no color  Doppler evidence of an atrial shunt.     Mitral Valve  Mitral valve leaflets appear normal.     Tricuspid Valve  The tricuspid valve is not well visualized, but is grossly normal. Right  ventricular systolic pressure is elevated, consistent with mild pulmonary  hypertension. There is mild (1+) tricuspid regurgitation.     Aortic Valve  The aortic valve is trileaflet with aortic valve sclerosis. There is moderate  (2+) aortic regurgitation. No hemodynamically significant valvular aortic  stenosis.     Pulmonic Valve  The pulmonic valve is not well visualized. This degree of valvular  regurgitation is within normal limits.     Vessels  The aorta root is normal. Normal size ascending aorta. IVC diameter <2.1 cm  collapsing >50% with sniff suggests a normal RA pressure of 3 mmHg.     Pericardium  There is no pericardial effusion.     ______________________________________________________________________________  MMode/2D Measurements & Calculations  Ao root diam: 3.4 cm  LVOT diam: 2.0 cm  LVOT area: 3.1 cm2  Ao root diam index Ht(cm/m): 2.1     Ao root diam index BSA (cm/m2): 2.1  EF Biplane: 79.9 %  LA Volume Indexed (AL/bp): 19.6 ml/m2  RV Base: 3.0 cm  TAPSE: 1.5 cm     Time Measurements  Aortic HR: 98.0 BPM  MM HR: 89.0 BPM     Doppler Measurements & Calculations  MV E max mikal: 68.7 cm/sec  MV A max mikal: 123.0 cm/sec  MV E/A: 0.56  MV max P.9 mmHg  MV mean P.0 mmHg  MV V2 VTI: 18.7 cm  MVA(VTI): 4.1 cm2  Ao V2 max: 190.0 cm/sec  Ao max P.0 mmHg  Ao V2 mean: 133.0 cm/sec  Ao mean P.0 mmHg  Ao V2 VTI: 35.9 cm  MARIE(I,D): 2.1 cm2  MARIE(V,D): 2.7 cm2  AI P1/2t: 391.2 msec  LV V1 max  PG: 10.6 mmHg  LV V1 max: 163.0 cm/sec  LV V1 VTI: 24.3 cm  CO(LVOT): 7.5 l/min  CI(LVOT): 4.6 l/min/m2  SV(LVOT): 76.3 ml  SI(LVOT): 46.8 ml/m2  TR max dave: 299.0 cm/sec  TR max P.8 mmHg  AV Dave Ratio (DI): 0.86  MARIE Index (cm2/m2): 1.3  E/E': 10.7  E/E' avg: 10.0  Lateral E/e': 9.3  Medial E/e': 10.7  Peak E' Dave: 6.4 cm/sec  RV S Dave: 17.2 cm/sec     ______________________________________________________________________________  Report approved by: Deya Moinque MD on 2025 03:53 PM           *Note: Due to a large number of results and/or encounters for the requested time period, some results have not been displayed. A complete set of results can be found in Results Review.       Discharge Medications   Current Discharge Medication List        START taking these medications    Details   aspirin (ASA) 81 MG EC tablet Take 1 tablet (81 mg) by mouth daily.  Qty: 60 tablet, Refills: 0    Associated Diagnoses: Chest pain, unspecified type      atorvastatin (LIPITOR) 40 MG tablet Take 1 tablet (40 mg) by mouth every evening.  Qty: 60 tablet, Refills: 0    Associated Diagnoses: Pure hypercholesterolemia      erythromycin (ROMYCIN) 5 MG/GM ophthalmic ointment Place 0.5 inches Into the left eye 4 times daily for 7 days.  Qty: 3.5 g, Refills: 0           CONTINUE these medications which have CHANGED    Details   acetaminophen-codeine (TYLENOL #3) 300-30 MG per tablet Take 0.5 tablets by mouth every 6 hours as needed for severe pain.  Qty: 15 tablet, Refills: 0    Associated Diagnoses: Trigeminal neuralgia; Chronic pain syndrome; Continuous opioid dependence (H)      !! gabapentin (NEURONTIN) 100 MG capsule Take 1 capsule (100 mg) by mouth 2 times daily. Take 1 capsule in the morning and 1 capsule at night  Qty: 15 capsule, Refills: 1    Associated Diagnoses: Trigeminal neuralgia      OXcarbazepine (TRILEPTAL) 150 MG tablet TAKE 3 TABLETS(450 MG) BY MOUTH DAILY  Qty: 270 tablet, Refills: 1    Associated  Diagnoses: Facial neuralgia       !! - Potential duplicate medications found. Please discuss with provider.        CONTINUE these medications which have NOT CHANGED    Details   ferrous sulfate (FEROSUL) 325 (65 Fe) MG tablet Take 1 tablet (325 mg) by mouth daily (with breakfast).  Qty: 90 tablet, Refills: 1    Associated Diagnoses: Iron deficiency anemia due to chronic blood loss      !! gabapentin (NEURONTIN) 300 MG capsule Take 1 capsule (300 mg) by mouth at bedtime.  Qty: 90 capsule, Refills: 2    Associated Diagnoses: Trigeminal neuralgia      levETIRAcetam (KEPPRA) 750 MG tablet Take 1 tablet (750 mg) by mouth 2 times daily.    Associated Diagnoses: Seizure disorder (H)      loperamide (IMODIUM) 2 MG capsule Take 1 capsule (2 mg) by mouth 4 times daily as needed for diarrhea    Associated Diagnoses: Aspiration pneumonia of right lung, unspecified aspiration pneumonia type, unspecified part of lung (H); Acute respiratory failure with hypoxia (H)      menthol-zinc oxide (CALMOSEPTINE) 0.44-20.6 % OINT ointment Apply topically 4 times daily as needed for skin protection (for sores on buttox from sitting)      METAMUCIL FIBER PO Take 1 capsule by mouth daily      mirtazapine (REMERON) 15 MG tablet TAKE 1 TABLET(15 MG) BY MOUTH AT BEDTIME  Qty: 30 tablet, Refills: 0    Associated Diagnoses: Current mild episode of major depressive disorder without prior episode      Multiple Vitamin (MULTIVITAMIN) TABS Take 1 tablet by mouth daily.  Qty: 100 tablet, Refills: 3    Associated Diagnoses: Trigeminal nerve disorder      nortriptyline (PAMELOR) 50 MG capsule Take 1 capsule (50 mg) by mouth at bedtime.  Qty: 90 capsule, Refills: 3    Associated Diagnoses: Trigeminal neuralgia; Chronic pain syndrome      potassium chloride elin ER (KLOR-CON M20) 20 MEQ CR tablet Take 1 tablet (20 mEq) by mouth daily.  Qty: 90 tablet, Refills: 1    Associated Diagnoses: Anxiety      QUEtiapine (SEROQUEL) 50 MG tablet TAKE 1 TABLET(50 MG) BY  MOUTH AT BEDTIME  Qty: 30 tablet, Refills: 0    Associated Diagnoses: Anxiety      senna-docusate (SENOKOT-S/PERICOLACE) 8.6-50 MG tablet Take 1 tablet by mouth 2 times daily.      topiramate (TOPAMAX) 50 MG tablet TAKE 1 TABLET(50 MG) BY MOUTH AT BEDTIME  Qty: 90 tablet, Refills: 3    Comments: **Patient requests 90 days supply**  Associated Diagnoses: Intractable chronic migraine without aura and without status migrainosus      Vitamin D3 50 mcg (2000 units) tablet Take 1 tablet (50 mcg) by mouth daily.  Qty: 90 tablet, Refills: 1    Associated Diagnoses: Vitamin D deficiency       !! - Potential duplicate medications found. Please discuss with provider.        STOP taking these medications       baclofen (LIORESAL) 10 MG tablet Comments:   Reason for Stopping:             Allergies   Allergies   Allergen Reactions    Contrast [Iohexol] Rash     Noticed during 08/2020 admission. Received IV contrast on 8/5    Chocolate Headache    Contrast Dye Rash    Diatrizoate Rash    Penicillins Rash     Tolerated 8 week course of Augmentin in 2015

## 2025-05-28 NOTE — PLAN OF CARE
Physical Therapy Discharge Summary    Reason for therapy discharge:    Discharged to transitional care facility.    Progress towards therapy goal(s). See goals on Care Plan in [facility] electronic health record for goal details.  Goals partially met.  Barriers to achieving goals:   limited tolerance for therapy and discharge from facility.    Therapy recommendation(s):    Continued therapy is recommended.  Rationale/Recommendations:  PT goals not fully met.

## 2025-05-28 NOTE — PLAN OF CARE
Occupational Therapy Discharge Summary    Reason for therapy discharge:    Discharged to transitional care facility.    Progress towards therapy goal(s). See goals on Care Plan in [facility] electronic health record for goal details.  Goals partially met.  Barriers to achieving goals:   discharge from facility.    Therapy recommendation(s):    Recommend continued OT at TCU

## 2025-05-28 NOTE — PLAN OF CARE
Goal Outcome Evaluation:      Plan of Care Reviewed With: patient    Overall Patient Progress: improving    Patient alert and oriented x4. Patient will be going to [facility] TCU. Patient is on the K+ protocol.  - 3.6 -= am draw tomorrow. Patient does not seem to have any word finding difficulty at this time. Patient remains on Contract precautions for MRSA. Patient is not complaining of Chest pain.     Patient discharged to transitional care unit at 1015 via Private Car.  Accompanied by granddaughter and staff.  Discharge instructions were reviewed with patient, opportunity offered to ask questions.    Prescriptions e-prescribed to pharmacy.  Access discontinued: No - no IV  Care plan and education discontinued: Yes  Home meds retrieved from pharmacy: N/A  Belongings were sent home with patient/family:  Clothing: Shirt(s):  , Pants:  , and Underclothes:  , Contacts/Glasses:  , and Shoes:  .     Patient has own wheelchair that she left with off the unit.

## 2025-05-28 NOTE — PLAN OF CARE
Goal: Improved Sleep  Outcome: Progressing   Goal Outcome Evaluation:       Neuro: A&Ox4.   Cardiac: Afebrile, VSS.   Respiratory: RA  GI/: Voiding spontaneously. No BM this shift.  Diet/appetite: Tolerating easy to chew diet. Denies nausea.  Activity: Up with 1 assist    Pain: Denies   Skin: No new deficits noted.  Lines: piv SL  Drains: NO  Replacements: K protocol

## 2025-05-28 NOTE — PLAN OF CARE
"PRIMARY DIAGNOSIS: \"GENERIC\" NURSING  OUTPATIENT/OBSERVATION GOALS TO BE MET BEFORE DISCHARGE:  ADLs back to baseline: No    Activity and level of assistance: Up with standby assistance.    Pain status: Improved-controlled with oral pain medications.    Return to near baseline physical activity: No     Discharge Planner Nurse   Safe discharge environment identified: Yes  Barriers to discharge: Yes       Entered by: Medina Sharpe RN 05/28/2025 5:37 AM     Please review provider order for any additional goals.   Nurse to notify provider when observation goals have been met and patient is ready for discharge.Goal Outcome Evaluation:                            "

## 2025-05-28 NOTE — PROGRESS NOTES
Care Management Discharge Note    Discharge Date: 05/28/2025       Discharge Disposition: Transitional Care ([facility])    Discharge Services: None    Discharge DME: None    Discharge Transportation: Granddaughter will transport her to the TCU at around 10AM    Private pay costs discussed: Not applicable    Does the patient's insurance plan have a 3 day qualifying hospital stay waiver?  No    PAS Confirmation Code: [id number]  Patient/family educated on Medicare website which has current facility and service quality ratings: yes    Education Provided on the Discharge Plan: Yes  Persons Notified of Discharge Plans: yes  Patient/Family in Agreement with the Plan:      Handoff Referral Completed: No, handoff not indicated or clinically appropriate    Additional Information:  Pt adequate for discharge. Discharge orders placed. Therapy recommendation is TCU and pt has been accepted to [facility]. Pt and daughter, Alissa are accepting of this TCU and discharge plan. Prior auth has been approved and faxed over to TCU. Transport has been discussed with pt by previous CM.  They would like CM to set up transport and do not have a transport agency that they prefer to use. Previous CM discussed [employer] transport and went over possible out of pocket cost with patient and they are accepting. Previous CM then set up WC ride with [employer] for [id number]. Today ride was rescheduled to [id number] as pt is ready and auth has been approved. Went to pt room to discuss this and go over transport and pt tells writer that her daughter will be transporting her. Writer then called pt's daughter and discussed transport and daughter tells writer that the family wants to transport the pt, and will be there around 10AM to bring pt to TCU. Writer then canceled the ride that was set up. No further CM needs at this time. CM will sign off.     Leigh Yin, RN

## 2025-05-28 NOTE — PLAN OF CARE
"PRIMARY DIAGNOSIS: \"GENERIC\" NURSING  OUTPATIENT/OBSERVATION GOALS TO BE MET BEFORE DISCHARGE:  ADLs back to baseline: No    Activity and level of assistance: Up with 1 xassistance.    Pain status: Improved-controlled with oral pain medications.    Return to near baseline physical activity: No     Discharge Planner Nurse   Safe discharge environment identified: Yes  Barriers to discharge: Yes       Entered by: Medina Sharpe RN 05/27/2025 11:00 PM     Please review provider order for any additional goals.   Nurse to notify provider when observation goals have been met and patient is ready for discharge.Goal Outcome Evaluation:                            "

## 2025-05-29 ENCOUNTER — DOCUMENTATION ONLY (OUTPATIENT)
Dept: GERIATRICS | Facility: CLINIC | Age: 80
End: 2025-05-29
Payer: COMMERCIAL

## 2025-05-29 ENCOUNTER — PATIENT OUTREACH (OUTPATIENT)
Dept: CARE COORDINATION | Facility: CLINIC | Age: 80
End: 2025-05-29
Payer: COMMERCIAL

## 2025-05-29 NOTE — PROGRESS NOTES
[facility]    Background: Transitional Care Management program identified per system criteria and reviewed by [facility] team for possible outreach.    Assessment: Upon chart review, CCR Team member will not proceed with patient outreach related to this episode of Transitional Care Management program due to reason below:    [facility] TCU: Patient discharged to TCU/ARU/LTACH and is established within [facility] Primary [facility]. Referral created for Primary Care-Care Coordination program.    Plan: Transitional Care Management episode addressed appropriately per reason noted above.      ANALISA Sánchez  [phone]  [facility]     *Connected Care Resource Team does NOT follow patient ongoing. Referrals are identified based on internal discharge reports and the outreach is to ensure patient has an understanding of their discharge instructions.

## 2025-05-30 PROBLEM — R09.02 HYPOXIA: Status: RESOLVED | Noted: 2025-05-24 | Resolved: 2025-05-30

## 2025-05-30 PROBLEM — K52.831 COLLAGENOUS COLITIS: Status: RESOLVED | Noted: 2024-01-30 | Resolved: 2025-05-30

## 2025-05-30 PROBLEM — E86.0 DEHYDRATION: Status: RESOLVED | Noted: 2023-10-22 | Resolved: 2025-05-30

## 2025-05-30 PROBLEM — R62.7 FAILURE TO THRIVE IN ADULT: Status: RESOLVED | Noted: 2023-10-25 | Resolved: 2025-05-30

## 2025-05-30 PROBLEM — E87.0 HYPERNATREMIA: Status: RESOLVED | Noted: 2024-01-30 | Resolved: 2025-05-30

## 2025-05-30 PROBLEM — H10.32 ACUTE BACTERIAL CONJUNCTIVITIS OF LEFT EYE: Status: RESOLVED | Noted: 2025-05-24 | Resolved: 2025-05-30

## 2025-05-30 PROBLEM — A41.9 SEPSIS (H): Status: RESOLVED | Noted: 2024-05-29 | Resolved: 2025-05-30

## 2025-05-30 PROBLEM — K52.9 CHRONIC DIARRHEA: Status: RESOLVED | Noted: 2024-01-30 | Resolved: 2025-05-30

## 2025-05-30 PROBLEM — E46 MALNUTRITION, UNSPECIFIED TYPE: Status: RESOLVED | Noted: 2023-06-01 | Resolved: 2025-05-30

## 2025-05-30 PROBLEM — R62.7 ADULT FAILURE TO THRIVE: Status: RESOLVED | Noted: 2024-01-17 | Resolved: 2025-05-30

## 2025-05-30 PROBLEM — J69.0 ASPIRATION PNEUMONIA OF RIGHT LUNG, UNSPECIFIED ASPIRATION PNEUMONIA TYPE, UNSPECIFIED PART OF LUNG (H): Status: RESOLVED | Noted: 2024-08-11 | Resolved: 2025-05-30

## 2025-05-30 PROBLEM — J18.9 BILATERAL PNEUMONIA: Status: RESOLVED | Noted: 2024-05-29 | Resolved: 2025-05-30

## 2025-05-30 PROBLEM — N39.0 ACUTE UTI: Status: RESOLVED | Noted: 2024-05-29 | Resolved: 2025-05-30

## 2025-06-02 ENCOUNTER — PATIENT OUTREACH (OUTPATIENT)
Dept: CARE COORDINATION | Facility: CLINIC | Age: 80
End: 2025-06-02
Payer: COMMERCIAL

## 2025-06-02 NOTE — PROGRESS NOTES
"Clinic Care Coordination Contact  Care Coordination Transition Communication    Clinical Data: Patient was hospitalized at [facility] from 5/24/25 to 5/28/25 with diagnosis of Word difficulty finding  Dehydration   .     Assessment: Patient has transitioned to TCU/ARU for short term rehabilitation:    Facility Name: [facility]   Transition Communication:  Notified facility of Primary Care- Care Coordination support via Epic fax.    Plan: Care Coordinator will await notification from facility staff informing of patient's discharge plans/needs. Care Coordinator will review chart and outreach to facility staff every 4 weeks and as needed.     Per hospital chart review: Julisa lives in a house alone. The house is a single family home and has a \"ramp to enter the house and then there are no steps she has to use inside the house either.\"     Daughter Alissa states, \"I only live a few blocks away and I physically check on my mom 3 times a day and other family check on her also. She has had home care in the past, but when PT helps her she won't do the exercises on her own so it isn't worth it. We don't want home care again. Also she does fine at the TCU and can leave the TCU walking, but when she gets home she is lazy and doesn't want to walk.  \"She always uses her wheelchair at home and outside the home. She is able to propel it on her own with her legs and arms. She pivots to the wheelchair. She can walk with the walker, but it is her choice not to and only uses the wheelchair\".     "

## 2025-06-02 NOTE — LETTER
Hand-off  for Care Coordination    Att: Discharge Planner:  Please if able, fax or call the number listed below when the below patient is discharged from your facility.  Clinic Care Coordination from patient's Primary Care Clinic will outreach to patient upon discharge to assist with TCU transition/discharge.    Thank you    Patient Name:   Julisa Chaney  Patient :     1945  Patient PCP:     Mara Murillo MD    Patient Primary Clinic:   [address] [place] [id number]  D/C Facility: _____________________________________________   TCU Contact Info for questions: ___________________________  D/C Date:  ______________________________________________  Follow-up Apt with PCP after TCU D/C:   ____________________  Other Follow-up Apt s:      _________________________________________  Additional information (concerns, and Home Care, ect ):   __________________________________________________________________  __________________________________________________________________  Care Coordinator to Contact at [place]  Fax to: [phone]  Attn:  Aleshia Moraes RN Clinic Care Coordination [facility]  Phone: [phone]            Electronically signed

## 2025-06-04 ENCOUNTER — TRANSITIONAL CARE UNIT VISIT (OUTPATIENT)
Dept: GERIATRICS | Facility: CLINIC | Age: 80
End: 2025-06-04
Payer: COMMERCIAL

## 2025-06-04 VITALS
TEMPERATURE: 98.1 F | HEART RATE: 68 BPM | SYSTOLIC BLOOD PRESSURE: 116 MMHG | OXYGEN SATURATION: 93 % | DIASTOLIC BLOOD PRESSURE: 51 MMHG | HEIGHT: 66 IN | RESPIRATION RATE: 18 BRPM | BODY MASS INDEX: 21.62 KG/M2 | WEIGHT: 134.5 LBS

## 2025-06-04 DIAGNOSIS — K59.01 SLOW TRANSIT CONSTIPATION: ICD-10-CM

## 2025-06-04 DIAGNOSIS — D64.9 ANEMIA, UNSPECIFIED TYPE: ICD-10-CM

## 2025-06-04 DIAGNOSIS — G50.0 TRIGEMINAL NEURALGIA: ICD-10-CM

## 2025-06-04 DIAGNOSIS — G89.4 CHRONIC PAIN SYNDROME: ICD-10-CM

## 2025-06-04 DIAGNOSIS — R53.81 PHYSICAL DECONDITIONING: Primary | ICD-10-CM

## 2025-06-04 PROCEDURE — 99309 SBSQ NF CARE MODERATE MDM 30: CPT | Performed by: NURSE PRACTITIONER

## 2025-06-04 RX ORDER — AMOXICILLIN 250 MG
2 CAPSULE ORAL 2 TIMES DAILY
Status: SHIPPED
Start: 2025-06-04

## 2025-06-04 NOTE — LETTER
" 6/4/2025      Julisa Chaney  [address]  [place] [address]        [facility] GERIATRICS    Chief Complaint   Patient presents with     RECHECK     HPI:  Julisa Chaney is a 80 year old  (1945), who is being seen today for an episodic care visit at: [facility] ([facility]) [[id number]]. Patient admitted to the above facility from  [facility]. Hospital stay 5/24/25 through 5/28/25. Patient with PMH trigeminal neuralgia, seizure disorder, and HLD presented with word-finding difficulty. It was determined that she had a flare of her trigeminal neuralgia that led to poor oral intake. She was admitted with dehydration and encephalopathy. Stroke workup negative. Neurology felt the word-finding difficulty could be TIA vs encephalopathy. They recommended ASA. She was recently started on baclofen for her neuralgia, this was stopped.    Today's concern is:   Patient reports that she is making progress with therapy. Her pain is improved with the increase in Tyelnol #3. She has not noted any confusion or dizziness. Her bowels have moved, but she does still feel constipated. She agrees to an increase in senna. Her only other concern today is difficulty chewing some food here because she has no teeth. Message sent to dietician    Allergies, and PMH/PSH reviewed in [employer] today.  REVIEW OF SYSTEMS:  4 point ROS including Respiratory, CV, GI and , other than that noted in the HPI,  is negative    Objective:   /51   Pulse 68   Temp 98.1  F (36.7  C)   Resp 18   Ht 1.676 m (5' 5.99\")   Wt 61 kg (134 lb 8 oz)   SpO2 93%   BMI 21.72 kg/m    GENERAL APPEARANCE:  Alert, in no distress  EYES:  EOM normal, conjunctiva and lids normal  RESP:  no respiratory distress  CV:  no edema  ABDOMEN:  soft, non-tender  PSYCH:  oriented X 3, affect and mood normal    Recent labs in [employer] reviewed by me today.       Assessment/Plan:  (R50.28) Physical deconditioning  (primary encounter " diagnosis)  Comment: Improving  Plan: PT/OT eval and treat, discharge planning per their recommendations.    (K59.01) Slow transit constipation  Comment: Due to medications, decreased activity. No evidence of bowel obstruction. Metamucil is likely not adding any benefit  Plan: Increase Senna-S to 2 tabs BID. Discontinue metamucil. Monitor bowel function. Adjust medication as clinically indicated.    (D64.9) Anemia, unspecified type  Comment: Hemoglobin for the past year has been > 11. Iron could be contributing to constipation. Will discontinue this  Plan: Discontinue ferrous sulfate. Recheck Hgb in 3-6 months    (G50.0) Trigeminal neuralgia  (G89.4) Chronic pain syndrome  Comment: Chronic condition being managed with medications.  Plan: Continue current POC with no changes at this time and adjustments as needed.      Orders:  Discontinue ferrous sulfate  Increase senna to 2 tabs BID  Discontinue metamucil    Electronically signed by: DANIELLE Villanueva CNP           Sincerely,        DANIELLE Villanueva CNP    Electronically signed

## 2025-06-04 NOTE — PROGRESS NOTES
"[facility] GERIATRICS    Chief Complaint   Patient presents with    RECHECK     HPI:  Julisa Chaney is a 80 year old  (1945), who is being seen today for an episodic care visit at: [facility] ([facility]) [[id number]]. Patient admitted to the above facility from  [facility]. Hospital stay 5/24/25 through 5/28/25. Patient with PMH trigeminal neuralgia, seizure disorder, and HLD presented with word-finding difficulty. It was determined that she had a flare of her trigeminal neuralgia that led to poor oral intake. She was admitted with dehydration and encephalopathy. Stroke workup negative. Neurology felt the word-finding difficulty could be TIA vs encephalopathy. They recommended ASA. She was recently started on baclofen for her neuralgia, this was stopped.    Today's concern is:   Patient reports that she is making progress with therapy. Her pain is improved with the increase in Tyelnol #3. She has not noted any confusion or dizziness. Her bowels have moved, but she does still feel constipated. She agrees to an increase in senna. Her only other concern today is difficulty chewing some food here because she has no teeth. Message sent to dietician    Allergies, and PMH/PSH reviewed in [employer] today.  REVIEW OF SYSTEMS:  4 point ROS including Respiratory, CV, GI and , other than that noted in the HPI,  is negative    Objective:   /51   Pulse 68   Temp 98.1  F (36.7  C)   Resp 18   Ht 1.676 m (5' 5.99\")   Wt 61 kg (134 lb 8 oz)   SpO2 93%   BMI 21.72 kg/m    GENERAL APPEARANCE:  Alert, in no distress  EYES:  EOM normal, conjunctiva and lids normal  RESP:  no respiratory distress  CV:  no edema  ABDOMEN:  soft, non-tender  PSYCH:  oriented X 3, affect and mood normal    Recent labs in [employer] reviewed by me today.       Assessment/Plan:  (R53.81) Physical deconditioning  (primary encounter diagnosis)  Comment: Improving  Plan: PT/OT eval and treat, discharge planning per " their recommendations.    (K59.01) Slow transit constipation  Comment: Due to medications, decreased activity. No evidence of bowel obstruction. Metamucil is likely not adding any benefit  Plan: Increase Senna-S to 2 tabs BID. Discontinue metamucil. Monitor bowel function. Adjust medication as clinically indicated.    (D64.9) Anemia, unspecified type  Comment: Hemoglobin for the past year has been > 11. Iron could be contributing to constipation. Will discontinue this  Plan: Discontinue ferrous sulfate. Recheck Hgb in 3-6 months    (G50.0) Trigeminal neuralgia  (G89.4) Chronic pain syndrome  Comment: Chronic condition being managed with medications.  Plan: Continue current POC with no changes at this time and adjustments as needed.      Orders:  Discontinue ferrous sulfate  Increase senna to 2 tabs BID  Discontinue metamucil    Electronically signed by: DANIELLE Villanueva CNP

## 2025-06-11 ENCOUNTER — TRANSITIONAL CARE UNIT VISIT (OUTPATIENT)
Dept: GERIATRICS | Facility: CLINIC | Age: 80
End: 2025-06-11
Payer: COMMERCIAL

## 2025-06-11 VITALS
DIASTOLIC BLOOD PRESSURE: 51 MMHG | RESPIRATION RATE: 16 BRPM | HEART RATE: 58 BPM | OXYGEN SATURATION: 95 % | SYSTOLIC BLOOD PRESSURE: 120 MMHG | WEIGHT: 135.6 LBS | HEIGHT: 66 IN | BODY MASS INDEX: 21.79 KG/M2 | TEMPERATURE: 97.7 F

## 2025-06-11 DIAGNOSIS — G50.0 TRIGEMINAL NEURALGIA: ICD-10-CM

## 2025-06-11 DIAGNOSIS — D64.9 ANEMIA, UNSPECIFIED TYPE: ICD-10-CM

## 2025-06-11 DIAGNOSIS — K59.01 SLOW TRANSIT CONSTIPATION: ICD-10-CM

## 2025-06-11 DIAGNOSIS — R53.81 PHYSICAL DECONDITIONING: Primary | ICD-10-CM

## 2025-06-11 DIAGNOSIS — G89.4 CHRONIC PAIN SYNDROME: ICD-10-CM

## 2025-06-11 NOTE — PROGRESS NOTES
"[facility] GERIATRICS    Chief Complaint   Patient presents with    RECHECK     HPI:  Julisa Chaney is a 80 year old  (1945), who is being seen today for an episodic care visit at: [facility] ([facility]) [[id number]]. Today's concern is: ***    Allergies, and PMH/PSH reviewed in [facility] today.  REVIEW OF SYSTEMS:  {sywwap84:476510}    Objective:   /51   Pulse 58   Temp 97.7  F (36.5  C)   Resp 16   Ht 1.676 m (5' 5.99\")   Wt 61.5 kg (135 lb 9.6 oz)   SpO2 95%   BMI 21.89 kg/m    {Nursing home physical exam :594339}    {fgslab:328709}    Assessment/Plan:  {S DX2:248288}    MED REC REQUIRED{TIP  Click the link below to document or use med rec list, use list to pull in response :998001}  Post Medication Reconciliation Status: {MED REC LIST:134960}      Orders:  {fgsorders:622145}  ***    Electronically signed by: Flavia Dumont ***      "

## 2025-06-18 ENCOUNTER — TRANSITIONAL CARE UNIT VISIT (OUTPATIENT)
Dept: GERIATRICS | Facility: CLINIC | Age: 80
End: 2025-06-18
Payer: COMMERCIAL

## 2025-06-18 VITALS
HEART RATE: 57 BPM | TEMPERATURE: 98.2 F | DIASTOLIC BLOOD PRESSURE: 43 MMHG | HEIGHT: 66 IN | BODY MASS INDEX: 21.69 KG/M2 | WEIGHT: 135 LBS | OXYGEN SATURATION: 90 % | RESPIRATION RATE: 16 BRPM | SYSTOLIC BLOOD PRESSURE: 105 MMHG

## 2025-06-18 DIAGNOSIS — R53.81 PHYSICAL DECONDITIONING: Primary | ICD-10-CM

## 2025-06-18 PROCEDURE — 99308 SBSQ NF CARE LOW MDM 20: CPT | Performed by: NURSE PRACTITIONER

## 2025-06-18 NOTE — PROGRESS NOTES
"[facility] GERIATRICS    Chief Complaint   Patient presents with    RECHECK     HPI:  Julisa Chaney is a 80 year old  (1945), who is being seen today for an episodic care visit at: [facility] ([facility]) [[id number]]. Patient admitted to the above facility from  [facility]. Hospital stay 5/24/25 through 5/28/25. Patient with PMH trigeminal neuralgia, seizure disorder, and HLD presented with word-finding difficulty. It was determined that she had a flare of her trigeminal neuralgia that led to poor oral intake. She was admitted with dehydration and encephalopathy. Stroke workup negative. Neurology felt the word-finding difficulty could be TIA vs encephalopathy. They recommended ASA. She was recently started on baclofen for her neuralgia, this was stopped.    Today's concern is:   Patient continues to work with PT/OT. Her insurance review is today. She walked 180 feet with PT today, but took 5 rest breaks. She does not have any new concerns today    Allergies, and PMH/PSH reviewed in [employer] today.  REVIEW OF SYSTEMS:  4 point ROS including Respiratory, CV, GI and , other than that noted in the HPI,  is negative    Objective:   /43   Pulse 57   Temp 98.2  F (36.8  C)   Resp 16   Ht 1.676 m (5' 5.99\")   Wt 61.2 kg (135 lb)   SpO2 (!) 90%   BMI 21.80 kg/m    GENERAL APPEARANCE:  Alert, in no distress  RESP:  no respiratory distress  PSYCH:  oriented X 3      Assessment/Plan:  (R53.81) Physical deconditioning  (primary encounter diagnosis)  Comment: Improving  Plan: PT/OT eval and treat, discharge planning per their recommendations/insurance approval      Electronically signed by: DANIELLE Villanueva CNP       "

## 2025-06-18 NOTE — LETTER
" 6/18/2025      Julisa Chaney  [address]  [place] [address]        [facility] GERIATRICS    Chief Complaint   Patient presents with     RECHECK     HPI:  Julisa Chaney is a 80 year old  (1945), who is being seen today for an episodic care visit at: [facility] ([facility]) [[id number]]. Patient admitted to the above facility from  [facility]. Hospital stay 5/24/25 through 5/28/25. Patient with PMH trigeminal neuralgia, seizure disorder, and HLD presented with word-finding difficulty. It was determined that she had a flare of her trigeminal neuralgia that led to poor oral intake. She was admitted with dehydration and encephalopathy. Stroke workup negative. Neurology felt the word-finding difficulty could be TIA vs encephalopathy. They recommended ASA. She was recently started on baclofen for her neuralgia, this was stopped.    Today's concern is:   Patient continues to work with PT/OT. Her insurance review is today. She walked 180 feet with PT today, but took 5 rest breaks. She does not have any new concerns today    Allergies, and PMH/PSH reviewed in [employer] today.  REVIEW OF SYSTEMS:  4 point ROS including Respiratory, CV, GI and , other than that noted in the HPI,  is negative    Objective:   /43   Pulse 57   Temp 98.2  F (36.8  C)   Resp 16   Ht 1.676 m (5' 5.99\")   Wt 61.2 kg (135 lb)   SpO2 (!) 90%   BMI 21.80 kg/m    GENERAL APPEARANCE:  Alert, in no distress  RESP:  no respiratory distress  PSYCH:  oriented X 3      Assessment/Plan:  (R53.81) Physical deconditioning  (primary encounter diagnosis)  Comment: Improving  Plan: PT/OT eval and treat, discharge planning per their recommendations/insurance approval      Electronically signed by: DANIELLE Villanueva CNP           Sincerely,        DANIELLE Villanueva CNP    Electronically signed  "

## 2025-06-23 ENCOUNTER — DISCHARGE SUMMARY NURSING HOME (OUTPATIENT)
Dept: GERIATRICS | Facility: CLINIC | Age: 80
End: 2025-06-23
Payer: COMMERCIAL

## 2025-06-23 VITALS
BODY MASS INDEX: 21.69 KG/M2 | OXYGEN SATURATION: 90 % | HEIGHT: 66 IN | HEART RATE: 57 BPM | TEMPERATURE: 98.2 F | WEIGHT: 135 LBS | SYSTOLIC BLOOD PRESSURE: 105 MMHG | DIASTOLIC BLOOD PRESSURE: 43 MMHG | RESPIRATION RATE: 16 BRPM

## 2025-06-23 DIAGNOSIS — D64.9 ANEMIA, UNSPECIFIED TYPE: ICD-10-CM

## 2025-06-23 DIAGNOSIS — G50.0 TRIGEMINAL NEURALGIA: ICD-10-CM

## 2025-06-23 DIAGNOSIS — K59.01 SLOW TRANSIT CONSTIPATION: ICD-10-CM

## 2025-06-23 DIAGNOSIS — F32.9 MAJOR DEPRESSIVE DISORDER, REMISSION STATUS UNSPECIFIED, UNSPECIFIED WHETHER RECURRENT: ICD-10-CM

## 2025-06-23 DIAGNOSIS — G89.4 CHRONIC PAIN SYNDROME: ICD-10-CM

## 2025-06-23 DIAGNOSIS — R53.81 PHYSICAL DECONDITIONING: Primary | ICD-10-CM

## 2025-06-23 DIAGNOSIS — F11.20 CONTINUOUS OPIOID DEPENDENCE (H): ICD-10-CM

## 2025-06-23 RX ORDER — ACETAMINOPHEN AND CODEINE PHOSPHATE 300; 30 MG/1; MG/1
1 TABLET ORAL EVERY 6 HOURS PRN
Qty: 60 TABLET | Refills: 0 | Status: SHIPPED | OUTPATIENT
Start: 2025-06-23

## 2025-06-23 NOTE — LETTER
6/23/2025      Julisa Chaney  [address]  [place] [address]        [facility] GERIATRICS DISCHARGE SUMMARY  PATIENT'S NAME: Julisa Chaney  YOB: 1945  MEDICAL RECORD NUMBER:  [id number]  Place of Service where encounter took place:  [facility] ([facility]) [[id number]]    PRIMARY CARE PROVIDER AND CLINIC RESPONSIBLE AFTER TRANSFER:   Mara Murillo MD, [address] [facility] / [place] [address]    [facility] Provider     Transferring providers: DANIELLE Villanueva CNP, Clementine Berumen MD  Recent Hospitalization/ED:  Hospital  [facility] stay 5/24/25 to 5/28/25.  Date of SNF Admission: May 28, 2025  Date of SNF (anticipated) Discharge: June 26, 2025  Discharged to: previous independent home  DME: No new DME needed    CODE STATUS/ADVANCE DIRECTIVES DISCUSSION:  Full Code   ALLERGIES: Contrast [iohexol], Chocolate, Contrast dye, Diatrizoate, and Penicillins    NURSING FACILITY COURSE   Medication Changes/Rationale:   Ferrous sulfate stopped, no longer needed  Senna added due to constipation, then changed to prn    Summary of nursing facility stay:   Julisa Chaney  is a 80 year old  (1945), admitted to the above facility from  [facility]. Hospital stay 5/24/25 through 5/28/25. Patient with PMH trigeminal neuralgia, seizure disorder, and HLD presented with word-finding difficulty. It was determined that she had a flare of her trigeminal neuralgia that led to poor oral intake. She was admitted with dehydration and encephalopathy. Stroke workup negative. Neurology felt the word-finding difficulty could be TIA vs encephalopathy. They recommended ASA. She was recently started on baclofen for her neuralgia, this was stopped.    Physical deconditioning  Improving. Patient reports that she feels ready to discharge home this week. She is allowed to use the restroom on her own now. Plan is to continue with home PT/OT    Trigeminal  neuralgia  Pain has been controlled with Tylenol #3 and gabapentin. She is tolerating these without side effects.     Slow transit constipation  Initially started on senna-s for constipations, then changed to prn due to loose stools. She has taken this intermittently    Anemia, unspecified type  Iron was stopped after noting that her Hgb has been >11 for the past year. Recommend rechecking Hgb in 3 months    Major depressive disorder, remission status unspecified, unspecified whether recurrent  Patient is known to this provider from previous visits. She has now been thriving and has gained about 30 pounds in a few months. It may be worth considering tapering her off Remeron if she feels her mood is stable. Did not make any changes at TCU      Discharge Medications:  Current Outpatient Medications   Medication Sig Dispense Refill     acetaminophen-codeine (TYLENOL #3) 300-30 MG per tablet Take 1 tablet by mouth every 6 hours as needed for severe pain. 60 tablet 0     aspirin (ASA) 81 MG EC tablet Take 1 tablet (81 mg) by mouth daily. 60 tablet 0     atorvastatin (LIPITOR) 40 MG tablet Take 1 tablet (40 mg) by mouth every evening. 60 tablet 0     gabapentin (NEURONTIN) 100 MG capsule Take 1 capsule (100 mg) by mouth 2 times daily. Take 1 capsule in the morning and 1 capsule at night 15 capsule 1     gabapentin (NEURONTIN) 300 MG capsule Take 1 capsule (300 mg) by mouth at bedtime. 90 capsule 2     levETIRAcetam (KEPPRA) 750 MG tablet Take 1 tablet (750 mg) by mouth 2 times daily.       loperamide (IMODIUM) 2 MG capsule Take 1 capsule (2 mg) by mouth 4 times daily as needed for diarrhea       menthol-zinc oxide (CALMOSEPTINE) 0.44-20.6 % OINT ointment Apply topically 4 times daily as needed for skin protection (for sores on buttox from sitting)       mirtazapine (REMERON) 15 MG tablet TAKE 1 TABLET(15 MG) BY MOUTH AT BEDTIME 30 tablet 0     Multiple Vitamin (MULTIVITAMIN) TABS Take 1 tablet by mouth daily. 100 tablet 3  "    nortriptyline (PAMELOR) 50 MG capsule Take 1 capsule (50 mg) by mouth at bedtime. 90 capsule 3     OXcarbazepine (TRILEPTAL) 150 MG tablet TAKE 3 TABLETS(450 MG) BY MOUTH DAILY 270 tablet 1     potassium chloride elin ER (KLOR-CON M20) 20 MEQ CR tablet Take 1 tablet (20 mEq) by mouth daily. 90 tablet 1     QUEtiapine (SEROQUEL) 50 MG tablet TAKE 1 TABLET(50 MG) BY MOUTH AT BEDTIME 30 tablet 0     senna-docusate (SENOKOT-S/PERICOLACE) 8.6-50 MG tablet Take 2 tablets by mouth 2 times daily as needed for constipation.       topiramate (TOPAMAX) 50 MG tablet TAKE 1 TABLET(50 MG) BY MOUTH AT BEDTIME 90 tablet 3     Vitamin D3 50 mcg (2000 units) tablet Take 1 tablet (50 mcg) by mouth daily. 90 tablet 1        Controlled medications:   OK to send remaining Tylenol #3     Past Medical History:   Past Medical History:   Diagnosis Date     Aspiration pneumonia (H) 02/2022     Collagenous colitis 01/30/2024     Depression      High cholesterol      Migraines      Pyloric ulcer, chronic      Sepsis (H) 08/10/2020     Trigeminal neuralgia      Physical Exam:   Vitals: /43   Pulse 57   Temp 98.2  F (36.8  C)   Resp 16   Ht 1.676 m (5' 5.99\")   Wt 61.2 kg (135 lb)   SpO2 (!) 90%   BMI 21.80 kg/m    BMI: Body mass index is 21.8 kg/m .  GENERAL APPEARANCE:  Alert, in no distress  ENT:  Mouth and posterior oropharynx normal, moist mucous membranes, [place]  EYES:  EOM normal, conjunctiva and lids normal  RESP:  no respiratory distress  CV:  no edema  PSYCH:  oriented X 3, affect and mood normal     SNF labs: Labs done in SNF are in [facility] EPIC. Please refer to them using [facility]/Care Everywhere.    DISCHARGE PLAN:  Follow up labs: No labs orders/due  Medical Follow Up:      Follow up with primary care provider in 2-3 weeks  Current [place] [facility] scheduled appointments:  Appointments in Next Year      Aug 21, 2025 2:15 PM  (Arrive by 1:55 PM)  Provider Visit with Mara Murillo MD  [facility] (" [facility] [phone]           Discharge Services: Home Care:  Occupational Therapy, Physical Therapy, and Registered Nurse      TOTAL DISCHARGE TIME:   Greater than 30 minutes  Electronically signed by:  DANIELLE Villanueva CNP       Documentation of Face to Face and Certification for Home Health Services    I certify that services are/were furnished while this patient was under the care of a physician and that a physician or an allowed non-physician practitioner (NPP), had a face-to-face encounter that meets the physician face-to-face encounter requirements. The encounter was in whole, or in part, related to the primary reason for home health. The patient is confined to his/her home and needs intermittent skilled nursing, physical therapy, speech-language pathology, or the continued need for occupational therapy. A plan of care has been established by a physician and is periodically reviewed by a physician.  Date of Face-to-Face Encounter: 6/23/2025.    I certify that, based on my findings, the following services are medically necessary home health services: Nursing, Occupational Therapy, and Physical Therapy.    My clinical findings support the need for the above skilled services because: Requires assistance of another person or specialized equipment to access medical services because patient: Is unable to walk greater than 100 feet without rest...    Patient to re-establish plan of care with their PCP within 7-10 days after leaving the facility to reestablish care.  Medicare certified PECOS provider: DANIELLE Villanueva CNP  Date: June 23, 2025                  Sincerely,        DANIELLE Villanueva CNP    Electronically signed

## 2025-06-23 NOTE — PROGRESS NOTES
[facility] GERIATRICS DISCHARGE SUMMARY  PATIENT'S NAME: Julisa Chaney  YOB: 1945  MEDICAL RECORD NUMBER:  [id number]  Place of Service where encounter took place:  [facility] ([facility]) [[id number]]    PRIMARY CARE PROVIDER AND CLINIC RESPONSIBLE AFTER TRANSFER:   Mara Murillo MD, [address] [facility] / [facility] [address]    [facility] Provider     Transferring providers: DANIELLE Villanueva CNP, Clementine Berumen MD  Recent Hospitalization/ED:  Hospital  [facility] stay 5/24/25 to 5/28/25.  Date of SNF Admission: May 28, 2025  Date of SNF (anticipated) Discharge: June 26, 2025  Discharged to: previous independent home  DME: No new DME needed    CODE STATUS/ADVANCE DIRECTIVES DISCUSSION:  Full Code   ALLERGIES: Contrast [iohexol], Chocolate, Contrast dye, Diatrizoate, and Penicillins    NURSING FACILITY COURSE   Medication Changes/Rationale:   Ferrous sulfate stopped, no longer needed  Senna added due to constipation, then changed to prn    Summary of nursing facility stay:   Julisa Chaney  is a 80 year old  (1945), admitted to the above facility from  [facility]. Hospital stay 5/24/25 through 5/28/25. Patient with PMH trigeminal neuralgia, seizure disorder, and HLD presented with word-finding difficulty. It was determined that she had a flare of her trigeminal neuralgia that led to poor oral intake. She was admitted with dehydration and encephalopathy. Stroke workup negative. Neurology felt the word-finding difficulty could be TIA vs encephalopathy. They recommended ASA. She was recently started on baclofen for her neuralgia, this was stopped.    Physical deconditioning  Improving. Patient reports that she feels ready to discharge home this week. She is allowed to use the restroom on her own now. Plan is to continue with home PT/OT    Trigeminal neuralgia  Pain has been controlled with Tylenol #3 and gabapentin. She is tolerating these  without side effects.     Slow transit constipation  Initially started on senna-s for constipations, then changed to prn due to loose stools. She has taken this intermittently    Anemia, unspecified type  Iron was stopped after noting that her Hgb has been >11 for the past year. Recommend rechecking Hgb in 3 months    Major depressive disorder, remission status unspecified, unspecified whether recurrent  Patient is known to this provider from previous visits. She has now been thriving and has gained about 30 pounds in a few months. It may be worth considering tapering her off Remeron if she feels her mood is stable. Did not make any changes at TCU      Discharge Medications:  Current Outpatient Medications   Medication Sig Dispense Refill    acetaminophen-codeine (TYLENOL #3) 300-30 MG per tablet Take 1 tablet by mouth every 6 hours as needed for severe pain. 60 tablet 0    aspirin (ASA) 81 MG EC tablet Take 1 tablet (81 mg) by mouth daily. 60 tablet 0    atorvastatin (LIPITOR) 40 MG tablet Take 1 tablet (40 mg) by mouth every evening. 60 tablet 0    gabapentin (NEURONTIN) 100 MG capsule Take 1 capsule (100 mg) by mouth 2 times daily. Take 1 capsule in the morning and 1 capsule at night 15 capsule 1    gabapentin (NEURONTIN) 300 MG capsule Take 1 capsule (300 mg) by mouth at bedtime. 90 capsule 2    levETIRAcetam (KEPPRA) 750 MG tablet Take 1 tablet (750 mg) by mouth 2 times daily.      loperamide (IMODIUM) 2 MG capsule Take 1 capsule (2 mg) by mouth 4 times daily as needed for diarrhea      menthol-zinc oxide (CALMOSEPTINE) 0.44-20.6 % OINT ointment Apply topically 4 times daily as needed for skin protection (for sores on buttox from sitting)      mirtazapine (REMERON) 15 MG tablet TAKE 1 TABLET(15 MG) BY MOUTH AT BEDTIME 30 tablet 0    Multiple Vitamin (MULTIVITAMIN) TABS Take 1 tablet by mouth daily. 100 tablet 3    nortriptyline (PAMELOR) 50 MG capsule Take 1 capsule (50 mg) by mouth at bedtime. 90 capsule 3     "OXcarbazepine (TRILEPTAL) 150 MG tablet TAKE 3 TABLETS(450 MG) BY MOUTH DAILY 270 tablet 1    potassium chloride elin ER (KLOR-CON M20) 20 MEQ CR tablet Take 1 tablet (20 mEq) by mouth daily. 90 tablet 1    QUEtiapine (SEROQUEL) 50 MG tablet TAKE 1 TABLET(50 MG) BY MOUTH AT BEDTIME 30 tablet 0    senna-docusate (SENOKOT-S/PERICOLACE) 8.6-50 MG tablet Take 2 tablets by mouth 2 times daily as needed for constipation.      topiramate (TOPAMAX) 50 MG tablet TAKE 1 TABLET(50 MG) BY MOUTH AT BEDTIME 90 tablet 3    Vitamin D3 50 mcg (2000 units) tablet Take 1 tablet (50 mcg) by mouth daily. 90 tablet 1        Controlled medications:   OK to send remaining Tylenol #3     Past Medical History:   Past Medical History:   Diagnosis Date    Aspiration pneumonia (H) 02/2022    Collagenous colitis 01/30/2024    Depression     High cholesterol     Migraines     Pyloric ulcer, chronic     Sepsis (H) 08/10/2020    Trigeminal neuralgia      Physical Exam:   Vitals: /43   Pulse 57   Temp 98.2  F (36.8  C)   Resp 16   Ht 1.676 m (5' 5.99\")   Wt 61.2 kg (135 lb)   SpO2 (!) 90%   BMI 21.80 kg/m    BMI: Body mass index is 21.8 kg/m .  GENERAL APPEARANCE:  Alert, in no distress  ENT:  Mouth and posterior oropharynx normal, moist mucous membranes, [place]  EYES:  EOM normal, conjunctiva and lids normal  RESP:  no respiratory distress  CV:  no edema  PSYCH:  oriented X 3, affect and mood normal     SNF labs: Labs done in SNF are in [place] EPIC. Please refer to them using EPIC/Care Everywhere.    DISCHARGE PLAN:  Follow up labs: No labs orders/due  Medical Follow Up:      Follow up with primary care provider in 2-3 weeks  Current [place] scheduled appointments:  Appointments in Next Year      Aug 21, 2025 2:15 PM  (Arrive by 1:55 PM)  Provider Visit with Mara Murillo MD  [facility] ([facility]) [phone]           Discharge Services: Home Care:  Occupational Therapy, " Physical Therapy, and Registered Nurse      TOTAL DISCHARGE TIME:   Greater than 30 minutes  Electronically signed by:  DANIELLE Villanueva CNP       Documentation of Face to Face and Certification for Home Health Services    I certify that services are/were furnished while this patient was under the care of a physician and that a physician or an allowed non-physician practitioner (NPP), had a face-to-face encounter that meets the physician face-to-face encounter requirements. The encounter was in whole, or in part, related to the primary reason for home health. The patient is confined to his/her home and needs intermittent skilled nursing, physical therapy, speech-language pathology, or the continued need for occupational therapy. A plan of care has been established by a physician and is periodically reviewed by a physician.  Date of Face-to-Face Encounter: 6/23/2025.    I certify that, based on my findings, the following services are medically necessary home health services: Nursing, Occupational Therapy, and Physical Therapy.    My clinical findings support the need for the above skilled services because: Requires assistance of another person or specialized equipment to access medical services because patient: Is unable to walk greater than 100 feet without rest...    Patient to re-establish plan of care with their PCP within 7-10 days after leaving the facility to reestablish care.  Medicare certified PECOS provider: DANIELLE Villanueva CNP  Date: June 23, 2025

## 2025-06-24 ENCOUNTER — MYC MEDICAL ADVICE (OUTPATIENT)
Dept: FAMILY MEDICINE | Facility: CLINIC | Age: 80
End: 2025-06-24
Payer: COMMERCIAL

## 2025-06-24 DIAGNOSIS — G50.0 TRIGEMINAL NEURALGIA: ICD-10-CM

## 2025-06-24 DIAGNOSIS — F32.0 CURRENT MILD EPISODE OF MAJOR DEPRESSIVE DISORDER WITHOUT PRIOR EPISODE: ICD-10-CM

## 2025-06-24 DIAGNOSIS — F41.9 ANXIETY: ICD-10-CM

## 2025-06-24 DIAGNOSIS — G89.4 CHRONIC PAIN SYNDROME: ICD-10-CM

## 2025-06-24 DIAGNOSIS — F11.20 CONTINUOUS OPIOID DEPENDENCE (H): ICD-10-CM

## 2025-06-24 RX ORDER — MIRTAZAPINE 15 MG/1
15 TABLET, FILM COATED ORAL AT BEDTIME
Qty: 30 TABLET | Refills: 0 | Status: SHIPPED | OUTPATIENT
Start: 2025-06-24

## 2025-06-24 RX ORDER — QUETIAPINE FUMARATE 50 MG/1
50 TABLET, FILM COATED ORAL AT BEDTIME
Qty: 30 TABLET | Refills: 0 | Status: SHIPPED | OUTPATIENT
Start: 2025-06-24

## 2025-06-26 NOTE — TELEPHONE ENCOUNTER
Family request for a full 1 month supply of Tylenol #3.     MyChart message sent for clarification of pharmacy.

## 2025-07-02 ENCOUNTER — TELEPHONE (OUTPATIENT)
Dept: FAMILY MEDICINE | Facility: CLINIC | Age: 80
End: 2025-07-02
Payer: COMMERCIAL

## 2025-07-02 NOTE — TELEPHONE ENCOUNTER
MTM referral from: Transitions of Care (recent hospital discharge, TCU discharge, or ED visit)    MTM referral outreach attempt #2 on July 2, 2025 at 11:34 AM      Outcome: Patient not reachable after several attempts, sent [facility] message    Use [facility] part d map for the carrier/Plan on the flowsheet      MyChart Message Sent    See Milo RIVERA   [phone]

## 2025-07-03 RX ORDER — ACETAMINOPHEN AND CODEINE PHOSPHATE 300; 30 MG/1; MG/1
1 TABLET ORAL EVERY 6 HOURS PRN
Qty: 120 TABLET | Refills: 0 | Status: SHIPPED | OUTPATIENT
Start: 2025-07-07

## 2025-07-21 DIAGNOSIS — F41.9 ANXIETY: ICD-10-CM

## 2025-07-21 RX ORDER — ACETAMINOPHEN 160 MG
1 TABLET,DISINTEGRATING ORAL DAILY
Qty: 30 CAPSULE | OUTPATIENT
Start: 2025-07-21

## 2025-07-21 RX ORDER — QUETIAPINE FUMARATE 50 MG/1
50 TABLET, FILM COATED ORAL AT BEDTIME
Qty: 30 TABLET | Refills: 0 | Status: SHIPPED | OUTPATIENT
Start: 2025-07-21

## 2025-07-22 ENCOUNTER — PATIENT OUTREACH (OUTPATIENT)
Dept: CARE COORDINATION | Facility: CLINIC | Age: 80
End: 2025-07-22
Payer: COMMERCIAL

## 2025-08-21 ASSESSMENT — PATIENT HEALTH QUESTIONNAIRE - PHQ9
10. IF YOU CHECKED OFF ANY PROBLEMS, HOW DIFFICULT HAVE THESE PROBLEMS MADE IT FOR YOU TO DO YOUR WORK, TAKE CARE OF THINGS AT HOME, OR GET ALONG WITH OTHER PEOPLE: NOT DIFFICULT AT ALL
SUM OF ALL RESPONSES TO PHQ QUESTIONS 1-9: 2
SUM OF ALL RESPONSES TO PHQ QUESTIONS 1-9: 2

## 2025-08-22 ENCOUNTER — ANCILLARY PROCEDURE (OUTPATIENT)
Dept: GENERAL RADIOLOGY | Facility: CLINIC | Age: 80
End: 2025-08-22
Attending: INTERNAL MEDICINE
Payer: COMMERCIAL

## 2025-08-22 ENCOUNTER — OFFICE VISIT (OUTPATIENT)
Dept: INTERNAL MEDICINE | Facility: CLINIC | Age: 80
End: 2025-08-22
Payer: COMMERCIAL

## 2025-08-22 VITALS
HEIGHT: 66 IN | TEMPERATURE: 97.9 F | WEIGHT: 120 LBS | BODY MASS INDEX: 19.29 KG/M2 | OXYGEN SATURATION: 93 % | RESPIRATION RATE: 16 BRPM | HEART RATE: 70 BPM | DIASTOLIC BLOOD PRESSURE: 62 MMHG | SYSTOLIC BLOOD PRESSURE: 120 MMHG

## 2025-08-22 DIAGNOSIS — R05.1 ACUTE COUGH: ICD-10-CM

## 2025-08-22 DIAGNOSIS — R05.1 ACUTE COUGH: Primary | ICD-10-CM

## 2025-08-22 PROCEDURE — 3074F SYST BP LT 130 MM HG: CPT | Performed by: INTERNAL MEDICINE

## 2025-08-22 PROCEDURE — 71046 X-RAY EXAM CHEST 2 VIEWS: CPT | Mod: TC | Performed by: RADIOLOGY

## 2025-08-22 PROCEDURE — 3078F DIAST BP <80 MM HG: CPT | Performed by: INTERNAL MEDICINE

## 2025-08-22 PROCEDURE — 87635 SARS-COV-2 COVID-19 AMP PRB: CPT | Performed by: INTERNAL MEDICINE

## 2025-08-22 PROCEDURE — 99213 OFFICE O/P EST LOW 20 MIN: CPT | Performed by: INTERNAL MEDICINE

## 2025-08-22 RX ORDER — SULFACETAMIDE SODIUM 100 MG/ML
1-2 SOLUTION/ DROPS OPHTHALMIC
Qty: 10 ML | Refills: 0 | Status: SHIPPED | OUTPATIENT
Start: 2025-08-22

## 2025-08-23 LAB — SARS-COV-2 RNA RESP QL NAA+PROBE: NEGATIVE

## 2025-08-25 DIAGNOSIS — F41.9 ANXIETY: ICD-10-CM

## 2025-08-25 DIAGNOSIS — F11.20 CONTINUOUS OPIOID DEPENDENCE (H): ICD-10-CM

## 2025-08-25 DIAGNOSIS — F32.0 CURRENT MILD EPISODE OF MAJOR DEPRESSIVE DISORDER WITHOUT PRIOR EPISODE: ICD-10-CM

## 2025-08-25 DIAGNOSIS — G50.0 TRIGEMINAL NEURALGIA: ICD-10-CM

## 2025-08-25 DIAGNOSIS — G89.4 CHRONIC PAIN SYNDROME: ICD-10-CM

## 2025-08-26 RX ORDER — MIRTAZAPINE 15 MG/1
15 TABLET, FILM COATED ORAL AT BEDTIME
Qty: 30 TABLET | Refills: 0 | Status: SHIPPED | OUTPATIENT
Start: 2025-08-26

## 2025-08-26 RX ORDER — ACETAMINOPHEN AND CODEINE PHOSPHATE 300; 30 MG/1; MG/1
1 TABLET ORAL EVERY 6 HOURS PRN
Qty: 120 TABLET | Refills: 3 | Status: SHIPPED | OUTPATIENT
Start: 2025-08-26

## 2025-08-26 RX ORDER — QUETIAPINE FUMARATE 50 MG/1
50 TABLET, FILM COATED ORAL AT BEDTIME
Qty: 30 TABLET | Refills: 0 | Status: SHIPPED | OUTPATIENT
Start: 2025-08-26

## 2025-09-01 ENCOUNTER — MYC REFILL (OUTPATIENT)
Dept: FAMILY MEDICINE | Facility: CLINIC | Age: 80
End: 2025-09-01
Payer: COMMERCIAL

## 2025-09-01 DIAGNOSIS — G40.909 SEIZURE DISORDER (H): ICD-10-CM

## 2025-09-02 RX ORDER — LEVETIRACETAM 750 MG/1
750 TABLET ORAL 2 TIMES DAILY
Qty: 180 TABLET | Refills: 3 | Status: SHIPPED | OUTPATIENT
Start: 2025-09-02

## (undated) DEVICE — SOL WATER IRRIG 1000ML BOTTLE 2F7114

## (undated) DEVICE — SUCTION MANIFOLD NEPTUNE 2 SYS 1 PORT 702-025-000

## (undated) DEVICE — MANIFOLD NEPTUNE 4 PORT 700-20

## (undated) DEVICE — ESU ELEC BLADE 2.75" COATED/INSULATED E1455

## (undated) DEVICE — LINEN TOWEL PACK X5 5464

## (undated) DEVICE — SPONGE COTTONOID 1/2X3" 80-1407

## (undated) DEVICE — SOL NACL 0.9% IRRIG 1000ML BOTTLE 2F7124

## (undated) DEVICE — DRAPE POUCH INSTRUMENT 1018

## (undated) DEVICE — DRAPE CRANIOTOMY W/POUCH 9450

## (undated) DEVICE — ESU GROUND PAD UNIVERSAL W/O CORD

## (undated) DEVICE — PREP DURAPREP 26ML APL 8630

## (undated) DEVICE — DRAIN SYSTEM CSF EXTERNAL DRAINAGE VENTRIC/LUMBAR 46914

## (undated) DEVICE — DRSG KERLIX FLUFFS X5

## (undated) DEVICE — SPONGE SURGIFOAM 100 1974

## (undated) DEVICE — ENDO FORCEP SPIKED SERRATED SHAFT JUMBO 239CM G56998

## (undated) DEVICE — STPL SKIN 35W 6.9MM  PXW35

## (undated) DEVICE — PACK NEURO SNE15SNFSA

## (undated) DEVICE — BLADE CLIPPER 4412A

## (undated) DEVICE — TUBING SUCTION MEDI-VAC 1/4"X20' N620A

## (undated) RX ORDER — LIDOCAINE HYDROCHLORIDE 20 MG/ML
JELLY TOPICAL
Status: DISPENSED
Start: 2022-07-30

## (undated) RX ORDER — LIDOCAINE HYDROCHLORIDE 10 MG/ML
INJECTION, SOLUTION EPIDURAL; INFILTRATION; INTRACAUDAL; PERINEURAL
Status: DISPENSED
Start: 2022-08-18

## (undated) RX ORDER — ONDANSETRON 2 MG/ML
INJECTION INTRAMUSCULAR; INTRAVENOUS
Status: DISPENSED
Start: 2022-07-31

## (undated) RX ORDER — DEXAMETHASONE SODIUM PHOSPHATE 4 MG/ML
INJECTION, SOLUTION INTRA-ARTICULAR; INTRALESIONAL; INTRAMUSCULAR; INTRAVENOUS; SOFT TISSUE
Status: DISPENSED
Start: 2022-07-31

## (undated) RX ORDER — VANCOMYCIN HYDROCHLORIDE 1 G/200ML
INJECTION, SOLUTION INTRAVENOUS
Status: DISPENSED
Start: 2022-07-31

## (undated) RX ORDER — LIDOCAINE HYDROCHLORIDE 20 MG/ML
JELLY TOPICAL
Status: DISPENSED
Start: 2022-08-16

## (undated) RX ORDER — PROPOFOL 10 MG/ML
INJECTION, EMULSION INTRAVENOUS
Status: DISPENSED
Start: 2022-07-31

## (undated) RX ORDER — FENTANYL CITRATE 50 UG/ML
INJECTION, SOLUTION INTRAMUSCULAR; INTRAVENOUS
Status: DISPENSED
Start: 2022-07-31

## (undated) RX ORDER — LIDOCAINE HYDROCHLORIDE 20 MG/ML
INJECTION, SOLUTION EPIDURAL; INFILTRATION; INTRACAUDAL; PERINEURAL
Status: DISPENSED
Start: 2022-07-31

## (undated) RX ORDER — GLYCOPYRROLATE 0.2 MG/ML
INJECTION, SOLUTION INTRAMUSCULAR; INTRAVENOUS
Status: DISPENSED
Start: 2022-07-31

## (undated) RX ORDER — FENTANYL CITRATE 50 UG/ML
INJECTION, SOLUTION INTRAMUSCULAR; INTRAVENOUS
Status: DISPENSED
Start: 2022-08-16

## (undated) RX ORDER — LIDOCAINE HYDROCHLORIDE 10 MG/ML
INJECTION, SOLUTION INFILTRATION; PERINEURAL
Status: DISPENSED
Start: 2022-08-16